# Patient Record
Sex: FEMALE | Race: WHITE | NOT HISPANIC OR LATINO | Employment: UNEMPLOYED | URBAN - METROPOLITAN AREA
[De-identification: names, ages, dates, MRNs, and addresses within clinical notes are randomized per-mention and may not be internally consistent; named-entity substitution may affect disease eponyms.]

---

## 2018-07-23 ENCOUNTER — TELEPHONE (OUTPATIENT)
Dept: TRANSPLANT | Facility: CLINIC | Age: 68
End: 2018-07-23

## 2018-07-23 NOTE — TELEPHONE ENCOUNTER
..Patient: Scarlett Ramirez       MRN: [id number]      : 1950     Age: 68 y.o.  No address on file.    Provider: Celestine    Urgency of review: urgent    Patient Transplant Status: Other To determine if patient a candidate for evaluation    Reason for presentation: Reassessment    Clinical Summary: 67 y/o female with chronic hepatitis B, currently under entecavir treatment. HBV DNA not detected in 2018.  Recent decompensation with refractory ascites, hyponatremia, encephalopathy, esophageal varices and portal vein thrombosis. Anticoagulation (Clexane) Enoxaparin started.     Imaging to be reviewed: MRI abdomen    HCC Treatment History: N/A    ABO: A +    Platelets: 96  Creatinine: 0.81  Bilirubin:1.36  AFP Last 3 each if available: 3.6    MELD: 19    Plan:     Follow-up Provider:

## 2018-07-24 ENCOUNTER — CONFERENCE (OUTPATIENT)
Dept: TRANSPLANT | Facility: CLINIC | Age: 68
End: 2018-07-24

## 2018-07-24 NOTE — TELEPHONE ENCOUNTER
..Patient: Scarlett Ramirez       MRN: [id number]      : 1950     Age: 68 y.o.  No address on file.    Provider: Celestine    Urgency of review: urgent    Patient Transplant Status: Other To determine if patient a candidate for evaluation    Reason for presentation: Reassessment    Clinical Summary: 69 y/o female with chronic hepatitis B, currently under entecavir treatment. HBV DNA not detected in 2018.  Recent decompensation with refractory ascites, hyponatremia, encephalopathy, esophageal varices and portal vein thrombosis. Anticoagulation (Clexane) Enoxaparin started.     Imaging to be reviewed: MRI abdomen    HCC Treatment History: N/A    ABO: A +    Platelets: 96  Creatinine: 0.81  Bilirubin:1.36  AFP Last 3 each if available: 3.6    MELD: 19    Plan: Thrombus from MPV to bifurcation; but potentially still doable.  Ok to come for evaluation    JS Vidales RN notified.     Follow-up Provider: Gigi Sprague MD

## 2018-08-09 DIAGNOSIS — Z76.82 AWAITING ORGAN TRANSPLANT STATUS: Primary | ICD-10-CM

## 2018-08-09 DIAGNOSIS — R18.8 ASCITES OF LIVER: Primary | ICD-10-CM

## 2018-08-09 DIAGNOSIS — Z76.82 ORGAN TRANSPLANT CANDIDATE: ICD-10-CM

## 2018-08-09 DIAGNOSIS — Z76.82 AWAITING ORGAN TRANSPLANT STATUS: ICD-10-CM

## 2018-08-10 DIAGNOSIS — Z76.82 AWAITING ORGAN TRANSPLANT STATUS: Primary | ICD-10-CM

## 2018-08-13 DIAGNOSIS — Z76.82 ORGAN TRANSPLANT CANDIDATE: Primary | ICD-10-CM

## 2018-08-14 ENCOUNTER — HOSPITAL ENCOUNTER (INPATIENT)
Facility: HOSPITAL | Age: 68
LOS: 10 days | Discharge: HOME | DRG: 871 | End: 2018-08-24
Attending: EMERGENCY MEDICINE | Admitting: EMERGENCY MEDICINE
Payer: COMMERCIAL

## 2018-08-14 ENCOUNTER — HOSPITAL ENCOUNTER (OUTPATIENT)
Dept: CARDIOLOGY | Facility: CLINIC | Age: 68
Discharge: HOME | End: 2018-08-14
Attending: INTERNAL MEDICINE
Payer: COMMERCIAL

## 2018-08-14 DIAGNOSIS — Z01.818 PRE-TRANSPLANT EVALUATION FOR LIVER TRANSPLANT: ICD-10-CM

## 2018-08-14 DIAGNOSIS — Z76.82 ORGAN TRANSPLANT CANDIDATE: ICD-10-CM

## 2018-08-14 DIAGNOSIS — E43 SEVERE MALNUTRITION: ICD-10-CM

## 2018-08-14 DIAGNOSIS — K74.60 CHRONIC HEPATITIS B WITH DELTA AGENT WITH CIRRHOSIS: ICD-10-CM

## 2018-08-14 DIAGNOSIS — Z01.818 PRE-TRANSPLANT EVALUATION FOR CHRONIC LIVER DISEASE: ICD-10-CM

## 2018-08-14 DIAGNOSIS — D70.2 OTHER DRUG-INDUCED NEUTROPENIA: ICD-10-CM

## 2018-08-14 DIAGNOSIS — K76.82 HEPATIC ENCEPHALOPATHY: Primary | ICD-10-CM

## 2018-08-14 DIAGNOSIS — I95.9 HYPOTENSION, UNSPECIFIED HYPOTENSION TYPE: ICD-10-CM

## 2018-08-14 DIAGNOSIS — I85.10 SECONDARY ESOPHAGEAL VARICES WITHOUT BLEEDING: ICD-10-CM

## 2018-08-14 DIAGNOSIS — I95.9 HYPOTENSION: ICD-10-CM

## 2018-08-14 DIAGNOSIS — E87.1 HYPONATREMIA: ICD-10-CM

## 2018-08-14 DIAGNOSIS — Z76.82 LIVER TRANSPLANT CANDIDATE: ICD-10-CM

## 2018-08-14 DIAGNOSIS — D64.9 ANEMIA REQUIRING TRANSFUSIONS: ICD-10-CM

## 2018-08-14 DIAGNOSIS — B18.0 CHRONIC HEPATITIS B WITH DELTA AGENT WITH CIRRHOSIS: ICD-10-CM

## 2018-08-14 LAB
ALBUMIN SERPL BCP-MCNC: 4 G/DL
ALP SERPL-CCNC: 658 U/L
ALT SERPL W/O P-5'-P-CCNC: 160 U/L
AMMONIA PLAS-SCNC: 67 UMOL/L
ANION GAP SERPL CALC-SCNC: 11 MMOL/L
ANION GAP SERPL CALC-SCNC: 12 MMOL/L
AST SERPL-CCNC: 156 U/L
BASOPHILS # BLD AUTO: 0.03 K/UL
BASOPHILS NFR BLD: 0.7 %
BILIRUB SERPL-MCNC: 2.1 MG/DL
BNP SERPL-MCNC: 77 PG/ML
BUN SERPL-MCNC: 68 MG/DL
BUN SERPL-MCNC: 69 MG/DL
CALCIUM SERPL-MCNC: 9.3 MG/DL
CALCIUM SERPL-MCNC: 9.5 MG/DL
CHLORIDE SERPL-SCNC: 100 MMOL/L
CHLORIDE SERPL-SCNC: 108 MMOL/L
CO2 SERPL-SCNC: 12 MMOL/L
CO2 SERPL-SCNC: 12 MMOL/L
CREAT SERPL-MCNC: 1.2 MG/DL
CREAT SERPL-MCNC: 1.4 MG/DL
DIFFERENTIAL METHOD: ABNORMAL
EOSINOPHIL # BLD AUTO: 0.1 K/UL
EOSINOPHIL NFR BLD: 2.4 %
ERYTHROCYTE [DISTWIDTH] IN BLOOD BY AUTOMATED COUNT: 19.9 %
EST. GFR  (AFRICAN AMERICAN): 44.2 ML/MIN/1.73 M^2
EST. GFR  (AFRICAN AMERICAN): 53.3 ML/MIN/1.73 M^2
EST. GFR  (NON AFRICAN AMERICAN): 38.4 ML/MIN/1.73 M^2
EST. GFR  (NON AFRICAN AMERICAN): 46.2 ML/MIN/1.73 M^2
GLUCOSE SERPL-MCNC: 157 MG/DL
GLUCOSE SERPL-MCNC: 289 MG/DL
HCT VFR BLD AUTO: 28.2 %
HGB BLD-MCNC: 9.7 G/DL
IMM GRANULOCYTES # BLD AUTO: 0.07 K/UL
IMM GRANULOCYTES NFR BLD AUTO: 1.7 %
INR PPP: 1
LACTATE SERPL-SCNC: 2.5 MMOL/L
LIPASE SERPL-CCNC: 196 U/L
LYMPHOCYTES # BLD AUTO: 0.6 K/UL
LYMPHOCYTES NFR BLD: 14.8 %
MAGNESIUM SERPL-MCNC: 2.7 MG/DL
MCH RBC QN AUTO: 30.1 PG
MCHC RBC AUTO-ENTMCNC: 34.4 G/DL
MCV RBC AUTO: 88 FL
MONOCYTES # BLD AUTO: 0.5 K/UL
MONOCYTES NFR BLD: 11 %
NEUTROPHILS # BLD AUTO: 2.9 K/UL
NEUTROPHILS NFR BLD: 69.4 %
NRBC BLD-RTO: 0 /100 WBC
PLATELET # BLD AUTO: 85 K/UL
PMV BLD AUTO: 12.4 FL
POCT GLUCOSE: 137 MG/DL (ref 70–110)
POCT GLUCOSE: 217 MG/DL (ref 70–110)
POTASSIUM SERPL-SCNC: 2.9 MMOL/L
POTASSIUM SERPL-SCNC: 3.5 MMOL/L
PROT SERPL-MCNC: 6.6 G/DL
PROTHROMBIN TIME: 10.8 SEC
RBC # BLD AUTO: 3.22 M/UL
SODIUM SERPL-SCNC: 124 MMOL/L
SODIUM SERPL-SCNC: 131 MMOL/L
T4 FREE SERPL-MCNC: 0.95 NG/DL
TROPONIN I SERPL DL<=0.01 NG/ML-MCNC: 0.02 NG/ML
TSH SERPL DL<=0.005 MIU/L-ACNC: 4.11 UIU/ML
WBC # BLD AUTO: 4.19 K/UL

## 2018-08-14 PROCEDURE — 96374 THER/PROPH/DIAG INJ IV PUSH: CPT | Mod: NTX

## 2018-08-14 PROCEDURE — 99285 EMERGENCY DEPT VISIT HI MDM: CPT | Mod: NTX,,, | Performed by: EMERGENCY MEDICINE

## 2018-08-14 PROCEDURE — 83880 ASSAY OF NATRIURETIC PEPTIDE: CPT | Mod: NTX

## 2018-08-14 PROCEDURE — 20600001 HC STEP DOWN PRIVATE ROOM: Mod: NTX

## 2018-08-14 PROCEDURE — 93005 ELECTROCARDIOGRAM TRACING: CPT | Mod: NTX

## 2018-08-14 PROCEDURE — 85025 COMPLETE CBC W/AUTO DIFF WBC: CPT | Mod: NTX

## 2018-08-14 PROCEDURE — 36415 COLL VENOUS BLD VENIPUNCTURE: CPT | Mod: NTX

## 2018-08-14 PROCEDURE — 84439 ASSAY OF FREE THYROXINE: CPT | Mod: NTX

## 2018-08-14 PROCEDURE — 96375 TX/PRO/DX INJ NEW DRUG ADDON: CPT | Mod: NTX

## 2018-08-14 PROCEDURE — 84443 ASSAY THYROID STIM HORMONE: CPT | Mod: NTX

## 2018-08-14 PROCEDURE — 85610 PROTHROMBIN TIME: CPT | Mod: NTX

## 2018-08-14 PROCEDURE — 82140 ASSAY OF AMMONIA: CPT | Mod: NTX

## 2018-08-14 PROCEDURE — 83605 ASSAY OF LACTIC ACID: CPT | Mod: NTX

## 2018-08-14 PROCEDURE — 63600175 PHARM REV CODE 636 W HCPCS: Mod: NTX | Performed by: HOSPITALIST

## 2018-08-14 PROCEDURE — 25000003 PHARM REV CODE 250: Mod: NTX | Performed by: NURSE PRACTITIONER

## 2018-08-14 PROCEDURE — 87040 BLOOD CULTURE FOR BACTERIA: CPT | Mod: NTX

## 2018-08-14 PROCEDURE — 84484 ASSAY OF TROPONIN QUANT: CPT | Mod: NTX

## 2018-08-14 PROCEDURE — 83690 ASSAY OF LIPASE: CPT | Mod: NTX

## 2018-08-14 PROCEDURE — 80053 COMPREHEN METABOLIC PANEL: CPT | Mod: NTX

## 2018-08-14 PROCEDURE — 83735 ASSAY OF MAGNESIUM: CPT | Mod: NTX

## 2018-08-14 PROCEDURE — 80048 BASIC METABOLIC PNL TOTAL CA: CPT | Mod: 91,NTX

## 2018-08-14 PROCEDURE — 99285 EMERGENCY DEPT VISIT HI MDM: CPT | Mod: 25,NTX

## 2018-08-14 PROCEDURE — 63600175 PHARM REV CODE 636 W HCPCS: Mod: NTX | Performed by: EMERGENCY MEDICINE

## 2018-08-14 PROCEDURE — 93010 ELECTROCARDIOGRAM REPORT: CPT | Mod: NTX,,, | Performed by: INTERNAL MEDICINE

## 2018-08-14 PROCEDURE — 99223 1ST HOSP IP/OBS HIGH 75: CPT | Mod: NTX,,, | Performed by: HOSPITALIST

## 2018-08-14 PROCEDURE — 25000003 PHARM REV CODE 250: Mod: NTX | Performed by: HOSPITALIST

## 2018-08-14 PROCEDURE — P9047 ALBUMIN (HUMAN), 25%, 50ML: HCPCS | Mod: JG,NTX | Performed by: HOSPITALIST

## 2018-08-14 RX ORDER — LACTULOSE 10 G/15ML
30 SOLUTION ORAL 3 TIMES DAILY
Status: DISCONTINUED | OUTPATIENT
Start: 2018-08-14 | End: 2018-08-18

## 2018-08-14 RX ORDER — MIDODRINE HYDROCHLORIDE 5 MG/1
5 TABLET ORAL 3 TIMES DAILY
Status: DISCONTINUED | OUTPATIENT
Start: 2018-08-15 | End: 2018-08-15

## 2018-08-14 RX ORDER — ENTECAVIR 0.5 MG/1
0.5 TABLET, FILM COATED ORAL DAILY
COMMUNITY
End: 2018-09-04 | Stop reason: SDUPTHER

## 2018-08-14 RX ORDER — ONDANSETRON 2 MG/ML
4 INJECTION INTRAMUSCULAR; INTRAVENOUS
Status: COMPLETED | OUTPATIENT
Start: 2018-08-14 | End: 2018-08-14

## 2018-08-14 RX ORDER — POTASSIUM CHLORIDE 20 MEQ/1
40 TABLET, EXTENDED RELEASE ORAL ONCE
Status: COMPLETED | OUTPATIENT
Start: 2018-08-14 | End: 2018-08-14

## 2018-08-14 RX ORDER — IBUPROFEN 200 MG
16 TABLET ORAL
Status: DISCONTINUED | OUTPATIENT
Start: 2018-08-14 | End: 2018-08-24 | Stop reason: HOSPADM

## 2018-08-14 RX ORDER — ENOXAPARIN SODIUM 100 MG/ML
1 INJECTION SUBCUTANEOUS EVERY 24 HOURS
Status: DISCONTINUED | OUTPATIENT
Start: 2018-08-14 | End: 2018-08-15

## 2018-08-14 RX ORDER — LACTULOSE 10 G/15ML
200 SOLUTION ORAL; RECTAL ONCE
Status: COMPLETED | OUTPATIENT
Start: 2018-08-14 | End: 2018-08-14

## 2018-08-14 RX ORDER — INSULIN GLARGINE 100 [IU]/ML
24 INJECTION, SOLUTION SUBCUTANEOUS NIGHTLY
Status: ON HOLD | COMMUNITY
End: 2018-08-24 | Stop reason: SDUPTHER

## 2018-08-14 RX ORDER — SODIUM BICARBONATE 650 MG/1
1300 TABLET ORAL 3 TIMES DAILY
Status: DISCONTINUED | OUTPATIENT
Start: 2018-08-15 | End: 2018-08-21

## 2018-08-14 RX ORDER — IBUPROFEN 200 MG
24 TABLET ORAL
Status: DISCONTINUED | OUTPATIENT
Start: 2018-08-14 | End: 2018-08-24 | Stop reason: HOSPADM

## 2018-08-14 RX ORDER — GLUCAGON 1 MG
1 KIT INJECTION
Status: DISCONTINUED | OUTPATIENT
Start: 2018-08-14 | End: 2018-08-24 | Stop reason: HOSPADM

## 2018-08-14 RX ORDER — INSULIN ASPART 100 [IU]/ML
0-5 INJECTION, SOLUTION INTRAVENOUS; SUBCUTANEOUS
Status: DISCONTINUED | OUTPATIENT
Start: 2018-08-14 | End: 2018-08-15

## 2018-08-14 RX ORDER — CEFTRIAXONE 1 G/1
1 INJECTION, POWDER, FOR SOLUTION INTRAMUSCULAR; INTRAVENOUS
Status: DISCONTINUED | OUTPATIENT
Start: 2018-08-14 | End: 2018-08-16

## 2018-08-14 RX ORDER — ALBUMIN HUMAN 250 G/1000ML
25 SOLUTION INTRAVENOUS EVERY 6 HOURS
Status: DISCONTINUED | OUTPATIENT
Start: 2018-08-14 | End: 2018-08-14

## 2018-08-14 RX ORDER — SODIUM BICARBONATE 650 MG/1
650 TABLET ORAL 2 TIMES DAILY
Status: DISCONTINUED | OUTPATIENT
Start: 2018-08-14 | End: 2018-08-14

## 2018-08-14 RX ADMIN — POTASSIUM CHLORIDE 40 MEQ: 1500 TABLET, EXTENDED RELEASE ORAL at 09:08

## 2018-08-14 RX ADMIN — INSULIN DETEMIR 10 UNITS: 100 INJECTION, SOLUTION SUBCUTANEOUS at 10:08

## 2018-08-14 RX ADMIN — ENOXAPARIN SODIUM 50 MG: 100 INJECTION SUBCUTANEOUS at 09:08

## 2018-08-14 RX ADMIN — LACTULOSE 200 G: 10 SOLUTION ORAL at 01:08

## 2018-08-14 RX ADMIN — INSULIN HUMAN 4 UNITS: 100 INJECTION, SOLUTION PARENTERAL at 01:08

## 2018-08-14 RX ADMIN — ONDANSETRON HYDROCHLORIDE 4 MG: 2 INJECTION, SOLUTION INTRAMUSCULAR; INTRAVENOUS at 11:08

## 2018-08-14 RX ADMIN — LACTULOSE 30 G: 20 SOLUTION ORAL at 09:08

## 2018-08-14 RX ADMIN — SODIUM BICARBONATE 650 MG TABLET 650 MG: at 09:08

## 2018-08-14 RX ADMIN — ALBUMIN HUMAN 25 G: 0.25 SOLUTION INTRAVENOUS at 05:08

## 2018-08-14 RX ADMIN — INSULIN ASPART 1 UNITS: 100 INJECTION, SOLUTION INTRAVENOUS; SUBCUTANEOUS at 10:08

## 2018-08-14 NOTE — ED TRIAGE NOTES
Pt transfer from [facility], being evaluated for a liver txp, pt became non verbal about 2-3 hours prior to arrival, also shows some discomfort according to family.

## 2018-08-14 NOTE — MEDICAL/APP STUDENT
Subjective:     Principal Problem- Liver Cirrhosis/Ascites    Chief Complaint- Altered Mental Status  HPI    68 year old female with a past medical history of Cirrhosis, hepatitis B and D, and portal hypertension presents to ED having been escorted by a doctor on a flight from [place] after her family noticed a lack of communication. According to the doctor who escorted her, she had elevated NH3 levels (67). Over the last few hours, the patient became more drowsy and weak. The patient was cooperative, but required an  for the verbal answers. Over the last few months, the patient has had portal vein thrombosis on lovenox, hyponatremia, ascites, hepatic encephalopathy, esophageal varices with bleeding. She receives paracentesis every 7-10 days. Today, the patient is having an echo performed on her.     The patient has a history of hepatic encephalopathy as a result of hepatitis B and D. She also had an incidence of bilirubin being elevated 3 weeks ago at 4.4, but it eventually improved. She is scheduled to have Ultrasound of the Abdomen and IR paracentesis.   PMHx    Ascites  Cirrhosis  Diabetes mellitus Type 2  Esophageal Varices  Hepatitis B and D  Hypersplenism  Mild Hepatic Encephalopathy  Portal Hypertension  Severe hyponatremia    PSHx    None on file.     FHx  - no history of note    Medications  - Deralin 20 mg * 3/d  -Levolac 30 cc * 1/d  -Entecavir 0.5 mg * 1/d  -Insulin lantus 12 IU/d    Allergies  - None    Social Hx  - smoking- no  - Alcohol- no  - recreational Drugs- no    ROS    - Constitutional- Positive for Chills; Positive for Weight loss; Negative for Fever  - Neuro- Positive for weakness. Denies headache or dizziness;  - CV- no chest pain, palpitations, or leg swelling  - Respiratory- no SOB, cough, or wheeze  - GI- nausea; diarrhea; can have abdominal pain (around liver). Can have abdominal pain around liver  - - no urination problems  - Skin- discoloration of skin; bilirubin was 4.4 3  weeks ago.   - MSK- some ankle swelling; no myalgias.   - Psych- denies anxiety or depression.       Objective:     Vitals      08/13 0700  08/14 0659 08/14 0700  08/14 1533  Most Recent    Temp (°F)    98.5  98.5 (36.9)    Pulse    72-84  80    Resp    17-21  20    BP    90//50  120/50    MAP (mmHg)    71-80  72    SpO2 (%)    100  100      CBC   Ref. Range 7/20/2018 15:50 8/14/2018 11:49 8/14/2018 11:49 8/14/2018 12:15   WBC Latest Ref Range: 3.90 - 12.70 K/uL  4.19     RBC Latest Ref Range: 4.00 - 5.40 M/uL  3.22 (L)     Hemoglobin Latest Ref Range: 12.0 - 16.0 g/dL  9.7 (L)     Hematocrit Latest Ref Range: 37.0 - 48.5 %  28.2 (L)     MCV Latest Ref Range: 82 - 98 fL  88     MCH Latest Ref Range: 27.0 - 31.0 pg  30.1     MCHC Latest Ref Range: 32.0 - 36.0 g/dL  34.4     RDW Latest Ref Range: 11.5 - 14.5 %  19.9 (H)     Platelets Latest Ref Range: 150 - 350 K/uL  85 (L)     MPV Latest Ref Range: 9.2 - 12.9 fL  12.4     Gran% Latest Ref Range: 38.0 - 73.0 %  69.4     Gran # (ANC) Latest Ref Range: 1.8 - 7.7 K/uL  2.9     Immature Granulocytes Latest Ref Range: 0.0 - 0.5 %  1.7 (H)     Immature Grans (Abs) Latest Ref Range: 0.00 - 0.04 K/uL  0.07 (H)     Lymph% Latest Ref Range: 18.0 - 48.0 %  14.8 (L)     Lymph # Latest Ref Range: 1.0 - 4.8 K/uL  0.6 (L)     Mono% Latest Ref Range: 4.0 - 15.0 %  11.0     Mono # Latest Ref Range: 0.3 - 1.0 K/uL  0.5     Eosinophil% Latest Ref Range: 0.0 - 8.0 %  2.4     Eos # Latest Ref Range: 0.0 - 0.5 K/uL  0.1     Basophil% Latest Ref Range: 0.0 - 1.9 %  0.7     Baso # Latest Ref Range: 0.00 - 0.20 K/uL  0.03     nRBC Latest Ref Range: 0 /100 WBC  0         PT    Results for MERCEDES PATHAK (MRN [id number]) as of 8/14/2018 15:34   Ref. Range 7/20/2018 15:50 8/14/2018 11:49 8/14/2018 11:49 8/14/2018 12:15   Protime Latest Ref Range: 9.0 - 12.5 sec  10.8     Coumadin Monitoring INR Latest Ref Range: 0.8 - 1.2   1.0         Chem Profile    Results for MERCEDES PATHAK (MRN [id number])  as of 8/14/2018 15:34   Ref. Range 7/20/2018 15:50 8/14/2018 11:49 8/14/2018 11:49 8/14/2018 12:15   Sodium Latest Ref Range: 136 - 145 mmol/L  124 (L)     Potassium Latest Ref Range: 3.5 - 5.1 mmol/L  3.5     Chloride Latest Ref Range: 95 - 110 mmol/L  100     CO2 Latest Ref Range: 23 - 29 mmol/L  12 (L)     Anion Gap Latest Ref Range: 8 - 16 mmol/L  12     BUN, Bld Latest Ref Range: 8 - 23 mg/dL  69 (H)     Creatinine Latest Ref Range: 0.5 - 1.4 mg/dL  1.4     eGFR if non African American Latest Ref Range: >60 mL/min/1.73 m^2  38.4 (A)     eGFR if African American Latest Ref Range: >60 mL/min/1.73 m^2  44.2 (A)     Glucose Latest Ref Range: 70 - 110 mg/dL  289 (H)     Calcium Latest Ref Range: 8.7 - 10.5 mg/dL  9.5     Magnesium Latest Ref Range: 1.6 - 2.6 mg/dL  2.7 (H)     Alkaline Phosphatase Latest Ref Range: 55 - 135 U/L  658 (H)     Total Protein Latest Ref Range: 6.0 - 8.4 g/dL  6.6     Albumin Latest Ref Range: 3.5 - 5.2 g/dL  4.0     Total Bilirubin Latest Ref Range: 0.1 - 1.0 mg/dL  2.1 (H)     AST Latest Ref Range: 10 - 40 U/L  156 (H)     ALT Latest Ref Range: 10 - 44 U/L  160 (H)     Ammonia Latest Ref Range: 10 - 50 umol/L  67 (H)     Lipase Latest Ref Range: 4 - 60 U/L  196 (H)       Cardiac Profile  Results for MERCEDES PATHAK (MRN [id number]) as of 8/14/2018 15:34   Ref. Range 7/20/2018 15:50 8/14/2018 11:49 8/14/2018 11:49 8/14/2018 12:15   Troponin I Latest Ref Range: 0.000 - 0.026 ng/mL  0.017     BNP Latest Ref Range: 0 - 99 pg/mL  77       Lactate- 2.5 (H)    Thyroid and Parathyroid     Ref. Range 7/20/2018 15:50 8/14/2018 11:49 8/14/2018 11:49 8/14/2018 12:15   TSH Latest Ref Range: 0.400 - 4.000 uIU/mL  4.113 (H)     Free T4 Latest Ref Range: 0.71 - 1.51 ng/dL  0.95         Microbio- Waiting for results of Blood Culture, Aerobic culture (to rule out SBP), anaerobic culture (rule out SBP), and Gram Stain (rule out SBP).     Imaging- See HPI. Also, X ray of the chest showed now acute  cardiopulmonary disease.     Cardiac Procedures- Echo performed; waiting on result.     Physical Exam  - Constitutional- weak, appears ill  - HENT: Pupils round and reactive to light.   - Neuro- positive asterixis. Not verbal. Pt was cooperative.   - CV- normal rate, rhythm, and intact distal pulses; no added heart sounds or friction murmurs.   - respiratory- Breath sound normal. No respiratory distress. No wheezing. No rales  - Abdominal- ascites. Abdominal pain in left side of body. Also abdominal pain in epigastric and LUQ. Unable to perform Burgess's sign due to severe pain. Hypoactive bowel sounds; no tenderness to palpation.     - Musculoskeletal- normal range of motion. No edema    -Skin- cool. dry      Assessment/Plan    Main Concern- Hepatic Encephalopathy  - Hepatic Encephalopathy    * Patient is on lactulose (traps ammonia)- traps ammonia in colon and uses gut james to acidify colon, transforming ammonia into ammonium, which cannot diffuse into blood.      * Add neomycin to destroy the bacteria that produce NH3.          Cirrhosis/Ascities/Hepatitis B and D- Patient has history of Cirrhosis as a complication of hepatitis B and D. It is likely that she was infected by the virus through blood contamination or sexual encounter. Hepatitis D can only appear as a result of the presence of hepatitis B. She is undergoing treatment with Entecavir (a nucleoside analog that reduces ability of HBV in the blood by reducing ability to multiply and infect new cells) for hepatitis B. Due to hepatitis, there is loss of ability to to synthesize albumin, so there is loss of oncotic pressure to pull water into the circulatory system. Therefore, ascites results.  The patient is undergoing paracentesis to remove fluid.     Esophageal varices/Portal hypertension  - Add octreotide (which acts as a somatostatin analog) to constrict splanchnic circulation.   - Beta blockers- Patient is on propanolol, which is a nonselective beta  blocker. This is for primary prophylaxis for varices. The nonselective beta blocker acts on B1 leading to decreased cardiac output, and B2, leading to splanchnic vasoconstriction.

## 2018-08-14 NOTE — PLAN OF CARE
Problem: Patient Care Overview  Goal: Plan of Care Review  Outcome: Ongoing (interventions implemented as appropriate)  Patient arrived on unit. Patient and family speak very little English. Oriented to Room, educated about plan of care, with assist of . Patient denies pain. Patient expresses no other needs at this time. Will continue to monitor. Safety intact.

## 2018-08-14 NOTE — PROGRESS NOTES
Pt arrived to echo lab via stretcher. Pt originally scheduled as clinic pt for dse for liver workup.  However when pt and family arrived on airplane pt was lethargic and they brought her straight to the er. Pt arrived to echo for clinic dse. Call made to dr foley. Informed h im of above and not hospital order was placed. md stated pt does not need a stress test at this time as pt is admitted to hospital. Pt family informed of above via  who is with pt and family. Family verbalizes understanding.

## 2018-08-14 NOTE — ED PROVIDER NOTES
Encounter Date: 8/14/2018    SCRIBE #1 NOTE: I, Irasema Noel, am scribing for, and in the presence of,  Dr. Young. I have scribed the entire note.       History     Chief Complaint   Patient presents with    Weakness     Time patient was seen by the provider: 11:07 AM      The patient is a 68 y.o. 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 Over the last few months, she developed decomposition with portal vein thrombosis on Lovenox, hyponatremia, ascites, hepatic encephalopathy, esophageal varices with bleeding. She receives recurrent paracentesis every 7-10 days. She arrived immediately from airplane from [facility]. Over the last few hours, according to her family, she became drowsier and weaker. Patient arrived via EMS who notes a blood sugar of 342. Patient is nonverbal at this time, however, she does cooperate with the exam. She endorses nausea.      The history is provided by a relative and medical records. The history is limited by a language barrier. A  was used.     Review of patient's allergies indicates:  Allergies not on file  Past Medical History:   Diagnosis Date    Ascites     Cirrhosis     Esophageal varices     Hepatic encephalopathy     Hepatitis B     Hepatitis D virus infection     Hypersplenism     Portal hypertension      No past surgical history on file.  No family history on file.  Social History     Tobacco Use    Smoking status: Not on file   Substance Use Topics    Alcohol use: Not on file    Drug use: Not on file     Review of Systems   Unable to perform ROS: Patient nonverbal   Gastrointestinal: Positive for nausea.   Neurological: Positive for weakness.       Physical Exam     Initial Vitals [08/14/18 1059]   BP Pulse Resp Temp SpO2   (!) 90/43 84 17 98.5 °F (36.9 °C) 100 %      MAP       --         Physical Exam    Nursing note and vitals  reviewed.  Constitutional:   Appears ill. Weak. Deconditioned.    HENT:   Head: Normocephalic and atraumatic.   Eyes: EOM are normal. Pupils are equal, round, and reactive to light.   Neck: Normal range of motion. Neck supple.   Cardiovascular: Normal rate, regular rhythm, normal heart sounds and intact distal pulses.   Pulmonary/Chest: Breath sounds normal. No respiratory distress. She has no wheezes. She has no rhonchi. She has no rales.   Abdominal: She exhibits distension and fluid wave. There is no rebound.   Hypoactive bowel sounds. No tenderness to palpation   Musculoskeletal: Normal range of motion. She exhibits no edema.   Neurological:   Positive asterixis. Patient is awake and cooperative with exam. Nonverbal. Not oriented to year.    Skin:   Skin is  cool.          ED Course   Procedures  Labs Reviewed   CBC W/ AUTO DIFFERENTIAL - Abnormal; Notable for the following components:       Result Value    RBC 3.22 (*)     Hemoglobin 9.7 (*)     Hematocrit 28.2 (*)     RDW 19.9 (*)     Platelets 85 (*)     Immature Granulocytes 1.7 (*)     Immature Grans (Abs) 0.07 (*)     Lymph # 0.6 (*)     Lymph% 14.8 (*)     All other components within normal limits   COMPREHENSIVE METABOLIC PANEL - Abnormal; Notable for the following components:    Sodium 124 (*)     CO2 12 (*)     Glucose 289 (*)     BUN, Bld 69 (*)     Total Bilirubin 2.1 (*)     Alkaline Phosphatase 658 (*)      (*)      (*)     eGFR if  44.2 (*)     eGFR if non  38.4 (*)     All other components within normal limits   LACTIC ACID, PLASMA - Abnormal; Notable for the following components:    Lactate (Lactic Acid) 2.5 (*)     All other components within normal limits   TSH - Abnormal; Notable for the following components:    TSH 4.113 (*)     All other components within normal limits   MAGNESIUM - Abnormal; Notable for the following components:    Magnesium 2.7 (*)     All other components within normal limits    AMMONIA - Abnormal; Notable for the following components:    Ammonia 67 (*)     All other components within normal limits   LIPASE - Abnormal; Notable for the following components:    Lipase 196 (*)     All other components within normal limits   CULTURE, BLOOD   CULTURE, BLOOD   PROTIME-INR   TROPONIN I   B-TYPE NATRIURETIC PEPTIDE   T4, FREE   URINALYSIS, REFLEX TO URINE CULTURE     EKG Readings: (Independently Interpreted)   Rhythm: Normal Sinus Rhythm. Heart Rate: 76. ST Segments: Normal ST Segments. T Waves: Normal. Axis: Normal.       Imaging Results          X-Ray Chest AP Portable (Final result)  Result time 08/14/18 12:30:25    Final result by Basim Patrick MD (08/14/18 12:30:25)                 Impression:      No acute cardiopulmonary disease.      Electronically signed by: Basim Patrick MD  Date:    08/14/2018  Time:    12:30             Narrative:    EXAMINATION:  XR CHEST AP PORTABLE    CLINICAL HISTORY:  hypotension;    TECHNIQUE:  Single frontal view of the chest was performed.    COMPARISON:  None    FINDINGS:  Lungs are clear.  No evidence for congestion, effusion, or infiltrate.  Osseous thorax is intact.  Cardiovascular silhouette unremarkable.                              X-Rays:   Independently Interpreted Readings:   Chest X-Ray: No acute process     Medical Decision Making:   History:   Old Medical Records: I decided to obtain old medical records.  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 My differential includes, but is not limited to: sepsis, hypovolemia, hepatic encephalopathy, EVA, hyponatremia, ACS, infectious issues, shock.     Will obtain serum and urine labs, CXR. Liver transplant consulted for admission. Will hold IV fluid resuscitation at this time given the patient's overall hypervolemia, and improved BP on my eval.     Reassessment:   Repeat blood pressure stable. Hemoglobin is  9.7. There is no leukocytosis. Sodium low at 124. She has a known hx of hyponatremia secondary to hypervolemia. She is acidotic with a bicarbonate of 12. Hyperglycemic at 289, will administer insulin. Creatinine at 1.4. Total bilirubin is 2.1 and LFT's elevated. Troponin within normal limits. Lipase elevated at 196. Ammonia elevated at 67, but should improve with lactulose. Overall presentation concerning for hepatic encephalopathy. She was evaluated by liver transplant who recommends a lactulose enema and admission to IM-L. Lactic acid at 2.5    Reassessment:  S/p lactulose enema, she is somewhat more alert and able to tolerate sips of water with assistance. Updated MELD score of 23. Will admit to IM-L.    Independently Interpreted Test(s):   I have ordered and independently interpreted EKG Reading(s) - see prior notes  Clinical Tests:   Lab Tests: Ordered and Reviewed  Radiological Study: Ordered and Reviewed  Medical Tests: Ordered and Reviewed  Other:   I have discussed this case with another health care provider.            Scribe Attestation:   Scribe #1: I performed the above scribed service and the documentation accurately describes the services I performed. I attest to the accuracy of the note.    Attending Attestation:           Physician Attestation for Scribe:      Comments: I, Dr. Marin Young, personally performed the services described in this documentation. All medical record entries made by the scribe were at my direction and in my presence.  I have reviewed the chart and agree that the record reflects my personal performance and is accurate and complete. Marin Young MD.  2:34 PM 08/14/2018                 Clinical Impression:   The primary encounter diagnosis was Hepatic encephalopathy. Diagnoses of Hypotension and Hyponatremia were also pertinent to this visit.      Disposition:   Disposition: Admitted                        Marin Young MD  08/14/18 9054

## 2018-08-14 NOTE — ED NOTES
When pt was last seen she was alert but unable to assess whether she was oriented due to her being non verbal at the time.  Her respirations were even and unlabored, skin dry and warm, and showed no signs or symptoms of acute distress.  Patient is being transferred from [place] to her hospital room.

## 2018-08-15 PROBLEM — B18.0: Status: ACTIVE | Noted: 2018-08-15

## 2018-08-15 PROBLEM — I85.00 ESOPHAGEAL VARICES WITHOUT BLEEDING: Status: ACTIVE | Noted: 2018-08-15

## 2018-08-15 PROBLEM — E11.9 TYPE 2 DIABETES MELLITUS, WITH LONG-TERM CURRENT USE OF INSULIN: Status: ACTIVE | Noted: 2018-08-15

## 2018-08-15 PROBLEM — K74.60: Status: ACTIVE | Noted: 2018-08-15

## 2018-08-15 PROBLEM — Z79.4 TYPE 2 DIABETES MELLITUS, WITH LONG-TERM CURRENT USE OF INSULIN: Status: ACTIVE | Noted: 2018-08-15

## 2018-08-15 LAB
AFP SERPL-MCNC: 2.9 NG/ML
ALBUMIN FLD-MCNC: 0.6 G/DL
ALBUMIN SERPL BCP-MCNC: 3.7 G/DL
ALLENS TEST: ABNORMAL
ALP SERPL-CCNC: 544 U/L
ALT SERPL W/O P-5'-P-CCNC: 131 U/L
ANION GAP SERPL CALC-SCNC: 11 MMOL/L
ANION GAP SERPL CALC-SCNC: 11 MMOL/L
APPEARANCE FLD: NORMAL
AST SERPL-CCNC: 139 U/L
BASOPHILS # BLD AUTO: 0.02 K/UL
BASOPHILS NFR BLD: 0.6 %
BILIRUB SERPL-MCNC: 1.9 MG/DL
BODY FLD TYPE: NORMAL
BODY FLUID SOURCE, LDH: NORMAL
BUN SERPL-MCNC: 66 MG/DL
BUN SERPL-MCNC: 67 MG/DL
CALCIUM SERPL-MCNC: 9.1 MG/DL
CALCIUM SERPL-MCNC: 9.3 MG/DL
CHLORIDE SERPL-SCNC: 105 MMOL/L
CHLORIDE SERPL-SCNC: 107 MMOL/L
CO2 SERPL-SCNC: 12 MMOL/L
CO2 SERPL-SCNC: 12 MMOL/L
COLOR FLD: NORMAL
CREAT SERPL-MCNC: 1.2 MG/DL
CREAT SERPL-MCNC: 1.4 MG/DL
DIFFERENTIAL METHOD: ABNORMAL
EOSINOPHIL # BLD AUTO: 0.1 K/UL
EOSINOPHIL NFR BLD: 1.8 %
ERYTHROCYTE [DISTWIDTH] IN BLOOD BY AUTOMATED COUNT: 20.1 %
EST. GFR  (AFRICAN AMERICAN): 44.2 ML/MIN/1.73 M^2
EST. GFR  (AFRICAN AMERICAN): 53.3 ML/MIN/1.73 M^2
EST. GFR  (NON AFRICAN AMERICAN): 38.4 ML/MIN/1.73 M^2
EST. GFR  (NON AFRICAN AMERICAN): 46.2 ML/MIN/1.73 M^2
GLUCOSE SERPL-MCNC: 258 MG/DL
GLUCOSE SERPL-MCNC: 281 MG/DL
GRAM STN SPEC: NORMAL
GRAM STN SPEC: NORMAL
HBSAG CONFIRMATION: POSITIVE
HBV CORE AB SERPL QL IA: POSITIVE
HBV SURFACE AB SER-ACNC: NEGATIVE M[IU]/ML
HBV SURFACE AG SERPL QL IA: POSITIVE
HCO3 UR-SCNC: 11.8 MMOL/L (ref 24–28)
HCT VFR BLD AUTO: 24.8 %
HCV AB SERPL QL IA: NEGATIVE
HEPATITIS A ANTIBODY, IGG: POSITIVE
HGB BLD-MCNC: 8.7 G/DL
HIV 1+2 AB+HIV1 P24 AG SERPL QL IA: NEGATIVE
IMM GRANULOCYTES # BLD AUTO: 0.03 K/UL
IMM GRANULOCYTES NFR BLD AUTO: 0.9 %
INR PPP: 1.1
LACTATE SERPL-SCNC: 2.5 MMOL/L
LDH FLD L TO P-CCNC: 28 U/L
LYMPHOCYTES # BLD AUTO: 0.5 K/UL
LYMPHOCYTES NFR BLD: 13.6 %
LYMPHOCYTES NFR FLD MANUAL: 63 %
MAGNESIUM SERPL-MCNC: 2.5 MG/DL
MCH RBC QN AUTO: 30.7 PG
MCHC RBC AUTO-ENTMCNC: 35.1 G/DL
MCV RBC AUTO: 88 FL
MONOCYTES # BLD AUTO: 0.4 K/UL
MONOCYTES NFR BLD: 10.4 %
MONOS+MACROS NFR FLD MANUAL: 16 %
NEUTROPHILS # BLD AUTO: 2.5 K/UL
NEUTROPHILS NFR BLD: 72.7 %
NEUTROPHILS NFR FLD MANUAL: 21 %
NRBC BLD-RTO: 0 /100 WBC
PCO2 BLDA: 19.4 MMHG (ref 35–45)
PH SMN: 7.39 [PH] (ref 7.35–7.45)
PHOSPHATE SERPL-MCNC: 4 MG/DL
PLATELET # BLD AUTO: 65 K/UL
PMV BLD AUTO: 11.7 FL
PO2 BLDA: 64 MMHG (ref 40–60)
POC BE: -13 MMOL/L
POC SATURATED O2: 93 % (ref 95–100)
POC TCO2: 12 MMOL/L (ref 24–29)
POCT GLUCOSE: 272 MG/DL (ref 70–110)
POCT GLUCOSE: 280 MG/DL (ref 70–110)
POCT GLUCOSE: 300 MG/DL (ref 70–110)
POCT GLUCOSE: 335 MG/DL (ref 70–110)
POTASSIUM SERPL-SCNC: 2.8 MMOL/L
POTASSIUM SERPL-SCNC: 3.3 MMOL/L
PROT FLD-MCNC: 1.3 G/DL
PROT SERPL-MCNC: 5.7 G/DL
PROTHROMBIN TIME: 11.1 SEC
RBC # BLD AUTO: 2.83 M/UL
SAMPLE: ABNORMAL
SITE: ABNORMAL
SODIUM SERPL-SCNC: 128 MMOL/L
SODIUM SERPL-SCNC: 130 MMOL/L
SPECIMEN SOURCE: NORMAL
SPECIMEN SOURCE: NORMAL
WBC # BLD AUTO: 3.38 K/UL
WBC # FLD: 80 /CU MM

## 2018-08-15 PROCEDURE — 87075 CULTR BACTERIA EXCEPT BLOOD: CPT | Mod: NTX

## 2018-08-15 PROCEDURE — 88175 CYTOPATH C/V AUTO FLUID REDO: CPT | Mod: NTX

## 2018-08-15 PROCEDURE — 99900035 HC TECH TIME PER 15 MIN (STAT): Mod: NTX

## 2018-08-15 PROCEDURE — 87340 HEPATITIS B SURFACE AG IA: CPT | Mod: NTX

## 2018-08-15 PROCEDURE — 82042 OTHER SOURCE ALBUMIN QUAN EA: CPT | Mod: NTX

## 2018-08-15 PROCEDURE — 89051 BODY FLUID CELL COUNT: CPT | Mod: NTX

## 2018-08-15 PROCEDURE — 86703 HIV-1/HIV-2 1 RESULT ANTBDY: CPT | Mod: NTX

## 2018-08-15 PROCEDURE — 85025 COMPLETE CBC W/AUTO DIFF WBC: CPT | Mod: NTX

## 2018-08-15 PROCEDURE — 36415 COLL VENOUS BLD VENIPUNCTURE: CPT | Mod: NTX

## 2018-08-15 PROCEDURE — 20600001 HC STEP DOWN PRIVATE ROOM: Mod: NTX

## 2018-08-15 PROCEDURE — 86704 HEP B CORE ANTIBODY TOTAL: CPT | Mod: NTX

## 2018-08-15 PROCEDURE — 25000003 PHARM REV CODE 250: Mod: NTX | Performed by: HOSPITALIST

## 2018-08-15 PROCEDURE — 83735 ASSAY OF MAGNESIUM: CPT | Mod: NTX

## 2018-08-15 PROCEDURE — 82803 BLOOD GASES ANY COMBINATION: CPT | Mod: NTX

## 2018-08-15 PROCEDURE — 0W9G3ZZ DRAINAGE OF PERITONEAL CAVITY, PERCUTANEOUS APPROACH: ICD-10-PCS | Performed by: RADIOLOGY

## 2018-08-15 PROCEDURE — 86803 HEPATITIS C AB TEST: CPT | Mod: NTX

## 2018-08-15 PROCEDURE — 82105 ALPHA-FETOPROTEIN SERUM: CPT | Mod: NTX

## 2018-08-15 PROCEDURE — 80053 COMPREHEN METABOLIC PANEL: CPT | Mod: NTX

## 2018-08-15 PROCEDURE — 86480 TB TEST CELL IMMUN MEASURE: CPT | Mod: NTX

## 2018-08-15 PROCEDURE — 86790 VIRUS ANTIBODY NOS: CPT | Mod: NTX

## 2018-08-15 PROCEDURE — 99499 UNLISTED E&M SERVICE: CPT | Mod: NTX,,, | Performed by: PHYSICIAN ASSISTANT

## 2018-08-15 PROCEDURE — P9047 ALBUMIN (HUMAN), 25%, 50ML: HCPCS | Mod: JG,NTX | Performed by: HOSPITALIST

## 2018-08-15 PROCEDURE — 86382 NEUTRALIZATION TEST VIRAL: CPT | Mod: NTX

## 2018-08-15 PROCEDURE — 83605 ASSAY OF LACTIC ACID: CPT | Mod: NTX

## 2018-08-15 PROCEDURE — 99222 1ST HOSP IP/OBS MODERATE 55: CPT | Mod: NTX,,, | Performed by: INTERNAL MEDICINE

## 2018-08-15 PROCEDURE — 84100 ASSAY OF PHOSPHORUS: CPT | Mod: NTX

## 2018-08-15 PROCEDURE — 86706 HEP B SURFACE ANTIBODY: CPT | Mod: NTX

## 2018-08-15 PROCEDURE — 84157 ASSAY OF PROTEIN OTHER: CPT | Mod: NTX

## 2018-08-15 PROCEDURE — 83615 LACTATE (LD) (LDH) ENZYME: CPT | Mod: NTX

## 2018-08-15 PROCEDURE — 63600175 PHARM REV CODE 636 W HCPCS: Mod: JG,NTX | Performed by: HOSPITALIST

## 2018-08-15 PROCEDURE — 99233 SBSQ HOSP IP/OBS HIGH 50: CPT | Mod: NTX,,, | Performed by: HOSPITALIST

## 2018-08-15 PROCEDURE — 87070 CULTURE OTHR SPECIMN AEROBIC: CPT | Mod: NTX

## 2018-08-15 PROCEDURE — 85610 PROTHROMBIN TIME: CPT | Mod: NTX

## 2018-08-15 PROCEDURE — 87205 SMEAR GRAM STAIN: CPT | Mod: NTX

## 2018-08-15 PROCEDURE — 80321 ALCOHOLS BIOMARKERS 1OR 2: CPT | Mod: NTX

## 2018-08-15 RX ORDER — ALBUMIN HUMAN 250 G/1000ML
25 SOLUTION INTRAVENOUS ONCE
Status: COMPLETED | OUTPATIENT
Start: 2018-08-15 | End: 2018-08-15

## 2018-08-15 RX ORDER — INSULIN ASPART 100 [IU]/ML
15 INJECTION, SOLUTION INTRAVENOUS; SUBCUTANEOUS
Status: DISCONTINUED | OUTPATIENT
Start: 2018-08-16 | End: 2018-08-15

## 2018-08-15 RX ORDER — ENOXAPARIN SODIUM 100 MG/ML
50 INJECTION SUBCUTANEOUS
Status: DISCONTINUED | OUTPATIENT
Start: 2018-08-15 | End: 2018-08-16

## 2018-08-15 RX ORDER — INSULIN ASPART 100 [IU]/ML
0-5 INJECTION, SOLUTION INTRAVENOUS; SUBCUTANEOUS
Status: DISCONTINUED | OUTPATIENT
Start: 2018-08-15 | End: 2018-08-24 | Stop reason: HOSPADM

## 2018-08-15 RX ORDER — POTASSIUM CHLORIDE 20 MEQ/1
40 TABLET, EXTENDED RELEASE ORAL ONCE
Status: COMPLETED | OUTPATIENT
Start: 2018-08-15 | End: 2018-08-15

## 2018-08-15 RX ORDER — ENTECAVIR 0.5 MG/1
0.5 TABLET, FILM COATED ORAL DAILY
Status: DISCONTINUED | OUTPATIENT
Start: 2018-08-16 | End: 2018-08-16

## 2018-08-15 RX ORDER — ALBUMIN HUMAN 250 G/1000ML
25 SOLUTION INTRAVENOUS EVERY 8 HOURS
Status: COMPLETED | OUTPATIENT
Start: 2018-08-15 | End: 2018-08-16

## 2018-08-15 RX ORDER — INSULIN ASPART 100 [IU]/ML
12 INJECTION, SOLUTION INTRAVENOUS; SUBCUTANEOUS
Status: DISCONTINUED | OUTPATIENT
Start: 2018-08-16 | End: 2018-08-20

## 2018-08-15 RX ADMIN — ENOXAPARIN SODIUM 50 MG: 100 INJECTION SUBCUTANEOUS at 11:08

## 2018-08-15 RX ADMIN — INSULIN ASPART 3 UNITS: 100 INJECTION, SOLUTION INTRAVENOUS; SUBCUTANEOUS at 07:08

## 2018-08-15 RX ADMIN — ALBUMIN (HUMAN) 25 G: 25 SOLUTION INTRAVENOUS at 02:08

## 2018-08-15 RX ADMIN — LACTULOSE 30 G: 20 SOLUTION ORAL at 04:08

## 2018-08-15 RX ADMIN — SODIUM BICARBONATE 650 MG TABLET 1300 MG: at 09:08

## 2018-08-15 RX ADMIN — MIDODRINE HYDROCHLORIDE 5 MG: 5 TABLET ORAL at 04:08

## 2018-08-15 RX ADMIN — SODIUM BICARBONATE 650 MG TABLET 1300 MG: at 04:08

## 2018-08-15 RX ADMIN — LACTULOSE 30 G: 20 SOLUTION ORAL at 09:08

## 2018-08-15 RX ADMIN — ENOXAPARIN SODIUM 50 MG: 100 INJECTION SUBCUTANEOUS at 12:08

## 2018-08-15 RX ADMIN — INSULIN ASPART 3 UNITS: 100 INJECTION, SOLUTION INTRAVENOUS; SUBCUTANEOUS at 11:08

## 2018-08-15 RX ADMIN — MIDODRINE HYDROCHLORIDE 5 MG: 5 TABLET ORAL at 09:08

## 2018-08-15 RX ADMIN — SODIUM BICARBONATE 650 MG TABLET 1300 MG: at 11:08

## 2018-08-15 RX ADMIN — CEFTRIAXONE SODIUM 1 G: 1 INJECTION, POWDER, FOR SOLUTION INTRAMUSCULAR; INTRAVENOUS at 12:08

## 2018-08-15 RX ADMIN — ALBUMIN HUMAN 25 G: 0.25 SOLUTION INTRAVENOUS at 11:08

## 2018-08-15 RX ADMIN — POTASSIUM CHLORIDE 40 MEQ: 1500 TABLET, EXTENDED RELEASE ORAL at 09:08

## 2018-08-15 RX ADMIN — INSULIN ASPART 3 UNITS: 100 INJECTION, SOLUTION INTRAVENOUS; SUBCUTANEOUS at 04:08

## 2018-08-15 RX ADMIN — CEFTRIAXONE SODIUM 1 G: 1 INJECTION, POWDER, FOR SOLUTION INTRAMUSCULAR; INTRAVENOUS at 11:08

## 2018-08-15 NOTE — PROGRESS NOTES
PHARM.D. PRE-TRANSPLANT NOTE:    This patient's medication therapy was evaluated as part of her pre-transplant evaluation.    The following pharmacologic concerns were noted: currently hospitalized on rocephin; on Entecavir    This patient's medication profile was reviewed for contraindications for DAA Hepatitis C therapy:    [X]  No current inducers of CYP 3A4 or PGP  [X]  No amiodarone on this patient's EMR profile in the last 24 months  [X]  No past or current atrial fibrillation on this patient's EMR profile       Current Facility-Administered Medications   Medication Dose Route Frequency Provider Last Rate Last Dose    cefTRIAXone injection 1 g  1 g Intravenous Q24H Cristobal Rodrigues MD   1 g at 08/15/18 0002    dextrose 50% injection 12.5 g  12.5 g Intravenous PRN Cristobal Rodrigues MD        dextrose 50% injection 25 g  25 g Intravenous PRN Cristobal Rodrigues MD        enoxaparin injection 50 mg  1 mg/kg Subcutaneous Daily Cristobal Rodrigues MD   50 mg at 08/14/18 2154    glucagon (human recombinant) injection 1 mg  1 mg Intramuscular PRN Cristobal Rodrigues MD        glucose chewable tablet 16 g  16 g Oral PRN Cristobal Rodrigues MD        glucose chewable tablet 24 g  24 g Oral PRN Cristobal Rodrigues MD        insulin aspart U-100 pen 0-5 Units  0-5 Units Subcutaneous TIDWM Cristobal Rodrigues MD   3 Units at 08/15/18 0739    insulin detemir U-100 pen 20 Units  20 Units Subcutaneous QHS Cristobal Rodrigues MD        lactulose 20 gram/30 mL solution Soln 30 g  30 g Oral TID Cristobal Rodrigues MD   30 g at 08/15/18 0950    midodrine tablet 5 mg  5 mg Oral TID Cristobal Rodrigues MD   5 mg at 08/15/18 0950    sodium bicarbonate tablet 1,300 mg  1,300 mg Oral TID Cristobal Rodrigues MD   1,300 mg at 08/15/18 0950         Currently patient is responsible for preparing / administering this patient's medications on a daily basis.  I am available for consultation and can be contacted, as needed by the other members of the Liver  Transplant team.

## 2018-08-15 NOTE — H&P
Hospital Medicine  History and Physical Exam    Team: Networked reference to record PCT  Cristobal Rodrigues MD  Admit Date: 8/14/2018  AJIT   Principal Problem: Hepatic Encephalopathy  Patient information was obtained from patient and relative(s).   Primary care Physician: Primary Doctor No  Code status: Full Code    HPI:     Patient is a 69 yo F with a PMH Of Hep B and D Cirrhosis (acquired from Blood transfusion due to miscarriage in [place] in 1982) on Entecavir (Hep DNA not detected on 05/2018), Esophageal Varices, with esophageal bleed 7 years ago on propanolol, Portal Vein thrombosis on Lovenox, Insulin dependent diabetes presents as transfer from [facility] for inpatient evaluation of liver transplant. As per son who served as , patient was admitted to [facility] yesterday due to worsening hyponatremia and weakness, she was subsequently discharged and was brought in by family to [facility] where she has been evaluated remotely for a transplant. Son reports patient's mentation started to decline on the second leg of the trip from [place] To [place]. [place]. Patient has required more frequent paracentesis every 7 days. By the time of my assessment, patient mental status had improved considerably.     ER Course: rectal lactulose x 1, Insulin IV 4 units, Zofran IV 4 mg        Past Medical History: Patient has a past medical history of Ascites, Cirrhosis, Esophageal varices, Hepatic encephalopathy, Hepatitis B, Hepatitis D virus infection, Hypersplenism, and Portal hypertension.    Past Surgical History: Patient has no past surgical history on file.    Social History: Patient     Family History: family history is not on file.    Medications:   Prior to Admission medications    Medication Sig Start Date End Date Taking? Authorizing Provider   entecavir (BARACLUDE) 0.5 MG Tab Take 0.5 mg by mouth once daily.   Yes Historical Provider, MD   insulin  glargine (LANTUS) 100 unit/mL injection Inject 12 Units into the skin every evening.   Yes Historical Provider, MD       Allergies: Patient has No Known Allergies.    ROS    Unable to perform ROS: Patient nonverbal   Gastrointestinal: Positive for nausea.   Neurological: Positive for weakness.         PEx  Temp:  [96.8 °F (36 °C)-98.5 °F (36.9 °C)]   Pulse:  [72-84]   Resp:  [16-21]   BP: ()/(43-59)   SpO2:  [100 %]   Body mass index is 18.34 kg/m².     Constitutional:   Appears ill. Weak. Deconditioned.    HENT:   Head: Normocephalic and atraumatic.   Eyes: EOM are normal. Pupils are equal, round, and reactive to light.   Neck: Normal range of motion. Neck supple.   Cardiovascular: Normal rate, regular rhythm, normal heart sounds and intact distal pulses.   Pulmonary/Chest: Breath sounds normal. No respiratory distress. She has no wheezes. She has no rhonchi. She has no rales.   Abdominal: She exhibits distension and fluid wave. There is no rebound.   Hypoactive bowel sounds. No tenderness to palpation   Musculoskeletal: Normal range of motion. She exhibits no edema.   Neurological:   Positive asterixis. Patient is awake and cooperative with exam. Nonverbal. Not oriented to year.    Skin:   Skin is  cool.            Intake/Output Summary (Last 24 hours) at 8/14/2018 2108  Last data filed at 8/14/2018 1900  Gross per 24 hour   Intake 220 ml   Output --   Net 220 ml     Recent Labs   Lab  08/14/18   1149   WBC  4.19   HGB  9.7*   HCT  28.2*   PLT  85*     Recent Labs   Lab  08/14/18   1149  08/14/18   1937   NA  124*  131*   K  3.5  2.9*   CL  100  108   CO2  12*  12*   BUN  69*  68*   CREATININE  1.4  1.2   GLU  289*  157*   CALCIUM  9.5  9.3   MG  2.7*   --    LIPASE  196*   --      Recent Labs   Lab  08/14/18   1149   ALKPHOS  658*   ALT  160*   AST  156*   ALBUMIN  4.0   PROT  6.6   BILITOT  2.1*   INR  1.0      Recent Labs   Lab  08/14/18   1602   POCTGLUCOSE  137*     Recent Labs      08/14/18   1149    TROPONINI  0.017       Recent Labs      08/14/18   1149   LACTATE  2.5*        Active Hospital Problems    Diagnosis  POA    Hepatic encephalopathy [K72.90]  Yes      Resolved Hospital Problems   No resolved problems to display.       Overview    69 yo F with with above mentioned medical history presents with confusion and decompensated liver cirrhosis, admitted for expedited liver transplant evaluation    Assessment and Plan for Problems addressed today:    Hepatic Encephalopathy  - ammonia levels high at 67  - Started on PO Lactulose, one time rectal lactulose given in the ER  - AAO X 3    Decompensated Liver cirrhosis due to Hep B And D  MELD-Na score: 11 at 8/14/2018  7:37 PM  MELD score: 11 at 8/14/2018  7:37 PM  Calculated from:  Serum Creatinine: 1.2 mg/dL at 8/14/2018  7:37 PM  Serum Sodium: 131 mmol/L at 8/14/2018  7:37 PM  Total Bilirubin: 2.1 mg/dL at 8/14/2018 11:49 AM  INR(ratio): 1 at 8/14/2018 11:49 AM  Age: 68 years  Hold beta blockers due to hypotension  - start IV Ceftriaxone until SBP Ruled out  - UA/Urine culture  - blood cultures   - Fluid restriction  - US liver doppler  - Hepatology consult  - AFP in am  - Lactic acid 2.5, repeat in am  - PETH in am  - IR paracentesis, diagnostic and therapeutic    Metabolic acidosis  - VBG  - start sodium bicarbonate tablets     Diabetes type 2  - POCT q ACHS  - low dose sliding scale   - HgbA1c in am  - cont insulin at night, but at a decreased dose given IV insulin given at night    Hyponatremia  - most likely hypervolemic  - fluid restriction  - Urine sodium  - 130 2 days ago in compa, now 124  - IV Albumin x 1, repeat BMP    Portal Vein thrombosis  - US liver doppler  - on Lovenox SQ daily       DVT PPx:   Anticoagulants   Medication Route Frequency    enoxaparin injection 50 mg Subcutaneous Daily         Provider    Cristobal Melendez MD  Staff Hospitalist  Department of Hospital Medicine  [facility]    [phone]

## 2018-08-15 NOTE — HPI
Scarlett Reddy is a 67 y/o female with past medical history of Hep B and D cirrhosis, on entecavir with undetectable Hep B DNA, c/b EV with bleeding, HE, PV thrombosis, and refractory ascites, T2DM, who presented as a transfer from [facility] for evaluation of liver transplant.     Per records, the patient has been declining recently with hyponatremia due to diuretics (Na of 121), that improvement to 129 with holding diuresis, but now requiring paracentesis every 7-10 days.   She presented to a hospital in [facility] for progressive weakness, found to have worsening hyponatremia.  Per son report, the patient declined rapidly at the end of the trip to [place], and had to be transported by EMS.    Patient today states that she is feeling well .She states that she does not remember the details of her trip. She states that she had a paracentesis just before flying to [place]. She denies f/c, denies N/V, deines abdominal pain. Endorses abdominal swelling. Denies melena or hematemesis    The patient notes she was active and independent prior to these event. She denies alcohol use.     Labs on presentation are notable for PLT of 85, Na 124, Cr 1.4, ALKP 658, Tbili 2.1, AST//160

## 2018-08-15 NOTE — PROCEDURES
Radiology Post-Procedure Note    Pre Op Diagnosis: Ascites  Post Op Diagnosis: Same    Procedure: Paracentesis    Procedure performed by: Jae Ventura MD supervised by Fernando Lund MD    Written Informed Consent Obtained: Yes  Specimen Removed: YES , 2 tubes of fluid sent for analysis  Estimated Blood Loss: Minimal    Findings:   Successful paracentesis.  Albumin administered PRN per protocol.    Patient tolerated procedure well.    Jae Ventura MD  Resident  Department of Radiology  Pager: [phone]

## 2018-08-15 NOTE — CONSULTS
Consult Note  Gynecology    Consult Requested By: Hospital Medicine  Reason for Consult: Liver transplant evaluation    SUBJECTIVE:     History of Present Illness:  Patient is a 68 y.o. with PMH of Hep B and D cirrhosis acquired from blood transfusion currently being evaluated for liver transplant. GYN services requested for pap smear and well woman exam. Patient interviewed with assistance of  who was present throughout exam.  Katerin Duque.  #[id number].    Patient is postmenopausal since the age of 44, sees regular GYN in home country of [place]. for annual exams. She has no gynecologic complaints at this time. She was seen in the last three months for complete well woman workup in [place] including pap smear, mammogram, DEXA and colonoscopy. Denies vaginal bleeding, abnormal discharge, difficulty with urination, incontinence, breast masses, skin changes, or nipple discharge.     OB History:     x4   x0  Miscarriage x1    GYN History:  Menopause at age 44, LMP unknown exact date , cycles regular prior to that, monthly, lasting 5 days with normal flow  Last pap in past 3 months unknown exact date; normal  Denies h/o abnormal paps or excisional procedures  Sexually active, no contraception   Denies h/o STDs  Last MMG within last three months; unknown exact date; normal  Denies h/o abnormal MMG    Family History:  No family history of breast, uterine, or ovarian cancer.    PMHx:   Past Medical History:   Diagnosis Date    Ascites     Cirrhosis     Esophageal varices     Hepatic encephalopathy     Hepatitis B     Hepatitis D virus infection     Hypersplenism     Portal hypertension        PSHx: History reviewed. No pertinent surgical history.    All: Review of patient's allergies indicates:  No Known Allergies    Meds:   Medications Prior to Admission   Medication Sig Dispense Refill Last Dose    entecavir (BARACLUDE) 0.5 MG Tab Take 0.5 mg by mouth once daily.        insulin glargine (LANTUS) 100 unit/mL injection Inject 12 Units into the skin every evening.          SH:   Social History     Socioeconomic History    Marital status:      Spouse name: Not on file    Number of children: Not on file    Years of education: Not on file    Highest education level: Not on file   Social Needs    Financial resource strain: Not on file    Food insecurity - worry: Not on file    Food insecurity - inability: Not on file    Transportation needs - medical: Not on file    Transportation needs - non-medical: Not on file   Occupational History    Not on file   Tobacco Use    Smoking status: Unknown If Ever Smoked   Substance and Sexual Activity    Alcohol use: Not on file    Drug use: Not on file    Sexual activity: Not on file   Other Topics Concern    Not on file   Social History Narrative    Not on file       FH: History reviewed. No pertinent family history.    Review of Systems:  Constitutional: no fever or chills  Respiratory: no cough or shortness of breath  Cardiovascular: no chest pain or palpitations  Gastrointestinal: no nausea or vomiting, tolerating diet, negative for change in bowel habits  Genitourinary: no hematuria or dysuria, negative for urinary incontinence  Integument/Breast: no rash or pruritis, negative for breast lump, nipple discharge and skin lesion(s)  Hematologic/Lymphatic: no easy bruising or lymphadenopathy     OBJECTIVE:     Temp:  [96.8 °F (36 °C)-98.5 °F (36.9 °C)] 97.6 °F (36.4 °C)  Pulse:  [] 98  Resp:  [16-21] 18  SpO2:  [100 %] 100 %  BP: ()/(44-59) 96/46    Physical Exam:  GEN: No apparent distress. Alert and oriented.   CV: Regular rate and rhythm. Normal sounding S1 and S2. No murmur, rub, or gallop noted.  LUNGS: Clear to auscultation bilaterally. No wheezes, rales or rhonchi noted.  BREASTS: No tenderness or palpable masses. No skin changes or nipple discharge. No axillary lymphadenopathy. Normal breat  tissue  ABDOMEN: visibly distended and tympanic; bowel sounds present. No guarding or rebound tenderness.  EXT: No erythema, no edema  EXT. GENITALIA: appear normal, no lesions noted  VAGINA: Pink, moist, and well-rugated. No blood or discharge present, no lesions noted  CERVIX: Appears normal, no lesions noted, no cervical motion tenderness  UTERUS: normal contour, mobile, no fundal tenderness  ADNEXA: No palpable masses, no adnexal tenderness        ASSESSMENT/PLAN:     Patient Active Problem List   Diagnosis    Hepatic encephalopathy    Liver transplant candidate       Gynecologic malignancy screening. Low risk of malignancy at this time.  - Pap and HPV collected and sent to pathology lab. Will follow up results and contact patient if further management is indicated.  - Recommend yearly well woman exams as an outpatient    Thank you for the consult. Please call GYN with any questions.  Will sign off.    Tabitha Bello MD  OBGYN, PGY-1

## 2018-08-15 NOTE — PROGRESS NOTES
Para completed, pt tolerated well. No apparent distress noted. 2.9 Liters removed from left abd, mepore applied CDI. Labs collected and sent. Albumin 100 ml. Report called to VALENTINA Bentley. Pt awaiting transport.

## 2018-08-15 NOTE — SUBJECTIVE & OBJECTIVE
Review of Systems   Constitutional: Positive for activity change. Negative for appetite change, chills and fever.   HENT: Negative for trouble swallowing.    Respiratory: Negative for apnea, cough, choking, chest tightness and shortness of breath.    Cardiovascular: Negative for chest pain, palpitations and leg swelling.   Gastrointestinal: Positive for abdominal distention. Negative for abdominal pain, anal bleeding, blood in stool, constipation, diarrhea, nausea and rectal pain.   Genitourinary: Negative for difficulty urinating and dysuria.   Musculoskeletal: Negative for arthralgias and back pain.   Skin: Positive for pallor. Negative for color change.   Neurological: Negative for dizziness and headaches.   Psychiatric/Behavioral: Positive for confusion. Negative for agitation.       Past Medical History:   Diagnosis Date    Ascites     Cirrhosis     Esophageal varices     Hepatic encephalopathy     Hepatitis B     Hepatitis D virus infection     Hypersplenism     Portal hypertension        History reviewed. No pertinent surgical history.      Review of patient's allergies indicates:  No Known Allergies    Tobacco Use    Smoking status: Unknown If Ever Smoked   Substance and Sexual Activity    Alcohol use: Not on file    Drug use: Not on file    Sexual activity: Not on file       Medications Prior to Admission   Medication Sig Dispense Refill Last Dose    entecavir (BARACLUDE) 0.5 MG Tab Take 0.5 mg by mouth once daily.       insulin glargine (LANTUS) 100 unit/mL injection Inject 12 Units into the skin every evening.          Objective:     Vital Signs (Most Recent):  Temp: 97.3 °F (36.3 °C) (08/14/18 2325)  Pulse: (!) 111 (08/15/18 0700)  Resp: 16 (08/14/18 2325)  BP: (!) 105/53 (08/14/18 2325)  SpO2: 100 % (08/14/18 2325) Vital Signs (24h Range):  Temp:  [96.8 °F (36 °C)-98.5 °F (36.9 °C)] 97.3 °F (36.3 °C)  Pulse:  [] 111  Resp:  [16-21] 16  SpO2:  [100 %] 100 %  BP: ()/(44-59)  105/53     Weight: 56.8 kg (125 lb 3.2 oz) (08/15/18 0951)  Body mass index is 20.83 kg/m².    Physical Exam   Constitutional: She is oriented to person, place, and time. She appears well-developed. She appears cachectic.   HENT:   Head: Normocephalic.   Eyes: Pupils are equal, round, and reactive to light. No scleral icterus.   Neck: Normal range of motion. Neck supple.   Cardiovascular: Normal rate and regular rhythm.   Pulmonary/Chest: Effort normal and breath sounds normal.   Abdominal: Soft. Bowel sounds are normal. She exhibits distension. There is no tenderness.   Musculoskeletal: Normal range of motion.   Neurological: She is alert and oriented to person, place, and time.   Skin: Skin is warm and dry.   Psychiatric: She has a normal mood and affect.       MELD-Na score: 19 at 8/15/2018  6:22 AM  MELD score: 13 at 8/15/2018  6:22 AM  Calculated from:  Serum Creatinine: 1.4 mg/dL at 8/15/2018  6:22 AM  Serum Sodium: 130 mmol/L at 8/15/2018  6:22 AM  Total Bilirubin: 1.9 mg/dL at 8/15/2018  6:22 AM  INR(ratio): 1.1 at 8/15/2018  6:22 AM  Age: 68 years    Significant Labs:  CBC:   Recent Labs   Lab  08/15/18   0622   WBC  3.38*   RBC  2.83*   HGB  8.7*   HCT  24.8*   PLT  65*     BMP:   Recent Labs   Lab  08/15/18   0622   GLU  258*   NA  130*   K  3.3*   CL  107   CO2  12*   BUN  66*   CREATININE  1.4   CALCIUM  9.3     CMP:   Recent Labs   Lab  08/15/18   0622   GLU  258*   CALCIUM  9.3   ALBUMIN  3.7   PROT  5.7*   NA  130*   K  3.3*   CO2  12*   CL  107   BUN  66*   CREATININE  1.4   ALKPHOS  544*   ALT  131*   AST  139*   BILITOT  1.9*     Coagulation:   Recent Labs   Lab  08/15/18   0622   INR  1.1       Significant Imaging:  Labs: Reviewed  US: Reviewed

## 2018-08-15 NOTE — MEDICAL/APP STUDENT
[facility] Hospital Medicine  Progress Note    Patient Name: Scarlett Reddy  MRN: [id number]  Patient Class: IP- Inpatient   Admission Date: 2018  Length of Stay: 1 days  Attending Physician: Cristobal Rodrigues MD  Primary Care Provider: Primary Doctor [facility] Medicine Team: Networked reference to record PCT  Larry Garcia    Subjective:     Principal Problem:<principal problem not specified>    HPI: 68 year old female with a past medical history of Cirrhosis, hepatitis B and D (from blood transfusion due to an ), diabetes and portal hypertension presents to ED having been escorted by a doctor on a flight from [facility] after her family noticed a lack of communication. According to the doctor who escorted her, she had elevated NH3 levels (67). Over the last few hours, the patient became more drowsy and weak. The patient was cooperative, but required an  for the verbal answers. Over the last few months, the patient has had portal vein thrombosis on lovenox, hyponatremia, ascites, hepatic encephalopathy, esophageal varices with bleeding. She receives paracentesis every 7-10 days. Today, the patient is having an echo performed on her.      The patient has a history of hepatic encephalopathy as a result of hepatitis B and D. She also had an incidence of bilirubin being elevated 3 weeks ago at 4.4, but it eventually improved. She is scheduled to have Ultrasound of the Abdomen and IR paracentesis.         Hospital Course: The patient received a lactulose enema. She was also given insulin for diabetes (IV 4 units). She was also given Zofran IV 4 mg.     Interval History: The patient can sleep well, and she is more alert. She can get up and walk around (but requires some effort). She has no problems with bowel movements or urination. The patient denies fever.     Review of Systems   Constitutional: Positive for chills.   Eyes: Negative for photophobia and visual disturbance.    Respiratory: Negative for choking, shortness of breath and wheezing.    Cardiovascular: Negative for chest pain, palpitations and leg swelling.   Gastrointestinal: Positive for abdominal distention. Negative for abdominal pain, constipation, diarrhea, nausea and vomiting.   Genitourinary: Negative for difficulty urinating.   Musculoskeletal: Negative for arthralgias, joint swelling and myalgias.   Neurological: Negative for dizziness and headaches.   Psychiatric/Behavioral: The patient is not nervous/anxious.      Objective:     Vital Signs (Most Recent):  Temp: 97.3 °F (36.3 °C) (08/14/18 2325)  Pulse: (!) 111 (08/15/18 0700)  Resp: 16 (08/14/18 2325)  BP: (!) 105/53 (08/14/18 2325)  SpO2: 100 % (08/14/18 2325) Vital Signs (24h Range):  Temp:  [96.8 °F (36 °C)-98.5 °F (36.9 °C)] 97.3 °F (36.3 °C)  Pulse:  [] 111  Resp:  [16-21] 16  SpO2:  [100 %] 100 %  BP: ()/(43-59) 105/53     Weight: 58.4 kg (128 lb 12.8 oz)  Body mass index is 21.43 kg/m².    Intake/Output Summary (Last 24 hours) at 8/15/2018 0725  Last data filed at 8/15/2018 0600  Gross per 24 hour   Intake 370 ml   Output --   Net 370 ml      Physical Exam   Constitutional: She appears well-developed.   HENT:   Head: Normocephalic and atraumatic.   Eyes: Conjunctivae and EOM are normal. Pupils are equal, round, and reactive to light.   Cardiovascular: Normal rate, regular rhythm, normal heart sounds and intact distal pulses.   Pulmonary/Chest: Effort normal and breath sounds normal.   Abdominal: Bowel sounds are normal. She exhibits distension and ascites.   Musculoskeletal: Normal range of motion.   Neurological: She is alert.   Psychiatric: She has a normal mood and affect.       Significant Labs:   BMP:   Recent Labs   Lab  08/15/18   0622   GLU  258*   NA  130*   K  3.3*   CL  107   CO2  12*   BUN  66*   CREATININE  1.4   CALCIUM  9.3   MG  2.5     CBC:   Recent Labs   Lab  08/14/18   1149  08/15/18   0622   WBC  4.19  3.38*   HGB  9.7*   8.7*   HCT  28.2*  24.8*   PLT  85*  65*     CMP:   Recent Labs   Lab  08/14/18   1149  08/14/18   1937  08/14/18   2345  08/15/18   0622   NA  124*  131*  128*  130*   K  3.5  2.9*  2.8*  3.3*   CL  100  108  105  107   CO2  12*  12*  12*  12*   GLU  289*  157*  281*  258*   BUN  69*  68*  67*  66*   CREATININE  1.4  1.2  1.2  1.4   CALCIUM  9.5  9.3  9.1  9.3   PROT  6.6   --    --   5.7*   ALBUMIN  4.0   --    --   3.7   BILITOT  2.1*   --    --   1.9*   ALKPHOS  658*   --    --   544*   AST  156*   --    --   139*   ALT  160*   --    --   131*   ANIONGAP  12  11  11  11   EGFRNONAA  38.4*  46.2*  46.2*  38.4*     Coagulation:   Recent Labs   Lab  08/15/18   0622   INR  1.1     Lactic Acid:   Recent Labs   Lab  08/14/18   1149  08/15/18   0622   LACTATE  2.5*  2.5*     POCT Glucose:   Recent Labs   Lab  08/14/18   1602  08/14/18   2207  08/15/18   0730   POCTGLUCOSE  137*  217*  272*       Significant Imaging: I have reviewed all pertinent imaging results/findings within the past 24 hours.The lungs were clear. The osseous thorax is intact. An IR paracentesis was ordered, and the results of a US Abdomen are pending.     Assessment/Plan:      Active Diagnoses:    Diagnosis Date Noted POA    Hepatic encephalopathy [K72.90] 08/14/2018 Yes      Problems Resolved During this Admission:     VTE Risk Mitigation (From admission, onward)        Ordered     enoxaparin injection 50 mg  Daily      08/14/18 1913         Main Concern- Hepatic Encephalopathy  - Hepatic Encephalopathy    * Patient was given a lactulose enema (traps ammonia)- traps ammonia in colon and uses gut james to acidify colon, transforming ammonia into ammonium, which cannot diffuse into blood.      * The patient has been improving as far as mentation was concerned. She is able to follow basic commands.           Cirrhosis/Ascities/Hepatitis B and D- Patient has history of Cirrhosis as a complication of hepatitis B and D. She was infected by Hepatitis B  due to blood transfusion due to an . Hepatitis D can only appear as a result of the presence of hepatitis B. She is undergoing treatment with Entecavir (a nucleoside analog that reduces ability of HBV in the blood by reducing ability to multiply and infect new cells) for hepatitis B. Due to hepatitis, there is loss of ability to to synthesize albumin, so there is loss of oncotic pressure to pull water into the circulatory system. Therefore, ascites results.  The patient is undergoing paracentesis to remove fluid. Also, it is important to make sure there is no infection in the fluid.      Esophageal varices/Portal hypertension   - Beta blockers- Patient is on propanolol, which is a nonselective beta blocker. This is for primary prophylaxis for varices. The nonselective beta blocker acts on B1 leading to decreased cardiac output, and B2, leading to splanchnic vasoconstriction.     Diabetes (Type 2)    -Keep on diabetic diet.   - results for HbA1c did not arive  - Continue IV insulin at night.   - POCT glucose high at 258.     Hyponatremia  - may need fluid restriction. This could be due to hypervolemia. Administer IV albumin. Sodium levels have improved from 128-130.   - Monitor sodium levels    DVT PPx  - Important because patient can develop stasis by remaining in the same position.   - Make sure patient is in different positions.   - Enoxaparin- LMWH; subcutaneous injection.         Larry Garcia  Department of Hospital Medicine   [facility]

## 2018-08-15 NOTE — PLAN OF CARE
Problem: Patient Care Overview  Goal: Plan of Care Review  Outcome: Ongoing (interventions implemented as appropriate)  Patient alert and disoriented to location. Patient very weak and feels nauseuos when taking medications. Son and  at bedside. Patient informed of NPO status for paracentesis in AM. No other findings overnight. Patient safety maintained. WCTM.

## 2018-08-15 NOTE — PLAN OF CARE
Patient from [facility] to [facility] for inpatient liver transplant evaluation        08/15/18 0476   Discharge Assessment   Assessment Type Discharge Planning Assessment   Assessment information obtained from? Patient;Caregiver   Prior to hospitilization cognitive status: Unable to Assess   Prior to hospitalization functional status: Needs Assistance   Current cognitive status: Alert/Oriented   Current Functional Status: Needs Assistance   Lives With spouse   Able to Return to Prior Arrangements unable to determine at this time (comments)   Is patient able to care for self after discharge? Unable to determine at this time (comments)   Patient's perception of discharge disposition home or selfcare   Do you have any problems affording any of your prescribed medications? TBD   Discharge Plan A Home with family   Discharge Plan B Home with family

## 2018-08-15 NOTE — H&P (VIEW-ONLY)
[facility]  Hepatology  Consult Note    Patient Name: Scarlett Reddy  MRN: [id number]  Admission Date: 8/14/2018  Hospital Length of Stay: 1 days  Attending Provider: Cristobal Rodrigues MD   Primary Care Physician: Primary Doctor No  Principal Problem:<principal problem not specified>    Inpatient consult to Hepatology  Consult performed by: Anupama Somers MD  Consult ordered by: Cristobal Rodrigues MD        Subjective:     Transplant status: No    HPI:  Scarlett Reddy is a 69 y/o female with past medical history of Hep B and D cirrhosis, on entecavir with undetectable Hep B DNA, c/b EV with bleeding, HE, PV thrombosis, and refractory ascites, T2DM, who presented as a transfer from [facility] for evaluation of liver transplant.     Per records, the patient has been declining recently with hyponatremia due to diuretics (Na of 121), that improvement to 129 with holding diuresis, but now requiring paracentesis every 7-10 days.   She presented to a hospital in [facility] for progressive weakness, found to have worsening hyponatremia.  Per son report, the patient declined rapidly at the end of the trip to [place], and had to be transported by EMS.    Patient today states that she is feeling well .She states that she does not remember the details of her trip. She states that she had a paracentesis just before flying to [place]. She denies f/c, denies N/V, deines abdominal pain. Endorses abdominal swelling. Denies melena or hematemesis    The patient notes she was active and independent prior to these event. She denies alcohol use.     Labs on presentation are notable for PLT of 85, Na 124, Cr 1.4, ALKP 658, Tbili 2.1, AST//160     Review of Systems   Constitutional: Positive for activity change. Negative for appetite change, chills and fever.   HENT: Negative for trouble swallowing.    Respiratory: Negative for apnea, cough, choking, chest tightness and shortness of breath.    Cardiovascular: Negative for  chest pain, palpitations and leg swelling.   Gastrointestinal: Positive for abdominal distention. Negative for abdominal pain, anal bleeding, blood in stool, constipation, diarrhea, nausea and rectal pain.   Genitourinary: Negative for difficulty urinating and dysuria.   Musculoskeletal: Negative for arthralgias and back pain.   Skin: Positive for pallor. Negative for color change.   Neurological: Negative for dizziness and headaches.   Psychiatric/Behavioral: Positive for confusion. Negative for agitation.       Past Medical History:   Diagnosis Date    Ascites     Cirrhosis     Esophageal varices     Hepatic encephalopathy     Hepatitis B     Hepatitis D virus infection     Hypersplenism     Portal hypertension        History reviewed. No pertinent surgical history.      Review of patient's allergies indicates:  No Known Allergies    Tobacco Use    Smoking status: Unknown If Ever Smoked   Substance and Sexual Activity    Alcohol use: Not on file    Drug use: Not on file    Sexual activity: Not on file       Medications Prior to Admission   Medication Sig Dispense Refill Last Dose    entecavir (BARACLUDE) 0.5 MG Tab Take 0.5 mg by mouth once daily.       insulin glargine (LANTUS) 100 unit/mL injection Inject 12 Units into the skin every evening.          Objective:     Vital Signs (Most Recent):  Temp: 97.3 °F (36.3 °C) (08/14/18 2325)  Pulse: (!) 111 (08/15/18 0700)  Resp: 16 (08/14/18 2325)  BP: (!) 105/53 (08/14/18 2325)  SpO2: 100 % (08/14/18 2325) Vital Signs (24h Range):  Temp:  [96.8 °F (36 °C)-98.5 °F (36.9 °C)] 97.3 °F (36.3 °C)  Pulse:  [] 111  Resp:  [16-21] 16  SpO2:  [100 %] 100 %  BP: ()/(44-59) 105/53     Weight: 56.8 kg (125 lb 3.2 oz) (08/15/18 0951)  Body mass index is 20.83 kg/m².    Physical Exam   Constitutional: She is oriented to person, place, and time. She appears well-developed. She appears cachectic.   HENT:   Head: Normocephalic.   Eyes: Pupils are equal, round,  and reactive to light. No scleral icterus.   Neck: Normal range of motion. Neck supple.   Cardiovascular: Normal rate and regular rhythm.   Pulmonary/Chest: Effort normal and breath sounds normal.   Abdominal: Soft. Bowel sounds are normal. She exhibits distension. There is no tenderness.   Musculoskeletal: Normal range of motion.   Neurological: She is alert and oriented to person, place, and time.   Skin: Skin is warm and dry.   Psychiatric: She has a normal mood and affect.       MELD-Na score: 19 at 8/15/2018  6:22 AM  MELD score: 13 at 8/15/2018  6:22 AM  Calculated from:  Serum Creatinine: 1.4 mg/dL at 8/15/2018  6:22 AM  Serum Sodium: 130 mmol/L at 8/15/2018  6:22 AM  Total Bilirubin: 1.9 mg/dL at 8/15/2018  6:22 AM  INR(ratio): 1.1 at 8/15/2018  6:22 AM  Age: 68 years    Significant Labs:  CBC:   Recent Labs   Lab  08/15/18   0622   WBC  3.38*   RBC  2.83*   HGB  8.7*   HCT  24.8*   PLT  65*     BMP:   Recent Labs   Lab  08/15/18   0622   GLU  258*   NA  130*   K  3.3*   CL  107   CO2  12*   BUN  66*   CREATININE  1.4   CALCIUM  9.3     CMP:   Recent Labs   Lab  08/15/18   0622   GLU  258*   CALCIUM  9.3   ALBUMIN  3.7   PROT  5.7*   NA  130*   K  3.3*   CO2  12*   CL  107   BUN  66*   CREATININE  1.4   ALKPHOS  544*   ALT  131*   AST  139*   BILITOT  1.9*     Coagulation:   Recent Labs   Lab  08/15/18   0622   INR  1.1       Significant Imaging:  Labs: Reviewed  US: Reviewed    Assessment/Plan:     Hepatic encephalopathy    Scarlett Reddy is a 69 y/o female with past medical history of Hep B and D cirrhosis, on entecavir with undetectable Hep B DNA, c/b EV with bleeding, HE, PV thrombosis, and refractory ascites, T2DM, who presented as a transfer from [facility] for evaluation of liver transplant.     Patient current status is decompensated with HE, refractory ascites and hyponatremia. Will initiate workup for liver transplant.    Plan:  -Decompensated liver cirrhosis secondary to Hep B: to Undergo paracentesis,  and evaluation with U/S with doppler today. Continue with lactulose for HE; seems to be improving. Started on rocephin pending SBP r/o.  -Hyponatremia: Agree with holding diuresis and fluid restriction.  -EV: propranolol for secondary PPx. Holding in the setting of hypotension on admission.  -No history of HCC. AFP ordered  -Liver transplant Candidacy, will plan to initiate workup for transplant. GYN, nutrition, ID consults, lab workup, and dobutamine testing. PETH testing ordered.  -f/u daily CMP, INR.            Thank you for your consult. I will follow-up with patient. Please contact us if you have any additional questions.    John Somers MD  Hepatology  [facility]

## 2018-08-15 NOTE — PROGRESS NOTES
Pt arrived to MPU room 2 for PARA, no acute distress noted. Orders and labs reviewed on chart. Awaiting consent.  at bedside.

## 2018-08-15 NOTE — PROGRESS NOTES
Hospital Medicine  Progress note    Team: Networked reference to record PCT  Cristobal Rodrigues MD  Admit Date: 8/14/2018  AJIT 8/21/2018  Code status: Full Code    Principal Problem:  Hepatic encephalopathy    Interval hx:      ROS   Review of Systems   Constitutional: Negative for chills and fever.   HENT: Negative for ear discharge.    Eyes: Negative for blurred vision and double vision.   Cardiovascular: Negative for chest pain.   Gastrointestinal: Negative for nausea and vomiting.        Abdominal distention    Genitourinary: Negative for dysuria and urgency.   Musculoskeletal: Negative.    Skin: Negative for rash.   Neurological: Negative for tremors, sensory change and speech change.   Psychiatric/Behavioral: Negative.          PEx  Temp:  [96.8 °F (36 °C)-97.6 °F (36.4 °C)]   Pulse:  []   Resp:  [16-20]   BP: ()/(44-59)   SpO2:  [100 %]     Intake/Output Summary (Last 24 hours) at 8/15/2018 1448  Last data filed at 8/15/2018 0600  Gross per 24 hour   Intake 370 ml   Output --   Net 370 ml     Physical Exam   Constitutional: She is oriented to person, place, and time and well-developed, well-nourished, and in no distress.   HENT:   Head: Normocephalic and atraumatic.   Eyes: Pupils are equal, round, and reactive to light.   Neck: Normal range of motion. Neck supple. No thyromegaly present.   Cardiovascular: Normal rate, regular rhythm and normal heart sounds.   Pulmonary/Chest: Effort normal and breath sounds normal.   Abdominal: Soft. Bowel sounds are normal. She exhibits distension. There is no tenderness. There is no guarding.   Musculoskeletal: Normal range of motion.   Neurological: She is alert and oriented to person, place, and time.   Skin: Skin is warm and dry. No rash noted. No erythema.     Recent Labs   Lab  08/14/18   1149  08/15/18   0622   WBC  4.19  3.38*   HGB  9.7*  8.7*   HCT  28.2*  24.8*   PLT  85*  65*     Recent Labs   Lab  08/14/18   1149  08/14/18   1937  08/14/18   3347   08/15/18   0622   NA  124*  131*  128*  130*   K  3.5  2.9*  2.8*  3.3*   CL  100  108  105  107   CO2  12*  12*  12*  12*   BUN  69*  68*  67*  66*   CREATININE  1.4  1.2  1.2  1.4   GLU  289*  157*  281*  258*   CALCIUM  9.5  9.3  9.1  9.3   MG  2.7*   --    --   2.5   PHOS   --    --    --   4.0   LIPASE  196*   --    --    --      Recent Labs   Lab  08/14/18   1149  08/15/18   0622   ALKPHOS  658*  544*   ALT  160*  131*   AST  156*  139*   ALBUMIN  4.0  3.7   PROT  6.6  5.7*   BILITOT  2.1*  1.9*   INR  1.0  1.1      Recent Labs   Lab  08/14/18   1602  08/14/18   2207  08/15/18   0730  08/15/18   1127   POCTGLUCOSE  137*  217*  272*  300*     Recent Labs      08/14/18   1149   TROPONINI  0.017       Scheduled Meds:   cefTRIAXone (ROCEPHIN) IVPB  1 g Intravenous Q24H    enoxaparin  50 mg Subcutaneous Q12H    insulin aspart U-100  0-5 Units Subcutaneous TIDWM    insulin detemir U-100  20 Units Subcutaneous QHS    lactulose  30 g Oral TID    midodrine  5 mg Oral TID    sodium bicarbonate  1,300 mg Oral TID     Continuous Infusions:  As Needed:  dextrose 50%, dextrose 50%, glucagon (human recombinant), glucose, glucose    Active Hospital Problems    Diagnosis  POA    *Hepatic encephalopathy [K72.90]  Yes     Priority: 1 - High    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 [B18.0, K74.60]  Yes    Type 2 diabetes mellitus, with long-term current use of insulin [E11.9, Z79.4]  Not Applicable    Esophageal varices without bleeding [I85.00]  Yes    Liver transplant candidate [Z76.82]  Not Applicable      Resolved Hospital Problems   No resolved problems to display.       Overview    69 yo F with with above mentioned medical history presents with confusion and decompensated liver cirrhosis, admitted for expedited liver transplant evaluation    Assessment and Plan for Problems addressed today:    Hepatic Encephalopathy  - ammonia levels high at 67  - Started on PO Lactulose, one time rectal lactulose  given in the ER  - AAO X 3     Decompensated Liver cirrhosis due to Hep B And D  MELD-Na score: 19 at 8/15/2018  6:22 AM  MELD score: 13 at 8/15/2018  6:22 AM  Calculated from:  Serum Creatinine: 1.4 mg/dL at 8/15/2018  6:22 AM  Serum Sodium: 130 mmol/L at 8/15/2018  6:22 AM  Total Bilirubin: 1.9 mg/dL at 8/15/2018  6:22 AM  INR(ratio): 1.1 at 8/15/2018  6:22 AM  Age: 68 years  - Hold beta blockers due to hypotension  - start IV Ceftriaxone until SBP Ruled out  - UA/Urine culture  - blood cultures   - Fluid restriction  - US liver doppler  - Hepatology consult  - AFP 2.9  - Lactic acid 2.5   - PETH pending  - IR paracentesis performed today     Metabolic acidosis  - VBG: metabolic acidosis with respiratory alkalosis, normal pH  - start sodium bicarbonate tablets      Diabetes type 2  - POCT q ACHS  - low dose sliding scale   - HgbA1c pending  - cont insulin at night, 20 units      Hyponatremia  - most likely hypervolemic  - fluid restriction  - Urine sodium  - 124-- 131--128     Portal Vein thrombosis  - US liver doppler: no evidence of thrombus   - on Lovenox SQ daily      Diet:  Low sodium  DVT PPx:    Anticoagulants   Medication Route Frequency    enoxaparin injection 50 mg Subcutaneous Q12H     Goals of Care: Full code    Discharge plan and follow up    Provider    Cristobal Melendez MD  Staff Hospitalist  Department of Hospital Medicine  [facility]   [phone]

## 2018-08-15 NOTE — ASSESSMENT & PLAN NOTE
Scarlett Reddy is a 67 y/o female with past medical history of Hep B and D cirrhosis, on entecavir with undetectable Hep B DNA, c/b EV with bleeding, HE, PV thrombosis, and refractory ascites, T2DM, who presented as a transfer from [facility] for evaluation of liver transplant.     Patient current status is decompensated with HE, refractory ascites and hyponatremia. Will initiate workup for liver transplant.    Plan:  -Decompensated liver cirrhosis secondary to Hep B: to Undergo paracentesis, and evaluation with U/S with doppler today. Continue with lactulose for HE; seems to be improving. Started on rocephin pending SBP r/o.  -Hyponatremia: Agree with holding diuresis and fluid restriction.  -EV: propranolol for secondary PPx. Holding in the setting of hypotension on admission.  -No history of HCC. AFP ordered  -Liver transplant Candidacy, will plan to initiate workup for transplant. GYN, nutrition, ID consults, lab workup, and dobutamine testing. PETH testing ordered.  -f/u daily CMP, INR.

## 2018-08-15 NOTE — CONSULTS
[facility]  Infectious Disease  Consult Note    Patient Name: Scarlett Reddy  MRN: [id number]  Admission Date: 8/14/2018  Hospital Length of Stay: 1 days  Attending Physician: Cristobal Rodrigues MD  Primary Care Provider: Primary Doctor No       Inpatient consult to Infectious Diseases  Consult performed by: Cassie Fields PA-C  Consult ordered by: Cristobal Rodrigues MD          ID consult received for liver transplant evaluation. Chart being reviewed. Serologies ordered but have not been drawn yet. Full consult note with recommendations to follow once serologies have resulted.      Thank you,  Cassie Fields PA-C  [phone]

## 2018-08-15 NOTE — CONSULTS
[facility]  Hepatology  Consult Note    Patient Name: Scarlett Reddy  MRN: [id number]  Admission Date: 8/14/2018  Hospital Length of Stay: 1 days  Attending Provider: Cristobal Rodrigues MD   Primary Care Physician: Primary Doctor No  Principal Problem:<principal problem not specified>    Inpatient consult to Hepatology  Consult performed by: Anupama Somers MD  Consult ordered by: Cristobal Rodrigues MD        Subjective:     Transplant status: No    HPI:  Scarlett Reddy is a 69 y/o female with past medical history of Hep B and D cirrhosis, on entecavir with undetectable Hep B DNA, c/b EV with bleeding, HE, PV thrombosis, and refractory ascites, T2DM, who presented as a transfer from [facility] for evaluation of liver transplant.     Per records, the patient has been declining recently with hyponatremia due to diuretics (Na of 121), that improvement to 129 with holding diuresis, but now requiring paracentesis every 7-10 days.   She presented to a hospital in [facility] for progressive weakness, found to have worsening hyponatremia.  Per son report, the patient declined rapidly at the end of the trip to [place], and had to be transported by EMS.    Patient today states that she is feeling well .She states that she does not remember the details of her trip. She states that she had a paracentesis just before flying to [place]. She denies f/c, denies N/V, deines abdominal pain. Endorses abdominal swelling. Denies melena or hematemesis    The patient notes she was active and independent prior to these event. She denies alcohol use.     Labs on presentation are notable for PLT of 85, Na 124, Cr 1.4, ALKP 658, Tbili 2.1, AST//160     Review of Systems   Constitutional: Positive for activity change. Negative for appetite change, chills and fever.   HENT: Negative for trouble swallowing.    Respiratory: Negative for apnea, cough, choking, chest tightness and shortness of breath.    Cardiovascular: Negative for  chest pain, palpitations and leg swelling.   Gastrointestinal: Positive for abdominal distention. Negative for abdominal pain, anal bleeding, blood in stool, constipation, diarrhea, nausea and rectal pain.   Genitourinary: Negative for difficulty urinating and dysuria.   Musculoskeletal: Negative for arthralgias and back pain.   Skin: Positive for pallor. Negative for color change.   Neurological: Negative for dizziness and headaches.   Psychiatric/Behavioral: Positive for confusion. Negative for agitation.       Past Medical History:   Diagnosis Date    Ascites     Cirrhosis     Esophageal varices     Hepatic encephalopathy     Hepatitis B     Hepatitis D virus infection     Hypersplenism     Portal hypertension        History reviewed. No pertinent surgical history.      Review of patient's allergies indicates:  No Known Allergies    Tobacco Use    Smoking status: Unknown If Ever Smoked   Substance and Sexual Activity    Alcohol use: Not on file    Drug use: Not on file    Sexual activity: Not on file       Medications Prior to Admission   Medication Sig Dispense Refill Last Dose    entecavir (BARACLUDE) 0.5 MG Tab Take 0.5 mg by mouth once daily.       insulin glargine (LANTUS) 100 unit/mL injection Inject 12 Units into the skin every evening.          Objective:     Vital Signs (Most Recent):  Temp: 97.3 °F (36.3 °C) (08/14/18 2325)  Pulse: (!) 111 (08/15/18 0700)  Resp: 16 (08/14/18 2325)  BP: (!) 105/53 (08/14/18 2325)  SpO2: 100 % (08/14/18 2325) Vital Signs (24h Range):  Temp:  [96.8 °F (36 °C)-98.5 °F (36.9 °C)] 97.3 °F (36.3 °C)  Pulse:  [] 111  Resp:  [16-21] 16  SpO2:  [100 %] 100 %  BP: ()/(44-59) 105/53     Weight: 56.8 kg (125 lb 3.2 oz) (08/15/18 0951)  Body mass index is 20.83 kg/m².    Physical Exam   Constitutional: She is oriented to person, place, and time. She appears well-developed. She appears cachectic.   HENT:   Head: Normocephalic.   Eyes: Pupils are equal, round,  and reactive to light. No scleral icterus.   Neck: Normal range of motion. Neck supple.   Cardiovascular: Normal rate and regular rhythm.   Pulmonary/Chest: Effort normal and breath sounds normal.   Abdominal: Soft. Bowel sounds are normal. She exhibits distension. There is no tenderness.   Musculoskeletal: Normal range of motion.   Neurological: She is alert and oriented to person, place, and time.   Skin: Skin is warm and dry.   Psychiatric: She has a normal mood and affect.       MELD-Na score: 19 at 8/15/2018  6:22 AM  MELD score: 13 at 8/15/2018  6:22 AM  Calculated from:  Serum Creatinine: 1.4 mg/dL at 8/15/2018  6:22 AM  Serum Sodium: 130 mmol/L at 8/15/2018  6:22 AM  Total Bilirubin: 1.9 mg/dL at 8/15/2018  6:22 AM  INR(ratio): 1.1 at 8/15/2018  6:22 AM  Age: 68 years    Significant Labs:  CBC:   Recent Labs   Lab  08/15/18   0622   WBC  3.38*   RBC  2.83*   HGB  8.7*   HCT  24.8*   PLT  65*     BMP:   Recent Labs   Lab  08/15/18   0622   GLU  258*   NA  130*   K  3.3*   CL  107   CO2  12*   BUN  66*   CREATININE  1.4   CALCIUM  9.3     CMP:   Recent Labs   Lab  08/15/18   0622   GLU  258*   CALCIUM  9.3   ALBUMIN  3.7   PROT  5.7*   NA  130*   K  3.3*   CO2  12*   CL  107   BUN  66*   CREATININE  1.4   ALKPHOS  544*   ALT  131*   AST  139*   BILITOT  1.9*     Coagulation:   Recent Labs   Lab  08/15/18   0622   INR  1.1       Significant Imaging:  Labs: Reviewed  US: Reviewed    Assessment/Plan:     Hepatic encephalopathy    Scarlett Reddy is a 69 y/o female with past medical history of Hep B and D cirrhosis, on entecavir with undetectable Hep B DNA, c/b EV with bleeding, HE, PV thrombosis, and refractory ascites, T2DM, who presented as a transfer from [facility] for evaluation of liver transplant.     Patient current status is decompensated with HE, refractory ascites and hyponatremia. Will initiate workup for liver transplant.    Plan:  -Decompensated liver cirrhosis secondary to Hep B: to Undergo paracentesis,  and evaluation with U/S with doppler today. Continue with lactulose for HE; seems to be improving. Started on rocephin pending SBP r/o.  -Hyponatremia: Agree with holding diuresis and fluid restriction.  -EV: propranolol for secondary PPx. Holding in the setting of hypotension on admission.  -No history of HCC. AFP ordered  -Liver transplant Candidacy, will plan to initiate workup for transplant. GYN, nutrition, ID consults, lab workup, and dobutamine testing. PETH testing ordered.  -f/u daily CMP, INR.            Thank you for your consult. I will follow-up with patient. Please contact us if you have any additional questions.    John Somers MD  Hepatology  [facility]

## 2018-08-15 NOTE — H&P
Inpatient Radiology Pre-procedure Note    History of Present Illness:  Scarlett Reddy is a 68 y.o. female who presents for US guided paracentesis.  Admission H&P reviewed.  Past Medical History:   Diagnosis Date    Ascites     Cirrhosis     Esophageal varices     Hepatic encephalopathy     Hepatitis B     Hepatitis D virus infection     Hypersplenism     Portal hypertension      History reviewed. No pertinent surgical history.    Review of Systems:   As documented in primary team H&P    Home Meds:   Prior to Admission medications    Medication Sig Start Date End Date Taking? Authorizing Provider   entecavir (BARACLUDE) 0.5 MG Tab Take 0.5 mg by mouth once daily.   Yes Historical Provider, MD   insulin glargine (LANTUS) 100 unit/mL injection Inject 12 Units into the skin every evening.   Yes Historical Provider, MD     Scheduled Meds:    cefTRIAXone (ROCEPHIN) IVPB  1 g Intravenous Q24H    enoxaparin  50 mg Subcutaneous Q12H    insulin aspart U-100  0-5 Units Subcutaneous TIDWM    insulin detemir U-100  20 Units Subcutaneous QHS    lactulose  30 g Oral TID    midodrine  5 mg Oral TID    sodium bicarbonate  1,300 mg Oral TID     Continuous Infusions:   PRN Meds:dextrose 50%, dextrose 50%, glucagon (human recombinant), glucose, glucose  Anticoagulants/Antiplatelets: lovenox    Allergies: Review of patient's allergies indicates:  No Known Allergies  Sedation Hx: have not been any systemic reactions    Labs:  Recent Labs   Lab  08/15/18   0622   INR  1.1       Recent Labs   Lab  08/15/18   0622   WBC  3.38*   HGB  8.7*   HCT  24.8*   MCV  88   PLT  65*      Recent Labs   Lab  08/15/18   0622   GLU  258*   NA  130*   K  3.3*   CL  107   CO2  12*   BUN  66*   CREATININE  1.4   CALCIUM  9.3   MG  2.5   ALT  131*   AST  139*   ALBUMIN  3.7   BILITOT  1.9*         Vitals:  Temp: 97.6 °F (36.4 °C) (08/15/18 1237)  Pulse: 103 (08/15/18 1355)  Resp: 18 (08/15/18 1355)  BP: (!) 113/58 (08/15/18 1355)  SpO2: 100 %  (08/15/18 8959)     Physical Exam:  ASA: 2  Mallampati: 2    General: no acute distress  Mental Status: alert and oriented to person, place and time  HEENT: normocephalic, atraumatic  Chest: unlabored breathing  Heart: regular heart rate  Abdomen: distended  Extremity: moves all extremities    Plan: US guided paracentesis  Sedation Plan: local    Jae Ventura MD  Resident  Department of Radiology  Pager: [phone]

## 2018-08-15 NOTE — PROGRESS NOTES
TIMBO presented to meet with the patient to complete her inpt psychosocial evaluation.  The patient was accompanied by her  Andrés Ramirez and the Romanian  Jailyn. The pt was observed to be alert, oriented and communicative.  SW was unable to fully assess the pts orientation as she would provide answers to questions that SW did not answer but when she was redirected was able to participate.  TIMBO explained the inpatient evaluation process and the patient reported she was unsure of her caregiver plan and asked that SW present to complete the assessment when her son Justin Ramirez was present.  The pt reported Justin was very tired and went to rest in the  room 582.      TIMBO provided this SW contact information and the Romanian  Jailyn reported she will alert Katerin YUNG, [employer]  of the encounter on today and explain what all is needed to complete the assessment.  Jailyn reported she will be present to interpret on today and will explain to Justin that the pt should call SW to return in his presence.  Patient and  Andrés verbalized understanding and agreement with the information reviewed, social work availability, and how to access available resources if needed. SW remains available.     TIMBO notified Liver Transplant Team.

## 2018-08-16 ENCOUNTER — DOCUMENTATION ONLY (OUTPATIENT)
Dept: CARDIOLOGY | Facility: CLINIC | Age: 68
End: 2018-08-16

## 2018-08-16 ENCOUNTER — DOCUMENTATION ONLY (OUTPATIENT)
Dept: TRANSPLANT | Facility: CLINIC | Age: 68
End: 2018-08-16

## 2018-08-16 PROBLEM — D64.9 ANEMIA REQUIRING TRANSFUSIONS: Status: ACTIVE | Noted: 2018-08-16

## 2018-08-16 PROBLEM — E43 SEVERE MALNUTRITION: Status: ACTIVE | Noted: 2018-08-16

## 2018-08-16 LAB
ABO + RH BLD: NORMAL
ALBUMIN SERPL BCP-MCNC: 4 G/DL
ALP SERPL-CCNC: 443 U/L
ALT SERPL W/O P-5'-P-CCNC: 122 U/L
ANION GAP SERPL CALC-SCNC: 11 MMOL/L
AST SERPL-CCNC: 149 U/L
BACTERIA #/AREA URNS AUTO: ABNORMAL /HPF
BASOPHILS # BLD AUTO: 0.01 K/UL
BASOPHILS # BLD AUTO: 0.02 K/UL
BASOPHILS NFR BLD: 0.4 %
BASOPHILS NFR BLD: 0.7 %
BILIRUB SERPL-MCNC: 1.8 MG/DL
BILIRUB UR QL STRIP: NEGATIVE
BLD GP AB SCN CELLS X3 SERPL QL: NORMAL
BLD PROD TYP BPU: NORMAL
BLOOD UNIT EXPIRATION DATE: NORMAL
BLOOD UNIT TYPE CODE: 6200
BLOOD UNIT TYPE: NORMAL
BUN SERPL-MCNC: 62 MG/DL
CALCIUM SERPL-MCNC: 8.8 MG/DL
CHLORIDE SERPL-SCNC: 105 MMOL/L
CLARITY UR REFRACT.AUTO: ABNORMAL
CO2 SERPL-SCNC: 13 MMOL/L
CODING SYSTEM: NORMAL
COLOR UR AUTO: ABNORMAL
CREAT SERPL-MCNC: 1.4 MG/DL
DIFFERENTIAL METHOD: ABNORMAL
DIFFERENTIAL METHOD: ABNORMAL
DISPENSE STATUS: NORMAL
EOSINOPHIL # BLD AUTO: 0.1 K/UL
EOSINOPHIL # BLD AUTO: 0.1 K/UL
EOSINOPHIL NFR BLD: 2 %
EOSINOPHIL NFR BLD: 2.1 %
ERYTHROCYTE [DISTWIDTH] IN BLOOD BY AUTOMATED COUNT: 19.7 %
ERYTHROCYTE [DISTWIDTH] IN BLOOD BY AUTOMATED COUNT: 20.2 %
EST. GFR  (AFRICAN AMERICAN): 44.2 ML/MIN/1.73 M^2
EST. GFR  (NON AFRICAN AMERICAN): 38.4 ML/MIN/1.73 M^2
ESTIMATED AVG GLUCOSE: 148 MG/DL
FIBRINOGEN PPP-MCNC: 262 MG/DL
GLUCOSE SERPL-MCNC: 271 MG/DL
GLUCOSE UR QL STRIP: NEGATIVE
HBA1C MFR BLD HPLC: 6.8 %
HCT VFR BLD AUTO: 20.5 %
HCT VFR BLD AUTO: 26 %
HCT VFR BLD AUTO: 26.2 %
HGB BLD-MCNC: 6.8 G/DL
HGB BLD-MCNC: 8.5 G/DL
HGB BLD-MCNC: 8.7 G/DL
HGB UR QL STRIP: ABNORMAL
IMM GRANULOCYTES # BLD AUTO: 0.02 K/UL
IMM GRANULOCYTES # BLD AUTO: 0.02 K/UL
IMM GRANULOCYTES NFR BLD AUTO: 0.7 %
IMM GRANULOCYTES NFR BLD AUTO: 0.8 %
INR PPP: 1.2
KETONES UR QL STRIP: NEGATIVE
LACTATE SERPL-SCNC: 2 MMOL/L
LEUKOCYTE ESTERASE UR QL STRIP: ABNORMAL
LYMPHOCYTES # BLD AUTO: 0.4 K/UL
LYMPHOCYTES # BLD AUTO: 0.5 K/UL
LYMPHOCYTES NFR BLD: 16.8 %
LYMPHOCYTES NFR BLD: 18.9 %
MAGNESIUM SERPL-MCNC: 2.4 MG/DL
MCH RBC QN AUTO: 29.7 PG
MCH RBC QN AUTO: 30.4 PG
MCHC RBC AUTO-ENTMCNC: 33.2 G/DL
MCHC RBC AUTO-ENTMCNC: 33.2 G/DL
MCV RBC AUTO: 90 FL
MCV RBC AUTO: 92 FL
MICROSCOPIC COMMENT: ABNORMAL
MITOGEN IGNF BCKGRD COR BLD-ACNC: 0.04 IU/ML
MITOGEN NIL: 6.79 IU/ML
MONOCYTES # BLD AUTO: 0.4 K/UL
MONOCYTES # BLD AUTO: 0.4 K/UL
MONOCYTES NFR BLD: 14.1 %
MONOCYTES NFR BLD: 14.9 %
NEUTROPHILS # BLD AUTO: 1.7 K/UL
NEUTROPHILS # BLD AUTO: 1.8 K/UL
NEUTROPHILS NFR BLD: 62.7 %
NEUTROPHILS NFR BLD: 65.9 %
NITRITE UR QL STRIP: NEGATIVE
NRBC BLD-RTO: 0 /100 WBC
NRBC BLD-RTO: 0 /100 WBC
PH UR STRIP: 5 [PH] (ref 5–8)
PHOSPHATE SERPL-MCNC: 2.9 MG/DL
PLATELET # BLD AUTO: 53 K/UL
PLATELET # BLD AUTO: 58 K/UL
PMV BLD AUTO: 10.8 FL
PMV BLD AUTO: 11.2 FL
POCT GLUCOSE: 146 MG/DL (ref 70–110)
POCT GLUCOSE: 157 MG/DL (ref 70–110)
POCT GLUCOSE: 159 MG/DL (ref 70–110)
POCT GLUCOSE: 263 MG/DL (ref 70–110)
POCT GLUCOSE: 339 MG/DL (ref 70–110)
POTASSIUM SERPL-SCNC: 3.6 MMOL/L
PROCALCITONIN SERPL IA-MCNC: 0.25 NG/ML
PROT SERPL-MCNC: 5.6 G/DL
PROT UR QL STRIP: NEGATIVE
PROTHROMBIN TIME: 12.4 SEC
RBC # BLD AUTO: 2.29 M/UL
RBC # BLD AUTO: 2.86 M/UL
RBC #/AREA URNS AUTO: 20 /HPF (ref 0–4)
RPR SER QL: NORMAL
SODIUM SERPL-SCNC: 129 MMOL/L
SODIUM UR-SCNC: <20 MMOL/L
SP GR UR STRIP: 1.02 (ref 1–1.03)
SQUAMOUS #/AREA URNS AUTO: 2 /HPF
TB GOLD PLUS: NEGATIVE
TB1 - NIL: 0.02 IU/ML
TB2 - NIL: 0.01 IU/ML
TRANS ERYTHROCYTES VOL PATIENT: NORMAL ML
URN SPEC COLLECT METH UR: ABNORMAL
UROBILINOGEN UR STRIP-ACNC: NEGATIVE EU/DL
VARICELLA INTERPRETATION: POSITIVE
VARICELLA ZOSTER IGG: 1.42 ISR
WBC # BLD AUTO: 2.56 K/UL
WBC # BLD AUTO: 2.81 K/UL
WBC #/AREA URNS AUTO: 24 /HPF (ref 0–5)
YEAST UR QL AUTO: ABNORMAL

## 2018-08-16 PROCEDURE — A9585 GADOBUTROL INJECTION: HCPCS | Mod: NTX | Performed by: HOSPITALIST

## 2018-08-16 PROCEDURE — 36415 COLL VENOUS BLD VENIPUNCTURE: CPT | Mod: NTX

## 2018-08-16 PROCEDURE — 86682 HELMINTH ANTIBODY: CPT | Mod: 91,NTX

## 2018-08-16 PROCEDURE — 84100 ASSAY OF PHOSPHORUS: CPT | Mod: NTX

## 2018-08-16 PROCEDURE — 81001 URINALYSIS AUTO W/SCOPE: CPT | Mod: NTX

## 2018-08-16 PROCEDURE — 87077 CULTURE AEROBIC IDENTIFY: CPT | Mod: NTX

## 2018-08-16 PROCEDURE — 86665 EPSTEIN-BARR CAPSID VCA: CPT | Mod: NTX

## 2018-08-16 PROCEDURE — 99233 SBSQ HOSP IP/OBS HIGH 50: CPT | Mod: NTX,,, | Performed by: HOSPITALIST

## 2018-08-16 PROCEDURE — 25500020 PHARM REV CODE 255: Mod: NTX | Performed by: HOSPITALIST

## 2018-08-16 PROCEDURE — 87040 BLOOD CULTURE FOR BACTERIA: CPT | Mod: NTX

## 2018-08-16 PROCEDURE — 20600001 HC STEP DOWN PRIVATE ROOM: Mod: NTX

## 2018-08-16 PROCEDURE — 87517 HEPATITIS B DNA QUANT: CPT | Mod: NTX

## 2018-08-16 PROCEDURE — 83036 HEMOGLOBIN GLYCOSYLATED A1C: CPT | Mod: NTX

## 2018-08-16 PROCEDURE — 85610 PROTHROMBIN TIME: CPT | Mod: NTX

## 2018-08-16 PROCEDURE — P9021 RED BLOOD CELLS UNIT: HCPCS | Mod: NTX

## 2018-08-16 PROCEDURE — 86592 SYPHILIS TEST NON-TREP QUAL: CPT | Mod: NTX

## 2018-08-16 PROCEDURE — 25000003 PHARM REV CODE 250: Mod: NTX | Performed by: HOSPITALIST

## 2018-08-16 PROCEDURE — 86644 CMV ANTIBODY: CPT | Mod: NTX

## 2018-08-16 PROCEDURE — 85025 COMPLETE CBC W/AUTO DIFF WBC: CPT | Mod: NTX

## 2018-08-16 PROCEDURE — 87186 SC STD MICRODIL/AGAR DIL: CPT | Mod: 59,NTX

## 2018-08-16 PROCEDURE — 86905 BLOOD TYPING RBC ANTIGENS: CPT | Mod: NTX

## 2018-08-16 PROCEDURE — 85014 HEMATOCRIT: CPT | Mod: NTX

## 2018-08-16 PROCEDURE — 82272 OCCULT BLD FECES 1-3 TESTS: CPT | Mod: NTX

## 2018-08-16 PROCEDURE — 85018 HEMOGLOBIN: CPT | Mod: NTX

## 2018-08-16 PROCEDURE — 63600175 PHARM REV CODE 636 W HCPCS: Mod: JG,NTX | Performed by: HOSPITALIST

## 2018-08-16 PROCEDURE — 83735 ASSAY OF MAGNESIUM: CPT | Mod: NTX

## 2018-08-16 PROCEDURE — 87086 URINE CULTURE/COLONY COUNT: CPT | Mod: NTX

## 2018-08-16 PROCEDURE — 85384 FIBRINOGEN ACTIVITY: CPT | Mod: NTX

## 2018-08-16 PROCEDURE — 84145 PROCALCITONIN (PCT): CPT | Mod: NTX

## 2018-08-16 PROCEDURE — 83605 ASSAY OF LACTIC ACID: CPT | Mod: NTX

## 2018-08-16 PROCEDURE — 80053 COMPREHEN METABOLIC PANEL: CPT | Mod: NTX

## 2018-08-16 PROCEDURE — P9047 ALBUMIN (HUMAN), 25%, 50ML: HCPCS | Mod: JG,NTX | Performed by: HOSPITALIST

## 2018-08-16 PROCEDURE — 86787 VARICELLA-ZOSTER ANTIBODY: CPT | Mod: NTX

## 2018-08-16 PROCEDURE — 87088 URINE BACTERIA CULTURE: CPT | Mod: NTX

## 2018-08-16 PROCEDURE — 84300 ASSAY OF URINE SODIUM: CPT | Mod: NTX

## 2018-08-16 PROCEDURE — 86901 BLOOD TYPING SEROLOGIC RH(D): CPT | Mod: NTX

## 2018-08-16 PROCEDURE — 86682 HELMINTH ANTIBODY: CPT | Mod: NTX

## 2018-08-16 PROCEDURE — 86920 COMPATIBILITY TEST SPIN: CPT | Mod: NTX

## 2018-08-16 RX ORDER — INSULIN ASPART 100 [IU]/ML
INJECTION, SOLUTION INTRAVENOUS; SUBCUTANEOUS
Status: ON HOLD | COMMUNITY
End: 2018-08-24 | Stop reason: SDUPTHER

## 2018-08-16 RX ORDER — MIDODRINE HYDROCHLORIDE 5 MG/1
10 TABLET ORAL 3 TIMES DAILY
Status: DISCONTINUED | OUTPATIENT
Start: 2018-08-16 | End: 2018-08-20

## 2018-08-16 RX ORDER — MIDODRINE HYDROCHLORIDE 5 MG/1
5 TABLET ORAL ONCE
Status: DISCONTINUED | OUTPATIENT
Start: 2018-08-16 | End: 2018-08-16

## 2018-08-16 RX ORDER — GADOBUTROL 604.72 MG/ML
10 INJECTION INTRAVENOUS
Status: COMPLETED | OUTPATIENT
Start: 2018-08-16 | End: 2018-08-16

## 2018-08-16 RX ORDER — HYDROCODONE BITARTRATE AND ACETAMINOPHEN 500; 5 MG/1; MG/1
TABLET ORAL
Status: DISCONTINUED | OUTPATIENT
Start: 2018-08-16 | End: 2018-08-21

## 2018-08-16 RX ORDER — ENTECAVIR 0.5 MG/1
0.5 TABLET, FILM COATED ORAL EVERY OTHER DAY
Status: DISCONTINUED | OUTPATIENT
Start: 2018-08-16 | End: 2018-08-18

## 2018-08-16 RX ORDER — POTASSIUM CHLORIDE 20 MEQ/1
40 TABLET, EXTENDED RELEASE ORAL ONCE
Status: COMPLETED | OUTPATIENT
Start: 2018-08-16 | End: 2018-08-16

## 2018-08-16 RX ADMIN — INSULIN ASPART 1 UNITS: 100 INJECTION, SOLUTION INTRAVENOUS; SUBCUTANEOUS at 09:08

## 2018-08-16 RX ADMIN — SODIUM BICARBONATE 650 MG TABLET 1300 MG: at 09:08

## 2018-08-16 RX ADMIN — ALBUMIN HUMAN 25 G: 0.25 SOLUTION INTRAVENOUS at 06:08

## 2018-08-16 RX ADMIN — LACTULOSE 30 G: 20 SOLUTION ORAL at 05:08

## 2018-08-16 RX ADMIN — GADOBUTROL 10 ML: 604.72 INJECTION INTRAVENOUS at 04:08

## 2018-08-16 RX ADMIN — ALBUMIN HUMAN 25 G: 0.25 SOLUTION INTRAVENOUS at 05:08

## 2018-08-16 RX ADMIN — POTASSIUM CHLORIDE 40 MEQ: 1500 TABLET, EXTENDED RELEASE ORAL at 08:08

## 2018-08-16 RX ADMIN — SODIUM BICARBONATE 650 MG TABLET 1300 MG: at 05:08

## 2018-08-16 RX ADMIN — INSULIN ASPART 2 UNITS: 100 INJECTION, SOLUTION INTRAVENOUS; SUBCUTANEOUS at 12:08

## 2018-08-16 RX ADMIN — MIDODRINE HYDROCHLORIDE 10 MG: 5 TABLET ORAL at 09:08

## 2018-08-16 RX ADMIN — MIDODRINE HYDROCHLORIDE 10 MG: 5 TABLET ORAL at 05:08

## 2018-08-16 RX ADMIN — SODIUM BICARBONATE 650 MG TABLET 1300 MG: at 08:08

## 2018-08-16 RX ADMIN — ENTECAVIR 0.5 MG: 0.5 TABLET, FILM COATED ORAL at 08:08

## 2018-08-16 RX ADMIN — MIDODRINE HYDROCHLORIDE 10 MG: 5 TABLET ORAL at 08:08

## 2018-08-16 RX ADMIN — INSULIN ASPART 12 UNITS: 100 INJECTION, SOLUTION INTRAVENOUS; SUBCUTANEOUS at 05:08

## 2018-08-16 NOTE — PLAN OF CARE
Problem: Patient Care Overview  Goal: Plan of Care Review  Outcome: Ongoing (interventions implemented as appropriate)  POC reviewed. Pt AAOx4, VSS. CBG monitored AC/HS, insulin administered per order.  @ bedside during shift to help answer all questions. Patient transported to  of [facility], tolerated well, awaiting results. Para today, 2.9 L removed. No evidence of distress, safety maintained. Hourly rounding performed. Will continue to monitor.

## 2018-08-16 NOTE — PROGRESS NOTES
Received order for inpatient DSE & pt scheduled for this afternoon.  Upon review of pt chart, pt having issues with hypotension & drop in her H/H.  Reviewed findings w/ Dr. Jones, he recommends that these issues be corrected & evaluated and that pt needs to be more stable prior to DSE testing.  I notified Susu, pts nurse, & Dr. Feliz of Dr. Jones's recommendations.  DSE cancelled until further notice.

## 2018-08-16 NOTE — PROGRESS NOTES
Hospital Medicine  Progress note    Team: Networked reference to record PCT  Cristobal Rodrigues MD  Admit Date: 8/14/2018  AJIT 8/21/2018  Code status: Full Code    Principal Problem:  Hepatic encephalopathy    Interval hx:      ROS   Review of Systems   Constitutional: Negative for chills and fever.   HENT: Negative for ear discharge.    Eyes: Negative for blurred vision and double vision.   Cardiovascular: Negative for chest pain.   Gastrointestinal: Negative for nausea and vomiting.        Abdominal distention    Genitourinary: Negative for dysuria and urgency.   Musculoskeletal: Negative.    Skin: Negative for rash.   Neurological: Negative for tremors, sensory change and speech change.   Psychiatric/Behavioral: Negative.          PEx  Temp:  [97.5 °F (36.4 °C)-98.5 °F (36.9 °C)]   Pulse:  []   Resp:  [16-20]   BP: ()/(43-58)   SpO2:  [98 %-100 %]     Intake/Output Summary (Last 24 hours) at 8/16/2018 1442  Last data filed at 8/16/2018 1003  Gross per 24 hour   Intake 640.42 ml   Output 100 ml   Net 540.42 ml     Physical Exam   Constitutional: She is oriented to person, place, and time and well-developed, well-nourished, and in no distress.   HENT:   Head: Normocephalic and atraumatic.   Eyes: Pupils are equal, round, and reactive to light.   Neck: Normal range of motion. Neck supple. No thyromegaly present.   Cardiovascular: Normal rate, regular rhythm and normal heart sounds.   Pulmonary/Chest: Effort normal and breath sounds normal.   Abdominal: Soft. Bowel sounds are normal. She exhibits distension. There is no tenderness. There is no guarding.   Musculoskeletal: Normal range of motion.   Neurological: She is alert and oriented to person, place, and time.   Skin: Skin is warm and dry. No rash noted. No erythema.     Recent Labs   Lab  08/14/18   1149  08/15/18   0622  08/16/18   0214  08/16/18   1015   WBC  4.19  3.38*  2.81*   --    HGB  9.7*  8.7*  6.8*  8.5*   HCT  28.2*  24.8*  20.5*  26.0*    PLT  85*  65*  58*   --      Recent Labs   Lab  08/14/18   1149   08/14/18   2345  08/15/18   0622  08/16/18   0214   NA  124*   < >  128*  130*  129*   K  3.5   < >  2.8*  3.3*  3.6   CL  100   < >  105  107  105   CO2  12*   < >  12*  12*  13*   BUN  69*   < >  67*  66*  62*   CREATININE  1.4   < >  1.2  1.4  1.4   GLU  289*   < >  281*  258*  271*   CALCIUM  9.5   < >  9.1  9.3  8.8   MG  2.7*   --    --   2.5  2.4   PHOS   --    --    --   4.0  2.9   LIPASE  196*   --    --    --    --     < > = values in this interval not displayed.     Recent Labs   Lab  08/14/18   1149  08/15/18   0622  08/16/18   0214   ALKPHOS  658*  544*  443*   ALT  160*  131*  122*   AST  156*  139*  149*   ALBUMIN  4.0  3.7  4.0   PROT  6.6  5.7*  5.6*   BILITOT  2.1*  1.9*  1.8*   INR  1.0  1.1  1.2      Recent Labs   Lab  08/15/18   1127  08/15/18   1532  08/15/18   1625  08/15/18   2356  08/16/18   0751  08/16/18   1229   POCTGLUCOSE  300*  335*  280*  339*  157*  159*     Recent Labs      08/14/18   1149   TROPONINI  0.017       Scheduled Meds:   albumin human 25%  25 g Intravenous Q8H    entecavir  0.5 mg Oral Every other day    insulin aspart U-100  12 Units Subcutaneous TIDWM    insulin detemir U-100  20 Units Subcutaneous QHS    lactulose  30 g Oral TID    midodrine  10 mg Oral TID    sodium bicarbonate  1,300 mg Oral TID     Continuous Infusions:  As Needed:  sodium chloride, dextrose 50%, dextrose 50%, glucagon (human recombinant), glucose, glucose, insulin aspart U-100    Active Hospital Problems    Diagnosis  POA    *Hepatic encephalopathy [K72.90]  Yes     Priority: 1 - High    Severe malnutrition [E43]  Unknown    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 [B18.0, K74.60]  Yes    Type 2 diabetes mellitus, with long-term current use of insulin [E11.9, Z79.4]  Not Applicable    Esophageal varices without bleeding [I85.00]  Yes    Liver transplant candidate [Z76.82]  Not Applicable      Resolved  Hospital Problems   No resolved problems to display.       Overview    69 yo F with with above mentioned medical history presents with confusion and decompensated liver cirrhosis, admitted for expedited liver transplant evaluation    Assessment and Plan for Problems addressed today:    Hepatic Encephalopathy  - ammonia levels high at 67  - Started on PO Lactulose, one time rectal lactulose given in the ER  - AAO X 3     Decompensated Liver cirrhosis due to Hep B And D  MELD-Na score: 21 at 8/16/2018  2:14 AM  MELD score: 14 at 8/16/2018  2:14 AM  Calculated from:  Serum Creatinine: 1.4 mg/dL at 8/16/2018  2:14 AM  Serum Sodium: 129 mmol/L at 8/16/2018  2:14 AM  Total Bilirubin: 1.8 mg/dL at 8/16/2018  2:14 AM  INR(ratio): 1.2 at 8/16/2018  2:14 AM  Age: 68 years  - Hold beta blockers due to hypotension  - IR paracentesis negative for SBP, d/c ceftriaxone   - UA/Urine culture  - blood cultures NGTD  - Fluid restriction  - US liver doppler  - Hepatology consult  - AFP 2.9  - Lactic acid 2.5 on admission, now 2.0  - PETH pending  - EGD tomorrow given history of EV and new onset anemia    Anemia requiring blood transfusions  - Hgb 6.8 in am labs  - transfused one unit of PRBCs overnight  - CBC q 8 hrs, transfuse to above 7.0     Metabolic acidosis  - VBG: metabolic acidosis with respiratory alkalosis, normal pH  - start sodium bicarbonate tablets      Diabetes type 2  - POCT q ACHS  - low dose sliding scale   - HgbA1c 6.8  - cont insulin at night, 20 units, 10 units TID      Hyponatremia  - most likely hypervolemic  - fluid restriction  - Urine sodium  - 124-- 131--128-->130     Portal Vein thrombosis  - US liver doppler: no evidence of thrombus   - on Lovenox SQ daily, holding given new onset anemia       Diet:  Low sodium  DVT PPx:    OLAF/SCD  Goals of Care: Full code    Discharge plan and follow up  Pending resolution of bleeding and liver transplant workup    Provider    Cristobal Melendez MD  Staff  Hospitalist  Department of Hospital Medicine  [facility]   [phone]

## 2018-08-16 NOTE — SIGNIFICANT EVENT
Hg 6.8 this AM.  Will d/c enoxaparin, order type &screen and FOBT and consent for blood.    Patient denies SOB, palpitations, dizziness.  Pt denies hematemesis, black stools, rectal bleeding.

## 2018-08-16 NOTE — PROGRESS NOTES
Transplant Recipient Adult Psychosocial Assessment    Completed inpt with patient and caregivers     Scarlett Reddy  [address] Pt residing locally in  Room 582    Telephone Information:   Mobile [phone]   Home  [phone] (home)  Work  There is no work phone number on file.  E-mail  No e-mail address on record    Sex: female  YOB: 1950  Age: 68 y.o.    Encounter Date: 8/14/2018  U[place] Citizen: no Pt reported she is here on a visa for transplant only   Primary Language: Japanese, Pt reported she also speaks fluent in Dominican and Perisan     Needed: yes Symiona ext [phone] International Department     Emergency Contact:  Name: Justin Ramirez   Relationship: son  Address: [facility]   Phone Numbers:  n/a (home), n/a (work), [phone] (mobile)    Family/Social Support:   Number of dependents/: None. The patient reported that she has four children.  Pt reported all her children are adult and do not require assistance from her.  Pt reported she has three daughters Terri, Angela, Gabriela and one son Justin (who is present for the assessment)   Marital history: Pt reported she and her  have been  for 43 yrs. Pt  Andrés Ramirez is currently present for assessment as well.   Other family dynamics: The pt reported that she has a close family in [place]. Pt reported her three daughters have all agreed and want to come assist but she has questions on how to get them here as the contract and or insurance only covers for one person to be present.  SW discussed the caregiver policy and guidelines.  Pt and son Justin will meet with Katerin to further discuss bringing other family members over for assistance.  Pt denied having any pets     Household Composition:  Name: Scarlett Reddy  Age: 68 y.o.  Relationship: patient  Does person drive? no    Name: Andrés Ramirez  Age: 76 y.o.  Relationship: spouse  Does person drive? yes    Do you and your caregivers have  access to reliable transportation? no Pt and caregiver reported if they need to go somewhere they will use public transportation  PRIMARY CAREGIVER: Andrés Ramirez will be primary caregiver, phone number [phone].      provided in-depth information to patient and caregiver regarding pre- and post-transplant caregiver role.   strongly encourages patient and caregiver to have concrete plan regarding post-transplant care giving, including back-up caregiver(s) to ensure care giving needs are met as needed.    Patient and Caregiver states understanding all aspects of caregiver role/commitment and is able/willing/committed to being caregiver to the fullest extent necessary.    Patient and Caregiver verbalizes understanding of the education provided today and caregiver responsibilities.         remains available. Patient and Caregiver agree to contact  in a timely manner if concerns arise.      Able to take time off work without financial concerns: yes the  of the patient reported he is retired and has been caring for the patient since she has been sick.     Additional Significant Others who will Assist with Transplant:  Name: Justin Ramirez  Age: 36 y.o.  Relationship: son  Does person drive? yes    Name: Rip Murray  Relationship: daughter  Does person drive? yes    Living Will: no  Healthcare Power of : no  Advance Directives on file: <<no information> per medical record.  Verbally reviewed LW/HCPA information.   provided patient with copy of LW/HCPA documents and provided education on completion of forms.    Living Donors: No.    Highest Education Level: Associate/Bachelor Degree  Reading Ability: college  Reports difficulty with: physical strength at this time the patient and caregivers report weakness  Learns Best By:  In Kazakh hands on and verbal communication     Status: no  VA Benefits: no     Working for Income:  No  If no, reason not working: Patient Choice - Retired  Patient is retired from school teaching..    Spouse/Significant Other Employment: Retired     Disabled: no    Monthly Income:  Salary/Wages: $unknown the patient reported her daughters and son handle their finances  Able to afford all costs now and if transplanted, including medications: yes  Patient and Caregiver verbalizes understanding of personal responsibilities related to transplant costs and the importance of having a financial plan to ensure that patients transplant costs are fully covered.      provided fundraising information/education.  Patient and Caregiver verbalizes understanding.   remains available.    Insurance:   Payor/Plan Subscr  Sex Relation Sub. Ins. ID Effective Group Num   1. LIFETRAC NETW* MERCEDES PATHAK 1950 Female  [id number] 8/3/18                                    [id number] [facility] [address]     Primary Insurance (for UNOS reporting): Private Insurance Diane Rodríguez   Secondary Insurance (for UNOS reporting): None  Patient and Caregiver verbalizes clear understanding that patient may experience difficulty obtaining and/or be denied insurance coverage post-surgery. This includes and is not limited to disability insurance, life insurance, health insurance, burial insurance, long term care insurance, and other insurances.    Patient and Caregiver also reports understanding that future health concerns related to or unrelated to transplantation may not be covered by patient's insurance.  Resources and information provided and reviewed.      Patient and Caregiver provides verbal permission to release any necessary information to outside resources for patient care and discharge planning.  Resources and information provided are reviewed.      Infusion Service: patient utilizing? no  Home Health: patient utilizing? no  DME: yes RW and will borrow WC from the International Department.  Pulmonary/Cardiac Rehab:  NONE   ADLS:  Pt is independent but gets very weak and has been using her  and nurse to care for her    Adherence:   Pt and caregiver reported very high adherence.  Adherence education and counseling provided.     Per History Section:  Past Medical History:   Diagnosis Date    Ascites     Cirrhosis     Esophageal varices     Hepatic encephalopathy     Hepatitis B     Hepatitis D virus infection     Hypersplenism     Portal hypertension      Social History     Tobacco Use    Smoking status: Unknown If Ever Smoked   Substance Use Topics    Alcohol use: Not on file     Social History     Substance and Sexual Activity   Drug Use Not on file     Social History     Substance and Sexual Activity   Sexual Activity Not on file       Per Today's Psychosocial:  Tobacco: none, patient denies any use.  Alcohol: none, patient denies any use.  Illicit Drugs/Non-prescribed Medications: none, patient denies any use.    Patient and Caregiver states clear understanding of the potential impact of substance use as it relates to transplant candidacy and is aware of possible random substance screening.  Substance abstinence/cessation counseling, education and resources provided and reviewed.     Arrests/DWI/Treatment/Rehab: patient denies    Psychiatric History:    Mental Health: pt denied any diagnosis  Psychiatrist/Counselor: NONE  Medications:  NONE  Suicide/Homicide Issues: Pt denied current and past S.I./H.I.    Safety at home: Pt reported she feels comfortable and free from abuse including but not limited to verbal, physical, sexual and emotional.     Knowledge: Patient and Caregiver states having clear understanding and realistic expectations regarding the potential risks and potential benefits of organ transplantation and organ donation, agrees to discuss with health care team members and support system members and to utilize available resources and express questions and/or concerns in order to further facilitate  the pt informed decision-making.  Resources and information provided and reviewed.     Patient and Caregiver is aware of [facility] affiliation and/or partnership with agencies in home health care, LTAC, SNF, [facility] and other hospitals and clinics.    Understanding: Patient and Caregiver reports having a clear understanding of the many lifetime commitments involved with being a transplant recipient, including costs, compliance, medications, lab work, procedures, appointments, concrete and financial planning, preparedness, timely and appropriate communication of concerns, abstinence (ETOH, tobacco, illicit non-prescribed drugs), adherence to all health care team recommendations, support system and caregiver involvement, appropriate and timely resource utilization and follow-through, mental health counseling as needed/recommended, and patient and caregiver responsibilities.  Social Service Handbook, resources and detailed educational information provided and reviewed.  Educational information provided.    Patient and Caregiver also reports current and expected compliance with health care regime and states having a clear understanding of the importance of compliance.      Patient and Caregiver reports a clear understanding that risks and benefits may be involved with organ transplantation and with organ donation.      Patient and Caregiver also reports clear understanding that psychosocial risk factors may affect patient, and include but are not limited to feelings of depression, generalized anxiety, anxiety regarding dependence on others, post traumatic stress disorder, feelings of guilt and other emotional and/or mental concerns, and/or exacerbation of existing mental health concerns.  Detailed resources provided and discussed.     Patient and Caregiver agrees to access appropriate resources in a timely manner as needed and/or as recommended, and to communicate concerns appropriately.  Patient and Caregiver also reports  a clear understanding of treatment options available.      reviewed education, provided additional information, and answered questions.    Feelings or Concerns: Pt and caregiver had several questions about the Hep C donation and acceptance of this offers. Pt and caregiver reported they have a lot of concerns some which were not properly addressed.  The pt is scheduled to meet with the doctor on today for further discussion.  Justin the son of the patient also voice concerns about the language barrier and when he returns home in a few weeks.      Justin asked if they needed anything who would be able to assist them with any questions or concerns due to the language barrier.  SW addressed providing them the contact information for the International Department and SW arranged for Manager Katerin to present to meet with them to discuss these concerns as well.     Coping: Pt reported she is coping well just a little weak     Goals: Pt would like to go home and be with family and continue to live in halfway with her .     Interview Behavior: Patient and Caregiver presents as alert and oriented x 4, pleasant, good eye contact, well groomed, calm, communicative, cooperative and asking and answering questions appropriately. During the assessment the pts  and primary caregiver was noted to be holding her hand and providing assurance throughout the assessment.  The pt was also seen comforting her  when he voiced concerns about the language barrier and how will they be cared for when Justin leaves.  Justin son of the patient appears to be very involved in the pts care.           Transplant Social Work - Candidacy  Assessment/Plan:     Psychosocial Suitability: Patient presents as an acceptable candidate for liver transplant at this time. Based on psychosocial risk factors, patient presents as medium risk, due to patient reports of adequate caregiver/transportation plan; however, patients  will  be the only one staying here with her locally.  The pts children plan to fly in and out but not until post transplant. Pts  has been providing care for her and has been doing very well per report.  TIMBO concerns that in the event the back up must present there will be a break in social support due to the pt being from [place] it may take a few weeks for the back up caregiver to present. Patient reports financial stability and reports medium functional status. No reported mental health diagnosis No reported substance use or abuse.  Pt verbalized Insight on diagnosis and motivated for transplant.     Recommendations/Additional Comments: Local Lodging, Meeting with Katerin YUNG To discuss Back up caregiver plan and discuss with SW and Fund raising.     Lakisha Landa

## 2018-08-16 NOTE — NURSING
0205: Pt found hypotensive 70s/40s. VASU Desai notified and to bedside for assessment. Pt and spouse refused midodrine at this time. Pt BP increased to 90s/50s independently. Awaiting lab draws and follow-up. Will continue to monitor closely.    0350: Pt's hgb <7. GREGORIO Desai aware. At bedside utilizing  line. Blood consent signed and witnessed. Type and screen obtained. Will hang unit of PRBC when type and screen results.

## 2018-08-16 NOTE — MEDICAL/APP STUDENT
[facility] Hospital Medicine  Progress Note    Patient Name: Scarlett Reddy  MRN: [id number]  Patient Class: IP- Inpatient   Admission Date: 2018  Length of Stay: 2 days  Attending Physician: Cristobal Rodrigues MD  Primary Care Provider: Primary Doctor [facility] Medicine Team: Networked reference to record PCT  Larry Garcia    Subjective:     Principal Problem:Hepatic encephalopathy    HPI:68 year old female with a past medical history of Cirrhosis, hepatitis B and D (from blood transfusion due to an ), diabetes and portal hypertension presents to ED having been escorted by a doctor on a flight from [facility] after her family noticed a lack of communication. According to the doctor who escorted her, she had elevated NH3 levels (67). Over the last few hours, the patient became more drowsy and weak. The patient was cooperative, but required an  for the verbal answers. Over the last few months, the patient has had portal vein thrombosis on lovenox, hyponatremia, ascites, hepatic encephalopathy, esophageal varices with bleeding. She receives paracentesis every 7-10 days. Her appointment to get an ultrasound of the abdomen was cancelled. She is scheduled to have paracentesis today. Her most recent blood pressure was 89/51, so she was given midodrine.      The patient has a history of hepatic encephalopathy as a result of hepatitis B and D. She also had an incidence of bilirubin being elevated 3 weeks ago at 4.4, but it eventually improved. She is scheduled to have Ultrasound of the Abdomen and IR paracentesis.         Hospital Course: See HPI    Interval History: Patient was interrupted at night, so did not sleep well. She is able to ambulate, urinate, and pass stools without any difficulty. Her appetite is normal.     Review of Systems   Constitutional: Negative for activity change, appetite change, chills and fever.   Eyes: Negative for photophobia.   Respiratory: Negative for  shortness of breath and stridor.    Cardiovascular: Negative for chest pain, palpitations and leg swelling.   Gastrointestinal: Positive for abdominal distention. Negative for abdominal pain, constipation, diarrhea, nausea and vomiting.   Neurological: Positive for headaches. Negative for dizziness.   Hematological: Bruises/bleeds easily.   Psychiatric/Behavioral: Negative.  The patient is not nervous/anxious.      Objective:     Vital Signs (Most Recent):  Temp: 97.7 °F (36.5 °C) (08/16/18 0650)  Pulse: 89 (08/16/18 0711)  Resp: 18 (08/16/18 0650)  BP: (!) 89/51 (08/16/18 0650)  SpO2: 100 % (08/16/18 0650) Vital Signs (24h Range):  Temp:  [97.5 °F (36.4 °C)-98.3 °F (36.8 °C)] 97.7 °F (36.5 °C)  Pulse:  [] 89  Resp:  [16-18] 18  SpO2:  [98 %-100 %] 100 %  BP: ()/(43-58) 89/51     Weight: 51.4 kg (113 lb 6.4 oz)  Body mass index is 18.87 kg/m².    Intake/Output Summary (Last 24 hours) at 8/16/2018 0817  Last data filed at 8/16/2018 0608  Gross per 24 hour   Intake 350 ml   Output 100 ml   Net 250 ml      Physical Exam   Constitutional: She appears well-developed.   HENT:   Head: Normocephalic and atraumatic.   Eyes: Conjunctivae and EOM are normal. Pupils are equal, round, and reactive to light.   Neck: Normal range of motion.   Cardiovascular: Normal rate, regular rhythm, normal heart sounds and intact distal pulses.   Pulmonary/Chest: Effort normal and breath sounds normal.   Abdominal: Soft. Bowel sounds are normal. She exhibits distension and ascites.       Significant Labs:   A1C:   Recent Labs   Lab  08/16/18   0214   HGBA1C  6.8*     Bilirubin:   Recent Labs   Lab  08/14/18   1149  08/15/18   0622  08/16/18   0214   BILITOT  2.1*  1.9*  1.8*     BMP:   Recent Labs   Lab  08/16/18 0214   GLU  271*   NA  129*   K  3.6   CL  105   CO2  13*   BUN  62*   CREATININE  1.4   CALCIUM  8.8   MG  2.4     CBC:   Recent Labs   Lab  08/14/18   1149  08/15/18   0622  08/16/18   0214   WBC  4.19  3.38*  2.81*    HGB  9.7*  8.7*  6.8*   HCT  28.2*  24.8*  20.5*   PLT  85*  65*  58*     CMP:   Recent Labs   Lab  08/14/18   1149   08/14/18   2345  08/15/18   0622  08/16/18   0214   NA  124*   < >  128*  130*  129*   K  3.5   < >  2.8*  3.3*  3.6   CL  100   < >  105  107  105   CO2  12*   < >  12*  12*  13*   GLU  289*   < >  281*  258*  271*   BUN  69*   < >  67*  66*  62*   CREATININE  1.4   < >  1.2  1.4  1.4   CALCIUM  9.5   < >  9.1  9.3  8.8   PROT  6.6   --    --   5.7*  5.6*   ALBUMIN  4.0   --    --   3.7  4.0   BILITOT  2.1*   --    --   1.9*  1.8*   ALKPHOS  658*   --    --   544*  443*   AST  156*   --    --   139*  149*   ALT  160*   --    --   131*  122*   ANIONGAP  12   < >  11  11  11   EGFRNONAA  38.4*   < >  46.2*  38.4*  38.4*    < > = values in this interval not displayed.     Lactic Acid:   Recent Labs   Lab  08/14/18   1149  08/15/18   0622  08/16/18   0214   LACTATE  2.5*  2.5*  2.0     POCT Glucose:   Recent Labs   Lab  08/15/18   1625  08/15/18   2356  08/16/18   0751   POCTGLUCOSE  280*  339*  157*       Significant Imaging: No major imaging in the last 24 hours.     Assessment/Plan:      Active Diagnoses:    Diagnosis Date Noted POA    PRINCIPAL PROBLEM:  Hepatic encephalopathy [K72.90] 08/14/2018 Yes    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 [B18.0, K74.60] 08/15/2018 Yes    Type 2 diabetes mellitus, with long-term current use of insulin [E11.9, Z79.4] 08/15/2018 Not Applicable    Esophageal varices without bleeding [I85.00] 08/15/2018 Yes    Liver transplant candidate [Z76.82]  Not Applicable      Problems Resolved During this Admission:     VTE Risk Mitigation (From admission, onward)    None         Main Concern- Hepatic Encephalopathy  - Hepatic Encephalopathy    * Patient was given a lactulose enema (traps ammonia)- traps ammonia in colon and uses gut james to acidify colon, transforming ammonia into ammonium, which cannot diffuse into blood.      * The patient has been  improving as far as mentation was concerned. She is able to follow basic commands.          Cirrhosis/Ascities/Hepatitis B and D- Patient has history of Cirrhosis as a complication of hepatitis B and D. She was infected by Hepatitis B due to blood transfusion due to an . Hepatitis D can only appear as a result of the presence of hepatitis B. She is undergoing treatment with Entecavir (a nucleoside analog that reduces ability of HBV in the blood by reducing ability to multiply and infect new cells) for hepatitis B. Due to hepatitis, there is loss of ability to to synthesize albumin, so there is loss of oncotic pressure to pull water into the circulatory system. Therefore, ascites results.  The patient is undergoing paracentesis to remove fluid. Also, it is important to make sure there is no infection in the fluid. The patient also was HBsAg positive (confirming the presence of infection), and Hep B Core Total antibodies results being positive.     - Note, the patient's MELD score is 21 (creatinine-1.4, bilirubin-1.8, INR-1.2, sodium- 128). Patient has an estimated 3 month mortality of 19.6%.      Esophageal varices without bleeding  - Beta blockers- Patient was on propanolol, which is a nonselective beta blocker. This is for primary prophylaxis for varices. The nonselective beta blocker acts on B1 leading to decreased cardiac output, and B2, leading to splanchnic vasoconstriction.     - Patient was hypotensive, so was given midodrine. The patient may have a GI bleed leading to encephalopathy. Patient is NPO, so let's do a scope. The patient also was given a blood transfusion due to the combination of decreasing H&H levels and hypotension. The patient is somewhat hemodynamically unstable.      Diabetes (Type 2)     -Keep on diabetic diet.   - HbA1c is at 6.8. A normal target range for her would be 6.5->7. This is essential to reduce microvascular complications.   - Patient is on insulin aspart U-100 pen 12  units subcutaneously (starting on 8/16). Also, the patient is on insulin detemir U-100 pen 20 units starting on 8/15.   - POCT glucose high at 258.           DVT PPx  - Important because patient can develop stasis by remaining in the same position.   - Make sure patient is in different positions.   - Enoxaparin- LMWH; subcutaneous injection.         Larry Garcia  Department of Hospital Medicine   [facility]

## 2018-08-16 NOTE — PLAN OF CARE
Problem: Patient Care Overview  Goal: Plan of Care Review  Stress test for this morning cancelled due to anemia and low BP. POC discussed with family with  at bedside. Planning for EGD tomorrow to evaluate for blood loss. ACHS glucose done. Scheduled insulin held due to NPO status this morning and lunch. Family concerned about nutrition and weight loss. Liquid diet started for this evening- NPO after MN for procedure. No complaints today. Bed rails up X3, call light within reach. Bed in  Low, locked position. Instructed to call for assist. Will continue to monitor.

## 2018-08-16 NOTE — NURSING
PRE EDUCATION TEACHING NOTE    Scarlett Reddy was seen in the hospital today.  Handbook on pre-liver transplant information (see outline below) was given to the patient and time was allowed for questions.  Patient's  and son were at her bedside.   .  Informed consent signed and written information given on selection criteria. This session was interpreted by Simona, [facility] .       LIVER TRANSPLANT WORK-UP EDUCATION  I. NATIONAL REGISTRY LISTING  A. Information for listing  B. Regions  C. Per UNOS, can be listed at more than one center  II. SURGERY  A. Length  B. Complications: bleeding, infection  C. Central lines, drains, Bower catheter, incision, endotracheal tube, NG tube  D. Transfusions, cell saver  III. SHORT TERM RECOVERY  A. ICU, PICU, Hospital stay  IV. LONG TERM RECOVERY  A. Labs at home  B. Clinic visits  C. Complications: infection, rejection, readmissions  D. Normal immunity and immunosuppression  E. Incidence of re-admit in 1st year  V. REJECTION  A. Incidence  B. Treatment: Solumedrol, Prograf, Thymoglobulin (actions and side effects)  VI. IMMUNOSUPPRESSIVES  A. Prednisone  B. Imuran/Cellcept  C. Cyclosporin/Prograf  D. Rapamune  E. Need for lifetime compliance  F. Actions and side effects  G. Costs  VII. RECURRENCE OF VIRAL HEPATITIS

## 2018-08-16 NOTE — CONSULTS
"  [facility]  Adult Nutrition  Consult Note    SUMMARY     Recommendations    1. When medically feasible, ADAT to 2 gram sodium (fluid restriction per MD).  2. Add Boost Glucose Control ONS to aid in caloric intake.   3. RD to monitor & follow-up.    Goals: PO intake >50%  Nutrition Goal Status: new  Communication of RD Recs: reviewed with RN    Reason for Assessment    Reason for Assessment: consult  Diagnosis: other (see comments)(Hepatic encephalopathy)  Relevant Medical History: Cirrhosis  Interdisciplinary Rounds: did not attend    General Information Comments: Spoke with patient and family through interpretor. At time of visit, pt was NPO & requesting food. Diet recently advanced to full liquid. Prior to NPO status, pt w/ fair PO intake, consuming 50% of meals. Follows low sodium diet at home. Wt loss PTA & poor appetite PTA 2/2 early satiety. Pt's family bringing Glucerna ONS from home but requesting Boost Glucose Control to see "if she likes that better." NFPE complete.  Nutrition Discharge Planning: Adequate PO intake    Nutrition/Diet History    Patient Reported Diet/Restrictions/Preferences: low salt  Do you have any cultural, spiritual, Hoahaoism conflicts, given your current situation?: Zoroastrianism  Factors Affecting Nutritional Intake: altered gastrointestinal function, decreased appetite, other (see comments)(Full liquid diet)    Anthropometrics    Temp: 98.5 °F (36.9 °C)  Height Method: Stated  Height: 5' 5" (165.1 cm)  Height (inches): 65 in  Weight Method: Bed Scale  Weight: 51.4 kg (113 lb 5.1 oz)  Weight (lb): 113.32 lb  Ideal Body Weight (IBW), Female: 125 lb  % Ideal Body Weight, Female (lb): 90.66 lb  BMI (Calculated): 18.9  BMI Grade: 18.5-24.9 - normal  Usual Body Weight (UBW), k kg  % Usual Body Weight: 81.76  % Weight Change From Usual Weight: -18.41 %    Lab/Procedures/Meds    Pertinent Labs Reviewed: reviewed  Pertinent Labs Comments: Na 129, BUN 62, GFR 38.4, Gluc " 271, A1C 6.8  Pertinent Medications Reviewed: reviewed    Physical Findings/Assessment    Overall Physical Appearance: loss of muscle mass, loss of subcutaneous fat, underweight  Oral/Mouth Cavity: WDL  Skin: incision(s)    Estimated/Assessed Needs    Weight Used For Calorie Calculations: 51.4 kg (113 lb 5.1 oz)     Energy Calorie Requirements (kcal): 1352 kcal/d  Energy Need Method: [place]-St Jeor(1.3 PAL)     Protein Requirements: 67 g/d (1.3 g/kg)  Weight Used For Protein Calculations: 51.4 kg (113 lb 5.1 oz)     Fluid Need Method: other (see comments)(Per MD or 1 mL/kcal)    Nutrition Prescription Ordered    Current Diet Order: Full liquid  Nutrition Order Comments: Was on low sodium diet w/ 50% PO intake.  Oral Nutrition Supplement: Glucerna ONS (from home)    Evaluation of Received Nutrient/Fluid Intake    Comments: LBM: 8/16    Tolerance: tolerating    Nutrition Risk    Level of Risk/Frequency of Follow-up: (1x/week)     Assessment and Plan    Severe malnutrition      Malnutrition in the context of Chronic Illness/Injury    Related to (etiology):  Liver fx, liver transplant work-up    Signs and Symptoms (as evidenced by):  Energy Intake: <50% of estimated energy requirement for 6 months  Body Fat Depletion: severe depletion of orbitals and triceps   Muscle Mass Depletion: severe depletion of temples, clavicle region and lower extremities   Weight Loss: 18% x 6 months    Nutrition Diagnosis Status:  New         Monitor and Evaluation    Food and Nutrient Intake: energy intake, food and beverage intake  Food and Nutrient Adminstration: diet order  Physical Activity and Function: nutrition-related ADLs and IADLs  Anthropometric Measurements: weight, weight change  Biochemical Data, Medical Tests and Procedures: lipid profile, gastrointestinal profile, electrolyte and renal panel, glucose/endocrine profile, inflammatory profile  Nutrition-Focused Physical Findings: overall appearance     Nutrition  Follow-Up    RD Follow-up?: Yes

## 2018-08-16 NOTE — PROGRESS NOTES
Pt arrived to MRI via stretcher on continuous tele,  with pt to explain instructions for MRI, tele room notified that pt will be off portable tele & placed on MRI compatible monitor, will place pt back on portable tele post MRI

## 2018-08-16 NOTE — ASSESSMENT & PLAN NOTE
Malnutrition in the context of Chronic Illness/Injury    Related to (etiology):  Liver fx, liver transplant work-up    Signs and Symptoms (as evidenced by):  Energy Intake: <50% of estimated energy requirement for 6 months  Body Fat Depletion: severe depletion of orbitals and triceps   Muscle Mass Depletion: severe depletion of temples, clavicle region and lower extremities   Weight Loss: 18% x 6 months    Nutrition Diagnosis Status:  New

## 2018-08-16 NOTE — PLAN OF CARE
Problem: Patient Care Overview  Goal: Plan of Care Review  Outcome: Ongoing (interventions implemented as appropriate)  Recommendations     1. When medically feasible, ADAT to 2 gram sodium (fluid restriction per MD).  2. Add Boost Glucose Control ONS to aid in caloric intake.   3. RD to monitor & follow-up.

## 2018-08-16 NOTE — PLAN OF CARE
Problem: Patient Care Overview  Goal: Plan of Care Review  Outcome: Ongoing (interventions implemented as appropriate)  Pt. AAOx4, no complaints of pain or discomfort. CBG monitored, insulin given per order. Albumin x 1 given; midnight vitals displayed low BP. John Desai PA-C informed and came to bedside. Blood consent obtained. Type and screen results pending. VSS. TM.

## 2018-08-16 NOTE — HPI
"HPI:  Scarlett Reddy is a 69 y/o female with past medical history of Hep B and D cirrhosis, on Entecavir with undetectable Hep B DNA, c/b EV with bleeding, HE,  Previous H/O PV thrombosis, and refractory ascites, T2DM, who presented as a transfer from [facility] for evaluation for liver transplant surgery. Per records, the patient has been declining recently with hyponatremia due to diuretics (Na of 121), that improved to 129 with holding diuretics, but now requiring paracentesis every 7-10 days. She presented to a hospital in [facility] for progressive weakness, found to have worsening hyponatremia. Per son report, the patient declined rapidly at the end of the trip to [place], and had to be transported by EMS.  On admit, patient was somnolent and improved w lactulose enema.      Patient since admission feels well .She states that she does not remember the details of her trip. She states that she had a paracentesis just before flying to [place]. She denies f/c, denies N/V, deines abdominal pain. Endorses abdominal swelling. Denies melena or hematemesis.    Patient was also on Lovenox for suspected portal vein thrombosis in [facility], of note PV patent on all studies since admit.  The patient notes she was active and independent prior to these event. She denies alcohol use. Labs on presentation notable for PLT of 85, Na 124, Cr 1.4, ALKP 658, Tbili 2.1, AST//160       PSH: D&C for Miscarriage >30 years ago, lower midline scar, denies hysterectomy/c section.      Estimated body mass index is 20.83 kg/m² as calculated from the following:    Height as of this encounter: 5' 5" (1.651 m).    Weight as of this encounter: 56.8 kg (125 lb 3.2 oz).     Blood Group : A POS     : Alexa    MELD-Na score: 18 at 8/23/2018  3:51 AM  MELD score: 12 at 8/23/2018  3:51 AM  Calculated from:  Serum Creatinine: 0.9 mg/dL (Rounded to 1 mg/dL) at 8/23/2018  3:51 AM  Serum Sodium: 130 mmol/L at 8/23/2018  3:51 AM  Total " Bilirubin: 2.4 mg/dL at 8/23/2018  3:51 AM  INR(ratio): 1.2 at 8/23/2018  3:51 AM  Age: 68 years

## 2018-08-16 NOTE — NURSING
"Type and screen resulted at 0620. Blood released and to be hung by RN before end of shift. Education on transfusion reaction symptoms provided and  states, "pt verbalized understanding." WCTM.  "

## 2018-08-17 ENCOUNTER — ANESTHESIA EVENT (OUTPATIENT)
Dept: ENDOSCOPY | Facility: HOSPITAL | Age: 68
DRG: 871 | End: 2018-08-17
Payer: COMMERCIAL

## 2018-08-17 ENCOUNTER — ANESTHESIA (OUTPATIENT)
Dept: ENDOSCOPY | Facility: HOSPITAL | Age: 68
DRG: 871 | End: 2018-08-17
Payer: COMMERCIAL

## 2018-08-17 LAB
ALBUMIN SERPL BCP-MCNC: 4.2 G/DL
ALP SERPL-CCNC: 392 U/L
ALT SERPL W/O P-5'-P-CCNC: 92 U/L
ANION GAP SERPL CALC-SCNC: 12 MMOL/L
ANISOCYTOSIS BLD QL SMEAR: SLIGHT
AST SERPL-CCNC: 105 U/L
BASOPHILS # BLD AUTO: 0.01 K/UL
BASOPHILS NFR BLD: 0 %
BASOPHILS NFR BLD: 0.5 %
BILIRUB SERPL-MCNC: 3.8 MG/DL
BUN SERPL-MCNC: 50 MG/DL
CALCIUM SERPL-MCNC: 9 MG/DL
CHLORIDE SERPL-SCNC: 105 MMOL/L
CMV IGG SERPL QL IA: REACTIVE
CO2 SERPL-SCNC: 13 MMOL/L
CREAT SERPL-MCNC: 1 MG/DL
DIFFERENTIAL METHOD: ABNORMAL
DIFFERENTIAL METHOD: ABNORMAL
EBV VCA IGG SER QL IA: POSITIVE
EOSINOPHIL # BLD AUTO: 0 K/UL
EOSINOPHIL NFR BLD: 1 %
EOSINOPHIL NFR BLD: 1 %
ERYTHROCYTE [DISTWIDTH] IN BLOOD BY AUTOMATED COUNT: 19.5 %
ERYTHROCYTE [DISTWIDTH] IN BLOOD BY AUTOMATED COUNT: 20 %
EST. GFR  (AFRICAN AMERICAN): >60 ML/MIN/1.73 M^2
EST. GFR  (NON AFRICAN AMERICAN): 57.6 ML/MIN/1.73 M^2
FIBRINOGEN PPP-MCNC: 253 MG/DL
GLUCOSE SERPL-MCNC: 192 MG/DL
HCT VFR BLD AUTO: 25.5 %
HCT VFR BLD AUTO: 25.6 %
HEPATITIS B VIRAL DNA - QUANTITATIVE: 79 IU/ML
HEPATITIS B VIRUS DNA: DETECTED
HGB BLD-MCNC: 8.6 G/DL
HGB BLD-MCNC: 8.6 G/DL
HYPOCHROMIA BLD QL SMEAR: ABNORMAL
IMM GRANULOCYTES # BLD AUTO: 0.01 K/UL
IMM GRANULOCYTES # BLD AUTO: ABNORMAL K/UL
IMM GRANULOCYTES NFR BLD AUTO: 0.5 %
IMM GRANULOCYTES NFR BLD AUTO: ABNORMAL %
INR PPP: 1.1
LACTATE SERPL-SCNC: 1.9 MMOL/L
LOG HBV IU/ML: 1.9 LOG (10) IU/ML
LYMPHOCYTES # BLD AUTO: 0.4 K/UL
LYMPHOCYTES NFR BLD: 18.2 %
LYMPHOCYTES NFR BLD: 26 %
MAGNESIUM SERPL-MCNC: 2.3 MG/DL
MCH RBC QN AUTO: 30.2 PG
MCH RBC QN AUTO: 30.8 PG
MCHC RBC AUTO-ENTMCNC: 33.6 G/DL
MCHC RBC AUTO-ENTMCNC: 33.7 G/DL
MCV RBC AUTO: 90 FL
MCV RBC AUTO: 92 FL
MONOCYTES # BLD AUTO: 0.4 K/UL
MONOCYTES NFR BLD: 17.2 %
MONOCYTES NFR BLD: 5 %
NEUTROPHILS # BLD AUTO: 1.3 K/UL
NEUTROPHILS NFR BLD: 62.6 %
NEUTROPHILS NFR BLD: 68 %
NRBC BLD-RTO: 0 /100 WBC
NRBC BLD-RTO: 0 /100 WBC
OB PNL STL: POSITIVE
OVALOCYTES BLD QL SMEAR: ABNORMAL
PHOSPHATE SERPL-MCNC: 2.8 MG/DL
PLATELET # BLD AUTO: 47 K/UL
PLATELET # BLD AUTO: 49 K/UL
PLATELET BLD QL SMEAR: ABNORMAL
PMV BLD AUTO: 10.7 FL
PMV BLD AUTO: 11.1 FL
POCT GLUCOSE: 157 MG/DL (ref 70–110)
POCT GLUCOSE: 167 MG/DL (ref 70–110)
POCT GLUCOSE: 206 MG/DL (ref 70–110)
POCT GLUCOSE: 211 MG/DL (ref 70–110)
POCT GLUCOSE: 222 MG/DL (ref 70–110)
POIKILOCYTOSIS BLD QL SMEAR: SLIGHT
POLYCHROMASIA BLD QL SMEAR: ABNORMAL
POTASSIUM SERPL-SCNC: 3.9 MMOL/L
PROT SERPL-MCNC: 5.7 G/DL
PROTHROMBIN TIME: 11.5 SEC
RBC # BLD AUTO: 2.79 M/UL
RBC # BLD AUTO: 2.85 M/UL
SCHISTOCYTES BLD QL SMEAR: ABNORMAL
SODIUM SERPL-SCNC: 130 MMOL/L
STRONGYLOIDES ANTIBODY IGG: NEGATIVE
WBC # BLD AUTO: 1.93 K/UL
WBC # BLD AUTO: 2.09 K/UL

## 2018-08-17 PROCEDURE — 83735 ASSAY OF MAGNESIUM: CPT | Mod: NTX

## 2018-08-17 PROCEDURE — 99233 SBSQ HOSP IP/OBS HIGH 50: CPT | Mod: NTX,,, | Performed by: HOSPITALIST

## 2018-08-17 PROCEDURE — 99223 1ST HOSP IP/OBS HIGH 75: CPT | Mod: NTX,,, | Performed by: PHYSICIAN ASSISTANT

## 2018-08-17 PROCEDURE — 84100 ASSAY OF PHOSPHORUS: CPT | Mod: NTX

## 2018-08-17 PROCEDURE — 85384 FIBRINOGEN ACTIVITY: CPT | Mod: NTX

## 2018-08-17 PROCEDURE — 85025 COMPLETE CBC W/AUTO DIFF WBC: CPT | Mod: NTX

## 2018-08-17 PROCEDURE — 43235 EGD DIAGNOSTIC BRUSH WASH: CPT | Mod: NTX | Performed by: INTERNAL MEDICINE

## 2018-08-17 PROCEDURE — D9220A PRA ANESTHESIA: Mod: CRNA,NTX,, | Performed by: NURSE ANESTHETIST, CERTIFIED REGISTERED

## 2018-08-17 PROCEDURE — 37000009 HC ANESTHESIA EA ADD 15 MINS: Mod: NTX | Performed by: INTERNAL MEDICINE

## 2018-08-17 PROCEDURE — 25000003 PHARM REV CODE 250: Mod: NTX | Performed by: NURSE ANESTHETIST, CERTIFIED REGISTERED

## 2018-08-17 PROCEDURE — 63600175 PHARM REV CODE 636 W HCPCS: Mod: NTX | Performed by: HOSPITALIST

## 2018-08-17 PROCEDURE — 43235 EGD DIAGNOSTIC BRUSH WASH: CPT | Mod: NTX,,, | Performed by: INTERNAL MEDICINE

## 2018-08-17 PROCEDURE — 81001 URINALYSIS AUTO W/SCOPE: CPT | Mod: NTX

## 2018-08-17 PROCEDURE — 80053 COMPREHEN METABOLIC PANEL: CPT | Mod: NTX

## 2018-08-17 PROCEDURE — 25000003 PHARM REV CODE 250: Mod: NTX | Performed by: HOSPITALIST

## 2018-08-17 PROCEDURE — 85027 COMPLETE CBC AUTOMATED: CPT | Mod: NTX

## 2018-08-17 PROCEDURE — 82962 GLUCOSE BLOOD TEST: CPT | Mod: NTX | Performed by: INTERNAL MEDICINE

## 2018-08-17 PROCEDURE — 37000008 HC ANESTHESIA 1ST 15 MINUTES: Mod: NTX | Performed by: INTERNAL MEDICINE

## 2018-08-17 PROCEDURE — 85007 BL SMEAR W/DIFF WBC COUNT: CPT | Mod: NTX

## 2018-08-17 PROCEDURE — 63600175 PHARM REV CODE 636 W HCPCS: Mod: NTX | Performed by: NURSE ANESTHETIST, CERTIFIED REGISTERED

## 2018-08-17 PROCEDURE — 85610 PROTHROMBIN TIME: CPT | Mod: NTX

## 2018-08-17 PROCEDURE — 0DJ08ZZ INSPECTION OF UPPER INTESTINAL TRACT, VIA NATURAL OR ARTIFICIAL OPENING ENDOSCOPIC: ICD-10-PCS | Performed by: INTERNAL MEDICINE

## 2018-08-17 PROCEDURE — 36415 COLL VENOUS BLD VENIPUNCTURE: CPT | Mod: NTX

## 2018-08-17 PROCEDURE — 87040 BLOOD CULTURE FOR BACTERIA: CPT | Mod: NTX

## 2018-08-17 PROCEDURE — 83605 ASSAY OF LACTIC ACID: CPT | Mod: NTX

## 2018-08-17 PROCEDURE — 20600001 HC STEP DOWN PRIVATE ROOM: Mod: NTX

## 2018-08-17 PROCEDURE — 99231 SBSQ HOSP IP/OBS SF/LOW 25: CPT | Mod: NTX,,, | Performed by: INTERNAL MEDICINE

## 2018-08-17 PROCEDURE — D9220A PRA ANESTHESIA: Mod: ANES,NTX,, | Performed by: ANESTHESIOLOGY

## 2018-08-17 RX ORDER — SODIUM CHLORIDE 0.9 % (FLUSH) 0.9 %
3 SYRINGE (ML) INJECTION
Status: DISCONTINUED | OUTPATIENT
Start: 2018-08-17 | End: 2018-08-21

## 2018-08-17 RX ORDER — SODIUM CHLORIDE 9 MG/ML
INJECTION, SOLUTION INTRAVENOUS CONTINUOUS PRN
Status: DISCONTINUED | OUTPATIENT
Start: 2018-08-17 | End: 2018-08-19

## 2018-08-17 RX ORDER — PROPOFOL 10 MG/ML
VIAL (ML) INTRAVENOUS CONTINUOUS PRN
Status: DISCONTINUED | OUTPATIENT
Start: 2018-08-17 | End: 2018-08-19

## 2018-08-17 RX ORDER — LIDOCAINE HCL/PF 100 MG/5ML
SYRINGE (ML) INTRAVENOUS
Status: DISCONTINUED | OUTPATIENT
Start: 2018-08-17 | End: 2018-08-19

## 2018-08-17 RX ORDER — PROPOFOL 10 MG/ML
VIAL (ML) INTRAVENOUS
Status: DISCONTINUED | OUTPATIENT
Start: 2018-08-17 | End: 2018-08-19

## 2018-08-17 RX ADMIN — INSULIN ASPART 2 UNITS: 100 INJECTION, SOLUTION INTRAVENOUS; SUBCUTANEOUS at 11:08

## 2018-08-17 RX ADMIN — MIDODRINE HYDROCHLORIDE 10 MG: 5 TABLET ORAL at 08:08

## 2018-08-17 RX ADMIN — SODIUM CHLORIDE: 0.9 INJECTION, SOLUTION INTRAVENOUS at 03:08

## 2018-08-17 RX ADMIN — INSULIN ASPART 2 UNITS: 100 INJECTION, SOLUTION INTRAVENOUS; SUBCUTANEOUS at 08:08

## 2018-08-17 RX ADMIN — SODIUM BICARBONATE 650 MG TABLET 1300 MG: at 08:08

## 2018-08-17 RX ADMIN — SODIUM BICARBONATE 650 MG TABLET 1300 MG: at 05:08

## 2018-08-17 RX ADMIN — LACTULOSE 30 G: 20 SOLUTION ORAL at 05:08

## 2018-08-17 RX ADMIN — LIDOCAINE HYDROCHLORIDE 100 MG: 20 INJECTION, SOLUTION INTRAVENOUS at 03:08

## 2018-08-17 RX ADMIN — PROPOFOL 100 MCG/KG/MIN: 10 INJECTION, EMULSION INTRAVENOUS at 03:08

## 2018-08-17 RX ADMIN — INSULIN ASPART 1 UNITS: 100 INJECTION, SOLUTION INTRAVENOUS; SUBCUTANEOUS at 09:08

## 2018-08-17 RX ADMIN — MIDODRINE HYDROCHLORIDE 10 MG: 5 TABLET ORAL at 05:08

## 2018-08-17 RX ADMIN — PROPOFOL 50 MG: 10 INJECTION, EMULSION INTRAVENOUS at 03:08

## 2018-08-17 RX ADMIN — INSULIN ASPART 12 UNITS: 100 INJECTION, SOLUTION INTRAVENOUS; SUBCUTANEOUS at 05:08

## 2018-08-17 NOTE — PLAN OF CARE
Problem: Patient Care Overview  Goal: Plan of Care Review  Outcome: Ongoing (interventions implemented as appropriate)  Patient AAOx4, VSS, no complaints of pain or discomfort. CBG monitored. Insulin PRN given per order. Rested  throughout night with  at bedside. WCNAN.

## 2018-08-17 NOTE — ASSESSMENT & PLAN NOTE
Scarlett Reddy is a 69 y/o female with past medical history of Hep B and D cirrhosis, on entecavir with undetectable Hep B DNA, c/b EV with bleeding, HE, PV thrombosis, and refractory ascites, T2DM, who presented as a transfer from [facility] for evaluation of liver transplant.     The patient had encephalopathy in route to [place]. She has been treated and now mental status improved. She underwent diagnostic paracentesis that was negative for SBP.  Undergoing evaluation for liver transplant currently..     Plan:  -Decompensated liver cirrhosis secondary to Hep B: Undergoing evaluation currently. Pending dobutamine stress test, and getting mammogram report. Getting entecavir for Hep B. DNA titer pending. PET pending.  -Continue with lactulose for HE.   -Anemia: significant drop from ~10 --> 6.8 unclear etiology. No signs of active GI bleeding. Had variceal bleeding ~2 months ago. Will have an EGD today.  -Hyponatremia: Agree with holding diuresis and fluid restriction.  -EV: On propranolol for secondary PPx. Holding in the setting of hypotension on admission.  -No history of HCC. AFP 2.9  -f/u daily CMP, INR.

## 2018-08-17 NOTE — [employer] PATIENT INSTRUCTIONS
Discharge Summary/Instructions after an Endoscopic Procedure  Patient Name: Scarlett Reddy  Patient MRN: [id number]  Patient YOB: 1950 Friday, August 17, 2018  Travon Delgadillo MD  RESTRICTIONS:  During your procedure today, you received medications for sedation.  These   medications may affect your judgment, balance and coordination.  Therefore,   for 24 hours, you have the following restrictions:   - DO NOT drive a car, operate machinery, make legal/financial decisions,   sign important papers or drink alcohol.    ACTIVITY:  Today: no heavy lifting, straining or running due to procedural   sedation/anesthesia.  The following day: return to full activity including work.  DIET:  Eat and drink normally unless instructed otherwise.     TREATMENT FOR COMMON SIDE EFFECTS:  - Mild abdominal pain, nausea, belching, bloating or excessive gas:  rest,   eat lightly and use a heating pad.  - Sore Throat: treat with throat lozenges and/or gargle with warm salt   water.  - Because air was used during the procedure, expelling large amounts of air   from your rectum or belching is normal.  - If a bowel prep was taken, you may not have a bowel movement for 1-3 days.    This is normal.  SYMPTOMS TO WATCH FOR AND REPORT TO YOUR PHYSICIAN:  1. Abdominal pain or bloating, other than gas cramps.  2. Chest pain.  3. Back pain.  4. Signs of infection such as: chills or fever occurring within 24 hours   after the procedure.  5. Rectal bleeding, which would show as bright red, maroon, or black stools.   (A tablespoon of blood from the rectum is not serious, especially if   hemorrhoids are present.)  6. Vomiting.  7. Weakness or dizziness.  GO DIRECTLY TO THE NEAREST EMERGENCY ROOM IF YOU HAVE ANY OF THE FOLLOWING:      Difficulty breathing              Chills and/or fever over 101 F   Persistent vomiting and/or vomiting blood   Severe abdominal pain   Severe chest pain   Black, tarry stools   Bleeding- more than one tablespoon   Any  other symptom or condition that you feel may need urgent attention  Your doctor recommends these additional instructions:  If any biopsies were taken, your doctors clinic will contact you in 1 to 2   weeks with any results.  - Return patient to hospital [facility] for ongoing care.   - Resume previous diet today.   - Continue present medications.  For questions, problems or results please call your physician - Travon Delgadillo MD at Work:  [phone].  [facility], EMERGENCY ROOM PHONE NUMBER: [phone]  IF A COMPLICATION OR EMERGENCY SITUATION ARISES AND YOU ARE UNABLE TO REACH   YOUR PHYSICIAN - GO DIRECTLY TO THE EMERGENCY ROOM.  Travon Delgadillo MD  8/17/2018 4:29:15 PM  This report has been verified and signed electronically.  [employer]

## 2018-08-17 NOTE — ANESTHESIA PREPROCEDURE EVALUATION
08/17/2018  [facility]  Anesthesia Pre-Operative Evaluation         Patient Name: Scarlett Reddy  YOB: 1950  MRN: [id number]    SUBJECTIVE:     Pre-operative evaluation for Procedure(s) (LRB):  EGD (ESOPHAGOGASTRODUODENOSCOPY) (N/A)     08/17/2018    Scarlett Reddy is a 68 y.o. female w/ a significant PMHx of DM II, anemia, esophageal varices, and Hep B and D cirrhosis who is now scheduled for above procedure as part of liver transplant workup.     MELD-Na score: 19 at 8/17/2018  3:33 AM  MELD score: 13 at 8/17/2018  3:33 AM  Calculated from:  Serum Creatinine: 1 mg/dL at 8/17/2018  3:33 AM  Serum Sodium: 130 mmol/L at 8/17/2018  3:33 AM  Total Bilirubin: 3.8 mg/dL at 8/17/2018  3:33 AM  INR(ratio): 1.1 at 8/17/2018  3:33 AM  Age: 68 years    LDA:        Peripheral IV - Single Lumen 08/14/18 Anterior;Left Antecubital   IV Properties Placement Date: 08/14/18 Present Prior to Hospital Arrival?: No Size/Length: 20 G Orientation: Anterior;Left Location: Antecubital           Prev airway: None documented.     Patient Active Problem List   Diagnosis    Hepatic encephalopathy    Liver transplant candidate    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    Type 2 diabetes mellitus, with long-term current use of insulin    Esophageal varices without bleeding    Severe malnutrition    Anemia requiring transfusions    Pre-transplant evaluation for liver transplant       Review of patient's allergies indicates:  No Known Allergies    Current Inpatient Medications:   entecavir  0.5 mg Oral Every other day    insulin aspart U-100  12 Units Subcutaneous TIDWM    insulin detemir U-100  20 Units Subcutaneous QHS    lactulose  30 g Oral TID    midodrine  10 mg Oral TID    sodium bicarbonate  1,300 mg Oral TID       No current facility-administered medications on file prior to  encounter.      Current Outpatient Medications on File Prior to Encounter   Medication Sig Dispense Refill    entecavir (BARACLUDE) 0.5 MG Tab Take 0.5 mg by mouth once daily.      insulin aspart U-100 (NOVOLOG) 100 unit/mL injection Inject into the skin 3 (three) times daily before meals. 10-17 units sliding scale with meals      insulin glargine (LANTUS) 100 unit/mL injection Inject 24 Units into the skin every evening.          History reviewed. No pertinent surgical history.    Social History     Socioeconomic History    Marital status:      Spouse name: Not on file    Number of children: Not on file    Years of education: Not on file    Highest education level: Not on file   Social Needs    Financial resource strain: Not on file    Food insecurity - worry: Not on file    Food insecurity - inability: Not on file    Transportation needs - medical: Not on file    Transportation needs - non-medical: Not on file   Occupational History    Not on file   Tobacco Use    Smoking status: Unknown If Ever Smoked   Substance and Sexual Activity    Alcohol use: Not on file    Drug use: Not on file    Sexual activity: Not on file   Other Topics Concern    Not on file   Social History Narrative    Not on file       OBJECTIVE:     Vital Signs Range (Last 24H):  Temp:  [36.2 °C (97.1 °F)-37 °C (98.6 °F)]   Pulse:  [81-98]   Resp:  [14-20]   BP: ()/(39-58)   SpO2:  [97 %-100 %]       CBC:   Recent Labs      08/16/18   2035  08/17/18   0806   WBC  2.56*  2.09*   RBC  2.86*  2.85*   HGB  8.7*  8.6*   HCT  26.2*  25.5*   PLT  53*  49*   MCV  92  90   MCH  30.4  30.2   MCHC  33.2  33.7       CMP:   Recent Labs      08/16/18   0214  08/17/18   0333   NA  129*  130*   K  3.6  3.9   CL  105  105   CO2  13*  13*   BUN  62*  50*   CREATININE  1.4  1.0   GLU  271*  192*   MG  2.4  2.3   PHOS  2.9  2.8   CALCIUM  8.8  9.0   ALBUMIN  4.0  4.2   PROT  5.6*  5.7*   ALKPHOS  443*  392*   ALT  122*  92*   AST  149*   105*   BILITOT  1.8*  3.8*       INR:  Recent Labs      08/15/18   0622  08/16/18   0214  08/17/18   0333   INR  1.1  1.2  1.1       EKG:   Normal sinus rhythm  Normal ECG  No previous ECGs available  Confirmed by Keyla DE LA PAZ, Nick Vazquez ([id number]) on 8/15/2018 8:51:49 AM    2D ECHO:  No results found for this or any previous visit.      ASSESSMENT/PLAN:         Anesthesia Evaluation    I have reviewed the Patient Summary Reports.        Review of Systems  Anesthesia Hx:  No problems with previous Anesthesia  Neg history of prior surgery. Denies Family Hx of Anesthesia complications.   Denies Personal Hx of Anesthesia complications.   Hematology/Oncology:     Oncology Normal    -- Anemia:   EENT/Dental:EENT/Dental Normal   Cardiovascular:  Cardiovascular Normal     Pulmonary:  Pulmonary Normal    Hepatic/GI:   Liver Disease, Hepatitis, D H/o esophageal varices   Neurological:   Hepatic encephalopathy    Endocrine:   Diabetes, type 2        Physical Exam  General:  Well nourished    Airway/Jaw/Neck:  Airway Findings: Mouth Opening: Normal Tongue: Normal  General Airway Assessment: Adult  Mallampati: III  Improves to II with phonation.  TM Distance: Normal, at least 6 cm     Eyes/Ears/Nose:  EYES/EARS/NOSE FINDINGS: Normal   Dental:  Dental Findings: In tact   Chest/Lungs:  Chest/Lungs Findings: Clear to auscultation     Heart/Vascular:  Heart Findings: Rate: Normal        Mental Status:  Mental Status Findings:  Cooperative, Alert and Oriented         Anesthesia Plan  Type of Anesthesia, risks & benefits discussed:  Anesthesia Type:  general, MAC  Patient's Preference:   Intra-op Monitoring Plan: standard ASA monitors  Intra-op Monitoring Plan Comments:   Post Op Pain Control Plan:   Post Op Pain Control Plan Comments:   Induction:   IV  Beta Blocker:  Patient is not currently on a Beta-Blocker (No further documentation required).       Informed Consent: Patient understands risks and agrees with Anesthesia plan.   Questions answered. Anesthesia consent signed with patient.  ASA Score: 3     Day of Surgery Review of History & Physical: I have interviewed and examined the patient. I have reviewed the patient's H&P dated:    H&P update referred to the surgeon.         Ready For Surgery From Anesthesia Perspective.

## 2018-08-17 NOTE — CONSULTS
Infectious Disease Liver Heart Pre-Transplant Evaluation Note:    Reason for Consult: Pre Liver Transplant evaluation    Assessment, Plan and Counselin. Risks of Infection: No unusual risks of infection or significant barriers to transplantation were identified from the infectious disease standpoint at this time subject to the following.     A.  The patient's Quantiferon test was reviewed and is negative.   B. RPR and HIV negative   C. Hepatitis B positive; currently undergoing treatment; Hep C ab negative   D. Strongyloides and schistosoma antibodies pending; if positive, please re-consult ID.      2. Immunizations:  The patient's immunization history and serologies were reviewed.  Based on the patients immunization history and serologies, immunizations were ordered:       1. prevnar-13 today; will need pneumovax-23 in 2 months   2. Flu when available and yearly   3. TDAP today; tetanus every 10 years   4. shingrix when available    The patient was encouraged to contact us about any problems that may develop after immunization and possible side effects were reviewed.      3. Counseling: discussed with the patient the risk for increased susceptibility to infections following transplantation including increased risk for infection right after transplant and if rejection should occur.  The patients has been counseled on the importance of vaccinations including but not limited to a yearly flu vaccine.    4. Specific guidance has been provided to the patient regarding the patients occupation, hobbies and activities to avoid future infectious complications including but not limited to avoiding undercooked meats and seafood, proper hygiene, and contact with animals.        Final determination of transplant candidacy will be made once evaluation is complete and reviewed by the Liver Transplant Selection Committee.    Problem List:  Active Hospital Problems    Diagnosis  POA    *Hepatic encephalopathy [K72.90]  Yes     Severe malnutrition [E43]  Yes    Anemia requiring transfusions [D64.9]  No    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 [B18.0, K74.60]  Yes    Type 2 diabetes mellitus, with long-term current use of insulin [E11.9, Z79.4]  Not Applicable    Esophageal varices without bleeding [I85.00]  Yes    Liver transplant candidate [Z76.82]  Not Applicable      Resolved Hospital Problems   No resolved problems to display.       Chief Complaint:  Liver Evaluation    History of Present Illness:  A 68 y.o.-year-old female with cirrhosis currently being evaluated for liver transplant.     Infectious disease is consulted for liver transplant evaluation.     The infectious disease pre-transplant questionnaire was reviewed.     Patient denies recent fevers, chills, infective illnesses.     No history of diabetes.  No current or long term steroid use.   Denies splenectomy    Denies history of chronic, recurring infections of sinuses, throat, bladder/kidneys, intestines, skin, reproductive organs, teeth or gums.     History of chickenpox.  No shingles.   Denies history of syphilis, gonorrhea, other STDs/viral hepatitis/ or other infective illnesses.     Denies known exposure to TB  No history of residence in coccidioides endemic areas  Patient is from [facility] and currently resides there.     Animal exposures: none  Occupational:schoolteacher  Hobbies: indoor      Immunization history:  Usual childhood vaccines  Flu  Tdap- unsure  Hepatitis A- IgG positive  Hepatitis B- has Hep B disease  Pneumonia- at 64  Shingles - unsure      Serologies:   Ref. Range 8/15/2018 09:48 8/15/2018 10:48 8/16/2018 02:14   HBsAg Confirmation Latest Ref Range: Negative   Positive (A)    Hep B Core Total Ab Unknown  Positive (A)    Hep B S Ab Unknown  Negative    Hepatitis B Surface Ag Unknown  Positive (A)    Hepatitis C Ab Unknown  Negative    Mitogen - Nil Latest Ref Range: See text IU/mL 6.790     NIL Latest Ref Range: See text IU/mL  0.040     TB Gold Plus Unknown Negative     TB1 - Nil Latest Ref Range: See text IU/mL 0.020     TB2 - Nil Latest Ref Range: See text IU/mL 0.010     HIV 1/2 Ag/Ab Latest Ref Range: Negative   Negative    RPR Latest Ref Range: Non-reactive    Non-reactive   CMV IgG Interpretation Unknown   Reactive (A)   Jacob-Barr Virus IgG (VCA) Latest Ref Range: Negative    Positive (A)   Varicella IgG Latest Ref Range: 0.00 - 0.90 ISR   1.42 (H)   Varicella Interpretation Latest Ref Range: Negative    Positive (A)       Past Medical History:  Past Medical History:   Diagnosis Date    Ascites     Cirrhosis     Esophageal varices     Hepatic encephalopathy     Hepatitis B     Hepatitis D virus infection     Hypersplenism     Portal hypertension        Past Surgical History:  History reviewed. No pertinent surgical history.    Family History:  History reviewed. No pertinent family history.    Social History:  Social History     Socioeconomic History    Marital status:      Spouse name: Not on file    Number of children: Not on file    Years of education: Not on file    Highest education level: Not on file   Social Needs    Financial resource strain: Not on file    Food insecurity - worry: Not on file    Food insecurity - inability: Not on file    Transportation needs - medical: Not on file    Transportation needs - non-medical: Not on file   Occupational History    Not on file   Tobacco Use    Smoking status: Unknown If Ever Smoked   Substance and Sexual Activity    Alcohol use: Not on file    Drug use: Not on file    Sexual activity: Not on file   Other Topics Concern    Not on file   Social History Narrative    Not on file       Allergies:  Review of patient's allergies indicates:  No Known Allergies    Immunizations:    There is no immunization history on file for this patient.     Review of Symptoms:  Review of Systems   Constitution: Negative for chills, decreased appetite, fever, weakness,  malaise/fatigue, night sweats, weight gain and weight loss.   HENT: Negative for congestion, ear pain, hearing loss, hoarse voice, sore throat and tinnitus.    Eyes: Negative for blurred vision, redness and visual disturbance.   Cardiovascular: Negative for chest pain, leg swelling and palpitations.   Respiratory: Negative for cough, hemoptysis, shortness of breath, sputum production and wheezing.    Endocrine: Negative for cold intolerance and heat intolerance.   Hematologic/Lymphatic: Negative for adenopathy. Does not bruise/bleed easily.   Skin: Negative for dry skin, itching, rash and suspicious lesions.   Musculoskeletal: Negative for back pain, joint pain, myalgias and neck pain.   Gastrointestinal: Negative for abdominal pain, constipation, diarrhea, heartburn, nausea and vomiting.   Genitourinary: Negative for dysuria, flank pain, frequency, hematuria, hesitancy and urgency.   Neurological: Negative for dizziness, headaches, numbness and paresthesias.   Psychiatric/Behavioral: Negative for depression and memory loss. The patient does not have insomnia and is not nervous/anxious.    Allergic/Immunologic: Negative for environmental allergies, HIV exposure, hives and persistent infections.     Pertinent Medications:  Antibiotics:   Antibiotics (From admission, onward)    None          Physical Exam:  VS (24h):   Vitals:    08/17/18 1149   BP: (!) 90/51   Pulse: 94   Resp: 20   Temp: 98.5 °F (36.9 °C)     Temp:  [97.1 °F (36.2 °C)-98.6 °F (37 °C)]     Physical Exam   Constitutional: She is oriented to person, place, and time. She appears well-developed and well-nourished. No distress.   Cardiovascular: Normal rate and regular rhythm.   No murmur heard.  Pulmonary/Chest: Effort normal and breath sounds normal. No respiratory distress.   Abdominal: Soft. Bowel sounds are normal. She exhibits no distension.   Mild distension   Musculoskeletal: Normal range of motion. She exhibits no edema.   Lymphadenopathy:         Head (right side): No submental, no submandibular and no tonsillar adenopathy present.        Head (left side): No submental, no submandibular and no tonsillar adenopathy present.     She has no cervical adenopathy.        Right: No supraclavicular adenopathy present.        Left: No supraclavicular adenopathy present.   Neurological: She is alert and oriented to person, place, and time.   Skin: Skin is warm and dry.   Psychiatric: She has a normal mood and affect. Her behavior is normal.          Labs:  CBC:   Lab Results   Component Value Date    WBC 2.09 (L) 08/17/2018    WBC 2.56 (L) 08/16/2018    WBC 2.81 (L) 08/16/2018    WBC 3.38 (L) 08/15/2018    WBC 4.19 08/14/2018    HGB 8.6 (L) 08/17/2018    HCT 25.5 (L) 08/17/2018    MCV 90 08/17/2018    PLT 49 (L) 08/17/2018       BMP:   Recent Labs   Lab  08/17/18   0333   GLU  192*   NA  130*   K  3.9   CL  105   CO2  13*   BUN  50*   CREATININE  1.0   CALCIUM  9.0   MG  2.3       LFT:   Lab Results   Component Value Date    ALT 92 (H) 08/17/2018     (H) 08/17/2018    ALKPHOS 392 (H) 08/17/2018    BILITOT 3.8 (H) 08/17/2018       RPR:   Lab Results   Component Value Date    RPR Non-reactive 08/16/2018        Quantiferon Gold Tb: No results found for: QUANTIFERON    Hepatitis Serologies:   Lab Results   Component Value Date    HEPBCAB Positive (A) 08/15/2018    HEPCAB Negative 08/15/2018        Microbiology x 7d:   Microbiology Results (last 7 days)     Procedure Component Value Units Date/Time    (rule out SBP) Culture, Anaerobic [[id number]] Collected:  08/15/18 1412    Order Status:  Completed Specimen:  Ascites Updated:  08/17/18 1121     Anaerobic Culture Culture in progress    Narrative:       To rule out SBP order labs: Aerobic culture [CNI493],  Culture, Anaerobic [LNA613], Gram stain [UTD856], Albumin  [VDY050], Protein [UWI778], LDH [LVS064], WBC \T\ Dff  [PSU6257].    Urine culture [[id number]] Collected:  08/16/18 0548    Order Status:  Completed  Specimen:  Urine Updated:  08/17/18 1049     Urine Culture, Routine --     GRAM NEGATIVE KEVYN, LACTOSE   >100,000 cfu/ml  Identification and susceptibility pending       Urine Culture, Routine --     ENTEROCOCCUS FAECIUM  > 100,000 cfu/ml  Susceptibility pending      Narrative:       Preferred Collection Type->Urine, Clean Catch    Blood culture [[id number]] Collected:  08/16/18 0221    Order Status:  Completed Specimen:  Blood Updated:  08/17/18 0612     Blood Culture, Routine No Growth to date     Blood Culture, Routine No Growth to date    Blood culture [[id number]] Collected:  08/16/18 0214    Order Status:  Completed Specimen:  Blood Updated:  08/17/18 0612     Blood Culture, Routine No Growth to date     Blood Culture, Routine No Growth to date    Blood culture #2 [[id number]] Collected:  08/14/18 1149    Order Status:  Completed Specimen:  Blood from Peripheral, Antecubital, Right Updated:  08/16/18 1412     Blood Culture, Routine No Growth to date     Blood Culture, Routine No Growth to date     Blood Culture, Routine No Growth to date    Narrative:       Blood Culture #2    Blood culture #1 [[id number]] Collected:  08/14/18 1149    Order Status:  Completed Specimen:  Blood from Peripheral, Antecubital, Left Updated:  08/16/18 1412     Blood Culture, Routine No Growth to date     Blood Culture, Routine No Growth to date     Blood Culture, Routine No Growth to date    Narrative:       Blood Culture #1    (rule out SBP) Aerobic culture [[id number]] Collected:  08/15/18 1412    Order Status:  Completed Specimen:  Ascites Updated:  08/16/18 0744     Aerobic Bacterial Culture No growth    Narrative:       To rule out SBP order labs: Aerobic culture [AJG075],  Culture, Anaerobic [UDU495], Gram stain [VAV683], Albumin  [CHG975], Protein [SIC523], LDH [WZV418], WBC \T\ Dff  [BHF0010].    Urine culture [[id number]] Collected:  08/16/18 0548    Order Status:  Canceled Specimen:  Urine, Clean Catch Updated:  08/16/18  0724    (rule out SBP) Gram stain [[id number]] Collected:  08/15/18 1412    Order Status:  Completed Specimen:  Ascites Updated:  08/15/18 1830     Gram Stain Result No WBC's      No organisms seen    Narrative:       To rule out SBP order labs: Aerobic culture [EGV603],  Culture, Anaerobic [AVJ594], Gram stain [HUO582], Albumin  [MUC727], Protein [NUB650], LDH [ZSC803], WBC \T\ Dff  [SSZ1095].    Blood culture [[id number]]     Order Status:  Canceled Specimen:  Blood           Imaging:  reviewed       Assessment/Plan - Please see top of page

## 2018-08-17 NOTE — INTERVAL H&P NOTE
The patient has been examined and the H&P has been reviewed:    I concur with the findings and no changes have occurred since H&P was written.    Anesthesia/Surgery risks, benefits and alternative options discussed and understood by patient/family.          Active Hospital Problems    Diagnosis  POA    *Hepatic encephalopathy [K72.90]  Yes    Pre-transplant evaluation for liver transplant [Z01.818]  Not Applicable    Severe malnutrition [E43]  Yes    Anemia requiring transfusions [D64.9]  No    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 [B18.0, K74.60]  Yes    Type 2 diabetes mellitus, with long-term current use of insulin [E11.9, Z79.4]  Not Applicable    Esophageal varices without bleeding [I85.00]  Yes    Liver transplant candidate [Z76.82]  Not Applicable      Resolved Hospital Problems   No resolved problems to display.

## 2018-08-17 NOTE — SUBJECTIVE & OBJECTIVE
Interval History:   The patient states that she is feeling well today. She denies abdominal pain, denies change in bowel habits. Denies melena or hematochezia.   Patient is planned for an EGD today.    Current Facility-Administered Medications   Medication    0.9%  NaCl infusion (for blood administration)    dextrose 50% injection 12.5 g    dextrose 50% injection 25 g    entecavir tablet 0.5 mg    glucagon (human recombinant) injection 1 mg    glucose chewable tablet 16 g    glucose chewable tablet 24 g    insulin aspart U-100 pen 0-5 Units    insulin aspart U-100 pen 12 Units    insulin detemir U-100 pen 20 Units    lactulose 20 gram/30 mL solution Soln 30 g    midodrine tablet 10 mg    sodium bicarbonate tablet 1,300 mg       Objective:     Vital Signs (Most Recent):  Temp: 98.5 °F (36.9 °C) (08/17/18 1149)  Pulse: 94 (08/17/18 1149)  Resp: 20 (08/17/18 1149)  BP: (!) 90/51 (08/17/18 1149)  SpO2: 99 % (08/17/18 1149) Vital Signs (24h Range):  Temp:  [97.1 °F (36.2 °C)-98.6 °F (37 °C)] 98.5 °F (36.9 °C)  Pulse:  [81-98] 94  Resp:  [14-20] 20  SpO2:  [97 %-100 %] 99 %  BP: ()/(39-58) 90/51     Weight: 52.3 kg (115 lb 6.4 oz) (08/17/18 0600)  Body mass index is 19.2 kg/m².    Physical Exam   Constitutional: She is oriented to person, place, and time. She appears cachectic.   HENT:   Head: Normocephalic.   Eyes: Pupils are equal, round, and reactive to light. Scleral icterus is present.   Neck: Normal range of motion. Neck supple.   Cardiovascular: Normal rate and regular rhythm.   Pulmonary/Chest: Effort normal and breath sounds normal.   Abdominal: Soft. Bowel sounds are normal. She exhibits distension. There is no tenderness.   Musculoskeletal: Normal range of motion.   Neurological: She is alert and oriented to person, place, and time.   Skin: Skin is warm and dry.       MELD-Na score: 19 at 8/17/2018  3:33 AM  MELD score: 13 at 8/17/2018  3:33 AM  Calculated from:  Serum Creatinine: 1 mg/dL at  8/17/2018  3:33 AM  Serum Sodium: 130 mmol/L at 8/17/2018  3:33 AM  Total Bilirubin: 3.8 mg/dL at 8/17/2018  3:33 AM  INR(ratio): 1.1 at 8/17/2018  3:33 AM  Age: 68 years    Significant Labs:  CBC:   Recent Labs   Lab  08/17/18   0806   WBC  2.09*   RBC  2.85*   HGB  8.6*   HCT  25.5*   PLT  49*     BMP:   Recent Labs   Lab  08/17/18   0333   GLU  192*   NA  130*   K  3.9   CL  105   CO2  13*   BUN  50*   CREATININE  1.0   CALCIUM  9.0     CMP:   Recent Labs   Lab  08/17/18   0333   GLU  192*   CALCIUM  9.0   ALBUMIN  4.2   PROT  5.7*   NA  130*   K  3.9   CO2  13*   CL  105   BUN  50*   CREATININE  1.0   ALKPHOS  392*   ALT  92*   AST  105*   BILITOT  3.8*     Coagulation:   Recent Labs   Lab  08/17/18   0333   INR  1.1       Significant Imaging:  Labs: Reviewed  CT: Reviewed

## 2018-08-17 NOTE — PLAN OF CARE
Problem: Patient Care Overview  Goal: Plan of Care Review  Outcome: Ongoing (interventions implemented as appropriate)  EGD done today, no acute findings; glucose throughout the day 150-220. Coverage given. No complaints. H/H scheduled Q12 hours. Will monitor.

## 2018-08-17 NOTE — ASSESSMENT & PLAN NOTE
Scarlett Reddy is a 67 y/o female with past medical history of Hep B and D cirrhosis, on entecavir with undetectable Hep B DNA, c/b EV with bleeding, HE, PV thrombosis, and refractory ascites, T2DM, who presented as a transfer from [facility] for evaluation of liver transplant.     The patient had encephalopathy in route to [place]. She has been treated and now mental status improved. She underwent diagnostic paracentesis that was negative for SBP.  Undergoing evaluation for liver transplant currently..     Plan:  -Decompensated liver cirrhosis secondary to Hep B: Undergoing evaluation currently. Pending dobutamine stress test, and getting mammogram report. Getting entecavir for Hep B. DNA titer pending. PET pending.  -Continue with lactulose for HE.   -Anemia: significant drop from ~10 --> 6.8 unclear etiology. No signs of active GI bleeding. Had variceal bleeding ~2 months ago. Will have an EGD today.  -Hyponatremia: Agree with holding diuresis and fluid restriction.  -EV: On propranolol for secondary PPx. Holding in the setting of hypotension on admission.  -No history of HCC. AFP 2.9  -f/u daily CMP, INR.

## 2018-08-17 NOTE — PROGRESS NOTES
Hospital Medicine  Progress note    Team: [facility] MED  Cristobal Rodrigues MD  Admit Date: 8/14/2018  AJIT 8/21/2018  Code status: Full Code    Principal Problem:  Hepatic encephalopathy    Interval hx: Patient seen and examined at bedside, no acute events overnight. EGD performed today was negative for any active bleeding. Dobutamine stress test pending.     ROS   Review of Systems   Constitutional: Negative for chills and fever.   HENT: Negative for ear discharge.    Eyes: Negative for blurred vision and double vision.   Cardiovascular: Negative for chest pain.   Gastrointestinal: Negative for nausea and vomiting.        Abdominal distention    Genitourinary: Negative for dysuria and urgency.   Musculoskeletal: Negative.    Skin: Negative for rash.   Neurological: Negative for tremors, sensory change and speech change.   Psychiatric/Behavioral: Negative.          PEx  Temp:  [97.1 °F (36.2 °C)-99.1 °F (37.3 °C)]   Pulse:  []   Resp:  [14-20]   BP: ()/(37-58)   SpO2:  [97 %-100 %]     Intake/Output Summary (Last 24 hours) at 8/17/2018 1647  Last data filed at 8/17/2018 1523  Gross per 24 hour   Intake 200 ml   Output 0 ml   Net 200 ml     Physical Exam   Constitutional: She is oriented to person, place, and time and well-developed, well-nourished, and in no distress.   HENT:   Head: Normocephalic and atraumatic.   Eyes: Pupils are equal, round, and reactive to light.   Neck: Normal range of motion. Neck supple. No thyromegaly present.   Cardiovascular: Normal rate, regular rhythm and normal heart sounds.   Pulmonary/Chest: Effort normal and breath sounds normal.   Abdominal: Soft. Bowel sounds are normal. She exhibits distension. There is no tenderness. There is no guarding.   Musculoskeletal: Normal range of motion.   Neurological: She is alert and oriented to person, place, and time.   Skin: Skin is warm and dry. No rash noted. No erythema.     Recent Labs   Lab  08/16/18   0214  08/16/18   1015   08/16/18   2035  08/17/18   0806   WBC  2.81*   --   2.56*  2.09*   HGB  6.8*  8.5*  8.7*  8.6*   HCT  20.5*  26.0*  26.2*  25.5*   PLT  58*   --   53*  49*     Recent Labs   Lab  08/14/18   1149   08/15/18   0622  08/16/18   0214  08/17/18   0333   NA  124*   < >  130*  129*  130*   K  3.5   < >  3.3*  3.6  3.9   CL  100   < >  107  105  105   CO2  12*   < >  12*  13*  13*   BUN  69*   < >  66*  62*  50*   CREATININE  1.4   < >  1.4  1.4  1.0   GLU  289*   < >  258*  271*  192*   CALCIUM  9.5   < >  9.3  8.8  9.0   MG  2.7*   --   2.5  2.4  2.3   PHOS   --    --   4.0  2.9  2.8   LIPASE  196*   --    --    --    --     < > = values in this interval not displayed.     Recent Labs   Lab  08/15/18   0622  08/16/18   0214  08/17/18   0333   ALKPHOS  544*  443*  392*   ALT  131*  122*  92*   AST  139*  149*  105*   ALBUMIN  3.7  4.0  4.2   PROT  5.7*  5.6*  5.7*   BILITOT  1.9*  1.8*  3.8*   INR  1.1  1.2  1.1      Recent Labs   Lab  08/16/18   1229  08/16/18   1707  08/16/18   2213  08/17/18   0823  08/17/18   1141  08/17/18   1604   POCTGLUCOSE  159*  146*  263*  206*  222*  157*     No results for input(s): CPK, CPKMB, MB, TROPONINI in the last 72 hours.    Scheduled Meds:   entecavir  0.5 mg Oral Every other day    insulin aspart U-100  12 Units Subcutaneous TIDWM    insulin detemir U-100  20 Units Subcutaneous QHS    lactulose  30 g Oral TID    midodrine  10 mg Oral TID    sodium bicarbonate  1,300 mg Oral TID     Continuous Infusions:  As Needed:  sodium chloride, dextrose 50%, dextrose 50%, glucagon (human recombinant), glucose, glucose, insulin aspart U-100, sodium chloride 0.9%    Active Hospital Problems    Diagnosis  POA    *Hepatic encephalopathy [K72.90]  Yes     Priority: 1 - High    Pre-transplant evaluation for liver transplant [Z01.818]  Not Applicable    Severe malnutrition [E43]  Yes    Anemia requiring transfusions [D64.9]  No    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 [B18.0,  K74.60]  Yes    Type 2 diabetes mellitus, with long-term current use of insulin [E11.9, Z79.4]  Not Applicable    Esophageal varices without bleeding [I85.00]  Yes    Liver transplant candidate [Z76.82]  Not Applicable      Resolved Hospital Problems   No resolved problems to display.       Overview    69 yo F with with above mentioned medical history presents with confusion and decompensated liver cirrhosis, admitted for expedited liver transplant evaluation    Assessment and Plan for Problems addressed today:    Hepatic Encephalopathy  - ammonia levels high at 67  - Started on PO Lactulose, one time rectal lactulose given in the ER  - AAO X 3     Decompensated Liver cirrhosis due to Hep B And D  MELD-Na score: 19 at 8/17/2018  3:33 AM  MELD score: 13 at 8/17/2018  3:33 AM  Calculated from:  Serum Creatinine: 1 mg/dL at 8/17/2018  3:33 AM  Serum Sodium: 130 mmol/L at 8/17/2018  3:33 AM  Total Bilirubin: 3.8 mg/dL at 8/17/2018  3:33 AM  INR(ratio): 1.1 at 8/17/2018  3:33 AM  Age: 68 years  - Hold beta blockers due to hypotension  - IR paracentesis negative for SBP, d/c ceftriaxone   - UA/Urine culture  - blood cultures NGTD  - Fluid restriction  - US liver doppler  - Hepatology consult  - AFP 2.9  - Lactic acid 2.5 on admission, now 2.0  - PETH pending  - EGD on 08/17 negative for active bleeding, Portal HTN gastropathy and Grade I varices    Anemia requiring blood transfusions  - transfused one unit of PRBCs 8/16, responded well   - CBC q 8 hrs, transfuse to above 7.0     Metabolic acidosis  - VBG: metabolic acidosis with respiratory alkalosis, normal pH  - start sodium bicarbonate tablets      Diabetes type 2  - POCT q ACHS  - low dose sliding scale   - HgbA1c 6.8  - cont insulin at night, 20 units, 10 units TID      Hyponatremia  - most likely hypervolemic  - fluid restriction  - Urine sodium  - 124-- 131--128-->130     Portal Vein thrombosis  - US liver doppler: no evidence of thrombus   - on Lovenox SQ  daily, holding given recent anemia      Diet:  Low sodium  DVT PPx:    OLAF/SCD  Goals of Care: Full code    Discharge plan and follow up  Pending resolution of bleeding and liver transplant workup    Provider    Cristobal Melendez MD  Staff Hospitalist  Department of Hospital Medicine  [facility]   [phone]

## 2018-08-18 LAB
ALBUMIN SERPL BCP-MCNC: 3.9 G/DL
ALBUMIN SERPL BCP-MCNC: 3.9 G/DL
ALP SERPL-CCNC: 470 U/L
ALP SERPL-CCNC: 473 U/L
ALT SERPL W/O P-5'-P-CCNC: 115 U/L
ALT SERPL W/O P-5'-P-CCNC: 115 U/L
ANION GAP SERPL CALC-SCNC: 11 MMOL/L
ANION GAP SERPL CALC-SCNC: 9 MMOL/L
ANISOCYTOSIS BLD QL SMEAR: ABNORMAL
ANISOCYTOSIS BLD QL SMEAR: SLIGHT
AST SERPL-CCNC: 168 U/L
AST SERPL-CCNC: 169 U/L
BACTERIA #/AREA URNS AUTO: ABNORMAL /HPF
BACTERIA SPEC AEROBE CULT: NO GROWTH
BASOPHILS # BLD AUTO: 0.01 K/UL
BASOPHILS # BLD AUTO: 0.01 K/UL
BASOPHILS NFR BLD: 0.5 %
BASOPHILS NFR BLD: 0.5 %
BILIRUB SERPL-MCNC: 2.8 MG/DL
BILIRUB SERPL-MCNC: 2.8 MG/DL
BILIRUB UR QL STRIP: NEGATIVE
BUN SERPL-MCNC: 41 MG/DL
BUN SERPL-MCNC: 42 MG/DL
CALCIUM SERPL-MCNC: 9 MG/DL
CALCIUM SERPL-MCNC: 9 MG/DL
CHLORIDE SERPL-SCNC: 103 MMOL/L
CHLORIDE SERPL-SCNC: 103 MMOL/L
CLARITY UR REFRACT.AUTO: CLEAR
CO2 SERPL-SCNC: 17 MMOL/L
CO2 SERPL-SCNC: 18 MMOL/L
COLOR UR AUTO: ABNORMAL
CREAT SERPL-MCNC: 1.2 MG/DL
CREAT SERPL-MCNC: 1.2 MG/DL
DIFFERENTIAL METHOD: ABNORMAL
DIFFERENTIAL METHOD: ABNORMAL
EOSINOPHIL # BLD AUTO: 0 K/UL
EOSINOPHIL # BLD AUTO: 0 K/UL
EOSINOPHIL NFR BLD: 0.5 %
EOSINOPHIL NFR BLD: 1.6 %
ERYTHROCYTE [DISTWIDTH] IN BLOOD BY AUTOMATED COUNT: 20 %
ERYTHROCYTE [DISTWIDTH] IN BLOOD BY AUTOMATED COUNT: 20.2 %
EST. GFR  (AFRICAN AMERICAN): 53.3 ML/MIN/1.73 M^2
EST. GFR  (AFRICAN AMERICAN): 53.3 ML/MIN/1.73 M^2
EST. GFR  (NON AFRICAN AMERICAN): 46.2 ML/MIN/1.73 M^2
EST. GFR  (NON AFRICAN AMERICAN): 46.2 ML/MIN/1.73 M^2
GLUCOSE SERPL-MCNC: 286 MG/DL
GLUCOSE SERPL-MCNC: 288 MG/DL
GLUCOSE UR QL STRIP: ABNORMAL
HCT VFR BLD AUTO: 27.9 %
HCT VFR BLD AUTO: 28.1 %
HGB BLD-MCNC: 9.2 G/DL
HGB BLD-MCNC: 9.5 G/DL
HGB UR QL STRIP: NEGATIVE
HYPOCHROMIA BLD QL SMEAR: ABNORMAL
HYPOCHROMIA BLD QL SMEAR: ABNORMAL
IMM GRANULOCYTES # BLD AUTO: 0.01 K/UL
IMM GRANULOCYTES # BLD AUTO: 0.01 K/UL
IMM GRANULOCYTES NFR BLD AUTO: 0.5 %
IMM GRANULOCYTES NFR BLD AUTO: 0.5 %
INR PPP: 1.1
KETONES UR QL STRIP: NEGATIVE
LEUKOCYTE ESTERASE UR QL STRIP: ABNORMAL
LYMPHOCYTES # BLD AUTO: 0.5 K/UL
LYMPHOCYTES # BLD AUTO: 0.5 K/UL
LYMPHOCYTES NFR BLD: 25.8 %
LYMPHOCYTES NFR BLD: 26.2 %
MAGNESIUM SERPL-MCNC: 2.2 MG/DL
MAGNESIUM SERPL-MCNC: 2.2 MG/DL
MCH RBC QN AUTO: 30.4 PG
MCH RBC QN AUTO: 31.1 PG
MCHC RBC AUTO-ENTMCNC: 32.7 G/DL
MCHC RBC AUTO-ENTMCNC: 34.1 G/DL
MCV RBC AUTO: 92 FL
MCV RBC AUTO: 93 FL
MICROSCOPIC COMMENT: ABNORMAL
MONOCYTES # BLD AUTO: 0.4 K/UL
MONOCYTES # BLD AUTO: 0.4 K/UL
MONOCYTES NFR BLD: 21 %
MONOCYTES NFR BLD: 22.4 %
NEUTROPHILS # BLD AUTO: 0.9 K/UL
NEUTROPHILS # BLD AUTO: 1 K/UL
NEUTROPHILS NFR BLD: 48.8 %
NEUTROPHILS NFR BLD: 51.7 %
NITRITE UR QL STRIP: NEGATIVE
NRBC BLD-RTO: 0 /100 WBC
NRBC BLD-RTO: 0 /100 WBC
PH UR STRIP: 6 [PH] (ref 5–8)
PHOSPHATE SERPL-MCNC: 2.8 MG/DL
PHOSPHATE SERPL-MCNC: 2.9 MG/DL
PLATELET # BLD AUTO: 50 K/UL
PLATELET # BLD AUTO: 50 K/UL
PLATELET BLD QL SMEAR: ABNORMAL
PLATELET BLD QL SMEAR: ABNORMAL
PMV BLD AUTO: 11.1 FL
PMV BLD AUTO: 11.3 FL
POCT GLUCOSE: 110 MG/DL (ref 70–110)
POCT GLUCOSE: 130 MG/DL (ref 70–110)
POCT GLUCOSE: 156 MG/DL (ref 70–110)
POCT GLUCOSE: 194 MG/DL (ref 70–110)
POCT GLUCOSE: 213 MG/DL (ref 70–110)
POLYCHROMASIA BLD QL SMEAR: ABNORMAL
POLYCHROMASIA BLD QL SMEAR: ABNORMAL
POTASSIUM SERPL-SCNC: 3.5 MMOL/L
POTASSIUM SERPL-SCNC: 3.5 MMOL/L
PROT SERPL-MCNC: 5.7 G/DL
PROT SERPL-MCNC: 5.7 G/DL
PROT UR QL STRIP: NEGATIVE
PROTHROMBIN TIME: 11.4 SEC
RBC # BLD AUTO: 3.03 M/UL
RBC # BLD AUTO: 3.05 M/UL
RBC #/AREA URNS AUTO: 1 /HPF (ref 0–4)
SODIUM SERPL-SCNC: 130 MMOL/L
SODIUM SERPL-SCNC: 131 MMOL/L
SP GR UR STRIP: 1.02 (ref 1–1.03)
SQUAMOUS #/AREA URNS AUTO: 5 /HPF
URN SPEC COLLECT METH UR: ABNORMAL
UROBILINOGEN UR STRIP-ACNC: 2 EU/DL
WBC # BLD AUTO: 1.83 K/UL
WBC # BLD AUTO: 1.86 K/UL
WBC #/AREA URNS AUTO: 30 /HPF (ref 0–5)

## 2018-08-18 PROCEDURE — 63600175 PHARM REV CODE 636 W HCPCS: Mod: NTX | Performed by: HOSPITALIST

## 2018-08-18 PROCEDURE — 80053 COMPREHEN METABOLIC PANEL: CPT | Mod: NTX

## 2018-08-18 PROCEDURE — 83735 ASSAY OF MAGNESIUM: CPT | Mod: 91,NTX

## 2018-08-18 PROCEDURE — 80053 COMPREHEN METABOLIC PANEL: CPT | Mod: 91,NTX

## 2018-08-18 PROCEDURE — 84100 ASSAY OF PHOSPHORUS: CPT | Mod: 91,NTX

## 2018-08-18 PROCEDURE — 85025 COMPLETE CBC W/AUTO DIFF WBC: CPT | Mod: NTX

## 2018-08-18 PROCEDURE — 25000003 PHARM REV CODE 250: Mod: NTX | Performed by: HOSPITALIST

## 2018-08-18 PROCEDURE — 36415 COLL VENOUS BLD VENIPUNCTURE: CPT | Mod: NTX

## 2018-08-18 PROCEDURE — 20600001 HC STEP DOWN PRIVATE ROOM: Mod: NTX

## 2018-08-18 PROCEDURE — 85610 PROTHROMBIN TIME: CPT | Mod: NTX

## 2018-08-18 PROCEDURE — 99233 SBSQ HOSP IP/OBS HIGH 50: CPT | Mod: NTX,,, | Performed by: HOSPITALIST

## 2018-08-18 RX ORDER — LACTULOSE 10 G/15ML
10 SOLUTION ORAL 3 TIMES DAILY
Status: DISCONTINUED | OUTPATIENT
Start: 2018-08-18 | End: 2018-08-19

## 2018-08-18 RX ORDER — ENOXAPARIN SODIUM 100 MG/ML
50 INJECTION SUBCUTANEOUS
Status: DISCONTINUED | OUTPATIENT
Start: 2018-08-18 | End: 2018-08-20

## 2018-08-18 RX ORDER — CIPROFLOXACIN 500 MG/1
500 TABLET ORAL EVERY 12 HOURS
Status: DISCONTINUED | OUTPATIENT
Start: 2018-08-18 | End: 2018-08-18

## 2018-08-18 RX ORDER — ENTECAVIR 0.5 MG/1
0.5 TABLET, FILM COATED ORAL DAILY
Status: DISCONTINUED | OUTPATIENT
Start: 2018-08-19 | End: 2018-08-20

## 2018-08-18 RX ORDER — CIPROFLOXACIN 500 MG/1
500 TABLET ORAL EVERY 12 HOURS
Status: DISCONTINUED | OUTPATIENT
Start: 2018-08-18 | End: 2018-08-20

## 2018-08-18 RX ORDER — PROPRANOLOL HYDROCHLORIDE 60 MG/1
60 TABLET ORAL DAILY
Status: ON HOLD | COMMUNITY
End: 2018-08-24 | Stop reason: HOSPADM

## 2018-08-18 RX ADMIN — SODIUM BICARBONATE 650 MG TABLET 1300 MG: at 02:08

## 2018-08-18 RX ADMIN — ENTECAVIR 0.5 MG: 0.5 TABLET, FILM COATED ORAL at 08:08

## 2018-08-18 RX ADMIN — LACTULOSE 30 G: 20 SOLUTION ORAL at 02:08

## 2018-08-18 RX ADMIN — MIDODRINE HYDROCHLORIDE 10 MG: 5 TABLET ORAL at 02:08

## 2018-08-18 RX ADMIN — ENOXAPARIN SODIUM 50 MG: 100 INJECTION SUBCUTANEOUS at 11:08

## 2018-08-18 RX ADMIN — POTASSIUM BICARBONATE 25 MEQ: 25 TABLET, EFFERVESCENT ORAL at 11:08

## 2018-08-18 RX ADMIN — CIPROFLOXACIN 500 MG: 500 TABLET, FILM COATED ORAL at 10:08

## 2018-08-18 RX ADMIN — INSULIN ASPART 12 UNITS: 100 INJECTION, SOLUTION INTRAVENOUS; SUBCUTANEOUS at 04:08

## 2018-08-18 RX ADMIN — INSULIN ASPART 12 UNITS: 100 INJECTION, SOLUTION INTRAVENOUS; SUBCUTANEOUS at 11:08

## 2018-08-18 RX ADMIN — MIDODRINE HYDROCHLORIDE 10 MG: 5 TABLET ORAL at 10:08

## 2018-08-18 RX ADMIN — MIDODRINE HYDROCHLORIDE 10 MG: 5 TABLET ORAL at 08:08

## 2018-08-18 RX ADMIN — INSULIN ASPART 12 UNITS: 100 INJECTION, SOLUTION INTRAVENOUS; SUBCUTANEOUS at 08:08

## 2018-08-18 RX ADMIN — SODIUM BICARBONATE 650 MG TABLET 1300 MG: at 10:08

## 2018-08-18 RX ADMIN — LACTULOSE 30 G: 20 SOLUTION ORAL at 08:08

## 2018-08-18 RX ADMIN — SODIUM BICARBONATE 650 MG TABLET 1300 MG: at 08:08

## 2018-08-18 NOTE — PLAN OF CARE
Problem: Patient Care Overview  Goal: Plan of Care Review  Outcome: Ongoing (interventions implemented as appropriate)  No acute changes over shift. D/C'd lactulose as pt is having many loose BM's, Najul aware. VSS through outshift, midodrine for BP's. FS with 12 units of insulin as ordered with meals. Boost supplements at bedside.  was into see pt this afternoon, pt nor family has no needs at this time. Pt went outside per son and wheelchair today and tolerated well. No other needs or wants at this time. Bedside table and call light is within reach. WCTM.

## 2018-08-18 NOTE — PROGRESS NOTES
Hospital Medicine  Progress note    Team: [facility] Cristobal Rodrigues MD  Admit Date: 8/14/2018  AJIT 8/21/2018  Code status: Full Code    Principal Problem:  Hepatic encephalopathy    Interval hx: Patient seen and examined at bedside, no acute events overnight. ESBL detected in the urine,will start Ciprofloxacin and Contact precautions.     ROS   Review of Systems   Constitutional: Negative for chills and fever.   HENT: Negative for ear discharge.    Eyes: Negative for blurred vision and double vision.   Cardiovascular: Negative for chest pain.   Gastrointestinal: Negative for nausea and vomiting.        Abdominal distention    Genitourinary: Negative for dysuria and urgency.   Musculoskeletal: Negative.    Skin: Negative for rash.   Neurological: Negative for tremors, sensory change and speech change.   Psychiatric/Behavioral: Negative.          PEx  Temp:  [97.5 °F (36.4 °C)-98.6 °F (37 °C)]   Pulse:  []   Resp:  [16-20]   BP: ()/(37-57)   SpO2:  [96 %-100 %]   No intake or output data in the 24 hours ending 08/18/18 1542  Physical Exam   Constitutional: She is oriented to person, place, and time and well-developed, well-nourished, and in no distress.   HENT:   Head: Normocephalic and atraumatic.   Eyes: Pupils are equal, round, and reactive to light.   Neck: Normal range of motion. Neck supple. No thyromegaly present.   Cardiovascular: Normal rate, regular rhythm and normal heart sounds.   Pulmonary/Chest: Effort normal and breath sounds normal.   Abdominal: Soft. Bowel sounds are normal. She exhibits distension. There is no tenderness. There is no guarding.   Musculoskeletal: Normal range of motion.   Neurological: She is alert and oriented to person, place, and time.   Skin: Skin is warm and dry. No rash noted. No erythema.     Recent Labs   Lab  08/17/18   0806  08/17/18   2118  08/18/18   0857   WBC  2.09*  1.93*  1.86*  1.83*   HGB  8.6*  8.6*  9.2*  9.5*   HCT  25.5*  25.6*  28.1*  27.9*    PLT  49*  47*  50*  50*     Recent Labs   Lab  08/14/18   1149   08/16/18   0214  08/17/18   0333  08/18/18   0857   NA  124*   < >  129*  130*  131*  130*   K  3.5   < >  3.6  3.9  3.5  3.5   CL  100   < >  105  105  103  103   CO2  12*   < >  13*  13*  17*  18*   BUN  69*   < >  62*  50*  42*  41*   CREATININE  1.4   < >  1.4  1.0  1.2  1.2   GLU  289*   < >  271*  192*  288*  286*   CALCIUM  9.5   < >  8.8  9.0  9.0  9.0   MG  2.7*   < >  2.4  2.3  2.2  2.2   PHOS   --    < >  2.9  2.8  2.9  2.8   LIPASE  196*   --    --    --    --     < > = values in this interval not displayed.     Recent Labs   Lab  08/16/18   0214  08/17/18   0333  08/18/18   0857   ALKPHOS  443*  392*  470*  473*   ALT  122*  92*  115*  115*   AST  149*  105*  169*  168*   ALBUMIN  4.0  4.2  3.9  3.9   PROT  5.6*  5.7*  5.7*  5.7*   BILITOT  1.8*  3.8*  2.8*  2.8*   INR  1.2  1.1  1.1      Recent Labs   Lab  08/17/18   1604  08/17/18   1734  08/17/18   2055  08/18/18   0724  08/18/18   1036  08/18/18   1133   POCTGLUCOSE  157*  167*  211*  110  213*  194*     No results for input(s): CPK, CPKMB, MB, TROPONINI in the last 72 hours.    Scheduled Meds:   ciprofloxacin HCl  500 mg Oral Q12H    enoxaparin  50 mg Subcutaneous Q24H    [START ON 8/19/2018] entecavir  0.5 mg Oral Daily    insulin aspart U-100  12 Units Subcutaneous TIDWM    insulin detemir U-100  20 Units Subcutaneous QHS    lactulose  30 g Oral TID    midodrine  10 mg Oral TID    sodium bicarbonate  1,300 mg Oral TID     Continuous Infusions:  As Needed:  sodium chloride, dextrose 50%, dextrose 50%, glucagon (human recombinant), glucose, glucose, insulin aspart U-100, sodium chloride 0.9%    Active Hospital Problems    Diagnosis  POA    *Hepatic encephalopathy [K72.90]  Yes     Priority: 1 - High    Pre-transplant evaluation for liver transplant [Z01.818]  Not Applicable    Severe malnutrition [E43]  Yes    Anemia requiring transfusions [D64.9]  No     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 [B18.0, K74.60]  Yes    Type 2 diabetes mellitus, with long-term current use of insulin [E11.9, Z79.4]  Not Applicable    Esophageal varices without bleeding [I85.00]  Yes    Liver transplant candidate [Z76.82]  Not Applicable      Resolved Hospital Problems   No resolved problems to display.       Overview    69 yo F with with above mentioned medical history presents with confusion and decompensated liver cirrhosis, admitted for expedited liver transplant evaluation    Assessment and Plan for Problems addressed today:    Hepatic Encephalopathy  - ammonia levels high at 67  - Started on PO Lactulose, one time rectal lactulose given in the ER  - AAO X 3      Decompensated Liver cirrhosis due to Hep B And D  MELD-Na score: 18 at 8/18/2018  8:57 AM  MELD score: 13 at 8/18/2018  8:57 AM  Calculated from:  Serum Creatinine: 1.2 mg/dL at 8/18/2018  8:57 AM  Serum Sodium: 131 mmol/L at 8/18/2018  8:57 AM  Total Bilirubin: 2.8 mg/dL at 8/18/2018  8:57 AM  INR(ratio): 1.1 at 8/18/2018  8:57 AM  Age: 68 years  - Hold beta blockers due to hypotension  - IR paracentesis negative for SBP, d/c ceftriaxone   - UA/Urine culture  - blood cultures NGTD  - Fluid restriction  - US liver doppler  - Hepatology consult  - AFP 2.9  - Lactic acid 2.5 on admission, now 2.0  - PETH pending  - EGD on 08/17 negative for active bleeding, Portal HTN gastropathy and Grade I varices    ESBL UTI   - contact precautions  - Started on PO Ciprofloxacin  - will need to repeat Urine culture    Anemia requiring blood transfusions  - transfused one unit of PRBCs 8/16, responded well   - Hgb 9.6, monitor daily CBC     Metabolic acidosis  - VBG: metabolic acidosis with respiratory alkalosis, normal pH  - start sodium bicarbonate tablets TID     Diabetes type 2  - POCT q ACHS  - low dose sliding scale   - HgbA1c 6.8  - cont insulin at night, 20 units, 10 units TID      Hyponatremia  - most likely  hypervolemic  - fluid restriction  - Urine sodium  - 124-- 131--128-->130     Portal Vein thrombosis  - US liver doppler: no evidence of thrombus   - restart Lovenox daily      Diet:  Low sodium  DVT PPx:    Anticoagulants   Medication Route Frequency    enoxaparin injection 50 mg Subcutaneous Q24H       Goals of Care: Full code    Discharge plan and follow up  Pending resolution of bleeding and liver transplant workup    Provider    Cristobal Melendez MD  Staff Hospitalist  Department of Hospital Medicine  [facility]   [phone]

## 2018-08-18 NOTE — PLAN OF CARE
Problem: Patient Care Overview  Goal: Plan of Care Review  Outcome: Ongoing (interventions implemented as appropriate)  Pt AAOx4, afebrile, free of falls. Pt's WBC declined to 1.93, VASU Desai notified. New labs collected and UA sent. Neutropenic precautions initiated. Education provided.  utilized, pt verbalized understanding of POC at that time. Pt denies complaints/distress. BP remains at current baseline. Pt rested relatively well overnight. Stress test still pending. WCTM.

## 2018-08-18 NOTE — TREATMENT PLAN
Hepatology Treatment Plan  08/18/2018  12:02 PM    Chart reviewed.  Bilirubin trending down but otherwise ALKP, AST, ALT stable.   Would recommend reviewing GFR (as it has slightly improved) in order to see if Entecavir dose can be increased.  We will continue to follow alongside primary team.    Michelle Leone M.D.  Gastroenterology Fellow, PGY-V  Pager: [phone]  [facility]

## 2018-08-19 PROBLEM — Z01.818 PRE-TRANSPLANT EVALUATION FOR CHRONIC LIVER DISEASE: Status: ACTIVE | Noted: 2018-08-19

## 2018-08-19 LAB
ALBUMIN SERPL BCP-MCNC: 3.3 G/DL
ALP SERPL-CCNC: 464 U/L
ALT SERPL W/O P-5'-P-CCNC: 133 U/L
ANION GAP SERPL CALC-SCNC: 9 MMOL/L
ANISOCYTOSIS BLD QL SMEAR: SLIGHT
AST SERPL-CCNC: 229 U/L
BACTERIA BLD CULT: NORMAL
BACTERIA BLD CULT: NORMAL
BASOPHILS # BLD AUTO: 0.01 K/UL
BASOPHILS NFR BLD: 0.6 %
BILIRUB SERPL-MCNC: 2 MG/DL
BUN SERPL-MCNC: 48 MG/DL
BURR CELLS BLD QL SMEAR: ABNORMAL
CALCIUM SERPL-MCNC: 8.3 MG/DL
CHLORIDE SERPL-SCNC: 102 MMOL/L
CO2 SERPL-SCNC: 17 MMOL/L
CREAT SERPL-MCNC: 1 MG/DL
DIFFERENTIAL METHOD: ABNORMAL
EOSINOPHIL # BLD AUTO: 0 K/UL
EOSINOPHIL NFR BLD: 1.3 %
ERYTHROCYTE [DISTWIDTH] IN BLOOD BY AUTOMATED COUNT: 19.5 %
EST. GFR  (AFRICAN AMERICAN): >60 ML/MIN/1.73 M^2
EST. GFR  (NON AFRICAN AMERICAN): 57.6 ML/MIN/1.73 M^2
GLUCOSE SERPL-MCNC: 145 MG/DL
HCT VFR BLD AUTO: 25.1 %
HGB BLD-MCNC: 8.4 G/DL
HYPOCHROMIA BLD QL SMEAR: ABNORMAL
IMM GRANULOCYTES # BLD AUTO: 0.01 K/UL
IMM GRANULOCYTES NFR BLD AUTO: 0.6 %
INR PPP: 1.2
LYMPHOCYTES # BLD AUTO: 0.5 K/UL
LYMPHOCYTES NFR BLD: 32.3 %
MAGNESIUM SERPL-MCNC: 2.1 MG/DL
MCH RBC QN AUTO: 30.3 PG
MCHC RBC AUTO-ENTMCNC: 33.5 G/DL
MCV RBC AUTO: 91 FL
MONOCYTES # BLD AUTO: 0.4 K/UL
MONOCYTES NFR BLD: 23.4 %
NEUTROPHILS # BLD AUTO: 0.7 K/UL
NEUTROPHILS NFR BLD: 41.8 %
NRBC BLD-RTO: 0 /100 WBC
PHOSPHATE SERPL-MCNC: 3.2 MG/DL
PLATELET # BLD AUTO: 43 K/UL
PLATELET BLD QL SMEAR: ABNORMAL
PMV BLD AUTO: 11.3 FL
POCT GLUCOSE: 153 MG/DL (ref 70–110)
POCT GLUCOSE: 283 MG/DL (ref 70–110)
POCT GLUCOSE: 342 MG/DL (ref 70–110)
POCT GLUCOSE: 369 MG/DL (ref 70–110)
POIKILOCYTOSIS BLD QL SMEAR: SLIGHT
POTASSIUM SERPL-SCNC: 3.9 MMOL/L
PROT SERPL-MCNC: 5 G/DL
PROTHROMBIN TIME: 11.9 SEC
RBC # BLD AUTO: 2.77 M/UL
SODIUM SERPL-SCNC: 128 MMOL/L
WBC # BLD AUTO: 1.58 K/UL

## 2018-08-19 PROCEDURE — 99233 SBSQ HOSP IP/OBS HIGH 50: CPT | Mod: NTX,,, | Performed by: HOSPITALIST

## 2018-08-19 PROCEDURE — 25000003 PHARM REV CODE 250: Mod: NTX | Performed by: HOSPITALIST

## 2018-08-19 PROCEDURE — 20600001 HC STEP DOWN PRIVATE ROOM: Mod: NTX

## 2018-08-19 PROCEDURE — 85025 COMPLETE CBC W/AUTO DIFF WBC: CPT | Mod: NTX

## 2018-08-19 PROCEDURE — 63600175 PHARM REV CODE 636 W HCPCS: Mod: NTX | Performed by: HOSPITALIST

## 2018-08-19 PROCEDURE — 99231 SBSQ HOSP IP/OBS SF/LOW 25: CPT | Mod: NTX,,, | Performed by: INTERNAL MEDICINE

## 2018-08-19 PROCEDURE — 83735 ASSAY OF MAGNESIUM: CPT | Mod: NTX

## 2018-08-19 PROCEDURE — 85610 PROTHROMBIN TIME: CPT | Mod: NTX

## 2018-08-19 PROCEDURE — 84100 ASSAY OF PHOSPHORUS: CPT | Mod: NTX

## 2018-08-19 PROCEDURE — 80053 COMPREHEN METABOLIC PANEL: CPT | Mod: NTX

## 2018-08-19 PROCEDURE — 36415 COLL VENOUS BLD VENIPUNCTURE: CPT | Mod: NTX

## 2018-08-19 RX ORDER — LACTULOSE 10 G/15ML
30 SOLUTION ORAL ONCE
Status: COMPLETED | OUTPATIENT
Start: 2018-08-19 | End: 2018-08-19

## 2018-08-19 RX ORDER — LACTULOSE 10 G/15ML
30 SOLUTION ORAL 2 TIMES DAILY
Status: DISCONTINUED | OUTPATIENT
Start: 2018-08-20 | End: 2018-08-24 | Stop reason: HOSPADM

## 2018-08-19 RX ADMIN — SODIUM BICARBONATE 650 MG TABLET 1300 MG: at 08:08

## 2018-08-19 RX ADMIN — ENTECAVIR 0.5 MG: 0.5 TABLET, FILM COATED ORAL at 09:08

## 2018-08-19 RX ADMIN — VANCOMYCIN HYDROCHLORIDE 750 MG: 750 INJECTION, POWDER, LYOPHILIZED, FOR SOLUTION INTRAVENOUS at 12:08

## 2018-08-19 RX ADMIN — CIPROFLOXACIN 500 MG: 500 TABLET, FILM COATED ORAL at 08:08

## 2018-08-19 RX ADMIN — CIPROFLOXACIN 500 MG: 500 TABLET, FILM COATED ORAL at 09:08

## 2018-08-19 RX ADMIN — MIDODRINE HYDROCHLORIDE 10 MG: 5 TABLET ORAL at 03:08

## 2018-08-19 RX ADMIN — INSULIN ASPART 12 UNITS: 100 INJECTION, SOLUTION INTRAVENOUS; SUBCUTANEOUS at 06:08

## 2018-08-19 RX ADMIN — MIDODRINE HYDROCHLORIDE 10 MG: 5 TABLET ORAL at 08:08

## 2018-08-19 RX ADMIN — INSULIN ASPART 4 UNITS: 100 INJECTION, SOLUTION INTRAVENOUS; SUBCUTANEOUS at 12:08

## 2018-08-19 RX ADMIN — LACTULOSE 10 G: 20 SOLUTION ORAL at 03:08

## 2018-08-19 RX ADMIN — INSULIN ASPART 12 UNITS: 100 INJECTION, SOLUTION INTRAVENOUS; SUBCUTANEOUS at 12:08

## 2018-08-19 RX ADMIN — INSULIN ASPART 1 UNITS: 100 INJECTION, SOLUTION INTRAVENOUS; SUBCUTANEOUS at 09:08

## 2018-08-19 RX ADMIN — SODIUM BICARBONATE 650 MG TABLET 1300 MG: at 03:08

## 2018-08-19 RX ADMIN — LACTULOSE 30 G: 20 SOLUTION ORAL at 06:08

## 2018-08-19 RX ADMIN — LACTULOSE 10 G: 20 SOLUTION ORAL at 08:08

## 2018-08-19 RX ADMIN — ENOXAPARIN SODIUM 50 MG: 100 INJECTION SUBCUTANEOUS at 12:08

## 2018-08-19 RX ADMIN — SODIUM BICARBONATE 650 MG TABLET 1300 MG: at 09:08

## 2018-08-19 RX ADMIN — MIDODRINE HYDROCHLORIDE 10 MG: 5 TABLET ORAL at 09:08

## 2018-08-19 RX ADMIN — INSULIN ASPART 12 UNITS: 100 INJECTION, SOLUTION INTRAVENOUS; SUBCUTANEOUS at 08:08

## 2018-08-19 RX ADMIN — INSULIN ASPART 5 UNITS: 100 INJECTION, SOLUTION INTRAVENOUS; SUBCUTANEOUS at 06:08

## 2018-08-19 NOTE — PLAN OF CARE
Problem: Patient Care Overview  Goal: Plan of Care Review  Outcome: Ongoing (interventions implemented as appropriate)  POC reviewed w/patient and son. Son understanding instructions and questions. Pt. Answering some questions w/yes or no. Remains hypotensive. Midodrine adm as ordered. CM = SR/ST. Denies pain. Safety maintained. Bed in low and locked position. Call light within reach. Side rails up x2. Frequent rounds. Son has remained at bedside all shift.

## 2018-08-19 NOTE — SUBJECTIVE & OBJECTIVE
Interval History:   Overall stable, no acute events overnight.    Current Facility-Administered Medications   Medication    0.9%  NaCl infusion (for blood administration)    ciprofloxacin HCl tablet 500 mg    dextrose 50% injection 12.5 g    dextrose 50% injection 25 g    enoxaparin injection 50 mg    entecavir tablet 0.5 mg    glucagon (human recombinant) injection 1 mg    glucose chewable tablet 16 g    glucose chewable tablet 24 g    insulin aspart U-100 pen 0-5 Units    insulin aspart U-100 pen 12 Units    insulin detemir U-100 pen 20 Units    lactulose 20 gram/30 mL solution Soln 10 g    midodrine tablet 10 mg    sodium bicarbonate tablet 1,300 mg    sodium chloride 0.9% flush 3 mL       Objective:     Vital Signs (Most Recent):  Temp: 98.6 °F (37 °C) (08/19/18 0806)  Pulse: 103 (08/19/18 0806)  Resp: 18 (08/19/18 0806)  BP: (!) 91/52 (08/19/18 0806)  SpO2: 97 % (08/19/18 0806) Vital Signs (24h Range):  Temp:  [96.8 °F (36 °C)-99 °F (37.2 °C)] 98.6 °F (37 °C)  Pulse:  [] 103  Resp:  [16-18] 18  SpO2:  [96 %-100 %] 97 %  BP: ()/(52-57) 91/52     Weight: 55.2 kg (121 lb 9.6 oz) (08/18/18 0405)  Body mass index is 20.24 kg/m².    Physical Exam   Constitutional: She is oriented to person, place, and time. No distress.   HENT:   Head: Normocephalic and atraumatic.   Eyes: No scleral icterus.   Cardiovascular:   Sinus tachycardia     Pulmonary/Chest: Effort normal and breath sounds normal.   Abdominal:   Minimal abdominal distention with no tenderness   Musculoskeletal: She exhibits no edema.   Neurological: She is alert and oriented to person, place, and time.   Skin: She is not diaphoretic.       MELD-Na score: 11 at 8/19/2018  3:58 AM  MELD score: 11 at 8/19/2018  3:58 AM  Calculated from:  Serum Creatinine: 1 mg/dL at 8/19/2018  3:58 AM  Serum Sodium: 128 mmol/L at 8/19/2018  3:58 AM  Total Bilirubin: 2 mg/dL at 8/19/2018  3:58 AM  INR(ratio): 1.2 at 8/19/2018  3:58 AM  Age: 68  years    Significant Labs:  CBC:   Recent Labs   Lab  08/19/18 0358   WBC  1.58*   RBC  2.77*   HGB  8.4*   HCT  25.1*   PLT  43*     CMP:   Recent Labs   Lab  08/19/18 0358   GLU  145*   CALCIUM  8.3*   ALBUMIN  3.3*   PROT  5.0*   NA  128*   K  3.9   CO2  17*   CL  102   BUN  48*   CREATININE  1.0   ALKPHOS  464*   ALT  133*   AST  229*   BILITOT  2.0*     Coagulation:   Recent Labs   Lab  08/19/18 0358   INR  1.2       Significant Imaging:  X-Ray: Reviewed  US: Reviewed  MRI: Reviewed

## 2018-08-19 NOTE — PLAN OF CARE
Problem: Patient Care Overview  Goal: Plan of Care Review  Outcome: Ongoing (interventions implemented as appropriate)  VSS.  @ bedside. 1 BM today. Pt will be NPO after MN for the stress test. No issues today. Will continue to monitor pt.

## 2018-08-19 NOTE — ASSESSMENT & PLAN NOTE
68 year old female with past medical history of Hep B and D cirrhosis on Entecavir c/b EV with bleeding, HE, PV thrombosis, and refractory ascites. Patient admitted for HE and is currently undergoing liver transplant evaluation.     Recommendations:  --Daily CMP, CBC, and INR  --Low salt diet  --Continue Lactulose (titrate to 3-5 BM in 24 hours)  --Continue Midodrine at current dose  --Continue Entecavir at current dose

## 2018-08-19 NOTE — PROGRESS NOTES
Hospital Medicine  Progress note    Team: [facility] Cristobal Rodrigues MD  Admit Date: 8/14/2018  AJIT 8/21/2018  Code status: Full Code    Principal Problem:  Hepatic encephalopathy    Interval hx: Patient seen and examined at bedside, no acute events overnight. IV Vancomycin added for ESBL coverage. Dobutamine stress test tomorrow.       ROS   Review of Systems   Constitutional: Negative for chills and fever.   HENT: Negative for ear discharge.    Eyes: Negative for blurred vision and double vision.   Cardiovascular: Negative for chest pain.   Gastrointestinal: Negative for nausea and vomiting.        Abdominal distention    Genitourinary: Negative for dysuria and urgency.   Musculoskeletal: Negative.    Skin: Negative for rash.   Neurological: Negative for tremors, sensory change and speech change.   Psychiatric/Behavioral: Negative.          PEx  Temp:  [96.8 °F (36 °C)-99 °F (37.2 °C)]   Pulse:  []   Resp:  [16-20]   BP: ()/(51-54)   SpO2:  [95 %-99 %]     Intake/Output Summary (Last 24 hours) at 8/19/2018 1642  Last data filed at 8/19/2018 0638  Gross per 24 hour   Intake 240 ml   Output --   Net 240 ml     Physical Exam   Constitutional: She is oriented to person, place, and time and well-developed, well-nourished, and in no distress.   HENT:   Head: Normocephalic and atraumatic.   Eyes: Pupils are equal, round, and reactive to light.   Neck: Normal range of motion. Neck supple. No thyromegaly present.   Cardiovascular: Normal rate, regular rhythm and normal heart sounds.   Pulmonary/Chest: Effort normal and breath sounds normal.   Abdominal: Soft. Bowel sounds are normal. She exhibits distension. There is no tenderness. There is no guarding.   Musculoskeletal: Normal range of motion.   Neurological: She is alert and oriented to person, place, and time.   Skin: Skin is warm and dry. No rash noted. No erythema.     Recent Labs   Lab  08/17/18   2118  08/18/18   0857  08/19/18   0358   WBC  1.93*   1.86*  1.83*  1.58*   HGB  8.6*  9.2*  9.5*  8.4*   HCT  25.6*  28.1*  27.9*  25.1*   PLT  47*  50*  50*  43*     Recent Labs   Lab  08/14/18   1149   08/17/18   0333  08/18/18   0857  08/19/18   0358   NA  124*   < >  130*  131*  130*  128*   K  3.5   < >  3.9  3.5  3.5  3.9   CL  100   < >  105  103  103  102   CO2  12*   < >  13*  17*  18*  17*   BUN  69*   < >  50*  42*  41*  48*   CREATININE  1.4   < >  1.0  1.2  1.2  1.0   GLU  289*   < >  192*  288*  286*  145*   CALCIUM  9.5   < >  9.0  9.0  9.0  8.3*   MG  2.7*   < >  2.3  2.2  2.2  2.1   PHOS   --    < >  2.8  2.9  2.8  3.2   LIPASE  196*   --    --    --    --     < > = values in this interval not displayed.     Recent Labs   Lab  08/17/18   0333  08/18/18   0857  08/19/18   0358   ALKPHOS  392*  470*  473*  464*   ALT  92*  115*  115*  133*   AST  105*  169*  168*  229*   ALBUMIN  4.2  3.9  3.9  3.3*   PROT  5.7*  5.7*  5.7*  5.0*   BILITOT  3.8*  2.8*  2.8*  2.0*   INR  1.1  1.1  1.2      Recent Labs   Lab  08/18/18   1036  08/18/18   1133  08/18/18   1619  08/18/18   2151  08/19/18   0815  08/19/18   1218   POCTGLUCOSE  213*  194*  130*  156*  153*  342*     No results for input(s): CPK, CPKMB, MB, TROPONINI in the last 72 hours.    Scheduled Meds:   ciprofloxacin HCl  500 mg Oral Q12H    enoxaparin  50 mg Subcutaneous Q24H    entecavir  0.5 mg Oral Daily    insulin aspart U-100  12 Units Subcutaneous TIDWM    insulin detemir U-100  20 Units Subcutaneous QHS    lactulose  10 g Oral TID    midodrine  10 mg Oral TID    sodium bicarbonate  1,300 mg Oral TID    vancomycin (VANCOCIN) IVPB  1,000 mg Intravenous Q12H     Continuous Infusions:  As Needed:  sodium chloride, dextrose 50%, dextrose 50%, glucagon (human recombinant), glucose, glucose, insulin aspart U-100, sodium chloride 0.9%    Active Hospital Problems    Diagnosis  POA    *Hepatic encephalopathy [K72.90]  Yes     Priority: 1 - High    Pre-transplant evaluation for  liver transplant [Z01.818]  Not Applicable    Severe malnutrition [E43]  Yes    Anemia requiring transfusions [D64.9]  No    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 [B18.0, K74.60]  Yes    Type 2 diabetes mellitus, with long-term current use of insulin [E11.9, Z79.4]  Not Applicable    Esophageal varices without bleeding [I85.00]  Yes    Liver transplant candidate [Z76.82]  Not Applicable      Resolved Hospital Problems   No resolved problems to display.       Overview    67 yo F with with above mentioned medical history presents with confusion and decompensated liver cirrhosis, admitted for expedited liver transplant evaluation    Assessment and Plan for Problems addressed today:    Hepatic Encephalopathy  - ammonia levels high at 67 on admission  - Started on PO Lactulose, one time rectal lactulose given in the ER  - AAO X 3      Decompensated Liver cirrhosis due to Hep B And D  MELD-Na score: 11 at 8/19/2018  3:58 AM  MELD score: 11 at 8/19/2018  3:58 AM  Calculated from:  Serum Creatinine: 1 mg/dL at 8/19/2018  3:58 AM  Serum Sodium: 128 mmol/L at 8/19/2018  3:58 AM  Total Bilirubin: 2 mg/dL at 8/19/2018  3:58 AM  INR(ratio): 1.2 at 8/19/2018  3:58 AM  Age: 68 years  - Hold beta blockers due to hypotension  - IR paracentesis negative for SBP, d/c ceftriaxone   - UA/Urine culture  - blood cultures NGTD  - Fluid restriction  - US liver doppler  - Hepatology consult  - AFP 2.9  - Lactic acid 2.5 on admission, now 2.0  - PETH pending  - EGD on 08/17 negative for active bleeding, Portal HTN gastropathy and Grade I varices    ESBL/E faecium UTI   - contact precautions  - Started on PO Ciprofloxacin and IV Vancomycin  - will need to repeat Urine culture    Anemia requiring blood transfusions  - transfused one unit of PRBCs 8/16, responded well   - Hgb 9.6, monitor daily CBC     Metabolic acidosis  - VBG: metabolic acidosis with respiratory alkalosis, normal pH  - start sodium bicarbonate tablets  TID     Diabetes type 2  - POCT q ACHS  - low dose sliding scale   - HgbA1c 6.8  - cont insulin at night, 20 units, 10 units TID      Hyponatremia  - most likely hypervolemic  - fluid restriction  - Urine sodium  - 124-- 131--128-->130     Portal Vein thrombosis  - US liver doppler: no evidence of thrombus   - restart Lovenox daily      Diet:  Low sodium  DVT PPx:    Anticoagulants   Medication Route Frequency    enoxaparin injection 50 mg Subcutaneous Q24H       Goals of Care: Full code    Discharge plan and follow up: pending Liver transplant evaluation     Provider    Cristobal Melendez MD  Staff Hospitalist  Department of Hospital Medicine  [facility]   [phone]

## 2018-08-19 NOTE — ANESTHESIA POSTPROCEDURE EVALUATION
"Anesthesia Post Evaluation    Patient: Scarlett Reddy    Procedure(s) Performed: Procedure(s) (LRB):  EGD (ESOPHAGOGASTRODUODENOSCOPY) (N/A)    Final Anesthesia Type: general  Patient location during evaluation: PACU  Patient participation: Yes- Able to Participate  Level of consciousness: awake and alert  Post-procedure vital signs: reviewed and stable  Pain management: adequate  Airway patency: patent  PONV status at discharge: No PONV  Anesthetic complications: no      Cardiovascular status: blood pressure returned to baseline  Respiratory status: unassisted  Hydration status: euvolemic  Follow-up not needed.        Visit Vitals  BP (!) 92/54 (BP Location: Right arm, Patient Position: Lying)   Pulse (!) 111   Temp 37.1 °C (98.7 °F) (Oral)   Resp 16   Ht 5' 5" (1.651 m)   Wt 55.2 kg (121 lb 9.6 oz)   SpO2 96%   Breastfeeding? No   BMI 20.24 kg/m²       Pain/Annette Score: Pain Assessment Performed: Yes (8/19/2018  3:07 AM)  Presence of Pain: denies (8/19/2018  3:07 AM)        "

## 2018-08-19 NOTE — ADDENDUM NOTE
Addendum  created 08/19/18 0753 by Martin Mclain MD    Review and Sign - Ready for Procedure, Sign clinical note

## 2018-08-19 NOTE — PROGRESS NOTES
[facility]  Hepatology  Progress Note    Patient Name: Scarlett Reddy  MRN: [id number]  Admission Date: 8/14/2018  Hospital Length of Stay: 5 days  Attending Provider: Cristobal Rodrigues MD   Primary Care Physician: Primary Doctor No  Principal Problem:Hepatic encephalopathy    Subjective:     Transplant status: No    HPI: Scarlett Reddy is a 67 y/o female with past medical history of Hep B and D cirrhosis, on entecavir with undetectable Hep B DNA, c/b EV with bleeding, HE, PV thrombosis, and refractory ascites, T2DM, who presented as a transfer from [facility] for evaluation of liver transplant.     Per records, the patient has been declining recently with hyponatremia due to diuretics (Na of 121), that improvement to 129 with holding diuresis, but now requiring paracentesis every 7-10 days.   She presented to a hospital in [facility] for progressive weakness, found to have worsening hyponatremia.  Per son report, the patient declined rapidly at the end of the trip to [place], and had to be transported by EMS.    Patient today states that she is feeling well .She states that she does not remember the details of her trip. She states that she had a paracentesis just before flying to [place]. She denies f/c, denies N/V, deines abdominal pain. Endorses abdominal swelling. Denies melena or hematemesis    The patient notes she was active and independent prior to these event. She denies alcohol use.     Labs on presentation are notable for PLT of 85, Na 124, Cr 1.4, ALKP 658, Tbili 2.1, AST//160     Interval History:   Overall stable, no acute events overnight.    Current Facility-Administered Medications   Medication    0.9%  NaCl infusion (for blood administration)    ciprofloxacin HCl tablet 500 mg    dextrose 50% injection 12.5 g    dextrose 50% injection 25 g    enoxaparin injection 50 mg    entecavir tablet 0.5 mg    glucagon (human recombinant) injection 1 mg    glucose chewable tablet 16  g    glucose chewable tablet 24 g    insulin aspart U-100 pen 0-5 Units    insulin aspart U-100 pen 12 Units    insulin detemir U-100 pen 20 Units    lactulose 20 gram/30 mL solution Soln 10 g    midodrine tablet 10 mg    sodium bicarbonate tablet 1,300 mg    sodium chloride 0.9% flush 3 mL       Objective:     Vital Signs (Most Recent):  Temp: 98.6 °F (37 °C) (08/19/18 0806)  Pulse: 103 (08/19/18 0806)  Resp: 18 (08/19/18 0806)  BP: (!) 91/52 (08/19/18 0806)  SpO2: 97 % (08/19/18 0806) Vital Signs (24h Range):  Temp:  [96.8 °F (36 °C)-99 °F (37.2 °C)] 98.6 °F (37 °C)  Pulse:  [] 103  Resp:  [16-18] 18  SpO2:  [96 %-100 %] 97 %  BP: ()/(52-57) 91/52     Weight: 55.2 kg (121 lb 9.6 oz) (08/18/18 0405)  Body mass index is 20.24 kg/m².    Physical Exam   Constitutional: She is oriented to person, place, and time. No distress.   HENT:   Head: Normocephalic and atraumatic.   Eyes: No scleral icterus.   Cardiovascular:   Sinus tachycardia     Pulmonary/Chest: Effort normal and breath sounds normal.   Abdominal:   Minimal abdominal distention with no tenderness   Musculoskeletal: She exhibits no edema.   Neurological: She is alert and oriented to person, place, and time.   Skin: She is not diaphoretic.       MELD-Na score: 11 at 8/19/2018  3:58 AM  MELD score: 11 at 8/19/2018  3:58 AM  Calculated from:  Serum Creatinine: 1 mg/dL at 8/19/2018  3:58 AM  Serum Sodium: 128 mmol/L at 8/19/2018  3:58 AM  Total Bilirubin: 2 mg/dL at 8/19/2018  3:58 AM  INR(ratio): 1.2 at 8/19/2018  3:58 AM  Age: 68 years    Significant Labs:  CBC:   Recent Labs   Lab  08/19/18   0358   WBC  1.58*   RBC  2.77*   HGB  8.4*   HCT  25.1*   PLT  43*     CMP:   Recent Labs   Lab  08/19/18   0358   GLU  145*   CALCIUM  8.3*   ALBUMIN  3.3*   PROT  5.0*   NA  128*   K  3.9   CO2  17*   CL  102   BUN  48*   CREATININE  1.0   ALKPHOS  464*   ALT  133*   AST  229*   BILITOT  2.0*     Coagulation:   Recent Labs   Lab  08/19/18   0358    INR  1.2       Significant Imaging:  X-Ray: Reviewed  US: Reviewed  MRI: Reviewed    Assessment/Plan:     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    68 year old female with past medical history of Hep B and D cirrhosis on Entecavir c/b EV with bleeding, HE, PV thrombosis, and refractory ascites. Patient admitted for HE and is currently undergoing liver transplant evaluation.     Recommendations:  --Daily CMP, CBC, and INR  --Low salt diet  --Continue Lactulose (titrate to 3-5 BM in 24 hours)  --Continue Midodrine at current dose  --Continue Entecavir at current dose            Thank you for your consult. I will follow-up with patient. Please contact us if you have any additional questions.    Michelle Leone M.D.  Gastroenterology Fellow, PGY-V  Pager: [phone]  [facility]

## 2018-08-20 ENCOUNTER — TELEPHONE (OUTPATIENT)
Dept: TRANSPLANT | Facility: CLINIC | Age: 68
End: 2018-08-20

## 2018-08-20 LAB
A1 AG RBC QL: NORMAL
ALBUMIN SERPL BCP-MCNC: 3.7 G/DL
ALP SERPL-CCNC: 480 U/L
ALT SERPL W/O P-5'-P-CCNC: 137 U/L
ANION GAP SERPL CALC-SCNC: 10 MMOL/L
ANISOCYTOSIS BLD QL SMEAR: SLIGHT
AST SERPL-CCNC: 209 U/L
BACTERIA SPEC ANAEROBE CULT: NORMAL
BACTERIA UR CULT: NORMAL
BACTERIA UR CULT: NORMAL
BASOPHILS # BLD AUTO: 0 K/UL
BASOPHILS # BLD AUTO: 0 K/UL
BASOPHILS NFR BLD: 0 %
BASOPHILS NFR BLD: 0 %
BILIRUB SERPL-MCNC: 2 MG/DL
BUN SERPL-MCNC: 45 MG/DL
CALCIUM SERPL-MCNC: 8.7 MG/DL
CHLORIDE SERPL-SCNC: 99 MMOL/L
CO2 SERPL-SCNC: 18 MMOL/L
CREAT SERPL-MCNC: 1.2 MG/DL
DIFFERENTIAL METHOD: ABNORMAL
DIFFERENTIAL METHOD: ABNORMAL
EOSINOPHIL # BLD AUTO: 0 K/UL
EOSINOPHIL # BLD AUTO: 0 K/UL
EOSINOPHIL NFR BLD: 0.5 %
EOSINOPHIL NFR BLD: 0.6 %
ERYTHROCYTE [DISTWIDTH] IN BLOOD BY AUTOMATED COUNT: 19.4 %
ERYTHROCYTE [DISTWIDTH] IN BLOOD BY AUTOMATED COUNT: 19.5 %
EST. GFR  (AFRICAN AMERICAN): 53.3 ML/MIN/1.73 M^2
EST. GFR  (NON AFRICAN AMERICAN): 46.2 ML/MIN/1.73 M^2
GLUCOSE SERPL-MCNC: 189 MG/DL
HCT VFR BLD AUTO: 29.4 %
HCT VFR BLD AUTO: 29.6 %
HGB BLD-MCNC: 10 G/DL
HGB BLD-MCNC: 10 G/DL
HYPOCHROMIA BLD QL SMEAR: ABNORMAL
IMM GRANULOCYTES # BLD AUTO: 0.01 K/UL
IMM GRANULOCYTES # BLD AUTO: 0.02 K/UL
IMM GRANULOCYTES NFR BLD AUTO: 0.6 %
IMM GRANULOCYTES NFR BLD AUTO: 1.1 %
INR PPP: 1.1
LACTATE SERPL-SCNC: 2.5 MMOL/L
LYMPHOCYTES # BLD AUTO: 0.4 K/UL
LYMPHOCYTES # BLD AUTO: 0.6 K/UL
LYMPHOCYTES NFR BLD: 25.3 %
LYMPHOCYTES NFR BLD: 30.3 %
MAGNESIUM SERPL-MCNC: 2 MG/DL
MCH RBC QN AUTO: 30.9 PG
MCH RBC QN AUTO: 31 PG
MCHC RBC AUTO-ENTMCNC: 33.8 G/DL
MCHC RBC AUTO-ENTMCNC: 34 G/DL
MCV RBC AUTO: 91 FL
MCV RBC AUTO: 91 FL
MONOCYTES # BLD AUTO: 0.3 K/UL
MONOCYTES # BLD AUTO: 0.3 K/UL
MONOCYTES NFR BLD: 17.6 %
MONOCYTES NFR BLD: 19 %
NEUTROPHILS # BLD AUTO: 1 K/UL
NEUTROPHILS # BLD AUTO: 1 K/UL
NEUTROPHILS NFR BLD: 50.5 %
NEUTROPHILS NFR BLD: 54.5 %
NRBC BLD-RTO: 0 /100 WBC
NRBC BLD-RTO: 0 /100 WBC
OVALOCYTES BLD QL SMEAR: ABNORMAL
PHOSPHATE SERPL-MCNC: 3 MG/DL
PLATELET # BLD AUTO: 43 K/UL
PLATELET # BLD AUTO: 48 K/UL
PLATELET BLD QL SMEAR: ABNORMAL
PLATELET BLD QL SMEAR: ABNORMAL
PMV BLD AUTO: 11.1 FL
PMV BLD AUTO: 11.6 FL
POCT GLUCOSE: 194 MG/DL (ref 70–110)
POCT GLUCOSE: 202 MG/DL (ref 70–110)
POCT GLUCOSE: 231 MG/DL (ref 70–110)
POCT GLUCOSE: 249 MG/DL (ref 70–110)
POIKILOCYTOSIS BLD QL SMEAR: SLIGHT
POLYCHROMASIA BLD QL SMEAR: ABNORMAL
POTASSIUM SERPL-SCNC: 3.9 MMOL/L
PROT SERPL-MCNC: 5.7 G/DL
PROTHROMBIN TIME: 11 SEC
RBC # BLD AUTO: 3.23 M/UL
RBC # BLD AUTO: 3.24 M/UL
SODIUM SERPL-SCNC: 127 MMOL/L
WBC # BLD AUTO: 1.74 K/UL
WBC # BLD AUTO: 1.88 K/UL

## 2018-08-20 PROCEDURE — 63600175 PHARM REV CODE 636 W HCPCS: Mod: NTX | Performed by: NURSE PRACTITIONER

## 2018-08-20 PROCEDURE — 99233 SBSQ HOSP IP/OBS HIGH 50: CPT | Mod: NTX,,, | Performed by: HOSPITALIST

## 2018-08-20 PROCEDURE — 99233 SBSQ HOSP IP/OBS HIGH 50: CPT | Mod: NTX,,, | Performed by: INTERNAL MEDICINE

## 2018-08-20 PROCEDURE — 63600175 PHARM REV CODE 636 W HCPCS: Mod: NTX | Performed by: HOSPITALIST

## 2018-08-20 PROCEDURE — 36415 COLL VENOUS BLD VENIPUNCTURE: CPT | Mod: NTX

## 2018-08-20 PROCEDURE — 80053 COMPREHEN METABOLIC PANEL: CPT | Mod: NTX

## 2018-08-20 PROCEDURE — 20600001 HC STEP DOWN PRIVATE ROOM: Mod: NTX

## 2018-08-20 PROCEDURE — 25000003 PHARM REV CODE 250: Mod: NTX | Performed by: HOSPITALIST

## 2018-08-20 PROCEDURE — 87086 URINE CULTURE/COLONY COUNT: CPT | Mod: NTX

## 2018-08-20 PROCEDURE — 85610 PROTHROMBIN TIME: CPT | Mod: NTX

## 2018-08-20 PROCEDURE — 85025 COMPLETE CBC W/AUTO DIFF WBC: CPT | Mod: NTX

## 2018-08-20 PROCEDURE — 25000003 PHARM REV CODE 250: Mod: NTX | Performed by: PHYSICIAN ASSISTANT

## 2018-08-20 PROCEDURE — 87088 URINE BACTERIA CULTURE: CPT | Mod: NTX

## 2018-08-20 PROCEDURE — 83735 ASSAY OF MAGNESIUM: CPT | Mod: NTX

## 2018-08-20 PROCEDURE — 83605 ASSAY OF LACTIC ACID: CPT | Mod: NTX

## 2018-08-20 PROCEDURE — 84100 ASSAY OF PHOSPHORUS: CPT | Mod: NTX

## 2018-08-20 PROCEDURE — P9047 ALBUMIN (HUMAN), 25%, 50ML: HCPCS | Mod: JG,NTX | Performed by: HOSPITALIST

## 2018-08-20 PROCEDURE — 87106 FUNGI IDENTIFICATION YEAST: CPT | Mod: NTX

## 2018-08-20 RX ORDER — ENOXAPARIN SODIUM 100 MG/ML
50 INJECTION SUBCUTANEOUS
Status: DISCONTINUED | OUTPATIENT
Start: 2018-08-20 | End: 2018-08-21

## 2018-08-20 RX ORDER — PROPRANOLOL HYDROCHLORIDE 10 MG/1
10 TABLET ORAL DAILY
Status: ON HOLD | COMMUNITY
End: 2018-08-24 | Stop reason: HOSPADM

## 2018-08-20 RX ORDER — ENTECAVIR 0.5 MG/1
0.5 TABLET, FILM COATED ORAL DAILY
Status: DISCONTINUED | OUTPATIENT
Start: 2018-08-20 | End: 2018-08-24 | Stop reason: HOSPADM

## 2018-08-20 RX ORDER — ENTECAVIR 0.5 MG/1
0.5 TABLET, FILM COATED ORAL EVERY OTHER DAY
Status: DISCONTINUED | OUTPATIENT
Start: 2018-08-21 | End: 2018-08-20

## 2018-08-20 RX ORDER — ALBUMIN HUMAN 250 G/1000ML
25 SOLUTION INTRAVENOUS EVERY 6 HOURS
Status: COMPLETED | OUTPATIENT
Start: 2018-08-20 | End: 2018-08-20

## 2018-08-20 RX ORDER — INSULIN ASPART 100 [IU]/ML
15 INJECTION, SOLUTION INTRAVENOUS; SUBCUTANEOUS
Status: DISCONTINUED | OUTPATIENT
Start: 2018-08-20 | End: 2018-08-23

## 2018-08-20 RX ORDER — MIDODRINE HYDROCHLORIDE 5 MG/1
15 TABLET ORAL 3 TIMES DAILY
Status: DISCONTINUED | OUTPATIENT
Start: 2018-08-20 | End: 2018-08-22

## 2018-08-20 RX ORDER — ENOXAPARIN SODIUM 100 MG/ML
40 INJECTION SUBCUTANEOUS NIGHTLY
Status: ON HOLD | COMMUNITY
End: 2018-08-24 | Stop reason: HOSPADM

## 2018-08-20 RX ORDER — LACTULOSE 10 G/15ML
30 SOLUTION ORAL; RECTAL DAILY
COMMUNITY
End: 2018-08-27

## 2018-08-20 RX ADMIN — ALBUMIN (HUMAN) 25 G: 12.5 SOLUTION INTRAVENOUS at 01:08

## 2018-08-20 RX ADMIN — MIDODRINE HYDROCHLORIDE 10 MG: 5 TABLET ORAL at 08:08

## 2018-08-20 RX ADMIN — SODIUM CHLORIDE: 9 INJECTION, SOLUTION INTRAVENOUS at 04:08

## 2018-08-20 RX ADMIN — INSULIN ASPART 2 UNITS: 100 INJECTION, SOLUTION INTRAVENOUS; SUBCUTANEOUS at 04:08

## 2018-08-20 RX ADMIN — ERTAPENEM SODIUM 1 G: 1 INJECTION, POWDER, LYOPHILIZED, FOR SOLUTION INTRAMUSCULAR; INTRAVENOUS at 03:08

## 2018-08-20 RX ADMIN — ENOXAPARIN SODIUM 50 MG: 100 INJECTION SUBCUTANEOUS at 09:08

## 2018-08-20 RX ADMIN — ENTECAVIR 0.5 MG: 0.5 TABLET, FILM COATED ORAL at 11:08

## 2018-08-20 RX ADMIN — LACTULOSE 30 G: 20 SOLUTION ORAL at 08:08

## 2018-08-20 RX ADMIN — INSULIN ASPART 15 UNITS: 100 INJECTION, SOLUTION INTRAVENOUS; SUBCUTANEOUS at 04:08

## 2018-08-20 RX ADMIN — ALBUMIN (HUMAN) 25 G: 12.5 SOLUTION INTRAVENOUS at 11:08

## 2018-08-20 RX ADMIN — MIDODRINE HYDROCHLORIDE 15 MG: 5 TABLET ORAL at 09:08

## 2018-08-20 RX ADMIN — CIPROFLOXACIN 500 MG: 500 TABLET, FILM COATED ORAL at 08:08

## 2018-08-20 RX ADMIN — LACTULOSE 30 G: 20 SOLUTION ORAL at 09:08

## 2018-08-20 RX ADMIN — ALBUMIN (HUMAN) 25 G: 12.5 SOLUTION INTRAVENOUS at 06:08

## 2018-08-20 RX ADMIN — ENOXAPARIN SODIUM 50 MG: 100 INJECTION SUBCUTANEOUS at 08:08

## 2018-08-20 RX ADMIN — SODIUM BICARBONATE 650 MG TABLET 1300 MG: at 09:08

## 2018-08-20 RX ADMIN — MIDODRINE HYDROCHLORIDE 15 MG: 5 TABLET ORAL at 03:08

## 2018-08-20 RX ADMIN — SODIUM BICARBONATE 650 MG TABLET 1300 MG: at 03:08

## 2018-08-20 RX ADMIN — INSULIN ASPART 15 UNITS: 100 INJECTION, SOLUTION INTRAVENOUS; SUBCUTANEOUS at 12:08

## 2018-08-20 RX ADMIN — INSULIN ASPART 1 UNITS: 100 INJECTION, SOLUTION INTRAVENOUS; SUBCUTANEOUS at 09:08

## 2018-08-20 RX ADMIN — VANCOMYCIN HYDROCHLORIDE 750 MG: 750 INJECTION, POWDER, LYOPHILIZED, FOR SOLUTION INTRAVENOUS at 09:08

## 2018-08-20 RX ADMIN — SODIUM BICARBONATE 650 MG TABLET 1300 MG: at 08:08

## 2018-08-20 NOTE — HOSPITAL COURSE
Hospital course:  Work up for liver transplant completed.  Patient listed for liver transplant 8/22/18 w MELD 18.    INTERVAL HISTORY:  No acute events overnight.  Will request bed on TSU.  Alert and oriented.  Appetite good.  She denies abdominal pain.  Abdomen is soft.  Last Para 8/21/18- 4700 ml removed, wbc 81/segs 2.  On admit patient had positive urine cx- (+) kleb pneumo and Enterococcus faecium.  While awaiting cx results, she was treated empirically w Vanc and Cipro.   Cipro was changed to ertapenem on 8/20/18 per ID.  Heme/Onc was also consulted for pancytopenia felt likely drug related.  Today her ANC is 739.  ID changed Ertapenem to Zosyn for empiric pseudomonas coverage.  D/w PA w ID need for abx and possible discharge tomorrow.

## 2018-08-20 NOTE — PLAN OF CARE
Problem: Patient Care Overview  Goal: Plan of Care Review  Outcome: Ongoing (interventions implemented as appropriate)  POC reviewed at bedside with patient and son. Questions and concerns addressed. Remains hypotensive. No c/o pain during this shift. IV restarted. Incont of urine. NPO since MN for stress test this am. Safety maintained. Bed in low and locked position. Call light at bedside. Son slept at bedside.

## 2018-08-20 NOTE — HPI
Ms. Reddy is a 68 year old with Hepatitis B and D cirrhosis on Entecavir (Hep B DNA not detected on 5/2018), c/b esophageal varices, DM who was transferred from [facility] for inpatient liver transplant evaluation.  She was seen by ID last week for liver transplant evaluation - no contraindications noted from ID perspective.  ID is re-consulted for evaluation of suspected sepsis given hypotension and tachycardia.      Blood cultures NGTD  Ascitic fluid cultures NGTD - cell count negative for SBP  Urine culture - ESBL K. Pneumo and E. Faecium (vanc sensitive)   CXR on admit was clear.    Leukopenic - ANC 1000  Thrombocytopenic  Lactic acid 2.5     Son at bedside today provided translation.  She denies fevers, chills, sweats.  No sinus congestion, sore throat.  Complaining of dry intermittent cough, no SOB.  O2 sats % on RA.  Denies dysuria, frequency.  No suprapubic or flank pain.  Complains of abdominal distension, but no abdominal pain.  No nausea/vomiting.  Denies skin wounds/breakdown.   Primary complaint is fatigue.

## 2018-08-20 NOTE — ASSESSMENT & PLAN NOTE
69 y/o female with past medical history of Hep B and D cirrhosis, on entecavir with undetectable Hep B DNA, c/b EV with bleeding, HE,  Previous H/O PV thrombosis, and refractory ascites, T2DM, who presented as a transfer from [facility] for evaluation of liver transplant surgery. The patient has been declining recently with hyponatremia.     MRI/USS done at [facility] shows patent PV vein. She appears to be a suitable candidate for liver transplant provided the dobutamine stress test shows stable cardiac status..    Surgical Complexity A  Medical Complexity A

## 2018-08-20 NOTE — CONSULTS
"[facility]  Liver Transplant  Consult Note    Patient Name: Scarlett Reddy  MRN: [id number]  Admission Date: 8/14/2018  Hospital Length of Stay: 5 days  Code Status: Full Code  Attending Provider: Cristobal Rodrigues MD  Primary Care Provider: Primary Doctor No    Consults  Subjective:     History of Present Illness:  MELD-Na score: 19 at 8/17/2018  3:33 AM  MELD score: 13 at 8/17/2018  3:33 AM  Calculated from:  Serum Creatinine: 1 mg/dL at 8/17/2018  3:33 AM  Serum Sodium: 130 mmol/L at 8/17/2018  3:33 AM  Total Bilirubin: 3.8 mg/dL at 8/17/2018  3:33 AM  INR(ratio): 1.1 at 8/17/2018  3:33 AM  Age: 68 years     Estimated body mass index is 20.83 kg/m² as calculated from the following:    Height as of this encounter: 5' 5" (1.651 m).    Weight as of this encounter: 56.8 kg (125 lb 3.2 oz).     Blood Group : A POS     : Alexa Reddy is a 67 y/o female with past medical history of Hep B and D cirrhosis, on entecavir with undetectable Hep B DNA, c/b EV with bleeding, HE,  Previous H/O PV thrombosis, and refractory ascites, T2DM, who presented as a transfer from [facility] for evaluation of liver transplant surgery. Per records, the patient has been declining recently with hyponatremia due to diuretics (Na of 121), that improvement to 129 with holding diuresis, but now requiring paracentesis every 7-10 days. She presented to a hospital in [facility] for progressive weakness, found to have worsening hyponatremia. Per son report, the patient declined rapidly at the end of the trip to [place], and had to be transported by EMS.     Patient since admission feels well .She states that she does not remember the details of her trip. She states that she had a paracentesis just before flying to [place]. She denies f/c, denies N/V, deines abdominal pain. Endorses abdominal swelling. Denies melena or hematemesis.    Patient was also on Lovenox for suspected portal vein thrombosis in [place], of " note PV patent on all investigations done at [facility] [place] (USS/MRI). The patient notes she was active and independent prior to these event. She denies alcohol use. Labs on presentation are notable for PLT of 85, Na 124, Cr 1.4, ALKP 658, Tbili 2.1, AST//160       PSH: D&C for Miscarriage >30 years ago, lower midline scar, denies hysterectomy/c section.    Past Medical History:   Diagnosis Date    Ascites     Cirrhosis     Esophageal varices     Hepatic encephalopathy     Hepatitis B     Hepatitis D virus infection     Hypersplenism     Portal hypertension      History reviewed. No pertinent surgical history.    Review of patient's allergies indicates:  No Known Allergies    Family History     None        Tobacco Use    Smoking status: Unknown If Ever Smoked   Substance and Sexual Activity    Alcohol use: No     Frequency: Never    Drug use: Not on file    Sexual activity: Not on file       Scheduled Meds:   ciprofloxacin HCl  500 mg Oral Q12H    enoxaparin  50 mg Subcutaneous Q24H    entecavir  0.5 mg Oral Daily    insulin aspart U-100  12 Units Subcutaneous TIDWM    insulin detemir U-100  20 Units Subcutaneous QHS    [START ON 8/20/2018] lactulose  30 g Oral BID    midodrine  10 mg Oral TID    sodium bicarbonate  1,300 mg Oral TID    vancomycin (VANCOCIN) IVPB  750 mg Intravenous Q24H     Continuous Infusions:  PRN Meds:sodium chloride, dextrose 50%, dextrose 50%, glucagon (human recombinant), glucose, glucose, insulin aspart U-100, sodium chloride 0.9%    Review of Systems   Constitutional: Positive for activity change, appetite change and fatigue. Negative for chills and fever.   Respiratory: Negative for cough and shortness of breath.    Cardiovascular: Negative for chest pain and leg swelling.   Gastrointestinal: Positive for abdominal distention and nausea. Negative for constipation, diarrhea and rectal pain.   Genitourinary: Negative for difficulty urinating and dysuria.   Musculoskeletal:  Negative.    Skin: Negative for color change and rash.   Allergic/Immunologic: Negative for immunocompromised state.   Neurological: Negative for dizziness and light-headedness.   Psychiatric/Behavioral: Positive for decreased concentration.   All other systems reviewed and are negative.     Objective:     Vital Signs (Most Recent):  Temp: 98.1 °F (36.7 °C) (08/19/18 1605)  Pulse: (!) 127 (08/19/18 1900)  Resp: 20 (08/19/18 1605)  BP: (!) 110/53 (08/19/18 1605)  SpO2: 99 % (08/19/18 1605) Vital Signs (24h Range):  Temp:  [96.8 °F (36 °C)-99 °F (37.2 °C)] 98.1 °F (36.7 °C)  Pulse:  [] 127  Resp:  [16-20] 20  SpO2:  [95 %-99 %] 99 %  BP: ()/(51-54) 110/53     Weight: 55.2 kg (121 lb 9.6 oz)  Body mass index is 20.24 kg/m².      Intake/Output Summary (Last 24 hours) at 8/19/2018 2009  Last data filed at 8/19/2018 0638  Gross per 24 hour   Intake 240 ml   Output --   Net 240 ml       Physical Exam   Constitutional: She is oriented to person, place, and time. No distress.   HENT:   Mouth/Throat: No oropharyngeal exudate.   Eyes: Pupils are equal, round, and reactive to light. No scleral icterus.   Neck: Neck supple. No thyromegaly present.   Cardiovascular: Normal heart sounds and intact distal pulses.   No murmur heard.  Pulmonary/Chest: No respiratory distress. She has no wheezes. She has no rales.   Abdominal: Soft. Bowel sounds are normal. She exhibits distension. She exhibits no mass. There is no tenderness. There is no rebound and no guarding. No hernia.       Musculoskeletal: She exhibits no edema or tenderness.   Lymphadenopathy:     She has no cervical adenopathy.   Neurological: She is alert and oriented to person, place, and time.   Skin: Skin is dry. She is not diaphoretic. No pallor.   Psychiatric: She has a normal mood and affect.   Nursing note and vitals reviewed.      Laboratory:  Labs within the past month have been reviewed.    Diagnostic Results:  I have personally reviewed all pertinent  imaging studies.    Assessment/Plan:     * Pre-transplant evaluation for chronic liver disease      67 y/o female with past medical history of Hep B and D cirrhosis, on entecavir with undetectable Hep B DNA, c/b EV with bleeding, HE,  Previous H/O PV thrombosis, and refractory ascites, T2DM, who presented as a transfer from [facility] for evaluation of liver transplant surgery. The patient has been declining recently with hyponatremia.     MRI/USS done at [facility] shows patent PV vein. She appears to be a suitable candidate for liver transplant provided the dobutamine test shows stable cardiac status..    Surgical Complexity A  Medical Complexity A            Transplant Candidacy: Patient is a 68 y.o. year old female with chronic hepatitis B being evaluated for possible OLT.  In my opinion, she is a suitable liver transplant candidate.  She will be presented to selection committee after all tests and evaluations are complete.    UNOS Patient Status  Functional Status: 70% - Cares for self: unable to carry on normal activity or active work  Physical Capacity: Limited Mobility    Counseling: I discussed with the patient the benefits of liver transplantation.  We discussed the evaluation and listing procedures.  We discussed the MELD system and the associated waiting times.  We discussed national and center specific survival results.  We discussed the option of being multiply listed in different OPOs.   We discussed the risks, benefits and potential complications related to surgery including the risks related to anesthesia, bleeding, infection, primary non-function of the allograft, the risk of reoperation as well as the risk of death.  We discussed the typical post-operative course, length of hospitalization, the long-term use of immunosuppressive therapy as well as the need for long-term routine follow-up.    PHS: I discussed the use of organs from donors with PHS increased-risk behavior, including the testing protocols  utilized, as well as data from the literature regarding the likelihood of transmission of hepatitis or HIV.  The patient that she is willing to consider such grafts.  DCD: I discussed the use of organs recovered by donation after cardiac death (DCD), including slightly decreased graft survival and greater risk of arterial and biliary complications. The potential advantage to the recipient is possibly receiving a transplant sooner by accepting such an organ. The patient that she is willing to consider such grafts.  HBcAb: I discussed the use of organs from donors with HBcAb in conjunction with long term use of HBV antiviral drugs, such as lamivudine. The small but measurable risk of hepatitis B seroconversion was discussed as well as the potentially life long need to continue antiviral drugs. The patient that she is willing to consider such grafts.  HCV Non-viremic recipient: I discussed the use of HCV-positive organs in naive recipients, including the risk of viral transmission to the patients or others, potential insurance barriers for antiviral medication coverage, risk for fibrosing cholestatic hepatitis, death or graft loss. The potential advantage to the recipient is the possibility of receiving a transplant sooner with decreased mortality risk by accepting such an organ. The patient that she is NOT willing to consider such grafts.    Simone Morse MD  Liver Transplant  [facility]

## 2018-08-20 NOTE — PROGRESS NOTES
Hospital Medicine  Progress note    Team: [facility] [place] [facility]OSP MED L Cristobal Rodrigues MD  Admit Date: 8/14/2018  AJIT 8/22/2018  Code status: Full Code    Principal Problem:  Pre-transplant evaluation for chronic liver disease    Interval hx: Patient seen and examined at bedside, no acute events overnight. Dobutamine stress test postponed for tomorrow. NPO after MN. ID consulted for antibiotic guidance.     ROS   Review of Systems   Constitutional: Negative for chills and fever.   HENT: Negative for ear discharge.    Eyes: Negative for blurred vision and double vision.   Cardiovascular: Negative for chest pain.   Gastrointestinal: Negative for nausea and vomiting.        Abdominal distention    Genitourinary: Negative for dysuria and urgency.   Musculoskeletal: Negative.    Skin: Negative for rash.   Neurological: Negative for tremors, sensory change and speech change.   Psychiatric/Behavioral: Negative.          PEx  Temp:  [96.1 °F (35.6 °C)-99.2 °F (37.3 °C)]   Pulse:  []   Resp:  [16-20]   BP: ()/(51-54)   SpO2:  [95 %-100 %]     Intake/Output Summary (Last 24 hours) at 8/20/2018 1239  Last data filed at 8/20/2018 0554  Gross per 24 hour   Intake 610 ml   Output --   Net 610 ml     Physical Exam   Constitutional: She is oriented to person, place, and time and well-developed, well-nourished, and in no distress.   HENT:   Head: Normocephalic and atraumatic.   Eyes: Pupils are equal, round, and reactive to light.   Neck: Normal range of motion. Neck supple. No thyromegaly present.   Cardiovascular: Normal rate, regular rhythm and normal heart sounds.   Pulmonary/Chest: Effort normal and breath sounds normal.   Abdominal: Soft. Bowel sounds are normal. She exhibits distension. There is no tenderness. There is no guarding.   Musculoskeletal: Normal range of motion.   Neurological: She is alert and oriented to person, place, and time.   Skin: Skin is warm and dry. No rash noted. No erythema.     Recent Labs   Lab   08/19/18   0358  08/20/18   0748  08/20/18   0924   WBC  1.58*  1.88*  1.74*   HGB  8.4*  10.0*  10.0*   HCT  25.1*  29.6*  29.4*   PLT  43*  48*  43*     Recent Labs   Lab  08/14/18   1149   08/18/18   0857  08/19/18   0358  08/20/18   0748   NA  124*   < >  131*  130*  128*  127*   K  3.5   < >  3.5  3.5  3.9  3.9   CL  100   < >  103  103  102  99   CO2  12*   < >  17*  18*  17*  18*   BUN  69*   < >  42*  41*  48*  45*   CREATININE  1.4   < >  1.2  1.2  1.0  1.2   GLU  289*   < >  288*  286*  145*  189*   CALCIUM  9.5   < >  9.0  9.0  8.3*  8.7   MG  2.7*   < >  2.2  2.2  2.1  2.0   PHOS   --    < >  2.9  2.8  3.2  3.0   LIPASE  196*   --    --    --    --     < > = values in this interval not displayed.     Recent Labs   Lab  08/18/18   0857  08/19/18   0358  08/20/18   0748   ALKPHOS  470*  473*  464*  480*   ALT  115*  115*  133*  137*   AST  169*  168*  229*  209*   ALBUMIN  3.9  3.9  3.3*  3.7   PROT  5.7*  5.7*  5.0*  5.7*   BILITOT  2.8*  2.8*  2.0*  2.0*   INR  1.1  1.2  1.1      Recent Labs   Lab  08/19/18   0815  08/19/18   1218  08/19/18   1808  08/19/18   2050  08/20/18   0806  08/20/18   1139   POCTGLUCOSE  153*  342*  369*  283*  194*  231*     No results for input(s): CPK, CPKMB, MB, TROPONINI in the last 72 hours.    Scheduled Meds:   ciprofloxacin HCl  500 mg Oral Q12H    enoxaparin  50 mg Subcutaneous Q12H    entecavir  0.5 mg Oral Daily    insulin aspart U-100  15 Units Subcutaneous TIDWM    insulin detemir U-100  20 Units Subcutaneous QHS    lactulose  30 g Oral BID    midodrine  15 mg Oral TID    sodium bicarbonate  1,300 mg Oral TID    vancomycin (VANCOCIN) IVPB  750 mg Intravenous Q24H     Continuous Infusions:  As Needed:  sodium chloride, dextrose 50%, dextrose 50%, glucagon (human recombinant), glucose, glucose, insulin aspart U-100, sodium chloride 0.9%    Active Hospital Problems    Diagnosis  POA    *Pre-transplant evaluation for chronic liver disease  [Z01.818]  Not Applicable    Hepatic encephalopathy [K72.90]  Yes     Priority: 1 - High    Pre-transplant evaluation for liver transplant [Z01.818]  Not Applicable    Severe malnutrition [E43]  Yes    Anemia requiring transfusions [D64.9]  No    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 [B18.0, K74.60]  Yes    Type 2 diabetes mellitus, with long-term current use of insulin [E11.9, Z79.4]  Not Applicable    Esophageal varices without bleeding [I85.00]  Yes    Liver transplant candidate [Z76.82]  Not Applicable      Resolved Hospital Problems   No resolved problems to display.       Overview    67 yo F with with above mentioned medical history presents with confusion and decompensated liver cirrhosis, admitted for expedited liver transplant evaluation    Assessment and Plan for Problems addressed today:    Hepatic Encephalopathy  - ammonia levels high at 67 on admission  - Started on PO Lactulose, one time rectal lactulose given in the ER  - AAO X 3      Decompensated Liver cirrhosis due to Hep B And D  MELD-Na score: 21 at 8/20/2018  7:48 AM  MELD score: 12 at 8/20/2018  7:48 AM  Calculated from:  Serum Creatinine: 1.2 mg/dL at 8/20/2018  7:48 AM  Serum Sodium: 127 mmol/L at 8/20/2018  7:48 AM  Total Bilirubin: 2 mg/dL at 8/20/2018  7:48 AM  INR(ratio): 1.1 at 8/20/2018  7:48 AM  Age: 68 years  - Hold beta blockers due to hypotension  - IR paracentesis negative for SBP, d/c ceftriaxone   - UA/Urine culture  - blood cultures NGTD  - Fluid restriction  - US liver doppler  - Hepatology consult  - AFP 2.9  - Lactic acid 2.5 on admission, now 2.0  - PETH pending  - EGD on 08/17 negative for active bleeding, Portal HTN gastropathy and Grade I varices    ESBL/E faecium UTI   - contact precautions  - Started on PO Ciprofloxacin and IV Vancomycin  - repeat Urine culture pending   - Lactic acid trending up  - ID consulted, for antibiotic guidance    Anemia requiring blood transfusions  - transfused one unit  of PRBCs 8/16, responded well   - Hgb 10.0 monitor daily CBC   - EGD revealed no active bleeding     Metabolic acidosis  - VBG: metabolic acidosis with respiratory alkalosis, normal pH  - start sodium bicarbonate tablets TID     Diabetes type 2  - POCT q ACHS  - low dose sliding scale   - HgbA1c 6.8  - cont insulin at night, 20 units, 10 units TID      Hyponatremia  - most likely hypervolemic  - Will give IV albumin trial  - fluid restriction 1000cc  - Urine sodium  - 124-- 131--128-->130-->131-->128-->127     Portal Vein thrombosis  - US liver doppler: no evidence of thrombus   - restart Lovenox daily      Diet:  Low sodium  DVT PPx:    Anticoagulants   Medication Route Frequency    enoxaparin injection 50 mg Subcutaneous Q12H       Goals of Care: Full code    Discharge plan and follow up: pending Liver transplant evaluation     Provider    Cristobal Melendez MD  Staff Hospitalist  Department of Hospital Medicine  [facility]   [phone]

## 2018-08-20 NOTE — CONSULTS
[facility]  Infectious Disease  Consult Note    Patient Name: Scarlett Reddy  MRN: [id number]  Admission Date: 8/14/2018  Hospital Length of Stay: 6 days  Attending Physician: Cristobal Rodrigues MD  Primary Care Provider: Primary Doctor No     Isolation Status: Contact    Patient information was obtained from patient and relative(s).      Consults  Assessment/Plan:     Pre-transplant evaluation for liver transplant       68 year old with Hepatitis B and D cirrhosis on Entecavir (Hep B DNA not detected on 5/2018), c/b esophageal varices, DM who was transferred from [facility] for inpatient liver transplant evaluation.  She was seen by ID last week for liver transplant evaluation - no contraindications noted from ID perspective at that time.  ID is now re-consulted for evaluation of suspected sepsis given hypotension and tachycardia.      Blood cultures 8/16 and 8/17  NGTD  Ascitic fluid cultures NGTD - cell count negative for SBP  Urine culture - ESBL K. Pneumo and E. Faecium (vanc sensitive), though patient asymptomatic. WBC 30. Currently on vanc and cipro.   CXR on admit was clear.    Leukopenic - ANC 1000  Thrombocytopenic  Lactic acid 2.5      Patient denies fevers, chills, sweats.  No sinus congestion, sore throat.  Complaining of dry intermittent cough, no SOB.  O2 sats % on RA, but decreased breath sounds left base on exam today.   Denies dysuria, frequency.  No suprapubic or flank pain.  Abdominal distension, but denies abdominal pain.  No nausea/vomiting.  Denies skin wounds/breakdown. IV sites clear.  Currently on IV vancomycin and oral ciprofloxacin.  New urine culture collected today and pending.       Plan/Recommendations:  1.  Continue IV vancomycin 750 mg q 24 hours.  Trough before 4th dose.   2.  Discontinue ciprofloxacin and broaden coverage for now to ertapenem 1 gram IV q 24 hours (done)  3.  Consider CXR given decreased breath sounds left base  4.  Cell count/cultures if  paracentesis.   5.  Will follow pending cultures and follow up tomorrow.     Patient seen by, and plan discussed with, ID staff  Discussed with Primary Team               Thank you.   Please call for any questions or concerns.  ASHOK Headley, ANP-C  [phone]    Subjective:     Principal Problem: Pre-transplant evaluation for chronic liver disease    HPI:   Ms. eRddy is a 68 year old with Hepatitis B and D cirrhosis on Entecavir (Hep B DNA not detected on 5/2018), c/b esophageal varices, DM who was transferred from [facility] for inpatient liver transplant evaluation.  She was seen by ID last week for liver transplant evaluation - no contraindications noted from ID perspective.  ID is re-consulted for evaluation of suspected sepsis given hypotension and tachycardia.      Blood cultures NGTD  Ascitic fluid cultures NGTD - cell count negative for SBP  Urine culture - ESBL K. Pneumo and E. Faecium (vanc sensitive)   CXR on admit was clear.    Leukopenic - ANC 1000  Thrombocytopenic  Lactic acid 2.5     Son at bedside today provided translation.  She denies fevers, chills, sweats.  No sinus congestion, sore throat.  Complaining of dry intermittent cough, no SOB.  O2 sats % on RA.  Denies dysuria, frequency.  No suprapubic or flank pain.  Complains of abdominal distension, but no abdominal pain.  No nausea/vomiting.  Denies skin wounds/breakdown.   Primary complaint is fatigue.     Past Medical History:   Diagnosis Date    Ascites     Cirrhosis     Esophageal varices     Hepatic encephalopathy     Hepatitis B     Hepatitis D virus infection     Hypersplenism     Portal hypertension        History reviewed. No pertinent surgical history.    Review of patient's allergies indicates:  No Known Allergies    Medications:  Medications Prior to Admission   Medication Sig    blood sugar diagnostic (ACCU-CHEK DC) Strp 1 strip by Misc.(Non-Drug; Combo Route) route 3 (three) times daily.    enoxaparin (LOVENOX)  40 mg/0.4 mL Syrg Inject 40 mg into the skin every evening.     entecavir (BARACLUDE) 0.5 MG Tab Take 0.5 mg by mouth once daily.    insulin aspart U-100 (NOVOLOG) 100 unit/mL injection Inject into the skin 3 (three) times daily before meals. 10-17 units sliding scale with meals    insulin glargine (LANTUS) 100 unit/mL injection Inject 24 Units into the skin every evening.     lactulose (CHRONULAC) 10 gram/15 mL solution Take 30 mLs by mouth once daily.     propranolol (INDERAL) 10 MG tablet Take 10 mg by mouth once daily.    propranolol (INDERAL) 60 MG tablet Take 60 mg by mouth once daily.     Antibiotics (From admission, onward)    Start     Stop Route Frequency Ordered    08/20/18 1530  ertapenem (INVANZ) 1 g in sodium chloride 0.9 % 100 mL IVPB (ready to mix system)      -- IV Every 24 hours (non-standard times) 08/20/18 1417    08/19/18 1800  vancomycin 750 mg in dextrose 5 % 250 mL IVPB (ready to mix system)  (Vancomycin IVPB with levels panel)      -- IV Every 24 hours (non-standard times) 08/19/18 1614        Antifungals (From admission, onward)    None        Antivirals (From admission, onward)        Stop Route Frequency     entecavir      -- Oral Daily             There is no immunization history on file for this patient.    Family History     None        Social History     Socioeconomic History    Marital status:      Spouse name: None    Number of children: None    Years of education: None    Highest education level: None   Social Needs    Financial resource strain: None    Food insecurity - worry: None    Food insecurity - inability: None    Transportation needs - medical: None    Transportation needs - non-medical: None   Occupational History    None   Tobacco Use    Smoking status: Unknown If Ever Smoked   Substance and Sexual Activity    Alcohol use: No     Frequency: Never    Drug use: None    Sexual activity: None   Other Topics Concern    None   Social History  Narrative    None     Review of Systems   Constitutional: Positive for activity change and fatigue. Negative for appetite change, chills, diaphoresis and fever.   HENT: Negative for congestion, dental problem, sinus pressure, sinus pain, sneezing, sore throat and trouble swallowing.    Eyes: Negative.    Respiratory: Positive for cough (dry, intermittent). Negative for shortness of breath and wheezing.    Cardiovascular: Negative for chest pain, palpitations and leg swelling.   Gastrointestinal: Positive for abdominal distention and diarrhea (intermittent with lactulose). Negative for abdominal pain, nausea and vomiting.   Genitourinary: Negative for difficulty urinating, dysuria, flank pain, frequency, hematuria and urgency.   Musculoskeletal: Negative for arthralgias, back pain and neck pain.   Skin: Negative for color change.   Neurological: Negative for dizziness and weakness.   Hematological: Negative for adenopathy. Bruises/bleeds easily.   Psychiatric/Behavioral: Positive for decreased concentration. The patient is not nervous/anxious.      Objective:     Vital Signs (Most Recent):  Temp: 98.3 °F (36.8 °C) (08/20/18 1239)  Pulse: 102 (08/20/18 1523)  Resp: 17 (08/20/18 1239)  BP: (!) 85/49 (08/20/18 1239)  SpO2: 98 % (08/20/18 1239) Vital Signs (24h Range):  Temp:  [96.1 °F (35.6 °C)-99.2 °F (37.3 °C)] 98.3 °F (36.8 °C)  Pulse:  [] 102  Resp:  [16-20] 17  SpO2:  [95 %-100 %] 98 %  BP: ()/(49-54) 85/49     Weight: 55.2 kg (121 lb 9.6 oz)  Body mass index is 20.24 kg/m².    Estimated Creatinine Clearance: 38.6 mL/min (based on SCr of 1.2 mg/dL).    Physical Exam   Constitutional: No distress.   Chronically ill appearing   HENT:   Head: Normocephalic.   Mouth/Throat: She does not have dentures. No oral lesions. Normal dentition. No oropharyngeal exudate.   Eyes: Conjunctivae are normal. No scleral icterus.   Neck: Normal range of motion. Neck supple.   Cardiovascular: Regular rhythm and normal heart  sounds. Tachycardia present. Exam reveals no gallop and no friction rub.   No murmur heard.  Rate    Pulmonary/Chest: Effort normal. No respiratory distress.   Decreased breath sounds left base   Abdominal: She exhibits distension. There is no tenderness. There is no guarding.   Firm     Musculoskeletal: She exhibits no edema.   Lymphadenopathy:     She has no cervical adenopathy.     She has no axillary adenopathy.        Right: No supraclavicular adenopathy present.        Left: No supraclavicular adenopathy present.   Neurological: She is alert.   LAO   Skin: Skin is warm and dry. She is not diaphoretic.   Evidence of healed rash left lower leg  IV and old IV site clear.  No evidence of thrombophlebitis     Psychiatric:   Appears very fatigued  Responds to questions appropriately   Vitals reviewed.      Significant Labs:   Blood Culture:   Recent Labs   Lab  08/14/18   1149  08/16/18   0214  08/16/18   0221  08/17/18   2236   LABBLOO  No growth after 5 days.  No growth after 5 days.  No Growth to date  No Growth to date  No Growth to date  No Growth to date  No Growth to date  No Growth to date  No Growth to date  No Growth to date  No Growth to date  No Growth to date  No Growth to date  No Growth to date  No Growth to date  No Growth to date  No Growth to date  No Growth to date     CBC:   Recent Labs   Lab  08/19/18   0358  08/20/18   0748  08/20/18   0924   WBC  1.58*  1.88*  1.74*   HGB  8.4*  10.0*  10.0*   HCT  25.1*  29.6*  29.4*   PLT  43*  48*  43*     CMP:   Recent Labs   Lab  08/19/18   0358  08/20/18   0748   NA  128*  127*   K  3.9  3.9   CL  102  99   CO2  17*  18*   GLU  145*  189*   BUN  48*  45*   CREATININE  1.0  1.2   CALCIUM  8.3*  8.7   PROT  5.0*  5.7*   ALBUMIN  3.3*  3.7   BILITOT  2.0*  2.0*   ALKPHOS  464*  480*   AST  229*  209*   ALT  133*  137*   ANIONGAP  9  10   EGFRNONAA  57.6*  46.2*     Lactic Acid:   Recent Labs   Lab  08/20/18   0924   LACTATE  2.5*      Urine Culture:   Recent Labs   Lab  08/16/18   0548   LABURIN  KLEBSIELLA PNEUMONIAE ESBL  >100,000 cfu/ml    ENTEROCOCCUS FAECIUM  > 100,000 cfu/ml       Urine Studies:   Recent Labs   Lab  08/17/18   2315   COLORU  Stacy   APPEARANCEUA  Clear   PHUR  6.0   SPECGRAV  1.020   PROTEINUA  Negative   GLUCUA  1+*   KETONESU  Negative   BILIRUBINUA  Negative   OCCULTUA  Negative   NITRITE  Negative   UROBILINOGEN  2.0   LEUKOCYTESUR  2+*   RBCUA  1   WBCUA  30*   BACTERIA  Few*   SQUAMEPITHEL  5       Significant Imaging: I have reviewed all pertinent imaging results/findings within the past 24 hours.

## 2018-08-20 NOTE — ASSESSMENT & PLAN NOTE
68 year old female with past medical history of Hep B and D cirrhosis on Entecavir c/b EV with bleeding, HE, PV thrombosis, and refractory ascites. Patient admitted for HE and is currently undergoing liver transplant evaluation.     Although patient has minor complaints she continued to remain leukopenic with intermittent tachycardia and BP today on the lower end. From our perspective apart from continuing antibiotics and midodrine would repeat infectious workup by starting with CXR, BCx, and UCx.     Recommendations:  --Daily CMP, CBC, and INR  --Low salt diet  --Obtain CXR, BCx, and UCx  --Obtain Hep B DNA  --Continue Lactulose (titrate to 3-5 BM in 24 hours)  --Continue Midodrine   --Continue antibiotics  --Continue Entecavir at current dose (will titrate to renal function)  --Obtain dobutamine stress

## 2018-08-20 NOTE — SUBJECTIVE & OBJECTIVE
Interval History:   Borderline BP with intermittent tachycardia.  This morning patient complain of a non-productive cough, diffuse abdominal pain which improved after a bowel movement, and a vaginal rash.    Current Facility-Administered Medications   Medication    0.9%  NaCl infusion (for blood administration)    albumin human 25% bottle 25 g    ciprofloxacin HCl tablet 500 mg    dextrose 50% injection 12.5 g    dextrose 50% injection 25 g    enoxaparin injection 50 mg    entecavir tablet 0.5 mg    glucagon (human recombinant) injection 1 mg    glucose chewable tablet 16 g    glucose chewable tablet 24 g    insulin aspart U-100 pen 0-5 Units    insulin aspart U-100 pen 15 Units    insulin detemir U-100 pen 20 Units    lactulose 20 gram/30 mL solution Soln 30 g    midodrine tablet 15 mg    sodium bicarbonate tablet 1,300 mg    sodium chloride 0.9% flush 3 mL    vancomycin 750 mg in dextrose 5 % 250 mL IVPB (ready to mix system)       Objective:     Vital Signs (Most Recent):  Temp: 98.3 °F (36.8 °C) (08/20/18 1239)  Pulse: 93 (08/20/18 1239)  Resp: 17 (08/20/18 1239)  BP: (!) 85/49 (08/20/18 1239)  SpO2: 98 % (08/20/18 1239) Vital Signs (24h Range):  Temp:  [96.1 °F (35.6 °C)-99.2 °F (37.3 °C)] 98.3 °F (36.8 °C)  Pulse:  [] 93  Resp:  [16-20] 17  SpO2:  [95 %-100 %] 98 %  BP: ()/(49-54) 85/49     Weight: 55.2 kg (121 lb 9.6 oz) (08/18/18 0405)  Body mass index is 20.24 kg/m².    Physical Exam   Constitutional: She is oriented to person, place, and time. No distress.   HENT:   Head: Normocephalic and atraumatic.   Eyes: No scleral icterus.   Cardiovascular: Normal rate and regular rhythm.   Pulmonary/Chest: Effort normal and breath sounds normal.   Abdominal: Soft. Bowel sounds are normal. She exhibits distension. She exhibits no mass. There is no tenderness. There is no rebound and no guarding. No hernia.   Genitourinary:   Genitourinary Comments: On inspection there is erythema of the  exterior labia majora as well as erythema along the crease that forms between the thigh and groin    Musculoskeletal: She exhibits edema.   Neurological: She is alert and oriented to person, place, and time.   Skin: She is not diaphoretic.       MELD-Na score: 21 at 8/20/2018  7:48 AM  MELD score: 12 at 8/20/2018  7:48 AM  Calculated from:  Serum Creatinine: 1.2 mg/dL at 8/20/2018  7:48 AM  Serum Sodium: 127 mmol/L at 8/20/2018  7:48 AM  Total Bilirubin: 2 mg/dL at 8/20/2018  7:48 AM  INR(ratio): 1.1 at 8/20/2018  7:48 AM  Age: 68 years    Significant Labs:  CBC:   Recent Labs   Lab  08/20/18   0924   WBC  1.74*   RBC  3.23*   HGB  10.0*   HCT  29.4*   PLT  43*     CMP:   Recent Labs   Lab  08/20/18   0748   GLU  189*   CALCIUM  8.7   ALBUMIN  3.7   PROT  5.7*   NA  127*   K  3.9   CO2  18*   CL  99   BUN  45*   CREATININE  1.2   ALKPHOS  480*   ALT  137*   AST  209*   BILITOT  2.0*     Coagulation:   Recent Labs   Lab  08/20/18   0748   INR  1.1       Significant Imaging:  X-Ray: Reviewed  US: Reviewed  MRI: Reviewed

## 2018-08-20 NOTE — PHARMACY MED REC
"Admission Medication Reconciliation - Pharmacy Consult Note    The home medication history was taken by Kimber Owusu, Pharmacy technician .  Based on information gathered and subsequent review by the clinical pharmacist, the items below may need attention.     You may go to "Admission" then "Reconcile Home Medications" tabs to review and/or act upon these items.     Potentially problematic discrepancies with current MAR  o Patient IS taking the following which was not ordered upon admit  o Propranolol (held due to hypotension can be reordered once normotensive-see below, reports taking two different dosages)    Potential issues to be addressed PRIOR TO DISCHARGE  Please clarify propranolol dose prior to discharge. Home medication lists propranol 60 mg po daily and Deralin (international propranolol) 10 mg po daily.    Please address this information as you see fit.  Feel free to contact us if you have any questions or require assistance.    Bernie Nash, PharmD.  [phone]            .    .          "

## 2018-08-20 NOTE — CONSULTS
"[facility]  Liver Transplant  Consult Note    Patient Name: Scarlett Reddy  MRN: [id number]  Admission Date: 8/14/2018  Hospital Length of Stay: 5 days  Code Status: Full Code  Attending Provider: Cristobal Rodrigues MD  Primary Care Provider: Primary Doctor No    Consults  Subjective:     History of Present Illness:  MELD-Na score: 19 at 8/17/2018  3:33 AM  MELD score: 13 at 8/17/2018  3:33 AM  Calculated from:  Serum Creatinine: 1 mg/dL at 8/17/2018  3:33 AM  Serum Sodium: 130 mmol/L at 8/17/2018  3:33 AM  Total Bilirubin: 3.8 mg/dL at 8/17/2018  3:33 AM  INR(ratio): 1.1 at 8/17/2018  3:33 AM  Age: 68 years     Estimated body mass index is 20.83 kg/m² as calculated from the following:    Height as of this encounter: 5' 5" (1.651 m).    Weight as of this encounter: 56.8 kg (125 lb 3.2 oz).     Blood Group : A POS     : Alexa Reddy is a 69 y/o female with past medical history of Hep B and D cirrhosis, on entecavir with undetectable Hep B DNA, c/b EV with bleeding, HE,  Previous H/O PV thrombosis, and refractory ascites, T2DM, who presented as a transfer from [facility] for evaluation of liver transplant surgery. Per records, the patient has been declining recently with hyponatremia due to diuretics (Na of 121), that improvement to 129 with holding diuresis, but now requiring paracentesis every 7-10 days. She presented to a hospital in [facility] for progressive weakness, found to have worsening hyponatremia. Per son report, the patient declined rapidly at the end of the trip to [place], and had to be transported by EMS.     Patient since admission feels well .She states that she does not remember the details of her trip. She states that she had a paracentesis just before flying to [place]. She denies f/c, denies N/V, deines abdominal pain. Endorses abdominal swelling. Denies melena or hematemesis.    Patient was also on Lovenox for suspected portal vein thrombosis in [place], of " note PV patent on all investigations done at [facility] (USS/MRI). The patient notes she was active and independent prior to these event. She denies alcohol use. Labs on presentation are notable for PLT of 85, Na 124, Cr 1.4, ALKP 658, Tbili 2.1, AST//160       PSH: D&C for Miscarriage >30 years ago, lower midline scar, denies hysterectomy/c section.    No new subjective & objective note has been filed under this hospital service since the last note was generated.    Assessment/Plan:     * Pre-transplant evaluation for chronic liver disease      69 y/o female with past medical history of Hep B and D cirrhosis, on entecavir with undetectable Hep B DNA, c/b EV with bleeding, HE,  Previous H/O PV thrombosis, and refractory ascites, T2DM, who presented as a transfer from [facility] for evaluation of liver transplant surgery. The patient has been declining recently with hyponatremia.     MRI/USS done at [facility] shows patent PV vein. She appears to be a suitable candidate for liver transplant provided the dobutamine stress test shows stable cardiac status..    Surgical Complexity A  Medical Complexity A            Transplant Candidacy: Patient is a 68 y.o. year old female with chronic hepatitis B being evaluated for possible OLT.  In my opinion, she is a suitable liver transplant candidate.  She will be presented to selection committee after all tests and evaluations are complete.        UNOS Patient Status  Functional Status: 70% - Cares for self: unable to carry on normal activity or active work  Physical Capacity: Limited Mobility    Counseling: I discussed with the patient the benefits of liver transplantation.  We discussed the evaluation and listing procedures.  We discussed the MELD system and the associated waiting times.  We discussed national and center specific survival results.  We discussed the option of being multiply listed in different OPOs.   We discussed the risks, benefits and potential complications related  to surgery including the risks related to anesthesia, bleeding, infection, primary non-function of the allograft, the risk of reoperation as well as the risk of death.  We discussed the typical post-operative course, length of hospitalization, the long-term use of immunosuppressive therapy as well as the need for long-term routine follow-up.    PHS: I discussed the use of organs from donors with PHS increased-risk behavior, including the testing protocols utilized, as well as data from the literature regarding the likelihood of transmission of hepatitis or HIV.  The patient that she is willing to consider such grafts.  DCD: I discussed the use of organs recovered by donation after cardiac death (DCD), including slightly decreased graft survival and greater risk of arterial and biliary complications. The potential advantage to the recipient is possibly receiving a transplant sooner by accepting such an organ. The patient that she is willing to consider such grafts.  HBcAb: I discussed the use of organs from donors with HBcAb in conjunction with long term use of HBV antiviral drugs, such as lamivudine. The small but measurable risk of hepatitis B seroconversion was discussed as well as the potentially life long need to continue antiviral drugs. The patient that she is willing to consider such grafts.  HCV Non-viremic recipient: I discussed the use of HCV-positive organs in naive recipients, including the risk of viral transmission to the patients or others, potential insurance barriers for antiviral medication coverage, risk for fibrosing cholestatic hepatitis, death or graft loss. The potential advantage to the recipient is the possibility of receiving a transplant sooner with decreased mortality risk by accepting such an organ. The patient is NOT willing to consider such grafts.    Simone Morse MD  Liver Transplant  [facility]

## 2018-08-20 NOTE — ASSESSMENT & PLAN NOTE
68 year old with Hepatitis B and D cirrhosis on Entecavir (Hep B DNA not detected on 5/2018), c/b esophageal varices, DM who was transferred from [facility] for inpatient liver transplant evaluation.  She was seen by ID last week for liver transplant evaluation - no contraindications noted from ID perspective at that time.  ID is now re-consulted for evaluation of suspected sepsis given hypotension and tachycardia.      Blood cultures 8/16 and 8/17  NGTD  Ascitic fluid cultures NGTD - cell count negative for SBP  Urine culture - ESBL K. Pneumo and E. Faecium (vanc sensitive), though patient asymptomatic. WBC 30. Currently on vanc and cipro.   CXR on admit was clear.    Leukopenic - ANC 1000  Thrombocytopenic  Lactic acid 2.5      Patient denies fevers, chills, sweats.  No sinus congestion, sore throat.  Complaining of dry intermittent cough, no SOB.  O2 sats % on RA, but decreased breath sounds left base on exam today.   Denies dysuria, frequency.  No suprapubic or flank pain.  Abdominal distension, but denies abdominal pain.  No nausea/vomiting.  Denies skin wounds/breakdown. IV sites clear.  Currently on IV vancomycin and oral ciprofloxacin.  New urine culture collected today and pending.       Plan/Recommendations:  1.  Continue IV vancomycin 750 mg q 24 hours.  Trough before 4th dose.   2.  Discontinue ciprofloxacin and broaden coverage for now to ertapenem 1 gram IV q 24 hours (done)  3.  Consider CXR given decreased breath sounds left base  4.  Cell count/cultures if paracentesis.   5.  Will follow pending cultures and follow up tomorrow.     Patient seen by, and plan discussed with, ID staff  Discussed with Primary Team

## 2018-08-20 NOTE — TELEPHONE ENCOUNTER
Email sent to Dr. Wooten requesting full stress test on patient in case we are unable to perform stress test here due to patient hypotension.

## 2018-08-20 NOTE — SUBJECTIVE & OBJECTIVE
Past Medical History:   Diagnosis Date    Ascites     Cirrhosis     Esophageal varices     Hepatic encephalopathy     Hepatitis B     Hepatitis D virus infection     Hypersplenism     Portal hypertension      History reviewed. No pertinent surgical history.    Review of patient's allergies indicates:  No Known Allergies    Family History     None        Tobacco Use    Smoking status: Unknown If Ever Smoked   Substance and Sexual Activity    Alcohol use: No     Frequency: Never    Drug use: Not on file    Sexual activity: Not on file       Scheduled Meds:   ciprofloxacin HCl  500 mg Oral Q12H    enoxaparin  50 mg Subcutaneous Q24H    entecavir  0.5 mg Oral Daily    insulin aspart U-100  12 Units Subcutaneous TIDWM    insulin detemir U-100  20 Units Subcutaneous QHS    [START ON 8/20/2018] lactulose  30 g Oral BID    midodrine  10 mg Oral TID    sodium bicarbonate  1,300 mg Oral TID    vancomycin (VANCOCIN) IVPB  750 mg Intravenous Q24H     Continuous Infusions:  PRN Meds:sodium chloride, dextrose 50%, dextrose 50%, glucagon (human recombinant), glucose, glucose, insulin aspart U-100, sodium chloride 0.9%    Review of Systems   Constitutional: Positive for fatigue. Negative for activity change, appetite change, chills and fever.   Respiratory: Negative for cough and shortness of breath.    Cardiovascular: Negative for chest pain and leg swelling.   Gastrointestinal: Positive for abdominal distention. Negative for constipation, diarrhea and rectal pain.   Genitourinary: Negative for difficulty urinating and dysuria.   Musculoskeletal: Negative.    Skin: Negative for color change and rash.   Allergic/Immunologic: Negative for immunocompromised state.   Neurological: Negative for dizziness and light-headedness.   Psychiatric/Behavioral: Positive for decreased concentration.   All other systems reviewed and are negative.     Objective:     Vital Signs (Most Recent):  Temp: 98.6 °F (37 °C) (08/19/18  2011)  Pulse: 108 (08/19/18 2011)  Resp: 16 (08/19/18 2011)  BP: (!) 96/51 (08/19/18 2011)  SpO2: 96 % (08/19/18 2011) Vital Signs (24h Range):  Temp:  [96.8 °F (36 °C)-98.7 °F (37.1 °C)] 98.6 °F (37 °C)  Pulse:  [] 108  Resp:  [16-20] 16  SpO2:  [95 %-99 %] 96 %  BP: ()/(51-54) 96/51     Weight: 55.2 kg (121 lb 9.6 oz)  Body mass index is 20.24 kg/m².      Intake/Output Summary (Last 24 hours) at 8/19/2018 2030  Last data filed at 8/19/2018 0638  Gross per 24 hour   Intake 240 ml   Output --   Net 240 ml       Physical Exam   Constitutional: She is oriented to person, place, and time. No distress.   HENT:   Mouth/Throat: No oropharyngeal exudate.   Eyes: Pupils are equal, round, and reactive to light. No scleral icterus.   Neck: Neck supple. No thyromegaly present.   Cardiovascular: Normal heart sounds and intact distal pulses.   No murmur heard.  Pulmonary/Chest: No respiratory distress. She has no wheezes. She has no rales.   Abdominal: Soft. Bowel sounds are normal. She exhibits distension. She exhibits no mass. There is no tenderness. There is no rebound and no guarding. No hernia.       Musculoskeletal: She exhibits no edema or tenderness.   Lymphadenopathy:     She has no cervical adenopathy.   Neurological: She is alert and oriented to person, place, and time.   Skin: Skin is dry. She is not diaphoretic. No pallor.   Psychiatric: She has a normal mood and affect.   Nursing note and vitals reviewed.      Laboratory:  Labs within the past month have been reviewed.    Diagnostic Results:  I have personally reviewed all pertinent imaging studies.

## 2018-08-20 NOTE — CONSULTS
"[facility]  Liver Transplant  Consult Note    Patient Name: Scarlett Reddy  MRN: [id number]  Admission Date: 8/14/2018  Hospital Length of Stay: 5 days  Code Status: Full Code  Attending Provider: Cristobal Rodrigues MD  Primary Care Provider: Primary Doctor No    Consults  Subjective:     History of Present Illness:  MELD-Na score: 19 at 8/17/2018  3:33 AM  MELD score: 13 at 8/17/2018  3:33 AM  Calculated from:  Serum Creatinine: 1 mg/dL at 8/17/2018  3:33 AM  Serum Sodium: 130 mmol/L at 8/17/2018  3:33 AM  Total Bilirubin: 3.8 mg/dL at 8/17/2018  3:33 AM  INR(ratio): 1.1 at 8/17/2018  3:33 AM  Age: 68 years     Estimated body mass index is 20.83 kg/m² as calculated from the following:    Height as of this encounter: 5' 5" (1.651 m).    Weight as of this encounter: 56.8 kg (125 lb 3.2 oz).     Blood Group : A POS     : Alexa Reddy is a 67 y/o female with past medical history of Hep B and D cirrhosis, on entecavir with undetectable Hep B DNA, c/b EV with bleeding, HE,  Previous H/O PV thrombosis, and refractory ascites, T2DM, who presented as a transfer from [facility] for evaluation of liver transplant surgery. Per records, the patient has been declining recently with hyponatremia due to diuretics (Na of 121), that improvement to 129 with holding diuresis, but now requiring paracentesis every 7-10 days. She presented to a hospital in [facility] for progressive weakness, found to have worsening hyponatremia. Per son report, the patient declined rapidly at the end of the trip to [place], and had to be transported by EMS.     Patient since admission feels well .She states that she does not remember the details of her trip. She states that she had a paracentesis just before flying to [place]. She denies f/c, denies N/V, deines abdominal pain. Endorses abdominal swelling. Denies melena or hematemesis.    Patient was also on Lovenox for suspected portal vein thrombosis in [place], of " note PV patent on all investigations done at [facility] (USS/MRI). The patient notes she was active and independent prior to these event. She denies alcohol use. Labs on presentation are notable for PLT of 85, Na 124, Cr 1.4, ALKP 658, Tbili 2.1, AST//160       PSH: D&C for Miscarriage >30 years ago, lower midline scar, denies hysterectomy/c section.    Past Medical History:   Diagnosis Date    Ascites     Cirrhosis     Esophageal varices     Hepatic encephalopathy     Hepatitis B     Hepatitis D virus infection     Hypersplenism     Portal hypertension      History reviewed. No pertinent surgical history.    Review of patient's allergies indicates:  No Known Allergies    Family History     None        Tobacco Use    Smoking status: Unknown If Ever Smoked   Substance and Sexual Activity    Alcohol use: No     Frequency: Never    Drug use: Not on file    Sexual activity: Not on file       Scheduled Meds:   ciprofloxacin HCl  500 mg Oral Q12H    enoxaparin  50 mg Subcutaneous Q24H    entecavir  0.5 mg Oral Daily    insulin aspart U-100  12 Units Subcutaneous TIDWM    insulin detemir U-100  20 Units Subcutaneous QHS    [START ON 8/20/2018] lactulose  30 g Oral BID    midodrine  10 mg Oral TID    sodium bicarbonate  1,300 mg Oral TID    vancomycin (VANCOCIN) IVPB  750 mg Intravenous Q24H     Continuous Infusions:  PRN Meds:sodium chloride, dextrose 50%, dextrose 50%, glucagon (human recombinant), glucose, glucose, insulin aspart U-100, sodium chloride 0.9%    Review of Systems   Constitutional: Positive for fatigue. Negative for activity change, appetite change, chills and fever.   Respiratory: Negative for cough and shortness of breath.    Cardiovascular: Negative for chest pain and leg swelling.   Gastrointestinal: Positive for abdominal distention. Negative for constipation, diarrhea and rectal pain.   Genitourinary: Negative for difficulty urinating and dysuria.   Musculoskeletal: Negative.     Skin: Negative for color change and rash.   Allergic/Immunologic: Negative for immunocompromised state.   Neurological: Negative for dizziness and light-headedness.   Psychiatric/Behavioral: Positive for decreased concentration.   All other systems reviewed and are negative.     Objective:     Vital Signs (Most Recent):  Temp: 98.6 °F (37 °C) (08/19/18 2011)  Pulse: 108 (08/19/18 2011)  Resp: 16 (08/19/18 2011)  BP: (!) 96/51 (08/19/18 2011)  SpO2: 96 % (08/19/18 2011) Vital Signs (24h Range):  Temp:  [96.8 °F (36 °C)-98.7 °F (37.1 °C)] 98.6 °F (37 °C)  Pulse:  [] 108  Resp:  [16-20] 16  SpO2:  [95 %-99 %] 96 %  BP: ()/(51-54) 96/51     Weight: 55.2 kg (121 lb 9.6 oz)  Body mass index is 20.24 kg/m².      Intake/Output Summary (Last 24 hours) at 8/19/2018 2030  Last data filed at 8/19/2018 0638  Gross per 24 hour   Intake 240 ml   Output --   Net 240 ml       Physical Exam   Constitutional: She is oriented to person, place, and time. No distress.   HENT:   Mouth/Throat: No oropharyngeal exudate.   Eyes: Pupils are equal, round, and reactive to light. No scleral icterus.   Neck: Neck supple. No thyromegaly present.   Cardiovascular: Normal heart sounds and intact distal pulses.   No murmur heard.  Pulmonary/Chest: No respiratory distress. She has no wheezes. She has no rales.   Abdominal: Soft. Bowel sounds are normal. She exhibits distension. She exhibits no mass. There is no tenderness. There is no rebound and no guarding. No hernia.       Musculoskeletal: She exhibits no edema or tenderness.   Lymphadenopathy:     She has no cervical adenopathy.   Neurological: She is alert and oriented to person, place, and time.   Skin: Skin is dry. She is not diaphoretic. No pallor.   Psychiatric: She has a normal mood and affect.   Nursing note and vitals reviewed.      Laboratory:  Labs within the past month have been reviewed.    Diagnostic Results:  I have personally reviewed all pertinent imaging  studies.    Assessment/Plan:     No new Assessment & Plan notes have been filed under this hospital service since the last note was generated.  Service: Transplant Liver      Transplant Candidacy: Patient is a 68 y.o. year old female with chronic hepatitis B being evaluated for possible OLT.  In my opinion, she is a suitable liver transplant candidate.  She will be presented to selection committee after all tests and evaluations are complete.        UNOS Patient Status  Functional Status: 60% - Requires occasional assistance but is able to care for needs  Physical Capacity: Limited Mobility    Counseling: I discussed with the patient the benefits of liver transplantation.  We discussed the evaluation and listing procedures.  We discussed the MELD system and the associated waiting times.  We discussed national and center specific survival results.  We discussed the option of being multiply listed in different OPOs.   We discussed the risks, benefits and potential complications related to surgery including the risks related to anesthesia, bleeding, infection, primary non-function of the allograft, the risk of reoperation as well as the risk of death.  We discussed the typical post-operative course, length of hospitalization, the long-term use of immunosuppressive therapy as well as the need for long-term routine follow-up.    PHS: I discussed the use of organs from donors with PHS increased-risk behavior, including the testing protocols utilized, as well as data from the literature regarding the likelihood of transmission of hepatitis or HIV.  The patient that she is willing to consider such grafts.  DCD: I discussed the use of organs recovered by donation after cardiac death (DCD), including slightly decreased graft survival and greater risk of arterial and biliary complications. The potential advantage to the recipient is possibly receiving a transplant sooner by accepting such an organ. The patient that she is  willing to consider such grafts.  HBcAb: I discussed the use of organs from donors with HBcAb in conjunction with long term use of HBV antiviral drugs, such as lamivudine. The small but measurable risk of hepatitis B seroconversion was discussed as well as the potentially life long need to continue antiviral drugs. The patient that she is willing to consider such grafts.  HCV Non-viremic recipient: I discussed the use of HCV-positive organs in naive recipients, including the risk of viral transmission to the patients or others, potential insurance barriers for antiviral medication coverage, risk for fibrosing cholestatic hepatitis, death or graft loss. The potential advantage to the recipient is the possibility of receiving a transplant sooner with decreased mortality risk by accepting such an organ. The patient is not willing to consider such grafts.    Simone Morse MD  Liver Transplant  [facility]

## 2018-08-20 NOTE — PLAN OF CARE
08/20/18 1239   Discharge Reassessment   Assessment Type Discharge Planning Reassessment   Provided patient/caregiver education on the expected discharge date and the discharge plan Yes   Do you have any problems affording any of your prescribed medications? TBD   Discharge Plan A Home with family  ([facility] )   Discharge Plan B Home with family;Home Health   Can the patient answer the patient profile reliably? (Mentation waxes and wanes)   How does the patient rate their overall health at the present time? Poor   Describe the patient's ability to walk at the present time. Minor restrictions or changes   How often would a person be available to care for the patient? Whenever needed   Number of comorbid conditions (as recorded on the chart) Five or more   During the past month, has the patient often been bothered by feeling down, depressed or hopeless? No   During the past month, has the patient often been bothered by little interest or pleasure in doing things? No

## 2018-08-20 NOTE — PROGRESS NOTES
[facility]  Hepatology  Progress Note    Patient Name: Scarlett Reddy  MRN: [id number]  Admission Date: 8/14/2018  Hospital Length of Stay: 6 days  Attending Provider: Cristobal Rodrigues MD   Primary Care Physician: Primary Doctor No  Principal Problem:Pre-transplant evaluation for chronic liver disease    Subjective:     Transplant status: No    HPI: Scarlett Reddy is a 69 y/o female with past medical history of Hep B and D cirrhosis, on entecavir with undetectable Hep B DNA, c/b EV with bleeding, HE, PV thrombosis, and refractory ascites, T2DM, who presented as a transfer from [facility] for evaluation of liver transplant.     Per records, the patient has been declining recently with hyponatremia due to diuretics (Na of 121), that improvement to 129 with holding diuresis, but now requiring paracentesis every 7-10 days.   She presented to a hospital in [facility] for progressive weakness, found to have worsening hyponatremia.  Per son report, the patient declined rapidly at the end of the trip to [place], and had to be transported by EMS.    Patient today states that she is feeling well .She states that she does not remember the details of her trip. She states that she had a paracentesis just before flying to [place]. She denies f/c, denies N/V, deines abdominal pain. Endorses abdominal swelling. Denies melena or hematemesis    The patient notes she was active and independent prior to these event. She denies alcohol use.     Labs on presentation are notable for PLT of 85, Na 124, Cr 1.4, ALKP 658, Tbili 2.1, AST//160     Interval History:   Borderline BP with intermittent tachycardia.  This morning patient complain of a non-productive cough, diffuse abdominal pain which improved after a bowel movement, and a vaginal rash.    Current Facility-Administered Medications   Medication    0.9%  NaCl infusion (for blood administration)    albumin human 25% bottle 25 g    ciprofloxacin HCl tablet 500  mg    dextrose 50% injection 12.5 g    dextrose 50% injection 25 g    enoxaparin injection 50 mg    entecavir tablet 0.5 mg    glucagon (human recombinant) injection 1 mg    glucose chewable tablet 16 g    glucose chewable tablet 24 g    insulin aspart U-100 pen 0-5 Units    insulin aspart U-100 pen 15 Units    insulin detemir U-100 pen 20 Units    lactulose 20 gram/30 mL solution Soln 30 g    midodrine tablet 15 mg    sodium bicarbonate tablet 1,300 mg    sodium chloride 0.9% flush 3 mL    vancomycin 750 mg in dextrose 5 % 250 mL IVPB (ready to mix system)       Objective:     Vital Signs (Most Recent):  Temp: 98.3 °F (36.8 °C) (08/20/18 1239)  Pulse: 93 (08/20/18 1239)  Resp: 17 (08/20/18 1239)  BP: (!) 85/49 (08/20/18 1239)  SpO2: 98 % (08/20/18 1239) Vital Signs (24h Range):  Temp:  [96.1 °F (35.6 °C)-99.2 °F (37.3 °C)] 98.3 °F (36.8 °C)  Pulse:  [] 93  Resp:  [16-20] 17  SpO2:  [95 %-100 %] 98 %  BP: ()/(49-54) 85/49     Weight: 55.2 kg (121 lb 9.6 oz) (08/18/18 0405)  Body mass index is 20.24 kg/m².    Physical Exam   Constitutional: She is oriented to person, place, and time. No distress.   HENT:   Head: Normocephalic and atraumatic.   Eyes: No scleral icterus.   Cardiovascular: Normal rate and regular rhythm.   Pulmonary/Chest: Effort normal and breath sounds normal.   Abdominal: Soft. Bowel sounds are normal. She exhibits distension. She exhibits no mass. There is no tenderness. There is no rebound and no guarding. No hernia.   Genitourinary:   Genitourinary Comments: On inspection there is erythema of the exterior labia majora as well as erythema along the crease that forms between the thigh and groin    Musculoskeletal: She exhibits edema.   Neurological: She is alert and oriented to person, place, and time.   Skin: She is not diaphoretic.       MELD-Na score: 21 at 8/20/2018  7:48 AM  MELD score: 12 at 8/20/2018  7:48 AM  Calculated from:  Serum Creatinine: 1.2 mg/dL at  8/20/2018  7:48 AM  Serum Sodium: 127 mmol/L at 8/20/2018  7:48 AM  Total Bilirubin: 2 mg/dL at 8/20/2018  7:48 AM  INR(ratio): 1.1 at 8/20/2018  7:48 AM  Age: 68 years    Significant Labs:  CBC:   Recent Labs   Lab  08/20/18   0924   WBC  1.74*   RBC  3.23*   HGB  10.0*   HCT  29.4*   PLT  43*     CMP:   Recent Labs   Lab  08/20/18   0748   GLU  189*   CALCIUM  8.7   ALBUMIN  3.7   PROT  5.7*   NA  127*   K  3.9   CO2  18*   CL  99   BUN  45*   CREATININE  1.2   ALKPHOS  480*   ALT  137*   AST  209*   BILITOT  2.0*     Coagulation:   Recent Labs   Lab  08/20/18   0748   INR  1.1       Significant Imaging:  X-Ray: Reviewed  US: Reviewed  MRI: Reviewed    Assessment/Plan:     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    68 year old female with past medical history of Hep B and D cirrhosis on Entecavir c/b EV with bleeding, HE, PV thrombosis, and refractory ascites. Patient admitted for HE and is currently undergoing liver transplant evaluation.     Although patient has minor complaints she continued to remain leukopenic with intermittent tachycardia and BP today on the lower end. From our perspective apart from continuing antibiotics and midodrine would repeat infectious workup by starting with CXR, BCx, and UCx.     Recommendations:  --Daily CMP, CBC, and INR  --Low salt diet  --Obtain CXR, BCx, and UCx  --Obtain Hep B DNA  --Continue Lactulose (titrate to 3-5 BM in 24 hours)  --Continue Midodrine   --Continue antibiotics  --Continue Entecavir at current dose (will titrate to renal function)  --Obtain dobutamine stress             Thank you for your consult. I will follow-up with patient. Please contact us if you have any additional questions.    Michelle Leone M.D.  Gastroenterology Fellow, PGY-V  Pager: [phone]  [facility]

## 2018-08-20 NOTE — SUBJECTIVE & OBJECTIVE
Past Medical History:   Diagnosis Date    Ascites     Cirrhosis     Esophageal varices     Hepatic encephalopathy     Hepatitis B     Hepatitis D virus infection     Hypersplenism     Portal hypertension      History reviewed. No pertinent surgical history.    Review of patient's allergies indicates:  No Known Allergies    Family History     None        Tobacco Use    Smoking status: Unknown If Ever Smoked   Substance and Sexual Activity    Alcohol use: No     Frequency: Never    Drug use: Not on file    Sexual activity: Not on file       Scheduled Meds:   ciprofloxacin HCl  500 mg Oral Q12H    enoxaparin  50 mg Subcutaneous Q24H    entecavir  0.5 mg Oral Daily    insulin aspart U-100  12 Units Subcutaneous TIDWM    insulin detemir U-100  20 Units Subcutaneous QHS    [START ON 8/20/2018] lactulose  30 g Oral BID    midodrine  10 mg Oral TID    sodium bicarbonate  1,300 mg Oral TID    vancomycin (VANCOCIN) IVPB  750 mg Intravenous Q24H     Continuous Infusions:  PRN Meds:sodium chloride, dextrose 50%, dextrose 50%, glucagon (human recombinant), glucose, glucose, insulin aspart U-100, sodium chloride 0.9%    Review of Systems   Constitutional: Positive for activity change, appetite change and fatigue. Negative for chills and fever.   Respiratory: Negative for cough and shortness of breath.    Cardiovascular: Negative for chest pain and leg swelling.   Gastrointestinal: Positive for abdominal distention and nausea. Negative for constipation, diarrhea and rectal pain.   Genitourinary: Negative for difficulty urinating and dysuria.   Musculoskeletal: Negative.    Skin: Negative for color change and rash.   Allergic/Immunologic: Negative for immunocompromised state.   Neurological: Negative for dizziness and light-headedness.   Psychiatric/Behavioral: Positive for decreased concentration.   All other systems reviewed and are negative.     Objective:     Vital Signs (Most Recent):  Temp: 98.1 °F (36.7  °C) (08/19/18 1605)  Pulse: (!) 127 (08/19/18 1900)  Resp: 20 (08/19/18 1605)  BP: (!) 110/53 (08/19/18 1605)  SpO2: 99 % (08/19/18 1605) Vital Signs (24h Range):  Temp:  [96.8 °F (36 °C)-99 °F (37.2 °C)] 98.1 °F (36.7 °C)  Pulse:  [] 127  Resp:  [16-20] 20  SpO2:  [95 %-99 %] 99 %  BP: ()/(51-54) 110/53     Weight: 55.2 kg (121 lb 9.6 oz)  Body mass index is 20.24 kg/m².      Intake/Output Summary (Last 24 hours) at 8/19/2018 2009  Last data filed at 8/19/2018 0638  Gross per 24 hour   Intake 240 ml   Output --   Net 240 ml       Physical Exam   Constitutional: She is oriented to person, place, and time. No distress.   HENT:   Mouth/Throat: No oropharyngeal exudate.   Eyes: Pupils are equal, round, and reactive to light. No scleral icterus.   Neck: Neck supple. No thyromegaly present.   Cardiovascular: Normal heart sounds and intact distal pulses.   No murmur heard.  Pulmonary/Chest: No respiratory distress. She has no wheezes. She has no rales.   Abdominal: Soft. Bowel sounds are normal. She exhibits distension. She exhibits no mass. There is no tenderness. There is no rebound and no guarding. No hernia.       Musculoskeletal: She exhibits no edema or tenderness.   Lymphadenopathy:     She has no cervical adenopathy.   Neurological: She is alert and oriented to person, place, and time.   Skin: Skin is dry. She is not diaphoretic. No pallor.   Psychiatric: She has a normal mood and affect.   Nursing note and vitals reviewed.      Laboratory:  Labs within the past month have been reviewed.    Diagnostic Results:  I have personally reviewed all pertinent imaging studies.

## 2018-08-20 NOTE — SUBJECTIVE & OBJECTIVE
Past Medical History:   Diagnosis Date    Ascites     Cirrhosis     Esophageal varices     Hepatic encephalopathy     Hepatitis B     Hepatitis D virus infection     Hypersplenism     Portal hypertension        History reviewed. No pertinent surgical history.    Review of patient's allergies indicates:  No Known Allergies    Medications:  Medications Prior to Admission   Medication Sig    blood sugar diagnostic (ACCU-CHEK DC) Strp 1 strip by Misc.(Non-Drug; Combo Route) route 3 (three) times daily.    enoxaparin (LOVENOX) 40 mg/0.4 mL Syrg Inject 40 mg into the skin every evening.     entecavir (BARACLUDE) 0.5 MG Tab Take 0.5 mg by mouth once daily.    insulin aspart U-100 (NOVOLOG) 100 unit/mL injection Inject into the skin 3 (three) times daily before meals. 10-17 units sliding scale with meals    insulin glargine (LANTUS) 100 unit/mL injection Inject 24 Units into the skin every evening.     lactulose (CHRONULAC) 10 gram/15 mL solution Take 30 mLs by mouth once daily.     propranolol (INDERAL) 10 MG tablet Take 10 mg by mouth once daily.    propranolol (INDERAL) 60 MG tablet Take 60 mg by mouth once daily.     Antibiotics (From admission, onward)    Start     Stop Route Frequency Ordered    08/20/18 1530  ertapenem (INVANZ) 1 g in sodium chloride 0.9 % 100 mL IVPB (ready to mix system)      -- IV Every 24 hours (non-standard times) 08/20/18 1417    08/19/18 1800  vancomycin 750 mg in dextrose 5 % 250 mL IVPB (ready to mix system)  (Vancomycin IVPB with levels panel)      -- IV Every 24 hours (non-standard times) 08/19/18 1614        Antifungals (From admission, onward)    None        Antivirals (From admission, onward)        Stop Route Frequency     entecavir      -- Oral Daily             There is no immunization history on file for this patient.    Family History     None        Social History     Socioeconomic History    Marital status:      Spouse name: None    Number of children:  None    Years of education: None    Highest education level: None   Social Needs    Financial resource strain: None    Food insecurity - worry: None    Food insecurity - inability: None    Transportation needs - medical: None    Transportation needs - non-medical: None   Occupational History    None   Tobacco Use    Smoking status: Unknown If Ever Smoked   Substance and Sexual Activity    Alcohol use: No     Frequency: Never    Drug use: None    Sexual activity: None   Other Topics Concern    None   Social History Narrative    None     Review of Systems   Constitutional: Positive for activity change and fatigue. Negative for appetite change, chills, diaphoresis and fever.   HENT: Negative for congestion, dental problem, sinus pressure, sinus pain, sneezing, sore throat and trouble swallowing.    Eyes: Negative.    Respiratory: Positive for cough (dry, intermittent). Negative for shortness of breath and wheezing.    Cardiovascular: Negative for chest pain, palpitations and leg swelling.   Gastrointestinal: Positive for abdominal distention and diarrhea (intermittent with lactulose). Negative for abdominal pain, nausea and vomiting.   Genitourinary: Negative for difficulty urinating, dysuria, flank pain, frequency, hematuria and urgency.   Musculoskeletal: Negative for arthralgias, back pain and neck pain.   Skin: Negative for color change.   Neurological: Negative for dizziness and weakness.   Hematological: Negative for adenopathy. Bruises/bleeds easily.   Psychiatric/Behavioral: Positive for decreased concentration. The patient is not nervous/anxious.      Objective:     Vital Signs (Most Recent):  Temp: 98.3 °F (36.8 °C) (08/20/18 1239)  Pulse: 102 (08/20/18 1523)  Resp: 17 (08/20/18 1239)  BP: (!) 85/49 (08/20/18 1239)  SpO2: 98 % (08/20/18 1239) Vital Signs (24h Range):  Temp:  [96.1 °F (35.6 °C)-99.2 °F (37.3 °C)] 98.3 °F (36.8 °C)  Pulse:  [] 102  Resp:  [16-20] 17  SpO2:  [95 %-100 %] 98  %  BP: ()/(49-54) 85/49     Weight: 55.2 kg (121 lb 9.6 oz)  Body mass index is 20.24 kg/m².    Estimated Creatinine Clearance: 38.6 mL/min (based on SCr of 1.2 mg/dL).    Physical Exam   Constitutional: No distress.   Chronically ill appearing   HENT:   Head: Normocephalic.   Mouth/Throat: She does not have dentures. No oral lesions. Normal dentition. No oropharyngeal exudate.   Eyes: Conjunctivae are normal. No scleral icterus.   Neck: Normal range of motion. Neck supple.   Cardiovascular: Regular rhythm and normal heart sounds. Tachycardia present. Exam reveals no gallop and no friction rub.   No murmur heard.  Rate    Pulmonary/Chest: Effort normal. No respiratory distress.   Decreased breath sounds left base   Abdominal: She exhibits distension. There is no tenderness. There is no guarding.   Firm     Musculoskeletal: She exhibits no edema.   Lymphadenopathy:     She has no cervical adenopathy.     She has no axillary adenopathy.        Right: No supraclavicular adenopathy present.        Left: No supraclavicular adenopathy present.   Neurological: She is alert.   LAO   Skin: Skin is warm and dry. She is not diaphoretic.   Evidence of healed rash left lower leg  IV and old IV site clear.  No evidence of thrombophlebitis     Psychiatric:   Appears very fatigued  Responds to questions appropriately   Vitals reviewed.      Significant Labs:   Blood Culture:   Recent Labs   Lab  08/14/18   1149  08/16/18   0214  08/16/18   0221  08/17/18   2236   LABBLOO  No growth after 5 days.  No growth after 5 days.  No Growth to date  No Growth to date  No Growth to date  No Growth to date  No Growth to date  No Growth to date  No Growth to date  No Growth to date  No Growth to date  No Growth to date  No Growth to date  No Growth to date  No Growth to date  No Growth to date  No Growth to date  No Growth to date     CBC:   Recent Labs   Lab  08/19/18   0358  08/20/18   0748  08/20/18   0924   WBC   1.58*  1.88*  1.74*   HGB  8.4*  10.0*  10.0*   HCT  25.1*  29.6*  29.4*   PLT  43*  48*  43*     CMP:   Recent Labs   Lab  08/19/18   0358  08/20/18   0748   NA  128*  127*   K  3.9  3.9   CL  102  99   CO2  17*  18*   GLU  145*  189*   BUN  48*  45*   CREATININE  1.0  1.2   CALCIUM  8.3*  8.7   PROT  5.0*  5.7*   ALBUMIN  3.3*  3.7   BILITOT  2.0*  2.0*   ALKPHOS  464*  480*   AST  229*  209*   ALT  133*  137*   ANIONGAP  9  10   EGFRNONAA  57.6*  46.2*     Lactic Acid:   Recent Labs   Lab  08/20/18   0924   LACTATE  2.5*     Urine Culture:   Recent Labs   Lab  08/16/18   0548   LABURIN  KLEBSIELLA PNEUMONIAE ESBL  >100,000 cfu/ml    ENTEROCOCCUS FAECIUM  > 100,000 cfu/ml       Urine Studies:   Recent Labs   Lab  08/17/18   2315   COLORU  Stacy   APPEARANCEUA  Clear   PHUR  6.0   SPECGRAV  1.020   PROTEINUA  Negative   GLUCUA  1+*   KETONESU  Negative   BILIRUBINUA  Negative   OCCULTUA  Negative   NITRITE  Negative   UROBILINOGEN  2.0   LEUKOCYTESUR  2+*   RBCUA  1   WBCUA  30*   BACTERIA  Few*   SQUAMEPITHEL  5       Significant Imaging: I have reviewed all pertinent imaging results/findings within the past 24 hours.

## 2018-08-20 NOTE — ASSESSMENT & PLAN NOTE
67 y/o female with past medical history of Hep B and D cirrhosis, on entecavir with undetectable Hep B DNA, c/b EV with bleeding, HE,  Previous H/O PV thrombosis, and refractory ascites, T2DM, who presented as a transfer from [facility] for evaluation of liver transplant surgery. The patient has been declining recently with hyponatremia.     MRI/USS done at [facility] shows patent PV vein. She appears to be a suitable candidate for liver transplant.    Surgical Complexity A  Medical Complexity A

## 2018-08-20 NOTE — CONSULTS
[facility]  Infectious Disease  Consult Note    Patient Name: Scarlett Reddy  MRN: [id number]  Admission Date: 8/14/2018  Hospital Length of Stay: 6 days  Attending Physician: Cristobal Rodrigues MD  Primary Care Provider: Primary Doctor No     Isolation Status: Contact        Inpatient consult to Infectious Diseases  Consult performed by: ASHOK Gonzalez, ANP  Consult ordered by: Cristobal Rodrigues MD  Reason for consult: pre-liver transplant - sepsis of unknown origin        ID Consult acknowledged.   Full consult and recommendations to follow.   In the interim, please call for any immediate concerns.       Thank you.   ASHOK Headley, ANP-C  [phone] pager,  [phone]

## 2018-08-21 ENCOUNTER — DOCUMENTATION ONLY (OUTPATIENT)
Dept: CARDIOLOGY | Facility: CLINIC | Age: 68
End: 2018-08-21

## 2018-08-21 ENCOUNTER — TELEPHONE (OUTPATIENT)
Dept: UROGYNECOLOGY | Facility: CLINIC | Age: 68
End: 2018-08-21

## 2018-08-21 LAB
ALBUMIN SERPL BCP-MCNC: 3.9 G/DL
ALP SERPL-CCNC: 346 U/L
ALT SERPL W/O P-5'-P-CCNC: 110 U/L
ANION GAP SERPL CALC-SCNC: 11 MMOL/L
ANISOCYTOSIS BLD QL SMEAR: SLIGHT
APPEARANCE FLD: NORMAL
AST SERPL-CCNC: 203 U/L
BACTERIA BLD CULT: NORMAL
BACTERIA BLD CULT: NORMAL
BASOPHILS # BLD AUTO: 0 K/UL
BASOPHILS NFR BLD: 0 %
BILIRUB SERPL-MCNC: 1.6 MG/DL
BODY FLD TYPE: NORMAL
BUN SERPL-MCNC: 53 MG/DL
CALCIUM SERPL-MCNC: 8.4 MG/DL
CHLORIDE SERPL-SCNC: 99 MMOL/L
CO2 SERPL-SCNC: 16 MMOL/L
COLOR FLD: NORMAL
CREAT SERPL-MCNC: 1.1 MG/DL
DIASTOLIC DYSFUNCTION: NO
DIFFERENTIAL METHOD: ABNORMAL
EOSINOPHIL # BLD AUTO: 0 K/UL
EOSINOPHIL NFR BLD: 0.8 %
ERYTHROCYTE [DISTWIDTH] IN BLOOD BY AUTOMATED COUNT: 19.2 %
EST. GFR  (AFRICAN AMERICAN): 59.2 ML/MIN/1.73 M^2
EST. GFR  (NON AFRICAN AMERICAN): 51.3 ML/MIN/1.73 M^2
ESTIMATED PA SYSTOLIC PRESSURE: 21.49
GLUCOSE SERPL-MCNC: 145 MG/DL
HCT VFR BLD AUTO: 21.3 %
HGB BLD-MCNC: 7.4 G/DL
HYPOCHROMIA BLD QL SMEAR: ABNORMAL
IMM GRANULOCYTES # BLD AUTO: 0 K/UL
IMM GRANULOCYTES NFR BLD AUTO: 0 %
INR PPP: 1.2
LACTATE SERPL-SCNC: 1.5 MMOL/L
LYMPHOCYTES # BLD AUTO: 0.5 K/UL
LYMPHOCYTES NFR BLD: 38.6 %
LYMPHOCYTES NFR FLD MANUAL: 70 %
MAGNESIUM SERPL-MCNC: 2.4 MG/DL
MCH RBC QN AUTO: 31.1 PG
MCHC RBC AUTO-ENTMCNC: 34.7 G/DL
MCV RBC AUTO: 90 FL
MITRAL VALVE MOBILITY: NORMAL
MONOCYTES # BLD AUTO: 0.2 K/UL
MONOCYTES NFR BLD: 16.5 %
MONOS+MACROS NFR FLD MANUAL: 28 %
NEUTROPHILS # BLD AUTO: 0.6 K/UL
NEUTROPHILS NFR BLD: 44.1 %
NEUTROPHILS NFR FLD MANUAL: 2 %
NRBC BLD-RTO: 0 /100 WBC
OVALOCYTES BLD QL SMEAR: ABNORMAL
PHOSPHATE SERPL-MCNC: 2.7 MG/DL
PHOSPHATIDYLETHANOL (PETH): NEGATIVE NG/ML
PLATELET # BLD AUTO: 35 K/UL
PMV BLD AUTO: 11.9 FL
POCT GLUCOSE: 134 MG/DL (ref 70–110)
POCT GLUCOSE: 138 MG/DL (ref 70–110)
POCT GLUCOSE: 231 MG/DL (ref 70–110)
POIKILOCYTOSIS BLD QL SMEAR: SLIGHT
POLYCHROMASIA BLD QL SMEAR: ABNORMAL
POTASSIUM SERPL-SCNC: 4 MMOL/L
PROT SERPL-MCNC: 5.2 G/DL
PROTHROMBIN TIME: 12.2 SEC
RBC # BLD AUTO: 2.38 M/UL
RETIRED EF AND QEF - SEE NOTES: 65 (ref 55–65)
SCHISTOSOMA IGG SER QL: NEGATIVE
SODIUM SERPL-SCNC: 126 MMOL/L
SODIUM UR-SCNC: <20 MMOL/L
TRICUSPID VALVE REGURGITATION: NORMAL
WBC # BLD AUTO: 1.27 K/UL
WBC # FLD: 81 /CU MM

## 2018-08-21 PROCEDURE — 87075 CULTR BACTERIA EXCEPT BLOOD: CPT | Mod: NTX

## 2018-08-21 PROCEDURE — 84100 ASSAY OF PHOSPHORUS: CPT | Mod: NTX

## 2018-08-21 PROCEDURE — 85025 COMPLETE CBC W/AUTO DIFF WBC: CPT | Mod: NTX

## 2018-08-21 PROCEDURE — 85610 PROTHROMBIN TIME: CPT | Mod: NTX

## 2018-08-21 PROCEDURE — 84300 ASSAY OF URINE SODIUM: CPT | Mod: NTX

## 2018-08-21 PROCEDURE — 87070 CULTURE OTHR SPECIMN AEROBIC: CPT | Mod: NTX

## 2018-08-21 PROCEDURE — 89051 BODY FLUID CELL COUNT: CPT | Mod: NTX

## 2018-08-21 PROCEDURE — 25000003 PHARM REV CODE 250: Mod: NTX | Performed by: HOSPITALIST

## 2018-08-21 PROCEDURE — P9047 ALBUMIN (HUMAN), 25%, 50ML: HCPCS | Mod: JG,NTX | Performed by: HOSPITALIST

## 2018-08-21 PROCEDURE — 36415 COLL VENOUS BLD VENIPUNCTURE: CPT | Mod: NTX

## 2018-08-21 PROCEDURE — 0W9G3ZZ DRAINAGE OF PERITONEAL CAVITY, PERCUTANEOUS APPROACH: ICD-10-PCS | Performed by: RADIOLOGY

## 2018-08-21 PROCEDURE — 83735 ASSAY OF MAGNESIUM: CPT | Mod: NTX

## 2018-08-21 PROCEDURE — 80053 COMPREHEN METABOLIC PANEL: CPT | Mod: NTX

## 2018-08-21 PROCEDURE — 99233 SBSQ HOSP IP/OBS HIGH 50: CPT | Mod: NTX,,, | Performed by: HOSPITALIST

## 2018-08-21 PROCEDURE — 93320 DOPPLER ECHO COMPLETE: CPT | Mod: NTX

## 2018-08-21 PROCEDURE — 63600175 PHARM REV CODE 636 W HCPCS: Mod: NTX | Performed by: NURSE PRACTITIONER

## 2018-08-21 PROCEDURE — 99222 1ST HOSP IP/OBS MODERATE 55: CPT | Mod: NTX,,, | Performed by: INTERNAL MEDICINE

## 2018-08-21 PROCEDURE — 20600001 HC STEP DOWN PRIVATE ROOM: Mod: NTX

## 2018-08-21 PROCEDURE — 93320 DOPPLER ECHO COMPLETE: CPT | Mod: 26,NTX,, | Performed by: INTERNAL MEDICINE

## 2018-08-21 PROCEDURE — 93325 DOPPLER ECHO COLOR FLOW MAPG: CPT | Mod: 26,NTX,, | Performed by: INTERNAL MEDICINE

## 2018-08-21 PROCEDURE — 83605 ASSAY OF LACTIC ACID: CPT | Mod: NTX

## 2018-08-21 PROCEDURE — 63600175 PHARM REV CODE 636 W HCPCS: Mod: JG,NTX | Performed by: HOSPITALIST

## 2018-08-21 PROCEDURE — 93351 STRESS TTE COMPLETE: CPT | Mod: 26,NTX,, | Performed by: INTERNAL MEDICINE

## 2018-08-21 PROCEDURE — 94761 N-INVAS EAR/PLS OXIMETRY MLT: CPT | Mod: NTX

## 2018-08-21 RX ORDER — ALBUMIN HUMAN 250 G/1000ML
25 SOLUTION INTRAVENOUS EVERY 6 HOURS
Status: COMPLETED | OUTPATIENT
Start: 2018-08-21 | End: 2018-08-21

## 2018-08-21 RX ADMIN — ALBUMIN HUMAN 25 G: 0.25 SOLUTION INTRAVENOUS at 02:08

## 2018-08-21 RX ADMIN — SODIUM CHLORIDE 1000 ML: 0.9 INJECTION, SOLUTION INTRAVENOUS at 08:08

## 2018-08-21 RX ADMIN — MIDODRINE HYDROCHLORIDE 15 MG: 5 TABLET ORAL at 09:08

## 2018-08-21 RX ADMIN — MIDODRINE HYDROCHLORIDE 15 MG: 5 TABLET ORAL at 03:08

## 2018-08-21 RX ADMIN — INSULIN ASPART 15 UNITS: 100 INJECTION, SOLUTION INTRAVENOUS; SUBCUTANEOUS at 03:08

## 2018-08-21 RX ADMIN — ALBUMIN HUMAN 25 G: 0.25 SOLUTION INTRAVENOUS at 08:08

## 2018-08-21 RX ADMIN — INSULIN ASPART 1 UNITS: 100 INJECTION, SOLUTION INTRAVENOUS; SUBCUTANEOUS at 09:08

## 2018-08-21 RX ADMIN — VANCOMYCIN HYDROCHLORIDE 750 MG: 750 INJECTION, POWDER, LYOPHILIZED, FOR SOLUTION INTRAVENOUS at 04:08

## 2018-08-21 RX ADMIN — ALBUMIN HUMAN 25 G: 0.25 SOLUTION INTRAVENOUS at 11:08

## 2018-08-21 RX ADMIN — ENTECAVIR 0.5 MG: 0.5 TABLET, FILM COATED ORAL at 03:08

## 2018-08-21 RX ADMIN — LACTULOSE 30 G: 20 SOLUTION ORAL at 09:08

## 2018-08-21 RX ADMIN — LACTULOSE 30 G: 20 SOLUTION ORAL at 04:08

## 2018-08-21 RX ADMIN — SODIUM BICARBONATE 650 MG TABLET 1300 MG: at 03:08

## 2018-08-21 RX ADMIN — ERTAPENEM SODIUM 1 G: 1 INJECTION, POWDER, LYOPHILIZED, FOR SOLUTION INTRAMUSCULAR; INTRAVENOUS at 03:08

## 2018-08-21 RX ADMIN — ENOXAPARIN SODIUM 50 MG: 100 INJECTION SUBCUTANEOUS at 09:08

## 2018-08-21 NOTE — PLAN OF CARE
Problem: Diabetes, Type 2 (Adult)  Intervention: Optimize Glycemic Control  Patient and spouse made aware thru  s/s of hyper or hypoglycemia. Report to nurse

## 2018-08-21 NOTE — CONSULTS
Consult Note    Inpatient consult to Hematology/Oncology  Consult performed by: Kelli Garcia MD  Consult ordered by: Cristobal Rodrigues MD        SUBJECTIVE:     History of Present Illness:  Scarlett Reddy is a 68-year-old female with cirrhosis due to hepatitis B and D (on Entecavir) with varices, anemia, DM2, history of portal vein thrombosis on lovenox, who presented on 8/14/18 with confusion and decompensated liver cirrhosis, admitted for expedited liver transplant evaluation. The patient is currently being treated for ESBL UTI.    The hematology consult has been consulted for evaluation of neutropenia/pancytopenia. Review of CBC shows:  -8/14/18: WBC 4.19, Hb 9.7, plt 85, ANC 2900  -8/15/18: 3.38/8.7/65  -8/16/18: 2.81/6.8/58  -8/17/18: 2.09/8.6/49  -8/18/18: 1.86/9.2/50  -8/19/18: 1.58/8.4/43   -8/20/18: 1.88/10.0/48 ANC 1000  -8/21/18: 1.27/7.4/35     The patient is on Lovenox, Entecavir, Ertapenem, Vancomycin. The patient was seen at bedside with her ,  services with [facility] were used. The patient denied any pain or acute complaints, she has just returned from a paracentesis.    Review of patient's allergies indicates:  No Known Allergies  Past Medical History:   Diagnosis Date    Ascites     Cirrhosis     Esophageal varices     Hepatic encephalopathy     Hepatitis B     Hepatitis D virus infection     Hypersplenism     Portal hypertension      History reviewed. No pertinent surgical history.  History reviewed. No pertinent family history.  Social History     Tobacco Use    Smoking status: Unknown If Ever Smoked   Substance Use Topics    Alcohol use: No     Frequency: Never    Drug use: Not on file     Review of Systems   Constitutional: Negative for chills and fever.   HENT: Negative for nosebleeds and sore throat.    Eyes: Negative for blurred vision and double vision.   Respiratory: Negative for cough and hemoptysis.    Cardiovascular: Negative for chest pain and leg  swelling.   Gastrointestinal: Negative for abdominal pain, blood in stool, melena, nausea and vomiting.   Genitourinary: Negative for hematuria.   Musculoskeletal: Negative for joint pain and myalgias.   Skin: Negative for rash.   Neurological: Negative for dizziness, sensory change and headaches.   Endo/Heme/Allergies: Does not bruise/bleed easily.     OBJECTIVE:     Vital Signs:  Temp:  [97.6 °F (36.4 °C)-98.9 °F (37.2 °C)]   Pulse:  []   Resp:  [16-20]   BP: (86-91)/(50-52)   SpO2:  [98 %]     Physical Exam   Constitutional: She is oriented to person, place, and time. She appears well-developed and well-nourished.   Eyes: EOM are normal.   Neck: Normal range of motion.   Cardiovascular: Normal rate and regular rhythm.   Pulmonary/Chest: Effort normal. No respiratory distress.   Abdominal: Soft. She exhibits distension. There is no tenderness.   Musculoskeletal: She exhibits no edema.   Neurological: She is alert and oriented to person, place, and time.   Skin: Skin is warm and dry.   Psychiatric: She has a normal mood and affect. Her behavior is normal.     Laboratory:  CBC:   Recent Labs   Lab  08/21/18   0755   WBC  1.27*   RBC  2.38*   HGB  7.4*   HCT  21.3*   PLT  35*   MCV  90   MCH  31.1*   MCHC  34.7     CMP:   Recent Labs   Lab  08/21/18   0443   GLU  145*   CALCIUM  8.4*   ALBUMIN  3.9   PROT  5.2*   NA  126*   K  4.0   CO2  16*   CL  99   BUN  53*   CREATININE  1.1   ALKPHOS  346*   ALT  110*   AST  203*   BILITOT  1.6*     Coagulation:   Recent Labs   Lab  08/21/18   0442   LABPROT  12.2   INR  1.2     EXAMINATION:  US ABDOMEN COMP WITH DOPPLER_PRE LIVER TRANSPLANT    CLINICAL HISTORY:  worsening liver failure;    TECHNIQUE:  Complete abdominal ultrasound with doppler    COMPARISON:  Outside MRI abdomen from 04/07/2018.    FINDINGS:  The visualized portion of the pancreas is unremarkable.    The liver is normal in size measuring 12.4 cm.  The liver demonstrates nodular contour and heterogeneous  echotexture.  No focal hepatic lesions are seen.    The gallbladder demonstrates a 1.0 cm mobile gallstone in the gallbladder neck, multiple punctate hyper echogenic foci in the gallbladder wall suggestive of small adherent stones or adenomyomatosis, and a 0.7 cm focal isoechoic region near the fundus which may represent adherent sludge or polyp.  The common duct is not dilated, measuring 3 mm.  The gallbladder wall is not thickened.  There is no sonographic Burgess sign.  No dilated intrahepatic radicles are seen.    The spleen is enlarged measuring 12.9 x 4.8 cm with a homogeneous echotexture.    The aorta tapers normally.    The kidneys are normal in size without focal abnormality or evidence of hydronephrosis.    There is moderate volume ascites.    The main portal vein, right portal vein, left portal vein, middle hepatic vein, right hepatic vein, left hepatic vein, SMV, and IVC are patent with proper directional flow.  The main hepatic artery is patent with normal waveform.  The umbilical vein is not patent.  The Celiac artery is normal on expiration with a velocity of 178 cm/sec.      Impression       Satisfactory Doppler ultrasound of the liver.    Cirrhotic liver with evidence of portal hypertension including splenomegaly and ascites.    Cholelithiasis, possible adenomyomatosis, and adherent sludge versus gallbladder polyp.  Recommend continued ultrasound surveillance for possible polyp.         ASSESSMENT/PLAN:   IMPRESSION  1) Neutropenia. Today ANC is 600. This can be medication related, ertapenem is associated with neutropenia.  2) Pancytopenia, likely multifactorial from hypersplenism due to hepatitis/end-stage liver disease in setting of sepsis from UTI. Ddx includes DIC, hemolysis, nutritional deficiency, medication related (low probability for GOLD, patient has been on lovenox). HIV was negative. Today WBC 1.27, Hb 7.4, plt 35.   3) ESBL UTI  4) Decompensated liver cirrhosis in setting of hepatitis B  and D infection  5) History of portal vein thrombosis    RECOMMENDATIONS  -Daily CBC with differential  -Order LDH, haptoglobin, reticulocyte count, peripheral blood smear   -Order iron studies (ferritin, TIBC, transferrin saturation) and B12/folate  -Repeat fibrinogen  -Transfuse for Hb<7, plt<10 or bleeding  -If there are no contraindications, can start Neupogen 5 mcg/kg once daily    Will follow along. The following has been staffed and discussed with supervising physician Dr. Rodriguez. Please call [facility] number [phone] between 8AM-5PM with questions, otherwise page the Consult fellow on call.     Kelli Garcia MD  Hematology/Oncology Fellow

## 2018-08-21 NOTE — TREATMENT PLAN
Hepatology Treatment Plan  08/21/2018  1:10 PM    Chart reviewed and patient discussed on rounds but now seen as she was undergoing stress test. For today our recommendations are:  --Continue current antibiotics  --Obtain CXR and repeat paracentesis  --Obtain repeat Hep B DNA and lactic acid  --Obtain urine lytes (in particular urine sodium and osmolarity)  --Dobutamine stress test   --Will continue to follow alongside primary team, please call if there are any additional questions.    Michelle Leone M.D.  Gastroenterology Fellow, PGY-V  Pager: [phone]  [facility]

## 2018-08-21 NOTE — PROGRESS NOTES
Paracentesis complete. 4700 mLs peritoneal fluid drained. Pt tolerated well. Dressing to llq clean, dry, and intact. Albumin 25% given 100 mLs. Specimens sent per lab order. Report called to floor nurse, VALENTINA Robledo.

## 2018-08-21 NOTE — SUBJECTIVE & OBJECTIVE
Interval History: No acute events overnight.  Afebrile.   Neutropenic.    Blood cultures NGTD.    Paracentesis yesterday - cell count negative for SBP.  Cultures NGTD   New urine culture - yeast (10K -49K).   Complaining of nausea, vomiting, diarrhea, and reflux today.  Dry cough.  Denies fevers, SOB      Review of Systems   Constitutional: Positive for activity change and fatigue. Negative for appetite change, chills, diaphoresis and fever.   HENT: Negative for congestion, dental problem, sinus pressure, sinus pain, sneezing, sore throat and trouble swallowing.    Eyes: Negative.    Respiratory: Positive for cough (dry, intermittent). Negative for shortness of breath and wheezing.    Cardiovascular: Negative for chest pain, palpitations and leg swelling.   Gastrointestinal: Positive for abdominal distention, diarrhea (intermittent with lactulose), nausea and vomiting. Negative for abdominal pain.   Genitourinary: Negative for difficulty urinating, dysuria, flank pain, frequency, hematuria and urgency.   Musculoskeletal: Negative for arthralgias, back pain and neck pain.   Skin: Negative for color change.   Neurological: Negative for dizziness and weakness.   Hematological: Negative for adenopathy. Bruises/bleeds easily.   Psychiatric/Behavioral: Negative for decreased concentration. The patient is not nervous/anxious.      Objective:     Vital Signs (Most Recent):  Temp: 98.9 °F (37.2 °C) (08/21/18 0800)  Pulse: 95 (08/21/18 0800)  Resp: 16 (08/21/18 0800)  BP: (!) 91/50 (08/21/18 0800)  SpO2: 98 % (08/21/18 0800) Vital Signs (24h Range):  Temp:  [97.6 °F (36.4 °C)-98.9 °F (37.2 °C)] 98.9 °F (37.2 °C)  Pulse:  [] 95  Resp:  [16-20] 16  SpO2:  [96 %-99 %] 98 %  BP: ()/(42-54) 91/50     Weight: 55.2 kg (121 lb 9.6 oz)  Body mass index is 20.24 kg/m².    Estimated Creatinine Clearance: 42.1 mL/min (based on SCr of 1.1 mg/dL).    Physical Exam   Constitutional: No distress.   Chronically ill  appearing   at bedside     HENT:   Head: Normocephalic.   Mouth/Throat: She does not have dentures. No oral lesions. Normal dentition. No oropharyngeal exudate.   Eyes: Conjunctivae are normal. No scleral icterus.   Neck: Normal range of motion. Neck supple.   Cardiovascular: Normal rate, regular rhythm and normal heart sounds. Exam reveals no gallop and no friction rub.   No murmur heard.  Pulmonary/Chest: Effort normal. No respiratory distress.   Abdominal: She exhibits distension. There is no tenderness. There is no guarding.   Firm     Musculoskeletal: She exhibits no edema.   Lymphadenopathy:     She has no cervical adenopathy.     She has no axillary adenopathy.        Right: No supraclavicular adenopathy present.        Left: No supraclavicular adenopathy present.   Neurological: She is alert.   LAO   Skin: Skin is warm and dry. She is not diaphoretic.   Evidence of healed rash left lower leg  IV and old IV site clear.  No evidence of thrombophlebitis     Psychiatric:   Appears very fatigued  Responds to questions appropriately   Vitals reviewed.      Significant Labs:   Blood Culture:   Recent Labs   Lab  08/14/18   1149  08/16/18   0214  08/16/18   0221  08/17/18   2236   LABBLOO  No growth after 5 days.  No growth after 5 days.  No growth after 5 days.  No growth after 5 days.  No Growth to date  No Growth to date  No Growth to date  No Growth to date  No Growth to date  No Growth to date  No Growth to date  No Growth to date     CBC:   Recent Labs   Lab  08/20/18   0748  08/20/18   0924  08/21/18   0755   WBC  1.88*  1.74*  1.27*   HGB  10.0*  10.0*  7.4*   HCT  29.6*  29.4*  21.3*   PLT  48*  43*   --      CMP:   Recent Labs   Lab  08/20/18   0748  08/21/18   0443   NA  127*  126*   K  3.9  4.0   CL  99  99   CO2  18*  16*   GLU  189*  145*   BUN  45*  53*   CREATININE  1.2  1.1   CALCIUM  8.7  8.4*   PROT  5.7*  5.2*   ALBUMIN  3.7  3.9   BILITOT  2.0*  1.6*   ALKPHOS  480*  346*   AST   209*  203*   ALT  137*  110*   ANIONGAP  10  11   EGFRNONAA  46.2*  51.3*     Lactic Acid:   Recent Labs   Lab  08/20/18   0924  08/21/18   0926   LACTATE  2.5*  1.5     Urine Culture:   Recent Labs   Lab  08/16/18   0548   LABURIN  KLEBSIELLA PNEUMONIAE ESBL  >100,000 cfu/ml    ENTEROCOCCUS FAECIUM  > 100,000 cfu/ml       Urine Studies:   Recent Labs   Lab  08/17/18   2315   COLORU  Stacy   APPEARANCEUA  Clear   PHUR  6.0   SPECGRAV  1.020   PROTEINUA  Negative   GLUCUA  1+*   KETONESU  Negative   BILIRUBINUA  Negative   OCCULTUA  Negative   NITRITE  Negative   UROBILINOGEN  2.0   LEUKOCYTESUR  2+*   RBCUA  1   WBCUA  30*   BACTERIA  Few*   SQUAMEPITHEL  5       Significant Imaging: I have reviewed all pertinent imaging results/findings within the past 24 hours.

## 2018-08-21 NOTE — TELEPHONE ENCOUNTER
Called pt no answer, Left voice message for pt to give the office a call back at [phone] to rescheduled missed apt.

## 2018-08-21 NOTE — PLAN OF CARE
Problem: Patient Care Overview  Goal: Plan of Care Review  Outcome: Ongoing (interventions implemented as appropriate)  POC reviewed w/patient and son. Questions and concerns addressed. Both son and patient expressed concerns regarding ascites getting worse and pt. Becoming more deconditioned. Encouraged to have talk with MD regarding same. CM = SR/ST. No c/o pain. Incont of urine. NPO since MN for possible stress this am. Safety maintained. Bed in low low and locked position. Call light within reach. Side rails up x2. Isolation maintained. Frequent rounds.

## 2018-08-21 NOTE — PROGRESS NOTES
Patient verified by 2 identifiers and allergies reviewed.  18g IV in place to Lt wrist.  DSE explained to patient, consent obtained & testing completed.  Pt tolerated testing well. IV removed post testing.  Post study discharge instructions reviewed with patient, patient verbalized understanding.  Patient transferred back to room via stretcher accompanied by family,  & transport in stable condition.

## 2018-08-21 NOTE — PLAN OF CARE
Problem: Patient Care Overview  Goal: Plan of Care Review  Outcome: Ongoing (interventions implemented as appropriate)   in room because of lanuage barrier.  Patient was  NPO for Stress.  Para done removed 4.7L milky pinkish fluids reported. .  B/p-91/50- 80/46. No s/s distress. Blood glucose WNL's. Remain IVPB as ordered. WBC-1.74. Need Urine specimen. ID consulted. Remain Fluid restriction. Spouse at bedside

## 2018-08-21 NOTE — PROGRESS NOTES
Pt arrived to U 4 for paracentesis.  Name verified using two identifiers.  Allergies verified.  Will continue to monitor.

## 2018-08-21 NOTE — PHYSICIAN QUERY
PT Name: Scarlett Reddy  MR #: [id number]    Physician Query Form -Systemic Infectious Process Clarification     CDS Laura Torres RN, BSN        Contact Information:  [phone]    [email or web address]         This form is a permanent document in the medical record.     Query Date: August 21, 2018     By submitting this query, we are merely seeking further clarification of documentation. Please utilize your independent clinical judgment when addressing the question(s) below.    The Medical record contains the following:     Indicators   Supporting Clinical Findings   Location in Medical Record   X   HR RR BP Temp BP: 90/43, P: 84, Resp: 17, Temp: 98.5  ED Vitals   X   Lactic Acid             Procalcitonin Lactic Acid:  2.5 --> 2.0--> 1.9--> 2.5 --> 1.5   Procal:  0.25 Labs   8/15 --> 8/21   X   WBC                Bands                     CRP WBC:  4.19 -->3.38-->2.81-->2.09-->1.93-->1.86  -->1.58-->1.74-->1.27  Labs  8/14 -->8/21   X   Culture(s) KLEBSIELLA PNEUMONIAE ESBL   >100,000 cfu/ml      ENTEROCOCCUS FAECIUM   > 100,000 cfu/ml    Urine culture 8/16    AMS, Confusion, LOC, etc.      Organ Dysfunction / Failure     X   Bacteremia or Sepsis / Septic sepsis    ID is now re-consulted for evaluation of suspected sepsis given hypotension and tachycardia. ER MD notes    8/20 ID PN filed @ 8:37pm    X   Known or Suspected Source of Infection documented Urine culture - ESBL K. Pneumo and E. Faecium (vanc sensitive), though patient asymptomatic. 8/20 ID PN     (Failed) Outpatient Treatment     X   Medication ertapenem (INVANZ) 1 g in sodium chloride 0.9 % 100 mL IVPB @1549    vancomycin 750 mg in dextrose 5 % 250 mL IVPB @0000, 0908,0915    cefTRIAXone injection 1 g @0002, 2333    ciprofloxacin HCl tablet 500 mg @0825, 2115, 0849     MAR 8/20      MAR 8/19 -- 8/21      MAR 8/15     MAR 8/19--> 8/20    Treatment      Other       Provider, please specify diagnosis or diagnoses associated with above clinical  findings.    [ x ] Sepsis -- related to organism, please specify ______________________________    [ ]  Sepsis Ruled Out     [  ] Severe Sepsis with Acute Organ Dysfunction/Failure (please specify         organ dysfunction/failure): ___________________    [  ] Other Infectious Disease (please specify): _________________________________    [  ] Other: __________________________________    [  ] Clinically Undetermined    Please document in your progress notes daily for the duration of treatment until resolved and include in your discharge summary.

## 2018-08-21 NOTE — H&P
Radiology History & Physical      SUBJECTIVE:     Chief Complaint: ascites    History of Present Illness:  Scarlett Reddy is a 68 y.o. female who presents for para    Past Medical History:   Diagnosis Date    Ascites     Cirrhosis     Esophageal varices     Hepatic encephalopathy     Hepatitis B     Hepatitis D virus infection     Hypersplenism     Portal hypertension        History reviewed. No pertinent surgical history.    Home Meds:   Prior to Admission medications    Medication Sig Start Date End Date Taking? Authorizing Provider   blood sugar diagnostic (ACCU-CHEK DC) Strp 1 strip by Misc.(Non-Drug; Combo Route) route 3 (three) times daily.   Yes Historical Provider, MD   enoxaparin (LOVENOX) 40 mg/0.4 mL Syrg Inject 40 mg into the skin every evening.    Yes Historical Provider, MD   entecavir (BARACLUDE) 0.5 MG Tab Take 0.5 mg by mouth once daily.   Yes Historical Provider, MD   insulin aspart U-100 (NOVOLOG) 100 unit/mL injection Inject into the skin 3 (three) times daily before meals. 10-17 units sliding scale with meals   Yes Historical Provider, MD   insulin glargine (LANTUS) 100 unit/mL injection Inject 24 Units into the skin every evening.    Yes Historical Provider, MD   lactulose (CHRONULAC) 10 gram/15 mL solution Take 30 mLs by mouth once daily.    Yes Historical Provider, MD   propranolol (INDERAL) 10 MG tablet Take 10 mg by mouth once daily.   Yes Historical Provider, MD   propranolol (INDERAL) 60 MG tablet Take 60 mg by mouth once daily.   Yes Historical Provider, MD       Anticoagulants/Antiplatelets: no anticoagulation    Allergies: Review of patient's allergies indicates:  No Known Allergies    Sedation History:  no adverse reactions    Review of Systems:   As documented in primary provider H&P.      OBJECTIVE:     Vital Signs (Most Recent)  Temp: 98.9 °F (37.2 °C) (08/21/18 0800)  Pulse: 98 (08/21/18 1321)  Resp: 16 (08/21/18 1321)  BP: (!) 92/53 (08/21/18 1321)  SpO2: 99 % (08/21/18  8773)    Physical Exam:  ASA: 3  Mallampati: 1      Laboratory  Lab Results   Component Value Date    INR 1.2 08/21/2018       Lab Results   Component Value Date    WBC 1.27 (LL) 08/21/2018    HGB 7.4 (L) 08/21/2018    HCT 21.3 (L) 08/21/2018    MCV 90 08/21/2018    PLT 35 (LL) 08/21/2018      Lab Results   Component Value Date     (H) 08/21/2018     (L) 08/21/2018    K 4.0 08/21/2018    CL 99 08/21/2018    CO2 16 (L) 08/21/2018    BUN 53 (H) 08/21/2018    CREATININE 1.1 08/21/2018    CALCIUM 8.4 (L) 08/21/2018    MG 2.4 08/21/2018     (H) 08/21/2018     (H) 08/21/2018    ALBUMIN 3.9 08/21/2018    BILITOT 1.6 (H) 08/21/2018       ASSESSMENT/PLAN:     Sedation Plan: local only  Patient will undergo para    Johann Miller MD  Department of Radiology  Pager: [phone]

## 2018-08-21 NOTE — PROGRESS NOTES
Hospital Medicine  Progress note    Team: [facility] HOSP MED L Cristobal Rodrigues MD  Admit Date: 8/14/2018  AJIT 8/22/2018  Code status: Full Code    Principal Problem:  Pre-transplant evaluation for chronic liver disease    Interval hx: Patient seen and examined at bedside, no acute events overnight. Dobutamine stress test performed today, negative for ischemia, IR paracentesis removed 4.7 liters.  jennifer at bedside used for interpretation.     ROS   Review of Systems   Constitutional: Negative for chills and fever.   HENT: Negative for ear discharge.    Eyes: Negative for blurred vision and double vision.   Cardiovascular: Negative for chest pain.   Gastrointestinal: Negative for nausea and vomiting.        Abdominal distention    Genitourinary: Negative for dysuria and urgency.   Musculoskeletal: Negative.    Skin: Negative for rash.   Neurological: Negative for tremors, sensory change and speech change.   Psychiatric/Behavioral: Negative.          PEx  Temp:  [97.6 °F (36.4 °C)-98.9 °F (37.2 °C)]   Pulse:  []   Resp:  [16-20]   BP: ()/(42-54)   SpO2:  [96 %-100 %]     Intake/Output Summary (Last 24 hours) at 8/21/2018 1613  Last data filed at 8/21/2018 1447  Gross per 24 hour   Intake 300 ml   Output 4700 ml   Net -4400 ml     Physical Exam   Constitutional: She is oriented to person, place, and time and well-developed, well-nourished, and in no distress.   HENT:   Head: Normocephalic and atraumatic.   Eyes: Pupils are equal, round, and reactive to light.   Neck: Normal range of motion. Neck supple. No thyromegaly present.   Cardiovascular: Normal rate, regular rhythm and normal heart sounds.   Pulmonary/Chest: Effort normal and breath sounds normal.   Abdominal: Soft. Bowel sounds are normal. She exhibits distension. There is no tenderness. There is no guarding.   Musculoskeletal: Normal range of motion.   Neurological: She is alert and oriented to person, place, and time.   Skin: Skin is warm and  dry. No rash noted. No erythema.     Recent Labs   Lab  08/20/18   0748  08/20/18   0924  08/21/18   0755   WBC  1.88*  1.74*  1.27*   HGB  10.0*  10.0*  7.4*   HCT  29.6*  29.4*  21.3*   PLT  48*  43*  35*     Recent Labs   Lab  08/19/18   0358  08/20/18   0748  08/21/18   0443   NA  128*  127*  126*   K  3.9  3.9  4.0   CL  102  99  99   CO2  17*  18*  16*   BUN  48*  45*  53*   CREATININE  1.0  1.2  1.1   GLU  145*  189*  145*   CALCIUM  8.3*  8.7  8.4*   MG  2.1  2.0  2.4   PHOS  3.2  3.0  2.7     Recent Labs   Lab  08/19/18   0358  08/20/18   0748  08/21/18   0442  08/21/18   0443   ALKPHOS  464*  480*   --   346*   ALT  133*  137*   --   110*   AST  229*  209*   --   203*   ALBUMIN  3.3*  3.7   --   3.9   PROT  5.0*  5.7*   --   5.2*   BILITOT  2.0*  2.0*   --   1.6*   INR  1.2  1.1  1.2   --       Recent Labs   Lab  08/20/18   0806  08/20/18   1139  08/20/18   1635  08/20/18   2059  08/21/18   0903  08/21/18   1516   POCTGLUCOSE  194*  231*  249*  202*  134*  138*     No results for input(s): CPK, CPKMB, MB, TROPONINI in the last 72 hours.    Scheduled Meds:   albumin human 25%  25 g Intravenous Q6H    entecavir  0.5 mg Oral Daily    ertapenem (INVANZ) IVPB  1 g Intravenous Q24H    insulin aspart U-100  15 Units Subcutaneous TIDWM    insulin detemir U-100  20 Units Subcutaneous QHS    lactulose  30 g Oral BID    midodrine  15 mg Oral TID    sodium bicarbonate  1,300 mg Oral TID    vancomycin (VANCOCIN) IVPB  750 mg Intravenous Q24H     Continuous Infusions:  As Needed:  dextrose 50%, dextrose 50%, glucagon (human recombinant), glucose, glucose, insulin aspart U-100    Active Hospital Problems    Diagnosis  POA    *Pre-transplant evaluation for chronic liver disease [Z01.818]  Not Applicable    Hepatic encephalopathy [K72.90]  Yes     Priority: 1 - High    Pre-transplant evaluation for liver transplant [Z01.818]  Not Applicable    Severe malnutrition [E43]  Yes    Anemia requiring transfusions  [D64.9]  No    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 [B18.0, K74.60]  Yes    Type 2 diabetes mellitus, with long-term current use of insulin [E11.9, Z79.4]  Not Applicable    Esophageal varices without bleeding [I85.00]  Yes    Liver transplant candidate [Z76.82]  Not Applicable      Resolved Hospital Problems   No resolved problems to display.       Overview    67 yo F with with above mentioned medical history presents with confusion and decompensated liver cirrhosis, admitted for expedited liver transplant evaluation    Assessment and Plan for Problems addressed today:    Hepatic Encephalopathy  - ammonia levels high at 67 on admission  - Started on PO Lactulose, one time rectal lactulose given in the ER  - AAO X 3      Decompensated Liver cirrhosis due to Hep B And D  MELD-Na score: 11 at 8/21/2018  4:43 AM  MELD score: 11 at 8/21/2018  4:43 AM  Calculated from:  Serum Creatinine: 1.1 mg/dL at 8/21/2018  4:43 AM  Serum Sodium: 126 mmol/L at 8/21/2018  4:43 AM  Total Bilirubin: 1.6 mg/dL at 8/21/2018  4:43 AM  INR(ratio): 1.2 at 8/21/2018  4:42 AM  Age: 68 years  - Hold beta blockers due to hypotension  - IR paracentesis removed 4.7 liters  - UA/Urine culture  - blood cultures NGTD  - Fluid restriction  - US liver doppler  - Hepatology consult  - AFP 2.9  - Lactic acid 2.5 on admission, now 1.5  - PETH pending  - EGD on 08/17 negative for active bleeding, Portal HTN gastropathy and Grade I varices    ESBL/E faecium UTI   - contact precautions  - Started on IV ertapenem and IV Vancomycin by Infectious diseases   - repeat Urine culture pending   - ID consulted, for antibiotic guidance    Anemia requiring blood transfusions  - transfused one unit of PRBCs 8/16, responded well   - Hgb 7.4  monitor daily CBC   - EGD revealed no active bleeding    Pancytopenia  - several possible etiologies such as liver failure, medications  - Hematology consulted, appreciate recs   - hold lovenox due to  thrombocytopenia but may resume if plts improve      Metabolic acidosis  - VBG: metabolic acidosis with respiratory alkalosis, normal pH     Diabetes type 2  - POCT q ACHS  - low dose sliding scale   - HgbA1c 6.8  - cont insulin at night, 20 units, 10 units TID      Hyponatremia  - most likely hypervolemic  - Will give IV albumin trial  - fluid restriction 1000cc  - Urine sodium  - 124-- 131--128-->130-->131-->128-->127-->126     Portal Vein thrombosis  - US liver doppler: no evidence of thrombus   - hold Lovenox due to thrombocytopenia     Diet:  Low sodium  DVT PPx:    Anticoagulants   Medication Route Frequency       Goals of Care: Full code    Discharge plan and follow up: pending Liver transplant evaluation     Provider    Cristobal Melendez MD  Staff Hospitalist  Department of Hospital Medicine  [facility]   [phone]

## 2018-08-21 NOTE — PLAN OF CARE
ID Follow Up:      Attempted to see patient X 3 today, but not in room.   Chart reviewed.    Remains afebrile  Leukopenia ().   Blood cultures NGTD  New urine pending.   Still with low b/p.   On broad spectrum abx - ertapenem and vancomycin.   No clear source of infection.   Paracentesis pending for today.     Continue current antibiotics   Will follow up tomorrow.     Discussed with ID staff and Primary Team     Thank you.   Please call for any questions or concerns.  ASHOK Headley, ANP-C  [phone]

## 2018-08-21 NOTE — PROCEDURES
Radiology Post-Procedure Note    Pre Op Diagnosis: Ascites  Post Op Diagnosis: Same    Procedure: Paracentesis    Procedure performed by: Fernando Lund MD; Johann Miller MD (res)    Written Informed Consent Obtained: Yes  Specimen Removed: YES ascites  Estimated Blood Loss: Minimal    Findings:   Successful paracentesis.  Albumin administered PRN per protocol.    Patient tolerated procedure well.    Johann Miller MD  R1, Radiology  P: [phone]

## 2018-08-21 NOTE — PHYSICIAN QUERY
PT Name: Scarlett Reddy  MR #: [id number]     Physician Query Form - Documentation Clarification      CDS Laura Torres RN, BSN        Contact Information:  [phone]    [email or web address]           This form is a permanent document in the medical record.     Query Date: August 21, 2018    By submitting this query, we are merely seeking further clarification of documentation. Please utilize your independent clinical judgment when addressing the question(s) below.    The Medical record reflects the following:    Supporting Clinical Findings Location in Medical Record   Principal Problem: Hepatic Encephalopathy     H&P   PMH Of Hep B and D Cirrhosis      transfer from [facility] for inpatient evaluation of liver transplant    Son reports patient's mentation started to decline on the second leg of the trip from [place] To [place]    ER Course: rectal lactulose x 1    ammonia levels high at 67     H&P                                                                            Doctor, Please specify diagnosis     Hepatic encephalopathy   associated with above clinical findings.        __x___   Acute or subacute hepatic encephalopathy without coma    _____   Chronic hepatic encephalopathy without coma    _____   Clinically Undetermined

## 2018-08-22 ENCOUNTER — COMMITTEE REVIEW (OUTPATIENT)
Dept: TRANSPLANT | Facility: CLINIC | Age: 68
End: 2018-08-22

## 2018-08-22 LAB
ABO + RH BLD: NORMAL
ALBUMIN SERPL BCP-MCNC: 3.9 G/DL
ALBUMIN SERPL BCP-MCNC: 4.3 G/DL
ALP SERPL-CCNC: 254 U/L
ALP SERPL-CCNC: 317 U/L
ALT SERPL W/O P-5'-P-CCNC: 71 U/L
ALT SERPL W/O P-5'-P-CCNC: 93 U/L
ANION GAP SERPL CALC-SCNC: 8 MMOL/L
ANION GAP SERPL CALC-SCNC: 9 MMOL/L
ANION GAP SERPL CALC-SCNC: 9 MMOL/L
ANISOCYTOSIS BLD QL SMEAR: SLIGHT
AST SERPL-CCNC: 134 U/L
AST SERPL-CCNC: 187 U/L
BACTERIA UR CULT: NORMAL
BASOPHILS # BLD AUTO: 0 K/UL
BASOPHILS # BLD AUTO: 0.01 K/UL
BASOPHILS NFR BLD: 0 %
BASOPHILS NFR BLD: 0.8 %
BILIRUB SERPL-MCNC: 1.3 MG/DL
BILIRUB SERPL-MCNC: 3 MG/DL
BLD GP AB SCN CELLS X3 SERPL QL: NORMAL
BLD PROD TYP BPU: NORMAL
BLOOD UNIT EXPIRATION DATE: NORMAL
BLOOD UNIT TYPE CODE: 6200
BLOOD UNIT TYPE: NORMAL
BUN SERPL-MCNC: 40 MG/DL
BUN SERPL-MCNC: 40 MG/DL
BUN SERPL-MCNC: 43 MG/DL
CALCIUM SERPL-MCNC: 7.2 MG/DL
CALCIUM SERPL-MCNC: 8.2 MG/DL
CALCIUM SERPL-MCNC: 8.4 MG/DL
CHLORIDE SERPL-SCNC: 101 MMOL/L
CHLORIDE SERPL-SCNC: 105 MMOL/L
CHLORIDE SERPL-SCNC: 105 MMOL/L
CO2 SERPL-SCNC: 18 MMOL/L
CO2 SERPL-SCNC: 19 MMOL/L
CO2 SERPL-SCNC: 20 MMOL/L
CODING SYSTEM: NORMAL
CREAT SERPL-MCNC: 0.9 MG/DL
CREAT SERPL-MCNC: 1 MG/DL
CREAT SERPL-MCNC: 1.1 MG/DL
DIFFERENTIAL METHOD: ABNORMAL
DISPENSE STATUS: NORMAL
EOSINOPHIL # BLD AUTO: 0 K/UL
EOSINOPHIL NFR BLD: 1.1 %
EOSINOPHIL NFR BLD: 1.4 %
EOSINOPHIL NFR BLD: 1.4 %
EOSINOPHIL NFR BLD: 1.6 %
ERYTHROCYTE [DISTWIDTH] IN BLOOD BY AUTOMATED COUNT: 19 %
ERYTHROCYTE [DISTWIDTH] IN BLOOD BY AUTOMATED COUNT: 19.6 %
ERYTHROCYTE [DISTWIDTH] IN BLOOD BY AUTOMATED COUNT: 19.6 %
ERYTHROCYTE [DISTWIDTH] IN BLOOD BY AUTOMATED COUNT: 19.8 %
EST. GFR  (AFRICAN AMERICAN): 59.2 ML/MIN/1.73 M^2
EST. GFR  (AFRICAN AMERICAN): >60 ML/MIN/1.73 M^2
EST. GFR  (AFRICAN AMERICAN): >60 ML/MIN/1.73 M^2
EST. GFR  (NON AFRICAN AMERICAN): 51.3 ML/MIN/1.73 M^2
EST. GFR  (NON AFRICAN AMERICAN): 57.6 ML/MIN/1.73 M^2
EST. GFR  (NON AFRICAN AMERICAN): >60 ML/MIN/1.73 M^2
FERRITIN SERPL-MCNC: 2401 NG/ML
FIBRINOGEN PPP-MCNC: 162 MG/DL
FOLATE SERPL-MCNC: 13 NG/ML
GLUCOSE SERPL-MCNC: 221 MG/DL
GLUCOSE SERPL-MCNC: 234 MG/DL
GLUCOSE SERPL-MCNC: 252 MG/DL
HAPTOGLOB SERPL-MCNC: 27 MG/DL
HCT VFR BLD AUTO: 19.6 %
HCT VFR BLD AUTO: 24.8 %
HCT VFR BLD AUTO: 25.9 %
HCT VFR BLD AUTO: 25.9 %
HGB BLD-MCNC: 6.6 G/DL
HGB BLD-MCNC: 8.2 G/DL
HGB BLD-MCNC: 8.6 G/DL
HGB BLD-MCNC: 8.9 G/DL
HYPOCHROMIA BLD QL SMEAR: ABNORMAL
IMM GRANULOCYTES # BLD AUTO: 0 K/UL
IMM GRANULOCYTES # BLD AUTO: 0.01 K/UL
IMM GRANULOCYTES NFR BLD AUTO: 0 %
IMM GRANULOCYTES NFR BLD AUTO: 0.7 %
IMM GRANULOCYTES NFR BLD AUTO: 0.7 %
IMM GRANULOCYTES NFR BLD AUTO: 0.8 %
INR PPP: 1.2
IRON SERPL-MCNC: 83 UG/DL
LACTATE SERPL-SCNC: 2.4 MMOL/L
LDH SERPL L TO P-CCNC: 239 U/L
LYMPHOCYTES # BLD AUTO: 0.4 K/UL
LYMPHOCYTES # BLD AUTO: 0.5 K/UL
LYMPHOCYTES NFR BLD: 26.1 %
LYMPHOCYTES NFR BLD: 31.9 %
LYMPHOCYTES NFR BLD: 39.8 %
LYMPHOCYTES NFR BLD: 40.9 %
MAGNESIUM SERPL-MCNC: 1.9 MG/DL
MAGNESIUM SERPL-MCNC: 2.3 MG/DL
MCH RBC QN AUTO: 30.1 PG
MCH RBC QN AUTO: 30.1 PG
MCH RBC QN AUTO: 30.7 PG
MCH RBC QN AUTO: 30.8 PG
MCHC RBC AUTO-ENTMCNC: 33.1 G/DL
MCHC RBC AUTO-ENTMCNC: 33.2 G/DL
MCHC RBC AUTO-ENTMCNC: 33.7 G/DL
MCHC RBC AUTO-ENTMCNC: 34.4 G/DL
MCV RBC AUTO: 89 FL
MCV RBC AUTO: 91 FL
MCV RBC AUTO: 91 FL
MCV RBC AUTO: 92 FL
MONOCYTES # BLD AUTO: 0.2 K/UL
MONOCYTES NFR BLD: 11.7 %
MONOCYTES NFR BLD: 12.3 %
MONOCYTES NFR BLD: 16.9 %
MONOCYTES NFR BLD: 17.2 %
NEUTROPHILS # BLD AUTO: 0.4 K/UL
NEUTROPHILS # BLD AUTO: 0.6 K/UL
NEUTROPHILS # BLD AUTO: 0.7 K/UL
NEUTROPHILS # BLD AUTO: 0.8 K/UL
NEUTROPHILS NFR BLD: 40.8 %
NEUTROPHILS NFR BLD: 45.3 %
NEUTROPHILS NFR BLD: 53.7 %
NEUTROPHILS NFR BLD: 54.9 %
NRBC BLD-RTO: 0 /100 WBC
OSMOLALITY SERPL: 296 MOSM/KG
OVALOCYTES BLD QL SMEAR: ABNORMAL
PHOSPHATE SERPL-MCNC: 2.9 MG/DL
PLATELET # BLD AUTO: 31 K/UL
PLATELET # BLD AUTO: 34 K/UL
PLATELET # BLD AUTO: 35 K/UL
PLATELET # BLD AUTO: 35 K/UL
PLATELET BLD QL SMEAR: ABNORMAL
PMV BLD AUTO: 10.7 FL
PMV BLD AUTO: 10.9 FL
PMV BLD AUTO: 11.3 FL
PMV BLD AUTO: 12.1 FL
POCT GLUCOSE: 188 MG/DL (ref 70–110)
POCT GLUCOSE: 199 MG/DL (ref 70–110)
POCT GLUCOSE: 209 MG/DL (ref 70–110)
POCT GLUCOSE: 66 MG/DL (ref 70–110)
POCT GLUCOSE: 93 MG/DL (ref 70–110)
POIKILOCYTOSIS BLD QL SMEAR: SLIGHT
POLYCHROMASIA BLD QL SMEAR: ABNORMAL
POTASSIUM SERPL-SCNC: 2.8 MMOL/L
POTASSIUM SERPL-SCNC: 4.3 MMOL/L
POTASSIUM SERPL-SCNC: 5 MMOL/L
PROCALCITONIN SERPL IA-MCNC: 0.59 NG/ML
PROT SERPL-MCNC: 4.7 G/DL
PROT SERPL-MCNC: 5.4 G/DL
PROTHROMBIN TIME: 12.3 SEC
RBC # BLD AUTO: 2.14 M/UL
RBC # BLD AUTO: 2.72 M/UL
RBC # BLD AUTO: 2.86 M/UL
RBC # BLD AUTO: 2.9 M/UL
RETICS/RBC NFR AUTO: 3.5 %
SATURATED IRON: 90 %
SODIUM SERPL-SCNC: 130 MMOL/L
SODIUM SERPL-SCNC: 132 MMOL/L
SODIUM SERPL-SCNC: 132 MMOL/L
TOTAL IRON BINDING CAPACITY: 92 UG/DL
TRANS ERYTHROCYTES VOL PATIENT: NORMAL ML
TRANSFERRIN SERPL-MCNC: 62 MG/DL
TRANSFERRIN SERPL-MCNC: 62 MG/DL
VIT B12 SERPL-MCNC: 674 PG/ML
WBC # BLD AUTO: 0.93 K/UL
WBC # BLD AUTO: 1.28 K/UL
WBC # BLD AUTO: 1.38 K/UL
WBC # BLD AUTO: 1.42 K/UL

## 2018-08-22 PROCEDURE — 83010 ASSAY OF HAPTOGLOBIN QUANT: CPT | Mod: NTX

## 2018-08-22 PROCEDURE — 85610 PROTHROMBIN TIME: CPT | Mod: NTX

## 2018-08-22 PROCEDURE — 80053 COMPREHEN METABOLIC PANEL: CPT | Mod: 91,NTX

## 2018-08-22 PROCEDURE — 99233 SBSQ HOSP IP/OBS HIGH 50: CPT | Mod: NTX,,, | Performed by: INTERNAL MEDICINE

## 2018-08-22 PROCEDURE — 85060 BLOOD SMEAR INTERPRETATION: CPT | Mod: NTX,,, | Performed by: PATHOLOGY

## 2018-08-22 PROCEDURE — 84100 ASSAY OF PHOSPHORUS: CPT | Mod: NTX

## 2018-08-22 PROCEDURE — 86901 BLOOD TYPING SEROLOGIC RH(D): CPT | Mod: NTX

## 2018-08-22 PROCEDURE — 83930 ASSAY OF BLOOD OSMOLALITY: CPT | Mod: NTX

## 2018-08-22 PROCEDURE — 85045 AUTOMATED RETICULOCYTE COUNT: CPT | Mod: NTX

## 2018-08-22 PROCEDURE — 97803 MED NUTRITION INDIV SUBSEQ: CPT | Mod: NTX

## 2018-08-22 PROCEDURE — 36430 TRANSFUSION BLD/BLD COMPNT: CPT | Mod: NTX

## 2018-08-22 PROCEDURE — 36415 COLL VENOUS BLD VENIPUNCTURE: CPT | Mod: NTX

## 2018-08-22 PROCEDURE — 25000003 PHARM REV CODE 250: Mod: NTX | Performed by: PHYSICIAN ASSISTANT

## 2018-08-22 PROCEDURE — 83540 ASSAY OF IRON: CPT | Mod: NTX

## 2018-08-22 PROCEDURE — 63600175 PHARM REV CODE 636 W HCPCS: Mod: NTX | Performed by: NURSE PRACTITIONER

## 2018-08-22 PROCEDURE — 83605 ASSAY OF LACTIC ACID: CPT | Mod: NTX

## 2018-08-22 PROCEDURE — 85025 COMPLETE CBC W/AUTO DIFF WBC: CPT | Mod: NTX

## 2018-08-22 PROCEDURE — 80053 COMPREHEN METABOLIC PANEL: CPT | Mod: NTX

## 2018-08-22 PROCEDURE — 99233 SBSQ HOSP IP/OBS HIGH 50: CPT | Mod: NTX,,, | Performed by: HOSPITALIST

## 2018-08-22 PROCEDURE — P9021 RED BLOOD CELLS UNIT: HCPCS | Mod: NTX

## 2018-08-22 PROCEDURE — 25000003 PHARM REV CODE 250: Mod: NTX | Performed by: HOSPITALIST

## 2018-08-22 PROCEDURE — 25000003 PHARM REV CODE 250: Mod: NTX | Performed by: NURSE PRACTITIONER

## 2018-08-22 PROCEDURE — 82728 ASSAY OF FERRITIN: CPT | Mod: NTX

## 2018-08-22 PROCEDURE — 20600001 HC STEP DOWN PRIVATE ROOM: Mod: NTX

## 2018-08-22 PROCEDURE — 83735 ASSAY OF MAGNESIUM: CPT | Mod: NTX

## 2018-08-22 PROCEDURE — 80048 BASIC METABOLIC PNL TOTAL CA: CPT | Mod: NTX

## 2018-08-22 PROCEDURE — 82607 VITAMIN B-12: CPT | Mod: NTX

## 2018-08-22 PROCEDURE — 87516 HEPATITIS B DNA AMP PROBE: CPT | Mod: NTX

## 2018-08-22 PROCEDURE — 83735 ASSAY OF MAGNESIUM: CPT | Mod: 91,NTX

## 2018-08-22 PROCEDURE — 86920 COMPATIBILITY TEST SPIN: CPT | Mod: NTX

## 2018-08-22 PROCEDURE — 85384 FIBRINOGEN ACTIVITY: CPT | Mod: NTX

## 2018-08-22 PROCEDURE — 83615 LACTATE (LD) (LDH) ENZYME: CPT | Mod: NTX

## 2018-08-22 PROCEDURE — 63600175 PHARM REV CODE 636 W HCPCS: Mod: JG,NTX | Performed by: PHYSICIAN ASSISTANT

## 2018-08-22 PROCEDURE — 86403 PARTICLE AGGLUT ANTBDY SCRN: CPT | Mod: NTX

## 2018-08-22 PROCEDURE — 63600175 PHARM REV CODE 636 W HCPCS: Mod: NTX | Performed by: HOSPITALIST

## 2018-08-22 PROCEDURE — 99233 SBSQ HOSP IP/OBS HIGH 50: CPT | Mod: NTX,,, | Performed by: NURSE PRACTITIONER

## 2018-08-22 PROCEDURE — 82746 ASSAY OF FOLIC ACID SERUM: CPT | Mod: NTX

## 2018-08-22 PROCEDURE — P9047 ALBUMIN (HUMAN), 25%, 50ML: HCPCS | Mod: JG,NTX | Performed by: PHYSICIAN ASSISTANT

## 2018-08-22 PROCEDURE — 84145 PROCALCITONIN (PCT): CPT | Mod: NTX

## 2018-08-22 RX ORDER — HYDROCODONE BITARTRATE AND ACETAMINOPHEN 500; 5 MG/1; MG/1
TABLET ORAL
Status: DISCONTINUED | OUTPATIENT
Start: 2018-08-22 | End: 2018-08-24 | Stop reason: HOSPADM

## 2018-08-22 RX ORDER — IPRATROPIUM BROMIDE AND ALBUTEROL SULFATE 2.5; .5 MG/3ML; MG/3ML
3 SOLUTION RESPIRATORY (INHALATION) EVERY 4 HOURS PRN
Status: DISCONTINUED | OUTPATIENT
Start: 2018-08-22 | End: 2018-08-24 | Stop reason: HOSPADM

## 2018-08-22 RX ORDER — POTASSIUM CHLORIDE 20 MEQ/15ML
40 SOLUTION ORAL
Status: COMPLETED | OUTPATIENT
Start: 2018-08-22 | End: 2018-08-22

## 2018-08-22 RX ORDER — ALBUMIN HUMAN 250 G/1000ML
25 SOLUTION INTRAVENOUS ONCE
Status: COMPLETED | OUTPATIENT
Start: 2018-08-22 | End: 2018-08-22

## 2018-08-22 RX ORDER — POTASSIUM CHLORIDE 7.46 G/1000ML
10 INJECTION, SOLUTION INTRAVENOUS
Status: DISCONTINUED | OUTPATIENT
Start: 2018-08-22 | End: 2018-08-22

## 2018-08-22 RX ORDER — MIDODRINE HYDROCHLORIDE 5 MG/1
15 TABLET ORAL EVERY 8 HOURS
Status: DISCONTINUED | OUTPATIENT
Start: 2018-08-22 | End: 2018-08-24 | Stop reason: HOSPADM

## 2018-08-22 RX ORDER — POTASSIUM CHLORIDE 7.45 MG/ML
10 INJECTION INTRAVENOUS
Status: DISPENSED | OUTPATIENT
Start: 2018-08-22 | End: 2018-08-22

## 2018-08-22 RX ADMIN — POTASSIUM CHLORIDE 40 MEQ: 20 SOLUTION ORAL at 05:08

## 2018-08-22 RX ADMIN — POTASSIUM CHLORIDE 10 MEQ: 10 INJECTION, SOLUTION INTRAVENOUS at 04:08

## 2018-08-22 RX ADMIN — INSULIN ASPART 15 UNITS: 100 INJECTION, SOLUTION INTRAVENOUS; SUBCUTANEOUS at 08:08

## 2018-08-22 RX ADMIN — LACTULOSE 30 G: 20 SOLUTION ORAL at 08:08

## 2018-08-22 RX ADMIN — MIDODRINE HYDROCHLORIDE 15 MG: 5 TABLET ORAL at 03:08

## 2018-08-22 RX ADMIN — ENTECAVIR 0.5 MG: 0.5 TABLET, FILM COATED ORAL at 08:08

## 2018-08-22 RX ADMIN — VANCOMYCIN HYDROCHLORIDE 750 MG: 750 INJECTION, POWDER, LYOPHILIZED, FOR SOLUTION INTRAVENOUS at 04:08

## 2018-08-22 RX ADMIN — INSULIN ASPART 2 UNITS: 100 INJECTION, SOLUTION INTRAVENOUS; SUBCUTANEOUS at 08:08

## 2018-08-22 RX ADMIN — ALBUMIN HUMAN 25 G: 0.25 SOLUTION INTRAVENOUS at 05:08

## 2018-08-22 RX ADMIN — MIDODRINE HYDROCHLORIDE 15 MG: 5 TABLET ORAL at 05:08

## 2018-08-22 RX ADMIN — POTASSIUM CHLORIDE 40 MEQ: 20 SOLUTION ORAL at 03:08

## 2018-08-22 RX ADMIN — ALUMINUM HYDROXIDE, MAGNESIUM HYDROXIDE, AND SIMETHICONE: 200; 200; 20 SUSPENSION ORAL at 03:08

## 2018-08-22 RX ADMIN — ERTAPENEM SODIUM 1 G: 1 INJECTION, POWDER, LYOPHILIZED, FOR SOLUTION INTRAMUSCULAR; INTRAVENOUS at 03:08

## 2018-08-22 RX ADMIN — INSULIN ASPART 15 UNITS: 100 INJECTION, SOLUTION INTRAVENOUS; SUBCUTANEOUS at 12:08

## 2018-08-22 RX ADMIN — MIDODRINE HYDROCHLORIDE 15 MG: 5 TABLET ORAL at 09:08

## 2018-08-22 NOTE — CONSULTS
"  [facility]  Adult Nutrition  Consult Note    SUMMARY     Recommendations    1. Continue current 2 gram sodium diet + Boost Glucose Control ONS (fluid restriction per MD).   2. RD to monitor & follow-up.    Goals: PO intake >50%  Nutrition Goal Status: progressing towards goal  Communication of RD Recs: reviewed with RN    Reason for Assessment    Reason for Assessment: RD follow-up  Diagnosis: other (see comments)(Hepatic encephalopathy)(Liver transplant work-up)  Relevant Medical History: Cirrhosis  Interdisciplinary Rounds: did not attend    General Information Comments: Pt w/ improving appetite, consuming 75% of meals. Boost Glucose Control ONS ordered. Pt follows low sodium diet at home, diet education reinforced.  Nutrition Discharge Planning: Adequate PO intake    Nutrition/Diet History    Patient Reported Diet/Restrictions/Preferences: low salt  Do you have any cultural, spiritual, Restorationism conflicts, given your current situation?: Hindu  Factors Affecting Nutritional Intake: decreased appetite    Anthropometrics    Temp: 98.2 °F (36.8 °C)  Height Method: Stated  Height: 5' 5" (165.1 cm)  Height (inches): 65 in  Weight Method: Bed Scale  Weight: 55.2 kg (121 lb 11.1 oz)  Weight (lb): 121.7 lb  Ideal Body Weight (IBW), Female: 125 lb  % Ideal Body Weight, Female (lb): 97.36 lb  BMI (Calculated): 20.3  BMI Grade: 18.5-24.9 - normal  Usual Body Weight (UBW), k kg  % Usual Body Weight: 81.76  % Weight Change From Usual Weight: -18.41 %    Lab/Procedures/Meds    Pertinent Labs Reviewed: reviewed  Pertinent Labs Comments: Na 130, BUN 43, GFR 51.3, Gluc 252, Bili 3  Pertinent Medications Reviewed: reviewed  Pertinent Medications Comments: Insulin    Physical Findings/Assessment    Overall Physical Appearance: loss of muscle mass, loss of subcutaneous fat, underweight  Oral/Mouth Cavity: WDL  Skin: incision(s)    Estimated/Assessed Needs    Weight Used For Calorie Calculations: 55.2 kg " (121 lb 11.1 oz)     Energy Calorie Requirements (kcal): 1401 kcal/d  Energy Need Method: [place]-St Jeor(1.3 PAL)     Protein Requirements: 72 g/d (1.3 g/kg)  Weight Used For Protein Calculations: 55.2 kg (121 lb 11.1 oz)     Fluid Need Method: other (see comments)(Per MD or 1 mL/kcal)    Nutrition Prescription Ordered    Current Diet Order: 2 gram Na  Nutrition Order Comments: 1000 mL FR  Oral Nutrition Supplement: Boost Glucose Control ONS    Evaluation of Received Nutrient/Fluid Intake    Comments: LBM: 8/22    Tolerance: tolerating    Nutrition Risk    Level of Risk/Frequency of Follow-up: (1x/week)     Assessment and Plan    Severe malnutrition       Malnutrition in the context of Chronic Illness/Injury     Related to (etiology):  Liver fx, liver transplant work-up     Signs and Symptoms (as evidenced by):  Energy Intake: <50% of estimated energy requirement for 6 months  Body Fat Depletion: severe depletion of orbitals and triceps   Muscle Mass Depletion: severe depletion of temples, clavicle region and lower extremities   Weight Loss: 18% x 6 months     Nutrition Diagnosis Status:  Improving      Monitor and Evaluation    Food and Nutrient Intake: energy intake, food and beverage intake  Food and Nutrient Adminstration: diet order  Physical Activity and Function: nutrition-related ADLs and IADLs  Anthropometric Measurements: weight, weight change  Biochemical Data, Medical Tests and Procedures: lipid profile, inflammatory profile, glucose/endocrine profile, gastrointestinal profile, electrolyte and renal panel  Nutrition-Focused Physical Findings: overall appearance     Nutrition Follow-Up    RD Follow-up?: Yes

## 2018-08-22 NOTE — PROGRESS NOTES
[facility]  Hepatology  Progress Note    Patient Name: Scarlett Reddy  MRN: [id number]  Admission Date: 8/14/2018  Hospital Length of Stay: 8 days  Attending Provider: Cristobal Rodrigues MD   Primary Care Physician: Primary Doctor No  Principal Problem:Pre-transplant evaluation for chronic liver disease    Subjective:     Transplant status: No    HPI: Scarlett Reddy is a 69 y/o female with past medical history of Hep B and D cirrhosis, on entecavir with undetectable Hep B DNA, c/b EV with bleeding, HE, PV thrombosis, and refractory ascites, T2DM, who presented as a transfer from [facility] for evaluation of liver transplant.     Per records, the patient has been declining recently with hyponatremia due to diuretics (Na of 121), that improvement to 129 with holding diuresis, but now requiring paracentesis every 7-10 days.   She presented to a hospital in [facility] for progressive weakness, found to have worsening hyponatremia.  Per son report, the patient declined rapidly at the end of the trip to [place], and had to be transported by EMS.    Patient today states that she is feeling well .She states that she does not remember the details of her trip. She states that she had a paracentesis just before flying to [place]. She denies f/c, denies N/V, deines abdominal pain. Endorses abdominal swelling. Denies melena or hematemesis    The patient notes she was active and independent prior to these event. She denies alcohol use.     Labs on presentation are notable for PLT of 85, Na 124, Cr 1.4, ALKP 658, Tbili 2.1, AST//160     Interval History:   No acute events overnight or major complaints this morning.    Current Facility-Administered Medications   Medication    0.9%  NaCl infusion (for blood administration)    dextrose 50% injection 12.5 g    dextrose 50% injection 25 g    entecavir tablet 0.5 mg    ertapenem (INVANZ) 1 g in sodium chloride 0.9 % 100 mL IVPB (ready to mix system)    glucagon  (human recombinant) injection 1 mg    glucose chewable tablet 16 g    glucose chewable tablet 24 g    insulin aspart U-100 pen 0-5 Units    insulin aspart U-100 pen 15 Units    insulin detemir U-100 pen 23 Units    lactulose 20 gram/30 mL solution Soln 30 g    midodrine tablet 15 mg    vancomycin 750 mg in dextrose 5 % 250 mL IVPB (ready to mix system)       Objective:     Vital Signs (Most Recent):  Temp: 99.6 °F (37.6 °C) (08/22/18 1548)  Pulse: 83 (08/22/18 1548)  Resp: 16 (08/22/18 1548)  BP: (!) 91/49 (08/22/18 1548)  SpO2: 97 % (08/22/18 1548) Vital Signs (24h Range):  Temp:  [98 °F (36.7 °C)-99.6 °F (37.6 °C)] 99.6 °F (37.6 °C)  Pulse:  [] 83  Resp:  [12-18] 16  SpO2:  [94 %-100 %] 97 %  BP: (77-94)/(40-52) 91/49     Weight: 55.2 kg (121 lb 11.1 oz) (08/22/18 1000)  Body mass index is 20.25 kg/m².    Physical Exam   Constitutional: She is oriented to person, place, and time. No distress.   HENT:   Head: Normocephalic and atraumatic.   Eyes: No scleral icterus.   Cardiovascular: Normal rate and regular rhythm.   Pulmonary/Chest: Effort normal and breath sounds normal.   Abdominal: Soft. Bowel sounds are normal. She exhibits distension. She exhibits no mass. There is no tenderness. There is no rebound and no guarding. No hernia.   Genitourinary:   Genitourinary Comments: Improving erythema of the labia majora and inguinal folds   Musculoskeletal: She exhibits edema.   Neurological: She is alert and oriented to person, place, and time.   Skin: She is not diaphoretic.       MELD-Na score: 17 at 8/22/2018  8:32 AM  MELD score: 13 at 8/22/2018  8:32 AM  Calculated from:  Serum Creatinine: 0.9 mg/dL (Rounded to 1 mg/dL) at 8/22/2018  8:32 AM  Serum Sodium: 132 mmol/L at 8/22/2018  8:32 AM  Total Bilirubin: 3 mg/dL at 8/22/2018  5:32 AM  INR(ratio): 1.2 at 8/22/2018  5:32 AM  Age: 68 years    Significant Labs:  CBC:   Recent Labs   Lab  08/22/18   1322   WBC  1.28*   RBC  2.90*   HGB  8.9*   HCT  25.9*    PLT  35*     CMP:   Recent Labs   Lab  08/22/18   0532  08/22/18   0832   GLU  252*  221*   CALCIUM  8.2*  8.4*   ALBUMIN  4.3   --    PROT  5.4*   --    NA  130*  132*   K  4.3  5.0   CO2  20*  19*   CL  101  105   BUN  43*  40*   CREATININE  1.1  0.9   ALKPHOS  317*   --    ALT  93*   --    AST  187*   --    BILITOT  3.0*   --      Coagulation:   Recent Labs   Lab  08/22/18   0532   INR  1.2       Significant Imaging:  X-Ray: Reviewed  US: Reviewed  MRI: Reviewed    Assessment/Plan:     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    68 year old female with past medical history of Hep B and D cirrhosis on Entecavir c/b EV, HE, PV thrombosis, and refractory ascites. Patient admitted for HE and is currently undergoing liver transplant evaluation. Overall patient has always had minor to no complaints however from a clinical perspective we have remained concerned due to her borderline BP and persistent pancytopenia.    Recommendations:  --Daily CMP, CBC, and INR  --Low salt diet  --Continue Lactulose (titrate to 3-5 BM in 24 hours)  --Continue Midodrine   --Continue Antibiotics  --Continue Entecavir   --Patient will be presented at today's selection committee             Thank you for your consult. I will follow-up with patient. Please contact us if you have any additional questions.    Michelle Leone M.D.  Gastroenterology Fellow, PGY-V  Pager: [phone]  [facility]

## 2018-08-22 NOTE — ASSESSMENT & PLAN NOTE
68 year old with Hepatitis B and D cirrhosis on Entecavir (Hep B DNA not detected on 5/2018), c/b esophageal varices, DM who was transferred from [facility] for inpatient liver transplant evaluation.  She was seen by ID last week for liver transplant evaluation - no contraindications noted from ID perspective at that time.  ID was re-consulted for evaluation of suspected sepsis given hypotension and tachycardia.  She is also now neutropenic.    Blood cultures 8/16 and 8/17  NGTD  Ascitic fluid cultures NGTD - cell count negative for SBP.  Most recent paracentesis 8/21 - cell count wnl  Urine culture 8/16 - ESBL K. Pneumo and E. Faecium (vanc sensitive), though patient asymptomatic.    Repeat urine culture 8/20 - candida albicans (10-49K).  She did have complaints of groin rash, improved from earlier in week.   CXR 8/21 - mild left sided pleural effusion with associated mild left lower lobar consolidation/atelectasis  Procalcitonin 8/16 .25  Lactate 2.4     Continues to deny fevers, sweats.   No sinus congestion, sore throat.  Complaining of dry intermittent cough, no SOB.  O2 sats % on RA.  Denies dysuria, frequency. Abdominal distension, but denies abdominal pain. Denies skin wounds/breakdown. IV sites clear.  Currently on IV vancomycin and ertapenem.     Plan/Recommendations:  1.  Continue IV vancomycin 750 mg q 24 hours.  Trough before 4th dose today  2.  Continue ertapenem 1 gram IV q 24 hours for now.   3.  Recheck procalcitonin.  4.  Add cryptococcal antigen  5.  CT chest non-contrast   6.  Will follow up tomorrow.     Data reviewed and plan discussed with ID staff  Discussed with Primary Team

## 2018-08-22 NOTE — PLAN OF CARE
Problem: Patient Care Overview  Goal: Plan of Care Review  Outcome: Ongoing (interventions implemented as appropriate)  Patient oriented. Spouse and  at bedside. Lactulose given with three bowel movements. Mylanta given for indigestion complaints. Will continue to monitor

## 2018-08-22 NOTE — ASSESSMENT & PLAN NOTE
68 year old female with past medical history of Hep B and D cirrhosis on Entecavir c/b EV, HE, PV thrombosis, and refractory ascites. Patient admitted for HE and is currently undergoing liver transplant evaluation. Overall patient has always had minor to no complaints however from a clinical perspective we have remained concerned due to her borderline BP and persistent pancytopenia.    Recommendations:  --Daily CMP, CBC, and INR  --Low salt diet  --Continue Lactulose (titrate to 3-5 BM in 24 hours)  --Continue Midodrine   --Continue Antibiotics  --Continue Entecavir   --Patient will be presented at today's selection committee

## 2018-08-22 NOTE — NURSING
"Used language line at this time, spoke diane Carr,  #[phone]; discussed current POC Including monitoring BP, finishing bolus of IV fluids, trying to get new IV access for pt; pt indicated that she "has no pain, has nothing to say to nurses, went to the bathroom by herself and wants to go to sleep"; no further needs discussed with   "

## 2018-08-22 NOTE — CARE UPDATE
RN Proactive Rounding Note  Time of Visit:     Admit Date: 2018  LOS: 8  Code Status: Full Code   Date of Visit: 2018  : 1950  Age: 68 y.o.  Sex: female  [facility] A:   MRN: [id number]  Was the patient discharged from an ICU this admission? no  Was the patient discharged from a PACU within last 24 hours? no  Did the patient receive conscious sedation/general anesthesia in last 24 hours? no  Was the patient in the ED within the past 24 hours? no  Was the patient started on NIPPV within the past 24 hours? no  Attending Physician: Cristobal Rodrigues MD  Primary Service: [facility] HOSP MED L      ASSESSMENT:     Abnormal Vital Signs: hypotnesion  Clinical Issues: Circulatory     Patient received paracentesis today removing 4.7L SBP recently 70's however after albumin 25g dose and fluid bolus SBP now 80's. Baseline SBP 80 to low 100. Pt appears to be in no distress, repositioned in bed after complaints of SOB. RR 16, no oxygen requirements, and clear breath sounds.   0924 hgb 10.0 and  0755 hgb 7.4     INTERVENTIONS/ RECOMMENDATIONS:     May need blood products  CBC, type & screen, CMP, magnesium, lactic acid    Discussed plan of care with VALENTINA Radnolph    PHYSICIAN ESCALATION:     Yes/No yes    Orders received and case discussed with GREGORIO Lai.     Disposition: 1004A    FOLLOW-UP/CONTINGENCY:       Call back the Rapid Response Nurse at x [phone]  for additional questions or concerns

## 2018-08-22 NOTE — PROGRESS NOTES
[facility]  Infectious Disease  Progress Note    Patient Name: Scarlett Reddy  MRN: [id number]  Admission Date: 8/14/2018  Length of Stay: 8 days  Attending Physician: Cristobal Rodrigues MD  Primary Care Provider: Primary Doctor No    Isolation Status: Contact  Assessment/Plan:      Pre-transplant evaluation for liver transplant       68 year old with Hepatitis B and D cirrhosis on Entecavir (Hep B DNA not detected on 5/2018), c/b esophageal varices, DM who was transferred from [facility] for inpatient liver transplant evaluation.  She was seen by ID last week for liver transplant evaluation - no contraindications noted from ID perspective at that time.  ID was re-consulted for evaluation of suspected sepsis given hypotension and tachycardia.  She is also now neutropenic.    Blood cultures 8/16 and 8/17  NGTD  Ascitic fluid cultures NGTD - cell count negative for SBP.  Most recent paracentesis 8/21 - cell count wnl  Urine culture 8/16 - ESBL K. Pneumo and E. Faecium (vanc sensitive), though patient asymptomatic.    Repeat urine culture 8/20 - candida albicans (10-49K).  She did have complaints of groin rash, improved from earlier in week.   CXR 8/21 - mild left sided pleural effusion with associated mild left lower lobar consolidation/atelectasis  Procalcitonin 8/16 .25  Lactate 2.4     Continues to deny fevers, sweats.   No sinus congestion, sore throat.  Complaining of dry intermittent cough, no SOB.  O2 sats % on RA.  Denies dysuria, frequency. Abdominal distension, but denies abdominal pain. Denies skin wounds/breakdown. IV sites clear.  Currently on IV vancomycin and ertapenem.     Plan/Recommendations:  1.  Continue IV vancomycin 750 mg q 24 hours.  Trough before 4th dose today  2.  Continue ertapenem 1 gram IV q 24 hours for now.   3.  Recheck procalcitonin.  4.  Add cryptococcal antigen  5.  CT chest non-contrast   6.  Will follow up tomorrow.     Data reviewed and plan discussed with ID  staff  Discussed with Primary Team             Thank you.   Please call for any questions or concerns.  Merary Malcolm, APRN, ANP-C  [phone]    Subjective:     Principal Problem:Pre-transplant evaluation for chronic liver disease    HPI:   Ms. Reddy is a 68 year old with Hepatitis B and D cirrhosis on Entecavir (Hep B DNA not detected on 5/2018), c/b esophageal varices, DM who was transferred from [facility] for inpatient liver transplant evaluation.  She was seen by ID last week for liver transplant evaluation - no contraindications noted from ID perspective.  ID is re-consulted for evaluation of suspected sepsis given hypotension and tachycardia.      Blood cultures NGTD  Ascitic fluid cultures NGTD - cell count negative for SBP  Urine culture - ESBL K. Pneumo and E. Faecium (vanc sensitive)   CXR on admit was clear.    Leukopenic - ANC 1000  Thrombocytopenic  Lactic acid 2.5     Son at bedside today provided translation.  She denies fevers, chills, sweats.  No sinus congestion, sore throat.  Complaining of dry intermittent cough, no SOB.  O2 sats % on RA.  Denies dysuria, frequency.  No suprapubic or flank pain.  Complains of abdominal distension, but no abdominal pain.  No nausea/vomiting.  Denies skin wounds/breakdown.   Primary complaint is fatigue.     Interval History: No acute events overnight.  Afebrile.   Neutropenic.    Blood cultures NGTD.    Paracentesis yesterday - cell count negative for SBP.  Cultures NGTD   New urine culture - yeast (10K -49K).   Complaining of nausea, vomiting, diarrhea, and reflux today.  Dry cough.  Denies fevers, SOB      Review of Systems   Constitutional: Positive for activity change and fatigue. Negative for appetite change, chills, diaphoresis and fever.   HENT: Negative for congestion, dental problem, sinus pressure, sinus pain, sneezing, sore throat and trouble swallowing.    Eyes: Negative.    Respiratory: Positive for cough (dry, intermittent). Negative for shortness  of breath and wheezing.    Cardiovascular: Negative for chest pain, palpitations and leg swelling.   Gastrointestinal: Positive for abdominal distention, diarrhea (intermittent with lactulose), nausea and vomiting. Negative for abdominal pain.   Genitourinary: Negative for difficulty urinating, dysuria, flank pain, frequency, hematuria and urgency.   Musculoskeletal: Negative for arthralgias, back pain and neck pain.   Skin: Negative for color change.   Neurological: Negative for dizziness and weakness.   Hematological: Negative for adenopathy. Bruises/bleeds easily.   Psychiatric/Behavioral: Negative for decreased concentration. The patient is not nervous/anxious.      Objective:     Vital Signs (Most Recent):  Temp: 98.9 °F (37.2 °C) (08/21/18 0800)  Pulse: 95 (08/21/18 0800)  Resp: 16 (08/21/18 0800)  BP: (!) 91/50 (08/21/18 0800)  SpO2: 98 % (08/21/18 0800) Vital Signs (24h Range):  Temp:  [97.6 °F (36.4 °C)-98.9 °F (37.2 °C)] 98.9 °F (37.2 °C)  Pulse:  [] 95  Resp:  [16-20] 16  SpO2:  [96 %-99 %] 98 %  BP: ()/(42-54) 91/50     Weight: 55.2 kg (121 lb 9.6 oz)  Body mass index is 20.24 kg/m².    Estimated Creatinine Clearance: 42.1 mL/min (based on SCr of 1.1 mg/dL).    Physical Exam   Constitutional: No distress.   Chronically ill appearing   at bedside     HENT:   Head: Normocephalic.   Mouth/Throat: She does not have dentures. No oral lesions. Normal dentition. No oropharyngeal exudate.   Eyes: Conjunctivae are normal. No scleral icterus.   Neck: Normal range of motion. Neck supple.   Cardiovascular: Normal rate, regular rhythm and normal heart sounds. Exam reveals no gallop and no friction rub.   No murmur heard.  Pulmonary/Chest: Effort normal. No respiratory distress.   Abdominal: She exhibits distension. There is no tenderness. There is no guarding.   Firm     Musculoskeletal: She exhibits no edema.   Lymphadenopathy:     She has no cervical adenopathy.     She has no axillary  adenopathy.        Right: No supraclavicular adenopathy present.        Left: No supraclavicular adenopathy present.   Neurological: She is alert.   LAO   Skin: Skin is warm and dry. She is not diaphoretic.   Evidence of healed rash left lower leg  IV and old IV site clear.  No evidence of thrombophlebitis     Psychiatric:   Appears very fatigued  Responds to questions appropriately   Vitals reviewed.      Significant Labs:   Blood Culture:   Recent Labs   Lab  08/14/18   1149  08/16/18   0214  08/16/18   0221  08/17/18   2236   LABBLOO  No growth after 5 days.  No growth after 5 days.  No growth after 5 days.  No growth after 5 days.  No Growth to date  No Growth to date  No Growth to date  No Growth to date  No Growth to date  No Growth to date  No Growth to date  No Growth to date     CBC:   Recent Labs   Lab  08/20/18   0748  08/20/18   0924  08/21/18   0755   WBC  1.88*  1.74*  1.27*   HGB  10.0*  10.0*  7.4*   HCT  29.6*  29.4*  21.3*   PLT  48*  43*   --      CMP:   Recent Labs   Lab  08/20/18   0748  08/21/18   0443   NA  127*  126*   K  3.9  4.0   CL  99  99   CO2  18*  16*   GLU  189*  145*   BUN  45*  53*   CREATININE  1.2  1.1   CALCIUM  8.7  8.4*   PROT  5.7*  5.2*   ALBUMIN  3.7  3.9   BILITOT  2.0*  1.6*   ALKPHOS  480*  346*   AST  209*  203*   ALT  137*  110*   ANIONGAP  10  11   EGFRNONAA  46.2*  51.3*     Lactic Acid:   Recent Labs   Lab  08/20/18   0924  08/21/18   0926   LACTATE  2.5*  1.5     Urine Culture:   Recent Labs   Lab  08/16/18   0548   LABURIN  KLEBSIELLA PNEUMONIAE ESBL  >100,000 cfu/ml    ENTEROCOCCUS FAECIUM  > 100,000 cfu/ml       Urine Studies:   Recent Labs   Lab  08/17/18   2315   COLORU  Stacy   APPEARANCEUA  Clear   PHUR  6.0   SPECGRAV  1.020   PROTEINUA  Negative   GLUCUA  1+*   KETONESU  Negative   BILIRUBINUA  Negative   OCCULTUA  Negative   NITRITE  Negative   UROBILINOGEN  2.0   LEUKOCYTESUR  2+*   RBCUA  1   WBCUA  30*   BACTERIA  Few*   SQUAMEPITHEL  5        Significant Imaging: I have reviewed all pertinent imaging results/findings within the past 24 hours.

## 2018-08-22 NOTE — PROGRESS NOTES
Hematology Brief Follow-Up Note    IMPRESSION  1) Neutropenia. Today ANC is 600. This can be medication related, ertapenem is associated with neutropenia.  2) Pancytopenia, likely multifactorial from hypersplenism due to hepatitis/end-stage liver disease in setting of sepsis from UTI. Ddx includes DIC, hemolysis, nutritional deficiency, medication related (low probability for GOLD, patient has been on lovenox). HIV was negative. Today WBC 1.28, Hb 8.9, plt 35.   3) ESBL UTI  4) Decompensated liver cirrhosis in setting of hepatitis B and D infection  5) History of portal vein thrombosis     RECOMMENDATIONS  -Daily CBC with differential  -Have ordered LDH, haptoglobin, reticulocyte count, peripheral blood smear   -Have ordered iron studies (ferritin, TIBC, transferrin saturation) and B12/folate  -Have ordered repeat fibrinogen  -Transfuse for Hb<7, plt<10 or bleeding  -If there are no contraindications, can start Neupogen 5 mcg/kg once daily    The following was staffed and discussed with supervising physician Dr. Rodriguez. Will follow peripherally. Please call [employer] number [phone] between 8AM-5PM with questions, otherwise page the Consult fellow on call.     Kelli Garcia MD  Hematology/Oncology Fellow

## 2018-08-22 NOTE — NURSING
"Placed call to language line to discuss pt's current condition due to pt indicating "not feeling well"; spoke w  #[phone] Bruno; asked the pt to describe what was going on and to describe symptoms, discussed POC to include possible blood transfusion based on labs results, still waiting on most recent bloodwork to result;  "

## 2018-08-22 NOTE — PLAN OF CARE
Problem: Patient Care Overview  Goal: Plan of Care Review  Outcome: Ongoing (interventions implemented as appropriate)  Pt is A&Ox4, requires  for proper understanding of POC/treatment/medications/interventions throughout the shift; spoke with [facility]  on the phone this evening at beginning of shift, spouse had phoned  directly, language line used for all other  needs during this shift/ 4 calls placed; pt received 1unit PRBC this shift due to Hgb 6.6 result, no adverse reactions or side effects noted; vitals have remained stable for pt, continues to be hypotensive throughout the shift, new orders received regarding midodrine and additional dose of albumin this am; call light in reach, bed in low position, will continue to monitor

## 2018-08-22 NOTE — COMMITTEE REVIEW
Scarlett Reddy's case presented to selection committee.  Patient has been accepted for liver transplant due to  and complications of end stage liver disease including hyperbilirubinemia, ascites, severe malnutrition, encephalopathy, jaundice, hyponatremia, portal hypertension and esophageal varices , anasarca and weakness with a MELD score of 20.  Patient has no absolute contraindications for liver transplant.  Patient will be listed pending financial approval.    Patient will accept HBcAb positive livers.  Patient will not accept HCVAB positive livers.  Patient will accept DCD livers.  Patient will not accept HCV SETH positive livers  Patient will accept HBV SETH positive livers  I was present and agree with the committee's conclusions.

## 2018-08-22 NOTE — NURSING
Called language line, spoke w Judy  #[phone] regarding pt's low potassium level and the need to give medication oral and thru the IV to help raise the level;  explained this interventions to pt's spouse and all questions answered at this time, no further needs

## 2018-08-22 NOTE — PROGRESS NOTES
Hospital Medicine  Progress note    Team: [facility] HOSP MED L Cristobal Rodrigues MD  Admit Date: 8/14/2018  AJIT 8/24/2018  Code status: Full Code    Principal Problem:  Pre-transplant evaluation for chronic liver disease    Interval hx: Patient seen and examined at bedside, overnight was hypotensive and anemic, required one unit of PRBCs which responded appropriately. Sodium is improving after NS infusion.     ROS   Review of Systems   Constitutional: Negative for chills and fever.   HENT: Negative for ear discharge.    Eyes: Negative for blurred vision and double vision.   Cardiovascular: Negative for chest pain.   Gastrointestinal: Negative for nausea and vomiting.        Abdominal distention    Genitourinary: Negative for dysuria and urgency.   Musculoskeletal: Negative.    Skin: Negative for rash.   Neurological: Negative for tremors, sensory change and speech change.   Psychiatric/Behavioral: Negative.          PEx  Temp:  [97.9 °F (36.6 °C)-99.1 °F (37.3 °C)]   Pulse:  []   Resp:  [12-19]   BP: (77-94)/(40-53)   SpO2:  [94 %-100 %]     Intake/Output Summary (Last 24 hours) at 8/22/2018 1309  Last data filed at 8/22/2018 0523  Gross per 24 hour   Intake 1345 ml   Output 5300 ml   Net -3955 ml     Physical Exam   Constitutional: She is oriented to person, place, and time and well-developed, well-nourished, and in no distress.   HENT:   Head: Normocephalic and atraumatic.   Eyes: Pupils are equal, round, and reactive to light.   Neck: Normal range of motion. Neck supple. No thyromegaly present.   Cardiovascular: Normal rate, regular rhythm and normal heart sounds.   Pulmonary/Chest: Effort normal and breath sounds normal.   Abdominal: Soft. Bowel sounds are normal. She exhibits distension. There is no tenderness. There is no guarding.   Musculoskeletal: Normal range of motion.   Neurological: She is alert and oriented to person, place, and time.   Skin: Skin is warm and dry. No rash noted. No erythema.     Recent  Labs   Lab  08/21/18   0755  08/22/18   0019  08/22/18   0532   WBC  1.27*  0.93*  1.38*   HGB  7.4*  6.6*  8.2*   HCT  21.3*  19.6*  24.8*   PLT  35*  31*  34*     Recent Labs   Lab  08/20/18   0748  08/21/18   0443  08/22/18   0019  08/22/18   0532  08/22/18   0832   NA  127*  126*  132*  130*  132*   K  3.9  4.0  2.8*  4.3  5.0   CL  99  99  105  101  105   CO2  18*  16*  18*  20*  19*   BUN  45*  53*  40*  43*  40*   CREATININE  1.2  1.1  1.0  1.1  0.9   GLU  189*  145*  234*  252*  221*   CALCIUM  8.7  8.4*  7.2*  8.2*  8.4*   MG  2.0  2.4  1.9  2.3   --    PHOS  3.0  2.7   --   2.9   --      Recent Labs   Lab  08/20/18   0748  08/21/18   0442  08/21/18   0443  08/22/18   0019  08/22/18   0532   ALKPHOS  480*   --   346*  254*  317*   ALT  137*   --   110*  71*  93*   AST  209*   --   203*  134*  187*   ALBUMIN  3.7   --   3.9  3.9  4.3   PROT  5.7*   --   5.2*  4.7*  5.4*   BILITOT  2.0*   --   1.6*  1.3*  3.0*   INR  1.1  1.2   --    --   1.2      Recent Labs   Lab  08/20/18 2059 08/21/18   0903  08/21/18   1516  08/21/18 2128  08/22/18   0756  08/22/18   1159   POCTGLUCOSE  202*  134*  138*  231*  209*  199*     No results for input(s): CPK, CPKMB, MB, TROPONINI in the last 72 hours.    Scheduled Meds:   entecavir  0.5 mg Oral Daily    ertapenem (INVANZ) IVPB  1 g Intravenous Q24H    GI cocktail (mylanta 30 mL, lidocaine 2 % viscous 10 mL, dicyclomine 10 mL) 50 mL   Oral Once    insulin aspart U-100  15 Units Subcutaneous TIDWM    insulin detemir U-100  23 Units Subcutaneous QHS    lactulose  30 g Oral BID    midodrine  15 mg Oral Q8H    vancomycin (VANCOCIN) IVPB  750 mg Intravenous Q24H     Continuous Infusions:  As Needed:  sodium chloride, dextrose 50%, dextrose 50%, glucagon (human recombinant), glucose, glucose, insulin aspart U-100    Active Hospital Problems    Diagnosis  POA    *Pre-transplant evaluation for chronic liver disease [Z01.818]  Not Applicable    Hepatic encephalopathy  [K72.90]  Yes     Priority: 1 - High    Pre-transplant evaluation for liver transplant [Z01.818]  Not Applicable    Severe malnutrition [E43]  Yes    Anemia requiring transfusions [D64.9]  No    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 [B18.0, K74.60]  Yes    Type 2 diabetes mellitus, with long-term current use of insulin [E11.9, Z79.4]  Not Applicable    Esophageal varices without bleeding [I85.00]  Yes    Liver transplant candidate [Z76.82]  Not Applicable      Resolved Hospital Problems   No resolved problems to display.       Overview    67 yo F with with above mentioned medical history presents with confusion and decompensated liver cirrhosis, admitted for expedited liver transplant evaluation    Assessment and Plan for Problems addressed today:    Hepatic Encephalopathy  - ammonia levels high at 67 on admission  - Started on PO Lactulose, one time rectal lactulose given in the ER  - AAO X 3      Decompensated Liver cirrhosis due to Hep B And D  MELD-Na score: 17 at 8/22/2018  8:32 AM  MELD score: 13 at 8/22/2018  8:32 AM  Calculated from:  Serum Creatinine: 0.9 mg/dL (Rounded to 1 mg/dL) at 8/22/2018  8:32 AM  Serum Sodium: 132 mmol/L at 8/22/2018  8:32 AM  Total Bilirubin: 3 mg/dL at 8/22/2018  5:32 AM  INR(ratio): 1.2 at 8/22/2018  5:32 AM  Age: 68 years  - Hold beta blockers due to hypotension  - IR paracentesis removed 4.7 liters  - UA/Urine culture  - blood cultures NGTD  - Fluid restriction  - US liver doppler  - Hepatology consult, appreciate recs  - Nutrition consult for Liver transplant recs   - AFP 2.9  - Lactic acid 2.5 on admission, now 1.5  - PETH pending  - EGD on 08/17 negative for active bleeding, Portal HTN gastropathy and Grade I varices    ESBL/E faecium UTI   - contact precautions  - Started on IV ertapenem and IV Vancomycin by Infectious diseases   - repeat Urine culture pending   - ID consulted, for antibiotic guidance    Left lower lobe infiltrate  - will order CT chest  without contrast  - cryptogen ab ordered     Severe protein malnutrition  - dietary consult  - boost plus TID  - prealbumin in am     Anemia requiring blood transfusions  - transfused one unit of PRBCs 8/16 and 08/22, responded well   - Hgb 6.6 last night, transfused one unit of PRBCs  - traumatic paracentesis possibility given bloody ascites   - EGD revealed no active bleeding    Pancytopenia  - several possible etiologies such as liver failure, medications, infection  - Hematology consulted, appreciate recs   - hold lovenox due to thrombocytopenia but may resume if plts improve      Metabolic acidosis  - VBG: metabolic acidosis with respiratory alkalosis, normal pH     Diabetes type 2  - POCT q ACHS  - low dose sliding scale   - HgbA1c 6.8  - cont insulin at night, 20 units, 10 units TID      Hyponatremia  - most likely hypervolemic  - Will give IV albumin trial  - fluid restriction 1000cc  - Urine sodium  - 124-- 131--128-->130-->131-->128-->127-->126-->132-->130     Portal Vein thrombosis  - US liver doppler: no evidence of thrombus   - hold Lovenox due to thrombocytopenia     Diet:  Low sodium  DVT PPx:    On hold due to anemia    Goals of Care: Full code    Discharge plan and follow up: pending Liver transplant evaluation     Provider    Cristobal Melendez MD  Staff Hospitalist  Department of Hospital Medicine  [facility]   [phone]

## 2018-08-22 NOTE — SUBJECTIVE & OBJECTIVE
Interval History:   No acute events overnight or major complaints this morning.    Current Facility-Administered Medications   Medication    0.9%  NaCl infusion (for blood administration)    dextrose 50% injection 12.5 g    dextrose 50% injection 25 g    entecavir tablet 0.5 mg    ertapenem (INVANZ) 1 g in sodium chloride 0.9 % 100 mL IVPB (ready to mix system)    glucagon (human recombinant) injection 1 mg    glucose chewable tablet 16 g    glucose chewable tablet 24 g    insulin aspart U-100 pen 0-5 Units    insulin aspart U-100 pen 15 Units    insulin detemir U-100 pen 23 Units    lactulose 20 gram/30 mL solution Soln 30 g    midodrine tablet 15 mg    vancomycin 750 mg in dextrose 5 % 250 mL IVPB (ready to mix system)       Objective:     Vital Signs (Most Recent):  Temp: 99.6 °F (37.6 °C) (08/22/18 1548)  Pulse: 83 (08/22/18 1548)  Resp: 16 (08/22/18 1548)  BP: (!) 91/49 (08/22/18 1548)  SpO2: 97 % (08/22/18 1548) Vital Signs (24h Range):  Temp:  [98 °F (36.7 °C)-99.6 °F (37.6 °C)] 99.6 °F (37.6 °C)  Pulse:  [] 83  Resp:  [12-18] 16  SpO2:  [94 %-100 %] 97 %  BP: (77-94)/(40-52) 91/49     Weight: 55.2 kg (121 lb 11.1 oz) (08/22/18 1000)  Body mass index is 20.25 kg/m².    Physical Exam   Constitutional: She is oriented to person, place, and time. No distress.   HENT:   Head: Normocephalic and atraumatic.   Eyes: No scleral icterus.   Cardiovascular: Normal rate and regular rhythm.   Pulmonary/Chest: Effort normal and breath sounds normal.   Abdominal: Soft. Bowel sounds are normal. She exhibits distension. She exhibits no mass. There is no tenderness. There is no rebound and no guarding. No hernia.   Genitourinary:   Genitourinary Comments: Improving erythema of the labia majora and inguinal folds   Musculoskeletal: She exhibits edema.   Neurological: She is alert and oriented to person, place, and time.   Skin: She is not diaphoretic.       MELD-Na score: 17 at 8/22/2018  8:32 AM  MELD  score: 13 at 8/22/2018  8:32 AM  Calculated from:  Serum Creatinine: 0.9 mg/dL (Rounded to 1 mg/dL) at 8/22/2018  8:32 AM  Serum Sodium: 132 mmol/L at 8/22/2018  8:32 AM  Total Bilirubin: 3 mg/dL at 8/22/2018  5:32 AM  INR(ratio): 1.2 at 8/22/2018  5:32 AM  Age: 68 years    Significant Labs:  CBC:   Recent Labs   Lab  08/22/18   1322   WBC  1.28*   RBC  2.90*   HGB  8.9*   HCT  25.9*   PLT  35*     CMP:   Recent Labs   Lab  08/22/18   0532  08/22/18   0832   GLU  252*  221*   CALCIUM  8.2*  8.4*   ALBUMIN  4.3   --    PROT  5.4*   --    NA  130*  132*   K  4.3  5.0   CO2  20*  19*   CL  101  105   BUN  43*  40*   CREATININE  1.1  0.9   ALKPHOS  317*   --    ALT  93*   --    AST  187*   --    BILITOT  3.0*   --      Coagulation:   Recent Labs   Lab  08/22/18   0532   INR  1.2       Significant Imaging:  X-Ray: Reviewed  US: Reviewed  MRI: Reviewed

## 2018-08-23 ENCOUNTER — TELEPHONE (OUTPATIENT)
Dept: TRANSPLANT | Facility: CLINIC | Age: 68
End: 2018-08-23

## 2018-08-23 LAB
ALBUMIN SERPL BCP-MCNC: 4 G/DL
ALP SERPL-CCNC: 318 U/L
ALT SERPL W/O P-5'-P-CCNC: 95 U/L
ANION GAP SERPL CALC-SCNC: 9 MMOL/L
ANISOCYTOSIS BLD QL SMEAR: SLIGHT
AST SERPL-CCNC: 187 U/L
BACTERIA BLD CULT: NORMAL
BACTERIA BLD CULT: NORMAL
BASOPHILS # BLD AUTO: 0 K/UL
BASOPHILS NFR BLD: 0 %
BILIRUB SERPL-MCNC: 2.4 MG/DL
BUN SERPL-MCNC: 41 MG/DL
CALCIUM SERPL-MCNC: 8.1 MG/DL
CHLORIDE SERPL-SCNC: 105 MMOL/L
CO2 SERPL-SCNC: 16 MMOL/L
CREAT SERPL-MCNC: 0.9 MG/DL
CRYPTOC AG SER QL LA: NEGATIVE
DIFFERENTIAL METHOD: ABNORMAL
EOSINOPHIL # BLD AUTO: 0 K/UL
EOSINOPHIL NFR BLD: 1.9 %
ERYTHROCYTE [DISTWIDTH] IN BLOOD BY AUTOMATED COUNT: 19.7 %
EST. GFR  (AFRICAN AMERICAN): >60 ML/MIN/1.73 M^2
EST. GFR  (NON AFRICAN AMERICAN): >60 ML/MIN/1.73 M^2
GLUCOSE SERPL-MCNC: 125 MG/DL
HCT VFR BLD AUTO: 25.9 %
HGB BLD-MCNC: 8.5 G/DL
HYPOCHROMIA BLD QL SMEAR: ABNORMAL
IMM GRANULOCYTES # BLD AUTO: 0 K/UL
IMM GRANULOCYTES NFR BLD AUTO: 0 %
INR PPP: 1.2
LYMPHOCYTES # BLD AUTO: 0.6 K/UL
LYMPHOCYTES NFR BLD: 38.1 %
MAGNESIUM SERPL-MCNC: 2.2 MG/DL
MCH RBC QN AUTO: 30.1 PG
MCHC RBC AUTO-ENTMCNC: 32.8 G/DL
MCV RBC AUTO: 92 FL
MONOCYTES # BLD AUTO: 0.2 K/UL
MONOCYTES NFR BLD: 13.8 %
NEUTROPHILS # BLD AUTO: 0.7 K/UL
NEUTROPHILS NFR BLD: 46.2 %
NRBC BLD-RTO: 0 /100 WBC
OVALOCYTES BLD QL SMEAR: ABNORMAL
PATH REV BLD -IMP: NORMAL
PATH REV BLD -IMP: NORMAL
PHOSPHATE SERPL-MCNC: 2.3 MG/DL
PLATELET # BLD AUTO: 31 K/UL
PMV BLD AUTO: 11.2 FL
POCT GLUCOSE: 135 MG/DL (ref 70–110)
POCT GLUCOSE: 199 MG/DL (ref 70–110)
POCT GLUCOSE: 237 MG/DL (ref 70–110)
POCT GLUCOSE: 255 MG/DL (ref 70–110)
POIKILOCYTOSIS BLD QL SMEAR: SLIGHT
POLYCHROMASIA BLD QL SMEAR: ABNORMAL
POTASSIUM SERPL-SCNC: 4.2 MMOL/L
PROT SERPL-MCNC: 5 G/DL
PROTHROMBIN TIME: 12.3 SEC
RBC # BLD AUTO: 2.82 M/UL
SODIUM SERPL-SCNC: 130 MMOL/L
VANCOMYCIN TROUGH SERPL-MCNC: 10.6 UG/ML
WBC # BLD AUTO: 1.6 K/UL

## 2018-08-23 PROCEDURE — 25000003 PHARM REV CODE 250: Mod: NTX | Performed by: PHYSICIAN ASSISTANT

## 2018-08-23 PROCEDURE — 80202 ASSAY OF VANCOMYCIN: CPT | Mod: NTX

## 2018-08-23 PROCEDURE — 99233 SBSQ HOSP IP/OBS HIGH 50: CPT | Mod: NTX,,, | Performed by: INTERNAL MEDICINE

## 2018-08-23 PROCEDURE — 97530 THERAPEUTIC ACTIVITIES: CPT | Mod: NTX

## 2018-08-23 PROCEDURE — 36415 COLL VENOUS BLD VENIPUNCTURE: CPT | Mod: NTX

## 2018-08-23 PROCEDURE — 25000003 PHARM REV CODE 250: Mod: NTX | Performed by: HOSPITALIST

## 2018-08-23 PROCEDURE — 84100 ASSAY OF PHOSPHORUS: CPT | Mod: NTX

## 2018-08-23 PROCEDURE — 20600001 HC STEP DOWN PRIVATE ROOM: Mod: NTX

## 2018-08-23 PROCEDURE — G8979 MOBILITY GOAL STATUS: HCPCS | Mod: CI,NTX

## 2018-08-23 PROCEDURE — 63600175 PHARM REV CODE 636 W HCPCS: Mod: NTX | Performed by: PHYSICIAN ASSISTANT

## 2018-08-23 PROCEDURE — 94761 N-INVAS EAR/PLS OXIMETRY MLT: CPT | Mod: NTX

## 2018-08-23 PROCEDURE — 97165 OT EVAL LOW COMPLEX 30 MIN: CPT | Mod: NTX

## 2018-08-23 PROCEDURE — 25000003 PHARM REV CODE 250: Mod: NTX | Performed by: NURSE PRACTITIONER

## 2018-08-23 PROCEDURE — 63600175 PHARM REV CODE 636 W HCPCS: Mod: NTX | Performed by: HOSPITALIST

## 2018-08-23 PROCEDURE — 85610 PROTHROMBIN TIME: CPT | Mod: NTX

## 2018-08-23 PROCEDURE — 85025 COMPLETE CBC W/AUTO DIFF WBC: CPT | Mod: NTX

## 2018-08-23 PROCEDURE — 80053 COMPREHEN METABOLIC PANEL: CPT | Mod: NTX

## 2018-08-23 PROCEDURE — 94640 AIRWAY INHALATION TREATMENT: CPT | Mod: NTX

## 2018-08-23 PROCEDURE — G8978 MOBILITY CURRENT STATUS: HCPCS | Mod: CJ,NTX

## 2018-08-23 PROCEDURE — 99233 SBSQ HOSP IP/OBS HIGH 50: CPT | Mod: NTX,,, | Performed by: NURSE PRACTITIONER

## 2018-08-23 PROCEDURE — 83735 ASSAY OF MAGNESIUM: CPT | Mod: NTX

## 2018-08-23 PROCEDURE — 25000242 PHARM REV CODE 250 ALT 637 W/ HCPCS: Mod: NTX | Performed by: PHYSICIAN ASSISTANT

## 2018-08-23 PROCEDURE — 97161 PT EVAL LOW COMPLEX 20 MIN: CPT | Mod: NTX

## 2018-08-23 RX ORDER — INSULIN ASPART 100 [IU]/ML
12 INJECTION, SOLUTION INTRAVENOUS; SUBCUTANEOUS
Status: DISCONTINUED | OUTPATIENT
Start: 2018-08-23 | End: 2018-08-24 | Stop reason: HOSPADM

## 2018-08-23 RX ORDER — ALUMINUM HYDROXIDE, MAGNESIUM HYDROXIDE, AND SIMETHICONE 2400; 240; 2400 MG/30ML; MG/30ML; MG/30ML
30 SUSPENSION ORAL EVERY 6 HOURS PRN
Status: DISCONTINUED | OUTPATIENT
Start: 2018-08-23 | End: 2018-08-24 | Stop reason: HOSPADM

## 2018-08-23 RX ADMIN — VANCOMYCIN HYDROCHLORIDE 750 MG: 750 INJECTION, POWDER, LYOPHILIZED, FOR SOLUTION INTRAVENOUS at 04:08

## 2018-08-23 RX ADMIN — ALUMINUM HYDROXIDE, MAGNESIUM HYDROXIDE, AND DIMETHICONE 30 ML: 400; 400; 40 SUSPENSION ORAL at 01:08

## 2018-08-23 RX ADMIN — MIDODRINE HYDROCHLORIDE 15 MG: 5 TABLET ORAL at 09:08

## 2018-08-23 RX ADMIN — IPRATROPIUM BROMIDE AND ALBUTEROL SULFATE 3 ML: .5; 3 SOLUTION RESPIRATORY (INHALATION) at 08:08

## 2018-08-23 RX ADMIN — ENTECAVIR 0.5 MG: 0.5 TABLET, FILM COATED ORAL at 08:08

## 2018-08-23 RX ADMIN — INSULIN ASPART 12 UNITS: 100 INJECTION, SOLUTION INTRAVENOUS; SUBCUTANEOUS at 08:08

## 2018-08-23 RX ADMIN — INSULIN ASPART 12 UNITS: 100 INJECTION, SOLUTION INTRAVENOUS; SUBCUTANEOUS at 04:08

## 2018-08-23 RX ADMIN — PIPERACILLIN AND TAZOBACTAM 4.5 G: 4; .5 INJECTION, POWDER, LYOPHILIZED, FOR SOLUTION INTRAVENOUS; PARENTERAL at 12:08

## 2018-08-23 RX ADMIN — INSULIN ASPART 12 UNITS: 100 INJECTION, SOLUTION INTRAVENOUS; SUBCUTANEOUS at 12:08

## 2018-08-23 RX ADMIN — MIDODRINE HYDROCHLORIDE 15 MG: 5 TABLET ORAL at 05:08

## 2018-08-23 RX ADMIN — MIDODRINE HYDROCHLORIDE 15 MG: 5 TABLET ORAL at 03:08

## 2018-08-23 RX ADMIN — PIPERACILLIN AND TAZOBACTAM 4.5 G: 4; .5 INJECTION, POWDER, LYOPHILIZED, FOR SOLUTION INTRAVENOUS; PARENTERAL at 07:08

## 2018-08-23 NOTE — SUBJECTIVE & OBJECTIVE
Interval History: pt feels better overall.    Review of Systems   Constitutional: Positive for activity change and fatigue. Negative for appetite change, chills, diaphoresis and fever.   HENT: Negative for congestion, dental problem, sinus pressure, sinus pain, sneezing, sore throat and trouble swallowing.    Eyes: Negative.    Respiratory: Positive for cough (dry, intermittent). Negative for shortness of breath and wheezing.    Cardiovascular: Negative for chest pain, palpitations and leg swelling.   Gastrointestinal: Positive for abdominal distention and diarrhea (intermittent with lactulose). Negative for abdominal pain, nausea and vomiting.   Genitourinary: Negative for difficulty urinating, dysuria, flank pain, frequency, hematuria and urgency.   Musculoskeletal: Negative for arthralgias, back pain and neck pain.   Skin: Negative for color change and wound.   Neurological: Positive for weakness. Negative for dizziness.   Hematological: Negative for adenopathy. Bruises/bleeds easily.   Psychiatric/Behavioral: Negative for decreased concentration. The patient is not nervous/anxious.      Objective:     Vital Signs (Most Recent):  Temp: 97.9 °F (36.6 °C) (08/23/18 0721)  Pulse: 87 (08/23/18 1131)  Resp: 18 (08/23/18 0721)  BP: (!) 92/51 (08/23/18 0721)  SpO2: 96 % (08/23/18 0721) Vital Signs (24h Range):  Temp:  [97.9 °F (36.6 °C)-99.6 °F (37.6 °C)] 97.9 °F (36.6 °C)  Pulse:  [63-92] 87  Resp:  [16-18] 18  SpO2:  [95 %-97 %] 96 %  BP: (76-92)/(42-51) 92/51     Weight: 55.2 kg (121 lb 11.1 oz)  Body mass index is 20.25 kg/m².    Estimated Creatinine Clearance: 51.4 mL/min (based on SCr of 0.9 mg/dL).    Physical Exam   Constitutional: She is oriented to person, place, and time. She appears well-developed. No distress.   Chronically ill appearing  (+) hand or temporal muscle wasting noted     HENT:   Head: Normocephalic.   Mouth/Throat: She does not have dentures. No oral lesions. Normal dentition. No oropharyngeal  exudate.   Eyes: Conjunctivae are normal. No scleral icterus.   Neck: Normal range of motion. Neck supple.   Cardiovascular: Normal rate, regular rhythm and normal heart sounds. Exam reveals no gallop and no friction rub.   No murmur heard.  Pulmonary/Chest: Effort normal. No respiratory distress.   Abdominal: She exhibits distension. There is no tenderness. There is no guarding.   Firm     Musculoskeletal: She exhibits no edema.   Neurological: She is alert and oriented to person, place, and time.   Skin: Skin is warm and dry. She is not diaphoretic.   Psychiatric: She has a normal mood and affect. Her behavior is normal.   Vitals reviewed.      Significant Labs:   Blood Culture:   Recent Labs   Lab  08/14/18   1149  08/16/18   0214  08/16/18   0221  08/17/18   2236   LABBLOO  No growth after 5 days.  No growth after 5 days.  No growth after 5 days.  No growth after 5 days.  No growth after 5 days.  No growth after 5 days.     CBC:   Recent Labs   Lab  08/22/18   1322  08/22/18   1619  08/23/18   0351   WBC  1.28*  1.42*  1.60*   HGB  8.9*  8.6*  8.5*   HCT  25.9*  25.9*  25.9*   PLT  35*  35*  31*     CMP:   Recent Labs   Lab  08/22/18   0019  08/22/18   0532  08/22/18   0832  08/23/18   0351   NA  132*  130*  132*  130*   K  2.8*  4.3  5.0  4.2   CL  105  101  105  105   CO2  18*  20*  19*  16*   GLU  234*  252*  221*  125*   BUN  40*  43*  40*  41*   CREATININE  1.0  1.1  0.9  0.9   CALCIUM  7.2*  8.2*  8.4*  8.1*   PROT  4.7*  5.4*   --   5.0*   ALBUMIN  3.9  4.3   --   4.0   BILITOT  1.3*  3.0*   --   2.4*   ALKPHOS  254*  317*   --   318*   AST  134*  187*   --   187*   ALT  71*  93*   --   95*   ANIONGAP  9  9  8  9   EGFRNONAA  57.6*  51.3*  >60.0  >60.0     Urine Culture:   Recent Labs   Lab  08/16/18   0548  08/20/18   1233   LABURIN  KLEBSIELLA PNEUMONIAE ESBL  >100,000 cfu/ml    ENTEROCOCCUS FAECIUM  > 100,000 cfu/ml    CANDIDA ALBICANS  10,000 - 49,999 cfu/ml  Treatment of asymptomatic candiduria is  not recommended (except for   specific populations). Candida isolated in the urine typically   represents colonization. If an indwelling urinary catheter is present  it should be removed or replaced.       Urine Studies:   Recent Labs   Lab  08/17/18   2315   COLORU  Stacy   APPEARANCEUA  Clear   PHUR  6.0   SPECGRAV  1.020   PROTEINUA  Negative   GLUCUA  1+*   KETONESU  Negative   BILIRUBINUA  Negative   OCCULTUA  Negative   NITRITE  Negative   UROBILINOGEN  2.0   LEUKOCYTESUR  2+*   RBCUA  1   WBCUA  30*   BACTERIA  Few*   SQUAMEPITHEL  5     Wound Culture:   Recent Labs   Lab  08/15/18   1412  08/21/18   1406   LABAERO  No growth  No growth       Significant Imaging: I have reviewed all pertinent imaging results/findings within the past 24 hours.

## 2018-08-23 NOTE — ASSESSMENT & PLAN NOTE
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  She was seen by ID last week for liver transplant evaluation; ID was re-consulted for evaluation of suspected sepsis given hypotension and tachycardia.  She is now pancytopenic.    Blood cultures 8/16 and 8/17- NGTD  Ascitic fluid cultures NGTD - cell count negative for SBP.  Most recent paracentesis 8/21 - cell count wnl  Urine culture 8/16 - ESBL K. Pneumo and E. Faecium (vanc sensitive), though patient asymptomatic.    Repeat urine culture 8/20 - candida albicans (10-49K).  She did have complaints of groin rash, improved from earlier in week.   CXR 8/21 - mild left sided pleural effusion with associated mild left lower lobar consolidation/atelectasis  Procalcitonin 8/16 0.25; repeat procalcitonin yesterday 0.59  Lactate 2.4  CT chest shows moderate left-sided dependent pleural effusion with associated relaxation atelectasis of the adjacent lung base as well as two solid right pulmonary nodules with the largest measuring 6 mm    No new complaints. Pt feels better overall. Family at bedside.     Patient is now listed for liver transplant.     Plan/Recommendations:  1.  Continue IV vancomycin 750 mg q 24 hours; f/u on vanc trough  2.  D/c ertapenem and start zosyn 4.5 gram IV q 8 hours (for pseudomonal coverage) given pt's ANC has decreased  3.  In view of patient now being listed for liver transplant, increased procalcitonin and CT chest findings, recommend pulmonology consult for evaluation of left sided pleural effusion  4.  Cryptococcal antigen pending  5.  Recommend surveillance CT chest in 3 months to monitor pulmonary nodules seen on CT  6.  Patient will be moved to immunocompromised ID team tomorrow

## 2018-08-23 NOTE — SUBJECTIVE & OBJECTIVE
Interval History: pt feels better overall.    Review of Systems   Constitutional: Positive for activity change and fatigue. Negative for appetite change, chills, diaphoresis and fever.   HENT: Negative for congestion, dental problem, sinus pressure, sinus pain, sneezing, sore throat and trouble swallowing.    Eyes: Negative.    Respiratory: Positive for cough (dry, intermittent). Negative for shortness of breath and wheezing.    Cardiovascular: Negative for chest pain, palpitations and leg swelling.   Gastrointestinal: Positive for abdominal distention and diarrhea (intermittent with lactulose). Negative for abdominal pain, nausea and vomiting.   Genitourinary: Negative for difficulty urinating, dysuria, flank pain, frequency, hematuria and urgency.   Musculoskeletal: Negative for arthralgias, back pain and neck pain.   Skin: Negative for color change.   Neurological: Negative for dizziness and weakness.   Hematological: Negative for adenopathy. Bruises/bleeds easily.   Psychiatric/Behavioral: Negative for decreased concentration. The patient is not nervous/anxious.      Objective:     Vital Signs (Most Recent):  Temp: 97.9 °F (36.6 °C) (08/23/18 0721)  Pulse: 87 (08/23/18 1131)  Resp: 18 (08/23/18 0721)  BP: (!) 92/51 (08/23/18 0721)  SpO2: 96 % (08/23/18 0721) Vital Signs (24h Range):  Temp:  [97.9 °F (36.6 °C)-99.6 °F (37.6 °C)] 97.9 °F (36.6 °C)  Pulse:  [63-92] 87  Resp:  [16-18] 18  SpO2:  [95 %-97 %] 96 %  BP: (76-92)/(42-51) 92/51     Weight: 55.2 kg (121 lb 11.1 oz)  Body mass index is 20.25 kg/m².    Estimated Creatinine Clearance: 51.4 mL/min (based on SCr of 0.9 mg/dL).    Physical Exam   Constitutional: She is oriented to person, place, and time. No distress.   Chronically ill appearing   HENT:   Head: Normocephalic.   Mouth/Throat: She does not have dentures. No oral lesions. Normal dentition. No oropharyngeal exudate.   Eyes: Conjunctivae are normal. No scleral icterus.   Neck: Normal range of motion.  Neck supple.   Cardiovascular: Normal rate, regular rhythm and normal heart sounds. Exam reveals no gallop and no friction rub.   No murmur heard.  Pulmonary/Chest: Effort normal. No respiratory distress.   Abdominal: She exhibits distension. There is no tenderness. There is no guarding.   Firm     Musculoskeletal: She exhibits no edema.   Neurological: She is alert and oriented to person, place, and time.   Skin: Skin is warm and dry. She is not diaphoretic.   Psychiatric: She has a normal mood and affect.   Vitals reviewed.      Significant Labs:   Blood Culture:   Recent Labs   Lab  08/14/18   1149  08/16/18   0214  08/16/18   0221  08/17/18   2236   LABBLOO  No growth after 5 days.  No growth after 5 days.  No growth after 5 days.  No growth after 5 days.  No growth after 5 days.  No growth after 5 days.     CBC:   Recent Labs   Lab  08/22/18   1322  08/22/18   1619  08/23/18   0351   WBC  1.28*  1.42*  1.60*   HGB  8.9*  8.6*  8.5*   HCT  25.9*  25.9*  25.9*   PLT  35*  35*  31*     CMP:   Recent Labs   Lab  08/22/18   0019  08/22/18   0532  08/22/18   0832  08/23/18   0351   NA  132*  130*  132*  130*   K  2.8*  4.3  5.0  4.2   CL  105  101  105  105   CO2  18*  20*  19*  16*   GLU  234*  252*  221*  125*   BUN  40*  43*  40*  41*   CREATININE  1.0  1.1  0.9  0.9   CALCIUM  7.2*  8.2*  8.4*  8.1*   PROT  4.7*  5.4*   --   5.0*   ALBUMIN  3.9  4.3   --   4.0   BILITOT  1.3*  3.0*   --   2.4*   ALKPHOS  254*  317*   --   318*   AST  134*  187*   --   187*   ALT  71*  93*   --   95*   ANIONGAP  9  9  8  9   EGFRNONAA  57.6*  51.3*  >60.0  >60.0     Urine Culture:   Recent Labs   Lab  08/16/18   0548  08/20/18   1233   LABURIN  KLEBSIELLA PNEUMONIAE ESBL  >100,000 cfu/ml    ENTEROCOCCUS FAECIUM  > 100,000 cfu/ml    CANDIDA ALBICANS  10,000 - 49,999 cfu/ml  Treatment of asymptomatic candiduria is not recommended (except for   specific populations). Candida isolated in the urine typically   represents  colonization. If an indwelling urinary catheter is present  it should be removed or replaced.       Urine Studies:   Recent Labs   Lab  08/17/18   2315   COLORU  Stacy   APPEARANCEUA  Clear   PHUR  6.0   SPECGRAV  1.020   PROTEINUA  Negative   GLUCUA  1+*   KETONESU  Negative   BILIRUBINUA  Negative   OCCULTUA  Negative   NITRITE  Negative   UROBILINOGEN  2.0   LEUKOCYTESUR  2+*   RBCUA  1   WBCUA  30*   BACTERIA  Few*   SQUAMEPITHEL  5     Wound Culture:   Recent Labs   Lab  08/15/18   1412  08/21/18   1406   LABAERO  No growth  No growth       Significant Imaging: I have reviewed all pertinent imaging results/findings within the past 24 hours.

## 2018-08-23 NOTE — PROGRESS NOTES
[facility]  Infectious Disease  Progress Note    Patient Name: Scarlett Reddy  MRN: [id number]  Admission Date: 8/14/2018  Length of Stay: 9 days  Attending Physician: Gigi Sprague MD  Primary Care Provider: Primary Doctor No    Isolation Status: Contact  Assessment/Plan:      Neutropenia    68 year old with Hepatitis B and D cirrhosis on Entecavir (Hep B DNA not detected on 5/2018), c/b esophageal varices, DM who was transferred from [facility] [place] for inpatient liver transplant evaluation.  She was seen by ID last week for liver transplant evaluation; ID was re-consulted for evaluation of suspected sepsis given hypotension and tachycardia.  She is now pancytopenic.    Blood cultures 8/16 and 8/17- NGTD  Ascitic fluid cultures NGTD - cell count negative for SBP.  Most recent paracentesis 8/21 - cell count wnl  Urine culture 8/16 - ESBL K. Pneumo and E. Faecium (vanc sensitive), though patient asymptomatic.    Repeat urine culture 8/20 - candida albicans (10-49K).  She did have complaints of groin rash, improved from earlier in week.   CXR 8/21 - mild left sided pleural effusion with associated mild left lower lobar consolidation/atelectasis  Procalcitonin 8/16 0.25; repeat procalcitonin yesterday 0.59  Lactate 2.4  CT chest shows moderate left-sided dependent pleural effusion with associated relaxation atelectasis of the adjacent lung base as well as two solid right pulmonary nodules with the largest measuring 6 mm    No new complaints. Pt feels better overall. Family at bedside.     Patient is now listed for liver transplant.     Plan/Recommendations:  1.  Continue IV vancomycin 750 mg q 24 hours; f/u on vanc trough  2.  D/c ertapenem and start zosyn 4.5 gram IV q 8 hours (for pseudomonal coverage) given pt's ANC < 800  3.  In view of patient now being listed for liver transplant, increased procalcitonin and CT chest findings, recommend pulmonology consult for evaluation of left sided pleural effusion  4.   Cryptococcal antigen pending  5.  Recommend surveillance CT chest in 3 months to monitor pulmonary nodules seen on CT  6.  Patient will be moved to immunocompromised ID team tomorrow given she is now listed as active for liver transplant          Thank you for your consult. I will follow-up with patient. Please contact us if you have any additional questions.  GREGORIO Hawthorne Pager: [phone]    Infectious Disease  [facility]    Subjective:     Principal Problem:Pre-transplant evaluation for chronic liver disease    HPI:   Ms. Reddy is a 68 year old with Hepatitis B and D cirrhosis on Entecavir (Hep B DNA not detected on 5/2018), c/b esophageal varices, DM who was transferred from [facility] for inpatient liver transplant evaluation.  She was seen by ID last week for liver transplant evaluation - no contraindications noted from ID perspective.  ID is re-consulted for evaluation of suspected sepsis given hypotension and tachycardia.      Blood cultures NGTD  Ascitic fluid cultures NGTD - cell count negative for SBP  Urine culture - ESBL K. Pneumo and E. Faecium (vanc sensitive)   CXR on admit was clear.    Leukopenic - ANC 1000  Thrombocytopenic  Lactic acid 2.5     Son at bedside today provided translation.  She denies fevers, chills, sweats.  No sinus congestion, sore throat.  Complaining of dry intermittent cough, no SOB.  O2 sats % on RA.  Denies dysuria, frequency.  No suprapubic or flank pain.  Complains of abdominal distension, but no abdominal pain.  No nausea/vomiting.  Denies skin wounds/breakdown.   Primary complaint is fatigue.   Interval History: pt feels better overall.    Review of Systems   Constitutional: Positive for activity change and fatigue. Negative for appetite change, chills, diaphoresis and fever.   HENT: Negative for congestion, dental problem, sinus pressure, sinus pain, sneezing, sore throat and trouble swallowing.    Eyes: Negative.    Respiratory: Positive for  cough (dry, intermittent). Negative for shortness of breath and wheezing.    Cardiovascular: Negative for chest pain, palpitations and leg swelling.   Gastrointestinal: Positive for abdominal distention and diarrhea (intermittent with lactulose). Negative for abdominal pain, nausea and vomiting.   Genitourinary: Negative for difficulty urinating, dysuria, flank pain, frequency, hematuria and urgency.   Musculoskeletal: Negative for arthralgias, back pain and neck pain.   Skin: Negative for color change.   Neurological: Negative for dizziness and weakness.   Hematological: Negative for adenopathy. Bruises/bleeds easily.   Psychiatric/Behavioral: Negative for decreased concentration. The patient is not nervous/anxious.      Objective:     Vital Signs (Most Recent):  Temp: 97.9 °F (36.6 °C) (08/23/18 0721)  Pulse: 87 (08/23/18 1131)  Resp: 18 (08/23/18 0721)  BP: (!) 92/51 (08/23/18 0721)  SpO2: 96 % (08/23/18 0721) Vital Signs (24h Range):  Temp:  [97.9 °F (36.6 °C)-99.6 °F (37.6 °C)] 97.9 °F (36.6 °C)  Pulse:  [63-92] 87  Resp:  [16-18] 18  SpO2:  [95 %-97 %] 96 %  BP: (76-92)/(42-51) 92/51     Weight: 55.2 kg (121 lb 11.1 oz)  Body mass index is 20.25 kg/m².    Estimated Creatinine Clearance: 51.4 mL/min (based on SCr of 0.9 mg/dL).    Physical Exam   Constitutional: She is oriented to person, place, and time. No distress.   Chronically ill appearing   HENT:   Head: Normocephalic.   Mouth/Throat: She does not have dentures. No oral lesions. Normal dentition. No oropharyngeal exudate.   Eyes: Conjunctivae are normal. No scleral icterus.   Neck: Normal range of motion. Neck supple.   Cardiovascular: Normal rate, regular rhythm and normal heart sounds. Exam reveals no gallop and no friction rub.   No murmur heard.  Pulmonary/Chest: Effort normal. No respiratory distress.   Abdominal: She exhibits distension. There is no tenderness. There is no guarding.   Firm     Musculoskeletal: She exhibits no edema.   Neurological:  She is alert and oriented to person, place, and time.   Skin: Skin is warm and dry. She is not diaphoretic.   Psychiatric: She has a normal mood and affect.   Vitals reviewed.      Significant Labs:   Blood Culture:   Recent Labs   Lab  08/14/18   1149  08/16/18   0214  08/16/18   0221  08/17/18   2236   LABBLOO  No growth after 5 days.  No growth after 5 days.  No growth after 5 days.  No growth after 5 days.  No growth after 5 days.  No growth after 5 days.     CBC:   Recent Labs   Lab  08/22/18   1322  08/22/18   1619  08/23/18   0351   WBC  1.28*  1.42*  1.60*   HGB  8.9*  8.6*  8.5*   HCT  25.9*  25.9*  25.9*   PLT  35*  35*  31*     CMP:   Recent Labs   Lab  08/22/18   0019  08/22/18   0532  08/22/18   0832  08/23/18   0351   NA  132*  130*  132*  130*   K  2.8*  4.3  5.0  4.2   CL  105  101  105  105   CO2  18*  20*  19*  16*   GLU  234*  252*  221*  125*   BUN  40*  43*  40*  41*   CREATININE  1.0  1.1  0.9  0.9   CALCIUM  7.2*  8.2*  8.4*  8.1*   PROT  4.7*  5.4*   --   5.0*   ALBUMIN  3.9  4.3   --   4.0   BILITOT  1.3*  3.0*   --   2.4*   ALKPHOS  254*  317*   --   318*   AST  134*  187*   --   187*   ALT  71*  93*   --   95*   ANIONGAP  9  9  8  9   EGFRNONAA  57.6*  51.3*  >60.0  >60.0     Urine Culture:   Recent Labs   Lab  08/16/18   0548  08/20/18   1233   LABURIN  KLEBSIELLA PNEUMONIAE ESBL  >100,000 cfu/ml    ENTEROCOCCUS FAECIUM  > 100,000 cfu/ml    CANDIDA ALBICANS  10,000 - 49,999 cfu/ml  Treatment of asymptomatic candiduria is not recommended (except for   specific populations). Candida isolated in the urine typically   represents colonization. If an indwelling urinary catheter is present  it should be removed or replaced.       Urine Studies:   Recent Labs   Lab  08/17/18   2315   COLORU  Stacy   APPEARANCEUA  Clear   PHUR  6.0   SPECGRAV  1.020   PROTEINUA  Negative   GLUCUA  1+*   KETONESU  Negative   BILIRUBINUA  Negative   OCCULTUA  Negative   NITRITE  Negative   UROBILINOGEN  2.0    LEUKOCYTESUR  2+*   RBCUA  1   WBCUA  30*   BACTERIA  Few*   SQUAMEPITHEL  5     Wound Culture:   Recent Labs   Lab  08/15/18   1412  08/21/18   1406   LABAERO  No growth  No growth       Significant Imaging: I have reviewed all pertinent imaging results/findings within the past 24 hours.

## 2018-08-23 NOTE — PT/OT/SLP EVAL
Occupational Therapy   Evaluation    Name: Scarlett Reddy  MRN: [id number]  Admitting Diagnosis:  Pre-transplant evaluation for chronic liver disease 6 Days Post-Op    Recommendations:     Discharge Recommendations: home with home health  Discharge Equipment Recommendations:  none  Barriers to discharge:       History:     Occupational Profile:  present.  Living Environment: Pt is from [facility] but will be staying at the [facility] until approved for liver transplant.  Previous level of function: (I) with ADLs/walking but slow and with some difficulty --- mostly a home ambulator.  Equipment Used at Home:  none  Assistance upon Discharge: family    Past Medical History:   Diagnosis Date    Ascites     Cirrhosis     Esophageal varices     Hepatic encephalopathy     Hepatitis B     Hepatitis D virus infection     Hypersplenism     Portal hypertension        History reviewed. No pertinent surgical history.    Subjective     Pain/Comfort:  · Pain Rating 1: 0/10    Patients cultural, spiritual, Denominational conflicts given the current situation:      Objective:     Communicated with: RN prior to session.  Patient found call button in reach and   upon OT entry to room.    General Precautions: Standard, fall, neutropenic, contact   Orthopedic Precautions:    Braces:       Occupational Performance:    Bed Mobility:    · Patient completed Rolling/Turning to Left with  supervision  · Patient completed Scooting/Bridging with supervision  · Patient completed Supine to Sit with supervision    Functional Mobility/Transfers:  · Patient completed Sit <> Stand Transfer with stand by assistance  with  no assistive device   · Patient completed Bed <> Chair Transfer using Step Transfer technique with stand by assistance with no assistive device  · Functional Mobility: SBA walking around room with no AD.    Activities of Daily Living:  · Feeding:  independence   · Grooming: supervision   · Upper Body Dressing:  "supervision    Cognitive/Visual Perceptual:  Cognitive/Psychosocial Skills:     -       Oriented to: Person, Place, Time and Situation   -       Safety awareness/insight to disability: intact     Physical Exam:  BUE AROM/MMT: WNL    AMPAC 6 Click ADL:  AMPAC Total Score: 22    Treatment & Education:  UE ROM/MMT  Bed mobility training / assessment  Functional mobility assessment  Sit/standing balance assessment  Educated on importance of sitting OOB in bedside chair to promote increased strength, endurance & breathing.  Discussed OT POC / Post-acute plan  Education:    Patient left up in chair with call button in reach    Assessment:     Scarlett Reddy is a 68 y.o. female with a medical diagnosis of Pre-transplant evaluation for chronic liver disease.  She presents with the following performance deficits affecting function: weakness, impaired endurance, impaired self care skills, gait instability, decreased coordination.  Continue OT to increase overall strength/endurance in preparation for liver transplant and to reduce further functional decline.    Rehab Prognosis: Good; patient would benefit from acute skilled OT services to address these deficits and reach maximum level of function.         Clinical Decision Makin.  OT Low:  "Pt evaluation falls under low complexity for evaluation coding due to performance deficits noted in 1-3 areas as stated above and 0 co-morbities affecting current functional status. Data obtained from problem focused assessments. No modifications or assistance was required for completion of evaluation. Only brief occupational profile and history review completed."     Plan:     Patient to be seen 2 x/week to address the above listed problems via self-care/home management, therapeutic activities, therapeutic exercises  · Plan of Care Expires: 18  · Plan of Care Reviewed with: patient, spouse, daughter    This Plan of care has been discussed with the patient who was involved in " its development and understands and is in agreement with the identified goals and treatment plan    GOALS:   Multidisciplinary Problems     Occupational Therapy Goals        Problem: Occupational Therapy Goal    Goal Priority Disciplines Outcome Interventions   Occupational Therapy Goal     OT, PT/OT Ongoing (interventions implemented as appropriate)    Description:  Goals to be met by: 8/30/18    Patient will increase functional independence with ADLs by performing:    LE Dressing with Modified [place].  Grooming while standing at sink with Modified [place].  Toileting from toilet with Modified [place] for hygiene and clothing management.   Supine to sit with [place].  Step transfer with Modified [place]  Toilet transfer to toilet with Modified [place].                      Time Tracking:     OT Date of Treatment: 08/23/18  OT Start Time: 0953  OT Stop Time: 1019  OT Total Time (min): 26 min    Billable Minutes:Evaluation 16  Therapeutic Activity 10    ELLIS Short  8/23/2018

## 2018-08-23 NOTE — PLAN OF CARE
Problem: Physical Therapy Goal  Goal: Physical Therapy Goal  Goals to be met by: 9/3/18     Patient will increase functional independence with mobility by performin. Sit to stand transfer with [place]  2. Bed to chair transfer with [place] using LRD or no AD.  3. Gait  x 200 feet with Supervision using LRD or no AD.  4. Lower extremity exercise program x20 reps per handout, with independence    Outcome: Ongoing (interventions implemented as appropriate)  Patient evaluated today. All goals established are appropriate for patient progression at this time.   Filemon Cowan PT, DPT  2018  Pager: [phone]

## 2018-08-23 NOTE — PROGRESS NOTES
"[facility]  Liver Transplant  Progress Note    Patient Name: Scarlett Reddy  MRN: [id number]  Admission Date: 8/14/2018  Hospital Length of Stay: 9 days  Code Status: Full Code  Primary Care Provider: Primary Doctor No    Subjective:     History of Present Illness:  HPI:  Scarlett Reddy is a 67 y/o female with past medical history of Hep B and D cirrhosis, on Entecavir with undetectable Hep B DNA, c/b EV with bleeding, HE,  Previous H/O PV thrombosis, and refractory ascites, T2DM, who presented as a transfer from [facility] for evaluation for liver transplant surgery. Per records, the patient has been declining recently with hyponatremia due to diuretics (Na of 121), that improved to 129 with holding diuretics, but now requiring paracentesis every 7-10 days. She presented to a hospital in [facility] for progressive weakness, found to have worsening hyponatremia. Per son report, the patient declined rapidly at the end of the trip to [place], and had to be transported by EMS.  On admit, patient was somnolent and improved w lactulose enema.      Patient since admission feels well .She states that she does not remember the details of her trip. She states that she had a paracentesis just before flying to [place]. She denies f/c, denies N/V, deines abdominal pain. Endorses abdominal swelling. Denies melena or hematemesis.    Patient was also on Lovenox for suspected portal vein thrombosis in [facility], of note PV patent on all studies since admit.  The patient notes she was active and independent prior to these event. She denies alcohol use. Labs on presentation notable for PLT of 85, Na 124, Cr 1.4, ALKP 658, Tbili 2.1, AST//160       PSH: D&C for Miscarriage >30 years ago, lower midline scar, denies hysterectomy/c section.      Estimated body mass index is 20.83 kg/m² as calculated from the following:    Height as of this encounter: 5' 5" (1.651 m).    Weight as of this encounter: 56.8 kg (125 lb 3.2 " oz).     Blood Group : A POS     : Alexa    MELD-Na score: 18 at 8/23/2018  3:51 AM  MELD score: 12 at 8/23/2018  3:51 AM  Calculated from:  Serum Creatinine: 0.9 mg/dL (Rounded to 1 mg/dL) at 8/23/2018  3:51 AM  Serum Sodium: 130 mmol/L at 8/23/2018  3:51 AM  Total Bilirubin: 2.4 mg/dL at 8/23/2018  3:51 AM  INR(ratio): 1.2 at 8/23/2018  3:51 AM  Age: 68 years      Hospital Course:  Hospital course:  Work up for liver transplant completed.  Patient listed for liver transplant 8/22/18 w MELD 18.    INTERVAL HISTORY:  No acute events overnight.  Will request bed on TSU.  Alert and oriented.  Appetite good.  She denies abdominal pain.  Abdomen is soft.  Last Para 8/21/18- 4700 ml removed, wbc 81/segs 2.  On admit patient had positive urine cx- (+) kleb pneumo and Enterococcus faecium.  While awaiting cx results, she was treated empirically w Vanc and Cipro.   Cipro was changed to ertapenem on 8/20/18 per ID.  Heme/Onc was also consulted for pancytopenia felt likely drug related.  Today her ANC is 739.  ID changed Ertapenem to Zosyn for empiric pseudomonas coverage.  D/w PA w ID need for abx and possible discharge tomorrow.    Interval History: pt feels better overall.    Review of Systems   Constitutional: Positive for activity change and fatigue. Negative for appetite change, chills, diaphoresis and fever.   HENT: Negative for congestion, dental problem, sinus pressure, sinus pain, sneezing, sore throat and trouble swallowing.    Eyes: Negative.    Respiratory: Positive for cough (dry, intermittent). Negative for shortness of breath and wheezing.    Cardiovascular: Negative for chest pain, palpitations and leg swelling.   Gastrointestinal: Positive for abdominal distention and diarrhea (intermittent with lactulose). Negative for abdominal pain, nausea and vomiting.   Genitourinary: Negative for difficulty urinating, dysuria, flank pain, frequency, hematuria and urgency.   Musculoskeletal: Negative for  arthralgias, back pain and neck pain.   Skin: Negative for color change and wound.   Neurological: Positive for weakness. Negative for dizziness.   Hematological: Negative for adenopathy. Bruises/bleeds easily.   Psychiatric/Behavioral: Negative for decreased concentration. The patient is not nervous/anxious.      Objective:     Vital Signs (Most Recent):  Temp: 97.9 °F (36.6 °C) (08/23/18 0721)  Pulse: 87 (08/23/18 1131)  Resp: 18 (08/23/18 0721)  BP: (!) 92/51 (08/23/18 0721)  SpO2: 96 % (08/23/18 0721) Vital Signs (24h Range):  Temp:  [97.9 °F (36.6 °C)-99.6 °F (37.6 °C)] 97.9 °F (36.6 °C)  Pulse:  [63-92] 87  Resp:  [16-18] 18  SpO2:  [95 %-97 %] 96 %  BP: (76-92)/(42-51) 92/51     Weight: 55.2 kg (121 lb 11.1 oz)  Body mass index is 20.25 kg/m².    Estimated Creatinine Clearance: 51.4 mL/min (based on SCr of 0.9 mg/dL).    Physical Exam   Constitutional: She is oriented to person, place, and time. She appears well-developed. No distress.   Chronically ill appearing  (+) hand or temporal muscle wasting noted     HENT:   Head: Normocephalic.   Mouth/Throat: She does not have dentures. No oral lesions. Normal dentition. No oropharyngeal exudate.   Eyes: Conjunctivae are normal. No scleral icterus.   Neck: Normal range of motion. Neck supple.   Cardiovascular: Normal rate, regular rhythm and normal heart sounds. Exam reveals no gallop and no friction rub.   No murmur heard.  Pulmonary/Chest: Effort normal. No respiratory distress.   Abdominal: She exhibits distension. There is no tenderness. There is no guarding.   Firm     Musculoskeletal: She exhibits no edema.   Neurological: She is alert and oriented to person, place, and time.   Skin: Skin is warm and dry. She is not diaphoretic.   Psychiatric: She has a normal mood and affect. Her behavior is normal.   Vitals reviewed.      Significant Labs:   Blood Culture:   Recent Labs   Lab  08/14/18   1149  08/16/18   0214  08/16/18   0221  08/17/18   2236   LABBLOO  No  growth after 5 days.  No growth after 5 days.  No growth after 5 days.  No growth after 5 days.  No growth after 5 days.  No growth after 5 days.     CBC:   Recent Labs   Lab  08/22/18   1322  08/22/18   1619  08/23/18   0351   WBC  1.28*  1.42*  1.60*   HGB  8.9*  8.6*  8.5*   HCT  25.9*  25.9*  25.9*   PLT  35*  35*  31*     CMP:   Recent Labs   Lab  08/22/18   0019  08/22/18   0532  08/22/18   0832  08/23/18   0351   NA  132*  130*  132*  130*   K  2.8*  4.3  5.0  4.2   CL  105  101  105  105   CO2  18*  20*  19*  16*   GLU  234*  252*  221*  125*   BUN  40*  43*  40*  41*   CREATININE  1.0  1.1  0.9  0.9   CALCIUM  7.2*  8.2*  8.4*  8.1*   PROT  4.7*  5.4*   --   5.0*   ALBUMIN  3.9  4.3   --   4.0   BILITOT  1.3*  3.0*   --   2.4*   ALKPHOS  254*  317*   --   318*   AST  134*  187*   --   187*   ALT  71*  93*   --   95*   ANIONGAP  9  9  8  9   EGFRNONAA  57.6*  51.3*  >60.0  >60.0     Urine Culture:   Recent Labs   Lab  08/16/18   0548  08/20/18   1233   LABURIN  KLEBSIELLA PNEUMONIAE ESBL  >100,000 cfu/ml    ENTEROCOCCUS FAECIUM  > 100,000 cfu/ml    CANDIDA ALBICANS  10,000 - 49,999 cfu/ml  Treatment of asymptomatic candiduria is not recommended (except for   specific populations). Candida isolated in the urine typically   represents colonization. If an indwelling urinary catheter is present  it should be removed or replaced.       Urine Studies:   Recent Labs   Lab  08/17/18   2315   COLORU  Stacy   APPEARANCEUA  Clear   PHUR  6.0   SPECGRAV  1.020   PROTEINUA  Negative   GLUCUA  1+*   KETONESU  Negative   BILIRUBINUA  Negative   OCCULTUA  Negative   NITRITE  Negative   UROBILINOGEN  2.0   LEUKOCYTESUR  2+*   RBCUA  1   WBCUA  30*   BACTERIA  Few*   SQUAMEPITHEL  5     Wound Culture:   Recent Labs   Lab  08/15/18   1412  08/21/18   1406   LABAERO  No growth  No growth       Significant Imaging: I have reviewed all pertinent imaging results/findings within the past 24 hours.    Assessment/Plan:     *  Pre-transplant evaluation for chronic liver disease      67 y/o female with past medical history of Hep B and D cirrhosis, on entecavir with undetectable Hep B DNA, c/b EV with bleeding, HE,  Previous H/O PV thrombosis, and refractory ascites, T2DM, who presented as a transfer from [facility] for evaluation of liver transplant surgery. The patient has been declining recently with hyponatremia.     MRI/USS done at [facility] shows patent PV vein. She appears to be a suitable candidate for liver transplant provided the dobutamine stress test shows stable cardiac status..    Surgical Complexity A  Medical Complexity A        Hyponatremia    - improved          Hypotension    - cont midodrine 15 mg tid.  Albumin x3.            Anemia requiring transfusions    - cbc stable.            Severe malnutrition    - dietary consulted and following.            Esophageal varices without bleeding    - H/H stable.  Cont to trend.          Type 2 diabetes mellitus, with long-term current use of insulin    - blood glucose reading improved today, cont to monitor.          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    - cont Entecavir.          Liver transplant candidate    MELD-Na score: 18 at 8/23/2018  3:51 AM  MELD score: 12 at 8/23/2018  3:51 AM  Calculated from:  Serum Creatinine: 0.9 mg/dL (Rounded to 1 mg/dL) at 8/23/2018  3:51 AM  Serum Sodium: 130 mmol/L at 8/23/2018  3:51 AM  Total Bilirubin: 2.4 mg/dL at 8/23/2018  3:51 AM  INR(ratio): 1.2 at 8/23/2018  3:51 AM  Age: 68 years            Hepatic encephalopathy    - well managed w lactulose and Rifaximin.              VTE Risk Mitigation (From admission, onward)    None          The patients clinical status was discussed at multidisplinary rounds, involving transplant surgery, transplant medicine, pharmacy, nursing, nutrition, and social work    Discharge Planning:  Possible discharge tomorrow.    Meghna Maldonado, NP  Liver Transplant  [facility]

## 2018-08-23 NOTE — PHYSICIAN QUERY
PT Name: Scarlett Reddy  MR #: [id number]    Physician Query Form - Cause and Effect Relationship Clarification      CDS Laura Torres RN, BSN        Contact Information:  [phone]    [email or web address]           This form is a permanent document in the medical record.     Query Date: August 23, 2018    By submitting this query, we are merely seeking further clarification of documentation. Please utilize your independent clinical judgment when addressing the question(s) below.    The Medical record contains the following:  Supporting Clinical Findings   Location in record     sepsis        ID is now re-consulted for evaluation of suspected sepsis given hypotension and tachycardia.                                                                                                                                                                                    ER MD notes    8/20 ID PN filed @ 8:37pm      Hepatic Encephalopathy    Decompensated Liver cirrhosis due to        Hep B And D        ESBL/E faecium UTI       KLEBSIELLA PNEUMONIAE ESBL   >100,000 cfu/ml      Urine Culture, Routine   ENTEROCOCCUS FAECIUM   > 100,000 cfu/ml     Sepsis                                                                                                                                                                                      H&P and PNs           Urine culture 8/16                   Physician Query Form -Systemic Infectious Process Clarification 8/21/18         Provider, please clarify if there is any correlation     sepsis    Between    KLEBSIELLA PNEUMONIAE ESBL  And ENTEROCOCCUS FAECIUM               Are the conditions:     [  ] Due to or associated with each other     [ x  ] Unrelated to each other     [  ] Other (Please Specify): _________________________     [  ] Clinically Undetermined

## 2018-08-23 NOTE — PT/OT/SLP EVAL
Physical Therapy Evaluation    Patient Name:  Scarlett Reddy   MRN:  [id number]    Recommendations:     Discharge Recommendations:  (home)   Discharge Equipment Recommendations: none   Barriers to discharge: Inaccessible home    Assessment:     Scarlett Reddy is a 68 y.o. female admitted with a medical diagnosis of Pre-transplant evaluation for chronic liver disease.  She presents with the following impairments/functional limitations:  impaired endurance, weakness Patient tolerated evaluaiton  well. Patient limited at this time by contact precaution at this time. Plan to evaluate endurance further at follow up visit. Pt close to baseline this time but well be seen 2x per week while in the hospital to ensure no decrease in functional mobility. Patient will continue to require skilled PT services to address the above impairments to return to prior level of function as independent as possible. Plan to d/c home ( [facility] ) until transplant then ultimately return home to [facility].  .    Rehab Prognosis:  good; patient would benefit from acute skilled PT services to address these deficits and reach maximum level of function.      Recent Surgery: Procedure(s) (LRB):  EGD (ESOPHAGOGASTRODUODENOSCOPY) (N/A) 6 Days Post-Op    Plan:     During this hospitalization, patient to be seen 2 x/week to address the above listed problems via gait training, therapeutic activities, therapeutic exercises  · Plan of Care Expires:  09/22/18   Plan of Care Reviewed with: patient, spouse, daughter(ranslator present)    Subjective     Communicated with RN prior to session.  Patient found supine upon PT entry to room, agreeable to evaluation.      Chief Complaint: none pt very pleasant  Patient comments/goals: to get better and get home at some point  Pain/Comfort:  · Pain Rating 1: 0/10  · Pain Rating Post-Intervention 1: 0/10  · Pain Rating Post-Intervention 2: 0/10    Patients cultural, spiritual, Episcopal conflicts given the current  "situation: [facility]    Living Environment:  Pt lives in [place] and plans to stay in [place].   Prior to admission, patients level of function was mod ( I ) with all activities. Ambulating short distances in her home but no active in the community PTA. .  Patient has the following equipment:  .  DME owned (not currently used): pt states she " has it all " back in [place].  Upon discharge, patient will have assistance from family ( family plans to be present entire time pt is in [place] ).    Objective:     Patient found with: (all lines intact)     General Precautions: Standard, fall, contact(neutropenic )   Orthopedic Precautions:N/A   Braces: N/A       Exams    Cognition:  Patient is Oriented X 4, Alert and Cooperative  Patient Follows multistep  commands 100 % of the time.     Coordination  · WFL  · Other noted coordination deficits: none    Sensation  Patient's sensation was Intact to light touch  bilaterally at BUE/LE    Skin integrity    · -       Skin integrity: Visible skin intact     Posture  · -       Rounded shoulders  · -       Forward head    ROM and Strength   LLE MMT RLE MMT   Hip flexion 4+/5 4+/5   Knee ext 4+/5 4+/5   Knee flex 4+/5 4+/5   Ankle DF 4+/5 4+/5   Ankle PF 4+/5 4+/5   Other          LLE ROM RLE ROM   Hip flexion within normal limits within normal limits   Knee ext within normal limits within normal limits   Knee flex within normal limits within normal limits   Ankle PF within normal limits within normal limits   Ankle DF within normal limits within normal limits       Functional Mobilty    Bed Mobility:  Rolling Left:  supervision  Scooting: supervision  Supine to Sit: supervision  Transfers:  Sit to Stand:  stand by assistance with no AD  Bed to Chair: stand by assistance with  no AD  using  Step Transfer    Gait    Patient ambulated FWB/WBAT: bilateral lower extremity 36  feet Supervision or Set-up Assistance on level tile with No Assistive Device  .   Pdemonstarting a  2-point gait " and swing-through gait with decreased giuseppe.Impairments contributing to gait deviations include decreased strength    Sitting Balance Static  Dynamic     GOOD: Takes MODERATE challenges from all directions GOOD: Maintains balance through MODERATE excursions of active trunk movement   Standing Balance GOOD-: Takes MODERATE challenges from all directions inconsistently GOOD-: Needs SUPERVISION only during gait and able to self right with moderate LOB inconsistently     · Standing reaching outside SINA with CGA; no increase in sway or LOB  · Narrow SINA with EO- 30 Sec  · Narrow SINA with EC-15-20 sec    AM-PAC 6 CLICK MOBILITY  Total Score:22       Therapeutic Activities and Exercises:  ·  Whiteboard updated in patients room to current assistance level  · All of patients questions were answered within the scope of PT    Patient education  · Patient educated on the role of PT and POC  · Patient educated on importance  activity while in the hosptial per tolerance for improved endurance and to limit deconditioning   · Patient educated on safe transfers with nursing as appropriate  · Patient educated on energy conservation, pursed lip breathing  · Patient educated on proper transfer mechanics and safety    Patient left up in chair with all lines intact, call button in reach, RN notified and family/ present.    GOALS:   Multidisciplinary Problems     Physical Therapy Goals        Problem: Physical Therapy Goal    Goal Priority Disciplines Outcome Goal Variances Interventions   Physical Therapy Goal     PT, PT/OT Ongoing (interventions implemented as appropriate)     Description:  Goals to be met by: 9/3/18     Patient will increase functional independence with mobility by performin. Sit to stand transfer with Marks  2. Bed to chair transfer with Marks using LRD or no AD.  3. Gait  x 200 feet with Supervision using LRD or no AD.  4. Lower extremity exercise program x20 reps per handout, with  independence                      History:     Past Medical History:   Diagnosis Date    Ascites     Cirrhosis     Esophageal varices     Hepatic encephalopathy     Hepatitis B     Hepatitis D virus infection     Hypersplenism     Portal hypertension        History reviewed. No pertinent surgical history.    Clinical Decision Making:     History  Co-morbidities and personal factors that may impact the plan of care Examination  Body Structures and Functions, activity limitations and participation restrictions that may impact the plan of care Clinical Presentation   Decision Making/ Complexity Score   Co-morbidities:   [] Time since onset of injury / illness / exacerbation  [] Status of current condition  []Patient's cognitive status and safety concerns    [x] Multiple Medical Problems (see med hx)  Personal Factors:   [] Patient's age  [] Prior Level of function   [] Patient's home situation (environment and family support)  [x] Patient's level of motivation  [] Expected progression of patient      HISTORY:(criteria)    [] 65783 - no personal factors/history    [x] 48741 - has 1-2 personal factor/comorbidity     [] 45155 - has >3 personal factor/comorbidity     Body Regions:  [] Objective examination findings  [] Head     []  Neck  [x] Trunk   [] Upper Extremity  [] Lower Extremity    Body Systems:  [] For communication ability, affect, cognition, language, and learning style: the assessment of the ability to make needs known, consciousness, orientation (person, place, and time), expected emotional /behavioral responses, and learning preferences (eg, learning barriers, education  needs)  [x] For the neuromuscular system: a general assessment of gross coordinated movement (eg, balance, gait, locomotion, transfers, and transitions) and motor function  (motor control and motor learning)  [] For the musculoskeletal system: the assessment of gross symmetry, gross range of motion, gross strength, height, and  weight  [] For the integumentary system: the assessment of pliability(texture), presence of scar formation, skin color, and skin integrity  [] For cardiovascular/pulmonary system: the assessment of heart rate, respiratory rate, blood pressure, and edema     Activity limitations:    [] Patient's cognitive status and saf ety concerns          [] Status of current condition      [] Weight bearing restriction  [] Cardiopulmunary Restriction    Participation Restrictions:   [] Goals and goal agreement with the patient     [] Rehab potential (prognosis) and probable outcome      Examination of Body System: (criteria)    [] 10381 - addressing 1-2 elements    [] 68699 - addressing a total of 3 or more elements     [] 69704 -  Addressing a total of 4 or more elements         Clinical Presentation: (criteria)  Stable - 28693     On examination of body system using standardized tests and measures patient presents with 1-2 elements from any of the following: body structures and functions, activity limitations, and/or participation restrictions.  Leading to a clinical presentation that is considered stable and/or uncomplicated                              Clinical Decision Making  (Eval Complexity):  Low- 42235     Time Tracking:     PT Received On: 08/23/18  PT Start Time: 0953     PT Stop Time: 1019  PT Total Time (min): 26 min     Billable Minutes: Evaluation x10 min and Therapeutic Activity x12 mone Cowan, PT  08/23/2018

## 2018-08-23 NOTE — PLAN OF CARE
Problem: Occupational Therapy Goal  Goal: Occupational Therapy Goal  Goals to be met by: 8/30/18    Patient will increase functional independence with ADLs by performing:    LE Dressing with Modified Irion.  Grooming while standing at sink with Modified [place].  Toileting from toilet with Modified Irion for hygiene and clothing management.   Supine to sit with Irion.  Step transfer with Modified Irion  Toilet transfer to toilet with Modified Irion.    Outcome: Ongoing (interventions implemented as appropriate)  Evaluation completed and POC established.    ELLIS Slade

## 2018-08-23 NOTE — ASSESSMENT & PLAN NOTE
MELD-Na score: 18 at 8/23/2018  3:51 AM  MELD score: 12 at 8/23/2018  3:51 AM  Calculated from:  Serum Creatinine: 0.9 mg/dL (Rounded to 1 mg/dL) at 8/23/2018  3:51 AM  Serum Sodium: 130 mmol/L at 8/23/2018  3:51 AM  Total Bilirubin: 2.4 mg/dL at 8/23/2018  3:51 AM  INR(ratio): 1.2 at 8/23/2018  3:51 AM  Age: 68 years

## 2018-08-23 NOTE — TELEPHONE ENCOUNTER
[id number]    Lab Results   Component Value Date    CREATININE 0.9 08/23/2018     (L) 08/23/2018    BILITOT 2.4 (H) 08/23/2018    ALBUMIN 4.0 08/23/2018    INR 1.2 08/23/2018     MELD 18  Encephalopathy: 1 - 2  Ascites: moderate  Dialysis: no     Recertification requestor: Charley Simms

## 2018-08-23 NOTE — PLAN OF CARE
Problem: Patient Care Overview  Goal: Plan of Care Review  Outcome: Ongoing (interventions implemented as appropriate)  Pt A&Ox4, seems more withdrawn this shift and weaker than previous night, vitals have remained stable for pt/continues to be hypotensive, no new symptoms reported, uneventful shift; language line used throughout the shift to ensure that pt/spouse understood the POC and all questions answered; WBC/platelets continue to be critical with am lab results called in, no new orders; pt did not require prn insulin at bedtime,  is concerned about hypoglycemia and does not want additional insulin given unless blood glucose is greater than 200; call light in reach, bed alarm in use, will continue to monitor

## 2018-08-23 NOTE — PLAN OF CARE
Problem: Patient Care Overview  Goal: Plan of Care Review  Outcome: Ongoing (interventions implemented as appropriate)  Patient is listed for liver transplant. Spouse remains at bedside.  at bedside during shift. Isolation precautions maintained. Patient remains free of falls. Will continue to monitor.

## 2018-08-23 NOTE — PROGRESS NOTES
Admit Note     Met with , daughter and Hungarian  to assess patients needs due to patient finally sleeping soundly.  Patient is a 68 y.o.  female, admitted ESLD, listed for liver transplant yesterday 8/22/18 and transfered to LTS service.     Patient admitted from clinic/ed on 8/14/2018 .  At this time, patient presents as sleeping.  At this time, patients caregiver presents as alert and oriented x 4, pleasant, good eye contact, well groomed, average intelligence, calm, communicative, cooperative and asking and answering questions appropriately.      Household/Family Systems (as reported by patients caregiver)     Patient resides with patient's , at [address].  Support system includes  Andrés Ramirez whose primary language is Angolan, but does understand and speak some Hungarian.  Son Justin Ramirez who is a physician but back home in [place].   Patient's daughter Angela is present today from [place].  There are two other daughters home in [place], Terri and Gabriela who were supposed to take turns with Angela staying with patient and patient's .  Patient does not have dependents that are need of being cared for.     Patients primary caregiver is Andrés Ramirez, patients , phone number [phone], speaks Angolan primary and some Hungarian.  Patient's daughter Angela is here, speaks Hungarian,phone [phone]..  Confirmed patients contact information is [phone] (home);   Telephone Information:   Mobile [phone]   .    During admission, patient's caregiver plans to stay [facility] Room 674.  Confirmed patient and patients caregivers do have access to reliable transportation.    Cognitive Status/Learning     Patients caregiver reports patients reading ability as college and states patient does have difficulty with weakness and does not speak English.  .  Patients caregiver reports patient learns best by in Hungarian language verbal or  hands on.   Needed: Yes; primary language is  Belarusian.  Patient's caregiver informed of  services available and how to access services..   Highest education level: Associate/Bachelor Degree    Vocation/Disability (as reported by patients caregiver)    Working for Income: No  If no, reason not working: Patient Choice - Retired  Patient is retired from teaching school.    Adherence     Patients caregiver reports patient has a high level of adherence to patients health care regimen.  Adherence counseling and education provided.  Patient's caregiver verbalizes understanding.    Substance Use    Patients caregiver reports patients substance usage as the following:    Tobacco: none, patient denies any use.  Alcohol: none, patient denies any use.  Illicit Drugs/Non-prescribed Medications: none, patient denies any use.  Patients caregiver states clear understanding of the potential impact of substance use.  Substance abstinence/cessation counseling, education and resources provided and reviewed.     Services Utilizing/ADLS (as reported by patients caregiver)    Infusion Service: Prior to admission, patient utilizing? no  Home Health: Prior to admission, patient utilizing? no  DME: Prior to admission, walker and tai to borrow from International [employer]  Pulmonary/Cardiac Rehab: Prior to admission, no  Dialysis:  Prior to admission, no  Transplant Specialty Pharmacy:  Prior to admission, no.    Prior to admission, patients caregiver reports patient was somewhat independent with ADLS, requiring assistance and was not driving.  Patients caregiver reports patient is not able to care for self at this time due to compromised medical condition (as documented in medical record) and physical weakness..  Patients caregiver reports patient indicates a willingness to care for self once medically cleared to do so.    Insurance/Medications    Insured by   Payor/Plan Subscr  Sex Relation Sub. Ins. ID Effective  Group Num   1. [facility] NETW* MERCEDES PATHAK 1950 Female  [id number] 8/3/18                                    [address] [address] [id number]      Primary Insurance (for UNOS reporting): Private Insurance- CM is Diane Rodríguez RN  Secondary Insurance (for UNOS reporting): None    Patients caregiver reports patient is able to obtain and afford medications at this time and at time of discharge.    Living Will/Healthcare Power of     Patients caregiver reports patient does not have a LW and/or HCPA.   provided education regarding LW and HCPA and the completion of forms.    Coping/Mental Health (as reported by patients caregiver)    Patient is coping well with the aid of  family members, friends and blaine. Patient has no mental health concerns.  Patients caregiver is coping well with the aid of  family members and blaine. Daughter has some anxiousness due to not having very much knowledge of liver transplant here.  Psychosoical Education provided today.     Discharge Planning (as reported by patients caregiver)    At time of discharge, patient plans to return to [facility] under the care of  and daughter [place].  Patients  and daughter will transport patient.  Per rounds today, expected discharge date has not been medically determined at this time. Patients caretaker verbalizes understanding and is involved in treatment planning and discharge process.    Additional Concerns    Patient is being followed for needs, education, resources, information, emotional support, supportive counseling, and for supportive and skilled discharge plan of care.  providing ongoing psychosocial support, education, resources and d/c planning as needed.  SW remains available. Patient's caregiver verbalizes understanding and agreement with information reviewed,  availability and how to access available resources as needed. Caregiver, Angela with questions about what services  will be provided to patient inside and outside of the hospital.  There could be a potential caregiver concern, as when Angela leaves on 9/22/18, there may not be another family member available to travel to the [place] to assist the patient and .   SW will speak further about these concerns with Sukhdev in International Dept and the LTS rounding team if needed.

## 2018-08-24 ENCOUNTER — TELEPHONE (OUTPATIENT)
Dept: TRANSPLANT | Facility: CLINIC | Age: 68
End: 2018-08-24

## 2018-08-24 VITALS
HEIGHT: 65 IN | BODY MASS INDEX: 20.28 KG/M2 | TEMPERATURE: 98 F | HEART RATE: 79 BPM | RESPIRATION RATE: 18 BRPM | OXYGEN SATURATION: 96 % | WEIGHT: 121.69 LBS | SYSTOLIC BLOOD PRESSURE: 119 MMHG | DIASTOLIC BLOOD PRESSURE: 55 MMHG

## 2018-08-24 DIAGNOSIS — Z76.82 AWAITING ORGAN TRANSPLANT STATUS: ICD-10-CM

## 2018-08-24 DIAGNOSIS — B19.10 HEP B W/O COMA: Primary | ICD-10-CM

## 2018-08-24 DIAGNOSIS — Z76.82 ORGAN TRANSPLANT CANDIDATE: Primary | ICD-10-CM

## 2018-08-24 LAB
ALBUMIN SERPL BCP-MCNC: 3.5 G/DL
ALP SERPL-CCNC: 341 U/L
ALT SERPL W/O P-5'-P-CCNC: 111 U/L
ANION GAP SERPL CALC-SCNC: 8 MMOL/L
ANISOCYTOSIS BLD QL SMEAR: SLIGHT
AST SERPL-CCNC: 210 U/L
BACTERIA #/AREA URNS AUTO: ABNORMAL /HPF
BACTERIA SPEC AEROBE CULT: NO GROWTH
BASOPHILS # BLD AUTO: 0 K/UL
BASOPHILS NFR BLD: 0 %
BILIRUB SERPL-MCNC: 2 MG/DL
BILIRUB UR QL STRIP: NEGATIVE
BUN SERPL-MCNC: 41 MG/DL
CALCIUM SERPL-MCNC: 8 MG/DL
CHLORIDE SERPL-SCNC: 104 MMOL/L
CLARITY UR REFRACT.AUTO: ABNORMAL
CO2 SERPL-SCNC: 16 MMOL/L
COLOR UR AUTO: ABNORMAL
CREAT SERPL-MCNC: 1 MG/DL
DIFFERENTIAL METHOD: ABNORMAL
EOSINOPHIL # BLD AUTO: 0 K/UL
EOSINOPHIL NFR BLD: 0.9 %
ERYTHROCYTE [DISTWIDTH] IN BLOOD BY AUTOMATED COUNT: 19.3 %
EST. GFR  (AFRICAN AMERICAN): >60 ML/MIN/1.73 M^2
EST. GFR  (NON AFRICAN AMERICAN): 57.6 ML/MIN/1.73 M^2
GLUCOSE SERPL-MCNC: 208 MG/DL
GLUCOSE UR QL STRIP: NEGATIVE
HCT VFR BLD AUTO: 26.2 %
HGB BLD-MCNC: 8.7 G/DL
HGB UR QL STRIP: ABNORMAL
HYPOCHROMIA BLD QL SMEAR: ABNORMAL
IMM GRANULOCYTES # BLD AUTO: 0.01 K/UL
IMM GRANULOCYTES NFR BLD AUTO: 0.4 %
INR PPP: 1.2
KETONES UR QL STRIP: NEGATIVE
LEUKOCYTE ESTERASE UR QL STRIP: ABNORMAL
LYMPHOCYTES # BLD AUTO: 0.7 K/UL
LYMPHOCYTES NFR BLD: 30.5 %
MAGNESIUM SERPL-MCNC: 2.1 MG/DL
MCH RBC QN AUTO: 30.3 PG
MCHC RBC AUTO-ENTMCNC: 33.2 G/DL
MCV RBC AUTO: 91 FL
MICROSCOPIC COMMENT: ABNORMAL
MONOCYTES # BLD AUTO: 0.3 K/UL
MONOCYTES NFR BLD: 11.7 %
NEUTROPHILS # BLD AUTO: 1.3 K/UL
NEUTROPHILS NFR BLD: 56.5 %
NITRITE UR QL STRIP: NEGATIVE
NRBC BLD-RTO: 0 /100 WBC
OVALOCYTES BLD QL SMEAR: ABNORMAL
PH UR STRIP: 5 [PH] (ref 5–8)
PHOSPHATE SERPL-MCNC: 2.2 MG/DL
PLATELET # BLD AUTO: 32 K/UL
PLATELET BLD QL SMEAR: ABNORMAL
PMV BLD AUTO: 11.5 FL
POCT GLUCOSE: 223 MG/DL (ref 70–110)
POCT GLUCOSE: 237 MG/DL (ref 70–110)
POCT GLUCOSE: 282 MG/DL (ref 70–110)
POCT GLUCOSE: 310 MG/DL (ref 70–110)
POIKILOCYTOSIS BLD QL SMEAR: SLIGHT
POLYCHROMASIA BLD QL SMEAR: ABNORMAL
POTASSIUM SERPL-SCNC: 4.4 MMOL/L
PROCALCITONIN SERPL IA-MCNC: 0.52 NG/ML
PROT SERPL-MCNC: 4.7 G/DL
PROT UR QL STRIP: NEGATIVE
PROTHROMBIN TIME: 12 SEC
RBC # BLD AUTO: 2.87 M/UL
RBC #/AREA URNS AUTO: 9 /HPF (ref 0–4)
SCHISTOCYTES BLD QL SMEAR: ABNORMAL
SODIUM SERPL-SCNC: 128 MMOL/L
SP GR UR STRIP: 1.03 (ref 1–1.03)
SQUAMOUS #/AREA URNS AUTO: 5 /HPF
URN SPEC COLLECT METH UR: ABNORMAL
UROBILINOGEN UR STRIP-ACNC: NEGATIVE EU/DL
WBC # BLD AUTO: 2.23 K/UL
WBC #/AREA URNS AUTO: 4 /HPF (ref 0–5)
YEAST UR QL AUTO: ABNORMAL

## 2018-08-24 PROCEDURE — 36415 COLL VENOUS BLD VENIPUNCTURE: CPT | Mod: NTX

## 2018-08-24 PROCEDURE — 99239 HOSP IP/OBS DSCHRG MGMT >30: CPT | Mod: NTX,,, | Performed by: NURSE PRACTITIONER

## 2018-08-24 PROCEDURE — 25000003 PHARM REV CODE 250: Mod: NTX | Performed by: NURSE PRACTITIONER

## 2018-08-24 PROCEDURE — 84145 PROCALCITONIN (PCT): CPT | Mod: NTX

## 2018-08-24 PROCEDURE — 80053 COMPREHEN METABOLIC PANEL: CPT | Mod: NTX

## 2018-08-24 PROCEDURE — 25000003 PHARM REV CODE 250: Mod: NTX | Performed by: HOSPITALIST

## 2018-08-24 PROCEDURE — 94761 N-INVAS EAR/PLS OXIMETRY MLT: CPT | Mod: NTX

## 2018-08-24 PROCEDURE — 25000003 PHARM REV CODE 250: Mod: NTX | Performed by: PHYSICIAN ASSISTANT

## 2018-08-24 PROCEDURE — 84100 ASSAY OF PHOSPHORUS: CPT | Mod: NTX

## 2018-08-24 PROCEDURE — 85610 PROTHROMBIN TIME: CPT | Mod: NTX

## 2018-08-24 PROCEDURE — 25000242 PHARM REV CODE 250 ALT 637 W/ HCPCS: Mod: NTX | Performed by: PHYSICIAN ASSISTANT

## 2018-08-24 PROCEDURE — 63600175 PHARM REV CODE 636 W HCPCS: Mod: NTX | Performed by: PHYSICIAN ASSISTANT

## 2018-08-24 PROCEDURE — 94640 AIRWAY INHALATION TREATMENT: CPT | Mod: NTX

## 2018-08-24 PROCEDURE — 81001 URINALYSIS AUTO W/SCOPE: CPT | Mod: NTX

## 2018-08-24 PROCEDURE — 85025 COMPLETE CBC W/AUTO DIFF WBC: CPT | Mod: NTX

## 2018-08-24 PROCEDURE — 83735 ASSAY OF MAGNESIUM: CPT | Mod: NTX

## 2018-08-24 PROCEDURE — 63600175 PHARM REV CODE 636 W HCPCS: Mod: NTX | Performed by: HOSPITALIST

## 2018-08-24 RX ORDER — VANCOMYCIN HCL IN 5 % DEXTROSE 1G/250ML
1000 PLASTIC BAG, INJECTION (ML) INTRAVENOUS
Status: DISCONTINUED | OUTPATIENT
Start: 2018-08-24 | End: 2018-08-24

## 2018-08-24 RX ORDER — SODIUM BICARBONATE 650 MG/1
1300 TABLET ORAL 2 TIMES DAILY
Status: DISCONTINUED | OUTPATIENT
Start: 2018-08-24 | End: 2018-08-24 | Stop reason: HOSPADM

## 2018-08-24 RX ORDER — SODIUM BICARBONATE 650 MG/1
1300 TABLET ORAL 2 TIMES DAILY
Qty: 120 TABLET | Refills: 11 | Status: ON HOLD | COMMUNITY
Start: 2018-08-24 | End: 2018-09-21 | Stop reason: SDUPTHER

## 2018-08-24 RX ORDER — MIDODRINE HYDROCHLORIDE 5 MG/1
15 TABLET ORAL EVERY 8 HOURS
Qty: 270 TABLET | Refills: 5 | Status: ON HOLD | OUTPATIENT
Start: 2018-08-24 | End: 2018-12-20 | Stop reason: HOSPADM

## 2018-08-24 RX ORDER — INSULIN GLARGINE 100 [IU]/ML
24 INJECTION, SOLUTION SUBCUTANEOUS NIGHTLY
Qty: 15 ML | Refills: 3 | Status: ON HOLD | OUTPATIENT
Start: 2018-08-24 | End: 2018-08-31 | Stop reason: SDUPTHER

## 2018-08-24 RX ORDER — INSULIN ASPART 100 [IU]/ML
12 INJECTION, SOLUTION INTRAVENOUS; SUBCUTANEOUS
Qty: 15 ML | Refills: 3 | Status: ON HOLD | OUTPATIENT
Start: 2018-08-24 | End: 2018-08-31 | Stop reason: SDUPTHER

## 2018-08-24 RX ADMIN — INSULIN ASPART 3 UNITS: 100 INJECTION, SOLUTION INTRAVENOUS; SUBCUTANEOUS at 04:08

## 2018-08-24 RX ADMIN — INSULIN ASPART 12 UNITS: 100 INJECTION, SOLUTION INTRAVENOUS; SUBCUTANEOUS at 12:08

## 2018-08-24 RX ADMIN — SODIUM BICARBONATE 1300 MG: 650 TABLET ORAL at 09:08

## 2018-08-24 RX ADMIN — MIDODRINE HYDROCHLORIDE 15 MG: 5 TABLET ORAL at 04:08

## 2018-08-24 RX ADMIN — PIPERACILLIN AND TAZOBACTAM 4.5 G: 4; .5 INJECTION, POWDER, LYOPHILIZED, FOR SOLUTION INTRAVENOUS; PARENTERAL at 03:08

## 2018-08-24 RX ADMIN — MIDODRINE HYDROCHLORIDE 15 MG: 5 TABLET ORAL at 05:08

## 2018-08-24 RX ADMIN — IPRATROPIUM BROMIDE AND ALBUTEROL SULFATE 3 ML: .5; 3 SOLUTION RESPIRATORY (INHALATION) at 05:08

## 2018-08-24 RX ADMIN — INSULIN ASPART 4 UNITS: 100 INJECTION, SOLUTION INTRAVENOUS; SUBCUTANEOUS at 12:08

## 2018-08-24 RX ADMIN — INSULIN ASPART 2 UNITS: 100 INJECTION, SOLUTION INTRAVENOUS; SUBCUTANEOUS at 07:08

## 2018-08-24 RX ADMIN — INSULIN ASPART 12 UNITS: 100 INJECTION, SOLUTION INTRAVENOUS; SUBCUTANEOUS at 04:08

## 2018-08-24 RX ADMIN — ENTECAVIR 0.5 MG: 0.5 TABLET, FILM COATED ORAL at 09:08

## 2018-08-24 RX ADMIN — DIBASIC SODIUM PHOSPHATE, MONOBASIC POTASSIUM PHOSPHATE AND MONOBASIC SODIUM PHOSPHATE 2 TABLET: 852; 155; 130 TABLET ORAL at 09:08

## 2018-08-24 RX ADMIN — INSULIN ASPART 12 UNITS: 100 INJECTION, SOLUTION INTRAVENOUS; SUBCUTANEOUS at 07:08

## 2018-08-24 NOTE — PROGRESS NOTES
Discharge Medication Note:    Hospital Course:  Work up for liver transplant was completed.  Patient was listed for liver transplant 8/22/18 w MELD 18.  On admit, patient had blood and urine cx.  Blood cx w NGTD.  While awaiting cx results, she was treated empirically w Vanc and Cipro.   Cipro was changed to ertapenem on 8/20/18 per ID when cx resulted- Urine cx w (+) kleb pneumo and Enterococcus faecium.  Patient was treated w Cipro 8/16-8/20.  She was changed to Ertapenem 8/20 for 3 days.  Repeat urine cx w candida albicans <50,000- no need to treat.  Her ANC was low 8/23/18 at 739.  ID had changed IV abx to Zosyn for pseudomonas coverage.  Pro calcitonin is trending down.  Per ID, ok to d/c abx if pro calcitonin is trending down.  She received abx for 7 days. Heme/Onc was also consulted for pancytopenia felt likely drug related and signed off.  She is tolerating diet.  She denies abdominal pain.  Last paracentesis 8/21/18- 4700 ml removed, wbc 81/segs 2.  She will need weekly paracentesis.  Patient is alert and oriented.  HE well managed w Lactulose (titrate to 3-4 BMs) and Rifaximin.  Hypotension better controlled w Midodrine 15 mg tid.  She denies dizziness.  Dtr feels patient more tremulous.  Fine tremor noted on assessment.  Patient having 3-4 BMs every 24 hours.  No asterisks noted.  She is listed w MELD 18 thru 11/23/18.  Her Medical MELD day of discharge 11.      Met with Scarlett Reddy at discharge to review discharge medications and to update the blue medication card.       Scarlett Reddy   Home Medication Instructions CARLOS MANUEL:[id number]    Printed on:08/24/18 9677   Medication Information                      blood sugar diagnostic (ACCU-CHEK DC) Strp  1 strip by Misc.(Non-Drug; Combo Route) route 3 (three) times daily.             entecavir (BARACLUDE) 0.5 MG Tab  Take 0.5 mg by mouth once daily.             insulin aspart U-100 (NOVOLOG) 100 unit/mL injection  Inject 12 Units into the skin 3 (three)  times daily before meals. Plus low dose sliding scale.             insulin glargine (LANTUS) 100 unit/mL injection  Inject 24 Units into the skin every evening.             lactulose (CHRONULAC) 10 gram/15 mL solution  Take 30 mLs by mouth once daily.              midodrine (PROAMATINE) 5 MG Tab  Take 3 tablets (15 mg total) by mouth every 8 (eight) hours.             sodium bicarbonate 650 MG tablet  Take 2 tablets (1,300 mg total) by mouth 2 (two) times daily.                 Pt was provided with prescriptions for the following meds:  Midodrine and sodium bicarb.    The following medications have been placed on HOLD and should be restarted in the outpatient setting (when appropriate): None. Patient told to stop propanolol for low BP inpatient requiring midodrine.    Scarlett Reddy verbalized understanding and had the opportunity to ask questions.

## 2018-08-24 NOTE — TELEPHONE ENCOUNTER
Patient for discharge home today. She is waitlisted for liver transplant.  Met with patient, , daughter and Dunia the .  Reviewed concepts regarding transplant, diet, what will happen when patient is called for transplant, importance of exercise, taking medications.  Reviewed plan for follow up :  Labs twice weekly, clinic appts weekly, paracentesis weekly, and seeing coordinator weekly. Allowed time for questions.  Time spent with family teachin minutes.

## 2018-08-24 NOTE — NURSING
Pt packed up and ready for discharge, would like to eat before leaving to the [facility].Will be wheeled out by family member in when finished with dinner.

## 2018-08-24 NOTE — PROGRESS NOTES
Discharge Note:    TIMBO met with pt, pts , pts dgtr Angela and Mohawk  Zacarias from International Dept at bedside to continue to provide psychosocial education and discuss dc plans.   Pt presents in bedside chair, alert and oriented x 4 but quiet, has good eye contact, but letting her dgtr Angela do most of the communication today.   Pt's dgtr not in agreement with dc plans for today as she feels her mother is to weak.   S rounding team met with patient today and [facility] under the care of her daughter Angela ([phone]) and  Andrés Ramirez. ([phone]).   Per Dr. Sprague on S rounding team, patient does not qualify for home health services today.   He wants patient up and out of hotel room walking around.   SW assisted the patient and International Dept with patient getting Kosher meals, but speaking with pts bedside nurse Mc ([phone]) this am, who reports she can make those changes to patient's dietary needs.    Patient's daughter asking again about paid caregivers, SW will speak further with Katerin in International Dept, as patient's son indicated to her before he left to return to [facility] that there will almost always be a family member with pt and pts  here locally.   TIMBO will provide sitter service resource list/book to International Dept to provide to patient and pts family.   Pt concerned about being week, quiet, not really answering SW about mood or coping.    No psychosocial needs identified for discharge.  SW Remains available for all transplant resources, education and psychosocial support.

## 2018-08-24 NOTE — DISCHARGE SUMMARY
[facility]  Liver Transplant  Discharge Summary      Patient Name: Scarlett Reddy  MRN: [id number]  Admission Date: 8/14/2018  Hospital Length of Stay: 10 days  Discharge Date and Time:  08/24/2018 10:57 AM  Attending Physician: Gigi Sprague MD   Discharging Provider: Meghna Maldonado NP  Primary Care Provider: Primary Doctor No    HPI:   HPI:  Scarlett Reddy is a 67 y/o female with past medical history of Hep B and D cirrhosis, on Entecavir with undetectable Hep B DNA, c/b EV with bleeding, HE, previous H/O PV thrombosis, and refractory ascites, T2DM, who presented as a transfer from [facility] for evaluation for liver transplant surgery. Per records, the patient had been declining recently with hyponatremia due to diuretics (Na of 121), that improved to 129 with holding diuretics.  She is now requiring paracentesis every 7-10 days. She presented to a hospital in [facility] for progressive weakness, found to have worsening hyponatremia. Per son report, the patient declined rapidly at the end of the trip to [place] via airplane, and had to be transported from airport to ER via EMS.  On admit, patient was somnolent and improved w lactulose enema.      Patient since admission feels well .She states that she does not remember the details of her trip. She states that she had a paracentesis just before flying to [place]. She denies f/c, denies N/V, deines abdominal pain. Endorses abdominal swelling. Denies melena or hematemesis.    Patient was also on Lovenox for suspected portal vein thrombosis in [facility], of note PV patent on all studies since admit.  The patient notes she was active and independent prior. She denies alcohol use. Labs on presentation notable for PLT of 85, Na 124, Cr 1.4, ALKP 658, Tbili 2.1, AST//160     Blood Group : A POS      Hospital Course:    Work up for liver transplant was completed.  Patient was listed for liver transplant 8/22/18 w MELD 18.  On admit, patient had blood  and urine cx.  Blood cx w NGTD.  While awaiting cx results, she was treated empirically w Vanc and Cipro.   Cipro was changed to ertapenem on 8/20/18 per ID when cx resulted- Urine cx w (+) kleb pneumo and Enterococcus faecium.  Patient was treated w Cipro 8/16-8/20.  She was changed to Ertapenem 8/20 for 3 days.  Repeat urine cx w candida albicans <50,000- no need to treat.  Her ANC was low 8/23/18 at 739.  ID had changed IV abx to Zosyn for pseudomonas coverage.  Pro calcitonin is trending down.  Per ID, ok to d/c abx if pro calcitonin is trending down.  She received abx for 7 days.  Repeat UA to be followed up on.  Heme/Onc was also consulted for pancytopenia felt likely drug related and signed off.  She is tolerating diet.  She denies abdominal pain.  Last paracentesis 8/21/18- 4700 ml removed, wbc 81/segs 2.  She will need weekly paracentesis.  Patient is alert and oriented.  HE well managed w Lactulose (titrate to 3-4 BMs) and Rifaximin.  Hypotension better controlled w Midodrine 15 mg tid.  She denies dizziness.  Dtr feels patient more tremulous.  Fine tremor noted on assessment.  Patient having 3-4 BMs every 24 hours.  No asterisks noted.  She is listed w MELD 18 thru 11/23/18.  Her Medical MELD day of discharge 11.      Discharge instructions reviewed by Pharmacist, Nursing and Transplant coordinator- Lizzeth Vidales.  Patient and CG verbalize understanding and are in agreement with discharge from hospital today.  Patient is medically stable for discharge.  Patient will be discharged w PDOL per protocol.  She will be seen next week by nurse practitioner.  She will have weekly paracentesis.       MELD-Na score: 11 at 8/24/2018  3:59 AM  MELD score: 11 at 8/24/2018  3:59 AM  Calculated from:  Serum Creatinine: 1 mg/dL at 8/24/2018  3:59 AM  Serum Sodium: 128 mmol/L at 8/24/2018  3:59 AM  Total Bilirubin: 2 mg/dL at 8/24/2018  3:59 AM  INR(ratio): 1.2 at 8/24/2018  3:59 AM  Age: 68 years    Final Active  Diagnoses:    Diagnosis Date Noted POA    PRINCIPAL PROBLEM:  Pre-transplant evaluation for chronic liver disease [Z01.818] 08/19/2018 Not Applicable    Hyponatremia [E87.1]  Unknown    Hypotension [I95.9]  Yes    Neutropenia [D70.9]  Unknown    Severe malnutrition [E43] 08/16/2018 Yes    Anemia requiring transfusions [D64.9] 08/16/2018 No    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 [B18.0, K74.60] 08/15/2018 Yes    Type 2 diabetes mellitus, with long-term current use of insulin [E11.9, Z79.4] 08/15/2018 Not Applicable    Esophageal varices without bleeding [I85.00] 08/15/2018 Yes    Liver transplant candidate [Z76.82]  Not Applicable    Hepatic encephalopathy [K72.90] 08/14/2018 Yes      Problems Resolved During this Admission:       Consults (From admission, onward)        Status Ordering Provider     Inpatient consult to Gynecology  Once     Provider:  (Not yet assigned)    Completed NAJUL-EMMANUEL, DANUTA     Inpatient consult to Hematology/Oncology  Once     Provider:  (Not yet assigned)    Completed NAJUL-EMMANUEL, DANUTA     Inpatient consult to Hepatology  Once     Provider:  (Not yet assigned)    Completed NAJUL-EMMANUEL, DANUTA     Inpatient consult to Infectious Diseases  Once     Provider:  (Not yet assigned)    Completed NAJUL-EMMANUEL, DANUTA     Inpatient consult to Infectious Diseases  Once     Provider:  (Not yet assigned)    Completed NAJUL-EMMANUEL, DANUTA     Inpatient consult to Liver Transplant  Once     Provider:  (Not yet assigned)    KAREN Pelletier     Inpatient consult to Registered Dietitian/Nutritionist  Once     Provider:  (Not yet assigned)    Completed NAJUL-EMMANUEL, DANUTA     Inpatient consult to Registered Dietitian/Nutritionist  Once     Provider:  (Not yet assigned)    Completed NAJUL-EMMANUEL, DANUTA     Inpatient consult to Registered Dietitian/Nutritionist  Once     Provider:  (Not yet assigned)    Completed NAJUL-EMMANUEL, DANUTA          Pending Diagnostic Studies:     Procedure  Component Value Units Date/Time    Hepatitis B Viral DNA by PCR, Qualitative [[id number]] Collected:  08/22/18 0532    Order Status:  Sent Lab Status:  In process Updated:  08/22/18 0601    Specimen:  Blood     IR Paracentesis with Imaging [[id number]] Resulted:  08/21/18 1329    Order Status:  Sent Lab Status:  In process Updated:  08/21/18 1449        Significant Diagnostic Studies: Labs:   CMP   Recent Labs   Lab  08/23/18   0351  08/24/18   0359   NA  130*  128*   K  4.2  4.4   CL  105  104   CO2  16*  16*   GLU  125*  208*   BUN  41*  41*   CREATININE  0.9  1.0   CALCIUM  8.1*  8.0*   PROT  5.0*  4.7*   ALBUMIN  4.0  3.5   BILITOT  2.4*  2.0*   ALKPHOS  318*  341*   AST  187*  210*   ALT  95*  111*   ANIONGAP  9  8   ESTGFRAFRICA  >60.0  >60.0   EGFRNONAA  >60.0  57.6*   CBC   Recent Labs   Lab  08/22/18   1619  08/23/18   0351  08/24/18   0400   WBC  1.42*  1.60*  2.23*   HGB  8.6*  8.5*  8.7*   HCT  25.9*  25.9*  26.2*   PLT  35*  31*  32*   INR   Lab Results   Component Value Date    INR 1.2 08/24/2018    INR 1.2 08/23/2018    INR 1.2 08/22/2018     All labs within the past 24 hours have been reviewed    Microbiology:   Blood Culture   Lab Results   Component Value Date    LABBLOO No growth after 5 days. 08/17/2018    LABBLOO No growth after 5 days. 08/17/2018    and Urine Culture    Lab Results   Component Value Date    LABURIN  08/20/2018     KEVIN ALBICANS  10,000 - 49,999 cfu/ml  Treatment of asymptomatic candiduria is not recommended (except for   specific populations). Candida isolated in the urine typically   represents colonization. If an indwelling urinary catheter is present  it should be removed or replaced.       Radiology: X-Ray: CXR: X-Ray Chest 1 View (CXR):   Results for orders placed or performed during the hospital encounter of 08/14/18   X-Ray Chest 1 View    Narrative    EXAMINATION:  XR CHEST 1 VIEW    CLINICAL HISTORY:  r/o PNA;    TECHNIQUE:  Single frontal view of the chest was  performed.    COMPARISON:  The x-ray chest AP portable 08/14/2018    FINDINGS:  The cardiac silhouette is normal in size. The hilar and mediastinal contours are unremarkable.  Normal appearance of pulmonary vasculature with a mild left-sided pleural effusion with associated mild left lower lobe consolidation or atelectasis.  No evidence of pneumothorax or pneumomediastinum.    The osseous structures are unremarkable.      Impression    Interval development of mild left-sided pleural effusion with associated mild left lower lobar consolidation/atelectasis.    Electronically signed by resident: Reza Wright MD  Date:    08/21/2018  Time:    10:44    Electronically signed by: Cristobal Schmidt MD  Date:    08/21/2018  Time:    11:15     CT scan: CT ABDOMEN PELVIS WITHOUT CONTRAST: No results found for this visit on 08/14/18.  Ultrasound:   The liver is normal in size measuring 12.4 cm.  The liver demonstrates nodular contour and heterogeneous echotexture.  No focal hepatic lesions are seen.    The gallbladder demonstrates a 1.0 cm mobile gallstone in the gallbladder neck, multiple punctate hyper echogenic foci in the gallbladder wall suggestive of small adherent stones or adenomyomatosis, and a 0.7 cm focal isoechoic region near the fundus which may represent adherent sludge or polyp.  The common duct is not dilated, measuring 3 mm.  The gallbladder wall is not thickened.  There is no sonographic Burgess sign.  No dilated intrahepatic radicles are seen.    The spleen is enlarged measuring 12.9 x 4.8 cm with a homogeneous echotexture.    The aorta tapers normally.    The kidneys are normal in size without focal abnormality or evidence of hydronephrosis.    There is moderate volume ascites.    The main portal vein, right portal vein, left portal vein, middle hepatic vein, right hepatic vein, left hepatic vein, SMV, and IVC are patent with proper directional flow.  The main hepatic artery is patent with normal waveform.  The  umbilical vein is not patent.  The Celiac artery is normal on expiration with a velocity of 178 cm/sec.      Impression       Satisfactory Doppler ultrasound of the liver.    Cirrhotic liver with evidence of portal hypertension including splenomegaly and ascites.    Cholelithiasis, possible adenomyomatosis, and adherent sludge versus gallbladder polyp.  Recommend continued ultrasound surveillance for possible polyp.         The patients clinical status was discussed at multidisplinary rounds, involving transplant surgery, transplant medicine, pharmacy, nursing, nutrition, and social work    Discharged Condition: fair    Disposition: To [facility] accompanied by family    Follow Up: PDOL per protocol, f/u next week w NP, weekly paracentesis    Patient Instructions:   No discharge procedures on file.  Medications:  Reconciled Home Medications:      Medication List      ASK your doctor about these medications    ACCU-CHEK DC Strp  Generic drug:  blood sugar diagnostic  1 strip by Misc.(Non-Drug; Combo Route) route 3 (three) times daily.     enoxaparin 40 mg/0.4 mL Syrg  Commonly known as:  LOVENOX  Inject 40 mg into the skin every evening.     entecavir 0.5 MG Tab  Commonly known as:  BARACLUDE  Take 0.5 mg by mouth once daily.     insulin aspart U-100 100 unit/mL injection  Commonly known as:  NOVOLOG  Inject into the skin 3 (three) times daily before meals. 10-17 units sliding scale with meals     insulin glargine 100 unit/mL injection  Commonly known as:  LANTUS  Inject 24 Units into the skin every evening.     lactulose 10 gram/15 mL solution  Commonly known as:  CHRONULAC  Take 30 mLs by mouth once daily.     * propranolol 60 MG tablet  Commonly known as:  INDERAL  Take 60 mg by mouth once daily.     * propranolol 10 MG tablet  Commonly known as:  INDERAL  Take 10 mg by mouth once daily.         * This list has 2 medication(s) that are the same as other medications prescribed for you. Read the directions  carefully, and ask your doctor or other care provider to review them with you.              Time spent caring for patient (Greater than 1/2 spent in direct face-to-face contact): > 30 minutes    Meghna Maldonado NP  Liver Transplant  [facility]

## 2018-08-24 NOTE — PLAN OF CARE
Problem: Diabetes, Type 2 (Adult)  Goal: Signs and Symptoms of Listed Potential Problems Will be Absent, Minimized or Managed (Diabetes, Type 2)  Signs and symptoms of listed potential problems will be absent, minimized or managed by discharge/transition of care (reference Diabetes, Type 2 (Adult) CPG).  Outcome: Ongoing (interventions implemented as appropriate)  Provided additional education to pt/daughter regarding diabetes specifically causes of hyperglycemia including stress, infection and pain

## 2018-08-24 NOTE — PLAN OF CARE
"Problem: Patient Care Overview  Goal: Plan of Care Review  Outcome: Ongoing (interventions implemented as appropriate)  Pt A&Ox4, vitals have remained stable still hypotensive during this shift, cardiac monitoring in place with no changes noted to rate or rhythm; pt received prn breathing treatments x2 this shift and indicated that she "felt better" after receiving treatment, able to sleep; daughter present at bedside and able to assist with interpreting this shift, declined call to language line tonight; no complications with IV site and IV abt running at shift change; call light in reach, bed in low position, will continue to monitor      "

## 2018-08-24 NOTE — NURSING
Discharge packet reviewed with patient, , daughter, and Carolina . Medications reviewed, all questions answered. IV removed, patient transported to [facility] via personal wheelchair with , daughter, and all personal belongings.

## 2018-08-24 NOTE — PROGRESS NOTES
Hematology Brief Follow-Up Note     IMPRESSION  1) Neutropenia, improving. Today ANC is 1300. Ertapenem known to cause neutropenia, and was discontinued on 8/22.   2) Pancytopenia, likely multifactorial from hypersplenism due to hepatitis/end-stage liver disease, anemia of chronic inflammation, in setting of sepsis, medications (zosyn and vancomycin are myelosuppressive).  HIV was negative. Today WBC 2.2, Hb 8.7, plt 32. No evidence of B12/folate deficiency. Haptoglobin and fibrinogen may be low related to liver disease.  3) ESBL UTI  4) Decompensated liver cirrhosis in setting of hepatitis B and D infection. Patient is listed for liver transplant.  5) History of portal vein thrombosis  6) Left-sided pleural effusion     RECOMMENDATIONS  -Daily CBC  -Transfuse for Hb<7, plt<10 or bleeding     The following was staffed and discussed with supervising physician Dr. Rodriguez.    We will sign off. Please call [employer] number [phone] between 8AM-5PM with questions, otherwise page the Consult fellow on call.     Kelli Garcia MD  Hematology/Oncology Fellow

## 2018-08-24 NOTE — SUBJECTIVE & OBJECTIVE
Interval History: pt feels better overall.    Review of Systems   Constitutional: Positive for activity change and fatigue. Negative for appetite change, chills, diaphoresis and fever.   HENT: Negative for congestion, dental problem, sinus pressure, sinus pain, sneezing, sore throat and trouble swallowing.    Eyes: Negative.    Respiratory: Negative for cough, shortness of breath and wheezing.    Cardiovascular: Negative for chest pain, palpitations and leg swelling.   Gastrointestinal: Positive for abdominal distention. Negative for abdominal pain, diarrhea, nausea and vomiting.   Genitourinary: Negative for difficulty urinating, dysuria, flank pain, frequency, hematuria and urgency.   Musculoskeletal: Negative for arthralgias, back pain and neck pain.   Skin: Negative for color change and wound.   Neurological: Positive for weakness. Negative for dizziness.   Hematological: Negative for adenopathy. Bruises/bleeds easily.   Psychiatric/Behavioral: Negative for decreased concentration. The patient is not nervous/anxious.      Objective:     Vital Signs (Most Recent):  Temp: 98.2 °F (36.8 °C) (08/24/18 0741)  Pulse: 83 (08/24/18 0741)  Resp: 18 (08/24/18 0741)  BP: (!) 119/55 (08/24/18 0741)  SpO2: 96 % (08/24/18 0741) Vital Signs (24h Range):  Temp:  [97.8 °F (36.6 °C)-98.9 °F (37.2 °C)] 98.2 °F (36.8 °C)  Pulse:  [81-93] 83  Resp:  [16-18] 18  SpO2:  [93 %-100 %] 96 %  BP: ()/(47-55) 119/55     Weight: 55.2 kg (121 lb 11.1 oz)  Body mass index is 20.25 kg/m².    Estimated Creatinine Clearance: 46.3 mL/min (based on SCr of 1 mg/dL).    Physical Exam   Constitutional: She is oriented to person, place, and time. She appears well-developed. No distress.   Chronically ill appearing  (+) hand or temporal muscle wasting noted     HENT:   Head: Normocephalic.   Mouth/Throat: She does not have dentures. No oral lesions. Normal dentition. No oropharyngeal exudate.   Eyes: Conjunctivae are normal. No scleral icterus.    Neck: Normal range of motion. Neck supple.   Cardiovascular: Normal rate, regular rhythm and normal heart sounds. Exam reveals no gallop and no friction rub.   No murmur heard.  Pulmonary/Chest: Effort normal. No respiratory distress.   Abdominal: She exhibits distension. There is no tenderness. There is no guarding.   Firm     Musculoskeletal: She exhibits no edema.   Neurological: She is alert and oriented to person, place, and time.   Skin: Skin is warm and dry. She is not diaphoretic.   Psychiatric: She has a normal mood and affect. Her behavior is normal.   Vitals reviewed.      Significant Labs:   Blood Culture:   Recent Labs   Lab  08/14/18   1149  08/16/18   0214  08/16/18   0221  08/17/18   2236   LABBLOO  No growth after 5 days.  No growth after 5 days.  No growth after 5 days.  No growth after 5 days.  No growth after 5 days.  No growth after 5 days.     CBC:   Recent Labs   Lab  08/22/18   1619  08/23/18   0351  08/24/18   0400   WBC  1.42*  1.60*  2.23*   HGB  8.6*  8.5*  8.7*   HCT  25.9*  25.9*  26.2*   PLT  35*  31*  32*     CMP:   Recent Labs   Lab  08/23/18   0351  08/24/18   0359   NA  130*  128*   K  4.2  4.4   CL  105  104   CO2  16*  16*   GLU  125*  208*   BUN  41*  41*   CREATININE  0.9  1.0   CALCIUM  8.1*  8.0*   PROT  5.0*  4.7*   ALBUMIN  4.0  3.5   BILITOT  2.4*  2.0*   ALKPHOS  318*  341*   AST  187*  210*   ALT  95*  111*   ANIONGAP  9  8   EGFRNONAA  >60.0  57.6*     Urine Culture:   Recent Labs   Lab  08/16/18   0548  08/20/18   1233   LABURIN  KLEBSIELLA PNEUMONIAE ESBL  >100,000 cfu/ml    ENTEROCOCCUS FAECIUM  > 100,000 cfu/ml    CANDIDA ALBICANS  10,000 - 49,999 cfu/ml  Treatment of asymptomatic candiduria is not recommended (except for   specific populations). Candida isolated in the urine typically   represents colonization. If an indwelling urinary catheter is present  it should be removed or replaced.       Urine Studies:   Recent Labs   Lab  08/17/18   2315   COLORU   Stacy   APPEARANCEUA  Clear   PHUR  6.0   SPECGRAV  1.020   PROTEINUA  Negative   GLUCUA  1+*   KETONESU  Negative   BILIRUBINUA  Negative   OCCULTUA  Negative   NITRITE  Negative   UROBILINOGEN  2.0   LEUKOCYTESUR  2+*   RBCUA  1   WBCUA  30*   BACTERIA  Few*   SQUAMEPITHEL  5     Wound Culture:   Recent Labs   Lab  08/15/18   1412  08/21/18   1406   LABAERO  No growth  No growth       Significant Imaging: I have reviewed all pertinent imaging results/findings within the past 24 hours.

## 2018-08-25 ENCOUNTER — NURSE TRIAGE (OUTPATIENT)
Dept: ADMINISTRATIVE | Facility: CLINIC | Age: 68
End: 2018-08-25

## 2018-08-25 ENCOUNTER — TELEPHONE (OUTPATIENT)
Dept: TRANSPLANT | Facility: CLINIC | Age: 68
End: 2018-08-25

## 2018-08-26 LAB — HBV DNA SERPL QL NAA+PROBE: DETECTED

## 2018-08-26 NOTE — TELEPHONE ENCOUNTER
Reason for Disposition   Patient sounds very sick or weak to the triager    Protocols used: ST POST-OP SYMPTOMS AND [id number]    Patient's daughter called with concerns that patient's abdomen is swollen at drain site (parecentesis). Ms. Garcia asked for the assistance of a Tajik .  [id number] took the call. Explained to Ms. Garcia that if she is concerned about the drainage site, she should bring her mother to the ED. Ms. Garcia states that she was told that the mother would have a nurse to come by and take vital signs. Ms. Garcia requested that Dr. Sprauge be contacted for medical advice. Called Dr. Sprague and reviewed current d/c notes. Dr. Sprague wants patient to go to the ED or the family can call to get an appt on Monday. Called  again to translate to Ms. Garcia. Ms. Garcia was informed of Dr. Sprague's disposition and she verbalized understanding. Ms. Garcia will get a wheelchair and bring her mother to the main [facility] ED. Called [facility] ED and spoke with the charge nurse Rajiv to alert the triage area that the family was coming to the ED soon and that they will need a Tajik  to assist. Rajiv agreed to alert the triage nurses.

## 2018-08-27 ENCOUNTER — OFFICE VISIT (OUTPATIENT)
Dept: TRANSPLANT | Facility: CLINIC | Age: 68
End: 2018-08-27
Payer: COMMERCIAL

## 2018-08-27 ENCOUNTER — TELEPHONE (OUTPATIENT)
Dept: TRANSPLANT | Facility: CLINIC | Age: 68
End: 2018-08-27

## 2018-08-27 ENCOUNTER — LAB VISIT (OUTPATIENT)
Dept: LAB | Facility: HOSPITAL | Age: 68
End: 2018-08-27
Attending: INTERNAL MEDICINE
Payer: COMMERCIAL

## 2018-08-27 ENCOUNTER — TELEPHONE (OUTPATIENT)
Dept: HEPATOLOGY | Facility: CLINIC | Age: 68
End: 2018-08-27

## 2018-08-27 VITALS
OXYGEN SATURATION: 97 % | BODY MASS INDEX: 22.5 KG/M2 | SYSTOLIC BLOOD PRESSURE: 96 MMHG | DIASTOLIC BLOOD PRESSURE: 59 MMHG | WEIGHT: 131.81 LBS | RESPIRATION RATE: 16 BRPM | HEIGHT: 64 IN | TEMPERATURE: 98 F | HEART RATE: 91 BPM

## 2018-08-27 DIAGNOSIS — K76.82 HEPATIC ENCEPHALOPATHY: ICD-10-CM

## 2018-08-27 DIAGNOSIS — Z76.82 AWAITING ORGAN TRANSPLANT STATUS: ICD-10-CM

## 2018-08-27 DIAGNOSIS — Z76.82 ORGAN TRANSPLANT CANDIDATE: ICD-10-CM

## 2018-08-27 DIAGNOSIS — D70.2 OTHER DRUG-INDUCED NEUTROPENIA: ICD-10-CM

## 2018-08-27 DIAGNOSIS — K74.60 CHRONIC HEPATITIS B WITH DELTA AGENT WITH CIRRHOSIS: Primary | ICD-10-CM

## 2018-08-27 DIAGNOSIS — B18.0 CHRONIC HEPATITIS B WITH DELTA AGENT WITH CIRRHOSIS: Primary | ICD-10-CM

## 2018-08-27 DIAGNOSIS — Z87.440 HISTORY OF URINARY INFECTION: ICD-10-CM

## 2018-08-27 DIAGNOSIS — I95.9 HYPOTENSION, UNSPECIFIED HYPOTENSION TYPE: ICD-10-CM

## 2018-08-27 DIAGNOSIS — E87.1 HYPONATREMIA: ICD-10-CM

## 2018-08-27 DIAGNOSIS — Z76.82 ORGAN TRANSPLANT CANDIDATE: Primary | ICD-10-CM

## 2018-08-27 DIAGNOSIS — B19.10 HEP B W/O COMA: ICD-10-CM

## 2018-08-27 DIAGNOSIS — E11.9 TYPE 2 DIABETES MELLITUS WITHOUT COMPLICATION, WITH LONG-TERM CURRENT USE OF INSULIN: ICD-10-CM

## 2018-08-27 DIAGNOSIS — I85.10 SECONDARY ESOPHAGEAL VARICES WITHOUT BLEEDING: ICD-10-CM

## 2018-08-27 DIAGNOSIS — E83.42 HYPOMAGNESEMIA: ICD-10-CM

## 2018-08-27 DIAGNOSIS — Z79.4 TYPE 2 DIABETES MELLITUS WITHOUT COMPLICATION, WITH LONG-TERM CURRENT USE OF INSULIN: ICD-10-CM

## 2018-08-27 DIAGNOSIS — Z76.82 LIVER TRANSPLANT CANDIDATE: ICD-10-CM

## 2018-08-27 PROBLEM — Z01.818 PRE-TRANSPLANT EVALUATION FOR LIVER TRANSPLANT: Status: RESOLVED | Noted: 2018-08-19 | Resolved: 2018-08-27

## 2018-08-27 LAB
ALBUMIN SERPL BCP-MCNC: 3.7 G/DL
ALP SERPL-CCNC: 409 U/L
ALT SERPL W/O P-5'-P-CCNC: 88 U/L
ANION GAP SERPL CALC-SCNC: 11 MMOL/L
AST SERPL-CCNC: 122 U/L
BACTERIA #/AREA URNS AUTO: NORMAL /HPF
BASOPHILS # BLD AUTO: 0.02 K/UL
BASOPHILS NFR BLD: 0.6 %
BILIRUB SERPL-MCNC: 2.4 MG/DL
BILIRUB UR QL STRIP: NEGATIVE
BUN SERPL-MCNC: 50 MG/DL
CALCIUM SERPL-MCNC: 8.6 MG/DL
CHLORIDE SERPL-SCNC: 99 MMOL/L
CLARITY UR REFRACT.AUTO: ABNORMAL
CO2 SERPL-SCNC: 20 MMOL/L
COLOR UR AUTO: YELLOW
CREAT SERPL-MCNC: 1.1 MG/DL
DIFFERENTIAL METHOD: ABNORMAL
EOSINOPHIL # BLD AUTO: 0.1 K/UL
EOSINOPHIL NFR BLD: 2 %
ERYTHROCYTE [DISTWIDTH] IN BLOOD BY AUTOMATED COUNT: 19.1 %
EST. GFR  (AFRICAN AMERICAN): 59.2 ML/MIN/1.73 M^2
EST. GFR  (NON AFRICAN AMERICAN): 51.3 ML/MIN/1.73 M^2
GLUCOSE SERPL-MCNC: 204 MG/DL
GLUCOSE UR QL STRIP: NEGATIVE
HCT VFR BLD AUTO: 29.6 %
HGB BLD-MCNC: 10 G/DL
HGB UR QL STRIP: NEGATIVE
HYALINE CASTS UR QL AUTO: 1 /LPF
IMM GRANULOCYTES # BLD AUTO: 0.03 K/UL
IMM GRANULOCYTES NFR BLD AUTO: 0.9 %
INR PPP: 1.1
KETONES UR QL STRIP: NEGATIVE
LEUKOCYTE ESTERASE UR QL STRIP: NEGATIVE
LYMPHOCYTES # BLD AUTO: 0.7 K/UL
LYMPHOCYTES NFR BLD: 19.5 %
MCH RBC QN AUTO: 30.9 PG
MCHC RBC AUTO-ENTMCNC: 33.8 G/DL
MCV RBC AUTO: 91 FL
MICROSCOPIC COMMENT: NORMAL
MONOCYTES # BLD AUTO: 0.5 K/UL
MONOCYTES NFR BLD: 14.3 %
NEUTROPHILS # BLD AUTO: 2.2 K/UL
NEUTROPHILS NFR BLD: 62.7 %
NITRITE UR QL STRIP: NEGATIVE
NRBC BLD-RTO: 0 /100 WBC
PH UR STRIP: 6 [PH] (ref 5–8)
PLATELET # BLD AUTO: 59 K/UL
PMV BLD AUTO: 12.8 FL
POTASSIUM SERPL-SCNC: 3.9 MMOL/L
PROT SERPL-MCNC: 5.8 G/DL
PROT UR QL STRIP: NEGATIVE
PROTHROMBIN TIME: 11.2 SEC
RBC # BLD AUTO: 3.24 M/UL
RBC #/AREA URNS AUTO: 1 /HPF (ref 0–4)
SODIUM SERPL-SCNC: 130 MMOL/L
SP GR UR STRIP: 1.01 (ref 1–1.03)
SQUAMOUS #/AREA URNS AUTO: 0 /HPF
URN SPEC COLLECT METH UR: ABNORMAL
UROBILINOGEN UR STRIP-ACNC: NEGATIVE EU/DL
WBC # BLD AUTO: 3.43 K/UL
WBC #/AREA URNS AUTO: 1 /HPF (ref 0–5)

## 2018-08-27 PROCEDURE — 87086 URINE CULTURE/COLONY COUNT: CPT | Mod: NTX

## 2018-08-27 PROCEDURE — 99205 OFFICE O/P NEW HI 60 MIN: CPT | Mod: S$GLB,,, | Performed by: NURSE PRACTITIONER

## 2018-08-27 PROCEDURE — 85610 PROTHROMBIN TIME: CPT | Mod: TXP

## 2018-08-27 PROCEDURE — 36415 COLL VENOUS BLD VENIPUNCTURE: CPT | Mod: TXP

## 2018-08-27 PROCEDURE — 99999 PR PBB SHADOW E&M-EST. PATIENT-LVL V: CPT | Mod: PBBFAC,TXP,, | Performed by: NURSE PRACTITIONER

## 2018-08-27 PROCEDURE — 81001 URINALYSIS AUTO W/SCOPE: CPT | Mod: TXP

## 2018-08-27 PROCEDURE — 85025 COMPLETE CBC W/AUTO DIFF WBC: CPT | Mod: TXP

## 2018-08-27 PROCEDURE — 80053 COMPREHEN METABOLIC PANEL: CPT | Mod: TXP

## 2018-08-27 RX ORDER — LACTULOSE 10 G/15ML
15 SOLUTION ORAL; RECTAL 2 TIMES DAILY
Qty: 4000 ML | Refills: 3 | Status: SHIPPED | OUTPATIENT
Start: 2018-08-27 | End: 2018-09-04

## 2018-08-27 NOTE — TELEPHONE ENCOUNTER
Met with Patient's daughter and answered questions about concerns regarding paracentesis and providers, local anesthetics, diet, follow up appts. Meld scores, Home health, blood pressure cuffs, medications. Pain meds and why none where prescribed. Patient was just seen by NP earlier today.

## 2018-08-27 NOTE — PROGRESS NOTES
"[facility] VISIT NEW PT NOTE (previously seen in hospital by hepatology , new to transplant clinic)    CHIEF COMPLAINT: decompensated cirrhosis 2/2 Hep B and D, hospital f/u     HPI: This is a 68 y.o. 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  Noted h/o variceal bleeding in past notes, small varices noted on recent EGD    Also h/o PVT on Enoxaparin, stopped while hospitalized, per hospital med note on 8/21/18, - hold lovenox due to thrombocytopenia but may resume if plts improve    Last paracentesis was last Tuesday 8/21. Reports pain at Lidocaine site after paracentesis, now with LUQ and LLQ pain "since paracentesis", intermittently, improves after BM but returns within a couple of hours. Pt denies any change in abd pain since hospital discharge. Denies vomiting. Reports intermittent nausea and + decreased appetite      Cough continues, mainly dry cough but reports productive intermittently, last chest Xray 8/21 and CT chest 8/23/18    Patient is currently on the liver transplant waitlist as of 8/22/18, listed with MELD 18 since 8/23/18  MELD-Na score: 18 at 8/27/2018  8:12 AM  MELD score: 12 at 8/27/2018  8:12 AM  Calculated from:  Serum Creatinine: 1.1 mg/dL at 8/27/2018  8:12 AM  Serum Sodium: 130 mmol/L at 8/27/2018  8:12 AM  Total Bilirubin: 2.4 mg/dL at 8/27/2018  8:12 AM  INR(ratio): 1.1 at 8/27/2018  8:12 AM  Age: 68 years    Patient has been followed in [facility] for cirrhosis, but has been referred to our hospital to establish care due to worsening hyponatremia, ascites, weakness and liver function, need for tertiary care.     Presents today with daughter,  and with [facility] provided      Interval Hospitalization: 8/14/18 - 8/24/18  -- Per records, the patient had been declining recently with hyponatremia due to diuretics (Na of 121), that improved to 129 with " "holding diuretics.  She is now requiring paracentesis every 7-10 days  -- On admit, patient had blood and urine cx.  Blood cx w NGTD.  While awaiting cx results, she was treated empirically w Vanc and Cipro.   Cipro was changed to ertapenem on 8/20/18 per ID when cx resulted- Urine cx w (+) kleb pneumo and Enterococcus faecium.  Patient was treated w Cipro 8/16-8/20.  She was changed to Ertapenem 8/20 for 3 days.  Repeat urine cx w candida albicans <50,000- no need to treat.  Her ANC was low 8/23/18 at 739.  ID had changed IV abx to Zosyn for pseudomonas coverage Antibiotics were changed to pip/tazo for pseudomonal coverage given neutropenia.  -- Per ID, ok to d/c abx if pro calcitonin is trending down.  She received abx for 7 days.   -- Repeat UA,   Repeat UA to be followed up on. UA with hematuria, "many" bacteria and yeast from 8/24/18, to be repeated STAT today, possible contaminant given report of lack of targeted hygeine before urine sample providerd   -- Heme/Onc was also consulted for pancytopenia felt likely drug related and signed off.  -- Last paracentesis 8/21/18- 4700 ml removed, wbc 81/segs 2.  She will need weekly paracentesis  -- HE well managed w Lactulose (titrate to 3-4 BMs) and Rifaximin.  Hypotension better controlled w Midodrine 15 mg tid  -- Two solid right pulmonary nodules with the largest measuring 6 mm. Fleischner Society 2017 guidelines recommend follow up with non-contrast chest CT at 6-12 months and 18-24 months after discovery      Lab Results   Component Value Date    ALT 88 (H) 08/27/2018     (H) 08/27/2018    ALKPHOS 409 (H) 08/27/2018    BILITOT 2.4 (H) 08/27/2018    ALBUMIN 3.7 08/27/2018    INR 1.1 08/27/2018    PLT 59 (L) 08/27/2018       Hep C testing negative 8/15/2018  Hep B studies:  -- + sAg, DNA 79 (previously undetected per chart notes)  -- negative sAb  -- On Entecavir 0.5 mg daily     Presented to IR conference 7/24/18 to evaluate if transplant candidate   Plan: " "Thrombus from MPV to bifurcation; but potentially still doable.  Ok to come for evaluation    Complications:   1. Ascites - requiring weekly para. No diuretics post hospitalization for hyponatremia. Attempting to follow Low Na diet but is still adding salt to food, reports "not a lot" but not measuring   2. HE - taking Lactulose 30 ml once daily or twice daily, 5-6 small bowel movements daily, + gas and bloating, + discomfort, Not taking Xifaxan upon d/c. Denies Confusion or disorientation but reports consistent abd discomfort with Lactulose currently  3. H/o Varices bleeding per previous notes - last EGD with grade 1 varices, no active bleeding    Cirrhosis Health Maintenance:   -- Last EGD - 8/2018 grade 1 varices, repeat due 8/2019  -- HCC screening   MRI 8/2018 no lesions, next due 2/2019   AFP 2.9 8/2018, next due 2/2019  -- + Immunity to Hep A per labs    Denies current alcohol consumption     Denies jaundice, dark urine, hematemesis, melena, slowed mentation. No abnormal skin rashes. No generalized joint or muscle pain.     Review of patient's allergies indicates:  No Known Allergies               Current Medications    BLOOD SUGAR DIAGNOSTIC (ACCU-CHEK DC) STRP    1 strip by Misc.(Non-Drug; Combo Route) route 3 (three) times daily.    ENTECAVIR (BARACLUDE) 0.5 MG TAB    Take 0.5 mg by mouth once daily.    INSULIN ASPART U-100 (NOVOLOG) 100 UNIT/ML INJECTION    Inject 12 Units into the skin 3 (three) times daily before meals. Plus low dose sliding scale.    INSULIN GLARGINE (LANTUS) 100 UNIT/ML INJECTION    Inject 24 Units into the skin every evening.    MIDODRINE (PROAMATINE) 5 MG TAB    Take 3 tablets (15 mg total) by mouth every 8 (eight) hours.    SODIUM BICARBONATE 650 MG TABLET    Take 2 tablets (1,300 mg total) by mouth 2 (two) times daily.            LACTULOSE (CHRONULAC) 10 GRAM/15 ML SOLUTION lactulose (CHRONULAC) 10 gram/15 mL solution           Take 30 mLs by mouth once daily.        PMHX:  has " "a past medical history of Anemia requiring transfusions (8/16/2018), Ascites, 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 (8/15/2018), Cirrhosis, Esophageal varices, Esophageal varices without bleeding (8/15/2018), Hepatic encephalopathy, Hepatitis B, Hepatitis D virus infection, Hypersplenism, Hyponatremia, Hypotension, Liver transplant candidate, Neutropenia, Portal hypertension, Severe malnutrition (8/16/2018), and Type 2 diabetes mellitus, with long-term current use of insulin (8/15/2018).    PSHX:  has a past surgical history that includes EGD (ESOPHAGOGASTRODUODENOSCOPY) (N/A, 8/17/2018).    FAMILY HISTORY: Negative for liver disease, reviewed in EPIC    SOCIAL HISTORY:   Social History     Tobacco Use   Smoking Status Unknown If Ever Smoked       Social History     Substance and Sexual Activity   Alcohol Use No    Frequency: Never       Social History     Substance and Sexual Activity   Drug Use Not on file       ROS:   GENERAL: Denies fever, chills, weight loss/gain, + chronic fatigue  HEENT: Denies headaches, dizziness, vision/hearing changes  CARDIOVASCULAR: Denies chest pain, palpitations, or edema  RESPIRATORY: Denies dyspnea, + intermittent cough  GI: + intermittent LUQ and LLQ abdominal pain (no change since d/c from hospital), nausea. Denies rectal bleeding, vomiting. No change in bowel pattern or color  : Denies dysuria, hematuria   SKIN: Denies rash, itching   NEURO: Denies confusion, memory loss, or mood changes  PSYCH: Denies depression or anxiety  HEME/LYMPH: + easy bruising, denies easy bleeding    PHYSICAL EXAM:   Friendly White female, Chronically ill-appearing. Malnourished. Temporal wasting.  in no acute distress; alert and oriented to person, place and time  VITALS: BP (!) 96/59 (BP Location: Right arm, Patient Position: Sitting, BP Method: Medium (Automatic))   Pulse 91   Temp 98.2 °F (36.8 °C) (Oral)   Resp 16   Ht 5' 4.17" (1.63 m)   Wt 59.8 kg (131 lb 13.4 oz)   SpO2 " 97%   BMI 22.51 kg/m²   HENT: Normocephalic, without obvious abnormality. Oral mucosa pink and moist. Dentition good.  EYES: Sclerae mildly icteric. No conjunctival pallor.   NECK:  No masses or cervical adenopathy.  CARDIOVASCULAR: Regular rate and rhythm. No murmurs.  RESPIRATORY: Normal respiratory effort. BBS CTA. No wheezes or crackles.  GI: Soft, mildly tender to LUQ and LLQ, distended, Ascites with shifting dullness, not-tense. No masses palpable.   EXTREMITIES:  No clubbing, cyanosis or edema.  SKIN: Warm and dry. No jaundice. No rashes noted to exposed skin.+ telangectasias noted. No palmar erythema. No redness, erythema, localized swelling noted to abdomen. Paracentesis site healed, no redness  NEURO:  Normal gait ambulating ~50 ft in foot. No asterixis.  PSYCH:  Memory intact. Thought and speech pattern appropriate. Behavior normal. No depression or anxiety noted.    RECENT LABS:    Hepatitis A and B immunity markers:    Hepatitis A Antibody IgG   Date Value Ref Range Status   08/15/2018 Positive (A)  Final       Hepatitis B Surface Ag   Date Value Ref Range Status   08/15/2018 Positive (A)  Final     Hep B Core Total Ab   Date Value Ref Range Status   08/15/2018 Positive (A)  Final     Hep B S Ab   Date Value Ref Range Status   08/15/2018 Negative  Final       There is no immunization history on file for this patient.    Labs:  Lab Results   Component Value Date    WBC 2.23 (L) 08/24/2018    HGB 8.7 (L) 08/24/2018    HCT 26.2 (L) 08/24/2018    PLT 32 (LL) 08/24/2018     (L) 08/24/2018    K 4.4 08/24/2018    CREATININE 1.0 08/24/2018     (H) 08/24/2018     (H) 08/24/2018    ALKPHOS 341 (H) 08/24/2018    BILITOT 2.0 (H) 08/24/2018    ALBUMIN 3.5 08/24/2018    INR 1.2 08/24/2018    AFP 2.9 08/15/2018       DIAGNOSTIC STUDIES:  EGD-  Done 8/17/2018, repeat 8/2019  Impression:           - Small hiatal hernia.                        - Grade I esophageal varices.                        -  Portal hypertensive gastropathy.                        - Normal examined duodenum.                        - No active bleeding.                        - No specimens collected.    COLONOSCOPY-  ABD. U/S-  Done 8/15/18  FINDINGS:  The visualized portion of the pancreas is unremarkable.    The liver is normal in size measuring 12.4 cm.  The liver demonstrates nodular contour and heterogeneous echotexture.  No focal hepatic lesions are seen.    The gallbladder demonstrates a 1.0 cm mobile gallstone in the gallbladder neck, multiple punctate hyper echogenic foci in the gallbladder wall suggestive of small adherent stones or adenomyomatosis, and a 0.7 cm focal isoechoic region near the fundus which may represent adherent sludge or polyp.  The common duct is not dilated, measuring 3 mm.  The gallbladder wall is not thickened.  There is no sonographic Burgess sign.  No dilated intrahepatic radicles are seen.    The spleen is enlarged measuring 12.9 x 4.8 cm with a homogeneous echotexture.    The aorta tapers normally.    The kidneys are normal in size without focal abnormality or evidence of hydronephrosis.    There is moderate volume ascites.    The main portal vein, right portal vein, left portal vein, middle hepatic vein, right hepatic vein, left hepatic vein, SMV, and IVC are patent with proper directional flow.  The main hepatic artery is patent with normal waveform.  The umbilical vein is not patent.  The Celiac artery is normal on expiration with a velocity of 178 cm/sec.      Impression       Satisfactory Doppler ultrasound of the liver.    Cirrhotic liver with evidence of portal hypertension including splenomegaly and ascites.    Cholelithiasis, possible adenomyomatosis, and adherent sludge versus gallbladder polyp.  Recommend continued ultrasound surveillance for possible polyp.       CT SCAN-  MRI-  Done 8/16/18  FINDINGS:  Liver: Heterogeneous hepatic parenchyma with nodular contour compatible with history of  cirrhosis.  Subtle heterogeneous enhancement likely relating to regenerative nodules and fibrosis.  No discrete lesion identified.  Hepatic and portal veins are patent.    Biliary: Gallbladder contains a gallstone at the gallbladder neck.  No intrahepatic or extrahepatic biliary dilatation.    Pancreas: Unremarkable.  No pancreatic ductal dilatation.    Spleen: Enlarged in size with left upper quadrant collateral vessel formation.  No focal lesions.    Adrenal glands: Unremarkable.    Kidneys: Bilateral renal cortical cysts.  No renal masses.  No hydronephrosis.    Miscellaneous: Visualized bowel loops are unremarkable.  No evidence for lymphadenopathy.  Moderate volume ascites.    Small left pleural effusion.      Impression       Cirrhotic liver without discrete lesion.    Findings compatible with sequela of portal hypertension including splenomegaly, left upper quadrant collateral vessel formation and abdominal ascites.    Cholelithiasis.    Small left pleural effusion.       LIVER BIOPSY- none     ASSESSMENT:  68 y.o. White female with:  1.  Cirrhosis, decompensated, due to Hep B with Delta.    -- MELD-Na score: 18 at 8/27/2018  8:12 AM  MELD score: 12 at 8/27/2018  8:12 AM  Calculated from:  Serum Creatinine: 1.1 mg/dL at 8/27/2018  8:12 AM  Serum Sodium: 130 mmol/L at 8/27/2018  8:12 AM  Total Bilirubin: 2.4 mg/dL at 8/27/2018  8:12 AM  INR(ratio): 1.1 at 8/27/2018  8:12 AM  Age: 68 years  -- HCC screening: AFP and abd. U/S.. Repeat due 2/2019  -- + Immunity to Hep A   -- EGD in past, next due 8/2019      2. Hep B with delta  Hep B studies:  -- + sAg, DNA 79 (previously undetected per chart notes)  -- negative sAb  -- On Entecavir 0.5 mg daily   -- if renal function remains with GFR >50, can consider increase in dose to 1 mg daily     3. Positive urine culture  -- treated empirically w Vanc and Cipro.   Cipro was changed to ertapenem on 8/20/18 per ID when cx resulted- Urine cx w (+) kleb pneumo and  "Enterococcus faecium.  Patient was treated w Cipro 8/16-8/20.  She was changed to Ertapenem 8/20 for 3 days.  Repeat urine cx w candida albicans <50,000- no need to treat.   -- Per ID, ok to d/c abx if pro calcitonin is trending down.  She received abx for 7 days.   -- repeat urine culture 8/24/18 with hematuria, "many" bacteria and yeast from 8/24/18, to be repeated STAT today, possible partial contaminant given report of lack of targeted hygeine before urine sample providerd     4. Malnutrition  -- not drinking protein shakes (does not like the shakes she brought from home), not eating well due to lack of appetite  - + temporal wasting    5. Hyponatremia  -- off diuretics  -- fluid restriction 1 L, following restriction    6. Hepatic encephalopathy   -- lactulose dose 30 ml daily, not titrating to obtain BM goal, reports abd cramping, bloating, frequent gas. Not taking Xifaxan upon discharge  -- HE stable, denies confusion, disorientation. Does endorse gas, bloating, intolerance of Lactulose intermittent since d/c     7. Ascites  -- diuretic therapy on hold due to hyponatremia  -- Ascites protein >1 so will defer SBP prophylaxis  -- weekly paracentesis scheduled     8. Pulmonary nodule   -- per last ID note while hospitalized " In view of patient now being listed for liver transplant, increased procalcitonin and CT chest findings, recommend pulmonology consult for evaluation of left sided pleural effusion"  -- cryptogen ab ordered per ID negative  -- Recommend surveillance CT chest in 3 months to monitor pulmonary nodules seen on CT (11/2018), can repeat if not transplanted by then   -- discussed above recommendations with Dr. Worrell, pleural effusion likely r/t ascites, no need for pulmonology referral at this time, can repeat surveillance if not transplanted in 3 months    9. h/o Portal vein thrombosis   - per ID conference note: Thrombus from MPV to bifurcation; but potentially still doable.  Ok to come for " "evaluation  -- previously on Lovenox, discontinued while hospitalized due to pancytopenia, note per hospital med on 8/21/18: hold lovenox due to thrombocytopenia but may resume if plts improve  -- will check with Dr. Worrell for threshold to restart Lovenox    10. Chronic neuropathy  -- reports in upper and lower extremities, unchanged from past neuropathy  -- likely from DM but other etiologies possible  -- discussed with pt if wishes w/u can refer to neurology. Discouraged use of Magnesium to "treat" neuropathy given past Mg level WNL and can increase GI discomfort    11. Hypoalbuminemia   -- encouraged high protein diet, encouraged change to Premier Protein shakes, message sent to NED Kumar NP to inquire about scheudling nutritional prehab visit     12. Pancytopenia  -- hem onc consulted in hospital, -- Heme/Onc was also consulted for pancytopenia felt likely drug related and signed off    13. Type 2 DM  -- currently reports well controlled, fasting BG ~100, BG throughout day <200 most days, referral places for endocrinology while here in [place] but likely can wait until post transplant     14. Cough  -- denies change but cough continues, reports productive intermittently   -- will discuss with Dr. Worrell about utility of repeating Chest Xray    15. Hypotension, stable   -- on Midodrine 15 mg TID    EDUCATION:     All education completed with [facility] provided     The disease process and manifestations of cirrhosis were discussed.    Discussed implications of a cirrhosis diagnosis with HCC screenings and EGD and why it is important for us to properly monitor for potential complications.     Signs and symptoms of hepatic decompensation were reviewed, including jaundice, ascites, and slowed mentation due to hepatic encephalopathy. The patient should seek medical attention if any of these things occur.  We discussed the potential for bleeding from esophageal varices with symptoms of hematemesis and melena. The " patient should report to the Emergency Department for these symptoms.    We discussed the increased risk of hepatocellular carcinoma due to cirrhosis. Continued screening every six months with ultrasound and AFP is recommended, discussed with patient.     Cirrhosis Counseling  - strict abstinence of alcohol use (includes beer, wine, and/or liquor)  - compliance with lactulose (changed to BID and started with lower dose) and rifaximin (started today) for treatment of hepatic encephalopathy  - avoid non-steroidal anti-inflammatory drugs (NSAIDs) such as ibuprofen, Motrin, naprosyn, Alleve due to the risk of kidney damage  - can take acetaminophen (Tylenol), no more than 2000 mg per day  - low sodium (salt) 2 gram per day diet  - high protein diet: TO REVIEW GRAMS AT DIETICIAN VISIT recommend 70-90  grams per day to prevent muscle mass loss. Recommended at least 2 protein shake daily using Premier Protein shakes    - resistance exercises for muscle strength  - avoid raw seafoods due to the risk of fatal Vibrio vulnificus infection  - ultrasound of the liver every 6 months for liver cancer screening  - Upper endoscopy every 1-2 years to screen for varices in the stomach and esophagus      PLAN:  1. Labs twice weekly, clinic appts weekly (needs clinic appt next week with Dr. Worrell)  2. Paracentesis weekly and seeing coordinator weekly (per hospital notes)  3. Pt inquiring about process to get home health care currently, was instructed to set up home health upon d/c from hospital   4. UA and culture today  (done)  5. Change Lactulose to 15 ml BID (and instructed how to adjust for 3-4 BM daily), start Xifaxan 550 mg BID  6. Schedule visit with dietician NED Hill label reading in English (for protein and NA intake, requested by herbert) with RTC next week   7. Message sent for possible prehab visit for nutritional support with NED Kumar NP  8. If abd discomfort does not improve, may need abd imaging   9. Continue to hold  all Diuretic therapy  10. Paracentesis weekly, next scheduled for Wednesday   11. HCC screening Q 6 months with AFP and abd. U/S - both next due 2/2019  12. EGD to screen for varices, next due 8/2019  13. Message sent to Dr. Worrell about threshhold to restart lovenox, Entecavir dosing, and utility of chest Xray  14. if renal function remains GFR >50, may be able to consider increase in dose of Entecavir to 1 mg daily   15. May need exception points for ascites requiring weekly para if MELD continues to decrease   16. Cirrhosis counseling as noted above and discussed with patient and family   17. Recommend surveillance CT chest in 3 months to monitor pulmonary nodules seen on CT (11/2018), can repeat if not transplanted by then   18. Follow-up in about 1 week (around 9/3/2018). with Dr. Worrell, labs twice weekly     Note routed to Dr. Worrell, pre-liver transplant pool and message sent to Guillermina Vidales RN with above notes    Thank you for allowing me to participate in the care of Scarlett Reddy    Rita Rubin, NP-C    CC'ed note to:   VALENTINA Nino Dr.     Functional Status: 70% - Cares for self: unable to carry on normal activity or active work  Physical Capacity: Limited Mobility - using wheelchair to attend multiple appts, using walker or cane at home for stability. Able to walk ~50 ft in clinic without assistance

## 2018-08-27 NOTE — Clinical Note
Dr. Worrell,I had a couple more thoughts:1. Any utility to repeating her Xray given her continued cough, some report of productive cough intermittently2. Hep B DNA now detectable (older notes say previously undetectable), although <100. May be able to increase Entecavir to 1 mg daily if GFR remains >50 (currently GFR fluctuating)? 3. H/o PVT, was on Lovenox was held per hospital med note due to pancytopenia, although note reports can restart if platelets improve. Did not restart upon hospital d/c. Was not sure what threshold you wanted to use to restart Lovenox?Thanks Rita

## 2018-08-27 NOTE — TELEPHONE ENCOUNTER
PATIENT NAME: Scarlett Welchmocurtis  [facility] #: [id number]    Lab Results   Component Value Date    CREATININE 1.1 08/27/2018     (L) 08/27/2018    BILITOT 2.4 (H) 08/27/2018    ALBUMIN 3.7 08/27/2018    INR 1.1 08/27/2018     MELD 18  Encephalopathy: 1 - 2  Ascites: moderate  Dialysis: no     Recertification requestor: Charley Simms

## 2018-08-27 NOTE — LETTER
August 27, 2018                     [facility]  [address]  [facility] [address]  Phone: [phone]   Patient: Scarlett Reddy   MR Number: [id number]   YOB: 1950   Date of Visit: 8/27/2018       Dear      Thank you for referring Scarlett Reddy to me for evaluation. Attached you will find relevant portions of my assessment and plan of care.    If you have questions, please do not hesitate to call me. I look forward to following Scarlett Reddy along with you.    Sincerely,    Rita Rubin, NP    Enclosure    If you would like to receive this communication electronically, please contact [email or web address] or [phone] to request [employer] Link access.    [employer] Link is a tool which provides read-only access to select patient information with whom you have a relationship. Its easy to use and provides real time access to review your patients record including encounter summaries, notes, results, and demographic information.    If you feel you have received this communication in error or would no longer like to receive these types of communications, please e-mail [email or web address]

## 2018-08-27 NOTE — TELEPHONE ENCOUNTER
----- Message from Jerel Worrell MD sent at 8/27/2018  4:25 PM CDT -----  Hey,    1. That's a great idea, if you think that she is still coughing a lot, r/o bronchitis/PNA.  2. HBV DNA < 100, should be fine. Not significant. Since GFR fluctuates, I would keep 0.5 mg daily  3. So, recent scans do not show PVT. Probably resolved. We will not restart it. We can repeat US in 1 month. If PVT noted again, even if partial, we can think abt warfarin instead of Lovenox. Lovenox in portal hypertension papers from [place] are good and all, but here we have to play games with [employer] and we still place patients on warfarin, so than we would capture higher MELD.    Jerel  ----- Message -----  From: Rita Rubin NP  Sent: 8/27/2018   4:16 PM  To: MD Dr. Anaid Hauser,    I had a couple more thoughts:  1. Any utility to repeating her Xray given her continued cough, some report of productive cough intermittently  2. Hep B DNA now detectable (older notes say previously undetectable), although <100. May be able to increase Entecavir to 1 mg daily if GFR remains >50 (currently GFR fluctuating)?   3. H/o PVT, was on Lovenox was held per hospital med note due to pancytopenia, although note reports can restart if platelets improve. Did not restart upon hospital d/c. Was not sure what threshold you wanted to use to restart Lovenox?    Thanks   Rita

## 2018-08-28 ENCOUNTER — TELEPHONE (OUTPATIENT)
Dept: TRANSPLANT | Facility: CLINIC | Age: 68
End: 2018-08-28

## 2018-08-28 ENCOUNTER — HOSPITAL ENCOUNTER (OUTPATIENT)
Dept: RADIOLOGY | Facility: HOSPITAL | Age: 68
Discharge: HOME | End: 2018-08-28
Attending: INTERNAL MEDICINE
Payer: COMMERCIAL

## 2018-08-28 DIAGNOSIS — Z76.82 ORGAN TRANSPLANT CANDIDATE: Primary | ICD-10-CM

## 2018-08-28 DIAGNOSIS — Z76.82 ORGAN TRANSPLANT CANDIDATE: ICD-10-CM

## 2018-08-28 LAB — BACTERIA UR CULT: NO GROWTH

## 2018-08-28 PROCEDURE — 71046 X-RAY EXAM CHEST 2 VIEWS: CPT | Mod: TC,FY,TXP

## 2018-08-28 PROCEDURE — 71046 X-RAY EXAM CHEST 2 VIEWS: CPT | Mod: 26,TXP,, | Performed by: RADIOLOGY

## 2018-08-28 NOTE — TELEPHONE ENCOUNTER
----- Message from Rafael Hu sent at 8/28/2018  9:03 AM CDT -----  Regarding: FW: Please schedule this CXR for today  Pt dwaine for today at 12:15. Called [employer] and they will let pt know.  ----- Message -----  From: Guillermina Vidales RN  Sent: 8/28/2018   8:53 AM  To: Rfaael Hu  Subject: Please schedule this CXR for today               This came in late yesterday. Order is in.

## 2018-08-28 NOTE — PT/OT/SLP DISCHARGE
Physical Therapy Discharge Summary    Name: Scarlett Reddy  MRN: [id number]   Principal Problem: Pre-transplant evaluation for liver transplant     Patient Discharged from acute Physical Therapy on 18.  Please refer to prior PT noted date on 18 for functional status.     Assessment:     Patient was discharged unexpectedly.  Information required to complete an accurate discharge summary is unknown.  Refer to therapy initial evaluation and last progress note for initial and most recent functional status and goal achievement.  Recommendations made may be found in medical record.    Objective:     GOALS:   Multidisciplinary Problems     Physical Therapy Goals        Problem: Physical Therapy Goal    Goal Priority Disciplines Outcome Goal Variances Interventions   Physical Therapy Goal     PT, PT/OT Ongoing (interventions implemented as appropriate)     Description:  Goals to be met by: 9/3/18     Patient will increase functional independence with mobility by performin. Sit to stand transfer with Smith  2. Bed to chair transfer with Smith using LRD or no AD.  3. Gait  x 200 feet with Supervision using LRD or no AD.  4. Lower extremity exercise program x20 reps per handout, with independence                      Reasons for Discontinuation of Therapy Services  Therapist determines that the patient will no longer benefit from therapy services.      Plan:     Patient Discharged to: Home no PT services needed.    Filemon Cowan, PT  2018

## 2018-08-29 ENCOUNTER — HOSPITAL ENCOUNTER (OUTPATIENT)
Dept: INTERVENTIONAL RADIOLOGY/VASCULAR | Facility: HOSPITAL | Age: 68
Discharge: HOME | End: 2018-08-29
Attending: INTERNAL MEDICINE
Payer: COMMERCIAL

## 2018-08-29 VITALS
HEART RATE: 86 BPM | DIASTOLIC BLOOD PRESSURE: 45 MMHG | SYSTOLIC BLOOD PRESSURE: 93 MMHG | RESPIRATION RATE: 18 BRPM | OXYGEN SATURATION: 100 %

## 2018-08-29 DIAGNOSIS — R18.8 ASCITES OF LIVER: ICD-10-CM

## 2018-08-29 LAB — BACTERIA SPEC ANAEROBE CULT: NORMAL

## 2018-08-29 PROCEDURE — P9047 ALBUMIN (HUMAN), 25%, 50ML: HCPCS | Mod: JG,NTX | Performed by: INTERNAL MEDICINE

## 2018-08-29 PROCEDURE — 63600175 PHARM REV CODE 636 W HCPCS: Mod: JG,NTX | Performed by: INTERNAL MEDICINE

## 2018-08-29 PROCEDURE — 49083 ABD PARACENTESIS W/IMAGING: CPT | Mod: TXP

## 2018-08-29 PROCEDURE — 49083 ABD PARACENTESIS W/IMAGING: CPT | Mod: LT,NTX,, | Performed by: RADIOLOGY

## 2018-08-29 RX ORDER — ALBUMIN HUMAN 250 G/1000ML
25 SOLUTION INTRAVENOUS ONCE
Status: COMPLETED | OUTPATIENT
Start: 2018-08-29 | End: 2018-08-29

## 2018-08-29 RX ADMIN — ALBUMIN (HUMAN) 25 G: 25 SOLUTION INTRAVENOUS at 11:08

## 2018-08-29 NOTE — PROCEDURES
Radiology Post-Procedure Note    Pre Op Diagnosis: Ascites  Post Op Diagnosis: Same    Procedure: Ultrasound Guided Paracentesis    Procedure performed by: Juan Luis ALLISON, Elizabeth     Written Informed Consent Obtained: Yes  Specimen Removed: YES milky pink  Estimated Blood Loss: Minimal    Findings:   Successful paracentesis.  Albumin administered PRN per protocol.    Patient tolerated procedure well.    Elizabeth Mcknight, APRN, FNP  Interventional Radiology  [phone] [employer]

## 2018-08-29 NOTE — PROGRESS NOTES
Paracentesis complete. 4000 mLs peritoneal fluid drained. Pt tolerated well. Dressing to left abd clean, dry, and intact. Albumin 25% given 100 mLs. Pt discharged

## 2018-08-29 NOTE — H&P
Radiology History & Physical      SUBJECTIVE:     Chief Complaint: ascites    History of Present Illness:  Scarlett Reddy is a 68 y.o. female who presents for ultrasound guided paracentesis  Past Medical History:   Diagnosis Date    Anemia requiring transfusions 8/16/2018    Ascites     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 8/15/2018    Cirrhosis     Esophageal varices     Esophageal varices without bleeding 8/15/2018    Hepatic encephalopathy     Hepatitis B     Hepatitis D virus infection     Hypersplenism     Hyponatremia     Hypotension     Liver transplant candidate     Neutropenia     Portal hypertension     Severe malnutrition 8/16/2018    Type 2 diabetes mellitus, with long-term current use of insulin 8/15/2018     No past surgical history on file.    Home Meds:   Prior to Admission medications    Medication Sig Start Date End Date Taking? Authorizing Provider   blood sugar diagnostic (ACCU-CHEK DC) Strp 1 strip by Misc.(Non-Drug; Combo Route) route 3 (three) times daily.    Historical Provider, MD   entecavir (BARACLUDE) 0.5 MG Tab Take 0.5 mg by mouth once daily.    Historical Provider, MD   insulin aspart U-100 (NOVOLOG) 100 unit/mL injection Inject 12 Units into the skin 3 (three) times daily before meals. Plus low dose sliding scale. 8/24/18   Gigi Sprague MD   insulin glargine (LANTUS) 100 unit/mL injection Inject 24 Units into the skin every evening. 8/24/18   Gigi Sprague MD   lactulose (CHRONULAC) 10 gram/15 mL solution Take 15 mLs (10 g total) by mouth 2 (two) times daily. Adjust dose to have 4 bowel movements daily 8/27/18   Rita Rubin NP   midodrine (PROAMATINE) 5 MG Tab Take 3 tablets (15 mg total) by mouth every 8 (eight) hours. 8/24/18 2/20/19  Gigi Sprague MD   rifAXIMin (XIFAXAN) 550 mg Tab Take 1 tablet (550 mg total) by mouth 2 (two) times daily. 8/27/18   Rita Rubin NP   sodium bicarbonate 650 MG tablet Take 2 tablets (1,300 mg total) by  mouth 2 (two) times daily. 8/24/18 8/24/19  Gigi Sprague MD     Anticoagulants/Antiplatelets: no anticoagulation    Allergies: Review of patient's allergies indicates:  No Known Allergies  Sedation History:  no adverse reactions    Review of Systems:   Hematological: no known coagulopathies  Respiratory: no shortness of breath  Cardiovascular: no chest pain  Gastrointestinal: no abdominal pain  Genito-Urinary: no dysuria  Musculoskeletal: negative  Neurological: no TIA or stroke symptoms         OBJECTIVE:     Vital Signs (Most Recent)       Physical Exam:  ASA: 2  Mallampati: n/a    General: no acute distress  Mental Status: alert and oriented to person, place and time  HEENT: normocephalic, atraumatic  Chest: unlabored breathing  Heart: regular heart rate  Abdomen: distended  Extremity: moves all extremities    Laboratory  Lab Results   Component Value Date    INR 1.1 08/27/2018       Lab Results   Component Value Date    WBC 3.43 (L) 08/27/2018    HGB 10.0 (L) 08/27/2018    HCT 29.6 (L) 08/27/2018    MCV 91 08/27/2018    PLT 59 (L) 08/27/2018      Lab Results   Component Value Date     (H) 08/27/2018     (L) 08/27/2018    K 3.9 08/27/2018    CL 99 08/27/2018    CO2 20 (L) 08/27/2018    BUN 50 (H) 08/27/2018    CREATININE 1.1 08/27/2018    CALCIUM 8.6 (L) 08/27/2018    MG 2.1 08/24/2018    ALT 88 (H) 08/27/2018     (H) 08/27/2018    ALBUMIN 3.7 08/27/2018    BILITOT 2.4 (H) 08/27/2018       ASSESSMENT/PLAN:     Sedation Plan: local  Patient will undergo ultrasound guided paracentesis.    ASHOK Boston, FNP  Interventional Radiology  [phone] [employer]

## 2018-08-30 ENCOUNTER — LAB VISIT (OUTPATIENT)
Dept: LAB | Facility: HOSPITAL | Age: 68
End: 2018-08-30
Attending: INTERNAL MEDICINE
Payer: COMMERCIAL

## 2018-08-30 ENCOUNTER — HOSPITAL ENCOUNTER (INPATIENT)
Facility: HOSPITAL | Age: 68
LOS: 1 days | Discharge: HOME | DRG: 637 | End: 2018-08-31
Attending: TRANSPLANT SURGERY | Admitting: TRANSPLANT SURGERY
Payer: COMMERCIAL

## 2018-08-30 ENCOUNTER — TELEPHONE (OUTPATIENT)
Dept: TRANSPLANT | Facility: CLINIC | Age: 68
End: 2018-08-30

## 2018-08-30 DIAGNOSIS — Z76.82 ORGAN TRANSPLANT CANDIDATE: ICD-10-CM

## 2018-08-30 DIAGNOSIS — R05.9 COUGH: ICD-10-CM

## 2018-08-30 DIAGNOSIS — B18.0 CHRONIC HEPATITIS B WITH DELTA AGENT WITH CIRRHOSIS: ICD-10-CM

## 2018-08-30 DIAGNOSIS — E11.9 TYPE 2 DIABETES MELLITUS, WITH LONG-TERM CURRENT USE OF INSULIN: ICD-10-CM

## 2018-08-30 DIAGNOSIS — Z79.4 TYPE 2 DIABETES MELLITUS, WITH LONG-TERM CURRENT USE OF INSULIN: ICD-10-CM

## 2018-08-30 DIAGNOSIS — R18.8 OTHER ASCITES: ICD-10-CM

## 2018-08-30 DIAGNOSIS — R73.9 HYPERGLYCEMIA: ICD-10-CM

## 2018-08-30 DIAGNOSIS — R53.1 GENERALIZED WEAKNESS: ICD-10-CM

## 2018-08-30 DIAGNOSIS — R53.81 PHYSICAL DECONDITIONING: ICD-10-CM

## 2018-08-30 DIAGNOSIS — K74.60 CHRONIC HEPATITIS B WITH DELTA AGENT WITH CIRRHOSIS: ICD-10-CM

## 2018-08-30 DIAGNOSIS — E87.1 HYPONATREMIA: Primary | ICD-10-CM

## 2018-08-30 DIAGNOSIS — E43 SEVERE MALNUTRITION: ICD-10-CM

## 2018-08-30 DIAGNOSIS — K76.82 HEPATIC ENCEPHALOPATHY: ICD-10-CM

## 2018-08-30 PROBLEM — I85.00 ESOPHAGEAL VARICES WITHOUT BLEEDING: Status: RESOLVED | Noted: 2018-08-15 | Resolved: 2018-08-30

## 2018-08-30 LAB
ALBUMIN SERPL BCP-MCNC: 3.5 G/DL
ALP SERPL-CCNC: 364 U/L
ALT SERPL W/O P-5'-P-CCNC: 60 U/L
ANION GAP SERPL CALC-SCNC: 11 MMOL/L
AST SERPL-CCNC: 91 U/L
BASOPHILS # BLD AUTO: 0.02 K/UL
BASOPHILS NFR BLD: 0.5 %
BILIRUB SERPL-MCNC: 2.5 MG/DL
BUN SERPL-MCNC: 50 MG/DL
CALCIUM SERPL-MCNC: 8.6 MG/DL
CHLORIDE SERPL-SCNC: 94 MMOL/L
CO2 SERPL-SCNC: 21 MMOL/L
CREAT SERPL-MCNC: 1.1 MG/DL
DIFFERENTIAL METHOD: ABNORMAL
EOSINOPHIL # BLD AUTO: 0.1 K/UL
EOSINOPHIL NFR BLD: 1.6 %
ERYTHROCYTE [DISTWIDTH] IN BLOOD BY AUTOMATED COUNT: 18.8 %
EST. GFR  (AFRICAN AMERICAN): 59.2 ML/MIN/1.73 M^2
EST. GFR  (NON AFRICAN AMERICAN): 51.3 ML/MIN/1.73 M^2
GLUCOSE SERPL-MCNC: 300 MG/DL
HCT VFR BLD AUTO: 28.4 %
HGB BLD-MCNC: 9.3 G/DL
IMM GRANULOCYTES # BLD AUTO: 0.08 K/UL
IMM GRANULOCYTES NFR BLD AUTO: 2.1 %
INR PPP: 1.1
LYMPHOCYTES # BLD AUTO: 0.6 K/UL
LYMPHOCYTES NFR BLD: 14.7 %
MCH RBC QN AUTO: 30.6 PG
MCHC RBC AUTO-ENTMCNC: 32.7 G/DL
MCV RBC AUTO: 93 FL
MONOCYTES # BLD AUTO: 0.4 K/UL
MONOCYTES NFR BLD: 10.8 %
NEUTROPHILS # BLD AUTO: 2.7 K/UL
NEUTROPHILS NFR BLD: 70.3 %
NRBC BLD-RTO: 0 /100 WBC
PLATELET # BLD AUTO: 99 K/UL
PMV BLD AUTO: 12.2 FL
POCT GLUCOSE: 228 MG/DL (ref 70–110)
POCT GLUCOSE: 234 MG/DL (ref 70–110)
POTASSIUM SERPL-SCNC: 3.7 MMOL/L
PROT SERPL-MCNC: 6.1 G/DL
PROTHROMBIN TIME: 11.2 SEC
RBC # BLD AUTO: 3.04 M/UL
SODIUM SERPL-SCNC: 126 MMOL/L
WBC # BLD AUTO: 3.8 K/UL

## 2018-08-30 PROCEDURE — 85025 COMPLETE CBC W/AUTO DIFF WBC: CPT | Mod: TXP

## 2018-08-30 PROCEDURE — 25000003 PHARM REV CODE 250: Mod: NTX | Performed by: NURSE PRACTITIONER

## 2018-08-30 PROCEDURE — 63600175 PHARM REV CODE 636 W HCPCS: Mod: NTX | Performed by: TRANSPLANT SURGERY

## 2018-08-30 PROCEDURE — 80053 COMPREHEN METABOLIC PANEL: CPT | Mod: TXP

## 2018-08-30 PROCEDURE — 85610 PROTHROMBIN TIME: CPT | Mod: TXP

## 2018-08-30 PROCEDURE — 63600175 PHARM REV CODE 636 W HCPCS: Mod: NTX | Performed by: NURSE PRACTITIONER

## 2018-08-30 PROCEDURE — 63600175 PHARM REV CODE 636 W HCPCS: Mod: NTX | Performed by: PHYSICIAN ASSISTANT

## 2018-08-30 PROCEDURE — 87040 BLOOD CULTURE FOR BACTERIA: CPT | Mod: 59,NTX

## 2018-08-30 PROCEDURE — 99223 1ST HOSP IP/OBS HIGH 75: CPT | Mod: NTX,,, | Performed by: NURSE PRACTITIONER

## 2018-08-30 PROCEDURE — 36415 COLL VENOUS BLD VENIPUNCTURE: CPT | Mod: NTX

## 2018-08-30 PROCEDURE — 25000003 PHARM REV CODE 250: Mod: NTX | Performed by: PHYSICIAN ASSISTANT

## 2018-08-30 PROCEDURE — 20600001 HC STEP DOWN PRIVATE ROOM: Mod: NTX

## 2018-08-30 PROCEDURE — 36415 COLL VENOUS BLD VENIPUNCTURE: CPT | Mod: TXP

## 2018-08-30 PROCEDURE — 99222 1ST HOSP IP/OBS MODERATE 55: CPT | Mod: NTX,,, | Performed by: NURSE PRACTITIONER

## 2018-08-30 RX ORDER — INSULIN ASPART 100 [IU]/ML
10 INJECTION, SOLUTION INTRAVENOUS; SUBCUTANEOUS
Status: DISCONTINUED | OUTPATIENT
Start: 2018-08-31 | End: 2018-08-31 | Stop reason: HOSPADM

## 2018-08-30 RX ORDER — SODIUM CHLORIDE 0.9 % (FLUSH) 0.9 %
3 SYRINGE (ML) INJECTION
Status: DISCONTINUED | OUTPATIENT
Start: 2018-08-30 | End: 2018-08-31 | Stop reason: HOSPADM

## 2018-08-30 RX ORDER — ENTECAVIR 0.5 MG/1
0.5 TABLET, FILM COATED ORAL DAILY
Status: DISCONTINUED | OUTPATIENT
Start: 2018-08-31 | End: 2018-08-31 | Stop reason: HOSPADM

## 2018-08-30 RX ORDER — INSULIN ASPART 100 [IU]/ML
12 INJECTION, SOLUTION INTRAVENOUS; SUBCUTANEOUS
Status: DISCONTINUED | OUTPATIENT
Start: 2018-08-30 | End: 2018-08-30

## 2018-08-30 RX ORDER — INSULIN ASPART 100 [IU]/ML
0-5 INJECTION, SOLUTION INTRAVENOUS; SUBCUTANEOUS
Status: DISCONTINUED | OUTPATIENT
Start: 2018-08-30 | End: 2018-08-31 | Stop reason: HOSPADM

## 2018-08-30 RX ORDER — ONDANSETRON 4 MG/1
4 TABLET, ORALLY DISINTEGRATING ORAL ONCE
Status: COMPLETED | OUTPATIENT
Start: 2018-08-30 | End: 2018-08-30

## 2018-08-30 RX ORDER — LACTULOSE 10 G/15ML
15 SOLUTION ORAL 2 TIMES DAILY
Status: DISCONTINUED | OUTPATIENT
Start: 2018-08-30 | End: 2018-08-31 | Stop reason: HOSPADM

## 2018-08-30 RX ORDER — IBUPROFEN 200 MG
16 TABLET ORAL
Status: DISCONTINUED | OUTPATIENT
Start: 2018-08-30 | End: 2018-08-31 | Stop reason: HOSPADM

## 2018-08-30 RX ORDER — SODIUM BICARBONATE 650 MG/1
1300 TABLET ORAL 2 TIMES DAILY
Status: DISCONTINUED | OUTPATIENT
Start: 2018-08-30 | End: 2018-08-31 | Stop reason: HOSPADM

## 2018-08-30 RX ORDER — IBUPROFEN 200 MG
24 TABLET ORAL
Status: DISCONTINUED | OUTPATIENT
Start: 2018-08-30 | End: 2018-08-31 | Stop reason: HOSPADM

## 2018-08-30 RX ORDER — MIDODRINE HYDROCHLORIDE 5 MG/1
15 TABLET ORAL EVERY 8 HOURS
Status: DISCONTINUED | OUTPATIENT
Start: 2018-08-30 | End: 2018-08-31 | Stop reason: HOSPADM

## 2018-08-30 RX ORDER — GLUCAGON 1 MG
1 KIT INJECTION
Status: DISCONTINUED | OUTPATIENT
Start: 2018-08-30 | End: 2018-08-31 | Stop reason: HOSPADM

## 2018-08-30 RX ORDER — HEPARIN SODIUM 5000 [USP'U]/ML
5000 INJECTION, SOLUTION INTRAVENOUS; SUBCUTANEOUS EVERY 8 HOURS
Status: DISCONTINUED | OUTPATIENT
Start: 2018-08-30 | End: 2018-08-31 | Stop reason: HOSPADM

## 2018-08-30 RX ADMIN — LACTULOSE 15 G: 20 SOLUTION ORAL at 09:08

## 2018-08-30 RX ADMIN — ONDANSETRON 4 MG: 4 TABLET, ORALLY DISINTEGRATING ORAL at 05:08

## 2018-08-30 RX ADMIN — SODIUM BICARBONATE 1300 MG: 650 TABLET ORAL at 09:08

## 2018-08-30 RX ADMIN — MIDODRINE HYDROCHLORIDE 15 MG: 5 TABLET ORAL at 09:08

## 2018-08-30 RX ADMIN — INSULIN DETEMIR 20 UNITS: 100 INJECTION, SOLUTION SUBCUTANEOUS at 09:08

## 2018-08-30 RX ADMIN — RIFAXIMIN 550 MG: 550 TABLET ORAL at 09:08

## 2018-08-30 RX ADMIN — INSULIN ASPART 12 UNITS: 100 INJECTION, SOLUTION INTRAVENOUS; SUBCUTANEOUS at 06:08

## 2018-08-30 NOTE — SUBJECTIVE & OBJECTIVE
Past Medical History:   Diagnosis Date    Anemia requiring transfusions 8/16/2018    Ascites     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 8/15/2018    Cirrhosis     Esophageal varices     Esophageal varices without bleeding 8/15/2018    Hepatic encephalopathy     Hepatitis B     Hepatitis D virus infection     Hypersplenism     Hyponatremia     Hypotension     Liver transplant candidate     Neutropenia     Portal hypertension     Severe malnutrition 8/16/2018    Type 2 diabetes mellitus, with long-term current use of insulin 8/15/2018       History reviewed. No pertinent surgical history.    Review of patient's allergies indicates:  No Known Allergies    Family History     None        Tobacco Use    Smoking status: Unknown If Ever Smoked   Substance and Sexual Activity    Alcohol use: No     Frequency: Never    Drug use: No    Sexual activity: Not on file       PTA Medications   Medication Sig    blood sugar diagnostic (ACCU-CHEK DC) Strp 1 strip by Misc.(Non-Drug; Combo Route) route 3 (three) times daily.    entecavir (BARACLUDE) 0.5 MG Tab Take 0.5 mg by mouth once daily.    insulin aspart U-100 (NOVOLOG) 100 unit/mL injection Inject 12 Units into the skin 3 (three) times daily before meals. Plus low dose sliding scale.    insulin glargine (LANTUS) 100 unit/mL injection Inject 24 Units into the skin every evening.    lactulose (CHRONULAC) 10 gram/15 mL solution Take 15 mLs (10 g total) by mouth 2 (two) times daily. Adjust dose to have 4 bowel movements daily    midodrine (PROAMATINE) 5 MG Tab Take 3 tablets (15 mg total) by mouth every 8 (eight) hours.    rifAXIMin (XIFAXAN) 550 mg Tab Take 1 tablet (550 mg total) by mouth 2 (two) times daily.    sodium bicarbonate 650 MG tablet Take 2 tablets (1,300 mg total) by mouth 2 (two) times daily.       Review of Systems   Constitutional: Positive for activity change, appetite change and fatigue. Negative for chills, diaphoresis  and fever.   Respiratory: Positive for cough. Negative for shortness of breath.    Cardiovascular: Positive for leg swelling. Negative for chest pain and palpitations.   Gastrointestinal: Positive for abdominal distention, abdominal pain, diarrhea (lactulose), nausea and vomiting. Negative for constipation.   Genitourinary: Negative for decreased urine volume and dysuria.   Musculoskeletal: Negative for arthralgias and back pain.   Neurological: Positive for weakness. Negative for tremors.   Psychiatric/Behavioral: Negative for confusion.     Objective:     Vital Signs (Most Recent):  Temp: 98 °F (36.7 °C)(called nurse alejandro) (08/30/18 1534)  Pulse: 95 (08/30/18 1534)  Resp: 16 (08/30/18 1534)  BP: (!) 115/54 (08/30/18 1534)  SpO2: 98 % (08/30/18 1534) Vital Signs (24h Range):  Temp:  [98 °F (36.7 °C)] 98 °F (36.7 °C)  Pulse:  [95] 95  Resp:  [16] 16  SpO2:  [98 %] 98 %  BP: (115)/(54) 115/54     Weight: 59.8 kg (131 lb 13.4 oz)  Body mass index is 22.79 kg/m².    No intake or output data in the 24 hours ending 08/30/18 1613    Physical Exam   Constitutional: She is oriented to person, place, and time. She appears well-developed. No distress.   Temporal and distal extremity muscle wasting   HENT:   Head: Normocephalic and atraumatic.   Eyes: Scleral icterus is present.   Cardiovascular: Normal rate, regular rhythm and normal heart sounds.   Pulmonary/Chest: Effort normal. She has rales.   Decreased R base   Abdominal: Bowel sounds are normal. She exhibits distension (ascites\). There is no tenderness.   Musculoskeletal: Normal range of motion. She exhibits edema (trace).   Neurological: She is alert and oriented to person, place, and time.   Skin: Skin is warm and dry. She is not diaphoretic.   Psychiatric: She has a normal mood and affect. Her behavior is normal. Judgment and thought content normal.   Nursing note and vitals reviewed.      Laboratory:  CBC:   Recent Labs   Lab  08/30/18   0914   WBC  3.80*   RBC   3.04*   HGB  9.3*   HCT  28.4*   PLT  99*   MCV  93   MCH  30.6   MCHC  32.7     CMP:   Recent Labs   Lab  08/30/18   0914   GLU  300*   CALCIUM  8.6*   ALBUMIN  3.5   PROT  6.1   NA  126*   K  3.7   CO2  21*   CL  94*   BUN  50*   CREATININE  1.1   ALKPHOS  364*   ALT  60*   AST  91*   BILITOT  2.5*     Labs within the past 24 hours have been reviewed.    Diagnostic Results:  I have personally reviewed all pertinent imaging studies.

## 2018-08-30 NOTE — H&P
[facility]  Liver Transplant  History & Physical    Patient Name: Scarlett Reddy  MRN: [id number]  Admission Date: 8/30/2018  Code Status: Full Code  Primary Care Provider: Primary Doctor No    Subjective:     History of Present Illness:  Scarlett Reddy is a 67 y/o female with past medical history of Hep B and D cirrhosis, on Entecavir with unquantifiable Hep B DNA, c/b EV with bleeding, HE, previous H/O PV thrombosis, and refractory ascites, T2DM, who presenteded as a transfer from [facility] for evaluation for liver transplant surgery. Pt worked up and listed during hospital stay and was also treated for acute on chronic HE and hyponatremia.  Of note, pt on Lovenox prior to admit for PVT held at previous discharge.  Pt found to have UTI on previous admit: Urine cx w (+) kleb pneumo and Enterococcus faecium which was treated during stay.    Pt found to have uncontrolled BG on outpt labs with hyponatremia (corrected 130).  Also feeling weak, + nausea, and poor appetite as outpt.  She was sent [place] [facility] [place].  MELD 22 on day of admit.  Pt with chronic HE, no current confusion.           Past Medical History:   Diagnosis Date    Anemia requiring transfusions 8/16/2018    Ascites     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 8/15/2018    Cirrhosis     Esophageal varices     Esophageal varices without bleeding 8/15/2018    Hepatic encephalopathy     Hepatitis B     Hepatitis D virus infection     Hypersplenism     Hyponatremia     Hypotension     Liver transplant candidate     Neutropenia     Portal hypertension     Severe malnutrition 8/16/2018    Type 2 diabetes mellitus, with long-term current use of insulin 8/15/2018       History reviewed. No pertinent surgical history.    Review of patient's allergies indicates:  No Known Allergies    Family History     None        Tobacco Use    Smoking status: Unknown If Ever Smoked   Substance and Sexual Activity    Alcohol use:  No     Frequency: Never    Drug use: No    Sexual activity: Not on file       PTA Medications   Medication Sig    blood sugar diagnostic (ACCU-CHEK DC) Strp 1 strip by Misc.(Non-Drug; Combo Route) route 3 (three) times daily.    entecavir (BARACLUDE) 0.5 MG Tab Take 0.5 mg by mouth once daily.    insulin aspart U-100 (NOVOLOG) 100 unit/mL injection Inject 12 Units into the skin 3 (three) times daily before meals. Plus low dose sliding scale.    insulin glargine (LANTUS) 100 unit/mL injection Inject 24 Units into the skin every evening.    lactulose (CHRONULAC) 10 gram/15 mL solution Take 15 mLs (10 g total) by mouth 2 (two) times daily. Adjust dose to have 4 bowel movements daily    midodrine (PROAMATINE) 5 MG Tab Take 3 tablets (15 mg total) by mouth every 8 (eight) hours.    rifAXIMin (XIFAXAN) 550 mg Tab Take 1 tablet (550 mg total) by mouth 2 (two) times daily.    sodium bicarbonate 650 MG tablet Take 2 tablets (1,300 mg total) by mouth 2 (two) times daily.       Review of Systems   Constitutional: Positive for activity change, appetite change and fatigue. Negative for chills, diaphoresis and fever.   Respiratory: Positive for cough. Negative for shortness of breath.    Cardiovascular: Positive for leg swelling. Negative for chest pain and palpitations.   Gastrointestinal: Positive for abdominal distention, abdominal pain, diarrhea (lactulose), nausea and vomiting. Negative for constipation.   Genitourinary: Negative for decreased urine volume and dysuria.   Musculoskeletal: Negative for arthralgias and back pain.   Neurological: Positive for weakness. Negative for tremors.   Psychiatric/Behavioral: Negative for confusion.     Objective:     Vital Signs (Most Recent):  Temp: 98 °F (36.7 °C)(called nurse alejandro) (08/30/18 1534)  Pulse: 95 (08/30/18 1534)  Resp: 16 (08/30/18 1534)  BP: (!) 115/54 (08/30/18 1534)  SpO2: 98 % (08/30/18 1534) Vital Signs (24h Range):  Temp:  [98 °F (36.7 °C)] 98 °F (36.7  °C)  Pulse:  [95] 95  Resp:  [16] 16  SpO2:  [98 %] 98 %  BP: (115)/(54) 115/54     Weight: 59.8 kg (131 lb 13.4 oz)  Body mass index is 22.79 kg/m².    No intake or output data in the 24 hours ending 08/30/18 1613    Physical Exam   Constitutional: She is oriented to person, place, and time. She appears well-developed. No distress.   Temporal and distal extremity muscle wasting   HENT:   Head: Normocephalic and atraumatic.   Eyes: Scleral icterus is present.   Cardiovascular: Normal rate, regular rhythm and normal heart sounds.   Pulmonary/Chest: Effort normal. She has rales.   Decreased R base   Abdominal: Bowel sounds are normal. She exhibits distension (ascites\). There is no tenderness.   Musculoskeletal: Normal range of motion. She exhibits edema (trace).   Neurological: She is alert and oriented to person, place, and time.   Skin: Skin is warm and dry. She is not diaphoretic.   Psychiatric: She has a normal mood and affect. Her behavior is normal. Judgment and thought content normal.   Nursing note and vitals reviewed.      Laboratory:  CBC:   Recent Labs   Lab  08/30/18   0914   WBC  3.80*   RBC  3.04*   HGB  9.3*   HCT  28.4*   PLT  99*   MCV  93   MCH  30.6   MCHC  32.7     CMP:   Recent Labs   Lab  08/30/18   0914   GLU  300*   CALCIUM  8.6*   ALBUMIN  3.5   PROT  6.1   NA  126*   K  3.7   CO2  21*   CL  94*   BUN  50*   CREATININE  1.1   ALKPHOS  364*   ALT  60*   AST  91*   BILITOT  2.5*     Labs within the past 24 hours have been reviewed.    Diagnostic Results:  I have personally reviewed all pertinent imaging studies.    Assessment/Plan:     Cough    - chest xray   - rales throughout          Ascites    - last paracentesis 8/29  - site with some oozing, bandage placed          Generalized weakness    - Blood/urine cx sent          Physical deconditioning    - PT/OT consulted          Hyperglycemia    - endocrine consulted  - patient states was not fasting prior to labs          Hyponatremia    -  Corrected Na 130  - Fluid restriction in place- 1000 cc          Severe malnutrition    - Dietary consulted  - Boost ordered  - Protein supplements ordered          Type 2 diabetes mellitus, with long-term current use of insulin    - Pt uncontrolled as outpt  - Endocrine consulted          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    - Pt listed for liver txp with MELD 22  - MELD-Na score: 22 at 8/30/2018  9:14 AM  MELD score: 12 at 8/30/2018  9:14 AM  Calculated from:  Serum Creatinine: 1.1 mg/dL at 8/30/2018  9:14 AM  Serum Sodium: 126 mmol/L at 8/30/2018  9:14 AM  Total Bilirubin: 2.5 mg/dL at 8/30/2018  9:14 AM  INR(ratio): 1.1 at 8/30/2018  9:14 AM  Age: 68 years          Hepatic encephalopathy    - Chronic  - Resume home lactulose/rifaximin              MELD-Na score: 22 at 8/30/2018  9:14 AM  MELD score: 12 at 8/30/2018  9:14 AM  Calculated from:  Serum Creatinine: 1.1 mg/dL at 8/30/2018  9:14 AM  Serum Sodium: 126 mmol/L at 8/30/2018  9:14 AM  Total Bilirubin: 2.5 mg/dL at 8/30/2018  9:14 AM  INR(ratio): 1.1 at 8/30/2018  9:14 AM  Age: 68 years    Seble Tineo, NP  Liver Transplant  [facility]

## 2018-08-30 NOTE — ASSESSMENT & PLAN NOTE
Bg goal 140-180 inpatient.       Continue Levemir 20 units HS.   Decrease Novolog 10 units with meals.  Bg monitoring ac/hs and low dose correction scale.       Discharge plans- TBD likely resume home regimen as A1C at goal.

## 2018-08-30 NOTE — TELEPHONE ENCOUNTER
[id number]    Lab Results   Component Value Date    CREATININE 1.1 08/30/2018     (L) 08/30/2018    BILITOT 2.5 (H) 08/30/2018    ALBUMIN 3.5 08/30/2018    INR 1.1 08/30/2018     MELD 22  Encephalopathy: 1 - 2  Ascites: moderate  Dialysis: no     Recertification requestor: Charley Simms

## 2018-08-30 NOTE — ASSESSMENT & PLAN NOTE
- Pt listed for liver txp with MELD 22  - MELD-Na score: 22 at 8/30/2018  9:14 AM  MELD score: 12 at 8/30/2018  9:14 AM  Calculated from:  Serum Creatinine: 1.1 mg/dL at 8/30/2018  9:14 AM  Serum Sodium: 126 mmol/L at 8/30/2018  9:14 AM  Total Bilirubin: 2.5 mg/dL at 8/30/2018  9:14 AM  INR(ratio): 1.1 at 8/30/2018  9:14 AM  Age: 68 years

## 2018-08-30 NOTE — CONSULTS
[facility]  Endocrinology  Diabetes Consult Note    Consult Requested by: Cristobal Marmolejo Jr., MD   Reason for admit: Type 2 diabetes mellitus, with long-term current use of insulin    HISTORY OF PRESENT ILLNESS:  Reason for Consult: Management of type 2 DM, Hyperglycemia     Diabetes diagnosis: ~ 7 years ago    Home Diabetes Medications: Lantus 24 units HS and novolog 12 units with meals plus correction scale  Lab Results   Component Value Date    HGBA1C 6.8 (H) 2018       How often checking glucose at home? 4 times a day    BG readings on regimen: 80-150s  Hypoglycemia on the regimen?  No  Missed doses on regimen?  No    Diabetes Complications include:     Hyperglycemia and Diabetic peripheral neuropathy     Complicating diabetes co morbidities:   CIRRHOSIS      HPI:   Patient is a 68 y.o. female with a diagnosis of type 2 DM well controlled on MDI. Hep B and D cirrhosis who presenteded as a transfer from [facility] for evaluation for liver transplant surgery. Pt found to have uncontrolled BG on outpt labs with hyponatremia.  Also feeling weak, + nausea, and poor appetite as outpt.   [place] [facility] [place] Patient reports that she ate before getting labs this morning because she didn't know she was supposed to fast. Endocrinology consulted for BG/ DM management.                Interval HPI:   Admitted to 10 th floor. Family and interpretor at bedside. Patient reports eating before labs this AM and before dinner BG reading.   Eatin%  Or less- kosher diet ordered  Nausea: Yes  Hypoglycemia and intervention: No  Fever: No  TPN and/or TF: No    PMH, PSH, FH, SH reviewed     Review of Systems   Constitutional: Negative for weight changes.  Eyes: Negative for visual disturbance.  Respiratory: + for cough.   Cardiovascular: Negative for chest pain.  Gastrointestinal: + for nausea.  Endocrine: Negative for polyuria, polydipsia.  Musculoskeletal: Negative for back pain.  Skin: Negative  for rash.  Neurological: Negative for syncope.  Psychiatric/Behavioral: Negative for depression.      Current Medications and/or Treatments Impacting Glycemic Control  Immunotherapy:    Immunosuppressants     None        Steroids:   Hormones (From admission, onward)    None        Pressors:    Autonomic Drugs (From admission, onward)    Start     Stop Route Frequency Ordered    08/30/18 2200  midodrine tablet 15 mg      -- Oral Every 8 hours 08/30/18 1521        Hyperglycemia/Diabetes Medications:   Antihyperglycemics (From admission, onward)    Start     Stop Route Frequency Ordered    08/30/18 2100  insulin detemir U-100 pen 20 Units      -- SubQ Nightly 08/30/18 1521    08/30/18 1630  insulin aspart U-100 pen 12 Units      -- SubQ 3 times daily before meals 08/30/18 1611    08/30/18 1621  insulin aspart U-100 pen 0-5 Units      -- SubQ Before meals & nightly PRN 08/30/18 1522             PHYSICAL EXAMINATION:  Vitals:    08/30/18 1534   BP: (!) 115/54   Pulse: 95   Resp: 16   Temp: 98 °F (36.7 °C)     Body mass index is 22.79 kg/m².    Physical Exam   Constitutional:  Well developed, well nourished, NAD.  ENT: External ears no masses with nose patent; normal hearing.   Neck:  Supple; trachea midline.  Cardiovascular: Normal heart sounds, no LE edema.     Lungs:  Normal effort; lungs anterior bilaterally crackles to auscultation.  Abdomen:  No masses, tenderness, or hernias, + distension.  MS: No clubbing or cyanosis of nails noted;  unable to assess gait.  Skin: No rashes, lesions, or ulcers; no nodules.  Psychiatric: Good judgement and insight; normal mood and affect.  Neurological: Cranial nerves are grossly intact.         Labs Reviewed and Include   Recent Labs   Lab  08/30/18   0914   GLU  300*   CALCIUM  8.6*   ALBUMIN  3.5   PROT  6.1   NA  126*   K  3.7   CO2  21*   CL  94*   BUN  50*   CREATININE  1.1   ALKPHOS  364*   ALT  60*   AST  91*   BILITOT  2.5*     Lab Results   Component Value Date    WBC 3.80  (L) 08/30/2018    HGB 9.3 (L) 08/30/2018    HCT 28.4 (L) 08/30/2018    MCV 93 08/30/2018    PLT 99 (L) 08/30/2018     No results for input(s): TSH, FREET4 in the last 168 hours.  Lab Results   Component Value Date    HGBA1C 6.8 (H) 08/16/2018       Nutritional status:   Body mass index is 22.79 kg/m².  Lab Results   Component Value Date    ALBUMIN 3.5 08/30/2018    ALBUMIN 3.7 08/27/2018    ALBUMIN 3.5 08/24/2018     No results found for: PREALBUMIN    Estimated Creatinine Clearance: 41.3 mL/min (based on SCr of 1.1 mg/dL).    Accu-Checks  Recent Labs      08/30/18   1843   POCTGLUCOSE  234*        ASSESSMENT and PLAN    * Type 2 diabetes mellitus, with long-term current use of insulin    Bg goal 140-180 inpatient.       Continue Levemir 20 units HS.   Decrease Novolog 10 units with meals.  Bg monitoring ac/hs and low dose correction scale.       Discharge plans- TBD likely resume home regimen as A1C at goal.           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    Managed per primary.   Listed for liver transplant.   May impact insulin needs              Plan discussed with patient, family, and RN at bedside.     Aviva Caldera NP  Endocrinology  [facility]

## 2018-08-30 NOTE — TELEPHONE ENCOUNTER
Reviewed labs on patient with Dr. Worrell.  Based on Glucose, Na and patient frailty, order received to admit patient. Admit called for reservation. Patient and daughter instructed with help of  Dunia, and ELIUD CERDA called with report and will place admit orders.

## 2018-08-30 NOTE — SUBJECTIVE & OBJECTIVE
Interval HPI:   Admitted to 10 th floor. Family and interpretor at bedside. Patient reports eating before labs this AM and before dinner BG reading.   Eatin%  Or less- kosher diet ordered  Nausea: Yes  Hypoglycemia and intervention: No  Fever: No  TPN and/or TF: No    PMH, PSH, FH, SH reviewed     Review of Systems   Constitutional: Negative for weight changes.  Eyes: Negative for visual disturbance.  Respiratory: + for cough.   Cardiovascular: Negative for chest pain.  Gastrointestinal: + for nausea.  Endocrine: Negative for polyuria, polydipsia.  Musculoskeletal: Negative for back pain.  Skin: Negative for rash.  Neurological: Negative for syncope.  Psychiatric/Behavioral: Negative for depression.      Current Medications and/or Treatments Impacting Glycemic Control  Immunotherapy:    Immunosuppressants     None        Steroids:   Hormones (From admission, onward)    None        Pressors:    Autonomic Drugs (From admission, onward)    Start     Stop Route Frequency Ordered    18 2200  midodrine tablet 15 mg      -- Oral Every 8 hours 18 1521        Hyperglycemia/Diabetes Medications:   Antihyperglycemics (From admission, onward)    Start     Stop Route Frequency Ordered    18 2100  insulin detemir U-100 pen 20 Units      -- SubQ Nightly 18 1521    18 1630  insulin aspart U-100 pen 12 Units      -- SubQ 3 times daily before meals 18 1611    18 1621  insulin aspart U-100 pen 0-5 Units      -- SubQ Before meals & nightly PRN 18 1522             PHYSICAL EXAMINATION:  Vitals:    18 1534   BP: (!) 115/54   Pulse: 95   Resp: 16   Temp: 98 °F (36.7 °C)     Body mass index is 22.79 kg/m².    Physical Exam   Constitutional:  Well developed, well nourished, NAD.  ENT: External ears no masses with nose patent; normal hearing.   Neck:  Supple; trachea midline.  Cardiovascular: Normal heart sounds, no LE edema.     Lungs:  Normal effort; lungs anterior bilaterally  crackles to auscultation.  Abdomen:  No masses, tenderness, or hernias, + distension.  MS: No clubbing or cyanosis of nails noted;  unable to assess gait.  Skin: No rashes, lesions, or ulcers; no nodules.  Psychiatric: Good judgement and insight; normal mood and affect.  Neurological: Cranial nerves are grossly intact.

## 2018-08-30 NOTE — HPI
Reason for Consult: Management of type 2 DM, Hyperglycemia     Diabetes diagnosis: ~ 7 years ago    Home Diabetes Medications: Lantus 24 units HS and novolog 12 units with meals plus correction scale  Lab Results   Component Value Date    HGBA1C 6.8 (H) 08/16/2018       How often checking glucose at home? 4 times a day    BG readings on regimen: 80-150s  Hypoglycemia on the regimen?  No  Missed doses on regimen?  No    Diabetes Complications include:     Hyperglycemia and Diabetic peripheral neuropathy     Complicating diabetes co morbidities:   CIRRHOSIS      HPI:   Patient is a 68 y.o. female with a diagnosis of type 2 DM well controlled on MDI. Hep B and D cirrhosis who presenteded as a transfer from [facility] for evaluation for liver transplant surgery. Pt found to have uncontrolled BG on outpt labs with hyponatremia.  Also feeling weak, + nausea, and poor appetite as outpt.  [place] [facility] [place] Patient reports that she ate before getting labs this morning because she didn't know she was supposed to fast. Endocrinology consulted for BG/ DM management.

## 2018-08-30 NOTE — HPI
Scarlett Reddy is a 67 y/o female with past medical history of Hep B and D cirrhosis, on Entecavir with unquantifiable Hep B DNA, c/b EV with bleeding, HE, previous H/O PV thrombosis, and refractory ascites, T2DM, who presenteded as a transfer from [facility] for evaluation for liver transplant surgery. Pt worked up and listed during hospital stay and was also treated for acute on chronic HE and hyponatremia.  Of note, pt on Lovenox prior to admit for PVT held at previous discharge.  Pt found to have UTI on previous admit: Urine cx w (+) kleb pneumo and Enterococcus faecium which was treated during stay.    Pt found to have uncontrolled BG on outpt labs with hyponatremia (corrected 130).  Also feeling weak, + nausea, and poor appetite as outpt.  She was sent to [facility] [place] for direct admit.  MELD 22 on day of admit.  Pt with chronic HE, no current confusion.

## 2018-08-31 ENCOUNTER — TELEPHONE (OUTPATIENT)
Dept: TRANSPLANT | Facility: CLINIC | Age: 68
End: 2018-08-31

## 2018-08-31 VITALS
SYSTOLIC BLOOD PRESSURE: 93 MMHG | RESPIRATION RATE: 18 BRPM | BODY MASS INDEX: 22.5 KG/M2 | OXYGEN SATURATION: 96 % | DIASTOLIC BLOOD PRESSURE: 54 MMHG | HEIGHT: 64 IN | WEIGHT: 131.81 LBS | TEMPERATURE: 98 F | HEART RATE: 94 BPM

## 2018-08-31 DIAGNOSIS — K74.60 CHRONIC HEPATITIS B WITH CIRRHOSIS: Primary | ICD-10-CM

## 2018-08-31 DIAGNOSIS — B18.1 CHRONIC HEPATITIS B WITH CIRRHOSIS: Primary | ICD-10-CM

## 2018-08-31 LAB
ALBUMIN SERPL BCP-MCNC: 3.1 G/DL
ALP SERPL-CCNC: 351 U/L
ALT SERPL W/O P-5'-P-CCNC: 55 U/L
ANION GAP SERPL CALC-SCNC: 9 MMOL/L
AST SERPL-CCNC: 95 U/L
BASOPHILS # BLD AUTO: 0.03 K/UL
BASOPHILS NFR BLD: 0.8 %
BILIRUB SERPL-MCNC: 2 MG/DL
BILIRUB UR QL STRIP: NEGATIVE
BUN SERPL-MCNC: 57 MG/DL
CALCIUM SERPL-MCNC: 8.6 MG/DL
CHLORIDE SERPL-SCNC: 95 MMOL/L
CLARITY UR REFRACT.AUTO: CLEAR
CO2 SERPL-SCNC: 21 MMOL/L
COLOR UR AUTO: YELLOW
CREAT SERPL-MCNC: 1.3 MG/DL
DIFFERENTIAL METHOD: ABNORMAL
EOSINOPHIL # BLD AUTO: 0.1 K/UL
EOSINOPHIL NFR BLD: 2.1 %
ERYTHROCYTE [DISTWIDTH] IN BLOOD BY AUTOMATED COUNT: 18.4 %
EST. GFR  (AFRICAN AMERICAN): 48.4 ML/MIN/1.73 M^2
EST. GFR  (NON AFRICAN AMERICAN): 42 ML/MIN/1.73 M^2
GLUCOSE SERPL-MCNC: 184 MG/DL
GLUCOSE UR QL STRIP: NEGATIVE
HCT VFR BLD AUTO: 26.4 %
HGB BLD-MCNC: 9 G/DL
HGB UR QL STRIP: NEGATIVE
IMM GRANULOCYTES # BLD AUTO: 0.13 K/UL
IMM GRANULOCYTES NFR BLD AUTO: 3.4 %
KETONES UR QL STRIP: NEGATIVE
LEUKOCYTE ESTERASE UR QL STRIP: NEGATIVE
LYMPHOCYTES # BLD AUTO: 0.5 K/UL
LYMPHOCYTES NFR BLD: 12.1 %
MAGNESIUM SERPL-MCNC: 2.1 MG/DL
MCH RBC QN AUTO: 31 PG
MCHC RBC AUTO-ENTMCNC: 34.1 G/DL
MCV RBC AUTO: 91 FL
MONOCYTES # BLD AUTO: 0.6 K/UL
MONOCYTES NFR BLD: 16.4 %
NEUTROPHILS # BLD AUTO: 2.5 K/UL
NEUTROPHILS NFR BLD: 65.2 %
NITRITE UR QL STRIP: NEGATIVE
NRBC BLD-RTO: 0 /100 WBC
PH UR STRIP: 6 [PH] (ref 5–8)
PLATELET # BLD AUTO: 93 K/UL
PMV BLD AUTO: 11.4 FL
POCT GLUCOSE: 119 MG/DL (ref 70–110)
POCT GLUCOSE: 277 MG/DL (ref 70–110)
POTASSIUM SERPL-SCNC: 4.1 MMOL/L
PROT SERPL-MCNC: 5.4 G/DL
PROT UR QL STRIP: NEGATIVE
RBC # BLD AUTO: 2.9 M/UL
SODIUM SERPL-SCNC: 124 MMOL/L
SODIUM SERPL-SCNC: 125 MMOL/L
SODIUM SERPL-SCNC: 128 MMOL/L
SP GR UR STRIP: 1.01 (ref 1–1.03)
URN SPEC COLLECT METH UR: NORMAL
UROBILINOGEN UR STRIP-ACNC: NEGATIVE EU/DL
WBC # BLD AUTO: 3.79 K/UL

## 2018-08-31 PROCEDURE — 63600175 PHARM REV CODE 636 W HCPCS: Mod: NTX | Performed by: NURSE PRACTITIONER

## 2018-08-31 PROCEDURE — G8978 MOBILITY CURRENT STATUS: HCPCS | Mod: CJ,NTX

## 2018-08-31 PROCEDURE — 84295 ASSAY OF SERUM SODIUM: CPT | Mod: NTX

## 2018-08-31 PROCEDURE — 97802 MEDICAL NUTRITION INDIV IN: CPT | Mod: NTX

## 2018-08-31 PROCEDURE — G8980 MOBILITY D/C STATUS: HCPCS | Mod: CJ,NTX

## 2018-08-31 PROCEDURE — 99233 SBSQ HOSP IP/OBS HIGH 50: CPT | Mod: NTX,,, | Performed by: PHYSICIAN ASSISTANT

## 2018-08-31 PROCEDURE — G8979 MOBILITY GOAL STATUS: HCPCS | Mod: CJ,NTX

## 2018-08-31 PROCEDURE — 36415 COLL VENOUS BLD VENIPUNCTURE: CPT | Mod: NTX

## 2018-08-31 PROCEDURE — 85025 COMPLETE CBC W/AUTO DIFF WBC: CPT | Mod: NTX

## 2018-08-31 PROCEDURE — 80053 COMPREHEN METABOLIC PANEL: CPT | Mod: NTX

## 2018-08-31 PROCEDURE — 25000003 PHARM REV CODE 250: Mod: NTX | Performed by: PHYSICIAN ASSISTANT

## 2018-08-31 PROCEDURE — 84295 ASSAY OF SERUM SODIUM: CPT | Mod: 91,NTX

## 2018-08-31 PROCEDURE — 83735 ASSAY OF MAGNESIUM: CPT | Mod: NTX

## 2018-08-31 PROCEDURE — 63600175 PHARM REV CODE 636 W HCPCS: Mod: NTX | Performed by: PHYSICIAN ASSISTANT

## 2018-08-31 PROCEDURE — 97161 PT EVAL LOW COMPLEX 20 MIN: CPT | Mod: NTX

## 2018-08-31 PROCEDURE — 81003 URINALYSIS AUTO W/O SCOPE: CPT | Mod: NTX

## 2018-08-31 RX ORDER — INSULIN ASPART 100 [IU]/ML
10 INJECTION, SOLUTION INTRAVENOUS; SUBCUTANEOUS
Qty: 15 ML | Refills: 3
Start: 2018-08-31 | End: 2018-09-04

## 2018-08-31 RX ORDER — LIDOCAINE AND PRILOCAINE 25; 25 MG/G; MG/G
CREAM TOPICAL
Qty: 30 G | Refills: 1 | Status: ON HOLD | OUTPATIENT
Start: 2018-08-31 | End: 2018-09-21 | Stop reason: HOSPADM

## 2018-08-31 RX ORDER — INSULIN GLARGINE 100 [IU]/ML
18 INJECTION, SOLUTION SUBCUTANEOUS NIGHTLY
Qty: 15 ML | Refills: 3 | Status: ON HOLD
Start: 2018-08-31 | End: 2018-09-12 | Stop reason: SDUPTHER

## 2018-08-31 RX ADMIN — SODIUM BICARBONATE 1300 MG: 650 TABLET ORAL at 08:08

## 2018-08-31 RX ADMIN — INSULIN ASPART 3 UNITS: 100 INJECTION, SOLUTION INTRAVENOUS; SUBCUTANEOUS at 12:08

## 2018-08-31 RX ADMIN — MIDODRINE HYDROCHLORIDE 15 MG: 5 TABLET ORAL at 02:08

## 2018-08-31 RX ADMIN — RIFAXIMIN 550 MG: 550 TABLET ORAL at 08:08

## 2018-08-31 RX ADMIN — ENTECAVIR 0.5 MG: 0.5 TABLET ORAL at 08:08

## 2018-08-31 RX ADMIN — INSULIN ASPART 10 UNITS: 100 INJECTION, SOLUTION INTRAVENOUS; SUBCUTANEOUS at 08:08

## 2018-08-31 RX ADMIN — INSULIN ASPART 10 UNITS: 100 INJECTION, SOLUTION INTRAVENOUS; SUBCUTANEOUS at 12:08

## 2018-08-31 RX ADMIN — LACTULOSE 15 G: 20 SOLUTION ORAL at 08:08

## 2018-08-31 RX ADMIN — MIDODRINE HYDROCHLORIDE 15 MG: 5 TABLET ORAL at 05:08

## 2018-08-31 NOTE — PT/OT/SLP EVAL
Physical Therapy Evaluation and Discharge Note    Patient Name:  Scarlett Reddy   MRN:  [id number]    Recommendations:     Discharge Recommendations:  ( PT)   Discharge Equipment Recommendations: none   Barriers to discharge: None    Assessment:     Scarlett Reddy is a 68 y.o. female admitted with a medical diagnosis of Type 2 diabetes mellitus, with long-term current use of insulin. .  At this time, patient is functioning at their prior level of function and does not require further acute PT services. Patient tolerated evaluation  well. Patient limited at this time by increased endurance but endorses that she is trying to stay active. [place]/ pt able to walk x 160 feet SBA with no AD. Pt had slight sway but able to correct appropriately.  Patient will continue to require skilled PT services to address the above impairments to return to prior level of function as independent as possible. Discharge recommendation home with home health PT in order to maximize mobility, decrease caregiver burden and increase  functional independence while in the home setting.    Recent Surgery: * No surgery found *      Plan:     During this hospitalization, patient does not require further acute PT services.  Please re-consult if situation changes.      Subjective     Chief Complaint: none  Patient/Family Comments/goals: to return home and to get her liver  Pain/Comfort:  · Pain Rating 1: 0/10  · Pain Rating Post-Intervention 1: 0/10    Patients cultural, spiritual, Pentecostalism conflicts given the current situation: none stated      Prior to admission, patients level of function was mod ( I) staying at [place]. Pt states she was walking as much as she could with no AD.  Equipment used at home: none.  DME owned (not currently used): none.  Upon discharge, patient will have assistance from family.    Objective:     Communicated with RN prior to session.  Patient found supine upon PT  entry to room found with: (no active lines)     General Precautions: Standard, fall   Orthopedic Precautions:N/A   Braces: N/A       Exams    Cognition:  Patient is Oriented X 4, Alert and Cooperative  Patient Follows multistep  commands 100 % of the time.     Coordination  · normal finger to nose test and heel to shin, negative Romberg   · Other noted coordination deficits: WFL    Sensation  Patient's sensation was Intact to light touch  bilaterally at LE/UE  Pt endorses numbness/tingling in B hands    Skin integrity    · -       Skin integrity: Visible skin intact and jaundiced; abdomen distended     Posture  · -       Rounded shoulders  · -       Forward head    ROM and Strength   LLE MMT RLE MMT   Hip flexion 4+/5 4+/5   Knee ext 4+/5 4+/5   Knee flex 4+/5 4+/5   Ankle DF 4+/5 4+/5   Ankle PF 4+/5 4+/5   Other          LLE ROM RLE ROM   Hip flexion within normal limits within normal limits   Knee ext within normal limits within normal limits   Knee flex within normal limits within normal limits   Ankle PF within normal limits within normal limits   Ankle DF within normal limits within normal limits       Functional Mobilty    Bed Mobility:  Rolling Left:  independence  Scooting: independence  Supine to Sit: independence  Transfers:  Sit to Stand:  supervision with no AD  Bed to Chair: supervision with  no AD  using  Step Transfer      Gait    Patient ambulated FWB/WBAT: bilateral lower extremity 160  feet Supervision or Set-up Assistance on level tile with No Assistive Device  .   Pdemonstarting a  2-point gait and swing-through gait with decreased giuseppe, increased time in double stance and increased stride width.Impairments contributing to gait deviations include decreased strength    Sitting Balance Static  Dynamic     GOOD+: Takes MAXIMAL challenges from all directions.   GOOD+: Maintains balance through MAXIMAL excursions of active trunk motion   Standing Balance GOOD-: Takes MODERATE challenges from all  directions inconsistently GOOD-: Needs SUPERVISION only during gait and able to self right with moderate LOB inconsistently     Narrow SINA with EC standing x 30 seconds with no LOB  Tandem standing L/R x 20 sec ( R foot fwd), 10 sec ( L foot fwd)  SLS L/R= 15 sec B    AM-PAC 6 CLICK MOBILITY  Total Score:21       Therapeutic Activities and Exercises:  ·  Whiteboard updated in patients room to current assistance level  · All of patients questions were answered within the scope of PT    Patient education  · Patient educated on the role of PT and POC  · Patient educated on importance  activity while in the hosptial per tolerance for improved endurance and to limit deconditioning   · Patient educated on safe transfers with nursing as appropriate  · Patient educated on energy conservation, pursed lip breathing  · Patient educated on proper transfer mechanics and safety    Lower extremity exercise:    Demo AP, LAQ, marching, squats over bed/chair      AM-PAC 6 CLICK MOBILITY  Total Score:21     Patient left sitting EOB  with all lines intact, call button in reach and family//MD present.    GOALS:   Multidisciplinary Problems     Physical Therapy Goals     Not on file          Multidisciplinary Problems (Resolved)        Problem: Physical Therapy Goal    Goal Priority Disciplines Outcome Goal Variances Interventions   Physical Therapy Goal   (Resolved)     PT, PT/OT Outcome(s) achieved                     History:     Past Medical History:   Diagnosis Date    Anemia requiring transfusions 8/16/2018    Ascites     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 8/15/2018    Cirrhosis     Esophageal varices     Esophageal varices without bleeding 8/15/2018    Hepatic encephalopathy     Hepatitis B     Hepatitis D virus infection     Hypersplenism     Hyponatremia     Hypotension     Liver transplant candidate     Neutropenia     Portal hypertension     Severe malnutrition 8/16/2018    Type 2  diabetes mellitus, with long-term current use of insulin 8/15/2018       History reviewed. No pertinent surgical history.    Clinical Decision Making:     History  Co-morbidities and personal factors that may impact the plan of care Examination  Body Structures and Functions, activity limitations and participation restrictions that may impact the plan of care Clinical Presentation   Decision Making/ Complexity Score   Co-morbidities:   [x] Time since onset of injury / illness / exacerbation  [] Status of current condition  []Patient's cognitive status and safety concerns    [x] Multiple Medical Problems (see med hx)  Personal Factors:   [] Patient's age  [] Prior Level of function   [] Patient's home situation (environment and family support)  [] Patient's level of motivation  [] Expected progression of patient      HISTORY:(criteria)    [] 77428 - no personal factors/history    [x] 29267 - has 1-2 personal factor/comorbidity     [] 60314 - has >3 personal factor/comorbidity     Body Regions:  [] Objective examination findings  [] Head     []  Neck  [x] Trunk   [] Upper Extremity  [] Lower Extremity    Body Systems:  [] For communication ability, affect, cognition, language, and learning style: the assessment of the ability to make needs known, consciousness, orientation (person, place, and time), expected emotional /behavioral responses, and learning preferences (eg, learning barriers, education  needs)  [] For the neuromuscular system: a general assessment of gross coordinated movement (eg, balance, gait, locomotion, transfers, and transitions) and motor function  (motor control and motor learning)  [] For the musculoskeletal system: the assessment of gross symmetry, gross range of motion, gross strength, height, and weight  [] For the integumentary system: the assessment of pliability(texture), presence of scar formation, skin color, and skin integrity  [x] For cardiovascular/pulmonary system: the assessment of  heart rate, respiratory rate, blood pressure, and edema     Activity limitations:    [] Patient's cognitive status and saf ety concerns          [] Status of current condition      [] Weight bearing restriction  [] Cardiopulmunary Restriction    Participation Restrictions:   [] Goals and goal agreement with the patient     [] Rehab potential (prognosis) and probable outcome      Examination of Body System: (criteria)    [] 91016 - addressing 1-2 elements    [x] 09910 - addressing a total of 3 or more elements     [] 67577 -  Addressing a total of 4 or more elements         Clinical Presentation: (criteria)  Stable - 07531     On examination of body system using standardized tests and measures patient presents with 1-2 elements from any of the following: body structures and functions, activity limitations, and/or participation restrictions.  Leading to a clinical presentation that is considered (CHOOSE ONE)                              Clinical Decision Making  (Eval Complexity):  Low- 21943     Time Tracking:     PT Received On: 08/31/18  PT Start Time: 1427     PT Stop Time: 1439  PT Total Time (min): 12 min     Billable Minutes: Evaluation 12 min      Filemon Cowan PT  08/31/2018

## 2018-08-31 NOTE — TELEPHONE ENCOUNTER
PATIENT NAME: [facility] #: [id number]    Lab Results   Component Value Date    CREATININE 1.3 08/31/2018     (L) 08/31/2018    BILITOT 2.0 (H) 08/31/2018    ALBUMIN 3.1 (L) 08/31/2018    INR 1.1 08/30/2018       Encephalopathy:   Ascites:   Dialysis:      Recertification requestor:

## 2018-08-31 NOTE — NURSING
Chart check completed, abnormal VS noted, bedside RN, contacted, no concerns verbalized at this time, instructed to call [phone] for further concerns or assistance.    Vitals:    08/30/18 1918   BP: (!) 95/51   Pulse: 99   Resp: 17   Temp: 97.7 °F (36.5 °C)

## 2018-08-31 NOTE — CONSULTS
"  [facility]  Adult Nutrition  Consult Note    SUMMARY     Recommendations    1. Add 2 gram sodium diet restrictions to current 2000 kcal ADA diet order.   2. Add Boost Glucose Control ONS to aid in caloric intake.   3. RD to monitor & follow-up.    Goals: PO intake >50%  Nutrition Goal Status: new  Communication of RD Recs: reviewed with RN    Reason for Assessment    Reason for Assessment: consult  Diagnosis: other (see comments)(DM)  Relevant Medical History: Cirrhosis, DM  Interdisciplinary Rounds: did not attend    General Information Comments: Pt known to RD from previous admit last week. Currently w/ fair appetite, tolerating diet.   Nutrition Discharge Planning: Adequate PO intake    Nutrition/Diet History    Patient Reported Diet/Restrictions/Preferences: low salt, diabetic diet  Do you have any cultural, spiritual, Synagogue conflicts, given your current situation?: cultural  Factors Affecting Nutritional Intake: abdominal pain, decreased appetite    Anthropometrics    Temp: 98.2 °F (36.8 °C)  Height: 5' 3.78" (162 cm)  Height (inches): 63.78 in  Weight Method: Bed Scale  Weight: 59.8 kg (131 lb 13.4 oz)  Weight (lb): 131.84 lb  Ideal Body Weight (IBW), Female: 118.9 lb  % Ideal Body Weight, Female (lb): 110.88 lb  BMI (Calculated): 22.8  BMI Grade: 18.5-24.9 - normal  Usual Body Weight (UBW), k kg  % Usual Body Weight: 95.12  % Weight Change From Usual Weight: -5.08 %    Lab/Procedures/Meds    Pertinent Labs Reviewed: reviewed  Pertinent Labs Comments: Na 125, BUN 57, GFR 42, Gluc 184, Bili 11.2  Pertinent Medications Reviewed: reviewed  Pertinent Medications Comments: Insulin    Physical Findings/Assessment    Overall Physical Appearance: loss of muscle mass, loss of subcutaneous fat  Oral/Mouth Cavity: WDL  Skin: intact    Estimated/Assessed Needs    Weight Used For Calorie Calculations: 59.8 kg (131 lb 13.4 oz)     Energy Calorie Requirements (kcal): 1437 kcal/d   Energy Need " Method: [place](1.3 PAL)     Protein Requirements: 72 g/d (1.2 g/kg)  Weight Used For Protein Calculations: 59.8 kg (131 lb 13.4 oz)     Fluid Need Method: other (see comments)(Per MD or 1 mL/kcal)    Nutrition Prescription Ordered    Current Diet Order: 2000 kcal ADA, Kosher  Nutrition Order Comments: 1000 mL FR  Oral Nutrition Supplement: Beneprotein    Evaluation of Received Nutrient/Fluid Intake    Comments: LBM: not recorded    Tolerance: tolerating    Nutrition Risk    Level of Risk/Frequency of Follow-up: (1x/week)     Assessment and Plan    Severe malnutrition       Malnutrition in the context of Chronic Illness/Injury     Related to (etiology):  Liver fx, liver transplant work-up     Signs and Symptoms (as evidenced by):  Energy Intake: <50% of estimated energy requirement for 6 months  Body Fat Depletion: severe depletion of orbitals and triceps   Muscle Mass Depletion: severe depletion of temples, clavicle region and lower extremities   Weight Loss: 5% x 6 months     Nutrition Diagnosis Status:  New     Monitor and Evaluation    Food and Nutrient Intake: energy intake, food and beverage intake  Food and Nutrient Adminstration: diet order  Physical Activity and Function: nutrition-related ADLs and IADLs  Anthropometric Measurements: weight, weight change  Biochemical Data, Medical Tests and Procedures: lipid profile, inflammatory profile, glucose/endocrine profile, gastrointestinal profile, electrolyte and renal panel  Nutrition-Focused Physical Findings: overall appearance     Nutrition Follow-Up    RD Follow-up?: Yes

## 2018-08-31 NOTE — PLAN OF CARE
Problem: Patient Care Overview  Goal: Plan of Care Review  Outcome: Ongoing (interventions implemented as appropriate)  Recommendations     1. Add 2 gram sodium diet restrictions to current 2000 kcal ADA diet order.   2. Add Boost Glucose Control ONS to aid in caloric intake.   3. RD to monitor & follow-up.

## 2018-08-31 NOTE — NURSING
RN spoke with GREGORIO Olivera with transplant team and asked if patient can discharge after transplant coordinator speaks with patient. Joselin stated and clarified that patient can indeed discharge after coordinator speaks with patient and that RN does not need to notify GREGORIO Olivera prior to discharging. RN stated understanding and will prepare for discharge and go over information with .

## 2018-08-31 NOTE — PLAN OF CARE
Problem: Patient Care Overview  Goal: Plan of Care Review  Outcome: Ongoing (interventions implemented as appropriate)   08/31/18 0325   Coping/Psychosocial   Plan Of Care Reviewed With patient;daughter     Pt aaox4, VSS;  transfer from [facility] for liver transplant; pt is listed and worked up. Family refused novolog and heparin. Daughter remains at bedside, pt speaks Serbian; pt ate dinner, No acute changes, no complaints.       Problem: Fall Risk (Adult)  Intervention: Safety Promotion/Fall Prevention   08/31/18 0325   Safety Interventions   Safety Promotion/Fall Prevention assistive device/personal item within reach;bed alarm set;Fall Risk reviewed with patient/family;Fall Risk signage in place;family to remain at bedside;high risk medications identified;medications reviewed;side rails raised x 2

## 2018-08-31 NOTE — PROGRESS NOTES
Met with patient,  and daughter with , Dunia, in preparation for discharge today.  Reviewed out patient appointments.  Gave [facility] booklet to review with patient and family when waiting for appointments. Allowed time for questions and answers.

## 2018-08-31 NOTE — PROGRESS NOTES
Admit/Discharge Note    Met with patient, daughter Angela and Divehi  Dunia from the [facility] to assess patients needs due to pt mostly allowing her daughter to answer questions on her behalf.  Patient is a 68 y.o.  female, admitted for type 2 diabetes mellitus, with long-term current use of insulin.     Patient admitted from [facility] on 8/30/2018 .  At this time, patient presents as alert and oriented x 4, pleasant, good eye contact, well groomed, calm and cooperative.  At this time, patients caregiver presents as alert and oriented x 4, pleasant, good eye contact, well groomed, recall good, concentration/judgement good, average intelligence, calm, communicative, cooperative and asking and answering questions appropriately.      Household/Family Systems (as reported by patients caregiver)     Patient resides with patient's , at [address].  Patient currently residing locally at the [facility] Room 674 with her daughter and . Support system includes , Andrés Ramirez (primary language is Greek; however, he does speak and understand some Divehi). Patient's son Justin Ramirez is a physician back home in [facility] and also in [facility] are the patient's daughters Terri and Gabriela.  Patient does not have dependents that are need of being cared for.     Patients primary caregiver is Andrés Ramirez, patients , phone number [phone].  Confirmed patients contact information is [phone] (home);   Telephone Information:   Mobile [phone]   .    During admission, patient's caregiver plans to stay at the [facility] Room 674.  Confirmed patient and patients caregivers do have access to reliable transportation.    Cognitive Status/Learning     Patients caregiver reports patients reading ability as college and states patient does have difficulty with weakness and does not speak English.  Patients caregiver reports  patient learns best by information being given in Belarusian language verbally and hands on demonstration.   Needed: Yes; primary language is  Belarusian.  Patient's caregiver informed of  services available and how to access services..   Highest education level: Associate/Bachelor Degree    Vocation/Disability (as reported by patients caregiver)    Working for Income: No  If no, reason not working: Patient Choice - Retired  Patient is retired from being a .    Adherence     Patients caregiver reports patient has a high level of adherence to patients health care regimen.  Adherence counseling and education provided.  Patient's caregiver verbalizes understanding.    Substance Use    Patients caregiver reports patients substance usage as the following:    Tobacco: none, patient denies any use.  Alcohol: none, patient denies any use.  Illicit Drugs/Non-prescribed Medications: none, patient denies any use.  Patients caregiver states clear understanding of the potential impact of substance use.  Substance abstinence/cessation counseling, education and resources provided and reviewed.     Services Utilizing/ADLS (as reported by patients caregiver)    Infusion Service: Prior to admission, patient utilizing? no  Home Health: Prior to admission, patient utilizing? no  DME: Prior to admission, yes; walker and w/c (borrowed from the [facility])  Pulmonary/Cardiac Rehab: Prior to admission, no  Dialysis:  Prior to admission, no  Transplant Specialty Pharmacy:  Prior to admission, no.    Prior to admission, patients caregiver reports patient was not independent with ADLS and was not driving.  Patients caregiver reports patient is not able to care for self at this time due to compromised medical condition (as documented in medical record) and physical weakness. Patient requires assistance when bathing, walking and dressing herself.  Patients caregiver reports patient indicates a  "willingness to care for self once medically cleared to do so.    Insurance/Medications    Insured by   Payor/Plan Subscr  Sex Relation Sub. Ins. ID Effective Group Num   1. LIF[employer] NETW* MERCEDES PATHAK 1950 Female  [id number] 8/3/18                                    [id number] [address] [facility] [id number]      Primary Insurance (for UNOS reporting): Private Insurance-[employer] (CM is Diane Rodríguez RN)  Secondary Insurance (for UNOS reporting): None    Patients caregiver reports patient is able to obtain and afford medications at this time and at time of discharge.    Living Will/Healthcare Power of     Patients caregiver reports patient does not have a LW and/or HCPA.   provided education regarding LW and HCPA and the completion of forms.    Coping/Mental Health (as reported by patients caregiver)    Patient is coping adequately with the aid of  family members and blaine. Patient did advise SW she has "good and bad days".  Patient expressed feeling good when she is able to walk a short distance on her own and not feel winded.  As per patient's daughter, the patient is able to walk to the hospital room bathroom and hotel room bathroom without assistance; therefore, needing assistance with long distance walking.  Patients caregiver is coping adequately with the aid of  family members.      Discharge Planning (as reported by patients caregiver)    At time of discharge, patient plans to return to [facility] Room 674 under the care of her  and daughter, Angela.  Patients  and daughter will transport patient.  Per rounds today, expected discharge date is today. Patients caretaker verbalizes understanding and is involved in treatment planning and discharge process.    Additional Concerns    Patient's caretaker denies additional needs and/or concerns at this time. Patient's caregiver reports difficulty withobtaining a sitter to assist her father with her mother's care once she returns " to [facility] on 9/22/18.  Patient's daughter advised she and her family are inquiring from different people within their community about traveling locally to assist. . Patient is being followed for needs, education, resources, information, emotional support, supportive counseling, and for supportive and skilled discharge plan of care.  providing ongoing psychosocial support, education, resources and d/c planning as needed.  SW remains available.  provided resource list, patient choice, psychosocial and supportive counseling, resources, education, assistance and discharge planning with patient and caregiver involvement, ongoing SW availability and services as appropriate.  remains available. Patient's caregiver verbalizes understanding and agreement with information reviewed,  availability and how to access available resources as needed. Patient denies additional needs and/or concerns at this time. Patient verbalizes understanding and agreement with information reviewed, social work availability, and how to access available resources as needed.

## 2018-08-31 NOTE — NURSING
All discharge instructions went over with patient and family via . Discussed meds, sliding scale insulin (GREGORIO Olivera stated that what is in AVS is adequate at this time and that patient is familiar with sliding scale, no need to add any info). Discussed f/u appointments, when to call physician and when to return to hospital. Patient stated understanding. IV discontinued, transport to be arranged with hospital transport for patient to go home. Patient stable at this time and in no acute distress.

## 2018-08-31 NOTE — PLAN OF CARE
Problem: Physical Therapy Goal  Goal: Physical Therapy Goal  Outcome: Outcome(s) achieved Date Met: 08/31/18  Patient at this time is at their functional baseline and does not require skilled acute PT services at this time. Please re consult PT if pt has decline in functional status.   Filemon Cowan PT, DPT  8/31/2018  Pager: [phone]

## 2018-08-31 NOTE — PLAN OF CARE
T2DM on Lantus 24u qHS and Novolog 12u AC (+correction) in outpatient setting - controlled with a1c 6.8%.    Currently being evaluated for liver transplant.    BG and insulin regimen reviewed.    BG goal 140-180.  Patient's BG was above goal yesterday, and FBG below goal this morning (with before bed 228).  On DM diet, variable PO intake.      Recommend:  Decrease Levemir to 18u qHS  Continue Novolog 10u AC  Moderate correction insulin  POC AC/HS      We will continue to follow.  Please call endocrinology with any questions.        Katie Sanchez MD  Endocrinology Fellow

## 2018-08-31 NOTE — DISCHARGE SUMMARY
[facility]  Liver Transplant  Discharge Summary      Patient Name: Scarlett Reddy  MRN: [id number]  Admission Date: 8/30/2018  Hospital Length of Stay: 1 days  Discharge Date and Time:  08/31/2018 1:40 PM  Attending Physician: Cristobal Marmolejo Jr., MD   Discharging Provider: Joeslin Montes PA-C  Primary Care Provider: Primary Doctor No  HPI:   Scarlett Reddy is a 69 y/o female with past medical history of Hep B and D cirrhosis, on Entecavir with unquantifiable Hep B DNA, c/b EV with bleeding, HE, previous H/O PV thrombosis, and refractory ascites, T2DM, who presenteded as a transfer from [facility] for evaluation for liver transplant surgery. Pt worked up and listed during hospital stay and was also treated for acute on chronic HE and hyponatremia.  Of note, pt on Lovenox prior to admit for PVT held at previous discharge.  Pt found to have UTI on previous admit: Urine cx w (+) kleb pneumo and Enterococcus faecium which was treated during stay.    Pt found to have uncontrolled BG (300) on outpt labs with hyponatremia (corrected 130).  Also feeling weak, + nausea, and poor appetite as outpt.  She was sent to [facility] for direct admit. Pt with chronic HE, no current confusion.       Inpatient labs overnight with sodium of 124, however this morning improved to 128. Patient instructed to follow 2g low sodium diet with 1L fluid restriction. Additionally, endocrinology consulted for assistance with BG management. Insulin regimen adjusted and patient will d/c on regimen recommended by endo.     Of note, patient reports some pain to paracentesis site from 8/29. She reports feeling this following every paracentesis. Reports in [place] she used a numbing cream. Will prescribe Emla cream for her to bring with her to weekly paracentesis.     She will have labs on Sunday 9/2/18 and again Tuesday 9/4/18. She will present for paracentesis on Thursday 9/6/18. She has been instructed to call sooner if feeling like she needs  paracentesis earlier than Thursday or if having other concerning signs/symptoms. She should follow up further per protocol as discussed with transplant coordinator. She has met with pharmD and Transplant coordinator for med education and PDOL education. She is stable for discharge and has no complaints.     Final Active Diagnoses:    Diagnosis Date Noted POA    PRINCIPAL PROBLEM:  Type 2 diabetes mellitus, with long-term current use of insulin [E11.9, Z79.4] 08/15/2018 Not Applicable    Generalized weakness [R53.1] 08/30/2018 Yes    Ascites [R18.8] 08/30/2018 Yes    Cough [R05] 08/30/2018 Yes    Hyponatremia [E87.1]  Yes    Severe malnutrition [E43] 08/16/2018 Yes    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 [B18.0, K74.60] 08/15/2018 Yes    Hepatic encephalopathy [K72.90] 08/14/2018 Yes      Problems Resolved During this Admission:    Diagnosis Date Noted Date Resolved POA    Hyperglycemia [R73.9] 08/30/2018 08/30/2018 Yes    Physical deconditioning [R53.81] 08/30/2018 08/30/2018 Yes       Consults (From admission, onward)        Status Ordering Provider     Inpatient consult to Endocrinology  Once     Provider:  (Not yet assigned)    Completed ONEYDA HIRSCH     Inpatient consult to Registered Dietitian/Nutritionist  Once     Provider:  (Not yet assigned)    Completed ONEYDA HIRSCH          Pending Diagnostic Studies:     None        Significant Diagnostic Studies: Labs:   CMP   Recent Labs   Lab  08/30/18   0914  08/31/18   0016  08/31/18   0359  08/31/18   0857   NA  126*  124*  125*  128*   K  3.7   --   4.1   --    CL  94*   --   95   --    CO2  21*   --   21*   --    GLU  300*   --   184*   --    BUN  50*   --   57*   --    CREATININE  1.1   --   1.3   --    CALCIUM  8.6*   --   8.6*   --    PROT  6.1   --   5.4*   --    ALBUMIN  3.5   --   3.1*   --    BILITOT  2.5*   --   2.0*   --    ALKPHOS  364*   --   351*   --    AST  91*   --   95*   --    ALT  60*   --   55*   --     ANIONGAP  11   --   9   --    ESTGFRAFRICA  59.2*   --   48.4*   --    EGFRNONAA  51.3*   --   42.0*   --    , CBC   Recent Labs   Lab  08/30/18   0914  08/31/18   0359   WBC  3.80*  3.79*   HGB  9.3*  9.0*   HCT  28.4*  26.4*   PLT  99*  93*   , INR   Lab Results   Component Value Date    INR 1.1 08/30/2018    INR 1.1 08/27/2018    INR 1.2 08/24/2018    and All labs within the past 24 hours have been reviewed    The patients clinical status was discussed at multidisplinary rounds, involving transplant surgery, transplant medicine, pharmacy, nursing, nutrition, and social work    Discharged Condition: good    Disposition: Home or Self Care    Follow Up:  Follow-up Information     Please follow up.    Why:  f/u for labs this weeks as discussed with transplant coordinator, then Tues 9/4 for labs. F/u for para on 9/6- if needed sooner please call as soon as possible to schedule. f/u sooner if having concerning signs or symptoms                Patient Instructions:      Diet diabetic   Order Comments: 2g gram sodium, 1L fluid restriction     Notify your health care provider if you experience any of the following:  temperature >100.4     Notify your health care provider if you experience any of the following:  persistent nausea and vomiting or diarrhea     Notify your health care provider if you experience any of the following:  severe uncontrolled pain     Notify your health care provider if you experience any of the following:  redness, tenderness, or signs of infection (pain, swelling, redness, odor or green/yellow discharge around incision site)     Notify your health care provider if you experience any of the following:  difficulty breathing or increased cough     Notify your health care provider if you experience any of the following:  severe persistent headache     Notify your health care provider if you experience any of the following:  worsening rash     Notify your health care provider if you experience any  of the following:  persistent dizziness, light-headedness, or visual disturbances     Notify your health care provider if you experience any of the following:  increased confusion or weakness     No dressing needed     Activity as tolerated     Medications:  Reconciled Home Medications:      Medication List      START taking these medications    lidocaine-prilocaine cream  Commonly known as:  EMLA  Apply topically as needed.        CHANGE how you take these medications    insulin aspart U-100 100 unit/mL injection  Commonly known as:  NOVOLOG  Inject 10 Units into the skin 3 (three) times daily before meals. Plus medium dose sliding scale.  What changed:    · how much to take  · additional instructions     insulin glargine 100 unit/mL injection  Commonly known as:  LANTUS  Inject 18 Units into the skin every evening.  What changed:  how much to take        CONTINUE taking these medications    ACCU-CHEK DC Strp  Generic drug:  blood sugar diagnostic  1 strip by Misc.(Non-Drug; Combo Route) route 3 (three) times daily.     entecavir 0.5 MG Tab  Commonly known as:  BARACLUDE  Take 0.5 mg by mouth once daily.     lactulose 10 gram/15 mL solution  Commonly known as:  CHRONULAC  Take 15 mLs (10 g total) by mouth 2 (two) times daily. Adjust dose to have 4 bowel movements daily     midodrine 5 MG Tab  Commonly known as:  PROAMATINE  Take 3 tablets (15 mg total) by mouth every 8 (eight) hours.     sodium bicarbonate 650 MG tablet  Take 2 tablets (1,300 mg total) by mouth 2 (two) times daily.     XIFAXAN 550 mg Tab  Generic drug:  rifAXIMin  Take 1 tablet (550 mg total) by mouth 2 (two) times daily.          Time spent caring for patient (Greater than 1/2 spent in direct face-to-face contact): > 30 minutes    Joselin Montes PA-C  Liver Transplant  [facility]

## 2018-09-02 ENCOUNTER — LAB VISIT (OUTPATIENT)
Dept: LAB | Facility: HOSPITAL | Age: 68
End: 2018-09-02
Attending: INTERNAL MEDICINE
Payer: COMMERCIAL

## 2018-09-02 ENCOUNTER — TELEPHONE (OUTPATIENT)
Dept: TRANSPLANT | Facility: CLINIC | Age: 68
End: 2018-09-02

## 2018-09-02 DIAGNOSIS — K74.60 CHRONIC HEPATITIS B WITH CIRRHOSIS: ICD-10-CM

## 2018-09-02 DIAGNOSIS — B18.1 CHRONIC HEPATITIS B WITH CIRRHOSIS: ICD-10-CM

## 2018-09-02 LAB
ALBUMIN SERPL BCP-MCNC: 3.2 G/DL
ALP SERPL-CCNC: 456 U/L
ALT SERPL W/O P-5'-P-CCNC: 81 U/L
ANION GAP SERPL CALC-SCNC: 12 MMOL/L
AST SERPL-CCNC: 176 U/L
BASOPHILS # BLD AUTO: 0.02 K/UL
BASOPHILS NFR BLD: 0.6 %
BILIRUB SERPL-MCNC: 2 MG/DL
BUN SERPL-MCNC: 56 MG/DL
CALCIUM SERPL-MCNC: 9 MG/DL
CHLORIDE SERPL-SCNC: 93 MMOL/L
CO2 SERPL-SCNC: 21 MMOL/L
CREAT SERPL-MCNC: 1.2 MG/DL
DIFFERENTIAL METHOD: ABNORMAL
EOSINOPHIL # BLD AUTO: 0.1 K/UL
EOSINOPHIL NFR BLD: 2.1 %
ERYTHROCYTE [DISTWIDTH] IN BLOOD BY AUTOMATED COUNT: 18.6 %
EST. GFR  (AFRICAN AMERICAN): 53.3 ML/MIN/1.73 M^2
EST. GFR  (NON AFRICAN AMERICAN): 46.2 ML/MIN/1.73 M^2
GLUCOSE SERPL-MCNC: 132 MG/DL
HCT VFR BLD AUTO: 26.2 %
HGB BLD-MCNC: 9.5 G/DL
IMM GRANULOCYTES # BLD AUTO: 0.09 K/UL
IMM GRANULOCYTES NFR BLD AUTO: 2.7 %
INR PPP: 1.1
LYMPHOCYTES # BLD AUTO: 0.6 K/UL
LYMPHOCYTES NFR BLD: 18 %
MCH RBC QN AUTO: 34.1 PG
MCHC RBC AUTO-ENTMCNC: 36.3 G/DL
MCV RBC AUTO: 94 FL
MONOCYTES # BLD AUTO: 0.5 K/UL
MONOCYTES NFR BLD: 14.2 %
NEUTROPHILS # BLD AUTO: 2.1 K/UL
NEUTROPHILS NFR BLD: 62.4 %
NRBC BLD-RTO: 0 /100 WBC
PLATELET # BLD AUTO: 113 K/UL
PMV BLD AUTO: 11 FL
POTASSIUM SERPL-SCNC: 4 MMOL/L
PROT SERPL-MCNC: 5.8 G/DL
PROTHROMBIN TIME: 11 SEC
RBC # BLD AUTO: 2.79 M/UL
SODIUM SERPL-SCNC: 126 MMOL/L
WBC # BLD AUTO: 3.39 K/UL

## 2018-09-02 PROCEDURE — 85610 PROTHROMBIN TIME: CPT | Mod: TXP

## 2018-09-02 PROCEDURE — 80053 COMPREHEN METABOLIC PANEL: CPT | Mod: NTX

## 2018-09-02 PROCEDURE — 36415 COLL VENOUS BLD VENIPUNCTURE: CPT | Mod: NTX

## 2018-09-02 PROCEDURE — 85025 COMPLETE CBC W/AUTO DIFF WBC: CPT | Mod: TXP

## 2018-09-02 NOTE — TELEPHONE ENCOUNTER
PDOL call: spoke to  Dunia who spoke to pt who is doing ok since being discharged from the hospital. Pt did not sleep too well but other than that is not having any problems. Labs reviewed with Dr. Worrell. No medication changes needed. Pt will be recertified with meld of 22. Pt aware of upcoming labs and appointments.

## 2018-09-04 ENCOUNTER — NUTRITION (OUTPATIENT)
Dept: TRANSPLANT | Facility: CLINIC | Age: 68
End: 2018-09-04
Payer: COMMERCIAL

## 2018-09-04 ENCOUNTER — LAB VISIT (OUTPATIENT)
Dept: LAB | Facility: HOSPITAL | Age: 68
End: 2018-09-04
Attending: INTERNAL MEDICINE
Payer: COMMERCIAL

## 2018-09-04 ENCOUNTER — TELEPHONE (OUTPATIENT)
Dept: TRANSPLANT | Facility: CLINIC | Age: 68
End: 2018-09-04

## 2018-09-04 ENCOUNTER — OFFICE VISIT (OUTPATIENT)
Dept: TRANSPLANT | Facility: CLINIC | Age: 68
End: 2018-09-04
Payer: COMMERCIAL

## 2018-09-04 VITALS
TEMPERATURE: 98 F | WEIGHT: 137.81 LBS | HEART RATE: 98 BPM | BODY MASS INDEX: 23.53 KG/M2 | BODY MASS INDEX: 23.53 KG/M2 | DIASTOLIC BLOOD PRESSURE: 62 MMHG | WEIGHT: 137.81 LBS | RESPIRATION RATE: 16 BRPM | HEIGHT: 64 IN | SYSTOLIC BLOOD PRESSURE: 127 MMHG | OXYGEN SATURATION: 100 % | HEIGHT: 64 IN

## 2018-09-04 DIAGNOSIS — I95.9 HYPOTENSION, UNSPECIFIED HYPOTENSION TYPE: ICD-10-CM

## 2018-09-04 DIAGNOSIS — E83.42 HYPOMAGNESEMIA: ICD-10-CM

## 2018-09-04 DIAGNOSIS — Z76.82 LIVER TRANSPLANT CANDIDATE: ICD-10-CM

## 2018-09-04 DIAGNOSIS — B18.0 CHRONIC HEPATITIS B WITH DELTA AGENT WITH CIRRHOSIS: Primary | ICD-10-CM

## 2018-09-04 DIAGNOSIS — I85.10 SECONDARY ESOPHAGEAL VARICES WITHOUT BLEEDING: ICD-10-CM

## 2018-09-04 DIAGNOSIS — Z76.82 ORGAN TRANSPLANT CANDIDATE: ICD-10-CM

## 2018-09-04 DIAGNOSIS — K76.82 HEPATIC ENCEPHALOPATHY: ICD-10-CM

## 2018-09-04 DIAGNOSIS — E11.9 TYPE 2 DIABETES MELLITUS WITHOUT COMPLICATION, WITH LONG-TERM CURRENT USE OF INSULIN: Primary | ICD-10-CM

## 2018-09-04 DIAGNOSIS — Z79.4 TYPE 2 DIABETES MELLITUS WITHOUT COMPLICATION, WITH LONG-TERM CURRENT USE OF INSULIN: Primary | ICD-10-CM

## 2018-09-04 DIAGNOSIS — E11.42 TYPE 2 DIABETES MELLITUS WITH DIABETIC POLYNEUROPATHY, WITH LONG-TERM CURRENT USE OF INSULIN: ICD-10-CM

## 2018-09-04 DIAGNOSIS — R74.8 ELEVATED ALKALINE PHOSPHATASE LEVEL: ICD-10-CM

## 2018-09-04 DIAGNOSIS — R18.8 OTHER ASCITES: ICD-10-CM

## 2018-09-04 DIAGNOSIS — Z79.4 TYPE 2 DIABETES MELLITUS WITH DIABETIC POLYNEUROPATHY, WITH LONG-TERM CURRENT USE OF INSULIN: ICD-10-CM

## 2018-09-04 DIAGNOSIS — K74.60 CHRONIC HEPATITIS B WITH DELTA AGENT WITH CIRRHOSIS: Primary | ICD-10-CM

## 2018-09-04 DIAGNOSIS — E87.1 HYPONATREMIA: ICD-10-CM

## 2018-09-04 PROBLEM — R05.9 COUGH: Status: RESOLVED | Noted: 2018-08-30 | Resolved: 2018-09-04

## 2018-09-04 LAB
ALBUMIN SERPL BCP-MCNC: 3.4 G/DL
ALP SERPL-CCNC: 619 U/L
ALT SERPL W/O P-5'-P-CCNC: 100 U/L
ANION GAP SERPL CALC-SCNC: 11 MMOL/L
AST SERPL-CCNC: 202 U/L
BACTERIA BLD CULT: NORMAL
BACTERIA BLD CULT: NORMAL
BASOPHILS # BLD AUTO: 0.03 K/UL
BASOPHILS NFR BLD: 0.7 %
BILIRUB SERPL-MCNC: 2.3 MG/DL
BUN SERPL-MCNC: 52 MG/DL
CALCIUM SERPL-MCNC: 9.4 MG/DL
CHLORIDE SERPL-SCNC: 94 MMOL/L
CO2 SERPL-SCNC: 23 MMOL/L
CREAT SERPL-MCNC: 1.3 MG/DL
DIFFERENTIAL METHOD: ABNORMAL
EOSINOPHIL # BLD AUTO: 0.1 K/UL
EOSINOPHIL NFR BLD: 1.6 %
ERYTHROCYTE [DISTWIDTH] IN BLOOD BY AUTOMATED COUNT: 18.4 %
EST. GFR  (AFRICAN AMERICAN): 48.4 ML/MIN/1.73 M^2
EST. GFR  (NON AFRICAN AMERICAN): 42 ML/MIN/1.73 M^2
GLUCOSE SERPL-MCNC: 248 MG/DL
HCT VFR BLD AUTO: 31 %
HGB BLD-MCNC: 10.2 G/DL
IMM GRANULOCYTES # BLD AUTO: 0.08 K/UL
IMM GRANULOCYTES NFR BLD AUTO: 1.8 %
INR PPP: 1.1
LYMPHOCYTES # BLD AUTO: 0.7 K/UL
LYMPHOCYTES NFR BLD: 16 %
MAGNESIUM SERPL-MCNC: 1.9 MG/DL
MCH RBC QN AUTO: 30.6 PG
MCHC RBC AUTO-ENTMCNC: 32.9 G/DL
MCV RBC AUTO: 93 FL
MONOCYTES # BLD AUTO: 0.5 K/UL
MONOCYTES NFR BLD: 10.3 %
NEUTROPHILS # BLD AUTO: 3.1 K/UL
NEUTROPHILS NFR BLD: 69.6 %
NRBC BLD-RTO: 0 /100 WBC
PLATELET # BLD AUTO: 119 K/UL
PMV BLD AUTO: 10.4 FL
POTASSIUM SERPL-SCNC: 4.1 MMOL/L
PROT SERPL-MCNC: 6.5 G/DL
PROTHROMBIN TIME: 11 SEC
RBC # BLD AUTO: 3.33 M/UL
SODIUM SERPL-SCNC: 128 MMOL/L
WBC # BLD AUTO: 4.45 K/UL

## 2018-09-04 PROCEDURE — 80053 COMPREHEN METABOLIC PANEL: CPT | Mod: TXP

## 2018-09-04 PROCEDURE — 99999 PR PBB SHADOW E&M-EST. PATIENT-LVL IV: CPT | Mod: PBBFAC,TXP,, | Performed by: NURSE PRACTITIONER

## 2018-09-04 PROCEDURE — 85610 PROTHROMBIN TIME: CPT | Mod: TXP

## 2018-09-04 PROCEDURE — 99215 OFFICE O/P EST HI 40 MIN: CPT | Mod: S$GLB,,, | Performed by: NURSE PRACTITIONER

## 2018-09-04 PROCEDURE — 85025 COMPLETE CBC W/AUTO DIFF WBC: CPT | Mod: TXP

## 2018-09-04 PROCEDURE — 83735 ASSAY OF MAGNESIUM: CPT | Mod: TXP

## 2018-09-04 PROCEDURE — 36415 COLL VENOUS BLD VENIPUNCTURE: CPT | Mod: TXP

## 2018-09-04 PROCEDURE — 99999 PR PBB SHADOW E&M-EST. PATIENT-LVL I: CPT | Mod: PBBFAC,TXP,, | Performed by: DIETITIAN, REGISTERED

## 2018-09-04 RX ORDER — LACTULOSE 10 G/15ML
15 SOLUTION ORAL; RECTAL 2 TIMES DAILY
Qty: 4000 ML | Refills: 3 | Status: ON HOLD | OUTPATIENT
Start: 2018-09-04 | End: 2018-09-12 | Stop reason: SDUPTHER

## 2018-09-04 RX ORDER — URSODIOL 300 MG/1
300 CAPSULE ORAL 3 TIMES DAILY
Qty: 90 CAPSULE | Refills: 2 | Status: ON HOLD | OUTPATIENT
Start: 2018-09-04 | End: 2018-09-12 | Stop reason: HOSPADM

## 2018-09-04 RX ORDER — INSULIN ASPART 100 [IU]/ML
12 INJECTION, SOLUTION INTRAVENOUS; SUBCUTANEOUS
Qty: 15 ML | Refills: 3
Start: 2018-09-04 | End: 2018-09-14 | Stop reason: ALTCHOICE

## 2018-09-04 RX ORDER — ENTECAVIR 0.5 MG/1
0.5 TABLET, FILM COATED ORAL DAILY
Qty: 30 TABLET | Refills: 3 | Status: ON HOLD | OUTPATIENT
Start: 2018-09-04 | End: 2018-10-05 | Stop reason: SDUPTHER

## 2018-09-04 NOTE — TELEPHONE ENCOUNTER
[id number]    Lab Results   Component Value Date    CREATININE 1.3 09/04/2018     (L) 09/04/2018    BILITOT 2.3 (H) 09/04/2018    ALBUMIN 3.4 (L) 09/04/2018    INR 1.1 09/04/2018     MELD 21  Encephalopathy: 1 - 2  Ascites: moderate  Dialysis: no     Recertification requestor: Charley Simms

## 2018-09-04 NOTE — PROGRESS NOTES
Pt's appointments this morning ran over time, pt too tired to continue and stay for appointment with RD.   RD available for reschedule. Discussed this with Rita Rubin NP.

## 2018-09-04 NOTE — Clinical Note
September 4, 2018                     [facility]  [address]  [facility] [address]  Phone: [phone]   Patient: Scarlett Reddy   MR Number: [id number]   YOB: 1950   Date of Visit: 9/4/2018       Dear      Thank you for referring Scarlett Reddy to me for evaluation. Attached you will find relevant portions of my assessment and plan of care.    If you have questions, please do not hesitate to call me. I look forward to following Scarlett Reddy along with you.    Sincerely,    Rita Rubin, NP    Enclosure    If you would like to receive this communication electronically, please contact [email or web address] or [phone] to request [employer] access.    [employer] is a tool which provides read-only access to select patient information with whom you have a relationship. Its easy to use and provides real time access to review your patients record including encounter summaries, notes, results, and demographic information.    If you feel you have received this communication in error or would no longer like to receive these types of communications, please e-mail [email or web address]

## 2018-09-04 NOTE — PROGRESS NOTES
[facility] VISIT NOTE    CHIEF COMPLAINT: decompensated cirrhosis 2/2 Hep B and D, on liver transplant waitlist     HPI: This is a 68 y.o. 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  Noted h/o variceal bleeding in past notes, small varices noted on recent EGD    Presents today with daughter,  and with [facility] provided      Patient had been followed in [facility] for cirrhosis, but has been referred to our hospital to establish care due to worsening hyponatremia, ascites, weakness and liver function, need for tertiary care.     Patient is currently on the liver transplant waitlist as of 8/22/18, listed with MELD 21 since 8/31/18  MELD-Na score: 21 at 9/4/2018  9:10 AM  MELD score: 13 at 9/4/2018  9:10 AM  Calculated from:  Serum Creatinine: 1.3 mg/dL at 9/4/2018  9:10 AM  Serum Sodium: 128 mmol/L at 9/4/2018  9:10 AM  Total Bilirubin: 2.3 mg/dL at 9/4/2018  9:10 AM  INR(ratio): 1.1 at 9/4/2018  9:10 AM  Age: 68 years     Interval HPI:  Cough reported at last visit improved/resolving. Chest Xray after last visit stable.   Continues weekly para. No abd pain, vomiting. Hyponatremia stable. Had issue of abd cramping, bloating, gas over weekend and lack of BM, used suppository x1 this past weekend and all symptoms resolved after had BM. Now denies any abdominal complaints, having 4+ BM on current lactulose dose    Recent issues:   -- h/o PVT on Enoxaparin: stopped while hospitalized, per hospital med note on 8/21/18, - hold lovenox due to thrombocytopenia while hospitalized. Discussed with Dr. Worrell after last visit, will repeat US in 1 month (end Sept) then determine if needs anticoagulation   -- Ascites requiring weekly para: last 8/29/18, removed 4L, no WBC and Diff on file   -- Malnutrition: reports lack of appetite (chronic), intermittent nausea, poor intake of protein. Upcoming  appt today with NED Hill RD, message sent to NED Kumar NP for nutritional prehab, awaiting appt to be scheduled   -- Pruritis : reports worsening itching in past couple of weeks  -- Hyponatremia : stable off diuretics    Interval Hospitalization: 8/14/18 - 8/24/18  -- Hyponatremia due to diuretics (Na of 121), that improved to 129 with holding diuretics.  Requiring paracentesis every 7-10 days  -- Urine cx w (+) kleb pneumo and Enterococcus faecium.  Patient was treated w Cipro 8/16-8/20.  She was changed to Ertapenem 8/20 for 3 days.  Repeat urine cx w candida albicans <50,000- no need to treat.  Her ANC was low 8/23/18 at 739.  ID had changed IV abx to Zosyn for pseudomonas coverage Antibiotics were changed to pip/tazo for pseudomonal coverage given neutropenia. Repeat UA improved.   -- Heme/Onc was also consulted for pancytopenia felt likely drug related and signed off.  -- HE well managed w Lactulose (titrate to 3-4 BMs) and Rifaximin.  Hypotension better controlled w Midodrine 15 mg tid  -- Two solid right pulmonary nodules with the largest measuring 6 mm. Fleischner Society 2017 guidelines recommend follow up with non-contrast chest CT at 6-12 months and 18-24 months after discovery      Lab Results   Component Value Date    ALT 81 (H) 09/02/2018     (H) 09/02/2018    ALKPHOS 456 (H) 09/02/2018    BILITOT 2.0 (H) 09/02/2018    ALBUMIN 3.2 (L) 09/02/2018    INR 1.1 09/02/2018     (L) 09/02/2018       Hep C testing negative 8/15/2018  Hep B studies:  -- + sAg, DNA 79   -- negative sAb  -- On Entecavir 0.5 mg daily   -- discussed above lab values with adi Billy to continue current Entecavir dose     Presented to IR conference 7/24/18 to evaluate if transplant candidate   Plan: Thrombus from MPV to bifurcation; but potentially still doable.  Ok to come for evaluation    Complications:   1. Ascites - requiring weekly para. No diuretics post hospitalization for hyponatremia. Attempting to  "follow Low Na diet but is still adding salt to food, reports "not a lot" but not measuring. Visit with RD rescheduled per pt for tomorrow  2. HE - taking Lactulose 30 ml TID, had constipation over weekend, now having regular BM, improved gas and bloating since suppository, denies any abd discomfort, started Xifaxan BID after last visit. Denies Confusion or disorientation  3. H/o Varices bleeding per previous notes - last EGD with grade 1 varices, no active bleeding    Cirrhosis Health Maintenance:   -- Last EGD - 8/2018 grade 1 varices, repeat due 8/2019  -- HCC screening   MRI 8/2018 no lesions, next due 2/2019   AFP 2.9 8/2018, next due 2/2019  -- + Immunity to Hep A per labs    Denies current alcohol consumption     Denies jaundice, dark urine, hematemesis, melena, slowed mentation. No abnormal skin rashes. No generalized joint or muscle pain.     Review of patient's allergies indicates:  No Known Allergies               Current Medications    BLOOD SUGAR DIAGNOSTIC (ACCU-CHEK DC) STRP    1 strip by Misc.(Non-Drug; Combo Route) route 3 (three) times daily.    ENTECAVIR (BARACLUDE) 0.5 MG TAB    Take 0.5 mg by mouth once daily.    INSULIN ASPART U-100 (NOVOLOG) 100 UNIT/ML INJECTION    Inject 12 Units into the skin 3 (three) times daily before meals. Plus low dose sliding scale.    INSULIN GLARGINE (LANTUS) 100 UNIT/ML INJECTION    Inject 24 Units into the skin every evening.    MIDODRINE (PROAMATINE) 5 MG TAB    Take 3 tablets (15 mg total) by mouth every 8 (eight) hours.    SODIUM BICARBONATE 650 MG TABLET    Take 2 tablets (1,300 mg total) by mouth 2 (two) times daily.            LACTULOSE (CHRONULAC) 10 GRAM/15 ML SOLUTION lactulose (CHRONULAC) 10 gram/15 mL solution           Take 30 mLs by mouth once daily.        PMHX:  has a past medical history of Anemia requiring transfusions (8/16/2018), Ascites, 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 (8/15/2018), Cirrhosis, Esophageal varices, " "Esophageal varices without bleeding (8/15/2018), Hepatic encephalopathy, Hepatitis B, Hepatitis D virus infection, Hypersplenism, Hyponatremia, Hypotension, Liver transplant candidate, Neutropenia, Portal hypertension, Severe malnutrition (8/16/2018), and Type 2 diabetes mellitus, with long-term current use of insulin (8/15/2018).    PSHX:  has a past surgical history that includes EGD (ESOPHAGOGASTRODUODENOSCOPY) (N/A, 8/17/2018).    FAMILY HISTORY: Negative for liver disease, reviewed in [facility]    SOCIAL HISTORY:   Social History     Tobacco Use   Smoking Status Unknown If Ever Smoked       Social History     Substance and Sexual Activity   Alcohol Use No    Frequency: Never       Social History     Substance and Sexual Activity   Drug Use No       ROS:   GENERAL: Denies fever, chills, weight loss/gain, + chronic fatigue  HEENT: Denies headaches, dizziness, vision/hearing changes  CARDIOVASCULAR: Denies chest pain, palpitations, + mild edema  RESPIRATORY: Denies dyspnea, + resolving cough  GI: Denies abd discomfort, no pain. Denies rectal bleeding, nausea, vomiting. No change in bowel pattern or color  : Denies dysuria, hematuria   SKIN: Denies rash,  + itching   NEURO: Denies confusion, memory loss, or mood changes  PSYCH: Denies depression or anxiety  HEME/LYMPH: + easy bruising, denies easy bleeding    PHYSICAL EXAM:   Friendly White female, Chronically ill-appearing. Malnourished. Temporal wasting.  in no acute distress; alert and oriented to person, place and time  VITALS: /62 (BP Location: Right arm, Patient Position: Sitting, BP Method: Medium (Automatic))   Pulse 98   Temp 97.5 °F (36.4 °C) (Oral)   Resp 16   Ht 5' 4" (1.626 m)   Wt 62.5 kg (137 lb 12.6 oz)   SpO2 100%   BMI 23.65 kg/m²   HENT: Normocephalic, without obvious abnormality. Oral mucosa pink and moist. Dentition good.  EYES: Sclerae mildly icteric. No conjunctival pallor.   NECK:  No masses or cervical " adenopathy.  CARDIOVASCULAR: Regular rate and rhythm. No murmurs.  RESPIRATORY: Normal respiratory effort. BBS CTA. No wheezes or crackles.  GI: Soft, no tenderness, distended, Ascites with shifting dullness, not-tense. No masses palpable.   EXTREMITIES:  No clubbing, cyanosis, trace ankle edema.  SKIN: Warm and dry. No jaundice. No rashes noted to exposed skin.+ telangectasias noted. No palmar erythema.   NEURO:  Normal gait ambulating ~50 ft in hallway, arrived to visit in wheelchair due to long walk to clinic. No asterixis.  PSYCH:  Memory intact. Thought and speech pattern appropriate. Behavior normal. No depression or anxiety noted.    RECENT LABS:    Hepatitis A and B immunity markers:    Hepatitis A Antibody IgG   Date Value Ref Range Status   08/15/2018 Positive (A)  Final       Hepatitis B Surface Ag   Date Value Ref Range Status   08/15/2018 Positive (A)  Final     Hep B Core Total Ab   Date Value Ref Range Status   08/15/2018 Positive (A)  Final     Hep B S Ab   Date Value Ref Range Status   08/15/2018 Negative  Final       There is no immunization history on file for this patient.    Labs:  Lab Results   Component Value Date    WBC 3.39 (L) 09/02/2018    HGB 9.5 (L) 09/02/2018    HCT 26.2 (L) 09/02/2018     (L) 09/02/2018     (L) 09/02/2018    K 4.0 09/02/2018    CREATININE 1.2 09/02/2018    ALT 81 (H) 09/02/2018     (H) 09/02/2018    ALKPHOS 456 (H) 09/02/2018    BILITOT 2.0 (H) 09/02/2018    ALBUMIN 3.2 (L) 09/02/2018    INR 1.1 09/02/2018    AFP 2.9 08/15/2018       DIAGNOSTIC STUDIES:  EGD-  Done 8/17/2018, repeat 8/2019  Impression:           - Small hiatal hernia.                        - Grade I esophageal varices.                        - Portal hypertensive gastropathy.                        - Normal examined duodenum.                        - No active bleeding.                        - No specimens collected.    COLONOSCOPY-  ABD. U/S-  Done 8/15/18  FINDINGS:  The  visualized portion of the pancreas is unremarkable.    The liver is normal in size measuring 12.4 cm.  The liver demonstrates nodular contour and heterogeneous echotexture.  No focal hepatic lesions are seen.    The gallbladder demonstrates a 1.0 cm mobile gallstone in the gallbladder neck, multiple punctate hyper echogenic foci in the gallbladder wall suggestive of small adherent stones or adenomyomatosis, and a 0.7 cm focal isoechoic region near the fundus which may represent adherent sludge or polyp.  The common duct is not dilated, measuring 3 mm.  The gallbladder wall is not thickened.  There is no sonographic Burgess sign.  No dilated intrahepatic radicles are seen.    The spleen is enlarged measuring 12.9 x 4.8 cm with a homogeneous echotexture.    The aorta tapers normally.    The kidneys are normal in size without focal abnormality or evidence of hydronephrosis.    There is moderate volume ascites.    The main portal vein, right portal vein, left portal vein, middle hepatic vein, right hepatic vein, left hepatic vein, SMV, and IVC are patent with proper directional flow.  The main hepatic artery is patent with normal waveform.  The umbilical vein is not patent.  The Celiac artery is normal on expiration with a velocity of 178 cm/sec.      Impression       Satisfactory Doppler ultrasound of the liver.    Cirrhotic liver with evidence of portal hypertension including splenomegaly and ascites.    Cholelithiasis, possible adenomyomatosis, and adherent sludge versus gallbladder polyp.  Recommend continued ultrasound surveillance for possible polyp.       MRI-  Done 8/16/18  FINDINGS:  Liver: Heterogeneous hepatic parenchyma with nodular contour compatible with history of cirrhosis.  Subtle heterogeneous enhancement likely relating to regenerative nodules and fibrosis.  No discrete lesion identified.  Hepatic and portal veins are patent.    Biliary: Gallbladder contains a gallstone at the gallbladder neck.  No  intrahepatic or extrahepatic biliary dilatation.    Pancreas: Unremarkable.  No pancreatic ductal dilatation.    Spleen: Enlarged in size with left upper quadrant collateral vessel formation.  No focal lesions.    Adrenal glands: Unremarkable.    Kidneys: Bilateral renal cortical cysts.  No renal masses.  No hydronephrosis.    Miscellaneous: Visualized bowel loops are unremarkable.  No evidence for lymphadenopathy.  Moderate volume ascites.    Small left pleural effusion.      Impression       Cirrhotic liver without discrete lesion.    Findings compatible with sequela of portal hypertension including splenomegaly, left upper quadrant collateral vessel formation and abdominal ascites.    Cholelithiasis.    Small left pleural effusion.       LIVER BIOPSY- none     ASSESSMENT:  68 y.o. White female with:  1.  Cirrhosis, decompensated, due to Hep B with Delta.    -- MELD-Na score: 22 at 9/2/2018  8:06 AM  MELD score: 12 at 9/2/2018  8:06 AM  Calculated from:  Serum Creatinine: 1.2 mg/dL at 9/2/2018  8:06 AM  Serum Sodium: 126 mmol/L at 9/2/2018  8:06 AM  Total Bilirubin: 2 mg/dL at 9/2/2018  8:06 AM  INR(ratio): 1.1 at 9/2/2018  8:06 AM  Age: 68 years  -- HCC screening: AFP and abd. U/S.. Repeat due 2/2019  -- + Immunity to Hep A   -- EGD in past, next due 8/2019      2. Hep B with delta  Hep B studies:  -- + sAg, DNA 79  -- negative sAb  -- On Entecavir 0.5 mg daily   Discussed with Dr. Worrell will continue above dose of Entecavir    3. Elevated alk phos, pruritis  -- alk phos increasing, will start Chiquis TID, may improve itching     4. Malnutrition  -- appt with Rayna Hill RN tomorrow (pt rescheduled from today), may also benefit from NED Kumar NP prehab visits  - + temporal wasting    5. Hyponatremia  -- off diuretics, Na stable   -- fluid restriction 1 L, following restriction    6. Hepatic encephalopathy   -- taking Lactulose 30 ml TID, had constipation over weekend, now having regular BM, improved gas and  "bloating since suppository, denies any abd discomfort, started Xifaxan BID after last visit. Denies Confusion or disorientation    7. Ascites  -- diuretic therapy on hold due to hyponatremia  -- Ascites protein >1 so will defer SBP prophylaxis  -- weekly paracentesis scheduled     8. Pulmonary nodule   -- per last ID note while hospitalized " In view of patient now being listed for liver transplant, increased procalcitonin and CT chest findings, recommend pulmonology consult for evaluation of left sided pleural effusion"  -- cryptogen ab ordered per ID negative  -- Recommend surveillance CT chest in 3 months to monitor pulmonary nodules seen on CT (11/2018), can repeat if not transplanted by then   -- discussed above recommendations with Dr. Worrell, pleural effusion likely r/t ascites, no need for pulmonology referral at this time, can repeat surveillance if not transplanted in 3 months    9. h/o Portal vein thrombosis   - per ID conference note: Thrombus from MPV to bifurcation; but potentially still doable.  Ok to come for evaluation  -- previously on Lovenox, discontinued while hospitalized due to pancytopenia, note per hospital med on 8/21/18: hold lovenox due to thrombocytopenia  -- Discussed with Dr. Worrell after last visit, will repeat US in 1 month (end Sept) then determine if needs anticoagulation     10. Chronic neuropathy  -- reports in upper and lower extremities, unchanged from past neuropathy  -- likely from DM but other etiologies possible  -- discussed with pt if wishes w/u can refer to neurology. Discouraged use of Magnesium to "treat" neuropathy given past Mg level WNL and can increase GI discomfort    11. Hypoalbuminemia   -- encouraged high protein diet, encouraged change to Premier Protein shakes, message sent to NED Kumar NP to inquire about scheudling nutritional prehab visit     12. Type 2 DM  -- currently reports fasting BG ~100-140, BG throughout day 200-300, never hypoglycemia. Uncontrolled " postprandial excursions, increase Novolog to 12 units AC, endo appt next week   -- may be source of worsening neuropathy     13. Hypotension, stable   -- on Midodrine 15 mg TID    14. Worsening neuropathy, BLE and BUE  -- discussed that very likely may be due to uncontrolled BG  -- pt requesting referral to neurology, referral placed     EDUCATION:     [facility]     The disease process and manifestations of cirrhosis were discussed.    Discussed implications of a cirrhosis diagnosis with HCC screenings and EGD and why it is important for us to properly monitor for potential complications.     Signs and symptoms of hepatic decompensation were reviewed, including jaundice, ascites, and slowed mentation due to hepatic encephalopathy. The patient should seek medical attention if any of these things occur.  We discussed the potential for bleeding from esophageal varices with symptoms of hematemesis and melena. The patient should report to the Emergency Department for these symptoms.    We discussed the increased risk of hepatocellular carcinoma due to cirrhosis. Continued screening every six months with ultrasound and AFP is recommended, discussed with patient.     Cirrhosis Counseling  - strict abstinence of alcohol use (includes beer, wine, and/or liquor)  - compliance with lactulose (changed to BID and started with lower dose) and rifaximin (started today) for treatment of hepatic encephalopathy  - avoid non-steroidal anti-inflammatory drugs (NSAIDs) such as ibuprofen, Motrin, naprosyn, Alleve due to the risk of kidney damage  - can take acetaminophen (Tylenol), no more than 2000 mg per day  - low sodium (salt) 2 gram per day diet  - high protein diet: TO REVIEW GRAMS AT DIETICIAN VISIT recommend 70-90  grams per day to prevent muscle mass loss. Recommended at least 2 protein shake daily using Premier Protein shakes    - resistance exercises for muscle strength  - avoid  "raw seafoods due to the risk of fatal Vibrio vulnificus infection  - ultrasound of the liver every 6 months for liver cancer screening  - Upper endoscopy every 1-2 years to screen for varices in the stomach and esophagus      PLAN:  1. Labs twice weekly or now, clinic appts weekly (upcoming appt with me 9/10, then Dr Worrell on 9/20/18  2. Start Chiquis 300 mg TID for itching, elevated alk phos   3. Offered change to Kristalose since pt requesting to try "a different type of Lactulose" but abd symptoms seems to have resolved after suppository x1 so likely due to previous constipation, which is now resolved and having BM with lactulose without abd complaints   4. Neurology referral to neuropathy evaluation   5. Recommend surveillance CT chest in 3 months to monitor pulmonary nodules seen on CT (11/2018), can repeat if not transplanted by then   6. Continue paracentesis weekly (needs WBC and Diff with each para)  7. Continue Lactulose, reiterated adjusting to obtain 3-4 BM daily, pt to determine needed dose. Continue Xifaxan 550 mg BID  8. Visit to be rescheduled with NED Hill RD tomorrow (per pt request) for label reading in English (for protein and NA intake, requested by herbert)   9. Message sent for possible prehab visit for nutritional support with NED Kumar NP, NED Hill RD to assess pt tomorrow and proceed after visit   10. Continue to hold all Diuretic therapy due to hyponatremia  11. HCC screening Q 6 months with AFP and abd. U/S - both next due 2/2019  12. EGD to screen for varices, next due 8/2019  13. Abd US with doppler in 1 month to reassess PV, given h/o PVT on Lovenox  14. Continue Entecavir 0.5 mg daily   15. May be able to submit for exception points for ascites, based on MELD trend   16. Cirrhosis counseling as noted above and discussed with patient and family   17. Recommend surveillance CT chest in 3 months to monitor pulmonary nodules seen on CT (11/2018), can repeat if not transplanted by then "   18. Follow-up in 6 days (on 9/10/2018). as scheduled, then Dr. Worrell 9/20/18 as scheduled, labs twice weekly for now     Note routed to Dr. Worrell, pre-liver transplant pool and message sent to Guillermina Vidales RN with above notes    Thank you for allowing me to participate in the care of Scarlett Salinas BECKY Rubin, NP-C    CC'ed note to:   VALENTINA Nino Dr.     1. Needs WBC and Diff on ascites fluid with each para, not done after last para  2. Appt tomorrow with NED Hill RD (pt did not want to stay for appt today)  3. Started Chiquis 300 mg TID for itching   4. Neurology appt for worsening neuropathy     Functional Status: 70% - Cares for self: unable to carry on normal activity or active work  Physical Capacity: Limited Mobility - using wheelchair to attend multiple appts, using walker or cane at home for stability. Able to walk ~50 ft in clinic without assistance

## 2018-09-05 ENCOUNTER — HOSPITAL ENCOUNTER (EMERGENCY)
Facility: HOSPITAL | Age: 68
Discharge: HOME | End: 2018-09-05
Attending: EMERGENCY MEDICINE
Payer: COMMERCIAL

## 2018-09-05 ENCOUNTER — TELEPHONE (OUTPATIENT)
Dept: TRANSPLANT | Facility: CLINIC | Age: 68
End: 2018-09-05

## 2018-09-05 ENCOUNTER — TELEPHONE (OUTPATIENT)
Dept: PHARMACY | Facility: CLINIC | Age: 68
End: 2018-09-05

## 2018-09-05 ENCOUNTER — NUTRITION (OUTPATIENT)
Dept: TRANSPLANT | Facility: CLINIC | Age: 68
End: 2018-09-05
Payer: COMMERCIAL

## 2018-09-05 VITALS
DIASTOLIC BLOOD PRESSURE: 54 MMHG | HEART RATE: 91 BPM | RESPIRATION RATE: 20 BRPM | SYSTOLIC BLOOD PRESSURE: 110 MMHG | OXYGEN SATURATION: 98 % | WEIGHT: 138.88 LBS | TEMPERATURE: 98 F | BODY MASS INDEX: 23.71 KG/M2 | HEIGHT: 64 IN

## 2018-09-05 VITALS — BODY MASS INDEX: 23.71 KG/M2 | HEIGHT: 64 IN | WEIGHT: 138.88 LBS

## 2018-09-05 DIAGNOSIS — R10.9 ABDOMINAL PAIN, UNSPECIFIED ABDOMINAL LOCATION: Primary | ICD-10-CM

## 2018-09-05 DIAGNOSIS — Z76.82 PRE-LIVER TRANSPLANT, LISTED: ICD-10-CM

## 2018-09-05 DIAGNOSIS — R53.81 PHYSICAL DEBILITY: ICD-10-CM

## 2018-09-05 DIAGNOSIS — R63.0 LOSS OF APPETITE: Primary | ICD-10-CM

## 2018-09-05 DIAGNOSIS — E43 SEVERE MALNUTRITION: ICD-10-CM

## 2018-09-05 DIAGNOSIS — R18.8 OTHER ASCITES: ICD-10-CM

## 2018-09-05 DIAGNOSIS — R63.4 WEIGHT LOSS: ICD-10-CM

## 2018-09-05 DIAGNOSIS — R18.8 OTHER ASCITES: Primary | ICD-10-CM

## 2018-09-05 DIAGNOSIS — Z76.82 LIVER TRANSPLANT CANDIDATE: ICD-10-CM

## 2018-09-05 DIAGNOSIS — M62.50 MUSCULAR ATROPHY, UNSPECIFIED SITE: ICD-10-CM

## 2018-09-05 DIAGNOSIS — Z79.4 TYPE 2 DIABETES MELLITUS WITH DIABETIC POLYNEUROPATHY, WITH LONG-TERM CURRENT USE OF INSULIN: ICD-10-CM

## 2018-09-05 DIAGNOSIS — Z76.82 ORGAN TRANSPLANT CANDIDATE: ICD-10-CM

## 2018-09-05 DIAGNOSIS — E11.42 TYPE 2 DIABETES MELLITUS WITH DIABETIC POLYNEUROPATHY, WITH LONG-TERM CURRENT USE OF INSULIN: ICD-10-CM

## 2018-09-05 PROCEDURE — 99999 PR PBB SHADOW E&M-EST. PATIENT-LVL I: CPT | Mod: PBBFAC,TXP,, | Performed by: DIETITIAN, REGISTERED

## 2018-09-05 PROCEDURE — 97802 MEDICAL NUTRITION INDIV IN: CPT | Mod: S$GLB,TXP,, | Performed by: DIETITIAN, REGISTERED

## 2018-09-05 PROCEDURE — 99283 EMERGENCY DEPT VISIT LOW MDM: CPT | Mod: NTX,,, | Performed by: NURSE PRACTITIONER

## 2018-09-05 PROCEDURE — 99283 EMERGENCY DEPT VISIT LOW MDM: CPT | Mod: NTX

## 2018-09-05 RX ORDER — MEGESTROL ACETATE 20 MG/1
20 TABLET ORAL DAILY
Qty: 30 TABLET | Refills: 11 | Status: SHIPPED | OUTPATIENT
Start: 2018-09-05 | End: 2018-09-10 | Stop reason: HOSPADM

## 2018-09-05 NOTE — TELEPHONE ENCOUNTER
Entecavir initial medication delivery - Entecavir delivered to International Department - [facility] (Rm [facility] in [facility]).  Consultation declined due to being previously conducted by inpatient pharmacist.  Co-pay $1,1116.98 (001).  Co-pay paid for by [facility] Approval Form (on file).  [facility] to f/u with medication refills.

## 2018-09-05 NOTE — TELEPHONE ENCOUNTER
Spoke with Katerin earlier today. Patient was asking for paracentesis today due to feeling very uncomfortable. Contacted IR/ [facility]. Patient already scheduled for 9/6. IR unable to accommodate due to already having 15 patients scheduled. Notified international department. Instructed if patient so uncomfortable that patient should report to ED.   Received call just now that patient feeling short of breath and feels need to go to ED. Dr. Worrell notified and Seble Tineo, PRASHANT notified.

## 2018-09-05 NOTE — TELEPHONE ENCOUNTER
After discussion with Transplant Nutrition, megace 20 mg was sent to [facility] to enhance appetite.

## 2018-09-05 NOTE — ED NOTES
LOC: The patient is awake and alert; oriented x 3 and speaking appropriately.  APPEARANCE: Patient resting comfortably, patient is clean and well groomed  SKIN: warm and dry, normal skin turgor & moist mucus membranes, skin intact, no breakdown noted.  MUSCULOSKELETAL: Patient moving all extremities well, no obvious swelling or deformities noted  RESPIRATORY: Airway is open and patent, breath sounds clear throughout all lung fields; respirations are spontaneous, normal effort and rate  CARDIAC: Patient has a normal rate, no peripheral edema noted, capillary refill < 3 seconds; No complaints of chest pain   ABDOMEN:Firm and  Tender to palpation, distention noted, marked ascites. takes lactulose.  Having  Only one stool today.

## 2018-09-05 NOTE — ED TRIAGE NOTES
per Baldev  # [id number]( [place]). Pt needs a paracentesis.which is scheduled for tomorrow am. Here now for increased  fluid. Family states they are afraid pt will not survive the night.  Feels weak and faint. Pain is in abdomen. Denies SOB.  Pt is diabetic. 8 days ago was last  Paracentesis.Only had one BM today despite taking lactulose.

## 2018-09-05 NOTE — PROGRESS NOTES
TRANSPLANT NUTRITIONAL ASSESSMENT    Referring Provider: Jerel Worrell MD    Reason for Visit: Diabetic diet education and Poor appetite / poor intake    Age: 68 y.o.  Sex: female    Patient Active Problem List   Diagnosis    Hepatic encephalopathy    Liver transplant candidate    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    Type 2 diabetes mellitus with diabetic polyneuropathy, with long-term current use of insulin    Esophageal varices without bleeding    Severe malnutrition    Anemia requiring transfusions    Neutropenia    Hypotension    Hyponatremia    Generalized weakness    Ascites     Past Medical History:   Diagnosis Date    Anemia requiring transfusions 8/16/2018    Ascites     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 8/15/2018    Cirrhosis     Cough 8/30/2018    Esophageal varices     Esophageal varices without bleeding 8/15/2018    Hepatic encephalopathy     Hepatitis B     Hepatitis D virus infection     Hypersplenism     Hyponatremia     Hypotension     Liver transplant candidate     Neutropenia     Portal hypertension     Severe malnutrition 8/16/2018    Type 2 diabetes mellitus, with long-term current use of insulin 8/15/2018     Lab Results   Component Value Date     (H) 09/04/2018    K 4.1 09/04/2018    PHOS 2.2 (L) 08/24/2018    MG 1.9 09/04/2018    ALBUMIN 3.4 (L) 09/04/2018    AMMONIA 67 (H) 08/14/2018    HGBA1C 6.8 (H) 08/16/2018    CALCIUM 9.4 09/04/2018     Other Pertinent Labs: none    Current Outpatient Medications   Medication Sig    blood sugar diagnostic (ACCU-CHEK DC) Strp 1 strip by Misc.(Non-Drug; Combo Route) route 3 (three) times daily.    entecavir (BARACLUDE) 0.5 MG Tab Take 1 tablet (0.5 mg total) by mouth once daily.    insulin aspart U-100 (NOVOLOG) 100 unit/mL injection Inject 12 Units into the skin 3 (three) times daily before meals. Plus medium dose sliding scale.    insulin glargine (LANTUS) 100 unit/mL injection  "Inject 18 Units into the skin every evening.    lactulose (CHRONULAC) 10 gram/15 mL solution Take 15 mLs (10 g total) by mouth 2 (two) times daily. Adjust dose to have 4 bowel movements daily    lidocaine-prilocaine (EMLA) cream Apply topically as needed.    megestrol (MEGACE) 20 MG Tab Take 1 tablet (20 mg total) by mouth once daily.    midodrine (PROAMATINE) 5 MG Tab Take 3 tablets (15 mg total) by mouth every 8 (eight) hours.    rifAXIMin (XIFAXAN) 550 mg Tab Take 1 tablet (550 mg total) by mouth 2 (two) times daily.    sodium bicarbonate 650 MG tablet Take 2 tablets (1,300 mg total) by mouth 2 (two) times daily.    ursodiol (ACTIGALL) 300 mg capsule Take 1 capsule (300 mg total) by mouth 3 (three) times daily.     No current facility-administered medications for this visit.      Allergies: Patient has no known allergies.    Ht Readings from Last 1 Encounters:   09/05/18 5' 4.17" (1.63 m)     Wt Readings from Last 1 Encounters:   09/05/18 63 kg (138 lb 14.2 oz)      BMI: Body mass index is 23.71 kg/m².    Usual Weight: 135-150 lb  Weight Change/Time: fluctuates with chronic significant ascites and paracentesis, prior to illness worsening -140 lb  Current Diet: low sodium  Appetite/Current Intake: poor  Exercise/Physical Activity: limited by ascites and debility though family states pt working doing exercises PT had instructed her, walking short distances, using walker for balance  Nutritional/Herbal Supplements: Premier protein and Ensure HP alternating; pt has 1 Premier shake if not eating "much" that day, Ensure shakes 2-3 if eating "better"  Chewing/Swallowing Problems: none   Symptoms: bloating, indigestion     Estimated Kcal Need: 1575- 1890 kcal (25-30 kcal/kg)  Estimated Protein Need: 75-94 gm (1.2-1.5 gm/kg)    Nutritional History:   Pt present with , spouse and daughter today. Pt's daughter answered most questions, pt and spouse provided some feedback. Pt has very good appetite, " "eating about "a few tsp or Tbp" of food at a time, she may return for more some days, other days not. Pt has many taste aversions at this time as well. Daughter is shopping and preparing a variety of food for patient. Pt's daughter asked about sodium, sugar and sugar alcohols, protein. They provided the following diet recall:  B: oatmeal or grits or 1 egg w/ 1 toast w/ cream cheese or cottage cheese, occasional corn flakes w/ milk, green tea/club soda, a little yogurt  L/Snack: yogurt, fresh fruit, Protein shake (either premier or Ensure), homemade soup w/ vegetables, lentils, beans, chicken (pt not eating chicken from soup)  D: soup again, fish w/ vegetables & bulgar wheat/cous cous/semolina etc, asparagus, cucumbers, radishes, rarely having food from out/restaurant since out of hospital, scallops or fish occasionally  Snack: protein shake Ensure or cottage cheese or egg white, 3-4 grapes, plans to make hummus soon, sugar free chocolate candy (1-2 pieces)    Nutritional Diagnoses  Problem: food- and nutrition-related knowledge deficit  Etiology: r/t no prior edu on adequate protein intake per day/ways to achieve  Symptoms: aeb diet recall, questions    Educational Need? yes  Barriers: none identified  Discussed with: patient, spouse and daughter  Interventions: Patient taught nutrition information regarding Poor appetite / poor intake and Weight gain education.    Provided education on protein content in foods, goal intake per day, suggestions for ways to reach protein intake goal; nutrition supplements, snacks.   Eat small snack, protein + carb/shake every 2-3 hours, no skipping.  Continue physical activities daily, short intervals, limit use of wheel chair.  Continue low sodium diet precautions.  Have 2-3 shakes daily, regardless of small increase in intake at 1 or 2 meals that day.  MD ordered appetite stimulant rx, per family request pt to try. Advised to family this may help, may not, and may make pt more " lethargic - monitor.   Goals/Recommendations: small frequent meals and snacks, consumption of jensen nutritional supplements and increase protein intake  Actions Taken: instruct/provide written information  Patient and/or family comprehend instructions: yes  Outcome: Verbalizes understanding  Monitoring: Follow up in clinic if needed. RD contact info provided.     Counseling Time: 45 minutes

## 2018-09-05 NOTE — ED NOTES
Pt requires [facility]EL[facility] for - speaks Swedish. On liver transplant list and here for  abdominal ascites.Daughter states pt is here for a paracentesis. Accompanied by daughter and

## 2018-09-05 NOTE — ED PROVIDER NOTES
Encounter Date: 9/5/2018       History     Chief Complaint   Patient presents with    Abdominal Pain     From [facility]. on liver transplant list. States needs paracentesis, has one scheduled tomorrow but states pain is too bad to wait.        Patient is a 68-year-old female with medical history of ascites, hep B, cirrhosis,  liver listed presenting to the ED for increased abdominal pain and ascites.   Patient is scheduled for a paracentesis tomorrow morning.  Patient family states they are concerned for increased pain and increased fluid retention.          Review of patient's allergies indicates:  No Known Allergies  Past Medical History:   Diagnosis Date    Anemia requiring transfusions 8/16/2018    Ascites     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 8/15/2018    Cirrhosis     Cough 8/30/2018    Esophageal varices     Esophageal varices without bleeding 8/15/2018    Hepatic encephalopathy     Hepatitis B     Hepatitis D virus infection     Hypersplenism     Hyponatremia     Hypotension     Liver transplant candidate     Neutropenia     Portal hypertension     Severe malnutrition 8/16/2018    Type 2 diabetes mellitus, with long-term current use of insulin 8/15/2018     No past surgical history on file.  No family history on file.  Social History     Tobacco Use    Smoking status: Unknown If Ever Smoked   Substance Use Topics    Alcohol use: No     Frequency: Never    Drug use: No     Review of Systems   Constitutional: Negative for fever.   HENT: Negative for sore throat.    Respiratory: Negative for chest tightness and shortness of breath.    Cardiovascular: Negative for chest pain.   Gastrointestinal: Positive for abdominal distention and abdominal pain. Negative for constipation, diarrhea, nausea and vomiting.   Musculoskeletal: Negative for back pain.   Skin: Negative for rash.   Neurological: Negative for dizziness, syncope, weakness, numbness and headaches.    Hematological: Does not bruise/bleed easily.       Physical Exam     Initial Vitals [09/05/18 1544]   BP Pulse Resp Temp SpO2   (!) 111/54 101 18 97.6 °F (36.4 °C) 96 %      MAP       --         Physical Exam    Nursing note and vitals reviewed.  Constitutional: She appears well-developed and well-nourished. She is cooperative. She has a sickly appearance ( chronically).   HENT:   Head: Normocephalic and atraumatic.   Eyes: EOM are normal. Pupils are equal, round, and reactive to light. Scleral icterus is present.   Neck: Normal range of motion.   Cardiovascular: Normal rate, regular rhythm, normal heart sounds and intact distal pulses.   Pulses:       Radial pulses are 2+ on the right side, and 2+ on the left side.        Dorsalis pedis pulses are 2+ on the right side, and 2+ on the left side.   Pulmonary/Chest: Breath sounds normal.   Abdominal: Soft. Bowel sounds are normal. She exhibits distension, fluid wave and ascites. There is tenderness. There is no rigidity, no rebound and no guarding.   Musculoskeletal: Normal range of motion.   Neurological: She is alert and oriented to person, place, and time. She has normal strength. No cranial nerve deficit or sensory deficit. GCS eye subscore is 4. GCS verbal subscore is 5. GCS motor subscore is 6.   Skin: Skin is warm, dry and intact. Capillary refill takes less than 2 seconds. No abrasion, no laceration and no rash noted. No cyanosis. Nails show no clubbing.   Psychiatric: She has a normal mood and affect. Her speech is normal and behavior is normal. Judgment and thought content normal. Cognition and memory are normal.         ED Course   Procedures  Labs Reviewed - No data to display       Imaging Results    None                APC / Resident Notes:   Emergent evaluation of a 67 yo female patient presenting to the ER with chief complaint of   Ascites.  Patient's family states they want her paracentesis completed today instead of tomorrow due to increased pain.   Patient is scheduled for paracentesis tomorrow morning at 9:00 a.m.   Patient endorses increased abdominal pain.  Ascites noted with fluid wave.   Scleral icterus noted.  I will consult liver transplant service and reassess.  Differential diagnoses include but are not limited to ascites, cirrhosis, liver failure.  I discussed the care of this patient with my Supervising Physician.      Liver transplant service at bedside.  As per team patient is stable for discharge home.  Patient is scheduled for paracentesis at 9 o'clock tomorrow morning.  Patient and family updated and agreeable to plan of care.Patient is hemodynamically stable, vital signs are normal.  Discharge instructions given.  Return to ED precautions discussed. Follow up as directed. Pt verbalized understanding of this plan. Pt is stable for discharge.            Attending Attestation:     Physician Attestation Statement for NP/PA:   I discussed this assessment and plan of this patient with the NP/PA, but I did not personally examine the patient. The face to face encounter was performed by the NP/PA.                     Clinical Impression:   The primary encounter diagnosis was Abdominal pain, unspecified abdominal location. Diagnoses of Pre-liver transplant, listed and Other ascites were also pertinent to this visit.      Disposition:   Disposition: Discharged  Condition: Stable                        Carleen Lopez NP  09/05/18 [id number]       Marin Young MD  09/05/18 [id number]

## 2018-09-06 ENCOUNTER — HOSPITAL ENCOUNTER (OUTPATIENT)
Dept: INTERVENTIONAL RADIOLOGY/VASCULAR | Facility: HOSPITAL | Age: 68
Discharge: HOME | End: 2018-09-06
Attending: INTERNAL MEDICINE
Payer: COMMERCIAL

## 2018-09-06 ENCOUNTER — TELEPHONE (OUTPATIENT)
Dept: SURGERY | Facility: CLINIC | Age: 68
End: 2018-09-06

## 2018-09-06 VITALS
RESPIRATION RATE: 18 BRPM | OXYGEN SATURATION: 99 % | HEART RATE: 103 BPM | DIASTOLIC BLOOD PRESSURE: 52 MMHG | SYSTOLIC BLOOD PRESSURE: 96 MMHG

## 2018-09-06 DIAGNOSIS — R18.8 ASCITES OF LIVER: ICD-10-CM

## 2018-09-06 PROCEDURE — 49083 ABD PARACENTESIS W/IMAGING: CPT | Mod: NTX,,, | Performed by: FAMILY MEDICINE

## 2018-09-06 PROCEDURE — 63600175 PHARM REV CODE 636 W HCPCS: Mod: JG,NTX | Performed by: INTERNAL MEDICINE

## 2018-09-06 PROCEDURE — P9047 ALBUMIN (HUMAN), 25%, 50ML: HCPCS | Mod: JG,NTX | Performed by: INTERNAL MEDICINE

## 2018-09-06 PROCEDURE — 27200940 IR PARACENTESIS WITH IMAGING: Mod: TXP

## 2018-09-06 RX ORDER — ALBUMIN HUMAN 250 G/1000ML
25 SOLUTION INTRAVENOUS
Status: DISCONTINUED | OUTPATIENT
Start: 2018-09-06 | End: 2018-09-07 | Stop reason: HOSPADM

## 2018-09-06 RX ADMIN — ALBUMIN (HUMAN) 25 G: 25 SOLUTION INTRAVENOUS at 10:09

## 2018-09-06 NOTE — DISCHARGE INSTRUCTIONS
For scheduling: Call Yesika at [phone]    For questions or concerns call: MIHAI MON-FRI 8 AM- 5PM [phone]. Radiology resident on call [phone].    For immediate concerns that are not emergent, you may call our radiology clinic at: [phone]

## 2018-09-06 NOTE — PROCEDURES
Radiology Post-Procedure Note    Pre Op Diagnosis: Ascites  Post Op Diagnosis: Same    Procedure: Ultrasound Guided Paracentesis    Procedure performed by: Juan Luis ALLISON, Elizabeth     Written Informed Consent Obtained: Yes  Specimen Removed: YES cloudy white  Estimated Blood Loss: Minimal    Findings:   Successful paracentesis.  Albumin administered PRN per protocol.    Patient tolerated procedure well.    Elizabeth Mcknight, APRN, FNP  Interventional Radiology  [phone] [employer]

## 2018-09-06 NOTE — PROGRESS NOTES
Paracentesis complete. 8100 mLs peritoneal fluid drained. Pt tolerated well. Dressing to llq clean, dry, and intact. Albumin 25% given 200 mLs.   at bedside.  Discharge instructions reviewed with patient with use of . Pt out of procedure room via wheelchair, family and  at bedside.

## 2018-09-06 NOTE — H&P
Radiology History & Physical      SUBJECTIVE:     Chief Complaint: ascites    History of Present Illness:  Scarlett Reddy is a 68 y.o. female who presents for ultrasound guided paracentesis  Past Medical History:   Diagnosis Date    Anemia requiring transfusions 8/16/2018    Ascites     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 8/15/2018    Cirrhosis     Cough 8/30/2018    Esophageal varices     Esophageal varices without bleeding 8/15/2018    Hepatic encephalopathy     Hepatitis B     Hepatitis D virus infection     Hypersplenism     Hyponatremia     Hypotension     Liver transplant candidate     Neutropenia     Portal hypertension     Severe malnutrition 8/16/2018    Type 2 diabetes mellitus, with long-term current use of insulin 8/15/2018     No past surgical history on file.    Home Meds:   Prior to Admission medications    Medication Sig Start Date End Date Taking? Authorizing Provider   blood sugar diagnostic (ACCU-CHEK DC) Strp 1 strip by Misc.(Non-Drug; Combo Route) route 3 (three) times daily.    Historical Provider, MD   entecavir (BARACLUDE) 0.5 MG Tab Take 1 tablet (0.5 mg total) by mouth once daily. 9/4/18   Rita Rubin NP   insulin aspart U-100 (NOVOLOG) 100 unit/mL injection Inject 12 Units into the skin 3 (three) times daily before meals. Plus medium dose sliding scale. 9/4/18   Rita Rubin NP   insulin glargine (LANTUS) 100 unit/mL injection Inject 18 Units into the skin every evening. 8/31/18   Cristobal Marmolejo Jr., MD   lactulose (CHRONULAC) 10 gram/15 mL solution Take 15 mLs (10 g total) by mouth 2 (two) times daily. Adjust dose to have 4 bowel movements daily 9/4/18   Rita Rubin NP   lidocaine-prilocaine (EMLA) cream Apply topically as needed. 8/31/18   Cristobal Marmolejo Jr., MD   megestrol (MEGACE) 20 MG Tab Take 1 tablet (20 mg total) by mouth once daily. 9/5/18 9/5/19  Jerel Worrell MD   midodrine (PROAMATINE) 5 MG Tab Take 3 tablets (15 mg  total) by mouth every 8 (eight) hours. 8/24/18 2/20/19  Gigi Sprague MD   rifAXIMin (XIFAXAN) 550 mg Tab Take 1 tablet (550 mg total) by mouth 2 (two) times daily. 8/27/18   Rita Rubin NP   sodium bicarbonate 650 MG tablet Take 2 tablets (1,300 mg total) by mouth 2 (two) times daily. 8/24/18 8/24/19  Gigi Sprague MD   ursodiol (ACTIGALL) 300 mg capsule Take 1 capsule (300 mg total) by mouth 3 (three) times daily. 9/4/18   Rita Rubin NP     Anticoagulants/Antiplatelets: no anticoagulation    Allergies: Review of patient's allergies indicates:  No Known Allergies  Sedation History:  no adverse reactions    Review of Systems:   Hematological: no known coagulopathies  Respiratory: no shortness of breath  Cardiovascular: no chest pain  Gastrointestinal: no abdominal pain  Genito-Urinary: no dysuria  Musculoskeletal: negative  Neurological: no TIA or stroke symptoms         OBJECTIVE:     Vital Signs (Most Recent)       Physical Exam:  ASA: 2  Mallampati: n/a    General: no acute distress  Mental Status: alert and oriented to person, place and time  HEENT: normocephalic, atraumatic  Chest: unlabored breathing  Heart: regular heart rate  Abdomen: distended  Extremity: moves all extremities    Laboratory  Lab Results   Component Value Date    INR 1.1 09/04/2018       Lab Results   Component Value Date    WBC 4.45 09/04/2018    HGB 10.2 (L) 09/04/2018    HCT 31.0 (L) 09/04/2018    MCV 93 09/04/2018     (L) 09/04/2018      Lab Results   Component Value Date     (H) 09/04/2018     (L) 09/04/2018    K 4.1 09/04/2018    CL 94 (L) 09/04/2018    CO2 23 09/04/2018    BUN 52 (H) 09/04/2018    CREATININE 1.3 09/04/2018    CALCIUM 9.4 09/04/2018    MG 1.9 09/04/2018     (H) 09/04/2018     (H) 09/04/2018    ALBUMIN 3.4 (L) 09/04/2018    BILITOT 2.3 (H) 09/04/2018       ASSESSMENT/PLAN:     Sedation Plan: local  Patient will undergo ultrasound guided paracentesis.    Elizabeth Mcknight,  APRN, FNP  Interventional Radiology  [phone] [employer]

## 2018-09-06 NOTE — TELEPHONE ENCOUNTER
----- Message from Esme Kumar NP sent at 9/6/2018  1:06 PM CDT -----  Regarding: FW: possible visit for nutritional prehab? on waitlist for liver transplant   Cna you call and get this person into my prehab clinic?    Thanks,    Esme  ----- Message -----  From: Rita Rubin NP  Sent: 8/30/2018   1:48 PM  To: Esme Kumar NP  Subject: RE: possible visit for nutritional prehab? o#    Perfect, thanks!    Rita  ----- Message -----  From: Esme Kumar NP  Sent: 8/30/2018   1:36 PM  To: Rita Rubin NP  Subject: RE: possible visit for nutritional prehab? o#    Of course! I will have our staff get her in not this Friday but the next. I have a prehab clinic on Fridays. Thanks for the referral.     Esme  ----- Message -----  From: Rita Rubin NP  Sent: 8/27/2018   3:52 PM  To: Guillermina Vidales RN, Esme Kumar NP  Subject: possible visit for nutritional prehab? on wa# Hi Siobhan    I saw Ms. Scarlett Reddy in the pre-liver transplant clinic today. She is here from [facility] and on the waitlist for a liver transplant. She has significant muscle wasting and poor nutritional intake currently and would benefit from nutritional prehab visit with you. Do you think you may be able to see her for a visit? They are very flexible and would be willing to make whatever works for you work.     Thanks in advance  ASHOK Lopez, FNP-C  Hepatology and Liver Transplant Nurse Practitioner  [facility]  [facility] [place]

## 2018-09-06 NOTE — PROGRESS NOTES
Pt arrived to [facility] for paracentesis with  and family.  Name verified using two identifiers.  Allergies verified. Waiting cor consent.  Will continue to monitor.

## 2018-09-07 ENCOUNTER — LAB VISIT (OUTPATIENT)
Dept: LAB | Facility: HOSPITAL | Age: 68
End: 2018-09-07
Attending: INTERNAL MEDICINE
Payer: COMMERCIAL

## 2018-09-07 DIAGNOSIS — Z76.82 ORGAN TRANSPLANT CANDIDATE: ICD-10-CM

## 2018-09-07 LAB
ALBUMIN SERPL BCP-MCNC: 4 G/DL
ALP SERPL-CCNC: 397 U/L
ALT SERPL W/O P-5'-P-CCNC: 60 U/L
ANION GAP SERPL CALC-SCNC: 12 MMOL/L
AST SERPL-CCNC: 90 U/L
BASOPHILS # BLD AUTO: 0.02 K/UL
BASOPHILS NFR BLD: 0.4 %
BILIRUB SERPL-MCNC: 2.8 MG/DL
BUN SERPL-MCNC: 49 MG/DL
CALCIUM SERPL-MCNC: 9.8 MG/DL
CHLORIDE SERPL-SCNC: 93 MMOL/L
CO2 SERPL-SCNC: 25 MMOL/L
CREAT SERPL-MCNC: 1.3 MG/DL
DIFFERENTIAL METHOD: ABNORMAL
EOSINOPHIL # BLD AUTO: 0.1 K/UL
EOSINOPHIL NFR BLD: 1.3 %
ERYTHROCYTE [DISTWIDTH] IN BLOOD BY AUTOMATED COUNT: 18.2 %
EST. GFR  (AFRICAN AMERICAN): 48.4 ML/MIN/1.73 M^2
EST. GFR  (NON AFRICAN AMERICAN): 42 ML/MIN/1.73 M^2
GLUCOSE SERPL-MCNC: 244 MG/DL
HCT VFR BLD AUTO: 31.2 %
HGB BLD-MCNC: 10.2 G/DL
IMM GRANULOCYTES # BLD AUTO: 0.07 K/UL
IMM GRANULOCYTES NFR BLD AUTO: 1.6 %
INR PPP: 1.1
LYMPHOCYTES # BLD AUTO: 0.4 K/UL
LYMPHOCYTES NFR BLD: 9.8 %
MCH RBC QN AUTO: 30.8 PG
MCHC RBC AUTO-ENTMCNC: 32.7 G/DL
MCV RBC AUTO: 94 FL
MONOCYTES # BLD AUTO: 0.4 K/UL
MONOCYTES NFR BLD: 9.1 %
NEUTROPHILS # BLD AUTO: 3.5 K/UL
NEUTROPHILS NFR BLD: 77.8 %
NRBC BLD-RTO: 0 /100 WBC
PLATELET # BLD AUTO: 100 K/UL
PMV BLD AUTO: 11 FL
POTASSIUM SERPL-SCNC: 3.5 MMOL/L
PROT SERPL-MCNC: 6.7 G/DL
PROTHROMBIN TIME: 11.7 SEC
RBC # BLD AUTO: 3.31 M/UL
SODIUM SERPL-SCNC: 130 MMOL/L
WBC # BLD AUTO: 4.5 K/UL

## 2018-09-07 PROCEDURE — 80053 COMPREHEN METABOLIC PANEL: CPT | Mod: TXP

## 2018-09-07 PROCEDURE — 85025 COMPLETE CBC W/AUTO DIFF WBC: CPT | Mod: TXP

## 2018-09-07 PROCEDURE — 36415 COLL VENOUS BLD VENIPUNCTURE: CPT | Mod: TXP

## 2018-09-07 PROCEDURE — 85610 PROTHROMBIN TIME: CPT | Mod: TXP

## 2018-09-10 ENCOUNTER — TELEPHONE (OUTPATIENT)
Dept: TRANSPLANT | Facility: CLINIC | Age: 68
End: 2018-09-10

## 2018-09-10 ENCOUNTER — LAB VISIT (OUTPATIENT)
Dept: LAB | Facility: HOSPITAL | Age: 68
End: 2018-09-10
Attending: INTERNAL MEDICINE
Payer: COMMERCIAL

## 2018-09-10 ENCOUNTER — OFFICE VISIT (OUTPATIENT)
Dept: TRANSPLANT | Facility: CLINIC | Age: 68
End: 2018-09-10
Payer: COMMERCIAL

## 2018-09-10 ENCOUNTER — HOSPITAL ENCOUNTER (INPATIENT)
Facility: HOSPITAL | Age: 68
LOS: 2 days | Discharge: HOME | DRG: 640 | End: 2018-09-12
Attending: SURGERY | Admitting: SURGERY
Payer: COMMERCIAL

## 2018-09-10 VITALS
OXYGEN SATURATION: 97 % | BODY MASS INDEX: 23.45 KG/M2 | HEIGHT: 64 IN | SYSTOLIC BLOOD PRESSURE: 116 MMHG | TEMPERATURE: 98 F | HEART RATE: 113 BPM | RESPIRATION RATE: 18 BRPM | DIASTOLIC BLOOD PRESSURE: 74 MMHG | WEIGHT: 137.38 LBS

## 2018-09-10 DIAGNOSIS — E87.1 HYPONATREMIA: Primary | ICD-10-CM

## 2018-09-10 DIAGNOSIS — Z76.82 ORGAN TRANSPLANT CANDIDATE: ICD-10-CM

## 2018-09-10 DIAGNOSIS — K76.82 HEPATIC ENCEPHALOPATHY: ICD-10-CM

## 2018-09-10 DIAGNOSIS — Z79.4 TYPE 2 DIABETES MELLITUS WITH DIABETIC POLYNEUROPATHY, WITH LONG-TERM CURRENT USE OF INSULIN: ICD-10-CM

## 2018-09-10 DIAGNOSIS — K74.60 CHRONIC HEPATITIS B WITH DELTA AGENT WITH CIRRHOSIS: ICD-10-CM

## 2018-09-10 DIAGNOSIS — I81 PORTAL VEIN THROMBOSIS: ICD-10-CM

## 2018-09-10 DIAGNOSIS — E87.1 HYPONATREMIA: ICD-10-CM

## 2018-09-10 DIAGNOSIS — R63.4 WEIGHT LOSS: ICD-10-CM

## 2018-09-10 DIAGNOSIS — E11.42 TYPE 2 DIABETES MELLITUS WITH DIABETIC POLYNEUROPATHY, WITH LONG-TERM CURRENT USE OF INSULIN: ICD-10-CM

## 2018-09-10 DIAGNOSIS — I85.10 SECONDARY ESOPHAGEAL VARICES WITHOUT BLEEDING: ICD-10-CM

## 2018-09-10 DIAGNOSIS — E43 SEVERE MALNUTRITION: ICD-10-CM

## 2018-09-10 DIAGNOSIS — I95.9 HYPOTENSION, UNSPECIFIED HYPOTENSION TYPE: ICD-10-CM

## 2018-09-10 DIAGNOSIS — R63.0 LOSS OF APPETITE: ICD-10-CM

## 2018-09-10 DIAGNOSIS — R18.8 ASCITES: ICD-10-CM

## 2018-09-10 DIAGNOSIS — R18.8 OTHER ASCITES: ICD-10-CM

## 2018-09-10 DIAGNOSIS — R18.8 OTHER ASCITES: Primary | ICD-10-CM

## 2018-09-10 DIAGNOSIS — R07.9 CHEST PAIN: ICD-10-CM

## 2018-09-10 DIAGNOSIS — R10.84 GENERALIZED ABDOMINAL PAIN: ICD-10-CM

## 2018-09-10 DIAGNOSIS — R07.9 CHEST PAIN, UNSPECIFIED TYPE: ICD-10-CM

## 2018-09-10 DIAGNOSIS — I85.00 ESOPHAGEAL VARICES WITHOUT BLEEDING, UNSPECIFIED ESOPHAGEAL VARICES TYPE: ICD-10-CM

## 2018-09-10 DIAGNOSIS — Z76.82 LIVER TRANSPLANT CANDIDATE: ICD-10-CM

## 2018-09-10 DIAGNOSIS — B18.0 CHRONIC HEPATITIS B WITH DELTA AGENT WITH CIRRHOSIS: ICD-10-CM

## 2018-09-10 LAB
ALBUMIN SERPL BCP-MCNC: 3.3 G/DL
ALBUMIN SERPL BCP-MCNC: 3.5 G/DL
ALP SERPL-CCNC: 284 U/L
ALP SERPL-CCNC: 299 U/L
ALT SERPL W/O P-5'-P-CCNC: 41 U/L
ALT SERPL W/O P-5'-P-CCNC: 45 U/L
ANION GAP SERPL CALC-SCNC: 12 MMOL/L
ANION GAP SERPL CALC-SCNC: 14 MMOL/L
AST SERPL-CCNC: 63 U/L
AST SERPL-CCNC: 65 U/L
BASOPHILS # BLD AUTO: 0.03 K/UL
BASOPHILS # BLD AUTO: 0.04 K/UL
BASOPHILS NFR BLD: 0.5 %
BASOPHILS NFR BLD: 0.7 %
BILIRUB SERPL-MCNC: 2.5 MG/DL
BILIRUB SERPL-MCNC: 2.7 MG/DL
BUN SERPL-MCNC: 61 MG/DL
BUN SERPL-MCNC: 62 MG/DL
CALCIUM SERPL-MCNC: 9.7 MG/DL
CALCIUM SERPL-MCNC: 9.7 MG/DL
CHLORIDE SERPL-SCNC: 90 MMOL/L
CHLORIDE SERPL-SCNC: 93 MMOL/L
CO2 SERPL-SCNC: 22 MMOL/L
CO2 SERPL-SCNC: 23 MMOL/L
CREAT SERPL-MCNC: 1.2 MG/DL
CREAT SERPL-MCNC: 1.4 MG/DL
DIFFERENTIAL METHOD: ABNORMAL
DIFFERENTIAL METHOD: ABNORMAL
EOSINOPHIL # BLD AUTO: 0.1 K/UL
EOSINOPHIL # BLD AUTO: 0.1 K/UL
EOSINOPHIL NFR BLD: 1.1 %
EOSINOPHIL NFR BLD: 1.6 %
ERYTHROCYTE [DISTWIDTH] IN BLOOD BY AUTOMATED COUNT: 17.6 %
ERYTHROCYTE [DISTWIDTH] IN BLOOD BY AUTOMATED COUNT: 17.9 %
EST. GFR  (AFRICAN AMERICAN): 44.2 ML/MIN/1.73 M^2
EST. GFR  (AFRICAN AMERICAN): 53.3 ML/MIN/1.73 M^2
EST. GFR  (NON AFRICAN AMERICAN): 38.4 ML/MIN/1.73 M^2
EST. GFR  (NON AFRICAN AMERICAN): 46.2 ML/MIN/1.73 M^2
GLUCOSE SERPL-MCNC: 159 MG/DL
GLUCOSE SERPL-MCNC: 252 MG/DL
HCT VFR BLD AUTO: 31 %
HCT VFR BLD AUTO: 32.3 %
HGB BLD-MCNC: 10.3 G/DL
HGB BLD-MCNC: 10.4 G/DL
IMM GRANULOCYTES # BLD AUTO: 0.06 K/UL
IMM GRANULOCYTES # BLD AUTO: 0.08 K/UL
IMM GRANULOCYTES NFR BLD AUTO: 1 %
IMM GRANULOCYTES NFR BLD AUTO: 1.3 %
INR PPP: 1.1
INR PPP: 1.1
LYMPHOCYTES # BLD AUTO: 0.4 K/UL
LYMPHOCYTES # BLD AUTO: 0.5 K/UL
LYMPHOCYTES NFR BLD: 6.7 %
LYMPHOCYTES NFR BLD: 8 %
MAGNESIUM SERPL-MCNC: 2.2 MG/DL
MCH RBC QN AUTO: 30.3 PG
MCH RBC QN AUTO: 31.3 PG
MCHC RBC AUTO-ENTMCNC: 31.9 G/DL
MCHC RBC AUTO-ENTMCNC: 33.5 G/DL
MCV RBC AUTO: 93 FL
MCV RBC AUTO: 95 FL
MONOCYTES # BLD AUTO: 0.6 K/UL
MONOCYTES # BLD AUTO: 0.6 K/UL
MONOCYTES NFR BLD: 10.2 %
MONOCYTES NFR BLD: 8.8 %
NEUTROPHILS # BLD AUTO: 4.9 K/UL
NEUTROPHILS # BLD AUTO: 5 K/UL
NEUTROPHILS NFR BLD: 80 %
NEUTROPHILS NFR BLD: 80.1 %
NRBC BLD-RTO: 0 /100 WBC
NRBC BLD-RTO: 0 /100 WBC
PHOSPHATE SERPL-MCNC: 4.5 MG/DL
PLATELET # BLD AUTO: 101 K/UL
PLATELET # BLD AUTO: 103 K/UL
PMV BLD AUTO: 10.7 FL
PMV BLD AUTO: 10.8 FL
POCT GLUCOSE: 138 MG/DL (ref 70–110)
POCT GLUCOSE: 153 MG/DL (ref 70–110)
POCT GLUCOSE: 194 MG/DL (ref 70–110)
POTASSIUM SERPL-SCNC: 4.1 MMOL/L
POTASSIUM SERPL-SCNC: 4.2 MMOL/L
PROT SERPL-MCNC: 6.1 G/DL
PROT SERPL-MCNC: 6.5 G/DL
PROTHROMBIN TIME: 11.2 SEC
PROTHROMBIN TIME: 11.3 SEC
RBC # BLD AUTO: 3.32 M/UL
RBC # BLD AUTO: 3.4 M/UL
SODIUM SERPL-SCNC: 126 MMOL/L
SODIUM SERPL-SCNC: 128 MMOL/L
WBC # BLD AUTO: 6.1 K/UL
WBC # BLD AUTO: 6.25 K/UL

## 2018-09-10 PROCEDURE — 99223 1ST HOSP IP/OBS HIGH 75: CPT | Mod: NTX,,, | Performed by: NURSE PRACTITIONER

## 2018-09-10 PROCEDURE — 80053 COMPREHEN METABOLIC PANEL: CPT | Mod: NTX

## 2018-09-10 PROCEDURE — 99999 PR PBB SHADOW E&M-EST. PATIENT-LVL IV: CPT | Mod: PBBFAC,TXP,, | Performed by: NURSE PRACTITIONER

## 2018-09-10 PROCEDURE — 20600001 HC STEP DOWN PRIVATE ROOM: Mod: NTX

## 2018-09-10 PROCEDURE — 85610 PROTHROMBIN TIME: CPT | Mod: TXP

## 2018-09-10 PROCEDURE — 63600175 PHARM REV CODE 636 W HCPCS: Mod: NTX | Performed by: NURSE PRACTITIONER

## 2018-09-10 PROCEDURE — 25000003 PHARM REV CODE 250: Mod: NTX | Performed by: NURSE PRACTITIONER

## 2018-09-10 PROCEDURE — 36415 COLL VENOUS BLD VENIPUNCTURE: CPT | Mod: NTX

## 2018-09-10 PROCEDURE — 83735 ASSAY OF MAGNESIUM: CPT | Mod: NTX

## 2018-09-10 PROCEDURE — P9047 ALBUMIN (HUMAN), 25%, 50ML: HCPCS | Mod: JG,NTX | Performed by: NURSE PRACTITIONER

## 2018-09-10 PROCEDURE — 84100 ASSAY OF PHOSPHORUS: CPT | Mod: NTX

## 2018-09-10 PROCEDURE — 85610 PROTHROMBIN TIME: CPT | Mod: 91,NTX

## 2018-09-10 PROCEDURE — 85025 COMPLETE CBC W/AUTO DIFF WBC: CPT | Mod: TXP

## 2018-09-10 PROCEDURE — 99215 OFFICE O/P EST HI 40 MIN: CPT | Mod: S$GLB,,, | Performed by: NURSE PRACTITIONER

## 2018-09-10 PROCEDURE — 85025 COMPLETE CBC W/AUTO DIFF WBC: CPT | Mod: 91,NTX

## 2018-09-10 PROCEDURE — 80053 COMPREHEN METABOLIC PANEL: CPT | Mod: 91,NTX

## 2018-09-10 RX ORDER — ONDANSETRON 8 MG/1
8 TABLET, ORALLY DISINTEGRATING ORAL EVERY 8 HOURS PRN
Status: DISCONTINUED | OUTPATIENT
Start: 2018-09-10 | End: 2018-09-12 | Stop reason: HOSPADM

## 2018-09-10 RX ORDER — SODIUM BICARBONATE 650 MG/1
1300 TABLET ORAL 2 TIMES DAILY
Status: DISCONTINUED | OUTPATIENT
Start: 2018-09-10 | End: 2018-09-12 | Stop reason: HOSPADM

## 2018-09-10 RX ORDER — INSULIN ASPART 100 [IU]/ML
9 INJECTION, SOLUTION INTRAVENOUS; SUBCUTANEOUS
Status: DISCONTINUED | OUTPATIENT
Start: 2018-09-11 | End: 2018-09-10

## 2018-09-10 RX ORDER — ENTECAVIR 0.5 MG/1
0.5 TABLET, FILM COATED ORAL DAILY
Status: DISCONTINUED | OUTPATIENT
Start: 2018-09-11 | End: 2018-09-12 | Stop reason: HOSPADM

## 2018-09-10 RX ORDER — IBUPROFEN 200 MG
16 TABLET ORAL
Status: DISCONTINUED | OUTPATIENT
Start: 2018-09-10 | End: 2018-09-12 | Stop reason: HOSPADM

## 2018-09-10 RX ORDER — GLUCAGON 1 MG
1 KIT INJECTION
Status: DISCONTINUED | OUTPATIENT
Start: 2018-09-10 | End: 2018-09-12 | Stop reason: HOSPADM

## 2018-09-10 RX ORDER — URSODIOL 300 MG/1
300 CAPSULE ORAL 3 TIMES DAILY
Status: DISCONTINUED | OUTPATIENT
Start: 2018-09-10 | End: 2018-09-12

## 2018-09-10 RX ORDER — LACTULOSE 10 G/15ML
10 SOLUTION ORAL 2 TIMES DAILY
Status: DISCONTINUED | OUTPATIENT
Start: 2018-09-10 | End: 2018-09-12

## 2018-09-10 RX ORDER — INSULIN ASPART 100 [IU]/ML
9 INJECTION, SOLUTION INTRAVENOUS; SUBCUTANEOUS
Status: DISCONTINUED | OUTPATIENT
Start: 2018-09-10 | End: 2018-09-12 | Stop reason: HOSPADM

## 2018-09-10 RX ORDER — ALBUMIN HUMAN 250 G/1000ML
25 SOLUTION INTRAVENOUS EVERY 6 HOURS
Status: COMPLETED | OUTPATIENT
Start: 2018-09-10 | End: 2018-09-10

## 2018-09-10 RX ORDER — ACETAMINOPHEN 325 MG/1
650 TABLET ORAL EVERY 8 HOURS PRN
Status: DISCONTINUED | OUTPATIENT
Start: 2018-09-10 | End: 2018-09-12 | Stop reason: HOSPADM

## 2018-09-10 RX ORDER — MIDODRINE HYDROCHLORIDE 5 MG/1
15 TABLET ORAL EVERY 8 HOURS
Status: DISCONTINUED | OUTPATIENT
Start: 2018-09-10 | End: 2018-09-12 | Stop reason: HOSPADM

## 2018-09-10 RX ORDER — SODIUM CHLORIDE 0.9 % (FLUSH) 0.9 %
3 SYRINGE (ML) INJECTION
Status: DISCONTINUED | OUTPATIENT
Start: 2018-09-10 | End: 2018-09-12 | Stop reason: HOSPADM

## 2018-09-10 RX ORDER — MEGESTROL ACETATE 20 MG/1
20 TABLET ORAL DAILY
Qty: 30 TABLET | Refills: 11 | Status: ON HOLD
Start: 2018-09-10 | End: 2018-09-21 | Stop reason: HOSPADM

## 2018-09-10 RX ORDER — IBUPROFEN 200 MG
24 TABLET ORAL
Status: DISCONTINUED | OUTPATIENT
Start: 2018-09-10 | End: 2018-09-12 | Stop reason: HOSPADM

## 2018-09-10 RX ORDER — MEGESTROL ACETATE 20 MG/1
20 TABLET ORAL DAILY
Status: DISCONTINUED | OUTPATIENT
Start: 2018-09-11 | End: 2018-09-12 | Stop reason: HOSPADM

## 2018-09-10 RX ORDER — INSULIN ASPART 100 [IU]/ML
0-5 INJECTION, SOLUTION INTRAVENOUS; SUBCUTANEOUS
Status: DISCONTINUED | OUTPATIENT
Start: 2018-09-10 | End: 2018-09-12 | Stop reason: HOSPADM

## 2018-09-10 RX ORDER — HEPARIN SODIUM 5000 [USP'U]/ML
5000 INJECTION, SOLUTION INTRAVENOUS; SUBCUTANEOUS EVERY 8 HOURS
Status: DISCONTINUED | OUTPATIENT
Start: 2018-09-10 | End: 2018-09-12 | Stop reason: HOSPADM

## 2018-09-10 RX ADMIN — INSULIN DETEMIR 13 UNITS: 100 INJECTION, SOLUTION SUBCUTANEOUS at 08:09

## 2018-09-10 RX ADMIN — ALBUMIN HUMAN 25 G: 0.25 SOLUTION INTRAVENOUS at 11:09

## 2018-09-10 RX ADMIN — RIFAXIMIN 550 MG: 550 TABLET ORAL at 08:09

## 2018-09-10 RX ADMIN — SODIUM BICARBONATE 650 MG TABLET 1300 MG: at 08:09

## 2018-09-10 RX ADMIN — INSULIN ASPART 9 UNITS: 100 INJECTION, SOLUTION INTRAVENOUS; SUBCUTANEOUS at 07:09

## 2018-09-10 RX ADMIN — URSODIOL 300 MG: 300 CAPSULE ORAL at 08:09

## 2018-09-10 RX ADMIN — ALBUMIN HUMAN 25 G: 0.25 SOLUTION INTRAVENOUS at 07:09

## 2018-09-10 RX ADMIN — MIDODRINE HYDROCHLORIDE 15 MG: 5 TABLET ORAL at 08:09

## 2018-09-10 RX ADMIN — LACTULOSE 10 G: 20 SOLUTION ORAL at 08:09

## 2018-09-10 NOTE — TELEPHONE ENCOUNTER
PATIENT NAME: Scarlett [facility] #: [id number]    Lab Results   Component Value Date    CREATININE 1.4 09/10/2018     (L) 09/10/2018    BILITOT 2.7 (H) 09/10/2018    ALBUMIN 3.5 09/10/2018    INR 1.1 09/10/2018     MELD 23  Encephalopathy: 1 - 2  Ascites: moderate  Dialysis: no     Recertification requestor: Charley Simms

## 2018-09-10 NOTE — ASSESSMENT & PLAN NOTE
- chronic, requires para every 5 days since diuretics on hold for hyponatremia  - last para 9/6 with 8.1L off, no fluid sent for analysis  - schedule for para tomorrow with cell counts/culture  - patient and daughter refused antibiotics at this time and would prefer to wait for result of paracentesis

## 2018-09-10 NOTE — ASSESSMENT & PLAN NOTE
- Listed for liver transplant with MELD 23    MELD-Na score: 21 at 9/10/2018  4:55 PM  MELD score: 13 at 9/10/2018  4:55 PM  Calculated from:  Serum Creatinine: 1.2 mg/dL at 9/10/2018  4:55 PM  Serum Sodium: 128 mmol/L at 9/10/2018  4:55 PM  Total Bilirubin: 2.5 mg/dL at 9/10/2018  4:55 PM  INR(ratio): 1.1 at 9/10/2018  4:55 PM  Age: 68 years

## 2018-09-10 NOTE — SUBJECTIVE & OBJECTIVE
Past Medical History:   Diagnosis Date    Anemia requiring transfusions 8/16/2018    Ascites     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 8/15/2018    Cirrhosis     Cough 8/30/2018    Esophageal varices     Esophageal varices without bleeding 8/15/2018    Hepatic encephalopathy     Hepatitis B     Hepatitis D virus infection     Hypersplenism     Hyponatremia     Hypotension     Liver transplant candidate     Neutropenia     Portal hypertension     Severe malnutrition 8/16/2018    Type 2 diabetes mellitus, with long-term current use of insulin 8/15/2018       Past Surgical History:   Procedure Laterality Date    EGD (ESOPHAGOGASTRODUODENOSCOPY) N/A 8/17/2018    Performed by Travon Delgadillo MD at [facility] ([address] [place] [address])    ESOPHAGOGASTRODUODENOSCOPY N/A 8/17/2018    Procedure: EGD (ESOPHAGOGASTRODUODENOSCOPY);  Surgeon: Travon Delgadillo MD;  Location: [facility] ([address] [place] [address]);  Service: Endoscopy;  Laterality: N/A;       Review of patient's allergies indicates:  No Known Allergies    Family History     None        Tobacco Use    Smoking status: Never Smoker    Smokeless tobacco: Never Used   Substance and Sexual Activity    Alcohol use: No     Frequency: Never    Drug use: No    Sexual activity: Not on file       PTA Medications   Medication Sig    entecavir (BARACLUDE) 0.5 MG Tab Take 1 tablet (0.5 mg total) by mouth once daily.    insulin aspart U-100 (NOVOLOG) 100 unit/mL injection Inject 12 Units into the skin 3 (three) times daily before meals. Plus medium dose sliding scale.    insulin glargine (LANTUS) 100 unit/mL injection Inject 18 Units into the skin every evening.    lactulose (CHRONULAC) 10 gram/15 mL solution Take 15 mLs (10 g total) by mouth 2 (two) times daily. Adjust dose to have 4 bowel movements daily    midodrine (PROAMATINE) 5 MG Tab Take 3 tablets (15 mg total) by mouth every 8 (eight) hours.    rifAXIMin (XIFAXAN) 550 mg Tab Take 1 tablet (550 mg total) by mouth 2  (two) times daily.    sodium bicarbonate 650 MG tablet Take 2 tablets (1,300 mg total) by mouth 2 (two) times daily.    ursodiol (ACTIGALL) 300 mg capsule Take 1 capsule (300 mg total) by mouth 3 (three) times daily.    blood sugar diagnostic (ACCU-CHEK DC) Strp 1 strip by Misc.(Non-Drug; Combo Route) route 3 (three) times daily.    lidocaine-prilocaine (EMLA) cream Apply topically as needed.    megestrol (MEGACE) 20 MG Tab Take 1 tablet (20 mg total) by mouth once daily.       Review of Systems   Constitutional: Positive for appetite change. Negative for chills, fatigue and fever.   Respiratory: Negative for cough, chest tightness and shortness of breath.    Cardiovascular: Negative for chest pain, palpitations and leg swelling.   Gastrointestinal: Positive for abdominal distention, abdominal pain and nausea. Negative for constipation, diarrhea and vomiting.   Genitourinary: Negative for difficulty urinating, dysuria, frequency and urgency.   Musculoskeletal: Negative for back pain and neck pain.   Skin: Negative for color change, pallor, rash and wound.   Neurological: Positive for weakness. Negative for dizziness and headaches.   Psychiatric/Behavioral: Negative for confusion and sleep disturbance.     Objective:     Vital Signs (Most Recent):  Pulse: 92 (09/10/18 1602)  Resp: 18 (09/10/18 1602)  BP: 122/66 (09/10/18 1602)  SpO2: 96 % (09/10/18 1602) Vital Signs (24h Range):  Temp:  [97.8 °F (36.6 °C)] 97.8 °F (36.6 °C)  Pulse:  [] 92  Resp:  [18] 18  SpO2:  [96 %-97 %] 96 %  BP: (116-122)/(66-74) 122/66        There is no height or weight on file to calculate BMI.    No intake or output data in the 24 hours ending 09/10/18 1853    Physical Exam   Constitutional: She is oriented to person, place, and time. She appears well-developed. No distress.   Temporal and distal extremity muscle wasting   HENT:   Head: Normocephalic and atraumatic.   Eyes: Scleral icterus is present.   Cardiovascular: Normal  rate, regular rhythm and normal heart sounds.   Pulmonary/Chest: Effort normal. No respiratory distress. She has decreased breath sounds in the right lower field and the left lower field. She has no rales.   Abdominal: Bowel sounds are normal. She exhibits distension and ascites. There is generalized tenderness.   Musculoskeletal: Normal range of motion. She exhibits edema (trace).   Neurological: She is alert and oriented to person, place, and time.   Skin: Skin is warm and dry. She is not diaphoretic.   Psychiatric: She has a normal mood and affect. Her behavior is normal. Judgment and thought content normal.   Nursing note and vitals reviewed.      Laboratory:  CBC:   Recent Labs   Lab  09/10/18   1655   WBC  6.10   RBC  3.32*   HGB  10.4*   HCT  31.0*   PLT  101*   MCV  93   MCH  31.3*   MCHC  33.5     CMP:   Recent Labs   Lab  09/10/18   1655   GLU  159*   CALCIUM  9.7   ALBUMIN  3.3*   PROT  6.1   NA  128*   K  4.2   CO2  23   CL  93*   BUN  62*   CREATININE  1.2   ALKPHOS  284*   ALT  41   AST  63*   BILITOT  2.5*     Coagulation:   Recent Labs   Lab  09/10/18   1655   INR  1.1     Labs within the past 24 hours have been reviewed.    Diagnostic Results:  I have personally reviewed all pertinent imaging studies.

## 2018-09-10 NOTE — LETTER
September 10, 2018                     [facility]  [address]  [facility] [address]  Phone: [phone]   Patient: Scarlett Reddy   MR Number: [id number]   YOB: 1950   Date of Visit: 9/10/2018       Dear      Thank you for referring Scarlett Reddy to me for evaluation. Attached you will find relevant portions of my assessment and plan of care.    If you have questions, please do not hesitate to call me. I look forward to following Scarlett Reddy along with you.    Sincerely,    Rita Rubin, NP    Enclosure    If you would like to receive this communication electronically, please contact [email or web address] or [phone] to request [employer] Link access.    [employer] Link is a tool which provides read-only access to select patient information with whom you have a relationship. Its easy to use and provides real time access to review your patients record including encounter summaries, notes, results, and demographic information.    If you feel you have received this communication in error or would no longer like to receive these types of communications, please e-mail [email or web address]

## 2018-09-10 NOTE — TELEPHONE ENCOUNTER
Received call from KEEGAN Rubin that patient seen in clinic with abdominal pain 8/10. Tense ascites and hyponatremic.  Patient needs to be admitted.  Patient family admitted to adding salt to patient's food so she will eat despite being instructed about low Na diet by dietician NED Hill. Report called AGA Tineo, PRASHANT.  Dr. Worrell to contact Dr. Burns.

## 2018-09-10 NOTE — HPI
Scarlett Reddy is a 68yr old female with PMH of decompensated cirrhosis due to Hep B c/b ascites requiring weekly paracentesis, hepatic encephalopathy, esophageal varices, and hyponatremia. She is listed for liver transplant with MELD 23. She presented to outpatient clinic today with c/o not feeling well, c/o diffuse abdominal pain that started 2 days ago. Pain is 10/10. She reports pain is similar to previous episodes when she is due for a paracentesis. She reports decreased oral intake due to pain and abdominal fullness. C/o chronic nausea, no vomiting. Last paracentesis was 9/6 and she had 8.1L removed, no fluid analysis on file. She reports multiple BM's daily with lactulose. Na decreased on outpatient labs but likely related to hyperglycemia. Diuretics have remained on hold for hyponatremia and therefore, patient has been requiring paracentesis every 5 days. In addition, patient has been non-compliant with low Na and fluid restricted diet. She is being admitted for abdominal pain and hyponatremia.

## 2018-09-10 NOTE — PROGRESS NOTES
"[facility] VISIT NOTE    CHIEF COMPLAINT: decompensated cirrhosis 2/2 Hep B and D, on liver transplant waitlist     HPI: This is a 68 y.o. 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  Noted h/o variceal bleeding in past notes, small varices noted on recent EGD    Presents today with daughter,  and with [facility]      Patient had been followed in [facility] for cirrhosis, but has been referred to our hospital to establish care due to worsening hyponatremia, ascites, weakness and liver function, need for tertiary care.     Patient is currently on the liver transplant waitlist as of 8/22/18, listed with MELD 23 since today, expires 10/10/18  MELD-Na score: 23 at 9/10/2018  9:21 AM  MELD score: 14 at 9/10/2018  9:21 AM  Calculated from:  Serum Creatinine: 1.4 mg/dL at 9/10/2018  9:21 AM  Serum Sodium: 126 mmol/L at 9/10/2018  9:21 AM  Total Bilirubin: 2.7 mg/dL at 9/10/2018  9:21 AM  INR(ratio): 1.1 at 9/10/2018  9:21 AM  Age: 68 years     Interval HPI:  Pt reports not "feeling well at all". Reports diffuse abd pain that started 2 days ago, rates abd pain 10/10 most days, some intermittent episodes of abd pain "improvement" which she rates a 8/10. + chronic nausea, no vomiting. Last para 4 days ago with 8.1 L removed, no fluid analysis on file. Reports + shortness of breath, abdominal fullness. Denies any subjective fever, chills. Hyponatremia worsening on labs today. Drinking approx 500 mL of fluid daily per pt report. + tachycardia on vitals. Of note, after last visit, despite extensive counseling by myself and dietician, patient's family started adding salt to her food and is also eating pre-prepared soups often, so very likely significantly over 2g of Na intake, which likely worsening ascites accumulation.       Recent issues:   -- tense ascites: + abd pain at rest, " worsens to palpation, tense, last para 4 days ago. Not on diuretics due to hyponatremia. NOT following low Na diet despite extensive counseling (started adding Na to food because pt did not like the taste of low Na foods)  -- h/o PVT on Enoxaparin: stopped while hospitalized, per hospital med note on 8/21/18, - hold lovenox due to thrombocytopenia while hospitalized. Discussed with Dr. Worrell after last visit, will repeat US in 1 month (end Sept) then determine if needs anticoagulation   -- Ascites requiring weekly para: last 9/6/18, removed 8.1L, no WBC and Diff on file   -- Malnutrition: reports lack of appetite (chronic), intermittent nausea, poor intake of protein. Appt last week with NED Hill RD, message sent to NED Kumar NP for nutritional prehab. Dr. Worrell started Megace after last visit but pt has not started yet   -- Pruritis : reports itching continues despite start of Chiquis TID  -- Hyponatremia : worsening, off diuretics      Lab Results   Component Value Date    ALT 45 (H) 09/10/2018    AST 65 (H) 09/10/2018    ALKPHOS 299 (H) 09/10/2018    BILITOT 2.7 (H) 09/10/2018    ALBUMIN 3.5 09/10/2018    INR 1.1 09/10/2018     (L) 09/10/2018       Hep C testing negative 8/15/2018  Hep B studies:  -- + sAg, DNA 79   -- negative sAb  -- On Entecavir 0.5 mg daily   -- discussed above lab values with Dr. Worrell, ok to continue current Entecavir dose     Presented to IR conference 7/24/18 to evaluate if transplant candidate   Plan: Thrombus from MPV to bifurcation; but potentially still doable.  Ok to come for evaluation    Complications:   1. Ascites - requiring weekly para. No diuretics post hospitalization for hyponatremia. NOT following Low Na diet, still adding Na to food and eating pre-prepared soups often, so very likely above 2g Na daily  Visit with NEHA Hill completed last week   2. HE - taking Lactulose 30 ml TID, 3-4 BM daily, + diffuse abd pain   On Xifaxan BID. Denies Confusion or  disorientation  3. H/o Varices bleeding per previous notes - last EGD with grade 1 varices, no active bleeding    Cirrhosis Health Maintenance:   -- Last EGD - 8/2018 grade 1 varices, repeat due 8/2019  -- HCC screening   MRI 8/2018 no lesions, next due 2/2019   AFP 2.9 8/2018, next due 2/2019  -- + Immunity to Hep A per labs    Denies current alcohol consumption     Denies jaundice, dark urine, hematemesis, melena, slowed mentation. No abnormal skin rashes. No generalized joint or muscle pain.     Review of patient's allergies indicates:  No Known Allergies        Current Outpatient Medications:     blood sugar diagnostic (ACCU-CHEK DC) Strp, 1 strip by Misc.(Non-Drug; Combo Route) route 3 (three) times daily., Disp: , Rfl:     entecavir (BARACLUDE) 0.5 MG Tab, Take 1 tablet (0.5 mg total) by mouth once daily., Disp: 30 tablet, Rfl: 3    insulin aspart U-100 (NOVOLOG) 100 unit/mL injection, Inject 12 Units into the skin 3 (three) times daily before meals. Plus medium dose sliding scale., Disp: 15 mL, Rfl: 3    insulin glargine (LANTUS) 100 unit/mL injection, Inject 18 Units into the skin every evening., Disp: 15 mL, Rfl: 3    lactulose (CHRONULAC) 10 gram/15 mL solution, Take 15 mLs (10 g total) by mouth 2 (two) times daily. Adjust dose to have 4 bowel movements daily, Disp: 4000 mL, Rfl: 3    megestrol (MEGACE) 20 MG Tab, Take 1 tablet (20 mg total) by mouth once daily., Disp: 30 tablet, Rfl: 11    midodrine (PROAMATINE) 5 MG Tab, Take 3 tablets (15 mg total) by mouth every 8 (eight) hours., Disp: 270 tablet, Rfl: 5    rifAXIMin (XIFAXAN) 550 mg Tab, Take 1 tablet (550 mg total) by mouth 2 (two) times daily., Disp: 60 tablet, Rfl: 5    sodium bicarbonate 650 MG tablet, Take 2 tablets (1,300 mg total) by mouth 2 (two) times daily., Disp: 120 tablet, Rfl: 11    ursodiol (ACTIGALL) 300 mg capsule, Take 1 capsule (300 mg total) by mouth 3 (three) times daily., Disp: 90 capsule, Rfl: 2     lidocaine-prilocaine (EMLA) cream, Apply topically as needed., Disp: 30 g, Rfl: 1      PMHX:  has a past medical history of Anemia requiring transfusions (8/16/2018), Ascites, 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 (8/15/2018), Cirrhosis, Cough (8/30/2018), Esophageal varices, Esophageal varices without bleeding (8/15/2018), Hepatic encephalopathy, Hepatitis B, Hepatitis D virus infection, Hypersplenism, Hyponatremia, Hypotension, Liver transplant candidate, Neutropenia, Portal hypertension, Severe malnutrition (8/16/2018), and Type 2 diabetes mellitus, with long-term current use of insulin (8/15/2018).    PSHX:  has a past surgical history that includes EGD (ESOPHAGOGASTRODUODENOSCOPY) (N/A, 8/17/2018).    FAMILY HISTORY: Negative for liver disease, reviewed in [facility]    SOCIAL HISTORY:   Social History     Tobacco Use   Smoking Status Unknown If Ever Smoked       Social History     Substance and Sexual Activity   Alcohol Use No    Frequency: Never       Social History     Substance and Sexual Activity   Drug Use No       ROS:   GENERAL: Denies fever, chills, weight loss/gain, + chronic fatigue  HEENT: Denies headaches, dizziness, vision/hearing changes  CARDIOVASCULAR: Denies chest pain, palpitations, + mild edema  RESPIRATORY: + SOB, likely due to abd distension, denies cough  GI: + diffuse abd pain (10/10), Denies rectal bleeding, vomiting. +nausea  No change in bowel pattern or color  : Denies dysuria, hematuria   SKIN: Denies rash,  + itching, not relieved or improved yet with Chiquis additon last week   NEURO: Denies confusion, memory loss, or mood changes  PSYCH: Denies depression or anxiety  HEME/LYMPH: + easy bruising, denies easy bleeding    PHYSICAL EXAM:   Friendly White female, Chronically ill-appearing. Malnourished. Temporal wasting. alert and oriented to person, place and time  VITALS: /74 (BP Location: Right arm, Patient Position: Sitting, BP Method: Medium (Automatic))   Pulse  "(!) 113   Temp 97.8 °F (36.6 °C) (Oral)   Resp 18   Ht 5' 3.78" (1.62 m)   Wt 62.3 kg (137 lb 5.6 oz)   SpO2 97%   BMI 23.74 kg/m²   HENT: Normocephalic, without obvious abnormality. Oral mucosa pink and moist. Dentition good.  EYES: Sclerae mildly icteric. No conjunctival pallor.   NECK:  No masses or cervical adenopathy.  CARDIOVASCULAR: tachycardia, regular rhythm. No murmurs.  RESPIRATORY: Normal respiratory effort. BBS CTA, decreased in bases. No wheezes or crackles.  GI: Soft, + tenderness to palpation to generalized abd, worsened in RUQ and RLQ, distended and tense, + ascites. No masses palpable.   EXTREMITIES:  No clubbing, cyanosis, +1 ankle edema.  SKIN: Warm and dry. No jaundice. No rashes noted to exposed skin but multiple areas of excoriations/scabs noted to BUE from scratching. .+ telangectasias noted. No palmar erythema.   NEURO:  Normal gait ambulating in clinic room, arrived to visit in wheelchair due to long walk to clinic. No asterixis.  PSYCH:  Memory intact. Thought and speech pattern appropriate. Behavior normal. No depression or anxiety noted.    RECENT LABS:    Hepatitis A and B immunity markers:    Hepatitis A Antibody IgG   Date Value Ref Range Status   08/15/2018 Positive (A)  Final       Hepatitis B Surface Ag   Date Value Ref Range Status   08/15/2018 Positive (A)  Final     Hep B Core Total Ab   Date Value Ref Range Status   08/15/2018 Positive (A)  Final     Hep B S Ab   Date Value Ref Range Status   08/15/2018 Negative  Final       There is no immunization history on file for this patient.    Labs:  Lab Results   Component Value Date    WBC 6.25 09/10/2018    HGB 10.3 (L) 09/10/2018    HCT 32.3 (L) 09/10/2018     (L) 09/10/2018     (L) 09/10/2018    K 4.1 09/10/2018    CREATININE 1.4 09/10/2018    ALT 45 (H) 09/10/2018    AST 65 (H) 09/10/2018    ALKPHOS 299 (H) 09/10/2018    BILITOT 2.7 (H) 09/10/2018    ALBUMIN 3.5 09/10/2018    INR 1.1 09/10/2018    AFP 2.9 " 08/15/2018       DIAGNOSTIC STUDIES:  EGD-  Done 8/17/2018, repeat 8/2019  Impression:           - Small hiatal hernia.                        - Grade I esophageal varices.                        - Portal hypertensive gastropathy.                        - Normal examined duodenum.                        - No active bleeding.                        - No specimens collected.    COLONOSCOPY-  ABD. U/S-  Done 8/15/18  FINDINGS:  The visualized portion of the pancreas is unremarkable.    The liver is normal in size measuring 12.4 cm.  The liver demonstrates nodular contour and heterogeneous echotexture.  No focal hepatic lesions are seen.    The gallbladder demonstrates a 1.0 cm mobile gallstone in the gallbladder neck, multiple punctate hyper echogenic foci in the gallbladder wall suggestive of small adherent stones or adenomyomatosis, and a 0.7 cm focal isoechoic region near the fundus which may represent adherent sludge or polyp.  The common duct is not dilated, measuring 3 mm.  The gallbladder wall is not thickened.  There is no sonographic Burgess sign.  No dilated intrahepatic radicles are seen.    The spleen is enlarged measuring 12.9 x 4.8 cm with a homogeneous echotexture.    The aorta tapers normally.    The kidneys are normal in size without focal abnormality or evidence of hydronephrosis.    There is moderate volume ascites.    The main portal vein, right portal vein, left portal vein, middle hepatic vein, right hepatic vein, left hepatic vein, SMV, and IVC are patent with proper directional flow.  The main hepatic artery is patent with normal waveform.  The umbilical vein is not patent.  The Celiac artery is normal on expiration with a velocity of 178 cm/sec.      Impression       Satisfactory Doppler ultrasound of the liver.    Cirrhotic liver with evidence of portal hypertension including splenomegaly and ascites.    Cholelithiasis, possible adenomyomatosis, and adherent sludge versus gallbladder polyp.   Recommend continued ultrasound surveillance for possible polyp.       MRI-  Done 8/16/18  FINDINGS:  Liver: Heterogeneous hepatic parenchyma with nodular contour compatible with history of cirrhosis.  Subtle heterogeneous enhancement likely relating to regenerative nodules and fibrosis.  No discrete lesion identified.  Hepatic and portal veins are patent.    Biliary: Gallbladder contains a gallstone at the gallbladder neck.  No intrahepatic or extrahepatic biliary dilatation.    Pancreas: Unremarkable.  No pancreatic ductal dilatation.    Spleen: Enlarged in size with left upper quadrant collateral vessel formation.  No focal lesions.    Adrenal glands: Unremarkable.    Kidneys: Bilateral renal cortical cysts.  No renal masses.  No hydronephrosis.    Miscellaneous: Visualized bowel loops are unremarkable.  No evidence for lymphadenopathy.  Moderate volume ascites.    Small left pleural effusion.      Impression       Cirrhotic liver without discrete lesion.    Findings compatible with sequela of portal hypertension including splenomegaly, left upper quadrant collateral vessel formation and abdominal ascites.    Cholelithiasis.    Small left pleural effusion.       LIVER BIOPSY- none     ASSESSMENT:  68 y.o. White female with:  1.  Cirrhosis, decompensated, due to Hep B with Delta.    -- MELD-Na score: 23 at 9/10/2018  9:21 AM  MELD score: 14 at 9/10/2018  9:21 AM  Calculated from:  Serum Creatinine: 1.4 mg/dL at 9/10/2018  9:21 AM  Serum Sodium: 126 mmol/L at 9/10/2018  9:21 AM  Total Bilirubin: 2.7 mg/dL at 9/10/2018  9:21 AM  INR(ratio): 1.1 at 9/10/2018  9:21 AM  Age: 68 years  -- HCC screening: AFP and abd. U/S.. Repeat due 2/2019  -- + Immunity to Hep A   -- EGD in past, next due 8/2019     2. Tense ascites  -- diffuse abd pain, pt rating 10/10. Started 2 days ago and worsening in past 24 hours  -- diuretic therapy on hold due to hyponatremia  -- last para removed 8.1 L on 9/6/18, now with tense ascites,  "tender to palpation     3. Hep B with delta  Hep B studies:  -- + sAg, DNA 79  -- negative sAb  -- On Entecavir 0.5 mg daily   Discussed with Dr. Worrell will continue above dose of Entecavir    4. Elevated alk phos, pruritis  -- started on Chiquis TID last week, not improving itching per pt report, can address while hospitalized or as outpatient at next visit    5. Malnutrition  -- appt completed with Rayna Hill RD next week  -- may also benefit from NED Kumar NP prehab visits  - + temporal wasting, poor PO intake (low protein, likely high Na despite counseling to decrease Na to 2000 mg daily)    6. Hyponatremia  -- off diuretics, Na worsening (126)  -- fluid restriction 1 L, following restriction, intaking approx 500 ml daily based on fluid recall from pt   -- likely also worsened by uncontrolled BG    7. Hepatic encephalopathy   -- taking Lactulose 30 ml TID, having 3-4 BM daily  -- on Xifaxan BID   -- Denies Confusion or disorientation    8. Pulmonary nodule   -- per last ID note while hospitalized " In view of patient now being listed for liver transplant, increased procalcitonin and CT chest findings, recommend pulmonology consult for evaluation of left sided pleural effusion"  -- cryptogen ab ordered per ID negative  -- Recommend surveillance CT chest in 3 months to monitor pulmonary nodules seen on CT (11/2018), can repeat if not transplanted by then   -- discussed above recommendations with Dr. Worrell, pleural effusion likely r/t ascites, no need for pulmonology referral at this time, can repeat surveillance if not transplanted in 3 months    9. h/o Portal vein thrombosis   - per ID conference note: Thrombus from MPV to bifurcation; but potentially still doable.  Ok to come for evaluation  -- previously on Lovenox, discontinued while hospitalized due to pancytopenia, note per hospital med on 8/21/18: hold lovenox due to thrombocytopenia  -- Discussed with Dr. Worrell after last visit, will repeat US in 1 month " "(end Sept) then determine if needs anticoagulation     10. Chronic neuropathy  -- reports in upper and lower extremities, unchanged from past neuropathy  -- likely from DM but other etiologies possible  -- discussed with pt if wishes w/u can refer to neurology. Discouraged use of Magnesium to "treat" neuropathy given past Mg level WNL and can increase GI discomfort    11. Hypoalbuminemia   -- encouraged high protein diet, increase Premier Protein to at least 2 shakes daily, message previously sent to NED Kumar NP to inquire about scheudling nutritional prehab visit     12. Type 2 DM, uncontrolled  -- uncontrolled post prandial excursions, increased Novolog to 12 units AC at last visit, no BG logs in visit today  -- had appt with endo Dr. Galicia tomorrow but will need to be rescheduled given hospital admit today   -- may be source of worsening neuropathy     13. Hypotension, stable   -- on Midodrine 15 mg TID    14. Worsening neuropathy, BLE and BUE  -- discussed that very likely may be due to uncontrolled BG  -- pt requesting referral to neurology, referral placed at last visit    EDUCATION:     All education completed with [facility] provided     The disease process and manifestations of cirrhosis were discussed.    Discussed implications of a cirrhosis diagnosis with HCC screenings and EGD and why it is important for us to properly monitor for potential complications.     Signs and symptoms of hepatic decompensation were reviewed, including jaundice, ascites, and slowed mentation due to hepatic encephalopathy. The patient should seek medical attention if any of these things occur.  We discussed the potential for bleeding from esophageal varices with symptoms of hematemesis and melena. The patient should report to the Emergency Department for these symptoms.    We discussed the increased risk of hepatocellular carcinoma due to cirrhosis. Continued screening every six months with ultrasound and AFP is " recommended, discussed with patient.     Cirrhosis Counseling  - strict abstinence of alcohol use (includes beer, wine, and/or liquor)  - compliance with lactulose (adjust to have 3-4 BM daily) and rifaximin twice daily for treatment of hepatic encephalopathy  - avoid non-steroidal anti-inflammatory drugs (NSAIDs) such as ibuprofen, Motrin, naprosyn, Alleve due to the risk of kidney damage  - can take acetaminophen (Tylenol), no more than 2000 mg per day  - low sodium (salt) 2 gram per day diet  - high protein diet: recommend 90  grams per day to prevent muscle mass loss. Recommended at least 2 protein shake daily using Premier Protein shakes    - resistance exercises for muscle strength  - avoid raw seafoods due to the risk of fatal Vibrio vulnificus infection  - ultrasound of the liver every 6 months for liver cancer screening  - Upper endoscopy every 1-2 years to screen for varices in the stomach and esophagus      PLAN:  1. Direct admit (discussed with Dr. Worrell) for hyponatremia, diffuse abd pain, tense ascites  2. Labs twice weekly for now, clinic appts weekly (upcoming appt Dr Worrell on 9/20/18)  3. Continue Chiquis 300 mg TID for itching, elevated alk phos   4. Continue Lactulose (to titrate to 3-4 BM daily),   5. Establish care with neurology for neuropathy evaluation   6. Recommend surveillance CT chest in 3 months to monitor pulmonary nodules seen on CT (11/2018), can repeat if not transplanted by then   7. Continue paracentesis weekly (needs WBC and Diff with each para), likely needs increased frequency of paracentesis  8. Continue Lactulose, reiterated adjusting to obtain 3-4 BM daily, pt to determine needed dose. Continue Xifaxan 550 mg BID  9. Visit to be rescheduled with NED Hill RD tomorrow (per pt request) for label reading in English (for protein and NA intake, requested by herbert)   10. Message previously sent for possible prehab visit for nutritional support with NED Kumar NP, visit with S.  Sergio SOLOMON last week.    11. Continue to hold all Diuretic therapy due to hyponatremia  12. HCC screening Q 6 months with AFP and abd. U/S - both next due 2/2019  13. EGD to screen for varices, next due 8/2019  14. Abd US with doppler in 1 month (end Sept 2018) to reassess PV, given h/o PVT on Lovenox  15. Continue Entecavir 0.5 mg daily   16. May be able to submit for exception points for ascites, based on MELD trend   17. Cirrhosis counseling as noted above and discussed with patient and family   18. Recommend surveillance CT chest in 3 months to monitor pulmonary nodules seen on CT (11/2018), can repeat if not transplanted by then   19. Follow-up in 10 days (on 9/20/2018). as scheduled, then Dr. Worrell 9/20/18 as scheduled, labs twice weekly for now     Note routed to Dr. Worrell, pre-liver transplant pool and message sent to Guillermina Vidales RN with above notes    Thank you for allowing me to participate in the care of Scarlett Reddy    Rita Rubin, NP-C    CC'ed note to:   VALENTINA Nino Dr.     Functional Status: 60% - Requires occasional assistance but is able to care for needs  Physical Capacity: Limited Mobility - using wheelchair to attend multiple appts, using walker or cane at home for stability. Able to walk ~50 ft in clinic without assistance

## 2018-09-10 NOTE — ASSESSMENT & PLAN NOTE
- Na 126 on outpatient labs likely related to non-compliance and hyperglycemia  - repeat Na 128.  - continue fluid restriction, holding diuretics, albumin 25% x 2 doses. Continue to monitor.

## 2018-09-10 NOTE — Clinical Note
Is going to miss her endo appt tomorrow so may benefit from endo consult if she is there more than a day or so? I adjusted her insulin at her last visit but she didn't have logs with her today to adjust again Rita

## 2018-09-11 PROBLEM — R07.9 CHEST PAIN: Status: ACTIVE | Noted: 2018-09-11

## 2018-09-11 LAB
ALBUMIN FLD-MCNC: 0.7 G/DL
ALBUMIN SERPL BCP-MCNC: 3.7 G/DL
ALP SERPL-CCNC: 206 U/L
ALT SERPL W/O P-5'-P-CCNC: 28 U/L
ANION GAP SERPL CALC-SCNC: 13 MMOL/L
APPEARANCE FLD: NORMAL
AST SERPL-CCNC: 51 U/L
BASOPHILS # BLD AUTO: 0.02 K/UL
BASOPHILS NFR BLD: 0.5 %
BILIRUB SERPL-MCNC: 2.5 MG/DL
BODY FLD TYPE: NORMAL
BODY FLUID SOURCE, LDH: NORMAL
BUN SERPL-MCNC: 58 MG/DL
CALCIUM SERPL-MCNC: 9.7 MG/DL
CHLORIDE SERPL-SCNC: 94 MMOL/L
CK MB SERPL-MCNC: 1.5 NG/ML
CK MB SERPL-RTO: 5 %
CK SERPL-CCNC: 30 U/L
CO2 SERPL-SCNC: 21 MMOL/L
COLOR FLD: NORMAL
CREAT SERPL-MCNC: 1.1 MG/DL
DIFFERENTIAL METHOD: ABNORMAL
EOSINOPHIL # BLD AUTO: 0.1 K/UL
EOSINOPHIL NFR BLD: 2.6 %
ERYTHROCYTE [DISTWIDTH] IN BLOOD BY AUTOMATED COUNT: 17.6 %
EST. GFR  (AFRICAN AMERICAN): 59.2 ML/MIN/1.73 M^2
EST. GFR  (NON AFRICAN AMERICAN): 51.3 ML/MIN/1.73 M^2
GLUCOSE SERPL-MCNC: 108 MG/DL
GRAM STN SPEC: NORMAL
GRAM STN SPEC: NORMAL
HCT VFR BLD AUTO: 24.4 %
HGB BLD-MCNC: 8 G/DL
IMM GRANULOCYTES # BLD AUTO: 0.04 K/UL
IMM GRANULOCYTES NFR BLD AUTO: 1 %
INR PPP: 1.1
LDH FLD L TO P-CCNC: 34 U/L
LYMPHOCYTES # BLD AUTO: 0.4 K/UL
LYMPHOCYTES NFR BLD: 10.2 %
LYMPHOCYTES NFR FLD MANUAL: 25 %
MAGNESIUM SERPL-MCNC: 2.1 MG/DL
MCH RBC QN AUTO: 31.1 PG
MCHC RBC AUTO-ENTMCNC: 32.8 G/DL
MCV RBC AUTO: 95 FL
MONOCYTES # BLD AUTO: 0.5 K/UL
MONOCYTES NFR BLD: 13.4 %
MONOS+MACROS NFR FLD MANUAL: 18 %
NEUTROPHILS # BLD AUTO: 2.8 K/UL
NEUTROPHILS NFR BLD: 72.3 %
NEUTROPHILS NFR FLD MANUAL: 57 %
NRBC BLD-RTO: 0 /100 WBC
PLATELET # BLD AUTO: 67 K/UL
PMV BLD AUTO: 10.9 FL
POCT GLUCOSE: 123 MG/DL (ref 70–110)
POCT GLUCOSE: 126 MG/DL (ref 70–110)
POCT GLUCOSE: 330 MG/DL (ref 70–110)
POTASSIUM SERPL-SCNC: 3.7 MMOL/L
PROT FLD-MCNC: 1.7 G/DL
PROT SERPL-MCNC: 5.8 G/DL
PROTHROMBIN TIME: 11.4 SEC
RBC # BLD AUTO: 2.57 M/UL
SODIUM SERPL-SCNC: 128 MMOL/L
SODIUM UR-SCNC: <20 MMOL/L
SPECIMEN SOURCE: NORMAL
SPECIMEN SOURCE: NORMAL
TROPONIN I SERPL DL<=0.01 NG/ML-MCNC: 0.02 NG/ML
WBC # BLD AUTO: 3.81 K/UL
WBC # FLD: 138 /CU MM

## 2018-09-11 PROCEDURE — 99233 SBSQ HOSP IP/OBS HIGH 50: CPT | Mod: NTX,,, | Performed by: NURSE PRACTITIONER

## 2018-09-11 PROCEDURE — 82042 OTHER SOURCE ALBUMIN QUAN EA: CPT | Mod: NTX

## 2018-09-11 PROCEDURE — 84300 ASSAY OF URINE SODIUM: CPT | Mod: NTX

## 2018-09-11 PROCEDURE — 87070 CULTURE OTHR SPECIMN AEROBIC: CPT | Mod: NTX

## 2018-09-11 PROCEDURE — 85610 PROTHROMBIN TIME: CPT | Mod: NTX

## 2018-09-11 PROCEDURE — 83615 LACTATE (LD) (LDH) ENZYME: CPT | Mod: NTX

## 2018-09-11 PROCEDURE — 87075 CULTR BACTERIA EXCEPT BLOOD: CPT | Mod: NTX

## 2018-09-11 PROCEDURE — 85025 COMPLETE CBC W/AUTO DIFF WBC: CPT | Mod: NTX

## 2018-09-11 PROCEDURE — 63600175 PHARM REV CODE 636 W HCPCS: Mod: JG,NTX | Performed by: NURSE PRACTITIONER

## 2018-09-11 PROCEDURE — 83735 ASSAY OF MAGNESIUM: CPT | Mod: NTX

## 2018-09-11 PROCEDURE — 87205 SMEAR GRAM STAIN: CPT | Mod: NTX

## 2018-09-11 PROCEDURE — 93005 ELECTROCARDIOGRAM TRACING: CPT | Mod: NTX

## 2018-09-11 PROCEDURE — 80053 COMPREHEN METABOLIC PANEL: CPT | Mod: NTX

## 2018-09-11 PROCEDURE — 89051 BODY FLUID CELL COUNT: CPT | Mod: NTX

## 2018-09-11 PROCEDURE — 93010 ELECTROCARDIOGRAM REPORT: CPT | Mod: NTX,,, | Performed by: INTERNAL MEDICINE

## 2018-09-11 PROCEDURE — 87102 FUNGUS ISOLATION CULTURE: CPT | Mod: NTX

## 2018-09-11 PROCEDURE — 0W9G3ZZ DRAINAGE OF PERITONEAL CAVITY, PERCUTANEOUS APPROACH: ICD-10-PCS | Performed by: SURGERY

## 2018-09-11 PROCEDURE — 36415 COLL VENOUS BLD VENIPUNCTURE: CPT | Mod: NTX

## 2018-09-11 PROCEDURE — 63600175 PHARM REV CODE 636 W HCPCS: Mod: JG,NTX | Performed by: SURGERY

## 2018-09-11 PROCEDURE — 20600001 HC STEP DOWN PRIVATE ROOM: Mod: NTX

## 2018-09-11 PROCEDURE — 84484 ASSAY OF TROPONIN QUANT: CPT | Mod: NTX

## 2018-09-11 PROCEDURE — 82553 CREATINE MB FRACTION: CPT | Mod: NTX

## 2018-09-11 PROCEDURE — P9047 ALBUMIN (HUMAN), 25%, 50ML: HCPCS | Mod: JG,NTX | Performed by: SURGERY

## 2018-09-11 PROCEDURE — 82550 ASSAY OF CK (CPK): CPT | Mod: NTX

## 2018-09-11 PROCEDURE — 99223 1ST HOSP IP/OBS HIGH 75: CPT | Mod: NTX,,, | Performed by: NURSE PRACTITIONER

## 2018-09-11 PROCEDURE — 63600175 PHARM REV CODE 636 W HCPCS: Mod: NTX | Performed by: NURSE PRACTITIONER

## 2018-09-11 PROCEDURE — 84157 ASSAY OF PROTEIN OTHER: CPT | Mod: NTX

## 2018-09-11 PROCEDURE — P9045 ALBUMIN (HUMAN), 5%, 250 ML: HCPCS | Mod: JG,NTX | Performed by: NURSE PRACTITIONER

## 2018-09-11 PROCEDURE — 25000003 PHARM REV CODE 250: Mod: NTX | Performed by: NURSE PRACTITIONER

## 2018-09-11 RX ORDER — ALBUMIN HUMAN 250 G/1000ML
SOLUTION INTRAVENOUS
Status: DISPENSED
Start: 2018-09-11 | End: 2018-09-12

## 2018-09-11 RX ORDER — ALBUMIN HUMAN 50 G/1000ML
12.5 SOLUTION INTRAVENOUS ONCE
Status: COMPLETED | OUTPATIENT
Start: 2018-09-11 | End: 2018-09-11

## 2018-09-11 RX ORDER — ALBUMIN HUMAN 250 G/1000ML
37.5 SOLUTION INTRAVENOUS ONCE
Status: COMPLETED | OUTPATIENT
Start: 2018-09-11 | End: 2018-09-11

## 2018-09-11 RX ADMIN — URSODIOL 300 MG: 300 CAPSULE ORAL at 03:09

## 2018-09-11 RX ADMIN — INSULIN ASPART 2 UNITS: 100 INJECTION, SOLUTION INTRAVENOUS; SUBCUTANEOUS at 12:09

## 2018-09-11 RX ADMIN — INSULIN ASPART 9 UNITS: 100 INJECTION, SOLUTION INTRAVENOUS; SUBCUTANEOUS at 12:09

## 2018-09-11 RX ADMIN — INSULIN DETEMIR 13 UNITS: 100 INJECTION, SOLUTION SUBCUTANEOUS at 08:09

## 2018-09-11 RX ADMIN — RIFAXIMIN 550 MG: 550 TABLET ORAL at 08:09

## 2018-09-11 RX ADMIN — LACTULOSE 10 G: 20 SOLUTION ORAL at 08:09

## 2018-09-11 RX ADMIN — URSODIOL 300 MG: 300 CAPSULE ORAL at 08:09

## 2018-09-11 RX ADMIN — INSULIN ASPART 9 UNITS: 100 INJECTION, SOLUTION INTRAVENOUS; SUBCUTANEOUS at 08:09

## 2018-09-11 RX ADMIN — MIDODRINE HYDROCHLORIDE 15 MG: 5 TABLET ORAL at 02:09

## 2018-09-11 RX ADMIN — SODIUM BICARBONATE 650 MG TABLET 1300 MG: at 08:09

## 2018-09-11 RX ADMIN — ALBUMIN HUMAN 12.5 G: 0.05 INJECTION, SOLUTION INTRAVENOUS at 03:09

## 2018-09-11 RX ADMIN — INSULIN ASPART 4 UNITS: 100 INJECTION, SOLUTION INTRAVENOUS; SUBCUTANEOUS at 06:09

## 2018-09-11 RX ADMIN — MIDODRINE HYDROCHLORIDE 15 MG: 5 TABLET ORAL at 05:09

## 2018-09-11 RX ADMIN — ALBUMIN (HUMAN) 37.5 G: 25 SOLUTION INTRAVENOUS at 11:09

## 2018-09-11 RX ADMIN — MIDODRINE HYDROCHLORIDE 15 MG: 5 TABLET ORAL at 08:09

## 2018-09-11 RX ADMIN — ENTECAVIR 0.5 MG: 0.5 TABLET, FILM COATED ORAL at 08:09

## 2018-09-11 RX ADMIN — INSULIN ASPART 9 UNITS: 100 INJECTION, SOLUTION INTRAVENOUS; SUBCUTANEOUS at 06:09

## 2018-09-11 NOTE — PLAN OF CARE
Problem: Patient Care Overview  Goal: Plan of Care Review  Outcome: Ongoing (interventions implemented as appropriate)  Pt AAOx4, afebrile. Pt returned from para with chest pn, hypotension, was tachycardic (see previous note), 6.9L removed, 150 of albumin given in IR. Pt fluid restriction has decreased to 1000mL, still remains on low Na diet. Another dose of albumin was administered today after returning from IR, pt has no more complaints of chest pn. Still awaiting urine specimen. I have called lab 2x ab the outstanding troponin & CK-MB. Call light within reach, bed in low/locked position, non-slip socks on when OOB, will continue to monitor

## 2018-09-11 NOTE — PROGRESS NOTES
[facility]  Liver Transplant  Progress Note    Patient Name: Scarlett Reddy  MRN: [id number]  Admission Date: 9/10/2018  Hospital Length of Stay: 1 days  Code Status: Full Code  Primary Care Provider: Primary Doctor No    Subjective:     History of Present Illness:  Scarlett Reddy is a 68yr old female with PMH of decompensated cirrhosis due to Hep B c/b ascites requiring weekly paracentesis, hepatic encephalopathy, esophageal varices, and hyponatremia. She is listed for liver transplant with MELD 23. She presented to outpatient clinic today with c/o not feeling well, c/o diffuse abdominal pain that started 2 days ago. Pain is 10/10. She reports pain is similar to previous episodes when she is due for a paracentesis. She reports decreased oral intake due to pain and abdominal fullness. C/o chronic nausea, no vomiting. Last paracentesis was 9/6 and she had 8.1L removed, no fluid analysis on file. She reports multiple BM's daily with lactulose. Na decreased on outpatient labs but likely related to hyperglycemia. Diuretics have remained on hold for hyponatremia and therefore, patient has been requiring paracentesis every 5 days. In addition, patient has been non-compliant with low Na and fluid restricted diet. She is being admitted for abdominal pain and hyponatremia.       Hospital Course:  Interval history: pt doing ok today. Abdomen distended and tense. Paracentesis today with 6.9 L removed. Wbc/cultures pending. Albumin 25% 150 ml given with para. Pt returned to floor c/o chest discomfort with hypotension and tachycardia, 98/51 . EKG and cardiac enzymes pending. Albumin 5% 250 ml ordered for hydration. Monitor.     Scheduled Meds:   albumin human 25%        entecavir  0.5 mg Oral Daily    heparin (porcine)  5,000 Units Subcutaneous Q8H    insulin aspart U-100  9 Units Subcutaneous TIDWM    insulin detemir U-100  13 Units Subcutaneous QHS    lactulose  10 g Oral BID    megestrol  20 mg Oral  Daily    midodrine  15 mg Oral Q8H    rifAXIMin  550 mg Oral BID    sodium bicarbonate  1,300 mg Oral BID    ursodiol  300 mg Oral TID     Continuous Infusions:  PRN Meds:acetaminophen, dextrose 50%, dextrose 50%, glucagon (human recombinant), glucose, glucose, insulin aspart U-100, ondansetron, sodium chloride 0.9%    Review of Systems   Constitutional: Positive for appetite change. Negative for chills, fatigue and fever.   HENT: Negative for congestion and facial swelling.    Eyes: Negative for pain, discharge and visual disturbance.   Respiratory: Negative for cough, chest tightness and shortness of breath.    Cardiovascular: Negative for chest pain, palpitations and leg swelling.   Gastrointestinal: Positive for abdominal distention, abdominal pain and nausea. Negative for constipation, diarrhea and vomiting.   Endocrine: Negative.    Genitourinary: Negative for difficulty urinating, dysuria, frequency and urgency.   Musculoskeletal: Negative for back pain and neck pain.   Skin: Negative for color change, pallor, rash and wound.   Allergic/Immunologic: Negative for immunocompromised state.   Neurological: Positive for weakness. Negative for dizziness and headaches.   Psychiatric/Behavioral: Negative for confusion and sleep disturbance.     Objective:     Vital Signs (Most Recent):  Temp: 97.9 °F (36.6 °C) (09/11/18 1217)  Pulse: (!) 113 (09/11/18 1217)  Resp: 17 (09/11/18 1217)  BP: (!) 98/51 (09/11/18 1217)  SpO2: 98 % (09/11/18 1217) Vital Signs (24h Range):  Temp:  [97.9 °F (36.6 °C)-98.5 °F (36.9 °C)] 97.9 °F (36.6 °C)  Pulse:  [] 113  Resp:  [15-18] 17  SpO2:  [94 %-98 %] 98 %  BP: ()/(51-66) 98/51     Weight: 62.4 kg (137 lb 9.1 oz)  Body mass index is 24.37 kg/m².    Intake/Output - Last 3 Shifts       09/09 0700 - 09/10 0659 09/10 0700 - 09/11 0659 09/11 0700 - 09/12 0659    P.O.  180     I.V. (mL/kg)  200 (3.2)     Other  0     Total Intake(mL/kg)  380 (6.1)     Urine (mL/kg/hr)  200 75  (0.1)    Emesis/NG output  0     Other  0     Stool  0     Blood  0     Total Output  200 75    Net  +180 -75           Urine Occurrence  1 x     Stool Occurrence  2 x     Emesis Occurrence  0 x           Physical Exam   Constitutional: She is oriented to person, place, and time. She appears well-developed. No distress.   Temporal and distal extremity muscle wasting   HENT:   Head: Normocephalic and atraumatic.   Eyes: Scleral icterus is present.   Cardiovascular: Normal rate, regular rhythm and normal heart sounds.   Pulmonary/Chest: Effort normal. No respiratory distress. She has decreased breath sounds in the right lower field and the left lower field. She has no rales.   Abdominal: Bowel sounds are normal. She exhibits distension and ascites. There is generalized tenderness.   Musculoskeletal: Normal range of motion. She exhibits edema (trace).   Neurological: She is alert and oriented to person, place, and time.   Skin: Skin is warm and dry. She is not diaphoretic.   Psychiatric: She has a normal mood and affect. Her behavior is normal. Judgment and thought content normal.   Nursing note and vitals reviewed.      Laboratory:  Immunosuppressants     None        CBC:   Recent Labs   Lab  09/11/18 0427   WBC  3.81*   RBC  2.57*   HGB  8.0*   HCT  24.4*   PLT  67*   MCV  95   MCH  31.1*   MCHC  32.8     CMP:   Recent Labs   Lab  09/11/18 0427   GLU  108   CALCIUM  9.7   ALBUMIN  3.7   PROT  5.8*   NA  128*   K  3.7   CO2  21*   CL  94*   BUN  58*   CREATININE  1.1   ALKPHOS  206*   ALT  28   AST  51*   BILITOT  2.5*     Labs within the past 24 hours have been reviewed.    Diagnostic Results:  I have personally reviewed all pertinent imaging studies.    Assessment/Plan:     * Ascites    - chronic, requires para every 5 days since diuretics on hold for hyponatremia.  - last para 9/6 with 8.1L off, no fluid sent for analysis.  - paracentesis today with 6.9 L removed. Wbc/culture pending.  - patient and daughter  refused antibiotics at this time and would prefer to wait for result of paracentesis.          Chest pain    - chest pain post paracentesis.  - cardiac enzymes and EKG pending.           Portal vein thrombosis    - previously on lovenox but stopped 8/21/18 due to thrombocytopenia.  - Plan to repeat abd US in 1 month (end of September) to determine if needs anticoagulation.        Hyponatremia    - Na 126 on outpatient labs likely related to non-compliance and hyperglycemia  - Na 128 today.  - continue 1L fluid restriction, holding diuretics. Albumin 5% 250 ml. Continue to monitor.        Severe malnutrition    - dietary consulted due to non-compliance with diet  - continue megace, boost supplements. Monitor.         Esophageal varices without bleeding    - last EGD 8/17/18, small non-bleeding varices found.  - H&H stable. No s/s of active bleeding.        Type 2 diabetes mellitus with diabetic polyneuropathy, with long-term current use of insulin    - hyperglycemic. Endocrine consulted.         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    - Listed for liver transplant with MELD 23.    MELD-Na score: 20 at 9/11/2018  4:27 AM  MELD score: 12 at 9/11/2018  4:27 AM  Calculated from:  Serum Creatinine: 1.1 mg/dL at 9/11/2018  4:27 AM  Serum Sodium: 128 mmol/L at 9/11/2018  4:27 AM  Total Bilirubin: 2.5 mg/dL at 9/11/2018  4:27 AM  INR(ratio): 1.1 at 9/11/2018  4:27 AM  Age: 68 years        Hepatic encephalopathy    - chronic.  - continue rifaximin and lactulose. Monitor.            VTE Risk Mitigation (From admission, onward)        Ordered     heparin (porcine) injection 5,000 Units  Every 8 hours      09/10/18 1642     IP VTE HIGH RISK PATIENT  Once      09/10/18 1642          The patients clinical status was discussed at multidisplinary rounds, involving transplant surgery, transplant medicine, pharmacy, nursing, nutrition, and social work    Discharge Planning: not a candidate for dc at this time.        Kimber Valle NP  Liver Transplant  [facility]

## 2018-09-11 NOTE — CONSULTS
[facility]  Endocrinology  Diabetes Consult Note    Consult Requested by: Yony Burns MD   Reason for admit: Ascites    HISTORY OF PRESENT ILLNESS:  Reason for Consult: Management of T2DM, Hyperglycemia     Diabetes diagnosis year: 2013    Lab Results   Component Value Date    HGBA1C 6.8 (H) 08/16/2018       Home Diabetes Medications:  Lantus 18 q HS, Novolog 12 units AC, correction scale Novolog    How often checking glucose at home? >4 x day   BG readings on regimen: 140's am fasting, 180-200's pre lunch and dinner  Hypoglycemia on the regimen?  No - hypo awareness at high 80's  Missed doses on regimen?  No    Diabetes Complications include: neuropathy likely related to diabetes    Complicating diabetes co morbidities:   CIRRHOSIS      HPI:   Patient is a 68 y.o. female with a diagnosis of DM2, decompensated cirrhosis, hepatic encephalopathy, esophageal varices, and hyponatremia. She is listed for liver transplant, admitted for abdominal pain and hyponatremia.  Report personal history of DM, positive family history of DM (mother).  Endocrine consulted for DM management.              Interval HPI:   Events: BG controlled overnight.  Paracentesis this am, reports episode of dizziness and tachycardia post procedure.  Eating:   < 25% - c/o abdominal discomfort  Nausea: No  Hypoglycemia and intervention: No  Fever: No  TPN and/or TF: No    PMH, PSH, FH, SH reviewed     ROS:    Review of Systems     REVIEW OF SYSTEMS  Constitutional: Positive for weight changes r/t fluid volume status.  Eyes: Negative for visual disturbance.  Respiratory: Negative for cough.   Cardiovascular: Negative for chest pain.  Gastrointestinal: Negative for nausea.  Endocrine: Negative for polyuria, polydipsia.  Musculoskeletal: Negative for back pain.  Skin: Reports bilat upper and lower ext rash r/t itching  Neurological: Negative for syncope. Complains of upper and lower ext peripheral neuropathy    Current Medications  and/or Treatments Impacting Glycemic Control  Immunotherapy:    Immunosuppressants     None        Steroids:   Hormones (From admission, onward)    None        Pressors:    Autonomic Drugs (From admission, onward)    Start     Stop Route Frequency Ordered    09/10/18 2200  midodrine tablet 15 mg      -- Oral Every 8 hours 09/10/18 1647        Hyperglycemia/Diabetes Medications:   Antihyperglycemics (From admission, onward)    Start     Stop Route Frequency Ordered    09/10/18 2100  insulin detemir U-100 pen 13 Units      -- SubQ Nightly 09/10/18 1649    09/10/18 1930  insulin aspart U-100 pen 9 Units      -- SubQ 3 times daily with meals 09/10/18 1920    09/10/18 1745  insulin aspart U-100 pen 0-5 Units      -- SubQ Before meals & nightly PRN 09/10/18 1646           PHYSICAL EXAMINATION:  Vitals:    09/11/18 1217   BP: (!) 98/51   Pulse: (!) 113   Resp: 17   Temp: 97.9 °F (36.6 °C)     Body mass index is 24.37 kg/m².    Physical Exam    Constitutional:  Thin, appears uncomfortable.  ENT: External ears no masses with nose patent; normal hearing.   Neck:  Supple; trachea midline  Cardiovascular: Normal heart sounds, 1+ bilat LE edema.     Lungs:  Normal effort; lungs anterior bilaterally clear to auscultation.  Abdomen:  Distended, soft, no masses, no hernias.   MS: No clubbing or cyanosis of nails noted; unable to assess gait.  Skin: No lesions, or ulcers; no nodules.  Intermittent scarring noted to bilat upper and lower ext  Psychiatric: Good judgement and insight; normal mood and affect.        Labs Reviewed and Include   Recent Labs   Lab  09/11/18   0427   GLU  108   CALCIUM  9.7   ALBUMIN  3.7   PROT  5.8*   NA  128*   K  3.7   CO2  21*   CL  94*   BUN  58*   CREATININE  1.1   ALKPHOS  206*   ALT  28   AST  51*   BILITOT  2.5*     Lab Results   Component Value Date    WBC 3.81 (L) 09/11/2018    HGB 8.0 (L) 09/11/2018    HCT 24.4 (L) 09/11/2018    MCV 95 09/11/2018    PLT 67 (L) 09/11/2018     No results for  input(s): TSH, FREET4 in the last 168 hours.  Lab Results   Component Value Date    HGBA1C 6.8 (H) 08/16/2018       Nutritional status:   Body mass index is 24.37 kg/m².  Lab Results   Component Value Date    ALBUMIN 3.7 09/11/2018    ALBUMIN 3.3 (L) 09/10/2018    ALBUMIN 3.5 09/10/2018     No results found for: PREALBUMIN    Estimated Creatinine Clearance: 39.9 mL/min (based on SCr of 1.1 mg/dL).    Accu-Checks  Recent Labs      09/10/18   1751  09/10/18   1932  09/10/18   2331  09/11/18   0804   POCTGLUCOSE  153*  194*  138*  123*        ASSESSMENT and PLAN    * Ascites    Paracentesis 9/11/18  Managed per primary team  Avoid hypoglycemia        Type 2 diabetes mellitus with diabetic polyneuropathy, with long-term current use of insulin    Current BG goal 140-180    Continue Levemir 13 units Q hs  Prandial Novolog 9 units with meals  Low dose correction scale insulin    Discharge planning:  Likely d/c on MDI, assess for changes in insulin requirements        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    Managed per primary team  avoid hypoglycemia              Plan discussed with patient and family at bedside.    Patient's primary language is Syriac, H&P and ROS obtained via certified  (Olga) at bedside.    Seen in conjunction with KEEGAN Osborn NP  Endocrinology  [facility]

## 2018-09-11 NOTE — SUBJECTIVE & OBJECTIVE
Interval HPI:   Events: BG controlled overnight.  Paracentesis this am, reports episode of dizziness and tachycardia post procedure.  Eating:   < 25% - c/o abdominal discomfort  Nausea: No  Hypoglycemia and intervention: No  Fever: No  TPN and/or TF: No    PMH, PSH, FH, SH reviewed     ROS:    Review of Systems     REVIEW OF SYSTEMS  Constitutional: Positive for weight changes r/t fluid volume status.  Eyes: Negative for visual disturbance.  Respiratory: Negative for cough.   Cardiovascular: Negative for chest pain.  Gastrointestinal: Negative for nausea.  Endocrine: Negative for polyuria, polydipsia.  Musculoskeletal: Negative for back pain.  Skin: Reports bilat upper and lower ext rash r/t itching  Neurological: Negative for syncope. Complains of upper and lower ext peripheral neuropathy    Current Medications and/or Treatments Impacting Glycemic Control  Immunotherapy:    Immunosuppressants     None        Steroids:   Hormones (From admission, onward)    None        Pressors:    Autonomic Drugs (From admission, onward)    Start     Stop Route Frequency Ordered    09/10/18 2200  midodrine tablet 15 mg      -- Oral Every 8 hours 09/10/18 1647        Hyperglycemia/Diabetes Medications:   Antihyperglycemics (From admission, onward)    Start     Stop Route Frequency Ordered    09/10/18 2100  insulin detemir U-100 pen 13 Units      -- SubQ Nightly 09/10/18 1649    09/10/18 1930  insulin aspart U-100 pen 9 Units      -- SubQ 3 times daily with meals 09/10/18 1920    09/10/18 1745  insulin aspart U-100 pen 0-5 Units      -- SubQ Before meals & nightly PRN 09/10/18 1646           PHYSICAL EXAMINATION:  Vitals:    09/11/18 1217   BP: (!) 98/51   Pulse: (!) 113   Resp: 17   Temp: 97.9 °F (36.6 °C)     Body mass index is 24.37 kg/m².    Physical Exam    Constitutional:  Thin, appears uncomfortable.  ENT: External ears no masses with nose patent; normal hearing.   Neck:  Supple; trachea midline  Cardiovascular: Normal heart  sounds, 1+ bilat LE edema.     Lungs:  Normal effort; lungs anterior bilaterally clear to auscultation.  Abdomen:  Distended, soft, no masses, no hernias.   MS: No clubbing or cyanosis of nails noted; unable to assess gait.  Skin: No lesions, or ulcers; no nodules.  Intermittent scarring noted to bilat upper and lower ext  Psychiatric: Good judgement and insight; normal mood and affect.

## 2018-09-11 NOTE — HPI
Reason for Consult: Management of T2DM, Hyperglycemia     Diabetes diagnosis year: 2013    Lab Results   Component Value Date    HGBA1C 6.8 (H) 08/16/2018       Home Diabetes Medications:  Lantus 18 q HS, Novolog 12 units AC, correction scale Novolog    How often checking glucose at home? >4 x day   BG readings on regimen: 140's am fasting, 180-200's pre lunch and dinner  Hypoglycemia on the regimen?  No - hypo awareness at high 80's  Missed doses on regimen?  No    Diabetes Complications include: neuropathy likely related to diabetes    Complicating diabetes co morbidities:   CIRRHOSIS      HPI:   Patient is a 68 y.o. female with a diagnosis of DM2, decompensated cirrhosis, hepatic encephalopathy, esophageal varices, and hyponatremia. She is listed for liver transplant, admitted for abdominal pain and hyponatremia.  Report personal history of DM, positive family history of DM (mother).  Endocrine consulted for DM management.

## 2018-09-11 NOTE — ASSESSMENT & PLAN NOTE
- previously on lovenox but stopped 8/21/18 due to thrombocytopenia  - Plan to repeat abd US in 1 month (end of September) to determine if needs anticoagulation

## 2018-09-11 NOTE — ASSESSMENT & PLAN NOTE
Current BG goal 140-180    Continue Levemir 13 units Q hs  Prandial Novolog 9 units with meals  Low dose correction scale insulin    Discharge planning:  Likely d/c on MDI, assess for changes in insulin requirements

## 2018-09-11 NOTE — PROCEDURES
Radiology Post-Procedure Note    Pre Op Diagnosis: Ascites  Post Op Diagnosis: Same    Procedure: Ultrasound Guided Paracentesis    Procedure performed by: Juan Luis ALLISON, Elizabeth     Written Informed Consent Obtained: Yes  Specimen Removed: YES peach colored  Estimated Blood Loss: Minimal    Findings:   Successful paracentesis.  Albumin administered PRN per protocol.    Patient tolerated procedure well.    Elizabeth Mcknight, APRN, FNP  Interventional Radiology  [phone] spectralink

## 2018-09-11 NOTE — CONSULTS
[facility]  Adult Nutrition  Consult Note     SUMMARY       Recommendations  Recommendation/Intervention:   1. Encourage good po intake   2. Continue diet order   3.Dietitian will continue to monitor     Goals:   Patient to meet >85%EEN/EPN    Nutrition Goal Status: new  Communication of RD Recs: (POC)    General Information Comments: Consult received Pre OLTX ESLD, noncompliant with low Na diet. Per 18, RD Note Low Na diet education reinforced. Pt with PMH of decompensated cirrhosis due to Hep B c/b ascites requiring biweekly paracentesis. Language barrier pt and family speaks Liberian and Tamazight. Tamazight . Scheduled for paracentesis today. Tolerating diet, <50% intake. Consumes Boost Plus-strawberry. Provided Low Na education. NFPE Completed 18- Severe Malnutrition in the context of Chronic Illness/Injury    Nutrition Discharge Planningm Na education provided    Reason for Assessment  Reason for Assessment: consult  Diagnosis: transplant/postoperative complications(Pre OLTx ESLD, Ascities)  1. Hyponatremia    2. Other ascites    3. 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    4. Secondary esophageal varices without bleeding    5. Hepatic encephalopathy    6. Portal vein thrombosis    7. Severe malnutrition    8. Type 2 diabetes mellitus with diabetic polyneuropathy, with long-term current use of insulin    9. Chest pain    10. Ascites    11. Chest pain, unspecified type    12. Esophageal varices without bleeding, unspecified esophageal varices type      Relevant Medical History: Cirrhosis, DM, Hep B, Hyponatremia  Anemia requiring transfusions 2018   Ascites    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 8/15/2018   Cirrhosis    Cough 2018   Esophageal varices    Esophageal varices without bleeding 8/15/2018   Hepatic encephalopathy    Hepatitis B    Hepatitis D virus infection    Hypersplenism    Hyponatremia    Hypotension    Liver  "transplant candidate    Neutropenia    Portal hypertension    Severe malnutrition 2018   Type 2 diabetes mellitus, with long-term current use of insulin 8/15/2018     Interdisciplinary Rounds: attended    Nutrition Risk Screen  Nutrition Risk Screen: cultural or Orthodox food preferences    Nutrition/Diet History  Patient Reported Diet/Restrictions/Preferences: low salt, diabetic diet  Typical Food/Fluid Intake: Noncompliant w/Low Na Diet  Food Preferences: Cultural Preferences Noted  Do you have any cultural, spiritual, Orthodox conflicts, given your current situation?: Cultural Preferences Noted  Supplemental Drinks or Food Habits: Boost Plus, Ensure Plus  Food Allergies: NKFA  Factors Affecting Nutritional Intake: decreased appetite, abdominal distention, cultural/Orthodox beliefs    Anthropometrics  Temp: 97.9 °F (36.6 °C)  Height: 5' 3" (160 cm)  Height (inches): 63 in  Weight Method: Standard Scale  Weight: 62.4 kg (137 lb 9.1 oz)  Weight (lb): 137.57 lb  Ideal Body Weight (IBW), Female: 115 lb  % Ideal Body Weight, Female (lb): 119.63 lb  BMI (Calculated): 24.4  BMI Grade: 18.5-24.9 - normal  Usual Body Weight (UBW), k kg (135-150lbs)  % Usual Body Weight: 99.25  % Weight Change From Usual Weight: -0.95 %    Lab/Procedures/Meds  Labs: Reviewed    (L) 2018    K 3.7 2018    BUN 58 (H) 2018    CREATININE 1.1 2018    PHOS 4.5 09/10/2018    MG 2.1 2018    ESTGFRAFRICA 59.2 (A) 2018    EGFRNONAA 51.3 (A) 2018    ALBUMIN 3.7 2018      ALT 28 2018    AST 51 (H) 2018    ALKPHOS 206 (H) 2018     POCT Glucose   Date Value Ref Range Status   2018 123 (H) 70 - 110 mg/dL Final   09/10/2018 138 (H) 70 - 110 mg/dL Final   09/10/2018 194 (H) 70 - 110 mg/dL Final   09/10/2018 153 (H) 70 - 110 mg/dL Final     Meds: Reviewed  Scheduled Meds:   albumin human 25%        entecavir  0.5 mg Oral Daily    heparin (porcine)  5,000 Units " Subcutaneous Q8H    insulin aspart U-100  9 Units Subcutaneous TIDWM    insulin detemir U-100  13 Units Subcutaneous QHS    lactulose  10 g Oral BID    megestrol  20 mg Oral Daily    midodrine  15 mg Oral Q8H    rifAXIMin  550 mg Oral BID    sodium bicarbonate  1,300 mg Oral BID    ursodiol  300 mg Oral TID     Physical Findings/Assessment  Overall Physical Appearance: loss of muscle mass, loss of subcutaneous fat  Oral/Mouth Cavity: tooth/teeth missing  Skin: incision(s), edema    Estimated/Assessed Needs  Weight Used For Calorie Calculations: 62.4 kg (137 lb 9.1 oz)  Energy Calorie Requirements (kcal): 7288-8869  Energy Need Method: Kcal/kg(25-30kcal/kg)  Protein Requirements: 87(1.4 gm/kg)  Weight Used For Protein Calculations: 62.4 kg (137 lb 9.1 oz)  Fluid Requirements (mL): Per MD  Fluid Need Method: (1 ml/kcal or per MD)  RDA Method (mL): 1560     Nutrition Prescription Ordered  Current Diet Order: Low Na 2gm  Nutrition Order Comments: 1000ml FR  Oral Nutrition Supplement: Boost Plus - strawberry    Evaluation of Received Nutrient/Fluid Intake in last 24h  I/O: +180ml since admit  Energy Calories Required: meeting needs  Protein Required: meeting needs  Comments: LBM 9/11/18  % Intake of Estimated Energy Needs: 25 - 50 %  % Meal Intake: 25 - 50 %    Nutrition Risk  Level of Risk/Frequency of Follow-up: low(1x week)     Assessment and Plan  NFPE Completed 8/16/18  Malnutrition in the context of Chronic Illness/Injury     Related to (etiology):  Liver fx, liver transplant work-up     Signs and Symptoms (as evidenced by):  Energy Intake: <50% of estimated energy requirement for 6 months  Body Fat Depletion: severe depletion of orbitals and triceps   Muscle Mass Depletion: severe depletion of temples, clavicle region and lower extremities   Weight Loss: 5% x 6 months     Nutrition Diagnosis Status:  New    Monitor and Evaluation  Food and Nutrient Intake: energy intake, food and beverage intake  Food and  Nutrient Adminstration: diet order  Physical Activity and Function: nutrition-related ADLs and IADLs  Anthropometric Measurements: weight change, weight  Biochemical Data, Medical Tests and Procedures: lipid profile, inflammatory profile, glucose/endocrine profile, gastrointestinal profile, electrolyte and renal panel  Nutrition-Focused Physical Findings: overall appearance     Nutrition Follow-Up  RD Follow-up?: Yes

## 2018-09-11 NOTE — ASSESSMENT & PLAN NOTE
- previously on lovenox but stopped 8/21/18 due to thrombocytopenia.  - Plan to repeat abd US in 1 month (end of September) to determine if needs anticoagulation.

## 2018-09-11 NOTE — PROGRESS NOTES
Para completed, pt tolerated well. No apparent distress noted. 6.9 Liters removed from left abd, mepore applied CDI. Labs collected and sent. Albumin 150 ml given per protocol.  Report called to VALENTINA Astorga. Pt awaiting transport, accompanied by family and transporter.

## 2018-09-11 NOTE — PLAN OF CARE
Problem: Patient Care Overview  Goal: Plan of Care Review  Outcome: Ongoing (interventions implemented as appropriate)  AAOx3, afebrile, c/o pain. Pain in abdominal region. Pt refused pain medication. BG monitored ACHS and tx per orders. ACHSAlbumin x2 doses. Plan for endocrine and nutrition to be consulted today. Plan for paracentesis today. Pt able to position self independently in bed. x1 person assist OOB. Pt refused heparin injection. Pt in lowest position, side rails up x2, non-skid foot wear in place, call light within reach, pt verbalized understanding to call RN when needed. Daughter at bedside. Hand hygiene practiced per protocol. Will continue to monitor.

## 2018-09-11 NOTE — SUBJECTIVE & OBJECTIVE
Scheduled Meds:   albumin human 25%        entecavir  0.5 mg Oral Daily    heparin (porcine)  5,000 Units Subcutaneous Q8H    insulin aspart U-100  9 Units Subcutaneous TIDWM    insulin detemir U-100  13 Units Subcutaneous QHS    lactulose  10 g Oral BID    megestrol  20 mg Oral Daily    midodrine  15 mg Oral Q8H    rifAXIMin  550 mg Oral BID    sodium bicarbonate  1,300 mg Oral BID    ursodiol  300 mg Oral TID     Continuous Infusions:  PRN Meds:acetaminophen, dextrose 50%, dextrose 50%, glucagon (human recombinant), glucose, glucose, insulin aspart U-100, ondansetron, sodium chloride 0.9%    Review of Systems   Constitutional: Positive for appetite change. Negative for chills, fatigue and fever.   HENT: Negative for congestion and facial swelling.    Eyes: Negative for pain, discharge and visual disturbance.   Respiratory: Negative for cough, chest tightness and shortness of breath.    Cardiovascular: Negative for chest pain, palpitations and leg swelling.   Gastrointestinal: Positive for abdominal distention, abdominal pain and nausea. Negative for constipation, diarrhea and vomiting.   Endocrine: Negative.    Genitourinary: Negative for difficulty urinating, dysuria, frequency and urgency.   Musculoskeletal: Negative for back pain and neck pain.   Skin: Negative for color change, pallor, rash and wound.   Allergic/Immunologic: Negative for immunocompromised state.   Neurological: Positive for weakness. Negative for dizziness and headaches.   Psychiatric/Behavioral: Negative for confusion and sleep disturbance.     Objective:     Vital Signs (Most Recent):  Temp: 97.9 °F (36.6 °C) (09/11/18 1217)  Pulse: (!) 113 (09/11/18 1217)  Resp: 17 (09/11/18 1217)  BP: (!) 98/51 (09/11/18 1217)  SpO2: 98 % (09/11/18 1217) Vital Signs (24h Range):  Temp:  [97.9 °F (36.6 °C)-98.5 °F (36.9 °C)] 97.9 °F (36.6 °C)  Pulse:  [] 113  Resp:  [15-18] 17  SpO2:  [94 %-98 %] 98 %  BP: ()/(51-66) 98/51     Weight:  62.4 kg (137 lb 9.1 oz)  Body mass index is 24.37 kg/m².    Intake/Output - Last 3 Shifts       09/09 0700 - 09/10 0659 09/10 0700 - 09/11 0659 09/11 0700 - 09/12 0659    P.O.  180     I.V. (mL/kg)  200 (3.2)     Other  0     Total Intake(mL/kg)  380 (6.1)     Urine (mL/kg/hr)  200 75 (0.1)    Emesis/NG output  0     Other  0     Stool  0     Blood  0     Total Output  200 75    Net  +180 -75           Urine Occurrence  1 x     Stool Occurrence  2 x     Emesis Occurrence  0 x           Physical Exam   Constitutional: She is oriented to person, place, and time. She appears well-developed. No distress.   Temporal and distal extremity muscle wasting   HENT:   Head: Normocephalic and atraumatic.   Eyes: Scleral icterus is present.   Cardiovascular: Normal rate, regular rhythm and normal heart sounds.   Pulmonary/Chest: Effort normal. No respiratory distress. She has decreased breath sounds in the right lower field and the left lower field. She has no rales.   Abdominal: Bowel sounds are normal. She exhibits distension and ascites. There is generalized tenderness.   Musculoskeletal: Normal range of motion. She exhibits edema (trace).   Neurological: She is alert and oriented to person, place, and time.   Skin: Skin is warm and dry. She is not diaphoretic.   Psychiatric: She has a normal mood and affect. Her behavior is normal. Judgment and thought content normal.   Nursing note and vitals reviewed.      Laboratory:  Immunosuppressants     None        CBC:   Recent Labs   Lab  09/11/18 0427   WBC  3.81*   RBC  2.57*   HGB  8.0*   HCT  24.4*   PLT  67*   MCV  95   MCH  31.1*   MCHC  32.8     CMP:   Recent Labs   Lab  09/11/18 0427   GLU  108   CALCIUM  9.7   ALBUMIN  3.7   PROT  5.8*   NA  128*   K  3.7   CO2  21*   CL  94*   BUN  58*   CREATININE  1.1   ALKPHOS  206*   ALT  28   AST  51*   BILITOT  2.5*     Labs within the past 24 hours have been reviewed.    Diagnostic Results:  I have personally reviewed all  pertinent imaging studies.

## 2018-09-11 NOTE — HOSPITAL COURSE
Interval history: pt doing ok today. Abdomen distended and tense. Paracentesis today with 6.9 L removed. Wbc/cultures pending. Albumin 25% 150 ml given with para. Pt returned to floor c/o chest discomfort with hypotension and tachycardia, 98/51 . EKG and cardiac enzymes pending. Albumin 5% 250 ml ordered for hydration. Monitor.

## 2018-09-11 NOTE — PROGRESS NOTES
Admit Note     Met with , daughter and Tamazight , Zacarias from [employer], to assess patients needs due to patient finally sleeping soundly.  Patient is a 68 y.o.  female, admitted ascites .     Patient admitted from ED on 9/10/2018 .  At this time, patient presents as pleasant, calm, cooperative and asking and answering questions appropriately.  At this time, patients caregiver presents as alert and oriented x 4, pleasant, good eye contact, well groomed, average intelligence, calm, communicative, cooperative and asking and answering questions appropriately.      Household/Family Systems (as reported by patients caregiver)     Patient resides with patient's , at [address].  Support system includes  Andrés Ramirez whose primary language is Nigerian, but does understand and speak Tamazight. Patient's son, Justin Ramirez, is a physician in [employer] [facility] [facility] [place].   Patient's daughter Angela is stayling locally from [employer] [facility] [place] and plans to return on 9/26/18.  There are two other daughters home in [facility] [place], Terri and Gabriela who were supposed to take turns with Angela staying with patient and patient's .  Patient does not have dependents that are need of being cared for.     Patients primary caregiver is Andrés Ramirez, patients , phone number [phone], speaks Nigerian primary and some Tamazight.  Patient's daughter Angela is here, speaks Tamazight,phone [phone]..  Confirmed patients contact information is [phone] (home);     Telephone Information:   Mobile [phone]       During admission, patient's caregiver plans to stay [facility] Room 674.  Confirmed patient and patients caregivers do have access to reliable transportation.    Cognitive Status/Learning     Patients caregiver reports patients reading ability as college and states patient does have difficulty with weakness and does not speak English.  .  Patients caregiver reports patient learns  best by in Latvian language verbal or hands on.   Needed: Yes; primary language is  Latvian.  Patient's caregiver informed of  services available and how to access services..   Highest education level: Associate/Bachelor Degree    Vocation/Disability (as reported by patients caregiver)    Working for Income: No  If no, reason not working: Patient Choice - Retired  Patient is retired from teaching school.    Adherence     Patients caregiver reports patient has a high level of adherence to patients health care regimen.  Adherence counseling and education provided.  Patient's caregiver verbalizes understanding.    Substance Use    Patients caregiver reports patients substance usage as the following:    Tobacco: none, patient denies any use.  Alcohol: none, patient denies any use.  Illicit Drugs/Non-prescribed Medications: none, patient denies any use.  Patients caregiver states clear understanding of the potential impact of substance use.  Substance abstinence/cessation counseling, education and resources provided and reviewed.     Services Utilizing/ADLS (as reported by patients caregiver)    Infusion Service: Prior to admission, patient utilizing? no  Home Health: Prior to admission, patient utilizing? no  DME: Prior to admission, walker and wc to borrow from [employer]  Pulmonary/Cardiac Rehab: Prior to admission, no  Dialysis:  Prior to admission, no  Transplant Specialty Pharmacy:  Prior to admission, no.    Prior to admission, patients caregiver reports patient was somewhat independent with ADLS, requiring assistance and was not driving.  Patients caregiver reports patient is not able to care for self at this time due to compromised medical condition (as documented in medical record) and physical weakness..  Patients caregiver reports patient indicates a willingness to care for self once medically cleared to do so.    Insurance/Medications    Insured by   Payor/Plan Subscr   Sex Relation Sub. Ins. ID Effective Group Num   1. L[employer] NETW* MERCEDES PATHAK 1950 Female  [id number] 8/3/18                                    [id number] [id number]      Primary Insurance (for UNOS reporting): Private Insurance- CM is Diane Rodríguez RN  Secondary Insurance (for UNOS reporting): None    Patients caregiver reports patient is able to obtain and afford medications at this time and at time of discharge.    Living Will/Healthcare Power of     Patients caregiver reports patient does not have a LW and/or HCPA.   provided education regarding LW and HCPA and the completion of forms.    Coping/Mental Health (as reported by patients caregiver)    Patient is coping well with the aid of  family members, friends and blaine. Patient has no mental health concerns.  Patients caregiver is coping well with the aid of  family members and blaine. Psychosoical Education provided today.     Discharge Planning (as reported by patients caregiver)    At time of discharge, patient plans to return to [facility] under the care of  and daughter Angela. Patient's daughter will need to return to [facility]. [employer] is working to find a suitable caregiver in the [facility] community that can assist patient's spouse when there daughter Angela returns home. Patients  and daughter will transport patient.  Per rounds today, expected discharge date has not been medically determined at this time. Patients caretaker verbalizes understanding and is involved in treatment planning and discharge process.    Additional Concerns    Patient is being followed for needs, education, resources, information, emotional support, supportive counseling, and for supportive and skilled discharge plan of care.  providing ongoing psychosocial support, education, resources and d/c planning as needed.  SW remains available. Patient's caregiver verbalizes understanding and agreement with  information reviewed,  availability and how to access available resources as needed.

## 2018-09-11 NOTE — ASSESSMENT & PLAN NOTE
- Listed for liver transplant with MELD 23.    MELD-Na score: 20 at 9/11/2018  4:27 AM  MELD score: 12 at 9/11/2018  4:27 AM  Calculated from:  Serum Creatinine: 1.1 mg/dL at 9/11/2018  4:27 AM  Serum Sodium: 128 mmol/L at 9/11/2018  4:27 AM  Total Bilirubin: 2.5 mg/dL at 9/11/2018  4:27 AM  INR(ratio): 1.1 at 9/11/2018  4:27 AM  Age: 68 years

## 2018-09-11 NOTE — PT/OT/SLP PROGRESS
Physical Therapy      Patient Name:  Scarlett Reddy   MRN:  [id number]    Patient not seen today secondary to (At 1st attempt, pt off floor for paracentesis. At 2nd attempt, RN reported that pt had just returned to floor from para and was reporting chest pain.). Will follow-up at next scheduled session as able.    Radha Nguyen, PT, DPT   9/11/2018  [phone]

## 2018-09-11 NOTE — ASSESSMENT & PLAN NOTE
- dietary consulted due to non-compliance with diet  - continue megace, boost supplements. Monitor.

## 2018-09-11 NOTE — ASSESSMENT & PLAN NOTE
- chronic, requires para every 5 days since diuretics on hold for hyponatremia.  - last para 9/6 with 8.1L off, no fluid sent for analysis.  - paracentesis today with 6.9 L removed. Wbc/culture pending.  - patient and daughter refused antibiotics at this time and would prefer to wait for result of paracentesis.

## 2018-09-11 NOTE — PLAN OF CARE
Problem: Patient Care Overview  Goal: Plan of Care Review  Outcome: Ongoing (interventions implemented as appropriate)  Recommendation/Intervention:   1. Encourage good po intake   2. Continue diet order   3.Dietitian will continue to monitor     Goals:   Patient to meet >85%EEN/EPN    Nutrition Goal Status: new  Communication of RD Recs: (POC)    Nutrition Discharge Planningm Na education provided    NFPE Completed 18  Malnutrition in the context of Chronic Illness/Injury     Related to (etiology):  Liver fx, liver transplant work-up     Signs and Symptoms (as evidenced by):  Energy Intake: <50% of estimated energy requirement for 6 months  Body Fat Depletion: severe depletion of orbitals and triceps   Muscle Mass Depletion: severe depletion of temples, clavicle region and lower extremities   Weight Loss: 5% x 6 months     Nutrition Diagnosis Status:  New

## 2018-09-11 NOTE — PROGRESS NOTES
Pt arrived to [facility]PU room 1 for para, no acute distress noted. Orders and labs reviewed on chart. Awaiting consent.  at bedside.

## 2018-09-11 NOTE — H&P
[facility]  Liver Transplant  History & Physical    Patient Name: Scarlett Reddy  MRN: [id number]  Admission Date: 9/10/2018  Code Status: Full Code  Primary Care Provider: Primary Doctor No    Subjective:     History of Present Illness:  Scarlett Reddy is a 68yr old female with PMH of decompensated cirrhosis due to Hep B c/b ascites requiring weekly paracentesis, hepatic encephalopathy, esophageal varices, and hyponatremia. She is listed for liver transplant with MELD 23. She presented to outpatient clinic today with c/o not feeling well, c/o diffuse abdominal pain that started 2 days ago. Pain is 10/10. She reports pain is similar to previous episodes when she is due for a paracentesis. She reports decreased oral intake due to pain and abdominal fullness. C/o chronic nausea, no vomiting. Last paracentesis was 9/6 and she had 8.1L removed, no fluid analysis on file. She reports multiple BM's daily with lactulose. Na decreased on outpatient labs but likely related to hyperglycemia. Diuretics have remained on hold for hyponatremia and therefore, patient has been requiring paracentesis every 5 days. In addition, patient has been non-compliant with low Na and fluid restricted diet. She is being admitted for abdominal pain and hyponatremia.       Past Medical History:   Diagnosis Date    Anemia requiring transfusions 8/16/2018    Ascites     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 8/15/2018    Cirrhosis     Cough 8/30/2018    Esophageal varices     Esophageal varices without bleeding 8/15/2018    Hepatic encephalopathy     Hepatitis B     Hepatitis D virus infection     Hypersplenism     Hyponatremia     Hypotension     Liver transplant candidate     Neutropenia     Portal hypertension     Severe malnutrition 8/16/2018    Type 2 diabetes mellitus, with long-term current use of insulin 8/15/2018       Past Surgical History:   Procedure Laterality Date    EGD  (ESOPHAGOGASTRODUODENOSCOPY) N/A 8/17/2018    Performed by Travon Delgadillo MD at [facility] (2ND FLR)    ESOPHAGOGASTRODUODENOSCOPY N/A 8/17/2018    Procedure: EGD (ESOPHAGOGASTRODUODENOSCOPY);  Surgeon: Travon Delgadillo MD;  Location: [facility] (2ND FLR);  Service: Endoscopy;  Laterality: N/A;       Review of patient's allergies indicates:  No Known Allergies    Family History     None        Tobacco Use    Smoking status: Never Smoker    Smokeless tobacco: Never Used   Substance and Sexual Activity    Alcohol use: No     Frequency: Never    Drug use: No    Sexual activity: Not on file       PTA Medications   Medication Sig    entecavir (BARACLUDE) 0.5 MG Tab Take 1 tablet (0.5 mg total) by mouth once daily.    insulin aspart U-100 (NOVOLOG) 100 unit/mL injection Inject 12 Units into the skin 3 (three) times daily before meals. Plus medium dose sliding scale.    insulin glargine (LANTUS) 100 unit/mL injection Inject 18 Units into the skin every evening.    lactulose (CHRONULAC) 10 gram/15 mL solution Take 15 mLs (10 g total) by mouth 2 (two) times daily. Adjust dose to have 4 bowel movements daily    midodrine (PROAMATINE) 5 MG Tab Take 3 tablets (15 mg total) by mouth every 8 (eight) hours.    rifAXIMin (XIFAXAN) 550 mg Tab Take 1 tablet (550 mg total) by mouth 2 (two) times daily.    sodium bicarbonate 650 MG tablet Take 2 tablets (1,300 mg total) by mouth 2 (two) times daily.    ursodiol (ACTIGALL) 300 mg capsule Take 1 capsule (300 mg total) by mouth 3 (three) times daily.    blood sugar diagnostic (ACCU-CHEK DC) Strp 1 strip by Misc.(Non-Drug; Combo Route) route 3 (three) times daily.    lidocaine-prilocaine (EMLA) cream Apply topically as needed.    megestrol (MEGACE) 20 MG Tab Take 1 tablet (20 mg total) by mouth once daily.       Review of Systems   Constitutional: Positive for appetite change. Negative for chills, fatigue and fever.   Respiratory: Negative for cough, chest tightness and  shortness of breath.    Cardiovascular: Negative for chest pain, palpitations and leg swelling.   Gastrointestinal: Positive for abdominal distention, abdominal pain and nausea. Negative for constipation, diarrhea and vomiting.   Genitourinary: Negative for difficulty urinating, dysuria, frequency and urgency.   Musculoskeletal: Negative for back pain and neck pain.   Skin: Negative for color change, pallor, rash and wound.   Neurological: Positive for weakness. Negative for dizziness and headaches.   Psychiatric/Behavioral: Negative for confusion and sleep disturbance.     Objective:     Vital Signs (Most Recent):  Pulse: 92 (09/10/18 1602)  Resp: 18 (09/10/18 1602)  BP: 122/66 (09/10/18 1602)  SpO2: 96 % (09/10/18 1602) Vital Signs (24h Range):  Temp:  [97.8 °F (36.6 °C)] 97.8 °F (36.6 °C)  Pulse:  [] 92  Resp:  [18] 18  SpO2:  [96 %-97 %] 96 %  BP: (116-122)/(66-74) 122/66        There is no height or weight on file to calculate BMI.    No intake or output data in the 24 hours ending 09/10/18 1853    Physical Exam   Constitutional: She is oriented to person, place, and time. She appears well-developed. No distress.   Temporal and distal extremity muscle wasting   HENT:   Head: Normocephalic and atraumatic.   Eyes: Scleral icterus is present.   Cardiovascular: Normal rate, regular rhythm and normal heart sounds.   Pulmonary/Chest: Effort normal. No respiratory distress. She has decreased breath sounds in the right lower field and the left lower field. She has no rales.   Abdominal: Bowel sounds are normal. She exhibits distension and ascites. There is generalized tenderness.   Musculoskeletal: Normal range of motion. She exhibits edema (trace).   Neurological: She is alert and oriented to person, place, and time.   Skin: Skin is warm and dry. She is not diaphoretic.   Psychiatric: She has a normal mood and affect. Her behavior is normal. Judgment and thought content normal.   Nursing note and vitals  reviewed.      Laboratory:  CBC:   Recent Labs   Lab  09/10/18   1655   WBC  6.10   RBC  3.32*   HGB  10.4*   HCT  31.0*   PLT  101*   MCV  93   MCH  31.3*   MCHC  33.5     CMP:   Recent Labs   Lab  09/10/18   1655   GLU  159*   CALCIUM  9.7   ALBUMIN  3.3*   PROT  6.1   NA  128*   K  4.2   CO2  23   CL  93*   BUN  62*   CREATININE  1.2   ALKPHOS  284*   ALT  41   AST  63*   BILITOT  2.5*     Coagulation:   Recent Labs   Lab  09/10/18   1655   INR  1.1     Labs within the past 24 hours have been reviewed.    Diagnostic Results:  I have personally reviewed all pertinent imaging studies.    Assessment/Plan:     * Ascites    - chronic, requires para every 5 days since diuretics on hold for hyponatremia  - last para 9/6 with 8.1L off, no fluid sent for analysis  - schedule for para tomorrow with cell counts/culture  - patient and daughter refused antibiotics at this time and would prefer to wait for result of paracentesis        Hyponatremia    - Na 126 on outpatient labs likely related to non-compliance and hyperglycemia  - repeat Na 128.  - continue fluid restriction, holding diuretics, albumin 25% x 2 doses. Continue to monitor.        Portal vein thrombosis    - previously on lovenox but stopped 8/21/18 due to thrombocytopenia  - Plan to repeat abd US in 1 month (end of September) to determine if needs anticoagulation        Severe malnutrition    - dietary consulted due to non-compliance with diet  - continue megace, boost supplements. Monitor.         Esophageal varices without bleeding    - last EGD 8/17/18, small non-bleeding varices found.  - h/h stable. No s/s of active bleeding.        Type 2 diabetes mellitus with diabetic polyneuropathy, with long-term current use of insulin    - hyperglycemic. Endocrine consulted.         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    - Listed for liver transplant with MELD 23    MELD-Na score: 21 at 9/10/2018  4:55 PM  MELD score: 13 at 9/10/2018  4:55  PM  Calculated from:  Serum Creatinine: 1.2 mg/dL at 9/10/2018  4:55 PM  Serum Sodium: 128 mmol/L at 9/10/2018  4:55 PM  Total Bilirubin: 2.5 mg/dL at 9/10/2018  4:55 PM  INR(ratio): 1.1 at 9/10/2018  4:55 PM  Age: 68 years        Hepatic encephalopathy    - chronic  - continue rifaximin and lactulose. Monitor.            Rossy Shaikh, TERRY  Liver Transplant  [facility]

## 2018-09-11 NOTE — H&P
Inpatient Radiology Pre-procedure Note    History of Present Illness:  Scarlett Reddy is a 68 y.o. female who presents for ultrasound guided paracentesis.  Admission H&P reviewed.  Past Medical History:   Diagnosis Date    Anemia requiring transfusions 8/16/2018    Ascites     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 8/15/2018    Cirrhosis     Cough 8/30/2018    Esophageal varices     Esophageal varices without bleeding 8/15/2018    Hepatic encephalopathy     Hepatitis B     Hepatitis D virus infection     Hypersplenism     Hyponatremia     Hypotension     Liver transplant candidate     Neutropenia     Portal hypertension     Severe malnutrition 8/16/2018    Type 2 diabetes mellitus, with long-term current use of insulin 8/15/2018     Past Surgical History:   Procedure Laterality Date    EGD (ESOPHAGOGASTRODUODENOSCOPY) N/A 8/17/2018    Performed by Travon Delgadillo MD at [facility] ([address] [place] [address])    ESOPHAGOGASTRODUODENOSCOPY N/A 8/17/2018    Procedure: EGD (ESOPHAGOGASTRODUODENOSCOPY);  Surgeon: Travon Delgadillo MD;  Location: [facility] ([address] [place] [address]);  Service: Endoscopy;  Laterality: N/A;       Review of Systems:   As documented in primary team H&P    Home Meds:   Prior to Admission medications    Medication Sig Start Date End Date Taking? Authorizing Provider   entecavir (BARACLUDE) 0.5 MG Tab Take 1 tablet (0.5 mg total) by mouth once daily. 9/4/18  Yes Rita Rubin NP   insulin aspart U-100 (NOVOLOG) 100 unit/mL injection Inject 12 Units into the skin 3 (three) times daily before meals. Plus medium dose sliding scale. 9/4/18  Yes Rita Rubin NP   insulin glargine (LANTUS) 100 unit/mL injection Inject 18 Units into the skin every evening. 8/31/18  Yes Cristobal Marmolejo Jr., MD   lactulose (CHRONULAC) 10 gram/15 mL solution Take 15 mLs (10 g total) by mouth 2 (two) times daily. Adjust dose to have 4 bowel movements daily 9/4/18  Yes Rita uRbin NP   midodrine (PROAMATINE) 5 MG Tab  Take 3 tablets (15 mg total) by mouth every 8 (eight) hours. 8/24/18 2/20/19 Yes Gigi Sprague MD   rifAXIMin (XIFAXAN) 550 mg Tab Take 1 tablet (550 mg total) by mouth 2 (two) times daily. 8/27/18  Yes Rita Rubin NP   sodium bicarbonate 650 MG tablet Take 2 tablets (1,300 mg total) by mouth 2 (two) times daily. 8/24/18 8/24/19 Yes Gigi Sprague MD   ursodiol (ACTIGALL) 300 mg capsule Take 1 capsule (300 mg total) by mouth 3 (three) times daily. 9/4/18  Yes Rita Rubin NP   blood sugar diagnostic (ACCU-CHEK DC) Strp 1 strip by Misc.(Non-Drug; Combo Route) route 3 (three) times daily.    Historical Provider, MD   lidocaine-prilocaine (EMLA) cream Apply topically as needed. 8/31/18   Cristobal Marmolejo Jr., MD   megestrol (MEGACE) 20 MG Tab Take 1 tablet (20 mg total) by mouth once daily. 9/10/18 9/10/19  Rita Rubin NP     Scheduled Meds:    entecavir  0.5 mg Oral Daily    heparin (porcine)  5,000 Units Subcutaneous Q8H    insulin aspart U-100  9 Units Subcutaneous TIDWM    insulin detemir U-100  13 Units Subcutaneous QHS    lactulose  10 g Oral BID    megestrol  20 mg Oral Daily    midodrine  15 mg Oral Q8H    rifAXIMin  550 mg Oral BID    sodium bicarbonate  1,300 mg Oral BID    ursodiol  300 mg Oral TID     Continuous Infusions:   PRN Meds:acetaminophen, dextrose 50%, dextrose 50%, glucagon (human recombinant), glucose, glucose, insulin aspart U-100, ondansetron, sodium chloride 0.9%  Anticoagulants/Antiplatelets: Heparin    Allergies: Review of patient's allergies indicates:  No Known Allergies  Sedation Hx: have not been any systemic reactions    Labs:  Recent Labs   Lab  09/11/18 0427   INR  1.1       Recent Labs   Lab  09/11/18 0427   WBC  3.81*   HGB  8.0*   HCT  24.4*   MCV  95   PLT  67*      Recent Labs   Lab  09/11/18 0427   GLU  108   NA  128*   K  3.7   CL  94*   CO2  21*   BUN  58*   CREATININE  1.1   CALCIUM  9.7   MG  2.1   ALT  28   AST  51*   ALBUMIN  3.7    BILITOT  2.5*         Vitals:  Temp: 98.5 °F (36.9 °C) (09/11/18 0801)  Pulse: 92 (09/11/18 0801)  Resp: 15 (09/11/18 0801)  BP: (!) 106/53 (09/11/18 0801)  SpO2: 96 % (09/11/18 0801)     Physical Exam:  ASA: 3  Mallampati: n/a    General: no acute distress  Mental Status: alert and oriented to person, place and time  HEENT: normocephalic, atraumatic  Chest: unlabored breathing  Heart: regular heart rate  Abdomen: distended  Extremity: moves all extremities    Plan: ultrasound guided paracentesis  Sedation Plan: local    ASHOK Boston, VICKYP  Interventional Radiology  [phone] spectralink

## 2018-09-12 ENCOUNTER — TELEPHONE (OUTPATIENT)
Dept: ENDOCRINOLOGY | Facility: HOSPITAL | Age: 68
End: 2018-09-12

## 2018-09-12 VITALS
RESPIRATION RATE: 17 BRPM | BODY MASS INDEX: 24.38 KG/M2 | HEIGHT: 63 IN | WEIGHT: 137.56 LBS | TEMPERATURE: 98 F | SYSTOLIC BLOOD PRESSURE: 96 MMHG | DIASTOLIC BLOOD PRESSURE: 50 MMHG | HEART RATE: 93 BPM | OXYGEN SATURATION: 96 %

## 2018-09-12 DIAGNOSIS — R18.8 ASCITES OF LIVER: Primary | ICD-10-CM

## 2018-09-12 DIAGNOSIS — Z79.4 TYPE 2 DIABETES MELLITUS WITH DIABETIC POLYNEUROPATHY, WITH LONG-TERM CURRENT USE OF INSULIN: Primary | ICD-10-CM

## 2018-09-12 DIAGNOSIS — E11.42 TYPE 2 DIABETES MELLITUS WITH DIABETIC POLYNEUROPATHY, WITH LONG-TERM CURRENT USE OF INSULIN: Primary | ICD-10-CM

## 2018-09-12 PROBLEM — R07.9 CHEST PAIN: Status: RESOLVED | Noted: 2018-09-11 | Resolved: 2018-09-12

## 2018-09-12 LAB
ALBUMIN SERPL BCP-MCNC: 3.8 G/DL
ALP SERPL-CCNC: 170 U/L
ALT SERPL W/O P-5'-P-CCNC: 24 U/L
ANION GAP SERPL CALC-SCNC: 11 MMOL/L
AST SERPL-CCNC: 45 U/L
BASOPHILS # BLD AUTO: 0.02 K/UL
BASOPHILS NFR BLD: 0.4 %
BILIRUB SERPL-MCNC: 2.8 MG/DL
BUN SERPL-MCNC: 50 MG/DL
CALCIUM SERPL-MCNC: 9.4 MG/DL
CHLORIDE SERPL-SCNC: 95 MMOL/L
CO2 SERPL-SCNC: 24 MMOL/L
CREAT SERPL-MCNC: 1 MG/DL
DIFFERENTIAL METHOD: ABNORMAL
EOSINOPHIL # BLD AUTO: 0.1 K/UL
EOSINOPHIL NFR BLD: 2 %
ERYTHROCYTE [DISTWIDTH] IN BLOOD BY AUTOMATED COUNT: 17.4 %
EST. GFR  (AFRICAN AMERICAN): >60 ML/MIN/1.73 M^2
EST. GFR  (NON AFRICAN AMERICAN): 57.6 ML/MIN/1.73 M^2
GLUCOSE SERPL-MCNC: 93 MG/DL
HCT VFR BLD AUTO: 26.8 %
HGB BLD-MCNC: 9 G/DL
IMM GRANULOCYTES # BLD AUTO: 0.04 K/UL
IMM GRANULOCYTES NFR BLD AUTO: 0.8 %
INR PPP: 1.2
LYMPHOCYTES # BLD AUTO: 0.5 K/UL
LYMPHOCYTES NFR BLD: 10.4 %
MAGNESIUM SERPL-MCNC: 2 MG/DL
MCH RBC QN AUTO: 31.6 PG
MCHC RBC AUTO-ENTMCNC: 33.6 G/DL
MCV RBC AUTO: 94 FL
MONOCYTES # BLD AUTO: 0.7 K/UL
MONOCYTES NFR BLD: 13.4 %
NEUTROPHILS # BLD AUTO: 3.7 K/UL
NEUTROPHILS NFR BLD: 73 %
NRBC BLD-RTO: 0 /100 WBC
PLATELET # BLD AUTO: 68 K/UL
PMV BLD AUTO: 10.8 FL
POCT GLUCOSE: 107 MG/DL (ref 70–110)
POCT GLUCOSE: 213 MG/DL (ref 70–110)
POCT GLUCOSE: 410 MG/DL (ref 70–110)
POTASSIUM SERPL-SCNC: 3.8 MMOL/L
PROT SERPL-MCNC: 5.5 G/DL
PROTHROMBIN TIME: 11.9 SEC
RBC # BLD AUTO: 2.85 M/UL
SODIUM SERPL-SCNC: 130 MMOL/L
WBC # BLD AUTO: 5.08 K/UL

## 2018-09-12 PROCEDURE — 97161 PT EVAL LOW COMPLEX 20 MIN: CPT | Mod: NTX

## 2018-09-12 PROCEDURE — 80053 COMPREHEN METABOLIC PANEL: CPT | Mod: NTX

## 2018-09-12 PROCEDURE — 25000003 PHARM REV CODE 250: Mod: NTX | Performed by: NURSE PRACTITIONER

## 2018-09-12 PROCEDURE — 85025 COMPLETE CBC W/AUTO DIFF WBC: CPT | Mod: NTX

## 2018-09-12 PROCEDURE — 36415 COLL VENOUS BLD VENIPUNCTURE: CPT | Mod: NTX

## 2018-09-12 PROCEDURE — 99239 HOSP IP/OBS DSCHRG MGMT >30: CPT | Mod: NTX,,, | Performed by: NURSE PRACTITIONER

## 2018-09-12 PROCEDURE — 83735 ASSAY OF MAGNESIUM: CPT | Mod: NTX

## 2018-09-12 PROCEDURE — 99232 SBSQ HOSP IP/OBS MODERATE 35: CPT | Mod: NTX,,, | Performed by: NURSE PRACTITIONER

## 2018-09-12 PROCEDURE — 85610 PROTHROMBIN TIME: CPT | Mod: NTX

## 2018-09-12 RX ORDER — INSULIN GLARGINE 100 [IU]/ML
20 INJECTION, SOLUTION SUBCUTANEOUS NIGHTLY
Qty: 15 ML | Refills: 3
Start: 2018-09-12 | End: 2018-09-12 | Stop reason: SDUPTHER

## 2018-09-12 RX ORDER — INSULIN GLARGINE 100 [IU]/ML
12 INJECTION, SOLUTION SUBCUTANEOUS NIGHTLY
Qty: 15 ML | Refills: 3 | Status: ON HOLD
Start: 2018-09-12 | End: 2018-09-21 | Stop reason: SDUPTHER

## 2018-09-12 RX ORDER — LACTULOSE 10 G/15ML
15 SOLUTION ORAL 3 TIMES DAILY
Status: DISCONTINUED | OUTPATIENT
Start: 2018-09-12 | End: 2018-09-12 | Stop reason: HOSPADM

## 2018-09-12 RX ORDER — CHOLESTYRAMINE 4 G/9G
1 POWDER, FOR SUSPENSION ORAL 2 TIMES DAILY
Status: DISCONTINUED | OUTPATIENT
Start: 2018-09-12 | End: 2018-09-12 | Stop reason: HOSPADM

## 2018-09-12 RX ORDER — LACTULOSE 10 G/15ML
20 SOLUTION ORAL; RECTAL 3 TIMES DAILY
Qty: 4000 ML | Refills: 3 | Status: SHIPPED | OUTPATIENT
Start: 2018-09-12 | End: 2018-10-01

## 2018-09-12 RX ORDER — CHOLESTYRAMINE 4 G/9G
1 POWDER, FOR SUSPENSION ORAL 2 TIMES DAILY
Qty: 180 PACKET | Refills: 3 | Status: ON HOLD | OUTPATIENT
Start: 2018-09-12 | End: 2018-09-21 | Stop reason: HOSPADM

## 2018-09-12 RX ADMIN — ENTECAVIR 0.5 MG: 0.5 TABLET, FILM COATED ORAL at 08:09

## 2018-09-12 RX ADMIN — INSULIN ASPART 9 UNITS: 100 INJECTION, SOLUTION INTRAVENOUS; SUBCUTANEOUS at 08:09

## 2018-09-12 RX ADMIN — MIDODRINE HYDROCHLORIDE 15 MG: 5 TABLET ORAL at 04:09

## 2018-09-12 RX ADMIN — MIDODRINE HYDROCHLORIDE 15 MG: 5 TABLET ORAL at 03:09

## 2018-09-12 RX ADMIN — SODIUM BICARBONATE 650 MG TABLET 1300 MG: at 08:09

## 2018-09-12 RX ADMIN — LACTULOSE 15 G: 20 SOLUTION ORAL at 03:09

## 2018-09-12 RX ADMIN — INSULIN ASPART 9 UNITS: 100 INJECTION, SOLUTION INTRAVENOUS; SUBCUTANEOUS at 12:09

## 2018-09-12 RX ADMIN — URSODIOL 300 MG: 300 CAPSULE ORAL at 08:09

## 2018-09-12 RX ADMIN — INSULIN ASPART 5 UNITS: 100 INJECTION, SOLUTION INTRAVENOUS; SUBCUTANEOUS at 12:09

## 2018-09-12 RX ADMIN — CHOLESTYRAMINE 4 G: 4 POWDER, FOR SUSPENSION ORAL at 12:09

## 2018-09-12 RX ADMIN — RIFAXIMIN 550 MG: 550 TABLET ORAL at 08:09

## 2018-09-12 RX ADMIN — LACTULOSE 10 G: 20 SOLUTION ORAL at 08:09

## 2018-09-12 NOTE — PROGRESS NOTES
Discharge Note:    TIMBO met with pt, pts dgtr patricia and pts  at bedside with Language Line Inter# [id number] on the speaker phone today in pts room.  Pt lying in bed, A&O x3, allowing her dgtr to run the conversation with TIMBO and other team members discussing dc planning needs.  Pt and pts family in agreement with dc plans for today.  Pt will dc back to local lodging at [facility] with dgtr Patricia ([phone]) and  Andrés Ramirez ([phone]).  Pts dgtr returning home to [facility] on 9/22 and they are trying to arrange for a paid caregiver to assist for a few hours a day.  TIMBO provided a list of local home care agencies, but instructed pts dgtr they will need to call with an  to make those arrangements.   TIMBO will speak with SW supervisor NED Putnam and Director of [employer] to ensure that patient's dgtr receives assistance in contact these agencies.   Pt denies coping issues or mental health concerns.  Family denies coping issues.   TIMBO remains available for all transplant resources, education and psychosocial support.

## 2018-09-12 NOTE — PLAN OF CARE
Problem: Patient Care Overview  Goal: Plan of Care Review  Pt AAOx4, bed low locked, call light within reach, wearing nonskid socks. Pt instructed to use call light for assistance, pt verbalizes understanding.  Pt denies any pain or discomfort at this time. AC/HS, 126, no coverage needed. Pt remains free from injury/harm at this time. Afebrile. See flowsheet for full assessment/VS.

## 2018-09-12 NOTE — PT/OT/SLP EVAL
Physical Therapy Evaluation and Discharge Note    Patient Name:  Scarlett Reddy   MRN:  [id number]    Recommendations:     Discharge Recommendations:  home(return to [facility])   Discharge Equipment Recommendations: none   Barriers to discharge: None    Assessment:     Scarlett Reddy is a 68 y.o. female admitted with a medical diagnosis of Ascites. Pt able to perform functional mobility without physical assist or use of DME. Ambulated greater than household distance with no SOB or LOB noted.  At this time, patient is functioning at their prior level of function and does not require further acute PT services.     Recent Surgery: * No surgery found *      Plan:     During this hospitalization, patient does not require further acute PT services.  Please re-consult if situation changes.      Subjective     Chief Complaint: none noted  Patient/Family Comments/goals: return home   Pain/Comfort:  · Pain Rating 1: 0/10    Patients cultural, spiritual, Religion conflicts given the current situation: none noted     Living Environment:  Pt is international, currently staying locally at [facility] with her  and daughter. Pt was ambulating with rollator PTA and was performing ADLs with assist prn.   Prior to admission, patients level of function was mod-I with assist prn.  Equipment used at home: rollator.  Upon discharge, patient will have assistance from family.    Objective:     Communicated with RN prior to session.  Patient found performing self-care tasks in bathroom upon PT entry to room found with: (no lines connected during session )     General Precautions: Standard, fall   Orthopedic Precautions:N/A   Braces: N/A     Exams:  · Communication: speaks limited English; pt's daughter serving as  during session  · RLE ROM: WFL  · RLE Strength: WFL  · LLE ROM: WFL  · LLE Strength: WFL    Functional Mobility:  · Transfers:     · Sit to Stand:  supervision with no AD  · Gait: ~200 ft. with SBA and no  AD  · Decreased giuseppe, impaired weight-shifting ability     AM-PAC 6 CLICK MOBILITY  Total Score:22       Therapeutic Activities and Exercises:   Pt educated on role of PT and PT POC. Pt verbalized understanding.   NP notified of no d/c needs.     AM-PAC 6 CLICK MOBILITY  Total Score:22     Patient left seated EOB with all lines intact, call button in reach, NP notified and pt's  and daughter present.    GOALS:   Multidisciplinary Problems     Physical Therapy Goals     Not on file          Multidisciplinary Problems (Resolved)        Problem: Physical Therapy Goal    Goal Priority Disciplines Outcome Goal Variances Interventions   Physical Therapy Goal   (Resolved)     PT, PT/OT Outcome(s) achieved                     History:     Past Medical History:   Diagnosis Date    Anemia requiring transfusions 8/16/2018    Ascites     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 8/15/2018    Cirrhosis     Cough 8/30/2018    Esophageal varices     Esophageal varices without bleeding 8/15/2018    Hepatic encephalopathy     Hepatitis B     Hepatitis D virus infection     Hypersplenism     Hyponatremia     Hypotension     Liver transplant candidate     Neutropenia     Portal hypertension     Severe malnutrition 8/16/2018    Type 2 diabetes mellitus, with long-term current use of insulin 8/15/2018       Past Surgical History:   Procedure Laterality Date    EGD (ESOPHAGOGASTRODUODENOSCOPY) N/A 8/17/2018    Performed by Travon Delgadillo MD at [facility] ([address] [place] [address])    ESOPHAGOGASTRODUODENOSCOPY N/A 8/17/2018    Procedure: EGD (ESOPHAGOGASTRODUODENOSCOPY);  Surgeon: Travon Delgadillo MD;  Location: [address]);  Service: Endoscopy;  Laterality: N/A;       Clinical Decision Making:     History  Co-morbidities and personal factors that may impact the plan of care Examination  Body Structures and Functions, activity limitations and participation restrictions that may impact the plan of care Clinical  Presentation   Decision Making/ Complexity Score   Co-morbidities:   [x] Time since onset of injury / illness / exacerbation  [] Status of current condition  []Patient's cognitive status and safety concerns    [x] Multiple Medical Problems (see med hx)  Personal Factors:   [x] Patient's age  [] Prior Level of function   [] Patient's home situation (environment and family support)  [] Patient's level of motivation  [] Expected progression of patient      HISTORY:(criteria)    [] 12072 - no personal factors/history    [] 72783 - has 1-2 personal factor/comorbidity     [x] 82283 - has >3 personal factor/comorbidity     Body Regions:  [] Objective examination findings  [] Head     []  Neck  [] Trunk   [] Upper Extremity  [] Lower Extremity    Body Systems:  [] For communication ability, affect, cognition, language, and learning style: the assessment of the ability to make needs known, consciousness, orientation (person, place, and time), expected emotional /behavioral responses, and learning preferences (eg, learning barriers, education  needs)  [x] For the neuromuscular system: a general assessment of gross coordinated movement (eg, balance, gait, locomotion, transfers, and transitions) and motor function  (motor control and motor learning)  [x] For the musculoskeletal system: the assessment of gross symmetry, gross range of motion, gross strength, height, and weight  [] For the integumentary system: the assessment of pliability(texture), presence of scar formation, skin color, and skin integrity  [] For cardiovascular/pulmonary system: the assessment of heart rate, respiratory rate, blood pressure, and edema     Activity limitations:    [] Patient's cognitive status and saf ety concerns          [] Status of current condition      [] Weight bearing restriction  [] Cardiopulmunary Restriction    Participation Restrictions:   [] Goals and goal agreement with the patient     [] Rehab potential (prognosis) and probable  outcome      Examination of Body System: (criteria)    [x] 65506 - addressing 1-2 elements    [] 77960 - addressing a total of 3 or more elements     [] 62333 -  Addressing a total of 4 or more elements         Clinical Presentation: (criteria)  Stable - 13380     On examination of body system using standardized tests and measures patient presents with 1-2 elements from any of the following: body structures and functions, activity limitations, and/or participation restrictions.  Leading to a clinical presentation that is considered stable and/or uncomplicated                              Clinical Decision Making  (Eval Complexity):  Low- 66176     Time Tracking:     PT Received On: 09/12/18  PT Start Time: 1453     PT Stop Time: 1500  PT Total Time (min): 7 min     Billable Minutes: Evaluation 7    Radha Nguyen, PT, DPT   9/12/2018  [phone]

## 2018-09-12 NOTE — PROGRESS NOTES
Met with patient,  and daughter in preparation for discharge today.  Used language line for Uzbek translating service (  ID # [id number]). Reviewed outpatient appointments.  Gave them [facility]  Booklet.  Daughter reports we reviewed it with her a few weeks ago and she will reread it in English.  Allowed time for questions and answers.

## 2018-09-12 NOTE — SUBJECTIVE & OBJECTIVE
"Interval HPI:   Overnight events: Remains on TSU.  BG elevated yesterday afternoon but well controlled overnight.  Eatin-100%  Nausea: No  Hypoglycemia and intervention: No  Fever: No  TPN and/or TF: No    BP (!) 94/50 (BP Location: Left arm, Patient Position: Lying)   Pulse 90   Temp 98.4 °F (36.9 °C) (Oral)   Resp 12   Ht 5' 3" (1.6 m)   Wt 62.4 kg (137 lb 9.1 oz)   SpO2 96%   Breastfeeding? No   BMI 24.37 kg/m²     Labs Reviewed and Include    Recent Labs   Lab  18   0550   GLU  93   CALCIUM  9.4   ALBUMIN  3.8   PROT  5.5*   NA  130*   K  3.8   CO2  24   CL  95   BUN  50*   CREATININE  1.0   ALKPHOS  170*   ALT  24   AST  45*   BILITOT  2.8*     Lab Results   Component Value Date    WBC 5.08 2018    HGB 9.0 (L) 2018    HCT 26.8 (L) 2018    MCV 94 2018    PLT 68 (L) 2018     No results for input(s): TSH, FREET4 in the last 168 hours.  Lab Results   Component Value Date    HGBA1C 6.8 (H) 2018       Nutritional status:   Body mass index is 24.37 kg/m².  Lab Results   Component Value Date    ALBUMIN 3.8 2018    ALBUMIN 3.7 2018    ALBUMIN 3.3 (L) 09/10/2018     No results found for: PREALBUMIN    Estimated Creatinine Clearance: 43.9 mL/min (based on SCr of 1 mg/dL).    Accu-Checks  Recent Labs      09/10/18   1751  09/10/18   1932  09/10/18   2331  18   0804  18   1201  18   1820  18   2014  18   0753   POCTGLUCOSE  153*  194*  138*  123*  213*  330*  126*  107       Current Medications and/or Treatments Impacting Glycemic Control  Immunotherapy:    Immunosuppressants     None        Steroids:   Hormones (From admission, onward)    None        Pressors:    Autonomic Drugs (From admission, onward)    Start     Stop Route Frequency Ordered    09/10/18 2200  midodrine tablet 15 mg      -- Oral Every 8 hours 09/10/18 1647        Hyperglycemia/Diabetes Medications:   Antihyperglycemics (From admission, onward)    Start     " Stop Route Frequency Ordered    09/10/18 2100  insulin detemir U-100 pen 13 Units      -- SubQ Nightly 09/10/18 1649    09/10/18 1930  insulin aspart U-100 pen 9 Units      -- SubQ 3 times daily with meals 09/10/18 1920    09/10/18 1745  insulin aspart U-100 pen 0-5 Units      -- SubQ Before meals & nightly PRN 09/10/18 1646

## 2018-09-12 NOTE — ASSESSMENT & PLAN NOTE
Current BG goal 140-180    Continue Levemir 13 units Q hs  Prandial Novolog 9 units with meals  Low dose correction scale insulin    Discussed plan with patient and family using  ID# [id number]    Discharge planning: Lantus 12 units SQ q hs, Novolog 12 units SQ with meals, and low correction scale Novolog starting at a BG of 180.  BG logs provided to patient's family at bedside.  Patient to follow up in endocrine clinic.

## 2018-09-12 NOTE — PLAN OF CARE
Problem: Physical Therapy Goal  Goal: Physical Therapy Goal  Outcome: Outcome(s) achieved Date Met: 09/12/18  PT evaluation complete. No goals established as pt is baseline with mobility and has no acute PT needs at this time. D/C from PT services.    Radha Nguyen, PT, DPT   9/12/2018  [phone]

## 2018-09-12 NOTE — DISCHARGE SUMMARY
[facility]  Liver Transplant  Discharge Summary      Patient Name: Scarlett Reddy  MRN: [id number]  Admission Date: 9/10/2018  Hospital Length of Stay: 2 days  Discharge Date and Time:  09/12/2018 2:26 PM  Attending Physician: Yony Burns MD   Discharging Provider: Kimber Valle NP  Primary Care Provider: Primary Doctor No       HPI: Scarlett Reddy is a 68 yr old female with PMH of decompensated cirrhosis due to Hep B c/b ascites requiring weekly paracentesis, hepatic encephalopathy, esophageal varices, and hyponatremia. She is listed for liver transplant with MELD 23. She presented to outpatient clinic  9/10 with c/o not feeling well, c/o diffuse abdominal pain that started 2 days prior to admission. Pain is 10/10. She reports pain is similar to previous episodes when she is due for a paracentesis. She reports decreased oral intake due to pain and abdominal fullness. C/o chronic nausea, no vomiting. Last paracentesis was 9/6 and she had 8.1L removed, no fluid analysis on file. She reports multiple BM's daily with lactulose. Na decreased on outpatient labs but likely related to hyperglycemia. Diuretics have remained on hold for hyponatremia and therefore, patient has been requiring paracentesis every 5 days. In addition, patient has been non-compliant with low Na and fluid restricted diet. She is being admitted for abdominal pain and hyponatremia.      Hospital Course: pt treated with Albumin 25% 100 ml x 2 on admit. Na improved to 128 on 9/11. Paracentesis 9/11 with 6.9 L removed. Wbc 138, 57% segs. Abdomen less tense and non distended today. Will arrange twice weekly paracenteses starting Friday 9/14/18. Pt c/o mild chest discomfort post paracentesis. Cardiac enzymes negative. EKG showed ST. Chest pain resolved today. Albumin 5% 250 ml given for hypotension. Pt remains on Midodrine 15 mg tid.     Dietician consulted for low na diet education. Pt educated on importance of following diet and  using salt substitutes. Continue 1 L fluid restriction. Diuretics held. Na 130 on day of discharge.     T bili 2.8. Pt with severe pruritis. Will dc Ursodial and start Cholestyramine.  Endocrine consulted for hyperglycemia. Continue insulin.     Ms. Reddy is stable for discharge today. She will have paracenteses twice weekly through 9/22. Next on 9/14, 9/17, 9/21. Pt may not need twice weekly sukumar once she is more compliant with diet restrictions. She will follow the PDOL protocol. Labs and clinic visit (Dr Worrell) Thursday 9/20/18. Pt/caregiver verbalized understanding to all discharge instructions.         Final Active Diagnoses:    Diagnosis Date Noted POA    PRINCIPAL PROBLEM:  Ascites [R18.8] 08/30/2018 Yes    Portal vein thrombosis [I81] 09/10/2018 Yes    Hyponatremia [E87.1]  Yes    Severe malnutrition [E43] 08/16/2018 Yes    Esophageal varices without bleeding [I85.00] 08/15/2018 Yes    Type 2 diabetes mellitus with diabetic polyneuropathy, with long-term current use of insulin [E11.42, Z79.4] 08/15/2018 Not Applicable    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 [B18.0, K74.60] 08/15/2018 Yes    Hepatic encephalopathy [K72.90] 08/14/2018 Yes      Problems Resolved During this Admission:    Diagnosis Date Noted Date Resolved POA    Chest pain [R07.9] 09/11/2018 09/12/2018 Yes       Consults (From admission, onward)        Status Ordering Provider     Inpatient consult to Endocrinology  Once     Provider:  (Not yet assigned)    Completed SADA DEUTSCH     Inpatient consult to Registered Dietitian/Nutritionist  Once     Provider:  (Not yet assigned)    Completed SADA DEUTSCH     Inpatient consult to Registered Dietitian/Nutritionist  Once     Provider:  (Not yet assigned)    Completed FROY GONZALEZ          Pending Diagnostic Studies:     Procedure Component Value Units Date/Time    IR Paracentesis with Imaging [[id number]] Resulted:  09/11/18 1023    Order Status:  Sent Lab Status:  In  process Updated:  09/11/18 1150        Significant Diagnostic Studies: Labs:   CMP   Recent Labs   Lab  09/10/18   1655  09/11/18   0427  09/12/18   0550   NA  128*  128*  130*   K  4.2  3.7  3.8   CL  93*  94*  95   CO2  23  21*  24   GLU  159*  108  93   BUN  62*  58*  50*   CREATININE  1.2  1.1  1.0   CALCIUM  9.7  9.7  9.4   PROT  6.1  5.8*  5.5*   ALBUMIN  3.3*  3.7  3.8   BILITOT  2.5*  2.5*  2.8*   ALKPHOS  284*  206*  170*   AST  63*  51*  45*   ALT  41  28  24   ANIONGAP  12  13  11   ESTGFRAFRICA  53.3*  59.2*  >60.0   EGFRNONAA  46.2*  51.3*  57.6*    and CBC   Recent Labs   Lab  09/10/18   1655  09/11/18   0427  09/12/18   0550   WBC  6.10  3.81*  5.08   HGB  10.4*  8.0*  9.0*   HCT  31.0*  24.4*  26.8*   PLT  101*  67*  68*       The patients clinical status was discussed at multidisplinary rounds, involving transplant surgery, transplant medicine, pharmacy, nursing, nutrition, and social work    Discharged Condition: good    Disposition: Home or Self Care    Follow Up: as above    Patient Instructions:      Diet Adult Regular     Call MD for:  temperature >100.4     Call MD for:  persistent nausea and vomiting or diarrhea     Call MD for:  severe uncontrolled pain     Call MD for:  redness, tenderness, or signs of infection (pain, swelling, redness, odor or green/yellow discharge around incision site)     Call MD for:  difficulty breathing or increased cough     Call MD for:  severe persistent headache     Call MD for:  worsening rash     Call MD for:  persistent dizziness, light-headedness, or visual disturbances     Call MD for:  increased confusion or weakness     Call MD for:   Order Comments: Any unusual problems or concerns.     Activity as tolerated     Medications:  Reconciled Home Medications:      Medication List      START taking these medications    cholestyramine 4 gram packet  Commonly known as:  QUESTRAN  Take 1 packet (4 g total) by mouth 2 (two) times daily.        CHANGE how you take  these medications    insulin glargine 100 unit/mL injection  Commonly known as:  LANTUS  Inject 20 Units into the skin every evening.  What changed:  how much to take     lactulose 10 gram/15 mL solution  Commonly known as:  CHRONULAC  Take 30 mLs (20 g total) by mouth 3 (three) times daily. Adjust dose to have 4 bowel movements daily  What changed:    · how much to take  · when to take this        CONTINUE taking these medications    ACCU-CHEK DC Strp  Generic drug:  blood sugar diagnostic  1 strip by Misc.(Non-Drug; Combo Route) route 3 (three) times daily.     entecavir 0.5 MG Tab  Commonly known as:  BARACLUDE  Take 1 tablet (0.5 mg total) by mouth once daily.     insulin aspart U-100 100 unit/mL injection  Commonly known as:  NOVOLOG  Inject 12 Units into the skin 3 (three) times daily before meals. Plus medium dose sliding scale.     lidocaine-prilocaine cream  Commonly known as:  EMLA  Apply topically as needed.     megestrol 20 MG Tab  Commonly known as:  MEGACE  Take 1 tablet (20 mg total) by mouth once daily.     midodrine 5 MG Tab  Commonly known as:  PROAMATINE  Take 3 tablets (15 mg total) by mouth every 8 (eight) hours.     sodium bicarbonate 650 MG tablet  Take 2 tablets (1,300 mg total) by mouth 2 (two) times daily.     XIFAXAN 550 mg Tab  Generic drug:  rifAXIMin  Take 1 tablet (550 mg total) by mouth 2 (two) times daily.        STOP taking these medications    ursodiol 300 mg capsule  Commonly known as:  ACTIGALL          Time spent caring for patient (Greater than 1/2 spent in direct face-to-face contact): > 30 minutes    Kimber Valle NP  Liver Transplant  [facility]

## 2018-09-12 NOTE — PROGRESS NOTES
"[facility]  Endocrinology  Progress Note    Admit Date: 9/10/2018     Reason for Consult: Management of T2DM, Hyperglycemia     Diabetes diagnosis year:     Lab Results   Component Value Date    HGBA1C 6.8 (H) 2018       Home Diabetes Medications:  Lantus 18 q HS, Novolog 12 units AC, correction scale Novolog    How often checking glucose at home? >4 x day   BG readings on regimen: 140's am fasting, 180-200's pre lunch and dinner  Hypoglycemia on the regimen?  No - hypo awareness at high 80's  Missed doses on regimen?  No    Diabetes Complications include: neuropathy likely related to diabetes    Complicating diabetes co morbidities:   CIRRHOSIS      HPI:   Patient is a 68 y.o. female with a diagnosis of DM2, decompensated cirrhosis, hepatic encephalopathy, esophageal varices, and hyponatremia. She is listed for liver transplant, admitted for abdominal pain and hyponatremia.  Report personal history of DM, positive family history of DM (mother).  Endocrine consulted for DM management.              Interval HPI:   Overnight events: Remains on TSU.  BG elevated yesterday afternoon but well controlled overnight. Tentative discharge today.  Eatin-100%  Nausea: No  Hypoglycemia and intervention: No  Fever: No  TPN and/or TF: No    BP (!) 94/50 (BP Location: Left arm, Patient Position: Lying)   Pulse 90   Temp 98.4 °F (36.9 °C) (Oral)   Resp 12   Ht 5' 3" (1.6 m)   Wt 62.4 kg (137 lb 9.1 oz)   SpO2 96%   Breastfeeding? No   BMI 24.37 kg/m²      Labs Reviewed and Include    Recent Labs   Lab  18   0550   GLU  93   CALCIUM  9.4   ALBUMIN  3.8   PROT  5.5*   NA  130*   K  3.8   CO2  24   CL  95   BUN  50*   CREATININE  1.0   ALKPHOS  170*   ALT  24   AST  45*   BILITOT  2.8*     Lab Results   Component Value Date    WBC 5.08 2018    HGB 9.0 (L) 2018    HCT 26.8 (L) 2018    MCV 94 2018    PLT 68 (L) 2018     No results for input(s): TSH, FREET4 in " the last 168 hours.  Lab Results   Component Value Date    HGBA1C 6.8 (H) 08/16/2018       Nutritional status:   Body mass index is 24.37 kg/m².  Lab Results   Component Value Date    ALBUMIN 3.8 09/12/2018    ALBUMIN 3.7 09/11/2018    ALBUMIN 3.3 (L) 09/10/2018     No results found for: PREALBUMIN    Estimated Creatinine Clearance: 43.9 mL/min (based on SCr of 1 mg/dL).    Accu-Checks  Recent Labs      09/10/18   1751  09/10/18   1932  09/10/18   2331  09/11/18   0804  09/11/18   1201  09/11/18   1820  09/11/18   2014  09/12/18   0753   POCTGLUCOSE  153*  194*  138*  123*  213*  330*  126*  107       Current Medications and/or Treatments Impacting Glycemic Control  Immunotherapy:    Immunosuppressants     None        Steroids:   Hormones (From admission, onward)    None        Pressors:    Autonomic Drugs (From admission, onward)    Start     Stop Route Frequency Ordered    09/10/18 2200  midodrine tablet 15 mg      -- Oral Every 8 hours 09/10/18 1647        Hyperglycemia/Diabetes Medications:   Antihyperglycemics (From admission, onward)    Start     Stop Route Frequency Ordered    09/10/18 2100  insulin detemir U-100 pen 13 Units      -- SubQ Nightly 09/10/18 1649    09/10/18 1930  insulin aspart U-100 pen 9 Units      -- SubQ 3 times daily with meals 09/10/18 1920    09/10/18 1745  insulin aspart U-100 pen 0-5 Units      -- SubQ Before meals & nightly PRN 09/10/18 1646          ASSESSMENT and PLAN    * Ascites    Paracentesis 9/11/18  Managed per primary team  Avoid hypoglycemia        Type 2 diabetes mellitus with diabetic polyneuropathy, with long-term current use of insulin    Current BG goal 140-180    Continue Levemir 13 units Q hs  Prandial Novolog 9 units with meals  Low dose correction scale insulin    Discussed plan with patient and family using  ID# [id number]    Discharge planning: Lantus 12 units SQ q hs, Novolog 12 units SQ with meals, and low correction scale Novolog starting at a BG of 180.   BG logs provided to patient's family at bedside.  Patient to follow up in endocrine clinic.        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    Managed per primary team  avoid hypoglycemia            Seen in conjunction with KEEGAN Osborn NP  Endocrinology  [facility]

## 2018-09-14 ENCOUNTER — INITIAL CONSULT (OUTPATIENT)
Dept: SURGERY | Facility: CLINIC | Age: 68
End: 2018-09-14
Payer: COMMERCIAL

## 2018-09-14 ENCOUNTER — TELEPHONE (OUTPATIENT)
Dept: TRANSPLANT | Facility: CLINIC | Age: 68
End: 2018-09-14

## 2018-09-14 ENCOUNTER — HOSPITAL ENCOUNTER (OUTPATIENT)
Dept: INTERVENTIONAL RADIOLOGY/VASCULAR | Facility: HOSPITAL | Age: 68
Discharge: HOME | End: 2018-09-14
Attending: INTERNAL MEDICINE
Payer: COMMERCIAL

## 2018-09-14 VITALS
BODY MASS INDEX: 22.06 KG/M2 | TEMPERATURE: 97 F | SYSTOLIC BLOOD PRESSURE: 98 MMHG | HEART RATE: 106 BPM | WEIGHT: 129.19 LBS | HEIGHT: 64 IN | DIASTOLIC BLOOD PRESSURE: 60 MMHG

## 2018-09-14 VITALS
DIASTOLIC BLOOD PRESSURE: 58 MMHG | SYSTOLIC BLOOD PRESSURE: 108 MMHG | RESPIRATION RATE: 16 BRPM | OXYGEN SATURATION: 96 % | HEART RATE: 94 BPM

## 2018-09-14 DIAGNOSIS — K74.60 CHRONIC HEPATITIS B WITH DELTA AGENT WITH CIRRHOSIS: Primary | ICD-10-CM

## 2018-09-14 DIAGNOSIS — R63.4 WEIGHT LOSS, UNINTENTIONAL: ICD-10-CM

## 2018-09-14 DIAGNOSIS — R53.1 WEAKNESS: ICD-10-CM

## 2018-09-14 DIAGNOSIS — Z79.4 TYPE 2 DIABETES MELLITUS WITH DIABETIC POLYNEUROPATHY, WITH LONG-TERM CURRENT USE OF INSULIN: Primary | ICD-10-CM

## 2018-09-14 DIAGNOSIS — R18.8 ASCITES OF LIVER: ICD-10-CM

## 2018-09-14 DIAGNOSIS — E11.42 TYPE 2 DIABETES MELLITUS WITH DIABETIC POLYNEUROPATHY, WITH LONG-TERM CURRENT USE OF INSULIN: Primary | ICD-10-CM

## 2018-09-14 DIAGNOSIS — R54 FRAILTY: ICD-10-CM

## 2018-09-14 DIAGNOSIS — B18.0 CHRONIC HEPATITIS B WITH DELTA AGENT WITH CIRRHOSIS: Primary | ICD-10-CM

## 2018-09-14 DIAGNOSIS — E55.9 VITAMIN D DEFICIENCY DISEASE: ICD-10-CM

## 2018-09-14 LAB
APPEARANCE FLD: NORMAL
BACTERIA SPEC AEROBE CULT: NO GROWTH
BODY FLD TYPE: NORMAL
COLOR FLD: YELLOW
LYMPHOCYTES NFR FLD MANUAL: 51 %
MESOTHL CELL NFR FLD MANUAL: 2 %
MONOS+MACROS NFR FLD MANUAL: 25 %
NEUTROPHILS NFR FLD MANUAL: 22 %
WBC # FLD: 192 /CU MM

## 2018-09-14 PROCEDURE — 49083 ABD PARACENTESIS W/IMAGING: CPT | Mod: TXP,,, | Performed by: RADIOLOGY

## 2018-09-14 PROCEDURE — 87076 CULTURE ANAEROBE IDENT EACH: CPT | Mod: NTX

## 2018-09-14 PROCEDURE — 99204 OFFICE O/P NEW MOD 45 MIN: CPT | Mod: S$PBB,NTX,, | Performed by: NURSE PRACTITIONER

## 2018-09-14 PROCEDURE — 87070 CULTURE OTHR SPECIMN AEROBIC: CPT | Mod: TXP

## 2018-09-14 PROCEDURE — 99999 PR PBB SHADOW E&M-EST. PATIENT-LVL III: CPT | Mod: PBBFAC,TXP,, | Performed by: NURSE PRACTITIONER

## 2018-09-14 PROCEDURE — 49083 ABD PARACENTESIS W/IMAGING: CPT | Mod: NTX

## 2018-09-14 PROCEDURE — 63600175 PHARM REV CODE 636 W HCPCS: Mod: JG,TXP | Performed by: INTERNAL MEDICINE

## 2018-09-14 PROCEDURE — 99213 OFFICE O/P EST LOW 20 MIN: CPT | Mod: PBBFAC,25,TXP | Performed by: NURSE PRACTITIONER

## 2018-09-14 PROCEDURE — P9047 ALBUMIN (HUMAN), 25%, 50ML: HCPCS | Mod: JG,TXP | Performed by: INTERNAL MEDICINE

## 2018-09-14 PROCEDURE — 89051 BODY FLUID CELL COUNT: CPT | Mod: NTX

## 2018-09-14 PROCEDURE — 87075 CULTR BACTERIA EXCEPT BLOOD: CPT | Mod: NTX

## 2018-09-14 RX ORDER — PEN NEEDLE, DIABETIC 30 GX3/16"
NEEDLE, DISPOSABLE MISCELLANEOUS
Qty: 100 EACH | Refills: 3 | Status: SHIPPED | OUTPATIENT
Start: 2018-09-14 | End: 2019-01-02 | Stop reason: SDUPTHER

## 2018-09-14 RX ORDER — INSULIN ASPART 100 [IU]/ML
12 INJECTION, SOLUTION INTRAVENOUS; SUBCUTANEOUS
Qty: 15 ML | Refills: 1 | Status: ON HOLD | OUTPATIENT
Start: 2018-09-14 | End: 2018-09-21 | Stop reason: SDUPTHER

## 2018-09-14 RX ORDER — ALBUMIN HUMAN 250 G/1000ML
25 SOLUTION INTRAVENOUS CONTINUOUS PRN
Status: DISCONTINUED | OUTPATIENT
Start: 2018-09-14 | End: 2018-09-15 | Stop reason: HOSPADM

## 2018-09-14 RX ORDER — INSULIN ASPART 100 [IU]/ML
12 INJECTION, SOLUTION INTRAVENOUS; SUBCUTANEOUS
Qty: 15 ML | Refills: 1 | Status: CANCELLED
Start: 2018-09-14

## 2018-09-14 RX ADMIN — ALBUMIN (HUMAN) 25 G: 25 SOLUTION INTRAVENOUS at 12:09

## 2018-09-14 RX ADMIN — ALBUMIN (HUMAN) 25 G: 25 SOLUTION INTRAVENOUS at 01:09

## 2018-09-14 NOTE — TELEPHONE ENCOUNTER
Message received by Email from Jennifer Jennings in international:    Alyssia,    Her daughter wants to know if Scarlett can start with the supplements today.  Also, she said that Lantus was decrease from 18 units to 12 units and the glucose is really high.  She wants to know if they need to increase the dose to 18 again.    Thanks,    Jennifer Chung      Response after consulting with Rita Rubin, NP    Re: blood sugars and Lantus insulin  If Angela can keep a daily record of her mothers blood sugars and be correct in that these are taken before meals, so that they are not falsely elevated due to food intake, Rita Rubin can review and make adjustments to the patients insulin.  She has offered to do this during appointments. Adjusting insulin incorrectly, in particular, Lantus can cause a steep blood glucose drop at night which would be unsafe for Ms. Pantoja.  Again the diabetes educator will be very helpful next week.   Thanks  Alyssia

## 2018-09-14 NOTE — PROGRESS NOTES
Para completed, pt tolerated well. No apparent distress noted. 6.2 Liters removed from left abd, mepore applied CDI. Labs collected and sent. Albumin 200 ml given per md order.  Discharge instructions reviewed and acknowledged. Pt discharged via wheelchair and private vehicle.

## 2018-09-14 NOTE — LETTER
September 17, 2018      Rita Rubin, KEEGAN  [address]  [facility] [id number]           [facility]  [address]  [facility] [id number]  Phone: [phone]          Patient: Scarlett Reddy   MR Number: [id number]   YOB: 1950   Date of Visit: 9/14/2018       Dear Rita Rubin:    Thank you for referring Scarlett Reddy to me for evaluation. Attached you will find relevant portions of my assessment and plan of care.    If you have questions, please do not hesitate to call me. I look forward to following Scarlett Reddy along with you.    Sincerely,    Esme Kumar NP    Enclosure  CC:  No Recipients    If you would like to receive this communication electronically, please contact [email or web address] or [phone] to request more information on [employer] Link access.    For providers and/or their staff who would like to refer a patient to [facility], please contact us through our one-stop-shop provider referral line, [facility], [place], at [phone].    If you feel you have received this communication in error or would no longer like to receive these types of communications, please e-mail [email or web address]

## 2018-09-14 NOTE — H&P
Radiology History & Physical      SUBJECTIVE:     Chief Complaint: ascites    History of Present Illness:  Scarlett Reddy is a 68 y.o. female who presents for ultrasound guided paracentesis  Past Medical History:   Diagnosis Date    Anemia requiring transfusions 8/16/2018    Ascites     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 8/15/2018    Cirrhosis     Cough 8/30/2018    Esophageal varices     Esophageal varices without bleeding 8/15/2018    Hepatic encephalopathy     Hepatitis B     Hepatitis D virus infection     Hypersplenism     Hyponatremia     Hypotension     Liver transplant candidate     Neutropenia     Portal hypertension     Severe malnutrition 8/16/2018    Type 2 diabetes mellitus, with long-term current use of insulin 8/15/2018     Past Surgical History:   Procedure Laterality Date    EGD (ESOPHAGOGASTRODUODENOSCOPY) N/A 8/17/2018    Performed by Travon Delgadillo MD at [facility] ([address] [place] [address])    ESOPHAGOGASTRODUODENOSCOPY N/A 8/17/2018    Procedure: EGD (ESOPHAGOGASTRODUODENOSCOPY);  Surgeon: Travon Delgadillo MD;  Location: [facility] ([address] [place] [address]);  Service: Endoscopy;  Laterality: N/A;       Home Meds:   Prior to Admission medications    Medication Sig Start Date End Date Taking? Authorizing Provider   blood sugar diagnostic (ACCU-CHEK DC) Strp 1 strip by Misc.(Non-Drug; Combo Route) route 3 (three) times daily.    Historical Provider, MD   cholestyramine (QUESTRAN) 4 gram packet Take 1 packet (4 g total) by mouth 2 (two) times daily. 9/12/18 9/12/19  Yony Burns MD   entecavir (BARACLUDE) 0.5 MG Tab Take 1 tablet (0.5 mg total) by mouth once daily. 9/4/18   Rita Rubin NP   insulin aspart U-100 (NOVOLOG) 100 unit/mL injection Inject 12 Units into the skin 3 (three) times daily before meals. Plus medium dose sliding scale. 9/4/18   Rita Rubin NP   insulin glargine (LANTUS) 100 unit/mL injection Inject 12 Units into the skin every evening. 9/12/18   Yony Burns  MD   lactulose (CHRONULAC) 10 gram/15 mL solution Take 30 mLs (20 g total) by mouth 3 (three) times daily. Adjust dose to have 4 bowel movements daily 9/12/18   Yony Burns MD   lidocaine-prilocaine (EMLA) cream Apply topically as needed. 8/31/18   Cristobal Marmolejo Jr., MD   megestrol (MEGACE) 20 MG Tab Take 1 tablet (20 mg total) by mouth once daily. 9/10/18 9/10/19  Rita Rubin NP   midodrine (PROAMATINE) 5 MG Tab Take 3 tablets (15 mg total) by mouth every 8 (eight) hours. 8/24/18 2/20/19  Gigi Sprague MD   rifAXIMin (XIFAXAN) 550 mg Tab Take 1 tablet (550 mg total) by mouth 2 (two) times daily. 8/27/18   Rita Rubin NP   sodium bicarbonate 650 MG tablet Take 2 tablets (1,300 mg total) by mouth 2 (two) times daily. 8/24/18 8/24/19  Gigi Sprague MD     Anticoagulants/Antiplatelets: no anticoagulation    Allergies: Review of patient's allergies indicates:  No Known Allergies  Sedation History:  no adverse reactions    Review of Systems:   Hematological: no known coagulopathies  Respiratory: no shortness of breath  Cardiovascular: no chest pain  Gastrointestinal: no abdominal pain  Genito-Urinary: no dysuria  Musculoskeletal: negative  Neurological: no TIA or stroke symptoms         OBJECTIVE:     Vital Signs (Most Recent)       Physical Exam:  ASA: 2  Mallampati: n/a    General: no acute distress  Mental Status: alert and oriented to person, place and time  HEENT: normocephalic, atraumatic  Chest: unlabored breathing  Heart: regular heart rate  Abdomen: distended  Extremity: moves all extremities    Laboratory  Lab Results   Component Value Date    INR 1.2 09/12/2018       Lab Results   Component Value Date    WBC 5.08 09/12/2018    HGB 9.0 (L) 09/12/2018    HCT 26.8 (L) 09/12/2018    MCV 94 09/12/2018    PLT 68 (L) 09/12/2018      Lab Results   Component Value Date    GLU 93 09/12/2018     (L) 09/12/2018    K 3.8 09/12/2018    CL 95 09/12/2018    CO2 24 09/12/2018    BUN 50 (H) 09/12/2018     CREATININE 1.0 09/12/2018    CALCIUM 9.4 09/12/2018    MG 2.0 09/12/2018    ALT 24 09/12/2018    AST 45 (H) 09/12/2018    ALBUMIN 3.8 09/12/2018    BILITOT 2.8 (H) 09/12/2018       ASSESSMENT/PLAN:     Sedation Plan: local  Patient will undergo ultrasound guided paracentesis.    ASHOK Boston, FNP  Interventional Radiology  [phone] spectralink

## 2018-09-14 NOTE — PROCEDURES
Radiology Post-Procedure Note    Pre Op Diagnosis: Ascites  Post Op Diagnosis: Same    Procedure: Ultrasound Guided Paracentesis    Procedure performed by: Juan Luis ALLISON, Elizabeth     Written Informed Consent Obtained: Yes  Specimen Removed: YES peach colored  Estimated Blood Loss: Minimal    Findings:   Successful paracentesis.  Albumin administered PRN per protocol.    Patient tolerated procedure well.    Elizabeth Mcknight, APRN, FNP  Interventional Radiology  [phone] spectralink

## 2018-09-14 NOTE — DISCHARGE INSTRUCTIONS
[facility] [phone] (MON-FRI 8 AM- 5PM). Radiology Resident on call [phone].

## 2018-09-17 ENCOUNTER — TELEPHONE (OUTPATIENT)
Dept: TRANSPLANT | Facility: CLINIC | Age: 68
End: 2018-09-17

## 2018-09-17 ENCOUNTER — HOSPITAL ENCOUNTER (INPATIENT)
Facility: HOSPITAL | Age: 68
LOS: 4 days | Discharge: HOME | DRG: 640 | End: 2018-09-21
Attending: EMERGENCY MEDICINE | Admitting: SURGERY
Payer: COMMERCIAL

## 2018-09-17 DIAGNOSIS — Z76.82 LIVER TRANSPLANT CANDIDATE: ICD-10-CM

## 2018-09-17 DIAGNOSIS — R53.1 WEAKNESS: Primary | ICD-10-CM

## 2018-09-17 DIAGNOSIS — K74.60 CHRONIC HEPATITIS B WITH DELTA AGENT WITH CIRRHOSIS: ICD-10-CM

## 2018-09-17 DIAGNOSIS — E87.1 HYPONATREMIA: ICD-10-CM

## 2018-09-17 DIAGNOSIS — E11.42 TYPE 2 DIABETES MELLITUS WITH DIABETIC POLYNEUROPATHY, WITH LONG-TERM CURRENT USE OF INSULIN: ICD-10-CM

## 2018-09-17 DIAGNOSIS — Z79.4 TYPE 2 DIABETES MELLITUS WITH DIABETIC POLYNEUROPATHY, WITH LONG-TERM CURRENT USE OF INSULIN: ICD-10-CM

## 2018-09-17 DIAGNOSIS — K76.82 HEPATIC ENCEPHALOPATHY: ICD-10-CM

## 2018-09-17 DIAGNOSIS — K65.2 SPONTANEOUS BACTERIAL PERITONITIS: ICD-10-CM

## 2018-09-17 DIAGNOSIS — R18.8 OTHER ASCITES: ICD-10-CM

## 2018-09-17 DIAGNOSIS — R07.9 CHEST PAIN IN ADULT: ICD-10-CM

## 2018-09-17 DIAGNOSIS — E87.6 HYPOKALEMIA: ICD-10-CM

## 2018-09-17 DIAGNOSIS — E43 SEVERE MALNUTRITION: ICD-10-CM

## 2018-09-17 DIAGNOSIS — B18.0 CHRONIC HEPATITIS B WITH DELTA AGENT WITH CIRRHOSIS: ICD-10-CM

## 2018-09-17 LAB
ALBUMIN SERPL BCP-MCNC: 3.7 G/DL
ALBUMIN SERPL BCP-MCNC: 4.1 G/DL
ALP SERPL-CCNC: 171 U/L
ALT SERPL W/O P-5'-P-CCNC: 43 U/L
AMMONIA PLAS-SCNC: 50 UMOL/L
ANION GAP SERPL CALC-SCNC: 12 MMOL/L
ANION GAP SERPL CALC-SCNC: 12 MMOL/L
AST SERPL-CCNC: 72 U/L
BACTERIA #/AREA URNS AUTO: ABNORMAL /HPF
BACTERIA SPEC AEROBE CULT: NO GROWTH
BACTERIA SPEC ANAEROBE CULT: NORMAL
BASOPHILS # BLD AUTO: 0.02 K/UL
BASOPHILS NFR BLD: 0.3 %
BILIRUB SERPL-MCNC: 2.5 MG/DL
BILIRUB UR QL STRIP: NEGATIVE
BUN SERPL-MCNC: 73 MG/DL
BUN SERPL-MCNC: 74 MG/DL
CALCIUM SERPL-MCNC: 9.4 MG/DL
CALCIUM SERPL-MCNC: 9.5 MG/DL
CHLORIDE SERPL-SCNC: 89 MMOL/L
CHLORIDE SERPL-SCNC: 89 MMOL/L
CLARITY UR REFRACT.AUTO: CLEAR
CO2 SERPL-SCNC: 20 MMOL/L
CO2 SERPL-SCNC: 20 MMOL/L
COLOR UR AUTO: YELLOW
CREAT SERPL-MCNC: 1.4 MG/DL
CREAT SERPL-MCNC: 1.4 MG/DL
DIFFERENTIAL METHOD: ABNORMAL
EOSINOPHIL # BLD AUTO: 0 K/UL
EOSINOPHIL NFR BLD: 0.5 %
ERYTHROCYTE [DISTWIDTH] IN BLOOD BY AUTOMATED COUNT: 16.9 %
EST. GFR  (AFRICAN AMERICAN): 44.2 ML/MIN/1.73 M^2
EST. GFR  (AFRICAN AMERICAN): 44.2 ML/MIN/1.73 M^2
EST. GFR  (NON AFRICAN AMERICAN): 38.4 ML/MIN/1.73 M^2
EST. GFR  (NON AFRICAN AMERICAN): 38.4 ML/MIN/1.73 M^2
GLUCOSE SERPL-MCNC: 315 MG/DL
GLUCOSE SERPL-MCNC: 343 MG/DL
GLUCOSE UR QL STRIP: ABNORMAL
HCT VFR BLD AUTO: 31.2 %
HGB BLD-MCNC: 10.7 G/DL
HGB UR QL STRIP: ABNORMAL
HYALINE CASTS UR QL AUTO: 4 /LPF
IMM GRANULOCYTES # BLD AUTO: 0.06 K/UL
IMM GRANULOCYTES NFR BLD AUTO: 1 %
INR PPP: 1.1
KETONES UR QL STRIP: NEGATIVE
LACTATE SERPL-SCNC: 1.7 MMOL/L
LEUKOCYTE ESTERASE UR QL STRIP: NEGATIVE
LYMPHOCYTES # BLD AUTO: 0.4 K/UL
LYMPHOCYTES NFR BLD: 6.4 %
MCH RBC QN AUTO: 32 PG
MCHC RBC AUTO-ENTMCNC: 34.3 G/DL
MCV RBC AUTO: 93 FL
MICROSCOPIC COMMENT: ABNORMAL
MONOCYTES # BLD AUTO: 0.5 K/UL
MONOCYTES NFR BLD: 7.7 %
NEUTROPHILS # BLD AUTO: 5 K/UL
NEUTROPHILS NFR BLD: 84.1 %
NITRITE UR QL STRIP: NEGATIVE
NRBC BLD-RTO: 0 /100 WBC
OSMOLALITY UR: 546 MOSM/KG
PH UR STRIP: 5 [PH] (ref 5–8)
PHOSPHATE SERPL-MCNC: 3.4 MG/DL
PLATELET # BLD AUTO: 79 K/UL
PMV BLD AUTO: 10.9 FL
POCT GLUCOSE: 358 MG/DL (ref 70–110)
POCT GLUCOSE: 370 MG/DL (ref 70–110)
POTASSIUM SERPL-SCNC: 3 MMOL/L
POTASSIUM SERPL-SCNC: 4.5 MMOL/L
PROT SERPL-MCNC: 6.5 G/DL
PROT UR QL STRIP: NEGATIVE
PROTHROMBIN TIME: 11.5 SEC
RBC # BLD AUTO: 3.34 M/UL
RBC #/AREA URNS AUTO: 0 /HPF (ref 0–4)
SODIUM SERPL-SCNC: 119 MMOL/L
SODIUM SERPL-SCNC: 121 MMOL/L
SODIUM SERPL-SCNC: 121 MMOL/L
SODIUM UR-SCNC: <20 MMOL/L
SP GR UR STRIP: 1.01 (ref 1–1.03)
SQUAMOUS #/AREA URNS AUTO: 1 /HPF
TROPONIN I SERPL DL<=0.01 NG/ML-MCNC: <0.006 NG/ML
URN SPEC COLLECT METH UR: ABNORMAL
UROBILINOGEN UR STRIP-ACNC: NEGATIVE EU/DL
WBC # BLD AUTO: 5.97 K/UL
WBC #/AREA URNS AUTO: 1 /HPF (ref 0–5)

## 2018-09-17 PROCEDURE — 99285 EMERGENCY DEPT VISIT HI MDM: CPT | Mod: NTX,,, | Performed by: EMERGENCY MEDICINE

## 2018-09-17 PROCEDURE — 25000003 PHARM REV CODE 250: Mod: NTX | Performed by: PHYSICIAN ASSISTANT

## 2018-09-17 PROCEDURE — P9047 ALBUMIN (HUMAN), 25%, 50ML: HCPCS | Mod: JG,NTX | Performed by: PHYSICIAN ASSISTANT

## 2018-09-17 PROCEDURE — 85025 COMPLETE CBC W/AUTO DIFF WBC: CPT | Mod: 91,NTX

## 2018-09-17 PROCEDURE — 87040 BLOOD CULTURE FOR BACTERIA: CPT | Mod: NTX

## 2018-09-17 PROCEDURE — 63600175 PHARM REV CODE 636 W HCPCS: Mod: NTX | Performed by: NURSE PRACTITIONER

## 2018-09-17 PROCEDURE — 63600175 PHARM REV CODE 636 W HCPCS: Mod: JG,NTX | Performed by: PHYSICIAN ASSISTANT

## 2018-09-17 PROCEDURE — 80053 COMPREHEN METABOLIC PANEL: CPT | Mod: 91,NTX

## 2018-09-17 PROCEDURE — 83605 ASSAY OF LACTIC ACID: CPT | Mod: NTX

## 2018-09-17 PROCEDURE — 99285 EMERGENCY DEPT VISIT HI MDM: CPT | Mod: 25,NTX

## 2018-09-17 PROCEDURE — 25000003 PHARM REV CODE 250: Mod: NTX | Performed by: NURSE PRACTITIONER

## 2018-09-17 PROCEDURE — 85610 PROTHROMBIN TIME: CPT | Mod: 91,NTX

## 2018-09-17 PROCEDURE — 80069 RENAL FUNCTION PANEL: CPT | Mod: NTX

## 2018-09-17 PROCEDURE — 36415 COLL VENOUS BLD VENIPUNCTURE: CPT | Mod: NTX

## 2018-09-17 PROCEDURE — 63600175 PHARM REV CODE 636 W HCPCS: Mod: NTX | Performed by: PHYSICIAN ASSISTANT

## 2018-09-17 PROCEDURE — 84300 ASSAY OF URINE SODIUM: CPT | Mod: NTX

## 2018-09-17 PROCEDURE — 84484 ASSAY OF TROPONIN QUANT: CPT | Mod: NTX

## 2018-09-17 PROCEDURE — 99223 1ST HOSP IP/OBS HIGH 75: CPT | Mod: NTX,,, | Performed by: NURSE PRACTITIONER

## 2018-09-17 PROCEDURE — 63600175 PHARM REV CODE 636 W HCPCS: Mod: NTX | Performed by: EMERGENCY MEDICINE

## 2018-09-17 PROCEDURE — 84295 ASSAY OF SERUM SODIUM: CPT | Mod: NTX

## 2018-09-17 PROCEDURE — 82140 ASSAY OF AMMONIA: CPT | Mod: NTX

## 2018-09-17 PROCEDURE — 96372 THER/PROPH/DIAG INJ SC/IM: CPT | Mod: NTX

## 2018-09-17 PROCEDURE — 81001 URINALYSIS AUTO W/SCOPE: CPT | Mod: NTX

## 2018-09-17 PROCEDURE — 99223 1ST HOSP IP/OBS HIGH 75: CPT | Mod: NTX,,, | Performed by: PHYSICIAN ASSISTANT

## 2018-09-17 PROCEDURE — 20600001 HC STEP DOWN PRIVATE ROOM: Mod: NTX

## 2018-09-17 PROCEDURE — 96365 THER/PROPH/DIAG IV INF INIT: CPT | Mod: NTX

## 2018-09-17 PROCEDURE — 83935 ASSAY OF URINE OSMOLALITY: CPT | Mod: NTX

## 2018-09-17 RX ORDER — SODIUM CHLORIDE 0.9 % (FLUSH) 0.9 %
3 SYRINGE (ML) INJECTION
Status: DISCONTINUED | OUTPATIENT
Start: 2018-09-17 | End: 2018-09-21 | Stop reason: HOSPADM

## 2018-09-17 RX ORDER — ALBUMIN HUMAN 250 G/1000ML
25 SOLUTION INTRAVENOUS EVERY 6 HOURS
Status: COMPLETED | OUTPATIENT
Start: 2018-09-18 | End: 2018-09-18

## 2018-09-17 RX ORDER — INSULIN ASPART 100 [IU]/ML
8-12 INJECTION, SOLUTION INTRAVENOUS; SUBCUTANEOUS
Status: DISCONTINUED | OUTPATIENT
Start: 2018-09-18 | End: 2018-09-18

## 2018-09-17 RX ORDER — MIDODRINE HYDROCHLORIDE 5 MG/1
10 TABLET ORAL EVERY 8 HOURS
Status: DISCONTINUED | OUTPATIENT
Start: 2018-09-17 | End: 2018-09-17

## 2018-09-17 RX ORDER — LACTULOSE 10 G/15ML
20 SOLUTION ORAL 3 TIMES DAILY
Status: DISCONTINUED | OUTPATIENT
Start: 2018-09-17 | End: 2018-09-19

## 2018-09-17 RX ORDER — IBUPROFEN 200 MG
16 TABLET ORAL
Status: DISCONTINUED | OUTPATIENT
Start: 2018-09-17 | End: 2018-09-19

## 2018-09-17 RX ORDER — IBUPROFEN 200 MG
24 TABLET ORAL
Status: DISCONTINUED | OUTPATIENT
Start: 2018-09-17 | End: 2018-09-19

## 2018-09-17 RX ORDER — HEPARIN SODIUM 5000 [USP'U]/ML
5000 INJECTION, SOLUTION INTRAVENOUS; SUBCUTANEOUS EVERY 12 HOURS
Status: DISCONTINUED | OUTPATIENT
Start: 2018-09-17 | End: 2018-09-21 | Stop reason: HOSPADM

## 2018-09-17 RX ORDER — POTASSIUM CHLORIDE 750 MG/1
40 CAPSULE, EXTENDED RELEASE ORAL ONCE
Status: DISCONTINUED | OUTPATIENT
Start: 2018-09-17 | End: 2018-09-17

## 2018-09-17 RX ORDER — GLUCAGON 1 MG
1 KIT INJECTION
Status: DISCONTINUED | OUTPATIENT
Start: 2018-09-17 | End: 2018-09-19

## 2018-09-17 RX ORDER — LIDOCAINE AND PRILOCAINE 25; 25 MG/G; MG/G
CREAM TOPICAL
Status: DISCONTINUED | OUTPATIENT
Start: 2018-09-17 | End: 2018-09-21 | Stop reason: HOSPADM

## 2018-09-17 RX ORDER — MIDODRINE HYDROCHLORIDE 5 MG/1
15 TABLET ORAL EVERY 8 HOURS
Status: DISCONTINUED | OUTPATIENT
Start: 2018-09-17 | End: 2018-09-21 | Stop reason: HOSPADM

## 2018-09-17 RX ORDER — ALBUMIN HUMAN 250 G/1000ML
12.5 SOLUTION INTRAVENOUS ONCE
Status: COMPLETED | OUTPATIENT
Start: 2018-09-17 | End: 2018-09-17

## 2018-09-17 RX ORDER — ALBUMIN HUMAN 250 G/1000ML
25 SOLUTION INTRAVENOUS ONCE
Status: COMPLETED | OUTPATIENT
Start: 2018-09-17 | End: 2018-09-17

## 2018-09-17 RX ORDER — INSULIN ASPART 100 [IU]/ML
12 INJECTION, SOLUTION INTRAVENOUS; SUBCUTANEOUS
Status: DISCONTINUED | OUTPATIENT
Start: 2018-09-17 | End: 2018-09-17

## 2018-09-17 RX ORDER — MEGESTROL ACETATE 20 MG/1
20 TABLET ORAL DAILY
Status: DISCONTINUED | OUTPATIENT
Start: 2018-09-17 | End: 2018-09-17

## 2018-09-17 RX ORDER — SODIUM BICARBONATE 650 MG/1
1300 TABLET ORAL 2 TIMES DAILY
Status: DISCONTINUED | OUTPATIENT
Start: 2018-09-17 | End: 2018-09-18

## 2018-09-17 RX ORDER — MIDODRINE HYDROCHLORIDE 5 MG/1
15 TABLET ORAL EVERY 8 HOURS
Status: DISCONTINUED | OUTPATIENT
Start: 2018-09-17 | End: 2018-09-17

## 2018-09-17 RX ORDER — INSULIN ASPART 100 [IU]/ML
0-5 INJECTION, SOLUTION INTRAVENOUS; SUBCUTANEOUS
Status: DISCONTINUED | OUTPATIENT
Start: 2018-09-17 | End: 2018-09-18

## 2018-09-17 RX ORDER — ENTECAVIR 0.5 MG/1
0.5 TABLET, FILM COATED ORAL DAILY
Status: DISCONTINUED | OUTPATIENT
Start: 2018-09-18 | End: 2018-09-21 | Stop reason: HOSPADM

## 2018-09-17 RX ORDER — SODIUM BICARBONATE 650 MG/1
1300 TABLET ORAL 2 TIMES DAILY
Status: DISCONTINUED | OUTPATIENT
Start: 2018-09-17 | End: 2018-09-17

## 2018-09-17 RX ADMIN — SODIUM BICARBONATE 650 MG TABLET 1300 MG: at 02:09

## 2018-09-17 RX ADMIN — SODIUM BICARBONATE 650 MG TABLET 1300 MG: at 09:09

## 2018-09-17 RX ADMIN — MIDODRINE HYDROCHLORIDE 15 MG: 5 TABLET ORAL at 09:09

## 2018-09-17 RX ADMIN — ALBUMIN HUMAN 12.5 G: 0.25 SOLUTION INTRAVENOUS at 11:09

## 2018-09-17 RX ADMIN — LACTULOSE 20 G: 20 SOLUTION ORAL at 02:09

## 2018-09-17 RX ADMIN — INSULIN ASPART 12 UNITS: 100 INJECTION, SOLUTION INTRAVENOUS; SUBCUTANEOUS at 12:09

## 2018-09-17 RX ADMIN — INSULIN ASPART 5 UNITS: 100 INJECTION, SOLUTION INTRAVENOUS; SUBCUTANEOUS at 12:09

## 2018-09-17 RX ADMIN — RIFAXIMIN 550 MG: 550 TABLET ORAL at 01:09

## 2018-09-17 RX ADMIN — INSULIN ASPART 5 UNITS: 100 INJECTION, SOLUTION INTRAVENOUS; SUBCUTANEOUS at 06:09

## 2018-09-17 RX ADMIN — INSULIN ASPART 8 UNITS: 100 INJECTION, SOLUTION INTRAVENOUS; SUBCUTANEOUS at 06:09

## 2018-09-17 RX ADMIN — POTASSIUM BICARBONATE 50 MEQ: 25 TABLET, EFFERVESCENT ORAL at 02:09

## 2018-09-17 RX ADMIN — LACTULOSE 20 G: 20 SOLUTION ORAL at 09:09

## 2018-09-17 RX ADMIN — RIFAXIMIN 550 MG: 550 TABLET ORAL at 09:09

## 2018-09-17 RX ADMIN — ALBUMIN HUMAN 25 G: 0.25 SOLUTION INTRAVENOUS at 06:09

## 2018-09-17 RX ADMIN — MIDODRINE HYDROCHLORIDE 15 MG: 5 TABLET ORAL at 01:09

## 2018-09-17 RX ADMIN — INSULIN DETEMIR 12 UNITS: 100 INJECTION, SOLUTION SUBCUTANEOUS at 09:09

## 2018-09-17 NOTE — ED NOTES
Hourly rounding complete. Patient resting in stretcher and is in NAD at this time. Pt is awake and alert, VSS, respirations even and unlabored. Pt updated on POC. Family at bedside. [facility]  at bedside. Bed low and locked with side rails up x2, call bell in pt reach. Pt voices no needs at this time.

## 2018-09-17 NOTE — ED NOTES
Patient moved to ED room 3 via wheelchair, patient assisted onto stretcher and changed into a gown. Patient placed on cardiac monitor, continuous pulse oximetry and automatic blood pressure cuff. Bed placed in low locked position, side rails up x 2, call light is within reach of patient or family, orientation to room and explanation of wait provided to family and patient, alarms set and turned on for monitor and pulse ox, awaiting MD evaluation and orders, will continue to monitor.

## 2018-09-17 NOTE — HPI
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 She is listed for liver transplant with MELD 23. She presented to IR for paracentesis today (9/17) however was noted to be hyponatremic on labs (Na 122) and was sent to ED. In the ED she reported not feeling well this weekend, c/o diffuse chest discomfort which she attributes to anxiety about labs and weekly testing and has since resolved, and general fatigue. She and her daughter express concerns over elevated glucose also. C/o chronic nausea, no vomiting. Last paracentesis was 9/14 and she had 6.8L removed, , 22% segs, cultures NGTD. She is scheduled for outpatient paracentesis tomorrow 9/18, will reschedule for inpatient. She reports multiple BM's daily with lactulose, although not as frequent over the weekend. Na decreased on outpatient labs (122) but likely related to hyperglycemia (274). Diuretics have remained on hold for hyponatremia. In addition, patient has been non-compliant with low Na and fluid restricted diet, which she and her family report is d/t her poor appetitite. She will be admitted for monitoring of her sodium and glucose, 1L fluid restricted low sodium diet, albumin administration, urine studies, endocrine consult, and nutrition consult. Will plan for paracentesis tomorrow.

## 2018-09-17 NOTE — ASSESSMENT & PLAN NOTE
- patient with chronic ascites. On admit, abdomen distended, but soft.  - off of diuretics d/t hyponatremia, so has been requiring more frequent paracentesis (2x weekly)  - last para 9/14 with 6.8L removed,  with 22% segs, cultures NGTD  - plan for paracentesis tomorrow 9/18, will send cell counts and cultures.

## 2018-09-17 NOTE — TELEPHONE ENCOUNTER
PATIENT NAME: Scarlett [facility] #: [id number]    Lab Results   Component Value Date    CREATININE 1.2 09/17/2018     (L) 09/17/2018    BILITOT 2.8 (H) 09/17/2018    ALBUMIN 3.8 09/17/2018    INR 1.1 09/17/2018     MELD 24  Encephalopathy: 1 - 2  Ascites: moderate  Dialysis: no     Recertification requestor: Charley Simms

## 2018-09-17 NOTE — ASSESSMENT & PLAN NOTE
MELD-Na score: 24 at 9/17/2018 10:50 AM  MELD score: 14 at 9/17/2018 10:50 AM  Calculated from:  Serum Creatinine: 1.4 mg/dL at 9/17/2018 10:50 AM  Serum Sodium: 121 mmol/L (Rounded to 125 mmol/L) at 9/17/2018 10:50 AM  Total Bilirubin: 2.5 mg/dL at 9/17/2018 10:50 AM  INR(ratio): 1.1 at 9/17/2018 10:50 AM  Age: 68 years      - Listed for [facility]TX.   - Now admitted with hyponatremia.

## 2018-09-17 NOTE — ED TRIAGE NOTES
Patient presents from residing in [facility] after receiving call from liver transplant team to come to ED for low sodium and chest pain. [facility]  at bedside. Patient presents with daughter//. Patients daughter states that the patient has had chest pain the last few days but that it worsened last night, denies SOB. Patient denies chest pain and abdominal pain at this time, only complaining of weakness. Patient had 6 liters removed on 9/14/18 from abdomen.

## 2018-09-17 NOTE — ED PROVIDER NOTES
Encounter Date: 9/17/2018    SCRIBE #1 NOTE: I, Son Elvie, am scribing for, and in the presence of,  Dr. Walker. I have scribed the entire note.       History     Chief Complaint   Patient presents with    Liver Failure     waiting for liver, low sodium      Time patient was seen by the provider: 10:15 AM      The patient is a 68 y.o. female with co-morbidities including: cirrhosis, hepatitis B, HTN  who presents with several days of chest pain and weakness. She is currently on the liver transplant list and was seen by IR this morning for possible tap. She was sent to the ED because her sodium was low. Pt denies any leg swellings, fever, headache, eye drainage, nosebleed, ecchymosis. She endorses numbness in her hands. Pt denies any PMHx of cardiac problems. The chest pain has eased and she does not anything for pain at this time      The history is provided by the patient and medical records. A  was used (Japanese).     Review of patient's allergies indicates:  No Known Allergies  Past Medical History:   Diagnosis Date    Anemia requiring transfusions 8/16/2018    Ascites     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 8/15/2018    Cirrhosis     Cough 8/30/2018    Esophageal varices     Esophageal varices without bleeding 8/15/2018    Hepatic encephalopathy     Hepatitis B     Hepatitis D virus infection     Hypersplenism     Hyponatremia     Hypotension     Liver transplant candidate     Neutropenia     Portal hypertension     Severe malnutrition 8/16/2018    Type 2 diabetes mellitus, with long-term current use of insulin 8/15/2018     Past Surgical History:   Procedure Laterality Date    EGD (ESOPHAGOGASTRODUODENOSCOPY) N/A 8/17/2018    Performed by Travon Delgadillo MD at [facility] ([address] [place] [address])    ESOPHAGOGASTRODUODENOSCOPY N/A 8/17/2018    Procedure: EGD (ESOPHAGOGASTRODUODENOSCOPY);  Surgeon: Travon Delgadillo MD;  Location: [facility] ([address] [place] [address]);  Service: Endoscopy;   Laterality: N/A;     History reviewed. No pertinent family history.  Social History     Tobacco Use    Smoking status: Never Smoker    Smokeless tobacco: Never Used   Substance Use Topics    Alcohol use: No     Frequency: Never    Drug use: No     Review of Systems   Constitutional: Negative for fever.   HENT: Negative for nosebleeds.    Eyes: Negative for discharge.   Respiratory: Negative for shortness of breath.    Cardiovascular: Positive for chest pain.   Gastrointestinal: Negative for abdominal pain, nausea and vomiting.   Genitourinary: Negative for dysuria.   Musculoskeletal: Negative for back pain.   Skin: Negative for color change.   Neurological: Positive for weakness and light-headedness.   Hematological: Does not bruise/bleed easily.       Physical Exam     Initial Vitals [09/17/18 0943]   BP Pulse Resp Temp SpO2   (!) 93/47 99 18 97.8 °F (36.6 °C) 99 %      MAP       --         Physical Exam    Constitutional: She is cooperative. She has a sickly appearance. She appears ill.   HENT:   Head: Normocephalic and atraumatic.   Mouth/Throat: Mucous membranes are dry.   Eyes: Lids are normal. No scleral icterus.   Neck: Normal range of motion. Neck supple.   Cardiovascular: Normal rate, regular rhythm and normal heart sounds.   Pulmonary/Chest: Breath sounds normal. No accessory muscle usage. No tachypnea. No respiratory distress.   Abdominal: She exhibits distension. There is no tenderness.   Musculoskeletal: Normal range of motion.   Neurological: She is alert. GCS eye subscore is 4. GCS verbal subscore is 5. GCS motor subscore is 6.   Skin: Skin is warm.         ED Course   Procedures  Labs Reviewed   CBC W/ AUTO DIFFERENTIAL - Abnormal; Notable for the following components:       Result Value    RBC 3.34 (*)     Hemoglobin 10.7 (*)     Hematocrit 31.2 (*)     MCH 32.0 (*)     RDW 16.9 (*)     Platelets 79 (*)     Immature Granulocytes 1.0 (*)     Immature Grans (Abs) 0.06 (*)     Lymph # 0.4 (*)      Gran% 84.1 (*)     Lymph% 6.4 (*)     All other components within normal limits   COMPREHENSIVE METABOLIC PANEL - Abnormal; Notable for the following components:    Sodium 121 (*)     Potassium 3.0 (*)     Chloride 89 (*)     CO2 20 (*)     Glucose 315 (*)     BUN, Bld 74 (*)     Total Bilirubin 2.5 (*)     Alkaline Phosphatase 171 (*)     AST 72 (*)     eGFR if  44.2 (*)     eGFR if non  38.4 (*)     All other components within normal limits   URINALYSIS, REFLEX TO URINE CULTURE - Abnormal; Notable for the following components:    Glucose, UA 2+ (*)     Occult Blood UA 2+ (*)     All other components within normal limits    Narrative:     Preferred Collection Type->Urine, Clean Catch  CUP   SODIUM - Abnormal; Notable for the following components:    Sodium 119 (*)     All other components within normal limits    Narrative:     add on trop per: TEZ JOHNSON MD  09/17/2018  12:28    SODIUM, URINE, RANDOM - Abnormal; Notable for the following components:    Sodium Urine Random <20 (*)     All other components within normal limits    Narrative:     Preferred Collection Type->Urine, Clean Catch  CUP   URINALYSIS MICROSCOPIC - Abnormal; Notable for the following components:    Bacteria, UA Many (*)     Hyaline Casts, UA 4 (*)     All other components within normal limits    Narrative:     Preferred Collection Type->Urine, Clean Catch  CUP   POCT GLUCOSE - Abnormal; Notable for the following components:    POCT Glucose 358 (*)     All other components within normal limits   LACTIC ACID, PLASMA   PROTIME-INR   AMMONIA   TROPONIN I   TROPONIN I    Narrative:     add on trop per: TEZ JOHNSON MD  09/17/2018  12:28    OSMOLALITY, URINE RANDOM    Narrative:     Preferred Collection Type->Urine, Clean Catch  CUP   POCT GLUCOSE MONITORING CONTINUOUS     EKG Readings: (Independently Interpreted)   Initial Reading: No STEMI. Rhythm: Normal Sinus Rhythm. Heart Rate: 99 bpm.        Imaging Results    None          Medical Decision Making:   History:   Old Medical Records: I decided to obtain old medical records.  Initial Assessment:   Patient was on the liver transplant list presenting today with extreme weakness she was at Interventional Radiology whenever she was noted to have a sodium of 122 she also had been complaining of chest discomfort and generalized weakness and was sent down to the emergency room  Clinical Tests:   Lab Tests: Ordered and Reviewed  Medical Tests: Ordered and Reviewed  ED Management:  Will do labs EKG cardiac markers and will consult the liver transplant surgery team            Scribe Attestation:   Scribe #1: I performed the above scribed service and the documentation accurately describes the services I performed. I attest to the accuracy of the note.    Attending Attestation:             Attending ED Notes:   Patient who is on the liver transplant list presenting to the the emergency room because of increasing weakness had been seen in the clinic with blood drawn and was noted to have significant hyponatremia patient was evaluated in the ED the liver transplant surgery team was consulted and the patient was admitted for further care.             Clinical Impression:   The primary encounter diagnosis was Weakness. Diagnoses of Hyponatremia, Other ascites, 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, Hepatic encephalopathy, Liver transplant candidate, Severe malnutrition, and Type 2 diabetes mellitus with diabetic polyneuropathy, with long-term current use of insulin were also pertinent to this visit.      Disposition:   Disposition: Admitted  Condition: Serious                        Kolton Walker MD  09/18/18 0862

## 2018-09-17 NOTE — HPI
Reason for Consult: Management of type 2 DM, Hyperglycemia     Diabetes diagnosis year: 2013        Home Diabetes Medications:  Lantus 12 units SQ q hs, Novolog 12 units SQ with meals, and low correction scale Novolog starting at a BG of 180  Lab Results   Component Value Date    HGBA1C 6.8 (H) 08/16/2018          How often checking glucose at home? >4 x day   BG readings on regimen: 140's am fasting, 180-200's pre lunch and dinner  Hypoglycemia on the regimen?  NADINE- hypo awareness at high 80's  Missed doses on regimen?  No     Diabetes Complications include: neuropathy likely related to diabetes     Complicating diabetes co morbidities:   CIRRHOSIS        HPI:   Patient is a 68 y.o. female with a diagnosis of DM2, decompensated cirrhosis, hepatic encephalopathy, esophageal varices, and hyponatremia. She is listed for liver transplant, admitted for hyponatremia found on labs when patient presented to IR for paracentesis.  Report personal history of DM, positive family history of DM (mother).  Endocrine consulted for DM management.

## 2018-09-17 NOTE — CONSULTS
[facility]  Liver Transplant  Consult Note      Please see H&P dated 9/17/18 by Joselin Montes PA-C.

## 2018-09-17 NOTE — PROGRESS NOTES
History & Physical    SUBJECTIVE:     History of Present Illness:    Ms Reddy is a pleasant 68 y.o. female who is accompanied by her daughter,  and Kisw[place]ili , Haifabrizio, after being referred to me by Rita Merino for prehab evaluation and enrollment. She is here from [place] in the hope of being transplanted and has been struggling w her nutrition, energy levels and ongoing frailty r/t her chronic disease.     We spent about 45 minutes together on the rationale, scientific evidence for prehab and in discussing the interventions. She presents in a wheelchair today and reports she is very fatigued w ongoing anorexia, protein calorie malnutrition and would be amenable to trying the program. I explained to her that it works, provided her body is physically capable of responding and that she must commit to not only the nutrition part of it but also walking daily (even in small 10 minute segments). She and her family are going to participate.       Liver Transplant History:    This is a 68 y.o. 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  Noted h/o variceal bleeding in past notes, small varices noted on recent EGD     Patient had been followed in [facility] for cirrhosis, but has been referred to our hospital to establish care due to worsening hyponatremia, ascites, weakness and liver function, need for tertiary care.      Patient is currently on the liver transplant waitlist as of 8/22/18, listed with MELD 21 since 8/31/18  MELD-Na score: 21 at 9/4/2018  9:10 AM  MELD score: 13 at 9/4/2018  9:10 AM  Calculated from:  Serum Creatinine: 1.3 mg/dL at 9/4/2018  9:10 AM  Serum Sodium: 128 mmol/L at 9/4/2018  9:10 AM  Total Bilirubin: 2.3 mg/dL at 9/4/2018  9:10 AM  INR(ratio): 1.1 at 9/4/2018  9:10 AM  Age: 68 years      Interval HPI:  Cough reported at last visit improved/resolving. Chest Xray after last visit  stable.   Continues weekly para. No abd pain, vomiting. Hyponatremia stable. Had issue of abd cramping, bloating, gas over weekend and lack of BM, used suppository x1 this past weekend and all symptoms resolved after had BM. Now denies any abdominal complaints, having 4+ BM on current lactulose dose     Recent issues:   -- h/o PVT on Enoxaparin: stopped while hospitalized, per hospital med note on 8/21/18, - hold lovenox due to thrombocytopenia while hospitalized. Discussed with Dr. Worrell after last visit, will repeat US in 1 month (end Sept) then determine if needs anticoagulation   -- Ascites requiring weekly para: last 8/29/18, removed 4L, no WBC and Diff on file   -- Malnutrition: reports lack of appetite (chronic), intermittent nausea, poor intake of protein. Upcoming appt today with NED Hill RD, message sent to NED Kumar NP for nutritional prehab, awaiting appt to be scheduled   -- Pruritis : reports worsening itching in past couple of weeks  -- Hyponatremia : stable off diuretics     Interval Hospitalization: 8/14/18 - 8/24/18  -- Hyponatremia due to diuretics (Na of 121), that improved to 129 with holding diuretics.  Requiring paracentesis every 7-10 days  -- Urine cx w (+) kleb pneumo and Enterococcus faecium.  Patient was treated w Cipro 8/16-8/20.  She was changed to Ertapenem 8/20 for 3 days.  Repeat urine cx w candida albicans <50,000- no need to treat.  Her ANC was low 8/23/18 at 739.  ID had changed IV abx to Zosyn for pseudomonas coverage Antibiotics were changed to pip/tazo for pseudomonal coverage given neutropenia. Repeat UA improved.   -- Heme/Onc was also consulted for pancytopenia felt likely drug related and signed off.  -- HE well managed w Lactulose (titrate to 3-4 BMs) and Rifaximin.  Hypotension better controlled w Midodrine 15 mg tid  -- Two solid right pulmonary nodules with the largest measuring 6 mm. Fleischner Society 2017 guidelines recommend follow up with non-contrast chest CT  "at 6-12 months and 18-24 months after discovery       Chief Complaint   Patient presents with    Follow-up       Review of patient's allergies indicates:  No Known Allergies    Current Outpatient Medications   Medication Sig Dispense Refill    blood sugar diagnostic (ACCU-CHEK DC) Strp 1 strip by Misc.(Non-Drug; Combo Route) route 3 (three) times daily.      cholestyramine (QUESTRAN) 4 gram packet Take 1 packet (4 g total) by mouth 2 (two) times daily. 180 packet 3    entecavir (BARACLUDE) 0.5 MG Tab Take 1 tablet (0.5 mg total) by mouth once daily. 30 tablet 3    insulin aspart U-100 (NOVOLOG FLEXPEN U-100 INSULIN) 100 unit/mL InPn pen Inject 12 Units into the skin 3 (three) times daily with meals. Plus correction scale, max TDD 60 units daily 15 mL 1    insulin glargine (LANTUS) 100 unit/mL injection Inject 12 Units into the skin every evening. 15 mL 3    lactulose (CHRONULAC) 10 gram/15 mL solution Take 30 mLs (20 g total) by mouth 3 (three) times daily. Adjust dose to have 4 bowel movements daily 4000 mL 3    lidocaine-prilocaine (EMLA) cream Apply topically as needed. 30 g 1    megestrol (MEGACE) 20 MG Tab Take 1 tablet (20 mg total) by mouth once daily. 30 tablet 11    midodrine (PROAMATINE) 5 MG Tab Take 3 tablets (15 mg total) by mouth every 8 (eight) hours. 270 tablet 5    pen needle, diabetic (BD ULTRA-FINE CITLALI PEN NEEDLE) 32 gauge x 5/32" Ndle To use to inject insulin 4x daily 100 each 3    rifAXIMin (XIFAXAN) 550 mg Tab Take 1 tablet (550 mg total) by mouth 2 (two) times daily. 60 tablet 5    sodium bicarbonate 650 MG tablet Take 2 tablets (1,300 mg total) by mouth 2 (two) times daily. 120 tablet 11     No current facility-administered medications for this visit.        Past Medical History:   Diagnosis Date    Anemia requiring transfusions 8/16/2018    Ascites     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 8/15/2018    Cirrhosis     Cough 8/30/2018    Esophageal varices  " "   Esophageal varices without bleeding 8/15/2018    Hepatic encephalopathy     Hepatitis B     Hepatitis D virus infection     Hypersplenism     Hyponatremia     Hypotension     Liver transplant candidate     Neutropenia     Portal hypertension     Severe malnutrition 8/16/2018    Type 2 diabetes mellitus, with long-term current use of insulin 8/15/2018     Past Surgical History:   Procedure Laterality Date    EGD (ESOPHAGOGASTRODUODENOSCOPY) N/A 8/17/2018    Per[facility] Travon [facility]adillo [facility]    ESOPHAGOGASTRODUODENOSCOPY N/A 8/17/2018    Procedure: EGD (ESOPHAGOGASTRODUODENOSCOPY);  Surgeon: Travon Delgadillo MD;  Location: [facility] ([address] [place] [address]);  Service: Endoscopy;  Laterality: N/A;     No family history on file.  Social History     Tobacco Use    Smoking status: Never Smoker    Smokeless tobacco: Never Used   Substance Use Topics    Alcohol use: No     Frequency: Never    Drug use: No        Review of Systems:  Review of Systems   Constitutional: Positive for activity change, appetite change and fatigue.   HENT: Negative.    Eyes: Negative.    Respiratory: Negative.    Cardiovascular: Positive for leg swelling.   Gastrointestinal: Positive for abdominal distention and constipation.   Endocrine: Negative.    Genitourinary: Negative.    Musculoskeletal: Negative.    Skin: Positive for color change.   Neurological: Positive for weakness.   Psychiatric/Behavioral: Positive for dysphoric mood. The patient is nervous/anxious.        OBJECTIVE:     Vital Signs (Most Recent)  Temp: 97.4 °F (36.3 °C) (09/14/18 1056)  Pulse: 106 (09/14/18 1056)  BP: 98/60 (09/14/18 1056)  5' 4" (1.626 m)  58.6 kg (129 lb 3 oz)     Physical Exam:  Physical Exam   Constitutional: She is oriented to person, place, and time. She appears cachectic. She is cooperative. She appears ill.   Appears chronically ill, sarcopenia of chronic liver disease   HENT:   Head: Normocephalic and atraumatic.   Mouth/Throat: " Uvula is midline, oropharynx is clear and moist and mucous membranes are normal.   Eyes: Conjunctivae and lids are normal. Scleral icterus is present.   Neck: Trachea normal, normal range of motion, full passive range of motion without pain and phonation normal. Neck supple. No thyroid mass and no thyromegaly present.   Cardiovascular: Regular rhythm, S1 normal, S2 normal and normal heart sounds.   Pulses:       Radial pulses are 2+ on the right side, and 2+ on the left side.   Pulmonary/Chest: Effort normal. She has decreased breath sounds in the right lower field and the left lower field.   Abdominal: Soft. She exhibits distension and ascites. Bowel sounds are decreased. There is no tenderness. No hernia.   Musculoskeletal:   Sarcopenia, frailty   Lymphadenopathy:     She has no cervical adenopathy.        Right: No supraclavicular adenopathy present.        Left: No supraclavicular adenopathy present.   Neurological: She is alert and oriented to person, place, and time.   Skin: Skin is warm, dry and intact. Capillary refill takes less than 2 seconds. No rash noted. She is not diaphoretic. There is pallor.   Jaundice of chronic liver disease   Psychiatric: Her speech is normal and behavior is normal. Judgment and thought content normal. Her mood appears anxious. Cognition and memory are normal.       Laboratory  Reviewed and ordered    Diagnostic Results:  reviewed    ASSESSMENT/PLAN:     IMPR: protein calorie malnutrition, sarcopenia, frailty    PLAN:Plan     1. Start prehab program  2. Follow up in 2 weeks

## 2018-09-17 NOTE — ASSESSMENT & PLAN NOTE
- patient on insulin per endo recs  - reorder home regimen with SSI  - BG elevated, likely contributing to hyponatremia  - consult placed to endocrinology for assistance

## 2018-09-17 NOTE — ASSESSMENT & PLAN NOTE
BG goal 140-180      Levemir 12 units HS.   Novolog 8-12 units with meals.   BG monitoring ac/hs and low dose correction scale.     Discharge plans- TBD

## 2018-09-17 NOTE — ASSESSMENT & PLAN NOTE
"- Supplements ordered for nutrition.   - Dietary consult for calorie count, "prehab", poor appetite, and low sodium diet education    "

## 2018-09-17 NOTE — H&P
[facility]  Liver Transplant  History & Physical    Patient Name: Scarlett Reddy  MRN: [id number]  Admission Date: 9/17/2018  Code Status: Full Code  Primary Care Provider: Primary Doctor No    Subjective:     History of Present Illness:  Scarlett Reddy is a 68 yr old female with PMH of decompensated cirrhosis due to Hep B c/b ascites requiring twice weekly paracentesis, hepatic encephalopathy, esophageal varices, and hyponatremia. She is listed for liver transplant with MELD 23. She presented to IR for paracentesis today (9/17) however was noted to be hyponatremic on labs (Na 122) and was sent to ED. In the ED she reported not feeling well this weekend, c/o diffuse chest discomfort which she attributes to anxiety about labs and weekly testing and has since resolved, and general fatigue. She and her daughter express concerns over elevated glucose also. C/o chronic nausea, no vomiting. Last paracentesis was 9/14 and she had 6.8L removed, , 22% segs, cultures NGTD. She is scheduled for outpatient paracentesis tomorrow 9/18, will reschedule for inpatient. She reports multiple BM's daily with lactulose, although not as frequent over the weekend. Na decreased on outpatient labs (122) but likely related to hyperglycemia (274). Diuretics have remained on hold for hyponatremia. In addition, patient has been non-compliant with low Na and fluid restricted diet, which she and her family report is d/t her poor appetitite. She will be admitted for monitoring of her sodium and glucose, 1L fluid restricted low sodium diet, albumin administration, urine studies, endocrine consult, and nutrition consult. Will plan for paracentesis tomorrow.          Past Medical History:   Diagnosis Date    Anemia requiring transfusions 8/16/2018    Ascites     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 8/15/2018    Cirrhosis     Cough 8/30/2018    Esophageal varices     Esophageal varices without bleeding  8/15/2018    Hepatic encephalopathy     Hepatitis B     Hepatitis D virus infection     Hypersplenism     Hyponatremia     Hypotension     Liver transplant candidate     Neutropenia     Portal hypertension     Severe malnutrition 8/16/2018    Type 2 diabetes mellitus, with long-term current use of insulin 8/15/2018       Past Surgical History:   Procedure Laterality Date    EGD (ESOPHAGOGASTRODUODENOSCOPY) N/A 8/17/2018    Performed by Travon Delgadillo MD at [facility] (2ND FLR)    ESOPHAGOGASTRODUODENOSCOPY N/A 8/17/2018    Procedure: EGD (ESOPHAGOGASTRODUODENOSCOPY);  Surgeon: Travon Delgadillo MD;  [facility]  Service: Endoscopy;  Laterality: N/A;       Review of patient's allergies indicates:  No Known Allergies    Family History     None        Tobacco Use    Smoking status: Never Smoker    Smokeless tobacco: Never Used   Substance and Sexual Activity    Alcohol use: No     Frequency: Never    Drug use: No    Sexual activity: Not on file         (Not in a hospital admission)    Review of Systems   Constitutional: Positive for appetite change. Negative for chills, fatigue and fever.   HENT: Negative for congestion and facial swelling.    Eyes: Negative for pain, discharge and visual disturbance.   Respiratory: Negative for cough, chest tightness and shortness of breath.    Cardiovascular: Negative for chest pain, palpitations and leg swelling.   Gastrointestinal: Positive for abdominal distention (mildly distended, soft) and nausea (chronic). Negative for abdominal pain, constipation, diarrhea and vomiting.   Endocrine: Negative.    Genitourinary: Negative for difficulty urinating, dysuria, frequency and urgency.   Musculoskeletal: Negative for back pain and neck pain.   Skin: Negative for color change, pallor, rash and wound.   Allergic/Immunologic: Negative for immunocompromised state.   Neurological: Positive for weakness. Negative for dizziness and headaches.    Psychiatric/Behavioral: Negative for confusion and sleep disturbance. The patient is nervous/anxious.      Objective:     Vital Signs (Most Recent):  Temp: 97.8 °F (36.6 °C) (09/17/18 0943)  Pulse: 100 (09/17/18 1230)  Resp: 18 (09/17/18 1130)  BP: (!) 101/54 (09/17/18 1230)  SpO2: 98 % (09/17/18 1230) Vital Signs (24h Range):  Temp:  [97.8 °F (36.6 °C)] 97.8 °F (36.6 °C)  Pulse:  [] 100  Resp:  [18] 18  SpO2:  [97 %-99 %] 98 %  BP: ()/(47-59) 101/54     Weight: 56 kg (123 lb 7.3 oz)  Body mass index is 21.34 kg/m².    No intake or output data in the 24 hours ending 09/17/18 1254    Physical Exam   Constitutional: She is oriented to person, place, and time. She appears well-developed. No distress.   Temporal and distal extremity muscle wasting   HENT:   Head: Normocephalic and atraumatic.   Eyes: Scleral icterus is present.   Cardiovascular: Normal rate, regular rhythm and normal heart sounds.   Pulmonary/Chest: Effort normal. No respiratory distress. She has decreased breath sounds in the right lower field and the left lower field. She has no rales.   Abdominal: Bowel sounds are normal. She exhibits distension (mild, soft) and ascites. There is no tenderness.   Musculoskeletal: Normal range of motion. She exhibits edema (trace).   Neurological: She is alert and oriented to person, place, and time.   Skin: Skin is warm and dry. She is not diaphoretic.   Psychiatric: She has a normal mood and affect. Her behavior is normal. Judgment and thought content normal.   Nursing note and vitals reviewed.      Laboratory:  CBC:   Recent Labs   Lab  09/17/18   1050   WBC  5.97   RBC  3.34*   HGB  10.7*   HCT  31.2*   PLT  79*   MCV  93   MCH  32.0*   MCHC  34.3     CMP:   Recent Labs   Lab  09/17/18   1050   GLU  315*   CALCIUM  9.4   ALBUMIN  3.7   PROT  6.5   NA  121*   K  3.0*   CO2  20*   CL  89*   BUN  74*   CREATININE  1.4   ALKPHOS  171*   ALT  43   AST  72*   BILITOT  2.5*     Coagulation:   Recent Labs  "  Lab  09/17/18   1050   INR  1.1     Labs within the past 24 hours have been reviewed.    Diagnostic Results:  pertinent imaging reviewed    Assessment/Plan:     * Hyponatremia    - Fluid restriction of 1L/day.   - low sodium diet  - serial Na levels to monitor closely.   - when corrected for hyperglycemia, sodium level 126. Monitor.   - will give albumin 25% x2 today  - will send UA, Ucx, urine sodium, urine osm        Liver transplant candidate    MELD-Na score: 24 at 9/17/2018 10:50 AM  MELD score: 14 at 9/17/2018 10:50 AM  Calculated from:  Serum Creatinine: 1.4 mg/dL at 9/17/2018 10:50 AM  Serum Sodium: 121 mmol/L (Rounded to 125 mmol/L) at 9/17/2018 10:50 AM  Total Bilirubin: 2.5 mg/dL at 9/17/2018 10:50 AM  INR(ratio): 1.1 at 9/17/2018 10:50 AM  Age: 68 years      - Listed for OLTX.   - Now admitted with hyponatremia.           Type 2 diabetes mellitus with diabetic polyneuropathy, with long-term current use of insulin    - patient on insulin per endo recs  - reorder home regimen with SSI  - BG elevated, likely contributing to hyponatremia  - consult placed to endocrinology for assistance          Hepatic encephalopathy    - cont with lactulose and rifaximin for HE control. Monitor.         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    - See liver transplant candidate.  - continue entecavir        Ascites    - patient with chronic ascites. On admit, abdomen distended, but soft.  - off of diuretics d/t hyponatremia, so has been requiring more frequent paracentesis (2x weekly)  - last para 9/14 with 6.8L removed,  with 22% segs, cultures NGTD  - plan for paracentesis tomorrow 9/18, will send cell counts and cultures.         Severe malnutrition    - Supplements ordered for nutrition.   - Dietary consult for calorie count, "prehab", poor appetite, and low sodium diet education            MELD-Na score: 24 at 9/17/2018 10:50 AM  MELD score: 14 at 9/17/2018 10:50 AM  Calculated from:  Serum " Creatinine: 1.4 mg/dL at 9/17/2018 10:50 AM  Serum Sodium: 121 mmol/L (Rounded to 125 mmol/L) at 9/17/2018 10:50 AM  Total Bilirubin: 2.5 mg/dL at 9/17/2018 10:50 AM  INR(ratio): 1.1 at 9/17/2018 10:50 AM  Age: 68 years    Discharge Planning: Admit to LTS, plan discussed with Dr. Worrell.     Joselin Montes PA-C  Liver Transplant  [facility]

## 2018-09-17 NOTE — ED NOTES
LOC: The patient is awake, alert and aware of environment with an appropriate affect, the patient is oriented x 3 and speaking appropriately.  APPEARANCE: Patient resting comfortably and in no acute distress, patient is clean and well groomed, patient's clothing is properly fastened.  SKIN: The skin is warm and dry, patient has normal skin turgor and moist mucus membranes, skin intact, no breakdown or brusing noted.  MUSKULOSKELETAL: Patient moving all extremities well, no obvious swelling or deformities noted.  RESPIRATORY: Airway is open and patent, respirations are spontaneous, patient has a normal effort and rate. Breath sounds are clear and equal bilaterally.  CARDIAC: Normal heart sounds. No peripheral edema.  ABDOMEN: Firm and tender to palpation, distention noted. Bowel sounds hypoactive.

## 2018-09-17 NOTE — ASSESSMENT & PLAN NOTE
- Fluid restriction of 1L/day.   - low sodium diet  - serial Na levels to monitor closely.   - when corrected for hyperglycemia, sodium level 126. Monitor.   - will give albumin 25% x2 today  - will send UA, Ucx, urine sodium, urine osm

## 2018-09-17 NOTE — CONSULTS
[facility]  Endocrinology  Diabetes Consult Note    Consult Requested by: Gigi Sprague MD   Reason for admit: Hyponatremia    HISTORY OF PRESENT ILLNESS:  Reason for Consult: Management of type 2 DM, Hyperglycemia     Diabetes diagnosis year:         Home Diabetes Medications:   Lantus 12 units SQ q hs, Novolog 12 units SQ with meals, and low correction scale Novolog starting at a BG of 180  Lab Results   Component Value Date    HGBA1C 6.8 (H) 2018          How often checking glucose at home? >4 x day   BG readings on regimen: 140's am fasting, 180-200's pre lunch and dinner  Hypoglycemia on the regimen?  NADINE- hypo awareness at high 80's  Missed doses on regimen?  No     Diabetes Complications include: neuropathy likely related to diabetes     Complicating diabetes co morbidities:   CIRRHOSIS        HPI:   Patient is a 68 y.o. female with a diagnosis of DM2, decompensated cirrhosis, hepatic encephalopathy, esophageal varices, and hyponatremia. She is listed for liver transplant, admitted for hyponatremia found on labs when patient presented to IR for paracentesis.  Report personal history of DM, positive family history of DM (mother).  Endocrine consulted for DM management.             Interval HPI:   Admitted to [facility]. BG elevated on admit.   Eatin%  Nausea: No  Hypoglycemia and intervention: No  Fever: No  TPN and/or TF: No    PMH, PSH, FH, SH  reviewed     Review of Systems   Constitutional: Negative for weight changes.  Eyes: Negative for visual disturbance.  Respiratory: Negative for cough.   Cardiovascular: Negative for chest pain.  Gastrointestinal: + for nausea.  Endocrine: Negative for polyuria, polydipsia.  Musculoskeletal: Negative for back pain.  Skin: Negative for rash.  Neurological: Negative for syncope.  Psychiatric/Behavioral: Negative for depression.      Current Medications and/or Treatments Impacting Glycemic Control  Immunotherapy:    Immunosuppressants      None        Steroids:   Hormones (From admission, onward)    None        Pressors:    Autonomic Drugs (From admission, onward)    Start     Stop Route Frequency Ordered    09/17/18 1400  midodrine tablet 15 mg      -- Oral Every 8 hours 09/17/18 1302        Hyperglycemia/Diabetes Medications:   Antihyperglycemics (From admission, onward)    Start     Stop Route Frequency Ordered    09/17/18 2100  insulin detemir U-100 pen 12 Units      -- SubQ Nightly 09/17/18 1234    09/17/18 1245  insulin aspart U-100 pen 12 Units      -- SubQ 3 times daily with meals 09/17/18 1244    09/17/18 1237  insulin aspart U-100 pen 0-5 Units      -- SubQ Before meals & nightly PRN 09/17/18 1137             PHYSICAL EXAMINATION:  Vitals:    09/17/18 1430   BP: 120/63   Pulse: 99   Resp: 18   Temp:      Body mass index is 21.34 kg/m².    Physical Exam   Constitutional:  Well developed, well nourished, NAD.  ENT: External ears no masses with nose patent; normal hearing.   Neck:  Supple; trachea midline.  Cardiovascular: Normal heart sounds, no LE edema.     Lungs:  Normal effort; lungs anterior bilaterally clear to auscultation.  Abdomen:  Soft, no masses,  no hernias. Abdomen distended.   MS: No clubbing or cyanosis of nails noted;  unable to assess gait.  Skin: No rashes, lesions, or ulcers; no nodules.  Psychiatric: Good judgement and insight; normal mood and affect.  Neurological: Cranial nerves are grossly intact.           Labs Reviewed and Include   Recent Labs   Lab  09/17/18   1050  09/17/18   1201   GLU  315*   --    CALCIUM  9.4   --    ALBUMIN  3.7   --    PROT  6.5   --    NA  121*  119*   K  3.0*   --    CO2  20*   --    CL  89*   --    BUN  74*   --    CREATININE  1.4   --    ALKPHOS  171*   --    ALT  43   --    AST  72*   --    BILITOT  2.5*   --      Lab Results   Component Value Date    WBC 5.97 09/17/2018    HGB 10.7 (L) 09/17/2018    HCT 31.2 (L) 09/17/2018    MCV 93 09/17/2018    PLT 79 (L) 09/17/2018     No results for  input(s): TSH, FREET4 in the last 168 hours.  Lab Results   Component Value Date    HGBA1C 6.8 (H) 08/16/2018       Nutritional status:   Body mass index is 21.34 kg/m².  Lab Results   Component Value Date    ALBUMIN 3.7 09/17/2018    ALBUMIN 3.8 09/17/2018    ALBUMIN 3.8 09/12/2018     Lab Results   Component Value Date    PREALBUMIN 12 (L) 09/17/2018       Estimated Creatinine Clearance: 32.5 mL/min (based on SCr of 1.4 mg/dL).    Accu-Checks  Recent Labs      09/17/18   1241  09/17/18   1650   POCTGLUCOSE  358*  370*        ASSESSMENT and PLAN    * Hyponatremia    Managed per primary.           Type 2 diabetes mellitus with diabetic polyneuropathy, with long-term current use of insulin    BG goal 140-180      Levemir 12 units HS.   Novolog 8-12 units with meals.   BG monitoring ac/hs and low dose correction scale.     Discharge plans- TBD         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    Managed per primary.  May impact insulin needs.               Plan discussed with patient, family, and RN at bedside.     Aviva Caldera NP  Endocrinology  [facility]

## 2018-09-17 NOTE — TELEPHONE ENCOUNTER
Spoke with Dr. Worrell.  Patient with Na of 122. Patient to be admitted and placed on internal hold.  Told him patient has para at 9:30.  He said she could go to ED after para.  Notified staff in international.  Dunia,  with patient notified. Family and patient made aware to go to ED after para.

## 2018-09-17 NOTE — TELEPHONE ENCOUNTER
Jennifer spoke with Dunia, the patient's  who is with patient and daughter. Instructed them as to para first then ED.  She was told patient c/o chest pain since last night. I instructed that patient should go straight to ED. Dr. Worrell will be informed.

## 2018-09-17 NOTE — SUBJECTIVE & OBJECTIVE
Past Medical History:   Diagnosis Date    Anemia requiring transfusions 8/16/2018    Ascites     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 8/15/2018    Cirrhosis     Cough 8/30/2018    Esophageal varices     Esophageal varices without bleeding 8/15/2018    Hepatic encephalopathy     Hepatitis B     Hepatitis D virus infection     Hypersplenism     Hyponatremia     Hypotension     Liver transplant candidate     Neutropenia     Portal hypertension     Severe malnutrition 8/16/2018    Type 2 diabetes mellitus, with long-term current use of insulin 8/15/2018       Past Surgical History:   Procedure Laterality Date    EGD (ESOPHAGOGASTRODUODENOSCOPY) N/A 8/17/2018    Performed by Travon Delgadillo MD at [facility] ([address] [place] [address])    ESOPHAGOGASTRODUODENOSCOPY N/A 8/17/2018    Procedure: EGD (ESOPHAGOGASTRODUODENOSCOPY);  Surgeon: Travon Delgadillo MD;  Location: [facility] ([address] [place] [address]);  Service: Endoscopy;  Laterality: N/A;       Review of patient's allergies indicates:  No Known Allergies    Family History     None        Tobacco Use    Smoking status: Never Smoker    Smokeless tobacco: Never Used   Substance and Sexual Activity    Alcohol use: No     Frequency: Never    Drug use: No    Sexual activity: Not on file         (Not in a hospital admission)    Review of Systems   Constitutional: Positive for appetite change. Negative for chills, fatigue and fever.   HENT: Negative for congestion and facial swelling.    Eyes: Negative for pain, discharge and visual disturbance.   Respiratory: Negative for cough, chest tightness and shortness of breath.    Cardiovascular: Negative for chest pain, palpitations and leg swelling.   Gastrointestinal: Positive for abdominal distention (mildly distended, soft) and nausea (chronic). Negative for abdominal pain, constipation, diarrhea and vomiting.   Endocrine: Negative.    Genitourinary: Negative for difficulty urinating, dysuria, frequency and urgency.    Musculoskeletal: Negative for back pain and neck pain.   Skin: Negative for color change, pallor, rash and wound.   Allergic/Immunologic: Negative for immunocompromised state.   Neurological: Positive for weakness. Negative for dizziness and headaches.   Psychiatric/Behavioral: Negative for confusion and sleep disturbance. The patient is nervous/anxious.      Objective:     Vital Signs (Most Recent):  Temp: 97.8 °F (36.6 °C) (09/17/18 0943)  Pulse: 100 (09/17/18 1230)  Resp: 18 (09/17/18 1130)  BP: (!) 101/54 (09/17/18 1230)  SpO2: 98 % (09/17/18 1230) Vital Signs (24h Range):  Temp:  [97.8 °F (36.6 °C)] 97.8 °F (36.6 °C)  Pulse:  [] 100  Resp:  [18] 18  SpO2:  [97 %-99 %] 98 %  BP: ()/(47-59) 101/54     Weight: 56 kg (123 lb 7.3 oz)  Body mass index is 21.34 kg/m².    No intake or output data in the 24 hours ending 09/17/18 1254    Physical Exam   Constitutional: She is oriented to person, place, and time. She appears well-developed. No distress.   Temporal and distal extremity muscle wasting   HENT:   Head: Normocephalic and atraumatic.   Eyes: Scleral icterus is present.   Cardiovascular: Normal rate, regular rhythm and normal heart sounds.   Pulmonary/Chest: Effort normal. No respiratory distress. She has decreased breath sounds in the right lower field and the left lower field. She has no rales.   Abdominal: Bowel sounds are normal. She exhibits distension (mild, soft) and ascites. There is no tenderness.   Musculoskeletal: Normal range of motion. She exhibits edema (trace).   Neurological: She is alert and oriented to person, place, and time.   Skin: Skin is warm and dry. She is not diaphoretic.   Psychiatric: She has a normal mood and affect. Her behavior is normal. Judgment and thought content normal.   Nursing note and vitals reviewed.      Laboratory:  CBC:   Recent Labs   Lab  09/17/18   1050   WBC  5.97   RBC  3.34*   HGB  10.7*   HCT  31.2*   PLT  79*   MCV  93   MCH  32.0*   MCHC  34.3      CMP:   Recent Labs   Lab  09/17/18   1050   GLU  315*   CALCIUM  9.4   ALBUMIN  3.7   PROT  6.5   NA  121*   K  3.0*   CO2  20*   CL  89*   BUN  74*   CREATININE  1.4   ALKPHOS  171*   ALT  43   AST  72*   BILITOT  2.5*     Coagulation:   Recent Labs   Lab  09/17/18   1050   INR  1.1     Labs within the past 24 hours have been reviewed.    Diagnostic Results:  pertinent imaging reviewed

## 2018-09-17 NOTE — SUBJECTIVE & OBJECTIVE
Interval HPI:   Admitted to [facility]. BG elevated on admit.   Eatin%  Nausea: No  Hypoglycemia and intervention: No  Fever: No  TPN and/or TF: No    PMH, PSH, FH, SH  reviewed     Review of Systems   Constitutional: Negative for weight changes.  Eyes: Negative for visual disturbance.  Respiratory: Negative for cough.   Cardiovascular: Negative for chest pain.  Gastrointestinal: + for nausea.  Endocrine: Negative for polyuria, polydipsia.  Musculoskeletal: Negative for back pain.  Skin: Negative for rash.  Neurological: Negative for syncope.  Psychiatric/Behavioral: Negative for depression.      Current Medications and/or Treatments Impacting Glycemic Control  Immunotherapy:    Immunosuppressants     None        Steroids:   Hormones (From admission, onward)    None        Pressors:    Autonomic Drugs (From admission, onward)    Start     Stop Route Frequency Ordered    18 1400  midodrine tablet 15 mg      -- Oral Every 8 hours 18 1302        Hyperglycemia/Diabetes Medications:   Antihyperglycemics (From admission, onward)    Start     Stop Route Frequency Ordered    18 2100  insulin detemir U-100 pen 12 Units      -- SubQ Nightly 18 1234    18 1245  insulin aspart U-100 pen 12 Units      -- SubQ 3 times daily with meals 18 1244    18 1237  insulin aspart U-100 pen 0-5 Units      -- SubQ Before meals & nightly PRN 18 1137             PHYSICAL EXAMINATION:  Vitals:    18 1430   BP: 120/63   Pulse: 99   Resp: 18   Temp:      Body mass index is 21.34 kg/m².    Physical Exam   Constitutional:  Well developed, well nourished, NAD.  ENT: External ears no masses with nose patent; normal hearing.   Neck:  Supple; trachea midline.  Cardiovascular: Normal heart sounds, no LE edema.     Lungs:  Normal effort; lungs anterior bilaterally clear to auscultation.  Abdomen:  Soft, no masses,  no hernias. Abdomen distended.   MS: No clubbing or cyanosis of nails noted;  unable to  assess gait.  Skin: No rashes, lesions, or ulcers; no nodules.  Psychiatric: Good judgement and insight; normal mood and affect.  Neurological: Cranial nerves are grossly intact.

## 2018-09-18 ENCOUNTER — TELEPHONE (OUTPATIENT)
Dept: TRANSPLANT | Facility: CLINIC | Age: 68
End: 2018-09-18

## 2018-09-18 LAB
ALBUMIN SERPL BCP-MCNC: 3.8 G/DL
ALBUMIN SERPL BCP-MCNC: 4.3 G/DL
ALBUMIN SERPL BCP-MCNC: 4.3 G/DL
ALBUMIN SERPL BCP-MCNC: 4.6 G/DL
ALBUMIN SERPL BCP-MCNC: 4.7 G/DL
ALP SERPL-CCNC: 149 U/L
ALT SERPL W/O P-5'-P-CCNC: 35 U/L
ANION GAP SERPL CALC-SCNC: 11 MMOL/L
ANION GAP SERPL CALC-SCNC: 13 MMOL/L
ANION GAP SERPL CALC-SCNC: 13 MMOL/L
APPEARANCE FLD: NORMAL
AST SERPL-CCNC: 66 U/L
BASOPHILS # BLD AUTO: 0.01 K/UL
BASOPHILS NFR BLD: 0.2 %
BILIRUB SERPL-MCNC: 2.7 MG/DL
BODY FLD TYPE: NORMAL
BUN SERPL-MCNC: 61 MG/DL
BUN SERPL-MCNC: 68 MG/DL
BUN SERPL-MCNC: 72 MG/DL
BUN SERPL-MCNC: 74 MG/DL
BUN SERPL-MCNC: 76 MG/DL
CALCIUM SERPL-MCNC: 9 MG/DL
CALCIUM SERPL-MCNC: 9.2 MG/DL
CALCIUM SERPL-MCNC: 9.2 MG/DL
CALCIUM SERPL-MCNC: 9.3 MG/DL
CALCIUM SERPL-MCNC: 9.6 MG/DL
CHLORIDE SERPL-SCNC: 89 MMOL/L
CHLORIDE SERPL-SCNC: 89 MMOL/L
CHLORIDE SERPL-SCNC: 90 MMOL/L
CHLORIDE SERPL-SCNC: 91 MMOL/L
CHLORIDE SERPL-SCNC: 91 MMOL/L
CK MB SERPL-MCNC: 1.3 NG/ML
CK MB SERPL-RTO: 6.2 %
CK SERPL-CCNC: 21 U/L
CO2 SERPL-SCNC: 17 MMOL/L
CO2 SERPL-SCNC: 18 MMOL/L
CO2 SERPL-SCNC: 21 MMOL/L
CO2 SERPL-SCNC: 22 MMOL/L
CO2 SERPL-SCNC: 23 MMOL/L
COLOR FLD: NORMAL
CREAT SERPL-MCNC: 1.1 MG/DL
CREAT SERPL-MCNC: 1.1 MG/DL
CREAT SERPL-MCNC: 1.2 MG/DL
CREAT SERPL-MCNC: 1.2 MG/DL
CREAT SERPL-MCNC: 1.3 MG/DL
DIFFERENTIAL METHOD: ABNORMAL
EOSINOPHIL # BLD AUTO: 0.1 K/UL
EOSINOPHIL NFR BLD: 0.9 %
ERYTHROCYTE [DISTWIDTH] IN BLOOD BY AUTOMATED COUNT: 16.5 %
EST. GFR  (AFRICAN AMERICAN): 48.4 ML/MIN/1.73 M^2
EST. GFR  (AFRICAN AMERICAN): 53.3 ML/MIN/1.73 M^2
EST. GFR  (AFRICAN AMERICAN): 53.3 ML/MIN/1.73 M^2
EST. GFR  (AFRICAN AMERICAN): 59.2 ML/MIN/1.73 M^2
EST. GFR  (AFRICAN AMERICAN): 59.2 ML/MIN/1.73 M^2
EST. GFR  (NON AFRICAN AMERICAN): 42 ML/MIN/1.73 M^2
EST. GFR  (NON AFRICAN AMERICAN): 46.2 ML/MIN/1.73 M^2
EST. GFR  (NON AFRICAN AMERICAN): 46.2 ML/MIN/1.73 M^2
EST. GFR  (NON AFRICAN AMERICAN): 51.3 ML/MIN/1.73 M^2
EST. GFR  (NON AFRICAN AMERICAN): 51.3 ML/MIN/1.73 M^2
GLUCOSE SERPL-MCNC: 197 MG/DL
GLUCOSE SERPL-MCNC: 294 MG/DL
GLUCOSE SERPL-MCNC: 299 MG/DL
GLUCOSE SERPL-MCNC: 322 MG/DL
GLUCOSE SERPL-MCNC: 496 MG/DL
HCT VFR BLD AUTO: 24.5 %
HGB BLD-MCNC: 8.4 G/DL
IMM GRANULOCYTES # BLD AUTO: 0.04 K/UL
IMM GRANULOCYTES NFR BLD AUTO: 0.8 %
INR PPP: 1.2
LYMPHOCYTES # BLD AUTO: 0.4 K/UL
LYMPHOCYTES NFR BLD: 8.3 %
LYMPHOCYTES NFR FLD MANUAL: 9 %
MAGNESIUM SERPL-MCNC: 2.3 MG/DL
MCH RBC QN AUTO: 31.6 PG
MCHC RBC AUTO-ENTMCNC: 34.3 G/DL
MCV RBC AUTO: 92 FL
MONOCYTES # BLD AUTO: 0.6 K/UL
MONOCYTES NFR BLD: 11.9 %
MONOS+MACROS NFR FLD MANUAL: 2 %
NEUTROPHILS # BLD AUTO: 4.1 K/UL
NEUTROPHILS NFR BLD: 77.9 %
NEUTROPHILS NFR FLD MANUAL: 89 %
NRBC BLD-RTO: 0 /100 WBC
PHOSPHATE SERPL-MCNC: 2.6 MG/DL
PHOSPHATE SERPL-MCNC: 2.8 MG/DL
PHOSPHATE SERPL-MCNC: 2.9 MG/DL
PHOSPHATE SERPL-MCNC: 3.5 MG/DL
PLATELET # BLD AUTO: 68 K/UL
PMV BLD AUTO: 11.1 FL
POCT GLUCOSE: 227 MG/DL (ref 70–110)
POCT GLUCOSE: 255 MG/DL (ref 70–110)
POCT GLUCOSE: 289 MG/DL (ref 70–110)
POCT GLUCOSE: 291 MG/DL (ref 70–110)
POCT GLUCOSE: 298 MG/DL (ref 70–110)
POCT GLUCOSE: 358 MG/DL (ref 70–110)
POCT GLUCOSE: 393 MG/DL (ref 70–110)
POCT GLUCOSE: 478 MG/DL (ref 70–110)
POCT GLUCOSE: 494 MG/DL (ref 70–110)
POCT GLUCOSE: 498 MG/DL (ref 70–110)
POTASSIUM SERPL-SCNC: 2.9 MMOL/L
POTASSIUM SERPL-SCNC: 3.5 MMOL/L
POTASSIUM SERPL-SCNC: 3.6 MMOL/L
PROT SERPL-MCNC: 6.2 G/DL
PROTHROMBIN TIME: 12.2 SEC
RBC # BLD AUTO: 2.66 M/UL
SODIUM SERPL-SCNC: 121 MMOL/L
SODIUM SERPL-SCNC: 121 MMOL/L
SODIUM SERPL-SCNC: 122 MMOL/L
SODIUM SERPL-SCNC: 123 MMOL/L
SODIUM SERPL-SCNC: 123 MMOL/L
SODIUM UR-SCNC: <20 MMOL/L
TROPONIN I SERPL DL<=0.01 NG/ML-MCNC: 0.01 NG/ML
WBC # BLD AUTO: 5.29 K/UL
WBC # FLD: 772 /CU MM

## 2018-09-18 PROCEDURE — 87075 CULTR BACTERIA EXCEPT BLOOD: CPT | Mod: NTX

## 2018-09-18 PROCEDURE — 82553 CREATINE MB FRACTION: CPT | Mod: NTX

## 2018-09-18 PROCEDURE — 93005 ELECTROCARDIOGRAM TRACING: CPT | Mod: NTX

## 2018-09-18 PROCEDURE — 63600175 PHARM REV CODE 636 W HCPCS: Mod: NTX | Performed by: PHYSICIAN ASSISTANT

## 2018-09-18 PROCEDURE — 87102 FUNGUS ISOLATION CULTURE: CPT | Mod: NTX

## 2018-09-18 PROCEDURE — 84300 ASSAY OF URINE SODIUM: CPT | Mod: NTX

## 2018-09-18 PROCEDURE — 63600175 PHARM REV CODE 636 W HCPCS: Mod: JG,NTX | Performed by: SURGERY

## 2018-09-18 PROCEDURE — 85025 COMPLETE CBC W/AUTO DIFF WBC: CPT | Mod: NTX

## 2018-09-18 PROCEDURE — 93010 ELECTROCARDIOGRAM REPORT: CPT | Mod: NTX,,, | Performed by: INTERNAL MEDICINE

## 2018-09-18 PROCEDURE — 97161 PT EVAL LOW COMPLEX 20 MIN: CPT | Mod: NTX

## 2018-09-18 PROCEDURE — 80069 RENAL FUNCTION PANEL: CPT | Mod: 91,NTX

## 2018-09-18 PROCEDURE — 97165 OT EVAL LOW COMPLEX 30 MIN: CPT | Mod: NTX

## 2018-09-18 PROCEDURE — 25000003 PHARM REV CODE 250: Mod: NTX | Performed by: NURSE PRACTITIONER

## 2018-09-18 PROCEDURE — 63600175 PHARM REV CODE 636 W HCPCS: Mod: NTX | Performed by: NURSE PRACTITIONER

## 2018-09-18 PROCEDURE — 85610 PROTHROMBIN TIME: CPT | Mod: NTX

## 2018-09-18 PROCEDURE — 89051 BODY FLUID CELL COUNT: CPT | Mod: NTX

## 2018-09-18 PROCEDURE — 63600175 PHARM REV CODE 636 W HCPCS: Mod: NTX | Performed by: INTERNAL MEDICINE

## 2018-09-18 PROCEDURE — 25000003 PHARM REV CODE 250: Mod: NTX | Performed by: PHYSICIAN ASSISTANT

## 2018-09-18 PROCEDURE — 20600001 HC STEP DOWN PRIVATE ROOM: Mod: NTX

## 2018-09-18 PROCEDURE — 82550 ASSAY OF CK (CPK): CPT | Mod: NTX

## 2018-09-18 PROCEDURE — 87205 SMEAR GRAM STAIN: CPT | Mod: NTX

## 2018-09-18 PROCEDURE — 80069 RENAL FUNCTION PANEL: CPT | Mod: NTX

## 2018-09-18 PROCEDURE — 36415 COLL VENOUS BLD VENIPUNCTURE: CPT | Mod: NTX

## 2018-09-18 PROCEDURE — P9047 ALBUMIN (HUMAN), 25%, 50ML: HCPCS | Mod: JG,NTX | Performed by: SURGERY

## 2018-09-18 PROCEDURE — P9047 ALBUMIN (HUMAN), 25%, 50ML: HCPCS | Mod: JG,NTX | Performed by: NURSE PRACTITIONER

## 2018-09-18 PROCEDURE — 87070 CULTURE OTHR SPECIMN AEROBIC: CPT | Mod: NTX

## 2018-09-18 PROCEDURE — 0W9G3ZZ DRAINAGE OF PERITONEAL CAVITY, PERCUTANEOUS APPROACH: ICD-10-PCS | Performed by: RADIOLOGY

## 2018-09-18 PROCEDURE — 84484 ASSAY OF TROPONIN QUANT: CPT | Mod: NTX

## 2018-09-18 PROCEDURE — 63600175 PHARM REV CODE 636 W HCPCS: Mod: JG,NTX | Performed by: NURSE PRACTITIONER

## 2018-09-18 PROCEDURE — 99233 SBSQ HOSP IP/OBS HIGH 50: CPT | Mod: NTX,,, | Performed by: INTERNAL MEDICINE

## 2018-09-18 PROCEDURE — 80053 COMPREHEN METABOLIC PANEL: CPT | Mod: NTX

## 2018-09-18 PROCEDURE — 83735 ASSAY OF MAGNESIUM: CPT | Mod: NTX

## 2018-09-18 PROCEDURE — 25000003 PHARM REV CODE 250: Mod: NTX | Performed by: INTERNAL MEDICINE

## 2018-09-18 RX ORDER — POTASSIUM CHLORIDE 20 MEQ/1
40 TABLET, EXTENDED RELEASE ORAL EVERY 4 HOURS
Status: DISCONTINUED | OUTPATIENT
Start: 2018-09-18 | End: 2018-09-18

## 2018-09-18 RX ORDER — SODIUM BICARBONATE 650 MG/1
1300 TABLET ORAL 3 TIMES DAILY
Status: DISCONTINUED | OUTPATIENT
Start: 2018-09-18 | End: 2018-09-21 | Stop reason: HOSPADM

## 2018-09-18 RX ORDER — INSULIN ASPART 100 [IU]/ML
11-15 INJECTION, SOLUTION INTRAVENOUS; SUBCUTANEOUS
Status: DISCONTINUED | OUTPATIENT
Start: 2018-09-19 | End: 2018-09-19

## 2018-09-18 RX ORDER — ALBUMIN HUMAN 250 G/1000ML
25 SOLUTION INTRAVENOUS ONCE
Status: COMPLETED | OUTPATIENT
Start: 2018-09-18 | End: 2018-09-18

## 2018-09-18 RX ORDER — INSULIN ASPART 100 [IU]/ML
1-10 INJECTION, SOLUTION INTRAVENOUS; SUBCUTANEOUS
Status: DISCONTINUED | OUTPATIENT
Start: 2018-09-18 | End: 2018-09-18

## 2018-09-18 RX ORDER — IBUPROFEN 200 MG
24 TABLET ORAL
Status: DISCONTINUED | OUTPATIENT
Start: 2018-09-18 | End: 2018-09-21 | Stop reason: HOSPADM

## 2018-09-18 RX ORDER — GLUCAGON 1 MG
1 KIT INJECTION
Status: DISCONTINUED | OUTPATIENT
Start: 2018-09-18 | End: 2018-09-21 | Stop reason: HOSPADM

## 2018-09-18 RX ORDER — CEFTRIAXONE 1 G/1
1 INJECTION, POWDER, FOR SOLUTION INTRAMUSCULAR; INTRAVENOUS
Status: DISCONTINUED | OUTPATIENT
Start: 2018-09-18 | End: 2018-09-21 | Stop reason: HOSPADM

## 2018-09-18 RX ORDER — ALBUMIN HUMAN 250 G/1000ML
12.5 SOLUTION INTRAVENOUS ONCE
Status: COMPLETED | OUTPATIENT
Start: 2018-09-18 | End: 2018-09-18

## 2018-09-18 RX ORDER — INSULIN ASPART 100 [IU]/ML
1-10 INJECTION, SOLUTION INTRAVENOUS; SUBCUTANEOUS
Status: DISCONTINUED | OUTPATIENT
Start: 2018-09-18 | End: 2018-09-21 | Stop reason: HOSPADM

## 2018-09-18 RX ORDER — IBUPROFEN 200 MG
16 TABLET ORAL
Status: DISCONTINUED | OUTPATIENT
Start: 2018-09-18 | End: 2018-09-21 | Stop reason: HOSPADM

## 2018-09-18 RX ORDER — TRAMADOL HYDROCHLORIDE 50 MG/1
50 TABLET ORAL EVERY 6 HOURS PRN
Status: DISCONTINUED | OUTPATIENT
Start: 2018-09-18 | End: 2018-09-21 | Stop reason: HOSPADM

## 2018-09-18 RX ADMIN — TRAMADOL HYDROCHLORIDE 50 MG: 50 TABLET, FILM COATED ORAL at 03:09

## 2018-09-18 RX ADMIN — LACTULOSE 20 G: 20 SOLUTION ORAL at 10:09

## 2018-09-18 RX ADMIN — INSULIN ASPART 3 UNITS: 100 INJECTION, SOLUTION INTRAVENOUS; SUBCUTANEOUS at 07:09

## 2018-09-18 RX ADMIN — SODIUM BICARBONATE 650 MG TABLET 1300 MG: at 02:09

## 2018-09-18 RX ADMIN — HEPARIN SODIUM 5000 UNITS: 5000 INJECTION, SOLUTION INTRAVENOUS; SUBCUTANEOUS at 10:09

## 2018-09-18 RX ADMIN — MIDODRINE HYDROCHLORIDE 15 MG: 5 TABLET ORAL at 02:09

## 2018-09-18 RX ADMIN — ALBUMIN (HUMAN) 12.5 G: 25 SOLUTION INTRAVENOUS at 09:09

## 2018-09-18 RX ADMIN — SODIUM CHLORIDE 2.5 UNITS/HR: 9 INJECTION, SOLUTION INTRAVENOUS at 12:09

## 2018-09-18 RX ADMIN — CEFTRIAXONE SODIUM 1 G: 1 INJECTION, POWDER, FOR SOLUTION INTRAMUSCULAR; INTRAVENOUS at 07:09

## 2018-09-18 RX ADMIN — SODIUM BICARBONATE 650 MG TABLET 1300 MG: at 09:09

## 2018-09-18 RX ADMIN — INSULIN DETEMIR 15 UNITS: 100 INJECTION, SOLUTION SUBCUTANEOUS at 06:09

## 2018-09-18 RX ADMIN — ALBUMIN (HUMAN) 25 G: 25 SOLUTION INTRAVENOUS at 09:09

## 2018-09-18 RX ADMIN — RIFAXIMIN 550 MG: 550 TABLET ORAL at 10:09

## 2018-09-18 RX ADMIN — ALBUMIN HUMAN 25 G: 0.25 SOLUTION INTRAVENOUS at 03:09

## 2018-09-18 RX ADMIN — ENTECAVIR 0.5 MG: 0.5 TABLET ORAL at 10:09

## 2018-09-18 RX ADMIN — SODIUM BICARBONATE 650 MG TABLET 1300 MG: at 10:09

## 2018-09-18 RX ADMIN — INSULIN ASPART 2 UNITS: 100 INJECTION, SOLUTION INTRAVENOUS; SUBCUTANEOUS at 08:09

## 2018-09-18 RX ADMIN — LACTULOSE 20 G: 20 SOLUTION ORAL at 09:09

## 2018-09-18 RX ADMIN — MIDODRINE HYDROCHLORIDE 15 MG: 5 TABLET ORAL at 09:09

## 2018-09-18 RX ADMIN — LACTULOSE 20 G: 20 SOLUTION ORAL at 02:09

## 2018-09-18 RX ADMIN — INSULIN ASPART 11 UNITS: 100 INJECTION, SOLUTION INTRAVENOUS; SUBCUTANEOUS at 08:09

## 2018-09-18 RX ADMIN — POTASSIUM BICARBONATE 50 MEQ: 25 TABLET, EFFERVESCENT ORAL at 09:09

## 2018-09-18 RX ADMIN — POTASSIUM CHLORIDE 40 MEQ: 1500 TABLET, EXTENDED RELEASE ORAL at 07:09

## 2018-09-18 RX ADMIN — ALBUMIN HUMAN 25 G: 0.25 SOLUTION INTRAVENOUS at 07:09

## 2018-09-18 RX ADMIN — MIDODRINE HYDROCHLORIDE 15 MG: 5 TABLET ORAL at 07:09

## 2018-09-18 RX ADMIN — RIFAXIMIN 550 MG: 550 TABLET ORAL at 09:09

## 2018-09-18 NOTE — HOSPITAL COURSE
Admitted due to hyponatremia.  Placed on fluid restriction, low sodium diet, with some improvement.  Blood sugars were uncontrolled, >400.  Endocrine consulted and initiated insulin gtt. Insulin gtt now d/c'd and subq insulin regimen adjusted. Sodium improving with controlling hyperglycemia. Potassium 3.3 today, will replace with k-lyte.   Paracentesis completed 9/18 with 5.1L removed, , Segs 89% suggestive of SBP.   Ceftriaxone initiated.  Will plan to repeat para tomorrow (9/21). Listed for liver transplant with MELD 24, however on hold due to SBP & hyponatremia.

## 2018-09-18 NOTE — PROGRESS NOTES
"[facility]  Endocrinology  Progress Note    Admit Date: 9/17/2018     Reason for Consult: Management of type 2 DM, Hyperglycemia     Diabetes diagnosis year: 2013        Home Diabetes Medications:   Lantus 12 units SQ q hs, Novolog 12 units SQ with meals, and low correction scale Novolog starting at a BG of 180  Lab Results   Component Value Date    HGBA1C 6.8 (H) 08/16/2018          How often checking glucose at home? >4 x day   BG readings on regimen: 140's am fasting, 180-200's pre lunch and dinner  Hypoglycemia on the regimen?  NADINE- hypo awareness at high 80's  Missed doses on regimen?  No     Diabetes Complications include: neuropathy likely related to diabetes     Complicating diabetes co morbidities:   CIRRHOSIS        HPI:   Patient is a 68 y.o. female with a diagnosis of DM2, decompensated cirrhosis, hepatic encephalopathy, esophageal varices, and hyponatremia. She is listed for liver transplant, admitted for hyponatremia found on labs when patient presented to IR for paracentesis.  Report personal history of DM, positive family history of DM (mother).  Endocrine consulted for DM management.             Interval HPI:   Overnight events:  BG significantly elevated post paracentesis.  Patient and family upset with plan of care (IV insulin, NPO or bariatric clears).   She is refusing medication citing she will not take her BP medications and Na supplement without food.    Endorses abdominal pain.   Spoke with daughter extensively with  (>30 minutes).    Eating:   tolerates full kocher diet, but currently bariatric clears/NPO for IIP  Nausea: No  Hypoglycemia and intervention: No  Fever: No  TPN and/or TF: No      BP (!) 92/59   Pulse 92   Temp 98.4 °F (36.9 °C) (Oral)   Resp 14   Ht 5' 3.78" (1.62 m)   Wt 56 kg (123 lb 7.3 oz)   LMP  (LMP Unknown)   SpO2 100%   Breastfeeding? No   BMI 21.34 kg/m²      Labs Reviewed and Include    Recent Labs   Lab  09/18/18   8072  " 09/18/18   1200   GLU  294*  299*  496*   CALCIUM  9.2  9.2  9.3   ALBUMIN  4.3  4.3  4.7   PROT  6.2   --    NA  121*  121*  123*   K  3.5  3.5  3.5   CO2  17*  18*  23   CL  91*  90*  89*   BUN  72*  74*  68*   CREATININE  1.1  1.2  1.2   ALKPHOS  149*   --    ALT  35   --    AST  66*   --    BILITOT  2.7*   --      Lab Results   Component Value Date    WBC 5.29 09/18/2018    HGB 8.4 (L) 09/18/2018    HCT 24.5 (L) 09/18/2018    MCV 92 09/18/2018    PLT 68 (L) 09/18/2018     No results for input(s): TSH, FREET4 in the last 168 hours.  Lab Results   Component Value Date    HGBA1C 6.8 (H) 08/16/2018       Nutritional status:   Body mass index is 21.34 kg/m².  Lab Results   Component Value Date    ALBUMIN 4.7 09/18/2018    ALBUMIN 4.3 09/18/2018    ALBUMIN 4.3 09/18/2018     Lab Results   Component Value Date    PREALBUMIN 12 (L) 09/17/2018       Estimated Creatinine Clearance: 37.9 mL/min (based on SCr of 1.2 mg/dL).    Accu-Checks  Recent Labs      09/17/18   1201  09/17/18   1241  09/17/18   1650  09/18/18   0730  09/18/18   1137  09/18/18   1149  09/18/18   1312  09/18/18   1411  09/18/18   1514  09/18/18   1614   POCTGLUCOSE  358*  358*  370*  289*  478*  498*  494*  393*  298*  291*       Current Medications and/or Treatments Impacting Glycemic Control  Immunotherapy:    Immunosuppressants     None        Steroids:   Hormones (From admission, onward)    None        Pressors:    Autonomic Drugs (From admission, onward)    Start     Stop Route Frequency Ordered    09/17/18 1400  midodrine tablet 15 mg      -- Oral Every 8 hours 09/17/18 1302        Hyperglycemia/Diabetes Medications:   Antihyperglycemics (From admission, onward)    Start     Stop Route Frequency Ordered    09/18/18 1300  insulin regular (Humulin R) 100 Units in sodium chloride 0.9% 100 mL infusion     Question:  Insulin Rate Adjustment (DO NOT MODIFY ANSWER)  Answer:  [email or web address]    -- IV Continuous 09/18/18 1149          ASSESSMENT and PLAN    * Hyponatremia    Secondary to liver disease.  Managed per primary.           Type 2 diabetes mellitus with diabetic polyneuropathy, with long-term current use of insulin    BG goal 140-180    Previous regimen inpatient:  Levemir 12 units HS.   Novolog 8-12 units with meals.     Currently on IIP at 1.5u/h POC q1h.  Recommend continue until BG controlled.  Will then increase Levemir to 15u qHS and increase novolog to 11-15u AC      Discharge plans- TBD         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    Managed per primary.  May impact insulin needs.               Katie Sanchez MD  Endocrinology  [facility] SANDOVAL, Nadine Rivera MD,  have personally taken the history and examined the patient and agree with the resident's note as stated above.

## 2018-09-18 NOTE — TELEPHONE ENCOUNTER
Contacted patient's daughter, Angela, with assist of Language line. Cindy was . #[phone]  Discussed with daughter:  Patient on insulin gtt and should not be fed due to blood sugar being so high.   It can take hours to titrate gtt to manage blood sugar and this is left to endocrine team to manage.   Daughter wanted orders changed. Emphasized No.  Patient's diabetes management will be done by Endocrine team.   Daughter anxiety very bad.  Encouraged daughter to decrease anxiety for her mother's sake.  She stated this was difficult due to mother being so ill and the language barrier.   Emphasized mother has been ill and needs support and encouragement.  Language barrier was known when coming to [facility].   has been provided for extended periods of time. Language line is used to communicate with the family when  not present.   At the end of the conversation, daughter stated Okay. Stated patient experiencing chest pain. This information given to Seble Tineo NP on TSU along with content of conversation.

## 2018-09-18 NOTE — TELEPHONE ENCOUNTER
[id number]    Lab Results   Component Value Date    CREATININE 1.1 09/18/2018    CREATININE 1.2 09/18/2018     (L) 09/18/2018     (L) 09/18/2018    BILITOT 2.7 (H) 09/18/2018    ALBUMIN 4.3 09/18/2018    ALBUMIN 4.3 09/18/2018    INR 1.2 09/18/2018     MELD 24  Encephalopathy: 1 - 2  Ascites: moderate  Dialysis: no     Recertification requestor: Charley Simms

## 2018-09-18 NOTE — ASSESSMENT & PLAN NOTE
- patient with chronic ascites. On admit, abdomen distended.  - off of diuretics d/t hyponatremia, so has been requiring more frequent paracentesis (2x weekly)  - last para 9/14 with 6.8L removed,  with 22% segs, cultures NGTD  - paracentesis 9/18, 5.1L removed, cell counts and cultures pending.

## 2018-09-18 NOTE — PLAN OF CARE
Problem: Patient Care Overview  Goal: Plan of Care Review  Outcome: Ongoing (interventions implemented as appropriate)  Pt AAOx4. Pt free from falls. Pt wears non slip socks when ambulating. Pt bed low and locked position. Pt afebrile. Pt IV site without redness or edema.  Pt has denied any pain or discomfort this shift. Pt given albumin. Q6 hrs.

## 2018-09-18 NOTE — ASSESSMENT & PLAN NOTE
MELD-Na score: 24 at 9/18/2018 12:00 PM  MELD score: 14 at 9/18/2018 12:00 PM  Calculated from:  Serum Creatinine: 1.2 mg/dL at 9/18/2018 12:00 PM  Serum Sodium: 123 mmol/L (Rounded to 125 mmol/L) at 9/18/2018 12:00 PM  Total Bilirubin: 2.7 mg/dL at 9/18/2018  4:57 AM  INR(ratio): 1.2 at 9/18/2018  4:57 AM  Age: 68 years    Listed with MELD 24, currently on hold due to hyponatremia

## 2018-09-18 NOTE — PT/OT/SLP EVAL
Physical Therapy Evaluation and Discharge Note    Patient Name:  Scarlett Reddy   MRN:  [id number]    Recommendations:     Discharge Recommendations:  home(return to [facility] with family assist)   Discharge Equipment Recommendations: none   Barriers to discharge: None    Assessment:     Scarlett Reddy is a 68 y.o. female admitted with a medical diagnosis of Hyponatremia. Pt performing functional mobility with supervision. Ambulated greater than household distance without AD with no SOB or LOB noted.  At this time, patient is functioning at their prior level of function and does not require further acute PT services.     Recent Surgery: * No surgery found *      Plan:     During this hospitalization, patient does not require further acute PT services.  Please re-consult if situation changes.      Subjective     Chief Complaint: none noted   Patient/Family Comments/goals: return home   Pain/Comfort:  · Pain Rating 1: 0/10    Patients cultural, spiritual, Presybeterian conflicts given the current situation: none noted     Living Environment:  Pt is international, currently staying locally at [facility] with her  and daughter. PTA pt was using rollator for mobility prn and was performing ADLs with assist prn.   Prior to admission, patients level of function was mod-I with assist prn.  Equipment used at home: rollator, shower chair.  Upon discharge, patient will have assistance from family.    Objective:     Communicated with RN prior to session.  Patient found seated EOB upon PT entry to room found with: telemetry, pulse ox (continuous)     General Precautions: Standard, fall   Orthopedic Precautions:N/A   Braces: N/A      Exams:  · Communication: speaks Danish;  present throughout session  · Postural Exam:  Patient presented with the following abnormalities:    · -       Rounded shoulders  · Sensation:    · -       Intact  · RLE ROM: WFL  · RLE Strength: WFL  · LLE ROM: WFL  · LLE Strength:  WFL    Functional Mobility:  · Transfers:     · Sit to Stand:  supervision with no AD  · Gait: ~200 ft. with supervision and no AD  · Decreased giuseppe, impaired weight-shifting ability     Therapeutic Activities and Exercises:   Pt and family educated on role of PT and PT POC, including plan to d/c IP PT services at this time. Pt and family instructed to contact medical team if therapy needs arise during hospital admission. Pt and family verbalized understanding.   Pt's daughter asked to bring pt's rollator to pt's room for use during hospital admission. Pt's daughter v/u.   Pt and family educated on the importance of OOB activity. Encouraged pt to sit UIC for majority of day as tolerated continue daily ambulation with family or RN assist. Pt and family v/u.     AM-PAC 6 CLICK MOBILITY  Total Score:23     Patient left seated EOB with all lines intact, call button in reach and pt's family and  present.    GOALS:   Multidisciplinary Problems     Physical Therapy Goals     Not on file          Multidisciplinary Problems (Resolved)        Problem: Physical Therapy Goal    Goal Priority Disciplines Outcome Goal Variances Interventions   Physical Therapy Goal   (Resolved)     PT, PT/OT Outcome(s) achieved                     History:     Past Medical History:   Diagnosis Date    Anemia requiring transfusions 8/16/2018    Ascites     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 8/15/2018    Cirrhosis     Cough 8/30/2018    Esophageal varices     Esophageal varices without bleeding 8/15/2018    Hepatic encephalopathy     Hepatitis B     Hepatitis D virus infection     Hypersplenism     Hyponatremia     Hypotension     Liver transplant candidate     Neutropenia     Portal hypertension     Severe malnutrition 8/16/2018    Type 2 diabetes mellitus, with long-term current use of insulin 8/15/2018       Past Surgical History:   Procedure Laterality Date    EGD (ESOPHAGOGASTRODUODENOSCOPY) N/A  8/17/2018    Performed by Travon Delgadillo [facility] at [facility] ENDO (2ND FLR)    ESOPHAGOGASTRODUODENOSCOPY N/A 8/17/2018    Procedure: EGD (ESOPHAGOGASTRODUODENOSCOPY);  Surgeon: Travon Delgadillo MD;  Location: [facility] (2ND FLR);  Service: Endoscopy;  Laterality: N/A;       Clinical Decision Making:     History  Co-morbidities and personal factors that may impact the plan of care Examination  Body Structures and Functions, activity limitations and participation restrictions that may impact the plan of care Clinical Presentation   Decision Making/ Complexity Score   Co-morbidities:   [] Time since onset of injury / illness / exacerbation  [x] Status of current condition  []Patient's cognitive status and safety concerns    [x] Multiple Medical Problems (see med hx)  Personal Factors:   [x] Patient's age  [] Prior Level of function   [] Patient's home situation (environment and family support)  [] Patient's level of motivation  [] Expected progression of patient      HISTORY:(criteria)    [] 64273 - no personal factors/history    [] 21326 - has 1-2 personal factor/comorbidity     [x] 81066 - has >3 personal factor/comorbidity     Body Regions:  [] Objective examination findings  [] Head     []  Neck  [] Trunk   [] Upper Extremity  [] Lower Extremity    Body Systems:  [] For communication ability, affect, cognition, language, and learning style: the assessment of the ability to make needs known, consciousness, orientation (person, place, and time), expected emotional /behavioral responses, and learning preferences (eg, learning barriers, education  needs)  [x] For the neuromuscular system: a general assessment of gross coordinated movement (eg, balance, gait, locomotion, transfers, and transitions) and motor function  (motor control and motor learning)  [x] For the musculoskeletal system: the assessment of gross symmetry, gross range of motion, gross strength, height, and weight  [] For the integumentary system: the assessment  of pliability(texture), presence of scar formation, skin color, and skin integrity  [] For cardiovascular/pulmonary system: the assessment of heart rate, respiratory rate, blood pressure, and edema     Activity limitations:    [] Patient's cognitive status and saf ety concerns          [] Status of current condition      [] Weight bearing restriction  [] Cardiopulmunary Restriction    Participation Restrictions:   [] Goals and goal agreement with the patient     [] Rehab potential (prognosis) and probable outcome      Examination of Body System: (criteria)    [x] 84234 - addressing 1-2 elements    [] 97600 - addressing a total of 3 or more elements     [] 91424 -  Addressing a total of 4 or more elements         Clinical Presentation: (criteria)  Stable - 08908     On examination of body system using standardized tests and measures patient presents with 1-2 elements from any of the following: body structures and functions, activity limitations, and/or participation restrictions.  Leading to a clinical presentation that is considered stable and/or uncomplicated                              Clinical Decision Making  (Eval Complexity):  Low- 87844     Time Tracking:     PT Received On: 09/18/18  PT Start Time: 1111     PT Stop Time: 1120  PT Total Time (min): 9 min     Billable Minutes: Evaluation 9   (co-eval with OT)    Radha Nguyen, PT, DPT   9/18/2018  [phone]

## 2018-09-18 NOTE — CONSULTS
Consulted received yesterday and completed.  This is a duplicate.    Please see Consult from Aviva Caldera NP (9/17/18) for consult note.

## 2018-09-18 NOTE — NURSING
Notified Dr. Sanchez, endocrinology, of pt's newest BG result (298) and new rate per algorithm (0.9 u/hr).  Verbal orders received to infuse insulin at 1.0 u/hr.  RN will do so, see MAR, and continue to monitor.

## 2018-09-18 NOTE — PT/OT/SLP EVAL
Occupational Therapy   Evaluation    Name: Scarlett Reddy  MRN: [id number]  Admitting Diagnosis:  Hyponatremia      Recommendations:     Discharge Recommendations: home(Return to [facility] with family assistance as needed)  Discharge Equipment Recommendations:  none  Barriers to discharge:  None    History:     Occupational Profile:  Living Environment: Pt lives at [facility] with family, Bathroom is handicapped accessible with shower chair ( however was not using shower chair prior to admit)   Previous level of function: PTA, pt reports being independent with functional mobility and requiring minimal assistance with LB dressing and supervision with bathing.   Roles and Routines: Mother, community dwelller, speaks Maltese  Equipment Used at Home:  rollator, shower chair  Assistance upon Discharge: Pt will have assistance from family following d/c     Past Medical History:   Diagnosis Date    Anemia requiring transfusions 8/16/2018    Ascites     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 8/15/2018    Cirrhosis     Cough 8/30/2018    Esophageal varices     Esophageal varices without bleeding 8/15/2018    Hepatic encephalopathy     Hepatitis B     Hepatitis D virus infection     Hypersplenism     Hyponatremia     Hypotension     Liver transplant candidate     Neutropenia     Portal hypertension     Severe malnutrition 8/16/2018    Type 2 diabetes mellitus, with long-term current use of insulin 8/15/2018       Past Surgical History:   Procedure Laterality Date    EGD (ESOPHAGOGASTRODUODENOSCOPY) N/A 8/17/2018    Performed by Travon Delgadillo MD at [facility] ([address] [place] [address])    ESOPHAGOGASTRODUODENOSCOPY N/A 8/17/2018    Procedure: EGD (ESOPHAGOGASTRODUODENOSCOPY);  Surgeon: Travon Delgadillo MD;  Location: [address]);  Service: Endoscopy;  Laterality: N/A;       Subjective     Chief Complaint: No complaints  Patient/Family Comments/goals: None stated    Pain/Comfort:  · Pain Rating 1:  0/10  · Pain Rating Post-Intervention 1: 0/10    Patients cultural, spiritual, Yazidism conflicts given the current situation:  None stated    Objective:     Communicated with: RN prior to session.  Patient found all lines intact, call button in reach and RN notified and telemetry upon OT entry to room.    General Precautions: Standard, fall   Orthopedic Precautions:N/A   Braces: N/A     Occupational Performance:    Bed Mobility:    · NT, pt found seated EOB upon arrival     Functional Mobility/Transfers:  · Patient completed Sit <> Stand Transfer with supervision  with  no assistive device   · Functional Mobility: Pt completed functional mobility in hallway requiring supervision with no AD. She tolerated well with no LOB or SOB.     Activities of Daily Living:  · Upper Body Dressing: stand by assistance to casper personal robe while seated EOB  · Lower Body Dressing: stand by assistance to casper slipper while seated EOB    Cognitive/Visual Perceptual:  Cognitive/Psychosocial Skills:     -       Follows Commands/attention:Follows multistep  commands  -       Communication: clear/fluent  -       Memory: No Deficits noted  -       Safety awareness/insight to disability: intact   -       Mood/Affect/Coping skills/emotional control: Appropriate to situation  Visual/Perceptual:      -Intact     Physical Exam:  Postural examination/scapula alignment:    -       Rounded shoulders  Skin integrity: Visible skin intact  Edema:  None noted  Sensation:    -       Intact   Motor Planning:    -       Intact  Dominant hand:    -       RUE  Upper Extremity Range of Motion:    WFL  Upper Extremity Strength:   WFL ( 4/5 BUE)   Strength:  5/5    AMPAC 6 Click ADL:  AMPAC Total Score: 21    Treatment & Education:  Education:    Patient left seated EOB with all lines intact, call button in reach, RN notified and  family/  present    Assessment:     Scarlett Reddy is a 68 y.o. female with a medical diagnosis of Hyponatremia.   "She presents with the following performance deficits affecting function: weakness, impaired endurance, impaired balance, impaired self care skills. Pt is near functional baseline however would benefit from one more treatment session to ensure same return to [facility] completing ADL. Following acute care OT, pt with no further therapy needed.     Rehab Prognosis: Good; patient would benefit from acute skilled OT services to address these deficits and reach maximum level of function.         Clinical Decision Makin.  OT Low:  "Pt evaluation falls under low complexity for evaluation coding due to performance deficits noted in 1-3 areas as stated above and 0 co-morbities affecting current functional status. Data obtained from problem focused assessments. No modifications or assistance was required for completion of evaluation. Only brief occupational profile and history review completed."     Plan:     Patient to be seen 2 x/week to address the above listed problems via self-care/home management, therapeutic activities, therapeutic exercises, neuromuscular re-education  · Plan of Care Expires: 10/17/18  · Plan of Care Reviewed with: patient, family()    This Plan of care has been discussed with the patient who was involved in its development and understands and is in agreement with the identified goals and treatment plan    GOALS:   Multidisciplinary Problems     Occupational Therapy Goals        Problem: Occupational Therapy Goal    Goal Priority Disciplines Outcome Interventions   Occupational Therapy Goal     OT, PT/OT Ongoing (interventions implemented as appropriate)    Description:  Goals to be met by:     Patient will increase functional independence with ADLs by performing:    UE Dressing with Supervision.  LE Dressing with Supervision.  Grooming while standing with Supervision.  Toileting from toilet with Supervision for hygiene and clothing management.   Supine to sit with " [place].  Toilet transfer to toilet with Supervision.                      Time Tracking:     OT Date of Treatment: 09/18/18  OT Start Time: 1109  OT Stop Time: 1120  OT Total Time (min): 11 min    Billable Minutes:Evaluation 11    Laura corado OT  9/18/2018

## 2018-09-18 NOTE — PROGRESS NOTES
Pt to ROC[facility] for paracentesis. Dr Soares and  to bedside. Yueh Catheter placed to LLQ with use of ultrasound guidance. No complaints or signs of distress. Will continue to monitor.

## 2018-09-18 NOTE — NURSING
RN notified Dr. Sanchez, endocrinology, of pt's latest BG result (291) and new rate per algorithm (1.2 u/hr).  Verbal orders received for to infuse insulin at 1.5 u/hr.  RN will do so, see MAR, and continue to monitor.

## 2018-09-18 NOTE — PLAN OF CARE
Problem: Physical Therapy Goal  Goal: Physical Therapy Goal  Outcome: Outcome(s) achieved Date Met: 09/18/18  PT evaluation complete. No goals established as pt is baseline with mobility and has no acute PT needs at this time. D/C from PT services.    Radha Nguyen, PT, DPT   9/18/2018  [phone]

## 2018-09-18 NOTE — PROGRESS NOTES
[facility]  Liver Transplant  Progress Note    Patient Name: Scarlett Reddy  MRN: [id number]  Admission Date: 9/17/2018  Hospital Length of Stay: 1 days  Code Status: Full Code  Primary Care Provider: Primary Doctor No    Subjective:     History of Present Illness:  Scarlett Reddy is a 68 yr old female with PMH of decompensated cirrhosis due to Hep B c/b ascites requiring twice weekly paracentesis, hepatic encephalopathy, esophageal varices, and hyponatremia. She is listed for liver transplant with MELD 23. She presented to IR for paracentesis today (9/17) however was noted to be hyponatremic on labs (Na 122) and was sent to ED. In the ED she reported not feeling well this weekend, c/o diffuse chest discomfort which she attributes to anxiety about labs and weekly testing and has since resolved, and general fatigue. She and her daughter express concerns over elevated glucose also. C/o chronic nausea, no vomiting. Last paracentesis was 9/14 and she had 6.8L removed, , 22% segs, cultures NGTD. She is scheduled for outpatient paracentesis tomorrow 9/18, will reschedule for inpatient. She reports multiple BM's daily with lactulose, although not as frequent over the weekend. Na decreased on outpatient labs (122) but likely related to hyperglycemia (274). Diuretics have remained on hold for hyponatremia. In addition, patient has been non-compliant with low Na and fluid restricted diet, which she and her family report is d/t her poor appetitite. She will be admitted for monitoring of her sodium and glucose, 1L fluid restricted low sodium diet, albumin administration, urine studies, endocrine consult, and nutrition consult. Will plan for paracentesis tomorrow.          Hospital Course:  Interval history: admitted due to hyponatremia.  Placed on fluid restriction with some improvement.  Blood sugars are uncontrolled, >400.  Endocrine consulted and initiated insulin gtt.  Paracentesis completed today with  5.1L removed, labs pending.  Listed for liver transplant with MELD 24.    Past Medical History:   Diagnosis Date    Anemia requiring transfusions 8/16/2018    Ascites     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 8/15/2018    Cirrhosis     Cough 8/30/2018    Esophageal varices     Esophageal varices without bleeding 8/15/2018    Hepatic encephalopathy     Hepatitis B     Hepatitis D virus infection     Hypersplenism     Hyponatremia     Hypotension     Liver transplant candidate     Neutropenia     Portal hypertension     Severe malnutrition 8/16/2018    Type 2 diabetes mellitus, with long-term current use of insulin 8/15/2018       Past Surgical History:   Procedure Laterality Date    EGD (ESOPHAGOGASTRODUODENOSCOPY) N/A 8/17/2018    Performed by Travon Delgadillo MD at [facility] ([facility])    ESOPHAGOGASTRODUODENOSCOPY N/A 8/17/2018    Procedure: EGD (ESOPHAGOGASTRODUODENOSCOPY);  Surgeon: Travon Delgadillo MD;  [facility]  Service: Endoscopy;  Laterality: N/A;       Review of patient's allergies indicates:  No Known Allergies    Family History     None        Tobacco Use    Smoking status: Never Smoker    Smokeless tobacco: Never Used   Substance and Sexual Activity    Alcohol use: No     Frequency: Never    Drug use: No    Sexual activity: Not on file       PTA Medications   Medication Sig    blood sugar diagnostic (ACCU-CHEK DC) Strp 1 strip by Misc.(Non-Drug; Combo Route) route 3 (three) times daily.    cholestyramine (QUESTRAN) 4 gram packet Take 1 packet (4 g total) by mouth 2 (two) times daily.    entecavir (BARACLUDE) 0.5 MG Tab Take 1 tablet (0.5 mg total) by mouth once daily.    ergocalciferol, vitamin D2, (VITAMIN D ORAL) Take by mouth.    insulin aspart U-100 (NOVOLOG FLEXPEN U-100 INSULIN) 100 unit/mL InPn pen Inject 12 Units into the skin 3 (three) times daily with meals. Plus correction scale, max TDD 60 units daily    insulin glargine  "(LANTUS) 100 unit/mL injection Inject 12 Units into the skin every evening.    lactulose (CHRONULAC) 10 gram/15 mL solution Take 30 mLs (20 g total) by mouth 3 (three) times daily. Adjust dose to have 4 bowel movements daily    lidocaine-prilocaine (EMLA) cream Apply topically as needed.    megestrol (MEGACE) 20 MG Tab Take 1 tablet (20 mg total) by mouth once daily.    midodrine (PROAMATINE) 5 MG Tab Take 3 tablets (15 mg total) by mouth every 8 (eight) hours.    OMEGA-3 FATTY ACIDS-EPA ORAL Take by mouth.    pen needle, diabetic (BD ULTRA-FINE CITLALI PEN NEEDLE) 32 gauge x 5/32" Ndle To use to inject insulin 4x daily    rifAXIMin (XIFAXAN) 550 mg Tab Take 1 tablet (550 mg total) by mouth 2 (two) times daily.    sodium bicarbonate 650 MG tablet Take 2 tablets (1,300 mg total) by mouth 2 (two) times daily.       Review of Systems   Constitutional: Positive for appetite change. Negative for chills, fatigue and fever.   HENT: Negative for congestion and facial swelling.    Eyes: Negative for pain, discharge and visual disturbance.   Respiratory: Negative for cough, chest tightness and shortness of breath.    Cardiovascular: Negative for chest pain, palpitations and leg swelling.   Gastrointestinal: Positive for abdominal distention (mildly distended, soft) and nausea (chronic). Negative for abdominal pain, constipation, diarrhea and vomiting.   Endocrine: Negative.    Genitourinary: Negative for difficulty urinating, dysuria, frequency and urgency.   Musculoskeletal: Negative for back pain and neck pain.   Skin: Negative for color change, pallor, rash and wound.   Allergic/Immunologic: Negative for immunocompromised state.   Neurological: Positive for weakness. Negative for dizziness and headaches.   Psychiatric/Behavioral: Negative for confusion and sleep disturbance. The patient is nervous/anxious.      Objective:     Vital Signs (Most Recent):  Temp: 98.4 °F (36.9 °C) (09/18/18 1204)  Pulse: 98 (09/18/18 " 1215)  Resp: 14 (09/18/18 1215)  BP: (!) 91/58 (09/18/18 1215)  SpO2: 100 % (09/18/18 1215) Vital Signs (24h Range):  Temp:  [97.7 °F (36.5 °C)-98.6 °F (37 °C)] 98.4 °F (36.9 °C)  Pulse:  [] 98  Resp:  [11-20] 14  SpO2:  [95 %-100 %] 100 %  BP: ()/(43-81) 91/58     Weight: 56 kg (123 lb 7.3 oz)  Body mass index is 21.34 kg/m².      Intake/Output Summary (Last 24 hours) at 9/18/2018 1302  Last data filed at 9/18/2018 0951  Gross per 24 hour   Intake --   Output 5100 ml   Net -5100 ml       Physical Exam   Constitutional: She is oriented to person, place, and time. She appears well-developed. No distress.   Temporal and distal extremity muscle wasting   HENT:   Head: Normocephalic and atraumatic.   Eyes: Scleral icterus is present.   Cardiovascular: Normal rate, regular rhythm and normal heart sounds.   Pulmonary/Chest: Effort normal. No respiratory distress. She has decreased breath sounds in the right lower field and the left lower field. She has no rales.   Abdominal: Bowel sounds are normal. She exhibits distension (mild, soft) and ascites. There is no tenderness.   Musculoskeletal: Normal range of motion. She exhibits edema (trace).   Neurological: She is alert and oriented to person, place, and time.   Skin: Skin is warm and dry. She is not diaphoretic.   Psychiatric: She has a normal mood and affect. Her behavior is normal. Judgment and thought content normal.   Nursing note and vitals reviewed.      Laboratory:  CBC:   Recent Labs   Lab  09/18/18   0457   WBC  5.29   RBC  2.66*   HGB  8.4*   HCT  24.5*   PLT  68*   MCV  92   MCH  31.6*   MCHC  34.3     CMP:   Recent Labs   Lab  09/18/18   0457  09/18/18   1200   GLU  294*  299*  496*   CALCIUM  9.2  9.2  9.3   ALBUMIN  4.3  4.3  4.7   PROT  6.2   --    NA  121*  121*  123*   K  3.5  3.5  3.5   CO2  17*  18*  23   CL  91*  90*  89*   BUN  72*  74*  68*   CREATININE  1.1  1.2  1.2   ALKPHOS  149*   --    ALT  35   --    AST  66*   --   "  BILITOT  2.7*   --      Coagulation:   Recent Labs   Lab  09/18/18   0457   INR  1.2     Labs within the past 24 hours have been reviewed.    Diagnostic Results:  pertinent imaging reviewed    Assessment/Plan:     * Hyponatremia    - Fluid restriction of 1L/day.   - low sodium diet  - serial Na levels to monitor closely.   - UA with 2+ glucose, Ucx ngtd, urine sodium <20, urine osm 546        Ascites    - patient with chronic ascites. On admit, abdomen distended.  - off of diuretics d/t hyponatremia, so has been requiring more frequent paracentesis (2x weekly)  - last para 9/14 with 6.8L removed,  with 22% segs, cultures NGTD  - paracentesis 9/18, 5.1L removed, cell counts and cultures pending.         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    - See liver transplant candidate.  - continue entecavir        Type 2 diabetes mellitus with diabetic polyneuropathy, with long-term current use of insulin    - patient on insulin per endo recs  - reorder home regimen with SSI  - BG elevated, likely contributing to hyponatremia  - consult placed to endocrinology appreciate assistance          Severe malnutrition    - Supplements ordered for nutrition.   - Dietary consult for calorie count, "prehab", poor appetite, and low sodium diet education          Liver transplant candidate    MELD-Na score: 24 at 9/18/2018 12:00 PM  MELD score: 14 at 9/18/2018 12:00 PM  Calculated from:  Serum Creatinine: 1.2 mg/dL at 9/18/2018 12:00 PM  Serum Sodium: 123 mmol/L (Rounded to 125 mmol/L) at 9/18/2018 12:00 PM  Total Bilirubin: 2.7 mg/dL at 9/18/2018  4:57 AM  INR(ratio): 1.2 at 9/18/2018  4:57 AM  Age: 68 years    Listed with MELD 24, currently on hold due to hyponatremia            Hepatic encephalopathy    - cont with lactulose and rifaximin for HE control. Monitor.             VTE Risk Mitigation (From admission, onward)        Ordered     heparin (porcine) injection 5,000 Units  Every 12 hours      09/17/18 1136 "     Place sequential compression device  Until discontinued      09/17/18 1136     IP VTE LOW RISK PATIENT  Once      09/17/18 1136          The patients clinical status was discussed at multidisplinary rounds, involving transplant surgery, transplant medicine, pharmacy, nursing, nutrition, and social work    Discharge Planning: not candidate at this time  No Patient Care Coordination Note on file.      Seble Tineo NP  Liver Transplant  [facility]

## 2018-09-18 NOTE — ASSESSMENT & PLAN NOTE
BG goal 140-180    Previous regimen inpatient:  Levemir 12 units HS.   Novolog 8-12 units with meals.     Currently on IIP at 1.5u/h POC q1h.  Recommend continue until BG controlled.  Will then increase Levemir to 15u qHS and increase novolog to 11-15u AC      Discharge plans- TBD

## 2018-09-18 NOTE — SUBJECTIVE & OBJECTIVE
Past Medical History:   Diagnosis Date    Anemia requiring transfusions 8/16/2018    Ascites     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 8/15/2018    Cirrhosis     Cough 8/30/2018    Esophageal varices     Esophageal varices without bleeding 8/15/2018    Hepatic encephalopathy     Hepatitis B     Hepatitis D virus infection     Hypersplenism     Hyponatremia     Hypotension     Liver transplant candidate     Neutropenia     Portal hypertension     Severe malnutrition 8/16/2018    Type 2 diabetes mellitus, with long-term current use of insulin 8/15/2018       Past Surgical History:   Procedure Laterality Date    EGD (ESOPHAGOGASTRODUODENOSCOPY) N/A 8/17/2018    Performed by Travon Delgadillo MD at [facility] ([facility]LR)    ESOPHAGOGASTRODUODENOSCOPY N/A 8/17/2018    Procedure: EGD (ESOPHAGOGASTRODUODENOSCOPY);  Surgeon: Travon Delgadillo MD;  Location: [facility] ([address] [place] [address]);  Service: Endoscopy;  Laterality: N/A;       Review of patient's allergies indicates:  No Known Allergies    Family History     None        Tobacco Use    Smoking status: Never Smoker    Smokeless tobacco: Never Used   Substance and Sexual Activity    Alcohol use: No     Frequency: Never    Drug use: No    Sexual activity: Not on file       PTA Medications   Medication Sig    blood sugar diagnostic (ACCU-CHEK DC) Strp 1 strip by Misc.(Non-Drug; Combo Route) route 3 (three) times daily.    cholestyramine (QUESTRAN) 4 gram packet Take 1 packet (4 g total) by mouth 2 (two) times daily.    entecavir (BARACLUDE) 0.5 MG Tab Take 1 tablet (0.5 mg total) by mouth once daily.    ergocalciferol, vitamin D2, (VITAMIN D ORAL) Take by mouth.    insulin aspart U-100 (NOVOLOG FLEXPEN U-100 INSULIN) 100 unit/mL InPn pen Inject 12 Units into the skin 3 (three) times daily with meals. Plus correction scale, max TDD 60 units daily    insulin glargine (LANTUS) 100 unit/mL injection Inject 12 Units into the skin every evening.     "lactulose (CHRONULAC) 10 gram/15 mL solution Take 30 mLs (20 g total) by mouth 3 (three) times daily. Adjust dose to have 4 bowel movements daily    lidocaine-prilocaine (EMLA) cream Apply topically as needed.    megestrol (MEGACE) 20 MG Tab Take 1 tablet (20 mg total) by mouth once daily.    midodrine (PROAMATINE) 5 MG Tab Take 3 tablets (15 mg total) by mouth every 8 (eight) hours.    OMEGA-3 FATTY ACIDS-EPA ORAL Take by mouth.    pen needle, diabetic (BD ULTRA-FINE CITLALI PEN NEEDLE) 32 gauge x 5/32" Ndle To use to inject insulin 4x daily    rifAXIMin (XIFAXAN) 550 mg Tab Take 1 tablet (550 mg total) by mouth 2 (two) times daily.    sodium bicarbonate 650 MG tablet Take 2 tablets (1,300 mg total) by mouth 2 (two) times daily.       Review of Systems   Constitutional: Positive for appetite change. Negative for chills, fatigue and fever.   HENT: Negative for congestion and facial swelling.    Eyes: Negative for pain, discharge and visual disturbance.   Respiratory: Negative for cough, chest tightness and shortness of breath.    Cardiovascular: Negative for chest pain, palpitations and leg swelling.   Gastrointestinal: Positive for abdominal distention (mildly distended, soft) and nausea (chronic). Negative for abdominal pain, constipation, diarrhea and vomiting.   Endocrine: Negative.    Genitourinary: Negative for difficulty urinating, dysuria, frequency and urgency.   Musculoskeletal: Negative for back pain and neck pain.   Skin: Negative for color change, pallor, rash and wound.   Allergic/Immunologic: Negative for immunocompromised state.   Neurological: Positive for weakness. Negative for dizziness and headaches.   Psychiatric/Behavioral: Negative for confusion and sleep disturbance. The patient is nervous/anxious.      Objective:     Vital Signs (Most Recent):  Temp: 98.4 °F (36.9 °C) (09/18/18 1204)  Pulse: 98 (09/18/18 1215)  Resp: 14 (09/18/18 1215)  BP: (!) 91/58 (09/18/18 1215)  SpO2: 100 % " (09/18/18 1215) Vital Signs (24h Range):  Temp:  [97.7 °F (36.5 °C)-98.6 °F (37 °C)] 98.4 °F (36.9 °C)  Pulse:  [] 98  Resp:  [11-20] 14  SpO2:  [95 %-100 %] 100 %  BP: ()/(43-81) 91/58     Weight: 56 kg (123 lb 7.3 oz)  Body mass index is 21.34 kg/m².      Intake/Output Summary (Last 24 hours) at 9/18/2018 1302  Last data filed at 9/18/2018 0951  Gross per 24 hour   Intake --   Output 5100 ml   Net -5100 ml       Physical Exam   Constitutional: She is oriented to person, place, and time. She appears well-developed. No distress.   Temporal and distal extremity muscle wasting   HENT:   Head: Normocephalic and atraumatic.   Eyes: Scleral icterus is present.   Cardiovascular: Normal rate, regular rhythm and normal heart sounds.   Pulmonary/Chest: Effort normal. No respiratory distress. She has decreased breath sounds in the right lower field and the left lower field. She has no rales.   Abdominal: Bowel sounds are normal. She exhibits distension (mild, soft) and ascites. There is no tenderness.   Musculoskeletal: Normal range of motion. She exhibits edema (trace).   Neurological: She is alert and oriented to person, place, and time.   Skin: Skin is warm and dry. She is not diaphoretic.   Psychiatric: She has a normal mood and affect. Her behavior is normal. Judgment and thought content normal.   Nursing note and vitals reviewed.      Laboratory:  CBC:   Recent Labs   Lab  09/18/18 0457   WBC  5.29   RBC  2.66*   HGB  8.4*   HCT  24.5*   PLT  68*   MCV  92   MCH  31.6*   MCHC  34.3     CMP:   Recent Labs   Lab  09/18/18   0457  09/18/18   1200   GLU  294*  299*  496*   CALCIUM  9.2  9.2  9.3   ALBUMIN  4.3  4.3  4.7   PROT  6.2   --    NA  121*  121*  123*   K  3.5  3.5  3.5   CO2  17*  18*  23   CL  91*  90*  89*   BUN  72*  74*  68*   CREATININE  1.1  1.2  1.2   ALKPHOS  149*   --    ALT  35   --    AST  66*   --    BILITOT  2.7*   --      Coagulation:   Recent Labs   Lab  09/18/18   0457   INR   1.2     Labs within the past 24 hours have been reviewed.    Diagnostic Results:  pertinent imaging reviewed

## 2018-09-18 NOTE — PLAN OF CARE
BG and insulin regimen reviewed.    BG goal 140-180.  Patient's BG significantly above goal.    , no correction overnight.      Recommend:  Continue Levemir 12u qHS  Continue Novolog 8-12u AC  Adjusted to moderate correction  POC AC/HS      Katie Sanchez MD  Endocrinology Fellow

## 2018-09-18 NOTE — ASSESSMENT & PLAN NOTE
- patient on insulin per endo recs  - reorder home regimen with SSI  - BG elevated, likely contributing to hyponatremia  - consult placed to endocrinology appreciate assistance

## 2018-09-18 NOTE — NURSING
RN checked pt's sugar prior to pt departure to IR for paracentesis.  Breakfast not available at this time, pt will not be able to eat breakfast in IR.  RN covered with sliding scale only and held scheduled mealtime insulin per charge RN advice.  RN will continue to monitor.

## 2018-09-18 NOTE — NURSING
Pt departing for IR at this time via stretcher, transported per hospital transport employee.  VSS, in NAD; pt denies pain at this time.

## 2018-09-18 NOTE — ASSESSMENT & PLAN NOTE
- Fluid restriction of 1L/day.   - low sodium diet  - serial Na levels to monitor closely.   - UA with 2+ glucose, Ucx ngtd, urine sodium <20, urine osm 546

## 2018-09-18 NOTE — SUBJECTIVE & OBJECTIVE
"Interval HPI:   Overnight events:  BG significantly elevated post paracentesis.  Patient and family upset with plan of care (IV insulin, NPO or bariatric clears).   She is refusing medication citing she will not take her BP medications and Na supplement without food.    Endorses abdominal pain.   Spoke with daughter extensively with  (>30 minutes).    Eating:   tolerates full kocher diet, but currently bariatric clears/NPO for IIP  Nausea: No  Hypoglycemia and intervention: No  Fever: No  TPN and/or TF: No      BP (!) 92/59   Pulse 92   Temp 98.4 °F (36.9 °C) (Oral)   Resp 14   Ht 5' 3.78" (1.62 m)   Wt 56 kg (123 lb 7.3 oz)   LMP  (LMP Unknown)   SpO2 100%   Breastfeeding? No   BMI 21.34 kg/m²     Labs Reviewed and Include    Recent Labs   Lab  09/18/18   0457  09/18/18   1200   GLU  294*  299*  496*   CALCIUM  9.2  9.2  9.3   ALBUMIN  4.3  4.3  4.7   PROT  6.2   --    NA  121*  121*  123*   K  3.5  3.5  3.5   CO2  17*  18*  23   CL  91*  90*  89*   BUN  72*  74*  68*   CREATININE  1.1  1.2  1.2   ALKPHOS  149*   --    ALT  35   --    AST  66*   --    BILITOT  2.7*   --      Lab Results   Component Value Date    WBC 5.29 09/18/2018    HGB 8.4 (L) 09/18/2018    HCT 24.5 (L) 09/18/2018    MCV 92 09/18/2018    PLT 68 (L) 09/18/2018     No results for input(s): TSH, FREET4 in the last 168 hours.  Lab Results   Component Value Date    HGBA1C 6.8 (H) 08/16/2018       Nutritional status:   Body mass index is 21.34 kg/m².  Lab Results   Component Value Date    ALBUMIN 4.7 09/18/2018    ALBUMIN 4.3 09/18/2018    ALBUMIN 4.3 09/18/2018     Lab Results   Component Value Date    PREALBUMIN 12 (L) 09/17/2018       Estimated Creatinine Clearance: 37.9 mL/min (based on SCr of 1.2 mg/dL).    Accu-Checks  Recent Labs      09/17/18   1201  09/17/18   1241  09/17/18   1650  09/18/18   0730  09/18/18   1137  09/18/18   1149  09/18/18   1312  09/18/18   1411  09/18/18   1514  09/18/18   1614   POCTGLUCOSE  " 358*  358*  370*  289*  478*  498*  494*  393*  298*  291*       Current Medications and/or Treatments Impacting Glycemic Control  Immunotherapy:    Immunosuppressants     None        Steroids:   Hormones (From admission, onward)    None        Pressors:    Autonomic Drugs (From admission, onward)    Start     Stop Route Frequency Ordered    09/17/18 1400  midodrine tablet 15 mg      -- Oral Every 8 hours 09/17/18 1302        Hyperglycemia/Diabetes Medications:   Antihyperglycemics (From admission, onward)    Start     Stop Route Frequency Ordered    09/18/18 1300  insulin regular (Humulin R) 100 Units in sodium chloride 0.9% 100 mL infusion     Question:  Insulin Rate Adjustment (DO NOT MODIFY ANSWER)  Answer:  [email or web address]    -- IV Continuous 09/18/18 1149

## 2018-09-18 NOTE — PLAN OF CARE
Problem: Patient Care Overview  Goal: Plan of Care Review  Pt AAOx4, VSS, in NAD throughout shift.  Pt's blood sugars elevated to max 498 today, RN administering insulin infusion per order and testing pt's blood sugar qhr.  Pt's blood sugars trending down.  Pt reported relief of hyperglycemia symptoms when blood sugar reached approximately 298 mg/dL at 1515.  Latest result is 255 mg/dL; orders received for transitioning pt off of gtt.  Pt and family verbalize understanding that pt cannot eat or drink while on insulin gtt, nothing but sugar-free clears.  Pt up to restroom with family assistance.  Pt c/o pain x1 this shift, see other note, RN treated with PRN tramadol x1, pt reporting relief.  Pt states she is hungry, RN continuing to remind pt and family of need for pt to only have sugar-free clear liquids.  Pt and family verbalize understanding.  RN maintaining fall risk precautions throughout shift.  Pt denies pain or other need at this time; RN will continue to monitor, assess, and alter plan of care as needed until report given to oncoming night shift nurse.

## 2018-09-18 NOTE — TREATMENT PLAN
Received call about BG [id number].  Patient had breakfast without prandial coverage due to timing of insulin (received sliding scale prior to tray arrival).     Plan to start IIP with POC q1h.  Discussed with nursing that patient should not eat while on IIP.  To discuss with patient as she is [place] and receives outside food.    Plan:  NPO (or bariatric clears)  IV intensive insulin protocol  POC q1h.    We will continue to follow.  Please call endocrinology with any questions.      Katie Sanchez MD  Endocrinology Fellow

## 2018-09-18 NOTE — PROGRESS NOTES
Admit Note     Met with patient, patient's , patient's daughter and International  Olga to assess patients needs due to patient's inability to communicate in English.  Patient is a 68 y.o.  female, admitted hyponatremia.     Patient admitted from the emergency department on 9/17/2018 .  At this time, patient presents as alert and oriented x 4, pleasant, good eye contact, well groomed, calm and cooperative.  At this time, patients caregiver presents as alert and oriented x 4, pleasant, good eye contact, well groomed, recall good, concentration/judgement good, average intelligence, calm and communicative.      Household/Family Systems (as reported by patients caregiver)     Patient resides with patient's  Andrés, at [address].  Patient,  and daughter are currently residing at the [facility] Room 674. Support system includes her  Andrés (primary language is Thai; however, does understand and speak Tajik) and daughter Angela.  Patient does not have dependents that are need of being cared for.     Patients primary caregiver is Andrés, patients , phone number [phone] and patient's daughter Angela, phone number [phone].  Confirmed patients contact information is [phone] (home);   Telephone Information:   Mobile [phone]   .    During admission, patient's caregiver plans to stay at the [facility].  Confirmed patient and patients caregivers do have access to reliable transportation.    Cognitive Status/Learning     Patients caregiver reports patients reading ability as college and states patient does have difficulty with weakness and does not speak/understand English.  Patients caregiver reports patient learns best by hands on information through assistance with her daughter.   Needed: Yes; primary language is  Tajik.  Patient's caregiver informed of  services available and  how to access services.   Highest education level: Associate/Bachelor Degree    Vocation/Disability (as reported by patients caregiver)    Working for Income: No  If no, reason not working: Patient Choice - Retired  Patient is a retired .    Adherence     Patients caregiver reports patient has a high level of adherence to patients health care regimen.  Adherence counseling and education provided.  Patient's caregiver verbalizes understanding.    Substance Use    Patients caregiver reports patients substance usage as the following:    Tobacco: none, patient denies any use.  Alcohol: none, patient denies any use.  Illicit Drugs/Non-prescribed Medications: none, patient denies any use.  Patients caregiver states clear understanding of the potential impact of substance use.  Substance abstinence/cessation counseling, education and resources provided and reviewed.     Services Utilizing/ADLS (as reported by patients caregiver)    Infusion Service: Prior to admission, patient utilizing? no  Home Health: Prior to admission, patient utilizing? no  DME: Prior to admission, yes; walker and w/c (borrowed from the [facility]  Pulmonary/Cardiac Rehab: Prior to admission, no  Dialysis:  Prior to admission, no  Transplant Specialty Pharmacy:  Prior to admission, no.    Prior to admission, patients caregiver reports patient was independent (at times due to weakness) with ADLS and was not driving.  Patients caregiver reports patient is not able to care for self at this time due to compromised medical condition (as documented in medical record) and physical weakness.  Patients caregiver reports patient indicates a willingness to care for self once medically cleared to do so.    Insurance/Medications    Insured by   Payor/Plan Subscr  Sex Relation Sub. Ins. ID Effective Group Num   1. [facility] IRENE* MERCEDES PATHAK 1950 Female  [id number] 8/3/18                                    [id number] [facility]  [id number]      Primary Insurance (for UNOS reporting): Private Insurance-[employer]  Secondary Insurance (for UNOS reporting): None    Patients caregiver reports patient is able to obtain and afford medications at this time and at time of discharge.    Living Will/Healthcare Power of     Patients caregiver reports patient does not have a LW and/or HCPA.   provided education regarding LW and HCPA and the completion of forms.    Coping/Mental Health (as reported by patients caregiver)    Patient is coping well with the aid of  her daughter and .  Patients caregiver is coping well with the aid of  her parents. As per Tx Coordinator Guillermina, patient's daughter is having anxiety issues which causes anxiousness for the patient.  SW discussed the importance of the patient's daughter and caregiver being healthy and in a calm state in order to provide effective and helpful care to the patient.  SW will assist in educating the pt's daughter and caregiver about self care with assistance from Tx Coordinator Guillermina.     Discharge Planning (as reported by patients caregiver)    At time of discharge, patient plans to return to [facility] under the care of her  and daughter. Patient's [facility].  Patient's other daughter Susan Ramirez will arrive on 9/27/18 until 10/18/18 to provide care for the patient.  A care plan past 10/18/18 has not been identified as of this date; however, the family will plan to do so near her departure time.  Patients  and daughter will transport patient.  Per rounds today, expected discharge date has not been medically determined at this time. Patients caretaker verbalizes understanding and is involved in treatment planning and discharge process.    Additional Concerns    Patient's caretaker denies additional needs and/or concerns at this time. Patient is being followed for needs, education, resources, information,  emotional support, supportive counseling, and for supportive and skilled discharge plan of care.  providing ongoing psychosocial support, education, resources and d/c planning as needed.  SW remains available.  provided resource list, patient choice, psychosocial and supportive counseling, resources, education, assistance and discharge planning with patient and caregiver involvement, ongoing SW availability and services as appropriate.  remains available. Patient's caregiver verbalizes understanding and agreement with information reviewed,  availability and how to access available resources as needed. Patient denies additional needs and/or concerns at this time. Patient verbalizes understanding and agreement with information reviewed, social work availability, and how to access available resources as needed.

## 2018-09-18 NOTE — NURSING
RN notified NP AGA Tineo of new pain to lower left anterior chest, 9/10.  New orders received.  RN will continue to monitor.

## 2018-09-19 ENCOUNTER — TELEPHONE (OUTPATIENT)
Dept: TRANSPLANT | Facility: CLINIC | Age: 68
End: 2018-09-19

## 2018-09-19 PROBLEM — K65.2 SPONTANEOUS BACTERIAL PERITONITIS: Status: ACTIVE | Noted: 2018-09-19

## 2018-09-19 LAB
ALBUMIN SERPL BCP-MCNC: 3.9 G/DL
ALBUMIN SERPL BCP-MCNC: 4.1 G/DL
ALBUMIN SERPL BCP-MCNC: 4.1 G/DL
ALBUMIN SERPL BCP-MCNC: 4.3 G/DL
ANION GAP SERPL CALC-SCNC: 10 MMOL/L
ANION GAP SERPL CALC-SCNC: 11 MMOL/L
BACTERIA SPEC ANAEROBE CULT: NORMAL
BASOPHILS # BLD AUTO: 0.03 K/UL
BASOPHILS NFR BLD: 0.8 %
BILIRUB SERPL-MCNC: 2.1 MG/DL
BUN SERPL-MCNC: 63 MG/DL
BUN SERPL-MCNC: 68 MG/DL
BUN SERPL-MCNC: 71 MG/DL
BUN SERPL-MCNC: 74 MG/DL
CALCIUM SERPL-MCNC: 8.8 MG/DL
CALCIUM SERPL-MCNC: 9.1 MG/DL
CALCIUM SERPL-MCNC: 9.5 MG/DL
CALCIUM SERPL-MCNC: 9.7 MG/DL
CHLORIDE SERPL-SCNC: 90 MMOL/L
CHLORIDE SERPL-SCNC: 91 MMOL/L
CHLORIDE SERPL-SCNC: 92 MMOL/L
CHLORIDE SERPL-SCNC: 92 MMOL/L
CO2 SERPL-SCNC: 22 MMOL/L
CO2 SERPL-SCNC: 22 MMOL/L
CO2 SERPL-SCNC: 23 MMOL/L
CO2 SERPL-SCNC: 24 MMOL/L
CREAT SERPL-MCNC: 1 MG/DL
CREAT SERPL-MCNC: 1.1 MG/DL
CREAT SERPL-MCNC: 1.2 MG/DL
CREAT SERPL-MCNC: 1.3 MG/DL
DIFFERENTIAL METHOD: ABNORMAL
EOSINOPHIL # BLD AUTO: 0.1 K/UL
EOSINOPHIL NFR BLD: 1.5 %
ERYTHROCYTE [DISTWIDTH] IN BLOOD BY AUTOMATED COUNT: 16.5 %
EST. GFR  (AFRICAN AMERICAN): 48.4 ML/MIN/1.73 M^2
EST. GFR  (AFRICAN AMERICAN): 53.3 ML/MIN/1.73 M^2
EST. GFR  (AFRICAN AMERICAN): 59.2 ML/MIN/1.73 M^2
EST. GFR  (AFRICAN AMERICAN): >60 ML/MIN/1.73 M^2
EST. GFR  (NON AFRICAN AMERICAN): 42 ML/MIN/1.73 M^2
EST. GFR  (NON AFRICAN AMERICAN): 46.2 ML/MIN/1.73 M^2
EST. GFR  (NON AFRICAN AMERICAN): 51.3 ML/MIN/1.73 M^2
EST. GFR  (NON AFRICAN AMERICAN): 57.6 ML/MIN/1.73 M^2
GLUCOSE SERPL-MCNC: 123 MG/DL
GLUCOSE SERPL-MCNC: 160 MG/DL
GLUCOSE SERPL-MCNC: 198 MG/DL
GLUCOSE SERPL-MCNC: 231 MG/DL
HCT VFR BLD AUTO: 24.4 %
HGB BLD-MCNC: 8.6 G/DL
IMM GRANULOCYTES # BLD AUTO: 0.01 K/UL
IMM GRANULOCYTES NFR BLD AUTO: 0.3 %
INR PPP: 1.3
LYMPHOCYTES # BLD AUTO: 0.3 K/UL
LYMPHOCYTES NFR BLD: 8.7 %
MAGNESIUM SERPL-MCNC: 2.1 MG/DL
MCH RBC QN AUTO: 32.5 PG
MCHC RBC AUTO-ENTMCNC: 35.2 G/DL
MCV RBC AUTO: 92 FL
MONOCYTES # BLD AUTO: 0.5 K/UL
MONOCYTES NFR BLD: 12.3 %
NEUTROPHILS # BLD AUTO: 3 K/UL
NEUTROPHILS NFR BLD: 76.4 %
NRBC BLD-RTO: 0 /100 WBC
PHOSPHATE SERPL-MCNC: 2.6 MG/DL
PHOSPHATE SERPL-MCNC: 2.8 MG/DL
PHOSPHATE SERPL-MCNC: 2.8 MG/DL
PHOSPHATE SERPL-MCNC: 3.1 MG/DL
PLATELET # BLD AUTO: 60 K/UL
PMV BLD AUTO: 11.1 FL
POCT GLUCOSE: 133 MG/DL (ref 70–110)
POCT GLUCOSE: 168 MG/DL (ref 70–110)
POCT GLUCOSE: 221 MG/DL (ref 70–110)
POCT GLUCOSE: 254 MG/DL (ref 70–110)
POTASSIUM SERPL-SCNC: 3.5 MMOL/L
POTASSIUM SERPL-SCNC: 3.5 MMOL/L
POTASSIUM SERPL-SCNC: 3.9 MMOL/L
POTASSIUM SERPL-SCNC: 3.9 MMOL/L
PROTHROMBIN TIME: 12.8 SEC
RBC # BLD AUTO: 2.65 M/UL
SODIUM SERPL-SCNC: 124 MMOL/L
SODIUM SERPL-SCNC: 125 MMOL/L
WBC # BLD AUTO: 3.91 K/UL

## 2018-09-19 PROCEDURE — 63600175 PHARM REV CODE 636 W HCPCS: Mod: NTX | Performed by: PHYSICIAN ASSISTANT

## 2018-09-19 PROCEDURE — 99233 SBSQ HOSP IP/OBS HIGH 50: CPT | Mod: NTX,,, | Performed by: NURSE PRACTITIONER

## 2018-09-19 PROCEDURE — 25000003 PHARM REV CODE 250: Mod: NTX | Performed by: PHYSICIAN ASSISTANT

## 2018-09-19 PROCEDURE — 63600175 PHARM REV CODE 636 W HCPCS: Mod: NTX | Performed by: NURSE PRACTITIONER

## 2018-09-19 PROCEDURE — 80069 RENAL FUNCTION PANEL: CPT | Mod: NTX

## 2018-09-19 PROCEDURE — 85610 PROTHROMBIN TIME: CPT | Mod: NTX

## 2018-09-19 PROCEDURE — 85025 COMPLETE CBC W/AUTO DIFF WBC: CPT | Mod: NTX

## 2018-09-19 PROCEDURE — 82247 BILIRUBIN TOTAL: CPT | Mod: NTX

## 2018-09-19 PROCEDURE — 83735 ASSAY OF MAGNESIUM: CPT | Mod: NTX

## 2018-09-19 PROCEDURE — 63600175 PHARM REV CODE 636 W HCPCS: Mod: NTX | Performed by: INTERNAL MEDICINE

## 2018-09-19 PROCEDURE — 25000003 PHARM REV CODE 250: Mod: NTX | Performed by: NURSE PRACTITIONER

## 2018-09-19 PROCEDURE — 80069 RENAL FUNCTION PANEL: CPT | Mod: 91,NTX

## 2018-09-19 PROCEDURE — 36415 COLL VENOUS BLD VENIPUNCTURE: CPT | Mod: NTX

## 2018-09-19 PROCEDURE — 20600001 HC STEP DOWN PRIVATE ROOM: Mod: NTX

## 2018-09-19 RX ORDER — LACTULOSE 10 G/15ML
20 SOLUTION ORAL 4 TIMES DAILY
Status: DISCONTINUED | OUTPATIENT
Start: 2018-09-19 | End: 2018-09-21 | Stop reason: HOSPADM

## 2018-09-19 RX ORDER — INSULIN ASPART 100 [IU]/ML
13-17 INJECTION, SOLUTION INTRAVENOUS; SUBCUTANEOUS
Status: DISCONTINUED | OUTPATIENT
Start: 2018-09-19 | End: 2018-09-19

## 2018-09-19 RX ORDER — INSULIN ASPART 100 [IU]/ML
15-19 INJECTION, SOLUTION INTRAVENOUS; SUBCUTANEOUS
Status: DISCONTINUED | OUTPATIENT
Start: 2018-09-19 | End: 2018-09-20

## 2018-09-19 RX ORDER — ALBUMIN HUMAN 250 G/1000ML
50 SOLUTION INTRAVENOUS ONCE
Status: COMPLETED | OUTPATIENT
Start: 2018-09-20 | End: 2018-09-20

## 2018-09-19 RX ADMIN — SODIUM BICARBONATE 650 MG TABLET 1300 MG: at 02:09

## 2018-09-19 RX ADMIN — INSULIN ASPART 2 UNITS: 100 INJECTION, SOLUTION INTRAVENOUS; SUBCUTANEOUS at 08:09

## 2018-09-19 RX ADMIN — LACTULOSE 20 G: 20 SOLUTION ORAL at 09:09

## 2018-09-19 RX ADMIN — MIDODRINE HYDROCHLORIDE 15 MG: 5 TABLET ORAL at 02:09

## 2018-09-19 RX ADMIN — INSULIN ASPART 11 UNITS: 100 INJECTION, SOLUTION INTRAVENOUS; SUBCUTANEOUS at 08:09

## 2018-09-19 RX ADMIN — LACTULOSE 20 G: 20 SOLUTION ORAL at 08:09

## 2018-09-19 RX ADMIN — RIFAXIMIN 550 MG: 550 TABLET ORAL at 08:09

## 2018-09-19 RX ADMIN — INSULIN ASPART 15 UNITS: 100 INJECTION, SOLUTION INTRAVENOUS; SUBCUTANEOUS at 05:09

## 2018-09-19 RX ADMIN — RIFAXIMIN 550 MG: 550 TABLET ORAL at 09:09

## 2018-09-19 RX ADMIN — INSULIN DETEMIR 15 UNITS: 100 INJECTION, SOLUTION SUBCUTANEOUS at 09:09

## 2018-09-19 RX ADMIN — CEFTRIAXONE SODIUM 1 G: 1 INJECTION, POWDER, FOR SOLUTION INTRAMUSCULAR; INTRAVENOUS at 09:09

## 2018-09-19 RX ADMIN — INSULIN ASPART 11 UNITS: 100 INJECTION, SOLUTION INTRAVENOUS; SUBCUTANEOUS at 12:09

## 2018-09-19 RX ADMIN — INSULIN ASPART 6 UNITS: 100 INJECTION, SOLUTION INTRAVENOUS; SUBCUTANEOUS at 12:09

## 2018-09-19 RX ADMIN — SODIUM BICARBONATE 650 MG TABLET 1300 MG: at 09:09

## 2018-09-19 RX ADMIN — MIDODRINE HYDROCHLORIDE 15 MG: 5 TABLET ORAL at 09:09

## 2018-09-19 RX ADMIN — LACTULOSE 20 G: 20 SOLUTION ORAL at 02:09

## 2018-09-19 RX ADMIN — MIDODRINE HYDROCHLORIDE 15 MG: 5 TABLET ORAL at 06:09

## 2018-09-19 RX ADMIN — INSULIN ASPART 2 UNITS: 100 INJECTION, SOLUTION INTRAVENOUS; SUBCUTANEOUS at 01:09

## 2018-09-19 RX ADMIN — HEPARIN SODIUM 5000 UNITS: 5000 INJECTION, SOLUTION INTRAVENOUS; SUBCUTANEOUS at 08:09

## 2018-09-19 RX ADMIN — HEPARIN SODIUM 5000 UNITS: 5000 INJECTION, SOLUTION INTRAVENOUS; SUBCUTANEOUS at 09:09

## 2018-09-19 RX ADMIN — ENTECAVIR 0.5 MG: 0.5 TABLET ORAL at 08:09

## 2018-09-19 RX ADMIN — INSULIN ASPART 2 UNITS: 100 INJECTION, SOLUTION INTRAVENOUS; SUBCUTANEOUS at 10:09

## 2018-09-19 RX ADMIN — SODIUM BICARBONATE 650 MG TABLET 1300 MG: at 08:09

## 2018-09-19 NOTE — ASSESSMENT & PLAN NOTE
- Fluid restriction of 1L/day.   - low sodium diet  - serial Na levels to monitor closely.   - UA with 2+ glucose, Ucx ngtd, urine sodium <20, urine osm 546  - sodium improved

## 2018-09-19 NOTE — PROGRESS NOTES
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 She is listed for liver transplant with MELD 23. She presented to IR for paracentesis today (9/17) however was noted to be hyponatremic on labs (Na 122) and was sent to ED. In the ED she reported not feeling well this weekend, c/o diffuse chest discomfort which she attributes to anxiety about labs and weekly testing and has since resolved, and general fatigue. She and her daughter express concerns over elevated glucose also. C/o chronic nausea, no vomiting. Last paracentesis was 9/14 and she had 6.8L removed, , 22% segs, cultures NGTD. She is scheduled for outpatient paracentesis tomorrow 9/18, will reschedule for inpatient. She reports multiple BM's daily with lactulose, although not as frequent over the weekend. Na decreased on outpatient labs (122) but likely related to hyperglycemia (274). Diuretics have remained on hold for hyponatremia. In addition, patient has been non-compliant with low Na and fluid restricted diet, which she and her family report is d/t her poor appetitite. She will be admitted for monitoring of her sodium and glucose, 1L fluid restricted low sodium diet, albumin administration, urine studies, endocrine consult, and nutrition consult. Will plan for paracentesis tomorrow.          Hospital Course:  Admitted due to hyponatremia.  Placed on fluid restriction with some improvement.  Blood sugars were uncontrolled, >400.  Endocrine consulted and initiated insulin gtt. Sodium improved with controlling  hyperglycemia.  Paracentesis completed 9/18 with 5.1L removed, , Segs 89% suggestive of SBP.   Ceftriaxone initiated.  Will plan to repeat para on Friday. Listed for liver transplant with MELD 24, however on hold due to SBP & hyponatremia.    Past Medical History:   Diagnosis Date    Anemia requiring transfusions 8/16/2018    Ascites     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 8/15/2018    Cirrhosis     Cough 8/30/2018    Esophageal varices     Esophageal varices without bleeding 8/15/2018    Hepatic encephalopathy     Hepatitis B     Hepatitis D virus infection     Hypersplenism     Hyponatremia     Hypotension     Liver transplant candidate     Neutropenia     Portal hypertension     Severe malnutrition 8/16/2018    Type 2 diabetes mellitus, with long-term current use of insulin 8/15/2018       Past Surgical History:   Procedure Laterality Date    EGD (ESOPHAGOGASTRODUODENOSCOPY) N/A 8/17/2018    Performed by Travon Delgadillo MD at [facility] ([address] [place] [address])    ESOPHAGOGASTRODUODENOSCOPY N/A 8/17/2018    Procedure: EGD (ESOPHAGOGASTRODUODENOSCOPY);  Surgeon: Travon Delgadillo MD;  Location: [facility] ([address] [place] [address]);  Service: Endoscopy;  Laterality: N/A;       Review of patient's allergies indicates:  No Known Allergies    Family History     None        Tobacco Use    Smoking status: Never Smoker    Smokeless tobacco: Never Used   Substance and Sexual Activity    Alcohol use: No     Frequency: Never    Drug use: No    Sexual activity: Not on file       PTA Medications   Medication Sig    blood sugar diagnostic (ACCU-CHEK DC) Strp 1 strip by Misc.(Non-Drug; Combo Route) route 3 (three) times daily.    cholestyramine (QUESTRAN) 4 gram packet Take 1 packet (4 g total) by mouth 2 (two) times daily.    entecavir (BARACLUDE) 0.5 MG Tab Take 1 tablet (0.5 mg total) by mouth once daily.    ergocalciferol, vitamin D2, (VITAMIN D ORAL) Take by mouth.    insulin aspart U-100 (NOVOLOG  "FLEXPEN U-100 INSULIN) 100 unit/mL InPn pen Inject 12 Units into the skin 3 (three) times daily with meals. Plus correction scale, max TDD 60 units daily    insulin glargine (LANTUS) 100 unit/mL injection Inject 12 Units into the skin every evening.    lactulose (CHRONULAC) 10 gram/15 mL solution Take 30 mLs (20 g total) by mouth 3 (three) times daily. Adjust dose to have 4 bowel movements daily    lidocaine-prilocaine (EMLA) cream Apply topically as needed.    megestrol (MEGACE) 20 MG Tab Take 1 tablet (20 mg total) by mouth once daily.    midodrine (PROAMATINE) 5 MG Tab Take 3 tablets (15 mg total) by mouth every 8 (eight) hours.    OMEGA-3 FATTY ACIDS-EPA ORAL Take by mouth.    pen needle, diabetic (BD ULTRA-FINE CITLALI PEN NEEDLE) 32 gauge x 5/32" Ndle To use to inject insulin 4x daily    rifAXIMin (XIFAXAN) 550 mg Tab Take 1 tablet (550 mg total) by mouth 2 (two) times daily.    sodium bicarbonate 650 MG tablet Take 2 tablets (1,300 mg total) by mouth 2 (two) times daily.       Review of Systems   Constitutional: Positive for appetite change. Negative for chills, fatigue and fever.   HENT: Negative for congestion and facial swelling.    Eyes: Negative for pain, discharge and visual disturbance.   Respiratory: Negative for cough, chest tightness and shortness of breath.    Cardiovascular: Negative for chest pain, palpitations and leg swelling.   Gastrointestinal: Positive for abdominal distention (improved post para) and nausea (chronic). Negative for abdominal pain, constipation, diarrhea and vomiting.   Endocrine: Negative.    Genitourinary: Negative for difficulty urinating, dysuria, frequency and urgency.   Musculoskeletal: Negative for back pain and neck pain.   Skin: Negative for color change, pallor, rash and wound.   Allergic/Immunologic: Negative for immunocompromised state.   Neurological: Positive for weakness. Negative for dizziness and headaches.   Psychiatric/Behavioral: Negative for " confusion and sleep disturbance. The patient is nervous/anxious.      Objective:     Vital Signs (Most Recent):  Temp: 97.9 °F (36.6 °C) (09/19/18 1127)  Pulse: 98 (09/19/18 1117)  Resp: 17 (09/19/18 0845)  BP: 113/68 (09/19/18 0845)  SpO2: 97 % (09/19/18 0845) Vital Signs (24h Range):  Temp:  [97 °F (36.1 °C)-98.2 °F (36.8 °C)] 97.9 °F (36.6 °C)  Pulse:  [80-98] 98  Resp:  [13-18] 17  SpO2:  [97 %-100 %] 97 %  BP: ()/(58-68) 113/68     Weight: 56 kg (123 lb 7.3 oz)  Body mass index is 21.34 kg/m².      Intake/Output Summary (Last 24 hours) at 9/19/2018 1308  Last data filed at 9/19/2018 1200  Gross per 24 hour   Intake 1006.02 ml   Output --   Net 1006.02 ml       Physical Exam   Constitutional: She is oriented to person, place, and time. She appears well-developed. No distress.   Temporal and distal extremity muscle wasting   HENT:   Head: Normocephalic and atraumatic.   Eyes: Scleral icterus is present.   Cardiovascular: Normal rate, regular rhythm and normal heart sounds.   Pulmonary/Chest: Effort normal. No respiratory distress. She has decreased breath sounds in the right lower field and the left lower field. She has no rales.   Abdominal: Bowel sounds are normal. She exhibits distension and ascites. There is no tenderness.   Musculoskeletal: Normal range of motion. She exhibits edema (trace).   Neurological: She is alert and oriented to person, place, and time.   Skin: Skin is warm and dry. She is not diaphoretic.   Psychiatric: She has a normal mood and affect. Her behavior is normal. Judgment and thought content normal.   Nursing note and vitals reviewed.      Laboratory:  CBC:   Recent Labs   Lab  09/19/18   0512   WBC  3.91   RBC  2.65*   HGB  8.6*   HCT  24.4*   PLT  60*   MCV  92   MCH  32.5*   MCHC  35.2     CMP:   Recent Labs   Lab  09/18/18   0457   09/19/18   0512  09/19/18   1140   GLU  294*  299*   < >  160*  231*   CALCIUM  9.2  9.2   < >  9.1  9.5   ALBUMIN  4.3  4.3   < >  4.1  4.1  "  PROT  6.2   --    --    --    NA  121*  121*   < >  125*  125*   K  3.5  3.5   < >  3.5  3.9   CO2  17*  18*   < >  22*  24   CL  91*  90*   < >  92*  91*   BUN  72*  74*   < >  63*  71*   CREATININE  1.1  1.2   < >  1.0  1.3   ALKPHOS  149*   --    --    --    ALT  35   --    --    --    AST  66*   --    --    --    BILITOT  2.7*   --   2.1*   --     < > = values in this interval not displayed.     Coagulation:   Recent Labs   Lab  09/19/18   0512   INR  1.3*     Labs within the past 24 hours have been reviewed.    Diagnostic Results:  pertinent imaging reviewed    Assessment/Plan:     * Spontaneous bacterial peritonitis    - paracentesis 9/18  - , 89% segs, 9 lymphs  - started ceftriaxone 9/18  - albumin 50gm 9/18, albumin 50gm 9/20  - repeat para Friday 9/21           Hyponatremia    - Fluid restriction of 1L/day.   - low sodium diet  - serial Na levels to monitor closely.   - UA with 2+ glucose, Ucx ngtd, urine sodium <20, urine osm 546  - sodium improved        Ascites    - patient with chronic ascites. On admit, abdomen distended.  - off of diuretics d/t hyponatremia, so has been requiring more frequent paracentesis (2x weekly)  - last para 9/14 with 6.8L removed,  with 22% segs, cultures NGTD  - paracentesis 9/18, 5.1L removed, cell counts with SBP        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    - See liver transplant candidate.  - continue entecavir        Type 2 diabetes mellitus with diabetic polyneuropathy, with long-term current use of insulin    - patient on insulin per endo recs  - reorder home regimen with SSI  - BG elevated, likely contributing to hyponatremia  - consult placed to endocrinology appreciate assistance          Severe malnutrition    - Supplements ordered for nutrition.   - Dietary consult for calorie count, "prehab", poor appetite, and low sodium diet education          Liver transplant candidate    MELD-Na score: 25 at 9/19/2018 11:40 AM  MELD " score: 15 at 9/19/2018 11:40 AM  Calculated from:  Serum Creatinine: 1.3 mg/dL at 9/19/2018 11:40 AM  Serum Sodium: 125 mmol/L at 9/19/2018 11:40 AM  Total Bilirubin: 2.1 mg/dL at 9/19/2018  5:12 AM  INR(ratio): 1.3 at 9/19/2018  5:12 AM  Age: 68 years    Listed with MELD 24, currently on hold due to hyponatremia & sbp            Hepatic encephalopathy    - cont with lactulose and rifaximin for HE control. Monitor.             VTE Risk Mitigation (From admission, onward)        Ordered     heparin (porcine) injection 5,000 Units  Every 12 hours      09/17/18 1136     Place sequential compression device  Until discontinued      09/17/18 1136     IP VTE LOW RISK PATIENT  Once      09/17/18 1136          The patients clinical status was discussed at multidisplinary rounds, involving transplant surgery, transplant medicine, pharmacy, nursing, nutrition, and social work    Discharge Planning: not candidate at this time  No Patient Care Coordination Note on file.      Seble Tineo NP  Liver Transplant  [facility]

## 2018-09-19 NOTE — CONSULTS
Food & Nutrition  Education    Diet Education: Low Sodium   Time Spent: 15 min  Learners: Pt     Diet: Low Sodium, 2gm, 1L Fluid Restriction      Nutrition Education provided with handouts: Low Sodium Nutrition Therapy (Nutrition Care Manual)     Comments:  Consult received frail while awaiting liver transplant. low sodium, fluid restricted Pt last seen by Dietitian 9/11/18. Pt with Malnutrition NFPE completed 8/16/18. Provided and explained handout detailing tips for cutting back sodium. Presented meal planning and label reading tips. All questions and concerns answered.      Follow-Up: 1x weekly     Please re-consult as needed.    Thanks!

## 2018-09-19 NOTE — ASSESSMENT & PLAN NOTE
MELD-Na score: 25 at 9/19/2018 11:40 AM  MELD score: 15 at 9/19/2018 11:40 AM  Calculated from:  Serum Creatinine: 1.3 mg/dL at 9/19/2018 11:40 AM  Serum Sodium: 125 mmol/L at 9/19/2018 11:40 AM  Total Bilirubin: 2.1 mg/dL at 9/19/2018  5:12 AM  INR(ratio): 1.3 at 9/19/2018  5:12 AM  Age: 68 years    Listed with MELD 24, currently on hold due to hyponatremia & sbp

## 2018-09-19 NOTE — NURSING
Endocrine NP(s) remain unavailable despite multiple attempts to reach them per this RN.  RN notified endocrine MD, Dr. Sanchez, of insulin pt did and did not receive, that pt ate upon return to unit without RN rechecking sugar, and that pt is now symptomatic of hyperglycemia with results 478-496 mg/dL.  New orders received for insulin gtt and qhr accuchecks.  RN will implement as ordered and continue to monitor.

## 2018-09-19 NOTE — PLAN OF CARE
Problem: Patient Care Overview  Goal: Plan of Care Review  Outcome: Ongoing (interventions implemented as appropriate)  AAOx4. VS stable, afebrile. Denies pain/discomfort. Accuchecks continued achs & 2am. Meal time insulin and SSI given as needed. Meal time insulin increased to 15-19 units depending on food intake.Telemetry monitor on and pt in NSR. R FA 22g, CDI. No redness, no swelling. Up independent and ambulating in room. Bed low and locked, call bell within reach, side rails x2. In NAD. Will continue to monitor.

## 2018-09-19 NOTE — ASSESSMENT & PLAN NOTE
- patient with chronic ascites. On admit, abdomen distended.  - off of diuretics d/t hyponatremia, so has been requiring more frequent paracentesis (2x weekly)  - last para 9/14 with 6.8L removed,  with 22% segs, cultures NGTD  - paracentesis 9/18, 5.1L removed, cell counts with SBP

## 2018-09-19 NOTE — SUBJECTIVE & OBJECTIVE
Past Medical History:   Diagnosis Date    Anemia requiring transfusions 8/16/2018    Ascites     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 8/15/2018    Cirrhosis     Cough 8/30/2018    Esophageal varices     Esophageal varices without bleeding 8/15/2018    Hepatic encephalopathy     Hepatitis B     Hepatitis D virus infection     Hypersplenism     Hyponatremia     Hypotension     Liver transplant candidate     Neutropenia     Portal hypertension     Severe malnutrition 8/16/2018    Type 2 diabetes mellitus, with long-term current use of insulin 8/15/2018       Past Surgical History:   Procedure Laterality Date    EGD (ESOPHAGOGASTRODUODENOSCOPY) N/A 8/17/2018    Performed by Travon Delgadillo MD at [facility] (2ND FLR)    ESOPHAGOGASTRODUODENOSCOPY N/A 8/17/2018    Procedure: EGD (ESOPHAGOGASTRODUODENOSCOPY);  Surgeon: Travon Delgadillo MD;  Location: [facility] ([address] [place] [address]);  Service: Endoscopy;  Laterality: N/A;       Review of patient's allergies indicates:  No Known Allergies    Family History     None        Tobacco Use    Smoking status: Never Smoker    Smokeless tobacco: Never Used   Substance and Sexual Activity    Alcohol use: No     Frequency: Never    Drug use: No    Sexual activity: Not on file       PTA Medications   Medication Sig    blood sugar diagnostic (ACCU-CHEK DC) Strp 1 strip by Misc.(Non-Drug; Combo Route) route 3 (three) times daily.    cholestyramine (QUESTRAN) 4 gram packet Take 1 packet (4 g total) by mouth 2 (two) times daily.    entecavir (BARACLUDE) 0.5 MG Tab Take 1 tablet (0.5 mg total) by mouth once daily.    ergocalciferol, vitamin D2, (VITAMIN D ORAL) Take by mouth.    insulin aspart U-100 (NOVOLOG FLEXPEN U-100 INSULIN) 100 unit/mL InPn pen Inject 12 Units into the skin 3 (three) times daily with meals. Plus correction scale, max TDD 60 units daily    insulin glargine (LANTUS) 100 unit/mL injection Inject 12 Units into the skin every evening.     "lactulose (CHRONULAC) 10 gram/15 mL solution Take 30 mLs (20 g total) by mouth 3 (three) times daily. Adjust dose to have 4 bowel movements daily    lidocaine-prilocaine (EMLA) cream Apply topically as needed.    megestrol (MEGACE) 20 MG Tab Take 1 tablet (20 mg total) by mouth once daily.    midodrine (PROAMATINE) 5 MG Tab Take 3 tablets (15 mg total) by mouth every 8 (eight) hours.    OMEGA-3 FATTY ACIDS-EPA ORAL Take by mouth.    pen needle, diabetic (BD ULTRA-FINE CITLALI PEN NEEDLE) 32 gauge x 5/32" Ndle To use to inject insulin 4x daily    rifAXIMin (XIFAXAN) 550 mg Tab Take 1 tablet (550 mg total) by mouth 2 (two) times daily.    sodium bicarbonate 650 MG tablet Take 2 tablets (1,300 mg total) by mouth 2 (two) times daily.       Review of Systems   Constitutional: Positive for appetite change. Negative for chills, fatigue and fever.   HENT: Negative for congestion and facial swelling.    Eyes: Negative for pain, discharge and visual disturbance.   Respiratory: Negative for cough, chest tightness and shortness of breath.    Cardiovascular: Negative for chest pain, palpitations and leg swelling.   Gastrointestinal: Positive for abdominal distention (improved post para) and nausea (chronic). Negative for abdominal pain, constipation, diarrhea and vomiting.   Endocrine: Negative.    Genitourinary: Negative for difficulty urinating, dysuria, frequency and urgency.   Musculoskeletal: Negative for back pain and neck pain.   Skin: Negative for color change, pallor, rash and wound.   Allergic/Immunologic: Negative for immunocompromised state.   Neurological: Positive for weakness. Negative for dizziness and headaches.   Psychiatric/Behavioral: Negative for confusion and sleep disturbance. The patient is nervous/anxious.      Objective:     Vital Signs (Most Recent):  Temp: 97.9 °F (36.6 °C) (09/19/18 1127)  Pulse: 98 (09/19/18 1117)  Resp: 17 (09/19/18 0845)  BP: 113/68 (09/19/18 0845)  SpO2: 97 % (09/19/18 0845) " Vital Signs (24h Range):  Temp:  [97 °F (36.1 °C)-98.2 °F (36.8 °C)] 97.9 °F (36.6 °C)  Pulse:  [80-98] 98  Resp:  [13-18] 17  SpO2:  [97 %-100 %] 97 %  BP: ()/(58-68) 113/68     Weight: 56 kg (123 lb 7.3 oz)  Body mass index is 21.34 kg/m².      Intake/Output Summary (Last 24 hours) at 9/19/2018 1308  Last data filed at 9/19/2018 1200  Gross per 24 hour   Intake 1006.02 ml   Output --   Net 1006.02 ml       Physical Exam   Constitutional: She is oriented to person, place, and time. She appears well-developed. No distress.   Temporal and distal extremity muscle wasting   HENT:   Head: Normocephalic and atraumatic.   Eyes: Scleral icterus is present.   Cardiovascular: Normal rate, regular rhythm and normal heart sounds.   Pulmonary/Chest: Effort normal. No respiratory distress. She has decreased breath sounds in the right lower field and the left lower field. She has no rales.   Abdominal: Bowel sounds are normal. She exhibits distension and ascites. There is no tenderness.   Musculoskeletal: Normal range of motion. She exhibits edema (trace).   Neurological: She is alert and oriented to person, place, and time.   Skin: Skin is warm and dry. She is not diaphoretic.   Psychiatric: She has a normal mood and affect. Her behavior is normal. Judgment and thought content normal.   Nursing note and vitals reviewed.      Laboratory:  CBC:   Recent Labs   Lab  09/19/18   0512   WBC  3.91   RBC  2.65*   HGB  8.6*   HCT  24.4*   PLT  60*   MCV  92   MCH  32.5*   MCHC  35.2     CMP:   Recent Labs   Lab  09/18/18   0457   09/19/18   0512  09/19/18   1140   GLU  294*  299*   < >  160*  231*   CALCIUM  9.2  9.2   < >  9.1  9.5   ALBUMIN  4.3  4.3   < >  4.1  4.1   PROT  6.2   --    --    --    NA  121*  121*   < >  125*  125*   K  3.5  3.5   < >  3.5  3.9   CO2  17*  18*   < >  22*  24   CL  91*  90*   < >  92*  91*   BUN  72*  74*   < >  63*  71*   CREATININE  1.1  1.2   < >  1.0  1.3   ALKPHOS  149*   --    --     --    ALT  35   --    --    --    AST  66*   --    --    --    BILITOT  2.7*   --   2.1*   --     < > = values in this interval not displayed.     Coagulation:   Recent Labs   Lab  09/19/18   0512   INR  1.3*     Labs within the past 24 hours have been reviewed.    Diagnostic Results:  pertinent imaging reviewed

## 2018-09-19 NOTE — ASSESSMENT & PLAN NOTE
- paracentesis 9/18  - , 89% segs, 9 lymphs  - started ceftriaxone 9/18  - albumin 50gm 9/18, albumin 50gm 9/20  - repeat para Friday 9/21

## 2018-09-19 NOTE — TELEPHONE ENCOUNTER
PATIENT NAME: Scarlett Welchmocurtis  [facility] #: [id number]    Lab Results   Component Value Date    CREATININE 1.0 09/19/2018     (L) 09/19/2018    BILITOT 2.7 (H) 09/18/2018    ALBUMIN 4.1 09/19/2018    INR 1.3 (H) 09/19/2018     MELD 24  Encephalopathy: 1 - 2  Ascites: moderate  Dialysis: no     Recertification requestor: Charley Simms

## 2018-09-19 NOTE — NURSING
Pt returned to unit from IR.  Pt currently eating breakfast.  RN attempting to notify endocrine NP(s) of held prandial insulin, given ss insulin 2.5-3 hours ago, and pt currently eating.  RN receiving busy signals, RN will continue to attempt to reach NP and continue to monitor.

## 2018-09-19 NOTE — PLAN OF CARE
BG and insulin regimen reviewed.    BG goal 140-180.  Patient's BG above goal.  FBG at goal.    Regimen mismatched, secondary to liver disease.      Recommend:  Continue Levemir 15u qHS  Increase Novolog to 15-19u  Moderate correction  POC AC/HS/0200    We will continue to follow.  Please call endocrinology with any questions.      Katie Sanchez MD  Endocrinology Fellow

## 2018-09-19 NOTE — PLAN OF CARE
Problem: Patient Care Overview  Goal: Plan of Care Review  Outcome: Ongoing (interventions implemented as appropriate)  Insulin drip stopped BG Slifing scale. ACHS 2 AM.Pt AAOx4. Pt free from falls. Pt wears non slip socks when ambulating. Pt bed low and locked position. Pt afebrile. Pt IV site without redness or edema.  Pt has denied any pain or discomfort this shift. Pt ate 50% of meal.

## 2018-09-20 ENCOUNTER — TELEPHONE (OUTPATIENT)
Dept: TRANSPLANT | Facility: CLINIC | Age: 68
End: 2018-09-20

## 2018-09-20 PROBLEM — E87.6 HYPOKALEMIA: Status: ACTIVE | Noted: 2018-09-20

## 2018-09-20 LAB
ALBUMIN SERPL BCP-MCNC: 3.9 G/DL
ALBUMIN SERPL BCP-MCNC: 3.9 G/DL
ALBUMIN SERPL BCP-MCNC: 4.3 G/DL
ALBUMIN SERPL BCP-MCNC: 4.4 G/DL
ALBUMIN SERPL BCP-MCNC: 4.4 G/DL
ALBUMIN SERPL BCP-MCNC: 4.5 G/DL
ALP SERPL-CCNC: 172 U/L
ALP SERPL-CCNC: 206 U/L
ALT SERPL W/O P-5'-P-CCNC: 43 U/L
ALT SERPL W/O P-5'-P-CCNC: 46 U/L
ANION GAP SERPL CALC-SCNC: 11 MMOL/L
ANION GAP SERPL CALC-SCNC: 12 MMOL/L
ANION GAP SERPL CALC-SCNC: 12 MMOL/L
ANION GAP SERPL CALC-SCNC: 13 MMOL/L
ANION GAP SERPL CALC-SCNC: 15 MMOL/L
AST SERPL-CCNC: 79 U/L
AST SERPL-CCNC: 97 U/L
BASOPHILS # BLD AUTO: 0.02 K/UL
BASOPHILS NFR BLD: 0.4 %
BILIRUB DIRECT SERPL-MCNC: 0.6 MG/DL
BILIRUB SERPL-MCNC: 1.6 MG/DL
BILIRUB SERPL-MCNC: 1.9 MG/DL
BILIRUB SERPL-MCNC: 2 MG/DL
BUN SERPL-MCNC: 77 MG/DL
BUN SERPL-MCNC: 77 MG/DL
BUN SERPL-MCNC: 79 MG/DL
BUN SERPL-MCNC: 80 MG/DL
BUN SERPL-MCNC: 81 MG/DL
CALCIUM SERPL-MCNC: 9.1 MG/DL
CALCIUM SERPL-MCNC: 9.6 MG/DL
CALCIUM SERPL-MCNC: 9.6 MG/DL
CALCIUM SERPL-MCNC: 9.7 MG/DL
CALCIUM SERPL-MCNC: 9.9 MG/DL
CHLORIDE SERPL-SCNC: 87 MMOL/L
CHLORIDE SERPL-SCNC: 90 MMOL/L
CO2 SERPL-SCNC: 21 MMOL/L
CO2 SERPL-SCNC: 22 MMOL/L
CO2 SERPL-SCNC: 22 MMOL/L
CO2 SERPL-SCNC: 23 MMOL/L
CO2 SERPL-SCNC: 27 MMOL/L
CREAT SERPL-MCNC: 1.2 MG/DL
CREAT SERPL-MCNC: 1.3 MG/DL
CREAT SERPL-MCNC: 1.3 MG/DL
CREAT SERPL-MCNC: 1.4 MG/DL
CREAT SERPL-MCNC: 1.5 MG/DL
DIFFERENTIAL METHOD: ABNORMAL
EOSINOPHIL # BLD AUTO: 0.1 K/UL
EOSINOPHIL NFR BLD: 1.5 %
ERYTHROCYTE [DISTWIDTH] IN BLOOD BY AUTOMATED COUNT: 16.5 %
EST. GFR  (AFRICAN AMERICAN): 40.7 ML/MIN/1.73 M^2
EST. GFR  (AFRICAN AMERICAN): 44.2 ML/MIN/1.73 M^2
EST. GFR  (AFRICAN AMERICAN): 48.4 ML/MIN/1.73 M^2
EST. GFR  (AFRICAN AMERICAN): 48.4 ML/MIN/1.73 M^2
EST. GFR  (AFRICAN AMERICAN): 53.3 ML/MIN/1.73 M^2
EST. GFR  (NON AFRICAN AMERICAN): 35.3 ML/MIN/1.73 M^2
EST. GFR  (NON AFRICAN AMERICAN): 38.4 ML/MIN/1.73 M^2
EST. GFR  (NON AFRICAN AMERICAN): 42 ML/MIN/1.73 M^2
EST. GFR  (NON AFRICAN AMERICAN): 42 ML/MIN/1.73 M^2
EST. GFR  (NON AFRICAN AMERICAN): 46.2 ML/MIN/1.73 M^2
GLUCOSE SERPL-MCNC: 128 MG/DL
GLUCOSE SERPL-MCNC: 193 MG/DL
GLUCOSE SERPL-MCNC: 195 MG/DL
GLUCOSE SERPL-MCNC: 220 MG/DL
GLUCOSE SERPL-MCNC: 258 MG/DL
HCT VFR BLD AUTO: 30 %
HGB BLD-MCNC: 10.4 G/DL
IMM GRANULOCYTES # BLD AUTO: 0.04 K/UL
IMM GRANULOCYTES NFR BLD AUTO: 0.7 %
INR PPP: 1.1
LYMPHOCYTES # BLD AUTO: 0.5 K/UL
LYMPHOCYTES NFR BLD: 9.2 %
MAGNESIUM SERPL-MCNC: 2.3 MG/DL
MCH RBC QN AUTO: 32.3 PG
MCHC RBC AUTO-ENTMCNC: 34.7 G/DL
MCV RBC AUTO: 93 FL
MONOCYTES # BLD AUTO: 0.5 K/UL
MONOCYTES NFR BLD: 9.9 %
NEUTROPHILS # BLD AUTO: 4.3 K/UL
NEUTROPHILS NFR BLD: 78.3 %
NRBC BLD-RTO: 0 /100 WBC
PHOSPHATE SERPL-MCNC: 2.8 MG/DL
PHOSPHATE SERPL-MCNC: 3.1 MG/DL
PHOSPHATE SERPL-MCNC: 3.4 MG/DL
PHOSPHATE SERPL-MCNC: 3.8 MG/DL
PLATELET # BLD AUTO: 86 K/UL
PMV BLD AUTO: 11 FL
POCT GLUCOSE: 151 MG/DL (ref 70–110)
POCT GLUCOSE: 183 MG/DL (ref 70–110)
POCT GLUCOSE: 215 MG/DL (ref 70–110)
POCT GLUCOSE: 217 MG/DL (ref 70–110)
POCT GLUCOSE: 221 MG/DL (ref 70–110)
POCT GLUCOSE: 222 MG/DL (ref 70–110)
POCT GLUCOSE: 229 MG/DL (ref 70–110)
POCT GLUCOSE: 302 MG/DL (ref 70–110)
POTASSIUM SERPL-SCNC: 3.1 MMOL/L
POTASSIUM SERPL-SCNC: 3.2 MMOL/L
POTASSIUM SERPL-SCNC: 3.3 MMOL/L
POTASSIUM SERPL-SCNC: 3.9 MMOL/L
POTASSIUM SERPL-SCNC: 4 MMOL/L
PROT SERPL-MCNC: 6.3 G/DL
PROT SERPL-MCNC: 6.6 G/DL
PROTHROMBIN TIME: 11.2 SEC
RBC # BLD AUTO: 3.22 M/UL
SODIUM SERPL-SCNC: 124 MMOL/L
SODIUM SERPL-SCNC: 125 MMOL/L
SODIUM SERPL-SCNC: 128 MMOL/L
WBC # BLD AUTO: 5.44 K/UL

## 2018-09-20 PROCEDURE — 25000003 PHARM REV CODE 250: Mod: NTX | Performed by: PHYSICIAN ASSISTANT

## 2018-09-20 PROCEDURE — 99233 SBSQ HOSP IP/OBS HIGH 50: CPT | Mod: NTX,,, | Performed by: PHYSICIAN ASSISTANT

## 2018-09-20 PROCEDURE — 63600175 PHARM REV CODE 636 W HCPCS: Mod: JG,NTX | Performed by: NURSE PRACTITIONER

## 2018-09-20 PROCEDURE — 80053 COMPREHEN METABOLIC PANEL: CPT | Mod: NTX

## 2018-09-20 PROCEDURE — P9047 ALBUMIN (HUMAN), 25%, 50ML: HCPCS | Mod: JG,NTX | Performed by: NURSE PRACTITIONER

## 2018-09-20 PROCEDURE — 85610 PROTHROMBIN TIME: CPT | Mod: NTX

## 2018-09-20 PROCEDURE — 80069 RENAL FUNCTION PANEL: CPT | Mod: 91,NTX

## 2018-09-20 PROCEDURE — 85025 COMPLETE CBC W/AUTO DIFF WBC: CPT | Mod: NTX

## 2018-09-20 PROCEDURE — 80069 RENAL FUNCTION PANEL: CPT | Mod: NTX

## 2018-09-20 PROCEDURE — 80076 HEPATIC FUNCTION PANEL: CPT | Mod: NTX

## 2018-09-20 PROCEDURE — 25000003 PHARM REV CODE 250: Mod: NTX | Performed by: NURSE PRACTITIONER

## 2018-09-20 PROCEDURE — 36415 COLL VENOUS BLD VENIPUNCTURE: CPT | Mod: NTX

## 2018-09-20 PROCEDURE — 82247 BILIRUBIN TOTAL: CPT | Mod: NTX

## 2018-09-20 PROCEDURE — 20600001 HC STEP DOWN PRIVATE ROOM: Mod: NTX

## 2018-09-20 PROCEDURE — 63600175 PHARM REV CODE 636 W HCPCS: Mod: NTX | Performed by: PHYSICIAN ASSISTANT

## 2018-09-20 PROCEDURE — 83735 ASSAY OF MAGNESIUM: CPT | Mod: NTX

## 2018-09-20 RX ORDER — INSULIN ASPART 100 [IU]/ML
17-21 INJECTION, SOLUTION INTRAVENOUS; SUBCUTANEOUS
Status: DISCONTINUED | OUTPATIENT
Start: 2018-09-20 | End: 2018-09-21

## 2018-09-20 RX ORDER — POTASSIUM CHLORIDE 20 MEQ/1
40 TABLET, EXTENDED RELEASE ORAL 2 TIMES DAILY
Status: DISCONTINUED | OUTPATIENT
Start: 2018-09-20 | End: 2018-09-20

## 2018-09-20 RX ORDER — DOCUSATE SODIUM 100 MG/1
100 CAPSULE, LIQUID FILLED ORAL 2 TIMES DAILY
Status: DISCONTINUED | OUTPATIENT
Start: 2018-09-20 | End: 2018-09-21 | Stop reason: HOSPADM

## 2018-09-20 RX ADMIN — SODIUM BICARBONATE 650 MG TABLET 1300 MG: at 08:09

## 2018-09-20 RX ADMIN — INSULIN ASPART 19 UNITS: 100 INJECTION, SOLUTION INTRAVENOUS; SUBCUTANEOUS at 08:09

## 2018-09-20 RX ADMIN — MIDODRINE HYDROCHLORIDE 15 MG: 5 TABLET ORAL at 01:09

## 2018-09-20 RX ADMIN — ENTECAVIR 0.5 MG: 0.5 TABLET ORAL at 08:09

## 2018-09-20 RX ADMIN — LACTULOSE 20 G: 20 SOLUTION ORAL at 12:09

## 2018-09-20 RX ADMIN — RIFAXIMIN 550 MG: 550 TABLET ORAL at 08:09

## 2018-09-20 RX ADMIN — INSULIN ASPART 17 UNITS: 100 INJECTION, SOLUTION INTRAVENOUS; SUBCUTANEOUS at 06:09

## 2018-09-20 RX ADMIN — HEPARIN SODIUM 5000 UNITS: 5000 INJECTION, SOLUTION INTRAVENOUS; SUBCUTANEOUS at 08:09

## 2018-09-20 RX ADMIN — MIDODRINE HYDROCHLORIDE 15 MG: 5 TABLET ORAL at 08:09

## 2018-09-20 RX ADMIN — POTASSIUM BICARBONATE 50 MEQ: 25 TABLET, EFFERVESCENT ORAL at 12:09

## 2018-09-20 RX ADMIN — CEFTRIAXONE SODIUM 1 G: 1 INJECTION, POWDER, FOR SOLUTION INTRAMUSCULAR; INTRAVENOUS at 08:09

## 2018-09-20 RX ADMIN — INSULIN ASPART 4 UNITS: 100 INJECTION, SOLUTION INTRAVENOUS; SUBCUTANEOUS at 08:09

## 2018-09-20 RX ADMIN — LACTULOSE 20 G: 20 SOLUTION ORAL at 08:09

## 2018-09-20 RX ADMIN — INSULIN ASPART 15 UNITS: 100 INJECTION, SOLUTION INTRAVENOUS; SUBCUTANEOUS at 01:09

## 2018-09-20 RX ADMIN — INSULIN ASPART 2 UNITS: 100 INJECTION, SOLUTION INTRAVENOUS; SUBCUTANEOUS at 02:09

## 2018-09-20 RX ADMIN — ALBUMIN HUMAN 50 G: 0.25 SOLUTION INTRAVENOUS at 08:09

## 2018-09-20 RX ADMIN — LACTULOSE 20 G: 20 SOLUTION ORAL at 06:09

## 2018-09-20 RX ADMIN — INSULIN ASPART 4 UNITS: 100 INJECTION, SOLUTION INTRAVENOUS; SUBCUTANEOUS at 01:09

## 2018-09-20 RX ADMIN — DOCUSATE SODIUM 100 MG: 100 CAPSULE, LIQUID FILLED ORAL at 08:09

## 2018-09-20 RX ADMIN — MIDODRINE HYDROCHLORIDE 15 MG: 5 TABLET ORAL at 05:09

## 2018-09-20 RX ADMIN — INSULIN ASPART 2 UNITS: 100 INJECTION, SOLUTION INTRAVENOUS; SUBCUTANEOUS at 06:09

## 2018-09-20 RX ADMIN — SODIUM BICARBONATE 650 MG TABLET 1300 MG: at 03:09

## 2018-09-20 NOTE — SUBJECTIVE & OBJECTIVE
Scheduled Meds:   cefTRIAXone (ROCEPHIN) IVPB  1 g Intravenous Q24H    docusate sodium  100 mg Oral BID    entecavir  0.5 mg Oral Daily    heparin (porcine)  5,000 Units Subcutaneous Q12H    insulin aspart U-100  15-19 Units Subcutaneous TIDWM    insulin detemir U-100  15 Units Subcutaneous QHS    lactulose  20 g Oral QID    midodrine  15 mg Oral Q8H    rifAXIMin  550 mg Oral BID    sodium bicarbonate  1,300 mg Oral TID     Continuous Infusions:  PRN Meds:dextrose 50%, dextrose 50%, glucagon (human recombinant), glucose, glucose, insulin aspart U-100, lidocaine-prilocaine, sodium chloride 0.9%, traMADol    Review of Systems   Constitutional: Positive for appetite change. Negative for chills, fatigue and fever.   HENT: Negative for congestion and facial swelling.    Eyes: Negative for pain, discharge and visual disturbance.   Respiratory: Negative for cough, chest tightness and shortness of breath.    Cardiovascular: Negative for chest pain, palpitations and leg swelling.   Gastrointestinal: Positive for abdominal distention (improved post para) and nausea (chronic). Negative for abdominal pain, constipation, diarrhea and vomiting.   Endocrine: Negative.    Genitourinary: Negative for difficulty urinating, dysuria, frequency and urgency.   Musculoskeletal: Negative for back pain and neck pain.   Skin: Negative for color change, pallor, rash and wound.   Allergic/Immunologic: Negative for immunocompromised state.   Neurological: Positive for weakness. Negative for dizziness and headaches.   Psychiatric/Behavioral: Negative for confusion and sleep disturbance. The patient is nervous/anxious.      Objective:     Vital Signs (Most Recent):  Temp: 97.6 °F (36.4 °C) (09/20/18 1237)  Pulse: 103 (09/20/18 1224)  Resp: 14 (09/20/18 1224)  BP: 114/67 (09/20/18 1224)  SpO2: 99 % (09/20/18 1224) Vital Signs (24h Range):  Temp:  [97.6 °F (36.4 °C)-98.1 °F (36.7 °C)] 97.6 °F (36.4 °C)  Pulse:  [] 103  Resp:   [12-14] 14  SpO2:  [98 %-100 %] 99 %  BP: ()/(45-67) 114/67     Weight: 52.5 kg (115 lb 11.9 oz)  Body mass index is 20 kg/m².    Intake/Output - Last 3 Shifts       09/18 0700 - 09/19 0659 09/19 0700 - 09/20 0659 09/20 0700 - 09/21 0659    P.O.  1480 420    I.V. (mL/kg) 6 (0.1)      Total Intake(mL/kg) 6 (0.1) 1480 (28.2) 420 (8)    Other 5100      Total Output 5100      Net -5094 +1480 +420           Urine Occurrence  3 x     Stool Occurrence  0 x 3 x          Physical Exam   Constitutional: She is oriented to person, place, and time. She appears well-developed. No distress.   Temporal and distal extremity muscle wasting   HENT:   Head: Normocephalic and atraumatic.   Eyes: Scleral icterus is present.   Cardiovascular: Normal rate, regular rhythm and normal heart sounds.   Pulmonary/Chest: Effort normal. No respiratory distress. She has decreased breath sounds in the right lower field and the left lower field. She has no rales.   Abdominal: Bowel sounds are normal. She exhibits distension (mild, abdomen soft) and ascites. There is no tenderness.   Musculoskeletal: Normal range of motion. She exhibits edema (trace).   Neurological: She is alert and oriented to person, place, and time.   Skin: Skin is warm and dry. She is not diaphoretic.   Psychiatric: She has a normal mood and affect. Her behavior is normal. Judgment and thought content normal.   Nursing note and vitals reviewed.      Laboratory:  Immunosuppressants     None        CBC:   Recent Labs   Lab  09/20/18 0827   WBC  5.44   RBC  3.22*   HGB  10.4*   HCT  30.0*   PLT  86*   MCV  93   MCH  32.3*   MCHC  34.7     CMP:   Recent Labs   Lab  09/20/18 0827 09/20/18   1119   GLU  195*  193*  258*   CALCIUM  9.9  9.6  9.7   ALBUMIN  4.4  4.3  4.5   PROT  6.6   --    NA  125*  124*  124*   K  3.3*  3.2*  3.1*   CO2  23  22*  22*   CL  90*  90*  87*   BUN  77*  77*  80*   CREATININE  1.3  1.3  1.4   ALKPHOS  206*   --    ALT  46*   --    AST   79*   --    BILITOT  2.0*  1.9*   --      Labs within the past 24 hours have been reviewed.    Diagnostic Results:  pertinent imaging reviewed

## 2018-09-20 NOTE — PLAN OF CARE
Problem: Patient Care Overview  Goal: Plan of Care Review  Outcome: Ongoing (interventions implemented as appropriate)  AAOx4. VS stable, afebrile. Denies any pain/discomfort. Accuchecks continued achs & 2am. Meal time and SSI given as needed. Meal time Novolog increased to 17-21 units and levemir to 17 units. Telemetry monitor in place. Normal sinus rhythm. Albumin 50g administered this am for bacterial peritonitis. Paracentesis scheduled for tomorrow. 3 BM reported today. L FA 22g, CDI. No redness, no swelling. Independent. Bed low and locked, call bell within reach, side rails x2. In NAD. Will continue to monitor.

## 2018-09-20 NOTE — ASSESSMENT & PLAN NOTE
- cont with lactulose and rifaximin for HE control. Monitor.   - with fewer BMs than usual (pt feels slightly constipated), will also order stool softener.

## 2018-09-20 NOTE — PROGRESS NOTES
[facility]  Liver Transplant  Progress Note    Patient Name: Scarlett Reddy  MRN: [id number]  Admission Date: 9/17/2018  Hospital Length of Stay: 3 days  Code Status: Full Code  Primary Care Provider: Primary Doctor No    Subjective:     History of Present Illness:  Scarlett Reddy is a 68 yr old female with PMH of decompensated cirrhosis due to Hep B c/b ascites requiring twice weekly paracentesis, hepatic encephalopathy, esophageal varices, and hyponatremia. She is listed for liver transplant with MELD 23. She presented to IR for paracentesis today (9/17) however was noted to be hyponatremic on labs (Na 122) and was sent to ED. In the ED she reported not feeling well this weekend, c/o diffuse chest discomfort which she attributes to anxiety about labs and weekly testing and has since resolved, and general fatigue. She and her daughter express concerns over elevated glucose also. C/o chronic nausea, no vomiting. Last paracentesis was 9/14 and she had 6.8L removed, , 22% segs, cultures NGTD. She is scheduled for outpatient paracentesis tomorrow 9/18, will reschedule for inpatient. She reports multiple BM's daily with lactulose, although not as frequent over the weekend. Na decreased on outpatient labs (122) but likely related to hyperglycemia (274). Diuretics have remained on hold for hyponatremia. In addition, patient has been non-compliant with low Na and fluid restricted diet, which she and her family report is d/t her poor appetitite. She will be admitted for monitoring of her sodium and glucose, 1L fluid restricted low sodium diet, albumin administration, urine studies, endocrine consult, and nutrition consult. Will plan for paracentesis tomorrow.          Hospital Course:  Admitted due to hyponatremia.  Placed on fluid restriction, low sodium diet, with some improvement.  Blood sugars were uncontrolled, >400.  Endocrine consulted and initiated insulin gtt. Insulin gtt now d/c'd and subq  insulin regimen adjusted. Sodium improving with controlling hyperglycemia. Potassium 3.3 today, will replace with k-lyte.   Paracentesis completed 9/18 with 5.1L removed, , Segs 89% suggestive of SBP.   Ceftriaxone initiated.  Will plan to repeat para tomorrow (9/21). Listed for liver transplant with MELD 24, however on hold due to SBP & hyponatremia.    Scheduled Meds:   cefTRIAXone (ROCEPHIN) IVPB  1 g Intravenous Q24H    docusate sodium  100 mg Oral BID    entecavir  0.5 mg Oral Daily    heparin (porcine)  5,000 Units Subcutaneous Q12H    insulin aspart U-100  15-19 Units Subcutaneous TIDWM    insulin detemir U-100  15 Units Subcutaneous QHS    lactulose  20 g Oral QID    midodrine  15 mg Oral Q8H    rifAXIMin  550 mg Oral BID    sodium bicarbonate  1,300 mg Oral TID     Continuous Infusions:  PRN Meds:dextrose 50%, dextrose 50%, glucagon (human recombinant), glucose, glucose, insulin aspart U-100, lidocaine-prilocaine, sodium chloride 0.9%, traMADol    Review of Systems   Constitutional: Positive for appetite change. Negative for chills, fatigue and fever.   HENT: Negative for congestion and facial swelling.    Eyes: Negative for pain, discharge and visual disturbance.   Respiratory: Negative for cough, chest tightness and shortness of breath.    Cardiovascular: Negative for chest pain, palpitations and leg swelling.   Gastrointestinal: Positive for abdominal distention (improved post para) and nausea (chronic). Negative for abdominal pain, constipation, diarrhea and vomiting.   Endocrine: Negative.    Genitourinary: Negative for difficulty urinating, dysuria, frequency and urgency.   Musculoskeletal: Negative for back pain and neck pain.   Skin: Negative for color change, pallor, rash and wound.   Allergic/Immunologic: Negative for immunocompromised state.   Neurological: Positive for weakness. Negative for dizziness and headaches.   Psychiatric/Behavioral: Negative for confusion and sleep  disturbance. The patient is nervous/anxious.      Objective:     Vital Signs (Most Recent):  Temp: 97.6 °F (36.4 °C) (09/20/18 1237)  Pulse: 103 (09/20/18 1224)  Resp: 14 (09/20/18 1224)  BP: 114/67 (09/20/18 1224)  SpO2: 99 % (09/20/18 1224) Vital Signs (24h Range):  Temp:  [97.6 °F (36.4 °C)-98.1 °F (36.7 °C)] 97.6 °F (36.4 °C)  Pulse:  [] 103  Resp:  [12-14] 14  SpO2:  [98 %-100 %] 99 %  BP: ()/(45-67) 114/67     Weight: 52.5 kg (115 lb 11.9 oz)  Body mass index is 20 kg/m².    Intake/Output - Last 3 Shifts       09/18 0700 - 09/19 0659 09/19 0700 - 09/20 0659 09/20 0700 - 09/21 0659    P.O.  1480 420    I.V. (mL/kg) 6 (0.1)      Total Intake(mL/kg) 6 (0.1) 1480 (28.2) 420 (8)    Other 5100      Total Output 5100      Net -5094 +1480 +420           Urine Occurrence  3 x     Stool Occurrence  0 x 3 x          Physical Exam   Constitutional: She is oriented to person, place, and time. She appears well-developed. No distress.   Temporal and distal extremity muscle wasting   HENT:   Head: Normocephalic and atraumatic.   Eyes: Scleral icterus is present.   Cardiovascular: Normal rate, regular rhythm and normal heart sounds.   Pulmonary/Chest: Effort normal. No respiratory distress. She has decreased breath sounds in the right lower field and the left lower field. She has no rales.   Abdominal: Bowel sounds are normal. She exhibits distension (mild, abdomen soft) and ascites. There is no tenderness.   Musculoskeletal: Normal range of motion. She exhibits edema (trace).   Neurological: She is alert and oriented to person, place, and time.   Skin: Skin is warm and dry. She is not diaphoretic.   Psychiatric: She has a normal mood and affect. Her behavior is normal. Judgment and thought content normal.   Nursing note and vitals reviewed.      Laboratory:  Immunosuppressants     None        CBC:   Recent Labs   Lab  09/20/18   0827   WBC  5.44   RBC  3.22*   HGB  10.4*   HCT  30.0*   PLT  86*   MCV  93   MCH   32.3*   MCHC  34.7     CMP:   Recent Labs   Lab  09/20/18   0827  09/20/18   1119   GLU  195*  193*  258*   CALCIUM  9.9  9.6  9.7   ALBUMIN  4.4  4.3  4.5   PROT  6.6   --    NA  125*  124*  124*   K  3.3*  3.2*  3.1*   CO2  23  22*  22*   CL  90*  90*  87*   BUN  77*  77*  80*   CREATININE  1.3  1.3  1.4   ALKPHOS  206*   --    ALT  46*   --    AST  79*   --    BILITOT  2.0*  1.9*   --      Labs within the past 24 hours have been reviewed.    Diagnostic Results:  pertinent imaging reviewed    Assessment/Plan:     * Spontaneous bacterial peritonitis    - paracentesis 9/18  - , 89% segs, 9 lymphs  - started ceftriaxone 9/18  - albumin 50gm 9/18, albumin 50gm 9/20  - repeat para Friday 9/21           Hyponatremia    - Fluid restriction of 1L/day.   - low sodium diet  - serial Na levels to monitor closely.   - UA with 2+ glucose, Ucx ngtd, urine sodium <20, urine osm 546  - sodium improving daily, 125 today        Liver transplant candidate    MELD-Na score: 24 at 9/20/2018 11:19 AM  MELD score: 13 at 9/20/2018 11:19 AM  Calculated from:  Serum Creatinine: 1.4 mg/dL at 9/20/2018 11:19 AM  Serum Sodium: 124 mmol/L (Rounded to 125 mmol/L) at 9/20/2018 11:19 AM  Total Bilirubin: 1.9 mg/dL at 9/20/2018  8:27 AM  INR(ratio): 1.1 at 9/20/2018  8:27 AM  Age: 68 years    Listed with MELD 24, currently on hold due to hyponatremia & sbp            Type 2 diabetes mellitus with diabetic polyneuropathy, with long-term current use of insulin    - patient on insulin per endo recs  - reorder home regimen with SSI  - BG elevated, likely contributing to hyponatremia  - appreciate assistance          Hepatic encephalopathy    - cont with lactulose and rifaximin for HE control. Monitor.   - with fewer BMs than usual (pt feels slightly constipated), will also order stool softener.         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    - See liver transplant candidate.  - continue entecavir        Hypokalemia    -  "potassium 3.3, replace with PO  - monitor           Ascites    - patient with chronic ascites. On admit, abdomen distended.  - off of diuretics d/t hyponatremia, so has been requiring more frequent paracentesis (2x weekly)  - last para 9/14 with 6.8L removed,  with 22% segs, cultures NGTD  - paracentesis 9/18, 5.1L removed, WBC >700 with 89% segs.   -ceftriaxone started for SBP on 9/18/18  - plan to repeat para tomorrow 9/21        Severe malnutrition    - Supplements ordered for nutrition.   - Dietary consult for calorie count, "prehab", poor appetite, and low sodium diet education              VTE Risk Mitigation (From admission, onward)        Ordered     heparin (porcine) injection 5,000 Units  Every 12 hours      09/17/18 1136     Place sequential compression device  Until discontinued      09/17/18 1136     IP VTE LOW RISK PATIENT  Once      09/17/18 1136          The patients clinical status was discussed at multidisplinary rounds, involving transplant surgery, transplant medicine, pharmacy, nursing, nutrition, and social work    Discharge Planning: not a candidate for d/c at this time. Repeating para tomorrow 9/21 to assess for clearance/improvement of SBP.     Joselin Montes PA-C  Liver Transplant  [facility]  "

## 2018-09-20 NOTE — PLAN OF CARE
Problem: Patient Care Overview  Goal: Plan of Care Review  Outcome: Ongoing (interventions implemented as appropriate)  No acute events overnight. VSS. Lactulose increased since daughter reports 1 BM yesterday. -222, ss coverage given. Fall precautions maintained. Bed wheels locked, bed in lowest position, upper SR up x 2, call light in reach, non-skid socks when OOB. Instructed pt to call for assistance as needed. Will cont to monitor.

## 2018-09-20 NOTE — ASSESSMENT & PLAN NOTE
- patient with chronic ascites. On admit, abdomen distended.  - off of diuretics d/t hyponatremia, so has been requiring more frequent paracentesis (2x weekly)  - last para 9/14 with 6.8L removed,  with 22% segs, cultures NGTD  - paracentesis 9/18, 5.1L removed, WBC >700 with 89% segs.   -ceftriaxone started for SBP on 9/18/18  - plan to repeat para tomorrow 9/21

## 2018-09-20 NOTE — PLAN OF CARE
BG and insulin regimen reviewed.     BG goal 140-180.  Patient's BG above goal.  FBG also above goal.    Diet outside hospital source given Tenriism beliefs.  Regimen mismatched, secondary to liver disease.        Recommend:  Increase Levemir 17u qHS  Increase Novolog to 17-21u  Moderate correction  POC AC/HS/0200     We will continue to follow.  Please call endocrinology with any questions.        Katie Sanchez MD  Endocrinology Fellow

## 2018-09-20 NOTE — ASSESSMENT & PLAN NOTE
MELD-Na score: 24 at 9/20/2018 11:19 AM  MELD score: 13 at 9/20/2018 11:19 AM  Calculated from:  Serum Creatinine: 1.4 mg/dL at 9/20/2018 11:19 AM  Serum Sodium: 124 mmol/L (Rounded to 125 mmol/L) at 9/20/2018 11:19 AM  Total Bilirubin: 1.9 mg/dL at 9/20/2018  8:27 AM  INR(ratio): 1.1 at 9/20/2018  8:27 AM  Age: 68 years    Listed with MELD 24, currently on hold due to hyponatremia & sbp

## 2018-09-20 NOTE — ASSESSMENT & PLAN NOTE
- patient on insulin per endo recs  - reorder home regimen with SSI  - BG elevated, likely contributing to hyponatremia  - appreciate assistance

## 2018-09-20 NOTE — TELEPHONE ENCOUNTER
PATIENT NAME: Scarlett Welchmocurtis  [facility] #: [id number]    Lab Results   Component Value Date    CREATININE 1.3 09/20/2018    CREATININE 1.3 09/20/2018     (L) 09/20/2018     (L) 09/20/2018    BILITOT 1.9 (H) 09/20/2018    BILITOT 2.0 (H) 09/20/2018    ALBUMIN 4.3 09/20/2018    ALBUMIN 4.4 09/20/2018    INR 1.1 09/20/2018     MELD  24  Encephalopathy: 1 - 2  Ascites: moderate  Dialysis: no     Recertification requestor: Charley Simms

## 2018-09-20 NOTE — ASSESSMENT & PLAN NOTE
- Fluid restriction of 1L/day.   - low sodium diet  - serial Na levels to monitor closely.   - UA with 2+ glucose, Ucx ngtd, urine sodium <20, urine osm 546  - sodium improving daily, 125 today

## 2018-09-21 VITALS
HEIGHT: 64 IN | DIASTOLIC BLOOD PRESSURE: 52 MMHG | BODY MASS INDEX: 20.55 KG/M2 | RESPIRATION RATE: 16 BRPM | WEIGHT: 120.38 LBS | TEMPERATURE: 98 F | OXYGEN SATURATION: 100 % | SYSTOLIC BLOOD PRESSURE: 96 MMHG | HEART RATE: 95 BPM

## 2018-09-21 LAB
ALBUMIN SERPL BCP-MCNC: 4.1 G/DL
ALP SERPL-CCNC: 185 U/L
ALT SERPL W/O P-5'-P-CCNC: 40 U/L
ANION GAP SERPL CALC-SCNC: 13 MMOL/L
APPEARANCE FLD: NORMAL
AST SERPL-CCNC: 74 U/L
BASOPHILS # BLD AUTO: 0.01 K/UL
BASOPHILS NFR BLD: 0.3 %
BILIRUB SERPL-MCNC: 1.4 MG/DL
BILIRUB SERPL-MCNC: 1.4 MG/DL
BODY FLD TYPE: NORMAL
BUN SERPL-MCNC: 78 MG/DL
CALCIUM SERPL-MCNC: 9.3 MG/DL
CHLORIDE SERPL-SCNC: 92 MMOL/L
CO2 SERPL-SCNC: 23 MMOL/L
COLOR FLD: NORMAL
CREAT SERPL-MCNC: 1.1 MG/DL
DIFFERENTIAL METHOD: ABNORMAL
EOSINOPHIL # BLD AUTO: 0.1 K/UL
EOSINOPHIL NFR BLD: 2.4 %
ERYTHROCYTE [DISTWIDTH] IN BLOOD BY AUTOMATED COUNT: 16.5 %
EST. GFR  (AFRICAN AMERICAN): 59.2 ML/MIN/1.73 M^2
EST. GFR  (NON AFRICAN AMERICAN): 51.3 ML/MIN/1.73 M^2
GLUCOSE SERPL-MCNC: 155 MG/DL
HCT VFR BLD AUTO: 24.1 %
HGB BLD-MCNC: 8.4 G/DL
IMM GRANULOCYTES # BLD AUTO: 0.02 K/UL
IMM GRANULOCYTES NFR BLD AUTO: 0.6 %
INR PPP: 1.2
LYMPHOCYTES # BLD AUTO: 0.4 K/UL
LYMPHOCYTES NFR BLD: 12.7 %
LYMPHOCYTES NFR FLD MANUAL: 86 %
MAGNESIUM SERPL-MCNC: 2.4 MG/DL
MCH RBC QN AUTO: 32.6 PG
MCHC RBC AUTO-ENTMCNC: 34.9 G/DL
MCV RBC AUTO: 93 FL
MESOTHL CELL NFR FLD MANUAL: 3 %
MONOCYTES # BLD AUTO: 0.4 K/UL
MONOCYTES NFR BLD: 11.7 %
MONOS+MACROS NFR FLD MANUAL: 7 %
NEUTROPHILS # BLD AUTO: 2.4 K/UL
NEUTROPHILS NFR BLD: 72.3 %
NEUTROPHILS NFR FLD MANUAL: 4 %
NRBC BLD-RTO: 0 /100 WBC
PLATELET # BLD AUTO: 61 K/UL
PMV BLD AUTO: 12 FL
POCT GLUCOSE: 128 MG/DL (ref 70–110)
POCT GLUCOSE: 182 MG/DL (ref 70–110)
POCT GLUCOSE: 261 MG/DL (ref 70–110)
POTASSIUM SERPL-SCNC: 3.9 MMOL/L
PROT SERPL-MCNC: 5.8 G/DL
PROTHROMBIN TIME: 12 SEC
RBC # BLD AUTO: 2.58 M/UL
SODIUM SERPL-SCNC: 128 MMOL/L
WBC # BLD AUTO: 3.32 K/UL
WBC # FLD: 472 /CU MM

## 2018-09-21 PROCEDURE — 25000003 PHARM REV CODE 250: Mod: NTX | Performed by: PHYSICIAN ASSISTANT

## 2018-09-21 PROCEDURE — 87070 CULTURE OTHR SPECIMN AEROBIC: CPT | Mod: NTX

## 2018-09-21 PROCEDURE — 87205 SMEAR GRAM STAIN: CPT | Mod: NTX

## 2018-09-21 PROCEDURE — 85025 COMPLETE CBC W/AUTO DIFF WBC: CPT | Mod: NTX

## 2018-09-21 PROCEDURE — 87075 CULTR BACTERIA EXCEPT BLOOD: CPT | Mod: NTX

## 2018-09-21 PROCEDURE — 89051 BODY FLUID CELL COUNT: CPT | Mod: NTX

## 2018-09-21 PROCEDURE — 87102 FUNGUS ISOLATION CULTURE: CPT | Mod: NTX

## 2018-09-21 PROCEDURE — 99232 SBSQ HOSP IP/OBS MODERATE 35: CPT | Mod: NTX,,, | Performed by: INTERNAL MEDICINE

## 2018-09-21 PROCEDURE — 85610 PROTHROMBIN TIME: CPT | Mod: NTX

## 2018-09-21 PROCEDURE — 0W9G3ZZ DRAINAGE OF PERITONEAL CAVITY, PERCUTANEOUS APPROACH: ICD-10-PCS | Performed by: RADIOLOGY

## 2018-09-21 PROCEDURE — 83735 ASSAY OF MAGNESIUM: CPT | Mod: NTX

## 2018-09-21 PROCEDURE — 25000003 PHARM REV CODE 250: Mod: NTX | Performed by: NURSE PRACTITIONER

## 2018-09-21 PROCEDURE — 80053 COMPREHEN METABOLIC PANEL: CPT | Mod: NTX

## 2018-09-21 PROCEDURE — 63600175 PHARM REV CODE 636 W HCPCS: Mod: NTX | Performed by: PHYSICIAN ASSISTANT

## 2018-09-21 PROCEDURE — 36415 COLL VENOUS BLD VENIPUNCTURE: CPT | Mod: NTX

## 2018-09-21 RX ORDER — INSULIN GLARGINE 100 [IU]/ML
18 INJECTION, SOLUTION SUBCUTANEOUS NIGHTLY
Qty: 15 ML | Refills: 3 | Status: ON HOLD
Start: 2018-09-21 | End: 2018-09-27 | Stop reason: SDUPTHER

## 2018-09-21 RX ORDER — INSULIN ASPART 100 [IU]/ML
19-23 INJECTION, SOLUTION INTRAVENOUS; SUBCUTANEOUS
Status: DISCONTINUED | OUTPATIENT
Start: 2018-09-21 | End: 2018-09-21

## 2018-09-21 RX ORDER — CEFPODOXIME PROXETIL 200 MG/1
200 TABLET, FILM COATED ORAL 2 TIMES DAILY
Qty: 14 TABLET | Refills: 0 | Status: ON HOLD | OUTPATIENT
Start: 2018-09-21 | End: 2018-09-27 | Stop reason: HOSPADM

## 2018-09-21 RX ORDER — INSULIN ASPART 100 [IU]/ML
17 INJECTION, SOLUTION INTRAVENOUS; SUBCUTANEOUS
Qty: 15 ML | Refills: 1 | Status: ON HOLD | OUTPATIENT
Start: 2018-09-21 | End: 2018-09-27 | Stop reason: SDUPTHER

## 2018-09-21 RX ORDER — SODIUM BICARBONATE 650 MG/1
1300 TABLET ORAL 3 TIMES DAILY
Qty: 120 TABLET | Refills: 11 | Status: ON HOLD | COMMUNITY
Start: 2018-09-21 | End: 2018-12-20 | Stop reason: HOSPADM

## 2018-09-21 RX ORDER — INSULIN ASPART 100 [IU]/ML
17 INJECTION, SOLUTION INTRAVENOUS; SUBCUTANEOUS
Status: DISCONTINUED | OUTPATIENT
Start: 2018-09-21 | End: 2018-09-21 | Stop reason: HOSPADM

## 2018-09-21 RX ORDER — INSULIN ASPART 100 [IU]/ML
19-23 INJECTION, SOLUTION INTRAVENOUS; SUBCUTANEOUS
Qty: 15 ML | Refills: 1 | Status: SHIPPED | OUTPATIENT
Start: 2018-09-21 | End: 2018-09-21 | Stop reason: SDUPTHER

## 2018-09-21 RX ADMIN — RIFAXIMIN 550 MG: 550 TABLET ORAL at 08:09

## 2018-09-21 RX ADMIN — INSULIN ASPART 17 UNITS: 100 INJECTION, SOLUTION INTRAVENOUS; SUBCUTANEOUS at 01:09

## 2018-09-21 RX ADMIN — LACTULOSE 20 G: 20 SOLUTION ORAL at 08:09

## 2018-09-21 RX ADMIN — LACTULOSE 20 G: 20 SOLUTION ORAL at 01:09

## 2018-09-21 RX ADMIN — INSULIN ASPART 2 UNITS: 100 INJECTION, SOLUTION INTRAVENOUS; SUBCUTANEOUS at 08:09

## 2018-09-21 RX ADMIN — HEPARIN SODIUM 5000 UNITS: 5000 INJECTION, SOLUTION INTRAVENOUS; SUBCUTANEOUS at 08:09

## 2018-09-21 RX ADMIN — ENTECAVIR 0.5 MG: 0.5 TABLET ORAL at 08:09

## 2018-09-21 RX ADMIN — SODIUM BICARBONATE 650 MG TABLET 1300 MG: at 08:09

## 2018-09-21 RX ADMIN — DOCUSATE SODIUM 100 MG: 100 CAPSULE, LIQUID FILLED ORAL at 08:09

## 2018-09-21 RX ADMIN — SODIUM BICARBONATE 650 MG TABLET 1300 MG: at 04:09

## 2018-09-21 RX ADMIN — MIDODRINE HYDROCHLORIDE 15 MG: 5 TABLET ORAL at 04:09

## 2018-09-21 RX ADMIN — MIDODRINE HYDROCHLORIDE 15 MG: 5 TABLET ORAL at 01:09

## 2018-09-21 RX ADMIN — INSULIN ASPART 6 UNITS: 100 INJECTION, SOLUTION INTRAVENOUS; SUBCUTANEOUS at 01:09

## 2018-09-21 RX ADMIN — LACTULOSE 20 G: 20 SOLUTION ORAL at 04:09

## 2018-09-21 RX ADMIN — INSULIN ASPART 3 UNITS: 100 INJECTION, SOLUTION INTRAVENOUS; SUBCUTANEOUS at 01:09

## 2018-09-21 RX ADMIN — INSULIN ASPART 17 UNITS: 100 INJECTION, SOLUTION INTRAVENOUS; SUBCUTANEOUS at 08:09

## 2018-09-21 NOTE — PLAN OF CARE
BG and insulin regimen reviewed.     BG goal 140-180.  Patient's BG above goal.  FBG at goal, but with correction insulin.    Diet outside hospital source given Alevism beliefs.  Regimen mismatched, secondary to liver disease.        Recommend:  Increase Levemir to 18u qHS  Increase Novolog to 19-23u  Moderate correction  POC AC/HS/0200     We will continue to follow.  Please call endocrinology with any questions.        Katie Sanchez MD  Endocrinology Fellow

## 2018-09-21 NOTE — PROGRESS NOTES
Pt transferred from inpatient room to MPU room 1 for paracentesis. Daughter and  at bedside. Awaiting doctor. Will continue to monitor.

## 2018-09-21 NOTE — NURSING
- Patient discharged to home with family.  - Discharge instructions (up-to-date as of 1700, including new insulin orders) reviewed with patient and family and .  - Family verbalizes no questions. Peripheral IV and all monitoring equipment removed.  - Family bringing patient downstairs in wheelchair at this time.

## 2018-09-21 NOTE — NURSING
HS accu check skipped as pt finished eating dinner at 2100. Will check BG at 0100. Discussed with pt and daughter.

## 2018-09-21 NOTE — PROGRESS NOTES
Met with patient, , daughter with , Dunia.  Reviewed outpatient appointments. Daughter reports has [facility] booklet from recent discharge.  Allowed time for questions and answers.

## 2018-09-21 NOTE — H&P
Inpatient Radiology Pre-procedure Note    History of Present Illness:    Scarlett Reddy is a 68 y.o. female who presents for US-guided paracentesis.    Admission H&P reviewed.  Past Medical History:   Diagnosis Date    Anemia requiring transfusions 8/16/2018    Ascites     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 8/15/2018    Cirrhosis     Cough 8/30/2018    Esophageal varices     Esophageal varices without bleeding 8/15/2018    Hepatic encephalopathy     Hepatitis B     Hepatitis D virus infection     Hypersplenism     Hyponatremia     Hypotension     Liver transplant candidate     Neutropenia     Portal hypertension     Severe malnutrition 8/16/2018    Type 2 diabetes mellitus, with long-term current use of insulin 8/15/2018     Past Surgical History:   Procedure Laterality Date    EGD (ESOPHAGOGASTRODUODENOSCOPY) N/A 8/17/2018    Per[facility]    ESOPHAGOGASTRODUODENOSCOPY N/A 8/17/2018    Procedure: EGD (ESOPHAGOGASTRODUODENOSCOPY);  Surgeon: Travon Delgadillo MD;  Location: [facility] ([address] [place] [address]);  Service: Endoscopy;  Laterality: N/A;       Review of Systems:   As documented in primary team H&P    Home Meds:   Prior to Admission medications    Medication Sig Start Date End Date Taking? Authorizing Provider   blood sugar diagnostic (ACCU-CHEK DC) Strp 1 strip by Misc.(Non-Drug; Combo Route) route 3 (three) times daily.   Yes Historical Provider, MD   entecavir (BARACLUDE) 0.5 MG Tab Take 1 tablet (0.5 mg total) by mouth once daily. 9/4/18  Yes Rita Rubin NP   ergocalciferol, vitamin D2, (VITAMIN D ORAL) Take by mouth.   Yes Historical Provider, MD   lactulose (CHRONULAC) 10 gram/15 mL solution Take 30 mLs (20 g total) by mouth 3 (three) times daily. Adjust dose to have 4 bowel movements daily 9/12/18  Yes Yony Burns MD   midodrine (PROAMATINE) 5 MG Tab Take 3 tablets (15 mg total) by mouth every 8 (eight) hours. 8/24/18 2/20/19 Yes  "Gigi Sprague MD   OMEGA-3 FATTY ACIDS-EPA ORAL Take by mouth.   Yes Historical Provider, MD   pen needle, diabetic (BD ULTRA-FINE CITLALI PEN NEEDLE) 32 gauge x 5/32" Ndle To use to inject insulin 4x daily 9/14/18  Yes Rita Rubin NP   rifAXIMin (XIFAXAN) 550 mg Tab Take 1 tablet (550 mg total) by mouth 2 (two) times daily. 8/27/18  Yes Rita Rubin NP   cholestyramine (QUESTRAN) 4 gram packet Take 1 packet (4 g total) by mouth 2 (two) times daily. 9/12/18 9/21/18 Yes Yony Burns MD   insulin aspart U-100 (NOVOLOG FLEXPEN U-100 INSULIN) 100 unit/mL InPn pen Inject 12 Units into the skin 3 (three) times daily with meals. Plus correction scale, max TDD 60 units daily 9/14/18 9/21/18 Yes Rita Rubin NP   insulin glargine (LANTUS) 100 unit/mL injection Inject 12 Units into the skin every evening. 9/12/18 9/21/18 Yes Yony Burns MD   lidocaine-prilocaine (EMLA) cream Apply topically as needed. 8/31/18 9/21/18 Yes Cristobal Marmolejo Jr., MD   megestrol (MEGACE) 20 MG Tab Take 1 tablet (20 mg total) by mouth once daily. 9/10/18 9/21/18 Yes Rita Rubin NP   sodium bicarbonate 650 MG tablet Take 2 tablets (1,300 mg total) by mouth 2 (two) times daily. 8/24/18 9/21/18 Yes Gigi Sprague MD   cefpodoxime (VANTIN) 200 MG tablet Take 1 tablet (200 mg total) by mouth 2 (two) times daily. for 7 days 9/21/18 9/28/18  Gigi Sprague MD   insulin aspart U-100 (NOVOLOG FLEXPEN U-100 INSULIN) 100 unit/mL InPn pen Inject 17 Units into the skin 3 (three) times daily with meals. Plus correction scale, max TDD 60 units daily 9/21/18   Gigi Sprague MD   insulin glargine (LANTUS) 100 unit/mL injection Inject 18 Units into the skin every evening. 9/21/18   Gigi Sprague MD   sodium bicarbonate 650 MG tablet Take 2 tablets (1,300 mg total) by mouth 3 (three) times daily. 9/21/18   Gigi Sprague MD   insulin aspart U-100 (NOVOLOG FLEXPEN U-100 INSULIN) 100 unit/mL InPn pen Inject 19-23 Units into the skin 3 (three) " times daily with meals. Plus correction scale, max TDD 70 units daily 9/21/18 9/21/18  Gigi Sprague MD     Scheduled Meds:    cefTRIAXone (ROCEPHIN) IVPB  1 g Intravenous Q24H    docusate sodium  100 mg Oral BID    entecavir  0.5 mg Oral Daily    heparin (porcine)  5,000 Units Subcutaneous Q12H    insulin aspart U-100  17 Units Subcutaneous TIDWM    insulin detemir U-100  18 Units Subcutaneous QHS    lactulose  20 g Oral QID    midodrine  15 mg Oral Q8H    rifAXIMin  550 mg Oral BID    sodium bicarbonate  1,300 mg Oral TID     Continuous Infusions:   PRN Meds:dextrose 50%, dextrose 50%, glucagon (human recombinant), glucose, glucose, insulin aspart U-100, lidocaine-prilocaine, sodium chloride 0.9%, traMADol  Anticoagulants/Antiplatelets: Heparin - prophylactic    Allergies: Review of patient's allergies indicates:  No Known Allergies  Sedation Hx: have not been any systemic reactions    Labs:  Recent Labs   Lab  09/21/18   0458   INR  1.2       Recent Labs   Lab  09/21/18   0458   WBC  3.32*   HGB  8.4*   HCT  24.1*   MCV  93   PLT  61*      Recent Labs   Lab  09/20/18   0008   09/21/18   0458   GLU  220*   < >  155*   NA  124*   < >  128*   K  4.0   < >  3.9   CL  90*   < >  92*   CO2  21*   < >  23   BUN  79*   < >  78*   CREATININE  1.5*   < >  1.1   CALCIUM  9.1   < >  9.3   MG   --    < >  2.4   ALT  43   < >  40   AST  97*   < >  74*   ALBUMIN  3.9  3.9   < >  4.1   BILITOT  1.6*   < >  1.4*  1.4*   BILIDIR  0.6*   --    --     < > = values in this interval not displayed.         Vitals:  Temp: 97.9 °F (36.6 °C) (09/21/18 0800)  Pulse: 97 (09/21/18 1435)  Resp: 16 (09/21/18 1435)  BP: (!) 98/51 (09/21/18 1435)  SpO2: 97 % (09/21/18 1435)     Physical Exam:  ASA: 2  Mallampati: 2    General: no acute distress  Mental Status: alert and oriented to person, place and time  HEENT: normocephalic, atraumatic  Chest: unlabored breathing  Abdomen: distended  Extremity: moves all extremities    Plan:  US-guided paracentesis  Sedation Plan: Local    Ciro Love M.D.  PGY-3 Radiology

## 2018-09-21 NOTE — PROGRESS NOTES
Discharge Note:     SW met with patient and Lithuanian  Dunia ([phone]) at bedside to discuss dc plans.   Pts daughter and  away from the room this am.   Pt presents alert and oriented x 4, quiet but communicative with good eye contact.   Pt is in agreement with dc plans for today if all is good with her para testing.   Pt will dc back to [facility] with her  Andrés Ramirez, phone [phone] who only speaks Faroese, some Lithuanian and her daughter Angela, phone [phone] who only speaks Lithuanian.   Pt speaks both Faroese and Lithuanian and understands some English.   Patient and family report still that patient's other daughter Susan is arriving in [place] on 9/27/18.   Pt reports difficulty coping/adjusting to the wait time for transplant, being away from her country and the differences in healthcare treatment at home and here.   Pt reports depression and anxiety.   Pt denies wanting to harm herself or anyone else.   Pt is asking for counseling services in outpatient psychiatry.   TIMBO contacted pts Ins , Diane Willett via email today in regards to services for the patient here.   She will need to check with pts insurance co in [facility], but they are on Holiday and she will not here back from them until the earliest October 2nd.      TIMBO did suggest that patient reach out to the local [facility] for now, until we are able to have her set up with mental health services locally.   Pt agreeable to that and feels comfortable speaking to the [facility] for now.     Pt denies any other psychosocial needs at this time.   Pt aware to speak with International Dept in order to reach transplant SW team for any psychosocial concerns.   SW remains available for all transplant resources, education and psychosocial support.

## 2018-09-21 NOTE — PROGRESS NOTES
Paracentesis complete. Pt tolerated well. Vital signs stable. Labs collected and sent to laboratory. 2800ml collected from [facility] paracentesis site. Dermabond and dressing apply. Pt transferred back to room on stretcher with transport in the company of daughter and .

## 2018-09-21 NOTE — PROGRESS NOTES
Katie Sanchez MD notified pt refusing scheduled insulin of 21 units and correction of 6 units for blood sugar check of 288 at lunchtime. Pt refusing and requesting to talk to endocrinologist.      MD at pts bedside to discuss plan.  Orders from NP to go ahead at give 17 units scheduled mealtime insulin and correction scale of 6 units for total of 23 units with lunch.    MD discussed with family about this plan extensively for over 45 minutes.  Understanding verbalized by family and agreement in place.   used throughout this process.    23 units insulin given once meal at pts bedside.  No issues. Will monitor.

## 2018-09-21 NOTE — SUBJECTIVE & OBJECTIVE
"Interval HPI:   Overnight events:  Patient and family upset with plan of care (amount of insulin ordered).  Patient is refusing insulin administration citing that the amount is too much.    Intermittent abdominal pain.  Spoke with daughter and  Ms. Adames  Extensively (>45min)    Eating:   tolerates full kocher diet, but amount with each meal varies  Nausea: No  Hypoglycemia and intervention: No  Fever: No  TPN and/or TF: No    /64   Pulse 108   Temp 97.9 °F (36.6 °C) (Oral)   Resp 14   Ht 5' 3.78" (1.62 m)   Wt 54.6 kg (120 lb 5.9 oz)   LMP  (LMP Unknown)   SpO2 100%   Breastfeeding? No   BMI 20.80 kg/m²     Labs Reviewed and Include    Recent Labs   Lab  09/21/18   0458   GLU  155*   CALCIUM  9.3   ALBUMIN  4.1   PROT  5.8*   NA  128*   K  3.9   CO2  23   CL  92*   BUN  78*   CREATININE  1.1   ALKPHOS  185*   ALT  40   AST  74*   BILITOT  1.4*  1.4*     Lab Results   Component Value Date    WBC 3.32 (L) 09/21/2018    HGB 8.4 (L) 09/21/2018    HCT 24.1 (L) 09/21/2018    MCV 93 09/21/2018    PLT 61 (L) 09/21/2018     No results for input(s): TSH, FREET4 in the last 168 hours.  Lab Results   Component Value Date    HGBA1C 6.8 (H) 08/16/2018       Nutritional status:   Body mass index is 20.8 kg/m².  Lab Results   Component Value Date    ALBUMIN 4.1 09/21/2018    ALBUMIN 4.4 09/20/2018    ALBUMIN 4.5 09/20/2018     Lab Results   Component Value Date    PREALBUMIN 12 (L) 09/17/2018       Estimated Creatinine Clearance: 41.3 mL/min (based on SCr of 1.1 mg/dL).    Accu-Checks  Recent Labs      09/19/18   1223  09/19/18   1701  09/19/18   2257  09/20/18   0202  09/20/18   0832  09/20/18   1048  09/20/18   1245  09/20/18   1821  09/21/18   0113  09/21/18   0742   POCTGLUCOSE  254*  133*  221*  222*  215*  302*  229*  151*  261*  182*       Current Medications and/or Treatments Impacting Glycemic Control  Immunotherapy:    Immunosuppressants     None        Steroids:   Hormones (From admission, " onward)    None        Pressors:    Autonomic Drugs (From admission, onward)    Start     Stop Route Frequency Ordered    09/17/18 1400  midodrine tablet 15 mg      -- Oral Every 8 hours 09/17/18 1302        Hyperglycemia/Diabetes Medications:   Antihyperglycemics (From admission, onward)    Start     Stop Route Frequency Ordered    09/21/18 2100  insulin detemir U-100 pen 18 Units      -- SubQ Nightly 09/21/18 1155    09/21/18 1645  insulin aspart U-100 pen 17 Units      -- SubQ 3 times daily with meals 09/21/18 1247    09/18/18 1828  insulin aspart U-100 pen 1-10 Units      -- SubQ Before meals & nightly PRN 09/18/18 3173

## 2018-09-21 NOTE — PROCEDURES
Radiology Post-Procedure Note    Pre Op Diagnosis: Ascites  Post Op Diagnosis: Same    Procedure: Paracentesis    Procedure performed by: Kareem Ballard MD and Ciro Love MD     Following informed consent, the patient underwent sterile preparation and 1% lidocaine for local anesthesia. Ultrasound guidance was used to insert a 5-Spanish Yueh catheter into the peritoneal space. Opaque yellow-colored fluid was successfully removed with no complications.    Written Informed Consent Obtained: Yes  Specimen Removed: YES; 20 cc's of ascites obtained for laboratory evaluation  Estimated Blood Loss: Minimal    Findings:   Successful paracentesis.  Albumin administered PRN per protocol.    Patient tolerated procedure well. Please see imaging report for further details.    Ciro Love MD

## 2018-09-21 NOTE — DISCHARGE SUMMARY
[facility]  Liver Transplant  Discharge Summary      Patient Name: Scarlett Reddy   MRN: [id number]  Admission Date: 9/17/2018  Hospital Length of Stay: 4 days  Discharge Date and Time:  09/21/2018 4:18 PM  Attending Physician: Gigi Sprague MD   Discharging Provider: Joselin Montes PA-C  Primary Care Provider: Primary Doctor No  HPI:   Scarlett Reddy is a 68 yr old female with PMH of decompensated cirrhosis due to Hep B c/b ascites requiring twice weekly paracentesis, hepatic encephalopathy, esophageal varices, and hyponatremia. She is listed for liver transplant with MELD 23. She presented to IR for paracentesis today (9/17) however was noted to be hyponatremic on labs (Na 122) and was sent to ED. In the ED she reported not feeling well this weekend, c/o diffuse chest discomfort which she attributes to anxiety about labs and weekly testing and has since resolved, and general fatigue. She and her daughter express concerns over elevated glucose also. C/o chronic nausea, no vomiting. Last paracentesis was 9/14 and she had 6.8L removed, , 22% segs, cultures NGTD. She is scheduled for outpatient paracentesis tomorrow 9/18, will reschedule for inpatient. She reports multiple BM's daily with lactulose, although not as frequent over the weekend. Na decreased on outpatient labs (122) but likely related to hyperglycemia (274). Diuretics have remained on hold for hyponatremia. In addition, patient has been non-compliant with low Na and fluid restricted diet, which she and her family report is d/t her poor appetitite. She will be admitted for monitoring of her sodium and glucose, 1L fluid restricted low sodium diet, albumin administration, urine studies, endocrine consult, and nutrition consult.       Hospital Course:    Admitted due to hyponatremia.  Placed on fluid restriction, low sodium diet, with some improvement.  Blood sugars were uncontrolled, >400.  Endocrine consulted and initiated insulin gtt.  Insulin gtt now d/c'd and subq insulin regimen adjusted. Sodium improving with controlling hyperglycemia. On day of discharge, sodium improved to 128.    Patient will d/c home with insulin recs per endocrinology with f/u in sugar clinic next week.    Paracentesis completed 9/18 with 5.1L removed, , Segs 89% suggestive of +SBP. Ceftriaxone initiated.  Repeat paracentesis on 9/21/18- with improvement,  and 4% segs. Cultures pending, please follow. Ceftriaxone transitioned to cefpodixome for which patient will complete a 10 day total course (stop date: 9/28).      She will follow up with outpatient labs and clinic on 9/24. She has outpatient paracentesis scheduled for 9/28/18.     Pt is stable and ready for discharge. She is ambulating well and tolerating diet. She has no complaints. She and her caregivers have met with PharmD and Transplant Coordinator and received education with Maori  present. They have expressed understanding of discharge instructions and importance of follow-up.       Final Active Diagnoses:    Diagnosis Date Noted POA    PRINCIPAL PROBLEM:  Spontaneous bacterial peritonitis [K65.2] 09/19/2018 Yes    Hyponatremia [E87.1]  Yes    Liver transplant candidate [Z76.82]  Not Applicable    Type 2 diabetes mellitus with diabetic polyneuropathy, with long-term current use of insulin [E11.42, Z79.4] 08/15/2018 Not Applicable    Hepatic encephalopathy [K72.90] 08/14/2018 Yes    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 [B18.0, K74.60] 08/15/2018 Yes    Hypokalemia [E87.6] 09/20/2018 Unknown    Ascites [R18.8] 08/30/2018 Yes    Weakness [R53.1] 08/30/2018 Unknown    Severe malnutrition [E43] 08/16/2018 Yes      Problems Resolved During this Admission:       Consults (From admission, onward)        Status Ordering Provider     Inpatient consult to Endocrinology  Once     Provider:  (Not yet assigned)    Completed ONEYDA HIRSCH     Inpatient consult to  Endocrinology  Once     Provider:  (Not yet assigned)    Completed JULIANNE ROCHE     Inpatient consult to Liver Transplant  Once     Provider:  (Not yet assigned)    Completed TEZ BAZAN     Inpatient consult to Registered Dietitian/Nutritionist  Once     Provider:  (Not yet assigned)    Completed JUDY HARDEN          Pending Diagnostic Studies:     Procedure Component Value Units Date/Time    IR Paracentesis with Imaging [[id number]] Resulted:  09/21/18 1431    Order Status:  Sent Lab Status:  In process Updated:  09/21/18 1431    Specimen:  Peritoneal Fluid     WBC & Diff,Body Fluid Peritoneal Fluid [[id number]] Collected:  09/21/18 1537    Order Status:  Sent Lab Status:  In process Updated:  09/21/18 1603    Specimen:  Body Fluid         Significant Diagnostic Studies: Labs:   CMP   Recent Labs   Lab  09/20/18   0008  09/20/18   0827  09/20/18   1119  09/20/18   1751  09/21/18   0458   NA  124*  125*  124*  124*  128*  128*   K  4.0  3.3*  3.2*  3.1*  3.9  3.9   CL  90*  90*  90*  87*  90*  92*   CO2  21*  23  22*  22*  27  23   GLU  220*  195*  193*  258*  128*  155*   BUN  79*  77*  77*  80*  81*  78*   CREATININE  1.5*  1.3  1.3  1.4  1.2  1.1   CALCIUM  9.1  9.9  9.6  9.7  9.6  9.3   PROT  6.3  6.6   --    --   5.8*   ALBUMIN  3.9  3.9  4.4  4.3  4.5  4.4  4.1   BILITOT  1.6*  2.0*  1.9*   --    --   1.4*  1.4*   ALKPHOS  172*  206*   --    --   185*   AST  97*  79*   --    --   74*   ALT  43  46*   --    --   40   ANIONGAP  13  12  12  15  11  13   ESTGFRAFRICA  40.7*  48.4*  48.4*  44.2*  53.3*  59.2*   EGFRNONAA  35.3*  42.0*  42.0*  38.4*  46.2*  51.3*   , CBC   Recent Labs   Lab  09/20/18   0827 09/21/18   0458   WBC  5.44  3.32*   HGB  10.4*  8.4*   HCT  30.0*  24.1*   PLT  86*  61*   , INR   Lab Results   Component Value Date    INR 1.2 09/21/2018    INR 1.1 09/20/2018    INR 1.3 (H) 09/19/2018    and All labs within the past 24 hours have been reviewed    The  "patients clinical status was discussed at multidisplinary rounds, involving transplant surgery, transplant medicine, pharmacy, nursing, nutrition, and social work    Discharged Condition: good    Disposition:     Follow Up:    Patient Instructions:   No discharge procedures on file.  Medications:  Reconciled Home Medications:      Medication List      START taking these medications    cefpodoxime 200 MG tablet  Commonly known as:  VANTIN  Take 1 tablet (200 mg total) by mouth 2 (two) times daily. for 7 days     insulin aspart U-100 100 unit/mL Inpn pen  Commonly known as:  NovoLOG Flexpen U-100 Insulin  Inject 17 Units into the skin 3 (three) times daily with meals. Plus correction scale, max TDD 60 units daily        CHANGE how you take these medications    insulin glargine 100 unit/mL injection  Commonly known as:  LANTUS  Inject 18 Units into the skin every evening.  What changed:  how much to take     sodium bicarbonate 650 MG tablet  Take 2 tablets (1,300 mg total) by mouth 3 (three) times daily.  What changed:  when to take this        CONTINUE taking these medications    ACCU-CHEK DC Strp  Generic drug:  blood sugar diagnostic  1 strip by Misc.(Non-Drug; Combo Route) route 3 (three) times daily.     BD ULTRA-FINE CITLALI PEN NEEDLE 32 gauge x 5/32" Ndle  Generic drug:  pen needle, diabetic  To use to inject insulin 4x daily     entecavir 0.5 MG Tab  Commonly known as:  BARACLUDE  Take 1 tablet (0.5 mg total) by mouth once daily.     lactulose 10 gram/15 mL solution  Commonly known as:  CHRONULAC  Take 30 mLs (20 g total) by mouth 3 (three) times daily. Adjust dose to have 4 bowel movements daily     midodrine 5 MG Tab  Commonly known as:  PROAMATINE  Take 3 tablets (15 mg total) by mouth every 8 (eight) hours.     OMEGA-3 FATTY ACIDS-EPA ORAL  Take by mouth.     VITAMIN D ORAL  Take by mouth.     XIFAXAN 550 mg Tab  Generic drug:  rifAXIMin  Take 1 tablet (550 mg total) by mouth 2 (two) times daily.      "   STOP taking these medications    cholestyramine 4 gram packet  Commonly known as:  QUESTRAN     lidocaine-prilocaine cream  Commonly known as:  EMLA     megestrol 20 MG Tab  Commonly known as:  MEGACE          Time spent caring for patient (Greater than 1/2 spent in direct face-to-face contact): > 30 minutes    Joselin Montes PA-C  Liver Transplant  [facility]

## 2018-09-21 NOTE — PLAN OF CARE
Problem: Patient Care Overview  Goal: Plan of Care Review  Outcome: Ongoing (interventions implemented as appropriate)  No acute events overnight. VSS. , ss coverage given. Paracentesis today. Fall precautions maintained. Bed wheels locked, bed in lowest position, upper SR up x 2, call light in reach, non-skid socks when OOB. Instructed pt to call for assistance as needed. Will cont to monitor.

## 2018-09-21 NOTE — ASSESSMENT & PLAN NOTE
Spoke with patient and family extensively about patient's BG and insulin regimen.     Family and patient are uncomfortable with changes made to insulin regimen.  Refused scheduled insulin and sliding scale citing that the amount was too much for patient despite previous excursions.     Discussed inpatient BG goals (140-180), insulin time of action, and concern for hypoglycemia.  Provided several options for BG control - including post prandial administration    Family decided on lower scheduled prandial with moderate correction sliding scale, irrespective of endocrine recommendations.     Current regimen:  Levemir 18u qHS  Novolog 17u AC  Moderate correction   POC AC/HS     Patient likely to be discharged today.  Okay to discharge on above regimen.  To follow up with PCP.

## 2018-09-21 NOTE — ASSESSMENT & PLAN NOTE
Managed per primary.  May impact insulin needs.   Liver disease can lead to decrease gluconeogenesis

## 2018-09-21 NOTE — PLAN OF CARE
Spoke with patient and family extensively about patient's BG and insulin regimen.    Family and patient are uncomfortable with changes made to insulin regimen.  Refused scheduled insulin and sliding scale citing that the amount was too much for patient despite previous excursions.    Discussed inpatient BG goals, insulin time of action, and concern for hypoglycemia.    Provided several options for BG control.    Family decided on lower scheduled prandial with moderate correction sliding scale, irrespective of endocrine recommendations.    Current regimen:  Levemir 18u qHS  Novolog 17u AC  Moderate correction   POC AC/HS    Patient likely to be discharged today.  Okay to discharge on above regimen.  To follow up with PCP.    Katie Sanchez MD  Endocrinology Fellow

## 2018-09-21 NOTE — PT/OT/SLP PROGRESS
Occupational Therapy      Patient Name:  Scarlett Reddy   MRN:  [id number]  [id number]  Patient not seen today secondary to going for US/IR. Will follow-up as schedule allows.    Jessie Zapata OT  9/21/2018

## 2018-09-22 ENCOUNTER — TELEPHONE (OUTPATIENT)
Dept: TRANSPLANT | Facility: CLINIC | Age: 68
End: 2018-09-22

## 2018-09-22 LAB
BACTERIA BLD CULT: NORMAL
BACTERIA BLD CULT: NORMAL
BACTERIA FLD AEROBE CULT: NO GROWTH
GRAM STN SPEC: NORMAL
GRAM STN SPEC: NORMAL

## 2018-09-22 NOTE — TELEPHONE ENCOUNTER
Called patient per PDOL protocol with  Katerin to see how patient was doing. She reported having diarrhea yesterday when asked if she was having bowel movements.  She reported leakage from her paracentesis site.  I advised it was normal, that she could get some sterile abd pads or use sanitary pads to cover it to keep clothes from getting soiled.  Use paper tape to help skin from tearing.  I told her the only think ER could do it put a stitch in it but when she gets next para they could do it also.  Her next para was set fo Friday, she was told Monday.  I told her to call her coordiwnator on Monday, she thought she would not need it until Wednesday.  She will also be seen in clinic on Tuesday.

## 2018-09-23 LAB — POCT GLUCOSE: 288 MG/DL (ref 70–110)

## 2018-09-24 ENCOUNTER — TELEPHONE (OUTPATIENT)
Dept: TRANSPLANT | Facility: CLINIC | Age: 68
End: 2018-09-24

## 2018-09-24 ENCOUNTER — HOSPITAL ENCOUNTER (OUTPATIENT)
Dept: RADIOLOGY | Facility: HOSPITAL | Age: 68
Discharge: HOME | DRG: 640 | End: 2018-09-24
Attending: INTERNAL MEDICINE
Payer: COMMERCIAL

## 2018-09-24 ENCOUNTER — HOSPITAL ENCOUNTER (INPATIENT)
Facility: HOSPITAL | Age: 68
LOS: 3 days | Discharge: HOME | DRG: 640 | End: 2018-09-27
Attending: TRANSPLANT SURGERY | Admitting: TRANSPLANT SURGERY
Payer: COMMERCIAL

## 2018-09-24 DIAGNOSIS — I95.9 HYPOTENSION, UNSPECIFIED HYPOTENSION TYPE: ICD-10-CM

## 2018-09-24 DIAGNOSIS — K76.82 HEPATIC ENCEPHALOPATHY: ICD-10-CM

## 2018-09-24 DIAGNOSIS — E11.42 TYPE 2 DIABETES MELLITUS WITH DIABETIC POLYNEUROPATHY, WITH LONG-TERM CURRENT USE OF INSULIN: ICD-10-CM

## 2018-09-24 DIAGNOSIS — R18.8 OTHER ASCITES: ICD-10-CM

## 2018-09-24 DIAGNOSIS — Z79.4 TYPE 2 DIABETES MELLITUS WITH DIABETIC POLYNEUROPATHY, WITH LONG-TERM CURRENT USE OF INSULIN: ICD-10-CM

## 2018-09-24 DIAGNOSIS — K74.60 CHRONIC HEPATITIS B WITH DELTA AGENT WITH CIRRHOSIS: ICD-10-CM

## 2018-09-24 DIAGNOSIS — E87.1 HYPONATREMIA: ICD-10-CM

## 2018-09-24 DIAGNOSIS — Z76.82 ORGAN TRANSPLANT CANDIDATE: ICD-10-CM

## 2018-09-24 DIAGNOSIS — B18.0 CHRONIC HEPATITIS B WITH DELTA AGENT WITH CIRRHOSIS: ICD-10-CM

## 2018-09-24 DIAGNOSIS — Z76.82 LIVER TRANSPLANT CANDIDATE: ICD-10-CM

## 2018-09-24 DIAGNOSIS — E43 SEVERE MALNUTRITION: ICD-10-CM

## 2018-09-24 LAB
BACTERIA FLD AEROBE CULT: NO GROWTH
GRAM STN SPEC: NORMAL
GRAM STN SPEC: NORMAL

## 2018-09-24 PROCEDURE — 93975 VASCULAR STUDY: CPT | Mod: TC,TXP

## 2018-09-24 PROCEDURE — 93975 VASCULAR STUDY: CPT | Mod: 26,NTX,, | Performed by: RADIOLOGY

## 2018-09-24 PROCEDURE — 63600175 PHARM REV CODE 636 W HCPCS: Mod: JG,NTX | Performed by: PHYSICIAN ASSISTANT

## 2018-09-24 PROCEDURE — 20600001 HC STEP DOWN PRIVATE ROOM: Mod: NTX

## 2018-09-24 PROCEDURE — 25000003 PHARM REV CODE 250: Mod: NTX | Performed by: PHYSICIAN ASSISTANT

## 2018-09-24 PROCEDURE — P9047 ALBUMIN (HUMAN), 25%, 50ML: HCPCS | Mod: JG,NTX | Performed by: PHYSICIAN ASSISTANT

## 2018-09-24 PROCEDURE — 99223 1ST HOSP IP/OBS HIGH 75: CPT | Mod: NTX,,, | Performed by: PHYSICIAN ASSISTANT

## 2018-09-24 RX ORDER — SODIUM BICARBONATE 650 MG/1
1300 TABLET ORAL 3 TIMES DAILY
Status: DISCONTINUED | OUTPATIENT
Start: 2018-09-24 | End: 2018-09-27 | Stop reason: HOSPADM

## 2018-09-24 RX ORDER — ONDANSETRON 8 MG/1
8 TABLET, ORALLY DISINTEGRATING ORAL EVERY 8 HOURS PRN
Status: DISCONTINUED | OUTPATIENT
Start: 2018-09-24 | End: 2018-09-27 | Stop reason: HOSPADM

## 2018-09-24 RX ORDER — ENTECAVIR 0.5 MG/1
0.5 TABLET, FILM COATED ORAL DAILY
Status: DISCONTINUED | OUTPATIENT
Start: 2018-09-25 | End: 2018-09-27 | Stop reason: HOSPADM

## 2018-09-24 RX ORDER — INSULIN ASPART 100 [IU]/ML
13 INJECTION, SOLUTION INTRAVENOUS; SUBCUTANEOUS
Status: DISCONTINUED | OUTPATIENT
Start: 2018-09-25 | End: 2018-09-25

## 2018-09-24 RX ORDER — CEFPODOXIME PROXETIL 200 MG/1
200 TABLET, FILM COATED ORAL 2 TIMES DAILY
Status: DISCONTINUED | OUTPATIENT
Start: 2018-09-24 | End: 2018-09-27 | Stop reason: HOSPADM

## 2018-09-24 RX ORDER — RAMELTEON 8 MG/1
8 TABLET ORAL NIGHTLY PRN
Status: DISCONTINUED | OUTPATIENT
Start: 2018-09-24 | End: 2018-09-27 | Stop reason: HOSPADM

## 2018-09-24 RX ORDER — MIDODRINE HYDROCHLORIDE 5 MG/1
15 TABLET ORAL EVERY 8 HOURS
Status: DISCONTINUED | OUTPATIENT
Start: 2018-09-24 | End: 2018-09-27 | Stop reason: HOSPADM

## 2018-09-24 RX ORDER — HEPARIN SODIUM 5000 [USP'U]/ML
5000 INJECTION, SOLUTION INTRAVENOUS; SUBCUTANEOUS EVERY 8 HOURS
Status: DISCONTINUED | OUTPATIENT
Start: 2018-09-24 | End: 2018-09-27 | Stop reason: HOSPADM

## 2018-09-24 RX ORDER — CEFPODOXIME PROXETIL 200 MG/1
200 TABLET, FILM COATED ORAL 2 TIMES DAILY
Status: DISCONTINUED | OUTPATIENT
Start: 2018-09-24 | End: 2018-09-24

## 2018-09-24 RX ORDER — SODIUM CHLORIDE 0.9 % (FLUSH) 0.9 %
3 SYRINGE (ML) INJECTION
Status: DISCONTINUED | OUTPATIENT
Start: 2018-09-24 | End: 2018-09-27 | Stop reason: HOSPADM

## 2018-09-24 RX ORDER — IBUPROFEN 200 MG
16 TABLET ORAL
Status: DISCONTINUED | OUTPATIENT
Start: 2018-09-24 | End: 2018-09-27 | Stop reason: HOSPADM

## 2018-09-24 RX ORDER — INSULIN ASPART 100 [IU]/ML
0-5 INJECTION, SOLUTION INTRAVENOUS; SUBCUTANEOUS
Status: DISCONTINUED | OUTPATIENT
Start: 2018-09-24 | End: 2018-09-27 | Stop reason: HOSPADM

## 2018-09-24 RX ORDER — GLUCAGON 1 MG
1 KIT INJECTION
Status: DISCONTINUED | OUTPATIENT
Start: 2018-09-24 | End: 2018-09-27 | Stop reason: HOSPADM

## 2018-09-24 RX ORDER — IBUPROFEN 200 MG
24 TABLET ORAL
Status: DISCONTINUED | OUTPATIENT
Start: 2018-09-24 | End: 2018-09-27 | Stop reason: HOSPADM

## 2018-09-24 RX ORDER — ALBUMIN HUMAN 250 G/1000ML
25 SOLUTION INTRAVENOUS ONCE
Status: COMPLETED | OUTPATIENT
Start: 2018-09-24 | End: 2018-09-24

## 2018-09-24 RX ADMIN — RIFAXIMIN 550 MG: 550 TABLET ORAL at 09:09

## 2018-09-24 RX ADMIN — HEPARIN SODIUM 5000 UNITS: 5000 INJECTION, SOLUTION INTRAVENOUS; SUBCUTANEOUS at 09:09

## 2018-09-24 RX ADMIN — INSULIN DETEMIR 10 UNITS: 100 INJECTION, SOLUTION SUBCUTANEOUS at 09:09

## 2018-09-24 RX ADMIN — MIDODRINE HYDROCHLORIDE 15 MG: 5 TABLET ORAL at 09:09

## 2018-09-24 RX ADMIN — SODIUM BICARBONATE 650 MG TABLET 1300 MG: at 09:09

## 2018-09-24 RX ADMIN — CEFPODOXIME PROXETIL 200 MG: 200 TABLET, FILM COATED ORAL at 09:09

## 2018-09-24 RX ADMIN — ALBUMIN HUMAN 25 G: 0.25 SOLUTION INTRAVENOUS at 09:09

## 2018-09-24 NOTE — ASSESSMENT & PLAN NOTE
- listed for oltx with MELD 24  - currently on hold 2/2 hyponatremia     - MELD-Na score: 23 at 9/24/2018  8:23 AM  MELD score: 12 at 9/24/2018  8:23 AM  Calculated from:  Serum Creatinine: 1.2 mg/dL at 9/24/2018  8:23 AM  Serum Sodium: 124 mmol/L (Rounded to 125 mmol/L) at 9/24/2018  8:23 AM  Total Bilirubin: 2 mg/dL at 9/24/2018  8:23 AM  INR(ratio): 1.1 at 9/24/2018  8:23 AM  Age: 68 years

## 2018-09-24 NOTE — PROGRESS NOTES
"Patient arrived to room. Assisted by  and "lakisha" at bedside.  patient and family do not speak english, Grenadian and Bengali only.  Vss. Notified ELIUD PERKINS notified of patient's arrival to room. Patient's abdomen distended; scheduled outpatient for para tomorrow.   "

## 2018-09-24 NOTE — HPI
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 She is listed for liver transplant with MELD 24. She has had multiple recent admission for hyponatremia. She is being directly admitted 9/24 for hyponatremia, sodium of 124. Labs otherwise stable. She has been getting twice weekly paracentesis. Last para was on 9/21/18 with , segs 4%, negative gram stain (improves from previous para on 9/18 with cell count of 772 with 89% segs). She is completing a 10 day course of cefpodoxime (stop date of 9/28). She admits to fatigue, intermittent nausea, decreased appetite with nausea (otherwise eating ok).  She denies fevers, chills, CP, SOB, diarrhea, constipation, changes in UOP or worsening edema. She will be admitted for management of hyponatremia with low sodium diet, fluid restriction of 1L, nephrology consult, and consideration of tolvaptan, and improvement of BG control.

## 2018-09-24 NOTE — TELEPHONE ENCOUNTER
Received secure chat message from Dr. Worrell. Patient with Na level of 124. Place on internal hold and admit to LTS.  Nephrology will need to be consulted.  He has already spoken with whomever is on service regarding this. Contacted admit and placed reservation.  Contacted Prudencio Bush with information and request for admit orders. Contacted International department to speak with patient via  to explain admission.

## 2018-09-24 NOTE — ASSESSMENT & PLAN NOTE
- multiple recent admissions for hyponatremia  - sodium 124 on admission  - low sodium diet  - fluid restriction 1L  - nephrology consult  - Albumin x 1 today

## 2018-09-24 NOTE — ASSESSMENT & PLAN NOTE
- patient abdomen soft, slightly distended  - last para 9/21 with 472 WBC, 4% segs, negative GS   - currently being treated for SBP noted on para from 9/18 with 772 WBC, 89% segs. Treating with cefpodoxime (stop date 9/28).  - plan for para on Wednesday 9/26

## 2018-09-25 ENCOUNTER — TELEPHONE (OUTPATIENT)
Dept: TRANSPLANT | Facility: CLINIC | Age: 68
End: 2018-09-25

## 2018-09-25 LAB
ALBUMIN SERPL BCP-MCNC: 3.9 G/DL
ALP SERPL-CCNC: 257 U/L
ALT SERPL W/O P-5'-P-CCNC: 66 U/L
ANION GAP SERPL CALC-SCNC: 11 MMOL/L
AST SERPL-CCNC: 96 U/L
BACTERIA SPEC ANAEROBE CULT: NORMAL
BASOPHILS # BLD AUTO: 0.01 K/UL
BASOPHILS NFR BLD: 0.3 %
BILIRUB SERPL-MCNC: 2 MG/DL
BUN SERPL-MCNC: 81 MG/DL
CALCIUM SERPL-MCNC: 9.4 MG/DL
CHLORIDE SERPL-SCNC: 89 MMOL/L
CO2 SERPL-SCNC: 23 MMOL/L
CREAT SERPL-MCNC: 1.3 MG/DL
DIFFERENTIAL METHOD: ABNORMAL
EOSINOPHIL # BLD AUTO: 0.1 K/UL
EOSINOPHIL NFR BLD: 1.5 %
ERYTHROCYTE [DISTWIDTH] IN BLOOD BY AUTOMATED COUNT: 15.9 %
EST. GFR  (AFRICAN AMERICAN): 48.4 ML/MIN/1.73 M^2
EST. GFR  (NON AFRICAN AMERICAN): 42 ML/MIN/1.73 M^2
GLUCOSE SERPL-MCNC: 290 MG/DL
HCT VFR BLD AUTO: 27 %
HGB BLD-MCNC: 9.1 G/DL
IMM GRANULOCYTES # BLD AUTO: 0.02 K/UL
IMM GRANULOCYTES NFR BLD AUTO: 0.6 %
INR PPP: 1.1
LYMPHOCYTES # BLD AUTO: 0.3 K/UL
LYMPHOCYTES NFR BLD: 9.3 %
MAGNESIUM SERPL-MCNC: 2.4 MG/DL
MCH RBC QN AUTO: 31.7 PG
MCHC RBC AUTO-ENTMCNC: 33.7 G/DL
MCV RBC AUTO: 94 FL
MONOCYTES # BLD AUTO: 0.3 K/UL
MONOCYTES NFR BLD: 9.9 %
NEUTROPHILS # BLD AUTO: 2.6 K/UL
NEUTROPHILS NFR BLD: 78.4 %
NRBC BLD-RTO: 0 /100 WBC
PLATELET # BLD AUTO: 79 K/UL
PMV BLD AUTO: 11.8 FL
POCT GLUCOSE: 164 MG/DL (ref 70–110)
POCT GLUCOSE: 372 MG/DL (ref 70–110)
POTASSIUM SERPL-SCNC: 3.5 MMOL/L
PROT SERPL-MCNC: 6.1 G/DL
PROTHROMBIN TIME: 11.3 SEC
RBC # BLD AUTO: 2.87 M/UL
SODIUM SERPL-SCNC: 123 MMOL/L
WBC # BLD AUTO: 3.33 K/UL

## 2018-09-25 PROCEDURE — 83735 ASSAY OF MAGNESIUM: CPT | Mod: NTX

## 2018-09-25 PROCEDURE — 80053 COMPREHEN METABOLIC PANEL: CPT | Mod: NTX

## 2018-09-25 PROCEDURE — 85610 PROTHROMBIN TIME: CPT | Mod: NTX

## 2018-09-25 PROCEDURE — 99233 SBSQ HOSP IP/OBS HIGH 50: CPT | Mod: NTX,,, | Performed by: NURSE PRACTITIONER

## 2018-09-25 PROCEDURE — 85025 COMPLETE CBC W/AUTO DIFF WBC: CPT | Mod: NTX

## 2018-09-25 PROCEDURE — 20600001 HC STEP DOWN PRIVATE ROOM: Mod: NTX

## 2018-09-25 PROCEDURE — 99223 1ST HOSP IP/OBS HIGH 75: CPT | Mod: NTX,,, | Performed by: NURSE PRACTITIONER

## 2018-09-25 PROCEDURE — 25000003 PHARM REV CODE 250: Mod: NTX | Performed by: PHYSICIAN ASSISTANT

## 2018-09-25 PROCEDURE — 63600175 PHARM REV CODE 636 W HCPCS: Mod: NTX | Performed by: NURSE PRACTITIONER

## 2018-09-25 PROCEDURE — 36415 COLL VENOUS BLD VENIPUNCTURE: CPT | Mod: NTX

## 2018-09-25 PROCEDURE — 25000003 PHARM REV CODE 250: Mod: NTX | Performed by: NURSE PRACTITIONER

## 2018-09-25 PROCEDURE — 63600175 PHARM REV CODE 636 W HCPCS: Mod: NTX | Performed by: PHYSICIAN ASSISTANT

## 2018-09-25 RX ORDER — INSULIN ASPART 100 [IU]/ML
17 INJECTION, SOLUTION INTRAVENOUS; SUBCUTANEOUS
Status: DISCONTINUED | OUTPATIENT
Start: 2018-09-25 | End: 2018-09-26

## 2018-09-25 RX ORDER — LACTULOSE 10 G/15ML
15 SOLUTION ORAL 3 TIMES DAILY
Status: DISCONTINUED | OUTPATIENT
Start: 2018-09-25 | End: 2018-09-27 | Stop reason: HOSPADM

## 2018-09-25 RX ORDER — INSULIN ASPART 100 [IU]/ML
2 INJECTION, SOLUTION INTRAVENOUS; SUBCUTANEOUS
Status: DISCONTINUED | OUTPATIENT
Start: 2018-09-25 | End: 2018-09-27 | Stop reason: HOSPADM

## 2018-09-25 RX ADMIN — DOCUSATE SODIUM 50 MG: 50 CAPSULE, LIQUID FILLED ORAL at 05:09

## 2018-09-25 RX ADMIN — LACTULOSE 15 G: 20 SOLUTION ORAL at 10:09

## 2018-09-25 RX ADMIN — INSULIN ASPART 0 UNITS: 100 INJECTION, SOLUTION INTRAVENOUS; SUBCUTANEOUS at 08:09

## 2018-09-25 RX ADMIN — HEPARIN SODIUM 5000 UNITS: 5000 INJECTION, SOLUTION INTRAVENOUS; SUBCUTANEOUS at 05:09

## 2018-09-25 RX ADMIN — SODIUM BICARBONATE 650 MG TABLET 1300 MG: at 08:09

## 2018-09-25 RX ADMIN — INSULIN ASPART 17 UNITS: 100 INJECTION, SOLUTION INTRAVENOUS; SUBCUTANEOUS at 05:09

## 2018-09-25 RX ADMIN — HEPARIN SODIUM 5000 UNITS: 5000 INJECTION, SOLUTION INTRAVENOUS; SUBCUTANEOUS at 09:09

## 2018-09-25 RX ADMIN — INSULIN ASPART 2 UNITS: 100 INJECTION, SOLUTION INTRAVENOUS; SUBCUTANEOUS at 05:09

## 2018-09-25 RX ADMIN — SODIUM BICARBONATE 650 MG TABLET 1300 MG: at 09:09

## 2018-09-25 RX ADMIN — LACTULOSE 15 G: 20 SOLUTION ORAL at 09:09

## 2018-09-25 RX ADMIN — RIFAXIMIN 550 MG: 550 TABLET ORAL at 09:09

## 2018-09-25 RX ADMIN — MIDODRINE HYDROCHLORIDE 15 MG: 5 TABLET ORAL at 05:09

## 2018-09-25 RX ADMIN — MIDODRINE HYDROCHLORIDE 15 MG: 5 TABLET ORAL at 09:09

## 2018-09-25 RX ADMIN — RIFAXIMIN 550 MG: 550 TABLET ORAL at 08:09

## 2018-09-25 RX ADMIN — MIDODRINE HYDROCHLORIDE 15 MG: 5 TABLET ORAL at 02:09

## 2018-09-25 RX ADMIN — HEPARIN SODIUM 5000 UNITS: 5000 INJECTION, SOLUTION INTRAVENOUS; SUBCUTANEOUS at 02:09

## 2018-09-25 RX ADMIN — SODIUM BICARBONATE 650 MG TABLET 1300 MG: at 02:09

## 2018-09-25 RX ADMIN — INSULIN ASPART 13 UNITS: 100 INJECTION, SOLUTION INTRAVENOUS; SUBCUTANEOUS at 12:09

## 2018-09-25 RX ADMIN — CEFPODOXIME PROXETIL 200 MG: 200 TABLET, FILM COATED ORAL at 08:09

## 2018-09-25 RX ADMIN — INSULIN ASPART 1 UNITS: 100 INJECTION, SOLUTION INTRAVENOUS; SUBCUTANEOUS at 02:09

## 2018-09-25 RX ADMIN — INSULIN ASPART 5 UNITS: 100 INJECTION, SOLUTION INTRAVENOUS; SUBCUTANEOUS at 05:09

## 2018-09-25 RX ADMIN — ENTECAVIR 0.5 MG: 0.5 TABLET ORAL at 08:09

## 2018-09-25 RX ADMIN — INSULIN DETEMIR 20 UNITS: 100 INJECTION, SOLUTION SUBCUTANEOUS at 09:09

## 2018-09-25 RX ADMIN — LACTULOSE 15 G: 20 SOLUTION ORAL at 02:09

## 2018-09-25 RX ADMIN — CEFPODOXIME PROXETIL 200 MG: 200 TABLET, FILM COATED ORAL at 09:09

## 2018-09-25 RX ADMIN — INSULIN ASPART 13 UNITS: 100 INJECTION, SOLUTION INTRAVENOUS; SUBCUTANEOUS at 08:09

## 2018-09-25 NOTE — SUBJECTIVE & OBJECTIVE
Interval HPI:   Overnight events: Admitted to TSU. BG at goal this AM but trending up throughout the day with decreased scheduled insulin doses.   Eatin%  Nausea: No  Hypoglycemia and intervention: No  Fever: No  TPN and/or TF: No    PMH, PSH, FH, SH  reviewed       Review of Systems   Constitutional: Negative for weight changes.  Eyes: Negative for visual disturbance.  Respiratory: Negative for cough.   Cardiovascular: Negative for chest pain.  Gastrointestinal: + for nausea.  Endocrine: Negative for polyuria, polydipsia.  Musculoskeletal: Negative for back pain.  Skin: Negative for rash.  Neurological: Negative for syncope.  Psychiatric/Behavioral: Negative for depression.    Current Medications and/or Treatments Impacting Glycemic Control  Immunotherapy:    Immunosuppressants     None        Steroids:   Hormones (From admission, onward)    None        Pressors:    Autonomic Drugs (From admission, onward)    Start     Stop Route Frequency Ordered    18 2200  midodrine tablet 15 mg      -- Oral Every 8 hours 18 1820        Hyperglycemia/Diabetes Medications:   Antihyperglycemics (From admission, onward)    Start     Stop Route Frequency Ordered    18 2100  insulin detemir U-100 pen 20 Units      -- SubQ Nightly 18 1659    18 1757  insulin aspart U-100 pen 2 Units      -- SubQ As needed (PRN) 18 1659    18 1700  insulin aspart U-100 pen 17 Units      -- SubQ 3 times daily with meals 18 1659    18 1920  insulin aspart U-100 pen 0-5 Units      -- SubQ Before meals & nightly PRN 18 1821             PHYSICAL EXAMINATION:  Vitals:    18 1615   BP: 106/68   Pulse: 95   Resp: 15   Temp:      Body mass index is 20.69 kg/m².    Physical Exam   Constitutional:  Well developed, well nourished, NAD.  ENT: External ears no masses with nose patent; normal hearing.   Neck:  Supple; trachea midline.  Cardiovascular: Normal heart sounds, no LE edema.      Lungs:  Normal effort; lungs anterior bilaterally clear to auscultation.  Abdomen:  Soft, no masses,  no hernias. Rounded/ full.   MS: No clubbing or cyanosis of nails noted; unable to assess gait.  Skin: No rashes, lesions, or ulcers; no nodules.  Psychiatric: Good judgement and insight; normal mood and affect.  Neurological: Cranial nerves are grossly intact.

## 2018-09-25 NOTE — TELEPHONE ENCOUNTER
PATIENT NAME: [facility] #: [id number]    There is no height or weight on file to calculate BMI.    Lab Results   Component Value Date    CREATININE 1.3 09/25/2018     (L) 09/25/2018    BILITOT 2.0 (H) 09/25/2018    ALBUMIN 3.9 09/25/2018    INR 1.1 09/25/2018     MELD 24    Encephalopathy: 1 - 2  Ascites: moderate  Dialysis: no     Re certification form sent to  09/25/2018 at 11:46 AM.    Recertification requestor: Charley Simms

## 2018-09-25 NOTE — SUBJECTIVE & OBJECTIVE
"Scheduled Meds:   cefpodoxime  200 mg Oral BID    entecavir  0.5 mg Oral Daily    heparin (porcine)  5,000 Units Subcutaneous Q8H    insulin aspart U-100  13 Units Subcutaneous TID WM    insulin detemir U-100  10 Units Subcutaneous QHS    lactulose  15 g Oral TID    midodrine  15 mg Oral Q8H    rifAXIMin  550 mg Oral BID    sodium bicarbonate  1,300 mg Oral TID     Continuous Infusions:  PRN Meds:dextrose 50%, dextrose 50%, glucagon (human recombinant), glucose, glucose, insulin aspart U-100, ondansetron, ramelteon, sodium chloride 0.9%    Review of Systems  Objective:     Vital Signs (Most Recent):  Temp: 97.5 °F (36.4 °C) (18 1154)  Pulse: 90 (18 1115)  Resp: 15 (18 1115)  BP: 103/66 (18 1115)  SpO2: 95 % (18 1115) Vital Signs (24h Range):  Temp:  [97.1 °F (36.2 °C)-98.9 °F (37.2 °C)] 97.5 °F (36.4 °C)  Pulse:  [] 90  Resp:  [11-15] 15  SpO2:  [95 %-99 %] 95 %  BP: ()/(53-66) 103/66     Weight: 54.3 kg (119 lb 11.4 oz)  Body mass index is 20.69 kg/m².    Intake/Output - Last 3 Shifts       700 -  -  0659 700 -  0659    P.O.  360     I.V. (mL/kg)  100 (1.8)     Total Intake(mL/kg)  460 (8.5)     Net  +460            Urine Occurrence  4 x     Stool Occurrence  2 x           Physical Exam    Laboratory:  Immunosuppressants     None        {Results:::"Labs within the past 24 hours have been reviewed."}    Diagnostic Results:  {Select Imagin}  "

## 2018-09-25 NOTE — HOSPITAL COURSE
Admitted due to hyponatremia.  Well known to liver service with multiple admits for hyponatremia.  Hyponatremia dropped to 123.  Reinforced education on fluid restriction.  Increased fluid restriction to 800cc per day with success.  Sodium improved to 127 this am.  Endocrine consulted for hyperglycemia management.  Will plan for paracentesis 9/26.  A new caregiver, abril daughter, to arrive tomorrow 9/27.  Plan for PDOL teaching and discharge.

## 2018-09-25 NOTE — SUBJECTIVE & OBJECTIVE
Scheduled Meds:   albumin human 25%  25 g Intravenous Once    cefpodoxime  200 mg Oral BID    [START ON 9/25/2018] entecavir  0.5 mg Oral Daily    heparin (porcine)  5,000 Units Subcutaneous Q8H    [START ON 9/25/2018] insulin aspart U-100  13 Units Subcutaneous TID WM    insulin detemir U-100  10 Units Subcutaneous QHS    midodrine  15 mg Oral Q8H    rifAXIMin  550 mg Oral BID    sodium bicarbonate  1,300 mg Oral TID     Continuous Infusions:  PRN Meds:dextrose 50%, dextrose 50%, glucagon (human recombinant), glucose, glucose, insulin aspart U-100, ondansetron, ramelteon, sodium chloride 0.9%    Review of Systems   Constitutional: Positive for activity change, appetite change and fatigue. Negative for chills, diaphoresis and fever.   Respiratory: Negative for cough and shortness of breath.    Cardiovascular: Positive for leg swelling. Negative for palpitations.   Gastrointestinal: Positive for abdominal distention and nausea. Negative for abdominal pain, constipation, diarrhea and vomiting.   Genitourinary: Negative for decreased urine volume and dysuria.   Musculoskeletal: Negative for arthralgias and back pain.   Neurological: Negative for tremors.   Psychiatric/Behavioral: Negative for confusion.     Objective:     Vital Signs (Most Recent):  Temp: 98.9 °F (37.2 °C) (09/24/18 1700)  Pulse: 108 (09/24/18 1700)  Resp: 14 (09/24/18 1700)  BP: (!) 112/53 (09/24/18 1700)  SpO2: 97 % (09/24/18 1700) Vital Signs (24h Range):  Temp:  [98.9 °F (37.2 °C)] 98.9 °F (37.2 °C)  Pulse:  [108] 108  Resp:  [14] 14  SpO2:  [97 %] 97 %  BP: (112)/(53) 112/53        Body mass index is 20.8 kg/m².    Intake/Output - Last 3 Shifts     None          Physical Exam   Constitutional: She is oriented to person, place, and time. She appears well-developed. No distress.   Temporal and distal extremity muscle wasting   Eyes: No scleral icterus.   Cardiovascular: Normal rate, regular rhythm and normal heart sounds.   Pulmonary/Chest:  Effort normal and breath sounds normal. She has no rales.   Abdominal: Soft. Bowel sounds are normal. She exhibits distension (ascites). There is no tenderness.   Musculoskeletal: She exhibits edema.   Neurological: She is alert and oriented to person, place, and time.   Skin: Skin is warm and dry. She is not diaphoretic.   Psychiatric: She has a normal mood and affect. Her behavior is normal. Judgment and thought content normal.   Nursing note and vitals reviewed.      Laboratory:  Immunosuppressants     None        CBC:   Recent Labs   Lab  09/24/18   0823   WBC  3.79*   RBC  2.93*   HGB  9.2*   HCT  27.6*   PLT  74*   MCV  94   MCH  31.4*   MCHC  33.3     CMP:   Recent Labs   Lab  09/24/18   0823   GLU  179*   CALCIUM  9.3   ALBUMIN  4.0   PROT  6.2   NA  124*   K  3.9   CO2  22*   CL  90*   BUN  75*   CREATININE  1.2   ALKPHOS  276*   ALT  69*   AST  105*   BILITOT  2.0*     Labs within the past 24 hours have been reviewed.    Diagnostic Results:  None

## 2018-09-25 NOTE — ASSESSMENT & PLAN NOTE
- listed for oltx with MELD 24  - currently on hold 2/2 hyponatremia     - MELD-Na score: 24 at 9/25/2018  9:55 AM  MELD score: 13 at 9/25/2018  9:55 AM  Calculated from:  Serum Creatinine: 1.3 mg/dL at 9/25/2018  9:55 AM  Serum Sodium: 123 mmol/L (Rounded to 125 mmol/L) at 9/25/2018  9:55 AM  Total Bilirubin: 2 mg/dL at 9/25/2018  9:55 AM  INR(ratio): 1.1 at 9/25/2018  9:54 AM  Age: 68 years

## 2018-09-25 NOTE — NURSING
md on call liver transplant notified pt's daughter requesting lactulose and to increase levemir dose to 18 units. md states since pt has had 5 bms today that they will restart lactulose in am. Also,they will let primary team evaluate insulin dose in the am.

## 2018-09-25 NOTE — HPI
Reason for Consult: Management of type 2 DM, Hyperglycemia     Surgical Procedure and Date: n/a     Diabetes diagnosis year: 2013    Home Diabetes Medications:  Lantus 18 units HS and novolog 17 units with meals plus correction scale (150-200 +1)  Lab Results   Component Value Date    HGBA1C 6.8 (H) 08/16/2018         How often checking glucose at home? 3-4   BG readings on regimen: 180-310  Hypoglycemia on the regimen? yes, none recently   Missed doses on regimen?  No    Diabetes Complications include:     Diabetic peripheral neuropathy     Complicating diabetes co morbidities:   CIRRHOSIS      HPI:   Patient is a 68 y.o. female with a diagnosis of type 2 DM on MDI. Also with decompensated cirrhosis due to hep c/b with ascites requiring paracentesis twice weeks, hepatic encephalopathy, esophageal varices, and hyponatremia. Patient has frequently been admitted recently with hyponatremia. Endocrinology consulted for BG/ DM management.

## 2018-09-25 NOTE — PLAN OF CARE
Problem: Patient Care Overview  Goal: Plan of Care Review  Outcome: Ongoing (interventions implemented as appropriate)  Patient AAO today, stand by assist with ambulation.  Patients is Kinyarwanda speaking, utilizing .  Patient denies pain.  Patients MELD 24. Na 123 today. BG elevated, endocrine consulted, meal and ss insulin given as well as long acting insulin. Paracentesis ordered for tomorrow.  Patient is receiving lactulose, 1 bm noted.  Patient is receiving kosher meals by preference.

## 2018-09-25 NOTE — CONSULTS
[facility]  Endocrinology  Diabetes Consult Note    Consult Requested by: Jani Theodore MD   Reason for admit: Hyponatremia    HISTORY OF PRESENT ILLNESS:  Reason for Consult: Management of type 2 DM, Hyperglycemia     Surgical Procedure and Date: n/a     Diabetes diagnosis year:     Home Diabetes Medications:  Lantus 18 units HS and novolog 17 units with meals plus correction scale (150-200 +1)  Lab Results   Component Value Date    HGBA1C 6.8 (H) 2018         How often checking glucose at home? 3-4   BG readings on regimen: 180-310  Hypoglycemia on the regimen? yes, none recently   Missed doses on regimen?  No    Diabetes Complications include:     Diabetic peripheral neuropathy     Complicating diabetes co morbidities:   CIRRHOSIS      HPI:   Patient is a 68 y.o. female with a diagnosis of type 2 DM on MDI. Also with decompensated cirrhosis due to hep c/b with ascites requiring paracentesis twice weeks, hepatic encephalopathy, esophageal varices, and hyponatremia. Patient has frequently been admitted recently with hyponatremia. Endocrinology consulted for BG/ DM management.           Interval HPI:   Overnight events: Admitted to TSU. BG at goal this AM but trending up throughout the day with decreased scheduled insulin doses.   Eatin%  Nausea: No  Hypoglycemia and intervention: No  Fever: No  TPN and/or TF: No    PMH, PSH, FH, SH  reviewed       Review of Systems   Constitutional: Negative for weight changes.  Eyes: Negative for visual disturbance.  Respiratory: Negative for cough.   Cardiovascular: Negative for chest pain.  Gastrointestinal: + for nausea.  Endocrine: Negative for polyuria, polydipsia.  Musculoskeletal: Negative for back pain.  Skin: Negative for rash.  Neurological: Negative for syncope.  Psychiatric/Behavioral: Negative for depression.    Current Medications and/or Treatments Impacting Glycemic Control  Immunotherapy:    Immunosuppressants     None         Steroids:   Hormones (From admission, onward)    None        Pressors:    Autonomic Drugs (From admission, onward)    Start     Stop Route Frequency Ordered    09/24/18 2200  midodrine tablet 15 mg      -- Oral Every 8 hours 09/24/18 1820        Hyperglycemia/Diabetes Medications:   Antihyperglycemics (From admission, onward)    Start     Stop Route Frequency Ordered    09/25/18 2100  insulin detemir U-100 pen 20 Units      -- SubQ Nightly 09/25/18 1659    09/25/18 1757  insulin aspart U-100 pen 2 Units      -- SubQ As needed (PRN) 09/25/18 1659 09/25/18 1700  insulin aspart U-100 pen 17 Units      -- SubQ 3 times daily with meals 09/25/18 1659 09/24/18 1920  insulin aspart U-100 pen 0-5 Units      -- SubQ Before meals & nightly PRN 09/24/18 1821             PHYSICAL EXAMINATION:  Vitals:    09/25/18 1615   BP: 106/68   Pulse: 95   Resp: 15   Temp:      Body mass index is 20.69 kg/m².    Physical Exam   Constitutional:  Well developed, well nourished, NAD.  ENT: External ears no masses with nose patent; normal hearing.   Neck:  Supple; trachea midline.  Cardiovascular: Normal heart sounds, no LE edema.     Lungs:  Normal effort; lungs anterior bilaterally clear to auscultation.  Abdomen:  Soft, no masses,  no hernias. Rounded/ full.   MS: No clubbing or cyanosis of nails noted; unable to assess gait.  Skin: No rashes, lesions, or ulcers; no nodules.  Psychiatric: Good judgement and insight; normal mood and affect.  Neurological: Cranial nerves are grossly intact.         Labs Reviewed and Include   Recent Labs   Lab  09/25/18   0955   GLU  290*   CALCIUM  9.4   ALBUMIN  3.9   PROT  6.1   NA  123*   K  3.5   CO2  23   CL  89*   BUN  81*   CREATININE  1.3   ALKPHOS  257*   ALT  66*   AST  96*   BILITOT  2.0*     Lab Results   Component Value Date    WBC 3.33 (L) 09/25/2018    HGB 9.1 (L) 09/25/2018    HCT 27.0 (L) 09/25/2018    MCV 94 09/25/2018    PLT 79 (L) 09/25/2018     No results for input(s): TSH,  FREET4 in the last 168 hours.  Lab Results   Component Value Date    HGBA1C 6.8 (H) 08/16/2018       Nutritional status:   Body mass index is 20.69 kg/m².  Lab Results   Component Value Date    ALBUMIN 3.9 09/25/2018    ALBUMIN 4.0 09/24/2018    ALBUMIN 4.1 09/21/2018     Lab Results   Component Value Date    PREALBUMIN 12 (L) 09/17/2018       Estimated Creatinine Clearance: 34.9 mL/min (based on SCr of 1.3 mg/dL).    Accu-Checks  Recent Labs      09/25/18   0823  09/25/18   1230   POCTGLUCOSE  164*  372*        ASSESSMENT and PLAN    * Hyponatremia    Managed per primary.           Type 2 diabetes mellitus with diabetic polyneuropathy, with long-term current use of insulin    BG goal 140-180    Patient and family very upset that patient's insulin doses always significantly decreased on admit and then BG trend up. BG running slightly elevated on home doses after last discharge. Discussed at length with patient and family with  at bedside: plan below discussed and what family and patient want at this time.     Levemir 20 units HS   Novolog 17 units with meals  novolog 2 units prn with meals when patient also drinking protein shake.   bg monitoring ac/hs and low dose correction scale.         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    Managed per primary.   On transplant list MELD 24.               Plan discussed with patient and family at bedside.     Aviva Caldera NP  Endocrinology  [facility]

## 2018-09-25 NOTE — ASSESSMENT & PLAN NOTE
BG goal 140-180    Patient and family very upset that patient's insulin doses always significantly decreased on admit and then BG trend up. BG running slightly elevated on home doses after last discharge. Discussed at length with patient and family with  at bedside: plan below discussed and what family and patient want at this time.     Levemir 20 units HS   Novolog 17 units with meals  novolog 2 units prn with meals when patient also drinking protein shake.   bg monitoring ac/hs and low dose correction scale.

## 2018-09-25 NOTE — H&P
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 She is listed for liver transplant with MELD 24. She has had multiple recent admission for hyponatremia. She is being directly admitted 9/24 for hyponatremia, sodium of 124. Labs otherwise stable. She has been getting twice weekly paracentesis. Last para was on 9/21/18 with , segs 4%, negative gram stain (improves from previous para on 9/18 with cell count of 772 with 89% segs). She is completing a 10 day course of cefpodoxime (stop date of 9/28). She admits to fatigue, intermittent nausea, decreased appetite with nausea (otherwise eating ok).  She denies fevers, chills, CP, SOB, diarrhea, constipation, changes in UOP or worsening edema. She will be admitted for management of hyponatremia with low sodium diet, fluid restriction of 1L, nephrology consult, and consideration of tolvaptan, and improvement of BG control.     Scheduled Meds:   albumin human 25%  25 g Intravenous Once    cefpodoxime  200 mg Oral BID    [START ON 9/25/2018] entecavir  0.5 mg Oral Daily    heparin (porcine)  5,000 Units Subcutaneous Q8H    [START ON 9/25/2018] insulin aspart U-100  13 Units Subcutaneous TID WM    insulin detemir U-100  10 Units Subcutaneous QHS    midodrine  15 mg Oral Q8H    rifAXIMin  550 mg Oral BID    sodium bicarbonate  1,300 mg Oral TID     Continuous Infusions:  PRN Meds:dextrose 50%, dextrose 50%, glucagon (human recombinant), glucose, glucose, insulin aspart U-100, ondansetron, ramelteon, sodium chloride 0.9%    Review of Systems   Constitutional: Positive  for activity change, appetite change and fatigue. Negative for chills, diaphoresis and fever.   Respiratory: Negative for cough and shortness of breath.    Cardiovascular: Positive for leg swelling. Negative for palpitations.   Gastrointestinal: Positive for abdominal distention and nausea. Negative for abdominal pain, constipation, diarrhea and vomiting.   Genitourinary: Negative for decreased urine volume and dysuria.   Musculoskeletal: Negative for arthralgias and back pain.   Neurological: Negative for tremors.   Psychiatric/Behavioral: Negative for confusion.     Objective:     Vital Signs (Most Recent):  Temp: 98.9 °F (37.2 °C) (09/24/18 1700)  Pulse: 108 (09/24/18 1700)  Resp: 14 (09/24/18 1700)  BP: (!) 112/53 (09/24/18 1700)  SpO2: 97 % (09/24/18 1700) Vital Signs (24h Range):  Temp:  [98.9 °F (37.2 °C)] 98.9 °F (37.2 °C)  Pulse:  [108] 108  Resp:  [14] 14  SpO2:  [97 %] 97 %  BP: (112)/(53) 112/53        Body mass index is 20.8 kg/m².    Intake/Output - Last 3 Shifts     None          Physical Exam   Constitutional: She is oriented to person, place, and time. She appears well-developed. No distress.   Temporal and distal extremity muscle wasting   Eyes: No scleral icterus.   Cardiovascular: Normal rate, regular rhythm and normal heart sounds.   Pulmonary/Chest: Effort normal and breath sounds normal. She has no rales.   Abdominal: Soft. Bowel sounds are normal. She exhibits distension (ascites). There is no tenderness.   Musculoskeletal: She exhibits edema.   Neurological: She is alert and oriented to person, place, and time.   Skin: Skin is warm and dry. She is not diaphoretic.   Psychiatric: She has a normal mood and affect. Her behavior is normal. Judgment and thought content normal.   Nursing note and vitals reviewed.      Laboratory:  Immunosuppressants     None        CBC:   Recent Labs   Lab  09/24/18   0823   WBC  3.79*   RBC  2.93*   HGB  9.2*   HCT  27.6*   PLT  74*   MCV  94   MCH  31.4*   MCHC   33.3     CMP:   Recent Labs   Lab  09/24/18   0823   GLU  179*   CALCIUM  9.3   ALBUMIN  4.0   PROT  6.2   NA  124*   K  3.9   CO2  22*   CL  90*   BUN  75*   CREATININE  1.2   ALKPHOS  276*   ALT  69*   AST  105*   BILITOT  2.0*     Labs within the past 24 hours have been reviewed.    Diagnostic Results:  None    Assessment/Plan:     * Hyponatremia    - multiple recent admissions for hyponatremia  - sodium 124 on admission  - low sodium diet  - fluid restriction 1L  - nephrology consult  - Albumin x 1 today        Ascites    - patient abdomen soft, slightly distended  - last para 9/21 with 472 WBC, 4% segs, negative GS   - currently being treated for SBP noted on para from 9/18 with 772 WBC, 89% segs. Treating with cefpodoxime (stop date 9/28).  - plan for para on Wednesday 9/26        Hypotension    - BP stably low  - continue midodrine  - monitor        Severe malnutrition    - Protein powder ordered  - Prehab protocol as outpt- on hold for hyponatremia + nausea          Type 2 diabetes mellitus with diabetic polyneuropathy, with long-term current use of insulin    - restart home insulin regimen  - SSI  - endocrine consult         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    - listed for oltx  - continue entecavir        Liver transplant candidate    - listed for oltx with MELD 24  - currently on hold 2/2 hyponatremia     - MELD-Na score: 23 at 9/24/2018  8:23 AM  MELD score: 12 at 9/24/2018  8:23 AM  Calculated from:  Serum Creatinine: 1.2 mg/dL at 9/24/2018  8:23 AM  Serum Sodium: 124 mmol/L (Rounded to 125 mmol/L) at 9/24/2018  8:23 AM  Total Bilirubin: 2 mg/dL at 9/24/2018  8:23 AM  INR(ratio): 1.1 at 9/24/2018  8:23 AM  Age: 68 years          Hepatic encephalopathy    - patient alert and oriented  - continue lactulose and rifaxamin              MELD-Na score: 23 at 9/24/2018  8:23 AM  MELD score: 12 at 9/24/2018  8:23 AM  Calculated from:  Serum Creatinine: 1.2 mg/dL at 9/24/2018  8:23 AM  Serum  Sodium: 124 mmol/L (Rounded to 125 mmol/L) at 9/24/2018  8:23 AM  Total Bilirubin: 2 mg/dL at 9/24/2018  8:23 AM  INR(ratio): 1.1 at 9/24/2018  8:23 AM  Age: 68 years        Prudencio Bush PA-C  Liver Transplant  [facility]

## 2018-09-25 NOTE — SUBJECTIVE & OBJECTIVE
Scheduled Meds:   cefpodoxime  200 mg Oral BID    entecavir  0.5 mg Oral Daily    heparin (porcine)  5,000 Units Subcutaneous Q8H    insulin aspart U-100  13 Units Subcutaneous TID WM    insulin detemir U-100  10 Units Subcutaneous QHS    lactulose  15 g Oral TID    midodrine  15 mg Oral Q8H    rifAXIMin  550 mg Oral BID    sodium bicarbonate  1,300 mg Oral TID     Continuous Infusions:  PRN Meds:dextrose 50%, dextrose 50%, glucagon (human recombinant), glucose, glucose, insulin aspart U-100, ondansetron, ramelteon, sodium chloride 0.9%    Review of Systems   Constitutional: Positive for activity change, appetite change and fatigue. Negative for chills, diaphoresis and fever.   Respiratory: Negative for cough and shortness of breath.    Cardiovascular: Positive for leg swelling. Negative for palpitations.   Gastrointestinal: Positive for abdominal distention and nausea. Negative for abdominal pain, constipation, diarrhea and vomiting.   Genitourinary: Negative for decreased urine volume and dysuria.   Musculoskeletal: Negative for arthralgias and back pain.   Neurological: Negative for tremors.   Psychiatric/Behavioral: Negative for confusion.     Objective:     Vital Signs (Most Recent):  Temp: 97.5 °F (36.4 °C) (09/25/18 1154)  Pulse: 90 (09/25/18 1115)  Resp: 15 (09/25/18 1115)  BP: 103/66 (09/25/18 1115)  SpO2: 95 % (09/25/18 1115) Vital Signs (24h Range):  Temp:  [97.1 °F (36.2 °C)-98.9 °F (37.2 °C)] 97.5 °F (36.4 °C)  Pulse:  [] 90  Resp:  [11-15] 15  SpO2:  [95 %-99 %] 95 %  BP: ()/(53-66) 103/66     Weight: 54.3 kg (119 lb 11.4 oz)  Body mass index is 20.69 kg/m².    Intake/Output - Last 3 Shifts       09/23 0700 - 09/24 0659 09/24 0700 - 09/25 0659 09/25 0700 - 09/26 0659    P.O.  360     I.V. (mL/kg)  100 (1.8)     Total Intake(mL/kg)  460 (8.5)     Net  +460            Urine Occurrence  4 x     Stool Occurrence  2 x           Physical Exam   Constitutional: She is oriented to person,  place, and time. She appears well-developed. No distress.   Temporal and distal extremity muscle wasting   Eyes: No scleral icterus.   Cardiovascular: Normal rate, regular rhythm and normal heart sounds.   Pulmonary/Chest: Effort normal and breath sounds normal. She has no rales.   Abdominal: Soft. Bowel sounds are normal. She exhibits distension (ascites). There is no tenderness.   Musculoskeletal: She exhibits edema.   Neurological: She is alert and oriented to person, place, and time.   Skin: Skin is warm and dry. She is not diaphoretic.   Psychiatric: She has a normal mood and affect. Her behavior is normal. Judgment and thought content normal.   Nursing note and vitals reviewed.      Laboratory:  Immunosuppressants     None        CBC:   Recent Labs   Lab  09/25/18   0954   WBC  3.33*   RBC  2.87*   HGB  9.1*   HCT  27.0*   PLT  79*   MCV  94   MCH  31.7*   MCHC  33.7     CMP:   Recent Labs   Lab  09/25/18   0955   GLU  290*   CALCIUM  9.4   ALBUMIN  3.9   PROT  6.1   NA  123*   K  3.5   CO2  23   CL  89*   BUN  81*   CREATININE  1.3   ALKPHOS  257*   ALT  66*   AST  96*   BILITOT  2.0*     Labs within the past 24 hours have been reviewed.    Diagnostic Results:  None

## 2018-09-25 NOTE — PROGRESS NOTES
Admit Note     Met with patient, pt's , pt's daughter Angela and Greek  Olga of the [employer] to assess patients needs due to the pt's inability to speak/understand English.  Patient is a 68 y.o.  female, admitted hyponatremia.     Patient admitted from the emergency room on 9/24/2018 .  At this time, patient presents as alert and oriented x 4, pleasant, well groomed, calm and cooperative.  At this time, patients caregiver presents as alert and oriented x 4, pleasant, good eye contact, well groomed, recall good, concentration/judgement good, average intelligence, calm, communicative and cooperative.      Household/Family Systems (as reported by patients caregiver)     Patient resides with patient's , at [address].  While awaiting transplant, pt and her family are residing at the [facility], Room [address]. Support system includes , Andrés (primary language is Martiniquais; however, he does understand and speak Greek), and daughter Angela (speaks/understands minimal English; however, prefers Greek).  Patient does not have dependents that are need of being cared for.     Patients primary caregiver is Andrés Reddy, patients , phone number [phone].  Confirmed patients contact information is [phone] (home);   Telephone Information:   Mobile [phone]   .    During admission, patient's caregiver plans to stay at the [facility].  Confirmed patient and patients caregivers do have access to reliable transportation.    Cognitive Status/Learning     Patients caregiver reports patients reading ability as college and states patient does have difficulty with weakness and does not speak/understand English.  Patients caregiver reports patient learns best by hands on information with assistance from her daughter.   Needed: Yes; primary language is  Greek.  Patient's caregiver informed of   services available and how to access services..   Highest education level: Attended College/Technical School    Vocation/Disability (as reported by patients caregiver)    Working for Income: No  If no, reason not working: Patient Choice - Retired  Patient is retired from being a .    Adherence     Patients caregiver reports patient has a high level of adherence to patients health care regimen.  Adherence counseling and education provided.  Patient's caregiver verbalizes understanding.    Substance Use    Patients caregiver reports patients substance usage as the following:    Tobacco: none, patient denies any use.  Alcohol: none, patient denies any use.  Illicit Drugs/Non-prescribed Medications: none, patient denies any use.  Patients caregiver states clear understanding of the potential impact of substance use.  Substance abstinence/cessation counseling, education and resources provided and reviewed.     Services Utilizing/ADLS (as reported by patients caregiver)    Infusion Service: Prior to admission, patient utilizing? no  Home Health: Prior to admission, patient utilizing? no  DME: Prior to admission, yes; walker and w/c (borrowed from the [employer] [facility]  Pulmonary/Cardiac Rehab: Prior to admission, no  Dialysis:  Prior to admission, no  Transplant Specialty Pharmacy:  Prior to admission, no.    Prior to admission, patients caregiver reports patient was independent (however, requiring monitoring while showering due to weakness at times) with ADLS and was not driving.  Patients caregiver reports patient is not able to care for self at this time due to compromised medical condition (as documented in medical record) and physical weakness..  Patients caregiver reports patient indicates a willingness to care for self once medically cleared to do so.    Insurance/Medications    Insured by   Payor/Plan Subscr  Sex Relation Sub. Ins. ID Effective Group Num   1. [facility]  MERCEDES PATHAK 1950 Female  [id number] 8/3/18                                    [id number] [address] [facility] [id number]      Primary Insurance (for UNOS reporting): Private Insurance-[facility]  Secondary Insurance (for UNOS reporting): None    Patients caregiver reports patient is able to obtain and afford medications at this time and at time of discharge.    Living Will/Healthcare Power of     Patients caregiver reports patient does not have a LW and/or HCPA.   provided education regarding LW and HCPA and the completion of forms.    Coping/Mental Health (as reported by patients caregiver)    Patient is not coping well. Patient reported she is experiencing anxiety and depression with regards to awaiting transplant and being away from home ([place]).  TIMBO Hutchinson has been in communications with pt's [facility] Clinical Manager Diane Willett ([phone]) with regards to availability of counseling services.  While awaiting a response from pt's insurance, Diane inquired if the pt could meet with a local Rabbi for emotional support.  As per Katerin (a representative within [facility] [employer] [facility] the local Rabbi will not be able to travel until possibly tomorrow due to being in the middle of the Church holiday season; however, magali Gibbons was contacted by TIMBO Hutchinson who volunteered to meet with the pt while awaiting the Rabbi's availability.  TIMBO inquired if the pt would be in acceptance  of meeting with magali Gibbons and the pt advised she would be.       Discharge Planning (as reported by patients caregiver)    At time of discharge, patient plans to return to [facility] under the care of her  and daughter.  Patients  and daughter will transport patient.  Per rounds today, expected discharge date has not been medically determined at this time. Patients caretaker verbalizes understanding and is involved in treatment planning and discharge process.    Additional Concerns    Patient's  caretaker denies additional needs and/or concerns at this time. Patient is being followed for needs, education, resources, information, emotional support, supportive counseling, and for supportive and skilled discharge plan of care.  providing ongoing psychosocial support, education, resources and d/c planning as needed.  SW remains available.  provided resource list, patient choice, psychosocial and supportive counseling, resources, education, assistance and discharge planning with patient and caregiver involvement, ongoing SW availability and services as appropriate.  remains available. Patient's caregiver verbalizes understanding and agreement with information reviewed,  availability and how to access available resources as needed. Patient denies additional needs and/or concerns at this time. Patient verbalizes understanding and agreement with information reviewed, social work availability, and how to access available resources as needed. Patient's daughter Angela will be leaving the [place] on tomorrow returning to [facility] and her daughter Susan Ramirez will be arriving on 9/27/18 to assist with the patient's care.

## 2018-09-25 NOTE — ASSESSMENT & PLAN NOTE
- multiple recent admissions for hyponatremia  - sodium 124 on admission  - low sodium diet  - fluid restriction was 1L, now 800cc  - nephrology consulted

## 2018-09-25 NOTE — PROGRESS NOTES
[facility]  Liver Transplant  Progress Note    Patient Name: Scarlett Reddy  MRN: [id number]  Admission Date: 9/24/2018  Hospital Length of Stay: 1 days  Code Status: Full Code  Primary Care Provider: Primary Doctor No  Post-Operative Day:     ORGAN:     Disease Etiology: Cirrhosis: Type B and D  Donor Type:     CDC High Risk:     Donor CMV Status:   Donor CMV Status:   Donor HBcAB:     Donor HCV Status:     Donor HBV SETH: Organ record is missing.  Donor HCV SETH: Organ record is missing.  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 She is listed for liver transplant with MELD 24. She has had multiple recent admission for hyponatremia. She is being directly admitted 9/24 for hyponatremia, sodium of 124. Labs otherwise stable. She has been getting twice weekly paracentesis. Last para was on 9/21/18 with , segs 4%, negative gram stain (improves from previous para on 9/18 with cell count of 772 with 89% segs). She is completing a 10 day course of cefpodoxime (stop date of 9/28). She admits to fatigue, intermittent nausea, decreased appetite with nausea (otherwise eating ok).  She denies fevers, chills, CP, SOB, diarrhea, constipation, changes in UOP or worsening edema. She will be admitted for management of hyponatremia with low sodium diet, fluid restriction of 1L, nephrology consult, and consideration of tolvaptan, and improvement of BG control.     Hospital Course:  Admitted due to hyponatremia.  Known well to liver service with multiple admits for hyponatremia.  Hyponatremia dropped again this am to 123.  Reinforced education on fluid restriction.  Increased fluid restriction to 800cc per day.  Endocrine consulted for hyperglycemia  "management.  Scheduled for paracentesis outpatient today, however patient "not feeling well."  Will plan for paracentesis in am.      Scheduled Meds:   cefpodoxime  200 mg Oral BID    entecavir  0.5 mg Oral Daily    heparin (porcine)  5,000 Units Subcutaneous Q8H    insulin aspart U-100  13 Units Subcutaneous TID WM    insulin detemir U-100  10 Units Subcutaneous QHS    lactulose  15 g Oral TID    midodrine  15 mg Oral Q8H    rifAXIMin  550 mg Oral BID    sodium bicarbonate  1,300 mg Oral TID     Continuous Infusions:  PRN Meds:dextrose 50%, dextrose 50%, glucagon (human recombinant), glucose, glucose, insulin aspart U-100, ondansetron, ramelteon, sodium chloride 0.9%    Review of Systems   Constitutional: Positive for activity change, appetite change and fatigue. Negative for chills, diaphoresis and fever.   Respiratory: Negative for cough and shortness of breath.    Cardiovascular: Positive for leg swelling. Negative for palpitations.   Gastrointestinal: Positive for abdominal distention and nausea. Negative for abdominal pain, constipation, diarrhea and vomiting.   Genitourinary: Negative for decreased urine volume and dysuria.   Musculoskeletal: Negative for arthralgias and back pain.   Neurological: Negative for tremors.   Psychiatric/Behavioral: Negative for confusion.     Objective:     Vital Signs (Most Recent):  Temp: 97.5 °F (36.4 °C) (09/25/18 1154)  Pulse: 90 (09/25/18 1115)  Resp: 15 (09/25/18 1115)  BP: 103/66 (09/25/18 1115)  SpO2: 95 % (09/25/18 1115) Vital Signs (24h Range):  Temp:  [97.1 °F (36.2 °C)-98.9 °F (37.2 °C)] 97.5 °F (36.4 °C)  Pulse:  [] 90  Resp:  [11-15] 15  SpO2:  [95 %-99 %] 95 %  BP: ()/(53-66) 103/66     Weight: 54.3 kg (119 lb 11.4 oz)  Body mass index is 20.69 kg/m².    Intake/Output - Last 3 Shifts       09/23 0700 - 09/24 0659 09/24 0700 - 09/25 0659 09/25 0700 - 09/26 0659    P.O.  360     I.V. (mL/kg)  100 (1.8)     Total Intake(mL/kg)  460 (8.5)     Net  " +460            Urine Occurrence  4 x     Stool Occurrence  2 x           Physical Exam   Constitutional: She is oriented to person, place, and time. She appears well-developed. No distress.   Temporal and distal extremity muscle wasting   Eyes: No scleral icterus.   Cardiovascular: Normal rate, regular rhythm and normal heart sounds.   Pulmonary/Chest: Effort normal and breath sounds normal. She has no rales.   Abdominal: Soft. Bowel sounds are normal. She exhibits distension (ascites). There is no tenderness.   Musculoskeletal: She exhibits edema.   Neurological: She is alert and oriented to person, place, and time.   Skin: Skin is warm and dry. She is not diaphoretic.   Psychiatric: She has a normal mood and affect. Her behavior is normal. Judgment and thought content normal.   Nursing note and vitals reviewed.      Laboratory:  Immunosuppressants     None        CBC:   Recent Labs   Lab  09/25/18   0954   WBC  3.33*   RBC  2.87*   HGB  9.1*   HCT  27.0*   PLT  79*   MCV  94   MCH  31.7*   MCHC  33.7     CMP:   Recent Labs   Lab  09/25/18   0955   GLU  290*   CALCIUM  9.4   ALBUMIN  3.9   PROT  6.1   NA  123*   K  3.5   CO2  23   CL  89*   BUN  81*   CREATININE  1.3   ALKPHOS  257*   ALT  66*   AST  96*   BILITOT  2.0*     Labs within the past 24 hours have been reviewed.    Diagnostic Results:  None    Assessment/Plan:     * Hyponatremia    - multiple recent admissions for hyponatremia  - sodium 124 on admission  - low sodium diet  - fluid restriction was 1L, now 800cc  - nephrology consulted          Ascites    - patient abdomen soft, slightly distended  - last para 9/21 with 472 WBC, 4% segs, negative GS   - currently being treated for SBP noted on para from 9/18 with 772 WBC, 89% segs. Treating with cefpodoxime (stop date 9/28).  - plan for para on Wednesday 9/26        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    - listed for oltx  - continue entecavir        Type 2 diabetes mellitus with diabetic  polyneuropathy, with long-term current use of insulin    - restart home insulin regimen  - SSI  - endocrine consult         Hypotension    - BP stably low  - continue midodrine  - monitor        Severe malnutrition    - Protein powder ordered  - Prehab protocol as outpt- on hold for hyponatremia + nausea          Liver transplant candidate    - listed for oltx with MELD 24  - currently on hold 2/2 hyponatremia     - MELD-Na score: 24 at 9/25/2018  9:55 AM  MELD score: 13 at 9/25/2018  9:55 AM  Calculated from:  Serum Creatinine: 1.3 mg/dL at 9/25/2018  9:55 AM  Serum Sodium: 123 mmol/L (Rounded to 125 mmol/L) at 9/25/2018  9:55 AM  Total Bilirubin: 2 mg/dL at 9/25/2018  9:55 AM  INR(ratio): 1.1 at 9/25/2018  9:54 AM  Age: 68 years          Hepatic encephalopathy    - patient alert and oriented  - continue lactulose and rifaxamin            VTE Risk Mitigation (From admission, onward)        Ordered     heparin (porcine) injection 5,000 Units  Every 8 hours      09/24/18 1817     Place sequential compression device  Until discontinued      09/24/18 1817     IP VTE HIGH RISK PATIENT  Once      09/24/18 1817          The patients clinical status was discussed at multidisplinary rounds, involving transplant surgery, transplant medicine, pharmacy, nursing, nutrition, and social work    Discharge Planning: not candidate at this time  No Patient Care Coordination Note on file.      Seble Tineo NP  Liver Transplant  [facility]

## 2018-09-26 LAB
ALBUMIN SERPL BCP-MCNC: 4.1 G/DL
ALP SERPL-CCNC: 323 U/L
ALT SERPL W/O P-5'-P-CCNC: 82 U/L
ANION GAP SERPL CALC-SCNC: 14 MMOL/L
APPEARANCE FLD: NORMAL
AST SERPL-CCNC: 121 U/L
BASOPHILS # BLD AUTO: 0.02 K/UL
BASOPHILS NFR BLD: 0.4 %
BILIRUB SERPL-MCNC: 2 MG/DL
BODY FLD TYPE: NORMAL
BUN SERPL-MCNC: 84 MG/DL
CALCIUM SERPL-MCNC: 9.8 MG/DL
CHLORIDE SERPL-SCNC: 91 MMOL/L
CO2 SERPL-SCNC: 22 MMOL/L
COLOR FLD: NORMAL
CORTIS SERPL-MCNC: 17.1 UG/DL
CREAT SERPL-MCNC: 1.1 MG/DL
DIFFERENTIAL METHOD: ABNORMAL
EOSINOPHIL # BLD AUTO: 0.1 K/UL
EOSINOPHIL NFR BLD: 1.7 %
ERYTHROCYTE [DISTWIDTH] IN BLOOD BY AUTOMATED COUNT: 15.9 %
EST. GFR  (AFRICAN AMERICAN): 59.2 ML/MIN/1.73 M^2
EST. GFR  (NON AFRICAN AMERICAN): 51.3 ML/MIN/1.73 M^2
GLUCOSE SERPL-MCNC: 159 MG/DL
HCT VFR BLD AUTO: 28.7 %
HGB BLD-MCNC: 9.9 G/DL
IMM GRANULOCYTES # BLD AUTO: 0.03 K/UL
IMM GRANULOCYTES NFR BLD AUTO: 0.6 %
INR PPP: 1
LYMPHOCYTES # BLD AUTO: 0.6 K/UL
LYMPHOCYTES NFR BLD: 11.3 %
LYMPHOCYTES NFR FLD MANUAL: 56 %
MAGNESIUM SERPL-MCNC: 2.7 MG/DL
MCH RBC QN AUTO: 32.2 PG
MCHC RBC AUTO-ENTMCNC: 34.5 G/DL
MCV RBC AUTO: 94 FL
MONOCYTES # BLD AUTO: 0.6 K/UL
MONOCYTES NFR BLD: 11.5 %
MONOS+MACROS NFR FLD MANUAL: 28 %
NEUTROPHILS # BLD AUTO: 3.9 K/UL
NEUTROPHILS NFR BLD: 74.5 %
NEUTROPHILS NFR FLD MANUAL: 16 %
NRBC BLD-RTO: 0 /100 WBC
PLATELET # BLD AUTO: 77 K/UL
PMV BLD AUTO: 11.3 FL
POCT GLUCOSE: 171 MG/DL (ref 70–110)
POCT GLUCOSE: 174 MG/DL (ref 70–110)
POCT GLUCOSE: 174 MG/DL (ref 70–110)
POCT GLUCOSE: 306 MG/DL (ref 70–110)
POCT GLUCOSE: 352 MG/DL (ref 70–110)
POCT GLUCOSE: 361 MG/DL (ref 70–110)
POCT GLUCOSE: 408 MG/DL (ref 70–110)
POTASSIUM SERPL-SCNC: 3.4 MMOL/L
PROT SERPL-MCNC: 6.5 G/DL
PROTHROMBIN TIME: 10.9 SEC
RBC # BLD AUTO: 3.07 M/UL
SODIUM SERPL-SCNC: 127 MMOL/L
WBC # BLD AUTO: 5.22 K/UL
WBC # FLD: 175 /CU MM

## 2018-09-26 PROCEDURE — 85610 PROTHROMBIN TIME: CPT | Mod: NTX

## 2018-09-26 PROCEDURE — P9047 ALBUMIN (HUMAN), 25%, 50ML: HCPCS | Mod: JG,NTX | Performed by: TRANSPLANT SURGERY

## 2018-09-26 PROCEDURE — 87102 FUNGUS ISOLATION CULTURE: CPT | Mod: NTX

## 2018-09-26 PROCEDURE — 25000003 PHARM REV CODE 250: Mod: NTX | Performed by: PHYSICIAN ASSISTANT

## 2018-09-26 PROCEDURE — 85025 COMPLETE CBC W/AUTO DIFF WBC: CPT | Mod: NTX

## 2018-09-26 PROCEDURE — 36415 COLL VENOUS BLD VENIPUNCTURE: CPT | Mod: NTX

## 2018-09-26 PROCEDURE — 0W9G3ZZ DRAINAGE OF PERITONEAL CAVITY, PERCUTANEOUS APPROACH: ICD-10-PCS | Performed by: NURSE PRACTITIONER

## 2018-09-26 PROCEDURE — 87075 CULTR BACTERIA EXCEPT BLOOD: CPT | Mod: NTX

## 2018-09-26 PROCEDURE — 63600175 PHARM REV CODE 636 W HCPCS: Mod: JG,NTX | Performed by: TRANSPLANT SURGERY

## 2018-09-26 PROCEDURE — 63600175 PHARM REV CODE 636 W HCPCS: Mod: NTX | Performed by: NURSE PRACTITIONER

## 2018-09-26 PROCEDURE — 83735 ASSAY OF MAGNESIUM: CPT | Mod: NTX

## 2018-09-26 PROCEDURE — 25000003 PHARM REV CODE 250: Mod: NTX | Performed by: NURSE PRACTITIONER

## 2018-09-26 PROCEDURE — 87070 CULTURE OTHR SPECIMN AEROBIC: CPT | Mod: NTX

## 2018-09-26 PROCEDURE — 80053 COMPREHEN METABOLIC PANEL: CPT | Mod: NTX

## 2018-09-26 PROCEDURE — 20600001 HC STEP DOWN PRIVATE ROOM: Mod: NTX

## 2018-09-26 PROCEDURE — 89051 BODY FLUID CELL COUNT: CPT | Mod: NTX

## 2018-09-26 PROCEDURE — 63600175 PHARM REV CODE 636 W HCPCS: Mod: NTX | Performed by: PHYSICIAN ASSISTANT

## 2018-09-26 PROCEDURE — 82533 TOTAL CORTISOL: CPT | Mod: NTX

## 2018-09-26 PROCEDURE — 99232 SBSQ HOSP IP/OBS MODERATE 35: CPT | Mod: NTX,,, | Performed by: NURSE PRACTITIONER

## 2018-09-26 PROCEDURE — 87205 SMEAR GRAM STAIN: CPT | Mod: NTX

## 2018-09-26 PROCEDURE — 99233 SBSQ HOSP IP/OBS HIGH 50: CPT | Mod: NTX,,, | Performed by: NURSE PRACTITIONER

## 2018-09-26 RX ORDER — ALBUMIN HUMAN 250 G/1000ML
25 SOLUTION INTRAVENOUS ONCE
Status: COMPLETED | OUTPATIENT
Start: 2018-09-26 | End: 2018-09-26

## 2018-09-26 RX ORDER — ALBUMIN HUMAN 250 G/1000ML
12.5 SOLUTION INTRAVENOUS ONCE
Status: COMPLETED | OUTPATIENT
Start: 2018-09-26 | End: 2018-09-26

## 2018-09-26 RX ORDER — INSULIN ASPART 100 [IU]/ML
17 INJECTION, SOLUTION INTRAVENOUS; SUBCUTANEOUS
Status: DISCONTINUED | OUTPATIENT
Start: 2018-09-26 | End: 2018-09-27 | Stop reason: HOSPADM

## 2018-09-26 RX ORDER — INSULIN ASPART 100 [IU]/ML
17 INJECTION, SOLUTION INTRAVENOUS; SUBCUTANEOUS
Status: DISCONTINUED | OUTPATIENT
Start: 2018-09-27 | End: 2018-09-27 | Stop reason: HOSPADM

## 2018-09-26 RX ORDER — INSULIN ASPART 100 [IU]/ML
20 INJECTION, SOLUTION INTRAVENOUS; SUBCUTANEOUS
Status: DISCONTINUED | OUTPATIENT
Start: 2018-09-27 | End: 2018-09-27 | Stop reason: HOSPADM

## 2018-09-26 RX ADMIN — ENTECAVIR 0.5 MG: 0.5 TABLET ORAL at 08:09

## 2018-09-26 RX ADMIN — RIFAXIMIN 550 MG: 550 TABLET ORAL at 09:09

## 2018-09-26 RX ADMIN — ALBUMIN (HUMAN) 12.5 G: 25 SOLUTION INTRAVENOUS at 03:09

## 2018-09-26 RX ADMIN — HEPARIN SODIUM 5000 UNITS: 5000 INJECTION, SOLUTION INTRAVENOUS; SUBCUTANEOUS at 05:09

## 2018-09-26 RX ADMIN — MIDODRINE HYDROCHLORIDE 15 MG: 5 TABLET ORAL at 09:09

## 2018-09-26 RX ADMIN — LACTULOSE 15 G: 20 SOLUTION ORAL at 09:09

## 2018-09-26 RX ADMIN — RIFAXIMIN 550 MG: 550 TABLET ORAL at 08:09

## 2018-09-26 RX ADMIN — CEFPODOXIME PROXETIL 200 MG: 200 TABLET, FILM COATED ORAL at 08:09

## 2018-09-26 RX ADMIN — INSULIN ASPART 5 UNITS: 100 INJECTION, SOLUTION INTRAVENOUS; SUBCUTANEOUS at 05:09

## 2018-09-26 RX ADMIN — ALBUMIN (HUMAN) 25 G: 25 SOLUTION INTRAVENOUS at 02:09

## 2018-09-26 RX ADMIN — INSULIN ASPART 2 UNITS: 100 INJECTION, SOLUTION INTRAVENOUS; SUBCUTANEOUS at 11:09

## 2018-09-26 RX ADMIN — SODIUM BICARBONATE 650 MG TABLET 1300 MG: at 05:09

## 2018-09-26 RX ADMIN — INSULIN ASPART 17 UNITS: 100 INJECTION, SOLUTION INTRAVENOUS; SUBCUTANEOUS at 11:09

## 2018-09-26 RX ADMIN — LACTULOSE 15 G: 20 SOLUTION ORAL at 08:09

## 2018-09-26 RX ADMIN — INSULIN ASPART 17 UNITS: 100 INJECTION, SOLUTION INTRAVENOUS; SUBCUTANEOUS at 05:09

## 2018-09-26 RX ADMIN — CEFPODOXIME PROXETIL 200 MG: 200 TABLET, FILM COATED ORAL at 09:09

## 2018-09-26 RX ADMIN — INSULIN ASPART 4 UNITS: 100 INJECTION, SOLUTION INTRAVENOUS; SUBCUTANEOUS at 11:09

## 2018-09-26 RX ADMIN — DOCUSATE SODIUM 50 MG: 50 CAPSULE, LIQUID FILLED ORAL at 08:09

## 2018-09-26 RX ADMIN — INSULIN ASPART 2 UNITS: 100 INJECTION, SOLUTION INTRAVENOUS; SUBCUTANEOUS at 05:09

## 2018-09-26 RX ADMIN — INSULIN DETEMIR 20 UNITS: 100 INJECTION, SOLUTION SUBCUTANEOUS at 09:09

## 2018-09-26 RX ADMIN — MIDODRINE HYDROCHLORIDE 15 MG: 5 TABLET ORAL at 05:09

## 2018-09-26 RX ADMIN — SODIUM BICARBONATE 650 MG TABLET 1300 MG: at 08:09

## 2018-09-26 RX ADMIN — LACTULOSE 15 G: 20 SOLUTION ORAL at 05:09

## 2018-09-26 RX ADMIN — SODIUM BICARBONATE 650 MG TABLET 1300 MG: at 09:09

## 2018-09-26 RX ADMIN — HEPARIN SODIUM 5000 UNITS: 5000 INJECTION, SOLUTION INTRAVENOUS; SUBCUTANEOUS at 09:09

## 2018-09-26 RX ADMIN — MIDODRINE HYDROCHLORIDE 15 MG: 5 TABLET ORAL at 01:09

## 2018-09-26 RX ADMIN — INSULIN ASPART 17 UNITS: 100 INJECTION, SOLUTION INTRAVENOUS; SUBCUTANEOUS at 08:09

## 2018-09-26 NOTE — PROCEDURES
Radiology Post-Procedure Note    Pre Op Diagnosis: Ascites  Post Op Diagnosis: Same    Procedure: Ultrasound Guided Paracentesis    Procedure performed by: Roseanna Mccarty DNP, RENAY    Written Informed Consent Obtained: Yes  Specimen Removed: YES cloudy pink  Estimated Blood Loss: Minimal    Findings:   Successful paracentesis.  Albumin administered PRN per protocol.    Patient tolerated procedure well.    Roseanna Mccarty DNP, RENAY  Interventional Radiology

## 2018-09-26 NOTE — PROGRESS NOTES
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 She is listed for liver transplant with MELD 24. She has had multiple recent admission for hyponatremia. She is being directly admitted 9/24 for hyponatremia, sodium of 124. Labs otherwise stable. She has been getting twice weekly paracentesis. Last para was on 9/21/18 with , segs 4%, negative gram stain (improves from previous para on 9/18 with cell count of 772 with 89% segs). She is completing a 10 day course of cefpodoxime (stop date of 9/28). She admits to fatigue, intermittent nausea, decreased appetite with nausea (otherwise eating ok).  She denies fevers, chills, CP, SOB, diarrhea, constipation, changes in UOP or worsening edema. She will be admitted for management of hyponatremia with low sodium diet, fluid restriction of 1L, nephrology consult, and consideration of tolvaptan, and improvement of BG control.     Hospital Course:  Admitted due to hyponatremia.  Well known to liver service with multiple admits for hyponatremia.  Hyponatremia dropped to 123.  Reinforced education on fluid restriction.  Increased fluid restriction to 800cc per day with success.  Sodium improved to 127 this am.  Endocrine consulted for hyperglycemia management.  Will plan for paracentesis 9/26.  A new caregiver, abril daughter, to arrive tomorrow 9/27.  Plan for PDOL teaching and discharge.    Scheduled Meds:   cefpodoxime  200 mg Oral BID    docusate sodium  50 mg Oral Daily    entecavir  0.5 mg Oral Daily    heparin (porcine)  5,000 Units  Subcutaneous Q8H    insulin aspart U-100  17 Units Subcutaneous TIDWM    insulin detemir U-100  20 Units Subcutaneous QHS    lactulose  15 g Oral TID    midodrine  15 mg Oral Q8H    rifAXIMin  550 mg Oral BID    sodium bicarbonate  1,300 mg Oral TID     Continuous Infusions:  PRN Meds:dextrose 50%, dextrose 50%, glucagon (human recombinant), glucose, glucose, insulin aspart U-100, insulin aspart U-100, ondansetron, ramelteon, sodium chloride 0.9%    Review of Systems   Constitutional: Positive for activity change and appetite change.   Gastrointestinal: Positive for abdominal distention and abdominal pain.     Objective:     Vital Signs (Most Recent):  Temp: 97.8 °F (36.6 °C) (09/26/18 1131)  Pulse: 107 (09/26/18 1245)  Resp: 15 (09/26/18 1245)  BP: 116/69 (09/26/18 1245)  SpO2: 98 % (09/26/18 1245) Vital Signs (24h Range):  Temp:  [97.6 °F (36.4 °C)-98.3 °F (36.8 °C)] 97.8 °F (36.6 °C)  Pulse:  [] 107  Resp:  [11-16] 15  SpO2:  [97 %-99 %] 98 %  BP: ()/(63-72) 116/69     Weight: 54.4 kg (119 lb 14.9 oz)  Body mass index is 20.73 kg/m².    Intake/Output - Last 3 Shifts       09/24 0700 - 09/25 0659 09/25 0700 - 09/26 0659 09/26 0700 - 09/27 0659    P.O. 360 720     I.V. (mL/kg) 100 (1.8)      Total Intake(mL/kg) 460 (8.5) 720 (13.2)     Net +460 +720            Urine Occurrence 4 x 5 x     Stool Occurrence 2 x 4 x           Physical Exam   Constitutional: She is oriented to person, place, and time. She appears well-developed.   Hand and temporal muscles wasting   HENT:   Head: Normocephalic and atraumatic.   Eyes: Pupils are equal, round, and reactive to light. Scleral icterus is present.   Cardiovascular: Normal rate, regular rhythm, normal heart sounds and intact distal pulses.   Pulmonary/Chest: Effort normal and breath sounds normal.   Abdominal: Bowel sounds are normal. She exhibits distension and ascites.   Musculoskeletal: She exhibits no edema.   Neurological: She is alert and oriented to  person, place, and time.   Skin: Skin is warm and dry.   Psychiatric: She has a normal mood and affect. Her behavior is normal. Judgment and thought content normal.   Nursing note and vitals reviewed.      Laboratory:  Immunosuppressants     None        CBC:   Recent Labs   Lab  09/26/18   0537   WBC  5.22   RBC  3.07*   HGB  9.9*   HCT  28.7*   PLT  77*   MCV  94   MCH  32.2*   MCHC  34.5     CMP:   Recent Labs   Lab  09/26/18   0537   GLU  159*   CALCIUM  9.8   ALBUMIN  4.1   PROT  6.5   NA  127*   K  3.4*   CO2  22*   CL  91*   BUN  84*   CREATININE  1.1   ALKPHOS  323*   ALT  82*   AST  121*   BILITOT  2.0*     Coagulation:   Recent Labs   Lab  09/26/18   0537   INR  1.0     Labs within the past 24 hours have been reviewed.    Diagnostic Results:  None    Assessment/Plan:     * Hyponatremia    - multiple recent admissions for hyponatremia  - sodium 124 on admission  - low sodium diet  - fluid restriction was 1L, now 800cc  - nephrology consulted  - sodium improved to 127        Ascites    - patient abdomen soft, slightly distended  - last para 9/21 with 472 WBC, 4% segs, negative GS   - currently being treated for SBP noted on para from 9/18 with 772 WBC, 89% segs. Treating with cefpodoxime (stop date 9/28).  - plan for para on Wednesday 9/26        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    - listed for oltx  - continue entecavir        Type 2 diabetes mellitus with diabetic polyneuropathy, with long-term current use of insulin    - restart home insulin regimen  - SSI  - endocrine consult         Hypotension    - BP stably low  - continue midodrine  - monitor        Severe malnutrition    - Protein powder ordered  - Prehab protocol as outpt- on hold for hyponatremia + nausea          Liver transplant candidate    - listed for oltx with MELD 24  - currently on hold 2/2 hyponatremia     - MELD-Na score: 24 at 9/25/2018  9:55 AM  MELD score: 13 at 9/25/2018  9:55 AM  Calculated from:  Serum Creatinine:  1.3 mg/dL at 9/25/2018  9:55 AM  Serum Sodium: 123 mmol/L (Rounded to 125 mmol/L) at 9/25/2018  9:55 AM  Total Bilirubin: 2 mg/dL at 9/25/2018  9:55 AM  INR(ratio): 1.1 at 9/25/2018  9:54 AM  Age: 68 years          Hepatic encephalopathy    - patient alert and oriented  - continue lactulose and rifaxamin            VTE Risk Mitigation (From admission, onward)        Ordered     heparin (porcine) injection 5,000 Units  Every 8 hours      09/24/18 1817     Place sequential compression device  Until discontinued      09/24/18 1817     IP VTE HIGH RISK PATIENT  Once      09/24/18 1817          The patients clinical status was discussed at multidisplinary rounds, involving transplant surgery, transplant medicine, pharmacy, nursing, nutrition, and social work    Discharge Planning: likely d/c in am once daughter taught  No Patient Care Coordination Note on file.      Seble Tineo NP  Liver Transplant  [facility]

## 2018-09-26 NOTE — SUBJECTIVE & OBJECTIVE
Scheduled Meds:   cefpodoxime  200 mg Oral BID    docusate sodium  50 mg Oral Daily    entecavir  0.5 mg Oral Daily    heparin (porcine)  5,000 Units Subcutaneous Q8H    insulin aspart U-100  17 Units Subcutaneous TIDWM    insulin detemir U-100  20 Units Subcutaneous QHS    lactulose  15 g Oral TID    midodrine  15 mg Oral Q8H    rifAXIMin  550 mg Oral BID    sodium bicarbonate  1,300 mg Oral TID     Continuous Infusions:  PRN Meds:dextrose 50%, dextrose 50%, glucagon (human recombinant), glucose, glucose, insulin aspart U-100, insulin aspart U-100, ondansetron, ramelteon, sodium chloride 0.9%    Review of Systems   Constitutional: Positive for activity change and appetite change.   Gastrointestinal: Positive for abdominal distention and abdominal pain.     Objective:     Vital Signs (Most Recent):  Temp: 97.8 °F (36.6 °C) (09/26/18 1131)  Pulse: 107 (09/26/18 1245)  Resp: 15 (09/26/18 1245)  BP: 116/69 (09/26/18 1245)  SpO2: 98 % (09/26/18 1245) Vital Signs (24h Range):  Temp:  [97.6 °F (36.4 °C)-98.3 °F (36.8 °C)] 97.8 °F (36.6 °C)  Pulse:  [] 107  Resp:  [11-16] 15  SpO2:  [97 %-99 %] 98 %  BP: ()/(63-72) 116/69     Weight: 54.4 kg (119 lb 14.9 oz)  Body mass index is 20.73 kg/m².    Intake/Output - Last 3 Shifts       09/24 0700 - 09/25 0659 09/25 0700 - 09/26 0659 09/26 0700 - 09/27 0659    P.O. 360 720     I.V. (mL/kg) 100 (1.8)      Total Intake(mL/kg) 460 (8.5) 720 (13.2)     Net +460 +720            Urine Occurrence 4 x 5 x     Stool Occurrence 2 x 4 x           Physical Exam   Constitutional: She is oriented to person, place, and time. She appears well-developed.   Hand and temporal muscles wasting   HENT:   Head: Normocephalic and atraumatic.   Eyes: Pupils are equal, round, and reactive to light. Scleral icterus is present.   Cardiovascular: Normal rate, regular rhythm, normal heart sounds and intact distal pulses.   Pulmonary/Chest: Effort normal and breath sounds normal.    Abdominal: Bowel sounds are normal. She exhibits distension and ascites.   Musculoskeletal: She exhibits no edema.   Neurological: She is alert and oriented to person, place, and time.   Skin: Skin is warm and dry.   Psychiatric: She has a normal mood and affect. Her behavior is normal. Judgment and thought content normal.   Nursing note and vitals reviewed.      Laboratory:  Immunosuppressants     None        CBC:   Recent Labs   Lab  09/26/18   0537   WBC  5.22   RBC  3.07*   HGB  9.9*   HCT  28.7*   PLT  77*   MCV  94   MCH  32.2*   MCHC  34.5     CMP:   Recent Labs   Lab  09/26/18   0537   GLU  159*   CALCIUM  9.8   ALBUMIN  4.1   PROT  6.5   NA  127*   K  3.4*   CO2  22*   CL  91*   BUN  84*   CREATININE  1.1   ALKPHOS  323*   ALT  82*   AST  121*   BILITOT  2.0*     Coagulation:   Recent Labs   Lab  09/26/18   0537   INR  1.0     Labs within the past 24 hours have been reviewed.    Diagnostic Results:  None

## 2018-09-26 NOTE — SUBJECTIVE & OBJECTIVE
"Interval HPI:   Overnight events: remains in TSU. BG at goal overnight and this AM with increase insulin doses. Tentative paracentesis today.  Dunia at bedside.   Eatin%  Nausea: No  Hypoglycemia and intervention: No  Fever: No  TPN and/or TF: No    /64   Pulse 94   Temp 97.6 °F (36.4 °C) (Oral)   Resp 14   Ht 5' 3.78" (1.62 m)   Wt 54.4 kg (119 lb 14.9 oz)   LMP  (LMP Unknown)   SpO2 97%   Breastfeeding? No   BMI 20.73 kg/m²     Labs Reviewed and Include    Recent Labs   Lab  18   0537   GLU  159*   CALCIUM  9.8   ALBUMIN  4.1   PROT  6.5   NA  127*   K  3.4*   CO2  22*   CL  91*   BUN  84*   CREATININE  1.1   ALKPHOS  323*   ALT  82*   AST  121*   BILITOT  2.0*     Lab Results   Component Value Date    WBC 5.22 2018    HGB 9.9 (L) 2018    HCT 28.7 (L) 2018    MCV 94 2018    PLT 77 (L) 2018     No results for input(s): TSH, FREET4 in the last 168 hours.  Lab Results   Component Value Date    HGBA1C 6.8 (H) 2018       Nutritional status:   Body mass index is 20.73 kg/m².  Lab Results   Component Value Date    ALBUMIN 4.1 2018    ALBUMIN 3.9 2018    ALBUMIN 4.0 2018     Lab Results   Component Value Date    PREALBUMIN 12 (L) 2018       Estimated Creatinine Clearance: 41.3 mL/min (based on SCr of 1.1 mg/dL).    Accu-Checks  Recent Labs      18   0823  18   1230  18   1722  18   2108  18   0755   POCTGLUCOSE  164*  372*  361*  171*  174*       Current Medications and/or Treatments Impacting Glycemic Control  Immunotherapy:    Immunosuppressants     None        Steroids:   Hormones (From admission, onward)    None        Pressors:    Autonomic Drugs (From admission, onward)    Start     Stop Route Frequency Ordered    18 2200  midodrine tablet 15 mg      -- Oral Every 8 hours 18 1820        Hyperglycemia/Diabetes Medications:   Antihyperglycemics (From admission, onward)    Start "     Stop Route Frequency Ordered    09/25/18 2100  insulin detemir U-100 pen 20 Units      -- SubQ Nightly 09/25/18 1659 09/25/18 1757  insulin aspart U-100 pen 2 Units      -- SubQ As needed (PRN) 09/25/18 1659 09/25/18 1700  insulin aspart U-100 pen 17 Units      -- SubQ 3 times daily with meals 09/25/18 1659 09/24/18 1920  insulin aspart U-100 pen 0-5 Units      -- SubQ Before meals & nightly PRN 09/24/18 1820

## 2018-09-26 NOTE — ASSESSMENT & PLAN NOTE
- multiple recent admissions for hyponatremia  - sodium 124 on admission  - low sodium diet  - fluid restriction was 1L, now 800cc  - nephrology consulted  - sodium improved to 127

## 2018-09-26 NOTE — ASSESSMENT & PLAN NOTE
BG goal 140-180      Continue Levemir 20 units HS   Increase Novolog 20 units with breakfast and novolog 17 units with lunch and dinner.  Continue novolog 2 units prn with meals when patient also drinking protein shake.   bg monitoring ac/hs and low dose correction scale.

## 2018-09-26 NOTE — PROGRESS NOTES
"[facility]  Endocrinology  Progress Note    Admit Date: 2018     Reason for Consult: Management of type 2 DM, Hyperglycemia     Surgical Procedure and Date: n/a     Diabetes diagnosis year:     Home Diabetes Medications:  Lantus 18 units HS and novolog 17 units with meals plus correction scale (150-200 +1)  Lab Results   Component Value Date    HGBA1C 6.8 (H) 2018         How often checking glucose at home? 3-4   BG readings on regimen: 180-310  Hypoglycemia on the regimen? yes, none recently   Missed doses on regimen?  No    Diabetes Complications include:     Diabetic peripheral neuropathy     Complicating diabetes co morbidities:   CIRRHOSIS      HPI:   Patient is a 68 y.o. female with a diagnosis of type 2 DM on MDI. Also with decompensated cirrhosis due to hep c/b with ascites requiring paracentesis twice weeks, hepatic encephalopathy, esophageal varices, and hyponatremia. Patient has frequently been admitted recently with hyponatremia. Endocrinology consulted for BG/ DM management.           Interval HPI:   Overnight events: remains in TSU. BG at goal overnight and this AM with increase insulin doses. Tentative paracentesis today.  Dunia at bedside.   Eatin%  Nausea: No  Hypoglycemia and intervention: No  Fever: No  TPN and/or TF: No    /64   Pulse 94   Temp 97.6 °F (36.4 °C) (Oral)   Resp 14   Ht 5' 3.78" (1.62 m)   Wt 54.4 kg (119 lb 14.9 oz)   LMP  (LMP Unknown)   SpO2 97%   Breastfeeding? No   BMI 20.73 kg/m²      Labs Reviewed and Include    Recent Labs   Lab  18   0537   GLU  159*   CALCIUM  9.8   ALBUMIN  4.1   PROT  6.5   NA  127*   K  3.4*   CO2  22*   CL  91*   BUN  84*   CREATININE  1.1   ALKPHOS  323*   ALT  82*   AST  121*   BILITOT  2.0*     Lab Results   Component Value Date    WBC 5.22 2018    HGB 9.9 (L) 2018    HCT 28.7 (L) 2018    MCV 94 2018    PLT 77 (L) 2018     No results for " input(s): TSH, FREET4 in the last 168 hours.  Lab Results   Component Value Date    HGBA1C 6.8 (H) 08/16/2018       Nutritional status:   Body mass index is 20.73 kg/m².  Lab Results   Component Value Date    ALBUMIN 4.1 09/26/2018    ALBUMIN 3.9 09/25/2018    ALBUMIN 4.0 09/24/2018     Lab Results   Component Value Date    PREALBUMIN 12 (L) 09/17/2018       Estimated Creatinine Clearance: 41.3 mL/min (based on SCr of 1.1 mg/dL).    Accu-Checks  Recent Labs      09/25/18   0823  09/25/18   1230  09/25/18   1722  09/25/18   2108  09/26/18   0755   POCTGLUCOSE  164*  372*  361*  171*  174*       Current Medications and/or Treatments Impacting Glycemic Control  Immunotherapy:    Immunosuppressants     None        Steroids:   Hormones (From admission, onward)    None        Pressors:    Autonomic Drugs (From admission, onward)    Start     Stop Route Frequency Ordered    09/24/18 2200  midodrine tablet 15 mg      -- Oral Every 8 hours 09/24/18 1820        Hyperglycemia/Diabetes Medications:   Antihyperglycemics (From admission, onward)    Start     Stop Route Frequency Ordered    09/25/18 2100  insulin detemir U-100 pen 20 Units      -- SubQ Nightly 09/25/18 1659    09/25/18 1757  insulin aspart U-100 pen 2 Units      -- SubQ As needed (PRN) 09/25/18 1659    09/25/18 1700  insulin aspart U-100 pen 17 Units      -- SubQ 3 times daily with meals 09/25/18 1659    09/24/18 1920  insulin aspart U-100 pen 0-5 Units      -- SubQ Before meals & nightly PRN 09/24/18 1821          ASSESSMENT and PLAN    * Hyponatremia    Managed per primary.           Type 2 diabetes mellitus with diabetic polyneuropathy, with long-term current use of insulin    BG goal 140-180      Continue Levemir 20 units HS   Increase Novolog 20 units with breakfast and novolog 17 units with lunch and dinner.  Continue novolog 2 units prn with meals when patient also drinking protein shake.   bg monitoring ac/hs and low dose correction scale.         Chronic  hepatitis B with delta agent with cirrhosis    Managed per primary.   On transplant list MELD 24.               Aviva Caldera NP  Endocrinology  [facility]

## 2018-09-26 NOTE — PLAN OF CARE
Problem: Patient Care Overview  Goal: Plan of Care Review  Pt aaox4 vswnl and no c/o pain.  at bedside. Bed in low position and callbell within reach. accu ck at 1303=267. Pt had 5 bms in 24 hours and greater than 5 unmeasured urines. =2 ble noted. Pt is planned for paracentesis in am. Insulin adjusted toady. Colace and lactulose started on dayshift. Standard precautions maintained.

## 2018-09-26 NOTE — PROGRESS NOTES
Paracentesis complete at this time. Patient tolerated well. 6150cc removed from left abdomen. Dressing applied clean, dry and intact. 150 cc of albumin given IV. Report called to floor nurse. Patient transferred back to room via stretcher under care of transport.

## 2018-09-26 NOTE — H&P
Radiology History & Physical      SUBJECTIVE:     Chief Complaint: abdominal distention    History of Present Illness:  Scarlett Reddy is a 68 y.o. female who presents for ultrasound guided paracentesis  Past Medical History:   Diagnosis Date    Anemia requiring transfusions 8/16/2018    Ascites     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 8/15/2018    Cirrhosis     Cough 8/30/2018    Esophageal varices     Esophageal varices without bleeding 8/15/2018    Hepatic encephalopathy     Hepatitis B     Hepatitis D virus infection     Hypersplenism     Hyponatremia     Hypotension     Liver transplant candidate     Neutropenia     Portal hypertension     Severe malnutrition 8/16/2018    Type 2 diabetes mellitus, with long-term current use of insulin 8/15/2018     Past Surgical History:   Procedure Laterality Date    EGD (ESOPHAGOGASTRODUODENOSCOPY) N/A 8/17/2018    Performed by Travon Delgadillo MD at [facility] ([address] [place] [address])    ESOPHAGOGASTRODUODENOSCOPY N/A 8/17/2018    Procedure: EGD (ESOPHAGOGASTRODUODENOSCOPY);  Surgeon: Travon Delgadillo MD;  Location: [facility] ([address] [place] [address]);  Service: Endoscopy;  Laterality: N/A;       Home Meds:   Prior to Admission medications    Medication Sig Start Date End Date Taking? Authorizing Provider   blood sugar diagnostic (ACCU-CHEK DC) Strp 1 strip by Misc.(Non-Drug; Combo Route) route 3 (three) times daily.    Historical Provider, MD   cefpodoxime (VANTIN) 200 MG tablet Take 1 tablet (200 mg total) by mouth 2 (two) times daily. for 7 days 9/21/18 9/28/18  Gigi Sprague MD   entecavir (BARACLUDE) 0.5 MG Tab Take 1 tablet (0.5 mg total) by mouth once daily. 9/4/18   Rita Rubin NP   ergocalciferol, vitamin D2, (VITAMIN D ORAL) Take by mouth.    Historical Provider, MD   insulin aspart U-100 (NOVOLOG FLEXPEN U-100 INSULIN) 100 unit/mL InPn pen Inject 17 Units into the skin 3 (three) times daily with meals. Plus correction scale, max TDD 60 units daily 9/21/18    "Gigi Sprague MD   insulin glargine (LANTUS) 100 unit/mL injection Inject 18 Units into the skin every evening. 9/21/18   Gigi Sprague MD   lactulose (CHRONULAC) 10 gram/15 mL solution Take 30 mLs (20 g total) by mouth 3 (three) times daily. Adjust dose to have 4 bowel movements daily 9/12/18   Yony Burns MD   midodrine (PROAMATINE) 5 MG Tab Take 3 tablets (15 mg total) by mouth every 8 (eight) hours. 8/24/18 2/20/19  Gigi Sprague MD   OMEGA-3 FATTY ACIDS-EPA ORAL Take by mouth.    Historical Provider, MD   pen needle, diabetic (BD ULTRA-FINE CITLALI PEN NEEDLE) 32 gauge x 5/32" Ndle To use to inject insulin 4x daily 9/14/18   Rita Rubin NP   rifAXIMin (XIFAXAN) 550 mg Tab Take 1 tablet (550 mg total) by mouth 2 (two) times daily. 8/27/18   Rita Rubin NP   sodium bicarbonate 650 MG tablet Take 2 tablets (1,300 mg total) by mouth 3 (three) times daily. 9/21/18   Gigi Sprague MD     Anticoagulants/Antiplatelets: no anticoagulation    Allergies: Review of patient's allergies indicates:  No Known Allergies  Sedation History:  no adverse reactions    Review of Systems:   Hematological: no known coagulopathies  Respiratory: no shortness of breath  Cardiovascular: no chest pain  Gastrointestinal: no abdominal pain  Genito-Urinary: no dysuria  Musculoskeletal: negative  Neurological: no TIA or stroke symptoms         OBJECTIVE:     Vital Signs (Most Recent)  Temp: 97.8 °F (36.6 °C) (09/26/18 1131)  Pulse: 92 (09/26/18 1530)  Resp: 16 (09/26/18 1530)  BP: (!) 100/55 (09/26/18 1530)  SpO2: 100 % (09/26/18 1530)    Physical Exam:  ASA: 2  Mallampati: NA    General: no acute distress  Mental Status: alert and oriented to person, place and time  HEENT: normocephalic, atraumatic  Chest: unlabored breathing  Heart: regular heart rate  Abdomen: distended  Extremity: moves all extremities    Laboratory  Lab Results   Component Value Date    INR 1.0 09/26/2018       Lab Results   Component Value Date    WBC 5.22 " 09/26/2018    HGB 9.9 (L) 09/26/2018    HCT 28.7 (L) 09/26/2018    MCV 94 09/26/2018    PLT 77 (L) 09/26/2018      Lab Results   Component Value Date     (H) 09/26/2018     (L) 09/26/2018    K 3.4 (L) 09/26/2018    CL 91 (L) 09/26/2018    CO2 22 (L) 09/26/2018    BUN 84 (H) 09/26/2018    CREATININE 1.1 09/26/2018    CALCIUM 9.8 09/26/2018    MG 2.7 (H) 09/26/2018    ALT 82 (H) 09/26/2018     (H) 09/26/2018    ALBUMIN 4.1 09/26/2018    BILITOT 2.0 (H) 09/26/2018    BILIDIR 0.6 (H) 09/20/2018       ASSESSMENT/PLAN:     Sedation Plan: local anesthesia  Patient will undergo ultrasound guided paracentesis.    Roseanna Mccarty DNP, FNP-BC  Interventional Radiology

## 2018-09-26 NOTE — PLAN OF CARE
Problem: Spiritual Distress, Risk/Actual (Adult,Obstetrics,Pediatric)  Intervention: Facilitate Personal Exploration/Expression of Spirituality  Provided initial visit. Spoke w/ pt and  through  hotline. Will f/u with pt and family with  [facility] [phone]. Local rabbis have been contacted to offer religous support per Dunia.

## 2018-09-26 NOTE — PLAN OF CARE
Problem: Patient Care Overview  Goal: Plan of Care Review  Outcome: Ongoing (interventions implemented as appropriate)  Patient AAO I today. Vitals stable, BP low, midodrine on board.  Patient received paracentesis today, albumen given. Na 127 today, currently on 1L FR.  utilized throughout the day.  BG elevated, endocrine following, insulin adjusted.

## 2018-09-26 NOTE — PROGRESS NOTES
Patient transported down to IR for paracentesis, no acute distress noted at this time.  Will monitor for patients return.

## 2018-09-27 ENCOUNTER — TELEPHONE (OUTPATIENT)
Dept: TRANSPLANT | Facility: CLINIC | Age: 68
End: 2018-09-27

## 2018-09-27 VITALS
BODY MASS INDEX: 20.47 KG/M2 | DIASTOLIC BLOOD PRESSURE: 70 MMHG | HEART RATE: 90 BPM | HEIGHT: 64 IN | SYSTOLIC BLOOD PRESSURE: 108 MMHG | TEMPERATURE: 98 F | OXYGEN SATURATION: 97 % | RESPIRATION RATE: 16 BRPM | WEIGHT: 119.94 LBS

## 2018-09-27 DIAGNOSIS — Z76.82 ORGAN TRANSPLANT CANDIDATE: Primary | ICD-10-CM

## 2018-09-27 LAB
ALBUMIN SERPL BCP-MCNC: 3.9 G/DL
ALP SERPL-CCNC: 266 U/L
ALT SERPL W/O P-5'-P-CCNC: 62 U/L
ANION GAP SERPL CALC-SCNC: 10 MMOL/L
AST SERPL-CCNC: 87 U/L
BASOPHILS # BLD AUTO: 0.02 K/UL
BASOPHILS NFR BLD: 0.7 %
BILIRUB SERPL-MCNC: 1.9 MG/DL
BUN SERPL-MCNC: 83 MG/DL
CALCIUM SERPL-MCNC: 9.3 MG/DL
CHLORIDE SERPL-SCNC: 94 MMOL/L
CO2 SERPL-SCNC: 25 MMOL/L
CREAT SERPL-MCNC: 1.1 MG/DL
DIFFERENTIAL METHOD: ABNORMAL
EOSINOPHIL # BLD AUTO: 0.1 K/UL
EOSINOPHIL NFR BLD: 2.9 %
ERYTHROCYTE [DISTWIDTH] IN BLOOD BY AUTOMATED COUNT: 15.7 %
EST. GFR  (AFRICAN AMERICAN): 59.2 ML/MIN/1.73 M^2
EST. GFR  (NON AFRICAN AMERICAN): 51.3 ML/MIN/1.73 M^2
GLUCOSE SERPL-MCNC: 89 MG/DL
HCT VFR BLD AUTO: 23 %
HGB BLD-MCNC: 7.9 G/DL
IMM GRANULOCYTES # BLD AUTO: 0.01 K/UL
IMM GRANULOCYTES NFR BLD AUTO: 0.4 %
INR PPP: 1.1
LYMPHOCYTES # BLD AUTO: 0.4 K/UL
LYMPHOCYTES NFR BLD: 12.6 %
MAGNESIUM SERPL-MCNC: 2.7 MG/DL
MCH RBC QN AUTO: 32.1 PG
MCHC RBC AUTO-ENTMCNC: 34.3 G/DL
MCV RBC AUTO: 94 FL
MONOCYTES # BLD AUTO: 0.3 K/UL
MONOCYTES NFR BLD: 11.2 %
NEUTROPHILS # BLD AUTO: 2 K/UL
NEUTROPHILS NFR BLD: 72.2 %
NRBC BLD-RTO: 0 /100 WBC
PLATELET # BLD AUTO: 64 K/UL
PMV BLD AUTO: 11 FL
POCT GLUCOSE: 227 MG/DL (ref 70–110)
POCT GLUCOSE: 99 MG/DL (ref 70–110)
POTASSIUM SERPL-SCNC: 3 MMOL/L
PROT SERPL-MCNC: 5.8 G/DL
PROTHROMBIN TIME: 11.4 SEC
RBC # BLD AUTO: 2.46 M/UL
SODIUM SERPL-SCNC: 129 MMOL/L
WBC # BLD AUTO: 2.77 K/UL

## 2018-09-27 PROCEDURE — 83735 ASSAY OF MAGNESIUM: CPT | Mod: NTX

## 2018-09-27 PROCEDURE — 85610 PROTHROMBIN TIME: CPT | Mod: NTX

## 2018-09-27 PROCEDURE — 36415 COLL VENOUS BLD VENIPUNCTURE: CPT | Mod: NTX

## 2018-09-27 PROCEDURE — 25000003 PHARM REV CODE 250: Mod: NTX | Performed by: NURSE PRACTITIONER

## 2018-09-27 PROCEDURE — 80053 COMPREHEN METABOLIC PANEL: CPT | Mod: NTX

## 2018-09-27 PROCEDURE — 63600175 PHARM REV CODE 636 W HCPCS: Mod: NTX | Performed by: NURSE PRACTITIONER

## 2018-09-27 PROCEDURE — 85025 COMPLETE CBC W/AUTO DIFF WBC: CPT | Mod: NTX

## 2018-09-27 PROCEDURE — 63600175 PHARM REV CODE 636 W HCPCS: Mod: NTX | Performed by: PHYSICIAN ASSISTANT

## 2018-09-27 PROCEDURE — 25000003 PHARM REV CODE 250: Mod: NTX | Performed by: PHYSICIAN ASSISTANT

## 2018-09-27 RX ORDER — POTASSIUM CHLORIDE 20 MEQ/1
40 TABLET, EXTENDED RELEASE ORAL EVERY 4 HOURS
Status: DISCONTINUED | OUTPATIENT
Start: 2018-09-27 | End: 2018-09-27

## 2018-09-27 RX ORDER — POTASSIUM CHLORIDE 20 MEQ/15ML
40 SOLUTION ORAL
Status: DISCONTINUED | OUTPATIENT
Start: 2018-09-27 | End: 2018-09-27

## 2018-09-27 RX ORDER — INSULIN GLARGINE 100 [IU]/ML
20 INJECTION, SOLUTION SUBCUTANEOUS NIGHTLY
Qty: 15 ML | Refills: 3 | Status: ON HOLD
Start: 2018-09-27 | End: 2018-12-21 | Stop reason: HOSPADM

## 2018-09-27 RX ORDER — INSULIN ASPART 100 [IU]/ML
INJECTION, SOLUTION INTRAVENOUS; SUBCUTANEOUS
Qty: 15 ML | Refills: 1 | Status: ON HOLD | OUTPATIENT
Start: 2018-09-27 | End: 2018-12-21 | Stop reason: SDUPTHER

## 2018-09-27 RX ADMIN — LACTULOSE 15 G: 20 SOLUTION ORAL at 02:09

## 2018-09-27 RX ADMIN — POTASSIUM BICARBONATE 50 MEQ: 25 TABLET, EFFERVESCENT ORAL at 02:09

## 2018-09-27 RX ADMIN — HEPARIN SODIUM 5000 UNITS: 5000 INJECTION, SOLUTION INTRAVENOUS; SUBCUTANEOUS at 05:09

## 2018-09-27 RX ADMIN — ENTECAVIR 0.5 MG: 0.5 TABLET ORAL at 09:09

## 2018-09-27 RX ADMIN — SODIUM BICARBONATE 650 MG TABLET 1300 MG: at 09:09

## 2018-09-27 RX ADMIN — INSULIN ASPART 2 UNITS: 100 INJECTION, SOLUTION INTRAVENOUS; SUBCUTANEOUS at 12:09

## 2018-09-27 RX ADMIN — SODIUM BICARBONATE 650 MG TABLET 1300 MG: at 02:09

## 2018-09-27 RX ADMIN — MIDODRINE HYDROCHLORIDE 15 MG: 5 TABLET ORAL at 05:09

## 2018-09-27 RX ADMIN — INSULIN ASPART 20 UNITS: 100 INJECTION, SOLUTION INTRAVENOUS; SUBCUTANEOUS at 09:09

## 2018-09-27 RX ADMIN — INSULIN ASPART 17 UNITS: 100 INJECTION, SOLUTION INTRAVENOUS; SUBCUTANEOUS at 12:09

## 2018-09-27 RX ADMIN — MIDODRINE HYDROCHLORIDE 15 MG: 5 TABLET ORAL at 02:09

## 2018-09-27 RX ADMIN — CEFPODOXIME PROXETIL 200 MG: 200 TABLET, FILM COATED ORAL at 09:09

## 2018-09-27 RX ADMIN — DOCUSATE SODIUM 50 MG: 50 CAPSULE, LIQUID FILLED ORAL at 09:09

## 2018-09-27 RX ADMIN — RIFAXIMIN 550 MG: 550 TABLET ORAL at 09:09

## 2018-09-27 RX ADMIN — LACTULOSE 15 G: 20 SOLUTION ORAL at 09:09

## 2018-09-27 NOTE — PROGRESS NOTES
Met with patient and  with Kaye Duque .  Reviewed outpatient appointments. Allowed time for questions and answers.  Patient has a daughter flying in from [place] this afternoon.  Per  request they are asking for some education to be translated to her tomorrow in clinic.

## 2018-09-27 NOTE — PLAN OF CARE
Problem: Patient Care Overview  Goal: Plan of Care Review  Outcome: Ongoing (interventions implemented as appropriate)  AAOX4.  VSS.  No complaints of pain.  Pt Portuguese speaking only.  Pt's last sodium was 127.   is at the bedside.  Bed is in lowest position, call bell is in reach, and pt instructed to call nurse when needing assistance.  Will continue to monitor.

## 2018-09-27 NOTE — NURSING
RN attempted to administer ordered K+ but patient refused tablets. KEEGAN Pruett notified and to change to liquid form.

## 2018-09-27 NOTE — PROGRESS NOTES
(TIMBO) met with the patient (pt), pt's  and International Department  Olga to discuss discharge planning for today.  Pt presented alert and oriented, sitting on the sofa and engaged appropriately.  Pt's daughter Susan is scheduled to arrive today after 12pm and will come to the hospital/hotel pending pt's location at arrival time.  Susan will take over Angela's (pt's other daughter) local telephone numer ([phone]).  Pt will be seen in clinic on tomorrow and her daughter will receive education with Indigo and be assisted with possible translation from Katerin of the Internation Department (Olga will be out of the office traveling out of state; however, if needed, she can be contacted via telephone-number was not provided).  Pt will be returning to the [facility] with her .  Pt denied any needs/concerns at this time.  Upon exiting, magali Gibbons was presenting himself to follow up with the patient and to provide emotional/spirtual support.  As per magali Gibbons, he spoke with the pt via telephone a few days ago and she was receptive to his call; however, engaged minimally through Simha.  The local [facility] has not been available to provide spiritual guidance due to the Zoroastrianism holiday this week.  SW remains available for education, resources, support and discharge planning as appropriate.

## 2018-09-27 NOTE — DISCHARGE SUMMARY
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 She is listed for liver transplant with MELD 24. She has had multiple recent admission for hyponatremia. She is being directly admitted 9/24 for hyponatremia, sodium of 124. Labs otherwise stable. She has been getting twice weekly paracentesis. Last para was on 9/21/18 with , segs 4%, negative gram stain (improves from previous para on 9/18 with cell count of 772 with 89% segs). She is completing a 10 day course of cefpodoxime (stop date of 9/28). She admits to fatigue, intermittent nausea, decreased appetite with nausea (otherwise eating ok).  She denies fevers, chills, CP, SOB, diarrhea, constipation, changes in UOP or worsening edema. She will be admitted for management of hyponatremia with low sodium diet, fluid restriction of 1L, nephrology consult, and consideration of tolvaptan, and improvement of BG control.     * No surgery found *     Hospital Course:    Admitted due to hyponatremia.  Well known to liver service with multiple admits for hyponatremia.  Reinforced education on fluid restriction.  Increased fluid restriction to 800cc per day with success.  Sodium improved with only fluid restriction.  Endocrine consulted for hyperglycemia management.  Both hyponatremia and hyperglycemia well controlled while hospitalized.  Family and patient educated on medication/insulin regimen and fluid restriction.      Patient stable and ready for discharge.  Will plan  further education with daughter and  in clinic.  Will have labs and hepatology follow up next week.    Final Active Diagnoses:    Diagnosis Date Noted POA    PRINCIPAL PROBLEM:  Hyponatremia [E87.1]  Yes    Ascites [R18.8] 08/30/2018 Yes    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 [B18.0, K74.60] 08/15/2018 Yes    Type 2 diabetes mellitus with diabetic polyneuropathy, with long-term current use of insulin [E11.42, Z79.4] 08/15/2018 Not Applicable    Hypotension [I95.9]  Yes    Severe malnutrition [E43] 08/16/2018 Yes    Liver transplant candidate [Z76.82]  Not Applicable    Hepatic encephalopathy [K72.90] 08/14/2018 Yes      Problems Resolved During this Admission:       Consults (From admission, onward)        Status Ordering Provider     Inpatient consult to Endocrinology  Once     Provider:  (Not yet assigned)    Completed ONEYDA HIRSCH          Pending Diagnostic Studies:     Procedure Component Value Units Date/Time    IR Paracentesis with Imaging [[id number]] Resulted:  09/26/18 1430    Order Status:  Sent Lab Status:  In process Updated:  09/26/18 1532    Specimen:  Peritoneal Fluid         Significant Diagnostic Studies: Labs:   CMP   Recent Labs   Lab  09/26/18   0537  09/27/18   0453   NA  127*  129*   K  3.4*  3.0*   CL  91*  94*   CO2  22*  25   GLU  159*  89   BUN  84*  83*   CREATININE  1.1  1.1   CALCIUM  9.8  9.3   PROT  6.5  5.8*   ALBUMIN  4.1  3.9   BILITOT  2.0*  1.9*   ALKPHOS  323*  266*   AST  121*  87*   ALT  82*  62*   ANIONGAP  14  10   ESTGFRAFRICA  59.2*  59.2*   EGFRNONAA  51.3*  51.3*   , CBC   Recent Labs   Lab  09/26/18   0537  09/27/18   0453   WBC  5.22  2.77*   HGB  9.9*  7.9*   HCT  28.7*  23.0*   PLT  77*  64*    and INR   Lab Results   Component Value Date    INR 1.1 09/27/2018    INR 1.0 09/26/2018    INR 1.1 09/25/2018       The patients clinical status was discussed at multidisplinary rounds, involving transplant surgery, transplant medicine,  "pharmacy, nursing, nutrition, and social work    Discharged Condition: good    Disposition: Home or Self Care    Follow Up:    Patient Instructions:      Notify your health care provider if you experience any of the following:  temperature >100.4     Notify your health care provider if you experience any of the following:  persistent nausea and vomiting or diarrhea     Notify your health care provider if you experience any of the following:  increased confusion or weakness     Notify your health care provider if you experience any of the following:  persistent dizziness, light-headedness, or visual disturbances     Notify your health care provider if you experience any of the following:  worsening rash     Notify your health care provider if you experience any of the following:  severe persistent headache     Notify your health care provider if you experience any of the following:  difficulty breathing or increased cough     Notify your health care provider if you experience any of the following:  severe uncontrolled pain     No dressing needed     Activity as tolerated     Medications:  Reconciled Home Medications:      Medication List      CHANGE how you take these medications    insulin aspart U-100 100 unit/mL Inpn pen  Commonly known as:  NovoLOG Flexpen U-100 Insulin  Inject 20 units into the skin with breakfast,17 units with lunch, and 17 with dinner Plus correction scale, max TDD 60 units daily  What changed:    · how much to take  · how to take this  · when to take this  · additional instructions     insulin glargine 100 unit/mL injection  Commonly known as:  LANTUS  Inject 20 Units into the skin every evening.  What changed:  how much to take        CONTINUE taking these medications    ACCU-CHEK DC Strp  Generic drug:  blood sugar diagnostic  1 strip by Misc.(Non-Drug; Combo Route) route 3 (three) times daily.     BD ULTRA-FINE CITLALI PEN NEEDLE 32 gauge x 5/32" Ndle  Generic drug:  pen needle, " diabetic  To use to inject insulin 4x daily     entecavir 0.5 MG Tab  Commonly known as:  BARACLUDE  Take 1 tablet (0.5 mg total) by mouth once daily.     lactulose 10 gram/15 mL solution  Commonly known as:  CHRONULAC  Take 30 mLs (20 g total) by mouth 3 (three) times daily. Adjust dose to have 4 bowel movements daily     midodrine 5 MG Tab  Commonly known as:  PROAMATINE  Take 3 tablets (15 mg total) by mouth every 8 (eight) hours.     OMEGA-3 FATTY ACIDS-EPA ORAL  Take by mouth.     sodium bicarbonate 650 MG tablet  Take 2 tablets (1,300 mg total) by mouth 3 (three) times daily.     VITAMIN D ORAL  Take by mouth.     XIFAXAN 550 mg Tab  Generic drug:  rifAXIMin  Take 1 tablet (550 mg total) by mouth 2 (two) times daily.        STOP taking these medications    cefpodoxime 200 MG tablet  Commonly known as:  VANTIN          Time spent caring for patient (Greater than 1/2 spent in direct face-to-face contact): > 30 minutes    Seble Tineo NP  Liver Transplant  [facility]

## 2018-09-27 NOTE — PLAN OF CARE
Problem: Spiritual Distress, Risk/Actual (Adult,Obstetrics,Pediatric)  Intervention: Facilitate Personal Exploration/Expression of Spirituality  Provided f/u visit with pt, family, and  Dunia. Pt and family made aware of 's presence as needed. No spiritual needs expressed. Pt and family know to contact international dept if needing  support.

## 2018-09-28 ENCOUNTER — DOCUMENTATION ONLY (OUTPATIENT)
Dept: TRANSPLANT | Facility: CLINIC | Age: 68
End: 2018-09-28

## 2018-09-28 LAB — BACTERIA SPEC ANAEROBE CULT: NORMAL

## 2018-09-28 NOTE — NURSING
Met with patient, , Krystian, and Daughter Susan who flew in from [place] yesterday.  Reviewed previously taught concepts such as being called in for transplant and back up liver transplant offers.  Also reviewed ongoing plan of care:  Rabbi Daly was present to interpret  1. Twice weekly paracentesis  2.. Weekly labs  3.  NP appts weekly  4. Low na diet  5. Fluid restriction  6. Following diabetes diet.     Patient and family receptive to information and agree to plan.   Patient in good spirits. Ambulated out of office.

## 2018-10-01 ENCOUNTER — HOSPITAL ENCOUNTER (OUTPATIENT)
Dept: INTERVENTIONAL RADIOLOGY/VASCULAR | Facility: HOSPITAL | Age: 68
Discharge: HOME | End: 2018-10-01
Attending: INTERNAL MEDICINE
Payer: COMMERCIAL

## 2018-10-01 ENCOUNTER — HOSPITAL ENCOUNTER (INPATIENT)
Facility: HOSPITAL | Age: 68
LOS: 80 days | Discharge: HOME HEALTH | DRG: 005 | End: 2018-12-21
Attending: EMERGENCY MEDICINE | Admitting: TRANSPLANT SURGERY
Payer: COMMERCIAL

## 2018-10-01 ENCOUNTER — TELEPHONE (OUTPATIENT)
Dept: TRANSPLANT | Facility: CLINIC | Age: 68
End: 2018-10-01

## 2018-10-01 VITALS
DIASTOLIC BLOOD PRESSURE: 50 MMHG | SYSTOLIC BLOOD PRESSURE: 96 MMHG | HEART RATE: 88 BPM | RESPIRATION RATE: 18 BRPM | OXYGEN SATURATION: 98 %

## 2018-10-01 DIAGNOSIS — E87.20 METABOLIC ACIDOSIS: ICD-10-CM

## 2018-10-01 DIAGNOSIS — Z79.60 LONG-TERM USE OF IMMUNOSUPPRESSANT MEDICATION: ICD-10-CM

## 2018-10-01 DIAGNOSIS — R07.9 CHEST PAIN: ICD-10-CM

## 2018-10-01 DIAGNOSIS — K26.9 DUODENAL ULCER: ICD-10-CM

## 2018-10-01 DIAGNOSIS — I95.9 HYPOTENSION, UNSPECIFIED HYPOTENSION TYPE: ICD-10-CM

## 2018-10-01 DIAGNOSIS — T81.89XA DELAYED SURGICAL WOUND HEALING, INITIAL ENCOUNTER: ICD-10-CM

## 2018-10-01 DIAGNOSIS — Z91.89 AT RISK FOR OPPORTUNISTIC INFECTIONS: ICD-10-CM

## 2018-10-01 DIAGNOSIS — R23.9 ALTERATION IN SKIN INTEGRITY: ICD-10-CM

## 2018-10-01 DIAGNOSIS — Z76.82 LIVER TRANSPLANT CANDIDATE: ICD-10-CM

## 2018-10-01 DIAGNOSIS — D70.8 OTHER NEUTROPENIA: ICD-10-CM

## 2018-10-01 DIAGNOSIS — K72.10 CHRONIC LIVER FAILURE WITHOUT HEPATIC COMA: ICD-10-CM

## 2018-10-01 DIAGNOSIS — R79.89 AZOTEMIA: ICD-10-CM

## 2018-10-01 DIAGNOSIS — R06.01 SLEEPS IN SITTING POSITION DUE TO ORTHOPNEA: ICD-10-CM

## 2018-10-01 DIAGNOSIS — D64.9 ANEMIA REQUIRING TRANSFUSIONS: ICD-10-CM

## 2018-10-01 DIAGNOSIS — K74.60 CHRONIC HEPATITIS B WITH DELTA AGENT WITH CIRRHOSIS: ICD-10-CM

## 2018-10-01 DIAGNOSIS — I77.1 STENOSIS OF HEPATIC ARTERY OF TRANSPLANTED LIVER: ICD-10-CM

## 2018-10-01 DIAGNOSIS — R06.01 ORTHOPNEA: ICD-10-CM

## 2018-10-01 DIAGNOSIS — J90 PLEURAL EFFUSION: ICD-10-CM

## 2018-10-01 DIAGNOSIS — D72.819 LEUKOPENIA, UNSPECIFIED TYPE: ICD-10-CM

## 2018-10-01 DIAGNOSIS — Z79.4 TYPE 2 DIABETES MELLITUS WITH DIABETIC POLYNEUROPATHY, WITH LONG-TERM CURRENT USE OF INSULIN: ICD-10-CM

## 2018-10-01 DIAGNOSIS — D62 ACUTE BLOOD LOSS ANEMIA: ICD-10-CM

## 2018-10-01 DIAGNOSIS — R53.81 PHYSICAL DECONDITIONING: ICD-10-CM

## 2018-10-01 DIAGNOSIS — E87.6 HYPOKALEMIA: ICD-10-CM

## 2018-10-01 DIAGNOSIS — E68 SEQUELAE OF HYPERALIMENTATION: ICD-10-CM

## 2018-10-01 DIAGNOSIS — T38.0X5D ADVERSE EFFECT OF CORTICOSTEROIDS, SUBSEQUENT ENCOUNTER: ICD-10-CM

## 2018-10-01 DIAGNOSIS — E88.09 HYPOALBUMINEMIA: ICD-10-CM

## 2018-10-01 DIAGNOSIS — R53.1 WEAKNESS: ICD-10-CM

## 2018-10-01 DIAGNOSIS — E87.70 HYPERVOLEMIA ASSOCIATED WITH RENAL INSUFFICIENCY: ICD-10-CM

## 2018-10-01 DIAGNOSIS — E83.39 HYPOPHOSPHATEMIA: ICD-10-CM

## 2018-10-01 DIAGNOSIS — T86.49 HEPATIC ARTERY THROMBOSIS OF TRANSPLANTED LIVER: ICD-10-CM

## 2018-10-01 DIAGNOSIS — D69.6 THROMBOCYTOPENIA, UNSPECIFIED: ICD-10-CM

## 2018-10-01 DIAGNOSIS — R00.0 TACHYCARDIA: ICD-10-CM

## 2018-10-01 DIAGNOSIS — T38.0X5A ADRENAL CORTICAL STEROIDS CAUSING ADVERSE EFFECT IN THERAPEUTIC USE: ICD-10-CM

## 2018-10-01 DIAGNOSIS — E87.1 HYPONATREMIA: ICD-10-CM

## 2018-10-01 DIAGNOSIS — E83.42 HYPOMAGNESEMIA: ICD-10-CM

## 2018-10-01 DIAGNOSIS — K76.82 HEPATIC ENCEPHALOPATHY: ICD-10-CM

## 2018-10-01 DIAGNOSIS — E43 SEVERE MALNUTRITION: ICD-10-CM

## 2018-10-01 DIAGNOSIS — N17.9 AKI (ACUTE KIDNEY INJURY): ICD-10-CM

## 2018-10-01 DIAGNOSIS — R18.8 OTHER ASCITES: ICD-10-CM

## 2018-10-01 DIAGNOSIS — Z29.89 PROPHYLACTIC IMMUNOTHERAPY: ICD-10-CM

## 2018-10-01 DIAGNOSIS — D61.811 OTHER DRUG-INDUCED PANCYTOPENIA: ICD-10-CM

## 2018-10-01 DIAGNOSIS — N18.30 CHRONIC KIDNEY DISEASE, STAGE III (MODERATE): ICD-10-CM

## 2018-10-01 DIAGNOSIS — N17.0 ACUTE RENAL FAILURE WITH TUBULAR NECROSIS: ICD-10-CM

## 2018-10-01 DIAGNOSIS — B18.0 CHRONIC HEPATITIS B WITH DELTA AGENT WITH CIRRHOSIS: ICD-10-CM

## 2018-10-01 DIAGNOSIS — N17.9 ACUTE RENAL FAILURE, UNSPECIFIED ACUTE RENAL FAILURE TYPE: ICD-10-CM

## 2018-10-01 DIAGNOSIS — T38.0X5S ADVERSE EFFECT OF CORTICOSTEROIDS, SEQUELA: ICD-10-CM

## 2018-10-01 DIAGNOSIS — T38.0X5A ADVERSE EFFECT OF CORTICOSTEROIDS, INITIAL ENCOUNTER: ICD-10-CM

## 2018-10-01 DIAGNOSIS — T81.89XD DELAYED SURGICAL WOUND HEALING, SUBSEQUENT ENCOUNTER: ICD-10-CM

## 2018-10-01 DIAGNOSIS — E11.42 TYPE 2 DIABETES MELLITUS WITH DIABETIC POLYNEUROPATHY, WITH LONG-TERM CURRENT USE OF INSULIN: ICD-10-CM

## 2018-10-01 DIAGNOSIS — I89.8 CHYLOUS ASCITES: ICD-10-CM

## 2018-10-01 DIAGNOSIS — N39.0 URINARY TRACT INFECTION WITHOUT HEMATURIA, SITE UNSPECIFIED: ICD-10-CM

## 2018-10-01 DIAGNOSIS — E87.79 OTHER HYPERVOLEMIA: ICD-10-CM

## 2018-10-01 DIAGNOSIS — N28.9 HYPERVOLEMIA ASSOCIATED WITH RENAL INSUFFICIENCY: ICD-10-CM

## 2018-10-01 DIAGNOSIS — T86.49 STENOSIS OF HEPATIC ARTERY OF TRANSPLANTED LIVER: ICD-10-CM

## 2018-10-01 DIAGNOSIS — Z94.4 LIVER TRANSPLANTED: Primary | ICD-10-CM

## 2018-10-01 DIAGNOSIS — I74.8 HEPATIC ARTERY THROMBOSIS OF TRANSPLANTED LIVER: ICD-10-CM

## 2018-10-01 DIAGNOSIS — R23.9 ALTERATION IN SKIN INTEGRITY DUE TO MOISTURE: ICD-10-CM

## 2018-10-01 DIAGNOSIS — Z99.11 ENCOUNTER FOR WEANING FROM VENTILATOR: ICD-10-CM

## 2018-10-01 DIAGNOSIS — D61.811 DRUG-INDUCED PANCYTOPENIA: ICD-10-CM

## 2018-10-01 LAB
BACTERIA FLD AEROBE CULT: NO GROWTH
GRAM STN SPEC: NORMAL
GRAM STN SPEC: NORMAL
POCT GLUCOSE: 129 MG/DL (ref 70–110)
POCT GLUCOSE: 212 MG/DL (ref 70–110)
POCT GLUCOSE: 218 MG/DL (ref 70–110)
POCT GLUCOSE: 224 MG/DL (ref 70–110)

## 2018-10-01 PROCEDURE — 49083 ABD PARACENTESIS W/IMAGING: CPT

## 2018-10-01 PROCEDURE — 99283 EMERGENCY DEPT VISIT LOW MDM: CPT | Mod: NTX,,, | Performed by: EMERGENCY MEDICINE

## 2018-10-01 PROCEDURE — 96361 HYDRATE IV INFUSION ADD-ON: CPT | Mod: NTX

## 2018-10-01 PROCEDURE — G0378 HOSPITAL OBSERVATION PER HR: HCPCS | Mod: NTX

## 2018-10-01 PROCEDURE — A7048 VACUUM DRAIN BOTTLE/TUBE KIT: HCPCS

## 2018-10-01 PROCEDURE — 96360 HYDRATION IV INFUSION INIT: CPT | Mod: NTX

## 2018-10-01 PROCEDURE — 25000003 PHARM REV CODE 250: Mod: NTX | Performed by: NURSE PRACTITIONER

## 2018-10-01 PROCEDURE — 49083 ABD PARACENTESIS W/IMAGING: CPT | Mod: NTX,,, | Performed by: RADIOLOGY

## 2018-10-01 PROCEDURE — 63600175 PHARM REV CODE 636 W HCPCS: Mod: JG,NTX | Performed by: STUDENT IN AN ORGANIZED HEALTH CARE EDUCATION/TRAINING PROGRAM

## 2018-10-01 PROCEDURE — 63600175 PHARM REV CODE 636 W HCPCS: Mod: JG | Performed by: INTERNAL MEDICINE

## 2018-10-01 PROCEDURE — 63600175 PHARM REV CODE 636 W HCPCS: Mod: NTX | Performed by: NURSE PRACTITIONER

## 2018-10-01 PROCEDURE — 96372 THER/PROPH/DIAG INJ SC/IM: CPT | Mod: NTX

## 2018-10-01 PROCEDURE — 99214 OFFICE O/P EST MOD 30 MIN: CPT | Mod: NTX,,, | Performed by: NURSE PRACTITIONER

## 2018-10-01 PROCEDURE — P9045 ALBUMIN (HUMAN), 5%, 250 ML: HCPCS | Mod: JG,NTX | Performed by: STUDENT IN AN ORGANIZED HEALTH CARE EDUCATION/TRAINING PROGRAM

## 2018-10-01 PROCEDURE — P9047 ALBUMIN (HUMAN), 25%, 50ML: HCPCS | Mod: JG | Performed by: INTERNAL MEDICINE

## 2018-10-01 PROCEDURE — 99285 EMERGENCY DEPT VISIT HI MDM: CPT | Mod: NTX,25

## 2018-10-01 RX ORDER — SODIUM BICARBONATE 650 MG/1
1300 TABLET ORAL 3 TIMES DAILY
Status: DISCONTINUED | OUTPATIENT
Start: 2018-10-01 | End: 2018-10-04

## 2018-10-01 RX ORDER — HEPARIN SODIUM 5000 [USP'U]/ML
5000 INJECTION, SOLUTION INTRAVENOUS; SUBCUTANEOUS EVERY 8 HOURS
Status: DISCONTINUED | OUTPATIENT
Start: 2018-10-01 | End: 2018-10-17

## 2018-10-01 RX ORDER — ALBUMIN HUMAN 250 G/1000ML
25 SOLUTION INTRAVENOUS
Status: DISCONTINUED | OUTPATIENT
Start: 2018-10-01 | End: 2018-10-02 | Stop reason: HOSPADM

## 2018-10-01 RX ORDER — ONDANSETRON 8 MG/1
8 TABLET, ORALLY DISINTEGRATING ORAL EVERY 8 HOURS PRN
Status: DISCONTINUED | OUTPATIENT
Start: 2018-10-01 | End: 2018-12-21 | Stop reason: HOSPADM

## 2018-10-01 RX ORDER — ALBUMIN HUMAN 50 G/1000ML
50 SOLUTION INTRAVENOUS ONCE
Status: COMPLETED | OUTPATIENT
Start: 2018-10-01 | End: 2018-10-01

## 2018-10-01 RX ORDER — SODIUM CHLORIDE 0.9 % (FLUSH) 0.9 %
3 SYRINGE (ML) INJECTION
Status: DISCONTINUED | OUTPATIENT
Start: 2018-10-01 | End: 2018-12-21 | Stop reason: HOSPADM

## 2018-10-01 RX ORDER — ENTECAVIR 0.5 MG/1
0.5 TABLET, FILM COATED ORAL DAILY
Status: DISCONTINUED | OUTPATIENT
Start: 2018-10-02 | End: 2018-10-08

## 2018-10-01 RX ORDER — LIDOCAINE HYDROCHLORIDE 10 MG/ML
1 INJECTION, SOLUTION EPIDURAL; INFILTRATION; INTRACAUDAL; PERINEURAL ONCE
Status: DISCONTINUED | OUTPATIENT
Start: 2018-10-01 | End: 2018-10-07

## 2018-10-01 RX ORDER — MIDODRINE HYDROCHLORIDE 5 MG/1
15 TABLET ORAL EVERY 8 HOURS
Status: DISCONTINUED | OUTPATIENT
Start: 2018-10-01 | End: 2018-10-05

## 2018-10-01 RX ORDER — INSULIN ASPART 100 [IU]/ML
17 INJECTION, SOLUTION INTRAVENOUS; SUBCUTANEOUS
Status: DISCONTINUED | OUTPATIENT
Start: 2018-10-01 | End: 2018-10-05

## 2018-10-01 RX ORDER — BUTALBITAL, ACETAMINOPHEN AND CAFFEINE 50; 325; 40 MG/1; MG/1; MG/1
1 TABLET ORAL
Status: DISPENSED | OUTPATIENT
Start: 2018-10-01 | End: 2018-10-02

## 2018-10-01 RX ORDER — ERGOCALCIFEROL 1.25 MG/1
50000 CAPSULE ORAL
Status: DISCONTINUED | OUTPATIENT
Start: 2018-10-02 | End: 2018-10-27

## 2018-10-01 RX ADMIN — SODIUM BICARBONATE 650 MG TABLET 1300 MG: at 09:10

## 2018-10-01 RX ADMIN — ALBUMIN (HUMAN) 12.5 G: 25 SOLUTION INTRAVENOUS at 11:10

## 2018-10-01 RX ADMIN — HEPARIN SODIUM 5000 UNITS: 5000 INJECTION, SOLUTION INTRAVENOUS; SUBCUTANEOUS at 09:10

## 2018-10-01 RX ADMIN — INSULIN DETEMIR 20 UNITS: 100 INJECTION, SOLUTION SUBCUTANEOUS at 09:10

## 2018-10-01 RX ADMIN — MIDODRINE HYDROCHLORIDE 15 MG: 5 TABLET ORAL at 10:10

## 2018-10-01 RX ADMIN — ALBUMIN (HUMAN) 25 G: 25 SOLUTION INTRAVENOUS at 11:10

## 2018-10-01 RX ADMIN — INSULIN ASPART 17 UNITS: 100 INJECTION, SOLUTION INTRAVENOUS; SUBCUTANEOUS at 05:10

## 2018-10-01 RX ADMIN — RIFAXIMIN 550 MG: 550 TABLET ORAL at 09:10

## 2018-10-01 RX ADMIN — ALBUMIN HUMAN 50 G: 0.05 INJECTION, SOLUTION INTRAVENOUS at 09:10

## 2018-10-01 NOTE — PROCEDURES
Radiology Post-Procedure Note    Pre Op Diagnosis: Ascites  Post Op Diagnosis: Same    Procedure: Paracentesis    Procedure performed by: Dr. John Torrez    Written Informed Consent Obtained: Yes  Specimen Removed: YES (5 L ascites)  Estimated Blood Loss: Minimal    Findings:    Successful paracentesis.  Albumin administered PRN per protocol.    Patient tolerated procedure well.    James Rondon MD  Radiology, PGY - III  [phone]

## 2018-10-01 NOTE — SUBJECTIVE & OBJECTIVE
Scheduled Meds:   [START ON 10/2/2018] entecavir  0.5 mg Oral Daily    [START ON 10/2/2018] ergocalciferol  50,000 Units Oral Q7 Days    heparin (porcine)  5,000 Units Subcutaneous Q8H    insulin aspart U-100  17 Units Subcutaneous TIDWM    insulin detemir U-100  20 Units Subcutaneous QHS    lidocaine (PF) 10 mg/ml (1%)  1 mL Other Once    midodrine  15 mg Oral Q8H    rifAXIMin  550 mg Oral BID    sodium bicarbonate  1,300 mg Oral TID     Continuous Infusions:  PRN Meds:ondansetron, promethazine (PHENERGAN) IVPB, sodium chloride 0.9%    Review of Systems   Constitutional: Positive for activity change, appetite change and fatigue. Negative for chills, diaphoresis and fever.   Respiratory: Negative for cough and shortness of breath.    Cardiovascular: Positive for leg swelling. Negative for palpitations.   Gastrointestinal: Negative for abdominal distention, abdominal pain, constipation, diarrhea, nausea and vomiting.   Genitourinary: Negative for decreased urine volume and dysuria.   Musculoskeletal: Negative for arthralgias and back pain.   Neurological: Positive for weakness. Negative for tremors.   Psychiatric/Behavioral: Negative for confusion.     Objective:     Vital Signs (Most Recent):  Temp: 97.7 °F (36.5 °C) (10/01/18 1450)  Pulse: 86 (10/01/18 1549)  Resp: 15 (10/01/18 1555)  BP: (!) 100/47 (10/01/18 1547)  SpO2: 99 % (10/01/18 1549) Vital Signs (24h Range):  Temp:  [97.7 °F (36.5 °C)] 97.7 °F (36.5 °C)  Pulse:  [82-88] 86  Resp:  [15-18] 15  SpO2:  [98 %-100 %] 99 %  BP: ()/(41-54) 100/47     Weight: 55 kg (121 lb 4.1 oz)  Body mass index is 20.96 kg/m².    Intake/Output - Last 3 Shifts     None          Physical Exam   Constitutional: She is oriented to person, place, and time. She appears well-developed. No distress.   Temporal and distal extremity muscle wasting   Eyes: No scleral icterus.   Cardiovascular: Normal rate, regular rhythm and normal heart sounds.   Pulmonary/Chest: Effort  normal and breath sounds normal. She has no rales.   Abdominal: Soft. Bowel sounds are normal. She exhibits distension (ascites). There is no tenderness.   Musculoskeletal: She exhibits edema.   Neurological: She is alert and oriented to person, place, and time.   Skin: Skin is warm and dry. She is not diaphoretic.   Psychiatric: She has a normal mood and affect. Her behavior is normal. Judgment and thought content normal.   Nursing note and vitals reviewed.      Laboratory:  Immunosuppressants     None        CBC:   Recent Labs   Lab  10/01/18   1225   WBC  4.12   RBC  2.66*   HGB  8.5*   HCT  24.1*   PLT  73*   MCV  91   MCH  32.0*   MCHC  35.3     CMP:   Recent Labs   Lab  10/01/18   1225   GLU  171*   CALCIUM  9.6   ALBUMIN  4.1   PROT  6.3   NA  119*   K  3.3*   CO2  24   CL  79*   BUN  106*   CREATININE  1.5*   ALKPHOS  332*   ALT  84*   AST  121*   BILITOT  1.9*     Labs within the past 24 hours have been reviewed.    Diagnostic Results:  None

## 2018-10-01 NOTE — HPI
Ms. Reddy is a 69 y/o female with PMH of ESLD secondary Hep B and D.  Listed for liver transplant with MELD 23.  Paracentesis 10/1 with 5L removed, no fluid sent for analysis.  Morning labs significant for hyponatremia, Na 119.  Pt admitted to LTS for sodium monitoring.    69 y/o F h/o HBV/delta cirrhosis s/p DBDLT 10/5/18 (CMV D+/R+, steroid induction, MMF/tacro/pred maintenance) with take back on 10/6 2/2 hyperbilirubinema and bilious VALORIE drainage, no leak identified. Post-op course c/b hypercapneic and hypoxic respiratory failure and was reintubated though quickly extubated now doing well. Liver bx (10/6) sig for Zone 3 hemorrhage and collapse, in addition to cholestasis. Transferred from ICU on POD #4. ID consulted to comment on positive diphtheroid culture from ascitic fluid (likely skin colonization) as well as ESBL Klebsiella pneumoniae in urine culture (10/4).  Pt asymptomatic and cell count from ascitic fluid unremarkable. Per ID recs, no need to treat diphtheroids or asymptomatic ESBL Kleb pneumo bacteriuria though pt has had 6 days of therapy which would cover these organisms. Mild peritransplant ascites- repeat US 10/6 with increased arterial resistive indices. Repeat US 10/9 w/ continued elevation in RIs and fluid collections. LFTs trending down nicely.

## 2018-10-01 NOTE — DISCHARGE INSTRUCTIONS
For scheduling: Call Elizabeth [phone]    For questions or concerns call: MIHAI MON-FRI 8 AM- 5PM [phone].   After hours call Radiology resident on call at [phone].    For immediate concerns that are not emergent, you may call our radiology clinic at: [phone]

## 2018-10-01 NOTE — TELEPHONE ENCOUNTER
[facility]  [facility] #: [id number]    Lab Results   Component Value Date    CREATININE 1.5 (H) 10/01/2018     (LL) 10/01/2018    BILITOT 1.9 (H) 10/01/2018    ALBUMIN 4.1 10/01/2018    INR 1.2 10/01/2018       Encephalopathy: 1 - 2  Ascites: moderate  Dialysis: no    MELD 25

## 2018-10-01 NOTE — PROGRESS NOTES
Paracentesis complete. 5000 mLs peritoneal fluid drained. Pt tolerated well. Dressing to llq clean, dry, and intact. Albumin 25% given 150 mLs.  Discharge instructions given to  and reviewed with patient. Pt escorted to lobby via wheelchair.

## 2018-10-01 NOTE — H&P
[facility]  Liver Transplant  History & Physical    Patient Name: Scarlett Reddy  MRN: [id number]  Admission Date: 10/1/2018  Code Status: Full Code  Primary Care Provider: Primary Doctor No    Subjective:     History of Present Illness:  69 y/o female with pmh of esld secondary hep b & d.  Listed for liver transplant with meld 23.  Had paracentesis today 5L removed, labs pending.  Morning labs significant for hyponatremia.  Being admitted to monitor.    Scheduled Meds:   [START ON 10/2/2018] entecavir  0.5 mg Oral Daily    [START ON 10/2/2018] ergocalciferol  50,000 Units Oral Q7 Days    heparin (porcine)  5,000 Units Subcutaneous Q8H    insulin aspart U-100  17 Units Subcutaneous TIDWM    insulin detemir U-100  20 Units Subcutaneous QHS    lidocaine (PF) 10 mg/ml (1%)  1 mL Other Once    midodrine  15 mg Oral Q8H    rifAXIMin  550 mg Oral BID    sodium bicarbonate  1,300 mg Oral TID     Continuous Infusions:  PRN Meds:ondansetron, promethazine (PHENERGAN) IVPB, sodium chloride 0.9%    Review of Systems   Constitutional: Positive for activity change, appetite change and fatigue. Negative for chills, diaphoresis and fever.   Respiratory: Negative for cough and shortness of breath.    Cardiovascular: Positive for leg swelling. Negative for palpitations.   Gastrointestinal: Negative for abdominal distention, abdominal pain, constipation, diarrhea, nausea and vomiting.   Genitourinary: Negative for decreased urine volume and dysuria.   Musculoskeletal: Negative for arthralgias and back pain.   Neurological: Positive for weakness. Negative for tremors.   Psychiatric/Behavioral: Negative for confusion.     Objective:     Vital Signs (Most Recent):  Temp: 97.7 °F (36.5 °C) (10/01/18 1450)  Pulse: 86 (10/01/18 1549)  Resp: 15 (10/01/18 1555)  BP: (!) 100/47 (10/01/18 1547)  SpO2: 99 % (10/01/18 1549) Vital Signs (24h Range):  Temp:  [97.7 °F (36.5 °C)] 97.7 °F (36.5 °C)  Pulse:  [82-88] 86  Resp:   [15-18] 15  SpO2:  [98 %-100 %] 99 %  BP: ()/(41-54) 100/47     Weight: 55 kg (121 lb 4.1 oz)  Body mass index is 20.96 kg/m².    Intake/Output - Last 3 Shifts     None          Physical Exam   Constitutional: She is oriented to person, place, and time. She appears well-developed. No distress.   Temporal and distal extremity muscle wasting   Eyes: No scleral icterus.   Cardiovascular: Normal rate, regular rhythm and normal heart sounds.   Pulmonary/Chest: Effort normal and breath sounds normal. She has no rales.   Abdominal: Soft. Bowel sounds are normal. She exhibits distension (ascites). There is no tenderness.   Musculoskeletal: She exhibits edema.   Neurological: She is alert and oriented to person, place, and time.   Skin: Skin is warm and dry. She is not diaphoretic.   Psychiatric: She has a normal mood and affect. Her behavior is normal. Judgment and thought content normal.   Nursing note and vitals reviewed.      Laboratory:  Immunosuppressants     None        CBC:   Recent Labs   Lab  10/01/18   1225   WBC  4.12   RBC  2.66*   HGB  8.5*   HCT  24.1*   PLT  73*   MCV  91   MCH  32.0*   MCHC  35.3     CMP:   Recent Labs   Lab  10/01/18   1225   GLU  171*   CALCIUM  9.6   ALBUMIN  4.1   PROT  6.3   NA  119*   K  3.3*   CO2  24   CL  79*   BUN  106*   CREATININE  1.5*   ALKPHOS  332*   ALT  84*   AST  121*   BILITOT  1.9*     Labs within the past 24 hours have been reviewed.    Diagnostic Results:  None    Assessment/Plan:     Hyponatremia    - fluid restriction  - low sodium diet  - keep blood sugars well controlled          Ascites    - paracentesis 10/1, 5L removed, labs pending          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    - listed for liver transplant, on hold due to hyponatremia  MELD-Na score: 25 at 10/1/2018 12:25 PM  MELD score: 15 at 10/1/2018 12:25 PM  Calculated from:  Serum Creatinine: 1.5 mg/dL at 10/1/2018 12:25 PM  Serum Sodium: 119 mmol/L (Rounded to 125 mmol/L) at  10/1/2018 12:25 PM  Total Bilirubin: 1.9 mg/dL at 10/1/2018 12:25 PM  INR(ratio): 1.2 at 10/1/2018 12:25 PM  Age: 68 years          Type 2 diabetes mellitus with diabetic polyneuropathy, with long-term current use of insulin    - continue home regimen  - endocrine consulted          Hepatic encephalopathy    Grade 1  - continue lactulose and rifaximin  - monitor                Admit to LTS  - fluid restriction, blood sugar control  - liver txp on hold while hyponatremic  - above discussed with Dr. Matson  No Patient Care Coordination Note on file.      Seble Tineo NP  Liver Transplant  [facility]

## 2018-10-01 NOTE — ED TRIAGE NOTES
Pt brought in via family due to low sodium. Pt is pending liver transplant. Per family pt has been having cramping in her hands. Pt had a paracentesis today removing 5 L and has been feeling weak. Pt also had a paracenteses on Friday and removed 6.1L.

## 2018-10-01 NOTE — ED PROVIDER NOTES
Encounter Date: 10/1/2018  SCRIBE #1 NOTE: I, Jani Hess, am scribing under direction of, and in the presence of,  Dr. Jody Brennan.        History     Chief Complaint   Patient presents with    Abnormal Lab     sodium 119 pre liver xplant     Mrs. Reddy is a 67 y/o F with PMHx of cirhhosis, liver failure, hepatic encephalopathy, chronic hepatitis B and delta and TIIDM, ascites, hypotension and hyponatremia who presents today after a paracentesis and lab work revealing a sodium of 119. Pt is a liver transplant candidate and is on the transplant list. Pt has no complaints of pain at this time. Pt endorses fatigue and some nausea this morning after taking her morning meds. Pt had 5L removed during her paracentesis. She has not had a BM yet today, despite taking her lactulose. Pt states she is compliant with her fluid restriction. Pt is staying at the [facility].    MELD-Na score: 25 at 10/1/2018 12:25 PM  MELD score: 15 at 10/1/2018 12:25 PM  Calculated from:  Serum Creatinine: 1.5 mg/dL at 10/1/2018 12:25 PM  Serum Sodium: 119 mmol/L (Rounded to 125 mmol/L) at 10/1/2018 12:25 PM  Total Bilirubin: 1.9 mg/dL at 10/1/2018 12:25 PM  INR(ratio): 1.2 at 10/1/2018 12:25 PM  Age: 68 years        The history is provided by the patient, the spouse, a relative and medical records. The history is limited by a language barrier. A  was used ([place] Zealander  in the room with the patient).     Review of patient's allergies indicates:  No Known Allergies  Past Medical History:   Diagnosis Date    Anemia requiring transfusions 8/16/2018    Ascites     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 8/15/2018    Cirrhosis     Cough 8/30/2018    Esophageal varices     Esophageal varices without bleeding 8/15/2018    Hepatic encephalopathy     Hepatitis B     Hepatitis D virus infection     Hypersplenism     Hyponatremia     Hypotension     Liver transplant candidate      Neutropenia     Portal hypertension     Severe malnutrition 8/16/2018    Type 2 diabetes mellitus, with long-term current use of insulin 8/15/2018     Past Surgical History:   Procedure Laterality Date    EGD (ESOPHAGOGASTRODUODENOSCOPY) N/A 8/17/2018    Performed by Travon Delgadillo MD at [facility] (2ND FLR)    ESOPHAGOGASTRODUODENOSCOPY N/A 8/17/2018    Procedure: EGD (ESOPHAGOGASTRODUODENOSCOPY);  Surgeon: Travon Delgadillo MD;  Location: [facility] ([address] [place] [address]);  Service: Endoscopy;  Laterality: N/A;     No family history on file.  Social History     Tobacco Use    Smoking status: Never Smoker    Smokeless tobacco: Never Used   Substance Use Topics    Alcohol use: No     Frequency: Never    Drug use: No     Review of Systems   Constitutional: Positive for activity change, fatigue and unexpected weight change. Negative for chills and fever.   HENT: Negative.    Eyes: Negative.    Respiratory: Negative.    Cardiovascular: Negative.    Gastrointestinal: Positive for abdominal distention (was worse prior to paracentesis), constipation (no BM today), nausea (with morning meds) and vomiting (with morning meds). Negative for abdominal pain and diarrhea.   Endocrine: Positive for cold intolerance. Negative for polyuria.   Genitourinary: Negative for decreased urine volume, difficulty urinating, flank pain, frequency, hematuria and urgency.   Musculoskeletal: Negative.    Skin: Negative.    Allergic/Immunologic: Negative.    Neurological: Positive for weakness. Negative for dizziness, speech difficulty and headaches.   Hematological: Bruises/bleeds easily.   Psychiatric/Behavioral: Negative.        Physical Exam     Initial Vitals [10/01/18 1450]   BP Pulse Resp Temp SpO2   (!) 89/41 82 18 97.7 °F (36.5 °C) 100 %      MAP       --         Physical Exam    Nursing note and vitals reviewed.  Constitutional: She appears well-developed and well-nourished. She is not diaphoretic. No distress.   HENT:   Head: Normocephalic and  atraumatic.   Eyes: Conjunctivae and EOM are normal. Pupils are equal, round, and reactive to light.   Neck: Normal range of motion. Neck supple. JVD present.   Cardiovascular: Normal rate, regular rhythm, normal heart sounds and intact distal pulses.   Pulmonary/Chest: Breath sounds normal. No stridor. No respiratory distress. She exhibits no tenderness.   Abdominal: Soft. Bowel sounds are normal. She exhibits distension. There is no tenderness. There is no guarding.   Musculoskeletal: Normal range of motion. She exhibits no edema or tenderness.   Muscle wasting ntoed   Neurological: She is alert and oriented to person, place, and time. She has normal strength. GCS score is 15. GCS eye subscore is 4. GCS verbal subscore is 5. GCS motor subscore is 6.   Skin: Skin is warm and dry.   Bruising noted to LLQ. Bandage from today's paracentesis to RLQ.   Psychiatric: She has a normal mood and affect. Her behavior is normal. Judgment and thought content normal.         ED Course   Procedures  Labs Reviewed   POCT GLUCOSE - Abnormal; Notable for the following components:       Result Value    POCT Glucose 224 (*)     All other components within normal limits          Imaging Results    None          Medical Decision Making:   Initial Assessment:   Mrs. Reddy is a 67 y/o F with PMHx of cirhhosis, liver failure, hepatic encephalopathy, chronic hepatitis B and delta and TIIDM, ascites, hypotension and hyponatremia who presents today after a paracentesis and lab work revealing a sodium of 119. Pt is a liver transplant candidate and is on the transplant list. Pt has no complaints of pain at this time. Pt endorses fatigue and some nausea this morning after taking her morning meds. Pt had 5L removed during her paracentesis. She has not had a BM yet today, despite taking her lactulose. Pt states she is compliant with her fluid restriction. Pt is staying at the [facility].  ED Management:  Liver team at pt's bedside at conclusion  of my exam. They are taking over patient care and entering orders and admitting the patient.              Attending Attestation:   Physician Attestation Statement for Resident:  As the supervising MD   Physician Attestation Statement: I have personally seen and examined this patient.   I agree with the above history. -: 67 y/o hx liver fslilure, on the tranpalnt list eresiding on [facility]. had blood work today showing soidium 119. She was sent to ED for eval.  She has no compliants at this time.     As the supervising MD I agree with the above PE.   -: via , awake, alert, no distress, lungs clear abdomen softly distended and non tender,   As the supervising MD I agree with the above treatment, course, plan, and disposition.   -: Admit to transplant team                         Clinical Impression:   The primary encounter diagnosis was Hyponatremia. Diagnoses of Other ascites, 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, Hepatic encephalopathy, Liver transplant candidate, Type 2 diabetes mellitus with diabetic polyneuropathy, with long-term current use of insulin, and Chronic liver failure without hepatic coma were also pertinent to this visit.      Disposition:   Disposition: Admitted  To liver transplant service.                        Deborah Cardenas MD  Resident  10/02/18 8334

## 2018-10-01 NOTE — TELEPHONE ENCOUNTER
Meld exception submitted to [facility] today:  Approved by Dr. Sprague.    From: Jerel Worrell   Sent: Monday, October 01, 2018 1:59 PM  To: Guillermina Vidales <[email or web address]>; Gigi Sprague M.D. <[email or web address]>  Subject: MELD Exception request - please review and submit  Importance: High Scarlett Reddy  MRN: [id number]     Patient is a 68-year-old female with chronic hepatitis B infection with delta virus, decompensated cirrhosis with refractory ascites, grade1-3 hepatic encephalopathy, muscle wasting and profound hyponatremia. Her natural MELD-Na score ranges from 11-26 depending on her sodium level. She has been hospitalized 5 times since August 2018 with profound hyponatremia.  Her serum sodium was as low as 119 mmol/L. She requires large volume paracentesis weekly. Recurrent large ascites has severely decreased her quality of life.  She is not a candidate for diuretics or TIPS due to hepatic encephalopathy and profound hyponatremia. Her natural MELD score does not represent the true severity of her liver disease. Hyponatremia is an independent risk factor of mortality in cirrhotic patients. Liver transplant is the only definitive treatment for these life-threatening complications namely refractory ascites and profound hyponatremia. We respectfully request a MELD exception score of 27 points, which will increase her chance of receiving a liver transplant before additional complications ensue resulting in death on the wait list.

## 2018-10-01 NOTE — ED NOTES
LOC: The patient is awake, alert, and aware of environment. The patient is oriented x 3 and speaking appropriately.   APPEARANCE: No acute distress noted.   PSYCHOSOCIAL: Patient is calm and cooperative.   SKIN: The skin is warm, dry.   RESPIRATORY: Airway is open and patent. Bilateral chest rise and fall. Respirations are spontaneous, even and unlabored. Normal effort and rate noted. No accessory muscle use noted.   CARDIAC: Patient has a normal rate and rhythm. Denies chest pain or SOB.   ABDOMEN: Soft and non tender to palpation. No distention noted.   URINARY:  Voids independently.   NEUROLOGIC: Eyes open spontaneously. Speech clear. Tolerating saliva secretions well. Able to follow commands, demonstrating ability to actively and appropriately communicate within context of current conversation. Symmetrical facial muscles. Moving all extremities well. Movement is purposeful.   MUSCULOSKELETAL: No obvious deformities noted.

## 2018-10-01 NOTE — ASSESSMENT & PLAN NOTE
- listed for liver transplant, on hold due to hyponatremia  MELD-Na score: 25 at 10/1/2018 12:25 PM  MELD score: 15 at 10/1/2018 12:25 PM  Calculated from:  Serum Creatinine: 1.5 mg/dL at 10/1/2018 12:25 PM  Serum Sodium: 119 mmol/L (Rounded to 125 mmol/L) at 10/1/2018 12:25 PM  Total Bilirubin: 1.9 mg/dL at 10/1/2018 12:25 PM  INR(ratio): 1.2 at 10/1/2018 12:25 PM  Age: 68 years

## 2018-10-01 NOTE — H&P
Radiology History & Physical      SUBJECTIVE:     Chief Complaint: Cirrhosis with ascites    History of Present Illness:  Scarlett Reddy is a 68 y.o. female who presents for US guided paracentesis.    Past Medical History:   Diagnosis Date    Anemia requiring transfusions 8/16/2018    Ascites     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 8/15/2018    Cirrhosis     Cough 8/30/2018    Esophageal varices     Esophageal varices without bleeding 8/15/2018    Hepatic encephalopathy     Hepatitis B     Hepatitis D virus infection     Hypersplenism     Hyponatremia     Hypotension     Liver transplant candidate     Neutropenia     Portal hypertension     Severe malnutrition 8/16/2018    Type 2 diabetes mellitus, with long-term current use of insulin 8/15/2018     Past Surgical History:   Procedure Laterality Date    EGD (ESOPHAGOGASTRODUODENOSCOPY) N/A 8/17/2018    Performed by Travon Delgadillo MD at [facility] ([address] [place] [address])    ESOPHAGOGASTRODUODENOSCOPY N/A 8/17/2018    Procedure: EGD (ESOPHAGOGASTRODUODENOSCOPY);  Surgeon: Travon Delgadillo MD;  Location: [facility] ([address] [place] [address]);  Service: Endoscopy;  Laterality: N/A;       Home Meds:   Prior to Admission medications    Medication Sig Start Date End Date Taking? Authorizing Provider   blood sugar diagnostic (ACCU-CHEK DC) Strp 1 strip by Misc.(Non-Drug; Combo Route) route 3 (three) times daily.    Historical Provider, MD   entecavir (BARACLUDE) 0.5 MG Tab Take 1 tablet (0.5 mg total) by mouth once daily. 9/4/18   Rita Rubin NP   ergocalciferol, vitamin D2, (VITAMIN D ORAL) Take by mouth.    Historical Provider, MD   insulin aspart U-100 (NOVOLOG FLEXPEN U-100 INSULIN) 100 unit/mL InPn pen Inject 20 units into the skin with breakfast,17 units with lunch, and 17 with dinner Plus correction scale, max TDD 60 units daily 9/27/18   Seble Tineo NP   insulin glargine (LANTUS) 100 unit/mL injection Inject 20 Units into the skin every evening. 9/27/18   " Seble Tineo, KEEGAN   lactulose (CHRONULAC) 10 gram/15 mL solution Take 30 mLs (20 g total) by mouth 3 (three) times daily. Adjust dose to have 4 bowel movements daily 9/12/18   Yony Burns MD   midodrine (PROAMATINE) 5 MG Tab Take 3 tablets (15 mg total) by mouth every 8 (eight) hours. 8/24/18 2/20/19  Gigi Sprague MD   OMEGA-3 FATTY ACIDS-EPA ORAL Take by mouth.    Historical Provider, MD   pen needle, diabetic (BD ULTRA-FINE CITLALI PEN NEEDLE) 32 gauge x 5/32" Ndle To use to inject insulin 4x daily 9/14/18   Rita Rubin NP   rifAXIMin (XIFAXAN) 550 mg Tab Take 1 tablet (550 mg total) by mouth 2 (two) times daily. 8/27/18   Rita Rubin NP   sodium bicarbonate 650 MG tablet Take 2 tablets (1,300 mg total) by mouth 3 (three) times daily. 9/21/18   Gigi Sprague MD     Anticoagulants/Antiplatelets: no anticoagulation    Allergies: Review of patient's allergies indicates:  No Known Allergies  Sedation History:  no adverse reactions    Review of Systems:   Hematological: no known coagulopathies  Respiratory: no shortness of breath  Cardiovascular: no chest pain  Gastrointestinal: no abdominal pain  Genito-Urinary: no dysuria  Musculoskeletal: negative  Neurological: no TIA or stroke symptoms         OBJECTIVE:     Vital Signs (Most Recent)  Pulse: 88 (10/01/18 1053)  Resp: 16 (10/01/18 1053)  BP: (!) 105/54 (10/01/18 1053)  SpO2: 98 % (10/01/18 1053)    Physical Exam:  ASA: 2  Mallampati: 2    General: no acute distress  Mental Status: alert and oriented to person, place and time  HEENT: normocephalic, atraumatic  Chest: unlabored breathing  Heart: regular heart rate  Abdomen: distended  Extremity: moves all extremities    Laboratory  Lab Results   Component Value Date    INR 1.1 09/27/2018       Lab Results   Component Value Date    WBC 2.77 (L) 09/27/2018    HGB 7.9 (L) 09/27/2018    HCT 23.0 (L) 09/27/2018    MCV 94 09/27/2018    PLT 64 (L) 09/27/2018      Lab Results   Component Value Date    GLU 89 " 09/27/2018     (L) 09/27/2018    K 3.0 (L) 09/27/2018    CL 94 (L) 09/27/2018    CO2 25 09/27/2018    BUN 83 (H) 09/27/2018    CREATININE 1.1 09/27/2018    CALCIUM 9.3 09/27/2018    MG 2.7 (H) 09/27/2018    ALT 62 (H) 09/27/2018    AST 87 (H) 09/27/2018    ALBUMIN 3.9 09/27/2018    BILITOT 1.9 (H) 09/27/2018    BILIDIR 0.6 (H) 09/20/2018       ASSESSMENT/PLAN:     Sedation Plan: Local    Patient will undergo US guided paracentesis.      James Rondon MD  Radiology, PGY - III  [phone]

## 2018-10-01 NOTE — TELEPHONE ENCOUNTER
Call received from Jovita, Chemistry lab, with report of a critical sodium level..119.  With assistance if , patient notified of lab result and instructed to report to the ED.     MD and nurse coordinator notified.

## 2018-10-01 NOTE — PROCEDURES
Radiology Post-Procedure Note    Pre Op Diagnosis: Ascites  Post Op Diagnosis: Same    Procedure: Paracentesis    Procedure performed by: Dr. John Torrez    Written Informed Consent Obtained: Yes  Specimen Removed: YES (5 L ascites)  Estimated Blood Loss: Minimal    Findings:   Successful paracentesis.  Albumin administered PRN per protocol.    Patient tolerated procedure well.    James Rondon MD  Radiology, PGY - III  [phone]

## 2018-10-02 LAB
ALBUMIN SERPL BCP-MCNC: 4.1 G/DL
ALP SERPL-CCNC: 275 U/L
ALT SERPL W/O P-5'-P-CCNC: 70 U/L
ANION GAP SERPL CALC-SCNC: 14 MMOL/L
AST SERPL-CCNC: 115 U/L
BASOPHILS # BLD AUTO: 0.01 K/UL
BASOPHILS NFR BLD: 0.4 %
BILIRUB SERPL-MCNC: 2 MG/DL
BUN SERPL-MCNC: 93 MG/DL
CALCIUM SERPL-MCNC: 9 MG/DL
CHLORIDE SERPL-SCNC: 83 MMOL/L
CO2 SERPL-SCNC: 25 MMOL/L
CREAT SERPL-MCNC: 1 MG/DL
DIFFERENTIAL METHOD: ABNORMAL
EOSINOPHIL # BLD AUTO: 0 K/UL
EOSINOPHIL NFR BLD: 1.5 %
ERYTHROCYTE [DISTWIDTH] IN BLOOD BY AUTOMATED COUNT: 14.9 %
EST. GFR  (AFRICAN AMERICAN): >60 ML/MIN/1.73 M^2
EST. GFR  (NON AFRICAN AMERICAN): 57.6 ML/MIN/1.73 M^2
GLUCOSE SERPL-MCNC: 131 MG/DL
HCT VFR BLD AUTO: 22.5 %
HGB BLD-MCNC: 8 G/DL
IMM GRANULOCYTES # BLD AUTO: 0.01 K/UL
IMM GRANULOCYTES NFR BLD AUTO: 0.4 %
INR PPP: 1.2
LYMPHOCYTES # BLD AUTO: 0.3 K/UL
LYMPHOCYTES NFR BLD: 11.3 %
MAGNESIUM SERPL-MCNC: 2.6 MG/DL
MCH RBC QN AUTO: 31.9 PG
MCHC RBC AUTO-ENTMCNC: 35.6 G/DL
MCV RBC AUTO: 90 FL
MONOCYTES # BLD AUTO: 0.4 K/UL
MONOCYTES NFR BLD: 13.6 %
NEUTROPHILS # BLD AUTO: 1.9 K/UL
NEUTROPHILS NFR BLD: 72.8 %
NRBC BLD-RTO: 0 /100 WBC
PHOSPHATE SERPL-MCNC: 2.9 MG/DL
PLATELET # BLD AUTO: 82 K/UL
PMV BLD AUTO: 12 FL
POCT GLUCOSE: 123 MG/DL (ref 70–110)
POCT GLUCOSE: 124 MG/DL (ref 70–110)
POCT GLUCOSE: 196 MG/DL (ref 70–110)
POCT GLUCOSE: 215 MG/DL (ref 70–110)
POTASSIUM SERPL-SCNC: 3.2 MMOL/L
PROT SERPL-MCNC: 5.8 G/DL
PROTHROMBIN TIME: 12.2 SEC
RBC # BLD AUTO: 2.51 M/UL
SODIUM SERPL-SCNC: 121 MMOL/L
SODIUM SERPL-SCNC: 122 MMOL/L
WBC # BLD AUTO: 2.65 K/UL

## 2018-10-02 PROCEDURE — 25000003 PHARM REV CODE 250: Mod: NTX | Performed by: NURSE PRACTITIONER

## 2018-10-02 PROCEDURE — 85025 COMPLETE CBC W/AUTO DIFF WBC: CPT | Mod: NTX

## 2018-10-02 PROCEDURE — 80053 COMPREHEN METABOLIC PANEL: CPT | Mod: NTX

## 2018-10-02 PROCEDURE — 84100 ASSAY OF PHOSPHORUS: CPT | Mod: NTX

## 2018-10-02 PROCEDURE — 36415 COLL VENOUS BLD VENIPUNCTURE: CPT | Mod: NTX

## 2018-10-02 PROCEDURE — 63600175 PHARM REV CODE 636 W HCPCS: Mod: NTX | Performed by: NURSE PRACTITIONER

## 2018-10-02 PROCEDURE — 84295 ASSAY OF SERUM SODIUM: CPT | Mod: NTX

## 2018-10-02 PROCEDURE — 99223 1ST HOSP IP/OBS HIGH 75: CPT | Mod: NTX,,, | Performed by: NURSE PRACTITIONER

## 2018-10-02 PROCEDURE — 85610 PROTHROMBIN TIME: CPT | Mod: NTX

## 2018-10-02 PROCEDURE — 20600001 HC STEP DOWN PRIVATE ROOM: Mod: NTX

## 2018-10-02 PROCEDURE — 99233 SBSQ HOSP IP/OBS HIGH 50: CPT | Mod: NTX,,, | Performed by: NURSE PRACTITIONER

## 2018-10-02 PROCEDURE — 83735 ASSAY OF MAGNESIUM: CPT | Mod: NTX

## 2018-10-02 RX ORDER — IBUPROFEN 200 MG
16 TABLET ORAL
Status: DISCONTINUED | OUTPATIENT
Start: 2018-10-02 | End: 2018-10-05

## 2018-10-02 RX ORDER — DOCUSATE SODIUM 100 MG/1
100 CAPSULE, LIQUID FILLED ORAL 3 TIMES DAILY
Status: DISCONTINUED | OUTPATIENT
Start: 2018-10-02 | End: 2018-10-02

## 2018-10-02 RX ORDER — IBUPROFEN 200 MG
24 TABLET ORAL
Status: DISCONTINUED | OUTPATIENT
Start: 2018-10-02 | End: 2018-10-05

## 2018-10-02 RX ORDER — INSULIN ASPART 100 [IU]/ML
0-5 INJECTION, SOLUTION INTRAVENOUS; SUBCUTANEOUS
Status: DISCONTINUED | OUTPATIENT
Start: 2018-10-02 | End: 2018-10-05

## 2018-10-02 RX ORDER — GLUCAGON 1 MG
1 KIT INJECTION
Status: DISCONTINUED | OUTPATIENT
Start: 2018-10-02 | End: 2018-10-05

## 2018-10-02 RX ORDER — LACTULOSE 10 G/15ML
30 SOLUTION ORAL 3 TIMES DAILY
Status: DISCONTINUED | OUTPATIENT
Start: 2018-10-02 | End: 2018-10-05

## 2018-10-02 RX ORDER — POTASSIUM CHLORIDE 20 MEQ/1
40 TABLET, EXTENDED RELEASE ORAL EVERY 4 HOURS
Status: DISCONTINUED | OUTPATIENT
Start: 2018-10-02 | End: 2018-10-02

## 2018-10-02 RX ORDER — DOCUSATE SODIUM 100 MG/1
100 CAPSULE, LIQUID FILLED ORAL 3 TIMES DAILY
Status: DISCONTINUED | OUTPATIENT
Start: 2018-10-02 | End: 2018-10-05

## 2018-10-02 RX ORDER — INSULIN ASPART 100 [IU]/ML
2 INJECTION, SOLUTION INTRAVENOUS; SUBCUTANEOUS
Status: DISCONTINUED | OUTPATIENT
Start: 2018-10-02 | End: 2018-10-05

## 2018-10-02 RX ADMIN — POTASSIUM BICARBONATE 50 MEQ: 25 TABLET, EFFERVESCENT ORAL at 04:10

## 2018-10-02 RX ADMIN — INSULIN DETEMIR 20 UNITS: 100 INJECTION, SOLUTION SUBCUTANEOUS at 09:10

## 2018-10-02 RX ADMIN — ERGOCALCIFEROL 50000 UNITS: 1.25 CAPSULE ORAL at 09:10

## 2018-10-02 RX ADMIN — INSULIN ASPART 17 UNITS: 100 INJECTION, SOLUTION INTRAVENOUS; SUBCUTANEOUS at 09:10

## 2018-10-02 RX ADMIN — HEPARIN SODIUM 5000 UNITS: 5000 INJECTION, SOLUTION INTRAVENOUS; SUBCUTANEOUS at 05:10

## 2018-10-02 RX ADMIN — DOCUSATE SODIUM 100 MG: 100 CAPSULE, LIQUID FILLED ORAL at 06:10

## 2018-10-02 RX ADMIN — SODIUM BICARBONATE 650 MG TABLET 1300 MG: at 09:10

## 2018-10-02 RX ADMIN — HEPARIN SODIUM 5000 UNITS: 5000 INJECTION, SOLUTION INTRAVENOUS; SUBCUTANEOUS at 09:10

## 2018-10-02 RX ADMIN — SODIUM BICARBONATE 650 MG TABLET 1300 MG: at 04:10

## 2018-10-02 RX ADMIN — LACTULOSE 30 G: 20 SOLUTION ORAL at 09:10

## 2018-10-02 RX ADMIN — LACTULOSE 30 G: 20 SOLUTION ORAL at 04:10

## 2018-10-02 RX ADMIN — MIDODRINE HYDROCHLORIDE 15 MG: 5 TABLET ORAL at 09:10

## 2018-10-02 RX ADMIN — INSULIN ASPART 17 UNITS: 100 INJECTION, SOLUTION INTRAVENOUS; SUBCUTANEOUS at 06:10

## 2018-10-02 RX ADMIN — INSULIN ASPART 2 UNITS: 100 INJECTION, SOLUTION INTRAVENOUS; SUBCUTANEOUS at 06:10

## 2018-10-02 RX ADMIN — RIFAXIMIN 550 MG: 550 TABLET ORAL at 09:10

## 2018-10-02 RX ADMIN — MIDODRINE HYDROCHLORIDE 15 MG: 5 TABLET ORAL at 05:10

## 2018-10-02 RX ADMIN — DOCUSATE SODIUM 100 MG: 100 CAPSULE, LIQUID FILLED ORAL at 01:10

## 2018-10-02 RX ADMIN — INSULIN ASPART 17 UNITS: 100 INJECTION, SOLUTION INTRAVENOUS; SUBCUTANEOUS at 02:10

## 2018-10-02 RX ADMIN — LACTULOSE 30 G: 20 SOLUTION ORAL at 10:10

## 2018-10-02 RX ADMIN — MIDODRINE HYDROCHLORIDE 15 MG: 5 TABLET ORAL at 01:10

## 2018-10-02 RX ADMIN — DOCUSATE SODIUM 100 MG: 100 CAPSULE, LIQUID FILLED ORAL at 09:10

## 2018-10-02 RX ADMIN — HEPARIN SODIUM 5000 UNITS: 5000 INJECTION, SOLUTION INTRAVENOUS; SUBCUTANEOUS at 01:10

## 2018-10-02 RX ADMIN — ENTECAVIR 0.5 MG: 0.5 TABLET ORAL at 09:10

## 2018-10-02 NOTE — ED NOTES
Spoke with Dc from transplant team about PT's BP. Provider is aware and ordered for PT to receive 1L albumin over 3 hours.

## 2018-10-02 NOTE — HPI
Reason for Consult: Management of type 2 DM, Hyperglycemia      Surgical Procedure and Date: Liver transplant 10/5/18, Ex lap 10/6/18     Diabetes diagnosis year: 2013     Home Diabetes Medications:  Lantus 18 units HS and novolog 17 units with meals plus correction scale (150-200 +1)        Lab Results   Component Value Date     HGBA1C 6.8 (H) 08/16/2018        How often checking glucose at home? 3-4   BG readings on regimen: 120-150.  Post prandial readings as high as upper 200s  Hypoglycemia on the regimen? yes, none recently   Missed doses on regimen?  No     Diabetes Complications include:     Diabetic peripheral neuropathy      Complicating diabetes co morbidities:   CIRRHOSIS        HPI:  Patient is a 68 y.o. female with a diagnosis of ESLD, listed for liver transplant with meld 23 who underwent live transplant on 10/5/18.  Back to OR on 10/6/18 for ex lap.  Admitted 10/1/18 for hyponatremia s/p paracentesis.  Personal has known diagnosis of diabetes, endocrine consulted for diabetes management.    Post-operative course complicated by ESBL Klebsiella pneumoniae in urine (in contact isolation), peritransplant ascites, worsening renal function (required temporary dialysis), anemia (multiple blood transfusions), and possible GI bleed.

## 2018-10-02 NOTE — ASSESSMENT & PLAN NOTE
BG goal 140-180    Levemir 20 units q HS  Novolog 17 units with meals  Low dose correction scale insulin  PRN Novolog 2 units for boost/snack  BG monitoring AC/HS    Discharge planning:  Likely resume home medication regimen.  Assess for changes in insulin requirement close to d/c date

## 2018-10-02 NOTE — ASSESSMENT & PLAN NOTE
- Na 119 on admit.  - Na 125.  - continue 1 L fluid restriction and low sodium diet.  - take off hold for oltx

## 2018-10-02 NOTE — CONSULTS
[facility]  Endocrinology  Diabetes Consult Note    Consult Requested by: Benson Matson MD   Reason for admit: Hyponatremia    HISTORY OF PRESENT ILLNESS:  Reason for Consult: Management of type 2 DM, Hyperglycemia      Surgical Procedure and Date: n/a      Diabetes diagnosis year:      Home Diabetes Medications:  Lantus 18 units HS and novolog 17 units with meals plus correction scale (150-200 +1)        Lab Results   Component Value Date     HGBA1C 6.8 (H) 2018        How often checking glucose at home? 3-4   BG readings on regimen: 180-310  Hypoglycemia on the regimen? yes, none recently   Missed doses on regimen?  No     Diabetes Complications include:     Diabetic peripheral neuropathy      Complicating diabetes co morbidities:   CIRRHOSIS        HPI:  Patient is a 68 y.o. female with a diagnosis of ESLD, listed for liver transplant with meld 23.  Admitted 10/1/18 for hyponatremia s/p paracentesis.    She speaks primarily Azeri. Information obtained from recent past admission and discussion over the phone via  ([employer]). Family not at bedside. Pt request family at bedside for future conversations.   Personal has known diagnosis of diabetes, endocrine consulted for diabetes management.        Interval HPI:   Overnight events: Admitted to TSU overnight, BG elevated yesterday evening but well controlled overnight.  Eatin%  Nausea: No  Hypoglycemia and intervention: No  Fever: No  TPN and/or TF: No    PMH, PSH, FH, SH reviewed     ROS:    Constitutional: Weight fluctuates frequently due to fluid volume status, reports < 5 lb weight gain.  Positive for generalized weakness.  Eyes: Negative for visual disturbance.  Respiratory: Negative for cough.   Cardiovascular: Negative for chest pain.  Gastrointestinal: Negative for nausea.  C/o constipation  Endocrine: Negative for polyuria. Positive for polydipsia.  Musculoskeletal: Negative for back pain.  Reports neck  stiffness and leg cramping.  Skin: Negative for rash.  Neurological: Negative for syncope.  Reports peripheral neuropathy in bilateral upper extremities  Psychiatric/Behavioral: Negative for depression.      Current Medications and/or Treatments Impacting Glycemic Control  Immunotherapy:    Immunosuppressants     None        Steroids:   Hormones (From admission, onward)    None        Pressors:    Autonomic Drugs (From admission, onward)    Start     Stop Route Frequency Ordered    10/01/18 2200  midodrine tablet 15 mg      -- Oral Every 8 hours 10/01/18 1617        Hyperglycemia/Diabetes Medications:   Antihyperglycemics (From admission, onward)    Start     Stop Route Frequency Ordered    10/02/18 1645  insulin aspart U-100 pen 2 Units      -- SubQ As needed (PRN) 10/02/18 1546    10/02/18 1015  insulin aspart U-100 pen 0-5 Units      -- SubQ Before meals & nightly PRN 10/02/18 0915    10/01/18 2100  insulin detemir U-100 pen 20 Units      -- SubQ Nightly 10/01/18 1617    10/01/18 1715  insulin aspart U-100 pen 17 Units      -- SubQ 3 times daily with meals 10/01/18 1617             PHYSICAL EXAMINATION:  Vitals:    10/02/18 1200   BP: 100/63   Pulse: 82   Resp: 13   Temp:      Body mass index is 21.48 kg/m².    Physical Exam     Constitutional:  Well developed, well nourished, NAD.  ENT: External ears no masses with nose patent; normal hearing.   Neck:  Supple; trachea midline  Cardiovascular: Normal heart sounds, no LE edema.     Lungs:  Normal effort; lungs anterior bilaterally clear to auscultation.  Abdomen:  Soft, no masses, no hernias.  Hypoactive BS  MS: No clubbing or cyanosis of nails noted; unable to assess gait.  Skin: No rashes, lesions, or ulcers; no nodules.  Psychiatric: Good judgement and insight; normal mood and affect.  Neurological: Cranial nerves are grossly intact      Labs Reviewed and Include   Recent Labs   Lab  10/02/18   0535  10/02/18   1519   GLU  131*   --    CALCIUM  9.0   --     ALBUMIN  4.1   --    PROT  5.8*   --    NA  122*  121*   K  3.2*   --    CO2  25   --    CL  83*   --    BUN  93*   --    CREATININE  1.0   --    ALKPHOS  275*   --    ALT  70*   --    AST  115*   --    BILITOT  2.0*   --      Lab Results   Component Value Date    WBC 2.65 (L) 10/02/2018    HGB 8.0 (L) 10/02/2018    HCT 22.5 (L) 10/02/2018    MCV 90 10/02/2018    PLT 82 (L) 10/02/2018     No results for input(s): TSH, FREET4 in the last 168 hours.  Lab Results   Component Value Date    HGBA1C 6.8 (H) 08/16/2018       Nutritional status:   Body mass index is 21.48 kg/m².  Lab Results   Component Value Date    ALBUMIN 4.1 10/02/2018    ALBUMIN 4.1 10/01/2018    ALBUMIN 3.9 09/27/2018     Lab Results   Component Value Date    PREALBUMIN 12 (L) 09/17/2018       Estimated Creatinine Clearance: 43.9 mL/min (based on SCr of 1 mg/dL).    Accu-Checks  Recent Labs      10/01/18   1713  10/01/18   1758  10/01/18   1849  10/01/18   2114  10/02/18   0825   POCTGLUCOSE  224*  218*  212*  129*  124*        ASSESSMENT and PLAN    * Hyponatremia    Na 119 on admit 10/1  Managed per primary team          Type 2 diabetes mellitus with diabetic polyneuropathy, with long-term current use of insulin    BG goal 140-180    Levemir 20 units q HS  Novolog 17 units with meals  Low dose correction scale insulin  PRN Novolog 2 units for boost/snack  BG monitoring AC/HS    Discharge planning:  Likely resume home medication regimen.  Assess for changes in insulin requirement close to d/c date        Liver transplant candidate    Liver listed, currently on hold for hyponatremia per primary team  Avoid hyoglycemia    Recent Labs   Lab  10/02/18   0535   ALT  70*   AST  115*   ALKPHOS  275*   BILITOT  2.0*   PROT  5.8*   ALBUMIN  4.1               Severe malnutrition    Oral intake encouraged, may affect BG readings        Ascites    Paracentesis prn per primary team.  Avoid hypoglycemia        Chronic liver failure without hepatic coma    Liver  listed, currently on hold for hyponatremia per primary team  Avoid hyoglycemia            Plan discussed with patient at bedside via .     Be Willams NP  Endocrinology  [facility]

## 2018-10-02 NOTE — ASSESSMENT & PLAN NOTE
- listed for liver transplant, was on hold due to hyponatremia  - sodium improved, off hold 10/4    MELD-Na score: 24 at 10/3/2018  6:38 AM  MELD score: 13 at 10/3/2018  6:38 AM  Calculated from:  Serum Creatinine: 1.3 mg/dL at 10/3/2018  6:38 AM  Serum Sodium: 122 mmol/L (Rounded to 125 mmol/L) at 10/3/2018  6:38 AM  Total Bilirubin: 2.2 mg/dL at 10/3/2018  6:38 AM  INR(ratio): 1.1 at 10/3/2018  6:38 AM  Age: 68 years

## 2018-10-02 NOTE — SUBJECTIVE & OBJECTIVE
Interval HPI:   Overnight events: Admitted to [facility] overnight, BG elevated yesterday evening but well controlled overnight.  Eatin%  Nausea: No  Hypoglycemia and intervention: No  Fever: No  TPN and/or TF: No    PMH, PSH, FH, SH reviewed     ROS:    Constitutional: Weight fluctuates frequently due to fluid volume status, reports < 5 lb weight gain.  Positive for generalized weakness.  Eyes: Negative for visual disturbance.  Respiratory: Negative for cough.   Cardiovascular: Negative for chest pain.  Gastrointestinal: Negative for nausea.  C/o constipation  Endocrine: Negative for polyuria. Positive for polydipsia.  Musculoskeletal: Negative for back pain.  Reports neck stiffness and leg cramping.  Skin: Negative for rash.  Neurological: Negative for syncope.  Reports peripheral neuropathy in bilateral upper extremities  Psychiatric/Behavioral: Negative for depression.      Current Medications and/or Treatments Impacting Glycemic Control  Immunotherapy:    Immunosuppressants     None        Steroids:   Hormones (From admission, onward)    None        Pressors:    Autonomic Drugs (From admission, onward)    Start     Stop Route Frequency Ordered    10/01/18 2200  midodrine tablet 15 mg      -- Oral Every 8 hours 10/01/18 1617        Hyperglycemia/Diabetes Medications:   Antihyperglycemics (From admission, onward)    Start     Stop Route Frequency Ordered    10/02/18 1645  insulin aspart U-100 pen 2 Units      -- SubQ As needed (PRN) 10/02/18 1546    10/02/18 1015  insulin aspart U-100 pen 0-5 Units      -- SubQ Before meals & nightly PRN 10/02/18 0915    10/01/18 2100  insulin detemir U-100 pen 20 Units      -- SubQ Nightly 10/01/18 1617    10/01/18 1715  insulin aspart U-100 pen 17 Units      -- SubQ 3 times daily with meals 10/01/18 1617             PHYSICAL EXAMINATION:  Vitals:    10/02/18 1200   BP: 100/63   Pulse: 82   Resp: 13   Temp:      Body mass index is 21.48 kg/m².    Physical Exam      Constitutional:  Well developed, well nourished, NAD.  ENT: External ears no masses with nose patent; normal hearing.   Neck:  Supple; trachea midline  Cardiovascular: Normal heart sounds, no LE edema.     Lungs:  Normal effort; lungs anterior bilaterally clear to auscultation.  Abdomen:  Soft, no masses, no hernias.  Hypoactive BS  MS: No clubbing or cyanosis of nails noted; unable to assess gait.  Skin: No rashes, lesions, or ulcers; no nodules.  Psychiatric: Good judgement and insight; normal mood and affect.  Neurological: Cranial nerves are grossly intact

## 2018-10-02 NOTE — SUBJECTIVE & OBJECTIVE
Scheduled Meds:   docusate sodium  100 mg Oral TID    entecavir  0.5 mg Oral Daily    ergocalciferol  50,000 Units Oral Q7 Days    heparin (porcine)  5,000 Units Subcutaneous Q8H    insulin aspart U-100  17 Units Subcutaneous TIDWM    insulin detemir U-100  20 Units Subcutaneous QHS    lactulose  30 g Oral TID    lidocaine (PF) 10 mg/ml (1%)  1 mL Other Once    midodrine  15 mg Oral Q8H    potassium bicarbonate  50 mEq Oral Once    rifAXIMin  550 mg Oral BID    sodium bicarbonate  1,300 mg Oral TID     Continuous Infusions:  PRN Meds:dextrose 50%, dextrose 50%, glucagon (human recombinant), glucose, glucose, insulin aspart U-100, ondansetron, promethazine (PHENERGAN) IVPB, sodium chloride 0.9%    Review of Systems   Constitutional: Positive for activity change, appetite change and fatigue. Negative for chills, diaphoresis and fever.   HENT: Negative for congestion and facial swelling.    Eyes: Negative for pain, discharge and visual disturbance.   Respiratory: Negative for cough, chest tightness, shortness of breath and wheezing.    Cardiovascular: Positive for leg swelling. Negative for chest pain and palpitations.   Gastrointestinal: Positive for abdominal distention. Negative for abdominal pain, constipation, diarrhea, nausea and vomiting.   Endocrine: Negative.    Genitourinary: Negative for decreased urine volume and dysuria.   Musculoskeletal: Negative for arthralgias and back pain.   Skin: Negative for wound.   Allergic/Immunologic: Negative for immunocompromised state.   Neurological: Positive for weakness. Negative for tremors.   Hematological: Negative.    Psychiatric/Behavioral: Negative for confusion. The patient is not nervous/anxious.      Objective:     Vital Signs (Most Recent):  Temp: 98 °F (36.7 °C) (10/02/18 1155)  Pulse: 82 (10/02/18 1200)  Resp: 13 (10/02/18 1200)  BP: 100/63 (10/02/18 1200)  SpO2: 96 % (10/02/18 1200) Vital Signs (24h Range):  Temp:  [98 °F (36.7 °C)-98.1 °F (36.7  °C)] 98 °F (36.7 °C)  Pulse:  [82-93] 82  Resp:  [11-25] 13  SpO2:  [96 %-100 %] 96 %  BP: ()/(45-63) 100/63     Weight: 55 kg (121 lb 4.1 oz)  Body mass index is 21.48 kg/m².    Intake/Output - Last 3 Shifts       09/30 0700 - 10/01 0659 10/01 0700 - 10/02 0659 10/02 0700 - 10/03 0659    P.O.  0 250    Total Intake(mL/kg)  0 (0) 250 (4.5)    Net  0 +250           Urine Occurrence  1 x     Stool Occurrence  0 x           Physical Exam   Constitutional: She is oriented to person, place, and time. She appears well-developed. No distress.   Temporal and distal extremity muscle wasting   HENT:   Head: Normocephalic and atraumatic.   Eyes: Pupils are equal, round, and reactive to light. No scleral icterus.   Neck: Normal range of motion. Neck supple.   Cardiovascular: Normal rate, regular rhythm and normal heart sounds.   Pulmonary/Chest: Effort normal and breath sounds normal. She has no rales.   Abdominal: Soft. Bowel sounds are normal. She exhibits distension (ascites). There is no tenderness.   Musculoskeletal: She exhibits edema.   Neurological: She is alert and oriented to person, place, and time.   Skin: Skin is warm and dry. She is not diaphoretic.   Psychiatric: She has a normal mood and affect. Her behavior is normal.   Nursing note and vitals reviewed.      Laboratory:  Immunosuppressants     None        CBC:   Recent Labs   Lab  10/02/18   0535   WBC  2.65*   RBC  2.51*   HGB  8.0*   HCT  22.5*   PLT  82*   MCV  90   MCH  31.9*   MCHC  35.6     CMP:   Recent Labs   Lab  10/02/18   0535   GLU  131*   CALCIUM  9.0   ALBUMIN  4.1   PROT  5.8*   NA  122*   K  3.2*   CO2  25   CL  83*   BUN  93*   CREATININE  1.0   ALKPHOS  275*   ALT  70*   AST  115*   BILITOT  2.0*     Labs within the past 24 hours have been reviewed.    Diagnostic Results:  I have personally reviewed all pertinent imaging studies.

## 2018-10-02 NOTE — NURSING
[place] [id number].  AAOX4.  Pt and daughter are [place] speaking only.  Albumin infusing for low BP's.  Pt had paracentesis Monday morning with 5L taken off.  PIV is intact and free of infection.  Bedside commode ordered.  Bed is in lowest position, call bell is in reach, and pt instructed to call nurse when needing assistance.  Will continue to monitor.

## 2018-10-02 NOTE — PROGRESS NOTES
[facility]  Liver Transplant  Progress Note    Patient Name: Scarlett Reddy  MRN: [id number]  Admission Date: 10/1/2018  Hospital Length of Stay: 0 days  Code Status: Full Code  Primary Care Provider: Primary Doctor No    Subjective:     History of Present Illness:  Ms. Reddy is a 69 y/o female with PMH of ESLD secondary Hep B and D.  Listed for liver transplant with MELD 23.  Paracentesis 10/1 with 5L removed, no fluid sent for analysis.  Morning labs significant for hyponatremia, Na 119.  Pt admitted to LTS for sodium monitoring.    Hospital Course:  Interval history: pt with weakness and fatigue today. Na improved to 122 from 119. Continue 1 L fluid restriction. Albumin 4.1 today. Pt received Albumin 5% 500 ml x 1 dose yesterday. MS stable. Continue lactulose tid. Pt having 1-2 BMs on current dose. May need to increase frequency. Colace ordered as well. Medical MELD 11, listed with 24. Currently on internal hold. Monitor.     Scheduled Meds:   docusate sodium  100 mg Oral TID    entecavir  0.5 mg Oral Daily    ergocalciferol  50,000 Units Oral Q7 Days    heparin (porcine)  5,000 Units Subcutaneous Q8H    insulin aspart U-100  17 Units Subcutaneous TIDWM    insulin detemir U-100  20 Units Subcutaneous QHS    lactulose  30 g Oral TID    lidocaine (PF) 10 mg/ml (1%)  1 mL Other Once    midodrine  15 mg Oral Q8H    potassium bicarbonate  50 mEq Oral Once    rifAXIMin  550 mg Oral BID    sodium bicarbonate  1,300 mg Oral TID     Continuous Infusions:  PRN Meds:dextrose 50%, dextrose 50%, glucagon (human recombinant), glucose, glucose, insulin aspart U-100, ondansetron, promethazine (PHENERGAN) IVPB, sodium chloride 0.9%    Review of Systems   Constitutional: Positive for activity change, appetite change and fatigue. Negative for chills, diaphoresis and fever.   HENT: Negative for congestion and facial swelling.    Eyes: Negative for pain, discharge and visual disturbance.   Respiratory:  Negative for cough, chest tightness, shortness of breath and wheezing.    Cardiovascular: Positive for leg swelling. Negative for chest pain and palpitations.   Gastrointestinal: Positive for abdominal distention. Negative for abdominal pain, constipation, diarrhea, nausea and vomiting.   Endocrine: Negative.    Genitourinary: Negative for decreased urine volume and dysuria.   Musculoskeletal: Negative for arthralgias and back pain.   Skin: Negative for wound.   Allergic/Immunologic: Negative for immunocompromised state.   Neurological: Positive for weakness. Negative for tremors.   Hematological: Negative.    Psychiatric/Behavioral: Negative for confusion. The patient is not nervous/anxious.      Objective:     Vital Signs (Most Recent):  Temp: 98 °F (36.7 °C) (10/02/18 1155)  Pulse: 82 (10/02/18 1200)  Resp: 13 (10/02/18 1200)  BP: 100/63 (10/02/18 1200)  SpO2: 96 % (10/02/18 1200) Vital Signs (24h Range):  Temp:  [98 °F (36.7 °C)-98.1 °F (36.7 °C)] 98 °F (36.7 °C)  Pulse:  [82-93] 82  Resp:  [11-25] 13  SpO2:  [96 %-100 %] 96 %  BP: ()/(45-63) 100/63     Weight: 55 kg (121 lb 4.1 oz)  Body mass index is 21.48 kg/m².    Intake/Output - Last 3 Shifts       09/30 0700 - 10/01 0659 10/01 0700 - 10/02 0659 10/02 0700 - 10/03 0659    P.O.  0 250    Total Intake(mL/kg)  0 (0) 250 (4.5)    Net  0 +250           Urine Occurrence  1 x     Stool Occurrence  0 x           Physical Exam   Constitutional: She is oriented to person, place, and time. She appears well-developed. No distress.   Temporal and distal extremity muscle wasting   HENT:   Head: Normocephalic and atraumatic.   Eyes: Pupils are equal, round, and reactive to light. No scleral icterus.   Neck: Normal range of motion. Neck supple.   Cardiovascular: Normal rate, regular rhythm and normal heart sounds.   Pulmonary/Chest: Effort normal and breath sounds normal. She has no rales.   Abdominal: Soft. Bowel sounds are normal. She exhibits distension (ascites).  There is no tenderness.   Musculoskeletal: She exhibits edema.   Neurological: She is alert and oriented to person, place, and time.   Skin: Skin is warm and dry. She is not diaphoretic.   Psychiatric: She has a normal mood and affect. Her behavior is normal.   Nursing note and vitals reviewed.      Laboratory:  Immunosuppressants     None        CBC:   Recent Labs   Lab  10/02/18   0535   WBC  2.65*   RBC  2.51*   HGB  8.0*   HCT  22.5*   PLT  82*   MCV  90   MCH  31.9*   MCHC  35.6     CMP:   Recent Labs   Lab  10/02/18   0535   GLU  131*   CALCIUM  9.0   ALBUMIN  4.1   PROT  5.8*   NA  122*   K  3.2*   CO2  25   CL  83*   BUN  93*   CREATININE  1.0   ALKPHOS  275*   ALT  70*   AST  115*   BILITOT  2.0*     Labs within the past 24 hours have been reviewed.    Diagnostic Results:  I have personally reviewed all pertinent imaging studies.    Assessment/Plan:     Hyponatremia    - Na 119 on admit, improved to 122 today.  - continue 1 L fluid restriction and low sodium diet.             Liver transplant candidate    - currently on internal hold for hyponatremia.            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  - continue lactulose and rifaximin.            Ascites    - paracentesis 10/1, 5L removed, no fluid sent for analysis.          Severe malnutrition    - dietary consulted.          Hypokalemia    - K-lyte 50 meq x 1.          Type 2 diabetes mellitus with diabetic polyneuropathy, with long-term current use of insulin    - continue home regimen.  - endocrine consulted.               VTE Risk Mitigation (From admission, onward)        Ordered     heparin (porcine) injection 5,000 Units  Every 8 hours      10/01/18 1617     Place OLAF hose  Until discontinued      10/01/18 1617     Place sequential compression device  Until discontinued      10/01/18 1617     IP VTE LOW RISK PATIENT  Once      10/01/18 1617          The patients clinical status was discussed at multidisplinary rounds, involving transplant surgery, transplant medicine, pharmacy, nursing, nutrition, and social work    Discharge Planning: not a candidate for dc at this time.       Kimber Valle, KEEGAN  Liver Transplant  [facility]

## 2018-10-02 NOTE — HOSPITAL COURSE
Hospital Course:   Acute gradual worsening of renal function s/p OLTx, Nephrology consulted for post-op EVA. Creatinine on arrival 0.8 and trended up but pt remained with good UOP. Had stable response to Diuresis. Pt also with persistently elevated BUN, required multiple rounds of dialysis (10/15, 10/17, 10/20, 10/31), although not much improvement noted. Kidney function has since improved, with Cr now returned to baseline.     In addition, pt H/H cont to fluctuate requiring multiple blood transfusions (10/14, 10/16, 10/18, 10/19, 10/22, 10/27, 10/29).  Pt reported dark stools 10/15 but color has normalized. FOBT+. EGD 10/18 consistent with ulcerated mucosa in duodenum s/p coagulation. She was started on Protonix 40mg and completed course of amoxicillin/levofloxacin on 11/2 for concern of H. Pylori. Biopsy negative for infection and CMV. Transfuse as needed with goal Hb > 7.0.  Urine cx + Klebsiella pneumoniae (10/13). Received 3 day course of ciprofloxacin, dc'd 10/19. Patient finished 7 day course of Ertapenem for kleb pneumo ESBL UTI 11/29. Of note, pt grew same bacteria from wound 11/16. Purulent drainage noted from middle of chevron incision (11/16). Wound cx grew klebsiella p. CT scan showed no signs of infection. Area opened with wound vac placed 11/24, removed 12/7.  On 10/19 pm pt c/o crushing chest pain. Troponin 0.094, likely 2/2 ischemic stress. EKG NSR.     Liver US 11/6 showed no arterial flow. Repeat Liver US 11/8 showed arterial flow with increased RIs. 11/15 liver u/s showed abnormally decreased main hepatic artery peak systolic velocity with slow arterial flow and abnormal tardus parvus waveforms intrahepatically. Consulted vascular surgery. Repeat liver US 11/19 showed no arterial flow on R and sluggish flow on L. Hepatic angiogram 11/19 confirmed HAT. Liver US to assess recanalization post HAT 12/4 -> conclusion that pt may recanalize. Will monitor closely with no further intervention planned at  this time.     Pt with orthopnea. CT 11/26 showed large right and moderate left pleural effusions with adjacent basilar atelectasis. Pt sp02 stable on RA, but c/o some orthopnea. Chest xray and echo 12/9 with pleural effusions. Right thoracentesis with chest tube placement 12/11, fluid negative for infection. Chest tube removed, 12/13 w/o any issue. CXR obtained- no pneumo noted. Bilateral pleural effusion still present. Pt with continued worsening abdominal distention & LE edema. Multiple paracentesis (11/7, 11/12, 11/21- negative for infection) for ongoing ascites. 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 Milky appearance, cell count of fluid (51% lymphs), triglycerides 2208, consistent with chyle leak. Attempted low fat, high protein diet with no improvement seen in fluid character. 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 Peritoneal fluid 11/29 w/ Enterococcus. Per ID recs, treated with 10 day course of Vancomycin. Perc drain removed 12/17.     Of note, Valcyte held 10/31 for leukopenia. CMV PCR noted to be positive at 129 on 11/1. Held off on treatment with valcyte, however weekly CMV PCRs increased (12/6 with 529 copies), started treatment dose 12/9/18. Last CMV PCR 12/13 < 35 copies. Cont to monitor w/ weekly CMV PCR.     Interval history: No acute events overnight. LFTs remain stable. Pt's LE edema improving. Decrease lasix from 40 mg BID to 20 mg daily. Paracentesis scheduled for this afternoon. Peritoneal fluid to be sent for lab analysis.Transfused 1u pRBC yesterday with good response. PO intake improving. TPN d/c'd yesterday. Plan for d/c to  tomorrow 12/21 if pt remains stable. Cont to monitor.

## 2018-10-02 NOTE — ASSESSMENT & PLAN NOTE
- paracentesis 10/1, 5L removed, no fluid sent for analysis.  - Paracentesis ordered for Friday 10/5. Limit 4 L max.

## 2018-10-02 NOTE — ASSESSMENT & PLAN NOTE
Liver listed, currently on hold for hyponatremia per primary team  Avoid hyoglycemia    Recent Labs   Lab  10/02/18   0535   ALT  70*   AST  115*   ALKPHOS  275*   BILITOT  2.0*   PROT  5.8*   ALBUMIN  4.1

## 2018-10-02 NOTE — PLAN OF CARE
Problem: Patient Care Overview  Goal: Plan of Care Review  Outcome: Ongoing (interventions implemented as appropriate)  Pt has decreased appetite. Dietitian consulted. Na+=122 this am and 121 this evening. Reinforced 1 liter fluid restriction with pt and family via . Verbalized understanding. Applied non-skid socks. Pt ambulates wearing personal slippers. Glucoses monitored. Insulin given as ordered. Endocrine consulted to follow.

## 2018-10-02 NOTE — PROGRESS NOTES
Admit Note     Met with patient, pt's ,  and [place]  Olga of the International Department to assess patients needs due to the pt's inability to speak/understand English.  Patient is a 68 y.o.  female, admitted hyponatremia. Pt has been currently placed hold due to her sodium.     Patient admitted from direct admissions on 10/1/2018 .  At this time, patient presents as alert and oriented x 4, pleasant, well groomed, calm and cooperative.  At this time, patients caregiver presents as alert and oriented x 4, pleasant, good eye contact, well groomed, recall good, concentration/judgement good, average intelligence, calm, communicative and cooperative.      Household/Family Systems (as reported by patients caregiver)     Patient resides with patient's , at [address].  While awaiting transplant, pt and her family are residing at the [facility], Room 674. Support system includes , Diannaia (primary language is Sammarinese; however, he does understand and speak Albanian), and daughter Susan speaks/understands English; however, prefers Albanian).  Patient does not have dependents that are need of being cared for.     SW attempt to contact the daughter of the patient Susan ph# [phone] to come to the pts room for assessment. However, Susan reported she was unavailable as she was at the [facility] pool about to take a swim and will present once she is finished.  SW explained that she may miss the conversation about the pts health prognosis and in the event she does she should await after noon rounds.      Patients primary caregiver is Andrés Reddy, patients , phone number [phone].  Confirmed patients contact information is [phone] (home);     Telephone Information:   Mobile [phone]   .    During admission, patient's caregiver plans to stay at the [facility].  Confirmed patient and patients caregivers do have access to  reliable transportation.    Cognitive Status/Learning     Patients caregiver reports patients reading ability as college and states patient does have difficulty with weakness and does not speak/understand English.  Patients caregiver reports patient learns best by hands on information with assistance from her daughter.   Needed: Yes; primary language is  [place].  Patient's caregiver informed of  services available and how to access services..   Highest education level: Attended College/Technical School    Vocation/Disability (as reported by patients caregiver)    Working for Income: No  If no, reason not working: Patient Choice - Retired  Patient is retired from being a .    Adherence     Patients caregiver reports patient has a high level of adherence to patients health care regimen.  Adherence counseling and education provided.  Patient's caregiver verbalizes understanding.    Substance Use    Patients caregiver reports patients substance usage as the following:    Tobacco: none, patient denies any use.  Alcohol: none, patient denies any use.  Illicit Drugs/Non-prescribed Medications: none, patient denies any use.  Patients caregiver states clear understanding of the potential impact of substance use.  Substance abstinence/cessation counseling, education and resources provided and reviewed.     Services Utilizing/ADLS (as reported by patients caregiver)    Infusion Service: Prior to admission, patient utilizing? no  Home Health: Prior to admission, patient utilizing? no  DME: Prior to admission, yes; walker and w/c (borrowed from the International [facility].)  Pulmonary/Cardiac Rehab: Prior to admission, no  Dialysis:  Prior to admission, no  Transplant Specialty Pharmacy:  Prior to admission, no.    Prior to admission, patients caregiver reports patient was independent (however, requiring monitoring while showering due to weakness at times) with ADLS and was not driving.   Patients caregiver reports patient is not able to care for self at this time due to compromised medical condition (as documented in medical record) and physical weakness..  Patients caregiver reports patient indicates a willingness to care for self once medically cleared to do so.    Insurance/Medications    Insured by   Payor/Plan Subscr  Sex Relation Sub. Ins. ID Effective Group Num   1. [facility] MERCEDES PATHAK 1950 Female  [id number] 8/3/18                                    [id number] [facility] [id number]      Primary Insurance (for UNOS reporting): Private Insurance-[facility]  Secondary Insurance (for UNOS reporting): None    Patients caregiver reports patient is able to obtain and afford medications at this time and at time of discharge.    Living Will/Healthcare Power of     Patients caregiver reports patient does not have a LW and/or HCPA.   provided education regarding LW and HCPA and the completion of forms.    Coping/Mental Health (as reported by patients caregiver)    Patient is not coping well. Patient reported she is experiencing anxiety and depression with regards to awaiting transplant and being away from home ([place]).  As per previous documentation.  TIMBO Hutchinson has been in communications with pt's [facility] Clinical Manager Diane Willett ([phone]) with regards to availability of counseling services.  TIMBO will follow up regarding pt receiving psyc services.     Discharge Planning (as reported by patients caregiver)    At time of discharge, patient plans to return to [facility] under the care of her  and daughter.  Patients  and daughter will transport patient.  Per rounds today, expected discharge date has not been medically determined at this time. Patients caretaker verbalizes understanding and is involved in treatment planning and discharge process.    Additional Concerns    Patient's caretaker denies additional needs and/or concerns at this  time. Patient is being followed for needs, education, resources, information, emotional support, supportive counseling, and for supportive and skilled discharge plan of care.  providing ongoing psychosocial support, education, resources and d/c planning as needed.  SW remains available.  provided resource list, patient choice, psychosocial and supportive counseling, resources, education, assistance and discharge planning with patient and caregiver involvement, ongoing SW availability and services as appropriate.  remains available. Patient's caregiver verbalizes understanding and agreement with information reviewed,  availability and how to access available resources as needed. Patient denies additional needs and/or concerns at this time. Patient verbalizes understanding and agreement with information reviewed, social work availability, and how to access available resources as needed.

## 2018-10-03 LAB
ALBUMIN SERPL BCP-MCNC: 4 G/DL
ALP SERPL-CCNC: 357 U/L
ALT SERPL W/O P-5'-P-CCNC: 93 U/L
ANION GAP SERPL CALC-SCNC: 12 MMOL/L
AST SERPL-CCNC: 145 U/L
BACTERIA SPEC ANAEROBE CULT: NORMAL
BASOPHILS # BLD AUTO: 0.01 K/UL
BASOPHILS NFR BLD: 0.3 %
BILIRUB SERPL-MCNC: 2.2 MG/DL
BUN SERPL-MCNC: 101 MG/DL
CALCIUM SERPL-MCNC: 9.5 MG/DL
CHLORIDE SERPL-SCNC: 82 MMOL/L
CO2 SERPL-SCNC: 28 MMOL/L
CREAT SERPL-MCNC: 1.3 MG/DL
DIFFERENTIAL METHOD: ABNORMAL
EOSINOPHIL # BLD AUTO: 0 K/UL
EOSINOPHIL NFR BLD: 0.5 %
ERYTHROCYTE [DISTWIDTH] IN BLOOD BY AUTOMATED COUNT: 15.4 %
EST. GFR  (AFRICAN AMERICAN): 48.4 ML/MIN/1.73 M^2
EST. GFR  (NON AFRICAN AMERICAN): 42 ML/MIN/1.73 M^2
GLUCOSE SERPL-MCNC: 125 MG/DL
HCT VFR BLD AUTO: 24.9 %
HGB BLD-MCNC: 8.4 G/DL
IMM GRANULOCYTES # BLD AUTO: 0.01 K/UL
IMM GRANULOCYTES NFR BLD AUTO: 0.3 %
INR PPP: 1.1
LYMPHOCYTES # BLD AUTO: 0.3 K/UL
LYMPHOCYTES NFR BLD: 9.1 %
MAGNESIUM SERPL-MCNC: 2.6 MG/DL
MCH RBC QN AUTO: 31 PG
MCHC RBC AUTO-ENTMCNC: 33.7 G/DL
MCV RBC AUTO: 92 FL
MONOCYTES # BLD AUTO: 0.5 K/UL
MONOCYTES NFR BLD: 12.3 %
NEUTROPHILS # BLD AUTO: 2.9 K/UL
NEUTROPHILS NFR BLD: 77.5 %
NRBC BLD-RTO: 0 /100 WBC
PHOSPHATE SERPL-MCNC: 3.1 MG/DL
PLATELET # BLD AUTO: 81 K/UL
PMV BLD AUTO: 11.1 FL
POCT GLUCOSE: 118 MG/DL (ref 70–110)
POCT GLUCOSE: 146 MG/DL (ref 70–110)
POCT GLUCOSE: 187 MG/DL (ref 70–110)
POCT GLUCOSE: 203 MG/DL (ref 70–110)
POCT GLUCOSE: 207 MG/DL (ref 70–110)
POTASSIUM SERPL-SCNC: 3.5 MMOL/L
PROT SERPL-MCNC: 5.9 G/DL
PROTHROMBIN TIME: 11.3 SEC
RBC # BLD AUTO: 2.71 M/UL
SODIUM SERPL-SCNC: 122 MMOL/L
WBC # BLD AUTO: 3.75 K/UL

## 2018-10-03 PROCEDURE — 85025 COMPLETE CBC W/AUTO DIFF WBC: CPT | Mod: NTX

## 2018-10-03 PROCEDURE — 83735 ASSAY OF MAGNESIUM: CPT | Mod: NTX

## 2018-10-03 PROCEDURE — 36415 COLL VENOUS BLD VENIPUNCTURE: CPT | Mod: NTX

## 2018-10-03 PROCEDURE — 25000003 PHARM REV CODE 250: Mod: NTX | Performed by: NURSE PRACTITIONER

## 2018-10-03 PROCEDURE — 85610 PROTHROMBIN TIME: CPT | Mod: NTX

## 2018-10-03 PROCEDURE — 63600175 PHARM REV CODE 636 W HCPCS: Mod: NTX | Performed by: NURSE PRACTITIONER

## 2018-10-03 PROCEDURE — P9045 ALBUMIN (HUMAN), 5%, 250 ML: HCPCS | Mod: JG,NTX | Performed by: NURSE PRACTITIONER

## 2018-10-03 PROCEDURE — 84100 ASSAY OF PHOSPHORUS: CPT | Mod: NTX

## 2018-10-03 PROCEDURE — 99232 SBSQ HOSP IP/OBS MODERATE 35: CPT | Mod: NTX,,, | Performed by: NURSE PRACTITIONER

## 2018-10-03 PROCEDURE — 20600001 HC STEP DOWN PRIVATE ROOM: Mod: NTX

## 2018-10-03 PROCEDURE — 63600175 PHARM REV CODE 636 W HCPCS: Mod: JG,NTX | Performed by: NURSE PRACTITIONER

## 2018-10-03 PROCEDURE — 80053 COMPREHEN METABOLIC PANEL: CPT | Mod: NTX

## 2018-10-03 PROCEDURE — 99233 SBSQ HOSP IP/OBS HIGH 50: CPT | Mod: NTX,,, | Performed by: NURSE PRACTITIONER

## 2018-10-03 RX ORDER — ALBUMIN HUMAN 50 G/1000ML
12.5 SOLUTION INTRAVENOUS ONCE
Status: COMPLETED | OUTPATIENT
Start: 2018-10-03 | End: 2018-10-03

## 2018-10-03 RX ORDER — ALBUMIN HUMAN 50 G/1000ML
12.5 SOLUTION INTRAVENOUS ONCE
Status: DISCONTINUED | OUTPATIENT
Start: 2018-10-03 | End: 2018-10-03

## 2018-10-03 RX ADMIN — LACTULOSE 30 G: 20 SOLUTION ORAL at 09:10

## 2018-10-03 RX ADMIN — DOCUSATE SODIUM 100 MG: 100 CAPSULE, LIQUID FILLED ORAL at 09:10

## 2018-10-03 RX ADMIN — MIDODRINE HYDROCHLORIDE 15 MG: 5 TABLET ORAL at 06:10

## 2018-10-03 RX ADMIN — MIDODRINE HYDROCHLORIDE 15 MG: 5 TABLET ORAL at 01:10

## 2018-10-03 RX ADMIN — LACTULOSE 30 G: 20 SOLUTION ORAL at 08:10

## 2018-10-03 RX ADMIN — DOCUSATE SODIUM 100 MG: 100 CAPSULE, LIQUID FILLED ORAL at 08:10

## 2018-10-03 RX ADMIN — DOCUSATE SODIUM 100 MG: 100 CAPSULE, LIQUID FILLED ORAL at 02:10

## 2018-10-03 RX ADMIN — INSULIN ASPART 17 UNITS: 100 INJECTION, SOLUTION INTRAVENOUS; SUBCUTANEOUS at 12:10

## 2018-10-03 RX ADMIN — HEPARIN SODIUM 5000 UNITS: 5000 INJECTION, SOLUTION INTRAVENOUS; SUBCUTANEOUS at 01:10

## 2018-10-03 RX ADMIN — INSULIN ASPART 2 UNITS: 100 INJECTION, SOLUTION INTRAVENOUS; SUBCUTANEOUS at 06:10

## 2018-10-03 RX ADMIN — INSULIN ASPART 17 UNITS: 100 INJECTION, SOLUTION INTRAVENOUS; SUBCUTANEOUS at 08:10

## 2018-10-03 RX ADMIN — SODIUM BICARBONATE 650 MG TABLET 1300 MG: at 02:10

## 2018-10-03 RX ADMIN — INSULIN ASPART 17 UNITS: 100 INJECTION, SOLUTION INTRAVENOUS; SUBCUTANEOUS at 06:10

## 2018-10-03 RX ADMIN — RIFAXIMIN 550 MG: 550 TABLET ORAL at 09:10

## 2018-10-03 RX ADMIN — LACTULOSE 30 G: 20 SOLUTION ORAL at 02:10

## 2018-10-03 RX ADMIN — INSULIN DETEMIR 20 UNITS: 100 INJECTION, SOLUTION SUBCUTANEOUS at 09:10

## 2018-10-03 RX ADMIN — SODIUM BICARBONATE 650 MG TABLET 1300 MG: at 08:10

## 2018-10-03 RX ADMIN — INSULIN ASPART 2 UNITS: 100 INJECTION, SOLUTION INTRAVENOUS; SUBCUTANEOUS at 08:10

## 2018-10-03 RX ADMIN — ENTECAVIR 0.5 MG: 0.5 TABLET ORAL at 08:10

## 2018-10-03 RX ADMIN — ALBUMIN HUMAN 12.5 G: 0.05 INJECTION, SOLUTION INTRAVENOUS at 10:10

## 2018-10-03 RX ADMIN — MIDODRINE HYDROCHLORIDE 15 MG: 5 TABLET ORAL at 09:10

## 2018-10-03 RX ADMIN — HEPARIN SODIUM 5000 UNITS: 5000 INJECTION, SOLUTION INTRAVENOUS; SUBCUTANEOUS at 09:10

## 2018-10-03 RX ADMIN — RIFAXIMIN 550 MG: 550 TABLET ORAL at 08:10

## 2018-10-03 RX ADMIN — SODIUM BICARBONATE 650 MG TABLET 1300 MG: at 09:10

## 2018-10-03 NOTE — PROGRESS NOTES
[facility]  Liver Transplant  Progress Note    Patient Name: Scarlett Reddy  MRN: [id number]  Admission Date: 10/1/2018  Hospital Length of Stay: 1 days  Code Status: Full Code  Primary Care Provider: Primary Doctor No    Subjective:     History of Present Illness:  Ms. Reddy is a 69 y/o female with PMH of ESLD secondary Hep B and D.  Listed for liver transplant with MELD 23.  Paracentesis 10/1 with 5L removed, no fluid sent for analysis.  Morning labs significant for hyponatremia, Na 119.  Pt admitted to LTS for sodium monitoring.    Hospital Course:  Interval history: No acute events overnight. Na remains 122. Continue 1 L fluid restriction. SBP 70-80s. Albumin 5% 250 ml x 1. BP improved to 90/60s. Continue midodrine TID. MELD 24 today, Listed with MELD 25. Currently on internal hold for hyponatremia. Monitor.     Scheduled Meds:   docusate sodium  100 mg Oral TID    entecavir  0.5 mg Oral Daily    ergocalciferol  50,000 Units Oral Q7 Days    heparin (porcine)  5,000 Units Subcutaneous Q8H    insulin aspart U-100  17 Units Subcutaneous TIDWM    insulin detemir U-100  20 Units Subcutaneous QHS    lactulose  30 g Oral TID    lidocaine (PF) 10 mg/ml (1%)  1 mL Other Once    midodrine  15 mg Oral Q8H    potassium bicarbonate  50 mEq Oral Once    rifAXIMin  550 mg Oral BID    sodium bicarbonate  1,300 mg Oral TID     Continuous Infusions:  PRN Meds:dextrose 50%, dextrose 50%, glucagon (human recombinant), glucose, glucose, insulin aspart U-100, ondansetron, promethazine (PHENERGAN) IVPB, sodium chloride 0.9%    Review of Systems   Constitutional: Positive for activity change, appetite change and fatigue. Negative for chills, diaphoresis and fever.   HENT: Negative for congestion and facial swelling.    Eyes: Negative for pain, discharge and visual disturbance.   Respiratory: Negative for cough, chest tightness, shortness of breath and wheezing.    Cardiovascular: Positive for leg swelling.  Negative for chest pain and palpitations.   Gastrointestinal: Positive for abdominal distention. Negative for abdominal pain, constipation, diarrhea, nausea and vomiting.   Endocrine: Negative.    Genitourinary: Negative for decreased urine volume and dysuria.   Musculoskeletal: Negative for arthralgias and back pain.   Skin: Negative for wound.   Allergic/Immunologic: Negative for immunocompromised state.   Neurological: Positive for weakness. Negative for tremors.   Hematological: Negative.    Psychiatric/Behavioral: Negative for confusion. The patient is not nervous/anxious.      Objective:     Vital Signs (Most Recent):  Temp: 98 °F (36.7 °C) (10/02/18 1155)  Pulse: 82 (10/02/18 1200)  Resp: 13 (10/02/18 1200)  BP: 100/63 (10/02/18 1200)  SpO2: 96 % (10/02/18 1200) Vital Signs (24h Range):  Temp:  [98 °F (36.7 °C)-98.1 °F (36.7 °C)] 98 °F (36.7 °C)  Pulse:  [82-93] 82  Resp:  [11-25] 13  SpO2:  [96 %-100 %] 96 %  BP: ()/(45-63) 100/63     Weight: 55 kg (121 lb 4.1 oz)  Body mass index is 21.48 kg/m².    Intake/Output - Last 3 Shifts       09/30 0700 - 10/01 0659 10/01 0700 - 10/02 0659 10/02 0700 - 10/03 0659    P.O.  0 250    Total Intake(mL/kg)  0 (0) 250 (4.5)    Net  0 +250           Urine Occurrence  1 x     Stool Occurrence  0 x           Physical Exam   Constitutional: She is oriented to person, place, and time. She appears well-developed. No distress.   Temporal and distal extremity muscle wasting   HENT:   Head: Normocephalic and atraumatic.   Eyes: Pupils are equal, round, and reactive to light. No scleral icterus.   Neck: Normal range of motion. Neck supple.   Cardiovascular: Normal rate, regular rhythm and normal heart sounds.   Pulmonary/Chest: Effort normal and breath sounds normal. She has no rales.   Abdominal: Soft. Bowel sounds are normal. She exhibits distension (ascites). There is no tenderness.   Musculoskeletal: She exhibits edema.   Neurological: She is alert and oriented to person,  place, and time.   Skin: Skin is warm and dry. She is not diaphoretic.   Psychiatric: She has a normal mood and affect. Her behavior is normal.   Nursing note and vitals reviewed.      Laboratory:  Immunosuppressants     None        CBC:   Recent Labs   Lab  10/02/18   0535   WBC  2.65*   RBC  2.51*   HGB  8.0*   HCT  22.5*   PLT  82*   MCV  90   MCH  31.9*   MCHC  35.6     CMP:   Recent Labs   Lab  10/02/18   0535   GLU  131*   CALCIUM  9.0   ALBUMIN  4.1   PROT  5.8*   NA  122*   K  3.2*   CO2  25   CL  83*   BUN  93*   CREATININE  1.0   ALKPHOS  275*   ALT  70*   AST  115*   BILITOT  2.0*     Labs within the past 24 hours have been reviewed.    Diagnostic Results:  I have personally reviewed all pertinent imaging studies.    Assessment/Plan:     * Hyponatremia    - Na 119 on admit.  - Na 122.  - continue 1 L fluid restriction and low sodium diet.             Liver transplant candidate    - currently on internal hold for hyponatremia.            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  - continue lactulose and rifaximin.            Ascites    - paracentesis 10/1, 5L removed, no fluid sent for analysis.  - Paracentesis ordered for Friday 10/5. Limit 4 L max.           Severe malnutrition    - dietary consulted.          Hypokalemia    - stable.           Type 2 diabetes mellitus with diabetic polyneuropathy, with long-term current use of insulin    - continue home regimen.  - endocrine consulted.          Hypotension    - SBP 78/46 manual.   - Albumin 5% 250 ml x 1 dose.  - BP  improved to 90/60s.  - continue Midodrine 15 mg TID.               VTE Risk Mitigation (From admission, onward)        Ordered     heparin (porcine) injection 5,000 Units  Every 8 hours      10/01/18 1617     Place OLAF hose  Until discontinued      10/01/18 1617     Place sequential compression device  Until discontinued      10/01/18 1617     IP VTE LOW RISK PATIENT  Once      10/01/18 1617          The patients clinical status was discussed at multidisplinary rounds, involving transplant surgery, transplant medicine, pharmacy, nursing, nutrition, and social work    Discharge Planning: not a candidate for dc at this time.       Kimber Valle, KEEGAN  Liver Transplant  [facility]

## 2018-10-03 NOTE — PLAN OF CARE
Problem: Patient Care Overview  Goal: Plan of Care Review  Outcome: Ongoing (interventions implemented as appropriate)  Recommendation/Intervention:   1. Encourage good po intake as tolerated   2. Boost Plus- Strawberry TID   3. Dietitian Following    Goals: Patient to meet >85% EEN/EPN  Nutrition Goal Status: new      Malnutrition in the context of Chronic Illness/Injury    Related to (etiology):  Pre OLTx ESLD    Signs and Symptoms (as evidenced by):  Energy Intake: <75% of estimated energy requirement for 2 months  Body Fat Depletion: Severe depletion of orbitals and triceps   Muscle Mass Depletion: Severe depletion of temples, clavicle region and lower extremities   Weight Loss: 12.31% x 1 month    Nutrition Diagnosis Status:  Worsening    Full assessment completed, see Dietitian Note 10/3/2018.

## 2018-10-03 NOTE — PLAN OF CARE
Problem: Patient Care Overview  Goal: Plan of Care Review  -Pt free from fall or injury so far this shift. Instructed to call if assistance needed, verbalized understanding.   -BG checked AC/HS.  when trays arrived to the floor, pt did not place meal order, so pt did not have a tray delivered. BG checked again once tray received, , 17 units scheduled Novolog given.   -BP low today (SBP 70-80's), 1 x dose of Albumin given, BP improved (SBP 's).  -Na level 122, 1 L FR maintained.  -Para (diagnostic & therapeutic) scheduled for Friday 10/5.    -Pt did not have a BM today, lactulose and Colace continued.   -Denies pain or discomfort.   -Afebrile. Hand hygiene reinforced. Daily labs monitored.

## 2018-10-03 NOTE — PROGRESS NOTES
BP 89/42, Midodrine given @ 0600. Informed Kimber, NP, no new orders given. Will continue to monitor.

## 2018-10-03 NOTE — CONSULTS
[facility]  Adult Nutrition  Consult Note     SUMMARY       Recommendations  Recommendation/Intervention:   1. Encourage good po intake as tolerated   2. Boost Plus- Strawberry TID   3. Dietitian Following    Goals: Patient to meet >85% EEN/EPN  Nutrition Goal Status: new  Communication of RD Recs: (POC)    General Information Comments: Consult received - Poor Nutrition. Pt with Hyponatremia, Pre OLTx ESLD PMH of decompensated cirrhosis due to Hep B c/b ascites requiring biweekly paracentesis. Language barrier. Multiple previous Low Na educations and reinforcement. Previous NFPE completed, 8/16/18- Severe Malnutrition in the context of Chronic Illness/Injury. Continued decreased appetite and weight (137lbs) since last Dietitian visit 9/11/18. Down 11.67% x 3 weeks. Down 12.31% x 1 month. NFPE completed- Severe body fat depletion of orbitals and triceps. Severe muscle mass depletion of temples, clavicle region and lower extremities. Consumes Boost Plus-strawberry.    Nutrition Discharge Planning: Adequate po intake    Reason for Assessment  Reason for Assessment: consult  Diagnosis: transplant/postoperative complications(Pre OLTx ESLD, Ascites, Hyponatremia)  Relevant Medical History: decompensated cirrhosis due to Hep B c/b ascites, DM  Interdisciplinary Rounds: attended    Nutrition Risk Screen  Nutrition Risk Screen: cultural or Jehovah's witness food preferences    Nutrition/Diet History  Patient Reported Diet/Restrictions/Preferences: diabetic diet, low salt(Cultural preferences)  Food Preferences: Cultural preferences Noted  Do you have any cultural, spiritual, Jehovah's witness conflicts, given your current situation?: yes  Supplemental Drinks or Food Habits: Boost Plus, Ensure Plus(Strawberry)  Food Allergies: NKFA  Factors Affecting Nutritional Intake: abdominal distention, cultural/Jehovah's witness beliefs, decreased appetite(Weak)    Anthropometrics  Temp: 98.2 °F (36.8 °C)  Height Method: Stated  Height: 5'  "3" (160 cm)  Height (inches): 63 in  Weight Method: Bed Scale  Weight: 55 kg (121 lb 4.1 oz)  Weight (lb): 121.25 lb  Ideal Body Weight (IBW), Female: 115 lb  % Ideal Body Weight, Female (lb): 105.43 lb  BMI (Calculated): 21.5  BMI Grade: 18.5-24.9 - normal  Weight Loss: unintentional  Weight Loss Since Admission: 16 lb 0.4 oz     Lab/Procedures/Meds  Labs: Reviewed    (L) 10/03/2018    K 3.5 10/03/2018    CL 82 (L) 10/03/2018    CO2 28 10/03/2018     (H) 10/03/2018    CREATININE 1.3 10/03/2018    CALCIUM 9.5 10/03/2018    PHOS 3.1 10/03/2018    MG 2.6 10/03/2018    ESTGFRAFRICA 48.4 (A) 10/03/2018    EGFRNONAA 42.0 (A) 10/03/2018    ALBUMIN 4.0 10/03/2018      ALT 93 (H) 10/03/2018     (H) 10/03/2018    ALKPHOS 357 (H) 10/03/2018     Meds: Reviewed  Scheduled Meds:   docusate sodium  100 mg Oral TID    entecavir  0.5 mg Oral Daily    ergocalciferol  50,000 Units Oral Q7 Days    heparin (porcine)  5,000 Units Subcutaneous Q8H    insulin aspart U-100  17 Units Subcutaneous TIDWM    insulin detemir U-100  20 Units Subcutaneous QHS    lactulose  30 g Oral TID    lidocaine (PF) 10 mg/ml (1%)  1 mL Other Once    midodrine  15 mg Oral Q8H    rifAXIMin  550 mg Oral BID    sodium bicarbonate  1,300 mg Oral TID       Physical Findings/Assessment  Overall Physical Appearance: loss of subcutaneous fat, loss of muscle mass, weak  Oral/Mouth Cavity: tooth/teeth missing  Skin: intact    Estimated/Assessed Needs  Weight Used For Calorie Calculations: 55 kg (121 lb 4.1 oz)  Energy Calorie Requirements (kcal): 1614-3094  Energy Need Method: Kcal/kg(25-30 kcal/kg)  Protein Requirements: 55-82(1.0-1.5 gm/kg)  Weight Used For Protein Calculations: 55 kg (121 lb 4.1 oz)  Fluid Requirements (mL): Per MD  Fluid Need Method: RDA Method(Per MD)  RDA Method (mL): 1375     Nutrition Prescription Ordered  Current Diet Order: Low Na, 2gm  Nutrition Order Comments: 1L FR    Evaluation of Received Nutrient/Fluid " Intake in last 24h  I/O: -470ml since admit  Comments: LBM 10/2/18  % Intake of Estimated Energy Needs: 50 - 75 %  % Meal Intake: 50 - 75 %    Nutrition Risk  Level of Risk/Frequency of Follow-up: low(1 x week)     Assessment and Plan  Malnutrition in the context of Chronic Illness/Injury    Related to (etiology):  Pre OLTx ESLD    Signs and Symptoms (as evidenced by):  Energy Intake: <75% of estimated energy requirement for 2 months  Body Fat Depletion: Severe depletion of orbitals and triceps   Muscle Mass Depletion: Severe depletion of temples, clavicle region and lower extremities   Weight Loss: 12.31% x 1 month    Interventions/Recommendations (treatment strategy):  1. Encourage good po intake as tolerated  2. Boost Plus- Strawberry TID  3. Dietitian Following    Nutrition Diagnosis Status:  Worsening    Monitor and Evaluation  Food and Nutrient Intake: energy intake, food and beverage intake  Food and Nutrient Adminstration: diet order  Physical Activity and Function: nutrition-related ADLs and IADLs  Anthropometric Measurements: weight change, weight  Biochemical Data, Medical Tests and Procedures: electrolyte and renal panel, gastrointestinal profile, glucose/endocrine profile, inflammatory profile, lipid profile  Nutrition-Focused Physical Findings: overall appearance     Nutrition Follow-Up  RD Follow-up?: Yes

## 2018-10-03 NOTE — PLAN OF CARE
Problem: Patient Care Overview  Goal: Plan of Care Review  Outcome: Ongoing (interventions implemented as appropriate)  AAOX4.  VSS.  No complaints of pain.  Pt taking lactulose, bowel movements noted.   Pt up and ambulating in room.  Accuchecks AC, HS, last BS was 123.  Daughter is at the bedside.  Bed is in lowest position, call bell is in reach, and pt instructed to call nurse when needing assistance.  Will continue to monitor.

## 2018-10-03 NOTE — PROGRESS NOTES
"[facility]  Endocrinology  Progress Note    Admit Date: 10/1/2018     Reason for Consult: Management of type 2 DM, Hyperglycemia      Surgical Procedure and Date: n/a      Diabetes diagnosis year:      Home Diabetes Medications:  Lantus 18 units HS and novolog 17 units with meals plus correction scale (150-200 +1)        Lab Results   Component Value Date     HGBA1C 6.8 (H) 2018        How often checking glucose at home? 3-4   BG readings on regimen: 120-150.  Post prandial readings as high as upper 200s  Hypoglycemia on the regimen? yes, none recently   Missed doses on regimen?  No     Diabetes Complications include:     Diabetic peripheral neuropathy      Complicating diabetes co morbidities:   CIRRHOSIS        HPI:  Patient is a 68 y.o. female with a diagnosis of ESLD, listed for liver transplant with meld 23.  Admitted 10/1/18 for hyponatremia s/p paracentesis.    She speaks primarily Romansh. Information obtained from recent past admission and discussion over the phone via  (idemama). Family not at bedside. Pt request family at bedside for future conversations.   Personal has known diagnosis of diabetes, endocrine consulted for diabetes management.        Interval HPI:   Overnight events:  Remains on TSU.  BG at or below goal overnight.  Eatin% - mostly eating meals from outside hospital  Nausea: Yes - in am related to medications  Hypoglycemia and intervention: No  Fever: No  TPN and/or TF: No    BP (!) 89/42 (BP Location: Right arm, Patient Position: Sitting)   Pulse 81   Temp 98.2 °F (36.8 °C) (Oral)   Resp 18   Ht 5' 3" (1.6 m)   Wt 55 kg (121 lb 4.1 oz)   LMP  (LMP Unknown)   SpO2 95%   Breastfeeding? No   BMI 21.48 kg/m²      Labs Reviewed and Include    Recent Labs   Lab  10/03/18   0638   GLU  125*   CALCIUM  9.5   ALBUMIN  4.0   PROT  5.9*   NA  122*   K  3.5   CO2  28   CL  82*   BUN  101*   CREATININE  1.3   ALKPHOS  357*   ALT  93*   AST  145* "   BILITOT  2.2*     Lab Results   Component Value Date    WBC 3.75 (L) 10/03/2018    HGB 8.4 (L) 10/03/2018    HCT 24.9 (L) 10/03/2018    MCV 92 10/03/2018    PLT 81 (L) 10/03/2018     No results for input(s): TSH, FREET4 in the last 168 hours.  Lab Results   Component Value Date    HGBA1C 6.8 (H) 08/16/2018       Nutritional status:   Body mass index is 21.48 kg/m².  Lab Results   Component Value Date    ALBUMIN 4.0 10/03/2018    ALBUMIN 4.1 10/02/2018    ALBUMIN 4.1 10/01/2018     Lab Results   Component Value Date    PREALBUMIN 12 (L) 09/17/2018       Estimated Creatinine Clearance: 33.8 mL/min (based on SCr of 1.3 mg/dL).    Accu-Checks  Recent Labs      10/01/18   1713  10/01/18   1758  10/01/18   1849  10/01/18   2114  10/02/18   0825  10/02/18   1343  10/02/18   1823  10/02/18   2155  10/03/18   0833   POCTGLUCOSE  224*  218*  212*  129*  124*  196*  215*  123*  146*       Current Medications and/or Treatments Impacting Glycemic Control  Immunotherapy:    Immunosuppressants     None        Steroids:   Hormones (From admission, onward)    None        Pressors:    Autonomic Drugs (From admission, onward)    Start     Stop Route Frequency Ordered    10/01/18 2200  midodrine tablet 15 mg      -- Oral Every 8 hours 10/01/18 1617        Hyperglycemia/Diabetes Medications:   Antihyperglycemics (From admission, onward)    Start     Stop Route Frequency Ordered    10/02/18 1645  insulin aspart U-100 pen 2 Units      -- SubQ As needed (PRN) 10/02/18 1546    10/02/18 1015  insulin aspart U-100 pen 0-5 Units      -- SubQ Before meals & nightly PRN 10/02/18 0915    10/01/18 2100  insulin detemir U-100 pen 20 Units      -- SubQ Nightly 10/01/18 1617    10/01/18 1715  insulin aspart U-100 pen 17 Units      -- SubQ 3 times daily with meals 10/01/18 1617          ASSESSMENT and PLAN    * Hyponatremia    Na 119 on admit 10/1  Managed per primary team          Type 2 diabetes mellitus with diabetic polyneuropathy, with  long-term current use of insulin    BG goal 140-180    Levemir 20 units q HS  Novolog 17 units with meals  Low dose correction scale insulin  PRN Novolog 2 units for boost/snack  BG monitoring AC/HS    Discharge planning:  Likely resume home medication regimen.  Assess for changes in insulin requirement close to d/c date        Liver transplant candidate    Liver listed, currently on hold for hyponatremia per primary team  Avoid hyoglycemia    Recent Labs   Lab  10/03/18   0638   ALT  93*   AST  145*   ALKPHOS  357*   BILITOT  2.2*   PROT  5.9*   ALBUMIN  4.0               Severe malnutrition    Oral intake encouraged, may affect BG readings        Ascites    Paracentesis prn per primary team.  Avoid hypoglycemia        Chronic liver failure without hepatic coma    Liver listed, currently on hold for hyponatremia per primary team  Avoid hyoglycemia            Be Willams NP  Endocrinology  [facility]

## 2018-10-03 NOTE — ASSESSMENT & PLAN NOTE
Liver listed, currently on hold for hyponatremia per primary team  Avoid hyoglycemia    Recent Labs   Lab  10/03/18   0638   ALT  93*   AST  145*   ALKPHOS  357*   BILITOT  2.2*   PROT  5.9*   ALBUMIN  4.0

## 2018-10-03 NOTE — ASSESSMENT & PLAN NOTE
Nutrition Problem  Severe Malnutrition in the context of Chronic Illness/Injury    Related to (etiology):  ESLD    Signs and Symptoms (as evidenced by):  Energy Intake: <75% of estimated energy requirement for 2 months  Body Fat Depletion: Severe depletion of orbitals and triceps   Muscle Mass Depletion: Severe depletion of temples, clavicle region and lower extremities   Weight Loss: 12.31% x 1 month    Nutrition Diagnosis Status:  Worsening

## 2018-10-03 NOTE — ASSESSMENT & PLAN NOTE
- SBP 78/46 manual.   - Albumin 5% 250 ml x 1 dose 10/3/18  - BP improved to 90/60s.  - continue Midodrine 15 mg TID.

## 2018-10-03 NOTE — SUBJECTIVE & OBJECTIVE
"Interval HPI:   Overnight events:  Remains on TSU.  BG at or below goal overnight.  Eatin% - mostly eating meals from outside hospital  Nausea: Yes - in am related to medications  Hypoglycemia and intervention: No  Fever: No  TPN and/or TF: No    BP (!) 89/42 (BP Location: Right arm, Patient Position: Sitting)   Pulse 81   Temp 98.2 °F (36.8 °C) (Oral)   Resp 18   Ht 5' 3" (1.6 m)   Wt 55 kg (121 lb 4.1 oz)   LMP  (LMP Unknown)   SpO2 95%   Breastfeeding? No   BMI 21.48 kg/m²     Labs Reviewed and Include    Recent Labs   Lab  10/03/18   0638   GLU  125*   CALCIUM  9.5   ALBUMIN  4.0   PROT  5.9*   NA  122*   K  3.5   CO2  28   CL  82*   BUN  101*   CREATININE  1.3   ALKPHOS  357*   ALT  93*   AST  145*   BILITOT  2.2*     Lab Results   Component Value Date    WBC 3.75 (L) 10/03/2018    HGB 8.4 (L) 10/03/2018    HCT 24.9 (L) 10/03/2018    MCV 92 10/03/2018    PLT 81 (L) 10/03/2018     No results for input(s): TSH, FREET4 in the last 168 hours.  Lab Results   Component Value Date    HGBA1C 6.8 (H) 2018       Nutritional status:   Body mass index is 21.48 kg/m².  Lab Results   Component Value Date    ALBUMIN 4.0 10/03/2018    ALBUMIN 4.1 10/02/2018    ALBUMIN 4.1 10/01/2018     Lab Results   Component Value Date    PREALBUMIN 12 (L) 2018       Estimated Creatinine Clearance: 33.8 mL/min (based on SCr of 1.3 mg/dL).    Accu-Checks  Recent Labs      10/01/18   1713  10/01/18   1758  10/01/18   1849  10/01/18   2114  10/02/18   0825  10/02/18   1343  10/02/18   1823  10/02/18   2155  10/03/18   0833   POCTGLUCOSE  224*  218*  212*  129*  124*  196*  215*  123*  146*       Current Medications and/or Treatments Impacting Glycemic Control  Immunotherapy:    Immunosuppressants     None        Steroids:   Hormones (From admission, onward)    None        Pressors:    Autonomic Drugs (From admission, onward)    Start     Stop Route Frequency Ordered    10/01/18 2200  midodrine tablet 15 mg      -- " Oral Every 8 hours 10/01/18 1617        Hyperglycemia/Diabetes Medications:   Antihyperglycemics (From admission, onward)    Start     Stop Route Frequency Ordered    10/02/18 1645  insulin aspart U-100 pen 2 Units      -- SubQ As needed (PRN) 10/02/18 1546    10/02/18 1015  insulin aspart U-100 pen 0-5 Units      -- SubQ Before meals & nightly PRN 10/02/18 0915    10/01/18 2100  insulin detemir U-100 pen 20 Units      -- SubQ Nightly 10/01/18 1617    10/01/18 1715  insulin aspart U-100 pen 17 Units      -- SubQ 3 times daily with meals 10/01/18 1617

## 2018-10-04 ENCOUNTER — TELEPHONE (OUTPATIENT)
Dept: TRANSPLANT | Facility: CLINIC | Age: 68
End: 2018-10-04

## 2018-10-04 ENCOUNTER — ANESTHESIA EVENT (OUTPATIENT)
Dept: SURGERY | Facility: HOSPITAL | Age: 68
DRG: 005 | End: 2018-10-04
Payer: COMMERCIAL

## 2018-10-04 ENCOUNTER — ANESTHESIA (OUTPATIENT)
Dept: SURGERY | Facility: HOSPITAL | Age: 68
DRG: 005 | End: 2018-10-04
Payer: COMMERCIAL

## 2018-10-04 LAB
ALBUMIN SERPL BCP-MCNC: 3.5 G/DL
ALBUMIN SERPL BCP-MCNC: 3.9 G/DL
ALBUMIN SERPL BCP-MCNC: 4.2 G/DL
ALP SERPL-CCNC: 332 U/L
ALP SERPL-CCNC: 369 U/L
ALT SERPL W/O P-5'-P-CCNC: 81 U/L
ALT SERPL W/O P-5'-P-CCNC: 86 U/L
ANION GAP SERPL CALC-SCNC: 11 MMOL/L
ANION GAP SERPL CALC-SCNC: 14 MMOL/L
APTT BLDCRRT: 32.6 SEC
APTT BLDCRRT: 36.3 SEC
AST SERPL-CCNC: 118 U/L
AST SERPL-CCNC: 121 U/L
BASOPHILS # BLD AUTO: 0.01 K/UL
BASOPHILS # BLD AUTO: 0.02 K/UL
BASOPHILS NFR BLD: 0.3 %
BASOPHILS NFR BLD: 0.5 %
BILIRUB DIRECT SERPL-MCNC: 0.8 MG/DL
BILIRUB SERPL-MCNC: 2.1 MG/DL
BILIRUB SERPL-MCNC: 2.6 MG/DL
BLD PROD TYP BPU: NORMAL
BLOOD UNIT EXPIRATION DATE: NORMAL
BLOOD UNIT TYPE CODE: 6200
BLOOD UNIT TYPE: NORMAL
BUN SERPL-MCNC: 95 MG/DL
BUN SERPL-MCNC: 96 MG/DL
CA-I BLDV-SCNC: 1 MMOL/L
CALCIUM SERPL-MCNC: 9.5 MG/DL
CALCIUM SERPL-MCNC: 9.7 MG/DL
CHLORIDE SERPL-SCNC: 80 MMOL/L
CHLORIDE SERPL-SCNC: 82 MMOL/L
CO2 SERPL-SCNC: 29 MMOL/L
CO2 SERPL-SCNC: 33 MMOL/L
CODING SYSTEM: NORMAL
CREAT SERPL-MCNC: 1.4 MG/DL
CREAT SERPL-MCNC: 1.5 MG/DL
DIFFERENTIAL METHOD: ABNORMAL
DIFFERENTIAL METHOD: ABNORMAL
DISPENSE STATUS: NORMAL
EOSINOPHIL # BLD AUTO: 0 K/UL
EOSINOPHIL # BLD AUTO: 0 K/UL
EOSINOPHIL NFR BLD: 0.2 %
EOSINOPHIL NFR BLD: 1 %
ERYTHROCYTE [DISTWIDTH] IN BLOOD BY AUTOMATED COUNT: 15 %
ERYTHROCYTE [DISTWIDTH] IN BLOOD BY AUTOMATED COUNT: 15.3 %
EST. GFR  (AFRICAN AMERICAN): 40.7 ML/MIN/1.73 M^2
EST. GFR  (AFRICAN AMERICAN): 44.2 ML/MIN/1.73 M^2
EST. GFR  (NON AFRICAN AMERICAN): 35.3 ML/MIN/1.73 M^2
EST. GFR  (NON AFRICAN AMERICAN): 38.4 ML/MIN/1.73 M^2
ESTIMATED AVG GLUCOSE: 126 MG/DL
FIBRINOGEN PPP-MCNC: 264 MG/DL
FIBRINOGEN PPP-MCNC: 286 MG/DL
GLUCOSE SERPL-MCNC: 109 MG/DL
GLUCOSE SERPL-MCNC: 211 MG/DL
GLUCOSE SERPL-MCNC: 222 MG/DL
HBA1C MFR BLD HPLC: 6 %
HCT VFR BLD AUTO: 23.7 %
HCT VFR BLD AUTO: 24.4 %
HCT VFR BLD AUTO: 26.5 %
HGB BLD-MCNC: 8.1 G/DL
HGB BLD-MCNC: 8.3 G/DL
HGB BLD-MCNC: 9.1 G/DL
IMM GRANULOCYTES # BLD AUTO: 0.01 K/UL
IMM GRANULOCYTES # BLD AUTO: 0.01 K/UL
IMM GRANULOCYTES NFR BLD AUTO: 0.2 %
IMM GRANULOCYTES NFR BLD AUTO: 0.3 %
INR PPP: 1.1
LYMPHOCYTES # BLD AUTO: 0.3 K/UL
LYMPHOCYTES # BLD AUTO: 0.5 K/UL
LYMPHOCYTES NFR BLD: 10.5 %
LYMPHOCYTES NFR BLD: 10.9 %
MAGNESIUM SERPL-MCNC: 2.6 MG/DL
MAGNESIUM SERPL-MCNC: 2.8 MG/DL
MAGNESIUM SERPL-MCNC: 2.8 MG/DL
MCH RBC QN AUTO: 30.7 PG
MCH RBC QN AUTO: 31.1 PG
MCHC RBC AUTO-ENTMCNC: 34 G/DL
MCHC RBC AUTO-ENTMCNC: 34.3 G/DL
MCV RBC AUTO: 90 FL
MCV RBC AUTO: 91 FL
MONOCYTES # BLD AUTO: 0.4 K/UL
MONOCYTES # BLD AUTO: 0.5 K/UL
MONOCYTES NFR BLD: 14.9 %
MONOCYTES NFR BLD: 9.9 %
NEUTROPHILS # BLD AUTO: 2.3 K/UL
NEUTROPHILS # BLD AUTO: 3.3 K/UL
NEUTROPHILS NFR BLD: 73 %
NEUTROPHILS NFR BLD: 78.3 %
NRBC BLD-RTO: 0 /100 WBC
NRBC BLD-RTO: 0 /100 WBC
PHOSPHATE SERPL-MCNC: 3.9 MG/DL
PLATELET # BLD AUTO: 74 K/UL
PLATELET # BLD AUTO: 90 K/UL
PMV BLD AUTO: 11.1 FL
PMV BLD AUTO: 11.6 FL
POCT GLUCOSE: 126 MG/DL (ref 70–110)
POCT GLUCOSE: 245 MG/DL (ref 70–110)
POCT GLUCOSE: 252 MG/DL (ref 70–110)
POTASSIUM SERPL-SCNC: 3.1 MMOL/L
POTASSIUM SERPL-SCNC: 3.6 MMOL/L
POTASSIUM SERPL-SCNC: 3.7 MMOL/L
PROT SERPL-MCNC: 5.8 G/DL
PROT SERPL-MCNC: 6.3 G/DL
PROTHROMBIN TIME: 11.1 SEC
PROTHROMBIN TIME: 11.1 SEC
PROTHROMBIN TIME: 11.4 SEC
RBC # BLD AUTO: 2.67 M/UL
RBC # BLD AUTO: 2.96 M/UL
SODIUM SERPL-SCNC: 123 MMOL/L
SODIUM SERPL-SCNC: 124 MMOL/L
SODIUM SERPL-SCNC: 125 MMOL/L
TRANS PLATPHERESIS VOL PATIENT: NORMAL ML
WBC # BLD AUTO: 3.15 K/UL
WBC # BLD AUTO: 4.23 K/UL

## 2018-10-04 PROCEDURE — 84132 ASSAY OF SERUM POTASSIUM: CPT

## 2018-10-04 PROCEDURE — D9220A PRA ANESTHESIA: Mod: ANES,,, | Performed by: ANESTHESIOLOGY

## 2018-10-04 PROCEDURE — 85018 HEMOGLOBIN: CPT

## 2018-10-04 PROCEDURE — 36000930 HC OR TIME LEV VII 1ST 15 MIN: Performed by: SURGERY

## 2018-10-04 PROCEDURE — P9035 PLATELET PHERES LEUKOREDUCED: HCPCS

## 2018-10-04 PROCEDURE — 99499 UNLISTED E&M SERVICE: CPT | Mod: ,,, | Performed by: TRANSPLANT SURGERY

## 2018-10-04 PROCEDURE — 85384 FIBRINOGEN ACTIVITY: CPT | Mod: 91

## 2018-10-04 PROCEDURE — 84295 ASSAY OF SERUM SODIUM: CPT

## 2018-10-04 PROCEDURE — 82248 BILIRUBIN DIRECT: CPT

## 2018-10-04 PROCEDURE — 93010 ELECTROCARDIOGRAM REPORT: CPT | Mod: ,,, | Performed by: INTERNAL MEDICINE

## 2018-10-04 PROCEDURE — 85049 AUTOMATED PLATELET COUNT: CPT

## 2018-10-04 PROCEDURE — 37000008 HC ANESTHESIA 1ST 15 MINUTES: Performed by: SURGERY

## 2018-10-04 PROCEDURE — 85025 COMPLETE CBC W/AUTO DIFF WBC: CPT | Mod: NTX

## 2018-10-04 PROCEDURE — 25000003 PHARM REV CODE 250: Mod: NTX | Performed by: NURSE PRACTITIONER

## 2018-10-04 PROCEDURE — 85730 THROMBOPLASTIN TIME PARTIAL: CPT

## 2018-10-04 PROCEDURE — 83735 ASSAY OF MAGNESIUM: CPT | Mod: NTX

## 2018-10-04 PROCEDURE — 85610 PROTHROMBIN TIME: CPT | Mod: 91

## 2018-10-04 PROCEDURE — 12000002 HC ACUTE/MED SURGE SEMI-PRIVATE ROOM: Mod: NTX

## 2018-10-04 PROCEDURE — 63600175 PHARM REV CODE 636 W HCPCS: Mod: NTX | Performed by: NURSE PRACTITIONER

## 2018-10-04 PROCEDURE — 82330 ASSAY OF CALCIUM: CPT

## 2018-10-04 PROCEDURE — 93005 ELECTROCARDIOGRAM TRACING: CPT | Mod: NTX

## 2018-10-04 PROCEDURE — 81001 URINALYSIS AUTO W/SCOPE: CPT

## 2018-10-04 PROCEDURE — 82947 ASSAY GLUCOSE BLOOD QUANT: CPT

## 2018-10-04 PROCEDURE — 86850 RBC ANTIBODY SCREEN: CPT

## 2018-10-04 PROCEDURE — 63600175 PHARM REV CODE 636 W HCPCS: Performed by: NURSE ANESTHETIST, CERTIFIED REGISTERED

## 2018-10-04 PROCEDURE — 83036 HEMOGLOBIN GLYCOSYLATED A1C: CPT

## 2018-10-04 PROCEDURE — 80053 COMPREHEN METABOLIC PANEL: CPT | Mod: 91

## 2018-10-04 PROCEDURE — 27100019 HC AMBU BAG ADULT/PED: Mod: NTX | Performed by: NURSE ANESTHETIST, CERTIFIED REGISTERED

## 2018-10-04 PROCEDURE — 36556 INSERT NON-TUNNEL CV CATH: CPT | Mod: 59,,, | Performed by: ANESTHESIOLOGY

## 2018-10-04 PROCEDURE — 82040 ASSAY OF SERUM ALBUMIN: CPT

## 2018-10-04 PROCEDURE — 37000009 HC ANESTHESIA EA ADD 15 MINS: Performed by: SURGERY

## 2018-10-04 PROCEDURE — 86920 COMPATIBILITY TEST SPIN: CPT

## 2018-10-04 PROCEDURE — 36000931 HC OR TIME LEV VII EA ADD 15 MIN: Performed by: SURGERY

## 2018-10-04 PROCEDURE — C1729 CATH, DRAINAGE: HCPCS | Performed by: SURGERY

## 2018-10-04 PROCEDURE — 27100021 HC MULTIPORT INFUSION MANIFOLD: Mod: NTX | Performed by: NURSE ANESTHETIST, CERTIFIED REGISTERED

## 2018-10-04 PROCEDURE — C1751 CATH, INF, PER/CENT/MIDLINE: HCPCS | Mod: NTX | Performed by: NURSE ANESTHETIST, CERTIFIED REGISTERED

## 2018-10-04 PROCEDURE — 93503 INSERT/PLACE HEART CATHETER: CPT | Mod: 59,,, | Performed by: ANESTHESIOLOGY

## 2018-10-04 PROCEDURE — 87086 URINE CULTURE/COLONY COUNT: CPT

## 2018-10-04 PROCEDURE — 83735 ASSAY OF MAGNESIUM: CPT | Mod: 91

## 2018-10-04 PROCEDURE — 76937 US GUIDE VASCULAR ACCESS: CPT | Mod: 26,,, | Performed by: ANESTHESIOLOGY

## 2018-10-04 PROCEDURE — 27201423 OPTIME MED/SURG SUP & DEVICES STERILE SUPPLY: Performed by: SURGERY

## 2018-10-04 PROCEDURE — 85730 THROMBOPLASTIN TIME PARTIAL: CPT | Mod: 91

## 2018-10-04 PROCEDURE — 36415 COLL VENOUS BLD VENIPUNCTURE: CPT | Mod: NTX

## 2018-10-04 PROCEDURE — 80053 COMPREHEN METABOLIC PANEL: CPT | Mod: NTX

## 2018-10-04 PROCEDURE — 27100025 HC TUBING, SET FLUID WARMER: Mod: NTX | Performed by: NURSE ANESTHETIST, CERTIFIED REGISTERED

## 2018-10-04 PROCEDURE — 36620 INSERTION CATHETER ARTERY: CPT | Mod: 59,,, | Performed by: ANESTHESIOLOGY

## 2018-10-04 PROCEDURE — 87186 SC STD MICRODIL/AGAR DIL: CPT

## 2018-10-04 PROCEDURE — 99499 UNLISTED E&M SERVICE: CPT | Mod: ,,, | Performed by: SURGERY

## 2018-10-04 PROCEDURE — 87077 CULTURE AEROBIC IDENTIFY: CPT

## 2018-10-04 PROCEDURE — 87088 URINE BACTERIA CULTURE: CPT

## 2018-10-04 PROCEDURE — 27800505 HC CATH, RADIAL ARTERY KIT: Mod: NTX | Performed by: NURSE ANESTHETIST, CERTIFIED REGISTERED

## 2018-10-04 PROCEDURE — 84100 ASSAY OF PHOSPHORUS: CPT | Mod: NTX

## 2018-10-04 PROCEDURE — 85014 HEMATOCRIT: CPT

## 2018-10-04 PROCEDURE — D9220A PRA ANESTHESIA: Mod: CRNA,,, | Performed by: NURSE ANESTHETIST, CERTIFIED REGISTERED

## 2018-10-04 PROCEDURE — 25000003 PHARM REV CODE 250: Performed by: NURSE ANESTHETIST, CERTIFIED REGISTERED

## 2018-10-04 PROCEDURE — 99233 SBSQ HOSP IP/OBS HIGH 50: CPT | Mod: NTX,,, | Performed by: NURSE PRACTITIONER

## 2018-10-04 PROCEDURE — 85384 FIBRINOGEN ACTIVITY: CPT

## 2018-10-04 PROCEDURE — 85610 PROTHROMBIN TIME: CPT | Mod: NTX

## 2018-10-04 RX ORDER — METHYLPREDNISOLONE SODIUM SUCCINATE 500 MG/8ML
500 INJECTION INTRAMUSCULAR; INTRAVENOUS
Status: COMPLETED | OUTPATIENT
Start: 2018-10-04 | End: 2018-10-05

## 2018-10-04 RX ORDER — SUCCINYLCHOLINE CHLORIDE 20 MG/ML
INJECTION INTRAMUSCULAR; INTRAVENOUS
Status: DISCONTINUED | OUTPATIENT
Start: 2018-10-04 | End: 2018-10-05

## 2018-10-04 RX ORDER — SODIUM BICARBONATE 650 MG/1
650 TABLET ORAL 2 TIMES DAILY
Status: DISCONTINUED | OUTPATIENT
Start: 2018-10-04 | End: 2018-10-04

## 2018-10-04 RX ORDER — HYDROCODONE BITARTRATE AND ACETAMINOPHEN 500; 5 MG/1; MG/1
TABLET ORAL
Status: DISCONTINUED | OUTPATIENT
Start: 2018-10-04 | End: 2018-10-05

## 2018-10-04 RX ORDER — PROPOFOL 10 MG/ML
VIAL (ML) INTRAVENOUS
Status: DISCONTINUED | OUTPATIENT
Start: 2018-10-04 | End: 2018-10-05

## 2018-10-04 RX ORDER — MAGNESIUM SULFATE HEPTAHYDRATE 40 MG/ML
2 INJECTION, SOLUTION INTRAVENOUS
Status: DISCONTINUED | OUTPATIENT
Start: 2018-10-04 | End: 2018-10-05

## 2018-10-04 RX ORDER — PHENYLEPHRINE HYDROCHLORIDE 10 MG/ML
INJECTION INTRAVENOUS
Status: DISCONTINUED | OUTPATIENT
Start: 2018-10-04 | End: 2018-10-05

## 2018-10-04 RX ORDER — FENTANYL CITRATE 50 UG/ML
INJECTION, SOLUTION INTRAMUSCULAR; INTRAVENOUS
Status: DISCONTINUED | OUTPATIENT
Start: 2018-10-04 | End: 2018-10-05

## 2018-10-04 RX ORDER — HYDROCODONE BITARTRATE AND ACETAMINOPHEN 500; 5 MG/1; MG/1
TABLET ORAL CONTINUOUS
Status: DISCONTINUED | OUTPATIENT
Start: 2018-10-04 | End: 2018-10-05

## 2018-10-04 RX ORDER — LIDOCAINE HCL/PF 100 MG/5ML
SYRINGE (ML) INTRAVENOUS
Status: DISCONTINUED | OUTPATIENT
Start: 2018-10-04 | End: 2018-10-05

## 2018-10-04 RX ORDER — MIDAZOLAM HYDROCHLORIDE 1 MG/ML
INJECTION INTRAMUSCULAR; INTRAVENOUS
Status: DISCONTINUED | OUTPATIENT
Start: 2018-10-04 | End: 2018-10-05

## 2018-10-04 RX ORDER — ROCURONIUM BROMIDE 10 MG/ML
INJECTION, SOLUTION INTRAVENOUS
Status: DISCONTINUED | OUTPATIENT
Start: 2018-10-04 | End: 2018-10-05

## 2018-10-04 RX ADMIN — ROCURONIUM BROMIDE 35 MG: 10 INJECTION, SOLUTION INTRAVENOUS at 10:10

## 2018-10-04 RX ADMIN — INSULIN DETEMIR 20 UNITS: 100 INJECTION, SOLUTION SUBCUTANEOUS at 08:10

## 2018-10-04 RX ADMIN — DOCUSATE SODIUM 100 MG: 100 CAPSULE, LIQUID FILLED ORAL at 08:10

## 2018-10-04 RX ADMIN — ROCURONIUM BROMIDE 5 MG: 10 INJECTION, SOLUTION INTRAVENOUS at 10:10

## 2018-10-04 RX ADMIN — MIDAZOLAM HYDROCHLORIDE 1 MG: 1 INJECTION, SOLUTION INTRAMUSCULAR; INTRAVENOUS at 10:10

## 2018-10-04 RX ADMIN — SODIUM CHLORIDE, SODIUM GLUCONATE, SODIUM ACETATE, POTASSIUM CHLORIDE, MAGNESIUM CHLORIDE, SODIUM PHOSPHATE, DIBASIC, AND POTASSIUM PHOSPHATE: .53; .5; .37; .037; .03; .012; .00082 INJECTION, SOLUTION INTRAVENOUS at 10:10

## 2018-10-04 RX ADMIN — RIFAXIMIN 550 MG: 550 TABLET ORAL at 08:10

## 2018-10-04 RX ADMIN — PHENYLEPHRINE HYDROCHLORIDE 100 MCG: 10 INJECTION INTRAVENOUS at 10:10

## 2018-10-04 RX ADMIN — HEPARIN SODIUM 5000 UNITS: 5000 INJECTION, SOLUTION INTRAVENOUS; SUBCUTANEOUS at 02:10

## 2018-10-04 RX ADMIN — ENTECAVIR 0.5 MG: 0.5 TABLET ORAL at 08:10

## 2018-10-04 RX ADMIN — FENTANYL CITRATE 100 MCG: 50 INJECTION, SOLUTION INTRAMUSCULAR; INTRAVENOUS at 10:10

## 2018-10-04 RX ADMIN — SODIUM CHLORIDE, SODIUM GLUCONATE, SODIUM ACETATE, POTASSIUM CHLORIDE, MAGNESIUM CHLORIDE, SODIUM PHOSPHATE, DIBASIC, AND POTASSIUM PHOSPHATE: .53; .5; .37; .037; .03; .012; .00082 INJECTION, SOLUTION INTRAVENOUS at 11:10

## 2018-10-04 RX ADMIN — SODIUM BICARBONATE 650 MG TABLET 1300 MG: at 08:10

## 2018-10-04 RX ADMIN — LIDOCAINE HYDROCHLORIDE 50 MG: 20 INJECTION, SOLUTION INTRAVENOUS at 10:10

## 2018-10-04 RX ADMIN — INSULIN ASPART 1 UNITS: 100 INJECTION, SOLUTION INTRAVENOUS; SUBCUTANEOUS at 08:10

## 2018-10-04 RX ADMIN — INSULIN ASPART 17 UNITS: 100 INJECTION, SOLUTION INTRAVENOUS; SUBCUTANEOUS at 08:10

## 2018-10-04 RX ADMIN — PHENYLEPHRINE HYDROCHLORIDE 200 MCG: 10 INJECTION INTRAVENOUS at 11:10

## 2018-10-04 RX ADMIN — HEPARIN SODIUM 5000 UNITS: 5000 INJECTION, SOLUTION INTRAVENOUS; SUBCUTANEOUS at 06:10

## 2018-10-04 RX ADMIN — DOCUSATE SODIUM 100 MG: 100 CAPSULE, LIQUID FILLED ORAL at 02:10

## 2018-10-04 RX ADMIN — INSULIN ASPART 3 UNITS: 100 INJECTION, SOLUTION INTRAVENOUS; SUBCUTANEOUS at 02:10

## 2018-10-04 RX ADMIN — VASOPRESSIN 2 UNITS/HR: 20 INJECTION INTRAVENOUS at 11:10

## 2018-10-04 RX ADMIN — MIDODRINE HYDROCHLORIDE 15 MG: 5 TABLET ORAL at 02:10

## 2018-10-04 RX ADMIN — SODIUM CHLORIDE 2 UNITS/HR: 9 INJECTION, SOLUTION INTRAVENOUS at 11:10

## 2018-10-04 RX ADMIN — MIDODRINE HYDROCHLORIDE 15 MG: 5 TABLET ORAL at 08:10

## 2018-10-04 RX ADMIN — INSULIN ASPART 2 UNITS: 100 INJECTION, SOLUTION INTRAVENOUS; SUBCUTANEOUS at 08:10

## 2018-10-04 RX ADMIN — SUCCINYLCHOLINE CHLORIDE 100 MG: 20 INJECTION, SOLUTION INTRAMUSCULAR; INTRAVENOUS at 10:10

## 2018-10-04 RX ADMIN — POTASSIUM BICARBONATE 50 MEQ: 25 TABLET, EFFERVESCENT ORAL at 09:10

## 2018-10-04 RX ADMIN — PROPOFOL 100 MG: 10 INJECTION, EMULSION INTRAVENOUS at 10:10

## 2018-10-04 RX ADMIN — MIDODRINE HYDROCHLORIDE 15 MG: 5 TABLET ORAL at 06:10

## 2018-10-04 RX ADMIN — LACTULOSE 30 G: 20 SOLUTION ORAL at 08:10

## 2018-10-04 RX ADMIN — FENTANYL CITRATE 150 MCG: 50 INJECTION, SOLUTION INTRAMUSCULAR; INTRAVENOUS at 11:10

## 2018-10-04 NOTE — PROGRESS NOTES
[facility]  Liver Transplant  Progress Note    Patient Name: Scarlett Reddy  MRN: [id number]  Admission Date: 10/1/2018  Hospital Length of Stay: 2 days  Code Status: Full Code  Primary Care Provider: Primary Doctor No    Subjective:     History of Present Illness:  Ms. Reddy is a 67 y/o female with PMH of ESLD secondary Hep B and D.  Listed for liver transplant with MELD 23.  Paracentesis 10/1 with 5L removed, no fluid sent for analysis.  Morning labs significant for hyponatremia, Na 119.  Pt admitted to LTS for sodium monitoring.    Hospital Course:  Interval history: No acute events overnight. Na has improved to 125. Continue 1 L fluid restriction. SBP 70-80s. BP improved to 90/60s with albumin 5%. Continue midodrine TID. MELD 24 today, Listed with MELD 25. Previously on internal hold for hyponatremia, but now active.  Received call that patient will be back up for liver transplant. Monitor.     Scheduled Meds:   docusate sodium  100 mg Oral TID    entecavir  0.5 mg Oral Daily    ergocalciferol  50,000 Units Oral Q7 Days    heparin (porcine)  5,000 Units Subcutaneous Q8H    insulin aspart U-100  17 Units Subcutaneous TIDWM    insulin detemir U-100  20 Units Subcutaneous QHS    lactulose  30 g Oral TID    lidocaine (PF) 10 mg/ml (1%)  1 mL Other Once    midodrine  15 mg Oral Q8H    potassium bicarbonate  50 mEq Oral Once    rifAXIMin  550 mg Oral BID    sodium bicarbonate  1,300 mg Oral TID     Continuous Infusions:  PRN Meds:dextrose 50%, dextrose 50%, glucagon (human recombinant), glucose, glucose, insulin aspart U-100, ondansetron, promethazine (PHENERGAN) IVPB, sodium chloride 0.9%    Review of Systems   Constitutional: Positive for activity change, appetite change and fatigue. Negative for chills, diaphoresis and fever.   HENT: Negative for congestion and facial swelling.    Eyes: Negative for pain, discharge and visual disturbance.   Respiratory: Negative for cough, chest  tightness, shortness of breath and wheezing.    Cardiovascular: Positive for leg swelling. Negative for chest pain and palpitations.   Gastrointestinal: Positive for abdominal distention. Negative for abdominal pain, constipation, diarrhea, nausea and vomiting.   Endocrine: Negative.    Genitourinary: Negative for decreased urine volume and dysuria.   Musculoskeletal: Negative for arthralgias and back pain.   Skin: Negative for wound.   Allergic/Immunologic: Negative for immunocompromised state.   Neurological: Positive for weakness. Negative for tremors.   Hematological: Negative.    Psychiatric/Behavioral: Negative for confusion. The patient is not nervous/anxious.      Objective:     Vital Signs (Most Recent):  Temp: 98 °F (36.7 °C) (10/02/18 1155)  Pulse: 82 (10/02/18 1200)  Resp: 13 (10/02/18 1200)  BP: 100/63 (10/02/18 1200)  SpO2: 96 % (10/02/18 1200) Vital Signs (24h Range):  Temp:  [98 °F (36.7 °C)-98.1 °F (36.7 °C)] 98 °F (36.7 °C)  Pulse:  [82-93] 82  Resp:  [11-25] 13  SpO2:  [96 %-100 %] 96 %  BP: ()/(45-63) 100/63     Weight: 55 kg (121 lb 4.1 oz)  Body mass index is 21.48 kg/m².    Intake/Output - Last 3 Shifts       09/30 0700 - 10/01 0659 10/01 0700 - 10/02 0659 10/02 0700 - 10/03 0659    P.O.  0 250    Total Intake(mL/kg)  0 (0) 250 (4.5)    Net  0 +250           Urine Occurrence  1 x     Stool Occurrence  0 x           Physical Exam   Constitutional: She is oriented to person, place, and time. She appears well-developed. No distress.   Temporal and distal extremity muscle wasting   HENT:   Head: Normocephalic and atraumatic.   Eyes: Pupils are equal, round, and reactive to light. No scleral icterus.   Neck: Normal range of motion. Neck supple.   Cardiovascular: Normal rate, regular rhythm and normal heart sounds.   Pulmonary/Chest: Effort normal and breath sounds normal. She has no rales.   Abdominal: Soft. Bowel sounds are normal. She exhibits distension (ascites). There is no tenderness.    Musculoskeletal: She exhibits edema.   Neurological: She is alert and oriented to person, place, and time.   Skin: Skin is warm and dry. She is not diaphoretic.   Psychiatric: She has a normal mood and affect. Her behavior is normal.   Nursing note and vitals reviewed.      Laboratory:  Immunosuppressants     None        CBC:   Recent Labs   Lab  10/02/18   0535   WBC  2.65*   RBC  2.51*   HGB  8.0*   HCT  22.5*   PLT  82*   MCV  90   MCH  31.9*   MCHC  35.6     CMP:   Recent Labs   Lab  10/02/18   0535   GLU  131*   CALCIUM  9.0   ALBUMIN  4.1   PROT  5.8*   NA  122*   K  3.2*   CO2  25   CL  83*   BUN  93*   CREATININE  1.0   ALKPHOS  275*   ALT  70*   AST  115*   BILITOT  2.0*     Labs within the past 24 hours have been reviewed.    Diagnostic Results:  I have personally reviewed all pertinent imaging studies.    Assessment/Plan:     * Hyponatremia    - Na 119 on admit.  - Na 125.  - continue 1 L fluid restriction and low sodium diet.  - take off hold for oltx             Ascites    - paracentesis 10/1, 5L removed, no fluid sent for analysis.  - Paracentesis ordered for Friday 10/5. Limit 4 L max.           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  - endocrine consulted.          Hypokalemia    - replace orally.   - Monitor labs daily           Hypotension    - SBP 78/46 manual.   - Albumin 5% 250 ml x 1 dose 10/3/18  - BP improved to 90/60s.  - continue  Midodrine 15 mg TID.           Severe malnutrition    - dietary consulted.          Liver transplant candidate    - currently on internal hold for hyponatremia.            Hepatic encephalopathy    - Grade 1.  - continue lactulose and rifaximin.                VTE Risk Mitigation (From admission, onward)        Ordered     heparin (porcine) injection 5,000 Units  Every 8 hours      10/01/18 1617     Place OLAF hose  Until discontinued      10/01/18 1617     Place sequential compression device  Until discontinued      10/01/18 1617     IP VTE LOW RISK PATIENT  Once      10/01/18 1617          The patients clinical status was discussed at multidisplinary rounds, involving transplant surgery, transplant medicine, pharmacy, nursing, nutrition, and social work    Discharge Planning: possible txp tonight, if no txp may d/c in am  No Patient Care Coordination Note on file.      Seble Tineo NP  Liver Transplant  [facility]

## 2018-10-04 NOTE — TELEPHONE ENCOUNTER
BACK-UP ORGAN OFFER NOTE    Notified by Hai Morales, , of backup liver offer with donor information.  Donor and recipient information read back and verified.  Spoke with Scarlett Reddywith assistance of interpreters Dunia and Katerin, and AGA Tineo NP, and identified acute changes in health status.  Per Hai Morales, labs reviewed with Dr. Theodore, informed sodium improved and ok to proceed with transplant.  NP and patient instructed NPO after noon. Patient verbalized understanding that she has been called as a backup.

## 2018-10-04 NOTE — PLAN OF CARE
Problem: Patient Care Overview  Goal: Plan of Care Review  -Pt free from fall or injury so far this shift. Instructed to call if assistance needed, verbalized understanding.   -BG checked AC/HS. Last , NPO so MTI held, 3 units SSI coverage given.   -Backup for LTX, NPO since noon.   -Na level  ^ 125, 1 L FR maintained.  -k 3.1, PO replacement given.  -Para (diagnostic & therapeutic) scheduled for Friday 10/5.    -Pt did not have a BM today. Colace continued. 1400 dose of Lactulose held.  -Denies pain or discomfort.   -Afebrile. Hand hygiene reinforced. Daily labs monitored.

## 2018-10-04 NOTE — PROGRESS NOTES
Evangelina LANDERS Sent a follow up request to Diane Rodríguez with [facility] to inquire about mental health services for the patient. SW remains available

## 2018-10-04 NOTE — PLAN OF CARE
Problem: Patient Care Overview  Goal: Plan of Care Review  Pt AAOx4, bed low locked, call light within reach, wearing nonskid socks. Pt instructed to use call light for assistance, pt verbalizes understanding.   Pt denies any pain or discomfort at this time. AC/HS, 118, no coverage needed. Pt remains free from injury/harm at this time. Afebrile. See flowsheet for full assessment/VS.

## 2018-10-05 PROBLEM — T38.0X5A ADVERSE EFFECT OF CORTICOSTEROIDS: Status: ACTIVE | Noted: 2018-10-05

## 2018-10-05 PROBLEM — Z29.89 PROPHYLACTIC IMMUNOTHERAPY: Status: ACTIVE | Noted: 2018-10-05

## 2018-10-05 PROBLEM — Z94.4 LIVER TRANSPLANTED: Status: ACTIVE | Noted: 2018-10-05

## 2018-10-05 LAB
ABO + RH BLD: NORMAL
ALBUMIN SERPL BCP-MCNC: 2.3 G/DL
ALBUMIN SERPL BCP-MCNC: 2.7 G/DL
ALBUMIN SERPL BCP-MCNC: 2.9 G/DL
ALBUMIN SERPL BCP-MCNC: 3.2 G/DL
ALBUMIN SERPL BCP-MCNC: 3.7 G/DL
ALLENS TEST: ABNORMAL
ALP SERPL-CCNC: 145 U/L
ALP SERPL-CCNC: 176 U/L
ALT SERPL W/O P-5'-P-CCNC: 2799 U/L
ALT SERPL W/O P-5'-P-CCNC: 3353 U/L
ALT SERPL W/O P-5'-P-CCNC: 640 U/L
AMYLASE SERPL-CCNC: 53 U/L
ANION GAP SERPL CALC-SCNC: 13 MMOL/L
ANION GAP SERPL CALC-SCNC: 9 MMOL/L
APPEARANCE FLD: NORMAL
APTT BLDCRRT: 27.4 SEC
APTT BLDCRRT: 32.9 SEC
APTT BLDCRRT: 49.6 SEC
APTT BLDCRRT: 58.5 SEC
AST SERPL-CCNC: 4894 U/L
AST SERPL-CCNC: 5541 U/L
AST SERPL-CCNC: 6138 U/L
AST SERPL-CCNC: 6709 U/L
AST SERPL-CCNC: 979 U/L
BACTERIA #/AREA URNS AUTO: ABNORMAL /HPF
BASOPHILS # BLD AUTO: 0 K/UL
BASOPHILS # BLD AUTO: 0.01 K/UL
BASOPHILS NFR BLD: 0 %
BASOPHILS NFR BLD: 0.1 %
BILIRUB SERPL-MCNC: 12.1 MG/DL
BILIRUB SERPL-MCNC: 4.5 MG/DL
BILIRUB UR QL STRIP: NEGATIVE
BLD GP AB SCN CELLS X3 SERPL QL: NORMAL
BLD PROD TYP BPU: NORMAL
BLOOD UNIT EXPIRATION DATE: NORMAL
BLOOD UNIT TYPE CODE: 5100
BLOOD UNIT TYPE: NORMAL
BODY FLD TYPE: NORMAL
BUN SERPL-MCNC: 21 MG/DL
BUN SERPL-MCNC: 34 MG/DL
CA-I BLDV-SCNC: 0.9 MMOL/L
CA-I BLDV-SCNC: 0.97 MMOL/L
CA-I BLDV-SCNC: 1.12 MMOL/L
CALCIUM SERPL-MCNC: 7.8 MG/DL
CALCIUM SERPL-MCNC: 8.2 MG/DL
CHLORIDE SERPL-SCNC: 103 MMOL/L
CHLORIDE SERPL-SCNC: 99 MMOL/L
CLARITY UR REFRACT.AUTO: CLEAR
CO2 SERPL-SCNC: 20 MMOL/L
CO2 SERPL-SCNC: 22 MMOL/L
CODING SYSTEM: NORMAL
COLOR FLD: NORMAL
COLOR UR AUTO: YELLOW
CREAT SERPL-MCNC: 0.6 MG/DL
CREAT SERPL-MCNC: 0.8 MG/DL
DELSYS: ABNORMAL
DIFFERENTIAL METHOD: ABNORMAL
DISPENSE STATUS: NORMAL
EOSINOPHIL # BLD AUTO: 0 K/UL
EOSINOPHIL NFR BLD: 0 %
ERYTHROCYTE [DISTWIDTH] IN BLOOD BY AUTOMATED COUNT: 14.9 %
ERYTHROCYTE [DISTWIDTH] IN BLOOD BY AUTOMATED COUNT: 15.1 %
ERYTHROCYTE [DISTWIDTH] IN BLOOD BY AUTOMATED COUNT: 15.5 %
ERYTHROCYTE [DISTWIDTH] IN BLOOD BY AUTOMATED COUNT: 15.6 %
ERYTHROCYTE [SEDIMENTATION RATE] IN BLOOD BY WESTERGREN METHOD: 14 MM/H
EST. GFR  (AFRICAN AMERICAN): >60 ML/MIN/1.73 M^2
EST. GFR  (AFRICAN AMERICAN): >60 ML/MIN/1.73 M^2
EST. GFR  (NON AFRICAN AMERICAN): >60 ML/MIN/1.73 M^2
EST. GFR  (NON AFRICAN AMERICAN): >60 ML/MIN/1.73 M^2
FIBRINOGEN PPP-MCNC: 108 MG/DL
FIBRINOGEN PPP-MCNC: 184 MG/DL
FIBRINOGEN PPP-MCNC: 229 MG/DL
FIO2: 100
FIO2: 21
FIO2: 40
FIO2: 50
FLOW: 60
GGT SERPL-CCNC: 200 U/L
GLUCOSE SERPL-MCNC: 128 MG/DL
GLUCOSE SERPL-MCNC: 131 MG/DL
GLUCOSE SERPL-MCNC: 178 MG/DL
GLUCOSE SERPL-MCNC: 187 MG/DL
GLUCOSE SERPL-MCNC: 216 MG/DL
GLUCOSE UR QL STRIP: NEGATIVE
GRAM STN SPEC: NORMAL
GRAM STN SPEC: NORMAL
HCO3 UR-SCNC: 22.2 MMOL/L (ref 24–28)
HCO3 UR-SCNC: 22.7 MMOL/L (ref 24–28)
HCO3 UR-SCNC: 23.6 MMOL/L (ref 24–28)
HCO3 UR-SCNC: 25.8 MMOL/L (ref 24–28)
HCT VFR BLD AUTO: 24.6 %
HCT VFR BLD AUTO: 24.8 %
HCT VFR BLD AUTO: 26.4 %
HCT VFR BLD AUTO: 28.1 %
HCT VFR BLD AUTO: 28.6 %
HCT VFR BLD AUTO: 29.4 %
HCT VFR BLD AUTO: 30.7 %
HCT VFR BLD CALC: 29 %PCV (ref 36–54)
HGB BLD-MCNC: 10.1 G/DL
HGB BLD-MCNC: 10.4 G/DL
HGB BLD-MCNC: 8.4 G/DL
HGB BLD-MCNC: 8.4 G/DL
HGB BLD-MCNC: 9.2 G/DL
HGB BLD-MCNC: 9.8 G/DL
HGB BLD-MCNC: 9.9 G/DL
HGB UR QL STRIP: NEGATIVE
IMM GRANULOCYTES # BLD AUTO: 0.02 K/UL
IMM GRANULOCYTES # BLD AUTO: 0.03 K/UL
IMM GRANULOCYTES # BLD AUTO: 0.03 K/UL
IMM GRANULOCYTES # BLD AUTO: 0.04 K/UL
IMM GRANULOCYTES NFR BLD AUTO: 0.3 %
IMM GRANULOCYTES NFR BLD AUTO: 0.4 %
IMM GRANULOCYTES NFR BLD AUTO: 0.5 %
IMM GRANULOCYTES NFR BLD AUTO: 0.5 %
INR PPP: 1.2
INR PPP: 1.4
INR PPP: 1.7
INR PPP: 1.9
INR PPP: 2
INR PPP: 2.2
KETONES UR QL STRIP: NEGATIVE
LDH SERPL L TO P-CCNC: 8347 U/L
LEUKOCYTE ESTERASE UR QL STRIP: ABNORMAL
LYMPHOCYTES # BLD AUTO: 0 K/UL
LYMPHOCYTES # BLD AUTO: 0.1 K/UL
LYMPHOCYTES NFR BLD: 0.5 %
LYMPHOCYTES NFR BLD: 1.1 %
LYMPHOCYTES NFR BLD: 1.2 %
LYMPHOCYTES NFR BLD: 1.4 %
LYMPHOCYTES NFR FLD MANUAL: 88 %
MAGNESIUM SERPL-MCNC: 1.9 MG/DL
MAGNESIUM SERPL-MCNC: 2 MG/DL
MAGNESIUM SERPL-MCNC: 2.3 MG/DL
MCH RBC QN AUTO: 30.8 PG
MCH RBC QN AUTO: 30.9 PG
MCH RBC QN AUTO: 30.9 PG
MCH RBC QN AUTO: 31.2 PG
MCHC RBC AUTO-ENTMCNC: 33.9 G/DL
MCHC RBC AUTO-ENTMCNC: 34.3 G/DL
MCHC RBC AUTO-ENTMCNC: 34.4 G/DL
MCHC RBC AUTO-ENTMCNC: 34.8 G/DL
MCV RBC AUTO: 90 FL
MCV RBC AUTO: 91 FL
MICROSCOPIC COMMENT: ABNORMAL
MIN VOL: 5.5
MIN VOL: 6.8
MODE: ABNORMAL
MONOCYTES # BLD AUTO: 0.2 K/UL
MONOCYTES # BLD AUTO: 0.6 K/UL
MONOCYTES # BLD AUTO: 0.7 K/UL
MONOCYTES # BLD AUTO: 0.8 K/UL
MONOCYTES NFR BLD: 10.6 %
MONOCYTES NFR BLD: 3.5 %
MONOCYTES NFR BLD: 8.5 %
MONOCYTES NFR BLD: 8.9 %
MONOS+MACROS NFR FLD MANUAL: 2 %
NEUTROPHILS # BLD AUTO: 5.5 K/UL
NEUTROPHILS # BLD AUTO: 5.6 K/UL
NEUTROPHILS # BLD AUTO: 5.8 K/UL
NEUTROPHILS # BLD AUTO: 8.1 K/UL
NEUTROPHILS NFR BLD: 87.5 %
NEUTROPHILS NFR BLD: 89.5 %
NEUTROPHILS NFR BLD: 90.5 %
NEUTROPHILS NFR BLD: 95 %
NEUTROPHILS NFR FLD MANUAL: 10 %
NITRITE UR QL STRIP: NEGATIVE
NON-SQ EPI CELLS #/AREA URNS AUTO: 2 /HPF
NRBC BLD-RTO: 0 /100 WBC
PCO2 BLDA: 36.9 MMHG (ref 35–45)
PCO2 BLDA: 37.1 MMHG (ref 35–45)
PCO2 BLDA: 39.1 MMHG (ref 35–45)
PCO2 BLDA: 49.3 MMHG (ref 35–45)
PEEP: 5
PH SMN: 7.33 [PH] (ref 7.35–7.45)
PH SMN: 7.39 [PH] (ref 7.35–7.45)
PH UR STRIP: 5 [PH] (ref 5–8)
PHOSPHATE SERPL-MCNC: 2.4 MG/DL
PHOSPHATE SERPL-MCNC: 4.2 MG/DL
PLATELET # BLD AUTO: 103 K/UL
PLATELET # BLD AUTO: 35 K/UL
PLATELET # BLD AUTO: 38 K/UL
PLATELET # BLD AUTO: 51 K/UL
PLATELET # BLD AUTO: 62 K/UL
PLATELET # BLD AUTO: 68 K/UL
PLATELET # BLD AUTO: 73 K/UL
PLATELET # BLD AUTO: 84 K/UL
PLATELET BLD QL SMEAR: ABNORMAL
PMV BLD AUTO: 10.6 FL
PMV BLD AUTO: 10.7 FL
PMV BLD AUTO: 10.8 FL
PMV BLD AUTO: 11.2 FL
PMV BLD AUTO: 11.6 FL
PMV BLD AUTO: 11.7 FL
PO2 BLDA: 173 MMHG (ref 80–100)
PO2 BLDA: 188 MMHG (ref 80–100)
PO2 BLDA: 340 MMHG (ref 80–100)
PO2 BLDA: 66 MMHG (ref 80–100)
POC BE: -1 MMOL/L
POC BE: -2 MMOL/L
POC BE: -3 MMOL/L
POC BE: 0 MMOL/L
POC IONIZED CALCIUM: 1.03 MMOL/L (ref 1.06–1.42)
POC SATURATED O2: 100 % (ref 95–100)
POC SATURATED O2: 93 % (ref 95–100)
POC TCO2: 23 MMOL/L (ref 23–27)
POC TCO2: 24 MMOL/L (ref 23–27)
POC TCO2: 25 MMOL/L (ref 23–27)
POC TCO2: 27 MMOL/L (ref 23–27)
POCT GLUCOSE: 129 MG/DL (ref 70–110)
POCT GLUCOSE: 158 MG/DL (ref 70–110)
POCT GLUCOSE: 172 MG/DL (ref 70–110)
POCT GLUCOSE: 190 MG/DL (ref 70–110)
POCT GLUCOSE: 192 MG/DL (ref 70–110)
POCT GLUCOSE: 218 MG/DL (ref 70–110)
POCT GLUCOSE: 219 MG/DL (ref 70–110)
POCT GLUCOSE: 233 MG/DL (ref 70–110)
POCT GLUCOSE: 271 MG/DL (ref 70–110)
POCT GLUCOSE: 300 MG/DL (ref 70–110)
POCT GLUCOSE: 316 MG/DL (ref 70–110)
POCT GLUCOSE: 325 MG/DL (ref 70–110)
POCT GLUCOSE: 346 MG/DL (ref 70–110)
POCT GLUCOSE: 352 MG/DL (ref 70–110)
POCT GLUCOSE: 382 MG/DL (ref 70–110)
POCT GLUCOSE: 389 MG/DL (ref 70–110)
POOLED CRYOPPT GVN BPU: NORMAL
POTASSIUM BLD-SCNC: 4.4 MMOL/L (ref 3.5–5.1)
POTASSIUM SERPL-SCNC: 3 MMOL/L
POTASSIUM SERPL-SCNC: 4 MMOL/L
POTASSIUM SERPL-SCNC: 4.4 MMOL/L
PROT SERPL-MCNC: 4.4 G/DL
PROT SERPL-MCNC: 4.4 G/DL
PROT UR QL STRIP: NEGATIVE
PROTHROMBIN TIME: 11.9 SEC
PROTHROMBIN TIME: 13.9 SEC
PROTHROMBIN TIME: 16.8 SEC
PROTHROMBIN TIME: 18.8 SEC
PROTHROMBIN TIME: 19.6 SEC
PROTHROMBIN TIME: 19.6 SEC
PROTHROMBIN TIME: 19.7 SEC
PROTHROMBIN TIME: 21.1 SEC
PS: 10
PS: 10
PS: 5
RBC # BLD AUTO: 2.95 M/UL
RBC # BLD AUTO: 3.18 M/UL
RBC # BLD AUTO: 3.27 M/UL
RBC # BLD AUTO: 3.37 M/UL
SAMPLE: ABNORMAL
SITE: ABNORMAL
SODIUM BLD-SCNC: 132 MMOL/L (ref 136–145)
SODIUM SERPL-SCNC: 128 MMOL/L
SODIUM SERPL-SCNC: 131 MMOL/L
SODIUM SERPL-SCNC: 132 MMOL/L
SODIUM SERPL-SCNC: 132 MMOL/L
SODIUM SERPL-SCNC: 134 MMOL/L
SP GR UR STRIP: 1.01 (ref 1–1.03)
SP02: 100
SP02: 99
SPONT RATE: 12
SPONT RATE: 12
SQUAMOUS #/AREA URNS AUTO: 1 /HPF
URN SPEC COLLECT METH UR: ABNORMAL
UROBILINOGEN UR STRIP-ACNC: NEGATIVE EU/DL
VT: 350
WBC # BLD AUTO: 5.79 K/UL
WBC # BLD AUTO: 6.39 K/UL
WBC # BLD AUTO: 6.45 K/UL
WBC # BLD AUTO: 9.1 K/UL
WBC # FLD: 222 /CU MM
WBC #/AREA URNS AUTO: 2 /HPF (ref 0–5)

## 2018-10-05 PROCEDURE — 0FY00Z0 TRANSPLANTATION OF LIVER, ALLOGENEIC, OPEN APPROACH: ICD-10-PCS | Performed by: SURGERY

## 2018-10-05 PROCEDURE — 85610 PROTHROMBIN TIME: CPT | Mod: 91

## 2018-10-05 PROCEDURE — 47135 TRANSPLANTATION OF LIVER: CPT | Mod: 82,,, | Performed by: TRANSPLANT SURGERY

## 2018-10-05 PROCEDURE — 20000000 HC ICU ROOM

## 2018-10-05 PROCEDURE — 82330 ASSAY OF CALCIUM: CPT | Mod: 91

## 2018-10-05 PROCEDURE — 84450 TRANSFERASE (AST) (SGOT): CPT

## 2018-10-05 PROCEDURE — 47135 TRANSPLANTATION OF LIVER: CPT | Mod: ,,, | Performed by: SURGERY

## 2018-10-05 PROCEDURE — 81300000 HC LIVER ACQUISITION CHARGE

## 2018-10-05 PROCEDURE — 25000003 PHARM REV CODE 250: Performed by: NURSE ANESTHETIST, CERTIFIED REGISTERED

## 2018-10-05 PROCEDURE — P9021 RED BLOOD CELLS UNIT: HCPCS

## 2018-10-05 PROCEDURE — 63600175 PHARM REV CODE 636 W HCPCS: Performed by: NURSE PRACTITIONER

## 2018-10-05 PROCEDURE — 80053 COMPREHEN METABOLIC PANEL: CPT | Mod: 91

## 2018-10-05 PROCEDURE — 87070 CULTURE OTHR SPECIMN AEROBIC: CPT

## 2018-10-05 PROCEDURE — 25000003 PHARM REV CODE 250: Performed by: SURGERY

## 2018-10-05 PROCEDURE — 80053 COMPREHEN METABOLIC PANEL: CPT

## 2018-10-05 PROCEDURE — 80100014 HC HEMODIALYSIS 1:1: Mod: NTX

## 2018-10-05 PROCEDURE — 99900017 HC EXTUBATION W/PARAMETERS (STAT)

## 2018-10-05 PROCEDURE — 85730 THROMBOPLASTIN TIME PARTIAL: CPT

## 2018-10-05 PROCEDURE — 25000003 PHARM REV CODE 250: Performed by: NURSE PRACTITIONER

## 2018-10-05 PROCEDURE — P9012 CRYOPRECIPITATE EACH UNIT: HCPCS

## 2018-10-05 PROCEDURE — 85014 HEMATOCRIT: CPT

## 2018-10-05 PROCEDURE — 27000221 HC OXYGEN, UP TO 24 HOURS

## 2018-10-05 PROCEDURE — P9017 PLASMA 1 DONOR FRZ W/IN 8 HR: HCPCS

## 2018-10-05 PROCEDURE — 88304 TISSUE EXAM BY PATHOLOGIST: CPT | Mod: 26,,, | Performed by: PATHOLOGY

## 2018-10-05 PROCEDURE — 85049 AUTOMATED PLATELET COUNT: CPT

## 2018-10-05 PROCEDURE — 63600175 PHARM REV CODE 636 W HCPCS: Performed by: SURGERY

## 2018-10-05 PROCEDURE — 99900035 HC TECH TIME PER 15 MIN (STAT)

## 2018-10-05 PROCEDURE — 94761 N-INVAS EAR/PLS OXIMETRY MLT: CPT

## 2018-10-05 PROCEDURE — 63600175 PHARM REV CODE 636 W HCPCS: Mod: JG | Performed by: SURGERY

## 2018-10-05 PROCEDURE — 63600175 PHARM REV CODE 636 W HCPCS: Mod: NTX | Performed by: STUDENT IN AN ORGANIZED HEALTH CARE EDUCATION/TRAINING PROGRAM

## 2018-10-05 PROCEDURE — 88313 SPECIAL STAINS GROUP 2: CPT | Mod: 26,,, | Performed by: PATHOLOGY

## 2018-10-05 PROCEDURE — P9045 ALBUMIN (HUMAN), 5%, 250 ML: HCPCS | Mod: JG | Performed by: SURGERY

## 2018-10-05 PROCEDURE — 85025 COMPLETE CBC W/AUTO DIFF WBC: CPT | Mod: 91

## 2018-10-05 PROCEDURE — 94002 VENT MGMT INPAT INIT DAY: CPT

## 2018-10-05 PROCEDURE — 86965 POOLING BLOOD PLATELETS: CPT

## 2018-10-05 PROCEDURE — 63600175 PHARM REV CODE 636 W HCPCS: Mod: JG

## 2018-10-05 PROCEDURE — 47147 PREP DONOR LIVER/ARTERIAL: CPT | Mod: 51,,, | Performed by: TRANSPLANT SURGERY

## 2018-10-05 PROCEDURE — 84460 ALANINE AMINO (ALT) (SGPT): CPT

## 2018-10-05 PROCEDURE — 63600175 PHARM REV CODE 636 W HCPCS: Performed by: NURSE ANESTHETIST, CERTIFIED REGISTERED

## 2018-10-05 PROCEDURE — 84132 ASSAY OF SERUM POTASSIUM: CPT

## 2018-10-05 PROCEDURE — 81300030 HC LIVER TRANSPORT, FLIGHT WITHIN 300 MILES

## 2018-10-05 PROCEDURE — 36415 COLL VENOUS BLD VENIPUNCTURE: CPT

## 2018-10-05 PROCEDURE — S0028 INJECTION, FAMOTIDINE, 20 MG: HCPCS | Performed by: SURGERY

## 2018-10-05 PROCEDURE — S5010 5% DEXTROSE AND 0.45% SALINE: HCPCS | Performed by: SURGERY

## 2018-10-05 PROCEDURE — 83735 ASSAY OF MAGNESIUM: CPT | Mod: 91

## 2018-10-05 PROCEDURE — 82330 ASSAY OF CALCIUM: CPT

## 2018-10-05 PROCEDURE — 88307 TISSUE EXAM BY PATHOLOGIST: CPT | Mod: 26,,, | Performed by: PATHOLOGY

## 2018-10-05 PROCEDURE — 82947 ASSAY GLUCOSE BLOOD QUANT: CPT | Mod: 91

## 2018-10-05 PROCEDURE — 88307 TISSUE EXAM BY PATHOLOGIST: CPT | Performed by: PATHOLOGY

## 2018-10-05 PROCEDURE — 25000003 PHARM REV CODE 250: Performed by: STUDENT IN AN ORGANIZED HEALTH CARE EDUCATION/TRAINING PROGRAM

## 2018-10-05 PROCEDURE — 85384 FIBRINOGEN ACTIVITY: CPT | Mod: 91

## 2018-10-05 PROCEDURE — P9045 ALBUMIN (HUMAN), 5%, 250 ML: HCPCS | Mod: JG | Performed by: NURSE ANESTHETIST, CERTIFIED REGISTERED

## 2018-10-05 PROCEDURE — 89051 BODY FLUID CELL COUNT: CPT

## 2018-10-05 PROCEDURE — 27000174 HC ADDITIONAL TIME, PER HR

## 2018-10-05 PROCEDURE — 82040 ASSAY OF SERUM ALBUMIN: CPT | Mod: 91

## 2018-10-05 PROCEDURE — 82306 VITAMIN D 25 HYDROXY: CPT

## 2018-10-05 PROCEDURE — 99233 SBSQ HOSP IP/OBS HIGH 50: CPT | Mod: ,,, | Performed by: NURSE PRACTITIONER

## 2018-10-05 PROCEDURE — 94150 VITAL CAPACITY TEST: CPT

## 2018-10-05 PROCEDURE — 84295 ASSAY OF SERUM SODIUM: CPT | Mod: 91

## 2018-10-05 PROCEDURE — 88309 TISSUE EXAM BY PATHOLOGIST: CPT | Mod: 26,,, | Performed by: PATHOLOGY

## 2018-10-05 PROCEDURE — 84100 ASSAY OF PHOSPHORUS: CPT

## 2018-10-05 PROCEDURE — 82803 BLOOD GASES ANY COMBINATION: CPT

## 2018-10-05 PROCEDURE — 94770 HC EXHALED C02 TEST: CPT

## 2018-10-05 PROCEDURE — 83615 LACTATE (LD) (LDH) ENZYME: CPT

## 2018-10-05 PROCEDURE — 99223 1ST HOSP IP/OBS HIGH 75: CPT | Mod: 24,,, | Performed by: SURGERY

## 2018-10-05 PROCEDURE — P9045 ALBUMIN (HUMAN), 5%, 250 ML: HCPCS | Mod: JG

## 2018-10-05 PROCEDURE — 84295 ASSAY OF SERUM SODIUM: CPT

## 2018-10-05 PROCEDURE — 84132 ASSAY OF SERUM POTASSIUM: CPT | Mod: 91

## 2018-10-05 PROCEDURE — 37799 UNLISTED PX VASCULAR SURGERY: CPT

## 2018-10-05 PROCEDURE — 87102 FUNGUS ISOLATION CULTURE: CPT

## 2018-10-05 PROCEDURE — 88313 SPECIAL STAINS GROUP 2: CPT | Performed by: PATHOLOGY

## 2018-10-05 PROCEDURE — 82977 ASSAY OF GGT: CPT

## 2018-10-05 PROCEDURE — 85730 THROMBOPLASTIN TIME PARTIAL: CPT | Mod: 91

## 2018-10-05 PROCEDURE — 63600175 PHARM REV CODE 636 W HCPCS: Performed by: STUDENT IN AN ORGANIZED HEALTH CARE EDUCATION/TRAINING PROGRAM

## 2018-10-05 PROCEDURE — 85018 HEMOGLOBIN: CPT | Mod: 91

## 2018-10-05 PROCEDURE — 87075 CULTR BACTERIA EXCEPT BLOOD: CPT

## 2018-10-05 PROCEDURE — 63600175 PHARM REV CODE 636 W HCPCS: Mod: JG | Performed by: NURSE ANESTHETIST, CERTIFIED REGISTERED

## 2018-10-05 PROCEDURE — 82150 ASSAY OF AMYLASE: CPT

## 2018-10-05 PROCEDURE — 87205 SMEAR GRAM STAIN: CPT

## 2018-10-05 RX ORDER — MAGNESIUM SULFATE HEPTAHYDRATE 40 MG/ML
2 INJECTION, SOLUTION INTRAVENOUS
Status: DISCONTINUED | OUTPATIENT
Start: 2018-10-05 | End: 2018-10-08

## 2018-10-05 RX ORDER — ALBUMIN HUMAN 50 G/1000ML
SOLUTION INTRAVENOUS CONTINUOUS PRN
Status: COMPLETED | OUTPATIENT
Start: 2018-10-05 | End: 2018-10-05

## 2018-10-05 RX ORDER — NYSTATIN 100000 [USP'U]/ML
500000 SUSPENSION ORAL
Qty: 315 ML | Refills: 0 | Status: SHIPPED | OUTPATIENT
Start: 2018-10-05 | End: 2018-12-20 | Stop reason: HOSPADM

## 2018-10-05 RX ORDER — FAMOTIDINE 20 MG/1
20 TABLET, FILM COATED ORAL DAILY
Qty: 30 TABLET | Refills: 11 | Status: SHIPPED | OUTPATIENT
Start: 2018-10-05 | End: 2018-12-20 | Stop reason: HOSPADM

## 2018-10-05 RX ORDER — METHYLPREDNISOLONE SOD SUCC 125 MG
80 VIAL (EA) INJECTION EVERY 12 HOURS
Status: COMPLETED | OUTPATIENT
Start: 2018-10-07 | End: 2018-10-07

## 2018-10-05 RX ORDER — SULFAMETHOXAZOLE AND TRIMETHOPRIM 400; 80 MG/1; MG/1
1 TABLET ORAL DAILY
Qty: 30 TABLET | Refills: 5 | Status: SHIPPED | OUTPATIENT
Start: 2018-10-05 | End: 2018-12-20 | Stop reason: HOSPADM

## 2018-10-05 RX ORDER — HEPARIN SODIUM 1000 [USP'U]/ML
INJECTION, SOLUTION INTRAVENOUS; SUBCUTANEOUS
Status: DISCONTINUED | OUTPATIENT
Start: 2018-10-05 | End: 2018-10-05

## 2018-10-05 RX ORDER — VALGANCICLOVIR 450 MG/1
450 TABLET, FILM COATED ORAL DAILY
Qty: 30 TABLET | Refills: 2 | Status: SHIPPED | OUTPATIENT
Start: 2018-10-05 | End: 2018-12-20 | Stop reason: SDUPTHER

## 2018-10-05 RX ORDER — METHYLPREDNISOLONE SOD SUCC 125 MG
100 VIAL (EA) INJECTION EVERY 12 HOURS
Status: COMPLETED | OUTPATIENT
Start: 2018-10-06 | End: 2018-10-06

## 2018-10-05 RX ORDER — DEXTROSE MONOHYDRATE AND SODIUM CHLORIDE 5; .45 G/100ML; G/100ML
INJECTION, SOLUTION INTRAVENOUS CONTINUOUS
Status: DISCONTINUED | OUTPATIENT
Start: 2018-10-05 | End: 2018-10-06

## 2018-10-05 RX ORDER — ERGOCALCIFEROL 1.25 MG/1
50000 CAPSULE ORAL
Qty: 4 CAPSULE | Refills: 5 | Status: SHIPPED | OUTPATIENT
Start: 2018-10-05

## 2018-10-05 RX ORDER — POTASSIUM CHLORIDE 14.9 MG/ML
60 INJECTION INTRAVENOUS
Status: DISCONTINUED | OUTPATIENT
Start: 2018-10-05 | End: 2018-10-08

## 2018-10-05 RX ORDER — ALBUMIN HUMAN 50 G/1000ML
SOLUTION INTRAVENOUS CONTINUOUS PRN
Status: DISCONTINUED | OUTPATIENT
Start: 2018-10-05 | End: 2018-10-05

## 2018-10-05 RX ORDER — FAMOTIDINE 10 MG/ML
20 INJECTION INTRAVENOUS EVERY 12 HOURS
Status: DISCONTINUED | OUTPATIENT
Start: 2018-10-05 | End: 2018-10-08

## 2018-10-05 RX ORDER — POTASSIUM CHLORIDE 29.8 MG/ML
40 INJECTION INTRAVENOUS
Status: DISCONTINUED | OUTPATIENT
Start: 2018-10-05 | End: 2018-10-08

## 2018-10-05 RX ORDER — MYCOPHENOLATE MOFETIL 200 MG/ML
1000 POWDER, FOR SUSPENSION ORAL 2 TIMES DAILY
Status: DISCONTINUED | OUTPATIENT
Start: 2018-10-05 | End: 2018-10-08

## 2018-10-05 RX ORDER — ALBUMIN HUMAN 50 G/1000ML
25 SOLUTION INTRAVENOUS ONCE
Status: COMPLETED | OUTPATIENT
Start: 2018-10-05 | End: 2018-10-05

## 2018-10-05 RX ORDER — NYSTATIN 100000 [USP'U]/ML
500000 SUSPENSION ORAL
Status: DISCONTINUED | OUTPATIENT
Start: 2018-10-05 | End: 2018-10-06

## 2018-10-05 RX ORDER — FUROSEMIDE 10 MG/ML
INJECTION INTRAMUSCULAR; INTRAVENOUS
Status: DISCONTINUED | OUTPATIENT
Start: 2018-10-05 | End: 2018-10-05

## 2018-10-05 RX ORDER — MAGNESIUM SULFATE HEPTAHYDRATE 40 MG/ML
4 INJECTION, SOLUTION INTRAVENOUS
Status: DISCONTINUED | OUTPATIENT
Start: 2018-10-05 | End: 2018-10-08

## 2018-10-05 RX ORDER — METHYLPREDNISOLONE SOD SUCC 125 MG
60 VIAL (EA) INJECTION EVERY 12 HOURS
Status: COMPLETED | OUTPATIENT
Start: 2018-10-08 | End: 2018-10-08

## 2018-10-05 RX ORDER — PREDNISONE 10 MG/1
20 TABLET ORAL DAILY
Status: DISCONTINUED | OUTPATIENT
Start: 2018-10-11 | End: 2018-10-10

## 2018-10-05 RX ORDER — TACROLIMUS 1 MG/1
6 CAPSULE ORAL EVERY 12 HOURS
Qty: 360 CAPSULE | Refills: 11 | Status: SHIPPED | OUTPATIENT
Start: 2018-10-05 | End: 2018-12-21 | Stop reason: SDUPTHER

## 2018-10-05 RX ORDER — LIDOCAINE HYDROCHLORIDE 40 MG/ML
INJECTION, SOLUTION RETROBULBAR
Status: DISCONTINUED | OUTPATIENT
Start: 2018-10-05 | End: 2018-10-05 | Stop reason: HOSPADM

## 2018-10-05 RX ORDER — NALOXONE HCL 0.4 MG/ML
0.02 VIAL (ML) INJECTION
Status: DISCONTINUED | OUTPATIENT
Start: 2018-10-05 | End: 2018-10-08

## 2018-10-05 RX ORDER — ONDANSETRON 2 MG/ML
INJECTION INTRAMUSCULAR; INTRAVENOUS
Status: DISCONTINUED | OUTPATIENT
Start: 2018-10-05 | End: 2018-10-05

## 2018-10-05 RX ORDER — HYDROMORPHONE HCL IN 0.9% NACL 6 MG/30 ML
PATIENT CONTROLLED ANALGESIA SYRINGE INTRAVENOUS CONTINUOUS
Status: DISCONTINUED | OUTPATIENT
Start: 2018-10-05 | End: 2018-10-08

## 2018-10-05 RX ORDER — HEPARIN SODIUM 1000 [USP'U]/ML
INJECTION, SOLUTION INTRAVENOUS; SUBCUTANEOUS
Status: DISCONTINUED | OUTPATIENT
Start: 2018-10-05 | End: 2018-10-05 | Stop reason: HOSPADM

## 2018-10-05 RX ORDER — ALBUMIN HUMAN 50 G/1000ML
SOLUTION INTRAVENOUS
Status: COMPLETED
Start: 2018-10-05 | End: 2018-10-05

## 2018-10-05 RX ORDER — POTASSIUM CHLORIDE 29.8 MG/ML
80 INJECTION INTRAVENOUS
Status: DISCONTINUED | OUTPATIENT
Start: 2018-10-05 | End: 2018-10-08

## 2018-10-05 RX ORDER — VALGANCICLOVIR HYDROCHLORIDE 50 MG/ML
450 POWDER, FOR SOLUTION ORAL EVERY MORNING
Status: DISCONTINUED | OUTPATIENT
Start: 2018-10-15 | End: 2018-10-11

## 2018-10-05 RX ORDER — PREDNISONE 10 MG/1
TABLET ORAL
Qty: 60 TABLET | Refills: 0 | Status: SHIPPED | OUTPATIENT
Start: 2018-10-05 | End: 2018-12-20 | Stop reason: HOSPADM

## 2018-10-05 RX ORDER — SULFAMETHOXAZOLE AND TRIMETHOPRIM 400; 80 MG/1; MG/1
1 TABLET ORAL EVERY MORNING
Status: DISCONTINUED | OUTPATIENT
Start: 2018-10-05 | End: 2018-10-08

## 2018-10-05 RX ORDER — MYCOPHENOLATE MOFETIL 250 MG/1
1000 CAPSULE ORAL 2 TIMES DAILY
Qty: 240 CAPSULE | Refills: 2 | Status: SHIPPED | OUTPATIENT
Start: 2018-10-05 | End: 2018-12-20 | Stop reason: HOSPADM

## 2018-10-05 RX ORDER — MANNITOL 250 MG/ML
INJECTION, SOLUTION INTRAVENOUS
Status: DISCONTINUED | OUTPATIENT
Start: 2018-10-05 | End: 2018-10-05

## 2018-10-05 RX ORDER — ALBUMIN HUMAN 50 G/1000ML
12.5 SOLUTION INTRAVENOUS ONCE
Status: COMPLETED | OUTPATIENT
Start: 2018-10-05 | End: 2018-10-05

## 2018-10-05 RX ORDER — ENTECAVIR 0.5 MG/1
0.5 TABLET, FILM COATED ORAL DAILY
Qty: 30 TABLET | Refills: 3 | Status: SHIPPED | OUTPATIENT
Start: 2018-10-05

## 2018-10-05 RX ADMIN — Medication 1000 MG: at 09:10

## 2018-10-05 RX ADMIN — PHENYLEPHRINE HYDROCHLORIDE 200 MCG: 10 INJECTION INTRAVENOUS at 02:10

## 2018-10-05 RX ADMIN — ALBUMIN HUMAN 12.5 G: 50 SOLUTION INTRAVENOUS at 09:10

## 2018-10-05 RX ADMIN — FENTANYL CITRATE 150 MCG: 50 INJECTION, SOLUTION INTRAMUSCULAR; INTRAVENOUS at 05:10

## 2018-10-05 RX ADMIN — FUROSEMIDE 50 MG: 10 INJECTION, SOLUTION INTRAMUSCULAR; INTRAVENOUS at 12:10

## 2018-10-05 RX ADMIN — SODIUM CHLORIDE 3 G: 9 INJECTION, SOLUTION INTRAVENOUS at 11:10

## 2018-10-05 RX ADMIN — INSULIN HUMAN 10 UNITS: 100 INJECTION, SOLUTION PARENTERAL at 11:10

## 2018-10-05 RX ADMIN — HEPARIN SODIUM 5000 UNITS: 5000 INJECTION, SOLUTION INTRAVENOUS; SUBCUTANEOUS at 03:10

## 2018-10-05 RX ADMIN — FAMOTIDINE 20 MG: 10 INJECTION, SOLUTION INTRAVENOUS at 09:10

## 2018-10-05 RX ADMIN — NYSTATIN 500000 UNITS: 500000 SUSPENSION ORAL at 09:10

## 2018-10-05 RX ADMIN — INSULIN HUMAN 30 UNITS: 100 INJECTION, SOLUTION PARENTERAL at 01:10

## 2018-10-05 RX ADMIN — SODIUM CHLORIDE 0.25 MCG/KG/MIN: 9 INJECTION, SOLUTION INTRAVENOUS at 12:10

## 2018-10-05 RX ADMIN — PHENYLEPHRINE HYDROCHLORIDE 400 MCG: 10 INJECTION INTRAVENOUS at 03:10

## 2018-10-05 RX ADMIN — ALBUMIN HUMAN 25 G: 0.05 INJECTION, SOLUTION INTRAVENOUS at 09:10

## 2018-10-05 RX ADMIN — Medication 1 MG: at 09:10

## 2018-10-05 RX ADMIN — NYSTATIN 500000 UNITS: 500000 SUSPENSION ORAL at 06:10

## 2018-10-05 RX ADMIN — ALBUMIN (HUMAN): 12.5 SOLUTION INTRAVENOUS at 12:10

## 2018-10-05 RX ADMIN — ROCURONIUM BROMIDE 40 MG: 10 INJECTION, SOLUTION INTRAVENOUS at 12:10

## 2018-10-05 RX ADMIN — PHENYLEPHRINE HYDROCHLORIDE 200 MCG: 10 INJECTION INTRAVENOUS at 12:10

## 2018-10-05 RX ADMIN — FAMOTIDINE 20 MG: 10 INJECTION, SOLUTION INTRAVENOUS at 08:10

## 2018-10-05 RX ADMIN — Medication: at 04:10

## 2018-10-05 RX ADMIN — SODIUM CHLORIDE, SODIUM GLUCONATE, SODIUM ACETATE, POTASSIUM CHLORIDE, MAGNESIUM CHLORIDE, SODIUM PHOSPHATE, DIBASIC, AND POTASSIUM PHOSPHATE: .53; .5; .37; .037; .03; .012; .00082 INJECTION, SOLUTION INTRAVENOUS at 02:10

## 2018-10-05 RX ADMIN — SODIUM CHLORIDE 3 G: 9 INJECTION, SOLUTION INTRAVENOUS at 05:10

## 2018-10-05 RX ADMIN — SODIUM CHLORIDE 1 UNITS/HR: 9 INJECTION, SOLUTION INTRAVENOUS at 08:10

## 2018-10-05 RX ADMIN — PHENYLEPHRINE HYDROCHLORIDE 300 MCG: 10 INJECTION INTRAVENOUS at 02:10

## 2018-10-05 RX ADMIN — HEPARIN SODIUM 800 UNITS: 1000 INJECTION, SOLUTION INTRAVENOUS; SUBCUTANEOUS at 02:10

## 2018-10-05 RX ADMIN — FENTANYL CITRATE 150 MCG: 50 INJECTION, SOLUTION INTRAMUSCULAR; INTRAVENOUS at 03:10

## 2018-10-05 RX ADMIN — ROCURONIUM BROMIDE 40 MG: 10 INJECTION, SOLUTION INTRAVENOUS at 02:10

## 2018-10-05 RX ADMIN — Medication 1 MG: at 06:10

## 2018-10-05 RX ADMIN — MANNITOL 12.5 G: 250 INJECTION, SOLUTION INTRAVENOUS at 12:10

## 2018-10-05 RX ADMIN — ROCURONIUM BROMIDE 30 MG: 10 INJECTION, SOLUTION INTRAVENOUS at 02:10

## 2018-10-05 RX ADMIN — AMPICILLIN SODIUM AND SULBACTAM SODIUM 3 G: 2; 1 INJECTION, POWDER, FOR SOLUTION INTRAMUSCULAR; INTRAVENOUS at 04:10

## 2018-10-05 RX ADMIN — CALCIUM CHLORIDE 500 MG: 100 INJECTION, SOLUTION INTRAVENOUS at 03:10

## 2018-10-05 RX ADMIN — ALBUMIN HUMAN 25 G: 0.05 INJECTION, SOLUTION INTRAVENOUS at 06:10

## 2018-10-05 RX ADMIN — AMPICILLIN SODIUM AND SULBACTAM SODIUM 3 G: 2; 1 INJECTION, POWDER, FOR SOLUTION INTRAMUSCULAR; INTRAVENOUS at 12:10

## 2018-10-05 RX ADMIN — FENTANYL CITRATE 100 MCG: 50 INJECTION, SOLUTION INTRAMUSCULAR; INTRAVENOUS at 06:10

## 2018-10-05 RX ADMIN — SODIUM CHLORIDE 20 UNITS/HR: 9 INJECTION, SOLUTION INTRAVENOUS at 01:10

## 2018-10-05 RX ADMIN — METHYLPREDNISOLONE SODIUM SUCCINATE 500 MG: 500 INJECTION, POWDER, FOR SOLUTION INTRAMUSCULAR; INTRAVENOUS at 12:10

## 2018-10-05 RX ADMIN — POTASSIUM CHLORIDE 80 MEQ: 29.8 INJECTION, SOLUTION INTRAVENOUS at 03:10

## 2018-10-05 RX ADMIN — ENTECAVIR 0.5 MG: 0.5 TABLET ORAL at 10:10

## 2018-10-05 RX ADMIN — ONDANSETRON 8 MG: 8 TABLET, ORALLY DISINTEGRATING ORAL at 11:10

## 2018-10-05 RX ADMIN — ALBUMIN (HUMAN): 12.5 SOLUTION INTRAVENOUS at 01:10

## 2018-10-05 RX ADMIN — MANNITOL 12.5 G: 250 INJECTION, SOLUTION INTRAVENOUS at 03:10

## 2018-10-05 RX ADMIN — DEXTROSE AND SODIUM CHLORIDE: 5; .45 INJECTION, SOLUTION INTRAVENOUS at 07:10

## 2018-10-05 RX ADMIN — HEPATITIS B IMMUNE GLOBULIN (HUMAN) 9999.91 UNITS: 312 INJECTION, SOLUTION INTRAMUSCULAR; INTRAVENOUS at 12:10

## 2018-10-05 RX ADMIN — INSULIN HUMAN 15 UNITS: 100 INJECTION, SOLUTION PARENTERAL at 12:10

## 2018-10-05 RX ADMIN — ROCURONIUM BROMIDE 50 MG: 10 INJECTION, SOLUTION INTRAVENOUS at 04:10

## 2018-10-05 RX ADMIN — PHYTONADIONE 10 MG: 10 INJECTION, EMULSION INTRAMUSCULAR; INTRAVENOUS; SUBCUTANEOUS at 10:10

## 2018-10-05 RX ADMIN — ALBUMIN HUMAN 25 G: 50 SOLUTION INTRAVENOUS at 06:10

## 2018-10-05 RX ADMIN — CALCIUM CHLORIDE 1000 MG: 100 INJECTION, SOLUTION INTRAVENOUS at 03:10

## 2018-10-05 RX ADMIN — INSULIN HUMAN 7 UNITS: 100 INJECTION, SOLUTION PARENTERAL at 10:10

## 2018-10-05 RX ADMIN — INSULIN HUMAN 10 UNITS: 100 INJECTION, SOLUTION PARENTERAL at 02:10

## 2018-10-05 RX ADMIN — PHYTONADIONE 10 MG: 10 INJECTION, EMULSION INTRAMUSCULAR; INTRAVENOUS; SUBCUTANEOUS at 03:10

## 2018-10-05 RX ADMIN — ALBUMIN HUMAN 25 G: 50 SOLUTION INTRAVENOUS at 09:10

## 2018-10-05 RX ADMIN — FUROSEMIDE 50 MG: 10 INJECTION, SOLUTION INTRAMUSCULAR; INTRAVENOUS at 03:10

## 2018-10-05 RX ADMIN — ALBUMIN HUMAN 12.5 G: 0.05 INJECTION, SOLUTION INTRAVENOUS at 09:10

## 2018-10-05 NOTE — OP NOTE
Operative Report    Date of Procedure: 10/05/2018    Surgeon: Cristobal Marmolejo Jr, MD  First Assistant: none    Pre-operative Diagnosis: Allograft liver for transplantation  Post-operative Diagnosis: Same    Procedure(s) Performed:   1. Back Table Preparation of Liver with needle biopsy and vascular reconstruction of replaced right hepatic artery.    Anesthesia: Not applicable  Estimated Blood Loss: Not applicable  Fluids Administered: Not applicable    Findings: Estimated steatosis 0-10%, replaced left hepatic artery and replaced right hepatic artery.  Drains: Not applicable  Specimens: Core biopsy of allograft liver      Preamble  Indications: This report describes only the backbench preparation of the liver prior to transplantation.  The transplant operation itself is described in a separate report.    ABO Confirmation: Immediately following arrival of the donor organ and prior to implantation, a formal ABO confirmation was done according to hospital and [employer] policies.  I confirmed the [employer] ID number of the donor organ and the donor and recipient ABO types, directly verifying these data by comparison with the [employer] report.  This confirmation was personally done by an attending surgeon and circulating nurse, and is officially documented elsewhere.    Time-Out: A complete time out was carried out prior to the procedure, with confirmation of patient identity, correct procedure, correct operative site, appropriate antibiotic prophylaxis, review of any known allergies, and presence of all needed equipment.    Procedure in Detail  The liver was recovered from the transport cooler and inspected for vascular anatomy and overall suitability for transplantation, with findings as noted above.  The remnant of the diaphragm was dissected away.  The phrenic veins and adrenal vein were identified and ligated or sutured as appropriate.  The portal vein and hepatic artery were mobilized toward the hilum of the liver, and  extrahepatic branches were ligated.  A replaced right hepatic artery was reconstructed by implantation to the gastroduodenal artery using 7-0 polypropylene.  Replaced Left hepatic artery was left long.  The vessels were tested for leaks.  A needle biopsy was obtained for permanent section histology using a spring-loaded biopsy device. The liver was kept in ice temperature organ preservation solution until the time of implantation.

## 2018-10-05 NOTE — OP NOTE
Operative Report    Date of Procedure: 10/05/2018    Surgeons:  Surgeon(s) and Role:     * Yony Burns MD - Primary     * Cristobal Marmolejo Jr., MD - Assisting    First Assistant Attestation:  The presence of an additional attending surgeon functioning as first assistant was required due to the complexity of the procedure relative to any available residents. I certify that no resident was available who was qualified to serve as first assistant. Duties performed by the assistant included assisting the primary surgeon.    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 year-old female with Cirrhosis: Type B and D,  (UNOS terminology) who has been evaluated for a liver transplant.  The procedure was thoroughly discussed with the patient, including potential risks, complications, and alternatives. Specific complications mentioned included death, graft non-function, bleeding, infection, rejection, renal failure, respiratory failure, and neurologic deficits, as well as the possibility of other complications not specifically mentioned.    Donor Risk Factors:  Prior to the operation, the patient was advised of any donor-specific risk factors requiring specific disclosure. Factors in this case included nothing that required specific disclosure.      Specific PHS Increased Risk Behavior criteria for the organ donor include:  None    All questions were answered, the patient  voiced appropriate understanding, and she agreed to proceed with the planned procedure.    ABO Confirmation: Immediately following arrival of the donor organ and prior to implantation, a formal ABO confirmation was done according to hospital and UNOS policies.  I confirmed the UNOS ID number of the donor organ ([id number]) and the donor and recipient ABO types, directly verifying these data by comparison with the UNOS Match Run report ([id number].  This confirmation was personally done by an attending surgeon and circulating nurse, and is officially documented elsewhere.    Time-Out: A complete time out was carried out prior to incision, with confirmation of patient identity, correct procedure, correct operative site, appropriate antibiotic prophylaxis, review of any known allergies, and presence of all needed equipment.    Procedure in Detail  Following the induction of general endotracheal anesthesia, appropriate arterial and venous lines were placed by the anesthesia team.  Care was taken to pad all pressure points and avoid any potential traction injuries from positioning. The urinary bladder was catheterized.  Sequential compression boots and Khris Huggers were used. The chest, abdomen, and upper thighs were sterilely prepped and draped.     The abdomen was entered via a wide bilateral subcostal incision. 3000 mL of ascites was removed. The round ligament was ligated and divided. The falciform, left triangular, and gastrohepatic ligaments were divided using the electocautery, with 2-0 silk ties as needed. The Esquivel self-retaining retractor was placed to provide exposure. Adhesions between the abdominal viscera and the abdominal wall and the undersurface of the liver were divided as needed using cautery dissection and silk ties or suture ligatures. Hilar dissection was then carried out, with ligation of the right and left hepatic arteries as well as the cystic duct and the common hepatic duct. The recipient arterial  anatomy was found to be: standard. The portal vein was exposed circumferentially from its bifurcation down to the superior border of the pancreas. The portal vein was found to be partial patent.  Attention was next directed to the right side of the liver where the right triangular ligament was taken down, exposing the bare area of the liver, and the IVC. The infrahepatic IVC was isolated, followed by mobilization of the retrohepatic cava, division of the right adrenal vein, and isolation of the suprahepatic IVC. The patient was given 800 units of heparin prior to caval cross-clamping. portal vein thrombosis was managed with thromboendvenectomy. After assuring adequate stability after cross-clamping, the native liver was excised and the allograft liver was brought to the operative field.  The liver was perfused with cold 5% albumin during implantation to displace the organ preservation solution.  IVC reconstruction technique consisted of end to end ivc using 3-0 and 4-0 polypropylene as appropriate.  The donor portal vein was then anastomosed to the recipient portal inflow site (end portal vein) with 5-0 polypropylene. Once this was completed, anesthesia was notified and the liver was re-perfused. Copious irrigation with warm saline and temporary finger occlusion of portal inflow were used as needed to avoid any problems with hypothermia. Temporary packing, electocautery, and polypropylene suture ligatures were used as appropriate to provide hemostasis. Once this was satisfactory, attention was directed to the arterial reconstruction. This liver did not come from a DCD donor. Arterial inflow was provided to the graft by anastomosing the recipient proper hepatic artery to the donor  other using 7-0 polypropylene. The liver was assessed for adequacy of perfusion, which was satisfactory.     The gallbladder had previously been removed from the donor allograft prior to the procurement, and a temporary packing period was  carried out to help assure hemostatis.   Attention was next directed toward the bile duct. The donor bile duct was prepared and assessed for adequate vascular supply. Biliary reconstruction was then performed by anastomosing the recipient common bile duct to the donor duct using 6-0 PDS sutures.  The biliary stent used was: none.  A final check for hemostasis was made. 2 19f Haim drains were placed through separate incisions below the transverse abdominal incision and placed in the usual positions. The cavity was irrigated with saline. The abdomen was closed in layers using running #1 PDS suture. The incision was irrigated and the skin was closed with staples. At the end of the case all instrument, needle, and sponge counts were correct. The patient was taken to the ICU in good condition.     Fluids Administered:   Crystalloid (mL) 3500   RBC (Units) 1   FFP (Units) 1   Cryoprecipitate (Units)  1   Cell Saver (CCs) 111   Albumin (Units) 750      Specimens: Explant liver  Drains: 19f Haim drains x 2    Additional Findings:    Estimated Blood Loss: 500 mL  Ascites Evacuated: 3000 mL    Ischemic Times:  Time of portal reperfusion: 10/5/2018  3:40 AM  Time of arterial reperfusion: 10/5/2018  4:12 AM  Anastomosis (warm ischemia) time: 31 minutes  Cold ischemia time: 370 minutes    Surgical Data:  Graft type (whole vs. partial): whole liver  IVC reconstruction: end to end ivc  Portal vein status: partial patent  Portal graft performed? no  Donor arterial anatomy: replaced left hepatic and right hepatic  Donor arterial inflow: other  Recipient arterial anatomy: standard  Recipient arterial inflow: proper hepatic artery  Arterial graft performed? no  Biliary reconstruction: end to end (donor common hepatic duct to recipient common bile duct)  Biliary stent: none  Disposition of Vessels from donor of transplanted organ: sent to blood bank  Damage during procurement: yes  Procurement damage comments: replaced right artery  cut in the hilum    Donor Factors:  UNOS ID: [id number]  UNOS Match Run: [id number]  Donor type:  - brain death  Donor with reported PHS increased risk behavior: no  Donor CMV serologic status: Positive  Donor with known cancer: no  Donor HCV serologic status: Negative  Donor HBcAb: Negative  Donor HBV SETH: Negative  Donor HCV SETH: Negative  Liver weight: 1165 grams

## 2018-10-05 NOTE — CARE UPDATE
Pt extubated to 3 L Nasal Cannula by RT per MD order. Following extubation, pt is AAO x4, bilateral breath sounds noted. Restraints removed safely w/o injury. Pt's family brought to bedside, updated on the PoC for remainder of shift.

## 2018-10-05 NOTE — ASSESSMENT & PLAN NOTE
BG goal 140 - 180   Continue IV insulin infusion protocol  Requires intensive BG monitoring while on protocol     ADDENDUM: Numerous insulin boluses given as well as drip rate increases to reach goal BG    Discharge planning:  Likely resume home medication regimen.  Assess for changes in insulin requirement close to d/c date

## 2018-10-05 NOTE — TELEPHONE ENCOUNTER
LATE ENTRY 2100:  Notified by Hai Morales, , that patient is now primary for this organ offer.  With assistance of Dunia, , patient notified.  Understanding expressed.

## 2018-10-05 NOTE — ANESTHESIA PROCEDURE NOTES
Central Line    Diagnosis: esld  Patient location during procedure: done in OR  Procedure start time: 10/4/2018 11:11 PM  Timeout: 10/4/2018 11:10 PM  Procedure end time: 10/4/2018 11:40 PM  Staffing  Anesthesiologist: Rd Neff MD  Performed: anesthesiologist   Anesthesiologist was present at the time of the procedure.  Preanesthetic Checklist  Completed: patient identified, site marked, surgical consent, pre-op evaluation, timeout performed, IV checked, risks and benefits discussed, monitors and equipment checked and anesthesia consent given  Indication  Indication: hemodialysis, vascular access     Anesthesia   general anesthesia    Central Line  Skin Prep: skin prepped with ChloraPrep, skin prep agent completely dried prior to procedure  maximum sterile barriers used during central venous catheter insertion  hand hygiene performed prior to central venous catheter insertion  Location: right internal jugular,   Catheter type: triple lumen  Catheter Size: 14 Fr  Ultrasound: vascular probe with ultrasound  Vessel Caliber: medium, patent  Vascular Doppler:  not done, compressibility normal  Needle advanced into vessel with real time Ultrasound guidance.  Guidewire confirmed in vessel.  Sterile sheath used.  Image recorded and saved.   Manometry: Venous cannualation confirmed by visual estimation of blood vessel pressure using manometry.  Insertion Attempts: 1   Securement:line sutured, chlorhexidine patch, sterile dressing applied and blood return through all ports     Post-Procedure  Adverse Events:none

## 2018-10-05 NOTE — SIGNIFICANT EVENT
Pre-operative Discussion Note  Liver Transplant Surgery    This discussion occurred in patients room at 2.30 pm, Oct 4, 2018 with  and daughter and patient present - with the aid of [facility]  services.    Scarlett Reddy is a 68 y.o. female admitted for liver transplant.  I discussed the planned procedure in detail, including expected hospital course and outcomes, benefits, risks, and potential complications.  Complications discussed included death, graft failure, bleeding, infection, rejection, and neurologic problems.  I discussed the risks of anesthesia, as well as the potential need for re-operation.  The possibility of other complications not specifically mentioned was also discussed.  Also, I discussed the need for lifelong immunosuppression and the possibility of serious complications from immunosuppressive drugs.    The discussion included the risks that the patient will incur if she elects to not have the proposed procedure.    Relevant donor-specific risk factors were disclosed and discussed with the patient, including:       Specific PHS Increased Risk Behavior criteria for the organ donor include:  None    HCV Infection not apllicable  Hepatocellular Carcinoma Recurrence: Not applicable.    All questions were answered.  The patient and available family members voice understanding and agree to proceed with the transplant.    UNOS Patient Status  Note on scores:  ICU = 10 = total assistance  TSU = 20-30 = partial assistance  Outpatient admitted for transplant requiring medical care in last year = 40-50 = partial assistance  Scores 60 or higher indicate no assistance, meaning no need for medical care in last year. This would be very unusual for a transplant candidate.    Functional Status: 20% - Very sick, hospitalization necessary: active treatment necessary  Physical Capacity: Limited Mobility

## 2018-10-05 NOTE — ASSESSMENT & PLAN NOTE
Managed per primary team  Avoid hypoglycemia    Recent Labs   Lab  10/05/18   0710   10/05/18   1507   ALT  3,353*   --    --    AST  4,894*   < >  6,709*   ALKPHOS  176*   --    --    BILITOT  4.5*   --    --    PROT  4.4*   --    --    ALBUMIN  2.7*   --    --     < > = values in this interval not displayed.

## 2018-10-05 NOTE — SUBJECTIVE & OBJECTIVE
Follow-up For: Procedure(s) (LRB):  TRANSPLANT, LIVER (N/A)    Post-Operative Day: 1 Day Post-Op     Past Medical History:   Diagnosis Date    Anemia requiring transfusions 8/16/2018    Ascites     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 8/15/2018    Cirrhosis     Cough 8/30/2018    Esophageal varices     Esophageal varices without bleeding 8/15/2018    Hepatic encephalopathy     Hepatitis B     Hepatitis D virus infection     Hypersplenism     Hyponatremia     Hypotension     Liver transplant candidate     Neutropenia     Portal hypertension     Severe malnutrition 8/16/2018    Type 2 diabetes mellitus, with long-term current use of insulin 8/15/2018       Past Surgical History:   Procedure Laterality Date    EGD (ESOPHAGOGASTRODUODENOSCOPY) N/A 8/17/2018    Performed by Travon Delgadillo MD at [facility] ([address] [place] [address])    ESOPHAGOGASTRODUODENOSCOPY N/A 8/17/2018    Procedure: EGD (ESOPHAGOGASTRODUODENOSCOPY);  Surgeon: Travon Delgadillo MD;  Location: [facility] ([address] [place] [address]);  Service: Endoscopy;  Laterality: N/A;       Review of patient's allergies indicates:  No Known Allergies    Family History     None        Tobacco Use    Smoking status: Never Smoker    Smokeless tobacco: Never Used   Substance and Sexual Activity    Alcohol use: No     Frequency: Never    Drug use: No    Sexual activity: Not on file      Review of Systems   Unable to perform ROS: Intubated     Objective:     Vital Signs (Most Recent):  Temp: 98.5 °F (36.9 °C) (10/05/18 1100)  Pulse: 97 (10/05/18 1122)  Resp: 12 (10/05/18 1122)  BP: (!) 120/57 (10/05/18 1100)  SpO2: 99 % (10/05/18 1122) Vital Signs (24h Range):  Temp:  [97.6 °F (36.4 °C)-99.1 °F (37.3 °C)] 98.5 °F (36.9 °C)  Pulse:  [] 97  Resp:  [10-41] 12  SpO2:  [96 %-100 %] 99 %  BP: ()/(48-67) 120/57     Weight: 53.1 kg (117 lb 1 oz)  Body mass index is 20.74 kg/m².      Intake/Output Summary (Last 24 hours) at 10/5/2018 1147  Last data filed at  10/5/2018 1100  Gross per 24 hour   Intake 7753.91 ml   Output 6570 ml   Net 1183.91 ml       Physical Exam   Constitutional: She appears well-developed.   Eyes: EOM are normal.   Neck: Neck supple.   Right IJ CVC in place    Cardiovascular: Normal rate and regular rhythm.   Pulmonary/Chest: Effort normal.   Intubated, mechanical vent sounds appreciated    Abdominal:   VALORIE drain x 2 in place with s/s output    Genitourinary:   Genitourinary Comments: Right femoral art line in place   Bower in place   Neurological:   Unable to complete full neuro exam  Pt spont moves all four ext   Skin: Skin is warm and dry.   Psychiatric:   Unable to assess        Vents:  Vent Mode: Spont (10/05/18 1122)  Ventilator Initiated: Yes (10/05/18 0704)  Set Rate: 14 bmp (10/05/18 0706)  Vt Set: 350 mL (10/05/18 0706)  Pressure Support: 10 cmH20 (10/05/18 1122)  PEEP/CPAP: 5 cmH20 (10/05/18 1122)  Oxygen Concentration (%): 50 (10/05/18 1122)  Peak Airway Pressure: 15 cmH2O (10/05/18 1122)  Plateau Pressure: 0 cmH20 (10/05/18 1122)  Total Ve: 5.46 mL (10/05/18 1122)  F/VT Ratio<105 (RSBI): (!) 23.44 (10/05/18 1122)    Lines/Drains/Airways     Central Venous Catheter Line                 Percutaneous Central Line Insertion/Assessment - double lumen  10/04/18 2311 right femoral less than 1 day         Percutaneous Central Line Insertion/Assessment - double lumen  10/05/18 right internal jugular less than 1 day         Percutaneous Central Line Insertion/Assessment - triple lumen  10/04/18 2311 right internal jugular less than 1 day         Pulmonary Artery Catheter Assessment  10/04/18 2311 less than 1 day          Drain                 Closed/Suction Drain 10/05/18 0426 Right Abdomen Bulb 19 Fr. less than 1 day         Closed/Suction Drain 10/05/18 0427 Right Abdomen Bulb 19 Fr. less than 1 day         NG/OG Tube 10/05/18 orogastric Center mouth less than 1 day         Urethral Catheter 10/04/18 2310 Non-latex;Straight-tip 16 Fr. less than 1  day          Airway                 Airway - Non-Surgical Endotracheal Tube -- days          Arterial Line                 Arterial Line 10/04/18 2240 Left Radial less than 1 day         Arterial Line 10/05/18 Right Femoral less than 1 day          Peripheral Intravenous Line                 Peripheral IV - Single Lumen 10/01/18 1556 Left Antecubital 3 days         Peripheral IV - Single Lumen 10/05/18 Anterior;Proximal;Right Forearm less than 1 day                Significant Labs:    CBC/Anemia Profile:  Recent Labs   Lab  10/04/18   2139   10/05/18   0550  10/05/18   0706  10/05/18   0710  10/05/18   1055   WBC  4.23   --    --    --   9.10  6.45   HGB  9.1*   < >  8.4*   --   10.4*  9.8*   HCT  26.5*   < >  24.8*  29*  30.7*  28.6*   PLT  90*   < >  68*   --   103*  62*   MCV  90   --    --    --   91  90   RDW  15.3*   --    --    --   14.9*  15.1*    < > = values in this interval not displayed.        Chemistries:  Recent Labs   Lab  10/04/18   0635  10/04/18   2139   10/05/18   0426  10/05/18   0550  10/05/18   0710  10/05/18   0713   NA  125*  124*   < >  128*  132*  132*   --    K  3.1*  3.7   < >  4.0  4.4  4.4   --    CL  82*  80*   --    --    --   99   --    CO2  29  33*   --    --    --   20*   --    BUN  96*  95*   --    --    --   21   --    CREATININE  1.4  1.5*   --    --    --   0.6   --    CALCIUM  9.5  9.7   --    --    --   8.2*   --    ALBUMIN  3.9  4.2   < >  2.9*  2.3*  2.7*   --    PROT  5.8*  6.3   --    --    --   4.4*   --    BILITOT  2.1*  2.6*   --    --    --   4.5*   --    ALKPHOS  332*  369*   --    --    --   176*   --    ALT  81*  86*   --   640*   --   3,353*   --    AST  121*  118*   --   979*   --   4,894*   --    MG  2.8*  2.8*   < >  1.9  2.0   --   2.0   PHOS  3.9   --    --    --    --    --   4.2    < > = values in this interval not displayed.       All pertinent labs within the past 24 hours have been reviewed.    Significant Imaging: I have reviewed and interpreted all  pertinent imaging results/findings within the past 24 hours.

## 2018-10-05 NOTE — PROGRESS NOTES
Transplant Note   (TIMBO) presented to the patient's room to follow up post transplant surgery. Pt was observed to be extubated and following commands facilitated by Nurse Berny (squeezing hands and moving toes appropriately).  Pt's , daughter (Susan), Int. Dept  Viktor and Rabbi Berkowitz were present during this visit.  Pt's daughter was visibly upset (tearful) and expressed concern for the sight of the pt while extubated.  TIMBO and Viktor provided active listening, emotional support, and encouragement. Pt's  was quiet, visibly nervous (pacing the floor); however, providing support to their daughter as well. Pt's family asked lots of questions during rounds which were answered by Dr. Matson and the transplant team and the family indicated they felt comfortable with the answers provided.  Pt's pain will be managed by a PCA due to her inability to communicate in English with nursing staff.  Pt's family will remain at the [facility] for local lodging at this time.  Pt's family denied any needs and/or other concerns.  TIMBO remains available for education, resources, support and discharge planning as appropriate.

## 2018-10-05 NOTE — ANESTHESIA PREPROCEDURE EVALUATION
10/04/2018  Scarlett Reddy is a 68 y.o., female. 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     MELD-Na score: 24 at 10/4/2018  9:39 PM  MELD score: 14 at 10/4/2018  9:39 PM  Calculated from:  Serum Creatinine: 1.4 mg/dL at 10/4/2018  6:35 AM  Serum Sodium: 125 mmol/L at 10/4/2018  6:35 AM  Total Bilirubin: 2.1 mg/dL at 10/4/2018  6:35 AM  INR(ratio): 1.1 at 10/4/2018  9:39 PM  Age: 68 years     Lab Results   Component Value Date    WBC 4.23 10/04/2018    HGB 9.1 (L) 10/04/2018    HCT 26.5 (L) 10/04/2018    MCV 90 10/04/2018    PLT 90 (L) 10/04/2018       CMP  Sodium   Date Value Ref Range Status   10/04/2018 125 (L) 136 - 145 mmol/L Final     Potassium   Date Value Ref Range Status   10/04/2018 3.1 (L) 3.5 - 5.1 mmol/L Final     Chloride   Date Value Ref Range Status   10/04/2018 82 (L) 95 - 110 mmol/L Final     CO2   Date Value Ref Range Status   10/04/2018 29 23 - 29 mmol/L Final     Glucose   Date Value Ref Range Status   10/04/2018 109 70 - 110 mg/dL Final     BUN, Bld   Date Value Ref Range Status   10/04/2018 96 (H) 8 - 23 mg/dL Final     Creatinine   Date Value Ref Range Status   10/04/2018 1.4 0.5 - 1.4 mg/dL Final     Calcium   Date Value Ref Range Status   10/04/2018 9.5 8.7 - 10.5 mg/dL Final     Total Protein   Date Value Ref Range Status   10/04/2018 5.8 (L) 6.0 - 8.4 g/dL Final     Albumin   Date Value Ref Range Status   10/04/2018 3.9 3.5 - 5.2 g/dL Final     Total Bilirubin   Date Value Ref Range Status   10/04/2018 2.1 (H) 0.1 - 1.0 mg/dL Final     Comment:     For infants and newborns, interpretation of results should be based  on gestational age, weight and in agreement with clinical  observations.  Premature Infant recommended reference ranges:  Up to 24 hours.............<8.0 mg/dL  Up to 48 hours............<12.0 mg/dL  3-5  days..................<15.0 mg/dL  6-29 days.................<15.0 mg/dL       Alkaline Phosphatase   Date Value Ref Range Status   10/04/2018 332 (H) 55 - 135 U/L Final     AST   Date Value Ref Range Status   10/04/2018 121 (H) 10 - 40 U/L Final     ALT   Date Value Ref Range Status   10/04/2018 81 (H) 10 - 44 U/L Final     Anion Gap   Date Value Ref Range Status   10/04/2018 14 8 - 16 mmol/L Final     eGFR if    Date Value Ref Range Status   10/04/2018 44.2 (A) >60 mL/min/1.73 m^2 Final     eGFR if non    Date Value Ref Range Status   10/04/2018 38.4 (A) >60 mL/min/1.73 m^2 Final     Comment:     Calculation used to obtain the estimated glomerular filtration  rate (eGFR) is the CKD-EPI equation.            Mongolian speaking only. Consented with .     Anesthesia Evaluation    I have reviewed the Patient Summary Reports.     I have reviewed the Medications.     Review of Systems  Anesthesia Hx:  No problems with previous Anesthesia  History of prior surgery of interest to airway management or planning:  Denies Personal Hx of Anesthesia complications.   Pulmonary:  Pulmonary Normal    Renal/:  Renal/ Normal     Hepatic/GI:   Liver Disease, Hepatitis, B    Endocrine:   Diabetes, type 2        Physical Exam  General:  Well nourished    Airway/Jaw/Neck:  Airway Findings: Mouth Opening: Normal Tongue: Normal  General Airway Assessment: Adult  Mallampati: II  TM Distance: Normal, at least 6 cm  Jaw/Neck Findings:  Neck ROM: Normal ROM      Dental:  Dental Findings: In tact   Chest/Lungs:  Chest/Lungs Findings: Clear to auscultation     Heart/Vascular:  Heart Findings: Rate: Normal  Heart murmur: negative       Mental Status:  Mental Status Findings:  Cooperative, Alert and Oriented         Anesthesia Plan  Type of Anesthesia, risks & benefits discussed:  Anesthesia Type:  general  Patient's Preference:   Intra-op Monitoring Plan: arterial line, Crawford-Ikko and standard ASA  monitors  Intra-op Monitoring Plan Comments:   Post Op Pain Control Plan:   Post Op Pain Control Plan Comments:   Induction:   IV  Beta Blocker:  Patient is not currently on a Beta-Blocker (No further documentation required).       Informed Consent: Patient understands risks and agrees with Anesthesia plan.  Questions answered. Anesthesia consent signed with patient.  ASA Score: 4  emergent   Day of Surgery Review of History & Physical:    H&P update referred to the surgeon.         Ready For Surgery From Anesthesia Perspective.

## 2018-10-05 NOTE — OP NOTE
Certification of Assistant at Surgery       Surgery Date: 10/4/2018     Participating Surgeons:  Surgeon(s) and Role:     * Yony Burns MD - Primary     * Cristobal Marmolejo Jr., MD - Assisting    Procedures:  Procedure(s) (LRB):  TRANSPLANT, LIVER (N/A)    Assistant Surgeon's Certification of Necessity:  I understand that section 1842 (b) (6) (d) of the Social Security Act generally prohibits Medicare Part B reasonable charge payment for the services of assistants at surgery in [facility] hospitals when qualified residents are available to furnish such services. I certify that the services for which payment is claimed were medically necessary, and that no qualified resident was available to perform the services. I further understand that these services are subject to post-payment review by the Medicare carrier.      Cristobal Marmolejo Jr, MD    10/05/2018  5:34 AM

## 2018-10-05 NOTE — ASSESSMENT & PLAN NOTE
Mrs. Reddy, a 67yo female with pmhx of  ESLD secondary to hep B cirrhosis, admitted for liver transplantation, now s/p liver transplant POD#0    Neuro:   - Sedation: none  - Pain control per transplant     Pulmonary:   - intubated  - Vent Mode: SIMV  Oxygen Concentration (%):  [] 51  Resp Rate Total:  [14 br/min-23 br/min] 14 br/min  Vt Set:  [350 mL] 350 mL  PEEP/CPAP:  [5 cmH20] 5 cmH20  Pressure Support:  [10 cmH20] 10 cmH20  Mean Airway Pressure:  [7.4 cmH20] 7.4 cmH20  - CXR daily  - ABG prn  - wean vent setting as tolerated      Cardiac:  - Drips: none  - HDS  - Monitor hemodynamic status     Renal:   - BUN/CR: 21/0.6  - Monitor UOP  - Bower in place      Fluids/Electrolytes/Nutrition/GI:   - NPO  - D5 1/2NS @ 100  - Trend CMP  - Replace Lytes PRN       Infectious Disease:   - WBC  4.23  - Trend WBC  - Antibiotics: Bactrim, Valganciclovir  - Steroid taper  - MMF, Prograf  - following Cx (blood, urine)     Hematology/Oncology:  - H&H  8.4/24.8   - INR 2  - Trend CBC/Coags  - See HPI for blood products received      Endocrine:  - Insulin gtt, POCT q1  -- POCT: 120-240s  - Endocrine on board     PPx:  - SQH  - famotidine     Dispo:  Continue ICU care  Primary: Transplant Liver

## 2018-10-05 NOTE — PROGRESS NOTES
Pt arrived from OR intubated with a 7.0 ETT tube 22 cm.  Pt placed on a ventilator with the following settings: SIMV 14 350 +5 10 100%.  ABG done and oxygen decreased to 50%.

## 2018-10-05 NOTE — PLAN OF CARE
Problem: Patient Care Overview  Goal: Plan of Care Review  Recommendations  Recommendation/Intervention:   1. When able to extubate, ADAT to DIabetic with texture per SLP.   2. If unable to extubate, RD to provide TF recommendations.   3. TSU RD to provide post-transplant diet education when appropriate.   RD to monitor.    Goals: Patient to receive nutrition by RD follow-up  Nutrition Goal Status: new    Full assessment completed, see RD Note 10/5/2018.

## 2018-10-05 NOTE — PLAN OF CARE
"Problem: Patient Care Overview  Goal: Plan of Care Review  Outcome: Ongoing (interventions implemented as appropriate)  Dx: POD 0 s/o OLTX    Shift Events: Transferred to SICU, extubated    Neuro: Arouses to voice, Moves all extremities and Follows commands. Pt is oriented per  and family    Vital Signs: BP (!) 103/53   Pulse 89   Temp 97.6 °F (36.4 °C) (Oral)   Resp (!) 21   Ht 5' 3" (1.6 m)   Wt 53.1 kg (117 lb 1 oz)   LMP  (LMP Unknown)   SpO2 100%   Breastfeeding? No   BMI 20.74 kg/m²     Intake/Gtts/Diet: NPO pending swallow study. Gtts: Vaso, Insulin, MIVF.    Output: VALORIE drain x2 to bulb suction, total output 50-60 cc/2hr. UOP  cc/hr.    Pain Management: PCA in use, education performed, pt demonstrates appropriate use at this time.     Labs: Labs drawn q4hr, MD relayed of results. Accuchecks hourly, insulin gtt titrated per orders.    Skin: CDI - heels, elbows, and sacrum w/o redness or breakdown. Midline chevron incision covered with telfa - dsg CDI. VALORIE drain x2 covered with telfa - dsg CDI.           "

## 2018-10-05 NOTE — PROGRESS NOTES
Labs, EKG, Chest x-ray complete. UA sent. Consents signed and placed in chart     Anesthesia escorted pt to OR. TEDS, SCDs in place. Chart sent with consents, family notified

## 2018-10-05 NOTE — PROGRESS NOTES
TRANSPLANT NOTE:    Admit Date: 10/1/2018    ORGAN:   LIVER  Disease Etiology: Cirrhosis: Type B and D  Donor CMV Status: Positive  Donor HCV Status: Negative  Donor HBcAb: Negative  Donor HBV SETH: Negative  Donor HCV SETH: Negative  Whole or Partial: Whole Liver  Biliary Anastomosis: End to End  Arterial Anatomy: Replaced Left Hepatic and Right Hepatic    Scarlett Reddy is a 68 y.o. female s/p     - Brain Death liver transplant on 10/5/2018 (Liver) for Cirrhosis: Type B and D.  This patient will follow the Steroid Induction protocol.  This patients immunosuppression will include a steroid taper over 6 weeks, Cellcept for 12 weeks and Prograf maintenance.  Opportunistic infection prophylaxis will include Valcyte for 3 months (CMV D+ R+), Bactrim for 6 months, and nystatin.  I have reviewed the pre-op medications and those have been restarted those, as appropriate.

## 2018-10-05 NOTE — HPI
Scarlett Reddy is a 68 y.o. female with ESLD secondary to hep B cirrhosis, POD#0 s/p liver transplant earlier this morning. Patient arrives to the SICU this morning intubated with infusions for support. Per operative note, the EBL was said to be 500cc and asicties fluid removed was a total of 3L. Patient was said to have received the following blood products: 1 PRBC, 1FFP, 1Cyro, Albumin 750cc, as well as 3.5L cystralloids. Patient HDS upon arrival to the unit. Post operative labs ordered.

## 2018-10-05 NOTE — CONSULTS
"  [facility]  Adult Nutrition  Consult Note    SUMMARY     Recommendations  Recommendation/Intervention:   1. When able to extubate, ADAT to DIabetic with texture per SLP.   2. If unable to extubate, RD to provide TF recommendations.   3. TSU RD to provide post-transplant diet education when appropriate.   RD to monitor.    Goals: Patient to receive nutrition by RD follow-up  Nutrition Goal Status: new  Communication of RD Recs: (POC)    Reason for Assessment  Reason for Assessment: consult  Diagnosis: transplant/postoperative complications(s/p OLTx 10/5)  Relevant Medical History: decompensated cirrhosis due to Hep B c/b ascites, DM  Interdisciplinary Rounds: did not attend  General Information Comments: Patient currently intubated. OR early this morning for OLTx. (NFPE completed, patient with severe malnutition.)  Nutrition Discharge Planning: TSU RD to provide post-transplant diet education when appropriate.    Nutrition Risk Screen  Nutrition Risk Screen: cultural or Spiritism food preferences    Nutrition/Diet History  Patient Reported Diet/Restrictions/Preferences: diabetic diet, low salt(Cultural preferences)  Food Preferences: Cultural preferences Noted  Do you have any cultural, spiritual, Spiritism conflicts, given your current situation?: yes  Supplemental Drinks or Food Habits: Boost Plus, Ensure Plus(Strawberry)  Food Allergies: NKFA  Factors Affecting Nutritional Intake: NPO, on mechanical ventilation    Anthropometrics  Temp: 98.5 °F (36.9 °C)  Height Method: Stated  Height: 5' 3" (160 cm)  Height (inches): 63 in  Weight Method: Standard Scale  Weight: 53.1 kg (117 lb 1 oz)  Weight (lb): 117.07 lb  Ideal Body Weight (IBW), Female: 115 lb  % Ideal Body Weight, Female (lb): 101.8 lb  BMI (Calculated): 20.8  BMI Grade: 18.5-24.9 - normal  Weight Loss: unintentional  Weight Loss Since Admission: 16 lb 0.4 oz    Lab/Procedures/Meds  Pertinent Labs Reviewed: reviewed  Pertinent Labs " Comments: Na 132, Glu 178, POCT Glu 219-252, HgbA1c 6.0, Alb 2.3  Pertinent Medications Reviewed: reviewed  Pertinent Medications Comments: famotidine, methylprednisolone, prograf, D5, insulin drip    Physical Findings/Assessment  Overall Physical Appearance: on ventilator support, loss of muscle mass, loss of subcutaneous fat  Oral/Mouth Cavity: tooth/teeth missing  Skin: incision(s)    Estimated/Assessed Needs  Weight Used For Calorie Calculations: 53.1 kg (117 lb 1 oz)  Energy Calorie Requirements (kcal): 1205 kcal/day  Energy Need Method: [facility]  Protein Requirements: 64-80 g/day(1.2-1.5 g/kg)  Weight Used For Protein Calculations: 53.1 kg (117 lb 1 oz)  Fluid Requirements (mL): 1 mL/kcal or per MD  Fluid Need Method: RDA Method  RDA Method (mL): 1205    Nutrition Prescription Ordered  Current Diet Order: NPO    Evaluation of Received Nutrient/Fluid Intake  I/O: +1.2L x 24hrs  Comments: LBM 10/4  % Intake of Estimated Energy Needs: 0 - 25 %  % Meal Intake: NPO    Nutrition Risk  Level of Risk/Frequency of Follow-up: high(2x/week)     Assessment and Plan  Severe malnutrition    Nutrition Problem  Severe Malnutrition in the context of Chronic Illness/Injury    Related to (etiology):  ESLD    Signs and Symptoms (as evidenced by):  Energy Intake: <75% of estimated energy requirement for 2 months  Body Fat Depletion: Severe depletion of orbitals and triceps   Muscle Mass Depletion: Severe depletion of temples, clavicle region and lower extremities   Weight Loss: 12.31% x 1 month    Nutrition Diagnosis Status:  Worsening        Monitor and Evaluation  Food and Nutrient Intake: energy intake, food and beverage intake  Food and Nutrient Adminstration: diet order  Knowledge/Beliefs/Attitudes: food and nutrition knowledge/skill  Physical Activity and Function: nutrition-related ADLs and IADLs  Anthropometric Measurements: weight change, weight  Biochemical Data, Medical Tests and Procedures: electrolyte and renal  panel, gastrointestinal profile, glucose/endocrine profile, inflammatory profile, lipid profile  Nutrition-Focused Physical Findings: overall appearance     Nutrition Follow-Up  RD Follow-up?: Yes

## 2018-10-05 NOTE — ANESTHESIA PROCEDURE NOTES
Arterial    Diagnosis: ESLD  Doctor requesting consult: Yousif    Patient location during procedure: done in OR  Procedure start time: 10/4/2018 10:40 PM  Timeout: 10/4/2018 10:40 PM  Procedure end time: 10/4/2018 10:44 PM  Staffing  Resident/CRNA: Rayna Cardenas CRNA  Performed: resident/CRNA   Anesthesiologist was present at the time of the procedure.  Preanesthetic Checklist  Completed: patient identified, site marked, surgical consent, pre-op evaluation, timeout performed, IV checked, risks and benefits discussed, monitors and equipment checked and anesthesia consent givenArterial  Skin Prep: chlorhexidine gluconate  Local Infiltration: none  Orientation: left  Location: radial  Catheter Size: 20 G  Catheter placement by Anatomical landmarks. Heme positive aspiration all ports.Insertion Attempts: 2

## 2018-10-05 NOTE — H&P
[facility]  Critical Care - Surgery  History & Physical    Patient Name: Scarlett Reddy  MRN: [id number]  Admission Date: 10/1/2018  Code Status: Full Code  Attending Physician: Benson Matson MD   Primary Care Provider: Primary Doctor No   Principal Problem: Liver transplanted    Subjective:     HPI:  Scarlett Reddy is a 68 y.o. female with ESLD secondary to hep B cirrhosis, POD#0 s/p liver transplant earlier this morning. Patient arrives to the SICU this morning intubated with infusions for support. Per operative note, the EBL was said to be 500cc and asicties fluid removed was a total of 3L. Patient was said to have received the following blood products: 1 PRBC, 1FFP, 1Cyro, Albumin 750cc, as well as 3.5L cystralloids. Patient HDS upon arrival to the unit. Post operative labs ordered.       Hospital/ICU Course:  No notes on file    Follow-up For: Procedure(s) (LRB):  TRANSPLANT, LIVER (N/A)    Post-Operative Day: 1 Day Post-Op     Past Medical History:   Diagnosis Date    Anemia requiring transfusions 8/16/2018    Ascites     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 8/15/2018    Cirrhosis     Cough 8/30/2018    Esophageal varices     Esophageal varices without bleeding 8/15/2018    Hepatic encephalopathy     Hepatitis B     Hepatitis D virus infection     Hypersplenism     Hyponatremia     Hypotension     Liver transplant candidate     Neutropenia     Portal hypertension     Severe malnutrition 8/16/2018    Type 2 diabetes mellitus, with long-term current use of insulin 8/15/2018       Past Surgical History:   Procedure Laterality Date    EGD (ESOPHAGOGASTRODUODENOSCOPY) N/A 8/17/2018    Performed by Travon Delgadillo MD at [facility] ([address] [place] [address])    ESOPHAGOGASTRODUODENOSCOPY N/A 8/17/2018    Procedure: EGD (ESOPHAGOGASTRODUODENOSCOPY);  Surgeon: Travon Delgadillo MD;  Location: [facility] ([address] [place] [address]);  Service: Endoscopy;  Laterality: N/A;       Review of patient's  allergies indicates:  No Known Allergies    Family History     None        Tobacco Use    Smoking status: Never Smoker    Smokeless tobacco: Never Used   Substance and Sexual Activity    Alcohol use: No     Frequency: Never    Drug use: No    Sexual activity: Not on file      Review of Systems   Unable to perform ROS: Intubated     Objective:     Vital Signs (Most Recent):  Temp: 98.5 °F (36.9 °C) (10/05/18 1100)  Pulse: 97 (10/05/18 1122)  Resp: 12 (10/05/18 1122)  BP: (!) 120/57 (10/05/18 1100)  SpO2: 99 % (10/05/18 1122) Vital Signs (24h Range):  Temp:  [97.6 °F (36.4 °C)-99.1 °F (37.3 °C)] 98.5 °F (36.9 °C)  Pulse:  [] 97  Resp:  [10-41] 12  SpO2:  [96 %-100 %] 99 %  BP: ()/(48-67) 120/57     Weight: 53.1 kg (117 lb 1 oz)  Body mass index is 20.74 kg/m².      Intake/Output Summary (Last 24 hours) at 10/5/2018 1147  Last data filed at 10/5/2018 1100  Gross per 24 hour   Intake 7753.91 ml   Output 6570 ml   Net 1183.91 ml       Physical Exam   Constitutional: She appears well-developed.   Eyes: EOM are normal.   Neck: Neck supple.   Right IJ CVC in place    Cardiovascular: Normal rate and regular rhythm.   Pulmonary/Chest: Effort normal.   Intubated, mechanical vent sounds appreciated    Abdominal:   VALORIE drain x 2 in place with s/s output    Genitourinary:   Genitourinary Comments: Right femoral art line in place   Bower in place   Neurological:   Unable to complete full neuro exam  Pt spont moves all four ext   Skin: Skin is warm and dry.   Psychiatric:   Unable to assess        Vents:  Vent Mode: Spont (10/05/18 1122)  Ventilator Initiated: Yes (10/05/18 0704)  Set Rate: 14 bmp (10/05/18 0706)  Vt Set: 350 mL (10/05/18 0706)  Pressure Support: 10 cmH20 (10/05/18 1122)  PEEP/CPAP: 5 cmH20 (10/05/18 1122)  Oxygen Concentration (%): 50 (10/05/18 1122)  Peak Airway Pressure: 15 cmH2O (10/05/18 1122)  Plateau Pressure: 0 cmH20 (10/05/18 1122)  Total Ve: 5.46 mL (10/05/18 1122)  F/VT Ratio<105 (RSBI):  (!) 23.44 (10/05/18 1122)    Lines/Drains/Airways     Central Venous Catheter Line                 Percutaneous Central Line Insertion/Assessment - double lumen  10/04/18 2311 right femoral less than 1 day         Percutaneous Central Line Insertion/Assessment - double lumen  10/05/18 right internal jugular less than 1 day         Percutaneous Central Line Insertion/Assessment - triple lumen  10/04/18 2311 right internal jugular less than 1 day         Pulmonary Artery Catheter Assessment  10/04/18 2311 less than 1 day          Drain                 Closed/Suction Drain 10/05/18 0426 Right Abdomen Bulb 19 Fr. less than 1 day         Closed/Suction Drain 10/05/18 0427 Right Abdomen Bulb 19 Fr. less than 1 day         NG/OG Tube 10/05/18 orogastric Center mouth less than 1 day         Urethral Catheter 10/04/18 2310 Non-latex;Straight-tip 16 Fr. less than 1 day          Airway                 Airway - Non-Surgical Endotracheal Tube -- days          Arterial Line                 Arterial Line 10/04/18 2240 Left Radial less than 1 day         Arterial Line 10/05/18 Right Femoral less than 1 day          Peripheral Intravenous Line                 Peripheral IV - Single Lumen 10/01/18 1556 Left Antecubital 3 days         Peripheral IV - Single Lumen 10/05/18 Anterior;Proximal;Right Forearm less than 1 day                Significant Labs:    CBC/Anemia Profile:  Recent Labs   Lab  10/04/18   2139   10/05/18   0550  10/05/18   0706  10/05/18   0710  10/05/18   1055   WBC  4.23   --    --    --   9.10  6.45   HGB  9.1*   < >  8.4*   --   10.4*  9.8*   HCT  26.5*   < >  24.8*  29*  30.7*  28.6*   PLT  90*   < >  68*   --   103*  62*   MCV  90   --    --    --   91  90   RDW  15.3*   --    --    --   14.9*  15.1*    < > = values in this interval not displayed.        Chemistries:  Recent Labs   Lab  10/04/18   0635  10/04/18   2139   10/05/18   0426  10/05/18   0550  10/05/18   0710  10/05/18   0713   NA  125*  124*   < >   128*  132*  132*   --    K  3.1*  3.7   < >  4.0  4.4  4.4   --    CL  82*  80*   --    --    --   99   --    CO2  29  33*   --    --    --   20*   --    BUN  96*  95*   --    --    --   21   --    CREATININE  1.4  1.5*   --    --    --   0.6   --    CALCIUM  9.5  9.7   --    --    --   8.2*   --    ALBUMIN  3.9  4.2   < >  2.9*  2.3*  2.7*   --    PROT  5.8*  6.3   --    --    --   4.4*   --    BILITOT  2.1*  2.6*   --    --    --   4.5*   --    ALKPHOS  332*  369*   --    --    --   176*   --    ALT  81*  86*   --   640*   --   3,353*   --    AST  121*  118*   --   979*   --   4,894*   --    MG  2.8*  2.8*   < >  1.9  2.0   --   2.0   PHOS  3.9   --    --    --    --    --   4.2    < > = values in this interval not displayed.       All pertinent labs within the past 24 hours have been reviewed.    Significant Imaging: I have reviewed and interpreted all pertinent imaging results/findings within the past 24 hours.    Assessment/Plan:     * Liver transplanted     Mrs. Reddy, a 67yo female with pmhx of  ESLD secondary to hep B cirrhosis, admitted for liver transplantation, now s/p liver transplant POD#0    Neuro:   - Sedation: none  - Pain control per transplant     Pulmonary:   - intubated  - Vent Mode: SIMV  Oxygen Concentration (%):  [] 51  Resp Rate Total:  [14 br/min-23 br/min] 14 br/min  Vt Set:  [350 mL] 350 mL  PEEP/CPAP:  [5 cmH20] 5 cmH20  Pressure Support:  [10 cmH20] 10 cmH20  Mean Airway Pressure:  [7.4 cmH20] 7.4 cmH20  - CXR daily  - ABG prn  - wean vent setting as tolerated      Cardiac:  - Drips: none  - HDS  - Monitor hemodynamic status     Renal:   - BUN/CR: 21/0.6  - Monitor UOP  - Bower in place      Fluids/Electrolytes/Nutrition/GI:   - NPO  - D5 1/2NS @ 100  - Trend CMP  - Replace Lytes PRN       Infectious Disease:   - WBC  4.23  - Trend WBC  - Antibiotics: Bactrim, Valganciclovir  - Steroid taper  - MMF, Prograf  - following Cx (blood, urine)     Hematology/Oncology:  - H&H  8.4/24.8    - INR 2  - Trend CBC/Coags  - See HPI for blood products received      Endocrine:  - Insulin gtt, POCT q1  -- POCT: 120-240s  - Endocrine on board     PPx:  - SQH  - famotidine     Dispo:  Continue ICU care  Primary: Transplant Liver                     Critical care was time spent personally by me on the following activities: development of treatment plan with patient or surrogate and bedside caregivers, discussions with consultants, evaluation of patient's response to treatment, examination of patient, ordering and performing treatments and interventions, ordering and review of laboratory studies, ordering and review of radiographic studies, pulse oximetry, re-evaluation of patient's condition.  This critical care time did not overlap with that of any other provider or involve time for any procedures.     Sunil Lo MD  Critical Care - Surgery  [facility]

## 2018-10-05 NOTE — H&P
"History & Physical   Liver Transplant Surgery      SUBJECTIVE:     Chief Complaint/Reason for Admission: Liver Txp    History of Present Illness: Scarlett Reddy is a 68 y.o. 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    MELD Score: MELD-Na score: 24 at 10/4/2018  9:39 PM  MELD score: 14 at 10/4/2018  9:39 PM  Calculated from:  Serum Creatinine: 1.4 mg/dL at 10/4/2018  6:35 AM  Serum Sodium: 125 mmol/L at 10/4/2018  6:35 AM  Total Bilirubin: 2.1 mg/dL at 10/4/2018  6:35 AM  INR(ratio): 1.1 at 10/4/2018  9:39 PM  Age: 68 years    PTA Medications   Medication Sig    entecavir (BARACLUDE) 0.5 MG Tab Take 1 tablet (0.5 mg total) by mouth once daily.    ergocalciferol, vitamin D2, (VITAMIN D ORAL) Take by mouth.    insulin aspart U-100 (NOVOLOG FLEXPEN U-100 INSULIN) 100 unit/mL InPn pen Inject 20 units into the skin with breakfast,17 units with lunch, and 17 with dinner Plus correction scale, max TDD 60 units daily    insulin glargine (LANTUS) 100 unit/mL injection Inject 20 Units into the skin every evening.    midodrine (PROAMATINE) 5 MG Tab Take 3 tablets (15 mg total) by mouth every 8 (eight) hours.    OMEGA-3 FATTY ACIDS-EPA ORAL Take by mouth.    rifAXIMin (XIFAXAN) 550 mg Tab Take 1 tablet (550 mg total) by mouth 2 (two) times daily.    sodium bicarbonate 650 MG tablet Take 2 tablets (1,300 mg total) by mouth 3 (three) times daily.    blood sugar diagnostic (ACCU-CHEK DC) Strp 1 strip by Misc.(Non-Drug; Combo Route) route 3 (three) times daily.    pen needle, diabetic (BD ULTRA-FINE CITLALI PEN NEEDLE) 32 gauge x 5/32" Ndle To use to inject insulin 4x daily       Review of patient's allergies indicates:  No Known Allergies    Past Medical History:   Diagnosis Date    Anemia requiring transfusions 8/16/2018    Ascites     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 8/15/2018    Cirrhosis     Cough 8/30/2018    Esophageal varices     Esophageal varices " without bleeding 8/15/2018    Hepatic encephalopathy     Hepatitis B     Hepatitis D virus infection     Hypersplenism     Hyponatremia     Hypotension     Liver transplant candidate     Neutropenia     Portal hypertension     Severe malnutrition 8/16/2018    Type 2 diabetes mellitus, with long-term current use of insulin 8/15/2018     Past Surgical History:   Procedure Laterality Date    EGD (ESOPHAGOGASTRODUODENOSCOPY) N/A 8/17/2018    Per[facility] Del[facility]adillo [facility]    ESOPHAGOGASTRODUODENOSCOPY N/A 8/17/2018    Procedure: EGD (ESOPHAGOGASTRODUODENOSCOPY);  Surgeon: Travon Delgadillo MD;  Location: [facility] ([address] [place] [address]);  Service: Endoscopy;  Laterality: N/A;     History reviewed. No pertinent family history.  Social History     Tobacco Use    Smoking status: Never Smoker    Smokeless tobacco: Never Used   Substance Use Topics    Alcohol use: No     Frequency: Never    Drug use: No        Review of Systems  OBJECTIVE:     Vital Signs (Most Recent)  Temp: 97.6 °F (36.4 °C) (10/04/18 1945)  Pulse: 86 (10/04/18 2130)  Resp: 14 (10/04/18 2130)  BP: 110/61 (10/04/18 2130)  SpO2: 100 % (10/04/18 2130)    Physical Exam  Laboratory  CBC:   Recent Labs   Lab  10/04/18   2139   WBC  4.23   RBC  2.96*   HGB  9.1*   HCT  26.5*   PLT  90*   MCV  90   MCH  30.7   MCHC  34.3     CMP:   Recent Labs   Lab  10/04/18   0635   GLU  109   CALCIUM  9.5   ALBUMIN  3.9   PROT  5.8*   NA  125*   K  3.1*   CO2  29   CL  82*   BUN  96*   CREATININE  1.4   ALKPHOS  332*   ALT  81*   AST  121*   BILITOT  2.1*     Coagulation:   Recent Labs   Lab  10/04/18   2139   LABPROT  11.1   INR  1.1   APTT  32.6*       Diagnostic Results:  Labs: Reviewed    ASSESSMENT/PLAN:     The patient presents for liver transplant.  There are no apparent contraindications to proceeding with the planned transplant.  The patient understands that the transplant could potentially be cancelled pending detailed assessment of the donor  organ.    She will receive IV steroids pulse induction.    Malnutrition - mild  Nutritional support: Not indicated  Hepatic encephalopathy - n/a    A complete discussion of the transplant procedure, including risks, complications, and alternatives, as well as any donor-specific risk factors requiring specific disclosure, will be carried out by the responsible staff surgeon prior to the procedure.     To OR for Liver Txp  Will need intra-op dialysis  Labs and images ordered  Consents signed with patient and interpreted     Justine Driver MD  PGY-2 General Surgery   [phone]

## 2018-10-05 NOTE — ANESTHESIA POSTPROCEDURE EVALUATION
"Anesthesia Post Evaluation    Patient: Scarlett Reddy    Procedure(s) Performed: Procedure(s) (LRB):  TRANSPLANT, LIVER (N/A)    Final Anesthesia Type: general  Patient location during evaluation: ICU  Patient participation: Yes- Able to Participate  Level of consciousness: awake and alert and oriented  Post-procedure vital signs: reviewed and stable  Pain management: adequate  Airway patency: patent  PONV status at discharge: No PONV  Anesthetic complications: no      Cardiovascular status: stable  Respiratory status: ETT and ventilator  Hydration status: euvolemic  Follow-up not needed.  Comments: Interview with  with intepreter over his phone        Visit Vitals  BP (!) 92/50   Pulse 84   Temp 36.9 °C (98.4 °F) (Oral)   Resp 11   Ht 5' 3" (1.6 m)   Wt 53.1 kg (117 lb 1 oz)   LMP  (LMP Unknown)   SpO2 100%   Breastfeeding? No   BMI 20.74 kg/m²       Pain/Annette Score: Pain Assessment Performed: Yes (10/5/2018 11:01 AM)  Presence of Pain: non-verbal indicators absent (10/5/2018 11:01 AM)        "

## 2018-10-05 NOTE — PROGRESS NOTES
[facility]  Liver Transplant  Progress Note    Patient Name: Scarlett Reddy  MRN: [id number]  Admission Date: 10/1/2018  Hospital Length of Stay: 3 days  Code Status: Full Code  Primary Care Provider: Primary Doctor No  Post-Op Day 1 Day Post-Op    ORGAN:   LIVER  Disease Etiology: Cirrhosis: Type B and D  Donor Type:    - Brain Death  CDC High Risk:   No  Donor CMV Status:   Donor CMV Status: Positive  Donor HBcAB:   Negative  Donor HCV Status:   Negative  Donor HBV SETH: Negative  Donor HCV SETH: Negative  Whole or Partial: Whole Liver  Biliary Anastomosis: End to End  Arterial Anatomy: Replaced Left Hepatic and Right Hepatic    Subjective:     Interval History:  POD0 Liver Txp  Given 750 albumin  Required a little Vaso post-op  Extubated without complication  dPCA started    Scheduled Meds:   ampicillin-sulbactim (UNASYN) IVPB  3 g Intravenous Q6H    entecavir  0.5 mg Oral Daily    ergocalciferol  50,000 Units Oral Q7 Days    famotidine (PF)  20 mg Intravenous Q12H    heparin (porcine)  5,000 Units Subcutaneous Q8H    hepatitis B immune globulin (HEPA-RALPH B) IVPB  9,999.912 Units Intravenous Daily    lidocaine (PF) 10 mg/ml (1%)  1 mL Other Once    [START ON 10/6/2018] methylPREDNISolone sodium succinate  100 mg Intravenous Q12H    Followed by    [START ON 10/7/2018] methylPREDNISolone sodium succinate  80 mg Intravenous Q12H    Followed by    [START ON 10/8/2018] methylPREDNISolone sodium succinate  60 mg Intravenous Q12H    Followed by    [START ON 10/9/2018] methylPREDNISolone sodium succinate  40 mg Intravenous Q12H    Followed by    [START ON 10/10/2018] methylPREDNISolone sodium succinate  20 mg Intravenous Q12H    Followed by    [START ON 10/11/2018] predniSONE  20 mg Oral Daily    mycophenolate mofetil  1,000 mg Oral BID    nystatin  500,000 Units Mouth/Throat TID PC    phytonadione ((AQUA-MEPHYTON) IVPB  10 mg Intravenous Q8H    sulfamethoxazole-trimethoprim  400-80mg  1 tablet Oral Daily    tacrolimus  1 mg Oral BID    [START ON 10/15/2018] valganciclovir 50 mg/ml  450 mg Per NG tube Daily     Continuous Infusions:   dextrose 5 % and 0.45 % NaCl 100 mL/hr at 10/05/18 1700    hydromorphone in 0.9 % NaCl 6 mg/30 ml      insulin (HUMAN R) infusion (adults) 40 Units/hr (10/05/18 1500)    phenylephrine Stopped (10/05/18 1500)    vasopressin (PITRESSIN) infusion 0.04 Units/hr (10/05/18 1500)     PRN Meds:calcium gluconate IVPB, calcium gluconate IVPB, calcium gluconate IVPB, dextrose 50%, dextrose 50%, magnesium sulfate IVPB, magnesium sulfate IVPB, naloxone, ondansetron, potassium chloride in water **AND** potassium chloride in water **AND** potassium chloride in water, promethazine (PHENERGAN) IVPB, sodium chloride 0.9%, sodium phosphate IVPB, sodium phosphate IVPB, sodium phosphate IVPB    Review of Systems  Objective:     Vital Signs (Most Recent):  Temp: 97.6 °F (36.4 °C) (10/05/18 1700)  Pulse: 84 (10/05/18 1700)  Resp: (!) 25 (10/05/18 1700)  BP: (!) 102/55 (10/05/18 1700)  SpO2: 100 % (10/05/18 1700) Vital Signs (24h Range):  Temp:  [97.6 °F (36.4 °C)-99.1 °F (37.3 °C)] 97.6 °F (36.4 °C)  Pulse:  [] 84  Resp:  [10-41] 25  SpO2:  [98 %-100 %] 100 %  BP: ()/(48-61) 102/55     Weight: 53.1 kg (117 lb 1 oz)  Body mass index is 20.74 kg/m².    Intake/Output - Last 3 Shifts       10/03 0700 - 10/04 0659 10/04 0700 - 10/05 0659 10/05 0700 - 10/06 0659    P.O. 870 400     I.V. (mL/kg)  4750 (89.5) 1435.1 (27)    Blood  1579     NG/GT   200    IV Piggyback   700    Total Intake(mL/kg) 870 (15.8) 6729 (126.7) 2335.1 (44)    Urine (mL/kg/hr) 875 (0.7) 2175 (1.7) 1430 (2.7)    Drains  3000 845    Stool 0 0     Total Output 875 5175 2275    Net -5 +1554 +60.1           Stool Occurrence 0 x 1 x           Physical Exam   Constitutional: She appears well-developed. No distress.   HENT:   Head: Normocephalic and atraumatic.   Eyes: EOM are normal. Pupils are equal,  round, and reactive to light.   Neck: Neck supple. No tracheal deviation present.   Cardiovascular: Normal rate, regular rhythm and intact distal pulses.   Pulmonary/Chest: Effort normal. No respiratory distress.   Abdominal: Soft. She exhibits no distension. There is tenderness (appropriate).   2 RLQ VALORIE drains in place   Musculoskeletal: She exhibits no edema.   Neurological: She is alert.   Skin: Skin is warm and dry.   Psychiatric: She has a normal mood and affect.       Laboratory:  Immunosuppressants         Stop Route Frequency     mycophenolate mofetil 200 mg/mL suspension 1,000 mg      -- Oral 2 times daily     tacrolimus (PROGRAF) 1 mg/mL oral syringe      -- Oral 2 times daily        CBC:   Recent Labs   Lab  10/05/18   1507   WBC  5.79   RBC  3.27*   HGB  10.1*   HCT  29.4*   PLT  51*   MCV  90   MCH  30.9   MCHC  34.4     CMP:   Recent Labs   Lab  10/05/18   0710   10/05/18   1406  10/05/18   1507   GLU  216*   --    --    --    CALCIUM  8.2*   --    --    --    ALBUMIN  2.7*   --    --    --    PROT  4.4*   --    --    --    NA  132*   --    --    --    K  4.4   --   2.7*   --    CO2  20*   --    --    --    CL  99   --    --    --    BUN  21   --    --    --    CREATININE  0.6   --    --    --    ALKPHOS  176*   --    --    --    ALT  3,353*   --    --    --    AST  4,894*   < >   --   6,709*   BILITOT  4.5*   --    --    --     < > = values in this interval not displayed.     Coagulation:   Recent Labs   Lab  10/05/18   0710   10/05/18   1507   INR  1.7*   < >  2.0*   APTT  32.9*   --    --     < > = values in this interval not displayed.     ABGs:   Recent Labs   Lab  10/05/18   1521   PH  7.387   PCO2  36.9   HCO3  22.2*   POCSATURATED  100   BE  -3     Labs within the past 24 hours have been reviewed.    Diagnostic Results:  I have personally reviewed all pertinent imaging studies over the past 24hr    Assessment/Plan:   Scarlett Reddy is a 68 y.o. female     Neuro:   - Sedation: Off currently  -  Pain control: dPCA     Pulmonary:   - Extubated  - O2 as needed to maintain sats >92%  - AM CXR     Cardiac:  - Drips: Vaso/jimbo  - Wean as tolerated  - Continue to monitor     Renal:   - BUN/CR: 21/0.6  - Trend BMP     Infectious Disease:   - WBC 5.79  - Trend WBC  - Antibiotics: Unasyn post-op  - MMF, Prograf  - Bactrim, Valganciclovir     Hematology/Oncology:  - H&H 10.1/29.4  - INR 2.0  - Plt 51  - Trend CBC     Endocrine:  - Insulin gtt  - Monitor BG     Fluids/Electrolytes/Nutrition/GI:   - CLD, advance to diabetic diet  - Trend CMP  - Replace Lytes PRN   - Entecavir 0.5 daily  - Continue Pepcid BID  - Zofran for nausea     Dispo:  Continue ICU care    The patients clinical status was discussed at multidisplinary rounds, involving transplant surgery, transplant medicine, pharmacy, nursing, nutrition, and social work.    Eitan Marvin MD  Liver Transplant  [facility]

## 2018-10-05 NOTE — PROGRESS NOTES
"[facility]  Endocrinology  Progress Note    Admit Date: 10/1/2018     Reason for Consult: Management of type 2 DM, Hyperglycemia      Surgical Procedure and Date: Liver transplant 10/5/18     Diabetes diagnosis year: 2013     Home Diabetes Medications:  Lantus 18 units HS and novolog 17 units with meals plus correction scale (150-200 +1)        Lab Results   Component Value Date     HGBA1C 6.8 (H) 08/16/2018        How often checking glucose at home? 3-4   BG readings on regimen: 120-150.  Post prandial readings as high as upper 200s  Hypoglycemia on the regimen? yes, none recently   Missed doses on regimen?  No     Diabetes Complications include:     Diabetic peripheral neuropathy      Complicating diabetes co morbidities:   CIRRHOSIS        HPI:  Patient is a 68 y.o. female with a diagnosis of ESLD, listed for liver transplant with meld 23 who underwent live transplant on 10/5/18.  Admitted 10/1/18 for hyponatremia s/p paracentesis.    She speaks primarily Hungarian. Information obtained from recent past admission and discussion over the phone via  ([place]). Family not at bedside. Pt request family at bedside for future conversations.   Personal has known diagnosis of diabetes, endocrine consulted for diabetes management.        Interval HPI:   Overnight events: Transferred to ICU, POD 0 liver transplant - intubated, sedated, on pressors.  Insulin drip intra-op, restarted upon arrival to ICU.  Eating:   NPO  Nausea: No  Hypoglycemia and intervention: No  Fever: No  TPN and/or TF: No    /60 (BP Location: Right arm, Patient Position: Lying)   Pulse 105   Temp 98.8 °F (37.1 °C) (Core ([place]))   Resp (!) 27   Ht 5' 3" (1.6 m)   Wt 53.1 kg (117 lb 1 oz)   LMP  (LMP Unknown)   SpO2 100%   Breastfeeding? No   BMI 20.74 kg/m²      Labs Reviewed and Include    Recent Labs   Lab  10/04/18   2139   10/05/18   0426  10/05/18   0550   GLU  222*   < >  187*  178*   CALCIUM  9.7   " --    --    --    ALBUMIN  4.2   < >  2.9*  2.3*   PROT  6.3   --    --    --    NA  124*   < >  128*  132*   K  3.7   < >  4.0  4.4   CO2  33*   --    --    --    CL  80*   --    --    --    BUN  95*   --    --    --    CREATININE  1.5*   --    --    --    ALKPHOS  369*   --    --    --    ALT  86*   --   640*   --    AST  118*   --   979*   --    BILITOT  2.6*   --    --    --     < > = values in this interval not displayed.     Lab Results   Component Value Date    WBC 9.10 10/05/2018    HGB 10.4 (L) 10/05/2018    HCT 30.7 (L) 10/05/2018    MCV 91 10/05/2018     (L) 10/05/2018     No results for input(s): TSH, FREET4 in the last 168 hours.  Lab Results   Component Value Date    HGBA1C 6.0 (H) 10/04/2018       Nutritional status:   Body mass index is 20.74 kg/m².  Lab Results   Component Value Date    ALBUMIN 2.3 (L) 10/05/2018    ALBUMIN 2.9 (L) 10/05/2018    ALBUMIN 3.7 10/05/2018     Lab Results   Component Value Date    PREALBUMIN 12 (L) 09/17/2018       Estimated Creatinine Clearance: 29.3 mL/min (A) (based on SCr of 1.5 mg/dL (H)).    Accu-Checks  Recent Labs      10/03/18   1257  10/03/18   1805  10/03/18   2110  10/04/18   0821  10/04/18   1406  10/04/18   2012  10/05/18   0705  10/05/18   0803  10/05/18   0908  10/05/18   0952   POCTGLUCOSE  187*  207*  118*  126*  252*  245*  219*  300*  325*  352*       Current Medications and/or Treatments Impacting Glycemic Control  Immunotherapy:    Immunosuppressants         Stop Route Frequency     mycophenolate mofetil 200 mg/mL suspension 1,000 mg      -- Oral 2 times daily     tacrolimus (PROGRAF) 1 mg/mL oral syringe      -- Oral 2 times daily        Steroids:   Hormones (From admission, onward)    Start     Stop Route Frequency Ordered    10/11/18 0900  predniSONE tablet 20 mg  (methylprednisolone taper panel)      -- Oral Daily 10/05/18 0709    10/10/18 0900  methylPREDNISolone sodium succinate injection 20 mg  (methylprednisolone taper panel)       10/11 0859 IV Every 12 hours 10/05/18 0709    10/09/18 0900  methylPREDNISolone sodium succinate injection 40 mg  (methylprednisolone taper panel)      10/10 0859 IV Every 12 hours 10/05/18 0709    10/08/18 0900  methylPREDNISolone sodium succinate injection 60 mg  (methylprednisolone taper panel)      10/09 0859 IV Every 12 hours 10/05/18 0709    10/07/18 0900  methylPREDNISolone sodium succinate injection 80 mg  (methylprednisolone taper panel)      10/08 0859 IV Every 12 hours 10/05/18 0709    10/06/18 0900  methylPREDNISolone sodium succinate injection 100 mg  (methylprednisolone taper panel)      10/07 0859 IV Every 12 hours 10/05/18 0709    10/05/18 0942  vasopressin (PITRESSIN) 0.2 Units/mL in dextrose 5 % 100 mL infusion      -- IV Continuous 10/05/18 0943        Pressors:    Autonomic Drugs (From admission, onward)    Start     Stop Route Frequency Ordered    10/01/18 2200  midodrine tablet 15 mg      -- Oral Every 8 hours 10/01/18 1617        Hyperglycemia/Diabetes Medications:   Antihyperglycemics (From admission, onward)    Start     Stop Route Frequency Ordered    10/05/18 0830  insulin regular (Humulin R) 100 Units in sodium chloride 0.9% 100 mL infusion     Question:  Insulin Rate Adjustment (DO NOT MODIFY ANSWER)  Answer:  [email or web address]    -- IV Continuous 10/05/18 0720          ASSESSMENT and PLAN    * Liver transplanted    Managed per primary team  Avoid hypoglycemia    Recent Labs   Lab  10/05/18   0710   10/05/18   1507   ALT  3,353*   --    --    AST  4,894*   < >  6,709*   ALKPHOS  176*   --    --    BILITOT  4.5*   --    --    PROT  4.4*   --    --    ALBUMIN  2.7*   --    --     < > = values in this interval not displayed.               Type 2 diabetes mellitus with diabetic polyneuropathy, with long-term current use of insulin    BG goal 140 - 180   Continue IV insulin infusion protocol  Requires intensive BG monitoring while on  protocol     ADDENDUM: Numerous insulin boluses given as well as drip rate increases to reach goal BG    Discharge planning:  Likely resume home medication regimen.  Assess for changes in insulin requirement close to d/c date        Hyponatremia    Na 119 on admit 10/1  Managed per primary team          Severe malnutrition    Oral intake encouraged, may affect BG readings        Adverse effect of corticosteroids    On standard steroid taper per transplant team; may elevate BG readings            Prophylactic immunotherapy    May increase insulin resistance.               Be Willams NP  Endocrinology  [facility]

## 2018-10-05 NOTE — PROGRESS NOTES
Saw patient in the ICU.  Patient's daughter and  were at her bedside.  Gave them My New Journey: Living Smart After My Liver Transplant.  Informed daughter that we will review book with  next week.  Attempted to call language line. Daughter declined at this time.

## 2018-10-05 NOTE — SUBJECTIVE & OBJECTIVE
"Interval HPI:   Overnight events: Transferred to ICU, POD 0 liver transplant - intubated, sedated, on pressors.  Insulin drip intra-op, restarted upon arrival to ICU.  Eating:   NPO  Nausea: No  Hypoglycemia and intervention: No  Fever: No  TPN and/or TF: No    /60 (BP Location: Right arm, Patient Position: Lying)   Pulse 105   Temp 98.8 °F (37.1 °C) (Core ([place]))   Resp (!) 27   Ht 5' 3" (1.6 m)   Wt 53.1 kg (117 lb 1 oz)   LMP  (LMP Unknown)   SpO2 100%   Breastfeeding? No   BMI 20.74 kg/m²     Labs Reviewed and Include    Recent Labs   Lab  10/04/18   2139   10/05/18   0426  10/05/18   0550   GLU  222*   < >  187*  178*   CALCIUM  9.7   --    --    --    ALBUMIN  4.2   < >  2.9*  2.3*   PROT  6.3   --    --    --    NA  124*   < >  128*  132*   K  3.7   < >  4.0  4.4   CO2  33*   --    --    --    CL  80*   --    --    --    BUN  95*   --    --    --    CREATININE  1.5*   --    --    --    ALKPHOS  369*   --    --    --    ALT  86*   --   640*   --    AST  118*   --   979*   --    BILITOT  2.6*   --    --    --     < > = values in this interval not displayed.     Lab Results   Component Value Date    WBC 9.10 10/05/2018    HGB 10.4 (L) 10/05/2018    HCT 30.7 (L) 10/05/2018    MCV 91 10/05/2018     (L) 10/05/2018     No results for input(s): TSH, FREET4 in the last 168 hours.  Lab Results   Component Value Date    HGBA1C 6.0 (H) 10/04/2018       Nutritional status:   Body mass index is 20.74 kg/m².  Lab Results   Component Value Date    ALBUMIN 2.3 (L) 10/05/2018    ALBUMIN 2.9 (L) 10/05/2018    ALBUMIN 3.7 10/05/2018     Lab Results   Component Value Date    PREALBUMIN 12 (L) 09/17/2018       Estimated Creatinine Clearance: 29.3 mL/min (A) (based on SCr of 1.5 mg/dL (H)).    Accu-Checks  Recent Labs      10/03/18   1257  10/03/18   1805  10/03/18   2110  10/04/18   0821  10/04/18   1406  10/04/18   2012  10/05/18   0705  10/05/18   0803  10/05/18   0908  10/05/18   0952   POCTGLUCOSE  " 187*  207*  118*  126*  252*  245*  219*  300*  325*  352*       Current Medications and/or Treatments Impacting Glycemic Control  Immunotherapy:    Immunosuppressants         Stop Route Frequency     mycophenolate mofetil 200 mg/mL suspension 1,000 mg      -- Oral 2 times daily     tacrolimus (PROGRAF) 1 mg/mL oral syringe      -- Oral 2 times daily        Steroids:   Hormones (From admission, onward)    Start     Stop Route Frequency Ordered    10/11/18 0900  predniSONE tablet 20 mg  (methylprednisolone taper panel)      -- Oral Daily 10/05/18 0709    10/10/18 0900  methylPREDNISolone sodium succinate injection 20 mg  (methylprednisolone taper panel)      10/11 0859 IV Every 12 hours 10/05/18 0709    10/09/18 0900  methylPREDNISolone sodium succinate injection 40 mg  (methylprednisolone taper panel)      10/10 0859 IV Every 12 hours 10/05/18 0709    10/08/18 0900  methylPREDNISolone sodium succinate injection 60 mg  (methylprednisolone taper panel)      10/09 0859 IV Every 12 hours 10/05/18 0709    10/07/18 0900  methylPREDNISolone sodium succinate injection 80 mg  (methylprednisolone taper panel)      10/08 0859 IV Every 12 hours 10/05/18 0709    10/06/18 0900  methylPREDNISolone sodium succinate injection 100 mg  (methylprednisolone taper panel)      10/07 0859 IV Every 12 hours 10/05/18 0709    10/05/18 0942  vasopressin (PITRESSIN) 0.2 Units/mL in dextrose 5 % 100 mL infusion      -- IV Continuous 10/05/18 0943        Pressors:    Autonomic Drugs (From admission, onward)    Start     Stop Route Frequency Ordered    10/01/18 2200  midodrine tablet 15 mg      -- Oral Every 8 hours 10/01/18 1617        Hyperglycemia/Diabetes Medications:   Antihyperglycemics (From admission, onward)    Start     Stop Route Frequency Ordered    10/05/18 0830  insulin regular (Humulin R) 100 Units in sodium chloride 0.9% 100 mL infusion     Question:  Insulin Rate Adjustment (DO NOT MODIFY ANSWER)  Answer:   [email or web address]    -- IV Continuous 10/05/18 0701

## 2018-10-05 NOTE — ANESTHESIA PROCEDURE NOTES
Central Line    Diagnosis: esld  Patient location during procedure: done in OR  Procedure start time: 10/4/2018 11:11 PM  Timeout: 10/4/2018 11:10 PM  Procedure end time: 10/4/2018 11:40 PM  Staffing  Anesthesiologist: Rd Neff MD  Performed: anesthesiologist   Anesthesiologist was present at the time of the procedure.  Preanesthetic Checklist  Completed: patient identified, site marked, surgical consent, pre-op evaluation, timeout performed, IV checked, risks and benefits discussed, monitors and equipment checked and anesthesia consent given  Indication  Indication: hemodialysis     Anesthesia   general anesthesia    Central Line  Skin Prep: skin prepped with ChloraPrep, skin prep agent completely dried prior to procedure  maximum sterile barriers used during central venous catheter insertion  hand hygiene performed prior to central venous catheter insertion  Location: right femoral,   Catheter type: double lumen  Catheter Size: 14 Fr  Ultrasound: vascular probe with ultrasound  Vessel Caliber: medium, patent  Vascular Doppler:  not done, compressibility normal  Needle advanced into vessel with real time Ultrasound guidance.  Guidewire confirmed in vessel.  Sterile sheath used.  Image recorded and saved.   Manometry: Venous cannualation confirmed by visual estimation of blood vessel pressure using manometry.  Insertion Attempts: 1   Securement:line sutured and blood return through all ports     Post-Procedure  Adverse Events:none

## 2018-10-05 NOTE — TRANSFER OF CARE
"Anesthesia Transfer of Care Note    Patient: Scarlett Reddy    Procedure(s) Performed: Procedure(s) (LRB):  TRANSPLANT, LIVER (N/A)    Patient location: ICU    Anesthesia Type: general    Transport from OR: Upon arrival to PACU/ICU, patient attached to ventilator and auscultated to confirm bilateral breath sounds and adequate TV. Continuous ECG monitoring in transport. Continuous SpO2 monitoring in transport. Continuos invasive BP monitoring in transport    Post pain: adequate analgesia    Post assessment: no apparent anesthetic complications and tolerated procedure well    Post vital signs: stable    Level of consciousness: sedated    Nausea/Vomiting: no vomiting    Complications: none    Transfer of care protocol was followed      Last vitals:   Visit Vitals  /61   Pulse 86   Temp 36.4 °C (97.6 °F) (Oral)   Resp 14   Ht 5' 3" (1.6 m)   Wt 53.1 kg (117 lb 1 oz)   LMP  (LMP Unknown)   SpO2 100%   Breastfeeding? No   BMI 20.74 kg/m²     "

## 2018-10-05 NOTE — ANESTHESIA PROCEDURE NOTES
Arterial    Diagnosis: esld    Patient location during procedure: done in OR  Procedure start time: 10/4/2018 11:11 PM  Timeout: 10/4/2018 11:10 PM  Procedure end time: 10/4/2018 11:40 PM  Staffing  Anesthesiologist: Rd Neff MD  Performed: anesthesiologist   Anesthesiologist was present at the time of the procedure.  Preanesthetic Checklist  Completed: patient identified, site marked, surgical consent, pre-op evaluation, timeout performed, IV checked, risks and benefits discussed, monitors and equipment checked and anesthesia consent givenArterial  Skin Prep: chlorhexidine gluconate and isopropyl alcohol  Local Infiltration: none  Orientation: right  Location: femoral  Catheter Size: 4 Fr Cook  Catheter placement by Ultrasound guidance and Anatomical landmarks. Heme positive aspiration all ports.  Vessel Caliber: medium, patent, compressibility normal  Vascular Doppler:  not done  Needle advanced into vessel with real time Ultrasound guidance.  Guidewire confirmed in vessel.  Sterile sheath used.  Image recorded and saved.Insertion Attempts: 1  Assessment  Dressing: sutured in place and taped  Patient: Tolerated well

## 2018-10-05 NOTE — ANESTHESIA PROCEDURE NOTES
[place] [facility]    Diagnosis: esld  Patient location during procedure: done in OR  Procedure start time: 10/4/2018 11:11 PM  Timeout: 10/4/2018 11:10 PM  Procedure end time: 10/4/2018 11:40 PM  Staffing  Anesthesiologist: Rd Neff MD  Performed: anesthesiologist   Anesthesiologist was present at the time of the procedure.  Preanesthetic Checklist  Completed: patient identified, site marked, surgical consent, pre-op evaluation, timeout performed, IV checked, risks and benefits discussed, monitors and equipment checked and anesthesia consent given  [place] [facility]  Skin Prep: chlorhexidine gluconate and isopropyl alcohol  Local Infiltration: none  Location: right,  internal jugular vein  Vessel Caliber: medium, patent, compressibility normal  Vascular Doppler:  not done  Introducer: 14 Fr double lumen, manometry used.  Device: CCO/Oximetric Catheter  Catheter Size: 8 Fr  Catheter placement by yes. Heme positive aspiration all ports. PAC floated with balloon up not wedgedSterile sheath usedInsertion Attempts: 1  Indication: hemodynamic monitoring, intravenous therapy  Ultrasound Guidance  Needle advanced into vessel with real time Ultrasound guidance.  Image recorded and saved.  Guidewire confirmed in vessel.  Sterile sheath used.  Assessment  Central Line Bundle Protocol followed. Hand hygiene before procedure, surgical cap worn, surgical mask worn, sterile surgical gloves worn, large sterile drape used.  Verification: blood return  Dressing: sutured in place and taped  Patient: Tolerated Well

## 2018-10-05 NOTE — SUBJECTIVE & OBJECTIVE
Scheduled Meds:   ampicillin-sulbactim (UNASYN) IVPB  3 g Intravenous Q6H    entecavir  0.5 mg Oral Daily    ergocalciferol  50,000 Units Oral Q7 Days    famotidine (PF)  20 mg Intravenous Q12H    heparin (porcine)  5,000 Units Subcutaneous Q8H    hepatitis B immune globulin (HEPA-RALPH B) IVPB  9,999.912 Units Intravenous Daily    lidocaine (PF) 10 mg/ml (1%)  1 mL Other Once    [START ON 10/6/2018] methylPREDNISolone sodium succinate  100 mg Intravenous Q12H    Followed by    [START ON 10/7/2018] methylPREDNISolone sodium succinate  80 mg Intravenous Q12H    Followed by    [START ON 10/8/2018] methylPREDNISolone sodium succinate  60 mg Intravenous Q12H    Followed by    [START ON 10/9/2018] methylPREDNISolone sodium succinate  40 mg Intravenous Q12H    Followed by    [START ON 10/10/2018] methylPREDNISolone sodium succinate  20 mg Intravenous Q12H    Followed by    [START ON 10/11/2018] predniSONE  20 mg Oral Daily    mycophenolate mofetil  1,000 mg Oral BID    nystatin  500,000 Units Mouth/Throat TID PC    phytonadione ((AQUA-MEPHYTON) IVPB  10 mg Intravenous Q8H    sulfamethoxazole-trimethoprim 400-80mg  1 tablet Oral Daily    tacrolimus  1 mg Oral BID    [START ON 10/15/2018] valganciclovir 50 mg/ml  450 mg Per NG tube Daily     Continuous Infusions:   dextrose 5 % and 0.45 % NaCl 100 mL/hr at 10/05/18 1700    hydromorphone in 0.9 % NaCl 6 mg/30 ml      insulin (HUMAN R) infusion (adults) 40 Units/hr (10/05/18 1500)    phenylephrine Stopped (10/05/18 1500)    vasopressin (PITRESSIN) infusion 0.04 Units/hr (10/05/18 1500)     PRN Meds:calcium gluconate IVPB, calcium gluconate IVPB, calcium gluconate IVPB, dextrose 50%, dextrose 50%, magnesium sulfate IVPB, magnesium sulfate IVPB, naloxone, ondansetron, potassium chloride in water **AND** potassium chloride in water **AND** potassium chloride in water, promethazine (PHENERGAN) IVPB, sodium chloride 0.9%, sodium phosphate IVPB, sodium  phosphate IVPB, sodium phosphate IVPB    Review of Systems  Objective:     Vital Signs (Most Recent):  Temp: 97.6 °F (36.4 °C) (10/05/18 1700)  Pulse: 84 (10/05/18 1700)  Resp: (!) 25 (10/05/18 1700)  BP: (!) 102/55 (10/05/18 1700)  SpO2: 100 % (10/05/18 1700) Vital Signs (24h Range):  Temp:  [97.6 °F (36.4 °C)-99.1 °F (37.3 °C)] 97.6 °F (36.4 °C)  Pulse:  [] 84  Resp:  [10-41] 25  SpO2:  [98 %-100 %] 100 %  BP: ()/(48-61) 102/55     Weight: 53.1 kg (117 lb 1 oz)  Body mass index is 20.74 kg/m².    Intake/Output - Last 3 Shifts       10/03 0700 - 10/04 0659 10/04 0700 - 10/05 0659 10/05 0700 - 10/06 0659    P.O. 870 400     I.V. (mL/kg)  4750 (89.5) 1435.1 (27)    Blood  1579     NG/GT   200    IV Piggyback   700    Total Intake(mL/kg) 870 (15.8) 6729 (126.7) 2335.1 (44)    Urine (mL/kg/hr) 875 (0.7) 2175 (1.7) 1430 (2.7)    Drains  3000 845    Stool 0 0     Total Output 875 5175 2275    Net -5 +1554 +60.1           Stool Occurrence 0 x 1 x           Physical Exam   Constitutional: She appears well-developed. No distress.   HENT:   Head: Normocephalic and atraumatic.   Eyes: EOM are normal. Pupils are equal, round, and reactive to light.   Neck: Neck supple. No tracheal deviation present.   Cardiovascular: Normal rate, regular rhythm and intact distal pulses.   Pulmonary/Chest: Effort normal. No respiratory distress.   Abdominal: Soft. She exhibits no distension. There is tenderness (appropriate).   2 RLQ VALORIE drains in place   Musculoskeletal: She exhibits no edema.   Neurological: She is alert.   Skin: Skin is warm and dry.   Psychiatric: She has a normal mood and affect.       Laboratory:  Immunosuppressants         Stop Route Frequency     mycophenolate mofetil 200 mg/mL suspension 1,000 mg      -- Oral 2 times daily     tacrolimus (PROGRAF) 1 mg/mL oral syringe      -- Oral 2 times daily        CBC:   Recent Labs   Lab  10/05/18   1507   WBC  5.79   RBC  3.27*   HGB  10.1*   HCT  29.4*   PLT  51*    MCV  90   MCH  30.9   MCHC  34.4     CMP:   Recent Labs   Lab  10/05/18   0710   10/05/18   1406  10/05/18   1507   GLU  216*   --    --    --    CALCIUM  8.2*   --    --    --    ALBUMIN  2.7*   --    --    --    PROT  4.4*   --    --    --    NA  132*   --    --    --    K  4.4   --   2.7*   --    CO2  20*   --    --    --    CL  99   --    --    --    BUN  21   --    --    --    CREATININE  0.6   --    --    --    ALKPHOS  176*   --    --    --    ALT  3,353*   --    --    --    AST  4,894*   < >   --   6,709*   BILITOT  4.5*   --    --    --     < > = values in this interval not displayed.     Coagulation:   Recent Labs   Lab  10/05/18   0710   10/05/18   1507   INR  1.7*   < >  2.0*   APTT  32.9*   --    --     < > = values in this interval not displayed.     ABGs:   Recent Labs   Lab  10/05/18   1521   PH  7.387   PCO2  36.9   HCO3  22.2*   POCSATURATED  100   BE  -3     Labs within the past 24 hours have been reviewed.    Diagnostic Results:  I have personally reviewed all pertinent imaging studies over the past 24hr

## 2018-10-05 NOTE — PROGRESS NOTES
INTRA OP CRRT NOTES    CRRT started via right femoral catheter as arterial line and right ij catheter as the venous line. Both with good flows. Orders implemented as per anesth. K bath-3.0, Na- 130, Hco3- 25 See flow sheet.    0445H - Arterial perfusion ongoing. Radha MONTGOMERY received patient care. Report given.

## 2018-10-05 NOTE — CARE UPDATE
Report received from Anesthesia. Pt transported to SICU [id number] with portable telemetry and Ambubag in use. Pt connected to ICU monitor and Ventilator. Dr. Braxton called and made aware of patient arrival. New orders received and implemented. Pt assessed, immediate needs met. Family brought to bedside, updated on the patient's current condition and PoC for remainder of shift. Family also given ICU Welcome packet and educated on visiting hours. All questions answered, emotional support provided.     Admit Skin Note: CDI - heels, elbows, and sacrum w/o redness or breakdown. Midline chevron incision covered with telfa - dsg CDI. VALORIE drain x2 covered with telfa - dsg CDI.

## 2018-10-06 ENCOUNTER — ANESTHESIA (OUTPATIENT)
Dept: SURGERY | Facility: HOSPITAL | Age: 68
DRG: 005 | End: 2018-10-06
Payer: COMMERCIAL

## 2018-10-06 ENCOUNTER — ANESTHESIA EVENT (OUTPATIENT)
Dept: SURGERY | Facility: HOSPITAL | Age: 68
DRG: 005 | End: 2018-10-06
Payer: COMMERCIAL

## 2018-10-06 LAB
25(OH)D3+25(OH)D2 SERPL-MCNC: 4 NG/ML
ALBUMIN SERPL BCP-MCNC: 3 G/DL
ALBUMIN SERPL BCP-MCNC: 3.1 G/DL
ALBUMIN SERPL BCP-MCNC: 3.2 G/DL
ALBUMIN SERPL BCP-MCNC: 3.2 G/DL
ALLENS TEST: ABNORMAL
ALP SERPL-CCNC: 133 U/L
ALP SERPL-CCNC: 136 U/L
ALP SERPL-CCNC: 139 U/L
ALP SERPL-CCNC: 140 U/L
ALT SERPL W/O P-5'-P-CCNC: 1895 U/L
ALT SERPL W/O P-5'-P-CCNC: 2210 U/L
ALT SERPL W/O P-5'-P-CCNC: 2477 U/L
ALT SERPL W/O P-5'-P-CCNC: 2538 U/L
ANION GAP SERPL CALC-SCNC: 12 MMOL/L
ANION GAP SERPL CALC-SCNC: 8 MMOL/L
ANION GAP SERPL CALC-SCNC: 9 MMOL/L
ANION GAP SERPL CALC-SCNC: 9 MMOL/L
APPEARANCE FLD: NORMAL
APTT BLDCRRT: 32.1 SEC
APTT BLDCRRT: 42.4 SEC
AST SERPL-CCNC: 2211 U/L
AST SERPL-CCNC: 2804 U/L
AST SERPL-CCNC: 3622 U/L
AST SERPL-CCNC: 4283 U/L
BACTERIA UR CULT: NORMAL
BASOPHILS # BLD AUTO: 0 K/UL
BASOPHILS # BLD AUTO: 0.01 K/UL
BASOPHILS NFR BLD: 0 %
BASOPHILS NFR BLD: 0.1 %
BILIRUB DIRECT SERPL-MCNC: 9 MG/DL
BILIRUB FLD-MCNC: 16.7 MG/DL
BILIRUB SERPL-MCNC: 12.3 MG/DL
BILIRUB SERPL-MCNC: 12.6 MG/DL
BILIRUB SERPL-MCNC: 13.4 MG/DL
BILIRUB SERPL-MCNC: 14.3 MG/DL
BLD PROD TYP BPU: NORMAL
BLD PROD TYP BPU: NORMAL
BLOOD UNIT EXPIRATION DATE: NORMAL
BLOOD UNIT EXPIRATION DATE: NORMAL
BLOOD UNIT TYPE CODE: 6200
BLOOD UNIT TYPE CODE: 8400
BLOOD UNIT TYPE: NORMAL
BLOOD UNIT TYPE: NORMAL
BODY FLD TYPE: NORMAL
BODY FLUID SOURCE, BILIRUBIN: NORMAL
BUN SERPL-MCNC: 37 MG/DL
BUN SERPL-MCNC: 38 MG/DL
CALCIUM SERPL-MCNC: 7.3 MG/DL
CALCIUM SERPL-MCNC: 7.6 MG/DL
CALCIUM SERPL-MCNC: 7.7 MG/DL
CALCIUM SERPL-MCNC: 7.8 MG/DL
CHLORIDE SERPL-SCNC: 100 MMOL/L
CHLORIDE SERPL-SCNC: 101 MMOL/L
CHLORIDE SERPL-SCNC: 102 MMOL/L
CHLORIDE SERPL-SCNC: 102 MMOL/L
CO2 SERPL-SCNC: 19 MMOL/L
CO2 SERPL-SCNC: 20 MMOL/L
CO2 SERPL-SCNC: 21 MMOL/L
CO2 SERPL-SCNC: 22 MMOL/L
CODING SYSTEM: NORMAL
CODING SYSTEM: NORMAL
COLOR FLD: NORMAL
CREAT SERPL-MCNC: 0.8 MG/DL
CREAT SERPL-MCNC: 0.9 MG/DL
CREAT SERPL-MCNC: 1 MG/DL
CREAT SERPL-MCNC: 1.1 MG/DL
DELSYS: ABNORMAL
DIFFERENTIAL METHOD: ABNORMAL
DISPENSE STATUS: NORMAL
DISPENSE STATUS: NORMAL
EOSINOPHIL # BLD AUTO: 0 K/UL
EOSINOPHIL NFR BLD: 0 %
ERYTHROCYTE [DISTWIDTH] IN BLOOD BY AUTOMATED COUNT: 15.5 %
ERYTHROCYTE [DISTWIDTH] IN BLOOD BY AUTOMATED COUNT: 15.7 %
ERYTHROCYTE [DISTWIDTH] IN BLOOD BY AUTOMATED COUNT: 15.8 %
ERYTHROCYTE [DISTWIDTH] IN BLOOD BY AUTOMATED COUNT: 15.9 %
ERYTHROCYTE [DISTWIDTH] IN BLOOD BY AUTOMATED COUNT: 15.9 %
ERYTHROCYTE [SEDIMENTATION RATE] IN BLOOD BY WESTERGREN METHOD: 24 MM/H
ERYTHROCYTE [SEDIMENTATION RATE] IN BLOOD BY WESTERGREN METHOD: 24 MM/H
EST. GFR  (AFRICAN AMERICAN): 59.2 ML/MIN/1.73 M^2
EST. GFR  (AFRICAN AMERICAN): >60 ML/MIN/1.73 M^2
EST. GFR  (NON AFRICAN AMERICAN): 51.3 ML/MIN/1.73 M^2
EST. GFR  (NON AFRICAN AMERICAN): 57.6 ML/MIN/1.73 M^2
EST. GFR  (NON AFRICAN AMERICAN): >60 ML/MIN/1.73 M^2
EST. GFR  (NON AFRICAN AMERICAN): >60 ML/MIN/1.73 M^2
FIBRINOGEN PPP-MCNC: 227 MG/DL
FIO2: 100
FIO2: 100
FIO2: 40
FLOW: 8
GGT SERPL-CCNC: 165 U/L
GLUCOSE SERPL-MCNC: 115 MG/DL (ref 70–110)
GLUCOSE SERPL-MCNC: 119 MG/DL (ref 70–110)
GLUCOSE SERPL-MCNC: 124 MG/DL (ref 70–110)
GLUCOSE SERPL-MCNC: 149 MG/DL
GLUCOSE SERPL-MCNC: 160 MG/DL (ref 70–110)
GLUCOSE SERPL-MCNC: 178 MG/DL
GLUCOSE SERPL-MCNC: 180 MG/DL (ref 70–110)
GLUCOSE SERPL-MCNC: 181 MG/DL (ref 70–110)
GLUCOSE SERPL-MCNC: 201 MG/DL (ref 70–110)
GLUCOSE SERPL-MCNC: 203 MG/DL
GLUCOSE SERPL-MCNC: 206 MG/DL
GRAM STN SPEC: NORMAL
GRAM STN SPEC: NORMAL
HCO3 UR-SCNC: 22.6 MMOL/L (ref 24–28)
HCO3 UR-SCNC: 22.7 MMOL/L (ref 24–28)
HCO3 UR-SCNC: 23.1 MMOL/L (ref 24–28)
HCO3 UR-SCNC: 23.5 MMOL/L (ref 24–28)
HCO3 UR-SCNC: 25.1 MMOL/L (ref 24–28)
HCO3 UR-SCNC: 25.4 MMOL/L (ref 24–28)
HCO3 UR-SCNC: 25.6 MMOL/L (ref 24–28)
HCO3 UR-SCNC: 26.6 MMOL/L (ref 24–28)
HCO3 UR-SCNC: 28.2 MMOL/L (ref 24–28)
HCO3 UR-SCNC: 28.5 MMOL/L (ref 24–28)
HCO3 UR-SCNC: 38.3 MMOL/L (ref 24–28)
HCT VFR BLD AUTO: 15 %
HCT VFR BLD AUTO: 24.7 %
HCT VFR BLD AUTO: 25.6 %
HCT VFR BLD AUTO: 26 %
HCT VFR BLD AUTO: 31.3 %
HCT VFR BLD CALC: 18 %PCV (ref 36–54)
HCT VFR BLD CALC: 23 %PCV (ref 36–54)
HCT VFR BLD CALC: 23 %PCV (ref 36–54)
HCT VFR BLD CALC: 24 %PCV (ref 36–54)
HCT VFR BLD CALC: 25 %PCV (ref 36–54)
HCT VFR BLD CALC: 26 %PCV (ref 36–54)
HCT VFR BLD CALC: 27 %PCV (ref 36–54)
HGB BLD-MCNC: 10.7 G/DL
HGB BLD-MCNC: 5 G/DL
HGB BLD-MCNC: 8.4 G/DL
HGB BLD-MCNC: 8.5 G/DL
HGB BLD-MCNC: 9 G/DL
IMM GRANULOCYTES # BLD AUTO: 0.02 K/UL
IMM GRANULOCYTES # BLD AUTO: 0.03 K/UL
IMM GRANULOCYTES # BLD AUTO: 0.04 K/UL
IMM GRANULOCYTES # BLD AUTO: 0.05 K/UL
IMM GRANULOCYTES # BLD AUTO: 0.07 K/UL
IMM GRANULOCYTES NFR BLD AUTO: 0.4 %
IMM GRANULOCYTES NFR BLD AUTO: 0.5 %
IMM GRANULOCYTES NFR BLD AUTO: 0.6 %
INR PPP: 1.6
INR PPP: 1.9
INR PPP: 2
INR PPP: 2.8
LACTATE SERPL-SCNC: 0.7 MMOL/L
LYMPHOCYTES # BLD AUTO: 0.1 K/UL
LYMPHOCYTES # BLD AUTO: 0.2 K/UL
LYMPHOCYTES NFR BLD: 0.9 %
LYMPHOCYTES NFR BLD: 1.1 %
LYMPHOCYTES NFR BLD: 1.2 %
LYMPHOCYTES NFR BLD: 1.4 %
LYMPHOCYTES NFR BLD: 1.8 %
LYMPHOCYTES NFR FLD MANUAL: 33 %
MAGNESIUM SERPL-MCNC: 1.7 MG/DL
MAGNESIUM SERPL-MCNC: 2.1 MG/DL
MCH RBC QN AUTO: 30 PG
MCH RBC QN AUTO: 30.3 PG
MCH RBC QN AUTO: 30.9 PG
MCH RBC QN AUTO: 31.1 PG
MCH RBC QN AUTO: 31.3 PG
MCHC RBC AUTO-ENTMCNC: 33.2 G/DL
MCHC RBC AUTO-ENTMCNC: 33.3 G/DL
MCHC RBC AUTO-ENTMCNC: 34 G/DL
MCHC RBC AUTO-ENTMCNC: 34.2 G/DL
MCHC RBC AUTO-ENTMCNC: 34.6 G/DL
MCV RBC AUTO: 89 FL
MCV RBC AUTO: 89 FL
MCV RBC AUTO: 91 FL
MCV RBC AUTO: 92 FL
MCV RBC AUTO: 93 FL
MIN VOL: 10.1
MIN VOL: 10.1
MIN VOL: 13.3
MODE: ABNORMAL
MONOCYTES # BLD AUTO: 0.3 K/UL
MONOCYTES # BLD AUTO: 0.3 K/UL
MONOCYTES # BLD AUTO: 0.4 K/UL
MONOCYTES # BLD AUTO: 0.4 K/UL
MONOCYTES # BLD AUTO: 0.5 K/UL
MONOCYTES NFR BLD: 2.9 %
MONOCYTES NFR BLD: 4.4 %
MONOCYTES NFR BLD: 4.5 %
MONOCYTES NFR BLD: 4.7 %
MONOCYTES NFR BLD: 5.6 %
MONOS+MACROS NFR FLD MANUAL: 1 %
NEUTROPHILS # BLD AUTO: 10.7 K/UL
NEUTROPHILS # BLD AUTO: 5.2 K/UL
NEUTROPHILS # BLD AUTO: 7.5 K/UL
NEUTROPHILS # BLD AUTO: 8.8 K/UL
NEUTROPHILS # BLD AUTO: 9.1 K/UL
NEUTROPHILS NFR BLD: 93.1 %
NEUTROPHILS NFR BLD: 93.5 %
NEUTROPHILS NFR BLD: 93.8 %
NEUTROPHILS NFR BLD: 94.1 %
NEUTROPHILS NFR BLD: 94.6 %
NEUTROPHILS NFR FLD MANUAL: 66 %
NRBC BLD-RTO: 0 /100 WBC
NUM UNITS TRANS FFP: NORMAL
PCO2 BLDA: 32.6 MMHG (ref 35–45)
PCO2 BLDA: 34.2 MMHG (ref 35–45)
PCO2 BLDA: 35 MMHG (ref 35–45)
PCO2 BLDA: 37.7 MMHG (ref 35–45)
PCO2 BLDA: 38.3 MMHG (ref 35–45)
PCO2 BLDA: 40.9 MMHG (ref 35–45)
PCO2 BLDA: 43 MMHG (ref 35–45)
PCO2 BLDA: 43.5 MMHG (ref 35–45)
PCO2 BLDA: 44.3 MMHG (ref 35–45)
PCO2 BLDA: 44.6 MMHG (ref 35–45)
PCO2 BLDA: 64.7 MMHG (ref 35–45)
PEEP: 5
PH SMN: 7.2 [PH] (ref 7.35–7.45)
PH SMN: 7.32 [PH] (ref 7.35–7.45)
PH SMN: 7.33 [PH] (ref 7.35–7.45)
PH SMN: 7.35 [PH] (ref 7.35–7.45)
PH SMN: 7.35 [PH] (ref 7.35–7.45)
PH SMN: 7.44 [PH] (ref 7.35–7.45)
PH SMN: 7.47 [PH] (ref 7.35–7.45)
PH SMN: 7.48 [PH] (ref 7.35–7.45)
PH SMN: 7.52 [PH] (ref 7.35–7.45)
PH SMN: 7.53 [PH] (ref 7.35–7.45)
PH SMN: 7.54 [PH] (ref 7.35–7.45)
PHOSPHATE SERPL-MCNC: 2.5 MG/DL
PHOSPHATE SERPL-MCNC: 5 MG/DL
PIP: 20
PIP: 21
PLATELET # BLD AUTO: 21 K/UL
PLATELET # BLD AUTO: 37 K/UL
PLATELET # BLD AUTO: 38 K/UL
PLATELET # BLD AUTO: 41 K/UL
PLATELET # BLD AUTO: 70 K/UL
PLATELET BLD QL SMEAR: ABNORMAL
PLATELET BLD QL SMEAR: ABNORMAL
PMV BLD AUTO: 10.4 FL
PMV BLD AUTO: 10.9 FL
PMV BLD AUTO: 11.1 FL
PMV BLD AUTO: 11.2 FL
PMV BLD AUTO: 11.8 FL
PO2 BLDA: 134 MMHG (ref 80–100)
PO2 BLDA: 145 MMHG (ref 80–100)
PO2 BLDA: 150 MMHG (ref 80–100)
PO2 BLDA: 158 MMHG (ref 80–100)
PO2 BLDA: 164 MMHG (ref 80–100)
PO2 BLDA: 171 MMHG (ref 80–100)
PO2 BLDA: 182 MMHG (ref 80–100)
PO2 BLDA: 376 MMHG (ref 80–100)
PO2 BLDA: 49 MMHG (ref 80–100)
PO2 BLDA: 500 MMHG (ref 80–100)
PO2 BLDA: 61 MMHG (ref 40–60)
POC BE: -2 MMOL/L
POC BE: -3 MMOL/L
POC BE: 1 MMOL/L
POC BE: 16 MMOL/L
POC BE: 2 MMOL/L
POC BE: 4 MMOL/L
POC BE: 5 MMOL/L
POC BE: 5 MMOL/L
POC IONIZED CALCIUM: 0.93 MMOL/L (ref 1.06–1.42)
POC IONIZED CALCIUM: 0.94 MMOL/L (ref 1.06–1.42)
POC IONIZED CALCIUM: 0.97 MMOL/L (ref 1.06–1.42)
POC IONIZED CALCIUM: 0.98 MMOL/L (ref 1.06–1.42)
POC IONIZED CALCIUM: 1.04 MMOL/L (ref 1.06–1.42)
POC IONIZED CALCIUM: 1.04 MMOL/L (ref 1.06–1.42)
POC IONIZED CALCIUM: 1.17 MMOL/L (ref 1.06–1.42)
POC SATURATED O2: 100 % (ref 95–100)
POC SATURATED O2: 74 % (ref 95–100)
POC SATURATED O2: 90 % (ref 95–100)
POC SATURATED O2: 99 % (ref 95–100)
POC TCO2: 24 MMOL/L (ref 23–27)
POC TCO2: 24 MMOL/L (ref 23–27)
POC TCO2: 24 MMOL/L (ref 24–29)
POC TCO2: 25 MMOL/L (ref 23–27)
POC TCO2: 27 MMOL/L (ref 23–27)
POC TCO2: 28 MMOL/L (ref 23–27)
POC TCO2: 29 MMOL/L (ref 23–27)
POC TCO2: 30 MMOL/L (ref 23–27)
POC TCO2: 40 MMOL/L (ref 23–27)
POCT GLUCOSE: 113 MG/DL (ref 70–110)
POCT GLUCOSE: 117 MG/DL (ref 70–110)
POCT GLUCOSE: 135 MG/DL (ref 70–110)
POCT GLUCOSE: 146 MG/DL (ref 70–110)
POCT GLUCOSE: 149 MG/DL (ref 70–110)
POCT GLUCOSE: 158 MG/DL (ref 70–110)
POCT GLUCOSE: 161 MG/DL (ref 70–110)
POCT GLUCOSE: 162 MG/DL (ref 70–110)
POCT GLUCOSE: 166 MG/DL (ref 70–110)
POCT GLUCOSE: 167 MG/DL (ref 70–110)
POCT GLUCOSE: 169 MG/DL (ref 70–110)
POCT GLUCOSE: 173 MG/DL (ref 70–110)
POCT GLUCOSE: 190 MG/DL (ref 70–110)
POCT GLUCOSE: 194 MG/DL (ref 70–110)
POCT GLUCOSE: 201 MG/DL (ref 70–110)
POCT GLUCOSE: 206 MG/DL (ref 70–110)
POCT GLUCOSE: 208 MG/DL (ref 70–110)
POCT GLUCOSE: 212 MG/DL (ref 70–110)
POCT GLUCOSE: 224 MG/DL (ref 70–110)
POCT GLUCOSE: 227 MG/DL (ref 70–110)
POCT GLUCOSE: 238 MG/DL (ref 70–110)
POCT GLUCOSE: 275 MG/DL (ref 70–110)
POCT GLUCOSE: 277 MG/DL (ref 70–110)
POCT GLUCOSE: 88 MG/DL (ref 70–110)
POCT GLUCOSE: 89 MG/DL (ref 70–110)
POTASSIUM BLD-SCNC: 2.8 MMOL/L (ref 3.5–5.1)
POTASSIUM BLD-SCNC: 2.8 MMOL/L (ref 3.5–5.1)
POTASSIUM BLD-SCNC: 3.3 MMOL/L (ref 3.5–5.1)
POTASSIUM BLD-SCNC: 3.5 MMOL/L (ref 3.5–5.1)
POTASSIUM BLD-SCNC: 4 MMOL/L (ref 3.5–5.1)
POTASSIUM BLD-SCNC: 4.3 MMOL/L (ref 3.5–5.1)
POTASSIUM BLD-SCNC: 4.8 MMOL/L (ref 3.5–5.1)
POTASSIUM SERPL-SCNC: 4.2 MMOL/L
POTASSIUM SERPL-SCNC: 4.4 MMOL/L
POTASSIUM SERPL-SCNC: 4.5 MMOL/L
POTASSIUM SERPL-SCNC: 4.8 MMOL/L
PROT SERPL-MCNC: 4.1 G/DL
PROT SERPL-MCNC: 4.2 G/DL
PROT SERPL-MCNC: 4.3 G/DL
PROT SERPL-MCNC: 4.4 G/DL
PROTHROMBIN TIME: 15.9 SEC
PROTHROMBIN TIME: 18.5 SEC
PROTHROMBIN TIME: 19.2 SEC
PROTHROMBIN TIME: 27.1 SEC
PS: 5
RBC # BLD AUTO: 1.61 M/UL
RBC # BLD AUTO: 2.77 M/UL
RBC # BLD AUTO: 2.83 M/UL
RBC # BLD AUTO: 2.91 M/UL
RBC # BLD AUTO: 3.42 M/UL
SAMPLE: ABNORMAL
SITE: ABNORMAL
SODIUM BLD-SCNC: 122 MMOL/L (ref 136–145)
SODIUM BLD-SCNC: 131 MMOL/L (ref 136–145)
SODIUM BLD-SCNC: 131 MMOL/L (ref 136–145)
SODIUM BLD-SCNC: 132 MMOL/L (ref 136–145)
SODIUM BLD-SCNC: 132 MMOL/L (ref 136–145)
SODIUM BLD-SCNC: 133 MMOL/L (ref 136–145)
SODIUM BLD-SCNC: 133 MMOL/L (ref 136–145)
SODIUM SERPL-SCNC: 130 MMOL/L
SODIUM SERPL-SCNC: 131 MMOL/L
SODIUM SERPL-SCNC: 132 MMOL/L
SODIUM SERPL-SCNC: 132 MMOL/L
SP02: 100
SP02: 91
SPONT RATE: 30
TACROLIMUS BLD-MCNC: <1.5 NG/ML
TRANS PLATPHERESIS VOL PATIENT: NORMAL ML
VT: 400
VT: 400
WBC # BLD AUTO: 11.35 K/UL
WBC # BLD AUTO: 5.56 K/UL
WBC # BLD AUTO: 8.05 K/UL
WBC # BLD AUTO: 9.34 K/UL
WBC # BLD AUTO: 9.66 K/UL
WBC # FLD: 207 /CU MM

## 2018-10-06 PROCEDURE — 25000003 PHARM REV CODE 250: Performed by: NURSE ANESTHETIST, CERTIFIED REGISTERED

## 2018-10-06 PROCEDURE — S0028 INJECTION, FAMOTIDINE, 20 MG: HCPCS | Performed by: SURGERY

## 2018-10-06 PROCEDURE — 88342 IMHCHEM/IMCYTCHM 1ST ANTB: CPT | Mod: 26,,, | Performed by: PATHOLOGY

## 2018-10-06 PROCEDURE — 25000003 PHARM REV CODE 250: Performed by: SURGERY

## 2018-10-06 PROCEDURE — D9220A PRA ANESTHESIA: Mod: ANES,,, | Performed by: ANESTHESIOLOGY

## 2018-10-06 PROCEDURE — P9021 RED BLOOD CELLS UNIT: HCPCS

## 2018-10-06 PROCEDURE — 82247 BILIRUBIN TOTAL: CPT

## 2018-10-06 PROCEDURE — 85730 THROMBOPLASTIN TIME PARTIAL: CPT | Mod: 91

## 2018-10-06 PROCEDURE — 27100025 HC TUBING, SET FLUID WARMER: Performed by: NURSE ANESTHETIST, CERTIFIED REGISTERED

## 2018-10-06 PROCEDURE — D9220A PRA ANESTHESIA: Mod: CRNA,,, | Performed by: NURSE ANESTHETIST, CERTIFIED REGISTERED

## 2018-10-06 PROCEDURE — 63600175 PHARM REV CODE 636 W HCPCS: Performed by: NURSE ANESTHETIST, CERTIFIED REGISTERED

## 2018-10-06 PROCEDURE — 0FB00ZX EXCISION OF LIVER, OPEN APPROACH, DIAGNOSTIC: ICD-10-PCS | Performed by: TRANSPLANT SURGERY

## 2018-10-06 PROCEDURE — 85610 PROTHROMBIN TIME: CPT | Mod: 91

## 2018-10-06 PROCEDURE — 25000003 PHARM REV CODE 250: Performed by: NURSE PRACTITIONER

## 2018-10-06 PROCEDURE — 87102 FUNGUS ISOLATION CULTURE: CPT

## 2018-10-06 PROCEDURE — 99900017 HC EXTUBATION W/PARAMETERS (STAT)

## 2018-10-06 PROCEDURE — 27201423 OPTIME MED/SURG SUP & DEVICES STERILE SUPPLY: Performed by: TRANSPLANT SURGERY

## 2018-10-06 PROCEDURE — 20000000 HC ICU ROOM

## 2018-10-06 PROCEDURE — 84100 ASSAY OF PHOSPHORUS: CPT | Mod: 91

## 2018-10-06 PROCEDURE — P9017 PLASMA 1 DONOR FRZ W/IN 8 HR: HCPCS

## 2018-10-06 PROCEDURE — 49000 EXPLORATION OF ABDOMEN: CPT | Mod: 78,,, | Performed by: TRANSPLANT SURGERY

## 2018-10-06 PROCEDURE — 63600175 PHARM REV CODE 636 W HCPCS: Performed by: SURGERY

## 2018-10-06 PROCEDURE — 88307 TISSUE EXAM BY PATHOLOGIST: CPT | Mod: 26,,, | Performed by: PATHOLOGY

## 2018-10-06 PROCEDURE — 63600175 PHARM REV CODE 636 W HCPCS: Performed by: ANESTHESIOLOGY

## 2018-10-06 PROCEDURE — 83605 ASSAY OF LACTIC ACID: CPT

## 2018-10-06 PROCEDURE — P9035 PLATELET PHERES LEUKOREDUCED: HCPCS

## 2018-10-06 PROCEDURE — 5A1935Z RESPIRATORY VENTILATION, LESS THAN 24 CONSECUTIVE HOURS: ICD-10-PCS | Performed by: ANESTHESIOLOGY

## 2018-10-06 PROCEDURE — 94761 N-INVAS EAR/PLS OXIMETRY MLT: CPT

## 2018-10-06 PROCEDURE — 31500 INSERT EMERGENCY AIRWAY: CPT | Mod: ,,, | Performed by: STUDENT IN AN ORGANIZED HEALTH CARE EDUCATION/TRAINING PROGRAM

## 2018-10-06 PROCEDURE — P9045 ALBUMIN (HUMAN), 5%, 250 ML: HCPCS | Mod: JG | Performed by: STUDENT IN AN ORGANIZED HEALTH CARE EDUCATION/TRAINING PROGRAM

## 2018-10-06 PROCEDURE — 36000706: Performed by: TRANSPLANT SURGERY

## 2018-10-06 PROCEDURE — 25000003 PHARM REV CODE 250: Performed by: STUDENT IN AN ORGANIZED HEALTH CARE EDUCATION/TRAINING PROGRAM

## 2018-10-06 PROCEDURE — 85730 THROMBOPLASTIN TIME PARTIAL: CPT

## 2018-10-06 PROCEDURE — 85384 FIBRINOGEN ACTIVITY: CPT

## 2018-10-06 PROCEDURE — 27000221 HC OXYGEN, UP TO 24 HOURS

## 2018-10-06 PROCEDURE — 36430 TRANSFUSION BLD/BLD COMPNT: CPT

## 2018-10-06 PROCEDURE — 80053 COMPREHEN METABOLIC PANEL: CPT

## 2018-10-06 PROCEDURE — 85610 PROTHROMBIN TIME: CPT

## 2018-10-06 PROCEDURE — 63600175 PHARM REV CODE 636 W HCPCS: Performed by: TRANSPLANT SURGERY

## 2018-10-06 PROCEDURE — 0BH17EZ INSERTION OF ENDOTRACHEAL AIRWAY INTO TRACHEA, VIA NATURAL OR ARTIFICIAL OPENING: ICD-10-PCS | Performed by: ANESTHESIOLOGY

## 2018-10-06 PROCEDURE — 37799 UNLISTED PX VASCULAR SURGERY: CPT

## 2018-10-06 PROCEDURE — 83735 ASSAY OF MAGNESIUM: CPT | Mod: 91

## 2018-10-06 PROCEDURE — 83735 ASSAY OF MAGNESIUM: CPT

## 2018-10-06 PROCEDURE — 47001 NDL BIOPSY LVR TM OTH MAJ PX: CPT | Mod: ,,, | Performed by: TRANSPLANT SURGERY

## 2018-10-06 PROCEDURE — 37000009 HC ANESTHESIA EA ADD 15 MINS: Performed by: TRANSPLANT SURGERY

## 2018-10-06 PROCEDURE — 82977 ASSAY OF GGT: CPT

## 2018-10-06 PROCEDURE — 99233 SBSQ HOSP IP/OBS HIGH 50: CPT | Mod: ,,, | Performed by: NURSE PRACTITIONER

## 2018-10-06 PROCEDURE — 37000008 HC ANESTHESIA 1ST 15 MINUTES: Performed by: TRANSPLANT SURGERY

## 2018-10-06 PROCEDURE — 99900022

## 2018-10-06 PROCEDURE — 36000707: Performed by: TRANSPLANT SURGERY

## 2018-10-06 PROCEDURE — 63600175 PHARM REV CODE 636 W HCPCS: Performed by: NURSE PRACTITIONER

## 2018-10-06 PROCEDURE — 87075 CULTR BACTERIA EXCEPT BLOOD: CPT

## 2018-10-06 PROCEDURE — 63600175 PHARM REV CODE 636 W HCPCS

## 2018-10-06 PROCEDURE — 99900026 HC AIRWAY MAINTENANCE (STAT)

## 2018-10-06 PROCEDURE — 84100 ASSAY OF PHOSPHORUS: CPT

## 2018-10-06 PROCEDURE — 63600175 PHARM REV CODE 636 W HCPCS: Mod: JG | Performed by: STUDENT IN AN ORGANIZED HEALTH CARE EDUCATION/TRAINING PROGRAM

## 2018-10-06 PROCEDURE — 80053 COMPREHEN METABOLIC PANEL: CPT | Mod: 91

## 2018-10-06 PROCEDURE — 82803 BLOOD GASES ANY COMBINATION: CPT

## 2018-10-06 PROCEDURE — 25000003 PHARM REV CODE 250: Performed by: ANESTHESIOLOGY

## 2018-10-06 PROCEDURE — 87070 CULTURE OTHR SPECIMN AEROBIC: CPT

## 2018-10-06 PROCEDURE — 82248 BILIRUBIN DIRECT: CPT

## 2018-10-06 PROCEDURE — 94002 VENT MGMT INPAT INIT DAY: CPT

## 2018-10-06 PROCEDURE — 89051 BODY FLUID CELL COUNT: CPT

## 2018-10-06 PROCEDURE — 36415 COLL VENOUS BLD VENIPUNCTURE: CPT

## 2018-10-06 PROCEDURE — 85025 COMPLETE CBC W/AUTO DIFF WBC: CPT | Mod: 91

## 2018-10-06 PROCEDURE — 99900035 HC TECH TIME PER 15 MIN (STAT)

## 2018-10-06 PROCEDURE — 88313 SPECIAL STAINS GROUP 2: CPT | Performed by: PATHOLOGY

## 2018-10-06 PROCEDURE — 80197 ASSAY OF TACROLIMUS: CPT

## 2018-10-06 PROCEDURE — 99233 SBSQ HOSP IP/OBS HIGH 50: CPT | Mod: 24,,, | Performed by: SURGERY

## 2018-10-06 PROCEDURE — 25000003 PHARM REV CODE 250: Performed by: TRANSPLANT SURGERY

## 2018-10-06 PROCEDURE — 87205 SMEAR GRAM STAIN: CPT

## 2018-10-06 RX ORDER — FLUCONAZOLE 200 MG/1
200 TABLET ORAL DAILY
Status: DISCONTINUED | OUTPATIENT
Start: 2018-10-06 | End: 2018-11-16

## 2018-10-06 RX ORDER — SUCCINYLCHOLINE CHLORIDE 20 MG/ML
INJECTION INTRAMUSCULAR; INTRAVENOUS
Status: DISCONTINUED | OUTPATIENT
Start: 2018-10-06 | End: 2018-10-06

## 2018-10-06 RX ORDER — PROPOFOL 10 MG/ML
INJECTION, EMULSION INTRAVENOUS
Status: COMPLETED
Start: 2018-10-06 | End: 2018-10-06

## 2018-10-06 RX ORDER — GLYCOPYRROLATE 0.2 MG/ML
INJECTION INTRAMUSCULAR; INTRAVENOUS
Status: DISCONTINUED | OUTPATIENT
Start: 2018-10-06 | End: 2018-10-06

## 2018-10-06 RX ORDER — ROCURONIUM BROMIDE 10 MG/ML
INJECTION, SOLUTION INTRAVENOUS
Status: DISCONTINUED | OUTPATIENT
Start: 2018-10-06 | End: 2018-10-06

## 2018-10-06 RX ORDER — FENTANYL CITRATE 50 UG/ML
INJECTION, SOLUTION INTRAMUSCULAR; INTRAVENOUS
Status: DISCONTINUED | OUTPATIENT
Start: 2018-10-06 | End: 2018-10-06

## 2018-10-06 RX ORDER — PROPOFOL 10 MG/ML
INJECTION, EMULSION INTRAVENOUS
Status: DISCONTINUED | OUTPATIENT
Start: 2018-10-06 | End: 2018-10-06

## 2018-10-06 RX ORDER — SODIUM CHLORIDE, SODIUM LACTATE, POTASSIUM CHLORIDE, CALCIUM CHLORIDE 600; 310; 30; 20 MG/100ML; MG/100ML; MG/100ML; MG/100ML
INJECTION, SOLUTION INTRAVENOUS CONTINUOUS
Status: DISCONTINUED | OUTPATIENT
Start: 2018-10-06 | End: 2018-10-09

## 2018-10-06 RX ORDER — HYDROCODONE BITARTRATE AND ACETAMINOPHEN 500; 5 MG/1; MG/1
TABLET ORAL
Status: DISCONTINUED | OUTPATIENT
Start: 2018-10-06 | End: 2018-10-08

## 2018-10-06 RX ORDER — ONDANSETRON 2 MG/ML
INJECTION INTRAMUSCULAR; INTRAVENOUS
Status: DISCONTINUED | OUTPATIENT
Start: 2018-10-06 | End: 2018-10-06

## 2018-10-06 RX ORDER — PHENYLEPHRINE HYDROCHLORIDE 10 MG/ML
INJECTION INTRAVENOUS
Status: DISCONTINUED | OUTPATIENT
Start: 2018-10-06 | End: 2018-10-06

## 2018-10-06 RX ORDER — CEFAZOLIN SODIUM 1 G/3ML
INJECTION, POWDER, FOR SOLUTION INTRAMUSCULAR; INTRAVENOUS
Status: DISCONTINUED | OUTPATIENT
Start: 2018-10-06 | End: 2018-10-06

## 2018-10-06 RX ORDER — OXYCODONE HYDROCHLORIDE 5 MG/1
5 TABLET ORAL EVERY 4 HOURS PRN
Status: DISCONTINUED | OUTPATIENT
Start: 2018-10-06 | End: 2018-11-15

## 2018-10-06 RX ORDER — SUCCINYLCHOLINE CHLORIDE 20 MG/ML
INJECTION INTRAMUSCULAR; INTRAVENOUS
Status: COMPLETED
Start: 2018-10-06 | End: 2018-10-06

## 2018-10-06 RX ORDER — MIDAZOLAM HYDROCHLORIDE 1 MG/ML
INJECTION, SOLUTION INTRAMUSCULAR; INTRAVENOUS
Status: DISCONTINUED | OUTPATIENT
Start: 2018-10-06 | End: 2018-10-06

## 2018-10-06 RX ORDER — HYDROCODONE BITARTRATE AND ACETAMINOPHEN 500; 5 MG/1; MG/1
TABLET ORAL
Status: DISCONTINUED | OUTPATIENT
Start: 2018-10-06 | End: 2018-10-06

## 2018-10-06 RX ORDER — ALBUMIN HUMAN 50 G/1000ML
12.5 SOLUTION INTRAVENOUS ONCE
Status: COMPLETED | OUTPATIENT
Start: 2018-10-06 | End: 2018-10-06

## 2018-10-06 RX ORDER — VANCOMYCIN HCL IN 5 % DEXTROSE 1G/250ML
1000 PLASTIC BAG, INJECTION (ML) INTRAVENOUS ONCE
Status: COMPLETED | OUTPATIENT
Start: 2018-10-06 | End: 2018-10-06

## 2018-10-06 RX ORDER — ACETAMINOPHEN 325 MG/1
650 TABLET ORAL EVERY 8 HOURS
Status: DISCONTINUED | OUTPATIENT
Start: 2018-10-06 | End: 2018-10-08

## 2018-10-06 RX ORDER — NEOSTIGMINE METHYLSULFATE 1 MG/ML
INJECTION, SOLUTION INTRAVENOUS
Status: DISCONTINUED | OUTPATIENT
Start: 2018-10-06 | End: 2018-10-06

## 2018-10-06 RX ORDER — ROCURONIUM BROMIDE 10 MG/ML
INJECTION, SOLUTION INTRAVENOUS
Status: DISPENSED
Start: 2018-10-06 | End: 2018-10-07

## 2018-10-06 RX ORDER — LIDOCAINE HCL/PF 100 MG/5ML
SYRINGE (ML) INTRAVENOUS
Status: DISCONTINUED | OUTPATIENT
Start: 2018-10-06 | End: 2018-10-06

## 2018-10-06 RX ORDER — HEPARIN SODIUM 1000 [USP'U]/ML
INJECTION, SOLUTION INTRAVENOUS; SUBCUTANEOUS
Status: DISCONTINUED | OUTPATIENT
Start: 2018-10-06 | End: 2018-10-06 | Stop reason: HOSPADM

## 2018-10-06 RX ORDER — ETOMIDATE 2 MG/ML
INJECTION INTRAVENOUS
Status: COMPLETED
Start: 2018-10-06 | End: 2018-10-06

## 2018-10-06 RX ADMIN — ROCURONIUM BROMIDE 25 MG: 10 INJECTION, SOLUTION INTRAVENOUS at 02:10

## 2018-10-06 RX ADMIN — Medication 1000 MG: at 09:10

## 2018-10-06 RX ADMIN — PIPERACILLIN AND TAZOBACTAM 4.5 G: 4; .5 INJECTION, POWDER, LYOPHILIZED, FOR SOLUTION INTRAVENOUS; PARENTERAL at 08:10

## 2018-10-06 RX ADMIN — SODIUM CHLORIDE, SODIUM GLUCONATE, SODIUM ACETATE, POTASSIUM CHLORIDE, MAGNESIUM CHLORIDE, SODIUM PHOSPHATE, DIBASIC, AND POTASSIUM PHOSPHATE: .53; .5; .37; .037; .03; .012; .00082 INJECTION, SOLUTION INTRAVENOUS at 01:10

## 2018-10-06 RX ADMIN — Medication 1000 MG: at 11:10

## 2018-10-06 RX ADMIN — PIPERACILLIN AND TAZOBACTAM 4.5 G: 4; .5 INJECTION, POWDER, LYOPHILIZED, FOR SOLUTION INTRAVENOUS; PARENTERAL at 05:10

## 2018-10-06 RX ADMIN — Medication 1000 MG: at 10:10

## 2018-10-06 RX ADMIN — SODIUM CHLORIDE, SODIUM LACTATE, POTASSIUM CHLORIDE, AND CALCIUM CHLORIDE 500 ML: .6; .31; .03; .02 INJECTION, SOLUTION INTRAVENOUS at 08:10

## 2018-10-06 RX ADMIN — ALBUMIN HUMAN 12.5 G: 0.05 INJECTION, SOLUTION INTRAVENOUS at 03:10

## 2018-10-06 RX ADMIN — FAMOTIDINE 20 MG: 10 INJECTION, SOLUTION INTRAVENOUS at 08:10

## 2018-10-06 RX ADMIN — LIDOCAINE HYDROCHLORIDE 50 MG: 20 INJECTION, SOLUTION INTRAVENOUS at 01:10

## 2018-10-06 RX ADMIN — PHYTONADIONE 10 MG: 10 INJECTION, EMULSION INTRAMUSCULAR; INTRAVENOUS; SUBCUTANEOUS at 06:10

## 2018-10-06 RX ADMIN — CEFAZOLIN 2 G: 330 INJECTION, POWDER, FOR SOLUTION INTRAMUSCULAR; INTRAVENOUS at 01:10

## 2018-10-06 RX ADMIN — SODIUM CHLORIDE 0.3 MCG/KG/MIN: 9 INJECTION, SOLUTION INTRAVENOUS at 02:10

## 2018-10-06 RX ADMIN — METHYLPREDNISOLONE SODIUM SUCCINATE 100 MG: 125 INJECTION, POWDER, FOR SOLUTION INTRAMUSCULAR; INTRAVENOUS at 09:10

## 2018-10-06 RX ADMIN — PHENYLEPHRINE HYDROCHLORIDE 200 MCG: 10 INJECTION INTRAVENOUS at 02:10

## 2018-10-06 RX ADMIN — SUCCINYLCHOLINE CHLORIDE 20 MG: 20 INJECTION, SOLUTION INTRAMUSCULAR; INTRAVENOUS at 04:10

## 2018-10-06 RX ADMIN — MIDAZOLAM HYDROCHLORIDE 2 MG: 1 INJECTION, SOLUTION INTRAMUSCULAR; INTRAVENOUS at 01:10

## 2018-10-06 RX ADMIN — PHENYLEPHRINE HYDROCHLORIDE 200 MCG: 10 INJECTION INTRAVENOUS at 01:10

## 2018-10-06 RX ADMIN — SULFAMETHOXAZOLE AND TRIMETHOPRIM 1 TABLET: 400; 80 TABLET ORAL at 08:10

## 2018-10-06 RX ADMIN — SODIUM PHOSPHATE, MONOBASIC, MONOHYDRATE 15 MMOL: 276; 142 INJECTION, SOLUTION INTRAVENOUS at 07:10

## 2018-10-06 RX ADMIN — ENTECAVIR 0.5 MG: 0.5 TABLET ORAL at 08:10

## 2018-10-06 RX ADMIN — SODIUM CHLORIDE 4 UNITS/HR: 9 INJECTION, SOLUTION INTRAVENOUS at 12:10

## 2018-10-06 RX ADMIN — ONDANSETRON 4 MG: 2 INJECTION INTRAMUSCULAR; INTRAVENOUS at 03:10

## 2018-10-06 RX ADMIN — METHYLPREDNISOLONE SODIUM SUCCINATE 100 MG: 125 INJECTION, POWDER, FOR SOLUTION INTRAMUSCULAR; INTRAVENOUS at 08:10

## 2018-10-06 RX ADMIN — SODIUM CHLORIDE, SODIUM GLUCONATE, SODIUM ACETATE, POTASSIUM CHLORIDE, MAGNESIUM CHLORIDE, SODIUM PHOSPHATE, DIBASIC, AND POTASSIUM PHOSPHATE: .53; .5; .37; .037; .03; .012; .00082 INJECTION, SOLUTION INTRAVENOUS at 02:10

## 2018-10-06 RX ADMIN — HEPARIN SODIUM 5000 UNITS: 5000 INJECTION, SOLUTION INTRAVENOUS; SUBCUTANEOUS at 05:10

## 2018-10-06 RX ADMIN — GLYCOPYRROLATE 0.4 MG: 0.2 INJECTION, SOLUTION INTRAMUSCULAR; INTRAVENOUS at 03:10

## 2018-10-06 RX ADMIN — ROCURONIUM BROMIDE 5 MG: 10 INJECTION, SOLUTION INTRAVENOUS at 01:10

## 2018-10-06 RX ADMIN — FLUCONAZOLE 200 MG: 200 TABLET ORAL at 08:10

## 2018-10-06 RX ADMIN — OXYCODONE HYDROCHLORIDE 5 MG: 5 TABLET ORAL at 09:10

## 2018-10-06 RX ADMIN — FENTANYL CITRATE 50 MCG: 50 INJECTION, SOLUTION INTRAMUSCULAR; INTRAVENOUS at 03:10

## 2018-10-06 RX ADMIN — HEPATITIS B IMMUNE GLOBULIN (HUMAN) 9999.91 UNITS: 312 INJECTION, SOLUTION INTRAMUSCULAR; INTRAVENOUS at 11:10

## 2018-10-06 RX ADMIN — SUCCINYLCHOLINE CHLORIDE 120 MG: 20 INJECTION, SOLUTION INTRAMUSCULAR; INTRAVENOUS at 01:10

## 2018-10-06 RX ADMIN — NEOSTIGMINE METHYLSULFATE 4 MG: 1 INJECTION INTRAVENOUS at 03:10

## 2018-10-06 RX ADMIN — FENTANYL CITRATE 50 MCG: 50 INJECTION, SOLUTION INTRAMUSCULAR; INTRAVENOUS at 02:10

## 2018-10-06 RX ADMIN — SODIUM CHLORIDE, SODIUM LACTATE, POTASSIUM CHLORIDE, AND CALCIUM CHLORIDE: .6; .31; .03; .02 INJECTION, SOLUTION INTRAVENOUS at 08:10

## 2018-10-06 RX ADMIN — ALBUMIN HUMAN 12.5 G: 0.05 INJECTION, SOLUTION INTRAVENOUS at 07:10

## 2018-10-06 RX ADMIN — MAGNESIUM SULFATE IN WATER 2 G: 40 INJECTION, SOLUTION INTRAVENOUS at 05:10

## 2018-10-06 RX ADMIN — FAMOTIDINE 20 MG: 10 INJECTION, SOLUTION INTRAVENOUS at 09:10

## 2018-10-06 RX ADMIN — Medication 1 MG: at 08:10

## 2018-10-06 RX ADMIN — PROPOFOL 100 MG: 10 INJECTION, EMULSION INTRAVENOUS at 01:10

## 2018-10-06 NOTE — ASSESSMENT & PLAN NOTE
BG goal 140 - 180   Continue IV insulin infusion protocol  Requires intensive BG monitoring while on protocol     Discharge planning:  MALCOLM

## 2018-10-06 NOTE — PROCEDURES
"Scarlett Reddy is a 68 y.o. female patient s/p liver transplant take back to OR. Arrived in hypoxic hypercapneac respiratory failure.    Temp: 97.7 °F (36.5 °C) (10/06/18 1200)  Pulse: 103 (10/06/18 1613)  Resp: (!) 29 (10/06/18 1613)  BP: 129/63 (10/06/18 1300)  SpO2: (!) 92 % (10/06/18 1613)  Weight: 61.2 kg (134 lb 14.7 oz) (10/06/18 0500)  Height: 5' 3" (160 cm) (10/04/18 2130)       Intubation  Date/Time: 10/6/2018 4:51 PM  Location procedure was performed: [facility] CRITICAL C[facility] SURGERY  Performed by: Angeles Suarez MD  Authorized by: Jak Mendoza Jr., MD   Assisting provider: Jak Mendoza Jr., MD  Pre-operative diagnosis: hypoxic and hypercapneac respiratory failure  Post-operative diagnosis: hypoxic and hypercapneac respiratory failure  Consent Done: Emergent Situation  Indications: respiratory failure  Description of findings: Grade 1   Intubation method: video-assisted  Patient status: sedated  Preoxygenation: mask  Pretreatment medications: none  Sedatives: propofol  Paralytic: succinylcholine  Laryngoscope size: Mac 3  Tube size: 7.0 mm  Tube type: cuffed  Number of attempts: 1  Cricoid pressure: no  Cords visualized: yes  Post-procedure assessment: chest rise,  ETCO2 monitor and CO2 detector  Breath sounds: clear  Cuff inflated: yes  ETT to lip: 21 cm  Tube secured with: ETT bermeo  Complications: No  Estimated blood loss (mL): 0  Specimens: No  Implants: No          Angeles Suarez  10/6/2018  "

## 2018-10-06 NOTE — PROGRESS NOTES
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  Donor Type:    - Brain Death  CDC High Risk:   No  Donor CMV Status:   Donor CMV Status: Positive  Donor HBcAB:   Negative  Donor HCV Status:   Negative  Donor HBV SETH: Negative  Donor HCV SETH: Negative  Whole or Partial: Whole Liver  Biliary Anastomosis: End to End  Arterial Anatomy: Replaced Left Hepatic and Right Hepatic    Subjective:     Interval History:  POD1 Liver Txp  Drain appears bilious this AM, rise in TBili  Taken back to OR, no apparent leak identified  Resp distress on return to floor, re-intubating     Scheduled Meds:   etomidate        propofol        rocuronium        succinylcholine        acetaminophen  650 mg Oral Q8H    entecavir  0.5 mg Oral Daily    ergocalciferol  50,000 Units Oral Q7 Days    famotidine (PF)  20 mg Intravenous Q12H    fluconazole  200 mg Oral Daily    heparin (porcine)  5,000 Units Subcutaneous Q8H    hepatitis B immune globulin (HEPA-RALPH B) IVPB  9,999.912 Units Intravenous Daily    lidocaine (PF) 10 mg/ml (1%)  1 mL Other Once    methylPREDNISolone sodium succinate  100 mg Intravenous Q12H    Followed by    [START ON 10/7/2018] methylPREDNISolone sodium succinate  80 mg Intravenous Q12H    Followed by    [START ON 10/8/2018] methylPREDNISolone sodium succinate  60 mg Intravenous Q12H    Followed by    [START ON 10/9/2018] methylPREDNISolone sodium succinate  40 mg Intravenous Q12H    Followed by    [START ON 10/10/2018] methylPREDNISolone sodium succinate  20 mg Intravenous Q12H    Followed by    [START ON 10/11/2018] predniSONE  20 mg Oral Daily    mycophenolate mofetil  1,000 mg Oral BID    piperacillin-tazobactam (ZOSYN)  IVPB  4.5 g Intravenous Q8H    sulfamethoxazole-trimethoprim 400-80mg  1 tablet Oral Daily    tacrolimus  3 mg Oral BID    [START ON 10/15/2018] valganciclovir 50 mg/ml  450 mg Per NG tube Daily     Continuous Infusions:   hydromorphone in 0.9 % NaCl 6 mg/30 ml      insulin (HUMAN R) infusion (adults) 6 Units/hr (10/06/18 1319)    lactated ringers 100 mL/hr at 10/06/18 1300    phenylephrine Stopped (10/05/18 1500)    vasopressin (PITRESSIN) infusion Stopped (10/05/18 1900)     PRN Meds:sodium chloride, calcium gluconate IVPB, calcium gluconate IVPB, calcium gluconate IVPB, dextrose 50%, dextrose 50%, magnesium sulfate IVPB, magnesium sulfate IVPB, naloxone, ondansetron, oxyCODONE, potassium chloride in water **AND** potassium chloride in water **AND** potassium chloride in water, promethazine (PHENERGAN) IVPB, sodium chloride 0.9%, sodium phosphate IVPB, sodium phosphate IVPB, sodium phosphate IVPB    Review of Systems   Unable to perform ROS: Other     Objective:     Vital Signs (Most Recent):  Temp: 97.7 °F (36.5 °C) (10/06/18 1200)  Pulse: 94 (10/06/18 1300)  Resp: (!) 34 (10/06/18 1300)  BP: 129/63 (10/06/18 1300)  SpO2: 97 % (10/06/18 1300) Vital Signs (24h Range):  Temp:  [97.3 °F (36.3 °C)-99.1 °F (37.3 °C)] 97.7 °F (36.5 °C)  Pulse:  [80-98] 94  Resp:  [11-45] 34  SpO2:  [93 %-100 %] 97 %  BP: ()/(47-63) 129/63     Weight: 61.2 kg (134 lb 14.7 oz)  Body mass index is 23.9 kg/m².    Intake/Output - Last 3 Shifts       10/04 0700 - 10/05 0659 10/05 0700 - 10/06 0659 10/06 0700 - 10/07 0659    P.O. 400 600     I.V. (mL/kg) 4750 (89.5) 4256 (69.5) 1100 (18)    Blood 1579  810    Other 600      NG/GT  200     IV Piggyback  1152 500    Total Intake(mL/kg) 7329 (138) 6208 (101.4) 2410 (39.4)    Urine (mL/kg/hr) 2175 (1.7) 1928 (1.3) 445 (0.8)    Drains 3000 2003 677    Other 265      Stool 0      Blood   50    Total Output 5440 3931 1172    Net +1889 +2277 +1238           Stool Occurrence 1 x             Physical Exam   Constitutional: She appears well-developed. No distress.   HENT:   Head: Normocephalic and atraumatic.   Eyes: EOM are normal. Pupils are equal, round, and reactive to light.   Neck: Neck supple. No tracheal deviation present.   Cardiovascular: Normal rate, regular rhythm and intact distal pulses.   Pulmonary/Chest: Effort normal. No respiratory distress.   Abdominal: Soft. She exhibits no distension. There is tenderness (appropriate).   2 RLQ VALORIE drains in place   Musculoskeletal: She exhibits no edema.   Neurological: She is alert.   Skin: Skin is warm and dry.   Psychiatric: She has a normal mood and affect.       Laboratory:  Immunosuppressants         Stop Route Frequency     tacrolimus (PROGRAF) 1 mg/mL oral syringe      -- Oral 2 times daily     mycophenolate mofetil 200 mg/mL suspension 1,000 mg      -- Oral 2 times daily        CBC:   Recent Labs   Lab  10/06/18   0753   WBC  9.66   RBC  2.77*   HGB  8.4*   HCT  24.7*   PLT  37*   MCV  89   MCH  30.3   MCHC  34.0     CMP:   Recent Labs   Lab  10/06/18   0803   GLU  203*   CALCIUM  7.6*   ALBUMIN  3.2*   PROT  4.2*   NA  130*   K  4.4   CO2  20*   CL  101   BUN  37*   CREATININE  1.0   ALKPHOS  133   ALT  2,210*   AST  2,804*   BILITOT  14.3*     Coagulation:   Recent Labs   Lab  10/06/18   0318   10/06/18   0821   INR  2.0*   < >  1.9*   APTT  42.4*   --    --     < > = values in this interval not displayed.     ABGs:   Recent Labs   Lab  10/05/18   2104   PH  7.388   PCO2  39.1   HCO3  23.6*   POCSATURATED  93*   BE  -1     Labs within the past 24 hours have been reviewed.    Diagnostic Results:  I have personally reviewed all pertinent imaging studies over the past 24hr    Assessment/Plan:   Scarlett Reddy is a 68 y.o. female     Neuro:   - Sedation: Off currently  - Pain control: dPCA but not currently using  - Continue to monitor, likely needs PO once extubated and stable     Pulmonary:   - Re-intubated  - Q1hr ABG until stable  -  Wean to extubate  - AM CXR     Cardiac:  - Drips: Vaso/jimbo off  - Continue to monitor     Renal:   - BUN/CR: 38/1.1  - Trend BMP     Infectious Disease:   - WBC 11.35  - Trend WBC  - Fluconazole, Vanc, Zosyn  - MMF, Prograf  - Bactrim, Valganciclovir     Hematology/Oncology:  - H&H 10.7/31.3 s/p 1U in the OR  - INR 1.6  - Plt 70  - Trend CBC     Endocrine:  - Insulin gtt  - Monitor BG     Fluids/Electrolytes/Nutrition/GI:   - NPO  - Will need OGT if remains intubated  - Trend CMP  - Replace Lytes PRN   - Continue Pepcid BID  - Zofran for nausea     Dispo:  Continue ICU care    The patients clinical status was discussed at multidisplinary rounds, involving transplant surgery, transplant medicine, pharmacy, nursing, nutrition, and social work.    Eitan Marvin MD  Liver Transplant  [facility]

## 2018-10-06 NOTE — SUBJECTIVE & OBJECTIVE
Interval History/Significant Events: Patient tolerated extubation yesterday well. Transitioned to NC and now stable on room air. Pain controlled with PCA. Albumin 5% 250cc overnight once for decreased UOP with good response     Follow-up For: Procedure(s) (LRB):  TRANSPLANT, LIVER (N/A)    Post-Operative Day: 2 Days Post-Op    Objective:     Vital Signs (Most Recent):  Temp: 99.1 °F (37.3 °C) (10/06/18 0300)  Pulse: 91 (10/06/18 0630)  Resp: 13 (10/06/18 0630)  BP: (!) 86/51 (10/06/18 0600)  SpO2: 98 % (10/06/18 0630) Vital Signs (24h Range):  Temp:  [97.3 °F (36.3 °C)-99.1 °F (37.3 °C)] 99.1 °F (37.3 °C)  Pulse:  [] 91  Resp:  [10-45] 13  SpO2:  [96 %-100 %] 98 %  BP: ()/(47-60) 86/51     Weight: 61.2 kg (134 lb 14.7 oz)  Body mass index is 23.9 kg/m².      Intake/Output Summary (Last 24 hours) at 10/6/2018 0651  Last data filed at 10/6/2018 0630  Gross per 24 hour   Intake 6207.98 ml   Output 3931 ml   Net 2276.98 ml       Physical Exam   Constitutional: She appears well-developed.   Eyes: EOM are normal.   Neck: Neck supple.   Right IJ CVC in place    Cardiovascular: Normal rate and regular rhythm.   Pulmonary/Chest: Breath sounds normal. Stridor present. She is in respiratory distress.   Extubated, on RA    Abdominal: Soft.   VALORIE drain x 2 in place with s/s output    Genitourinary:   Genitourinary Comments: Right femoral art line in place   Bower in place   Neurological: She is alert.   Skin: Skin is warm and dry.   Psychiatric: She has a normal mood and affect.           Lines/Drains/Airways     Central Venous Catheter Line                 Percutaneous Central Line Insertion/Assessment - double lumen  10/04/18 2311 right femoral 1 day         Percutaneous Central Line Insertion/Assessment - double lumen  10/05/18 right internal jugular 1 day         Percutaneous Central Line Insertion/Assessment - triple lumen  10/04/18 2311 right internal jugular 1 day         Pulmonary Artery Catheter Assessment   10/04/18 2311 1 day          Drain                 Closed/Suction Drain 10/05/18 0426 Right Abdomen Bulb 19 Fr. 1 day         Closed/Suction Drain 10/05/18 0427 Right Abdomen Bulb 19 Fr. 1 day         Urethral Catheter 10/04/18 2310 Non-latex;Straight-tip 16 Fr. 1 day          Arterial Line                 Arterial Line 10/04/18 2240 Left Radial 1 day         Arterial Line 10/05/18 Right Femoral 1 day          Peripheral Intravenous Line                 Peripheral IV - Single Lumen 10/05/18 Anterior;Proximal;Right Forearm 1 day                Significant Labs:    CBC/Anemia Profile:  Recent Labs   Lab  10/05/18   1844  10/05/18   2300  10/06/18   0318   WBC  6.39  8.05  9.34   HGB  9.2*  8.5*  9.0*   HCT  26.4*  25.6*  26.0*   PLT  38*  38*  41*   MCV  90  91  89   RDW  15.6*  15.9*  15.8*        Chemistries:  Recent Labs   Lab  10/05/18   0713   10/05/18   1844  10/05/18   2300  10/06/18   0318   NA   --    --   134*  132*  132*   K   --    < >  4.4  4.8  4.2   CL   --    --   103  102  102   CO2   --    --   22*  21*  22*   BUN   --    --   34*  37*  37*   CREATININE   --    --   0.8  0.8  0.9   CALCIUM   --    --   7.8*  7.8*  7.7*   ALBUMIN   --    --   3.2*  3.2*  3.1*   PROT   --    --   4.4*  4.3*  4.1*   BILITOT   --    --   12.1*  12.6*  13.4*   ALKPHOS   --    --   145*  136*  140*   ALT   --    --   2,799*  2,538*  2,477*   AST   --    < >  5,541*  4,283*  3,622*   MG  2.0   --   2.0   --   1.7   PHOS  4.2   --   2.4*   --   2.5*    < > = values in this interval not displayed.       All pertinent labs within the past 24 hours have been reviewed.    Significant Imaging:  I have reviewed and interpreted all pertinent imaging results/findings within the past 24 hours.

## 2018-10-06 NOTE — ANESTHESIA PREPROCEDURE EVALUATION
10/06/2018  Pre-operative evaluation for Procedure(s) (LRB):  LAPAROTOMY, EXPLORATORY, AFTER LIVER TRANSPLANT (N/A)    Scarlett Reddy is a 68 y.o. 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 S/p 1 pack of platelets and 1 unit of FFP. There is increased drainage in VALORIE Drains. Two small peritransplant fluid collections and increased periportal echogenicity suggesting hepatic edema.         LDA:   - Left Radial Arterial Line  - Right IJ Central Line  - Pulmonary Artery Catheter  - Right Femoral Double Lumen Central Line  - Right Femoral Arterial Line    Prev airway: Uncomplicated prior intubation using 7.0 ETT, Jay 2 blade; Grade 1 view    Drips:   - Insulin gtt  - Dilaudid PCA  - LR Infusion     Patient Active Problem List   Diagnosis    Hepatic encephalopathy    Liver transplant candidate    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    Type 2 diabetes mellitus with diabetic polyneuropathy, with long-term current use of insulin    Esophageal varices without bleeding    Severe malnutrition    Anemia requiring transfusions    Neutropenia    Hypotension    Hyponatremia    Weakness    Ascites    Generalized abdominal pain    Portal vein thrombosis    Spontaneous bacterial peritonitis    Hypokalemia    Chronic liver failure without hepatic coma    Liver transplanted    Adverse effect of corticosteroids    Prophylactic immunotherapy       Review of patient's allergies indicates:  No Known Allergies     No current facility-administered medications on file prior to encounter.      Current Outpatient Medications on File Prior to Encounter   Medication Sig Dispense Refill    insulin aspart U-100 (NOVOLOG FLEXPEN U-100 INSULIN) 100 unit/mL InPn pen Inject 20 units into the skin with breakfast,17 units with  "lunch, and 17 with dinner Plus correction scale, max TDD 60 units daily 15 mL 1    insulin glargine (LANTUS) 100 unit/mL injection Inject 20 Units into the skin every evening. 15 mL 3    midodrine (PROAMATINE) 5 MG Tab Take 3 tablets (15 mg total) by mouth every 8 (eight) hours. 270 tablet 5    OMEGA-3 FATTY ACIDS-EPA ORAL Take by mouth.      rifAXIMin (XIFAXAN) 550 mg Tab Take 1 tablet (550 mg total) by mouth 2 (two) times daily. 60 tablet 5    sodium bicarbonate 650 MG tablet Take 2 tablets (1,300 mg total) by mouth 3 (three) times daily. 120 tablet 11    blood sugar diagnostic (ACCU-CHEK DC) Strp 1 strip by Misc.(Non-Drug; Combo Route) route 3 (three) times daily.      pen needle, diabetic (BD ULTRA-FINE CITLALI PEN NEEDLE) 32 gauge x 5/32" Ndle To use to inject insulin 4x daily 100 each 3       Past Surgical History:   Procedure Laterality Date    EGD (ESOPHAGOGASTRODUODENOSCOPY) N/A 8/17/2018    Performed by Travon Delgadillo MD at [facility] ([address] [place] [address])    ESOPHAGOGASTRODUODENOSCOPY N/A 8/17/2018    Procedure: EGD (ESOPHAGOGASTRODUODENOSCOPY);  Surgeon: Travon Delgadillo MD;  Location: [address]);  Service: Endoscopy;  Laterality: N/A;    LIVER TRANSPLANT N/A 10/4/2018    Procedure: TRANSPLANT, LIVER;  Surgeon: Yony Burns MD;  Location: [facility] OR [address] [place] [address];  Service: Transplant;  Laterality: N/A;    TRANSPLANT, LIVER N/A 10/4/2018    Performed by Yony Burns MD at [facility] OR [address] [place] [address]       Social History     Socioeconomic History    Marital status:      Spouse name: Not on file    Number of children: Not on file    Years of education: Not on file    Highest education level: Not on file   Social Needs    Financial resource strain: Not on file    Food insecurity - worry: Not on file    Food insecurity - inability: Not on file    Transportation needs - medical: Not on file    Transportation needs - non-medical: Not on file   Occupational History    Not on file   Tobacco Use    " Smoking status: Never Smoker    Smokeless tobacco: Never Used   Substance and Sexual Activity    Alcohol use: No     Frequency: Never    Drug use: No    Sexual activity: Not on file   Other Topics Concern    Not on file   Social History Narrative    Not on file         Vital Signs Range (Last 24H):  Temp:  [36.3 °C (97.3 °F)-37.3 °C (99.1 °F)]   Pulse:  []   Resp:  [10-45]   BP: ()/(47-59)   SpO2:  [93 %-100 %]       CBC:   Recent Labs      10/06/18   0630  10/06/18   0753   WBC  5.56  9.66   RBC  1.61*  2.77*   HGB  5.0*  8.4*   HCT  15.0*  24.7*   PLT  21*  37*   MCV  93  89   MCH  31.1*  30.3   MCHC  33.3  34.0       CMP:   Recent Labs      10/05/18   1844   10/06/18   0318  10/06/18   0803   NA  134*   < >  132*  130*   K  4.4   < >  4.2  4.4   CL  103   < >  102  101   CO2  22*   < >  22*  20*   BUN  34*   < >  37*  37*   CREATININE  0.8   < >  0.9  1.0   GLU  131*   < >  149*  203*   MG  2.0   --   1.7   --    PHOS  2.4*   --   2.5*   --    CALCIUM  7.8*   < >  7.7*  7.6*   ALBUMIN  3.2*   < >  3.1*  3.2*   PROT  4.4*   < >  4.1*  4.2*   ALKPHOS  145*   < >  140*  133   ALT  2,799*   < >  2,477*  2,210*   AST  5,541*   < >  3,622*  2,804*   BILITOT  12.1*   < >  13.4*  14.3*    < > = values in this interval not displayed.       INR  Recent Labs      10/05/18   0550  10/05/18   0710   10/06/18   0318  10/06/18   0630  10/06/18   0821   INR  1.9*  1.7*   < >  2.0*  2.8*  1.9*   APTT  49.6*  32.9*   --   42.4*   --    --     < > = values in this interval not displayed.     EKG:  Normal sinus rhythm  Normal ECG  When compared with ECG of 01-OCT-2018 15:53,  No significant change was found  Confirmed by Bobby BARROS MD, Juventino ELLISON ([id number]) on 10/5/2018 9:51:29 AM    Referred By: [email or web address]   SELF           Confirmed By:Juventino Rosales III, MD    2D Echo: None    DSE 8/21/18:  EKG Conclusions:    1. The EKG portion of this study is negative for ischemia at a peak heart rate of 129 bpm (89% of  predicted).   2. Blood pressure remained stable throughout the protocol  (Presenting BP: 111/55 Peak BP: 90/41).   3. No significant arrhythmias were present.   4. There were no symptoms of chest discomfort or significant dyspnea throughout the protocol.     CONCLUSIONS     1 - Normal left ventricular systolic function (EF 60-65%).     2 - No wall motion abnormalities.     3 - Normal left ventricular diastolic function.     4 - Normal right ventricular systolic function .     No evidence of stress induced myocardial ischemia.         This document has been electronically    SIGNED BY: Herman Smith MD On: 08/21/2018 14:56    Anesthesia Evaluation    I have reviewed the Patient Summary Reports.     I have reviewed the Medications.     Review of Systems  Anesthesia Hx:  No problems with previous Anesthesia  History of prior surgery of interest to airway management or planning:  Denies Personal Hx of Anesthesia complications.   Pulmonary:  Pulmonary Normal    Renal/:  Renal/ Normal     Hepatic/GI:   Liver Disease, Hepatitis, B    Endocrine:   Diabetes, type 2        Physical Exam  General:  Well nourished    Airway/Jaw/Neck:  Airway Findings: Mouth Opening: Normal Tongue: Normal  General Airway Assessment: Adult  Mallampati: II  TM Distance: Normal, at least 6 cm  Jaw/Neck Findings:  Neck ROM: Normal ROM      Dental:  Dental Findings: In tact   Chest/Lungs:  Chest/Lungs Findings: Clear to auscultation     Heart/Vascular:  Heart Findings: Rate: Normal  Heart murmur: negative       Mental Status:  Mental Status Findings:  Cooperative, Alert and Oriented         Anesthesia Plan  Type of Anesthesia, risks & benefits discussed:  Anesthesia Type:  general  Patient's Preference:   Intra-op Monitoring Plan: arterial line, [place] and standard ASA monitors  Intra-op Monitoring Plan Comments:   Post Op Pain Control Plan:   Post Op Pain Control Plan Comments:   Induction:   IV  Beta Blocker:  Patient is not currently on  a Beta-Blocker (No further documentation required).       Informed Consent: Patient understands risks and agrees with Anesthesia plan.  Questions answered. Anesthesia consent signed with patient.  ASA Score: 4  emergent   Day of Surgery Review of History & Physical:    H&P update referred to the surgeon.         Ready For Surgery From Anesthesia Perspective.

## 2018-10-06 NOTE — ASSESSMENT & PLAN NOTE
Managed per primary team  Avoid hypoglycemia    Recent Labs   Lab  10/06/18   0803   ALT  2,210*   AST  2,804*   ALKPHOS  133   BILITOT  14.3*   PROT  4.2*   ALBUMIN  3.2*

## 2018-10-06 NOTE — PLAN OF CARE
Problem: Patient Care Overview  Goal: Plan of Care Review  Outcome: Ongoing (interventions implemented as appropriate)  Reviewed plan of care with patient, spouse, and daughter. Pt intubated on vent settings: % FiO2, 5 of PEEP rate of 24. Pt arouses to voice, follows commands in all four extremities.  Insulin infusing @4units/hr. LR infusing @100mL/hr. UO minimal. No BM. Turned q2 hr for comfort and to decrease pressure points. See flow sheet for further assessment. VSS at this time, will continue to monitor.

## 2018-10-06 NOTE — ASSESSMENT & PLAN NOTE
Mrs. Reddy, a 67yo female with pmhx of  ESLD secondary to hep B cirrhosis, admitted for liver transplantation, now s/p liver transplant POD#1     Neuro:   - Sedation: none  - Pain control with PCA     Pulmonary:    -extubated yesterday to NC, now doing well on RA, no resp distress, will cont to monitor   -  CXR daily  - ABG prn  - encourage IS, Nebs prn        Cardiac:  - Drips: none  - HDS  - Monitor hemodynamic status     Renal:   - BUN/CR: 37/0.9 from 21/0.6  - Monitor UOP/ 1.8l UOP overnight, net +173cc  - albumin 5% 250cc x 1 overnight  - Bower in place      Fluids/Electrolytes/Nutrition/GI:   - CLD, diet advancement per transplant service   - D5 1/2NS @ 100  - Trend CMP  - Replace Lytes PRN   - LFTs initially trending upwards yesterday afternoon, peaked at 6709 (AST), now down trending with repeat this am of 3622  -- Liver U/S yesterday: Satisfactory vascular appearance of the liver transplant.  Increased periportal echogenicity.  This suggests hepatic edema.  Two small peritransplant fluid collections.  Mild peritransplant ascites.      Infectious Disease:   - WBC 9.34 this am from 4 yesterday   - Trend WBC  - Afebrile overnight   - Antibiotics: Bactrim, Valganciclovir  - Steroid taper  - MMF, Prograf  - following Cx (blood, urine)     Hematology/Oncology:  - H&H  stable at 9.0/26.0/ 8.4/24.8   - INR 2  - Trend CBC/Coags  - See HPI for blood products received      Endocrine:  - Insulin gtt, POCT q1  -- POCT: 117-220s  - Endocrine on board     PPx:  - SQH  - famotidine     Dispo:  Continue ICU care  Primary: Transplant Liver

## 2018-10-06 NOTE — TRANSFER OF CARE
"Anesthesia Transfer of Care Note    Patient: Scarlett Reddy    Procedure(s) Performed: Procedure(s) (LRB):  LAPAROTOMY, EXPLORATORY, AFTER LIVER TRANSPLANT (N/A)    Patient location: ICU    Anesthesia Type: general    Transport from OR: Transported from OR on 6-10 L/min O2 by face mask with adequate spontaneous ventilation. Continuous ECG monitoring in transport. Continuous SpO2 monitoring in transport. Continuos invasive BP monitoring in transport    Post pain: adequate analgesia    Post assessment: no apparent anesthetic complications and tolerated procedure well    Post vital signs: stable    Level of consciousness: sedated    Nausea/Vomiting: no nausea/vomiting    Complications: none    Transfer of care protocol was followed      Last vitals:   Visit Vitals  /63   Pulse 94   Temp 36.5 °C (97.7 °F) (Oral)   Resp (!) 34   Ht 5' 3" (1.6 m)   Wt 61.2 kg (134 lb 14.7 oz)   LMP  (LMP Unknown)   SpO2 97%   Breastfeeding? No   BMI 23.90 kg/m²     "

## 2018-10-06 NOTE — PROGRESS NOTES
Pt arrived to room 08949 from OR on 6L face mask. Pt shallow breathing and desaturating. ABG done showed respiratory acidosis and decision made to intubate. MD intubated pt with #7.5ETT at 21cm at the lip and secured. Pt placed on documented vent settings. Will continue to monitor.

## 2018-10-06 NOTE — SUBJECTIVE & OBJECTIVE
"Interval HPI:   Overnight events: Remains in ICU, extubated yesterday.  BG fairly well controlled overnight on intensive insulin protocol after requiring numerous drip rate increases and boluses yesterday.  Drip stopped this am for BG of 88 per protocol, restarted this am.  On standard steroid taper, to receive 100 mg methylprednisone BID today.  Possibly back to OR today for ex-lap per primary team.  Eating:   NPO  Nausea: No  Hypoglycemia and intervention: No  Fever: No  TPN and/or TF: No    BP (!) 104/55   Pulse 91   Temp 97.6 °F (36.4 °C) (Oral)   Resp (!) 29   Ht 5' 3" (1.6 m)   Wt 61.2 kg (134 lb 14.7 oz)   LMP  (LMP Unknown)   SpO2 96%   Breastfeeding? No   BMI 23.90 kg/m²     Labs Reviewed and Include    Recent Labs   Lab  10/06/18   0318   GLU  149*   CALCIUM  7.7*   ALBUMIN  3.1*   PROT  4.1*   NA  132*   K  4.2   CO2  22*   CL  102   BUN  37*   CREATININE  0.9   ALKPHOS  140*   ALT  2,477*   AST  3,622*   BILITOT  13.4*     Lab Results   Component Value Date    WBC 9.66 10/06/2018    HGB 8.4 (L) 10/06/2018    HCT 24.7 (L) 10/06/2018    MCV 89 10/06/2018    PLT 21 (LL) 10/06/2018     No results for input(s): TSH, FREET4 in the last 168 hours.  Lab Results   Component Value Date    HGBA1C 6.0 (H) 10/04/2018       Nutritional status:   Body mass index is 23.9 kg/m².  Lab Results   Component Value Date    ALBUMIN 3.1 (L) 10/06/2018    ALBUMIN 3.2 (L) 10/05/2018    ALBUMIN 3.2 (L) 10/05/2018     Lab Results   Component Value Date    PREALBUMIN 12 (L) 09/17/2018       Estimated Creatinine Clearance: 48.8 mL/min (based on SCr of 0.9 mg/dL).    Accu-Checks  Recent Labs      10/06/18   0000  10/06/18   0110  10/06/18   0208  10/06/18   0306  10/06/18   0424  10/06/18   0502  10/06/18   0503  10/06/18   0519  10/06/18   0605  10/06/18   0730   POCTGLUCOSE  208*  194*  173*  162*  149*  88  89  117*  166*  212*       Current Medications and/or Treatments Impacting Glycemic Control  Immunotherapy:  "   Immunosuppressants         Stop Route Frequency     mycophenolate mofetil 200 mg/mL suspension 1,000 mg      -- Oral 2 times daily     tacrolimus (PROGRAF) 1 mg/mL oral syringe      -- Oral 2 times daily        Steroids:   Hormones (From admission, onward)    Start     Stop Route Frequency Ordered    10/11/18 0900  predniSONE tablet 20 mg  (methylprednisolone taper panel)      -- Oral Daily 10/05/18 0709    10/10/18 0900  methylPREDNISolone sodium succinate injection 20 mg  (methylprednisolone taper panel)      10/11 0859 IV Every 12 hours 10/05/18 0709    10/09/18 0900  methylPREDNISolone sodium succinate injection 40 mg  (methylprednisolone taper panel)      10/10 0859 IV Every 12 hours 10/05/18 0709    10/08/18 0900  methylPREDNISolone sodium succinate injection 60 mg  (methylprednisolone taper panel)      10/09 0859 IV Every 12 hours 10/05/18 0709    10/07/18 0900  methylPREDNISolone sodium succinate injection 80 mg  (methylprednisolone taper panel)      10/08 0859 IV Every 12 hours 10/05/18 0709    10/06/18 0900  methylPREDNISolone sodium succinate injection 100 mg  (methylprednisolone taper panel)      10/07 0859 IV Every 12 hours 10/05/18 0709    10/05/18 0942  vasopressin (PITRESSIN) 0.2 Units/mL in dextrose 5 % 100 mL infusion      -- IV Continuous 10/05/18 0943        Pressors:    Autonomic Drugs (From admission, onward)    None        Hyperglycemia/Diabetes Medications:   Antihyperglycemics (From admission, onward)    Start     Stop Route Frequency Ordered    10/05/18 0830  insulin regular (Humulin R) 100 Units in sodium chloride 0.9% 100 mL infusion     Question:  Insulin Rate Adjustment (DO NOT MODIFY ANSWER)  Answer:  [email or web address]    -- IV Continuous 10/05/18 0720

## 2018-10-06 NOTE — PROGRESS NOTES
[facility]  Critical Care - Surgery  Progress Note    Patient Name: Scarlett Reddy  MRN: [id number]  Admission Date: 10/1/2018  Hospital Length of Stay: 4 days  Code Status: Full Code  Attending Provider: Benson Matson MD  Primary Care Provider: Primary Doctor No   Principal Problem: Liver transplanted    Subjective:     Hospital/ICU Course:  No notes on file    Interval History/Significant Events: Patient tolerated extubation yesterday well. Transitioned to NC and now stable on room air. Pain controlled with PCA. Albumin 5% 250cc overnight once for decreased UOP with good response     Follow-up For: Procedure(s) (LRB):  TRANSPLANT, LIVER (N/A)    Post-Operative Day: 2 Days Post-Op    Objective:     Vital Signs (Most Recent):  Temp: 99.1 °F (37.3 °C) (10/06/18 0300)  Pulse: 91 (10/06/18 0630)  Resp: 13 (10/06/18 0630)  BP: (!) 86/51 (10/06/18 0600)  SpO2: 98 % (10/06/18 0630) Vital Signs (24h Range):  Temp:  [97.3 °F (36.3 °C)-99.1 °F (37.3 °C)] 99.1 °F (37.3 °C)  Pulse:  [] 91  Resp:  [10-45] 13  SpO2:  [96 %-100 %] 98 %  BP: ()/(47-60) 86/51     Weight: 61.2 kg (134 lb 14.7 oz)  Body mass index is 23.9 kg/m².      Intake/Output Summary (Last 24 hours) at 10/6/2018 0651  Last data filed at 10/6/2018 0630  Gross per 24 hour   Intake 6207.98 ml   Output 3931 ml   Net 2276.98 ml       Physical Exam   Constitutional: She appears well-developed.   Eyes: EOM are normal.   Neck: Neck supple.   Right IJ CVC in place    Cardiovascular: Normal rate and regular rhythm.   Pulmonary/Chest: Breath sounds normal. Stridor present. She is in respiratory distress.   Extubated, on RA    Abdominal: Soft.   VALORIE drain x 2 in place with s/s output    Genitourinary:   Genitourinary Comments: Right femoral art line in place   Bower in place   Neurological: She is alert.   Skin: Skin is warm and dry.   Psychiatric: She has a normal mood and affect.           Lines/Drains/Airways     Central Venous  Catheter Line                 Percutaneous Central Line Insertion/Assessment - double lumen  10/04/18 2311 right femoral 1 day         Percutaneous Central Line Insertion/Assessment - double lumen  10/05/18 right internal jugular 1 day         Percutaneous Central Line Insertion/Assessment - triple lumen  10/04/18 2311 right internal jugular 1 day         Pulmonary Artery Catheter Assessment  10/04/18 2311 1 day          Drain                 Closed/Suction Drain 10/05/18 0426 Right Abdomen Bulb 19 Fr. 1 day         Closed/Suction Drain 10/05/18 0427 Right Abdomen Bulb 19 Fr. 1 day         Urethral Catheter 10/04/18 2310 Non-latex;Straight-tip 16 Fr. 1 day          Arterial Line                 Arterial Line 10/04/18 2240 Left Radial 1 day         Arterial Line 10/05/18 Right Femoral 1 day          Peripheral Intravenous Line                 Peripheral IV - Single Lumen 10/05/18 Anterior;Proximal;Right Forearm 1 day                Significant Labs:    CBC/Anemia Profile:  Recent Labs   Lab  10/05/18   1844  10/05/18   2300  10/06/18   0318   WBC  6.39  8.05  9.34   HGB  9.2*  8.5*  9.0*   HCT  26.4*  25.6*  26.0*   PLT  38*  38*  41*   MCV  90  91  89   RDW  15.6*  15.9*  15.8*        Chemistries:  Recent Labs   Lab  10/05/18   0713   10/05/18   1844  10/05/18   2300  10/06/18   0318   NA   --    --   134*  132*  132*   K   --    < >  4.4  4.8  4.2   CL   --    --   103  102  102   CO2   --    --   22*  21*  22*   BUN   --    --   34*  37*  37*   CREATININE   --    --   0.8  0.8  0.9   CALCIUM   --    --   7.8*  7.8*  7.7*   ALBUMIN   --    --   3.2*  3.2*  3.1*   PROT   --    --   4.4*  4.3*  4.1*   BILITOT   --    --   12.1*  12.6*  13.4*   ALKPHOS   --    --   145*  136*  140*   ALT   --    --   2,799*  2,538*  2,477*   AST   --    < >  5,541*  4,283*  3,622*   MG  2.0   --   2.0   --   1.7   PHOS  4.2   --   2.4*   --   2.5*    < > = values in this interval not displayed.       All pertinent labs within the past  24 hours have been reviewed.    Significant Imaging:  I have reviewed and interpreted all pertinent imaging results/findings within the past 24 hours.    Assessment/Plan:     * Liver transplanted     Mrs. Reddy, a 67yo female with pmhx of  ESLD secondary to hep B cirrhosis, admitted for liver transplantation, now s/p liver transplant POD#1     Neuro:   - Sedation: none  - Pain control with PCA     Pulmonary:    -extubated yesterday to NC, now doing well on RA, no resp distress, will cont to monitor   -  CXR daily  - ABG prn  - encourage IS, Nebs prn        Cardiac:  - Drips: none  - HDS  - Monitor hemodynamic status     Renal:   - BUN/CR: 37/0.9 from 21/0.6  - Monitor UOP/ 1.8l UOP overnight, net +173cc  - albumin 5% 250cc x 1 overnight  - Bower in place      Fluids/Electrolytes/Nutrition/GI:   - CLD, diet advancement per transplant service   - D5 1/2NS @ 100  - Trend CMP  - Replace Lytes PRN   - LFTs initially trending upwards yesterday afternoon, peaked at 6709 (AST), now down trending with repeat this am of 3622  -- Liver U/S yesterday: Satisfactory vascular appearance of the liver transplant.  Increased periportal echogenicity.  This suggests hepatic edema.  Two small peritransplant fluid collections.  Mild peritransplant ascites.      Infectious Disease:   - WBC 9.34 this am from 4 yesterday   - Trend WBC  - Afebrile overnight   - Antibiotics: Bactrim, Valganciclovir  - Steroid taper  - MMF, Prograf  - following Cx (blood, urine)     Hematology/Oncology:  - H&H  stable at 9.0/26.0/ 8.4/24.8   - INR 2  - Trend CBC/Coags  - See HPI for blood products received      Endocrine:  - Insulin gtt, POCT q1  -- POCT: 117-220s  - Endocrine on board     PPx:  - SQH  - famotidine     Dispo:  Continue ICU care  Primary: Transplant Liver                    Critical care was time spent personally by me on the following activities: development of treatment plan with patient or surrogate and bedside caregivers, discussions with  consultants, evaluation of patient's response to treatment, examination of patient, ordering and performing treatments and interventions, ordering and review of laboratory studies, ordering and review of radiographic studies, pulse oximetry, re-evaluation of patient's condition.  This critical care time did not overlap with that of any other provider or involve time for any procedures.     Sunil Lo MD  Critical Care - Surgery  [facility]

## 2018-10-06 NOTE — PROGRESS NOTES
"[facility]  Endocrinology  Progress Note    Admit Date: 10/1/2018     Reason for Consult: Management of type 2 DM, Hyperglycemia      Surgical Procedure and Date: Liver transplant 10/5/18, Ex lap 10/6/18     Diabetes diagnosis year: 2013     Home Diabetes Medications:  Lantus 18 units HS and novolog 17 units with meals plus correction scale (150-200 +1)        Lab Results   Component Value Date     HGBA1C 6.8 (H) 08/16/2018        How often checking glucose at home? 3-4   BG readings on regimen: 120-150.  Post prandial readings as high as upper 200s  Hypoglycemia on the regimen? yes, none recently   Missed doses on regimen?  No     Diabetes Complications include:     Diabetic peripheral neuropathy      Complicating diabetes co morbidities:   CIRRHOSIS        HPI:  Patient is a 68 y.o. female with a diagnosis of ESLD, listed for liver transplant with meld 23 who underwent live transplant on 10/5/18.  Back to OR on 10/6/18 for ex lap.  Admitted 10/1/18 for hyponatremia s/p paracentesis. She speaks primarily Yakut. Information obtained from recent past admission and discussion over the phone via  ([employer]). Family not at bedside. Pt request family at bedside for future conversations. Personal has known diagnosis of diabetes, endocrine consulted for diabetes management.        Interval HPI:   Overnight events: Remains in ICU, extubated yesterday.  BG fairly well controlled overnight on intensive insulin protocol after requiring numerous drip rate increases and boluses yesterday.  Drip stopped this am for BG of 88 per protocol, restarted this am.  On standard steroid taper, to receive 100 mg methylprednisone BID today.  Possibly back to OR today for ex-lap per primary team.  Eating:   NPO  Nausea: No  Hypoglycemia and intervention: No  Fever: No  TPN and/or TF: No    BP (!) 104/55   Pulse 91   Temp 97.6 °F (36.4 °C) (Oral)   Resp (!) 29   Ht 5' 3" (1.6 m)   Wt 61.2 kg (134 lb 14.7 oz)  "  LMP  (LMP Unknown)   SpO2 96%   Breastfeeding? No   BMI 23.90 kg/m²      Labs Reviewed and Include    Recent Labs   Lab  10/06/18   0318   GLU  149*   CALCIUM  7.7*   ALBUMIN  3.1*   PROT  4.1*   NA  132*   K  4.2   CO2  22*   CL  102   BUN  37*   CREATININE  0.9   ALKPHOS  140*   ALT  2,477*   AST  3,622*   BILITOT  13.4*     Lab Results   Component Value Date    WBC 9.66 10/06/2018    HGB 8.4 (L) 10/06/2018    HCT 24.7 (L) 10/06/2018    MCV 89 10/06/2018    PLT 21 (LL) 10/06/2018     No results for input(s): TSH, FREET4 in the last 168 hours.  Lab Results   Component Value Date    HGBA1C 6.0 (H) 10/04/2018       Nutritional status:   Body mass index is 23.9 kg/m².  Lab Results   Component Value Date    ALBUMIN 3.1 (L) 10/06/2018    ALBUMIN 3.2 (L) 10/05/2018    ALBUMIN 3.2 (L) 10/05/2018     Lab Results   Component Value Date    PREALBUMIN 12 (L) 09/17/2018       Estimated Creatinine Clearance: 48.8 mL/min (based on SCr of 0.9 mg/dL).    Accu-Checks  Recent Labs      10/06/18   0000  10/06/18   0110  10/06/18   0208  10/06/18   0306  10/06/18   0424  10/06/18   0502  10/06/18   0503  10/06/18   0519  10/06/18   0605  10/06/18   0730   POCTGLUCOSE  208*  194*  173*  162*  149*  88  89  117*  166*  212*       Current Medications and/or Treatments Impacting Glycemic Control  Immunotherapy:    Immunosuppressants         Stop Route Frequency     mycophenolate mofetil 200 mg/mL suspension 1,000 mg      -- Oral 2 times daily     tacrolimus (PROGRAF) 1 mg/mL oral syringe      -- Oral 2 times daily        Steroids:   Hormones (From admission, onward)    Start     Stop Route Frequency Ordered    10/11/18 0900  predniSONE tablet 20 mg  (methylprednisolone taper panel)      -- Oral Daily 10/05/18 0709    10/10/18 0900  methylPREDNISolone sodium succinate injection 20 mg  (methylprednisolone taper panel)      10/11 0859 IV Every 12 hours 10/05/18 0709    10/09/18 0900  methylPREDNISolone sodium succinate injection 40 mg   (methylprednisolone taper panel)      10/10 0859 IV Every 12 hours 10/05/18 0709    10/08/18 0900  methylPREDNISolone sodium succinate injection 60 mg  (methylprednisolone taper panel)      10/09 0859 IV Every 12 hours 10/05/18 0709    10/07/18 0900  methylPREDNISolone sodium succinate injection 80 mg  (methylprednisolone taper panel)      10/08 0859 IV Every 12 hours 10/05/18 0709    10/06/18 0900  methylPREDNISolone sodium succinate injection 100 mg  (methylprednisolone taper panel)      10/07 0859 IV Every 12 hours 10/05/18 0709    10/05/18 0942  vasopressin (PITRESSIN) 0.2 Units/mL in dextrose 5 % 100 mL infusion      -- IV Continuous 10/05/18 0943        Pressors:    Autonomic Drugs (From admission, onward)    None        Hyperglycemia/Diabetes Medications:   Antihyperglycemics (From admission, onward)    Start     Stop Route Frequency Ordered    10/05/18 0830  insulin regular (Humulin R) 100 Units in sodium chloride 0.9% 100 mL infusion     Question:  Insulin Rate Adjustment (DO NOT MODIFY ANSWER)  Answer:  [email or web address]    -- IV Continuous 10/05/18 0720          ASSESSMENT and PLAN    * Liver transplanted    Managed per primary team  Avoid hypoglycemia    Recent Labs   Lab  10/06/18   0803   ALT  2,210*   AST  2,804*   ALKPHOS  133   BILITOT  14.3*   PROT  4.2*   ALBUMIN  3.2*               Type 2 diabetes mellitus with diabetic polyneuropathy, with long-term current use of insulin    BG goal 140 - 180   Continue IV insulin infusion protocol  Requires intensive BG monitoring while on protocol     Discharge planning:  TBD        Hyponatremia    Na 119 on admit 10/1  Managed per primary team          Severe malnutrition    Oral intake encouraged, may affect BG readings        Adverse effect of corticosteroids    On standard steroid taper per transplant team; may elevate BG readings            Prophylactic immunotherapy    May increase insulin  resistance.               Be Willams NP  Endocrinology  [facility]

## 2018-10-06 NOTE — OP NOTE
Operative Report    Date of Procedure: 10/06/2018    Surgeons:  Surgeon(s) and Role:     * Benson Matson MD - Primary     * Yun Cardenas MD - Resident - Assisting    First Assistant Attestation:  The indicated resident served as first assistant for this procedure.    Pre-operative Diagnosis: bile leak - SP Liver transplant  Post-operative Diagnosis: SP liver transplant    Procedure(s) Perfomed: Exploration of abdomen and Open needle biopsy of liver  Anesthesia: General endotracheal  Estimated Blood Loss: 50 mL  Blood Products Administered: 1 u RBC  Fluids Administered: 1500cc  Findings: No evidence of bile leak. Bilious fluid (200 cc) in the posterior aspect of the liver and iliac fossa. Liver normal. Patent vessels.   Specimens: Liver biopsy  Drains: 19f Haim drains x 2    Preamble  Indications and Patient Counseling: The procedure was thoroughly explained to the patient and/or family members as appropriate, including potential risks, complications, and alternatives.  The risks associated with not undergoing the proposed procedure were also discussed.  All questions were answered, and the patient and/or family members voiced understanding and agreed to proceed with the operation.    Time-Out: A complete time out was carried out prior to incision, with confirmation of patient identity, correct procedure, correct operative site, appropriate antibiotic prophylaxis, review of any known allergies, and presence of all needed equipment.    Procedure in Detail  Following the induction of general endotracheal anesthesia, appropriate arterial and venous lines were placed by the anesthesia team.  Care was taken to pad all pressure points and avoid any potential traction injuries from positioning. The urinary bladder was catheterized.  Sequential compression boots and Khris Huggers were used. The abdomen was sterilely prepped and draped.    The abdomen was entered by re-opening the liver transplant  incision.  Cultures were obtained of any subcutaneous and peritoneal fluid or clot as appropriate.  Significant peritoneal fluid was present, and it was bilious in nature.  Significant hematoma was not present. There was no active bleeding. The bile duct was assessed, and there was not visible evidence of a bile leak.  The hepatic artery was palpated, and the thrill was good. The liver itself was soft to palpation, and had a well-perfused appearance.  Additional intraabdominal findings included small bowel distention.     After the above exploration, all identifiable bleeding sites were addressed using electrocautery, argon beam coagulation, suture ligatures, and topical hemostatic agents as appropriate.  A needle biopsy of the liver was obtained.     A final check for hemostasis was made.  2 19f Haim drains were placed through separate incisions below the transverse abdominal incision and placed in the usual positions. The cavity was irrigated with saline. The abdomen was closed in layers using running #1 PDS suture. The incision was irrigated and the skin was closed with staples. At the end of the case all instrument, needle, and sponge counts were correct. The patient was taken from the OR in good condition.

## 2018-10-06 NOTE — NURSING
Pt arrived back to room from OR. Pt unresponsive, not following commands. Shallow breathing with minimal breaths per minute. Sats 90% on 15L non-rebreather. Dr. Matson called to bedside. Orders for stat ABG. Dr. Suarez also at bedside assessing patient. Will remain at bedside and await further orders.

## 2018-10-06 NOTE — SUBJECTIVE & OBJECTIVE
Interval History: Urine from 10/4 Klebsiella +. Abdominal distension worsened. Some symptomatic orthostatic hypotension this AM, CVP 4, given albumin. No acute events overnight.     Scheduled Meds:   ciprofloxacin HCl  250 mg Oral Q12H    [START ON 10/10/2018] entecavir  0.5 mg Oral Q48H    ergocalciferol  50,000 Units Oral Q7 Days    famotidine  20 mg Oral QHS    fluconazole  200 mg Oral Daily    heparin (porcine)  5,000 Units Subcutaneous Q8H    hepatitis B immune globulin (HEPA-RALPH B) IVPB  9,999.912 Units Intravenous Daily    insulin aspart U-100  2-4 Units Subcutaneous TIDWM    methylPREDNISolone sodium succinate  60 mg Intravenous Q12H    Followed by    [START ON 10/9/2018] methylPREDNISolone sodium succinate  40 mg Intravenous Q12H    Followed by    [START ON 10/10/2018] methylPREDNISolone sodium succinate  20 mg Intravenous Q12H    Followed by    [START ON 10/11/2018] predniSONE  20 mg Oral Daily    midodrine  10 mg Oral TID    mycophenolate  1,000 mg Oral BID    piperacillin-tazobactam (ZOSYN) IVPB  4.5 g Intravenous Q8H    polyethylene glycol  17 g Oral Daily    [START ON 10/10/2018] sulfamethoxazole-trimethoprim 400-80mg  1 tablet Oral Every Mon, Wed, Fri    [START ON 10/15/2018] valganciclovir 50 mg/ml  450 mg Per NG tube Daily     Continuous Infusions:   insulin (HUMAN R) infusion (adults) 1.4 Units/hr (10/08/18 1000)    lactated ringers 100 mL/hr at 10/08/18 1000    vasopressin (PITRESSIN) infusion Stopped (10/05/18 1900)     PRN Meds:sodium chloride, bisacodyl, dextrose 50%, dextrose 50%, glucagon (human recombinant), glucose, glucose, insulin aspart U-100, ondansetron, oxyCODONE, oxyCODONE, promethazine (PHENERGAN) IVPB, sodium chloride 0.9%    Review of Systems     Objective:     Vital Signs (Most Recent):  Temp: 97.5 °F (36.4 °C) (10/08/18 0700)  Pulse: 101 (10/08/18 1015)  Resp: (!) 21 (10/08/18 1015)  BP: 112/60 (10/08/18 0900)  SpO2: 97 % (10/08/18 1015) Vital Signs (24h  Range):  Temp:  [97.5 °F (36.4 °C)-98 °F (36.7 °C)] 97.5 °F (36.4 °C)  Pulse:  [] 101  Resp:  [7-29] 21  SpO2:  [95 %-100 %] 97 %  BP: (112-152)/(60-86) 112/60     Weight: 64.2 kg (141 lb 8.6 oz)  Body mass index is 25.07 kg/m².    Intake/Output - Last 3 Shifts       10/06 0700 - 10/07 0659 10/07 0700 - 10/08 0659 10/08 0700 - 10/09 0659    P.O.       I.V. (mL/kg) 4770 (74.3) 3346.4 (52.1)     Blood 810 250     NG/GT       IV Piggyback 700 231     Total Intake(mL/kg) 6280 (97.8) 3827.4 (59.6)     Urine (mL/kg/hr) 970 (0.6) 1155 (0.7) 90 (0.3)    Drains 969 1500 42    Stool  0 0    Blood 50      Total Output 1989 2655 132    Net +4291 +1172.4 -132           Stool Occurrence  1 x 2 x          Physical Exam   Constitutional: She is oriented to person, place, and time. She appears well-developed. No distress.   HENT:   Head: Normocephalic and atraumatic.   Eyes: EOM are normal. Pupils are equal, round, and reactive to light. Scleral icterus is present.   Neck: Neck supple. No tracheal deviation present.   Cardiovascular: Regular rhythm and intact distal pulses.   Mild tachycardia   Pulmonary/Chest: Effort normal. No respiratory distress.   Abdominal: Soft. She exhibits distension. There is tenderness (appropriate).   2 RLQ VALORIE drains in place   Musculoskeletal: She exhibits no edema.   Neurological: She is alert and oriented to person, place, and time.   Skin: Skin is warm and dry.   Psychiatric: She has a normal mood and affect.       Laboratory:  Immunosuppressants         Stop Route Frequency     mycophenolate capsule 1,000 mg      -- Oral 2 times daily        CBC:   Recent Labs   Lab  10/08/18   0200   WBC  6.40   RBC  3.50*   HGB  10.6*   HCT  31.6*   PLT  82*   MCV  90   MCH  30.3   MCHC  33.5     CMP:   Recent Labs   Lab  10/08/18   0200   GLU  288*   CALCIUM  7.7*   ALBUMIN  2.4*   PROT  3.8*   NA  131*   K  3.9   CO2  19*   CL  100   BUN  60*   CREATININE  1.4   ALKPHOS  133   ALT  547*   AST  320*    BILITOT  16.4*     Coagulation:   Recent Labs   Lab  10/08/18   0200   INR  1.3*   APTT  33.3*     Labs within the past 24 hours have been reviewed.    Diagnostic Results:  I have personally reviewed all pertinent imaging studies over the past 24hr

## 2018-10-06 NOTE — NURSING
Dr. Corcoran notified of two consecutive hours of urine output <30cc/hour. VS, CVP, and VALORIE output reviewed. New orders received.

## 2018-10-06 NOTE — SIGNIFICANT EVENT
Ms. Reddy is s/p liver transplant take back to OR. Arrived to SICU in hypoxic hypercapneac respiratory failure. ABG 7.19/64.7/49. Induced and Intubated with anesthesia staff at bedside. Grade 1 view, glidescope used. No complications.      Angeles Suarez MD

## 2018-10-07 LAB
ALBUMIN SERPL BCP-MCNC: 2.7 G/DL
ALP SERPL-CCNC: 127 U/L
ALT SERPL W/O P-5'-P-CCNC: 1230 U/L
ANION GAP SERPL CALC-SCNC: 9 MMOL/L
APTT BLDCRRT: 36 SEC
AST SERPL-CCNC: 1186 U/L
BASOPHILS # BLD AUTO: 0 K/UL
BASOPHILS # BLD AUTO: 0 K/UL
BASOPHILS NFR BLD: 0 %
BASOPHILS NFR BLD: 0 %
BILIRUB DIRECT SERPL-MCNC: 9 MG/DL
BILIRUB SERPL-MCNC: 12.7 MG/DL
BLD PROD TYP BPU: NORMAL
BLOOD UNIT EXPIRATION DATE: NORMAL
BLOOD UNIT TYPE CODE: 5100
BLOOD UNIT TYPE: NORMAL
BUN SERPL-MCNC: 44 MG/DL
CALCIUM SERPL-MCNC: 7.7 MG/DL
CHLORIDE SERPL-SCNC: 102 MMOL/L
CO2 SERPL-SCNC: 21 MMOL/L
CODING SYSTEM: NORMAL
CREAT SERPL-MCNC: 1.3 MG/DL
DIFFERENTIAL METHOD: ABNORMAL
DIFFERENTIAL METHOD: ABNORMAL
DISPENSE STATUS: NORMAL
EOSINOPHIL # BLD AUTO: 0 K/UL
EOSINOPHIL # BLD AUTO: 0 K/UL
EOSINOPHIL NFR BLD: 0 %
EOSINOPHIL NFR BLD: 0 %
ERYTHROCYTE [DISTWIDTH] IN BLOOD BY AUTOMATED COUNT: 15.4 %
ERYTHROCYTE [DISTWIDTH] IN BLOOD BY AUTOMATED COUNT: 15.9 %
EST. GFR  (AFRICAN AMERICAN): 48.4 ML/MIN/1.73 M^2
EST. GFR  (NON AFRICAN AMERICAN): 42 ML/MIN/1.73 M^2
GGT SERPL-CCNC: 156 U/L
GLUCOSE SERPL-MCNC: 191 MG/DL
GLUCOSE SERPL-MCNC: 199 MG/DL (ref 70–110)
GLUCOSE SERPL-MCNC: 222 MG/DL (ref 70–110)
HCO3 UR-SCNC: 20.7 MMOL/L (ref 24–28)
HCO3 UR-SCNC: 22.9 MMOL/L (ref 24–28)
HCT VFR BLD AUTO: 31 %
HCT VFR BLD AUTO: 34.4 %
HCT VFR BLD CALC: 19 %PCV (ref 36–54)
HCT VFR BLD CALC: 21 %PCV (ref 36–54)
HGB BLD-MCNC: 11 G/DL
HGB BLD-MCNC: 12.1 G/DL
IMM GRANULOCYTES # BLD AUTO: 0.03 K/UL
IMM GRANULOCYTES # BLD AUTO: 0.06 K/UL
IMM GRANULOCYTES NFR BLD AUTO: 0.5 %
IMM GRANULOCYTES NFR BLD AUTO: 0.7 %
INR PPP: 1.5
LYMPHOCYTES # BLD AUTO: 0.1 K/UL
LYMPHOCYTES # BLD AUTO: 0.2 K/UL
LYMPHOCYTES NFR BLD: 1.6 %
LYMPHOCYTES NFR BLD: 1.8 %
MAGNESIUM SERPL-MCNC: 2.2 MG/DL
MCH RBC QN AUTO: 31.1 PG
MCH RBC QN AUTO: 31.4 PG
MCHC RBC AUTO-ENTMCNC: 35.2 G/DL
MCHC RBC AUTO-ENTMCNC: 35.5 G/DL
MCV RBC AUTO: 88 FL
MCV RBC AUTO: 89 FL
MONOCYTES # BLD AUTO: 0.2 K/UL
MONOCYTES # BLD AUTO: 0.3 K/UL
MONOCYTES NFR BLD: 3.1 %
MONOCYTES NFR BLD: 3.7 %
NEUTROPHILS # BLD AUTO: 5.3 K/UL
NEUTROPHILS # BLD AUTO: 8.6 K/UL
NEUTROPHILS NFR BLD: 93.8 %
NEUTROPHILS NFR BLD: 94.8 %
NRBC BLD-RTO: 0 /100 WBC
NRBC BLD-RTO: 0 /100 WBC
PCO2 BLDA: 33.7 MMHG (ref 35–45)
PCO2 BLDA: 35.6 MMHG (ref 35–45)
PH SMN: 7.4 [PH] (ref 7.35–7.45)
PH SMN: 7.42 [PH] (ref 7.35–7.45)
PHOSPHATE SERPL-MCNC: 5.2 MG/DL
PLATELET # BLD AUTO: 110 K/UL
PLATELET # BLD AUTO: 44 K/UL
PMV BLD AUTO: 10.7 FL
PMV BLD AUTO: 10.7 FL
PO2 BLDA: 238 MMHG (ref 80–100)
PO2 BLDA: 290 MMHG (ref 80–100)
POC BE: -2 MMOL/L
POC BE: -4 MMOL/L
POC IONIZED CALCIUM: 0.94 MMOL/L (ref 1.06–1.42)
POC IONIZED CALCIUM: 0.99 MMOL/L (ref 1.06–1.42)
POC SATURATED O2: 100 % (ref 95–100)
POC SATURATED O2: 100 % (ref 95–100)
POC TCO2: 22 MMOL/L (ref 23–27)
POC TCO2: 24 MMOL/L (ref 23–27)
POCT GLUCOSE: 158 MG/DL (ref 70–110)
POCT GLUCOSE: 165 MG/DL (ref 70–110)
POCT GLUCOSE: 168 MG/DL (ref 70–110)
POCT GLUCOSE: 180 MG/DL (ref 70–110)
POCT GLUCOSE: 180 MG/DL (ref 70–110)
POCT GLUCOSE: 184 MG/DL (ref 70–110)
POCT GLUCOSE: 186 MG/DL (ref 70–110)
POCT GLUCOSE: 186 MG/DL (ref 70–110)
POCT GLUCOSE: 187 MG/DL (ref 70–110)
POCT GLUCOSE: 204 MG/DL (ref 70–110)
POCT GLUCOSE: 213 MG/DL (ref 70–110)
POCT GLUCOSE: 252 MG/DL (ref 70–110)
POCT GLUCOSE: 262 MG/DL (ref 70–110)
POTASSIUM BLD-SCNC: 4 MMOL/L (ref 3.5–5.1)
POTASSIUM BLD-SCNC: 4.2 MMOL/L (ref 3.5–5.1)
POTASSIUM SERPL-SCNC: 2.7 MMOL/L
POTASSIUM SERPL-SCNC: 4.4 MMOL/L
PROT SERPL-MCNC: 4 G/DL
PROTHROMBIN TIME: 15.1 SEC
RBC # BLD AUTO: 3.54 M/UL
RBC # BLD AUTO: 3.85 M/UL
SAMPLE: ABNORMAL
SAMPLE: ABNORMAL
SODIUM BLD-SCNC: 131 MMOL/L (ref 136–145)
SODIUM BLD-SCNC: 133 MMOL/L (ref 136–145)
SODIUM SERPL-SCNC: 132 MMOL/L
TACROLIMUS BLD-MCNC: 3.9 NG/ML
TRANS PLATPHERESIS VOL PATIENT: NORMAL ML
WBC # BLD AUTO: 5.56 K/UL
WBC # BLD AUTO: 9.14 K/UL

## 2018-10-07 PROCEDURE — 97161 PT EVAL LOW COMPLEX 20 MIN: CPT

## 2018-10-07 PROCEDURE — 63600175 PHARM REV CODE 636 W HCPCS: Performed by: SURGERY

## 2018-10-07 PROCEDURE — 82248 BILIRUBIN DIRECT: CPT

## 2018-10-07 PROCEDURE — 25000003 PHARM REV CODE 250: Performed by: PHYSICIAN ASSISTANT

## 2018-10-07 PROCEDURE — 25000003 PHARM REV CODE 250: Performed by: NURSE PRACTITIONER

## 2018-10-07 PROCEDURE — 83735 ASSAY OF MAGNESIUM: CPT

## 2018-10-07 PROCEDURE — 94761 N-INVAS EAR/PLS OXIMETRY MLT: CPT

## 2018-10-07 PROCEDURE — 85610 PROTHROMBIN TIME: CPT

## 2018-10-07 PROCEDURE — P9035 PLATELET PHERES LEUKOREDUCED: HCPCS

## 2018-10-07 PROCEDURE — 99900022

## 2018-10-07 PROCEDURE — 25000003 PHARM REV CODE 250: Performed by: STUDENT IN AN ORGANIZED HEALTH CARE EDUCATION/TRAINING PROGRAM

## 2018-10-07 PROCEDURE — 63600175 PHARM REV CODE 636 W HCPCS: Performed by: NURSE PRACTITIONER

## 2018-10-07 PROCEDURE — 85730 THROMBOPLASTIN TIME PARTIAL: CPT

## 2018-10-07 PROCEDURE — 25000003 PHARM REV CODE 250: Performed by: TRANSPLANT SURGERY

## 2018-10-07 PROCEDURE — 63600175 PHARM REV CODE 636 W HCPCS: Mod: JG | Performed by: STUDENT IN AN ORGANIZED HEALTH CARE EDUCATION/TRAINING PROGRAM

## 2018-10-07 PROCEDURE — 20000000 HC ICU ROOM

## 2018-10-07 PROCEDURE — 82977 ASSAY OF GGT: CPT

## 2018-10-07 PROCEDURE — P9045 ALBUMIN (HUMAN), 5%, 250 ML: HCPCS | Mod: JG | Performed by: STUDENT IN AN ORGANIZED HEALTH CARE EDUCATION/TRAINING PROGRAM

## 2018-10-07 PROCEDURE — 99233 SBSQ HOSP IP/OBS HIGH 50: CPT | Mod: ,,, | Performed by: NURSE PRACTITIONER

## 2018-10-07 PROCEDURE — 36415 COLL VENOUS BLD VENIPUNCTURE: CPT

## 2018-10-07 PROCEDURE — 80197 ASSAY OF TACROLIMUS: CPT

## 2018-10-07 PROCEDURE — 25000003 PHARM REV CODE 250: Performed by: SURGERY

## 2018-10-07 PROCEDURE — S0028 INJECTION, FAMOTIDINE, 20 MG: HCPCS | Performed by: SURGERY

## 2018-10-07 PROCEDURE — 85025 COMPLETE CBC W/AUTO DIFF WBC: CPT

## 2018-10-07 PROCEDURE — 84100 ASSAY OF PHOSPHORUS: CPT

## 2018-10-07 PROCEDURE — 80053 COMPREHEN METABOLIC PANEL: CPT

## 2018-10-07 PROCEDURE — 86901 BLOOD TYPING SEROLOGIC RH(D): CPT

## 2018-10-07 PROCEDURE — 63600175 PHARM REV CODE 636 W HCPCS: Performed by: TRANSPLANT SURGERY

## 2018-10-07 RX ORDER — GLUCAGON 1 MG
1 KIT INJECTION
Status: DISCONTINUED | OUTPATIENT
Start: 2018-10-07 | End: 2018-10-14

## 2018-10-07 RX ORDER — POLYETHYLENE GLYCOL 3350 17 G/17G
17 POWDER, FOR SOLUTION ORAL DAILY
Status: DISCONTINUED | OUTPATIENT
Start: 2018-10-07 | End: 2018-10-12

## 2018-10-07 RX ORDER — IBUPROFEN 200 MG
24 TABLET ORAL
Status: DISCONTINUED | OUTPATIENT
Start: 2018-10-07 | End: 2018-10-14

## 2018-10-07 RX ORDER — BISACODYL 10 MG
10 SUPPOSITORY, RECTAL RECTAL DAILY PRN
Status: DISCONTINUED | OUTPATIENT
Start: 2018-10-07 | End: 2018-11-16

## 2018-10-07 RX ORDER — ALBUMIN HUMAN 50 G/1000ML
12.5 SOLUTION INTRAVENOUS ONCE
Status: COMPLETED | OUTPATIENT
Start: 2018-10-07 | End: 2018-10-07

## 2018-10-07 RX ORDER — INSULIN ASPART 100 [IU]/ML
2-4 INJECTION, SOLUTION INTRAVENOUS; SUBCUTANEOUS
Status: DISCONTINUED | OUTPATIENT
Start: 2018-10-07 | End: 2018-10-09

## 2018-10-07 RX ORDER — INSULIN ASPART 100 [IU]/ML
0-4 INJECTION, SOLUTION INTRAVENOUS; SUBCUTANEOUS
Status: DISCONTINUED | OUTPATIENT
Start: 2018-10-07 | End: 2018-10-14

## 2018-10-07 RX ORDER — IBUPROFEN 200 MG
16 TABLET ORAL
Status: DISCONTINUED | OUTPATIENT
Start: 2018-10-07 | End: 2018-10-14

## 2018-10-07 RX ADMIN — SULFAMETHOXAZOLE AND TRIMETHOPRIM 1 TABLET: 400; 80 TABLET ORAL at 08:10

## 2018-10-07 RX ADMIN — ALBUMIN HUMAN 12.5 G: 0.05 INJECTION, SOLUTION INTRAVENOUS at 08:10

## 2018-10-07 RX ADMIN — INSULIN ASPART 2 UNITS: 100 INJECTION, SOLUTION INTRAVENOUS; SUBCUTANEOUS at 05:10

## 2018-10-07 RX ADMIN — INSULIN ASPART 2 UNITS: 100 INJECTION, SOLUTION INTRAVENOUS; SUBCUTANEOUS at 09:10

## 2018-10-07 RX ADMIN — FAMOTIDINE 20 MG: 10 INJECTION, SOLUTION INTRAVENOUS at 08:10

## 2018-10-07 RX ADMIN — HEPARIN SODIUM 5000 UNITS: 5000 INJECTION, SOLUTION INTRAVENOUS; SUBCUTANEOUS at 09:10

## 2018-10-07 RX ADMIN — SODIUM CHLORIDE, SODIUM LACTATE, POTASSIUM CHLORIDE, AND CALCIUM CHLORIDE: .6; .31; .03; .02 INJECTION, SOLUTION INTRAVENOUS at 02:10

## 2018-10-07 RX ADMIN — PIPERACILLIN AND TAZOBACTAM 4.5 G: 4; .5 INJECTION, POWDER, LYOPHILIZED, FOR SOLUTION INTRAVENOUS; PARENTERAL at 05:10

## 2018-10-07 RX ADMIN — PIPERACILLIN AND TAZOBACTAM 4.5 G: 4; .5 INJECTION, POWDER, LYOPHILIZED, FOR SOLUTION INTRAVENOUS; PARENTERAL at 08:10

## 2018-10-07 RX ADMIN — METHYLPREDNISOLONE SODIUM SUCCINATE 80 MG: 125 INJECTION, POWDER, FOR SOLUTION INTRAMUSCULAR; INTRAVENOUS at 08:10

## 2018-10-07 RX ADMIN — HEPARIN SODIUM 5000 UNITS: 5000 INJECTION, SOLUTION INTRAVENOUS; SUBCUTANEOUS at 07:10

## 2018-10-07 RX ADMIN — PIPERACILLIN AND TAZOBACTAM 4.5 G: 4; .5 INJECTION, POWDER, LYOPHILIZED, FOR SOLUTION INTRAVENOUS; PARENTERAL at 02:10

## 2018-10-07 RX ADMIN — FAMOTIDINE 20 MG: 10 INJECTION, SOLUTION INTRAVENOUS at 09:10

## 2018-10-07 RX ADMIN — POLYETHYLENE GLYCOL 3350 17 G: 17 POWDER, FOR SOLUTION ORAL at 04:10

## 2018-10-07 RX ADMIN — OXYCODONE HYDROCHLORIDE 5 MG: 5 TABLET ORAL at 08:10

## 2018-10-07 RX ADMIN — Medication 4 MG: at 05:10

## 2018-10-07 RX ADMIN — ENTECAVIR 0.5 MG: 0.5 TABLET ORAL at 08:10

## 2018-10-07 RX ADMIN — Medication: at 10:10

## 2018-10-07 RX ADMIN — INSULIN ASPART 1 UNITS: 100 INJECTION, SOLUTION INTRAVENOUS; SUBCUTANEOUS at 01:10

## 2018-10-07 RX ADMIN — HEPARIN SODIUM 5000 UNITS: 5000 INJECTION, SOLUTION INTRAVENOUS; SUBCUTANEOUS at 02:10

## 2018-10-07 RX ADMIN — Medication 1000 MG: at 10:10

## 2018-10-07 RX ADMIN — Medication 1000 MG: at 09:10

## 2018-10-07 RX ADMIN — FLUCONAZOLE 200 MG: 200 TABLET ORAL at 08:10

## 2018-10-07 RX ADMIN — ACETAMINOPHEN 650 MG: 325 TABLET, FILM COATED ORAL at 02:10

## 2018-10-07 RX ADMIN — BISACODYL 10 MG: 10 SUPPOSITORY RECTAL at 10:10

## 2018-10-07 RX ADMIN — Medication 3 MG: at 08:10

## 2018-10-07 RX ADMIN — HEPATITIS B IMMUNE GLOBULIN (HUMAN) 9999.91 UNITS: 312 INJECTION, SOLUTION INTRAMUSCULAR; INTRAVENOUS at 08:10

## 2018-10-07 RX ADMIN — SODIUM CHLORIDE, SODIUM LACTATE, POTASSIUM CHLORIDE, AND CALCIUM CHLORIDE: .6; .31; .03; .02 INJECTION, SOLUTION INTRAVENOUS at 05:10

## 2018-10-07 RX ADMIN — METHYLPREDNISOLONE SODIUM SUCCINATE 80 MG: 125 INJECTION, POWDER, FOR SOLUTION INTRAMUSCULAR; INTRAVENOUS at 09:10

## 2018-10-07 NOTE — SUBJECTIVE & OBJECTIVE
"Interval HPI:   Overnight events: Remains in ICU, back to OR yesterday for ex lap procedure.  Extubated overnight.  BG well controlled on intensive insulin protocol.  Eating:   NPO  Nausea: No  Hypoglycemia and intervention: No  Fever: No  TPN and/or TF: No    BP (!) 117/57   Pulse 94   Temp 96.7 °F (35.9 °C) (Axillary)   Resp 15   Ht 5' 3" (1.6 m)   Wt 64.2 kg (141 lb 8.6 oz)   LMP  (LMP Unknown)   SpO2 97%   Breastfeeding? No   BMI 25.07 kg/m²     Labs Reviewed and Include    Recent Labs   Lab  10/07/18   0500   GLU  191*   CALCIUM  7.7*   ALBUMIN  2.7*   PROT  4.0*   NA  132*   K  4.4   CO2  21*   CL  102   BUN  44*   CREATININE  1.3   ALKPHOS  127   ALT  1,230*   AST  1,186*   BILITOT  12.7*     Lab Results   Component Value Date    WBC 5.56 10/07/2018    HGB 11.0 (L) 10/07/2018    HCT 31.0 (L) 10/07/2018    MCV 88 10/07/2018    PLT 44 (L) 10/07/2018     No results for input(s): TSH, FREET4 in the last 168 hours.  Lab Results   Component Value Date    HGBA1C 6.0 (H) 10/04/2018       Nutritional status:   Body mass index is 25.07 kg/m².  Lab Results   Component Value Date    ALBUMIN 2.7 (L) 10/07/2018    ALBUMIN 3.0 (L) 10/06/2018    ALBUMIN 3.2 (L) 10/06/2018     Lab Results   Component Value Date    PREALBUMIN 12 (L) 09/17/2018       Estimated Creatinine Clearance: 36.8 mL/min (based on SCr of 1.3 mg/dL).    Accu-Checks  Recent Labs      10/06/18   2202  10/06/18   2308  10/07/18   0010  10/07/18   0120  10/07/18   0206  10/07/18   0305  10/07/18   0411  10/07/18   0451  10/07/18   0710  10/07/18   0800   POCTGLUCOSE  146*  169*  180*  186*  165*  187*  180*  184*  168*  186*       Current Medications and/or Treatments Impacting Glycemic Control  Immunotherapy:    Immunosuppressants         Stop Route Frequency     tacrolimus (PROGRAF) 1 mg/mL oral syringe      -- Oral 2 times daily     mycophenolate mofetil 200 mg/mL suspension 1,000 mg      -- Oral 2 times daily        Steroids:   Hormones (From " admission, onward)    Start     Stop Route Frequency Ordered    10/11/18 0900  predniSONE tablet 20 mg  (methylprednisolone taper panel)      -- Oral Daily 10/05/18 0709    10/10/18 0900  methylPREDNISolone sodium succinate injection 20 mg  (methylprednisolone taper panel)      10/11 0859 IV Every 12 hours 10/05/18 0709    10/09/18 0900  methylPREDNISolone sodium succinate injection 40 mg  (methylprednisolone taper panel)      10/10 0859 IV Every 12 hours 10/05/18 0709    10/08/18 0900  methylPREDNISolone sodium succinate injection 60 mg  (methylprednisolone taper panel)      10/09 0859 IV Every 12 hours 10/05/18 0709    10/07/18 0900  methylPREDNISolone sodium succinate injection 80 mg  (methylprednisolone taper panel)      10/08 0859 IV Every 12 hours 10/05/18 0709    10/05/18 0942  vasopressin (PITRESSIN) 0.2 Units/mL in dextrose 5 % 100 mL infusion      -- IV Continuous 10/05/18 0943        Pressors:    Autonomic Drugs (From admission, onward)    Start     Stop Route Frequency Ordered    10/06/18 1617  rocuronium (ZEMURON) 10 mg/mL injection     Comments:  Created by cabinet override    10/07 0429   10/06/18 1617        Hyperglycemia/Diabetes Medications:   Antihyperglycemics (From admission, onward)    Start     Stop Route Frequency Ordered    10/05/18 0830  insulin regular (Humulin R) 100 Units in sodium chloride 0.9% 100 mL infusion     Question:  Insulin Rate Adjustment (DO NOT MODIFY ANSWER)  Answer:  [email or web address]    -- IV Continuous 10/05/18 0720

## 2018-10-07 NOTE — SUBJECTIVE & OBJECTIVE
Interval History/Significant Events: Now POD2 after Liver transplant. Yesterday, had takeback to OR due to elevated t-bili (14.3) and bilious appearance of VALORIE drain output. No leak identified. She was extubated and taken to the floor, where she was experiencing hypercapneic hypoxic respiratory failure and reintubated. She was later successfully extubated and is now saturating well on room air. HDS,  overnight.     Follow-up For: Procedure(s) (LRB):  LAPAROTOMY, EXPLORATORY, AFTER LIVER TRANSPLANT (N/A)    Post-Operative Day: 1 Day Post-Op    Objective:     Vital Signs (Most Recent):  Temp: 98 °F (36.7 °C) (10/07/18 0300)  Pulse: 95 (10/07/18 0500)  Resp: (!) 9 (10/07/18 0500)  BP: (!) 117/57 (10/07/18 0400)  SpO2: 98 % (10/07/18 0500) Vital Signs (24h Range):  Temp:  [97.6 °F (36.4 °C)-98.3 °F (36.8 °C)] 98 °F (36.7 °C)  Pulse:  [] 95  Resp:  [8-34] 9  SpO2:  [92 %-100 %] 98 %  BP: (104-180)/() 117/57     Weight: 64.2 kg (141 lb 8.6 oz)  Body mass index is 25.07 kg/m².      Intake/Output Summary (Last 24 hours) at 10/7/2018 0647  Last data filed at 10/7/2018 0400  Gross per 24 hour   Intake 4836 ml   Output 1929 ml   Net 2907 ml       Physical Exam   Constitutional: She appears well-developed.   Eyes: EOM are normal.   Neck: Neck supple.   Right IJ CVC in place    Cardiovascular: Normal rate and regular rhythm.   Pulmonary/Chest: Effort normal. Stridor present. No respiratory distress.   Extubated, on RA    Abdominal: Soft.   VALORIE drain x 2 in place with s/s output   Ascites present   Genitourinary:   Genitourinary Comments: Right femoral art line in place   Bower in place   Neurological: She is alert.   Skin: Skin is warm and dry.   Psychiatric: She has a normal mood and affect.         Lines/Drains/Airways     Central Venous Catheter Line                 Percutaneous Central Line Insertion/Assessment - double lumen  10/04/18 2311 right femoral 2 days         Percutaneous Central Line  Insertion/Assessment - double lumen  10/05/18 right internal jugular 2 days         Percutaneous Central Line Insertion/Assessment - triple lumen  10/04/18 2311 right internal jugular 2 days         Pulmonary Artery Catheter Assessment  10/04/18 2311 2 days          Drain                 Closed/Suction Drain 10/05/18 0426 Right Abdomen Bulb 19 Fr. 2 days         Closed/Suction Drain 10/05/18 0427 Right Abdomen Bulb 19 Fr. 2 days         Urethral Catheter 10/04/18 2310 Non-latex;Straight-tip 16 Fr. 2 days          Arterial Line                 Arterial Line 10/04/18 2240 Left Radial 2 days         Arterial Line 10/05/18 Right Femoral 2 days                Significant Labs:    CBC/Anemia Profile:  Recent Labs   Lab  10/06/18   0753  10/06/18   1601  10/07/18   0500   WBC  9.66  11.35  5.56   HGB  8.4*  10.7*  11.0*   HCT  24.7*  31.3*  31.0*   PLT  37*  70*  44*   MCV  89  92  88   RDW  15.9*  15.5*  15.4*        Chemistries:  Recent Labs   Lab  10/06/18   0318  10/06/18   0803  10/06/18   1601  10/07/18   0500   NA  132*  130*  131*  132*   K  4.2  4.4  4.5  4.4   CL  102  101  100  102   CO2  22*  20*  19*  21*   BUN  37*  37*  38*  44*   CREATININE  0.9  1.0  1.1  1.3   CALCIUM  7.7*  7.6*  7.3*  7.7*   ALBUMIN  3.1*  3.2*  3.0*  2.7*   PROT  4.1*  4.2*  4.4*  4.0*   BILITOT  13.4*  14.3*  12.3*  12.7*   ALKPHOS  140*  133  139*  127   ALT  2,477*  2,210*  1,895*  1,230*   AST  3,622*  2,804*  2,211*  1,186*   MG  1.7   --   2.1  2.2   PHOS  2.5*   --   5.0*  5.2*     Significant Imaging:  I have reviewed and interpreted all pertinent imaging results/findings within the past 24 hours.

## 2018-10-07 NOTE — ASSESSMENT & PLAN NOTE
Managed per primary team  Avoid hypoglycemia    Recent Labs   Lab  10/07/18   0500   ALT  1,230*   AST  1,186*   ALKPHOS  127   BILITOT  12.7*   PROT  4.0*   ALBUMIN  2.7*

## 2018-10-07 NOTE — ASSESSMENT & PLAN NOTE
Mrs. 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    Neuro:   - Sedation: none  - Pain control with PCA     Pulmonary:    -extubated yesterday, reintubated for hypercapneic hypoxemic respiratory distress; now reextubated, tolerating RA  -  CXR daily  - ABG prn  - encourage IS, Nebs prn        Cardiac:  - Drips: none  - HDS  - Monitor hemodynamic status  - lactate 0.7     Renal:   - BUN/CR: 38/1.1 from 37/0.9   - Monitor UOP/ 380mL UOP overnight, net +3L (24 hr)  - albumin 5% 250cc x 1 overnight, 1 this AM  - Bower in place      Fluids/Electrolytes/Nutrition/GI:   - CLD, diet advancement per transplant service   - D5 1/2NS @ 100  - Trend CMP  - Replace Lytes PRN   - LFTs initially trending upwards yesterday afternoon, peaked at 6709 (AST) -> 3622 -> 1186  - t-bili 12.7  -- Liver U/S 10/5: Satisfactory vascular appearance of the liver transplant.  Increased periportal echogenicity.  This suggests hepatic edema.  Two small peritransplant fluid collections.  Mild peritransplant ascites.  - repeat US 10/6 - increased arterial resistive indices, suggestive of edema vs acute rejection vs congestion      Infectious Disease:   - WBC 11.4 from 9.3  - Trend WBC  - Afebrile overnight   - Antibiotics: Zosyn, Bactrim, Valganciclovir  - Steroid taper  - MMF, Prograf  - following Cx (blood, urine)     Hematology/Oncology:  - H&H  stable at 11.1/31.0   - INR 1.6  - Trend CBC/Coags  - See HPI for blood products received      Endocrine:  - Insulin gtt, POCT q1  -- POCT: 160s-270s  - Endocrine on board     PPx:  - SQH  - famotidine     Dispo:  Continue ICU care  Primary: Transplant Liver

## 2018-10-07 NOTE — PLAN OF CARE
Problem: Patient Care Overview  Goal: Plan of Care Review  Outcome: Ongoing (interventions implemented as appropriate)  Reviewed plan of care with patient, spouse and daughter.  used. Pt AAOx4, moves all extremities and follows commands. Room air. Insulin infusing @1.4units/hr. LR infusing @100mL/hr. UO adequate, no BM. Skin intact, free from breakdown. Pt turned q2 hrs for comfort and to decrease pressure points. See flow sheet for further assessment. VSS at this time, will continue to monitor.

## 2018-10-07 NOTE — NURSING
Order given to extubate patient. Extubation explained to patient and family. Patient successfully extubated to 35% venti-mask without issue. Patient now resting calmly in bed. Oxygen saturations stable >92%.

## 2018-10-07 NOTE — PROGRESS NOTES
[facility]  Critical Care - Surgery  Progress Note    Patient Name: Scarlett Reddy  MRN: [id number]  Admission Date: 10/1/2018  Hospital Length of Stay: 5 days  Code Status: Full Code  Attending Provider: Benson Matson MD  Primary Care Provider: Primary Doctor No   Principal Problem: Liver transplanted    Subjective:     Hospital/ICU Course:  10/5 - liver transplant  10/6 - takeback for hyperbilirubinemia, bilious VALORIE output; no biliary leak found; reintubated for respiratory failure, then extubated    Interval History/Significant Events: Now POD2 after Liver transplant. Yesterday, had takeback to OR due to elevated t-bili (14.3) and bilious appearance of VALORIE drain output. No leak identified. She was extubated and taken to the floor, where she was experiencing hypercapneic hypoxic respiratory failure and reintubated. She was later successfully extubated and is now saturating well on room air. HDS,  overnight.     Follow-up For: Procedure(s) (LRB):  LAPAROTOMY, EXPLORATORY, AFTER LIVER TRANSPLANT (N/A)    Post-Operative Day: 1 Day Post-Op    Objective:     Vital Signs (Most Recent):  Temp: 98 °F (36.7 °C) (10/07/18 0300)  Pulse: 95 (10/07/18 0500)  Resp: (!) 9 (10/07/18 0500)  BP: (!) 117/57 (10/07/18 0400)  SpO2: 98 % (10/07/18 0500) Vital Signs (24h Range):  Temp:  [97.6 °F (36.4 °C)-98.3 °F (36.8 °C)] 98 °F (36.7 °C)  Pulse:  [] 95  Resp:  [8-34] 9  SpO2:  [92 %-100 %] 98 %  BP: (104-180)/() 117/57     Weight: 64.2 kg (141 lb 8.6 oz)  Body mass index is 25.07 kg/m².      Intake/Output Summary (Last 24 hours) at 10/7/2018 0647  Last data filed at 10/7/2018 0400  Gross per 24 hour   Intake 4836 ml   Output 1929 ml   Net 2907 ml       Physical Exam   Constitutional: She appears well-developed.   Eyes: EOM are normal.   Neck: Neck supple.   Right IJ CVC in place    Cardiovascular: Normal rate and regular rhythm.   Pulmonary/Chest: Effort normal. Stridor present. No respiratory  distress.   Extubated, on RA    Abdominal: Soft.   VALORIE drain x 2 in place with s/s output   Ascites present   Genitourinary:   Genitourinary Comments: Right femoral art line in place   Bower in place   Neurological: She is alert.   Skin: Skin is warm and dry.   Psychiatric: She has a normal mood and affect.         Lines/Drains/Airways     Central Venous Catheter Line                 Percutaneous Central Line Insertion/Assessment - double lumen  10/04/18 2311 right femoral 2 days         Percutaneous Central Line Insertion/Assessment - double lumen  10/05/18 right internal jugular 2 days         Percutaneous Central Line Insertion/Assessment - triple lumen  10/04/18 2311 right internal jugular 2 days         Pulmonary Artery Catheter Assessment  10/04/18 2311 2 days          Drain                 Closed/Suction Drain 10/05/18 0426 Right Abdomen Bulb 19 Fr. 2 days         Closed/Suction Drain 10/05/18 0427 Right Abdomen Bulb 19 Fr. 2 days         Urethral Catheter 10/04/18 2310 Non-latex;Straight-tip 16 Fr. 2 days          Arterial Line                 Arterial Line 10/04/18 2240 Left Radial 2 days         Arterial Line 10/05/18 Right Femoral 2 days                Significant Labs:    CBC/Anemia Profile:  Recent Labs   Lab  10/06/18   0753  10/06/18   1601  10/07/18   0500   WBC  9.66  11.35  5.56   HGB  8.4*  10.7*  11.0*   HCT  24.7*  31.3*  31.0*   PLT  37*  70*  44*   MCV  89  92  88   RDW  15.9*  15.5*  15.4*        Chemistries:  Recent Labs   Lab  10/06/18   0318  10/06/18   0803  10/06/18   1601  10/07/18   0500   NA  132*  130*  131*  132*   K  4.2  4.4  4.5  4.4   CL  102  101  100  102   CO2  22*  20*  19*  21*   BUN  37*  37*  38*  44*   CREATININE  0.9  1.0  1.1  1.3   CALCIUM  7.7*  7.6*  7.3*  7.7*   ALBUMIN  3.1*  3.2*  3.0*  2.7*   PROT  4.1*  4.2*  4.4*  4.0*   BILITOT  13.4*  14.3*  12.3*  12.7*   ALKPHOS  140*  133  139*  127   ALT  2,477*  2,210*  1,895*  1,230*   AST  3,622*  2,804*  2,211*   1,186*   MG  1.7   --   2.1  2.2   PHOS  2.5*   --   5.0*  5.2*     Significant Imaging:  I have reviewed and interpreted all pertinent imaging results/findings within the past 24 hours.    Assessment/Plan:     * Liver transplanted     Mrs. 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    Neuro:   - Sedation: none  - Pain control with PCA     Pulmonary:    -extubated yesterday, reintubated for hypercapneic hypoxemic respiratory distress; now reextubated, tolerating RA  -  CXR daily  - ABG prn  - encourage IS, Nebs prn        Cardiac:  - Drips: none  - HDS  - Monitor hemodynamic status  - lactate 0.7     Renal:   - BUN/CR: 38/1.1 from 37/0.9   - Monitor UOP/ 380mL UOP overnight, net +3L (24 hr)  - albumin 5% 250cc x 1 overnight, 1 this AM  - Bower in place      Fluids/Electrolytes/Nutrition/GI:   - NPO, diet advancement per transplant service   - D5 1/2NS @ 100  - Trend CMP  - Replace Lytes PRN   - LFTs initially trending upwards yesterday afternoon, peaked at 6709 (AST) -> 3622 -> 1186  - t-bili 12.7  -- Liver U/S 10/5: Satisfactory vascular appearance of the liver transplant.  Increased periportal echogenicity.  This suggests hepatic edema.  Two small peritransplant fluid collections.  Mild peritransplant ascites.  - repeat US 10/6 - increased arterial resistive indices, suggestive of edema vs acute rejection vs congestion      Infectious Disease:   - WBC 11.4 from 9.3  - Trend WBC  - Afebrile overnight   - Antibiotics: Zosyn, Bactrim, Valganciclovir  - Steroid taper  - MMF, Prograf  - following Cx (blood, urine)     Hematology/Oncology:  - H&H  stable at 11.1/31.0   - INR 1.6  - Trend CBC/Coags  - See HPI for blood products received      Endocrine:  - Insulin gtt, POCT q1  -- POCT: 160s-270s  - Endocrine on board     PPx:  -  SQH  - famotidine     Dispo:  Continue ICU care  Primary: Transplant Liver                  Critical care was time spent personally by me on the following activities: development of treatment plan with patient or surrogate and bedside caregivers, discussions with consultants, evaluation of patient's response to treatment, examination of patient, ordering and performing treatments and interventions, ordering and review of laboratory studies, ordering and review of radiographic studies, pulse oximetry, re-evaluation of patient's condition.  This critical care time did not overlap with that of any other provider or involve time for any procedures.     River Rueda MD  Critical Care - Surgery  [facility]

## 2018-10-07 NOTE — ASSESSMENT & PLAN NOTE
Mrs. 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    Neuro:   - Sedation: none  - Pain control with PCA     Pulmonary:    -extubated yesterday, reintubated for hypercapneic hypoxemic respiratory distress; now reextubated, tolerating RA  -  CXR daily  - ABG prn  - encourage IS, Nebs prn        Cardiac:  - Drips: none  - HDS  - 500 cc albumin today  - Monitor hemodynamic status     Renal:   - BUN/CR: 60/1.4 from 38/1.1  - Monitor UOP/ 380mL UOP overnight, net -240 cc (24 hr)  - albumin 500 cc this AM  - Bower in place      Fluids/Electrolytes/Nutrition/GI:   - diabetic diet  - LR @ 100  - Trend CMP  - Replace Lytes PRN   - Liver enzymes improving (AST 1200 -> 320, ALT 1200 -> 550)  - t-bili 16.4 from 12.7  -- Liver U/S 10/5: Satisfactory vascular appearance of the liver transplant.  Increased periportal echogenicity.  This suggests hepatic edema.  Two small peritransplant fluid collections.  Mild peritransplant ascites.  - repeat US 10/6 - increased arterial resistive indices, suggestive of edema vs acute rejection vs congestion      Infectious Disease:   - WBC 6.4 from 5.6  - Trend WBC  - Afebrile overnight   - Antibiotics: Zosyn, Bactrim, Valganciclovir, diflucan, entecavir  - Steroid taper  - MMF, Prograf  - UCx 10/4 grew Zosyn-sensitive Klebsiella     Hematology/Oncology:  - H&H  stable at 10.6/32  - INR 1.3  - Trend CBC/Coags  - See HPI for blood products received      Endocrine:  - Insulin gtt, POCT q1  -- POCT: 160s-260s  - Endocrine on board     PPx:  - SQH  - famotidine     Dispo:  Continue ICU care; possible stepdown today vs tomorrow  Primary: Transplant Liver

## 2018-10-07 NOTE — ASSESSMENT & PLAN NOTE
BG goal 140 - 180   Continue IV insulin infusion protocol  Requires intensive BG monitoring while on protocol     ADDENDUM: Convert from intensive to transition IV insulin drip at 1.4 u/hr, add novolog 2-4 units with meals, add low dose correction scale insulin.  BG monitoring AC/HS/0200    Discharge planning:  TBD

## 2018-10-07 NOTE — NURSING
Patient awake and resting comfortably this morning. Family at bedside, overnight updates given. Patient extubated last night and weaned to room air, tolerating well. Vss, afebrile. Urine output adequate throughout, 250ml 5% albumin given early evening. VALORIE drains emptied and recorded q4 and prn. Patient passed bedside swallow evaluation and was able to take PO pain medication with adequate relief of pain. Using PCA pump appropriately for breakthrough pain. IV fluids and insulin infusion continued as ordered with q1h blood glucose monitoring. Patient states she is comfortable with no acute needs at this time.

## 2018-10-07 NOTE — PROGRESS NOTES
[facility]  Liver Transplant  Progress Note    Patient Name: Scarlett Reddy  MRN: [id number]  Admission Date: 10/1/2018  Hospital Length of Stay: 5 days  Code Status: Full Code  Primary Care Provider: Primary Doctor No  Post-Op Day 1 Day Post-Op    ORGAN:   LIVER  Disease Etiology: Cirrhosis: Type B and D  Donor Type:    - Brain Death  CDC High Risk:   No  Donor CMV Status:   Donor CMV Status: Positive  Donor HBcAB:   Negative  Donor HCV Status:   Negative  Donor HBV SETH: Negative  Donor HCV SETH: Negative  Whole or Partial: Whole Liver  Biliary Anastomosis: End to End  Arterial Anatomy: Replaced Left Hepatic and Right Hepatic    Subjective:     Interval History:  POD2 Liver Txp, POD1 take-back for concern for biliary leak  Resp distress on return to ICU, re intubated  Extubated later yesterday evening without complication  NAEO    Scheduled Meds:   acetaminophen  650 mg Oral Q8H    entecavir  0.5 mg Oral Daily    ergocalciferol  50,000 Units Oral Q7 Days    famotidine (PF)  20 mg Intravenous Q12H    fluconazole  200 mg Oral Daily    heparin (porcine)  5,000 Units Subcutaneous Q8H    hepatitis B immune globulin (HEPA-RALPH B) IVPB  9,999.912 Units Intravenous Daily    lidocaine (PF) 10 mg/ml (1%)  1 mL Other Once    methylPREDNISolone sodium succinate  80 mg Intravenous Q12H    Followed by    [START ON 10/8/2018] methylPREDNISolone sodium succinate  60 mg Intravenous Q12H    Followed by    [START ON 10/9/2018] methylPREDNISolone sodium succinate  40 mg Intravenous Q12H    Followed by    [START ON 10/10/2018] methylPREDNISolone sodium succinate  20 mg Intravenous Q12H    Followed by    [START ON 10/11/2018] predniSONE  20 mg Oral Daily    mycophenolate mofetil  1,000 mg Oral BID    piperacillin-tazobactam (ZOSYN) IVPB  4.5 g Intravenous Q8H    sulfamethoxazole-trimethoprim 400-80mg  1 tablet Oral Daily    tacrolimus  3 mg Oral BID    [START ON 10/15/2018] valganciclovir 50 mg/ml   450 mg Per NG tube Daily     Continuous Infusions:   hydromorphone in 0.9 % NaCl 6 mg/30 ml      insulin (HUMAN R) infusion (adults) 1.4 Units/hr (10/07/18 0400)    lactated ringers 100 mL/hr at 10/07/18 0400    phenylephrine Stopped (10/05/18 1500)    vasopressin (PITRESSIN) infusion Stopped (10/05/18 1900)     PRN Meds:sodium chloride, calcium gluconate IVPB, calcium gluconate IVPB, calcium gluconate IVPB, dextrose 50%, dextrose 50%, magnesium sulfate IVPB, magnesium sulfate IVPB, naloxone, ondansetron, oxyCODONE, potassium chloride in water **AND** potassium chloride in water **AND** potassium chloride in water, promethazine (PHENERGAN) IVPB, sodium chloride 0.9%, sodium phosphate IVPB, sodium phosphate IVPB, sodium phosphate IVPB    Review of Systems   Unable to perform ROS: Other     Objective:     Vital Signs (Most Recent):  Temp: 98 °F (36.7 °C) (10/07/18 0300)  Pulse: 92 (10/07/18 0400)  Resp: (!) 9 (10/07/18 0400)  BP: (!) 117/57 (10/07/18 0400)  SpO2: 98 % (10/07/18 0400) Vital Signs (24h Range):  Temp:  [97.6 °F (36.4 °C)-98.3 °F (36.8 °C)] 98 °F (36.7 °C)  Pulse:  [] 92  Resp:  [8-34] 9  SpO2:  [92 %-100 %] 98 %  BP: ()/() 117/57     Weight: 64.2 kg (141 lb 8.6 oz)  Body mass index is 25.07 kg/m².    Intake/Output - Last 3 Shifts       10/05 0700 - 10/06 0659 10/06 0700 - 10/07 0659    P.O. 600     I.V. (mL/kg) 4256 (69.5) 3526 (54.9)    Blood  810    NG/     IV Piggyback 1152 500    Total Intake(mL/kg) 6208 (101.4) 4836 (75.3)    Urine (mL/kg/hr) 1928 (1.3) 910 (0.6)    Drains 2003 969    Blood  50    Total Output 3931 1929    Net +2277 +2907                Physical Exam   Constitutional: She appears well-developed. No distress.   HENT:   Head: Normocephalic and atraumatic.   Eyes: EOM are normal. Pupils are equal, round, and reactive to light.   Neck: Neck supple. No tracheal deviation present.   Cardiovascular: Normal rate, regular rhythm and intact distal pulses.    Pulmonary/Chest: Effort normal. No respiratory distress.   Abdominal: Soft. She exhibits no distension. There is tenderness (appropriate).   2 RLQ VALORIE drains in place   Musculoskeletal: She exhibits no edema.   Neurological: She is alert.   Skin: Skin is warm and dry.   Psychiatric: She has a normal mood and affect.       Laboratory:  Immunosuppressants         Stop Route Frequency     tacrolimus (PROGRAF) 1 mg/mL oral syringe      -- Oral 2 times daily     mycophenolate mofetil 200 mg/mL suspension 1,000 mg      -- Oral 2 times daily        CBC:   Recent Labs   Lab  10/06/18   1601   WBC  11.35   RBC  3.42*   HGB  10.7*   HCT  31.3*   PLT  70*   MCV  92   MCH  31.3*   MCHC  34.2     CMP:   Recent Labs   Lab  10/06/18   1601   GLU  178*   CALCIUM  7.3*   ALBUMIN  3.0*   PROT  4.4*   NA  131*   K  4.5   CO2  19*   CL  100   BUN  38*   CREATININE  1.1   ALKPHOS  139*   ALT  1,895*   AST  2,211*   BILITOT  12.3*     Coagulation:   Recent Labs   Lab  10/06/18   1601   INR  1.6*   APTT  32.1*     ABGs:   Recent Labs   Lab  10/06/18   2053   PH  7.473*   PCO2  35.0   HCO3  25.6   POCSATURATED  99   BE  2     Labs within the past 24 hours have been reviewed.    Diagnostic Results:  I have personally reviewed all pertinent imaging studies over the past 24hr    Assessment/Plan:   Scarlett Reddy is a 68 y.o. female     Neuro:   - Sedation: Off currently  - Pain control: Tylenol, dPCA  - Moving all extremities to command     Pulmonary:   - Extubated yesterday evening  - FU CXR this morning  - Continue to monitor     Cardiac:  - Off pressors  - Continue to monitor     Renal:   - > 0.5cc/kg/hr UOP  - Trend BMP     Infectious Disease:   - Afebrile  - Trend WBC  - Fluconazole, Vanc, Zosyn  - MMF, Prograf  - Bactrim, Valganciclovir     Hematology/Oncology:  - H&H stable  - Trend CBC     Endocrine:  - Insulin gtt  - Monitor BG     Fluids/Electrolytes/Nutrition/GI:   - CLD  - Trend CMP  - Replace Lytes PRN   - Continue Pepcid BID  -  Zofran for nausea     Dispo:  Continue ICU care    The patients clinical status was discussed at multidisplinary rounds, involving transplant surgery, transplant medicine, pharmacy, nursing, nutrition, and social work.    Eitan Marvin MD  Liver Transplant  [facility]

## 2018-10-07 NOTE — PLAN OF CARE
Problem: Physical Therapy Goal  Goal: Physical Therapy Goal  Goals to be met by: 2018    Patient will increase functional independence with mobility by performin. Supine to sit with Contact Guard Assistance - not met  2. Sit to stand transfer with Supervision using LRAD or no AD - not met  3. Bed to chair transfer with Supervision using LRAD or no AD - not met  4. Gait  x 250 feet with Supervision using LRAD or no AD - not met  5. Stand for 3 minutes with Supervision to increase activity tolerance - not met   6. Lower extremity exercise program x15 reps per handout, with independence - not met    Outcome: Ongoing (interventions implemented as appropriate)  Eval completed and goals appropriate

## 2018-10-07 NOTE — NURSING
Lab reported urine culture positive for Klebseilla. Dr. Charles Grajeda notified. No new orders at this time,

## 2018-10-07 NOTE — PROGRESS NOTES
Parameters were done were adequate,Vt 400 cc,Vc 800 cc,NIF -28, is aware pt was extubated and placed on vent-mask at 35% with adequate sats.

## 2018-10-07 NOTE — PT/OT/SLP EVAL
Physical Therapy Evaluation    Patient Name:  Scarlett Reddy   MRN:  [id number]     Primary language: Tajik; family present to help interpret; pt understandings simple english   S/p 10/5 liver transplant  S/p 10/6 ex lap after liver transplant     Recommendations:     Discharge Recommendations:  home with home health   Discharge Equipment Recommendations: (TBD)   Barriers to discharge: None    Assessment:     Scarlett Reddy is a 68 y.o. female admitted with a medical diagnosis of Liver transplanted.  She presents with the following impairments/functional limitations:  weakness, impaired endurance, impaired self care skills, impaired functional mobilty, gait instability, pain.    Rehab Prognosis:  good; patient would benefit from acute skilled PT services to address these deficits and reach maximum level of function.      Recent Surgery: Procedure(s) (LRB):  LAPAROTOMY, EXPLORATORY, AFTER LIVER TRANSPLANT (N/A) 1 Day Post-Op    Plan:     During this hospitalization, patient to be seen 5 x/week to address the above listed problems via gait training, therapeutic activities, therapeutic exercises, neuromuscular re-education  · Plan of Care Expires:  11/05/18   Plan of Care Reviewed with: patient, family    Subjective     Communicated with RN prior to session and family present in room.  Patient found in bed upon PT entry to room, agreeable to evaluation.      Chief Complaint: pain/anxiety with functional mobility  Patient comments/goals: to get better and return home   Pain/Comfort:  · Pain Rating 1: (back; unrated)  · Pain Addressed 1: Reposition, Distraction    Patients cultural, spiritual, Orthodoxy conflicts given the current situation: none reported     Living Environment:  Pt is international. PTA pt was using rollator for mobility prn and was performing ADLs with assist prn. Prior to admission, patients level of function was mod-I with assist prn.  Equipment used at home: rollator, shower chair.  Upon discharge,  patient will have assistance from family.        Objective:     Patient found with: telemetry, pulse ox (continuous), blood pressure cuff, central line, gonsales catheter, VALORIE drain, arterial line     General Precautions: Standard, fall   Orthopedic Precautions:N/A   Braces: N/A     Exams:  · Cognitive Exam:    · Alert and oriented to person  · Follows simple one step commands  · Primary language: Czech; family present to assist with translation; pt can understanding simple english  · RLE ROM: WFL  · RLE Strength: grossly 3+/5  · LLE ROM: WFL  · LLE Strength: grossly 3+/5    Functional Mobility:  · Bed Mobility:     · Supine to Sit: moderate assistance  · Transfers:     · Sit to Stand:  minimum assistance with no AD from EOB  · Gait: ~5 steps to chair with min A    AM-PAC 6 CLICK MOBILITY  Total Score:14       Therapeutic Activities and Exercises:  Educated pt on PT role/POC  Educated pt on importance of OOB activity  Pt verbalized understanding     T/f to chair to increase tolerance to OOB activity and to create optimal positioning for lung expansion     Patient left up in chair with all lines intact, call button in reach, RN notified and family present.    GOALS:   Multidisciplinary Problems     Physical Therapy Goals        Problem: Physical Therapy Goal    Goal Priority Disciplines Outcome Goal Variances Interventions   Physical Therapy Goal     PT, PT/OT Ongoing (interventions implemented as appropriate)     Description:  Goals to be met by: 2018    Patient will increase functional independence with mobility by performin. Supine to sit with Contact Guard Assistance - not met  2. Sit to stand transfer with Supervision using LRAD or no AD - not met  3. Bed to chair transfer with Supervision using LRAD or no AD - not met  4. Gait  x 250 feet with Supervision using LRAD or no AD - not met  5. Stand for 3 minutes with Supervision to increase activity tolerance - not met   6. Lower extremity exercise program  x15 reps per handout, with independence - not met                      History:     Past Medical History:   Diagnosis Date    Anemia requiring transfusions 8/16/2018    Ascites     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 8/15/2018    Cirrhosis     Cough 8/30/2018    Esophageal varices     Esophageal varices without bleeding 8/15/2018    Hepatic encephalopathy     Hepatitis B     Hepatitis D virus infection     Hypersplenism     Hyponatremia     Hypotension     Liver transplant candidate     Neutropenia     Portal hypertension     Severe malnutrition 8/16/2018    Type 2 diabetes mellitus, with long-term current use of insulin 8/15/2018       Past Surgical History:   Procedure Laterality Date    EGD (ESOPHAGOGASTRODUODENOSCOPY) N/A 8/17/2018    Performed by Travon Delgadillo MD at [facility] ([address] [place] [address])    ESOPHAGOGASTRODUODENOSCOPY N/A 8/17/2018    Procedure: EGD (ESOPHAGOGASTRODUODENOSCOPY);  Surgeon: Travon Delgadillo MD;  Location: [facility] ([address] [place] [address]);  Service: Endoscopy;  Laterality: N/A;    LIVER TRANSPLANT N/A 10/4/2018    Procedure: TRANSPLANT, LIVER;  Surgeon: Yony Burns MD;  Location: [facility] OR [address] [place] [address];  Service: Transplant;  Laterality: N/A;    TRANSPLANT, LIVER N/A 10/4/2018    Performed by Yony Burns MD at [facility] OR [address] [place] [address]       Time Tracking:     PT Received On: 10/07/18  PT Start Time: 1219     PT Stop Time: 1237  PT Total Time (min): 18 min     Billable Minutes: Evaluation 15    Mattie Prescott, PT, DPT  10/7/2018  [phone]

## 2018-10-07 NOTE — PROGRESS NOTES
"[facility]  Endocrinology  Progress Note    Admit Date: 10/1/2018     Reason for Consult: Management of type 2 DM, Hyperglycemia      Surgical Procedure and Date: Liver transplant 10/5/18, Ex lap 10/6/18     Diabetes diagnosis year: 2013     Home Diabetes Medications:  Lantus 18 units HS and novolog 17 units with meals plus correction scale (150-200 +1)        Lab Results   Component Value Date     HGBA1C 6.8 (H) 08/16/2018        How often checking glucose at home? 3-4   BG readings on regimen: 120-150.  Post prandial readings as high as upper 200s  Hypoglycemia on the regimen? yes, none recently   Missed doses on regimen?  No     Diabetes Complications include:     Diabetic peripheral neuropathy      Complicating diabetes co morbidities:   CIRRHOSIS        HPI:  Patient is a 68 y.o. female with a diagnosis of ESLD, listed for liver transplant with meld 23 who underwent live transplant on 10/5/18.  Back to OR on 10/6/18 for ex lap.  Admitted 10/1/18 for hyponatremia s/p paracentesis. She speaks primarily Portuguese. Information obtained from recent past admission and discussion over the phone via  ([employer]). Family not at bedside. Pt request family at bedside for future conversations. Personal has known diagnosis of diabetes, endocrine consulted for diabetes management.        Interval HPI:   Overnight events: Remains in ICU, back to OR yesterday for ex lap procedure.  Extubated overnight.  BG well controlled on intensive insulin protocol.  Eating:   NPO  Nausea: No  Hypoglycemia and intervention: No  Fever: No  TPN and/or TF: No    BP (!) 117/57   Pulse 94   Temp 96.7 °F (35.9 °C) (Axillary)   Resp 15   Ht 5' 3" (1.6 m)   Wt 64.2 kg (141 lb 8.6 oz)   LMP  (LMP Unknown)   SpO2 97%   Breastfeeding? No   BMI 25.07 kg/m²      Labs Reviewed and Include    Recent Labs   Lab  10/07/18   0500   GLU  191*   CALCIUM  7.7*   ALBUMIN  2.7*   PROT  4.0*   NA  132*   K  4.4   CO2  21*   CL  " 102   BUN  44*   CREATININE  1.3   ALKPHOS  127   ALT  1,230*   AST  1,186*   BILITOT  12.7*     Lab Results   Component Value Date    WBC 5.56 10/07/2018    HGB 11.0 (L) 10/07/2018    HCT 31.0 (L) 10/07/2018    MCV 88 10/07/2018    PLT 44 (L) 10/07/2018     No results for input(s): TSH, FREET4 in the last 168 hours.  Lab Results   Component Value Date    HGBA1C 6.0 (H) 10/04/2018       Nutritional status:   Body mass index is 25.07 kg/m².  Lab Results   Component Value Date    ALBUMIN 2.7 (L) 10/07/2018    ALBUMIN 3.0 (L) 10/06/2018    ALBUMIN 3.2 (L) 10/06/2018     Lab Results   Component Value Date    PREALBUMIN 12 (L) 09/17/2018       Estimated Creatinine Clearance: 36.8 mL/min (based on SCr of 1.3 mg/dL).    Accu-Checks  Recent Labs      10/06/18   2202  10/06/18   2308  10/07/18   0010  10/07/18   0120  10/07/18   0206  10/07/18   0305  10/07/18   0411  10/07/18   0451  10/07/18   0710  10/07/18   0800   POCTGLUCOSE  146*  169*  180*  186*  165*  187*  180*  184*  168*  186*       Current Medications and/or Treatments Impacting Glycemic Control  Immunotherapy:    Immunosuppressants         Stop Route Frequency     tacrolimus (PROGRAF) 1 mg/mL oral syringe      -- Oral 2 times daily     mycophenolate mofetil 200 mg/mL suspension 1,000 mg      -- Oral 2 times daily        Steroids:   Hormones (From admission, onward)    Start     Stop Route Frequency Ordered    10/11/18 0900  predniSONE tablet 20 mg  (methylprednisolone taper panel)      -- Oral Daily 10/05/18 0709    10/10/18 0900  methylPREDNISolone sodium succinate injection 20 mg  (methylprednisolone taper panel)      10/11 0859 IV Every 12 hours 10/05/18 0709    10/09/18 0900  methylPREDNISolone sodium succinate injection 40 mg  (methylprednisolone taper panel)      10/10 0859 IV Every 12 hours 10/05/18 0709    10/08/18 0900  methylPREDNISolone sodium succinate injection 60 mg  (methylprednisolone taper panel)      10/09 0859 IV Every 12 hours 10/05/18 0709     10/07/18 0900  methylPREDNISolone sodium succinate injection 80 mg  (methylprednisolone taper panel)      10/08 0859 IV Every 12 hours 10/05/18 0709    10/05/18 0942  vasopressin (PITRESSIN) 0.2 Units/mL in dextrose 5 % 100 mL infusion      -- IV Continuous 10/05/18 0943        Pressors:    Autonomic Drugs (From admission, onward)    Start     Stop Route Frequency Ordered    10/06/18 1617  rocuronium (ZEMURON) 10 mg/mL injection     Comments:  Created by cabinet override    10/07 0429   10/06/18 1617        Hyperglycemia/Diabetes Medications:   Antihyperglycemics (From admission, onward)    Start     Stop Route Frequency Ordered    10/05/18 0830  insulin regular (Humulin R) 100 Units in sodium chloride 0.9% 100 mL infusion     Question:  Insulin Rate Adjustment (DO NOT MODIFY ANSWER)  Answer:  [email or web address] [id number]    -- IV Continuous 10/05/18 0720          ASSESSMENT and PLAN    * Liver transplanted    Managed per primary team  Avoid hypoglycemia    Recent Labs   Lab  10/07/18   0500   ALT  1,230*   AST  1,186*   ALKPHOS  127   BILITOT  12.7*   PROT  4.0*   ALBUMIN  2.7*               Type 2 diabetes mellitus with diabetic polyneuropathy, with long-term current use of insulin    BG goal 140 - 180   Continue IV insulin infusion protocol  Requires intensive BG monitoring while on protocol     ADDENDUM: Convert from intensive to transition IV insulin drip at 1.4 u/hr, add novolog 2-4 units with meals, add low dose correction scale insulin.  BG monitoring //0200    Discharge planning:  TBD        Severe malnutrition    Oral intake encouraged, may affect BG readings        Adverse effect of corticosteroids    On standard steroid taper per transplant team; may elevate BG readings            Prophylactic immunotherapy    May increase insulin resistance.            Be Willams, KEEGAN  Endocrinology  [facility]

## 2018-10-07 NOTE — HOSPITAL COURSE
10/4 - liver transplant  10/6 - takeback for hyperbilirubinemia, bilious VALORIE output; no biliary leak found; reintubated for respiratory failure, then extubated

## 2018-10-08 LAB
ABO + RH BLD: NORMAL
ALBUMIN SERPL BCP-MCNC: 2.4 G/DL
ALLENS TEST: ABNORMAL
ALP SERPL-CCNC: 133 U/L
ALT SERPL W/O P-5'-P-CCNC: 547 U/L
ANION GAP SERPL CALC-SCNC: 12 MMOL/L
APTT BLDCRRT: 33.3 SEC
AST SERPL-CCNC: 320 U/L
BACTERIA SPEC AEROBE CULT: NORMAL
BASOPHILS # BLD AUTO: 0 K/UL
BASOPHILS NFR BLD: 0 %
BILIRUB DIRECT SERPL-MCNC: 11.8 MG/DL
BILIRUB SERPL-MCNC: 16.4 MG/DL
BLD GP AB SCN CELLS X3 SERPL QL: NORMAL
BLD PROD TYP BPU: NORMAL
BLOOD UNIT EXPIRATION DATE: NORMAL
BLOOD UNIT TYPE CODE: 600
BLOOD UNIT TYPE CODE: 6200
BLOOD UNIT TYPE: NORMAL
BUN SERPL-MCNC: 60 MG/DL
CALCIUM SERPL-MCNC: 7.7 MG/DL
CHLORIDE SERPL-SCNC: 100 MMOL/L
CO2 SERPL-SCNC: 19 MMOL/L
CODING SYSTEM: NORMAL
CREAT SERPL-MCNC: 1.4 MG/DL
DELSYS: ABNORMAL
DIFFERENTIAL METHOD: ABNORMAL
DISPENSE STATUS: NORMAL
EOSINOPHIL # BLD AUTO: 0 K/UL
EOSINOPHIL NFR BLD: 0 %
ERYTHROCYTE [DISTWIDTH] IN BLOOD BY AUTOMATED COUNT: 15.8 %
ERYTHROCYTE [SEDIMENTATION RATE] IN BLOOD BY WESTERGREN METHOD: 24 MM/H
EST. GFR  (AFRICAN AMERICAN): 44.2 ML/MIN/1.73 M^2
EST. GFR  (NON AFRICAN AMERICAN): 38.4 ML/MIN/1.73 M^2
FIO2: 100
GGT SERPL-CCNC: 205 U/L
GLUCOSE SERPL-MCNC: 288 MG/DL
HCO3 UR-SCNC: 22 MMOL/L (ref 24–28)
HCT VFR BLD AUTO: 31.6 %
HGB BLD-MCNC: 10.6 G/DL
IMM GRANULOCYTES # BLD AUTO: 0.04 K/UL
IMM GRANULOCYTES NFR BLD AUTO: 0.6 %
INR PPP: 1.3
LYMPHOCYTES # BLD AUTO: 0.1 K/UL
LYMPHOCYTES NFR BLD: 1.6 %
MAGNESIUM SERPL-MCNC: 2 MG/DL
MCH RBC QN AUTO: 30.3 PG
MCHC RBC AUTO-ENTMCNC: 33.5 G/DL
MCV RBC AUTO: 90 FL
MIN VOL: 10.1
MODE: ABNORMAL
MONOCYTES # BLD AUTO: 0.2 K/UL
MONOCYTES NFR BLD: 3.4 %
NEUTROPHILS # BLD AUTO: 6 K/UL
NEUTROPHILS NFR BLD: 94.4 %
NRBC BLD-RTO: 0 /100 WBC
NUM UNITS TRANS FFP: NORMAL
PCO2 BLDA: 42.5 MMHG (ref 35–45)
PEEP: 5
PH SMN: 7.32 [PH] (ref 7.35–7.45)
PHOSPHATE SERPL-MCNC: 4.8 MG/DL
PIP: 20
PLATELET # BLD AUTO: 82 K/UL
PMV BLD AUTO: 11.1 FL
PO2 BLDA: 64 MMHG (ref 40–60)
POC BE: -4 MMOL/L
POC SATURATED O2: 90 % (ref 95–100)
POC TCO2: 23 MMOL/L (ref 24–29)
POCT GLUCOSE: 252 MG/DL (ref 70–110)
POCT GLUCOSE: 260 MG/DL (ref 70–110)
POCT GLUCOSE: 291 MG/DL (ref 70–110)
POCT GLUCOSE: 314 MG/DL (ref 70–110)
POCT GLUCOSE: 358 MG/DL (ref 70–110)
POTASSIUM SERPL-SCNC: 3.9 MMOL/L
PROT SERPL-MCNC: 3.8 G/DL
PROTHROMBIN TIME: 12.8 SEC
RBC # BLD AUTO: 3.5 M/UL
SAMPLE: ABNORMAL
SITE: ABNORMAL
SODIUM SERPL-SCNC: 131 MMOL/L
SP02: 100
TACROLIMUS BLD-MCNC: 26.9 NG/ML
TRANS ERYTHROCYTES VOL PATIENT: NORMAL ML
VT: 400
WBC # BLD AUTO: 6.4 K/UL

## 2018-10-08 PROCEDURE — 82248 BILIRUBIN DIRECT: CPT

## 2018-10-08 PROCEDURE — 83735 ASSAY OF MAGNESIUM: CPT

## 2018-10-08 PROCEDURE — 63600175 PHARM REV CODE 636 W HCPCS: Performed by: NURSE PRACTITIONER

## 2018-10-08 PROCEDURE — 84100 ASSAY OF PHOSPHORUS: CPT

## 2018-10-08 PROCEDURE — 63600175 PHARM REV CODE 636 W HCPCS: Performed by: SURGERY

## 2018-10-08 PROCEDURE — 63600175 PHARM REV CODE 636 W HCPCS: Performed by: STUDENT IN AN ORGANIZED HEALTH CARE EDUCATION/TRAINING PROGRAM

## 2018-10-08 PROCEDURE — 80053 COMPREHEN METABOLIC PANEL: CPT

## 2018-10-08 PROCEDURE — 63600175 PHARM REV CODE 636 W HCPCS: Performed by: TRANSPLANT SURGERY

## 2018-10-08 PROCEDURE — 99232 SBSQ HOSP IP/OBS MODERATE 35: CPT | Mod: ,,, | Performed by: NURSE PRACTITIONER

## 2018-10-08 PROCEDURE — 82977 ASSAY OF GGT: CPT

## 2018-10-08 PROCEDURE — 97110 THERAPEUTIC EXERCISES: CPT

## 2018-10-08 PROCEDURE — 97530 THERAPEUTIC ACTIVITIES: CPT

## 2018-10-08 PROCEDURE — 25000003 PHARM REV CODE 250: Performed by: TRANSPLANT SURGERY

## 2018-10-08 PROCEDURE — 80197 ASSAY OF TACROLIMUS: CPT

## 2018-10-08 PROCEDURE — 25000003 PHARM REV CODE 250: Performed by: STUDENT IN AN ORGANIZED HEALTH CARE EDUCATION/TRAINING PROGRAM

## 2018-10-08 PROCEDURE — 97165 OT EVAL LOW COMPLEX 30 MIN: CPT

## 2018-10-08 PROCEDURE — 25000003 PHARM REV CODE 250: Performed by: NURSE PRACTITIONER

## 2018-10-08 PROCEDURE — 25000003 PHARM REV CODE 250: Performed by: SURGERY

## 2018-10-08 PROCEDURE — 20600001 HC STEP DOWN PRIVATE ROOM

## 2018-10-08 PROCEDURE — 63600175 PHARM REV CODE 636 W HCPCS: Mod: JG | Performed by: STUDENT IN AN ORGANIZED HEALTH CARE EDUCATION/TRAINING PROGRAM

## 2018-10-08 PROCEDURE — P9045 ALBUMIN (HUMAN), 5%, 250 ML: HCPCS | Mod: JG | Performed by: STUDENT IN AN ORGANIZED HEALTH CARE EDUCATION/TRAINING PROGRAM

## 2018-10-08 PROCEDURE — 85610 PROTHROMBIN TIME: CPT

## 2018-10-08 PROCEDURE — S0028 INJECTION, FAMOTIDINE, 20 MG: HCPCS | Performed by: SURGERY

## 2018-10-08 PROCEDURE — 94761 N-INVAS EAR/PLS OXIMETRY MLT: CPT

## 2018-10-08 PROCEDURE — 99233 SBSQ HOSP IP/OBS HIGH 50: CPT | Mod: 24,,, | Performed by: SURGERY

## 2018-10-08 PROCEDURE — 85730 THROMBOPLASTIN TIME PARTIAL: CPT

## 2018-10-08 PROCEDURE — 85025 COMPLETE CBC W/AUTO DIFF WBC: CPT

## 2018-10-08 RX ORDER — FAMOTIDINE 20 MG/1
20 TABLET, FILM COATED ORAL NIGHTLY
Status: DISCONTINUED | OUTPATIENT
Start: 2018-10-08 | End: 2018-10-11

## 2018-10-08 RX ORDER — MIDODRINE HYDROCHLORIDE 5 MG/1
10 TABLET ORAL 3 TIMES DAILY
Status: DISCONTINUED | OUTPATIENT
Start: 2018-10-08 | End: 2018-10-10

## 2018-10-08 RX ORDER — MIRTAZAPINE 7.5 MG/1
7.5 TABLET, FILM COATED ORAL NIGHTLY
Status: DISCONTINUED | OUTPATIENT
Start: 2018-10-08 | End: 2018-11-15

## 2018-10-08 RX ORDER — ALBUMIN HUMAN 50 G/1000ML
25 SOLUTION INTRAVENOUS ONCE
Status: COMPLETED | OUTPATIENT
Start: 2018-10-08 | End: 2018-10-08

## 2018-10-08 RX ORDER — OXYCODONE HYDROCHLORIDE 10 MG/1
10 TABLET ORAL EVERY 4 HOURS PRN
Status: DISCONTINUED | OUTPATIENT
Start: 2018-10-08 | End: 2018-11-15

## 2018-10-08 RX ORDER — CIPROFLOXACIN 250 MG/1
250 TABLET, FILM COATED ORAL EVERY 12 HOURS
Status: DISCONTINUED | OUTPATIENT
Start: 2018-10-08 | End: 2018-10-09

## 2018-10-08 RX ORDER — OXYCODONE HYDROCHLORIDE 5 MG/1
5 TABLET ORAL EVERY 4 HOURS PRN
Status: CANCELLED | OUTPATIENT
Start: 2018-10-08

## 2018-10-08 RX ORDER — ALBUMIN HUMAN 50 G/1000ML
12.5 SOLUTION INTRAVENOUS ONCE
Status: COMPLETED | OUTPATIENT
Start: 2018-10-08 | End: 2018-10-08

## 2018-10-08 RX ORDER — ENTECAVIR 0.5 MG/1
0.5 TABLET, FILM COATED ORAL
Status: DISCONTINUED | OUTPATIENT
Start: 2018-10-10 | End: 2018-10-13

## 2018-10-08 RX ORDER — MYCOPHENOLATE MOFETIL 250 MG/1
1000 CAPSULE ORAL 2 TIMES DAILY
Status: DISCONTINUED | OUTPATIENT
Start: 2018-10-08 | End: 2018-10-31

## 2018-10-08 RX ORDER — SULFAMETHOXAZOLE AND TRIMETHOPRIM 400; 80 MG/1; MG/1
1 TABLET ORAL
Status: DISCONTINUED | OUTPATIENT
Start: 2018-10-10 | End: 2018-10-31

## 2018-10-08 RX ADMIN — MIRTAZAPINE 7.5 MG: 7.5 TABLET ORAL at 09:10

## 2018-10-08 RX ADMIN — INSULIN ASPART 2 UNITS: 100 INJECTION, SOLUTION INTRAVENOUS; SUBCUTANEOUS at 09:10

## 2018-10-08 RX ADMIN — FLUCONAZOLE 200 MG: 200 TABLET ORAL at 08:10

## 2018-10-08 RX ADMIN — ALBUMIN HUMAN 25 G: 0.05 INJECTION, SOLUTION INTRAVENOUS at 08:10

## 2018-10-08 RX ADMIN — HEPARIN SODIUM 5000 UNITS: 5000 INJECTION, SOLUTION INTRAVENOUS; SUBCUTANEOUS at 09:10

## 2018-10-08 RX ADMIN — CIPROFLOXACIN HYDROCHLORIDE 250 MG: 250 TABLET, FILM COATED ORAL at 09:10

## 2018-10-08 RX ADMIN — INSULIN ASPART 2 UNITS: 100 INJECTION, SOLUTION INTRAVENOUS; SUBCUTANEOUS at 08:10

## 2018-10-08 RX ADMIN — FAMOTIDINE 20 MG: 20 TABLET ORAL at 09:10

## 2018-10-08 RX ADMIN — OXYCODONE HYDROCHLORIDE 5 MG: 5 TABLET ORAL at 02:10

## 2018-10-08 RX ADMIN — SULFAMETHOXAZOLE AND TRIMETHOPRIM 1 TABLET: 400; 80 TABLET ORAL at 08:10

## 2018-10-08 RX ADMIN — INSULIN ASPART 3 UNITS: 100 INJECTION, SOLUTION INTRAVENOUS; SUBCUTANEOUS at 11:10

## 2018-10-08 RX ADMIN — METHYLPREDNISOLONE SODIUM SUCCINATE 60 MG: 125 INJECTION, POWDER, FOR SOLUTION INTRAMUSCULAR; INTRAVENOUS at 09:10

## 2018-10-08 RX ADMIN — HEPATITIS B IMMUNE GLOBULIN (HUMAN) 9999.91 UNITS: 312 INJECTION, SOLUTION INTRAMUSCULAR; INTRAVENOUS at 08:10

## 2018-10-08 RX ADMIN — POLYETHYLENE GLYCOL 3350 17 G: 17 POWDER, FOR SOLUTION ORAL at 08:10

## 2018-10-08 RX ADMIN — SODIUM CHLORIDE, SODIUM LACTATE, POTASSIUM CHLORIDE, AND CALCIUM CHLORIDE: .6; .31; .03; .02 INJECTION, SOLUTION INTRAVENOUS at 05:10

## 2018-10-08 RX ADMIN — INSULIN ASPART 4 UNITS: 100 INJECTION, SOLUTION INTRAVENOUS; SUBCUTANEOUS at 03:10

## 2018-10-08 RX ADMIN — SODIUM CHLORIDE 2 UNITS/HR: 9 INJECTION, SOLUTION INTRAVENOUS at 09:10

## 2018-10-08 RX ADMIN — PIPERACILLIN AND TAZOBACTAM 4.5 G: 4; .5 INJECTION, POWDER, LYOPHILIZED, FOR SOLUTION INTRAVENOUS; PARENTERAL at 06:10

## 2018-10-08 RX ADMIN — HEPARIN SODIUM 5000 UNITS: 5000 INJECTION, SOLUTION INTRAVENOUS; SUBCUTANEOUS at 03:10

## 2018-10-08 RX ADMIN — OXYCODONE HYDROCHLORIDE 10 MG: 10 TABLET ORAL at 03:10

## 2018-10-08 RX ADMIN — MIDODRINE HYDROCHLORIDE 10 MG: 5 TABLET ORAL at 03:10

## 2018-10-08 RX ADMIN — HEPARIN SODIUM 5000 UNITS: 5000 INJECTION, SOLUTION INTRAVENOUS; SUBCUTANEOUS at 05:10

## 2018-10-08 RX ADMIN — Medication 1000 MG: at 09:10

## 2018-10-08 RX ADMIN — ENTECAVIR 0.5 MG: 0.5 TABLET ORAL at 08:10

## 2018-10-08 RX ADMIN — INSULIN ASPART 2 UNITS: 100 INJECTION, SOLUTION INTRAVENOUS; SUBCUTANEOUS at 03:10

## 2018-10-08 RX ADMIN — INSULIN ASPART 2 UNITS: 100 INJECTION, SOLUTION INTRAVENOUS; SUBCUTANEOUS at 07:10

## 2018-10-08 RX ADMIN — METHYLPREDNISOLONE SODIUM SUCCINATE 60 MG: 125 INJECTION, POWDER, FOR SOLUTION INTRAMUSCULAR; INTRAVENOUS at 08:10

## 2018-10-08 RX ADMIN — PIPERACILLIN AND TAZOBACTAM 4.5 G: 4; .5 INJECTION, POWDER, LYOPHILIZED, FOR SOLUTION INTRAVENOUS; PARENTERAL at 08:10

## 2018-10-08 RX ADMIN — INSULIN ASPART 3 UNITS: 100 INJECTION, SOLUTION INTRAVENOUS; SUBCUTANEOUS at 02:10

## 2018-10-08 RX ADMIN — MIDODRINE HYDROCHLORIDE 10 MG: 5 TABLET ORAL at 09:10

## 2018-10-08 RX ADMIN — CIPROFLOXACIN HYDROCHLORIDE 250 MG: 250 TABLET, FILM COATED ORAL at 11:10

## 2018-10-08 RX ADMIN — ALBUMIN HUMAN 12.5 G: 0.05 INJECTION, SOLUTION INTRAVENOUS at 11:10

## 2018-10-08 RX ADMIN — FAMOTIDINE 20 MG: 10 INJECTION, SOLUTION INTRAVENOUS at 08:10

## 2018-10-08 RX ADMIN — PIPERACILLIN AND TAZOBACTAM 4.5 G: 4; .5 INJECTION, POWDER, LYOPHILIZED, FOR SOLUTION INTRAVENOUS; PARENTERAL at 01:10

## 2018-10-08 RX ADMIN — MYCOPHENOLATE MOFETIL 1000 MG: 250 CAPSULE ORAL at 09:10

## 2018-10-08 RX ADMIN — POTASSIUM CHLORIDE 40 MEQ: 400 INJECTION, SOLUTION INTRAVENOUS at 05:10

## 2018-10-08 NOTE — PT/OT/SLP EVAL
Occupational Therapy   Evaluation    Name: Scarlett Reddy  MRN: [id number]  Admitting Diagnosis:  Liver transplanted 2 Days Post-Op    Recommendations:     Discharge Recommendations: home with home health  Discharge Equipment Recommendations:  none  Barriers to discharge:  None    History:     Occupational Profile:  Living Environment: lives with  in [facility]; pt and family have been staying at the [facility]  Previous level of function: Mod (I) with ADL 2* increased time with assistance from family at times; Mod (I) for HH ambulation using rollator  Roles and Routines: mother; wife  Equipment Used at Home:  rollator, shower chair  Assistance upon Discharge: family can assist    Past Medical History:   Diagnosis Date    Anemia requiring transfusions 8/16/2018    Ascites     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 8/15/2018    Cirrhosis     Cough 8/30/2018    Esophageal varices     Esophageal varices without bleeding 8/15/2018    Hepatic encephalopathy     Hepatitis B     Hepatitis D virus infection     Hypersplenism     Hyponatremia     Hypotension     Liver transplant candidate     Neutropenia     Portal hypertension     Severe malnutrition 8/16/2018    Type 2 diabetes mellitus, with long-term current use of insulin 8/15/2018       Past Surgical History:   Procedure Laterality Date    EGD (ESOPHAGOGASTRODUODENOSCOPY) N/A 8/17/2018    [facility] ([address] [place] [address])    ESOPHAGOGASTRODUODENOSCOPY N/A 8/17/2018    Procedure: EGD (ESOPHAGOGASTRODUODENOSCOPY);  Surgeon: Travon Delgadillo MD;  Location: [address]);  Service: Endoscopy;  Laterality: N/A;    EXPLORATORY LAPAROTOMY AFTER LIVER TRANSPLANTATION N/A 10/6/2018    Procedure: LAPAROTOMY, EXPLORATORY, AFTER LIVER TRANSPLANT;  Surgeon: Benson Matson MD;  Location: [address];  Service: Transplant;  Laterality: N/A;    LAPAROTOMY, EXPLORATORY, AFTER LIVER TRANSPLANT N/A 10/6/2018     Performed by Benson Matson MD at [facility] OR [address] [place] [address]    LIVER TRANSPLANT N/A 10/4/2018    Procedure: TRANSPLANT, LIVER;  Surgeon: Yony Burns MD;  Location: [facility] OR [address] [place] [address];  Service: Transplant;  Laterality: N/A;    TRANSPLANT, LIVER N/A 10/4/2018    Performed by Yony Burns MD at [facility] OR [address] [place] [address]       Subjective     Chief Complaint: Pt tired and wanting to go back to bed  Patient/Family Comments/goals: to get better    Pain/Comfort:  · Pain Rating 1: 0/10  · Pain Rating Post-Intervention 1: 0/10    Patients cultural, spiritual, Voodoo conflicts given the current situation: none reported    Objective:     Communicated with: RN prior to session.  Patient found seated on bedside commode with  blood pressure cuff, telemetry, pulse ox (continuous), central line upon OT entry to room.    General Precautions: Standard, fall   Orthopedic Precautions:    Braces:       Occupational Performance:    Bed Mobility:    · Patient completed Sit to Supine with minimum assistance    Functional Mobility/Transfers:  · Patient completed Sit <> Stand Transfer with contact guard assistance  with  no assistive device   · Patient completed Bed <> Chair Transfer using Step Transfer technique with contact guard assistance with no assistive device  · Functional Mobility: CGA from bedside commode to bed, then back and forth    Activities of Daily Living:  · Feeding:  independence    · Grooming: contact guard assistance seated EOB  · Upper Body Dressing: minimum assistance    · Lower Body Dressing: moderate assistance    · Toileting: maximal assistance      Cognitive/Visual Perceptual:  Oriented to: Person, Place, Time and Situation  Follows Commands/attention: Follows multistep  commands  Communication: clear/fluent; speaks Georgian  Safety awareness/insight to disability: intact  Coping skills/emotional control: Appropriate to situation    Physical Exam:  Postural examination/scapula alignment:    -       No postural  "abnormalities identified  Sensation:    -       Intact  Upper Extremity Range of Motion:     -       Right Upper Extremity: WFL  -       Left Upper Extremity: WFL  Upper Extremity Strength:    -       Right Upper Extremity: WFL  -       Left Upper Extremity: WFL   Strength:    -       Right Upper Extremity: WFL  -       Left Upper Extremity: WFL  Fine Motor Coordination:    -       Intact  Gross motor coordination:   WFL      AMPAC 6 Click ADL:  AMPA Total Score: 16    Treatment & Education:  Pt ed on OT POC  Pt performed self-care and mobility as above  Pt performed B UE AROM ex's x 10 reps in major planes  Pt/family ed on importance of increased activity (i.e. Mobility to bathroom, standing for self-care)  Education:    Patient left left sidelying with all lines intact, call button in reach, rn notified and family present    Assessment:     Scarlett Reddy is a 68 y.o. female with a medical diagnosis of Liver transplanted.  She presents with the following performance deficits affecting function: weakness, impaired self care skills, impaired balance, impaired functional mobilty, impaired endurance, gait instability, impaired cardiopulmonary response to activity.      Rehab Prognosis: Good; patient would benefit from acute skilled OT services to address these deficits and reach maximum level of function.         Clinical Decision Makin.  OT Low:  "Pt evaluation falls under low complexity for evaluation coding due to performance deficits noted in 1-3 areas as stated above and 0 co-morbities affecting current functional status. Data obtained from problem focused assessments. No modifications or assistance was required for completion of evaluation. Only brief occupational profile and history review completed."     Plan:     Patient to be seen 4 x/week to address the above listed problems via self-care/home management, therapeutic activities, therapeutic exercises  · Plan of Care Expires: 18  · Plan of " Care Reviewed with: patient, daughter, spouse    This Plan of care has been discussed with the patient who was involved in its development and understands and is in agreement with the identified goals and treatment plan    GOALS:   Multidisciplinary Problems     Occupational Therapy Goals        Problem: Occupational Therapy Goal    Goal Priority Disciplines Outcome Interventions   Occupational Therapy Goal     OT, PT/OT Ongoing (interventions implemented as appropriate)    Description:  Goals to be met by: 10/22/18     Patient will increase functional independence with ADLs by performing:    Feeding with [place].  UE Dressing with Supervision.  LE Dressing with Supervision.  Grooming while standing at sink with Supervision.  Toileting from toilet with Supervision for hygiene and clothing management.   Toilet transfer to toilet with Supervision.                      Time Tracking:     OT Date of Treatment: 10/08/18  OT Start Time: 0955  OT Stop Time: 1014  OT Total Time (min): 19 min    Billable Minutes:Evaluation 10  Therapeutic Exercise 8    ELLIS Suarez  10/8/2018

## 2018-10-08 NOTE — NURSING TRANSFER
Nursing Transfer Note      10/8/2018     Transfer To: [id number]    Transfer via wheelchair    Transfer with cardiac monitoring    Transported by VALENTINA Vera    Medicines sent: insulin and LR infusing, insulin pen given to nurse    Chart send with patient: Yes    Notified: family at bedside    Patient reassessed at: full assessment performed today at 1105 (date, time)    Upon arrival to floor: cardiac monitor applied, patient oriented to room, call bell in reach and bed in lowest position. Report called to VALENTINA Willingham - updates given to charge RN at bedside.

## 2018-10-08 NOTE — PLAN OF CARE
Notified by the [facility] the 10/4/2018 urine culture is positive for ESBL-Klebsiella pneumoniae, follow contact isolation , utilize appropriate PPE for patient care and sanitize hands after PPE removed.  Call the Infection Prevention dept. For isolation discontinuation criteria.

## 2018-10-08 NOTE — SUBJECTIVE & OBJECTIVE
Interval History/Significant Events: Patient had reduced UOP (380 overnight), with orthostasis, tachycardia to 100s, CVP 4. Her abdomen is more distended than previously. Urine cultures came back positive for Klebsiella sensitive to Zosyn, which the patient already is taking. T-bili worsening (12.7 -> 16.4.) Poor BG control (168-262.)Pain controlled on PCA, tolerating RA, off pressors.     Follow-up For: Procedure(s) (LRB):  LAPAROTOMY, EXPLORATORY, AFTER LIVER TRANSPLANT (N/A)    Post-Operative Day: 2 Days Post-Op    Objective:     Vital Signs (Most Recent):  Temp: 97.5 °F (36.4 °C) (10/08/18 0300)  Pulse: 97 (10/08/18 0600)  Resp: (!) 7 (10/08/18 0600)  BP: 127/86 (10/08/18 0600)  SpO2: 97 % (10/08/18 0600) Vital Signs (24h Range):  Temp:  [97.5 °F (36.4 °C)-98 °F (36.7 °C)] 97.5 °F (36.4 °C)  Pulse:  [] 97  Resp:  [7-26] 7  SpO2:  [96 %-99 %] 97 %  BP: (113-152)/(60-86) 127/86     Weight: 64.2 kg (141 lb 8.6 oz)  Body mass index is 25.07 kg/m².      Intake/Output Summary (Last 24 hours) at 10/8/2018 0844  Last data filed at 10/8/2018 0600  Gross per 24 hour   Intake 3827.38 ml   Output 2365 ml   Net 1462.38 ml       Physical Exam    Vents:  Vent Mode: Spont (10/06/18 2111)  Ventilator Initiated: Yes (10/06/18 1657)  Set Rate: 18 bmp (10/06/18 1902)  Vt Set: 400 mL (10/06/18 1902)  Pressure Support: 5 cmH20 (10/06/18 2111)  PEEP/CPAP: 5 cmH20 (10/06/18 2111)  Oxygen Concentration (%): 35 (10/07/18 0000)  Peak Airway Pressure: 11 cmH2O (10/06/18 2111)  Plateau Pressure: 0 cmH20 (10/06/18 2111)  Total Ve: 2.74 mL (10/06/18 2111)  F/VT Ratio<105 (RSBI): (!) 26.91 (10/06/18 2104)    Lines/Drains/Airways     Central Venous Catheter Line                 Percutaneous Central Line Insertion/Assessment - triple lumen  10/04/18 2311 right internal jugular 3 days          Drain                 Closed/Suction Drain 10/05/18 0426 Right Abdomen Bulb 19 Fr. 3 days         Closed/Suction Drain 10/05/18 0427 Right Abdomen  Bulb 19 Fr. 3 days         Urethral Catheter 10/04/18 2310 Non-latex;Straight-tip 16 Fr. 3 days                Significant Labs:    CBC/Anemia Profile:  Recent Labs   Lab  10/07/18   0500  10/07/18   2130  10/08/18   0200   WBC  5.56  9.14  6.40   HGB  11.0*  12.1  10.6*   HCT  31.0*  34.4*  31.6*   PLT  44*  110*  82*   MCV  88  89  90   RDW  15.4*  15.9*  15.8*        Chemistries:  Recent Labs   Lab  10/06/18   1601  10/07/18   0500  10/08/18   0200   NA  131*  132*  131*   K  4.5  4.4  3.9   CL  100  102  100   CO2  19*  21*  19*   BUN  38*  44*  60*   CREATININE  1.1  1.3  1.4   CALCIUM  7.3*  7.7*  7.7*   ALBUMIN  3.0*  2.7*  2.4*   PROT  4.4*  4.0*  3.8*   BILITOT  12.3*  12.7*  16.4*   ALKPHOS  139*  127  133   ALT  1,895*  1,230*  547*   AST  2,211*  1,186*  320*   MG  2.1  2.2  2.0   PHOS  5.0*  5.2*  4.8*       Significant Imaging:  I have reviewed and interpreted all pertinent imaging results/findings within the past 24 hours.

## 2018-10-08 NOTE — PROGRESS NOTES
"[facility]  Endocrinology  Progress Note    Admit Date: 10/1/2018     Reason for Consult: Management of type 2 DM, Hyperglycemia      Surgical Procedure and Date: Liver transplant 10/5/18, Ex lap 10/6/18     Diabetes diagnosis year: 2013     Home Diabetes Medications:  Lantus 18 units HS and novolog 17 units with meals plus correction scale (150-200 +1)        Lab Results   Component Value Date     HGBA1C 6.8 (H) 08/16/2018        How often checking glucose at home? 3-4   BG readings on regimen: 120-150.  Post prandial readings as high as upper 200s  Hypoglycemia on the regimen? yes, none recently   Missed doses on regimen?  No     Diabetes Complications include:     Diabetic peripheral neuropathy      Complicating diabetes co morbidities:   CIRRHOSIS        HPI:  Patient is a 68 y.o. female with a diagnosis of ESLD, listed for liver transplant with meld 23 who underwent live transplant on 10/5/18.  Back to OR on 10/6/18 for ex lap.  Admitted 10/1/18 for hyponatremia s/p paracentesis. She speaks primarily Croatian. Information obtained from recent past admission and discussion over the phone via  ([employer]). Family not at bedside. Pt request family at bedside for future conversations. Personal has known diagnosis of diabetes, endocrine consulted for diabetes management.        Interval HPI:   Overnight events: Remains in ICU.  Standard steroid taper, to receive methylprednisone 60 mg BID today.  BG above goal overnight on transition IV insulin drip.  Eating:   <25%  Nausea: No  Hypoglycemia and intervention: No  Fever: No  TPN and/or TF: No    /86 (BP Location: Left arm, Patient Position: Lying)   Pulse 97   Temp 97.5 °F (36.4 °C) (Oral)   Resp (!) 7   Ht 5' 3" (1.6 m)   Wt 64.2 kg (141 lb 8.6 oz)   LMP  (LMP Unknown)   SpO2 97%   Breastfeeding? No   BMI 25.07 kg/m²      Labs Reviewed and Include    Recent Labs   Lab  10/08/18   0200   GLU  288*   CALCIUM  7.7*   ALBUMIN  " 2.4*   PROT  3.8*   NA  131*   K  3.9   CO2  19*   CL  100   BUN  60*   CREATININE  1.4   ALKPHOS  133   ALT  547*   AST  320*   BILITOT  16.4*     Lab Results   Component Value Date    WBC 6.40 10/08/2018    HGB 10.6 (L) 10/08/2018    HCT 31.6 (L) 10/08/2018    MCV 90 10/08/2018    PLT 82 (L) 10/08/2018     No results for input(s): TSH, FREET4 in the last 168 hours.  Lab Results   Component Value Date    HGBA1C 6.0 (H) 10/04/2018       Nutritional status:   Body mass index is 25.07 kg/m².  Lab Results   Component Value Date    ALBUMIN 2.4 (L) 10/08/2018    ALBUMIN 2.7 (L) 10/07/2018    ALBUMIN 3.0 (L) 10/06/2018     Lab Results   Component Value Date    PREALBUMIN 12 (L) 09/17/2018       Estimated Creatinine Clearance: 34.2 mL/min (based on SCr of 1.4 mg/dL).    Accu-Checks  Recent Labs      10/07/18   0451  10/07/18   0710  10/07/18   0800  10/07/18   0901  10/07/18   1006  10/07/18   1300  10/07/18   1751  10/07/18   2111  10/08/18   0208  10/08/18   0723   POCTGLUCOSE  184*  168*  186*  158*  204*  213*  262*  252*  291*  252*       Current Medications and/or Treatments Impacting Glycemic Control  Immunotherapy:    Immunosuppressants         Stop Route Frequency     mycophenolate mofetil 200 mg/mL suspension 1,000 mg      -- Oral 2 times daily        Steroids:   Hormones (From admission, onward)    Start     Stop Route Frequency Ordered    10/11/18 0900  predniSONE tablet 20 mg  (methylprednisolone taper panel)      -- Oral Daily 10/05/18 0709    10/10/18 0900  methylPREDNISolone sodium succinate injection 20 mg  (methylprednisolone taper panel)      10/11 0859 IV Every 12 hours 10/05/18 0709    10/09/18 0900  methylPREDNISolone sodium succinate injection 40 mg  (methylprednisolone taper panel)      10/10 0859 IV Every 12 hours 10/05/18 0709    10/08/18 0900  methylPREDNISolone sodium succinate injection 60 mg  (methylprednisolone taper panel)      10/09 0859 IV Every 12 hours 10/05/18 0709    10/05/18 0942   vasopressin (PITRESSIN) 0.2 Units/mL in dextrose 5 % 100 mL infusion      -- IV Continuous 10/05/18 0943        Pressors:    Autonomic Drugs (From admission, onward)    Start     Stop Route Frequency Ordered    10/06/18 1617  rocuronium (ZEMURON) 10 mg/mL injection     Comments:  Created by cabinet override    10/07 0429   10/06/18 1617        Hyperglycemia/Diabetes Medications:   Antihyperglycemics (From admission, onward)    Start     Stop Route Frequency Ordered    10/07/18 1200  insulin regular (Humulin R) 100 Units in sodium chloride 0.9% 100 mL infusion      -- IV Continuous 10/07/18 1055    10/07/18 1200  insulin aspart U-100 pen 2-4 Units      -- SubQ 3 times daily with meals 10/07/18 1055    10/07/18 1155  insulin aspart U-100 pen 0-4 Units      -- SubQ As needed (PRN) 10/07/18 1055          ASSESSMENT and PLAN    * Liver transplanted    Managed per primary team  Avoid hypoglycemia    Recent Labs   Lab  10/08/18   0200   ALT  547*   AST  320*   ALKPHOS  133   BILITOT  16.4*   PROT  3.8*   ALBUMIN  2.4*               Type 2 diabetes mellitus with diabetic polyneuropathy, with long-term current use of insulin    BG goal 140 - 180     Increase transition IV insulin drip to 1.8 u/hr  Novolog 2-4 units with meals  Low dose correction scale insulin  BG monitoring //0200    ADDENDUM:  Increase transition insulin drip to 2 u/hr    Monitor for prandial excursions    Discharge planning:  TBD        Severe malnutrition    Oral intake encouraged, may affect BG readings        Adverse effect of corticosteroids    On standard steroid taper per transplant team; may elevate BG readings            Prophylactic immunotherapy    May increase insulin resistance.               Be Willams NP  Endocrinology  [facility]

## 2018-10-08 NOTE — PROGRESS NOTES
This  (SW) presented to patient (pt) for follow up and continuity of care post liver transplant.  The patient was observed to be sitting the chair bedside alert, oriented x4 and communicative.  The patient made appropriate eye contact when communicating with SW.  The pt was accompanied by her caregiver Susan and her .  The pt and family speaks Kiswahili. SW contacted [facility] # [phone] who reported she was enroute to the hospital.  The pt reported she was doing well and will move to [place] on today.  The daughter of the patient reported the plan is to now start working with Physical Therapy. The patient denied any reported increasing depression and or anxiety at this time.  The pt and family appears to be coping well at this time. SW provided emotional support, reflective listening and normalization.  No discharge plans identified at this time. Patient verbalized understanding and agreement with the information reviewed, social work availability, and how to access available resources if needed. SW remains available.

## 2018-10-08 NOTE — PROGRESS NOTES
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 Abdominal distension worsened. Some symptomatic orthostatic hypotension this AM, CVP 4, given albumin. No acute events overnight.     Scheduled Meds:   ciprofloxacin HCl  250 mg Oral Q12H    [START ON 10/10/2018] entecavir  0.5 mg Oral Q48H    ergocalciferol  50,000 Units Oral Q7 Days    famotidine  20 mg Oral QHS    fluconazole  200 mg Oral Daily    heparin (porcine)  5,000 Units Subcutaneous Q8H    hepatitis B immune globulin (HEPA-RALPH B) IVPB  9,999.912 Units Intravenous Daily    insulin aspart U-100  2-4 Units Subcutaneous TIDWM    methylPREDNISolone sodium succinate  60 mg Intravenous Q12H    Followed by    [START ON 10/9/2018] methylPREDNISolone sodium succinate  40 mg Intravenous Q12H    Followed by    [START ON 10/10/2018] methylPREDNISolone sodium succinate  20 mg Intravenous Q12H    Followed by    [START ON 10/11/2018] predniSONE  20 mg Oral Daily    midodrine  10 mg Oral TID    mycophenolate  1,000 mg Oral BID    piperacillin-tazobactam (ZOSYN) IVPB  4.5 g Intravenous Q8H    polyethylene glycol  17 g Oral Daily    [START ON 10/10/2018] sulfamethoxazole-trimethoprim 400-80mg  1 tablet Oral Every Mon, Wed, Fri    [START ON 10/15/2018] valganciclovir 50 mg/ml  450 mg Per NG tube  Daily     Continuous Infusions:   insulin (HUMAN R) infusion (adults) 1.4 Units/hr (10/08/18 1000)    lactated ringers 100 mL/hr at 10/08/18 1000    vasopressin (PITRESSIN) infusion Stopped (10/05/18 1900)     PRN Meds:sodium chloride, bisacodyl, dextrose 50%, dextrose 50%, glucagon (human recombinant), glucose, glucose, insulin aspart U-100, ondansetron, oxyCODONE, oxyCODONE, promethazine (PHENERGAN) IVPB, sodium chloride 0.9%    Review of Systems     Objective:     Vital Signs (Most Recent):  Temp: 97.5 °F (36.4 °C) (10/08/18 0700)  Pulse: 101 (10/08/18 1015)  Resp: (!) 21 (10/08/18 1015)  BP: 112/60 (10/08/18 0900)  SpO2: 97 % (10/08/18 1015) Vital Signs (24h Range):  Temp:  [97.5 °F (36.4 °C)-98 °F (36.7 °C)] 97.5 °F (36.4 °C)  Pulse:  [] 101  Resp:  [7-29] 21  SpO2:  [95 %-100 %] 97 %  BP: (112-152)/(60-86) 112/60     Weight: 64.2 kg (141 lb 8.6 oz)  Body mass index is 25.07 kg/m².    Intake/Output - Last 3 Shifts       10/06 0700 - 10/07 0659 10/07 0700 - 10/08 0659 10/08 0700 - 10/09 0659    P.O.       I.V. (mL/kg) 4770 (74.3) 3346.4 (52.1)     Blood 810 250     NG/GT       IV Piggyback 700 231     Total Intake(mL/kg) 6280 (97.8) 3827.4 (59.6)     Urine (mL/kg/hr) 970 (0.6) 1155 (0.7) 90 (0.3)    Drains 969 1500 42    Stool  0 0    Blood 50      Total Output 1989 2655 132    Net +4291 +1172.4 -132           Stool Occurrence  1 x 2 x          Physical Exam   Constitutional: She is oriented to person, place, and time. She appears well-developed. No distress.   HENT:   Head: Normocephalic and atraumatic.   Eyes: EOM are normal. Pupils are equal, round, and reactive to light. Scleral icterus is present.   Neck: Neck supple. No tracheal deviation present.   Cardiovascular: Regular rhythm and intact distal pulses.   Mild tachycardia   Pulmonary/Chest: Effort normal. No respiratory distress.   Abdominal: Soft. She exhibits distension. There is tenderness (appropriate).   2 RLQ VALORIE drains in place    Musculoskeletal: She exhibits no edema.   Neurological: She is alert and oriented to person, place, and time.   Skin: Skin is warm and dry.   Psychiatric: She has a normal mood and affect.       Laboratory:  Immunosuppressants         Stop Route Frequency     mycophenolate capsule 1,000 mg      -- Oral 2 times daily        CBC:   Recent Labs   Lab  10/08/18   0200   WBC  6.40   RBC  3.50*   HGB  10.6*   HCT  31.6*   PLT  82*   MCV  90   MCH  30.3   MCHC  33.5     CMP:   Recent Labs   Lab  10/08/18   0200   GLU  288*   CALCIUM  7.7*   ALBUMIN  2.4*   PROT  3.8*   NA  131*   K  3.9   CO2  19*   CL  100   BUN  60*   CREATININE  1.4   ALKPHOS  133   ALT  547*   AST  320*   BILITOT  16.4*     Coagulation:   Recent Labs   Lab  10/08/18   0200   INR  1.3*   APTT  33.3*     Labs within the past 24 hours have been reviewed.    Diagnostic Results:  I have personally reviewed all pertinent imaging studies over the past 24hr    Assessment/Plan:     * Liver transplanted    Neuro:   - Off sedation  - Pain control: Tylenol, dPCA  - Moving all extremities to command  - Will d/c PCA, d/c tylenol 2/2 continued rise in T bili, oxycodone prn for pain control     Pulmonary:   - Stable on room air  - Continue to monitor     Cardiac:  - Off pressors  - Some orthostatic hypotension this AM, CVP 4  - Midodrine 10 TID     Renal:   - UOP appropriate for weight, but trailing off with low CVP  - Albumin 500 this AM  - Trend BMP  - Bower out     Infectious Disease:   - Afebrile  - Klebsiella + UTI will add 3d Cipro  - Trend WBC  - Fluconazole, Zosyn  - MMF, Prograf  - Bactrim, Valganciclovir     Hematology/Oncology:  - H&H stable  - Trend CBC     Endocrine:  - SSI, Endocrine on board  - Monitor BG     Fluids/Electrolytes/Nutrition/GI:   - Diabetic diet  - Trend CMP   - Continue Pepcid BID  - Zofran for nausea  - Continue LR @ 100 until tolerating better PO intake     Dispo:  Stable for step down           The patients clinical status was  discussed at multidisplinary rounds, involving transplant surgery, transplant medicine, pharmacy, nursing, nutrition, and social work.    Eitan Marvin MD  Liver Transplant  [facility]

## 2018-10-08 NOTE — PLAN OF CARE
Problem: Patient Care Overview  Goal: Plan of Care Review  Outcome: Ongoing (interventions implemented as appropriate)  Patient aaox4. Bed kept locked, lowest position with 2x side rails up while in bed. nonslip footwear when out of bed. Ambulates with 1x assistance. Up in chair for most of afternoon. Family at bedside attentive to patient's needs. Telemetry monitoring SR-ST. Abdomen distended. Chevron dedrick with staples, painted with betadine. Generalized edema. Right IJT cdi. Insulin gtt at 2units/hr. Diabetic diet. accuchecks ac/hs, insulin given as ordered. Repeated requests by family to check blood glucose because thinks it may be high. LR at 100ml/hr.  Continued IV antibiotics. 2 right VALORIE drains with sersang output. Patient voided post gonsales removal but was unable to measure.  1x complaint of pain with mild relief from prn medication.  Urinalysis and culture to be collected. Contact precautions starts for ESBL in urine.

## 2018-10-08 NOTE — ASSESSMENT & PLAN NOTE
BG goal 140 - 180     Increase transition IV insulin drip to 1.8 u/hr  Novolog 2-4 units with meals  Low dose correction scale insulin  BG monitoring //0200    ADDENDUM:  Increase transition insulin drip to 2 u/hr    Monitor for prandial excursions    Discharge planning:  TBD

## 2018-10-08 NOTE — SUBJECTIVE & OBJECTIVE
Interval History: Urine from 10/4 Klebsiella +. Abdominal distension worsened. Some symptomatic orthostatic hypotension this AM, CVP 4, given albumin. No acute events overnight.     Review of Systems   Constitution: Positive for weakness. Negative for fever.   Gastrointestinal: Positive for bloating and jaundice.     Medications:  Continuous Infusions:   insulin (HUMAN R) infusion (adults) 1.4 Units/hr (10/08/18 1000)    lactated ringers 100 mL/hr at 10/08/18 1000    vasopressin (PITRESSIN) infusion Stopped (10/05/18 1900)     Scheduled Meds:   [START ON 10/10/2018] entecavir  0.5 mg Oral Q48H    ergocalciferol  50,000 Units Oral Q7 Days    famotidine  20 mg Oral QHS    fluconazole  200 mg Oral Daily    heparin (porcine)  5,000 Units Subcutaneous Q8H    hepatitis B immune globulin (HEPA-RALPH B) IVPB  9,999.912 Units Intravenous Daily    insulin aspart U-100  2-4 Units Subcutaneous TIDWM    methylPREDNISolone sodium succinate  60 mg Intravenous Q12H    Followed by    [START ON 10/9/2018] methylPREDNISolone sodium succinate  40 mg Intravenous Q12H    Followed by    [START ON 10/10/2018] methylPREDNISolone sodium succinate  20 mg Intravenous Q12H    Followed by    [START ON 10/11/2018] predniSONE  20 mg Oral Daily    midodrine  10 mg Oral TID    mycophenolate  1,000 mg Oral BID    piperacillin-tazobactam (ZOSYN) IVPB  4.5 g Intravenous Q8H    polyethylene glycol  17 g Oral Daily    [START ON 10/10/2018] sulfamethoxazole-trimethoprim 400-80mg  1 tablet Oral Every Mon, Wed, Fri    [START ON 10/15/2018] valganciclovir 50 mg/ml  450 mg Per NG tube Daily     PRN Meds:sodium chloride, bisacodyl, dextrose 50%, dextrose 50%, glucagon (human recombinant), glucose, glucose, insulin aspart U-100, ondansetron, oxyCODONE, oxyCODONE, promethazine (PHENERGAN) IVPB, sodium chloride 0.9%     Objective:     Vital Signs (Most Recent):  Temp: 97.5 °F (36.4 °C) (10/08/18 0700)  Pulse: 101 (10/08/18 1015)  Resp: (!) 21  (10/08/18 1015)  BP: 112/60 (10/08/18 0900)  SpO2: 97 % (10/08/18 1015) Vital Signs (24h Range):  Temp:  [97.5 °F (36.4 °C)-98 °F (36.7 °C)] 97.5 °F (36.4 °C)  Pulse:  [] 101  Resp:  [7-29] 21  SpO2:  [95 %-100 %] 97 %  BP: (112-152)/(60-86) 112/60     Weight: 64.2 kg (141 lb 8.6 oz)  Body mass index is 25.07 kg/m².    SpO2: 97 %  O2 Device (Oxygen Therapy): room air    Intake/Output - Last 3 Shifts       10/06 0700 - 10/07 0659 10/07 0700 - 10/08 0659 10/08 0700 - 10/09 0659    P.O.       I.V. (mL/kg) 4770 (74.3) 3346.4 (52.1)     Blood 810 250     NG/GT       IV Piggyback 700 231     Total Intake(mL/kg) 6280 (97.8) 3827.4 (59.6)     Urine (mL/kg/hr) 970 (0.6) 1155 (0.7) 90 (0.4)    Drains 969 1500 42    Stool  0 0    Blood 50      Total Output 1989 2655 132    Net +4291 +1172.4 -132           Stool Occurrence  1 x 2 x          Lines/Drains/Airways     Central Venous Catheter Line                 Percutaneous Central Line Insertion/Assessment - triple lumen  10/04/18 2311 right internal jugular 3 days          Drain                 Closed/Suction Drain 10/05/18 0426 Right Abdomen Bulb 19 Fr. 3 days         Closed/Suction Drain 10/05/18 0427 Right Abdomen Bulb 19 Fr. 3 days         Urethral Catheter 10/04/18 2310 Non-latex;Straight-tip 16 Fr. 3 days                Physical Exam   Constitutional: She is oriented to person, place, and time. She appears well-developed. No distress.   HENT:   Head: Normocephalic and atraumatic.   Eyes: EOM are normal. Pupils are equal, round, and reactive to light. Scleral icterus is present.   Neck: Neck supple. No tracheal deviation present.   Cardiovascular: Regular rhythm and intact distal pulses.   Mild tachycardia   Pulmonary/Chest: Effort normal. No respiratory distress.   Abdominal: Soft. She exhibits distension. There is tenderness (appropriate).   2 RLQ VALORIE drains in place   Musculoskeletal: She exhibits no edema.   Neurological: She is alert and oriented to person,  place, and time.   Skin: Skin is warm and dry.   Psychiatric: She has a normal mood and affect.       Significant Labs:  CBC:   Recent Labs   Lab  10/08/18   0200   WBC  6.40   RBC  3.50*   HGB  10.6*   HCT  31.6*   PLT  82*   MCV  90   MCH  30.3   MCHC  33.5     CMP:   Recent Labs   Lab  10/08/18   0200   GLU  288*   CALCIUM  7.7*   ALBUMIN  2.4*   PROT  3.8*   NA  131*   K  3.9   CO2  19*   CL  100   BUN  60*   CREATININE  1.4   ALKPHOS  133   ALT  547*   AST  320*   BILITOT  16.4*     Coagulation:   Recent Labs   Lab  10/08/18   0200   INR  1.3*   APTT  33.3*     All pertinent labs from the last 24 hours have been reviewed.    Significant Diagnostics:  I have reviewed all pertinent imaging results/findings within the past 24 hours.

## 2018-10-08 NOTE — ANESTHESIA POSTPROCEDURE EVALUATION
"Anesthesia Post Evaluation    Patient: Scarlett Reddy    Procedure(s) Performed: Procedure(s) (LRB):  LAPAROTOMY, EXPLORATORY, AFTER LIVER TRANSPLANT (N/A)    Final Anesthesia Type: general  Patient location during evaluation: ICU  Patient participation: Yes- Able to Participate  Level of consciousness: awake and alert and oriented  Post-procedure vital signs: reviewed and stable  Pain management: adequate  Airway patency: patent  PONV status at discharge: No PONV  Anesthetic complications: yes  Perioperative Events: reintubation  Leia-operative Events Comments: She desaturated in ICU and was reintubated.  Extubated when I arrived for Post Anesthesia Evaluation this morning on POD #1.  Cardiovascular status: stable  Respiratory status: unassisted, spontaneous ventilation and room air  Hydration status: euvolemic  Follow-up not needed.        Visit Vitals  /86 (BP Location: Left arm, Patient Position: Lying)   Pulse 97   Temp 36.4 °C (97.5 °F) (Oral)   Resp (!) 7   Ht 5' 3" (1.6 m)   Wt 64.2 kg (141 lb 8.6 oz)   LMP  (LMP Unknown)   SpO2 97%   Breastfeeding? No   BMI 25.07 kg/m²       Pain/Annette Score: Pain Assessment Performed: Yes (10/8/2018  6:00 AM)  Presence of Pain: denies (10/8/2018  6:00 AM)  Pain Rating Prior to Med Admin: 6 (10/8/2018  2:40 AM)  Pain Rating Post Med Admin: 0 (10/8/2018  3:40 AM)        "

## 2018-10-08 NOTE — NURSING
Patient assisted up to chair with minimal assistance needed. Complained of dizziness. Dr. Marvin at bedside for early morning rounds. Reviewed UOP, CVP, and vital signs. Albumin ordered and to be given.

## 2018-10-08 NOTE — SUBJECTIVE & OBJECTIVE
"Interval HPI:   Overnight events: Remains in ICU.  Standard steroid taper, to receive methylprednisone 60 mg BID today.  BG above goal overnight on transition IV insulin drip.  Eating:   <25%  Nausea: No  Hypoglycemia and intervention: No  Fever: No  TPN and/or TF: No    /86 (BP Location: Left arm, Patient Position: Lying)   Pulse 97   Temp 97.5 °F (36.4 °C) (Oral)   Resp (!) 7   Ht 5' 3" (1.6 m)   Wt 64.2 kg (141 lb 8.6 oz)   LMP  (LMP Unknown)   SpO2 97%   Breastfeeding? No   BMI 25.07 kg/m²     Labs Reviewed and Include    Recent Labs   Lab  10/08/18   0200   GLU  288*   CALCIUM  7.7*   ALBUMIN  2.4*   PROT  3.8*   NA  131*   K  3.9   CO2  19*   CL  100   BUN  60*   CREATININE  1.4   ALKPHOS  133   ALT  547*   AST  320*   BILITOT  16.4*     Lab Results   Component Value Date    WBC 6.40 10/08/2018    HGB 10.6 (L) 10/08/2018    HCT 31.6 (L) 10/08/2018    MCV 90 10/08/2018    PLT 82 (L) 10/08/2018     No results for input(s): TSH, FREET4 in the last 168 hours.  Lab Results   Component Value Date    HGBA1C 6.0 (H) 10/04/2018       Nutritional status:   Body mass index is 25.07 kg/m².  Lab Results   Component Value Date    ALBUMIN 2.4 (L) 10/08/2018    ALBUMIN 2.7 (L) 10/07/2018    ALBUMIN 3.0 (L) 10/06/2018     Lab Results   Component Value Date    PREALBUMIN 12 (L) 09/17/2018       Estimated Creatinine Clearance: 34.2 mL/min (based on SCr of 1.4 mg/dL).    Accu-Checks  Recent Labs      10/07/18   0451  10/07/18   0710  10/07/18   0800  10/07/18   0901  10/07/18   1006  10/07/18   1300  10/07/18   1751  10/07/18   2111  10/08/18   0208  10/08/18   0723   POCTGLUCOSE  184*  168*  186*  158*  204*  213*  262*  252*  291*  252*       Current Medications and/or Treatments Impacting Glycemic Control  Immunotherapy:    Immunosuppressants         Stop Route Frequency     mycophenolate mofetil 200 mg/mL suspension 1,000 mg      -- Oral 2 times daily        Steroids:   Hormones (From admission, onward)    " Start     Stop Route Frequency Ordered    10/11/18 0900  predniSONE tablet 20 mg  (methylprednisolone taper panel)      -- Oral Daily 10/05/18 0709    10/10/18 0900  methylPREDNISolone sodium succinate injection 20 mg  (methylprednisolone taper panel)      10/11 0859 IV Every 12 hours 10/05/18 0709    10/09/18 0900  methylPREDNISolone sodium succinate injection 40 mg  (methylprednisolone taper panel)      10/10 0859 IV Every 12 hours 10/05/18 0709    10/08/18 0900  methylPREDNISolone sodium succinate injection 60 mg  (methylprednisolone taper panel)      10/09 0859 IV Every 12 hours 10/05/18 0709    10/05/18 0942  vasopressin (PITRESSIN) 0.2 Units/mL in dextrose 5 % 100 mL infusion      -- IV Continuous 10/05/18 0943        Pressors:    Autonomic Drugs (From admission, onward)    Start     Stop Route Frequency Ordered    10/06/18 1617  rocuronium (ZEMURON) 10 mg/mL injection     Comments:  Created by cabinet override    10/07 0429   10/06/18 1617        Hyperglycemia/Diabetes Medications:   Antihyperglycemics (From admission, onward)    Start     Stop Route Frequency Ordered    10/07/18 1200  insulin regular (Humulin R) 100 Units in sodium chloride 0.9% 100 mL infusion      -- IV Continuous 10/07/18 1055    10/07/18 1200  insulin aspart U-100 pen 2-4 Units      -- SubQ 3 times daily with meals 10/07/18 1055    10/07/18 1155  insulin aspart U-100 pen 0-4 Units      -- SubQ As needed (PRN) 10/07/18 1055

## 2018-10-08 NOTE — PLAN OF CARE
Problem: Occupational Therapy Goal  Goal: Occupational Therapy Goal  Goals to be met by: 10/22/18     Patient will increase functional independence with ADLs by performing:    Feeding with [place].  UE Dressing with Supervision.  LE Dressing with Supervision.  Grooming while standing at sink with Supervision.  Toileting from toilet with Supervision for hygiene and clothing management.   Toilet transfer to toilet with Supervision.    Outcome: Ongoing (interventions implemented as appropriate)  OT eval completed, and above goals established. ELLIS Suarez  10/8/2018

## 2018-10-08 NOTE — ASSESSMENT & PLAN NOTE
Managed per primary team  Avoid hypoglycemia    Recent Labs   Lab  10/08/18   0200   ALT  547*   AST  320*   ALKPHOS  133   BILITOT  16.4*   PROT  3.8*   ALBUMIN  2.4*

## 2018-10-08 NOTE — NURSING
Transplant resident called for increased abdominal distention compared to night of 10/6. Abdomen rounded and taught. No pain to palpation. No nausea reported. Bowel sounds audible and active in all quadrants. VALORIE drain x2 in place. Output reviewed with MD. He states to continue to monitor and report any concerning changes.

## 2018-10-08 NOTE — PROGRESS NOTES
[facility]  Cardiothoracic Surgery  Progress Note    Patient Name: Scarlett Reddy  MRN: [id number]  Admission Date: 10/1/2018  Hospital Length of Stay: 6 days  Code Status: Full Code   Attending Physician: Benson Matson MD   Referring Provider: Self, Aaareferral  Principal Problem:Liver transplanted            Subjective:     Post-Op Info:  Procedure(s) (LRB):  LAPAROTOMY, EXPLORATORY, AFTER LIVER TRANSPLANT (N/A)   2 Days Post-Op     Interval History: Urine from 10/4 Klebsiella +. Abdominal distension worsened. Some symptomatic orthostatic hypotension this AM, CVP 4, given albumin. No acute events overnight.     Review of Systems   Constitution: Positive for weakness. Negative for fever.   Gastrointestinal: Positive for bloating and jaundice.     Medications:  Continuous Infusions:   insulin (HUMAN R) infusion (adults) 1.4 Units/hr (10/08/18 1000)    lactated ringers 100 mL/hr at 10/08/18 1000    vasopressin (PITRESSIN) infusion Stopped (10/05/18 1900)     Scheduled Meds:   [START ON 10/10/2018] entecavir  0.5 mg Oral Q48H    ergocalciferol  50,000 Units Oral Q7 Days    famotidine  20 mg Oral QHS    fluconazole  200 mg Oral Daily    heparin (porcine)  5,000 Units Subcutaneous Q8H    hepatitis B immune globulin (HEPA-RALPH B) IVPB  9,999.912 Units Intravenous Daily    insulin aspart U-100  2-4 Units Subcutaneous TIDWM    methylPREDNISolone sodium succinate  60 mg Intravenous Q12H    Followed by    [START ON 10/9/2018] methylPREDNISolone sodium succinate  40 mg Intravenous Q12H    Followed by    [START ON 10/10/2018] methylPREDNISolone sodium succinate  20 mg Intravenous Q12H    Followed by    [START ON 10/11/2018] predniSONE  20 mg Oral Daily    midodrine  10 mg Oral TID    mycophenolate  1,000 mg Oral BID    piperacillin-tazobactam (ZOSYN) IVPB  4.5 g Intravenous Q8H    polyethylene glycol  17 g Oral Daily    [START ON 10/10/2018] sulfamethoxazole-trimethoprim 400-80mg  1  tablet Oral Every Mon, Wed, Fri    [START ON 10/15/2018] valganciclovir 50 mg/ml  450 mg Per NG tube Daily     PRN Meds:sodium chloride, bisacodyl, dextrose 50%, dextrose 50%, glucagon (human recombinant), glucose, glucose, insulin aspart U-100, ondansetron, oxyCODONE, oxyCODONE, promethazine (PHENERGAN) IVPB, sodium chloride 0.9%     Objective:     Vital Signs (Most Recent):  Temp: 97.5 °F (36.4 °C) (10/08/18 0700)  Pulse: 101 (10/08/18 1015)  Resp: (!) 21 (10/08/18 1015)  BP: 112/60 (10/08/18 0900)  SpO2: 97 % (10/08/18 1015) Vital Signs (24h Range):  Temp:  [97.5 °F (36.4 °C)-98 °F (36.7 °C)] 97.5 °F (36.4 °C)  Pulse:  [] 101  Resp:  [7-29] 21  SpO2:  [95 %-100 %] 97 %  BP: (112-152)/(60-86) 112/60     Weight: 64.2 kg (141 lb 8.6 oz)  Body mass index is 25.07 kg/m².    SpO2: 97 %  O2 Device (Oxygen Therapy): room air    Intake/Output - Last 3 Shifts       10/06 0700 - 10/07 0659 10/07 0700 - 10/08 0659 10/08 0700 - 10/09 0659    P.O.       I.V. (mL/kg) 4770 (74.3) 3346.4 (52.1)     Blood 810 250     NG/GT       IV Piggyback 700 231     Total Intake(mL/kg) 6280 (97.8) 3827.4 (59.6)     Urine (mL/kg/hr) 970 (0.6) 1155 (0.7) 90 (0.4)    Drains 969 1500 42    Stool  0 0    Blood 50      Total Output 1989 2655 132    Net +4291 +1172.4 -132           Stool Occurrence  1 x 2 x          Lines/Drains/Airways     Central Venous Catheter Line                 Percutaneous Central Line Insertion/Assessment - triple lumen  10/04/18 2311 right internal jugular 3 days          Drain                 Closed/Suction Drain 10/05/18 0426 Right Abdomen Bulb 19 Fr. 3 days         Closed/Suction Drain 10/05/18 0427 Right Abdomen Bulb 19 Fr. 3 days         Urethral Catheter 10/04/18 2310 Non-latex;Straight-tip 16 Fr. 3 days                Physical Exam   Constitutional: She is oriented to person, place, and time. She appears well-developed. No distress.   HENT:   Head: Normocephalic and atraumatic.   Eyes: EOM are normal. Pupils  are equal, round, and reactive to light. Scleral icterus is present.   Neck: Neck supple. No tracheal deviation present.   Cardiovascular: Regular rhythm and intact distal pulses.   Mild tachycardia   Pulmonary/Chest: Effort normal. No respiratory distress.   Abdominal: Soft. She exhibits distension. There is tenderness (appropriate).   2 RLQ VALORIE drains in place   Musculoskeletal: She exhibits no edema.   Neurological: She is alert and oriented to person, place, and time.   Skin: Skin is warm and dry.   Psychiatric: She has a normal mood and affect.       Significant Labs:  CBC:   Recent Labs   Lab  10/08/18   0200   WBC  6.40   RBC  3.50*   HGB  10.6*   HCT  31.6*   PLT  82*   MCV  90   MCH  30.3   MCHC  33.5     CMP:   Recent Labs   Lab  10/08/18   0200   GLU  288*   CALCIUM  7.7*   ALBUMIN  2.4*   PROT  3.8*   NA  131*   K  3.9   CO2  19*   CL  100   BUN  60*   CREATININE  1.4   ALKPHOS  133   ALT  547*   AST  320*   BILITOT  16.4*     Coagulation:   Recent Labs   Lab  10/08/18   0200   INR  1.3*   APTT  33.3*     All pertinent labs from the last 24 hours have been reviewed.    Significant Diagnostics:  I have reviewed all pertinent imaging results/findings within the past 24 hours.    Assessment/Plan:     * Liver transplanted    Neuro:   - Off sedation  - Pain control: Tylenol, dPCA  - Moving all extremities to command  - Will d/c PCA, d/c tylenol 2/2 continued rise in T bili, oxycodone prn for pain control     Pulmonary:   - Stable on room air  - Continue to monitor     Cardiac:  - Off pressors  - Some orthostatic hypotension this AM, CVP 4  - Midodrine 10 TID     Renal:   - UOP appropriate for weight, but trailing off with low CVP  - Albumin 500 this AM  - Trend BMP  - Bower out     Infectious Disease:   - Afebrile  - Klebsiella + UTI will add 3d Cipro  - Trend WBC  - Fluconazole, Zosyn  - MMF, Prograf  - Bactrim, Valganciclovir     Hematology/Oncology:  - H&H stable  - Trend CBC     Endocrine:  - SSI,  Endocrine on board  - Monitor BG     Fluids/Electrolytes/Nutrition/GI:   - Diabetic diet  - Trend CMP   - Continue Pepcid BID  - Zofran for nausea  - Continue LR @ 100 until tolerating better PO intake     Dispo:  Stable for step down                Eitan Marvin MD  Cardiothoracic Surgery  [facility]

## 2018-10-08 NOTE — ASSESSMENT & PLAN NOTE
Neuro:   - Off sedation  - Pain control: Tylenol, dPCA  - Moving all extremities to command  - Will d/c PCA, d/c tylenol 2/2 continued rise in T bili, oxycodone prn for pain control     Pulmonary:   - Stable on room air  - Continue to monitor     Cardiac:  - Off pressors  - Some orthostatic hypotension this AM, CVP 4  - Midodrine 10 TID     Renal:   - UOP appropriate for weight, but trailing off with low CVP  - Albumin 500 this AM  - Trend BMP  - Bower out     Infectious Disease:   - Afebrile  - Klebsiella + UTI will add 3d Cipro  - Trend WBC  - Fluconazole, Zosyn  - MMF, Prograf  - Bactrim, Valganciclovir     Hematology/Oncology:  - H&H stable  - Trend CBC     Endocrine:  - SSI, Endocrine on board  - Monitor BG     Fluids/Electrolytes/Nutrition/GI:   - Diabetic diet  - Trend CMP   - Continue Pepcid BID  - Zofran for nausea  - Continue LR @ 100 until tolerating better PO intake     Dispo:  Stable for step down

## 2018-10-08 NOTE — PROGRESS NOTES
[facility]  Critical Care - Surgery  Progress Note    Patient Name: Scarlett Reddy  MRN: [id number]  Admission Date: 10/1/2018  Hospital Length of Stay: 6 days  Code Status: Full Code  Attending Provider: Benson Matson MD  Primary Care Provider: Primary Doctor No   Principal Problem: Liver transplanted    Subjective:     Hospital/ICU Course:  10/4 - liver transplant  10/6 - takeback for hyperbilirubinemia, bilious VALORIE output; no biliary leak found; reintubated for respiratory failure, then extubated    Interval History/Significant Events: Patient had reduced UOP (380 overnight), with orthostasis, tachycardia to 100s, CVP 4. Her abdomen is more distended than previously. Urine cultures came back positive for Klebsiella sensitive to Zosyn, which the patient already is taking. T-bili worsening (12.7 -> 16.4.) Poor BG control (168-262.)Pain controlled on PCA, tolerating RA, off pressors.     Follow-up For: Procedure(s) (LRB):  LAPAROTOMY, EXPLORATORY, AFTER LIVER TRANSPLANT (N/A)    Post-Operative Day: 2 Days Post-Op    Objective:     Vital Signs (Most Recent):  Temp: 97.5 °F (36.4 °C) (10/08/18 0300)  Pulse: 97 (10/08/18 0600)  Resp: (!) 7 (10/08/18 0600)  BP: 127/86 (10/08/18 0600)  SpO2: 97 % (10/08/18 0600) Vital Signs (24h Range):  Temp:  [97.5 °F (36.4 °C)-98 °F (36.7 °C)] 97.5 °F (36.4 °C)  Pulse:  [] 97  Resp:  [7-26] 7  SpO2:  [96 %-99 %] 97 %  BP: (113-152)/(60-86) 127/86     Weight: 64.2 kg (141 lb 8.6 oz)  Body mass index is 25.07 kg/m².      Intake/Output Summary (Last 24 hours) at 10/8/2018 0844  Last data filed at 10/8/2018 0600  Gross per 24 hour   Intake 3827.38 ml   Output 2365 ml   Net 1462.38 ml       Physical Exam    Vents:  Vent Mode: Spont (10/06/18 2111)  Ventilator Initiated: Yes (10/06/18 1657)  Set Rate: 18 bmp (10/06/18 1902)  Vt Set: 400 mL (10/06/18 1902)  Pressure Support: 5 cmH20 (10/06/18 2111)  PEEP/CPAP: 5 cmH20 (10/06/18 2111)  Oxygen Concentration (%):  35 (10/07/18 0000)  Peak Airway Pressure: 11 cmH2O (10/06/18 2111)  Plateau Pressure: 0 cmH20 (10/06/18 2111)  Total Ve: 2.74 mL (10/06/18 2111)  F/VT Ratio<105 (RSBI): (!) 26.91 (10/06/18 2104)    Lines/Drains/Airways     Central Venous Catheter Line                 Percutaneous Central Line Insertion/Assessment - triple lumen  10/04/18 2311 right internal jugular 3 days          Drain                 Closed/Suction Drain 10/05/18 0426 Right Abdomen Bulb 19 Fr. 3 days         Closed/Suction Drain 10/05/18 0427 Right Abdomen Bulb 19 Fr. 3 days         Urethral Catheter 10/04/18 2310 Non-latex;Straight-tip 16 Fr. 3 days                Significant Labs:    CBC/Anemia Profile:  Recent Labs   Lab  10/07/18   0500  10/07/18   2130  10/08/18   0200   WBC  5.56  9.14  6.40   HGB  11.0*  12.1  10.6*   HCT  31.0*  34.4*  31.6*   PLT  44*  110*  82*   MCV  88  89  90   RDW  15.4*  15.9*  15.8*        Chemistries:  Recent Labs   Lab  10/06/18   1601  10/07/18   0500  10/08/18   0200   NA  131*  132*  131*   K  4.5  4.4  3.9   CL  100  102  100   CO2  19*  21*  19*   BUN  38*  44*  60*   CREATININE  1.1  1.3  1.4   CALCIUM  7.3*  7.7*  7.7*   ALBUMIN  3.0*  2.7*  2.4*   PROT  4.4*  4.0*  3.8*   BILITOT  12.3*  12.7*  16.4*   ALKPHOS  139*  127  133   ALT  1,895*  1,230*  547*   AST  2,211*  1,186*  320*   MG  2.1  2.2  2.0   PHOS  5.0*  5.2*  4.8*       Significant Imaging:  I have reviewed and interpreted all pertinent imaging results/findings within the past 24 hours.    Assessment/Plan:     * Liver transplanted     Mrs. 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    Neuro:   - Sedation: none  - Pain control with PCA     Pulmonary:    -extubated yesterday, reintubated for hypercapneic hypoxemic respiratory distress; now reextubated,  tolerating RA  -  CXR daily  - ABG prn  - encourage IS, Nebs prn        Cardiac:  - Drips: none  - HDS  - 500 cc albumin today  - Monitor hemodynamic status     Renal:   - BUN/CR: 60/1.4 from 38/1.1  - Monitor UOP/ 380mL UOP overnight, net -240 cc (24 hr)  - albumin 500 cc this AM  - Bower in place      Fluids/Electrolytes/Nutrition/GI:   - diabetic diet  - LR @ 100  - Trend CMP  - Replace Lytes PRN   - Liver enzymes improving (AST 1200 -> 320, ALT 1200 -> 550)  - t-bili 16.4 from 12.7  -- Liver U/S 10/5: Satisfactory vascular appearance of the liver transplant.  Increased periportal echogenicity.  This suggests hepatic edema.  Two small peritransplant fluid collections.  Mild peritransplant ascites.  - repeat US 10/6 - increased arterial resistive indices, suggestive of edema vs acute rejection vs congestion      Infectious Disease:   - WBC 6.4 from 5.6  - Trend WBC  - Afebrile overnight   - Antibiotics: Zosyn, Bactrim, Valganciclovir, diflucan, entecavir  - Steroid taper  - MMF, Prograf  - UCx 10/4 grew Zosyn-sensitive Klebsiella     Hematology/Oncology:  - H&H  stable at 10.6/32  - INR 1.3  - Trend CBC/Coags  - See HPI for blood products received      Endocrine:  - Insulin gtt, POCT q1  -- POCT: 160s-260s  - Endocrine on board     PPx:  - SQH  - famotidine     Dispo:  Continue ICU care; possible stepdown today vs tomorrow  Primary: Transplant Liver                  Critical care was time spent personally by me on the following activities: development of treatment plan with patient or surrogate and bedside caregivers, discussions with consultants, evaluation of patient's response to treatment, examination of patient, ordering and performing treatments and interventions, ordering and review of laboratory studies, ordering and review of radiographic studies, pulse oximetry, re-evaluation of patient's condition.  This critical care time did not overlap with that of any other provider or involve time for any  procedures.     River Rueda MD  Critical Care - Surgery  [facility]

## 2018-10-09 PROBLEM — K76.82 HEPATIC ENCEPHALOPATHY: Status: RESOLVED | Noted: 2018-08-14 | Resolved: 2018-10-09

## 2018-10-09 PROBLEM — Z91.89 AT RISK FOR OPPORTUNISTIC INFECTIONS: Status: ACTIVE | Noted: 2018-10-09

## 2018-10-09 PROBLEM — E87.20 METABOLIC ACIDOSIS: Status: ACTIVE | Noted: 2018-10-09

## 2018-10-09 PROBLEM — Z79.60 LONG-TERM USE OF IMMUNOSUPPRESSANT MEDICATION: Status: ACTIVE | Noted: 2018-10-09

## 2018-10-09 PROBLEM — E87.6 HYPOKALEMIA: Status: RESOLVED | Noted: 2018-09-20 | Resolved: 2018-10-09

## 2018-10-09 LAB
ALBUMIN SERPL BCP-MCNC: 2.5 G/DL
ALP SERPL-CCNC: 137 U/L
ALT SERPL W/O P-5'-P-CCNC: 242 U/L
AMORPH CRY UR QL COMP ASSIST: ABNORMAL
ANION GAP SERPL CALC-SCNC: 11 MMOL/L
APTT BLDCRRT: 42.5 SEC
AST SERPL-CCNC: 125 U/L
BACTERIA #/AREA URNS AUTO: ABNORMAL /HPF
BACTERIA SPEC AEROBE CULT: NO GROWTH
BASOPHILS # BLD AUTO: 0 K/UL
BASOPHILS NFR BLD: 0 %
BILIRUB SERPL-MCNC: 13.7 MG/DL
BILIRUB UR QL STRIP: NEGATIVE
BUN SERPL-MCNC: 68 MG/DL
CALCIUM SERPL-MCNC: 7.8 MG/DL
CHLORIDE SERPL-SCNC: 100 MMOL/L
CLARITY UR REFRACT.AUTO: ABNORMAL
CO2 SERPL-SCNC: 18 MMOL/L
COLOR UR AUTO: ABNORMAL
CREAT SERPL-MCNC: 1.5 MG/DL
DIFFERENTIAL METHOD: ABNORMAL
EOSINOPHIL # BLD AUTO: 0 K/UL
EOSINOPHIL NFR BLD: 0 %
ERYTHROCYTE [DISTWIDTH] IN BLOOD BY AUTOMATED COUNT: 15.9 %
EST. GFR  (AFRICAN AMERICAN): 40.7 ML/MIN/1.73 M^2
EST. GFR  (NON AFRICAN AMERICAN): 35.3 ML/MIN/1.73 M^2
FUNGUS SPEC CULT: NORMAL
GLUCOSE SERPL-MCNC: 154 MG/DL
GLUCOSE UR QL STRIP: NEGATIVE
HCT VFR BLD AUTO: 26.2 %
HGB BLD-MCNC: 9.2 G/DL
HGB UR QL STRIP: ABNORMAL
HYALINE CASTS UR QL AUTO: 0 /LPF
IMM GRANULOCYTES # BLD AUTO: 0.03 K/UL
IMM GRANULOCYTES NFR BLD AUTO: 0.6 %
INR PPP: 1.2
KETONES UR QL STRIP: NEGATIVE
LEUKOCYTE ESTERASE UR QL STRIP: NEGATIVE
LYMPHOCYTES # BLD AUTO: 0.1 K/UL
LYMPHOCYTES NFR BLD: 2.4 %
MAGNESIUM SERPL-MCNC: 1.9 MG/DL
MCH RBC QN AUTO: 30.8 PG
MCHC RBC AUTO-ENTMCNC: 35.1 G/DL
MCV RBC AUTO: 88 FL
MICROSCOPIC COMMENT: ABNORMAL
MONOCYTES # BLD AUTO: 0.2 K/UL
MONOCYTES NFR BLD: 4.1 %
NEUTROPHILS # BLD AUTO: 4.9 K/UL
NEUTROPHILS NFR BLD: 92.9 %
NITRITE UR QL STRIP: NEGATIVE
NRBC BLD-RTO: 0 /100 WBC
PH UR STRIP: 5 [PH] (ref 5–8)
PHOSPHATE SERPL-MCNC: 4.8 MG/DL
PLATELET # BLD AUTO: 71 K/UL
PMV BLD AUTO: 10.7 FL
POCT GLUCOSE: 149 MG/DL (ref 70–110)
POCT GLUCOSE: 202 MG/DL (ref 70–110)
POCT GLUCOSE: 209 MG/DL (ref 70–110)
POCT GLUCOSE: 236 MG/DL (ref 70–110)
POCT GLUCOSE: 243 MG/DL (ref 70–110)
POTASSIUM SERPL-SCNC: 4.2 MMOL/L
PROT SERPL-MCNC: 3.6 G/DL
PROT UR QL STRIP: ABNORMAL
PROTHROMBIN TIME: 12.7 SEC
RBC # BLD AUTO: 2.99 M/UL
RBC #/AREA URNS AUTO: 10 /HPF (ref 0–4)
SODIUM SERPL-SCNC: 129 MMOL/L
SP GR UR STRIP: 1.02 (ref 1–1.03)
SQUAMOUS #/AREA URNS AUTO: 5 /HPF
TACROLIMUS BLD-MCNC: 14.1 NG/ML
URN SPEC COLLECT METH UR: ABNORMAL
UROBILINOGEN UR STRIP-ACNC: NEGATIVE EU/DL
WBC # BLD AUTO: 5.31 K/UL
WBC #/AREA URNS AUTO: 2 /HPF (ref 0–5)

## 2018-10-09 PROCEDURE — 25000003 PHARM REV CODE 250: Performed by: STUDENT IN AN ORGANIZED HEALTH CARE EDUCATION/TRAINING PROGRAM

## 2018-10-09 PROCEDURE — 27000646 HC AEROBIKA DEVICE

## 2018-10-09 PROCEDURE — 85730 THROMBOPLASTIN TIME PARTIAL: CPT

## 2018-10-09 PROCEDURE — 63600175 PHARM REV CODE 636 W HCPCS: Performed by: NURSE PRACTITIONER

## 2018-10-09 PROCEDURE — 83735 ASSAY OF MAGNESIUM: CPT

## 2018-10-09 PROCEDURE — 99232 SBSQ HOSP IP/OBS MODERATE 35: CPT | Mod: ,,, | Performed by: INTERNAL MEDICINE

## 2018-10-09 PROCEDURE — 85610 PROTHROMBIN TIME: CPT

## 2018-10-09 PROCEDURE — 94761 N-INVAS EAR/PLS OXIMETRY MLT: CPT

## 2018-10-09 PROCEDURE — 81001 URINALYSIS AUTO W/SCOPE: CPT

## 2018-10-09 PROCEDURE — 84100 ASSAY OF PHOSPHORUS: CPT

## 2018-10-09 PROCEDURE — 25000003 PHARM REV CODE 250: Performed by: TRANSPLANT SURGERY

## 2018-10-09 PROCEDURE — 80053 COMPREHEN METABOLIC PANEL: CPT

## 2018-10-09 PROCEDURE — 87086 URINE CULTURE/COLONY COUNT: CPT

## 2018-10-09 PROCEDURE — 94664 DEMO&/EVAL PT USE INHALER: CPT

## 2018-10-09 PROCEDURE — 25000003 PHARM REV CODE 250: Performed by: NURSE PRACTITIONER

## 2018-10-09 PROCEDURE — 20600001 HC STEP DOWN PRIVATE ROOM

## 2018-10-09 PROCEDURE — 99233 SBSQ HOSP IP/OBS HIGH 50: CPT | Mod: 24,,, | Performed by: PHYSICIAN ASSISTANT

## 2018-10-09 PROCEDURE — 63600175 PHARM REV CODE 636 W HCPCS: Performed by: SURGERY

## 2018-10-09 PROCEDURE — 99900035 HC TECH TIME PER 15 MIN (STAT)

## 2018-10-09 PROCEDURE — 80197 ASSAY OF TACROLIMUS: CPT

## 2018-10-09 PROCEDURE — 63600175 PHARM REV CODE 636 W HCPCS: Mod: JG | Performed by: PHYSICIAN ASSISTANT

## 2018-10-09 PROCEDURE — 63600175 PHARM REV CODE 636 W HCPCS: Mod: JG | Performed by: STUDENT IN AN ORGANIZED HEALTH CARE EDUCATION/TRAINING PROGRAM

## 2018-10-09 PROCEDURE — P9047 ALBUMIN (HUMAN), 25%, 50ML: HCPCS | Mod: JG | Performed by: PHYSICIAN ASSISTANT

## 2018-10-09 PROCEDURE — 99255 IP/OBS CONSLTJ NEW/EST HI 80: CPT | Mod: ,,, | Performed by: INTERNAL MEDICINE

## 2018-10-09 PROCEDURE — 63600175 PHARM REV CODE 636 W HCPCS: Performed by: TRANSPLANT SURGERY

## 2018-10-09 PROCEDURE — 85025 COMPLETE CBC W/AUTO DIFF WBC: CPT

## 2018-10-09 PROCEDURE — 25000003 PHARM REV CODE 250: Performed by: PHYSICIAN ASSISTANT

## 2018-10-09 RX ORDER — INSULIN ASPART 100 [IU]/ML
2 INJECTION, SOLUTION INTRAVENOUS; SUBCUTANEOUS 3 TIMES DAILY PRN
Status: DISCONTINUED | OUTPATIENT
Start: 2018-10-09 | End: 2018-10-14

## 2018-10-09 RX ORDER — URSODIOL 300 MG/1
300 CAPSULE ORAL 2 TIMES DAILY
Status: DISCONTINUED | OUTPATIENT
Start: 2018-10-09 | End: 2018-11-29

## 2018-10-09 RX ORDER — INSULIN ASPART 100 [IU]/ML
3-5 INJECTION, SOLUTION INTRAVENOUS; SUBCUTANEOUS
Status: DISCONTINUED | OUTPATIENT
Start: 2018-10-09 | End: 2018-10-11

## 2018-10-09 RX ORDER — SODIUM CHLORIDE, SODIUM LACTATE, POTASSIUM CHLORIDE, CALCIUM CHLORIDE 600; 310; 30; 20 MG/100ML; MG/100ML; MG/100ML; MG/100ML
INJECTION, SOLUTION INTRAVENOUS CONTINUOUS
Status: DISCONTINUED | OUTPATIENT
Start: 2018-10-09 | End: 2018-10-10

## 2018-10-09 RX ORDER — SODIUM BICARBONATE 650 MG/1
1300 TABLET ORAL 3 TIMES DAILY
Status: DISCONTINUED | OUTPATIENT
Start: 2018-10-09 | End: 2018-10-10

## 2018-10-09 RX ORDER — LIDOCAINE HYDROCHLORIDE 10 MG/ML
10 INJECTION INFILTRATION; PERINEURAL ONCE
Status: COMPLETED | OUTPATIENT
Start: 2018-10-09 | End: 2018-10-09

## 2018-10-09 RX ORDER — ALBUMIN HUMAN 250 G/1000ML
25 SOLUTION INTRAVENOUS 2 TIMES DAILY
Status: COMPLETED | OUTPATIENT
Start: 2018-10-09 | End: 2018-10-09

## 2018-10-09 RX ORDER — FUROSEMIDE 10 MG/ML
20 INJECTION INTRAMUSCULAR; INTRAVENOUS ONCE
Status: COMPLETED | OUTPATIENT
Start: 2018-10-09 | End: 2018-10-09

## 2018-10-09 RX ADMIN — MYCOPHENOLATE MOFETIL 1000 MG: 250 CAPSULE ORAL at 09:10

## 2018-10-09 RX ADMIN — HEPARIN SODIUM 5000 UNITS: 5000 INJECTION, SOLUTION INTRAVENOUS; SUBCUTANEOUS at 02:10

## 2018-10-09 RX ADMIN — INSULIN ASPART 2 UNITS: 100 INJECTION, SOLUTION INTRAVENOUS; SUBCUTANEOUS at 08:10

## 2018-10-09 RX ADMIN — SODIUM BICARBONATE 650 MG TABLET 1300 MG: at 02:10

## 2018-10-09 RX ADMIN — LIDOCAINE HYDROCHLORIDE 10 ML: 10 INJECTION, SOLUTION INFILTRATION; PERINEURAL at 04:10

## 2018-10-09 RX ADMIN — MIDODRINE HYDROCHLORIDE 10 MG: 5 TABLET ORAL at 09:10

## 2018-10-09 RX ADMIN — SODIUM CHLORIDE 2 UNITS/HR: 9 INJECTION, SOLUTION INTRAVENOUS at 09:10

## 2018-10-09 RX ADMIN — ALBUMIN HUMAN 25 G: 0.25 SOLUTION INTRAVENOUS at 06:10

## 2018-10-09 RX ADMIN — METHYLPREDNISOLONE SODIUM SUCCINATE 40 MG: 40 INJECTION, POWDER, FOR SOLUTION INTRAMUSCULAR; INTRAVENOUS at 09:10

## 2018-10-09 RX ADMIN — INSULIN ASPART 1 UNITS: 100 INJECTION, SOLUTION INTRAVENOUS; SUBCUTANEOUS at 02:10

## 2018-10-09 RX ADMIN — INSULIN ASPART 2 UNITS: 100 INJECTION, SOLUTION INTRAVENOUS; SUBCUTANEOUS at 02:10

## 2018-10-09 RX ADMIN — HEPATITIS B IMMUNE GLOBULIN (HUMAN) 9999.91 UNITS: 312 INJECTION, SOLUTION INTRAMUSCULAR; INTRAVENOUS at 09:10

## 2018-10-09 RX ADMIN — ALBUMIN HUMAN 25 G: 0.25 SOLUTION INTRAVENOUS at 11:10

## 2018-10-09 RX ADMIN — FAMOTIDINE 20 MG: 20 TABLET ORAL at 09:10

## 2018-10-09 RX ADMIN — FUROSEMIDE 20 MG: 10 INJECTION, SOLUTION INTRAMUSCULAR; INTRAVENOUS at 11:10

## 2018-10-09 RX ADMIN — PIPERACILLIN AND TAZOBACTAM 4.5 G: 4; .5 INJECTION, POWDER, LYOPHILIZED, FOR SOLUTION INTRAVENOUS; PARENTERAL at 02:10

## 2018-10-09 RX ADMIN — CIPROFLOXACIN HYDROCHLORIDE 250 MG: 250 TABLET, FILM COATED ORAL at 09:10

## 2018-10-09 RX ADMIN — ERGOCALCIFEROL 50000 UNITS: 1.25 CAPSULE ORAL at 09:10

## 2018-10-09 RX ADMIN — HEPARIN SODIUM 5000 UNITS: 5000 INJECTION, SOLUTION INTRAVENOUS; SUBCUTANEOUS at 06:10

## 2018-10-09 RX ADMIN — SODIUM CHLORIDE, SODIUM LACTATE, POTASSIUM CHLORIDE, AND CALCIUM CHLORIDE: 600; 310; 30; 20 INJECTION, SOLUTION INTRAVENOUS at 10:10

## 2018-10-09 RX ADMIN — MIDODRINE HYDROCHLORIDE 10 MG: 5 TABLET ORAL at 02:10

## 2018-10-09 RX ADMIN — INSULIN ASPART 5 UNITS: 100 INJECTION, SOLUTION INTRAVENOUS; SUBCUTANEOUS at 09:10

## 2018-10-09 RX ADMIN — URSODIOL 300 MG: 300 CAPSULE ORAL at 11:10

## 2018-10-09 RX ADMIN — MIRTAZAPINE 7.5 MG: 7.5 TABLET ORAL at 09:10

## 2018-10-09 RX ADMIN — OXYCODONE HYDROCHLORIDE 10 MG: 10 TABLET ORAL at 10:10

## 2018-10-09 RX ADMIN — OXYCODONE HYDROCHLORIDE 10 MG: 10 TABLET ORAL at 09:10

## 2018-10-09 RX ADMIN — SODIUM BICARBONATE 650 MG TABLET 1300 MG: at 09:10

## 2018-10-09 RX ADMIN — FLUCONAZOLE 200 MG: 200 TABLET ORAL at 09:10

## 2018-10-09 RX ADMIN — HEPARIN SODIUM 5000 UNITS: 5000 INJECTION, SOLUTION INTRAVENOUS; SUBCUTANEOUS at 09:10

## 2018-10-09 RX ADMIN — PIPERACILLIN AND TAZOBACTAM 4.5 G: 4; .5 INJECTION, POWDER, LYOPHILIZED, FOR SOLUTION INTRAVENOUS; PARENTERAL at 09:10

## 2018-10-09 RX ADMIN — URSODIOL 300 MG: 300 CAPSULE ORAL at 09:10

## 2018-10-09 RX ADMIN — POLYETHYLENE GLYCOL 3350 17 G: 17 POWDER, FOR SOLUTION ORAL at 09:10

## 2018-10-09 NOTE — PROGRESS NOTES
"[facility]  Endocrinology  Progress Note    Admit Date: 10/1/2018     Reason for Consult: Management of type 2 DM, Hyperglycemia      Surgical Procedure and Date: Liver transplant 10/5/18, Ex lap 10/6/18     Diabetes diagnosis year:      Home Diabetes Medications:  Lantus 18 units HS and novolog 17 units with meals plus correction scale (150-200 +1)        Lab Results   Component Value Date     HGBA1C 6.8 (H) 2018        How often checking glucose at home? 3-4   BG readings on regimen: 120-150.  Post prandial readings as high as upper 200s  Hypoglycemia on the regimen? yes, none recently   Missed doses on regimen?  No     Diabetes Complications include:     Diabetic peripheral neuropathy      Complicating diabetes co morbidities:   CIRRHOSIS        HPI:  Patient is a 68 y.o. female with a diagnosis of ESLD, listed for liver transplant with meld 23 who underwent live transplant on 10/5/18.  Back to OR on 10/6/18 for ex lap.  Admitted 10/1/18 for hyponatremia s/p paracentesis. She speaks primarily Lao. Information obtained from recent past admission and discussion over the phone via  ([employer]). Family not at bedside. Pt request family at bedside for future conversations. Personal has known diagnosis of diabetes, endocrine consulted for diabetes management.        Interval HPI:  Overnight events: remains in TSU. BG elevated overnight but trending down to goal this AM with insulin infusion at 2 u/hr; prandial coverage and correction scale. Solumedrol 40 mg BID; standard steroid taper.   Eatin%  Nausea: No  Hypoglycemia and intervention: No  Fever: No  TPN and/or TF: No    /78   Pulse 106   Temp 97.5 °F (36.4 °C) (Oral)   Resp 16   Ht 5' 3" (1.6 m)   Wt 67.7 kg (149 lb 3.2 oz)   LMP  (LMP Unknown)   SpO2 100%   Breastfeeding? No   BMI 26.43 kg/m²      Labs Reviewed and Include    Recent Labs   Lab  10/09/18   0600   GLU  154*   CALCIUM  7.8*   ALBUMIN  2.5* "   PROT  3.6*   NA  129*   K  4.2   CO2  18*   CL  100   BUN  68*   CREATININE  1.5*   ALKPHOS  137*   ALT  242*   AST  125*   BILITOT  13.7*     Lab Results   Component Value Date    WBC 5.31 10/09/2018    HGB 9.2 (L) 10/09/2018    HCT 26.2 (L) 10/09/2018    MCV 88 10/09/2018    PLT 71 (L) 10/09/2018     No results for input(s): TSH, FREET4 in the last 168 hours.  Lab Results   Component Value Date    HGBA1C 6.0 (H) 10/04/2018       Nutritional status:   Body mass index is 26.43 kg/m².  Lab Results   Component Value Date    ALBUMIN 2.5 (L) 10/09/2018    ALBUMIN 2.4 (L) 10/08/2018    ALBUMIN 2.7 (L) 10/07/2018     Lab Results   Component Value Date    PREALBUMIN 12 (L) 09/17/2018       Estimated Creatinine Clearance: 32.7 mL/min (A) (based on SCr of 1.5 mg/dL (H)).    Accu-Checks  Recent Labs      10/07/18   1751  10/07/18   2111  10/08/18   0208  10/08/18   0723  10/08/18   1144  10/08/18   1535  10/08/18   1809  10/08/18   2129  10/09/18   0204  10/09/18   0813   POCTGLUCOSE  262*  252*  291*  252*  314*  358*  260*  236*  209*  149*       Current Medications and/or Treatments Impacting Glycemic Control  Immunotherapy:    Immunosuppressants         Stop Route Frequency     mycophenolate capsule 1,000 mg      -- Oral 2 times daily        Steroids:   Hormones (From admission, onward)    Start     Stop Route Frequency Ordered    10/11/18 0900  predniSONE tablet 20 mg  (methylprednisolone taper panel)      -- Oral Daily 10/05/18 0709    10/10/18 0900  methylPREDNISolone sodium succinate injection 20 mg  (methylprednisolone taper panel)      10/11 0859 IV Every 12 hours 10/05/18 0709    10/09/18 0900  methylPREDNISolone sodium succinate injection 40 mg  (methylprednisolone taper panel)      10/10 0859 IV Every 12 hours 10/05/18 0709        Pressors:    Autonomic Drugs (From admission, onward)    Start     Stop Route Frequency Ordered    10/08/18 1500  midodrine tablet 10 mg      -- Oral 3 times daily 10/08/18 1009     10/06/18 1617  rocuronium (ZEMURON) 10 mg/mL injection     Comments:  Created by cabinet override    10/07 0429   10/06/18 1617        Hyperglycemia/Diabetes Medications:   Antihyperglycemics (From admission, onward)    Start     Stop Route Frequency Ordered    10/07/18 1200  insulin regular (Humulin R) 100 Units in sodium chloride 0.9% 100 mL infusion      -- IV Continuous 10/07/18 1055    10/07/18 1200  insulin aspart U-100 pen 2-4 Units      -- SubQ 3 times daily with meals 10/07/18 1055    10/07/18 1155  insulin aspart U-100 pen 0-4 Units      -- SubQ As needed (PRN) 10/07/18 1055          ASSESSMENT and PLAN    * Liver transplanted    Managed per primary team  Avoid hypoglycemia    Recent Labs   Lab  10/09/18   0600   ALT  242*   AST  125*   ALKPHOS  137*   BILITOT  13.7*   PROT  3.6*   ALBUMIN  2.5*               Prophylactic immunotherapy    May increase insulin resistance.           Adverse effect of corticosteroids    On standard steroid taper per transplant team; may elevate BG readings            Hyponatremia    Na 119 on admit 10/1; 129 on 10/9  Managed per primary team          Type 2 diabetes mellitus with diabetic polyneuropathy, with long-term current use of insulin    BG goal 140 - 180     Continue transition IV insulin drip to 2 u/hr  Increase Novolog 3-5 units with meals   2 units PRN protein shake  Low dose correction scale insulin  BG monitoring AC/HS/0200      Monitor for prandial excursions    Discharge planning:  TBD        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                  Marcelino Wiley MD  Endocrinology  [facility]

## 2018-10-09 NOTE — ASSESSMENT & PLAN NOTE
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   -POD 1 US: increased arterial resistive indices, suggestive of edema vs cute rejection vs congestion  -Repeat US today pending   -LFTs trending down  -T bili elevated, but trending down. Started ursodiol

## 2018-10-09 NOTE — SUBJECTIVE & OBJECTIVE
Scheduled Meds:   albumin human 25%  25 g Intravenous BID    [START ON 10/10/2018] entecavir  0.5 mg Oral Q48H    ergocalciferol  50,000 Units Oral Q7 Days    famotidine  20 mg Oral QHS    fluconazole  200 mg Oral Daily    heparin (porcine)  5,000 Units Subcutaneous Q8H    hepatitis B immune globulin (HEPA-RALPH B) IVPB  9,999.912 Units Intravenous Daily    insulin aspart U-100  3-5 Units Subcutaneous TIDWM    methylPREDNISolone sodium succinate  40 mg Intravenous Q12H    Followed by    [START ON 10/10/2018] methylPREDNISolone sodium succinate  20 mg Intravenous Q12H    Followed by    [START ON 10/11/2018] predniSONE  20 mg Oral Daily    midodrine  10 mg Oral TID    mirtazapine  7.5 mg Oral QHS    mycophenolate  1,000 mg Oral BID    polyethylene glycol  17 g Oral Daily    sodium bicarbonate  1,300 mg Oral TID    [START ON 10/10/2018] sulfamethoxazole-trimethoprim 400-80mg  1 tablet Oral Every Mon, Wed, Fri    ursodiol  300 mg Oral BID    [START ON 10/15/2018] valganciclovir 50 mg/ml  450 mg Per NG tube Daily     Continuous Infusions:   insulin (HUMAN R) infusion (adults) 2 Units/hr (10/08/18 2130)    lactated ringers 50 mL/hr at 10/09/18 1059     PRN Meds:bisacodyl, dextrose 50%, dextrose 50%, glucagon (human recombinant), glucose, glucose, insulin aspart U-100, insulin aspart U-100, ondansetron, oxyCODONE, oxyCODONE, promethazine (PHENERGAN) IVPB, sodium chloride 0.9%    Review of Systems   Constitutional: Negative for chills and fever.   Respiratory: Negative for cough and shortness of breath.    Cardiovascular: Positive for leg swelling. Negative for chest pain.   Gastrointestinal: Positive for abdominal distention. Negative for abdominal pain, constipation, diarrhea, nausea and vomiting.   Genitourinary: Negative for decreased urine volume, difficulty urinating and dysuria.   Musculoskeletal: Negative for arthralgias and back pain.   Allergic/Immunologic: Positive for immunocompromised state.      Objective:     Vital Signs (Most Recent):  Temp: 98 °F (36.7 °C) (10/09/18 1200)  Pulse: (!) 113 (10/09/18 1507)  Resp: 16 (10/09/18 1200)  BP: 131/83 (10/09/18 1200)  SpO2: 96 % (10/09/18 1200) Vital Signs (24h Range):  Temp:  [97.5 °F (36.4 °C)-98 °F (36.7 °C)] 98 °F (36.7 °C)  Pulse:  [] 113  Resp:  [14-17] 16  SpO2:  [96 %-100 %] 96 %  BP: (118-131)/(78-85) 131/83     Weight: 67.7 kg (149 lb 3.2 oz)  Body mass index is 26.43 kg/m².    Intake/Output - Last 3 Shifts       10/07 0700 - 10/08 0659 10/08 0700 - 10/09 0659 10/09 0700 - 10/10 0659    P.O.   740    I.V. (mL/kg) 3346.4 (52.1) 2944.4 (43.5) 366.8 (5.4)    Blood 250      IV Piggyback     Total Intake(mL/kg) 3827.4 (59.6) 3244.4 (47.9) 2206.8 (32.6)    Urine (mL/kg/hr) 1155 (0.7) 305 (0.2) 300 (0.5)    Emesis/NG output  0     Drains 1500 554 140    Stool 0 0 0    Blood       Total Output 2655 859 440    Net +1172.4 +2385.4 +1766.8           Urine Occurrence  2 x     Stool Occurrence 1 x 2 x 0 x    Emesis Occurrence  0 x           Physical Exam   Constitutional: She is oriented to person, place, and time. She appears well-developed.   Temporal and distal extremity muscle wasting   Eyes: EOM are normal. Pupils are equal, round, and reactive to light. Scleral icterus is present.   Neck: Normal range of motion.   Cardiovascular: Normal rate, regular rhythm and normal heart sounds.   Pulmonary/Chest: Effort normal and breath sounds normal.   Abdominal: Soft. Bowel sounds are normal. She exhibits distension.   Chevron incision w/ staples- CDI  VALORIE x 2 - SS drainage   Musculoskeletal: Normal range of motion. She exhibits edema (B/l lower extremities ).   Neurological: She is alert and oriented to person, place, and time.   Skin: Skin is warm and dry.   Nursing note and vitals reviewed.      Laboratory:  Immunosuppressants         Stop Route Frequency     mycophenolate capsule 1,000 mg      -- Oral 2 times daily        CBC:   Recent Labs   Lab   10/09/18   0600   WBC  5.31   RBC  2.99*   HGB  9.2*   HCT  26.2*   PLT  71*   MCV  88   MCH  30.8   MCHC  35.1     CMP:   Recent Labs   Lab  10/09/18   0600   GLU  154*   CALCIUM  7.8*   ALBUMIN  2.5*   PROT  3.6*   NA  129*   K  4.2   CO2  18*   CL  100   BUN  68*   CREATININE  1.5*   ALKPHOS  137*   ALT  242*   AST  125*   BILITOT  13.7*     Labs within the past 24 hours have been reviewed.    Diagnostic Results:  I have personally reviewed all pertinent imaging studies.

## 2018-10-09 NOTE — PT/OT/SLP PROGRESS
Physical Therapy Treatment    Patient Name:  Scarlett Reddy   MRN:  [id number]    Co-treat with OT    Recommendations:     Discharge Recommendations:  home with home health   Discharge Equipment Recommendations: none   Barriers to discharge: None    Assessment:     Scarlett Reddy is a 68 y.o. female admitted with a medical diagnosis of Liver transplanted.  She presents with the following impairments/functional limitations:  impaired endurance, impaired self care skills, pain, gait instability. Pt progressing towards goals, but not at PLOF. Pt tolerated session well but required max encouragement throughout therapy session to participate. Pt is improving with therapy as evidence with increased gait distance Recommend d/c to HH to maximize functional independence.      Rehab Prognosis:  good; patient would benefit from acute skilled PT services to address these deficits and reach maximum level of function.      Recent Surgery: Procedure(s) (LRB):  LAPAROTOMY, EXPLORATORY, AFTER LIVER TRANSPLANT (N/A) 2 Days Post-Op    Plan:     During this hospitalization, patient to be seen 5 x/week to address the above listed problems via gait training, therapeutic activities, therapeutic exercises, neuromuscular re-education  · Plan of Care Expires:  11/05/18   Plan of Care Reviewed with: patient, family    Subjective     Communicated with RN prior to session and , daughter, and  present in room.  Patient found on BSC upon PT entry to room, agreeable to treatment.      Chief Complaint: wanting to return to supine and not participate in therapy   Patient comments/goals: to get better and return home   Pain/Comfort:  · Pain Rating 1: 0/10  · Pain Rating Post-Intervention 1: 0/10    Patients cultural, spiritual, Faith conflicts given the current situation: none reported     Objective:     Patient found with: telemetry, pulse ox (continuous), blood pressure cuff, central line, VALORIE drain, gonsales catheter     General  Precautions: Standard, fall   Orthopedic Precautions:N/A   Braces: N/A     Functional Mobility:  · Bed Mobility:     · Sit to Supine: minimum assistance  · Transfers:     · Sit to Stand:  contact guard assistance with no AD from BSC  · BSC to Bed: contact guard assistance with no AD using step transfer   · Gait: ~6ft forward/backward x 3 trials with CGA  · Max encouragement to participate       AM-PAC 6 CLICK MOBILITY  Turning over in bed (including adjusting bedclothes, sheets and blankets)?: 2  Sitting down on and standing up from a chair with arms (e.g., wheelchair, bedside commode, etc.): 3  Moving from lying on back to sitting on the side of the bed?: 2  Moving to and from a bed to a chair (including a wheelchair)?: 3  Need to walk in hospital room?: 3  Climbing 3-5 steps with a railing?: 1  Basic Mobility Total Score: 14       Therapeutic Activities and Exercises:  Educated pt on PT POC     present to assist with communication barrier (pt's primary language is Romanian)    Sitting EOB x 10 minutes with SBA to increase tolerance to OOB activity and to strengthen postural muscles  · B LE AROM x 10 reps: ankle pumps, LAQ's, marching to increase activity tolerance and B LE strength   · Combing hair with OT     Patient left HOB elevated with all lines intact, call button in reach, RN notified and , daughter, and  present..    GOALS:   Multidisciplinary Problems     Physical Therapy Goals        Problem: Physical Therapy Goal    Goal Priority Disciplines Outcome Goal Variances Interventions   Physical Therapy Goal     PT, PT/OT Ongoing (interventions implemented as appropriate)     Description:  Goals to be met by: 10/22/2018    Patient will increase functional independence with mobility by performin. Supine to sit with Contact Guard Assistance - not met  2. Sit to stand transfer with Supervision using LRAD or no AD - not met  3. Bed to chair transfer with Supervision using LRAD or no  AD - not met  4. Gait  x 250 feet with Supervision using LRAD or no AD - not met  5. Stand for 3 minutes with Supervision to increase activity tolerance - not met   6. Lower extremity exercise program x15 reps per handout, with independence - not met                       Time Tracking:     PT Received On: 10/08/18  PT Start Time: 0955     PT Stop Time: 1018  PT Total Time (min): 23 min     Billable Minutes: Therapeutic Activity 23    Treatment Type: Treatment  PT/PTA: PT           Mattie Prescott PT, DPT  10/8/2018  [phone]

## 2018-10-09 NOTE — PLAN OF CARE
AAO x 4. VSS, afebrile, SpO2>95% on RA. Telemetry monitoring SR 88-96.  BG monitoring AC/HS/2. Transitional insulin gtt @ 2 units/hr. DD=555-737.  LR @ 100 mL/hr.  Chevron incision JULIANE with staples. R VALORIE x 2 with serosanguinous drainage and clots present to bulb.  IV vanc and Zosyn continued.   Self meds set up.  Fall precautions maintained and pt repositions self. See flowsheet for assessment findings. Will continue to monitor.

## 2018-10-09 NOTE — SUBJECTIVE & OBJECTIVE
Past Medical History:   Diagnosis Date    Anemia requiring transfusions 8/16/2018    Ascites     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 8/15/2018    Cirrhosis     Cough 8/30/2018    Esophageal varices     Esophageal varices without bleeding 8/15/2018    Hepatic encephalopathy     Hepatitis B     Hepatitis D virus infection     Hypersplenism     Hyponatremia     Hypotension     Liver transplant candidate     Neutropenia     Portal hypertension     Severe malnutrition 8/16/2018    Type 2 diabetes mellitus, with long-term current use of insulin 8/15/2018       Past Surgical History:   Procedure Laterality Date    EGD (ESOPHAGOGASTRODUODENOSCOPY) N/A 8/17/2018    Performed by Travon Delgadillo MD at [facility] ([address] [place] [address])    ESOPHAGOGASTRODUODENOSCOPY N/A 8/17/2018    Procedure: EGD (ESOPHAGOGASTRODUODENOSCOPY);  Surgeon: Travon Delgadillo MD;  Location: [facility] ([address] [place] [address]);  Service: Endoscopy;  Laterality: N/A;    EXPLORATORY LAPAROTOMY AFTER LIVER TRANSPLANTATION N/A 10/6/2018    Procedure: LAPAROTOMY, EXPLORATORY, AFTER LIVER TRANSPLANT;  Surgeon: Benson Matson MD;  Location: [facility] OR [address] [place] [address];  Service: Transplant;  Laterality: N/A;    LAPAROTOMY, EXPLORATORY, AFTER LIVER TRANSPLANT N/A 10/6/2018    Performed by Benson Matson MD at [facility] OR [address] [place] [address]    LIVER TRANSPLANT N/A 10/4/2018    Procedure: TRANSPLANT, LIVER;  Surgeon: Yony Burns MD;  Location: [facility] OR [address] [place] [address];  Service: Transplant;  Laterality: N/A;    TRANSPLANT, LIVER N/A 10/4/2018    Performed by Yony Burns MD at [facility] OR [address] [place] [address]       Review of patient's allergies indicates:  No Known Allergies    Medications:  Medications Prior to Admission   Medication Sig    insulin aspart U-100 (NOVOLOG FLEXPEN U-100 INSULIN) 100 unit/mL InPn pen Inject 20 units into the skin with breakfast,17 units with lunch, and 17 with dinner Plus correction scale, max TDD 60 units daily    insulin glargine (LANTUS) 100 unit/mL  "injection Inject 20 Units into the skin every evening.    midodrine (PROAMATINE) 5 MG Tab Take 3 tablets (15 mg total) by mouth every 8 (eight) hours.    OMEGA-3 FATTY ACIDS-EPA ORAL Take by mouth.    rifAXIMin (XIFAXAN) 550 mg Tab Take 1 tablet (550 mg total) by mouth 2 (two) times daily.    sodium bicarbonate 650 MG tablet Take 2 tablets (1,300 mg total) by mouth 3 (three) times daily.    [DISCONTINUED] ergocalciferol, vitamin D2, (VITAMIN D ORAL) Take by mouth.    blood sugar diagnostic (ACCU-CHEK DC) Strp 1 strip by Misc.(Non-Drug; Combo Route) route 3 (three) times daily.    pen needle, diabetic (BD ULTRA-FINE CITLALI PEN NEEDLE) 32 gauge x 5/32" Ndle To use to inject insulin 4x daily     Antibiotics (From admission, onward)    Start     Stop Route Frequency Ordered    10/10/18 0900  sulfamethoxazole-trimethoprim 400-80mg per tablet 1 tablet      -- Oral Every Mon, Wed, Fri 10/08/18 1008        Antifungals (From admission, onward)    Start     Stop Route Frequency Ordered    10/06/18 0915  fluconazole tablet 200 mg      -- Oral Daily 10/06/18 0807        Antivirals (From admission, onward)        Stop Route Frequency     valganciclovir 50 mg/ml      -- PER NG TUBE Daily     entecavir      -- Oral Every 48 hours             There is no immunization history on file for this patient.    Family History     None        Social History     Socioeconomic History    Marital status:      Spouse name: None    Number of children: None    Years of education: None    Highest education level: None   Social Needs    Financial resource strain: None    Food insecurity - worry: None    Food insecurity - inability: None    Transportation needs - medical: None    Transportation needs - non-medical: None   Occupational History    None   Tobacco Use    Smoking status: Never Smoker    Smokeless tobacco: Never Used   Substance and Sexual Activity    Alcohol use: No     Frequency: Never    Drug use: No    " Sexual activity: None   Other Topics Concern    None   Social History Narrative    None     Review of Systems   Constitutional: Negative for chills and fever.   HENT: Negative for mouth sores, rhinorrhea and sore throat.    Respiratory: Negative for cough and shortness of breath.    Gastrointestinal: Negative for abdominal distention, abdominal pain, diarrhea, nausea and vomiting.   Skin: Negative for rash.     Objective:     Vital Signs (Most Recent):  Temp: 98 °F (36.7 °C) (10/09/18 1600)  Pulse: 100 (10/09/18 1600)  Resp: 12 (10/09/18 1600)  BP: 136/86 (10/09/18 1600)  SpO2: 100 % (10/09/18 1600) Vital Signs (24h Range):  Temp:  [97.5 °F (36.4 °C)-98 °F (36.7 °C)] 98 °F (36.7 °C)  Pulse:  [] 100  Resp:  [12-17] 12  SpO2:  [96 %-100 %] 100 %  BP: (118-136)/(78-86) 136/86     Weight: 67.7 kg (149 lb 3.2 oz)  Body mass index is 26.43 kg/m².    Estimated Creatinine Clearance: 32.7 mL/min (A) (based on SCr of 1.5 mg/dL (H)).    Physical Exam   Constitutional: She is oriented to person, place, and time. She appears well-developed and well-nourished.   HENT:   Head: Normocephalic and atraumatic.   Eyes: EOM are normal. Pupils are equal, round, and reactive to light.   Neck: Normal range of motion.   Cardiovascular: Normal rate, regular rhythm and normal heart sounds.   Pulmonary/Chest: Effort normal and breath sounds normal.   Abdominal: Soft. Bowel sounds are normal. She exhibits no distension. There is no tenderness.   Incision clean no drainage visible.   Musculoskeletal: Normal range of motion. She exhibits no edema.   Neurological: She is alert and oriented to person, place, and time.   Skin: No rash noted.       Significant Labs:   Blood Culture:   Recent Labs   Lab  08/17/18   2236  08/30/18   1545  08/30/18   1550  09/17/18   1050  09/17/18   1200   LABBLOO  No growth after 5 days.  No growth after 5 days.  No growth after 5 days.  No growth after 5 days.  No growth after 5 days.  No growth after 5  days.     BMP:   Recent Labs   Lab  10/09/18   0600   GLU  154*   NA  129*   K  4.2   CL  100   CO2  18*   BUN  68*   CREATININE  1.5*   CALCIUM  7.8*   MG  1.9     CBC:   Recent Labs   Lab  10/07/18   2130  10/08/18   0200  10/09/18   0600   WBC  9.14  6.40  5.31   HGB  12.1  10.6*  9.2*   HCT  34.4*  31.6*  26.2*   PLT  110*  82*  71*     CMP:   Recent Labs   Lab  10/08/18   0200  10/09/18   0600   NA  131*  129*   K  3.9  4.2   CL  100  100   CO2  19*  18*   GLU  288*  154*   BUN  60*  68*   CREATININE  1.4  1.5*   CALCIUM  7.7*  7.8*   PROT  3.8*  3.6*   ALBUMIN  2.4*  2.5*   BILITOT  16.4*  13.7*   ALKPHOS  133  137*   AST  320*  125*   ALT  547*  242*   ANIONGAP  12  11   EGFRNONAA  38.4*  35.3*     Microbiology Results (last 7 days)     Procedure Component Value Units Date/Time    Urine culture [[id number]] Collected:  10/09/18 1130    Order Status:  Sent Specimen:  Urine, Clean Catch Updated:  10/09/18 1131    Culture, Anaerobe [[id number]] Collected:  10/06/18 1409    Order Status:  Completed Specimen:  Body Fluid from Peritoneal Fluid Updated:  10/09/18 1054     Anaerobic Culture Culture in progress    Aerobic culture [[id number]] Collected:  10/06/18 1409    Order Status:  Completed Specimen:  Body Fluid from Peritoneal Fluid Updated:  10/09/18 0914     Aerobic Bacterial Culture No growth    Culture, Anaerobe [[id number]] Collected:  10/05/18 0116    Order Status:  Completed Specimen:  Body Fluid from Ascites Updated:  10/09/18 0816     Anaerobic Culture Culture in progress    Aerobic culture [[id number]] Collected:  10/05/18 0116    Order Status:  Completed Specimen:  Body Fluid from Ascites Updated:  10/08/18 1349     Aerobic Bacterial Culture --     DIPHTHEROIDS  Rare      Urine culture [[id number]]  (Susceptibility) Collected:  10/04/18 2158    Order Status:  Completed Specimen:  Urine, Clean Catch Updated:  10/06/18 2221     Urine Culture, Routine --     KLEBSIELLA PNEUMONIAE ESBL  >100,000 cfu/ml  No  other significant isolate      Narrative:       On admission    Gram stain [[id number]] Collected:  10/06/18 1409    Order Status:  Completed Specimen:  Body Fluid from Peritoneal Fluid Updated:  10/06/18 1450     Gram Stain Result No WBC's      No organisms seen    Fungus culture [[id number]] Collected:  10/06/18 1409    Order Status:  Sent Specimen:  Body Fluid from Peritoneal Fluid Updated:  10/06/18 1421    Gram stain [[id number]] Collected:  10/05/18 0116    Order Status:  Completed Specimen:  Body Fluid from Ascites Updated:  10/05/18 0413     Gram Stain Result No WBC's      No organisms seen    Fungus culture [[id number]] Collected:  10/05/18 0116    Order Status:  Sent Specimen:  Body Fluid from Ascites Updated:  10/05/18 0139          Significant Imaging: I have reviewed all pertinent imaging results/findings within the past 24 hours.

## 2018-10-09 NOTE — SUBJECTIVE & OBJECTIVE
"Interval HPI:  Overnight events: remains in TSU. BG elevated overnight but trending down to goal this AM with insulin infusion at 2 u/hr; prandial coverage and correction scale. Solumedrol 40 mg BID; standard steroid taper.   Eatin%  Nausea: No  Hypoglycemia and intervention: No  Fever: No  TPN and/or TF: No    /78   Pulse 106   Temp 97.5 °F (36.4 °C) (Oral)   Resp 16   Ht 5' 3" (1.6 m)   Wt 67.7 kg (149 lb 3.2 oz)   LMP  (LMP Unknown)   SpO2 100%   Breastfeeding? No   BMI 26.43 kg/m²     Labs Reviewed and Include    Recent Labs   Lab  10/09/18   0600   GLU  154*   CALCIUM  7.8*   ALBUMIN  2.5*   PROT  3.6*   NA  129*   K  4.2   CO2  18*   CL  100   BUN  68*   CREATININE  1.5*   ALKPHOS  137*   ALT  242*   AST  125*   BILITOT  13.7*     Lab Results   Component Value Date    WBC 5.31 10/09/2018    HGB 9.2 (L) 10/09/2018    HCT 26.2 (L) 10/09/2018    MCV 88 10/09/2018    PLT 71 (L) 10/09/2018     No results for input(s): TSH, FREET4 in the last 168 hours.  Lab Results   Component Value Date    HGBA1C 6.0 (H) 10/04/2018       Nutritional status:   Body mass index is 26.43 kg/m².  Lab Results   Component Value Date    ALBUMIN 2.5 (L) 10/09/2018    ALBUMIN 2.4 (L) 10/08/2018    ALBUMIN 2.7 (L) 10/07/2018     Lab Results   Component Value Date    PREALBUMIN 12 (L) 2018       Estimated Creatinine Clearance: 32.7 mL/min (A) (based on SCr of 1.5 mg/dL (H)).    Accu-Checks  Recent Labs      10/07/18   1751  10/07/18   2111  10/08/18   0208  10/08/18   0723  10/08/18   1144  10/08/18   1535  10/08/18   1809  10/08/18   2129  10/09/18   0204  10/09/18   0813   POCTGLUCOSE  262*  252*  291*  252*  314*  358*  260*  236*  209*  149*       Current Medications and/or Treatments Impacting Glycemic Control  Immunotherapy:    Immunosuppressants         Stop Route Frequency     mycophenolate capsule 1,000 mg      -- Oral 2 times daily        Steroids:   Hormones (From admission, onward)    Start     Stop " Route Frequency Ordered    10/11/18 0900  predniSONE tablet 20 mg  (methylprednisolone taper panel)      -- Oral Daily 10/05/18 0709    10/10/18 0900  methylPREDNISolone sodium succinate injection 20 mg  (methylprednisolone taper panel)      10/11 0859 IV Every 12 hours 10/05/18 0709    10/09/18 0900  methylPREDNISolone sodium succinate injection 40 mg  (methylprednisolone taper panel)      10/10 0859 IV Every 12 hours 10/05/18 0709        Pressors:    Autonomic Drugs (From admission, onward)    Start     Stop Route Frequency Ordered    10/08/18 1500  midodrine tablet 10 mg      -- Oral 3 times daily 10/08/18 1008    10/06/18 1617  rocuronium (ZEMURON) 10 mg/mL injection     Comments:  Created by cabinet override    10/07 0429   10/06/18 1617        Hyperglycemia/Diabetes Medications:   Antihyperglycemics (From admission, onward)    Start     Stop Route Frequency Ordered    10/07/18 1200  insulin regular (Humulin R) 100 Units in sodium chloride 0.9% 100 mL infusion      -- IV Continuous 10/07/18 1055    10/07/18 1200  insulin aspart U-100 pen 2-4 Units      -- SubQ 3 times daily with meals 10/07/18 1055    10/07/18 1155  insulin aspart U-100 pen 0-4 Units      -- SubQ As needed (PRN) 10/07/18 1055

## 2018-10-09 NOTE — PROGRESS NOTES
[facility]  Liver Transplant  Progress Note    Patient Name: Scarlett Reddy  MRN: [id number]  Admission Date: 10/1/2018  Hospital Length of Stay: 7 days  Code Status: Full Code  Primary Care Provider: Primary Doctor No  Post-Operative Day: 4    ORGAN:   LIVER  Disease Etiology: Cirrhosis: Type B and D  Donor Type:    - Brain Death  CDC High Risk:   No  Donor CMV Status:   Donor CMV Status: Positive  Donor HBcAB:   Negative  Donor HCV Status:   Negative  Donor HBV SETH: Negative  Donor HCV SETH: Negative  Whole or Partial: Whole Liver  Biliary Anastomosis: End to End  Arterial Anatomy: Replaced Left Hepatic and Right Hepatic  Subjective:     History of Present Illness:  Ms. Reddy is a 67 y/o female with PMH of ESLD secondary Hep B and D.  Listed for liver transplant with MELD 23.  Paracentesis 10/1 with 5L removed, no fluid sent for analysis.  Morning labs significant for hyponatremia, Na 119.  Pt admitted to LTS for sodium monitoring.        Hospital Course:  Patient now s/p liver transplant on 10/5. Steroid induction protocol, DBD liver. Pt with takeback for hyperbilirubinemia and bilious VALORIE output 10/6; no biliary leak identified. Mild peritransplant ascites- repeat US 10/6 with increased arterial resistive indices, suggestive of edema vs acute rejection vs congestion. Positive urine culture with Klebsiella noted pre-transplant (10/4).  Pt received treatment with Zosyn and cipro.     Interval history: Pt transferred from ICU overnight. No acute events noted. LFTs cont to trend down. T bili trending down (16.4-->13.7). Pt w/ lower extremity edema- Albumin 25% x 2. Lasix 20mg x 1. Repeat US pending. Will remove lateral VALORIE drain today. Pain controlled. +Bm. VSS. No further complaints at this time.     No new subjective & objective note has been filed under this hospital service since the last note was generated.    Assessment/Plan:     * Liver transplanted    -pmhx of  ESLD secondary to hep B  cirrhosis, now s/p liver transplant on 10/5, with takeback for hyperbilirubinemia and bilious VALORIE output 10/6; no biliary leak identified.   -POD 1 US: increased arterial resistive indices, suggestive of edema vs cute rejection vs congestion  -Repeat US today pending   -LFTs trending down  -T bili elevated, but trending down. Started ursodiol           Metabolic acidosis    Started Na bicarb 1300 TID          At risk for opportunistic infections    -Valcyte for CMV prophylaxis --> will start at discharge or  POD #10 in hospital  - Bactrim for PCP prophylaxis   -Nystatin for fungal prophylaxis           Long-term use of immunosuppressant medication    Cellcept/Prograf/pred  --elevated prograf levels, holding dose at this time  Will monitor for signs of toxic side effects, check daily tacrolimus troughs, and change meds accordingly.          Prophylactic immunotherapy    See long term use immunosuppresion          Ascites    - paracentesis 10/1, 5L removed, no fluid sent for analysis.  - diuresing with albumin and lasix 10/9           Physical deconditioning    -PT/OT consulted. Will appreciate recs          Hyponatremia    - Na 119 on admit.  -Na 129 today  -alb 25% x 2  -Diurese w/ lasix 20mg x 1 and assess response                Hypotension    - SBP 78/46 manual.   - Albumin 5% 250 ml x 1 dose 10/3/18  - BP improved to 90/60s.  - continue Midodrine 15 mg TID.           Severe malnutrition    - dietary consulted.   -temporal and distal extremity muscle wasting and edema  -supplements ordered          Type 2 diabetes mellitus with diabetic polyneuropathy, with long-term current use of insulin    - continue home regimen.  - endocrine consulted.          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                VTE Risk Mitigation (From admission, onward)        Ordered     Place sequential compression device  Until discontinued      10/05/18 0709     heparin (porcine) injection 5,000 Units  Every 8 hours      10/01/18 1617          The patients clinical status was discussed at multidisplinary rounds, involving transplant surgery, transplant medicine, pharmacy, nursing, nutrition, and social work    Discharge Planning:  No Patient Care Coordination Note on file.      Jihan Soares PA-C  Liver Transplant  [facility]

## 2018-10-09 NOTE — ASSESSMENT & PLAN NOTE
- dietary consulted.   -temporal and distal extremity muscle wasting and edema  -supplements ordered

## 2018-10-09 NOTE — PLAN OF CARE
Problem: Physical Therapy Goal  Goal: Physical Therapy Goal  Goals to be met by: 10/22/2018    Patient will increase functional independence with mobility by performin. Supine to sit with Contact Guard Assistance - not met  2. Sit to stand transfer with Supervision using LRAD or no AD - not met  3. Bed to chair transfer with Supervision using LRAD or no AD - not met  4. Gait  x 250 feet with Supervision using LRAD or no AD - not met  5. Stand for 3 minutes with Supervision to increase activity tolerance - not met   6. Lower extremity exercise program x15 reps per handout, with independence - not met     Outcome: Ongoing (interventions implemented as appropriate)  Treatment completed and no goals met. Goals appropriate

## 2018-10-09 NOTE — CONSULTS
Consult received. Full note to follow.    Please call for questions.    Thanks,  Lisa Benson MD  Infectious Disease Fellow, PGY-5  Pager: [phone] or ext: [phone]  [facility]

## 2018-10-09 NOTE — ASSESSMENT & PLAN NOTE
- Na 119 on admit.  -Na 129 today  -alb 25% x 2  -Diurese w/ lasix 20mg x 1 and assess response

## 2018-10-09 NOTE — ASSESSMENT & PLAN NOTE
BG goal 140 - 180     Continue transition IV insulin drip to 2 u/hr  Increase Novolog 3-5 units with meals   2 units PRN protein shake  Low dose correction scale insulin  BG monitoring AC/HS/0200      Monitor for prandial excursions    Discharge planning:  [place]

## 2018-10-09 NOTE — ASSESSMENT & PLAN NOTE
-Valcyte for CMV prophylaxis --> will start at discharge or  POD #10 in hospital  - Bactrim for PCP prophylaxis   -Nystatin for fungal prophylaxis

## 2018-10-09 NOTE — ASSESSMENT & PLAN NOTE
69 y/o female with PMHx Hepatitis B, D cirrhosis. Admitted 10/1/18 underwent liver transplant 10/5/18 CMV D+/R+ steroid induction on maintenance MM/tacro/pred. Consulted to comment on positive diphtheroid culture from ascitic fluid. Do not believe culture reflects a true pathogen can be skin contaminant. Patient has been afebrile with no abdominal pain. WBC decreased from 9k to 5k. Positive urine culture with ESBL Kleb sensitive to pip/tazo and cipro; treated with 4 days of pip/tazo for uncomplicated cystitis would not treat longer. Would recommend to stop antibiotics at time and monitor progress. Will sign off thank you for the consult. Please call back if new developments occur.      Recommendations   - discontinue pip/tazo   - continue ppx antibiotics as per protocol   - will sign off please call back with new developments

## 2018-10-09 NOTE — HPI
Case of 69 y/o female with PMHx Hepatitis B, D cirrhosis. Admitted 10/1/18 underwent liver transplant 10/5/18 CMV D+/R+ steroid induction on maintenance MM/tacro/pred. She was taken back to the OR on 10/6 due to increasing LFT's and suspected biliary leak.  On revision no leak was identified. Patient has been on pip/tazo to cover possible infections and on ppx therapy with bactrim, fluconazole, valgancyclovir; on entecavir for hepatitis B. ID consulted to comment on ESBL Kelb UTI and diphtheroids from ascitic fluid.

## 2018-10-09 NOTE — ASSESSMENT & PLAN NOTE
Managed per primary team  Avoid hypoglycemia    Recent Labs   Lab  10/09/18   0600   ALT  242*   AST  125*   ALKPHOS  137*   BILITOT  13.7*   PROT  3.6*   ALBUMIN  2.5*

## 2018-10-09 NOTE — CONSULTS
[facility]  Infectious Disease  Consult Note    Patient Name: Scarlett Reddy  MRN: [id number]  Admission Date: 10/1/2018  Hospital Length of Stay: 7 days  Attending Physician: Benson Matson MD  Primary Care Provider: Primary Doctor No     Isolation Status: Contact    Patient information was obtained from patient and ER records.      Consults  Assessment/Plan:     Ascites    69 y/o female with PMHx Hepatitis B, D cirrhosis. Admitted 10/1/18 underwent liver transplant 10/5/18 CMV D+/R+ steroid induction on maintenance MM/tacro/pred. Consulted to comment on positive diphtheroid culture from ascitic fluid. Do not believe culture reflects a true pathogen can be skin contaminant. Patient has been afebrile with no abdominal pain. WBC decreased from 9k to 5k. Positive urine culture with ESBL Kleb sensitive to pip/tazo and cipro; treated with 4 days of pip/tazo for uncomplicated cystitis would not treat longer. Would recommend to stop antibiotics at time and monitor progress. Will sign off thank you for the consult. Please call back if new developments occur.      Recommendations   - discontinue pip/tazo   - continue ppx antibiotics as per protocol   - will sign off please call back with new developments                Thank you for your consult. I will sign off. Please contact us if you have any additional questions.    Lias Xavier MD  Infectious Disease  [facility]    Subjective:     Principal Problem: Liver transplanted    HPI: Case of 69 y/o female with PMHx Hepatitis B, D cirrhosis. Admitted 10/1/18 underwent liver transplant 10/5/18 CMV D+/R+ steroid induction on maintenance MM/tacro/pred. She was taken back to the OR on 10/6 due to increasing LFT's and suspected biliary leak.  On revision no leak was identified. Patient has been on pip/tazo to cover possible infections and on ppx therapy with bactrim, fluconazole, valgancyclovir; on entecavir for hepatitis B. ID  consulted to comment on ESBL Kelb UTI and diphtheroids from ascitic fluid.      Past Medical History:   Diagnosis Date    Anemia requiring transfusions 8/16/2018    Ascites     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 8/15/2018    Cirrhosis     Cough 8/30/2018    Esophageal varices     Esophageal varices without bleeding 8/15/2018    Hepatic encephalopathy     Hepatitis B     Hepatitis D virus infection     Hypersplenism     Hyponatremia     Hypotension     Liver transplant candidate     Neutropenia     Portal hypertension     Severe malnutrition 8/16/2018    Type 2 diabetes mellitus, with long-term current use of insulin 8/15/2018       Past Surgical History:   Procedure Laterality Date    EGD (ESOPHAGOGASTRODUODENOSCOPY) N/A 8/17/2018    Performed by Travon Delgadillo MD at [facility] ([address] [place] [address])    ESOPHAGOGASTRODUODENOSCOPY N/A 8/17/2018    Procedure: EGD (ESOPHAGOGASTRODUODENOSCOPY);  Surgeon: Travon Delgadillo MD;  Location: [facility] ([address] [place] [address]);  Service: Endoscopy;  Laterality: N/A;    EXPLORATORY LAPAROTOMY AFTER LIVER TRANSPLANTATION N/A 10/6/2018    Procedure: LAPAROTOMY, EXPLORATORY, AFTER LIVER TRANSPLANT;  Surgeon: Benson Matson MD;  Location: [facility] OR [address] [place] [address];  Service: Transplant;  Laterality: N/A;    LAPAROTOMY, EXPLORATORY, AFTER LIVER TRANSPLANT N/A 10/6/2018    Performed by Benson Matson MD at [facility] OR [address] [place] [address]    LIVER TRANSPLANT N/A 10/4/2018    Procedure: TRANSPLANT, LIVER;  Surgeon: Yony Burns MD;  Location: [facility] OR [address] [place] [address];  Service: Transplant;  Laterality: N/A;    TRANSPLANT, LIVER N/A 10/4/2018    Performed by Yony Burns MD at [facility] OR [address] [place] [address]       Review of patient's allergies indicates:  No Known Allergies    Medications:  Medications Prior to Admission   Medication Sig    insulin aspart U-100 (NOVOLOG FLEXPEN U-100 INSULIN) 100 unit/mL InPn pen Inject 20 units into the skin with breakfast,17 units with lunch, and 17 with dinner Plus  "correction scale, max TDD 60 units daily    insulin glargine (LANTUS) 100 unit/mL injection Inject 20 Units into the skin every evening.    midodrine (PROAMATINE) 5 MG Tab Take 3 tablets (15 mg total) by mouth every 8 (eight) hours.    OMEGA-3 FATTY ACIDS-EPA ORAL Take by mouth.    rifAXIMin (XIFAXAN) 550 mg Tab Take 1 tablet (550 mg total) by mouth 2 (two) times daily.    sodium bicarbonate 650 MG tablet Take 2 tablets (1,300 mg total) by mouth 3 (three) times daily.    [DISCONTINUED] ergocalciferol, vitamin D2, (VITAMIN D ORAL) Take by mouth.    blood sugar diagnostic (ACCU-CHEK DC) Strp 1 strip by Misc.(Non-Drug; Combo Route) route 3 (three) times daily.    pen needle, diabetic (BD ULTRA-FINE CITLALI PEN NEEDLE) 32 gauge x 5/32" Ndle To use to inject insulin 4x daily     Antibiotics (From admission, onward)    Start     Stop Route Frequency Ordered    10/10/18 0900  sulfamethoxazole-trimethoprim 400-80mg per tablet 1 tablet      -- Oral Every Mon, Wed, Fri 10/08/18 1008        Antifungals (From admission, onward)    Start     Stop Route Frequency Ordered    10/06/18 0915  fluconazole tablet 200 mg      -- Oral Daily 10/06/18 0807        Antivirals (From admission, onward)        Stop Route Frequency     valganciclovir 50 mg/ml      -- PER NG TUBE Daily     entecavir      -- Oral Every 48 hours             There is no immunization history on file for this patient.    Family History     None        Social History     Socioeconomic History    Marital status:      Spouse name: None    Number of children: None    Years of education: None    Highest education level: None   Social Needs    Financial resource strain: None    Food insecurity - worry: None    Food insecurity - inability: None    Transportation needs - medical: None    Transportation needs - non-medical: None   Occupational History    None   Tobacco Use    Smoking status: Never Smoker    Smokeless tobacco: Never Used   Substance " and Sexual Activity    Alcohol use: No     Frequency: Never    Drug use: No    Sexual activity: None   Other Topics Concern    None   Social History Narrative    None     Review of Systems   Constitutional: Negative for chills and fever.   HENT: Negative for mouth sores, rhinorrhea and sore throat.    Respiratory: Negative for cough and shortness of breath.    Gastrointestinal: Negative for abdominal distention, abdominal pain, diarrhea, nausea and vomiting.   Skin: Negative for rash.     Objective:     Vital Signs (Most Recent):  Temp: 98 °F (36.7 °C) (10/09/18 1600)  Pulse: 100 (10/09/18 1600)  Resp: 12 (10/09/18 1600)  BP: 136/86 (10/09/18 1600)  SpO2: 100 % (10/09/18 1600) Vital Signs (24h Range):  Temp:  [97.5 °F (36.4 °C)-98 °F (36.7 °C)] 98 °F (36.7 °C)  Pulse:  [] 100  Resp:  [12-17] 12  SpO2:  [96 %-100 %] 100 %  BP: (118-136)/(78-86) 136/86     Weight: 67.7 kg (149 lb 3.2 oz)  Body mass index is 26.43 kg/m².    Estimated Creatinine Clearance: 32.7 mL/min (A) (based on SCr of 1.5 mg/dL (H)).    Physical Exam   Constitutional: She is oriented to person, place, and time. She appears well-developed and well-nourished.   HENT:   Head: Normocephalic and atraumatic.   Eyes: EOM are normal. Pupils are equal, round, and reactive to light.   Neck: Normal range of motion.   Cardiovascular: Normal rate, regular rhythm and normal heart sounds.   Pulmonary/Chest: Effort normal and breath sounds normal.   Abdominal: Soft. Bowel sounds are normal. She exhibits no distension. There is no tenderness.   Incision clean no drainage visible.   Musculoskeletal: Normal range of motion. She exhibits no edema.   Neurological: She is alert and oriented to person, place, and time.   Skin: No rash noted.       Significant Labs:   Blood Culture:   Recent Labs   Lab  08/17/18   2236  08/30/18   1545  08/30/18   1550  09/17/18   1050  09/17/18   1200   LABBLOO  No growth after 5 days.  No growth after 5 days.  No growth after  5 days.  No growth after 5 days.  No growth after 5 days.  No growth after 5 days.     BMP:   Recent Labs   Lab  10/09/18   0600   GLU  154*   NA  129*   K  4.2   CL  100   CO2  18*   BUN  68*   CREATININE  1.5*   CALCIUM  7.8*   MG  1.9     CBC:   Recent Labs   Lab  10/07/18   2130  10/08/18   0200  10/09/18   0600   WBC  9.14  6.40  5.31   HGB  12.1  10.6*  9.2*   HCT  34.4*  31.6*  26.2*   PLT  110*  82*  71*     CMP:   Recent Labs   Lab  10/08/18   0200  10/09/18   0600   NA  131*  129*   K  3.9  4.2   CL  100  100   CO2  19*  18*   GLU  288*  154*   BUN  60*  68*   CREATININE  1.4  1.5*   CALCIUM  7.7*  7.8*   PROT  3.8*  3.6*   ALBUMIN  2.4*  2.5*   BILITOT  16.4*  13.7*   ALKPHOS  133  137*   AST  320*  125*   ALT  547*  242*   ANIONGAP  12  11   EGFRNONAA  38.4*  35.3*     Microbiology Results (last 7 days)     Procedure Component Value Units Date/Time    Urine culture [[id number]] Collected:  10/09/18 1130    Order Status:  Sent Specimen:  Urine, Clean Catch Updated:  10/09/18 1131    Culture, Anaerobe [[id number]] Collected:  10/06/18 1409    Order Status:  Completed Specimen:  Body Fluid from Peritoneal Fluid Updated:  10/09/18 1054     Anaerobic Culture Culture in progress    Aerobic culture [[id number]] Collected:  10/06/18 1409    Order Status:  Completed Specimen:  Body Fluid from Peritoneal Fluid Updated:  10/09/18 0914     Aerobic Bacterial Culture No growth    Culture, Anaerobe [[id number]] Collected:  10/05/18 0116    Order Status:  Completed Specimen:  Body Fluid from Ascites Updated:  10/09/18 0816     Anaerobic Culture Culture in progress    Aerobic culture [[id number]] Collected:  10/05/18 0116    Order Status:  Completed Specimen:  Body Fluid from Ascites Updated:  10/08/18 1349     Aerobic Bacterial Culture --     DIPHTHEROIDS  Rare      Urine culture [[id number]]  (Susceptibility) Collected:  10/04/18 2158    Order Status:  Completed Specimen:  Urine, Clean Catch Updated:  10/06/18 2221     Urine  Culture, Routine --     KLEBSIELLA PNEUMONIAE ESBL  >100,000 cfu/ml  No other significant isolate      Narrative:       On admission    Gram stain [[id number]] Collected:  10/06/18 1409    Order Status:  Completed Specimen:  Body Fluid from Peritoneal Fluid Updated:  10/06/18 1450     Gram Stain Result No WBC's      No organisms seen    Fungus culture [[id number]] Collected:  10/06/18 1409    Order Status:  Sent Specimen:  Body Fluid from Peritoneal Fluid Updated:  10/06/18 1421    Gram stain [[id number]] Collected:  10/05/18 0116    Order Status:  Completed Specimen:  Body Fluid from Ascites Updated:  10/05/18 0413     Gram Stain Result No WBC's      No organisms seen    Fungus culture [[id number]] Collected:  10/05/18 0116    Order Status:  Sent Specimen:  Body Fluid from Ascites Updated:  10/05/18 0139          Significant Imaging: I have reviewed all pertinent imaging results/findings within the past 24 hours.

## 2018-10-09 NOTE — CONSULTS
Consult received. Full note to follow.    Please call for questions.    Thanks,  Lisa Benson MD  Infectious Disease Fellow, PGY-5  Pager: [phone] or ext: [phone]  [facility]

## 2018-10-09 NOTE — ASSESSMENT & PLAN NOTE
Cellcept/Prograf/pred  --elevated prograf levels, holding dose at this time  Will monitor for signs of toxic side effects, check daily tacrolimus troughs, and change meds accordingly.

## 2018-10-09 NOTE — PLAN OF CARE
Problem: Patient Care Overview  Goal: Plan of Care Review  Outcome: Ongoing (interventions implemented as appropriate)  Patient aaox4. Bed kept locked, lowest position with 2x side rails up while in bed. nonslip footwear when out of bed. Ambulates with 1x assistance. Up in chair for most of afternoon. Family at bedside attentive to patient's needs. Telemetry monitoring SR-ST. Abdomen distended. Chevron dedrick with staples, painted with betadine. Generalized edema. Right IJT cdi. Insulin gtt at 2units/hr. Diabetic diet. accuchecks ac/hs, insulin given as ordered. Repeated requests by family to check blood glucose because thinks it may be high. LR at 50ml/hr.  Continued IV antibiotics. 2 right VALORIE drains with sersang output; leaking at site, dressing changed multiple times this shift, team aware; plan to remove 1 drain today or tomorrow. 1x complaint of pain with mild relief from prn medication. given albumin and 20 mg lasix.  Urinalysis and culture sent. Contact precautions starts for ESBL in urine.  Seen by ID this afternoon. Contact precautions maintained.

## 2018-10-10 LAB
ALBUMIN SERPL BCP-MCNC: 3.1 G/DL
ALP SERPL-CCNC: 145 U/L
ALT SERPL W/O P-5'-P-CCNC: 157 U/L
ANION GAP SERPL CALC-SCNC: 17 MMOL/L
APTT BLDCRRT: 38.5 SEC
AST SERPL-CCNC: 81 U/L
BASOPHILS # BLD AUTO: 0 K/UL
BASOPHILS NFR BLD: 0 %
BILIRUB SERPL-MCNC: 14 MG/DL
BUN SERPL-MCNC: 79 MG/DL
CALCIUM SERPL-MCNC: 8.4 MG/DL
CHLORIDE SERPL-SCNC: 100 MMOL/L
CO2 SERPL-SCNC: 17 MMOL/L
CREAT SERPL-MCNC: 1.8 MG/DL
DIFFERENTIAL METHOD: ABNORMAL
EOSINOPHIL # BLD AUTO: 0 K/UL
EOSINOPHIL NFR BLD: 0 %
ERYTHROCYTE [DISTWIDTH] IN BLOOD BY AUTOMATED COUNT: 16.2 %
EST. GFR  (AFRICAN AMERICAN): 32.6 ML/MIN/1.73 M^2
EST. GFR  (NON AFRICAN AMERICAN): 28.3 ML/MIN/1.73 M^2
GLUCOSE SERPL-MCNC: 208 MG/DL
HCT VFR BLD AUTO: 26.1 %
HGB BLD-MCNC: 8.7 G/DL
IMM GRANULOCYTES # BLD AUTO: 0.02 K/UL
IMM GRANULOCYTES NFR BLD AUTO: 0.5 %
INR PPP: 1.3
LYMPHOCYTES # BLD AUTO: 0.1 K/UL
LYMPHOCYTES NFR BLD: 2.5 %
MAGNESIUM SERPL-MCNC: 2 MG/DL
MCH RBC QN AUTO: 29.5 PG
MCHC RBC AUTO-ENTMCNC: 33.3 G/DL
MCV RBC AUTO: 89 FL
MONOCYTES # BLD AUTO: 0.2 K/UL
MONOCYTES NFR BLD: 3.5 %
NEUTROPHILS # BLD AUTO: 4 K/UL
NEUTROPHILS NFR BLD: 93.5 %
NRBC BLD-RTO: 0 /100 WBC
PHOSPHATE SERPL-MCNC: 5.1 MG/DL
PLATELET # BLD AUTO: 58 K/UL
PMV BLD AUTO: 9.9 FL
POCT GLUCOSE: 216 MG/DL (ref 70–110)
POCT GLUCOSE: 216 MG/DL (ref 70–110)
POCT GLUCOSE: 230 MG/DL (ref 70–110)
POCT GLUCOSE: 267 MG/DL (ref 70–110)
POCT GLUCOSE: 84 MG/DL (ref 70–110)
POCT GLUCOSE: 91 MG/DL (ref 70–110)
POTASSIUM SERPL-SCNC: 3.6 MMOL/L
PROT SERPL-MCNC: 4.2 G/DL
PROTHROMBIN TIME: 13.3 SEC
RBC # BLD AUTO: 2.95 M/UL
SODIUM SERPL-SCNC: 134 MMOL/L
TACROLIMUS BLD-MCNC: 8.7 NG/ML
WBC # BLD AUTO: 4.32 K/UL

## 2018-10-10 PROCEDURE — 94664 DEMO&/EVAL PT USE INHALER: CPT

## 2018-10-10 PROCEDURE — 63600175 PHARM REV CODE 636 W HCPCS: Performed by: STUDENT IN AN ORGANIZED HEALTH CARE EDUCATION/TRAINING PROGRAM

## 2018-10-10 PROCEDURE — 97116 GAIT TRAINING THERAPY: CPT

## 2018-10-10 PROCEDURE — 99900035 HC TECH TIME PER 15 MIN (STAT)

## 2018-10-10 PROCEDURE — 63600175 PHARM REV CODE 636 W HCPCS: Performed by: SURGERY

## 2018-10-10 PROCEDURE — 25000003 PHARM REV CODE 250: Performed by: NURSE PRACTITIONER

## 2018-10-10 PROCEDURE — 63600175 PHARM REV CODE 636 W HCPCS: Mod: JG | Performed by: PHYSICIAN ASSISTANT

## 2018-10-10 PROCEDURE — 99233 SBSQ HOSP IP/OBS HIGH 50: CPT | Mod: 24,,, | Performed by: PHYSICIAN ASSISTANT

## 2018-10-10 PROCEDURE — 84100 ASSAY OF PHOSPHORUS: CPT

## 2018-10-10 PROCEDURE — 85025 COMPLETE CBC W/AUTO DIFF WBC: CPT

## 2018-10-10 PROCEDURE — 97530 THERAPEUTIC ACTIVITIES: CPT

## 2018-10-10 PROCEDURE — 85730 THROMBOPLASTIN TIME PARTIAL: CPT

## 2018-10-10 PROCEDURE — 83735 ASSAY OF MAGNESIUM: CPT

## 2018-10-10 PROCEDURE — 25000003 PHARM REV CODE 250: Performed by: STUDENT IN AN ORGANIZED HEALTH CARE EDUCATION/TRAINING PROGRAM

## 2018-10-10 PROCEDURE — 63600175 PHARM REV CODE 636 W HCPCS: Performed by: NURSE PRACTITIONER

## 2018-10-10 PROCEDURE — 80053 COMPREHEN METABOLIC PANEL: CPT

## 2018-10-10 PROCEDURE — 20600001 HC STEP DOWN PRIVATE ROOM

## 2018-10-10 PROCEDURE — 80197 ASSAY OF TACROLIMUS: CPT

## 2018-10-10 PROCEDURE — 25000003 PHARM REV CODE 250: Performed by: TRANSPLANT SURGERY

## 2018-10-10 PROCEDURE — 85610 PROTHROMBIN TIME: CPT

## 2018-10-10 PROCEDURE — 99232 SBSQ HOSP IP/OBS MODERATE 35: CPT | Mod: ,,, | Performed by: NURSE PRACTITIONER

## 2018-10-10 PROCEDURE — 25000003 PHARM REV CODE 250: Performed by: PHYSICIAN ASSISTANT

## 2018-10-10 PROCEDURE — 63600175 PHARM REV CODE 636 W HCPCS: Performed by: TRANSPLANT SURGERY

## 2018-10-10 PROCEDURE — P9047 ALBUMIN (HUMAN), 25%, 50ML: HCPCS | Mod: JG | Performed by: PHYSICIAN ASSISTANT

## 2018-10-10 RX ORDER — ALBUMIN HUMAN 250 G/1000ML
25 SOLUTION INTRAVENOUS ONCE
Status: COMPLETED | OUTPATIENT
Start: 2018-10-10 | End: 2018-10-10

## 2018-10-10 RX ORDER — PREDNISONE 10 MG/1
20 TABLET ORAL DAILY
Status: COMPLETED | OUTPATIENT
Start: 2018-10-11 | End: 2018-10-17

## 2018-10-10 RX ORDER — PREDNISONE 10 MG/1
20 TABLET ORAL 2 TIMES DAILY
Status: COMPLETED | OUTPATIENT
Start: 2018-10-10 | End: 2018-10-10

## 2018-10-10 RX ORDER — LIDOCAINE HYDROCHLORIDE 10 MG/ML
10 INJECTION INFILTRATION; PERINEURAL ONCE
Status: DISCONTINUED | OUTPATIENT
Start: 2018-10-10 | End: 2018-10-12

## 2018-10-10 RX ORDER — ONDANSETRON 2 MG/ML
4 INJECTION INTRAMUSCULAR; INTRAVENOUS ONCE
Status: DISCONTINUED | OUTPATIENT
Start: 2018-10-10 | End: 2018-10-10

## 2018-10-10 RX ORDER — ONDANSETRON 2 MG/ML
4 INJECTION INTRAMUSCULAR; INTRAVENOUS ONCE
Status: COMPLETED | OUTPATIENT
Start: 2018-10-10 | End: 2018-10-10

## 2018-10-10 RX ORDER — SODIUM BICARBONATE 650 MG/1
1300 TABLET ORAL 4 TIMES DAILY
Status: DISCONTINUED | OUTPATIENT
Start: 2018-10-10 | End: 2018-10-17

## 2018-10-10 RX ORDER — MIDODRINE HYDROCHLORIDE 5 MG/1
15 TABLET ORAL 3 TIMES DAILY
Status: DISCONTINUED | OUTPATIENT
Start: 2018-10-10 | End: 2018-11-28

## 2018-10-10 RX ORDER — TACROLIMUS 0.5 MG/1
0.5 CAPSULE ORAL ONCE
Status: COMPLETED | OUTPATIENT
Start: 2018-10-10 | End: 2018-10-10

## 2018-10-10 RX ORDER — FLUCONAZOLE 200 MG/1
200 TABLET ORAL DAILY
Qty: 30 TABLET | Refills: 0 | Status: CANCELLED | OUTPATIENT
Start: 2018-10-10 | End: 2018-11-09

## 2018-10-10 RX ORDER — CALCIUM CARBONATE 200(500)MG
500 TABLET,CHEWABLE ORAL 3 TIMES DAILY PRN
Status: DISCONTINUED | OUTPATIENT
Start: 2018-10-11 | End: 2018-12-21 | Stop reason: HOSPADM

## 2018-10-10 RX ADMIN — FLUCONAZOLE 200 MG: 200 TABLET ORAL at 09:10

## 2018-10-10 RX ADMIN — CALCIUM CARBONATE (ANTACID) CHEW TAB 500 MG 500 MG: 500 CHEW TAB at 11:10

## 2018-10-10 RX ADMIN — HEPARIN SODIUM 5000 UNITS: 5000 INJECTION, SOLUTION INTRAVENOUS; SUBCUTANEOUS at 01:10

## 2018-10-10 RX ADMIN — ENTECAVIR 0.5 MG: 0.5 TABLET ORAL at 09:10

## 2018-10-10 RX ADMIN — URSODIOL 300 MG: 300 CAPSULE ORAL at 09:10

## 2018-10-10 RX ADMIN — ALBUMIN HUMAN 25 G: 0.25 SOLUTION INTRAVENOUS at 12:10

## 2018-10-10 RX ADMIN — SODIUM BICARBONATE 650 MG TABLET 1300 MG: at 12:10

## 2018-10-10 RX ADMIN — HEPATITIS B IMMUNE GLOBULIN (HUMAN) 9999.91 UNITS: 312 INJECTION, SOLUTION INTRAMUSCULAR; INTRAVENOUS at 09:10

## 2018-10-10 RX ADMIN — SODIUM BICARBONATE 650 MG TABLET 1300 MG: at 08:10

## 2018-10-10 RX ADMIN — ONDANSETRON 4 MG: 2 INJECTION INTRAMUSCULAR; INTRAVENOUS at 06:10

## 2018-10-10 RX ADMIN — INSULIN ASPART 3 UNITS: 100 INJECTION, SOLUTION INTRAVENOUS; SUBCUTANEOUS at 09:10

## 2018-10-10 RX ADMIN — HEPARIN SODIUM 5000 UNITS: 5000 INJECTION, SOLUTION INTRAVENOUS; SUBCUTANEOUS at 06:10

## 2018-10-10 RX ADMIN — INSULIN ASPART 2 UNITS: 100 INJECTION, SOLUTION INTRAVENOUS; SUBCUTANEOUS at 01:10

## 2018-10-10 RX ADMIN — SODIUM BICARBONATE 650 MG TABLET 1300 MG: at 05:10

## 2018-10-10 RX ADMIN — MIDODRINE HYDROCHLORIDE 10 MG: 5 TABLET ORAL at 09:10

## 2018-10-10 RX ADMIN — MIDODRINE HYDROCHLORIDE 15 MG: 5 TABLET ORAL at 02:10

## 2018-10-10 RX ADMIN — FAMOTIDINE 20 MG: 20 TABLET ORAL at 08:10

## 2018-10-10 RX ADMIN — INSULIN ASPART 1 UNITS: 100 INJECTION, SOLUTION INTRAVENOUS; SUBCUTANEOUS at 09:10

## 2018-10-10 RX ADMIN — MYCOPHENOLATE MOFETIL 1000 MG: 250 CAPSULE ORAL at 08:10

## 2018-10-10 RX ADMIN — TACROLIMUS 0.5 MG: 0.5 CAPSULE ORAL at 12:10

## 2018-10-10 RX ADMIN — SODIUM BICARBONATE 650 MG TABLET 1300 MG: at 09:10

## 2018-10-10 RX ADMIN — ONDANSETRON 8 MG: 8 TABLET, ORALLY DISINTEGRATING ORAL at 08:10

## 2018-10-10 RX ADMIN — PREDNISONE 20 MG: 10 TABLET ORAL at 08:10

## 2018-10-10 RX ADMIN — INSULIN ASPART 5 UNITS: 100 INJECTION, SOLUTION INTRAVENOUS; SUBCUTANEOUS at 01:10

## 2018-10-10 RX ADMIN — HEPARIN SODIUM 5000 UNITS: 5000 INJECTION, SOLUTION INTRAVENOUS; SUBCUTANEOUS at 08:10

## 2018-10-10 RX ADMIN — PREDNISONE 20 MG: 10 TABLET ORAL at 09:10

## 2018-10-10 RX ADMIN — MYCOPHENOLATE MOFETIL 1000 MG: 250 CAPSULE ORAL at 09:10

## 2018-10-10 RX ADMIN — MIDODRINE HYDROCHLORIDE 15 MG: 5 TABLET ORAL at 08:10

## 2018-10-10 RX ADMIN — SULFAMETHOXAZOLE AND TRIMETHOPRIM 1 TABLET: 400; 80 TABLET ORAL at 09:10

## 2018-10-10 RX ADMIN — URSODIOL 300 MG: 300 CAPSULE ORAL at 08:10

## 2018-10-10 NOTE — NURSING
Pt refused the correction insulin due to not being able to eat a meal @ the moment. Zofran iv given. Will continue to monitor pt.

## 2018-10-10 NOTE — PLAN OF CARE
Problem: Physical Therapy Goal  Goal: Physical Therapy Goal  Goals to be met by: 10/22/2018    Patient will increase functional independence with mobility by performin. Supine to sit with Contact Guard Assistance - not met  2. Sit to stand transfer with Supervision using LRAD or no AD - not met  3. Bed to chair transfer with Supervision using LRAD or no AD - not met  4. Gait  x 250 feet with Supervision using LRAD or no AD - not met  5. Stand for 3 minutes with Supervision to increase activity tolerance - not met   6. Lower extremity exercise program x15 reps per handout, with independence - not met      Outcome: Ongoing (interventions implemented as appropriate)  Goals reviewed and remain appropriate. Pt progressing towards goals.    Radha Nguyen, PT, DPT   10/10/2018  [phone]

## 2018-10-10 NOTE — NURSING
Pt insulin gtt titrated off overnight.  at breakfast check. Aviva, NP notified and will review pt's orders and adjust as needed. Will monitor for any order changes.

## 2018-10-10 NOTE — ASSESSMENT & PLAN NOTE
Cellcept/Prograf/pred  Held prograf due to elevated levels -- trending down  Prograf 0.5mg x 1 given today  Will monitor for signs of toxic side effects, check daily tacrolimus troughs, and change meds accordingly.

## 2018-10-10 NOTE — PROGRESS NOTES
"[facility]  Endocrinology  Progress Note    Admit Date: 10/1/2018     Reason for Consult: Management of type 2 DM, Hyperglycemia      Surgical Procedure and Date: Liver transplant 10/5/18, Ex lap 10/6/18     Diabetes diagnosis year:      Home Diabetes Medications:  Lantus 18 units HS and novolog 17 units with meals plus correction scale (150-200 +1)        Lab Results   Component Value Date     HGBA1C 6.8 (H) 2018        How often checking glucose at home? 3-4   BG readings on regimen: 120-150.  Post prandial readings as high as upper 200s  Hypoglycemia on the regimen? yes, none recently   Missed doses on regimen?  No     Diabetes Complications include:     Diabetic peripheral neuropathy      Complicating diabetes co morbidities:   CIRRHOSIS        HPI:  Patient is a 68 y.o. female with a diagnosis of ESLD, listed for liver transplant with meld 23 who underwent live transplant on 10/5/18.  Back to OR on 10/6/18 for ex lap.  Admitted 10/1/18 for hyponatremia s/p paracentesis. She speaks primarily Chinese. Information obtained from recent past admission and discussion over the phone via  (Dunia). Family not at bedside. Pt request family at bedside for future conversations. Personal has known diagnosis of diabetes, endocrine consulted for diabetes management.        Interval HPI:   Overnight events: remains in TSU. BG variable overnight on insulin infusion at 2 u/hr; correction scale given as ordered. BG trending down and insulin infusion suspended per protocol. Creatinine elevated at 1.8. Solumedrol 20 mg BID; standard steroid taper.  at bedside; interpretor Dunia called to review plan of care.   Eatin% -75%  Nausea: No  Hypoglycemia and intervention: No  Fever: No  TPN and/or TF: No    /62   Pulse 86   Temp 97.5 °F (36.4 °C) (Oral)   Resp 11   Ht 5' 3" (1.6 m)   Wt 66 kg (145 lb 8 oz)   LMP  (LMP Unknown)   SpO2 98%   Breastfeeding? No   BMI 25.77 " kg/m²      Labs Reviewed and Include    Recent Labs   Lab  10/10/18   0600   GLU  208*   CALCIUM  8.4*   ALBUMIN  3.1*   PROT  4.2*   NA  134*   K  3.6   CO2  17*   CL  100   BUN  79*   CREATININE  1.8*   ALKPHOS  145*   ALT  157*   AST  81*   BILITOT  14.0*     Lab Results   Component Value Date    WBC 4.32 10/10/2018    HGB 8.7 (L) 10/10/2018    HCT 26.1 (L) 10/10/2018    MCV 89 10/10/2018    PLT 58 (L) 10/10/2018     No results for input(s): TSH, FREET4 in the last 168 hours.  Lab Results   Component Value Date    HGBA1C 6.0 (H) 10/04/2018       Nutritional status:   Body mass index is 25.77 kg/m².  Lab Results   Component Value Date    ALBUMIN 3.1 (L) 10/10/2018    ALBUMIN 2.5 (L) 10/09/2018    ALBUMIN 2.4 (L) 10/08/2018     Lab Results   Component Value Date    PREALBUMIN 12 (L) 09/17/2018       Estimated Creatinine Clearance: 26.9 mL/min (A) (based on SCr of 1.8 mg/dL (H)).    Accu-Checks  Recent Labs      10/08/18   1535  10/08/18   1809  10/08/18   2129  10/09/18   0204  10/09/18   0813  10/09/18   1402  10/09/18   2118  10/10/18   0202  10/10/18   0237  10/10/18   0814   POCTGLUCOSE  358*  260*  236*  209*  149*  243*  202*  84  91  230*       Current Medications and/or Treatments Impacting Glycemic Control  Immunotherapy:    Immunosuppressants         Stop Route Frequency     mycophenolate capsule 1,000 mg      -- Oral 2 times daily        Steroids:   Hormones (From admission, onward)    Start     Stop Route Frequency Ordered    10/11/18 0900  predniSONE tablet 20 mg      10/18 0859 Oral Daily 10/10/18 0829    10/10/18 0930  predniSONE tablet 20 mg      10/11 0859 Oral 2 times daily 10/10/18 0829        Pressors:    Autonomic Drugs (From admission, onward)    Start     Stop Route Frequency Ordered    10/08/18 1500  midodrine tablet 10 mg      -- Oral 3 times daily 10/08/18 1008    10/06/18 1617  rocuronium (ZEMURON) 10 mg/mL injection     Comments:  Created by cabinet override    10/07 0429   10/06/18 8731         Hyperglycemia/Diabetes Medications:   Antihyperglycemics (From admission, onward)    Start     Stop Route Frequency Ordered    10/09/18 1645  insulin aspart U-100 pen 3-5 Units      -- SubQ 3 times daily with meals 10/09/18 1417    10/09/18 1535  insulin aspart U-100 pen 2 Units      -- SubQ 3 times daily PRN 10/09/18 1436    10/07/18 1200  insulin regular (Humulin R) 100 Units in sodium chloride 0.9% 100 mL infusion      -- IV Continuous 10/07/18 1055    10/07/18 1155  insulin aspart U-100 pen 0-4 Units      -- SubQ As needed (PRN) 10/07/18 1055          ASSESSMENT and PLAN    * Liver transplanted    Managed per primary team  Avoid hypoglycemia    Recent Labs   Lab  10/10/18   0600   ALT  157*   AST  81*   ALKPHOS  145*   BILITOT  14.0*   PROT  4.2*   ALBUMIN  3.1*               Type 2 diabetes mellitus with diabetic polyneuropathy, with long-term current use of insulin    BG goal 140 - 180     Restart transition IV insulin drip at  1.6  u/hr  Continue Novolog 3-5 units with meals   2 units PRN protein shake  Low dose correction scale insulin  BG monitoring AC/HS/0200      Discharge planning:  TBD        Adverse effect of corticosteroids    On standard steroid taper per transplant team; may elevate BG readings            Prophylactic immunotherapy    May increase insulin resistance.           Severe malnutrition    Oral intake encouraged, may affect BG readings            Aviva Caldera NP  Endocrinology  [facility]

## 2018-10-10 NOTE — PLAN OF CARE
Problem: Patient Care Overview  Goal: Plan of Care Review  Outcome: Ongoing (interventions implemented as appropriate)  Recommendation/Intervention:   1.Encourage increased PO intake and OS intake as tolerated.   2. Dietitian Following    Goals:   1. Patient to meet >85% EEN/EPN.   2. Promote nutrition related labs WNL  Nutrition Goal Status: progressing towards goal    Nutrition Discharge Planning: Post transplant nutrition education provided. Food safety/drug interactions emphasized. General healthy diet recommended. Dietitian name/contact information, education material left.  No other needs identified. Caregiver present.     NFPE completed 10/3/18  Severe malnutrition     Nutrition Problem  Severe Malnutrition in the context of Chronic Illness/Injury     Related to (etiology):  ESLD     Signs and Symptoms (as evidenced by):  Energy Intake: <75% of estimated energy requirement for 2 months  Body Fat Depletion: Severe depletion of orbitals and triceps   Muscle Mass Depletion: Severe depletion of temples, clavicle region and lower extremities   Weight Loss: 12.31% x 1 month     Nutrition Diagnosis Status:  Continues     Full assessment completed, see Dietitian Note 10/10/2018.

## 2018-10-10 NOTE — SUBJECTIVE & OBJECTIVE
Scheduled Meds:   entecavir  0.5 mg Oral Q48H    ergocalciferol  50,000 Units Oral Q7 Days    famotidine  20 mg Oral QHS    fluconazole  200 mg Oral Daily    heparin (porcine)  5,000 Units Subcutaneous Q8H    hepatitis B immune globulin (HEPA-RALPH B) IVPB  9,999.912 Units Intravenous Daily    insulin aspart U-100  3-5 Units Subcutaneous TIDWM    lidocaine HCL 10 mg/ml (1%)  10 mL Other Once    midodrine  15 mg Oral TID    mirtazapine  7.5 mg Oral QHS    mycophenolate  1,000 mg Oral BID    polyethylene glycol  17 g Oral Daily    predniSONE  20 mg Oral BID    Followed by    [START ON 10/11/2018] predniSONE  20 mg Oral Daily    sodium bicarbonate  1,300 mg Oral QID    sulfamethoxazole-trimethoprim 400-80mg  1 tablet Oral Every Mon, Wed, Fri    ursodiol  300 mg Oral BID    [START ON 10/15/2018] valganciclovir 50 mg/ml  450 mg Per NG tube Daily     Continuous Infusions:   insulin (HUMAN R) infusion (adults) 1.6 Units/hr (10/10/18 1019)     PRN Meds:bisacodyl, dextrose 50%, dextrose 50%, glucagon (human recombinant), glucose, glucose, insulin aspart U-100, insulin aspart U-100, ondansetron, oxyCODONE, oxyCODONE, promethazine (PHENERGAN) IVPB, sodium chloride 0.9%    Review of Systems   Constitutional: Positive for activity change. Negative for chills, fatigue and fever.   Respiratory: Negative for shortness of breath and wheezing.    Cardiovascular: Positive for leg swelling. Negative for chest pain.   Gastrointestinal: Positive for abdominal distention. Negative for abdominal pain, constipation, diarrhea, nausea and vomiting.   Genitourinary: Negative for decreased urine volume, difficulty urinating and dysuria.   Musculoskeletal: Negative for arthralgias and back pain.   Allergic/Immunologic: Positive for immunocompromised state.   Neurological: Negative for tremors.   Psychiatric/Behavioral: Negative for confusion.     Objective:     Vital Signs (Most Recent):  Temp: 97.5 °F (36.4 °C) (10/10/18  1143)  Pulse: 107 (10/10/18 1108)  Resp: 12 (10/10/18 0850)  BP: 122/73 (10/10/18 0837)  SpO2: 100 % (10/10/18 0850) Vital Signs (24h Range):  Temp:  [97.5 °F (36.4 °C)-98 °F (36.7 °C)] 97.5 °F (36.4 °C)  Pulse:  [] 107  Resp:  [11-18] 12  SpO2:  [97 %-100 %] 100 %  BP: (109-136)/(62-86) 122/73     Weight: 66 kg (145 lb 8 oz)  Body mass index is 25.77 kg/m².    Intake/Output - Last 3 Shifts       10/08 0700 - 10/09 0659 10/09 0700 - 10/10 0659 10/10 0700 - 10/11 0659    P.O.  1160     I.V. (mL/kg) 2944.4 (43.5) 1190.9 (18)     Blood       IV Piggyback 300 1100     Total Intake(mL/kg) 3244.4 (47.9) 3450.9 (52.3)     Urine (mL/kg/hr) 305 (0.2) 500 (0.3)     Emesis/NG output 0 0     Drains 554 310     Stool 0 0     Total Output 859 810     Net +2385.4 +2640.9            Urine Occurrence 2 x 2 x     Stool Occurrence 2 x 1 x     Emesis Occurrence 0 x 0 x           Physical Exam   Constitutional: She is oriented to person, place, and time.   Temporal and distal extremity muscle wasting   Eyes: EOM are normal. Pupils are equal, round, and reactive to light. Scleral icterus is present.   Neck: Normal range of motion.   Cardiovascular: Normal rate, regular rhythm and normal heart sounds.   No murmur heard.  Pulmonary/Chest: Effort normal and breath sounds normal. She has no wheezes.   Abdominal: Soft. Bowel sounds are normal. She exhibits distension. There is no tenderness.   Chevron incision w/ staples, CDI  RLQ VALORIE x 1 (medial)- SS drainage    Musculoskeletal: Normal range of motion. She exhibits edema (BL lower extremities).   Neurological: She is alert and oriented to person, place, and time.   Skin: Skin is warm and dry.   Nursing note and vitals reviewed.      Laboratory:  Immunosuppressants         Stop Route Frequency     mycophenolate capsule 1,000 mg      -- Oral 2 times daily        CBC:   Recent Labs   Lab  10/10/18   0600   WBC  4.32   RBC  2.95*   HGB  8.7*   HCT  26.1*   PLT  58*   MCV  89   MCH  29.5    MCHC  33.3     CMP:   Recent Labs   Lab  10/10/18   0600   GLU  208*   CALCIUM  8.4*   ALBUMIN  3.1*   PROT  4.2*   NA  134*   K  3.6   CO2  17*   CL  100   BUN  79*   CREATININE  1.8*   ALKPHOS  145*   ALT  157*   AST  81*   BILITOT  14.0*     Labs within the past 24 hours have been reviewed.    Diagnostic Results:  I have personally reviewed all pertinent imaging studies.

## 2018-10-10 NOTE — PT/OT/SLP PROGRESS
Physical Therapy Treatment    Patient Name:  Scarlett Reddy   MRN:  [id number]    Recommendations:     Discharge Recommendations:  home health PT   Discharge Equipment Recommendations: walker, rolling   Barriers to discharge: None    Assessment:     Scarlett Reddy is a 68 y.o. female admitted with a medical diagnosis of Liver transplanted.  She presents with the following impairments/functional limitations:  weakness, gait instability, impaired endurance, impaired balance, decreased safety awareness, impaired self care skills, impaired functional mobilty, edema. Pt progressing functional mobility, as she was able to increase ambulation distance this date. Demo'd mild gait instability, requiring RW and CGA for improved safety and stability. Further independence and progression of mobility limited by impaired endurance, generalized weakness, LE edema, and decreased balance. Pt would continue to benefit from skilled acute PT in order to address current deficits and progress functional mobility.     Rehab Prognosis:  good; patient would benefit from acute skilled PT services to address these deficits and reach maximum level of function.      Recent Surgery: Procedure(s) (LRB):  LAPAROTOMY, EXPLORATORY, AFTER LIVER TRANSPLANT (N/A) 4 Days Post-Op    Plan:     During this hospitalization, patient to be seen 4 x/week to address the above listed problems via gait training, therapeutic activities, therapeutic exercises, neuromuscular re-education  · Plan of Care Expires:  11/05/18   Plan of Care Reviewed with: patient, caregiver    Subjective     Communicated with RN prior to session.  Patient found UIC upon PT entry to room, agreeable to treatment.     present throughout session to translate.     Chief Complaint: OLAF hose being too tight overnight   Pain/Comfort:  · Pain Rating 1: 0/10    Patients cultural, spiritual, Tenriism conflicts given the current situation: none noted     Objective:     Patient found with:  telemetry, pulse ox (continuous), central line, VALORIE drain     General Precautions: Standard, fall, contact   Orthopedic Precautions:N/A   Braces: N/A     Functional Mobility:  · Transfers:     · Sit to Stand:  contact guard assistance with rolling walker  · Cues for transfer technique with RW  · Gait: ~250 ft. with CGA and RW  · Mild gait instability, impaired weight-shifting ability, decreased toe-floor clearance, decreased step length, decreased giuseppe  · Standing rest break x1      AM-PAC 6 CLICK MOBILITY  Turning over in bed (including adjusting bedclothes, sheets and blankets)?: 3  Sitting down on and standing up from a chair with arms (e.g., wheelchair, bedside commode, etc.): 3  Moving from lying on back to sitting on the side of the bed?: 3  Moving to and from a bed to a chair (including a wheelchair)?: 3  Need to walk in hospital room?: 3  Climbing 3-5 steps with a railing?: 2  Basic Mobility Total Score: 17       Therapeutic Activities and Exercises:   present throughout session to translate.    telling therapist that pt and family reporting OLAF hose were too tight overnight so pt's family wrapped BLE in bandages/wraps. RN notified, as she was not previously aware. Discussed benefits of compression, as wraps provided by family offered no compression.   Discussed DME needs and RW use. Pt and family v/u.  RW ordered for use during hospital admission.     Patient left up in chair with all lines intact, call button in reach, RN notified and pt's  and  present..    GOALS:   Multidisciplinary Problems     Physical Therapy Goals        Problem: Physical Therapy Goal    Goal Priority Disciplines Outcome Goal Variances Interventions   Physical Therapy Goal     PT, PT/OT Ongoing (interventions implemented as appropriate)     Description:  Goals to be met by: 10/22/2018    Patient will increase functional independence with mobility by performin. Supine to sit with  Contact Guard Assistance - not met  2. Sit to stand transfer with Supervision using LRAD or no AD - not met  3. Bed to chair transfer with Supervision using LRAD or no AD - not met  4. Gait  x 250 feet with Supervision using LRAD or no AD - not met  5. Stand for 3 minutes with Supervision to increase activity tolerance - not met   6. Lower extremity exercise program x15 reps per handout, with independence - not met                       Time Tracking:     PT Received On: 10/10/18  PT Start Time: 1105     PT Stop Time: 1128  PT Total Time (min): 23 min     Billable Minutes: Gait Training 15 and Therapeutic Activity 8    Treatment Type: Treatment  PT/PTA: PT     PTA Visit Number: 0     Radha Nguyen PT, DPT   10/10/2018  [phone]

## 2018-10-10 NOTE — PLAN OF CARE
AAO x 4. VSS, afebrile, SpO2>95% on RA. Telemetry monitoring SR.  BG monitoring AC/HS/2. Transitional insulin gtt turned off.  LR @ 50 mL/hr.  Chevron incision JULIANE with staples. R VALORIE x 2 with serosanguinous drainage and clots present to bulb.  IV vanc and Zosyn continued.   Self meds set up.  Fall precautions maintained and pt repositions self. See flowsheet for assessment findings. Will continue to monitor.

## 2018-10-10 NOTE — PLAN OF CARE
Problem: Patient Care Overview  Goal: Plan of Care Review  Outcome: Ongoing (interventions implemented as appropriate)  VSS. Continuous LR d/c. Insulin gtt restarted @ 1.6 units/hr. 2nd VALORIE drain removed. Liver U/S scheduled for tomorrow. Pt was educated on her medications using the . No issues today. Will continue to monitor pt.

## 2018-10-10 NOTE — ASSESSMENT & PLAN NOTE
-Valcyte for CMV prophylaxis --> will start at discharge or  POD #10 in hospital  - Bactrim for PCP prophylaxis   -Fluconazole for fungal prophylaxis

## 2018-10-10 NOTE — PROGRESS NOTES
" [facility]  Adult Nutrition  Progress Note     SUMMARY       Recommendations  Recommendation/Intervention:   1.Encourage increased PO intake and OS intake as tolerated.   2. Dietitian Following    Goals:   1. Patient to meet >85% EEN/EPN.   2. Promote nutrition related labs WNL  Nutrition Goal Status: progressing towards goal  Communication of RD Recs: (POC)    General Information Comments: Pt s/p OLTx 10/5. Language barrier.  present. Tolerating diabetic diet, patient excited about food options, 75% intake. Continues to consume Boost Plus. Denies N/V. Post transplant education provided.  NFPE completed 10/3/18.    Nutrition Discharge Planning: Post transplant nutrition education provided. Food safety/drug interactions emphasized. General healthy diet recommended. Dietitian name/contact information, education material left.  No other needs identified. Caregiver present.     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, DM  Interdisciplinary Rounds: attended    Nutrition Risk Screen  Nutrition Risk Screen: cultural or Confucianist food preferences    Nutrition/Diet History  Patient Reported Diet/Restrictions/Preferences: diabetic diet, low salt(Cultural preferences)  Food Preferences: Cultural preferences Noted  Do you have any cultural, spiritual, Confucianist conflicts, given your current situation?: none noted   Supplemental Drinks or Food Habits: Boost Plus, Ensure Plus(Strawberry)  Food Allergies: NKFA  Factors Affecting Nutritional Intake: None identified at this time    Anthropometrics  Temp: 97.5 °F (36.4 °C)  Height Method: Stated  Height: 5' 3" (160 cm)  Height (inches): 63 in  Weight Method: Bed Scale  Weight: 66 kg (145 lb 8 oz)  Weight (lb): 145.5 lb  Ideal Body Weight (IBW), Female: 115 lb  % Ideal Body Weight, Female (lb): " 101.8 lb  BMI (Calculated): 20.8  BMI Grade: 18.5-24.9 - normal  Weight Loss: unintentional  Weight Loss Since Admission: 16 lb 0.4 oz     Lab/Procedures/Meds  Labs: Reviewed    (L) 10/10/2018    K 3.6 10/10/2018    BUN 79 (H) 10/10/2018    CREATININE 1.8 (H) 10/10/2018    CALCIUM 8.4 (L) 10/10/2018    PHOS 5.1 (H) 10/10/2018    MG 2.0 10/10/2018    EGFRNONAA 28.3 (A) 10/10/2018    ALBUMIN 3.1 (L) 10/10/2018       (H) 10/10/2018    AST 81 (H) 10/10/2018    ALKPHOS 145 (H) 10/10/2018      HGBA1C 6.0 (H) 10/04/2018     Meds: Reviewed  Scheduled Meds:   entecavir  0.5 mg Oral Q48H    ergocalciferol  50,000 Units Oral Q7 Days    famotidine  20 mg Oral QHS    fluconazole  200 mg Oral Daily    heparin (porcine)  5,000 Units Subcutaneous Q8H    hepatitis B immune globulin (HEPA-RALPH B) IVPB  9,999.912 Units Intravenous Daily    insulin aspart U-100  3-5 Units Subcutaneous TIDWM    lidocaine HCL 10 mg/ml (1%)  10 mL Other Once    midodrine  15 mg Oral TID    mirtazapine  7.5 mg Oral QHS    mycophenolate  1,000 mg Oral BID    polyethylene glycol  17 g Oral Daily    predniSONE  20 mg Oral BID     Physical Findings/Assessment  Overall Physical Appearance: loss of subcutaneous fat, loss of muscle mass  Oral/Mouth Cavity: tooth/teeth missing  Skin: incision(s)    Estimated/Assessed Needs  Weight Used For Calorie Calculations: 66 kg (145 lb 8.1 oz)  Energy Calorie Requirements (kcal): 2948-4427(25-30 kcal/kg)  Energy Need Method: Kcal/kg  Protein Requirements: 79-99(1.2-1.5 gm/kg)  Weight Used For Protein Calculations: 66 kg (145 lb 8.1 oz)  Fluid Requirements (mL): 1 mL/kcal or per MD  Fluid Need Method: RDA Method  RDA Method (mL): 1650     Nutrition Prescription Ordered  Current Diet Order: Diabetic  Nutrition Order Comments: -  Oral Nutrition Supplement: Boost Plus strawberry    Evaluation of Received Nutrient/Fluid Intake in last 24h  I/O: +14.1L since admit, +2.6L x 24 hrs  Comments: LBM 10/10/18  %  Intake of Estimated Energy Needs: 50 - 75 %  % Meal Intake: 50 - 75 %    Nutrition Risk  Level of Risk/Frequency of Follow-up: low(1x week)     Assessment and Plan  NFPE completed 10/3/18  Severe malnutrition     Nutrition Problem  Severe Malnutrition in the context of Chronic Illness/Injury     Related to (etiology):  ESLD     Signs and Symptoms (as evidenced by):  Energy Intake: <75% of estimated energy requirement for 2 months  Body Fat Depletion: Severe depletion of orbitals and triceps   Muscle Mass Depletion: Severe depletion of temples, clavicle region and lower extremities   Weight Loss: 12.31% x 1 month     Nutrition Diagnosis Status:  Continues     Monitor and Evaluation  Food and Nutrient Intake: energy intake, food and beverage intake  Food and Nutrient Adminstration: diet order  Knowledge/Beliefs/Attitudes: food and nutrition knowledge/skill  Physical Activity and Function: nutrition-related ADLs and IADLs  Anthropometric Measurements: weight change, weight  Biochemical Data, Medical Tests and Procedures: electrolyte and renal panel, gastrointestinal profile, glucose/endocrine profile, inflammatory profile, lipid profile  Nutrition-Focused Physical Findings: overall appearance     Nutrition Follow-Up  RD Follow-up?: Yes

## 2018-10-10 NOTE — SUBJECTIVE & OBJECTIVE
"Interval HPI:   Overnight events: remains in TSU. BG variable overnight on insulin infusion at 2 u/hr; correction scale given as ordered. BG trending down and insulin infusion suspended per protocol. Creatinine elevated at 1.8. Solumedrol 20 mg BID; standard steroid taper.  at bedside; interpretor Dunia called to review plan of care.   Eatin% -75%  Nausea: No  Hypoglycemia and intervention: No  Fever: No  TPN and/or TF: No    /62   Pulse 86   Temp 97.5 °F (36.4 °C) (Oral)   Resp 11   Ht 5' 3" (1.6 m)   Wt 66 kg (145 lb 8 oz)   LMP  (LMP Unknown)   SpO2 98%   Breastfeeding? No   BMI 25.77 kg/m²     Labs Reviewed and Include    Recent Labs   Lab  10/10/18   0600   GLU  208*   CALCIUM  8.4*   ALBUMIN  3.1*   PROT  4.2*   NA  134*   K  3.6   CO2  17*   CL  100   BUN  79*   CREATININE  1.8*   ALKPHOS  145*   ALT  157*   AST  81*   BILITOT  14.0*     Lab Results   Component Value Date    WBC 4.32 10/10/2018    HGB 8.7 (L) 10/10/2018    HCT 26.1 (L) 10/10/2018    MCV 89 10/10/2018    PLT 58 (L) 10/10/2018     No results for input(s): TSH, FREET4 in the last 168 hours.  Lab Results   Component Value Date    HGBA1C 6.0 (H) 10/04/2018       Nutritional status:   Body mass index is 25.77 kg/m².  Lab Results   Component Value Date    ALBUMIN 3.1 (L) 10/10/2018    ALBUMIN 2.5 (L) 10/09/2018    ALBUMIN 2.4 (L) 10/08/2018     Lab Results   Component Value Date    PREALBUMIN 12 (L) 2018       Estimated Creatinine Clearance: 26.9 mL/min (A) (based on SCr of 1.8 mg/dL (H)).    Accu-Checks  Recent Labs      10/08/18   1535  10/08/18   1809  10/08/18   2129  10/09/18   0204  10/09/18   0813  10/09/18   1402  10/09/18   2118  10/10/18   0202  10/10/18   0237  10/10/18   0814   POCTGLUCOSE  358*  260*  236*  209*  149*  243*  202*  84  91  230*       Current Medications and/or Treatments Impacting Glycemic Control  Immunotherapy:    Immunosuppressants         Stop Route Frequency     mycophenolate " capsule 1,000 mg      -- Oral 2 times daily        Steroids:   Hormones (From admission, onward)    Start     Stop Route Frequency Ordered    10/11/18 0900  predniSONE tablet 20 mg      10/18 0859 Oral Daily 10/10/18 0829    10/10/18 0930  predniSONE tablet 20 mg      10/11 0859 Oral 2 times daily 10/10/18 0829        Pressors:    Autonomic Drugs (From admission, onward)    Start     Stop Route Frequency Ordered    10/08/18 1500  midodrine tablet 10 mg      -- Oral 3 times daily 10/08/18 1008    10/06/18 1617  rocuronium (ZEMURON) 10 mg/mL injection     Comments:  Created by cabinet override    10/07 0429   10/06/18 1617        Hyperglycemia/Diabetes Medications:   Antihyperglycemics (From admission, onward)    Start     Stop Route Frequency Ordered    10/09/18 1645  insulin aspart U-100 pen 3-5 Units      -- SubQ 3 times daily with meals 10/09/18 1417    10/09/18 1535  insulin aspart U-100 pen 2 Units      -- SubQ 3 times daily PRN 10/09/18 1436    10/07/18 1200  insulin regular (Humulin R) 100 Units in sodium chloride 0.9% 100 mL infusion      -- IV Continuous 10/07/18 1055    10/07/18 1155  insulin aspart U-100 pen 0-4 Units      -- SubQ As needed (PRN) 10/07/18 1055

## 2018-10-10 NOTE — NURSING
Pt vomiting. KEEGAN Sanchez placed one time order for IV zofran due to patient actively vomiting. Meal time insulin not given due to patient vomiting. Correction given for . Will continue to monitor patient closely.

## 2018-10-10 NOTE — NURSING
Insulin gtt to be sent from pharmacy ASAP. Per Aviva, NP okay to give patient sliding scale and meal time right now and then restart gtt once arrives on unit. Will monitor for arrival on unit.

## 2018-10-10 NOTE — ASSESSMENT & PLAN NOTE
- Na 119 on admit.  -Na improving (134)  -Diuresed w/ albumin and lasix  -Receiving another dose Albumin 25% today

## 2018-10-10 NOTE — ASSESSMENT & PLAN NOTE
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   -POD 1 US: increased arterial resistive indices, suggestive of edema vs cute rejection vs congestion  -Repeat US 10/9 with continued elevation of RIs  -Plan to repeat US in the morning  -LFTs trending down  -T bili elevated-- on bhargavi

## 2018-10-10 NOTE — PROGRESS NOTES
[facility]  Liver Transplant  Progress Note    Patient Name: Scarlett Reddy  MRN: [id number]  Admission Date: 10/1/2018  Hospital Length of Stay: 8 days  Code Status: Full Code  Primary Care Provider: Primary Doctor No  Post-Operative Day: 5    ORGAN:   LIVER  Disease Etiology: Cirrhosis: Type B and D  Donor Type:    - Brain Death  CDC High Risk:   No  Donor CMV Status:   Donor CMV Status: Positive  Donor HBcAB:   Negative  Donor HCV Status:   Negative  Donor HBV SETH: Negative  Donor HCV SETH: Negative  Whole or Partial: Whole Liver  Biliary Anastomosis: End to End  Arterial Anatomy: Replaced Left Hepatic and Right Hepatic  Subjective:     History of Present Illness:  Ms. Reddy is a 69 y/o female with PMH of ESLD secondary Hep B and D.  Listed for liver transplant with MELD 23.  Paracentesis 10/1 with 5L removed, no fluid sent for analysis.  Morning labs significant for hyponatremia, Na 119.  Pt admitted to LTS for sodium monitoring.        Hospital Course:  Hospital Course: 69 y/o F h/o HBV/delta cirrhosis s/p DBDLT 10/5/18 (CMV D+/R+, steroid induction, MMF/tacro/pred maintenance) with take back on 10/6 2/2 hyperbilirubinema, no leak identified. Post-op course c/b hypercapneic and hypoxic respiratory failure and was reintubated though quickly extubated now doing well. Transferred from ICU on POD #4. ID consulted to comment on positive diphtheroid culture from ascitic fluid (likely skin colonization) as well as ESBL Klebsiella pneumoniae in urine culture (10/4).  Pt asymptomatic and cell count from ascitic fluid unremarkable. Per ID recs, no need to treat diphtheroids or asymptomatic ESBL Kleb pneumo bacteriuria though pt has had 6 days of therapy which would cover these organisms. Mild peritransplant ascites- repeat US 10/6 with increased arterial resistive indices, suggestive of edema vs acute rejection vs congestion.     Interval history: No acute events noted. LFTs cont to trend down. T  bili still elevated (14.0), likely 2/2 cholestasis. Receiving ursodiol. Hyponatremia improving (Na 134). Pt w/ lower extremity edema- a dose of Albumin 25% given. IVF dced. POD #4 US w/ continued elevation in RIs and multiple peritransplant fluid collections. Will repeat US in the morning. Lateral VALORIE drain removed yesterday, will remove medial VALORIE today. Pain controlled. +Bm. VSS. No further complaints at this time.     Scheduled Meds:   entecavir  0.5 mg Oral Q48H    ergocalciferol  50,000 Units Oral Q7 Days    famotidine  20 mg Oral QHS    fluconazole  200 mg Oral Daily    heparin (porcine)  5,000 Units Subcutaneous Q8H    hepatitis B immune globulin (HEPA-RALPH B) IVPB  9,999.912 Units Intravenous Daily    insulin aspart U-100  3-5 Units Subcutaneous TIDWM    lidocaine HCL 10 mg/ml (1%)  10 mL Other Once    midodrine  15 mg Oral TID    mirtazapine  7.5 mg Oral QHS    mycophenolate  1,000 mg Oral BID    polyethylene glycol  17 g Oral Daily    predniSONE  20 mg Oral BID    Followed by    [START ON 10/11/2018] predniSONE  20 mg Oral Daily    sodium bicarbonate  1,300 mg Oral QID    sulfamethoxazole-trimethoprim 400-80mg  1 tablet Oral Every Mon, Wed, Fri    ursodiol  300 mg Oral BID    [START ON 10/15/2018] valganciclovir 50 mg/ml  450 mg Per NG tube Daily     Continuous Infusions:   insulin (HUMAN R) infusion (adults) 1.6 Units/hr (10/10/18 1019)     PRN Meds:bisacodyl, dextrose 50%, dextrose 50%, glucagon (human recombinant), glucose, glucose, insulin aspart U-100, insulin aspart U-100, ondansetron, oxyCODONE, oxyCODONE, promethazine (PHENERGAN) IVPB, sodium chloride 0.9%    Review of Systems   Constitutional: Positive for activity change. Negative for chills, fatigue and fever.   Respiratory: Negative for shortness of breath and wheezing.    Cardiovascular: Positive for leg swelling. Negative for chest pain.   Gastrointestinal: Positive for abdominal distention. Negative for abdominal pain,  constipation, diarrhea, nausea and vomiting.   Genitourinary: Negative for decreased urine volume, difficulty urinating and dysuria.   Musculoskeletal: Negative for arthralgias and back pain.   Allergic/Immunologic: Positive for immunocompromised state.   Neurological: Negative for tremors.   Psychiatric/Behavioral: Negative for confusion.     Objective:     Vital Signs (Most Recent):  Temp: 97.5 °F (36.4 °C) (10/10/18 1143)  Pulse: 107 (10/10/18 1108)  Resp: 12 (10/10/18 0850)  BP: 122/73 (10/10/18 0837)  SpO2: 100 % (10/10/18 0850) Vital Signs (24h Range):  Temp:  [97.5 °F (36.4 °C)-98 °F (36.7 °C)] 97.5 °F (36.4 °C)  Pulse:  [] 107  Resp:  [11-18] 12  SpO2:  [97 %-100 %] 100 %  BP: (109-136)/(62-86) 122/73     Weight: 66 kg (145 lb 8 oz)  Body mass index is 25.77 kg/m².    Intake/Output - Last 3 Shifts       10/08 0700 - 10/09 0659 10/09 0700 - 10/10 0659 10/10 0700 - 10/11 0659    P.O.  1160     I.V. (mL/kg) 2944.4 (43.5) 1190.9 (18)     Blood       IV Piggyback 300 1100     Total Intake(mL/kg) 3244.4 (47.9) 3450.9 (52.3)     Urine (mL/kg/hr) 305 (0.2) 500 (0.3)     Emesis/NG output 0 0     Drains 554 310     Stool 0 0     Total Output 859 810     Net +2385.4 +2640.9            Urine Occurrence 2 x 2 x     Stool Occurrence 2 x 1 x     Emesis Occurrence 0 x 0 x           Physical Exam   Constitutional: She is oriented to person, place, and time.   Temporal and distal extremity muscle wasting   Eyes: EOM are normal. Pupils are equal, round, and reactive to light. Scleral icterus is present.   Neck: Normal range of motion.   Cardiovascular: Normal rate, regular rhythm and normal heart sounds.   No murmur heard.  Pulmonary/Chest: Effort normal and breath sounds normal. She has no wheezes.   Abdominal: Soft. Bowel sounds are normal. She exhibits distension. There is no tenderness.   Chevron incision w/ staples, CDI  RLQ VALORIE x 1 (medial)- SS drainage    Musculoskeletal: Normal range of motion. She exhibits edema  (BL lower extremities).   Neurological: She is alert and oriented to person, place, and time.   Skin: Skin is warm and dry.   Nursing note and vitals reviewed.      Laboratory:  Immunosuppressants         Stop Route Frequency     mycophenolate capsule 1,000 mg      -- Oral 2 times daily        CBC:   Recent Labs   Lab  10/10/18   0600   WBC  4.32   RBC  2.95*   HGB  8.7*   HCT  26.1*   PLT  58*   MCV  89   MCH  29.5   MCHC  33.3     CMP:   Recent Labs   Lab  10/10/18   0600   GLU  208*   CALCIUM  8.4*   ALBUMIN  3.1*   PROT  4.2*   NA  134*   K  3.6   CO2  17*   CL  100   BUN  79*   CREATININE  1.8*   ALKPHOS  145*   ALT  157*   AST  81*   BILITOT  14.0*     Labs within the past 24 hours have been reviewed.    Diagnostic Results:  I have personally reviewed all pertinent imaging studies.    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   -POD 1 US: increased arterial resistive indices, suggestive of edema vs cute rejection vs congestion  -Repeat US 10/9 with continued elevation of RIs  -Plan to repeat US in the morning  -LFTs trending down  -T bili elevated-- on bhargavi           Metabolic acidosis    Started Na bicarb 1300 QID          At risk for opportunistic infections    -Valcyte for CMV prophylaxis --> will start at discharge or  POD #10 in hospital  - Bactrim for PCP prophylaxis   -Fluconazole for fungal prophylaxis           Long-term use of immunosuppressant medication    Cellcept/Prograf/pred  Held prograf due to elevated levels -- trending down  Prograf 0.5mg x 1 given today  Will monitor for signs of toxic side effects, check daily tacrolimus troughs, and change meds accordingly.          Prophylactic immunotherapy    See long term use immunosuppresion          Ascites    - paracentesis 10/1, 5L removed, no fluid sent for analysis.  -  diuresing with albumin and lasix 10/9           Physical deconditioning    -PT/OT consulted.  -Recommend home health PT and rolling walker at discharge           Hyponatremia    - Na 119 on admit.  -Na improving (134)  -Diuresed w/ albumin and lasix  -Receiving another dose Albumin 25% today               Hypotension    - continue Midodrine 15 mg TID.           Severe malnutrition    - dietary consulted.   -temporal and distal extremity muscle wasting and edema  -supplements ordered          Type 2 diabetes mellitus with diabetic polyneuropathy, with long-term current use of insulin    - continue home regimen.  - endocrine consulted.  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      Jihan Soares PA-C  Liver Transplant  [facility]

## 2018-10-10 NOTE — ASSESSMENT & PLAN NOTE
BG goal 140 - 180     Restart transition IV insulin drip at  1.6  u/hr  Continue Novolog 3-5 units with meals   2 units PRN protein shake  Low dose correction scale insulin  BG monitoring //0200      Discharge planning:  [place]

## 2018-10-11 ENCOUNTER — CONFERENCE (OUTPATIENT)
Dept: TRANSPLANT | Facility: CLINIC | Age: 68
End: 2018-10-11

## 2018-10-11 PROBLEM — N17.9 AKI (ACUTE KIDNEY INJURY): Status: ACTIVE | Noted: 2018-10-11

## 2018-10-11 PROBLEM — N17.9 ACUTE RENAL FAILURE: Status: ACTIVE | Noted: 2018-10-11

## 2018-10-11 LAB
ALBUMIN SERPL BCP-MCNC: 2.9 G/DL
ALP SERPL-CCNC: 139 U/L
ALT SERPL W/O P-5'-P-CCNC: 110 U/L
ANION GAP SERPL CALC-SCNC: 13 MMOL/L
APTT BLDCRRT: 32.3 SEC
AST SERPL-CCNC: 48 U/L
BACTERIA UR CULT: NO GROWTH
BASOPHILS # BLD AUTO: 0 K/UL
BASOPHILS NFR BLD: 0 %
BILIRUB SERPL-MCNC: 10.5 MG/DL
BUN SERPL-MCNC: 91 MG/DL
CALCIUM SERPL-MCNC: 8.5 MG/DL
CHLORIDE SERPL-SCNC: 100 MMOL/L
CHLORIDE UR-SCNC: <20 MMOL/L
CO2 SERPL-SCNC: 20 MMOL/L
CREAT SERPL-MCNC: 1.9 MG/DL
CREAT UR-MCNC: 66 MG/DL
CREAT UR-MCNC: 66 MG/DL
DIFFERENTIAL METHOD: ABNORMAL
EOSINOPHIL # BLD AUTO: 0 K/UL
EOSINOPHIL NFR BLD: 0 %
ERYTHROCYTE [DISTWIDTH] IN BLOOD BY AUTOMATED COUNT: 16.3 %
EST. GFR  (AFRICAN AMERICAN): 30.6 ML/MIN/1.73 M^2
EST. GFR  (NON AFRICAN AMERICAN): 26.5 ML/MIN/1.73 M^2
GLUCOSE SERPL-MCNC: 212 MG/DL
HCT VFR BLD AUTO: 24 %
HGB BLD-MCNC: 8.5 G/DL
IMM GRANULOCYTES # BLD AUTO: 0.04 K/UL
IMM GRANULOCYTES NFR BLD AUTO: 0.6 %
INR PPP: 1.2
LYMPHOCYTES # BLD AUTO: 0.1 K/UL
LYMPHOCYTES NFR BLD: 2.3 %
MAGNESIUM SERPL-MCNC: 1.8 MG/DL
MCH RBC QN AUTO: 30.5 PG
MCHC RBC AUTO-ENTMCNC: 35.4 G/DL
MCV RBC AUTO: 86 FL
MONOCYTES # BLD AUTO: 0.4 K/UL
MONOCYTES NFR BLD: 5.8 %
NEUTROPHILS # BLD AUTO: 5.7 K/UL
NEUTROPHILS NFR BLD: 91.3 %
NRBC BLD-RTO: 0 /100 WBC
OSMOLALITY UR: 432 MOSM/KG
PHOSPHATE SERPL-MCNC: 4.8 MG/DL
PLATELET # BLD AUTO: 55 K/UL
PMV BLD AUTO: 10.3 FL
POCT GLUCOSE: 179 MG/DL (ref 70–110)
POCT GLUCOSE: 220 MG/DL (ref 70–110)
POCT GLUCOSE: 226 MG/DL (ref 70–110)
POCT GLUCOSE: 235 MG/DL (ref 70–110)
POCT GLUCOSE: 251 MG/DL (ref 70–110)
POTASSIUM SERPL-SCNC: 3.6 MMOL/L
POTASSIUM UR-SCNC: 42 MMOL/L
PROT SERPL-MCNC: 4 G/DL
PROT UR-MCNC: 54 MG/DL
PROT/CREAT UR: 0.82 MG/G{CREAT}
PROTHROMBIN TIME: 12.3 SEC
RBC # BLD AUTO: 2.79 M/UL
SODIUM SERPL-SCNC: 133 MMOL/L
SODIUM UR-SCNC: <20 MMOL/L
TACROLIMUS BLD-MCNC: 6.8 NG/ML
UUN UR-MCNC: 771 MG/DL
WBC # BLD AUTO: 6.19 K/UL

## 2018-10-11 PROCEDURE — 63600175 PHARM REV CODE 636 W HCPCS: Performed by: SURGERY

## 2018-10-11 PROCEDURE — 85025 COMPLETE CBC W/AUTO DIFF WBC: CPT

## 2018-10-11 PROCEDURE — 84156 ASSAY OF PROTEIN URINE: CPT

## 2018-10-11 PROCEDURE — 25000003 PHARM REV CODE 250: Performed by: NURSE PRACTITIONER

## 2018-10-11 PROCEDURE — 83735 ASSAY OF MAGNESIUM: CPT

## 2018-10-11 PROCEDURE — 85610 PROTHROMBIN TIME: CPT

## 2018-10-11 PROCEDURE — 84100 ASSAY OF PHOSPHORUS: CPT

## 2018-10-11 PROCEDURE — 20600001 HC STEP DOWN PRIVATE ROOM

## 2018-10-11 PROCEDURE — 99232 SBSQ HOSP IP/OBS MODERATE 35: CPT | Mod: ,,, | Performed by: NURSE PRACTITIONER

## 2018-10-11 PROCEDURE — 94664 DEMO&/EVAL PT USE INHALER: CPT

## 2018-10-11 PROCEDURE — 25000003 PHARM REV CODE 250: Performed by: STUDENT IN AN ORGANIZED HEALTH CARE EDUCATION/TRAINING PROGRAM

## 2018-10-11 PROCEDURE — 84540 ASSAY OF URINE/UREA-N: CPT

## 2018-10-11 PROCEDURE — 99900035 HC TECH TIME PER 15 MIN (STAT)

## 2018-10-11 PROCEDURE — 82436 ASSAY OF URINE CHLORIDE: CPT

## 2018-10-11 PROCEDURE — 80053 COMPREHEN METABOLIC PANEL: CPT

## 2018-10-11 PROCEDURE — 99223 1ST HOSP IP/OBS HIGH 75: CPT | Mod: ,,, | Performed by: INTERNAL MEDICINE

## 2018-10-11 PROCEDURE — 63600175 PHARM REV CODE 636 W HCPCS: Performed by: NURSE PRACTITIONER

## 2018-10-11 PROCEDURE — 83935 ASSAY OF URINE OSMOLALITY: CPT

## 2018-10-11 PROCEDURE — 25000003 PHARM REV CODE 250: Performed by: PHYSICIAN ASSISTANT

## 2018-10-11 PROCEDURE — 84133 ASSAY OF URINE POTASSIUM: CPT

## 2018-10-11 PROCEDURE — 80197 ASSAY OF TACROLIMUS: CPT

## 2018-10-11 PROCEDURE — 63600175 PHARM REV CODE 636 W HCPCS: Performed by: STUDENT IN AN ORGANIZED HEALTH CARE EDUCATION/TRAINING PROGRAM

## 2018-10-11 PROCEDURE — 99233 SBSQ HOSP IP/OBS HIGH 50: CPT | Mod: 24,,, | Performed by: PHYSICIAN ASSISTANT

## 2018-10-11 PROCEDURE — 85730 THROMBOPLASTIN TIME PARTIAL: CPT

## 2018-10-11 PROCEDURE — 84300 ASSAY OF URINE SODIUM: CPT

## 2018-10-11 PROCEDURE — 25000003 PHARM REV CODE 250: Performed by: TRANSPLANT SURGERY

## 2018-10-11 RX ORDER — PANTOPRAZOLE SODIUM 40 MG/1
40 TABLET, DELAYED RELEASE ORAL DAILY
Status: DISCONTINUED | OUTPATIENT
Start: 2018-10-11 | End: 2018-10-16

## 2018-10-11 RX ORDER — VALGANCICLOVIR 450 MG/1
450 TABLET, FILM COATED ORAL DAILY
Status: DISCONTINUED | OUTPATIENT
Start: 2018-10-15 | End: 2018-10-14

## 2018-10-11 RX ORDER — INSULIN ASPART 100 [IU]/ML
6 INJECTION, SOLUTION INTRAVENOUS; SUBCUTANEOUS
Status: DISCONTINUED | OUTPATIENT
Start: 2018-10-11 | End: 2018-10-12

## 2018-10-11 RX ORDER — TACROLIMUS 0.5 MG/1
0.5 CAPSULE ORAL EVERY MORNING
Status: DISCONTINUED | OUTPATIENT
Start: 2018-10-11 | End: 2018-10-16

## 2018-10-11 RX ADMIN — TACROLIMUS 0.5 MG: 0.5 CAPSULE ORAL at 05:10

## 2018-10-11 RX ADMIN — INSULIN ASPART 2 UNITS: 100 INJECTION, SOLUTION INTRAVENOUS; SUBCUTANEOUS at 02:10

## 2018-10-11 RX ADMIN — HEPARIN SODIUM 5000 UNITS: 5000 INJECTION, SOLUTION INTRAVENOUS; SUBCUTANEOUS at 05:10

## 2018-10-11 RX ADMIN — INSULIN ASPART 1 UNITS: 100 INJECTION, SOLUTION INTRAVENOUS; SUBCUTANEOUS at 06:10

## 2018-10-11 RX ADMIN — SODIUM CHLORIDE 1.8 UNITS/HR: 9 INJECTION, SOLUTION INTRAVENOUS at 10:10

## 2018-10-11 RX ADMIN — MYCOPHENOLATE MOFETIL 1000 MG: 250 CAPSULE ORAL at 09:10

## 2018-10-11 RX ADMIN — URSODIOL 300 MG: 300 CAPSULE ORAL at 09:10

## 2018-10-11 RX ADMIN — INSULIN ASPART 1 UNITS: 100 INJECTION, SOLUTION INTRAVENOUS; SUBCUTANEOUS at 09:10

## 2018-10-11 RX ADMIN — INSULIN ASPART 3 UNITS: 100 INJECTION, SOLUTION INTRAVENOUS; SUBCUTANEOUS at 09:10

## 2018-10-11 RX ADMIN — SODIUM BICARBONATE 650 MG TABLET 1300 MG: at 01:10

## 2018-10-11 RX ADMIN — FLUCONAZOLE 200 MG: 200 TABLET ORAL at 08:10

## 2018-10-11 RX ADMIN — SODIUM BICARBONATE 650 MG TABLET 1300 MG: at 08:10

## 2018-10-11 RX ADMIN — MIDODRINE HYDROCHLORIDE 15 MG: 5 TABLET ORAL at 08:10

## 2018-10-11 RX ADMIN — MIDODRINE HYDROCHLORIDE 15 MG: 5 TABLET ORAL at 09:10

## 2018-10-11 RX ADMIN — HEPATITIS B IMMUNE GLOBULIN (HUMAN) 9999.91 UNITS: 312 INJECTION, SOLUTION INTRAMUSCULAR; INTRAVENOUS at 09:10

## 2018-10-11 RX ADMIN — URSODIOL 300 MG: 300 CAPSULE ORAL at 08:10

## 2018-10-11 RX ADMIN — PANTOPRAZOLE SODIUM 40 MG: 40 TABLET, DELAYED RELEASE ORAL at 11:10

## 2018-10-11 RX ADMIN — MIDODRINE HYDROCHLORIDE 15 MG: 5 TABLET ORAL at 05:10

## 2018-10-11 RX ADMIN — HEPARIN SODIUM 5000 UNITS: 5000 INJECTION, SOLUTION INTRAVENOUS; SUBCUTANEOUS at 09:10

## 2018-10-11 RX ADMIN — SODIUM BICARBONATE 650 MG TABLET 1300 MG: at 05:10

## 2018-10-11 RX ADMIN — PREDNISONE 20 MG: 10 TABLET ORAL at 08:10

## 2018-10-11 RX ADMIN — MYCOPHENOLATE MOFETIL 1000 MG: 250 CAPSULE ORAL at 08:10

## 2018-10-11 RX ADMIN — INSULIN ASPART 5 UNITS: 100 INJECTION, SOLUTION INTRAVENOUS; SUBCUTANEOUS at 02:10

## 2018-10-11 RX ADMIN — SODIUM BICARBONATE 650 MG TABLET 1300 MG: at 09:10

## 2018-10-11 RX ADMIN — INSULIN ASPART 6 UNITS: 100 INJECTION, SOLUTION INTRAVENOUS; SUBCUTANEOUS at 06:10

## 2018-10-11 NOTE — PROGRESS NOTES
"[facility]  Endocrinology  Progress Note    Admit Date: 10/1/2018     Reason for Consult: Management of type 2 DM, Hyperglycemia      Surgical Procedure and Date: Liver transplant 10/5/18, Ex lap 10/6/18     Diabetes diagnosis year:      Home Diabetes Medications:  Lantus 18 units HS and novolog 17 units with meals plus correction scale (150-200 +1)        Lab Results   Component Value Date     HGBA1C 6.8 (H) 2018        How often checking glucose at home? 3-4   BG readings on regimen: 120-150.  Post prandial readings as high as upper 200s  Hypoglycemia on the regimen? yes, none recently   Missed doses on regimen?  No     Diabetes Complications include:     Diabetic peripheral neuropathy      Complicating diabetes co morbidities:   CIRRHOSIS        HPI:  Patient is a 68 y.o. female with a diagnosis of ESLD, listed for liver transplant with meld 23 who underwent live transplant on 10/5/18.  Back to OR on 10/6/18 for ex lap.  Admitted 10/1/18 for hyponatremia s/p paracentesis. She speaks primarily Occitan. Information obtained from recent past admission and discussion over the phone via  ([employer]). Family not at bedside. Pt request family at bedside for future conversations. Personal has known diagnosis of diabetes, endocrine consulted for diabetes management.        Interval HPI:   Overnight events: remains in TSU. BG slightly above goal overnight on insulin infusion 1.6 u/hr; did not receive correction scale. Prednisone 20 mg daily; standard steroid taper.   Eatin% - 100%   Nausea: No  Hypoglycemia and intervention: No  Fever: No  TPN and/or TF: No    /63   Pulse 84   Temp 97.6 °F (36.4 °C) (Oral)   Resp 16   Ht 5' 3" (1.6 m)   Wt 65 kg (143 lb 6.3 oz)   LMP  (LMP Unknown)   SpO2 100%   Breastfeeding? No   BMI 25.40 kg/m²      Labs Reviewed and Include    No results for input(s): GLU, CALCIUM, ALBUMIN, PROT, NA, K, CO2, CL, BUN, CREATININE, ALKPHOS, ALT, " AST, BILITOT in the last 24 hours.  Lab Results   Component Value Date    WBC 6.19 10/11/2018    HGB 8.5 (L) 10/11/2018    HCT 24.0 (L) 10/11/2018    MCV 86 10/11/2018    PLT 55 (L) 10/11/2018     No results for input(s): TSH, FREET4 in the last 168 hours.  Lab Results   Component Value Date    HGBA1C 6.0 (H) 10/04/2018       Nutritional status:   Body mass index is 25.4 kg/m².  Lab Results   Component Value Date    ALBUMIN 3.1 (L) 10/10/2018    ALBUMIN 2.5 (L) 10/09/2018    ALBUMIN 2.4 (L) 10/08/2018     Lab Results   Component Value Date    PREALBUMIN 12 (L) 09/17/2018       Estimated Creatinine Clearance: 26.7 mL/min (A) (based on SCr of 1.8 mg/dL (H)).    Accu-Checks  Recent Labs      10/09/18   0204  10/09/18   0813  10/09/18   1402  10/09/18   2118  10/10/18   0202  10/10/18   0237  10/10/18   0814  10/10/18   1334  10/10/18   1814  10/10/18   2053   POCTGLUCOSE  209*  149*  243*  202*  84  91  230*  267*  216*  216*       Current Medications and/or Treatments Impacting Glycemic Control  Immunotherapy:    Immunosuppressants         Stop Route Frequency     mycophenolate capsule 1,000 mg      -- Oral 2 times daily        Steroids:   Hormones (From admission, onward)    Start     Stop Route Frequency Ordered    10/11/18 0900  predniSONE tablet 20 mg      10/18 0859 Oral Daily 10/10/18 0829        Pressors:    Autonomic Drugs (From admission, onward)    Start     Stop Route Frequency Ordered    10/10/18 1500  midodrine tablet 15 mg      -- Oral 3 times daily 10/10/18 1122    10/06/18 1617  rocuronium (ZEMURON) 10 mg/mL injection     Comments:  Created by cabinet override    10/07 0429   10/06/18 1617        Hyperglycemia/Diabetes Medications:   Antihyperglycemics (From admission, onward)    Start     Stop Route Frequency Ordered    10/09/18 1645  insulin aspart U-100 pen 3-5 Units      -- SubQ 3 times daily with meals 10/09/18 1417    10/09/18 1535  insulin aspart U-100 pen 2 Units      -- SubQ 3 times daily PRN  10/09/18 1436    10/07/18 1200  insulin regular (Humulin R) 100 Units in sodium chloride 0.9% 100 mL infusion      -- IV Continuous 10/07/18 1055    10/07/18 1155  insulin aspart U-100 pen 0-4 Units      -- SubQ As needed (PRN) 10/07/18 1055          ASSESSMENT and PLAN    * Liver transplanted    Managed per primary team  Avoid hypoglycemia    No results for input(s): ALT, AST, ALKPHOS, BILITOT, PROT, ALBUMIN in the last 24 hours.            Type 2 diabetes mellitus with diabetic polyneuropathy, with long-term current use of insulin    BG goal 140 - 180     Increase transition IV insulin drip at  1.8  u/hr  Continue Novolog 3-5 units with meals   2 units PRN protein shake  Low dose correction scale insulin  BG monitoring /HS/0200    ADDENDUM: change to novolog 6 units with meals    Discharge planning:  TBD        Adverse effect of corticosteroids    On standard steroid taper per transplant team; may elevate BG readings            EVA (acute kidney injury)    Avoid insulin stacking and hypoglycemia.  Lab Results   Component Value Date    CREATININE 1.9 (H) 10/11/2018               Prophylactic immunotherapy    May increase insulin resistance.               Aviva Caldera NP  Endocrinology  [facility]

## 2018-10-11 NOTE — ASSESSMENT & PLAN NOTE
Cellcept/Prograf/pred  Held prograf 10/8 for elevated levels  Resumed Prograf 10/10 at dose of 0.5mg x 1.   Will monitor for signs of toxic side effects, check daily tacrolimus troughs, and change meds accordingly.

## 2018-10-11 NOTE — PT/OT/SLP PROGRESS
Occupational Therapy      Patient Name:  Scarlett Reddy   MRN:  [id number]    Occupational therapy visit attempted in PM. Patient not seen today secondary to  in room and transport arrival for ultrasound. Unable to re-attempt this date. Will follow-up as scheduled.    Laura corado OT  10/11/2018

## 2018-10-11 NOTE — SUBJECTIVE & OBJECTIVE
Scheduled Meds:   entecavir  0.5 mg Oral Q48H    ergocalciferol  50,000 Units Oral Q7 Days    fluconazole  200 mg Oral Daily    heparin (porcine)  5,000 Units Subcutaneous Q8H    hepatitis B immune globulin (HEPA-RALPH B) IVPB  9,999.912 Units Intravenous Daily    insulin aspart U-100  3-5 Units Subcutaneous TIDWM    lidocaine HCL 10 mg/ml (1%)  10 mL Other Once    midodrine  15 mg Oral TID    mirtazapine  7.5 mg Oral QHS    mycophenolate  1,000 mg Oral BID    pantoprazole  40 mg Oral Daily    polyethylene glycol  17 g Oral Daily    predniSONE  20 mg Oral Daily    sodium bicarbonate  1,300 mg Oral QID    sulfamethoxazole-trimethoprim 400-80mg  1 tablet Oral Every Mon, Wed, Fri    ursodiol  300 mg Oral BID    [START ON 10/15/2018] valGANciclovir  450 mg Oral Daily     Continuous Infusions:   insulin (HUMAN R) infusion (adults) 1.8 Units/hr (10/11/18 0853)     PRN Meds:bisacodyl, calcium carbonate, dextrose 50%, dextrose 50%, glucagon (human recombinant), glucose, glucose, insulin aspart U-100, insulin aspart U-100, ondansetron, oxyCODONE, oxyCODONE, promethazine (PHENERGAN) IVPB, sodium chloride 0.9%    Review of Systems   Constitutional: Negative for chills, fatigue and fever.   Respiratory: Negative for cough and shortness of breath.    Cardiovascular: Positive for leg swelling. Negative for chest pain.   Gastrointestinal: Positive for abdominal distention. Negative for abdominal pain, constipation, diarrhea and vomiting.   Genitourinary: Negative for decreased urine volume, difficulty urinating and dysuria.   Musculoskeletal: Negative for arthralgias and back pain.   Allergic/Immunologic: Positive for immunocompromised state.   Neurological: Negative for tremors and headaches.     Objective:     Vital Signs (Most Recent):  Temp: 97.6 °F (36.4 °C) (10/11/18 1238)  Pulse: 98 (10/11/18 1140)  Resp: 16 (10/11/18 0000)  BP: 124/63 (10/11/18 0000)  SpO2: 100 % (10/11/18 0000) Vital Signs (24h  Range):  Temp:  [97.5 °F (36.4 °C)-97.6 °F (36.4 °C)] 97.6 °F (36.4 °C)  Pulse:  [] 98  Resp:  [15-16] 16  SpO2:  [96 %-100 %] 100 %  BP: (111-124)/(63-74) 124/63     Weight: 65 kg (143 lb 6.3 oz)  Body mass index is 25.4 kg/m².    Intake/Output - Last 3 Shifts       10/09 0700 - 10/10 0659 10/10 0700 - 10/11 0659 10/11 0700 - 10/12 0659    P.O. 1160 720     I.V. (mL/kg) 1190.9 (18) 31.3 (0.5)     IV Piggyback 1100      Total Intake(mL/kg) 3450.9 (52.3) 751.3 (11.6)     Urine (mL/kg/hr) 500 (0.3) 520 (0.3)     Emesis/NG output 0 0     Drains 310 170     Other  0     Stool 0 0     Total Output 810 690     Net +2640.9 +61.3            Urine Occurrence 2 x 4 x     Stool Occurrence 1 x 5 x     Emesis Occurrence 0 x 0 x           Physical Exam   Constitutional: She is oriented to person, place, and time. She appears well-developed.   Temporal and distal extremity muscle wasting   Eyes: EOM are normal. Pupils are equal, round, and reactive to light. Scleral icterus is present.   Neck: Normal range of motion.   Cardiovascular: Normal rate and regular rhythm.   No murmur heard.  Pulmonary/Chest: Effort normal and breath sounds normal. No respiratory distress. She has no wheezes.   Abdominal: Soft. Bowel sounds are normal. She exhibits distension. There is no tenderness.   Chevron incision w/ staples, CDI     Musculoskeletal: Normal range of motion. She exhibits edema (BL lower extremities).   Neurological: She is alert and oriented to person, place, and time.   Skin: Skin is warm and dry.   Nursing note and vitals reviewed.      Laboratory:  Immunosuppressants         Stop Route Frequency     mycophenolate capsule 1,000 mg      -- Oral 2 times daily        CBC:   Recent Labs   Lab  10/11/18   0600   WBC  6.19   RBC  2.79*   HGB  8.5*   HCT  24.0*   PLT  55*   MCV  86   MCH  30.5   MCHC  35.4     CMP:   Recent Labs   Lab  10/11/18   0600   GLU  212*   CALCIUM  8.5*   ALBUMIN  2.9*   PROT  4.0*   NA  133*   K  3.6   CO2   20*   CL  100   BUN  91*   CREATININE  1.9*   ALKPHOS  139*   ALT  110*   AST  48*   BILITOT  10.5*     Labs within the past 24 hours have been reviewed.    Diagnostic Results:  I have personally reviewed all pertinent imaging studies.

## 2018-10-11 NOTE — ASSESSMENT & PLAN NOTE
Managed per primary team  Avoid hypoglycemia    No results for input(s): ALT, AST, ALKPHOS, BILITOT, PROT, ALBUMIN in the last 24 hours.

## 2018-10-11 NOTE — ASSESSMENT & PLAN NOTE
BG goal 140 - 180     Increase transition IV insulin drip at  1.8  u/hr  Continue Novolog 3-5 units with meals   2 units PRN protein shake  Low dose correction scale insulin  BG monitoring /HS/0200    ADDENDUM: change to novolog 6 units with meals    Discharge planning:  TBD

## 2018-10-11 NOTE — H&P (VIEW-ONLY)
[facility]  Nephrology  Consult Note    Patient Name: Scarlett Reddy  MRN: [id number]  Admission Date: 10/1/2018  Hospital Length of Stay: 9 days  Attending Provider: Benson Matson MD   Primary Care Physician: Primary Doctor No  Principal Problem:Liver transplanted    Inpatient consult to Nephrology  Consult performed by: Darrel Adhikari MD  Consult ordered by: Jihan Soares PA-C        Subjective:     HPI: 69 y/o female with ESLD due to Hep B and D cirrhosis MELD of 23 on 10/01/18.  Paracentesis 10/1 with 5L removed, no fluid sent for analysis. 10/02 significant for hyponatremia, Na 119 and she was admitted to LTS for sodium monitoring. On 10/05/18 she is s/p DBDLT (CMV D+/R+, steroid induction, MMF/tacro/pred maintenance) with take back on 10/6 secondary to hyperbilirubinema, no leak identified. Post-op course complicated by hypercapneic and hypoxic respiratory failure and was intubated 10/06 and extubated 10/07. Liver bx (10/6) sig for Zone 3 hemorrhage and collapse, in addition to cholestasis. ID consulted to comment on positive diphtheroid culture from ascitic fluid (likely skin colonization) as well as ESBL Klebsiella pneumoniae in urine culture (10/4).  Pt asymptomatic and cell count from ascitic fluid unremarkable. Mild peritransplant ascites- repeat US 10/6 with increased arterial resistive indices. Repeat US 10/9 w/ continued elevation in RIs and fluid collections. LFTs trending down nicely.     Patient is currently on entecavir 0.5 mg, Hepagam, mycophenalate 1000 mg BID, prednisone 20 mg and prophylaxis with Bactrim, fluconazole and Valgancyclovir.    Nephrology consulted for acute kidney failure is setting of Orthotopic Liver Transplant. Cr 0.8 on 10/05, started to trend up and 1.5 (10/09), 1.8 (10/10), and 1.9 (10/11). UOP 10/10/18: 520 ml. UA (10/09): 1+ occult blood and 1+ protein with RBC and amorphous casts.      Past Medical History:   Diagnosis Date    Anemia requiring  transfusions 8/16/2018    Ascites     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 8/15/2018    Cirrhosis     Cough 8/30/2018    Esophageal varices     Esophageal varices without bleeding 8/15/2018    Hepatic encephalopathy     Hepatitis B     Hepatitis D virus infection     Hypersplenism     Hyponatremia     Hypotension     Liver transplant candidate     Neutropenia     Portal hypertension     Severe malnutrition 8/16/2018    Type 2 diabetes mellitus, with long-term current use of insulin 8/15/2018       Past Surgical History:   Procedure Laterality Date    EGD (ESOPHAGOGASTRODUODENOSCOPY) N/A 8/17/2018    Performed by Travon Delgadillo MD at [facility] ([address] [place] [address])    ESOPHAGOGASTRODUODENOSCOPY N/A 8/17/2018    Procedure: EGD (ESOPHAGOGASTRODUODENOSCOPY);  Surgeon: Travon Delgadillo MD;  Location: [facility] ([address] [place] [address]);  Service: Endoscopy;  Laterality: N/A;    EXPLORATORY LAPAROTOMY AFTER LIVER TRANSPLANTATION N/A 10/6/2018    Procedure: LAPAROTOMY, EXPLORATORY, AFTER LIVER TRANSPLANT;  Surgeon: Benson Matson MD;  Location: [facility] OR [address] [place] [address];  Service: Transplant;  Laterality: N/A;    LAPAROTOMY, EXPLORATORY, AFTER LIVER TRANSPLANT N/A 10/6/2018    Performed by Benson Matson MD at [facility] OR [address] [place] [address]    LIVER TRANSPLANT N/A 10/4/2018    Procedure: TRANSPLANT, LIVER;  Surgeon: Yony Burns MD;  Location: [facility] OR [address] [place] [address];  Service: Transplant;  Laterality: N/A;    TRANSPLANT, LIVER N/A 10/4/2018    Performed by Yony Burns MD at [facility] OR [address] [place] [address]       Review of patient's allergies indicates:  No Known Allergies  Current Facility-Administered Medications   Medication Frequency    bisacodyl suppository 10 mg Daily PRN    calcium carbonate 200 mg calcium (500 mg) chewable tablet 500 mg TID PRN    dextrose 50% injection 12.5 g PRN    dextrose 50% injection 25 g PRN    entecavir tablet 0.5 mg Q48H    ergocalciferol capsule 50,000 Units Q7 Days    fluconazole tablet 200 mg Daily     glucagon (human recombinant) injection 1 mg PRN    glucose chewable tablet 16 g PRN    glucose chewable tablet 24 g PRN    heparin (porcine) injection 5,000 Units Q8H    insulin aspart U-100 pen 0-4 Units PRN    insulin aspart U-100 pen 2 Units TID PRN    insulin aspart U-100 pen 3-5 Units TIDWM    insulin regular (Humulin R) 100 Units in sodium chloride 0.9% 100 mL infusion Continuous    lidocaine HCL 10 mg/ml (1%) injection 10 mL Once    midodrine tablet 15 mg TID    mirtazapine tablet 7.5 mg QHS    mycophenolate capsule 1,000 mg BID    ondansetron disintegrating tablet 8 mg Q8H PRN    oxyCODONE immediate release tablet 5 mg Q4H PRN    oxyCODONE immediate release tablet Tab 10 mg Q4H PRN    pantoprazole EC tablet 40 mg Daily    polyethylene glycol packet 17 g Daily    predniSONE tablet 20 mg Daily    promethazine (PHENERGAN) 6.25 mg in dextrose 5 % 50 mL IVPB Q6H PRN    sodium bicarbonate tablet 1,300 mg QID    sodium chloride 0.9% flush 3 mL PRN    sulfamethoxazole-trimethoprim 400-80mg per tablet 1 tablet Every Mon, Wed, Fri    ursodiol capsule 300 mg BID    [START ON 10/15/2018] valGANciclovir tablet 450 mg Daily     Family History     None        Tobacco Use    Smoking status: Never Smoker    Smokeless tobacco: Never Used   Substance and Sexual Activity    Alcohol use: No     Frequency: Never    Drug use: No    Sexual activity: Not on file     Review of Systems   Constitutional: Negative for chills, fatigue and fever.   Respiratory: Negative for cough and shortness of breath.    Cardiovascular: Positive for leg swelling. Negative for chest pain.   Gastrointestinal: Positive for abdominal distention. Negative for abdominal pain, constipation, diarrhea and vomiting.   Genitourinary: Negative for decreased urine volume, difficulty urinating and dysuria.   Musculoskeletal: Negative for arthralgias and back pain.   Allergic/Immunologic: Positive for immunocompromised state.    Neurological: Negative for tremors and headaches.      Objective:     Vital Signs (Most Recent):  Temp: 97.6 °F (36.4 °C) (10/11/18 1238)  Pulse: 98 (10/11/18 1140)  Resp: 16 (10/11/18 0000)  BP: 124/63 (10/11/18 0000)  SpO2: 100 % (10/11/18 0000)  O2 Device (Oxygen Therapy): room air (10/10/18 1253) Vital Signs (24h Range):  Temp:  [97.5 °F (36.4 °C)-97.6 °F (36.4 °C)] 97.6 °F (36.4 °C)  Pulse:  [] 98  Resp:  [15-16] 16  SpO2:  [100 %] 100 %  BP: (111-124)/(63-74) 124/63     Weight: 65 kg (143 lb 6.3 oz) (10/11/18 0552)  Body mass index is 25.4 kg/m².  Body surface area is 1.7 meters squared.    I/O last 3 completed shifts:  In: 1891.4 [P.O.:1140; I.V.:751.4]  Out: 1060 [Urine:720; Drains:340]    Physical Exam   Constitutional: She is oriented to person, place, and time. She appears well-developed.   Temporal and distal extremity muscle wasting   Eyes: EOM are normal. Pupils are equal, round, and reactive to light. Scleral icterus is present.   Neck: Normal range of motion.   Cardiovascular: Normal rate and regular rhythm.   No murmur heard.  Pulmonary/Chest: Effort normal and breath sounds normal. No respiratory distress. She has no wheezes.   Abdominal: Soft. Bowel sounds are normal. She exhibits distension. There is no tenderness.   Chevron incision w/ staples, CDI     Musculoskeletal: Normal range of motion. She exhibits edema (BL lower extremities).   Neurological: She is alert and oriented to person, place, and time.   Skin: Skin is warm and dry.   Nursing note and vitals reviewed.     Significant Labs:  CBC:   Recent Labs   Lab  10/11/18   0600   WBC  6.19   RBC  2.79*   HGB  8.5*   HCT  24.0*   PLT  55*   MCV  86   MCH  30.5   MCHC  35.4     CMP:   Recent Labs   Lab  10/11/18   0600   GLU  212*   CALCIUM  8.5*   ALBUMIN  2.9*   PROT  4.0*   NA  133*   K  3.6   CO2  20*   CL  100   BUN  91*   CREATININE  1.9*   ALKPHOS  139*   ALT  110*   AST  48*   BILITOT  10.5*     All labs within the past 24  hours have been reviewed.      Assessment/Plan:      Post LT - Acute renal failure    Post-LT EVA is multifactorial in origin and possibly related to the severity of liver disease, toxicity of immunosuppressive therapy and hepatic ischaemia reperfusion injury might be a driving force in the aetiology of post-LT EVA.      - acute gradual worsening of renal function s/p OLT  - 10/06 was taken to the OR for possibility of billiary leak and post op complication due to hypercapnic hypoxic respiratory failure, no blood pressure drop intraoperatively per anesthesiology notes. Patient has not been needing vasopressors to maintain BP  - Creatinine on arrival 0.8 and has been up trending   - BUN/cr (10/11/18): 91/1.9   Tacrolimus 26.9 (10/08)      Assessment: Acute Renal failure in setting of hepatic ischemia reperfusion injury and poor perfusion status vs acute tubular necrosis from immunosuppressive agents      Plan:  - will get repeat UA and microscopic analysis  - will get urine lytes Fang, Uk, Ucl, Uosm  - Protein /creatinine ratio  - spin urine to evaluate ATN  - renal ultrasound to assess hydronephrosis  - avoid nephrotoxic drugs, ACEI/ARB, NSAIDS  - adjust drugs to GFR: bactrim MWF  - Repeat tacrolimus levels and consider renal adjustment  - Acute RRT not indicated at this point  - will reassess in am with repeat RFT's and electrolytes           Thank you for your consult. I will follow-up with patient. Please contact us if you have any additional questions.    Darrel Adhikari MD   Internal Medicine PGY II  Pager: [phone]  Nephrology  [facility]

## 2018-10-11 NOTE — SUBJECTIVE & OBJECTIVE
Past Medical History:   Diagnosis Date    Anemia requiring transfusions 8/16/2018    Ascites     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 8/15/2018    Cirrhosis     Cough 8/30/2018    Esophageal varices     Esophageal varices without bleeding 8/15/2018    Hepatic encephalopathy     Hepatitis B     Hepatitis D virus infection     Hypersplenism     Hyponatremia     Hypotension     Liver transplant candidate     Neutropenia     Portal hypertension     Severe malnutrition 8/16/2018    Type 2 diabetes mellitus, with long-term current use of insulin 8/15/2018       Past Surgical History:   Procedure Laterality Date    EGD (ESOPHAGOGASTRODUODENOSCOPY) N/A 8/17/2018    Performed by Travon Delgadillo MD at [facility] ([address] [place] [address])    ESOPHAGOGASTRODUODENOSCOPY N/A 8/17/2018    Procedure: EGD (ESOPHAGOGASTRODUODENOSCOPY);  Surgeon: Travon Delgadillo MD;  Location: [facility] ([address] [place] [address]);  Service: Endoscopy;  Laterality: N/A;    EXPLORATORY LAPAROTOMY AFTER LIVER TRANSPLANTATION N/A 10/6/2018    Procedure: LAPAROTOMY, EXPLORATORY, AFTER LIVER TRANSPLANT;  Surgeon: Benson Matson MD;  Location: [facility] OR [address] [place] [address];  Service: Transplant;  Laterality: N/A;    LAPAROTOMY, EXPLORATORY, AFTER LIVER TRANSPLANT N/A 10/6/2018    Performed by Benson Matson MD at [facility] OR [address] [place] [address]    LIVER TRANSPLANT N/A 10/4/2018    Procedure: TRANSPLANT, LIVER;  Surgeon: Yony Burns MD;  Location: [facility] OR [address] [place] [address];  Service: Transplant;  Laterality: N/A;    TRANSPLANT, LIVER N/A 10/4/2018    Performed by Yony Burns MD at [facility] OR [address] [place] [address]       Review of patient's allergies indicates:  No Known Allergies  Current Facility-Administered Medications   Medication Frequency    bisacodyl suppository 10 mg Daily PRN    calcium carbonate 200 mg calcium (500 mg) chewable tablet 500 mg TID PRN    dextrose 50% injection 12.5 g PRN    dextrose 50% injection 25 g PRN    entecavir tablet 0.5 mg Q48H    ergocalciferol  capsule 50,000 Units Q7 Days    fluconazole tablet 200 mg Daily    glucagon (human recombinant) injection 1 mg PRN    glucose chewable tablet 16 g PRN    glucose chewable tablet 24 g PRN    heparin (porcine) injection 5,000 Units Q8H    insulin aspart U-100 pen 0-4 Units PRN    insulin aspart U-100 pen 2 Units TID PRN    insulin aspart U-100 pen 3-5 Units TIDWM    insulin regular (Humulin R) 100 Units in sodium chloride 0.9% 100 mL infusion Continuous    lidocaine HCL 10 mg/ml (1%) injection 10 mL Once    midodrine tablet 15 mg TID    mirtazapine tablet 7.5 mg QHS    mycophenolate capsule 1,000 mg BID    ondansetron disintegrating tablet 8 mg Q8H PRN    oxyCODONE immediate release tablet 5 mg Q4H PRN    oxyCODONE immediate release tablet Tab 10 mg Q4H PRN    pantoprazole EC tablet 40 mg Daily    polyethylene glycol packet 17 g Daily    predniSONE tablet 20 mg Daily    promethazine (PHENERGAN) 6.25 mg in dextrose 5 % 50 mL IVPB Q6H PRN    sodium bicarbonate tablet 1,300 mg QID    sodium chloride 0.9% flush 3 mL PRN    sulfamethoxazole-trimethoprim 400-80mg per tablet 1 tablet Every Mon, Wed, Fri    ursodiol capsule 300 mg BID    [START ON 10/15/2018] valGANciclovir tablet 450 mg Daily     Family History     None        Tobacco Use    Smoking status: Never Smoker    Smokeless tobacco: Never Used   Substance and Sexual Activity    Alcohol use: No     Frequency: Never    Drug use: No    Sexual activity: Not on file     Review of Systems   Constitutional: Negative for chills, fatigue and fever.   Respiratory: Negative for cough and shortness of breath.    Cardiovascular: Positive for leg swelling. Negative for chest pain.   Gastrointestinal: Positive for abdominal distention. Negative for abdominal pain, constipation, diarrhea and vomiting.   Genitourinary: Negative for decreased urine volume, difficulty urinating and dysuria.   Musculoskeletal: Negative for arthralgias and back pain.    Allergic/Immunologic: Positive for immunocompromised state.   Neurological: Negative for tremors and headaches.      Objective:     Vital Signs (Most Recent):  Temp: 97.6 °F (36.4 °C) (10/11/18 1238)  Pulse: 98 (10/11/18 1140)  Resp: 16 (10/11/18 0000)  BP: 124/63 (10/11/18 0000)  SpO2: 100 % (10/11/18 0000)  O2 Device (Oxygen Therapy): room air (10/10/18 1253) Vital Signs (24h Range):  Temp:  [97.5 °F (36.4 °C)-97.6 °F (36.4 °C)] 97.6 °F (36.4 °C)  Pulse:  [] 98  Resp:  [15-16] 16  SpO2:  [100 %] 100 %  BP: (111-124)/(63-74) 124/63     Weight: 65 kg (143 lb 6.3 oz) (10/11/18 0552)  Body mass index is 25.4 kg/m².  Body surface area is 1.7 meters squared.    I/O last 3 completed shifts:  In: 1891.4 [P.O.:1140; I.V.:751.4]  Out: 1060 [Urine:720; Drains:340]    Physical Exam   Constitutional: She is oriented to person, place, and time. She appears well-developed.   Temporal and distal extremity muscle wasting   Eyes: EOM are normal. Pupils are equal, round, and reactive to light. Scleral icterus is present.   Neck: Normal range of motion.   Cardiovascular: Normal rate and regular rhythm.   No murmur heard.  Pulmonary/Chest: Effort normal and breath sounds normal. No respiratory distress. She has no wheezes.   Abdominal: Soft. Bowel sounds are normal. She exhibits distension. There is no tenderness.   Chevron incision w/ staples, CDI     Musculoskeletal: Normal range of motion. She exhibits edema (BL lower extremities).   Neurological: She is alert and oriented to person, place, and time.   Skin: Skin is warm and dry.   Nursing note and vitals reviewed.     Significant Labs:  CBC:   Recent Labs   Lab  10/11/18   0600   WBC  6.19   RBC  2.79*   HGB  8.5*   HCT  24.0*   PLT  55*   MCV  86   MCH  30.5   MCHC  35.4     CMP:   Recent Labs   Lab  10/11/18   0600   GLU  212*   CALCIUM  8.5*   ALBUMIN  2.9*   PROT  4.0*   NA  133*   K  3.6   CO2  20*   CL  100   BUN  91*   CREATININE  1.9*   ALKPHOS  139*   ALT  110*    AST  48*   BILITOT  10.5*     All labs within the past 24 hours have been reviewed.

## 2018-10-11 NOTE — PROGRESS NOTES
[facility]  Liver Transplant  Progress Note    Patient Name: Scarlett Reddy  MRN: [id number]  Admission Date: 10/1/2018  Hospital Length of Stay: 9 days  Code Status: Full Code  Primary Care Provider: Primary Doctor No  Post-Operative Day: 6    ORGAN:   LIVER  Disease Etiology: Cirrhosis: Type B and D  Donor Type:    - Brain Death  CDC High Risk:   No  Donor CMV Status:   Donor CMV Status: Positive  Donor HBcAB:   Negative  Donor HCV Status:   Negative  Donor HBV SETH: Negative  Donor HCV SETH: Negative  Whole or Partial: Whole Liver  Biliary Anastomosis: End to End  Arterial Anatomy: Replaced Left Hepatic and Right Hepatic  Subjective:     History of Present Illness:  Ms. Reddy is a 69 y/o female with PMH of ESLD secondary Hep B and D.  Listed for liver transplant with MELD 23.  Paracentesis 10/1 with 5L removed, no fluid sent for analysis.  Morning labs significant for hyponatremia, Na 119.  Pt admitted to LTS for sodium monitoring.        Hospital Course:  Hospital Course: 69 y/o F h/o HBV/delta cirrhosis s/p DBDLT 10/5/18 (CMV D+/R+, steroid induction, MMF/tacro/pred maintenance) with take back on 10/6 2/2 hyperbilirubinema, no leak identified. Post-op course c/b hypercapneic and hypoxic respiratory failure and was reintubated though quickly extubated now doing well. Liver bx (10/6) sig for Zone 3 hemorrhage and collapse, in addition to cholestasis. Transferred from ICU on POD #4. ID consulted to comment on positive diphtheroid culture from ascitic fluid (likely skin colonization) as well as ESBL Klebsiella pneumoniae in urine culture (10/4).  Pt asymptomatic and cell count from ascitic fluid unremarkable. Per ID recs, no need to treat diphtheroids or asymptomatic ESBL Kleb pneumo bacteriuria though pt has had 6 days of therapy which would cover these organisms. Mild peritransplant ascites- repeat US 10/6 with increased arterial resistive indices. Repeat US 10/9 w/ continued elevation in  RIs and fluid collections. LFTs trending down nicely.     Interval history: No acute events overnight. T bili trending down. Cr trending up. Remains hypervolemic, likely 2/2 renal insufficiency. Consulted nephrology for post-op EVA. Will appreciate recs. Repeating Echo today. Pain controlled. +Bm. VSS. No further complaints at this time.     Scheduled Meds:   entecavir  0.5 mg Oral Q48H    ergocalciferol  50,000 Units Oral Q7 Days    fluconazole  200 mg Oral Daily    heparin (porcine)  5,000 Units Subcutaneous Q8H    hepatitis B immune globulin (HEPA-RALPH B) IVPB  9,999.912 Units Intravenous Daily    insulin aspart U-100  3-5 Units Subcutaneous TIDWM    lidocaine HCL 10 mg/ml (1%)  10 mL Other Once    midodrine  15 mg Oral TID    mirtazapine  7.5 mg Oral QHS    mycophenolate  1,000 mg Oral BID    pantoprazole  40 mg Oral Daily    polyethylene glycol  17 g Oral Daily    predniSONE  20 mg Oral Daily    sodium bicarbonate  1,300 mg Oral QID    sulfamethoxazole-trimethoprim 400-80mg  1 tablet Oral Every Mon, Wed, Fri    ursodiol  300 mg Oral BID    [START ON 10/15/2018] valGANciclovir  450 mg Oral Daily     Continuous Infusions:   insulin (HUMAN R) infusion (adults) 1.8 Units/hr (10/11/18 0853)     PRN Meds:bisacodyl, calcium carbonate, dextrose 50%, dextrose 50%, glucagon (human recombinant), glucose, glucose, insulin aspart U-100, insulin aspart U-100, ondansetron, oxyCODONE, oxyCODONE, promethazine (PHENERGAN) IVPB, sodium chloride 0.9%    Review of Systems   Constitutional: Negative for chills, fatigue and fever.   Respiratory: Negative for cough and shortness of breath.    Cardiovascular: Positive for leg swelling. Negative for chest pain.   Gastrointestinal: Positive for abdominal distention. Negative for abdominal pain, constipation, diarrhea and vomiting.   Genitourinary: Negative for decreased urine volume, difficulty urinating and dysuria.   Musculoskeletal: Negative for arthralgias and back  pain.   Allergic/Immunologic: Positive for immunocompromised state.   Neurological: Negative for tremors and headaches.     Objective:     Vital Signs (Most Recent):  Temp: 97.6 °F (36.4 °C) (10/11/18 1238)  Pulse: 98 (10/11/18 1140)  Resp: 16 (10/11/18 0000)  BP: 124/63 (10/11/18 0000)  SpO2: 100 % (10/11/18 0000) Vital Signs (24h Range):  Temp:  [97.5 °F (36.4 °C)-97.6 °F (36.4 °C)] 97.6 °F (36.4 °C)  Pulse:  [] 98  Resp:  [15-16] 16  SpO2:  [96 %-100 %] 100 %  BP: (111-124)/(63-74) 124/63     Weight: 65 kg (143 lb 6.3 oz)  Body mass index is 25.4 kg/m².    Intake/Output - Last 3 Shifts       10/09 0700 - 10/10 0659 10/10 0700 - 10/11 0659 10/11 0700 - 10/12 0659    P.O. 1160 720     I.V. (mL/kg) 1190.9 (18) 31.3 (0.5)     IV Piggyback 1100      Total Intake(mL/kg) 3450.9 (52.3) 751.3 (11.6)     Urine (mL/kg/hr) 500 (0.3) 520 (0.3)     Emesis/NG output 0 0     Drains 310 170     Other  0     Stool 0 0     Total Output 810 690     Net +2640.9 +61.3            Urine Occurrence 2 x 4 x     Stool Occurrence 1 x 5 x     Emesis Occurrence 0 x 0 x           Physical Exam   Constitutional: She is oriented to person, place, and time. She appears well-developed.   Temporal and distal extremity muscle wasting   Eyes: EOM are normal. Pupils are equal, round, and reactive to light. Scleral icterus is present.   Neck: Normal range of motion.   Cardiovascular: Normal rate and regular rhythm.   No murmur heard.  Pulmonary/Chest: Effort normal and breath sounds normal. No respiratory distress. She has no wheezes.   Abdominal: Soft. Bowel sounds are normal. She exhibits distension. There is no tenderness.   Chevron incision w/ staples, CDI     Musculoskeletal: Normal range of motion. She exhibits edema (BL lower extremities).   Neurological: She is alert and oriented to person, place, and time.   Skin: Skin is warm and dry.   Nursing note and vitals reviewed.      Laboratory:  Immunosuppressants         Stop Route Frequency      mycophenolate capsule 1,000 mg      -- Oral 2 times daily        CBC:   Recent Labs   Lab  10/11/18   0600   WBC  6.19   RBC  2.79*   HGB  8.5*   HCT  24.0*   PLT  55*   MCV  86   MCH  30.5   MCHC  35.4     CMP:   Recent Labs   Lab  10/11/18   0600   GLU  212*   CALCIUM  8.5*   ALBUMIN  2.9*   PROT  4.0*   NA  133*   K  3.6   CO2  20*   CL  100   BUN  91*   CREATININE  1.9*   ALKPHOS  139*   ALT  110*   AST  48*   BILITOT  10.5*     Labs within the past 24 hours have been reviewed.    Diagnostic Results:  I have personally reviewed all pertinent imaging studies.    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   -POD 1 US: increased arterial resistive indices, suggestive of edema vs cute rejection vs congestion  -Repeat US 10/9 with continued elevation of RIs  -LFTs trending down  -T bili trending down  -Post-op EVA. Cr trending up  -consulted nephrology. Will appreciate recs  -repeating US today  -Echo today          EVA (acute kidney injury)    Post-op EVA  Cr trending up  Consulted nephrology. Will appreciate recs  Echo today          Metabolic acidosis    Started Na bicarb 1300 QID          At risk for opportunistic infections    -Valcyte for CMV prophylaxis --> will start at discharge or  POD #10 in hospital  - Bactrim for PCP prophylaxis   -Fluconazole for fungal prophylaxis           Long-term use of immunosuppressant medication    Cellcept/Prograf/pred  Held prograf 10/8 for elevated levels  Resumed Prograf 10/10 at dose of 0.5mg x 1.   Will monitor for signs of toxic side effects, check daily tacrolimus troughs, and change meds accordingly.          Prophylactic immunotherapy    See long term use immunosuppresion          Ascites    - paracentesis 10/1, 5L removed, no fluid sent for analysis.  - diuresing with albumin and lasix 10/9            Physical deconditioning    -PT/OT consulted.  -Recommend home health PT and rolling walker at discharge           Hyponatremia    - Na 119 on admit.  -Na improving post transplant                  Hypotension    - continue Midodrine 15 mg TID.           Severe malnutrition    - dietary consulted.   -temporal and distal extremity muscle wasting and edema  -supplements ordered          Type 2 diabetes mellitus with diabetic polyneuropathy, with long-term current use of insulin    - continue home regimen.  - endocrine consulted.  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      Jihan Soares PA-C  Liver Transplant  [facility]

## 2018-10-11 NOTE — ASSESSMENT & PLAN NOTE
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      - acute gradual worsening of renal function s/p OLT  - 10/06 was taken to the OR for possibility of billiary leak and post op complication due to hypercapnic hypoxic respiratory failure, no blood pressure drop intraoperatively per anesthesiology notes. Patient has not been needing vasopressors to maintain BP  - Creatinine on arrival 0.8 and has been up trending   - BUN/cr (10/11/18): 91/1.9   Tacrolimus 26.9 (10/08)      Assessment: Acute Renal failure in setting of hepatic ischemia reperfusion injury and poor perfusion status vs acute tubular necrosis from immunosuppressive agents      Plan:  - will get repeat UA and microscopic analysis  - will get urine lytes Fang, Uk, Ucl, Uosm  - Protein /creatinine ratio  - spin urine to evaluate ATN  - renal ultrasound to assess hydronephrosis  - avoid nephrotoxic drugs, ACEI/ARB, NSAIDS  - adjust drugs to GFR: bactrim TID  - Repeat tacrolimus levels and consider renal adjustment

## 2018-10-11 NOTE — PROGRESS NOTES
Met with patient, , daughter and son with [place] , [place].  Started discharge education.  Reviewed My New Journey: Living Smart After My Liver Transplant.  Section reviewed today was First Steps.  Allowed time for questions and answers.

## 2018-10-11 NOTE — PROGRESS NOTES
TIMBO met with pt, pts , pts son who is in from [facility] as of yesterday and pts dgtr Susan at bedside with [place]  [place].   Pt presents in bedside chair, alert and oriented x 4, engaging, good eye contact today.  Pt denies any symptoms of depression or anxiety and denies any coping issues.   Pt in good spirits, happy that her son is here.   Pts son is in from [place] for a week, he will return home next week.   pts son reports that pts dgesther Davis returns home to [place] next week as well, but that her other dgtr Angela will return next week for 3 weeks.   The family is trying to figure out a plan for when Angela returns to [place].  They are in process of meeting a paid caregiver from the [place] community to come in a few hours a day once Angela leaves, until one of the children can return.   Pts son today asking why two caregivers are needed with patient during her local stay, since the insurance only covers expenses for one person with patient.   SW provided education about benefits of having two caregivers with patient during her entire stay will increase the patient's psychosocial success in transplant recovery.   No other psychosocial concerns identified at this time.  Per LTS rounding team, no dc plans for now, maybe early next week.   TIMBO remains available for all transplant resources, education, psychosocial support and assist with all dc planning needs.

## 2018-10-11 NOTE — SUBJECTIVE & OBJECTIVE
"Interval HPI:   Overnight events: remains in TSU. BG slightly above goal overnight on insulin infusion 1.6 u/hr; did not receive correction scale. Prednisone 20 mg daily; standard steroid taper.   Eatin% - 100%   Nausea: No  Hypoglycemia and intervention: No  Fever: No  TPN and/or TF: No    /63   Pulse 84   Temp 97.6 °F (36.4 °C) (Oral)   Resp 16   Ht 5' 3" (1.6 m)   Wt 65 kg (143 lb 6.3 oz)   LMP  (LMP Unknown)   SpO2 100%   Breastfeeding? No   BMI 25.40 kg/m²     Labs Reviewed and Include    No results for input(s): GLU, CALCIUM, ALBUMIN, PROT, NA, K, CO2, CL, BUN, CREATININE, ALKPHOS, ALT, AST, BILITOT in the last 24 hours.  Lab Results   Component Value Date    WBC 6.19 10/11/2018    HGB 8.5 (L) 10/11/2018    HCT 24.0 (L) 10/11/2018    MCV 86 10/11/2018    PLT 55 (L) 10/11/2018     No results for input(s): TSH, FREET4 in the last 168 hours.  Lab Results   Component Value Date    HGBA1C 6.0 (H) 10/04/2018       Nutritional status:   Body mass index is 25.4 kg/m².  Lab Results   Component Value Date    ALBUMIN 3.1 (L) 10/10/2018    ALBUMIN 2.5 (L) 10/09/2018    ALBUMIN 2.4 (L) 10/08/2018     Lab Results   Component Value Date    PREALBUMIN 12 (L) 2018       Estimated Creatinine Clearance: 26.7 mL/min (A) (based on SCr of 1.8 mg/dL (H)).    Accu-Checks  Recent Labs      10/09/18   0204  10/09/18   0813  10/09/18   1402  10/09/18   2118  10/10/18   0202  10/10/18   0237  10/10/18   0814  10/10/18   1334  10/10/18   1814  10/10/18   2053   POCTGLUCOSE  209*  149*  243*  202*  84  91  230*  267*  216*  216*       Current Medications and/or Treatments Impacting Glycemic Control  Immunotherapy:    Immunosuppressants         Stop Route Frequency     mycophenolate capsule 1,000 mg      -- Oral 2 times daily        Steroids:   Hormones (From admission, onward)    Start     Stop Route Frequency Ordered    10/11/18 0900  predniSONE tablet 20 mg      10/18 0859 Oral Daily 10/10/18 08    "     Pressors:    Autonomic Drugs (From admission, onward)    Start     Stop Route Frequency Ordered    10/10/18 1500  midodrine tablet 15 mg      -- Oral 3 times daily 10/10/18 1122    10/06/18 1617  rocuronium (ZEMURON) 10 mg/mL injection     Comments:  Created by cabinet override    10/07 0429   10/06/18 1617        Hyperglycemia/Diabetes Medications:   Antihyperglycemics (From admission, onward)    Start     Stop Route Frequency Ordered    10/09/18 1645  insulin aspart U-100 pen 3-5 Units      -- SubQ 3 times daily with meals 10/09/18 1417    10/09/18 1535  insulin aspart U-100 pen 2 Units      -- SubQ 3 times daily PRN 10/09/18 1436    10/07/18 1200  insulin regular (Humulin R) 100 Units in sodium chloride 0.9% 100 mL infusion      -- IV Continuous 10/07/18 1055    10/07/18 1155  insulin aspart U-100 pen 0-4 Units      -- SubQ As needed (PRN) 10/07/18 1055

## 2018-10-11 NOTE — ASSESSMENT & PLAN NOTE
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   -POD 1 US: increased arterial resistive indices, suggestive of edema vs cute rejection vs congestion  -Repeat US 10/9 with continued elevation of RIs  -LFTs trending down  -T bili trending down  -Post-op EVA. Cr trending up  -consulted nephrology. Will appreciate recs  -repeating US today  -Echo today

## 2018-10-11 NOTE — HPI
69 y/o F h/o HBV/delta cirrhosis s/p DBDLT 10/5/18 (CMV D+/R+, steroid induction, MMF/tacro/pred maintenance) with take back on 10/6 2/2 hyperbilirubinema and bilious VALORIE drainage, no leak identified. Post-op course c/b hypercapneic and hypoxic respiratory failure and was reintubated though quickly extubated now doing well. Liver bx (10/6) sig for Zone 3 hemorrhage and collapse, in addition to cholestasis. Transferred from ICU on POD #4. ID consulted to comment on positive diphtheroid culture from ascitic fluid (likely skin colonization) as well as ESBL Klebsiella pneumoniae in urine culture (10/4).  Pt asymptomatic and cell count from ascitic fluid unremarkable. Per ID recs, no need to treat diphtheroids or asymptomatic ESBL Kleb pneumo bacteriuria though pt has had 6 days of therapy which would cover these organisms. Mild peritransplant ascites- repeat US 10/6 with increased arterial resistive indices. Repeat US 10/9 w/ continued elevation in RIs and fluid collections. LFTs trending down nicely.      Acute gradual worsening of renal function s/p OLTx.Creatinine on arrival 0.8 and has been up trending. Nephrology consulted for post-op EVA. Cr cont to trend up but remains to have good UOP. Had stable response to lasiz diuresis.  BUN elevated requiring multiple rounds of dialysis (10/15, 10/17, 10/20, 10/31). No significant improvements in kidney function noted.   In addition, pt H/H cont to fluctuate requiring multiple blood transfusions (10/14, 10/16, 10/18, 10/19, 10/22, 10/27, 10/29).  Pt reported dark stools 10/15 but color has normalized. FOBT+. EGD 10/18 consistent with ulcerated mucosa in duodenum s/p coagulation. Concern for H. Pylori. Started Amoxicillin/levofloxacin (10/19, complete 11/2)). Remains on Protonix 40 mg BID. Biopsy negative for infection and CMV. Will cont close monitoring H/H, transfuse as needed with goal Hb > 7.0.  Of note, Urine cx + Klebsiella pneumoniae (10/13). Received 3 day course of  ciprofloxacin, dc'd 10/19.   On 10/19 pm pt c/o crushing chest pain. Troponin 0.094, likely 2/2 ischemic stress. EKG NSR.      11/5/18 - no acute events overnight. Pt reports feeling well this am. One unit of PRBCs given 11/3 with good response. Cr level noted with only minimal increases. Lasix placed on hold per nephrology recs. Will plan to hydrate today w/ albumin. EGD 11/5 unimpressive for overt bleed. Pt remains on protonix 40 BID. Denies melena. Cont to hold heparin. WBC cont to decrease. Immunosuppression managed with hydrocortisone 50 (cont to hold Cellcept, bactrim, valcyte). Prograf now restarted and transitioned IV steroids to PO prednisone on 11/3. CMV PCR noted with 129. Cont to monitor.

## 2018-10-11 NOTE — ASSESSMENT & PLAN NOTE
Avoid insulin stacking and hypoglycemia.  Lab Results   Component Value Date    CREATININE 1.9 (H) 10/11/2018

## 2018-10-11 NOTE — CONSULTS
[facility]  Nephrology  Consult Note    Patient Name: Scarlett Reddy  MRN: [id number]  Admission Date: 10/1/2018  Hospital Length of Stay: 9 days  Attending Provider: Benson Matson MD   Primary Care Physician: Primary Doctor No  Principal Problem:Liver transplanted    Inpatient consult to Nephrology  Consult performed by: Darrel Adhikari MD  Consult ordered by: Jihan Soares PA-C        Subjective:     HPI: 67 y/o female with ESLD due to Hep B and D cirrhosis MELD of 23 on 10/01/18.  Paracentesis 10/1 with 5L removed, no fluid sent for analysis. 10/02 significant for hyponatremia, Na 119 and she was admitted to LTS for sodium monitoring. On 10/05/18 she is s/p DBDLT (CMV D+/R+, steroid induction, MMF/tacro/pred maintenance) with take back on 10/6 secondary to hyperbilirubinema, no leak identified. Post-op course complicated by hypercapneic and hypoxic respiratory failure and was intubated 10/06 and extubated 10/07. Liver bx (10/6) sig for Zone 3 hemorrhage and collapse, in addition to cholestasis. ID consulted to comment on positive diphtheroid culture from ascitic fluid (likely skin colonization) as well as ESBL Klebsiella pneumoniae in urine culture (10/4).  Pt asymptomatic and cell count from ascitic fluid unremarkable. Mild peritransplant ascites- repeat US 10/6 with increased arterial resistive indices. Repeat US 10/9 w/ continued elevation in RIs and fluid collections. LFTs trending down nicely.     Patient is currently on entecavir 0.5 mg, Hepagam, mycophenalate 1000 mg BID, prednisone 20 mg and prophylaxis with Bactrim, fluconazole and Valgancyclovir.    Nephrology consulted for acute kidney failure is setting of Orthotopic Liver Transplant. Cr 0.8 on 10/05, started to trend up and 1.5 (10/09), 1.8 (10/10), and 1.9 (10/11). UOP 10/10/18: 520 ml. UA (10/09): 1+ occult blood and 1+ protein with RBC and amorphous casts.      Past Medical History:   Diagnosis Date    Anemia requiring  transfusions 8/16/2018    Ascites     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 8/15/2018    Cirrhosis     Cough 8/30/2018    Esophageal varices     Esophageal varices without bleeding 8/15/2018    Hepatic encephalopathy     Hepatitis B     Hepatitis D virus infection     Hypersplenism     Hyponatremia     Hypotension     Liver transplant candidate     Neutropenia     Portal hypertension     Severe malnutrition 8/16/2018    Type 2 diabetes mellitus, with long-term current use of insulin 8/15/2018       Past Surgical History:   Procedure Laterality Date    EGD (ESOPHAGOGASTRODUODENOSCOPY) N/A 8/17/2018    Performed by Travon Delgadillo MD at [facility] ([address] [place] [address])    ESOPHAGOGASTRODUODENOSCOPY N/A 8/17/2018    Procedure: EGD (ESOPHAGOGASTRODUODENOSCOPY);  Surgeon: Travon Delgadillo MD;  Location: [facility] ([address] [place] [address]);  Service: Endoscopy;  Laterality: N/A;    EXPLORATORY LAPAROTOMY AFTER LIVER TRANSPLANTATION N/A 10/6/2018    Procedure: LAPAROTOMY, EXPLORATORY, AFTER LIVER TRANSPLANT;  Surgeon: Benson Matson MD;  Location: [facility] OR [address] [place] [address];  Service: Transplant;  Laterality: N/A;    LAPAROTOMY, EXPLORATORY, AFTER LIVER TRANSPLANT N/A 10/6/2018    Performed by Benson Matson MD at [facility] OR [address] [place] [address]    LIVER TRANSPLANT N/A 10/4/2018    Procedure: TRANSPLANT, LIVER;  Surgeon: Yony Burns MD;  Location: [facility] OR [address] [place] [address];  Service: Transplant;  Laterality: N/A;    TRANSPLANT, LIVER N/A 10/4/2018    Performed by Yony Burns MD at [facility] OR [address] [place] [address]       Review of patient's allergies indicates:  No Known Allergies  Current Facility-Administered Medications   Medication Frequency    bisacodyl suppository 10 mg Daily PRN    calcium carbonate 200 mg calcium (500 mg) chewable tablet 500 mg TID PRN    dextrose 50% injection 12.5 g PRN    dextrose 50% injection 25 g PRN    entecavir tablet 0.5 mg Q48H    ergocalciferol capsule 50,000 Units Q7 Days    fluconazole tablet 200 mg Daily     glucagon (human recombinant) injection 1 mg PRN    glucose chewable tablet 16 g PRN    glucose chewable tablet 24 g PRN    heparin (porcine) injection 5,000 Units Q8H    insulin aspart U-100 pen 0-4 Units PRN    insulin aspart U-100 pen 2 Units TID PRN    insulin aspart U-100 pen 3-5 Units TIDWM    insulin regular (Humulin R) 100 Units in sodium chloride 0.9% 100 mL infusion Continuous    lidocaine HCL 10 mg/ml (1%) injection 10 mL Once    midodrine tablet 15 mg TID    mirtazapine tablet 7.5 mg QHS    mycophenolate capsule 1,000 mg BID    ondansetron disintegrating tablet 8 mg Q8H PRN    oxyCODONE immediate release tablet 5 mg Q4H PRN    oxyCODONE immediate release tablet Tab 10 mg Q4H PRN    pantoprazole EC tablet 40 mg Daily    polyethylene glycol packet 17 g Daily    predniSONE tablet 20 mg Daily    promethazine (PHENERGAN) 6.25 mg in dextrose 5 % 50 mL IVPB Q6H PRN    sodium bicarbonate tablet 1,300 mg QID    sodium chloride 0.9% flush 3 mL PRN    sulfamethoxazole-trimethoprim 400-80mg per tablet 1 tablet Every Mon, Wed, Fri    ursodiol capsule 300 mg BID    [START ON 10/15/2018] valGANciclovir tablet 450 mg Daily     Family History     None        Tobacco Use    Smoking status: Never Smoker    Smokeless tobacco: Never Used   Substance and Sexual Activity    Alcohol use: No     Frequency: Never    Drug use: No    Sexual activity: Not on file     Review of Systems   Constitutional: Negative for chills, fatigue and fever.   Respiratory: Negative for cough and shortness of breath.    Cardiovascular: Positive for leg swelling. Negative for chest pain.   Gastrointestinal: Positive for abdominal distention. Negative for abdominal pain, constipation, diarrhea and vomiting.   Genitourinary: Negative for decreased urine volume, difficulty urinating and dysuria.   Musculoskeletal: Negative for arthralgias and back pain.   Allergic/Immunologic: Positive for immunocompromised state.    Neurological: Negative for tremors and headaches.      Objective:     Vital Signs (Most Recent):  Temp: 97.6 °F (36.4 °C) (10/11/18 1238)  Pulse: 98 (10/11/18 1140)  Resp: 16 (10/11/18 0000)  BP: 124/63 (10/11/18 0000)  SpO2: 100 % (10/11/18 0000)  O2 Device (Oxygen Therapy): room air (10/10/18 1253) Vital Signs (24h Range):  Temp:  [97.5 °F (36.4 °C)-97.6 °F (36.4 °C)] 97.6 °F (36.4 °C)  Pulse:  [] 98  Resp:  [15-16] 16  SpO2:  [100 %] 100 %  BP: (111-124)/(63-74) 124/63     Weight: 65 kg (143 lb 6.3 oz) (10/11/18 0552)  Body mass index is 25.4 kg/m².  Body surface area is 1.7 meters squared.    I/O last 3 completed shifts:  In: 1891.4 [P.O.:1140; I.V.:751.4]  Out: 1060 [Urine:720; Drains:340]    Physical Exam   Constitutional: She is oriented to person, place, and time. She appears well-developed.   Temporal and distal extremity muscle wasting   Eyes: EOM are normal. Pupils are equal, round, and reactive to light. Scleral icterus is present.   Neck: Normal range of motion.   Cardiovascular: Normal rate and regular rhythm.   No murmur heard.  Pulmonary/Chest: Effort normal and breath sounds normal. No respiratory distress. She has no wheezes.   Abdominal: Soft. Bowel sounds are normal. She exhibits distension. There is no tenderness.   Chevron incision w/ staples, CDI     Musculoskeletal: Normal range of motion. She exhibits edema (BL lower extremities).   Neurological: She is alert and oriented to person, place, and time.   Skin: Skin is warm and dry.   Nursing note and vitals reviewed.     Significant Labs:  CBC:   Recent Labs   Lab  10/11/18   0600   WBC  6.19   RBC  2.79*   HGB  8.5*   HCT  24.0*   PLT  55*   MCV  86   MCH  30.5   MCHC  35.4     CMP:   Recent Labs   Lab  10/11/18   0600   GLU  212*   CALCIUM  8.5*   ALBUMIN  2.9*   PROT  4.0*   NA  133*   K  3.6   CO2  20*   CL  100   BUN  91*   CREATININE  1.9*   ALKPHOS  139*   ALT  110*   AST  48*   BILITOT  10.5*     All labs within the past 24  hours have been reviewed.      Assessment/Plan:      Post LT - Acute renal failure    Post-LT EVA is multifactorial in origin and possibly related to the severity of liver disease, toxicity of immunosuppressive therapy and hepatic ischaemia reperfusion injury might be a driving force in the aetiology of post-LT EVA.      - acute gradual worsening of renal function s/p OLT  - 10/06 was taken to the OR for possibility of billiary leak and post op complication due to hypercapnic hypoxic respiratory failure, no blood pressure drop intraoperatively per anesthesiology notes. Patient has not been needing vasopressors to maintain BP  - Creatinine on arrival 0.8 and has been up trending   - BUN/cr (10/11/18): 91/1.9   Tacrolimus 26.9 (10/08)      Assessment: Acute Renal failure in setting of hepatic ischemia reperfusion injury and poor perfusion status vs acute tubular necrosis from immunosuppressive agents      Plan:  - will get repeat UA and microscopic analysis  - will get urine lytes Fang, Uk, Ucl, Uosm  - Protein /creatinine ratio  - spin urine to evaluate ATN  - renal ultrasound to assess hydronephrosis  - avoid nephrotoxic drugs, ACEI/ARB, NSAIDS  - adjust drugs to GFR: bactrim MWF  - Repeat tacrolimus levels and consider renal adjustment  - Acute RRT not indicated at this point  - will reassess in am with repeat RFT's and electrolytes           Thank you for your consult. I will follow-up with patient. Please contact us if you have any additional questions.    Darrel Adhikari MD   Internal Medicine PGY II  Pager: [phone]  Nephrology  [facility]

## 2018-10-11 NOTE — PLAN OF CARE
Pt AAOx4, VSS, afebrile. Pt c/o nausea but not actively vomiting; PRN PO zofran given.  Pt c/o heartburn; NP notified and ordered Tums.  Pt caregiver continues to question self-meds; education reinforcement needed.  Fall risk precautions initiated.  Pt in lowest bed position setting, lighting adjusted, pt to wear nonskid socks when ambulating, side rails up x2.  Pt family at bedside.  Pt remain free from falls during shift.  Call light within reach. Will continue to monitor.

## 2018-10-11 NOTE — PLAN OF CARE
Problem: Patient Care Overview  Goal: Plan of Care Review  Outcome: Ongoing (interventions implemented as appropriate)  VSS. Call light and personal items in reach. Pt had a 2D echo and a U/S of the abdomen. The  met w/ the Pt, her family and pharmacy. Family's medicine questions were answered. Pt BS remained in the lower 200's. Her transitional iv insulin was increased to 1.8 units/hr. No other issues at this time. Will continue to monitor pt.

## 2018-10-12 LAB
ALBUMIN SERPL BCP-MCNC: 2.5 G/DL
ALP SERPL-CCNC: 147 U/L
ALT SERPL W/O P-5'-P-CCNC: 90 U/L
ANION GAP SERPL CALC-SCNC: 12 MMOL/L
AORTIC VALVE REGURGITATION: ABNORMAL
APTT BLDCRRT: 41.5 SEC
AST SERPL-CCNC: 43 U/L
BACTERIA SPEC ANAEROBE CULT: NORMAL
BASOPHILS # BLD AUTO: 0 K/UL
BASOPHILS NFR BLD: 0 %
BILIRUB SERPL-MCNC: 8 MG/DL
BUN SERPL-MCNC: 100 MG/DL
CALCIUM SERPL-MCNC: 8.2 MG/DL
CHLORIDE SERPL-SCNC: 98 MMOL/L
CO2 SERPL-SCNC: 21 MMOL/L
CREAT SERPL-MCNC: 2 MG/DL
DIASTOLIC DYSFUNCTION: YES
DIFFERENTIAL METHOD: ABNORMAL
EOSINOPHIL # BLD AUTO: 0 K/UL
EOSINOPHIL NFR BLD: 0.1 %
ERYTHROCYTE [DISTWIDTH] IN BLOOD BY AUTOMATED COUNT: 17 %
EST. GFR  (AFRICAN AMERICAN): 28.7 ML/MIN/1.73 M^2
EST. GFR  (NON AFRICAN AMERICAN): 24.9 ML/MIN/1.73 M^2
GLUCOSE SERPL-MCNC: 70 MG/DL
HCT VFR BLD AUTO: 24.3 %
HGB BLD-MCNC: 8.5 G/DL
IMM GRANULOCYTES # BLD AUTO: 0.1 K/UL
IMM GRANULOCYTES NFR BLD AUTO: 1.3 %
INR PPP: 1.2
LYMPHOCYTES # BLD AUTO: 0.3 K/UL
LYMPHOCYTES NFR BLD: 4.4 %
MAGNESIUM SERPL-MCNC: 1.7 MG/DL
MCH RBC QN AUTO: 30.1 PG
MCHC RBC AUTO-ENTMCNC: 35 G/DL
MCV RBC AUTO: 86 FL
MITRAL VALVE MOBILITY: NORMAL
MITRAL VALVE REGURGITATION: ABNORMAL
MONOCYTES # BLD AUTO: 0.6 K/UL
MONOCYTES NFR BLD: 7.8 %
NEUTROPHILS # BLD AUTO: 6.7 K/UL
NEUTROPHILS NFR BLD: 86.4 %
NRBC BLD-RTO: 0 /100 WBC
PHOSPHATE SERPL-MCNC: 4.7 MG/DL
PLATELET # BLD AUTO: 68 K/UL
PMV BLD AUTO: 11.1 FL
POCT GLUCOSE: 153 MG/DL (ref 70–110)
POCT GLUCOSE: 225 MG/DL (ref 70–110)
POCT GLUCOSE: 272 MG/DL (ref 70–110)
POCT GLUCOSE: 283 MG/DL (ref 70–110)
POCT GLUCOSE: 75 MG/DL (ref 70–110)
POTASSIUM SERPL-SCNC: 3.8 MMOL/L
PROT SERPL-MCNC: 3.7 G/DL
PROTHROMBIN TIME: 12 SEC
RBC # BLD AUTO: 2.82 M/UL
RETIRED EF AND QEF - SEE NOTES: 75 (ref 55–65)
SODIUM SERPL-SCNC: 131 MMOL/L
TACROLIMUS BLD-MCNC: 6.7 NG/ML
TRICUSPID VALVE REGURGITATION: ABNORMAL
WBC # BLD AUTO: 7.74 K/UL

## 2018-10-12 PROCEDURE — 63600175 PHARM REV CODE 636 W HCPCS: Performed by: PHYSICIAN ASSISTANT

## 2018-10-12 PROCEDURE — 25000003 PHARM REV CODE 250: Performed by: TRANSPLANT SURGERY

## 2018-10-12 PROCEDURE — 99233 SBSQ HOSP IP/OBS HIGH 50: CPT | Mod: 24,,, | Performed by: PHYSICIAN ASSISTANT

## 2018-10-12 PROCEDURE — 80053 COMPREHEN METABOLIC PANEL: CPT

## 2018-10-12 PROCEDURE — 25000003 PHARM REV CODE 250: Performed by: PHYSICIAN ASSISTANT

## 2018-10-12 PROCEDURE — 25000003 PHARM REV CODE 250: Performed by: NURSE PRACTITIONER

## 2018-10-12 PROCEDURE — 85610 PROTHROMBIN TIME: CPT

## 2018-10-12 PROCEDURE — 20600001 HC STEP DOWN PRIVATE ROOM

## 2018-10-12 PROCEDURE — 85025 COMPLETE CBC W/AUTO DIFF WBC: CPT

## 2018-10-12 PROCEDURE — 85730 THROMBOPLASTIN TIME PARTIAL: CPT

## 2018-10-12 PROCEDURE — 83735 ASSAY OF MAGNESIUM: CPT

## 2018-10-12 PROCEDURE — 99232 SBSQ HOSP IP/OBS MODERATE 35: CPT | Mod: ,,, | Performed by: NURSE PRACTITIONER

## 2018-10-12 PROCEDURE — 63600175 PHARM REV CODE 636 W HCPCS: Performed by: SURGERY

## 2018-10-12 PROCEDURE — 84100 ASSAY OF PHOSPHORUS: CPT

## 2018-10-12 PROCEDURE — 94664 DEMO&/EVAL PT USE INHALER: CPT

## 2018-10-12 PROCEDURE — 63600175 PHARM REV CODE 636 W HCPCS: Performed by: STUDENT IN AN ORGANIZED HEALTH CARE EDUCATION/TRAINING PROGRAM

## 2018-10-12 PROCEDURE — 93306 TTE W/DOPPLER COMPLETE: CPT

## 2018-10-12 PROCEDURE — 63600175 PHARM REV CODE 636 W HCPCS: Performed by: NURSE PRACTITIONER

## 2018-10-12 PROCEDURE — 93306 TTE W/DOPPLER COMPLETE: CPT | Mod: 26,,, | Performed by: INTERNAL MEDICINE

## 2018-10-12 PROCEDURE — 25000003 PHARM REV CODE 250: Performed by: STUDENT IN AN ORGANIZED HEALTH CARE EDUCATION/TRAINING PROGRAM

## 2018-10-12 PROCEDURE — 80197 ASSAY OF TACROLIMUS: CPT

## 2018-10-12 PROCEDURE — 99900035 HC TECH TIME PER 15 MIN (STAT)

## 2018-10-12 RX ORDER — INSULIN ASPART 100 [IU]/ML
9 INJECTION, SOLUTION INTRAVENOUS; SUBCUTANEOUS
Status: DISCONTINUED | OUTPATIENT
Start: 2018-10-12 | End: 2018-10-14

## 2018-10-12 RX ORDER — FUROSEMIDE 10 MG/ML
100 INJECTION INTRAMUSCULAR; INTRAVENOUS ONCE
Status: COMPLETED | OUTPATIENT
Start: 2018-10-12 | End: 2018-10-12

## 2018-10-12 RX ORDER — INSULIN ASPART 100 [IU]/ML
7 INJECTION, SOLUTION INTRAVENOUS; SUBCUTANEOUS
Status: DISCONTINUED | OUTPATIENT
Start: 2018-10-12 | End: 2018-10-12

## 2018-10-12 RX ORDER — POLYETHYLENE GLYCOL 3350 17 G/17G
17 POWDER, FOR SOLUTION ORAL DAILY PRN
Status: DISCONTINUED | OUTPATIENT
Start: 2018-10-12 | End: 2018-12-21 | Stop reason: HOSPADM

## 2018-10-12 RX ORDER — CETIRIZINE HYDROCHLORIDE 5 MG/1
5 TABLET ORAL DAILY
Status: DISCONTINUED | OUTPATIENT
Start: 2018-10-12 | End: 2018-10-12

## 2018-10-12 RX ADMIN — HEPARIN SODIUM 5000 UNITS: 5000 INJECTION, SOLUTION INTRAVENOUS; SUBCUTANEOUS at 08:10

## 2018-10-12 RX ADMIN — MIDODRINE HYDROCHLORIDE 15 MG: 5 TABLET ORAL at 01:10

## 2018-10-12 RX ADMIN — INSULIN ASPART 7 UNITS: 100 INJECTION, SOLUTION INTRAVENOUS; SUBCUTANEOUS at 05:10

## 2018-10-12 RX ADMIN — MIDODRINE HYDROCHLORIDE 15 MG: 5 TABLET ORAL at 08:10

## 2018-10-12 RX ADMIN — PREDNISONE 20 MG: 10 TABLET ORAL at 08:10

## 2018-10-12 RX ADMIN — OXYCODONE HYDROCHLORIDE 10 MG: 10 TABLET ORAL at 12:10

## 2018-10-12 RX ADMIN — SULFAMETHOXAZOLE AND TRIMETHOPRIM 1 TABLET: 400; 80 TABLET ORAL at 08:10

## 2018-10-12 RX ADMIN — SODIUM BICARBONATE 650 MG TABLET 1300 MG: at 08:10

## 2018-10-12 RX ADMIN — SODIUM BICARBONATE 650 MG TABLET 1300 MG: at 05:10

## 2018-10-12 RX ADMIN — URSODIOL 300 MG: 300 CAPSULE ORAL at 08:10

## 2018-10-12 RX ADMIN — INSULIN ASPART 2 UNITS: 100 INJECTION, SOLUTION INTRAVENOUS; SUBCUTANEOUS at 05:10

## 2018-10-12 RX ADMIN — PANTOPRAZOLE SODIUM 40 MG: 40 TABLET, DELAYED RELEASE ORAL at 08:10

## 2018-10-12 RX ADMIN — MIRTAZAPINE 7.5 MG: 7.5 TABLET ORAL at 08:10

## 2018-10-12 RX ADMIN — INSULIN ASPART 7 UNITS: 100 INJECTION, SOLUTION INTRAVENOUS; SUBCUTANEOUS at 12:10

## 2018-10-12 RX ADMIN — FUROSEMIDE 100 MG: 10 INJECTION, SOLUTION INTRAMUSCULAR; INTRAVENOUS at 11:10

## 2018-10-12 RX ADMIN — HEPARIN SODIUM 5000 UNITS: 5000 INJECTION, SOLUTION INTRAVENOUS; SUBCUTANEOUS at 06:10

## 2018-10-12 RX ADMIN — FLUCONAZOLE 200 MG: 200 TABLET ORAL at 08:10

## 2018-10-12 RX ADMIN — INSULIN ASPART 2 UNITS: 100 INJECTION, SOLUTION INTRAVENOUS; SUBCUTANEOUS at 08:10

## 2018-10-12 RX ADMIN — SODIUM BICARBONATE 650 MG TABLET 1300 MG: at 01:10

## 2018-10-12 RX ADMIN — TACROLIMUS 0.5 MG: 0.5 CAPSULE ORAL at 08:10

## 2018-10-12 RX ADMIN — HEPARIN SODIUM 5000 UNITS: 5000 INJECTION, SOLUTION INTRAVENOUS; SUBCUTANEOUS at 01:10

## 2018-10-12 RX ADMIN — MYCOPHENOLATE MOFETIL 1000 MG: 250 CAPSULE ORAL at 08:10

## 2018-10-12 RX ADMIN — ENTECAVIR 0.5 MG: 0.5 TABLET ORAL at 08:10

## 2018-10-12 RX ADMIN — INSULIN ASPART 3 UNITS: 100 INJECTION, SOLUTION INTRAVENOUS; SUBCUTANEOUS at 05:10

## 2018-10-12 NOTE — SUBJECTIVE & OBJECTIVE
Interval History: patient reports not feeling well this am, reports fatigue, malaise, weakness, and lower back and lower extremity swelling.      Review of patient's allergies indicates:  No Known Allergies  Current Facility-Administered Medications   Medication Frequency    bisacodyl suppository 10 mg Daily PRN    calcium carbonate 200 mg calcium (500 mg) chewable tablet 500 mg TID PRN    dextrose 50% injection 12.5 g PRN    dextrose 50% injection 25 g PRN    entecavir tablet 0.5 mg Q48H    ergocalciferol capsule 50,000 Units Q7 Days    fluconazole tablet 200 mg Daily    glucagon (human recombinant) injection 1 mg PRN    glucose chewable tablet 16 g PRN    glucose chewable tablet 24 g PRN    heparin (porcine) injection 5,000 Units Q8H    insulin aspart U-100 pen 0-4 Units PRN    insulin aspart U-100 pen 2 Units TID PRN    insulin aspart U-100 pen 7 Units TIDWM    insulin regular (Humulin R) 100 Units in sodium chloride 0.9% 100 mL infusion Continuous    midodrine tablet 15 mg TID    mirtazapine tablet 7.5 mg QHS    mycophenolate capsule 1,000 mg BID    ondansetron disintegrating tablet 8 mg Q8H PRN    oxyCODONE immediate release tablet 5 mg Q4H PRN    oxyCODONE immediate release tablet Tab 10 mg Q4H PRN    pantoprazole EC tablet 40 mg Daily    polyethylene glycol packet 17 g Daily PRN    predniSONE tablet 20 mg Daily    promethazine (PHENERGAN) 6.25 mg in dextrose 5 % 50 mL IVPB Q6H PRN    sodium bicarbonate tablet 1,300 mg QID    sodium chloride 0.9% flush 3 mL PRN    sulfamethoxazole-trimethoprim 400-80mg per tablet 1 tablet Every Mon, Wed, Fri    tacrolimus capsule 0.5 mg Daily    ursodiol capsule 300 mg BID    [START ON 10/15/2018] valGANciclovir tablet 450 mg Daily     Review of Systems   Constitutional: Negative for chills, fatigue and fever.   Respiratory: Negative for cough and shortness of breath.    Cardiovascular: Positive for leg swelling. Negative for chest pain.    Gastrointestinal: Positive for abdominal distention. Negative for abdominal pain, constipation, diarrhea and vomiting.   Genitourinary: Negative for decreased urine volume, difficulty urinating and dysuria.   Musculoskeletal: Negative for arthralgias and back pain.   Allergic/Immunologic: Positive for immunocompromised state.   Neurological: Negative for tremors and headaches.      Objective:     Vital Signs (Most Recent):  Temp: 96.7 °F (35.9 °C) (10/11/18 1930)  Pulse: 96 (10/12/18 1514)  Resp: 19 (10/12/18 1405)  BP: (!) 113/54 (10/12/18 0945)  SpO2: 100 % (10/12/18 1130)  O2 Device (Oxygen Therapy): room air (10/11/18 1731) Vital Signs (24h Range):  Temp:  [96.7 °F (35.9 °C)-97.6 °F (36.4 °C)] 96.7 °F (35.9 °C)  Pulse:  [] 96  Resp:  [8-19] 19  SpO2:  [98 %-100 %] 100 %  BP: (109-143)/(52-72) 113/54     Weight: 66.7 kg (146 lb 15.9 oz) (10/12/18 0600)  Body mass index is 26.04 kg/m².  Body surface area is 1.72 meters squared.    I/O last 3 completed shifts:  In: 1984.1 [P.O.:1910; I.V.:74.1]  Out: 520 [Urine:520]    Physical Exam  Constitutional: She is oriented to person, place, and time. She appears well-developed.   Temporal and distal extremity muscle wasting; yellowing of skin  Eyes: EOM are normal. Pupils are equal, round, and reactive to light. Scleral icterus is present.   Neck: Normal range of motion.   Cardiovascular: Normal rate and regular rhythm.   No murmur heard.  Pulmonary/Chest: Effort normal and breath sounds normal. No respiratory distress. She has no wheezes.   Abdominal: Soft. Bowel sounds are normal. She exhibits distension. There is no tenderness.   Chevron incision w/ staples, CDI  Musculoskeletal: Normal range of motion. She exhibits edema (BL lower extremities).   Neurological: She is alert and oriented to person, place, and time.   Skin: Skin is warm and dry.   Nursing note and vitals reviewed.    Significant Labs:  CBC:   Recent Labs   Lab  10/12/18   0600   WBC  7.74   RBC   2.82*   HGB  8.5*   HCT  24.3*   PLT  68*   MCV  86   MCH  30.1   MCHC  35.0     CMP:   Recent Labs   Lab  10/12/18   0600   GLU  70   CALCIUM  8.2*   ALBUMIN  2.5*   PROT  3.7*   NA  131*   K  3.8   CO2  21*   CL  98   BUN  100*   CREATININE  2.0*   ALKPHOS  147*   ALT  90*   AST  43*   BILITOT  8.0*     Recent Labs   Lab  10/09/18   1130   COLORU  Stacy   SPECGRAV  1.020   PHUR  5.0   PROTEINUA  1+*   BACTERIA  None   NITRITE  Negative   LEUKOCYTESUR  Negative   UROBILINOGEN  Negative   HYALINECASTS  0     All labs within the past 24 hours have been reviewed.     Significant Imaging:    10/11/2018 US Retroperitoneal:     FINDINGS:  Right kidney: Normal in size measuring 9.8 cm. Increased cortical echogenicity.  No loss of corticomedullary distinction. Resistive index measures 0.78.  No mass. No renal stone. No hydronephrosis.    Left kidney: Normal in size measuring 10.0 cm. Increased cortical echogenicity.  No loss of corticomedullary distinction. Resistive index measures 0.77.  No mass. No renal stone. No hydronephrosis.    The bladder is partially distended at the time of scanning and has an unremarkable appearance.    Mild volume ascites.      Impression       Upper normal to mildly elevated renal resistive indices suggestive of medical renal disease.    Ascites.

## 2018-10-12 NOTE — ASSESSMENT & PLAN NOTE
Creatinine on arrival 0.8 and has been up trending   BUN/cr (10/11/18): 91/1.9   Tacrolimus 26.9 (10/08)  Nephrology consulted-- no RRT recommended at this time  Renal US (10/11): no hydronephrosis  Echo 10/12: diastolic dysfunction, likely 2/2 to ARF  CVP 7  On low K diet

## 2018-10-12 NOTE — PROGRESS NOTES
This rn was attempting to give meds and explain to daughter via google  that transport at bedside to take pt to ECHO.  She seemed to understand communication. Yet transport at bedside for over an hour.  Test cancelled.  Jihan ALLISON infomed.  Will attempt echo later in day, possibly even at bedside to help aid in translation issues

## 2018-10-12 NOTE — PROGRESS NOTES
"[facility]  Endocrinology  Progress Note    Admit Date: 10/1/2018     Reason for Consult: Management of type 2 DM, Hyperglycemia      Surgical Procedure and Date: Liver transplant 10/5/18, Ex lap 10/6/18     Diabetes diagnosis year:      Home Diabetes Medications:  Lantus 18 units HS and novolog 17 units with meals plus correction scale (150-200 +1)        Lab Results   Component Value Date     HGBA1C 6.8 (H) 2018        How often checking glucose at home? 3-4   BG readings on regimen: 120-150.  Post prandial readings as high as upper 200s  Hypoglycemia on the regimen? yes, none recently   Missed doses on regimen?  No     Diabetes Complications include:     Diabetic peripheral neuropathy      Complicating diabetes co morbidities:   CIRRHOSIS        HPI:  Patient is a 68 y.o. female with a diagnosis of ESLD, listed for liver transplant with meld 23 who underwent live transplant on 10/5/18.  Back to OR on 10/6/18 for ex lap.  Admitted 10/1/18 for hyponatremia s/p paracentesis. She speaks primarily Czech. Information obtained from recent past admission and discussion over the phone via  ([employer]). Family not at bedside. Pt request family at bedside for future conversations. Personal has known diagnosis of diabetes, endocrine consulted for diabetes management.        Interval HPI:   Overnight events: remains in TSU. BG at goal overnight but FBG below goal this AM. Insulin infusion at 1.8 u/hr overnight and suspended this AM per orders. Prednisone 20 mg daily.   Eatin%- eats breakfast and then tends to graze throughout day rather than eating 3 meals.   Nausea: No  Hypoglycemia and intervention: No  Fever: No  TPN and/or TF: No      /72   Pulse 75   Temp 96.7 °F (35.9 °C) (Axillary)   Resp 10   Ht 5' 3" (1.6 m)   Wt 65 kg (143 lb 6.3 oz)   LMP  (LMP Unknown)   SpO2 100%   Breastfeeding? No   BMI 25.40 kg/m²      Labs Reviewed and Include    Recent Labs   Lab  " 10/11/18   0600   GLU  212*   CALCIUM  8.5*   ALBUMIN  2.9*   PROT  4.0*   NA  133*   K  3.6   CO2  20*   CL  100   BUN  91*   CREATININE  1.9*   ALKPHOS  139*   ALT  110*   AST  48*   BILITOT  10.5*     Lab Results   Component Value Date    WBC 6.19 10/11/2018    HGB 8.5 (L) 10/11/2018    HCT 24.0 (L) 10/11/2018    MCV 86 10/11/2018    PLT 55 (L) 10/11/2018     No results for input(s): TSH, FREET4 in the last 168 hours.  Lab Results   Component Value Date    HGBA1C 6.0 (H) 10/04/2018       Nutritional status:   Body mass index is 25.4 kg/m².  Lab Results   Component Value Date    ALBUMIN 2.9 (L) 10/11/2018    ALBUMIN 3.1 (L) 10/10/2018    ALBUMIN 2.5 (L) 10/09/2018     Lab Results   Component Value Date    PREALBUMIN 12 (L) 09/17/2018       Estimated Creatinine Clearance: 25.3 mL/min (A) (based on SCr of 1.9 mg/dL (H)).    Accu-Checks  Recent Labs      10/10/18   0237  10/10/18   0814  10/10/18   1334  10/10/18   1814  10/10/18   2053  10/11/18   0901  10/11/18   1248  10/11/18   1413  10/11/18   1852  10/11/18   2155   POCTGLUCOSE  91  230*  267*  216*  216*  226*  220*  251*  235*  179*       Current Medications and/or Treatments Impacting Glycemic Control  Immunotherapy:    Immunosuppressants         Stop Route Frequency     tacrolimus capsule 0.5 mg      -- Oral Daily     mycophenolate capsule 1,000 mg      -- Oral 2 times daily        Steroids:   Hormones (From admission, onward)    Start     Stop Route Frequency Ordered    10/11/18 0900  predniSONE tablet 20 mg      10/18 0859 Oral Daily 10/10/18 0829        Pressors:    Autonomic Drugs (From admission, onward)    Start     Stop Route Frequency Ordered    10/10/18 1500  midodrine tablet 15 mg      -- Oral 3 times daily 10/10/18 1122    10/06/18 1617  rocuronium (ZEMURON) 10 mg/mL injection     Comments:  Created by cabinet override    10/07 0429   10/06/18 9437        Hyperglycemia/Diabetes Medications:   Antihyperglycemics (From admission, onward)    Start      Stop Route Frequency Ordered    10/11/18 1645  insulin aspart U-100 pen 6 Units      -- SubQ 3 times daily with meals 10/11/18 1519    10/09/18 1535  insulin aspart U-100 pen 2 Units      -- SubQ 3 times daily PRN 10/09/18 1436    10/07/18 1200  insulin regular (Humulin R) 100 Units in sodium chloride 0.9% 100 mL infusion      -- IV Continuous 10/07/18 1055    10/07/18 1155  insulin aspart U-100 pen 0-4 Units      -- SubQ As needed (PRN) 10/07/18 1055          ASSESSMENT and PLAN    * Liver transplanted    Managed per primary team  Avoid hypoglycemia    Recent Labs   Lab  10/12/18   0600   ALT  90*   AST  43*   ALKPHOS  147*   BILITOT  8.0*   PROT  3.7*   ALBUMIN  2.5*               Type 2 diabetes mellitus with diabetic polyneuropathy, with long-term current use of insulin    BG goal 140 - 180     restart transition IV insulin drip at  0.8  u/hr  Continue Novolog 7 units with meals   2 units PRN protein shake  Low dose correction scale insulin  BG monitoring AC/HS/0200    Will try to convert to SQ tomorrow.         Discharge planning:  TBD        Adverse effect of corticosteroids    On standard steroid taper per transplant team; may elevate BG readings            EVA (acute kidney injury)    Avoid insulin stacking and hypoglycemia.  Lab Results   Component Value Date    CREATININE 2.0 (H) 10/12/2018               Prophylactic immunotherapy    May increase insulin resistance.           Severe malnutrition    Oral intake encouraged, may affect BG readings            Aviva Caldera NP  Endocrinology  [facility]

## 2018-10-12 NOTE — PT/OT/SLP PROGRESS
Physical Therapy      Patient Name:  Scarlett Reddy   MRN:  [id number]    Patient not seen today secondary to (Attempted this AM, but pt declined 2* fatigue, nausea, and general malaise. PT unable to return for later attempt.). Will follow-up at next scheduled session as able.    Radha Nguyen, PT, DPT   10/12/2018  [phone]

## 2018-10-12 NOTE — PROGRESS NOTES
[facility]  Liver Transplant  Progress Note    Patient Name: Scarlett Reddy  MRN: [id number]  Admission Date: 10/1/2018  Hospital Length of Stay: 10 days  Code Status: Full Code  Primary Care Provider: Primary Doctor No  Post-Operative Day: 7    ORGAN:   LIVER  Disease Etiology: Cirrhosis: Type B and D  Donor Type:    - Brain Death  CDC High Risk:   No  Donor CMV Status:   Donor CMV Status: Positive  Donor HBcAB:   Negative  Donor HCV Status:   Negative  Donor HBV SETH: Negative  Donor HCV SETH: Negative  Whole or Partial: Whole Liver  Biliary Anastomosis: End to End  Arterial Anatomy: Replaced Left Hepatic and Right Hepatic  Subjective:     History of Present Illness:  Ms. Reddy is a 67 y/o female with PMH of ESLD secondary Hep B and D.  Listed for liver transplant with MELD 23.  Paracentesis 10/1 with 5L removed, no fluid sent for analysis.  Morning labs significant for hyponatremia, Na 119.  Pt admitted to LTS for sodium monitoring.        Hospital Course:  Hospital Course: 67 y/o F h/o HBV/delta cirrhosis s/p DBDLT 10/5/18 (CMV D+/R+, steroid induction, MMF/tacro/pred maintenance) with take back on 10/6 2/2 hyperbilirubinema, no leak identified. Post-op course c/b hypercapneic and hypoxic respiratory failure and was reintubated though quickly extubated now doing well. Liver bx (10/6) sig for Zone 3 hemorrhage and collapse, in addition to cholestasis. Transferred from ICU on POD #4. ID consulted to comment on positive diphtheroid culture from ascitic fluid (likely skin colonization) as well as ESBL Klebsiella pneumoniae in urine culture (10/4).  Pt asymptomatic and cell count from ascitic fluid unremarkable. Per ID recs, no need to treat diphtheroids or asymptomatic ESBL Kleb pneumo bacteriuria though pt has had 6 days of therapy which would cover these organisms. Mild peritransplant ascites- repeat US 10/6 with increased arterial resistive indices. Repeat US 10/9 w/ continued elevation in  RIs and fluid collections. LFTs trending down nicely. Acute gradual worsening of renal function s/p OLT.Creatinine on arrival 0.8 and has been up trending. Nephrology consulted for post-op EVA.     Interval history: Pt not feeling well this morning, reports fatigue, malaise, weakness, and lower back and abdominal pain. Per daughter, pt urinated on herself unknowingly. Cr continues to trend up. Remains hypervolemic. CVP 7. Per recs, acute RRT not needed at this time. Spoke w/ nephrology, diuresed w/ Lasix 100mg IV. Suspected urinary retention due to pt's continued abd and back pains. Planned for in/out cath. Assessed pt, had additional urinary output. Will hold off at this time and reassess in the morning. Renal US (10/11) with no evidence of hydronephrosis. Bedside Echo w/ findings of diastolic dysfunction, likely 2/2 ARF. Will cont to monitor.      No new subjective & objective note has been filed under this hospital service since the last note was generated.    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   -POD 1 US: increased arterial resistive indices, suggestive of edema vs cute rejection vs congestion  -Repeat US 10/9 with continued elevation of RIs  -LFTs trending down  -T bili trending down  -repeat liver US (10/11): elevated RIs           Post LT - Acute renal failure    Creatinine on arrival 0.8 and has been up trending   BUN/cr (10/11/18): 91/1.9   Tacrolimus 26.9 (10/08)  Nephrology consulted-- no RRT recommended at this time  Renal US (10/11): no hydronephrosis  Echo 10/12: diastolic dysfunction, likely 2/2 to ARF  CVP 7  On low K diet          Hypervolemia associated with renal insufficiency    Diuresed w/ Lasix 100mg x 1 (10/12)          EVA (acute kidney injury)              Metabolic acidosis    Started Na bicarb 1300 QID           At risk for opportunistic infections    -Valcyte for CMV prophylaxis --> will start at discharge or  POD #10 in hospital  - Bactrim for PCP prophylaxis (MWF)  -Fluconazole for fungal prophylaxis           Long-term use of immunosuppressant medication    Cellcept/Prograf/pred  Held prograf 10/8 for elevated levels  Resumed Prograf 10/10 at dose of 0.5mg   Will monitor for signs of toxic side effects, check daily tacrolimus troughs, and change meds accordingly.          Prophylactic immunotherapy    See long term use immunosuppresion          Ascites    - paracentesis 10/1, 5L removed, no fluid sent for analysis.  - diuresing with albumin and lasix 10/9           Physical deconditioning    -PT/OT consulted.  -Recommend home health PT and rolling walker at discharge           Hyponatremia    - Na 119 on admit.  -Na improving post transplant                  Hypotension    - continue Midodrine 15 mg TID.           Severe malnutrition    - dietary consulted.   -temporal and distal extremity muscle wasting and edema  -supplements ordered          Type 2 diabetes mellitus with diabetic polyneuropathy, with long-term current use of insulin    - continue home regimen.  - endocrine consulted.  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      Jihan Soares PA-C  Liver Transplant  [facility]

## 2018-10-12 NOTE — SUBJECTIVE & OBJECTIVE
"Interval HPI:   Overnight events: remains in TSU. BG at goal overnight but FBG below goal this AM. Insulin infusion at 1.8 u/hr overnight and suspended this AM per orders. Prednisone 20 mg daily.   Eatin%- eats breakfast and then tends to graze throughout day rather than eating 3 meals.   Nausea: No  Hypoglycemia and intervention: No  Fever: No  TPN and/or TF: No      /72   Pulse 75   Temp 96.7 °F (35.9 °C) (Axillary)   Resp 10   Ht 5' 3" (1.6 m)   Wt 65 kg (143 lb 6.3 oz)   LMP  (LMP Unknown)   SpO2 100%   Breastfeeding? No   BMI 25.40 kg/m²     Labs Reviewed and Include    Recent Labs   Lab  10/11/18   0600   GLU  212*   CALCIUM  8.5*   ALBUMIN  2.9*   PROT  4.0*   NA  133*   K  3.6   CO2  20*   CL  100   BUN  91*   CREATININE  1.9*   ALKPHOS  139*   ALT  110*   AST  48*   BILITOT  10.5*     Lab Results   Component Value Date    WBC 6.19 10/11/2018    HGB 8.5 (L) 10/11/2018    HCT 24.0 (L) 10/11/2018    MCV 86 10/11/2018    PLT 55 (L) 10/11/2018     No results for input(s): TSH, FREET4 in the last 168 hours.  Lab Results   Component Value Date    HGBA1C 6.0 (H) 10/04/2018       Nutritional status:   Body mass index is 25.4 kg/m².  Lab Results   Component Value Date    ALBUMIN 2.9 (L) 10/11/2018    ALBUMIN 3.1 (L) 10/10/2018    ALBUMIN 2.5 (L) 10/09/2018     Lab Results   Component Value Date    PREALBUMIN 12 (L) 2018       Estimated Creatinine Clearance: 25.3 mL/min (A) (based on SCr of 1.9 mg/dL (H)).    Accu-Checks  Recent Labs      10/10/18   0237  10/10/18   0814  10/10/18   1334  10/10/18   1814  10/10/18   2053  10/11/18   0901  10/11/18   1248  10/11/18   1413  10/11/18   1852  10/11/18   2155   POCTGLUCOSE  91  230*  267*  216*  216*  226*  220*  251*  235*  179*       Current Medications and/or Treatments Impacting Glycemic Control  Immunotherapy:    Immunosuppressants         Stop Route Frequency     tacrolimus capsule 0.5 mg      -- Oral Daily     mycophenolate capsule 1,000 " mg      -- Oral 2 times daily        Steroids:   Hormones (From admission, onward)    Start     Stop Route Frequency Ordered    10/11/18 0900  predniSONE tablet 20 mg      10/18 0859 Oral Daily 10/10/18 0829        Pressors:    Autonomic Drugs (From admission, onward)    Start     Stop Route Frequency Ordered    10/10/18 1500  midodrine tablet 15 mg      -- Oral 3 times daily 10/10/18 1122    10/06/18 1617  rocuronium (ZEMURON) 10 mg/mL injection     Comments:  Created by cabinet override    10/07 0429   10/06/18 1617        Hyperglycemia/Diabetes Medications:   Antihyperglycemics (From admission, onward)    Start     Stop Route Frequency Ordered    10/11/18 1645  insulin aspart U-100 pen 6 Units      -- SubQ 3 times daily with meals 10/11/18 1519    10/09/18 1535  insulin aspart U-100 pen 2 Units      -- SubQ 3 times daily PRN 10/09/18 1436    10/07/18 1200  insulin regular (Humulin R) 100 Units in sodium chloride 0.9% 100 mL infusion      -- IV Continuous 10/07/18 1055    10/07/18 1155  insulin aspart U-100 pen 0-4 Units      -- SubQ As needed (PRN) 10/07/18 1055

## 2018-10-12 NOTE — ASSESSMENT & PLAN NOTE
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      - acute gradual worsening of renal function s/p OLT  - 10/06 was taken to the OR for possibility of billiary leak and post op complication due to hypercapnic hypoxic respiratory failure, no blood pressure drop intraoperatively per anesthesiology notes. Patient has not been needing vasopressors to maintain BP  - Creatinine on arrival 0.8 and has been up trending   - BUN/cr (10/11/18): 91/1.9; (10/12/18): 100/2.0  - Tacrolimus 26.9 (10/08); 6.8 (10/12)  - Protein Creatine ratio: 0.82  - UOP: 5 occurences/12 hours 125 cc  - UA consistent with hematuria, proteinuria, RBC and amorphous casts   - Fena 0.5%; consistent with hypovolemia   - Renal US negative for obstruction, stricture or, hydronephrosis    Assessment: Acute Renal failure in setting of hepatic ischemia reperfusion injury and poor perfusion status vs acute tubular necrosis from immunosuppressive agents      Plan:  -  Diureses w/ Lasix 80 mg BID daily.   - spin urine to evaluate ATN  - renal ultrasound to assess hydronephrosis  - avoid nephrotoxic drugs, ACEI/ARB, NSAIDS  - adjust drugs to GFR: bactrim TID  - Repeat tacrolimus levels and consider renal adjustment  - Acute RRT not indicated at this point  - will reassess in am with repeat RFT's and electrolytes

## 2018-10-12 NOTE — ASSESSMENT & PLAN NOTE
Avoid insulin stacking and hypoglycemia.  Lab Results   Component Value Date    CREATININE 2.0 (H) 10/12/2018

## 2018-10-12 NOTE — ASSESSMENT & PLAN NOTE
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   -POD 1 US: increased arterial resistive indices, suggestive of edema vs cute rejection vs congestion  -Repeat US 10/9 with continued elevation of RIs  -LFTs trending down  -T bili trending down  -repeat liver US (10/11): elevated RIs

## 2018-10-12 NOTE — PROGRESS NOTES
Met with patient, , son and daughter with shahla Duqueer.  Completed the review of My New Journey: Living Smart After My Liver Transplant.  Sections reviewed today: Appointments and Prevention.  Allowed time for questions and answers.

## 2018-10-12 NOTE — PLAN OF CARE
Pt AAOx4, VSS, afebrile.  Pt caregiver continues to question self-meds; education reinforcement needed.  Pt caregiver repetitively expresses concern about pt kidneys, bladder, and fluid collection; requested to talk to MD.  At bedside, MD answered caregiver questions and informed pt that nephrology is following and will answer questions in AM.  Pain controlled with oxy10 x1.  Fall risk precautions initiated.  Pt in lowest bed position setting, lighting adjusted, pt to wear nonskid socks when ambulating, side rails up x2.  Pt family at bedside.  Pt remain free from falls during shift.  Call light within reach. Will continue to monitor.

## 2018-10-12 NOTE — SUBJECTIVE & OBJECTIVE
Scheduled Meds:   entecavir  0.5 mg Oral Q48H    ergocalciferol  50,000 Units Oral Q7 Days    fluconazole  200 mg Oral Daily    heparin (porcine)  5,000 Units Subcutaneous Q8H    insulin aspart U-100  7 Units Subcutaneous TIDWM    midodrine  15 mg Oral TID    mirtazapine  7.5 mg Oral QHS    mycophenolate  1,000 mg Oral BID    pantoprazole  40 mg Oral Daily    predniSONE  20 mg Oral Daily    sodium bicarbonate  1,300 mg Oral QID    sulfamethoxazole-trimethoprim 400-80mg  1 tablet Oral Every Mon, Wed, Fri    tacrolimus  0.5 mg Oral Daily    ursodiol  300 mg Oral BID    [START ON 10/15/2018] valGANciclovir  450 mg Oral Daily     Continuous Infusions:   insulin (HUMAN R) infusion (adults) 0.8 Units/hr (10/12/18 1256)     PRN Meds:bisacodyl, calcium carbonate, dextrose 50%, dextrose 50%, glucagon (human recombinant), glucose, glucose, insulin aspart U-100, insulin aspart U-100, ondansetron, oxyCODONE, oxyCODONE, polyethylene glycol, promethazine (PHENERGAN) IVPB, sodium chloride 0.9%    Review of Systems   Constitutional: Positive for activity change. Negative for chills and fever.   Respiratory: Positive for cough. Negative for shortness of breath.    Cardiovascular: Positive for leg swelling. Negative for chest pain.   Gastrointestinal: Positive for abdominal distention and abdominal pain. Negative for constipation, diarrhea, nausea and vomiting.   Genitourinary: Positive for decreased urine volume. Negative for dysuria.   Musculoskeletal: Positive for back pain (lower).   Allergic/Immunologic: Positive for immunocompromised state.   Neurological: Negative for tremors and headaches.   Psychiatric/Behavioral: Negative for confusion.     Objective:     Vital Signs (Most Recent):  Temp: 96.7 °F (35.9 °C) (10/11/18 1930)  Pulse: 98 (10/12/18 1130)  Resp: 10 (10/12/18 1130)  BP: (!) 113/54 (10/12/18 0945)  SpO2: 100 % (10/12/18 1130) Vital Signs (24h Range):  Temp:  [96.7 °F (35.9 °C)-97.6 °F (36.4 °C)] 96.7  °F (35.9 °C)  Pulse:  [] 98  Resp:  [8-18] 10  SpO2:  [98 %-100 %] 100 %  BP: (109-143)/(52-72) 113/54     Weight: 66.7 kg (146 lb 15.9 oz)  Body mass index is 26.04 kg/m².    Intake/Output - Last 3 Shifts       10/10 0700 - 10/11 0659 10/11 0700 - 10/12 0659 10/12 0700 - 10/13 0659    P.O. 720 1190     I.V. (mL/kg) 31.3 (0.5) 42.8 (0.6) 103.2 (1.5)    IV Piggyback       Total Intake(mL/kg) 751.3 (11.6) 1232.8 (18.5) 103.2 (1.5)    Urine (mL/kg/hr) 520 (0.3) 0 (0) 325 (0.7)    Emesis/NG output 0 0     Drains 170      Other 0 0     Stool 0 0 0    Blood  0     Total Output 690 0 325    Net +61.3 +1232.8 -221.8           Urine Occurrence 4 x 9 x 0 x    Stool Occurrence 5 x 3 x 0 x    Emesis Occurrence 0 x 0 x           Physical Exam    Laboratory:  Immunosuppressants         Stop Route Frequency     tacrolimus capsule 0.5 mg      -- Oral Daily     mycophenolate capsule 1,000 mg      -- Oral 2 times daily        CBC:   Recent Labs   Lab  10/12/18   0600   WBC  7.74   RBC  2.82*   HGB  8.5*   HCT  24.3*   PLT  68*   MCV  86   MCH  30.1   MCHC  35.0     CMP:   Recent Labs   Lab  10/12/18   0600   GLU  70   CALCIUM  8.2*   ALBUMIN  2.5*   PROT  3.7*   NA  131*   K  3.8   CO2  21*   CL  98   BUN  100*   CREATININE  2.0*   ALKPHOS  147*   ALT  90*   AST  43*   BILITOT  8.0*     Labs within the past 24 hours have been reviewed.    Diagnostic Results:  I have personally reviewed all pertinent imaging studies.

## 2018-10-12 NOTE — ASSESSMENT & PLAN NOTE
Managed per primary team  Avoid hypoglycemia    Recent Labs   Lab  10/12/18   0600   ALT  90*   AST  43*   ALKPHOS  147*   BILITOT  8.0*   PROT  3.7*   ALBUMIN  2.5*

## 2018-10-12 NOTE — PROGRESS NOTES
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  Paracentesis 10/1 with 5L removed, no fluid sent for analysis. 10/02 significant for hyponatremia, Na 119 and she was admitted to LTS for sodium monitoring. On 10/05/18 she is s/p DBDLT (CMV D+/R+, steroid induction, MMF/tacro/pred maintenance) with take back on 10/6 secondary to hyperbilirubinema, no leak identified. Post-op course complicated by hypercapneic and hypoxic respiratory failure and was intubated 10/06 and extubated 10/07. Liver bx (10/6) sig for Zone 3 hemorrhage and collapse, in addition to cholestasis. ID consulted to comment on positive diphtheroid culture from ascitic fluid (likely skin colonization) as well as ESBL Klebsiella pneumoniae in urine culture (10/4).  Pt asymptomatic and cell count from ascitic fluid unremarkable. Mild peritransplant ascites- repeat US 10/6 with increased arterial resistive indices. Repeat US 10/9 w/ continued elevation in RIs and fluid collections. LFTs trending down nicely.     Patient is currently on entecavir 0.5 mg, Hepagam, mycophenalate 1000 mg BID, prednisone 20 mg and prophylaxis with Bactrim, fluconazole and Valgancyclovir.    Nephrology consulted for acute kidney failure is setting of Orthotopic Liver Transplant. Cr 0.8 on 10/05, started to trend up and 1.5 (10/09), 1.8 (10/10), and 1.9 (10/11). UOP 10/10/18: 520 ml. UA (10/09): 1+ occult blood and 1+ protein with RBC and amorphous casts.      Interval History: patient reports not feeling well this am, reports fatigue, malaise, weakness, and lower back and lower extremity swelling.      Review of patient's allergies  indicates:  No Known Allergies  Current Facility-Administered Medications   Medication Frequency    bisacodyl suppository 10 mg Daily PRN    calcium carbonate 200 mg calcium (500 mg) chewable tablet 500 mg TID PRN    dextrose 50% injection 12.5 g PRN    dextrose 50% injection 25 g PRN    entecavir tablet 0.5 mg Q48H    ergocalciferol capsule 50,000 Units Q7 Days    fluconazole tablet 200 mg Daily    glucagon (human recombinant) injection 1 mg PRN    glucose chewable tablet 16 g PRN    glucose chewable tablet 24 g PRN    heparin (porcine) injection 5,000 Units Q8H    insulin aspart U-100 pen 0-4 Units PRN    insulin aspart U-100 pen 2 Units TID PRN    insulin aspart U-100 pen 7 Units TIDWM    insulin regular (Humulin R) 100 Units in sodium chloride 0.9% 100 mL infusion Continuous    midodrine tablet 15 mg TID    mirtazapine tablet 7.5 mg QHS    mycophenolate capsule 1,000 mg BID    ondansetron disintegrating tablet 8 mg Q8H PRN    oxyCODONE immediate release tablet 5 mg Q4H PRN    oxyCODONE immediate release tablet Tab 10 mg Q4H PRN    pantoprazole EC tablet 40 mg Daily    polyethylene glycol packet 17 g Daily PRN    predniSONE tablet 20 mg Daily    promethazine (PHENERGAN) 6.25 mg in dextrose 5 % 50 mL IVPB Q6H PRN    sodium bicarbonate tablet 1,300 mg QID    sodium chloride 0.9% flush 3 mL PRN    sulfamethoxazole-trimethoprim 400-80mg per tablet 1 tablet Every Mon, Wed, Fri    tacrolimus capsule 0.5 mg Daily    ursodiol capsule 300 mg BID    [START ON 10/15/2018] valGANciclovir tablet 450 mg Daily     Review of Systems   Constitutional: Negative for chills, fatigue and fever.   Respiratory: Negative for cough and shortness of breath.    Cardiovascular: Positive for leg swelling. Negative for chest pain.   Gastrointestinal: Positive for abdominal distention. Negative for abdominal pain, constipation, diarrhea and vomiting.   Genitourinary: Negative for decreased urine  volume, difficulty urinating and dysuria.   Musculoskeletal: Negative for arthralgias and back pain.   Allergic/Immunologic: Positive for immunocompromised state.   Neurological: Negative for tremors and headaches.      Objective:     Vital Signs (Most Recent):  Temp: 96.7 °F (35.9 °C) (10/11/18 1930)  Pulse: 96 (10/12/18 1514)  Resp: 19 (10/12/18 1405)  BP: (!) 113/54 (10/12/18 0945)  SpO2: 100 % (10/12/18 1130)  O2 Device (Oxygen Therapy): room air (10/11/18 1731) Vital Signs (24h Range):  Temp:  [96.7 °F (35.9 °C)-97.6 °F (36.4 °C)] 96.7 °F (35.9 °C)  Pulse:  [] 96  Resp:  [8-19] 19  SpO2:  [98 %-100 %] 100 %  BP: (109-143)/(52-72) 113/54     Weight: 66.7 kg (146 lb 15.9 oz) (10/12/18 0600)  Body mass index is 26.04 kg/m².  Body surface area is 1.72 meters squared.    I/O last 3 completed shifts:  In: 1984.1 [P.O.:1910; I.V.:74.1]  Out: 520 [Urine:520]    Physical Exam  Constitutional: She is oriented to person, place, and time. She appears well-developed.   Temporal and distal extremity muscle wasting; yellowing of skin  Eyes: EOM are normal. Pupils are equal, round, and reactive to light. Scleral icterus is present.   Neck: Normal range of motion.   Cardiovascular: Normal rate and regular rhythm.   No murmur heard.  Pulmonary/Chest: Effort normal and breath sounds normal. No respiratory distress. She has no wheezes.   Abdominal: Soft. Bowel sounds are normal. She exhibits distension. There is no tenderness.   Chevron incision w/ staples, CDI  Musculoskeletal: Normal range of motion. She exhibits edema (BL lower extremities).   Neurological: She is alert and oriented to person, place, and time.   Skin: Skin is warm and dry.   Nursing note and vitals reviewed.    Significant Labs:  CBC:   Recent Labs   Lab  10/12/18   0600   WBC  7.74   RBC  2.82*   HGB  8.5*   HCT  24.3*   PLT  68*   MCV  86   MCH  30.1   MCHC  35.0     CMP:   Recent Labs   Lab  10/12/18   0600   GLU  70   CALCIUM  8.2*   ALBUMIN  2.5*    PROT  3.7*   NA  131*   K  3.8   CO2  21*   CL  98   BUN  100*   CREATININE  2.0*   ALKPHOS  147*   ALT  90*   AST  43*   BILITOT  8.0*     Recent Labs   Lab  10/09/18   1130   COLORU  Stacy   SPECGRAV  1.020   PHUR  5.0   PROTEINUA  1+*   BACTERIA  None   NITRITE  Negative   LEUKOCYTESUR  Negative   UROBILINOGEN  Negative   HYALINECASTS  0     All labs within the past 24 hours have been reviewed.     Significant Imaging:    10/11/2018 US Retroperitoneal:     FINDINGS:  Right kidney: Normal in size measuring 9.8 cm. Increased cortical echogenicity.  No loss of corticomedullary distinction. Resistive index measures 0.78.  No mass. No renal stone. No hydronephrosis.    Left kidney: Normal in size measuring 10.0 cm. Increased cortical echogenicity.  No loss of corticomedullary distinction. Resistive index measures 0.77.  No mass. No renal stone. No hydronephrosis.    The bladder is partially distended at the time of scanning and has an unremarkable appearance.    Mild volume ascites.      Impression       Upper normal to mildly elevated renal resistive indices suggestive of medical renal disease.    Ascites.     Assessment/Plan:      Post LT - Acute renal failure    Post-LT EVA is multifactorial in origin and has been related to the severity of liver disease, toxicity of immunosuppressive therapy and hepatic ischaemia reperfusion injury might be a driving force in the aetiology of post-LT EVA.      - acute gradual worsening of renal function s/p OLT  - 10/06 was taken to the OR for possibility of billiary leak and post op complication due to hypercapnic hypoxic respiratory failure, no blood pressure drop intraoperatively per anesthesiology notes. Patient has not been needing vasopressors to maintain BP  - Creatinine on arrival 0.8 and has been up trending   - BUN/cr (10/11/18): 91/1.9; (10/12/18): 100/2.0  - Tacrolimus 26.9 (10/08); 6.8 (10/12)  - Protein Creatine ratio: 0.82  - UOP: 5 occurences/12 hours 125 cc  -  UA consistent with hematuria, proteinuria, RBC and amorphous casts   - Fena 0.5%; consistent with hypovolemia   - Renal US negative for obstruction, stricture or, hydronephrosis    Assessment: Acute Renal failure in setting of hepatic ischemia reperfusion injury and poor perfusion status vs acute tubular necrosis from immunosuppressive agents      Plan:  -  Diureses w/ Lasix 80 mg BID daily.   - spin urine to evaluate ATN  - renal ultrasound to assess hydronephrosis  - avoid nephrotoxic drugs, ACEI/ARB, NSAIDS  - adjust drugs to GFR: bactrim TID  - Repeat tacrolimus levels and consider renal adjustment  - Acute RRT not indicated at this point  - will reassess in am with repeat RFT's and electrolytes; and response to diuretics  - Strict I and O's; monitor UOP in response to lasix                  Thank you for your consult. I will follow-up with patient. Please contact us if you have any additional questions.     Darrel Adhikari MD   Internal Medicine PGY II  Pager: [phone]  Nephrology  [facility]

## 2018-10-12 NOTE — PROGRESS NOTES
Prior to inserting gonsales catheter, RN dumped 250cc out of hat in bathroom.      Jihan informed.  Waiting on call back about if team still want gonsales inserted.

## 2018-10-12 NOTE — ASSESSMENT & PLAN NOTE
-Valcyte for CMV prophylaxis --> will start at discharge or  POD #10 in hospital  - Bactrim for PCP prophylaxis (MWF)  -Fluconazole for fungal prophylaxis

## 2018-10-12 NOTE — ASSESSMENT & PLAN NOTE
Cellcept/Prograf/pred  Held prograf 10/8 for elevated levels  Resumed Prograf 10/10 at dose of 0.5mg   Will monitor for signs of toxic side effects, check daily tacrolimus troughs, and change meds accordingly.

## 2018-10-13 LAB
ALBUMIN SERPL BCP-MCNC: 2.5 G/DL
ALP SERPL-CCNC: 164 U/L
ALT SERPL W/O P-5'-P-CCNC: 89 U/L
ANION GAP SERPL CALC-SCNC: 14 MMOL/L
APTT BLDCRRT: 25.2 SEC
AST SERPL-CCNC: 42 U/L
BASOPHILS # BLD AUTO: 0 K/UL
BASOPHILS NFR BLD: 0 %
BILIRUB SERPL-MCNC: 8.2 MG/DL
BUN SERPL-MCNC: 110 MG/DL
CALCIUM SERPL-MCNC: 8.5 MG/DL
CHLORIDE SERPL-SCNC: 94 MMOL/L
CO2 SERPL-SCNC: 19 MMOL/L
CREAT SERPL-MCNC: 2.4 MG/DL
DIFFERENTIAL METHOD: ABNORMAL
EOSINOPHIL # BLD AUTO: 0 K/UL
EOSINOPHIL NFR BLD: 0.1 %
ERYTHROCYTE [DISTWIDTH] IN BLOOD BY AUTOMATED COUNT: 17.5 %
EST. GFR  (AFRICAN AMERICAN): 23 ML/MIN/1.73 M^2
EST. GFR  (NON AFRICAN AMERICAN): 20 ML/MIN/1.73 M^2
GLUCOSE SERPL-MCNC: 181 MG/DL
HCT VFR BLD AUTO: 24.7 %
HGB BLD-MCNC: 8.6 G/DL
IMM GRANULOCYTES # BLD AUTO: 0.19 K/UL
IMM GRANULOCYTES NFR BLD AUTO: 1.8 %
INR PPP: 1.1
LYMPHOCYTES # BLD AUTO: 0.5 K/UL
LYMPHOCYTES NFR BLD: 4.8 %
MAGNESIUM SERPL-MCNC: 1.8 MG/DL
MCH RBC QN AUTO: 29.8 PG
MCHC RBC AUTO-ENTMCNC: 34.8 G/DL
MCV RBC AUTO: 86 FL
MONOCYTES # BLD AUTO: 0.6 K/UL
MONOCYTES NFR BLD: 5.3 %
NEUTROPHILS # BLD AUTO: 9.3 K/UL
NEUTROPHILS NFR BLD: 88 %
NRBC BLD-RTO: 0 /100 WBC
PHOSPHATE SERPL-MCNC: 5.5 MG/DL
PLATELET # BLD AUTO: 116 K/UL
PMV BLD AUTO: 11.9 FL
POCT GLUCOSE: 175 MG/DL (ref 70–110)
POCT GLUCOSE: 197 MG/DL (ref 70–110)
POCT GLUCOSE: 224 MG/DL (ref 70–110)
POTASSIUM SERPL-SCNC: 4.3 MMOL/L
PROT SERPL-MCNC: 4 G/DL
PROTHROMBIN TIME: 11.5 SEC
RBC # BLD AUTO: 2.89 M/UL
SODIUM SERPL-SCNC: 127 MMOL/L
TACROLIMUS BLD-MCNC: 5.6 NG/ML
WBC # BLD AUTO: 10.58 K/UL

## 2018-10-13 PROCEDURE — 63600175 PHARM REV CODE 636 W HCPCS: Performed by: NURSE PRACTITIONER

## 2018-10-13 PROCEDURE — 85025 COMPLETE CBC W/AUTO DIFF WBC: CPT

## 2018-10-13 PROCEDURE — 99232 SBSQ HOSP IP/OBS MODERATE 35: CPT | Mod: ,,, | Performed by: NURSE PRACTITIONER

## 2018-10-13 PROCEDURE — 25000003 PHARM REV CODE 250: Performed by: STUDENT IN AN ORGANIZED HEALTH CARE EDUCATION/TRAINING PROGRAM

## 2018-10-13 PROCEDURE — 25000003 PHARM REV CODE 250: Performed by: TRANSPLANT SURGERY

## 2018-10-13 PROCEDURE — 63600175 PHARM REV CODE 636 W HCPCS: Performed by: SURGERY

## 2018-10-13 PROCEDURE — 83735 ASSAY OF MAGNESIUM: CPT

## 2018-10-13 PROCEDURE — 85610 PROTHROMBIN TIME: CPT

## 2018-10-13 PROCEDURE — 99900035 HC TECH TIME PER 15 MIN (STAT)

## 2018-10-13 PROCEDURE — 25000003 PHARM REV CODE 250: Performed by: PHYSICIAN ASSISTANT

## 2018-10-13 PROCEDURE — 20600001 HC STEP DOWN PRIVATE ROOM

## 2018-10-13 PROCEDURE — 99233 SBSQ HOSP IP/OBS HIGH 50: CPT | Mod: ,,, | Performed by: INTERNAL MEDICINE

## 2018-10-13 PROCEDURE — 84100 ASSAY OF PHOSPHORUS: CPT

## 2018-10-13 PROCEDURE — 94664 DEMO&/EVAL PT USE INHALER: CPT

## 2018-10-13 PROCEDURE — 94761 N-INVAS EAR/PLS OXIMETRY MLT: CPT

## 2018-10-13 PROCEDURE — 99233 SBSQ HOSP IP/OBS HIGH 50: CPT | Mod: 24,,, | Performed by: NURSE PRACTITIONER

## 2018-10-13 PROCEDURE — 85730 THROMBOPLASTIN TIME PARTIAL: CPT

## 2018-10-13 PROCEDURE — 80197 ASSAY OF TACROLIMUS: CPT

## 2018-10-13 PROCEDURE — 80053 COMPREHEN METABOLIC PANEL: CPT

## 2018-10-13 PROCEDURE — 25000003 PHARM REV CODE 250: Performed by: NURSE PRACTITIONER

## 2018-10-13 PROCEDURE — 63600175 PHARM REV CODE 636 W HCPCS: Performed by: STUDENT IN AN ORGANIZED HEALTH CARE EDUCATION/TRAINING PROGRAM

## 2018-10-13 RX ORDER — FUROSEMIDE 10 MG/ML
80 INJECTION INTRAMUSCULAR; INTRAVENOUS 2 TIMES DAILY
Status: DISCONTINUED | OUTPATIENT
Start: 2018-10-13 | End: 2018-10-14

## 2018-10-13 RX ORDER — FUROSEMIDE 10 MG/ML
80 INJECTION INTRAMUSCULAR; INTRAVENOUS 2 TIMES DAILY
Status: DISCONTINUED | OUTPATIENT
Start: 2018-10-13 | End: 2018-10-13

## 2018-10-13 RX ORDER — ENTECAVIR 0.5 MG/1
0.5 TABLET, FILM COATED ORAL
Status: DISCONTINUED | OUTPATIENT
Start: 2018-10-15 | End: 2018-11-21

## 2018-10-13 RX ADMIN — INSULIN ASPART 1 UNITS: 100 INJECTION, SOLUTION INTRAVENOUS; SUBCUTANEOUS at 02:10

## 2018-10-13 RX ADMIN — INSULIN ASPART 9 UNITS: 100 INJECTION, SOLUTION INTRAVENOUS; SUBCUTANEOUS at 09:10

## 2018-10-13 RX ADMIN — FLUCONAZOLE 200 MG: 200 TABLET ORAL at 08:10

## 2018-10-13 RX ADMIN — URSODIOL 300 MG: 300 CAPSULE ORAL at 08:10

## 2018-10-13 RX ADMIN — MIDODRINE HYDROCHLORIDE 15 MG: 5 TABLET ORAL at 03:10

## 2018-10-13 RX ADMIN — FUROSEMIDE 80 MG: 10 INJECTION, SOLUTION INTRAMUSCULAR; INTRAVENOUS at 08:10

## 2018-10-13 RX ADMIN — INSULIN DETEMIR 18 UNITS: 100 INJECTION, SOLUTION SUBCUTANEOUS at 08:10

## 2018-10-13 RX ADMIN — MIDODRINE HYDROCHLORIDE 15 MG: 5 TABLET ORAL at 08:10

## 2018-10-13 RX ADMIN — URSODIOL 300 MG: 300 CAPSULE ORAL at 09:10

## 2018-10-13 RX ADMIN — BISACODYL 10 MG: 10 SUPPOSITORY RECTAL at 01:10

## 2018-10-13 RX ADMIN — FUROSEMIDE 80 MG: 10 INJECTION, SOLUTION INTRAMUSCULAR; INTRAVENOUS at 05:10

## 2018-10-13 RX ADMIN — INSULIN ASPART 9 UNITS: 100 INJECTION, SOLUTION INTRAVENOUS; SUBCUTANEOUS at 02:10

## 2018-10-13 RX ADMIN — SODIUM BICARBONATE 650 MG TABLET 1300 MG: at 09:10

## 2018-10-13 RX ADMIN — INSULIN ASPART 9 UNITS: 100 INJECTION, SOLUTION INTRAVENOUS; SUBCUTANEOUS at 07:10

## 2018-10-13 RX ADMIN — HEPARIN SODIUM 5000 UNITS: 5000 INJECTION, SOLUTION INTRAVENOUS; SUBCUTANEOUS at 05:10

## 2018-10-13 RX ADMIN — HEPARIN SODIUM 5000 UNITS: 5000 INJECTION, SOLUTION INTRAVENOUS; SUBCUTANEOUS at 08:10

## 2018-10-13 RX ADMIN — TACROLIMUS 0.5 MG: 0.5 CAPSULE ORAL at 08:10

## 2018-10-13 RX ADMIN — SODIUM BICARBONATE 650 MG TABLET 1300 MG: at 08:10

## 2018-10-13 RX ADMIN — PANTOPRAZOLE SODIUM 40 MG: 40 TABLET, DELAYED RELEASE ORAL at 08:10

## 2018-10-13 RX ADMIN — HEPARIN SODIUM 5000 UNITS: 5000 INJECTION, SOLUTION INTRAVENOUS; SUBCUTANEOUS at 02:10

## 2018-10-13 RX ADMIN — PREDNISONE 20 MG: 10 TABLET ORAL at 08:10

## 2018-10-13 RX ADMIN — SODIUM BICARBONATE 650 MG TABLET 1300 MG: at 05:10

## 2018-10-13 RX ADMIN — SODIUM BICARBONATE 650 MG TABLET 1300 MG: at 01:10

## 2018-10-13 RX ADMIN — MYCOPHENOLATE MOFETIL 1000 MG: 250 CAPSULE ORAL at 08:10

## 2018-10-13 NOTE — ASSESSMENT & PLAN NOTE
BG goal 140 - 180     continue transition IV insulin drip at  0.8  U/hr until 2100 then stop IV insulin and start Levemir 18 units HS.   Continue Novolog 9 units with meals.   2 units PRN protein shake  Low dose correction scale insulin  BG monitoring AC/HS/0200        Discharge planning:  TBD

## 2018-10-13 NOTE — PROGRESS NOTES
[facility]  Liver Transplant  Progress Note    Patient Name: Scarlett Reddy  MRN: [id number]  Admission Date: 10/1/2018  Hospital Length of Stay: 11 days  Code Status: Full Code  Primary Care Provider: Primary Doctor No  Post-Operative Day: 8    ORGAN:   LIVER  Disease Etiology: Cirrhosis: Type B and D  Donor Type:    - Brain Death  CDC High Risk:   No  Donor CMV Status:   Donor CMV Status: Positive  Donor HBcAB:   Negative  Donor HCV Status:   Negative  Donor HBV SETH: Negative  Donor HCV SETH: Negative  Whole or Partial: Whole Liver  Biliary Anastomosis: End to End  Arterial Anatomy: Replaced Left Hepatic and Right Hepatic  Subjective:     History of Present Illness:  Ms. Reddy is a 67 y/o female with PMH of ESLD secondary Hep B and D.  Listed for liver transplant with MELD 23.  Paracentesis 10/1 with 5L removed, no fluid sent for analysis.  Morning labs significant for hyponatremia, Na 119.  Pt admitted to LTS for sodium monitoring.        Hospital Course:  Hospital Course: 67 y/o F h/o HBV/delta cirrhosis s/p DBDLT 10/5/18 (CMV D+/R+, steroid induction, MMF/tacro/pred maintenance) with take back on 10/6 2/2 hyperbilirubinema, no leak identified. Post-op course c/b hypercapneic and hypoxic respiratory failure and was reintubated though quickly extubated now doing well. Liver bx (10/6) sig for Zone 3 hemorrhage and collapse, in addition to cholestasis. Transferred from ICU on POD #4. ID consulted to comment on positive diphtheroid culture from ascitic fluid (likely skin colonization) as well as ESBL Klebsiella pneumoniae in urine culture (10/4).  Pt asymptomatic and cell count from ascitic fluid unremarkable. Per ID recs, no need to treat diphtheroids or asymptomatic ESBL Kleb pneumo bacteriuria though pt has had 6 days of therapy which would cover these organisms. Mild peritransplant ascites- repeat US 10/6 with increased arterial resistive indices. Repeat US 10/9 w/ continued elevation in  RIs and fluid collections. LFTs trending down nicely. Acute gradual worsening of renal function s/p OLT.Creatinine on arrival 0.8 and has been up trending. Nephrology consulted for post-op EVA.     Interval history: despite BUN elevated more to 110, pt feeling this am and sitting up in chair to eat breakfast.  She continued to have significant BLE edema.  Nephrology following and assessing need for HD on daily basis.  Cont Lasix 80 mg bid.    Cont strict I/O's.  Renal US (10/11) with no evidence of hydronephrosis. Bedside Echo w/ findings of diastolic dysfunction, likely 2/2 ARF. Encourage IS and OOB.  Monitor.      Scheduled Meds:   [START ON 10/15/2018] entecavir  0.5 mg Oral Q72H    ergocalciferol  50,000 Units Oral Q7 Days    fluconazole  200 mg Oral Daily    furosemide  80 mg Intravenous BID    heparin (porcine)  5,000 Units Subcutaneous Q8H    insulin aspart U-100  9 Units Subcutaneous TIDWM    insulin detemir U-100  18 Units Subcutaneous QHS    midodrine  15 mg Oral TID    mirtazapine  7.5 mg Oral QHS    mycophenolate  1,000 mg Oral BID    pantoprazole  40 mg Oral Daily    predniSONE  20 mg Oral Daily    sodium bicarbonate  1,300 mg Oral QID    sulfamethoxazole-trimethoprim 400-80mg  1 tablet Oral Every Mon, Wed, Fri    tacrolimus  0.5 mg Oral Daily    ursodiol  300 mg Oral BID    [START ON 10/15/2018] valGANciclovir  450 mg Oral Daily     Continuous Infusions:   insulin (HUMAN R) infusion (adults) 0.8 Units/hr (10/12/18 1256)     PRN Meds:bisacodyl, calcium carbonate, dextrose 50%, dextrose 50%, glucagon (human recombinant), glucose, glucose, insulin aspart U-100, insulin aspart U-100, ondansetron, oxyCODONE, oxyCODONE, polyethylene glycol, promethazine (PHENERGAN) IVPB, sodium chloride 0.9%    Review of Systems   Constitutional: Positive for activity change. Negative for chills and fever.   Respiratory: Positive for cough. Negative for shortness of breath.    Cardiovascular: Positive for  leg swelling. Negative for chest pain.   Gastrointestinal: Positive for abdominal distention and abdominal pain. Negative for constipation, diarrhea, nausea and vomiting.   Genitourinary: Positive for decreased urine volume. Negative for dysuria.   Musculoskeletal: Positive for back pain (lower).   Allergic/Immunologic: Positive for immunocompromised state.   Neurological: Negative for tremors and headaches.   Psychiatric/Behavioral: Negative for confusion.     Objective:     Vital Signs (Most Recent):  Temp: 97.2 °F (36.2 °C) (10/13/18 1630)  Pulse: 80 (10/13/18 1630)  Resp: 16 (10/13/18 1630)  BP: (!) 102/52 (10/13/18 1630)  SpO2: 96 % (10/13/18 1630) Vital Signs (24h Range):  Temp:  [96.2 °F (35.7 °C)-98 °F (36.7 °C)] 97.2 °F (36.2 °C)  Pulse:  [] 80  Resp:  [11-18] 16  SpO2:  [96 %-99 %] 96 %  BP: ()/(52-67) 102/52     Weight: 68.5 kg (151 lb 0.2 oz)  Body mass index is 26.75 kg/m².    Intake/Output - Last 3 Shifts       10/11 0700 - 10/12 0659 10/12 0700 - 10/13 0659 10/13 0700 - 10/14 0659    P.O. 3649 103 1073    I.V. (mL/kg) 42.8 (0.6) 116 (1.7) 8 (0.1)    Total Intake(mL/kg) 1232.8 (18.5) 296 (4.3) 1008 (14.7)    Urine (mL/kg/hr) 0 (0) 875 (0.5) 250 (0.3)    Emesis/NG output 0      Drains       Other 0      Stool 0 0 0    Blood 0      Total Output 0 875 250    Net +1232.8 -579 +758           Urine Occurrence 9 x 0 x 4 x    Stool Occurrence 3 x 0 x 1 x    Emesis Occurrence 0 x            Physical Exam      Laboratory:  Immunosuppressants         Stop Route Frequency     tacrolimus capsule 0.5 mg      -- Oral Daily     mycophenolate capsule 1,000 mg      -- Oral 2 times daily        CBC:   Recent Labs   Lab  10/13/18   0530   WBC  10.58   RBC  2.89*   HGB  8.6*   HCT  24.7*   PLT  116*   MCV  86   MCH  29.8   MCHC  34.8     CMP:   Recent Labs   Lab  10/13/18   0530   GLU  181*   CALCIUM  8.5*   ALBUMIN  2.5*   PROT  4.0*   NA  127*   K  4.3   CO2  19*   CL  94*   BUN  110*   CREATININE  2.4*    ALKPHOS  164*   ALT  89*   AST  42*   BILITOT  8.2*     Labs within the past 24 hours have been reviewed.    Diagnostic Results:  I have personally reviewed all pertinent imaging studies.    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   -POD 1 US: increased arterial resistive indices, suggestive of edema vs cute rejection vs congestion  -Repeat US 10/9 with continued elevation of RIs  -LFTs trending down  -T bili trending down  -repeat liver US (10/11): elevated RIs           Post LT - Acute renal failure    - Creatinine on arrival 0.8 and has been up trending   BUN/cr (10/11/18): 91/1.9   Tacrolimus 26.9 (10/08)  Nephrology consulted-- no RRT recommended at this time  Renal US (10/11): no hydronephrosis  Echo 10/12: diastolic dysfunction, likely 2/2 to ARF  CVP 7.  - pt on renal diet w 1500 ml FR.          Hypervolemia associated with renal insufficiency    - Diuresed w/ Lasix 100mg x 1 (10/12).  - started Lasix 80 mg bid per Nephrology.          EVA (acute kidney injury)              Metabolic acidosis    - Started Na bicarb 1300 QID          At risk for opportunistic infections    - Valcyte for CMV prophylaxis --> will start at discharge or  POD #10 in hospital  - Bactrim for PCP prophylaxis (MWF)  - Fluconazole for fungal prophylaxis           Long-term use of immunosuppressant medication    Cellcept/Prograf/pred  Held prograf 10/8 for elevated levels  Resumed Prograf 10/10 at dose of 0.5mg   Will monitor for signs of toxic side effects, check daily tacrolimus troughs, and change meds accordingly.          Prophylactic immunotherapy    - See long term use immunosuppresion          Ascites    - paracentesis 10/1, 5L removed, no fluid sent for analysis.  - diuresing with albumin and lasix 10/9.  - Per Nephrology, Lasix 80 mg bid and monitor.   No need for RRT.          Weakness              Physical deconditioning    - PT/OT consulted.  - Recommend home health PT and rolling walker at discharge           Hyponatremia    - Na 119 on admit.  -Na improving post transplant.  Cont FR 1500 ml.                 Hypotension    - continue Midodrine 15 mg TID.           Severe malnutrition    - dietary consulted.   - temporal and distal extremity muscle wasting and edema  - encourage supplements.        Type 2 diabetes mellitus with diabetic polyneuropathy, with long-term current use of insulin    - continue home regimen.  - endocrine consulted.  -Pt remains on insulin drip at this time           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    - HBsAg+, Received HBIG.  -Tx w/ Entecavir- dose changed to q72 hours given renal function.                VTE Risk Mitigation (From admission, onward)        Ordered     Place sequential compression device  Until discontinued      10/05/18 0709     heparin (porcine) injection 5,000 Units  Every 8 hours      10/01/18 1617          The patients clinical status was discussed at multidisplinary rounds, involving transplant surgery, transplant medicine, pharmacy, nursing, nutrition, and social work    Discharge Planning:  Tentative discharge early next week.      Meghna Maldonado NP  Liver Transplant  [facility]

## 2018-10-13 NOTE — PLAN OF CARE
Problem: Patient Care Overview  Goal: Plan of Care Review  Pt free from fall or injury so far this shift. Instructed to call if assistance needed, verbalized understanding.  Cardiac monitoring in progress, currently SR.   Insulin gtt still infusing at 0.8 units/hr, gtt to be DC'd tonight, and Levamir to start. BG checked AC/HS/0200. Last .   Creat ^ (2.4), 80 mg IVP Lasix ordered BID. Diet changed to a renal diet.   LE's down.   Denies pain or discomfort.   Dulcolax suppository given today, pt had a BM.   No new skin breakdown noted. Pt up to the chair for most of the day.   Afebrile. Hand hygiene reinforced. Daily labs monitored.

## 2018-10-13 NOTE — ASSESSMENT & PLAN NOTE
Avoid insulin stacking and hypoglycemia.  Lab Results   Component Value Date    CREATININE 2.4 (H) 10/13/2018

## 2018-10-13 NOTE — PROGRESS NOTES
"[facility]  Endocrinology  Progress Note    Admit Date: 10/1/2018     Reason for Consult: Management of type 2 DM, Hyperglycemia      Surgical Procedure and Date: Liver transplant 10/5/18, Ex lap 10/6/18     Diabetes diagnosis year:      Home Diabetes Medications:  Lantus 18 units HS and novolog 17 units with meals plus correction scale (150-200 +1)        Lab Results   Component Value Date     HGBA1C 6.8 (H) 2018        How often checking glucose at home? 3-4   BG readings on regimen: 120-150.  Post prandial readings as high as upper 200s  Hypoglycemia on the regimen? yes, none recently   Missed doses on regimen?  No     Diabetes Complications include:     Diabetic peripheral neuropathy      Complicating diabetes co morbidities:   CIRRHOSIS        HPI:  Patient is a 68 y.o. female with a diagnosis of ESLD, listed for liver transplant with meld 23 who underwent live transplant on 10/5/18.  Back to OR on 10/6/18 for ex lap.  Admitted 10/1/18 for hyponatremia s/p paracentesis. She speaks primarily Hungarian. Information obtained from recent past admission and discussion over the phone via  (Dunia). Family not at bedside. Pt request family at bedside for future conversations. Personal has known diagnosis of diabetes, endocrine consulted for diabetes management.        Interval HPI:   Overnight events: remains in TSU. BG above goal overnight on insulin infusion at 0.8 u/hr and scheduled novolog; requiring correction scale. Prednisone 20 mg daily. Creatinine elevated at 2.4. Not feeling well today. Family at beside. Updated via phone with Dunia.   Eatin-  100%  Nausea: No  Hypoglycemia and intervention: No  Fever: No  TPN and/or TF: No    BP (!) 112/55 (BP Location: Right arm, Patient Position: Lying)   Pulse 86   Temp 96.2 °F (35.7 °C) (Axillary)   Resp 18   Ht 5' 3" (1.6 m)   Wt 68.5 kg (151 lb 0.2 oz)   LMP  (LMP Unknown)   SpO2 98%   Breastfeeding? No   BMI 26.75 " kg/m²      Labs Reviewed and Include    Recent Labs   Lab  10/13/18   0530   GLU  181*   CALCIUM  8.5*   ALBUMIN  2.5*   PROT  4.0*   NA  127*   K  4.3   CO2  19*   CL  94*   BUN  110*   CREATININE  2.4*   ALKPHOS  164*   ALT  89*   AST  42*   BILITOT  8.2*     Lab Results   Component Value Date    WBC 10.58 10/13/2018    HGB 8.6 (L) 10/13/2018    HCT 24.7 (L) 10/13/2018    MCV 86 10/13/2018     (L) 10/13/2018     No results for input(s): TSH, FREET4 in the last 168 hours.  Lab Results   Component Value Date    HGBA1C 6.0 (H) 10/04/2018       Nutritional status:   Body mass index is 26.75 kg/m².  Lab Results   Component Value Date    ALBUMIN 2.5 (L) 10/13/2018    ALBUMIN 2.5 (L) 10/12/2018    ALBUMIN 2.9 (L) 10/11/2018     Lab Results   Component Value Date    PREALBUMIN 12 (L) 09/17/2018       Estimated Creatinine Clearance: 20.5 mL/min (A) (based on SCr of 2.4 mg/dL (H)).    Accu-Checks  Recent Labs      10/11/18   1413  10/11/18   1852  10/11/18   2155  10/12/18   0744  10/12/18   0832  10/12/18   1240  10/12/18   1722  10/12/18   2037  10/13/18   0227  10/13/18   0826   POCTGLUCOSE  251*  235*  179*  75  153*  225*  283*  272*  224*  175*       Current Medications and/or Treatments Impacting Glycemic Control  Immunotherapy:    Immunosuppressants         Stop Route Frequency     tacrolimus capsule 0.5 mg      -- Oral Daily     mycophenolate capsule 1,000 mg      -- Oral 2 times daily        Steroids:   Hormones (From admission, onward)    Start     Stop Route Frequency Ordered    10/11/18 0900  predniSONE tablet 20 mg      10/18 0859 Oral Daily 10/10/18 0829        Pressors:    Autonomic Drugs (From admission, onward)    Start     Stop Route Frequency Ordered    10/10/18 1500  midodrine tablet 15 mg      -- Oral 3 times daily 10/10/18 1122    10/06/18 1617  rocuronium (ZEMURON) 10 mg/mL injection     Comments:  Created by cabinet override    10/07 0429   10/06/18 161        Hyperglycemia/Diabetes  Medications:   Antihyperglycemics (From admission, onward)    Start     Stop Route Frequency Ordered    10/12/18 1745  insulin aspart U-100 pen 9 Units      -- SubQ 3 times daily with meals 10/12/18 1740    10/09/18 1535  insulin aspart U-100 pen 2 Units      -- SubQ 3 times daily PRN 10/09/18 1436    10/07/18 1200  insulin regular (Humulin R) 100 Units in sodium chloride 0.9% 100 mL infusion      -- IV Continuous 10/07/18 1055    10/07/18 1155  insulin aspart U-100 pen 0-4 Units      -- SubQ As needed (PRN) 10/07/18 1055          ASSESSMENT and PLAN    * Liver transplanted    Managed per primary team  Avoid hypoglycemia    Recent Labs   Lab  10/13/18   0530   ALT  89*   AST  42*   ALKPHOS  164*   BILITOT  8.2*   PROT  4.0*   ALBUMIN  2.5*               Type 2 diabetes mellitus with diabetic polyneuropathy, with long-term current use of insulin    BG goal 140 - 180     continue transition IV insulin drip at  0.8  U/hr until 2100 then stop IV insulin and start Levemir 18 units HS.   Continue Novolog 9 units with meals.   2 units PRN protein shake  Low dose correction scale insulin  BG monitoring AC/HS/0200        Discharge planning:  TBD        Adverse effect of corticosteroids    On standard steroid taper per transplant team; may elevate BG readings            EVA (acute kidney injury)    Avoid insulin stacking and hypoglycemia.  Lab Results   Component Value Date    CREATININE 2.4 (H) 10/13/2018               Prophylactic immunotherapy    May increase insulin resistance.           Severe malnutrition    Oral intake encouraged, may affect BG readings            Aviva Caldera NP  Endocrinology  [facility]

## 2018-10-13 NOTE — ASSESSMENT & PLAN NOTE
- Valcyte for CMV prophylaxis --> will start at discharge or  POD #10 in hospital  - Bactrim for PCP prophylaxis (MWF)  - Fluconazole for fungal prophylaxis

## 2018-10-13 NOTE — PROGRESS NOTES
[facility]  Nephrology  Progress Note    Patient Name: Scarlett Reddy  MRN: [id number]  Admission Date: 10/1/2018  Hospital Length of Stay: 11 days  Attending Provider: Benson Matson MD   Primary Care Physician: Primary Doctor No  Principal Problem:Liver transplanted    Subjective:     HPI: 69 y/o female with ESLD due to Hep B and D cirrhosis MELD of 23 on 10/01/18.  Paracentesis 10/1 with 5L removed, no fluid sent for analysis. 10/02 significant for hyponatremia, Na 119 and she was admitted to LTS for sodium monitoring. On 10/05/18 she is s/p DBDLT (CMV D+/R+, steroid induction, MMF/tacro/pred maintenance) with take back on 10/6 secondary to hyperbilirubinema, no leak identified. Post-op course complicated by hypercapneic and hypoxic respiratory failure and was intubated 10/06 and extubated 10/07. Liver bx (10/6) sig for Zone 3 hemorrhage and collapse, in addition to cholestasis. ID consulted to comment on positive diphtheroid culture from ascitic fluid (likely skin colonization) as well as ESBL Klebsiella pneumoniae in urine culture (10/4).  Pt asymptomatic and cell count from ascitic fluid unremarkable. Mild peritransplant ascites- repeat US 10/6 with increased arterial resistive indices. Repeat US 10/9 w/ continued elevation in RIs and fluid collections. LFTs trending down nicely.     Patient is currently on entecavir 0.5 mg, Hepagam, mycophenalate 1000 mg BID, prednisone 20 mg and prophylaxis with Bactrim, fluconazole and Valgancyclovir.    Nephrology consulted for acute kidney failure is setting of Orthotopic Liver Transplant. Cr 0.8 on 10/05, started to trend up and 1.5 (10/09), 1.8 (10/10), and 1.9 (10/11). UOP 10/10/18: 520 ml. UA (10/09): 1+ occult blood and 1+ protein with RBC and amorphous casts.        Interval History: NAEON. Pt reports she is feeling improved today and her appetite is getting better. Her LE edema is stable from yesterday. She is urinating, but her UO remains  decreased per her daughter.      Review of patient's allergies indicates:  No Known Allergies  Current Facility-Administered Medications   Medication Frequency    bisacodyl suppository 10 mg Daily PRN    calcium carbonate 200 mg calcium (500 mg) chewable tablet 500 mg TID PRN    dextrose 50% injection 12.5 g PRN    dextrose 50% injection 25 g PRN    [START ON 10/15/2018] entecavir tablet 0.5 mg Q72H    ergocalciferol capsule 50,000 Units Q7 Days    fluconazole tablet 200 mg Daily    furosemide injection 80 mg BID    glucagon (human recombinant) injection 1 mg PRN    glucose chewable tablet 16 g PRN    glucose chewable tablet 24 g PRN    heparin (porcine) injection 5,000 Units Q8H    insulin aspart U-100 pen 0-4 Units PRN    insulin aspart U-100 pen 2 Units TID PRN    insulin aspart U-100 pen 9 Units TIDWM    insulin detemir U-100 pen 18 Units QHS    insulin regular (Humulin R) 100 Units in sodium chloride 0.9% 100 mL infusion Continuous    midodrine tablet 15 mg TID    mirtazapine tablet 7.5 mg QHS    mycophenolate capsule 1,000 mg BID    ondansetron disintegrating tablet 8 mg Q8H PRN    oxyCODONE immediate release tablet 5 mg Q4H PRN    oxyCODONE immediate release tablet Tab 10 mg Q4H PRN    pantoprazole EC tablet 40 mg Daily    polyethylene glycol packet 17 g Daily PRN    predniSONE tablet 20 mg Daily    promethazine (PHENERGAN) 6.25 mg in dextrose 5 % 50 mL IVPB Q6H PRN    sodium bicarbonate tablet 1,300 mg QID    sodium chloride 0.9% flush 3 mL PRN    sulfamethoxazole-trimethoprim 400-80mg per tablet 1 tablet Every Mon, Wed, Fri    tacrolimus capsule 0.5 mg Daily    ursodiol capsule 300 mg BID    [START ON 10/15/2018] valGANciclovir tablet 450 mg Daily     Review of Systems   Constitutional: Negative for chills, fatigue and fever.   Respiratory: Negative for cough and shortness of breath.    Cardiovascular: Positive for leg swelling. Negative for chest pain.   Gastrointestinal:  Positive for abdominal distention. Negative for abdominal pain, constipation, diarrhea and vomiting.   Genitourinary: Negative for decreased urine volume, difficulty urinating and dysuria.   Musculoskeletal: Negative for arthralgias and back pain.   Allergic/Immunologic: Positive for immunocompromised state.   Neurological: Negative for tremors and headaches.      Objective:     Vital Signs (Most Recent):  Temp: 96.2 °F (35.7 °C) (10/13/18 0813)  Pulse: 81 (10/13/18 1110)  Resp: 11 (10/13/18 0815)  BP: 105/67 (10/13/18 0815)  SpO2: 99 % (10/13/18 0815)  O2 Device (Oxygen Therapy): room air (10/13/18 1212) Vital Signs (24h Range):  Temp:  [96.2 °F (35.7 °C)-98 °F (36.7 °C)] 96.2 °F (35.7 °C)  Pulse:  [] 81  Resp:  [11-19] 11  SpO2:  [98 %-100 %] 99 %  BP: ()/(55-67) 105/67     Weight: 68.5 kg (151 lb 0.2 oz) (10/13/18 0444)  Body mass index is 26.75 kg/m².  Body surface area is 1.74 meters squared.    I/O last 3 completed shifts:  In: 1528.9 [P.O.:1370; I.V.:158.9]  Out: 875 [Urine:875]    Physical Exam    Constitutional: She is oriented to person, place, and time. She appears well-developed.   Temporal and distal extremity muscle wasting; yellowing of skin  Eyes: EOM are normal. Pupils are equal, round, and reactive to light. Scleral icterus is present.   Neck: Normal range of motion.   Cardiovascular: Normal rate and regular rhythm.   No murmur heard.  Pulmonary/Chest: Effort normal and breath sounds normal. No respiratory distress. She has no wheezes.   Abdominal: Soft. Bowel sounds are normal. She exhibits distension. There is no tenderness.   Chevron incision w/ staples, CDI  Musculoskeletal: Normal range of motion. She exhibits edema (BL lower extremities).   Neurological: She is alert and oriented to person, place, and time.   Skin: Skin is warm and dry.   Nursing note and vitals reviewed.    Significant Labs:  CBC:   Recent Labs   Lab  10/13/18   0530   WBC  10.58   RBC  2.89*   HGB  8.6*   HCT   24.7*   PLT  116*   MCV  86   MCH  29.8   MCHC  34.8     CMP:   Recent Labs   Lab  10/13/18   0530   GLU  181*   CALCIUM  8.5*   ALBUMIN  2.5*   PROT  4.0*   NA  127*   K  4.3   CO2  19*   CL  94*   BUN  110*   CREATININE  2.4*   ALKPHOS  164*   ALT  89*   AST  42*   BILITOT  8.2*     Recent Labs   Lab  10/09/18   1130   COLORU  Stacy   SPECGRAV  1.020   PHUR  5.0   PROTEINUA  1+*   BACTERIA  None   NITRITE  Negative   LEUKOCYTESUR  Negative   UROBILINOGEN  Negative   HYALINECASTS  0     All labs within the past 24 hours have been reviewed.     Significant Imaging:    10/11/2018 US Retroperitoneal:     FINDINGS:  Right kidney: Normal in size measuring 9.8 cm. Increased cortical echogenicity.  No loss of corticomedullary distinction. Resistive index measures 0.78.  No mass. No renal stone. No hydronephrosis.    Left kidney: Normal in size measuring 10.0 cm. Increased cortical echogenicity.  No loss of corticomedullary distinction. Resistive index measures 0.77.  No mass. No renal stone. No hydronephrosis.    The bladder is partially distended at the time of scanning and has an unremarkable appearance.    Mild volume ascites.      Impression       Upper normal to mildly elevated renal resistive indices suggestive of medical renal disease.    Ascites.     Assessment/Plan:      Post LT - Acute renal failure    Post-LT EVA is multifactorial in origin and has been related to the severity of liver disease, toxicity of immunosuppressive therapy and hepatic ischaemia reperfusion injury might be a driving force in the aetiology of post-LT EVA.      - acute gradual worsening of renal function s/p OLT  - 10/06 was taken to the OR for possibility of billiary leak and post op complication due to hypercapnic hypoxic respiratory failure, no blood pressure drop intraoperatively per anesthesiology notes. Patient has not been needing vasopressors to maintain BP  - Creatinine on arrival 0.8 and has been up trending   - BUN/cr  (10/11/18): 91/1.9; (10/12/18): 100/2.0; (10/13/18): 110/2.4  - Tacrolimus 26.9 (10/08); 6.8 (10/12); 5.6 (10/13)  - Protein Creatine ratio: 0.82  - UOP: 875cc / 24 hrs  - UA consistent with hematuria, proteinuria, RBC and amorphous casts   - Fena 0.5%; consistent with hypovolemia   - Renal US negative for obstruction, stricture or, hydronephrosis    Assessment: Acute Renal failure in setting of hepatic ischemia reperfusion injury and poor perfusion status vs acute tubular necrosis from immunosuppressive agents      Plan:  -  Pt received lasix 100 IV yesterday with UO of 875 cc, continue diureses w/ Lasix 80 mg BID daily.   - spin urine to evaluate ATN  - avoid nephrotoxic drugs, ACEI/ARB, NSAIDS  - adjust drugs to GFR: bactrim   - Repeat tacrolimus levels and consider renal adjustment  - Agree with fluid restriction of 1500 cc/day  - Acute RRT not indicated at this point  - will reassess in am with repeat RFT's and electrolytes, may require HD next week                                Thank you for your consult. I will follow-up with patient. Please contact us if you have any additional questions.    Diana Latif MD  Nephrology  [facility]

## 2018-10-13 NOTE — ASSESSMENT & PLAN NOTE
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      - acute gradual worsening of renal function s/p OLT  - 10/06 was taken to the OR for possibility of billiary leak and post op complication due to hypercapnic hypoxic respiratory failure, no blood pressure drop intraoperatively per anesthesiology notes. Patient has not been needing vasopressors to maintain BP  - Creatinine on arrival 0.8 and has been up trending   - BUN/cr (10/11/18): 91/1.9; (10/12/18): 100/2.0; (10/13/18): 110/2.4  - Tacrolimus 26.9 (10/08); 6.8 (10/12); 5.6 (10/13)  - Protein Creatine ratio: 0.82  - UOP: 875cc / 24 hrs  - UA consistent with hematuria, proteinuria, RBC and amorphous casts   - Fena 0.5%; consistent with hypovolemia   - Renal US negative for obstruction, stricture or, hydronephrosis    Assessment: Acute Renal failure in setting of hepatic ischemia reperfusion injury and poor perfusion status vs acute tubular necrosis from immunosuppressive agents      Plan:  -  Pt received lasix 100 IV yesterday with UO of 875 cc, continue diureses w/ Lasix 80 mg BID daily.   - spin urine to evaluate ATN  - avoid nephrotoxic drugs, ACEI/ARB, NSAIDS  - adjust drugs to GFR: bactrim   - Repeat tacrolimus levels and consider renal adjustment  - Agree with fluid restriction of 1500 cc/day  - Acute RRT not indicated at this point  - will reassess in am with repeat RFT's and electrolytes, may require HD next week

## 2018-10-13 NOTE — ASSESSMENT & PLAN NOTE
- Creatinine on arrival 0.8 and has been up trending   BUN/cr (10/11/18): 91/1.9   Tacrolimus 26.9 (10/08)  Nephrology consulted-- no RRT recommended at this time  Renal US (10/11): no hydronephrosis  Echo 10/12: diastolic dysfunction, likely 2/2 to ARF  CVP 7.  - pt on renal diet w 1500 ml FR.

## 2018-10-13 NOTE — ASSESSMENT & PLAN NOTE
- paracentesis 10/1, 5L removed, no fluid sent for analysis.  - diuresing with albumin and lasix 10/9.  - Per Nephrology, Lasix 80 mg bid and monitor.  No need for RRT.

## 2018-10-13 NOTE — SUBJECTIVE & OBJECTIVE
Interval History: RAQUEL. Pt reports she is feeling improved today and her appetite is getting better. Her LE edema is stable from yesterday. She is urinating, but her UO remains decreased per her daughter.      Review of patient's allergies indicates:  No Known Allergies  Current Facility-Administered Medications   Medication Frequency    bisacodyl suppository 10 mg Daily PRN    calcium carbonate 200 mg calcium (500 mg) chewable tablet 500 mg TID PRN    dextrose 50% injection 12.5 g PRN    dextrose 50% injection 25 g PRN    [START ON 10/15/2018] entecavir tablet 0.5 mg Q72H    ergocalciferol capsule 50,000 Units Q7 Days    fluconazole tablet 200 mg Daily    furosemide injection 80 mg BID    glucagon (human recombinant) injection 1 mg PRN    glucose chewable tablet 16 g PRN    glucose chewable tablet 24 g PRN    heparin (porcine) injection 5,000 Units Q8H    insulin aspart U-100 pen 0-4 Units PRN    insulin aspart U-100 pen 2 Units TID PRN    insulin aspart U-100 pen 9 Units TIDWM    insulin detemir U-100 pen 18 Units QHS    insulin regular (Humulin R) 100 Units in sodium chloride 0.9% 100 mL infusion Continuous    midodrine tablet 15 mg TID    mirtazapine tablet 7.5 mg QHS    mycophenolate capsule 1,000 mg BID    ondansetron disintegrating tablet 8 mg Q8H PRN    oxyCODONE immediate release tablet 5 mg Q4H PRN    oxyCODONE immediate release tablet Tab 10 mg Q4H PRN    pantoprazole EC tablet 40 mg Daily    polyethylene glycol packet 17 g Daily PRN    predniSONE tablet 20 mg Daily    promethazine (PHENERGAN) 6.25 mg in dextrose 5 % 50 mL IVPB Q6H PRN    sodium bicarbonate tablet 1,300 mg QID    sodium chloride 0.9% flush 3 mL PRN    sulfamethoxazole-trimethoprim 400-80mg per tablet 1 tablet Every Mon, Wed, Fri    tacrolimus capsule 0.5 mg Daily    ursodiol capsule 300 mg BID    [START ON 10/15/2018] valGANciclovir tablet 450 mg Daily     Review of Systems   Constitutional: Negative  for chills, fatigue and fever.   Respiratory: Negative for cough and shortness of breath.    Cardiovascular: Positive for leg swelling. Negative for chest pain.   Gastrointestinal: Positive for abdominal distention. Negative for abdominal pain, constipation, diarrhea and vomiting.   Genitourinary: Negative for decreased urine volume, difficulty urinating and dysuria.   Musculoskeletal: Negative for arthralgias and back pain.   Allergic/Immunologic: Positive for immunocompromised state.   Neurological: Negative for tremors and headaches.      Objective:     Vital Signs (Most Recent):  Temp: 96.2 °F (35.7 °C) (10/13/18 0813)  Pulse: 81 (10/13/18 1110)  Resp: 11 (10/13/18 0815)  BP: 105/67 (10/13/18 0815)  SpO2: 99 % (10/13/18 0815)  O2 Device (Oxygen Therapy): room air (10/13/18 1212) Vital Signs (24h Range):  Temp:  [96.2 °F (35.7 °C)-98 °F (36.7 °C)] 96.2 °F (35.7 °C)  Pulse:  [] 81  Resp:  [11-19] 11  SpO2:  [98 %-100 %] 99 %  BP: ()/(55-67) 105/67     Weight: 68.5 kg (151 lb 0.2 oz) (10/13/18 0444)  Body mass index is 26.75 kg/m².  Body surface area is 1.74 meters squared.    I/O last 3 completed shifts:  In: 1528.9 [P.O.:1370; I.V.:158.9]  Out: 875 [Urine:875]    Physical Exam    Constitutional: She is oriented to person, place, and time. She appears well-developed.   Temporal and distal extremity muscle wasting; yellowing of skin  Eyes: EOM are normal. Pupils are equal, round, and reactive to light. Scleral icterus is present.   Neck: Normal range of motion.   Cardiovascular: Normal rate and regular rhythm.   No murmur heard.  Pulmonary/Chest: Effort normal and breath sounds normal. No respiratory distress. She has no wheezes.   Abdominal: Soft. Bowel sounds are normal. She exhibits distension. There is no tenderness.   Chevron incision w/ staples, CDI  Musculoskeletal: Normal range of motion. She exhibits edema (BL lower extremities).   Neurological: She is alert and oriented to person, place, and  time.   Skin: Skin is warm and dry.   Nursing note and vitals reviewed.    Significant Labs:  CBC:   Recent Labs   Lab  10/13/18   0530   WBC  10.58   RBC  2.89*   HGB  8.6*   HCT  24.7*   PLT  116*   MCV  86   MCH  29.8   MCHC  34.8     CMP:   Recent Labs   Lab  10/13/18   0530   GLU  181*   CALCIUM  8.5*   ALBUMIN  2.5*   PROT  4.0*   NA  127*   K  4.3   CO2  19*   CL  94*   BUN  110*   CREATININE  2.4*   ALKPHOS  164*   ALT  89*   AST  42*   BILITOT  8.2*     Recent Labs   Lab  10/09/18   1130   COLORU  Stacy   SPECGRAV  1.020   PHUR  5.0   PROTEINUA  1+*   BACTERIA  None   NITRITE  Negative   LEUKOCYTESUR  Negative   UROBILINOGEN  Negative   HYALINECASTS  0     All labs within the past 24 hours have been reviewed.     Significant Imaging:    10/11/2018 US Retroperitoneal:     FINDINGS:  Right kidney: Normal in size measuring 9.8 cm. Increased cortical echogenicity.  No loss of corticomedullary distinction. Resistive index measures 0.78.  No mass. No renal stone. No hydronephrosis.    Left kidney: Normal in size measuring 10.0 cm. Increased cortical echogenicity.  No loss of corticomedullary distinction. Resistive index measures 0.77.  No mass. No renal stone. No hydronephrosis.    The bladder is partially distended at the time of scanning and has an unremarkable appearance.    Mild volume ascites.      Impression       Upper normal to mildly elevated renal resistive indices suggestive of medical renal disease.    Ascites.

## 2018-10-13 NOTE — SUBJECTIVE & OBJECTIVE
Scheduled Meds:   [START ON 10/15/2018] entecavir  0.5 mg Oral Q72H    ergocalciferol  50,000 Units Oral Q7 Days    fluconazole  200 mg Oral Daily    furosemide  80 mg Intravenous BID    heparin (porcine)  5,000 Units Subcutaneous Q8H    insulin aspart U-100  9 Units Subcutaneous TIDWM    insulin detemir U-100  18 Units Subcutaneous QHS    midodrine  15 mg Oral TID    mirtazapine  7.5 mg Oral QHS    mycophenolate  1,000 mg Oral BID    pantoprazole  40 mg Oral Daily    predniSONE  20 mg Oral Daily    sodium bicarbonate  1,300 mg Oral QID    sulfamethoxazole-trimethoprim 400-80mg  1 tablet Oral Every Mon, Wed, Fri    tacrolimus  0.5 mg Oral Daily    ursodiol  300 mg Oral BID    [START ON 10/15/2018] valGANciclovir  450 mg Oral Daily     Continuous Infusions:   insulin (HUMAN R) infusion (adults) 0.8 Units/hr (10/12/18 1256)     PRN Meds:bisacodyl, calcium carbonate, dextrose 50%, dextrose 50%, glucagon (human recombinant), glucose, glucose, insulin aspart U-100, insulin aspart U-100, ondansetron, oxyCODONE, oxyCODONE, polyethylene glycol, promethazine (PHENERGAN) IVPB, sodium chloride 0.9%    Review of Systems   Constitutional: Positive for activity change. Negative for chills and fever.   Respiratory: Positive for cough. Negative for shortness of breath.    Cardiovascular: Positive for leg swelling. Negative for chest pain.   Gastrointestinal: Positive for abdominal distention and abdominal pain. Negative for constipation, diarrhea, nausea and vomiting.   Genitourinary: Positive for decreased urine volume. Negative for dysuria.   Musculoskeletal: Positive for back pain (lower).   Allergic/Immunologic: Positive for immunocompromised state.   Neurological: Negative for tremors and headaches.   Psychiatric/Behavioral: Negative for confusion.     Objective:     Vital Signs (Most Recent):  Temp: 97.2 °F (36.2 °C) (10/13/18 1630)  Pulse: 80 (10/13/18 1630)  Resp: 16 (10/13/18 1630)  BP: (!) 102/52  (10/13/18 1630)  SpO2: 96 % (10/13/18 1630) Vital Signs (24h Range):  Temp:  [96.2 °F (35.7 °C)-98 °F (36.7 °C)] 97.2 °F (36.2 °C)  Pulse:  [] 80  Resp:  [11-18] 16  SpO2:  [96 %-99 %] 96 %  BP: ()/(52-67) 102/52     Weight: 68.5 kg (151 lb 0.2 oz)  Body mass index is 26.75 kg/m².    Intake/Output - Last 3 Shifts       10/11 0700 - 10/12 0659 10/12 0700 - 10/13 0659 10/13 0700 - 10/14 0659    P.O. 5434 549 5707    I.V. (mL/kg) 42.8 (0.6) 116 (1.7) 8 (0.1)    Total Intake(mL/kg) 1232.8 (18.5) 296 (4.3) 1008 (14.7)    Urine (mL/kg/hr) 0 (0) 875 (0.5) 250 (0.3)    Emesis/NG output 0      Drains       Other 0      Stool 0 0 0    Blood 0      Total Output 0 875 250    Net +1232.8 -579 +758           Urine Occurrence 9 x 0 x 4 x    Stool Occurrence 3 x 0 x 1 x    Emesis Occurrence 0 x            Physical Exam      Laboratory:  Immunosuppressants         Stop Route Frequency     tacrolimus capsule 0.5 mg      -- Oral Daily     mycophenolate capsule 1,000 mg      -- Oral 2 times daily        CBC:   Recent Labs   Lab  10/13/18   0530   WBC  10.58   RBC  2.89*   HGB  8.6*   HCT  24.7*   PLT  116*   MCV  86   MCH  29.8   MCHC  34.8     CMP:   Recent Labs   Lab  10/13/18   0530   GLU  181*   CALCIUM  8.5*   ALBUMIN  2.5*   PROT  4.0*   NA  127*   K  4.3   CO2  19*   CL  94*   BUN  110*   CREATININE  2.4*   ALKPHOS  164*   ALT  89*   AST  42*   BILITOT  8.2*     Labs within the past 24 hours have been reviewed.    Diagnostic Results:  I have personally reviewed all pertinent imaging studies.

## 2018-10-13 NOTE — ASSESSMENT & PLAN NOTE
- dietary consulted.   - temporal and distal extremity muscle wasting and edema  - encourage supplements.

## 2018-10-13 NOTE — ASSESSMENT & PLAN NOTE
Managed per primary team  Avoid hypoglycemia    Recent Labs   Lab  10/13/18   0530   ALT  89*   AST  42*   ALKPHOS  164*   BILITOT  8.2*   PROT  4.0*   ALBUMIN  2.5*

## 2018-10-13 NOTE — SUBJECTIVE & OBJECTIVE
"Interval HPI:   Overnight events: remains in TSU. BG above goal overnight on insulin infusion at 0.8 u/hr and scheduled novolog; requiring correction scale. Prednisone 20 mg daily. Creatinine elevated at 2.4. Not feeling well today. Family at beside. Updated via phone with [facility].   Eatin-  100%  Nausea: No  Hypoglycemia and intervention: No  Fever: No  TPN and/or TF: No    BP (!) 112/55 (BP Location: Right arm, Patient Position: Lying)   Pulse 86   Temp 96.2 °F (35.7 °C) (Axillary)   Resp 18   Ht 5' 3" (1.6 m)   Wt 68.5 kg (151 lb 0.2 oz)   LMP  (LMP Unknown)   SpO2 98%   Breastfeeding? No   BMI 26.75 kg/m²     Labs Reviewed and Include    Recent Labs   Lab  10/13/18   0530   GLU  181*   CALCIUM  8.5*   ALBUMIN  2.5*   PROT  4.0*   NA  127*   K  4.3   CO2  19*   CL  94*   BUN  110*   CREATININE  2.4*   ALKPHOS  164*   ALT  89*   AST  42*   BILITOT  8.2*     Lab Results   Component Value Date    WBC 10.58 10/13/2018    HGB 8.6 (L) 10/13/2018    HCT 24.7 (L) 10/13/2018    MCV 86 10/13/2018     (L) 10/13/2018     No results for input(s): TSH, FREET4 in the last 168 hours.  Lab Results   Component Value Date    HGBA1C 6.0 (H) 10/04/2018       Nutritional status:   Body mass index is 26.75 kg/m².  Lab Results   Component Value Date    ALBUMIN 2.5 (L) 10/13/2018    ALBUMIN 2.5 (L) 10/12/2018    ALBUMIN 2.9 (L) 10/11/2018     Lab Results   Component Value Date    PREALBUMIN 12 (L) 2018       Estimated Creatinine Clearance: 20.5 mL/min (A) (based on SCr of 2.4 mg/dL (H)).    Accu-Checks  Recent Labs      10/11/18   1413  10/11/18   1852  10/11/18   2155  10/12/18   0744  10/12/18   0832  10/12/18   1240  10/12/18   1722  10/12/18   2037  10/13/18   0227  10/13/18   0826   POCTGLUCOSE  251*  235*  179*  75  153*  225*  283*  272*  224*  175*       Current Medications and/or Treatments Impacting Glycemic Control  Immunotherapy:    Immunosuppressants         Stop Route Frequency     tacrolimus " capsule 0.5 mg      -- Oral Daily     mycophenolate capsule 1,000 mg      -- Oral 2 times daily        Steroids:   Hormones (From admission, onward)    Start     Stop Route Frequency Ordered    10/11/18 0900  predniSONE tablet 20 mg      10/18 0859 Oral Daily 10/10/18 0829        Pressors:    Autonomic Drugs (From admission, onward)    Start     Stop Route Frequency Ordered    10/10/18 1500  midodrine tablet 15 mg      -- Oral 3 times daily 10/10/18 1122    10/06/18 1617  rocuronium (ZEMURON) 10 mg/mL injection     Comments:  Created by cabinet override    10/07 0429   10/06/18 1617        Hyperglycemia/Diabetes Medications:   Antihyperglycemics (From admission, onward)    Start     Stop Route Frequency Ordered    10/12/18 1745  insulin aspart U-100 pen 9 Units      -- SubQ 3 times daily with meals 10/12/18 1740    10/09/18 1535  insulin aspart U-100 pen 2 Units      -- SubQ 3 times daily PRN 10/09/18 1436    10/07/18 1200  insulin regular (Humulin R) 100 Units in sodium chloride 0.9% 100 mL infusion      -- IV Continuous 10/07/18 1055    10/07/18 1155  insulin aspart U-100 pen 0-4 Units      -- SubQ As needed (PRN) 10/07/18 1055

## 2018-10-14 PROBLEM — T38.0X5A ADVERSE EFFECT OF CORTICOSTEROIDS: Status: RESOLVED | Noted: 2018-10-05 | Resolved: 2018-10-14

## 2018-10-14 PROBLEM — K65.2 SPONTANEOUS BACTERIAL PERITONITIS: Status: RESOLVED | Noted: 2018-09-19 | Resolved: 2018-10-14

## 2018-10-14 PROBLEM — I81 PORTAL VEIN THROMBOSIS: Status: RESOLVED | Noted: 2018-09-10 | Resolved: 2018-10-14

## 2018-10-14 LAB
ABO + RH BLD: NORMAL
ALBUMIN SERPL BCP-MCNC: 2.2 G/DL
ALP SERPL-CCNC: 166 U/L
ALT SERPL W/O P-5'-P-CCNC: 74 U/L
ANION GAP SERPL CALC-SCNC: 14 MMOL/L
APTT BLDCRRT: 26.3 SEC
AST SERPL-CCNC: 40 U/L
BASOPHILS # BLD AUTO: 0 K/UL
BASOPHILS # BLD AUTO: 0.01 K/UL
BASOPHILS # BLD AUTO: 0.01 K/UL
BASOPHILS NFR BLD: 0 %
BASOPHILS NFR BLD: 0.1 %
BASOPHILS NFR BLD: 0.1 %
BILIRUB SERPL-MCNC: 7.5 MG/DL
BLD GP AB SCN CELLS X3 SERPL QL: NORMAL
BLD PROD TYP BPU: NORMAL
BLOOD UNIT EXPIRATION DATE: NORMAL
BLOOD UNIT TYPE CODE: 6200
BLOOD UNIT TYPE: NORMAL
BUN SERPL-MCNC: 120 MG/DL
CALCIUM SERPL-MCNC: 8.1 MG/DL
CHLORIDE SERPL-SCNC: 91 MMOL/L
CO2 SERPL-SCNC: 20 MMOL/L
CODING SYSTEM: NORMAL
CREAT SERPL-MCNC: 2.6 MG/DL
DIFFERENTIAL METHOD: ABNORMAL
DISPENSE STATUS: NORMAL
EOSINOPHIL # BLD AUTO: 0 K/UL
EOSINOPHIL NFR BLD: 0 %
EOSINOPHIL NFR BLD: 0 %
EOSINOPHIL NFR BLD: 0.1 %
ERYTHROCYTE [DISTWIDTH] IN BLOOD BY AUTOMATED COUNT: 16 %
ERYTHROCYTE [DISTWIDTH] IN BLOOD BY AUTOMATED COUNT: 17.8 %
ERYTHROCYTE [DISTWIDTH] IN BLOOD BY AUTOMATED COUNT: 18 %
EST. GFR  (AFRICAN AMERICAN): 20.9 ML/MIN/1.73 M^2
EST. GFR  (NON AFRICAN AMERICAN): 18.2 ML/MIN/1.73 M^2
GLUCOSE SERPL-MCNC: 116 MG/DL
HCT VFR BLD AUTO: 20.4 %
HCT VFR BLD AUTO: 20.8 %
HCT VFR BLD AUTO: 26.4 %
HGB BLD-MCNC: 6.8 G/DL
HGB BLD-MCNC: 6.9 G/DL
HGB BLD-MCNC: 9.4 G/DL
IMM GRANULOCYTES # BLD AUTO: 0.08 K/UL
IMM GRANULOCYTES # BLD AUTO: 0.09 K/UL
IMM GRANULOCYTES # BLD AUTO: 0.13 K/UL
IMM GRANULOCYTES NFR BLD AUTO: 0.9 %
IMM GRANULOCYTES NFR BLD AUTO: 0.9 %
IMM GRANULOCYTES NFR BLD AUTO: 1.1 %
INR PPP: 1.1
LYMPHOCYTES # BLD AUTO: 0.1 K/UL
LYMPHOCYTES # BLD AUTO: 0.3 K/UL
LYMPHOCYTES # BLD AUTO: 0.3 K/UL
LYMPHOCYTES NFR BLD: 1 %
LYMPHOCYTES NFR BLD: 3.1 %
LYMPHOCYTES NFR BLD: 3.6 %
MAGNESIUM SERPL-MCNC: 1.5 MG/DL
MCH RBC QN AUTO: 28.8 PG
MCH RBC QN AUTO: 29.1 PG
MCH RBC QN AUTO: 29.6 PG
MCHC RBC AUTO-ENTMCNC: 33.2 G/DL
MCHC RBC AUTO-ENTMCNC: 33.3 G/DL
MCHC RBC AUTO-ENTMCNC: 35.6 G/DL
MCV RBC AUTO: 83 FL
MCV RBC AUTO: 87 FL
MCV RBC AUTO: 87 FL
MONOCYTES # BLD AUTO: 0.2 K/UL
MONOCYTES # BLD AUTO: 0.4 K/UL
MONOCYTES # BLD AUTO: 0.5 K/UL
MONOCYTES NFR BLD: 1.6 %
MONOCYTES NFR BLD: 4.6 %
MONOCYTES NFR BLD: 5 %
NEUTROPHILS # BLD AUTO: 11.7 K/UL
NEUTROPHILS # BLD AUTO: 8.3 K/UL
NEUTROPHILS # BLD AUTO: 8.7 K/UL
NEUTROPHILS NFR BLD: 90.7 %
NEUTROPHILS NFR BLD: 91 %
NEUTROPHILS NFR BLD: 96.2 %
NRBC BLD-RTO: 0 /100 WBC
PHOSPHATE SERPL-MCNC: 5.8 MG/DL
PLATELET # BLD AUTO: 106 K/UL
PLATELET # BLD AUTO: 106 K/UL
PLATELET # BLD AUTO: 112 K/UL
PMV BLD AUTO: 11.2 FL
PMV BLD AUTO: 12.1 FL
PMV BLD AUTO: 12.8 FL
POCT GLUCOSE: 179 MG/DL (ref 70–110)
POCT GLUCOSE: 187 MG/DL (ref 70–110)
POCT GLUCOSE: 220 MG/DL (ref 70–110)
POCT GLUCOSE: 85 MG/DL (ref 70–110)
POTASSIUM SERPL-SCNC: 4 MMOL/L
PROT SERPL-MCNC: 3.7 G/DL
PROTHROMBIN TIME: 11.6 SEC
RBC # BLD AUTO: 2.34 M/UL
RBC # BLD AUTO: 2.4 M/UL
RBC # BLD AUTO: 3.18 M/UL
SODIUM SERPL-SCNC: 125 MMOL/L
TACROLIMUS BLD-MCNC: 5.1 NG/ML
TRANS ERYTHROCYTES VOL PATIENT: NORMAL ML
WBC # BLD AUTO: 12.19 K/UL
WBC # BLD AUTO: 9.13 K/UL
WBC # BLD AUTO: 9.54 K/UL

## 2018-10-14 PROCEDURE — 85610 PROTHROMBIN TIME: CPT

## 2018-10-14 PROCEDURE — P9021 RED BLOOD CELLS UNIT: HCPCS

## 2018-10-14 PROCEDURE — 99900035 HC TECH TIME PER 15 MIN (STAT)

## 2018-10-14 PROCEDURE — 63600175 PHARM REV CODE 636 W HCPCS: Performed by: NURSE PRACTITIONER

## 2018-10-14 PROCEDURE — 85025 COMPLETE CBC W/AUTO DIFF WBC: CPT

## 2018-10-14 PROCEDURE — 20600001 HC STEP DOWN PRIVATE ROOM

## 2018-10-14 PROCEDURE — 25000003 PHARM REV CODE 250: Performed by: GENERAL PRACTICE

## 2018-10-14 PROCEDURE — 94761 N-INVAS EAR/PLS OXIMETRY MLT: CPT

## 2018-10-14 PROCEDURE — 25000003 PHARM REV CODE 250: Performed by: TRANSPLANT SURGERY

## 2018-10-14 PROCEDURE — 80053 COMPREHEN METABOLIC PANEL: CPT

## 2018-10-14 PROCEDURE — 25000003 PHARM REV CODE 250: Performed by: PHYSICIAN ASSISTANT

## 2018-10-14 PROCEDURE — 80197 ASSAY OF TACROLIMUS: CPT

## 2018-10-14 PROCEDURE — 86920 COMPATIBILITY TEST SPIN: CPT

## 2018-10-14 PROCEDURE — 25000003 PHARM REV CODE 250: Performed by: NURSE PRACTITIONER

## 2018-10-14 PROCEDURE — 86850 RBC ANTIBODY SCREEN: CPT

## 2018-10-14 PROCEDURE — 90945 DIALYSIS ONE EVALUATION: CPT

## 2018-10-14 PROCEDURE — 25000003 PHARM REV CODE 250: Performed by: STUDENT IN AN ORGANIZED HEALTH CARE EDUCATION/TRAINING PROGRAM

## 2018-10-14 PROCEDURE — 94664 DEMO&/EVAL PT USE INHALER: CPT

## 2018-10-14 PROCEDURE — 63600175 PHARM REV CODE 636 W HCPCS: Performed by: STUDENT IN AN ORGANIZED HEALTH CARE EDUCATION/TRAINING PROGRAM

## 2018-10-14 PROCEDURE — 63600175 PHARM REV CODE 636 W HCPCS: Performed by: SURGERY

## 2018-10-14 PROCEDURE — 84100 ASSAY OF PHOSPHORUS: CPT

## 2018-10-14 PROCEDURE — 99233 SBSQ HOSP IP/OBS HIGH 50: CPT | Mod: 24,,, | Performed by: NURSE PRACTITIONER

## 2018-10-14 PROCEDURE — 27000646 HC AEROBIKA DEVICE

## 2018-10-14 PROCEDURE — 83735 ASSAY OF MAGNESIUM: CPT

## 2018-10-14 PROCEDURE — 85730 THROMBOPLASTIN TIME PARTIAL: CPT

## 2018-10-14 RX ORDER — IBUPROFEN 200 MG
16 TABLET ORAL
Status: DISCONTINUED | OUTPATIENT
Start: 2018-10-14 | End: 2018-12-05

## 2018-10-14 RX ORDER — IBUPROFEN 200 MG
24 TABLET ORAL
Status: DISCONTINUED | OUTPATIENT
Start: 2018-10-14 | End: 2018-12-05

## 2018-10-14 RX ORDER — VALGANCICLOVIR 450 MG/1
450 TABLET, FILM COATED ORAL
Status: DISCONTINUED | OUTPATIENT
Start: 2018-10-15 | End: 2018-10-16

## 2018-10-14 RX ORDER — BISACODYL 5 MG
10 TABLET, DELAYED RELEASE (ENTERIC COATED) ORAL NIGHTLY
Status: DISCONTINUED | OUTPATIENT
Start: 2018-10-14 | End: 2018-10-19

## 2018-10-14 RX ORDER — HYDROCODONE BITARTRATE AND ACETAMINOPHEN 500; 5 MG/1; MG/1
TABLET ORAL
Status: DISCONTINUED | OUTPATIENT
Start: 2018-10-14 | End: 2018-10-16

## 2018-10-14 RX ORDER — GLUCAGON 1 MG
1 KIT INJECTION
Status: DISCONTINUED | OUTPATIENT
Start: 2018-10-14 | End: 2018-12-05

## 2018-10-14 RX ORDER — INSULIN ASPART 100 [IU]/ML
9 INJECTION, SOLUTION INTRAVENOUS; SUBCUTANEOUS
Status: DISCONTINUED | OUTPATIENT
Start: 2018-10-14 | End: 2018-10-15

## 2018-10-14 RX ORDER — INSULIN ASPART 100 [IU]/ML
0-5 INJECTION, SOLUTION INTRAVENOUS; SUBCUTANEOUS
Status: DISCONTINUED | OUTPATIENT
Start: 2018-10-14 | End: 2018-12-01

## 2018-10-14 RX ORDER — MAGNESIUM SULFATE HEPTAHYDRATE 40 MG/ML
2 INJECTION, SOLUTION INTRAVENOUS
Status: DISPENSED | OUTPATIENT
Start: 2018-10-14 | End: 2018-10-15

## 2018-10-14 RX ORDER — HYDROCODONE BITARTRATE AND ACETAMINOPHEN 500; 5 MG/1; MG/1
TABLET ORAL CONTINUOUS
Status: ACTIVE | OUTPATIENT
Start: 2018-10-14 | End: 2018-10-15

## 2018-10-14 RX ADMIN — SODIUM BICARBONATE 650 MG TABLET 1300 MG: at 08:10

## 2018-10-14 RX ADMIN — INSULIN ASPART 9 UNITS: 100 INJECTION, SOLUTION INTRAVENOUS; SUBCUTANEOUS at 01:10

## 2018-10-14 RX ADMIN — URSODIOL 300 MG: 300 CAPSULE ORAL at 08:10

## 2018-10-14 RX ADMIN — INSULIN ASPART 2 UNITS: 100 INJECTION, SOLUTION INTRAVENOUS; SUBCUTANEOUS at 06:10

## 2018-10-14 RX ADMIN — MYCOPHENOLATE MOFETIL 1000 MG: 250 CAPSULE ORAL at 08:10

## 2018-10-14 RX ADMIN — FLUCONAZOLE 200 MG: 200 TABLET ORAL at 08:10

## 2018-10-14 RX ADMIN — DOCUSATE SODIUM 50 MG: 50 CAPSULE, LIQUID FILLED ORAL at 03:10

## 2018-10-14 RX ADMIN — INSULIN ASPART 9 UNITS: 100 INJECTION, SOLUTION INTRAVENOUS; SUBCUTANEOUS at 06:10

## 2018-10-14 RX ADMIN — SODIUM BICARBONATE 650 MG TABLET 1300 MG: at 05:10

## 2018-10-14 RX ADMIN — PANTOPRAZOLE SODIUM 40 MG: 40 TABLET, DELAYED RELEASE ORAL at 08:10

## 2018-10-14 RX ADMIN — SODIUM CHLORIDE: 0.9 INJECTION, SOLUTION INTRAVENOUS at 10:10

## 2018-10-14 RX ADMIN — TACROLIMUS 0.5 MG: 0.5 CAPSULE ORAL at 08:10

## 2018-10-14 RX ADMIN — HEPARIN SODIUM 5000 UNITS: 5000 INJECTION, SOLUTION INTRAVENOUS; SUBCUTANEOUS at 08:10

## 2018-10-14 RX ADMIN — PREDNISONE 20 MG: 10 TABLET ORAL at 08:10

## 2018-10-14 RX ADMIN — HEPARIN SODIUM 5000 UNITS: 5000 INJECTION, SOLUTION INTRAVENOUS; SUBCUTANEOUS at 01:10

## 2018-10-14 RX ADMIN — MIDODRINE HYDROCHLORIDE 15 MG: 5 TABLET ORAL at 08:10

## 2018-10-14 RX ADMIN — HEPARIN SODIUM 5000 UNITS: 5000 INJECTION, SOLUTION INTRAVENOUS; SUBCUTANEOUS at 05:10

## 2018-10-14 RX ADMIN — SODIUM BICARBONATE 650 MG TABLET 1300 MG: at 01:10

## 2018-10-14 RX ADMIN — MIDODRINE HYDROCHLORIDE 15 MG: 5 TABLET ORAL at 03:10

## 2018-10-14 RX ADMIN — MIRTAZAPINE 7.5 MG: 7.5 TABLET ORAL at 08:10

## 2018-10-14 RX ADMIN — INSULIN ASPART 1 UNITS: 100 INJECTION, SOLUTION INTRAVENOUS; SUBCUTANEOUS at 08:10

## 2018-10-14 RX ADMIN — INSULIN DETEMIR 14 UNITS: 100 INJECTION, SOLUTION SUBCUTANEOUS at 08:10

## 2018-10-14 RX ADMIN — DOCUSATE SODIUM 50 MG: 50 CAPSULE, LIQUID FILLED ORAL at 08:10

## 2018-10-14 NOTE — ASSESSMENT & PLAN NOTE
Avoid insulin stacking and hypoglycemia.  Lab Results   Component Value Date    CREATININE 2.6 (H) 10/14/2018

## 2018-10-14 NOTE — PLAN OF CARE
Problem: Patient Care Overview  Goal: Plan of Care Review  Outcome: Ongoing (interventions implemented as appropriate)  AAOX4.  VSS.  No complaints of pain.  Insulin drip DC'd at 2100 and started on levemir.  Lisa SOUZA, HS, 0200, last BS was 187.  Pt ambulating to the bathroom. Family is at the bedside.  Bed is in lowest position, call bell is in reach, and pt instructed to call nurse when needing assistance.  Will continue to monitor.

## 2018-10-14 NOTE — SUBJECTIVE & OBJECTIVE
Scheduled Meds:   bisacodyl  10 mg Oral QHS    docusate sodium  50 mg Oral TID    [START ON 10/15/2018] entecavir  0.5 mg Oral Q72H    ergocalciferol  50,000 Units Oral Q7 Days    fluconazole  200 mg Oral Daily    heparin (porcine)  5,000 Units Subcutaneous Q8H    insulin aspart U-100  9 Units Subcutaneous TIDWM    insulin detemir U-100  14 Units Subcutaneous QHS    midodrine  15 mg Oral TID    mirtazapine  7.5 mg Oral QHS    mycophenolate  1,000 mg Oral BID    pantoprazole  40 mg Oral Daily    predniSONE  20 mg Oral Daily    sodium bicarbonate  1,300 mg Oral QID    sulfamethoxazole-trimethoprim 400-80mg  1 tablet Oral Every Mon, Wed, Fri    tacrolimus  0.5 mg Oral Daily    ursodiol  300 mg Oral BID    [START ON 10/15/2018] valGANciclovir  450 mg Oral Every Mon, Wed, Fri     Continuous Infusions:   sodium chloride 0.9%       PRN Meds:sodium chloride, bisacodyl, calcium carbonate, dextrose 50%, dextrose 50%, glucagon (human recombinant), glucose, glucose, insulin aspart U-100, magnesium sulfate IVPB, ondansetron, oxyCODONE, oxyCODONE, polyethylene glycol, promethazine (PHENERGAN) IVPB, sodium chloride 0.9%, sodium phosphate IVPB, sodium phosphate IVPB, sodium phosphate IVPB    Review of Systems   Constitutional: Positive for activity change. Negative for chills and fever.   Respiratory: Positive for cough. Negative for shortness of breath.    Cardiovascular: Positive for leg swelling. Negative for chest pain.   Gastrointestinal: Positive for abdominal distention and abdominal pain. Negative for constipation, diarrhea, nausea and vomiting.   Genitourinary: Positive for decreased urine volume. Negative for dysuria.   Musculoskeletal: Positive for back pain (lower).   Allergic/Immunologic: Positive for immunocompromised state.   Neurological: Negative for tremors and headaches.   Psychiatric/Behavioral: Negative for confusion.     Objective:     Vital Signs (Most Recent):  Temp: 97.6 °F (36.4 °C)  (10/14/18 1635)  Pulse: 86 (10/14/18 1530)  Resp: 11 (10/14/18 1530)  BP: 106/67 (10/14/18 1530)  SpO2: 99 % (10/14/18 1530) Vital Signs (24h Range):  Temp:  [96.9 °F (36.1 °C)-97.9 °F (36.6 °C)] 97.6 °F (36.4 °C)  Pulse:  [74-88] 86  Resp:  [10-19] 11  SpO2:  [97 %-100 %] 99 %  BP: ()/(47-73) 106/67     Weight: 66.3 kg (146 lb 2.6 oz)  Body mass index is 25.89 kg/m².    Intake/Output - Last 3 Shifts       10/12 0700 - 10/13 0659 10/13 0700 - 10/14 0659 10/14 0700 - 10/15 0659    P.O. 180 1180 500    I.V. (mL/kg) 116 (1.7) 8 (0.1)     Blood   219    Total Intake(mL/kg) 296 (4.3) 1188 (17.9) 719 (10.8)    Urine (mL/kg/hr) 875 (0.5) 850 (0.5) 345 (0.5)    Emesis/NG output       Other       Stool 0 0     Blood       Total Output 875 850 345    Net -579 +338 +374           Urine Occurrence 0 x 4 x     Stool Occurrence 0 x 2 x           Physical Exam      Laboratory:  Immunosuppressants         Stop Route Frequency     tacrolimus capsule 0.5 mg      -- Oral Daily     mycophenolate capsule 1,000 mg      -- Oral 2 times daily        CBC:   Recent Labs   Lab  10/14/18   0648   WBC  9.54   RBC  2.40*   HGB  6.9*   HCT  20.8*   PLT  106*   MCV  87   MCH  28.8   MCHC  33.2     CMP:   Recent Labs   Lab  10/14/18   0520   GLU  116*   CALCIUM  8.1*   ALBUMIN  2.2*   PROT  3.7*   NA  125*   K  4.0   CO2  20*   CL  91*   BUN  120*   CREATININE  2.6*   ALKPHOS  166*   ALT  74*   AST  40   BILITOT  7.5*     Labs within the past 24 hours have been reviewed.    Diagnostic Results:  I have personally reviewed all pertinent imaging studies.

## 2018-10-14 NOTE — ASSESSMENT & PLAN NOTE
Managed per primary team  Avoid hypoglycemia    Recent Labs   Lab  10/14/18   0520   ALT  74*   AST  40   ALKPHOS  166*   BILITOT  7.5*   PROT  3.7*   ALBUMIN  2.2*

## 2018-10-14 NOTE — PROGRESS NOTES
[facility]  Liver Transplant  Progress Note    Patient Name: Scarlett Reddy  MRN: [id number]  Admission Date: 10/1/2018  Hospital Length of Stay: 12 days  Code Status: Full Code  Primary Care Provider: Primary Doctor No  Post-Operative Day: 9    ORGAN:   LIVER  Disease Etiology: Cirrhosis: Type B and D  Donor Type:    - Brain Death  CDC High Risk:   No  Donor CMV Status:   Donor CMV Status: Positive  Donor HBcAB:   Negative  Donor HCV Status:   Negative  Donor HBV SETH: Negative  Donor HCV SETH: Negative  Whole or Partial: Whole Liver  Biliary Anastomosis: End to End  Arterial Anatomy: Replaced Left Hepatic and Right Hepatic  Subjective:     History of Present Illness:  Ms. Reddy is a 67 y/o female with PMH of ESLD secondary Hep B and D.  Listed for liver transplant with MELD 23.  Paracentesis 10/1 with 5L removed, no fluid sent for analysis.  Morning labs significant for hyponatremia, Na 119.  Pt admitted to LTS for sodium monitoring.        Hospital Course:  Hospital Course: 67 y/o F h/o HBV/delta cirrhosis s/p DBDLT 10/5/18 (CMV D+/R+, steroid induction, MMF/tacro/pred maintenance) with take back on 10/6 2/2 hyperbilirubinema, no leak identified. Post-op course c/b hypercapneic and hypoxic respiratory failure and was reintubated though quickly extubated now doing well. Liver bx (10/6) sig for Zone 3 hemorrhage and collapse, in addition to cholestasis. Transferred from ICU on POD #4. ID consulted to comment on positive diphtheroid culture from ascitic fluid (likely skin colonization) as well as ESBL Klebsiella pneumoniae in urine culture (10/4).  Pt asymptomatic and cell count from ascitic fluid unremarkable. Per ID recs, no need to treat diphtheroids or asymptomatic ESBL Kleb pneumo bacteriuria though pt has had 6 days of therapy which would cover these organisms. Mild peritransplant ascites- repeat US 10/6 with increased arterial resistive indices. Repeat US 10/9 w/ continued elevation in  RIs and fluid collections. LFTs trending down nicely. Acute gradual worsening of renal function s/p OLT.Creatinine on arrival 0.8 and has been up trending. Nephrology consulted for post-op EVA.     Interval history: c/o increased abdominal distention this am.  .  S/W Nephrology.  Plan for CRRT this evening.  Pt on max dose of midodrine.  Held Lasix.  Na 125.  FR changed to 1L.  Potassium 5.2.  Pt is on renal/low K+ diet.  Ben is bringing in fruit including bananas.  Instructed family no bananas.  Will get dietician to s/w family again.  LFTs trending down.  Will plan for liver US in am to assess liver and to assess pt c/o right side pain.  Cont strict I/O's.  Encourage IS and OOB.  Monitor.      Scheduled Meds:   bisacodyl  10 mg Oral QHS    docusate sodium  50 mg Oral TID    [START ON 10/15/2018] entecavir  0.5 mg Oral Q72H    ergocalciferol  50,000 Units Oral Q7 Days    fluconazole  200 mg Oral Daily    heparin (porcine)  5,000 Units Subcutaneous Q8H    insulin aspart U-100  9 Units Subcutaneous TIDWM    insulin detemir U-100  14 Units Subcutaneous QHS    midodrine  15 mg Oral TID    mirtazapine  7.5 mg Oral QHS    mycophenolate  1,000 mg Oral BID    pantoprazole  40 mg Oral Daily    predniSONE  20 mg Oral Daily    sodium bicarbonate  1,300 mg Oral QID    sulfamethoxazole-trimethoprim 400-80mg  1 tablet Oral Every Mon, Wed, Fri    tacrolimus  0.5 mg Oral Daily    ursodiol  300 mg Oral BID    [START ON 10/15/2018] valGANciclovir  450 mg Oral Every Mon, Wed, Fri     Continuous Infusions:   sodium chloride 0.9%       PRN Meds:sodium chloride, bisacodyl, calcium carbonate, dextrose 50%, dextrose 50%, glucagon (human recombinant), glucose, glucose, insulin aspart U-100, magnesium sulfate IVPB, ondansetron, oxyCODONE, oxyCODONE, polyethylene glycol, promethazine (PHENERGAN) IVPB, sodium chloride 0.9%, sodium phosphate IVPB, sodium phosphate IVPB, sodium phosphate IVPB    Review of Systems    Constitutional: Positive for activity change. Negative for chills and fever.   Respiratory: Positive for cough. Negative for shortness of breath.    Cardiovascular: Positive for leg swelling. Negative for chest pain.   Gastrointestinal: Positive for abdominal distention and abdominal pain. Negative for constipation, diarrhea, nausea and vomiting.   Genitourinary: Positive for decreased urine volume. Negative for dysuria.   Musculoskeletal: Positive for back pain (lower).   Allergic/Immunologic: Positive for immunocompromised state.   Neurological: Negative for tremors and headaches.   Psychiatric/Behavioral: Negative for confusion.     Objective:     Vital Signs (Most Recent):  Temp: 97.6 °F (36.4 °C) (10/14/18 1635)  Pulse: 86 (10/14/18 1530)  Resp: 11 (10/14/18 1530)  BP: 106/67 (10/14/18 1530)  SpO2: 99 % (10/14/18 1530) Vital Signs (24h Range):  Temp:  [96.9 °F (36.1 °C)-97.9 °F (36.6 °C)] 97.6 °F (36.4 °C)  Pulse:  [74-88] 86  Resp:  [10-19] 11  SpO2:  [97 %-100 %] 99 %  BP: ()/(47-73) 106/67     Weight: 66.3 kg (146 lb 2.6 oz)  Body mass index is 25.89 kg/m².    Intake/Output - Last 3 Shifts       10/12 0700 - 10/13 0659 10/13 0700 - 10/14 0659 10/14 0700 - 10/15 0659    P.O. 180 1180 500    I.V. (mL/kg) 116 (1.7) 8 (0.1)     Blood   219    Total Intake(mL/kg) 296 (4.3) 1188 (17.9) 719 (10.8)    Urine (mL/kg/hr) 875 (0.5) 850 (0.5) 345 (0.5)    Emesis/NG output       Other       Stool 0 0     Blood       Total Output 875 850 345    Net -579 +338 +374           Urine Occurrence 0 x 4 x     Stool Occurrence 0 x 2 x           Physical Exam      Laboratory:  Immunosuppressants         Stop Route Frequency     tacrolimus capsule 0.5 mg      -- Oral Daily     mycophenolate capsule 1,000 mg      -- Oral 2 times daily        CBC:   Recent Labs   Lab  10/14/18   0648   WBC  9.54   RBC  2.40*   HGB  6.9*   HCT  20.8*   PLT  106*   MCV  87   MCH  28.8   MCHC  33.2     CMP:   Recent Labs   Lab  10/14/18   0548    GLU  116*   CALCIUM  8.1*   ALBUMIN  2.2*   PROT  3.7*   NA  125*   K  4.0   CO2  20*   CL  91*   BUN  120*   CREATININE  2.6*   ALKPHOS  166*   ALT  74*   AST  40   BILITOT  7.5*     Labs within the past 24 hours have been reviewed.    Diagnostic Results:  I have personally reviewed all pertinent imaging studies.    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   -POD 1 US: increased arterial resistive indices, suggestive of edema vs cute rejection vs congestion  -Repeat US 10/9 with continued elevation of RIs  -LFTs trending down  -T bili trending down  -repeat liver US (10/11): elevated RIs  - LFTs yet to normalize but are trending down.  - pt c/o right side pain, likely from dependent edema but will get US tomorrow to reassess.             Post LT - Acute renal failure    - Creatinine on arrival 0.8 and has been up trending   BUN/cr (10/11/18): 91/1.9   Tacrolimus 26.9 (10/08)  Nephrology consulted-- no RRT recommended at this time  Renal US (10/11): no hydronephrosis  Echo 10/12: diastolic dysfunction, likely 2/2 to ARF  CVP 7.  - pt on renal diet w 1500 ml FR.          Hypervolemia associated with renal insufficiency    - Diuresed w/ Lasix 100mg x 1 (10/12).  - started Lasix 80 mg bid per Nephrology 10/12.  - plan for CRRT 10/14.  .  Hold Lasix due to hypotension.            EVA (acute kidney injury)              Metabolic acidosis    - Started Na bicarb 1300 QID.  -  Plan for CRRT this evening.          At risk for opportunistic infections    - Valcyte for CMV prophylaxis --> will start at discharge or  POD #10 in hospital  - Bactrim for PCP prophylaxis (MWF)  - Fluconazole for fungal prophylaxis           Long-term use of immunosuppressant medication    -  Cellcept/Prograf/pred  -  Held prograf 10/8 for elevated levels  -  Resumed  Prograf 10/10/19 at dose of 0.5 mg.  Will adjust as needed.   -  Will monitor for signs of toxic side effects, check daily tacrolimus troughs, and change meds accordingly.          Prophylactic immunotherapy    - See long term use immunosuppresion          Ascites    - paracentesis 10/1, 5L removed, no fluid sent for analysis.  - diuresing with albumin and lasix 10/9.  - Per Nephrology, on 10/13- Lasix 80 mg bid and monitor.  No need for RRT.  -  10/14, pt w worsening edema.  , .  Nephr plan for CRRT this evening.           Weakness    - see physical decond.          Physical deconditioning    - PT/OT consulted.  - Recommend home health PT and rolling walker at discharge           Hyponatremia    - Na 119 on admit.  -Na was improving post transplant w HD.  - Pt POD #9 w EVA, Na worse today at 125.  Changed FR to 1L.  - s/w Nephrology, plan for CRRT this evening.  Apprec Neph recs                 Hypotension    - continue Midodrine 15 mg TID.   Monitor.          Anemia requiring transfusions    - pt w decreased H/H.  Plan to transfuse 1 u PRBC today and recheck CBC later.  - pt c/o right side pain but no other signs of overt blood loss.  Will check US in am and monitor.          Severe malnutrition    - dietary consulted.   - temporal and distal extremity muscle wasting and edema  - encourage supplements.  - will re consult dietician again to review renal/low K+ diet        Type 2 diabetes mellitus with diabetic polyneuropathy, with long-term current use of insulin    - continue home regimen.  - endocrine consulted.  -Pt off insulin drip at this time.  Apprec endo recs.           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  -Tx w/ Entecavir- dose changed to q72 hours given renal function.                VTE Risk Mitigation (From admission, onward)        Ordered     Place sequential compression device  Until discontinued      10/05/18 0709     heparin (porcine)  injection 5,000 Units  Every 8 hours      10/01/18 0045          The patients clinical status was discussed at multidisplinary rounds, involving transplant surgery, transplant medicine, pharmacy, nursing, nutrition, and social work    Discharge Planning:  No plan for d/c.    Meghna Maldonado NP  Liver Transplant  [facility]

## 2018-10-14 NOTE — ASSESSMENT & PLAN NOTE
- continue home regimen.  - endocrine consulted.  -Pt off insulin drip at this time.  Apprec endo recs.

## 2018-10-14 NOTE — ASSESSMENT & PLAN NOTE
- dietary consulted.   - temporal and distal extremity muscle wasting and edema  - encourage supplements.  - will re consult dietician again to review renal/low K+ diet

## 2018-10-14 NOTE — ASSESSMENT & PLAN NOTE
- Na 119 on admit.  -Na was improving post transplant w HD.  - Pt POD #9 w EVA, Na worse today at 125.  Changed FR to 1L.  - s/w Nephrology, plan for CRRT this evening.  Apprec Neph recs

## 2018-10-14 NOTE — CARE UPDATE
BG goal 140-180.       BG at or slightly above goal overnight; continue bg monitoring ac/hs and low dose correction. Continue novolog 9 units with meals and 2 units if drinking boost. FBG below goal; decrease Levemir 14 units HS.       ** Please call Endocrine for any BG related issues **

## 2018-10-14 NOTE — ASSESSMENT & PLAN NOTE
-  Cellcept/Prograf/pred  -  Held prograf 10/8 for elevated levels  -  Resumed Prograf 10/10/19 at dose of 0.5 mg.  Will adjust as needed.   -  Will monitor for signs of toxic side effects, check daily tacrolimus troughs, and change meds accordingly.

## 2018-10-14 NOTE — ASSESSMENT & PLAN NOTE
- Diuresed w/ Lasix 100mg x 1 (10/12).  - started Lasix 80 mg bid per Nephrology 10/12.  - plan for CRRT 10/14.  .  Hold Lasix due to hypotension.

## 2018-10-14 NOTE — ASSESSMENT & PLAN NOTE
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   -POD 1 US: increased arterial resistive indices, suggestive of edema vs cute rejection vs congestion  -Repeat US 10/9 with continued elevation of RIs  -LFTs trending down  -T bili trending down  -repeat liver US (10/11): elevated RIs  - LFTs yet to normalize but are trending down.  - pt c/o right side pain, likely from dependent edema but will get US tomorrow to reassess.

## 2018-10-14 NOTE — ASSESSMENT & PLAN NOTE
- pt w decreased H/H.  Plan to transfuse 1 u PRBC today and recheck CBC later.  - pt c/o right side pain but no other signs of overt blood loss.  Will check US in am and monitor.

## 2018-10-14 NOTE — SUBJECTIVE & OBJECTIVE
"Interval HPI:   Overnight events: remains in TSU. . FBG remains below goal with decreased levemir dose. Dialysis today- may impact BG.  Prednisone 20 mg daily; standard steroid taper.   Eatin-50% of meals; boost between meals   Nausea: No  Hypoglycemia and intervention: No  Fever: No  TPN and/or TF: No    /72   Pulse 84   Temp 97.7 °F (36.5 °C)   Resp 10   Ht 5' 3" (1.6 m)   Wt 66.3 kg (146 lb 2.6 oz)   LMP  (LMP Unknown)   SpO2 98%   Breastfeeding? No   BMI 25.89 kg/m²     Labs Reviewed and Include    Recent Labs   Lab  10/15/18   0402   GLU  108  108  108   CALCIUM  7.7*  7.7*  7.7*   ALBUMIN  2.3*  2.3*  2.3*   PROT  3.9*   NA  131*  131*  131*   K  3.7  3.7  3.7   CO2  26  26  26   CL  95  95  95   BUN  70*  70*  70*   CREATININE  1.6*  1.6*  1.6*   ALKPHOS  178*   ALT  68*   AST  39   BILITOT  6.9*     Lab Results   Component Value Date    WBC 12.29 10/15/2018    HGB 8.7 (L) 10/15/2018    HCT 25.0 (L) 10/15/2018    MCV 85 10/15/2018    PLT 91 (L) 10/15/2018     No results for input(s): TSH, FREET4 in the last 168 hours.  Lab Results   Component Value Date    HGBA1C 6.0 (H) 10/04/2018       Nutritional status:   Body mass index is 25.89 kg/m².  Lab Results   Component Value Date    ALBUMIN 2.3 (L) 10/15/2018    ALBUMIN 2.3 (L) 10/15/2018    ALBUMIN 2.3 (L) 10/15/2018     Lab Results   Component Value Date    PREALBUMIN 12 (L) 2018       Estimated Creatinine Clearance: 30.4 mL/min (A) (based on SCr of 1.6 mg/dL (H)).    Accu-Checks  Recent Labs      10/12/18   1240  10/12/18   1722  10/12/18   2037  10/13/18   0227  10/13/18   0826  10/13/18   1328  10/13/18   1917  10/14/18   0308  10/14/18   0826  10/14/18   2053   POCTGLUCOSE  225*  283*  272*  224*  175*  197*  187*  179*  85  220*       Current Medications and/or Treatments Impacting Glycemic Control  Immunotherapy:    Immunosuppressants         Stop Route Frequency     tacrolimus capsule 0.5 mg      -- Oral Daily "     mycophenolate capsule 1,000 mg      -- Oral 2 times daily        Steroids:   Hormones (From admission, onward)    Start     Stop Route Frequency Ordered    10/11/18 0900  predniSONE tablet 20 mg      10/18 0859 Oral Daily 10/10/18 0829        Pressors:    Autonomic Drugs (From admission, onward)    Start     Stop Route Frequency Ordered    10/10/18 1500  midodrine tablet 15 mg      -- Oral 3 times daily 10/10/18 1122    10/06/18 1617  rocuronium (ZEMURON) 10 mg/mL injection     Comments:  Created by cabinet override    10/07 0429   10/06/18 1617        Hyperglycemia/Diabetes Medications:   Antihyperglycemics (From admission, onward)    Start     Stop Route Frequency Ordered    10/14/18 2100  insulin detemir U-100 pen 14 Units      -- SubQ Nightly 10/14/18 0825    10/14/18 1130  insulin aspart U-100 pen 9 Units      -- SubQ 3 times daily with meals 10/14/18 0825    10/14/18 0925  insulin aspart U-100 pen 0-5 Units      -- SubQ Before meals & nightly PRN 10/14/18 0825    10/07/18 1200  insulin regular (Humulin R) 100 Units in sodium chloride 0.9% 100 mL infusion      10/13 2100 IV Continuous 10/07/18 1055

## 2018-10-14 NOTE — ASSESSMENT & PLAN NOTE
- paracentesis 10/1, 5L removed, no fluid sent for analysis.  - diuresing with albumin and lasix 10/9.  - Per Nephrology, on 10/13- Lasix 80 mg bid and monitor.  No need for RRT.  -  10/14, pt w worsening edema.  , .  Nephr plan for CRRT this evening.

## 2018-10-14 NOTE — PLAN OF CARE
Problem: Patient Care Overview  Goal: Plan of Care Review  Pt free from fall or injury so far this shift. Instructed to call if assistance needed, verbalized understanding.  Cardiac monitoring in progress, currently SR.   BG 85 this AM. Levamir dose decreased to 14 units, and SSI coverage given if BG >201. BG checked AC/HS. Last .  H&H 6.8/20.4, repeat 6.9/20.8, 1 unit PRBC's given.   LE's down. Pt leaking from RLQ suture site, new dressing applied twice.   Creat ^ (2.6). Lasix DC'd. Pt will get CRRT tonight for 6 hrs. 1 L FR ordered.   Denies pain or discomfort.   No new skin breakdown noted. Pt able to adjust position independently.   Afebrile. Hand hygiene reinforced. Daily labs monitored.

## 2018-10-14 NOTE — ASSESSMENT & PLAN NOTE
BG goal 140 - 180     Decrease to Levemir 12 units HS.   Continue Novolog 9 units with meals.   2 units PRN protein shake  Low dose correction scale insulin  BG monitoring AC/HS/0200        Discharge planning:  TBD

## 2018-10-15 LAB
ALBUMIN SERPL BCP-MCNC: 2.3 G/DL
ALP SERPL-CCNC: 178 U/L
ALT SERPL W/O P-5'-P-CCNC: 68 U/L
ANION GAP SERPL CALC-SCNC: 10 MMOL/L
APTT BLDCRRT: 23.5 SEC
AST SERPL-CCNC: 39 U/L
BACTERIA SPEC ANAEROBE CULT: NORMAL
BASOPHILS # BLD AUTO: 0.02 K/UL
BASOPHILS NFR BLD: 0.2 %
BILIRUB SERPL-MCNC: 6.9 MG/DL
BILIRUB UR QL STRIP: NEGATIVE
BUN SERPL-MCNC: 70 MG/DL
CALCIUM SERPL-MCNC: 7.7 MG/DL
CHLORIDE SERPL-SCNC: 95 MMOL/L
CLARITY UR REFRACT.AUTO: ABNORMAL
CO2 SERPL-SCNC: 26 MMOL/L
COLOR UR AUTO: ABNORMAL
CREAT SERPL-MCNC: 1.6 MG/DL
DIFFERENTIAL METHOD: ABNORMAL
EOSINOPHIL # BLD AUTO: 0 K/UL
EOSINOPHIL NFR BLD: 0 %
ERYTHROCYTE [DISTWIDTH] IN BLOOD BY AUTOMATED COUNT: 16.1 %
EST. GFR  (AFRICAN AMERICAN): 37.6 ML/MIN/1.73 M^2
EST. GFR  (NON AFRICAN AMERICAN): 32.6 ML/MIN/1.73 M^2
GLUCOSE SERPL-MCNC: 108 MG/DL
GLUCOSE UR QL STRIP: NEGATIVE
HCT VFR BLD AUTO: 25 %
HGB BLD-MCNC: 8.7 G/DL
HGB UR QL STRIP: NEGATIVE
IMM GRANULOCYTES # BLD AUTO: 0.12 K/UL
IMM GRANULOCYTES NFR BLD AUTO: 1 %
INR PPP: 1
KETONES UR QL STRIP: NEGATIVE
LEUKOCYTE ESTERASE UR QL STRIP: NEGATIVE
LYMPHOCYTES # BLD AUTO: 0.3 K/UL
LYMPHOCYTES NFR BLD: 2.3 %
MAGNESIUM SERPL-MCNC: 1.5 MG/DL
MAGNESIUM SERPL-MCNC: 1.5 MG/DL
MCH RBC QN AUTO: 29.4 PG
MCHC RBC AUTO-ENTMCNC: 34.8 G/DL
MCV RBC AUTO: 85 FL
MONOCYTES # BLD AUTO: 0.5 K/UL
MONOCYTES NFR BLD: 4.1 %
NEUTROPHILS # BLD AUTO: 11.4 K/UL
NEUTROPHILS NFR BLD: 92.4 %
NITRITE UR QL STRIP: NEGATIVE
NRBC BLD-RTO: 0 /100 WBC
PH UR STRIP: 5 [PH] (ref 5–8)
PHOSPHATE SERPL-MCNC: 3.9 MG/DL
PLATELET # BLD AUTO: 91 K/UL
PMV BLD AUTO: 11.4 FL
POCT GLUCOSE: 126 MG/DL (ref 70–110)
POCT GLUCOSE: 240 MG/DL (ref 70–110)
POCT GLUCOSE: 278 MG/DL (ref 70–110)
POCT GLUCOSE: 334 MG/DL (ref 70–110)
POTASSIUM SERPL-SCNC: 3.7 MMOL/L
PROT SERPL-MCNC: 3.9 G/DL
PROT UR QL STRIP: NEGATIVE
PROTHROMBIN TIME: 10.9 SEC
RBC # BLD AUTO: 2.96 M/UL
SODIUM SERPL-SCNC: 131 MMOL/L
SP GR UR STRIP: 1.01 (ref 1–1.03)
TACROLIMUS BLD-MCNC: 7.3 NG/ML
URN SPEC COLLECT METH UR: ABNORMAL
UROBILINOGEN UR STRIP-ACNC: NEGATIVE EU/DL
WBC # BLD AUTO: 12.29 K/UL

## 2018-10-15 PROCEDURE — 25000003 PHARM REV CODE 250: Performed by: TRANSPLANT SURGERY

## 2018-10-15 PROCEDURE — 36415 COLL VENOUS BLD VENIPUNCTURE: CPT

## 2018-10-15 PROCEDURE — 27000646 HC AEROBIKA DEVICE

## 2018-10-15 PROCEDURE — 63600175 PHARM REV CODE 636 W HCPCS: Performed by: NURSE PRACTITIONER

## 2018-10-15 PROCEDURE — 63600175 PHARM REV CODE 636 W HCPCS: Performed by: STUDENT IN AN ORGANIZED HEALTH CARE EDUCATION/TRAINING PROGRAM

## 2018-10-15 PROCEDURE — P9047 ALBUMIN (HUMAN), 25%, 50ML: HCPCS | Mod: JG | Performed by: PHYSICIAN ASSISTANT

## 2018-10-15 PROCEDURE — 94664 DEMO&/EVAL PT USE INHALER: CPT

## 2018-10-15 PROCEDURE — 87186 SC STD MICRODIL/AGAR DIL: CPT

## 2018-10-15 PROCEDURE — 25000003 PHARM REV CODE 250: Performed by: NURSE PRACTITIONER

## 2018-10-15 PROCEDURE — 85730 THROMBOPLASTIN TIME PARTIAL: CPT

## 2018-10-15 PROCEDURE — 99232 SBSQ HOSP IP/OBS MODERATE 35: CPT | Mod: ,,, | Performed by: NURSE PRACTITIONER

## 2018-10-15 PROCEDURE — 63600175 PHARM REV CODE 636 W HCPCS: Performed by: PHYSICIAN ASSISTANT

## 2018-10-15 PROCEDURE — 87040 BLOOD CULTURE FOR BACTERIA: CPT

## 2018-10-15 PROCEDURE — 80053 COMPREHEN METABOLIC PANEL: CPT

## 2018-10-15 PROCEDURE — 25000003 PHARM REV CODE 250: Performed by: PHYSICIAN ASSISTANT

## 2018-10-15 PROCEDURE — 99233 SBSQ HOSP IP/OBS HIGH 50: CPT | Mod: 24,,, | Performed by: PHYSICIAN ASSISTANT

## 2018-10-15 PROCEDURE — 85610 PROTHROMBIN TIME: CPT

## 2018-10-15 PROCEDURE — 63600175 PHARM REV CODE 636 W HCPCS: Performed by: GENERAL PRACTICE

## 2018-10-15 PROCEDURE — 80069 RENAL FUNCTION PANEL: CPT

## 2018-10-15 PROCEDURE — 87086 URINE CULTURE/COLONY COUNT: CPT

## 2018-10-15 PROCEDURE — 87088 URINE BACTERIA CULTURE: CPT

## 2018-10-15 PROCEDURE — 80197 ASSAY OF TACROLIMUS: CPT

## 2018-10-15 PROCEDURE — 63600175 PHARM REV CODE 636 W HCPCS: Performed by: SURGERY

## 2018-10-15 PROCEDURE — 81003 URINALYSIS AUTO W/O SCOPE: CPT

## 2018-10-15 PROCEDURE — 85025 COMPLETE CBC W/AUTO DIFF WBC: CPT

## 2018-10-15 PROCEDURE — 20600001 HC STEP DOWN PRIVATE ROOM

## 2018-10-15 PROCEDURE — 87077 CULTURE AEROBIC IDENTIFY: CPT

## 2018-10-15 PROCEDURE — 83735 ASSAY OF MAGNESIUM: CPT

## 2018-10-15 PROCEDURE — 99900035 HC TECH TIME PER 15 MIN (STAT)

## 2018-10-15 PROCEDURE — 94761 N-INVAS EAR/PLS OXIMETRY MLT: CPT

## 2018-10-15 PROCEDURE — 25000003 PHARM REV CODE 250: Performed by: STUDENT IN AN ORGANIZED HEALTH CARE EDUCATION/TRAINING PROGRAM

## 2018-10-15 PROCEDURE — 99233 SBSQ HOSP IP/OBS HIGH 50: CPT | Mod: ,,, | Performed by: INTERNAL MEDICINE

## 2018-10-15 RX ORDER — INSULIN ASPART 100 [IU]/ML
10 INJECTION, SOLUTION INTRAVENOUS; SUBCUTANEOUS
Status: DISCONTINUED | OUTPATIENT
Start: 2018-10-15 | End: 2018-10-15

## 2018-10-15 RX ORDER — INSULIN ASPART 100 [IU]/ML
2 INJECTION, SOLUTION INTRAVENOUS; SUBCUTANEOUS
Status: DISCONTINUED | OUTPATIENT
Start: 2018-10-15 | End: 2018-10-18

## 2018-10-15 RX ORDER — LIDOCAINE HYDROCHLORIDE 10 MG/ML
10 INJECTION INFILTRATION; PERINEURAL ONCE
Status: COMPLETED | OUTPATIENT
Start: 2018-10-15 | End: 2018-10-15

## 2018-10-15 RX ORDER — FUROSEMIDE 10 MG/ML
60 INJECTION INTRAMUSCULAR; INTRAVENOUS 2 TIMES DAILY
Status: COMPLETED | OUTPATIENT
Start: 2018-10-15 | End: 2018-10-15

## 2018-10-15 RX ORDER — ALBUMIN HUMAN 250 G/1000ML
25 SOLUTION INTRAVENOUS ONCE
Status: COMPLETED | OUTPATIENT
Start: 2018-10-15 | End: 2018-10-15

## 2018-10-15 RX ORDER — INSULIN ASPART 100 [IU]/ML
9 INJECTION, SOLUTION INTRAVENOUS; SUBCUTANEOUS
Status: DISCONTINUED | OUTPATIENT
Start: 2018-10-15 | End: 2018-10-16

## 2018-10-15 RX ADMIN — HEPARIN SODIUM 5000 UNITS: 5000 INJECTION, SOLUTION INTRAVENOUS; SUBCUTANEOUS at 05:10

## 2018-10-15 RX ADMIN — INSULIN ASPART 9 UNITS: 100 INJECTION, SOLUTION INTRAVENOUS; SUBCUTANEOUS at 08:10

## 2018-10-15 RX ADMIN — FUROSEMIDE 60 MG: 10 INJECTION, SOLUTION INTRAMUSCULAR; INTRAVENOUS at 11:10

## 2018-10-15 RX ADMIN — INSULIN ASPART 1 UNITS: 100 INJECTION, SOLUTION INTRAVENOUS; SUBCUTANEOUS at 08:10

## 2018-10-15 RX ADMIN — URSODIOL 300 MG: 300 CAPSULE ORAL at 08:10

## 2018-10-15 RX ADMIN — DOCUSATE SODIUM 50 MG: 50 CAPSULE, LIQUID FILLED ORAL at 08:10

## 2018-10-15 RX ADMIN — ENTECAVIR 0.5 MG: 0.5 TABLET ORAL at 08:10

## 2018-10-15 RX ADMIN — MYCOPHENOLATE MOFETIL 1000 MG: 250 CAPSULE ORAL at 08:10

## 2018-10-15 RX ADMIN — SODIUM BICARBONATE 650 MG TABLET 1300 MG: at 08:10

## 2018-10-15 RX ADMIN — ALBUMIN HUMAN 25 G: 0.25 SOLUTION INTRAVENOUS at 11:10

## 2018-10-15 RX ADMIN — MIRTAZAPINE 7.5 MG: 7.5 TABLET ORAL at 08:10

## 2018-10-15 RX ADMIN — INSULIN ASPART 4 UNITS: 100 INJECTION, SOLUTION INTRAVENOUS; SUBCUTANEOUS at 06:10

## 2018-10-15 RX ADMIN — SODIUM BICARBONATE 650 MG TABLET 1300 MG: at 06:10

## 2018-10-15 RX ADMIN — MAGNESIUM SULFATE IN WATER 2 G: 40 INJECTION, SOLUTION INTRAVENOUS at 05:10

## 2018-10-15 RX ADMIN — MIDODRINE HYDROCHLORIDE 15 MG: 5 TABLET ORAL at 08:10

## 2018-10-15 RX ADMIN — INSULIN ASPART 9 UNITS: 100 INJECTION, SOLUTION INTRAVENOUS; SUBCUTANEOUS at 06:10

## 2018-10-15 RX ADMIN — HEPARIN SODIUM 5000 UNITS: 5000 INJECTION, SOLUTION INTRAVENOUS; SUBCUTANEOUS at 02:10

## 2018-10-15 RX ADMIN — DOCUSATE SODIUM 50 MG: 50 CAPSULE, LIQUID FILLED ORAL at 02:10

## 2018-10-15 RX ADMIN — FUROSEMIDE 60 MG: 10 INJECTION, SOLUTION INTRAMUSCULAR; INTRAVENOUS at 06:10

## 2018-10-15 RX ADMIN — SODIUM BICARBONATE 650 MG TABLET 1300 MG: at 02:10

## 2018-10-15 RX ADMIN — OXYCODONE HYDROCHLORIDE 5 MG: 5 TABLET ORAL at 02:10

## 2018-10-15 RX ADMIN — VALGANCICLOVIR 450 MG: 450 TABLET, FILM COATED ORAL at 08:10

## 2018-10-15 RX ADMIN — SULFAMETHOXAZOLE AND TRIMETHOPRIM 1 TABLET: 400; 80 TABLET ORAL at 08:10

## 2018-10-15 RX ADMIN — INSULIN ASPART 9 UNITS: 100 INJECTION, SOLUTION INTRAVENOUS; SUBCUTANEOUS at 02:10

## 2018-10-15 RX ADMIN — MIDODRINE HYDROCHLORIDE 15 MG: 5 TABLET ORAL at 02:10

## 2018-10-15 RX ADMIN — HEPARIN SODIUM 5000 UNITS: 5000 INJECTION, SOLUTION INTRAVENOUS; SUBCUTANEOUS at 08:10

## 2018-10-15 RX ADMIN — INSULIN ASPART 2 UNITS: 100 INJECTION, SOLUTION INTRAVENOUS; SUBCUTANEOUS at 02:10

## 2018-10-15 RX ADMIN — TACROLIMUS 0.5 MG: 0.5 CAPSULE ORAL at 08:10

## 2018-10-15 RX ADMIN — LIDOCAINE HYDROCHLORIDE 10 ML: 10 INJECTION, SOLUTION INFILTRATION; PERINEURAL at 03:10

## 2018-10-15 RX ADMIN — FLUCONAZOLE 200 MG: 200 TABLET ORAL at 08:10

## 2018-10-15 RX ADMIN — PANTOPRAZOLE SODIUM 40 MG: 40 TABLET, DELAYED RELEASE ORAL at 08:10

## 2018-10-15 RX ADMIN — BISACODYL 10 MG: 5 TABLET, COATED ORAL at 08:10

## 2018-10-15 RX ADMIN — PREDNISONE 20 MG: 10 TABLET ORAL at 08:10

## 2018-10-15 NOTE — PLAN OF CARE
Problem: Patient Care Overview  Goal: Plan of Care Review  Outcome: Ongoing (interventions implemented as appropriate)  Pt has call bell in reach, non slip socks on, and bedrails up x2. Pt encouraged to wash hands. Pt receiving CRRT tonight for 6 hrs. Pt has labs tonight and am. Pt has family at bedside. Pt has accuchecks ac/hs. Pt working with PT/Ot during day.

## 2018-10-15 NOTE — PLAN OF CARE
Problem: Patient Care Overview  Goal: Plan of Care Review  Outcome: Ongoing (interventions implemented as appropriate)  Patient aaox4. Bed kept locked, lowest position with 2x side rails up while in bed. nonslip footwear when out of bed. Ambulates with 1x assistance, ambulation encouraged. Up in chair for most of shift. Call bell and personal items within reach. Family at bedside, attentive to patient's needs.  used for language. Diabetic diet with 1000ml fluid restriction. accuchecks ac/hs, insulin given as ordered, eating encouraged. +3 bilateral lower extremity edema. Given 60mg lasix BID. Albumin 1x. Right IJ cdi. CRRT last night, not ordered for tonight. Went for liver ultrasound this morning.  Chevron dedrick with staples, painted with betadine, right side and old VALORIE site leaking, ABD pad in place. Abdomen distended.  Urine and blood cultures sent. Contact precautions maintained.

## 2018-10-15 NOTE — ASSESSMENT & PLAN NOTE
- Na 119 on admit.  -Na was improving post transplant w HD.  - Pt POD #9 w EVA, Na worse today at 125.  Changed FR to 1L.  - Na stable post CRRT  - Will cont to monitor

## 2018-10-15 NOTE — TELEPHONE ENCOUNTER
10/11/18: Liver Pathology Conference:    Liver Biopsy: a lot of cholestasis   Plan : repeat  Ultrasound      Liver Explant: no malignancy/ Hep B & D

## 2018-10-15 NOTE — PT/OT/SLP PROGRESS
Occupational Therapy      Patient Name:  Scarlett Reddy   MRN:  [id number]    Patient not seen today secondary to Patient fatigue. Writing therapist attempted pt in PM, but too fatigued to participate. Pt agreeable to OT returning tomorrow. Will follow-up as scheduled.    Martín Martinez, OT  10/15/2018

## 2018-10-15 NOTE — PROGRESS NOTES
[facility]  Liver Transplant  Progress Note    Patient Name: Scarlett Reddy  MRN: [id number]  Admission Date: 10/1/2018  Hospital Length of Stay: 13 days  Code Status: Full Code  Primary Care Provider: Primary Doctor No  Post-Operative Day: 10    ORGAN:   LIVER  Disease Etiology: Cirrhosis: Type B and D  Donor Type:    - Brain Death  CDC High Risk:   No  Donor CMV Status:   Donor CMV Status: Positive  Donor HBcAB:   Negative  Donor HCV Status:   Negative  Donor HBV SETH: Negative  Donor HCV SETH: Negative  Whole or Partial: Whole Liver  Biliary Anastomosis: End to End  Arterial Anatomy: Replaced Left Hepatic and Right Hepatic  Subjective:     History of Present Illness:  Ms. Reddy is a 67 y/o female with PMH of ESLD secondary Hep B and D.  Listed for liver transplant with MELD 23.  Paracentesis 10/1 with 5L removed, no fluid sent for analysis.  Morning labs significant for hyponatremia, Na 119.  Pt admitted to LTS for sodium monitoring.        Hospital Course:  Hospital Course: 67 y/o F h/o HBV/delta cirrhosis s/p DBDLT 10/5/18 (CMV D+/R+, steroid induction, MMF/tacro/pred maintenance) with take back on 10/6 2/2 hyperbilirubinema and bilious VALORIE drainage, no leak identified. Post-op course c/b hypercapneic and hypoxic respiratory failure and was reintubated though quickly extubated now doing well. Liver bx (10/6) sig for Zone 3 hemorrhage and collapse, in addition to cholestasis. Transferred from ICU on POD #4. ID consulted to comment on positive diphtheroid culture from ascitic fluid (likely skin colonization) as well as ESBL Klebsiella pneumoniae in urine culture (10/4).  Pt asymptomatic and cell count from ascitic fluid unremarkable. Per ID recs, no need to treat diphtheroids or asymptomatic ESBL Kleb pneumo bacteriuria though pt has had 6 days of therapy which would cover these organisms. Mild peritransplant ascites- repeat US 10/6 with increased arterial resistive indices. Repeat US 10/9  w/ continued elevation in RIs and fluid collections. LFTs trending down nicely. Acute gradual worsening of renal function s/p OLT.Creatinine on arrival 0.8 and has been up trending. Nephrology consulted for post-op EVA. Remains on renal/low K diet.    Interval history: CRRT last night (, Na 125, K 5.2) Tolerated well.  C/o feeling weak, decreased appetite, and lack of sleep this morning, likely 2/2 to dialysis. Pt having rt sided abd pains, repeat liver US this morning w/ no significant findings. WBC count increased (12.3). Repeating blood/urine cx today. Pt w/ LE edema-- Lasix 60 BID and Alb 25% today. LFTs and T bili cont to trend down. Pt leaking from previous VALORIE site- will correct w/ additional stitch.  Denies N/V. +BM. VSS. Encouraged pt to increase nutritional intake. Will reassess tomorrow, w/ possible placement of NGT if no improvement.     Scheduled Meds:   bisacodyl  10 mg Oral QHS    docusate sodium  50 mg Oral TID    entecavir  0.5 mg Oral Q72H    ergocalciferol  50,000 Units Oral Q7 Days    fluconazole  200 mg Oral Daily    furosemide  60 mg Intravenous BID    heparin (porcine)  5,000 Units Subcutaneous Q8H    insulin aspart U-100  9 Units Subcutaneous TIDWM    insulin detemir U-100  14 Units Subcutaneous QHS    midodrine  15 mg Oral TID    mirtazapine  7.5 mg Oral QHS    mycophenolate  1,000 mg Oral BID    pantoprazole  40 mg Oral Daily    predniSONE  20 mg Oral Daily    sodium bicarbonate  1,300 mg Oral QID    sulfamethoxazole-trimethoprim 400-80mg  1 tablet Oral Every Mon, Wed, Fri    tacrolimus  0.5 mg Oral Daily    ursodiol  300 mg Oral BID    valGANciclovir  450 mg Oral Every Mon, Wed, Fri     Continuous Infusions:   sodium chloride 0.9% 200 mL/hr at 10/14/18 2210     PRN Meds:sodium chloride, bisacodyl, calcium carbonate, dextrose 50%, dextrose 50%, glucagon (human recombinant), glucose, glucose, insulin aspart U-100, magnesium sulfate IVPB, ondansetron, oxyCODONE,  oxyCODONE, polyethylene glycol, promethazine (PHENERGAN) IVPB, sodium chloride 0.9%, sodium phosphate IVPB, sodium phosphate IVPB, sodium phosphate IVPB    Review of Systems   Constitutional: Positive for activity change (decreased), appetite change and fatigue. Negative for fever.   Respiratory: Negative for cough and shortness of breath.    Cardiovascular: Positive for leg swelling. Negative for chest pain.   Gastrointestinal: Positive for abdominal distention and abdominal pain. Negative for constipation, diarrhea, nausea and vomiting.   Genitourinary: Negative for decreased urine volume, difficulty urinating and dysuria.   Musculoskeletal: Positive for back pain (lower). Negative for arthralgias.   Allergic/Immunologic: Positive for immunocompromised state.     Objective:     Vital Signs (Most Recent):  Temp: 97.8 °F (36.6 °C) (10/15/18 1124)  Pulse: 85 (10/15/18 1200)  Resp: 11 (10/15/18 1200)  BP: 121/69 (10/15/18 1200)  SpO2: 100 % (10/15/18 1200) Vital Signs (24h Range):  Temp:  [97.6 °F (36.4 °C)-97.8 °F (36.6 °C)] 97.8 °F (36.6 °C)  Pulse:  [83-96] 85  Resp:  [10-25] 11  SpO2:  [96 %-100 %] 100 %  BP: ()/(53-82) 121/69     Weight: 66.3 kg (146 lb 2.6 oz)  Body mass index is 25.89 kg/m².    Intake/Output - Last 3 Shifts       10/13 0700 - 10/14 0659 10/14 0700 - 10/15 0659 10/15 0700 - 10/16 0659    P.O. 1180 620 180    I.V. (mL/kg) 8 (0.1) 1343.3 (20.3)     Blood  219     Total Intake(mL/kg) 1188 (17.9) 2182.3 (32.9) 180 (2.7)    Urine (mL/kg/hr) 850 (0.5) 695 (0.4)     Other  1224     Stool 0 0     Total Output 850 1919     Net +338 +263.3 +180           Urine Occurrence 4 x      Stool Occurrence 2 x 1 x           Physical Exam   Constitutional: She is oriented to person, place, and time.   Temporal and distal extremity muscle wasting   Eyes: EOM are normal. Pupils are equal, round, and reactive to light. Scleral icterus is present.   Neck: Normal range of motion.   Cardiovascular: Normal rate,  regular rhythm and normal heart sounds.   No murmur heard.  Pulmonary/Chest: Effort normal and breath sounds normal. No respiratory distress.   Abdominal: Soft. Bowel sounds are normal. She exhibits distension. There is no tenderness.   Chevron incision w/ staples, CDI  VALORIE site w/ fluid leak   Musculoskeletal: Normal range of motion. She exhibits edema (3+ BL lower extremities).   Neurological: She is alert and oriented to person, place, and time.   Skin: Skin is warm and dry.   Nursing note and vitals reviewed.      Laboratory:  Immunosuppressants         Stop Route Frequency     tacrolimus capsule 0.5 mg      -- Oral Daily     mycophenolate capsule 1,000 mg      -- Oral 2 times daily        CBC:   Recent Labs   Lab  10/15/18   0402   WBC  12.29   RBC  2.96*   HGB  8.7*   HCT  25.0*   PLT  91*   MCV  85   MCH  29.4   MCHC  34.8     CMP:   Recent Labs   Lab  10/15/18   0402   GLU  108  108  108   CALCIUM  7.7*  7.7*  7.7*   ALBUMIN  2.3*  2.3*  2.3*   PROT  3.9*   NA  131*  131*  131*   K  3.7  3.7  3.7   CO2  26  26  26   CL  95  95  95   BUN  70*  70*  70*   CREATININE  1.6*  1.6*  1.6*   ALKPHOS  178*   ALT  68*   AST  39   BILITOT  6.9*     Labs within the past 24 hours have been reviewed.    Diagnostic Results:  I have personally reviewed all pertinent imaging studies.    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   -POD 1 US: increased arterial resistive indices, suggestive of edema vs cute rejection vs congestion  -Repeat US 10/9 with continued elevation of RIs  -LFTs trending down  -T bili trending down  -repeat liver US (10/11): elevated RIs  - LFTs yet to normalize but are trending down.  - pt c/o right side pain, likely from dependent edema   -US 10/14 no signs of bleeding            Post LT - Acute renal failure     - Creatinine on arrival 0.8 and has been up trending   BUN/cr (10/11/18): 91/1.9   Tacrolimus 26.9 (10/08)  Nephrology consulted-- no RRT recommended at this time  Renal US (10/11): no hydronephrosis  Echo 10/12: diastolic dysfunction, likely 2/2 to ARF  CVP 7.  - pt on renal diet w 1500 ml FR.          Hypervolemia associated with renal insufficiency    - Diuresed w/ Lasix 100mg x 1 (10/12).  - started Lasix 80 mg bid per Nephrology 10/12.  - CRRT 10/14.  .    -Restarted Lasix 60 BID  + Alb 25% x 1 on 10/15          EVA (acute kidney injury)              Metabolic acidosis    - Started Na bicarb 1300 QID.  -CRRT 10/14          At risk for opportunistic infections    - Valcyte for CMV prophylaxis --> will start at discharge or  POD #10 in hospital  - Bactrim for PCP prophylaxis (MWF)  - Fluconazole for fungal prophylaxis           Long-term use of immunosuppressant medication    -  Cellcept/Prograf/pred  -  Held prograf 10/8 for elevated levels  -  Resumed Prograf 10/10/19 at dose of 0.5 mg.  Will adjust as needed.   -  Will monitor for signs of toxic side effects, check daily tacrolimus troughs, and change meds accordingly.          Prophylactic immunotherapy    - See long term use immunosuppresion          Ascites    - paracentesis 10/1, 5L removed, no fluid sent for analysis.  - diuresing with albumin and lasix 10/9.  - Per Nephrology, on 10/13- Lasix 80 mg bid and monitor.  No need for RRT.  -  10/14, pt w worsening edema.  , .  Nephr plan for CRRT this evening.           Weakness    - see physical decond.          Physical deconditioning    - PT/OT consulted.  - Recommend home health PT and rolling walker at discharge           Hyponatremia    - Na 119 on admit.  -Na was improving post transplant w HD.  - Pt POD #9 w EVA, Na worse today at 125.  Changed FR to 1L.  - Na stable post CRRT  - Will cont to monitor                  Hypotension    - continue Midodrine 15 mg TID.   Monitor.           Anemia requiring transfusions    - pt w decreased H/H.  Transfuse 1 u PRBC 10/14   - pt c/o right side pain but no other signs of overt blood loss.   -US w/ no signs of bleeding  -cont to monitor           Severe malnutrition    - dietary consulted.   - temporal and distal extremity muscle wasting and edema  - encourage supplements.  - will re consult dietician again to review renal/low K+ diet        Type 2 diabetes mellitus with diabetic polyneuropathy, with long-term current use of insulin    - continue home regimen.  - endocrine consulted.  -Pt off insulin drip at this time.  Apprec endo recs.           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  -Tx w/ Entecavir- dose changed to q72 hours given renal function.                VTE Risk Mitigation (From admission, onward)        Ordered     Place sequential compression device  Until discontinued      10/05/18 0709     heparin (porcine) injection 5,000 Units  Every 8 hours      10/01/18 1617          The patients clinical status was discussed at multidisplinary rounds, involving transplant surgery, transplant medicine, pharmacy, nursing, nutrition, and social work    Discharge Planning:  No Patient Care Coordination Note on file.      Jihan Soares PA-C  Liver Transplant  [facility]

## 2018-10-15 NOTE — ASSESSMENT & PLAN NOTE
- pt w decreased H/H.  Transfuse 1 u PRBC 10/14   - pt c/o right side pain but no other signs of overt blood loss.   -US w/ no signs of bleeding  -cont to monitor

## 2018-10-15 NOTE — PROGRESS NOTES
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  Paracentesis 10/1 with 5L removed, no fluid sent for analysis. 10/02 significant for hyponatremia, Na 119 and she was admitted to LTS for sodium monitoring. On 10/05/18 she is s/p DBDLT (CMV D+/R+, steroid induction, MMF/tacro/pred maintenance) with take back on 10/6 secondary to hyperbilirubinema, no leak identified. Post-op course complicated by hypercapneic and hypoxic respiratory failure and was intubated 10/06 and extubated 10/07. Liver bx (10/6) sig for Zone 3 hemorrhage and collapse, in addition to cholestasis. ID consulted to comment on positive diphtheroid culture from ascitic fluid (likely skin colonization) as well as ESBL Klebsiella pneumoniae in urine culture (10/4).  Pt asymptomatic and cell count from ascitic fluid unremarkable. Mild peritransplant ascites- repeat US 10/6 with increased arterial resistive indices. Repeat US 10/9 w/ continued elevation in RIs and fluid collections. LFTs trending down nicely.     Patient is currently on entecavir 0.5 mg, Hepagam, mycophenalate 1000 mg BID, prednisone 20 mg and prophylaxis with Bactrim, fluconazole and Valgancyclovir.    Nephrology consulted for acute kidney failure is setting of Orthotopic Liver Transplant. Cr 0.8 on 10/05, started to trend up and 1.5 (10/09), 1.8 (10/10), and 1.9 (10/11). UOP 10/10/18: 520 ml. UA (10/09): 1+ occult blood and 1+ protein with RBC and amorphous casts.      Interval History: Over the weekend, patient continued to be diuresed with lasix  mg (10/13) and 60 mg IV BID (10/14). On 10/14, had hyponatremia of 125 and  and  underwent CRRT for 6 hours completed at 5 am (10/15) to remove toxins and correct electrolyte derangements.    In the am, continues to complaints of bilateral peripheral edema up to hip region, appears tense on physical exam.    Review of patient's allergies indicates:  No Known Allergies  Current Facility-Administered Medications   Medication Frequency    0.9%  NaCl infusion (CRRT USE ONLY) Continuous    0.9%  NaCl infusion (for blood administration) Q24H PRN    bisacodyl EC tablet 10 mg QHS    bisacodyl suppository 10 mg Daily PRN    calcium carbonate 200 mg calcium (500 mg) chewable tablet 500 mg TID PRN    dextrose 50% injection 12.5 g PRN    dextrose 50% injection 25 g PRN    docusate sodium capsule 50 mg TID    entecavir tablet 0.5 mg Q72H    ergocalciferol capsule 50,000 Units Q7 Days    fluconazole tablet 200 mg Daily    furosemide injection 60 mg BID    glucagon (human recombinant) injection 1 mg PRN    glucose chewable tablet 16 g PRN    glucose chewable tablet 24 g PRN    heparin (porcine) injection 5,000 Units Q8H    insulin aspart U-100 pen 0-5 Units QID (AC + HS) PRN    insulin aspart U-100 pen 10 Units TIDWM    insulin detemir U-100 pen 12 Units QHS    lidocaine HCL 10 mg/ml (1%) injection 10 mL Once    midodrine tablet 15 mg TID    mirtazapine tablet 7.5 mg QHS    mycophenolate capsule 1,000 mg BID    ondansetron disintegrating tablet 8 mg Q8H PRN    oxyCODONE immediate release tablet 5 mg Q4H PRN    oxyCODONE immediate release tablet Tab 10 mg Q4H PRN    pantoprazole EC tablet 40 mg Daily    polyethylene glycol packet 17 g Daily PRN    predniSONE tablet 20 mg Daily    promethazine (PHENERGAN) 6.25 mg in dextrose 5 % 50 mL IVPB Q6H PRN    sodium bicarbonate tablet 1,300 mg QID    sodium chloride 0.9% flush 3 mL PRN    sulfamethoxazole-trimethoprim 400-80mg per tablet 1 tablet Every Mon, Wed, Fri    tacrolimus capsule 0.5 mg Daily    ursodiol capsule 300 mg BID     valGANciclovir tablet 450 mg Every Mon, Wed, Fri     Review of Symptoms:  Constitutional: Negative for chills, fatigue and fever.   Respiratory: Negative for cough and shortness of breath.    Cardiovascular: Positive for leg swelling. Negative for chest pain.   Gastrointestinal: Positive for abdominal distention. Negative for abdominal pain, constipation, diarrhea and vomiting.   Genitourinary: Negative for decreased urine volume, difficulty urinating and dysuria.   Musculoskeletal: Negative for arthralgias and back pain.   Allergic/Immunologic: Positive for immunocompromised state.   Neurological: Negative for tremors and headaches.    Objective:     Vital Signs (Most Recent):  Temp: 97.3 °F (36.3 °C) (10/15/18 1604)  Pulse: 92 (10/15/18 1500)  Resp: 11 (10/15/18 1200)  BP: 121/69 (10/15/18 1200)  SpO2: 100 % (10/15/18 1200)  O2 Device (Oxygen Therapy): room air (10/15/18 1200) Vital Signs (24h Range):  Temp:  [97.3 °F (36.3 °C)-97.8 °F (36.6 °C)] 97.3 °F (36.3 °C)  Pulse:  [83-96] 92  Resp:  [10-25] 11  SpO2:  [96 %-100 %] 100 %  BP: ()/(53-82) 121/69     Weight: 66.3 kg (146 lb 2.6 oz) (10/14/18 0449)  Body mass index is 25.89 kg/m².  Body surface area is 1.72 meters squared.    I/O last 3 completed shifts:  In: 2362.3 [P.O.:800; I.V.:1343.3; Blood:219]  Out: 2519 [Urine:1295; Other:1224]    Physical Exam  Constitutional: She is oriented to person, place, and time. She appears well-developed.   Temporal and distal extremity muscle wasting; yellowing of skin  Eyes: EOM are normal. Pupils are equal, round, and reactive to light. Scleral icterus is present.   Neck: Normal range of motion.   Cardiovascular: Normal rate and regular rhythm.   No murmur heard.  Pulmonary/Chest: Effort normal and breath sounds normal. No respiratory distress. She has no wheezes.   Abdominal: Soft. Bowel sounds are normal. She exhibits distension. There is no tenderness.   Chevron incision w/ staples, CDI  Musculoskeletal: Normal  range of motion. She exhibits edema (BL lower extremities).   Neurological: She is alert and oriented to person, place, and time.   Skin: Skin is warm and dry.   Nursing note and vitals reviewed.     Significant Labs:  CBC:   Recent Labs   Lab  10/15/18   0402   WBC  12.29   RBC  2.96*   HGB  8.7*   HCT  25.0*   PLT  91*   MCV  85   MCH  29.4   MCHC  34.8     CMP:   Recent Labs   Lab  10/15/18   0402   GLU  108  108  108   CALCIUM  7.7*  7.7*  7.7*   ALBUMIN  2.3*  2.3*  2.3*   PROT  3.9*   NA  131*  131*  131*   K  3.7  3.7  3.7   CO2  26  26  26   CL  95  95  95   BUN  70*  70*  70*   CREATININE  1.6*  1.6*  1.6*   ALKPHOS  178*   ALT  68*   AST  39   BILITOT  6.9*     Recent Labs   Lab  10/09/18   1130   COLORU  Stacy   SPECGRAV  1.020   PHUR  5.0   PROTEINUA  1+*   BACTERIA  None   NITRITE  Negative   LEUKOCYTESUR  Negative   UROBILINOGEN  Negative   HYALINECASTS  0     All labs within the past 24 hours have been reviewed.     Significant Imaging:  US Liver Transplant (10/15):   Impression       Slight interval improvement in the left hepatic artery resistive indices with persistent stable elevation of the remaining hepatic resistive indices.  Findings are favored to be secondary to liver edema.    Reducing peritransplant fluid collections.    Mild volume ascites.    Partially visualized right pleural effusion.     10/11/2018 US Retroperitoneal:          FINDINGS:  Right kidney: Normal in size measuring 9.8 cm. Increased cortical echogenicity.  No loss of corticomedullary distinction. Resistive index measures 0.78.  No mass. No renal stone. No hydronephrosis.    Left kidney: Normal in size measuring 10.0 cm. Increased cortical echogenicity.  No loss of corticomedullary distinction. Resistive index measures 0.77.  No mass. No renal stone. No hydronephrosis.    The bladder is partially distended at the time of scanning and has an unremarkable appearance.    Mild volume ascites.       Impression          Upper normal to mildly elevated renal resistive indices suggestive of medical renal disease.    Ascites.       Assessment/Plan:      Post LT - Acute renal failure     Post-LT EVA is multifactorial in origin and has been related to the severity of liver disease, toxicity of immunosuppressive therapy and hepatic ischaemia reperfusion injury might be a driving force in the aetiology of post-LT EVA.        - acute gradual worsening of renal function s/p OLT  - 10/06 was taken to the OR for possibility of billiary leak and post op complication due to hypercapnic hypoxic respiratory failure, no blood pressure drop intraoperatively per anesthesiology notes. Patient has not been needing vasopressors to maintain BP  - Creatinine on arrival 0.8 and has been up trending   - BUN/cr (10/11/18): 91/1.9; (10/12/18): 100/2.0  - Tacrolimus 26.9 (10/08); 6.8 (10/12)  - Protein Creatine ratio: 0.82  - UA consistent with hematuria, proteinuria, RBC and amorphous casts   - Fena 0.5%; consistent with hypovolemia   - Renal US negative for obstruction, stricture or, hydronephrosis  - (10/15/18):  Over the weekend, patient continued to be diuresed with lasix  mg (10/13) and 60 mg IV BID (10/14). On 10/14, had hyponatremia of 125 and  and underwent CRRT for 6 hours completed at 5 am (10/15) to remove toxins and correct electrolyte derangements. Bun/Cr (10/15): 17/1.2. UOP 1.9 L    Assessment: Acute Renal failure in setting of hepatic ischemia reperfusion injury and poor perfusion status vs acute tubular necrosis from immunosuppressive agents      Plan:  -  Diureses w/ Lasix 80 mg BID daily.   - Continue fluid restriction intake max 1L/24 hours  - avoid nephrotoxic drugs, ACEI/ARB, NSAIDS  - adjust drugs to GFR: bactrim three times a week  - Repeat tacrolimus levels daily and adjust renally  - Acute RRT not indicated at this point  - will reassess in am with repeat RFT's and electrolytes; and response to diuretics  - Strict  I and O's; monitor UOP in response to lasix                   Thank you for your consult. I will follow-up with patient. Please contact us if you have any additional questions.     Darrel Adhikari MD   Internal Medicine PGY II  Pager: [phone]  Nephrology  [facility]

## 2018-10-15 NOTE — SUBJECTIVE & OBJECTIVE
Interval History: Over the weekend, patient continued to be diuresed with lasix  mg (10/13) and 60 mg IV BID (10/14). On 10/14, had hyponatremia of 125 and  and underwent CRRT for 6 hours completed at 5 am (10/15) to remove toxins and correct electrolyte derangements.    In the am, continues to complaints of bilateral peripheral edema up to hip region, appears tense on physical exam.    Review of patient's allergies indicates:  No Known Allergies  Current Facility-Administered Medications   Medication Frequency    0.9%  NaCl infusion (CRRT USE ONLY) Continuous    0.9%  NaCl infusion (for blood administration) Q24H PRN    bisacodyl EC tablet 10 mg QHS    bisacodyl suppository 10 mg Daily PRN    calcium carbonate 200 mg calcium (500 mg) chewable tablet 500 mg TID PRN    dextrose 50% injection 12.5 g PRN    dextrose 50% injection 25 g PRN    docusate sodium capsule 50 mg TID    entecavir tablet 0.5 mg Q72H    ergocalciferol capsule 50,000 Units Q7 Days    fluconazole tablet 200 mg Daily    furosemide injection 60 mg BID    glucagon (human recombinant) injection 1 mg PRN    glucose chewable tablet 16 g PRN    glucose chewable tablet 24 g PRN    heparin (porcine) injection 5,000 Units Q8H    insulin aspart U-100 pen 0-5 Units QID (AC + HS) PRN    insulin aspart U-100 pen 10 Units TIDWM    insulin detemir U-100 pen 12 Units QHS    lidocaine HCL 10 mg/ml (1%) injection 10 mL Once    midodrine tablet 15 mg TID    mirtazapine tablet 7.5 mg QHS    mycophenolate capsule 1,000 mg BID    ondansetron disintegrating tablet 8 mg Q8H PRN    oxyCODONE immediate release tablet 5 mg Q4H PRN    oxyCODONE immediate release tablet Tab 10 mg Q4H PRN    pantoprazole EC tablet 40 mg Daily    polyethylene glycol packet 17 g Daily PRN    predniSONE tablet 20 mg Daily    promethazine (PHENERGAN) 6.25 mg in dextrose 5 % 50 mL IVPB Q6H PRN    sodium bicarbonate tablet 1,300 mg QID    sodium chloride  0.9% flush 3 mL PRN    sulfamethoxazole-trimethoprim 400-80mg per tablet 1 tablet Every Mon, Wed, Fri    tacrolimus capsule 0.5 mg Daily    ursodiol capsule 300 mg BID    valGANciclovir tablet 450 mg Every Mon, Wed, Fri     Review of Symptoms:  Constitutional: Negative for chills, fatigue and fever.   Respiratory: Negative for cough and shortness of breath.    Cardiovascular: Positive for leg swelling. Negative for chest pain.   Gastrointestinal: Positive for abdominal distention. Negative for abdominal pain, constipation, diarrhea and vomiting.   Genitourinary: Negative for decreased urine volume, difficulty urinating and dysuria.   Musculoskeletal: Negative for arthralgias and back pain.   Allergic/Immunologic: Positive for immunocompromised state.   Neurological: Negative for tremors and headaches.    Objective:     Vital Signs (Most Recent):  Temp: 97.3 °F (36.3 °C) (10/15/18 1604)  Pulse: 92 (10/15/18 1500)  Resp: 11 (10/15/18 1200)  BP: 121/69 (10/15/18 1200)  SpO2: 100 % (10/15/18 1200)  O2 Device (Oxygen Therapy): room air (10/15/18 1200) Vital Signs (24h Range):  Temp:  [97.3 °F (36.3 °C)-97.8 °F (36.6 °C)] 97.3 °F (36.3 °C)  Pulse:  [83-96] 92  Resp:  [10-25] 11  SpO2:  [96 %-100 %] 100 %  BP: ()/(53-82) 121/69     Weight: 66.3 kg (146 lb 2.6 oz) (10/14/18 0449)  Body mass index is 25.89 kg/m².  Body surface area is 1.72 meters squared.    I/O last 3 completed shifts:  In: 2362.3 [P.O.:800; I.V.:1343.3; Blood:219]  Out: 2519 [Urine:1295; Other:1224]    Physical Exam  Constitutional: She is oriented to person, place, and time. She appears well-developed.   Temporal and distal extremity muscle wasting; yellowing of skin  Eyes: EOM are normal. Pupils are equal, round, and reactive to light. Scleral icterus is present.   Neck: Normal range of motion.   Cardiovascular: Normal rate and regular rhythm.   No murmur heard.  Pulmonary/Chest: Effort normal and breath sounds normal. No respiratory distress.  She has no wheezes.   Abdominal: Soft. Bowel sounds are normal. She exhibits distension. There is no tenderness.   Chevron incision w/ staples, CDI  Musculoskeletal: Normal range of motion. She exhibits edema (BL lower extremities).   Neurological: She is alert and oriented to person, place, and time.   Skin: Skin is warm and dry.   Nursing note and vitals reviewed.     Significant Labs:  CBC:   Recent Labs   Lab  10/15/18   0402   WBC  12.29   RBC  2.96*   HGB  8.7*   HCT  25.0*   PLT  91*   MCV  85   MCH  29.4   MCHC  34.8     CMP:   Recent Labs   Lab  10/15/18   0402   GLU  108  108  108   CALCIUM  7.7*  7.7*  7.7*   ALBUMIN  2.3*  2.3*  2.3*   PROT  3.9*   NA  131*  131*  131*   K  3.7  3.7  3.7   CO2  26  26  26   CL  95  95  95   BUN  70*  70*  70*   CREATININE  1.6*  1.6*  1.6*   ALKPHOS  178*   ALT  68*   AST  39   BILITOT  6.9*     Recent Labs   Lab  10/09/18   1130   COLORU  Stacy   SPECGRAV  1.020   PHUR  5.0   PROTEINUA  1+*   BACTERIA  None   NITRITE  Negative   LEUKOCYTESUR  Negative   UROBILINOGEN  Negative   HYALINECASTS  0     All labs within the past 24 hours have been reviewed.     Significant Imaging:  US Liver Transplant (10/15):   Impression       Slight interval improvement in the left hepatic artery resistive indices with persistent stable elevation of the remaining hepatic resistive indices.  Findings are favored to be secondary to liver edema.    Reducing peritransplant fluid collections.    Mild volume ascites.    Partially visualized right pleural effusion.

## 2018-10-15 NOTE — SUBJECTIVE & OBJECTIVE
Scheduled Meds:   bisacodyl  10 mg Oral QHS    docusate sodium  50 mg Oral TID    entecavir  0.5 mg Oral Q72H    ergocalciferol  50,000 Units Oral Q7 Days    fluconazole  200 mg Oral Daily    furosemide  60 mg Intravenous BID    heparin (porcine)  5,000 Units Subcutaneous Q8H    insulin aspart U-100  9 Units Subcutaneous TIDWM    insulin detemir U-100  14 Units Subcutaneous QHS    midodrine  15 mg Oral TID    mirtazapine  7.5 mg Oral QHS    mycophenolate  1,000 mg Oral BID    pantoprazole  40 mg Oral Daily    predniSONE  20 mg Oral Daily    sodium bicarbonate  1,300 mg Oral QID    sulfamethoxazole-trimethoprim 400-80mg  1 tablet Oral Every Mon, Wed, Fri    tacrolimus  0.5 mg Oral Daily    ursodiol  300 mg Oral BID    valGANciclovir  450 mg Oral Every Mon, Wed, Fri     Continuous Infusions:   sodium chloride 0.9% 200 mL/hr at 10/14/18 2210     PRN Meds:sodium chloride, bisacodyl, calcium carbonate, dextrose 50%, dextrose 50%, glucagon (human recombinant), glucose, glucose, insulin aspart U-100, magnesium sulfate IVPB, ondansetron, oxyCODONE, oxyCODONE, polyethylene glycol, promethazine (PHENERGAN) IVPB, sodium chloride 0.9%, sodium phosphate IVPB, sodium phosphate IVPB, sodium phosphate IVPB    Review of Systems   Constitutional: Positive for activity change (decreased), appetite change and fatigue. Negative for fever.   Respiratory: Negative for cough and shortness of breath.    Cardiovascular: Positive for leg swelling. Negative for chest pain.   Gastrointestinal: Positive for abdominal distention and abdominal pain. Negative for constipation, diarrhea, nausea and vomiting.   Genitourinary: Negative for decreased urine volume, difficulty urinating and dysuria.   Musculoskeletal: Positive for back pain (lower). Negative for arthralgias.   Allergic/Immunologic: Positive for immunocompromised state.     Objective:     Vital Signs (Most Recent):  Temp: 97.8 °F (36.6 °C) (10/15/18 1124)  Pulse: 85  (10/15/18 1200)  Resp: 11 (10/15/18 1200)  BP: 121/69 (10/15/18 1200)  SpO2: 100 % (10/15/18 1200) Vital Signs (24h Range):  Temp:  [97.6 °F (36.4 °C)-97.8 °F (36.6 °C)] 97.8 °F (36.6 °C)  Pulse:  [83-96] 85  Resp:  [10-25] 11  SpO2:  [96 %-100 %] 100 %  BP: ()/(53-82) 121/69     Weight: 66.3 kg (146 lb 2.6 oz)  Body mass index is 25.89 kg/m².    Intake/Output - Last 3 Shifts       10/13 0700 - 10/14 0659 10/14 0700 - 10/15 0659 10/15 0700 - 10/16 0659    P.O. 1180 620 180    I.V. (mL/kg) 8 (0.1) 1343.3 (20.3)     Blood  219     Total Intake(mL/kg) 1188 (17.9) 2182.3 (32.9) 180 (2.7)    Urine (mL/kg/hr) 850 (0.5) 695 (0.4)     Other  1224     Stool 0 0     Total Output 850 1919     Net +338 +263.3 +180           Urine Occurrence 4 x      Stool Occurrence 2 x 1 x           Physical Exam   Constitutional: She is oriented to person, place, and time.   Temporal and distal extremity muscle wasting   Eyes: EOM are normal. Pupils are equal, round, and reactive to light. Scleral icterus is present.   Neck: Normal range of motion.   Cardiovascular: Normal rate, regular rhythm and normal heart sounds.   No murmur heard.  Pulmonary/Chest: Effort normal and breath sounds normal. No respiratory distress.   Abdominal: Soft. Bowel sounds are normal. She exhibits distension. There is no tenderness.   Chevron incision w/ staples, CDI  VALORIE site w/ fluid leak   Musculoskeletal: Normal range of motion. She exhibits edema (3+ BL lower extremities).   Neurological: She is alert and oriented to person, place, and time.   Skin: Skin is warm and dry.   Nursing note and vitals reviewed.      Laboratory:  Immunosuppressants         Stop Route Frequency     tacrolimus capsule 0.5 mg      -- Oral Daily     mycophenolate capsule 1,000 mg      -- Oral 2 times daily        CBC:   Recent Labs   Lab  10/15/18   0402   WBC  12.29   RBC  2.96*   HGB  8.7*   HCT  25.0*   PLT  91*   MCV  85   MCH  29.4   MCHC  34.8     CMP:   Recent Labs   Lab   10/15/18   0402   GLU  108  108  108   CALCIUM  7.7*  7.7*  7.7*   ALBUMIN  2.3*  2.3*  2.3*   PROT  3.9*   NA  131*  131*  131*   K  3.7  3.7  3.7   CO2  26  26  26   CL  95  95  95   BUN  70*  70*  70*   CREATININE  1.6*  1.6*  1.6*   ALKPHOS  178*   ALT  68*   AST  39   BILITOT  6.9*     Labs within the past 24 hours have been reviewed.    Diagnostic Results:  I have personally reviewed all pertinent imaging studies.

## 2018-10-15 NOTE — ASSESSMENT & PLAN NOTE
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   -POD 1 US: increased arterial resistive indices, suggestive of edema vs cute rejection vs congestion  -Repeat US 10/9 with continued elevation of RIs  -LFTs trending down  -T bili trending down  -repeat liver US (10/11): elevated RIs  - LFTs yet to normalize but are trending down.  - pt c/o right side pain, likely from dependent edema   -US 10/14 no signs of bleeding

## 2018-10-15 NOTE — ASSESSMENT & PLAN NOTE
- Diuresed w/ Lasix 100mg x 1 (10/12).  - started Lasix 80 mg bid per Nephrology 10/12.  - CRRT 10/14.  .    -Restarted Lasix 60 BID  + Alb 25% x 1 on 10/15

## 2018-10-15 NOTE — PROGRESS NOTES
"[facility]  Endocrinology  Progress Note    Admit Date: 10/1/2018     Reason for Consult: Management of type 2 DM, Hyperglycemia      Surgical Procedure and Date: Liver transplant 10/5/18, Ex lap 10/6/18     Diabetes diagnosis year:      Home Diabetes Medications:  Lantus 18 units HS and novolog 17 units with meals plus correction scale (150-200 +1)        Lab Results   Component Value Date     HGBA1C 6.8 (H) 2018        How often checking glucose at home? 3-4   BG readings on regimen: 120-150.  Post prandial readings as high as upper 200s  Hypoglycemia on the regimen? yes, none recently   Missed doses on regimen?  No     Diabetes Complications include:     Diabetic peripheral neuropathy      Complicating diabetes co morbidities:   CIRRHOSIS        HPI:  Patient is a 68 y.o. female with a diagnosis of ESLD, listed for liver transplant with meld 23 who underwent live transplant on 10/5/18.  Back to OR on 10/6/18 for ex lap.  Admitted 10/1/18 for hyponatremia s/p paracentesis. She speaks primarily German. Information obtained from recent past admission and discussion over the phone via  ([employer]). Family not at bedside. Pt request family at bedside for future conversations. Personal has known diagnosis of diabetes, endocrine consulted for diabetes management.        Interval HPI:   Overnight events: remains in TSU. . FBG remains below goal with decreased levemir dose. Dialysis today- may impact BG.  Prednisone 20 mg daily; standard steroid taper.   Eatin-50% of meals; boost between meals   Nausea: No  Hypoglycemia and intervention: No  Fever: No  TPN and/or TF: No    /72   Pulse 84   Temp 97.7 °F (36.5 °C)   Resp 10   Ht 5' 3" (1.6 m)   Wt 66.3 kg (146 lb 2.6 oz)   LMP  (LMP Unknown)   SpO2 98%   Breastfeeding? No   BMI 25.89 kg/m²      Labs Reviewed and Include    Recent Labs   Lab  10/15/18   0402   GLU  108  108  108   CALCIUM  7.7*  7.7*  7.7* "   ALBUMIN  2.3*  2.3*  2.3*   PROT  3.9*   NA  131*  131*  131*   K  3.7  3.7  3.7   CO2  26  26  26   CL  95  95  95   BUN  70*  70*  70*   CREATININE  1.6*  1.6*  1.6*   ALKPHOS  178*   ALT  68*   AST  39   BILITOT  6.9*     Lab Results   Component Value Date    WBC 12.29 10/15/2018    HGB 8.7 (L) 10/15/2018    HCT 25.0 (L) 10/15/2018    MCV 85 10/15/2018    PLT 91 (L) 10/15/2018     No results for input(s): TSH, FREET4 in the last 168 hours.  Lab Results   Component Value Date    HGBA1C 6.0 (H) 10/04/2018       Nutritional status:   Body mass index is 25.89 kg/m².  Lab Results   Component Value Date    ALBUMIN 2.3 (L) 10/15/2018    ALBUMIN 2.3 (L) 10/15/2018    ALBUMIN 2.3 (L) 10/15/2018     Lab Results   Component Value Date    PREALBUMIN 12 (L) 09/17/2018       Estimated Creatinine Clearance: 30.4 mL/min (A) (based on SCr of 1.6 mg/dL (H)).    Accu-Checks  Recent Labs      10/12/18   1240  10/12/18   1722  10/12/18   2037  10/13/18   0227  10/13/18   0826  10/13/18   1328  10/13/18   1917  10/14/18   0308  10/14/18   0826  10/14/18   2053   POCTGLUCOSE  225*  283*  272*  224*  175*  197*  187*  179*  85  220*       Current Medications and/or Treatments Impacting Glycemic Control  Immunotherapy:    Immunosuppressants         Stop Route Frequency     tacrolimus capsule 0.5 mg      -- Oral Daily     mycophenolate capsule 1,000 mg      -- Oral 2 times daily        Steroids:   Hormones (From admission, onward)    Start     Stop Route Frequency Ordered    10/11/18 0900  predniSONE tablet 20 mg      10/18 0859 Oral Daily 10/10/18 0829        Pressors:    Autonomic Drugs (From admission, onward)    Start     Stop Route Frequency Ordered    10/10/18 1500  midodrine tablet 15 mg      -- Oral 3 times daily 10/10/18 1122    10/06/18 1617  rocuronium (ZEMURON) 10 mg/mL injection     Comments:  Created by cabinet override    10/07 0429   10/06/18 1617        Hyperglycemia/Diabetes Medications:    Antihyperglycemics (From admission, onward)    Start     Stop Route Frequency Ordered    10/14/18 2100  insulin detemir U-100 pen 14 Units      -- SubQ Nightly 10/14/18 0825    10/14/18 1130  insulin aspart U-100 pen 9 Units      -- SubQ 3 times daily with meals 10/14/18 0825    10/14/18 0925  insulin aspart U-100 pen 0-5 Units      -- SubQ Before meals & nightly PRN 10/14/18 0825    10/07/18 1200  insulin regular (Humulin R) 100 Units in sodium chloride 0.9% 100 mL infusion      10/13 2100 IV Continuous 10/07/18 1055          ASSESSMENT and PLAN    * Liver transplanted    Managed per primary team  Avoid hypoglycemia    Recent Labs   Lab  10/14/18   0520   ALT  74*   AST  40   ALKPHOS  166*   BILITOT  7.5*   PROT  3.7*   ALBUMIN  2.2*               Type 2 diabetes mellitus with diabetic polyneuropathy, with long-term current use of insulin    BG goal 140 - 180     Decrease to Levemir 12 units HS.   Continue Novolog 9 units with meals.   2 units PRN protein shake  Low dose correction scale insulin  BG monitoring AC/HS/0200        Discharge planning:  TBD        Corticosteroids adverse reaction    On standard steroid taper per transplant team; may elevate BG readings            EVA (acute kidney injury)    Avoid insulin stacking and hypoglycemia.  Lab Results   Component Value Date    CREATININE 2.6 (H) 10/14/2018               Prophylactic immunotherapy    May increase insulin resistance.               Aviva Caldera NP  Endocrinology  [facility]

## 2018-10-16 PROBLEM — D62 ACUTE BLOOD LOSS ANEMIA: Status: ACTIVE | Noted: 2018-10-16

## 2018-10-16 LAB
ALBUMIN SERPL BCP-MCNC: 2.5 G/DL
ALP SERPL-CCNC: 139 U/L
ALT SERPL W/O P-5'-P-CCNC: 48 U/L
ANION GAP SERPL CALC-SCNC: 14 MMOL/L
APTT BLDCRRT: 26.7 SEC
AST SERPL-CCNC: 30 U/L
BASOPHILS # BLD AUTO: 0 K/UL
BASOPHILS # BLD AUTO: 0.01 K/UL
BASOPHILS NFR BLD: 0 %
BASOPHILS NFR BLD: 0.1 %
BILIRUB SERPL-MCNC: 6.2 MG/DL
BLD PROD TYP BPU: NORMAL
BLOOD UNIT EXPIRATION DATE: NORMAL
BLOOD UNIT TYPE CODE: 6200
BLOOD UNIT TYPE: NORMAL
BUN SERPL-MCNC: 114 MG/DL
CALCIUM SERPL-MCNC: 8.2 MG/DL
CHLORIDE SERPL-SCNC: 91 MMOL/L
CO2 SERPL-SCNC: 25 MMOL/L
CODING SYSTEM: NORMAL
CREAT SERPL-MCNC: 2.2 MG/DL
DIFFERENTIAL METHOD: ABNORMAL
DIFFERENTIAL METHOD: ABNORMAL
DISPENSE STATUS: NORMAL
EOSINOPHIL # BLD AUTO: 0 K/UL
EOSINOPHIL # BLD AUTO: 0 K/UL
EOSINOPHIL NFR BLD: 0 %
EOSINOPHIL NFR BLD: 0.2 %
ERYTHROCYTE [DISTWIDTH] IN BLOOD BY AUTOMATED COUNT: 15.4 %
ERYTHROCYTE [DISTWIDTH] IN BLOOD BY AUTOMATED COUNT: 16.1 %
EST. GFR  (AFRICAN AMERICAN): 25.6 ML/MIN/1.73 M^2
EST. GFR  (NON AFRICAN AMERICAN): 22.2 ML/MIN/1.73 M^2
GLUCOSE SERPL-MCNC: 165 MG/DL
HAPTOGLOB SERPL-MCNC: <10 MG/DL
HCT VFR BLD AUTO: 20.4 %
HCT VFR BLD AUTO: 20.4 %
HCT VFR BLD AUTO: 25.5 %
HGB BLD-MCNC: 6.9 G/DL
HGB BLD-MCNC: 7.2 G/DL
HGB BLD-MCNC: 8.8 G/DL
IMM GRANULOCYTES # BLD AUTO: 0.11 K/UL
IMM GRANULOCYTES # BLD AUTO: 0.14 K/UL
IMM GRANULOCYTES NFR BLD AUTO: 0.8 %
IMM GRANULOCYTES NFR BLD AUTO: 1.1 %
INR PPP: 1.1
LDH SERPL L TO P-CCNC: 298 U/L
LYMPHOCYTES # BLD AUTO: 0.1 K/UL
LYMPHOCYTES # BLD AUTO: 0.3 K/UL
LYMPHOCYTES NFR BLD: 0.7 %
LYMPHOCYTES NFR BLD: 2.5 %
MAGNESIUM SERPL-MCNC: 1.6 MG/DL
MCH RBC QN AUTO: 30.2 PG
MCH RBC QN AUTO: 30.6 PG
MCHC RBC AUTO-ENTMCNC: 34.5 G/DL
MCHC RBC AUTO-ENTMCNC: 35.3 G/DL
MCV RBC AUTO: 87 FL
MCV RBC AUTO: 88 FL
MONOCYTES # BLD AUTO: 0.2 K/UL
MONOCYTES # BLD AUTO: 0.4 K/UL
MONOCYTES NFR BLD: 1.3 %
MONOCYTES NFR BLD: 3.8 %
NEUTROPHILS # BLD AUTO: 16.2 K/UL
NEUTROPHILS # BLD AUTO: 9.3 K/UL
NEUTROPHILS NFR BLD: 92.4 %
NEUTROPHILS NFR BLD: 97.1 %
NRBC BLD-RTO: 0 /100 WBC
NRBC BLD-RTO: 0 /100 WBC
PHOSPHATE SERPL-MCNC: 4.3 MG/DL
PLATELET # BLD AUTO: 69 K/UL
PLATELET # BLD AUTO: 78 K/UL
PMV BLD AUTO: 11.2 FL
PMV BLD AUTO: 12.2 FL
POCT GLUCOSE: 172 MG/DL (ref 70–110)
POCT GLUCOSE: 189 MG/DL (ref 70–110)
POCT GLUCOSE: 203 MG/DL (ref 70–110)
POCT GLUCOSE: 262 MG/DL (ref 70–110)
POCT GLUCOSE: 293 MG/DL (ref 70–110)
POCT GLUCOSE: 354 MG/DL (ref 70–110)
POCT GLUCOSE: 363 MG/DL (ref 70–110)
POTASSIUM SERPL-SCNC: 3.7 MMOL/L
PROT SERPL-MCNC: 3.9 G/DL
PROTHROMBIN TIME: 11.3 SEC
RBC # BLD AUTO: 2.35 M/UL
RBC # BLD AUTO: 2.91 M/UL
SODIUM SERPL-SCNC: 130 MMOL/L
TACROLIMUS BLD-MCNC: 3.5 NG/ML
TRANS ERYTHROCYTES VOL PATIENT: NORMAL ML
WBC # BLD AUTO: 10.1 K/UL
WBC # BLD AUTO: 16.72 K/UL

## 2018-10-16 PROCEDURE — 99232 SBSQ HOSP IP/OBS MODERATE 35: CPT | Mod: ,,, | Performed by: NURSE PRACTITIONER

## 2018-10-16 PROCEDURE — 87077 CULTURE AEROBIC IDENTIFY: CPT

## 2018-10-16 PROCEDURE — 25000003 PHARM REV CODE 250: Performed by: NURSE PRACTITIONER

## 2018-10-16 PROCEDURE — 63600175 PHARM REV CODE 636 W HCPCS: Performed by: NURSE PRACTITIONER

## 2018-10-16 PROCEDURE — 83735 ASSAY OF MAGNESIUM: CPT

## 2018-10-16 PROCEDURE — 99900035 HC TECH TIME PER 15 MIN (STAT)

## 2018-10-16 PROCEDURE — 25000003 PHARM REV CODE 250: Performed by: TRANSPLANT SURGERY

## 2018-10-16 PROCEDURE — 63600175 PHARM REV CODE 636 W HCPCS: Performed by: SURGERY

## 2018-10-16 PROCEDURE — 82272 OCCULT BLD FECES 1-3 TESTS: CPT

## 2018-10-16 PROCEDURE — 94799 UNLISTED PULMONARY SVC/PX: CPT

## 2018-10-16 PROCEDURE — P9021 RED BLOOD CELLS UNIT: HCPCS

## 2018-10-16 PROCEDURE — 85014 HEMATOCRIT: CPT

## 2018-10-16 PROCEDURE — 87040 BLOOD CULTURE FOR BACTERIA: CPT

## 2018-10-16 PROCEDURE — 63600175 PHARM REV CODE 636 W HCPCS: Performed by: PHYSICIAN ASSISTANT

## 2018-10-16 PROCEDURE — 94761 N-INVAS EAR/PLS OXIMETRY MLT: CPT

## 2018-10-16 PROCEDURE — 20600001 HC STEP DOWN PRIVATE ROOM

## 2018-10-16 PROCEDURE — 99233 SBSQ HOSP IP/OBS HIGH 50: CPT | Mod: ,,, | Performed by: INTERNAL MEDICINE

## 2018-10-16 PROCEDURE — 87086 URINE CULTURE/COLONY COUNT: CPT

## 2018-10-16 PROCEDURE — 97116 GAIT TRAINING THERAPY: CPT

## 2018-10-16 PROCEDURE — 97110 THERAPEUTIC EXERCISES: CPT

## 2018-10-16 PROCEDURE — C9113 INJ PANTOPRAZOLE SODIUM, VIA: HCPCS | Performed by: PHYSICIAN ASSISTANT

## 2018-10-16 PROCEDURE — 87186 SC STD MICRODIL/AGAR DIL: CPT

## 2018-10-16 PROCEDURE — 85025 COMPLETE CBC W/AUTO DIFF WBC: CPT

## 2018-10-16 PROCEDURE — 85610 PROTHROMBIN TIME: CPT

## 2018-10-16 PROCEDURE — 84100 ASSAY OF PHOSPHORUS: CPT

## 2018-10-16 PROCEDURE — 99233 SBSQ HOSP IP/OBS HIGH 50: CPT | Mod: 24,,, | Performed by: PHYSICIAN ASSISTANT

## 2018-10-16 PROCEDURE — 80053 COMPREHEN METABOLIC PANEL: CPT

## 2018-10-16 PROCEDURE — 94664 DEMO&/EVAL PT USE INHALER: CPT

## 2018-10-16 PROCEDURE — 36415 COLL VENOUS BLD VENIPUNCTURE: CPT

## 2018-10-16 PROCEDURE — 83615 LACTATE (LD) (LDH) ENZYME: CPT

## 2018-10-16 PROCEDURE — 36430 TRANSFUSION BLD/BLD COMPNT: CPT

## 2018-10-16 PROCEDURE — 80197 ASSAY OF TACROLIMUS: CPT

## 2018-10-16 PROCEDURE — 25000003 PHARM REV CODE 250: Performed by: PHYSICIAN ASSISTANT

## 2018-10-16 PROCEDURE — 83010 ASSAY OF HAPTOGLOBIN QUANT: CPT

## 2018-10-16 PROCEDURE — 63600175 PHARM REV CODE 636 W HCPCS: Performed by: STUDENT IN AN ORGANIZED HEALTH CARE EDUCATION/TRAINING PROGRAM

## 2018-10-16 PROCEDURE — 85730 THROMBOPLASTIN TIME PARTIAL: CPT

## 2018-10-16 PROCEDURE — 87088 URINE BACTERIA CULTURE: CPT

## 2018-10-16 PROCEDURE — 87040 BLOOD CULTURE FOR BACTERIA: CPT | Mod: 59

## 2018-10-16 PROCEDURE — 85018 HEMOGLOBIN: CPT

## 2018-10-16 RX ORDER — INSULIN ASPART 100 [IU]/ML
11 INJECTION, SOLUTION INTRAVENOUS; SUBCUTANEOUS
Status: DISCONTINUED | OUTPATIENT
Start: 2018-10-16 | End: 2018-10-16

## 2018-10-16 RX ORDER — INSULIN ASPART 100 [IU]/ML
13 INJECTION, SOLUTION INTRAVENOUS; SUBCUTANEOUS
Status: DISCONTINUED | OUTPATIENT
Start: 2018-10-16 | End: 2018-10-17

## 2018-10-16 RX ORDER — FUROSEMIDE 10 MG/ML
80 INJECTION INTRAMUSCULAR; INTRAVENOUS 2 TIMES DAILY
Status: DISCONTINUED | OUTPATIENT
Start: 2018-10-16 | End: 2018-10-23

## 2018-10-16 RX ORDER — HYDROCODONE BITARTRATE AND ACETAMINOPHEN 500; 5 MG/1; MG/1
TABLET ORAL
Status: DISCONTINUED | OUTPATIENT
Start: 2018-10-16 | End: 2018-10-16

## 2018-10-16 RX ORDER — TACROLIMUS 0.5 MG/1
0.5 CAPSULE ORAL 2 TIMES DAILY
Status: DISCONTINUED | OUTPATIENT
Start: 2018-10-16 | End: 2018-10-18

## 2018-10-16 RX ORDER — PANTOPRAZOLE SODIUM 40 MG/10ML
40 INJECTION, POWDER, LYOPHILIZED, FOR SOLUTION INTRAVENOUS 2 TIMES DAILY
Status: DISCONTINUED | OUTPATIENT
Start: 2018-10-16 | End: 2018-11-08

## 2018-10-16 RX ORDER — VALGANCICLOVIR 450 MG/1
450 TABLET, FILM COATED ORAL
Status: DISCONTINUED | OUTPATIENT
Start: 2018-10-18 | End: 2018-10-31

## 2018-10-16 RX ADMIN — MIRTAZAPINE 7.5 MG: 7.5 TABLET ORAL at 09:10

## 2018-10-16 RX ADMIN — URSODIOL 300 MG: 300 CAPSULE ORAL at 08:10

## 2018-10-16 RX ADMIN — MIDODRINE HYDROCHLORIDE 15 MG: 5 TABLET ORAL at 02:10

## 2018-10-16 RX ADMIN — ERGOCALCIFEROL 50000 UNITS: 1.25 CAPSULE ORAL at 08:10

## 2018-10-16 RX ADMIN — PREDNISONE 20 MG: 10 TABLET ORAL at 08:10

## 2018-10-16 RX ADMIN — DOCUSATE SODIUM 50 MG: 50 CAPSULE, LIQUID FILLED ORAL at 02:10

## 2018-10-16 RX ADMIN — INSULIN ASPART 9 UNITS: 100 INJECTION, SOLUTION INTRAVENOUS; SUBCUTANEOUS at 08:10

## 2018-10-16 RX ADMIN — HEPARIN SODIUM 5000 UNITS: 5000 INJECTION, SOLUTION INTRAVENOUS; SUBCUTANEOUS at 06:10

## 2018-10-16 RX ADMIN — TACROLIMUS 0.5 MG: 0.5 CAPSULE ORAL at 05:10

## 2018-10-16 RX ADMIN — MIDODRINE HYDROCHLORIDE 15 MG: 5 TABLET ORAL at 09:10

## 2018-10-16 RX ADMIN — INSULIN ASPART 11 UNITS: 100 INJECTION, SOLUTION INTRAVENOUS; SUBCUTANEOUS at 12:10

## 2018-10-16 RX ADMIN — FUROSEMIDE 80 MG: 10 INJECTION, SOLUTION INTRAMUSCULAR; INTRAVENOUS at 05:10

## 2018-10-16 RX ADMIN — FLUCONAZOLE 200 MG: 200 TABLET ORAL at 08:10

## 2018-10-16 RX ADMIN — MIDODRINE HYDROCHLORIDE 15 MG: 5 TABLET ORAL at 08:10

## 2018-10-16 RX ADMIN — PANTOPRAZOLE SODIUM 40 MG: 40 TABLET, DELAYED RELEASE ORAL at 08:10

## 2018-10-16 RX ADMIN — MYCOPHENOLATE MOFETIL 1000 MG: 250 CAPSULE ORAL at 08:10

## 2018-10-16 RX ADMIN — HEPARIN SODIUM 5000 UNITS: 5000 INJECTION, SOLUTION INTRAVENOUS; SUBCUTANEOUS at 02:10

## 2018-10-16 RX ADMIN — HEPARIN SODIUM 5000 UNITS: 5000 INJECTION, SOLUTION INTRAVENOUS; SUBCUTANEOUS at 09:10

## 2018-10-16 RX ADMIN — URSODIOL 300 MG: 300 CAPSULE ORAL at 09:10

## 2018-10-16 RX ADMIN — MYCOPHENOLATE MOFETIL 1000 MG: 250 CAPSULE ORAL at 09:10

## 2018-10-16 RX ADMIN — SODIUM BICARBONATE 650 MG TABLET 1300 MG: at 05:10

## 2018-10-16 RX ADMIN — DOCUSATE SODIUM 50 MG: 50 CAPSULE, LIQUID FILLED ORAL at 08:10

## 2018-10-16 RX ADMIN — INSULIN ASPART 13 UNITS: 100 INJECTION, SOLUTION INTRAVENOUS; SUBCUTANEOUS at 05:10

## 2018-10-16 RX ADMIN — INSULIN ASPART 5 UNITS: 100 INJECTION, SOLUTION INTRAVENOUS; SUBCUTANEOUS at 09:10

## 2018-10-16 RX ADMIN — SODIUM BICARBONATE 650 MG TABLET 1300 MG: at 09:10

## 2018-10-16 RX ADMIN — INSULIN ASPART 5 UNITS: 100 INJECTION, SOLUTION INTRAVENOUS; SUBCUTANEOUS at 05:10

## 2018-10-16 RX ADMIN — SODIUM BICARBONATE 650 MG TABLET 1300 MG: at 08:10

## 2018-10-16 RX ADMIN — PANTOPRAZOLE SODIUM 40 MG: 40 INJECTION, POWDER, FOR SOLUTION INTRAVENOUS at 09:10

## 2018-10-16 RX ADMIN — INSULIN ASPART 3 UNITS: 100 INJECTION, SOLUTION INTRAVENOUS; SUBCUTANEOUS at 12:10

## 2018-10-16 RX ADMIN — SODIUM BICARBONATE 650 MG TABLET 1300 MG: at 02:10

## 2018-10-16 RX ADMIN — TACROLIMUS 0.5 MG: 0.5 CAPSULE ORAL at 08:10

## 2018-10-16 NOTE — SUBJECTIVE & OBJECTIVE
Interval History: Patient is verbal and communicative. Continues to complaint of bilateral peripheral edema up to hip region. Also reports black stools for the past 3-4 days, abdominal distention and fatigue. Denies chest pain, dyspnea, N/V, diarrhea or constipation.    Review of patient's allergies indicates:  No Known Allergies  Current Facility-Administered Medications   Medication Frequency    bisacodyl EC tablet 10 mg QHS    bisacodyl suppository 10 mg Daily PRN    calcium carbonate 200 mg calcium (500 mg) chewable tablet 500 mg TID PRN    dextrose 50% injection 12.5 g PRN    dextrose 50% injection 25 g PRN    docusate sodium capsule 50 mg TID    entecavir tablet 0.5 mg Q72H    ergocalciferol capsule 50,000 Units Q7 Days    fluconazole tablet 200 mg Daily    glucagon (human recombinant) injection 1 mg PRN    glucose chewable tablet 16 g PRN    glucose chewable tablet 24 g PRN    heparin (porcine) injection 5,000 Units Q8H    insulin aspart U-100 pen 0-5 Units QID (AC + HS) PRN    insulin aspart U-100 pen 11 Units TIDWM    insulin aspart U-100 pen 2 Units PRN    insulin detemir U-100 pen 12 Units QHS    midodrine tablet 15 mg TID    mirtazapine tablet 7.5 mg QHS    mycophenolate capsule 1,000 mg BID    ondansetron disintegrating tablet 8 mg Q8H PRN    oxyCODONE immediate release tablet 5 mg Q4H PRN    oxyCODONE immediate release tablet Tab 10 mg Q4H PRN    pantoprazole EC tablet 40 mg Daily    polyethylene glycol packet 17 g Daily PRN    predniSONE tablet 20 mg Daily    promethazine (PHENERGAN) 6.25 mg in dextrose 5 % 50 mL IVPB Q6H PRN    sodium bicarbonate tablet 1,300 mg QID    sodium chloride 0.9% flush 3 mL PRN    sulfamethoxazole-trimethoprim 400-80mg per tablet 1 tablet Every Mon, Wed, Fri    tacrolimus capsule 0.5 mg BID    ursodiol capsule 300 mg BID    [START ON 10/18/2018] valGANciclovir tablet 450 mg Twice Weekly       Objective:     Vital Signs (Most Recent):  Temp:  97.5 °F (36.4 °C) (10/16/18 1155)  Pulse: 86 (10/16/18 1200)  Resp: 15 (10/16/18 1155)  BP: 103/68 (10/16/18 1155)  SpO2: 99 % (10/16/18 1155)  O2 Device (Oxygen Therapy): room air (10/16/18 1155) Vital Signs (24h Range):  Temp:  [97 °F (36.1 °C)-98.1 °F (36.7 °C)] 97.5 °F (36.4 °C)  Pulse:  [] 86  Resp:  [10-15] 15  SpO2:  [97 %-100 %] 99 %  BP: ()/(56-75) 103/68     Weight: 70 kg (154 lb 4.8 oz) (10/16/18 0416)  Body mass index is 27.33 kg/m².  Body surface area is 1.76 meters squared.    I/O last 3 completed shifts:  In: 2543.3 [P.O.:1200; I.V.:1343.3]  Out: 2374 [Urine:1150; Other:1224]    Physical Exam  Constitutional: She is oriented to person, place, and time. She appears well-developed.   Temporal and distal extremity muscle wasting; yellowing of skin  Eyes: EOM are normal. Pupils are equal, round, and reactive to light. Scleral icterus is present.   Neck: Normal range of motion.   Cardiovascular: Normal rate and regular rhythm.   No murmur heard.  Pulmonary/Chest: Effort normal and breath sounds normal. No respiratory distress. She has no wheezes.   Abdominal: Soft. Bowel sounds are normal. She exhibits distension. There is no tenderness.   Chevron incision w/ staples, CDI  Musculoskeletal: Normal range of motion. She exhibits edema (BL lower extremities).   Neurological: She is alert and oriented to person, place, and time.   Skin: Skin is warm and dry.   Nursing note and vitals reviewed.    Significant Labs:  CBC:   Recent Labs   Lab  10/16/18   0601  10/16/18   0728   WBC  10.10   --    RBC  2.35*   --    HGB  7.2*  6.9*   HCT  20.4*  20.4*   PLT  78*   --    MCV  87   --    MCH  30.6   --    MCHC  35.3   --      CMP:   Recent Labs   Lab  10/16/18   0601   GLU  165*   CALCIUM  8.2*   ALBUMIN  2.5*   PROT  3.9*   NA  130*   K  3.7   CO2  25   CL  91*   BUN  114*   CREATININE  2.2*   ALKPHOS  139*   ALT  48*   AST  30   BILITOT  6.2*     Recent Labs   Lab  10/15/18   1416   COLORU  Stacy    SPECGRAV  1.015   PHUR  5.0   PROTEINUA  Negative   NITRITE  Negative   LEUKOCYTESUR  Negative   UROBILINOGEN  Negative     All labs within the past 24 hours have been reviewed.     Significant Imaging:  US Liver Transplant (10/15):   Impression         Slight interval improvement in the left hepatic artery resistive indices with persistent stable elevation of the remaining hepatic resistive indices.  Findings are favored to be secondary to liver edema.    Reducing peritransplant fluid collections.    Mild volume ascites.    Partially visualized right pleural effusion.      10/11/2018 US Retroperitoneal:             FINDINGS:  Right kidney: Normal in size measuring 9.8 cm. Increased cortical echogenicity.  No loss of corticomedullary distinction. Resistive index measures 0.78.  No mass. No renal stone. No hydronephrosis.    Left kidney: Normal in size measuring 10.0 cm. Increased cortical echogenicity.  No loss of corticomedullary distinction. Resistive index measures 0.77.  No mass. No renal stone. No hydronephrosis.    The bladder is partially distended at the time of scanning and has an unremarkable appearance.    Mild volume ascites.       Impression         Upper normal to mildly elevated renal resistive indices suggestive of medical renal disease.    Ascites.

## 2018-10-16 NOTE — PT/OT/SLP PROGRESS
Occupational Therapy   Treatment    Name: Scarlett Reddy  MRN: [id number]  Admitting Diagnosis:  Liver transplanted  10 Days Post-Op    Recommendations:     Discharge Recommendations: home health OT  Discharge Equipment Recommendations:  walker, rolling  Barriers to discharge:  None    Subjective     Pt. Initially reporting that she was seeing spots on OT arrival.  Nursing assessed pt. And cleared pt. For therapy session.     Communicated with: nurse prior to session.  Pain/Comfort:  · Pain Rating 1: 6/10  · Location - Side 1: Bilateral  · Location - Orientation 1: lower  · Location 1: leg  · Pain Addressed 1: Reposition, Distraction, Nurse notified  · Pain Rating Post-Intervention 1: 6/10    Patients cultural, spiritual, Muslim conflicts given the current situation: none reported    Objective:     Patient found with: pulse ox (continuous), telemetry, peripheral IV(seated in bedside chair with multiple family members present and / receiving blood)    General Precautions: Standard, fall, contact   Orthopedic Precautions:N/A   Braces: N/A     Occupational Performance:    Bed Mobility:    · Not tested 2/2 pt. Up in chair     Functional Mobility/Transfers:  · Patient completed Sit <> Stand Transfer with minimum assistance  with  rolling walker   · Functional Mobility: Pt. Engaged in multiple trials for Marching in place with RW and Min A 2/2 receiving blood and initially having some visual concerns on OT arrival which nursing assessed and cleared pt. For therapy    Activities of Daily Living:  · Not performed on this date    Patient left up in chair with call button in reach  Family and  present and nurse notified.   [facility] [address] 6 Click:  [facility] [address] Total Score: 16    Treatment & Education:  Pt. Performed 1 set 10 reps of AROM there ex for all planes of BUE/LE motion seated in bedside chair  Pt. Educated on proper hand placement for transfers   Pt. Performed sit to stand transfers x 2 trials from bedside chair  with Min A  Pt. Engaged in marching inplace activity x ~ 4 sets  Of 10 for 2 sets  With a seated rest break between trials.   Education:    Assessment:     Scarlett Reddy is a 68 y.o. female with a medical diagnosis of Liver transplanted.  She presents with deficits in endurance, self-care skills, functional mobility as well as pain on this date. Pt. Participated in session well.,  Pt. Would benefit from continued OT services.  Performance deficits affecting function are impaired endurance, impaired self care skills, impaired functional mobilty, pain.      Rehab Prognosis:  Good; patient would benefit from acute skilled OT services to address these deficits and reach maximum level of function.       Plan:     Patient to be seen 4 x/week to address the above listed problems via self-care/home management, therapeutic activities, therapeutic exercises  · Plan of Care Expires: 11/07/18  · Plan of Care Reviewed with: patient, family    This Plan of care has been discussed with the patient who was involved in its development and understands and is in agreement with the identified goals and treatment plan    GOALS:   Multidisciplinary Problems     Occupational Therapy Goals        Problem: Occupational Therapy Goal    Goal Priority Disciplines Outcome Interventions   Occupational Therapy Goal     OT, PT/OT Ongoing (interventions implemented as appropriate)    Description:  Goals to be met by: 10/22/18     Patient will increase functional independence with ADLs by performing:    Feeding with [place].  UE Dressing with Supervision.  LE Dressing with Supervision.  Grooming while standing at sink with Supervision.  Toileting from toilet with Supervision for hygiene and clothing management.   Toilet transfer to toilet with Supervision.                      Time Tracking:     OT Date of Treatment: 10/16/18  OT Start Time: 1052  OT Stop Time: 1106  OT Total Time (min): 14 min    Billable Minutes:Therapeutic Exercise  14    ELLIS Marks  10/16/2018

## 2018-10-16 NOTE — ASSESSMENT & PLAN NOTE
- dietary consulted.   - temporal and distal extremity muscle wasting and edema  - encourage supplements and to increase nutritional intake   - pt on renal/low K+ diet

## 2018-10-16 NOTE — PLAN OF CARE
Problem: Occupational Therapy Goal  Goal: Occupational Therapy Goal  Goals to be met by: 10/22/18     Patient will increase functional independence with ADLs by performing:    Feeding with [place].  UE Dressing with Supervision.  LE Dressing with Supervision.  Grooming while standing at sink with Supervision.  Toileting from toilet with Supervision for hygiene and clothing management.   Toilet transfer to toilet with Supervision.     Pt. Tolerated session well. Receiving blood but approved for therapy session.

## 2018-10-16 NOTE — PT/OT/SLP PROGRESS
Physical Therapy Treatment    Patient Name:  Scarlett Reddy   MRN:  [id number]    Recommendations:     Discharge Recommendations:  home health PT, home health OT   Discharge Equipment Recommendations: walker, rolling   Barriers to discharge: None    Assessment:     Scarlett Reddy is a 68 y.o. female admitted with a medical diagnosis of Liver transplanted.  She presents with the following impairments/functional limitations:  weakness, gait instability, impaired endurance, impaired balance, impaired cardiopulmonary response to activity, decreased safety awareness, impaired self care skills, impaired functional mobilty, edema. Pt able to complete functional mobility with CGA and RW. Ambulated greater than household distance but with impaired endurance and generalized weakness noted, requiring multiple standing rest breaks throughout. Pt would continue to benefit from skilled acute PT in order to address current deficits and progress functional mobility.     Rehab Prognosis:  good; patient would benefit from acute skilled PT services to address these deficits and reach maximum level of function.      Recent Surgery: Procedure(s) (LRB):  LAPAROTOMY, EXPLORATORY, AFTER LIVER TRANSPLANT (N/A) 10 Days Post-Op    Plan:     During this hospitalization, patient to be seen 4 x/week to address the above listed problems via gait training, therapeutic activities, therapeutic exercises, neuromuscular re-education  · Plan of Care Expires:  11/05/18   Plan of Care Reviewed with: patient, daughter    Subjective     Communicated with RN prior to session.  Patient found UIC upon PT entry to room, agreeable to treatment.      Chief Complaint: none noted   Pain/Comfort:  · Pain Rating 1: 0/10    Patients cultural, spiritual, Temple conflicts given the current situation: none noted     Objective:     Patient found with: telemetry, pulse ox (continuous)     General Precautions: Standard, fall, contact   Orthopedic Precautions:N/A   Braces:  N/A     Functional Mobility:  · Transfers:     · Sit to Stand:  contact guard assistance with rolling walker  · Cues for transfer technique with RW   · Gait: ~175 ft. with RW and CGA  · Chair follow throughout, but no seated rest break needed. Multiple intermittent standing rest breaks taken throughout.  · Decreased giuseppe, decreased step length, impaired weight-shifting ability, decreased toe-floor clearance, forward lean   · Cues for upright posture, forward gaze, and maintaining close proximity to RW       AM-PAC 6 CLICK MOBILITY  Turning over in bed (including adjusting bedclothes, sheets and blankets)?: 3  Sitting down on and standing up from a chair with arms (e.g., wheelchair, bedside commode, etc.): 3  Moving from lying on back to sitting on the side of the bed?: 3  Moving to and from a bed to a chair (including a wheelchair)?: 3  Need to walk in hospital room?: 3  Climbing 3-5 steps with a railing?: 2  Basic Mobility Total Score: 17     Patient left up in chair with all lines intact, call button in reach and pt's daughter and  present..    GOALS:   Multidisciplinary Problems     Physical Therapy Goals        Problem: Physical Therapy Goal    Goal Priority Disciplines Outcome Goal Variances Interventions   Physical Therapy Goal     PT, PT/OT Ongoing (interventions implemented as appropriate)     Description:  Goals to be met by: 10/22/2018    Patient will increase functional independence with mobility by performin. Supine to sit with Contact Guard Assistance - not met  2. Sit to stand transfer with Supervision using LRAD or no AD - not met  3. Bed to chair transfer with Supervision using LRAD or no AD - not met  4. Gait  x 250 feet with Supervision using LRAD or no AD - not met  5. Stand for 3 minutes with Supervision to increase activity tolerance - not met   6. Lower extremity exercise program x15 reps per handout, with independence - not met                       Time Tracking:     PT  Received On: 10/16/18  PT Start Time: 1444     PT Stop Time: 1501  PT Total Time (min): 17 min     Billable Minutes: Gait Training 17    Treatment Type: Treatment  PT/PTA: PT     PTA Visit Number: 0     Radha Nguyen PT, DPT   10/16/2018

## 2018-10-16 NOTE — PROGRESS NOTES
[facility]  Liver Transplant  Progress Note    Patient Name: Scarlett Reddy  MRN: [id number]  Admission Date: 10/1/2018  Hospital Length of Stay: 14 days  Code Status: Full Code  Primary Care Provider: Primary Doctor No  Post-Operative Day: 11    ORGAN:   LIVER  Disease Etiology: Cirrhosis: Type B and D  Donor Type:    - Brain Death  CDC High Risk:   No  Donor CMV Status:   Donor CMV Status: Positive  Donor HBcAB:   Negative  Donor HCV Status:   Negative  Donor HBV SETH: Negative  Donor HCV SETH: Negative  Whole or Partial: Whole Liver  Biliary Anastomosis: End to End  Arterial Anatomy: Replaced Left Hepatic and Right Hepatic  Subjective:     History of Present Illness:  Ms. Reddy is a 67 y/o female with PMH of ESLD secondary Hep B and D.  Listed for liver transplant with MELD 23.  Paracentesis 10/1 with 5L removed, no fluid sent for analysis.  Morning labs significant for hyponatremia, Na 119.  Pt admitted to LTS for sodium monitoring.        Hospital Course:  Hospital Course: 67 y/o F h/o HBV/delta cirrhosis s/p DBDLT 10/5/18 (CMV D+/R+, steroid induction, MMF/tacro/pred maintenance) with take back on 10/6 2/2 hyperbilirubinema and bilious VALORIE drainage, no leak identified. Post-op course c/b hypercapneic and hypoxic respiratory failure and was reintubated though quickly extubated now doing well. Liver bx (10/6) sig for Zone 3 hemorrhage and collapse, in addition to cholestasis. Transferred from ICU on POD #4. ID consulted to comment on positive diphtheroid culture from ascitic fluid (likely skin colonization) as well as ESBL Klebsiella pneumoniae in urine culture (10/4).  Pt asymptomatic and cell count from ascitic fluid unremarkable. Per ID recs, no need to treat diphtheroids or asymptomatic ESBL Kleb pneumo bacteriuria though pt has had 6 days of therapy which would cover these organisms. Mild peritransplant ascites- repeat US 10/6 with increased arterial resistive indices. Repeat US 10/9  w/ continued elevation in RIs and fluid collections. LFTs trending down nicely. Acute gradual worsening of renal function s/p OLT.Creatinine on arrival 0.8 and has been up trending. Nephrology consulted for post-op EVA. Remains on renal/low K diet. Patient continued to be diuresed with lasix  mg (10/13) and 60 mg IV BID (10/14). On 10/14, had hyponatremia of 125 and  and underwent CRRT (10/15). Tolerated well.     Interval history: Patient still feeling weak this morning but improved since yesterday. Creatinine is continuing to rise, BUN elevated disproportionately. Per nephrology rec, no RRT needed at this time. Hb dropped today (6.9). Transfused 1 pRBC (10/16).  Of note, pt had dark stools yesterday but seem to have improved a little in color today. Concern for possible GI bleed. Will send for FOBT. No blood noted on MORELIA. Iron studies pending. LFTs and T bili cont to trend down. Denies N/V. VSS. Encouraged pt to increase nutritional intake. Will cont to monitor     Scheduled Meds:   bisacodyl  10 mg Oral QHS    docusate sodium  50 mg Oral TID    entecavir  0.5 mg Oral Q72H    ergocalciferol  50,000 Units Oral Q7 Days    fluconazole  200 mg Oral Daily    heparin (porcine)  5,000 Units Subcutaneous Q8H    insulin aspart U-100  11 Units Subcutaneous TIDWM    insulin detemir U-100  12 Units Subcutaneous QHS    midodrine  15 mg Oral TID    mirtazapine  7.5 mg Oral QHS    mycophenolate  1,000 mg Oral BID    pantoprazole  40 mg Oral Daily    predniSONE  20 mg Oral Daily    sodium bicarbonate  1,300 mg Oral QID    sulfamethoxazole-trimethoprim 400-80mg  1 tablet Oral Every Mon, Wed, Fri    tacrolimus  0.5 mg Oral BID    ursodiol  300 mg Oral BID    [START ON 10/18/2018] valGANciclovir  450 mg Oral Twice Weekly     Continuous Infusions:  PRN Meds:bisacodyl, calcium carbonate, dextrose 50%, dextrose 50%, glucagon (human recombinant), glucose, glucose, insulin aspart U-100, insulin aspart  U-100, ondansetron, oxyCODONE, oxyCODONE, polyethylene glycol, promethazine (PHENERGAN) IVPB, sodium chloride 0.9%    Review of Systems   Constitutional: Positive for activity change, appetite change (decreased) and fatigue. Negative for fever.   Respiratory: Negative for cough and shortness of breath.    Cardiovascular: Positive for leg swelling. Negative for chest pain.   Gastrointestinal: Positive for abdominal distention. Negative for abdominal pain, constipation, diarrhea, nausea and vomiting.   Genitourinary: Negative for decreased urine volume, difficulty urinating and dysuria.   Musculoskeletal: Negative for arthralgias and back pain.   Allergic/Immunologic: Positive for immunocompromised state.   Neurological: Positive for weakness. Negative for tremors.     Objective:     Vital Signs (Most Recent):  Temp: 97.5 °F (36.4 °C) (10/16/18 1155)  Pulse: 86 (10/16/18 1200)  Resp: 15 (10/16/18 1155)  BP: 103/68 (10/16/18 1155)  SpO2: 99 % (10/16/18 1155) Vital Signs (24h Range):  Temp:  [97 °F (36.1 °C)-98.1 °F (36.7 °C)] 97.5 °F (36.4 °C)  Pulse:  [] 86  Resp:  [10-15] 15  SpO2:  [97 %-100 %] 99 %  BP: ()/(56-75) 103/68     Weight: 70 kg (154 lb 4.8 oz)  Body mass index is 27.33 kg/m².    Intake/Output - Last 3 Shifts       10/14 0700 - 10/15 0659 10/15 0700 - 10/16 0659 10/16 0700 - 10/17 0659    P.O. 620 1080     I.V. (mL/kg) 1343.3 (20.3)      Blood 219  250    Total Intake(mL/kg) 2182.3 (32.9) 1080 (15.4) 250 (3.6)    Urine (mL/kg/hr) 695 (0.4) 925 (0.6) 400 (0.7)    Other 1224      Stool 0 0     Total Output 1919 925 400    Net +263.3 +155 -150           Stool Occurrence 1 x 0 x           Physical Exam   Constitutional: She is oriented to person, place, and time.   Temporal and distal extremity muscle wasting   Eyes: EOM are normal. Pupils are equal, round, and reactive to light. Scleral icterus is present.   Neck: Normal range of motion.   Cardiovascular: Normal rate and regular rhythm.   No  murmur heard.  Pulmonary/Chest: Effort normal and breath sounds normal. No respiratory distress. She has no wheezes.   Abdominal: Soft. Bowel sounds are normal. She exhibits distension. There is no tenderness.   Chevron incision w/ staples, CDI   Genitourinary:   Genitourinary Comments: No blood noted on MORELIA.    Musculoskeletal: Normal range of motion.   Neurological: She is alert and oriented to person, place, and time.   Skin: Skin is warm and dry.   Nursing note and vitals reviewed.      Laboratory:  Immunosuppressants         Stop Route Frequency     tacrolimus capsule 0.5 mg      -- Oral 2 times daily     mycophenolate capsule 1,000 mg      -- Oral 2 times daily        CBC:   Recent Labs   Lab  10/16/18   1442   WBC  16.72*   RBC  2.91*   HGB  8.8*   HCT  25.5*   PLT  69*   MCV  88   MCH  30.2   MCHC  34.5     CMP:   Recent Labs   Lab  10/16/18   0601   GLU  165*   CALCIUM  8.2*   ALBUMIN  2.5*   PROT  3.9*   NA  130*   K  3.7   CO2  25   CL  91*   BUN  114*   CREATININE  2.2*   ALKPHOS  139*   ALT  48*   AST  30   BILITOT  6.2*     Labs within the past 24 hours have been reviewed.    Diagnostic Results:  I have personally reviewed all pertinent imaging studies.    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   -POD 1 US: increased arterial resistive indices, suggestive of edema vs cute rejection vs congestion  -Repeat US 10/9 with continued elevation of RIs  -LFTs trending down  -T bili trending down  -repeat liver US (10/11): elevated RIs  - LFTs yet to normalize but are trending down.  - pt c/o right side pain, likely from dependent edema             Acute blood loss anemia    Patient has been transfused 1 pRBC on (10/14)  Hb today 6.9. Transfused 1 pRBC (10/16).   Possible concern of GI bleed in setting of post transplant, and complaints  of black stool.  - check FOBT               Post LT - Acute renal failure    - Creatinine on arrival 0.8 and has been up trending   BUN/cr (10/11/18): 91/1.9   Tacrolimus 26.9 (10/08)  Nephrology consulted-- no RRT recommended at this time  Renal US (10/11): no hydronephrosis  Echo 10/12: diastolic dysfunction, likely 2/2 to ARF  - pt on renal diet w 1500 ml FR.        Hypervolemia associated with renal insufficiency    - Diuresed w/ Lasix 100mg x 1 (10/12).  - started Lasix 80 mg bid per Nephrology 10/12.  - CRRT 10/14.  .    -Diuresing w/ Lasix 60 BID          EVA (acute kidney injury)              Metabolic acidosis    - Started Na bicarb 1300 QID.  -CRRT 10/14          At risk for opportunistic infections    - Valcyte for CMV prophylaxis --> will start at discharge or  POD #10 in hospital  - Bactrim for PCP prophylaxis (MWF)  - Fluconazole for fungal prophylaxis           Long-term use of immunosuppressant medication    -  Cellcept/Prograf/pred  -  Held prograf 10/8 for elevated levels  -  Resumed Prograf 10/10/19 at dose of 0.5 mg.  Will adjust as needed.   -  Will monitor for signs of toxic side effects, check daily tacrolimus troughs, and change meds accordingly.          Prophylactic immunotherapy    - See long term use immunosuppresion          Ascites    - paracentesis 10/1, 5L removed, no fluid sent for analysis.  - diuresing with albumin and lasix 10/9.  - Per Nephrology, on 10/13- Lasix 80 mg bid and monitor.  No need for RRT.  -  10/14, pt w worsening edema.  , .  Nephr plan for CRRT this evening.           Weakness    - see physical decond.          Physical deconditioning    - PT/OT consulted.  - Recommend home health PT and rolling walker at discharge           Hyponatremia    - Na 119 on admit.  -Na was improving post transplant w HD.  - Pt POD #9 w EVA, Na worse today at 125.  Changed FR to 1L.  - Na stable post CRRT  - Will cont to monitor                  Hypotension    -  continue Midodrine 15 mg TID.   Monitor.          Anemia requiring transfusions    - pt w decreased H/H.  Transfuse 1 u PRBC 10/14 and 10/16  - pt c/o right side pain and dark stools  -Sending for FOBT  -cont to monitor           Severe malnutrition    - dietary consulted.   - temporal and distal extremity muscle wasting and edema  - encourage supplements and to increase nutritional intake   - pt on renal/low K+ diet        Type 2 diabetes mellitus with diabetic polyneuropathy, with long-term current use of insulin    - continue home regimen.  - endocrine consulted.  -Pt off insulin drip at this time.  Apprec endo recs.           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  -Tx w/ Entecavir- dose changed to q72 hours given renal function.                VTE Risk Mitigation (From admission, onward)        Ordered     Place sequential compression device  Until discontinued      10/05/18 0709     heparin (porcine) injection 5,000 Units  Every 8 hours      10/01/18 1617          The patients clinical status was discussed at multidisplinary rounds, involving transplant surgery, transplant medicine, pharmacy, nursing, nutrition, and social work    Discharge Planning:  No Patient Care Coordination Note on file.      Jihan Soares PA-C  Liver Transplant  [facility]

## 2018-10-16 NOTE — ASSESSMENT & PLAN NOTE
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   -POD 1 US: increased arterial resistive indices, suggestive of edema vs cute rejection vs congestion  -Repeat US 10/9 with continued elevation of RIs  -LFTs trending down  -T bili trending down  -repeat liver US (10/11): elevated RIs  - LFTs yet to normalize but are trending down.  - pt c/o right side pain, likely from dependent edema

## 2018-10-16 NOTE — PHYSICIAN QUERY
"PT Name: Scarlett Reddy  MR #: [id number]    Physician Query Form - Hematology Clarification      CDS/: Patsy Sanz               Contact information:[email or web address]  This form is a permanent document in the medical record.      Query Date: October 16, 2018    By submitting this query, we are merely seeking further clarification of documentation. Please utilize your independent clinical judgment when addressing the question(s) below.    The Medical record contains the following:   Indicators  Supporting Clinical Findings Location in Medical Record   x "Anemia" documented Anemia requiring transfusions  Liver transplant note 10-14   x H & H = Hgb=6.9-->9.4  Hct=20.8-->26.4 Lab 10-14 @0648 and 1655    BP =                     HR=      "GI bleeding" documented      Acute bleeding (Non GI site)     x Transfusion(s) 1 u PRBC today  Liver transplant note 10-14    Treatment:     x Other:  Estimated Blood Loss: 500 mL   Ascites Evacuated: 3000 mL    Op note 10-5     Provider, please specify diagnosis or diagnoses associated with above clinical findings.    [ x ] Acute blood loss anemia expected post-operatively  [  ] Acute blood loss anemia  [  ] Other (Specify) _______________________________  [  ] Clinically Undetermined       Please document in your progress notes daily for the duration of treatment, until resolved, and include in your discharge summary.                                                                                                      "

## 2018-10-16 NOTE — ASSESSMENT & PLAN NOTE
- Creatinine on arrival 0.8 and has been up trending   BUN/cr (10/11/18): 91/1.9   Tacrolimus 26.9 (10/08)  Nephrology consulted-- no RRT recommended at this time  Renal US (10/11): no hydronephrosis  Echo 10/12: diastolic dysfunction, likely 2/2 to ARF  - pt on renal diet w 1500 ml FR.

## 2018-10-16 NOTE — PLAN OF CARE
Problem: Physical Therapy Goal  Goal: Physical Therapy Goal  Goals to be met by: 10/22/2018    Patient will increase functional independence with mobility by performin. Supine to sit with Contact Guard Assistance - not met  2. Sit to stand transfer with Supervision using LRAD or no AD - not met  3. Bed to chair transfer with Supervision using LRAD or no AD - not met  4. Gait  x 250 feet with Supervision using LRAD or no AD - not met  5. Stand for 3 minutes with Supervision to increase activity tolerance - not met   6. Lower extremity exercise program x15 reps per handout, with independence - not met      Outcome: Ongoing (interventions implemented as appropriate)  Goals reviewed and remain appropriate. Pt progressing towards goals.    Radha Nguyen, PT, DPT   10/16/2018  [phone]

## 2018-10-16 NOTE — ASSESSMENT & PLAN NOTE
BG goal 140 - 180     Levemir 12 units HS.   Increase Novolog to 11 units with meals.   2 units PRN protein shake  Low dose correction scale insulin  BG monitoring AC/HS      Discharge planning:  TBD

## 2018-10-16 NOTE — PROGRESS NOTES
[facility]  Nephrology  Progress Note    Patient Name: Scarlett Reddy  MRN: [id number]  Admission Date: 10/1/2018  Hospital Length of Stay: 14 days  Attending Provider: Cristobal Marmolejo Jr., MD   Primary Care Physician: Primary Doctor No  Principal Problem:Liver transplanted    Subjective:     HPI: 67 y/o female with ESLD due to Hep B and D cirrhosis MELD of 23 on 10/01/18.  Paracentesis 10/1 with 5L removed, no fluid sent for analysis. 10/02 significant for hyponatremia, Na 119 and she was admitted to LTS for sodium monitoring. On 10/05/18 she is s/p DBDLT (CMV D+/R+, steroid induction, MMF/tacro/pred maintenance) with take back on 10/6 secondary to hyperbilirubinema, no leak identified. Post-op course complicated by hypercapneic and hypoxic respiratory failure and was intubated 10/06 and extubated 10/07. Liver bx (10/6) sig for Zone 3 hemorrhage and collapse, in addition to cholestasis. ID consulted to comment on positive diphtheroid culture from ascitic fluid (likely skin colonization) as well as ESBL Klebsiella pneumoniae in urine culture (10/4).  Pt asymptomatic and cell count from ascitic fluid unremarkable. Mild peritransplant ascites- repeat US 10/6 with increased arterial resistive indices. Repeat US 10/9 w/ continued elevation in RIs and fluid collections. LFTs trending down nicely.     Patient is currently on entecavir 0.5 mg, Hepagam, mycophenalate 1000 mg BID, prednisone 20 mg and prophylaxis with Bactrim, fluconazole and Valgancyclovir.    Nephrology consulted for acute kidney failure is setting of Orthotopic Liver Transplant. Cr 0.8 on 10/05, started to trend up and 1.5 (10/09), 1.8 (10/10), and 1.9 (10/11). UOP 10/10/18: 520 ml. UA (10/09): 1+ occult blood and 1+ protein with RBC and amorphous casts.      Interval History (10/16.18): Patient is verbal and communicative. Continues to complaint of bilateral peripheral edema up to hip region. Also reports black stools for the past 3-4  days, abdominal distention and fatigue. Denies chest pain, dyspnea, N/V, diarrhea or constipation.    Review of patient's allergies indicates:  No Known Allergies  Current Facility-Administered Medications   Medication Frequency    bisacodyl EC tablet 10 mg QHS    bisacodyl suppository 10 mg Daily PRN    calcium carbonate 200 mg calcium (500 mg) chewable tablet 500 mg TID PRN    dextrose 50% injection 12.5 g PRN    dextrose 50% injection 25 g PRN    docusate sodium capsule 50 mg TID    entecavir tablet 0.5 mg Q72H    ergocalciferol capsule 50,000 Units Q7 Days    fluconazole tablet 200 mg Daily    glucagon (human recombinant) injection 1 mg PRN    glucose chewable tablet 16 g PRN    glucose chewable tablet 24 g PRN    heparin (porcine) injection 5,000 Units Q8H    insulin aspart U-100 pen 0-5 Units QID (AC + HS) PRN    insulin aspart U-100 pen 11 Units TIDWM    insulin aspart U-100 pen 2 Units PRN    insulin detemir U-100 pen 12 Units QHS    midodrine tablet 15 mg TID    mirtazapine tablet 7.5 mg QHS    mycophenolate capsule 1,000 mg BID    ondansetron disintegrating tablet 8 mg Q8H PRN    oxyCODONE immediate release tablet 5 mg Q4H PRN    oxyCODONE immediate release tablet Tab 10 mg Q4H PRN    pantoprazole EC tablet 40 mg Daily    polyethylene glycol packet 17 g Daily PRN    predniSONE tablet 20 mg Daily    promethazine (PHENERGAN) 6.25 mg in dextrose 5 % 50 mL IVPB Q6H PRN    sodium bicarbonate tablet 1,300 mg QID    sodium chloride 0.9% flush 3 mL PRN    sulfamethoxazole-trimethoprim 400-80mg per tablet 1 tablet Every Mon, Wed, Fri    tacrolimus capsule 0.5 mg BID    ursodiol capsule 300 mg BID    [START ON 10/18/2018] valGANciclovir tablet 450 mg Twice Weekly       Objective:     Vital Signs (Most Recent):  Temp: 97.5 °F (36.4 °C) (10/16/18 1155)  Pulse: 86 (10/16/18 1200)  Resp: 15 (10/16/18 1155)  BP: 103/68 (10/16/18 1155)  SpO2: 99 % (10/16/18 1155)  O2 Device (Oxygen  Therapy): room air (10/16/18 1155) Vital Signs (24h Range):  Temp:  [97 °F (36.1 °C)-98.1 °F (36.7 °C)] 97.5 °F (36.4 °C)  Pulse:  [] 86  Resp:  [10-15] 15  SpO2:  [97 %-100 %] 99 %  BP: ()/(56-75) 103/68     Weight: 70 kg (154 lb 4.8 oz) (10/16/18 0416)  Body mass index is 27.33 kg/m².  Body surface area is 1.76 meters squared.    I/O last 3 completed shifts:  In: 2543.3 [P.O.:1200; I.V.:1343.3]  Out: 2374 [Urine:1150; Other:1224]    Physical Exam  Constitutional: She is oriented to person, place, and time. She appears well-developed.   Temporal and distal extremity muscle wasting; yellowing of skin  Eyes: EOM are normal. Pupils are equal, round, and reactive to light. Scleral icterus is present.   Neck: Normal range of motion.   Cardiovascular: Normal rate and regular rhythm.   No murmur heard.  Pulmonary/Chest: Effort normal and breath sounds normal. No respiratory distress. She has no wheezes.   Abdominal: Soft. Bowel sounds are normal. She exhibits distension. There is no tenderness.   Chevron incision w/ staples, CDI  Musculoskeletal: Normal range of motion. She exhibits edema (BL lower extremities).   Neurological: She is alert and oriented to person, place, and time.   Skin: Skin is warm and dry.   Nursing note and vitals reviewed.    Significant Labs:  CBC:   Recent Labs   Lab  10/16/18   0601  10/16/18   0728   WBC  10.10   --    RBC  2.35*   --    HGB  7.2*  6.9*   HCT  20.4*  20.4*   PLT  78*   --    MCV  87   --    MCH  30.6   --    MCHC  35.3   --      CMP:   Recent Labs   Lab  10/16/18   0601   GLU  165*   CALCIUM  8.2*   ALBUMIN  2.5*   PROT  3.9*   NA  130*   K  3.7   CO2  25   CL  91*   BUN  114*   CREATININE  2.2*   ALKPHOS  139*   ALT  48*   AST  30   BILITOT  6.2*     Recent Labs   Lab  10/15/18   1416   COLORU  Stacy   SPECGRAV  1.015   PHUR  5.0   PROTEINUA  Negative   NITRITE  Negative   LEUKOCYTESUR  Negative   UROBILINOGEN  Negative     All labs within the past 24 hours have  been reviewed.     Significant Imaging:  US Liver Transplant (10/15):   Impression         Slight interval improvement in the left hepatic artery resistive indices with persistent stable elevation of the remaining hepatic resistive indices.  Findings are favored to be secondary to liver edema.  Reducing peritransplant fluid collections.  Mild volume ascites.  Partially visualized right pleural effusion.      10/11/2018 US Retroperitoneal:   Impression         Upper normal to mildly elevated renal resistive indices suggestive of medical renal disease.  Ascites.     Assessment/Plan:      Post LT - Acute renal failure    Post-LT EVA is multifactorial in origin likely related to the severity of liver disease, toxicity of immunosuppressive therapy and hepatic ischaemia reperfusion injury      - acute gradual worsening of renal function s/p OLT  - 10/06 was taken to the OR for possibility of billiary leak and post op complication due to hypercapnic hypoxic respiratory failure, no blood pressure drop intraoperatively per anesthesiology notes. Patient has not been needing vasopressors to maintain BP  - Creatinine on arrival 0.8 and has been up trending   - BUN/cr (10/11/18): 91/1.9; (10/12/18): 100/2.0  - Tacrolimus 26.9 (10/08); 6.8 (10/12)  - Protein Creatine ratio: 0.82  - UA consistent with hematuria, proteinuria, RBC and amorphous casts   - Fena 0.5%; consistent with hypovolemia   - Renal US negative for obstruction, stricture or, hydronephrosis  - (10/16/18): BUN/Cr: 114/2.2. Creatinine is continuing to rise;  cc; net + 155cc; consider increasing lasix to 80 mg BID  -  however, BUN elevated disproportionately. Patient has been transfused 2 pRBC on (10/14) and Hb today 6.9. Transfused 1 pRBC (10/16). Possible concern of GI bleed in setting of post transplant, and complaints of black stool.     Assessment: Acute Renal failure in setting of hepatic ischemia reperfusion injury and hypovolemia in setting of acute GI  bleed. acute tubular necrosis from immunosuppressive agents      Plan:  -  Diureses w/ Lasix 80 mg BID daily.   - Continue fluid restriction intake max 1L/24 hours  - avoid nephrotoxic drugs, ACEI/ARB, NSAIDS  - adjust drugs to GFR: bactrim three times a week  - Acute RRT not indicated at this point  - will reassess in am with repeat RFT's and electrolytes; and response to diuretics  - Strict I and O's; monitor UOP in response to lasix                     Acute blood loss anemia  BUN/Cr: 114/2.2. Creatinine is continuing to rise; however, BUN elevated disproportionately. Patient has been transfused 2 pRBC on (10/14) and Hb today 6.9. Transfused 1 pRBC (10/16). Possible concern of GI bleed in setting of post transplant, and complaints of black stool.  - check FOBT  - appreciate GI consult for endoscopy    Thank you for your consult. I will follow-up with patient. Please contact us if you have any additional questions.     Darrel Adhikari MD   Internal Medicine PGY II  Pager: [phone]  Nephrology  [facility]

## 2018-10-16 NOTE — ASSESSMENT & PLAN NOTE
Managed per primary team  Avoid hypoglycemia    Recent Labs   Lab  10/16/18   0601   ALT  48*   AST  30   ALKPHOS  139*   BILITOT  6.2*   PROT  3.9*   ALBUMIN  2.5*

## 2018-10-16 NOTE — SUBJECTIVE & OBJECTIVE
"Interval HPI:   Overnight events: Remains in TSU. BG slightly above goal this am, elevated yesterday during the day.  Prednisone 20 mg daily; standard steroid taper.  Eatin% - admits to previous snacking, grazing throughout the day to supplement nutrition.  Teaching provided by RN at bedside to consume food during meal times.  Nausea: No  Hypoglycemia and intervention: No  Fever: No  TPN and/or TF: No    /70   Pulse 97   Temp 97.1 °F (36.2 °C) (Axillary)   Resp 13   Ht 5' 3" (1.6 m)   Wt 70 kg (154 lb 4.8 oz)   LMP  (LMP Unknown)   SpO2 98%   Breastfeeding? No   BMI 27.33 kg/m²     Labs Reviewed and Include    Recent Labs   Lab  10/16/18   0601   GLU  165*   CALCIUM  8.2*   ALBUMIN  2.5*   PROT  3.9*   NA  130*   K  3.7   CO2  25   CL  91*   BUN  114*   CREATININE  2.2*   ALKPHOS  139*   ALT  48*   AST  30   BILITOT  6.2*     Lab Results   Component Value Date    WBC 10.10 10/16/2018    HGB 6.9 (L) 10/16/2018    HCT 20.4 (L) 10/16/2018    MCV 87 10/16/2018    PLT 78 (L) 10/16/2018     No results for input(s): TSH, FREET4 in the last 168 hours.  Lab Results   Component Value Date    HGBA1C 6.0 (H) 10/04/2018       Nutritional status:   Body mass index is 27.33 kg/m².  Lab Results   Component Value Date    ALBUMIN 2.5 (L) 10/16/2018    ALBUMIN 2.3 (L) 10/15/2018    ALBUMIN 2.3 (L) 10/15/2018    ALBUMIN 2.3 (L) 10/15/2018     Lab Results   Component Value Date    PREALBUMIN 12 (L) 2018       Estimated Creatinine Clearance: 22.6 mL/min (A) (based on SCr of 2.2 mg/dL (H)).    Accu-Checks  Recent Labs      10/14/18   0308  10/14/18   0826  10/14/18   1307  10/14/18   1840  10/14/18   2053  10/15/18   0839  10/15/18   1410  10/15/18   1816  10/15/18   2021  10/16/18   0733   POCTGLUCOSE  179*  85  172*  203*  220*  126*  240*  334*  278*  189*       Current Medications and/or Treatments Impacting Glycemic Control  Immunotherapy:    Immunosuppressants         Stop Route Frequency     tacrolimus " capsule 0.5 mg      -- Oral Daily     mycophenolate capsule 1,000 mg      -- Oral 2 times daily        Steroids:   Hormones (From admission, onward)    Start     Stop Route Frequency Ordered    10/11/18 0900  predniSONE tablet 20 mg      10/18 0859 Oral Daily 10/10/18 0829        Pressors:    Autonomic Drugs (From admission, onward)    Start     Stop Route Frequency Ordered    10/10/18 1500  midodrine tablet 15 mg      -- Oral 3 times daily 10/10/18 1122    10/06/18 1617  rocuronium (ZEMURON) 10 mg/mL injection     Comments:  Created by cabinet override    10/07 0429   10/06/18 1617        Hyperglycemia/Diabetes Medications:   Antihyperglycemics (From admission, onward)    Start     Stop Route Frequency Ordered    10/15/18 2100  insulin detemir U-100 pen 12 Units      -- SubQ Nightly 10/15/18 1533    10/15/18 1819  insulin aspart U-100 pen 2 Units      -- SubQ As needed (PRN) 10/15/18 1720    10/15/18 1730  insulin aspart U-100 pen 9 Units      -- SubQ 3 times daily with meals 10/15/18 1719    10/14/18 0925  insulin aspart U-100 pen 0-5 Units      -- SubQ Before meals & nightly PRN 10/14/18 0825    10/07/18 1200  insulin regular (Humulin R) 100 Units in sodium chloride 0.9% 100 mL infusion      10/13 2100 IV Continuous 10/07/18 1055

## 2018-10-16 NOTE — PROGRESS NOTES
"Patient with complaint of having "hard time breathing". vss: O2 98% on RA. /68, HR 86. Notified GREGORIO Vasquez. Will continue to monitor.   "

## 2018-10-16 NOTE — ASSESSMENT & PLAN NOTE
- pt w decreased H/H.  Transfuse 1 u PRBC 10/14 and 10/16  - pt c/o right side pain and dark stools  -Sending for FOBT  -cont to monitor

## 2018-10-16 NOTE — ASSESSMENT & PLAN NOTE
Patient has been transfused 1 pRBC on (10/14)  Hb today 6.9. Transfused 1 pRBC (10/16).   Possible concern of GI bleed in setting of post transplant, and complaints of black stool.  - check FOBT

## 2018-10-16 NOTE — PLAN OF CARE
Problem: Patient Care Overview  Goal: Plan of Care Review  Outcome: Ongoing (interventions implemented as appropriate)  Patient aaox4. Bed kept locked, lowest position with 2x side rails up while in bed. nonslip footwear when out of bed. Ambulates with 1x assistance, ambulation encouraged. Worked with PT/OT today.  Up in chair for most of shift. Call bell and personal items within reach. Family at bedside, attentive to patient's needs.  used for language. Diabetic diet with 1000ml fluid restriction. accuchecks ac/hs, insulin given as ordered, eating encouraged. +3 bilateral lower extremity edema.  Right IJ cdi.   Chevron dedrick with staples, painted with betadine.  Abdomen distended.  h/h 6.9/20.4 this morning, given 1unit PRBCs, repeat this afternoon h/h8.8/25.5. Concern for possible GIB, awaiting stool sample for occult blood. Contact precautions maintained.

## 2018-10-16 NOTE — ASSESSMENT & PLAN NOTE
- Diuresed w/ Lasix 100mg x 1 (10/12).  - started Lasix 80 mg bid per Nephrology 10/12.  - CRRT 10/14.  .    -Diuresing w/ Lasix 60 BID

## 2018-10-16 NOTE — PLAN OF CARE
Problem: Patient Care Overview  Goal: Plan of Care Review  Outcome: Ongoing (interventions implemented as appropriate)  Pt aao x 4. VSS. Bed in low locked p[os, call light within reach. Pt calls for assistance when needed. Pt Chevron dedrick w/ staples, painted w/ betadine. No c/o pain. Pt measures uop, 500 cc. Bs monitored ac/hs, scheduled insulin given as ordered.

## 2018-10-16 NOTE — PROGRESS NOTES
"[facility]  Endocrinology  Progress Note    Admit Date: 10/1/2018     Reason for Consult: Management of type 2 DM, Hyperglycemia      Surgical Procedure and Date: Liver transplant 10/5/18, Ex lap 10/6/18     Diabetes diagnosis year:      Home Diabetes Medications:  Lantus 18 units HS and novolog 17 units with meals plus correction scale (150-200 +1)        Lab Results   Component Value Date     HGBA1C 6.8 (H) 2018        How often checking glucose at home? 3-4   BG readings on regimen: 120-150.  Post prandial readings as high as upper 200s  Hypoglycemia on the regimen? yes, none recently   Missed doses on regimen?  No     Diabetes Complications include:     Diabetic peripheral neuropathy      Complicating diabetes co morbidities:   CIRRHOSIS        HPI:  Patient is a 68 y.o. female with a diagnosis of ESLD, listed for liver transplant with meld 23 who underwent live transplant on 10/5/18.  Back to OR on 10/6/18 for ex lap.  Admitted 10/1/18 for hyponatremia s/p paracentesis. She speaks primarily Icelandic. Information obtained from recent past admission and discussion over the phone via  ("[employer] Family not at bedside. Pt request family at bedside for future conversations. Personal has known diagnosis of diabetes, endocrine consulted for diabetes management.        Interval HPI:   Overnight events: Remains in TSU. BG slightly above goal this am, elevated yesterday during the day.  Prednisone 20 mg daily; standard steroid taper.  Eatin% - admits to previous snacking, grazing throughout the day to supplement nutrition.  Teaching provided by RN at bedside to consume food during meal times.  Nausea: No  Hypoglycemia and intervention: No  Fever: No  TPN and/or TF: No    /70   Pulse 97   Temp 97.1 °F (36.2 °C) (Axillary)   Resp 13   Ht 5' 3" (1.6 m)   Wt 70 kg (154 lb 4.8 oz)   LMP  (LMP Unknown)   SpO2 98%   Breastfeeding? No   BMI 27.33 kg/m²      Labs " Reviewed and Include    Recent Labs   Lab  10/16/18   0601   GLU  165*   CALCIUM  8.2*   ALBUMIN  2.5*   PROT  3.9*   NA  130*   K  3.7   CO2  25   CL  91*   BUN  114*   CREATININE  2.2*   ALKPHOS  139*   ALT  48*   AST  30   BILITOT  6.2*     Lab Results   Component Value Date    WBC 10.10 10/16/2018    HGB 6.9 (L) 10/16/2018    HCT 20.4 (L) 10/16/2018    MCV 87 10/16/2018    PLT 78 (L) 10/16/2018     No results for input(s): TSH, FREET4 in the last 168 hours.  Lab Results   Component Value Date    HGBA1C 6.0 (H) 10/04/2018       Nutritional status:   Body mass index is 27.33 kg/m².  Lab Results   Component Value Date    ALBUMIN 2.5 (L) 10/16/2018    ALBUMIN 2.3 (L) 10/15/2018    ALBUMIN 2.3 (L) 10/15/2018    ALBUMIN 2.3 (L) 10/15/2018     Lab Results   Component Value Date    PREALBUMIN 12 (L) 09/17/2018       Estimated Creatinine Clearance: 22.6 mL/min (A) (based on SCr of 2.2 mg/dL (H)).    Accu-Checks  Recent Labs      10/14/18   0308  10/14/18   0826  10/14/18   1307  10/14/18   1840  10/14/18   2053  10/15/18   0839  10/15/18   1410  10/15/18   1816  10/15/18   2021  10/16/18   0733   POCTGLUCOSE  179*  85  172*  203*  220*  126*  240*  334*  278*  189*       Current Medications and/or Treatments Impacting Glycemic Control  Immunotherapy:    Immunosuppressants         Stop Route Frequency     tacrolimus capsule 0.5 mg      -- Oral Daily     mycophenolate capsule 1,000 mg      -- Oral 2 times daily        Steroids:   Hormones (From admission, onward)    Start     Stop Route Frequency Ordered    10/11/18 0900  predniSONE tablet 20 mg      10/18 0859 Oral Daily 10/10/18 0829        Pressors:    Autonomic Drugs (From admission, onward)    Start     Stop Route Frequency Ordered    10/10/18 1500  midodrine tablet 15 mg      -- Oral 3 times daily 10/10/18 1122    10/06/18 1617  rocuronium (ZEMURON) 10 mg/mL injection     Comments:  Created by cabinet override    10/07 0429   10/06/18 1616         Hyperglycemia/Diabetes Medications:   Antihyperglycemics (From admission, onward)    Start     Stop Route Frequency Ordered    10/15/18 2100  insulin detemir U-100 pen 12 Units      -- SubQ Nightly 10/15/18 1533    10/15/18 1819  insulin aspart U-100 pen 2 Units      -- SubQ As needed (PRN) 10/15/18 1720    10/15/18 1730  insulin aspart U-100 pen 9 Units      -- SubQ 3 times daily with meals 10/15/18 1719    10/14/18 0925  insulin aspart U-100 pen 0-5 Units      -- SubQ Before meals & nightly PRN 10/14/18 0825    10/07/18 1200  insulin regular (Humulin R) 100 Units in sodium chloride 0.9% 100 mL infusion      10/13 2100 IV Continuous 10/07/18 1055          ASSESSMENT and PLAN    * Liver transplanted    Managed per primary team  Avoid hypoglycemia    Recent Labs   Lab  10/16/18   0601   ALT  48*   AST  30   ALKPHOS  139*   BILITOT  6.2*   PROT  3.9*   ALBUMIN  2.5*               Type 2 diabetes mellitus with diabetic polyneuropathy, with long-term current use of insulin    BG goal 140 - 180     Levemir 12 units HS.   Increase Novolog to 11 units with meals.   2 units PRN protein shake  Low dose correction scale insulin  BG monitoring AC/HS      Discharge planning:  TBD        Severe malnutrition    Oral intake encouraged, may affect BG readings        Corticosteroids adverse reaction    On standard steroid taper per transplant team; may elevate BG readings            Prophylactic immunotherapy    May increase insulin resistance.               Be Willams NP  Endocrinology  [facility]

## 2018-10-16 NOTE — SUBJECTIVE & OBJECTIVE
Scheduled Meds:   bisacodyl  10 mg Oral QHS    docusate sodium  50 mg Oral TID    entecavir  0.5 mg Oral Q72H    ergocalciferol  50,000 Units Oral Q7 Days    fluconazole  200 mg Oral Daily    heparin (porcine)  5,000 Units Subcutaneous Q8H    insulin aspart U-100  11 Units Subcutaneous TIDWM    insulin detemir U-100  12 Units Subcutaneous QHS    midodrine  15 mg Oral TID    mirtazapine  7.5 mg Oral QHS    mycophenolate  1,000 mg Oral BID    pantoprazole  40 mg Oral Daily    predniSONE  20 mg Oral Daily    sodium bicarbonate  1,300 mg Oral QID    sulfamethoxazole-trimethoprim 400-80mg  1 tablet Oral Every Mon, Wed, Fri    tacrolimus  0.5 mg Oral BID    ursodiol  300 mg Oral BID    [START ON 10/18/2018] valGANciclovir  450 mg Oral Twice Weekly     Continuous Infusions:  PRN Meds:bisacodyl, calcium carbonate, dextrose 50%, dextrose 50%, glucagon (human recombinant), glucose, glucose, insulin aspart U-100, insulin aspart U-100, ondansetron, oxyCODONE, oxyCODONE, polyethylene glycol, promethazine (PHENERGAN) IVPB, sodium chloride 0.9%    Review of Systems   Constitutional: Positive for activity change, appetite change (decreased) and fatigue. Negative for fever.   Respiratory: Negative for cough and shortness of breath.    Cardiovascular: Positive for leg swelling. Negative for chest pain.   Gastrointestinal: Positive for abdominal distention. Negative for abdominal pain, constipation, diarrhea, nausea and vomiting.   Genitourinary: Negative for decreased urine volume, difficulty urinating and dysuria.   Musculoskeletal: Negative for arthralgias and back pain.   Allergic/Immunologic: Positive for immunocompromised state.   Neurological: Positive for weakness. Negative for tremors.     Objective:     Vital Signs (Most Recent):  Temp: 97.5 °F (36.4 °C) (10/16/18 1155)  Pulse: 86 (10/16/18 1200)  Resp: 15 (10/16/18 1155)  BP: 103/68 (10/16/18 1155)  SpO2: 99 % (10/16/18 1155) Vital Signs (24h  Range):  Temp:  [97 °F (36.1 °C)-98.1 °F (36.7 °C)] 97.5 °F (36.4 °C)  Pulse:  [] 86  Resp:  [10-15] 15  SpO2:  [97 %-100 %] 99 %  BP: ()/(56-75) 103/68     Weight: 70 kg (154 lb 4.8 oz)  Body mass index is 27.33 kg/m².    Intake/Output - Last 3 Shifts       10/14 0700 - 10/15 0659 10/15 0700 - 10/16 0659 10/16 0700 - 10/17 0659    P.O. 620 1080     I.V. (mL/kg) 1343.3 (20.3)      Blood 219  250    Total Intake(mL/kg) 2182.3 (32.9) 1080 (15.4) 250 (3.6)    Urine (mL/kg/hr) 695 (0.4) 925 (0.6) 400 (0.7)    Other 1224      Stool 0 0     Total Output 1919 925 400    Net +263.3 +155 -150           Stool Occurrence 1 x 0 x           Physical Exam   Constitutional: She is oriented to person, place, and time.   Temporal and distal extremity muscle wasting   Eyes: EOM are normal. Pupils are equal, round, and reactive to light. Scleral icterus is present.   Neck: Normal range of motion.   Cardiovascular: Normal rate and regular rhythm.   No murmur heard.  Pulmonary/Chest: Effort normal and breath sounds normal. No respiratory distress. She has no wheezes.   Abdominal: Soft. Bowel sounds are normal. She exhibits distension. There is no tenderness.   Chevron incision w/ staples, CDI   Genitourinary:   Genitourinary Comments: No blood noted on MORELIA.    Musculoskeletal: Normal range of motion.   Neurological: She is alert and oriented to person, place, and time.   Skin: Skin is warm and dry.   Nursing note and vitals reviewed.      Laboratory:  Immunosuppressants         Stop Route Frequency     tacrolimus capsule 0.5 mg      -- Oral 2 times daily     mycophenolate capsule 1,000 mg      -- Oral 2 times daily        CBC:   Recent Labs   Lab  10/16/18   1442   WBC  16.72*   RBC  2.91*   HGB  8.8*   HCT  25.5*   PLT  69*   MCV  88   MCH  30.2   MCHC  34.5     CMP:   Recent Labs   Lab  10/16/18   0601   GLU  165*   CALCIUM  8.2*   ALBUMIN  2.5*   PROT  3.9*   NA  130*   K  3.7   CO2  25   CL  91*   BUN  114*   CREATININE   2.2*   ALKPHOS  139*   ALT  48*   AST  30   BILITOT  6.2*     Labs within the past 24 hours have been reviewed.    Diagnostic Results:  I have personally reviewed all pertinent imaging studies.

## 2018-10-17 ENCOUNTER — ANESTHESIA EVENT (OUTPATIENT)
Dept: ENDOSCOPY | Facility: HOSPITAL | Age: 68
DRG: 005 | End: 2018-10-17
Payer: COMMERCIAL

## 2018-10-17 PROBLEM — N39.0 UTI (URINARY TRACT INFECTION): Status: ACTIVE | Noted: 2018-10-17

## 2018-10-17 LAB
ALBUMIN SERPL BCP-MCNC: 2.3 G/DL
ALBUMIN SERPL BCP-MCNC: 2.4 G/DL
ALP SERPL-CCNC: 151 U/L
ALT SERPL W/O P-5'-P-CCNC: 46 U/L
ANION GAP SERPL CALC-SCNC: 13 MMOL/L
ANION GAP SERPL CALC-SCNC: 14 MMOL/L
APTT BLDCRRT: 22.6 SEC
AST SERPL-CCNC: 31 U/L
BASOPHILS # BLD AUTO: 0.01 K/UL
BASOPHILS NFR BLD: 0.1 %
BILIRUB SERPL-MCNC: 5.3 MG/DL
BUN SERPL-MCNC: 115 MG/DL
BUN SERPL-MCNC: 120 MG/DL
CALCIUM SERPL-MCNC: 7.9 MG/DL
CALCIUM SERPL-MCNC: 8.2 MG/DL
CHLORIDE SERPL-SCNC: 90 MMOL/L
CHLORIDE SERPL-SCNC: 93 MMOL/L
CO2 SERPL-SCNC: 24 MMOL/L
CO2 SERPL-SCNC: 25 MMOL/L
CREAT SERPL-MCNC: 2.3 MG/DL
CREAT SERPL-MCNC: 2.5 MG/DL
DIFFERENTIAL METHOD: ABNORMAL
EOSINOPHIL # BLD AUTO: 0 K/UL
EOSINOPHIL NFR BLD: 0.2 %
ERYTHROCYTE [DISTWIDTH] IN BLOOD BY AUTOMATED COUNT: 16.1 %
EST. GFR  (AFRICAN AMERICAN): 21.9 ML/MIN/1.73 M^2
EST. GFR  (AFRICAN AMERICAN): 24.3 ML/MIN/1.73 M^2
EST. GFR  (NON AFRICAN AMERICAN): 19 ML/MIN/1.73 M^2
EST. GFR  (NON AFRICAN AMERICAN): 21.1 ML/MIN/1.73 M^2
FERRITIN SERPL-MCNC: 1286 NG/ML
FIBRINOGEN PPP-MCNC: 202 MG/DL
FOLATE SERPL-MCNC: 5.8 NG/ML
GLUCOSE SERPL-MCNC: 181 MG/DL
GLUCOSE SERPL-MCNC: 205 MG/DL
HAPTOGLOB SERPL-MCNC: <10 MG/DL
HCT VFR BLD AUTO: 24 %
HGB BLD-MCNC: 8 G/DL
IMM GRANULOCYTES # BLD AUTO: 0.11 K/UL
IMM GRANULOCYTES NFR BLD AUTO: 0.9 %
INR PPP: 1
IRON SERPL-MCNC: 190 UG/DL
LDH SERPL L TO P-CCNC: 278 U/L
LYMPHOCYTES # BLD AUTO: 0.3 K/UL
LYMPHOCYTES NFR BLD: 2.7 %
MAGNESIUM SERPL-MCNC: 1.6 MG/DL
MCH RBC QN AUTO: 29.5 PG
MCHC RBC AUTO-ENTMCNC: 33.3 G/DL
MCV RBC AUTO: 89 FL
MONOCYTES # BLD AUTO: 0.6 K/UL
MONOCYTES NFR BLD: 4.7 %
NEUTROPHILS # BLD AUTO: 11.2 K/UL
NEUTROPHILS NFR BLD: 91.4 %
NRBC BLD-RTO: 0 /100 WBC
OB PNL STL: POSITIVE
PHOSPHATE SERPL-MCNC: 3.3 MG/DL
PHOSPHATE SERPL-MCNC: 3.9 MG/DL
PLATELET # BLD AUTO: 81 K/UL
PMV BLD AUTO: 12.3 FL
POCT GLUCOSE: 152 MG/DL (ref 70–110)
POCT GLUCOSE: 222 MG/DL (ref 70–110)
POCT GLUCOSE: 238 MG/DL (ref 70–110)
POCT GLUCOSE: 247 MG/DL (ref 70–110)
POTASSIUM SERPL-SCNC: 3.5 MMOL/L
POTASSIUM SERPL-SCNC: 3.5 MMOL/L
PROT SERPL-MCNC: 3.9 G/DL
PROTHROMBIN TIME: 10.6 SEC
RBC # BLD AUTO: 2.71 M/UL
RETICS/RBC NFR AUTO: 3.3 %
SATURATED IRON: 88 %
SODIUM SERPL-SCNC: 129 MMOL/L
SODIUM SERPL-SCNC: 130 MMOL/L
TACROLIMUS BLD-MCNC: 4.3 NG/ML
TOTAL IRON BINDING CAPACITY: 216 UG/DL
TRANSFERRIN SERPL-MCNC: 146 MG/DL
VIT B12 SERPL-MCNC: 584 PG/ML
WBC # BLD AUTO: 12.24 K/UL

## 2018-10-17 PROCEDURE — 82728 ASSAY OF FERRITIN: CPT

## 2018-10-17 PROCEDURE — 85730 THROMBOPLASTIN TIME PARTIAL: CPT

## 2018-10-17 PROCEDURE — 85045 AUTOMATED RETICULOCYTE COUNT: CPT

## 2018-10-17 PROCEDURE — 84100 ASSAY OF PHOSPHORUS: CPT

## 2018-10-17 PROCEDURE — 99900035 HC TECH TIME PER 15 MIN (STAT)

## 2018-10-17 PROCEDURE — 83540 ASSAY OF IRON: CPT

## 2018-10-17 PROCEDURE — 63600175 PHARM REV CODE 636 W HCPCS: Performed by: NURSE PRACTITIONER

## 2018-10-17 PROCEDURE — 83010 ASSAY OF HAPTOGLOBIN QUANT: CPT

## 2018-10-17 PROCEDURE — 99233 SBSQ HOSP IP/OBS HIGH 50: CPT | Mod: 24,,, | Performed by: PHYSICIAN ASSISTANT

## 2018-10-17 PROCEDURE — 83615 LACTATE (LD) (LDH) ENZYME: CPT

## 2018-10-17 PROCEDURE — 25000003 PHARM REV CODE 250: Performed by: TRANSPLANT SURGERY

## 2018-10-17 PROCEDURE — C9113 INJ PANTOPRAZOLE SODIUM, VIA: HCPCS | Performed by: PHYSICIAN ASSISTANT

## 2018-10-17 PROCEDURE — 25000003 PHARM REV CODE 250: Performed by: PHYSICIAN ASSISTANT

## 2018-10-17 PROCEDURE — 99233 SBSQ HOSP IP/OBS HIGH 50: CPT | Mod: ,,, | Performed by: INTERNAL MEDICINE

## 2018-10-17 PROCEDURE — 90945 DIALYSIS ONE EVALUATION: CPT

## 2018-10-17 PROCEDURE — 80197 ASSAY OF TACROLIMUS: CPT

## 2018-10-17 PROCEDURE — 82746 ASSAY OF FOLIC ACID SERUM: CPT

## 2018-10-17 PROCEDURE — 20600001 HC STEP DOWN PRIVATE ROOM

## 2018-10-17 PROCEDURE — 63600175 PHARM REV CODE 636 W HCPCS: Performed by: PHYSICIAN ASSISTANT

## 2018-10-17 PROCEDURE — 83735 ASSAY OF MAGNESIUM: CPT

## 2018-10-17 PROCEDURE — 63600175 PHARM REV CODE 636 W HCPCS: Performed by: STUDENT IN AN ORGANIZED HEALTH CARE EDUCATION/TRAINING PROGRAM

## 2018-10-17 PROCEDURE — 80069 RENAL FUNCTION PANEL: CPT

## 2018-10-17 PROCEDURE — 63600175 PHARM REV CODE 636 W HCPCS: Performed by: SURGERY

## 2018-10-17 PROCEDURE — 85025 COMPLETE CBC W/AUTO DIFF WBC: CPT

## 2018-10-17 PROCEDURE — 36415 COLL VENOUS BLD VENIPUNCTURE: CPT

## 2018-10-17 PROCEDURE — 25000003 PHARM REV CODE 250: Performed by: STUDENT IN AN ORGANIZED HEALTH CARE EDUCATION/TRAINING PROGRAM

## 2018-10-17 PROCEDURE — 99233 SBSQ HOSP IP/OBS HIGH 50: CPT | Mod: ,,, | Performed by: NURSE PRACTITIONER

## 2018-10-17 PROCEDURE — 85610 PROTHROMBIN TIME: CPT

## 2018-10-17 PROCEDURE — 25000003 PHARM REV CODE 250: Performed by: NURSE PRACTITIONER

## 2018-10-17 PROCEDURE — 85384 FIBRINOGEN ACTIVITY: CPT

## 2018-10-17 PROCEDURE — 80053 COMPREHEN METABOLIC PANEL: CPT

## 2018-10-17 PROCEDURE — 82607 VITAMIN B-12: CPT

## 2018-10-17 PROCEDURE — 63600175 PHARM REV CODE 636 W HCPCS: Mod: JG | Performed by: TRANSPLANT SURGERY

## 2018-10-17 PROCEDURE — 25000003 PHARM REV CODE 250: Performed by: INTERNAL MEDICINE

## 2018-10-17 RX ORDER — PREDNISONE 2.5 MG/1
2.5 TABLET ORAL DAILY
Status: DISCONTINUED | OUTPATIENT
Start: 2018-11-07 | End: 2018-10-29

## 2018-10-17 RX ORDER — HEPARIN SODIUM 5000 [USP'U]/ML
5000 INJECTION, SOLUTION INTRAVENOUS; SUBCUTANEOUS EVERY 8 HOURS
Status: COMPLETED | OUTPATIENT
Start: 2018-10-17 | End: 2018-10-17

## 2018-10-17 RX ORDER — INSULIN ASPART 100 [IU]/ML
17 INJECTION, SOLUTION INTRAVENOUS; SUBCUTANEOUS
Status: DISCONTINUED | OUTPATIENT
Start: 2018-10-17 | End: 2018-10-18

## 2018-10-17 RX ORDER — INSULIN ASPART 100 [IU]/ML
17 INJECTION, SOLUTION INTRAVENOUS; SUBCUTANEOUS
Status: DISCONTINUED | OUTPATIENT
Start: 2018-10-17 | End: 2018-10-17

## 2018-10-17 RX ORDER — HYDROCODONE BITARTRATE AND ACETAMINOPHEN 500; 5 MG/1; MG/1
TABLET ORAL CONTINUOUS
Status: ACTIVE | OUTPATIENT
Start: 2018-10-17 | End: 2018-10-18

## 2018-10-17 RX ORDER — MAGNESIUM SULFATE HEPTAHYDRATE 40 MG/ML
2 INJECTION, SOLUTION INTRAVENOUS
Status: ACTIVE | OUTPATIENT
Start: 2018-10-17 | End: 2018-10-18

## 2018-10-17 RX ORDER — CIPROFLOXACIN 500 MG/1
500 TABLET ORAL EVERY 24 HOURS
Status: DISCONTINUED | OUTPATIENT
Start: 2018-10-17 | End: 2018-10-19

## 2018-10-17 RX ORDER — SODIUM BICARBONATE 650 MG/1
1300 TABLET ORAL 2 TIMES DAILY
Status: DISCONTINUED | OUTPATIENT
Start: 2018-10-17 | End: 2018-10-23

## 2018-10-17 RX ORDER — PREDNISONE 5 MG/1
5 TABLET ORAL DAILY
Status: DISCONTINUED | OUTPATIENT
Start: 2018-10-31 | End: 2018-10-29

## 2018-10-17 RX ORDER — PREDNISONE 10 MG/1
10 TABLET ORAL DAILY
Status: DISCONTINUED | OUTPATIENT
Start: 2018-10-24 | End: 2018-10-29

## 2018-10-17 RX ORDER — LORAZEPAM 2 MG/ML
INJECTION INTRAMUSCULAR
Status: DISCONTINUED
Start: 2018-10-17 | End: 2018-10-17 | Stop reason: WASHOUT

## 2018-10-17 RX ADMIN — HEPARIN SODIUM 5000 UNITS: 5000 INJECTION, SOLUTION INTRAVENOUS; SUBCUTANEOUS at 08:10

## 2018-10-17 RX ADMIN — URSODIOL 300 MG: 300 CAPSULE ORAL at 08:10

## 2018-10-17 RX ADMIN — PANTOPRAZOLE SODIUM 40 MG: 40 INJECTION, POWDER, FOR SOLUTION INTRAVENOUS at 08:10

## 2018-10-17 RX ADMIN — SODIUM CHLORIDE: 0.9 INJECTION, SOLUTION INTRAVENOUS at 06:10

## 2018-10-17 RX ADMIN — PREDNISONE 20 MG: 10 TABLET ORAL at 08:10

## 2018-10-17 RX ADMIN — INSULIN ASPART 2 UNITS: 100 INJECTION, SOLUTION INTRAVENOUS; SUBCUTANEOUS at 06:10

## 2018-10-17 RX ADMIN — MYCOPHENOLATE MOFETIL 1000 MG: 250 CAPSULE ORAL at 08:10

## 2018-10-17 RX ADMIN — HEPARIN SODIUM 5000 UNITS: 5000 INJECTION, SOLUTION INTRAVENOUS; SUBCUTANEOUS at 02:10

## 2018-10-17 RX ADMIN — FUROSEMIDE 80 MG: 10 INJECTION, SOLUTION INTRAMUSCULAR; INTRAVENOUS at 10:10

## 2018-10-17 RX ADMIN — MIDODRINE HYDROCHLORIDE 15 MG: 5 TABLET ORAL at 08:10

## 2018-10-17 RX ADMIN — ALTEPLASE 2 MG: 2.2 INJECTION, POWDER, LYOPHILIZED, FOR SOLUTION INTRAVENOUS at 09:10

## 2018-10-17 RX ADMIN — HEPARIN SODIUM 5000 UNITS: 5000 INJECTION, SOLUTION INTRAVENOUS; SUBCUTANEOUS at 06:10

## 2018-10-17 RX ADMIN — MIRTAZAPINE 7.5 MG: 7.5 TABLET ORAL at 08:10

## 2018-10-17 RX ADMIN — TACROLIMUS 0.5 MG: 0.5 CAPSULE ORAL at 06:10

## 2018-10-17 RX ADMIN — SULFAMETHOXAZOLE AND TRIMETHOPRIM 1 TABLET: 400; 80 TABLET ORAL at 08:10

## 2018-10-17 RX ADMIN — SODIUM BICARBONATE 650 MG TABLET 1300 MG: at 08:10

## 2018-10-17 RX ADMIN — PANTOPRAZOLE SODIUM 40 MG: 40 INJECTION, POWDER, FOR SOLUTION INTRAVENOUS at 10:10

## 2018-10-17 RX ADMIN — INSULIN ASPART 17 UNITS: 100 INJECTION, SOLUTION INTRAVENOUS; SUBCUTANEOUS at 02:10

## 2018-10-17 RX ADMIN — TACROLIMUS 0.5 MG: 0.5 CAPSULE ORAL at 08:10

## 2018-10-17 RX ADMIN — INSULIN ASPART 17 UNITS: 100 INJECTION, SOLUTION INTRAVENOUS; SUBCUTANEOUS at 06:10

## 2018-10-17 RX ADMIN — INSULIN ASPART 2 UNITS: 100 INJECTION, SOLUTION INTRAVENOUS; SUBCUTANEOUS at 02:10

## 2018-10-17 RX ADMIN — CIPROFLOXACIN 500 MG: 500 TABLET, FILM COATED ORAL at 12:10

## 2018-10-17 RX ADMIN — INSULIN ASPART 17 UNITS: 100 INJECTION, SOLUTION INTRAVENOUS; SUBCUTANEOUS at 09:10

## 2018-10-17 RX ADMIN — INSULIN ASPART 1 UNITS: 100 INJECTION, SOLUTION INTRAVENOUS; SUBCUTANEOUS at 09:10

## 2018-10-17 RX ADMIN — MIDODRINE HYDROCHLORIDE 15 MG: 5 TABLET ORAL at 02:10

## 2018-10-17 RX ADMIN — FLUCONAZOLE 200 MG: 200 TABLET ORAL at 08:10

## 2018-10-17 RX ADMIN — FUROSEMIDE 80 MG: 10 INJECTION, SOLUTION INTRAMUSCULAR; INTRAVENOUS at 06:10

## 2018-10-17 NOTE — PROGRESS NOTES
[facility]  Nephrology  Progress Note    Patient Name: Scarlett Reddy  MRN: [id number]  Admission Date: 10/1/2018  Hospital Length of Stay: 15 days  Attending Provider: Cristobal Marmolejo Jr., MD   Primary Care Physician: Primary Doctor No  Principal Problem:Liver transplanted    Subjective:     HPI: 69 y/o female with ESLD due to Hep B and D cirrhosis MELD of 23 on 10/01/18.  Paracentesis 10/1 with 5L removed, no fluid sent for analysis. 10/02 significant for hyponatremia, Na 119 and she was admitted to LTS for sodium monitoring. On 10/05/18 she is s/p DBDLT (CMV D+/R+, steroid induction, MMF/tacro/pred maintenance) with take back on 10/6 secondary to hyperbilirubinema, no leak identified. Post-op course complicated by hypercapneic and hypoxic respiratory failure and was intubated 10/06 and extubated 10/07. Liver bx (10/6) sig for Zone 3 hemorrhage and collapse, in addition to cholestasis. ID consulted to comment on positive diphtheroid culture from ascitic fluid (likely skin colonization) as well as ESBL Klebsiella pneumoniae in urine culture (10/4).  Pt asymptomatic and cell count from ascitic fluid unremarkable. Mild peritransplant ascites- repeat US 10/6 with increased arterial resistive indices. Repeat US 10/9 w/ continued elevation in RIs and fluid collections. LFTs trending down nicely.     Patient is currently on entecavir 0.5 mg, Hepagam, mycophenalate 1000 mg BID, prednisone 20 mg and prophylaxis with Bactrim, fluconazole and Valgancyclovir.    Nephrology consulted for acute kidney failure is setting of Orthotopic Liver Transplant. Cr 0.8 on 10/05, started to trend up and 1.5 (10/09), 1.8 (10/10), and 1.9 (10/11). UOP 10/10/18: 520 ml. UA (10/09): 1+ occult blood and 1+ protein with RBC and amorphous casts.      Interval History: Patient is verbal and communicative. Continues to complaint of bilateral peripheral edema up to hip region. Also reports black stools for the past 3-4 days but no  alexy reported today 10/17, has abdominal distention and fatigue. Denies chest pain, dyspnea, N/V, diarrhea or constipation.    Review of patient's allergies indicates:  No Known Allergies  Current Facility-Administered Medications   Medication Frequency    0.9%  NaCl infusion (CRRT USE ONLY) Continuous    bisacodyl EC tablet 10 mg QHS    bisacodyl suppository 10 mg Daily PRN    calcium carbonate 200 mg calcium (500 mg) chewable tablet 500 mg TID PRN    ciprofloxacin HCl tablet 500 mg Daily    dextrose 50% injection 12.5 g PRN    dextrose 50% injection 25 g PRN    docusate sodium capsule 50 mg TID    entecavir tablet 0.5 mg Q72H    ergocalciferol capsule 50,000 Units Q7 Days    fluconazole tablet 200 mg Daily    furosemide injection 80 mg BID    glucagon (human recombinant) injection 1 mg PRN    glucose chewable tablet 16 g PRN    glucose chewable tablet 24 g PRN    heparin (porcine) injection 5,000 Units Q8H    insulin aspart U-100 pen 0-5 Units QID (AC + HS) PRN    insulin aspart U-100 pen 17 Units TIDWM    insulin aspart U-100 pen 2 Units PRN    insulin detemir U-100 pen 13 Units QHS    magnesium sulfate 2g in water 50mL IVPB (premix) PRN    midodrine tablet 15 mg TID    mirtazapine tablet 7.5 mg QHS    mycophenolate capsule 1,000 mg BID    ondansetron disintegrating tablet 8 mg Q8H PRN    oxyCODONE immediate release tablet 5 mg Q4H PRN    oxyCODONE immediate release tablet Tab 10 mg Q4H PRN    pantoprazole injection 40 mg BID    polyethylene glycol packet 17 g Daily PRN    predniSONE tablet 15 mg Daily    Followed by    [START ON 10/24/2018] predniSONE tablet 10 mg Daily    Followed by    [START ON 10/31/2018] predniSONE tablet 5 mg Daily    Followed by    [START ON 11/7/2018] predniSONE tablet 2.5 mg Daily    promethazine (PHENERGAN) 6.25 mg in dextrose 5 % 50 mL IVPB Q6H PRN    sodium bicarbonate tablet 1,300 mg BID    sodium chloride 0.9% flush 3 mL PRN    sodium  phosphate 20.01 mmol in dextrose 5 % 250 mL IVPB PRN    sodium phosphate 30 mmol in dextrose 5 % 250 mL IVPB PRN    sodium phosphate 39.99 mmol in dextrose 5 % 250 mL IVPB PRN    sulfamethoxazole-trimethoprim 400-80mg per tablet 1 tablet Every Mon, Wed, Fri    tacrolimus capsule 0.5 mg BID    ursodiol capsule 300 mg BID    [START ON 10/18/2018] valGANciclovir tablet 450 mg Twice Weekly       Objective:     Vital Signs (Most Recent):  Temp: 99.3 °F (37.4 °C) (10/17/18 1553)  Pulse: 94 (10/17/18 1553)  Resp: 15 (10/17/18 1553)  BP: 110/63 (10/17/18 1553)  SpO2: 100 % (10/17/18 1553)  O2 Device (Oxygen Therapy): room air (10/17/18 0330) Vital Signs (24h Range):  Temp:  [37.4 °F (3 °C)-99.3 °F (37.4 °C)] 99.3 °F (37.4 °C)  Pulse:  [] 94  Resp:  [10-16] 15  SpO2:  [96 %-100 %] 100 %  BP: ()/(55-70) 110/63     Weight: 68.3 kg (150 lb 9.2 oz) (10/17/18 0330)  Body mass index is 26.67 kg/m².  Body surface area is 1.74 meters squared.    I/O last 3 completed shifts:  In: 2300 [P.O.:2050; Blood:250]  Out: 3050 [Urine:3050]    Physical Exam  Constitutional: She is oriented to person, place, and time. She appears well-developed.   Temporal and distal extremity muscle wasting; yellowing of skin  Eyes: EOM are normal. Pupils are equal, round, and reactive to light. Scleral icterus is present.   Neck: Normal range of motion.   Cardiovascular: Normal rate and regular rhythm.   No murmur heard.  Pulmonary/Chest: Effort normal and breath sounds normal. No respiratory distress. She has no wheezes.   Abdominal: Soft. Bowel sounds are normal. She exhibits distension. Chevron incision w/ staples, CDI  Musculoskeletal: Normal range of motion. She exhibits edema (BL lower extremities).   Neurological: She is alert and oriented to person, place, and time.   Skin: Skin is warm and dry.     Significant Labs:  CBC:   Recent Labs   Lab  10/17/18   0600   WBC  12.24   RBC  2.71*   HGB  8.0*   HCT  24.0*   PLT  81*   MCV  89    MCH  29.5   MCHC  33.3     CMP:   Recent Labs   Lab  10/17/18   0600   GLU  181*   CALCIUM  8.2*   ALBUMIN  2.4*   PROT  3.9*   NA  129*   K  3.5   CO2  25   CL  90*   BUN  120*   CREATININE  2.5*   ALKPHOS  151*   ALT  46*   AST  31   BILITOT  5.3*     All labs within the past 24 hours have been reviewed.     Significant Imaging:  US Liver Transplant (10/15):   Impression         Slight interval improvement in the left hepatic artery resistive indices with persistent stable elevation of the remaining hepatic resistive indices.  Findings are favored to be secondary to liver edema.  Reducing peritransplant fluid collections.  Mild volume ascites.  Partially visualized right pleural effusion.      10/11/2018 US Retroperitoneal:   Impression         Upper normal to mildly elevated renal resistive indices suggestive of medical renal disease.  Ascites.     Assessment/Plan:     Post LT - Acute renal failure         - acute gradual worsening of renal function s/p OLT  - 10/06 was taken to the OR for possibility of billiary leak and post op complication due to hypercapnic hypoxic respiratory failure, no blood pressure drop intraoperatively per anesthesiology notes. Patient has not been needing vasopressors to maintain BP  - Creatinine on arrival 0.8 and has been up trending   - BUN/cr (10/11/18): 91/1.9; (10/12/18): 100/2.0  - Tacrolimus 26.9 (10/08); 6.8 (10/12)  - Protein Creatine ratio: 0.82  - UA consistent with hematuria, proteinuria, RBC and amorphous casts   - Fena 0.5%; consistent with hypovolemia   - Renal US negative for obstruction, stricture or, hydronephrosis  - (10/16/18): BUN/Cr: 114/2.2. (10/17): 120/2.5    Assessment: Acute Renal failure in setting of hypovolemia in setting of acute gastrointestinal bleed. acute tubular necrosis from immunosuppressive agents      Plan:  -Given rising BUN, delta creatinine 0.6 10/16 and 0.3 10/17 in setting of gastrointestinal bleed will do SLED (10/17) for 6 hours with Na  130, K 4.0 with no fluid removal.   - Continue fluid restriction intake max 1L/24 hours  - avoid nephrotoxic drugs, ACEI/ARB, NSAIDS  - adjust drugs to GFR: bactrim three times a week  - Strict I and O's. Monitor UOP           Acute blood loss anemia  BUN/Cr: 114/2.2(10/16) -> 120/2.5(10/17). Creatinine is continuing to rise; however, BUN elevated disproportionately. Patient has been transfused 2 pRBC on (10/14) and Hb today 6.9. Transfused 1 pRBC (10/16).   -  FOBT positive  - GI consulted; EGD in am, NPO past midnight. F/u reccs     Thank you for your consult. I will follow-up with patient. Please contact us if you have any additional questions.     Darrel Adhikari MD   Internal Medicine PGY II  Pager: [phone]  Nephrology  [facility]

## 2018-10-17 NOTE — PLAN OF CARE
Problem: Patient Care Overview  Goal: Plan of Care Review  Outcome: Ongoing (interventions implemented as appropriate)  Recommendations  Recommendation/Intervention:   1. When medically able, ADAT.   2. Recommend Boost Glucose Control with meals to aid in caloric and proteins.  3. If TPN desired,  Recommend    -95g AA, 280g Dextrose- provides 95gm protein, 1332kcal +Lipds (GIR 2.5)   4. If TF desired, Recommend    -Novasource Renal at goal rate of 40mL/hr- provides 1920kcal, 87gm protein and 688mL free fluid.   -Glucerna 1.5 at goal rate of 55mL/hr - provides 1980kcal, 109gm and 1002mL free fluid.   5. Dietitian Following    Goals:   1. Patient to meet >85% EEN/EPN.   2. Promote nutrition related labs WNL  Nutrition Goal Status: progressing towards goal     Nutrition Discharge Planning: Post transplant nutrition education provided. Food safety/drug interactions emphasized. General healthy diet recommended. Dietitian name/contact information, education material left.  No other needs identified. Caregiver present.      NFPE completed 10/3/18  Severe malnutrition     Nutrition Problem  Severe Malnutrition in the context of Chronic Illness/Injury     Related to (etiology):  ESLD     Signs and Symptoms (as evidenced by):  Energy Intake: <75% of estimated energy requirement for 2 months  Body Fat Depletion: Severe depletion of orbitals and triceps   Muscle Mass Depletion: Severe depletion of temples, clavicle region and lower extremities   Weight Loss: 12.31% x 1 month     Nutrition Diagnosis Status:  Continues     Full assessment completed, see Dietitian Note 10/17/2018.

## 2018-10-17 NOTE — ASSESSMENT & PLAN NOTE
BG goal 140 - 180     Increase Levemir to 13 units HS.   Increase Novolog to 17 units with meals.   2 units PRN protein shake  Low dose correction scale insulin  BG monitoring AC/HS      Discharge planning:  TBD

## 2018-10-17 NOTE — ASSESSMENT & PLAN NOTE
Managed per primary team  Avoid hypoglycemia    Recent Labs   Lab  10/17/18   0600   ALT  46*   AST  31   ALKPHOS  151*   BILITOT  5.3*   PROT  3.9*   ALBUMIN  2.4*

## 2018-10-17 NOTE — ANESTHESIA PREPROCEDURE EVALUATION
[facility]  Anesthesia Pre-Operative Evaluation         Patient Name: Scarlett Reddy  YOB: 1950  MRN: [id number]    SUBJECTIVE:     Pre-operative evaluation for Procedure(s) (LRB):  EGD (ESOPHAGOGASTRODUODENOSCOPY) (N/A)     10/17/2018    Scarlett Reddy is a 68 y.o. 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 Post op course complicated by EVA requiring CRRT on 10/15. Patient continues to have drops in hgb. Plan for EGD to rule out GI bleed. Patient has received 2u prbc 10/14 and 1u prbc 10/16.     Patient is from [facility] and requires a . Her  is Baileyu [phone]      Patient now presents for the above procedure(s).    LDA:       Percutaneous Central Line Insertion/Assessment - triple lumen  10/04/18 2311 right internal jugular (Active)   Dressing biopatch in place;dressing dry and intact 10/17/2018  8:15 AM   Securement secured w/ sutures 10/17/2018  8:15 AM   Additional Site Signs no drainage;no streak formation;no pain;no palpable cord;no edema;no warmth;no erythema 10/17/2018  8:15 AM   Distal Patency/Care normal saline locked 10/17/2018  8:15 AM   Medial Patency/Care normal saline locked 10/17/2018  8:15 AM   Proximal Patency/Care normal saline locked 10/17/2018  8:15 AM   Waveform normal 10/8/2018 11:05 AM   Line Interventions line leveled/zeroed 10/8/2018 11:05 AM   Dressing Change Due 10/23/18 10/17/2018  8:15 AM   Daily Line Review Performed 10/16/2018  7:03 PM   Number of days: 12       Prev airway: Uncomplicated prior intubation using 7.0 ETT, Jay 2 blade; Grade 1 view    Drips:   sodium chloride 0.9%         Patient Active Problem List   Diagnosis    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    Type 2 diabetes mellitus with diabetic polyneuropathy, with long-term current use of insulin    Esophageal varices without bleeding     Severe malnutrition    Anemia requiring transfusions    Hypotension    Hyponatremia    Physical deconditioning    Weakness    Ascites    Generalized abdominal pain    Liver transplanted    Corticosteroids adverse reaction    Prophylactic immunotherapy    Encounter for weaning from ventilator    Long-term use of immunosuppressant medication    At risk for opportunistic infections    Metabolic acidosis    EVA (acute kidney injury)    Hypervolemia associated with renal insufficiency     Post LT - Acute renal failure    Acute blood loss anemia       Review of patient's allergies indicates:  No Known Allergies    Current Inpatient Medications:   bisacodyl  10 mg Oral QHS    ciprofloxacin HCl  500 mg Oral Daily    docusate sodium  50 mg Oral TID    entecavir  0.5 mg Oral Q72H    ergocalciferol  50,000 Units Oral Q7 Days    fluconazole  200 mg Oral Daily    furosemide  80 mg Intravenous BID    heparin (porcine)  5,000 Units Subcutaneous Q8H    insulin aspart U-100  17 Units Subcutaneous TIDWM    insulin detemir U-100  13 Units Subcutaneous QHS    midodrine  15 mg Oral TID    mirtazapine  7.5 mg Oral QHS    mycophenolate  1,000 mg Oral BID    pantoprazole  40 mg Intravenous BID    predniSONE  15 mg Oral Daily    Followed by    [START ON 10/24/2018] predniSONE  10 mg Oral Daily    Followed by    [START ON 10/31/2018] predniSONE  5 mg Oral Daily    Followed by    [START ON 11/7/2018] predniSONE  2.5 mg Oral Daily    sodium bicarbonate  1,300 mg Oral BID    sulfamethoxazole-trimethoprim 400-80mg  1 tablet Oral Every Mon, Wed, Fri    tacrolimus  0.5 mg Oral BID    ursodiol  300 mg Oral BID    [START ON 10/18/2018] valGANciclovir  450 mg Oral Twice Weekly       No current facility-administered medications on file prior to encounter.      Current Outpatient Medications on File Prior to Encounter   Medication Sig Dispense Refill    insulin aspart U-100 (NOVOLOG FLEXPEN U-100 INSULIN) 100  "unit/mL InPn pen Inject 20 units into the skin with breakfast,17 units with lunch, and 17 with dinner Plus correction scale, max TDD 60 units daily 15 mL 1    insulin glargine (LANTUS) 100 unit/mL injection Inject 20 Units into the skin every evening. 15 mL 3    midodrine (PROAMATINE) 5 MG Tab Take 3 tablets (15 mg total) by mouth every 8 (eight) hours. 270 tablet 5    OMEGA-3 FATTY ACIDS-EPA ORAL Take by mouth.      rifAXIMin (XIFAXAN) 550 mg Tab Take 1 tablet (550 mg total) by mouth 2 (two) times daily. 60 tablet 5    sodium bicarbonate 650 MG tablet Take 2 tablets (1,300 mg total) by mouth 3 (three) times daily. 120 tablet 11    blood sugar diagnostic (ACCU-CHEK DC) Strp 1 strip by Misc.(Non-Drug; Combo Route) route 3 (three) times daily.      pen needle, diabetic (BD ULTRA-FINE CITLALI PEN NEEDLE) 32 gauge x 5/32" Ndle To use to inject insulin 4x daily 100 each 3       Past Surgical History:   Procedure Laterality Date    EGD (ESOPHAGOGASTRODUODENOSCOPY) N/A 8/17/2018    Performed by Travon Delgadillo MD at [facility] ([address] [place] [address])    ESOPHAGOGASTRODUODENOSCOPY N/A 8/17/2018    Procedure: EGD (ESOPHAGOGASTRODUODENOSCOPY);  Surgeon: Travon Delgadillo MD;  Location: [address]);  Service: Endoscopy;  Laterality: N/A;    EXPLORATORY LAPAROTOMY AFTER LIVER TRANSPLANTATION N/A 10/6/2018    Procedure: LAPAROTOMY, EXPLORATORY, AFTER LIVER TRANSPLANT;  Surgeon: Benson Matson MD;  Location: [facility] OR [address] [place] [address];  Service: Transplant;  Laterality: N/A;    LAPAROTOMY, EXPLORATORY, AFTER LIVER TRANSPLANT N/A 10/6/2018    Performed by Benson Matson MD at [facility] OR [address] [place] [address]    LIVER TRANSPLANT N/A 10/4/2018    Procedure: TRANSPLANT, LIVER;  Surgeon: Yony Burns MD;  Location: [facility] OR [address] [place] [address];  Service: Transplant;  Laterality: N/A;    TRANSPLANT, LIVER N/A 10/4/2018    Performed by Yony Burns MD at [facility] OR 2ND FLR       Social History     Socioeconomic History    Marital status:      " Spouse name: Not on file    Number of children: Not on file    Years of education: Not on file    Highest education level: Not on file   Social Needs    Financial resource strain: Not on file    Food insecurity - worry: Not on file    Food insecurity - inability: Not on file    Transportation needs - medical: Not on file    Transportation needs - non-medical: Not on file   Occupational History    Not on file   Tobacco Use    Smoking status: Never Smoker    Smokeless tobacco: Never Used   Substance and Sexual Activity    Alcohol use: No     Frequency: Never    Drug use: No    Sexual activity: Not on file   Other Topics Concern    Not on file   Social History Narrative    Not on file       OBJECTIVE:     Vital Signs Range (Last 24H):  Temp:  [36.6 °C (97.9 °F)-37 °C (98.6 °F)]   Pulse:  []   Resp:  [10-15]   BP: ()/(55-71)   SpO2:  [96 %-100 %]       Significant Labs:  Lab Results   Component Value Date    WBC 12.24 10/17/2018    HGB 8.0 (L) 10/17/2018    HCT 24.0 (L) 10/17/2018    PLT 81 (L) 10/17/2018    ALT 46 (H) 10/17/2018    AST 31 10/17/2018     (L) 10/17/2018    K 3.5 10/17/2018    CL 90 (L) 10/17/2018    CREATININE 2.5 (H) 10/17/2018     (H) 10/17/2018    CO2 25 10/17/2018    TSH 4.113 (H) 08/14/2018    INR 1.0 10/17/2018    HGBA1C 6.0 (H) 10/04/2018       Diagnostic Studies: No relevant studies.    EKG:   Vent. Rate : 085 BPM     Atrial Rate : 085 BPM     P-R Int : 170 ms          QRS Dur : 086 ms      QT Int : 392 ms       P-R-T Axes : 051 041 061 degrees     QTc Int : 466 ms    Normal sinus rhythm  Normal ECG  When compared with ECG of 01-OCT-2018 15:53,  No significant change was found  Confirmed by Bobby BARROS MD, Juventino ELLISON ([id number]) on 10/5/2018 9:51:29 AM    Referred By: [email or web address]   SELF           Confirmed By:Juventino Rosales III, MD    2D ECHO:  Results for orders placed or performed during the hospital encounter of 10/01/18   2D echo with color flow doppler    Result Value Ref Range    Calculated EF 75 55 - 65    Mitral Valve Regurgitation TRIVIAL     Diastolic Dysfunction Yes (A)     Aortic Valve Regurgitation MILD     Mitral Valve Mobility NORMAL     Tricuspid Valve Regurgitation TRIVIAL    CONCLUSIONS     1 - Hyperdynamic left ventricular systolic function (EF >70%).     2 - Impaired LV relaxation, normal LAP (grade 1 diastolic dysfunction).     3 - Normal right ventricular systolic function .     4 - No wall motion abnormalities.     5 - Mild aortic regurgitation.     6 - Left pleural effusion.       ASSESSMENT/PLAN:         Anesthesia Evaluation    I have reviewed the Patient Summary Reports.     I have reviewed the Medications.     Review of Systems  Anesthesia Hx:  No problems with previous Anesthesia  History of prior surgery of interest to airway management or planning:  Denies Personal Hx of Anesthesia complications.   Hematology/Oncology:         -- Anemia:   EENT/Dental:EENT/Dental Normal   Cardiovascular:   Denies MI.   Denies CABG/stent.   Denies Angina. CHF            Pulmonary:  Pulmonary Normal    Renal/:  Renal/ Normal     Hepatic/GI:   Liver Disease, Hepatitis, B Liver transplant   Endocrine:   Diabetes, type 2        Physical Exam  General:  Cachexia    Airway/Jaw/Neck:  Airway Findings: Mouth Opening: Normal General Airway Assessment: Adult  Mallampati: II  Improves to I with phonation.  TM Distance: 4 - 6 cm  Jaw/Neck Findings:  Neck ROM: Normal Extension, Painful   Right IJ   Eyes/Ears/Nose:  EYES/EARS/NOSE FINDINGS: Normal   Dental:  Dental Findings: In tact   Chest/Lungs:  Chest/Lungs Findings: Clear to auscultation, Normal Respiratory Rate     Heart/Vascular:  Heart Findings: Rate: Normal  Rhythm: Regular Rhythm  Sounds: Normal  Heart murmur: negative       Mental Status:  Mental Status Findings:  Cooperative, Alert and Oriented         Anesthesia Plan  Type of Anesthesia, risks & benefits discussed:  Anesthesia Type:  general  Patient's  Preference:   Intra-op Monitoring Plan: standard ASA monitors  Intra-op Monitoring Plan Comments:   Post Op Pain Control Plan: multimodal analgesia, IV/PO Opioids PRN and per primary service following discharge from PACU  Post Op Pain Control Plan Comments:   Induction:   IV  Beta Blocker:  Patient is not currently on a Beta-Blocker (No further documentation required).       Informed Consent: Patient understands risks and agrees with Anesthesia plan.  Questions answered. Anesthesia consent signed with patient.  ASA Score: 4     Day of Surgery Review of History & Physical:    H&P update referred to the provider.     Anesthesia Plan Notes:   68F DM2, HBV/delta cirrhosis s/p OLT, acute anemia for EGD under GA NC TIVA        Ready For Surgery From Anesthesia Perspective.

## 2018-10-17 NOTE — ASSESSMENT & PLAN NOTE
- Creatinine on arrival 0.8 and has been up trending   BUN/cr (10/11/18): 91/1.9   Tacrolimus 26.9 (10/08)  Nephrology consulted  Renal US (10/11): no hydronephrosis  Echo 10/12: diastolic dysfunction, likely 2/2 to ARF  - pt on renal diet w 1500 ml FR.  Concern for uremia- plan for CRRT

## 2018-10-17 NOTE — PROGRESS NOTES
"[facility]  Endocrinology  Progress Note    Admit Date: 10/1/2018     Reason for Consult: Management of type 2 DM, Hyperglycemia      Surgical Procedure and Date: Liver transplant 10/5/18, Ex lap 10/6/18     Diabetes diagnosis year:      Home Diabetes Medications:  Lantus 18 units HS and novolog 17 units with meals plus correction scale (150-200 +1)        Lab Results   Component Value Date     HGBA1C 6.8 (H) 2018        How often checking glucose at home? 3-4   BG readings on regimen: 120-150.  Post prandial readings as high as upper 200s  Hypoglycemia on the regimen? yes, none recently   Missed doses on regimen?  No     Diabetes Complications include:     Diabetic peripheral neuropathy      Complicating diabetes co morbidities:   CIRRHOSIS        HPI:  Patient is a 68 y.o. female with a diagnosis of ESLD, listed for liver transplant with meld 23 who underwent live transplant on 10/5/18.  Back to OR on 10/6/18 for ex lap.  Admitted 10/1/18 for hyponatremia s/p paracentesis. She speaks primarily Maltese. Information obtained from recent past admission and discussion over the phone via  ([facility]). Family not at bedside. Pt request family at bedside for future conversations. Personal has known diagnosis of diabetes, endocrine consulted for diabetes management.        Interval HPI:   Overnight events: Remains in TSU. BG slightly above goal this am, markedly elevated yesterday during the day.  Prednisone  20 mg daily; standard steroid taper.  Eatin% - however patient eats very slowly and likely has post prandial excursion reflected in the lunch and dinner BG checks  Nausea: No  Hypoglycemia and intervention: No  Fever: No  TPN and/or TF: No    BP (!) 103/55   Pulse 97   Temp 98.3 °F (36.8 °C) (Oral)   Resp 10   Ht 5' 3" (1.6 m)   Wt 68.3 kg (150 lb 9.2 oz)   LMP  (LMP Unknown)   SpO2 97%   Breastfeeding? No   BMI 26.67 kg/m²      Labs Reviewed and Include    No " results for input(s): GLU, CALCIUM, ALBUMIN, PROT, NA, K, CO2, CL, BUN, CREATININE, ALKPHOS, ALT, AST, BILITOT in the last 24 hours.  Lab Results   Component Value Date    WBC 12.24 10/17/2018    HGB 8.0 (L) 10/17/2018    HCT 24.0 (L) 10/17/2018    MCV 89 10/17/2018    PLT 81 (L) 10/17/2018     No results for input(s): TSH, FREET4 in the last 168 hours.  Lab Results   Component Value Date    HGBA1C 6.0 (H) 10/04/2018       Nutritional status:   Body mass index is 26.67 kg/m².  Lab Results   Component Value Date    ALBUMIN 2.5 (L) 10/16/2018    ALBUMIN 2.3 (L) 10/15/2018    ALBUMIN 2.3 (L) 10/15/2018    ALBUMIN 2.3 (L) 10/15/2018     Lab Results   Component Value Date    PREALBUMIN 12 (L) 09/17/2018       Estimated Creatinine Clearance: 22.4 mL/min (A) (based on SCr of 2.2 mg/dL (H)).    Accu-Checks  Recent Labs      10/14/18   2053  10/15/18   0839  10/15/18   1410  10/15/18   1816  10/15/18   2021  10/16/18   0733  10/16/18   1101  10/16/18   1223  10/16/18   1732  10/16/18   2140   POCTGLUCOSE  220*  126*  240*  334*  278*  189*  262*  293*  354*  363*       Current Medications and/or Treatments Impacting Glycemic Control  Immunotherapy:    Immunosuppressants         Stop Route Frequency     tacrolimus capsule 0.5 mg      -- Oral 2 times daily     mycophenolate capsule 1,000 mg      -- Oral 2 times daily        Steroids:   Hormones (From admission, onward)    Start     Stop Route Frequency Ordered    10/11/18 0900  predniSONE tablet 20 mg      10/18 0859 Oral Daily 10/10/18 0829        Pressors:    Autonomic Drugs (From admission, onward)    Start     Stop Route Frequency Ordered    10/10/18 1500  midodrine tablet 15 mg      -- Oral 3 times daily 10/10/18 1122    10/06/18 1617  rocuronium (ZEMURON) 10 mg/mL injection     Comments:  Created by cabinet override    10/07 6049   10/06/18 1617        Hyperglycemia/Diabetes Medications:   Antihyperglycemics (From admission, onward)    Start     Stop Route Frequency  Ordered    10/16/18 1745  insulin aspart U-100 pen 13 Units      -- SubQ 3 times daily with meals 10/16/18 1738    10/15/18 2100  insulin detemir U-100 pen 12 Units      -- SubQ Nightly 10/15/18 1533    10/15/18 1819  insulin aspart U-100 pen 2 Units      -- SubQ As needed (PRN) 10/15/18 1720    10/14/18 0925  insulin aspart U-100 pen 0-5 Units      -- SubQ Before meals & nightly PRN 10/14/18 0825    10/07/18 1200  insulin regular (Humulin R) 100 Units in sodium chloride 0.9% 100 mL infusion      10/13 2100 IV Continuous 10/07/18 1055          ASSESSMENT and PLAN    * Liver transplanted    Managed per primary team  Avoid hypoglycemia    Recent Labs   Lab  10/17/18   0600   ALT  46*   AST  31   ALKPHOS  151*   BILITOT  5.3*   PROT  3.9*   ALBUMIN  2.4*               Type 2 diabetes mellitus with diabetic polyneuropathy, with long-term current use of insulin    BG goal 140 - 180     Increase Levemir to 13 units HS.   Increase Novolog to 17 units with meals.   2 units PRN protein shake  Low dose correction scale insulin  BG monitoring AC/HS      Discharge planning:  TBD        Severe malnutrition    Oral intake encouraged, may affect BG readings        Corticosteroids adverse reaction    On standard steroid taper per transplant team; may elevate BG readings            Prophylactic immunotherapy    May increase insulin resistance.             Time spent on unit 30 minutes with > 50% in face to face counseling with  (Olga)    Be Willams NP  Endocrinology  [facility]

## 2018-10-17 NOTE — PROGRESS NOTES
[facility]  Adult Nutrition  Progress Note     SUMMARY       Recommendations  Recommendation/Intervention:   1. When medically able, ADAT.   2. Recommend Boost Glucose Control with meals to aid in caloric and proteins.  3. If TPN desired,  Recommend    -95g AA, 280g Dextrose- provides 95gm protein, 1332kcal +Lipds (GIR 2.5)   4. If TF desired, Recommend    -Novasource Renal at goal rate of 40mL/hr- provides 1920kcal, 87gm protein and 688mL free fluid.   - Glucerna 1.5 at goal rate of 55mL/hr - provides 1980kcal, 109gm and 1002mL free fluid.   5. Dietitian Following    Goals:   1. Patient to meet >85% EEN/EPN.   2. Promote nutrition related labs WNL  Nutrition Goal Status: new  Communication of RD Recs: (POC)    General Information Comments: Pt s/p OLTx 10/5. Language barrier. Pt NPO at time of visit. Now on Renal Diet. Previous decreased po intake, family provides encouragement with meals. Pt on HD. Continues shake. No oral supplement in orders on EPIC. Denies N/V. Post transplant education provided.  NFPE completed 10/3/18. Education material provided, discussed translation with .    Nutrition Discharge Planning: Post transplant nutrition education provided. Food safety/drug interactions emphasized. General healthy diet recommended. Dietitian name/contact information, education material left.  No other needs identified. Caregiver present.     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, DM  Interdisciplinary Rounds: attended    Nutrition Risk Screen  Nutrition Risk Screen: cultural or [employer] food preferences    Nutrition/Diet History  Patient Reported Diet/Restrictions/Preferences: diabetic diet, low salt(Cultural preferences)  Food Preferences: Cultural preferences Noted  Do you have any cultural, spiritual,  "Yarsanism conflicts, given your current situation?: none noted   Supplemental Drinks or Food Habits: Boost Plus, Ensure Plus(Strawberry)  Food Allergies: NKFA  Factors Affecting Nutritional Intake: abdominal distention, decreased appetite    Anthropometrics  Temp: 97.9 °F (36.6 °C)  Height Method: Stated  Height: 5' 3" (160 cm)  Height (inches): 63 in  Weight Method: Bed Scale  Weight: 68.3 kg (150 lb 9.2 oz)  Weight (lb): 150.58 lb  Ideal Body Weight (IBW), Female: 115 lb  % Ideal Body Weight, Female (lb): 101.8 lb  BMI (Calculated): 20.8  BMI Grade: 18.5-24.9 - normal  Weight Loss: unintentional  Weight Loss Since Admission: 16 lb 0.4 oz     Lab/Procedures/Meds  Labs: Reviewed    (L) 10/17/2018    K 3.5 10/17/2018     (H) 10/17/2018    CREATININE 2.5 (H) 10/17/2018    CALCIUM 8.2 (L) 10/17/2018    PHOS 3.9 10/17/2018    MG 1.6 10/17/2018    EGFRNONAA 19.0 (A) 10/17/2018    ALBUMIN 2.4 (L) 10/17/2018      ALT 46 (H) 10/17/2018    AST 31 10/17/2018    ALKPHOS 151 (H) 10/17/2018     POCT Glucose   Date Value Ref Range Status   10/16/2018 363 (H) 70 - 110 mg/dL Final   10/16/2018 354 (H) 70 - 110 mg/dL Final   10/16/2018 293 (H) 70 - 110 mg/dL Final   10/16/2018 262 (H) 70 - 110 mg/dL Final     Meds: Reviewed  Scheduled Meds:   bisacodyl  10 mg Oral QHS    docusate sodium  50 mg Oral TID    entecavir  0.5 mg Oral Q72H    ergocalciferol  50,000 Units Oral Q7 Days    fluconazole  200 mg Oral Daily    furosemide  80 mg Intravenous BID    heparin (porcine)  5,000 Units Subcutaneous Q8H    insulin aspart U-100  17 Units Subcutaneous TIDWM    insulin detemir U-100  13 Units Subcutaneous QHS    midodrine  15 mg Oral TID    mirtazapine  7.5 mg Oral QHS    mycophenolate  1,000 mg Oral BID    pantoprazole  40 mg Intravenous BID    predniSONE  15 mg Oral Daily     Physical Findings/Assessment  Overall Physical Appearance: loss of subcutaneous fat, loss of muscle mass  Oral/Mouth Cavity: tooth/teeth " missing  Skin: incision(s), edema    Estimated/Assessed Needs  Weight Used For Calorie Calculations: 66 kg (145 lb 8.1 oz)  Energy Calorie Requirements (kcal): 1223-5518(25-30 kcal/kg)  Energy Need Method: Kcal/kg  Protein Requirements: 79-99(1.2-1.5 gm/kg)  Weight Used For Protein Calculations: 66 kg (145 lb 8.1 oz)  Fluid Requirements (mL): 1 mL/kcal or per MD  Fluid Need Method: RDA Method  RDA Method (mL): 1650     Nutrition Prescription Ordered  Current Diet Order: Renal     Evaluation of Received Nutrient/Fluid Intake in last 24h  I/O: +15.8L since 10/3/18  Comments: LBM 10/16/18  % Intake of Estimated Energy Needs: 25 - 50 %  % Meal Intake: 0 - 25 %    Nutrition Risk  Level of Risk/Frequency of Follow-up: low(1x week)     Assessment and Plan  NFPE completed 10/3/18  Severe malnutrition     Nutrition Problem  Severe Malnutrition in the context of Chronic Illness/Injury     Related to (etiology):  ESLD     Signs and Symptoms (as evidenced by):  Energy Intake: <75% of estimated energy requirement for 2 months  Body Fat Depletion: Severe depletion of orbitals and triceps   Muscle Mass Depletion: Severe depletion of temples, clavicle region and lower extremities   Weight Loss: 12.31% x 1 month     Nutrition Diagnosis Status:  Continues     Monitor and Evaluation  Food and Nutrient Intake: energy intake, food and beverage intake  Food and Nutrient Adminstration: diet order  Knowledge/Beliefs/Attitudes: food and nutrition knowledge/skill  Physical Activity and Function: nutrition-related ADLs and IADLs  Anthropometric Measurements: weight change, weight  Biochemical Data, Medical Tests and Procedures: electrolyte and renal panel, gastrointestinal profile, glucose/endocrine profile, inflammatory profile, lipid profile  Nutrition-Focused Physical Findings: overall appearance     Nutrition Follow-Up  RD Follow-up?: Yes

## 2018-10-17 NOTE — ASSESSMENT & PLAN NOTE
- Na 119 on admit.  -Na was improving post transplant w HD.  - Pt POD #9 w EVA, Na worse today at 125.  Changed FR to 1L.  - Na stable post CRRT  -Na trending down. Monitor

## 2018-10-17 NOTE — SUBJECTIVE & OBJECTIVE
"Interval HPI:   Overnight events: Remains in TSU. BG slightly above goal this am, markedly elevated yesterday during the day.  Prednisone 20 mg daily; standard steroid taper.  Eatin% - however patient eats very slowly and likely has post prandial excursion reflected in the lunch and dinner BG checks  Nausea: No  Hypoglycemia and intervention: No  Fever: No  TPN and/or TF: No    BP (!) 103/55   Pulse 97   Temp 98.3 °F (36.8 °C) (Oral)   Resp 10   Ht 5' 3" (1.6 m)   Wt 68.3 kg (150 lb 9.2 oz)   LMP  (LMP Unknown)   SpO2 97%   Breastfeeding? No   BMI 26.67 kg/m²     Labs Reviewed and Include    No results for input(s): GLU, CALCIUM, ALBUMIN, PROT, NA, K, CO2, CL, BUN, CREATININE, ALKPHOS, ALT, AST, BILITOT in the last 24 hours.  Lab Results   Component Value Date    WBC 12.24 10/17/2018    HGB 8.0 (L) 10/17/2018    HCT 24.0 (L) 10/17/2018    MCV 89 10/17/2018    PLT 81 (L) 10/17/2018     No results for input(s): TSH, FREET4 in the last 168 hours.  Lab Results   Component Value Date    HGBA1C 6.0 (H) 10/04/2018       Nutritional status:   Body mass index is 26.67 kg/m².  Lab Results   Component Value Date    ALBUMIN 2.5 (L) 10/16/2018    ALBUMIN 2.3 (L) 10/15/2018    ALBUMIN 2.3 (L) 10/15/2018    ALBUMIN 2.3 (L) 10/15/2018     Lab Results   Component Value Date    PREALBUMIN 12 (L) 2018       Estimated Creatinine Clearance: 22.4 mL/min (A) (based on SCr of 2.2 mg/dL (H)).    Accu-Checks  Recent Labs      10/14/18   2053  10/15/18   0839  10/15/18   1410  10/15/18   1816  10/15/18   2021  10/16/18   0733  10/16/18   1101  10/16/18   1223  10/16/18   1732  10/16/18   2140   POCTGLUCOSE  220*  126*  240*  334*  278*  189*  262*  293*  354*  363*       Current Medications and/or Treatments Impacting Glycemic Control  Immunotherapy:    Immunosuppressants         Stop Route Frequency     tacrolimus capsule 0.5 mg      -- Oral 2 times daily     mycophenolate capsule 1,000 mg      -- Oral 2 times daily    "     Steroids:   Hormones (From admission, onward)    Start     Stop Route Frequency Ordered    10/11/18 0900  predniSONE tablet 20 mg      10/18 0859 Oral Daily 10/10/18 0829        Pressors:    Autonomic Drugs (From admission, onward)    Start     Stop Route Frequency Ordered    10/10/18 1500  midodrine tablet 15 mg      -- Oral 3 times daily 10/10/18 1122    10/06/18 1617  rocuronium (ZEMURON) 10 mg/mL injection     Comments:  Created by cabinet override    10/07 0429   10/06/18 1617        Hyperglycemia/Diabetes Medications:   Antihyperglycemics (From admission, onward)    Start     Stop Route Frequency Ordered    10/16/18 1745  insulin aspart U-100 pen 13 Units      -- SubQ 3 times daily with meals 10/16/18 1738    10/15/18 2100  insulin detemir U-100 pen 12 Units      -- SubQ Nightly 10/15/18 1533    10/15/18 1819  insulin aspart U-100 pen 2 Units      -- SubQ As needed (PRN) 10/15/18 1720    10/14/18 0925  insulin aspart U-100 pen 0-5 Units      -- SubQ Before meals & nightly PRN 10/14/18 0825    10/07/18 1200  insulin regular (Humulin R) 100 Units in sodium chloride 0.9% 100 mL infusion      10/13 2100 IV Continuous 10/07/18 1055

## 2018-10-17 NOTE — PROGRESS NOTES
CRRT started to [facility].  Tolerated well.  As discussed with primary nurse, RFP to be drawn at 2030.  Results to be called to nephrologist per dialysis nurse.

## 2018-10-17 NOTE — PROGRESS NOTES
CRRT initiated by Juan Quintanilla RN.  Access obtained through [facility].  No issues.  Renal Function Panel to be drawn for 2030.  Results to be called to nephrologist per dialysis nurse.  Q1h monitoring initiated.  VSS.

## 2018-10-17 NOTE — ASSESSMENT & PLAN NOTE
- pt w decreased H/H.  Transfuse total 1 u PRBC 10/14 and 10/16  - pt c/o right side pain and dark stools  -FOBT pending  -LDH elevated, Hapto < 10  -Plan for EGD in the morning  -cont to monitor

## 2018-10-17 NOTE — ASSESSMENT & PLAN NOTE
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   -POD 1 US: increased arterial resistive indices, suggestive of edema vs cute rejection vs congestion  -Repeat US 10/9 with continued elevation of RIs  -LFTs trending down  -T bili trending down  -repeat liver US (10/11): elevated RIs  - LFTs trending down.

## 2018-10-17 NOTE — ASSESSMENT & PLAN NOTE
Patient has been transfused total 2 pRBC (10/14. 10/16)  Possible concern of GI bleed in setting of post transplant, and complaints of black stool.  - FOBT pending

## 2018-10-17 NOTE — SUBJECTIVE & OBJECTIVE
Interval History: Patient is verbal and communicative. Continues to complaint of bilateral peripheral edema up to hip region. Also reports black stools for the past 3-4 days but no melana reported today 10/17, has abdominal distention and fatigue. Denies chest pain, dyspnea, N/V, diarrhea or constipation.    Review of patient's allergies indicates:  No Known Allergies  Current Facility-Administered Medications   Medication Frequency    0.9%  NaCl infusion (CRRT USE ONLY) Continuous    bisacodyl EC tablet 10 mg QHS    bisacodyl suppository 10 mg Daily PRN    calcium carbonate 200 mg calcium (500 mg) chewable tablet 500 mg TID PRN    ciprofloxacin HCl tablet 500 mg Daily    dextrose 50% injection 12.5 g PRN    dextrose 50% injection 25 g PRN    docusate sodium capsule 50 mg TID    entecavir tablet 0.5 mg Q72H    ergocalciferol capsule 50,000 Units Q7 Days    fluconazole tablet 200 mg Daily    furosemide injection 80 mg BID    glucagon (human recombinant) injection 1 mg PRN    glucose chewable tablet 16 g PRN    glucose chewable tablet 24 g PRN    heparin (porcine) injection 5,000 Units Q8H    insulin aspart U-100 pen 0-5 Units QID (AC + HS) PRN    insulin aspart U-100 pen 17 Units TIDWM    insulin aspart U-100 pen 2 Units PRN    insulin detemir U-100 pen 13 Units QHS    magnesium sulfate 2g in water 50mL IVPB (premix) PRN    midodrine tablet 15 mg TID    mirtazapine tablet 7.5 mg QHS    mycophenolate capsule 1,000 mg BID    ondansetron disintegrating tablet 8 mg Q8H PRN    oxyCODONE immediate release tablet 5 mg Q4H PRN    oxyCODONE immediate release tablet Tab 10 mg Q4H PRN    pantoprazole injection 40 mg BID    polyethylene glycol packet 17 g Daily PRN    predniSONE tablet 15 mg Daily    Followed by    [START ON 10/24/2018] predniSONE tablet 10 mg Daily    Followed by    [START ON 10/31/2018] predniSONE tablet 5 mg Daily    Followed by    [START ON 11/7/2018] predniSONE tablet 2.5 mg  Daily    promethazine (PHENERGAN) 6.25 mg in dextrose 5 % 50 mL IVPB Q6H PRN    sodium bicarbonate tablet 1,300 mg BID    sodium chloride 0.9% flush 3 mL PRN    sodium phosphate 20.01 mmol in dextrose 5 % 250 mL IVPB PRN    sodium phosphate 30 mmol in dextrose 5 % 250 mL IVPB PRN    sodium phosphate 39.99 mmol in dextrose 5 % 250 mL IVPB PRN    sulfamethoxazole-trimethoprim 400-80mg per tablet 1 tablet Every Mon, Wed, Fri    tacrolimus capsule 0.5 mg BID    ursodiol capsule 300 mg BID    [START ON 10/18/2018] valGANciclovir tablet 450 mg Twice Weekly       Objective:     Vital Signs (Most Recent):  Temp: 99.3 °F (37.4 °C) (10/17/18 1553)  Pulse: 94 (10/17/18 1553)  Resp: 15 (10/17/18 1553)  BP: 110/63 (10/17/18 1553)  SpO2: 100 % (10/17/18 1553)  O2 Device (Oxygen Therapy): room air (10/17/18 0330) Vital Signs (24h Range):  Temp:  [37.4 °F (3 °C)-99.3 °F (37.4 °C)] 99.3 °F (37.4 °C)  Pulse:  [] 94  Resp:  [10-16] 15  SpO2:  [96 %-100 %] 100 %  BP: ()/(55-70) 110/63     Weight: 68.3 kg (150 lb 9.2 oz) (10/17/18 0330)  Body mass index is 26.67 kg/m².  Body surface area is 1.74 meters squared.    I/O last 3 completed shifts:  In: 2300 [P.O.:2050; Blood:250]  Out: 3050 [Urine:3050]    Physical Exam  Constitutional: She is oriented to person, place, and time. She appears well-developed.   Temporal and distal extremity muscle wasting; yellowing of skin  Eyes: EOM are normal. Pupils are equal, round, and reactive to light. Scleral icterus is present.   Neck: Normal range of motion.   Cardiovascular: Normal rate and regular rhythm.   No murmur heard.  Pulmonary/Chest: Effort normal and breath sounds normal. No respiratory distress. She has no wheezes.   Abdominal: Soft. Bowel sounds are normal. She exhibits distension. Chevron incision w/ staples, CDI  Musculoskeletal: Normal range of motion. She exhibits edema (BL lower extremities).   Neurological: She is alert and oriented to person, place, and  time.   Skin: Skin is warm and dry.     Significant Labs:  CBC:   Recent Labs   Lab  10/17/18   0600   WBC  12.24   RBC  2.71*   HGB  8.0*   HCT  24.0*   PLT  81*   MCV  89   MCH  29.5   MCHC  33.3     CMP:   Recent Labs   Lab  10/17/18   0600   GLU  181*   CALCIUM  8.2*   ALBUMIN  2.4*   PROT  3.9*   NA  129*   K  3.5   CO2  25   CL  90*   BUN  120*   CREATININE  2.5*   ALKPHOS  151*   ALT  46*   AST  31   BILITOT  5.3*     All labs within the past 24 hours have been reviewed.

## 2018-10-17 NOTE — PROGRESS NOTES
Spoke with MD Wiley regarding blood sugar of 363, ordered to give 5 units of Novolog along with scheduled Levimir.

## 2018-10-17 NOTE — PLAN OF CARE
Problem: Patient Care Overview  Goal: Plan of Care Review  Outcome: Ongoing (interventions implemented as appropriate)  Pt aao x 4. VSS. Bed in low locked p[os, call light within reach. Pt calls for assistance when needed. Pt Chevron dedrick w/ staples, painted w/ betadine. No c/o pain. Pt measures uop, 1 L abbie urine collected.1 BM overnight, stool sample positive for occult blood. Cultures redrawn, WBC inc.Bs monitored ac/hs, scheduled insulin given as ordered. Pt eating  All of meals. Family at bedside, very attentive.

## 2018-10-17 NOTE — TREATMENT PLAN
GI Treatment Plan    Scarlett Reddy is a 68 y.o. female admitted to hospital 10/1/2018 (Hospital Day: 17) due to Liver transplanted.     Plan  - NPO at MN  - EGD in AM  - Plan of care was discussed with primary team.    Thank you for involving us in the care of Scarlett Reddy. Please call with any additional questions, concerns or changes in the patient's clinical status.    Marcelino De La Cruz MD  Gastroenterology Fellow, PGY IV  Pager: [phone]

## 2018-10-17 NOTE — PROGRESS NOTES
[facility]  Liver Transplant  Progress Note    Patient Name: Scarlett Reddy  MRN: [id number]  Admission Date: 10/1/2018  Hospital Length of Stay: 15 days  Code Status: Full Code  Primary Care Provider: Primary Doctor No  Post-Operative Day: 12    ORGAN:   LIVER  Disease Etiology: Cirrhosis: Type B and D  Donor Type:    - Brain Death  CDC High Risk:   No  Donor CMV Status:   Donor CMV Status: Positive  Donor HBcAB:   Negative  Donor HCV Status:   Negative  Donor HBV SETH: Negative  Donor HCV SETH: Negative  Whole or Partial: Whole Liver  Biliary Anastomosis: End to End  Arterial Anatomy: Replaced Left Hepatic and Right Hepatic  Subjective:     History of Present Illness:  Ms. Reddy is a 67 y/o female with PMH of ESLD secondary Hep B and D.  Listed for liver transplant with MELD 23.  Paracentesis 10/1 with 5L removed, no fluid sent for analysis.  Morning labs significant for hyponatremia, Na 119.  Pt admitted to LTS for sodium monitoring.        Hospital Course:  Hospital Course: 67 y/o F h/o HBV/delta cirrhosis s/p DBDLT 10/5/18 (CMV D+/R+, steroid induction, MMF/tacro/pred maintenance) with take back on 10/6 2/2 hyperbilirubinema and bilious VALORIE drainage, no leak identified. Post-op course c/b hypercapneic and hypoxic respiratory failure and was reintubated though quickly extubated now doing well. Liver bx (10/6) sig for Zone 3 hemorrhage and collapse, in addition to cholestasis. Transferred from ICU on POD #4. ID consulted to comment on positive diphtheroid culture from ascitic fluid (likely skin colonization) as well as ESBL Klebsiella pneumoniae in urine culture (10/4).  Pt asymptomatic and cell count from ascitic fluid unremarkable. Per ID recs, no need to treat diphtheroids or asymptomatic ESBL Kleb pneumo bacteriuria though pt has had 6 days of therapy which would cover these organisms. Mild peritransplant ascites- repeat US 10/6 with increased arterial resistive indices. Repeat US 10/9  w/ continued elevation in RIs and fluid collections. LFTs trending down nicely. Acute gradual worsening of renal function s/p OLT.Creatinine on arrival 0.8 and has been up trending. Nephrology consulted for post-op EVA. Remains on renal/low K diet. Patient continued to be diuresed with lasix  mg (10/13) and 60 mg IV BID (10/14). On 10/14, had hyponatremia of 125 and  and underwent CRRT (10/15). Tolerated well. Additionally, pt's Hgb low- transfused total of 2 units pRBC (10/14, 10/16). Pt reported dark stools 10/15 but color has normalized. No blood noted on MORELIA. Still with concern for possible slow GI bleed.     Interval history: Pt feeling a little better this morning. Still having abdominal pains, but eating well and walking around. Cr/BUN  cont to trend up. Talked w/ Nephrology about moving forward w/ CRRT. Nothing scheduled at this time. Will follow-up. Cont to diurese w/ lasix 80 BID at this time.  Concern for slow GI bleed. H/H stable today. Monitoring w/ daily labs. Consulted GI- plan for EGD in the morning. NPO after midnight. WBC trended down today. Urine cx 10/15 positive for E.col. Pt asymptomatic, denies dysuria and hematuria. Sensitivities pending. Started pt on ciprofloxacin (10/17).  LFTs and T bili cont to trend down. Denies N/V. Encouraged pt to increase nutritional intake. Remains on renal/low K diet. No further complaints at this time    Scheduled Meds:   bisacodyl  10 mg Oral QHS    ciprofloxacin HCl  500 mg Oral Daily    docusate sodium  50 mg Oral TID    entecavir  0.5 mg Oral Q72H    ergocalciferol  50,000 Units Oral Q7 Days    fluconazole  200 mg Oral Daily    furosemide  80 mg Intravenous BID    heparin (porcine)  5,000 Units Subcutaneous Q8H    insulin aspart U-100  17 Units Subcutaneous TIDWM    insulin detemir U-100  13 Units Subcutaneous QHS    midodrine  15 mg Oral TID    mirtazapine  7.5 mg Oral QHS    mycophenolate  1,000 mg Oral BID    pantoprazole  40 mg  Intravenous BID    predniSONE  15 mg Oral Daily    Followed by    [START ON 10/24/2018] predniSONE  10 mg Oral Daily    Followed by    [START ON 10/31/2018] predniSONE  5 mg Oral Daily    Followed by    [START ON 11/7/2018] predniSONE  2.5 mg Oral Daily    sodium bicarbonate  1,300 mg Oral BID    sulfamethoxazole-trimethoprim 400-80mg  1 tablet Oral Every Mon, Wed, Fri    tacrolimus  0.5 mg Oral BID    ursodiol  300 mg Oral BID    [START ON 10/18/2018] valGANciclovir  450 mg Oral Twice Weekly     Continuous Infusions:   sodium chloride 0.9%       PRN Meds:bisacodyl, calcium carbonate, dextrose 50%, dextrose 50%, glucagon (human recombinant), glucose, glucose, insulin aspart U-100, insulin aspart U-100, magnesium sulfate IVPB, ondansetron, oxyCODONE, oxyCODONE, polyethylene glycol, promethazine (PHENERGAN) IVPB, sodium chloride 0.9%, sodium phosphate IVPB, sodium phosphate IVPB, sodium phosphate IVPB    Review of Systems   Constitutional: Positive for activity change, appetite change and fatigue. Negative for chills and fever.   Respiratory: Negative for cough and shortness of breath.    Cardiovascular: Positive for leg swelling. Negative for chest pain.   Gastrointestinal: Positive for abdominal distention, abdominal pain and nausea. Negative for blood in stool, constipation, diarrhea and vomiting.   Genitourinary: Negative for decreased urine volume, difficulty urinating and dysuria.   Musculoskeletal: Negative for arthralgias and back pain.   Allergic/Immunologic: Positive for immunocompromised state.   Neurological: Positive for weakness. Negative for dizziness, tremors and headaches.   Psychiatric/Behavioral: Negative for confusion. The patient is not nervous/anxious.      Objective:     Vital Signs (Most Recent):  Temp: 98.3 °F (36.8 °C) (10/17/18 1213)  Pulse: 98 (10/17/18 1125)  Resp: 10 (10/17/18 0815)  BP: 111/60 (10/17/18 0815)  SpO2: 96 % (10/17/18 0815) Vital Signs (24h Range):  Temp:  [97.9 °F  (36.6 °C)-98.6 °F (37 °C)] 98.3 °F (36.8 °C)  Pulse:  [] 98  Resp:  [10-15] 10  SpO2:  [96 %-100 %] 96 %  BP: ()/(55-71) 111/60     Weight: 68.3 kg (150 lb 9.2 oz)  Body mass index is 26.67 kg/m².    Intake/Output - Last 3 Shifts       10/15 0700 - 10/16 0659 10/16 0700 - 10/17 0659 10/17 0700 - 10/18 0659    P.O. 1080 1690 420    I.V. (mL/kg)   0 (0)    Blood  250     Other   0    Total Intake(mL/kg) 1080 (15.4) 1940 (28.4) 420 (6.1)    Urine (mL/kg/hr) 925 (0.6) 2200 (1.3)     Other       Stool 0 0     Total Output 925 2200     Net +155 -260 +420           Stool Occurrence 0 x 1 x           Physical Exam   Constitutional: She is oriented to person, place, and time.   Temporal and distal extremity muscle wasting   Eyes: EOM are normal. Pupils are equal, round, and reactive to light. Scleral icterus is present.   Neck: Normal range of motion.   Cardiovascular: Normal rate and regular rhythm.   No murmur heard.  Pulmonary/Chest: Effort normal and breath sounds normal. No respiratory distress. She has no wheezes.   Abdominal: Soft. Bowel sounds are normal. She exhibits distension. There is tenderness.   Chevron incision w/ staples-- clean, intact, minimal fluid leak   Musculoskeletal: Normal range of motion. She exhibits edema (Bl lower extremities).   Neurological: She is alert and oriented to person, place, and time.   Skin: Skin is warm and dry.   Nursing note and vitals reviewed.      Laboratory:  Immunosuppressants         Stop Route Frequency     tacrolimus capsule 0.5 mg      -- Oral 2 times daily     mycophenolate capsule 1,000 mg      -- Oral 2 times daily        CBC:   Recent Labs   Lab  10/17/18   0600   WBC  12.24   RBC  2.71*   HGB  8.0*   HCT  24.0*   PLT  81*   MCV  89   MCH  29.5   MCHC  33.3     CMP:   Recent Labs   Lab  10/17/18   0600   GLU  181*   CALCIUM  8.2*   ALBUMIN  2.4*   PROT  3.9*   NA  129*   K  3.5   CO2  25   CL  90*   BUN  120*   CREATININE  2.5*   ALKPHOS  151*   ALT  46*    AST  31   BILITOT  5.3*     Labs within the past 24 hours have been reviewed.    Diagnostic Results:  I have personally reviewed all pertinent imaging studies.    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   -POD 1 US: increased arterial resistive indices, suggestive of edema vs cute rejection vs congestion  -Repeat US 10/9 with continued elevation of RIs  -LFTs trending down  -T bili trending down  -repeat liver US (10/11): elevated RIs  - LFTs trending down.              UTI (urinary tract infection)    Urine Cx 10/15 w/ E. Col  Sensitivities pending  Started Ciprofloxacin 10/17  Pt asymptomatic at this time          Acute blood loss anemia    Patient has been transfused total 2 pRBC (10/14. 10/16)  Possible concern of GI bleed in setting of post transplant, and complaints of black stool.  - FOBT pending               Post LT - Acute renal failure    - Creatinine on arrival 0.8 and has been up trending   BUN/cr (10/11/18): 91/1.9   Tacrolimus 26.9 (10/08)  Nephrology consulted  Renal US (10/11): no hydronephrosis  Echo 10/12: diastolic dysfunction, likely 2/2 to ARF  - pt on renal diet w 1500 ml FR.  Concern for uremia- plan for CRRT        Hypervolemia associated with renal insufficiency    - Diuresed w/ Lasix 100mg x 1 (10/12).  - CRRT 10/14.  .    -Diuresing w/ Lasix 80 BID          EVA (acute kidney injury)              Metabolic acidosis    - Started Na bicarb 1300 QID.  -CRRT 10/14            At risk for opportunistic infections    - Valcyte for CMV prophylaxis --> will start at discharge or  POD #10 in hospital  - Bactrim for PCP prophylaxis (MWF)  - Fluconazole for fungal prophylaxis           Long-term use of immunosuppressant medication    -  Cellcept/Prograf/pred  -  Held prograf 10/8 for elevated levels  -  Resumed Prograf  10/10/19 at dose of 0.5 mg.  Will adjust as needed.   -  Will monitor for signs of toxic side effects, check daily tacrolimus troughs, and change meds accordingly.          Prophylactic immunotherapy    - See long term use immunosuppresion          Ascites    - paracentesis 10/1, 5L removed, no fluid sent for analysis.  - diuresing with albumin and lasix 10/9.  - Per Nephrology, on 10/13- Lasix 80 mg bid and monitor.  No need for RRT.  -  10/14, pt w worsening edema.  , .  Nephr plan for CRRT this evening.           Weakness    - see physical decond.          Physical deconditioning    - PT/OT consulted.  - Recommend home health PT and rolling walker at discharge           Hyponatremia    - Na 119 on admit.  -Na was improving post transplant w HD.  - Pt POD #9 w EVA, Na worse today at 125.  Changed FR to 1L.  - Na stable post CRRT  -Na trending down. Monitor                Hypotension    - continue Midodrine 15 mg TID.   Monitor.          Anemia requiring transfusions    - pt w decreased H/H.  Transfuse total 1 u PRBC 10/14 and 10/16  - pt c/o right side pain and dark stools  -FOBT pending  -LDH elevated, Hapto < 10  -Plan for EGD in the morning  -cont to monitor           Severe malnutrition    - dietary consulted.   - temporal and distal extremity muscle wasting and edema  - encourage supplements and to increase nutritional intake   - pt on renal/low K+ diet        Type 2 diabetes mellitus with diabetic polyneuropathy, with long-term current use of insulin    - continue home regimen.  - endocrine consulted.  -Pt off insulin drip at this time.  Apprec endo recs.           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  -Tx w/ Entecavir- dose changed to q72 hours given renal function.                VTE Risk Mitigation (From admission, onward)        Ordered     Place sequential compression device  Until discontinued      10/05/18 0709     heparin (porcine) injection  5,000 Units  Every 8 hours      10/01/18 6159          The patients clinical status was discussed at multidisplinary rounds, involving transplant surgery, transplant medicine, pharmacy, nursing, nutrition, and social work    Discharge Planning:  No Patient Care Coordination Note on file.      Jihan Soares PA-C  Liver Transplant  [facility]

## 2018-10-17 NOTE — ASSESSMENT & PLAN NOTE
Urine Cx 10/15 w/ E. Col  Sensitivities pending  Started Ciprofloxacin 10/17  Pt asymptomatic at this time

## 2018-10-17 NOTE — SUBJECTIVE & OBJECTIVE
Scheduled Meds:   bisacodyl  10 mg Oral QHS    ciprofloxacin HCl  500 mg Oral Daily    docusate sodium  50 mg Oral TID    entecavir  0.5 mg Oral Q72H    ergocalciferol  50,000 Units Oral Q7 Days    fluconazole  200 mg Oral Daily    furosemide  80 mg Intravenous BID    heparin (porcine)  5,000 Units Subcutaneous Q8H    insulin aspart U-100  17 Units Subcutaneous TIDWM    insulin detemir U-100  13 Units Subcutaneous QHS    midodrine  15 mg Oral TID    mirtazapine  7.5 mg Oral QHS    mycophenolate  1,000 mg Oral BID    pantoprazole  40 mg Intravenous BID    predniSONE  15 mg Oral Daily    Followed by    [START ON 10/24/2018] predniSONE  10 mg Oral Daily    Followed by    [START ON 10/31/2018] predniSONE  5 mg Oral Daily    Followed by    [START ON 11/7/2018] predniSONE  2.5 mg Oral Daily    sodium bicarbonate  1,300 mg Oral BID    sulfamethoxazole-trimethoprim 400-80mg  1 tablet Oral Every Mon, Wed, Fri    tacrolimus  0.5 mg Oral BID    ursodiol  300 mg Oral BID    [START ON 10/18/2018] valGANciclovir  450 mg Oral Twice Weekly     Continuous Infusions:   sodium chloride 0.9%       PRN Meds:bisacodyl, calcium carbonate, dextrose 50%, dextrose 50%, glucagon (human recombinant), glucose, glucose, insulin aspart U-100, insulin aspart U-100, magnesium sulfate IVPB, ondansetron, oxyCODONE, oxyCODONE, polyethylene glycol, promethazine (PHENERGAN) IVPB, sodium chloride 0.9%, sodium phosphate IVPB, sodium phosphate IVPB, sodium phosphate IVPB    Review of Systems   Constitutional: Positive for activity change, appetite change and fatigue. Negative for chills and fever.   Respiratory: Negative for cough and shortness of breath.    Cardiovascular: Positive for leg swelling. Negative for chest pain.   Gastrointestinal: Positive for abdominal distention, abdominal pain and nausea. Negative for blood in stool, constipation, diarrhea and vomiting.   Genitourinary: Negative for decreased urine volume,  difficulty urinating and dysuria.   Musculoskeletal: Negative for arthralgias and back pain.   Allergic/Immunologic: Positive for immunocompromised state.   Neurological: Positive for weakness. Negative for dizziness, tremors and headaches.   Psychiatric/Behavioral: Negative for confusion. The patient is not nervous/anxious.      Objective:     Vital Signs (Most Recent):  Temp: 98.3 °F (36.8 °C) (10/17/18 1213)  Pulse: 98 (10/17/18 1125)  Resp: 10 (10/17/18 0815)  BP: 111/60 (10/17/18 0815)  SpO2: 96 % (10/17/18 0815) Vital Signs (24h Range):  Temp:  [97.9 °F (36.6 °C)-98.6 °F (37 °C)] 98.3 °F (36.8 °C)  Pulse:  [] 98  Resp:  [10-15] 10  SpO2:  [96 %-100 %] 96 %  BP: ()/(55-71) 111/60     Weight: 68.3 kg (150 lb 9.2 oz)  Body mass index is 26.67 kg/m².    Intake/Output - Last 3 Shifts       10/15 0700 - 10/16 0659 10/16 0700 - 10/17 0659 10/17 0700 - 10/18 0659    P.O. 1080 1690 420    I.V. (mL/kg)   0 (0)    Blood  250     Other   0    Total Intake(mL/kg) 1080 (15.4) 1940 (28.4) 420 (6.1)    Urine (mL/kg/hr) 925 (0.6) 2200 (1.3)     Other       Stool 0 0     Total Output 925 2200     Net +155 -260 +420           Stool Occurrence 0 x 1 x           Physical Exam   Constitutional: She is oriented to person, place, and time.   Temporal and distal extremity muscle wasting   Eyes: EOM are normal. Pupils are equal, round, and reactive to light. Scleral icterus is present.   Neck: Normal range of motion.   Cardiovascular: Normal rate and regular rhythm.   No murmur heard.  Pulmonary/Chest: Effort normal and breath sounds normal. No respiratory distress. She has no wheezes.   Abdominal: Soft. Bowel sounds are normal. She exhibits distension. There is tenderness.   Chevron incision w/ staples-- clean, intact, minimal fluid leak   Musculoskeletal: Normal range of motion. She exhibits edema (Bl lower extremities).   Neurological: She is alert and oriented to person, place, and time.   Skin: Skin is warm and dry.    Nursing note and vitals reviewed.      Laboratory:  Immunosuppressants         Stop Route Frequency     tacrolimus capsule 0.5 mg      -- Oral 2 times daily     mycophenolate capsule 1,000 mg      -- Oral 2 times daily        CBC:   Recent Labs   Lab  10/17/18   0600   WBC  12.24   RBC  2.71*   HGB  8.0*   HCT  24.0*   PLT  81*   MCV  89   MCH  29.5   MCHC  33.3     CMP:   Recent Labs   Lab  10/17/18   0600   GLU  181*   CALCIUM  8.2*   ALBUMIN  2.4*   PROT  3.9*   NA  129*   K  3.5   CO2  25   CL  90*   BUN  120*   CREATININE  2.5*   ALKPHOS  151*   ALT  46*   AST  31   BILITOT  5.3*     Labs within the past 24 hours have been reviewed.    Diagnostic Results:  I have personally reviewed all pertinent imaging studies.

## 2018-10-18 ENCOUNTER — ANESTHESIA (OUTPATIENT)
Dept: ENDOSCOPY | Facility: HOSPITAL | Age: 68
DRG: 005 | End: 2018-10-18
Payer: COMMERCIAL

## 2018-10-18 LAB
ABO + RH BLD: NORMAL
ALBUMIN SERPL BCP-MCNC: 2.2 G/DL
ALBUMIN SERPL BCP-MCNC: 2.3 G/DL
ALP SERPL-CCNC: 122 U/L
ALT SERPL W/O P-5'-P-CCNC: 43 U/L
ANION GAP SERPL CALC-SCNC: 10 MMOL/L
ANION GAP SERPL CALC-SCNC: 9 MMOL/L
APTT BLDCRRT: 25.9 SEC
AST SERPL-CCNC: 37 U/L
BACTERIA UR CULT: NORMAL
BACTERIA UR CULT: NORMAL
BASOPHILS # BLD AUTO: 0.01 K/UL
BASOPHILS NFR BLD: 0.1 %
BILIRUB SERPL-MCNC: 5.4 MG/DL
BLD GP AB SCN CELLS X3 SERPL QL: NORMAL
BLD PROD TYP BPU: NORMAL
BLOOD UNIT EXPIRATION DATE: NORMAL
BLOOD UNIT TYPE CODE: 6200
BLOOD UNIT TYPE CODE: 6200
BLOOD UNIT TYPE CODE: 7300
BLOOD UNIT TYPE: NORMAL
BUN SERPL-MCNC: 62 MG/DL
BUN SERPL-MCNC: 62 MG/DL
BUN SERPL-MCNC: 69 MG/DL
BUN SERPL-MCNC: 81 MG/DL
CALCIUM SERPL-MCNC: 7.5 MG/DL
CALCIUM SERPL-MCNC: 7.5 MG/DL
CALCIUM SERPL-MCNC: 7.7 MG/DL
CALCIUM SERPL-MCNC: 7.7 MG/DL
CHLORIDE SERPL-SCNC: 95 MMOL/L
CHLORIDE SERPL-SCNC: 96 MMOL/L
CHLORIDE SERPL-SCNC: 97 MMOL/L
CHLORIDE SERPL-SCNC: 97 MMOL/L
CO2 SERPL-SCNC: 24 MMOL/L
CO2 SERPL-SCNC: 24 MMOL/L
CO2 SERPL-SCNC: 25 MMOL/L
CO2 SERPL-SCNC: 26 MMOL/L
CODING SYSTEM: NORMAL
CREAT SERPL-MCNC: 1.3 MG/DL
CREAT SERPL-MCNC: 1.3 MG/DL
CREAT SERPL-MCNC: 1.5 MG/DL
CREAT SERPL-MCNC: 1.6 MG/DL
DIFFERENTIAL METHOD: ABNORMAL
DISPENSE STATUS: NORMAL
EOSINOPHIL # BLD AUTO: 0 K/UL
EOSINOPHIL # BLD AUTO: 0 K/UL
EOSINOPHIL # BLD AUTO: 0.1 K/UL
EOSINOPHIL NFR BLD: 0.1 %
EOSINOPHIL NFR BLD: 0.1 %
EOSINOPHIL NFR BLD: 0.6 %
ERYTHROCYTE [DISTWIDTH] IN BLOOD BY AUTOMATED COUNT: 15.7 %
ERYTHROCYTE [DISTWIDTH] IN BLOOD BY AUTOMATED COUNT: 16.9 %
ERYTHROCYTE [DISTWIDTH] IN BLOOD BY AUTOMATED COUNT: 17.1 %
EST. GFR  (AFRICAN AMERICAN): 37.6 ML/MIN/1.73 M^2
EST. GFR  (AFRICAN AMERICAN): 40.7 ML/MIN/1.73 M^2
EST. GFR  (AFRICAN AMERICAN): 48.4 ML/MIN/1.73 M^2
EST. GFR  (AFRICAN AMERICAN): 48.4 ML/MIN/1.73 M^2
EST. GFR  (NON AFRICAN AMERICAN): 32.6 ML/MIN/1.73 M^2
EST. GFR  (NON AFRICAN AMERICAN): 35.3 ML/MIN/1.73 M^2
EST. GFR  (NON AFRICAN AMERICAN): 42 ML/MIN/1.73 M^2
EST. GFR  (NON AFRICAN AMERICAN): 42 ML/MIN/1.73 M^2
GLUCOSE SERPL-MCNC: 161 MG/DL
GLUCOSE SERPL-MCNC: 57 MG/DL
GLUCOSE SERPL-MCNC: 57 MG/DL
GLUCOSE SERPL-MCNC: 90 MG/DL
HCT VFR BLD AUTO: 19.3 %
HCT VFR BLD AUTO: 19.3 %
HCT VFR BLD AUTO: 26 %
HCT VFR BLD AUTO: 26.8 %
HGB BLD-MCNC: 6.4 G/DL
HGB BLD-MCNC: 6.4 G/DL
HGB BLD-MCNC: 8.5 G/DL
HGB BLD-MCNC: 9.1 G/DL
IMM GRANULOCYTES # BLD AUTO: 0.09 K/UL
IMM GRANULOCYTES # BLD AUTO: 0.13 K/UL
IMM GRANULOCYTES # BLD AUTO: 0.14 K/UL
IMM GRANULOCYTES NFR BLD AUTO: 1 %
IMM GRANULOCYTES NFR BLD AUTO: 1.1 %
IMM GRANULOCYTES NFR BLD AUTO: 1.4 %
INR PPP: 1
LYMPHOCYTES # BLD AUTO: 0.1 K/UL
LYMPHOCYTES # BLD AUTO: 0.2 K/UL
LYMPHOCYTES # BLD AUTO: 0.3 K/UL
LYMPHOCYTES NFR BLD: 1 %
LYMPHOCYTES NFR BLD: 2.1 %
LYMPHOCYTES NFR BLD: 3.7 %
MAGNESIUM SERPL-MCNC: 1.3 MG/DL
MAGNESIUM SERPL-MCNC: 1.5 MG/DL
MCH RBC QN AUTO: 29.5 PG
MCH RBC QN AUTO: 30.2 PG
MCH RBC QN AUTO: 30.2 PG
MCHC RBC AUTO-ENTMCNC: 32.7 G/DL
MCHC RBC AUTO-ENTMCNC: 33.2 G/DL
MCHC RBC AUTO-ENTMCNC: 34 G/DL
MCV RBC AUTO: 89 FL
MCV RBC AUTO: 90 FL
MCV RBC AUTO: 91 FL
MONOCYTES # BLD AUTO: 0.2 K/UL
MONOCYTES # BLD AUTO: 0.5 K/UL
MONOCYTES # BLD AUTO: 0.5 K/UL
MONOCYTES NFR BLD: 1.9 %
MONOCYTES NFR BLD: 4.9 %
MONOCYTES NFR BLD: 5.6 %
NEUTROPHILS # BLD AUTO: 11.9 K/UL
NEUTROPHILS # BLD AUTO: 7.8 K/UL
NEUTROPHILS # BLD AUTO: 8.4 K/UL
NEUTROPHILS NFR BLD: 89 %
NEUTROPHILS NFR BLD: 91.4 %
NEUTROPHILS NFR BLD: 95.8 %
NRBC BLD-RTO: 0 /100 WBC
PHOSPHATE SERPL-MCNC: 2.3 MG/DL
PHOSPHATE SERPL-MCNC: 2.3 MG/DL
PHOSPHATE SERPL-MCNC: 2.6 MG/DL
PHOSPHATE SERPL-MCNC: 3.3 MG/DL
PLATELET # BLD AUTO: 46 K/UL
PLATELET # BLD AUTO: 56 K/UL
PLATELET # BLD AUTO: 83 K/UL
PMV BLD AUTO: 11.6 FL
PMV BLD AUTO: 11.7 FL
PMV BLD AUTO: 12.5 FL
POCT GLUCOSE: 110 MG/DL (ref 70–110)
POCT GLUCOSE: 140 MG/DL (ref 70–110)
POCT GLUCOSE: 148 MG/DL (ref 70–110)
POCT GLUCOSE: 153 MG/DL (ref 70–110)
POCT GLUCOSE: 49 MG/DL (ref 70–110)
POCT GLUCOSE: 61 MG/DL (ref 70–110)
POCT GLUCOSE: 79 MG/DL (ref 70–110)
POTASSIUM SERPL-SCNC: 3.6 MMOL/L
POTASSIUM SERPL-SCNC: 3.6 MMOL/L
POTASSIUM SERPL-SCNC: 3.7 MMOL/L
POTASSIUM SERPL-SCNC: 3.7 MMOL/L
PROT SERPL-MCNC: 3.7 G/DL
PROTHROMBIN TIME: 10.7 SEC
RBC # BLD AUTO: 2.12 M/UL
RBC # BLD AUTO: 2.88 M/UL
RBC # BLD AUTO: 3.01 M/UL
SODIUM SERPL-SCNC: 130 MMOL/L
SODIUM SERPL-SCNC: 131 MMOL/L
TACROLIMUS BLD-MCNC: 4.4 NG/ML
TRANS ERYTHROCYTES VOL PATIENT: NORMAL ML
TRANS ERYTHROCYTES VOL PATIENT: NORMAL ML
TRANS PLATPHERESIS VOL PATIENT: NORMAL ML
WBC # BLD AUTO: 12.37 K/UL
WBC # BLD AUTO: 8.76 K/UL
WBC # BLD AUTO: 9.23 K/UL

## 2018-10-18 PROCEDURE — 43239 EGD BIOPSY SINGLE/MULTIPLE: CPT | Mod: ,,, | Performed by: INTERNAL MEDICINE

## 2018-10-18 PROCEDURE — 85018 HEMOGLOBIN: CPT

## 2018-10-18 PROCEDURE — 80069 RENAL FUNCTION PANEL: CPT

## 2018-10-18 PROCEDURE — 37000008 HC ANESTHESIA 1ST 15 MINUTES: Performed by: INTERNAL MEDICINE

## 2018-10-18 PROCEDURE — 63600175 PHARM REV CODE 636 W HCPCS: Mod: JG | Performed by: PHYSICIAN ASSISTANT

## 2018-10-18 PROCEDURE — 63600175 PHARM REV CODE 636 W HCPCS: Performed by: NURSE PRACTITIONER

## 2018-10-18 PROCEDURE — 27201012 HC FORCEPS, HOT/COLD, DISP: Performed by: INTERNAL MEDICINE

## 2018-10-18 PROCEDURE — 43239 EGD BIOPSY SINGLE/MULTIPLE: CPT | Performed by: INTERNAL MEDICINE

## 2018-10-18 PROCEDURE — 25000003 PHARM REV CODE 250: Performed by: NURSE PRACTITIONER

## 2018-10-18 PROCEDURE — 63600175 PHARM REV CODE 636 W HCPCS: Performed by: PHYSICIAN ASSISTANT

## 2018-10-18 PROCEDURE — 63600175 PHARM REV CODE 636 W HCPCS: Performed by: STUDENT IN AN ORGANIZED HEALTH CARE EDUCATION/TRAINING PROGRAM

## 2018-10-18 PROCEDURE — 37000009 HC ANESTHESIA EA ADD 15 MINS: Performed by: INTERNAL MEDICINE

## 2018-10-18 PROCEDURE — 99900035 HC TECH TIME PER 15 MIN (STAT)

## 2018-10-18 PROCEDURE — 25000003 PHARM REV CODE 250: Performed by: TRANSPLANT SURGERY

## 2018-10-18 PROCEDURE — 63600175 PHARM REV CODE 636 W HCPCS: Performed by: NURSE ANESTHETIST, CERTIFIED REGISTERED

## 2018-10-18 PROCEDURE — 86901 BLOOD TYPING SEROLOGIC RH(D): CPT

## 2018-10-18 PROCEDURE — 43255 EGD CONTROL BLEEDING ANY: CPT | Mod: 59,,, | Performed by: INTERNAL MEDICINE

## 2018-10-18 PROCEDURE — 83735 ASSAY OF MAGNESIUM: CPT | Mod: 91

## 2018-10-18 PROCEDURE — 88305 TISSUE EXAM BY PATHOLOGIST: CPT | Performed by: PATHOLOGY

## 2018-10-18 PROCEDURE — 20000000 HC ICU ROOM

## 2018-10-18 PROCEDURE — 25000003 PHARM REV CODE 250: Performed by: PHYSICIAN ASSISTANT

## 2018-10-18 PROCEDURE — 88342 IMHCHEM/IMCYTCHM 1ST ANTB: CPT | Mod: 26,,, | Performed by: PATHOLOGY

## 2018-10-18 PROCEDURE — 84100 ASSAY OF PHOSPHORUS: CPT

## 2018-10-18 PROCEDURE — 27202087 HC PROBE, APC: Performed by: INTERNAL MEDICINE

## 2018-10-18 PROCEDURE — 20600001 HC STEP DOWN PRIVATE ROOM

## 2018-10-18 PROCEDURE — 86920 COMPATIBILITY TEST SPIN: CPT

## 2018-10-18 PROCEDURE — 99232 SBSQ HOSP IP/OBS MODERATE 35: CPT | Mod: ,,, | Performed by: NURSE PRACTITIONER

## 2018-10-18 PROCEDURE — 85610 PROTHROMBIN TIME: CPT

## 2018-10-18 PROCEDURE — 0DB98ZX EXCISION OF DUODENUM, VIA NATURAL OR ARTIFICIAL OPENING ENDOSCOPIC, DIAGNOSTIC: ICD-10-PCS | Performed by: INTERNAL MEDICINE

## 2018-10-18 PROCEDURE — 85025 COMPLETE CBC W/AUTO DIFF WBC: CPT | Mod: 91

## 2018-10-18 PROCEDURE — 88342 IMHCHEM/IMCYTCHM 1ST ANTB: CPT | Performed by: PATHOLOGY

## 2018-10-18 PROCEDURE — 99233 SBSQ HOSP IP/OBS HIGH 50: CPT | Mod: 24,,, | Performed by: PHYSICIAN ASSISTANT

## 2018-10-18 PROCEDURE — 82962 GLUCOSE BLOOD TEST: CPT | Performed by: INTERNAL MEDICINE

## 2018-10-18 PROCEDURE — D9220A PRA ANESTHESIA: Mod: CRNA,,, | Performed by: NURSE ANESTHETIST, CERTIFIED REGISTERED

## 2018-10-18 PROCEDURE — P9035 PLATELET PHERES LEUKOREDUCED: HCPCS

## 2018-10-18 PROCEDURE — 83735 ASSAY OF MAGNESIUM: CPT

## 2018-10-18 PROCEDURE — D9220A PRA ANESTHESIA: Mod: ANES,,, | Performed by: ANESTHESIOLOGY

## 2018-10-18 PROCEDURE — 94664 DEMO&/EVAL PT USE INHALER: CPT

## 2018-10-18 PROCEDURE — 88305 TISSUE EXAM BY PATHOLOGIST: CPT | Mod: 26,,, | Performed by: PATHOLOGY

## 2018-10-18 PROCEDURE — 43255 EGD CONTROL BLEEDING ANY: CPT | Performed by: INTERNAL MEDICINE

## 2018-10-18 PROCEDURE — 80053 COMPREHEN METABOLIC PANEL: CPT

## 2018-10-18 PROCEDURE — 85014 HEMATOCRIT: CPT

## 2018-10-18 PROCEDURE — P9021 RED BLOOD CELLS UNIT: HCPCS

## 2018-10-18 PROCEDURE — C9113 INJ PANTOPRAZOLE SODIUM, VIA: HCPCS | Performed by: PHYSICIAN ASSISTANT

## 2018-10-18 PROCEDURE — 80197 ASSAY OF TACROLIMUS: CPT

## 2018-10-18 PROCEDURE — 99232 SBSQ HOSP IP/OBS MODERATE 35: CPT | Mod: ,,, | Performed by: INTERNAL MEDICINE

## 2018-10-18 PROCEDURE — 25000003 PHARM REV CODE 250: Performed by: NURSE ANESTHETIST, CERTIFIED REGISTERED

## 2018-10-18 PROCEDURE — 85730 THROMBOPLASTIN TIME PARTIAL: CPT

## 2018-10-18 RX ORDER — PROPOFOL 10 MG/ML
VIAL (ML) INTRAVENOUS CONTINUOUS PRN
Status: DISCONTINUED | OUTPATIENT
Start: 2018-10-18 | End: 2018-10-18

## 2018-10-18 RX ORDER — PHENYLEPHRINE HYDROCHLORIDE 10 MG/ML
INJECTION INTRAVENOUS
Status: DISCONTINUED | OUTPATIENT
Start: 2018-10-18 | End: 2018-10-18

## 2018-10-18 RX ORDER — HYDROCODONE BITARTRATE AND ACETAMINOPHEN 500; 5 MG/1; MG/1
TABLET ORAL
Status: DISCONTINUED | OUTPATIENT
Start: 2018-10-18 | End: 2018-10-19

## 2018-10-18 RX ORDER — PROPOFOL 10 MG/ML
VIAL (ML) INTRAVENOUS
Status: DISCONTINUED | OUTPATIENT
Start: 2018-10-18 | End: 2018-10-18

## 2018-10-18 RX ORDER — LANOLIN ALCOHOL/MO/W.PET/CERES
400 CREAM (GRAM) TOPICAL ONCE
Status: COMPLETED | OUTPATIENT
Start: 2018-10-18 | End: 2018-10-18

## 2018-10-18 RX ORDER — SODIUM CHLORIDE 9 MG/ML
INJECTION, SOLUTION INTRAVENOUS CONTINUOUS PRN
Status: DISCONTINUED | OUTPATIENT
Start: 2018-10-18 | End: 2018-10-18

## 2018-10-18 RX ORDER — TACROLIMUS 1 MG/1
1 CAPSULE ORAL 2 TIMES DAILY
Status: DISCONTINUED | OUTPATIENT
Start: 2018-10-18 | End: 2018-10-22

## 2018-10-18 RX ORDER — LIDOCAINE HCL/PF 100 MG/5ML
SYRINGE (ML) INTRAVENOUS
Status: DISCONTINUED | OUTPATIENT
Start: 2018-10-18 | End: 2018-10-18

## 2018-10-18 RX ORDER — INSULIN ASPART 100 [IU]/ML
15 INJECTION, SOLUTION INTRAVENOUS; SUBCUTANEOUS
Status: DISCONTINUED | OUTPATIENT
Start: 2018-10-18 | End: 2018-10-19

## 2018-10-18 RX ORDER — HEPARIN SODIUM 5000 [USP'U]/ML
5000 INJECTION, SOLUTION INTRAVENOUS; SUBCUTANEOUS EVERY 8 HOURS
Status: DISCONTINUED | OUTPATIENT
Start: 2018-10-18 | End: 2018-10-18

## 2018-10-18 RX ADMIN — URSODIOL 300 MG: 300 CAPSULE ORAL at 09:10

## 2018-10-18 RX ADMIN — FUROSEMIDE 80 MG: 10 INJECTION, SOLUTION INTRAMUSCULAR; INTRAVENOUS at 08:10

## 2018-10-18 RX ADMIN — ONDANSETRON 8 MG: 8 TABLET, ORALLY DISINTEGRATING ORAL at 04:10

## 2018-10-18 RX ADMIN — LIDOCAINE HYDROCHLORIDE 50 MG: 20 INJECTION, SOLUTION INTRAVENOUS at 01:10

## 2018-10-18 RX ADMIN — FLUCONAZOLE 200 MG: 200 TABLET ORAL at 08:10

## 2018-10-18 RX ADMIN — TACROLIMUS 1 MG: 1 CAPSULE ORAL at 06:10

## 2018-10-18 RX ADMIN — MYCOPHENOLATE MOFETIL 1000 MG: 250 CAPSULE ORAL at 08:10

## 2018-10-18 RX ADMIN — DESMOPRESSIN ACETATE 21.39 MCG: 4 SOLUTION INTRAVENOUS at 12:10

## 2018-10-18 RX ADMIN — CIPROFLOXACIN 500 MG: 500 TABLET, FILM COATED ORAL at 08:10

## 2018-10-18 RX ADMIN — PROPOFOL 30 MG: 10 INJECTION, EMULSION INTRAVENOUS at 01:10

## 2018-10-18 RX ADMIN — BISACODYL 10 MG: 5 TABLET, COATED ORAL at 09:10

## 2018-10-18 RX ADMIN — MIDODRINE HYDROCHLORIDE 15 MG: 5 TABLET ORAL at 08:10

## 2018-10-18 RX ADMIN — ENTECAVIR 0.5 MG: 0.5 TABLET ORAL at 09:10

## 2018-10-18 RX ADMIN — HEPATITIS B IMMUNE GLOBULIN (HUMAN) 9999.91 UNITS: 312 INJECTION, SOLUTION INTRAMUSCULAR; INTRAVENOUS at 06:10

## 2018-10-18 RX ADMIN — TACROLIMUS 0.5 MG: 0.5 CAPSULE ORAL at 08:10

## 2018-10-18 RX ADMIN — MIRTAZAPINE 7.5 MG: 7.5 TABLET ORAL at 09:10

## 2018-10-18 RX ADMIN — SODIUM CHLORIDE: 9 INJECTION, SOLUTION INTRAVENOUS at 01:10

## 2018-10-18 RX ADMIN — PHENYLEPHRINE HYDROCHLORIDE 100 MCG: 10 INJECTION INTRAVENOUS at 01:10

## 2018-10-18 RX ADMIN — MIDODRINE HYDROCHLORIDE 15 MG: 5 TABLET ORAL at 03:10

## 2018-10-18 RX ADMIN — INSULIN ASPART 15 UNITS: 100 INJECTION, SOLUTION INTRAVENOUS; SUBCUTANEOUS at 08:10

## 2018-10-18 RX ADMIN — PANTOPRAZOLE SODIUM 40 MG: 40 INJECTION, POWDER, FOR SOLUTION INTRAVENOUS at 09:10

## 2018-10-18 RX ADMIN — FUROSEMIDE 80 MG: 10 INJECTION, SOLUTION INTRAMUSCULAR; INTRAVENOUS at 06:10

## 2018-10-18 RX ADMIN — SODIUM BICARBONATE 650 MG TABLET 1300 MG: at 09:10

## 2018-10-18 RX ADMIN — PANTOPRAZOLE SODIUM 40 MG: 40 INJECTION, POWDER, FOR SOLUTION INTRAVENOUS at 08:10

## 2018-10-18 RX ADMIN — PREDNISONE 15 MG: 5 TABLET ORAL at 08:10

## 2018-10-18 RX ADMIN — SODIUM BICARBONATE 650 MG TABLET 1300 MG: at 08:10

## 2018-10-18 RX ADMIN — VALGANCICLOVIR 450 MG: 450 TABLET, FILM COATED ORAL at 08:10

## 2018-10-18 RX ADMIN — MIDODRINE HYDROCHLORIDE 15 MG: 5 TABLET ORAL at 09:10

## 2018-10-18 RX ADMIN — URSODIOL 300 MG: 300 CAPSULE ORAL at 08:10

## 2018-10-18 RX ADMIN — INSULIN ASPART 15 UNITS: 100 INJECTION, SOLUTION INTRAVENOUS; SUBCUTANEOUS at 03:10

## 2018-10-18 RX ADMIN — ONDANSETRON 8 MG: 8 TABLET, ORALLY DISINTEGRATING ORAL at 11:10

## 2018-10-18 RX ADMIN — DOCUSATE SODIUM 50 MG: 50 CAPSULE, LIQUID FILLED ORAL at 08:10

## 2018-10-18 RX ADMIN — DEXTROSE MONOHYDRATE 12.5 G: 500 INJECTION PARENTERAL at 08:10

## 2018-10-18 RX ADMIN — PROPOFOL 50 MG: 10 INJECTION, EMULSION INTRAVENOUS at 01:10

## 2018-10-18 RX ADMIN — MYCOPHENOLATE MOFETIL 1000 MG: 250 CAPSULE ORAL at 09:10

## 2018-10-18 RX ADMIN — PROPOFOL 100 MCG/KG/MIN: 10 INJECTION, EMULSION INTRAVENOUS at 01:10

## 2018-10-18 RX ADMIN — MAGNESIUM OXIDE TAB 400 MG (241.3 MG ELEMENTAL MG) 400 MG: 400 (241.3 MG) TAB at 09:10

## 2018-10-18 RX ADMIN — DOCUSATE SODIUM 50 MG: 50 CAPSULE, LIQUID FILLED ORAL at 09:10

## 2018-10-18 NOTE — ASSESSMENT & PLAN NOTE
Urine Cx 10/15 w/ Klebsiella pneumonia  Started Ciprofloxacin 10/17  Pt asymptomatic at this time

## 2018-10-18 NOTE — PROGRESS NOTES
Attempted to notify family, unable to locate. Went to waiting room twice, went to endo. No family found, no phone number available.

## 2018-10-18 NOTE — PT/OT/SLP PROGRESS
Occupational Therapy      Patient Name:  Scarlett Reddy   MRN:  [id number]     Patient not seen today secondary to Unavailable (off willie for EGD). Unable to re-attempt later in PM. Will follow-up as scheduled.    Laura corado OT  10/18/2018

## 2018-10-18 NOTE — ANESTHESIA RELEASE NOTE
"Anesthesia Release from PACU Note    Patient: Scarlett Reddy    Procedure(s) Performed: Procedure(s) (LRB):  EGD (ESOPHAGOGASTRODUODENOSCOPY) (N/A)    Anesthesia type: general    Post pain: Adequate analgesia    Post assessment: no apparent anesthetic complications    Last Vitals:   Visit Vitals  BP (!) 99/54   Pulse 84   Temp 36.5 °C (97.7 °F) (Temporal)   Resp 19   Ht 5' 3" (1.6 m)   Wt 71.3 kg (157 lb 1.6 oz)   LMP  (LMP Unknown)   SpO2 100%   Breastfeeding? No   BMI 27.83 kg/m²       Post vital signs: stable    Level of consciousness: awake, alert  and oriented    Nausea/Vomiting: no nausea/no vomiting    Complications: none    Airway Patency: patent    Respiratory: unassisted    Cardiovascular: stable and blood pressure at baseline    Hydration: euvolemic  "

## 2018-10-18 NOTE — ASSESSMENT & PLAN NOTE
- Creatinine on arrival 0.8 and has been up trending   BUN/cr (10/11/18): 91/1.9   Tacrolimus 26.9 (10/08)  Nephrology consulted  Renal US (10/11): no hydronephrosis  Echo 10/12: diastolic dysfunction, likely 2/2 to ARF  - pt on renal diet w 1500 ml FR.  CRRT 10/14  SLED 10/17

## 2018-10-18 NOTE — PROGRESS NOTES
" (SW) presented to the patient's (pt) room for follow up and continuity of care.  Pt was not present as she had been taken down for a tests; therefore, SW met with pt's daughter Gabriela ([phone]-speaks limited, however, understandable English).  Gabriela stated she would be visiting with the pt for approximately two weeks assisting her father with the pt's care.  She was not certain which of her siblings would be returning once she left; however, she was certain another family member would be present with her father.  Gabriela is not staying at the  with the pt and her father as she is visiting along with her two minor daughters ages 2 and 3.  She advised she was still waiting for the doctor to indicate if her children would be able to visit with the pt.  TIMBO inquired how she felt the pt was coping with her recovery from surgery.  Gabriela advised she thought the pt was doing "well" with everything.  She reported her father has a "runny nose" due to the recent weather conditions; however, overall both of her parents were doing "well".  There were no other concerns or issues discussed.  SW remains available for education, resources, support and discharge planning as appropriate.   "

## 2018-10-18 NOTE — PROGRESS NOTES
GREGORIO Dubon notified pts repeat H&H this am critical 6.4/19.3.  Pt also w/ low blood sugar of 61 this am.  Orders to go ahead and give IV medication for correction of blood sugar.  Pt to take am pills with sips of water.    Orders placed for type and screen STAT.  Orders carried out.  Will monitor.

## 2018-10-18 NOTE — SUBJECTIVE & OBJECTIVE
Scheduled Meds:   bisacodyl  10 mg Oral QHS    ciprofloxacin HCl  500 mg Oral Daily    docusate sodium  50 mg Oral TID    entecavir  0.5 mg Oral Q72H    ergocalciferol  50,000 Units Oral Q7 Days    fluconazole  200 mg Oral Daily    furosemide  80 mg Intravenous BID    insulin aspart U-100  15 Units Subcutaneous TIDWM    insulin detemir U-100  5 Units Subcutaneous BID    midodrine  15 mg Oral TID    mirtazapine  7.5 mg Oral QHS    mycophenolate  1,000 mg Oral BID    pantoprazole  40 mg Intravenous BID    predniSONE  15 mg Oral Daily    Followed by    [START ON 10/24/2018] predniSONE  10 mg Oral Daily    Followed by    [START ON 10/31/2018] predniSONE  5 mg Oral Daily    Followed by    [START ON 11/7/2018] predniSONE  2.5 mg Oral Daily    sodium bicarbonate  1,300 mg Oral BID    sulfamethoxazole-trimethoprim 400-80mg  1 tablet Oral Every Mon, Wed, Fri    tacrolimus  1 mg Oral BID    ursodiol  300 mg Oral BID    valGANciclovir  450 mg Oral Twice Weekly     Continuous Infusions:   sodium chloride 0.9% 200 mL/hr at 10/18/18 0445     PRN Meds:sodium chloride, sodium chloride, bisacodyl, calcium carbonate, dextrose 50%, dextrose 50%, glucagon (human recombinant), glucose, glucose, insulin aspart U-100, ondansetron, oxyCODONE, oxyCODONE, polyethylene glycol, promethazine (PHENERGAN) IVPB, sodium chloride 0.9%    Review of Systems   Constitutional: Positive for activity change, appetite change and fatigue. Negative for fever.   Respiratory: Negative for cough and shortness of breath.    Cardiovascular: Positive for leg swelling. Negative for chest pain.   Gastrointestinal: Positive for abdominal distention. Negative for abdominal pain, blood in stool, constipation, diarrhea and vomiting.   Genitourinary: Negative for decreased urine volume, difficulty urinating and dysuria.   Musculoskeletal: Negative for arthralgias and back pain.        Hip pain     Neurological: Negative for dizziness, tremors and  headaches.     Objective:     Vital Signs (Most Recent):  Temp: 97.5 °F (36.4 °C) (10/18/18 1140)  Pulse: 92 (10/18/18 1140)  Resp: 19 (10/18/18 1140)  BP: (!) 96/57 (10/18/18 1140)  SpO2: 100 % (10/18/18 1140) Vital Signs (24h Range):  Temp:  [97.5 °F (36.4 °C)-99.3 °F (37.4 °C)] 97.5 °F (36.4 °C)  Pulse:  [] 92  Resp:  [10-19] 19  SpO2:  [96 %-100 %] 100 %  BP: ()/(53-72) 96/57     Weight: 71.3 kg (157 lb 1.6 oz)  Body mass index is 27.83 kg/m².    Intake/Output - Last 3 Shifts       10/16 0700 - 10/17 0659 10/17 0700 - 10/18 0659 10/18 0700 - 10/19 0659    P.O. 1690 1140     I.V. (mL/kg)  0 (0)     Blood 250      Other  0     Total Intake(mL/kg) 1940 (28.4) 1140 (16)     Urine (mL/kg/hr) 2200 (1.3) 1350 (0.8)     Other  1305     Stool 0 0     Total Output 2200 2655     Net -260 -1515            Stool Occurrence 1 x 0 x           Physical Exam   Constitutional: She is oriented to person, place, and time.   Temporal and distal extremity muscle wasting   Eyes: EOM are normal. Pupils are equal, round, and reactive to light. Scleral icterus is present.   Neck: Normal range of motion.   Cardiovascular: Normal rate, regular rhythm and normal heart sounds.   No murmur heard.  Pulmonary/Chest: Effort normal and breath sounds normal. No respiratory distress. She has no wheezes.   Abdominal: Soft. Bowel sounds are normal. She exhibits distension. There is no tenderness.   Chevron incision w/ staples, CDI     Musculoskeletal: Normal range of motion. She exhibits edema (BL lower extremities).   Neurological: She is alert and oriented to person, place, and time.   Skin: Skin is warm and dry.   Bruising on lower abdomen     Nursing note and vitals reviewed.      Laboratory:  Immunosuppressants         Stop Route Frequency     tacrolimus capsule 1 mg      -- Oral 2 times daily     mycophenolate capsule 1,000 mg      -- Oral 2 times daily        CBC:   Recent Labs   Lab 10/18/18  0633 10/18/18  0650   WBC 8.76  --     RBC 2.12*  --    HGB 6.4* 6.4*   HCT 19.3* 19.3*   PLT 56*  --    MCV 91  --    MCH 30.2  --    MCHC 33.2  --      CMP:   Recent Labs   Lab 10/18/18  0633   GLU 57*  57*   CALCIUM 7.5*  7.5*   ALBUMIN 2.2*  2.2*   PROT 3.7*   *  131*   K 3.6  3.6   CO2 24  24   CL 97  97   BUN 62*  62*   CREATININE 1.3  1.3   ALKPHOS 122   ALT 43   AST 37   BILITOT 5.4*     Labs within the past 24 hours have been reviewed.    Diagnostic Results:  I have personally reviewed all pertinent imaging studies.

## 2018-10-18 NOTE — TRANSFER OF CARE
"Anesthesia Transfer of Care Note    Patient: Scarlett Reddy    Procedure(s) Performed: Procedure(s) (LRB):  EGD (ESOPHAGOGASTRODUODENOSCOPY) (N/A)    Patient location: [facility]    Anesthesia Type: general    Transport from OR: Transported from OR on 2-3 L/min O2 by NC with adequate spontaneous ventilation    Post pain: adequate analgesia    Post assessment: no apparent anesthetic complications and tolerated procedure well    Post vital signs: stable    Level of consciousness: awake, alert and oriented    Nausea/Vomiting: no nausea/vomiting    Complications: none    Transfer of care protocol was followed      Last vitals:   Visit Vitals  BP (!) 114/55 (BP Location: Left arm, Patient Position: Lying)   Pulse 85   Temp 36.4 °C (97.5 °F) (Temporal)   Resp 18   Ht 5' 3" (1.6 m)   Wt 71.3 kg (157 lb 1.6 oz)   LMP  (LMP Unknown)   SpO2 98%   Breastfeeding? No   BMI 27.83 kg/m²     "

## 2018-10-18 NOTE — [employer] PATIENT INSTRUCTIONS
Discharge Summary/Instructions after an Endoscopic Procedure  Patient Name: Scarlett Reddy  Patient MRN: [id number]  Patient YOB: 1950 Thursday, October 18, 2018  Chung Robins MD  RESTRICTIONS:  During your procedure today, you received medications for sedation.  These   medications may affect your judgment, balance and coordination.  Therefore,   for 24 hours, you have the following restrictions:   - DO NOT drive a car, operate machinery, make legal/financial decisions,   sign important papers or drink alcohol.    ACTIVITY:  Today: no heavy lifting, straining or running due to procedural   sedation/anesthesia.  The following day: return to full activity including work.  DIET:  Eat and drink normally unless instructed otherwise.     TREATMENT FOR COMMON SIDE EFFECTS:  - Mild abdominal pain, nausea, belching, bloating or excessive gas:  rest,   eat lightly and use a heating pad.  - Sore Throat: treat with throat lozenges and/or gargle with warm salt   water.  - Because air was used during the procedure, expelling large amounts of air   from your rectum or belching is normal.  - If a bowel prep was taken, you may not have a bowel movement for 1-3 days.    This is normal.  SYMPTOMS TO WATCH FOR AND REPORT TO YOUR PHYSICIAN:  1. Abdominal pain or bloating, other than gas cramps.  2. Chest pain.  3. Back pain.  4. Signs of infection such as: chills or fever occurring within 24 hours   after the procedure.  5. Rectal bleeding, which would show as bright red, maroon, or black stools.   (A tablespoon of blood from the rectum is not serious, especially if   hemorrhoids are present.)  6. Vomiting.  7. Weakness or dizziness.  GO DIRECTLY TO THE NEAREST EMERGENCY ROOM IF YOU HAVE ANY OF THE FOLLOWING:      Difficulty breathing              Chills and/or fever over 101 F   Persistent vomiting and/or vomiting blood   Severe abdominal pain   Severe chest pain   Black, tarry stools   Bleeding- more than one tablespoon   Any  other symptom or condition that you feel may need urgent attention  Your doctor recommends these additional instructions:  If any biopsies were taken, your doctors clinic will contact you in 1 to 2   weeks with any results.  - [facility] patient to [facility] for [facility] care.   - Await pathology results.   - Use a proton pump inhibitor IV BID.   - The findings and recommendations were discussed with the referring   physician.   For questions, problems or results please call your physician - Chung Robins MD at Work:  [phone].  [facility], EMERGENCY ROOM PHONE NUMBER: [phone]  IF A COMPLICATION OR EMERGENCY SITUATION ARISES AND YOU ARE UNABLE TO REACH   YOUR PHYSICIAN - GO DIRECTLY TO THE EMERGENCY ROOM.  Chung Robins MD  10/18/2018 2:01:55 PM  This report has been verified and signed electronically.  [employer]

## 2018-10-18 NOTE — PROGRESS NOTES
[facility]  Nephrology  Progress Note    Patient Name: Scarlett Reddy  MRN: [id number]  Admission Date: 10/1/2018  Hospital Length of Stay: 16 days  Attending Provider: Cristobal Marmolejo Jr., MD   Primary Care Physician: Primary Doctor No  Principal Problem:Liver transplanted    Subjective:     HPI: 69 y/o female with ESLD due to Hep B and D cirrhosis MELD of 23 on 10/01/18.  Paracentesis 10/1 with 5L removed, no fluid sent for analysis. 10/02 significant for hyponatremia, Na 119 and she was admitted to LTS for sodium monitoring. On 10/05/18 she is s/p DBDLT (CMV D+/R+, steroid induction, MMF/tacro/pred maintenance) with take back on 10/6 secondary to hyperbilirubinema, no leak identified. Post-op course complicated by hypercapneic and hypoxic respiratory failure and was intubated 10/06 and extubated 10/07. Liver bx (10/6) sig for Zone 3 hemorrhage and collapse, in addition to cholestasis. ID consulted to comment on positive diphtheroid culture from ascitic fluid (likely skin colonization) as well as ESBL Klebsiella pneumoniae in urine culture (10/4).  Pt asymptomatic and cell count from ascitic fluid unremarkable. Mild peritransplant ascites- repeat US 10/6 with increased arterial resistive indices. Repeat US 10/9 w/ continued elevation in RIs and fluid collections. LFTs trending down nicely.     Patient is currently on entecavir 0.5 mg, Hepagam, mycophenalate 1000 mg BID, prednisone 20 mg and prophylaxis with Bactrim, fluconazole and Valgancyclovir.    Nephrology consulted for acute kidney failure is setting of Orthotopic Liver Transplant. Cr 0.8 on 10/05, started to trend up and 1.5 (10/09), 1.8 (10/10), and 1.9 (10/11). UOP 10/10/18: 520 ml. UA (10/09): 1+ occult blood and 1+ protein with RBC and amorphous casts.      Interval History: 10/18/2018: Patient is verbal and communicative. Continues to complaint of bilateral peripheral edema up to hip region. Patient received SLED for 6 hours 10/17 and  treatment finished at 5 am (10/18).   cc. FOBT positive, Patient had EGD in am.     Review of patient's allergies indicates:  No Known Allergies  Current Facility-Administered Medications   Medication Frequency    0.9%  NaCl infusion (CRRT USE ONLY) Continuous    0.9%  NaCl infusion (for blood administration) Q24H PRN    0.9%  NaCl infusion (for blood administration) Q24H PRN    bisacodyl EC tablet 10 mg QHS    bisacodyl suppository 10 mg Daily PRN    calcium carbonate 200 mg calcium (500 mg) chewable tablet 500 mg TID PRN    ciprofloxacin HCl tablet 500 mg Daily    dextrose 50% injection 12.5 g PRN    dextrose 50% injection 25 g PRN    docusate sodium capsule 50 mg TID    entecavir tablet 0.5 mg Q72H    ergocalciferol capsule 50,000 Units Q7 Days    fluconazole tablet 200 mg Daily    furosemide injection 80 mg BID    glucagon (human recombinant) injection 1 mg PRN    glucose chewable tablet 16 g PRN    glucose chewable tablet 24 g PRN    hepatitis B immun glob-maltose (Hepagam B) greatr than 312 unit/mL (5 mL) 9,999.912 Units in sodium chloride 0.9% 250 mL Once    insulin aspart U-100 pen 0-5 Units QID (AC + HS) PRN    insulin aspart U-100 pen 15 Units TIDWM    insulin detemir U-100 pen 5 Units BID    midodrine tablet 15 mg TID    mirtazapine tablet 7.5 mg QHS    mycophenolate capsule 1,000 mg BID    ondansetron disintegrating tablet 8 mg Q8H PRN    oxyCODONE immediate release tablet 5 mg Q4H PRN    oxyCODONE immediate release tablet Tab 10 mg Q4H PRN    pantoprazole injection 40 mg BID    polyethylene glycol packet 17 g Daily PRN    predniSONE tablet 15 mg Daily    Followed by    [START ON 10/24/2018] predniSONE tablet 10 mg Daily    Followed by    [START ON 10/31/2018] predniSONE tablet 5 mg Daily    Followed by    [START ON 11/7/2018] predniSONE tablet 2.5 mg Daily    promethazine (PHENERGAN) 6.25 mg in dextrose 5 % 50 mL IVPB Q6H PRN    sodium bicarbonate tablet 1,300 mg  BID    sodium chloride 0.9% flush 3 mL PRN    sulfamethoxazole-trimethoprim 400-80mg per tablet 1 tablet Every Mon, Wed, Fri    tacrolimus capsule 1 mg BID    ursodiol capsule 300 mg BID    valGANciclovir tablet 450 mg Twice Weekly       Objective:     Vital Signs (Most Recent):  Temp: 97.8 °F (36.6 °C) (10/18/18 1515)  Pulse: 89 (10/18/18 1535)  Resp: 14 (10/18/18 1515)  BP: (!) 94/56 (10/18/18 1515)  SpO2: 100 % (10/18/18 1515)  O2 Device (Oxygen Therapy): room air (10/18/18 1515) Vital Signs (24h Range):  Temp:  [97.5 °F (36.4 °C)-98.9 °F (37.2 °C)] 97.8 °F (36.6 °C)  Pulse:  [] 89  Resp:  [10-19] 14  SpO2:  [96 %-100 %] 100 %  BP: ()/(53-72) 94/56     Weight: 71.3 kg (157 lb 1.6 oz) (10/18/18 0401)  Body mass index is 27.83 kg/m².  Body surface area is 1.78 meters squared.    I/O last 3 completed shifts:  In: 1310 [P.O.:1310]  Out: 3555 [Urine:2250; Other:1305]    Physical Exam   Constitutional: She is oriented to person, place, and time. She appears well-developed.   Temporal and distal extremity muscle wasting; yellowing of skin  Eyes: EOM are normal. Pupils are equal, round, and reactive to light. Scleral icterus is present.   Neck: Normal range of motion.   Cardiovascular: Normal rate and regular rhythm.   No murmur heard.  Pulmonary/Chest: Effort normal and breath sounds normal. No respiratory distress. She has no wheezes.   Abdominal: Soft. Bowel sounds are normal. She exhibits distension. Chevron incision w/ staples, CDI  Musculoskeletal: Normal range of motion. She exhibits edema (BL lower extremities).   Neurological: She is alert and oriented to person, place, and time.   Skin: Skin is warm and dry.     Significant Labs:  CBC:   Recent Labs   Lab 10/18/18  1505   WBC 12.37   RBC 3.01*   HGB 9.1*   HCT 26.8*   PLT 46*   MCV 89   MCH 30.2   MCHC 34.0     CMP:   Recent Labs   Lab 10/18/18  0633 10/18/18  1505   GLU 57*  57* 161*   CALCIUM 7.5*  7.5* 7.7*   ALBUMIN 2.2*  2.2* 2.2*    PROT 3.7*  --    *  131* 131*   K 3.6  3.6 3.7   CO2 24  24 26   CL 97  97 96   BUN 62*  62* 69*   CREATININE 1.3  1.3 1.6*   ALKPHOS 122  --    ALT 43  --    AST 37  --    BILITOT 5.4*  --      All labs within the past 24 hours have been reviewed.       Assessment/Plan:     Post LT - Acute renal failure          - acute gradual worsening of renal function s/p OLT  - 10/06 was taken to the OR for possibility of billiary leak and post op complication due to hypercapnic hypoxic respiratory failure, no blood pressure drop intraoperatively per anesthesiology notes. Patient has not been needing vasopressors to maintain BP  - Creatinine on arrival 0.8 and has been up trending   - BUN/cr (10/11/18): 91/1.9; (10/12/18): 100/2.0  - Tacrolimus 26.9 (10/08); 6.8 (10/12)  - Protein Creatine ratio: 0.82  - UA consistent with hematuria, proteinuria, RBC and amorphous casts   - Fena 0.5%; consistent with hypovolemia   - Renal US negative for obstruction, stricture or, hydronephrosis  - (10/17/18): BUN/Cr: 114/2.5   - (10/18/18): BUN/Cr: 62/1.3     Assessment: Acute Renal failure in setting of hypovolemia in setting of acute gastrointestinal bleed. acute tubular necrosis from immunosuppressive agents      Plan:  -Given rising BUN, delta creatinine 0.6 10/16 and 0.3 10/17 in setting of gastrointestinal bleed; received SLED (10/17) for 6 hours with Na 130, K 4.0 with no fluid removal. s/p EGD; will f/o GI reccs  - Continue fluid restriction intake max 1L/24 hours  - avoid nephrotoxic drugs, ACEI/ARB, NSAIDS  - adjust drugs to GFR: bactrim three times a week  - Strict I and O's. Monitor UOP  - No indications for acute RRT            Acute blood loss anemia  BUN/Cr: 114/2.2(10/16) -> 120/2.5(10/17). Creatinine is continuing to rise; however, BUN elevated disproportionately. Patient has been transfused 2 pRBC on (10/14) and Hb (10/16) 6.9. Transfused 1 pRBC (10/16).   -  FOBT positive  - GI consulted; EGD  report  Findings:       The esophagus was normal.       Mild portal hypertensive gastropathy was found in the entire        examined stomach.       The cardia and gastric fundus were normal on retroflexion.       Diffuse severely friable, ulcerated mucosa with active bleeding was        found in the entire duodenum. Biopsies were taken with a cold        forceps for histology. Coagulation for hemostasis using argon plasma        was unsuccessful as there is diffuse oozing.  Impression:           - Normal esophagus.                        - Portal hypertensive gastropathy.                        - Bleeding friable, ulcerated duodenal mucosa.                         Biopsied. Treated with argon plasma coagulation                         (APC). Treatment not successful.  Recommendation:       - Return patient to [facility] for ongoing care.                        - Await pathology results.                        - Use a proton pump inhibitor IV BID.     Thank you for your consult. I will follow-up with patient. Please contact us if you have any additional questions.     Darrel Adhikari MD   Internal Medicine PGY II  Pager: [phone]  Nephrology  [facility]

## 2018-10-18 NOTE — PROGRESS NOTES
Pt taken down to endo with transport, , daughter, and this RN.  Blood running without issues.  Desmopressin to be hung following first unit.  Second unit of blood to be released and hung by tarik RN.  VSS.  Pt AAOx4.  No issues.  Will monitor for pts return.

## 2018-10-18 NOTE — ASSESSMENT & PLAN NOTE
- pt w decreased H/H.  Transfuse 1 u PRBC 10/14 and 10/16  -10/18: transfused 2 u PRBC, 1 u platelets. DDAVP x 1   - pt c/o right side pain and dark stools (10/15)  -FOBT +  -LDH elevated, Hapto < 10  -EGD 10/18  -cont to monitor

## 2018-10-18 NOTE — PROGRESS NOTES
Patient was attempted per nurse patient was cleared to be seen. Upon entering room patient and family rerefused secondary to patient having low H&H (6.4&19.3) and being hook up to hemodialysis all night. Family also reported that she was going to receive a unit of blood.   Patient reported that she was really weak. Spoke with nurse to confirm that patient was going to receive a unit of blood. Will attempt as scheduled Vivian Ponce 10/18/2018

## 2018-10-18 NOTE — PLAN OF CARE
Problem: Patient Care Overview  Goal: Plan of Care Review  Outcome: Ongoing (interventions implemented as appropriate)  Pt aao x 4. VSS. Bed in low locked pos call light within reach. Pt completed 6 hours SLED. NPO at [place] for EGD. 900 cc uop. Bs monitored ac/hs. Chevron dedrick w/ staples, painted w/ betadine. No c/o pain. Am labs ordered.

## 2018-10-18 NOTE — ANESTHESIA POSTPROCEDURE EVALUATION
"Anesthesia Post Evaluation    Patient: Scarlett Reddy    Procedure(s) Performed: Procedure(s) (LRB):  EGD (ESOPHAGOGASTRODUODENOSCOPY) (N/A)    Final Anesthesia Type: general  Patient location during evaluation: PACU  Patient participation: Yes- Able to Participate  Level of consciousness: awake and alert  Post-procedure vital signs: reviewed and stable  Pain management: adequate  Airway patency: patent  PONV status at discharge: No PONV  Anesthetic complications: no      Cardiovascular status: blood pressure returned to baseline  Respiratory status: unassisted  Hydration status: euvolemic  Follow-up not needed.        Visit Vitals  BP (!) 99/54   Pulse 84   Temp 36.5 °C (97.7 °F) (Temporal)   Resp 19   Ht 5' 3" (1.6 m)   Wt 71.3 kg (157 lb 1.6 oz)   LMP  (LMP Unknown)   SpO2 100%   Breastfeeding? No   BMI 27.83 kg/m²       Pain/Annette Score: Pain Assessment Performed: Yes (10/18/2018  2:09 PM)  Presence of Pain: denies (10/18/2018  2:09 PM)  Annette Score: 10 (10/18/2018  2:09 PM)  Modified Annette Score: 19 (10/18/2018  2:09 PM)        "

## 2018-10-18 NOTE — PROGRESS NOTES
[facility]  Liver Transplant  Progress Note    Patient Name: Scarlett Reddy  MRN: [id number]  Admission Date: 10/1/2018  Hospital Length of Stay: 16 days  Code Status: Full Code  Primary Care Provider: Primary Doctor No  Post-Operative Day: 13    ORGAN:   LIVER  Disease Etiology: Cirrhosis: Type B and D  Donor Type:    - Brain Death  CDC High Risk:   No  Donor CMV Status:   Donor CMV Status: Positive  Donor HBcAB:   Negative  Donor HCV Status:   Negative  Donor HBV SETH: Negative  Donor HCV SETH: Negative  Whole or Partial: Whole Liver  Biliary Anastomosis: End to End  Arterial Anatomy: Replaced Left Hepatic and Right Hepatic  Subjective:     History of Present Illness:  Ms. Reddy is a 67 y/o female with PMH of ESLD secondary Hep B and D.  Listed for liver transplant with MELD 23.  Paracentesis 10/1 with 5L removed, no fluid sent for analysis.  Morning labs significant for hyponatremia, Na 119.  Pt admitted to LTS for sodium monitoring.        Hospital Course:  Hospital Course: 67 y/o F h/o HBV/delta cirrhosis s/p DBDLT 10/5/18 (CMV D+/R+, steroid induction, MMF/tacro/pred maintenance) with take back on 10/6 2/2 hyperbilirubinema and bilious VALORIE drainage, no leak identified. Post-op course c/b hypercapneic and hypoxic respiratory failure and was reintubated though quickly extubated now doing well. Liver bx (10/6) sig for Zone 3 hemorrhage and collapse, in addition to cholestasis. Transferred from ICU on POD #4. ID consulted to comment on positive diphtheroid culture from ascitic fluid (likely skin colonization) as well as ESBL Klebsiella pneumoniae in urine culture (10/4).  Pt asymptomatic and cell count from ascitic fluid unremarkable. Per ID recs, no need to treat diphtheroids or asymptomatic ESBL Kleb pneumo bacteriuria though pt has had 6 days of therapy which would cover these organisms. Mild peritransplant ascites- repeat US 10/6 with increased arterial resistive indices. Repeat US 10/9  w/ continued elevation in RIs and fluid collections. LFTs trending down nicely. Acute gradual worsening of renal function s/p OLT.Creatinine on arrival 0.8 and has been up trending. Nephrology consulted for post-op EVA. Remains on renal/low K diet. Patient continued to be diuresed with lasix  mg (10/13) and 60 mg IV BID (10/14). On 10/14, had hyponatremia of 125 and  and underwent CRRT (10/15). Tolerated well. Additionally, pt's Hgb low- transfused total of 2 units pRBC (10/14, 10/16). Pt reported dark stools 10/15 but color has normalized. No blood noted on MORELIA. FOBT+. Concern for possible slow GI bleed. Pt on protonix IV 40 BID.     Interval history: Pt still feeling weak this morning. Received SLED last night (10/17), with improvements noted.  H/H dropped (6.4). Transfused 2 units pRBC and 1 unit platelets. DDAVP x 1 given.Will talk w/ nephrology about further dialysis plans. EGD scheduled for today. Will follow-up results. Treating Klebsiella in urine w/ ciprofloxacin (started 10/17). WBC improving. LFTs and T bili cont to trend down. Denies N/V. Encouraged pt to increase nutritional intake. Will resume renal/low K diet after procedure. No further complaints at this time    Scheduled Meds:   bisacodyl  10 mg Oral QHS    ciprofloxacin HCl  500 mg Oral Daily    docusate sodium  50 mg Oral TID    entecavir  0.5 mg Oral Q72H    ergocalciferol  50,000 Units Oral Q7 Days    fluconazole  200 mg Oral Daily    furosemide  80 mg Intravenous BID    insulin aspart U-100  15 Units Subcutaneous TIDWM    insulin detemir U-100  5 Units Subcutaneous BID    midodrine  15 mg Oral TID    mirtazapine  7.5 mg Oral QHS    mycophenolate  1,000 mg Oral BID    pantoprazole  40 mg Intravenous BID    predniSONE  15 mg Oral Daily    Followed by    [START ON 10/24/2018] predniSONE  10 mg Oral Daily    Followed by    [START ON 10/31/2018] predniSONE  5 mg Oral Daily    Followed by    [START ON 11/7/2018]  predniSONE  2.5 mg Oral Daily    sodium bicarbonate  1,300 mg Oral BID    sulfamethoxazole-trimethoprim 400-80mg  1 tablet Oral Every Mon, Wed, Fri    tacrolimus  1 mg Oral BID    ursodiol  300 mg Oral BID    valGANciclovir  450 mg Oral Twice Weekly     Continuous Infusions:   sodium chloride 0.9% 200 mL/hr at 10/18/18 0445     PRN Meds:sodium chloride, sodium chloride, bisacodyl, calcium carbonate, dextrose 50%, dextrose 50%, glucagon (human recombinant), glucose, glucose, insulin aspart U-100, ondansetron, oxyCODONE, oxyCODONE, polyethylene glycol, promethazine (PHENERGAN) IVPB, sodium chloride 0.9%    Review of Systems   Constitutional: Positive for activity change, appetite change and fatigue. Negative for fever.   Respiratory: Negative for cough and shortness of breath.    Cardiovascular: Positive for leg swelling. Negative for chest pain.   Gastrointestinal: Positive for abdominal distention. Negative for abdominal pain, blood in stool, constipation, diarrhea and vomiting.   Genitourinary: Negative for decreased urine volume, difficulty urinating and dysuria.   Musculoskeletal: Negative for arthralgias and back pain.        Hip pain     Neurological: Negative for dizziness, tremors and headaches.     Objective:     Vital Signs (Most Recent):  Temp: 97.5 °F (36.4 °C) (10/18/18 1140)  Pulse: 92 (10/18/18 1140)  Resp: 19 (10/18/18 1140)  BP: (!) 96/57 (10/18/18 1140)  SpO2: 100 % (10/18/18 1140) Vital Signs (24h Range):  Temp:  [97.5 °F (36.4 °C)-99.3 °F (37.4 °C)] 97.5 °F (36.4 °C)  Pulse:  [] 92  Resp:  [10-19] 19  SpO2:  [96 %-100 %] 100 %  BP: ()/(53-72) 96/57     Weight: 71.3 kg (157 lb 1.6 oz)  Body mass index is 27.83 kg/m².    Intake/Output - Last 3 Shifts       10/16 0700 - 10/17 0659 10/17 0700 - 10/18 0659 10/18 0700 - 10/19 0659    P.O. 1690 1140     I.V. (mL/kg)  0 (0)     Blood 250      Other  0     Total Intake(mL/kg) 1940 (28.4) 1140 (16)     Urine (mL/kg/hr) 2200 (1.3) 1350  (0.8)     Other  1305     Stool 0 0     Total Output 2200 2655     Net -260 -1515            Stool Occurrence 1 x 0 x           Physical Exam   Constitutional: She is oriented to person, place, and time.   Temporal and distal extremity muscle wasting   Eyes: EOM are normal. Pupils are equal, round, and reactive to light. Scleral icterus is present.   Neck: Normal range of motion.   Cardiovascular: Normal rate, regular rhythm and normal heart sounds.   No murmur heard.  Pulmonary/Chest: Effort normal and breath sounds normal. No respiratory distress. She has no wheezes.   Abdominal: Soft. Bowel sounds are normal. She exhibits distension. There is no tenderness.   Chevron incision w/ staples, CDI     Musculoskeletal: Normal range of motion. She exhibits edema (BL lower extremities).   Neurological: She is alert and oriented to person, place, and time.   Skin: Skin is warm and dry.   Bruising on lower abdomen     Nursing note and vitals reviewed.      Laboratory:  Immunosuppressants         Stop Route Frequency     tacrolimus capsule 1 mg      -- Oral 2 times daily     mycophenolate capsule 1,000 mg      -- Oral 2 times daily        CBC:   Recent Labs   Lab 10/18/18  0633 10/18/18  0650   WBC 8.76  --    RBC 2.12*  --    HGB 6.4* 6.4*   HCT 19.3* 19.3*   PLT 56*  --    MCV 91  --    MCH 30.2  --    MCHC 33.2  --      CMP:   Recent Labs   Lab 10/18/18  0633   GLU 57*  57*   CALCIUM 7.5*  7.5*   ALBUMIN 2.2*  2.2*   PROT 3.7*   *  131*   K 3.6  3.6   CO2 24  24   CL 97  97   BUN 62*  62*   CREATININE 1.3  1.3   ALKPHOS 122   ALT 43   AST 37   BILITOT 5.4*     Labs within the past 24 hours have been reviewed.    Diagnostic Results:  I have personally reviewed all pertinent imaging studies.    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    -POD 1 US: increased arterial resistive indices, suggestive of edema vs cute rejection vs congestion  -Repeat US 10/9 with continued elevation of RIs  -LFTs trending down  -T bili trending down  -repeat liver US (10/11): elevated RIs  - LFTs trending down.           UTI (urinary tract infection)    Urine Cx 10/15 w/ Klebsiella pneumonia  Started Ciprofloxacin 10/17  Pt asymptomatic at this time       Acute blood loss anemia    Patient has been transfused total 2 pRBC (10/14. 10/16)  Possible concern of GI bleed in setting of post transplant, and complaints of black stool.  - FOBT +  EGD 10/18            Post LT - Acute renal failure    - Creatinine on arrival 0.8 and has been up trending   BUN/cr (10/11/18): 91/1.9   Tacrolimus 26.9 (10/08)  Nephrology consulted  Renal US (10/11): no hydronephrosis  Echo 10/12: diastolic dysfunction, likely 2/2 to ARF  - pt on renal diet w 1500 ml FR.  CRRT 10/14  SLED 10/17     Hypervolemia associated with renal insufficiency    - Diuresed w/ Lasix 100mg x 1 (10/12).  - CRRT 10/14.  .    -Diuresing w/ Lasix 80 BID  -SLED 10/17       EVA (acute kidney injury)           Metabolic acidosis    - Started Na bicarb 1300 QID.  -CRRT 10/14  -SLED 10/17         At risk for opportunistic infections    - Valcyte for CMV prophylaxis --> will start at discharge or  POD #10 in hospital  - Bactrim for PCP prophylaxis (MWF)  - Fluconazole for fungal prophylaxis        Long-term use of immunosuppressant medication    -  Cellcept/Prograf/pred  -  Held prograf 10/8 for elevated levels  -  Resumed Prograf 10/10/19 at dose of 0.5 mg.  Will adjust as needed.   -  Will monitor for signs of toxic side effects, check daily tacrolimus troughs, and change meds accordingly.       Prophylactic immunotherapy    - See long term use immunosuppresion       Ascites    - paracentesis 10/1, 5L removed, no fluid sent for analysis.  - diuresing with albumin and lasix 10/9.  - Per Nephrology, on 10/13- Lasix 80  mg bid and monitor.  No need for RRT.  -  10/14, pt w worsening edema.  , .  Nephr plan for CRRT this evening.        Weakness    - see physical decond.       Physical deconditioning    - PT/OT consulted.  - Recommend home health PT and rolling walker at discharge        Hyponatremia    - Na 119 on admit.  -Na was improving post transplant w HD.  - Pt POD #9 w EVA, Na worse today at 125.  Changed FR to 1L.  - Na stable post CRRT  -Na trending down.   -DDAVP given today. Will repeat labs this afternoon and monitor Na levels            Hypotension    - continue Midodrine 15 mg TID.   Monitor.       Anemia requiring transfusions    - pt w decreased H/H.  Transfuse 1 u PRBC 10/14 and 10/16  -10/18: transfused 2 u PRBC, 1 u platelets. DDAVP x 1   - pt c/o right side pain and dark stools (10/15)  -FOBT +  -LDH elevated, Hapto < 10  -EGD 10/18  -cont to monitor        Severe malnutrition    - dietary consulted.   - temporal and distal extremity muscle wasting and edema  - encourage supplements and to increase nutritional intake   - pt on renal/low K+ diet     Type 2 diabetes mellitus with diabetic polyneuropathy, with long-term current use of insulin    - continue home regimen.  - endocrine consulted.  -Pt off insulin drip at this time.  Apprec endo recs.        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  -Tx w/ Entecavir- dose changed to q72 hours given renal function.             VTE Risk Mitigation (From admission, onward)        Ordered     Place sequential compression device  Until discontinued      10/05/18 0709          The patients clinical status was discussed at multidisplinary rounds, involving transplant surgery, transplant medicine, pharmacy, nursing, nutrition, and social work    Discharge Planning:  No Patient Care Coordination Note on file.      Jihan Soares PA-C  Liver Transplant  [facility]

## 2018-10-18 NOTE — ASSESSMENT & PLAN NOTE
- Na 119 on admit.  -Na was improving post transplant w HD.  - Pt POD #9 w EVA, Na worse today at 125.  Changed FR to 1L.  - Na stable post CRRT  -Na trending down.   -DDAVP given today. Will repeat labs this afternoon and monitor Na levels

## 2018-10-18 NOTE — PROGRESS NOTES
Be Willams, KEEGAN notified pts blood sugar 61 on morning accucheck.  Pt NPO at this time for EGD.  Orders to give dextrose as ordered and recheck.  Orders carried out. Will monitor.

## 2018-10-18 NOTE — PROGRESS NOTES
"[facility]  Endocrinology  Progress Note    Admit Date: 10/1/2018     Reason for Consult: Management of type 2 DM, Hyperglycemia      Surgical Procedure and Date: Liver transplant 10/5/18, Ex lap 10/6/18     Diabetes diagnosis year: 2013     Home Diabetes Medications:  Lantus 18 units HS and novolog 17 units with meals plus correction scale (150-200 +1)        Lab Results   Component Value Date     HGBA1C 6.8 (H) 08/16/2018        How often checking glucose at home? 3-4   BG readings on regimen: 120-150.  Post prandial readings as high as upper 200s  Hypoglycemia on the regimen? yes, none recently   Missed doses on regimen?  No     Diabetes Complications include:     Diabetic peripheral neuropathy      Complicating diabetes co morbidities:   CIRRHOSIS        HPI:  Patient is a 68 y.o. female with a diagnosis of ESLD, listed for liver transplant with meld 23 who underwent live transplant on 10/5/18.  Back to OR on 10/6/18 for ex lap.  Admitted 10/1/18 for hyponatremia s/p paracentesis. She speaks primarily Vietnamese. Information obtained from recent past admission and discussion over the phone via  ([employer]). Family not at bedside. Pt request family at bedside for future conversations. Personal has known diagnosis of diabetes, endocrine consulted for diabetes management.        Interval HPI:   Overnight events:  Remains in TSU, BG elevated yesterday - trended down overnight.  Hypoglycemic this am (61).  CRRT overnight.  Low H&H this morning.  Prednisone 15 mg daily, standard steroid taper.  Eating:   NPO for EGD  Nausea: No  Hypoglycemia and intervention: Yes - 61, treated with D50  Fever: No  TPN and/or TF: No    /68   Pulse 103   Temp 97.8 °F (36.6 °C) (Oral)   Resp 13   Ht 5' 3" (1.6 m)   Wt 71.3 kg (157 lb 1.6 oz)   LMP  (LMP Unknown)   SpO2 100%   Breastfeeding? No   BMI 27.83 kg/m²      Labs Reviewed and Include    Recent Labs   Lab 10/18/18  0633   GLU 57*  57* "   CALCIUM 7.5*  7.5*   ALBUMIN 2.2*  2.2*   PROT 3.7*   *  131*   K 3.6  3.6   CO2 24  24   CL 97  97   BUN 62*  62*   CREATININE 1.3  1.3   ALKPHOS 122   ALT 43   AST 37   BILITOT 5.4*     Lab Results   Component Value Date    WBC 8.76 10/18/2018    HGB 6.4 (L) 10/18/2018    HCT 19.3 (LL) 10/18/2018    MCV 91 10/18/2018    PLT 56 (L) 10/18/2018     No results for input(s): TSH, FREET4 in the last 168 hours.  Lab Results   Component Value Date    HGBA1C 6.0 (H) 10/04/2018       Nutritional status:   Body mass index is 27.83 kg/m².  Lab Results   Component Value Date    ALBUMIN 2.2 (L) 10/18/2018    ALBUMIN 2.2 (L) 10/18/2018    ALBUMIN 2.3 (L) 10/17/2018     Lab Results   Component Value Date    PREALBUMIN 12 (L) 09/17/2018       Estimated Creatinine Clearance: 38.7 mL/min (based on SCr of 1.3 mg/dL).    Accu-Checks  Recent Labs     10/16/18  1101 10/16/18  1223 10/16/18  1732 10/16/18  2140 10/17/18  0831 10/17/18  1356 10/17/18  1819 10/17/18  2131 10/18/18  0818 10/18/18  0842   POCTGLUCOSE 262* 293* 354* 363* 152* 238* 247* 222* 61* 148*       Current Medications and/or Treatments Impacting Glycemic Control  Immunotherapy:    Immunosuppressants         Stop Route Frequency     tacrolimus capsule 0.5 mg      -- Oral 2 times daily     mycophenolate capsule 1,000 mg      -- Oral 2 times daily        Steroids:   Hormones (From admission, onward)    Start     Stop Route Frequency Ordered    11/07/18 0900  predniSONE tablet 2.5 mg      11/14 0859 Oral Daily 10/17/18 0943    10/31/18 0900  predniSONE tablet 5 mg      11/07 0859 Oral Daily 10/17/18 0943    10/24/18 0900  predniSONE tablet 10 mg      10/31 0859 Oral Daily 10/17/18 0943    10/17/18 0945  predniSONE tablet 15 mg      10/24 0859 Oral Daily 10/17/18 0943        Pressors:    Autonomic Drugs (From admission, onward)    Start     Stop Route Frequency Ordered    10/10/18 1500  midodrine tablet 15 mg      -- Oral 3 times daily 10/10/18 112     10/06/18 1617  rocuronium (ZEMURON) 10 mg/mL injection     Comments:  Created by cabinet override    10/07 0429   10/06/18 1617        Hyperglycemia/Diabetes Medications:   Antihyperglycemics (From admission, onward)    Start     Stop Route Frequency Ordered    10/17/18 2100  insulin detemir U-100 pen 13 Units      -- SubQ Nightly 10/17/18 0918    10/17/18 0800  insulin aspart U-100 pen 17 Units      -- SubQ 3 times daily with meals 10/17/18 0756    10/15/18 1819  insulin aspart U-100 pen 2 Units      -- SubQ As needed (PRN) 10/15/18 1720    10/14/18 0925  insulin aspart U-100 pen 0-5 Units      -- SubQ Before meals & nightly PRN 10/14/18 0825    10/07/18 1200  insulin regular (Humulin R) 100 Units in sodium chloride 0.9% 100 mL infusion      10/13 2100 IV Continuous 10/07/18 1055          ASSESSMENT and PLAN    * Liver transplanted    Managed per primary team  Avoid hypoglycemia    Recent Labs   Lab 10/18/18  0633 10/18/18  1505   ALT 43  --    AST 37  --    ALKPHOS 122  --    BILITOT 5.4*  --    PROT 3.7*  --    ALBUMIN 2.2*  2.2* 2.2*            Type 2 diabetes mellitus with diabetic polyneuropathy, with long-term current use of insulin    BG goal 140 - 180     Decrease Levemir to 5 units BID, first dose tonight   Decrease Novolog to 15 units with meals.   Low dose correction scale insulin  BG monitoring //0200      Discharge planning:  TBD     Severe malnutrition    Oral intake encouraged, may affect BG readings     Corticosteroids adverse reaction    On standard steroid taper per transplant team; may elevate BG readings         Prophylactic immunotherapy    May increase insulin resistance.            Be Willams, KEEGAN  Endocrinology  [facility]

## 2018-10-18 NOTE — NURSING TRANSFER
Nursing Transfer Note      10/18/2018     Transfer To: [id number]    Transfer via stretcher    Transfer with  to O2, cardiac monitoring    Transported by PCT    Chart send with patient: Yes    Notified: attempted to notify family, unable to find    Patient reassessed at: 10/18/2018 1445    Upon arrival to floor: cardiac monitor applied, patient oriented to room, call bell in reach and bed in lowest position

## 2018-10-18 NOTE — ASSESSMENT & PLAN NOTE
BG goal 140 - 180     Decrease Levemir to 5 units BID, first dose tonight   Decrease Novolog to 15 units with meals.   Low dose correction scale insulin  BG monitoring AC/HS/0200      Discharge planning:  TBD

## 2018-10-18 NOTE — ASSESSMENT & PLAN NOTE
Patient has been transfused total 2 pRBC (10/14. 10/16)  Possible concern of GI bleed in setting of post transplant, and complaints of black stool.  - FOBT +  EGD 10/18

## 2018-10-18 NOTE — SUBJECTIVE & OBJECTIVE
Interval History: 10/18/2018: Patient is verbal and communicative. Continues to complaint of bilateral peripheral edema up to hip region. Patient received SLED for 6 hours 10/17 and treatment finished at 5 am (10/18).   cc. FOBT positive, Patient had EGD in am.     Review of patient's allergies indicates:  No Known Allergies  Current Facility-Administered Medications   Medication Frequency    0.9%  NaCl infusion (CRRT USE ONLY) Continuous    0.9%  NaCl infusion (for blood administration) Q24H PRN    0.9%  NaCl infusion (for blood administration) Q24H PRN    bisacodyl EC tablet 10 mg QHS    bisacodyl suppository 10 mg Daily PRN    calcium carbonate 200 mg calcium (500 mg) chewable tablet 500 mg TID PRN    ciprofloxacin HCl tablet 500 mg Daily    dextrose 50% injection 12.5 g PRN    dextrose 50% injection 25 g PRN    docusate sodium capsule 50 mg TID    entecavir tablet 0.5 mg Q72H    ergocalciferol capsule 50,000 Units Q7 Days    fluconazole tablet 200 mg Daily    furosemide injection 80 mg BID    glucagon (human recombinant) injection 1 mg PRN    glucose chewable tablet 16 g PRN    glucose chewable tablet 24 g PRN    hepatitis B immun glob-maltose (Hepagam B) greatr than 312 unit/mL (5 mL) 9,999.912 Units in sodium chloride 0.9% 250 mL Once    insulin aspart U-100 pen 0-5 Units QID (AC + HS) PRN    insulin aspart U-100 pen 15 Units TIDWM    insulin detemir U-100 pen 5 Units BID    midodrine tablet 15 mg TID    mirtazapine tablet 7.5 mg QHS    mycophenolate capsule 1,000 mg BID    ondansetron disintegrating tablet 8 mg Q8H PRN    oxyCODONE immediate release tablet 5 mg Q4H PRN    oxyCODONE immediate release tablet Tab 10 mg Q4H PRN    pantoprazole injection 40 mg BID    polyethylene glycol packet 17 g Daily PRN    predniSONE tablet 15 mg Daily    Followed by    [START ON 10/24/2018] predniSONE tablet 10 mg Daily    Followed by    [START ON 10/31/2018] predniSONE tablet 5 mg Daily     Followed by    [START ON 11/7/2018] predniSONE tablet 2.5 mg Daily    promethazine (PHENERGAN) 6.25 mg in dextrose 5 % 50 mL IVPB Q6H PRN    sodium bicarbonate tablet 1,300 mg BID    sodium chloride 0.9% flush 3 mL PRN    sulfamethoxazole-trimethoprim 400-80mg per tablet 1 tablet Every Mon, Wed, Fri    tacrolimus capsule 1 mg BID    ursodiol capsule 300 mg BID    valGANciclovir tablet 450 mg Twice Weekly       Objective:     Vital Signs (Most Recent):  Temp: 97.8 °F (36.6 °C) (10/18/18 1515)  Pulse: 89 (10/18/18 1535)  Resp: 14 (10/18/18 1515)  BP: (!) 94/56 (10/18/18 1515)  SpO2: 100 % (10/18/18 1515)  O2 Device (Oxygen Therapy): room air (10/18/18 1515) Vital Signs (24h Range):  Temp:  [97.5 °F (36.4 °C)-98.9 °F (37.2 °C)] 97.8 °F (36.6 °C)  Pulse:  [] 89  Resp:  [10-19] 14  SpO2:  [96 %-100 %] 100 %  BP: ()/(53-72) 94/56     Weight: 71.3 kg (157 lb 1.6 oz) (10/18/18 0401)  Body mass index is 27.83 kg/m².  Body surface area is 1.78 meters squared.    I/O last 3 completed shifts:  In: 1310 [P.O.:1310]  Out: 3555 [Urine:2250; Other:1305]    Physical Exam   Constitutional: She is oriented to person, place, and time. She appears well-developed.   Temporal and distal extremity muscle wasting; yellowing of skin  Eyes: EOM are normal. Pupils are equal, round, and reactive to light. Scleral icterus is present.   Neck: Normal range of motion.   Cardiovascular: Normal rate and regular rhythm.   No murmur heard.  Pulmonary/Chest: Effort normal and breath sounds normal. No respiratory distress. She has no wheezes.   Abdominal: Soft. Bowel sounds are normal. She exhibits distension. Chevron incision w/ staples, CDI  Musculoskeletal: Normal range of motion. She exhibits edema (BL lower extremities).   Neurological: She is alert and oriented to person, place, and time.   Skin: Skin is warm and dry.     Significant Labs:  CBC:   Recent Labs   Lab 10/18/18  1505   WBC 12.37   RBC 3.01*   HGB 9.1*   HCT 26.8*    PLT 46*   MCV 89   MCH 30.2   MCHC 34.0     CMP:   Recent Labs   Lab 10/18/18  0633 10/18/18  1505   GLU 57*  57* 161*   CALCIUM 7.5*  7.5* 7.7*   ALBUMIN 2.2*  2.2* 2.2*   PROT 3.7*  --    *  131* 131*   K 3.6  3.6 3.7   CO2 24  24 26   CL 97  97 96   BUN 62*  62* 69*   CREATININE 1.3  1.3 1.6*   ALKPHOS 122  --    ALT 43  --    AST 37  --    BILITOT 5.4*  --      All labs within the past 24 hours have been reviewed.

## 2018-10-18 NOTE — SUBJECTIVE & OBJECTIVE
"Interval HPI:   Overnight events:  Remains in TSU, BG elevated yesterday - trended down overnight.  Hypoglycemic this am (61).  CRRT overnight.  Low H&H this morning.  Prednisone 15 mg daily, standard steroid taper.  Eating:   NPO  Nausea: No  Hypoglycemia and intervention: Yes - 61, treated with D50  Fever: No  TPN and/or TF: No    /68   Pulse 103   Temp 97.8 °F (36.6 °C) (Oral)   Resp 13   Ht 5' 3" (1.6 m)   Wt 71.3 kg (157 lb 1.6 oz)   LMP  (LMP Unknown)   SpO2 100%   Breastfeeding? No   BMI 27.83 kg/m²     Labs Reviewed and Include    Recent Labs   Lab 10/18/18  0633   GLU 57*  57*   CALCIUM 7.5*  7.5*   ALBUMIN 2.2*  2.2*   PROT 3.7*   *  131*   K 3.6  3.6   CO2 24  24   CL 97  97   BUN 62*  62*   CREATININE 1.3  1.3   ALKPHOS 122   ALT 43   AST 37   BILITOT 5.4*     Lab Results   Component Value Date    WBC 8.76 10/18/2018    HGB 6.4 (L) 10/18/2018    HCT 19.3 (LL) 10/18/2018    MCV 91 10/18/2018    PLT 56 (L) 10/18/2018     No results for input(s): TSH, FREET4 in the last 168 hours.  Lab Results   Component Value Date    HGBA1C 6.0 (H) 10/04/2018       Nutritional status:   Body mass index is 27.83 kg/m².  Lab Results   Component Value Date    ALBUMIN 2.2 (L) 10/18/2018    ALBUMIN 2.2 (L) 10/18/2018    ALBUMIN 2.3 (L) 10/17/2018     Lab Results   Component Value Date    PREALBUMIN 12 (L) 09/17/2018       Estimated Creatinine Clearance: 38.7 mL/min (based on SCr of 1.3 mg/dL).    Accu-Checks  Recent Labs     10/16/18  1101 10/16/18  1223 10/16/18  1732 10/16/18  2140 10/17/18  0831 10/17/18  1356 10/17/18  1819 10/17/18  2131 10/18/18  0818 10/18/18  0842   POCTGLUCOSE 262* 293* 354* 363* 152* 238* 247* 222* 61* 148*       Current Medications and/or Treatments Impacting Glycemic Control  Immunotherapy:    Immunosuppressants         Stop Route Frequency     tacrolimus capsule 0.5 mg      -- Oral 2 times daily     mycophenolate capsule 1,000 mg      -- Oral 2 times daily    "     Steroids:   Hormones (From admission, onward)    Start     Stop Route Frequency Ordered    11/07/18 0900  predniSONE tablet 2.5 mg      11/14 0859 Oral Daily 10/17/18 0943    10/31/18 0900  predniSONE tablet 5 mg      11/07 0859 Oral Daily 10/17/18 0943    10/24/18 0900  predniSONE tablet 10 mg      10/31 0859 Oral Daily 10/17/18 0943    10/17/18 0945  predniSONE tablet 15 mg      10/24 0859 Oral Daily 10/17/18 0943        Pressors:    Autonomic Drugs (From admission, onward)    Start     Stop Route Frequency Ordered    10/10/18 1500  midodrine tablet 15 mg      -- Oral 3 times daily 10/10/18 1122    10/06/18 1617  rocuronium (ZEMURON) 10 mg/mL injection     Comments:  Created by cabinet override    10/07 0429   10/06/18 1617        Hyperglycemia/Diabetes Medications:   Antihyperglycemics (From admission, onward)    Start     Stop Route Frequency Ordered    10/17/18 2100  insulin detemir U-100 pen 13 Units      -- SubQ Nightly 10/17/18 0918    10/17/18 0800  insulin aspart U-100 pen 17 Units      -- SubQ 3 times daily with meals 10/17/18 0756    10/15/18 1819  insulin aspart U-100 pen 2 Units      -- SubQ As needed (PRN) 10/15/18 1720    10/14/18 0925  insulin aspart U-100 pen 0-5 Units      -- SubQ Before meals & nightly PRN 10/14/18 0825    10/07/18 1200  insulin regular (Humulin R) 100 Units in sodium chloride 0.9% 100 mL infusion      10/13 2100 IV Continuous 10/07/18 1276

## 2018-10-18 NOTE — INTERVAL H&P NOTE
The patient has been examined and the H&P has been reviewed:    I concur with the findings and no changes have occurred since H&P was written.     History and Exam unchanged from visit. Asked to do EGD by liver team due to dropping blood counts    Procedure - EGD  Neck - supple  Plan of anesthesia - General  ASA - per anesthesia  Mallampati - per anesthesia  Anesthesia problems - no  Family history of anesthesia problems - no      Anesthesia/Surgery risks, benefits and alternative options discussed and understood by patient/family.          Active Hospital Problems    Diagnosis  POA    *Liver transplanted [Z94.4]  Not Applicable    UTI (urinary tract infection) [N39.0]  Unknown    Acute blood loss anemia [D62]  No    EVA (acute kidney injury) [N17.9]  No     Post LT - Acute renal failure [N17.9]  No    Hypervolemia associated with renal insufficiency [E87.70, N28.9]  Unknown    Long-term use of immunosuppressant medication [Z79.899]  Not Applicable    At risk for opportunistic infections [Z91.89]  No    Metabolic acidosis [E87.2]  No    Encounter for weaning from ventilator [Z99.11]  Not Applicable    Corticosteroids adverse reaction [T38.0X5A]  Unknown    Prophylactic immunotherapy [Z29.8]  Not Applicable    Ascites [R18.8]  Yes    Physical deconditioning [R53.81]  No    Weakness [R53.1]  Yes    Hyponatremia [E87.1]  Yes    Hypotension [I95.9]  Yes    Severe malnutrition [E43]  Yes    Anemia requiring transfusions [D64.9]  Yes    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 [B18.0, K74.60]  Yes    Type 2 diabetes mellitus with diabetic polyneuropathy, with long-term current use of insulin [E11.42, Z79.4]  Not Applicable      Resolved Hospital Problems    Diagnosis Date Resolved POA    Chronic liver failure without hepatic coma [K72.10] 10/14/2018 Yes    Hypokalemia [E87.6] 10/09/2018 Yes    Liver transplant candidate [Z76.82] 10/10/2018 Not Applicable    Hepatic encephalopathy  [K72.90] 10/09/2018 Yes

## 2018-10-18 NOTE — ASSESSMENT & PLAN NOTE
- Diuresed w/ Lasix 100mg x 1 (10/12).  - CRRT 10/14.  .    -Diuresing w/ Lasix 80 BID  -SLED 10/17

## 2018-10-19 PROBLEM — K26.9 DUODENAL ULCER: Status: ACTIVE | Noted: 2018-10-19

## 2018-10-19 LAB
ALBUMIN SERPL BCP-MCNC: 2.4 G/DL
ALBUMIN SERPL BCP-MCNC: 3.4 G/DL
ALP SERPL-CCNC: 112 U/L
ALP SERPL-CCNC: 147 U/L
ALT SERPL W/O P-5'-P-CCNC: 31 U/L
ALT SERPL W/O P-5'-P-CCNC: 42 U/L
ANION GAP SERPL CALC-SCNC: 13 MMOL/L
ANION GAP SERPL CALC-SCNC: 9 MMOL/L
APTT BLDCRRT: 22.1 SEC
APTT BLDCRRT: 25.2 SEC
AST SERPL-CCNC: 26 U/L
AST SERPL-CCNC: 37 U/L
BASOPHILS # BLD AUTO: 0 K/UL
BASOPHILS # BLD AUTO: 0.01 K/UL
BASOPHILS # BLD AUTO: 0.02 K/UL
BASOPHILS # BLD AUTO: 0.02 K/UL
BASOPHILS NFR BLD: 0 %
BASOPHILS NFR BLD: 0.1 %
BASOPHILS NFR BLD: 0.2 %
BASOPHILS NFR BLD: 0.2 %
BILIRUB SERPL-MCNC: 5.3 MG/DL
BILIRUB SERPL-MCNC: 6 MG/DL
BLD PROD TYP BPU: NORMAL
BLOOD UNIT EXPIRATION DATE: NORMAL
BLOOD UNIT TYPE CODE: 600
BLOOD UNIT TYPE: NORMAL
BUN SERPL-MCNC: 81 MG/DL
BUN SERPL-MCNC: 86 MG/DL
CALCIUM SERPL-MCNC: 8.1 MG/DL
CALCIUM SERPL-MCNC: 8.2 MG/DL
CHLORIDE SERPL-SCNC: 95 MMOL/L
CHLORIDE SERPL-SCNC: 97 MMOL/L
CO2 SERPL-SCNC: 23 MMOL/L
CO2 SERPL-SCNC: 26 MMOL/L
CODING SYSTEM: NORMAL
CREAT SERPL-MCNC: 1.8 MG/DL
CREAT SERPL-MCNC: 2 MG/DL
DIFFERENTIAL METHOD: ABNORMAL
DISPENSE STATUS: NORMAL
EOSINOPHIL # BLD AUTO: 0 K/UL
EOSINOPHIL NFR BLD: 0 %
EOSINOPHIL NFR BLD: 0.1 %
EOSINOPHIL NFR BLD: 0.4 %
EOSINOPHIL NFR BLD: 0.4 %
ERYTHROCYTE [DISTWIDTH] IN BLOOD BY AUTOMATED COUNT: 16.8 %
ERYTHROCYTE [DISTWIDTH] IN BLOOD BY AUTOMATED COUNT: 17.3 %
ERYTHROCYTE [DISTWIDTH] IN BLOOD BY AUTOMATED COUNT: 17.5 %
EST. GFR  (AFRICAN AMERICAN): 28.7 ML/MIN/1.73 M^2
EST. GFR  (AFRICAN AMERICAN): 32.6 ML/MIN/1.73 M^2
EST. GFR  (NON AFRICAN AMERICAN): 24.9 ML/MIN/1.73 M^2
EST. GFR  (NON AFRICAN AMERICAN): 28.3 ML/MIN/1.73 M^2
FIBRINOGEN PPP-MCNC: 215 MG/DL
GLUCOSE SERPL-MCNC: 121 MG/DL
GLUCOSE SERPL-MCNC: 294 MG/DL
HCT VFR BLD AUTO: 20 %
HCT VFR BLD AUTO: 20 %
HCT VFR BLD AUTO: 21.3 %
HCT VFR BLD AUTO: 24.2 %
HCT VFR BLD AUTO: 27.5 %
HCT VFR BLD AUTO: 27.5 %
HGB BLD-MCNC: 6.8 G/DL
HGB BLD-MCNC: 6.8 G/DL
HGB BLD-MCNC: 7.3 G/DL
HGB BLD-MCNC: 8.3 G/DL
HGB BLD-MCNC: 9.4 G/DL
HGB BLD-MCNC: 9.4 G/DL
IMM GRANULOCYTES # BLD AUTO: 0.04 K/UL
IMM GRANULOCYTES # BLD AUTO: 0.04 K/UL
IMM GRANULOCYTES # BLD AUTO: 0.05 K/UL
IMM GRANULOCYTES # BLD AUTO: 0.09 K/UL
IMM GRANULOCYTES # BLD AUTO: 0.11 K/UL
IMM GRANULOCYTES # BLD AUTO: 0.11 K/UL
IMM GRANULOCYTES NFR BLD AUTO: 0.7 %
IMM GRANULOCYTES NFR BLD AUTO: 0.7 %
IMM GRANULOCYTES NFR BLD AUTO: 0.8 %
IMM GRANULOCYTES NFR BLD AUTO: 1.1 %
IMM GRANULOCYTES NFR BLD AUTO: 1.2 %
IMM GRANULOCYTES NFR BLD AUTO: 1.2 %
INR PPP: 1
INR PPP: 1.1
LYMPHOCYTES # BLD AUTO: 0.1 K/UL
LYMPHOCYTES # BLD AUTO: 0.2 K/UL
LYMPHOCYTES # BLD AUTO: 0.2 K/UL
LYMPHOCYTES # BLD AUTO: 0.3 K/UL
LYMPHOCYTES # BLD AUTO: 0.4 K/UL
LYMPHOCYTES # BLD AUTO: 0.4 K/UL
LYMPHOCYTES NFR BLD: 1.8 %
LYMPHOCYTES NFR BLD: 2.7 %
LYMPHOCYTES NFR BLD: 2.7 %
LYMPHOCYTES NFR BLD: 3.1 %
LYMPHOCYTES NFR BLD: 4.5 %
LYMPHOCYTES NFR BLD: 4.5 %
MAGNESIUM SERPL-MCNC: 1.5 MG/DL
MAGNESIUM SERPL-MCNC: 1.5 MG/DL
MCH RBC QN AUTO: 30 PG
MCH RBC QN AUTO: 30.1 PG
MCH RBC QN AUTO: 30.1 PG
MCH RBC QN AUTO: 30.3 PG
MCH RBC QN AUTO: 30.3 PG
MCH RBC QN AUTO: 30.4 PG
MCHC RBC AUTO-ENTMCNC: 34 G/DL
MCHC RBC AUTO-ENTMCNC: 34 G/DL
MCHC RBC AUTO-ENTMCNC: 34.2 G/DL
MCHC RBC AUTO-ENTMCNC: 34.2 G/DL
MCHC RBC AUTO-ENTMCNC: 34.3 G/DL
MCHC RBC AUTO-ENTMCNC: 34.3 G/DL
MCV RBC AUTO: 88 FL
MCV RBC AUTO: 89 FL
MONOCYTES # BLD AUTO: 0.2 K/UL
MONOCYTES # BLD AUTO: 0.6 K/UL
MONOCYTES NFR BLD: 3.2 %
MONOCYTES NFR BLD: 3.6 %
MONOCYTES NFR BLD: 3.6 %
MONOCYTES NFR BLD: 6.6 %
MONOCYTES NFR BLD: 6.6 %
MONOCYTES NFR BLD: 7.4 %
NEUTROPHILS # BLD AUTO: 5.2 K/UL
NEUTROPHILS # BLD AUTO: 5.2 K/UL
NEUTROPHILS # BLD AUTO: 5.8 K/UL
NEUTROPHILS # BLD AUTO: 7.4 K/UL
NEUTROPHILS # BLD AUTO: 8.2 K/UL
NEUTROPHILS # BLD AUTO: 8.2 K/UL
NEUTROPHILS NFR BLD: 87.1 %
NEUTROPHILS NFR BLD: 87.1 %
NEUTROPHILS NFR BLD: 88.2 %
NEUTROPHILS NFR BLD: 93 %
NEUTROPHILS NFR BLD: 93 %
NEUTROPHILS NFR BLD: 94.2 %
NRBC BLD-RTO: 0 /100 WBC
PHOSPHATE SERPL-MCNC: 3.3 MG/DL
PHOSPHATE SERPL-MCNC: 3.4 MG/DL
PLATELET # BLD AUTO: 68 K/UL
PLATELET # BLD AUTO: 68 K/UL
PLATELET # BLD AUTO: 72 K/UL
PLATELET # BLD AUTO: 82 K/UL
PLATELET # BLD AUTO: 96 K/UL
PLATELET # BLD AUTO: 96 K/UL
PMV BLD AUTO: 10.9 FL
PMV BLD AUTO: 11.5 FL
PMV BLD AUTO: 11.8 FL
POCT GLUCOSE: 129 MG/DL (ref 70–110)
POCT GLUCOSE: 139 MG/DL (ref 70–110)
POCT GLUCOSE: 182 MG/DL (ref 70–110)
POCT GLUCOSE: 210 MG/DL (ref 70–110)
POCT GLUCOSE: 242 MG/DL (ref 70–110)
POCT GLUCOSE: 256 MG/DL (ref 70–110)
POCT GLUCOSE: 278 MG/DL (ref 70–110)
POTASSIUM SERPL-SCNC: 3.6 MMOL/L
POTASSIUM SERPL-SCNC: 3.8 MMOL/L
PROT SERPL-MCNC: 4.2 G/DL
PROT SERPL-MCNC: 4.6 G/DL
PROTHROMBIN TIME: 10.6 SEC
PROTHROMBIN TIME: 11.3 SEC
RBC # BLD AUTO: 2.26 M/UL
RBC # BLD AUTO: 2.26 M/UL
RBC # BLD AUTO: 2.43 M/UL
RBC # BLD AUTO: 2.73 M/UL
RBC # BLD AUTO: 3.1 M/UL
RBC # BLD AUTO: 3.1 M/UL
SODIUM SERPL-SCNC: 131 MMOL/L
SODIUM SERPL-SCNC: 132 MMOL/L
TACROLIMUS BLD-MCNC: 5.6 NG/ML
TRANS ERYTHROCYTES VOL PATIENT: NORMAL ML
TROPONIN I SERPL DL<=0.01 NG/ML-MCNC: 0.09 NG/ML
WBC # BLD AUTO: 5.57 K/UL
WBC # BLD AUTO: 5.57 K/UL
WBC # BLD AUTO: 6.18 K/UL
WBC # BLD AUTO: 8.43 K/UL
WBC # BLD AUTO: 9.37 K/UL
WBC # BLD AUTO: 9.37 K/UL

## 2018-10-19 PROCEDURE — 99232 SBSQ HOSP IP/OBS MODERATE 35: CPT | Mod: ,,, | Performed by: INTERNAL MEDICINE

## 2018-10-19 PROCEDURE — 63600175 PHARM REV CODE 636 W HCPCS: Performed by: PHYSICIAN ASSISTANT

## 2018-10-19 PROCEDURE — 63600175 PHARM REV CODE 636 W HCPCS: Performed by: STUDENT IN AN ORGANIZED HEALTH CARE EDUCATION/TRAINING PROGRAM

## 2018-10-19 PROCEDURE — 99233 SBSQ HOSP IP/OBS HIGH 50: CPT | Mod: 24,,, | Performed by: PHYSICIAN ASSISTANT

## 2018-10-19 PROCEDURE — 20000000 HC ICU ROOM

## 2018-10-19 PROCEDURE — 25000003 PHARM REV CODE 250: Performed by: NURSE PRACTITIONER

## 2018-10-19 PROCEDURE — 93005 ELECTROCARDIOGRAM TRACING: CPT

## 2018-10-19 PROCEDURE — 20600001 HC STEP DOWN PRIVATE ROOM

## 2018-10-19 PROCEDURE — 99900035 HC TECH TIME PER 15 MIN (STAT)

## 2018-10-19 PROCEDURE — 25000003 PHARM REV CODE 250: Performed by: STUDENT IN AN ORGANIZED HEALTH CARE EDUCATION/TRAINING PROGRAM

## 2018-10-19 PROCEDURE — 94664 DEMO&/EVAL PT USE INHALER: CPT

## 2018-10-19 PROCEDURE — 80197 ASSAY OF TACROLIMUS: CPT

## 2018-10-19 PROCEDURE — 84100 ASSAY OF PHOSPHORUS: CPT

## 2018-10-19 PROCEDURE — 97530 THERAPEUTIC ACTIVITIES: CPT

## 2018-10-19 PROCEDURE — B412YZZ FLUOROSCOPY OF HEPATIC ARTERY USING OTHER CONTRAST: ICD-10-PCS | Performed by: SURGERY

## 2018-10-19 PROCEDURE — 25000003 PHARM REV CODE 250: Performed by: PHYSICIAN ASSISTANT

## 2018-10-19 PROCEDURE — 80053 COMPREHEN METABOLIC PANEL: CPT

## 2018-10-19 PROCEDURE — 84484 ASSAY OF TROPONIN QUANT: CPT

## 2018-10-19 PROCEDURE — 85610 PROTHROMBIN TIME: CPT

## 2018-10-19 PROCEDURE — 85730 THROMBOPLASTIN TIME PARTIAL: CPT

## 2018-10-19 PROCEDURE — P9021 RED BLOOD CELLS UNIT: HCPCS

## 2018-10-19 PROCEDURE — 85730 THROMBOPLASTIN TIME PARTIAL: CPT | Mod: 91

## 2018-10-19 PROCEDURE — 99232 SBSQ HOSP IP/OBS MODERATE 35: CPT | Mod: ,,, | Performed by: NURSE PRACTITIONER

## 2018-10-19 PROCEDURE — 25000003 PHARM REV CODE 250: Performed by: TRANSPLANT SURGERY

## 2018-10-19 PROCEDURE — 94761 N-INVAS EAR/PLS OXIMETRY MLT: CPT

## 2018-10-19 PROCEDURE — 80053 COMPREHEN METABOLIC PANEL: CPT | Mod: 91

## 2018-10-19 PROCEDURE — 83735 ASSAY OF MAGNESIUM: CPT

## 2018-10-19 PROCEDURE — 83735 ASSAY OF MAGNESIUM: CPT | Mod: 91

## 2018-10-19 PROCEDURE — P9047 ALBUMIN (HUMAN), 25%, 50ML: HCPCS | Mod: JG | Performed by: PHYSICIAN ASSISTANT

## 2018-10-19 PROCEDURE — C9113 INJ PANTOPRAZOLE SODIUM, VIA: HCPCS | Performed by: PHYSICIAN ASSISTANT

## 2018-10-19 PROCEDURE — 85610 PROTHROMBIN TIME: CPT | Mod: 91

## 2018-10-19 PROCEDURE — 97110 THERAPEUTIC EXERCISES: CPT

## 2018-10-19 PROCEDURE — 63600175 PHARM REV CODE 636 W HCPCS: Performed by: NURSE PRACTITIONER

## 2018-10-19 PROCEDURE — 97535 SELF CARE MNGMENT TRAINING: CPT

## 2018-10-19 PROCEDURE — 85025 COMPLETE CBC W/AUTO DIFF WBC: CPT | Mod: 91

## 2018-10-19 PROCEDURE — 85384 FIBRINOGEN ACTIVITY: CPT

## 2018-10-19 PROCEDURE — 93010 ELECTROCARDIOGRAM REPORT: CPT | Mod: ,,, | Performed by: INTERNAL MEDICINE

## 2018-10-19 PROCEDURE — 84100 ASSAY OF PHOSPHORUS: CPT | Mod: 91

## 2018-10-19 RX ORDER — HYDROCODONE BITARTRATE AND ACETAMINOPHEN 500; 5 MG/1; MG/1
TABLET ORAL
Status: DISCONTINUED | OUTPATIENT
Start: 2018-10-19 | End: 2018-10-22

## 2018-10-19 RX ORDER — LEVOFLOXACIN 250 MG/1
250 TABLET ORAL DAILY
Status: COMPLETED | OUTPATIENT
Start: 2018-10-21 | End: 2018-11-02

## 2018-10-19 RX ORDER — AMOXICILLIN 500 MG/1
500 CAPSULE ORAL EVERY 12 HOURS
Status: COMPLETED | OUTPATIENT
Start: 2018-10-19 | End: 2018-11-02

## 2018-10-19 RX ORDER — POTASSIUM CHLORIDE 750 MG/1
30 CAPSULE, EXTENDED RELEASE ORAL ONCE
Status: COMPLETED | OUTPATIENT
Start: 2018-10-19 | End: 2018-10-19

## 2018-10-19 RX ORDER — FUROSEMIDE 10 MG/ML
80 INJECTION INTRAMUSCULAR; INTRAVENOUS ONCE
Status: COMPLETED | OUTPATIENT
Start: 2018-10-19 | End: 2018-10-19

## 2018-10-19 RX ORDER — DOCUSATE SODIUM 100 MG/1
100 CAPSULE, LIQUID FILLED ORAL 3 TIMES DAILY PRN
Status: DISCONTINUED | OUTPATIENT
Start: 2018-10-19 | End: 2018-10-26

## 2018-10-19 RX ORDER — LEVOFLOXACIN 500 MG/1
500 TABLET, FILM COATED ORAL DAILY
Status: COMPLETED | OUTPATIENT
Start: 2018-10-20 | End: 2018-10-20

## 2018-10-19 RX ORDER — BISACODYL 5 MG
10 TABLET, DELAYED RELEASE (ENTERIC COATED) ORAL DAILY PRN
Status: DISCONTINUED | OUTPATIENT
Start: 2018-10-19 | End: 2018-11-16

## 2018-10-19 RX ORDER — LANOLIN ALCOHOL/MO/W.PET/CERES
400 CREAM (GRAM) TOPICAL 2 TIMES DAILY
Status: DISCONTINUED | OUTPATIENT
Start: 2018-10-19 | End: 2018-10-23

## 2018-10-19 RX ORDER — MAGNESIUM SULFATE HEPTAHYDRATE 40 MG/ML
2 INJECTION, SOLUTION INTRAVENOUS ONCE
Status: COMPLETED | OUTPATIENT
Start: 2018-10-19 | End: 2018-10-19

## 2018-10-19 RX ORDER — INSULIN ASPART 100 [IU]/ML
12 INJECTION, SOLUTION INTRAVENOUS; SUBCUTANEOUS
Status: DISCONTINUED | OUTPATIENT
Start: 2018-10-19 | End: 2018-10-21

## 2018-10-19 RX ORDER — ALBUMIN HUMAN 250 G/1000ML
25 SOLUTION INTRAVENOUS EVERY 6 HOURS
Status: COMPLETED | OUTPATIENT
Start: 2018-10-19 | End: 2018-10-19

## 2018-10-19 RX ADMIN — POTASSIUM CHLORIDE 30 MEQ: 750 CAPSULE, EXTENDED RELEASE ORAL at 10:10

## 2018-10-19 RX ADMIN — PANTOPRAZOLE SODIUM 40 MG: 40 INJECTION, POWDER, FOR SOLUTION INTRAVENOUS at 09:10

## 2018-10-19 RX ADMIN — MYCOPHENOLATE MOFETIL 1000 MG: 250 CAPSULE ORAL at 08:10

## 2018-10-19 RX ADMIN — CIPROFLOXACIN 500 MG: 500 TABLET, FILM COATED ORAL at 08:10

## 2018-10-19 RX ADMIN — MIRTAZAPINE 7.5 MG: 7.5 TABLET ORAL at 10:10

## 2018-10-19 RX ADMIN — INSULIN ASPART 1 UNITS: 100 INJECTION, SOLUTION INTRAVENOUS; SUBCUTANEOUS at 12:10

## 2018-10-19 RX ADMIN — URSODIOL 300 MG: 300 CAPSULE ORAL at 08:10

## 2018-10-19 RX ADMIN — FUROSEMIDE 80 MG: 10 INJECTION, SOLUTION INTRAMUSCULAR; INTRAVENOUS at 05:10

## 2018-10-19 RX ADMIN — ALBUMIN HUMAN 25 G: 0.25 SOLUTION INTRAVENOUS at 04:10

## 2018-10-19 RX ADMIN — URSODIOL 300 MG: 300 CAPSULE ORAL at 10:10

## 2018-10-19 RX ADMIN — INSULIN ASPART 12 UNITS: 100 INJECTION, SOLUTION INTRAVENOUS; SUBCUTANEOUS at 08:10

## 2018-10-19 RX ADMIN — MIDODRINE HYDROCHLORIDE 15 MG: 5 TABLET ORAL at 10:10

## 2018-10-19 RX ADMIN — MYCOPHENOLATE MOFETIL 1000 MG: 250 CAPSULE ORAL at 10:10

## 2018-10-19 RX ADMIN — DESMOPRESSIN ACETATE 21.39 MCG: 4 SOLUTION INTRAVENOUS at 09:10

## 2018-10-19 RX ADMIN — TACROLIMUS 1 MG: 1 CAPSULE ORAL at 05:10

## 2018-10-19 RX ADMIN — MIDODRINE HYDROCHLORIDE 15 MG: 5 TABLET ORAL at 08:10

## 2018-10-19 RX ADMIN — PANTOPRAZOLE SODIUM 40 MG: 40 INJECTION, POWDER, FOR SOLUTION INTRAVENOUS at 08:10

## 2018-10-19 RX ADMIN — FLUCONAZOLE 200 MG: 200 TABLET ORAL at 08:10

## 2018-10-19 RX ADMIN — ALBUMIN HUMAN 25 G: 0.25 SOLUTION INTRAVENOUS at 11:10

## 2018-10-19 RX ADMIN — AMOXICILLIN 500 MG: 500 CAPSULE ORAL at 10:10

## 2018-10-19 RX ADMIN — MAGNESIUM OXIDE TAB 400 MG (241.3 MG ELEMENTAL MG) 400 MG: 400 (241.3 MG) TAB at 11:10

## 2018-10-19 RX ADMIN — SODIUM BICARBONATE 650 MG TABLET 1300 MG: at 10:10

## 2018-10-19 RX ADMIN — INSULIN ASPART 2 UNITS: 100 INJECTION, SOLUTION INTRAVENOUS; SUBCUTANEOUS at 02:10

## 2018-10-19 RX ADMIN — DOCUSATE SODIUM 50 MG: 50 CAPSULE, LIQUID FILLED ORAL at 08:10

## 2018-10-19 RX ADMIN — INSULIN ASPART 12 UNITS: 100 INJECTION, SOLUTION INTRAVENOUS; SUBCUTANEOUS at 10:10

## 2018-10-19 RX ADMIN — MIDODRINE HYDROCHLORIDE 15 MG: 5 TABLET ORAL at 02:10

## 2018-10-19 RX ADMIN — SULFAMETHOXAZOLE AND TRIMETHOPRIM 1 TABLET: 400; 80 TABLET ORAL at 08:10

## 2018-10-19 RX ADMIN — INSULIN ASPART 3 UNITS: 100 INJECTION, SOLUTION INTRAVENOUS; SUBCUTANEOUS at 08:10

## 2018-10-19 RX ADMIN — MAGNESIUM OXIDE TAB 400 MG (241.3 MG ELEMENTAL MG) 400 MG: 400 (241.3 MG) TAB at 10:10

## 2018-10-19 RX ADMIN — INSULIN ASPART 12 UNITS: 100 INJECTION, SOLUTION INTRAVENOUS; SUBCUTANEOUS at 02:10

## 2018-10-19 RX ADMIN — FUROSEMIDE 80 MG: 10 INJECTION, SOLUTION INTRAMUSCULAR; INTRAVENOUS at 09:10

## 2018-10-19 RX ADMIN — SODIUM BICARBONATE 650 MG TABLET 1300 MG: at 08:10

## 2018-10-19 RX ADMIN — MAGNESIUM SULFATE IN WATER 2 G: 40 INJECTION, SOLUTION INTRAVENOUS at 09:10

## 2018-10-19 RX ADMIN — PREDNISONE 15 MG: 5 TABLET ORAL at 08:10

## 2018-10-19 RX ADMIN — FUROSEMIDE 80 MG: 10 INJECTION, SOLUTION INTRAMUSCULAR; INTRAVENOUS at 08:10

## 2018-10-19 RX ADMIN — TACROLIMUS 1 MG: 1 CAPSULE ORAL at 08:10

## 2018-10-19 NOTE — ASSESSMENT & PLAN NOTE
BG goal 140 - 180     Decrease Levemir to 4 units BID  Decrease Novolog to 12 units with meals.   Low dose correction scale insulin  BG monitoring AC/HS/0200      Discharge planning:  TBD

## 2018-10-19 NOTE — PROGRESS NOTES
Daughter translated instructions for Incentive Spirometer and Aerobika.  Patient returned demonstration and verbalized understanding of proper technique.

## 2018-10-19 NOTE — PROGRESS NOTES
[facility]  Liver Transplant  Progress Note    Patient Name: Scarlett Reddy  MRN: [id number]  Admission Date: 10/1/2018  Hospital Length of Stay: 17 days  Code Status: Full Code  Primary Care Provider: Primary Doctor No  Post-Operative Day: 14    ORGAN:   LIVER  Disease Etiology: Cirrhosis: Type B and D  Donor Type:    - Brain Death  CDC High Risk:   No  Donor CMV Status:   Donor CMV Status: Positive  Donor HBcAB:   Negative  Donor HCV Status:   Negative  Donor HBV SETH: Negative  Donor HCV SETH: Negative  Whole or Partial: Whole Liver  Biliary Anastomosis: End to End  Arterial Anatomy: Replaced Left Hepatic and Right Hepatic  Subjective:     History of Present Illness:  Ms. Reddy is a 67 y/o female with PMH of ESLD secondary Hep B and D.  Listed for liver transplant with MELD 23.  Paracentesis 10/1 with 5L removed, no fluid sent for analysis.  Morning labs significant for hyponatremia, Na 119.  Pt admitted to LTS for sodium monitoring.        Hospital Course:  Hospital Course: 67 y/o F h/o HBV/delta cirrhosis s/p DBDLT 10/5/18 (CMV D+/R+, steroid induction, MMF/tacro/pred maintenance) with take back on 10/6 2/2 hyperbilirubinema and bilious VALORIE drainage, no leak identified. Post-op course c/b hypercapneic and hypoxic respiratory failure and was reintubated though quickly extubated now doing well. Liver bx (10/6) sig for Zone 3 hemorrhage and collapse, in addition to cholestasis. Transferred from ICU on POD #4. ID consulted to comment on positive diphtheroid culture from ascitic fluid (likely skin colonization) as well as ESBL Klebsiella pneumoniae in urine culture (10/4).  Pt asymptomatic and cell count from ascitic fluid unremarkable. Per ID recs, no need to treat diphtheroids or asymptomatic ESBL Kleb pneumo bacteriuria though pt has had 6 days of therapy which would cover these organisms. Mild peritransplant ascites- repeat US 10/6 with increased arterial resistive indices. Repeat US 10/9  w/ continued elevation in RIs and fluid collections. LFTs trending down nicely. Acute gradual worsening of renal function s/p OLT.Creatinine on arrival 0.8 and has been up trending. Nephrology consulted for post-op EVA. Remains on renal/low K diet. Patient continued to be diuresed with lasix  mg (10/13) and 60 mg IV BID (10/14). On 10/14, had hyponatremia of 125 and  and underwent CRRT (10/15, 10/17). Tolerated well. Additionally, pt's Hgb low- transfused total of 1 unit pRBC (10/14, 10/16). Received 2 additional units pRBC, 1 u platelets, and DDAVP x 1 on 10/18. Pt reported dark stools 10/15 but color has normalized. No blood noted on MORELIA. FOBT+. EGD 10/18 consistent with ulcerated mucosa in duodenum s/p coagulation. Biopsy pending. Remains on Protonix 40 mg BID. Treating Klebsiella in urine w/ ciprofloxacin (started 10/17).    Interval history: Pt feeling much better this morning. H/H and platelets improved. EGD w/ findings of duodenal ulcers, biopsy pending. Will start treatment for H. Pylori at this time. Cont to monitor kidney function w/ daily labs. Receiving lasix 80 BID. Albumin x 1 given today. Good urine output. Planning for dialysis tomorrow- will talk w/ Nephrology. Denies N/V, +BM. Encouraged pt to increase nutritional intake. On renal diet. Tolerating well. No further complaints at this time.     Scheduled Meds:   albumin human 25%  25 g Intravenous Q6H    ciprofloxacin HCl  500 mg Oral Daily    entecavir  0.5 mg Oral Q72H    ergocalciferol  50,000 Units Oral Q7 Days    fluconazole  200 mg Oral Daily    furosemide  80 mg Intravenous BID    insulin aspart U-100  12 Units Subcutaneous TIDWM    insulin detemir U-100  4 Units Subcutaneous BID    magnesium oxide  400 mg Oral BID    midodrine  15 mg Oral TID    mirtazapine  7.5 mg Oral QHS    mycophenolate  1,000 mg Oral BID    pantoprazole  40 mg Intravenous BID    predniSONE  15 mg Oral Daily    Followed by    [START ON  10/24/2018] predniSONE  10 mg Oral Daily    Followed by    [START ON 10/31/2018] predniSONE  5 mg Oral Daily    Followed by    [START ON 11/7/2018] predniSONE  2.5 mg Oral Daily    sodium bicarbonate  1,300 mg Oral BID    sulfamethoxazole-trimethoprim 400-80mg  1 tablet Oral Every Mon, Wed, Fri    tacrolimus  1 mg Oral BID    ursodiol  300 mg Oral BID    valGANciclovir  450 mg Oral Twice Weekly     Continuous Infusions:  PRN Meds:bisacodyl, bisacodyl, calcium carbonate, dextrose 50%, dextrose 50%, docusate sodium, glucagon (human recombinant), glucose, glucose, insulin aspart U-100, ondansetron, oxyCODONE, oxyCODONE, polyethylene glycol, promethazine (PHENERGAN) IVPB, sodium chloride 0.9%    Review of Systems   Constitutional: Positive for activity change and appetite change. Negative for chills and fever.   Respiratory: Negative for cough and shortness of breath.    Cardiovascular: Positive for leg swelling. Negative for chest pain.   Gastrointestinal: Positive for abdominal distention and abdominal pain (along incision line). Negative for blood in stool, constipation, diarrhea, nausea and vomiting.   Genitourinary: Negative for decreased urine volume, difficulty urinating and dysuria.   Musculoskeletal: Negative for arthralgias and back pain.   Allergic/Immunologic: Positive for immunocompromised state.   Neurological: Positive for weakness. Negative for tremors and headaches.     Objective:     Vital Signs (Most Recent):  Temp: 97.7 °F (36.5 °C) (10/19/18 1249)  Pulse: 81 (10/19/18 1200)  Resp: 18 (10/19/18 1105)  BP: 121/63 (10/19/18 1105)  SpO2: 100 % (10/19/18 1200) Vital Signs (24h Range):  Temp:  [97.7 °F (36.5 °C)-98 °F (36.7 °C)] 97.7 °F (36.5 °C)  Pulse:  [] 81  Resp:  [10-18] 18  SpO2:  [97 %-100 %] 100 %  BP: ()/(56-63) 121/63     Weight: 71.3 kg (157 lb 1.6 oz)  Body mass index is 27.83 kg/m².    Intake/Output - Last 3 Shifts       10/17 0700 - 10/18 0659 10/18 0700 - 10/19 0659  10/19 0700 - 10/20 0659    P.O. 1140 660     I.V. (mL/kg) 0 (0) 100 (1.4)     Blood       Other 0 0     IV Piggyback  50     Total Intake(mL/kg) 1140 (16) 810 (11.4)     Urine (mL/kg/hr) 1350 (0.8) 1550 (0.9) 150 (0.3)    Other 1305      Stool 0 0 0    Total Output 2655 1550 150    Net -1515 -740 -150           Urine Occurrence  0 x     Stool Occurrence 0 x 4 x 1 x          Physical Exam   Constitutional: She is oriented to person, place, and time.   Temporal and distal extremity muscle wasting   Eyes: EOM are normal. Pupils are equal, round, and reactive to light. Scleral icterus is present.   Neck: Normal range of motion.   Cardiovascular: Normal rate and regular rhythm.   No murmur heard.  Pulmonary/Chest: Effort normal and breath sounds normal. No respiratory distress. She has no wheezes.   Abdominal: Soft. Bowel sounds are normal. She exhibits distension. There is tenderness (Right side ).   Chevron incision w/ staples, CDI     Musculoskeletal: Normal range of motion. She exhibits edema (BL lower extremities).   Neurological: She is alert and oriented to person, place, and time.   Skin: Skin is warm and dry.   Nursing note and vitals reviewed.      Laboratory:  Immunosuppressants         Stop Route Frequency     tacrolimus capsule 1 mg      -- Oral 2 times daily     mycophenolate capsule 1,000 mg      -- Oral 2 times daily        CBC:   Recent Labs   Lab 10/19/18  0530   WBC 9.37  9.37   RBC 3.10*  3.10*   HGB 9.4*  9.4*   HCT 27.5*  27.5*   PLT 96*  96*   MCV 89  89   MCH 30.3  30.3   MCHC 34.2  34.2     CMP:   Recent Labs   Lab 10/19/18  0530   *   CALCIUM 8.1*   ALBUMIN 2.4*   PROT 4.2*   *   K 3.6   CO2 26   CL 97   BUN 81*   CREATININE 1.8*   ALKPHOS 147*   ALT 42   AST 37   BILITOT 6.0*     Labs within the past 24 hours have been reviewed.    Diagnostic Results:  I have personally reviewed all pertinent imaging studies.    Assessment/Plan:     * Liver transplanted    -pmhx of  ESLD  secondary to hep B cirrhosis, now s/p liver transplant on 10/5, with takeback for hyperbilirubinemia and bilious VALORIE output 10/6; no biliary leak identified.   -POD 1 US: increased arterial resistive indices, suggestive of edema vs cute rejection vs congestion  -Repeat US 10/9 with continued elevation of RIs  -LFTs trending down  -T bili trending down  -repeat liver US (10/11): elevated RIs  - LFTs normalizing            Duodenal ulcer    EGD 10/18- sig for ulcerated duodenal mucosa. Biopsy pending  Cont protonix 40 BID  Start H. Pylori tx        UTI (urinary tract infection)    Urine Cx 10/15 w/ Klebsiella pneumonia  Started Ciprofloxacin 10/17  Pt asymptomatic at this time       Acute blood loss anemia    Patient has been transfused total 2 pRBC (10/14. 10/16)  Possible concern of GI bleed in setting of post transplant, and complaints of black stool.  - FOBT +  EGD 10/18            Post LT - Acute renal failure    - Creatinine on arrival 0.8 and has been up trending   BUN/cr (10/11/18): 91/1.9   Tacrolimus 26.9 (10/08)  Nephrology consulted  Renal US (10/11): no hydronephrosis  Echo 10/12: diastolic dysfunction, likely 2/2 to ARF  - pt on renal diet w 1500 ml FR.  -CRRT 10/14; SLED 10/17  -Planning to dialyze tomorrow      Hypervolemia associated with renal insufficiency    - Diuresed w/ Lasix 100mg x 1 (10/12).  - CRRT 10/14.  .    -Diuresing w/ Lasix 80 BID  -SLED 10/17  - Albumin x 1 10/18       EVA (acute kidney injury)           Metabolic acidosis    - Started Na bicarb 1300 QID.  -CRRT 10/14  -SLED 10/17         At risk for opportunistic infections    - Valcyte for CMV prophylaxis --> will start at discharge or  POD #10 in hospital  - Bactrim for PCP prophylaxis (MWF)  - Fluconazole for fungal prophylaxis        Long-term use of immunosuppressant medication    -  Cellcept/Prograf/pred  -  Held prograf 10/8 for elevated levels  -  Resumed Prograf 10/10/19. Will adjust as needed.   -  Will monitor for  signs of toxic side effects, check daily tacrolimus troughs, and change meds accordingly.       Prophylactic immunotherapy    - See long term use immunosuppresion       Ascites    - paracentesis 10/1, 5L removed, no fluid sent for analysis.  - diuresing with albumin and lasix 10/9.  - Per Nephrology, on 10/13- Lasix 80 mg bid and monitor.  No need for RRT.  -  10/14, pt w worsening edema.  , .  Nephr plan for CRRT this evening.        Weakness    - see physical decond.       Physical deconditioning    - PT/OT consulted.  - Recommend home health PT and rolling walker at discharge        Hyponatremia    - Na 119 on admit.  -Na was improving post transplant w HD.  - Pt POD #9 w EVA, Na worse today at 125.  Changed FR to 1L.  - Na stable at this time            Hypotension    - continue Midodrine 15 mg TID.   Monitor.       Anemia requiring transfusions    - pt w decreased H/H.  Transfuse 1 u PRBC 10/14 and 10/16  -10/18: transfused 2 u PRBC, 1 u platelets. DDAVP x 1   - pt c/o right side pain and dark stools (10/15)  -FOBT +  -LDH elevated, Hapto < 10  -EGD 10/18: ulcerated duodenal mucosa        Severe malnutrition    - dietary consulted.   - temporal and distal extremity muscle wasting and edema  - encourage supplements and to increase nutritional intake   - pt on renal/low K+ diet     Type 2 diabetes mellitus with diabetic polyneuropathy, with long-term current use of insulin    - continue home regimen.  - endocrine consulted.  -Pt off insulin drip at this time.  Apprec endo recs.        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  -Tx w/ Entecavir- dose changed to q72 hours given renal function.             VTE Risk Mitigation (From admission, onward)        Ordered     Place sequential compression device  Until discontinued      10/05/18 0709          The patients clinical status was discussed at multidisplinary rounds, involving transplant surgery, transplant  medicine, pharmacy, nursing, nutrition, and social work    Discharge Planning:   No Patient Care Coordination Note on file.      Jihan Soares PA-C  Liver Transplant  [facility]

## 2018-10-19 NOTE — ASSESSMENT & PLAN NOTE
EGD 10/18- sig for ulcerated duodenal mucosa. Biopsy pending  Cont protonix 40 BID  Start H. Pylori tx

## 2018-10-19 NOTE — SUBJECTIVE & OBJECTIVE
Interval History:  10/19/2018: Patient is verbal and communicative. Continues to complaint of bilateral peripheral edema up to hip region. Patient received SLED on (10/18).  UOP 1550 CC/24 hours. EGD consistent with ulcerated mucosa in duodenum s/p coagulation and patient is on Protonix 40 mg BID.     Review of patient's allergies indicates:  No Known Allergies  Current Facility-Administered Medications   Medication Frequency    bisacodyl EC tablet 10 mg QHS    bisacodyl suppository 10 mg Daily PRN    calcium carbonate 200 mg calcium (500 mg) chewable tablet 500 mg TID PRN    ciprofloxacin HCl tablet 500 mg Daily    dextrose 50% injection 12.5 g PRN    dextrose 50% injection 25 g PRN    docusate sodium capsule 50 mg TID    entecavir tablet 0.5 mg Q72H    ergocalciferol capsule 50,000 Units Q7 Days    fluconazole tablet 200 mg Daily    furosemide injection 80 mg BID    glucagon (human recombinant) injection 1 mg PRN    glucose chewable tablet 16 g PRN    glucose chewable tablet 24 g PRN    insulin aspart U-100 pen 0-5 Units QID (AC + HS) PRN    insulin aspart U-100 pen 12 Units TIDWM    insulin detemir U-100 pen 4 Units BID    magnesium oxide tablet 400 mg BID    midodrine tablet 15 mg TID    mirtazapine tablet 7.5 mg QHS    mycophenolate capsule 1,000 mg BID    ondansetron disintegrating tablet 8 mg Q8H PRN    oxyCODONE immediate release tablet 5 mg Q4H PRN    oxyCODONE immediate release tablet Tab 10 mg Q4H PRN    pantoprazole injection 40 mg BID    polyethylene glycol packet 17 g Daily PRN    predniSONE tablet 15 mg Daily    Followed by    [START ON 10/24/2018] predniSONE tablet 10 mg Daily    Followed by    [START ON 10/31/2018] predniSONE tablet 5 mg Daily    Followed by    [START ON 11/7/2018] predniSONE tablet 2.5 mg Daily    promethazine (PHENERGAN) 6.25 mg in dextrose 5 % 50 mL IVPB Q6H PRN    sodium bicarbonate tablet 1,300 mg BID    sodium chloride 0.9% flush 3 mL PRN     sulfamethoxazole-trimethoprim 400-80mg per tablet 1 tablet Every Mon, Wed, Fri    tacrolimus capsule 1 mg BID    ursodiol capsule 300 mg BID    valGANciclovir tablet 450 mg Twice Weekly       Objective:     Vital Signs (Most Recent):  Temp: 98 °F (36.7 °C) (10/19/18 0703)  Pulse: 80 (10/19/18 0756)  Resp: 18 (10/19/18 0703)  BP: (!) 112/58 (10/19/18 0703)  SpO2: 100 % (10/19/18 0703)  O2 Device (Oxygen Therapy): room air (10/19/18 0703) Vital Signs (24h Range):  Temp:  [97.5 °F (36.4 °C)-98 °F (36.7 °C)] 98 °F (36.7 °C)  Pulse:  [79-95] 80  Resp:  [10-19] 18  SpO2:  [97 %-100 %] 100 %  BP: ()/(53-58) 112/58     Weight: 71.3 kg (157 lb 1.6 oz) (10/18/18 0401)  Body mass index is 27.83 kg/m².  Body surface area is 1.78 meters squared.    I/O last 3 completed shifts:  In: 1310 [P.O.:1160; I.V.:100; IV Piggyback:50]  Out: 3705 [Urine:2400; Other:1305]    Physical Exam  Constitutional: She is oriented to person, place, and time. She appears well-developed.   Temporal and distal extremity muscle wasting; yellowing of skin  Eyes: EOM are normal. Pupils are equal, round, and reactive to light. Scleral icterus is present.   Neck: Normal range of motion.   Cardiovascular: Normal rate and regular rhythm.   No murmur heard.  Pulmonary/Chest: Effort normal and breath sounds normal. No respiratory distress. She has no wheezes.   Abdominal: Soft. Bowel sounds are normal. She exhibits distension. Chevron incision w/ staples, CDI  Musculoskeletal: Normal range of motion. She exhibits edema (BL lower extremities).   Neurological: She is alert and oriented to person, place, and time.   Skin: Skin is warm and dry.     Significant Labs:  CBC:   Recent Labs   Lab 10/19/18  0530   WBC 9.37  9.37   RBC 3.10*  3.10*   HGB 9.4*  9.4*   HCT 27.5*  27.5*   PLT 96*  96*   MCV 89  89   MCH 30.3  30.3   MCHC 34.2  34.2     CMP:   Recent Labs   Lab 10/19/18  0530   *   CALCIUM 8.1*   ALBUMIN 2.4*   PROT 4.2*   *   K  3.6   CO2 26   CL 97   BUN 81*   CREATININE 1.8*   ALKPHOS 147*   ALT 42   AST 37   BILITOT 6.0*     Recent Labs   Lab 10/15/18  1416   COLORU Stacy   SPECGRAV 1.015   PHUR 5.0   PROTEINUA Negative   NITRITE Negative   LEUKOCYTESUR Negative   UROBILINOGEN Negative     All labs within the past 24 hours have been reviewed.

## 2018-10-19 NOTE — PLAN OF CARE
Problem: Physical Therapy Goal  Goal: Physical Therapy Goal  Goals to be met by: 10/22/2018    Patient will increase functional independence with mobility by performin. Supine to sit with Contact Guard Assistance - not met  2. Sit to stand transfer with Supervision using LRAD or no AD - not met  3. Bed to chair transfer with Supervision using LRAD or no AD - not met  4. Gait  x 250 feet with Supervision using LRAD or no AD - not met  5. Stand for 3 minutes with Supervision to increase activity tolerance - not met   6. Lower extremity exercise program x15 reps per handout, with independence - not met      Outcome: Ongoing (interventions implemented as appropriate)  Goals reviewed and remain appropriate. Pt progressing towards goals.    Radha Nguyen, PT, DPT   10/19/2018  [phone]

## 2018-10-19 NOTE — PLAN OF CARE
Problem: Patient Care Overview  Goal: Plan of Care Review  Outcome: Ongoing (interventions implemented as appropriate)    Pt AAOx4 with Daughter, Gabriela, at the bedside.  Pt is jaundiced. Sclera yellow. 2+ generalized edema. Distended abdomen.  Pt on contact iso for ESBL in urine.  Pt speaks [place]. , [place], used for translation.   R IJ TLC CDI.   Upper GI scope performed yesterday. Showed bleeding duodenal ulcers. Unable to get bleeding to stop. Biopsies sent. Pt receiving V protonix.  H/H stable. Pt received 2 units of PRBC yesterday along with 1 unit of plts and DDAVP.   Serial CBCs Q6h. Most recent hg 9.4.  Pt up to bathroom with 1 assist. Pt voided >1L and had 3 BMs. Last BM with red- tinge, but pt daughter states that pt had eaten beets for dinner.   VSS. Pt on tele- SR with HR 70's-90's. ST when up to bathroom.   Chevron incision JULIANE with staples CDI. Painted with betadine.   Accuchecks AC/HS & 0200. BGs ranging from 139-210. Coverage given per insulin orders.   Self med box not up to date. Daughter did not pull meds. Self med box updated and placed in pt room.   Pt remained free from falls or injury thus far. Bed is in low/ locked position, side rails are up x 2, call light is in reach. Will continue to monitor.

## 2018-10-19 NOTE — SUBJECTIVE & OBJECTIVE
"Interval HPI:   Overnight events: Remains in TSU, BG well controlled after am hypoglycemia yesterday. This morning BG slightly below goal.  Prednisone 15 mg daily, standard steroid taper.  Eatin% - mix of hospital and food from outside.  Denies snacking but eats her meals for long periods of time.  May affect BG readings.  Nausea: No  Hypoglycemia and intervention: No  Fever: No  TPN and/or TF: No    BP (!) 112/58   Pulse 80   Temp 98 °F (36.7 °C) (Oral)   Resp 18   Ht 5' 3" (1.6 m)   Wt 71.3 kg (157 lb 1.6 oz)   LMP  (LMP Unknown)   SpO2 100%   Breastfeeding? No   BMI 27.83 kg/m²     Labs Reviewed and Include    Recent Labs   Lab 10/19/18  0530   *   CALCIUM 8.1*   ALBUMIN 2.4*   PROT 4.2*   *   K 3.6   CO2 26   CL 97   BUN 81*   CREATININE 1.8*   ALKPHOS 147*   ALT 42   AST 37   BILITOT 6.0*     Lab Results   Component Value Date    WBC 9.37 10/19/2018    WBC 9.37 10/19/2018    HGB 9.4 (L) 10/19/2018    HGB 9.4 (L) 10/19/2018    HCT 27.5 (L) 10/19/2018    HCT 27.5 (L) 10/19/2018    MCV 89 10/19/2018    MCV 89 10/19/2018    PLT 96 (L) 10/19/2018    PLT 96 (L) 10/19/2018     No results for input(s): TSH, FREET4 in the last 168 hours.  Lab Results   Component Value Date    HGBA1C 6.0 (H) 10/04/2018       Nutritional status:   Body mass index is 27.83 kg/m².  Lab Results   Component Value Date    ALBUMIN 2.4 (L) 10/19/2018    ALBUMIN 2.2 (L) 10/18/2018    ALBUMIN 2.2 (L) 10/18/2018    ALBUMIN 2.2 (L) 10/18/2018     Lab Results   Component Value Date    PREALBUMIN 12 (L) 2018       Estimated Creatinine Clearance: 27.9 mL/min (A) (based on SCr of 1.8 mg/dL (H)).    Accu-Checks  Recent Labs     10/18/18  0842 10/18/18  1116 10/18/18  1233 10/18/18  1408 10/18/18  1521 10/18/18  1523 10/18/18  2014 10/19/18  0029 10/19/18  0549 10/19/18  0802   POCTGLUCOSE 148* 79 110 140* 49* 153* 182* 210* 139* 129*       Current Medications and/or Treatments Impacting Glycemic Control  Immunotherapy:  "   Immunosuppressants         Stop Route Frequency     tacrolimus capsule 1 mg      -- Oral 2 times daily     mycophenolate capsule 1,000 mg      -- Oral 2 times daily        Steroids:   Hormones (From admission, onward)    Start     Stop Route Frequency Ordered    11/07/18 0900  predniSONE tablet 2.5 mg      11/14 0859 Oral Daily 10/17/18 0943    10/31/18 0900  predniSONE tablet 5 mg      11/07 0859 Oral Daily 10/17/18 0943    10/24/18 0900  predniSONE tablet 10 mg      10/31 0859 Oral Daily 10/17/18 0943    10/17/18 0945  predniSONE tablet 15 mg      10/24 0859 Oral Daily 10/17/18 0943        Pressors:    Autonomic Drugs (From admission, onward)    Start     Stop Route Frequency Ordered    10/10/18 1500  midodrine tablet 15 mg      -- Oral 3 times daily 10/10/18 1122    10/06/18 1617  rocuronium (ZEMURON) 10 mg/mL injection     Comments:  Created by cabinet override    10/07 0429   10/06/18 1617        Hyperglycemia/Diabetes Medications:   Antihyperglycemics (From admission, onward)    Start     Stop Route Frequency Ordered    10/18/18 2100  insulin detemir U-100 pen 5 Units      -- SubQ 2 times daily 10/18/18 1037    10/18/18 1130  insulin aspart U-100 pen 15 Units      -- SubQ 3 times daily with meals 10/18/18 1037    10/14/18 0925  insulin aspart U-100 pen 0-5 Units      -- SubQ Before meals & nightly PRN 10/14/18 0825    10/07/18 1200  insulin regular (Humulin R) 100 Units in sodium chloride 0.9% 100 mL infusion      10/13 2100 IV Continuous 10/07/18 1055

## 2018-10-19 NOTE — ASSESSMENT & PLAN NOTE
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   -POD 1 US: increased arterial resistive indices, suggestive of edema vs cute rejection vs congestion  -Repeat US 10/9 with continued elevation of RIs  -LFTs trending down  -T bili trending down  -repeat liver US (10/11): elevated RIs  - LFTs normalizing

## 2018-10-19 NOTE — PT/OT/SLP PROGRESS
Occupational Therapy   Treatment    Name: Scarlett Reddy  MRN: [id number]  Admitting Diagnosis:  Liver transplanted  1 Day Post-Op    Recommendations:     Discharge Recommendations: home health OT  Discharge Equipment Recommendations:  walker, rolling  Barriers to discharge:  None    Subjective     Communicated with: RN prior to session.  Pt agreeable to participate with therapy. Family and  present throughout session.   Pain/Comfort:  · Pain Rating 1: (abdomen pain however did not rate)  · Pain Addressed 1: Reposition, Distraction  · Pain Rating Post-Intervention 1: (did not rate)    Patients cultural, spiritual, Taoism conflicts given the current situation: none reported    Objective:     Patient found with: telemetry, pulse ox (continuous)    General Precautions: Standard, contact, fall   Orthopedic Precautions:N/A   Braces: N/A     Occupational Performance:    Bed Mobility:    · NT, pt found seated UIC upon arrival    Functional Mobility/Transfers:  · Patient completed Sit <> Stand Transfer with moderate assistance  with  rolling walker  from bedside chair ( lower surface)  · Functional Mobility: She completed functional mobility from bedside chair <> bathroom with CGA using RW. She tolerated well with no LOB or SOB.     Activities of Daily Living:  · Grooming: stand by assistance to wash hands while standing at sink    Patient left up in chair with all lines intact, call button in reach and RN notified    AMPAC 6 Click:  [facility] Total Score: 17    Treatment & Education:  -Pt edu on OT role/POC  -Importance of OOB activity with staff assistance ( UIC during each meal); provided education on edema mangement  -Safety during functional t/f and mobility  - White board updated  - Multiple self care tasks completed-- assistance level noted above  - All questions/concerns answered within OT scope of practice  - Pt completed BUE/BLE AROM exercises while seated UIC including: 1x15 reps in shoulder flexion, elbow  flexion/extension, chest press, scapular squeezes, and shoulder forward/backward rotation; hip flexion ( limited ROM 2/2 swelling and pain in abdomen), knee flexion/extension, and ankle pumps. Pt tolerated well with min rest breaks between each exercise.   Education:    Assessment:     Scarlett Reddy is a 68 y.o. female with a medical diagnosis of Liver transplanted.  She presents alert and motivated to participate with therapy.  Performance deficits affecting function are weakness, impaired endurance, impaired functional mobilty, gait instability, impaired balance, impaired self care skills, edema, decreased safety awareness, impaired cardiopulmonary response to activity.  Pt dmeo's significant swelling in BLE's this date however educated on the importance of elevation and performance of exercises for edema management. Pt is progressing well with goals however would benefit from HHOT following d/c to continue to progress and ensure safe transition home.     Rehab Prognosis:  Good; patient would benefit from acute skilled OT services to address these deficits and reach maximum level of function.       Plan:     Patient to be seen 4 x/week to address the above listed problems via self-care/home management, therapeutic activities, therapeutic exercises, neuromuscular re-education  · Plan of Care Expires: 11/07/18  · Plan of Care Reviewed with: patient, daughter    This Plan of care has been discussed with the patient who was involved in its development and understands and is in agreement with the identified goals and treatment plan    GOALS:   Multidisciplinary Problems     Occupational Therapy Goals        Problem: Occupational Therapy Goal    Goal Priority Disciplines Outcome Interventions   Occupational Therapy Goal     OT, PT/OT Ongoing (interventions implemented as appropriate)    Description:  Goals to be met by: 10/22/18     Patient will increase functional independence with ADLs by performing:    Feeding with  [place].  UE Dressing with Supervision.  LE Dressing with Supervision.  Grooming while standing at sink with Supervision.  Toileting from toilet with Supervision for hygiene and clothing management.   Toilet transfer to toilet with Supervision.                      Time Tracking:     OT Date of Treatment: 10/19/18  OT Start Time: 1049  OT Stop Time: 1120  OT Total Time (min): 31 min    Billable Minutes:Self Care/Home Management 10  Therapeutic Exercise 21    Laura corado OT  10/19/2018

## 2018-10-19 NOTE — ASSESSMENT & PLAN NOTE
-  Cellcept/Prograf/pred  -  Held prograf 10/8 for elevated levels  -  Resumed Prograf 10/10/19. Will adjust as needed.   -  Will monitor for signs of toxic side effects, check daily tacrolimus troughs, and change meds accordingly.

## 2018-10-19 NOTE — ASSESSMENT & PLAN NOTE
- Na 119 on admit.  -Na was improving post transplant w HD.  - Pt POD #9 w EVA, Na worse today at 125.  Changed FR to 1L.  - Na stable at this time

## 2018-10-19 NOTE — ASSESSMENT & PLAN NOTE
- Creatinine on arrival 0.8 and has been up trending   BUN/cr (10/11/18): 91/1.9   Tacrolimus 26.9 (10/08)  Nephrology consulted  Renal US (10/11): no hydronephrosis  Echo 10/12: diastolic dysfunction, likely 2/2 to ARF  - pt on renal diet w 1500 ml FR.  -CRRT 10/14; SLED 10/17  -Planning to dialyze tomorrow

## 2018-10-19 NOTE — PLAN OF CARE
Problem: Occupational Therapy Goal  Goal: Occupational Therapy Goal  Goals to be met by: 10/22/18     Patient will increase functional independence with ADLs by performing:    Feeding with [place].  UE Dressing with Supervision.  LE Dressing with Supervision.  Grooming while standing at sink with Supervision.  Toileting from toilet with Supervision for hygiene and clothing management.   Toilet transfer to toilet with Supervision.     Outcome: Ongoing (interventions implemented as appropriate)  Continue with POC. Pt progressing well with goals.   Laura corado OT  10/19/2018

## 2018-10-19 NOTE — PT/OT/SLP PROGRESS
"Physical Therapy Treatment    Patient Name:  Scarlett Reddy   MRN:  [id number]    Recommendations:     Discharge Recommendations:  home health PT, home health OT   Discharge Equipment Recommendations: walker, rolling   Barriers to discharge: None    Assessment:     Scarlett Reddy is a 68 y.o. female admitted with a medical diagnosis of Liver transplanted.  She presents with the following impairments/functional limitations:  weakness, gait instability, impaired endurance, impaired balance, impaired self care skills, impaired functional mobilty, edema, decreased safety awareness. Able to ambulate in hallway this date but with distance limited 2* fatigue from recent shower. Ambulated at slowed giuseppe and with mild instability noted throughout. Pt would continue to benefit from skilled acute PT in order to address current deficits and progress functional mobility.     Rehab Prognosis:  good; patient would benefit from acute skilled PT services to address these deficits and reach maximum level of function.      Recent Surgery: Procedure(s) (LRB):  EGD (ESOPHAGOGASTRODUODENOSCOPY) (N/A) 1 Day Post-Op    Plan:     During this hospitalization, patient to be seen 4 x/week to address the above listed problems via gait training, therapeutic activities, therapeutic exercises, neuromuscular re-education  · Plan of Care Expires:  11/05/18   Plan of Care Reviewed with: patient    Subjective     Communicated with RN prior to session.  Patient found UIC upon PT entry to room, agreeable to treatment.      Chief Complaint: none noted   Patient comments/goals: "Thank you very much, Radha," pt stated in English.  Pain/Comfort:  · Pain Rating 1: 0/10    Patients cultural, spiritual, Adventism conflicts given the current situation: none noted    Objective:     Patient found with: (no lines connected during session, as pt just finished taking shower)     General Precautions: Standard, fall, contact   Orthopedic Precautions:N/A   Braces: " N/A     Functional Mobility:  · Transfers:     · Sit to Stand:  minimum assistance with rolling walker and x2 reps  · Gait: 40 ft. with RW and CGA  · Decreased giuseppe, decreased step length, impaired weight-shifting ability, gait instability  · Cues for maintaining close proximity to RW       AM-PAC 6 CLICK MOBILITY  Turning over in bed (including adjusting bedclothes, sheets and blankets)?: 3  Sitting down on and standing up from a chair with arms (e.g., wheelchair, bedside commode, etc.): 3  Moving from lying on back to sitting on the side of the bed?: 3  Moving to and from a bed to a chair (including a wheelchair)?: 3  Need to walk in hospital room?: 3  Climbing 3-5 steps with a railing?: 2  Basic Mobility Total Score: 17       Therapeutic Activities and Exercises:   After ambulating to window, pt requesting to remain sitting by window. RN agreed to monitor pt and assist pt back to room. Pt left seated in locked chair in hallway with RN monitored. Pt aware of plan for RN to assist her back to room when she is ready.     Patient left up in chair with all lines intact, call button in reach and RN present..    GOALS:   Multidisciplinary Problems     Physical Therapy Goals        Problem: Physical Therapy Goal    Goal Priority Disciplines Outcome Goal Variances Interventions   Physical Therapy Goal     PT, PT/OT Ongoing (interventions implemented as appropriate)     Description:  Goals to be met by: 10/22/2018    Patient will increase functional independence with mobility by performin. Supine to sit with Contact Guard Assistance - not met  2. Sit to stand transfer with Supervision using LRAD or no AD - not met  3. Bed to chair transfer with Supervision using LRAD or no AD - not met  4. Gait  x 250 feet with Supervision using LRAD or no AD - not met  5. Stand for 3 minutes with Supervision to increase activity tolerance - not met   6. Lower extremity exercise program x15 reps per handout, with independence -  not met                       Time Tracking:     PT Received On: 10/19/18  PT Start Time: 1532     PT Stop Time: 1546  PT Total Time (min): 14 min     Billable Minutes: Therapeutic Activity 14    Treatment Type: Treatment  PT/PTA: PT     PTA Visit Number: 0     Radha Nguyen PT, DPT   10/19/2018

## 2018-10-19 NOTE — ASSESSMENT & PLAN NOTE
- pt w decreased H/H.  Transfuse 1 u PRBC 10/14 and 10/16  -10/18: transfused 2 u PRBC, 1 u platelets. DDAVP x 1   - pt c/o right side pain and dark stools (10/15)  -FOBT +  -LDH elevated, Hapto < 10  -EGD 10/18: ulcerated duodenal mucosa

## 2018-10-19 NOTE — ASSESSMENT & PLAN NOTE
Managed per primary team  Avoid hypoglycemia    Recent Labs   Lab 10/19/18  0530   ALT 42   AST 37   ALKPHOS 147*   BILITOT 6.0*   PROT 4.2*   ALBUMIN 2.4*

## 2018-10-19 NOTE — ASSESSMENT & PLAN NOTE
- Diuresed w/ Lasix 100mg x 1 (10/12).  - CRRT 10/14.  .    -Diuresing w/ Lasix 80 BID  -SLED 10/17  - Albumin x 1 10/18

## 2018-10-19 NOTE — SUBJECTIVE & OBJECTIVE
Scheduled Meds:   albumin human 25%  25 g Intravenous Q6H    ciprofloxacin HCl  500 mg Oral Daily    entecavir  0.5 mg Oral Q72H    ergocalciferol  50,000 Units Oral Q7 Days    fluconazole  200 mg Oral Daily    furosemide  80 mg Intravenous BID    insulin aspart U-100  12 Units Subcutaneous TIDWM    insulin detemir U-100  4 Units Subcutaneous BID    magnesium oxide  400 mg Oral BID    midodrine  15 mg Oral TID    mirtazapine  7.5 mg Oral QHS    mycophenolate  1,000 mg Oral BID    pantoprazole  40 mg Intravenous BID    predniSONE  15 mg Oral Daily    Followed by    [START ON 10/24/2018] predniSONE  10 mg Oral Daily    Followed by    [START ON 10/31/2018] predniSONE  5 mg Oral Daily    Followed by    [START ON 11/7/2018] predniSONE  2.5 mg Oral Daily    sodium bicarbonate  1,300 mg Oral BID    sulfamethoxazole-trimethoprim 400-80mg  1 tablet Oral Every Mon, Wed, Fri    tacrolimus  1 mg Oral BID    ursodiol  300 mg Oral BID    valGANciclovir  450 mg Oral Twice Weekly     Continuous Infusions:  PRN Meds:bisacodyl, bisacodyl, calcium carbonate, dextrose 50%, dextrose 50%, docusate sodium, glucagon (human recombinant), glucose, glucose, insulin aspart U-100, ondansetron, oxyCODONE, oxyCODONE, polyethylene glycol, promethazine (PHENERGAN) IVPB, sodium chloride 0.9%    Review of Systems   Constitutional: Positive for activity change and appetite change. Negative for chills and fever.   Respiratory: Negative for cough and shortness of breath.    Cardiovascular: Positive for leg swelling. Negative for chest pain.   Gastrointestinal: Positive for abdominal distention and abdominal pain (along incision line). Negative for blood in stool, constipation, diarrhea, nausea and vomiting.   Genitourinary: Negative for decreased urine volume, difficulty urinating and dysuria.   Musculoskeletal: Negative for arthralgias and back pain.   Allergic/Immunologic: Positive for immunocompromised state.   Neurological:  Positive for weakness. Negative for tremors and headaches.     Objective:     Vital Signs (Most Recent):  Temp: 97.7 °F (36.5 °C) (10/19/18 1249)  Pulse: 81 (10/19/18 1200)  Resp: 18 (10/19/18 1105)  BP: 121/63 (10/19/18 1105)  SpO2: 100 % (10/19/18 1200) Vital Signs (24h Range):  Temp:  [97.7 °F (36.5 °C)-98 °F (36.7 °C)] 97.7 °F (36.5 °C)  Pulse:  [] 81  Resp:  [10-18] 18  SpO2:  [97 %-100 %] 100 %  BP: ()/(56-63) 121/63     Weight: 71.3 kg (157 lb 1.6 oz)  Body mass index is 27.83 kg/m².    Intake/Output - Last 3 Shifts       10/17 0700 - 10/18 0659 10/18 0700 - 10/19 0659 10/19 0700 - 10/20 0659    P.O. 1140 660     I.V. (mL/kg) 0 (0) 100 (1.4)     Blood       Other 0 0     IV Piggyback  50     Total Intake(mL/kg) 1140 (16) 810 (11.4)     Urine (mL/kg/hr) 1350 (0.8) 1550 (0.9) 150 (0.3)    Other 1305      Stool 0 0 0    Total Output 2655 1550 150    Net -1515 -740 -150           Urine Occurrence  0 x     Stool Occurrence 0 x 4 x 1 x          Physical Exam   Constitutional: She is oriented to person, place, and time.   Temporal and distal extremity muscle wasting   Eyes: EOM are normal. Pupils are equal, round, and reactive to light. Scleral icterus is present.   Neck: Normal range of motion.   Cardiovascular: Normal rate and regular rhythm.   No murmur heard.  Pulmonary/Chest: Effort normal and breath sounds normal. No respiratory distress. She has no wheezes.   Abdominal: Soft. Bowel sounds are normal. She exhibits distension. There is tenderness (Right side ).   Chevron incision w/ staples, CDI     Musculoskeletal: Normal range of motion. She exhibits edema (BL lower extremities).   Neurological: She is alert and oriented to person, place, and time.   Skin: Skin is warm and dry.   Nursing note and vitals reviewed.      Laboratory:  Immunosuppressants         Stop Route Frequency     tacrolimus capsule 1 mg      -- Oral 2 times daily     mycophenolate capsule 1,000 mg      -- Oral 2 times daily         CBC:   Recent Labs   Lab 10/19/18  0530   WBC 9.37  9.37   RBC 3.10*  3.10*   HGB 9.4*  9.4*   HCT 27.5*  27.5*   PLT 96*  96*   MCV 89  89   MCH 30.3  30.3   MCHC 34.2  34.2     CMP:   Recent Labs   Lab 10/19/18  0530   *   CALCIUM 8.1*   ALBUMIN 2.4*   PROT 4.2*   *   K 3.6   CO2 26   CL 97   BUN 81*   CREATININE 1.8*   ALKPHOS 147*   ALT 42   AST 37   BILITOT 6.0*     Labs within the past 24 hours have been reviewed.    Diagnostic Results:  I have personally reviewed all pertinent imaging studies.

## 2018-10-19 NOTE — PROGRESS NOTES
"[facility]  Endocrinology  Progress Note    Admit Date: 10/1/2018     Reason for Consult: Management of type 2 DM, Hyperglycemia      Surgical Procedure and Date: Liver transplant 10/5/18, Ex lap 10/6/18     Diabetes diagnosis year:      Home Diabetes Medications:  Lantus 18 units HS and novolog 17 units with meals plus correction scale (150-200 +1)        Lab Results   Component Value Date     HGBA1C 6.8 (H) 2018        How often checking glucose at home? 3-4   BG readings on regimen: 120-150.  Post prandial readings as high as upper 200s  Hypoglycemia on the regimen? yes, none recently   Missed doses on regimen?  No     Diabetes Complications include:     Diabetic peripheral neuropathy      Complicating diabetes co morbidities:   CIRRHOSIS        HPI:  Patient is a 68 y.o. female with a diagnosis of ESLD, listed for liver transplant with meld 23 who underwent live transplant on 10/5/18.  Back to OR on 10/6/18 for ex lap.  Admitted 10/1/18 for hyponatremia s/p paracentesis. She speaks primarily Yi. Information obtained from recent past admission and discussion over the phone via  ([employer]). Family not at bedside. Pt request family at bedside for future conversations. Personal has known diagnosis of diabetes, endocrine consulted for diabetes management.        Interval HPI:   Overnight events: Remains in TSU, BG well controlled after am hypoglycemia yesterday. This morning BG slightly below goal.  Prednisone 15 mg daily, standard steroid taper.  Eatin% - mix of hospital and food from outside.  Denies snacking but eats her meals for long periods of time.  May affect BG readings.  Nausea: No  Hypoglycemia and intervention: No  Fever: No  TPN and/or TF: No    BP (!) 112/58   Pulse 80   Temp 98 °F (36.7 °C) (Oral)   Resp 18   Ht 5' 3" (1.6 m)   Wt 71.3 kg (157 lb 1.6 oz)   LMP  (LMP Unknown)   SpO2 100%   Breastfeeding? No   BMI 27.83 kg/m²      Labs Reviewed " and Include    Recent Labs   Lab 10/19/18  0530   *   CALCIUM 8.1*   ALBUMIN 2.4*   PROT 4.2*   *   K 3.6   CO2 26   CL 97   BUN 81*   CREATININE 1.8*   ALKPHOS 147*   ALT 42   AST 37   BILITOT 6.0*     Lab Results   Component Value Date    WBC 9.37 10/19/2018    WBC 9.37 10/19/2018    HGB 9.4 (L) 10/19/2018    HGB 9.4 (L) 10/19/2018    HCT 27.5 (L) 10/19/2018    HCT 27.5 (L) 10/19/2018    MCV 89 10/19/2018    MCV 89 10/19/2018    PLT 96 (L) 10/19/2018    PLT 96 (L) 10/19/2018     No results for input(s): TSH, FREET4 in the last 168 hours.  Lab Results   Component Value Date    HGBA1C 6.0 (H) 10/04/2018       Nutritional status:   Body mass index is 27.83 kg/m².  Lab Results   Component Value Date    ALBUMIN 2.4 (L) 10/19/2018    ALBUMIN 2.2 (L) 10/18/2018    ALBUMIN 2.2 (L) 10/18/2018    ALBUMIN 2.2 (L) 10/18/2018     Lab Results   Component Value Date    PREALBUMIN 12 (L) 09/17/2018       Estimated Creatinine Clearance: 27.9 mL/min (A) (based on SCr of 1.8 mg/dL (H)).    Accu-Checks  Recent Labs     10/18/18  0842 10/18/18  1116 10/18/18  1233 10/18/18  1408 10/18/18  1521 10/18/18  1523 10/18/18  2014 10/19/18  0029 10/19/18  0549 10/19/18  0802   POCTGLUCOSE 148* 79 110 140* 49* 153* 182* 210* 139* 129*       Current Medications and/or Treatments Impacting Glycemic Control  Immunotherapy:    Immunosuppressants         Stop Route Frequency     tacrolimus capsule 1 mg      -- Oral 2 times daily     mycophenolate capsule 1,000 mg      -- Oral 2 times daily        Steroids:   Hormones (From admission, onward)    Start     Stop Route Frequency Ordered    11/07/18 0900  predniSONE tablet 2.5 mg      11/14 0859 Oral Daily 10/17/18 0943    10/31/18 0900  predniSONE tablet 5 mg      11/07 0859 Oral Daily 10/17/18 0943    10/24/18 0900  predniSONE tablet 10 mg      10/31 0859 Oral Daily 10/17/18 0943    10/17/18 0945  predniSONE tablet 15 mg      10/24 0859 Oral Daily 10/17/18 0943        Pressors:     Autonomic Drugs (From admission, onward)    Start     Stop Route Frequency Ordered    10/10/18 1500  midodrine tablet 15 mg      -- Oral 3 times daily 10/10/18 1122    10/06/18 1617  rocuronium (ZEMURON) 10 mg/mL injection     Comments:  Created by cabinet override    10/07 0429   10/06/18 1617        Hyperglycemia/Diabetes Medications:   Antihyperglycemics (From admission, onward)    Start     Stop Route Frequency Ordered    10/18/18 2100  insulin detemir U-100 pen 5 Units      -- SubQ 2 times daily 10/18/18 1037    10/18/18 1130  insulin aspart U-100 pen 15 Units      -- SubQ 3 times daily with meals 10/18/18 1037    10/14/18 0925  insulin aspart U-100 pen 0-5 Units      -- SubQ Before meals & nightly PRN 10/14/18 0825    10/07/18 1200  insulin regular (Humulin R) 100 Units in sodium chloride 0.9% 100 mL infusion      10/13 2100 IV Continuous 10/07/18 1055          ASSESSMENT and PLAN    * Liver transplanted    Managed per primary team  Avoid hypoglycemia    Recent Labs   Lab 10/19/18  0530   ALT 42   AST 37   ALKPHOS 147*   BILITOT 6.0*   PROT 4.2*   ALBUMIN 2.4*            Type 2 diabetes mellitus with diabetic polyneuropathy, with long-term current use of insulin    BG goal 140 - 180     Decrease Levemir to 4 units BID  Decrease Novolog to 12 units with meals.   Low dose correction scale insulin  BG monitoring /HS/0200      Discharge planning:  TBD     Severe malnutrition    Oral intake encouraged, may affect BG readings     Corticosteroids adverse reaction    On standard steroid taper per transplant team; may elevate BG readings         Prophylactic immunotherapy    May increase insulin resistance.            Be Willams, KEEGAN  Endocrinology  [facility]

## 2018-10-19 NOTE — PROGRESS NOTES
[facility]  Nephrology  Progress Note    Patient Name: Scarlett Reddy  MRN: [id number]  Admission Date: 10/1/2018  Hospital Length of Stay: 17 days  Attending Provider: Cristobal Marmolejo Jr., MD   Primary Care Physician: Primary Doctor No  Principal Problem:Liver transplanted    Subjective:     HPI: 67 y/o female with ESLD due to Hep B and D cirrhosis MELD of 23 on 10/01/18.  Paracentesis 10/1 with 5L removed, no fluid sent for analysis. 10/02 significant for hyponatremia, Na 119 and she was admitted to LTS for sodium monitoring. On 10/05/18 she is s/p DBDLT (CMV D+/R+, steroid induction, MMF/tacro/pred maintenance) with take back on 10/6 secondary to hyperbilirubinema, no leak identified. Post-op course complicated by hypercapneic and hypoxic respiratory failure and was intubated 10/06 and extubated 10/07. Liver bx (10/6) sig for Zone 3 hemorrhage and collapse, in addition to cholestasis. ID consulted to comment on positive diphtheroid culture from ascitic fluid (likely skin colonization) as well as ESBL Klebsiella pneumoniae in urine culture (10/4).  Pt asymptomatic and cell count from ascitic fluid unremarkable. Mild peritransplant ascites- repeat US 10/6 with increased arterial resistive indices. Repeat US 10/9 w/ continued elevation in RIs and fluid collections. LFTs trending down nicely.     Patient is currently on entecavir 0.5 mg, Hepagam, mycophenalate 1000 mg BID, prednisone 20 mg and prophylaxis with Bactrim, fluconazole and Valgancyclovir.    Nephrology consulted for acute kidney failure is setting of Orthotopic Liver Transplant. Cr 0.8 on 10/05, started to trend up and 1.5 (10/09), 1.8 (10/10), and 1.9 (10/11). UOP 10/10/18: 520 ml. UA (10/09): 1+ occult blood and 1+ protein with RBC and amorphous casts.      Interval History:  10/19/2018: Patient is verbal and communicative. Continues to complaint of bilateral peripheral edema up to hip region. Patient received SLED on (10/18).  UOP  1550 CC/24 hours. EGD consistent with ulcerated mucosa in duodenum s/p coagulation and patient is on Protonix 40 mg BID.     Review of patient's allergies indicates:  No Known Allergies  Current Facility-Administered Medications   Medication Frequency    bisacodyl EC tablet 10 mg QHS    bisacodyl suppository 10 mg Daily PRN    calcium carbonate 200 mg calcium (500 mg) chewable tablet 500 mg TID PRN    ciprofloxacin HCl tablet 500 mg Daily    dextrose 50% injection 12.5 g PRN    dextrose 50% injection 25 g PRN    docusate sodium capsule 50 mg TID    entecavir tablet 0.5 mg Q72H    ergocalciferol capsule 50,000 Units Q7 Days    fluconazole tablet 200 mg Daily    furosemide injection 80 mg BID    glucagon (human recombinant) injection 1 mg PRN    glucose chewable tablet 16 g PRN    glucose chewable tablet 24 g PRN    insulin aspart U-100 pen 0-5 Units QID (AC + HS) PRN    insulin aspart U-100 pen 12 Units TIDWM    insulin detemir U-100 pen 4 Units BID    magnesium oxide tablet 400 mg BID    midodrine tablet 15 mg TID    mirtazapine tablet 7.5 mg QHS    mycophenolate capsule 1,000 mg BID    ondansetron disintegrating tablet 8 mg Q8H PRN    oxyCODONE immediate release tablet 5 mg Q4H PRN    oxyCODONE immediate release tablet Tab 10 mg Q4H PRN    pantoprazole injection 40 mg BID    polyethylene glycol packet 17 g Daily PRN    predniSONE tablet 15 mg Daily    Followed by    [START ON 10/24/2018] predniSONE tablet 10 mg Daily    Followed by    [START ON 10/31/2018] predniSONE tablet 5 mg Daily    Followed by    [START ON 11/7/2018] predniSONE tablet 2.5 mg Daily    promethazine (PHENERGAN) 6.25 mg in dextrose 5 % 50 mL IVPB Q6H PRN    sodium bicarbonate tablet 1,300 mg BID    sodium chloride 0.9% flush 3 mL PRN    sulfamethoxazole-trimethoprim 400-80mg per tablet 1 tablet Every Mon, Wed, Fri    tacrolimus capsule 1 mg BID    ursodiol capsule 300 mg BID    valGANciclovir tablet 450 mg  Twice Weekly       Objective:     Vital Signs (Most Recent):  Temp: 98 °F (36.7 °C) (10/19/18 0703)  Pulse: 80 (10/19/18 0756)  Resp: 18 (10/19/18 0703)  BP: (!) 112/58 (10/19/18 0703)  SpO2: 100 % (10/19/18 0703)  O2 Device (Oxygen Therapy): room air (10/19/18 0703) Vital Signs (24h Range):  Temp:  [97.5 °F (36.4 °C)-98 °F (36.7 °C)] 98 °F (36.7 °C)  Pulse:  [79-95] 80  Resp:  [10-19] 18  SpO2:  [97 %-100 %] 100 %  BP: ()/(53-58) 112/58     Weight: 71.3 kg (157 lb 1.6 oz) (10/18/18 0401)  Body mass index is 27.83 kg/m².  Body surface area is 1.78 meters squared.    I/O last 3 completed shifts:  In: 1310 [P.O.:1160; I.V.:100; IV Piggyback:50]  Out: 3705 [Urine:2400; Other:1305]    Physical Exam  Constitutional: She is oriented to person, place, and time. She appears well-developed.   Temporal and distal extremity muscle wasting; yellowing of skin  Eyes: EOM are normal. Pupils are equal, round, and reactive to light. Scleral icterus is present.   Neck: Normal range of motion.   Cardiovascular: Normal rate and regular rhythm.   No murmur heard.  Pulmonary/Chest: Effort normal and breath sounds normal. No respiratory distress. She has no wheezes.   Abdominal: Soft. Bowel sounds are normal. She exhibits distension. Chevron incision w/ staples, CDI  Musculoskeletal: Normal range of motion. She exhibits edema (BL lower extremities).   Neurological: She is alert and oriented to person, place, and time.   Skin: Skin is warm and dry.     Significant Labs:  CBC:   Recent Labs   Lab 10/19/18  0530   WBC 9.37  9.37   RBC 3.10*  3.10*   HGB 9.4*  9.4*   HCT 27.5*  27.5*   PLT 96*  96*   MCV 89  89   MCH 30.3  30.3   MCHC 34.2  34.2     CMP:   Recent Labs   Lab 10/19/18  0530   *   CALCIUM 8.1*   ALBUMIN 2.4*   PROT 4.2*   *   K 3.6   CO2 26   CL 97   BUN 81*   CREATININE 1.8*   ALKPHOS 147*   ALT 42   AST 37   BILITOT 6.0*     Recent Labs   Lab 10/15/18  1416   COLORU Stacy   SPECGRAV 1.015   PHUR 5.0    PROTEINUA Negative   NITRITE Negative   LEUKOCYTESUR Negative   UROBILINOGEN Negative     All labs within the past 24 hours have been reviewed.       Assessment/Plan:     Post LT - Acute renal failure           - acute gradual worsening of renal function s/p OLT  - 10/06 was taken to the OR for possibility of billiary leak and post op complication due to hypercapnic hypoxic respiratory failure, no blood pressure drop intraoperatively per anesthesiology notes. Patient has not been needing vasopressors to maintain BP  - Creatinine on arrival 0.8 and has been up trending   - BUN/cr (10/11/18): 91/1.9; (10/12/18): 100/2.0  - Tacrolimus 26.9 (10/08); 6.8 (10/12)  - Protein Creatine ratio: 0.82  - UA consistent with hematuria, proteinuria, RBC and amorphous casts   - Fena 0.5%; consistent with hypovolemia   - Renal US negative for obstruction, stricture or, hydronephrosis  - (10/17/18): BUN/Cr: 114/2.5   - (10/18/18): BUN/Cr: 62/1.3 s/p SLED  - (10/19/18): BUN/Cr: 81/1.8 ; Na: 132, K: 3.6, Bicarb: 26, Hb: 9.4     Assessment: Acute Renal failure in setting of hypovolemia in setting of acute gastrointestinal bleed. acute tubular necrosis from immunosuppressive agents      Plan:  - Continue fluid restriction intake max 1L/24 hours  - avoid nephrotoxic drugs, ACEI/ARB, NSAIDS  - adjust drugs to GFR: bactrim three times a week  - Strict I and O's. Monitor UOP  - No indications for acute RRT         Acute blood loss anemia  BUN/Cr: 114/2.2(10/16) -> 120/2.5(10/17). Creatinine is continuing to rise; however, BUN elevated disproportionately. Patient has been transfused 2 pRBC on (10/14) and Hb (10/16) 6.9. Transfused 1 pRBC (10/16).   -  FOBT positive  - GI consulted; EGD report: Diffuse severely friable, ulcerated mucosa with active bleeding was found in the entire duodenum. Coagulation for hemostasis using argon plasma was unsuccessful as there is diffuse oozing. proton pump inhibitor IV BID. Close monitoring H and H,  transfuse as needed with goal Hb > 7.0.   - Avoid hypotensive episodes; BP close monitoring     Thank you for your consult. I will follow-up with patient. Please contact us if you have any additional questions.     Darrel Adhikari MD   Internal Medicine PGY II  Pager: [phone]  Nephrology  [facility]

## 2018-10-19 NOTE — PLAN OF CARE
Problem: Patient Care Overview  Goal: Plan of Care Review  Outcome: Ongoing (interventions implemented as appropriate)  Pt aao x3. Vss. No acute distress. Nurse washed pt's hair and assisted patient and daughter in shower. Pt contact guard assist

## 2018-10-20 LAB
ALBUMIN SERPL BCP-MCNC: 3 G/DL
ALP SERPL-CCNC: 113 U/L
ALT SERPL W/O P-5'-P-CCNC: 30 U/L
ANION GAP SERPL CALC-SCNC: 10 MMOL/L
APTT BLDCRRT: 24 SEC
AST SERPL-CCNC: 26 U/L
BACTERIA BLD CULT: NORMAL
BASOPHILS # BLD AUTO: 0.01 K/UL
BASOPHILS NFR BLD: 0.1 %
BILIRUB SERPL-MCNC: 4.7 MG/DL
BUN SERPL-MCNC: 88 MG/DL
CALCIUM SERPL-MCNC: 8.2 MG/DL
CHLORIDE SERPL-SCNC: 97 MMOL/L
CO2 SERPL-SCNC: 25 MMOL/L
CREAT SERPL-MCNC: 1.9 MG/DL
DIFFERENTIAL METHOD: ABNORMAL
EOSINOPHIL # BLD AUTO: 0 K/UL
EOSINOPHIL # BLD AUTO: 0.1 K/UL
EOSINOPHIL # BLD AUTO: 0.1 K/UL
EOSINOPHIL NFR BLD: 0.1 %
EOSINOPHIL NFR BLD: 0.2 %
EOSINOPHIL NFR BLD: 0.4 %
EOSINOPHIL NFR BLD: 0.6 %
EOSINOPHIL NFR BLD: 0.6 %
ERYTHROCYTE [DISTWIDTH] IN BLOOD BY AUTOMATED COUNT: 16.7 %
ERYTHROCYTE [DISTWIDTH] IN BLOOD BY AUTOMATED COUNT: 17.2 %
ERYTHROCYTE [DISTWIDTH] IN BLOOD BY AUTOMATED COUNT: 17.5 %
EST. GFR  (AFRICAN AMERICAN): 30.6 ML/MIN/1.73 M^2
EST. GFR  (NON AFRICAN AMERICAN): 26.5 ML/MIN/1.73 M^2
GLUCOSE SERPL-MCNC: 203 MG/DL
HCT VFR BLD AUTO: 26.5 %
HCT VFR BLD AUTO: 26.7 %
HCT VFR BLD AUTO: 27 %
HCT VFR BLD AUTO: 27 %
HCT VFR BLD AUTO: 31.1 %
HGB BLD-MCNC: 10.4 G/DL
HGB BLD-MCNC: 8.9 G/DL
HGB BLD-MCNC: 9 G/DL
IMM GRANULOCYTES # BLD AUTO: 0.07 K/UL
IMM GRANULOCYTES # BLD AUTO: 0.09 K/UL
IMM GRANULOCYTES NFR BLD AUTO: 0.7 %
IMM GRANULOCYTES NFR BLD AUTO: 0.9 %
INR PPP: 1.1
LYMPHOCYTES # BLD AUTO: 0.2 K/UL
LYMPHOCYTES # BLD AUTO: 0.3 K/UL
LYMPHOCYTES # BLD AUTO: 0.3 K/UL
LYMPHOCYTES NFR BLD: 1.7 %
LYMPHOCYTES NFR BLD: 2.2 %
LYMPHOCYTES NFR BLD: 2.3 %
LYMPHOCYTES NFR BLD: 3.4 %
LYMPHOCYTES NFR BLD: 3.4 %
MAGNESIUM SERPL-MCNC: 1.6 MG/DL
MCH RBC QN AUTO: 29 PG
MCH RBC QN AUTO: 29 PG
MCH RBC QN AUTO: 29.1 PG
MCH RBC QN AUTO: 29.3 PG
MCH RBC QN AUTO: 29.5 PG
MCHC RBC AUTO-ENTMCNC: 33.3 G/DL
MCHC RBC AUTO-ENTMCNC: 33.3 G/DL
MCHC RBC AUTO-ENTMCNC: 33.4 G/DL
MCHC RBC AUTO-ENTMCNC: 33.6 G/DL
MCHC RBC AUTO-ENTMCNC: 33.7 G/DL
MCV RBC AUTO: 87 FL
MCV RBC AUTO: 88 FL
MONOCYTES # BLD AUTO: 0.3 K/UL
MONOCYTES # BLD AUTO: 0.3 K/UL
MONOCYTES # BLD AUTO: 0.5 K/UL
MONOCYTES NFR BLD: 2.5 %
MONOCYTES NFR BLD: 4.3 %
MONOCYTES NFR BLD: 4.8 %
MONOCYTES NFR BLD: 5.6 %
MONOCYTES NFR BLD: 5.6 %
NEUTROPHILS # BLD AUTO: 7.1 K/UL
NEUTROPHILS # BLD AUTO: 7.1 K/UL
NEUTROPHILS # BLD AUTO: 7.2 K/UL
NEUTROPHILS # BLD AUTO: 9.3 K/UL
NEUTROPHILS # BLD AUTO: 9.4 K/UL
NEUTROPHILS NFR BLD: 89.4 %
NEUTROPHILS NFR BLD: 89.4 %
NEUTROPHILS NFR BLD: 91.7 %
NEUTROPHILS NFR BLD: 92.1 %
NEUTROPHILS NFR BLD: 94.9 %
NRBC BLD-RTO: 0 /100 WBC
PHOSPHATE SERPL-MCNC: 3.4 MG/DL
PLATELET # BLD AUTO: 69 K/UL
PLATELET # BLD AUTO: 73 K/UL
PLATELET # BLD AUTO: 73 K/UL
PLATELET # BLD AUTO: 80 K/UL
PLATELET # BLD AUTO: 95 K/UL
PMV BLD AUTO: 11.1 FL
PMV BLD AUTO: 11.1 FL
PMV BLD AUTO: 11.2 FL
PMV BLD AUTO: 11.5 FL
PMV BLD AUTO: 11.8 FL
POCT GLUCOSE: 211 MG/DL (ref 70–110)
POCT GLUCOSE: 224 MG/DL (ref 70–110)
POCT GLUCOSE: 259 MG/DL (ref 70–110)
POCT GLUCOSE: 273 MG/DL (ref 70–110)
POTASSIUM SERPL-SCNC: 4.1 MMOL/L
PROT SERPL-MCNC: 4.3 G/DL
PROTHROMBIN TIME: 11.1 SEC
RBC # BLD AUTO: 3.04 M/UL
RBC # BLD AUTO: 3.05 M/UL
RBC # BLD AUTO: 3.1 M/UL
RBC # BLD AUTO: 3.1 M/UL
RBC # BLD AUTO: 3.58 M/UL
SODIUM SERPL-SCNC: 132 MMOL/L
TACROLIMUS BLD-MCNC: 7.9 NG/ML
TROPONIN I SERPL DL<=0.01 NG/ML-MCNC: 0.09 NG/ML
WBC # BLD AUTO: 10.25 K/UL
WBC # BLD AUTO: 7.84 K/UL
WBC # BLD AUTO: 7.97 K/UL
WBC # BLD AUTO: 7.97 K/UL
WBC # BLD AUTO: 9.85 K/UL

## 2018-10-20 PROCEDURE — 80197 ASSAY OF TACROLIMUS: CPT

## 2018-10-20 PROCEDURE — 90945 DIALYSIS ONE EVALUATION: CPT

## 2018-10-20 PROCEDURE — 63600175 PHARM REV CODE 636 W HCPCS: Performed by: NURSE PRACTITIONER

## 2018-10-20 PROCEDURE — 99233 SBSQ HOSP IP/OBS HIGH 50: CPT | Mod: 24,,, | Performed by: PHYSICIAN ASSISTANT

## 2018-10-20 PROCEDURE — 85730 THROMBOPLASTIN TIME PARTIAL: CPT

## 2018-10-20 PROCEDURE — 94664 DEMO&/EVAL PT USE INHALER: CPT

## 2018-10-20 PROCEDURE — 63600175 PHARM REV CODE 636 W HCPCS: Mod: JG | Performed by: STUDENT IN AN ORGANIZED HEALTH CARE EDUCATION/TRAINING PROGRAM

## 2018-10-20 PROCEDURE — 63600175 PHARM REV CODE 636 W HCPCS: Performed by: PHYSICIAN ASSISTANT

## 2018-10-20 PROCEDURE — 85610 PROTHROMBIN TIME: CPT

## 2018-10-20 PROCEDURE — 25000003 PHARM REV CODE 250: Performed by: PHYSICIAN ASSISTANT

## 2018-10-20 PROCEDURE — 25000003 PHARM REV CODE 250: Performed by: NURSE PRACTITIONER

## 2018-10-20 PROCEDURE — 85025 COMPLETE CBC W/AUTO DIFF WBC: CPT | Mod: 91

## 2018-10-20 PROCEDURE — 99233 SBSQ HOSP IP/OBS HIGH 50: CPT | Mod: ,,, | Performed by: INTERNAL MEDICINE

## 2018-10-20 PROCEDURE — 25000003 PHARM REV CODE 250: Performed by: TRANSPLANT SURGERY

## 2018-10-20 PROCEDURE — 84100 ASSAY OF PHOSPHORUS: CPT

## 2018-10-20 PROCEDURE — 83735 ASSAY OF MAGNESIUM: CPT

## 2018-10-20 PROCEDURE — 25000003 PHARM REV CODE 250: Performed by: INTERNAL MEDICINE

## 2018-10-20 PROCEDURE — 84484 ASSAY OF TROPONIN QUANT: CPT

## 2018-10-20 PROCEDURE — 63600175 PHARM REV CODE 636 W HCPCS: Performed by: STUDENT IN AN ORGANIZED HEALTH CARE EDUCATION/TRAINING PROGRAM

## 2018-10-20 PROCEDURE — 99900035 HC TECH TIME PER 15 MIN (STAT)

## 2018-10-20 PROCEDURE — 94799 UNLISTED PULMONARY SVC/PX: CPT

## 2018-10-20 PROCEDURE — 25000003 PHARM REV CODE 250: Performed by: STUDENT IN AN ORGANIZED HEALTH CARE EDUCATION/TRAINING PROGRAM

## 2018-10-20 PROCEDURE — 20000000 HC ICU ROOM

## 2018-10-20 PROCEDURE — C9113 INJ PANTOPRAZOLE SODIUM, VIA: HCPCS | Performed by: PHYSICIAN ASSISTANT

## 2018-10-20 PROCEDURE — 80053 COMPREHEN METABOLIC PANEL: CPT

## 2018-10-20 PROCEDURE — 20600001 HC STEP DOWN PRIVATE ROOM

## 2018-10-20 RX ORDER — MAGNESIUM SULFATE HEPTAHYDRATE 40 MG/ML
2 INJECTION, SOLUTION INTRAVENOUS
Status: DISCONTINUED | OUTPATIENT
Start: 2018-10-20 | End: 2018-10-21

## 2018-10-20 RX ORDER — HYDROCODONE BITARTRATE AND ACETAMINOPHEN 500; 5 MG/1; MG/1
TABLET ORAL CONTINUOUS
Status: DISCONTINUED | OUTPATIENT
Start: 2018-10-20 | End: 2018-10-21

## 2018-10-20 RX ADMIN — INSULIN DETEMIR 4 UNITS: 100 INJECTION, SOLUTION SUBCUTANEOUS at 08:10

## 2018-10-20 RX ADMIN — SODIUM BICARBONATE 650 MG TABLET 1300 MG: at 08:10

## 2018-10-20 RX ADMIN — INSULIN ASPART 12 UNITS: 100 INJECTION, SOLUTION INTRAVENOUS; SUBCUTANEOUS at 08:10

## 2018-10-20 RX ADMIN — FUROSEMIDE 80 MG: 10 INJECTION, SOLUTION INTRAMUSCULAR; INTRAVENOUS at 08:10

## 2018-10-20 RX ADMIN — INSULIN ASPART 1 UNITS: 100 INJECTION, SOLUTION INTRAVENOUS; SUBCUTANEOUS at 12:10

## 2018-10-20 RX ADMIN — TACROLIMUS 1 MG: 1 CAPSULE ORAL at 05:10

## 2018-10-20 RX ADMIN — MIRTAZAPINE 7.5 MG: 7.5 TABLET ORAL at 08:10

## 2018-10-20 RX ADMIN — AMOXICILLIN 500 MG: 500 CAPSULE ORAL at 08:10

## 2018-10-20 RX ADMIN — ALTEPLASE 2 MG: 2.2 INJECTION, POWDER, LYOPHILIZED, FOR SOLUTION INTRAVENOUS at 09:10

## 2018-10-20 RX ADMIN — MIDODRINE HYDROCHLORIDE 15 MG: 5 TABLET ORAL at 08:10

## 2018-10-20 RX ADMIN — MAGNESIUM OXIDE TAB 400 MG (241.3 MG ELEMENTAL MG) 400 MG: 400 (241.3 MG) TAB at 08:10

## 2018-10-20 RX ADMIN — PANTOPRAZOLE SODIUM 40 MG: 40 INJECTION, POWDER, FOR SOLUTION INTRAVENOUS at 08:10

## 2018-10-20 RX ADMIN — INSULIN ASPART 2 UNITS: 100 INJECTION, SOLUTION INTRAVENOUS; SUBCUTANEOUS at 03:10

## 2018-10-20 RX ADMIN — FUROSEMIDE 80 MG: 10 INJECTION, SOLUTION INTRAMUSCULAR; INTRAVENOUS at 05:10

## 2018-10-20 RX ADMIN — INSULIN ASPART 12 UNITS: 100 INJECTION, SOLUTION INTRAVENOUS; SUBCUTANEOUS at 03:10

## 2018-10-20 RX ADMIN — INSULIN DETEMIR 5 UNITS: 100 INJECTION, SOLUTION SUBCUTANEOUS at 08:10

## 2018-10-20 RX ADMIN — URSODIOL 300 MG: 300 CAPSULE ORAL at 08:10

## 2018-10-20 RX ADMIN — PREDNISONE 15 MG: 5 TABLET ORAL at 08:10

## 2018-10-20 RX ADMIN — FLUCONAZOLE 200 MG: 200 TABLET ORAL at 08:10

## 2018-10-20 RX ADMIN — MIDODRINE HYDROCHLORIDE 15 MG: 5 TABLET ORAL at 03:10

## 2018-10-20 RX ADMIN — INSULIN ASPART 2 UNITS: 100 INJECTION, SOLUTION INTRAVENOUS; SUBCUTANEOUS at 08:10

## 2018-10-20 RX ADMIN — SODIUM CHLORIDE: 0.9 INJECTION, SOLUTION INTRAVENOUS at 08:10

## 2018-10-20 RX ADMIN — TACROLIMUS 1 MG: 1 CAPSULE ORAL at 08:10

## 2018-10-20 RX ADMIN — LEVOFLOXACIN 500 MG: 500 TABLET, FILM COATED ORAL at 10:10

## 2018-10-20 RX ADMIN — OXYCODONE HYDROCHLORIDE 5 MG: 5 TABLET ORAL at 08:10

## 2018-10-20 RX ADMIN — OXYCODONE HYDROCHLORIDE 5 MG: 5 TABLET ORAL at 09:10

## 2018-10-20 RX ADMIN — MYCOPHENOLATE MOFETIL 1000 MG: 250 CAPSULE ORAL at 08:10

## 2018-10-20 RX ADMIN — INSULIN ASPART 3 UNITS: 100 INJECTION, SOLUTION INTRAVENOUS; SUBCUTANEOUS at 08:10

## 2018-10-20 NOTE — ASSESSMENT & PLAN NOTE
- Fluctuating H/H.    -Transfuse 10/14 and 10/16 (1 unit); 10/18 (2 units); 10/19 (1 unit)  -Decreased platelets -- 1 u plt given 10/18   -DDAVP given 10/18 and 10/19  -FOBT +  -LDH elevated, Hapto < 10  -EGD 10/18: ulcerated duodenal mucosa   Monitoring w/ CBC q6hr to assess the need for transfusion

## 2018-10-20 NOTE — CARE UPDATE
unavailable during rounds.    Levemir changed to 5 units in AM and 4 units q HS  Continue Novolog 12 units with meals  Low dose correction scale  BG monitoring AC/HS/0200

## 2018-10-20 NOTE — PROGRESS NOTES
[facility]  Liver Transplant  Progress Note    Patient Name: Scarlett Reddy  MRN: [id number]  Admission Date: 10/1/2018  Hospital Length of Stay: 18 days  Code Status: Full Code  Primary Care Provider: Primary Doctor No  Post-Operative Day: 15    ORGAN:   LIVER  Disease Etiology: Cirrhosis: Type B and D  Donor Type:    - Brain Death  CDC High Risk:   No  Donor CMV Status:   Donor CMV Status: Positive  Donor HBcAB:   Negative  Donor HCV Status:   Negative  Donor HBV SETH: Negative  Donor HCV SETH: Negative  Whole or Partial: Whole Liver  Biliary Anastomosis: End to End  Arterial Anatomy: Replaced Left Hepatic and Right Hepatic  Subjective:     History of Present Illness:  Ms. Reddy is a 69 y/o female with PMH of ESLD secondary Hep B and D.  Listed for liver transplant with MELD 23.  Paracentesis 10/1 with 5L removed, no fluid sent for analysis.  Morning labs significant for hyponatremia, Na 119.  Pt admitted to LTS for sodium monitoring.      Hospital Course:  Hospital Course: 69 y/o F h/o HBV/delta cirrhosis s/p DBDLT 10/5/18 (CMV D+/R+, steroid induction, MMF/tacro/pred maintenance) with take back on 10/6 2/2 hyperbilirubinema and bilious VALORIE drainage, no leak identified. Post-op course c/b hypercapneic and hypoxic respiratory failure and was reintubated though quickly extubated now doing well. Liver bx (10/6) sig for Zone 3 hemorrhage and collapse, in addition to cholestasis. Transferred from ICU on POD #4. ID consulted to comment on positive diphtheroid culture from ascitic fluid (likely skin colonization) as well as ESBL Klebsiella pneumoniae in urine culture (10/4).  Pt asymptomatic and cell count from ascitic fluid unremarkable. Per ID recs, no need to treat diphtheroids or asymptomatic ESBL Kleb pneumo bacteriuria though pt has had 6 days of therapy which would cover these organisms. Mild peritransplant ascites- repeat US 10/6 with increased arterial resistive indices. Repeat US 10/9 w/  continued elevation in RIs and fluid collections. LFTs trending down nicely.     Acute gradual worsening of renal function s/p OLT.Creatinine on arrival 0.8 and has been up trending. Nephrology consulted for post-op EVA. Remains on renal/low K diet. Patient continued to be diuresed with lasix. On 10/14, had hyponatremia of 125 and  and underwent CRRT (10/15, 10/17). Tolerated well. Additionally, pt's Hgb low- transfused 1 unit pRBC (10/14, 10/16). Hgb (10/18) 6.4, Plt 57. Tranfused 2 u pRBC, 1 u platelets, and 1 dose DDAVP.  Pt reported dark stools 10/15 but color has normalized. No blood noted on MORELIA. FOBT+. EGD 10/18 consistent with ulcerated mucosa in duodenum s/p coagulation. Biopsy pending. Concern for H. Pylori. Started Amoxicillin/levofloxacin (10/19). Remains on Protonix 40 mg BID.   Of note, Urine cx + Klebsiella pneumoniae (10/13). Received 3 day course of ciprofloxacin, dc'd 10/19.     Interval history: Overnight, pt c/o crushing chest pain- cardiac workup initiated. Troponin 0.094, likely 2/2 ischemic stress. EKG NSR. H/H (10/19) 6.8/20. Plt 6.8. Tranfused 1 u pRBC and dose of DDAVP. Repeat labs stable. Will cont close monitoring H/H, transfuse as needed with goal Hb > 7.0.  Cr improved (1.9), but BUN cont to increase. Spoke w/ Nephrology- planning to dialyze today. Denies N/V, +BM. Encouraged pt to increase nutritional intake. No further complaints at this time.     Scheduled Meds:   amoxicillin  500 mg Oral Q12H    entecavir  0.5 mg Oral Q72H    ergocalciferol  50,000 Units Oral Q7 Days    fluconazole  200 mg Oral Daily    furosemide  80 mg Intravenous BID    insulin aspart U-100  12 Units Subcutaneous TIDWM    insulin detemir U-100  4 Units Subcutaneous QHS    insulin detemir U-100  5 Units Subcutaneous Daily    [START ON 10/21/2018] levoFLOXacin  250 mg Oral Daily    magnesium oxide  400 mg Oral BID    midodrine  15 mg Oral TID    mirtazapine  7.5 mg Oral QHS    mycophenolate   1,000 mg Oral BID    pantoprazole  40 mg Intravenous BID    predniSONE  15 mg Oral Daily    Followed by    [START ON 10/24/2018] predniSONE  10 mg Oral Daily    Followed by    [START ON 10/31/2018] predniSONE  5 mg Oral Daily    Followed by    [START ON 11/7/2018] predniSONE  2.5 mg Oral Daily    sodium bicarbonate  1,300 mg Oral BID    sulfamethoxazole-trimethoprim 400-80mg  1 tablet Oral Every Mon, Wed, Fri    tacrolimus  1 mg Oral BID    ursodiol  300 mg Oral BID    valGANciclovir  450 mg Oral Twice Weekly     Continuous Infusions:  PRN Meds:sodium chloride, bisacodyl, bisacodyl, calcium carbonate, dextrose 50%, dextrose 50%, docusate sodium, glucagon (human recombinant), glucose, glucose, insulin aspart U-100, ondansetron, oxyCODONE, oxyCODONE, polyethylene glycol, promethazine (PHENERGAN) IVPB, sodium chloride 0.9%    Review of Systems   Constitutional: Positive for activity change and appetite change. Negative for chills and fever.   Respiratory: Negative for cough and shortness of breath.    Cardiovascular: Positive for leg swelling. Negative for chest pain.   Gastrointestinal: Positive for abdominal distention and abdominal pain. Negative for constipation, diarrhea and nausea.   Genitourinary: Negative for decreased urine volume, difficulty urinating and dysuria.   Musculoskeletal: Negative for arthralgias and back pain.   Allergic/Immunologic: Positive for immunocompromised state.   Neurological: Positive for weakness. Negative for dizziness and tremors.     Objective:     Vital Signs (Most Recent):  Temp: 97.5 °F (36.4 °C) (10/20/18 0745)  Pulse: 83 (10/20/18 0745)  Resp: 14 (10/20/18 0745)  BP: 105/67 (10/20/18 0745)  SpO2: 99 % (10/20/18 0745) Vital Signs (24h Range):  Temp:  [97.5 °F (36.4 °C)-98 °F (36.7 °C)] 97.5 °F (36.4 °C)  Pulse:  [] 83  Resp:  [9-18] 14  SpO2:  [97 %-100 %] 99 %  BP: ()/(60-76) 105/67     Weight: 71.3 kg (157 lb 1.6 oz)  Body mass index is 27.83  kg/m².    Intake/Output - Last 3 Shifts       10/18 0700 - 10/19 0659 10/19 0700 - 10/20 0659 10/20 0700 - 10/21 0659    P.O. 660 500     I.V. (mL/kg) 100 (1.4)      Other 0      IV Piggyback 50      Total Intake(mL/kg) 810 (11.4) 500 (7)     Urine (mL/kg/hr) 1550 (0.9) 1000 (0.6) 350 (1.3)    Other       Stool 0 0     Total Output 1550 1000 350    Net -740 -500 -350           Urine Occurrence 0 x 3 x     Stool Occurrence 4 x 5 x           Physical Exam   Constitutional: She is oriented to person, place, and time.   Temporal and distal extremity muscle wasting   Eyes: EOM are normal. Pupils are equal, round, and reactive to light. Scleral icterus is present.   Neck: Normal range of motion.   Cardiovascular: Normal rate, regular rhythm and normal heart sounds.   No murmur heard.  Pulmonary/Chest: Effort normal and breath sounds normal. No respiratory distress. She has no wheezes.   Abdominal: Soft. Bowel sounds are normal. She exhibits distension. There is tenderness.   Chevron incision w/ staples, CDI     Musculoskeletal: Normal range of motion. She exhibits edema (BL lower extremities).   Neurological: She is alert and oriented to person, place, and time.   Skin: Skin is warm and dry.   Nursing note and vitals reviewed.      Laboratory:  Immunosuppressants         Stop Route Frequency     tacrolimus capsule 1 mg      -- Oral 2 times daily     mycophenolate capsule 1,000 mg      -- Oral 2 times daily        CBC:   Recent Labs   Lab 10/20/18  0540   WBC 7.97  7.97   RBC 3.10*  3.10*   HGB 9.0*  9.0*   HCT 27.0*  27.0*   PLT 73*  73*   MCV 87  87   MCH 29.0  29.0   MCHC 33.3  33.3     CMP:   Recent Labs   Lab 10/20/18  0540   *   CALCIUM 8.2*   ALBUMIN 3.0*   PROT 4.3*   *   K 4.1   CO2 25   CL 97   BUN 88*   CREATININE 1.9*   ALKPHOS 113   ALT 30   AST 26   BILITOT 4.7*     Labs within the past 24 hours have been reviewed.    Diagnostic Results:  I have personally reviewed all pertinent imaging  studies.    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   -POD 1 US: increased arterial resistive indices, suggestive of edema vs cute rejection vs congestion  -Repeat US 10/9 with continued elevation of RIs  -LFTs trending down  -T bili trending down  -repeat liver US (10/11): elevated RIs  - LFTs normalizing            Duodenal ulcer    EGD 10/18- sig for ulcerated duodenal mucosa.   Biopsy pending  Cont protonix 40 BID  Started H. Pylori tx 10/19: Amoxicillin/Levo. Remains on PPI       UTI (urinary tract infection)    Urine Cx 10/15 w/ Klebsiella pneumonia  Started Ciprofloxacin 10/17. Dc'd 10/19. Coverage w/ h. pylori treatment  Pt asymptomatic at this time       Acute blood loss anemia    H/H cont to fluctuate.   FOBT +  EGD 10/18- showed diffuse bleeding with ulcerated duodenal mucosa   Monitoring w/ CBC q 6 to assess the need for transfusion            Post LT - Acute renal failure    - Creatinine on arrival 0.8 and trended up  -BUN/cr (10/11/18): 91/1.9   -Tacrolimus 26.9 (10/08)  Nephrology consulted  Renal US (10/11): no hydronephrosis  Echo 10/12: diastolic dysfunction, likely 2/2 to ARF  - pt on renal diet w 1500 ml FR.  -CRRT 10/14; SLED 10/17  -Dialysis today     Hypervolemia associated with renal insufficiency    - Diuresed w/ Lasix 100mg x 1 (10/12).  - CRRT 10/14.  .    -SLED 10/17  -Diuresing w/ Lasix 80 BID and albumin as needed       EVA (acute kidney injury)           Metabolic acidosis    - Started Na bicarb 1300 QID.  -CRRT 10/14  -SLED 10/17         At risk for opportunistic infections    - Valcyte for CMV prophylaxis  - Bactrim for PCP prophylaxis (MWF)  - Fluconazole for fungal prophylaxis        Long-term use of immunosuppressant medication    -  Cellcept/Prograf/pred  -  Held prograf 10/8 for elevated levels  -   Resumed Prograf 10/10/19. Will adjust as needed.   -  Will monitor for signs of toxic side effects, check daily tacrolimus troughs, and change meds accordingly.       Prophylactic immunotherapy    - See long term use immunosuppresion       Ascites    - paracentesis 10/1, 5L removed, no fluid sent for analysis.  - diuresing with albumin and lasix 10/9.  - Per Nephrology, on 10/13- Lasix 80 mg bid and monitor.  No need for RRT.  -  10/14, pt w worsening edema.  , .  Nephr plan for CRRT this evening.        Weakness    - see physical decond.       Physical deconditioning    - PT/OT consulted.  - Recommend home health PT and rolling walker at discharge        Hyponatremia    - Na 119 on admit.  -Na was improving post transplant w HD.  - Pt POD #9 w EVA  - Na stable at this time. Monitoring w/ daily labs            Hypotension    - continue Midodrine 15 mg TID.   Monitor.       Anemia requiring transfusions    - Fluctuating H/H.    -Transfuse 10/14 and 10/16 (1 unit); 10/18 (2 units); 10/19 (1 unit)  -Decreased platelets -- 1 u plt given 10/18   -DDAVP given 10/18 and 10/19  -FOBT +  -LDH elevated, Hapto < 10  -EGD 10/18: ulcerated duodenal mucosa   Monitoring w/ CBC q6hr to assess the need for transfusion       Severe malnutrition    - dietary consulted.   - temporal and distal extremity muscle wasting and edema  - encourage supplements and to increase nutritional intake   - pt on renal/low K+ diet     Type 2 diabetes mellitus with diabetic polyneuropathy, with long-term current use of insulin    - continue home regimen.  - endocrine consulted.  -Pt off insulin drip at this time.  Apprec endo recs.        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  -Tx w/ Entecavir- dose changed to q72 hours given renal function.             VTE Risk Mitigation (From admission, onward)        Ordered     Place sequential compression device  Until discontinued      10/05/18 0709           The patients clinical status was discussed at multidisplinary rounds, involving transplant surgery, transplant medicine, pharmacy, nursing, nutrition, and social work    Discharge Planning:  No Patient Care Coordination Note on file.      Jihan Soares PA-C  Liver Transplant  [facility]

## 2018-10-20 NOTE — SUBJECTIVE & OBJECTIVE
Scheduled Meds:   amoxicillin  500 mg Oral Q12H    entecavir  0.5 mg Oral Q72H    ergocalciferol  50,000 Units Oral Q7 Days    fluconazole  200 mg Oral Daily    furosemide  80 mg Intravenous BID    insulin aspart U-100  12 Units Subcutaneous TIDWM    insulin detemir U-100  4 Units Subcutaneous QHS    insulin detemir U-100  5 Units Subcutaneous Daily    [START ON 10/21/2018] levoFLOXacin  250 mg Oral Daily    magnesium oxide  400 mg Oral BID    midodrine  15 mg Oral TID    mirtazapine  7.5 mg Oral QHS    mycophenolate  1,000 mg Oral BID    pantoprazole  40 mg Intravenous BID    predniSONE  15 mg Oral Daily    Followed by    [START ON 10/24/2018] predniSONE  10 mg Oral Daily    Followed by    [START ON 10/31/2018] predniSONE  5 mg Oral Daily    Followed by    [START ON 11/7/2018] predniSONE  2.5 mg Oral Daily    sodium bicarbonate  1,300 mg Oral BID    sulfamethoxazole-trimethoprim 400-80mg  1 tablet Oral Every Mon, Wed, Fri    tacrolimus  1 mg Oral BID    ursodiol  300 mg Oral BID    valGANciclovir  450 mg Oral Twice Weekly     Continuous Infusions:  PRN Meds:sodium chloride, bisacodyl, bisacodyl, calcium carbonate, dextrose 50%, dextrose 50%, docusate sodium, glucagon (human recombinant), glucose, glucose, insulin aspart U-100, ondansetron, oxyCODONE, oxyCODONE, polyethylene glycol, promethazine (PHENERGAN) IVPB, sodium chloride 0.9%    Review of Systems   Constitutional: Positive for activity change and appetite change. Negative for chills and fever.   Respiratory: Negative for cough and shortness of breath.    Cardiovascular: Positive for leg swelling. Negative for chest pain.   Gastrointestinal: Positive for abdominal distention and abdominal pain. Negative for constipation, diarrhea and nausea.   Genitourinary: Negative for decreased urine volume, difficulty urinating and dysuria.   Musculoskeletal: Negative for arthralgias and back pain.   Allergic/Immunologic: Positive for  immunocompromised state.   Neurological: Positive for weakness. Negative for dizziness and tremors.     Objective:     Vital Signs (Most Recent):  Temp: 97.5 °F (36.4 °C) (10/20/18 0745)  Pulse: 83 (10/20/18 0745)  Resp: 14 (10/20/18 0745)  BP: 105/67 (10/20/18 0745)  SpO2: 99 % (10/20/18 0745) Vital Signs (24h Range):  Temp:  [97.5 °F (36.4 °C)-98 °F (36.7 °C)] 97.5 °F (36.4 °C)  Pulse:  [] 83  Resp:  [9-18] 14  SpO2:  [97 %-100 %] 99 %  BP: ()/(60-76) 105/67     Weight: 71.3 kg (157 lb 1.6 oz)  Body mass index is 27.83 kg/m².    Intake/Output - Last 3 Shifts       10/18 0700 - 10/19 0659 10/19 0700 - 10/20 0659 10/20 0700 - 10/21 0659    P.O. 660 500     I.V. (mL/kg) 100 (1.4)      Other 0      IV Piggyback 50      Total Intake(mL/kg) 810 (11.4) 500 (7)     Urine (mL/kg/hr) 1550 (0.9) 1000 (0.6) 350 (1.3)    Other       Stool 0 0     Total Output 1550 1000 350    Net -740 -500 -350           Urine Occurrence 0 x 3 x     Stool Occurrence 4 x 5 x           Physical Exam   Constitutional: She is oriented to person, place, and time.   Temporal and distal extremity muscle wasting   Eyes: EOM are normal. Pupils are equal, round, and reactive to light. Scleral icterus is present.   Neck: Normal range of motion.   Cardiovascular: Normal rate, regular rhythm and normal heart sounds.   No murmur heard.  Pulmonary/Chest: Effort normal and breath sounds normal. No respiratory distress. She has no wheezes.   Abdominal: Soft. Bowel sounds are normal. She exhibits distension. There is tenderness.   Chevron incision w/ staples, CDI     Musculoskeletal: Normal range of motion. She exhibits edema (BL lower extremities).   Neurological: She is alert and oriented to person, place, and time.   Skin: Skin is warm and dry.   Nursing note and vitals reviewed.      Laboratory:  Immunosuppressants         Stop Route Frequency     tacrolimus capsule 1 mg      -- Oral 2 times daily     mycophenolate capsule 1,000 mg      -- Oral  2 times daily        CBC:   Recent Labs   Lab 10/20/18  0540   WBC 7.97  7.97   RBC 3.10*  3.10*   HGB 9.0*  9.0*   HCT 27.0*  27.0*   PLT 73*  73*   MCV 87  87   MCH 29.0  29.0   MCHC 33.3  33.3     CMP:   Recent Labs   Lab 10/20/18  0540   *   CALCIUM 8.2*   ALBUMIN 3.0*   PROT 4.3*   *   K 4.1   CO2 25   CL 97   BUN 88*   CREATININE 1.9*   ALKPHOS 113   ALT 30   AST 26   BILITOT 4.7*     Labs within the past 24 hours have been reviewed.    Diagnostic Results:  I have personally reviewed all pertinent imaging studies.

## 2018-10-20 NOTE — PROGRESS NOTES
"Around 5:40 PM last night, pt began with c/o "crushing chest pain".  12 Lead, Chest Xray, Cardiac Labs ordered. Additional 80 mg IV lasix ordered as well.   12 lead EKG showed NSR.  CXR showing "interval development of a hazy opacification of the pulmonary problem parenchyma possibly representing effusions.".  Troponin's elevated, but MD on call for abdominal transplant, Dr. Hall, stated most likely is 2/2 stress ischemia.     Hg & hct had also dropped to 6.8 & 20, so 1 unit of PRBCs was ordered.  Plts 68.Dose of DDAVP also ordered and given.     After RBCs and DDAVP h&h now 9& 26.7 and plts 80.     Chest pain is now resolved and pt resting.     "

## 2018-10-20 NOTE — PROGRESS NOTES
[facility]  Nephrology  Progress Note    Patient Name: Scarlett Reddy  MRN: [id number]  Admission Date: 10/1/2018  Hospital Length of Stay: 18 days  Attending Provider: Cristobal Marmolejo Jr., MD   Primary Care Physician: Primary Doctor No  Principal Problem:Liver transplanted    Subjective:     HPI: 67 y/o female with ESLD due to Hep B and D cirrhosis MELD of 23 on 10/01/18.  Paracentesis 10/1 with 5L removed, no fluid sent for analysis. 10/02 significant for hyponatremia, Na 119 and she was admitted to LTS for sodium monitoring. On 10/05/18 she is s/p DBDLT (CMV D+/R+, steroid induction, MMF/tacro/pred maintenance) with take back on 10/6 secondary to hyperbilirubinema, no leak identified. Post-op course complicated by hypercapneic and hypoxic respiratory failure and was intubated 10/06 and extubated 10/07. Liver bx (10/6) sig for Zone 3 hemorrhage and collapse, in addition to cholestasis. ID consulted to comment on positive diphtheroid culture from ascitic fluid (likely skin colonization) as well as ESBL Klebsiella pneumoniae in urine culture (10/4).  Pt asymptomatic and cell count from ascitic fluid unremarkable. Mild peritransplant ascites- repeat US 10/6 with increased arterial resistive indices. Repeat US 10/9 w/ continued elevation in RIs and fluid collections. LFTs trending down nicely.     Patient is currently on entecavir 0.5 mg, Hepagam, mycophenalate 1000 mg BID, prednisone 20 mg and prophylaxis with Bactrim, fluconazole and Valgancyclovir.    Nephrology consulted for acute kidney failure is setting of Orthotopic Liver Transplant. Cr 0.8 on 10/05, started to trend up and 1.5 (10/09), 1.8 (10/10), and 1.9 (10/11). UOP 10/10/18: 520 ml. UA (10/09): 1+ occult blood and 1+ protein with RBC and amorphous casts.      Interval History: 10/20/2018: Patient is verbal and communicative. Continues to complaint of bilateral peripheral edema up to hip region. Patient received SLED on10/14 and 10/18.   UOP 1000 CC/24 hours. Overnight, pt c/o crushing chest pain- cardiac workup initiated. Troponin 0.094, likely 2/2 ischemic stress. EKG NSR. H/H (10/19) 6.8/20. Plt 6.8. Tranfused 1 u pRBC and dose of DDAVP. Repeat labs stable.  Cr improved (1.9), but BUN cont to increase. Will continue with SLED for 6 hours today (10/20). Denies N/V, +BM. Encouraged pt to increase nutritional intake. No further complaints at this time    Review of patient's allergies indicates:  No Known Allergies  Current Facility-Administered Medications   Medication Frequency    0.9%  NaCl infusion (CRRT USE ONLY) Continuous    0.9%  NaCl infusion (for blood administration) Q24H PRN    amoxicillin capsule 500 mg Q12H    bisacodyl EC tablet 10 mg Daily PRN    bisacodyl suppository 10 mg Daily PRN    calcium carbonate 200 mg calcium (500 mg) chewable tablet 500 mg TID PRN    dextrose 50% injection 12.5 g PRN    dextrose 50% injection 25 g PRN    docusate sodium capsule 100 mg TID PRN    entecavir tablet 0.5 mg Q72H    ergocalciferol capsule 50,000 Units Q7 Days    fluconazole tablet 200 mg Daily    furosemide injection 80 mg BID    glucagon (human recombinant) injection 1 mg PRN    glucose chewable tablet 16 g PRN    glucose chewable tablet 24 g PRN    insulin aspart U-100 pen 0-5 Units QID (AC + HS) PRN    insulin aspart U-100 pen 12 Units TIDWM    insulin detemir U-100 pen 4 Units QHS    insulin detemir U-100 pen 5 Units Daily    [START ON 10/21/2018] levoFLOXacin tablet 250 mg Daily    magnesium oxide tablet 400 mg BID    magnesium sulfate 2g in water 50mL IVPB (premix) PRN    midodrine tablet 15 mg TID    mirtazapine tablet 7.5 mg QHS    mycophenolate capsule 1,000 mg BID    ondansetron disintegrating tablet 8 mg Q8H PRN    oxyCODONE immediate release tablet 5 mg Q4H PRN    oxyCODONE immediate release tablet Tab 10 mg Q4H PRN    pantoprazole injection 40 mg BID    polyethylene glycol packet 17 g Daily PRN     predniSONE tablet 15 mg Daily    Followed by    [START ON 10/24/2018] predniSONE tablet 10 mg Daily    Followed by    [START ON 10/31/2018] predniSONE tablet 5 mg Daily    Followed by    [START ON 11/7/2018] predniSONE tablet 2.5 mg Daily    promethazine (PHENERGAN) 6.25 mg in dextrose 5 % 50 mL IVPB Q6H PRN    sodium bicarbonate tablet 1,300 mg BID    sodium chloride 0.9% flush 3 mL PRN    sodium phosphate 20.01 mmol in dextrose 5 % 250 mL IVPB PRN    sodium phosphate 30 mmol in dextrose 5 % 250 mL IVPB PRN    sodium phosphate 39.99 mmol in dextrose 5 % 250 mL IVPB PRN    sulfamethoxazole-trimethoprim 400-80mg per tablet 1 tablet Every Mon, Wed, Fri    tacrolimus capsule 1 mg BID    ursodiol capsule 300 mg BID    valGANciclovir tablet 450 mg Twice Weekly       Objective:     Vital Signs (Most Recent):  Temp: 98.2 °F (36.8 °C) (10/20/18 1103)  Pulse: 93 (10/20/18 1142)  Resp: 13 (10/20/18 1142)  BP: 109/63 (10/20/18 1139)  SpO2: 99 % (10/20/18 1142)  O2 Device (Oxygen Therapy): room air (10/20/18 1142) Vital Signs (24h Range):  Temp:  [97.5 °F (36.4 °C)-98.2 °F (36.8 °C)] 98.2 °F (36.8 °C)  Pulse:  [] 93  Resp:  [9-18] 13  SpO2:  [97 %-100 %] 99 %  BP: ()/(60-76) 109/63     Weight: 71.3 kg (157 lb 1.6 oz) (10/18/18 0401)  Body mass index is 27.83 kg/m².  Body surface area is 1.78 meters squared.    I/O last 3 completed shifts:  In: 860 [P.O.:860]  Out: 2250 [Urine:2250]    Physical Exam   Constitutional: She is oriented to person, place, and time. She appears well-developed.   Temporal and distal extremity muscle wasting; yellowing of skin  Eyes: EOM are normal. Pupils are equal, round, and reactive to light. Scleral icterus is present.   Neck: Normal range of motion.   Cardiovascular: Normal rate and regular rhythm.   No murmur heard.  Pulmonary/Chest: Effort normal and breath sounds normal. No respiratory distress. She has no wheezes.   Abdominal: Soft. Bowel sounds are normal. She  exhibits distension. Chevron incision w/ staples, CDI  Musculoskeletal: Normal range of motion. She exhibits edema (BL lower extremities).   Neurological: She is alert and oriented to person, place, and time.   Skin: Skin is warm and dry.    Significant Labs:  CBC:   Recent Labs   Lab 10/20/18  1208   WBC 7.84   RBC 3.04*   HGB 8.9*   HCT 26.5*   PLT 69*   MCV 87   MCH 29.3   MCHC 33.6     CMP:   Recent Labs   Lab 10/20/18  0540   *   CALCIUM 8.2*   ALBUMIN 3.0*   PROT 4.3*   *   K 4.1   CO2 25   CL 97   BUN 88*   CREATININE 1.9*   ALKPHOS 113   ALT 30   AST 26   BILITOT 4.7*     Recent Labs   Lab 10/15/18  1416   COLORU Stacy   SPECGRAV 1.015   PHUR 5.0   PROTEINUA Negative   NITRITE Negative   LEUKOCYTESUR Negative   UROBILINOGEN Negative     All labs within the past 24 hours have been reviewed.       Assessment/Plan:     Post LT - Acute renal failure             - acute gradual worsening of renal function s/p OLT  - 10/06 was taken to the OR for possibility of billiary leak and post op complication due to hypercapnic hypoxic respiratory failure, no blood pressure drop intraoperatively per anesthesiology notes. Patient has not been needing vasopressors to maintain BP  - Creatinine on arrival 0.8 and has been up trending   - BUN/cr (10/11/18): 91/1.9; (10/12/18): 100/2.0  - Tacrolimus 26.9 (10/08); 6.8 (10/12)  - Protein Creatine ratio: 0.82  - UA consistent with hematuria, proteinuria, RBC and amorphous casts   - Fena 0.5%; consistent with hypovolemia   - Renal US negative for obstruction, stricture or, hydronephrosis  - (10/17/18): BUN/Cr: 114/2.5   - (10/18/18): BUN/Cr: 62/1.3 s/p SLED  - (10/19/18): BUN/Cr: 81/1.8 ; Na: 132, K: 3.6, Bicarb: 26, Hb: 9.4  - (10/20/18): BUN/Cr: 88/1.9; Na 132; K 4.1, Bicarb 25   Overnight, pt c/o crushing chest pain- cardiac workup initiated. Troponin 0.094, likely 2/2 ischemic stress. EKG NSR. H/H (10/19) 6.8/20. Plt 6.8. Tranfused 1 u pRBC and dose of  DDAVP.    Assessment: Acute Renal failure in setting of hypovolemia and acute gastrointestinal bleed. possibly acute tubular necrosis from immunosuppressive agents 10/12 levels tacrolimus > 25     Plan:  - Continue fluid restriction intake max 1L/24 hours  - will proceed with SLED for 6 hours (10/20/18) in setting of uremia and possible contribution of platelet dysfunction concern by liver transplant team in setting of acute GI bleed (lesion found on the EGD)  - follow RFT's closely and clinical status  - avoid nephrotoxic drugs, ACEI/ARB, NSAIDS  - adjust drugs to GFR: bactrim three times a week  - Strict I and O's. Monitor UOP         Acute blood loss anemia  BUN/Cr: 114/2.2(10/16) -> 120/2.5(10/17). Creatinine is continuing to rise; however, BUN elevated disproportionately. Patient has been transfused 2 pRBC on (10/14) and Hb (10/16) 6.9. Transfused 1 pRBC (10/16). Hb 6.8 (`10/20) transfusing 1 unit pRBC   -  FOBT positive  - EGD report: Diffuse severely friable, ulcerated mucosa with active bleeding was found in the entire duodenum. Coagulation for hemostasis using argon plasma was unsuccessful as there is diffuse oozing. proton pump inhibitor IV BID. Close monitoring H and H, transfuse as needed with goal Hb > 7.0.   - Avoid hypotensive episodes; BP close monitoring     Thank you for your consult. I will follow-up with patient. Please contact us if you have any additional questions.     Darrel Adhikari MD   Internal Medicine PGY II  Pager: [phone]  Nephrology  [facility]

## 2018-10-20 NOTE — ASSESSMENT & PLAN NOTE
H/H cont to fluctuate.   FOBT +  EGD 10/18- showed diffuse bleeding with ulcerated duodenal mucosa   Monitoring w/ CBC q 6 to assess the need for transfusion

## 2018-10-20 NOTE — ASSESSMENT & PLAN NOTE
- Creatinine on arrival 0.8 and trended up  -BUN/cr (10/11/18): 91/1.9   -Tacrolimus 26.9 (10/08)  Nephrology consulted  Renal US (10/11): no hydronephrosis  Echo 10/12: diastolic dysfunction, likely 2/2 to ARF  - pt on renal diet w 1500 ml FR.  -CRRT 10/14; SLED 10/17  -Dialysis today

## 2018-10-20 NOTE — ASSESSMENT & PLAN NOTE
- Na 119 on admit.  -Na was improving post transplant w HD.  - Pt POD #9 w EVA  - Na stable at this time. Monitoring w/ daily labs

## 2018-10-20 NOTE — ASSESSMENT & PLAN NOTE
- Valcyte for CMV prophylaxis  - Bactrim for PCP prophylaxis (MWF)  - Fluconazole for fungal prophylaxis

## 2018-10-20 NOTE — ASSESSMENT & PLAN NOTE
- Diuresed w/ Lasix 100mg x 1 (10/12).  - CRRT 10/14.  .    -SLED 10/17  -Diuresing w/ Lasix 80 BID and albumin as needed

## 2018-10-20 NOTE — ASSESSMENT & PLAN NOTE
EGD 10/18- sig for ulcerated duodenal mucosa.   Biopsy pending  Cont protonix 40 BID  Started H. Pylori tx 10/19: Amoxicillin/Levo. Remains on PPI

## 2018-10-20 NOTE — SUBJECTIVE & OBJECTIVE
"Interval HPI:   Overnight events: Remains on TSU, BG elevated overnight, AM BG slightly above goal.  Prednisone 15 mg daily, standard steroid taper. Chest pain reported overnight - EKG, CXR, lasix, 1 unit PRBCs, and DDAVP given overnight.   Eating:   {Blank single:64185::"100%","50%","<25%","NPO"}  Nausea: No  Hypoglycemia and intervention: No  Fever: No  TPN and/or TF: No    /69   Pulse 85   Temp 98 °F (36.7 °C) (Oral)   Resp 16   Ht 5' 3" (1.6 m)   Wt 71.3 kg (157 lb 1.6 oz)   LMP  (LMP Unknown)   SpO2 98%   Breastfeeding? No   BMI 27.83 kg/m²     Labs Reviewed and Include    Recent Labs   Lab 10/20/18  0540   *   CALCIUM 8.2*   ALBUMIN 3.0*   PROT 4.3*   *   K 4.1   CO2 25   CL 97   BUN 88*   CREATININE 1.9*   ALKPHOS 113   ALT 30   AST 26   BILITOT 4.7*     Lab Results   Component Value Date    WBC 10.25 10/19/2018    HGB 9.0 (L) 10/19/2018    HCT 26.7 (L) 10/19/2018    MCV 88 10/19/2018    PLT 80 (L) 10/19/2018     No results for input(s): TSH, FREET4 in the last 168 hours.  Lab Results   Component Value Date    HGBA1C 6.0 (H) 10/04/2018       Nutritional status:   Body mass index is 27.83 kg/m².  Lab Results   Component Value Date    ALBUMIN 3.0 (L) 10/20/2018    ALBUMIN 3.4 (L) 10/19/2018    ALBUMIN 2.4 (L) 10/19/2018     Lab Results   Component Value Date    PREALBUMIN 12 (L) 09/17/2018       Estimated Creatinine Clearance: 26.5 mL/min (A) (based on SCr of 1.9 mg/dL (H)).    Accu-Checks  Recent Labs     10/18/18  1521 10/18/18  1523 10/18/18  2014 10/19/18  0029 10/19/18  0549 10/19/18  0802 10/19/18  1307 10/19/18  1629 10/19/18  2014 10/20/18  0020   POCTGLUCOSE 49* 153* 182* 210* 139* 129* 242* 278* 256* 259*       Current Medications and/or Treatments Impacting Glycemic Control  Immunotherapy:    Immunosuppressants         Stop Route Frequency     tacrolimus capsule 1 mg      -- Oral 2 times daily     mycophenolate capsule 1,000 mg      -- Oral 2 times daily        Steroids: "   Hormones (From admission, onward)    Start     Stop Route Frequency Ordered    11/07/18 0900  predniSONE tablet 2.5 mg      11/14 0859 Oral Daily 10/17/18 0943    10/31/18 0900  predniSONE tablet 5 mg      11/07 0859 Oral Daily 10/17/18 0943    10/24/18 0900  predniSONE tablet 10 mg      10/31 0859 Oral Daily 10/17/18 0943    10/17/18 0945  predniSONE tablet 15 mg      10/24 0859 Oral Daily 10/17/18 0943        Pressors:    Autonomic Drugs (From admission, onward)    Start     Stop Route Frequency Ordered    10/10/18 1500  midodrine tablet 15 mg      -- Oral 3 times daily 10/10/18 1122    10/06/18 1617  rocuronium (ZEMURON) 10 mg/mL injection     Comments:  Created by cabinet override    10/07 0429   10/06/18 1617        Hyperglycemia/Diabetes Medications:   Antihyperglycemics (From admission, onward)    Start     Stop Route Frequency Ordered    10/19/18 1130  insulin aspart U-100 pen 12 Units      -- SubQ 3 times daily with meals 10/19/18 0844    10/19/18 0900  insulin detemir U-100 pen 4 Units      -- SubQ 2 times daily 10/19/18 0844    10/14/18 0925  insulin aspart U-100 pen 0-5 Units      -- SubQ Before meals & nightly PRN 10/14/18 0825    10/07/18 1200  insulin regular (Humulin R) 100 Units in sodium chloride 0.9% 100 mL infusion      10/13 2100 IV Continuous 10/07/18 1055

## 2018-10-20 NOTE — ASSESSMENT & PLAN NOTE
Urine Cx 10/15 w/ Klebsiella pneumonia  Started Ciprofloxacin 10/17. Dc'd 10/19. Coverage w/ h. pylori treatment  Pt asymptomatic at this time

## 2018-10-20 NOTE — PLAN OF CARE
Problem: Patient Care Overview  Goal: Plan of Care Review  - Patient is AAOx4, ambulates with 1 person assistance and walker. Patient educated to use call bell for assistance, verbalized understanding. Patient's daughter at bedside, attentive to patient's needs.   - Telemetry monitoring SR HR 80's - 90's   - Afebrile, WBC 7.84   - Continuing CBC q 6 - noon H/H stable 8.9/26.5   - Chevron incision JULIANE with staples - painted with betadine   - CR 1.9 - continuing 80 mg IV lasix BID - urine output 1,050 cc so far this shift. Plan for CRRT tonight.   - Accuchecks AC/HS/0200 - mealtime and SSI administered per order - appetite OK.   - Patient complained of abdominal pain once so far this shift - PRN oxycodone administered with complete relief noted.   - See flow sheet for complete assessment details

## 2018-10-20 NOTE — ASSESSMENT & PLAN NOTE
BG goal 140 - 180     Levemir 5 units in AM and 4 units in PM  Increase Novolog to 14 units with meals.   Low dose correction scale insulin  BG monitoring AC/HS/0200      Discharge planning:  TBD

## 2018-10-20 NOTE — SUBJECTIVE & OBJECTIVE
Interval History: 10/20/2018: Patient is verbal and communicative. Continues to complaint of bilateral peripheral edema up to hip region. Patient received SLED on10/14 and 10/18.  UOP 1000 CC/24 hours. Overnight, pt c/o crushing chest pain- cardiac workup initiated. Troponin 0.094, likely 2/2 ischemic stress. EKG NSR. H/H (10/19) 6.8/20. Plt 6.8. Tranfused 1 u pRBC and dose of DDAVP. Repeat labs stable.  Cr improved (1.9), but BUN cont to increase. Will continue with SLED for 6 hours today (10/20). Denies N/V, +BM. Encouraged pt to increase nutritional intake. No further complaints at this time    Review of patient's allergies indicates:  No Known Allergies  Current Facility-Administered Medications   Medication Frequency    0.9%  NaCl infusion (CRRT USE ONLY) Continuous    0.9%  NaCl infusion (for blood administration) Q24H PRN    amoxicillin capsule 500 mg Q12H    bisacodyl EC tablet 10 mg Daily PRN    bisacodyl suppository 10 mg Daily PRN    calcium carbonate 200 mg calcium (500 mg) chewable tablet 500 mg TID PRN    dextrose 50% injection 12.5 g PRN    dextrose 50% injection 25 g PRN    docusate sodium capsule 100 mg TID PRN    entecavir tablet 0.5 mg Q72H    ergocalciferol capsule 50,000 Units Q7 Days    fluconazole tablet 200 mg Daily    furosemide injection 80 mg BID    glucagon (human recombinant) injection 1 mg PRN    glucose chewable tablet 16 g PRN    glucose chewable tablet 24 g PRN    insulin aspart U-100 pen 0-5 Units QID (AC + HS) PRN    insulin aspart U-100 pen 12 Units TIDWM    insulin detemir U-100 pen 4 Units QHS    insulin detemir U-100 pen 5 Units Daily    [START ON 10/21/2018] levoFLOXacin tablet 250 mg Daily    magnesium oxide tablet 400 mg BID    magnesium sulfate 2g in water 50mL IVPB (premix) PRN    midodrine tablet 15 mg TID    mirtazapine tablet 7.5 mg QHS    mycophenolate capsule 1,000 mg BID    ondansetron disintegrating tablet 8 mg Q8H PRN    oxyCODONE  immediate release tablet 5 mg Q4H PRN    oxyCODONE immediate release tablet Tab 10 mg Q4H PRN    pantoprazole injection 40 mg BID    polyethylene glycol packet 17 g Daily PRN    predniSONE tablet 15 mg Daily    Followed by    [START ON 10/24/2018] predniSONE tablet 10 mg Daily    Followed by    [START ON 10/31/2018] predniSONE tablet 5 mg Daily    Followed by    [START ON 11/7/2018] predniSONE tablet 2.5 mg Daily    promethazine (PHENERGAN) 6.25 mg in dextrose 5 % 50 mL IVPB Q6H PRN    sodium bicarbonate tablet 1,300 mg BID    sodium chloride 0.9% flush 3 mL PRN    sodium phosphate 20.01 mmol in dextrose 5 % 250 mL IVPB PRN    sodium phosphate 30 mmol in dextrose 5 % 250 mL IVPB PRN    sodium phosphate 39.99 mmol in dextrose 5 % 250 mL IVPB PRN    sulfamethoxazole-trimethoprim 400-80mg per tablet 1 tablet Every Mon, Wed, Fri    tacrolimus capsule 1 mg BID    ursodiol capsule 300 mg BID    valGANciclovir tablet 450 mg Twice Weekly       Objective:     Vital Signs (Most Recent):  Temp: 98.2 °F (36.8 °C) (10/20/18 1103)  Pulse: 93 (10/20/18 1142)  Resp: 13 (10/20/18 1142)  BP: 109/63 (10/20/18 1139)  SpO2: 99 % (10/20/18 1142)  O2 Device (Oxygen Therapy): room air (10/20/18 1142) Vital Signs (24h Range):  Temp:  [97.5 °F (36.4 °C)-98.2 °F (36.8 °C)] 98.2 °F (36.8 °C)  Pulse:  [] 93  Resp:  [9-18] 13  SpO2:  [97 %-100 %] 99 %  BP: ()/(60-76) 109/63     Weight: 71.3 kg (157 lb 1.6 oz) (10/18/18 0401)  Body mass index is 27.83 kg/m².  Body surface area is 1.78 meters squared.    I/O last 3 completed shifts:  In: 860 [P.O.:860]  Out: 2250 [Urine:2250]    Physical Exam   Constitutional: She is oriented to person, place, and time. She appears well-developed.   Temporal and distal extremity muscle wasting; yellowing of skin  Eyes: EOM are normal. Pupils are equal, round, and reactive to light. Scleral icterus is present.   Neck: Normal range of motion.   Cardiovascular: Normal rate and regular  rhythm.   No murmur heard.  Pulmonary/Chest: Effort normal and breath sounds normal. No respiratory distress. She has no wheezes.   Abdominal: Soft. Bowel sounds are normal. She exhibits distension. Chevron incision w/ staples, CDI  Musculoskeletal: Normal range of motion. She exhibits edema (BL lower extremities).   Neurological: She is alert and oriented to person, place, and time.   Skin: Skin is warm and dry.    Significant Labs:  CBC:   Recent Labs   Lab 10/20/18  1208   WBC 7.84   RBC 3.04*   HGB 8.9*   HCT 26.5*   PLT 69*   MCV 87   MCH 29.3   MCHC 33.6     CMP:   Recent Labs   Lab 10/20/18  0540   *   CALCIUM 8.2*   ALBUMIN 3.0*   PROT 4.3*   *   K 4.1   CO2 25   CL 97   BUN 88*   CREATININE 1.9*   ALKPHOS 113   ALT 30   AST 26   BILITOT 4.7*     Recent Labs   Lab 10/15/18  1416   COLORU Stacy   SPECGRAV 1.015   PHUR 5.0   PROTEINUA Negative   NITRITE Negative   LEUKOCYTESUR Negative   UROBILINOGEN Negative     All labs within the past 24 hours have been reviewed.

## 2018-10-21 LAB
ALBUMIN SERPL BCP-MCNC: 3 G/DL
ALP SERPL-CCNC: 132 U/L
ALT SERPL W/O P-5'-P-CCNC: 33 U/L
ANION GAP SERPL CALC-SCNC: 12 MMOL/L
APTT BLDCRRT: 24.5 SEC
AST SERPL-CCNC: 26 U/L
BACTERIA BLD CULT: NORMAL
BACTERIA BLD CULT: NORMAL
BASOPHILS # BLD AUTO: 0.01 K/UL
BASOPHILS # BLD AUTO: 0.02 K/UL
BASOPHILS NFR BLD: 0.1 %
BASOPHILS NFR BLD: 0.2 %
BILIRUB SERPL-MCNC: 3.8 MG/DL
BUN SERPL-MCNC: 40 MG/DL
CALCIUM SERPL-MCNC: 8.4 MG/DL
CHLORIDE SERPL-SCNC: 98 MMOL/L
CO2 SERPL-SCNC: 24 MMOL/L
CREAT SERPL-MCNC: 1 MG/DL
DIFFERENTIAL METHOD: ABNORMAL
EOSINOPHIL # BLD AUTO: 0 K/UL
EOSINOPHIL # BLD AUTO: 0.1 K/UL
EOSINOPHIL # BLD AUTO: 0.1 K/UL
EOSINOPHIL NFR BLD: 0 %
EOSINOPHIL NFR BLD: 0.2 %
EOSINOPHIL NFR BLD: 0.4 %
EOSINOPHIL NFR BLD: 0.9 %
EOSINOPHIL NFR BLD: 0.9 %
ERYTHROCYTE [DISTWIDTH] IN BLOOD BY AUTOMATED COUNT: 17.5 %
ERYTHROCYTE [DISTWIDTH] IN BLOOD BY AUTOMATED COUNT: 17.9 %
ERYTHROCYTE [DISTWIDTH] IN BLOOD BY AUTOMATED COUNT: 17.9 %
ERYTHROCYTE [DISTWIDTH] IN BLOOD BY AUTOMATED COUNT: 18.2 %
ERYTHROCYTE [DISTWIDTH] IN BLOOD BY AUTOMATED COUNT: 18.2 %
EST. GFR  (AFRICAN AMERICAN): >60 ML/MIN/1.73 M^2
EST. GFR  (NON AFRICAN AMERICAN): 57.6 ML/MIN/1.73 M^2
GLUCOSE SERPL-MCNC: 151 MG/DL
HCT VFR BLD AUTO: 28.8 %
HCT VFR BLD AUTO: 29.3 %
HCT VFR BLD AUTO: 30.3 %
HCT VFR BLD AUTO: 31.5 %
HCT VFR BLD AUTO: 31.5 %
HGB BLD-MCNC: 10 G/DL
HGB BLD-MCNC: 10.5 G/DL
HGB BLD-MCNC: 10.5 G/DL
HGB BLD-MCNC: 9.5 G/DL
HGB BLD-MCNC: 9.5 G/DL
IMM GRANULOCYTES # BLD AUTO: 0.05 K/UL
IMM GRANULOCYTES # BLD AUTO: 0.06 K/UL
IMM GRANULOCYTES # BLD AUTO: 0.08 K/UL
IMM GRANULOCYTES # BLD AUTO: 0.11 K/UL
IMM GRANULOCYTES # BLD AUTO: 0.11 K/UL
IMM GRANULOCYTES NFR BLD AUTO: 0.6 %
IMM GRANULOCYTES NFR BLD AUTO: 0.6 %
IMM GRANULOCYTES NFR BLD AUTO: 0.9 %
IMM GRANULOCYTES NFR BLD AUTO: 1.1 %
IMM GRANULOCYTES NFR BLD AUTO: 1.1 %
INR PPP: 1
LYMPHOCYTES # BLD AUTO: 0.2 K/UL
LYMPHOCYTES # BLD AUTO: 0.4 K/UL
LYMPHOCYTES # BLD AUTO: 0.4 K/UL
LYMPHOCYTES NFR BLD: 1.8 %
LYMPHOCYTES NFR BLD: 2.2 %
LYMPHOCYTES NFR BLD: 2.7 %
LYMPHOCYTES NFR BLD: 3.6 %
LYMPHOCYTES NFR BLD: 3.6 %
MAGNESIUM SERPL-MCNC: 1.6 MG/DL
MCH RBC QN AUTO: 29.1 PG
MCH RBC QN AUTO: 29.3 PG
MCH RBC QN AUTO: 29.7 PG
MCHC RBC AUTO-ENTMCNC: 32.4 G/DL
MCHC RBC AUTO-ENTMCNC: 33 G/DL
MCHC RBC AUTO-ENTMCNC: 33 G/DL
MCHC RBC AUTO-ENTMCNC: 33.3 G/DL
MCHC RBC AUTO-ENTMCNC: 33.3 G/DL
MCV RBC AUTO: 88 FL
MCV RBC AUTO: 90 FL
MCV RBC AUTO: 90 FL
MONOCYTES # BLD AUTO: 0.3 K/UL
MONOCYTES # BLD AUTO: 0.3 K/UL
MONOCYTES # BLD AUTO: 0.4 K/UL
MONOCYTES # BLD AUTO: 0.6 K/UL
MONOCYTES # BLD AUTO: 0.6 K/UL
MONOCYTES NFR BLD: 3.6 %
MONOCYTES NFR BLD: 3.7 %
MONOCYTES NFR BLD: 3.9 %
MONOCYTES NFR BLD: 5.5 %
MONOCYTES NFR BLD: 5.5 %
NEUTROPHILS # BLD AUTO: 7.6 K/UL
NEUTROPHILS # BLD AUTO: 8.3 K/UL
NEUTROPHILS # BLD AUTO: 9 K/UL
NEUTROPHILS # BLD AUTO: 9 K/UL
NEUTROPHILS # BLD AUTO: 9.1 K/UL
NEUTROPHILS NFR BLD: 88.8 %
NEUTROPHILS NFR BLD: 88.8 %
NEUTROPHILS NFR BLD: 92.5 %
NEUTROPHILS NFR BLD: 93 %
NEUTROPHILS NFR BLD: 93.5 %
NRBC BLD-RTO: 0 /100 WBC
PHOSPHATE SERPL-MCNC: 2.7 MG/DL
PLATELET # BLD AUTO: 48 K/UL
PLATELET # BLD AUTO: 51 K/UL
PLATELET # BLD AUTO: 66 K/UL
PMV BLD AUTO: 11.2 FL
PMV BLD AUTO: 11.2 FL
PMV BLD AUTO: 11.6 FL
PMV BLD AUTO: 12.2 FL
PMV BLD AUTO: 12.3 FL
POCT GLUCOSE: 178 MG/DL (ref 70–110)
POCT GLUCOSE: 195 MG/DL (ref 70–110)
POCT GLUCOSE: 224 MG/DL (ref 70–110)
POCT GLUCOSE: 227 MG/DL (ref 70–110)
POTASSIUM SERPL-SCNC: 3 MMOL/L
PROT SERPL-MCNC: 4.7 G/DL
PROTHROMBIN TIME: 10.9 SEC
RBC # BLD AUTO: 3.2 M/UL
RBC # BLD AUTO: 3.24 M/UL
RBC # BLD AUTO: 3.44 M/UL
RBC # BLD AUTO: 3.58 M/UL
RBC # BLD AUTO: 3.58 M/UL
SODIUM SERPL-SCNC: 134 MMOL/L
TACROLIMUS BLD-MCNC: 9.6 NG/ML
WBC # BLD AUTO: 10.07 K/UL
WBC # BLD AUTO: 10.07 K/UL
WBC # BLD AUTO: 8.2 K/UL
WBC # BLD AUTO: 8.97 K/UL
WBC # BLD AUTO: 9.75 K/UL

## 2018-10-21 PROCEDURE — 83735 ASSAY OF MAGNESIUM: CPT

## 2018-10-21 PROCEDURE — 25000003 PHARM REV CODE 250: Performed by: TRANSPLANT SURGERY

## 2018-10-21 PROCEDURE — 25000003 PHARM REV CODE 250: Performed by: NURSE PRACTITIONER

## 2018-10-21 PROCEDURE — 99232 SBSQ HOSP IP/OBS MODERATE 35: CPT | Mod: ,,, | Performed by: NURSE PRACTITIONER

## 2018-10-21 PROCEDURE — 80069 RENAL FUNCTION PANEL: CPT

## 2018-10-21 PROCEDURE — 85610 PROTHROMBIN TIME: CPT

## 2018-10-21 PROCEDURE — 99900035 HC TECH TIME PER 15 MIN (STAT)

## 2018-10-21 PROCEDURE — 25000003 PHARM REV CODE 250: Performed by: PHYSICIAN ASSISTANT

## 2018-10-21 PROCEDURE — 94664 DEMO&/EVAL PT USE INHALER: CPT

## 2018-10-21 PROCEDURE — C9113 INJ PANTOPRAZOLE SODIUM, VIA: HCPCS | Performed by: PHYSICIAN ASSISTANT

## 2018-10-21 PROCEDURE — 80197 ASSAY OF TACROLIMUS: CPT

## 2018-10-21 PROCEDURE — 63600175 PHARM REV CODE 636 W HCPCS: Performed by: PHYSICIAN ASSISTANT

## 2018-10-21 PROCEDURE — 20600001 HC STEP DOWN PRIVATE ROOM

## 2018-10-21 PROCEDURE — 63600175 PHARM REV CODE 636 W HCPCS: Performed by: STUDENT IN AN ORGANIZED HEALTH CARE EDUCATION/TRAINING PROGRAM

## 2018-10-21 PROCEDURE — 85025 COMPLETE CBC W/AUTO DIFF WBC: CPT | Mod: 91

## 2018-10-21 PROCEDURE — 85730 THROMBOPLASTIN TIME PARTIAL: CPT

## 2018-10-21 PROCEDURE — 25000003 PHARM REV CODE 250: Performed by: STUDENT IN AN ORGANIZED HEALTH CARE EDUCATION/TRAINING PROGRAM

## 2018-10-21 PROCEDURE — 99233 SBSQ HOSP IP/OBS HIGH 50: CPT | Mod: 24,,, | Performed by: PHYSICIAN ASSISTANT

## 2018-10-21 PROCEDURE — 80053 COMPREHEN METABOLIC PANEL: CPT

## 2018-10-21 RX ORDER — ONDANSETRON 2 MG/ML
4 INJECTION INTRAMUSCULAR; INTRAVENOUS EVERY 8 HOURS PRN
Status: DISCONTINUED | OUTPATIENT
Start: 2018-10-21 | End: 2018-12-21 | Stop reason: HOSPADM

## 2018-10-21 RX ORDER — INSULIN ASPART 100 [IU]/ML
14 INJECTION, SOLUTION INTRAVENOUS; SUBCUTANEOUS
Status: DISCONTINUED | OUTPATIENT
Start: 2018-10-21 | End: 2018-10-23

## 2018-10-21 RX ORDER — ALBUMIN HUMAN 50 G/1000ML
25 SOLUTION INTRAVENOUS ONCE
Status: COMPLETED | OUTPATIENT
Start: 2018-10-22 | End: 2018-10-21

## 2018-10-21 RX ORDER — POTASSIUM CHLORIDE 20 MEQ/1
20 TABLET, EXTENDED RELEASE ORAL EVERY 4 HOURS
Status: DISCONTINUED | OUTPATIENT
Start: 2018-10-21 | End: 2018-10-21

## 2018-10-21 RX ADMIN — URSODIOL 300 MG: 300 CAPSULE ORAL at 09:10

## 2018-10-21 RX ADMIN — OXYCODONE HYDROCHLORIDE 5 MG: 5 TABLET ORAL at 10:10

## 2018-10-21 RX ADMIN — PANTOPRAZOLE SODIUM 40 MG: 40 INJECTION, POWDER, FOR SOLUTION INTRAVENOUS at 09:10

## 2018-10-21 RX ADMIN — AMOXICILLIN 500 MG: 500 CAPSULE ORAL at 09:10

## 2018-10-21 RX ADMIN — ALBUMIN HUMAN 25 G: 0.05 INJECTION, SOLUTION INTRAVENOUS at 11:10

## 2018-10-21 RX ADMIN — OXYCODONE HYDROCHLORIDE 5 MG: 5 TABLET ORAL at 01:10

## 2018-10-21 RX ADMIN — SODIUM BICARBONATE 650 MG TABLET 1300 MG: at 09:10

## 2018-10-21 RX ADMIN — MYCOPHENOLATE MOFETIL 1000 MG: 250 CAPSULE ORAL at 09:10

## 2018-10-21 RX ADMIN — MAGNESIUM OXIDE TAB 400 MG (241.3 MG ELEMENTAL MG) 400 MG: 400 (241.3 MG) TAB at 09:10

## 2018-10-21 RX ADMIN — MIRTAZAPINE 7.5 MG: 7.5 TABLET ORAL at 09:10

## 2018-10-21 RX ADMIN — MIDODRINE HYDROCHLORIDE 15 MG: 5 TABLET ORAL at 09:10

## 2018-10-21 RX ADMIN — INSULIN ASPART 14 UNITS: 100 INJECTION, SOLUTION INTRAVENOUS; SUBCUTANEOUS at 09:10

## 2018-10-21 RX ADMIN — FUROSEMIDE 80 MG: 10 INJECTION, SOLUTION INTRAMUSCULAR; INTRAVENOUS at 09:10

## 2018-10-21 RX ADMIN — DOCUSATE SODIUM 100 MG: 100 CAPSULE, LIQUID FILLED ORAL at 01:10

## 2018-10-21 RX ADMIN — INSULIN ASPART 2 UNITS: 100 INJECTION, SOLUTION INTRAVENOUS; SUBCUTANEOUS at 01:10

## 2018-10-21 RX ADMIN — TACROLIMUS 1 MG: 1 CAPSULE ORAL at 05:10

## 2018-10-21 RX ADMIN — FLUCONAZOLE 200 MG: 200 TABLET ORAL at 09:10

## 2018-10-21 RX ADMIN — INSULIN ASPART 14 UNITS: 100 INJECTION, SOLUTION INTRAVENOUS; SUBCUTANEOUS at 06:10

## 2018-10-21 RX ADMIN — INSULIN DETEMIR 4 UNITS: 100 INJECTION, SOLUTION SUBCUTANEOUS at 09:10

## 2018-10-21 RX ADMIN — POTASSIUM BICARBONATE 50 MEQ: 25 TABLET, EFFERVESCENT ORAL at 10:10

## 2018-10-21 RX ADMIN — FUROSEMIDE 80 MG: 10 INJECTION, SOLUTION INTRAMUSCULAR; INTRAVENOUS at 05:10

## 2018-10-21 RX ADMIN — INSULIN ASPART 2 UNITS: 100 INJECTION, SOLUTION INTRAVENOUS; SUBCUTANEOUS at 06:10

## 2018-10-21 RX ADMIN — MIDODRINE HYDROCHLORIDE 15 MG: 5 TABLET ORAL at 01:10

## 2018-10-21 RX ADMIN — ONDANSETRON 4 MG: 2 INJECTION INTRAMUSCULAR; INTRAVENOUS at 09:10

## 2018-10-21 RX ADMIN — ENTECAVIR 0.5 MG: 0.5 TABLET ORAL at 09:10

## 2018-10-21 RX ADMIN — INSULIN DETEMIR 5 UNITS: 100 INJECTION, SOLUTION SUBCUTANEOUS at 09:10

## 2018-10-21 RX ADMIN — LEVOFLOXACIN 250 MG: 250 TABLET, FILM COATED ORAL at 09:10

## 2018-10-21 RX ADMIN — PREDNISONE 15 MG: 5 TABLET ORAL at 09:10

## 2018-10-21 RX ADMIN — TACROLIMUS 1 MG: 1 CAPSULE ORAL at 08:10

## 2018-10-21 RX ADMIN — INSULIN ASPART 14 UNITS: 100 INJECTION, SOLUTION INTRAVENOUS; SUBCUTANEOUS at 01:10

## 2018-10-21 NOTE — PT/OT/SLP PROGRESS
Physical Therapy      Patient Name:  Scarlett Reddy   MRN:  [id number]    Patient not seen today secondary to (Pt declined 2* fatigue from dialysis overnight.). Will follow-up .    Jani Trevino, PTA  10/21/2018

## 2018-10-21 NOTE — PROGRESS NOTES
CRRT NOTES    Blood returned, cant continue treatment due to poor cath flow. Advised primary nurse to dwell with TPA both lumens, will start treatment atleast 1hr post TPA.    Planned to start at 11pm so as to cover the 8hrs during the PM shift.    Plan of care discussed with Primary nurse.

## 2018-10-21 NOTE — ASSESSMENT & PLAN NOTE
- Diuresed w/ Lasix 100mg x 1 (10/12).  - CRRT 10/14.  .    -SLED 10/17, 10/20  -Diuresing w/ Lasix 80 BID and albumin as needed

## 2018-10-21 NOTE — PROGRESS NOTES
CRRT NOTES  2300H - patient hooked on CRRT SLED x 8hrs, 1h 30mins post TPA dwell. Both lumens with good flow.    UFR - 350cc/hr    Received a call from nephro to change Na bath to 136. Orders changed and implemented. See flow sheet

## 2018-10-21 NOTE — ASSESSMENT & PLAN NOTE
Managed per primary team  Avoid hypoglycemia    Recent Labs   Lab 10/21/18  0600   ALT 33   AST 26   ALKPHOS 132   BILITOT 3.8*   PROT 4.7*   ALBUMIN 3.0*  3.0*  3.0*

## 2018-10-21 NOTE — PROGRESS NOTES
CRRT    8-hour SLED completed, patient tolerated well, blood returned, right IJ trialysis flushed and locked with Normal Saline.  Blood volume processed: 74.5

## 2018-10-21 NOTE — ASSESSMENT & PLAN NOTE
- Creatinine on arrival 0.8 and trended up  -BUN/cr (10/11/18): 91/1.9   -Tacrolimus 26.9 (10/08)  Nephrology consulted  Renal US (10/11): no hydronephrosis  Echo 10/12: diastolic dysfunction, likely 2/2 to ARF  - pt on renal diet w 1500 ml FR.  -CRRT 10/14; SLED 10/17, 10/20

## 2018-10-21 NOTE — PLAN OF CARE
Problem: Patient Care Overview  Goal: Plan of Care Review  - Patient is AAOx4, ambulates with 1 person assistance and walker. Patient educated to use call bell for assistance, verbalized understanding. Patient's  at bedside, attentive to patient's needs.   - Telemetry monitoring SR HR 80's - 90's, patient tachycardic with ambulation this AM up to 140's   - Afebrile, WBC 8.97  - Continuing CBC q 6 - 1800 H/H stable 9.5/29.3  - Chevron incision JULIANE with staples - painted with betadine per    - RLQ lateral VALORIE site leaking a large amount of serous fluid - ostomy bag placed with 290 cc of measured output   - CR 1.0 - continuing 80 mg IV lasix BID - urine output 350 cc so far this shift.   - Accuchecks AC/HS/0200 - mealtime and SSI administered per order - appetite OK.   - Patient complained of abdominal pain once so far this shift - PRN oxycodone administered with moderate relief noted.   - See flow sheet for complete assessment details

## 2018-10-21 NOTE — PROGRESS NOTES
Notified AGA PERKINS of the following:   Patient refusing potassium pill, requesting disintegrating tablets. PA to place orders. Will continue to monitor.

## 2018-10-21 NOTE — PROGRESS NOTES
Notified Dr. Driver of the following:  Patient with large amount of serous fluid draining from RLQ VALORIE site. Patient filled multiple pads over the past few hours. VALORIE site was not draining at all yesterday. MD to assess.

## 2018-10-21 NOTE — PROGRESS NOTES
"[facility]  Endocrinology  Progress Note    Admit Date: 10/1/2018     Reason for Consult: Management of type 2 DM, Hyperglycemia      Surgical Procedure and Date: Liver transplant 10/5/18, Ex lap 10/6/18     Diabetes diagnosis year:      Home Diabetes Medications:  Lantus 18 units HS and novolog 17 units with meals plus correction scale (150-200 +1)        Lab Results   Component Value Date     HGBA1C 6.8 (H) 2018        How often checking glucose at home? 3-4   BG readings on regimen: 120-150.  Post prandial readings as high as upper 200s  Hypoglycemia on the regimen? yes, none recently   Missed doses on regimen?  No     Diabetes Complications include:     Diabetic peripheral neuropathy      Complicating diabetes co morbidities:   CIRRHOSIS        HPI:  Patient is a 68 y.o. female with a diagnosis of ESLD, listed for liver transplant with meld 23 who underwent live transplant on 10/5/18.  Back to OR on 10/6/18 for ex lap.  Admitted 10/1/18 for hyponatremia s/p paracentesis. She speaks primarily Y[place]ut. Information obtained from recent past admission and discussion over the phone via  (MDLIVE). Family not at bedside. Pt request family at bedside for future conversations. Personal has known diagnosis of diabetes, endocrine consulted for diabetes management.        Interval HPI:   Overnight events: Remains in TSU, BG elevated yesterday requiring correction scale insulin with each meal time check, within goal this am.  SLED (8 hr) overnight - nephrology following.  Prednisone 15 mg daily, standard steroid taper.  Eatin% - mix of hospital and food from outside.  Denies snacking but eats her meals for long periods of time.  May affect BG readings.  Nausea: No  Hypoglycemia and intervention: No  Fever: No  TPN and/or TF: No    /61 (BP Location: Right arm, Patient Position: Lying)   Pulse 93   Temp 98 °F (36.7 °C) (Oral)   Resp 19   Ht 5' 3" (1.6 m)   Wt 71.3 kg " (157 lb 1.6 oz)   LMP  (LMP Unknown)   SpO2 95%   Breastfeeding? No   BMI 27.83 kg/m²      Labs Reviewed and Include    Recent Labs   Lab 10/21/18  0600   *  151*  151*   CALCIUM 8.4*  8.4*  8.4*   ALBUMIN 3.0*  3.0*  3.0*   PROT 4.7*   *  134*  134*   K 3.0*  3.0*  3.0*   CO2 24  24  24   CL 98  98  98   BUN 40*  40*  40*   CREATININE 1.0  1.0  1.0   ALKPHOS 132   ALT 33   AST 26   BILITOT 3.8*     Lab Results   Component Value Date    WBC 10.07 10/21/2018    WBC 10.07 10/21/2018    HGB 10.5 (L) 10/21/2018    HGB 10.5 (L) 10/21/2018    HCT 31.5 (L) 10/21/2018    HCT 31.5 (L) 10/21/2018    MCV 88 10/21/2018    MCV 88 10/21/2018    PLT 51 (L) 10/21/2018    PLT 51 (L) 10/21/2018     No results for input(s): TSH, FREET4 in the last 168 hours.  Lab Results   Component Value Date    HGBA1C 6.0 (H) 10/04/2018       Nutritional status:   Body mass index is 27.83 kg/m².  Lab Results   Component Value Date    ALBUMIN 3.0 (L) 10/21/2018    ALBUMIN 3.0 (L) 10/21/2018    ALBUMIN 3.0 (L) 10/21/2018     Lab Results   Component Value Date    PREALBUMIN 12 (L) 09/17/2018       Estimated Creatinine Clearance: 50.3 mL/min (based on SCr of 1 mg/dL).    Accu-Checks  Recent Labs     10/19/18  0549 10/19/18  0802 10/19/18  1307 10/19/18  1629 10/19/18  2014 10/20/18  0020 10/20/18  0827 10/20/18  1502 10/20/18  2001 10/21/18  0200   POCTGLUCOSE 139* 129* 242* 278* 256* 259* 211* 224* 273* 195*       Current Medications and/or Treatments Impacting Glycemic Control  Immunotherapy:    Immunosuppressants         Stop Route Frequency     tacrolimus capsule 1 mg      -- Oral 2 times daily     mycophenolate capsule 1,000 mg      -- Oral 2 times daily        Steroids:   Hormones (From admission, onward)    Start     Stop Route Frequency Ordered    11/07/18 0900  predniSONE tablet 2.5 mg      11/14 0859 Oral Daily 10/17/18 0943    10/31/18 0900  predniSONE tablet 5 mg      11/07 0859 Oral Daily 10/17/18 0943     10/24/18 0900  predniSONE tablet 10 mg      10/31 0859 Oral Daily 10/17/18 0943    10/17/18 0945  predniSONE tablet 15 mg      10/24 0859 Oral Daily 10/17/18 0943        Pressors:    Autonomic Drugs (From admission, onward)    Start     Stop Route Frequency Ordered    10/10/18 1500  midodrine tablet 15 mg      -- Oral 3 times daily 10/10/18 1122    10/06/18 1617  rocuronium (ZEMURON) 10 mg/mL injection     Comments:  Created by cabinet override    10/07 0429   10/06/18 1617        Hyperglycemia/Diabetes Medications:   Antihyperglycemics (From admission, onward)    Start     Stop Route Frequency Ordered    10/20/18 2100  insulin detemir U-100 pen 4 Units      -- SubQ Nightly 10/20/18 0735    10/20/18 0900  insulin detemir U-100 pen 5 Units      -- SubQ Daily 10/20/18 0735    10/19/18 1130  insulin aspart U-100 pen 12 Units      -- SubQ 3 times daily with meals 10/19/18 0844    10/14/18 0925  insulin aspart U-100 pen 0-5 Units      -- SubQ Before meals & nightly PRN 10/14/18 0825    10/07/18 1200  insulin regular (Humulin R) 100 Units in sodium chloride 0.9% 100 mL infusion      10/13 2100 IV Continuous 10/07/18 1055          ASSESSMENT and PLAN    * Liver transplanted    Managed per primary team  Avoid hypoglycemia    Recent Labs   Lab 10/21/18  0600   ALT 33   AST 26   ALKPHOS 132   BILITOT 3.8*   PROT 4.7*   ALBUMIN 3.0*  3.0*  3.0*            Type 2 diabetes mellitus with diabetic polyneuropathy, with long-term current use of insulin    BG goal 140 - 180     Levemir 5 units in AM and 4 units in PM  Increase Novolog to 14 units with meals.   Low dose correction scale insulin  BG monitoring /HS/0200      Discharge planning:  TBD     Severe malnutrition    Oral intake encouraged, may affect BG readings     Corticosteroids adverse reaction    On standard steroid taper per transplant team; may elevate BG readings         Prophylactic immunotherapy    May increase insulin resistance.          POC update at bedside  to patient and spouse via  (phone - [place])    Be Willams NP  Endocrinology  [facility]

## 2018-10-21 NOTE — PROGRESS NOTES
[facility]  Liver Transplant  Progress Note    Patient Name: Scarlett Reddy  MRN: [id number]  Admission Date: 10/1/2018  Hospital Length of Stay: 19 days  Code Status: Full Code  Primary Care Provider: Primary Doctor No  Post-Operative Day: 16    ORGAN:   LIVER  Disease Etiology: Cirrhosis: Type B and D  Donor Type:    - Brain Death  CDC High Risk:   No  Donor CMV Status:   Donor CMV Status: Positive  Donor HBcAB:   Negative  Donor HCV Status:   Negative  Donor HBV SETH: Negative  Donor HCV SETH: Negative  Whole or Partial: Whole Liver  Biliary Anastomosis: End to End  Arterial Anatomy: Replaced Left Hepatic and Right Hepatic  Subjective:     History of Present Illness:  Ms. Reddy is a 69 y/o female with PMH of ESLD secondary Hep B and D.  Listed for liver transplant with MELD 23.  Paracentesis 10/1 with 5L removed, no fluid sent for analysis.  Morning labs significant for hyponatremia, Na 119.  Pt admitted to LTS for sodium monitoring.        Hospital Course:  Hospital Course: 69 y/o F h/o HBV/delta cirrhosis s/p DBDLT 10/5/18 (CMV D+/R+, steroid induction, MMF/tacro/pred maintenance) with take back on 10/6 2/2 hyperbilirubinema and bilious VALORIE drainage, no leak identified. Post-op course c/b hypercapneic and hypoxic respiratory failure and was reintubated though quickly extubated now doing well. Liver bx (10/6) sig for Zone 3 hemorrhage and collapse, in addition to cholestasis. Transferred from ICU on POD #4. ID consulted to comment on positive diphtheroid culture from ascitic fluid (likely skin colonization) as well as ESBL Klebsiella pneumoniae in urine culture (10/4).  Pt asymptomatic and cell count from ascitic fluid unremarkable. Per ID recs, no need to treat diphtheroids or asymptomatic ESBL Kleb pneumo bacteriuria though pt has had 6 days of therapy which would cover these organisms. Mild peritransplant ascites- repeat US 10/6 with increased arterial resistive indices. Repeat US 10/9  w/ continued elevation in RIs and fluid collections. LFTs trending down nicely.     Acute gradual worsening of renal function s/p OLT.Creatinine on arrival 0.8 and has been up trending. Nephrology consulted for post-op EVA. Remains on renal/low K diet. Patient continued to be diuresed with lasix. BUN and Cr remain elevated requiring multiple rounds of dialysis (10/15, 10/17, 10/20). Additionally, pt w/ anemia requiring multiple blood transfusions (10/14, 10/16, 10/18, 10/19). Will cont close monitoring H/H, transfuse as needed with goal Hb > 7.0. Decreased platelets, improvements w/ 2 u plts (10/18, 10/19) and DDAVP.   Pt reported dark stools 10/15 but color has normalized. No blood noted on MORELIA. FOBT+. EGD 10/18 consistent with ulcerated mucosa in duodenum s/p coagulation. Biopsy pending. Concern for H. Pylori. Started Amoxicillin/levofloxacin (10/19). Remains on Protonix 40 mg BID.   Of note, Urine cx + Klebsiella pneumoniae (10/13). Received 3 day course of ciprofloxacin, dc'd 10/19.   On 10/19 pm pt c/o crushing chest pain- cardiac workup initiated. Troponin 0.094, likely 2/2 ischemic stress. EKG NSR.     Interval history: Pt underwent dialysis yesterday. Pt feeling very nauseous, weak and lethargic today. Cont Lasix 80 BID. VSS. +BM. Will follow-up labs in the morning. Pt eating well. Encouraged pt to cont increasing nutritional intake.     Scheduled Meds:   amoxicillin  500 mg Oral Q12H    entecavir  0.5 mg Oral Q72H    ergocalciferol  50,000 Units Oral Q7 Days    fluconazole  200 mg Oral Daily    furosemide  80 mg Intravenous BID    insulin aspart U-100  14 Units Subcutaneous TIDWM    insulin detemir U-100  4 Units Subcutaneous QHS    insulin detemir U-100  5 Units Subcutaneous Daily    levoFLOXacin  250 mg Oral Daily    magnesium oxide  400 mg Oral BID    midodrine  15 mg Oral TID    mirtazapine  7.5 mg Oral QHS    mycophenolate  1,000 mg Oral BID    pantoprazole  40 mg Intravenous BID     potassium bicarbonate  50 mEq Oral Daily    predniSONE  15 mg Oral Daily    Followed by    [START ON 10/24/2018] predniSONE  10 mg Oral Daily    Followed by    [START ON 10/31/2018] predniSONE  5 mg Oral Daily    Followed by    [START ON 11/7/2018] predniSONE  2.5 mg Oral Daily    sodium bicarbonate  1,300 mg Oral BID    sulfamethoxazole-trimethoprim 400-80mg  1 tablet Oral Every Mon, Wed, Fri    tacrolimus  1 mg Oral BID    ursodiol  300 mg Oral BID    valGANciclovir  450 mg Oral Twice Weekly     Continuous Infusions:  PRN Meds:sodium chloride, bisacodyl, bisacodyl, calcium carbonate, dextrose 50%, dextrose 50%, docusate sodium, glucagon (human recombinant), glucose, glucose, insulin aspart U-100, ondansetron, ondansetron, oxyCODONE, oxyCODONE, polyethylene glycol, promethazine (PHENERGAN) IVPB, sodium chloride 0.9%    Review of Systems   Constitutional: Positive for activity change and fatigue. Negative for chills and fever.   Respiratory: Negative for shortness of breath and wheezing.    Cardiovascular: Positive for leg swelling. Negative for chest pain.   Gastrointestinal: Positive for abdominal distention, abdominal pain and nausea. Negative for constipation, diarrhea and vomiting.   Genitourinary: Negative for decreased urine volume, difficulty urinating and dysuria.   Musculoskeletal: Negative for arthralgias.   Allergic/Immunologic: Positive for immunocompromised state.   Neurological: Positive for weakness.   Psychiatric/Behavioral: Negative for confusion.     Objective:     Vital Signs (Most Recent):  Temp: 97.6 °F (36.4 °C) (10/21/18 0810)  Pulse: 96 (10/21/18 1120)  Resp: 12 (10/21/18 1115)  BP: 114/73 (10/21/18 1115)  SpO2: 100 % (10/21/18 1115) Vital Signs (24h Range):  Temp:  [97.6 °F (36.4 °C)-98 °F (36.7 °C)] 97.6 °F (36.4 °C)  Pulse:  [] 96  Resp:  [10-19] 12  SpO2:  [95 %-100 %] 100 %  BP: (102-131)/(53-75) 114/73     Weight: 71.3 kg (157 lb 1.6 oz)  Body mass index is 27.83  kg/m².    Intake/Output - Last 3 Shifts       10/19 0700 - 10/20 0659 10/20 0700 - 10/21 0659 10/21 0700 - 10/22 0659    P.O. 500 870     I.V. (mL/kg)       Other       IV Piggyback       Total Intake(mL/kg) 500 (7) 870 (12.2)     Urine (mL/kg/hr) 1000 (0.6) 1800 (1.1) 50 (0.1)    Emesis/NG output  0     Other  2371     Stool 0 0 0    Blood  0     Total Output 1000 4171 50    Net -500 -3301 -50           Urine Occurrence 3 x 0 x     Stool Occurrence 5 x 1 x 0 x    Emesis Occurrence  0 x           Physical Exam   Constitutional: She is oriented to person, place, and time.   Temporal and distal extremity muscle wasting   Eyes: EOM are normal. Pupils are equal, round, and reactive to light. Scleral icterus is present.   Neck: Normal range of motion.   Cardiovascular: Normal rate and regular rhythm.   No murmur heard.  Pulmonary/Chest: Effort normal and breath sounds normal. No respiratory distress. She has no wheezes.   Abdominal: Soft. Bowel sounds are normal. She exhibits distension. There is tenderness. There is no guarding.   Chevron incision w/ staples, CDI  Pt swollen across abdomen.  No signs of infection   Musculoskeletal: Normal range of motion. She exhibits edema (3+ BLE).   Neurological: She is alert and oriented to person, place, and time.   Skin: Skin is warm and dry.   Nursing note and vitals reviewed.      Laboratory:  Immunosuppressants         Stop Route Frequency     tacrolimus capsule 1 mg      -- Oral 2 times daily     mycophenolate capsule 1,000 mg      -- Oral 2 times daily        CBC:   Recent Labs   Lab 10/21/18  0600   WBC 10.07  10.07   RBC 3.58*  3.58*   HGB 10.5*  10.5*   HCT 31.5*  31.5*   PLT 51*  51*   MCV 88  88   MCH 29.3  29.3   MCHC 33.3  33.3     CMP:   Recent Labs   Lab 10/21/18  0600   *  151*  151*   CALCIUM 8.4*  8.4*  8.4*   ALBUMIN 3.0*  3.0*  3.0*   PROT 4.7*   *  134*  134*   K 3.0*  3.0*  3.0*   CO2 24  24  24   CL 98  98  98   BUN 40*   40*  40*   CREATININE 1.0  1.0  1.0   ALKPHOS 132   ALT 33   AST 26   BILITOT 3.8*     Labs within the past 24 hours have been reviewed.    Diagnostic Results:  I have personally reviewed all pertinent imaging studies.    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   -POD 1 US: increased arterial resistive indices, suggestive of edema vs cute rejection vs congestion  -Repeat US 10/9 with continued elevation of RIs  -LFTs trending down  -T bili trending down  -repeat liver US (10/11): elevated RIs  - LFTs normalizing            Duodenal ulcer    EGD 10/18- sig for ulcerated duodenal mucosa.   Biopsy pending  Cont protonix 40 BID  Started H. Pylori tx 10/19: Amoxicillin/Levo. Remains on PPI       UTI (urinary tract infection)    Urine Cx 10/15 w/ Klebsiella pneumonia  Started Ciprofloxacin 10/17. Dc'd 10/19. Coverage w/ h. pylori treatment  Pt asymptomatic at this time       Acute blood loss anemia    H/H cont to fluctuate.   FOBT +  EGD 10/18- showed diffuse bleeding with ulcerated duodenal mucosa   Monitoring w/ CBC q 6 to assess the need for transfusion            Post LT - Acute renal failure    - Creatinine on arrival 0.8 and trended up  -BUN/cr (10/11/18): 91/1.9   -Tacrolimus 26.9 (10/08)  Nephrology consulted  Renal US (10/11): no hydronephrosis  Echo 10/12: diastolic dysfunction, likely 2/2 to ARF  - pt on renal diet w 1500 ml FR.  -CRRT 10/14; SLED 10/17, 10/20     Hypervolemia associated with renal insufficiency    - Diuresed w/ Lasix 100mg x 1 (10/12).  - CRRT 10/14.  .    -SLED 10/17, 10/20  -Diuresing w/ Lasix 80 BID and albumin as needed       EVA (acute kidney injury)           Metabolic acidosis    - Started Na bicarb 1300 QID.  -CRRT 10/14  -SLED 10/17, 10/20         At risk for opportunistic infections    - Valcyte for  CMV prophylaxis  - Bactrim for PCP prophylaxis (MWF)  - Fluconazole for fungal prophylaxis        Long-term use of immunosuppressant medication    -  Cellcept/Prograf/pred  -  Held prograf 10/8 for elevated levels  -  Resumed Prograf 10/10/19. Will adjust as needed.   -  Will monitor for signs of toxic side effects, check daily tacrolimus troughs, and change meds accordingly.       Prophylactic immunotherapy    - See long term use immunosuppresion       Ascites    - paracentesis 10/1, 5L removed, no fluid sent for analysis.  - diuresing with albumin and lasix 10/9.  - Per Nephrology, on 10/13- Lasix 80 mg bid and monitor.  No need for RRT.  -  10/14, pt w worsening edema.  , .  Nephr plan for CRRT this evening.        Weakness    - see physical decond.       Physical deconditioning    - PT/OT consulted.  - Recommend home health PT and rolling walker at discharge        Hyponatremia    - Na 119 on admit.  -Na was improving post transplant w HD.  - Pt POD #9 w EVA  - Na stable at this time. Monitoring w/ daily labs            Hypotension    - continue Midodrine 15 mg TID.   Monitor.       Anemia requiring transfusions    - Fluctuating H/H.    -Transfuse 10/14 and 10/16 (1 unit); 10/18 (2 units); 10/19 (1 unit)  -Decreased platelets -- 1 u plt given 10/18   -DDAVP given 10/18 and 10/19  -FOBT +  -LDH elevated, Hapto < 10  -EGD 10/18: ulcerated duodenal mucosa   Monitoring w/ CBC q6hr to assess the need for transfusion       Severe malnutrition    - dietary consulted.   - temporal and distal extremity muscle wasting and edema  - encourage supplements and to increase nutritional intake   - pt on renal/low K+ diet     Type 2 diabetes mellitus with diabetic polyneuropathy, with long-term current use of insulin    - continue home regimen.  - endocrine consulted.  -Pt off insulin drip at this time.  Apprec endo recs.        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  -Tx w/  Entecavir- dose changed to q72 hours given renal function.             VTE Risk Mitigation (From admission, onward)        Ordered     Place sequential compression device  Until discontinued      10/05/18 0709          The patients clinical status was discussed at multidisplinary rounds, involving transplant surgery, transplant medicine, pharmacy, nursing, nutrition, and social work    Discharge Planning:  No Patient Care Coordination Note on file.      Jihan Soares PA-C  Liver Transplant  [facility]

## 2018-10-21 NOTE — PROGRESS NOTES
Notified AGA Tineo NP of the following:  Patient's HR elevated to 140's when ambulating to the bathroom, resting HR 90's when sitting - BP stable 110's/70's.   NP stated OK to give IV lasix. Will continue to monitor.

## 2018-10-21 NOTE — PROGRESS NOTES
Patient currently stable. No need for dialysis today. Will reassess tomorrow if any treatment required.       Rafal Suárez  Nephrology  Fellow  [facility]    Pager [phone]

## 2018-10-21 NOTE — SUBJECTIVE & OBJECTIVE
Scheduled Meds:   amoxicillin  500 mg Oral Q12H    entecavir  0.5 mg Oral Q72H    ergocalciferol  50,000 Units Oral Q7 Days    fluconazole  200 mg Oral Daily    furosemide  80 mg Intravenous BID    insulin aspart U-100  14 Units Subcutaneous TIDWM    insulin detemir U-100  4 Units Subcutaneous QHS    insulin detemir U-100  5 Units Subcutaneous Daily    levoFLOXacin  250 mg Oral Daily    magnesium oxide  400 mg Oral BID    midodrine  15 mg Oral TID    mirtazapine  7.5 mg Oral QHS    mycophenolate  1,000 mg Oral BID    pantoprazole  40 mg Intravenous BID    potassium bicarbonate  50 mEq Oral Daily    predniSONE  15 mg Oral Daily    Followed by    [START ON 10/24/2018] predniSONE  10 mg Oral Daily    Followed by    [START ON 10/31/2018] predniSONE  5 mg Oral Daily    Followed by    [START ON 11/7/2018] predniSONE  2.5 mg Oral Daily    sodium bicarbonate  1,300 mg Oral BID    sulfamethoxazole-trimethoprim 400-80mg  1 tablet Oral Every Mon, Wed, Fri    tacrolimus  1 mg Oral BID    ursodiol  300 mg Oral BID    valGANciclovir  450 mg Oral Twice Weekly     Continuous Infusions:  PRN Meds:sodium chloride, bisacodyl, bisacodyl, calcium carbonate, dextrose 50%, dextrose 50%, docusate sodium, glucagon (human recombinant), glucose, glucose, insulin aspart U-100, ondansetron, ondansetron, oxyCODONE, oxyCODONE, polyethylene glycol, promethazine (PHENERGAN) IVPB, sodium chloride 0.9%    Review of Systems   Constitutional: Positive for activity change and fatigue. Negative for chills and fever.   Respiratory: Negative for shortness of breath and wheezing.    Cardiovascular: Positive for leg swelling. Negative for chest pain.   Gastrointestinal: Positive for abdominal distention, abdominal pain and nausea. Negative for constipation, diarrhea and vomiting.   Genitourinary: Negative for decreased urine volume, difficulty urinating and dysuria.   Musculoskeletal: Negative for arthralgias.   Allergic/Immunologic:  Positive for immunocompromised state.   Neurological: Positive for weakness.   Psychiatric/Behavioral: Negative for confusion.     Objective:     Vital Signs (Most Recent):  Temp: 97.6 °F (36.4 °C) (10/21/18 0810)  Pulse: 96 (10/21/18 1120)  Resp: 12 (10/21/18 1115)  BP: 114/73 (10/21/18 1115)  SpO2: 100 % (10/21/18 1115) Vital Signs (24h Range):  Temp:  [97.6 °F (36.4 °C)-98 °F (36.7 °C)] 97.6 °F (36.4 °C)  Pulse:  [] 96  Resp:  [10-19] 12  SpO2:  [95 %-100 %] 100 %  BP: (102-131)/(53-75) 114/73     Weight: 71.3 kg (157 lb 1.6 oz)  Body mass index is 27.83 kg/m².    Intake/Output - Last 3 Shifts       10/19 0700 - 10/20 0659 10/20 0700 - 10/21 0659 10/21 0700 - 10/22 0659    P.O. 500 870     I.V. (mL/kg)       Other       IV Piggyback       Total Intake(mL/kg) 500 (7) 870 (12.2)     Urine (mL/kg/hr) 1000 (0.6) 1800 (1.1) 50 (0.1)    Emesis/NG output  0     Other  2371     Stool 0 0 0    Blood  0     Total Output 1000 4171 50    Net -500 -3301 -50           Urine Occurrence 3 x 0 x     Stool Occurrence 5 x 1 x 0 x    Emesis Occurrence  0 x           Physical Exam   Constitutional: She is oriented to person, place, and time.   Temporal and distal extremity muscle wasting   Eyes: EOM are normal. Pupils are equal, round, and reactive to light. Scleral icterus is present.   Neck: Normal range of motion.   Cardiovascular: Normal rate and regular rhythm.   No murmur heard.  Pulmonary/Chest: Effort normal and breath sounds normal. No respiratory distress. She has no wheezes.   Abdominal: Soft. Bowel sounds are normal. She exhibits distension. There is tenderness. There is no guarding.   Chevron incision w/ staples, CDI  Pt swollen across abdomen.  No signs of infection   Musculoskeletal: Normal range of motion. She exhibits edema (3+ BLE).   Neurological: She is alert and oriented to person, place, and time.   Skin: Skin is warm and dry.   Nursing note and vitals reviewed.      Laboratory:  Immunosuppressants          Stop Route Frequency     tacrolimus capsule 1 mg      -- Oral 2 times daily     mycophenolate capsule 1,000 mg      -- Oral 2 times daily        CBC:   Recent Labs   Lab 10/21/18  0600   WBC 10.07  10.07   RBC 3.58*  3.58*   HGB 10.5*  10.5*   HCT 31.5*  31.5*   PLT 51*  51*   MCV 88  88   MCH 29.3  29.3   MCHC 33.3  33.3     CMP:   Recent Labs   Lab 10/21/18  0600   *  151*  151*   CALCIUM 8.4*  8.4*  8.4*   ALBUMIN 3.0*  3.0*  3.0*   PROT 4.7*   *  134*  134*   K 3.0*  3.0*  3.0*   CO2 24  24  24   CL 98  98  98   BUN 40*  40*  40*   CREATININE 1.0  1.0  1.0   ALKPHOS 132   ALT 33   AST 26   BILITOT 3.8*     Labs within the past 24 hours have been reviewed.    Diagnostic Results:  I have personally reviewed all pertinent imaging studies.

## 2018-10-21 NOTE — SUBJECTIVE & OBJECTIVE
"Interval HPI:   Overnight events: Remains in TSU, BG elevated yesterday requiring correction scale insulin with each meal time check, within goal this am.  SLED (8 hr) overnight - nephrology following.  Prednisone 15 mg daily, standard steroid taper.  Eatin% - mix of hospital and food from outside.  Denies snacking but eats her meals for long periods of time.  May affect BG readings.  Nausea: No  Hypoglycemia and intervention: No  Fever: No  TPN and/or TF: No    /61 (BP Location: Right arm, Patient Position: Lying)   Pulse 93   Temp 98 °F (36.7 °C) (Oral)   Resp 19   Ht 5' 3" (1.6 m)   Wt 71.3 kg (157 lb 1.6 oz)   LMP  (LMP Unknown)   SpO2 95%   Breastfeeding? No   BMI 27.83 kg/m²     Labs Reviewed and Include    Recent Labs   Lab 10/21/18  0600   *  151*  151*   CALCIUM 8.4*  8.4*  8.4*   ALBUMIN 3.0*  3.0*  3.0*   PROT 4.7*   *  134*  134*   K 3.0*  3.0*  3.0*   CO2 24  24  24   CL 98  98  98   BUN 40*  40*  40*   CREATININE 1.0  1.0  1.0   ALKPHOS 132   ALT 33   AST 26   BILITOT 3.8*     Lab Results   Component Value Date    WBC 10.07 10/21/2018    WBC 10.07 10/21/2018    HGB 10.5 (L) 10/21/2018    HGB 10.5 (L) 10/21/2018    HCT 31.5 (L) 10/21/2018    HCT 31.5 (L) 10/21/2018    MCV 88 10/21/2018    MCV 88 10/21/2018    PLT 51 (L) 10/21/2018    PLT 51 (L) 10/21/2018     No results for input(s): TSH, FREET4 in the last 168 hours.  Lab Results   Component Value Date    HGBA1C 6.0 (H) 10/04/2018       Nutritional status:   Body mass index is 27.83 kg/m².  Lab Results   Component Value Date    ALBUMIN 3.0 (L) 10/21/2018    ALBUMIN 3.0 (L) 10/21/2018    ALBUMIN 3.0 (L) 10/21/2018     Lab Results   Component Value Date    PREALBUMIN 12 (L) 2018       Estimated Creatinine Clearance: 50.3 mL/min (based on SCr of 1 mg/dL).    Accu-Checks  Recent Labs     10/19/18  0549 10/19/18  0802 10/19/18  1307 10/19/18  1629 10/19/18  2014 10/20/18  0020 10/20/18  0827 " 10/20/18  1502 10/20/18  2001 10/21/18  0200   POCTGLUCOSE 139* 129* 242* 278* 256* 259* 211* 224* 273* 195*       Current Medications and/or Treatments Impacting Glycemic Control  Immunotherapy:    Immunosuppressants         Stop Route Frequency     tacrolimus capsule 1 mg      -- Oral 2 times daily     mycophenolate capsule 1,000 mg      -- Oral 2 times daily        Steroids:   Hormones (From admission, onward)    Start     Stop Route Frequency Ordered    11/07/18 0900  predniSONE tablet 2.5 mg      11/14 0859 Oral Daily 10/17/18 0943    10/31/18 0900  predniSONE tablet 5 mg      11/07 0859 Oral Daily 10/17/18 0943    10/24/18 0900  predniSONE tablet 10 mg      10/31 0859 Oral Daily 10/17/18 0943    10/17/18 0945  predniSONE tablet 15 mg      10/24 0859 Oral Daily 10/17/18 0943        Pressors:    Autonomic Drugs (From admission, onward)    Start     Stop Route Frequency Ordered    10/10/18 1500  midodrine tablet 15 mg      -- Oral 3 times daily 10/10/18 1122    10/06/18 1617  rocuronium (ZEMURON) 10 mg/mL injection     Comments:  Created by cabinet overrholley    10/07 0429   10/06/18 1617        Hyperglycemia/Diabetes Medications:   Antihyperglycemics (From admission, onward)    Start     Stop Route Frequency Ordered    10/20/18 2100  insulin detemir U-100 pen 4 Units      -- SubQ Nightly 10/20/18 0735    10/20/18 0900  insulin detemir U-100 pen 5 Units      -- SubQ Daily 10/20/18 0735    10/19/18 1130  insulin aspart U-100 pen 12 Units      -- SubQ 3 times daily with meals 10/19/18 0844    10/14/18 0925  insulin aspart U-100 pen 0-5 Units      -- SubQ Before meals & nightly PRN 10/14/18 0825    10/07/18 1200  insulin regular (Humulin R) 100 Units in sodium chloride 0.9% 100 mL infusion      10/13 2100 IV Continuous 10/07/18 1055

## 2018-10-21 NOTE — PROGRESS NOTES
CRRT:    8-hour SLED initiated via right IJ trialysis.  No outflow on Arterial port, with good flow on venous port, swapped arterial and venous ports.  UF rate set @350ml/hr

## 2018-10-21 NOTE — PLAN OF CARE
Problem: Patient Care Overview  Goal: Plan of Care Review  Outcome: Ongoing (interventions implemented as appropriate)  Pt is AAOx4 in bed wearing non-skid footwear, bed in low/locked position and with call bell within reach. Pt reminded to use call bell to call for assistance, pt verbalizes understanding. Daughter at bedside. Pt is afebrile at this time. Proper hand hygiene performed before and after pt care activities. Chevron incision JULIANE with staples, free from SSI and painted with betadine. CRRT currently in process for 8hrs, patient to be done at 0700. Patient tolerating CRRT well at goal UF of 350cc/hr. Urine output of 550cc so far this shift. BG monitored AC/HS/2am. BG this shift has been 273 and 195, scheduled Levemir given and sliding scale coverage administered as needed. CBC drawn Q6, last H&H 10/30.3. Patient c/o incisional pain as well as back pain. PRN 5mg Oxycodone IR administered twice so far this shift. Patient repositioned in bed regularly to promote comfort and prevent skin breakdown. Denies any pain or discomfort at this time.

## 2018-10-22 LAB
ABO + RH BLD: NORMAL
ALBUMIN SERPL BCP-MCNC: 2.6 G/DL
ALP SERPL-CCNC: 86 U/L
ALT SERPL W/O P-5'-P-CCNC: 22 U/L
ANION GAP SERPL CALC-SCNC: 11 MMOL/L
ANISOCYTOSIS BLD QL SMEAR: SLIGHT
ANISOCYTOSIS BLD QL SMEAR: SLIGHT
APTT BLDCRRT: 26.6 SEC
AST SERPL-CCNC: 21 U/L
BASO STIPL BLD QL SMEAR: ABNORMAL
BASO STIPL BLD QL SMEAR: ABNORMAL
BASOPHILS # BLD AUTO: 0.01 K/UL
BASOPHILS NFR BLD: 0.1 %
BASOPHILS NFR BLD: 0.1 %
BASOPHILS NFR BLD: 0.2 %
BASOPHILS NFR BLD: 0.2 %
BILIRUB SERPL-MCNC: 3.3 MG/DL
BLD GP AB SCN CELLS X3 SERPL QL: NORMAL
BLD PROD TYP BPU: NORMAL
BLOOD UNIT EXPIRATION DATE: NORMAL
BLOOD UNIT TYPE CODE: 600
BLOOD UNIT TYPE: NORMAL
BUN SERPL-MCNC: 67 MG/DL
CALCIUM SERPL-MCNC: 8.4 MG/DL
CHLORIDE SERPL-SCNC: 97 MMOL/L
CO2 SERPL-SCNC: 24 MMOL/L
CODING SYSTEM: NORMAL
CREAT SERPL-MCNC: 1.5 MG/DL
DIFFERENTIAL METHOD: ABNORMAL
DISPENSE STATUS: NORMAL
EOSINOPHIL # BLD AUTO: 0 K/UL
EOSINOPHIL # BLD AUTO: 0 K/UL
EOSINOPHIL # BLD AUTO: 0.1 K/UL
EOSINOPHIL # BLD AUTO: 0.1 K/UL
EOSINOPHIL NFR BLD: 0.1 %
EOSINOPHIL NFR BLD: 0.5 %
EOSINOPHIL NFR BLD: 1.2 %
EOSINOPHIL NFR BLD: 1.2 %
ERYTHROCYTE [DISTWIDTH] IN BLOOD BY AUTOMATED COUNT: 17.7 %
ERYTHROCYTE [DISTWIDTH] IN BLOOD BY AUTOMATED COUNT: 18.3 %
ERYTHROCYTE [DISTWIDTH] IN BLOOD BY AUTOMATED COUNT: 18.4 %
ERYTHROCYTE [DISTWIDTH] IN BLOOD BY AUTOMATED COUNT: 18.4 %
EST. GFR  (AFRICAN AMERICAN): 40.7 ML/MIN/1.73 M^2
EST. GFR  (NON AFRICAN AMERICAN): 35.3 ML/MIN/1.73 M^2
GLUCOSE SERPL-MCNC: 115 MG/DL
HCT VFR BLD AUTO: 22.1 %
HCT VFR BLD AUTO: 22.1 %
HCT VFR BLD AUTO: 23.6 %
HCT VFR BLD AUTO: 29.7 %
HGB BLD-MCNC: 10.1 G/DL
HGB BLD-MCNC: 7.2 G/DL
HGB BLD-MCNC: 7.2 G/DL
HGB BLD-MCNC: 7.7 G/DL
IMM GRANULOCYTES # BLD AUTO: 0.03 K/UL
IMM GRANULOCYTES # BLD AUTO: 0.03 K/UL
IMM GRANULOCYTES # BLD AUTO: 0.05 K/UL
IMM GRANULOCYTES # BLD AUTO: 0.05 K/UL
IMM GRANULOCYTES NFR BLD AUTO: 0.6 %
IMM GRANULOCYTES NFR BLD AUTO: 0.7 %
INR PPP: 1.2
LYMPHOCYTES # BLD AUTO: 0.1 K/UL
LYMPHOCYTES # BLD AUTO: 0.3 K/UL
LYMPHOCYTES NFR BLD: 1.6 %
LYMPHOCYTES NFR BLD: 3.5 %
LYMPHOCYTES NFR BLD: 5.8 %
LYMPHOCYTES NFR BLD: 5.8 %
MAGNESIUM SERPL-MCNC: 1.5 MG/DL
MCH RBC QN AUTO: 29.6 PG
MCH RBC QN AUTO: 29.8 PG
MCHC RBC AUTO-ENTMCNC: 32.6 G/DL
MCHC RBC AUTO-ENTMCNC: 34 G/DL
MCV RBC AUTO: 88 FL
MCV RBC AUTO: 91 FL
MONOCYTES # BLD AUTO: 0.1 K/UL
MONOCYTES # BLD AUTO: 0.2 K/UL
MONOCYTES # BLD AUTO: 0.2 K/UL
MONOCYTES # BLD AUTO: 0.4 K/UL
MONOCYTES NFR BLD: 1.6 %
MONOCYTES NFR BLD: 4.7 %
MONOCYTES NFR BLD: 4.7 %
MONOCYTES NFR BLD: 5.5 %
NEUTROPHILS # BLD AUTO: 4.3 K/UL
NEUTROPHILS # BLD AUTO: 4.3 K/UL
NEUTROPHILS # BLD AUTO: 6.9 K/UL
NEUTROPHILS # BLD AUTO: 8.2 K/UL
NEUTROPHILS NFR BLD: 87.5 %
NEUTROPHILS NFR BLD: 87.5 %
NEUTROPHILS NFR BLD: 89.7 %
NEUTROPHILS NFR BLD: 96 %
NRBC BLD-RTO: 0 /100 WBC
OVALOCYTES BLD QL SMEAR: ABNORMAL
OVALOCYTES BLD QL SMEAR: ABNORMAL
PHOSPHATE SERPL-MCNC: 3.1 MG/DL
PLATELET # BLD AUTO: 37 K/UL
PLATELET # BLD AUTO: 37 K/UL
PLATELET # BLD AUTO: 44 K/UL
PLATELET # BLD AUTO: 49 K/UL
PLATELET BLD QL SMEAR: ABNORMAL
PLATELET BLD QL SMEAR: ABNORMAL
PMV BLD AUTO: 11.3 FL
PMV BLD AUTO: 11.3 FL
PMV BLD AUTO: 11.7 FL
PMV BLD AUTO: 12.3 FL
POCT GLUCOSE: 143 MG/DL (ref 70–110)
POCT GLUCOSE: 147 MG/DL (ref 70–110)
POCT GLUCOSE: 163 MG/DL (ref 70–110)
POCT GLUCOSE: 211 MG/DL (ref 70–110)
POCT GLUCOSE: 217 MG/DL (ref 70–110)
POCT GLUCOSE: 228 MG/DL (ref 70–110)
POIKILOCYTOSIS BLD QL SMEAR: SLIGHT
POIKILOCYTOSIS BLD QL SMEAR: SLIGHT
POTASSIUM SERPL-SCNC: 3.9 MMOL/L
PROT SERPL-MCNC: 3.7 G/DL
PROTHROMBIN TIME: 11.9 SEC
RBC # BLD AUTO: 2.43 M/UL
RBC # BLD AUTO: 2.43 M/UL
RBC # BLD AUTO: 2.6 M/UL
RBC # BLD AUTO: 3.39 M/UL
SODIUM SERPL-SCNC: 132 MMOL/L
TACROLIMUS BLD-MCNC: 8.2 NG/ML
TRANS ERYTHROCYTES VOL PATIENT: NORMAL ML
WBC # BLD AUTO: 4.86 K/UL
WBC # BLD AUTO: 4.86 K/UL
WBC # BLD AUTO: 7.66 K/UL
WBC # BLD AUTO: 8.54 K/UL

## 2018-10-22 PROCEDURE — 25000003 PHARM REV CODE 250: Performed by: PHYSICIAN ASSISTANT

## 2018-10-22 PROCEDURE — 94799 UNLISTED PULMONARY SVC/PX: CPT

## 2018-10-22 PROCEDURE — 25000003 PHARM REV CODE 250: Performed by: TRANSPLANT SURGERY

## 2018-10-22 PROCEDURE — 25000003 PHARM REV CODE 250: Performed by: STUDENT IN AN ORGANIZED HEALTH CARE EDUCATION/TRAINING PROGRAM

## 2018-10-22 PROCEDURE — 86901 BLOOD TYPING SEROLOGIC RH(D): CPT

## 2018-10-22 PROCEDURE — 84478 ASSAY OF TRIGLYCERIDES: CPT

## 2018-10-22 PROCEDURE — 63600175 PHARM REV CODE 636 W HCPCS: Performed by: PHYSICIAN ASSISTANT

## 2018-10-22 PROCEDURE — 63600175 PHARM REV CODE 636 W HCPCS: Performed by: STUDENT IN AN ORGANIZED HEALTH CARE EDUCATION/TRAINING PROGRAM

## 2018-10-22 PROCEDURE — 20600001 HC STEP DOWN PRIVATE ROOM

## 2018-10-22 PROCEDURE — 83735 ASSAY OF MAGNESIUM: CPT

## 2018-10-22 PROCEDURE — P9021 RED BLOOD CELLS UNIT: HCPCS

## 2018-10-22 PROCEDURE — 94761 N-INVAS EAR/PLS OXIMETRY MLT: CPT

## 2018-10-22 PROCEDURE — 85025 COMPLETE CBC W/AUTO DIFF WBC: CPT

## 2018-10-22 PROCEDURE — 84100 ASSAY OF PHOSPHORUS: CPT

## 2018-10-22 PROCEDURE — 25000003 PHARM REV CODE 250: Performed by: NURSE PRACTITIONER

## 2018-10-22 PROCEDURE — P9045 ALBUMIN (HUMAN), 5%, 250 ML: HCPCS | Mod: JG | Performed by: STUDENT IN AN ORGANIZED HEALTH CARE EDUCATION/TRAINING PROGRAM

## 2018-10-22 PROCEDURE — 99233 SBSQ HOSP IP/OBS HIGH 50: CPT | Mod: ,,, | Performed by: INTERNAL MEDICINE

## 2018-10-22 PROCEDURE — 63600175 PHARM REV CODE 636 W HCPCS: Mod: JG | Performed by: STUDENT IN AN ORGANIZED HEALTH CARE EDUCATION/TRAINING PROGRAM

## 2018-10-22 PROCEDURE — 80053 COMPREHEN METABOLIC PANEL: CPT

## 2018-10-22 PROCEDURE — 36415 COLL VENOUS BLD VENIPUNCTURE: CPT

## 2018-10-22 PROCEDURE — 85610 PROTHROMBIN TIME: CPT

## 2018-10-22 PROCEDURE — 27000646 HC AEROBIKA DEVICE

## 2018-10-22 PROCEDURE — C9113 INJ PANTOPRAZOLE SODIUM, VIA: HCPCS | Performed by: PHYSICIAN ASSISTANT

## 2018-10-22 PROCEDURE — 99900035 HC TECH TIME PER 15 MIN (STAT)

## 2018-10-22 PROCEDURE — 94664 DEMO&/EVAL PT USE INHALER: CPT

## 2018-10-22 PROCEDURE — 85730 THROMBOPLASTIN TIME PARTIAL: CPT

## 2018-10-22 PROCEDURE — 80197 ASSAY OF TACROLIMUS: CPT

## 2018-10-22 PROCEDURE — 86920 COMPATIBILITY TEST SPIN: CPT

## 2018-10-22 PROCEDURE — 99233 SBSQ HOSP IP/OBS HIGH 50: CPT | Mod: 24,,, | Performed by: PHYSICIAN ASSISTANT

## 2018-10-22 RX ORDER — LIDOCAINE HYDROCHLORIDE 10 MG/ML
10 INJECTION INFILTRATION; PERINEURAL ONCE
Status: COMPLETED | OUTPATIENT
Start: 2018-10-22 | End: 2018-10-22

## 2018-10-22 RX ORDER — TACROLIMUS 0.5 MG/1
0.5 CAPSULE ORAL 2 TIMES DAILY
Status: DISCONTINUED | OUTPATIENT
Start: 2018-10-22 | End: 2018-10-23

## 2018-10-22 RX ORDER — HYDROCODONE BITARTRATE AND ACETAMINOPHEN 500; 5 MG/1; MG/1
TABLET ORAL
Status: DISCONTINUED | OUTPATIENT
Start: 2018-10-22 | End: 2018-10-23

## 2018-10-22 RX ADMIN — INSULIN DETEMIR 4 UNITS: 100 INJECTION, SOLUTION SUBCUTANEOUS at 11:10

## 2018-10-22 RX ADMIN — AMOXICILLIN 500 MG: 500 CAPSULE ORAL at 08:10

## 2018-10-22 RX ADMIN — FUROSEMIDE 80 MG: 10 INJECTION, SOLUTION INTRAMUSCULAR; INTRAVENOUS at 05:10

## 2018-10-22 RX ADMIN — URSODIOL 300 MG: 300 CAPSULE ORAL at 09:10

## 2018-10-22 RX ADMIN — INSULIN DETEMIR 5 UNITS: 100 INJECTION, SOLUTION SUBCUTANEOUS at 08:10

## 2018-10-22 RX ADMIN — FLUCONAZOLE 200 MG: 200 TABLET ORAL at 08:10

## 2018-10-22 RX ADMIN — LEVOFLOXACIN 250 MG: 250 TABLET, FILM COATED ORAL at 08:10

## 2018-10-22 RX ADMIN — PANTOPRAZOLE SODIUM 40 MG: 40 INJECTION, POWDER, FOR SOLUTION INTRAVENOUS at 09:10

## 2018-10-22 RX ADMIN — TACROLIMUS 0.5 MG: 0.5 CAPSULE ORAL at 05:10

## 2018-10-22 RX ADMIN — MIRTAZAPINE 7.5 MG: 7.5 TABLET ORAL at 09:10

## 2018-10-22 RX ADMIN — AMOXICILLIN 500 MG: 500 CAPSULE ORAL at 09:10

## 2018-10-22 RX ADMIN — URSODIOL 300 MG: 300 CAPSULE ORAL at 08:10

## 2018-10-22 RX ADMIN — INSULIN ASPART 14 UNITS: 100 INJECTION, SOLUTION INTRAVENOUS; SUBCUTANEOUS at 06:10

## 2018-10-22 RX ADMIN — BISACODYL 10 MG: 5 TABLET, COATED ORAL at 09:10

## 2018-10-22 RX ADMIN — MIDODRINE HYDROCHLORIDE 15 MG: 5 TABLET ORAL at 09:10

## 2018-10-22 RX ADMIN — MAGNESIUM OXIDE TAB 400 MG (241.3 MG ELEMENTAL MG) 400 MG: 400 (241.3 MG) TAB at 08:10

## 2018-10-22 RX ADMIN — INSULIN ASPART 14 UNITS: 100 INJECTION, SOLUTION INTRAVENOUS; SUBCUTANEOUS at 01:10

## 2018-10-22 RX ADMIN — POTASSIUM BICARBONATE 50 MEQ: 25 TABLET, EFFERVESCENT ORAL at 08:10

## 2018-10-22 RX ADMIN — PANTOPRAZOLE SODIUM 40 MG: 40 INJECTION, POWDER, FOR SOLUTION INTRAVENOUS at 08:10

## 2018-10-22 RX ADMIN — INSULIN ASPART 14 UNITS: 100 INJECTION, SOLUTION INTRAVENOUS; SUBCUTANEOUS at 08:10

## 2018-10-22 RX ADMIN — MAGNESIUM OXIDE TAB 400 MG (241.3 MG ELEMENTAL MG) 400 MG: 400 (241.3 MG) TAB at 09:10

## 2018-10-22 RX ADMIN — MIDODRINE HYDROCHLORIDE 15 MG: 5 TABLET ORAL at 08:10

## 2018-10-22 RX ADMIN — INSULIN ASPART 2 UNITS: 100 INJECTION, SOLUTION INTRAVENOUS; SUBCUTANEOUS at 01:10

## 2018-10-22 RX ADMIN — INSULIN ASPART 2 UNITS: 100 INJECTION, SOLUTION INTRAVENOUS; SUBCUTANEOUS at 06:10

## 2018-10-22 RX ADMIN — MYCOPHENOLATE MOFETIL 1000 MG: 250 CAPSULE ORAL at 09:10

## 2018-10-22 RX ADMIN — PREDNISONE 15 MG: 5 TABLET ORAL at 08:10

## 2018-10-22 RX ADMIN — INSULIN ASPART 1 UNITS: 100 INJECTION, SOLUTION INTRAVENOUS; SUBCUTANEOUS at 11:10

## 2018-10-22 RX ADMIN — SODIUM BICARBONATE 650 MG TABLET 1300 MG: at 08:10

## 2018-10-22 RX ADMIN — SULFAMETHOXAZOLE AND TRIMETHOPRIM 1 TABLET: 400; 80 TABLET ORAL at 08:10

## 2018-10-22 RX ADMIN — MYCOPHENOLATE MOFETIL 1000 MG: 250 CAPSULE ORAL at 08:10

## 2018-10-22 RX ADMIN — MIDODRINE HYDROCHLORIDE 15 MG: 5 TABLET ORAL at 03:10

## 2018-10-22 RX ADMIN — VALGANCICLOVIR 450 MG: 450 TABLET, FILM COATED ORAL at 08:10

## 2018-10-22 RX ADMIN — FUROSEMIDE 80 MG: 10 INJECTION, SOLUTION INTRAMUSCULAR; INTRAVENOUS at 08:10

## 2018-10-22 RX ADMIN — LIDOCAINE HYDROCHLORIDE 10 ML: 10 INJECTION, SOLUTION INFILTRATION; PERINEURAL at 03:10

## 2018-10-22 RX ADMIN — ONDANSETRON 8 MG: 8 TABLET, ORALLY DISINTEGRATING ORAL at 10:10

## 2018-10-22 RX ADMIN — SODIUM BICARBONATE 650 MG TABLET 1300 MG: at 09:10

## 2018-10-22 RX ADMIN — TACROLIMUS 1 MG: 1 CAPSULE ORAL at 08:10

## 2018-10-22 NOTE — PLAN OF CARE
Problem: Patient Care Overview  Goal: Plan of Care Review  Outcome: Ongoing (interventions implemented as appropriate)  Pt is AAOx4 in bed wearing non-skid footwear, bed in low/locked position and with call bell within reach. Pt reminded to use call bell to call for assistance, pt verbalizes understanding. Daughter at bedside. Pt is afebrile at this time. Proper hand hygiene performed before and after pt care activities. Chevron incision JULIANE with staples free from SSI and painted with betadine per patient's daughter. Right side old VALORIE site started leaking copious amounts of yellow drainage at end of dayshift. Ostomy bag placed on area at that time. Site put out 1,500cc during first two hours of nightshift. BP dropped to 90s/50s-60s. MD notified and 500cc Albumin given. Site has now stopped leaking while patient is laying in bed sleeping. Midnight H&H dropped to 7.7/23.6. Type and screen sent. MD aware and RN continuing to closely monitor patient. Denies any pain or discomfort at this time.

## 2018-10-22 NOTE — ASSESSMENT & PLAN NOTE
- Creatinine on arrival 0.8 and trended up  -BUN/cr (10/11/18): 91/1.9   -Tacrolimus 26.9 (10/08)  Nephrology consulted  Renal US (10/11): no hydronephrosis  Echo 10/12: diastolic dysfunction, likely 2/2 to ARF  - pt on renal diet w 1500 ml FR.  -CRRT 10/14; SLED 10/17, 10/20  -Repeat US 10/22

## 2018-10-22 NOTE — ASSESSMENT & PLAN NOTE
- dietary consulted.   - temporal and distal extremity muscle wasting and edema  - encourage supplements and to increase nutritional intake   - pt on renal/low K+ and low fat diet

## 2018-10-22 NOTE — ASSESSMENT & PLAN NOTE
- Fluctuating H/H.    -Transfuse 10/14 and 10/16 (1 unit); 10/18 (2 units); 10/19 (1 unit)  -Decreased platelets -- 1 u plt given 10/18   -DDAVP given 10/18 and 10/19  -FOBT +  -LDH elevated, Hapto < 10  -EGD 10/18: ulcerated duodenal mucosa   Monitoring w/ CBC q12hr to assess the need for transfusion

## 2018-10-22 NOTE — ASSESSMENT & PLAN NOTE
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      - acute gradual worsening of renal function s/p OLT  - 10/06 was taken to the OR for possibility of billiary leak and post op complication due to hypercapnic hypoxic respiratory failure, no blood pressure drop intraoperatively per anesthesiology notes. Patient has not been needing vasopressors to maintain BP  - Creatinine on arrival 0.8 and has been up trending   - BUN/cr (10/11/18): 91/1.9; (10/12/18): 100/2.0; (10/13/18): 110/2.4  - Tacrolimus 26.9 (10/08); 6.8 (10/12); 5.6 (10/13)  - Protein Creatine ratio: 0.82  - UOP: 900cc / 24 hrs  - UA consistent with hematuria, proteinuria, RBC and amorphous casts   - Fena 0.5%; consistent with hypovolemia   - Renal US negative for obstruction, stricture or, hydronephrosis    Assessment: Acute Renal failure in setting of hepatic ischemia reperfusion injury and poor perfusion status vs acute tubular necrosis from immunosuppressive agents      Plan:  -  UO adequate with current diuresis, continue diureses w/ Lasix 80 mg BID daily.   - avoid nephrotoxic drugs, ACEI/ARB, NSAIDS  - adjust drugs to GFR: bactrim   - Repeat tacrolimus levels and consider renal adjustment  - Agree with fluid restriction of 1500 cc/day  - Acute RRT not indicated at this point

## 2018-10-22 NOTE — PROGRESS NOTES
Notified Dr. Driver on call for LTS of patient's midnight H&H 7.7/23.6 (decreased from 9.5/29.3 at 1800). No signs of acute bleeding. BP currently 93/62 HR 87. 500cc Albumin was given at 2300 after 1,500cc drained from previous VALORIE site.  Instructed to call MD if SBP drops <90 for possible blood transfusion orders.

## 2018-10-22 NOTE — PROGRESS NOTES
[facility]  Nephrology  Progress Note    Patient Name: Scarlett Reddy  MRN: [id number]  Admission Date: 10/1/2018  Hospital Length of Stay: 20 days  Attending Provider: Jani Theodore MD   Primary Care Physician: Primary Doctor No  Principal Problem:Liver transplanted    Subjective:     HPI: 69 y/o female with ESLD due to Hep B and D cirrhosis MELD of 23 on 10/01/18.  Paracentesis 10/1 with 5L removed, no fluid sent for analysis. 10/02 significant for hyponatremia, Na 119 and she was admitted to LTS for sodium monitoring. On 10/05/18 she is s/p DBDLT (CMV D+/R+, steroid induction, MMF/tacro/pred maintenance) with take back on 10/6 secondary to hyperbilirubinema, no leak identified. Post-op course complicated by hypercapneic and hypoxic respiratory failure and was intubated 10/06 and extubated 10/07. Liver bx (10/6) sig for Zone 3 hemorrhage and collapse, in addition to cholestasis. ID consulted to comment on positive diphtheroid culture from ascitic fluid (likely skin colonization) as well as ESBL Klebsiella pneumoniae in urine culture (10/4).  Pt asymptomatic and cell count from ascitic fluid unremarkable. Mild peritransplant ascites- repeat US 10/6 with increased arterial resistive indices. Repeat US 10/9 w/ continued elevation in RIs and fluid collections. LFTs trending down nicely.     Patient is currently on entecavir 0.5 mg, Hepagam, mycophenalate 1000 mg BID, prednisone 20 mg and prophylaxis with Bactrim, fluconazole and Valgancyclovir.    Nephrology consulted for acute kidney failure is setting of Orthotopic Liver Transplant. Cr 0.8 on 10/05, started to trend up and 1.5 (10/09), 1.8 (10/10), and 1.9 (10/11). UOP 10/10/18: 520 ml. UA (10/09): 1+ occult blood and 1+ protein with RBC and amorphous casts.        Interval History: No acute events overnight. Patient continues to have good urine output - 700, she feels tired in general. She also has 1000cc of output in her ostomy bag.   Review of  patient's allergies indicates:  No Known Allergies  Current Facility-Administered Medications   Medication Frequency    0.9%  NaCl infusion (for blood administration) Q24H PRN    amoxicillin capsule 500 mg Q12H    bisacodyl EC tablet 10 mg Daily PRN    bisacodyl suppository 10 mg Daily PRN    calcium carbonate 200 mg calcium (500 mg) chewable tablet 500 mg TID PRN    dextrose 50% injection 12.5 g PRN    dextrose 50% injection 25 g PRN    docusate sodium capsule 100 mg TID PRN    entecavir tablet 0.5 mg Q72H    ergocalciferol capsule 50,000 Units Q7 Days    fluconazole tablet 200 mg Daily    furosemide injection 80 mg BID    glucagon (human recombinant) injection 1 mg PRN    glucose chewable tablet 16 g PRN    glucose chewable tablet 24 g PRN    insulin aspart U-100 pen 0-5 Units QID (AC + HS) PRN    insulin aspart U-100 pen 14 Units TIDWM    insulin detemir U-100 pen 4 Units QHS    insulin detemir U-100 pen 5 Units Daily    levoFLOXacin tablet 250 mg Daily    lidocaine HCL 10 mg/ml (1%) injection 10 mL Once    magnesium oxide tablet 400 mg BID    midodrine tablet 15 mg TID    mirtazapine tablet 7.5 mg QHS    mycophenolate capsule 1,000 mg BID    ondansetron disintegrating tablet 8 mg Q8H PRN    ondansetron injection 4 mg Q8H PRN    oxyCODONE immediate release tablet 5 mg Q4H PRN    oxyCODONE immediate release tablet Tab 10 mg Q4H PRN    pantoprazole injection 40 mg BID    polyethylene glycol packet 17 g Daily PRN    predniSONE tablet 15 mg Daily    Followed by    [START ON 10/24/2018] predniSONE tablet 10 mg Daily    Followed by    [START ON 10/31/2018] predniSONE tablet 5 mg Daily    Followed by    [START ON 11/7/2018] predniSONE tablet 2.5 mg Daily    promethazine (PHENERGAN) 6.25 mg in dextrose 5 % 50 mL IVPB Q6H PRN    sodium bicarbonate tablet 1,300 mg BID    sodium chloride 0.9% flush 3 mL PRN    sulfamethoxazole-trimethoprim 400-80mg per tablet 1 tablet Every Mon, Wed,  Fri    tacrolimus capsule 0.5 mg BID    ursodiol capsule 300 mg BID    valGANciclovir tablet 450 mg Twice Weekly       Objective:     Vital Signs (Most Recent):  Temp: (!) 95.3 °F (35.2 °C) (10/22/18 1330)  Pulse: 88 (10/22/18 1548)  Resp: 15 (10/22/18 1548)  BP: 106/71 (10/22/18 1548)  SpO2: 100 % (10/22/18 1548)  O2 Device (Oxygen Therapy): room air (10/22/18 1200) Vital Signs (24h Range):  Temp:  [95.3 °F (35.2 °C)-97.4 °F (36.3 °C)] 95.3 °F (35.2 °C)  Pulse:  [] 88  Resp:  [9-21] 15  SpO2:  [96 %-100 %] 100 %  BP: ()/(52-79) 106/71     Weight: 72.2 kg (159 lb 3.2 oz) (10/22/18 0500)  Body mass index is 28.2 kg/m².  Body surface area is 1.79 meters squared.    I/O last 3 completed shifts:  In: 1050 [P.O.:1050]  Out: 5401 [Urine:1250; Other:4151]    Physical Exam   Constitutional: She is oriented to person, place, and time.   Temporal and distal extremity muscle wasting   Eyes: EOM are normal. Pupils are equal, round, and reactive to light.   Neck: Normal range of motion.   Cardiovascular: Normal rate and regular rhythm.   Pulmonary/Chest: Effort normal and breath sounds normal. No respiratory distress. She has no wheezes.   Abdominal: Soft. Bowel sounds are normal. She exhibits distension. There is tenderness. There is no guarding.   Musculoskeletal: Normal range of motion. She exhibits edema.   Neurological: She is alert and oriented to person, place, and time.   Skin: Skin is warm and dry.   Nursing note and vitals reviewed.      Significant Labs:  CBC:   Recent Labs   Lab 10/22/18  0600   WBC 4.86  4.86   RBC 2.43*  2.43*   HGB 7.2*  7.2*   HCT 22.1*  22.1*   PLT 37*  37*   MCV 91  91   MCH 29.6  29.6   MCHC 32.6  32.6     CMP:   Recent Labs   Lab 10/22/18  0600   *   CALCIUM 8.4*   ALBUMIN 2.6*   PROT 3.7*   *   K 3.9   CO2 24   CL 97   BUN 67*   CREATININE 1.5*   ALKPHOS 86   ALT 22   AST 21   BILITOT 3.3*     All labs within the past 24 hours have been reviewed.      Significant Imaging:  Labs: Reviewed    Assessment/Plan:      Post LT - Acute renal failure    Post-LT EVA is multifactorial in origin and has been related to the severity of liver disease, toxicity of immunosuppressive therapy and hepatic ischaemia reperfusion injury might be a driving force in the aetiology of post-LT EVA.      - acute gradual worsening of renal function s/p OLT  - 10/06 was taken to the OR for possibility of billiary leak and post op complication due to hypercapnic hypoxic respiratory failure, no blood pressure drop intraoperatively per anesthesiology notes. Patient has not been needing vasopressors to maintain BP  - Creatinine on arrival 0.8 and has been up trending   - BUN/cr (10/11/18): 91/1.9; (10/12/18): 100/2.0; (10/13/18): 110/2.4  - Tacrolimus 26.9 (10/08); 6.8 (10/12); 5.6 (10/13)  - Protein Creatine ratio: 0.82  - UOP: 900cc / 24 hrs  - UA consistent with hematuria, proteinuria, RBC and amorphous casts   - Fena 0.5%; consistent with hypovolemia   - Renal US negative for obstruction, stricture or, hydronephrosis    Assessment: Acute Renal failure in setting of hepatic ischemia reperfusion injury and poor perfusion status vs acute tubular necrosis from immunosuppressive agents      Plan:  -  UO adequate with current diuresis, continue diureses w/ Lasix 80 mg BID daily.   - avoid nephrotoxic drugs, ACEI/ARB, NSAIDS  - adjust drugs to GFR: bactrim   - Repeat tacrolimus levels and consider renal adjustment  - Agree with fluid restriction of 1500 cc/day  - Acute RRT not indicated at this point                               Thank you for your consult. I will follow-up with patient. Please contact us if you have any additional questions.    Vaishali Ji MD  Nephrology  [facility]

## 2018-10-22 NOTE — SUBJECTIVE & OBJECTIVE
Scheduled Meds:   amoxicillin  500 mg Oral Q12H    entecavir  0.5 mg Oral Q72H    ergocalciferol  50,000 Units Oral Q7 Days    fluconazole  200 mg Oral Daily    furosemide  80 mg Intravenous BID    insulin aspart U-100  14 Units Subcutaneous TIDWM    insulin detemir U-100  4 Units Subcutaneous QHS    insulin detemir U-100  5 Units Subcutaneous Daily    levoFLOXacin  250 mg Oral Daily    magnesium oxide  400 mg Oral BID    midodrine  15 mg Oral TID    mirtazapine  7.5 mg Oral QHS    mycophenolate  1,000 mg Oral BID    pantoprazole  40 mg Intravenous BID    predniSONE  15 mg Oral Daily    Followed by    [START ON 10/24/2018] predniSONE  10 mg Oral Daily    Followed by    [START ON 10/31/2018] predniSONE  5 mg Oral Daily    Followed by    [START ON 11/7/2018] predniSONE  2.5 mg Oral Daily    sodium bicarbonate  1,300 mg Oral BID    sulfamethoxazole-trimethoprim 400-80mg  1 tablet Oral Every Mon, Wed, Fri    tacrolimus  1 mg Oral BID    ursodiol  300 mg Oral BID    valGANciclovir  450 mg Oral Twice Weekly     Continuous Infusions:  PRN Meds:sodium chloride, bisacodyl, bisacodyl, calcium carbonate, dextrose 50%, dextrose 50%, docusate sodium, glucagon (human recombinant), glucose, glucose, insulin aspart U-100, ondansetron, ondansetron, oxyCODONE, oxyCODONE, polyethylene glycol, promethazine (PHENERGAN) IVPB, sodium chloride 0.9%    Review of Systems   Constitutional: Positive for activity change and appetite change. Negative for fever.   Respiratory: Negative for cough and shortness of breath.    Cardiovascular: Positive for leg swelling. Negative for chest pain.   Gastrointestinal: Positive for abdominal distention and abdominal pain. Negative for diarrhea and vomiting.   Genitourinary: Negative for decreased urine volume, difficulty urinating and dysuria.   Musculoskeletal: Negative for arthralgias and back pain.   Allergic/Immunologic: Positive for immunocompromised state.   Neurological:  Positive for weakness. Negative for tremors and headaches.     Objective:     Vital Signs (Most Recent):  Temp: 97.4 °F (36.3 °C) (10/22/18 0749)  Pulse: 99 (10/22/18 1130)  Resp: 13 (10/22/18 1130)  BP: 109/73 (10/22/18 1130)  SpO2: 98 % (10/22/18 1130) Vital Signs (24h Range):  Temp:  [96.4 °F (35.8 °C)-97.4 °F (36.3 °C)] 97.4 °F (36.3 °C)  Pulse:  [] 99  Resp:  [9-21] 13  SpO2:  [96 %-100 %] 98 %  BP: ()/(52-79) 109/73     Weight: 72.2 kg (159 lb 3.2 oz)  Body mass index is 28.2 kg/m².    Intake/Output - Last 3 Shifts       10/20 0700 - 10/21 0659 10/21 0700 - 10/22 0659 10/22 0700 - 10/23 0659    P.O. 870 720     Total Intake(mL/kg) 870 (12.2) 720 (10)     Urine (mL/kg/hr) 1800 (1.1) 700 (0.4) 500 (1.2)    Emesis/NG output 0 0     Other 2371 1780 950    Stool 0 0     Blood 0 0     Total Output 4171 2480 1450    Net -3301 -1760 -1450           Urine Occurrence 0 x 1 x     Stool Occurrence 1 x 0 x     Emesis Occurrence 0 x 0 x           Physical Exam   Constitutional: She is oriented to person, place, and time.   Temporal and distal extremity muscle wasting   Eyes: EOM are normal. Pupils are equal, round, and reactive to light.   Neck: Normal range of motion.   Cardiovascular: Normal rate and regular rhythm.   Pulmonary/Chest: Effort normal and breath sounds normal. No respiratory distress. She has no wheezes.   Abdominal: Soft. Bowel sounds are normal. She exhibits distension. There is tenderness. There is no guarding.   Chevron incision w/ staples, CDI  RLQ ostomy bag w/ yellow, milky drainage.   Musculoskeletal: Normal range of motion. She exhibits edema.   Neurological: She is alert and oriented to person, place, and time.   Skin: Skin is warm and dry.   Nursing note and vitals reviewed.      Laboratory:  Immunosuppressants         Stop Route Frequency     tacrolimus capsule 1 mg      -- Oral 2 times daily     mycophenolate capsule 1,000 mg      -- Oral 2 times daily        CBC:   Recent Labs   Lab  10/22/18  0600   WBC 4.86  4.86   RBC 2.43*  2.43*   HGB 7.2*  7.2*   HCT 22.1*  22.1*   PLT 37*  37*   MCV 91  91   MCH 29.6  29.6   MCHC 32.6  32.6     CMP:   Recent Labs   Lab 10/22/18  0600   *   CALCIUM 8.4*   ALBUMIN 2.6*   PROT 3.7*   *   K 3.9   CO2 24   CL 97   BUN 67*   CREATININE 1.5*   ALKPHOS 86   ALT 22   AST 21   BILITOT 3.3*     Labs within the past 24 hours have been reviewed.    Diagnostic Results:  I have personally reviewed all pertinent imaging studies.

## 2018-10-22 NOTE — PROGRESS NOTES
Informed long of output from old uvaldo site with ostomy bag covering of 1000cc thus far in shift.  No new orders at this time

## 2018-10-22 NOTE — PROGRESS NOTES
[facility]  Liver Transplant  Progress Note    Patient Name: Scarlett Reddy  MRN: [id number]  Admission Date: 10/1/2018  Hospital Length of Stay: 20 days  Code Status: Full Code  Primary Care Provider: Primary Doctor No  Post-Operative Day: 17    ORGAN:   LIVER  Disease Etiology: Cirrhosis: Type B and D  Donor Type:    - Brain Death  CDC High Risk:   No  Donor CMV Status:   Donor CMV Status: Positive  Donor HBcAB:   Negative  Donor HCV Status:   Negative  Donor HBV SETH: Negative  Donor HCV SETH: Negative  Whole or Partial: Whole Liver  Biliary Anastomosis: End to End  Arterial Anatomy: Replaced Left Hepatic and Right Hepatic  Subjective:     History of Present Illness:  Ms. Reddy is a 67 y/o female with PMH of ESLD secondary Hep B and D.  Listed for liver transplant with MELD 23.  Paracentesis 10/1 with 5L removed, no fluid sent for analysis.  Morning labs significant for hyponatremia, Na 119.  Pt admitted to LTS for sodium monitoring.        Hospital Course:  Hospital Course: 67 y/o F h/o HBV/delta cirrhosis s/p DBDLT 10/5/18 (CMV D+/R+, steroid induction, MMF/tacro/pred maintenance) with take back on 10/6 2/2 hyperbilirubinema and bilious VALORIE drainage, no leak identified. Post-op course c/b hypercapneic and hypoxic respiratory failure and was reintubated though quickly extubated now doing well. Liver bx (10/6) sig for Zone 3 hemorrhage and collapse, in addition to cholestasis. Transferred from ICU on POD #4. ID consulted to comment on positive diphtheroid culture from ascitic fluid (likely skin colonization) as well as ESBL Klebsiella pneumoniae in urine culture (10/4).  Pt asymptomatic and cell count from ascitic fluid unremarkable. Per ID recs, no need to treat diphtheroids or asymptomatic ESBL Kleb pneumo bacteriuria though pt has had 6 days of therapy which would cover these organisms. Mild peritransplant ascites- repeat US 10/6 with increased arterial resistive indices. Repeat US 10/9  w/ continued elevation in RIs and fluid collections. LFTs trending down nicely.     Acute gradual worsening of renal function s/p OLT.Creatinine on arrival 0.8 and has been up trending. Nephrology consulted for post-op EVA. Remains on renal/low K diet. Patient continued to be diuresed with lasix. BUN and Cr remain elevated requiring multiple rounds of dialysis (10/15, 10/17, 10/20). Additionally, pt w/ anemia requiring multiple blood transfusions (10/14, 10/16, 10/18, 10/19). Will cont close monitoring H/H, transfuse as needed with goal Hb > 7.0. Decreased platelets, improvements w/ 2 u plts (10/18, 10/19) and DDAVP.   Pt reported dark stools 10/15 but color has normalized. No blood noted on MORELIA. FOBT+. EGD 10/18 consistent with ulcerated mucosa in duodenum s/p coagulation. Biopsy pending. Concern for H. Pylori. Started Amoxicillin/levofloxacin (10/19). Remains on Protonix 40 mg BID.   Of note, Urine cx + Klebsiella pneumoniae (10/13). Received 3 day course of ciprofloxacin, dc'd 10/19.   On 10/19 pm pt c/o crushing chest pain- cardiac workup initiated. Troponin 0.094, likely 2/2 ischemic stress. EKG NSR.     Interval history: Overnight, RLQ lateral VALORIE site draining serous fluid. Ostomy bag placed, with about 1780 cc output yesterday. Fluid sent for TAG to evaluate for chyle leak. Bag removed and stitch placed today. Pt feeling a little better than yesterday but remains weak. Giving 1 u pRBC for decreased H/H. Repeating liver US today. Cont Lasix 80 BID. No BM yesterday. Laxative given.  Pt eating well. Placed on renal/low K and low fat diet.  Monitor    Scheduled Meds:   amoxicillin  500 mg Oral Q12H    entecavir  0.5 mg Oral Q72H    ergocalciferol  50,000 Units Oral Q7 Days    fluconazole  200 mg Oral Daily    furosemide  80 mg Intravenous BID    insulin aspart U-100  14 Units Subcutaneous TIDWM    insulin detemir U-100  4 Units Subcutaneous QHS    insulin detemir U-100  5 Units Subcutaneous Daily     levoFLOXacin  250 mg Oral Daily    magnesium oxide  400 mg Oral BID    midodrine  15 mg Oral TID    mirtazapine  7.5 mg Oral QHS    mycophenolate  1,000 mg Oral BID    pantoprazole  40 mg Intravenous BID    predniSONE  15 mg Oral Daily    Followed by    [START ON 10/24/2018] predniSONE  10 mg Oral Daily    Followed by    [START ON 10/31/2018] predniSONE  5 mg Oral Daily    Followed by    [START ON 11/7/2018] predniSONE  2.5 mg Oral Daily    sodium bicarbonate  1,300 mg Oral BID    sulfamethoxazole-trimethoprim 400-80mg  1 tablet Oral Every Mon, Wed, Fri    tacrolimus  1 mg Oral BID    ursodiol  300 mg Oral BID    valGANciclovir  450 mg Oral Twice Weekly     Continuous Infusions:  PRN Meds:sodium chloride, bisacodyl, bisacodyl, calcium carbonate, dextrose 50%, dextrose 50%, docusate sodium, glucagon (human recombinant), glucose, glucose, insulin aspart U-100, ondansetron, ondansetron, oxyCODONE, oxyCODONE, polyethylene glycol, promethazine (PHENERGAN) IVPB, sodium chloride 0.9%    Review of Systems   Constitutional: Positive for activity change and appetite change. Negative for fever.   Respiratory: Negative for cough and shortness of breath.    Cardiovascular: Positive for leg swelling. Negative for chest pain.   Gastrointestinal: Positive for abdominal distention and abdominal pain. Negative for diarrhea and vomiting.   Genitourinary: Negative for decreased urine volume, difficulty urinating and dysuria.   Musculoskeletal: Negative for arthralgias and back pain.   Allergic/Immunologic: Positive for immunocompromised state.   Neurological: Positive for weakness. Negative for tremors and headaches.     Objective:     Vital Signs (Most Recent):  Temp: 97.4 °F (36.3 °C) (10/22/18 0749)  Pulse: 99 (10/22/18 1130)  Resp: 13 (10/22/18 1130)  BP: 109/73 (10/22/18 1130)  SpO2: 98 % (10/22/18 1130) Vital Signs (24h Range):  Temp:  [96.4 °F (35.8 °C)-97.4 °F (36.3 °C)] 97.4 °F (36.3 °C)  Pulse:  []  99  Resp:  [9-21] 13  SpO2:  [96 %-100 %] 98 %  BP: ()/(52-79) 109/73     Weight: 72.2 kg (159 lb 3.2 oz)  Body mass index is 28.2 kg/m².    Intake/Output - Last 3 Shifts       10/20 0700 - 10/21 0659 10/21 0700 - 10/22 0659 10/22 0700 - 10/23 0659    P.O. 870 720     Total Intake(mL/kg) 870 (12.2) 720 (10)     Urine (mL/kg/hr) 1800 (1.1) 700 (0.4) 500 (1.2)    Emesis/NG output 0 0     Other 2371 1780 950    Stool 0 0     Blood 0 0     Total Output 4171 2480 1450    Net -3301 -1760 -1450           Urine Occurrence 0 x 1 x     Stool Occurrence 1 x 0 x     Emesis Occurrence 0 x 0 x           Physical Exam   Constitutional: She is oriented to person, place, and time.   Temporal and distal extremity muscle wasting   Eyes: EOM are normal. Pupils are equal, round, and reactive to light.   Neck: Normal range of motion.   Cardiovascular: Normal rate and regular rhythm.   Pulmonary/Chest: Effort normal and breath sounds normal. No respiratory distress. She has no wheezes.   Abdominal: Soft. Bowel sounds are normal. She exhibits distension. There is tenderness. There is no guarding.   Chevron incision w/ staples, CDI  RLQ ostomy bag w/ yellow, milky drainage.   Musculoskeletal: Normal range of motion. She exhibits edema.   Neurological: She is alert and oriented to person, place, and time.   Skin: Skin is warm and dry.   Nursing note and vitals reviewed.      Laboratory:  Immunosuppressants         Stop Route Frequency     tacrolimus capsule 1 mg      -- Oral 2 times daily     mycophenolate capsule 1,000 mg      -- Oral 2 times daily        CBC:   Recent Labs   Lab 10/22/18  0600   WBC 4.86  4.86   RBC 2.43*  2.43*   HGB 7.2*  7.2*   HCT 22.1*  22.1*   PLT 37*  37*   MCV 91  91   MCH 29.6  29.6   MCHC 32.6  32.6     CMP:   Recent Labs   Lab 10/22/18  0600   *   CALCIUM 8.4*   ALBUMIN 2.6*   PROT 3.7*   *   K 3.9   CO2 24   CL 97   BUN 67*   CREATININE 1.5*   ALKPHOS 86   ALT 22   AST 21   BILITOT  3.3*     Labs within the past 24 hours have been reviewed.    Diagnostic Results:  I have personally reviewed all pertinent imaging studies.    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   -POD 1 US: increased arterial resistive indices, suggestive of edema vs cute rejection vs congestion  -Repeat US 10/9 with continued elevation of RIs  -LFTs trending down  -T bili trending down  -repeat liver US (10/11): elevated RIs  - LFTs normalizing            Duodenal ulcer    EGD 10/18- sig for ulcerated duodenal mucosa.   Biopsy pending  Cont protonix 40 BID  Started H. Pylori tx 10/19: Amoxicillin/Levo. Remains on PPI       UTI (urinary tract infection)    Urine Cx 10/15 w/ Klebsiella pneumonia  Started Ciprofloxacin 10/17. Dc'd 10/19. Coverage w/ h. pylori treatment  Pt asymptomatic at this time       Acute blood loss anemia    H/H cont to fluctuate.   FOBT +  EGD 10/18- showed diffuse bleeding with ulcerated duodenal mucosa   Monitoring w/ CBC q 12 to assess the need for transfusion            Post LT - Acute renal failure    - Creatinine on arrival 0.8 and trended up  -BUN/cr (10/11/18): 91/1.9   -Tacrolimus 26.9 (10/08)  Nephrology consulted  Renal US (10/11): no hydronephrosis  Echo 10/12: diastolic dysfunction, likely 2/2 to ARF  - pt on renal diet w 1500 ml FR.  -CRRT 10/14; SLED 10/17, 10/20  -Repeat US 10/22     Hypervolemia associated with renal insufficiency    - Diuresed w/ Lasix 100mg x 1 (10/12).  - CRRT 10/14.  .    -SLED 10/17, 10/20  -Diuresing w/ Lasix 80 BID and albumin as needed       EVA (acute kidney injury)           Metabolic acidosis    - Started Na bicarb 1300 QID.  -CRRT 10/14  -SLED 10/17, 10/20         At risk for opportunistic infections    - Valcyte for CMV prophylaxis  - Bactrim for PCP prophylaxis (MWF)  -  Fluconazole for fungal prophylaxis        Long-term use of immunosuppressant medication    -  Cellcept/Prograf/pred  -  Held prograf 10/8 for elevated levels  -  Resumed Prograf 10/10/19. Will adjust as needed.   -  Will monitor for signs of toxic side effects, check daily tacrolimus troughs, and change meds accordingly.       Prophylactic immunotherapy    - See long term use immunosuppresion       Ascites    - paracentesis 10/1, 5L removed, no fluid sent for analysis.  - diuresing with albumin and lasix 10/9.  - Per Nephrology, on 10/13- Lasix 80 mg bid and monitor.  No need for RRT.  -  10/14, pt w worsening edema.          Weakness    - see physical decond.       Physical deconditioning    - PT/OT consulted.  - Recommend home health PT and rolling walker at discharge        Hyponatremia    - Na 119 on admit.  -Na was improving post transplant w HD.  - Pt POD #9 w EVA  - Na stable at this time. Monitoring w/ daily labs            Hypotension    - continue Midodrine 15 mg TID.   Monitor.       Anemia requiring transfusions    - Fluctuating H/H.    -Transfuse 10/14 and 10/16 (1 unit); 10/18 (2 units); 10/19 (1 unit)  -Decreased platelets -- 1 u plt given 10/18   -DDAVP given 10/18 and 10/19  -FOBT +  -LDH elevated, Hapto < 10  -EGD 10/18: ulcerated duodenal mucosa   Monitoring w/ CBC q12hr to assess the need for transfusion       Severe malnutrition    - dietary consulted.   - temporal and distal extremity muscle wasting and edema  - encourage supplements and to increase nutritional intake   - pt on renal/low K+ and low fat diet     Type 2 diabetes mellitus with diabetic polyneuropathy, with long-term current use of insulin    - continue home regimen.  - endocrine consulted.  -Pt off insulin drip at this time.  Apprec endo recs.        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  -Tx w/ Entecavir- dose changed to q72 hours given renal function.             VTE Risk Mitigation  (From admission, onward)        Ordered     Place sequential compression device  Until discontinued      10/05/18 0709          The patients clinical status was discussed at multidisplinary rounds, involving transplant surgery, transplant medicine, pharmacy, nursing, nutrition, and social work    Discharge Planning:  No Patient Care Coordination Note on file.      Jihan Soares PA-C  Liver Transplant  [facility]

## 2018-10-22 NOTE — ASSESSMENT & PLAN NOTE
H/H cont to fluctuate.   FOBT +  EGD 10/18- showed diffuse bleeding with ulcerated duodenal mucosa   Monitoring w/ CBC q 12 to assess the need for transfusion

## 2018-10-22 NOTE — ASSESSMENT & PLAN NOTE
- paracentesis 10/1, 5L removed, no fluid sent for analysis.  - diuresing with albumin and lasix 10/9.  - Per Nephrology, on 10/13- Lasix 80 mg bid and monitor.  No need for RRT.  -  10/14, pt w worsening edema.

## 2018-10-22 NOTE — PROGRESS NOTES
Notified Dr. Driver on call for LTS of patient's output of 1,200cc from old VALORIE site over past two hours. BP is currently 100/67. MD OK with output. RN instructed to notify MD if BP decreases.

## 2018-10-22 NOTE — SUBJECTIVE & OBJECTIVE
"Interval HPI:   Overnight events: remains in TSU. BG slightly above goal overnight; required correction scale. FBG below goal. Prednisone 15 mg daily;standard steroid taper.   Eatin% or more; hospital and outside food; tends to eat over long periods of time complicating both BG readings and insulin administration.   Nausea: No  Hypoglycemia and intervention: No  Fever: No  TPN and/or TF: No    BP (!) 98/54 (BP Location: Right arm, Patient Position: Lying)   Pulse (!) 132   Temp 97.4 °F (36.3 °C) (Oral)   Resp (!) 21   Ht 5' 3" (1.6 m)   Wt 72.2 kg (159 lb 3.2 oz)   LMP  (LMP Unknown)   SpO2 96%   Breastfeeding? No   BMI 28.20 kg/m²     Labs Reviewed and Include    Recent Labs   Lab 10/22/18  0600   *   CALCIUM 8.4*   ALBUMIN 2.6*   PROT 3.7*   *   K 3.9   CO2 24   CL 97   BUN 67*   CREATININE 1.5*   ALKPHOS 86   ALT 22   AST 21   BILITOT 3.3*     Lab Results   Component Value Date    WBC 4.86 10/22/2018    WBC 4.86 10/22/2018    HGB 7.2 (L) 10/22/2018    HGB 7.2 (L) 10/22/2018    HCT 22.1 (L) 10/22/2018    HCT 22.1 (L) 10/22/2018    MCV 91 10/22/2018    MCV 91 10/22/2018    PLT 37 (LL) 10/22/2018    PLT 37 (LL) 10/22/2018     No results for input(s): TSH, FREET4 in the last 168 hours.  Lab Results   Component Value Date    HGBA1C 6.0 (H) 10/04/2018       Nutritional status:   Body mass index is 28.2 kg/m².  Lab Results   Component Value Date    ALBUMIN 2.6 (L) 10/22/2018    ALBUMIN 3.0 (L) 10/21/2018    ALBUMIN 3.0 (L) 10/21/2018    ALBUMIN 3.0 (L) 10/21/2018     Lab Results   Component Value Date    PREALBUMIN 12 (L) 2018       Estimated Creatinine Clearance: 33.7 mL/min (A) (based on SCr of 1.5 mg/dL (H)).    Accu-Checks  Recent Labs     10/20/18  0020 10/20/18  0827 10/20/18  1502 10/20/18  2001 10/21/18  0200 10/21/18  0904 10/21/18  1338 10/21/18  1810 10/22/18  0147 10/22/18  0822   POCTGLUCOSE 259* 211* 224* 273* 195* 178* 227* 224* 143* 147*       Current Medications and/or " Treatments Impacting Glycemic Control  Immunotherapy:    Immunosuppressants         Stop Route Frequency     tacrolimus capsule 1 mg      -- Oral 2 times daily     mycophenolate capsule 1,000 mg      -- Oral 2 times daily        Steroids:   Hormones (From admission, onward)    Start     Stop Route Frequency Ordered    11/07/18 0900  predniSONE tablet 2.5 mg      11/14 0859 Oral Daily 10/17/18 0943    10/31/18 0900  predniSONE tablet 5 mg      11/07 0859 Oral Daily 10/17/18 0943    10/24/18 0900  predniSONE tablet 10 mg      10/31 0859 Oral Daily 10/17/18 0943    10/17/18 0945  predniSONE tablet 15 mg      10/24 0859 Oral Daily 10/17/18 0943        Pressors:    Autonomic Drugs (From admission, onward)    Start     Stop Route Frequency Ordered    10/10/18 1500  midodrine tablet 15 mg      -- Oral 3 times daily 10/10/18 1122    10/06/18 1617  rocuronium (ZEMURON) 10 mg/mL injection     Comments:  Created by cabinet override    10/07 0429   10/06/18 1617        Hyperglycemia/Diabetes Medications:   Antihyperglycemics (From admission, onward)    Start     Stop Route Frequency Ordered    10/21/18 0730  insulin aspart U-100 pen 14 Units      -- SubQ 3 times daily with meals 10/21/18 0716    10/20/18 2100  insulin detemir U-100 pen 4 Units      -- SubQ Nightly 10/20/18 0735    10/20/18 0900  insulin detemir U-100 pen 5 Units      -- SubQ Daily 10/20/18 0735    10/14/18 0925  insulin aspart U-100 pen 0-5 Units      -- SubQ Before meals & nightly PRN 10/14/18 0825    10/07/18 1200  insulin regular (Humulin R) 100 Units in sodium chloride 0.9% 100 mL infusion      10/13 2100 IV Continuous 10/07/18 1058

## 2018-10-22 NOTE — PROGRESS NOTES
Notified Dr. Marvin with LTS of patient's 0600 H&H 7.2/22.1 (decreased from 7.7/23.6). Patient's current BP is 92/50 and HR 88. No new orders at this time, MD states will defer to LTS NPs for any interventions.

## 2018-10-22 NOTE — PT/OT/SLP PROGRESS
Physical Therapy      Patient Name:  Scarlett Reddy   MRN:  [id number]    Patient not seen today secondary to (Pt off floor for US at 1st attempt. Pt receiving blood at 2nd attempt.). Will follow-up at next scheduled session as able.    Radha Nguyen, PT, DPT   10/22/2018  [phone]

## 2018-10-22 NOTE — SUBJECTIVE & OBJECTIVE
Interval History: No acute events overnight. Patient continues to have good urine output - 700, she feels tired in general. She also has 1000cc of output in her ostomy bag.   Review of patient's allergies indicates:  No Known Allergies  Current Facility-Administered Medications   Medication Frequency    0.9%  NaCl infusion (for blood administration) Q24H PRN    amoxicillin capsule 500 mg Q12H    bisacodyl EC tablet 10 mg Daily PRN    bisacodyl suppository 10 mg Daily PRN    calcium carbonate 200 mg calcium (500 mg) chewable tablet 500 mg TID PRN    dextrose 50% injection 12.5 g PRN    dextrose 50% injection 25 g PRN    docusate sodium capsule 100 mg TID PRN    entecavir tablet 0.5 mg Q72H    ergocalciferol capsule 50,000 Units Q7 Days    fluconazole tablet 200 mg Daily    furosemide injection 80 mg BID    glucagon (human recombinant) injection 1 mg PRN    glucose chewable tablet 16 g PRN    glucose chewable tablet 24 g PRN    insulin aspart U-100 pen 0-5 Units QID (AC + HS) PRN    insulin aspart U-100 pen 14 Units TIDWM    insulin detemir U-100 pen 4 Units QHS    insulin detemir U-100 pen 5 Units Daily    levoFLOXacin tablet 250 mg Daily    lidocaine HCL 10 mg/ml (1%) injection 10 mL Once    magnesium oxide tablet 400 mg BID    midodrine tablet 15 mg TID    mirtazapine tablet 7.5 mg QHS    mycophenolate capsule 1,000 mg BID    ondansetron disintegrating tablet 8 mg Q8H PRN    ondansetron injection 4 mg Q8H PRN    oxyCODONE immediate release tablet 5 mg Q4H PRN    oxyCODONE immediate release tablet Tab 10 mg Q4H PRN    pantoprazole injection 40 mg BID    polyethylene glycol packet 17 g Daily PRN    predniSONE tablet 15 mg Daily    Followed by    [START ON 10/24/2018] predniSONE tablet 10 mg Daily    Followed by    [START ON 10/31/2018] predniSONE tablet 5 mg Daily    Followed by    [START ON 11/7/2018] predniSONE tablet 2.5 mg Daily    promethazine (PHENERGAN) 6.25 mg in dextrose 5  % 50 mL IVPB Q6H PRN    sodium bicarbonate tablet 1,300 mg BID    sodium chloride 0.9% flush 3 mL PRN    sulfamethoxazole-trimethoprim 400-80mg per tablet 1 tablet Every Mon, Wed, Fri    tacrolimus capsule 0.5 mg BID    ursodiol capsule 300 mg BID    valGANciclovir tablet 450 mg Twice Weekly       Objective:     Vital Signs (Most Recent):  Temp: (!) 95.3 °F (35.2 °C) (10/22/18 1330)  Pulse: 88 (10/22/18 1548)  Resp: 15 (10/22/18 1548)  BP: 106/71 (10/22/18 1548)  SpO2: 100 % (10/22/18 1548)  O2 Device (Oxygen Therapy): room air (10/22/18 1200) Vital Signs (24h Range):  Temp:  [95.3 °F (35.2 °C)-97.4 °F (36.3 °C)] 95.3 °F (35.2 °C)  Pulse:  [] 88  Resp:  [9-21] 15  SpO2:  [96 %-100 %] 100 %  BP: ()/(52-79) 106/71     Weight: 72.2 kg (159 lb 3.2 oz) (10/22/18 0500)  Body mass index is 28.2 kg/m².  Body surface area is 1.79 meters squared.    I/O last 3 completed shifts:  In: 1050 [P.O.:1050]  Out: 5401 [Urine:1250; Other:4151]    Physical Exam   Constitutional: She is oriented to person, place, and time.   Temporal and distal extremity muscle wasting   Eyes: EOM are normal. Pupils are equal, round, and reactive to light.   Neck: Normal range of motion.   Cardiovascular: Normal rate and regular rhythm.   Pulmonary/Chest: Effort normal and breath sounds normal. No respiratory distress. She has no wheezes.   Abdominal: Soft. Bowel sounds are normal. She exhibits distension. There is tenderness. There is no guarding.   Musculoskeletal: Normal range of motion. She exhibits edema.   Neurological: She is alert and oriented to person, place, and time.   Skin: Skin is warm and dry.   Nursing note and vitals reviewed.      Significant Labs:  CBC:   Recent Labs   Lab 10/22/18  0600   WBC 4.86  4.86   RBC 2.43*  2.43*   HGB 7.2*  7.2*   HCT 22.1*  22.1*   PLT 37*  37*   MCV 91  91   MCH 29.6  29.6   MCHC 32.6  32.6     CMP:   Recent Labs   Lab 10/22/18  0600   *   CALCIUM 8.4*   ALBUMIN 2.6*    PROT 3.7*   *   K 3.9   CO2 24   CL 97   BUN 67*   CREATININE 1.5*   ALKPHOS 86   ALT 22   AST 21   BILITOT 3.3*     All labs within the past 24 hours have been reviewed.     Significant Imaging:  Labs: Reviewed

## 2018-10-23 LAB
ALBUMIN SERPL BCP-MCNC: 2.7 G/DL
ALP SERPL-CCNC: 133 U/L
ALT SERPL W/O P-5'-P-CCNC: 26 U/L
ANION GAP SERPL CALC-SCNC: 9 MMOL/L
APTT BLDCRRT: 23.3 SEC
AST SERPL-CCNC: 28 U/L
BASOPHILS # BLD AUTO: 0.01 K/UL
BASOPHILS # BLD AUTO: 0.01 K/UL
BASOPHILS # BLD AUTO: 0.02 K/UL
BASOPHILS NFR BLD: 0.1 %
BASOPHILS NFR BLD: 0.2 %
BASOPHILS NFR BLD: 0.2 %
BILIRUB SERPL-MCNC: 4.1 MG/DL
BUN SERPL-MCNC: 80 MG/DL
CALCIUM SERPL-MCNC: 8.5 MG/DL
CHLORIDE SERPL-SCNC: 97 MMOL/L
CO2 SERPL-SCNC: 27 MMOL/L
CREAT SERPL-MCNC: 1.9 MG/DL
DIFFERENTIAL METHOD: ABNORMAL
EOSINOPHIL # BLD AUTO: 0 K/UL
EOSINOPHIL # BLD AUTO: 0 K/UL
EOSINOPHIL # BLD AUTO: 0.1 K/UL
EOSINOPHIL NFR BLD: 0.2 %
EOSINOPHIL NFR BLD: 0.2 %
EOSINOPHIL NFR BLD: 0.7 %
ERYTHROCYTE [DISTWIDTH] IN BLOOD BY AUTOMATED COUNT: 18.3 %
ERYTHROCYTE [DISTWIDTH] IN BLOOD BY AUTOMATED COUNT: 18.6 %
ERYTHROCYTE [DISTWIDTH] IN BLOOD BY AUTOMATED COUNT: 18.9 %
EST. GFR  (AFRICAN AMERICAN): 30.6 ML/MIN/1.73 M^2
EST. GFR  (NON AFRICAN AMERICAN): 26.5 ML/MIN/1.73 M^2
FUNGUS SPEC CULT: NORMAL
FUNGUS SPEC CULT: NORMAL
GLUCOSE SERPL-MCNC: 118 MG/DL
HCT VFR BLD AUTO: 27.9 %
HCT VFR BLD AUTO: 28.7 %
HCT VFR BLD AUTO: 31 %
HGB BLD-MCNC: 10.5 G/DL
HGB BLD-MCNC: 9.2 G/DL
HGB BLD-MCNC: 9.4 G/DL
IMM GRANULOCYTES # BLD AUTO: 0.05 K/UL
IMM GRANULOCYTES # BLD AUTO: 0.06 K/UL
IMM GRANULOCYTES # BLD AUTO: 0.08 K/UL
IMM GRANULOCYTES NFR BLD AUTO: 0.6 %
IMM GRANULOCYTES NFR BLD AUTO: 0.9 %
IMM GRANULOCYTES NFR BLD AUTO: 0.9 %
INR PPP: 1
LYMPHOCYTES # BLD AUTO: 0.2 K/UL
LYMPHOCYTES # BLD AUTO: 0.2 K/UL
LYMPHOCYTES # BLD AUTO: 0.5 K/UL
LYMPHOCYTES NFR BLD: 2.5 %
LYMPHOCYTES NFR BLD: 2.7 %
LYMPHOCYTES NFR BLD: 5.6 %
MAGNESIUM SERPL-MCNC: 1.5 MG/DL
MCH RBC QN AUTO: 29.1 PG
MCH RBC QN AUTO: 29.3 PG
MCH RBC QN AUTO: 29.7 PG
MCHC RBC AUTO-ENTMCNC: 32.8 G/DL
MCHC RBC AUTO-ENTMCNC: 33 G/DL
MCHC RBC AUTO-ENTMCNC: 33.9 G/DL
MCV RBC AUTO: 87 FL
MCV RBC AUTO: 89 FL
MCV RBC AUTO: 90 FL
MONOCYTES # BLD AUTO: 0.1 K/UL
MONOCYTES # BLD AUTO: 0.4 K/UL
MONOCYTES # BLD AUTO: 0.4 K/UL
MONOCYTES NFR BLD: 2.3 %
MONOCYTES NFR BLD: 3.9 %
MONOCYTES NFR BLD: 4.3 %
NEUTROPHILS # BLD AUTO: 5.3 K/UL
NEUTROPHILS # BLD AUTO: 8.3 K/UL
NEUTROPHILS # BLD AUTO: 9.1 K/UL
NEUTROPHILS NFR BLD: 88.4 %
NEUTROPHILS NFR BLD: 92.6 %
NEUTROPHILS NFR BLD: 93.7 %
NRBC BLD-RTO: 0 /100 WBC
PHOSPHATE SERPL-MCNC: 3 MG/DL
PLATELET # BLD AUTO: 50 K/UL
PLATELET # BLD AUTO: 56 K/UL
PLATELET # BLD AUTO: 56 K/UL
PMV BLD AUTO: 11.1 FL
PMV BLD AUTO: 11.5 FL
PMV BLD AUTO: 12.1 FL
POCT GLUCOSE: 114 MG/DL (ref 70–110)
POCT GLUCOSE: 196 MG/DL (ref 70–110)
POCT GLUCOSE: 231 MG/DL (ref 70–110)
POCT GLUCOSE: 276 MG/DL (ref 70–110)
POTASSIUM SERPL-SCNC: 4.8 MMOL/L
PROT SERPL-MCNC: 4.3 G/DL
PROTHROMBIN TIME: 10.5 SEC
RBC # BLD AUTO: 3.1 M/UL
RBC # BLD AUTO: 3.23 M/UL
RBC # BLD AUTO: 3.58 M/UL
SODIUM SERPL-SCNC: 133 MMOL/L
TACROLIMUS BLD-MCNC: 6.7 NG/ML
WBC # BLD AUTO: 5.63 K/UL
WBC # BLD AUTO: 9.36 K/UL
WBC # BLD AUTO: 9.78 K/UL

## 2018-10-23 PROCEDURE — 94664 DEMO&/EVAL PT USE INHALER: CPT

## 2018-10-23 PROCEDURE — P9047 ALBUMIN (HUMAN), 25%, 50ML: HCPCS | Mod: JG | Performed by: PHYSICIAN ASSISTANT

## 2018-10-23 PROCEDURE — 25000003 PHARM REV CODE 250: Performed by: NURSE PRACTITIONER

## 2018-10-23 PROCEDURE — 25000003 PHARM REV CODE 250: Performed by: PHYSICIAN ASSISTANT

## 2018-10-23 PROCEDURE — 83735 ASSAY OF MAGNESIUM: CPT

## 2018-10-23 PROCEDURE — 85025 COMPLETE CBC W/AUTO DIFF WBC: CPT | Mod: 91

## 2018-10-23 PROCEDURE — 84100 ASSAY OF PHOSPHORUS: CPT

## 2018-10-23 PROCEDURE — 63600175 PHARM REV CODE 636 W HCPCS: Performed by: STUDENT IN AN ORGANIZED HEALTH CARE EDUCATION/TRAINING PROGRAM

## 2018-10-23 PROCEDURE — 85610 PROTHROMBIN TIME: CPT

## 2018-10-23 PROCEDURE — 63600175 PHARM REV CODE 636 W HCPCS: Mod: JG | Performed by: PHYSICIAN ASSISTANT

## 2018-10-23 PROCEDURE — 25000003 PHARM REV CODE 250: Performed by: STUDENT IN AN ORGANIZED HEALTH CARE EDUCATION/TRAINING PROGRAM

## 2018-10-23 PROCEDURE — 99900035 HC TECH TIME PER 15 MIN (STAT)

## 2018-10-23 PROCEDURE — 85730 THROMBOPLASTIN TIME PARTIAL: CPT

## 2018-10-23 PROCEDURE — 99233 SBSQ HOSP IP/OBS HIGH 50: CPT | Mod: 24,,, | Performed by: PHYSICIAN ASSISTANT

## 2018-10-23 PROCEDURE — 94760 N-INVAS EAR/PLS OXIMETRY 1: CPT

## 2018-10-23 PROCEDURE — 80197 ASSAY OF TACROLIMUS: CPT

## 2018-10-23 PROCEDURE — 63600175 PHARM REV CODE 636 W HCPCS: Performed by: PHYSICIAN ASSISTANT

## 2018-10-23 PROCEDURE — 99232 SBSQ HOSP IP/OBS MODERATE 35: CPT | Mod: ,,, | Performed by: NURSE PRACTITIONER

## 2018-10-23 PROCEDURE — 20600001 HC STEP DOWN PRIVATE ROOM

## 2018-10-23 PROCEDURE — 80053 COMPREHEN METABOLIC PANEL: CPT

## 2018-10-23 PROCEDURE — 25000003 PHARM REV CODE 250: Performed by: TRANSPLANT SURGERY

## 2018-10-23 PROCEDURE — C9113 INJ PANTOPRAZOLE SODIUM, VIA: HCPCS | Performed by: PHYSICIAN ASSISTANT

## 2018-10-23 PROCEDURE — 97116 GAIT TRAINING THERAPY: CPT

## 2018-10-23 PROCEDURE — 99232 SBSQ HOSP IP/OBS MODERATE 35: CPT | Mod: ,,, | Performed by: INTERNAL MEDICINE

## 2018-10-23 PROCEDURE — 63600175 PHARM REV CODE 636 W HCPCS: Performed by: NURSE PRACTITIONER

## 2018-10-23 RX ORDER — INSULIN ASPART 100 [IU]/ML
12 INJECTION, SOLUTION INTRAVENOUS; SUBCUTANEOUS
Status: DISCONTINUED | OUTPATIENT
Start: 2018-10-23 | End: 2018-10-23

## 2018-10-23 RX ORDER — ALBUMIN HUMAN 250 G/1000ML
25 SOLUTION INTRAVENOUS ONCE
Status: COMPLETED | OUTPATIENT
Start: 2018-10-23 | End: 2018-10-23

## 2018-10-23 RX ORDER — LANOLIN ALCOHOL/MO/W.PET/CERES
800 CREAM (GRAM) TOPICAL 2 TIMES DAILY
Status: DISCONTINUED | OUTPATIENT
Start: 2018-10-23 | End: 2018-12-21 | Stop reason: HOSPADM

## 2018-10-23 RX ORDER — INSULIN ASPART 100 [IU]/ML
14 INJECTION, SOLUTION INTRAVENOUS; SUBCUTANEOUS
Status: DISCONTINUED | OUTPATIENT
Start: 2018-10-23 | End: 2018-10-24

## 2018-10-23 RX ORDER — TACROLIMUS 0.5 MG/1
0.5 CAPSULE ORAL EVERY MORNING
Status: DISCONTINUED | OUTPATIENT
Start: 2018-10-24 | End: 2018-10-27

## 2018-10-23 RX ADMIN — INSULIN ASPART 12 UNITS: 100 INJECTION, SOLUTION INTRAVENOUS; SUBCUTANEOUS at 01:10

## 2018-10-23 RX ADMIN — ERGOCALCIFEROL 50000 UNITS: 1.25 CAPSULE ORAL at 08:10

## 2018-10-23 RX ADMIN — ALBUMIN HUMAN 25 G: 0.25 SOLUTION INTRAVENOUS at 12:10

## 2018-10-23 RX ADMIN — AMOXICILLIN 500 MG: 500 CAPSULE ORAL at 08:10

## 2018-10-23 RX ADMIN — INSULIN ASPART 2 UNITS: 100 INJECTION, SOLUTION INTRAVENOUS; SUBCUTANEOUS at 05:10

## 2018-10-23 RX ADMIN — URSODIOL 300 MG: 300 CAPSULE ORAL at 08:10

## 2018-10-23 RX ADMIN — PANTOPRAZOLE SODIUM 40 MG: 40 INJECTION, POWDER, FOR SOLUTION INTRAVENOUS at 08:10

## 2018-10-23 RX ADMIN — MAGNESIUM OXIDE TAB 400 MG (241.3 MG ELEMENTAL MG) 800 MG: 400 (241.3 MG) TAB at 08:10

## 2018-10-23 RX ADMIN — INSULIN ASPART 14 UNITS: 100 INJECTION, SOLUTION INTRAVENOUS; SUBCUTANEOUS at 05:10

## 2018-10-23 RX ADMIN — MAGNESIUM OXIDE TAB 400 MG (241.3 MG ELEMENTAL MG) 400 MG: 400 (241.3 MG) TAB at 08:10

## 2018-10-23 RX ADMIN — SODIUM BICARBONATE 650 MG TABLET 1300 MG: at 08:10

## 2018-10-23 RX ADMIN — FUROSEMIDE 80 MG: 10 INJECTION, SOLUTION INTRAMUSCULAR; INTRAVENOUS at 08:10

## 2018-10-23 RX ADMIN — FLUCONAZOLE 200 MG: 200 TABLET ORAL at 08:10

## 2018-10-23 RX ADMIN — LEVOFLOXACIN 250 MG: 250 TABLET, FILM COATED ORAL at 08:10

## 2018-10-23 RX ADMIN — MYCOPHENOLATE MOFETIL 1000 MG: 250 CAPSULE ORAL at 08:10

## 2018-10-23 RX ADMIN — MIRTAZAPINE 7.5 MG: 7.5 TABLET ORAL at 08:10

## 2018-10-23 RX ADMIN — PREDNISONE 15 MG: 5 TABLET ORAL at 08:10

## 2018-10-23 RX ADMIN — MIDODRINE HYDROCHLORIDE 15 MG: 5 TABLET ORAL at 04:10

## 2018-10-23 RX ADMIN — MIDODRINE HYDROCHLORIDE 15 MG: 5 TABLET ORAL at 08:10

## 2018-10-23 RX ADMIN — OXYCODONE HYDROCHLORIDE 5 MG: 5 TABLET ORAL at 08:10

## 2018-10-23 RX ADMIN — TACROLIMUS 0.5 MG: 0.5 CAPSULE ORAL at 07:10

## 2018-10-23 RX ADMIN — INSULIN ASPART 3 UNITS: 100 INJECTION, SOLUTION INTRAVENOUS; SUBCUTANEOUS at 01:10

## 2018-10-23 RX ADMIN — INSULIN ASPART 12 UNITS: 100 INJECTION, SOLUTION INTRAVENOUS; SUBCUTANEOUS at 08:10

## 2018-10-23 NOTE — PT/OT/SLP PROGRESS
Occupational Therapy      Patient Name:  Scarlett Reddy   MRN:  [id number]    Writing therapist attempted OT visit 2x this date. Patient not seen today secondary to working with physical therapy in AM and eating lunch while seated EOB during attempt in PM. Will follow-up as scheduled.    Laura corado OT  10/23/2018

## 2018-10-23 NOTE — PROGRESS NOTES
[facility]  Nephrology  Progress Note    Patient Name: Scarlett Reddy  MRN: [id number]  Admission Date: 10/1/2018  Hospital Length of Stay: 21 days  Attending Provider: Jani Theodore MD   Primary Care Physician: Primary Doctor No  Principal Problem:Liver transplanted    Subjective:     HPI: 67 y/o female with ESLD due to Hep B and D cirrhosis MELD of 23 on 10/01/18.  Paracentesis 10/1 with 5L removed, no fluid sent for analysis. 10/02 significant for hyponatremia, Na 119 and she was admitted to LTS for sodium monitoring. On 10/05/18 she is s/p DBDLT (CMV D+/R+, steroid induction, MMF/tacro/pred maintenance) with take back on 10/6 secondary to hyperbilirubinema, no leak identified. Post-op course complicated by hypercapneic and hypoxic respiratory failure and was intubated 10/06 and extubated 10/07. Liver bx (10/6) sig for Zone 3 hemorrhage and collapse, in addition to cholestasis. ID consulted to comment on positive diphtheroid culture from ascitic fluid (likely skin colonization) as well as ESBL Klebsiella pneumoniae in urine culture (10/4).  Pt asymptomatic and cell count from ascitic fluid unremarkable. Mild peritransplant ascites- repeat US 10/6 with increased arterial resistive indices. Repeat US 10/9 w/ continued elevation in RIs and fluid collections. LFTs trending down nicely.     Patient is currently on entecavir 0.5 mg, Hepagam, mycophenalate 1000 mg BID, prednisone 20 mg and prophylaxis with Bactrim, fluconazole and Valgancyclovir.    Nephrology consulted for acute kidney failure is setting of Orthotopic Liver Transplant. Cr 0.8 on 10/05, started to trend up and 1.5 (10/09), 1.8 (10/10), and 1.9 (10/11). UOP 10/10/18: 520 ml. UA (10/09): 1+ occult blood and 1+ protein with RBC and amorphous casts.        Interval History: No acute events overnight. She had 1 u PRBC transfused yesterday with good response in H/H, she states that she feels much better today and that her edema is much  improved. Creatinine elevated today 1.9 frin 1.5 Bun elevated to 80 from 67 . Last Sled was 10/20 with no improvement in kidney function     Review of patient's allergies indicates:  No Known Allergies  Current Facility-Administered Medications   Medication Frequency    0.9%  NaCl infusion (for blood administration) Q24H PRN    amoxicillin capsule 500 mg Q12H    bisacodyl EC tablet 10 mg Daily PRN    bisacodyl suppository 10 mg Daily PRN    calcium carbonate 200 mg calcium (500 mg) chewable tablet 500 mg TID PRN    dextrose 50% injection 12.5 g PRN    dextrose 50% injection 25 g PRN    docusate sodium capsule 100 mg TID PRN    entecavir tablet 0.5 mg Q72H    ergocalciferol capsule 50,000 Units Q7 Days    fluconazole tablet 200 mg Daily    furosemide injection 80 mg BID    glucagon (human recombinant) injection 1 mg PRN    glucose chewable tablet 16 g PRN    glucose chewable tablet 24 g PRN    insulin aspart U-100 pen 0-5 Units QID (AC + HS) PRN    insulin aspart U-100 pen 12 Units TIDWM    insulin detemir U-100 pen 3 Units QHS    insulin detemir U-100 pen 4 Units Daily    levoFLOXacin tablet 250 mg Daily    magnesium oxide tablet 400 mg BID    midodrine tablet 15 mg TID    mirtazapine tablet 7.5 mg QHS    mycophenolate capsule 1,000 mg BID    ondansetron disintegrating tablet 8 mg Q8H PRN    ondansetron injection 4 mg Q8H PRN    oxyCODONE immediate release tablet 5 mg Q4H PRN    oxyCODONE immediate release tablet Tab 10 mg Q4H PRN    pantoprazole injection 40 mg BID    polyethylene glycol packet 17 g Daily PRN    predniSONE tablet 15 mg Daily    Followed by    [START ON 10/24/2018] predniSONE tablet 10 mg Daily    Followed by    [START ON 10/31/2018] predniSONE tablet 5 mg Daily    Followed by    [START ON 11/7/2018] predniSONE tablet 2.5 mg Daily    promethazine (PHENERGAN) 6.25 mg in dextrose 5 % 50 mL IVPB Q6H PRN    sodium bicarbonate tablet 1,300 mg BID    sodium chloride  0.9% flush 3 mL PRN    sulfamethoxazole-trimethoprim 400-80mg per tablet 1 tablet Every Mon, Wed, Fri    tacrolimus capsule 0.5 mg BID    ursodiol capsule 300 mg BID    valGANciclovir tablet 450 mg Twice Weekly       Objective:     Vital Signs (Most Recent):  Temp: 97.7 °F (36.5 °C) (10/23/18 0740)  Pulse: 91 (10/23/18 0740)  Resp: (!) 9 (10/23/18 0740)  BP: 112/71 (10/23/18 0740)  SpO2: 99 % (10/23/18 0740)  O2 Device (Oxygen Therapy): room air (10/22/18 2000) Vital Signs (24h Range):  Temp:  [95.3 °F (35.2 °C)-97.8 °F (36.6 °C)] 97.7 °F (36.5 °C)  Pulse:  [] 91  Resp:  [9-21] 9  SpO2:  [96 %-100 %] 99 %  BP: ()/(63-79) 112/71     Weight: 72.2 kg (159 lb 3.2 oz) (10/22/18 0500)  Body mass index is 28.2 kg/m².  Body surface area is 1.79 meters squared.    I/O last 3 completed shifts:  In: 900 [P.O.:900]  Out: 4870 [Urine:1350; Other:3520]    Physical Exam   Constitutional: She is oriented to person, place, and time.   Temporal and distal extremity muscle wasting   Eyes: EOM are normal. Pupils are equal, round, and reactive to light.   Neck: Normal range of motion.   Cardiovascular: Normal rate and regular rhythm.   Pulmonary/Chest: Effort normal and breath sounds normal. No respiratory distress. She has no wheezes.   Abdominal: Soft. Bowel sounds are normal. She exhibits distension. There is tenderness. There is no guarding.   Musculoskeletal: Normal range of motion. She exhibits edema.   Neurological: She is alert and oriented to person, place, and time.   Skin: Skin is warm and dry. No rash noted.   Nursing note and vitals reviewed.      Significant Labs:  CBC:   Recent Labs   Lab 10/23/18  0600   WBC 9.36   RBC 3.58*   HGB 10.5*   HCT 31.0*   PLT 56*   MCV 87   MCH 29.3   MCHC 33.9     CMP:   Recent Labs   Lab 10/23/18  0600   *   CALCIUM 8.5*   ALBUMIN 2.7*   PROT 4.3*   *   K 4.8   CO2 27   CL 97   BUN 80*   CREATININE 1.9*   ALKPHOS 133   ALT 26   AST 28   BILITOT 4.1*      Coagulation:   Recent Labs   Lab 10/23/18  0600   INR 1.0   APTT 23.3     All labs within the past 24 hours have been reviewed.     Significant Imaging:  US: Reviewed    Assessment/Plan:      Post LT - Acute renal failure    Post-LT EVA is multifactorial in origin and has been related to the severity of liver disease, toxicity of immunosuppressive therapy and hepatic ischaemia reperfusion injury might be a driving force in the aetiology of post-LT EVA.      - acute gradual worsening of renal function s/p OLT  - 10/06 was taken to the OR for possibility of billiary leak and post op complication due to hypercapnic hypoxic respiratory failure, no blood pressure drop intraoperatively per anesthesiology notes. Patient has not been needing vasopressors to maintain BP  - Creatinine on arrival 0.8 and has been up trending   - BUN/cr (10/11/18): 91/1.9; (10/12/18): 100/2.0; (10/13/18): 110/2.4  - Tacrolimus 26.9 (10/08); 6.8 (10/12); 5.6 (10/13)  - Protein Creatine ratio: 0.82  - UOP: 900cc / 24 hrs  - UA consistent with hematuria, proteinuria, RBC and amorphous casts   - Fena 0.5%; consistent with hypovolemia   - Renal US negative for obstruction, stricture or, hydronephrosis    Assessment: Acute Renal failure in setting of hepatic ischemia reperfusion injury and poor perfusion status vs acute tubular necrosis from immunosuppressive agents      Plan:  -SLED for uremic toxin clearance was perfromed over the weekend for uremic platelet dysfunction. She remained to be anemic and still required PRBCs. Will continue to watch her kidney function but for now do not think that SLED provided any benefit    -  Remains to have UO adequate with current diuresis, avoid aggressive diuresis as she also has ostomy output yesterday . In the last 24 hours her output was 2100.   - Replete her magnesium as it remains low   -Modified renal diet : decrease in protein , normal intake of K and phos   - Can restart lasix 40 mg PO BID   - avoid  nephrotoxic drugs, ACEI/ARB, NSAIDS  - adjust drugs to GFR  - Agree with fluid restriction of 1500 cc/day  - Acute RRT not indicated at this point                                   Thank you for your consult. I will follow-up with patient. Please contact us if you have any additional questions.    Vaishali Ji MD  Nephrology  [facility]

## 2018-10-23 NOTE — ASSESSMENT & PLAN NOTE
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      - acute gradual worsening of renal function s/p OLT  - 10/06 was taken to the OR for possibility of billiary leak and post op complication due to hypercapnic hypoxic respiratory failure, no blood pressure drop intraoperatively per anesthesiology notes. Patient has not been needing vasopressors to maintain BP  - Creatinine on arrival 0.8 and has been up trending   - BUN/cr (10/11/18): 91/1.9; (10/12/18): 100/2.0; (10/13/18): 110/2.4  - Tacrolimus 26.9 (10/08); 6.8 (10/12); 5.6 (10/13)  - Protein Creatine ratio: 0.82  - UOP: 900cc / 24 hrs  - UA consistent with hematuria, proteinuria, RBC and amorphous casts   - Fena 0.5%; consistent with hypovolemia   - Renal US negative for obstruction, stricture or, hydronephrosis    Assessment: Acute Renal failure in setting of hepatic ischemia reperfusion injury and poor perfusion status vs acute tubular necrosis from immunosuppressive agents      Plan:  -SLED for uremic toxin clearance was perfromed over the weekend for uremic platelet dysfunction. She remained to be anemic and still required PRBCs. Will continue to watch her kidney function but for now do not think that SLED provided any benefit    -  Remains to have UO adequate with current diuresis, avoid aggressive diuresis as she also has ostomy output yesterday . In the last 24 hours her output was 2100.   - Replete her magnesium as it remains low   -Modified renal diet : decrease in protein , normal intake of K and phos   - Can restart lasix 40 mg PO BID   - avoid nephrotoxic drugs, ACEI/ARB, NSAIDS  - adjust drugs to GFR  - Agree with fluid restriction of 1500 cc/day  - Acute RRT not indicated at this point

## 2018-10-23 NOTE — ASSESSMENT & PLAN NOTE
- Diuresed w/ Lasix 100mg x 1 (10/12).  - CRRT 10/14.  .    -SLED 10/17, 10/20  -Diuresing w/ Lasix 80 BID-- stopped lasix today (10/23)  -Albumin x 1 given

## 2018-10-23 NOTE — SUBJECTIVE & OBJECTIVE
Interval History: No acute events overnight. She had 1 u PRBC transfused yesterday with good response in H/H, she states that she feels much better today and that her edema is much improved. Creatinine elevated today 1.9 frin 1.5 Bun elevated to 80 from 67 . Last Sled was 10/20 with no improvement in kidney function     Review of patient's allergies indicates:  No Known Allergies  Current Facility-Administered Medications   Medication Frequency    0.9%  NaCl infusion (for blood administration) Q24H PRN    amoxicillin capsule 500 mg Q12H    bisacodyl EC tablet 10 mg Daily PRN    bisacodyl suppository 10 mg Daily PRN    calcium carbonate 200 mg calcium (500 mg) chewable tablet 500 mg TID PRN    dextrose 50% injection 12.5 g PRN    dextrose 50% injection 25 g PRN    docusate sodium capsule 100 mg TID PRN    entecavir tablet 0.5 mg Q72H    ergocalciferol capsule 50,000 Units Q7 Days    fluconazole tablet 200 mg Daily    furosemide injection 80 mg BID    glucagon (human recombinant) injection 1 mg PRN    glucose chewable tablet 16 g PRN    glucose chewable tablet 24 g PRN    insulin aspart U-100 pen 0-5 Units QID (AC + HS) PRN    insulin aspart U-100 pen 12 Units TIDWM    insulin detemir U-100 pen 3 Units QHS    insulin detemir U-100 pen 4 Units Daily    levoFLOXacin tablet 250 mg Daily    magnesium oxide tablet 400 mg BID    midodrine tablet 15 mg TID    mirtazapine tablet 7.5 mg QHS    mycophenolate capsule 1,000 mg BID    ondansetron disintegrating tablet 8 mg Q8H PRN    ondansetron injection 4 mg Q8H PRN    oxyCODONE immediate release tablet 5 mg Q4H PRN    oxyCODONE immediate release tablet Tab 10 mg Q4H PRN    pantoprazole injection 40 mg BID    polyethylene glycol packet 17 g Daily PRN    predniSONE tablet 15 mg Daily    Followed by    [START ON 10/24/2018] predniSONE tablet 10 mg Daily    Followed by    [START ON 10/31/2018] predniSONE tablet 5 mg Daily    Followed by    [START  ON 11/7/2018] predniSONE tablet 2.5 mg Daily    promethazine (PHENERGAN) 6.25 mg in dextrose 5 % 50 mL IVPB Q6H PRN    sodium bicarbonate tablet 1,300 mg BID    sodium chloride 0.9% flush 3 mL PRN    sulfamethoxazole-trimethoprim 400-80mg per tablet 1 tablet Every Mon, Wed, Fri    tacrolimus capsule 0.5 mg BID    ursodiol capsule 300 mg BID    valGANciclovir tablet 450 mg Twice Weekly       Objective:     Vital Signs (Most Recent):  Temp: 97.7 °F (36.5 °C) (10/23/18 0740)  Pulse: 91 (10/23/18 0740)  Resp: (!) 9 (10/23/18 0740)  BP: 112/71 (10/23/18 0740)  SpO2: 99 % (10/23/18 0740)  O2 Device (Oxygen Therapy): room air (10/22/18 2000) Vital Signs (24h Range):  Temp:  [95.3 °F (35.2 °C)-97.8 °F (36.6 °C)] 97.7 °F (36.5 °C)  Pulse:  [] 91  Resp:  [9-21] 9  SpO2:  [96 %-100 %] 99 %  BP: ()/(63-79) 112/71     Weight: 72.2 kg (159 lb 3.2 oz) (10/22/18 0500)  Body mass index is 28.2 kg/m².  Body surface area is 1.79 meters squared.    I/O last 3 completed shifts:  In: 900 [P.O.:900]  Out: 4870 [Urine:1350; Other:3520]    Physical Exam   Constitutional: She is oriented to person, place, and time.   Temporal and distal extremity muscle wasting   Eyes: EOM are normal. Pupils are equal, round, and reactive to light.   Neck: Normal range of motion.   Cardiovascular: Normal rate and regular rhythm.   Pulmonary/Chest: Effort normal and breath sounds normal. No respiratory distress. She has no wheezes.   Abdominal: Soft. Bowel sounds are normal. She exhibits distension. There is tenderness. There is no guarding.   Musculoskeletal: Normal range of motion. She exhibits edema.   Neurological: She is alert and oriented to person, place, and time.   Skin: Skin is warm and dry. No rash noted.   Nursing note and vitals reviewed.      Significant Labs:  CBC:   Recent Labs   Lab 10/23/18  0600   WBC 9.36   RBC 3.58*   HGB 10.5*   HCT 31.0*   PLT 56*   MCV 87   MCH 29.3   MCHC 33.9     CMP:   Recent Labs   Lab  10/23/18  0600   *   CALCIUM 8.5*   ALBUMIN 2.7*   PROT 4.3*   *   K 4.8   CO2 27   CL 97   BUN 80*   CREATININE 1.9*   ALKPHOS 133   ALT 26   AST 28   BILITOT 4.1*     Coagulation:   Recent Labs   Lab 10/23/18  0600   INR 1.0   APTT 23.3     All labs within the past 24 hours have been reviewed.     Significant Imaging:  US: Reviewed

## 2018-10-23 NOTE — ASSESSMENT & PLAN NOTE
- Fluctuating H/H.    -Transfuse 10/14 and 10/16 (1 unit); 10/18 (2 units); 10/19 (1 unit), 10/22 (1 unit)  -Decreased platelets -- 1 u plt given 10/18   -DDAVP given 10/18 and 10/19  -FOBT +  -LDH elevated, Hapto < 10  -EGD 10/18: ulcerated duodenal mucosa   Monitoring w/ CBC q12hr to assess the need for transfusion

## 2018-10-23 NOTE — PROGRESS NOTES
"[facility]  Endocrinology  Progress Note    Admit Date: 10/1/2018     Reason for Consult: Management of type 2 DM, Hyperglycemia      Surgical Procedure and Date: Liver transplant 10/5/18, Ex lap 10/6/18     Diabetes diagnosis year:      Home Diabetes Medications:  Lantus 18 units HS and novolog 17 units with meals plus correction scale (150-200 +1)        Lab Results   Component Value Date     HGBA1C 6.8 (H) 2018        How often checking glucose at home? 3-4   BG readings on regimen: 120-150.  Post prandial readings as high as upper 200s  Hypoglycemia on the regimen? yes, none recently   Missed doses on regimen?  No     Diabetes Complications include:     Diabetic peripheral neuropathy      Complicating diabetes co morbidities:   CIRRHOSIS        HPI:  Patient is a 68 y.o. female with a diagnosis of ESLD, listed for liver transplant with meld 23 who underwent live transplant on 10/5/18.  Back to OR on 10/6/18 for ex lap.  Admitted 10/1/18 for hyponatremia s/p paracentesis. She speaks primarily Armenian. Information obtained from recent past admission and discussion over the phone via  ([employer]). Family not at bedside. Pt request family at bedside for future conversations. Personal has known diagnosis of diabetes, endocrine consulted for diabetes management.        Interval HPI:   Overnight events: remains in TSU. BG slightly above goal overnight; required correction scale. FBG below goal. Prednisone 15 mg daily;standard steroid taper.   Eatin% or more; hospital and outside food; tends to eat over long periods of time complicating both BG readings and insulin administration.   Nausea: No  Hypoglycemia and intervention: No  Fever: No  TPN and/or TF: No    BP (!) 98/54 (BP Location: Right arm, Patient Position: Lying)   Pulse (!) 132   Temp 97.4 °F (36.3 °C) (Oral)   Resp (!) 21   Ht 5' 3" (1.6 m)   Wt 72.2 kg (159 lb 3.2 oz)   LMP  (LMP Unknown)   SpO2 96%   " Breastfeeding? No   BMI 28.20 kg/m²      Labs Reviewed and Include    Recent Labs   Lab 10/22/18  0600   *   CALCIUM 8.4*   ALBUMIN 2.6*   PROT 3.7*   *   K 3.9   CO2 24   CL 97   BUN 67*   CREATININE 1.5*   ALKPHOS 86   ALT 22   AST 21   BILITOT 3.3*     Lab Results   Component Value Date    WBC 4.86 10/22/2018    WBC 4.86 10/22/2018    HGB 7.2 (L) 10/22/2018    HGB 7.2 (L) 10/22/2018    HCT 22.1 (L) 10/22/2018    HCT 22.1 (L) 10/22/2018    MCV 91 10/22/2018    MCV 91 10/22/2018    PLT 37 (LL) 10/22/2018    PLT 37 (LL) 10/22/2018     No results for input(s): TSH, FREET4 in the last 168 hours.  Lab Results   Component Value Date    HGBA1C 6.0 (H) 10/04/2018       Nutritional status:   Body mass index is 28.2 kg/m².  Lab Results   Component Value Date    ALBUMIN 2.6 (L) 10/22/2018    ALBUMIN 3.0 (L) 10/21/2018    ALBUMIN 3.0 (L) 10/21/2018    ALBUMIN 3.0 (L) 10/21/2018     Lab Results   Component Value Date    PREALBUMIN 12 (L) 09/17/2018       Estimated Creatinine Clearance: 33.7 mL/min (A) (based on SCr of 1.5 mg/dL (H)).    Accu-Checks  Recent Labs     10/20/18  0020 10/20/18  0827 10/20/18  1502 10/20/18  2001 10/21/18  0200 10/21/18  0904 10/21/18  1338 10/21/18  1810 10/22/18  0147 10/22/18  0822   POCTGLUCOSE 259* 211* 224* 273* 195* 178* 227* 224* 143* 147*       Current Medications and/or Treatments Impacting Glycemic Control  Immunotherapy:    Immunosuppressants         Stop Route Frequency     tacrolimus capsule 1 mg      -- Oral 2 times daily     mycophenolate capsule 1,000 mg      -- Oral 2 times daily        Steroids:   Hormones (From admission, onward)    Start     Stop Route Frequency Ordered    11/07/18 0900  predniSONE tablet 2.5 mg      11/14 0859 Oral Daily 10/17/18 0943    10/31/18 0900  predniSONE tablet 5 mg      11/07 0859 Oral Daily 10/17/18 0943    10/24/18 0900  predniSONE tablet 10 mg      10/31 0859 Oral Daily 10/17/18 0943    10/17/18 0945  predniSONE tablet 15 mg       10/24 0859 Oral Daily 10/17/18 0943        Pressors:    Autonomic Drugs (From admission, onward)    Start     Stop Route Frequency Ordered    10/10/18 1500  midodrine tablet 15 mg      -- Oral 3 times daily 10/10/18 1122    10/06/18 1617  rocuronium (ZEMURON) 10 mg/mL injection     Comments:  Created by cabinet override    10/07 0429   10/06/18 1617        Hyperglycemia/Diabetes Medications:   Antihyperglycemics (From admission, onward)    Start     Stop Route Frequency Ordered    10/21/18 0730  insulin aspart U-100 pen 14 Units      -- SubQ 3 times daily with meals 10/21/18 0716    10/20/18 2100  insulin detemir U-100 pen 4 Units      -- SubQ Nightly 10/20/18 0735    10/20/18 0900  insulin detemir U-100 pen 5 Units      -- SubQ Daily 10/20/18 0735    10/14/18 0925  insulin aspart U-100 pen 0-5 Units      -- SubQ Before meals & nightly PRN 10/14/18 0825    10/07/18 1200  insulin regular (Humulin R) 100 Units in sodium chloride 0.9% 100 mL infusion      10/13 2100 IV Continuous 10/07/18 1055          ASSESSMENT and PLAN    * Liver transplanted    Managed per primary team  Avoid hypoglycemia    Recent Labs   Lab 10/23/18  0600   ALT 26   AST 28   ALKPHOS 133   BILITOT 4.1*   PROT 4.3*   ALBUMIN 2.7*            Type 2 diabetes mellitus with diabetic polyneuropathy, with long-term current use of insulin    BG goal 140 - 180     Decrease Levemir 4 units in AM and 3 units in PM  Novolog to 12 units with breakfast. Will re-evaluate at lunch.  Low dose correction scale insulin  BG monitoring AC/HS    ADDENDUM: resume novolog 14 units with meals.       Discharge planning:  TBD     Corticosteroids adverse reaction    On standard steroid taper per transplant team; may elevate BG readings         EVA (acute kidney injury)    Avoid insulin stacking and hypoglycemia.  Lab Results   Component Value Date    CREATININE 1.9 (H) 10/23/2018            Prophylactic immunotherapy    May increase insulin resistance.        Severe  malnutrition    Oral intake encouraged, may affect BG readings         Aviva Caldera NP  Endocrinology  [facility]

## 2018-10-23 NOTE — PT/OT/SLP PROGRESS
Physical Therapy Treatment    Patient Name:  Scarlett Reddy   MRN:  [id number]    Recommendations:     Discharge Recommendations:  home health PT, home health OT   Discharge Equipment Recommendations: walker, rolling   Barriers to discharge: Decreased caregiver support at current functional level     Assessment:     Scarlett Reddy is a 68 y.o. female admitted with a medical diagnosis of Liver transplanted.  She presents with the following impairments/functional limitations:  weakness, gait instability, impaired endurance, impaired balance, decreased lower extremity function, impaired self care skills, impaired functional mobilty, edema, impaired cardiopulmonary response to activity. Decreased activity tolerance noted this session. Pt requiring increased assist to complete sit>stand transfer and ambulation distance limited compared to previous sessions. Pt reporting difficulty with gait this session 2* increased edema BLE. Pt would continue to benefit from skilled acute PT in order to address current deficits and progress functional mobility.     Rehab Prognosis:  good; patient would benefit from acute skilled PT services to address these deficits and reach maximum level of function.      Recent Surgery: Procedure(s) (LRB):  EGD (ESOPHAGOGASTRODUODENOSCOPY) (N/A) 5 Days Post-Op    Plan:     During this hospitalization, patient to be seen 4 x/week to address the above listed problems via gait training, therapeutic activities, therapeutic exercises, neuromuscular re-education  · Plan of Care Expires:  11/05/18   Plan of Care Reviewed with: patient    Subjective     Communicated with RN prior to session.  Patient found UIC upon PT entry to room, agreeable to treatment.     present throughout session    Chief Complaint: BLE edema  Patient comments/goals: When asked if walking felt more difficult today, pt nodding her head 'yes'.  Pain/Comfort:  · Pain Rating 1: 0/10    Patients cultural, spiritual, Restorationism  conflicts given the current situation: none noted     Objective:     Patient found with: telemetry, pulse ox (continuous)     General Precautions: Standard, fall, contact   Orthopedic Precautions:N/A   Braces: N/A     Functional Mobility:  · Transfers:     · Sit to Stand:  moderate assistance with rolling walker   · With cues for BUE placement and transfer technique with RW   · Gait: ~50 ft. with RW and CGA  · Decreased step length, decreased giuseppe, impaired weight-shifting ability, decreased heel strike, decreased toe-floor clearance, increased forward lean   · Multiple standing rest breaks throughout  · HR to 140s during gait, decreased to 120s once returned to seated position. RN aware and monitoring..       AM-PAC 6 CLICK MOBILITY  Turning over in bed (including adjusting bedclothes, sheets and blankets)?: 3  Sitting down on and standing up from a chair with arms (e.g., wheelchair, bedside commode, etc.): 2  Moving from lying on back to sitting on the side of the bed?: 3  Moving to and from a bed to a chair (including a wheelchair)?: 3  Need to walk in hospital room?: 3  Climbing 3-5 steps with a railing?: 2  Basic Mobility Total Score: 16       Therapeutic Activities and Exercises:   Pt educated on seated therex to perform (I) daily: AP and marches. Pt v/u.     Patient left up in chair with all lines intact, call button in reach and  and pt's  present..    GOALS:   Multidisciplinary Problems     Physical Therapy Goals        Problem: Physical Therapy Goal    Goal Priority Disciplines Outcome Goal Variances Interventions   Physical Therapy Goal     PT, PT/OT Ongoing (interventions implemented as appropriate)     Description:  Goals to be met by: 2018    Patient will increase functional independence with mobility by performin. Supine to sit with Contact Guard Assistance - not met  2. Sit to stand transfer with Supervision using LRAD or no AD - not met  3. Bed to chair transfer with  Supervision using LRAD or no AD - not met  4. Gait  x 250 feet with Supervision using LRAD or no AD - not met  5. Stand for 3 minutes with Supervision to increase activity tolerance - not met   6. Lower extremity exercise program x15 reps per handout, with independence - not met                        Time Tracking:     PT Received On: 10/23/18  PT Start Time: 1039     PT Stop Time: 1054  PT Total Time (min): 15 min     Billable Minutes: Gait Training 15    Treatment Type: Treatment  PT/PTA: PT     PTA Visit Number: 0     Radha Nguyen PT, DPT   10/23/2018

## 2018-10-23 NOTE — PROGRESS NOTES
Pt aaox3.  Bed in low and locked position. Nonskid socks in use.  Call bell, phone, food, drink, walker within reach of pt.  Daughter and or  at bedside throughout shift.  Israeli/Spanish speaking only - interpretor and or google  used to aid in communication.  Denies pain.  Up in chair for most of day.  Liver US done this am.  Old VALORIE site drained 2L with ostomy bag prior to MD stitching at bedside.  No more leaking since.  Carmen contreras staples.  CBC changed to Q12hrs from 6hrs.  Unit of RBCs given this afternoon.  Accuchecks ac/hs.  Tele in use.  Isolation for ESBL in urine.  SABINA contreras PT CDI.  Ambulated with assist of walker.   See flowsheet for full assessment and details.

## 2018-10-23 NOTE — SUBJECTIVE & OBJECTIVE
Scheduled Meds:   albumin human 25%  25 g Intravenous Once    amoxicillin  500 mg Oral Q12H    entecavir  0.5 mg Oral Q72H    ergocalciferol  50,000 Units Oral Q7 Days    fluconazole  200 mg Oral Daily    insulin aspart U-100  12 Units Subcutaneous TIDWM    insulin detemir U-100  3 Units Subcutaneous QHS    insulin detemir U-100  4 Units Subcutaneous Daily    levoFLOXacin  250 mg Oral Daily    magnesium oxide  800 mg Oral BID    midodrine  15 mg Oral TID    mirtazapine  7.5 mg Oral QHS    mycophenolate  1,000 mg Oral BID    pantoprazole  40 mg Intravenous BID    predniSONE  15 mg Oral Daily    Followed by    [START ON 10/24/2018] predniSONE  10 mg Oral Daily    Followed by    [START ON 10/31/2018] predniSONE  5 mg Oral Daily    Followed by    [START ON 11/7/2018] predniSONE  2.5 mg Oral Daily    sulfamethoxazole-trimethoprim 400-80mg  1 tablet Oral Every Mon, Wed, Fri    tacrolimus  0.5 mg Oral BID    ursodiol  300 mg Oral BID    valGANciclovir  450 mg Oral Twice Weekly     Continuous Infusions:  PRN Meds:bisacodyl, bisacodyl, calcium carbonate, dextrose 50%, dextrose 50%, docusate sodium, glucagon (human recombinant), glucose, glucose, insulin aspart U-100, ondansetron, ondansetron, oxyCODONE, oxyCODONE, polyethylene glycol, promethazine (PHENERGAN) IVPB, sodium chloride 0.9%    Review of Systems   Constitutional: Positive for activity change, appetite change and fatigue. Negative for chills and fever.   Respiratory: Negative for cough and shortness of breath.    Cardiovascular: Positive for leg swelling. Negative for chest pain.   Gastrointestinal: Positive for abdominal distention and abdominal pain. Negative for constipation, diarrhea and nausea.   Genitourinary: Negative for decreased urine volume, difficulty urinating and dysuria.   Musculoskeletal: Negative for arthralgias and back pain.   Allergic/Immunologic: Positive for immunocompromised state.   Neurological: Positive for weakness.  Negative for dizziness and tremors.     Objective:     Vital Signs (Most Recent):  Temp: 97.7 °F (36.5 °C) (10/23/18 0740)  Pulse: 108 (10/23/18 1145)  Resp: 13 (10/23/18 1145)  BP: 111/69 (10/23/18 1145)  SpO2: 99 % (10/23/18 1145) Vital Signs (24h Range):  Temp:  [95.3 °F (35.2 °C)-97.8 °F (36.6 °C)] 97.7 °F (36.5 °C)  Pulse:  [] 108  Resp:  [9-21] 13  SpO2:  [96 %-100 %] 99 %  BP: ()/(63-79) 111/69     Weight: 72.2 kg (159 lb 3.2 oz)  Body mass index is 28.2 kg/m².    Intake/Output - Last 3 Shifts       10/21 0700 - 10/22 0659 10/22 0700 - 10/23 0659 10/23 0700 - 10/24 0659    P.O. 720 900     Total Intake(mL/kg) 720 (10) 900 (12.5)     Urine (mL/kg/hr) 700 (0.4) 1000 (0.6)     Emesis/NG output 0      Other 1780 2000     Stool 0 0     Blood 0      Total Output 2480 3000     Net -1760 -2100            Urine Occurrence 1 x 2 x     Stool Occurrence 0 x 3 x     Emesis Occurrence 0 x            Physical Exam   Constitutional: She is oriented to person, place, and time.   Temporal and distal extremity muscle wasting   Eyes: EOM are normal. Pupils are equal, round, and reactive to light. Scleral icterus is present.   Neck: Normal range of motion.   Cardiovascular: Normal rate and regular rhythm.   No murmur heard.  Pulmonary/Chest: Effort normal and breath sounds normal. No respiratory distress. She has no wheezes.   Abdominal: Soft. Bowel sounds are normal. She exhibits distension. There is tenderness. There is no guarding.   Chevron incision w/ staples, CDI.  VALORIE sites w/ stitches, no leak or signs of infections   Musculoskeletal: Normal range of motion. She exhibits edema (3+ BLE).   Neurological: She is alert and oriented to person, place, and time.   Skin: Skin is warm and dry.   Nursing note and vitals reviewed.      Laboratory:  Immunosuppressants         Stop Route Frequency     tacrolimus capsule 0.5 mg      -- Oral 2 times daily     mycophenolate capsule 1,000 mg      -- Oral 2 times daily         CBC:   Recent Labs   Lab 10/23/18  0600   WBC 9.36   RBC 3.58*   HGB 10.5*   HCT 31.0*   PLT 56*   MCV 87   MCH 29.3   MCHC 33.9     CMP:   Recent Labs   Lab 10/23/18  0600   *   CALCIUM 8.5*   ALBUMIN 2.7*   PROT 4.3*   *   K 4.8   CO2 27   CL 97   BUN 80*   CREATININE 1.9*   ALKPHOS 133   ALT 26   AST 28   BILITOT 4.1*     Labs within the past 24 hours have been reviewed.    Diagnostic Results:  I have personally reviewed all pertinent imaging studies.

## 2018-10-23 NOTE — PROGRESS NOTES
Yon informed of HH of 9.2/27.9 from 10.5/31 this am.  No new orders at this time.  Next check 6am jose

## 2018-10-23 NOTE — ASSESSMENT & PLAN NOTE
Managed per primary team  Avoid hypoglycemia    Recent Labs   Lab 10/23/18  0600   ALT 26   AST 28   ALKPHOS 133   BILITOT 4.1*   PROT 4.3*   ALBUMIN 2.7*

## 2018-10-23 NOTE — PLAN OF CARE
Problem: Patient Care Overview  Goal: Plan of Care Review  Outcome: Ongoing (interventions implemented as appropriate)    Pt AAOx4 with Daughter, Gabriela, at the bedside.  Pt is jaundiced. Sclera yellow. 3+ edema to BLE. Abdomen distended.  Cr 1.9 yesterday. Lasix held and Albumin given x1..   Pt on contact iso for ESBL in urine.  R IJ TLC CDI.   H/H stable. CBCs Q12h. Last h&h 9.2 & 27.9.  Pt up with 1 assist ans walker.   VSS. Pt on tele- SR with HR 70's-90's. ST when up to bathroom.   Chevron incision JULIANE with staples CDI. Painted with betadine.  Accuchecks AC/HS. HS .   No acute changes over night.  Pt remained free from falls or injury thus far. Bed is in low/ locked position, side rails are up x 2, call light is in reach. Will continue to monitor.

## 2018-10-23 NOTE — PROGRESS NOTES
Pt aaox3.  Bed in low and locked position. Nonskid socks in use.  Call bell, phone, food, drink, walker within reach of pt.  Daughter and or  at bedside throughout shift.  [place]yan/Yoruba speaking only - interpretor and or google  used to aid in communication.  Denies pain.  Up in chair for most of day. wokred with PT/Ot in halls. Old VALORIE site with no more drainage since stitch placed yest.  Chevron dedrick w staples.  CBC Q12hrs.  Accuchecks ac/hs.  Tele in use.  Isolation for ESBL in urine.  SABINA contreras PT CDI.  Ambulated with assist of walker. IVP lasix and po sodium bicarb dc'd during md rounds.  Albumin x1 given.  Mag po replacement dose increased.    See flowsheet for full assessment and details.

## 2018-10-23 NOTE — ASSESSMENT & PLAN NOTE
- Creatinine on arrival 0.8 and trended up  -BUN/cr (10/11/18): 91/1.9   -Tacrolimus 26.9 (10/08)  Nephrology consulted  Renal US (10/11): no hydronephrosis  Echo 10/12: diastolic dysfunction, likely 2/2 to ARF  - pt on renal diet w 1500 ml FR.  -CRRT 10/14; SLED 10/17, 10/20  -Repeat US 10/22- decreased velocities. Will monitor

## 2018-10-23 NOTE — ASSESSMENT & PLAN NOTE
Avoid insulin stacking and hypoglycemia.  Lab Results   Component Value Date    CREATININE 1.9 (H) 10/23/2018

## 2018-10-23 NOTE — PROGRESS NOTES
[facility]  Liver Transplant  Progress Note    Patient Name: Scarlett Reddy  MRN: [id number]  Admission Date: 10/1/2018  Hospital Length of Stay: 21 days  Code Status: Full Code  Primary Care Provider: Primary Doctor No  Post-Operative Day: 18    ORGAN:   LIVER  Disease Etiology: Cirrhosis: Type B and D  Donor Type:    - Brain Death  CDC High Risk:   No  Donor CMV Status:   Donor CMV Status: Positive  Donor HBcAB:   Negative  Donor HCV Status:   Negative  Donor HBV SETH: Negative  Donor HCV SETH: Negative  Whole or Partial: Whole Liver  Biliary Anastomosis: End to End  Arterial Anatomy: Replaced Left Hepatic and Right Hepatic  Subjective:     History of Present Illness:  Ms. Reddy is a 67 y/o female with PMH of ESLD secondary Hep B and D.  Listed for liver transplant with MELD 23.  Paracentesis 10/1 with 5L removed, no fluid sent for analysis.  Morning labs significant for hyponatremia, Na 119.  Pt admitted to LTS for sodium monitoring.        Hospital Course:  Hospital Course: 67 y/o F h/o HBV/delta cirrhosis s/p DBDLT 10/5/18 (CMV D+/R+, steroid induction, MMF/tacro/pred maintenance) with take back on 10/6 2/2 hyperbilirubinema and bilious VALORIE drainage, no leak identified. Post-op course c/b hypercapneic and hypoxic respiratory failure and was reintubated though quickly extubated now doing well. Liver bx (10/6) sig for Zone 3 hemorrhage and collapse, in addition to cholestasis. Transferred from ICU on POD #4. ID consulted to comment on positive diphtheroid culture from ascitic fluid (likely skin colonization) as well as ESBL Klebsiella pneumoniae in urine culture (10/4).  Pt asymptomatic and cell count from ascitic fluid unremarkable. Per ID recs, no need to treat diphtheroids or asymptomatic ESBL Kleb pneumo bacteriuria though pt has had 6 days of therapy which would cover these organisms. Mild peritransplant ascites- repeat US 10/6 with increased arterial resistive indices. Repeat US 10/9  w/ continued elevation in RIs and fluid collections. LFTs trending down nicely.     Acute gradual worsening of renal function s/p OLT.Creatinine on arrival 0.8 and has been up trending. Nephrology consulted for post-op EVA. Remains on renal/low K diet. Patient continued to be diuresed with lasix. BUN and Cr remain elevated requiring multiple rounds of dialysis (10/15, 10/17, 10/20). Additionally, pt w/ anemia requiring multiple blood transfusions (10/14, 10/16, 10/18, 10/19, 10/22). Will cont close monitoring H/H, transfuse as needed with goal Hb > 7.0. Decreased platelets, improvements w/ 2 u plts (10/18, 10/19) and DDAVP.   Pt reported dark stools 10/15 but color has normalized. No blood noted on MORELIA. FOBT+. EGD 10/18 consistent with ulcerated mucosa in duodenum s/p coagulation. Biopsy pending. Concern for H. Pylori. Started Amoxicillin/levofloxacin (10/19). Remains on Protonix 40 mg BID.   Of note, Urine cx + Klebsiella pneumoniae (10/13). Received 3 day course of ciprofloxacin, dc'd 10/19.   On 10/19 pm pt c/o crushing chest pain- cardiac workup initiated. Troponin 0.094, likely 2/2 ischemic stress. EKG NSR.     Interval history: Pt doing well this morning. Remains very tender across abdomen, likely 2/2 excess fluid. Tolerated blood transfusion yesterday. H/H stable. US 10/22 w/ decreased velocities. Will cont to monitor. Cr trending up (1.9). Holding lasix today. Giving Albumin x 1. Pt remains off heparin due to GI bleed. Pt to wear compression stockings at this time. No further leaking from VALORIE site. Pt eating well. Placed on renal/low K and low fat diet. No further complaints at this time.    Scheduled Meds:   albumin human 25%  25 g Intravenous Once    amoxicillin  500 mg Oral Q12H    entecavir  0.5 mg Oral Q72H    ergocalciferol  50,000 Units Oral Q7 Days    fluconazole  200 mg Oral Daily    insulin aspart U-100  12 Units Subcutaneous TIDWM    insulin detemir U-100  3 Units Subcutaneous QHS     insulin detemir U-100  4 Units Subcutaneous Daily    levoFLOXacin  250 mg Oral Daily    magnesium oxide  800 mg Oral BID    midodrine  15 mg Oral TID    mirtazapine  7.5 mg Oral QHS    mycophenolate  1,000 mg Oral BID    pantoprazole  40 mg Intravenous BID    predniSONE  15 mg Oral Daily    Followed by    [START ON 10/24/2018] predniSONE  10 mg Oral Daily    Followed by    [START ON 10/31/2018] predniSONE  5 mg Oral Daily    Followed by    [START ON 11/7/2018] predniSONE  2.5 mg Oral Daily    sulfamethoxazole-trimethoprim 400-80mg  1 tablet Oral Every Mon, Wed, Fri    tacrolimus  0.5 mg Oral BID    ursodiol  300 mg Oral BID    valGANciclovir  450 mg Oral Twice Weekly     Continuous Infusions:  PRN Meds:bisacodyl, bisacodyl, calcium carbonate, dextrose 50%, dextrose 50%, docusate sodium, glucagon (human recombinant), glucose, glucose, insulin aspart U-100, ondansetron, ondansetron, oxyCODONE, oxyCODONE, polyethylene glycol, promethazine (PHENERGAN) IVPB, sodium chloride 0.9%    Review of Systems   Constitutional: Positive for activity change, appetite change and fatigue. Negative for chills and fever.   Respiratory: Negative for cough and shortness of breath.    Cardiovascular: Positive for leg swelling. Negative for chest pain.   Gastrointestinal: Positive for abdominal distention and abdominal pain. Negative for constipation, diarrhea and nausea.   Genitourinary: Negative for decreased urine volume, difficulty urinating and dysuria.   Musculoskeletal: Negative for arthralgias and back pain.   Allergic/Immunologic: Positive for immunocompromised state.   Neurological: Positive for weakness. Negative for dizziness and tremors.     Objective:     Vital Signs (Most Recent):  Temp: 97.7 °F (36.5 °C) (10/23/18 0740)  Pulse: 108 (10/23/18 1145)  Resp: 13 (10/23/18 1145)  BP: 111/69 (10/23/18 1145)  SpO2: 99 % (10/23/18 1145) Vital Signs (24h Range):  Temp:  [95.3 °F (35.2 °C)-97.8 °F (36.6 °C)] 97.7 °F (36.5  °C)  Pulse:  [] 108  Resp:  [9-21] 13  SpO2:  [96 %-100 %] 99 %  BP: ()/(63-79) 111/69     Weight: 72.2 kg (159 lb 3.2 oz)  Body mass index is 28.2 kg/m².    Intake/Output - Last 3 Shifts       10/21 0700 - 10/22 0659 10/22 0700 - 10/23 0659 10/23 0700 - 10/24 0659    P.O. 720 900     Total Intake(mL/kg) 720 (10) 900 (12.5)     Urine (mL/kg/hr) 700 (0.4) 1000 (0.6)     Emesis/NG output 0      Other 1780 2000     Stool 0 0     Blood 0      Total Output 2480 3000     Net -1760 -2100            Urine Occurrence 1 x 2 x     Stool Occurrence 0 x 3 x     Emesis Occurrence 0 x            Physical Exam   Constitutional: She is oriented to person, place, and time.   Temporal and distal extremity muscle wasting   Eyes: EOM are normal. Pupils are equal, round, and reactive to light. Scleral icterus is present.   Neck: Normal range of motion.   Cardiovascular: Normal rate and regular rhythm.   No murmur heard.  Pulmonary/Chest: Effort normal and breath sounds normal. No respiratory distress. She has no wheezes.   Abdominal: Soft. Bowel sounds are normal. She exhibits distension. There is tenderness. There is no guarding.   Chevron incision w/ staples, CDI.  VALORIE sites w/ stitches, no leak or signs of infections   Musculoskeletal: Normal range of motion. She exhibits edema (3+ BLE).   Neurological: She is alert and oriented to person, place, and time.   Skin: Skin is warm and dry.   Nursing note and vitals reviewed.      Laboratory:  Immunosuppressants         Stop Route Frequency     tacrolimus capsule 0.5 mg      -- Oral 2 times daily     mycophenolate capsule 1,000 mg      -- Oral 2 times daily        CBC:   Recent Labs   Lab 10/23/18  0600   WBC 9.36   RBC 3.58*   HGB 10.5*   HCT 31.0*   PLT 56*   MCV 87   MCH 29.3   MCHC 33.9     CMP:   Recent Labs   Lab 10/23/18  0600   *   CALCIUM 8.5*   ALBUMIN 2.7*   PROT 4.3*   *   K 4.8   CO2 27   CL 97   BUN 80*   CREATININE 1.9*   ALKPHOS 133   ALT 26   AST 28    BILITOT 4.1*     Labs within the past 24 hours have been reviewed.    Diagnostic Results:  I have personally reviewed all pertinent imaging studies.    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   -POD 1 US: increased arterial resistive indices, suggestive of edema vs cute rejection vs congestion  -Repeat US 10/9 with continued elevation of RIs  -LFTs trending down  -T bili trending down  -repeat liver US (10/11): elevated RIs  - LFTs normalizing            Duodenal ulcer    EGD 10/18- sig for ulcerated duodenal mucosa.   Biopsy pending  Cont protonix 40 BID  Started H. Pylori tx 10/19: Amoxicillin/Levo. Remains on PPI       UTI (urinary tract infection)    Urine Cx 10/15 w/ Klebsiella pneumonia  Started Ciprofloxacin 10/17. Dc'd 10/19. Coverage w/ h. pylori treatment  Pt asymptomatic at this time       Acute blood loss anemia    H/H cont to fluctuate.   FOBT +  EGD 10/18- showed diffuse bleeding with ulcerated duodenal mucosa   Monitoring w/ CBC q 12 to assess the need for transfusion            Post LT - Acute renal failure    - Creatinine on arrival 0.8 and trended up  -BUN/cr (10/11/18): 91/1.9   -Tacrolimus 26.9 (10/08)  Nephrology consulted  Renal US (10/11): no hydronephrosis  Echo 10/12: diastolic dysfunction, likely 2/2 to ARF  - pt on renal diet w 1500 ml FR.  -CRRT 10/14; SLED 10/17, 10/20  -Repeat US 10/22- decreased velocities. Will monitor      Hypervolemia associated with renal insufficiency    - Diuresed w/ Lasix 100mg x 1 (10/12).  - CRRT 10/14.  .    -SLED 10/17, 10/20  -Diuresing w/ Lasix 80 BID-- stopped lasix today (10/23)  -Albumin x 1 given        EVA (acute kidney injury)           Metabolic acidosis    - Started Na bicarb 1300 QID.  -CRRT 10/14  -SLED 10/17, 10/20         At risk for opportunistic infections     - Valcyte for CMV prophylaxis  - Bactrim for PCP prophylaxis (MWF)  - Fluconazole for fungal prophylaxis        Long-term use of immunosuppressant medication    -  Cellcept/Prograf/pred  -  Held prograf 10/8 for elevated levels  -  Resumed Prograf 10/10/19. Will adjust as needed.   -  Will monitor for signs of toxic side effects, check daily tacrolimus troughs, and change meds accordingly.       Prophylactic immunotherapy    - See long term use immunosuppresion       Ascites    - paracentesis 10/1, 5L removed, no fluid sent for analysis.  - diuresing with albumin and lasix 10/9.  - Per Nephrology, on 10/13- Lasix 80 mg bid and monitor.  No need for RRT.  -  10/14, pt w worsening edema.          Weakness    - see physical decond.       Physical deconditioning    - PT/OT consulted.  - Recommend home health PT and rolling walker at discharge        Hyponatremia    - Na 119 on admit.  -Na was improving post transplant w HD.  - Pt POD #9 w EVA  - Na stable at this time. Monitoring w/ daily labs            Hypotension    - continue Midodrine 15 mg TID.   Monitor.       Anemia requiring transfusions    - Fluctuating H/H.    -Transfuse 10/14 and 10/16 (1 unit); 10/18 (2 units); 10/19 (1 unit), 10/22 (1 unit)  -Decreased platelets -- 1 u plt given 10/18   -DDAVP given 10/18 and 10/19  -FOBT +  -LDH elevated, Hapto < 10  -EGD 10/18: ulcerated duodenal mucosa   Monitoring w/ CBC q12hr to assess the need for transfusion       Severe malnutrition    - dietary consulted.   - temporal and distal extremity muscle wasting and edema  - encourage supplements and to increase nutritional intake   - pt on renal/low K+ and low fat diet     Type 2 diabetes mellitus with diabetic polyneuropathy, with long-term current use of insulin    - continue home regimen.  - endocrine consulted.  -Pt off insulin drip at this time.  Apprec endo recs.        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  -Tx w/  Entecavir- dose changed to q72 hours given renal function.             VTE Risk Mitigation (From admission, onward)        Ordered     Place sequential compression device  Until discontinued      10/05/18 0709          The patients clinical status was discussed at multidisplinary rounds, involving transplant surgery, transplant medicine, pharmacy, nursing, nutrition, and social work    Discharge Planning:  No Patient Care Coordination Note on file.      Jihan Soares PA-C  Liver Transplant  [facility]

## 2018-10-23 NOTE — PLAN OF CARE
Problem: Physical Therapy Goal  Goal: Physical Therapy Goal  Goals to be met by: 2018    Patient will increase functional independence with mobility by performin. Supine to sit with Contact Guard Assistance - not met  2. Sit to stand transfer with Supervision using LRAD or no AD - not met  3. Bed to chair transfer with Supervision using LRAD or no AD - not met  4. Gait  x 250 feet with Supervision using LRAD or no AD - not met  5. Stand for 3 minutes with Supervision to increase activity tolerance - not met   6. Lower extremity exercise program x15 reps per handout, with independence - not met      Outcome: Ongoing (interventions implemented as appropriate)  Goals reviewed and remain appropriate. Pt progressing towards goals.    Radha Nguyen, PT, DPT   10/23/2018  [phone]

## 2018-10-23 NOTE — ASSESSMENT & PLAN NOTE
BG goal 140 - 180     Decrease Levemir 4 units in AM and 3 units in PM  Novolog to 12 units with breakfast. Will re-evaluate at lunch.  Low dose correction scale insulin  BG monitoring AC/HS    ADDENDUM: resume novolog 14 units with meals.       Discharge planning:  TBD

## 2018-10-24 LAB
ALBUMIN SERPL BCP-MCNC: 2.6 G/DL
ALP SERPL-CCNC: 110 U/L
ALT SERPL W/O P-5'-P-CCNC: 21 U/L
ANION GAP SERPL CALC-SCNC: 10 MMOL/L
APTT BLDCRRT: 22.1 SEC
AST SERPL-CCNC: 25 U/L
BASOPHILS # BLD AUTO: 0.01 K/UL
BASOPHILS # BLD AUTO: 0.01 K/UL
BASOPHILS NFR BLD: 0.2 %
BASOPHILS NFR BLD: 0.2 %
BILIRUB SERPL-MCNC: 2.8 MG/DL
BUN SERPL-MCNC: 87 MG/DL
CALCIUM SERPL-MCNC: 8.4 MG/DL
CHLORIDE SERPL-SCNC: 98 MMOL/L
CO2 SERPL-SCNC: 26 MMOL/L
CREAT SERPL-MCNC: 2.1 MG/DL
DIFFERENTIAL METHOD: ABNORMAL
DIFFERENTIAL METHOD: ABNORMAL
EOSINOPHIL # BLD AUTO: 0 K/UL
EOSINOPHIL # BLD AUTO: 0.1 K/UL
EOSINOPHIL NFR BLD: 0.2 %
EOSINOPHIL NFR BLD: 1 %
ERYTHROCYTE [DISTWIDTH] IN BLOOD BY AUTOMATED COUNT: 19.1 %
ERYTHROCYTE [DISTWIDTH] IN BLOOD BY AUTOMATED COUNT: 19.5 %
EST. GFR  (AFRICAN AMERICAN): 27.1 ML/MIN/1.73 M^2
EST. GFR  (NON AFRICAN AMERICAN): 23.5 ML/MIN/1.73 M^2
GLUCOSE SERPL-MCNC: 76 MG/DL
HCT VFR BLD AUTO: 25.2 %
HCT VFR BLD AUTO: 26.4 %
HGB BLD-MCNC: 8.5 G/DL
HGB BLD-MCNC: 8.5 G/DL
IMM GRANULOCYTES # BLD AUTO: 0.06 K/UL
IMM GRANULOCYTES # BLD AUTO: 0.07 K/UL
IMM GRANULOCYTES NFR BLD AUTO: 1 %
IMM GRANULOCYTES NFR BLD AUTO: 1.3 %
INR PPP: 1
LYMPHOCYTES # BLD AUTO: 0.2 K/UL
LYMPHOCYTES # BLD AUTO: 0.4 K/UL
LYMPHOCYTES NFR BLD: 2.9 %
LYMPHOCYTES NFR BLD: 6.1 %
MAGNESIUM SERPL-MCNC: 1.6 MG/DL
MCH RBC QN AUTO: 29.1 PG
MCH RBC QN AUTO: 30.1 PG
MCHC RBC AUTO-ENTMCNC: 32.2 G/DL
MCHC RBC AUTO-ENTMCNC: 33.7 G/DL
MCV RBC AUTO: 89 FL
MCV RBC AUTO: 90 FL
MONOCYTES # BLD AUTO: 0.2 K/UL
MONOCYTES # BLD AUTO: 0.3 K/UL
MONOCYTES NFR BLD: 3.6 %
MONOCYTES NFR BLD: 5 %
NEUTROPHILS # BLD AUTO: 5.1 K/UL
NEUTROPHILS # BLD AUTO: 5.2 K/UL
NEUTROPHILS NFR BLD: 86.7 %
NEUTROPHILS NFR BLD: 91.8 %
NRBC BLD-RTO: 0 /100 WBC
NRBC BLD-RTO: 0 /100 WBC
PHOSPHATE SERPL-MCNC: 3.7 MG/DL
PLATELET # BLD AUTO: 53 K/UL
PLATELET # BLD AUTO: 54 K/UL
PMV BLD AUTO: 10.7 FL
PMV BLD AUTO: 11.3 FL
POCT GLUCOSE: 107 MG/DL (ref 70–110)
POCT GLUCOSE: 110 MG/DL (ref 70–110)
POCT GLUCOSE: 160 MG/DL (ref 70–110)
POCT GLUCOSE: 183 MG/DL (ref 70–110)
POTASSIUM SERPL-SCNC: 4.2 MMOL/L
PROT SERPL-MCNC: 3.9 G/DL
PROTHROMBIN TIME: 10.7 SEC
RBC # BLD AUTO: 2.82 M/UL
RBC # BLD AUTO: 2.92 M/UL
SODIUM SERPL-SCNC: 134 MMOL/L
SPECIMEN SOURCE: NORMAL
TACROLIMUS BLD-MCNC: 4.5 NG/ML
TRIGL FLD-MCNC: 273 MG/DL
WBC # BLD AUTO: 5.51 K/UL
WBC # BLD AUTO: 5.95 K/UL

## 2018-10-24 PROCEDURE — 99233 SBSQ HOSP IP/OBS HIGH 50: CPT | Mod: 24,,, | Performed by: PHYSICIAN ASSISTANT

## 2018-10-24 PROCEDURE — 80053 COMPREHEN METABOLIC PANEL: CPT

## 2018-10-24 PROCEDURE — 97110 THERAPEUTIC EXERCISES: CPT

## 2018-10-24 PROCEDURE — 25000003 PHARM REV CODE 250: Performed by: STUDENT IN AN ORGANIZED HEALTH CARE EDUCATION/TRAINING PROGRAM

## 2018-10-24 PROCEDURE — 25000003 PHARM REV CODE 250: Performed by: NURSE PRACTITIONER

## 2018-10-24 PROCEDURE — 83735 ASSAY OF MAGNESIUM: CPT

## 2018-10-24 PROCEDURE — 94799 UNLISTED PULMONARY SVC/PX: CPT

## 2018-10-24 PROCEDURE — C9113 INJ PANTOPRAZOLE SODIUM, VIA: HCPCS | Performed by: PHYSICIAN ASSISTANT

## 2018-10-24 PROCEDURE — 63600175 PHARM REV CODE 636 W HCPCS: Performed by: STUDENT IN AN ORGANIZED HEALTH CARE EDUCATION/TRAINING PROGRAM

## 2018-10-24 PROCEDURE — 99232 SBSQ HOSP IP/OBS MODERATE 35: CPT | Mod: ,,, | Performed by: NURSE PRACTITIONER

## 2018-10-24 PROCEDURE — 97116 GAIT TRAINING THERAPY: CPT

## 2018-10-24 PROCEDURE — 85025 COMPLETE CBC W/AUTO DIFF WBC: CPT

## 2018-10-24 PROCEDURE — 85610 PROTHROMBIN TIME: CPT

## 2018-10-24 PROCEDURE — P9047 ALBUMIN (HUMAN), 25%, 50ML: HCPCS | Mod: JG | Performed by: PHYSICIAN ASSISTANT

## 2018-10-24 PROCEDURE — 94664 DEMO&/EVAL PT USE INHALER: CPT

## 2018-10-24 PROCEDURE — 97530 THERAPEUTIC ACTIVITIES: CPT

## 2018-10-24 PROCEDURE — 80197 ASSAY OF TACROLIMUS: CPT

## 2018-10-24 PROCEDURE — 25000003 PHARM REV CODE 250: Performed by: TRANSPLANT SURGERY

## 2018-10-24 PROCEDURE — 20600001 HC STEP DOWN PRIVATE ROOM

## 2018-10-24 PROCEDURE — 63600175 PHARM REV CODE 636 W HCPCS: Performed by: PHYSICIAN ASSISTANT

## 2018-10-24 PROCEDURE — 99900035 HC TECH TIME PER 15 MIN (STAT)

## 2018-10-24 PROCEDURE — 25000003 PHARM REV CODE 250: Performed by: PHYSICIAN ASSISTANT

## 2018-10-24 PROCEDURE — 84100 ASSAY OF PHOSPHORUS: CPT

## 2018-10-24 PROCEDURE — 94761 N-INVAS EAR/PLS OXIMETRY MLT: CPT

## 2018-10-24 PROCEDURE — 99232 SBSQ HOSP IP/OBS MODERATE 35: CPT | Mod: ,,, | Performed by: INTERNAL MEDICINE

## 2018-10-24 PROCEDURE — 63600175 PHARM REV CODE 636 W HCPCS: Mod: JG | Performed by: PHYSICIAN ASSISTANT

## 2018-10-24 PROCEDURE — 85730 THROMBOPLASTIN TIME PARTIAL: CPT

## 2018-10-24 RX ORDER — ALBUMIN HUMAN 250 G/1000ML
25 SOLUTION INTRAVENOUS 2 TIMES DAILY
Status: COMPLETED | OUTPATIENT
Start: 2018-10-24 | End: 2018-10-24

## 2018-10-24 RX ORDER — INSULIN ASPART 100 [IU]/ML
12 INJECTION, SOLUTION INTRAVENOUS; SUBCUTANEOUS
Status: DISCONTINUED | OUTPATIENT
Start: 2018-10-24 | End: 2018-10-25

## 2018-10-24 RX ORDER — FUROSEMIDE 10 MG/ML
40 INJECTION INTRAMUSCULAR; INTRAVENOUS 2 TIMES DAILY
Status: COMPLETED | OUTPATIENT
Start: 2018-10-24 | End: 2018-10-24

## 2018-10-24 RX ADMIN — FUROSEMIDE 40 MG: 10 INJECTION, SOLUTION INTRAMUSCULAR; INTRAVENOUS at 11:10

## 2018-10-24 RX ADMIN — AMOXICILLIN 500 MG: 500 CAPSULE ORAL at 08:10

## 2018-10-24 RX ADMIN — OXYCODONE HYDROCHLORIDE 5 MG: 5 TABLET ORAL at 08:10

## 2018-10-24 RX ADMIN — MAGNESIUM OXIDE TAB 400 MG (241.3 MG ELEMENTAL MG) 800 MG: 400 (241.3 MG) TAB at 08:10

## 2018-10-24 RX ADMIN — PANTOPRAZOLE SODIUM 40 MG: 40 INJECTION, POWDER, FOR SOLUTION INTRAVENOUS at 07:10

## 2018-10-24 RX ADMIN — LEVOFLOXACIN 250 MG: 250 TABLET, FILM COATED ORAL at 07:10

## 2018-10-24 RX ADMIN — SULFAMETHOXAZOLE AND TRIMETHOPRIM 1 TABLET: 400; 80 TABLET ORAL at 07:10

## 2018-10-24 RX ADMIN — MAGNESIUM OXIDE TAB 400 MG (241.3 MG ELEMENTAL MG) 800 MG: 400 (241.3 MG) TAB at 07:10

## 2018-10-24 RX ADMIN — MIDODRINE HYDROCHLORIDE 15 MG: 5 TABLET ORAL at 07:10

## 2018-10-24 RX ADMIN — ALBUMIN HUMAN 25 G: 0.25 SOLUTION INTRAVENOUS at 11:10

## 2018-10-24 RX ADMIN — PANTOPRAZOLE SODIUM 40 MG: 40 INJECTION, POWDER, FOR SOLUTION INTRAVENOUS at 08:10

## 2018-10-24 RX ADMIN — TACROLIMUS 0.5 MG: 0.5 CAPSULE ORAL at 07:10

## 2018-10-24 RX ADMIN — BISACODYL 10 MG: 5 TABLET, COATED ORAL at 08:10

## 2018-10-24 RX ADMIN — URSODIOL 300 MG: 300 CAPSULE ORAL at 07:10

## 2018-10-24 RX ADMIN — ENTECAVIR 0.5 MG: 0.5 TABLET ORAL at 08:10

## 2018-10-24 RX ADMIN — INSULIN ASPART 12 UNITS: 100 INJECTION, SOLUTION INTRAVENOUS; SUBCUTANEOUS at 01:10

## 2018-10-24 RX ADMIN — MYCOPHENOLATE MOFETIL 1000 MG: 250 CAPSULE ORAL at 07:10

## 2018-10-24 RX ADMIN — ALBUMIN HUMAN 25 G: 0.25 SOLUTION INTRAVENOUS at 08:10

## 2018-10-24 RX ADMIN — FUROSEMIDE 40 MG: 10 INJECTION, SOLUTION INTRAMUSCULAR; INTRAVENOUS at 05:10

## 2018-10-24 RX ADMIN — MYCOPHENOLATE MOFETIL 1000 MG: 250 CAPSULE ORAL at 08:10

## 2018-10-24 RX ADMIN — PREDNISONE 10 MG: 10 TABLET ORAL at 07:10

## 2018-10-24 RX ADMIN — INSULIN ASPART 12 UNITS: 100 INJECTION, SOLUTION INTRAVENOUS; SUBCUTANEOUS at 07:10

## 2018-10-24 RX ADMIN — MIDODRINE HYDROCHLORIDE 15 MG: 5 TABLET ORAL at 01:10

## 2018-10-24 RX ADMIN — URSODIOL 300 MG: 300 CAPSULE ORAL at 08:10

## 2018-10-24 RX ADMIN — MIRTAZAPINE 7.5 MG: 7.5 TABLET ORAL at 08:10

## 2018-10-24 RX ADMIN — MIDODRINE HYDROCHLORIDE 15 MG: 5 TABLET ORAL at 08:10

## 2018-10-24 RX ADMIN — FLUCONAZOLE 200 MG: 200 TABLET ORAL at 07:10

## 2018-10-24 RX ADMIN — INSULIN ASPART 14 UNITS: 100 INJECTION, SOLUTION INTRAVENOUS; SUBCUTANEOUS at 08:10

## 2018-10-24 NOTE — PLAN OF CARE
Problem: Occupational Therapy Goal  Goal: Occupational Therapy Goal  Goals to be met by: 11/7/18 ( revised 10/24/18)    Patient will increase functional independence with ADLs by performing:    Feeding with Joseph.  UE Dressing with Supervision.  LE Dressing with Supervision.  Grooming while standing at sink with Supervision.  Toileting from toilet with Supervision for hygiene and clothing management.   Toilet transfer to toilet with Supervision.     Outcome: Ongoing (interventions implemented as appropriate)  Continue with POC. Goals updated.   Laura corado OT  10/24/2018

## 2018-10-24 NOTE — CARE UPDATE
Pt AAOx4 with Daughter, Gabriela, at the bedside.  Pt is jaundiced. Sclera yellow. 3+ edema to BLE. Distended abdomen.  Pt on contact iso for ESBL in urine.  Pt speaks Kyrgyz. , Dunia, used for translation.   R IJ TLC CDI.   H/H stable. Pt received 1 units of PRBCs yesterday.  Serial CBCs Q8h. Most recent hg 9.4.  Pt up to bathroom with 1 assist ans walker. Pt being diuresed with IV lasix 80 mg BID.   VSS. Pt on tele- SR with HR 70's-90's. ST when up to bathroom.   Chevron incision JULIANE with staples CDI. Painted with betadine. Leaking from old drain sites now resolved since having stitch placed yesterday.   Pt had Liver US yesterday. Showed interval decrease in peak systolic velocities and stable mert transplant fluid collections.  Accuchecks AC/HS. HS . Pt covered with 1 unit of Novolog per sliding scale orders.  Pt remained free from falls or injury thus far. Bed is in low/ locked position, side rails are up x 2, call light is in reach. Will continue to monitor.

## 2018-10-24 NOTE — PROGRESS NOTES
"[facility]  Endocrinology  Progress Note    Admit Date: 10/1/2018     Reason for Consult: Management of type 2 DM, Hyperglycemia      Surgical Procedure and Date: Liver transplant 10/5/18, Ex lap 10/6/18     Diabetes diagnosis year:      Home Diabetes Medications:  Lantus 18 units HS and novolog 17 units with meals plus correction scale (150-200 +1)        Lab Results   Component Value Date     HGBA1C 6.8 (H) 2018        How often checking glucose at home? 3-4   BG readings on regimen: 120-150.  Post prandial readings as high as upper 200s  Hypoglycemia on the regimen? yes, none recently   Missed doses on regimen?  No     Diabetes Complications include:     Diabetic peripheral neuropathy      Complicating diabetes co morbidities:   CIRRHOSIS        HPI:  Patient is a 68 y.o. female with a diagnosis of ESLD, listed for liver transplant with meld 23 who underwent live transplant on 10/5/18.  Back to OR on 10/6/18 for ex lap.  Admitted 10/1/18 for hyponatremia s/p paracentesis. She speaks primarily Belarusian. Information obtained from recent past admission and discussion over the phone via  (Dunia). Family not at bedside. Pt request family at bedside for future conversations. Personal has known diagnosis of diabetes, endocrine consulted for diabetes management.        Interval HPI:   Overnight events: remains in TSU. NAEON. BG above goal overnight but below goal this AM.  at bedside. Interpretor at bedside. Prednisone 10 mg daily.   Eatin-  100%  Nausea: No  Hypoglycemia and intervention: No  Fever: No  TPN and/or TF: No    /61   Pulse 105   Temp 97.8 °F (36.6 °C) (Oral)   Resp 12   Ht 5' 3" (1.6 m)   Wt 72.2 kg (159 lb 3.2 oz)   LMP  (LMP Unknown)   SpO2 99%   Breastfeeding? No   BMI 28.20 kg/m²      Labs Reviewed and Include    Recent Labs   Lab 10/24/18  0600   GLU 76   CALCIUM 8.4*   ALBUMIN 2.6*   PROT 3.9*   *   K 4.2   CO2 26   CL 98   BUN " 87*   CREATININE 2.1*   ALKPHOS 110   ALT 21   AST 25   BILITOT 2.8*     Lab Results   Component Value Date    WBC 5.95 10/24/2018    HGB 8.5 (L) 10/24/2018    HCT 26.4 (L) 10/24/2018    MCV 90 10/24/2018    PLT 53 (L) 10/24/2018     No results for input(s): TSH, FREET4 in the last 168 hours.  Lab Results   Component Value Date    HGBA1C 6.0 (H) 10/04/2018       Nutritional status:   Body mass index is 28.2 kg/m².  Lab Results   Component Value Date    ALBUMIN 2.6 (L) 10/24/2018    ALBUMIN 2.7 (L) 10/23/2018    ALBUMIN 2.6 (L) 10/22/2018     Lab Results   Component Value Date    PREALBUMIN 12 (L) 09/17/2018       Estimated Creatinine Clearance: 24.1 mL/min (A) (based on SCr of 2.1 mg/dL (H)).    Accu-Checks  Recent Labs     10/22/18  0147 10/22/18  0822 10/22/18  1255 10/22/18  1805 10/22/18  2306 10/23/18  0759 10/23/18  1317 10/23/18  1714 10/23/18  2127 10/24/18  0807   POCTGLUCOSE 143* 147* 228* 217* 211* 114* 276* 231* 196* 107       Current Medications and/or Treatments Impacting Glycemic Control  Immunotherapy:    Immunosuppressants         Stop Route Frequency     tacrolimus capsule 0.5 mg      -- Oral Daily     mycophenolate capsule 1,000 mg      -- Oral 2 times daily        Steroids:   Hormones (From admission, onward)    Start     Stop Route Frequency Ordered    11/07/18 0900  predniSONE tablet 2.5 mg      11/14 0859 Oral Daily 10/17/18 0943    10/31/18 0900  predniSONE tablet 5 mg      11/07 0859 Oral Daily 10/17/18 0943    10/24/18 0900  predniSONE tablet 10 mg      10/31 0859 Oral Daily 10/17/18 0943    10/17/18 0945  predniSONE tablet 15 mg      10/24 0859 Oral Daily 10/17/18 0943        Pressors:    Autonomic Drugs (From admission, onward)    Start     Stop Route Frequency Ordered    10/10/18 1500  midodrine tablet 15 mg      -- Oral 3 times daily 10/10/18 1122    10/06/18 1617  rocuronium (ZEMURON) 10 mg/mL injection     Comments:  Created by cabinet override    10/07 0429   10/06/18 2129         Hyperglycemia/Diabetes Medications:   Antihyperglycemics (From admission, onward)    Start     Stop Route Frequency Ordered    10/23/18 2100  insulin detemir U-100 pen 3 Units      -- SubQ Nightly 10/23/18 0817    10/23/18 1645  insulin aspart U-100 pen 14 Units      -- SubQ 3 times daily with meals 10/23/18 1402    10/23/18 0900  insulin detemir U-100 pen 4 Units      -- SubQ Daily 10/23/18 0817    10/14/18 0925  insulin aspart U-100 pen 0-5 Units      -- SubQ Before meals & nightly PRN 10/14/18 0825    10/07/18 1200  insulin regular (Humulin R) 100 Units in sodium chloride 0.9% 100 mL infusion      10/13 2100 IV Continuous 10/07/18 1055          ASSESSMENT and PLAN    * Liver transplanted    Managed per primary team  Avoid hypoglycemia    Recent Labs   Lab 10/24/18  0600   ALT 21   AST 25   ALKPHOS 110   BILITOT 2.8*   PROT 3.9*   ALBUMIN 2.6*            Type 2 diabetes mellitus with diabetic polyneuropathy, with long-term current use of insulin    BG goal 140 - 180     Hold Levemir.   Novolog to 14 units with meals.   Low dose correction scale insulin  BG monitoring AC/HS      Discharge planning:  TBD     Corticosteroids adverse reaction    On standard steroid taper per transplant team; may elevate BG readings         EVA (acute kidney injury)    Avoid insulin stacking and hypoglycemia.  Lab Results   Component Value Date    CREATININE 2.1 (H) 10/24/2018            Prophylactic immunotherapy    May increase insulin resistance.        Severe malnutrition    Oral intake encouraged, may affect BG readings         Aviva Caldera NP  Endocrinology  [facility]

## 2018-10-24 NOTE — PT/OT/SLP PROGRESS
Physical Therapy Treatment    Patient Name:  Scarlett Reddy   MRN:  [id number]    Recommendations:     Discharge Recommendations:  home health PT, home health OT   Discharge Equipment Recommendations: walker, rolling   Barriers to discharge: Decreased caregiver support at current functional level    Assessment:     Scarlett Reddy is a 68 y.o. female admitted with a medical diagnosis of Liver transplanted.  She presents with the following impairments/functional limitations:  weakness, gait instability, impaired endurance, impaired balance, decreased lower extremity function, impaired cardiopulmonary response to activity, edema, impaired functional mobilty, impaired self care skills. Pt able to progress functional mobility from yesterday's session. Ambulation distance increased and with improved gait quality noted throughout. Continues to require increased assist to transfer sit>stand. Increased BLE edema also remains, causing increased difficulty with advancing LEs during gait. Pt would continue to benefit from skilled acute PT in order to address current deficits and progress functional mobility.     Rehab Prognosis:  good; patient would benefit from acute skilled PT services to address these deficits and reach maximum level of function.      Recent Surgery: Procedure(s) (LRB):  EGD (ESOPHAGOGASTRODUODENOSCOPY) (N/A) 6 Days Post-Op    Plan:     During this hospitalization, patient to be seen 4 x/week to address the above listed problems via gait training, therapeutic activities, therapeutic exercises, neuromuscular re-education  · Plan of Care Expires:  11/05/18   Plan of Care Reviewed with: patient, spouse    Subjective     Communicated with RN prior to session.  Patient found UIC upon PT entry to room, agreeable to treatment.      Chief Complaint: BLE edema and donning OLAF hose  Pain/Comfort:  · Pain Rating 1: 0/10    Patients cultural, spiritual, Amish conflicts given the current situation: none noted      Objective:     Patient found with: telemetry, pulse ox (continuous)     General Precautions: Standard, fall, contact   Orthopedic Precautions:N/A   Braces: N/A     Functional Mobility:  · Transfers:     · Sit to Stand:  moderate assistance with rolling walker  · Cues for transfer technique with RW   · Gait: ~100 ft. with RW and CGA  · Chair follow throughout but no seated rest break needed  ? Decreased step length, decreased giuseppe, impaired weight-shifting ability, decreased heel strike, decreased toe-floor clearance, increased forward lean       AM-PAC 6 CLICK MOBILITY   Turning over in bed (including adjusting bedclothes, sheets and blankets)?: 3  Sitting down on and standing up from a chair with arms (e.g., wheelchair, bedside commode, etc.): 2  Moving from lying on back to sitting on the side of the bed?: 3  Moving to and from a bed to a chair (including a wheelchair)?: 3  Need to walk in hospital room?: 3  Climbing 3-5 steps with a railing?: 2  Basic Mobility Total Score: 16       Therapeutic Activities and Exercises:   Assisted with donning OLAF hose. Pt educated on the reasoning for and importance of OLAF hose with  present to assist with education.      Patient left up in chair with all lines intact, call button in reach, RN notified and pt's , , and RN student present..    GOALS:   Multidisciplinary Problems     Physical Therapy Goals        Problem: Physical Therapy Goal    Goal Priority Disciplines Outcome Goal Variances Interventions   Physical Therapy Goal     PT, PT/OT Ongoing (interventions implemented as appropriate)     Description:  Goals to be met by: 2018    Patient will increase functional independence with mobility by performin. Supine to sit with Contact Guard Assistance - not met  2. Sit to stand transfer with Supervision using LRAD or no AD - not met  3. Bed to chair transfer with Supervision using LRAD or no AD - not met  4. Gait  x 250 feet with  Supervision using LRAD or no AD - not met  5. Stand for 3 minutes with Supervision to increase activity tolerance - not met   6. Lower extremity exercise program x15 reps per handout, with independence - not met                        Time Tracking:     PT Received On: 10/24/18  PT Start Time: 1041     PT Stop Time: 1105  PT Total Time (min): 24 min     Billable Minutes: Gait Training 12 and Therapeutic Activity 12    Treatment Type: Treatment  PT/PTA: PT     PTA Visit Number: 0     Radha Nguyen PT, DPT   10/24/2018  [phone]

## 2018-10-24 NOTE — SUBJECTIVE & OBJECTIVE
Interval History:No acute events overnight. She remains to have good urine output but has decrease in hgb again. Creatinine  today 2.1 frrom 1.9  Last Sled was 10/20 with no improvement in kidney function        Review of patient's allergies indicates:  No Known Allergies  Current Facility-Administered Medications   Medication Frequency    albumin human 25% bottle 25 g BID    amoxicillin capsule 500 mg Q12H    bisacodyl EC tablet 10 mg Daily PRN    bisacodyl suppository 10 mg Daily PRN    calcium carbonate 200 mg calcium (500 mg) chewable tablet 500 mg TID PRN    dextrose 50% injection 12.5 g PRN    dextrose 50% injection 25 g PRN    docusate sodium capsule 100 mg TID PRN    entecavir tablet 0.5 mg Q72H    ergocalciferol capsule 50,000 Units Q7 Days    fluconazole tablet 200 mg Daily    furosemide injection 40 mg BID    glucagon (human recombinant) injection 1 mg PRN    glucose chewable tablet 16 g PRN    glucose chewable tablet 24 g PRN    insulin aspart U-100 pen 0-5 Units QID (AC + HS) PRN    insulin aspart U-100 pen 14 Units TIDWM    levoFLOXacin tablet 250 mg Daily    magnesium oxide tablet 800 mg BID    midodrine tablet 15 mg TID    mirtazapine tablet 7.5 mg QHS    mycophenolate capsule 1,000 mg BID    ondansetron disintegrating tablet 8 mg Q8H PRN    ondansetron injection 4 mg Q8H PRN    oxyCODONE immediate release tablet 5 mg Q4H PRN    oxyCODONE immediate release tablet Tab 10 mg Q4H PRN    pantoprazole injection 40 mg BID    polyethylene glycol packet 17 g Daily PRN    predniSONE tablet 10 mg Daily    Followed by    [START ON 10/31/2018] predniSONE tablet 5 mg Daily    Followed by    [START ON 11/7/2018] predniSONE tablet 2.5 mg Daily    promethazine (PHENERGAN) 6.25 mg in dextrose 5 % 50 mL IVPB Q6H PRN    sodium chloride 0.9% flush 3 mL PRN    sulfamethoxazole-trimethoprim 400-80mg per tablet 1 tablet Every Mon, Wed, Fri    tacrolimus capsule 0.5 mg Daily    ursodiol  capsule 300 mg BID    valGANciclovir tablet 450 mg Twice Weekly       Objective:     Vital Signs (Most Recent):  Temp: 97.9 °F (36.6 °C) (10/24/18 1124)  Pulse: 103 (10/24/18 1140)  Resp: 14 (10/24/18 1140)  BP: 102/71 (10/24/18 1140)  SpO2: 98 % (10/24/18 1140)  O2 Device (Oxygen Therapy): room air (10/24/18 1140) Vital Signs (24h Range):  Temp:  [97.8 °F (36.6 °C)-97.9 °F (36.6 °C)] 97.9 °F (36.6 °C)  Pulse:  [] 103  Resp:  [8-22] 14  SpO2:  [97 %-99 %] 98 %  BP: (100-132)/(59-82) 102/71     Weight: 72.2 kg (159 lb 3.2 oz) (10/22/18 0500)  Body mass index is 28.2 kg/m².  Body surface area is 1.79 meters squared.    I/O last 3 completed shifts:  In: 1080 [P.O.:1080]  Out: 1375 [Urine:1375]    Physical Exam   Constitutional: She is oriented to person, place, and time.   Temporal and distal extremity muscle wasting   Eyes: EOM are normal. Pupils are equal, round, and reactive to light. Scleral icterus is present.   Neck: Normal range of motion.   Cardiovascular: Normal rate and regular rhythm.   No murmur heard.  Pulmonary/Chest: Effort normal and breath sounds normal. No respiratory distress. She has no wheezes.   Abdominal: Soft. Bowel sounds are normal. She exhibits distension. There is tenderness. There is no guarding.   Musculoskeletal: Normal range of motion. She exhibits edema (3+ BLE).   Neurological: She is alert and oriented to person, place, and time.   Skin: Skin is warm and dry.   Nursing note and vitals reviewed.      Significant Labs:  CBC:   Recent Labs   Lab 10/24/18  0600   WBC 5.95   RBC 2.92*   HGB 8.5*   HCT 26.4*   PLT 53*   MCV 90   MCH 29.1   MCHC 32.2     CMP:   Recent Labs   Lab 10/24/18  0600   GLU 76   CALCIUM 8.4*   ALBUMIN 2.6*   PROT 3.9*   *   K 4.2   CO2 26   CL 98   BUN 87*   CREATININE 2.1*   ALKPHOS 110   ALT 21   AST 25   BILITOT 2.8*     Coagulation:   Recent Labs   Lab 10/24/18  0600   INR 1.0   APTT 22.1     LFTs:   Recent Labs   Lab 10/24/18  0600   ALT 21   AST  25   ALKPHOS 110   BILITOT 2.8*   PROT 3.9*   ALBUMIN 2.6*     All labs within the past 24 hours have been reviewed.     Significant Imaging:  Labs: Reviewed

## 2018-10-24 NOTE — ASSESSMENT & PLAN NOTE
Avoid insulin stacking and hypoglycemia.  Lab Results   Component Value Date    CREATININE 2.1 (H) 10/24/2018

## 2018-10-24 NOTE — SUBJECTIVE & OBJECTIVE
Scheduled Meds:   albumin human 25%  25 g Intravenous BID    amoxicillin  500 mg Oral Q12H    entecavir  0.5 mg Oral Q72H    ergocalciferol  50,000 Units Oral Q7 Days    fluconazole  200 mg Oral Daily    furosemide  40 mg Intravenous BID    insulin aspart U-100  14 Units Subcutaneous TIDWM    levoFLOXacin  250 mg Oral Daily    magnesium oxide  800 mg Oral BID    midodrine  15 mg Oral TID    mirtazapine  7.5 mg Oral QHS    mycophenolate  1,000 mg Oral BID    pantoprazole  40 mg Intravenous BID    predniSONE  10 mg Oral Daily    Followed by    [START ON 10/31/2018] predniSONE  5 mg Oral Daily    Followed by    [START ON 11/7/2018] predniSONE  2.5 mg Oral Daily    sulfamethoxazole-trimethoprim 400-80mg  1 tablet Oral Every Mon, Wed, Fri    tacrolimus  0.5 mg Oral Daily    ursodiol  300 mg Oral BID    valGANciclovir  450 mg Oral Twice Weekly     Continuous Infusions:  PRN Meds:bisacodyl, bisacodyl, calcium carbonate, dextrose 50%, dextrose 50%, docusate sodium, glucagon (human recombinant), glucose, glucose, insulin aspart U-100, ondansetron, ondansetron, oxyCODONE, oxyCODONE, polyethylene glycol, promethazine (PHENERGAN) IVPB, sodium chloride 0.9%    Review of Systems   Constitutional: Positive for activity change and fatigue. Negative for fever.   HENT: Positive for sore throat and voice change (hoarseness ).    Respiratory: Negative for cough and shortness of breath.    Cardiovascular: Positive for leg swelling. Negative for chest pain.   Gastrointestinal: Positive for abdominal distention and abdominal pain. Negative for diarrhea, nausea and vomiting.   Genitourinary: Negative for decreased urine volume, difficulty urinating and dysuria.   Musculoskeletal: Negative for arthralgias and back pain.   Neurological: Positive for weakness. Negative for dizziness and tremors.     Objective:     Vital Signs (Most Recent):  Temp: 97.9 °F (36.6 °C) (10/24/18 1124)  Pulse: 103 (10/24/18 1140)  Resp: 14  (10/24/18 1140)  BP: 102/71 (10/24/18 1140)  SpO2: 98 % (10/24/18 1140) Vital Signs (24h Range):  Temp:  [97.8 °F (36.6 °C)-97.9 °F (36.6 °C)] 97.9 °F (36.6 °C)  Pulse:  [] 103  Resp:  [8-22] 14  SpO2:  [97 %-99 %] 98 %  BP: (100-132)/(59-82) 102/71     Weight: 72.2 kg (159 lb 3.2 oz)  Body mass index is 28.2 kg/m².    Intake/Output - Last 3 Shifts       10/22 0700 - 10/23 0659 10/23 0700 - 10/24 0659 10/24 0700 - 10/25 0659    P.O. 900 720     Total Intake(mL/kg) 900 (12.5) 720 (10)     Urine (mL/kg/hr) 1000 (0.6) 975 (0.6) 450 (1.1)    Emesis/NG output       Other 2000      Stool 0 0 0    Blood       Total Output 3000 975 450    Net -2100 -255 -450           Urine Occurrence 2 x 2 x 0 x    Stool Occurrence 3 x 3 x 0 x          Physical Exam   Constitutional: She is oriented to person, place, and time.   Temporal and distal extremity muscle wasting   Eyes: EOM are normal. Pupils are equal, round, and reactive to light. Scleral icterus is present.   Neck: Normal range of motion.   Cardiovascular: Normal rate and regular rhythm.   No murmur heard.  Pulmonary/Chest: Effort normal and breath sounds normal. No respiratory distress. She has no wheezes.   Abdominal: Soft. Bowel sounds are normal. She exhibits distension. There is tenderness. There is no guarding.   Chevron incision w/ staples, CDI     Musculoskeletal: Normal range of motion. She exhibits edema (3+ BLE).   Neurological: She is alert and oriented to person, place, and time.   Skin: Skin is warm and dry.   Nursing note and vitals reviewed.      Laboratory:  Immunosuppressants         Stop Route Frequency     tacrolimus capsule 0.5 mg      -- Oral Daily     mycophenolate capsule 1,000 mg      -- Oral 2 times daily        CBC:   Recent Labs   Lab 10/24/18  0600   WBC 5.95   RBC 2.92*   HGB 8.5*   HCT 26.4*   PLT 53*   MCV 90   MCH 29.1   MCHC 32.2     CMP:   Recent Labs   Lab 10/24/18  0600   GLU 76   CALCIUM 8.4*   ALBUMIN 2.6*   PROT 3.9*   *   K  4.2   CO2 26   CL 98   BUN 87*   CREATININE 2.1*   ALKPHOS 110   ALT 21   AST 25   BILITOT 2.8*     Labs within the past 24 hours have been reviewed.    Diagnostic Results:  I have personally reviewed all pertinent imaging studies.

## 2018-10-24 NOTE — SUBJECTIVE & OBJECTIVE
"Interval HPI:   Overnight events: remains in TSU. NAEON. BG above goal overnight but below goal this AM.  at bedside. Interpretor at bedside. Prednisone 10 mg daily.   Eatin-  100%  Nausea: No  Hypoglycemia and intervention: No  Fever: No  TPN and/or TF: No    /61   Pulse 105   Temp 97.8 °F (36.6 °C) (Oral)   Resp 12   Ht 5' 3" (1.6 m)   Wt 72.2 kg (159 lb 3.2 oz)   LMP  (LMP Unknown)   SpO2 99%   Breastfeeding? No   BMI 28.20 kg/m²     Labs Reviewed and Include    Recent Labs   Lab 10/24/18  0600   GLU 76   CALCIUM 8.4*   ALBUMIN 2.6*   PROT 3.9*   *   K 4.2   CO2 26   CL 98   BUN 87*   CREATININE 2.1*   ALKPHOS 110   ALT 21   AST 25   BILITOT 2.8*     Lab Results   Component Value Date    WBC 5.95 10/24/2018    HGB 8.5 (L) 10/24/2018    HCT 26.4 (L) 10/24/2018    MCV 90 10/24/2018    PLT 53 (L) 10/24/2018     No results for input(s): TSH, FREET4 in the last 168 hours.  Lab Results   Component Value Date    HGBA1C 6.0 (H) 10/04/2018       Nutritional status:   Body mass index is 28.2 kg/m².  Lab Results   Component Value Date    ALBUMIN 2.6 (L) 10/24/2018    ALBUMIN 2.7 (L) 10/23/2018    ALBUMIN 2.6 (L) 10/22/2018     Lab Results   Component Value Date    PREALBUMIN 12 (L) 2018       Estimated Creatinine Clearance: 24.1 mL/min (A) (based on SCr of 2.1 mg/dL (H)).    Accu-Checks  Recent Labs     10/22/18  0147 10/22/18  0822 10/22/18  1255 10/22/18  1805 10/22/18  2306 10/23/18  0759 10/23/18  1317 10/23/18  1714 10/23/18  2127 10/24/18  0807   POCTGLUCOSE 143* 147* 228* 217* 211* 114* 276* 231* 196* 107       Current Medications and/or Treatments Impacting Glycemic Control  Immunotherapy:    Immunosuppressants         Stop Route Frequency     tacrolimus capsule 0.5 mg      -- Oral Daily     mycophenolate capsule 1,000 mg      -- Oral 2 times daily        Steroids:   Hormones (From admission, onward)    Start     Stop Route Frequency Ordered    18 0900  predniSONE tablet " 2.5 mg      11/14 0859 Oral Daily 10/17/18 0943    10/31/18 0900  predniSONE tablet 5 mg      11/07 0859 Oral Daily 10/17/18 0943    10/24/18 0900  predniSONE tablet 10 mg      10/31 0859 Oral Daily 10/17/18 0943    10/17/18 0945  predniSONE tablet 15 mg      10/24 0859 Oral Daily 10/17/18 0943        Pressors:    Autonomic Drugs (From admission, onward)    Start     Stop Route Frequency Ordered    10/10/18 1500  midodrine tablet 15 mg      -- Oral 3 times daily 10/10/18 1122    10/06/18 1617  rocuronium (ZEMURON) 10 mg/mL injection     Comments:  Created by cabinet override    10/07 0429   10/06/18 1617        Hyperglycemia/Diabetes Medications:   Antihyperglycemics (From admission, onward)    Start     Stop Route Frequency Ordered    10/23/18 2100  insulin detemir U-100 pen 3 Units      -- SubQ Nightly 10/23/18 0817    10/23/18 1645  insulin aspart U-100 pen 14 Units      -- SubQ 3 times daily with meals 10/23/18 1402    10/23/18 0900  insulin detemir U-100 pen 4 Units      -- SubQ Daily 10/23/18 0817    10/14/18 0925  insulin aspart U-100 pen 0-5 Units      -- SubQ Before meals & nightly PRN 10/14/18 0825    10/07/18 1200  insulin regular (Humulin R) 100 Units in sodium chloride 0.9% 100 mL infusion      10/13 2100 IV Continuous 10/07/18 7607

## 2018-10-24 NOTE — ASSESSMENT & PLAN NOTE
BG goal 140 - 180     Hold Levemir.   Novolog to 14 units with meals.   Low dose correction scale insulin  BG monitoring AC/HS      Discharge planning:  [facility]

## 2018-10-24 NOTE — PLAN OF CARE
Problem: Physical Therapy Goal  Goal: Physical Therapy Goal  Goals to be met by: 2018    Patient will increase functional independence with mobility by performin. Supine to sit with Contact Guard Assistance - not met  2. Sit to stand transfer with Supervision using LRAD or no AD - not met  3. Bed to chair transfer with Supervision using LRAD or no AD - not met  4. Gait  x 250 feet with Supervision using LRAD or no AD - not met  5. Stand for 3 minutes with Supervision to increase activity tolerance - not met   6. Lower extremity exercise program x15 reps per handout, with independence - not met       Outcome: Ongoing (interventions implemented as appropriate)  Goals reviewed and remain appropriate. Pt progressing towards goals.    Radha Nguyen, PT, DPT   10/24/2018  [phone]

## 2018-10-24 NOTE — ASSESSMENT & PLAN NOTE
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      - acute gradual worsening of renal function s/p OLT  - 10/06 was taken to the OR for possibility of billiary leak and post op complication due to hypercapnic hypoxic respiratory failure, no blood pressure drop intraoperatively per anesthesiology notes. Patient has not been needing vasopressors to maintain BP  - Creatinine on arrival 0.8 and has been up trending   - BUN/cr (10/11/18): 91/1.9; (10/12/18): 100/2.0; (10/13/18): 110/2.4  - Tacrolimus 26.9 (10/08); 6.8 (10/12); 5.6 (10/13)  - Protein Creatine ratio: 0.82  - UOP: 900cc / 24 hrs  - UA consistent with hematuria, proteinuria, RBC and amorphous casts   - Fena 0.5%; consistent with hypovolemia   - Renal US negative for obstruction, stricture or, hydronephrosis    Assessment: Acute Renal failure in setting of hepatic ischemia reperfusion injury and poor perfusion status vs acute tubular necrosis from immunosuppressive agents      Plan:  -SLED for uremic toxin clearance was perfromed over the weekend for uremic platelet dysfunction. She remained to be anemic and still required PRBCs. Will continue to watch her kidney function but for now do not think that SLED provided any benefit    -  Remains to have UO adequate with current diuresis, avoid aggressive diuresis as she also has ostomy output yesterday . In the last 24 hours her output was 2100.   - Replete her magnesium as it remains low   -Modified renal diet : decrease in protein , normal intake of K and phos   - Start lasix 40 mg PO BID   - avoid nephrotoxic drugs, ACEI/ARB, NSAIDS  - adjust drugs to GFR  - Agree with fluid restriction of 1500 cc/day  - Acute RRT not indicated at this point

## 2018-10-24 NOTE — ASSESSMENT & PLAN NOTE
- Creatinine on arrival 0.8 and trended up  -BUN/cr (10/11/18): 91/1.9   -Tacrolimus 26.9 (10/08)  -Nephrology consulted  -Renal US (10/11): no hydronephrosis  -Echo 10/12: diastolic dysfunction, likely 2/2 to ARF  - pt on renal diet w 1500 ml FR.  -CRRT 10/14; SLED 10/17, 10/20  -Repeat Liver US 10/22- decreased velocities. Will monitor    Problem: Patient Care Overview (Adult)  Goal: Plan of Care Review    10/15/17 0357   Coping/Psychosocial Response Interventions   Plan Of Care Reviewed With patient;spouse   Patient Care Overview   Progress improving   Outcome Evaluation   Outcome Summary/Follow up Plan Pt complained of acute pain in right lateral foot, he was concerned it may have been injured a couple of days ago, Dr. Arrieta was notified, and an x-ray was ordered; rash appears to be resolving; blood pressure improved; continuing to monitor pt       Goal: Adult Individualization and Mutuality  Outcome: Ongoing (interventions implemented as appropriate)  Goal: Discharge Needs Assessment  Outcome: Ongoing (interventions implemented as appropriate)    Problem: Acute Coronary Syndrome (ACS) (Adult)  Goal: Signs and Symptoms of Listed Potential Problems Will be Absent or Manageable (Acute Coronary Syndrome)  Outcome: Ongoing (interventions implemented as appropriate)    Problem: Diabetes, Type 2 (Adult)  Goal: Signs and Symptoms of Listed Potential Problems Will be Absent or Manageable (Diabetes, Type 2)  Outcome: Ongoing (interventions implemented as appropriate)    Problem: Pain, Chronic (Adult)  Goal: Acceptable Pain Control/Comfort Level  Outcome: Ongoing (interventions implemented as appropriate)    Problem: COPD, Chronic Bronchitis/Emphysema (Adult)  Goal: Signs and Symptoms of Listed Potential Problems Will be Absent or Manageable (COPD, Chronic Bronchitis/Emphysema)  Outcome: Ongoing (interventions implemented as appropriate)    Problem: Skin Integrity Impairment, Risk/Actual (Adult)  Goal: Skin Integrity/Wound Healing  Outcome: Ongoing (interventions implemented as appropriate)    Problem: Hypertensive Disease/Crisis (Arterial) (Adult)  Goal: Signs and Symptoms of Listed Potential Problems Will be Absent or Manageable (Hypertensive Disease/Crisis)  Outcome: Ongoing (interventions implemented as appropriate)

## 2018-10-24 NOTE — ASSESSMENT & PLAN NOTE
- Diuresed w/ Lasix 100mg x 1 (10/12).  - CRRT 10/14.  .    -SLED 10/17, 10/20  -Diuresing w/ Lasix 80 BID-- stopped (10/23)  -Will assess the need for diuresis on a daily basis  -today: Lasix 40mg x 2; Albumin x 2

## 2018-10-24 NOTE — PROGRESS NOTES
[facility]  Liver Transplant  Progress Note    Patient Name: Scarlett Reddy  MRN: [id number]  Admission Date: 10/1/2018  Hospital Length of Stay: 22 days  Code Status: Full Code  Primary Care Provider: Primary Doctor No  Post-Operative Day: 19    ORGAN:   LIVER  Disease Etiology: Cirrhosis: Type B and D  Donor Type:    - Brain Death  CDC High Risk:   No  Donor CMV Status:   Donor CMV Status: Positive  Donor HBcAB:   Negative  Donor HCV Status:   Negative  Donor HBV SETH: Negative  Donor HCV SETH: Negative  Whole or Partial: Whole Liver  Biliary Anastomosis: End to End  Arterial Anatomy: Replaced Left Hepatic and Right Hepatic  Subjective:     History of Present Illness:  Ms. Reddy is a 69 y/o female with PMH of ESLD secondary Hep B and D.  Listed for liver transplant with MELD 23.  Paracentesis 10/1 with 5L removed, no fluid sent for analysis.  Morning labs significant for hyponatremia, Na 119.  Pt admitted to LTS for sodium monitoring.        Hospital Course:  Hospital Course: 69 y/o F h/o HBV/delta cirrhosis s/p DBDLT 10/5/18 (CMV D+/R+, steroid induction, MMF/tacro/pred maintenance) with take back on 10/6 2/2 hyperbilirubinema and bilious VALORIE drainage, no leak identified. Post-op course c/b hypercapneic and hypoxic respiratory failure and was reintubated though quickly extubated now doing well. Liver bx (10/6) sig for Zone 3 hemorrhage and collapse, in addition to cholestasis. Transferred from ICU on POD #4. ID consulted to comment on positive diphtheroid culture from ascitic fluid (likely skin colonization) as well as ESBL Klebsiella pneumoniae in urine culture (10/4).  Pt asymptomatic and cell count from ascitic fluid unremarkable. Per ID recs, no need to treat diphtheroids or asymptomatic ESBL Kleb pneumo bacteriuria though pt has had 6 days of therapy which would cover these organisms. Mild peritransplant ascites- repeat US 10/6 with increased arterial resistive indices. Repeat US 10/9  w/ continued elevation in RIs and fluid collections. LFTs trending down nicely.     Acute gradual worsening of renal function s/p OLT.Creatinine on arrival 0.8 and has been up trending. Nephrology consulted for post-op EVA. Remains on renal/low K diet. Patient continued to be diuresed with lasix. BUN and Cr remain elevated requiring multiple rounds of dialysis (10/15, 10/17, 10/20). Additionally, pt w/ anemia requiring multiple blood transfusions (10/14, 10/16, 10/18, 10/19, 10/22). Will cont close monitoring H/H, transfuse as needed with goal Hb > 7.0. Decreased platelets, improvements w/ 2 u plts (10/18, 10/19) and DDAVP.   Pt reported dark stools 10/15 but color has normalized. No blood noted on MORELIA. FOBT+. EGD 10/18 consistent with ulcerated mucosa in duodenum s/p coagulation. Biopsy pending. Concern for H. Pylori. Started Amoxicillin/levofloxacin (10/19). Remains on Protonix 40 mg BID.   Of note, Urine cx + Klebsiella pneumoniae (10/13). Received 3 day course of ciprofloxacin, dc'd 10/19.   On 10/19 pm pt c/o crushing chest pain- cardiac workup initiated. Troponin 0.094, likely 2/2 ischemic stress. EKG NSR.     Interval history: Pt doing well this morning. Remains very tender across abdomen, likely 2/2 excess fluid. H/H stable but cont to drop (10.5-->8.5). Monitoring w/ daily CBC to assess the need for blood transfusion. Cr trending up (2.1). Held lasix yesterday. Restarted lasix 40mg x 2. Also giving Albumin x 2 today.  Will cont to reassess the need for diuresis on a daily basis. Pt remains off heparin due to possible ongoing GI bleed. Pt to wear compression stockings at this time. She also c/o persistent sore throat and hoarseness since EGD, w/ minimal improvement. Will consult ENT to evaluate. Pt eating well. Placed on renal/low K and low fat diet. No further complaints at this time.    Scheduled Meds:   albumin human 25%  25 g Intravenous BID    amoxicillin  500 mg Oral Q12H    entecavir  0.5 mg Oral  Q72H    ergocalciferol  50,000 Units Oral Q7 Days    fluconazole  200 mg Oral Daily    furosemide  40 mg Intravenous BID    insulin aspart U-100  14 Units Subcutaneous TIDWM    levoFLOXacin  250 mg Oral Daily    magnesium oxide  800 mg Oral BID    midodrine  15 mg Oral TID    mirtazapine  7.5 mg Oral QHS    mycophenolate  1,000 mg Oral BID    pantoprazole  40 mg Intravenous BID    predniSONE  10 mg Oral Daily    Followed by    [START ON 10/31/2018] predniSONE  5 mg Oral Daily    Followed by    [START ON 11/7/2018] predniSONE  2.5 mg Oral Daily    sulfamethoxazole-trimethoprim 400-80mg  1 tablet Oral Every Mon, Wed, Fri    tacrolimus  0.5 mg Oral Daily    ursodiol  300 mg Oral BID    valGANciclovir  450 mg Oral Twice Weekly     Continuous Infusions:  PRN Meds:bisacodyl, bisacodyl, calcium carbonate, dextrose 50%, dextrose 50%, docusate sodium, glucagon (human recombinant), glucose, glucose, insulin aspart U-100, ondansetron, ondansetron, oxyCODONE, oxyCODONE, polyethylene glycol, promethazine (PHENERGAN) IVPB, sodium chloride 0.9%    Review of Systems   Constitutional: Positive for activity change and fatigue. Negative for fever.   HENT: Positive for sore throat and voice change (hoarseness ).    Respiratory: Negative for cough and shortness of breath.    Cardiovascular: Positive for leg swelling. Negative for chest pain.   Gastrointestinal: Positive for abdominal distention and abdominal pain. Negative for diarrhea, nausea and vomiting.   Genitourinary: Negative for decreased urine volume, difficulty urinating and dysuria.   Musculoskeletal: Negative for arthralgias and back pain.   Neurological: Positive for weakness. Negative for dizziness and tremors.     Objective:     Vital Signs (Most Recent):  Temp: 97.9 °F (36.6 °C) (10/24/18 1124)  Pulse: 103 (10/24/18 1140)  Resp: 14 (10/24/18 1140)  BP: 102/71 (10/24/18 1140)  SpO2: 98 % (10/24/18 1140) Vital Signs (24h Range):  Temp:  [97.8 °F (36.6  °C)-97.9 °F (36.6 °C)] 97.9 °F (36.6 °C)  Pulse:  [] 103  Resp:  [8-22] 14  SpO2:  [97 %-99 %] 98 %  BP: (100-132)/(59-82) 102/71     Weight: 72.2 kg (159 lb 3.2 oz)  Body mass index is 28.2 kg/m².    Intake/Output - Last 3 Shifts       10/22 0700 - 10/23 0659 10/23 0700 - 10/24 0659 10/24 0700 - 10/25 0659    P.O. 900 720     Total Intake(mL/kg) 900 (12.5) 720 (10)     Urine (mL/kg/hr) 1000 (0.6) 975 (0.6) 450 (1.1)    Emesis/NG output       Other 2000      Stool 0 0 0    Blood       Total Output 3000 975 450    Net -2100 -255 -450           Urine Occurrence 2 x 2 x 0 x    Stool Occurrence 3 x 3 x 0 x          Physical Exam   Constitutional: She is oriented to person, place, and time.   Temporal and distal extremity muscle wasting   Eyes: EOM are normal. Pupils are equal, round, and reactive to light. Scleral icterus is present.   Neck: Normal range of motion.   Cardiovascular: Normal rate and regular rhythm.   No murmur heard.  Pulmonary/Chest: Effort normal and breath sounds normal. No respiratory distress. She has no wheezes.   Abdominal: Soft. Bowel sounds are normal. She exhibits distension. There is tenderness. There is no guarding.   Chevron incision w/ staples, CDI     Musculoskeletal: Normal range of motion. She exhibits edema (3+ BLE).   Neurological: She is alert and oriented to person, place, and time.   Skin: Skin is warm and dry.   Nursing note and vitals reviewed.      Laboratory:  Immunosuppressants         Stop Route Frequency     tacrolimus capsule 0.5 mg      -- Oral Daily     mycophenolate capsule 1,000 mg      -- Oral 2 times daily        CBC:   Recent Labs   Lab 10/24/18  0600   WBC 5.95   RBC 2.92*   HGB 8.5*   HCT 26.4*   PLT 53*   MCV 90   MCH 29.1   MCHC 32.2     CMP:   Recent Labs   Lab 10/24/18  0600   GLU 76   CALCIUM 8.4*   ALBUMIN 2.6*   PROT 3.9*   *   K 4.2   CO2 26   CL 98   BUN 87*   CREATININE 2.1*   ALKPHOS 110   ALT 21   AST 25   BILITOT 2.8*     Labs within the  past 24 hours have been reviewed.    Diagnostic Results:  I have personally reviewed all pertinent imaging studies.    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   -POD 1 US: increased arterial resistive indices, suggestive of edema vs cute rejection vs congestion  -Repeat US 10/9 with continued elevation of RIs  -LFTs trending down  -T bili trending down  -repeat liver US (10/11): elevated RIs  - LFTs normalizing            Duodenal ulcer    EGD 10/18- sig for ulcerated duodenal mucosa.   Biopsy pending  Cont protonix 40 BID  Started H. Pylori tx 10/19: Amoxicillin/Levo. Remains on PPI       UTI (urinary tract infection)    Urine Cx 10/15 w/ Klebsiella pneumonia  Started Ciprofloxacin 10/17. Dc'd 10/19. Coverage w/ h. pylori treatment  Pt asymptomatic at this time       Acute blood loss anemia    H/H cont to fluctuate.   FOBT +  EGD 10/18- showed diffuse bleeding with ulcerated duodenal mucosa   Monitoring w/ CBC q 12 to assess the need for transfusion            Post LT - Acute renal failure    - Creatinine on arrival 0.8 and trended up  -BUN/cr (10/11/18): 91/1.9   -Tacrolimus 26.9 (10/08)  -Nephrology consulted  -Renal US (10/11): no hydronephrosis  -Echo 10/12: diastolic dysfunction, likely 2/2 to ARF  - pt on renal diet w 1500 ml FR.  -CRRT 10/14; SLED 10/17, 10/20  -Repeat Liver US 10/22- decreased velocities. Will monitor      Hypervolemia associated with renal insufficiency    - Diuresed w/ Lasix 100mg x 1 (10/12).  - CRRT 10/14.  .    -SLED 10/17, 10/20  -Diuresing w/ Lasix 80 BID-- stopped (10/23)  -Will assess the need for diuresis on a daily basis  -today: Lasix 40mg x 2; Albumin x 2       EVA (acute kidney injury)           Metabolic acidosis    - Started Na bicarb 1300 QID.  -CRRT 10/14  -SLED 10/17, 10/20         At risk  for opportunistic infections    - Valcyte for CMV prophylaxis  - Bactrim for PCP prophylaxis (MWF)  - Fluconazole for fungal prophylaxis        Long-term use of immunosuppressant medication    -  Cellcept/Prograf/pred  -  Held prograf 10/8 for elevated levels  -  Resumed Prograf 10/10/19. Will adjust as needed.   -  Will monitor for signs of toxic side effects, check daily tacrolimus troughs, and change meds accordingly.       Prophylactic immunotherapy    - See long term use immunosuppresion       Ascites    - paracentesis 10/1, 5L removed, no fluid sent for analysis.  - diuresing with albumin and lasix 10/9.  - Per Nephrology, on 10/13- Lasix 80 mg bid and monitor.  No need for RRT.  -  10/14, pt w worsening edema.          Weakness    - see physical decond.       Physical deconditioning    - PT/OT consulted.  - Recommend home health PT and rolling walker at discharge        Hyponatremia    - Na 119 on admit.  -Na was improving post transplant w HD.  - Na stable at this time. Monitoring w/ daily labs            Hypotension    - continue Midodrine 15 mg TID.   Monitor.       Anemia requiring transfusions    - Fluctuating H/H.    -Transfuse 10/14 and 10/16 (1 unit); 10/18 (2 units); 10/19 (1 unit), 10/22 (1 unit)  -Decreased platelets -- 1 u plt given 10/18   -DDAVP given 10/18 and 10/19  -FOBT +  -LDH elevated, Hapto < 10  -EGD 10/18: ulcerated duodenal mucosa   -Monitoring w/ CBC q12hr to assess the need for transfusion        Severe malnutrition    - dietary consulted.   - temporal and distal extremity muscle wasting and edema  - encourage supplements and to increase nutritional intake   - pt on renal/low K+ and low fat diet     Type 2 diabetes mellitus with diabetic polyneuropathy, with long-term current use of insulin    - continue home regimen.  - endocrine consulted.  -Pt off insulin drip at this time.  Apprec endo recs.        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  -Tx w/ Entecavir- dose changed to q72 hours given renal function.             VTE Risk Mitigation (From admission, onward)        Ordered     Place sequential compression device  Until discontinued      10/05/18 0709          The patients clinical status was discussed at multidisplinary rounds, involving transplant surgery, transplant medicine, pharmacy, nursing, nutrition, and social work    Discharge Planning:  No Patient Care Coordination Note on file.      Jihan Soares PA-C  Liver Transplant  [facility]

## 2018-10-24 NOTE — ASSESSMENT & PLAN NOTE
BG goal 140 - 180     Hold Levemir.   Continue Novolog 14 units with meals.   Low dose correction scale insulin  BG monitoring AC/HS      Discharge planning:  [place]

## 2018-10-24 NOTE — ASSESSMENT & PLAN NOTE
Managed per primary team  Avoid hypoglycemia    Recent Labs   Lab 10/24/18  0600   ALT 21   AST 25   ALKPHOS 110   BILITOT 2.8*   PROT 3.9*   ALBUMIN 2.6*

## 2018-10-24 NOTE — ASSESSMENT & PLAN NOTE
- Na 119 on admit.  -Na was improving post transplant w HD.  - Na stable at this time. Monitoring w/ daily labs

## 2018-10-24 NOTE — PROGRESS NOTES
[facility] [address]  Nephrology  Progress Note    Patient Name: Scarlett Reddy  MRN: [id number]  Admission Date: 10/1/2018  Hospital Length of Stay: 22 days  Attending Provider: Jani Theodore MD   Primary Care Physician: Primary Doctor No  Principal Problem:Liver transplanted    Subjective:     HPI: 67 y/o female with ESLD due to Hep B and D cirrhosis MELD of 23 on 10/01/18.  Paracentesis 10/1 with 5L removed, no fluid sent for analysis. 10/02 significant for hyponatremia, Na 119 and she was admitted to LTS for sodium monitoring. On 10/05/18 she is s/p DBDLT (CMV D+/R+, steroid induction, MMF/tacro/pred maintenance) with take back on 10/6 secondary to hyperbilirubinema, no leak identified. Post-op course complicated by hypercapneic and hypoxic respiratory failure and was intubated 10/06 and extubated 10/07. Liver bx (10/6) sig for Zone 3 hemorrhage and collapse, in addition to cholestasis. ID consulted to comment on positive diphtheroid culture from ascitic fluid (likely skin colonization) as well as ESBL Klebsiella pneumoniae in urine culture (10/4).  Pt asymptomatic and cell count from ascitic fluid unremarkable. Mild peritransplant ascites- repeat US 10/6 with increased arterial resistive indices. Repeat US 10/9 w/ continued elevation in RIs and fluid collections. LFTs trending down nicely.     Patient is currently on entecavir 0.5 mg, Hepagam, mycophenalate 1000 mg BID, prednisone 20 mg and prophylaxis with Bactrim, fluconazole and Valgancyclovir.    Nephrology consulted for acute kidney failure is setting of Orthotopic Liver Transplant. Cr 0.8 on 10/05, started to trend up and 1.5 (10/09), 1.8 (10/10), and 1.9 (10/11). UOP 10/10/18: 520 ml. UA (10/09): 1+ occult blood and 1+ protein with RBC and amorphous casts.        Interval History:No acute events overnight. She remains to have good urine output but has decrease in hgb again. Creatinine  today 2.1 frrom 1.9  Last Sled was 10/20 with no improvement in  kidney function        Review of patient's allergies indicates:  No Known Allergies  Current Facility-Administered Medications   Medication Frequency    albumin human 25% bottle 25 g BID    amoxicillin capsule 500 mg Q12H    bisacodyl EC tablet 10 mg Daily PRN    bisacodyl suppository 10 mg Daily PRN    calcium carbonate 200 mg calcium (500 mg) chewable tablet 500 mg TID PRN    dextrose 50% injection 12.5 g PRN    dextrose 50% injection 25 g PRN    docusate sodium capsule 100 mg TID PRN    entecavir tablet 0.5 mg Q72H    ergocalciferol capsule 50,000 Units Q7 Days    fluconazole tablet 200 mg Daily    furosemide injection 40 mg BID    glucagon (human recombinant) injection 1 mg PRN    glucose chewable tablet 16 g PRN    glucose chewable tablet 24 g PRN    insulin aspart U-100 pen 0-5 Units QID (AC + HS) PRN    insulin aspart U-100 pen 14 Units TIDWM    levoFLOXacin tablet 250 mg Daily    magnesium oxide tablet 800 mg BID    midodrine tablet 15 mg TID    mirtazapine tablet 7.5 mg QHS    mycophenolate capsule 1,000 mg BID    ondansetron disintegrating tablet 8 mg Q8H PRN    ondansetron injection 4 mg Q8H PRN    oxyCODONE immediate release tablet 5 mg Q4H PRN    oxyCODONE immediate release tablet Tab 10 mg Q4H PRN    pantoprazole injection 40 mg BID    polyethylene glycol packet 17 g Daily PRN    predniSONE tablet 10 mg Daily    Followed by    [START ON 10/31/2018] predniSONE tablet 5 mg Daily    Followed by    [START ON 11/7/2018] predniSONE tablet 2.5 mg Daily    promethazine (PHENERGAN) 6.25 mg in dextrose 5 % 50 mL IVPB Q6H PRN    sodium chloride 0.9% flush 3 mL PRN    sulfamethoxazole-trimethoprim 400-80mg per tablet 1 tablet Every Mon, Wed, Fri    tacrolimus capsule 0.5 mg Daily    ursodiol capsule 300 mg BID    valGANciclovir tablet 450 mg Twice Weekly       Objective:     Vital Signs (Most Recent):  Temp: 97.9 °F (36.6 °C) (10/24/18 1124)  Pulse: 103 (10/24/18 1140)  Resp:  14 (10/24/18 1140)  BP: 102/71 (10/24/18 1140)  SpO2: 98 % (10/24/18 1140)  O2 Device (Oxygen Therapy): room air (10/24/18 1140) Vital Signs (24h Range):  Temp:  [97.8 °F (36.6 °C)-97.9 °F (36.6 °C)] 97.9 °F (36.6 °C)  Pulse:  [] 103  Resp:  [8-22] 14  SpO2:  [97 %-99 %] 98 %  BP: (100-132)/(59-82) 102/71     Weight: 72.2 kg (159 lb 3.2 oz) (10/22/18 0500)  Body mass index is 28.2 kg/m².  Body surface area is 1.79 meters squared.    I/O last 3 completed shifts:  In: 1080 [P.O.:1080]  Out: 1375 [Urine:1375]    Physical Exam   Constitutional: She is oriented to person, place, and time.   Temporal and distal extremity muscle wasting   Eyes: EOM are normal. Pupils are equal, round, and reactive to light. Scleral icterus is present.   Neck: Normal range of motion.   Cardiovascular: Normal rate and regular rhythm.   No murmur heard.  Pulmonary/Chest: Effort normal and breath sounds normal. No respiratory distress. She has no wheezes.   Abdominal: Soft. Bowel sounds are normal. She exhibits distension. There is tenderness. There is no guarding.   Musculoskeletal: Normal range of motion. She exhibits edema (3+ BLE).   Neurological: She is alert and oriented to person, place, and time.   Skin: Skin is warm and dry.   Nursing note and vitals reviewed.      Significant Labs:  CBC:   Recent Labs   Lab 10/24/18  0600   WBC 5.95   RBC 2.92*   HGB 8.5*   HCT 26.4*   PLT 53*   MCV 90   MCH 29.1   MCHC 32.2     CMP:   Recent Labs   Lab 10/24/18  0600   GLU 76   CALCIUM 8.4*   ALBUMIN 2.6*   PROT 3.9*   *   K 4.2   CO2 26   CL 98   BUN 87*   CREATININE 2.1*   ALKPHOS 110   ALT 21   AST 25   BILITOT 2.8*     Coagulation:   Recent Labs   Lab 10/24/18  0600   INR 1.0   APTT 22.1     LFTs:   Recent Labs   Lab 10/24/18  0600   ALT 21   AST 25   ALKPHOS 110   BILITOT 2.8*   PROT 3.9*   ALBUMIN 2.6*     All labs within the past 24 hours have been reviewed.     Significant Imaging:  Labs: Reviewed    Assessment/Plan:      Post LT  - Acute renal failure    Post-LT EVA is multifactorial in origin and has been related to the severity of liver disease, toxicity of immunosuppressive therapy and hepatic ischaemia reperfusion injury might be a driving force in the aetiology of post-LT EVA.      - acute gradual worsening of renal function s/p OLT  - 10/06 was taken to the OR for possibility of billiary leak and post op complication due to hypercapnic hypoxic respiratory failure, no blood pressure drop intraoperatively per anesthesiology notes. Patient has not been needing vasopressors to maintain BP  - Creatinine on arrival 0.8 and has been up trending   - BUN/cr (10/11/18): 91/1.9; (10/12/18): 100/2.0; (10/13/18): 110/2.4  - Tacrolimus 26.9 (10/08); 6.8 (10/12); 5.6 (10/13)  - Protein Creatine ratio: 0.82  - UOP: 900cc / 24 hrs  - UA consistent with hematuria, proteinuria, RBC and amorphous casts   - Fena 0.5%; consistent with hypovolemia   - Renal US negative for obstruction, stricture or, hydronephrosis    Assessment: Acute Renal failure in setting of hepatic ischemia reperfusion injury and poor perfusion status vs acute tubular necrosis from immunosuppressive agents      Plan:  -SLED for uremic toxin clearance was perfromed over the weekend for uremic platelet dysfunction. She remained to be anemic and still required PRBCs. Will continue to watch her kidney function but for now do not think that SLED provided any benefit    -  Remains to have UO adequate with current diuresis, avoid aggressive diuresis as she also has ostomy output yesterday . In the last 24 hours her output was 2100.   - Replete her magnesium as it remains low   -Modified renal diet : decrease in protein , normal intake of K and phos   - Start lasix 40 mg PO BID , she remains to have significant bilateral pitting edema.  - avoid nephrotoxic drugs, ACEI/ARB, NSAIDS  - adjust drugs to GFR  - Agree with fluid restriction of 1500 cc/day  - Acute RRT not indicated at this point                                    Thank you for your consult. I will follow-up with patient. Please contact us if you have any additional questions.    Vaishali Ji MD  Nephrology  [facility]

## 2018-10-24 NOTE — PT/OT/SLP PROGRESS
Occupational Therapy   Treatment    Name: Scarlett Reddy  MRN: [id number]  Admitting Diagnosis:  Liver transplanted  6 Days Post-Op    Recommendations:     Discharge Recommendations: home health OT  Discharge Equipment Recommendations:  walker, rolling  Barriers to discharge:  None    Subjective     Communicated with: RN prior to session. Family and  present throughout session.    Pain/Comfort:  · Pain Rating 1: 0/10  · Pain Rating Post-Intervention 1: 0/10    Patients cultural, spiritual, Congregational conflicts given the current situation: none reported    Objective:     Patient found with: telemetry, pulse ox (continuous), central line    General Precautions: Standard, fall, contact   Orthopedic Precautions:N/A   Braces: N/A     Occupational Performance:    Bed Mobility:    · NT, pt found seated UIC upon arrival    Functional Mobility/Transfers:  · Patient completed Sit <> Stand Transfer with minimum assistance  with  rolling walker   · Functional Mobility: Declined 2/2 fatigue from physical therapy    Activities of Daily Living:  · Lower Body Dressing: total assistance to casper B socks while seated UIC.    Patient left up in chair with all lines intact, call button in reach, RN notified and family and  present    [facility] 6 Click:  [facility] Total Score: 17    Treatment & Education:  -Pt edu on OT role/POC  -Importance of OOB activity with staff assistance ( UIC during each meal)  -Safety during functional t/f and mobility   -Communication board updated  - Self care tasks completed-- assistance level noted above  - Pt completed BUE/BLE AROM exercises while seated UIC including: 1x15 reps in shoulder flexion, elbow flexion/extension, chest press, scap squeezing, and froward/backward shoulder rolls; hip flexion, knee flexion/extension, ankle pumps. Pt tolerated well with min rest breaks between each exercise.   - Provided education on neck and thoracic stretches ( gentle AROM within available planes of  motion)-- pt demo'd understanding    Assessment:     Scarlett Reddy is a 68 y.o. female with a medical diagnosis of Liver transplanted.  She presents alert and motivated to participate with therapy however limited 2/2 fatigue from physical therapy.  Performance deficits affecting function are weakness, impaired endurance, impaired self care skills, impaired functional mobilty, gait instability, impaired balance, impaired cardiopulmonary response to activity, decreased lower extremity function, edema. Pt would benefit from HHOT following discharge to continue to progress towards goals and improve quality of life.     Rehab Prognosis:  Good; patient would benefit from acute skilled OT services to address these deficits and reach maximum level of function.       Plan:     Patient to be seen 4 x/week to address the above listed problems via self-care/home management, therapeutic activities, therapeutic exercises, neuromuscular re-education  · Plan of Care Expires: 11/07/18  · Plan of Care Reviewed with: patient, daughter    This Plan of care has been discussed with the patient who was involved in its development and understands and is in agreement with the identified goals and treatment plan    GOALS:   Multidisciplinary Problems     Occupational Therapy Goals        Problem: Occupational Therapy Goal    Goal Priority Disciplines Outcome Interventions   Occupational Therapy Goal     OT, PT/OT Ongoing (interventions implemented as appropriate)    Description:  Goals to be met by: 11/7/18 ( revised 10/24/18)    Patient will increase functional independence with ADLs by performing:    Feeding with [place].  UE Dressing with Supervision.  LE Dressing with Supervision.  Grooming while standing at sink with Supervision.  Toileting from toilet with Supervision for hygiene and clothing management.   Toilet transfer to toilet with Supervision.                       Time Tracking:     OT Date of Treatment: 10/24/18  OT Start  Time: 1115  OT Stop Time: 1141  OT Total Time (min): 26 min    Billable Minutes:Therapeutic Exercise 26    Laura corado OT  10/24/2018

## 2018-10-24 NOTE — ASSESSMENT & PLAN NOTE
- Fluctuating H/H.    -Transfuse 10/14 and 10/16 (1 unit); 10/18 (2 units); 10/19 (1 unit), 10/22 (1 unit)  -Decreased platelets -- 1 u plt given 10/18   -DDAVP given 10/18 and 10/19  -FOBT +  -LDH elevated, Hapto < 10  -EGD 10/18: ulcerated duodenal mucosa   -Monitoring w/ CBC q12hr to assess the need for transfusion

## 2018-10-25 LAB
ABO + RH BLD: NORMAL
ALBUMIN SERPL BCP-MCNC: 3.1 G/DL
ALP SERPL-CCNC: 95 U/L
ALT SERPL W/O P-5'-P-CCNC: 17 U/L
ANION GAP SERPL CALC-SCNC: 10 MMOL/L
APTT BLDCRRT: 25.2 SEC
AST SERPL-CCNC: 18 U/L
BASOPHILS # BLD AUTO: 0 K/UL
BASOPHILS # BLD AUTO: 0.01 K/UL
BASOPHILS NFR BLD: 0 %
BASOPHILS NFR BLD: 0.2 %
BILIRUB SERPL-MCNC: 3 MG/DL
BLD GP AB SCN CELLS X3 SERPL QL: NORMAL
BUN SERPL-MCNC: 96 MG/DL
CALCIUM SERPL-MCNC: 8.9 MG/DL
CHLORIDE SERPL-SCNC: 97 MMOL/L
CO2 SERPL-SCNC: 27 MMOL/L
CREAT SERPL-MCNC: 2.1 MG/DL
DIFFERENTIAL METHOD: ABNORMAL
DIFFERENTIAL METHOD: ABNORMAL
EOSINOPHIL # BLD AUTO: 0 K/UL
EOSINOPHIL # BLD AUTO: 0.1 K/UL
EOSINOPHIL NFR BLD: 0.2 %
EOSINOPHIL NFR BLD: 1.2 %
ERYTHROCYTE [DISTWIDTH] IN BLOOD BY AUTOMATED COUNT: 19.8 %
ERYTHROCYTE [DISTWIDTH] IN BLOOD BY AUTOMATED COUNT: 20.1 %
EST. GFR  (AFRICAN AMERICAN): 27.1 ML/MIN/1.73 M^2
EST. GFR  (NON AFRICAN AMERICAN): 23.5 ML/MIN/1.73 M^2
GLUCOSE SERPL-MCNC: 145 MG/DL
HCT VFR BLD AUTO: 24.2 %
HCT VFR BLD AUTO: 25.8 %
HGB BLD-MCNC: 7.7 G/DL
HGB BLD-MCNC: 8.4 G/DL
IMM GRANULOCYTES # BLD AUTO: 0.03 K/UL
IMM GRANULOCYTES # BLD AUTO: 0.06 K/UL
IMM GRANULOCYTES NFR BLD AUTO: 0.7 %
IMM GRANULOCYTES NFR BLD AUTO: 1 %
INR PPP: 1.1
LYMPHOCYTES # BLD AUTO: 0.1 K/UL
LYMPHOCYTES # BLD AUTO: 0.2 K/UL
LYMPHOCYTES NFR BLD: 1.6 %
LYMPHOCYTES NFR BLD: 5 %
MAGNESIUM SERPL-MCNC: 1.6 MG/DL
MCH RBC QN AUTO: 28.9 PG
MCH RBC QN AUTO: 29.1 PG
MCHC RBC AUTO-ENTMCNC: 31.8 G/DL
MCHC RBC AUTO-ENTMCNC: 32.6 G/DL
MCV RBC AUTO: 89 FL
MCV RBC AUTO: 91 FL
MONOCYTES # BLD AUTO: 0.2 K/UL
MONOCYTES # BLD AUTO: 0.2 K/UL
MONOCYTES NFR BLD: 3.3 %
MONOCYTES NFR BLD: 4.1 %
NEUTROPHILS # BLD AUTO: 3.7 K/UL
NEUTROPHILS # BLD AUTO: 5.8 K/UL
NEUTROPHILS NFR BLD: 88.8 %
NEUTROPHILS NFR BLD: 93.9 %
NRBC BLD-RTO: 0 /100 WBC
NRBC BLD-RTO: 0 /100 WBC
PHOSPHATE SERPL-MCNC: 3.8 MG/DL
PLATELET # BLD AUTO: 43 K/UL
PLATELET # BLD AUTO: 65 K/UL
PMV BLD AUTO: 10 FL
PMV BLD AUTO: 11.5 FL
POCT GLUCOSE: 177 MG/DL (ref 70–110)
POCT GLUCOSE: 235 MG/DL (ref 70–110)
POTASSIUM SERPL-SCNC: 3.7 MMOL/L
PROT SERPL-MCNC: 4.2 G/DL
PROTHROMBIN TIME: 11.1 SEC
RBC # BLD AUTO: 2.66 M/UL
RBC # BLD AUTO: 2.89 M/UL
SODIUM SERPL-SCNC: 134 MMOL/L
TACROLIMUS BLD-MCNC: 3.4 NG/ML
WBC # BLD AUTO: 4.19 K/UL
WBC # BLD AUTO: 6.12 K/UL

## 2018-10-25 PROCEDURE — 25000003 PHARM REV CODE 250: Performed by: PHYSICIAN ASSISTANT

## 2018-10-25 PROCEDURE — 63600175 PHARM REV CODE 636 W HCPCS: Performed by: PHYSICIAN ASSISTANT

## 2018-10-25 PROCEDURE — 94761 N-INVAS EAR/PLS OXIMETRY MLT: CPT

## 2018-10-25 PROCEDURE — 25000003 PHARM REV CODE 250: Performed by: NURSE PRACTITIONER

## 2018-10-25 PROCEDURE — 25000003 PHARM REV CODE 250: Performed by: STUDENT IN AN ORGANIZED HEALTH CARE EDUCATION/TRAINING PROGRAM

## 2018-10-25 PROCEDURE — 83735 ASSAY OF MAGNESIUM: CPT

## 2018-10-25 PROCEDURE — 85730 THROMBOPLASTIN TIME PARTIAL: CPT

## 2018-10-25 PROCEDURE — 99233 SBSQ HOSP IP/OBS HIGH 50: CPT | Mod: 24,,, | Performed by: PHYSICIAN ASSISTANT

## 2018-10-25 PROCEDURE — 20600001 HC STEP DOWN PRIVATE ROOM

## 2018-10-25 PROCEDURE — 86920 COMPATIBILITY TEST SPIN: CPT

## 2018-10-25 PROCEDURE — 85610 PROTHROMBIN TIME: CPT

## 2018-10-25 PROCEDURE — 86850 RBC ANTIBODY SCREEN: CPT

## 2018-10-25 PROCEDURE — 80197 ASSAY OF TACROLIMUS: CPT

## 2018-10-25 PROCEDURE — 99232 SBSQ HOSP IP/OBS MODERATE 35: CPT | Mod: ,,, | Performed by: INTERNAL MEDICINE

## 2018-10-25 PROCEDURE — 85025 COMPLETE CBC W/AUTO DIFF WBC: CPT

## 2018-10-25 PROCEDURE — 84100 ASSAY OF PHOSPHORUS: CPT

## 2018-10-25 PROCEDURE — 80053 COMPREHEN METABOLIC PANEL: CPT

## 2018-10-25 PROCEDURE — C9113 INJ PANTOPRAZOLE SODIUM, VIA: HCPCS | Performed by: PHYSICIAN ASSISTANT

## 2018-10-25 PROCEDURE — 25000003 PHARM REV CODE 250: Performed by: TRANSPLANT SURGERY

## 2018-10-25 PROCEDURE — 94664 DEMO&/EVAL PT USE INHALER: CPT

## 2018-10-25 PROCEDURE — 63600175 PHARM REV CODE 636 W HCPCS: Mod: JG | Performed by: PHYSICIAN ASSISTANT

## 2018-10-25 PROCEDURE — 94799 UNLISTED PULMONARY SVC/PX: CPT

## 2018-10-25 PROCEDURE — 99900035 HC TECH TIME PER 15 MIN (STAT)

## 2018-10-25 PROCEDURE — 63600175 PHARM REV CODE 636 W HCPCS: Performed by: STUDENT IN AN ORGANIZED HEALTH CARE EDUCATION/TRAINING PROGRAM

## 2018-10-25 RX ORDER — FUROSEMIDE 10 MG/ML
40 INJECTION INTRAMUSCULAR; INTRAVENOUS 2 TIMES DAILY
Status: COMPLETED | OUTPATIENT
Start: 2018-10-25 | End: 2018-10-25

## 2018-10-25 RX ORDER — INSULIN ASPART 100 [IU]/ML
14 INJECTION, SOLUTION INTRAVENOUS; SUBCUTANEOUS
Status: DISCONTINUED | OUTPATIENT
Start: 2018-10-26 | End: 2018-10-29

## 2018-10-25 RX ADMIN — OXYCODONE HYDROCHLORIDE 5 MG: 5 TABLET ORAL at 10:10

## 2018-10-25 RX ADMIN — HEPATITIS B IMMUNE GLOBULIN (HUMAN) 9999.91 UNITS: 312 INJECTION, SOLUTION INTRAMUSCULAR; INTRAVENOUS at 03:10

## 2018-10-25 RX ADMIN — MIRTAZAPINE 7.5 MG: 7.5 TABLET ORAL at 09:10

## 2018-10-25 RX ADMIN — MYCOPHENOLATE MOFETIL 1000 MG: 250 CAPSULE ORAL at 08:10

## 2018-10-25 RX ADMIN — MAGNESIUM OXIDE TAB 400 MG (241.3 MG ELEMENTAL MG) 800 MG: 400 (241.3 MG) TAB at 08:10

## 2018-10-25 RX ADMIN — FUROSEMIDE 40 MG: 10 INJECTION, SOLUTION INTRAMUSCULAR; INTRAVENOUS at 07:10

## 2018-10-25 RX ADMIN — MIDODRINE HYDROCHLORIDE 15 MG: 5 TABLET ORAL at 08:10

## 2018-10-25 RX ADMIN — INSULIN ASPART 12 UNITS: 100 INJECTION, SOLUTION INTRAVENOUS; SUBCUTANEOUS at 02:10

## 2018-10-25 RX ADMIN — MIDODRINE HYDROCHLORIDE 15 MG: 5 TABLET ORAL at 02:10

## 2018-10-25 RX ADMIN — MIDODRINE HYDROCHLORIDE 15 MG: 5 TABLET ORAL at 09:10

## 2018-10-25 RX ADMIN — TACROLIMUS 0.5 MG: 0.5 CAPSULE ORAL at 07:10

## 2018-10-25 RX ADMIN — VALGANCICLOVIR 450 MG: 450 TABLET, FILM COATED ORAL at 08:10

## 2018-10-25 RX ADMIN — LEVOFLOXACIN 250 MG: 250 TABLET, FILM COATED ORAL at 08:10

## 2018-10-25 RX ADMIN — URSODIOL 300 MG: 300 CAPSULE ORAL at 08:10

## 2018-10-25 RX ADMIN — FLUCONAZOLE 200 MG: 200 TABLET ORAL at 08:10

## 2018-10-25 RX ADMIN — BISACODYL 10 MG: 5 TABLET, COATED ORAL at 03:10

## 2018-10-25 RX ADMIN — URSODIOL 300 MG: 300 CAPSULE ORAL at 09:10

## 2018-10-25 RX ADMIN — INSULIN ASPART 14 UNITS: 100 INJECTION, SOLUTION INTRAVENOUS; SUBCUTANEOUS at 07:10

## 2018-10-25 RX ADMIN — FUROSEMIDE 40 MG: 10 INJECTION, SOLUTION INTRAMUSCULAR; INTRAVENOUS at 12:10

## 2018-10-25 RX ADMIN — INSULIN ASPART 2 UNITS: 100 INJECTION, SOLUTION INTRAVENOUS; SUBCUTANEOUS at 02:10

## 2018-10-25 RX ADMIN — INSULIN ASPART 12 UNITS: 100 INJECTION, SOLUTION INTRAVENOUS; SUBCUTANEOUS at 08:10

## 2018-10-25 RX ADMIN — PANTOPRAZOLE SODIUM 40 MG: 40 INJECTION, POWDER, FOR SOLUTION INTRAVENOUS at 09:10

## 2018-10-25 RX ADMIN — AMOXICILLIN 500 MG: 500 CAPSULE ORAL at 08:10

## 2018-10-25 RX ADMIN — PREDNISONE 10 MG: 10 TABLET ORAL at 08:10

## 2018-10-25 RX ADMIN — PANTOPRAZOLE SODIUM 40 MG: 40 INJECTION, POWDER, FOR SOLUTION INTRAVENOUS at 08:10

## 2018-10-25 RX ADMIN — MAGNESIUM OXIDE TAB 400 MG (241.3 MG ELEMENTAL MG) 800 MG: 400 (241.3 MG) TAB at 09:10

## 2018-10-25 RX ADMIN — AMOXICILLIN 500 MG: 500 CAPSULE ORAL at 09:10

## 2018-10-25 RX ADMIN — BISACODYL 10 MG: 10 SUPPOSITORY RECTAL at 09:10

## 2018-10-25 RX ADMIN — MYCOPHENOLATE MOFETIL 1000 MG: 250 CAPSULE ORAL at 09:10

## 2018-10-25 NOTE — PROGRESS NOTES
[facility]  Liver Transplant  Progress Note    Patient Name: Scarlett Reddy  MRN: [id number]  Admission Date: 10/1/2018  Hospital Length of Stay: 23 days  Code Status: Full Code  Primary Care Provider: Primary Doctor No  Post-Operative Day: 20    ORGAN:   LIVER  Disease Etiology: Cirrhosis: Type B and D  Donor Type:    - Brain Death  CDC High Risk:   No  Donor CMV Status:   Donor CMV Status: Positive  Donor HBcAB:   Negative  Donor HCV Status:   Negative  Donor HBV SETH: Negative  Donor HCV SETH: Negative  Whole or Partial: Whole Liver  Biliary Anastomosis: End to End  Arterial Anatomy: Replaced Left Hepatic and Right Hepatic  Subjective:     History of Present Illness:  Ms. Reddy is a 69 y/o female with PMH of ESLD secondary Hep B and D.  Listed for liver transplant with MELD 23.  Paracentesis 10/1 with 5L removed, no fluid sent for analysis.  Morning labs significant for hyponatremia, Na 119.  Pt admitted to LTS for sodium monitoring.        Hospital Course:  Hospital Course: 69 y/o F h/o HBV/delta cirrhosis s/p DBDLT 10/5/18 (CMV D+/R+, steroid induction, MMF/tacro/pred maintenance) with take back on 10/6 2/2 hyperbilirubinema and bilious VALORIE drainage, no leak identified. Post-op course c/b hypercapneic and hypoxic respiratory failure and was reintubated though quickly extubated now doing well. Liver bx (10/6) sig for Zone 3 hemorrhage and collapse, in addition to cholestasis. Transferred from ICU on POD #4. ID consulted to comment on positive diphtheroid culture from ascitic fluid (likely skin colonization) as well as ESBL Klebsiella pneumoniae in urine culture (10/4).  Pt asymptomatic and cell count from ascitic fluid unremarkable. Per ID recs, no need to treat diphtheroids or asymptomatic ESBL Kleb pneumo bacteriuria though pt has had 6 days of therapy which would cover these organisms. Mild peritransplant ascites- repeat US 10/6 with increased arterial resistive indices. Repeat US 10/9  w/ continued elevation in RIs and fluid collections. LFTs trending down nicely.     Acute gradual worsening of renal function s/p OLT.Creatinine on arrival 0.8 and has been up trending. Nephrology consulted for post-op EVA. Remains on renal/low K diet. Patient continued to be diuresed with lasix. BUN and Cr remain elevated requiring multiple rounds of dialysis (10/15, 10/17, 10/20). Additionally, pt w/ anemia requiring multiple blood transfusions (10/14, 10/16, 10/18, 10/19, 10/22). Will cont close monitoring H/H, transfuse as needed with goal Hb > 7.0. Decreased platelets, improvements w/ 2 u plts (10/18, 10/19) and DDAVP.   Pt reported dark stools 10/15 but color has normalized. No blood noted on MORELIA. FOBT+. EGD 10/18 consistent with ulcerated mucosa in duodenum s/p coagulation. Biopsy pending. Concern for H. Pylori. Started Amoxicillin/levofloxacin (10/19). Remains on Protonix 40 mg BID.   Of note, Urine cx + Klebsiella pneumoniae (10/13). Received 3 day course of ciprofloxacin, dc'd 10/19.   On 10/19 pm pt c/o crushing chest pain- cardiac workup initiated. Troponin 0.094, likely 2/2 ischemic stress. EKG NSR.     Interval history: Pt doing ok this morning. Still c/o pain and tenderness across abdomen likely 2/2 excess fluid. H/H stable but cont to drop (8.5--> 7.7).  No blood given today. Will reassess in the morning.  Cr (2.1). Lasix 40mg x 2. Will cont to reassess the need for diuresis on a daily basis. Pt remains off heparin due to possible ongoing GI bleed. Pt to wear compression stockings at this time. Pt eating well. No further complaints at this time.    Scheduled Meds:   amoxicillin  500 mg Oral Q12H    entecavir  0.5 mg Oral Q72H    ergocalciferol  50,000 Units Oral Q7 Days    fluconazole  200 mg Oral Daily    furosemide  40 mg Intravenous BID    hepatitis B immune globulin (HEPA-RALPH B) IVPB  9,999.912 Units Intravenous Once    insulin aspart U-100  12 Units Subcutaneous TIDWM    levoFLOXacin   250 mg Oral Daily    magnesium oxide  800 mg Oral BID    midodrine  15 mg Oral TID    mirtazapine  7.5 mg Oral QHS    mycophenolate  1,000 mg Oral BID    pantoprazole  40 mg Intravenous BID    predniSONE  10 mg Oral Daily    Followed by    [START ON 10/31/2018] predniSONE  5 mg Oral Daily    Followed by    [START ON 11/7/2018] predniSONE  2.5 mg Oral Daily    sulfamethoxazole-trimethoprim 400-80mg  1 tablet Oral Every Mon, Wed, Fri    tacrolimus  0.5 mg Oral Daily    ursodiol  300 mg Oral BID    valGANciclovir  450 mg Oral Twice Weekly     Continuous Infusions:  PRN Meds:bisacodyl, bisacodyl, calcium carbonate, dextrose 50%, dextrose 50%, docusate sodium, glucagon (human recombinant), glucose, glucose, insulin aspart U-100, ondansetron, ondansetron, oxyCODONE, oxyCODONE, polyethylene glycol, promethazine (PHENERGAN) IVPB, sodium chloride 0.9%    Review of Systems   Constitutional: Positive for activity change, appetite change and fatigue. Negative for fever.   Respiratory: Negative for cough and shortness of breath.    Cardiovascular: Positive for leg swelling. Negative for chest pain.   Gastrointestinal: Positive for abdominal distention and abdominal pain. Negative for constipation, diarrhea and vomiting.   Genitourinary: Negative for decreased urine volume, difficulty urinating and dysuria.   Musculoskeletal: Negative for arthralgias and back pain.   Allergic/Immunologic: Positive for immunocompromised state.   Neurological: Positive for weakness. Negative for tremors and headaches.     Objective:     Vital Signs (Most Recent):  Temp: 98.5 °F (36.9 °C) (10/25/18 1527)  Pulse: 105 (10/25/18 1300)  Resp: 15 (10/25/18 1300)  BP: 129/79 (10/25/18 1140)  SpO2: 98 % (10/25/18 1140) Vital Signs (24h Range):  Temp:  [97.5 °F (36.4 °C)-98.5 °F (36.9 °C)] 98.5 °F (36.9 °C)  Pulse:  [] 105  Resp:  [10-23] 15  SpO2:  [97 %-99 %] 98 %  BP: (102-139)/(71-85) 129/79     Weight: 76.8 kg (169 lb 6.4 oz)  Body  mass index is 30.01 kg/m².    Intake/Output - Last 3 Shifts       10/23 0700 - 10/24 0659 10/24 0700 - 10/25 0659 10/25 0700 - 10/26 0659    P.O. 720 1020 180    Total Intake(mL/kg) 720 (10) 1020 (13.3) 180 (2.3)    Urine (mL/kg/hr) 975 (0.6) 1550 (0.8) 575 (0.8)    Other       Stool 0 0 0    Total Output 975 1550 575    Net -255 -530 -395           Urine Occurrence 2 x 0 x 0 x    Stool Occurrence 3 x 2 x 0 x          Physical Exam   Constitutional: She is oriented to person, place, and time. No distress.   Temporal and distal extremity muscle wasting   Eyes: EOM are normal. Pupils are equal, round, and reactive to light. Scleral icterus is present.   Neck: Normal range of motion.   Cardiovascular: Normal rate and regular rhythm.   No murmur heard.  Pulmonary/Chest: Effort normal and breath sounds normal. No respiratory distress. She has no wheezes.   Abdominal: Soft. Bowel sounds are normal. She exhibits distension. There is tenderness.   Chevron incision w/ staples, CDI   Musculoskeletal: Normal range of motion. She exhibits edema (3+ BLE).   Neurological: She is alert and oriented to person, place, and time.   Skin: Skin is warm and dry.   Nursing note and vitals reviewed.      Laboratory:  Immunosuppressants         Stop Route Frequency     tacrolimus capsule 0.5 mg      -- Oral Daily     mycophenolate capsule 1,000 mg      -- Oral 2 times daily        CBC:   Recent Labs   Lab 10/25/18  0530   WBC 4.19   RBC 2.66*   HGB 7.7*   HCT 24.2*   PLT 43*   MCV 91   MCH 28.9   MCHC 31.8*     CMP:   Recent Labs   Lab 10/25/18  0530   *   CALCIUM 8.9   ALBUMIN 3.1*   PROT 4.2*   *   K 3.7   CO2 27   CL 97   BUN 96*   CREATININE 2.1*   ALKPHOS 95   ALT 17   AST 18   BILITOT 3.0*     Labs within the past 24 hours have been reviewed.    Diagnostic Results:  I have personally reviewed all pertinent imaging studies.    Assessment/Plan:     * Liver transplanted    -pmhx of  ESLD secondary to hep B cirrhosis, now s/p  liver transplant on 10/5, with takeback for hyperbilirubinemia and bilious VALORIE output 10/6; no biliary leak identified.   -POD 1 US: increased arterial resistive indices, suggestive of edema vs cute rejection vs congestion  -Repeat US 10/9 with continued elevation of RIs  -LFTs trending down  -T bili trending down  -repeat liver US (10/11): elevated RIs  - LFTs normalizing            Duodenal ulcer    EGD 10/18- sig for ulcerated duodenal mucosa.   Biopsy pending  Cont protonix 40 BID  Started H. Pylori tx 10/19: Amoxicillin/Levo. Remains on PPI       UTI (urinary tract infection)    Urine Cx 10/15 w/ Klebsiella pneumonia  Started Ciprofloxacin 10/17. Dc'd 10/19. Coverage w/ h. pylori treatment  Pt asymptomatic at this time       Acute blood loss anemia    H/H cont to fluctuate.   FOBT +  EGD 10/18- showed diffuse bleeding with ulcerated duodenal mucosa   Monitoring w/ CBC q 12 to assess the need for transfusion            Post LT - Acute renal failure    - Creatinine on arrival 0.8 and trended up  -BUN/cr (10/11/18): 91/1.9   -Tacrolimus 26.9 (10/08)  -Nephrology consulted  -Renal US (10/11): no hydronephrosis  -Echo 10/12: diastolic dysfunction, likely 2/2 to ARF  - pt on renal diet w 1500 ml FR.  -CRRT 10/14; SLED 10/17, 10/20  -Repeat Liver US 10/22- decreased velocities. Will monitor      Hypervolemia associated with renal insufficiency    - Diuresed w/ Lasix 100mg x 1 (10/12).  - CRRT 10/14.  .    -SLED 10/17, 10/20  -Diuresing w/ Lasix 80 BID-- stopped (10/23)  -Will assess the need for diuresis on a daily basis  -today: Lasix 40mg x 2       EVA (acute kidney injury)           Metabolic acidosis    - Started Na bicarb 1300 QID.  -CRRT 10/14  -SLED 10/17, 10/20         At risk for opportunistic infections    - Valcyte for CMV prophylaxis  - Bactrim for PCP prophylaxis (MWF)  - Fluconazole for fungal prophylaxis        Long-term use of immunosuppressant medication    -  Cellcept/Prograf/pred  -  Held  prograf 10/8 for elevated levels  -  Resumed Prograf 10/10/19. Will adjust as needed.   -  Will monitor for signs of toxic side effects, check daily tacrolimus troughs, and change meds accordingly.       Prophylactic immunotherapy    - See long term use immunosuppresion       Ascites    - paracentesis 10/1, 5L removed, no fluid sent for analysis.  - diuresing with albumin and lasix 10/9.  - Per Nephrology, on 10/13- Lasix 80 mg bid and monitor.  No need for RRT.  -  10/14, pt w worsening edema.          Weakness    - see physical decond.       Physical deconditioning    - PT/OT consulted.  - Recommend home health PT and rolling walker at discharge        Hyponatremia    - Na 119 on admit.  -Na was improving post transplant w HD.  - Na stable at this time. Monitoring w/ daily labs            Hypotension    - continue Midodrine 15 mg TID.   Monitor.       Anemia requiring transfusions    - Fluctuating H/H.    -Transfuse 10/14 and 10/16 (1 unit); 10/18 (2 units); 10/19 (1 unit), 10/22 (1 unit)  -Decreased platelets -- 1 u plt given 10/18   -DDAVP given 10/18 and 10/19  -FOBT +  -LDH elevated, Hapto < 10  -EGD 10/18: ulcerated duodenal mucosa   -Monitoring w/ CBC q12hr to assess the need for transfusion        Severe malnutrition    - dietary consulted.   - temporal and distal extremity muscle wasting and edema  - encourage supplements and to increase nutritional intake   - pt on renal/low K+ and low fat diet     Type 2 diabetes mellitus with diabetic polyneuropathy, with long-term current use of insulin    - continue home regimen.  - endocrine consulted.  -Pt off insulin drip at this time.  Apprec endo recs.        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  -Tx w/ Entecavir- dose changed to q72 hours given renal function.             VTE Risk Mitigation (From admission, onward)        Ordered     Place sequential compression device  Until discontinued      10/05/18 0709           The patients clinical status was discussed at multidisplinary rounds, involving transplant surgery, transplant medicine, pharmacy, nursing, nutrition, and social work    Discharge Planning:  No Patient Care Coordination Note on file.      Jihan Soares PA-C  Liver Transplant  [facility]

## 2018-10-25 NOTE — SUBJECTIVE & OBJECTIVE
Interval History: No acute events overnight. She remains to have good urine output but has decrease in hgb again. Noticed increase in urine output with lasix.  Creatinine  today 2.1 stable from yesterday.  Last Sled was 10/20 with no improvement in kidney function      Review of patient's allergies indicates:  No Known Allergies  Current Facility-Administered Medications   Medication Frequency    amoxicillin capsule 500 mg Q12H    bisacodyl EC tablet 10 mg Daily PRN    bisacodyl suppository 10 mg Daily PRN    calcium carbonate 200 mg calcium (500 mg) chewable tablet 500 mg TID PRN    dextrose 50% injection 12.5 g PRN    dextrose 50% injection 25 g PRN    docusate sodium capsule 100 mg TID PRN    entecavir tablet 0.5 mg Q72H    ergocalciferol capsule 50,000 Units Q7 Days    fluconazole tablet 200 mg Daily    furosemide injection 40 mg BID    glucagon (human recombinant) injection 1 mg PRN    glucose chewable tablet 16 g PRN    glucose chewable tablet 24 g PRN    hepatitis B immun glob-maltose (Hepagam B) greatr than 312 unit/mL (5 mL) 9,999.912 Units in sodium chloride 0.9% 250 mL Once    insulin aspart U-100 pen 0-5 Units QID (AC + HS) PRN    insulin aspart U-100 pen 12 Units TIDWM    levoFLOXacin tablet 250 mg Daily    magnesium oxide tablet 800 mg BID    midodrine tablet 15 mg TID    mirtazapine tablet 7.5 mg QHS    mycophenolate capsule 1,000 mg BID    ondansetron disintegrating tablet 8 mg Q8H PRN    ondansetron injection 4 mg Q8H PRN    oxyCODONE immediate release tablet 5 mg Q4H PRN    oxyCODONE immediate release tablet Tab 10 mg Q4H PRN    pantoprazole injection 40 mg BID    polyethylene glycol packet 17 g Daily PRN    predniSONE tablet 10 mg Daily    Followed by    [START ON 10/31/2018] predniSONE tablet 5 mg Daily    Followed by    [START ON 11/7/2018] predniSONE tablet 2.5 mg Daily    promethazine (PHENERGAN) 6.25 mg in dextrose 5 % 50 mL IVPB Q6H PRN    sodium chloride  0.9% flush 3 mL PRN    sulfamethoxazole-trimethoprim 400-80mg per tablet 1 tablet Every Mon, Wed, Fri    tacrolimus capsule 0.5 mg Daily    ursodiol capsule 300 mg BID    valGANciclovir tablet 450 mg Twice Weekly       Objective:     Vital Signs (Most Recent):  Temp: 97.5 °F (36.4 °C) (10/25/18 1137)  Pulse: 105 (10/25/18 1300)  Resp: 15 (10/25/18 1300)  BP: 129/79 (10/25/18 1140)  SpO2: 98 % (10/25/18 1140)  O2 Device (Oxygen Therapy): room air (10/25/18 1300) Vital Signs (24h Range):  Temp:  [97.5 °F (36.4 °C)-98.1 °F (36.7 °C)] 97.5 °F (36.4 °C)  Pulse:  [] 105  Resp:  [10-23] 15  SpO2:  [97 %-99 %] 98 %  BP: (102-139)/(71-85) 129/79     Weight: 76.8 kg (169 lb 6.4 oz) (10/25/18 0500)  Body mass index is 30.01 kg/m².  Body surface area is 1.85 meters squared.    I/O last 3 completed shifts:  In: 1200 [P.O.:1200]  Out: 1925 [Urine:1925]    Physical Exam   Constitutional: She is oriented to person, place, and time.   Temporal and distal extremity muscle wasting   Eyes: EOM are normal. Pupils are equal, round, and reactive to light. Scleral icterus is present.   Neck: Normal range of motion.   Cardiovascular: Normal rate and regular rhythm.   No murmur heard.  Pulmonary/Chest: Effort normal and breath sounds normal. No respiratory distress. She has no wheezes.   Abdominal: Soft. Bowel sounds are normal. She exhibits distension. There is tenderness. There is no guarding.   Musculoskeletal: Normal range of motion. She exhibits edema (3+ BLE).   Neurological: She is alert and oriented to person, place, and time.   Skin: Skin is warm and dry.   Nursing note and vitals reviewed.      Significant Labs:  CBC:   Recent Labs   Lab 10/25/18  0530   WBC 4.19   RBC 2.66*   HGB 7.7*   HCT 24.2*   PLT 43*   MCV 91   MCH 28.9   MCHC 31.8*     CMP:   Recent Labs   Lab 10/25/18  0530   *   CALCIUM 8.9   ALBUMIN 3.1*   PROT 4.2*   *   K 3.7   CO2 27   CL 97   BUN 96*   CREATININE 2.1*   ALKPHOS 95   ALT 17    AST 18   BILITOT 3.0*     All labs within the past 24 hours have been reviewed.     Significant Imaging:  Labs: Reviewed

## 2018-10-25 NOTE — PROGRESS NOTES
This  (TIMBO) presented to the patients room.  The pt was observed to be laying in bed accompanied by her .  At this time Dunia () - [phone] was not available and will present to the pts room shortly.  TIMBO will continue to follow. SW remains available.

## 2018-10-25 NOTE — ASSESSMENT & PLAN NOTE
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      - acute gradual worsening of renal function s/p OLT  - 10/06 was taken to the OR for possibility of billiary leak and post op complication due to hypercapnic hypoxic respiratory failure, no blood pressure drop intraoperatively per anesthesiology notes. Patient has not been needing vasopressors to maintain BP  - Creatinine on arrival 0.8 and has been up trending   - BUN/cr (10/11/18): 91/1.9; (10/12/18): 100/2.0; (10/13/18): 110/2.4  - Tacrolimus 26.9 (10/08); 6.8 (10/12); 5.6 (10/13), remains in none nephrotoxic levels   - Protein Creatine ratio: 0.82  - UA consistent with hematuria, proteinuria, RBC and amorphous casts   - Fena 0.5%; consistent with hypovolemia   - Renal US negative for obstruction, stricture or, hydronephrosis    Assessment: Acute Renal failure in setting of hepatic ischemia reperfusion injury and poor perfusion status vs acute tubular necrosis from immunosuppressive agents      Plan:  -SLED for uremic toxin clearance was perfromed over the weekend for uremic platelet dysfunction. She remained to be anemic and still required PRBCs. Will continue to watch her kidney function but for now do not think that SLED provided any benefit    - Continue with lasix at patient needs diuresis, defer to primary team for dosing, can give albumin to augment lasix  - Replete her magnesium as it remains low   -Modified renal diet : decrease in protein , normal intake of K and phos   - avoid nephrotoxic drugs, ACEI/ARB, NSAIDS  - adjust drugs to GFR  - Agree with fluid restriction of 1500 cc/day  - Acute RRT not indicated at this point

## 2018-10-25 NOTE — CARE UPDATE
BG goal 140-180.       FBG at goal without Levemir.   Increase novolog 14 units with meals.   Bg monitoring ac/hs and low dose correction scale.       ** Please call Endocrine for any BG related issues **

## 2018-10-25 NOTE — ASSESSMENT & PLAN NOTE
- Creatinine on arrival 0.8 and trended up  -BUN/cr (10/11/18): 91/1.9   -Tacrolimus 26.9 (10/08)  -Nephrology consulted  -Renal US (10/11): no hydronephrosis  -Echo 10/12: diastolic dysfunction, likely 2/2 to ARF  - pt on renal diet w 1500 ml FR.  -CRRT 10/14; SLED 10/17, 10/20  -Repeat Liver US 10/22- decreased velocities. Will monitor

## 2018-10-25 NOTE — ASSESSMENT & PLAN NOTE
- Diuresed w/ Lasix 100mg x 1 (10/12).  - CRRT 10/14.  .    -SLED 10/17, 10/20  -Diuresing w/ Lasix 80 BID-- stopped (10/23)  -Will assess the need for diuresis on a daily basis  -today: Lasix 40mg x 2

## 2018-10-25 NOTE — PROGRESS NOTES
This  (SW) presented to patient (pt) for follow up and continuity of care post liver transplant.  The patient was observed to be sitting the chair bedside alert, oriented x4 and communicative.  The patient made appropriate eye contact when communicating with SW.  The pt was accompanied by her caregiver .  The pt and family speaks [place]ian. SW contacted [facility] # [phone] who reported she was enroute to the hospital room but was able to interpret via phone.  The pt reported she was doing well but is concerned about her health.   The daughter of the patient is currently at the hotel and will present later today and the  will leave to rest. The patient reported increasing depression and anxiety at this time.  Pt reported her daughter and family has been helping as best they can.  The pts family appears to be coping well at this time. SW provided emotional support, reflective listening and normalization.  No discharge plans identified at this time. Patient verbalized understanding and agreement with the information reviewed, social work availability, and how to access available resources if needed. SW remains available.

## 2018-10-25 NOTE — SUBJECTIVE & OBJECTIVE
Scheduled Meds:   amoxicillin  500 mg Oral Q12H    entecavir  0.5 mg Oral Q72H    ergocalciferol  50,000 Units Oral Q7 Days    fluconazole  200 mg Oral Daily    furosemide  40 mg Intravenous BID    hepatitis B immune globulin (HEPA-RALPH B) IVPB  9,999.912 Units Intravenous Once    insulin aspart U-100  12 Units Subcutaneous TIDWM    levoFLOXacin  250 mg Oral Daily    magnesium oxide  800 mg Oral BID    midodrine  15 mg Oral TID    mirtazapine  7.5 mg Oral QHS    mycophenolate  1,000 mg Oral BID    pantoprazole  40 mg Intravenous BID    predniSONE  10 mg Oral Daily    Followed by    [START ON 10/31/2018] predniSONE  5 mg Oral Daily    Followed by    [START ON 11/7/2018] predniSONE  2.5 mg Oral Daily    sulfamethoxazole-trimethoprim 400-80mg  1 tablet Oral Every Mon, Wed, Fri    tacrolimus  0.5 mg Oral Daily    ursodiol  300 mg Oral BID    valGANciclovir  450 mg Oral Twice Weekly     Continuous Infusions:  PRN Meds:bisacodyl, bisacodyl, calcium carbonate, dextrose 50%, dextrose 50%, docusate sodium, glucagon (human recombinant), glucose, glucose, insulin aspart U-100, ondansetron, ondansetron, oxyCODONE, oxyCODONE, polyethylene glycol, promethazine (PHENERGAN) IVPB, sodium chloride 0.9%    Review of Systems   Constitutional: Positive for activity change, appetite change and fatigue. Negative for fever.   Respiratory: Negative for cough and shortness of breath.    Cardiovascular: Positive for leg swelling. Negative for chest pain.   Gastrointestinal: Positive for abdominal distention and abdominal pain. Negative for constipation, diarrhea and vomiting.   Genitourinary: Negative for decreased urine volume, difficulty urinating and dysuria.   Musculoskeletal: Negative for arthralgias and back pain.   Allergic/Immunologic: Positive for immunocompromised state.   Neurological: Positive for weakness. Negative for tremors and headaches.     Objective:     Vital Signs (Most Recent):  Temp: 98.5 °F (36.9 °C)  (10/25/18 1527)  Pulse: 105 (10/25/18 1300)  Resp: 15 (10/25/18 1300)  BP: 129/79 (10/25/18 1140)  SpO2: 98 % (10/25/18 1140) Vital Signs (24h Range):  Temp:  [97.5 °F (36.4 °C)-98.5 °F (36.9 °C)] 98.5 °F (36.9 °C)  Pulse:  [] 105  Resp:  [10-23] 15  SpO2:  [97 %-99 %] 98 %  BP: (102-139)/(71-85) 129/79     Weight: 76.8 kg (169 lb 6.4 oz)  Body mass index is 30.01 kg/m².    Intake/Output - Last 3 Shifts       10/23 0700 - 10/24 0659 10/24 0700 - 10/25 0659 10/25 0700 - 10/26 0659    P.O. 720 1020 180    Total Intake(mL/kg) 720 (10) 1020 (13.3) 180 (2.3)    Urine (mL/kg/hr) 975 (0.6) 1550 (0.8) 575 (0.8)    Other       Stool 0 0 0    Total Output 975 1550 575    Net -255 -530 -395           Urine Occurrence 2 x 0 x 0 x    Stool Occurrence 3 x 2 x 0 x          Physical Exam   Constitutional: She is oriented to person, place, and time. No distress.   Temporal and distal extremity muscle wasting   Eyes: EOM are normal. Pupils are equal, round, and reactive to light. Scleral icterus is present.   Neck: Normal range of motion.   Cardiovascular: Normal rate and regular rhythm.   No murmur heard.  Pulmonary/Chest: Effort normal and breath sounds normal. No respiratory distress. She has no wheezes.   Abdominal: Soft. Bowel sounds are normal. She exhibits distension. There is tenderness.   Chevron incision w/ staples, CDI   Musculoskeletal: Normal range of motion. She exhibits edema (3+ BLE).   Neurological: She is alert and oriented to person, place, and time.   Skin: Skin is warm and dry.   Nursing note and vitals reviewed.      Laboratory:  Immunosuppressants         Stop Route Frequency     tacrolimus capsule 0.5 mg      -- Oral Daily     mycophenolate capsule 1,000 mg      -- Oral 2 times daily        CBC:   Recent Labs   Lab 10/25/18  0530   WBC 4.19   RBC 2.66*   HGB 7.7*   HCT 24.2*   PLT 43*   MCV 91   MCH 28.9   MCHC 31.8*     CMP:   Recent Labs   Lab 10/25/18  0530   *   CALCIUM 8.9   ALBUMIN 3.1*    PROT 4.2*   *   K 3.7   CO2 27   CL 97   BUN 96*   CREATININE 2.1*   ALKPHOS 95   ALT 17   AST 18   BILITOT 3.0*     Labs within the past 24 hours have been reviewed.    Diagnostic Results:  I have personally reviewed all pertinent imaging studies.

## 2018-10-25 NOTE — PLAN OF CARE
Problem: Patient Care Overview  Goal: Plan of Care Review  Outcome: Ongoing (interventions implemented as appropriate)  Pt has call bell in reach, non slip socks on, and bedrails up x2. Pt encouraged to wash hands. Pt has labs in am. Pt has accuchecks ac/hs. Pt has TEDS on.

## 2018-10-26 LAB
ALBUMIN SERPL BCP-MCNC: 3 G/DL
ALP SERPL-CCNC: 110 U/L
ALT SERPL W/O P-5'-P-CCNC: 21 U/L
ANION GAP SERPL CALC-SCNC: 13 MMOL/L
APTT BLDCRRT: 21.7 SEC
AST SERPL-CCNC: 23 U/L
BASOPHILS # BLD AUTO: 0.01 K/UL
BASOPHILS # BLD AUTO: 0.02 K/UL
BASOPHILS NFR BLD: 0.2 %
BASOPHILS NFR BLD: 0.3 %
BILIRUB SERPL-MCNC: 3.3 MG/DL
BUN SERPL-MCNC: 105 MG/DL
CALCIUM SERPL-MCNC: 9 MG/DL
CHLORIDE SERPL-SCNC: 95 MMOL/L
CO2 SERPL-SCNC: 25 MMOL/L
CREAT SERPL-MCNC: 2.2 MG/DL
DIFFERENTIAL METHOD: ABNORMAL
DIFFERENTIAL METHOD: ABNORMAL
EOSINOPHIL # BLD AUTO: 0 K/UL
EOSINOPHIL # BLD AUTO: 0.1 K/UL
EOSINOPHIL NFR BLD: 0.2 %
EOSINOPHIL NFR BLD: 1.2 %
ERYTHROCYTE [DISTWIDTH] IN BLOOD BY AUTOMATED COUNT: 20.4 %
ERYTHROCYTE [DISTWIDTH] IN BLOOD BY AUTOMATED COUNT: 20.6 %
EST. GFR  (AFRICAN AMERICAN): 25.6 ML/MIN/1.73 M^2
EST. GFR  (NON AFRICAN AMERICAN): 22.2 ML/MIN/1.73 M^2
GLUCOSE SERPL-MCNC: 143 MG/DL
HCT VFR BLD AUTO: 25.1 %
HCT VFR BLD AUTO: 25.5 %
HGB BLD-MCNC: 8.1 G/DL
HGB BLD-MCNC: 8.3 G/DL
IMM GRANULOCYTES # BLD AUTO: 0.04 K/UL
IMM GRANULOCYTES # BLD AUTO: 0.06 K/UL
IMM GRANULOCYTES NFR BLD AUTO: 0.6 %
IMM GRANULOCYTES NFR BLD AUTO: 1 %
INR PPP: 1
LYMPHOCYTES # BLD AUTO: 0.3 K/UL
LYMPHOCYTES # BLD AUTO: 0.5 K/UL
LYMPHOCYTES NFR BLD: 4.9 %
LYMPHOCYTES NFR BLD: 8 %
MAGNESIUM SERPL-MCNC: 1.6 MG/DL
MCH RBC QN AUTO: 29 PG
MCH RBC QN AUTO: 29.5 PG
MCHC RBC AUTO-ENTMCNC: 32.3 G/DL
MCHC RBC AUTO-ENTMCNC: 32.5 G/DL
MCV RBC AUTO: 90 FL
MCV RBC AUTO: 91 FL
MONOCYTES # BLD AUTO: 0.3 K/UL
MONOCYTES # BLD AUTO: 0.3 K/UL
MONOCYTES NFR BLD: 4.6 %
MONOCYTES NFR BLD: 5.5 %
NEUTROPHILS # BLD AUTO: 5 K/UL
NEUTROPHILS # BLD AUTO: 5.7 K/UL
NEUTROPHILS NFR BLD: 84 %
NEUTROPHILS NFR BLD: 89.5 %
NRBC BLD-RTO: 0 /100 WBC
NRBC BLD-RTO: 0 /100 WBC
PHOSPHATE SERPL-MCNC: 3.8 MG/DL
PLATELET # BLD AUTO: 79 K/UL
PLATELET # BLD AUTO: 89 K/UL
PMV BLD AUTO: 10.8 FL
PMV BLD AUTO: 11.2 FL
POCT GLUCOSE: 143 MG/DL (ref 70–110)
POCT GLUCOSE: 245 MG/DL (ref 70–110)
POTASSIUM SERPL-SCNC: 3.7 MMOL/L
PROT SERPL-MCNC: 4.5 G/DL
PROTHROMBIN TIME: 10.5 SEC
RBC # BLD AUTO: 2.79 M/UL
RBC # BLD AUTO: 2.81 M/UL
SODIUM SERPL-SCNC: 133 MMOL/L
TACROLIMUS BLD-MCNC: 3.3 NG/ML
WBC # BLD AUTO: 5.97 K/UL
WBC # BLD AUTO: 6.31 K/UL

## 2018-10-26 PROCEDURE — 97110 THERAPEUTIC EXERCISES: CPT

## 2018-10-26 PROCEDURE — 63600175 PHARM REV CODE 636 W HCPCS: Performed by: PHYSICIAN ASSISTANT

## 2018-10-26 PROCEDURE — 25000003 PHARM REV CODE 250: Performed by: TRANSPLANT SURGERY

## 2018-10-26 PROCEDURE — C9113 INJ PANTOPRAZOLE SODIUM, VIA: HCPCS | Performed by: PHYSICIAN ASSISTANT

## 2018-10-26 PROCEDURE — 85610 PROTHROMBIN TIME: CPT

## 2018-10-26 PROCEDURE — 97530 THERAPEUTIC ACTIVITIES: CPT

## 2018-10-26 PROCEDURE — 80053 COMPREHEN METABOLIC PANEL: CPT

## 2018-10-26 PROCEDURE — 99232 SBSQ HOSP IP/OBS MODERATE 35: CPT | Mod: ,,, | Performed by: NURSE PRACTITIONER

## 2018-10-26 PROCEDURE — 25000003 PHARM REV CODE 250: Performed by: STUDENT IN AN ORGANIZED HEALTH CARE EDUCATION/TRAINING PROGRAM

## 2018-10-26 PROCEDURE — 97535 SELF CARE MNGMENT TRAINING: CPT

## 2018-10-26 PROCEDURE — 99233 SBSQ HOSP IP/OBS HIGH 50: CPT | Mod: 24,,, | Performed by: PHYSICIAN ASSISTANT

## 2018-10-26 PROCEDURE — 63600175 PHARM REV CODE 636 W HCPCS: Performed by: STUDENT IN AN ORGANIZED HEALTH CARE EDUCATION/TRAINING PROGRAM

## 2018-10-26 PROCEDURE — 25000003 PHARM REV CODE 250: Performed by: PHYSICIAN ASSISTANT

## 2018-10-26 PROCEDURE — 85025 COMPLETE CBC W/AUTO DIFF WBC: CPT

## 2018-10-26 PROCEDURE — 85730 THROMBOPLASTIN TIME PARTIAL: CPT

## 2018-10-26 PROCEDURE — 25000003 PHARM REV CODE 250: Performed by: NURSE PRACTITIONER

## 2018-10-26 PROCEDURE — 20600001 HC STEP DOWN PRIVATE ROOM

## 2018-10-26 PROCEDURE — 84100 ASSAY OF PHOSPHORUS: CPT

## 2018-10-26 PROCEDURE — 99232 SBSQ HOSP IP/OBS MODERATE 35: CPT | Mod: ,,, | Performed by: INTERNAL MEDICINE

## 2018-10-26 PROCEDURE — 80197 ASSAY OF TACROLIMUS: CPT

## 2018-10-26 PROCEDURE — 83735 ASSAY OF MAGNESIUM: CPT

## 2018-10-26 RX ORDER — FUROSEMIDE 10 MG/ML
40 INJECTION INTRAMUSCULAR; INTRAVENOUS 2 TIMES DAILY
Status: COMPLETED | OUTPATIENT
Start: 2018-10-26 | End: 2018-10-26

## 2018-10-26 RX ORDER — POLYETHYLENE GLYCOL 3350 17 G/17G
17 POWDER, FOR SOLUTION ORAL DAILY
Status: DISCONTINUED | OUTPATIENT
Start: 2018-10-26 | End: 2018-11-22

## 2018-10-26 RX ORDER — DOCUSATE SODIUM 100 MG/1
100 CAPSULE, LIQUID FILLED ORAL DAILY
Status: DISCONTINUED | OUTPATIENT
Start: 2018-10-26 | End: 2018-11-22

## 2018-10-26 RX ADMIN — ONDANSETRON 4 MG: 2 INJECTION INTRAMUSCULAR; INTRAVENOUS at 08:10

## 2018-10-26 RX ADMIN — INSULIN ASPART 14 UNITS: 100 INJECTION, SOLUTION INTRAVENOUS; SUBCUTANEOUS at 08:10

## 2018-10-26 RX ADMIN — INSULIN ASPART 14 UNITS: 100 INJECTION, SOLUTION INTRAVENOUS; SUBCUTANEOUS at 06:10

## 2018-10-26 RX ADMIN — LEVOFLOXACIN 250 MG: 250 TABLET, FILM COATED ORAL at 08:10

## 2018-10-26 RX ADMIN — MIDODRINE HYDROCHLORIDE 15 MG: 5 TABLET ORAL at 03:10

## 2018-10-26 RX ADMIN — MIDODRINE HYDROCHLORIDE 15 MG: 5 TABLET ORAL at 08:10

## 2018-10-26 RX ADMIN — AMOXICILLIN 500 MG: 500 CAPSULE ORAL at 08:10

## 2018-10-26 RX ADMIN — FUROSEMIDE 40 MG: 10 INJECTION, SOLUTION INTRAMUSCULAR; INTRAVENOUS at 08:10

## 2018-10-26 RX ADMIN — MIRTAZAPINE 7.5 MG: 7.5 TABLET ORAL at 08:10

## 2018-10-26 RX ADMIN — DOCUSATE SODIUM 100 MG: 100 CAPSULE, LIQUID FILLED ORAL at 11:10

## 2018-10-26 RX ADMIN — MAGNESIUM OXIDE TAB 400 MG (241.3 MG ELEMENTAL MG) 800 MG: 400 (241.3 MG) TAB at 08:10

## 2018-10-26 RX ADMIN — URSODIOL 300 MG: 300 CAPSULE ORAL at 08:10

## 2018-10-26 RX ADMIN — PANTOPRAZOLE SODIUM 40 MG: 40 INJECTION, POWDER, FOR SOLUTION INTRAVENOUS at 08:10

## 2018-10-26 RX ADMIN — FLUCONAZOLE 200 MG: 200 TABLET ORAL at 08:10

## 2018-10-26 RX ADMIN — PREDNISONE 10 MG: 10 TABLET ORAL at 09:10

## 2018-10-26 RX ADMIN — INSULIN ASPART 2 UNITS: 100 INJECTION, SOLUTION INTRAVENOUS; SUBCUTANEOUS at 06:10

## 2018-10-26 RX ADMIN — MYCOPHENOLATE MOFETIL 1000 MG: 250 CAPSULE ORAL at 08:10

## 2018-10-26 RX ADMIN — OXYCODONE HYDROCHLORIDE 5 MG: 5 TABLET ORAL at 10:10

## 2018-10-26 RX ADMIN — INSULIN ASPART 1 UNITS: 100 INJECTION, SOLUTION INTRAVENOUS; SUBCUTANEOUS at 10:10

## 2018-10-26 RX ADMIN — TACROLIMUS 0.5 MG: 0.5 CAPSULE ORAL at 08:10

## 2018-10-26 RX ADMIN — INSULIN ASPART 14 UNITS: 100 INJECTION, SOLUTION INTRAVENOUS; SUBCUTANEOUS at 12:10

## 2018-10-26 RX ADMIN — POLYETHYLENE GLYCOL 3350 17 G: 17 POWDER, FOR SOLUTION ORAL at 11:10

## 2018-10-26 RX ADMIN — SULFAMETHOXAZOLE AND TRIMETHOPRIM 1 TABLET: 400; 80 TABLET ORAL at 08:10

## 2018-10-26 RX ADMIN — FUROSEMIDE 40 MG: 10 INJECTION, SOLUTION INTRAMUSCULAR; INTRAVENOUS at 11:10

## 2018-10-26 NOTE — ASSESSMENT & PLAN NOTE
- Diuresed w/ Lasix 100mg x 1 (10/12).  - CRRT 10/14.  .    -SLED 10/17, 10/20  -Diuresed w/ Lasix 80 BID-- stopped (10/23)  -Will assess the need for diuresis on a daily basis  -today: Lasix 40mg x 2

## 2018-10-26 NOTE — ASSESSMENT & PLAN NOTE
- Creatinine on arrival 0.8 and trended up  -BUN/cr (10/11/18): 91/1.9   -Tacrolimus 26.9 (10/08)  -Nephrology consulted  -Renal US (10/11): no hydronephrosis  -Echo 10/12: diastolic dysfunction, likely 2/2 to ARF  - pt on renal diet w 1500 ml FR.  -CRRT 10/14; SLED 10/17, 10/20. --> no improvements noted in kidney fxn. Per nephrology, does not think SLED provides any benefit.  - Continue with lasix as patient needs diuresis

## 2018-10-26 NOTE — PROGRESS NOTES
[facility]  Nephrology  Progress Note    Patient Name: Scarlett Reddy  MRN: [id number]  Admission Date: 10/1/2018  Hospital Length of Stay: 24 days  Attending Provider: Jani Theodore MD   Primary Care Physician: Primary Doctor No  Principal Problem:Liver transplanted    Subjective:     HPI: 67 y/o female with ESLD due to Hep B and D cirrhosis MELD of 23 on 10/01/18.  Paracentesis 10/1 with 5L removed, no fluid sent for analysis. 10/02 significant for hyponatremia, Na 119 and she was admitted to LTS for sodium monitoring. On 10/05/18 she is s/p DBDLT (CMV D+/R+, steroid induction, MMF/tacro/pred maintenance) with take back on 10/6 secondary to hyperbilirubinema, no leak identified. Post-op course complicated by hypercapneic and hypoxic respiratory failure and was intubated 10/06 and extubated 10/07. Liver bx (10/6) sig for Zone 3 hemorrhage and collapse, in addition to cholestasis. ID consulted to comment on positive diphtheroid culture from ascitic fluid (likely skin colonization) as well as ESBL Klebsiella pneumoniae in urine culture (10/4).  Pt asymptomatic and cell count from ascitic fluid unremarkable. Mild peritransplant ascites- repeat US 10/6 with increased arterial resistive indices. Repeat US 10/9 w/ continued elevation in RIs and fluid collections. LFTs trending down nicely.     Patient is currently on entecavir 0.5 mg, Hepagam, mycophenalate 1000 mg BID, prednisone 20 mg and prophylaxis with Bactrim, fluconazole and Valgancyclovir.    Nephrology consulted for acute kidney failure is setting of Orthotopic Liver Transplant. Cr 0.8 on 10/05, started to trend up and 1.5 (10/09), 1.8 (10/10), and 1.9 (10/11). UOP 10/10/18: 520 ml. UA (10/09): 1+ occult blood and 1+ protein with RBC and amorphous casts.        Interval History: No acute events overnight. She remains to have good urine output but has decrease in hgb again. Noticed increase in urine output with lasix.  Creatinine  today 2.1 stable  from yesterday.  Last Sled was 10/20 with no improvement in kidney function      Review of patient's allergies indicates:  No Known Allergies  Current Facility-Administered Medications   Medication Frequency    amoxicillin capsule 500 mg Q12H    bisacodyl EC tablet 10 mg Daily PRN    bisacodyl suppository 10 mg Daily PRN    calcium carbonate 200 mg calcium (500 mg) chewable tablet 500 mg TID PRN    dextrose 50% injection 12.5 g PRN    dextrose 50% injection 25 g PRN    docusate sodium capsule 100 mg TID PRN    entecavir tablet 0.5 mg Q72H    ergocalciferol capsule 50,000 Units Q7 Days    fluconazole tablet 200 mg Daily    furosemide injection 40 mg BID    glucagon (human recombinant) injection 1 mg PRN    glucose chewable tablet 16 g PRN    glucose chewable tablet 24 g PRN    hepatitis B immun glob-maltose (Hepagam B) greatr than 312 unit/mL (5 mL) 9,999.912 Units in sodium chloride 0.9% 250 mL Once    insulin aspart U-100 pen 0-5 Units QID (AC + HS) PRN    insulin aspart U-100 pen 12 Units TIDWM    levoFLOXacin tablet 250 mg Daily    magnesium oxide tablet 800 mg BID    midodrine tablet 15 mg TID    mirtazapine tablet 7.5 mg QHS    mycophenolate capsule 1,000 mg BID    ondansetron disintegrating tablet 8 mg Q8H PRN    ondansetron injection 4 mg Q8H PRN    oxyCODONE immediate release tablet 5 mg Q4H PRN    oxyCODONE immediate release tablet Tab 10 mg Q4H PRN    pantoprazole injection 40 mg BID    polyethylene glycol packet 17 g Daily PRN    predniSONE tablet 10 mg Daily    Followed by    [START ON 10/31/2018] predniSONE tablet 5 mg Daily    Followed by    [START ON 11/7/2018] predniSONE tablet 2.5 mg Daily    promethazine (PHENERGAN) 6.25 mg in dextrose 5 % 50 mL IVPB Q6H PRN    sodium chloride 0.9% flush 3 mL PRN    sulfamethoxazole-trimethoprim 400-80mg per tablet 1 tablet Every Mon, Wed, Fri    tacrolimus capsule 0.5 mg Daily    ursodiol capsule 300 mg BID    valGANciclovir  tablet 450 mg Twice Weekly       Objective:     Vital Signs (Most Recent):  Temp: 97.5 °F (36.4 °C) (10/25/18 1137)  Pulse: 105 (10/25/18 1300)  Resp: 15 (10/25/18 1300)  BP: 129/79 (10/25/18 1140)  SpO2: 98 % (10/25/18 1140)  O2 Device (Oxygen Therapy): room air (10/25/18 1300) Vital Signs (24h Range):  Temp:  [97.5 °F (36.4 °C)-98.1 °F (36.7 °C)] 97.5 °F (36.4 °C)  Pulse:  [] 105  Resp:  [10-23] 15  SpO2:  [97 %-99 %] 98 %  BP: (102-139)/(71-85) 129/79     Weight: 76.8 kg (169 lb 6.4 oz) (10/25/18 0500)  Body mass index is 30.01 kg/m².  Body surface area is 1.85 meters squared.    I/O last 3 completed shifts:  In: 1200 [P.O.:1200]  Out: 1925 [Urine:1925]    Physical Exam   Constitutional: She is oriented to person, place, and time.   Temporal and distal extremity muscle wasting   Eyes: EOM are normal. Pupils are equal, round, and reactive to light. Scleral icterus is present.   Neck: Normal range of motion.   Cardiovascular: Normal rate and regular rhythm.   No murmur heard.  Pulmonary/Chest: Effort normal and breath sounds normal. No respiratory distress. She has no wheezes.   Abdominal: Soft. Bowel sounds are normal. She exhibits distension. There is tenderness. There is no guarding.   Musculoskeletal: Normal range of motion. She exhibits edema (3+ BLE).   Neurological: She is alert and oriented to person, place, and time.   Skin: Skin is warm and dry.   Nursing note and vitals reviewed.      Significant Labs:  CBC:   Recent Labs   Lab 10/25/18  0530   WBC 4.19   RBC 2.66*   HGB 7.7*   HCT 24.2*   PLT 43*   MCV 91   MCH 28.9   MCHC 31.8*     CMP:   Recent Labs   Lab 10/25/18  0530   *   CALCIUM 8.9   ALBUMIN 3.1*   PROT 4.2*   *   K 3.7   CO2 27   CL 97   BUN 96*   CREATININE 2.1*   ALKPHOS 95   ALT 17   AST 18   BILITOT 3.0*     All labs within the past 24 hours have been reviewed.     Significant Imaging:  Labs: Reviewed    Assessment/Plan:      Post LT - Acute renal failure    Post-LT EAV  is multifactorial in origin and has been related to the severity of liver disease, toxicity of immunosuppressive therapy and hepatic ischaemia reperfusion injury might be a driving force in the aetiology of post-LT EVA.      - acute gradual worsening of renal function s/p OLT  - 10/06 was taken to the OR for possibility of billiary leak and post op complication due to hypercapnic hypoxic respiratory failure, no blood pressure drop intraoperatively per anesthesiology notes. Patient has not been needing vasopressors to maintain BP  - Creatinine on arrival 0.8 and has been up trending   - BUN/cr (10/11/18): 91/1.9; (10/12/18): 100/2.0; (10/13/18): 110/2.4  - Tacrolimus 26.9 (10/08); 6.8 (10/12); 5.6 (10/13), remains in none nephrotoxic levels   - Protein Creatine ratio: 0.82  - UA consistent with hematuria, proteinuria, RBC and amorphous casts   - Fena 0.5%; consistent with hypovolemia   - Renal US negative for obstruction, stricture or, hydronephrosis    Assessment: Acute Renal failure in setting of hepatic ischemia reperfusion injury and poor perfusion status vs acute tubular necrosis from immunosuppressive agents      Plan:  -SLED for uremic toxin clearance was perfromed over the weekend for uremic platelet dysfunction. She remained to be anemic and still required PRBCs. Will continue to watch her kidney function but for now do not think that SLED provided any benefit    - Continue with lasix at patient needs diuresis, defer to primary team for dosing, can give albumin to augment lasix  - Replete her magnesium as it remains low   -Modified renal diet : decrease in protein , normal intake of K and phos   - avoid nephrotoxic drugs, ACEI/ARB, NSAIDS  - adjust drugs to GFR  - Agree with fluid restriction of 1500 cc/day  - Acute RRT not indicated at this point                                   Thank you for your consult. I will follow-up with patient. Please contact us if you have any additional questions.    Vaishali SORIANO  MD Margy  Nephrology  [facility]

## 2018-10-26 NOTE — SUBJECTIVE & OBJECTIVE
Scheduled Meds:   amoxicillin  500 mg Oral Q12H    docusate sodium  100 mg Oral Daily    entecavir  0.5 mg Oral Q72H    ergocalciferol  50,000 Units Oral Q7 Days    fluconazole  200 mg Oral Daily    furosemide  40 mg Intravenous BID    insulin aspart U-100  14 Units Subcutaneous TIDWM    levoFLOXacin  250 mg Oral Daily    magnesium oxide  800 mg Oral BID    midodrine  15 mg Oral TID    mirtazapine  7.5 mg Oral QHS    mycophenolate  1,000 mg Oral BID    pantoprazole  40 mg Intravenous BID    polyethylene glycol  17 g Oral Daily    predniSONE  10 mg Oral Daily    Followed by    [START ON 10/31/2018] predniSONE  5 mg Oral Daily    Followed by    [START ON 11/7/2018] predniSONE  2.5 mg Oral Daily    sulfamethoxazole-trimethoprim 400-80mg  1 tablet Oral Every Mon, Wed, Fri    tacrolimus  0.5 mg Oral Daily    ursodiol  300 mg Oral BID    valGANciclovir  450 mg Oral Twice Weekly     Continuous Infusions:  PRN Meds:bisacodyl, bisacodyl, calcium carbonate, dextrose 50%, dextrose 50%, glucagon (human recombinant), glucose, glucose, insulin aspart U-100, ondansetron, ondansetron, oxyCODONE, oxyCODONE, polyethylene glycol, promethazine (PHENERGAN) IVPB, sodium chloride 0.9%    Review of Systems   Constitutional: Positive for activity change, appetite change (decreased) and fatigue. Negative for fever.   Respiratory: Negative for cough and shortness of breath.    Cardiovascular: Positive for leg swelling. Negative for chest pain.   Gastrointestinal: Positive for abdominal distention and abdominal pain. Negative for constipation, diarrhea and vomiting.   Genitourinary: Negative for decreased urine volume, difficulty urinating and dysuria.   Musculoskeletal: Negative for arthralgias and back pain.   Allergic/Immunologic: Positive for immunocompromised state.   Neurological: Positive for weakness. Negative for tremors and headaches.     Objective:     Vital Signs (Most Recent):  Temp: 97.7 °F (36.5 °C)  (10/26/18 1203)  Pulse: 106 (10/26/18 1200)  Resp: 16 (10/26/18 1200)  BP: 135/84 (10/26/18 1200)  SpO2: 97 % (10/26/18 1200) Vital Signs (24h Range):  Temp:  [97.7 °F (36.5 °C)-98.5 °F (36.9 °C)] 97.7 °F (36.5 °C)  Pulse:  [] 106  Resp:  [9-28] 16  SpO2:  [96 %-99 %] 97 %  BP: (102-135)/(66-84) 135/84     Weight: 74 kg (163 lb 2.3 oz)  Body mass index is 28.9 kg/m².    Intake/Output - Last 3 Shifts       10/24 0700 - 10/25 0659 10/25 0700 - 10/26 0659 10/26 0700 - 10/27 0659    P.O. 1020 420 180    Total Intake(mL/kg) 1020 (13.3) 420 (5.7) 180 (2.4)    Urine (mL/kg/hr) 1550 (0.8) 1175 (0.7) 350 (0.6)    Emesis/NG output   0    Other   0    Stool 0 0 0    Blood   0    Total Output 1550 1175 350    Net -530 -755 -170           Urine Occurrence 0 x 0 x 0 x    Stool Occurrence 2 x 2 x 0 x    Emesis Occurrence   0 x          Physical Exam   Constitutional: She is oriented to person, place, and time.   Temporal and distal extremity muscle wasting   Eyes: EOM are normal. Pupils are equal, round, and reactive to light. Scleral icterus is present.   Neck: Normal range of motion.   Cardiovascular: Normal rate and regular rhythm.   No murmur heard.  Pulmonary/Chest: Effort normal and breath sounds normal. No respiratory distress. She has no wheezes.   Abdominal: Soft. Bowel sounds are normal. She exhibits distension. There is tenderness.   Chevron incision w/ staples, CDI   Musculoskeletal: Normal range of motion. She exhibits edema (3+ BLE).   Neurological: She is alert and oriented to person, place, and time.   Skin: Skin is warm and dry.   Vitals reviewed.      Laboratory:  Immunosuppressants         Stop Route Frequency     tacrolimus capsule 0.5 mg      -- Oral Daily     mycophenolate capsule 1,000 mg      -- Oral 2 times daily        CBC:   Recent Labs   Lab 10/26/18  0530   WBC 5.97   RBC 2.81*   HGB 8.3*   HCT 25.5*   PLT 79*   MCV 91   MCH 29.5   MCHC 32.5     CMP:   Recent Labs   Lab 10/26/18  0530   *    CALCIUM 9.0   ALBUMIN 3.0*   PROT 4.5*   *   K 3.7   CO2 25   CL 95   *   CREATININE 2.2*   ALKPHOS 110   ALT 21   AST 23   BILITOT 3.3*     Labs within the past 24 hours have been reviewed.    Diagnostic Results:  I have personally reviewed all pertinent imaging studies.

## 2018-10-26 NOTE — PROGRESS NOTES
[facility]  Nephrology  Progress Note    Patient Name: Scarlett Reddy  MRN: [id number]  Admission Date: 10/1/2018  Hospital Length of Stay: 23 days  Attending Provider: Jani Theodore MD   Primary Care Physician: Primary Doctor No  Principal Problem:Liver transplanted    Consults  Subjective:     Interval History:     Pt seen this morning  No acute events overnight  Making urine    ROS  Her main concern was discomfort from the compression stockings  Her  used smart phone  and expressed if those can come off  She denied any SOB   She indicated having abdominal discomfort due to distention    Review of patient's allergies indicates:  No Known Allergies  Current Facility-Administered Medications   Medication Frequency    amoxicillin capsule 500 mg Q12H    bisacodyl EC tablet 10 mg Daily PRN    bisacodyl suppository 10 mg Daily PRN    calcium carbonate 200 mg calcium (500 mg) chewable tablet 500 mg TID PRN    dextrose 50% injection 12.5 g PRN    dextrose 50% injection 25 g PRN    docusate sodium capsule 100 mg TID PRN    entecavir tablet 0.5 mg Q72H    ergocalciferol capsule 50,000 Units Q7 Days    fluconazole tablet 200 mg Daily    glucagon (human recombinant) injection 1 mg PRN    glucose chewable tablet 16 g PRN    glucose chewable tablet 24 g PRN    insulin aspart U-100 pen 0-5 Units QID (AC + HS) PRN    [START ON 10/26/2018] insulin aspart U-100 pen 14 Units TIDWM    levoFLOXacin tablet 250 mg Daily    magnesium oxide tablet 800 mg BID    midodrine tablet 15 mg TID    mirtazapine tablet 7.5 mg QHS    mycophenolate capsule 1,000 mg BID    ondansetron disintegrating tablet 8 mg Q8H PRN    ondansetron injection 4 mg Q8H PRN    oxyCODONE immediate release tablet 5 mg Q4H PRN    oxyCODONE immediate release tablet Tab 10 mg Q4H PRN    pantoprazole injection 40 mg BID    polyethylene glycol packet 17 g Daily PRN    predniSONE tablet 10 mg Daily    Followed by     [START ON 10/31/2018] predniSONE tablet 5 mg Daily    Followed by    [START ON 11/7/2018] predniSONE tablet 2.5 mg Daily    promethazine (PHENERGAN) 6.25 mg in dextrose 5 % 50 mL IVPB Q6H PRN    sodium chloride 0.9% flush 3 mL PRN    sulfamethoxazole-trimethoprim 400-80mg per tablet 1 tablet Every Mon, Wed, Fri    tacrolimus capsule 0.5 mg Daily    ursodiol capsule 300 mg BID    valGANciclovir tablet 450 mg Twice Weekly       Objective:     Vital Signs (Most Recent):  Temp: 98 °F (36.7 °C) (10/25/18 1930)  Pulse: 107 (10/25/18 1930)  Resp: 14 (10/25/18 1930)  BP: 115/75 (10/25/18 1930)  SpO2: 99 % (10/25/18 1930)  O2 Device (Oxygen Therapy): room air (10/25/18 1700) Vital Signs (24h Range):  Temp:  [97.5 °F (36.4 °C)-98.5 °F (36.9 °C)] 98 °F (36.7 °C)  Pulse:  [] 107  Resp:  [10-28] 14  SpO2:  [97 %-99 %] 99 %  BP: (105-139)/(73-85) 115/75     Weight: 76.8 kg (169 lb 6.4 oz) (10/25/18 0500)  Body mass index is 30.01 kg/m².  Body surface area is 1.85 meters squared.    I/O last 3 completed shifts:  In: 1200 [P.O.:1200]  Out: 2125 [Urine:2125]    Physical Exam  Gen frail appearing  Alert and awake  Respiration unlabored  Lungs decreased BS at bases  CV S1S2+  abd distention+  extr edema ++  Skin icteric    Significant Labs:sure  CBC:   Recent Labs   Lab 10/25/18  1854   WBC 6.12   RBC 2.89*   HGB 8.4*   HCT 25.8*   PLT 65*   MCV 89   MCH 29.1   MCHC 32.6     CMP:   Recent Labs   Lab 10/25/18  0530   *   CALCIUM 8.9   ALBUMIN 3.1*   PROT 4.2*   *   K 3.7   CO2 27   CL 97   BUN 96*   CREATININE 2.1*   ALKPHOS 95   ALT 17   AST 18   BILITOT 3.0*     All labs within the past 24 hours have been reviewed.        Assessment/Plan:     Active Diagnoses:    Diagnosis Date Noted POA    PRINCIPAL PROBLEM:  Liver transplanted [Z94.4] 10/05/2018 Not Applicable    Duodenal ulcer [K26.9] 10/19/2018 No    UTI (urinary tract infection) [N39.0] 10/17/2018 Yes    Acute blood loss anemia [D62] 10/16/2018 No     EVA (acute kidney injury) [N17.9] 10/11/2018 No     Post LT - Acute renal failure [N17.9] 10/11/2018 No    Hypervolemia associated with renal insufficiency [E87.70, N28.9]  Unknown    Long-term use of immunosuppressant medication [Z79.899] 10/09/2018 Not Applicable    At risk for opportunistic infections [Z91.89] 10/09/2018 No    Metabolic acidosis [E87.2] 10/09/2018 No    Encounter for weaning from ventilator [Z99.11]  Not Applicable    Corticosteroids adverse reaction [T38.0X5A] 10/05/2018 No    Prophylactic immunotherapy [Z29.8] 10/05/2018 Not Applicable    Ascites [R18.8] 08/30/2018 Yes    Physical deconditioning [R53.81] 08/30/2018 No    Weakness [R53.1] 08/30/2018 Yes    Hyponatremia [E87.1]  Yes    Hypotension [I95.9]  Yes    Severe malnutrition [E43] 08/16/2018 Yes    Anemia requiring transfusions [D64.9] 08/16/2018 Yes    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 [B18.0, K74.60] 08/15/2018 Yes    Type 2 diabetes mellitus with diabetic polyneuropathy, with long-term current use of insulin [E11.42, Z79.4] 08/15/2018 Not Applicable      Problems Resolved During this Admission:    Diagnosis Date Noted Date Resolved POA    Chronic liver failure without hepatic coma [K72.10]  10/14/2018 Yes    Hypokalemia [E87.6] 09/20/2018 10/09/2018 Yes    Liver transplant candidate [Z76.82]  10/10/2018 Not Applicable    Hepatic encephalopathy [K72.90] 08/14/2018 10/09/2018 Yes       EVA multifactorial, CNI toxicity/ ATN  Edema  GI bleed    - she has been putting out at least over 1 liter of urine  - continue diuresis with lasix, dosing per primary team  - prograf levels monitoring per primary liver team  - she has net 9.5 L negative since admission  - restrict salt and fluid intake  - no indication for dialysis  - disproportionately elevated BUN due to GI bleed/ steroid use/ diuretic induced prerenal azotemia  - daily renal panel to monitor electrolytes, acid base status, creatinine        Thank you for your consult. I will follow-up with patient. Please contact us if you have any additional questions.    Wilfrido Turner MD  Nephrology  [facility]

## 2018-10-26 NOTE — ASSESSMENT & PLAN NOTE
Urine Cx 10/15 w/ Klebsiella pneumonia  Started Ciprofloxacin 10/17. Dc'd 10/19. Coverage w/ triple therapy for h. pylori  Pt asymptomatic at this time

## 2018-10-26 NOTE — PLAN OF CARE
Problem: Patient Care Overview  Goal: Plan of Care Review  Outcome: Ongoing (interventions implemented as appropriate)  Pt AAOx4, VSS, afebrile.  Currently denies c/o pain.   at pt bedside active in patient care.  Pt up in chair throughout most of shift with frequent walks to bathroom with 1-2person assist and walker.  Fall risk precautions initiated.  Pt in lowest bed position setting, lighting adjusted, pt to wear nonskid socks when ambulating, side rails up x2.  Pt remain free from falls during shift.  Pt verbalize understanding to call when needed assistance. Call light within reach.  Will continue to monitor.

## 2018-10-26 NOTE — SUBJECTIVE & OBJECTIVE
"Interval HPI:   Overnight events: remains in TSU. BG at or slightly above goal with scheduled insulin. Prednisone 10 mg daily; standard steroid taper.   Eatin%  Nausea: No  Hypoglycemia and intervention: No  Fever: No  TPN and/or TF: No      /79   Pulse 101   Temp 98 °F (36.7 °C)   Resp 15   Ht 5' 3" (1.6 m)   Wt 74 kg (163 lb 2.3 oz)   LMP  (LMP Unknown)   SpO2 99%   Breastfeeding? No   BMI 28.90 kg/m²     Labs Reviewed and Include    Recent Labs   Lab 10/26/18  0530   *   CALCIUM 9.0   ALBUMIN 3.0*   PROT 4.5*   *   K 3.7   CO2 25   CL 95   *   CREATININE 2.2*   ALKPHOS 110   ALT 21   AST 23   BILITOT 3.3*     Lab Results   Component Value Date    WBC 5.97 10/26/2018    HGB 8.3 (L) 10/26/2018    HCT 25.5 (L) 10/26/2018    MCV 91 10/26/2018    PLT 79 (L) 10/26/2018     No results for input(s): TSH, FREET4 in the last 168 hours.  Lab Results   Component Value Date    HGBA1C 6.0 (H) 10/04/2018       Nutritional status:   Body mass index is 28.9 kg/m².  Lab Results   Component Value Date    ALBUMIN 3.0 (L) 10/26/2018    ALBUMIN 3.1 (L) 10/25/2018    ALBUMIN 2.6 (L) 10/24/2018     Lab Results   Component Value Date    PREALBUMIN 12 (L) 2018       Estimated Creatinine Clearance: 23.2 mL/min (A) (based on SCr of 2.2 mg/dL (H)).    Accu-Checks  Recent Labs     10/23/18  1317 10/23/18  1714 10/23/18  2127 10/24/18  0807 10/24/18  1235 10/24/18  1734 10/24/18  1914 10/25/18  0818 10/25/18  1435 10/26/18  0748   POCTGLUCOSE 276* 231* 196* 107 110 160* 183* 177* 235* 143*       Current Medications and/or Treatments Impacting Glycemic Control  Immunotherapy:    Immunosuppressants         Stop Route Frequency     tacrolimus capsule 0.5 mg      -- Oral Daily     mycophenolate capsule 1,000 mg      -- Oral 2 times daily        Steroids:   Hormones (From admission, onward)    Start     Stop Route Frequency Ordered    18 0900  predniSONE tablet 2.5 mg       0859 Oral Daily " 10/17/18 0943    10/31/18 0900  predniSONE tablet 5 mg      11/07 0859 Oral Daily 10/17/18 0943    10/24/18 0900  predniSONE tablet 10 mg      10/31 0859 Oral Daily 10/17/18 0943    10/17/18 0945  predniSONE tablet 15 mg      10/24 0859 Oral Daily 10/17/18 0943        Pressors:    Autonomic Drugs (From admission, onward)    Start     Stop Route Frequency Ordered    10/10/18 1500  midodrine tablet 15 mg      -- Oral 3 times daily 10/10/18 1122    10/06/18 1617  rocuronium (ZEMURON) 10 mg/mL injection     Comments:  Created by cabinet override    10/07 0429   10/06/18 1617        Hyperglycemia/Diabetes Medications:   Antihyperglycemics (From admission, onward)    Start     Stop Route Frequency Ordered    10/26/18 0715  insulin aspart U-100 pen 14 Units      -- SubQ 3 times daily with meals 10/25/18 1716    10/14/18 0925  insulin aspart U-100 pen 0-5 Units      -- SubQ Before meals & nightly PRN 10/14/18 0825    10/07/18 1200  insulin regular (Humulin R) 100 Units in sodium chloride 0.9% 100 mL infusion      10/13 2100 IV Continuous 10/07/18 1055

## 2018-10-26 NOTE — PT/OT/SLP PROGRESS
Physical Therapy Treatment    Patient Name:  Scarlett Reddy   MRN:  [id number]    Recommendations:     Discharge Recommendations:  home health PT, home health OT   Discharge Equipment Recommendations: bedside commode, walker, rolling   Barriers to discharge: Decreased caregiver support at current functional level    Assessment:     Scarlett Reddy is a 68 y.o. female admitted with a medical diagnosis of Liver transplanted.  She presents with the following impairments/functional limitations:  weakness, gait instability, impaired endurance, impaired balance, decreased lower extremity function, impaired cardiopulmonary response to activity, edema, impaired self care skills, impaired functional mobilty. Activity tolerance limited this session with pt only able to ambulate short distance within room this date. Requiring increased assist to complete bed mobility and transfers with generalized weakness and increased LE edema noted. Pt would continue to benefit from skilled acute PT in order to address current deficits and progress functional mobility.     Rehab Prognosis:  good; patient would benefit from acute skilled PT services to address these deficits and reach maximum level of function.      Recent Surgery: Procedure(s) (LRB):  EGD (ESOPHAGOGASTRODUODENOSCOPY) (N/A) 8 Days Post-Op    Plan:     During this hospitalization, patient to be seen 4 x/week to address the above listed problems via gait training, therapeutic activities, therapeutic exercises, neuromuscular re-education  · Plan of Care Expires:  11/05/18   Plan of Care Reviewed with: patient, spouse    Subjective     Communicated with RN prior to session.  Patient found UIC upon PT entry to room, agreeable to treatment.      Chief Complaint: appears to have discomfort from BLE edema   Pain/Comfort:  · Pain Rating 1: 0/10    Patients cultural, spiritual, [employer] conflicts given the current situation: none noted     Objective:     Patient found with: pulse ox  (continuous), telemetry     General Precautions: Standard, fall, contact   Orthopedic Precautions:N/A   Braces: N/A     Functional Mobility:  · Bed Mobility:   · Sit to Supine: max assist  · Scooting: total assist (supine scooting with drawsheet to HOB)   · Transfers:     · Sit to Stand:  moderate assistance with rolling walker  · With cues for hand placement and transfer technique with RW   · Toilet Transfer: contact guard assistance with  rolling walker and BSC over toilet  using  Stand Pivot  · Gait: 28 ft. with RW and CGA  ? Decreased step length, decreased giuseppe, impaired weight-shifting ability, decreased heel strike, decreased toe-floor clearance      AM-PAC 6 CLICK MOBILITY  Turning over in bed (including adjusting bedclothes, sheets and blankets)?: 3  Sitting down on and standing up from a chair with arms (e.g., wheelchair, bedside commode, etc.): 2  Moving from lying on back to sitting on the side of the bed?: 3  Moving to and from a bed to a chair (including a wheelchair)?: 3  Need to walk in hospital room?: 3  Climbing 3-5 steps with a railing?: 2  Basic Mobility Total Score: 16       Therapeutic Activities and Exercises:   Ambulated within room. PT then assisted pt to bathroom. Therapist waiting outside of room and instructed pt's  to call PT when pt finished toileting. However, PT went to check on pt and found pt sitting EOB ( assisted pt from toilet to bed). PT assisted pt back to supine.     Patient left supine with all lines intact, call button in reach and RN and pt's  present..    GOALS:   Multidisciplinary Problems     Physical Therapy Goals        Problem: Physical Therapy Goal    Goal Priority Disciplines Outcome Goal Variances Interventions   Physical Therapy Goal     PT, PT/OT Ongoing (interventions implemented as appropriate)     Description:  Goals to be met by: 2018    Patient will increase functional independence with mobility by performin. Supine to sit  with Contact Guard Assistance - not met  2. Sit to stand transfer with Supervision using LRAD or no AD - not met  3. Bed to chair transfer with Supervision using LRAD or no AD - not met  4. Gait  x 250 feet with Supervision using LRAD or no AD - not met  5. Stand for 3 minutes with Supervision to increase activity tolerance - not met   6. Lower extremity exercise program x15 reps per handout, with independence - not met                        Time Tracking:     PT Received On: 10/26/18  PT Start Time: 1521     PT Stop Time: 1534  PT Total Time (min): 13 min     Billable Minutes: Therapeutic Activity 13    Treatment Type: Treatment  PT/PTA: PT     PTA Visit Number: 0     Radha Nguyen, PT, DPT   10/26/2018  [phone]

## 2018-10-26 NOTE — PLAN OF CARE
Problem: Occupational Therapy Goal  Goal: Occupational Therapy Goal  Goals to be met by: 11/7/18 ( revised 10/24/18)    Patient will increase functional independence with ADLs by performing:    Feeding with [place].  UE Dressing with Supervision.  LE Dressing with Supervision.  Grooming while standing at sink with Supervision.  Toileting from toilet with Supervision for hygiene and clothing management.   Toilet transfer to toilet with Supervision.      Outcome: Ongoing (interventions implemented as appropriate)  Continue with POC.   Laura corado OT  10/26/2018

## 2018-10-26 NOTE — PROGRESS NOTES
[facility]  Liver Transplant  Progress Note    Patient Name: Scarlett Reddy  MRN: [id number]  Admission Date: 10/1/2018  Hospital Length of Stay: 24 days  Code Status: Full Code  Primary Care Provider: Primary Doctor No  Post-Operative Day: 21    ORGAN:   LIVER  Disease Etiology: Cirrhosis: Type B and D  Donor Type:    - Brain Death  CDC High Risk:   No  Donor CMV Status:   Donor CMV Status: Positive  Donor HBcAB:   Negative  Donor HCV Status:   Negative  Donor HBV SETH: Negative  Donor HCV SETH: Negative  Whole or Partial: Whole Liver  Biliary Anastomosis: End to End  Arterial Anatomy: Replaced Left Hepatic and Right Hepatic  Subjective:     History of Present Illness:  Ms. Reddy is a 69 y/o female with PMH of ESLD secondary Hep B and D.  Listed for liver transplant with MELD 23.  Paracentesis 10/1 with 5L removed, no fluid sent for analysis.  Morning labs significant for hyponatremia, Na 119.  Pt admitted to LTS for sodium monitoring.        Hospital Course:  Hospital Course: 69 y/o F h/o HBV/delta cirrhosis s/p DBDLT 10/5/18 (CMV D+/R+, steroid induction, MMF/tacro/pred maintenance) with take back on 10/6 2/2 hyperbilirubinema and bilious VALORIE drainage, no leak identified. Post-op course c/b hypercapneic and hypoxic respiratory failure and was reintubated though quickly extubated now doing well. Liver bx (10/6) sig for Zone 3 hemorrhage and collapse, in addition to cholestasis. Transferred from ICU on POD #4. ID consulted to comment on positive diphtheroid culture from ascitic fluid (likely skin colonization) as well as ESBL Klebsiella pneumoniae in urine culture (10/4).  Pt asymptomatic and cell count from ascitic fluid unremarkable. Per ID recs, no need to treat diphtheroids or asymptomatic ESBL Kleb pneumo bacteriuria though pt has had 6 days of therapy which would cover these organisms. Mild peritransplant ascites- repeat US 10/6 with increased arterial resistive indices. Repeat US 10/9  w/ continued elevation in RIs and fluid collections. LFTs trending down nicely.     Acute gradual worsening of renal function s/p OLT.Creatinine on arrival 0.8 and has been up trending. Nephrology consulted for post-op EVA. Remains on renal/low K diet. Patient continued to be diuresed with lasix. BUN and Cr remain elevated requiring multiple rounds of dialysis (10/15, 10/17, 10/20). No significant improvements in kidney function noted w/ SLED. Pt remains anemic requiring multiple blood transfusions (10/14, 10/16, 10/18, 10/19, 10/22).  Pt reported dark stools 10/15 but color has normalized. FOBT+. EGD 10/18 consistent with ulcerated mucosa in duodenum s/p coagulation. Biopsy pending. Concern for H. Pylori. Started Amoxicillin/levofloxacin (10/19). Remains on Protonix 40 mg BID. Will cont close monitoring H/H, transfuse as needed with goal Hb > 7.0. Decreased platelets, improvements w/ 2 u plts (10/18, 10/19) and DDAVP.   Of note, Urine cx + Klebsiella pneumoniae (10/13). Received 3 day course of ciprofloxacin, dc'd 10/19.   On 10/19 pm pt c/o crushing chest pain. Troponin 0.094, likely 2/2 ischemic stress. EKG NSR.     Interval history: Pt doing ok this morning. Still c/o pain and tenderness across abdomen likely 2/2 excess fluid. H/H stable. No need for blood transfusion at this time. Cont to monitor. Cr trending up and BUN elevated. Continue diuresing w/ Lasix 40mg x 2. Having good urine output. Remains off heparin due to possible ongoing GI bleed. Pt told to wear compression stockings at this time. Pt eating well. +BM. No further complaints at this time.    Scheduled Meds:   amoxicillin  500 mg Oral Q12H    docusate sodium  100 mg Oral Daily    entecavir  0.5 mg Oral Q72H    ergocalciferol  50,000 Units Oral Q7 Days    fluconazole  200 mg Oral Daily    furosemide  40 mg Intravenous BID    insulin aspart U-100  14 Units Subcutaneous TIDWM    levoFLOXacin  250 mg Oral Daily    magnesium oxide  800 mg Oral  BID    midodrine  15 mg Oral TID    mirtazapine  7.5 mg Oral QHS    mycophenolate  1,000 mg Oral BID    pantoprazole  40 mg Intravenous BID    polyethylene glycol  17 g Oral Daily    predniSONE  10 mg Oral Daily    Followed by    [START ON 10/31/2018] predniSONE  5 mg Oral Daily    Followed by    [START ON 11/7/2018] predniSONE  2.5 mg Oral Daily    sulfamethoxazole-trimethoprim 400-80mg  1 tablet Oral Every Mon, Wed, Fri    tacrolimus  0.5 mg Oral Daily    ursodiol  300 mg Oral BID    valGANciclovir  450 mg Oral Twice Weekly     Continuous Infusions:  PRN Meds:bisacodyl, bisacodyl, calcium carbonate, dextrose 50%, dextrose 50%, glucagon (human recombinant), glucose, glucose, insulin aspart U-100, ondansetron, ondansetron, oxyCODONE, oxyCODONE, polyethylene glycol, promethazine (PHENERGAN) IVPB, sodium chloride 0.9%    Review of Systems   Constitutional: Positive for activity change, appetite change (decreased) and fatigue. Negative for fever.   Respiratory: Negative for cough and shortness of breath.    Cardiovascular: Positive for leg swelling. Negative for chest pain.   Gastrointestinal: Positive for abdominal distention and abdominal pain. Negative for constipation, diarrhea and vomiting.   Genitourinary: Negative for decreased urine volume, difficulty urinating and dysuria.   Musculoskeletal: Negative for arthralgias and back pain.   Allergic/Immunologic: Positive for immunocompromised state.   Neurological: Positive for weakness. Negative for tremors and headaches.     Objective:     Vital Signs (Most Recent):  Temp: 97.7 °F (36.5 °C) (10/26/18 1203)  Pulse: 106 (10/26/18 1200)  Resp: 16 (10/26/18 1200)  BP: 135/84 (10/26/18 1200)  SpO2: 97 % (10/26/18 1200) Vital Signs (24h Range):  Temp:  [97.7 °F (36.5 °C)-98.5 °F (36.9 °C)] 97.7 °F (36.5 °C)  Pulse:  [] 106  Resp:  [9-28] 16  SpO2:  [96 %-99 %] 97 %  BP: (102-135)/(66-84) 135/84     Weight: 74 kg (163 lb 2.3 oz)  Body mass index is 28.9  kg/m².    Intake/Output - Last 3 Shifts       10/24 0700 - 10/25 0659 10/25 0700 - 10/26 0659 10/26 0700 - 10/27 0659    P.O. 1020 420 180    Total Intake(mL/kg) 1020 (13.3) 420 (5.7) 180 (2.4)    Urine (mL/kg/hr) 1550 (0.8) 1175 (0.7) 350 (0.6)    Emesis/NG output   0    Other   0    Stool 0 0 0    Blood   0    Total Output 1550 1175 350    Net -530 -755 -170           Urine Occurrence 0 x 0 x 0 x    Stool Occurrence 2 x 2 x 0 x    Emesis Occurrence   0 x          Physical Exam   Constitutional: She is oriented to person, place, and time.   Temporal and distal extremity muscle wasting   Eyes: EOM are normal. Pupils are equal, round, and reactive to light. Scleral icterus is present.   Neck: Normal range of motion.   Cardiovascular: Normal rate and regular rhythm.   No murmur heard.  Pulmonary/Chest: Effort normal and breath sounds normal. No respiratory distress. She has no wheezes.   Abdominal: Soft. Bowel sounds are normal. She exhibits distension. There is tenderness.   Chevron incision w/ staples, CDI   Musculoskeletal: Normal range of motion. She exhibits edema (3+ BLE).   Neurological: She is alert and oriented to person, place, and time.   Skin: Skin is warm and dry.   Vitals reviewed.      Laboratory:  Immunosuppressants         Stop Route Frequency     tacrolimus capsule 0.5 mg      -- Oral Daily     mycophenolate capsule 1,000 mg      -- Oral 2 times daily        CBC:   Recent Labs   Lab 10/26/18  0530   WBC 5.97   RBC 2.81*   HGB 8.3*   HCT 25.5*   PLT 79*   MCV 91   MCH 29.5   MCHC 32.5     CMP:   Recent Labs   Lab 10/26/18  0530   *   CALCIUM 9.0   ALBUMIN 3.0*   PROT 4.5*   *   K 3.7   CO2 25   CL 95   *   CREATININE 2.2*   ALKPHOS 110   ALT 21   AST 23   BILITOT 3.3*     Labs within the past 24 hours have been reviewed.    Diagnostic Results:  I have personally reviewed all pertinent imaging studies.    Assessment/Plan:     * Liver transplanted    -pmhx of  ESLD secondary to hep B  cirrhosis, now s/p liver transplant on 10/5, with takeback for hyperbilirubinemia and bilious VALORIE output 10/6; no biliary leak identified.   -POD 1 US: increased arterial resistive indices, suggestive of edema vs cute rejection vs congestion  -Repeat US 10/9 with continued elevation of RIs  -LFTs trending down  -T bili trending down  -repeat liver US (10/11): elevated RIs  - LFTs normalizing            Duodenal ulcer    EGD 10/18- sig for ulcerated duodenal mucosa.   Biopsy pending  Cont protonix 40 BID  Started H. Pylori tx 10/19: Amoxicillin/Levo. Remains on PPI       UTI (urinary tract infection)    Urine Cx 10/15 w/ Klebsiella pneumonia  Started Ciprofloxacin 10/17. Dc'd 10/19. Coverage w/ triple therapy for h. pylori  Pt asymptomatic at this time       Acute blood loss anemia    H/H cont to fluctuate.   FOBT +  EGD 10/18- showed diffuse bleeding with ulcerated duodenal mucosa   Monitoring w/ CBC q 12 to assess the need for transfusion            Post LT - Acute renal failure    - Creatinine on arrival 0.8 and trended up  -BUN/cr (10/11/18): 91/1.9   -Tacrolimus 26.9 (10/08)  -Nephrology consulted  -Renal US (10/11): no hydronephrosis  -Echo 10/12: diastolic dysfunction, likely 2/2 to ARF  - pt on renal diet w 1500 ml FR.  -CRRT 10/14; SLED 10/17, 10/20. --> no improvements noted in kidney fxn. Per nephrology, does not think SLED provides any benefit.  - Continue with lasix as patient needs diuresis     Hypervolemia associated with renal insufficiency    - Diuresed w/ Lasix 100mg x 1 (10/12).  - CRRT 10/14.  .    -SLED 10/17, 10/20  -Diuresed w/ Lasix 80 BID-- stopped (10/23)  -Will assess the need for diuresis on a daily basis  -today: Lasix 40mg x 2       EVA (acute kidney injury)           Metabolic acidosis    - Started Na bicarb 1300 QID.  -CRRT 10/14  -SLED 10/17, 10/20         At risk for opportunistic infections    - Valcyte for CMV prophylaxis  - Bactrim for PCP prophylaxis (MWF)  - Fluconazole  for fungal prophylaxis        Long-term use of immunosuppressant medication    -  Cellcept/Prograf/pred  -  Held prograf 10/8 for elevated levels  -  Resumed Prograf 10/10/19. Will adjust as needed.   -  Will monitor for signs of toxic side effects, check daily tacrolimus troughs, and change meds accordingly.       Prophylactic immunotherapy    - See long term use immunosuppresion       Ascites    - paracentesis 10/1, 5L removed, no fluid sent for analysis.  - diuresing with albumin and lasix 10/9.  - Per Nephrology, on 10/13- Lasix 80 mg bid and monitor.  No need for RRT.  -  10/14, pt w worsening edema.          Weakness    - see physical decond.       Physical deconditioning    - PT/OT consulted.  - Recommend home health PT and rolling walker at discharge        Hyponatremia    - Na 119 on admit.  -Na was improving post transplant w HD.  - Na stable at this time. Monitoring w/ daily labs            Hypotension    - continue Midodrine 15 mg TID.   Monitor.       Anemia requiring transfusions    - Fluctuating H/H.    -Transfuse 10/14 and 10/16 (1 unit); 10/18 (2 units); 10/19 (1 unit), 10/22 (1 unit)  -Decreased platelets -- 1 u plt given 10/18   -DDAVP given 10/18 and 10/19  -FOBT +  -LDH elevated, Hapto < 10  -EGD 10/18: ulcerated duodenal mucosa   -Monitoring w/ CBC q12hr to assess the need for transfusion        Severe malnutrition    - dietary consulted.   - temporal and distal extremity muscle wasting and edema  - encourage supplements and to increase nutritional intake   - pt on renal/low K+ diet     Type 2 diabetes mellitus with diabetic polyneuropathy, with long-term current use of insulin    - continue home regimen.  - endocrine consulted.  -Pt off insulin drip at this time.  Apprec endo recs.        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  -Tx w/ Entecavir- dose changed to q72 hours given renal function.             VTE Risk Mitigation (From admission, onward)         Ordered     Place sequential compression device  Until discontinued      10/05/18 0709          The patients clinical status was discussed at multidisplinary rounds, involving transplant surgery, transplant medicine, pharmacy, nursing, nutrition, and social work    Discharge Planning:  No Patient Care Coordination Note on file.      Jihan Soares PA-C  Liver Transplant  [facility]

## 2018-10-26 NOTE — ASSESSMENT & PLAN NOTE
Avoid insulin stacking and hypoglycemia.  Lab Results   Component Value Date    CREATININE 2.2 (H) 10/26/2018

## 2018-10-26 NOTE — PLAN OF CARE
Problem: Physical Therapy Goal  Goal: Physical Therapy Goal  Goals to be met by: 2018    Patient will increase functional independence with mobility by performin. Supine to sit with Contact Guard Assistance - not met  2. Sit to stand transfer with Supervision using LRAD or no AD - not met  3. Bed to chair transfer with Supervision using LRAD or no AD - not met  4. Gait  x 250 feet with Supervision using LRAD or no AD - not met  5. Stand for 3 minutes with Supervision to increase activity tolerance - not met   6. Lower extremity exercise program x15 reps per handout, with independence - not met       Outcome: Ongoing (interventions implemented as appropriate)  Goals reviewed and remain appropriate. Pt progressing towards goals.    Radha Nguyen, PT, DPT   10/26/2018  [phone]

## 2018-10-26 NOTE — ASSESSMENT & PLAN NOTE
Managed per primary team  Avoid hypoglycemia    Recent Labs   Lab 10/26/18  0530   ALT 21   AST 23   ALKPHOS 110   BILITOT 3.3*   PROT 4.5*   ALBUMIN 3.0*

## 2018-10-26 NOTE — PT/OT/SLP PROGRESS
Occupational Therapy   Treatment    Name: Scarlett Reddy  MRN: [id number]  Admitting Diagnosis:  Liver transplanted  8 Days Post-Op    Recommendations:     Discharge Recommendations: home health OT, home health PT  Discharge Equipment Recommendations:  walker, rolling, bedside commode  Barriers to discharge:  None    Subjective     Communicated with: RN prior to session.  Pt agreeable to participate with therapy this date. Family present throughout session.   Pain/Comfort:  · Pain Rating 1: 0/10  · Pain Rating Post-Intervention 1: 0/10    Patients cultural, spiritual, [employer] conflicts given the current situation: none reported    Objective:     Patient found with: telemetry, pulse ox (continuous)    General Precautions: Standard, fall, contact   Orthopedic Precautions:N/A   Braces: N/A     Occupational Performance:    Bed Mobility:    · NT, pt found seated on toilet upon arrival with family in room     Functional Mobility/Transfers:  · Patient completed Sit <> Stand Transfer with moderate assistance  with HHA  · Patient completed Toilet Transfer Stand Pivot technique with moderate assistance with  grab bars  · Functional Mobility: Pt completed functional mobility from bathroom < doorway < bedside chair with SBA using RW. She tolerated well with no LOB or SOB.     Activities of Daily Living:  · Grooming: stand by assistance to wash hands while standing at sink  · Toileting: minimum assistance to complete toileting while seated     Patient left up in chair with all lines intact, call button in reach and RN notified    [facility] 6 Click:  AMPA Total Score: 17    Treatment & Education:  -Pt edu on OT role/POC  -Importance of OOB activity with staff assistance ( UIC during each meal)  -Safety during functional t/f and mobility ( RW management, placed bed side commode over toilet to assist with sit <> stand)  - White board updated  - Multiple self care tasks completed-- assistance level noted above  - All questions/concerns  answered within OT scope of practice  - Pt completed BUE/BLE AROM exercises while seated [facility] including: 1x15 reps in shoulder flexion, elbow flexion/extension, chest press, scap squeezes; hip flexion, knee extension, and ankle pumps. Pt tolerated well with min rest breaks between each exercise.   Education:    Assessment:     Scarlett Reddy is a 68 y.o. female with a medical diagnosis of Liver transplanted.  She presents alert and motivated to participate with therapy.  Performance deficits affecting function are weakness, impaired endurance, impaired self care skills, impaired functional mobilty, gait instability, impaired balance, impaired cardiopulmonary response to activity, edema, decreased lower extremity function.  Pt is progressing well with goals however demo's difficulty with sit <> stand transfers from lower surfaces. Pt would benefit from HHOT following d/c to continue to progress towards goals and ensure safe transition to home environment.     Rehab Prognosis:  Good; patient would benefit from acute skilled OT services to address these deficits and reach maximum level of function.       Plan:     Patient to be seen 4 x/week to address the above listed problems via self-care/home management, therapeutic activities, therapeutic exercises  · Plan of Care Expires: 11/07/18  · Plan of Care Reviewed with: patient, spouse    This Plan of care has been discussed with the patient who was involved in its development and understands and is in agreement with the identified goals and treatment plan    GOALS:   Multidisciplinary Problems     Occupational Therapy Goals        Problem: Occupational Therapy Goal    Goal Priority Disciplines Outcome Interventions   Occupational Therapy Goal     OT, PT/OT Ongoing (interventions implemented as appropriate)    Description:  Goals to be met by: 11/7/18 ( revised 10/24/18)    Patient will increase functional independence with ADLs by performing:    Feeding with  [place].  UE Dressing with Supervision.  LE Dressing with Supervision.  Grooming while standing at sink with Supervision.  Toileting from toilet with Supervision for hygiene and clothing management.   Toilet transfer to toilet with Supervision.                       Time Tracking:     OT Date of Treatment: 10/26/18  OT Start Time: 1430  OT Stop Time: 1453  OT Total Time (min): 23 min    Billable Minutes:Self Care/Home Management 15  Therapeutic Exercise 8    Laura corado, OT  10/26/2018

## 2018-10-27 LAB
ALBUMIN SERPL BCP-MCNC: 2.6 G/DL
ALP SERPL-CCNC: 102 U/L
ALT SERPL W/O P-5'-P-CCNC: 18 U/L
ANION GAP SERPL CALC-SCNC: 12 MMOL/L
AST SERPL-CCNC: 21 U/L
BASOPHILS # BLD AUTO: 0.01 K/UL
BASOPHILS # BLD AUTO: 0.01 K/UL
BASOPHILS NFR BLD: 0.2 %
BASOPHILS NFR BLD: 0.2 %
BILIRUB SERPL-MCNC: 3.1 MG/DL
BLD PROD TYP BPU: NORMAL
BLOOD UNIT EXPIRATION DATE: NORMAL
BLOOD UNIT TYPE CODE: 6200
BLOOD UNIT TYPE: NORMAL
BUN SERPL-MCNC: 116 MG/DL
CALCIUM SERPL-MCNC: 9 MG/DL
CHLORIDE SERPL-SCNC: 95 MMOL/L
CO2 SERPL-SCNC: 26 MMOL/L
CODING SYSTEM: NORMAL
CREAT SERPL-MCNC: 2.2 MG/DL
DIFFERENTIAL METHOD: ABNORMAL
DIFFERENTIAL METHOD: ABNORMAL
DISPENSE STATUS: NORMAL
EOSINOPHIL # BLD AUTO: 0 K/UL
EOSINOPHIL # BLD AUTO: 0.1 K/UL
EOSINOPHIL NFR BLD: 0.4 %
EOSINOPHIL NFR BLD: 1.6 %
ERYTHROCYTE [DISTWIDTH] IN BLOOD BY AUTOMATED COUNT: 18.7 %
ERYTHROCYTE [DISTWIDTH] IN BLOOD BY AUTOMATED COUNT: 20.6 %
EST. GFR  (AFRICAN AMERICAN): 25.6 ML/MIN/1.73 M^2
EST. GFR  (NON AFRICAN AMERICAN): 22.2 ML/MIN/1.73 M^2
GLUCOSE SERPL-MCNC: 97 MG/DL
HCT VFR BLD AUTO: 20.2 %
HCT VFR BLD AUTO: 20.4 %
HCT VFR BLD AUTO: 24.7 %
HGB BLD-MCNC: 6.6 G/DL
HGB BLD-MCNC: 6.7 G/DL
HGB BLD-MCNC: 8.1 G/DL
IMM GRANULOCYTES # BLD AUTO: 0.04 K/UL
IMM GRANULOCYTES # BLD AUTO: 0.09 K/UL
IMM GRANULOCYTES NFR BLD AUTO: 0.9 %
IMM GRANULOCYTES NFR BLD AUTO: 1.7 %
INR PPP: 1
LYMPHOCYTES # BLD AUTO: 0.2 K/UL
LYMPHOCYTES # BLD AUTO: 0.2 K/UL
LYMPHOCYTES NFR BLD: 3.4 %
LYMPHOCYTES NFR BLD: 5.6 %
MAGNESIUM SERPL-MCNC: 1.6 MG/DL
MCH RBC QN AUTO: 29.8 PG
MCH RBC QN AUTO: 30.6 PG
MCHC RBC AUTO-ENTMCNC: 32.8 G/DL
MCHC RBC AUTO-ENTMCNC: 32.8 G/DL
MCV RBC AUTO: 91 FL
MCV RBC AUTO: 93 FL
MONOCYTES # BLD AUTO: 0.2 K/UL
MONOCYTES # BLD AUTO: 0.3 K/UL
MONOCYTES NFR BLD: 3.2 %
MONOCYTES NFR BLD: 6.3 %
NEUTROPHILS # BLD AUTO: 3.6 K/UL
NEUTROPHILS # BLD AUTO: 4.8 K/UL
NEUTROPHILS NFR BLD: 85.4 %
NEUTROPHILS NFR BLD: 91.1 %
NRBC BLD-RTO: 0 /100 WBC
NRBC BLD-RTO: 0 /100 WBC
PHOSPHATE SERPL-MCNC: 3.9 MG/DL
PLATELET # BLD AUTO: 71 K/UL
PLATELET # BLD AUTO: 75 K/UL
PMV BLD AUTO: 11.1 FL
PMV BLD AUTO: 11.1 FL
POCT GLUCOSE: 119 MG/DL (ref 70–110)
POCT GLUCOSE: 164 MG/DL (ref 70–110)
POCT GLUCOSE: 178 MG/DL (ref 70–110)
POCT GLUCOSE: 199 MG/DL (ref 70–110)
POCT GLUCOSE: 208 MG/DL (ref 70–110)
POCT GLUCOSE: 211 MG/DL (ref 70–110)
POCT GLUCOSE: 215 MG/DL (ref 70–110)
POTASSIUM SERPL-SCNC: 3.2 MMOL/L
PROT SERPL-MCNC: 4 G/DL
PROTHROMBIN TIME: 10.4 SEC
RBC # BLD AUTO: 2.25 M/UL
RBC # BLD AUTO: 2.65 M/UL
SODIUM SERPL-SCNC: 133 MMOL/L
TACROLIMUS BLD-MCNC: 2.2 NG/ML
TRANS ERYTHROCYTES VOL PATIENT: NORMAL ML
WBC # BLD AUTO: 4.27 K/UL
WBC # BLD AUTO: 5.24 K/UL

## 2018-10-27 PROCEDURE — 85018 HEMOGLOBIN: CPT

## 2018-10-27 PROCEDURE — 20600001 HC STEP DOWN PRIVATE ROOM

## 2018-10-27 PROCEDURE — 25000003 PHARM REV CODE 250: Performed by: NURSE PRACTITIONER

## 2018-10-27 PROCEDURE — 84100 ASSAY OF PHOSPHORUS: CPT

## 2018-10-27 PROCEDURE — P9021 RED BLOOD CELLS UNIT: HCPCS

## 2018-10-27 PROCEDURE — 25000003 PHARM REV CODE 250: Performed by: STUDENT IN AN ORGANIZED HEALTH CARE EDUCATION/TRAINING PROGRAM

## 2018-10-27 PROCEDURE — 99233 SBSQ HOSP IP/OBS HIGH 50: CPT | Mod: 24,,, | Performed by: NURSE PRACTITIONER

## 2018-10-27 PROCEDURE — 85014 HEMATOCRIT: CPT

## 2018-10-27 PROCEDURE — 80197 ASSAY OF TACROLIMUS: CPT

## 2018-10-27 PROCEDURE — 80053 COMPREHEN METABOLIC PANEL: CPT

## 2018-10-27 PROCEDURE — 25000003 PHARM REV CODE 250: Performed by: TRANSPLANT SURGERY

## 2018-10-27 PROCEDURE — C9113 INJ PANTOPRAZOLE SODIUM, VIA: HCPCS | Performed by: PHYSICIAN ASSISTANT

## 2018-10-27 PROCEDURE — 83735 ASSAY OF MAGNESIUM: CPT

## 2018-10-27 PROCEDURE — 25000003 PHARM REV CODE 250: Performed by: PHYSICIAN ASSISTANT

## 2018-10-27 PROCEDURE — 63600175 PHARM REV CODE 636 W HCPCS: Performed by: PHYSICIAN ASSISTANT

## 2018-10-27 PROCEDURE — 85025 COMPLETE CBC W/AUTO DIFF WBC: CPT | Mod: 91

## 2018-10-27 PROCEDURE — 85610 PROTHROMBIN TIME: CPT

## 2018-10-27 PROCEDURE — 63600175 PHARM REV CODE 636 W HCPCS: Performed by: NURSE PRACTITIONER

## 2018-10-27 PROCEDURE — 63600175 PHARM REV CODE 636 W HCPCS: Performed by: STUDENT IN AN ORGANIZED HEALTH CARE EDUCATION/TRAINING PROGRAM

## 2018-10-27 RX ORDER — TACROLIMUS 0.5 MG/1
0.5 CAPSULE ORAL 2 TIMES DAILY
Status: DISCONTINUED | OUTPATIENT
Start: 2018-10-27 | End: 2018-10-29

## 2018-10-27 RX ORDER — HYDROCODONE BITARTRATE AND ACETAMINOPHEN 500; 5 MG/1; MG/1
TABLET ORAL
Status: DISCONTINUED | OUTPATIENT
Start: 2018-10-27 | End: 2018-10-30

## 2018-10-27 RX ORDER — FUROSEMIDE 10 MG/ML
40 INJECTION INTRAMUSCULAR; INTRAVENOUS 2 TIMES DAILY
Status: COMPLETED | OUTPATIENT
Start: 2018-10-27 | End: 2018-10-27

## 2018-10-27 RX ADMIN — TACROLIMUS 0.5 MG: 0.5 CAPSULE ORAL at 09:10

## 2018-10-27 RX ADMIN — AMOXICILLIN 500 MG: 500 CAPSULE ORAL at 09:10

## 2018-10-27 RX ADMIN — PANTOPRAZOLE SODIUM 40 MG: 40 INJECTION, POWDER, FOR SOLUTION INTRAVENOUS at 09:10

## 2018-10-27 RX ADMIN — FLUCONAZOLE 200 MG: 200 TABLET ORAL at 09:10

## 2018-10-27 RX ADMIN — FUROSEMIDE 40 MG: 10 INJECTION, SOLUTION INTRAMUSCULAR; INTRAVENOUS at 10:10

## 2018-10-27 RX ADMIN — MAGNESIUM OXIDE TAB 400 MG (241.3 MG ELEMENTAL MG) 800 MG: 400 (241.3 MG) TAB at 09:10

## 2018-10-27 RX ADMIN — MYCOPHENOLATE MOFETIL 1000 MG: 250 CAPSULE ORAL at 09:10

## 2018-10-27 RX ADMIN — INSULIN ASPART 1 UNITS: 100 INJECTION, SOLUTION INTRAVENOUS; SUBCUTANEOUS at 11:10

## 2018-10-27 RX ADMIN — INSULIN ASPART 14 UNITS: 100 INJECTION, SOLUTION INTRAVENOUS; SUBCUTANEOUS at 01:10

## 2018-10-27 RX ADMIN — MIRTAZAPINE 7.5 MG: 7.5 TABLET ORAL at 09:10

## 2018-10-27 RX ADMIN — MIDODRINE HYDROCHLORIDE 15 MG: 5 TABLET ORAL at 09:10

## 2018-10-27 RX ADMIN — DOCUSATE SODIUM 100 MG: 100 CAPSULE, LIQUID FILLED ORAL at 09:10

## 2018-10-27 RX ADMIN — POLYETHYLENE GLYCOL 3350 17 G: 17 POWDER, FOR SOLUTION ORAL at 09:10

## 2018-10-27 RX ADMIN — INSULIN ASPART 14 UNITS: 100 INJECTION, SOLUTION INTRAVENOUS; SUBCUTANEOUS at 08:10

## 2018-10-27 RX ADMIN — BISACODYL 10 MG: 5 TABLET, COATED ORAL at 09:10

## 2018-10-27 RX ADMIN — FUROSEMIDE 40 MG: 10 INJECTION, SOLUTION INTRAMUSCULAR; INTRAVENOUS at 09:10

## 2018-10-27 RX ADMIN — MIDODRINE HYDROCHLORIDE 15 MG: 5 TABLET ORAL at 05:10

## 2018-10-27 RX ADMIN — URSODIOL 300 MG: 300 CAPSULE ORAL at 09:10

## 2018-10-27 RX ADMIN — OXYCODONE HYDROCHLORIDE 5 MG: 5 TABLET ORAL at 09:10

## 2018-10-27 RX ADMIN — ENTECAVIR 0.5 MG: 0.5 TABLET ORAL at 09:10

## 2018-10-27 RX ADMIN — TACROLIMUS 0.5 MG: 0.5 CAPSULE ORAL at 05:10

## 2018-10-27 RX ADMIN — BISACODYL 10 MG: 10 SUPPOSITORY RECTAL at 06:10

## 2018-10-27 RX ADMIN — OXYCODONE HYDROCHLORIDE 5 MG: 5 TABLET ORAL at 10:10

## 2018-10-27 RX ADMIN — LEVOFLOXACIN 250 MG: 250 TABLET, FILM COATED ORAL at 09:10

## 2018-10-27 RX ADMIN — INSULIN ASPART 14 UNITS: 100 INJECTION, SOLUTION INTRAVENOUS; SUBCUTANEOUS at 09:10

## 2018-10-27 RX ADMIN — PREDNISONE 10 MG: 10 TABLET ORAL at 09:10

## 2018-10-27 NOTE — CARE UPDATE
BG goal 140-180. BG at or slightly above goal; prandial excursions.    Continue novolog 14 units with meals. BG monitoring ac/hs and low dose correction scale.       ** Please call Endocrine for any BG related issues **

## 2018-10-27 NOTE — PROGRESS NOTES
[facility]  Liver Transplant  Progress Note    Patient Name: Scarlett Reddy  MRN: [id number]  Admission Date: 10/1/2018  Hospital Length of Stay: 25 days  Code Status: Full Code  Primary Care Provider: Primary Doctor No  Post-Operative Day: 22    ORGAN:   LIVER  Disease Etiology: Cirrhosis: Type B and D  Donor Type:    - Brain Death  CDC High Risk:   No  Donor CMV Status:   Donor CMV Status: Positive  Donor HBcAB:   Negative  Donor HCV Status:   Negative  Donor HBV SETH: Negative  Donor HCV SETH: Negative  Whole or Partial: Whole Liver  Biliary Anastomosis: End to End  Arterial Anatomy: Replaced Left Hepatic and Right Hepatic  Subjective:     History of Present Illness:  Ms. Reddy is a 67 y/o female with PMH of ESLD secondary Hep B and D.  Listed for liver transplant with MELD 23.  Paracentesis 10/1 with 5L removed, no fluid sent for analysis.  Morning labs significant for hyponatremia, Na 119.  Pt admitted to LTS for sodium monitoring.        Hospital Course: 67 y/o F h/o HBV/delta cirrhosis s/p DBDLT 10/5/18 (CMV D+/R+, steroid induction, MMF/tacro/pred maintenance) with take back on 10/6 2/2 hyperbilirubinema and bilious VALORIE drainage, no leak identified. Post-op course c/b hypercapneic and hypoxic respiratory failure and was reintubated though quickly extubated now doing well. Liver bx (10/6) sig for Zone 3 hemorrhage and collapse, in addition to cholestasis. Transferred from ICU on POD #4. ID consulted to comment on positive diphtheroid culture from ascitic fluid (likely skin colonization) as well as ESBL Klebsiella pneumoniae in urine culture (10/4).  Pt asymptomatic and cell count from ascitic fluid unremarkable. Per ID recs, no need to treat diphtheroids or asymptomatic ESBL Kleb pneumo bacteriuria though pt has had 6 days of therapy which would cover these organisms. Mild peritransplant ascites- repeat US 10/6 with increased arterial resistive indices. Repeat US 10/9 w/ continued  elevation in RIs and fluid collections. LFTs trending down nicely.     Acute gradual worsening of renal function s/p OLT.Creatinine on arrival 0.8 and has been up trending. Nephrology consulted for post-op EVA. Remains on renal/low K diet. Patient continued to be diuresed with lasix. BUN and Cr remain elevated requiring multiple rounds of dialysis (10/15, 10/17, 10/20). No significant improvements in kidney function noted w/ SLED. Pt remains anemic requiring multiple blood transfusions (10/14, 10/16, 10/18, 10/19, 10/22).  Pt reported dark stools 10/15 but color has normalized. FOBT+. EGD 10/18 consistent with ulcerated mucosa in duodenum s/p coagulation. Biopsy pending. Concern for H. Pylori. Started Amoxicillin/levofloxacin (10/19). Remains on Protonix 40 mg BID. Will cont close monitoring H/H, transfuse as needed with goal Hb > 7.0. Decreased platelets, improvements w/ 2 u plts (10/18, 10/19) and DDAVP.   Of note, Urine cx + Klebsiella pneumoniae (10/13). Received 3 day course of ciprofloxacin, dc'd 10/19.   On 10/19 pm pt c/o crushing chest pain. Troponin 0.094, likely 2/2 ischemic stress. EKG NSR.     Interval history: H&H 6.6/20.2 this AM. Pt denies melena. 1 unit PRBCs ordered. Cr/BUM remain elevated. No need for HD today. Diurese with lasix 40 mg IV x 2 doses. Good uop. Continue to hold heparin sq for possible ongoing GI bleed. Monitor.     Scheduled Meds:   amoxicillin  500 mg Oral Q12H    docusate sodium  100 mg Oral Daily    entecavir  0.5 mg Oral Q72H    ergocalciferol  50,000 Units Oral Q7 Days    fluconazole  200 mg Oral Daily    furosemide  40 mg Intravenous BID    insulin aspart U-100  14 Units Subcutaneous TIDWM    levoFLOXacin  250 mg Oral Daily    magnesium oxide  800 mg Oral BID    midodrine  15 mg Oral TID    mirtazapine  7.5 mg Oral QHS    mycophenolate  1,000 mg Oral BID    pantoprazole  40 mg Intravenous BID    polyethylene glycol  17 g Oral Daily    predniSONE  10 mg Oral  Daily    Followed by    [START ON 10/31/2018] predniSONE  5 mg Oral Daily    Followed by    [START ON 11/7/2018] predniSONE  2.5 mg Oral Daily    sulfamethoxazole-trimethoprim 400-80mg  1 tablet Oral Every Mon, Wed, Fri    tacrolimus  0.5 mg Oral BID    ursodiol  300 mg Oral BID    valGANciclovir  450 mg Oral Twice Weekly     Continuous Infusions:  PRN Meds:sodium chloride, bisacodyl, bisacodyl, calcium carbonate, dextrose 50%, dextrose 50%, glucagon (human recombinant), glucose, glucose, insulin aspart U-100, ondansetron, ondansetron, oxyCODONE, oxyCODONE, polyethylene glycol, promethazine (PHENERGAN) IVPB, sodium chloride 0.9%    Review of Systems   Constitutional: Positive for activity change, appetite change (decreased) and fatigue. Negative for chills and fever.   HENT: Negative for congestion and facial swelling.    Eyes: Negative for pain, discharge and visual disturbance.   Respiratory: Negative for cough, chest tightness, shortness of breath and wheezing.    Cardiovascular: Positive for leg swelling. Negative for chest pain and palpitations.   Gastrointestinal: Positive for abdominal distention and abdominal pain. Negative for constipation, diarrhea and vomiting.   Endocrine: Negative.    Genitourinary: Negative for decreased urine volume, difficulty urinating and dysuria.   Musculoskeletal: Negative for arthralgias and back pain.   Skin: Positive for wound.   Allergic/Immunologic: Positive for immunocompromised state.   Neurological: Positive for weakness. Negative for tremors and headaches.   Psychiatric/Behavioral: Negative for agitation and confusion.     Objective:     Vital Signs (Most Recent):  Temp: 97.6 °F (36.4 °C) (10/27/18 1100)  Pulse: 89 (10/27/18 1200)  Resp: 14 (10/27/18 1200)  BP: (!) 112/46 (10/27/18 1200)  SpO2: 99 % (10/27/18 1200) Vital Signs (24h Range):  Temp:  [97.2 °F (36.2 °C)-98.2 °F (36.8 °C)] 97.6 °F (36.4 °C)  Pulse:  [] 89  Resp:  [11-18] 14  SpO2:  [97 %-100 %] 99  %  BP: (110-136)/(45-84) 112/46     Weight: 73.4 kg (161 lb 11.7 oz)  Body mass index is 28.65 kg/m².    Intake/Output - Last 3 Shifts       10/25 0700 - 10/26 0659 10/26 0700 - 10/27 0659 10/27 0700 - 10/28 0659    P.O. 420 915 390    Total Intake(mL/kg) 420 (5.7) 915 (12.5) 390 (5.3)    Urine (mL/kg/hr) 1175 (0.7) 950 (0.5) 500 (1.2)    Emesis/NG output  0 0    Other  0 0    Stool 0 0 0    Blood  0 0    Total Output 1175 950 500    Net -755 -35 -110           Urine Occurrence 0 x 0 x 1 x    Stool Occurrence 2 x 0 x 0 x    Emesis Occurrence  0 x 0 x          Physical Exam   Constitutional: She is oriented to person, place, and time.   Temporal and distal extremity muscle wasting   HENT:   Head: Normocephalic and atraumatic.   Eyes: EOM are normal. Pupils are equal, round, and reactive to light. Scleral icterus is present.   Neck: Normal range of motion.   Cardiovascular: Normal rate and regular rhythm.   No murmur heard.  Pulmonary/Chest: Effort normal and breath sounds normal. No respiratory distress. She has no wheezes.   Abdominal: Soft. Bowel sounds are normal. She exhibits distension. There is tenderness.   Chevron incision w/ staples, CDI   Musculoskeletal: Normal range of motion. She exhibits edema (3+ BLE).   Neurological: She is alert and oriented to person, place, and time.   Skin: Skin is warm and dry.   Nursing note and vitals reviewed.      Laboratory:  Immunosuppressants         Stop Route Frequency     tacrolimus capsule 0.5 mg      -- Oral 2 times daily     mycophenolate capsule 1,000 mg      -- Oral 2 times daily        CBC:   Recent Labs   Lab 10/27/18  0518 10/27/18  0748   WBC 4.27  --    RBC 2.25*  --    HGB 6.7* 6.6*   HCT 20.4* 20.2*   PLT 75*  --    MCV 91  --    MCH 29.8  --    MCHC 32.8  --      CMP:   Recent Labs   Lab 10/27/18  0518   GLU 97   CALCIUM 9.0   ALBUMIN 2.6*   PROT 4.0*   *   K 3.2*   CO2 26   CL 95   *   CREATININE 2.2*   ALKPHOS 102   ALT 18   AST 21   BILITOT  3.1*     Labs within the past 24 hours have been reviewed.    Diagnostic Results:  I have personally reviewed all pertinent imaging studies.    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   - POD 1 US: increased arterial resistive indices, suggestive of edema vs cute rejection vs congestion.  - Repeat US 10/9 with continued elevation of RIs.  - .LFTs stable.  - T bili trending down.  - repeat liver US (10/11): elevated RIs.           Hyponatremia    - Na 119 on admit.  - Na was improving post transplant w HD.  - Na stable at this time. Monitoring w/ daily labs.            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  -Tx w/ Entecavir- dose changed to q72 hours given renal function.         Ascites    - paracentesis 10/1, 5L removed, no fluid sent for analysis.  - lasix 40 mg IV x 2 doses.          Severe malnutrition    - dietary consulted.   - temporal and distal extremity muscle wasting and edema.  - encourage supplements and to increase nutritional intake .  - pt on renal/low K+ diet.     Type 2 diabetes mellitus with diabetic polyneuropathy, with long-term current use of insulin    - continue home regimen.  - endocrine consulted.  - Pt off insulin drip at this time.  Apprec endo recs.        Duodenal ulcer    - EGD 10/18- sig for ulcerated duodenal mucosa.   - Biopsy pending from 10/18.  - Cont protonix 40 BID.  - Started H. Pylori tx 10/19: Amoxicillin/Levo.       UTI (urinary tract infection)    - Urine Cx 10/15 w/ Klebsiella pneumonia.  - Started Ciprofloxacin 10/17. Dc'd 10/19. Coverage w/ triple therapy for h. Pylori.  - Pt asymptomatic at this time.       Acute blood loss anemia    - H/H cont to fluctuate.   - FOBT +  - EGD 10/18- showed diffuse bleeding with ulcerated duodenal mucosa   - Monitoring  w/ CBC q 12 to assess the need for transfusion.  - Transfuse 1 unit PRBCs today. H&H 6.6/20.2.            Post LT - Acute renal failure    - Creatinine on arrival 0.8 and trended up.  - BUN/cr (10/11/18): 91/1.9.   - Tacrolimus 26.9 (10/08).  - Nephrology consulted.  - Renal US (10/11): no hydronephrosis.  - Echo 10/12: diastolic dysfunction, likely 2/2 to ARF.  - pt on renal diet w 1500 ml FR.  - CRRT 10/14; SLED 10/17, 10/20. --> no improvements noted in kidney fxn. Per nephrology, does not think SLED provides any benefit.  - Continue with lasix as patient needs diuresis. Lasix 40 mg IV x 2 doses today.      Hypervolemia associated with renal insufficiency    - Diuresed w/ Lasix 100mg x 1 (10/12).  - CRRT 10/14.  .    - SLED 10/17, 10/20.  - Diuresed w/ Lasix 80 BID-- stopped (10/23).  - Will assess the need for diuresis on a daily basis.  - Lasix 40mg IV x 2, 10/27.       EVA (acute kidney injury)           Metabolic acidosis    - CRRT 10/14.  - SLED 10/17, 10/20.         At risk for opportunistic infections    - Valcyte for CMV prophylaxis.  - Bactrim for PCP prophylaxis (MW).  - Fluconazole for fungal prophylaxis.        Long-term use of immunosuppressant medication    -  Cellcept/Prograf/pred.  -  Held prograf 10/8 for elevated levels.  -  Resumed Prograf 10/10/19. Will adjust as needed.   -  Will monitor for signs of toxic side effects, check daily tacrolimus troughs, and change meds accordingly.       Prophylactic immunotherapy    - See long term use immunosuppresion.       Weakness    - see physical decond.       Physical deconditioning    - PT/OT consulted.  - Recommend home health PT and rolling walker at discharge.       Hypotension    - continue Midodrine 15 mg TID. Monitor.       Anemia requiring transfusions    - Fluctuating H/H.    - Transfuse 10/14 and 10/16 (1 unit); 10/18 (2 units); 10/19 (1 unit), 10/22 (1 unit).  - Decreased platelets -- 1 u plt given 10/18.  - DDAVP given 10/18 and  10/19.  - FOBT +.  - LDH elevated, Hapto < 10.  - EGD 10/18: ulcerated duodenal mucosa.   - Monitoring w/ CBC q12hr to assess the need for transfusion.  - H&H 6.6/20.2. Transfuse 1 unit PRBCs. Repeat H&H following transfusion. Pt may need additional unit of prbc.            VTE Risk Mitigation (From admission, onward)        Ordered     Place sequential compression device  Until discontinued      10/05/18 0709          The patients clinical status was discussed at multidisplinary rounds, involving transplant surgery, transplant medicine, pharmacy, nursing, nutrition, and social work    Discharge Planning: not a candidate for dc at this time.       Kimber Valle NP  Liver Transplant  [facility]

## 2018-10-27 NOTE — ASSESSMENT & PLAN NOTE
- Fluctuating H/H.    - Transfuse 10/14 and 10/16 (1 unit); 10/18 (2 units); 10/19 (1 unit), 10/22 (1 unit).  - Decreased platelets -- 1 u plt given 10/18.  - DDAVP given 10/18 and 10/19.  - FOBT +.  - LDH elevated, Hapto < 10.  - EGD 10/18: ulcerated duodenal mucosa.   - Monitoring w/ CBC q12hr to assess the need for transfusion.  - H&H 6.6/20.2. Transfuse 1 unit PRBCs. Repeat H&H following transfusion. Pt may need additional unit of prbc.

## 2018-10-27 NOTE — SUBJECTIVE & OBJECTIVE
"Interval HPI:   Overnight events: remains in TSU. BG at or slightly above goal overnight with prandial coverage and required correction scale coverage. Prednisone 10 mg daily; standard steroid taper.   Eatin%  Nausea: No  Hypoglycemia and intervention: No  Fever: No  TPN and/or TF: No    /66   Pulse 89   Temp 98.1 °F (36.7 °C)   Resp 11   Ht 5' 3" (1.6 m)   Wt 73.4 kg (161 lb 13.1 oz)   LMP  (LMP Unknown)   SpO2 96%   Breastfeeding? No   BMI 28.66 kg/m²     Labs Reviewed and Include    Recent Labs   Lab 10/28/18  0541   *   CALCIUM 8.7   ALBUMIN 2.6*   PROT 4.3*   *   K 3.1*   CO2 27   CL 94*   *   CREATININE 2.2*   ALKPHOS 111   ALT 18   AST 23   BILITOT 3.7*     Lab Results   Component Value Date    WBC 3.84 (L) 10/28/2018    HGB 7.8 (L) 10/28/2018    HCT 24.0 (L) 10/28/2018    MCV 91 10/28/2018    PLT 77 (L) 10/28/2018     No results for input(s): TSH, FREET4 in the last 168 hours.  Lab Results   Component Value Date    HGBA1C 6.0 (H) 10/04/2018       Nutritional status:   Body mass index is 28.66 kg/m².  Lab Results   Component Value Date    ALBUMIN 2.6 (L) 10/28/2018    ALBUMIN 2.6 (L) 10/27/2018    ALBUMIN 3.0 (L) 10/26/2018     Lab Results   Component Value Date    PREALBUMIN 12 (L) 2018       Estimated Creatinine Clearance: 23.2 mL/min (A) (based on SCr of 2.2 mg/dL (H)).    Accu-Checks  Recent Labs     10/25/18  1435 10/25/18  1908 10/26/18  0748 10/26/18  1201 10/26/18  1809 10/26/18  2219 10/27/18  0905 10/27/18  1320 10/27/18  2000 10/27/18  2322   POCTGLUCOSE 235* 199* 143* 245* 208* 211* 119* 178* 164* 215*       Current Medications and/or Treatments Impacting Glycemic Control  Immunotherapy:    Immunosuppressants         Stop Route Frequency     tacrolimus capsule 0.5 mg      -- Oral 2 times daily     mycophenolate capsule 1,000 mg      -- Oral 2 times daily        Steroids:   Hormones (From admission, onward)    Start     Stop Route Frequency Ordered    " 11/07/18 0900  predniSONE tablet 2.5 mg      11/14 0859 Oral Daily 10/17/18 0943    10/31/18 0900  predniSONE tablet 5 mg      11/07 0859 Oral Daily 10/17/18 0943    10/24/18 0900  predniSONE tablet 10 mg      10/31 0859 Oral Daily 10/17/18 0943    10/17/18 0945  predniSONE tablet 15 mg      10/24 0859 Oral Daily 10/17/18 0943        Pressors:    Autonomic Drugs (From admission, onward)    Start     Stop Route Frequency Ordered    10/10/18 1500  midodrine tablet 15 mg      -- Oral 3 times daily 10/10/18 1122    10/06/18 1617  rocuronium (ZEMURON) 10 mg/mL injection     Comments:  Created by cabinet override    10/07 0429   10/06/18 1617        Hyperglycemia/Diabetes Medications:   Antihyperglycemics (From admission, onward)    Start     Stop Route Frequency Ordered    10/26/18 0715  insulin aspart U-100 pen 14 Units      -- SubQ 3 times daily with meals 10/25/18 1716    10/14/18 0925  insulin aspart U-100 pen 0-5 Units      -- SubQ Before meals & nightly PRN 10/14/18 0825    10/07/18 1200  insulin regular (Humulin R) 100 Units in sodium chloride 0.9% 100 mL infusion      10/13 2100 IV Continuous 10/07/18 1057

## 2018-10-27 NOTE — ASSESSMENT & PLAN NOTE
Managed per primary team  Avoid hypoglycemia    Recent Labs   Lab 10/27/18  0518   ALT 18   AST 21   ALKPHOS 102   BILITOT 3.1*   PROT 4.0*   ALBUMIN 2.6*

## 2018-10-27 NOTE — ASSESSMENT & PLAN NOTE
-  Cellcept/Prograf/pred.  -  Held prograf 10/8 for elevated levels.  -  Resumed Prograf 10/10/19. Will adjust as needed.   -  Will monitor for signs of toxic side effects, check daily tacrolimus troughs, and change meds accordingly.

## 2018-10-27 NOTE — ASSESSMENT & PLAN NOTE
- H/H cont to fluctuate.   - FOBT +  - EGD 10/18- showed diffuse bleeding with ulcerated duodenal mucosa   - Monitoring w/ CBC q 12 to assess the need for transfusion.  - Transfuse 1 unit PRBCs today. H&H 6.6/20.2.

## 2018-10-27 NOTE — ASSESSMENT & PLAN NOTE
- dietary consulted.   - temporal and distal extremity muscle wasting and edema.  - encourage supplements and to increase nutritional intake .  - pt on renal/low K+ diet.

## 2018-10-27 NOTE — PROGRESS NOTES
H/H 8.1/25.1 @ 6PM last night, this AM down to 6.7/20.4. HERBERT Valle DNP notified while making rounds this AM. VSS, /72, HR- 93. Will continue to monitor.

## 2018-10-27 NOTE — ASSESSMENT & PLAN NOTE
- continue home regimen.  - endocrine consulted.  - Pt off insulin drip at this time.  Apprec endo recs.

## 2018-10-27 NOTE — PLAN OF CARE
Problem: Patient Care Overview  Goal: Plan of Care Review  Outcome: Ongoing (interventions implemented as appropriate)  AAOx4, VSS, SpO- >96% on RA, tele- HR normal sinus rhythm- sinus tach on exertion, AC/HS- night time sugar= 211, 1 U insulin given, isolation precautions continued for + ESBL in urine  Afebrile overnight- WBC 6.31, amoxicillin 500 PO q12h continued  H/H= 8.125.1, IV protonix continued   40mg IVP lasix given BID, refer to chart for UOP, Cr yesterday AM 2.2. Pasquale hose in use, heparin d/c'd   Up with 2x assist + walker in use.  Bed in lowest position, non skid socks worn, call light within reach. Will continue to monitor.

## 2018-10-27 NOTE — ASSESSMENT & PLAN NOTE
- Diuresed w/ Lasix 100mg x 1 (10/12).  - CRRT 10/14.  .    - SLED 10/17, 10/20.  - Diuresed w/ Lasix 80 BID-- stopped (10/23).  - Will assess the need for diuresis on a daily basis.  - Lasix 40mg IV x 2, 10/27.

## 2018-10-27 NOTE — ASSESSMENT & PLAN NOTE
- see physical decond.     DOCUMENTATION QUERY    PROVIDERS: Please select “Cosign w/ note”to reply to query.    Dr. Velasquez,    To better represent the severity of illness of your patient, please review the following information and exercise your independent professional judgment in responding to this query.     SIRS criterias WBC 16.8 / 17.5 and RR 21 /22 are noted in the Lab Results and Vital Signs Flowsheet. Based upon the clinical findings, risk factors, and treatment, can a diagnosis be provided to support this finding?     • Sepsis present on admission (specify causative organism if known and any associated organ dysfunction if known)  • Sepsis developed after admission  • Sepsis has been ruled out  • SIRS that is unrelated to any infection  • SIRS of non-infectious origin with acute organ dysfunction  • Other explanation of clinical findings  • Findings of no clinical significance  • Unable to determine    The medical record reflects the following:   Clinical Findings  WBC 16.8 / 17.5 noted in Lab Results   RR 21 / 22 in Vital Signs flowsheet   Treatment  Was on Vanco and Rocephin IV    Risk Factors  CHF, COPD poss acute exacerbation   Location within medical record  Lab Results  and Vital Signs Flowsheet     Thank you,   Michelle Lutz RN  Clinical   [phone]

## 2018-10-27 NOTE — ASSESSMENT & PLAN NOTE
- EGD 10/18- sig for ulcerated duodenal mucosa.   - Biopsy pending from 10/18.  - Cont protonix 40 BID.  - Started H. Pylori tx 10/19: Amoxicillin/Levo.

## 2018-10-27 NOTE — SUBJECTIVE & OBJECTIVE
Scheduled Meds:   amoxicillin  500 mg Oral Q12H    docusate sodium  100 mg Oral Daily    entecavir  0.5 mg Oral Q72H    ergocalciferol  50,000 Units Oral Q7 Days    fluconazole  200 mg Oral Daily    furosemide  40 mg Intravenous BID    insulin aspart U-100  14 Units Subcutaneous TIDWM    levoFLOXacin  250 mg Oral Daily    magnesium oxide  800 mg Oral BID    midodrine  15 mg Oral TID    mirtazapine  7.5 mg Oral QHS    mycophenolate  1,000 mg Oral BID    pantoprazole  40 mg Intravenous BID    polyethylene glycol  17 g Oral Daily    predniSONE  10 mg Oral Daily    Followed by    [START ON 10/31/2018] predniSONE  5 mg Oral Daily    Followed by    [START ON 11/7/2018] predniSONE  2.5 mg Oral Daily    sulfamethoxazole-trimethoprim 400-80mg  1 tablet Oral Every Mon, Wed, Fri    tacrolimus  0.5 mg Oral BID    ursodiol  300 mg Oral BID    valGANciclovir  450 mg Oral Twice Weekly     Continuous Infusions:  PRN Meds:sodium chloride, bisacodyl, bisacodyl, calcium carbonate, dextrose 50%, dextrose 50%, glucagon (human recombinant), glucose, glucose, insulin aspart U-100, ondansetron, ondansetron, oxyCODONE, oxyCODONE, polyethylene glycol, promethazine (PHENERGAN) IVPB, sodium chloride 0.9%    Review of Systems   Constitutional: Positive for activity change, appetite change (decreased) and fatigue. Negative for chills and fever.   HENT: Negative for congestion and facial swelling.    Eyes: Negative for pain, discharge and visual disturbance.   Respiratory: Negative for cough, chest tightness, shortness of breath and wheezing.    Cardiovascular: Positive for leg swelling. Negative for chest pain and palpitations.   Gastrointestinal: Positive for abdominal distention and abdominal pain. Negative for constipation, diarrhea and vomiting.   Endocrine: Negative.    Genitourinary: Negative for decreased urine volume, difficulty urinating and dysuria.   Musculoskeletal: Negative for arthralgias and back pain.   Skin:  Positive for wound.   Allergic/Immunologic: Positive for immunocompromised state.   Neurological: Positive for weakness. Negative for tremors and headaches.   Psychiatric/Behavioral: Negative for agitation and confusion.     Objective:     Vital Signs (Most Recent):  Temp: 97.6 °F (36.4 °C) (10/27/18 1100)  Pulse: 89 (10/27/18 1200)  Resp: 14 (10/27/18 1200)  BP: (!) 112/46 (10/27/18 1200)  SpO2: 99 % (10/27/18 1200) Vital Signs (24h Range):  Temp:  [97.2 °F (36.2 °C)-98.2 °F (36.8 °C)] 97.6 °F (36.4 °C)  Pulse:  [] 89  Resp:  [11-18] 14  SpO2:  [97 %-100 %] 99 %  BP: (110-136)/(45-84) 112/46     Weight: 73.4 kg (161 lb 11.7 oz)  Body mass index is 28.65 kg/m².    Intake/Output - Last 3 Shifts       10/25 0700 - 10/26 0659 10/26 0700 - 10/27 0659 10/27 0700 - 10/28 0659    P.O. 420 915 390    Total Intake(mL/kg) 420 (5.7) 915 (12.5) 390 (5.3)    Urine (mL/kg/hr) 1175 (0.7) 950 (0.5) 500 (1.2)    Emesis/NG output  0 0    Other  0 0    Stool 0 0 0    Blood  0 0    Total Output 1175 950 500    Net -755 -35 -110           Urine Occurrence 0 x 0 x 1 x    Stool Occurrence 2 x 0 x 0 x    Emesis Occurrence  0 x 0 x          Physical Exam   Constitutional: She is oriented to person, place, and time.   Temporal and distal extremity muscle wasting   HENT:   Head: Normocephalic and atraumatic.   Eyes: EOM are normal. Pupils are equal, round, and reactive to light. Scleral icterus is present.   Neck: Normal range of motion.   Cardiovascular: Normal rate and regular rhythm.   No murmur heard.  Pulmonary/Chest: Effort normal and breath sounds normal. No respiratory distress. She has no wheezes.   Abdominal: Soft. Bowel sounds are normal. She exhibits distension. There is tenderness.   Chevron incision w/ staples, CDI   Musculoskeletal: Normal range of motion. She exhibits edema (3+ BLE).   Neurological: She is alert and oriented to person, place, and time.   Skin: Skin is warm and dry.   Nursing note and vitals  reviewed.      Laboratory:  Immunosuppressants         Stop Route Frequency     tacrolimus capsule 0.5 mg      -- Oral 2 times daily     mycophenolate capsule 1,000 mg      -- Oral 2 times daily        CBC:   Recent Labs   Lab 10/27/18  0518 10/27/18  0748   WBC 4.27  --    RBC 2.25*  --    HGB 6.7* 6.6*   HCT 20.4* 20.2*   PLT 75*  --    MCV 91  --    MCH 29.8  --    MCHC 32.8  --      CMP:   Recent Labs   Lab 10/27/18  0518   GLU 97   CALCIUM 9.0   ALBUMIN 2.6*   PROT 4.0*   *   K 3.2*   CO2 26   CL 95   *   CREATININE 2.2*   ALKPHOS 102   ALT 18   AST 21   BILITOT 3.1*     Labs within the past 24 hours have been reviewed.    Diagnostic Results:  I have personally reviewed all pertinent imaging studies.

## 2018-10-27 NOTE — ASSESSMENT & PLAN NOTE
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   - POD 1 US: increased arterial resistive indices, suggestive of edema vs cute rejection vs congestion.  - Repeat US 10/9 with continued elevation of RIs.  - .LFTs stable.  - T bili trending down.  - repeat liver US (10/11): elevated RIs.

## 2018-10-27 NOTE — ASSESSMENT & PLAN NOTE
Avoid insulin stacking and hypoglycemia.  Lab Results   Component Value Date    CREATININE 2.2 (H) 10/27/2018

## 2018-10-27 NOTE — ASSESSMENT & PLAN NOTE
- Urine Cx 10/15 w/ Klebsiella pneumonia.  - Started Ciprofloxacin 10/17. Dc'd 10/19. Coverage w/ triple therapy for h. Pylori.  - Pt asymptomatic at this time.

## 2018-10-27 NOTE — ASSESSMENT & PLAN NOTE
BG goal 140 - 180       Continue Novolog 14 units with meals.   Low dose correction scale insulin  BG monitoring AC/HS      Discharge planning:  TBD

## 2018-10-27 NOTE — ASSESSMENT & PLAN NOTE
- Na 119 on admit.  - Na was improving post transplant w HD.  - Na stable at this time. Monitoring w/ daily labs.

## 2018-10-27 NOTE — ASSESSMENT & PLAN NOTE
- Creatinine on arrival 0.8 and trended up.  - BUN/cr (10/11/18): 91/1.9.   - Tacrolimus 26.9 (10/08).  - Nephrology consulted.  - Renal US (10/11): no hydronephrosis.  - Echo 10/12: diastolic dysfunction, likely 2/2 to ARF.  - pt on renal diet w 1500 ml FR.  - CRRT 10/14; SLED 10/17, 10/20. --> no improvements noted in kidney fxn. Per nephrology, does not think SLED provides any benefit.  - Continue with lasix as patient needs diuresis. Lasix 40 mg IV x 2 doses today.

## 2018-10-27 NOTE — ASSESSMENT & PLAN NOTE
- Valcyte for CMV prophylaxis.  - Bactrim for PCP prophylaxis (MWF).  - Fluconazole for fungal prophylaxis.

## 2018-10-28 LAB
ABO + RH BLD: NORMAL
ALBUMIN SERPL BCP-MCNC: 2.6 G/DL
ALP SERPL-CCNC: 111 U/L
ALT SERPL W/O P-5'-P-CCNC: 18 U/L
ANION GAP SERPL CALC-SCNC: 11 MMOL/L
AST SERPL-CCNC: 23 U/L
BASOPHILS # BLD AUTO: 0 K/UL
BASOPHILS # BLD AUTO: 0.01 K/UL
BASOPHILS NFR BLD: 0 %
BASOPHILS NFR BLD: 0.3 %
BILIRUB SERPL-MCNC: 3.7 MG/DL
BLD GP AB SCN CELLS X3 SERPL QL: NORMAL
BUN SERPL-MCNC: 113 MG/DL
CALCIUM SERPL-MCNC: 8.7 MG/DL
CHLORIDE SERPL-SCNC: 94 MMOL/L
CO2 SERPL-SCNC: 27 MMOL/L
CREAT SERPL-MCNC: 2.2 MG/DL
DIFFERENTIAL METHOD: ABNORMAL
DIFFERENTIAL METHOD: ABNORMAL
EOSINOPHIL # BLD AUTO: 0 K/UL
EOSINOPHIL # BLD AUTO: 0.1 K/UL
EOSINOPHIL NFR BLD: 0.2 %
EOSINOPHIL NFR BLD: 1.8 %
ERYTHROCYTE [DISTWIDTH] IN BLOOD BY AUTOMATED COUNT: 18.8 %
ERYTHROCYTE [DISTWIDTH] IN BLOOD BY AUTOMATED COUNT: 19 %
EST. GFR  (AFRICAN AMERICAN): 25.6 ML/MIN/1.73 M^2
EST. GFR  (NON AFRICAN AMERICAN): 22.2 ML/MIN/1.73 M^2
GLUCOSE SERPL-MCNC: 130 MG/DL
HCT VFR BLD AUTO: 24 %
HCT VFR BLD AUTO: 25.3 %
HGB BLD-MCNC: 7.8 G/DL
HGB BLD-MCNC: 8.2 G/DL
IMM GRANULOCYTES # BLD AUTO: 0.04 K/UL
IMM GRANULOCYTES # BLD AUTO: 0.04 K/UL
IMM GRANULOCYTES NFR BLD AUTO: 0.9 %
IMM GRANULOCYTES NFR BLD AUTO: 1 %
INR PPP: 1
LYMPHOCYTES # BLD AUTO: 0.2 K/UL
LYMPHOCYTES # BLD AUTO: 0.2 K/UL
LYMPHOCYTES NFR BLD: 3.7 %
LYMPHOCYTES NFR BLD: 5.5 %
MAGNESIUM SERPL-MCNC: 1.6 MG/DL
MCH RBC QN AUTO: 29.3 PG
MCH RBC QN AUTO: 29.4 PG
MCHC RBC AUTO-ENTMCNC: 32.4 G/DL
MCHC RBC AUTO-ENTMCNC: 32.5 G/DL
MCV RBC AUTO: 90 FL
MCV RBC AUTO: 91 FL
MONOCYTES # BLD AUTO: 0.2 K/UL
MONOCYTES # BLD AUTO: 0.2 K/UL
MONOCYTES NFR BLD: 3.9 %
MONOCYTES NFR BLD: 6.3 %
NEUTROPHILS # BLD AUTO: 3.3 K/UL
NEUTROPHILS # BLD AUTO: 4 K/UL
NEUTROPHILS NFR BLD: 85.1 %
NEUTROPHILS NFR BLD: 91.3 %
NRBC BLD-RTO: 0 /100 WBC
NRBC BLD-RTO: 0 /100 WBC
PHOSPHATE SERPL-MCNC: 4 MG/DL
PLATELET # BLD AUTO: 102 K/UL
PLATELET # BLD AUTO: 77 K/UL
PMV BLD AUTO: 11.7 FL
PMV BLD AUTO: 11.9 FL
POCT GLUCOSE: 182 MG/DL (ref 70–110)
POCT GLUCOSE: 194 MG/DL (ref 70–110)
POCT GLUCOSE: 210 MG/DL (ref 70–110)
POCT GLUCOSE: 267 MG/DL (ref 70–110)
POTASSIUM SERPL-SCNC: 3.1 MMOL/L
PROT SERPL-MCNC: 4.3 G/DL
PROTHROMBIN TIME: 10.8 SEC
RBC # BLD AUTO: 2.65 M/UL
RBC # BLD AUTO: 2.8 M/UL
SODIUM SERPL-SCNC: 132 MMOL/L
TACROLIMUS BLD-MCNC: 4.5 NG/ML
WBC # BLD AUTO: 3.84 K/UL
WBC # BLD AUTO: 4.35 K/UL

## 2018-10-28 PROCEDURE — 63600175 PHARM REV CODE 636 W HCPCS: Performed by: PHYSICIAN ASSISTANT

## 2018-10-28 PROCEDURE — 25000003 PHARM REV CODE 250: Performed by: NURSE PRACTITIONER

## 2018-10-28 PROCEDURE — 63600175 PHARM REV CODE 636 W HCPCS: Performed by: STUDENT IN AN ORGANIZED HEALTH CARE EDUCATION/TRAINING PROGRAM

## 2018-10-28 PROCEDURE — 85025 COMPLETE CBC W/AUTO DIFF WBC: CPT | Mod: 91

## 2018-10-28 PROCEDURE — 86901 BLOOD TYPING SEROLOGIC RH(D): CPT

## 2018-10-28 PROCEDURE — 83735 ASSAY OF MAGNESIUM: CPT

## 2018-10-28 PROCEDURE — C9113 INJ PANTOPRAZOLE SODIUM, VIA: HCPCS | Performed by: PHYSICIAN ASSISTANT

## 2018-10-28 PROCEDURE — 94761 N-INVAS EAR/PLS OXIMETRY MLT: CPT

## 2018-10-28 PROCEDURE — 94664 DEMO&/EVAL PT USE INHALER: CPT

## 2018-10-28 PROCEDURE — 20600001 HC STEP DOWN PRIVATE ROOM

## 2018-10-28 PROCEDURE — 25000003 PHARM REV CODE 250: Performed by: TRANSPLANT SURGERY

## 2018-10-28 PROCEDURE — 99900035 HC TECH TIME PER 15 MIN (STAT)

## 2018-10-28 PROCEDURE — 84100 ASSAY OF PHOSPHORUS: CPT

## 2018-10-28 PROCEDURE — 99231 SBSQ HOSP IP/OBS SF/LOW 25: CPT | Mod: ,,, | Performed by: NURSE PRACTITIONER

## 2018-10-28 PROCEDURE — 63600175 PHARM REV CODE 636 W HCPCS: Performed by: NURSE PRACTITIONER

## 2018-10-28 PROCEDURE — 25000003 PHARM REV CODE 250: Performed by: PHYSICIAN ASSISTANT

## 2018-10-28 PROCEDURE — 80053 COMPREHEN METABOLIC PANEL: CPT

## 2018-10-28 PROCEDURE — 80197 ASSAY OF TACROLIMUS: CPT

## 2018-10-28 PROCEDURE — 25000003 PHARM REV CODE 250: Performed by: STUDENT IN AN ORGANIZED HEALTH CARE EDUCATION/TRAINING PROGRAM

## 2018-10-28 PROCEDURE — 85610 PROTHROMBIN TIME: CPT

## 2018-10-28 PROCEDURE — 99233 SBSQ HOSP IP/OBS HIGH 50: CPT | Mod: 24,,, | Performed by: NURSE PRACTITIONER

## 2018-10-28 PROCEDURE — 86920 COMPATIBILITY TEST SPIN: CPT

## 2018-10-28 RX ORDER — POTASSIUM CHLORIDE 20 MEQ/1
40 TABLET, EXTENDED RELEASE ORAL EVERY 6 HOURS
Status: DISCONTINUED | OUTPATIENT
Start: 2018-10-28 | End: 2018-10-28

## 2018-10-28 RX ORDER — FUROSEMIDE 10 MG/ML
40 INJECTION INTRAMUSCULAR; INTRAVENOUS 2 TIMES DAILY
Status: COMPLETED | OUTPATIENT
Start: 2018-10-28 | End: 2018-10-28

## 2018-10-28 RX ADMIN — MYCOPHENOLATE MOFETIL 1000 MG: 250 CAPSULE ORAL at 08:10

## 2018-10-28 RX ADMIN — MIDODRINE HYDROCHLORIDE 15 MG: 5 TABLET ORAL at 08:10

## 2018-10-28 RX ADMIN — PANTOPRAZOLE SODIUM 40 MG: 40 INJECTION, POWDER, FOR SOLUTION INTRAVENOUS at 08:10

## 2018-10-28 RX ADMIN — INSULIN ASPART 14 UNITS: 100 INJECTION, SOLUTION INTRAVENOUS; SUBCUTANEOUS at 08:10

## 2018-10-28 RX ADMIN — AMOXICILLIN 500 MG: 500 CAPSULE ORAL at 08:10

## 2018-10-28 RX ADMIN — DOCUSATE SODIUM 100 MG: 100 CAPSULE, LIQUID FILLED ORAL at 08:10

## 2018-10-28 RX ADMIN — URSODIOL 300 MG: 300 CAPSULE ORAL at 08:10

## 2018-10-28 RX ADMIN — TRAZODONE HYDROCHLORIDE 25 MG: 50 TABLET ORAL at 08:10

## 2018-10-28 RX ADMIN — OXYCODONE HYDROCHLORIDE 5 MG: 5 TABLET ORAL at 08:10

## 2018-10-28 RX ADMIN — MAGNESIUM OXIDE TAB 400 MG (241.3 MG ELEMENTAL MG) 800 MG: 400 (241.3 MG) TAB at 08:10

## 2018-10-28 RX ADMIN — TACROLIMUS 0.5 MG: 0.5 CAPSULE ORAL at 05:10

## 2018-10-28 RX ADMIN — MIDODRINE HYDROCHLORIDE 15 MG: 5 TABLET ORAL at 02:10

## 2018-10-28 RX ADMIN — TACROLIMUS 0.5 MG: 0.5 CAPSULE ORAL at 08:10

## 2018-10-28 RX ADMIN — FLUCONAZOLE 200 MG: 200 TABLET ORAL at 08:10

## 2018-10-28 RX ADMIN — LEVOFLOXACIN 250 MG: 250 TABLET, FILM COATED ORAL at 08:10

## 2018-10-28 RX ADMIN — PREDNISONE 10 MG: 10 TABLET ORAL at 08:10

## 2018-10-28 RX ADMIN — INSULIN ASPART 3 UNITS: 100 INJECTION, SOLUTION INTRAVENOUS; SUBCUTANEOUS at 01:10

## 2018-10-28 RX ADMIN — FUROSEMIDE 40 MG: 10 INJECTION, SOLUTION INTRAMUSCULAR; INTRAVENOUS at 08:10

## 2018-10-28 RX ADMIN — INSULIN ASPART 2 UNITS: 100 INJECTION, SOLUTION INTRAVENOUS; SUBCUTANEOUS at 06:10

## 2018-10-28 RX ADMIN — POLYETHYLENE GLYCOL 3350 17 G: 17 POWDER, FOR SOLUTION ORAL at 08:10

## 2018-10-28 RX ADMIN — MIRTAZAPINE 7.5 MG: 7.5 TABLET ORAL at 08:10

## 2018-10-28 RX ADMIN — INSULIN ASPART 14 UNITS: 100 INJECTION, SOLUTION INTRAVENOUS; SUBCUTANEOUS at 06:10

## 2018-10-28 RX ADMIN — FUROSEMIDE 40 MG: 10 INJECTION, SOLUTION INTRAMUSCULAR; INTRAVENOUS at 11:10

## 2018-10-28 RX ADMIN — POTASSIUM BICARBONATE 25 MEQ: 25 TABLET, EFFERVESCENT ORAL at 02:10

## 2018-10-28 RX ADMIN — INSULIN ASPART 14 UNITS: 100 INJECTION, SOLUTION INTRAVENOUS; SUBCUTANEOUS at 01:10

## 2018-10-28 NOTE — ASSESSMENT & PLAN NOTE
- Diuresed w/ Lasix 100mg x 1 (10/12).  - CRRT 10/14.  .    - SLED 10/17, 10/20.  - Diuresed w/ Lasix 80 BID-- stopped (10/23).  - Will assess the need for diuresis on a daily basis.  - Lasix 40mg IV x 2, 10/27 & 10/28.

## 2018-10-28 NOTE — ASSESSMENT & PLAN NOTE
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   - POD 1 US: increased arterial resistive indices, suggestive of edema vs cute rejection vs congestion.  - Repeat US 10/9 with continued elevation of RIs.  - LFTs stable.  - T bili trending down.  - repeat liver US (10/11): elevated RIs.

## 2018-10-28 NOTE — PROGRESS NOTES
[facility]  Liver Transplant  Progress Note    Patient Name: Scarlett Reddy  MRN: [id number]  Admission Date: 10/1/2018  Hospital Length of Stay: 26 days  Code Status: Full Code  Primary Care Provider: Primary Doctor No  Post-Operative Day: 23    ORGAN:   LIVER  Disease Etiology: Cirrhosis: Type B and D  Donor Type:    - Brain Death  CDC High Risk:   No  Donor CMV Status:   Donor CMV Status: Positive  Donor HBcAB:   Negative  Donor HCV Status:   Negative  Donor HBV SETH: Negative  Donor HCV SETH: Negative  Whole or Partial: Whole Liver  Biliary Anastomosis: End to End  Arterial Anatomy: Replaced Left Hepatic and Right Hepatic  Subjective:     History of Present Illness:  Ms. Reddy is a 69 y/o female with PMH of ESLD secondary Hep B and D.  Listed for liver transplant with MELD 23.  Paracentesis 10/1 with 5L removed, no fluid sent for analysis.  Morning labs significant for hyponatremia, Na 119.  Pt admitted to LTS for sodium monitoring.        Hospital Course:  Hospital Course: 69 y/o F h/o HBV/delta cirrhosis s/p DBDLT 10/5/18 (CMV D+/R+, steroid induction, MMF/tacro/pred maintenance) with take back on 10/6 2/2 hyperbilirubinema and bilious VALORIE drainage, no leak identified. Post-op course c/b hypercapneic and hypoxic respiratory failure and was reintubated though quickly extubated now doing well. Liver bx (10/6) sig for Zone 3 hemorrhage and collapse, in addition to cholestasis. Transferred from ICU on POD #4. ID consulted to comment on positive diphtheroid culture from ascitic fluid (likely skin colonization) as well as ESBL Klebsiella pneumoniae in urine culture (10/4).  Pt asymptomatic and cell count from ascitic fluid unremarkable. Per ID recs, no need to treat diphtheroids or asymptomatic ESBL Kleb pneumo bacteriuria though pt has had 6 days of therapy which would cover these organisms. Mild peritransplant ascites- repeat US 10/6 with increased arterial resistive indices. Repeat US 10/9  w/ continued elevation in RIs and fluid collections. LFTs trending down nicely.     Acute gradual worsening of renal function s/p OLT.Creatinine on arrival 0.8 and has been up trending. Nephrology consulted for post-op EVA. Remains on renal/low K diet. Patient continued to be diuresed with lasix. BUN and Cr remain elevated requiring multiple rounds of dialysis (10/15, 10/17, 10/20). No significant improvements in kidney function noted w/ SLED. Pt remains anemic requiring multiple blood transfusions (10/14, 10/16, 10/18, 10/19, 10/22).  Pt reported dark stools 10/15 but color has normalized. FOBT+. EGD 10/18 consistent with ulcerated mucosa in duodenum s/p coagulation. Biopsy pending. Concern for H. Pylori. Started Amoxicillin/levofloxacin (10/19). Remains on Protonix 40 mg BID. Will cont close monitoring H/H, transfuse as needed with goal Hb > 7.0. Decreased platelets, improvements w/ 2 u plts (10/18, 10/19) and DDAVP.   Of note, Urine cx + Klebsiella pneumoniae (10/13). Received 3 day course of ciprofloxacin, dc'd 10/19.   On 10/19 pm pt c/o crushing chest pain. Troponin 0.094, likely 2/2 ischemic stress. EKG NSR.     Interval history: pt not sleeping well. Otherwise feeling ok. Trazodone ordered. H&H responded appropriately to blood transfusion yesterday. Pt denies melena. Cr 2.2 today. Continue lasix 40 mg IV bid x 2 doses. RLQ near drain sites with mild redness likely r/t to edema in tissues. Pt on amoxil for H pylori. Monitor.     Scheduled Meds:   amoxicillin  500 mg Oral Q12H    docusate sodium  100 mg Oral Daily    entecavir  0.5 mg Oral Q72H    fluconazole  200 mg Oral Daily    furosemide  40 mg Intravenous BID    insulin aspart U-100  14 Units Subcutaneous TIDWM    levoFLOXacin  250 mg Oral Daily    magnesium oxide  800 mg Oral BID    midodrine  15 mg Oral TID    mirtazapine  7.5 mg Oral QHS    mycophenolate  1,000 mg Oral BID    pantoprazole  40 mg Intravenous BID    polyethylene glycol  17 g  Oral Daily    potassium chloride  40 mEq Oral Q6H    predniSONE  10 mg Oral Daily    Followed by    [START ON 10/31/2018] predniSONE  5 mg Oral Daily    Followed by    [START ON 11/7/2018] predniSONE  2.5 mg Oral Daily    sulfamethoxazole-trimethoprim 400-80mg  1 tablet Oral Every Mon, Wed, Fri    tacrolimus  0.5 mg Oral BID    traZODone  25 mg Oral QHS    ursodiol  300 mg Oral BID    valGANciclovir  450 mg Oral Twice Weekly     Continuous Infusions:  PRN Meds:sodium chloride, bisacodyl, bisacodyl, calcium carbonate, dextrose 50%, dextrose 50%, glucagon (human recombinant), glucose, glucose, insulin aspart U-100, ondansetron, ondansetron, oxyCODONE, oxyCODONE, polyethylene glycol, promethazine (PHENERGAN) IVPB, sodium chloride 0.9%    Review of Systems   Constitutional: Positive for activity change, appetite change (decreased) and fatigue. Negative for chills and fever.   HENT: Negative for congestion and facial swelling.    Eyes: Negative for pain, discharge and visual disturbance.   Respiratory: Negative for cough, chest tightness, shortness of breath and wheezing.    Cardiovascular: Positive for leg swelling. Negative for chest pain and palpitations.   Gastrointestinal: Positive for abdominal distention and abdominal pain. Negative for constipation, diarrhea and vomiting.   Endocrine: Negative.    Genitourinary: Negative for decreased urine volume, difficulty urinating and dysuria.   Musculoskeletal: Negative for arthralgias and back pain.   Skin: Positive for wound.   Allergic/Immunologic: Positive for immunocompromised state.   Neurological: Positive for weakness. Negative for tremors and headaches.   Psychiatric/Behavioral: Negative for agitation and confusion.     Objective:     Vital Signs (Most Recent):  Temp: 98.1 °F (36.7 °C) (10/28/18 0800)  Pulse: 107 (10/28/18 0800)  Resp: 14 (10/28/18 0800)  BP: 135/85 (10/28/18 0800)  SpO2: 98 % (10/28/18 0800) Vital Signs (24h Range):  Temp:  [97 °F (36.1  °C)-98.1 °F (36.7 °C)] 98.1 °F (36.7 °C)  Pulse:  [] 107  Resp:  [10-18] 14  SpO2:  [96 %-100 %] 98 %  BP: (108-135)/(44-88) 135/85     Weight: 73.4 kg (161 lb 13.1 oz)  Body mass index is 28.66 kg/m².    Intake/Output - Last 3 Shifts       10/26 0700 - 10/27 0659 10/27 0700 - 10/28 0659 10/28 0700 - 10/29 0659    P.O. 915 920     Blood  250     Total Intake(mL/kg) 915 (12.5) 1170 (15.9)     Urine (mL/kg/hr) 950 (0.5) 1200 (0.7)     Emesis/NG output 0 0     Other 0 0     Stool 0 0     Blood 0 0     Total Output 950 1200     Net -35 -30            Urine Occurrence 0 x 4 x     Stool Occurrence 0 x 1 x     Emesis Occurrence 0 x 0 x           Physical Exam   Constitutional: She is oriented to person, place, and time.   Temporal and distal extremity muscle wasting   HENT:   Head: Normocephalic and atraumatic.   Eyes: EOM are normal. Pupils are equal, round, and reactive to light. Scleral icterus is present.   Neck: Normal range of motion.   Cardiovascular: Normal rate and regular rhythm.   No murmur heard.  Pulmonary/Chest: Effort normal and breath sounds normal. No respiratory distress. She has no wheezes.   Abdominal: Soft. Bowel sounds are normal. She exhibits distension. There is tenderness.   Chevron incision w/ staples, CDI   Musculoskeletal: Normal range of motion. She exhibits edema (3+ BLE).   Neurological: She is alert and oriented to person, place, and time.   Skin: Skin is warm and dry.   Nursing note and vitals reviewed.      Laboratory:  Immunosuppressants         Stop Route Frequency     tacrolimus capsule 0.5 mg      -- Oral 2 times daily     mycophenolate capsule 1,000 mg      -- Oral 2 times daily        CBC:   Recent Labs   Lab 10/28/18  0541   WBC 3.84*   RBC 2.65*   HGB 7.8*   HCT 24.0*   PLT 77*   MCV 91   MCH 29.4   MCHC 32.5     CMP:   Recent Labs   Lab 10/28/18  0541   *   CALCIUM 8.7   ALBUMIN 2.6*   PROT 4.3*   *   K 3.1*   CO2 27   CL 94*   *   CREATININE 2.2*    ALKPHOS 111   ALT 18   AST 23   BILITOT 3.7*     Labs within the past 24 hours have been reviewed.    Diagnostic Results:  I have personally reviewed all pertinent imaging studies.    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   - POD 1 US: increased arterial resistive indices, suggestive of edema vs cute rejection vs congestion.  - Repeat US 10/9 with continued elevation of RIs.  - LFTs stable.  - T bili trending down.  - repeat liver US (10/11): elevated RIs.           Hyponatremia    - Na 119 on admit.  - Na was improving post transplant w HD.  - Na stable at this time.             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  -Tx w/ Entecavir- dose changed to q72 hours given renal function.         Ascites    - paracentesis 10/1, 5L removed, no fluid sent for analysis.  - lasix 40 mg IV x 2 doses.          Severe malnutrition    - dietary consulted.   - temporal and distal extremity muscle wasting and edema.  - encourage supplements and to increase nutritional intake .  - pt on renal/low K+ diet.     Hypokalemia    - Kdur 40 meq x 2 doses.       Type 2 diabetes mellitus with diabetic polyneuropathy, with long-term current use of insulin    - continue home regimen.  - endocrine consulted.  - Pt off insulin drip at this time.  Apprec endo recs.        Duodenal ulcer    - EGD 10/18- sig for ulcerated duodenal mucosa.   - Biopsy pending from 10/18.  - Cont protonix 40 BID.  - Started H. Pylori tx 10/19: Amoxicillin/Levo. Treat x 14 days, end 11/2.       UTI (urinary tract infection)    - Urine Cx 10/15 w/ Klebsiella pneumonia.  - Started Ciprofloxacin 10/17. Dc'd 10/19. Coverage w/ triple therapy for h. Pylori.  - Pt asymptomatic at this time.       Acute blood loss anemia    - H&H cont to fluctuate.   -  FOBT +  - EGD 10/18- showed diffuse bleeding with ulcerated duodenal mucosa   - Monitoring w/ CBC q 12 to assess the need for transfusion.  - 1 unit PRBCs transfused 10/27. H&H stable today.            Post LT - Acute renal failure    - Creatinine on arrival 0.8 and trended up.  - BUN/cr (10/11/18): 91/1.9.   - Tacrolimus 26.9 (10/08).  - Nephrology consulted.  - Renal US (10/11): no hydronephrosis.  - Echo 10/12: diastolic dysfunction, likely 2/2 to ARF.  - pt on renal diet w 1500 ml FR.  - CRRT 10/14; SLED 10/17, 10/20. --> no improvements noted in kidney fxn. Per nephrology, does not think SLED provides any benefit.  - Continue with lasix as patient needs diuresis. Lasix 40 mg IV x 2 again today.      Hypervolemia associated with renal insufficiency    - Diuresed w/ Lasix 100mg x 1 (10/12).  - CRRT 10/14.  .    - SLED 10/17, 10/20.  - Diuresed w/ Lasix 80 BID-- stopped (10/23).  - Will assess the need for diuresis on a daily basis.  - Lasix 40mg IV x 2, 10/27 & 10/28.       EVA (acute kidney injury)           Metabolic acidosis    - CRRT 10/14.  - SLED 10/17, 10/20.         At risk for opportunistic infections    - Valcyte for CMV prophylaxis.  - Bactrim for PCP prophylaxis (MW).  - Fluconazole for fungal prophylaxis.        Long-term use of immunosuppressant medication    -  Cellcept/Prograf/pred.  -  Held prograf 10/8 for elevated levels.  -  Resumed Prograf 10/10/19. Will adjust as needed.   -  Will monitor for signs of toxic side effects, check daily tacrolimus troughs, and change meds accordingly.       Prophylactic immunotherapy    - See long term use immunosuppresion.       Weakness    - see physical decond.       Physical deconditioning    - PT/OT consulted.  - Recommend home health PT and rolling walker at discharge.       Hypotension    - continue Midodrine 15 mg TID. Monitor.       Anemia requiring transfusions    - Fluctuating H/H.    - Transfuse 10/14 and 10/16 (1 unit); 10/18 (2 units);  10/19 (1 unit), 10/22 (1 unit).  - Decreased platelets -- 1 u plt given 10/18.  - DDAVP given 10/18 and 10/19.  - FOBT +.  - LDH elevated, Hapto < 10.  - EGD 10/18: ulcerated duodenal mucosa.   - Monitoring w/ CBC q12hr to assess the need for transfusion.  - H&H 6.6/20.2 on 10/27. Transfused 1 unit PRBCs. Repeat H&H following transfusion improved.  - H&H stable today.            VTE Risk Mitigation (From admission, onward)        Ordered     Place sequential compression device  Until discontinued      10/05/18 0709          The patients clinical status was discussed at multidisplinary rounds, involving transplant surgery, transplant medicine, pharmacy, nursing, nutrition, and social work    Discharge Planning: not a candidate for dc at this time.       Kimber Valle NP  Liver Transplant  [facility]

## 2018-10-28 NOTE — PLAN OF CARE
Problem: Patient Care Overview  Goal: Plan of Care Review  Outcome: Ongoing (interventions implemented as appropriate)  AAOx4, VSS, SpO- >96% on RA, tele- HR normal sinus rhythm- sinus tach on exertion, AC/HS- night time sugar= 215, 1 U insulin given, isolation precautions continued for + ESBL in urine  Afebrile overnight- WBC 5.24, amoxicillin 500 PO q12h continued  1 U PRBC given during day for drop in H/H, Repeat H/H= 8.1/24.7  40mg IVP lasix given BID, refer to chart for UOP, Cr yesterday AM 2.2. Pasquale hose in use, heparin d/c'd, 1 BM overnight, PRN suppository + dulcolax PO given  Up with 2x assist + walker in use.   Bed in lowest position, non skid socks worn, call light within reach. Will continue to monitor.

## 2018-10-28 NOTE — ASSESSMENT & PLAN NOTE
- EGD 10/18- sig for ulcerated duodenal mucosa.   - Biopsy pending from 10/18.  - Cont protonix 40 BID.  - Started H. Pylori tx 10/19: Amoxicillin/Levo. Treat x 14 days, end 11/2.

## 2018-10-28 NOTE — PROGRESS NOTES
"[facility]  Endocrinology  Progress Note    Admit Date: 10/1/2018     Reason for Consult: Management of type 2 DM, Hyperglycemia      Surgical Procedure and Date: Liver transplant 10/5/18, Ex lap 10/6/18     Diabetes diagnosis year:      Home Diabetes Medications:  Lantus 18 units HS and novolog 17 units with meals plus correction scale (150-200 +1)        Lab Results   Component Value Date     HGBA1C 6.8 (H) 2018        How often checking glucose at home? 3-4   BG readings on regimen: 120-150.  Post prandial readings as high as upper 200s  Hypoglycemia on the regimen? yes, none recently   Missed doses on regimen?  No     Diabetes Complications include:     Diabetic peripheral neuropathy      Complicating diabetes co morbidities:   CIRRHOSIS        HPI:  Patient is a 68 y.o. female with a diagnosis of ESLD, listed for liver transplant with meld 23 who underwent live transplant on 10/5/18.  Back to OR on 10/6/18 for ex lap.  Admitted 10/1/18 for hyponatremia s/p paracentesis.  Personal has known diagnosis of diabetes, endocrine consulted for diabetes management.    Post-operative course complicated by ESBL Klebsiella pneumoniae in urine (in contact isolation), peritransplant ascites, worsening renal function (required temporary dialysis), anemia (multiple blood transfusions), and possible GI bleed.     Interval HPI:   Overnight events: remains in TSU. BG at or slightly above goal overnight with prandial coverage and required correction scale coverage. Prednisone 10 mg daily; standard steroid taper.   Eatin%  Nausea: No  Hypoglycemia and intervention: No  Fever: No  TPN and/or TF: No    /66   Pulse 89   Temp 98.1 °F (36.7 °C)   Resp 11   Ht 5' 3" (1.6 m)   Wt 73.4 kg (161 lb 13.1 oz)   LMP  (LMP Unknown)   SpO2 96%   Breastfeeding? No   BMI 28.66 kg/m²      Labs Reviewed and Include    Recent Labs   Lab 10/28/18  0541   *   CALCIUM 8.7   ALBUMIN 2.6*   PROT " 4.3*   *   K 3.1*   CO2 27   CL 94*   *   CREATININE 2.2*   ALKPHOS 111   ALT 18   AST 23   BILITOT 3.7*     Lab Results   Component Value Date    WBC 3.84 (L) 10/28/2018    HGB 7.8 (L) 10/28/2018    HCT 24.0 (L) 10/28/2018    MCV 91 10/28/2018    PLT 77 (L) 10/28/2018     No results for input(s): TSH, FREET4 in the last 168 hours.  Lab Results   Component Value Date    HGBA1C 6.0 (H) 10/04/2018       Nutritional status:   Body mass index is 28.66 kg/m².  Lab Results   Component Value Date    ALBUMIN 2.6 (L) 10/28/2018    ALBUMIN 2.6 (L) 10/27/2018    ALBUMIN 3.0 (L) 10/26/2018     Lab Results   Component Value Date    PREALBUMIN 12 (L) 09/17/2018       Estimated Creatinine Clearance: 23.2 mL/min (A) (based on SCr of 2.2 mg/dL (H)).    Accu-Checks  Recent Labs     10/25/18  1435 10/25/18  1908 10/26/18  0748 10/26/18  1201 10/26/18  1809 10/26/18  2219 10/27/18  0905 10/27/18  1320 10/27/18  2000 10/27/18  2322   POCTGLUCOSE 235* 199* 143* 245* 208* 211* 119* 178* 164* 215*       Current Medications and/or Treatments Impacting Glycemic Control  Immunotherapy:    Immunosuppressants         Stop Route Frequency     tacrolimus capsule 0.5 mg      -- Oral 2 times daily     mycophenolate capsule 1,000 mg      -- Oral 2 times daily        Steroids:   Hormones (From admission, onward)    Start     Stop Route Frequency Ordered    11/07/18 0900  predniSONE tablet 2.5 mg      11/14 0859 Oral Daily 10/17/18 0943    10/31/18 0900  predniSONE tablet 5 mg      11/07 0859 Oral Daily 10/17/18 0943    10/24/18 0900  predniSONE tablet 10 mg      10/31 0859 Oral Daily 10/17/18 0943    10/17/18 0945  predniSONE tablet 15 mg      10/24 0859 Oral Daily 10/17/18 0943        Pressors:    Autonomic Drugs (From admission, onward)    Start     Stop Route Frequency Ordered    10/10/18 1500  midodrine tablet 15 mg      -- Oral 3 times daily 10/10/18 1122    10/06/18 1617  rocuronium (ZEMURON) 10 mg/mL injection     Comments:   Created by cabinet override    10/07 0429   10/06/18 1617        Hyperglycemia/Diabetes Medications:   Antihyperglycemics (From admission, onward)    Start     Stop Route Frequency Ordered    10/26/18 0715  insulin aspart U-100 pen 14 Units      -- SubQ 3 times daily with meals 10/25/18 1716    10/14/18 0925  insulin aspart U-100 pen 0-5 Units      -- SubQ Before meals & nightly PRN 10/14/18 0825    10/07/18 1200  insulin regular (Humulin R) 100 Units in sodium chloride 0.9% 100 mL infusion      10/13 2100 IV Continuous 10/07/18 1055          ASSESSMENT and PLAN    * Liver transplanted    Managed per primary team  Avoid hypoglycemia    Recent Labs   Lab 10/27/18  0518   ALT 18   AST 21   ALKPHOS 102   BILITOT 3.1*   PROT 4.0*   ALBUMIN 2.6*            Type 2 diabetes mellitus with diabetic polyneuropathy, with long-term current use of insulin    BG goal 140 - 180       Continue Novolog 14 units with meals.   Low dose correction scale insulin  BG monitoring AC/HS      Discharge planning:  TBD     Corticosteroids adverse reaction    On standard steroid taper per transplant team; may elevate BG readings         EVA (acute kidney injury)    Avoid insulin stacking and hypoglycemia.  Lab Results   Component Value Date    CREATININE 2.2 (H) 10/27/2018            Prophylactic immunotherapy    May increase insulin resistance.        Severe malnutrition    Oral intake encouraged, may affect BG readings         Aviva Caldera NP  Endocrinology  [facility]

## 2018-10-28 NOTE — SUBJECTIVE & OBJECTIVE
Scheduled Meds:   amoxicillin  500 mg Oral Q12H    docusate sodium  100 mg Oral Daily    entecavir  0.5 mg Oral Q72H    fluconazole  200 mg Oral Daily    furosemide  40 mg Intravenous BID    insulin aspart U-100  14 Units Subcutaneous TIDWM    levoFLOXacin  250 mg Oral Daily    magnesium oxide  800 mg Oral BID    midodrine  15 mg Oral TID    mirtazapine  7.5 mg Oral QHS    mycophenolate  1,000 mg Oral BID    pantoprazole  40 mg Intravenous BID    polyethylene glycol  17 g Oral Daily    potassium chloride  40 mEq Oral Q6H    predniSONE  10 mg Oral Daily    Followed by    [START ON 10/31/2018] predniSONE  5 mg Oral Daily    Followed by    [START ON 11/7/2018] predniSONE  2.5 mg Oral Daily    sulfamethoxazole-trimethoprim 400-80mg  1 tablet Oral Every Mon, Wed, Fri    tacrolimus  0.5 mg Oral BID    traZODone  25 mg Oral QHS    ursodiol  300 mg Oral BID    valGANciclovir  450 mg Oral Twice Weekly     Continuous Infusions:  PRN Meds:sodium chloride, bisacodyl, bisacodyl, calcium carbonate, dextrose 50%, dextrose 50%, glucagon (human recombinant), glucose, glucose, insulin aspart U-100, ondansetron, ondansetron, oxyCODONE, oxyCODONE, polyethylene glycol, promethazine (PHENERGAN) IVPB, sodium chloride 0.9%    Review of Systems   Constitutional: Positive for activity change, appetite change (decreased) and fatigue. Negative for chills and fever.   HENT: Negative for congestion and facial swelling.    Eyes: Negative for pain, discharge and visual disturbance.   Respiratory: Negative for cough, chest tightness, shortness of breath and wheezing.    Cardiovascular: Positive for leg swelling. Negative for chest pain and palpitations.   Gastrointestinal: Positive for abdominal distention and abdominal pain. Negative for constipation, diarrhea and vomiting.   Endocrine: Negative.    Genitourinary: Negative for decreased urine volume, difficulty urinating and dysuria.   Musculoskeletal: Negative for  arthralgias and back pain.   Skin: Positive for wound.   Allergic/Immunologic: Positive for immunocompromised state.   Neurological: Positive for weakness. Negative for tremors and headaches.   Psychiatric/Behavioral: Negative for agitation and confusion.     Objective:     Vital Signs (Most Recent):  Temp: 98.1 °F (36.7 °C) (10/28/18 0800)  Pulse: 107 (10/28/18 0800)  Resp: 14 (10/28/18 0800)  BP: 135/85 (10/28/18 0800)  SpO2: 98 % (10/28/18 0800) Vital Signs (24h Range):  Temp:  [97 °F (36.1 °C)-98.1 °F (36.7 °C)] 98.1 °F (36.7 °C)  Pulse:  [] 107  Resp:  [10-18] 14  SpO2:  [96 %-100 %] 98 %  BP: (108-135)/(44-88) 135/85     Weight: 73.4 kg (161 lb 13.1 oz)  Body mass index is 28.66 kg/m².    Intake/Output - Last 3 Shifts       10/26 0700 - 10/27 0659 10/27 0700 - 10/28 0659 10/28 0700 - 10/29 0659    P.O. 915 920     Blood  250     Total Intake(mL/kg) 915 (12.5) 1170 (15.9)     Urine (mL/kg/hr) 950 (0.5) 1200 (0.7)     Emesis/NG output 0 0     Other 0 0     Stool 0 0     Blood 0 0     Total Output 950 1200     Net -35 -30            Urine Occurrence 0 x 4 x     Stool Occurrence 0 x 1 x     Emesis Occurrence 0 x 0 x           Physical Exam   Constitutional: She is oriented to person, place, and time.   Temporal and distal extremity muscle wasting   HENT:   Head: Normocephalic and atraumatic.   Eyes: EOM are normal. Pupils are equal, round, and reactive to light. Scleral icterus is present.   Neck: Normal range of motion.   Cardiovascular: Normal rate and regular rhythm.   No murmur heard.  Pulmonary/Chest: Effort normal and breath sounds normal. No respiratory distress. She has no wheezes.   Abdominal: Soft. Bowel sounds are normal. She exhibits distension. There is tenderness.   Chevron incision w/ staples, CDI   Musculoskeletal: Normal range of motion. She exhibits edema (3+ BLE).   Neurological: She is alert and oriented to person, place, and time.   Skin: Skin is warm and dry.   Nursing note and vitals  reviewed.      Laboratory:  Immunosuppressants         Stop Route Frequency     tacrolimus capsule 0.5 mg      -- Oral 2 times daily     mycophenolate capsule 1,000 mg      -- Oral 2 times daily        CBC:   Recent Labs   Lab 10/28/18  0541   WBC 3.84*   RBC 2.65*   HGB 7.8*   HCT 24.0*   PLT 77*   MCV 91   MCH 29.4   MCHC 32.5     CMP:   Recent Labs   Lab 10/28/18  0541   *   CALCIUM 8.7   ALBUMIN 2.6*   PROT 4.3*   *   K 3.1*   CO2 27   CL 94*   *   CREATININE 2.2*   ALKPHOS 111   ALT 18   AST 23   BILITOT 3.7*     Labs within the past 24 hours have been reviewed.    Diagnostic Results:  I have personally reviewed all pertinent imaging studies.

## 2018-10-28 NOTE — ASSESSMENT & PLAN NOTE
- Fluctuating H/H.    - Transfuse 10/14 and 10/16 (1 unit); 10/18 (2 units); 10/19 (1 unit), 10/22 (1 unit).  - Decreased platelets -- 1 u plt given 10/18.  - DDAVP given 10/18 and 10/19.  - FOBT +.  - LDH elevated, Hapto < 10.  - EGD 10/18: ulcerated duodenal mucosa.   - Monitoring w/ CBC q12hr to assess the need for transfusion.  - H&H 6.6/20.2 on 10/27. Transfused 1 unit PRBCs. Repeat H&H following transfusion improved.  - H&H stable today.

## 2018-10-28 NOTE — ASSESSMENT & PLAN NOTE
- Creatinine on arrival 0.8 and trended up.  - BUN/cr (10/11/18): 91/1.9.   - Tacrolimus 26.9 (10/08).  - Nephrology consulted.  - Renal US (10/11): no hydronephrosis.  - Echo 10/12: diastolic dysfunction, likely 2/2 to ARF.  - pt on renal diet w 1500 ml FR.  - CRRT 10/14; SLED 10/17, 10/20. --> no improvements noted in kidney fxn. Per nephrology, does not think SLED provides any benefit.  - Continue with lasix as patient needs diuresis. Lasix 40 mg IV x 2 again today.

## 2018-10-28 NOTE — ASSESSMENT & PLAN NOTE
- H&H cont to fluctuate.   - FOBT +  - EGD 10/18- showed diffuse bleeding with ulcerated duodenal mucosa   - Monitoring w/ CBC q 12 to assess the need for transfusion.  - 1 unit PRBCs transfused 10/27. H&H stable today.

## 2018-10-28 NOTE — PROGRESS NOTES
Plan of care reviewed with pt and pt's .  Daughter pulled self meds this morning 100%.  K 3.1 this am-replaced with k bicarb tab. H/H trending down-this am was 7.8/24.  Will cont to monitor.

## 2018-10-29 LAB
ALBUMIN SERPL BCP-MCNC: 2.6 G/DL
ALP SERPL-CCNC: 114 U/L
ALT SERPL W/O P-5'-P-CCNC: 18 U/L
ANION GAP SERPL CALC-SCNC: 13 MMOL/L
AST SERPL-CCNC: 21 U/L
BASOPHILS # BLD AUTO: 0 K/UL
BASOPHILS # BLD AUTO: 0 K/UL
BASOPHILS NFR BLD: 0 %
BASOPHILS NFR BLD: 0 %
BILIRUB SERPL-MCNC: 3 MG/DL
BLD PROD TYP BPU: NORMAL
BLOOD UNIT EXPIRATION DATE: NORMAL
BLOOD UNIT TYPE CODE: 6200
BLOOD UNIT TYPE: NORMAL
BUN SERPL-MCNC: 126 MG/DL
CALCIUM SERPL-MCNC: 8.8 MG/DL
CHLORIDE SERPL-SCNC: 94 MMOL/L
CO2 SERPL-SCNC: 26 MMOL/L
CODING SYSTEM: NORMAL
CREAT SERPL-MCNC: 2.3 MG/DL
DIFFERENTIAL METHOD: ABNORMAL
DIFFERENTIAL METHOD: ABNORMAL
DISPENSE STATUS: NORMAL
EOSINOPHIL # BLD AUTO: 0 K/UL
EOSINOPHIL # BLD AUTO: 0.1 K/UL
EOSINOPHIL NFR BLD: 0 %
EOSINOPHIL NFR BLD: 2.3 %
ERYTHROCYTE [DISTWIDTH] IN BLOOD BY AUTOMATED COUNT: 18.3 %
ERYTHROCYTE [DISTWIDTH] IN BLOOD BY AUTOMATED COUNT: 19.4 %
EST. GFR  (AFRICAN AMERICAN): 24.3 ML/MIN/1.73 M^2
EST. GFR  (NON AFRICAN AMERICAN): 21.1 ML/MIN/1.73 M^2
GLUCOSE SERPL-MCNC: 134 MG/DL
HCT VFR BLD AUTO: 22.7 %
HCT VFR BLD AUTO: 28.7 %
HGB BLD-MCNC: 7.5 G/DL
HGB BLD-MCNC: 9.5 G/DL
IMM GRANULOCYTES # BLD AUTO: 0.04 K/UL
IMM GRANULOCYTES # BLD AUTO: 0.05 K/UL
IMM GRANULOCYTES NFR BLD AUTO: 1.1 %
IMM GRANULOCYTES NFR BLD AUTO: 1.4 %
INR PPP: 1
LYMPHOCYTES # BLD AUTO: 0.1 K/UL
LYMPHOCYTES # BLD AUTO: 0.2 K/UL
LYMPHOCYTES NFR BLD: 3.6 %
LYMPHOCYTES NFR BLD: 6.3 %
MAGNESIUM SERPL-MCNC: 1.8 MG/DL
MCH RBC QN AUTO: 29.8 PG
MCH RBC QN AUTO: 30.1 PG
MCHC RBC AUTO-ENTMCNC: 33 G/DL
MCHC RBC AUTO-ENTMCNC: 33.1 G/DL
MCV RBC AUTO: 90 FL
MCV RBC AUTO: 91 FL
MONOCYTES # BLD AUTO: 0.2 K/UL
MONOCYTES # BLD AUTO: 0.3 K/UL
MONOCYTES NFR BLD: 4.4 %
MONOCYTES NFR BLD: 7.1 %
NEUTROPHILS # BLD AUTO: 2.9 K/UL
NEUTROPHILS # BLD AUTO: 3.3 K/UL
NEUTROPHILS NFR BLD: 82.9 %
NEUTROPHILS NFR BLD: 90.9 %
NRBC BLD-RTO: 0 /100 WBC
NRBC BLD-RTO: 0 /100 WBC
PHOSPHATE SERPL-MCNC: 4.1 MG/DL
PLATELET # BLD AUTO: 105 K/UL
PLATELET # BLD AUTO: 95 K/UL
PMV BLD AUTO: 11.1 FL
PMV BLD AUTO: 11.7 FL
POCT GLUCOSE: 126 MG/DL (ref 70–110)
POCT GLUCOSE: 161 MG/DL (ref 70–110)
POCT GLUCOSE: 212 MG/DL (ref 70–110)
POTASSIUM SERPL-SCNC: 3.6 MMOL/L
PROT SERPL-MCNC: 4.3 G/DL
PROTHROMBIN TIME: 10.4 SEC
RBC # BLD AUTO: 2.49 M/UL
RBC # BLD AUTO: 3.19 M/UL
SODIUM SERPL-SCNC: 133 MMOL/L
TACROLIMUS BLD-MCNC: 3.5 NG/ML
TRANS ERYTHROCYTES VOL PATIENT: NORMAL ML
WBC # BLD AUTO: 3.52 K/UL
WBC # BLD AUTO: 3.64 K/UL

## 2018-10-29 PROCEDURE — 25000003 PHARM REV CODE 250: Performed by: PHYSICIAN ASSISTANT

## 2018-10-29 PROCEDURE — C9113 INJ PANTOPRAZOLE SODIUM, VIA: HCPCS | Performed by: PHYSICIAN ASSISTANT

## 2018-10-29 PROCEDURE — 80053 COMPREHEN METABOLIC PANEL: CPT

## 2018-10-29 PROCEDURE — 63600175 PHARM REV CODE 636 W HCPCS: Performed by: NURSE PRACTITIONER

## 2018-10-29 PROCEDURE — 97116 GAIT TRAINING THERAPY: CPT

## 2018-10-29 PROCEDURE — 85025 COMPLETE CBC W/AUTO DIFF WBC: CPT

## 2018-10-29 PROCEDURE — 25000003 PHARM REV CODE 250: Performed by: STUDENT IN AN ORGANIZED HEALTH CARE EDUCATION/TRAINING PROGRAM

## 2018-10-29 PROCEDURE — 25000003 PHARM REV CODE 250: Performed by: NURSE PRACTITIONER

## 2018-10-29 PROCEDURE — 27000646 HC AEROBIKA DEVICE

## 2018-10-29 PROCEDURE — 84100 ASSAY OF PHOSPHORUS: CPT

## 2018-10-29 PROCEDURE — 97530 THERAPEUTIC ACTIVITIES: CPT

## 2018-10-29 PROCEDURE — 94761 N-INVAS EAR/PLS OXIMETRY MLT: CPT

## 2018-10-29 PROCEDURE — 63600175 PHARM REV CODE 636 W HCPCS: Performed by: STUDENT IN AN ORGANIZED HEALTH CARE EDUCATION/TRAINING PROGRAM

## 2018-10-29 PROCEDURE — 99232 SBSQ HOSP IP/OBS MODERATE 35: CPT | Mod: ,,, | Performed by: INTERNAL MEDICINE

## 2018-10-29 PROCEDURE — 63600175 PHARM REV CODE 636 W HCPCS: Performed by: PHYSICIAN ASSISTANT

## 2018-10-29 PROCEDURE — 99233 SBSQ HOSP IP/OBS HIGH 50: CPT | Mod: 24,,, | Performed by: PHYSICIAN ASSISTANT

## 2018-10-29 PROCEDURE — 83735 ASSAY OF MAGNESIUM: CPT

## 2018-10-29 PROCEDURE — 85610 PROTHROMBIN TIME: CPT

## 2018-10-29 PROCEDURE — P9021 RED BLOOD CELLS UNIT: HCPCS

## 2018-10-29 PROCEDURE — 99900035 HC TECH TIME PER 15 MIN (STAT)

## 2018-10-29 PROCEDURE — 94664 DEMO&/EVAL PT USE INHALER: CPT

## 2018-10-29 PROCEDURE — 80197 ASSAY OF TACROLIMUS: CPT

## 2018-10-29 PROCEDURE — 25000003 PHARM REV CODE 250: Performed by: TRANSPLANT SURGERY

## 2018-10-29 PROCEDURE — 20600001 HC STEP DOWN PRIVATE ROOM

## 2018-10-29 PROCEDURE — 36430 TRANSFUSION BLD/BLD COMPNT: CPT

## 2018-10-29 RX ORDER — INSULIN ASPART 100 [IU]/ML
16 INJECTION, SOLUTION INTRAVENOUS; SUBCUTANEOUS
Status: DISCONTINUED | OUTPATIENT
Start: 2018-10-30 | End: 2018-10-30

## 2018-10-29 RX ORDER — HYDROCODONE BITARTRATE AND ACETAMINOPHEN 500; 5 MG/1; MG/1
TABLET ORAL
Status: DISCONTINUED | OUTPATIENT
Start: 2018-10-29 | End: 2018-10-30

## 2018-10-29 RX ADMIN — PANTOPRAZOLE SODIUM 40 MG: 40 INJECTION, POWDER, FOR SOLUTION INTRAVENOUS at 08:10

## 2018-10-29 RX ADMIN — MAGNESIUM OXIDE TAB 400 MG (241.3 MG ELEMENTAL MG) 800 MG: 400 (241.3 MG) TAB at 08:10

## 2018-10-29 RX ADMIN — VALGANCICLOVIR 450 MG: 450 TABLET, FILM COATED ORAL at 08:10

## 2018-10-29 RX ADMIN — FLUCONAZOLE 200 MG: 200 TABLET ORAL at 08:10

## 2018-10-29 RX ADMIN — PANTOPRAZOLE SODIUM 40 MG: 40 INJECTION, POWDER, FOR SOLUTION INTRAVENOUS at 09:10

## 2018-10-29 RX ADMIN — SULFAMETHOXAZOLE AND TRIMETHOPRIM 1 TABLET: 400; 80 TABLET ORAL at 08:10

## 2018-10-29 RX ADMIN — HYDROCORTISONE SODIUM SUCCINATE 25 MG: 100 INJECTION, POWDER, FOR SOLUTION INTRAMUSCULAR; INTRAVENOUS at 02:10

## 2018-10-29 RX ADMIN — INSULIN ASPART 14 UNITS: 100 INJECTION, SOLUTION INTRAVENOUS; SUBCUTANEOUS at 08:10

## 2018-10-29 RX ADMIN — MYCOPHENOLATE MOFETIL 1000 MG: 250 CAPSULE ORAL at 08:10

## 2018-10-29 RX ADMIN — INSULIN ASPART 14 UNITS: 100 INJECTION, SOLUTION INTRAVENOUS; SUBCUTANEOUS at 07:10

## 2018-10-29 RX ADMIN — MIDODRINE HYDROCHLORIDE 15 MG: 5 TABLET ORAL at 09:10

## 2018-10-29 RX ADMIN — INSULIN ASPART 2 UNITS: 100 INJECTION, SOLUTION INTRAVENOUS; SUBCUTANEOUS at 02:10

## 2018-10-29 RX ADMIN — MIDODRINE HYDROCHLORIDE 15 MG: 5 TABLET ORAL at 02:10

## 2018-10-29 RX ADMIN — DOCUSATE SODIUM 100 MG: 100 CAPSULE, LIQUID FILLED ORAL at 08:10

## 2018-10-29 RX ADMIN — AMOXICILLIN 500 MG: 500 CAPSULE ORAL at 09:10

## 2018-10-29 RX ADMIN — MAGNESIUM OXIDE TAB 400 MG (241.3 MG ELEMENTAL MG) 800 MG: 400 (241.3 MG) TAB at 09:10

## 2018-10-29 RX ADMIN — URSODIOL 300 MG: 300 CAPSULE ORAL at 08:10

## 2018-10-29 RX ADMIN — PREDNISONE 10 MG: 10 TABLET ORAL at 08:10

## 2018-10-29 RX ADMIN — INSULIN ASPART 14 UNITS: 100 INJECTION, SOLUTION INTRAVENOUS; SUBCUTANEOUS at 02:10

## 2018-10-29 RX ADMIN — POLYETHYLENE GLYCOL 3350 17 G: 17 POWDER, FOR SOLUTION ORAL at 08:10

## 2018-10-29 RX ADMIN — TRAZODONE HYDROCHLORIDE 25 MG: 50 TABLET ORAL at 09:10

## 2018-10-29 RX ADMIN — AMOXICILLIN 500 MG: 500 CAPSULE ORAL at 08:10

## 2018-10-29 RX ADMIN — MIDODRINE HYDROCHLORIDE 15 MG: 5 TABLET ORAL at 08:10

## 2018-10-29 RX ADMIN — MIRTAZAPINE 7.5 MG: 7.5 TABLET ORAL at 09:10

## 2018-10-29 RX ADMIN — TACROLIMUS 0.5 MG: 0.5 CAPSULE ORAL at 08:10

## 2018-10-29 RX ADMIN — HYDROCORTISONE SODIUM SUCCINATE 25 MG: 100 INJECTION, POWDER, FOR SOLUTION INTRAMUSCULAR; INTRAVENOUS at 09:10

## 2018-10-29 RX ADMIN — MYCOPHENOLATE MOFETIL 1000 MG: 250 CAPSULE ORAL at 09:10

## 2018-10-29 RX ADMIN — URSODIOL 300 MG: 300 CAPSULE ORAL at 09:10

## 2018-10-29 RX ADMIN — LEVOFLOXACIN 250 MG: 250 TABLET, FILM COATED ORAL at 08:10

## 2018-10-29 NOTE — ASSESSMENT & PLAN NOTE
Managed per primary team  Avoid hypoglycemia    Recent Labs   Lab 10/29/18  0530   ALT 18   AST 21   ALKPHOS 114   BILITOT 3.0*   PROT 4.3*   ALBUMIN 2.6*

## 2018-10-29 NOTE — CARE UPDATE
BG goal 140-180.       BG at or slightly above goal on novolog 14 units with meals plus correction scale. Prednisone 10 mg this AM; now discontinued. Started on hydrocortisone 25 mg every 8 hours started.       Increase novolog 16 units with meals. Continue BG monitoring ac/hs and low dose correction scale given kidney function.       ** Please call Endocrine for any BG related issues **

## 2018-10-29 NOTE — PROGRESS NOTES
[facility]  Nephrology  Progress Note    Patient Name: Scarlett Reddy  MRN: [id number]  Admission Date: 10/1/2018  Hospital Length of Stay: 27 days  Attending Provider: Jani Theodore MD   Primary Care Physician: Primary Doctor No  Principal Problem:Liver transplanted    Subjective:     HPI: 67 y/o female with ESLD due to Hep B and D cirrhosis MELD of 23 on 10/01/18.  Paracentesis 10/1 with 5L removed, no fluid sent for analysis. 10/02 significant for hyponatremia, Na 119 and she was admitted to LTS for sodium monitoring. On 10/05/18 she is s/p DBDLT (CMV D+/R+, steroid induction, MMF/tacro/pred maintenance) with take back on 10/6 secondary to hyperbilirubinema, no leak identified. Post-op course complicated by hypercapneic and hypoxic respiratory failure and was intubated 10/06 and extubated 10/07. Liver bx (10/6) sig for Zone 3 hemorrhage and collapse, in addition to cholestasis. ID consulted to comment on positive diphtheroid culture from ascitic fluid (likely skin colonization) as well as ESBL Klebsiella pneumoniae in urine culture (10/4).  Pt asymptomatic and cell count from ascitic fluid unremarkable. Mild peritransplant ascites- repeat US 10/6 with increased arterial resistive indices. Repeat US 10/9 w/ continued elevation in RIs and fluid collections. LFTs trending down nicely.     Patient is currently on entecavir 0.5 mg, Hepagam, mycophenalate 1000 mg BID, prednisone 20 mg and prophylaxis with Bactrim, fluconazole and Valgancyclovir.    Nephrology consulted for acute kidney failure is setting of Orthotopic Liver Transplant. Cr 0.8 on 10/05, started to trend up and 1.5 (10/09), 1.8 (10/10), and 1.9 (10/11). UOP 10/10/18: 520 ml. UA (10/09): 1+ occult blood and 1+ protein with RBC and amorphous casts.        Interval History: Patient with no acute events overnight. States that she is having good appetite. She remains to urinate well with daily lasix    Review of patient's allergies  indicates:  No Known Allergies  Current Facility-Administered Medications   Medication Frequency    0.9%  NaCl infusion (for blood administration) Q24H PRN    amoxicillin capsule 500 mg Q12H    bisacodyl EC tablet 10 mg Daily PRN    bisacodyl suppository 10 mg Daily PRN    calcium carbonate 200 mg calcium (500 mg) chewable tablet 500 mg TID PRN    dextrose 50% injection 12.5 g PRN    dextrose 50% injection 25 g PRN    docusate sodium capsule 100 mg Daily    entecavir tablet 0.5 mg Q72H    fluconazole tablet 200 mg Daily    glucagon (human recombinant) injection 1 mg PRN    glucose chewable tablet 16 g PRN    glucose chewable tablet 24 g PRN    hydrocortisone sodium succinate injection 25 mg Q8H    insulin aspart U-100 pen 0-5 Units QID (AC + HS) PRN    insulin aspart U-100 pen 14 Units TIDWM    levoFLOXacin tablet 250 mg Daily    magnesium oxide tablet 800 mg BID    midodrine tablet 15 mg TID    mirtazapine tablet 7.5 mg QHS    mycophenolate capsule 1,000 mg BID    ondansetron disintegrating tablet 8 mg Q8H PRN    ondansetron injection 4 mg Q8H PRN    oxyCODONE immediate release tablet 5 mg Q4H PRN    oxyCODONE immediate release tablet Tab 10 mg Q4H PRN    pantoprazole injection 40 mg BID    polyethylene glycol packet 17 g Daily PRN    polyethylene glycol packet 17 g Daily    promethazine (PHENERGAN) 6.25 mg in dextrose 5 % 50 mL IVPB Q6H PRN    sodium chloride 0.9% flush 3 mL PRN    sulfamethoxazole-trimethoprim 400-80mg per tablet 1 tablet Every Mon, Wed, Fri    trazodone split tablet 25 mg QHS    ursodiol capsule 300 mg BID    valGANciclovir tablet 450 mg Twice Weekly       Objective:     Vital Signs (Most Recent):  Temp: 97.7 °F (36.5 °C) (10/29/18 1138)  Pulse: 103 (10/29/18 1145)  Resp: (!) 23 (10/29/18 1145)  BP: 115/74 (10/29/18 1145)  SpO2: 100 % (10/29/18 1145)  O2 Device (Oxygen Therapy): room air (10/29/18 1301) Vital Signs (24h Range):  Temp:  [97.4 °F (36.3 °C)-98 °F  (36.7 °C)] 97.7 °F (36.5 °C)  Pulse:  [] 103  Resp:  [9-23] 23  SpO2:  [96 %-100 %] 100 %  BP: (108-127)/(67-80) 115/74     Weight: 73.4 kg (161 lb 13.1 oz) (10/28/18 0500)  Body mass index is 28.66 kg/m².  Body surface area is 1.81 meters squared.    I/O last 3 completed shifts:  In: 975 [P.O.:975]  Out: 1550 [Urine:1550]    Physical Exam   Constitutional: She is oriented to person, place, and time.   Temporal and distal extremity muscle wasting   Eyes: EOM are normal. Pupils are equal, round, and reactive to light. Scleral icterus is present.   Neck: Normal range of motion.   Cardiovascular: Normal rate and regular rhythm.   No murmur heard.  Pulmonary/Chest: Effort normal and breath sounds normal. No respiratory distress. She has no wheezes.   Abdominal: Soft. Bowel sounds are normal. She exhibits distension. There is tenderness. There is no guarding.   Chevron incision w/ staples, CDI   Musculoskeletal: Normal range of motion. She exhibits edema (3+ BLE).   Neurological: She is alert and oriented to person, place, and time.   Skin: Skin is warm and dry.   Nursing note and vitals reviewed.      Significant Labs:  CBC:   Recent Labs   Lab 10/29/18  0530   WBC 3.52*   RBC 2.49*   HGB 7.5*   HCT 22.7*   PLT 95*   MCV 91   MCH 30.1   MCHC 33.0     CMP:   Recent Labs   Lab 10/29/18  0530   *   CALCIUM 8.8   ALBUMIN 2.6*   PROT 4.3*   *   K 3.6   CO2 26   CL 94*   *   CREATININE 2.3*   ALKPHOS 114   ALT 18   AST 21   BILITOT 3.0*     All labs within the past 24 hours have been reviewed.     Significant Imaging:  Labs: Reviewed    Assessment/Plan:      Post LT - Acute renal failure    Post-LT EVA is multifactorial in origin and has been related to the severity of liver disease, toxicity of immunosuppressive therapy and hepatic ischaemia reperfusion injury might be a driving force in the aetiology of post-LT EVA.      - acute gradual worsening of renal function s/p OLT  - 10/06 was taken to the  OR for possibility of billiary leak and post op complication due to hypercapnic hypoxic respiratory failure, no blood pressure drop intraoperatively per anesthesiology notes. Patient has not been needing vasopressors to maintain BP  - Creatinine on arrival 0.8 and has been up trending   - BUN/cr (10/11/18): 91/1.9; (10/12/18): 100/2.0; (10/13/18): 110/2.4  - Tacrolimus 26.9 (10/08); 6.8 (10/12); 5.6 (10/13), remains in none nephrotoxic levels   - Protein Creatine ratio: 0.82  - UA consistent with hematuria, proteinuria, RBC and amorphous casts   - Fena 0.5%; consistent with hypovolemia   - Renal US negative for obstruction, stricture or, hydronephrosis    Assessment: Acute Renal failure in setting of hepatic ischemia reperfusion injury and poor perfusion status vs acute tubular necrosis from immunosuppressive agents      Plan:  -SLED for uremic toxin clearance was perfromed over the weekend for uremic platelet dysfunction. She remained to be anemic and still required PRBCs. Will continue to watch her kidney function but for now do not think that SLED provided any benefit    - Continue with lasix at patient needs diuresis, defer to primary team for dosing, can give albumin to augment lasix  - Replete her magnesium as it remains low   -Modified renal diet : decrease in protein , normal intake of K and phos   - avoid nephrotoxic drugs, ACEI/ARB, NSAIDS  - adjust drugs to GFR  - Agree with fluid restriction of 1500 cc/day  - Acute RRT not indicated at this point  - Please collect a 24 hour urine creatine clearance ( this will allow a better understanding of her serum creatinine)   - Please get orthostatics ( will help with volume status determination)                                    Thank you for your consult. I will follow-up with patient. Please contact us if you have any additional questions.    Vaishali iJ MD  Nephrology  [facility]

## 2018-10-29 NOTE — ASSESSMENT & PLAN NOTE
-  Cellcept/Prograf/pred.-- stopped prednisone and prograf today (10/29). Will resume w/ improvements in kidney function  -started on hydrocortisone today (10/29)   -  Will monitor for signs of toxic side effects, check daily tacrolimus troughs, and change meds accordingly.

## 2018-10-29 NOTE — PLAN OF CARE
Problem: Patient Care Overview  Goal: Plan of Care Review  Outcome: Ongoing (interventions implemented as appropriate)  AAOx4, VSS, SpO- >96% on RA, tele- HR normal sinus rhythm- sinus tach on exertion, AC/HS- night time sugar= 194, 1 U insulin given, isolation precautions continued for + ESBL in urine  Afebrile overnight- WBC 4.34, amoxicillin 500 PO q12h continued  H/H stable, Repeat H/H= 8.2/25.3  40mg IVP lasix given BID, refer to chart for UOP, Cr yesterday AM 2.2. Pasquale hose in use, heparin d/c'd, 1 BM overnight, PRN suppository +   Up with 2x assist + walker in use.   Bed in lowest position, non skid socks worn, call light within reach. Will continue to monitor.

## 2018-10-29 NOTE — ASSESSMENT & PLAN NOTE
- Creatinine on arrival 0.8 and trended up.  - BUN/cr (10/11/18): 91/1.9.   - Tacrolimus 26.9 (10/08).  - Nephrology consulted.  - Renal US (10/11): no hydronephrosis.  - Echo 10/12: diastolic dysfunction, likely 2/2 to ARF.  - pt on renal diet w 1500 ml FR.  - CRRT 10/14; SLED 10/17, 10/20. --> no improvements noted in kidney fxn.  - Continue with lasix as patient needs diuresis.    - today-- will discuss w/ nephrology about dialyzing

## 2018-10-29 NOTE — ASSESSMENT & PLAN NOTE
BG goal 140-180.     Decrease novolog to 12 units with meals  Low dose correction scale given kidney function.  BG monitoring AC/HS    Discharge planning:  [place]

## 2018-10-29 NOTE — ASSESSMENT & PLAN NOTE
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      - acute gradual worsening of renal function s/p OLT  - 10/06 was taken to the OR for possibility of billiary leak and post op complication due to hypercapnic hypoxic respiratory failure, no blood pressure drop intraoperatively per anesthesiology notes. Patient has not been needing vasopressors to maintain BP  - Creatinine on arrival 0.8 and has been up trending   - BUN/cr (10/11/18): 91/1.9; (10/12/18): 100/2.0; (10/13/18): 110/2.4  - Tacrolimus 26.9 (10/08); 6.8 (10/12); 5.6 (10/13), remains in none nephrotoxic levels   - Protein Creatine ratio: 0.82  - UA consistent with hematuria, proteinuria, RBC and amorphous casts   - Fena 0.5%; consistent with hypovolemia   - Renal US negative for obstruction, stricture or, hydronephrosis    Assessment: Acute Renal failure in setting of hepatic ischemia reperfusion injury and poor perfusion status vs acute tubular necrosis from immunosuppressive agents      Plan:  -SLED for uremic toxin clearance was perfromed over the weekend for uremic platelet dysfunction. She remained to be anemic and still required PRBCs. Will continue to watch her kidney function but for now do not think that SLED provided any benefit    - Continue with lasix at patient needs diuresis, defer to primary team for dosing, can give albumin to augment lasix  - Replete her magnesium as it remains low   -Modified renal diet : decrease in protein , normal intake of K and phos   - avoid nephrotoxic drugs, ACEI/ARB, NSAIDS  - adjust drugs to GFR  - Agree with fluid restriction of 1500 cc/day  - Acute RRT not indicated at this point  - Please collect a 24 hour urine creatine clearance ( this will allow a better understanding of her serum creatinine)   - Please get orthostatics (  will help with volume status determination)

## 2018-10-29 NOTE — SUBJECTIVE & OBJECTIVE
Scheduled Meds:   amoxicillin  500 mg Oral Q12H    docusate sodium  100 mg Oral Daily    entecavir  0.5 mg Oral Q72H    fluconazole  200 mg Oral Daily    hydrocortisone sodium succinate  25 mg Intravenous Q8H    insulin aspart U-100  14 Units Subcutaneous TIDWM    levoFLOXacin  250 mg Oral Daily    magnesium oxide  800 mg Oral BID    midodrine  15 mg Oral TID    mirtazapine  7.5 mg Oral QHS    mycophenolate  1,000 mg Oral BID    pantoprazole  40 mg Intravenous BID    polyethylene glycol  17 g Oral Daily    sulfamethoxazole-trimethoprim 400-80mg  1 tablet Oral Every Mon, Wed, Fri    traZODone  25 mg Oral QHS    ursodiol  300 mg Oral BID    valGANciclovir  450 mg Oral Twice Weekly     Continuous Infusions:  PRN Meds:sodium chloride, bisacodyl, bisacodyl, calcium carbonate, dextrose 50%, dextrose 50%, glucagon (human recombinant), glucose, glucose, insulin aspart U-100, ondansetron, ondansetron, oxyCODONE, oxyCODONE, polyethylene glycol, promethazine (PHENERGAN) IVPB, sodium chloride 0.9%    Review of Systems   Constitutional: Positive for activity change and fatigue. Negative for chills and fever.   Respiratory: Negative for cough and shortness of breath.    Cardiovascular: Positive for leg swelling. Negative for chest pain.   Gastrointestinal: Positive for abdominal distention and abdominal pain. Negative for constipation, diarrhea and nausea.   Genitourinary: Negative for decreased urine volume, difficulty urinating and dysuria.   Musculoskeletal: Negative for arthralgias and back pain.   Allergic/Immunologic: Positive for immunocompromised state.   Neurological: Positive for weakness. Negative for tremors and headaches.   Psychiatric/Behavioral: Negative for confusion.     Objective:     Vital Signs (Most Recent):  Temp: 97.7 °F (36.5 °C) (10/29/18 1138)  Pulse: 103 (10/29/18 1145)  Resp: (!) 23 (10/29/18 1145)  BP: 115/74 (10/29/18 1145)  SpO2: 100 % (10/29/18 1145) Vital Signs (24h Range):  Temp:   [97.4 °F (36.3 °C)-98 °F (36.7 °C)] 97.7 °F (36.5 °C)  Pulse:  [] 103  Resp:  [9-23] 23  SpO2:  [96 %-100 %] 100 %  BP: (108-127)/(67-80) 115/74     Weight: 73.4 kg (161 lb 13.1 oz)  Body mass index is 28.66 kg/m².    Intake/Output - Last 3 Shifts       10/27 0700 - 10/28 0659 10/28 0700 - 10/29 0659 10/29 0700 - 10/30 0659    P.O. 920 625 590    Blood 250      Total Intake(mL/kg) 1170 (15.9) 625 (8.5) 590 (8)    Urine (mL/kg/hr) 1200 (0.7) 850 (0.5) 600 (1.4)    Emesis/NG output 0 0     Other 0 0     Stool 0 0 0    Blood 0 0     Total Output 1200 850 600    Net -30 -225 -10           Urine Occurrence 4 x 0 x     Stool Occurrence 1 x 2 x 1 x    Emesis Occurrence 0 x 0 x           Physical Exam   Constitutional: She is oriented to person, place, and time.   Temporal and distal extremity muscle wasting   Eyes: EOM are normal. Pupils are equal, round, and reactive to light. Scleral icterus is present.   Neck: Normal range of motion.   Cardiovascular: Normal rate and regular rhythm.   No murmur heard.  Pulmonary/Chest: Effort normal and breath sounds normal. No respiratory distress. She has no wheezes.   Abdominal: Soft. Bowel sounds are normal. She exhibits distension. There is tenderness. There is no guarding.   Chevron incision w/ staples, CDI   Musculoskeletal: Normal range of motion. She exhibits edema (3+ BLE).   Neurological: She is alert and oriented to person, place, and time.   Skin: Skin is warm and dry.   Nursing note and vitals reviewed.      Laboratory:  Immunosuppressants         Stop Route Frequency     mycophenolate capsule 1,000 mg      -- Oral 2 times daily        CBC:   Recent Labs   Lab 10/29/18  0530   WBC 3.52*   RBC 2.49*   HGB 7.5*   HCT 22.7*   PLT 95*   MCV 91   MCH 30.1   MCHC 33.0     CMP:   Recent Labs   Lab 10/29/18  0530   *   CALCIUM 8.8   ALBUMIN 2.6*   PROT 4.3*   *   K 3.6   CO2 26   CL 94*   *   CREATININE 2.3*   ALKPHOS 114   ALT 18   AST 21   BILITOT 3.0*      Labs within the past 24 hours have been reviewed.    Diagnostic Results:  I have personally reviewed all pertinent imaging studies.

## 2018-10-29 NOTE — ASSESSMENT & PLAN NOTE
- Fluctuating H/H.    - Transfuse 10/14 and 10/16 (1 unit); 10/18 (2 units); 10/19 (1 unit), 10/22 (1 unit), 10/27 (1u)  - Decreased platelets -- 1 u plt given 10/18.  - DDAVP given 10/18 and 10/19.  - FOBT +.  - LDH elevated, Hapto < 10.  - EGD 10/18: ulcerated duodenal mucosa.   - Monitoring w/ CBC q12hr to assess the need for transfusion.  - H&H decreased but stable at this time.

## 2018-10-29 NOTE — PLAN OF CARE
Problem: Physical Therapy Goal  Goal: Physical Therapy Goal  Goals to be met by: 2018    Patient will increase functional independence with mobility by performin. Supine to sit with Contact Guard Assistance - not met  2. Sit to stand transfer with Supervision using LRAD or no AD - not met  3. Bed to chair transfer with Supervision using LRAD or no AD - not met  4. Gait  x 250 feet with Supervision using LRAD or no AD - not met  5. Stand for 3 minutes with Supervision to increase activity tolerance - not met   6. Lower extremity exercise program x15 reps per handout, with independence - not met       Outcome: Ongoing (interventions implemented as appropriate)  Goals reviewed and remain appropriate. Pt progressing towards goals.    Radha Nguyen, PT, DPT   10/29/2018  [phone]

## 2018-10-29 NOTE — SUBJECTIVE & OBJECTIVE
"Interval HPI:   Overnight events: remains in TSU. BG at or slightly above goal; requiring correction scale at times in addition to scheduled novolog. Prednisone 10 mg daily; standard steroid taper.   Eatin%  Nausea: No  Hypoglycemia and intervention: No  Fever: No  TPN and/or TF: No    /67   Pulse 95   Temp 98 °F (36.7 °C) (Oral)   Resp 12   Ht 5' 3" (1.6 m)   Wt 73.4 kg (161 lb 13.1 oz)   LMP  (LMP Unknown)   SpO2 96%   Breastfeeding? No   BMI 28.66 kg/m²     Labs Reviewed and Include    Recent Labs   Lab 10/29/18  0530   *   CALCIUM 8.8   ALBUMIN 2.6*   PROT 4.3*   *   K 3.6   CO2 26   CL 94*   *   CREATININE 2.3*   ALKPHOS 114   ALT 18   AST 21   BILITOT 3.0*     Lab Results   Component Value Date    WBC 3.52 (L) 10/29/2018    HGB 7.5 (L) 10/29/2018    HCT 22.7 (L) 10/29/2018    MCV 91 10/29/2018    PLT 95 (L) 10/29/2018     No results for input(s): TSH, FREET4 in the last 168 hours.  Lab Results   Component Value Date    HGBA1C 6.0 (H) 10/04/2018       Nutritional status:   Body mass index is 28.66 kg/m².  Lab Results   Component Value Date    ALBUMIN 2.6 (L) 10/29/2018    ALBUMIN 2.6 (L) 10/28/2018    ALBUMIN 2.6 (L) 10/27/2018     Lab Results   Component Value Date    PREALBUMIN 12 (L) 2018       Estimated Creatinine Clearance: 22.2 mL/min (A) (based on SCr of 2.3 mg/dL (H)).    Accu-Checks  Recent Labs     10/26/18  2219 10/27/18  0905 10/27/18  1320 10/27/18  2000 10/27/18  2322 10/28/18  0809 10/28/18  1325 10/28/18  1806 10/28/18  2112 10/29/18  0838   POCTGLUCOSE 211* 119* 178* 164* 215* 182* 267* 210* 194* 161*       Current Medications and/or Treatments Impacting Glycemic Control  Immunotherapy:    Immunosuppressants         Stop Route Frequency     tacrolimus capsule 0.5 mg      -- Oral 2 times daily     mycophenolate capsule 1,000 mg      -- Oral 2 times daily        Steroids:   Hormones (From admission, onward)    Start     Stop Route Frequency Ordered "    11/07/18 0900  predniSONE tablet 2.5 mg      11/14 0859 Oral Daily 10/17/18 0943    10/31/18 0900  predniSONE tablet 5 mg      11/07 0859 Oral Daily 10/17/18 0943    10/24/18 0900  predniSONE tablet 10 mg      10/31 0859 Oral Daily 10/17/18 0943    10/17/18 0945  predniSONE tablet 15 mg      10/24 0859 Oral Daily 10/17/18 0943        Pressors:    Autonomic Drugs (From admission, onward)    Start     Stop Route Frequency Ordered    10/10/18 1500  midodrine tablet 15 mg      -- Oral 3 times daily 10/10/18 1122    10/06/18 1617  rocuronium (ZEMURON) 10 mg/mL injection     Comments:  Created by cabinet override    10/07 0429   10/06/18 1617        Hyperglycemia/Diabetes Medications:   Antihyperglycemics (From admission, onward)    Start     Stop Route Frequency Ordered    10/26/18 0715  insulin aspart U-100 pen 14 Units      -- SubQ 3 times daily with meals 10/25/18 1716    10/14/18 0925  insulin aspart U-100 pen 0-5 Units      -- SubQ Before meals & nightly PRN 10/14/18 0825    10/07/18 1200  insulin regular (Humulin R) 100 Units in sodium chloride 0.9% 100 mL infusion      10/13 2100 IV Continuous 10/07/18 105

## 2018-10-29 NOTE — PT/OT/SLP PROGRESS
Physical Therapy Treatment    Patient Name:  Scarlett Reddy   MRN:  [id number]    Recommendations:     Discharge Recommendations:  home health PT, home health OT   Discharge Equipment Recommendations: walker, rolling   Barriers to discharge: Decreased caregiver support at current functional level    Assessment:     Scarlett Reddy is a 68 y.o. female admitted with a medical diagnosis of Liver transplanted.  She presents with the following impairments/functional limitations:  weakness, impaired functional mobilty, impaired balance, impaired endurance, impaired self care skills, edema, pain, decreased safety awareness, impaired cardiopulmonary response to activity, decreased lower extremity function, gait instability. Pt ambulated in hallway this date, but demo'd generalized weakness and decreased endurance with pt requiring multiple standing rest breaks throughout. Pt reporting back pain during mobility, limiting further gait distance. Pt would continue to benefit from skilled acute PT in order to address current deficits and progress functional mobility.     Rehab Prognosis:  good; patient would benefit from acute skilled PT services to address these deficits and reach maximum level of function.      Recent Surgery: Procedure(s) (LRB):  EGD (ESOPHAGOGASTRODUODENOSCOPY) (N/A) 11 Days Post-Op    Plan:     During this hospitalization, patient to be seen 4 x/week to address the above listed problems via gait training, therapeutic activities, therapeutic exercises, neuromuscular re-education  · Plan of Care Expires:  11/05/18   Plan of Care Reviewed with: patient, spouse    Subjective     Communicated with RN prior to session.  Patient found UIC upon PT entry to room, agreeable to treatment.      Chief Complaint: back pain with gait   Pain/Comfort:  · Pain Rating 1: (reported back pain with gait but did not rate)  · Pain Addressed 1: Cessation of Activity    Patients cultural, spiritual, Protestant conflicts given the current  situation: none noted     Objective:     Patient found with: pulse ox (continuous), telemetry     General Precautions: Standard, fall, contact   Orthopedic Precautions:N/A   Braces: N/A     Functional Mobility:  · Transfers:     · Sit to Stand:  moderate assistance with rolling walker and x2 reps  · With cues for transfer technique with RW   · Toilet Transfer: contact guard assistance with  rolling walker and BSC over toilet  using  Stand Pivot  · Gait: ~150 ft. with RW and CGA  · Multiple, intermittent standing rest breaks throughout with pt reporting back pain during gait  · Decreased giuseppe, decreased step length, impaired weight-shifting ability, decreased toe-floor clearance       AM-PAC 6 CLICK MOBILITY  Turning over in bed (including adjusting bedclothes, sheets and blankets)?: 3  Sitting down on and standing up from a chair with arms (e.g., wheelchair, bedside commode, etc.): 2  Moving from lying on back to sitting on the side of the bed?: 3  Moving to and from a bed to a chair (including a wheelchair)?: 3  Need to walk in hospital room?: 3  Climbing 3-5 steps with a railing?: 2  Basic Mobility Total Score: 16       Therapeutic Activities and Exercises:  At 1st visit, pt ambulated from bedside chair to bathroom and transferred to toilet to void. Pt left seated on toilet, oriented to pull cord, and RN notified.   PT returned later in AM to perform ambulation in hallway as described above.    OLAF hose doffed prior to ambulation and donned following 2* pt reporting difficulty ambulating while OLAF hose are donned    Patient left up in chair with all lines intact and call button in reach..    GOALS:   Multidisciplinary Problems     Physical Therapy Goals        Problem: Physical Therapy Goal    Goal Priority Disciplines Outcome Goal Variances Interventions   Physical Therapy Goal     PT, PT/OT Ongoing (interventions implemented as appropriate)     Description:  Goals to be met by: 11/5/2018    Patient will  increase functional independence with mobility by performin. Supine to sit with Contact Guard Assistance - not met  2. Sit to stand transfer with Supervision using LRAD or no AD - not met  3. Bed to chair transfer with Supervision using LRAD or no AD - not met  4. Gait  x 250 feet with Supervision using LRAD or no AD - not met  5. Stand for 3 minutes with Supervision to increase activity tolerance - not met   6. Lower extremity exercise program x15 reps per handout, with independence - not met                        Time Tracking:     PT Received On: 10/29/18  PT Start Time: 1138     PT Stop Time: 1202  PT Total Time (min): 24 min     Billable Minutes: Gait Training 12 and Therapeutic Activity 12    Treatment Type: Treatment  PT/PTA: PT     PTA Visit Number: 0     Radha Nguyen PT, DPT   10/29/2018  [phone]

## 2018-10-29 NOTE — SUBJECTIVE & OBJECTIVE
Interval History: Patient with no acute events overnight. States that she is having good appetite. She remains to urinate well with daily lasix    Review of patient's allergies indicates:  No Known Allergies  Current Facility-Administered Medications   Medication Frequency    0.9%  NaCl infusion (for blood administration) Q24H PRN    amoxicillin capsule 500 mg Q12H    bisacodyl EC tablet 10 mg Daily PRN    bisacodyl suppository 10 mg Daily PRN    calcium carbonate 200 mg calcium (500 mg) chewable tablet 500 mg TID PRN    dextrose 50% injection 12.5 g PRN    dextrose 50% injection 25 g PRN    docusate sodium capsule 100 mg Daily    entecavir tablet 0.5 mg Q72H    fluconazole tablet 200 mg Daily    glucagon (human recombinant) injection 1 mg PRN    glucose chewable tablet 16 g PRN    glucose chewable tablet 24 g PRN    hydrocortisone sodium succinate injection 25 mg Q8H    insulin aspart U-100 pen 0-5 Units QID (AC + HS) PRN    insulin aspart U-100 pen 14 Units TIDWM    levoFLOXacin tablet 250 mg Daily    magnesium oxide tablet 800 mg BID    midodrine tablet 15 mg TID    mirtazapine tablet 7.5 mg QHS    mycophenolate capsule 1,000 mg BID    ondansetron disintegrating tablet 8 mg Q8H PRN    ondansetron injection 4 mg Q8H PRN    oxyCODONE immediate release tablet 5 mg Q4H PRN    oxyCODONE immediate release tablet Tab 10 mg Q4H PRN    pantoprazole injection 40 mg BID    polyethylene glycol packet 17 g Daily PRN    polyethylene glycol packet 17 g Daily    promethazine (PHENERGAN) 6.25 mg in dextrose 5 % 50 mL IVPB Q6H PRN    sodium chloride 0.9% flush 3 mL PRN    sulfamethoxazole-trimethoprim 400-80mg per tablet 1 tablet Every Mon, Wed, Fri    trazodone split tablet 25 mg QHS    ursodiol capsule 300 mg BID    valGANciclovir tablet 450 mg Twice Weekly       Objective:     Vital Signs (Most Recent):  Temp: 97.7 °F (36.5 °C) (10/29/18 1138)  Pulse: 103 (10/29/18 1145)  Resp: (!) 23 (10/29/18  1145)  BP: 115/74 (10/29/18 1145)  SpO2: 100 % (10/29/18 1145)  O2 Device (Oxygen Therapy): room air (10/29/18 1301) Vital Signs (24h Range):  Temp:  [97.4 °F (36.3 °C)-98 °F (36.7 °C)] 97.7 °F (36.5 °C)  Pulse:  [] 103  Resp:  [9-23] 23  SpO2:  [96 %-100 %] 100 %  BP: (108-127)/(67-80) 115/74     Weight: 73.4 kg (161 lb 13.1 oz) (10/28/18 0500)  Body mass index is 28.66 kg/m².  Body surface area is 1.81 meters squared.    I/O last 3 completed shifts:  In: 975 [P.O.:975]  Out: 1550 [Urine:1550]    Physical Exam   Constitutional: She is oriented to person, place, and time.   Temporal and distal extremity muscle wasting   Eyes: EOM are normal. Pupils are equal, round, and reactive to light. Scleral icterus is present.   Neck: Normal range of motion.   Cardiovascular: Normal rate and regular rhythm.   No murmur heard.  Pulmonary/Chest: Effort normal and breath sounds normal. No respiratory distress. She has no wheezes.   Abdominal: Soft. Bowel sounds are normal. She exhibits distension. There is tenderness. There is no guarding.   Chevron incision w/ staples, CDI   Musculoskeletal: Normal range of motion. She exhibits edema (3+ BLE).   Neurological: She is alert and oriented to person, place, and time.   Skin: Skin is warm and dry.   Nursing note and vitals reviewed.      Significant Labs:  CBC:   Recent Labs   Lab 10/29/18  0530   WBC 3.52*   RBC 2.49*   HGB 7.5*   HCT 22.7*   PLT 95*   MCV 91   MCH 30.1   MCHC 33.0     CMP:   Recent Labs   Lab 10/29/18  0530   *   CALCIUM 8.8   ALBUMIN 2.6*   PROT 4.3*   *   K 3.6   CO2 26   CL 94*   *   CREATININE 2.3*   ALKPHOS 114   ALT 18   AST 21   BILITOT 3.0*     All labs within the past 24 hours have been reviewed.     Significant Imaging:  Labs: Reviewed

## 2018-10-29 NOTE — ASSESSMENT & PLAN NOTE
Avoid insulin stacking and hypoglycemia.  Lab Results   Component Value Date    CREATININE 2.3 (H) 10/29/2018

## 2018-10-30 PROBLEM — R79.89 AZOTEMIA: Status: ACTIVE | Noted: 2018-10-30

## 2018-10-30 LAB
ALBUMIN SERPL BCP-MCNC: 2.5 G/DL
ALP SERPL-CCNC: 123 U/L
ALT SERPL W/O P-5'-P-CCNC: 17 U/L
ANION GAP SERPL CALC-SCNC: 11 MMOL/L
AST SERPL-CCNC: 23 U/L
BASOPHILS # BLD AUTO: 0 K/UL
BASOPHILS NFR BLD: 0 %
BILIRUB SERPL-MCNC: 4.2 MG/DL
BUN SERPL-MCNC: 126 MG/DL
CALCIUM SERPL-MCNC: 8.7 MG/DL
CHLORIDE SERPL-SCNC: 94 MMOL/L
CO2 SERPL-SCNC: 26 MMOL/L
CREAT CL/1.73 SQ M 12H UR+SERPL-ARVRAT: 7 ML/MIN
CREAT SERPL-MCNC: 2.3 MG/DL
CREAT SERPL-MCNC: 2.3 MG/DL
CREAT UR-MCNC: 32 MG/DL
CREATININE, URINE (MG/SPEC): 232 MG/SPEC
DIFFERENTIAL METHOD: ABNORMAL
EOSINOPHIL # BLD AUTO: 0 K/UL
EOSINOPHIL NFR BLD: 0.9 %
ERYTHROCYTE [DISTWIDTH] IN BLOOD BY AUTOMATED COUNT: 18.9 %
EST. GFR  (AFRICAN AMERICAN): 24.3 ML/MIN/1.73 M^2
EST. GFR  (NON AFRICAN AMERICAN): 21.1 ML/MIN/1.73 M^2
FUNGUS SPEC CULT: NORMAL
GLUCOSE SERPL-MCNC: 109 MG/DL
HCT VFR BLD AUTO: 25.1 %
HGB BLD-MCNC: 8.4 G/DL
IMM GRANULOCYTES # BLD AUTO: 0.04 K/UL
IMM GRANULOCYTES NFR BLD AUTO: 1.3 %
INR PPP: 1
LYMPHOCYTES # BLD AUTO: 0.2 K/UL
LYMPHOCYTES NFR BLD: 5.3 %
MAGNESIUM SERPL-MCNC: 1.8 MG/DL
MCH RBC QN AUTO: 30.1 PG
MCHC RBC AUTO-ENTMCNC: 33.5 G/DL
MCV RBC AUTO: 90 FL
MONOCYTES # BLD AUTO: 0.2 K/UL
MONOCYTES NFR BLD: 6.9 %
NEUTROPHILS # BLD AUTO: 2.7 K/UL
NEUTROPHILS NFR BLD: 85.6 %
NRBC BLD-RTO: 0 /100 WBC
PHOSPHATE SERPL-MCNC: 4.2 MG/DL
PLATELET # BLD AUTO: 99 K/UL
PMV BLD AUTO: 10.9 FL
POCT GLUCOSE: 105 MG/DL (ref 70–110)
POCT GLUCOSE: 140 MG/DL (ref 70–110)
POCT GLUCOSE: 141 MG/DL (ref 70–110)
POCT GLUCOSE: 175 MG/DL (ref 70–110)
POTASSIUM SERPL-SCNC: 3.5 MMOL/L
PROT SERPL-MCNC: 4.2 G/DL
PROTHROMBIN TIME: 10.6 SEC
RBC # BLD AUTO: 2.79 M/UL
SODIUM SERPL-SCNC: 131 MMOL/L
TACROLIMUS BLD-MCNC: 2.5 NG/ML
URINE COLLECTION DURATION: 24 HR
URINE VOLUME: 725 ML
WBC # BLD AUTO: 3.18 K/UL

## 2018-10-30 PROCEDURE — 94799 UNLISTED PULMONARY SVC/PX: CPT

## 2018-10-30 PROCEDURE — 63600175 PHARM REV CODE 636 W HCPCS: Performed by: PHYSICIAN ASSISTANT

## 2018-10-30 PROCEDURE — 84100 ASSAY OF PHOSPHORUS: CPT

## 2018-10-30 PROCEDURE — 25000003 PHARM REV CODE 250: Performed by: TRANSPLANT SURGERY

## 2018-10-30 PROCEDURE — 83735 ASSAY OF MAGNESIUM: CPT

## 2018-10-30 PROCEDURE — 82575 CREATININE CLEARANCE TEST: CPT

## 2018-10-30 PROCEDURE — 25000003 PHARM REV CODE 250: Performed by: NURSE PRACTITIONER

## 2018-10-30 PROCEDURE — 85025 COMPLETE CBC W/AUTO DIFF WBC: CPT

## 2018-10-30 PROCEDURE — 20600001 HC STEP DOWN PRIVATE ROOM

## 2018-10-30 PROCEDURE — C9113 INJ PANTOPRAZOLE SODIUM, VIA: HCPCS | Performed by: PHYSICIAN ASSISTANT

## 2018-10-30 PROCEDURE — 99233 SBSQ HOSP IP/OBS HIGH 50: CPT | Mod: 24,,, | Performed by: PHYSICIAN ASSISTANT

## 2018-10-30 PROCEDURE — 25000003 PHARM REV CODE 250: Performed by: PHYSICIAN ASSISTANT

## 2018-10-30 PROCEDURE — 80053 COMPREHEN METABOLIC PANEL: CPT

## 2018-10-30 PROCEDURE — 80197 ASSAY OF TACROLIMUS: CPT

## 2018-10-30 PROCEDURE — 94664 DEMO&/EVAL PT USE INHALER: CPT

## 2018-10-30 PROCEDURE — 99900035 HC TECH TIME PER 15 MIN (STAT)

## 2018-10-30 PROCEDURE — 97530 THERAPEUTIC ACTIVITIES: CPT

## 2018-10-30 PROCEDURE — 97110 THERAPEUTIC EXERCISES: CPT

## 2018-10-30 PROCEDURE — 85610 PROTHROMBIN TIME: CPT

## 2018-10-30 PROCEDURE — 99233 SBSQ HOSP IP/OBS HIGH 50: CPT | Mod: ,,, | Performed by: INTERNAL MEDICINE

## 2018-10-30 PROCEDURE — 63600175 PHARM REV CODE 636 W HCPCS: Performed by: STUDENT IN AN ORGANIZED HEALTH CARE EDUCATION/TRAINING PROGRAM

## 2018-10-30 PROCEDURE — 99232 SBSQ HOSP IP/OBS MODERATE 35: CPT | Mod: ,,, | Performed by: NURSE PRACTITIONER

## 2018-10-30 PROCEDURE — 25000003 PHARM REV CODE 250: Performed by: STUDENT IN AN ORGANIZED HEALTH CARE EDUCATION/TRAINING PROGRAM

## 2018-10-30 PROCEDURE — 94761 N-INVAS EAR/PLS OXIMETRY MLT: CPT

## 2018-10-30 RX ORDER — INSULIN ASPART 100 [IU]/ML
12 INJECTION, SOLUTION INTRAVENOUS; SUBCUTANEOUS
Status: DISCONTINUED | OUTPATIENT
Start: 2018-10-30 | End: 2018-11-01

## 2018-10-30 RX ADMIN — MYCOPHENOLATE MOFETIL 1000 MG: 250 CAPSULE ORAL at 08:10

## 2018-10-30 RX ADMIN — INSULIN ASPART 12 UNITS: 100 INJECTION, SOLUTION INTRAVENOUS; SUBCUTANEOUS at 08:10

## 2018-10-30 RX ADMIN — FLUCONAZOLE 200 MG: 200 TABLET ORAL at 08:10

## 2018-10-30 RX ADMIN — DOCUSATE SODIUM 100 MG: 100 CAPSULE, LIQUID FILLED ORAL at 08:10

## 2018-10-30 RX ADMIN — MIRTAZAPINE 7.5 MG: 7.5 TABLET ORAL at 09:10

## 2018-10-30 RX ADMIN — INSULIN ASPART 12 UNITS: 100 INJECTION, SOLUTION INTRAVENOUS; SUBCUTANEOUS at 01:10

## 2018-10-30 RX ADMIN — HYDROCORTISONE SODIUM SUCCINATE 25 MG: 100 INJECTION, POWDER, FOR SOLUTION INTRAMUSCULAR; INTRAVENOUS at 02:10

## 2018-10-30 RX ADMIN — INSULIN ASPART 12 UNITS: 100 INJECTION, SOLUTION INTRAVENOUS; SUBCUTANEOUS at 07:10

## 2018-10-30 RX ADMIN — MYCOPHENOLATE MOFETIL 1000 MG: 250 CAPSULE ORAL at 09:10

## 2018-10-30 RX ADMIN — MIDODRINE HYDROCHLORIDE 15 MG: 5 TABLET ORAL at 08:10

## 2018-10-30 RX ADMIN — HYDROCORTISONE SODIUM SUCCINATE 25 MG: 100 INJECTION, POWDER, FOR SOLUTION INTRAMUSCULAR; INTRAVENOUS at 09:10

## 2018-10-30 RX ADMIN — HYDROCORTISONE SODIUM SUCCINATE 25 MG: 100 INJECTION, POWDER, FOR SOLUTION INTRAMUSCULAR; INTRAVENOUS at 06:10

## 2018-10-30 RX ADMIN — MIDODRINE HYDROCHLORIDE 15 MG: 5 TABLET ORAL at 09:10

## 2018-10-30 RX ADMIN — AMOXICILLIN 500 MG: 500 CAPSULE ORAL at 09:10

## 2018-10-30 RX ADMIN — LEVOFLOXACIN 250 MG: 250 TABLET, FILM COATED ORAL at 08:10

## 2018-10-30 RX ADMIN — AMOXICILLIN 500 MG: 500 CAPSULE ORAL at 08:10

## 2018-10-30 RX ADMIN — ENTECAVIR 0.5 MG: 0.5 TABLET ORAL at 09:10

## 2018-10-30 RX ADMIN — PANTOPRAZOLE SODIUM 40 MG: 40 INJECTION, POWDER, FOR SOLUTION INTRAVENOUS at 09:10

## 2018-10-30 RX ADMIN — MAGNESIUM OXIDE TAB 400 MG (241.3 MG ELEMENTAL MG) 800 MG: 400 (241.3 MG) TAB at 08:10

## 2018-10-30 RX ADMIN — OXYCODONE HYDROCHLORIDE 5 MG: 5 TABLET ORAL at 04:10

## 2018-10-30 RX ADMIN — URSODIOL 300 MG: 300 CAPSULE ORAL at 08:10

## 2018-10-30 RX ADMIN — MAGNESIUM OXIDE TAB 400 MG (241.3 MG ELEMENTAL MG) 800 MG: 400 (241.3 MG) TAB at 09:10

## 2018-10-30 RX ADMIN — MIDODRINE HYDROCHLORIDE 15 MG: 5 TABLET ORAL at 02:10

## 2018-10-30 RX ADMIN — PANTOPRAZOLE SODIUM 40 MG: 40 INJECTION, POWDER, FOR SOLUTION INTRAVENOUS at 08:10

## 2018-10-30 RX ADMIN — URSODIOL 300 MG: 300 CAPSULE ORAL at 09:10

## 2018-10-30 NOTE — ASSESSMENT & PLAN NOTE
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      - acute gradual worsening of renal function s/p OLT  - 10/06 was taken to the OR for possibility of billiary leak and post op complication due to hypercapnic hypoxic respiratory failure, no blood pressure drop intraoperatively per anesthesiology notes. Patient has not been needing vasopressors to maintain BP  - Creatinine on arrival 0.8 and has been up trending   - BUN/cr (10/11/18): 91/1.9; (10/12/18): 100/2.0; (10/13/18): 110/2.4  - Tacrolimus 26.9 (10/08); 6.8 (10/12); 5.6 (10/13), remains in none nephrotoxic levels   - Protein Creatine ratio: 0.82  - UA consistent with hematuria, proteinuria, RBC and amorphous casts   - Fena 0.5%; consistent with hypovolemia   - Renal US negative for obstruction, stricture or, hydronephrosis    Assessment: Acute Renal failure in setting of hepatic ischemia reperfusion injury and poor perfusion status vs acute tubular necrosis from immunosuppressive agents      Plan:  -SLED for uremic toxin clearance was perfromed for uremic platelet dysfunction. She remained to be anemic and still required PRBCs. Will continue to watch her kidney function but for now do not think that SLED provided any benefit    - Stop lasix as patient is orthostatic , can give albumin  - recommend chest xray  - Replete her magnesium as it remains low   -Modified renal diet : decrease in protein , normal intake of K and phos   - avoid nephrotoxic drugs, ACEI/ARB, NSAIDS  - adjust drugs to GFR  - Agree with fluid restriction of 1500 cc/day  - Acute RRT not indicated at this point  - Pending creatine clearance if <20 will consider SLED  - Patient has azotemia ( elevated BUN) she is not uremic. The etiology is likely the GI bleed she is known to have which will cause elevation of  BUN.

## 2018-10-30 NOTE — PLAN OF CARE
Problem: Patient Care Overview  Goal: Plan of Care Review  Outcome: Ongoing (interventions implemented as appropriate)  Recommendation/Intervention:   1. Encourage increased PO intake and OS intake as tolerated   2. If TPN desired,  Recommend -95g AA, 280g Dextrose              - provides 95gm protein, 1332kcal +Lipds (GIR 2.5)   3. If TF desired, Recommend               -Novasource Renal at goal rate of 40mL/hr- provides 1920kcal, 87gm protein and 688mL free fluid.              -Glucerna 1.5 at goal rate of 55mL/hr - provides 1980kcal, 109gm and 1002mL free fluid.   4. Dietitian Following    Goals:   1. Patient to meet >85% EEN/EPN.   2. Promote nutrition related labs WNL  Nutrition Goal Status: progressing towards goal    NFPE completed 10/3/18      Severe malnutrition     Nutrition Problem  Severe Malnutrition in the context of Chronic Illness/Injury     Related to (etiology):  ESLD     Signs and Symptoms (as evidenced by):  Energy Intake: <75% of estimated energy requirement for 2 months  Body Fat Depletion: Severe depletion of orbitals and triceps   Muscle Mass Depletion: Severe depletion of temples, clavicle region and lower extremities   Weight Loss: 12.31% x 1 month     Nutrition Diagnosis Status:  Continues     Full assessment completed, see Dietitian Note 10/30/2018.

## 2018-10-30 NOTE — PROGRESS NOTES
"[facility]  Endocrinology  Progress Note    Admit Date: 10/1/2018     Reason for Consult: Management of type 2 DM, Hyperglycemia      Surgical Procedure and Date: Liver transplant 10/5/18, Ex lap 10/6/18     Diabetes diagnosis year:      Home Diabetes Medications:  Lantus 18 units HS and novolog 17 units with meals plus correction scale (150-200 +1)        Lab Results   Component Value Date     HGBA1C 6.8 (H) 2018        How often checking glucose at home? 3-4   BG readings on regimen: 120-150.  Post prandial readings as high as upper 200s  Hypoglycemia on the regimen? yes, none recently   Missed doses on regimen?  No     Diabetes Complications include:     Diabetic peripheral neuropathy      Complicating diabetes co morbidities:   CIRRHOSIS        HPI:  Patient is a 68 y.o. female with a diagnosis of ESLD, listed for liver transplant with meld 23 who underwent live transplant on 10/5/18.  Back to OR on 10/6/18 for ex lap.  Admitted 10/1/18 for hyponatremia s/p paracentesis.  Personal has known diagnosis of diabetes, endocrine consulted for diabetes management.    Post-operative course complicated by ESBL Klebsiella pneumoniae in urine (in contact isolation), peritransplant ascites, worsening renal function (required temporary dialysis), anemia (multiple blood transfusions), and possible GI bleed.     Interval HPI:   Overnight events: Remains in TSU. BG at or slightly below goal overnight.  Steroid taper switched from prednisone to hydrocortisone 25 mg q 8.  Creatinine remains elevated at 2.3.  Eatin%  Nausea: No  Hypoglycemia and intervention: No  Fever: No  TPN and/or TF: No    /78   Pulse 94   Temp 97.7 °F (36.5 °C) (Axillary)   Resp 14   Ht 5' 3" (1.6 m)   Wt 73.4 kg (161 lb 13.1 oz)   LMP  (LMP Unknown)   SpO2 99%   Breastfeeding? No   BMI 28.66 kg/m²      Labs Reviewed and Include    Recent Labs   Lab 10/30/18  0600      CALCIUM 8.7   ALBUMIN 2.5* "   PROT 4.2*   *   K 3.5   CO2 26   CL 94*   *   CREATININE 2.3*   ALKPHOS 123   ALT 17   AST 23   BILITOT 4.2*     Lab Results   Component Value Date    WBC 3.18 (L) 10/30/2018    HGB 8.4 (L) 10/30/2018    HCT 25.1 (L) 10/30/2018    MCV 90 10/30/2018    PLT 99 (L) 10/30/2018     No results for input(s): TSH, FREET4 in the last 168 hours.  Lab Results   Component Value Date    HGBA1C 6.0 (H) 10/04/2018       Nutritional status:   Body mass index is 28.66 kg/m².  Lab Results   Component Value Date    ALBUMIN 2.5 (L) 10/30/2018    ALBUMIN 2.6 (L) 10/29/2018    ALBUMIN 2.6 (L) 10/28/2018     Lab Results   Component Value Date    PREALBUMIN 12 (L) 09/17/2018       Estimated Creatinine Clearance: 22.2 mL/min (A) (based on SCr of 2.3 mg/dL (H)).    Accu-Checks  Recent Labs     10/27/18  2322 10/28/18  0809 10/28/18  1325 10/28/18  1806 10/28/18  2112 10/29/18  0838 10/29/18  1418 10/29/18  1956 10/30/18  0025 10/30/18  0807   POCTGLUCOSE 215* 182* 267* 210* 194* 161* 212* 126* 141* 105       Current Medications and/or Treatments Impacting Glycemic Control  Immunotherapy:    Immunosuppressants         Stop Route Frequency     mycophenolate capsule 1,000 mg      -- Oral 2 times daily        Steroids:   Hormones (From admission, onward)    Start     Stop Route Frequency Ordered    10/29/18 1400  hydrocortisone sodium succinate injection 25 mg      -- IV Every 8 hours 10/29/18 1047    10/17/18 0945  predniSONE tablet 15 mg      10/24 0859 Oral Daily 10/17/18 0943        Pressors:    Autonomic Drugs (From admission, onward)    Start     Stop Route Frequency Ordered    10/10/18 1500  midodrine tablet 15 mg      -- Oral 3 times daily 10/10/18 1122    10/06/18 1617  rocuronium (ZEMURON) 10 mg/mL injection     Comments:  Created by cabinet override    10/07 0421   10/06/18 1617        Hyperglycemia/Diabetes Medications:   Antihyperglycemics (From admission, onward)    Start     Stop Route Frequency Ordered    10/30/18  1130  insulin aspart U-100 pen 12 Units      -- SubQ 3 times daily with meals 10/30/18 0823    10/14/18 0925  insulin aspart U-100 pen 0-5 Units      -- SubQ Before meals & nightly PRN 10/14/18 0825    10/07/18 1200  insulin regular (Humulin R) 100 Units in sodium chloride 0.9% 100 mL infusion      10/13 2100 IV Continuous 10/07/18 1055          ASSESSMENT and PLAN    * Liver transplanted    Managed per primary team  Avoid hypoglycemia    Recent Labs   Lab 10/29/18  0530   ALT 18   AST 21   ALKPHOS 114   BILITOT 3.0*   PROT 4.3*   ALBUMIN 2.6*            Type 2 diabetes mellitus with diabetic polyneuropathy, with long-term current use of insulin    BG goal 140-180.     Decrease novolog to 12 units with meals  Low dose correction scale given kidney function.  BG monitoring AC/HS    Discharge planning:  TBD     Severe malnutrition    Oral intake encouraged, may affect BG readings     Corticosteroids adverse reaction    On steroid taper per transplant team; may elevate BG readings         Prophylactic immunotherapy    May increase insulin resistance.        EVA (acute kidney injury)    Avoid insulin stacking and hypoglycemia.  Lab Results   Component Value Date    CREATININE 2.3 (H) 10/29/2018            Be Willams NP  Endocrinology  [facility]

## 2018-10-30 NOTE — NURSING
Pt c/o difficulty breathing possibly due to IV hydrocortisone, pt had same reaction when last dose was administered. Notified Jihan PERKINS pt on 2L NC, pt stated oxygen helps with the breathing. Will continue to monitor.

## 2018-10-30 NOTE — ASSESSMENT & PLAN NOTE
- Creatinine on arrival 0.8 and trended up.  - BUN/cr (10/11/18): 91/1.9.   - Tacrolimus 26.9 (10/08).  - Nephrology consulted.  - Renal US (10/11): no hydronephrosis.  - Echo 10/12: diastolic dysfunction, likely 2/2 to ARF.  - pt on renal diet w 1500 ml FR.  - CRRT 10/14; SLED 10/17, 10/20. --> no improvements noted in kidney fxn.  - today  -24 hr urine creatine collection pending

## 2018-10-30 NOTE — PROGRESS NOTES
" [facility]  Adult Nutrition  Progress Note     SUMMARY       Recommendations  Recommendation/Intervention:   1. Encourage increased PO intake and OS intake as tolerated   2. If TPN desired,  Recommend -95g AA, 280g Dextrose              - provides 95gm protein, 1332kcal +Lipds (GIR 2.5)   3. If TF desired, Recommend               -Novasource Renal at goal rate of 40mL/hr- provides 1920kcal, 87gm protein and 688mL free fluid.              -Glucerna 1.5 at goal rate of 55mL/hr - provides 1980kcal, 109gm and 1002mL free fluid.   4. Dietitian Following    Goals:   1. Patient to meet >85% EEN/EPN.   2. 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 Language barrier. Increased po intake 50%. Consuming oral supplements. Receiving HD. NFPE completed 10/3/18.  Nutrition Discharge Planning: Adequate po intake    Nutrition Risk Screen  Nutrition Risk Screen: cultural or Adventist food preferences    Nutrition/Diet History  Patient Reported Diet/Restrictions/Preferences: diabetic diet, low salt(Cultural preferences)  Food Preferences: Cultural preferences Noted  Do you have any cultural, spiritual, Adventist conflicts, given your current situation?: none noted   Supplemental Drinks or Food Habits: Boost Plus, Ensure Plus(Strawberry)  Food Allergies: NKFA  Factors Affecting Nutritional Intake: abdominal distention, decreased appetite    Anthropometrics  Temp: 97.6 °F (36.4 °C)  Height Method: Stated  Height: 5' 3" (160 cm)  Height (inches): 63 in  Weight Method: Bed Scale  Weight: 73.4 kg (161 lb 13.1 oz)  Weight (lb): 161.82 lb  Ideal Body Weight " (IBW), Female: 115 lb  % Ideal Body Weight, Female (lb): 101.8 lb  BMI (Calculated): 20.8  BMI Grade: 18.5-24.9 - normal  Weight Loss: unintentional  Weight Loss Since Admission: 16 lb 0.4 oz     Lab/Procedures/Meds  Labs: Reviewed    (L) 10/30/2018    K 3.5 10/30/2018     (H) 10/30/2018    CREATININE 2.3 (H) 10/30/2018    CALCIUM 8.7 10/30/2018    PHOS 4.2 10/30/2018    MG 1.8 10/30/2018    ALBUMIN 2.5 (L) 10/30/2018      ALT 17 10/30/2018    AST 23 10/30/2018    ALKPHOS 123 10/30/2018     Meds: Reviewed  Scheduled Meds:   amoxicillin  500 mg Oral Q12H    docusate sodium  100 mg Oral Daily    entecavir  0.5 mg Oral Q72H    fluconazole  200 mg Oral Daily    hydrocortisone sodium succinate  25 mg Intravenous Q8H    insulin aspart U-100  12 Units Subcutaneous TIDWM    levoFLOXacin  250 mg Oral Daily    magnesium oxide  800 mg Oral BID    midodrine  15 mg Oral TID    mirtazapine  7.5 mg Oral QHS    mycophenolate  1,000 mg Oral BID    pantoprazole  40 mg Intravenous BID    polyethylene glycol  17 g Oral Daily    sulfamethoxazole-trimethoprim 400-80mg  1 tablet Oral Every Mon, Wed, Fri    traZODone  25 mg Oral QHS    ursodiol  300 mg Oral BID    valGANciclovir  450 mg Oral Twice Weekly     Physical Findings/Assessment  Overall Physical Appearance: loss of subcutaneous fat, loss of muscle mass  Oral/Mouth Cavity: tooth/teeth missing  Skin: intact, edema, jaundice    Estimated/Assessed Needs  Weight Used For Calorie Calculations: 72.2 kg (159 lb 2.8 oz)  Energy Calorie Requirements (kcal): 8596-8155  Energy Need Method: Kcal/kg(25-30 kcal/kg)  Protein Requirements: (1.2-1.4 gm/kg)  Weight Used For Protein Calculations: 72.2 kg (159 lb 2.8 oz)  Fluid Requirements (mL): 1 mL/kcal or per MD  Fluid Need Method: RDA Method  RDA Method (mL): 1805     Nutrition Prescription Ordered  Current Diet Order: Regular,Low Chol/Sat Fat, Low Potassium, Renal  Oral Nutrition Supplement: Boost Glucose  Control    Evaluation of Received Nutrient/Fluid Intake in last 24h  I/O: -11.5L since 10/16/18  Comments: LBM 10/30/18  % Intake of Estimated Energy Needs: 50 - 75 %  % Meal Intake: 50 - 75 %    Nutrition Risk  Level of Risk/Frequency of Follow-up: low(1x week)     Assessment and Plan  NFPE completed 10/3/18      Severe malnutrition     Nutrition Problem  Severe Malnutrition in the context of Chronic Illness/Injury     Related to (etiology):  ESLD     Signs and Symptoms (as evidenced by):  Energy Intake: <75% of estimated energy requirement for 2 months  Body Fat Depletion: Severe depletion of orbitals and triceps   Muscle Mass Depletion: Severe depletion of temples, clavicle region and lower extremities   Weight Loss: 12.31% x 1 month     Nutrition Diagnosis Status:  Continues     Monitor and Evaluation  Food and Nutrient Intake: energy intake, food and beverage intake  Food and Nutrient Adminstration: diet order  Knowledge/Beliefs/Attitudes: food and nutrition knowledge/skill  Physical Activity and Function: nutrition-related ADLs and IADLs  Anthropometric Measurements: weight change, weight  Biochemical Data, Medical Tests and Procedures: electrolyte and renal panel, gastrointestinal profile, glucose/endocrine profile, inflammatory profile, lipid profile  Nutrition-Focused Physical Findings: overall appearance     Nutrition Follow-Up  RD Follow-up?: Yes

## 2018-10-30 NOTE — PROGRESS NOTES
[facility]  Liver Transplant  Progress Note    Patient Name: Scarlett Reddy  MRN: [id number]  Admission Date: 10/1/2018  Hospital Length of Stay: 28 days  Code Status: Full Code  Primary Care Provider: Primary Doctor No  Post-Operative Day: 25    ORGAN:   LIVER  Disease Etiology: Cirrhosis: Type B and D  Donor Type:    - Brain Death  CDC High Risk:   No  Donor CMV Status:   Donor CMV Status: Positive  Donor HBcAB:   Negative  Donor HCV Status:   Negative  Donor HBV SETH: Negative  Donor HCV SETH: Negative  Whole or Partial: Whole Liver  Biliary Anastomosis: End to End  Arterial Anatomy: Replaced Left Hepatic and Right Hepatic  Subjective:     History of Present Illness:  Ms. Reddy is a 67 y/o female with PMH of ESLD secondary Hep B and D.  Listed for liver transplant with MELD 23.  Paracentesis 10/1 with 5L removed, no fluid sent for analysis.  Morning labs significant for hyponatremia, Na 119.  Pt admitted to LTS for sodium monitoring.        Hospital Course:  Hospital Course: 67 y/o F h/o HBV/delta cirrhosis s/p DBDLT 10/5/18 (CMV D+/R+, steroid induction, MMF/tacro/pred maintenance) with take back on 10/6 2/2 hyperbilirubinema and bilious VALORIE drainage, no leak identified. Post-op course c/b hypercapneic and hypoxic respiratory failure and was reintubated though quickly extubated now doing well. Liver bx (10/6) sig for Zone 3 hemorrhage and collapse, in addition to cholestasis. Transferred from ICU on POD #4. ID consulted to comment on positive diphtheroid culture from ascitic fluid (likely skin colonization) as well as ESBL Klebsiella pneumoniae in urine culture (10/4).  Pt asymptomatic and cell count from ascitic fluid unremarkable. Per ID recs, no need to treat diphtheroids or asymptomatic ESBL Kleb pneumo bacteriuria though pt has had 6 days of therapy which would cover these organisms. Mild peritransplant ascites- repeat US 10/6 with increased arterial resistive indices. Repeat US 10/9  w/ continued elevation in RIs and fluid collections. LFTs trending down nicely.     Acute gradual worsening of renal function s/p OLTx.Creatinine on arrival 0.8 and has been up trending. Nephrology consulted for post-op EVA. Cr cont to trend up but remains to have good UOP. Had stable response to lasiz diuresis.  BUN elevated requiring multiple rounds of dialysis (10/15, 10/17, 10/20). No significant improvements in kidney function noted. In addition, pt H/H cont to fluctuate requiring multiple blood transfusions (10/14, 10/16, 10/18, 10/19, 10/22, 10/27, 10/29).  Pt reported dark stools 10/15 but color has normalized. FOBT+. EGD 10/18 consistent with ulcerated mucosa in duodenum s/p coagulation. Biopsy pending. Concern for H. Pylori. Started Amoxicillin/levofloxacin (10/19). Remains on Protonix 40 mg BID. Will cont close monitoring H/H, transfuse as needed with goal Hb > 7.0.  Of note, Urine cx + Klebsiella pneumoniae (10/13). Received 3 day course of ciprofloxacin, dc'd 10/19.   On 10/19 pm pt c/o crushing chest pain. Troponin 0.094, likely 2/2 ischemic stress. EKG NSR.     Interval history: Pt feeling fatigued this morning. Cr/BUN remain elevated. 24 hour urine collection pending- awaiting results to determine further dialysis consideration. Pt c/o SOB. CXR pending. H&H responded appropriately to blood transfusion yesterday. Pt denies melena. Dc'd prednisone and prograf 10/29. Remains on hydrocortisone 25mg q8. Pt eating well, +BM, ambulating. Cont to hold heparin for possible ongoing GI bleed. Monitor.    Scheduled Meds:   amoxicillin  500 mg Oral Q12H    docusate sodium  100 mg Oral Daily    entecavir  0.5 mg Oral Q72H    fluconazole  200 mg Oral Daily    hydrocortisone sodium succinate  25 mg Intravenous Q8H    insulin aspart U-100  12 Units Subcutaneous TIDWM    levoFLOXacin  250 mg Oral Daily    magnesium oxide  800 mg Oral BID    midodrine  15 mg Oral TID    mirtazapine  7.5 mg Oral QHS     mycophenolate  1,000 mg Oral BID    pantoprazole  40 mg Intravenous BID    polyethylene glycol  17 g Oral Daily    sulfamethoxazole-trimethoprim 400-80mg  1 tablet Oral Every Mon, Wed, Fri    traZODone  25 mg Oral QHS    ursodiol  300 mg Oral BID    valGANciclovir  450 mg Oral Twice Weekly     Continuous Infusions:  PRN Meds:bisacodyl, bisacodyl, calcium carbonate, dextrose 50%, dextrose 50%, glucagon (human recombinant), glucose, glucose, insulin aspart U-100, ondansetron, ondansetron, oxyCODONE, oxyCODONE, polyethylene glycol, promethazine (PHENERGAN) IVPB, sodium chloride 0.9%    Review of Systems   Constitutional: Positive for activity change and fatigue. Negative for chills and fever.   Respiratory: Positive for shortness of breath. Negative for cough.    Cardiovascular: Positive for leg swelling. Negative for chest pain.   Gastrointestinal: Positive for abdominal distention and abdominal pain. Negative for constipation, diarrhea, nausea and vomiting.   Genitourinary: Negative for decreased urine volume, difficulty urinating and dysuria.   Musculoskeletal: Negative for arthralgias.   Allergic/Immunologic: Positive for immunocompromised state.   Neurological: Positive for weakness. Negative for tremors and headaches.     Objective:     Vital Signs (Most Recent):  Temp: 97.9 °F (36.6 °C) (10/30/18 0815)  Pulse: 105 (10/30/18 1100)  Resp: 18 (10/30/18 0815)  BP: (!) 101/51 (10/30/18 1030)  SpO2: 98 % (10/30/18 1100) Vital Signs (24h Range):  Temp:  [97.4 °F (36.3 °C)-97.9 °F (36.6 °C)] 97.9 °F (36.6 °C)  Pulse:  [] 105  Resp:  [11-23] 18  SpO2:  [95 %-100 %] 98 %  BP: (101-147)/(51-83) 101/51     Weight: 73.4 kg (161 lb 13.1 oz)  Body mass index is 28.66 kg/m².    Intake/Output - Last 3 Shifts       10/28 0700 - 10/29 0659 10/29 0700 - 10/30 0659 10/30 0700 - 10/31 0659    P.O. 625 1330     Blood  250     Total Intake(mL/kg) 625 (8.5) 1580 (21.5)     Urine (mL/kg/hr) 850 (0.5) 1100 (0.6)     Emesis/NG  output 0      Other 0      Stool 0 0     Blood 0      Total Output 850 1100     Net -225 +480            Urine Occurrence 0 x      Stool Occurrence 2 x 2 x     Emesis Occurrence 0 x            Physical Exam   Constitutional: She is oriented to person, place, and time.   Temporal and distal extremity muscle wasting   Eyes: EOM are normal. Pupils are equal, round, and reactive to light. Scleral icterus is present.   Neck: Normal range of motion.   Cardiovascular: Normal rate and regular rhythm.   No murmur heard.  Pulmonary/Chest: Effort normal and breath sounds normal. No respiratory distress. She has no wheezes.   Abdominal: Soft. Bowel sounds are normal. She exhibits distension. There is tenderness. There is no guarding.   Chevron incision w/ staples, CDI   Musculoskeletal: Normal range of motion. She exhibits edema (3+ BLE).   Neurological: She is alert and oriented to person, place, and time.   Skin: Skin is warm and dry.   Nursing note and vitals reviewed.      Laboratory:  Immunosuppressants         Stop Route Frequency     mycophenolate capsule 1,000 mg      -- Oral 2 times daily        CBC:   Recent Labs   Lab 10/30/18  0600   WBC 3.18*   RBC 2.79*   HGB 8.4*   HCT 25.1*   PLT 99*   MCV 90   MCH 30.1   MCHC 33.5     CMP:   Recent Labs   Lab 10/30/18  0600      CALCIUM 8.7   ALBUMIN 2.5*   PROT 4.2*   *   K 3.5   CO2 26   CL 94*   *   CREATININE 2.3*   ALKPHOS 123   ALT 17   AST 23   BILITOT 4.2*     Labs within the past 24 hours have been reviewed.    Diagnostic Results:  I have personally reviewed all pertinent imaging studies.    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   - POD 1 US: increased arterial resistive indices, suggestive of edema vs cute rejection vs congestion.  - Repeat US 10/9 with continued  elevation of RIs.  - LFTs stable.  - T bili trending down.  - repeat liver US (10/11): elevated RIs.           Azotemia           Duodenal ulcer    - EGD 10/18- sig for ulcerated duodenal mucosa.   - Biopsy pending from 10/18.  - Cont protonix 40 BID.  - Started H. Pylori tx 10/19: Amoxicillin/Levo. Treat x 14 days, end 11/2.       UTI (urinary tract infection)    - Urine Cx 10/15 w/ Klebsiella pneumonia.  - Started Ciprofloxacin 10/17. Dc'd 10/19. Coverage w/ triple therapy for h. Pylori.  - Pt asymptomatic at this time.       Acute blood loss anemia    - H&H cont to fluctuate.   - FOBT +  - EGD 10/18- showed diffuse bleeding with ulcerated duodenal mucosa   - Monitoring w/ CBC q 12 to assess the need for transfusion.  - 1 unit PRBCs transfused 10/27. H&H stable today.            Post LT - Acute renal failure    - Creatinine on arrival 0.8 and trended up.  - BUN/cr (10/11/18): 91/1.9.   - Tacrolimus 26.9 (10/08).  - Nephrology consulted.  - Renal US (10/11): no hydronephrosis.  - Echo 10/12: diastolic dysfunction, likely 2/2 to ARF.  - pt on renal diet w 1500 ml FR.  - CRRT 10/14; SLED 10/17, 10/20. --> no improvements noted in kidney fxn.  - today  -24 hr urine creatine collection pending     Hypervolemia associated with renal insufficiency    - Diuresed w/ Lasix 100mg x 1 (10/12).  - CRRT 10/14.  .    - SLED 10/17, 10/20.  - Diuresed w/ Lasix 80 BID-- stopped (10/23).  - Will assess the need for diuresis on a daily basis.  - Lasix 40mg IV x 2, 10/27 & 10/28.  -CXR today       EVA (acute kidney injury)           Metabolic acidosis    - CRRT 10/14.  - SLED 10/17, 10/20.         At risk for opportunistic infections    - Valcyte for CMV prophylaxis.  - Bactrim for PCP prophylaxis (MWF).  - Fluconazole for fungal prophylaxis.        Long-term use of immunosuppressant medication    -  Cellcept/Prograf/pred.-- stopped prednisone and prograf today (10/29). Will resume w/ improvements in kidney  function  -started on hydrocortisone (10/29)   -  Will monitor for signs of toxic side effects, check daily tacrolimus troughs, and change meds accordingly.       Prophylactic immunotherapy    - See long term use immunosuppresion.       Hypokalemia    - Kdur 40 meq x 2 doses.       Ascites    - paracentesis 10/1, 5L removed, no fluid sent for analysis.  - lasix 40 mg IV x 2 doses.          Weakness    - see physical decond.       Physical deconditioning    - PT/OT consulted.  - Recommend home health PT and rolling walker at discharge.       Hyponatremia    - Na 119 on admit.  - Na was improving post transplant w HD.  - Na stable at this time.             Hypotension    - continue Midodrine 15 mg TID. Monitor.       Anemia requiring transfusions    - Fluctuating H/H.    - Transfuse 10/14; 10/16; 10/18; 10/19; 10/22; 10/27; 10/29  - Decreased platelets -- 1 u plt given 10/18.  - DDAVP given 10/18 and 10/19.  - FOBT +.  - LDH elevated, Hapto < 10.  - EGD 10/18: ulcerated duodenal mucosa.   - Monitoring w/ CBC q12hr to assess the need for transfusion.  - Transfused 1u yesterday--responded appropriately. Monitor       Severe malnutrition    - dietary consulted.   - temporal and distal extremity muscle wasting and edema.  - encourage supplements and to increase nutritional intake .  - pt on renal/low K+ diet.     Type 2 diabetes mellitus with diabetic polyneuropathy, with long-term current use of insulin    - continue home regimen.  - endocrine consulted.  - Pt off insulin drip at this time.  Apprec endo recs.        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  -Tx w/ Entecavir- dose changed to q72 hours given renal function.             VTE Risk Mitigation (From admission, onward)        Ordered     Place sequential compression device  Until discontinued      10/05/18 0709          The patients clinical status was discussed at multidisplinary rounds, involving transplant surgery,  transplant medicine, pharmacy, nursing, nutrition, and social work    Discharge Planning:  No Patient Care Coordination Note on file.      Jihan Soares PA-C  Liver Transplant  [facility]

## 2018-10-30 NOTE — PT/OT/SLP PROGRESS
Physical Therapy Treatment    Patient Name:  Scarlett Reddy   MRN:  [id number]    Recommendations:     Discharge Recommendations:  home health PT, home health OT   Discharge Equipment Recommendations: walker, rolling   Barriers to discharge: Decreased caregiver support    Assessment:     Scarlett Reddy is a 68 y.o. female admitted with a medical diagnosis of Liver transplanted.  She presents with the following impairments/functional limitations:  weakness, impaired functional mobilty, gait instability, impaired endurance, impaired balance, impaired self care skills, decreased safety awareness, impaired cardiopulmonary response to activity, decreased lower extremity function.  Pt tolerated session c min c/o fatigue and BLE weakness.  Session focused mainly on sit<>stand transfer as pt having difficulty transferring from lower surfaces (bedside chair and toilet).  Performed transfer c mod A and min A following education; gait c CGA using RW.  Pt continued to require cues for proper hand placements when performing stand>sit to improve safety.  Demo fair unsupported standing balance for short periods of time requiring SBA.  Pt safe to ambulate in room c assistance of 1 person and RW.  Pt would benefit from continued skilled acute PT 4x/wk to improve functional mobility.      Rehab Prognosis:  good; patient would benefit from acute skilled PT services to address these deficits and reach maximum level of function.      Recent Surgery: Procedure(s) (LRB):  EGD (ESOPHAGOGASTRODUODENOSCOPY) (N/A) 12 Days Post-Op    Plan:     During this hospitalization, patient to be seen 4 x/week to address the above listed problems via gait training, therapeutic activities, therapeutic exercises, neuromuscular re-education  · Plan of Care Expires:  11/05/18   Plan of Care Reviewed with: patient, spouse, other (see comments)()    Subjective     Communicated with RN and OT prior to session.  Patient found [facility]IC ( and   "present) upon PT entry to room, agreeable to treatment.      Chief Complaint: fatigue; BLE weakness  Patient comments/goals: "I need to use the bathroom."  Pain/Comfort:  · Pain Rating 1: (c/o abdominal  discomfort but did not rate)    Patients cultural, spiritual, Shinto conflicts given the current situation: none    Objective:     Patient found with: pulse ox (continuous), telemetry     General Precautions: Standard, fall, contact   Orthopedic Precautions:N/A   Braces: N/A     Functional Mobility:  · Transfers:     · Sit to Stand:  moderate assistance with rolling walker  · 1x from bedside chair and 1x from toilet  · Toilet Transfer: contact guard assistance with  rolling walker  using  Step Transfer  · Gait: 15ftx2, 30ft c RW CGA  · Decreased gait speed, mild unsteadiness c turning, c/o fatigue  · Balance: sitting (S); standing (SBA)      AM-PAC 6 CLICK MOBILITY  Turning over in bed (including adjusting bedclothes, sheets and blankets)?: 3  Sitting down on and standing up from a chair with arms (e.g., wheelchair, bedside commode, etc.): 3  Moving from lying on back to sitting on the side of the bed?: 3  Moving to and from a bed to a chair (including a wheelchair)?: 3  Need to walk in hospital room?: 3  Climbing 3-5 steps with a railing?: 2  Basic Mobility Total Score: 17       Therapeutic Activities and Exercises:  Pt educated on: progress; safety c mobility; benefits of OOB activities; performing therex; proper hand placement for sit<>stand transfer - v/u  Transferred to restroom for voiding - required assistance for hygiene/standing  Sit<>stand x15 from bedside chair   -Extensive education on hand placement, scooting forward in chair, leaning trunk forward to assist in clearing bottom, reaching back for chair prior to sitting   -able to perform transfer c min A following education but still requires continued education on reaching back for sitting surface prior to sitting  Static standing ~30sec each " stand  Therex (AP, LAQ, hip flex, reclined sit ups in chair) 2x10 ea        Patient left up in chair with all lines intact, call button in reach, RN notified and / present..    GOALS:   Multidisciplinary Problems     Physical Therapy Goals        Problem: Physical Therapy Goal    Goal Priority Disciplines Outcome Goal Variances Interventions   Physical Therapy Goal     PT, PT/OT Ongoing (interventions implemented as appropriate)     Description:  Goals to be met by: 2018    Patient will increase functional independence with mobility by performin. Supine to sit with Contact Guard Assistance - not met  2. Sit to stand transfer with Supervision using LRAD or no AD - not met  3. Bed to chair transfer with Supervision using LRAD or no AD - not met  4. Gait  x 250 feet with Supervision using LRAD or no AD - not met  5. Stand for 3 minutes with Supervision to increase activity tolerance - not met   6. Lower extremity exercise program x15 reps per handout, with independence - not met                        Time Tracking:     PT Received On: 10/30/18  PT Start Time: 1022     PT Stop Time: 1101  PT Total Time (min): 39 min     Billable Minutes: Therapeutic Activity 25 min and Therapeutic Exercise 13 min    Treatment Type: Treatment  PT/PTA: PT     PTA Visit Number: 0     Marcelino Bird, PT  10/30/2018

## 2018-10-30 NOTE — PLAN OF CARE
Problem: Physical Therapy Goal  Goal: Physical Therapy Goal  Goals to be met by: 2018    Patient will increase functional independence with mobility by performin. Supine to sit with Contact Guard Assistance - not met  2. Sit to stand transfer with Supervision using LRAD or no AD - not met  3. Bed to chair transfer with Supervision using LRAD or no AD - not met  4. Gait  x 250 feet with Supervision using LRAD or no AD - not met  5. Stand for 3 minutes with Supervision to increase activity tolerance - not met   6. Lower extremity exercise program x15 reps per handout, with independence - not met       Outcome: Ongoing (interventions implemented as appropriate)  Progressing well towards goals    Marcelino Bird DPT  10/30/2018

## 2018-10-30 NOTE — SUBJECTIVE & OBJECTIVE
Interval History: Patient is felling a bit tired with her steroid dosing. She was able to collect the 24 hour urine, She remains to have good urine output and does not have any uremic symptoms.     Review of patient's allergies indicates:  No Known Allergies  Current Facility-Administered Medications   Medication Frequency    amoxicillin capsule 500 mg Q12H    bisacodyl EC tablet 10 mg Daily PRN    bisacodyl suppository 10 mg Daily PRN    calcium carbonate 200 mg calcium (500 mg) chewable tablet 500 mg TID PRN    dextrose 50% injection 12.5 g PRN    dextrose 50% injection 25 g PRN    docusate sodium capsule 100 mg Daily    entecavir tablet 0.5 mg Q72H    fluconazole tablet 200 mg Daily    glucagon (human recombinant) injection 1 mg PRN    glucose chewable tablet 16 g PRN    glucose chewable tablet 24 g PRN    hydrocortisone sodium succinate injection 25 mg Q8H    insulin aspart U-100 pen 0-5 Units QID (AC + HS) PRN    insulin aspart U-100 pen 12 Units TIDWM    levoFLOXacin tablet 250 mg Daily    magnesium oxide tablet 800 mg BID    midodrine tablet 15 mg TID    mirtazapine tablet 7.5 mg QHS    mycophenolate capsule 1,000 mg BID    ondansetron disintegrating tablet 8 mg Q8H PRN    ondansetron injection 4 mg Q8H PRN    oxyCODONE immediate release tablet 5 mg Q4H PRN    oxyCODONE immediate release tablet Tab 10 mg Q4H PRN    pantoprazole injection 40 mg BID    polyethylene glycol packet 17 g Daily PRN    polyethylene glycol packet 17 g Daily    promethazine (PHENERGAN) 6.25 mg in dextrose 5 % 50 mL IVPB Q6H PRN    sodium chloride 0.9% flush 3 mL PRN    sulfamethoxazole-trimethoprim 400-80mg per tablet 1 tablet Every Mon, Wed, Fri    trazodone split tablet 25 mg QHS    ursodiol capsule 300 mg BID    valGANciclovir tablet 450 mg Twice Weekly       Objective:     Vital Signs (Most Recent):  Temp: 97.6 °F (36.4 °C) (10/30/18 1209)  Pulse: 98 (10/30/18 1129)  Resp: 18 (10/30/18 0815)  BP:  (!) 101/51 (10/30/18 1030)  SpO2: 98 % (10/30/18 1100)  O2 Device (Oxygen Therapy): room air (10/30/18 1100) Vital Signs (24h Range):  Temp:  [97.4 °F (36.3 °C)-97.9 °F (36.6 °C)] 97.6 °F (36.4 °C)  Pulse:  [] 98  Resp:  [11-20] 18  SpO2:  [95 %-100 %] 98 %  BP: (101-147)/(51-83) 101/51     Weight: 73.4 kg (161 lb 13.1 oz) (10/28/18 0500)  Body mass index is 28.66 kg/m².  Body surface area is 1.81 meters squared.    I/O last 3 completed shifts:  In: 1580 [P.O.:1330; Blood:250]  Out: 1400 [Urine:1400]    Physical Exam   Constitutional: She is oriented to person, place, and time.   Temporal and distal extremity muscle wasting   Eyes: EOM are normal. Pupils are equal, round, and reactive to light. Scleral icterus is present.   Neck: Normal range of motion.   Cardiovascular: Normal rate and regular rhythm.   No murmur heard.  Pulmonary/Chest: Effort normal and breath sounds normal. No respiratory distress. She has no wheezes.   Abdominal: Soft. Bowel sounds are normal. She exhibits distension. There is tenderness. There is no guarding.   Chevron incision w/ staples, CDI   Musculoskeletal: Normal range of motion. She exhibits edema (3+ BLE).   Neurological: She is alert and oriented to person, place, and time.   Skin: Skin is warm and dry.   Nursing note and vitals reviewed.      Significant Labs:  CBC:   Recent Labs   Lab 10/30/18  0600   WBC 3.18*   RBC 2.79*   HGB 8.4*   HCT 25.1*   PLT 99*   MCV 90   MCH 30.1   MCHC 33.5     CMP:   Recent Labs   Lab 10/30/18  0600      CALCIUM 8.7   ALBUMIN 2.5*   PROT 4.2*   *   K 3.5   CO2 26   CL 94*   *   CREATININE 2.3*   ALKPHOS 123   ALT 17   AST 23   BILITOT 4.2*     All labs within the past 24 hours have been reviewed.     Significant Imaging:  Labs: Reviewed

## 2018-10-30 NOTE — SUBJECTIVE & OBJECTIVE
"Interval HPI:   Overnight events: Remains in TSU. BG at or slightly below goal overnight.  Steroid taper switched from prednisone to hydrocortisone 25 mg q 8.  Creatinine remains elevated at 2.3.  Eatin%  Nausea: No  Hypoglycemia and intervention: No  Fever: No  TPN and/or TF: No    /78   Pulse 94   Temp 97.7 °F (36.5 °C) (Axillary)   Resp 14   Ht 5' 3" (1.6 m)   Wt 73.4 kg (161 lb 13.1 oz)   LMP  (LMP Unknown)   SpO2 99%   Breastfeeding? No   BMI 28.66 kg/m²     Labs Reviewed and Include    Recent Labs   Lab 10/30/18  0600      CALCIUM 8.7   ALBUMIN 2.5*   PROT 4.2*   *   K 3.5   CO2 26   CL 94*   *   CREATININE 2.3*   ALKPHOS 123   ALT 17   AST 23   BILITOT 4.2*     Lab Results   Component Value Date    WBC 3.18 (L) 10/30/2018    HGB 8.4 (L) 10/30/2018    HCT 25.1 (L) 10/30/2018    MCV 90 10/30/2018    PLT 99 (L) 10/30/2018     No results for input(s): TSH, FREET4 in the last 168 hours.  Lab Results   Component Value Date    HGBA1C 6.0 (H) 10/04/2018       Nutritional status:   Body mass index is 28.66 kg/m².  Lab Results   Component Value Date    ALBUMIN 2.5 (L) 10/30/2018    ALBUMIN 2.6 (L) 10/29/2018    ALBUMIN 2.6 (L) 10/28/2018     Lab Results   Component Value Date    PREALBUMIN 12 (L) 2018       Estimated Creatinine Clearance: 22.2 mL/min (A) (based on SCr of 2.3 mg/dL (H)).    Accu-Checks  Recent Labs     10/27/18  2322 10/28/18  0809 10/28/18  1325 10/28/18  1806 10/28/18  2112 10/29/18  0838 10/29/18  1418 10/29/18  1956 10/30/18  0025 10/30/18  0807   POCTGLUCOSE 215* 182* 267* 210* 194* 161* 212* 126* 141* 105       Current Medications and/or Treatments Impacting Glycemic Control  Immunotherapy:    Immunosuppressants         Stop Route Frequency     mycophenolate capsule 1,000 mg      -- Oral 2 times daily        Steroids:   Hormones (From admission, onward)    Start     Stop Route Frequency Ordered    10/29/18 1400  hydrocortisone sodium succinate " injection 25 mg      -- IV Every 8 hours 10/29/18 1047    10/17/18 0945  predniSONE tablet 15 mg      10/24 0859 Oral Daily 10/17/18 0943        Pressors:    Autonomic Drugs (From admission, onward)    Start     Stop Route Frequency Ordered    10/10/18 1500  midodrine tablet 15 mg      -- Oral 3 times daily 10/10/18 1122    10/06/18 1617  rocuronium (ZEMURON) 10 mg/mL injection     Comments:  Created by cabinet override    10/07 0429   10/06/18 1617        Hyperglycemia/Diabetes Medications:   Antihyperglycemics (From admission, onward)    Start     Stop Route Frequency Ordered    10/30/18 1130  insulin aspart U-100 pen 12 Units      -- SubQ 3 times daily with meals 10/30/18 0823    10/14/18 0925  insulin aspart U-100 pen 0-5 Units      -- SubQ Before meals & nightly PRN 10/14/18 0825    10/07/18 1200  insulin regular (Humulin R) 100 Units in sodium chloride 0.9% 100 mL infusion      10/13 2100 IV Continuous 10/07/18 1056

## 2018-10-30 NOTE — ASSESSMENT & PLAN NOTE
-  Cellcept/Prograf/pred.-- stopped prednisone and prograf today (10/29). Will resume w/ improvements in kidney function  -started on hydrocortisone (10/29)   -  Will monitor for signs of toxic side effects, check daily tacrolimus troughs, and change meds accordingly.

## 2018-10-30 NOTE — PLAN OF CARE
Problem: Patient Care Overview  Goal: Plan of Care Review  Outcome: Ongoing (interventions implemented as appropriate)    Pt AAOx4 with Daughter, Gabriela, at the bedside.  Pt is jaundiced. Sclera yellow. 4+ edema to BLE. Compression stockings on. Abdomen distended.  Cr 2.3 yesterday. Lasix and Prograf held. Pt given hydrocortisone IV Q8h in lieu of Prograf.   Pt on contact iso for ESBL in urine.  24 hour urine collection in process. Started yesterday at 11AM. Ending today at 11AM. 350 mL collected over night.   R IJ TLC CDI. Dressing change due tomorrow.  H/H stable. CBCs Q12h. Pt received 1 unit of PRBCs yesterday for h&h 7.5 & 22.7. Came up to 9.5 & 28.7.  Pt up with walker and 1 to 2 assist.  VSS. Pt on tele- SR with HR 70's-90's. ST when up to bathroom.   Pt with increased SOB. Requesting to be placed on NC despite satting 97% on room air. Satting 99% on 1.5L per NC.   Chevron incision JULIANE with staples CDI. Painted with betadine.  Pt with serous leakage from RLQ- old VALORIE sites. Gauze dressing changed.  Pt also with some serous weeping from BLE.   Accuchecks AC/HS. Pre-dinner . Pt given 14 units of Novolog. HS . No coverage needed.  No acute changes over night.  Pt remained free from falls or injury thus far. Bed is in low/ locked position, side rails are up x 2, call light is in reach. Will continue to monitor.

## 2018-10-30 NOTE — ASSESSMENT & PLAN NOTE
- Diuresed w/ Lasix 100mg x 1 (10/12).  - CRRT 10/14.  .    - SLED 10/17, 10/20.  - Diuresed w/ Lasix 80 BID-- stopped (10/23).  - Will assess the need for diuresis on a daily basis.  - Lasix 40mg IV x 2, 10/27 & 10/28.  -CXR today

## 2018-10-30 NOTE — SUBJECTIVE & OBJECTIVE
Scheduled Meds:   amoxicillin  500 mg Oral Q12H    docusate sodium  100 mg Oral Daily    entecavir  0.5 mg Oral Q72H    fluconazole  200 mg Oral Daily    hydrocortisone sodium succinate  25 mg Intravenous Q8H    insulin aspart U-100  12 Units Subcutaneous TIDWM    levoFLOXacin  250 mg Oral Daily    magnesium oxide  800 mg Oral BID    midodrine  15 mg Oral TID    mirtazapine  7.5 mg Oral QHS    mycophenolate  1,000 mg Oral BID    pantoprazole  40 mg Intravenous BID    polyethylene glycol  17 g Oral Daily    sulfamethoxazole-trimethoprim 400-80mg  1 tablet Oral Every Mon, Wed, Fri    traZODone  25 mg Oral QHS    ursodiol  300 mg Oral BID    valGANciclovir  450 mg Oral Twice Weekly     Continuous Infusions:  PRN Meds:bisacodyl, bisacodyl, calcium carbonate, dextrose 50%, dextrose 50%, glucagon (human recombinant), glucose, glucose, insulin aspart U-100, ondansetron, ondansetron, oxyCODONE, oxyCODONE, polyethylene glycol, promethazine (PHENERGAN) IVPB, sodium chloride 0.9%    Review of Systems   Constitutional: Positive for activity change and fatigue. Negative for chills and fever.   Respiratory: Positive for shortness of breath. Negative for cough.    Cardiovascular: Positive for leg swelling. Negative for chest pain.   Gastrointestinal: Positive for abdominal distention and abdominal pain. Negative for constipation, diarrhea, nausea and vomiting.   Genitourinary: Negative for decreased urine volume, difficulty urinating and dysuria.   Musculoskeletal: Negative for arthralgias.   Allergic/Immunologic: Positive for immunocompromised state.   Neurological: Positive for weakness. Negative for tremors and headaches.     Objective:     Vital Signs (Most Recent):  Temp: 97.9 °F (36.6 °C) (10/30/18 0815)  Pulse: 105 (10/30/18 1100)  Resp: 18 (10/30/18 0815)  BP: (!) 101/51 (10/30/18 1030)  SpO2: 98 % (10/30/18 1100) Vital Signs (24h Range):  Temp:  [97.4 °F (36.3 °C)-97.9 °F (36.6 °C)] 97.9 °F (36.6  °C)  Pulse:  [] 105  Resp:  [11-23] 18  SpO2:  [95 %-100 %] 98 %  BP: (101-147)/(51-83) 101/51     Weight: 73.4 kg (161 lb 13.1 oz)  Body mass index is 28.66 kg/m².    Intake/Output - Last 3 Shifts       10/28 0700 - 10/29 0659 10/29 0700 - 10/30 0659 10/30 0700 - 10/31 0659    P.O. 625 1330     Blood  250     Total Intake(mL/kg) 625 (8.5) 1580 (21.5)     Urine (mL/kg/hr) 850 (0.5) 1100 (0.6)     Emesis/NG output 0      Other 0      Stool 0 0     Blood 0      Total Output 850 1100     Net -225 +480            Urine Occurrence 0 x      Stool Occurrence 2 x 2 x     Emesis Occurrence 0 x            Physical Exam   Constitutional: She is oriented to person, place, and time.   Temporal and distal extremity muscle wasting   Eyes: EOM are normal. Pupils are equal, round, and reactive to light. Scleral icterus is present.   Neck: Normal range of motion.   Cardiovascular: Normal rate and regular rhythm.   No murmur heard.  Pulmonary/Chest: Effort normal and breath sounds normal. No respiratory distress. She has no wheezes.   Abdominal: Soft. Bowel sounds are normal. She exhibits distension. There is tenderness. There is no guarding.   Chevron incision w/ staples, CDI   Musculoskeletal: Normal range of motion. She exhibits edema (3+ BLE).   Neurological: She is alert and oriented to person, place, and time.   Skin: Skin is warm and dry.   Nursing note and vitals reviewed.      Laboratory:  Immunosuppressants         Stop Route Frequency     mycophenolate capsule 1,000 mg      -- Oral 2 times daily        CBC:   Recent Labs   Lab 10/30/18  0600   WBC 3.18*   RBC 2.79*   HGB 8.4*   HCT 25.1*   PLT 99*   MCV 90   MCH 30.1   MCHC 33.5     CMP:   Recent Labs   Lab 10/30/18  0600      CALCIUM 8.7   ALBUMIN 2.5*   PROT 4.2*   *   K 3.5   CO2 26   CL 94*   *   CREATININE 2.3*   ALKPHOS 123   ALT 17   AST 23   BILITOT 4.2*     Labs within the past 24 hours have been reviewed.    Diagnostic Results:  I have  personally reviewed all pertinent imaging studies.

## 2018-10-30 NOTE — PLAN OF CARE
Pt AAOx4, c/o SOB this afternoon, also c/o abd discomfort and leg pain secondary to pitting edema. Pain medication offered but pt refused.  Carmen CARDOZO w staples, family painting incision. VALORIE suture sites leaking, dressing changed twice today. 24hr urine collection sent today. Cr 2.3 this AM, LFTs within normal limit. H/H 8.4 and 25.1 this AM. Family supplies supplemental protein drink, pt drinks twice a day. Contact precautions maintained. On tele, NSR at rest, sinus tach while ambulating.

## 2018-10-30 NOTE — PROGRESS NOTES
[facility]  Nephrology  Progress Note    Patient Name: Scarlett Reddy  MRN: [id number]  Admission Date: 10/1/2018  Hospital Length of Stay: 28 days  Attending Provider: Jani Theodore MD   Primary Care Physician: Primary Doctor No  Principal Problem:Liver transplanted    Subjective:     HPI: 67 y/o female with ESLD due to Hep B and D cirrhosis MELD of 23 on 10/01/18.  Paracentesis 10/1 with 5L removed, no fluid sent for analysis. 10/02 significant for hyponatremia, Na 119 and she was admitted to LTS for sodium monitoring. On 10/05/18 she is s/p DBDLT (CMV D+/R+, steroid induction, MMF/tacro/pred maintenance) with take back on 10/6 secondary to hyperbilirubinema, no leak identified. Post-op course complicated by hypercapneic and hypoxic respiratory failure and was intubated 10/06 and extubated 10/07. Liver bx (10/6) sig for Zone 3 hemorrhage and collapse, in addition to cholestasis. ID consulted to comment on positive diphtheroid culture from ascitic fluid (likely skin colonization) as well as ESBL Klebsiella pneumoniae in urine culture (10/4).  Pt asymptomatic and cell count from ascitic fluid unremarkable. Mild peritransplant ascites- repeat US 10/6 with increased arterial resistive indices. Repeat US 10/9 w/ continued elevation in RIs and fluid collections. LFTs trending down nicely.     Patient is currently on entecavir 0.5 mg, Hepagam, mycophenalate 1000 mg BID, prednisone 20 mg and prophylaxis with Bactrim, fluconazole and Valgancyclovir.    Nephrology consulted for acute kidney failure is setting of Orthotopic Liver Transplant. Cr 0.8 on 10/05, started to trend up and 1.5 (10/09), 1.8 (10/10), and 1.9 (10/11). UOP 10/10/18: 520 ml. UA (10/09): 1+ occult blood and 1+ protein with RBC and amorphous casts.        Interval History: Patient is felling a bit tired with her steroid dosing. She was able to collect the 24 hour urine, She remains to have good urine output and does not have any uremic  symptoms.     Review of patient's allergies indicates:  No Known Allergies  Current Facility-Administered Medications   Medication Frequency    amoxicillin capsule 500 mg Q12H    bisacodyl EC tablet 10 mg Daily PRN    bisacodyl suppository 10 mg Daily PRN    calcium carbonate 200 mg calcium (500 mg) chewable tablet 500 mg TID PRN    dextrose 50% injection 12.5 g PRN    dextrose 50% injection 25 g PRN    docusate sodium capsule 100 mg Daily    entecavir tablet 0.5 mg Q72H    fluconazole tablet 200 mg Daily    glucagon (human recombinant) injection 1 mg PRN    glucose chewable tablet 16 g PRN    glucose chewable tablet 24 g PRN    hydrocortisone sodium succinate injection 25 mg Q8H    insulin aspart U-100 pen 0-5 Units QID (AC + HS) PRN    insulin aspart U-100 pen 12 Units TIDWM    levoFLOXacin tablet 250 mg Daily    magnesium oxide tablet 800 mg BID    midodrine tablet 15 mg TID    mirtazapine tablet 7.5 mg QHS    mycophenolate capsule 1,000 mg BID    ondansetron disintegrating tablet 8 mg Q8H PRN    ondansetron injection 4 mg Q8H PRN    oxyCODONE immediate release tablet 5 mg Q4H PRN    oxyCODONE immediate release tablet Tab 10 mg Q4H PRN    pantoprazole injection 40 mg BID    polyethylene glycol packet 17 g Daily PRN    polyethylene glycol packet 17 g Daily    promethazine (PHENERGAN) 6.25 mg in dextrose 5 % 50 mL IVPB Q6H PRN    sodium chloride 0.9% flush 3 mL PRN    sulfamethoxazole-trimethoprim 400-80mg per tablet 1 tablet Every Mon, Wed, Fri    trazodone split tablet 25 mg QHS    ursodiol capsule 300 mg BID    valGANciclovir tablet 450 mg Twice Weekly       Objective:     Vital Signs (Most Recent):  Temp: 97.6 °F (36.4 °C) (10/30/18 1209)  Pulse: 98 (10/30/18 1129)  Resp: 18 (10/30/18 0815)  BP: (!) 101/51 (10/30/18 1030)  SpO2: 98 % (10/30/18 1100)  O2 Device (Oxygen Therapy): room air (10/30/18 1100) Vital Signs (24h Range):  Temp:  [97.4 °F (36.3 °C)-97.9 °F (36.6 °C)] 97.6  °F (36.4 °C)  Pulse:  [] 98  Resp:  [11-20] 18  SpO2:  [95 %-100 %] 98 %  BP: (101-147)/(51-83) 101/51     Weight: 73.4 kg (161 lb 13.1 oz) (10/28/18 0500)  Body mass index is 28.66 kg/m².  Body surface area is 1.81 meters squared.    I/O last 3 completed shifts:  In: 1580 [P.O.:1330; Blood:250]  Out: 1400 [Urine:1400]    Physical Exam   Constitutional: She is oriented to person, place, and time.   Temporal and distal extremity muscle wasting   Eyes: EOM are normal. Pupils are equal, round, and reactive to light. Scleral icterus is present.   Neck: Normal range of motion.   Cardiovascular: Normal rate and regular rhythm.   No murmur heard.  Pulmonary/Chest: Effort normal and breath sounds normal. No respiratory distress. She has no wheezes.   Abdominal: Soft. Bowel sounds are normal. She exhibits distension. There is tenderness. There is no guarding.   Chevron incision w/ staples, CDI   Musculoskeletal: Normal range of motion. She exhibits edema (3+ BLE).   Neurological: She is alert and oriented to person, place, and time.   Skin: Skin is warm and dry.   Nursing note and vitals reviewed.      Significant Labs:  CBC:   Recent Labs   Lab 10/30/18  0600   WBC 3.18*   RBC 2.79*   HGB 8.4*   HCT 25.1*   PLT 99*   MCV 90   MCH 30.1   MCHC 33.5     CMP:   Recent Labs   Lab 10/30/18  0600      CALCIUM 8.7   ALBUMIN 2.5*   PROT 4.2*   *   K 3.5   CO2 26   CL 94*   *   CREATININE 2.3*   ALKPHOS 123   ALT 17   AST 23   BILITOT 4.2*     All labs within the past 24 hours have been reviewed.     Significant Imaging:  Labs: Reviewed    Assessment/Plan:      Post LT - Acute renal failure    Post-LT EVA is multifactorial in origin and has been related to the severity of liver disease, toxicity of immunosuppressive therapy and hepatic ischaemia reperfusion injury might be a driving force in the aetiology of post-LT EVA.      - acute gradual worsening of renal function s/p OLT  - 10/06 was taken to the OR  for possibility of billiary leak and post op complication due to hypercapnic hypoxic respiratory failure, no blood pressure drop intraoperatively per anesthesiology notes. Patient has not been needing vasopressors to maintain BP  - Creatinine on arrival 0.8 and has been up trending   - BUN/cr (10/11/18): 91/1.9; (10/12/18): 100/2.0; (10/13/18): 110/2.4  - Tacrolimus 26.9 (10/08); 6.8 (10/12); 5.6 (10/13), remains in none nephrotoxic levels   - Protein Creatine ratio: 0.82  - UA consistent with hematuria, proteinuria, RBC and amorphous casts   - Fena 0.5%; consistent with hypovolemia   - Renal US negative for obstruction, stricture or, hydronephrosis    Assessment: Acute Renal failure in setting of hepatic ischemia reperfusion injury and poor perfusion status vs acute tubular necrosis from immunosuppressive agents      Plan:  -SLED for uremic toxin clearance was perfromed for uremic platelet dysfunction. She remained to be anemic and still required PRBCs. Will continue to watch her kidney function but for now do not think that SLED provided any benefit    - Stop lasix as patient is orthostatic , can give albumin  - recommend chest xray  - Replete her magnesium as it remains low   -Modified renal diet : decrease in protein , normal intake of K and phos   - avoid nephrotoxic drugs, ACEI/ARB, NSAIDS  - adjust drugs to GFR  - Agree with fluid restriction of 1500 cc/day  - Acute RRT not indicated at this point  - Pending creatine clearance if <20 will consider SLED  - Patient has azotemia ( elevated BUN) she is not uremic. The etiology is likely the GI bleed she is known to have which will cause elevation of BUN.                                      Thank you for your consult. I will follow-up with patient. Please contact us if you have any additional questions.    Vaishali Ji MD  Nephrology  [facility]

## 2018-10-30 NOTE — ASSESSMENT & PLAN NOTE
- Fluctuating H/H.    - Transfuse 10/14; 10/16; 10/18; 10/19; 10/22; 10/27; 10/29  - Decreased platelets -- 1 u plt given 10/18.  - DDAVP given 10/18 and 10/19.  - FOBT +.  - LDH elevated, Hapto < 10.  - EGD 10/18: ulcerated duodenal mucosa.   - Monitoring w/ CBC q12hr to assess the need for transfusion.  - Transfused 1u yesterday--responded appropriately. Monitor

## 2018-10-31 PROBLEM — D72.819 LEUKOPENIA: Status: ACTIVE | Noted: 2018-10-31

## 2018-10-31 LAB
ALBUMIN SERPL BCP-MCNC: 2.6 G/DL
ALP SERPL-CCNC: 113 U/L
ALT SERPL W/O P-5'-P-CCNC: 16 U/L
ANION GAP SERPL CALC-SCNC: 12 MMOL/L
AST SERPL-CCNC: 20 U/L
BASOPHILS # BLD AUTO: 0.01 K/UL
BASOPHILS NFR BLD: 0.5 %
BILIRUB SERPL-MCNC: 3 MG/DL
BUN SERPL-MCNC: 127 MG/DL
CALCIUM SERPL-MCNC: 8.9 MG/DL
CHLORIDE SERPL-SCNC: 94 MMOL/L
CO2 SERPL-SCNC: 26 MMOL/L
CREAT SERPL-MCNC: 2.3 MG/DL
DIFFERENTIAL METHOD: ABNORMAL
EOSINOPHIL # BLD AUTO: 0 K/UL
EOSINOPHIL NFR BLD: 1.4 %
ERYTHROCYTE [DISTWIDTH] IN BLOOD BY AUTOMATED COUNT: 18.9 %
EST. GFR  (AFRICAN AMERICAN): 24.3 ML/MIN/1.73 M^2
EST. GFR  (NON AFRICAN AMERICAN): 21.1 ML/MIN/1.73 M^2
GLUCOSE SERPL-MCNC: 123 MG/DL
HCT VFR BLD AUTO: 24.6 %
HGB BLD-MCNC: 8.2 G/DL
IMM GRANULOCYTES # BLD AUTO: 0.03 K/UL
IMM GRANULOCYTES NFR BLD AUTO: 1.4 %
INR PPP: 1
LYMPHOCYTES # BLD AUTO: 0.2 K/UL
LYMPHOCYTES NFR BLD: 8.7 %
MAGNESIUM SERPL-MCNC: 2 MG/DL
MCH RBC QN AUTO: 30.3 PG
MCHC RBC AUTO-ENTMCNC: 33.3 G/DL
MCV RBC AUTO: 91 FL
MONOCYTES # BLD AUTO: 0.2 K/UL
MONOCYTES NFR BLD: 9.2 %
NEUTROPHILS # BLD AUTO: 1.7 K/UL
NEUTROPHILS NFR BLD: 78.8 %
NRBC BLD-RTO: 0 /100 WBC
PHOSPHATE SERPL-MCNC: 4.1 MG/DL
PLATELET # BLD AUTO: 95 K/UL
PMV BLD AUTO: 10.7 FL
POCT GLUCOSE: 141 MG/DL (ref 70–110)
POCT GLUCOSE: 168 MG/DL (ref 70–110)
POCT GLUCOSE: 172 MG/DL (ref 70–110)
POCT GLUCOSE: 197 MG/DL (ref 70–110)
POCT GLUCOSE: 90 MG/DL (ref 70–110)
POTASSIUM SERPL-SCNC: 3.5 MMOL/L
PROT SERPL-MCNC: 4.4 G/DL
PROTHROMBIN TIME: 10.8 SEC
RBC # BLD AUTO: 2.71 M/UL
SODIUM SERPL-SCNC: 132 MMOL/L
TACROLIMUS BLD-MCNC: 2.3 NG/ML
WBC # BLD AUTO: 2.18 K/UL

## 2018-10-31 PROCEDURE — 63600175 PHARM REV CODE 636 W HCPCS: Performed by: STUDENT IN AN ORGANIZED HEALTH CARE EDUCATION/TRAINING PROGRAM

## 2018-10-31 PROCEDURE — 80197 ASSAY OF TACROLIMUS: CPT

## 2018-10-31 PROCEDURE — 83735 ASSAY OF MAGNESIUM: CPT

## 2018-10-31 PROCEDURE — C9113 INJ PANTOPRAZOLE SODIUM, VIA: HCPCS | Performed by: PHYSICIAN ASSISTANT

## 2018-10-31 PROCEDURE — 63600175 PHARM REV CODE 636 W HCPCS: Mod: JG | Performed by: STUDENT IN AN ORGANIZED HEALTH CARE EDUCATION/TRAINING PROGRAM

## 2018-10-31 PROCEDURE — 85025 COMPLETE CBC W/AUTO DIFF WBC: CPT

## 2018-10-31 PROCEDURE — 94664 DEMO&/EVAL PT USE INHALER: CPT

## 2018-10-31 PROCEDURE — 25000003 PHARM REV CODE 250: Performed by: TRANSPLANT SURGERY

## 2018-10-31 PROCEDURE — 85610 PROTHROMBIN TIME: CPT

## 2018-10-31 PROCEDURE — 97110 THERAPEUTIC EXERCISES: CPT

## 2018-10-31 PROCEDURE — 63600175 PHARM REV CODE 636 W HCPCS: Performed by: PHYSICIAN ASSISTANT

## 2018-10-31 PROCEDURE — 25000003 PHARM REV CODE 250: Performed by: NURSE PRACTITIONER

## 2018-10-31 PROCEDURE — 25000003 PHARM REV CODE 250: Performed by: GENERAL PRACTICE

## 2018-10-31 PROCEDURE — 90945 DIALYSIS ONE EVALUATION: CPT

## 2018-10-31 PROCEDURE — 99233 SBSQ HOSP IP/OBS HIGH 50: CPT | Mod: ,,, | Performed by: INTERNAL MEDICINE

## 2018-10-31 PROCEDURE — 80053 COMPREHEN METABOLIC PANEL: CPT

## 2018-10-31 PROCEDURE — 25000003 PHARM REV CODE 250: Performed by: PHYSICIAN ASSISTANT

## 2018-10-31 PROCEDURE — 99900035 HC TECH TIME PER 15 MIN (STAT)

## 2018-10-31 PROCEDURE — 99233 SBSQ HOSP IP/OBS HIGH 50: CPT | Mod: 24,,, | Performed by: PHYSICIAN ASSISTANT

## 2018-10-31 PROCEDURE — 20600001 HC STEP DOWN PRIVATE ROOM

## 2018-10-31 PROCEDURE — 25000003 PHARM REV CODE 250: Performed by: STUDENT IN AN ORGANIZED HEALTH CARE EDUCATION/TRAINING PROGRAM

## 2018-10-31 PROCEDURE — 84100 ASSAY OF PHOSPHORUS: CPT

## 2018-10-31 RX ORDER — HYDROCODONE BITARTRATE AND ACETAMINOPHEN 500; 5 MG/1; MG/1
TABLET ORAL CONTINUOUS
Status: DISCONTINUED | OUTPATIENT
Start: 2018-10-31 | End: 2018-11-05

## 2018-10-31 RX ORDER — INSULIN ASPART 100 [IU]/ML
6 INJECTION, SOLUTION INTRAVENOUS; SUBCUTANEOUS ONCE
Status: COMPLETED | OUTPATIENT
Start: 2018-10-31 | End: 2018-10-31

## 2018-10-31 RX ADMIN — HYDROCORTISONE SODIUM SUCCINATE 25 MG: 100 INJECTION, POWDER, FOR SOLUTION INTRAMUSCULAR; INTRAVENOUS at 06:10

## 2018-10-31 RX ADMIN — MIDODRINE HYDROCHLORIDE 15 MG: 5 TABLET ORAL at 08:10

## 2018-10-31 RX ADMIN — PANTOPRAZOLE SODIUM 40 MG: 40 INJECTION, POWDER, FOR SOLUTION INTRAVENOUS at 09:10

## 2018-10-31 RX ADMIN — ALTEPLASE 2 MG: 2.2 INJECTION, POWDER, LYOPHILIZED, FOR SOLUTION INTRAVENOUS at 10:10

## 2018-10-31 RX ADMIN — INSULIN ASPART 12 UNITS: 100 INJECTION, SOLUTION INTRAVENOUS; SUBCUTANEOUS at 12:10

## 2018-10-31 RX ADMIN — LEVOFLOXACIN 250 MG: 250 TABLET, FILM COATED ORAL at 09:10

## 2018-10-31 RX ADMIN — MIDODRINE HYDROCHLORIDE 15 MG: 5 TABLET ORAL at 09:10

## 2018-10-31 RX ADMIN — INSULIN ASPART 12 UNITS: 100 INJECTION, SOLUTION INTRAVENOUS; SUBCUTANEOUS at 09:10

## 2018-10-31 RX ADMIN — MAGNESIUM OXIDE TAB 400 MG (241.3 MG ELEMENTAL MG) 800 MG: 400 (241.3 MG) TAB at 08:10

## 2018-10-31 RX ADMIN — URSODIOL 300 MG: 300 CAPSULE ORAL at 09:10

## 2018-10-31 RX ADMIN — MAGNESIUM OXIDE TAB 400 MG (241.3 MG ELEMENTAL MG) 800 MG: 400 (241.3 MG) TAB at 09:10

## 2018-10-31 RX ADMIN — MYCOPHENOLATE MOFETIL 1000 MG: 250 CAPSULE ORAL at 09:10

## 2018-10-31 RX ADMIN — INSULIN ASPART 6 UNITS: 100 INJECTION, SOLUTION INTRAVENOUS; SUBCUTANEOUS at 06:10

## 2018-10-31 RX ADMIN — MIDODRINE HYDROCHLORIDE 15 MG: 5 TABLET ORAL at 02:10

## 2018-10-31 RX ADMIN — URSODIOL 300 MG: 300 CAPSULE ORAL at 08:10

## 2018-10-31 RX ADMIN — SULFAMETHOXAZOLE AND TRIMETHOPRIM 1 TABLET: 400; 80 TABLET ORAL at 09:10

## 2018-10-31 RX ADMIN — HYDROCORTISONE SODIUM SUCCINATE 50 MG: 100 INJECTION, POWDER, FOR SOLUTION INTRAMUSCULAR; INTRAVENOUS at 08:10

## 2018-10-31 RX ADMIN — SODIUM CHLORIDE 1000 ML: 0.9 INJECTION, SOLUTION INTRAVENOUS at 09:10

## 2018-10-31 RX ADMIN — AMOXICILLIN 500 MG: 500 CAPSULE ORAL at 08:10

## 2018-10-31 RX ADMIN — FLUCONAZOLE 200 MG: 200 TABLET ORAL at 09:10

## 2018-10-31 RX ADMIN — PANTOPRAZOLE SODIUM 40 MG: 40 INJECTION, POWDER, FOR SOLUTION INTRAVENOUS at 08:10

## 2018-10-31 RX ADMIN — AMOXICILLIN 500 MG: 500 CAPSULE ORAL at 09:10

## 2018-10-31 RX ADMIN — MIRTAZAPINE 7.5 MG: 7.5 TABLET ORAL at 08:10

## 2018-10-31 RX ADMIN — HYDROCORTISONE SODIUM SUCCINATE 50 MG: 100 INJECTION, POWDER, FOR SOLUTION INTRAMUSCULAR; INTRAVENOUS at 02:10

## 2018-10-31 RX ADMIN — DOCUSATE SODIUM 100 MG: 100 CAPSULE, LIQUID FILLED ORAL at 09:10

## 2018-10-31 NOTE — PLAN OF CARE
Problem: Patient Care Overview  Goal: Plan of Care Review  Outcome: Ongoing (interventions implemented as appropriate)    Pt AAOx4 with Daughter, Gabriela, at the bedside. Pt on contact iso for ESBL in urine.  Pt is jaundiced. Sclera yellow. 4+ edema to BLE. Compression stockings on. Abdomen distended.  Cr still 2.3 yesterday. Continuing to hold Lasix and Prograf and giving hydrocortisone IV Q8h in lieu of Prograf.   Nephrology note states will consider long term HD if Cr clearance <20. 24 hour urine showed Cr. Clearance of 7.  R IJ TLC CDI.   Pt up with walker and 1 to 2 assist.  VSS. Pt on tele- SR with HR 70's-90's. ST hr in 120's when up to bathroom. Orthostatic BPs ordered Q shift. Will obtain reading this AM.   Pt with increased SOB. Requesting to be placed on NC despite satting 96% on room air. Satting 99%-100% on 1.5 L per NC.   Chest Xray done yesterday showed abundant fluid in bilateral pleural spaces.   Chevron incision JULIANE with staples CDI. Painted with betadine by pt daughter.  Pt with serous leakage from RLQ- old VALORIE sites. ABD pad and gown changed once.  H&h stable yesterday @ 8.4& 25.1  Accuchecks AC/HS. Pre-dinner . Pt given 12 units of Novolog. HS . No coverage needed.  No acute changes over night.  Pt remained free from falls or injury thus far. Bed is in low/ locked position, side rails are up x 2, call light is in reach.   Will continue to monitor.

## 2018-10-31 NOTE — NURSING
Dr. De Dios notified of pre-dinner glucose 90, patient only eating soup/brocolli ( 16 gms CHO), order for 6 units Novolog x 1

## 2018-10-31 NOTE — PLAN OF CARE
Problem: Occupational Therapy Goal  Goal: Occupational Therapy Goal  Goals to be met by: 11/7/18 ( revised 10/24/18)    Patient will increase functional independence with ADLs by performing:    Feeding with [place].  UE Dressing with Supervision.  LE Dressing with Supervision.  Grooming while standing at sink with Supervision.  Toileting from toilet with Supervision for hygiene and clothing management.   Toilet transfer to toilet with Supervision.      Outcome: Ongoing (interventions implemented as appropriate)  Continue with POC.   Laura corado OT  10/31/2018

## 2018-10-31 NOTE — SUBJECTIVE & OBJECTIVE
Scheduled Meds:   amoxicillin  500 mg Oral Q12H    docusate sodium  100 mg Oral Daily    entecavir  0.5 mg Oral Q72H    fluconazole  200 mg Oral Daily    hydrocortisone sodium succinate  50 mg Intravenous Q8H    insulin aspart U-100  12 Units Subcutaneous TIDWM    levoFLOXacin  250 mg Oral Daily    magnesium oxide  800 mg Oral BID    midodrine  15 mg Oral TID    mirtazapine  7.5 mg Oral QHS    pantoprazole  40 mg Intravenous BID    polyethylene glycol  17 g Oral Daily    ursodiol  300 mg Oral BID     Continuous Infusions:  PRN Meds:bisacodyl, bisacodyl, calcium carbonate, dextrose 50%, dextrose 50%, glucagon (human recombinant), glucose, glucose, insulin aspart U-100, ondansetron, ondansetron, oxyCODONE, oxyCODONE, polyethylene glycol, promethazine (PHENERGAN) IVPB, sodium chloride 0.9%    Review of Systems   Constitutional: Positive for activity change, appetite change and fatigue. Negative for fever.   Respiratory: Negative for cough and shortness of breath.    Cardiovascular: Positive for leg swelling. Negative for chest pain.   Gastrointestinal: Positive for abdominal distention and abdominal pain. Negative for constipation, diarrhea, nausea and vomiting.   Genitourinary: Negative for decreased urine volume, difficulty urinating and dysuria.   Musculoskeletal: Negative for arthralgias and back pain.   Allergic/Immunologic: Positive for immunocompromised state.   Neurological: Positive for weakness. Negative for tremors and headaches.     Objective:     Vital Signs (Most Recent):  Temp: 97.7 °F (36.5 °C) (10/31/18 1130)  Pulse: (!) 122 (10/31/18 1232)  Resp: 18 (10/31/18 1130)  BP: 113/62 (10/31/18 1232)  SpO2: 100 % (10/31/18 1130) Vital Signs (24h Range):  Temp:  [97.7 °F (36.5 °C)-98 °F (36.7 °C)] 97.7 °F (36.5 °C)  Pulse:  [] 122  Resp:  [11-22] 18  SpO2:  [94 %-100 %] 100 %  BP: (113-136)/(58-84) 113/62     Weight: 73.4 kg (161 lb 13.1 oz)  Body mass index is 28.66 kg/m².    Intake/Output  - Last 3 Shifts       10/29 0700 - 10/30 0659 10/30 0700 - 10/31 0659 10/31 0700 - 11/01 0659    P.O. 1330 1320 150    Blood 250      Total Intake(mL/kg) 1580 (21.5) 1320 (18) 150 (2)    Urine (mL/kg/hr) 1100 (0.6) 1150 (0.7) 150 (0.3)    Emesis/NG output       Other       Stool 0 0 0    Blood       Total Output 1100 1150 150    Net +480 +170 0           Stool Occurrence 2 x 4 x 1 x          Physical Exam   Constitutional: She is oriented to person, place, and time.   Temporal and distal extremity muscle wasting   Eyes: EOM are normal. Pupils are equal, round, and reactive to light. Scleral icterus is present.   Neck: Normal range of motion.   Cardiovascular: Normal rate and regular rhythm.   No murmur heard.  Pulmonary/Chest: Effort normal and breath sounds normal. No respiratory distress. She has no wheezes.   Abdominal: Soft. Bowel sounds are normal. She exhibits distension. There is tenderness. There is no guarding.   Chevron incision w/ staples, CDI  Serous leakage from RLQ old VALORIE site   Musculoskeletal: Normal range of motion. She exhibits edema (3+ BLE).   Neurological: She is alert and oriented to person, place, and time.   Skin: Skin is warm and dry.   Nursing note and vitals reviewed.      Laboratory:  Immunosuppressants     None        CBC:   Recent Labs   Lab 10/31/18  0600   WBC 2.18*   RBC 2.71*   HGB 8.2*   HCT 24.6*   PLT 95*   MCV 91   MCH 30.3   MCHC 33.3     CMP:   Recent Labs   Lab 10/31/18  0600   *   CALCIUM 8.9   ALBUMIN 2.6*   PROT 4.4*   *   K 3.5   CO2 26   CL 94*   *   CREATININE 2.3*   ALKPHOS 113   ALT 16   AST 20   BILITOT 3.0*     Labs within the past 24 hours have been reviewed.    Diagnostic Results:  I have personally reviewed all pertinent imaging studies.

## 2018-10-31 NOTE — SUBJECTIVE & OBJECTIVE
Interval History: Patient describes frustration with her wound leaking. She states that is her biggest concern, creatinine remains 2.3 and .    Review of patient's allergies indicates:  No Known Allergies  Current Facility-Administered Medications   Medication Frequency    amoxicillin capsule 500 mg Q12H    bisacodyl EC tablet 10 mg Daily PRN    bisacodyl suppository 10 mg Daily PRN    calcium carbonate 200 mg calcium (500 mg) chewable tablet 500 mg TID PRN    dextrose 50% injection 12.5 g PRN    dextrose 50% injection 25 g PRN    docusate sodium capsule 100 mg Daily    entecavir tablet 0.5 mg Q72H    fluconazole tablet 200 mg Daily    glucagon (human recombinant) injection 1 mg PRN    glucose chewable tablet 16 g PRN    glucose chewable tablet 24 g PRN    hydrocortisone sodium succinate injection 50 mg Q8H    insulin aspart U-100 pen 0-5 Units QID (AC + HS) PRN    insulin aspart U-100 pen 12 Units TIDWM    levoFLOXacin tablet 250 mg Daily    magnesium oxide tablet 800 mg BID    midodrine tablet 15 mg TID    mirtazapine tablet 7.5 mg QHS    ondansetron disintegrating tablet 8 mg Q8H PRN    ondansetron injection 4 mg Q8H PRN    oxyCODONE immediate release tablet 5 mg Q4H PRN    oxyCODONE immediate release tablet Tab 10 mg Q4H PRN    pantoprazole injection 40 mg BID    polyethylene glycol packet 17 g Daily PRN    polyethylene glycol packet 17 g Daily    promethazine (PHENERGAN) 6.25 mg in dextrose 5 % 50 mL IVPB Q6H PRN    sodium chloride 0.9% flush 3 mL PRN    ursodiol capsule 300 mg BID       Objective:     Vital Signs (Most Recent):  Temp: 97.7 °F (36.5 °C) (10/31/18 1130)  Pulse: 100 (10/31/18 1442)  Resp: 18 (10/31/18 1130)  BP: 113/62 (10/31/18 1232)  SpO2: 100 % (10/31/18 1130)  O2 Device (Oxygen Therapy): room air (10/31/18 1130) Vital Signs (24h Range):  Temp:  [97.7 °F (36.5 °C)-98 °F (36.7 °C)] 97.7 °F (36.5 °C)  Pulse:  [] 100  Resp:  [11-22] 18  SpO2:  [94 %-100  %] 100 %  BP: (113-136)/(58-84) 113/62     Weight: 73.4 kg (161 lb 13.1 oz) (10/28/18 0500)  Body mass index is 28.66 kg/m².  Body surface area is 1.81 meters squared.    I/O last 3 completed shifts:  In: 1680 [P.O.:1680]  Out: 1500 [Urine:1500]    Physical Exam   Constitutional: She is oriented to person, place, and time.   Temporal and distal extremity muscle wasting   Eyes: EOM are normal. Pupils are equal, round, and reactive to light. Scleral icterus is present.   Neck: Normal range of motion.   Cardiovascular: Normal rate and regular rhythm.   No murmur heard.  Pulmonary/Chest: Effort normal and breath sounds normal. No respiratory distress. She has no wheezes.   Abdominal: Soft. Bowel sounds are normal. She exhibits distension. There is tenderness. There is no guarding.   Chevron incision w/ staples, CDI   Musculoskeletal: Normal range of motion. She exhibits edema (3+ BLE).   Neurological: She is alert and oriented to person, place, and time.   Skin: Skin is warm and dry.   Nursing note and vitals reviewed.      Significant Labs:  CBC:   Recent Labs   Lab 10/31/18  0600   WBC 2.18*   RBC 2.71*   HGB 8.2*   HCT 24.6*   PLT 95*   MCV 91   MCH 30.3   MCHC 33.3     CMP:   Recent Labs   Lab 10/31/18  0600   *   CALCIUM 8.9   ALBUMIN 2.6*   PROT 4.4*   *   K 3.5   CO2 26   CL 94*   *   CREATININE 2.3*   ALKPHOS 113   ALT 16   AST 20   BILITOT 3.0*     All labs within the past 24 hours have been reviewed.     Significant Imaging:  Labs: Reviewed  X-Ray: Reviewed

## 2018-10-31 NOTE — PROGRESS NOTES
[facility]  Nephrology  Progress Note    Patient Name: Scarlett Reddy  MRN: [id number]  Admission Date: 10/1/2018  Hospital Length of Stay: 29 days  Attending Provider: Jani Theodore MD   Primary Care Physician: Primary Doctor No  Principal Problem:Liver transplanted    Subjective:     HPI: 69 y/o female with ESLD due to Hep B and D cirrhosis MELD of 23 on 10/01/18.  Paracentesis 10/1 with 5L removed, no fluid sent for analysis. 10/02 significant for hyponatremia, Na 119 and she was admitted to LTS for sodium monitoring. On 10/05/18 she is s/p DBDLT (CMV D+/R+, steroid induction, MMF/tacro/pred maintenance) with take back on 10/6 secondary to hyperbilirubinema, no leak identified. Post-op course complicated by hypercapneic and hypoxic respiratory failure and was intubated 10/06 and extubated 10/07. Liver bx (10/6) sig for Zone 3 hemorrhage and collapse, in addition to cholestasis. ID consulted to comment on positive diphtheroid culture from ascitic fluid (likely skin colonization) as well as ESBL Klebsiella pneumoniae in urine culture (10/4).  Pt asymptomatic and cell count from ascitic fluid unremarkable. Mild peritransplant ascites- repeat US 10/6 with increased arterial resistive indices. Repeat US 10/9 w/ continued elevation in RIs and fluid collections. LFTs trending down nicely.     Patient is currently on entecavir 0.5 mg, Hepagam, mycophenalate 1000 mg BID, prednisone 20 mg and prophylaxis with Bactrim, fluconazole and Valgancyclovir.    Nephrology consulted for acute kidney failure is setting of Orthotopic Liver Transplant. Cr 0.8 on 10/05, started to trend up and 1.5 (10/09), 1.8 (10/10), and 1.9 (10/11). UOP 10/10/18: 520 ml. UA (10/09): 1+ occult blood and 1+ protein with RBC and amorphous casts.        Interval History: Patient describes frustration with her wound leaking. She states that is her biggest concern, creatinine remains 2.3 and .    Review of patient's allergies  indicates:  No Known Allergies  Current Facility-Administered Medications   Medication Frequency    amoxicillin capsule 500 mg Q12H    bisacodyl EC tablet 10 mg Daily PRN    bisacodyl suppository 10 mg Daily PRN    calcium carbonate 200 mg calcium (500 mg) chewable tablet 500 mg TID PRN    dextrose 50% injection 12.5 g PRN    dextrose 50% injection 25 g PRN    docusate sodium capsule 100 mg Daily    entecavir tablet 0.5 mg Q72H    fluconazole tablet 200 mg Daily    glucagon (human recombinant) injection 1 mg PRN    glucose chewable tablet 16 g PRN    glucose chewable tablet 24 g PRN    hydrocortisone sodium succinate injection 50 mg Q8H    insulin aspart U-100 pen 0-5 Units QID (AC + HS) PRN    insulin aspart U-100 pen 12 Units TIDWM    levoFLOXacin tablet 250 mg Daily    magnesium oxide tablet 800 mg BID    midodrine tablet 15 mg TID    mirtazapine tablet 7.5 mg QHS    ondansetron disintegrating tablet 8 mg Q8H PRN    ondansetron injection 4 mg Q8H PRN    oxyCODONE immediate release tablet 5 mg Q4H PRN    oxyCODONE immediate release tablet Tab 10 mg Q4H PRN    pantoprazole injection 40 mg BID    polyethylene glycol packet 17 g Daily PRN    polyethylene glycol packet 17 g Daily    promethazine (PHENERGAN) 6.25 mg in dextrose 5 % 50 mL IVPB Q6H PRN    sodium chloride 0.9% flush 3 mL PRN    ursodiol capsule 300 mg BID       Objective:     Vital Signs (Most Recent):  Temp: 97.7 °F (36.5 °C) (10/31/18 1130)  Pulse: 100 (10/31/18 1442)  Resp: 18 (10/31/18 1130)  BP: 113/62 (10/31/18 1232)  SpO2: 100 % (10/31/18 1130)  O2 Device (Oxygen Therapy): room air (10/31/18 1130) Vital Signs (24h Range):  Temp:  [97.7 °F (36.5 °C)-98 °F (36.7 °C)] 97.7 °F (36.5 °C)  Pulse:  [] 100  Resp:  [11-22] 18  SpO2:  [94 %-100 %] 100 %  BP: (113-136)/(58-84) 113/62     Weight: 73.4 kg (161 lb 13.1 oz) (10/28/18 0500)  Body mass index is 28.66 kg/m².  Body surface area is 1.81 meters squared.    I/O last 3  completed shifts:  In: 1680 [P.O.:1680]  Out: 1500 [Urine:1500]    Physical Exam   Constitutional: She is oriented to person, place, and time.   Temporal and distal extremity muscle wasting   Eyes: EOM are normal. Pupils are equal, round, and reactive to light. Scleral icterus is present.   Neck: Normal range of motion.   Cardiovascular: Normal rate and regular rhythm.   No murmur heard.  Pulmonary/Chest: Effort normal and breath sounds normal. No respiratory distress. She has no wheezes.   Abdominal: Soft. Bowel sounds are normal. She exhibits distension. There is tenderness. There is no guarding.   Chevron incision w/ staples, CDI   Musculoskeletal: Normal range of motion. She exhibits edema (3+ BLE).   Neurological: She is alert and oriented to person, place, and time.   Skin: Skin is warm and dry.   Nursing note and vitals reviewed.      Significant Labs:  CBC:   Recent Labs   Lab 10/31/18  0600   WBC 2.18*   RBC 2.71*   HGB 8.2*   HCT 24.6*   PLT 95*   MCV 91   MCH 30.3   MCHC 33.3     CMP:   Recent Labs   Lab 10/31/18  0600   *   CALCIUM 8.9   ALBUMIN 2.6*   PROT 4.4*   *   K 3.5   CO2 26   CL 94*   *   CREATININE 2.3*   ALKPHOS 113   ALT 16   AST 20   BILITOT 3.0*     All labs within the past 24 hours have been reviewed.     Significant Imaging:  Labs: Reviewed  X-Ray: Reviewed    Assessment/Plan:      Post LT - Acute renal failure    Post-LT EVA is multifactorial in origin and has been related to the severity of liver disease, toxicity of immunosuppressive therapy and hepatic ischaemia reperfusion injury might be a driving force in the aetiology of post-LT EVA.      - acute gradual worsening of renal function s/p OLT  - 10/06 was taken to the OR for possibility of billiary leak and post op complication due to hypercapnic hypoxic respiratory failure, no blood pressure drop intraoperatively per anesthesiology notes. Patient has not been needing vasopressors to maintain BP  - Creatinine on  arrival 0.8 and has been up trending   - BUN/cr (10/11/18): 91/1.9; (10/12/18): 100/2.0; (10/13/18): 110/2.4  - Tacrolimus 26.9 (10/08); 6.8 (10/12); 5.6 (10/13), remains in none nephrotoxic levels   - Protein Creatine ratio: 0.82  - UA consistent with hematuria, proteinuria, RBC and amorphous casts   - Fena 0.5%; consistent with hypovolemia   - Renal US negative for obstruction, stricture or, hydronephrosis    Assessment: Acute Renal failure in setting of hepatic ischemia reperfusion injury and poor perfusion status vs acute tubular necrosis from immunosuppressive agents      Plan:  -SLED for uremic toxin clearance was perfromed for uremic platelet dysfunction. She remained to be anemic and still required PRBCs. Will continue to watch her kidney function   - Stop lasix as patient is orthostatic , can give albumin  -Chest xray with bilateral pleural fluid  -Modified renal diet : decrease in protein , normal intake of K and phos   - avoid nephrotoxic drugs, ACEI/ARB, NSAIDS  - adjust drugs to GFR  - Agree with fluid restriction of 1500 cc/day  - Will provide SLED this evening around 8 hours with 500cc off.   - Patient has azotemia ( elevated BUN) she is not uremic. The etiology is likely the GI bleed she is known to have which will cause elevation of BUN.     This plan was discussed with patient and family at bedside using the [facility]                                   Thank you for your consult. I will follow-up with patient. Please contact us if you have any additional questions.    Vaishali Ji MD  Nephrology  [facility]

## 2018-10-31 NOTE — ASSESSMENT & PLAN NOTE
- Urine Cx 10/15 w/ Klebsiella pneumonia.  - Started Ciprofloxacin 10/17. Dc'd 10/19. Coverage w/ triple therapy for h. Pylori.   -Abx complete 11/2  - Pt asymptomatic at this time.

## 2018-10-31 NOTE — ASSESSMENT & PLAN NOTE
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      - acute gradual worsening of renal function s/p OLT  - 10/06 was taken to the OR for possibility of billiary leak and post op complication due to hypercapnic hypoxic respiratory failure, no blood pressure drop intraoperatively per anesthesiology notes. Patient has not been needing vasopressors to maintain BP  - Creatinine on arrival 0.8 and has been up trending   - BUN/cr (10/11/18): 91/1.9; (10/12/18): 100/2.0; (10/13/18): 110/2.4  - Tacrolimus 26.9 (10/08); 6.8 (10/12); 5.6 (10/13), remains in none nephrotoxic levels   - Protein Creatine ratio: 0.82  - UA consistent with hematuria, proteinuria, RBC and amorphous casts   - Fena 0.5%; consistent with hypovolemia   - Renal US negative for obstruction, stricture or, hydronephrosis    Assessment: Acute Renal failure in setting of hepatic ischemia reperfusion injury and poor perfusion status vs acute tubular necrosis from immunosuppressive agents      Plan:  -SLED for uremic toxin clearance was perfromed for uremic platelet dysfunction. She remained to be anemic and still required PRBCs. Will continue to watch her kidney function   - Stop lasix as patient is orthostatic , can give albumin  -Chest xray with bilateral pleural fluid  -Modified renal diet : decrease in protein , normal intake of K and phos   - avoid nephrotoxic drugs, ACEI/ARB, NSAIDS  - adjust drugs to GFR  - Agree with fluid restriction of 1500 cc/day  - Will provide SLED this evening around 8 hours with 500cc off.   - Patient has azotemia ( elevated BUN) she is not uremic. The etiology is likely the GI bleed she is known to have which will cause elevation of BUN.     This plan was discussed with patient and family at bedside using the [facility]

## 2018-10-31 NOTE — PT/OT/SLP PROGRESS
Occupational Therapy   Treatment    Name: Scarlett Reddy  MRN: [id number]  Admitting Diagnosis:  Liver transplanted  13 Days Post-Op    Recommendations:     Discharge Recommendations: home health OT, home health PT  Discharge Equipment Recommendations:  walker, rolling  Barriers to discharge:  None    Subjective     Communicated with: RN prior to session.  Pt agreeable to participate with therapy.  present throughout session.   Pain/Comfort:  · Pain Rating 1: 0/10  · Pain Rating Post-Intervention 1: 0/10    Patients cultural, spiritual, Yazdanism conflicts given the current situation: none reported    Objective:     Patient found with: telemetry, pulse ox (continuous)    General Precautions: Standard, fall, contact   Orthopedic Precautions:N/A   Braces: N/A     Occupational Performance:    Bed Mobility:    · Nt, pt found seated UIC upon arrival.     Functional Mobility/Transfers:  · Patient completed Sit <> Stand Transfer with moderate assistance  with  rolling walker from bedside chair   · Functional Mobility: Pt completed functional mobility in hallway ( with standing rest breaks throughout ) with CGA and RW for balance and safety. Tech present for chair follow.     Activities of Daily Living:  · Upper Body Dressing: minimum assistance to casper gown like jacket while seated EOB  · Lower Body Dressing: total assistance to casper B socks while seated EOB ( limited 2/2 signigicant swelling in BLEs)    Patient left up in chair reclined with BUE's elevated with all lines intact, call button in reach and RN notified    [facility] 6 Click:  AMPA Total Score: 17    Treatment & Education:  -Pt edu on OT role/POC  -Importance of OOB activity with staff assistance  -Safety during functional t/f and mobility  - White board updated  - Pt completed BUE AROM exercises while seated UIC including: 1x15 reps in shoulder flexion, elbow flexion/extension, chest press; thoracic and gentle cervical stretches performed. Pt tolerated well  with min rest breaks between each exercise.   Education:    Assessment:     Scarlett Reddy is a 68 y.o. female with a medical diagnosis of Liver transplanted.  She presents alert and motivated to participate in therapy.  Performance deficits affecting function are weakness, impaired endurance, impaired self care skills, impaired functional mobilty, impaired balance, gait instability, decreased lower extremity function, decreased upper extremity function, impaired cardiopulmonary response to activity.  She is progressing well with goals however mostly limited 2/2 swelling in BLE's. Pt would benefit from HHOT following d/c to continue to progress towards goals and improve quality of life.     Rehab Prognosis:  Good; patient would benefit from acute skilled OT services to address these deficits and reach maximum level of function.       Plan:     Patient to be seen 4 x/week to address the above listed problems via self-care/home management, therapeutic activities, therapeutic exercises, neuromuscular re-education  · Plan of Care Expires: 11/07/18  · Plan of Care Reviewed with: patient, daughter    This Plan of care has been discussed with the patient who was involved in its development and understands and is in agreement with the identified goals and treatment plan    GOALS:   Multidisciplinary Problems     Occupational Therapy Goals        Problem: Occupational Therapy Goal    Goal Priority Disciplines Outcome Interventions   Occupational Therapy Goal     OT, PT/OT Ongoing (interventions implemented as appropriate)    Description:  Goals to be met by: 11/7/18 ( revised 10/24/18)    Patient will increase functional independence with ADLs by performing:    Feeding with [place].  UE Dressing with Supervision.  LE Dressing with Supervision.  Grooming while standing at sink with Supervision.  Toileting from toilet with Supervision for hygiene and clothing management.   Toilet transfer to toilet with Supervision.                        Time Tracking:     OT Date of Treatment: 10/31/18  OT Start Time: 1052  OT Stop Time: 1115  OT Total Time (min): 23 min    Billable Minutes:Therapeutic Exercise 23    Laura corado OT  10/31/2018

## 2018-10-31 NOTE — ASSESSMENT & PLAN NOTE
- Diuresed w/ Lasix 100mg x 1 (10/12).  - CRRT 10/14.  .    - SLED 10/17, 10/20.  - Diuresed w/ Lasix 80 BID-- stopped (10/23).  - Will assess the need for diuresis on a daily basis.  -CXR 10/30: abundant fluid in b/l pleural space

## 2018-10-31 NOTE — ASSESSMENT & PLAN NOTE
- Creatinine on arrival 0.8 and trended up.  - BUN/cr (10/11/18): 91/1.9.   - Tacrolimus 26.9 (10/08).  - Nephrology consulted.  - Renal US (10/11): no hydronephrosis.  - Echo 10/12: diastolic dysfunction, likely 2/2 to ARF.  - pt on renal diet w 1500 ml FR.  - CRRT 10/14; SLED 10/17, 10/20. --> no improvements noted in kidney fxn.  - today  -24 hr urine creatine collection showed CrCl of 7.

## 2018-10-31 NOTE — PROGRESS NOTES
[facility]  Liver Transplant  Progress Note    Patient Name: Scarlett Reddy  MRN: [id number]  Admission Date: 10/1/2018  Hospital Length of Stay: 29 days  Code Status: Full Code  Primary Care Provider: Primary Doctor No  Post-Operative Day: 26    ORGAN:   LIVER  Disease Etiology: Cirrhosis: Type B and D  Donor Type:    - Brain Death  CDC High Risk:   No  Donor CMV Status:   Donor CMV Status: Positive  Donor HBcAB:   Negative  Donor HCV Status:   Negative  Donor HBV SETH: Negative  Donor HCV SETH: Negative  Whole or Partial: Whole Liver  Biliary Anastomosis: End to End  Arterial Anatomy: Replaced Left Hepatic and Right Hepatic  Subjective:     History of Present Illness:  Ms. Reddy is a 69 y/o female with PMH of ESLD secondary Hep B and D.  Listed for liver transplant with MELD 23.  Paracentesis 10/1 with 5L removed, no fluid sent for analysis.  Morning labs significant for hyponatremia, Na 119.  Pt admitted to LTS for sodium monitoring.        Hospital Course:  Hospital Course: 69 y/o F h/o HBV/delta cirrhosis s/p DBDLT 10/5/18 (CMV D+/R+, steroid induction, MMF/tacro/pred maintenance) with take back on 10/6 2/2 hyperbilirubinema and bilious VALORIE drainage, no leak identified. Post-op course c/b hypercapneic and hypoxic respiratory failure and was reintubated though quickly extubated now doing well. Liver bx (10/6) sig for Zone 3 hemorrhage and collapse, in addition to cholestasis. Transferred from ICU on POD #4. ID consulted to comment on positive diphtheroid culture from ascitic fluid (likely skin colonization) as well as ESBL Klebsiella pneumoniae in urine culture (10/4).  Pt asymptomatic and cell count from ascitic fluid unremarkable. Per ID recs, no need to treat diphtheroids or asymptomatic ESBL Kleb pneumo bacteriuria though pt has had 6 days of therapy which would cover these organisms. Mild peritransplant ascites- repeat US 10/6 with increased arterial resistive indices. Repeat US 10/9  "w/ continued elevation in RIs and fluid collections. LFTs trending down nicely.     Acute gradual worsening of renal function s/p OLTx.Creatinine on arrival 0.8 and has been up trending. Nephrology consulted for post-op EVA. Cr cont to trend up but remains to have good UOP. Had stable response to lasiz diuresis.  BUN elevated requiring multiple rounds of dialysis (10/15, 10/17, 10/20). No significant improvements in kidney function noted. In addition, pt H/H cont to fluctuate requiring multiple blood transfusions (10/14, 10/16, 10/18, 10/19, 10/22, 10/27, 10/29).  Pt reported dark stools 10/15 but color has normalized. FOBT+. EGD 10/18 consistent with ulcerated mucosa in duodenum s/p coagulation. Concern for H. Pylori. Started Amoxicillin/levofloxacin (10/19, complete 11/2)). Remains on Protonix 40 mg BID. Biopsy w/ no concerned findings. Will cont close monitoring H/H, transfuse as needed with goal Hb > 7.0.  Of note, Urine cx + Klebsiella pneumoniae (10/13). Received 3 day course of ciprofloxacin, dc'd 10/19.   On 10/19 pm pt c/o crushing chest pain. Troponin 0.094, likely 2/2 ischemic stress. EKG NSR.     Interval history: Pt feeling "50/50" this morning. Cr/BUN remain elevated. 24 hour urine collection showed CrCl of 7. Remains very volume overloaded. Discussed with Nephrology about dialyzing pt today.  Pt leukopenic. Cellcept, bactrim, valcyte held at this time. CMV PCR pending. Increased hydrocortisone dose. Remains off prograf and prednisone.  H/H stable. Pt denies melena. Pt eating well, +BM, ambulating. Cont to hold heparin for possible ongoing GI bleed. Monitor.    Scheduled Meds:   amoxicillin  500 mg Oral Q12H    docusate sodium  100 mg Oral Daily    entecavir  0.5 mg Oral Q72H    fluconazole  200 mg Oral Daily    hydrocortisone sodium succinate  50 mg Intravenous Q8H    insulin aspart U-100  12 Units Subcutaneous TIDWM    levoFLOXacin  250 mg Oral Daily    magnesium oxide  800 mg Oral BID    " midodrine  15 mg Oral TID    mirtazapine  7.5 mg Oral QHS    pantoprazole  40 mg Intravenous BID    polyethylene glycol  17 g Oral Daily    ursodiol  300 mg Oral BID     Continuous Infusions:  PRN Meds:bisacodyl, bisacodyl, calcium carbonate, dextrose 50%, dextrose 50%, glucagon (human recombinant), glucose, glucose, insulin aspart U-100, ondansetron, ondansetron, oxyCODONE, oxyCODONE, polyethylene glycol, promethazine (PHENERGAN) IVPB, sodium chloride 0.9%    Review of Systems   Constitutional: Positive for activity change, appetite change and fatigue. Negative for fever.   Respiratory: Negative for cough and shortness of breath.    Cardiovascular: Positive for leg swelling. Negative for chest pain.   Gastrointestinal: Positive for abdominal distention and abdominal pain. Negative for constipation, diarrhea, nausea and vomiting.   Genitourinary: Negative for decreased urine volume, difficulty urinating and dysuria.   Musculoskeletal: Negative for arthralgias and back pain.   Allergic/Immunologic: Positive for immunocompromised state.   Neurological: Positive for weakness. Negative for tremors and headaches.     Objective:     Vital Signs (Most Recent):  Temp: 97.7 °F (36.5 °C) (10/31/18 1130)  Pulse: (!) 122 (10/31/18 1232)  Resp: 18 (10/31/18 1130)  BP: 113/62 (10/31/18 1232)  SpO2: 100 % (10/31/18 1130) Vital Signs (24h Range):  Temp:  [97.7 °F (36.5 °C)-98 °F (36.7 °C)] 97.7 °F (36.5 °C)  Pulse:  [] 122  Resp:  [11-22] 18  SpO2:  [94 %-100 %] 100 %  BP: (113-136)/(58-84) 113/62     Weight: 73.4 kg (161 lb 13.1 oz)  Body mass index is 28.66 kg/m².    Intake/Output - Last 3 Shifts       10/29 0700 - 10/30 0659 10/30 0700 - 10/31 0659 10/31 0700 - 11/01 0659    P.O. 1330 1320 150    Blood 250      Total Intake(mL/kg) 1580 (21.5) 1320 (18) 150 (2)    Urine (mL/kg/hr) 1100 (0.6) 1150 (0.7) 150 (0.3)    Emesis/NG output       Other       Stool 0 0 0    Blood       Total Output 1100 1150 150    Net +480 +170  0           Stool Occurrence 2 x 4 x 1 x          Physical Exam   Constitutional: She is oriented to person, place, and time.   Temporal and distal extremity muscle wasting   Eyes: EOM are normal. Pupils are equal, round, and reactive to light. Scleral icterus is present.   Neck: Normal range of motion.   Cardiovascular: Normal rate and regular rhythm.   No murmur heard.  Pulmonary/Chest: Effort normal and breath sounds normal. No respiratory distress. She has no wheezes.   Abdominal: Soft. Bowel sounds are normal. She exhibits distension. There is tenderness. There is no guarding.   Chevron incision w/ staples, CDI  Serous leakage from RLQ old VALORIE site   Musculoskeletal: Normal range of motion. She exhibits edema (3+ BLE).   Neurological: She is alert and oriented to person, place, and time.   Skin: Skin is warm and dry.   Nursing note and vitals reviewed.      Laboratory:  Immunosuppressants     None        CBC:   Recent Labs   Lab 10/31/18  0600   WBC 2.18*   RBC 2.71*   HGB 8.2*   HCT 24.6*   PLT 95*   MCV 91   MCH 30.3   MCHC 33.3     CMP:   Recent Labs   Lab 10/31/18  0600   *   CALCIUM 8.9   ALBUMIN 2.6*   PROT 4.4*   *   K 3.5   CO2 26   CL 94*   *   CREATININE 2.3*   ALKPHOS 113   ALT 16   AST 20   BILITOT 3.0*     Labs within the past 24 hours have been reviewed.    Diagnostic Results:  I have personally reviewed all pertinent imaging studies.    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   - POD 1 US: increased arterial resistive indices, suggestive of edema vs cute rejection vs congestion.  - Repeat US 10/9 with continued elevation of RIs.  - LFTs stable.  - T bili trending down.  - repeat liver US (10/11): elevated RIs.           Leukopenia    -Stopped Cellcept, Bactrim, and Valcyte 10/31  -Monitor         Azotemia           Duodenal ulcer    - EGD 10/18- sig for ulcerated duodenal mucosa.   - Biopsy 10/18- no infection detected.  - Cont protonix 40 BID.  - Started H. Pylori tx 10/19: Amoxicillin/Levo. Treat x 14 days, end 11/2.       UTI (urinary tract infection)    - Urine Cx 10/15 w/ Klebsiella pneumonia.  - Started Ciprofloxacin 10/17. Dc'd 10/19. Coverage w/ triple therapy for h. Pylori.   -Abx complete 11/2  - Pt asymptomatic at this time.       Acute blood loss anemia    - H&H cont to fluctuate.   - FOBT +  - EGD 10/18- showed diffuse bleeding with ulcerated duodenal mucosa   - Monitoring w/ CBC q 12 to assess the need for transfusion.  - 1 unit PRBCs transfused 10/27. H&H stable today.            Post LT - Acute renal failure    - Creatinine on arrival 0.8 and trended up.  - BUN/cr (10/11/18): 91/1.9.   - Tacrolimus 26.9 (10/08).  - Nephrology consulted.  - Renal US (10/11): no hydronephrosis.  - Echo 10/12: diastolic dysfunction, likely 2/2 to ARF.  - pt on renal diet w 1500 ml FR.  - CRRT 10/14; SLED 10/17, 10/20. --> no improvements noted in kidney fxn.  - today  -24 hr urine creatine collection showed CrCl of 7.      Hypervolemia associated with renal insufficiency    - Diuresed w/ Lasix 100mg x 1 (10/12).  - CRRT 10/14.  .    - SLED 10/17, 10/20.  - Diuresed w/ Lasix 80 BID-- stopped (10/23).  - Will assess the need for diuresis on a daily basis.  -CXR 10/30: abundant fluid in b/l pleural space       EVA (acute kidney injury)           Metabolic acidosis    - CRRT 10/14.  - SLED 10/17, 10/20.         At risk for opportunistic infections    - Valcyte for CMV prophylaxis--hold (10/31) at this time for leukopenia. Pt will need weekly CMV PCR while discontinued.  - Bactrim for PCP prophylaxis (MWF).-- holding at this time for leukopenia  - Fluconazole for fungal prophylaxis.        Long-term use of immunosuppressant medication    -  Cellcept/Prograf/pred.-- stopped prednisone and  prograf (10/29). Will resume w/ improvements in kidney function  -Hydrocortisone started (10/29) --> increased dose today  -  Will monitor for signs of toxic side effects, check daily troughs, and change meds accordingly.       Prophylactic immunotherapy    - See long term use immunosuppresion.       Hypokalemia           Ascites    - paracentesis 10/1, 5L removed, no fluid sent for analysis.       Weakness    - see physical decond.       Physical deconditioning    - PT/OT consulted.  - Recommend home health PT and rolling walker at discharge.       Hyponatremia    - Na 119 on admit.  - Na was improving post transplant w HD.  - Na stable at this time.             Hypotension    - continue Midodrine 15 mg TID. Monitor.       Anemia requiring transfusions    - Fluctuating H/H.    - Transfuse 10/14; 10/16; 10/18; 10/19; 10/22; 10/27; 10/29  - Decreased platelets -- 1 u plt given 10/18.  - DDAVP given 10/18 and 10/19.  - FOBT +.  - LDH elevated, Hapto < 10.  - EGD 10/18: ulcerated duodenal mucosa.   - Monitoring w/ CBC q12hr to assess the need for transfusion.  - H/H stable       Severe malnutrition    - dietary consulted.   - temporal and distal extremity muscle wasting and edema.  - encourage supplements and to increase nutritional intake .  - pt on renal/low K+ diet.     Type 2 diabetes mellitus with diabetic polyneuropathy, with long-term current use of insulin    - continue home regimen.  - endocrine consulted.  - Pt off insulin drip at this time.  Apprec endo recs.        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  -Tx w/ Entecavir- dose changed to q72 hours given renal function.             VTE Risk Mitigation (From admission, onward)        Ordered     Place sequential compression device  Until discontinued      10/05/18 0709          The patients clinical status was discussed at multidisplinary rounds, involving transplant surgery, transplant medicine, pharmacy, nursing,  nutrition, and social work    Discharge Planning:  No Patient Care Coordination Note on file.      Jihan Soares PA-C  Liver Transplant  [facility]

## 2018-10-31 NOTE — ASSESSMENT & PLAN NOTE
- Valcyte for CMV prophylaxis--hold (10/31) at this time for leukopenia. Pt will need weekly CMV PCR while discontinued.  - Bactrim for PCP prophylaxis ([facility]).-- holding at this time for leukopenia  - Fluconazole for fungal prophylaxis.

## 2018-10-31 NOTE — ASSESSMENT & PLAN NOTE
-  Cellcept/Prograf/pred.-- stopped prednisone and prograf (10/29). Will resume w/ improvements in kidney function  -Hydrocortisone started (10/29) --> increased dose today  -  Will monitor for signs of toxic side effects, check daily troughs, and change meds accordingly.

## 2018-11-01 LAB
ABO + RH BLD: NORMAL
ALBUMIN SERPL BCP-MCNC: 2.6 G/DL
ALBUMIN SERPL BCP-MCNC: 3 G/DL
ALP SERPL-CCNC: 140 U/L
ALT SERPL W/O P-5'-P-CCNC: 21 U/L
ANION GAP SERPL CALC-SCNC: 12 MMOL/L
ANION GAP SERPL CALC-SCNC: 13 MMOL/L
AST SERPL-CCNC: 26 U/L
BASOPHILS # BLD AUTO: 0 K/UL
BASOPHILS NFR BLD: 0 %
BILIRUB SERPL-MCNC: 2.9 MG/DL
BLD GP AB SCN CELLS X3 SERPL QL: NORMAL
BLOOD GROUP ANTIBODIES SERPL: NORMAL
BUN SERPL-MCNC: 59 MG/DL
BUN SERPL-MCNC: 65 MG/DL
CALCIUM SERPL-MCNC: 8.3 MG/DL
CALCIUM SERPL-MCNC: 9.1 MG/DL
CHLORIDE SERPL-SCNC: 100 MMOL/L
CHLORIDE SERPL-SCNC: 102 MMOL/L
CO2 SERPL-SCNC: 21 MMOL/L
CO2 SERPL-SCNC: 23 MMOL/L
CREAT SERPL-MCNC: 1.1 MG/DL
CREAT SERPL-MCNC: 1.4 MG/DL
DIFFERENTIAL METHOD: ABNORMAL
EOSINOPHIL # BLD AUTO: 0 K/UL
EOSINOPHIL NFR BLD: 0.4 %
ERYTHROCYTE [DISTWIDTH] IN BLOOD BY AUTOMATED COUNT: 18.7 %
EST. GFR  (AFRICAN AMERICAN): 44.2 ML/MIN/1.73 M^2
EST. GFR  (AFRICAN AMERICAN): 59.2 ML/MIN/1.73 M^2
EST. GFR  (NON AFRICAN AMERICAN): 38.4 ML/MIN/1.73 M^2
EST. GFR  (NON AFRICAN AMERICAN): 51.3 ML/MIN/1.73 M^2
GLUCOSE SERPL-MCNC: 141 MG/DL
GLUCOSE SERPL-MCNC: 267 MG/DL
HCT VFR BLD AUTO: 27.7 %
HGB BLD-MCNC: 9.3 G/DL
IMM GRANULOCYTES # BLD AUTO: 0.04 K/UL
IMM GRANULOCYTES NFR BLD AUTO: 1.6 %
INR PPP: 1
LYMPHOCYTES # BLD AUTO: 0.2 K/UL
LYMPHOCYTES NFR BLD: 9.3 %
MAGNESIUM SERPL-MCNC: 2 MG/DL
MCH RBC QN AUTO: 30.5 PG
MCHC RBC AUTO-ENTMCNC: 33.6 G/DL
MCV RBC AUTO: 91 FL
MONOCYTES # BLD AUTO: 0.3 K/UL
MONOCYTES NFR BLD: 12.2 %
NEUTROPHILS # BLD AUTO: 1.9 K/UL
NEUTROPHILS NFR BLD: 76.5 %
NRBC BLD-RTO: 2 /100 WBC
PHOSPHATE SERPL-MCNC: 2.9 MG/DL
PHOSPHATE SERPL-MCNC: 3.5 MG/DL
PLATELET # BLD AUTO: 111 K/UL
PMV BLD AUTO: 10.9 FL
POCT GLUCOSE: 167 MG/DL (ref 70–110)
POCT GLUCOSE: 246 MG/DL (ref 70–110)
POCT GLUCOSE: 258 MG/DL (ref 70–110)
POCT GLUCOSE: 289 MG/DL (ref 70–110)
POTASSIUM SERPL-SCNC: 4.1 MMOL/L
POTASSIUM SERPL-SCNC: 4.3 MMOL/L
PROT SERPL-MCNC: 5.2 G/DL
PROTHROMBIN TIME: 10.5 SEC
RBC # BLD AUTO: 3.05 M/UL
SODIUM SERPL-SCNC: 133 MMOL/L
SODIUM SERPL-SCNC: 138 MMOL/L
TACROLIMUS BLD-MCNC: 1.5 NG/ML
WBC # BLD AUTO: 2.46 K/UL

## 2018-11-01 PROCEDURE — 99233 SBSQ HOSP IP/OBS HIGH 50: CPT | Mod: ,,, | Performed by: INTERNAL MEDICINE

## 2018-11-01 PROCEDURE — 63600175 PHARM REV CODE 636 W HCPCS: Performed by: PHYSICIAN ASSISTANT

## 2018-11-01 PROCEDURE — 86870 RBC ANTIBODY IDENTIFICATION: CPT

## 2018-11-01 PROCEDURE — 36415 COLL VENOUS BLD VENIPUNCTURE: CPT

## 2018-11-01 PROCEDURE — 85610 PROTHROMBIN TIME: CPT

## 2018-11-01 PROCEDURE — C9113 INJ PANTOPRAZOLE SODIUM, VIA: HCPCS | Performed by: PHYSICIAN ASSISTANT

## 2018-11-01 PROCEDURE — 25000003 PHARM REV CODE 250: Performed by: TRANSPLANT SURGERY

## 2018-11-01 PROCEDURE — 25000003 PHARM REV CODE 250: Performed by: PHYSICIAN ASSISTANT

## 2018-11-01 PROCEDURE — 80197 ASSAY OF TACROLIMUS: CPT

## 2018-11-01 PROCEDURE — 80069 RENAL FUNCTION PANEL: CPT

## 2018-11-01 PROCEDURE — 83735 ASSAY OF MAGNESIUM: CPT

## 2018-11-01 PROCEDURE — 80069 RENAL FUNCTION PANEL: CPT | Mod: 91

## 2018-11-01 PROCEDURE — 99900035 HC TECH TIME PER 15 MIN (STAT)

## 2018-11-01 PROCEDURE — 86905 BLOOD TYPING RBC ANTIGENS: CPT

## 2018-11-01 PROCEDURE — 86902 BLOOD TYPE ANTIGEN DONOR EA: CPT

## 2018-11-01 PROCEDURE — 86922 COMPATIBILITY TEST ANTIGLOB: CPT

## 2018-11-01 PROCEDURE — 25000003 PHARM REV CODE 250: Performed by: STUDENT IN AN ORGANIZED HEALTH CARE EDUCATION/TRAINING PROGRAM

## 2018-11-01 PROCEDURE — 99233 SBSQ HOSP IP/OBS HIGH 50: CPT | Mod: 24,,, | Performed by: PHYSICIAN ASSISTANT

## 2018-11-01 PROCEDURE — 86901 BLOOD TYPING SEROLOGIC RH(D): CPT

## 2018-11-01 PROCEDURE — 99232 SBSQ HOSP IP/OBS MODERATE 35: CPT | Mod: ,,, | Performed by: NURSE PRACTITIONER

## 2018-11-01 PROCEDURE — 80053 COMPREHEN METABOLIC PANEL: CPT

## 2018-11-01 PROCEDURE — 80100008 HC CRRT DAILY MAINTENANCE

## 2018-11-01 PROCEDURE — 20600001 HC STEP DOWN PRIVATE ROOM

## 2018-11-01 PROCEDURE — 94664 DEMO&/EVAL PT USE INHALER: CPT

## 2018-11-01 PROCEDURE — 90945 DIALYSIS ONE EVALUATION: CPT

## 2018-11-01 PROCEDURE — 85025 COMPLETE CBC W/AUTO DIFF WBC: CPT

## 2018-11-01 PROCEDURE — 25000003 PHARM REV CODE 250: Performed by: NURSE PRACTITIONER

## 2018-11-01 RX ORDER — INSULIN ASPART 100 [IU]/ML
8-10 INJECTION, SOLUTION INTRAVENOUS; SUBCUTANEOUS
Status: DISCONTINUED | OUTPATIENT
Start: 2018-11-01 | End: 2018-11-02

## 2018-11-01 RX ORDER — FUROSEMIDE 10 MG/ML
40 INJECTION INTRAMUSCULAR; INTRAVENOUS 2 TIMES DAILY
Status: DISCONTINUED | OUTPATIENT
Start: 2018-11-01 | End: 2018-11-03

## 2018-11-01 RX ADMIN — FUROSEMIDE 40 MG: 10 INJECTION, SOLUTION INTRAMUSCULAR; INTRAVENOUS at 05:11

## 2018-11-01 RX ADMIN — INSULIN ASPART 10 UNITS: 100 INJECTION, SOLUTION INTRAVENOUS; SUBCUTANEOUS at 06:11

## 2018-11-01 RX ADMIN — ONDANSETRON 4 MG: 2 INJECTION INTRAMUSCULAR; INTRAVENOUS at 02:11

## 2018-11-01 RX ADMIN — INSULIN ASPART 8 UNITS: 100 INJECTION, SOLUTION INTRAVENOUS; SUBCUTANEOUS at 11:11

## 2018-11-01 RX ADMIN — HYDROCORTISONE SODIUM SUCCINATE 50 MG: 100 INJECTION, POWDER, FOR SOLUTION INTRAMUSCULAR; INTRAVENOUS at 02:11

## 2018-11-01 RX ADMIN — INSULIN ASPART 3 UNITS: 100 INJECTION, SOLUTION INTRAVENOUS; SUBCUTANEOUS at 06:11

## 2018-11-01 RX ADMIN — AMOXICILLIN 500 MG: 500 CAPSULE ORAL at 08:11

## 2018-11-01 RX ADMIN — MIDODRINE HYDROCHLORIDE 15 MG: 5 TABLET ORAL at 08:11

## 2018-11-01 RX ADMIN — AMOXICILLIN 500 MG: 500 CAPSULE ORAL at 09:11

## 2018-11-01 RX ADMIN — MAGNESIUM OXIDE TAB 400 MG (241.3 MG ELEMENTAL MG) 800 MG: 400 (241.3 MG) TAB at 09:11

## 2018-11-01 RX ADMIN — ONDANSETRON 4 MG: 2 INJECTION INTRAMUSCULAR; INTRAVENOUS at 06:11

## 2018-11-01 RX ADMIN — URSODIOL 300 MG: 300 CAPSULE ORAL at 09:11

## 2018-11-01 RX ADMIN — MAGNESIUM OXIDE TAB 400 MG (241.3 MG ELEMENTAL MG) 800 MG: 400 (241.3 MG) TAB at 08:11

## 2018-11-01 RX ADMIN — URSODIOL 300 MG: 300 CAPSULE ORAL at 08:11

## 2018-11-01 RX ADMIN — FLUCONAZOLE 200 MG: 200 TABLET ORAL at 09:11

## 2018-11-01 RX ADMIN — HYDROCORTISONE SODIUM SUCCINATE 50 MG: 100 INJECTION, POWDER, FOR SOLUTION INTRAMUSCULAR; INTRAVENOUS at 08:11

## 2018-11-01 RX ADMIN — MIRTAZAPINE 7.5 MG: 7.5 TABLET ORAL at 08:11

## 2018-11-01 RX ADMIN — PANTOPRAZOLE SODIUM 40 MG: 40 INJECTION, POWDER, FOR SOLUTION INTRAVENOUS at 08:11

## 2018-11-01 RX ADMIN — LEVOFLOXACIN 250 MG: 250 TABLET, FILM COATED ORAL at 09:11

## 2018-11-01 RX ADMIN — DOCUSATE SODIUM 100 MG: 100 CAPSULE, LIQUID FILLED ORAL at 09:11

## 2018-11-01 RX ADMIN — HYDROCORTISONE SODIUM SUCCINATE 50 MG: 100 INJECTION, POWDER, FOR SOLUTION INTRAMUSCULAR; INTRAVENOUS at 06:11

## 2018-11-01 RX ADMIN — PANTOPRAZOLE SODIUM 40 MG: 40 INJECTION, POWDER, FOR SOLUTION INTRAVENOUS at 09:11

## 2018-11-01 RX ADMIN — OXYCODONE HYDROCHLORIDE 5 MG: 5 TABLET ORAL at 01:11

## 2018-11-01 RX ADMIN — MIDODRINE HYDROCHLORIDE 15 MG: 5 TABLET ORAL at 12:11

## 2018-11-01 RX ADMIN — INSULIN ASPART 1 UNITS: 100 INJECTION, SOLUTION INTRAVENOUS; SUBCUTANEOUS at 11:11

## 2018-11-01 NOTE — ASSESSMENT & PLAN NOTE
-  Cellcept/Prograf/pred.-- stopped prednisone and prograf (10/29). Will resume w/ improvements in kidney function  -Hydrocortisone started (10/29)  -  Will monitor for signs of toxic side effects, check daily troughs, and change meds accordingly.

## 2018-11-01 NOTE — PLAN OF CARE
Problem: Diabetes, Type 2 (Adult)  Goal: Signs and Symptoms of Listed Potential Problems Will be Absent, Minimized or Managed (Diabetes, Type 2)  Signs and symptoms of listed potential problems will be absent, minimized or managed by discharge/transition of care (reference Diabetes, Type 2 (Adult) CPG).  Outcome: Ongoing (interventions implemented as appropriate)  -nighttime BG was 163, no coverage needed     Problem: Fall Risk (Adult)  Goal: Absence of Falls  Patient will demonstrate the desired outcomes by discharge/transition of care.  Outcome: Ongoing (interventions implemented as appropriate)  -pt free of falls/injuries during shift  -pt up with stand by assist and uses walker  -call light within reach   -family at bedside and assists pt when needed  -bed in lowest locked position, and pt OOB with nonslip socks     Problem: Patient Care Overview  Goal: Plan of Care Review  Outcome: Ongoing (interventions implemented as appropriate)  -AAOx4  -Rt trialysis CDI  -chevron JULIANE with staples  -Rt VALORIE sites leaking, changing dressing PRN  -pt started on CRRT @ 0030 and will remain on for 8hrs, so far tolerating well   -see flowsheet for full assessment  -plan to continue to monitor

## 2018-11-01 NOTE — PROGRESS NOTES
CRRT:    Poor flow on right IJ trialysis. Blood returned, Instructed nurse to instill TPA in the catheter. To be restarted after 1 hour post cathflow..

## 2018-11-01 NOTE — ASSESSMENT & PLAN NOTE
- Diuresed w/ Lasix/albumin. Assess daily need.  - CRRT 10/14, 10/17, 10/20    -CXR 10/30: abundant fluid in b/l pleural space  -CRRT 10/31 w/ 1L removed   -Per nephrology, rec restarting Lasix 40 BID

## 2018-11-01 NOTE — ASSESSMENT & PLAN NOTE
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      - acute gradual worsening of renal function s/p OLT  - 10/06 was taken to the OR for possibility of billiary leak and post op complication due to hypercapnic hypoxic respiratory failure, no blood pressure drop intraoperatively per anesthesiology notes. Patient has not been needing vasopressors to maintain BP  - Creatinine on arrival 0.8 and has been up trending   - BUN/cr (10/11/18): 91/1.9; (10/12/18): 100/2.0; (10/13/18): 110/2.4  - Tacrolimus 26.9 (10/08); 6.8 (10/12); 5.6 (10/13), remains in none nephrotoxic levels   - Protein Creatine ratio: 0.82  - UA consistent with hematuria, proteinuria, RBC and amorphous casts   - Fena 0.5%; consistent with hypovolemia   - Renal US negative for obstruction, stricture or, hydronephrosis    Assessment: Acute Renal failure in setting of hepatic ischemia reperfusion injury and poor perfusion status vs acute tubular necrosis from immunosuppressive agents      Plan:  -SLED for uremic toxin clearance was perfromed last 10/31 , will give her time to rest today no SLED  - Recommend restarting  Lasix 40 mg IV BID    -Modified renal diet : decrease in protein , normal intake of K and phos   - avoid nephrotoxic drugs, ACEI/ARB, NSAIDS  - adjust drugs to GFR  - Agree with fluid restriction of 1500 cc/day  - Strict I/O's   This plan was discussed with patient and family at bedside using the [facility]

## 2018-11-01 NOTE — ASSESSMENT & PLAN NOTE
-Stopped Cellcept, Bactrim, and Valcyte 10/31--> will resume once WBC improves and stable  -CMV PCR pending   -Monitor

## 2018-11-01 NOTE — PROGRESS NOTES
[facility]  Nephrology  Progress Note    Patient Name: Scarlett Reddy  MRN: [id number]  Admission Date: 10/1/2018  Hospital Length of Stay: 30 days  Attending Provider: Jani Theodore MD   Primary Care Physician: Primary Doctor No  Principal Problem:Liver transplanted    Subjective:     HPI: 67 y/o female with ESLD due to Hep B and D cirrhosis MELD of 23 on 10/01/18.  Paracentesis 10/1 with 5L removed, no fluid sent for analysis. 10/02 significant for hyponatremia, Na 119 and she was admitted to LTS for sodium monitoring. On 10/05/18 she is s/p DBDLT (CMV D+/R+, steroid induction, MMF/tacro/pred maintenance) with take back on 10/6 secondary to hyperbilirubinema, no leak identified. Post-op course complicated by hypercapneic and hypoxic respiratory failure and was intubated 10/06 and extubated 10/07. Liver bx (10/6) sig for Zone 3 hemorrhage and collapse, in addition to cholestasis. ID consulted to comment on positive diphtheroid culture from ascitic fluid (likely skin colonization) as well as ESBL Klebsiella pneumoniae in urine culture (10/4).  Pt asymptomatic and cell count from ascitic fluid unremarkable. Mild peritransplant ascites- repeat US 10/6 with increased arterial resistive indices. Repeat US 10/9 w/ continued elevation in RIs and fluid collections. LFTs trending down nicely.     Patient is currently on entecavir 0.5 mg, Hepagam, mycophenalate 1000 mg BID, prednisone 20 mg and prophylaxis with Bactrim, fluconazole and Valgancyclovir.    Nephrology consulted for acute kidney failure is setting of Orthotopic Liver Transplant. Cr 0.8 on 10/05, started to trend up and 1.5 (10/09), 1.8 (10/10), and 1.9 (10/11). UOP 10/10/18: 520 ml. UA (10/09): 1+ occult blood and 1+ protein with RBC and amorphous casts.        Interval History: Per patient she feels exhausted and weak after her CRRT , she had 1 L fluid removed . Creatinine and BUN decreased, patient still making urine     Review of patient's  allergies indicates:  No Known Allergies  Current Facility-Administered Medications   Medication Frequency    0.9%  NaCl infusion (CRRT USE ONLY) Continuous    amoxicillin capsule 500 mg Q12H    bisacodyl EC tablet 10 mg Daily PRN    bisacodyl suppository 10 mg Daily PRN    calcium carbonate 200 mg calcium (500 mg) chewable tablet 500 mg TID PRN    dextrose 50% injection 12.5 g PRN    dextrose 50% injection 25 g PRN    docusate sodium capsule 100 mg Daily    entecavir tablet 0.5 mg Q72H    fluconazole tablet 200 mg Daily    glucagon (human recombinant) injection 1 mg PRN    glucose chewable tablet 16 g PRN    glucose chewable tablet 24 g PRN    hydrocortisone sodium succinate injection 50 mg Q8H    insulin aspart U-100 pen 0-5 Units QID (AC + HS) PRN    insulin aspart U-100 pen 8-10 Units TIDWM    levoFLOXacin tablet 250 mg Daily    magnesium oxide tablet 800 mg BID    midodrine tablet 15 mg TID    mirtazapine tablet 7.5 mg QHS    ondansetron disintegrating tablet 8 mg Q8H PRN    ondansetron injection 4 mg Q8H PRN    oxyCODONE immediate release tablet 5 mg Q4H PRN    oxyCODONE immediate release tablet Tab 10 mg Q4H PRN    pantoprazole injection 40 mg BID    polyethylene glycol packet 17 g Daily PRN    polyethylene glycol packet 17 g Daily    promethazine (PHENERGAN) 6.25 mg in dextrose 5 % 50 mL IVPB Q6H PRN    sodium chloride 0.9% flush 3 mL PRN    ursodiol capsule 300 mg BID       Objective:     Vital Signs (Most Recent):  Temp: 97.9 °F (36.6 °C) (11/01/18 1203)  Pulse: 97 (11/01/18 1117)  Resp: 12 (11/01/18 0830)  BP: 114/66 (11/01/18 0830)  SpO2: 100 % (11/01/18 0830)  O2 Device (Oxygen Therapy): nasal cannula (11/01/18 0830) Vital Signs (24h Range):  Temp:  [97.7 °F (36.5 °C)-97.9 °F (36.6 °C)] 97.9 °F (36.6 °C)  Pulse:  [] 97  Resp:  [8-14] 12  SpO2:  [96 %-100 %] 100 %  BP: ()/(58-74) 114/66     Weight: 74 kg (163 lb 2.3 oz) (11/01/18 0615)  Body mass index is 28.9  kg/m².  Body surface area is 1.81 meters squared.    I/O last 3 completed shifts:  In: 2420 [P.O.:1020; I.V.:1400]  Out: 4090 [Urine:1600; Other:2490]    Physical Exam   Constitutional: She is oriented to person, place, and time.   Temporal and distal extremity muscle wasting   Eyes: EOM are normal. Pupils are equal, round, and reactive to light. Scleral icterus is present.   Neck: Normal range of motion.   Cardiovascular: Normal rate and regular rhythm.   No murmur heard.  Pulmonary/Chest: Effort normal and breath sounds normal. No respiratory distress. She has no wheezes.   Abdominal: Soft. Bowel sounds are normal. She exhibits distension. There is tenderness. There is no guarding.   Chevron incision w/ staples, CDI   Musculoskeletal: Normal range of motion. She exhibits edema (3+ BLE).   Neurological: She is alert and oriented to person, place, and time.   Skin: Skin is warm and dry.   Nursing note and vitals reviewed.      Significant Labs:  CBC:   Recent Labs   Lab 11/01/18  0600   WBC 2.46*   RBC 3.05*   HGB 9.3*   HCT 27.7*   *   MCV 91   MCH 30.5   MCHC 33.6     CMP:   Recent Labs   Lab 11/01/18  0600   *  141*  141*   CALCIUM 9.1  9.1  9.1   ALBUMIN 3.0*  3.0*  3.0*   PROT 5.2*     138  138   K 4.1  4.1  4.1   CO2 23  23  23     102  102   BUN 59*  59*  59*   CREATININE 1.1  1.1  1.1   ALKPHOS 140*   ALT 21   AST 26   BILITOT 2.9*     All labs within the past 24 hours have been reviewed.     Significant Imaging:  Labs: Reviewed    Assessment/Plan:      Post LT - Acute renal failure    Post-LT EVA is multifactorial in origin and has been related to the severity of liver disease, toxicity of immunosuppressive therapy and hepatic ischaemia reperfusion injury might be a driving force in the aetiology of post-LT EVA.      - acute gradual worsening of renal function s/p OLT  - 10/06 was taken to the OR for possibility of billiary leak and post op complication due to  hypercapnic hypoxic respiratory failure, no blood pressure drop intraoperatively per anesthesiology notes. Patient has not been needing vasopressors to maintain BP  - Creatinine on arrival 0.8 and has been up trending   - BUN/cr (10/11/18): 91/1.9; (10/12/18): 100/2.0; (10/13/18): 110/2.4  - Tacrolimus 26.9 (10/08); 6.8 (10/12); 5.6 (10/13), remains in none nephrotoxic levels   - Protein Creatine ratio: 0.82  - UA consistent with hematuria, proteinuria, RBC and amorphous casts   - Fena 0.5%; consistent with hypovolemia   - Renal US negative for obstruction, stricture or, hydronephrosis    Assessment: Acute Renal failure in setting of hepatic ischemia reperfusion injury and poor perfusion status vs acute tubular necrosis from immunosuppressive agents      Plan:  -SLED for uremic toxin clearance was perfromed last 10/31 , will give her time to rest today no SLED  - Recommend restarting  Lasix 40 mg IV BID    -Modified renal diet : decrease in protein , normal intake of K and phos   - avoid nephrotoxic drugs, ACEI/ARB, NSAIDS  - adjust drugs to GFR  - Agree with fluid restriction of 1500 cc/day  - Strict I/O's   This plan was discussed with patient and family at bedside using the [facility]                                   Thank you for your consult. I will follow-up with patient. Please contact us if you have any additional questions.    Vaishali Ji MD  Nephrology  [facility]

## 2018-11-01 NOTE — PROGRESS NOTES
"[facility]  Endocrinology  Progress Note    Admit Date: 10/1/2018     Reason for Consult: Management of type 2 DM, Hyperglycemia      Surgical Procedure and Date: Liver transplant 10/5/18, Ex lap 10/6/18     Diabetes diagnosis year:      Home Diabetes Medications:  Lantus 18 units HS and novolog 17 units with meals plus correction scale (150-200 +1)        Lab Results   Component Value Date     HGBA1C 6.8 (H) 2018        How often checking glucose at home? 3-4   BG readings on regimen: 120-150.  Post prandial readings as high as upper 200s  Hypoglycemia on the regimen? yes, none recently   Missed doses on regimen?  No     Diabetes Complications include:     Diabetic peripheral neuropathy      Complicating diabetes co morbidities:   CIRRHOSIS        HPI:  Patient is a 68 y.o. female with a diagnosis of ESLD, listed for liver transplant with meld 23 who underwent live transplant on 10/5/18.  Back to OR on 10/6/18 for ex lap.  Admitted 10/1/18 for hyponatremia s/p paracentesis.  Personal has known diagnosis of diabetes, endocrine consulted for diabetes management.    Post-operative course complicated by ESBL Klebsiella pneumoniae in urine (in contact isolation), peritransplant ascites, worsening renal function (required temporary dialysis), anemia (multiple blood transfusions), and possible GI bleed.     Interval HPI:   Overnight events: Remains in TSU. BG at or slightly below goal overnight.  Hydrocortisone 50 mg q 8.  Creatinine  trending down, 1.1 today.  Eatin-75% - eating mostly foods from outside, size of meal varies  Nausea: No  Hypoglycemia and intervention: No  Fever: No  TPN and/or TF: No    /75   Pulse 98   Temp 97.9 °F (36.6 °C)   Resp (!) 21   Ht 5' 3" (1.6 m)   Wt 74 kg (163 lb 2.3 oz)   LMP  (LMP Unknown)   SpO2 97%   Breastfeeding? No   BMI 28.90 kg/m²      Labs Reviewed and Include    Recent Labs   Lab 18  0600   *  141*  141*   CALCIUM " 9.1  9.1  9.1   ALBUMIN 3.0*  3.0*  3.0*   PROT 5.2*     138  138   K 4.1  4.1  4.1   CO2 23  23  23     102  102   BUN 59*  59*  59*   CREATININE 1.1  1.1  1.1   ALKPHOS 140*   ALT 21   AST 26   BILITOT 2.9*     Lab Results   Component Value Date    WBC 2.46 (L) 11/01/2018    HGB 9.3 (L) 11/01/2018    HCT 27.7 (L) 11/01/2018    MCV 91 11/01/2018     (L) 11/01/2018     No results for input(s): TSH, FREET4 in the last 168 hours.  Lab Results   Component Value Date    HGBA1C 6.0 (H) 10/04/2018       Nutritional status:   Body mass index is 28.9 kg/m².  Lab Results   Component Value Date    ALBUMIN 3.0 (L) 11/01/2018    ALBUMIN 3.0 (L) 11/01/2018    ALBUMIN 3.0 (L) 11/01/2018     Lab Results   Component Value Date    PREALBUMIN 12 (L) 09/17/2018       Estimated Creatinine Clearance: 46.5 mL/min (based on SCr of 1.1 mg/dL).    Accu-Checks  Recent Labs     10/30/18  0807 10/30/18  1349 10/30/18  1932 10/31/18  0130 10/31/18  0815 10/31/18  1251 10/31/18  1807 10/31/18  2248 11/01/18  1001 11/01/18  1357   POCTGLUCOSE 105 175* 140* 141* 172* 197* 90 168* 167* 246*       Current Medications and/or Treatments Impacting Glycemic Control  Immunotherapy:    Immunosuppressants     None        Steroids:   Hormones (From admission, onward)    Start     Stop Route Frequency Ordered    10/31/18 1400  hydrocortisone sodium succinate injection 50 mg      -- IV Every 8 hours 10/31/18 1043    10/17/18 0945  predniSONE tablet 15 mg      10/24 0859 Oral Daily 10/17/18 0943        Pressors:    Autonomic Drugs (From admission, onward)    Start     Stop Route Frequency Ordered    10/10/18 1500  midodrine tablet 15 mg      -- Oral 3 times daily 10/10/18 1122    10/06/18 1617  rocuronium (ZEMURON) 10 mg/mL injection     Comments:  Created by cabinet override    10/07 0429   10/06/18 5284        Hyperglycemia/Diabetes Medications:   Antihyperglycemics (From admission, onward)    Start     Stop Route Frequency  Ordered    11/01/18 1645  insulin aspart U-100 pen 8-10 Units      -- SubQ 3 times daily with meals 11/01/18 1202    10/14/18 0925  insulin aspart U-100 pen 0-5 Units      -- SubQ Before meals & nightly PRN 10/14/18 0825    10/07/18 1200  insulin regular (Humulin R) 100 Units in sodium chloride 0.9% 100 mL infusion      10/13 2100 IV Continuous 10/07/18 1055          ASSESSMENT and PLAN    * Liver transplanted    Managed per primary team  Avoid hypoglycemia    Recent Labs   Lab 11/01/18  0600   ALT 21   AST 26   ALKPHOS 140*   BILITOT 2.9*   PROT 5.2*   ALBUMIN 3.0*  3.0*  3.0*            Type 2 diabetes mellitus with diabetic polyneuropathy, with long-term current use of insulin    BG goal 140-180.     Decrease Novolog to 8-10 units with meals  Low dose correction scale given kidney function.  BG monitoring AC/HS    Discharge planning:  TBD     Severe malnutrition    Oral intake encouraged, may affect BG readings     Corticosteroids adverse reaction    On steroid taper per transplant team; may elevate BG readings         Prophylactic immunotherapy    May increase insulin resistance.        EVA (acute kidney injury)    Avoid insulin stacking and hypoglycemia.  Lab Results   Component Value Date    CREATININE 1.1 11/01/2018    CREATININE 1.1 11/01/2018    CREATININE 1.1 11/01/2018            Be Willams NP  Endocrinology  [facility]

## 2018-11-01 NOTE — ASSESSMENT & PLAN NOTE
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   - POD 1 US: increased arterial resistive indices, suggestive of edema vs cute rejection vs congestion.  - Repeat US 10/9 with continued elevation of RIs.  - LFTs stable.  - T bili trending down.  - repeat liver US (10/11): elevated RIs.  -Now w/ Post LTx EVA

## 2018-11-01 NOTE — ASSESSMENT & PLAN NOTE
BG goal 140-180.     Decrease Novolog to 8-10 units with meals  Low dose correction scale given kidney function.  BG monitoring AC/HS    Discharge planning:  [place]

## 2018-11-01 NOTE — ASSESSMENT & PLAN NOTE
Avoid insulin stacking and hypoglycemia.  Lab Results   Component Value Date    CREATININE 1.1 11/01/2018    CREATININE 1.1 11/01/2018    CREATININE 1.1 11/01/2018

## 2018-11-01 NOTE — SUBJECTIVE & OBJECTIVE
Interval History: Per patient she feels exhausted and weak after her CRRT , she had 1 L fluid removed . Creatinine and BUN decreased, patient still making urine     Review of patient's allergies indicates:  No Known Allergies  Current Facility-Administered Medications   Medication Frequency    0.9%  NaCl infusion (CRRT USE ONLY) Continuous    amoxicillin capsule 500 mg Q12H    bisacodyl EC tablet 10 mg Daily PRN    bisacodyl suppository 10 mg Daily PRN    calcium carbonate 200 mg calcium (500 mg) chewable tablet 500 mg TID PRN    dextrose 50% injection 12.5 g PRN    dextrose 50% injection 25 g PRN    docusate sodium capsule 100 mg Daily    entecavir tablet 0.5 mg Q72H    fluconazole tablet 200 mg Daily    glucagon (human recombinant) injection 1 mg PRN    glucose chewable tablet 16 g PRN    glucose chewable tablet 24 g PRN    hydrocortisone sodium succinate injection 50 mg Q8H    insulin aspart U-100 pen 0-5 Units QID (AC + HS) PRN    insulin aspart U-100 pen 8-10 Units TIDWM    levoFLOXacin tablet 250 mg Daily    magnesium oxide tablet 800 mg BID    midodrine tablet 15 mg TID    mirtazapine tablet 7.5 mg QHS    ondansetron disintegrating tablet 8 mg Q8H PRN    ondansetron injection 4 mg Q8H PRN    oxyCODONE immediate release tablet 5 mg Q4H PRN    oxyCODONE immediate release tablet Tab 10 mg Q4H PRN    pantoprazole injection 40 mg BID    polyethylene glycol packet 17 g Daily PRN    polyethylene glycol packet 17 g Daily    promethazine (PHENERGAN) 6.25 mg in dextrose 5 % 50 mL IVPB Q6H PRN    sodium chloride 0.9% flush 3 mL PRN    ursodiol capsule 300 mg BID       Objective:     Vital Signs (Most Recent):  Temp: 97.9 °F (36.6 °C) (11/01/18 1203)  Pulse: 97 (11/01/18 1117)  Resp: 12 (11/01/18 0830)  BP: 114/66 (11/01/18 0830)  SpO2: 100 % (11/01/18 0830)  O2 Device (Oxygen Therapy): nasal cannula (11/01/18 0830) Vital Signs (24h Range):  Temp:  [97.7 °F (36.5 °C)-97.9 °F (36.6 °C)] 97.9  °F (36.6 °C)  Pulse:  [] 97  Resp:  [8-14] 12  SpO2:  [96 %-100 %] 100 %  BP: ()/(58-74) 114/66     Weight: 74 kg (163 lb 2.3 oz) (11/01/18 0615)  Body mass index is 28.9 kg/m².  Body surface area is 1.81 meters squared.    I/O last 3 completed shifts:  In: 2420 [P.O.:1020; I.V.:1400]  Out: 4090 [Urine:1600; Other:2490]    Physical Exam   Constitutional: She is oriented to person, place, and time.   Temporal and distal extremity muscle wasting   Eyes: EOM are normal. Pupils are equal, round, and reactive to light. Scleral icterus is present.   Neck: Normal range of motion.   Cardiovascular: Normal rate and regular rhythm.   No murmur heard.  Pulmonary/Chest: Effort normal and breath sounds normal. No respiratory distress. She has no wheezes.   Abdominal: Soft. Bowel sounds are normal. She exhibits distension. There is tenderness. There is no guarding.   Chevron incision w/ staples, CDI   Musculoskeletal: Normal range of motion. She exhibits edema (3+ BLE).   Neurological: She is alert and oriented to person, place, and time.   Skin: Skin is warm and dry.   Nursing note and vitals reviewed.      Significant Labs:  CBC:   Recent Labs   Lab 11/01/18  0600   WBC 2.46*   RBC 3.05*   HGB 9.3*   HCT 27.7*   *   MCV 91   MCH 30.5   MCHC 33.6     CMP:   Recent Labs   Lab 11/01/18  0600   *  141*  141*   CALCIUM 9.1  9.1  9.1   ALBUMIN 3.0*  3.0*  3.0*   PROT 5.2*     138  138   K 4.1  4.1  4.1   CO2 23  23  23     102  102   BUN 59*  59*  59*   CREATININE 1.1  1.1  1.1   ALKPHOS 140*   ALT 21   AST 26   BILITOT 2.9*     All labs within the past 24 hours have been reviewed.     Significant Imaging:  Labs: Reviewed

## 2018-11-01 NOTE — ASSESSMENT & PLAN NOTE
- Creatinine on arrival 0.8 and trended up.  - BUN/cr (10/11/18): 91/1.9.   - Tacrolimus 26.9 (10/08).  - Nephrology consulted.  - Renal US (10/11): no hydronephrosis.  - Echo 10/12: diastolic dysfunction, likely 2/2 to ARF.  - pt on renal diet w 1500 ml FR.  - CRRT 10/14; SLED 10/17, 10/20. --> no improvements noted in kidney fxn.  -24 hr urine creatine collection showed CrCl of 7.   -CRRT 10/31 with 1L removed. Cr and BUN decreased. Monitor  -Restarted Lasix 40 BID (11/1)

## 2018-11-01 NOTE — ASSESSMENT & PLAN NOTE
- Valcyte for CMV prophylaxis--hold (10/31) at this time for leukopenia. Pt will need weekly CMV PCR while discontinued.  - Bactrim for PCP prophylaxis ([facility]).-- hold (10/31) at this time for leukopenia  - Fluconazole for fungal prophylaxis.

## 2018-11-01 NOTE — PROGRESS NOTES
CRRT NOTES    SLED x 8hrs initiated via right ij catheter. UF rate started at 250cc/hr Hco3 at 28 and Na at 138.    Lines reversed.    If no issues made, patient will end treatment at 5:30am.    See flow sheet

## 2018-11-01 NOTE — PT/OT/SLP PROGRESS
Occupational Therapy      Patient Name:  Scarlett Reddy   MRN:  [id number]    Patient not seen today secondary to Patient fatigue. Pt reports being tired from dialysis and politely requested to hold this date. Will follow-up as scheduled.    Laura corado OT  11/1/2018

## 2018-11-01 NOTE — ASSESSMENT & PLAN NOTE
- dietary consulted.   - temporal and distal extremity muscle wasting and edema.  - encourage supplements and to increase nutritional intake .  - pt on modified renal diet-- decrease in protein, normal intake K/phos

## 2018-11-01 NOTE — SUBJECTIVE & OBJECTIVE
"Interval HPI:   Overnight events: Remains in TSU. BG at or slightly below goal overnight.  Hydrocortisone 50 mg q 8.  Creatinine trending down, 1.1 today.  Eatin-75% - eating mostly foods from outside, size of meal varies  Nausea: No  Hypoglycemia and intervention: No  Fever: No  TPN and/or TF: No    /75   Pulse 98   Temp 97.9 °F (36.6 °C)   Resp (!) 21   Ht 5' 3" (1.6 m)   Wt 74 kg (163 lb 2.3 oz)   LMP  (LMP Unknown)   SpO2 97%   Breastfeeding? No   BMI 28.90 kg/m²     Labs Reviewed and Include    Recent Labs   Lab 18  0600   *  141*  141*   CALCIUM 9.1  9.1  9.1   ALBUMIN 3.0*  3.0*  3.0*   PROT 5.2*     138  138   K 4.1  4.1  4.1   CO2 23  23  23     102  102   BUN 59*  59*  59*   CREATININE 1.1  1.1  1.1   ALKPHOS 140*   ALT 21   AST 26   BILITOT 2.9*     Lab Results   Component Value Date    WBC 2.46 (L) 2018    HGB 9.3 (L) 2018    HCT 27.7 (L) 2018    MCV 91 2018     (L) 2018     No results for input(s): TSH, FREET4 in the last 168 hours.  Lab Results   Component Value Date    HGBA1C 6.0 (H) 10/04/2018       Nutritional status:   Body mass index is 28.9 kg/m².  Lab Results   Component Value Date    ALBUMIN 3.0 (L) 2018    ALBUMIN 3.0 (L) 2018    ALBUMIN 3.0 (L) 2018     Lab Results   Component Value Date    PREALBUMIN 12 (L) 2018       Estimated Creatinine Clearance: 46.5 mL/min (based on SCr of 1.1 mg/dL).    Accu-Checks  Recent Labs     10/30/18  0807 10/30/18  1349 10/30/18  1932 10/31/18  0130 10/31/18  0815 10/31/18  1251 10/31/18  1807 10/31/18  2248 18  1001 18  1357   POCTGLUCOSE 105 175* 140* 141* 172* 197* 90 168* 167* 246*       Current Medications and/or Treatments Impacting Glycemic Control  Immunotherapy:    Immunosuppressants     None        Steroids:   Hormones (From admission, onward)    Start     Stop Route Frequency Ordered    10/31/18 1400  " hydrocortisone sodium succinate injection 50 mg      -- IV Every 8 hours 10/31/18 1043    10/17/18 0945  predniSONE tablet 15 mg      10/24 0859 Oral Daily 10/17/18 0943        Pressors:    Autonomic Drugs (From admission, onward)    Start     Stop Route Frequency Ordered    10/10/18 1500  midodrine tablet 15 mg      -- Oral 3 times daily 10/10/18 1122    10/06/18 1617  rocuronium (ZEMURON) 10 mg/mL injection     Comments:  Created by cabinet override    10/07 0429   10/06/18 1617        Hyperglycemia/Diabetes Medications:   Antihyperglycemics (From admission, onward)    Start     Stop Route Frequency Ordered    11/01/18 1645  insulin aspart U-100 pen 8-10 Units      -- SubQ 3 times daily with meals 11/01/18 1202    10/14/18 0925  insulin aspart U-100 pen 0-5 Units      -- SubQ Before meals & nightly PRN 10/14/18 0825    10/07/18 1200  insulin regular (Humulin R) 100 Units in sodium chloride 0.9% 100 mL infusion      10/13 2100 IV Continuous 10/07/18 1055

## 2018-11-01 NOTE — ASSESSMENT & PLAN NOTE
Managed per primary team  Avoid hypoglycemia    Recent Labs   Lab 11/01/18  0600   ALT 21   AST 26   ALKPHOS 140*   BILITOT 2.9*   PROT 5.2*   ALBUMIN 3.0*  3.0*  3.0*

## 2018-11-01 NOTE — ASSESSMENT & PLAN NOTE
- EGD 10/18- sig for ulcerated duodenal mucosa.   - Biopsy 10/18- no infection detected.  - Cont protonix 40 BID.  - Started H. Pylori tx 10/19: Amoxicillin/Levo. Treat x 14 days, end 11/2.

## 2018-11-01 NOTE — PROGRESS NOTES
CRRT:    Indwelled TPA aspirated, with good flow on arterial port, Venous port is still sluggish.     8-hour CRRT started. UF rate set to 350ml/hr

## 2018-11-01 NOTE — PROGRESS NOTES
[facility]  Liver Transplant  Progress Note    Patient Name: Scarlett Reddy  MRN: [id number]  Admission Date: 10/1/2018  Hospital Length of Stay: 30 days  Code Status: Full Code  Primary Care Provider: Primary Doctor No  Post-Operative Day: 27    ORGAN:   LIVER  Disease Etiology: Cirrhosis: Type B and D  Donor Type:    - Brain Death  CDC High Risk:   No  Donor CMV Status:   Donor CMV Status: Positive  Donor HBcAB:   Negative  Donor HCV Status:   Negative  Donor HBV SETH: Negative  Donor HCV SETH: Negative  Whole or Partial: Whole Liver  Biliary Anastomosis: End to End  Arterial Anatomy: Replaced Left Hepatic and Right Hepatic  Subjective:     History of Present Illness:  Ms. Reddy is a 69 y/o female with PMH of ESLD secondary Hep B and D.  Listed for liver transplant with MELD 23.  Paracentesis 10/1 with 5L removed, no fluid sent for analysis.  Morning labs significant for hyponatremia, Na 119.  Pt admitted to LTS for sodium monitoring.        Hospital Course:  Hospital Course: 69 y/o F h/o HBV/delta cirrhosis s/p DBDLT 10/5/18 (CMV D+/R+, steroid induction, MMF/tacro/pred maintenance) with take back on 10/6 2/2 hyperbilirubinema and bilious VALORIE drainage, no leak identified. Post-op course c/b hypercapneic and hypoxic respiratory failure and was reintubated though quickly extubated now doing well. Liver bx (10/6) sig for Zone 3 hemorrhage and collapse, in addition to cholestasis. Transferred from ICU on POD #4. ID consulted to comment on positive diphtheroid culture from ascitic fluid (likely skin colonization) as well as ESBL Klebsiella pneumoniae in urine culture (10/4).  Pt asymptomatic and cell count from ascitic fluid unremarkable. Per ID recs, no need to treat diphtheroids or asymptomatic ESBL Kleb pneumo bacteriuria though pt has had 6 days of therapy which would cover these organisms. Mild peritransplant ascites- repeat US 10/6 with increased arterial resistive indices. Repeat US 10/9  w/ continued elevation in RIs and fluid collections. LFTs trending down nicely.     Acute gradual worsening of renal function s/p OLTx.Creatinine on arrival 0.8 and has been up trending. Nephrology consulted for post-op EVA. Cr cont to trend up but remains to have good UOP. Had stable response to lasiz diuresis.  BUN elevated requiring multiple rounds of dialysis (10/15, 10/17, 10/20, 10/31). No significant improvements in kidney function noted.   In addition, pt H/H cont to fluctuate requiring multiple blood transfusions (10/14, 10/16, 10/18, 10/19, 10/22, 10/27, 10/29).  Pt reported dark stools 10/15 but color has normalized. FOBT+. EGD 10/18 consistent with ulcerated mucosa in duodenum s/p coagulation. Concern for H. Pylori. Started Amoxicillin/levofloxacin (10/19, complete 11/2)). Remains on Protonix 40 mg BID. Biopsy w/ no concerned findings. Will cont close monitoring H/H, transfuse as needed with goal Hb > 7.0.  Of note, Urine cx + Klebsiella pneumoniae (10/13). Received 3 day course of ciprofloxacin, dc'd 10/19.   On 10/19 pm pt c/o crushing chest pain. Troponin 0.094, likely 2/2 ischemic stress. EKG NSR.     Interval history: Pt feeling a little weak and fatigued after CRRT overnight. 1L fluid removed. Cr and BUN decreased.  Restarted Lasix 40 BID. Pt remains leukopenic. Cellcept, bactrim, valcyte, and prograf held at this time. Cont hydrocortisone. CMV PCR pending.  H/H stable. Pt denies melena. +BM.  Cont to hold heparin for possible ongoing GI bleed. Monitor.    Scheduled Meds:   amoxicillin  500 mg Oral Q12H    docusate sodium  100 mg Oral Daily    entecavir  0.5 mg Oral Q72H    fluconazole  200 mg Oral Daily    hydrocortisone sodium succinate  50 mg Intravenous Q8H    insulin aspart U-100  8-10 Units Subcutaneous TIDWM    levoFLOXacin  250 mg Oral Daily    magnesium oxide  800 mg Oral BID    midodrine  15 mg Oral TID    mirtazapine  7.5 mg Oral QHS    pantoprazole  40 mg Intravenous BID     polyethylene glycol  17 g Oral Daily    ursodiol  300 mg Oral BID     Continuous Infusions:   sodium chloride 0.9% 1,000 mL (10/31/18 2123)     PRN Meds:bisacodyl, bisacodyl, calcium carbonate, dextrose 50%, dextrose 50%, glucagon (human recombinant), glucose, glucose, insulin aspart U-100, ondansetron, ondansetron, oxyCODONE, oxyCODONE, polyethylene glycol, promethazine (PHENERGAN) IVPB, sodium chloride 0.9%    Review of Systems   Constitutional: Positive for activity change, appetite change (improving) and fatigue. Negative for fever.   Respiratory: Negative for cough and shortness of breath.    Cardiovascular: Positive for leg swelling. Negative for chest pain.   Gastrointestinal: Positive for abdominal distention and abdominal pain. Negative for constipation, diarrhea, nausea and vomiting.   Genitourinary: Negative for decreased urine volume, difficulty urinating and dysuria.   Musculoskeletal: Negative for arthralgias and back pain.   Allergic/Immunologic: Positive for immunocompromised state.   Neurological: Positive for weakness. Negative for tremors and headaches.   Psychiatric/Behavioral: Positive for sleep disturbance.     Objective:     Vital Signs (Most Recent):  Temp: 97.9 °F (36.6 °C) (11/01/18 1203)  Pulse: 97 (11/01/18 1117)  Resp: 12 (11/01/18 0830)  BP: 114/66 (11/01/18 0830)  SpO2: 100 % (11/01/18 0830) Vital Signs (24h Range):  Temp:  [97.7 °F (36.5 °C)-97.9 °F (36.6 °C)] 97.9 °F (36.6 °C)  Pulse:  [] 97  Resp:  [8-14] 12  SpO2:  [96 %-100 %] 100 %  BP: ()/(58-74) 114/66     Weight: 74 kg (163 lb 2.3 oz)  Body mass index is 28.9 kg/m².    Intake/Output - Last 3 Shifts       10/30 0700 - 10/31 0659 10/31 0700 - 11/01 0659 11/01 0700 - 11/02 0659    P.O. 1320 660 180    I.V. (mL/kg)  1200 (16.2) 600 (8.1)    Blood       Total Intake(mL/kg) 1320 (18) 1860 (25.1) 780 (10.5)    Urine (mL/kg/hr) 1150 (0.7) 800 (0.5)     Other  2083 848    Stool 0 0     Total Output 1150 0243 848    Net  +170 -1023 -68           Urine Occurrence 1 x      Stool Occurrence 4 x 1 x           Physical Exam   Constitutional: She is oriented to person, place, and time.   Temporal and distal extremity muscle wasting   Eyes: EOM are normal. Pupils are equal, round, and reactive to light. Scleral icterus is present.   Neck: Normal range of motion.   Cardiovascular: Normal rate and regular rhythm.   No murmur heard.  Pulmonary/Chest: Effort normal and breath sounds normal. No respiratory distress. She has no wheezes.   Abdominal: Soft. Bowel sounds are normal. She exhibits distension. There is tenderness. There is no guarding.   Chevron incision w/ staples, CDI   Musculoskeletal: Normal range of motion. She exhibits edema (3+ BLE).   Neurological: She is alert and oriented to person, place, and time.   Skin: Skin is warm and dry.   Nursing note and vitals reviewed.      Laboratory:  Immunosuppressants     None        CBC:   Recent Labs   Lab 11/01/18  0600   WBC 2.46*   RBC 3.05*   HGB 9.3*   HCT 27.7*   *   MCV 91   MCH 30.5   MCHC 33.6     CMP:   Recent Labs   Lab 11/01/18  0600   *  141*  141*   CALCIUM 9.1  9.1  9.1   ALBUMIN 3.0*  3.0*  3.0*   PROT 5.2*     138  138   K 4.1  4.1  4.1   CO2 23  23  23     102  102   BUN 59*  59*  59*   CREATININE 1.1  1.1  1.1   ALKPHOS 140*   ALT 21   AST 26   BILITOT 2.9*     Labs within the past 24 hours have been reviewed.    Diagnostic Results:  I have personally reviewed all pertinent imaging studies.    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   - POD 1 US: increased arterial resistive indices, suggestive of edema vs cute rejection vs congestion.  - Repeat US 10/9 with continued elevation of RIs.  - LFTs stable.  - T bili trending down.  - repeat liver US (10/11):  elevated RIs.  -Now w/ Post LTx EVA           Leukopenia    -Stopped Cellcept, Bactrim, and Valcyte 10/31--> will resume once WBC improves and stable  -CMV PCR pending   -Monitor        Azotemia    CRRT 10/31 for uremic toxin clearance  BUN decreased  Monitor        Duodenal ulcer    - EGD 10/18- sig for ulcerated duodenal mucosa.   - Biopsy 10/18- no infection detected.  - Cont protonix 40 BID.  - Started H. Pylori tx 10/19: Amoxicillin/Levo. Treat x 14 days, end 11/2.       UTI (urinary tract infection)    - Urine Cx 10/15 w/ Klebsiella pneumonia.  - Started Ciprofloxacin 10/17. Dc'd 10/19. Coverage w/ triple therapy for h. Pylori.   -Abx complete 11/2  - Pt asymptomatic at this time.       Acute blood loss anemia    - H&H cont to fluctuate.   - FOBT +  - EGD 10/18- showed diffuse bleeding with ulcerated duodenal mucosa   - Monitoring w/ CBC q 12 to assess the need for transfusion.  - 1 unit PRBCs transfused 10/27. H&H stable today.            Post LT - Acute renal failure    - Creatinine on arrival 0.8 and trended up.  - BUN/cr (10/11/18): 91/1.9.   - Tacrolimus 26.9 (10/08).  - Nephrology consulted.  - Renal US (10/11): no hydronephrosis.  - Echo 10/12: diastolic dysfunction, likely 2/2 to ARF.  - pt on renal diet w 1500 ml FR.  - CRRT 10/14; SLED 10/17, 10/20. --> no improvements noted in kidney fxn.  -24 hr urine creatine collection showed CrCl of 7.   -CRRT 10/31 with 1L removed. Cr and BUN decreased. Monitor  -Restarted Lasix 40 BID (11/1)     Hypervolemia associated with renal insufficiency    - Diuresed w/ Lasix/albumin. Assess daily need.  - CRRT 10/14, 10/17, 10/20    -CXR 10/30: abundant fluid in b/l pleural space  -CRRT 10/31 w/ 1L removed   -Per nephrology, rec restarting Lasix 40 BID       EVA (acute kidney injury)           Metabolic acidosis    - CRRT 10/14.  - SLED 10/17, 10/20, 10/31         At risk for opportunistic infections    - Valcyte for CMV prophylaxis--hold (10/31) at this time for  leukopenia. Pt will need weekly CMV PCR while discontinued.  - Bactrim for PCP prophylaxis (MW).-- hold (10/31) at this time for leukopenia  - Fluconazole for fungal prophylaxis.        Long-term use of immunosuppressant medication    -  Cellcept/Prograf/pred.-- stopped prednisone and prograf (10/29). Will resume w/ improvements in kidney function  -Hydrocortisone started (10/29)  -  Will monitor for signs of toxic side effects, check daily troughs, and change meds accordingly.       Prophylactic immunotherapy    - See long term use immunosuppresion.       Hypokalemia           Ascites    - paracentesis 10/1, 5L removed, no fluid sent for analysis.       Weakness    - see physical decond.       Physical deconditioning    - PT/OT consulted.  - Recommend home health PT and rolling walker at discharge.       Hyponatremia    - Na 119 on admit.  - Na was improving post transplant w HD.  - Na stable at this time.             Hypotension    - continue Midodrine 15 mg TID. Monitor.       Anemia requiring transfusions    - Fluctuating H/H.    - Transfusions: 10/14; 10/16; 10/18; 10/19; 10/22; 10/27; 10/29  - FOBT +  - LDH elevated, Hapto < 10.  - EGD 10/18: ulcerated duodenal mucosa.   - Monitoring w/ CBC q12hr to assess the need for transfusion.  - H/H stable       Severe malnutrition    - dietary consulted.   - temporal and distal extremity muscle wasting and edema.  - encourage supplements and to increase nutritional intake .  - pt on modified renal diet-- decrease in protein, normal intake K/phos     Type 2 diabetes mellitus with diabetic polyneuropathy, with long-term current use of insulin    - continue home regimen.  - endocrine consulted.  - Pt off insulin drip at this time.  Apprec endo recs.        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  -Tx w/ Entecavir- dose changed to q72 hours given renal function.             VTE Risk Mitigation (From admission, onward)        Ordered      Place sequential compression device  Until discontinued      10/05/18 0709          The patients clinical status was discussed at multidisplinary rounds, involving transplant surgery, transplant medicine, pharmacy, nursing, nutrition, and social work    Discharge Planning: Not a candidate at this time      Jihan Soares PA-C  Liver Transplant  [facility]

## 2018-11-01 NOTE — ASSESSMENT & PLAN NOTE
- Fluctuating H/H.    - Transfusions: 10/14; 10/16; 10/18; 10/19; 10/22; 10/27; 10/29  - FOBT +  - LDH elevated, Hapto < 10.  - EGD 10/18: ulcerated duodenal mucosa.   - Monitoring w/ CBC q12hr to assess the need for transfusion.  - H/H stable

## 2018-11-01 NOTE — PLAN OF CARE
Pt is AAOx4; afebrile; vital signs stable. She is now on room air. She is up with one assist.  this morning, but she did not eat breakfast until nearly noon. BG at lunch was 245, but it was only ~2 hours after eating, so no coverage given. Before dinner, BG was 256.  was concerned that BG was so high and that I was waiting to see how much she ate rather than giving insulin prior to eating.  called and the new insulin orders were explained (which vary according to the amount she eats). Incision leaking moderate amounts of clear yellow drainage; dressing changed twice. She had 2 loose BMs. Accurate I/Os not done (due to BMs), but she has been encouraged to measure urine when possible. She is up with one assist.

## 2018-11-01 NOTE — PT/OT/SLP PROGRESS
Physical Therapy      Patient Name:  Scarlett Reddy   MRN:  [id number]    Patient not seen today secondary to Patient fatigue(Pt fatigued this AM 2* CRRT overnight, per RN). PT unable to return for later attempt. Will follow-up at next scheduled session as able.    Radha Nguyen, PT, DPT   11/1/2018  [phone]

## 2018-11-01 NOTE — SUBJECTIVE & OBJECTIVE
Scheduled Meds:   amoxicillin  500 mg Oral Q12H    docusate sodium  100 mg Oral Daily    entecavir  0.5 mg Oral Q72H    fluconazole  200 mg Oral Daily    hydrocortisone sodium succinate  50 mg Intravenous Q8H    insulin aspart U-100  8-10 Units Subcutaneous TIDWM    levoFLOXacin  250 mg Oral Daily    magnesium oxide  800 mg Oral BID    midodrine  15 mg Oral TID    mirtazapine  7.5 mg Oral QHS    pantoprazole  40 mg Intravenous BID    polyethylene glycol  17 g Oral Daily    ursodiol  300 mg Oral BID     Continuous Infusions:   sodium chloride 0.9% 1,000 mL (10/31/18 2123)     PRN Meds:bisacodyl, bisacodyl, calcium carbonate, dextrose 50%, dextrose 50%, glucagon (human recombinant), glucose, glucose, insulin aspart U-100, ondansetron, ondansetron, oxyCODONE, oxyCODONE, polyethylene glycol, promethazine (PHENERGAN) IVPB, sodium chloride 0.9%    Review of Systems   Constitutional: Positive for activity change, appetite change (improving) and fatigue. Negative for fever.   Respiratory: Negative for cough and shortness of breath.    Cardiovascular: Positive for leg swelling. Negative for chest pain.   Gastrointestinal: Positive for abdominal distention and abdominal pain. Negative for constipation, diarrhea, nausea and vomiting.   Genitourinary: Negative for decreased urine volume, difficulty urinating and dysuria.   Musculoskeletal: Negative for arthralgias and back pain.   Allergic/Immunologic: Positive for immunocompromised state.   Neurological: Positive for weakness. Negative for tremors and headaches.   Psychiatric/Behavioral: Positive for sleep disturbance.     Objective:     Vital Signs (Most Recent):  Temp: 97.9 °F (36.6 °C) (11/01/18 1203)  Pulse: 97 (11/01/18 1117)  Resp: 12 (11/01/18 0830)  BP: 114/66 (11/01/18 0830)  SpO2: 100 % (11/01/18 0830) Vital Signs (24h Range):  Temp:  [97.7 °F (36.5 °C)-97.9 °F (36.6 °C)] 97.9 °F (36.6 °C)  Pulse:  [] 97  Resp:  [8-14] 12  SpO2:  [96 %-100 %] 100  %  BP: ()/(58-74) 114/66     Weight: 74 kg (163 lb 2.3 oz)  Body mass index is 28.9 kg/m².    Intake/Output - Last 3 Shifts       10/30 0700 - 10/31 0659 10/31 0700 - 11/01 0659 11/01 0700 - 11/02 0659    P.O. 1320 660 180    I.V. (mL/kg)  1200 (16.2) 600 (8.1)    Blood       Total Intake(mL/kg) 1320 (18) 1860 (25.1) 780 (10.5)    Urine (mL/kg/hr) 1150 (0.7) 800 (0.5)     Other  2083 848    Stool 0 0     Total Output 1150 2883 848    Net +170 -1023 -68           Urine Occurrence 1 x      Stool Occurrence 4 x 1 x           Physical Exam   Constitutional: She is oriented to person, place, and time.   Temporal and distal extremity muscle wasting   Eyes: EOM are normal. Pupils are equal, round, and reactive to light. Scleral icterus is present.   Neck: Normal range of motion.   Cardiovascular: Normal rate and regular rhythm.   No murmur heard.  Pulmonary/Chest: Effort normal and breath sounds normal. No respiratory distress. She has no wheezes.   Abdominal: Soft. Bowel sounds are normal. She exhibits distension. There is tenderness. There is no guarding.   Chevron incision w/ staples, CDI   Musculoskeletal: Normal range of motion. She exhibits edema (3+ BLE).   Neurological: She is alert and oriented to person, place, and time.   Skin: Skin is warm and dry.   Nursing note and vitals reviewed.      Laboratory:  Immunosuppressants     None        CBC:   Recent Labs   Lab 11/01/18  0600   WBC 2.46*   RBC 3.05*   HGB 9.3*   HCT 27.7*   *   MCV 91   MCH 30.5   MCHC 33.6     CMP:   Recent Labs   Lab 11/01/18  0600   *  141*  141*   CALCIUM 9.1  9.1  9.1   ALBUMIN 3.0*  3.0*  3.0*   PROT 5.2*     138  138   K 4.1  4.1  4.1   CO2 23  23  23     102  102   BUN 59*  59*  59*   CREATININE 1.1  1.1  1.1   ALKPHOS 140*   ALT 21   AST 26   BILITOT 2.9*     Labs within the past 24 hours have been reviewed.    Diagnostic Results:  I have personally reviewed all pertinent imaging  studies.

## 2018-11-01 NOTE — PROGRESS NOTES
Update:    SW presented to patient's room and patient was observed in room alone. TIMBO contacted patient's , Dunia # [phone],  to provide some information on patient, as patient speaks Romansh. Patient was observed lying in bed, AAOx4. Dunia reported that patient's daughter, Gabriela, leaves on 11/8/18, and at this time there is no additional caregiver returning. Dunia reported that there will be a meeting to discuss individual in the community to come assist patient and patient's caregiver/spouse. Dunia requested that TIMBO follow-up with her the next day to find out results of meeting and caregiver plan. Dunia reported that the patient was coping adequately at this time. Patient deny any current mental health difficulties. TIMBO will continue to follow patient for resources, education, support and dc planning as appropriate.SW remains available.

## 2018-11-02 LAB
ALBUMIN SERPL BCP-MCNC: 2.5 G/DL
ALP SERPL-CCNC: 113 U/L
ALT SERPL W/O P-5'-P-CCNC: 17 U/L
ANION GAP SERPL CALC-SCNC: 10 MMOL/L
ANISOCYTOSIS BLD QL SMEAR: SLIGHT
AST SERPL-CCNC: 19 U/L
BASO STIPL BLD QL SMEAR: ABNORMAL
BASOPHILS # BLD AUTO: ABNORMAL K/UL
BASOPHILS NFR BLD: 0 %
BILIRUB SERPL-MCNC: 2.2 MG/DL
BUN SERPL-MCNC: 70 MG/DL
CALCIUM SERPL-MCNC: 8.5 MG/DL
CHLORIDE SERPL-SCNC: 100 MMOL/L
CMV DNA SERPL NAA+PROBE-ACNC: 129 IU/ML
CO2 SERPL-SCNC: 23 MMOL/L
CREAT SERPL-MCNC: 1.6 MG/DL
DIFFERENTIAL METHOD: ABNORMAL
EOSINOPHIL # BLD AUTO: ABNORMAL K/UL
EOSINOPHIL NFR BLD: 1 %
ERYTHROCYTE [DISTWIDTH] IN BLOOD BY AUTOMATED COUNT: 18.7 %
EST. GFR  (AFRICAN AMERICAN): 37.6 ML/MIN/1.73 M^2
EST. GFR  (NON AFRICAN AMERICAN): 32.6 ML/MIN/1.73 M^2
GLUCOSE SERPL-MCNC: 248 MG/DL
HCT VFR BLD AUTO: 22.8 %
HCT VFR BLD AUTO: 24.3 %
HGB BLD-MCNC: 7.3 G/DL
HGB BLD-MCNC: 7.7 G/DL
HYPOCHROMIA BLD QL SMEAR: ABNORMAL
IMM GRANULOCYTES # BLD AUTO: ABNORMAL K/UL
IMM GRANULOCYTES NFR BLD AUTO: ABNORMAL %
INR PPP: 1.1
LYMPHOCYTES # BLD AUTO: ABNORMAL K/UL
LYMPHOCYTES NFR BLD: 4 %
MAGNESIUM SERPL-MCNC: 2 MG/DL
MCH RBC QN AUTO: 30.5 PG
MCHC RBC AUTO-ENTMCNC: 32 G/DL
MCV RBC AUTO: 95 FL
MONOCYTES # BLD AUTO: ABNORMAL K/UL
MONOCYTES NFR BLD: 11 %
NEUTROPHILS NFR BLD: 84 %
NRBC BLD-RTO: 0 /100 WBC
PHOSPHATE SERPL-MCNC: 3.8 MG/DL
PLATELET # BLD AUTO: 74 K/UL
PLATELET BLD QL SMEAR: ABNORMAL
PMV BLD AUTO: 11.5 FL
POCT GLUCOSE: 187 MG/DL (ref 70–110)
POCT GLUCOSE: 195 MG/DL (ref 70–110)
POCT GLUCOSE: 258 MG/DL (ref 70–110)
POIKILOCYTOSIS BLD QL SMEAR: ABNORMAL
POLYCHROMASIA BLD QL SMEAR: ABNORMAL
POTASSIUM SERPL-SCNC: 4.1 MMOL/L
PROT SERPL-MCNC: 4.2 G/DL
PROTHROMBIN TIME: 11.1 SEC
RBC # BLD AUTO: 2.39 M/UL
SODIUM SERPL-SCNC: 133 MMOL/L
TACROLIMUS BLD-MCNC: <1.5 NG/ML
WBC # BLD AUTO: 1.84 K/UL

## 2018-11-02 PROCEDURE — 94799 UNLISTED PULMONARY SVC/PX: CPT

## 2018-11-02 PROCEDURE — 97116 GAIT TRAINING THERAPY: CPT

## 2018-11-02 PROCEDURE — 85027 COMPLETE CBC AUTOMATED: CPT

## 2018-11-02 PROCEDURE — 83735 ASSAY OF MAGNESIUM: CPT

## 2018-11-02 PROCEDURE — 20600001 HC STEP DOWN PRIVATE ROOM

## 2018-11-02 PROCEDURE — C9113 INJ PANTOPRAZOLE SODIUM, VIA: HCPCS | Performed by: PHYSICIAN ASSISTANT

## 2018-11-02 PROCEDURE — 80053 COMPREHEN METABOLIC PANEL: CPT

## 2018-11-02 PROCEDURE — 85610 PROTHROMBIN TIME: CPT

## 2018-11-02 PROCEDURE — 63600175 PHARM REV CODE 636 W HCPCS: Performed by: PHYSICIAN ASSISTANT

## 2018-11-02 PROCEDURE — 25000003 PHARM REV CODE 250: Performed by: TRANSPLANT SURGERY

## 2018-11-02 PROCEDURE — 85007 BL SMEAR W/DIFF WBC COUNT: CPT

## 2018-11-02 PROCEDURE — 99900035 HC TECH TIME PER 15 MIN (STAT)

## 2018-11-02 PROCEDURE — 94664 DEMO&/EVAL PT USE INHALER: CPT

## 2018-11-02 PROCEDURE — 85014 HEMATOCRIT: CPT

## 2018-11-02 PROCEDURE — 80197 ASSAY OF TACROLIMUS: CPT

## 2018-11-02 PROCEDURE — 99233 SBSQ HOSP IP/OBS HIGH 50: CPT | Mod: 24,,, | Performed by: PHYSICIAN ASSISTANT

## 2018-11-02 PROCEDURE — 99233 SBSQ HOSP IP/OBS HIGH 50: CPT | Mod: ,,, | Performed by: INTERNAL MEDICINE

## 2018-11-02 PROCEDURE — 25000003 PHARM REV CODE 250: Performed by: NURSE PRACTITIONER

## 2018-11-02 PROCEDURE — 94761 N-INVAS EAR/PLS OXIMETRY MLT: CPT

## 2018-11-02 PROCEDURE — 25000003 PHARM REV CODE 250: Performed by: PHYSICIAN ASSISTANT

## 2018-11-02 PROCEDURE — 84100 ASSAY OF PHOSPHORUS: CPT

## 2018-11-02 PROCEDURE — 85018 HEMOGLOBIN: CPT

## 2018-11-02 RX ORDER — TACROLIMUS 0.5 MG/1
0.5 CAPSULE ORAL 2 TIMES DAILY
Status: DISCONTINUED | OUTPATIENT
Start: 2018-11-02 | End: 2018-11-03

## 2018-11-02 RX ORDER — INSULIN ASPART 100 [IU]/ML
10-12 INJECTION, SOLUTION INTRAVENOUS; SUBCUTANEOUS
Status: DISCONTINUED | OUTPATIENT
Start: 2018-11-02 | End: 2018-11-05

## 2018-11-02 RX ADMIN — MIDODRINE HYDROCHLORIDE 15 MG: 5 TABLET ORAL at 08:11

## 2018-11-02 RX ADMIN — MIRTAZAPINE 7.5 MG: 7.5 TABLET ORAL at 08:11

## 2018-11-02 RX ADMIN — LEVOFLOXACIN 250 MG: 250 TABLET, FILM COATED ORAL at 08:11

## 2018-11-02 RX ADMIN — INSULIN ASPART 12 UNITS: 100 INJECTION, SOLUTION INTRAVENOUS; SUBCUTANEOUS at 09:11

## 2018-11-02 RX ADMIN — PANTOPRAZOLE SODIUM 40 MG: 40 INJECTION, POWDER, FOR SOLUTION INTRAVENOUS at 08:11

## 2018-11-02 RX ADMIN — MAGNESIUM OXIDE TAB 400 MG (241.3 MG ELEMENTAL MG) 800 MG: 400 (241.3 MG) TAB at 08:11

## 2018-11-02 RX ADMIN — URSODIOL 300 MG: 300 CAPSULE ORAL at 08:11

## 2018-11-02 RX ADMIN — TACROLIMUS 0.5 MG: 0.5 CAPSULE ORAL at 06:11

## 2018-11-02 RX ADMIN — AMOXICILLIN 500 MG: 500 CAPSULE ORAL at 08:11

## 2018-11-02 RX ADMIN — ENTECAVIR 0.5 MG: 0.5 TABLET ORAL at 08:11

## 2018-11-02 RX ADMIN — TACROLIMUS 0.5 MG: 0.5 CAPSULE ORAL at 10:11

## 2018-11-02 RX ADMIN — INSULIN ASPART 3 UNITS: 100 INJECTION, SOLUTION INTRAVENOUS; SUBCUTANEOUS at 09:11

## 2018-11-02 RX ADMIN — FUROSEMIDE 40 MG: 10 INJECTION, SOLUTION INTRAMUSCULAR; INTRAVENOUS at 06:11

## 2018-11-02 RX ADMIN — INSULIN ASPART 10 UNITS: 100 INJECTION, SOLUTION INTRAVENOUS; SUBCUTANEOUS at 07:11

## 2018-11-02 RX ADMIN — INSULIN ASPART 12 UNITS: 100 INJECTION, SOLUTION INTRAVENOUS; SUBCUTANEOUS at 03:11

## 2018-11-02 RX ADMIN — HYDROCORTISONE SODIUM SUCCINATE 50 MG: 100 INJECTION, POWDER, FOR SOLUTION INTRAMUSCULAR; INTRAVENOUS at 05:11

## 2018-11-02 RX ADMIN — MIDODRINE HYDROCHLORIDE 15 MG: 5 TABLET ORAL at 03:11

## 2018-11-02 RX ADMIN — HYDROCORTISONE SODIUM SUCCINATE 50 MG: 100 INJECTION, POWDER, FOR SOLUTION INTRAMUSCULAR; INTRAVENOUS at 03:11

## 2018-11-02 RX ADMIN — FLUCONAZOLE 200 MG: 200 TABLET ORAL at 08:11

## 2018-11-02 RX ADMIN — HYDROCORTISONE SODIUM SUCCINATE 50 MG: 100 INJECTION, POWDER, FOR SOLUTION INTRAMUSCULAR; INTRAVENOUS at 08:11

## 2018-11-02 RX ADMIN — HEPATITIS B IMMUNE GLOBULIN (HUMAN) 9999.91 UNITS: 312 INJECTION, SOLUTION INTRAMUSCULAR; INTRAVENOUS at 11:11

## 2018-11-02 RX ADMIN — FUROSEMIDE 40 MG: 10 INJECTION, SOLUTION INTRAMUSCULAR; INTRAVENOUS at 08:11

## 2018-11-02 NOTE — SUBJECTIVE & OBJECTIVE
"Interval HPI:   Overnight events: Remains in TSU. BG elevated yesterday.  Hydrocortisone 50 mg q 8.  Creatinine trending up, 1.6 today.  Eating:   {Blank single:06480::"100%","50%","<25%","NPO"}  Nausea: No  Hypoglycemia and intervention: No  Fever: No  TPN and/or TF: No    /82   Pulse 90   Temp 97.5 °F (36.4 °C) (Oral)   Resp 13   Ht 5' 3" (1.6 m)   Wt 74.5 kg (164 lb 3.9 oz)   LMP  (LMP Unknown)   SpO2 97%   Breastfeeding? No   BMI 29.09 kg/m²     Labs Reviewed and Include    Recent Labs   Lab 11/01/18  1704   *   CALCIUM 8.3*   ALBUMIN 2.6*   *   K 4.3   CO2 21*      BUN 65*   CREATININE 1.4     Lab Results   Component Value Date    WBC 2.46 (L) 11/01/2018    HGB 9.3 (L) 11/01/2018    HCT 27.7 (L) 11/01/2018    MCV 91 11/01/2018     (L) 11/01/2018     No results for input(s): TSH, FREET4 in the last 168 hours.  Lab Results   Component Value Date    HGBA1C 6.0 (H) 10/04/2018       Nutritional status:   Body mass index is 29.09 kg/m².  Lab Results   Component Value Date    ALBUMIN 2.6 (L) 11/01/2018    ALBUMIN 3.0 (L) 11/01/2018    ALBUMIN 3.0 (L) 11/01/2018    ALBUMIN 3.0 (L) 11/01/2018     Lab Results   Component Value Date    PREALBUMIN 12 (L) 09/17/2018       Estimated Creatinine Clearance: 36.6 mL/min (based on SCr of 1.4 mg/dL).    Accu-Checks  Recent Labs     10/30/18  1932 10/31/18  0130 10/31/18  0815 10/31/18  1251 10/31/18  1807 10/31/18  2248 11/01/18  1001 11/01/18  1357 11/01/18  1806 11/01/18  2315   POCTGLUCOSE 140* 141* 172* 197* 90 168* 167* 246* 258* 289*       Current Medications and/or Treatments Impacting Glycemic Control  Immunotherapy:    Immunosuppressants     None        Steroids:   Hormones (From admission, onward)    Start     Stop Route Frequency Ordered    10/31/18 1400  hydrocortisone sodium succinate injection 50 mg      -- IV Every 8 hours 10/31/18 1043    10/17/18 0945  predniSONE tablet 15 mg      10/24 0859 Oral Daily 10/17/18 0943    "     Pressors:    Autonomic Drugs (From admission, onward)    Start     Stop Route Frequency Ordered    10/10/18 1500  midodrine tablet 15 mg      -- Oral 3 times daily 10/10/18 1122    10/06/18 1617  rocuronium (ZEMURON) 10 mg/mL injection     Comments:  Created by cabinet override    10/07 0429   10/06/18 1617        Hyperglycemia/Diabetes Medications:   Antihyperglycemics (From admission, onward)    Start     Stop Route Frequency Ordered    11/01/18 1645  insulin aspart U-100 pen 8-10 Units      -- SubQ 3 times daily with meals 11/01/18 1202    10/14/18 0925  insulin aspart U-100 pen 0-5 Units      -- SubQ Before meals & nightly PRN 10/14/18 0825    10/07/18 1200  insulin regular (Humulin R) 100 Units in sodium chloride 0.9% 100 mL infusion      10/13 2100 IV Continuous 10/07/18 1053

## 2018-11-02 NOTE — PLAN OF CARE
Pt is AAOx4; afebrile; vital signs stable. BG ranged from 258-195. Right side of chevron incision leaking moderate amounts of serous fluid. She is up with one assist and a walker. Accurate I&Os kept. She denies pain or nausea today. She had 2 soft BMs today. They was no visible blood and they were light brown. Plan for EGD on Monday.

## 2018-11-02 NOTE — ASSESSMENT & PLAN NOTE
- Valcyte for CMV prophylaxis--> holding (10/31) for leukopenia. Pt will need weekly CMV PCR while discontinued.  - Bactrim for PCP prophylaxis ([facility]).--> holding (10/31) for leukopenia  - Fluconazole for fungal prophylaxis.

## 2018-11-02 NOTE — PROGRESS NOTES
[facility]  Liver Transplant  Progress Note    Patient Name: Scarlett Reddy  MRN: [id number]  Admission Date: 10/1/2018  Hospital Length of Stay: 31 days  Code Status: Full Code  Primary Care Provider: Primary Doctor No  Post-Operative Day: 28    ORGAN:   LIVER  Disease Etiology: Cirrhosis: Type B and D  Donor Type:    - Brain Death  CDC High Risk:   No  Donor CMV Status:   Donor CMV Status: Positive  Donor HBcAB:   Negative  Donor HCV Status:   Negative  Donor HBV SETH: Negative  Donor HCV SETH: Negative  Whole or Partial: Whole Liver  Biliary Anastomosis: End to End  Arterial Anatomy: Replaced Left Hepatic and Right Hepatic  Subjective:     History of Present Illness:  Ms. Reddy is a 69 y/o female with PMH of ESLD secondary Hep B and D.  Listed for liver transplant with MELD 23.  Paracentesis 10/1 with 5L removed, no fluid sent for analysis.  Morning labs significant for hyponatremia, Na 119.  Pt admitted to LTS for sodium monitoring.        Hospital Course:  Hospital Course: 69 y/o F h/o HBV/delta cirrhosis s/p DBDLT 10/5/18 (CMV D+/R+, steroid induction, MMF/tacro/pred maintenance) with take back on 10/6 2/2 hyperbilirubinema and bilious VALORIE drainage, no leak identified. Post-op course c/b hypercapneic and hypoxic respiratory failure and was reintubated though quickly extubated now doing well. Liver bx (10/6) sig for Zone 3 hemorrhage and collapse, in addition to cholestasis. Transferred from ICU on POD #4. ID consulted to comment on positive diphtheroid culture from ascitic fluid (likely skin colonization) as well as ESBL Klebsiella pneumoniae in urine culture (10/4).  Pt asymptomatic and cell count from ascitic fluid unremarkable. Per ID recs, no need to treat diphtheroids or asymptomatic ESBL Kleb pneumo bacteriuria though pt has had 6 days of therapy which would cover these organisms. Mild peritransplant ascites- repeat US 10/6 with increased arterial resistive indices. Repeat US 10/9  w/ continued elevation in RIs and fluid collections. LFTs trending down nicely.     Acute gradual worsening of renal function s/p OLTx.Creatinine on arrival 0.8 and has been up trending. Nephrology consulted for post-op EVA. Cr cont to trend up but remains to have good UOP. Had stable response to lasiz diuresis.  BUN elevated requiring multiple rounds of dialysis (10/15, 10/17, 10/20, 10/31). No significant improvements in kidney function noted.   In addition, pt H/H cont to fluctuate requiring multiple blood transfusions (10/14, 10/16, 10/18, 10/19, 10/22, 10/27, 10/29).  Pt reported dark stools 10/15 but color has normalized. FOBT+. EGD 10/18 consistent with ulcerated mucosa in duodenum s/p coagulation. Concern for H. Pylori. Started Amoxicillin/levofloxacin (10/19, complete 11/2)). Remains on Protonix 40 mg BID. Biopsy negative for infection and CMV. Will cont close monitoring H/H, transfuse as needed with goal Hb > 7.0.  Of note, Urine cx + Klebsiella pneumoniae (10/13). Received 3 day course of ciprofloxacin, dc'd 10/19.   On 10/19 pm pt c/o crushing chest pain. Troponin 0.094, likely 2/2 ischemic stress. EKG NSR.     Interval history: Pt reports feeling much better today. Cr trending back up. Cont lasix 40 BID. H/H dropped again this morning. No transfusion needed at this time. Re-consulted GI with concern for possible ongoing GI bleed and further need of another EGD. Pt remains on protonix 40 BID. Denies melena. Cont to hold heparin. WBC cont to decrease. Immunosuppression managed with hydrocortisone 50 (cont to hold Cellcept, bactrim, valcyte). Restarted prograf today. CMV PCR pending. Received last HBIG weekly dose today. Cont to monitor.     Scheduled Meds:   docusate sodium  100 mg Oral Daily    entecavir  0.5 mg Oral Q72H    fluconazole  200 mg Oral Daily    furosemide  40 mg Intravenous BID    hepatitis B immune globulin (HEPA-RALPH B) IVPB  9,999.912 Units Intravenous Once    hydrocortisone sodium  succinate  50 mg Intravenous Q8H    insulin aspart U-100  10-12 Units Subcutaneous TIDWM    magnesium oxide  800 mg Oral BID    midodrine  15 mg Oral TID    mirtazapine  7.5 mg Oral QHS    pantoprazole  40 mg Intravenous BID    polyethylene glycol  17 g Oral Daily    tacrolimus  0.5 mg Oral BID    ursodiol  300 mg Oral BID     Continuous Infusions:   sodium chloride 0.9% 1,000 mL (10/31/18 2123)     PRN Meds:bisacodyl, bisacodyl, calcium carbonate, dextrose 50%, dextrose 50%, glucagon (human recombinant), glucose, glucose, insulin aspart U-100, ondansetron, ondansetron, oxyCODONE, oxyCODONE, polyethylene glycol, promethazine (PHENERGAN) IVPB, sodium chloride 0.9%    Review of Systems   Constitutional: Positive for activity change, appetite change (improving) and fatigue. Negative for fever.   Respiratory: Negative for cough and shortness of breath.    Cardiovascular: Positive for leg swelling. Negative for chest pain.   Gastrointestinal: Positive for abdominal distention and abdominal pain. Negative for blood in stool, constipation, diarrhea, nausea and vomiting.   Genitourinary: Negative for decreased urine volume, difficulty urinating and dysuria.   Musculoskeletal: Negative for arthralgias and back pain.   Skin: Positive for wound.   Allergic/Immunologic: Positive for immunocompromised state.   Neurological: Positive for weakness. Negative for tremors and headaches.   Psychiatric/Behavioral: Negative for confusion and sleep disturbance.     Objective:     Vital Signs (Most Recent):  Temp: 97.9 °F (36.6 °C) (11/02/18 1200)  Pulse: (!) 119 (11/02/18 1126)  Resp: 19 (11/02/18 1126)  BP: 113/70 (11/02/18 1126)  SpO2: 100 % (11/02/18 1126) Vital Signs (24h Range):  Temp:  [97.5 °F (36.4 °C)-98 °F (36.7 °C)] 97.9 °F (36.6 °C)  Pulse:  [] 119  Resp:  [10-21] 19  SpO2:  [96 %-100 %] 100 %  BP: (113-128)/(70-82) 113/70     Weight: 74.5 kg (164 lb 3.9 oz)  Body mass index is 29.09 kg/m².    Intake/Output -  Last 3 Shifts       10/31 0700 - 11/01 0659 11/01 0700 - 11/02 0659 11/02 0700 - 11/03 0659    P.O. 660 1320 450    I.V. (mL/kg) 1200 (16.2) 600 (8.1)     Total Intake(mL/kg) 1860 (25.1) 1920 (25.8) 450 (6)    Urine (mL/kg/hr) 800 (0.5) 350 (0.2) 450 (1.1)    Other 2083 848     Stool 0 0     Total Output 2883 1198 450    Net -1023 +722 0           Stool Occurrence 1 x 1 x           Physical Exam   Constitutional: She is oriented to person, place, and time.   Temporal and distal extremity muscle wasting   Eyes: EOM are normal. Pupils are equal, round, and reactive to light. Scleral icterus is present.   Neck: Normal range of motion.   Cardiovascular: Normal rate and regular rhythm.   No murmur heard.  Pulmonary/Chest: Effort normal and breath sounds normal. No respiratory distress. She has no wheezes.   Abdominal: Soft. Bowel sounds are normal. She exhibits distension. There is tenderness. There is no guarding.   Chevron incision w/ staples, CDI  Leakage from previous RLQ VALORIE site   Musculoskeletal: Normal range of motion. She exhibits edema (3+ BLE).   Neurological: She is alert and oriented to person, place, and time.   Skin: Skin is warm and dry.   Nursing note and vitals reviewed.      Laboratory:  Immunosuppressants         Stop Route Frequency     tacrolimus capsule 0.5 mg      -- Oral 2 times daily        CBC:   Recent Labs   Lab 11/02/18  0600 11/02/18  0950   WBC 1.84*  --    RBC 2.39*  --    HGB 7.3* 7.7*   HCT 22.8* 24.3*   PLT 74*  --    MCV 95  --    MCH 30.5  --    MCHC 32.0  --      CMP:   Recent Labs   Lab 11/02/18  0600   *   CALCIUM 8.5*   ALBUMIN 2.5*   PROT 4.2*   *   K 4.1   CO2 23      BUN 70*   CREATININE 1.6*   ALKPHOS 113   ALT 17   AST 19   BILITOT 2.2*     Labs within the past 24 hours have been reviewed.    Diagnostic Results:  I have personally reviewed all pertinent imaging studies.    Assessment/Plan:     * Liver transplanted    - pmhx of  ESLD secondary to hep B  cirrhosis, now s/p liver transplant on 10/5, with takeback for hyperbilirubinemia and bilious VALORIE output 10/6; no biliary leak identified.   - POD 1 US: increased arterial resistive indices, suggestive of edema vs cute rejection vs congestion.  - Repeat US 10/9 with continued elevation of RIs.  - LFTs stable.  - T bili trending down.  - repeat liver US (10/11): elevated RIs.  -Now w/ Post LTx EVA and possible ongoing GI bleed           Leukopenia    -Stopped Cellcept, Bactrim, and Valcyte 10/31--> will resume once WBC improves and stable  -CMV PCR pending   -Monitor        Azotemia    CRRT 10/31 for uremic toxin clearance  BUN trending back up  Monitor        Duodenal ulcer    - EGD 10/18- sig for ulcerated duodenal mucosa.   - Biopsy 10/18- no infection detected.  -Received 14 day course of amoxicillin/levo (completed 11/2) for potential H. pylori  - Cont protonix 40 BID.         UTI (urinary tract infection)    - Urine Cx 10/15 w/ Klebsiella pneumonia.  - Started Ciprofloxacin (10/17-10/19).   -Started on amoxicillin/levo for H. Pylori (completed abx 11/2)  - Pt asymptomatic at this time.       Acute blood loss anemia    - H&H cont to fluctuate.   - FOBT +  - EGD 10/18- showed diffuse bleeding with ulcerated duodenal mucosa   - Monitoring w/ CBC q 12 to assess the need for transfusion.  - 1 unit PRBCs transfused 10/27. H&H stable today.            Post LT - Acute renal failure    - Creatinine on arrival 0.8 and trended up.  - Nephrology consulted.  - Renal US (10/11): no hydronephrosis.  - Echo 10/12: diastolic dysfunction, likely 2/2 to ARF.  - CRRT 10/14; SLED 10/17, 10/20. --> no improvements noted in kidney fxn.  -24 hr urine creatine collection :  CrCl of 7.   -CRRT 10/31 with 1L removed.   -Cont Lasix 40 BID (restarted 11/1)     Hypervolemia associated with renal insufficiency    - CRRT 10/14, 10/17, 10/20, 10/31  -CXR 10/30: abundant fluid in b/l pleural space  -Restarted Lasix 40 BID (11/2)       EVA (acute  kidney injury)           Metabolic acidosis    - CRRT 10/14.  - SLED 10/17, 10/20, 10/31         At risk for opportunistic infections    - Valcyte for CMV prophylaxis--> holding (10/31) for leukopenia. Pt will need weekly CMV PCR while discontinued.  - Bactrim for PCP prophylaxis (MWF).--> holding (10/31) for leukopenia  - Fluconazole for fungal prophylaxis.         Long-term use of immunosuppressant medication    -  Prograf: stopped 10/29. Resumed 11/2.  --> Will monitor for signs of toxic side effects, check daily troughs, and change meds accordingly.  -Prednisone: stopped 10/29. Started on Hydrocortisone  - Cellcept: stopped 10/31 for leukopenia. Will resume after improvements in WBC         Prophylactic immunotherapy    - See long term use immunosuppresion.       Hypokalemia           Ascites    - paracentesis 10/1, 5L removed, no fluid sent for analysis.  -pt remains volume overloaded.        Weakness    - see physical decond.       Physical deconditioning    - PT/OT consulted.  - Recommend home health PT and rolling walker at discharge.       Hyponatremia    - Na 119 on admit.  - Na was improving post transplant w HD.  - Na stable at this time.              Hypotension    - continue Midodrine 15 mg TID. Monitor.       Anemia requiring transfusions    - Fluctuating H/H.    - Transfusions: 10/14; 10/16; 10/18; 10/19; 10/22; 10/27; 10/29, 10/31  - FOBT +  - LDH elevated, Hapto < 10.  - EGD 10/18: ulcerated duodenal mucosa.   - Monitoring w/ CBC q12hr to assess the need for transfusion.  - H/H decreased today. No transfusion needed at this time. Cont to monitor  -Consulted GI about this ongoing issue and further need of another EGD         Severe malnutrition    - dietary consulted.   - temporal and distal extremity muscle wasting and edema.  - encourage supplements and to increase nutritional intake .  - per nephrology, pt to be on low protein diet.   -Checking pre-albumin in the a.m.     Type 2 diabetes mellitus  with diabetic polyneuropathy, with long-term current use of insulin    - continue home regimen.  - endocrine consulted.  - Pt off insulin drip at this time.  Apprec endo recs.        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             VTE Risk Mitigation (From admission, onward)        Ordered     Place sequential compression device  Until discontinued      10/05/18 0709          The patients clinical status was discussed at multidisplinary rounds, involving transplant surgery, transplant medicine, pharmacy, nursing, nutrition, and social work    Discharge Planning: not a candidate at this time      Jihan Soares PA-C  Liver Transplant  [facility]

## 2018-11-02 NOTE — PLAN OF CARE
Problem: Physical Therapy Goal  Goal: Physical Therapy Goal  Goals to be met by: 2018    Patient will increase functional independence with mobility by performin. Supine to sit with Contact Guard Assistance - not met  2. Sit to stand transfer with Supervision using LRAD or no AD - not met  3. Bed to chair transfer with Supervision using LRAD or no AD - not met  4. Gait  x 250 feet with Supervision using LRAD or no AD - not met  5. Stand for 3 minutes with Supervision to increase activity tolerance - not met   6. Lower extremity exercise program x15 reps per handout, with independence - not met       Outcome: Ongoing (interventions implemented as appropriate)  Goals reviewed and remain appropriate. Pt progressing towards goals.    Radha Nguyen, PT, DPT   2018  [phone]

## 2018-11-02 NOTE — PT/OT/SLP PROGRESS
Physical Therapy Treatment    Patient Name:  Scarlett Reddy   MRN:  [id number]    Recommendations:     Discharge Recommendations:  home health PT, home health OT   Discharge Equipment Recommendations: walker, rolling   Barriers to discharge: Decreased caregiver support at current functional level    Assessment:     Scarlett Reddy is a 68 y.o. female admitted with a medical diagnosis of Liver transplanted.  She presents with the following impairments/functional limitations:  weakness, gait instability, impaired balance, impaired self care skills, impaired functional mobilty, impaired cardiopulmonary response to activity, edema, decreased safety awareness, decreased lower extremity function. Pt continues to require increased assist to transfer from sit>stand. Ambulated in hallway this date but continues to demo impaired endurance, weakness, and increased edema. Pt demo'ing difficulty clearing LE from floor and requiring multiple standing rest breaks to complete gait. Pt would continue to benefit from skilled acute PT in order to address current deficits and progress functional mobility.     Rehab Prognosis:  good; patient would benefit from acute skilled PT services to address these deficits and reach maximum level of function.      Recent Surgery: Procedure(s) (LRB):  EGD (ESOPHAGOGASTRODUODENOSCOPY) (N/A) 15 Days Post-Op    Plan:     During this hospitalization, patient to be seen 4 x/week to address the above listed problems via gait training, therapeutic activities, therapeutic exercises, neuromuscular re-education  · Plan of Care Expires:  11/05/18   Plan of Care Reviewed with: patient, spouse    Subjective     Communicated with RN prior to session.  Patient found UIC upon PT entry to room, agreeable to treatment.      Chief Complaint: abdominal drainage   Patient comments/goals: Pt facetiming with family upon PT entering room and family stating that pt is ready for therapy today.   Pain/Comfort:  · Pain Rating 1:  0/10    Patients cultural, spiritual, Religion conflicts given the current situation: none noted     Objective:     Patient found with: telemetry, pulse ox (continuous), central line     General Precautions: Standard, fall, contact   Orthopedic Precautions:N/A   Braces: N/A     Functional Mobility:  · Transfers:     · Sit to Stand:  moderate assistance with rolling walker  · Gait: 112 ft. with RW and CGA  ? Multiple, intermittent standing rest breaks throughout gait  ? Decreased giuseppe, decreased step length, impaired weight-shifting ability, decreased toe-floor clearance   ? Cues provided for safety awareness and RW managing, including maintaining close proximity to RW       AM-PAC 6 CLICK MOBILITY  Turning over in bed (including adjusting bedclothes, sheets and blankets)?: 3  Sitting down on and standing up from a chair with arms (e.g., wheelchair, bedside commode, etc.): 2  Moving from lying on back to sitting on the side of the bed?: 2  Moving to and from a bed to a chair (including a wheelchair)?: 3  Need to walk in hospital room?: 3  Climbing 3-5 steps with a railing?: 2  Basic Mobility Total Score: 15       Therapeutic Activities and Exercises:   RN notified of abdominal drainage and possible need for bandage to be changed.     Patient left up in chair with all lines intact, call button in reach, RN notified and pt's  present..    GOALS:   Multidisciplinary Problems     Physical Therapy Goals        Problem: Physical Therapy Goal    Goal Priority Disciplines Outcome Goal Variances Interventions   Physical Therapy Goal     PT, PT/OT Ongoing (interventions implemented as appropriate)     Description:  Goals to be met by: 2018    Patient will increase functional independence with mobility by performin. Supine to sit with Contact Guard Assistance - not met  2. Sit to stand transfer with Supervision using LRAD or no AD - not met  3. Bed to chair transfer with Supervision using LRAD or no AD -  not met  4. Gait  x 250 feet with Supervision using LRAD or no AD - not met  5. Stand for 3 minutes with Supervision to increase activity tolerance - not met   6. Lower extremity exercise program x15 reps per handout, with independence - not met                        Time Tracking:     PT Received On: 11/02/18  PT Start Time: 1402     PT Stop Time: 1418  PT Total Time (min): 16 min     Billable Minutes: Gait Training 16    Treatment Type: Treatment  PT/PTA: PT     PTA Visit Number: 0     Radha Nguyen, PT, DPT   11/2/2018  [phone]

## 2018-11-02 NOTE — ASSESSMENT & PLAN NOTE
BG goal 140-180.     continue Novolog to 11-13 units with meals; will consider one set dose after discussing tomorrow.   Low dose correction scale given kidney function.  BG monitoring AC/HS    Discharge planning:  TBGOVIND

## 2018-11-02 NOTE — ASSESSMENT & PLAN NOTE
- dietary consulted.   - temporal and distal extremity muscle wasting and edema.  - encourage supplements and to increase nutritional intake .  - per nephrology, pt to be on low protein diet.   -Checking pre-albumin in the a.m.

## 2018-11-02 NOTE — PROGRESS NOTES
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  Paracentesis 10/1 with 5L removed, no fluid sent for analysis. 10/02 significant for hyponatremia, Na 119 and she was admitted to LTS for sodium monitoring. On 10/05/18 she is s/p DBDLT (CMV D+/R+, steroid induction, MMF/tacro/pred maintenance) with take back on 10/6 secondary to hyperbilirubinema, no leak identified. Post-op course complicated by hypercapneic and hypoxic respiratory failure and was intubated 10/06 and extubated 10/07. Liver bx (10/6) sig for Zone 3 hemorrhage and collapse, in addition to cholestasis. ID consulted to comment on positive diphtheroid culture from ascitic fluid (likely skin colonization) as well as ESBL Klebsiella pneumoniae in urine culture (10/4).  Pt asymptomatic and cell count from ascitic fluid unremarkable. Mild peritransplant ascites- repeat US 10/6 with increased arterial resistive indices. Repeat US 10/9 w/ continued elevation in RIs and fluid collections. LFTs trending down nicely.     Patient is currently on entecavir 0.5 mg, Hepagam, mycophenalate 1000 mg BID, prednisone 20 mg and prophylaxis with Bactrim, fluconazole and Valgancyclovir.    Nephrology consulted for acute kidney failure is setting of Orthotopic Liver Transplant. Cr 0.8 on 10/05, started to trend up and 1.5 (10/09), 1.8 (10/10), and 1.9 (10/11). UOP 10/10/18: 520 ml. UA (10/09): 1+ occult blood and 1+ protein with RBC and amorphous casts.        Interval History: Patient with better mood this morning.She states that she feels more energetic. She wasn't able to measure her urine output but will try today. He creatinine and BUN  elevated today to 1.6 and 70.Remains to urinate well per patient and no SOB   Review of patient's allergies indicates:  No Known Allergies  Current Facility-Administered Medications   Medication Frequency    0.9%  NaCl infusion (CRRT USE ONLY) Continuous    bisacodyl EC tablet 10 mg Daily PRN    bisacodyl suppository 10 mg Daily PRN    calcium carbonate 200 mg calcium (500 mg) chewable tablet 500 mg TID PRN    dextrose 50% injection 12.5 g PRN    dextrose 50% injection 25 g PRN    docusate sodium capsule 100 mg Daily    entecavir tablet 0.5 mg Q72H    fluconazole tablet 200 mg Daily    furosemide injection 40 mg BID    glucagon (human recombinant) injection 1 mg PRN    glucose chewable tablet 16 g PRN    glucose chewable tablet 24 g PRN    hepatitis B immun glob-maltose (Hepagam B) greatr than 312 unit/mL (5 mL) 9,999.912 Units in sodium chloride 0.9% 250 mL Once    hydrocortisone sodium succinate injection 50 mg Q8H    insulin aspart U-100 pen 0-5 Units QID (AC + HS) PRN    insulin aspart U-100 pen 10-12 Units TIDWM    magnesium oxide tablet 800 mg BID    midodrine tablet 15 mg TID    mirtazapine tablet 7.5 mg QHS    ondansetron disintegrating tablet 8 mg Q8H PRN    ondansetron injection 4 mg Q8H PRN    oxyCODONE immediate release tablet 5 mg Q4H PRN    oxyCODONE immediate release tablet Tab 10 mg Q4H PRN    pantoprazole injection 40 mg BID    polyethylene glycol packet 17 g Daily PRN    polyethylene glycol packet 17 g Daily    promethazine (PHENERGAN) 6.25 mg in dextrose 5 % 50 mL IVPB Q6H PRN    sodium chloride 0.9% flush 3 mL PRN    tacrolimus capsule 0.5 mg BID    ursodiol capsule 300 mg BID       Objective:     Vital Signs (Most Recent):  Temp: 97.9 °F (36.6 °C) (11/02/18 1200)  Pulse: (!) 119 (11/02/18 1126)  Resp: 19 (11/02/18 1126)  BP: 113/70 (11/02/18 1126)  SpO2: 97 % (11/02/18 1131)  O2 Device (Oxygen Therapy): room air (11/02/18 1131) Vital Signs (24h Range):  Temp:   [97.5 °F (36.4 °C)-98 °F (36.7 °C)] 97.9 °F (36.6 °C)  Pulse:  [] 119  Resp:  [10-21] 19  SpO2:  [96 %-100 %] 97 %  BP: (113-128)/(70-82) 113/70     Weight: 74.5 kg (164 lb 3.9 oz) (11/02/18 0530)  Body mass index is 29.09 kg/m².  Body surface area is 1.82 meters squared.    I/O last 3 completed shifts:  In: 3480 [P.O.:1680; I.V.:1800]  Out: 3831 [Urine:900; Other:2931]    Physical Exam  General:Not distressed,  well developed female, oriented to person, place, and time.     HEENT: Normocephalic and atraumatic.  External nose appears normal. External ear appears normal. Conjunctivae appear normal. Neck has normal range of motion. Neck supple.     Cardiovascular: RRR, no murmurs rubs or gallops. DP pulses 2+    Pulmonary: Effort normal and breath sounds normal. No respiratory distress. No wheezes, no rales, no rhonchi.    Abdominal: Normoactive bowel sounds. Exhibits distension. There is tenderness. Dressing clean dry and intact There is no rebound and no guarding.     Extremities: No clubbing, cyanosis. 2+ edema    Skin: Warm and dry, No bruising    Neurological: No obvious focal motor or sensory defects, normal muscle tone.      Psychiatric: Normal mood and affect. behavior is normal. Thought content normal.    Significant Labs:  CBC:   Recent Labs   Lab 11/02/18  0600 11/02/18  0950   WBC 1.84*  --    RBC 2.39*  --    HGB 7.3* 7.7*   HCT 22.8* 24.3*   PLT 74*  --    MCV 95  --    MCH 30.5  --    MCHC 32.0  --      CMP:   Recent Labs   Lab 11/02/18  0600   *   CALCIUM 8.5*   ALBUMIN 2.5*   PROT 4.2*   *   K 4.1   CO2 23      BUN 70*   CREATININE 1.6*   ALKPHOS 113   ALT 17   AST 19   BILITOT 2.2*     All labs within the past 24 hours have been reviewed.     Significant Imaging:  Labs: Reviewed    Assessment/Plan:      Post LT - Acute renal failure    Post-LT EVA is multifactorial in origin and has been related to the severity of liver disease, toxicity of immunosuppressive therapy and  hepatic ischaemia reperfusion injury might be a driving force in the aetiology of post-LT EVA.      - acute gradual worsening of renal function s/p OLT  - 10/06 was taken to the OR for possibility of billiary leak and post op complication due to hypercapnic hypoxic respiratory failure, no blood pressure drop intraoperatively per anesthesiology notes. Patient has not been needing vasopressors to maintain BP  - Creatinine on arrival 0.8 and has been up trending   - BUN/cr (10/11/18): 91/1.9; (10/12/18): 100/2.0; (10/13/18): 110/2.4  - Tacrolimus 26.9 (10/08); 6.8 (10/12); 5.6 (10/13), remains in none nephrotoxic levels   - Protein Creatine ratio: 0.82  - UA consistent with hematuria, proteinuria, RBC and amorphous casts   - Fena 0.5%; consistent with hypovolemia   - Renal US negative for obstruction, stricture or, hydronephrosis    Assessment: Acute Renal failure in setting of hepatic ischemia reperfusion injury and poor perfusion status vs acute tubular necrosis from immunosuppressive agents      Plan:  -SLED for uremic toxin clearance was perfromed last 10/31 , will give her time to rest today no SLED  - Patient azotemia likely secondary to her H/H dropping from GI bleed  -   Lasix 40 mg IV BID  Please document urine output  -Modified renal diet : decrease in protein , normal intake of K and phos   - avoid nephrotoxic drugs, ACEI/ARB, NSAIDS  - adjust drugs to GFR  - Agree with fluid restriction of 1500 cc/day  - Strict I/O's   This plan was discussed with patient and family at bedside using the [facility]                                   Thank you for your consult. I will follow-up with patient. Please contact us if you have any additional questions.    Vaishali Ji MD  Nephrology  [facility]

## 2018-11-02 NOTE — SUBJECTIVE & OBJECTIVE
Interval History: Patient with better mood this morning.She states that she feels more energetic. She wasn't able to measure her urine output but will try today. He creatinine and BUN elevated today to 1.6 and 70.Remains to urinate well per patient and no SOB   Review of patient's allergies indicates:  No Known Allergies  Current Facility-Administered Medications   Medication Frequency    0.9%  NaCl infusion (CRRT USE ONLY) Continuous    bisacodyl EC tablet 10 mg Daily PRN    bisacodyl suppository 10 mg Daily PRN    calcium carbonate 200 mg calcium (500 mg) chewable tablet 500 mg TID PRN    dextrose 50% injection 12.5 g PRN    dextrose 50% injection 25 g PRN    docusate sodium capsule 100 mg Daily    entecavir tablet 0.5 mg Q72H    fluconazole tablet 200 mg Daily    furosemide injection 40 mg BID    glucagon (human recombinant) injection 1 mg PRN    glucose chewable tablet 16 g PRN    glucose chewable tablet 24 g PRN    hepatitis B immun glob-maltose (Hepagam B) greatr than 312 unit/mL (5 mL) 9,999.912 Units in sodium chloride 0.9% 250 mL Once    hydrocortisone sodium succinate injection 50 mg Q8H    insulin aspart U-100 pen 0-5 Units QID (AC + HS) PRN    insulin aspart U-100 pen 10-12 Units TIDWM    magnesium oxide tablet 800 mg BID    midodrine tablet 15 mg TID    mirtazapine tablet 7.5 mg QHS    ondansetron disintegrating tablet 8 mg Q8H PRN    ondansetron injection 4 mg Q8H PRN    oxyCODONE immediate release tablet 5 mg Q4H PRN    oxyCODONE immediate release tablet Tab 10 mg Q4H PRN    pantoprazole injection 40 mg BID    polyethylene glycol packet 17 g Daily PRN    polyethylene glycol packet 17 g Daily    promethazine (PHENERGAN) 6.25 mg in dextrose 5 % 50 mL IVPB Q6H PRN    sodium chloride 0.9% flush 3 mL PRN    tacrolimus capsule 0.5 mg BID    ursodiol capsule 300 mg BID       Objective:     Vital Signs (Most Recent):  Temp: 97.9 °F (36.6 °C) (11/02/18 1200)  Pulse: (!) 119  (11/02/18 1126)  Resp: 19 (11/02/18 1126)  BP: 113/70 (11/02/18 1126)  SpO2: 97 % (11/02/18 1131)  O2 Device (Oxygen Therapy): room air (11/02/18 1131) Vital Signs (24h Range):  Temp:  [97.5 °F (36.4 °C)-98 °F (36.7 °C)] 97.9 °F (36.6 °C)  Pulse:  [] 119  Resp:  [10-21] 19  SpO2:  [96 %-100 %] 97 %  BP: (113-128)/(70-82) 113/70     Weight: 74.5 kg (164 lb 3.9 oz) (11/02/18 0530)  Body mass index is 29.09 kg/m².  Body surface area is 1.82 meters squared.    I/O last 3 completed shifts:  In: 3480 [P.O.:1680; I.V.:1800]  Out: 3831 [Urine:900; Other:2931]    Physical Exam  General:Not distressed,  well developed female, oriented to person, place, and time.     HEENT: Normocephalic and atraumatic.  External nose appears normal. External ear appears normal. Conjunctivae appear normal. Neck has normal range of motion. Neck supple.     Cardiovascular: RRR, no murmurs rubs or gallops. DP pulses 2+    Pulmonary: Effort normal and breath sounds normal. No respiratory distress. No wheezes, no rales, no rhonchi.    Abdominal: Normoactive bowel sounds. Exhibits distension. There is tenderness. Dressing clean dry and intact There is no rebound and no guarding.     Extremities: No clubbing, cyanosis. 2+ edema    Skin: Warm and dry, No bruising    Neurological: No obvious focal motor or sensory defects, normal muscle tone.      Psychiatric: Normal mood and affect. behavior is normal. Thought content normal.    Significant Labs:  CBC:   Recent Labs   Lab 11/02/18  0600 11/02/18  0950   WBC 1.84*  --    RBC 2.39*  --    HGB 7.3* 7.7*   HCT 22.8* 24.3*   PLT 74*  --    MCV 95  --    MCH 30.5  --    MCHC 32.0  --      CMP:   Recent Labs   Lab 11/02/18  0600   *   CALCIUM 8.5*   ALBUMIN 2.5*   PROT 4.2*   *   K 4.1   CO2 23      BUN 70*   CREATININE 1.6*   ALKPHOS 113   ALT 17   AST 19   BILITOT 2.2*     All labs within the past 24 hours have been reviewed.     Significant Imaging:  Labs: Reviewed

## 2018-11-02 NOTE — PROGRESS NOTES
TIMBO contacted Dunia, patient's , to receive update on caregiver plan. Per Dunia, once patient is released from the hospital someone in the community will sit with patient and relieve  during the day once pt discharges from hospital. Dunia reported that this individual speaks [place] and [place]ian. She was not aware of the individuals name but will update the SW team once she has gathered more information.

## 2018-11-02 NOTE — ASSESSMENT & PLAN NOTE
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   - POD 1 US: increased arterial resistive indices, suggestive of edema vs cute rejection vs congestion.  - Repeat US 10/9 with continued elevation of RIs.  - LFTs stable.  - T bili trending down.  - repeat liver US (10/11): elevated RIs.  -Now w/ Post LTx EVA and possible ongoing GI bleed

## 2018-11-02 NOTE — ASSESSMENT & PLAN NOTE
- EGD 10/18- sig for ulcerated duodenal mucosa.   - Biopsy 10/18- no infection detected.  -Received 14 day course of amoxicillin/levo (completed 11/2) for potential H. pylori  - Cont protonix 40 BID.

## 2018-11-02 NOTE — ASSESSMENT & PLAN NOTE
- Creatinine on arrival 0.8 and trended up.  - Nephrology consulted.  - Renal US (10/11): no hydronephrosis.  - Echo 10/12: diastolic dysfunction, likely 2/2 to ARF.  - CRRT 10/14; SLED 10/17, 10/20. --> no improvements noted in kidney fxn.  -24 hr urine creatine collection :  CrCl of 7.   -CRRT 10/31 with 1L removed.   -Cont Lasix 40 BID (restarted 11/1)

## 2018-11-02 NOTE — ASSESSMENT & PLAN NOTE
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      - acute gradual worsening of renal function s/p OLT  - 10/06 was taken to the OR for possibility of billiary leak and post op complication due to hypercapnic hypoxic respiratory failure, no blood pressure drop intraoperatively per anesthesiology notes. Patient has not been needing vasopressors to maintain BP  - Creatinine on arrival 0.8 and has been up trending   - BUN/cr (10/11/18): 91/1.9; (10/12/18): 100/2.0; (10/13/18): 110/2.4  - Tacrolimus 26.9 (10/08); 6.8 (10/12); 5.6 (10/13), remains in none nephrotoxic levels   - Protein Creatine ratio: 0.82  - UA consistent with hematuria, proteinuria, RBC and amorphous casts   - Fena 0.5%; consistent with hypovolemia   - Renal US negative for obstruction, stricture or, hydronephrosis    Assessment: Acute Renal failure in setting of hepatic ischemia reperfusion injury and poor perfusion status vs acute tubular necrosis from immunosuppressive agents      Plan:  -SLED for uremic toxin clearance was perfromed last 10/31 , will give her time to rest today no SLED  -   Lasix 40 mg IV BID  Please document urine output  -Modified renal diet : decrease in protein , normal intake of K and phos   - avoid nephrotoxic drugs, ACEI/ARB, NSAIDS  - adjust drugs to GFR  - Agree with fluid restriction of 1500 cc/day  - Strict I/O's   This plan was discussed with patient and family at bedside using the [facility]

## 2018-11-02 NOTE — ASSESSMENT & PLAN NOTE
- CRRT 10/14, 10/17, 10/20, 10/31  -CXR 10/30: abundant fluid in b/l pleural space  -Restarted Lasix 40 BID (11/2)

## 2018-11-02 NOTE — ASSESSMENT & PLAN NOTE
- Fluctuating H/H.    - Transfusions: 10/14; 10/16; 10/18; 10/19; 10/22; 10/27; 10/29, 10/31  - FOBT +  - LDH elevated, Hapto < 10.  - EGD 10/18: ulcerated duodenal mucosa.   - Monitoring w/ CBC q12hr to assess the need for transfusion.  - H/H decreased today. No transfusion needed at this time. Cont to monitor  -Consulted GI about this ongoing issue and further need of another EGD

## 2018-11-02 NOTE — CARE UPDATE
Increase Novolog to 10-12 units with meals  Low dose correction scale given kidney function.  BG monitoring AC/HS

## 2018-11-02 NOTE — ASSESSMENT & PLAN NOTE
-  Prograf: stopped 10/29. Resumed 11/2.  --> Will monitor for signs of toxic side effects, check daily troughs, and change meds accordingly.  -Prednisone: stopped 10/29. Started on Hydrocortisone  - Cellcept: stopped 10/31 for leukopenia. Will resume after improvements in WBC

## 2018-11-02 NOTE — ASSESSMENT & PLAN NOTE
- Urine Cx 10/15 w/ Klebsiella pneumonia.  - Started Ciprofloxacin (10/17-10/19).   -Started on amoxicillin/levo for H. Pylori (completed abx 11/2)  - Pt asymptomatic at this time.

## 2018-11-02 NOTE — SUBJECTIVE & OBJECTIVE
Scheduled Meds:   docusate sodium  100 mg Oral Daily    entecavir  0.5 mg Oral Q72H    fluconazole  200 mg Oral Daily    furosemide  40 mg Intravenous BID    hepatitis B immune globulin (HEPA-RALPH B) IVPB  9,999.912 Units Intravenous Once    hydrocortisone sodium succinate  50 mg Intravenous Q8H    insulin aspart U-100  10-12 Units Subcutaneous TIDWM    magnesium oxide  800 mg Oral BID    midodrine  15 mg Oral TID    mirtazapine  7.5 mg Oral QHS    pantoprazole  40 mg Intravenous BID    polyethylene glycol  17 g Oral Daily    tacrolimus  0.5 mg Oral BID    ursodiol  300 mg Oral BID     Continuous Infusions:   sodium chloride 0.9% 1,000 mL (10/31/18 2123)     PRN Meds:bisacodyl, bisacodyl, calcium carbonate, dextrose 50%, dextrose 50%, glucagon (human recombinant), glucose, glucose, insulin aspart U-100, ondansetron, ondansetron, oxyCODONE, oxyCODONE, polyethylene glycol, promethazine (PHENERGAN) IVPB, sodium chloride 0.9%    Review of Systems   Constitutional: Positive for activity change, appetite change (improving) and fatigue. Negative for fever.   Respiratory: Negative for cough and shortness of breath.    Cardiovascular: Positive for leg swelling. Negative for chest pain.   Gastrointestinal: Positive for abdominal distention and abdominal pain. Negative for blood in stool, constipation, diarrhea, nausea and vomiting.   Genitourinary: Negative for decreased urine volume, difficulty urinating and dysuria.   Musculoskeletal: Negative for arthralgias and back pain.   Skin: Positive for wound.   Allergic/Immunologic: Positive for immunocompromised state.   Neurological: Positive for weakness. Negative for tremors and headaches.   Psychiatric/Behavioral: Negative for confusion and sleep disturbance.     Objective:     Vital Signs (Most Recent):  Temp: 97.9 °F (36.6 °C) (11/02/18 1200)  Pulse: (!) 119 (11/02/18 1126)  Resp: 19 (11/02/18 1126)  BP: 113/70 (11/02/18 1126)  SpO2: 100 % (11/02/18 1126) Vital  Signs (24h Range):  Temp:  [97.5 °F (36.4 °C)-98 °F (36.7 °C)] 97.9 °F (36.6 °C)  Pulse:  [] 119  Resp:  [10-21] 19  SpO2:  [96 %-100 %] 100 %  BP: (113-128)/(70-82) 113/70     Weight: 74.5 kg (164 lb 3.9 oz)  Body mass index is 29.09 kg/m².    Intake/Output - Last 3 Shifts       10/31 0700 - 11/01 0659 11/01 0700 - 11/02 0659 11/02 0700 - 11/03 0659    P.O. 660 1320 450    I.V. (mL/kg) 1200 (16.2) 600 (8.1)     Total Intake(mL/kg) 1860 (25.1) 1920 (25.8) 450 (6)    Urine (mL/kg/hr) 800 (0.5) 350 (0.2) 450 (1.1)    Other 2083 848     Stool 0 0     Total Output 2883 1198 450    Net -1023 +722 0           Stool Occurrence 1 x 1 x           Physical Exam   Constitutional: She is oriented to person, place, and time.   Temporal and distal extremity muscle wasting   Eyes: EOM are normal. Pupils are equal, round, and reactive to light. Scleral icterus is present.   Neck: Normal range of motion.   Cardiovascular: Normal rate and regular rhythm.   No murmur heard.  Pulmonary/Chest: Effort normal and breath sounds normal. No respiratory distress. She has no wheezes.   Abdominal: Soft. Bowel sounds are normal. She exhibits distension. There is tenderness. There is no guarding.   Chevron incision w/ staples, CDI  Leakage from previous RLQ VALORIE site   Musculoskeletal: Normal range of motion. She exhibits edema (3+ BLE).   Neurological: She is alert and oriented to person, place, and time.   Skin: Skin is warm and dry.   Nursing note and vitals reviewed.      Laboratory:  Immunosuppressants         Stop Route Frequency     tacrolimus capsule 0.5 mg      -- Oral 2 times daily        CBC:   Recent Labs   Lab 11/02/18  0600 11/02/18  0950   WBC 1.84*  --    RBC 2.39*  --    HGB 7.3* 7.7*   HCT 22.8* 24.3*   PLT 74*  --    MCV 95  --    MCH 30.5  --    MCHC 32.0  --      CMP:   Recent Labs   Lab 11/02/18  0600   *   CALCIUM 8.5*   ALBUMIN 2.5*   PROT 4.2*   *   K 4.1   CO2 23      BUN 70*   CREATININE 1.6*    ALKPHOS 113   ALT 17   AST 19   BILITOT 2.2*     Labs within the past 24 hours have been reviewed.    Diagnostic Results:  I have personally reviewed all pertinent imaging studies.

## 2018-11-02 NOTE — ASSESSMENT & PLAN NOTE
- HBsAg+, Received HBIG (last weekly dose 11/2)  -Tx w/ Entecavir- dose changed to q72 hours given renal function.

## 2018-11-03 LAB
ALBUMIN SERPL BCP-MCNC: 2.5 G/DL
ALP SERPL-CCNC: 114 U/L
ALT SERPL W/O P-5'-P-CCNC: 14 U/L
ANION GAP SERPL CALC-SCNC: 10 MMOL/L
ANISOCYTOSIS BLD QL SMEAR: SLIGHT
AST SERPL-CCNC: 17 U/L
BASO STIPL BLD QL SMEAR: ABNORMAL
BASOPHILS # BLD AUTO: 0 K/UL
BASOPHILS NFR BLD: 0 %
BILIRUB SERPL-MCNC: 2.1 MG/DL
BLD PROD TYP BPU: NORMAL
BLOOD UNIT EXPIRATION DATE: NORMAL
BLOOD UNIT TYPE CODE: 6200
BLOOD UNIT TYPE: NORMAL
BUN SERPL-MCNC: 73 MG/DL
CALCIUM SERPL-MCNC: 8.4 MG/DL
CHLORIDE SERPL-SCNC: 99 MMOL/L
CO2 SERPL-SCNC: 23 MMOL/L
CODING SYSTEM: NORMAL
CREAT SERPL-MCNC: 1.7 MG/DL
DIFFERENTIAL METHOD: ABNORMAL
DISPENSE STATUS: NORMAL
EOSINOPHIL # BLD AUTO: 0 K/UL
EOSINOPHIL NFR BLD: 0.5 %
ERYTHROCYTE [DISTWIDTH] IN BLOOD BY AUTOMATED COUNT: 18.3 %
EST. GFR  (AFRICAN AMERICAN): 35 ML/MIN/1.73 M^2
EST. GFR  (NON AFRICAN AMERICAN): 30.3 ML/MIN/1.73 M^2
GLUCOSE SERPL-MCNC: 184 MG/DL
HCT VFR BLD AUTO: 20.9 %
HGB BLD-MCNC: 6.9 G/DL
HYPOCHROMIA BLD QL SMEAR: ABNORMAL
IMM GRANULOCYTES # BLD AUTO: 0.05 K/UL
IMM GRANULOCYTES NFR BLD AUTO: 2.6 %
INR PPP: 1
LYMPHOCYTES # BLD AUTO: 0.1 K/UL
LYMPHOCYTES NFR BLD: 7.3 %
MAGNESIUM SERPL-MCNC: 1.8 MG/DL
MCH RBC QN AUTO: 29.9 PG
MCHC RBC AUTO-ENTMCNC: 33 G/DL
MCV RBC AUTO: 91 FL
MONOCYTES # BLD AUTO: 0.3 K/UL
MONOCYTES NFR BLD: 13 %
NEUTROPHILS # BLD AUTO: 1.5 K/UL
NEUTROPHILS NFR BLD: 76.6 %
NRBC BLD-RTO: 0 /100 WBC
OVALOCYTES BLD QL SMEAR: ABNORMAL
PHOSPHATE SERPL-MCNC: 3.5 MG/DL
PLATELET # BLD AUTO: 67 K/UL
PLATELET BLD QL SMEAR: ABNORMAL
PMV BLD AUTO: 10.5 FL
POCT GLUCOSE: 180 MG/DL (ref 70–110)
POCT GLUCOSE: 193 MG/DL (ref 70–110)
POCT GLUCOSE: 210 MG/DL (ref 70–110)
POCT GLUCOSE: 240 MG/DL (ref 70–110)
POIKILOCYTOSIS BLD QL SMEAR: SLIGHT
POLYCHROMASIA BLD QL SMEAR: ABNORMAL
POTASSIUM SERPL-SCNC: 3.7 MMOL/L
PREALB SERPL-MCNC: 15 MG/DL
PROT SERPL-MCNC: 4.1 G/DL
PROTHROMBIN TIME: 10.8 SEC
RBC # BLD AUTO: 2.31 M/UL
SODIUM SERPL-SCNC: 132 MMOL/L
TACROLIMUS BLD-MCNC: 1.6 NG/ML
TRANS ERYTHROCYTES VOL PATIENT: NORMAL ML
WBC # BLD AUTO: 1.93 K/UL

## 2018-11-03 PROCEDURE — 63600175 PHARM REV CODE 636 W HCPCS: Performed by: PHYSICIAN ASSISTANT

## 2018-11-03 PROCEDURE — 80197 ASSAY OF TACROLIMUS: CPT

## 2018-11-03 PROCEDURE — 94761 N-INVAS EAR/PLS OXIMETRY MLT: CPT

## 2018-11-03 PROCEDURE — C9113 INJ PANTOPRAZOLE SODIUM, VIA: HCPCS | Performed by: PHYSICIAN ASSISTANT

## 2018-11-03 PROCEDURE — 20600001 HC STEP DOWN PRIVATE ROOM

## 2018-11-03 PROCEDURE — 25000003 PHARM REV CODE 250: Performed by: NURSE PRACTITIONER

## 2018-11-03 PROCEDURE — 85610 PROTHROMBIN TIME: CPT

## 2018-11-03 PROCEDURE — P9021 RED BLOOD CELLS UNIT: HCPCS

## 2018-11-03 PROCEDURE — 84100 ASSAY OF PHOSPHORUS: CPT

## 2018-11-03 PROCEDURE — 94664 DEMO&/EVAL PT USE INHALER: CPT

## 2018-11-03 PROCEDURE — 63600175 PHARM REV CODE 636 W HCPCS: Performed by: NURSE PRACTITIONER

## 2018-11-03 PROCEDURE — 85025 COMPLETE CBC W/AUTO DIFF WBC: CPT

## 2018-11-03 PROCEDURE — 25000003 PHARM REV CODE 250: Performed by: PHYSICIAN ASSISTANT

## 2018-11-03 PROCEDURE — 99900035 HC TECH TIME PER 15 MIN (STAT)

## 2018-11-03 PROCEDURE — 99232 SBSQ HOSP IP/OBS MODERATE 35: CPT | Mod: ,,, | Performed by: INTERNAL MEDICINE

## 2018-11-03 PROCEDURE — 27000221 HC OXYGEN, UP TO 24 HOURS

## 2018-11-03 PROCEDURE — 83735 ASSAY OF MAGNESIUM: CPT

## 2018-11-03 PROCEDURE — 25000003 PHARM REV CODE 250: Performed by: TRANSPLANT SURGERY

## 2018-11-03 PROCEDURE — 99233 SBSQ HOSP IP/OBS HIGH 50: CPT | Mod: 24,,, | Performed by: NURSE PRACTITIONER

## 2018-11-03 PROCEDURE — 84134 ASSAY OF PREALBUMIN: CPT

## 2018-11-03 PROCEDURE — 80053 COMPREHEN METABOLIC PANEL: CPT

## 2018-11-03 RX ORDER — PREDNISONE 10 MG/1
20 TABLET ORAL DAILY
Status: DISCONTINUED | OUTPATIENT
Start: 2018-11-03 | End: 2018-11-05

## 2018-11-03 RX ORDER — TACROLIMUS 1 MG/1
1 CAPSULE ORAL 2 TIMES DAILY
Status: DISCONTINUED | OUTPATIENT
Start: 2018-11-03 | End: 2018-11-11

## 2018-11-03 RX ORDER — HYDROCODONE BITARTRATE AND ACETAMINOPHEN 500; 5 MG/1; MG/1
TABLET ORAL
Status: DISCONTINUED | OUTPATIENT
Start: 2018-11-03 | End: 2018-11-05

## 2018-11-03 RX ADMIN — URSODIOL 300 MG: 300 CAPSULE ORAL at 09:11

## 2018-11-03 RX ADMIN — DOCUSATE SODIUM 100 MG: 100 CAPSULE, LIQUID FILLED ORAL at 08:11

## 2018-11-03 RX ADMIN — MAGNESIUM OXIDE TAB 400 MG (241.3 MG ELEMENTAL MG) 800 MG: 400 (241.3 MG) TAB at 09:11

## 2018-11-03 RX ADMIN — PANTOPRAZOLE SODIUM 40 MG: 40 INJECTION, POWDER, FOR SOLUTION INTRAVENOUS at 09:11

## 2018-11-03 RX ADMIN — FUROSEMIDE 40 MG: 10 INJECTION, SOLUTION INTRAMUSCULAR; INTRAVENOUS at 08:11

## 2018-11-03 RX ADMIN — MIDODRINE HYDROCHLORIDE 15 MG: 5 TABLET ORAL at 09:11

## 2018-11-03 RX ADMIN — FLUCONAZOLE 200 MG: 200 TABLET ORAL at 08:11

## 2018-11-03 RX ADMIN — PREDNISONE 20 MG: 10 TABLET ORAL at 12:11

## 2018-11-03 RX ADMIN — TACROLIMUS 1 MG: 1 CAPSULE ORAL at 06:11

## 2018-11-03 RX ADMIN — TACROLIMUS 0.5 MG: 0.5 CAPSULE ORAL at 08:11

## 2018-11-03 RX ADMIN — PANTOPRAZOLE SODIUM 40 MG: 40 INJECTION, POWDER, FOR SOLUTION INTRAVENOUS at 08:11

## 2018-11-03 RX ADMIN — MIDODRINE HYDROCHLORIDE 15 MG: 5 TABLET ORAL at 03:11

## 2018-11-03 RX ADMIN — INSULIN ASPART 10 UNITS: 100 INJECTION, SOLUTION INTRAVENOUS; SUBCUTANEOUS at 12:11

## 2018-11-03 RX ADMIN — INSULIN ASPART 10 UNITS: 100 INJECTION, SOLUTION INTRAVENOUS; SUBCUTANEOUS at 06:11

## 2018-11-03 RX ADMIN — MAGNESIUM OXIDE TAB 400 MG (241.3 MG ELEMENTAL MG) 800 MG: 400 (241.3 MG) TAB at 08:11

## 2018-11-03 RX ADMIN — MIRTAZAPINE 7.5 MG: 7.5 TABLET ORAL at 09:11

## 2018-11-03 RX ADMIN — URSODIOL 300 MG: 300 CAPSULE ORAL at 08:11

## 2018-11-03 RX ADMIN — MIDODRINE HYDROCHLORIDE 15 MG: 5 TABLET ORAL at 08:11

## 2018-11-03 RX ADMIN — INSULIN ASPART 1 UNITS: 100 INJECTION, SOLUTION INTRAVENOUS; SUBCUTANEOUS at 10:11

## 2018-11-03 RX ADMIN — HYDROCORTISONE SODIUM SUCCINATE 50 MG: 100 INJECTION, POWDER, FOR SOLUTION INTRAMUSCULAR; INTRAVENOUS at 05:11

## 2018-11-03 RX ADMIN — INSULIN ASPART 10 UNITS: 100 INJECTION, SOLUTION INTRAVENOUS; SUBCUTANEOUS at 08:11

## 2018-11-03 RX ADMIN — POLYETHYLENE GLYCOL 3350 17 G: 17 POWDER, FOR SOLUTION ORAL at 08:11

## 2018-11-03 NOTE — ASSESSMENT & PLAN NOTE
-Stopped Cellcept, Bactrim, and Valcyte 10/31--> will resume once WBC improves and stable  -CMV PCR noted to be positive at 129. Will discuss possible treatment.   -Monitor

## 2018-11-03 NOTE — PROGRESS NOTES
[facility]  Liver Transplant  Progress Note    Patient Name: Scarlett Reddy  MRN: [id number]  Admission Date: 10/1/2018  Hospital Length of Stay: 32 days  Code Status: Full Code  Primary Care Provider: Primary Doctor No  Post-Operative Day: 29    ORGAN:   LIVER  Disease Etiology: Cirrhosis: Type B and D  Donor Type:    - Brain Death  CDC High Risk:   No  Donor CMV Status:   Donor CMV Status: Positive  Donor HBcAB:   Negative  Donor HCV Status:   Negative  Donor HBV SETH: Negative  Donor HCV SETH: Negative  Whole or Partial: Whole Liver  Biliary Anastomosis: End to End  Arterial Anatomy: Replaced Left Hepatic and Right Hepatic  Subjective:     History of Present Illness:  Ms. Reddy is a 69 y/o female with PMH of ESLD secondary Hep B and D.  Listed for liver transplant with MELD 23.  Paracentesis 10/1 with 5L removed, no fluid sent for analysis.  Morning labs significant for hyponatremia, Na 119.  Pt admitted to LTS for sodium monitoring.        Hospital Course:  Hospital Course: 69 y/o F h/o HBV/delta cirrhosis s/p DBDLT 10/5/18 (CMV D+/R+, steroid induction, MMF/tacro/pred maintenance) with take back on 10/6 2/2 hyperbilirubinema and bilious VALORIE drainage, no leak identified. Post-op course c/b hypercapneic and hypoxic respiratory failure and was reintubated though quickly extubated now doing well. Liver bx (10/6) sig for Zone 3 hemorrhage and collapse, in addition to cholestasis. Transferred from ICU on POD #4. ID consulted to comment on positive diphtheroid culture from ascitic fluid (likely skin colonization) as well as ESBL Klebsiella pneumoniae in urine culture (10/4).  Pt asymptomatic and cell count from ascitic fluid unremarkable. Per ID recs, no need to treat diphtheroids or asymptomatic ESBL Kleb pneumo bacteriuria though pt has had 6 days of therapy which would cover these organisms. Mild peritransplant ascites- repeat US 10/6 with increased arterial resistive indices. Repeat US 10/9  w/ continued elevation in RIs and fluid collections. LFTs trending down nicely.     Acute gradual worsening of renal function s/p OLTx.Creatinine on arrival 0.8 and has been up trending. Nephrology consulted for post-op EVA. Cr cont to trend up but remains to have good UOP. Had stable response to lasiz diuresis.  BUN elevated requiring multiple rounds of dialysis (10/15, 10/17, 10/20, 10/31). No significant improvements in kidney function noted.   In addition, pt H/H cont to fluctuate requiring multiple blood transfusions (10/14, 10/16, 10/18, 10/19, 10/22, 10/27, 10/29).  Pt reported dark stools 10/15 but color has normalized. FOBT+. EGD 10/18 consistent with ulcerated mucosa in duodenum s/p coagulation. Concern for H. Pylori. Started Amoxicillin/levofloxacin (10/19, complete 11/2)). Remains on Protonix 40 mg BID. Biopsy negative for infection and CMV. Will cont close monitoring H/H, transfuse as needed with goal Hb > 7.0.  Of note, Urine cx + Klebsiella pneumoniae (10/13). Received 3 day course of ciprofloxacin, dc'd 10/19.   On 10/19 pm pt c/o crushing chest pain. Troponin 0.094, likely 2/2 ischemic stress. EKG NSR.     Interval history: no acute events overnight. Pt reports feeling well this am. Cr level noted with only minimal increases. Cont lasix 40 BID. H/H stable. Re-consulted GI for f/u EGD given appearance of previous endoscopy. Pt remains on protonix 40 BID. Denies melena. Cont to hold heparin. WBC cont to decrease. Immunosuppression managed with hydrocortisone 50 (cont to hold Cellcept, bactrim, valcyte). Prograf now restarted and will transition IV steroids to PO prednisone. CMV PCR noted with 129. Cont to monitor.     Scheduled Meds:   docusate sodium  100 mg Oral Daily    entecavir  0.5 mg Oral Q72H    fluconazole  200 mg Oral Daily    furosemide  40 mg Intravenous BID    insulin aspart U-100  10-12 Units Subcutaneous TIDWM    magnesium oxide  800 mg Oral BID    midodrine  15 mg Oral TID     mirtazapine  7.5 mg Oral QHS    pantoprazole  40 mg Intravenous BID    polyethylene glycol  17 g Oral Daily    predniSONE  20 mg Oral Daily    tacrolimus  0.5 mg Oral BID    ursodiol  300 mg Oral BID     Continuous Infusions:   sodium chloride 0.9% 1,000 mL (10/31/18 2123)     PRN Meds:bisacodyl, bisacodyl, calcium carbonate, dextrose 50%, dextrose 50%, glucagon (human recombinant), glucose, glucose, insulin aspart U-100, ondansetron, ondansetron, oxyCODONE, oxyCODONE, polyethylene glycol, promethazine (PHENERGAN) IVPB, sodium chloride 0.9%    Review of Systems   Constitutional: Positive for activity change, appetite change (improving) and fatigue. Negative for fever.   Respiratory: Negative for cough and shortness of breath.    Cardiovascular: Positive for leg swelling. Negative for chest pain.   Gastrointestinal: Positive for abdominal distention and abdominal pain. Negative for blood in stool, constipation, diarrhea, nausea and vomiting.   Genitourinary: Negative for decreased urine volume, difficulty urinating and dysuria.   Musculoskeletal: Negative for arthralgias and back pain.   Skin: Positive for wound.   Allergic/Immunologic: Positive for immunocompromised state.   Neurological: Positive for weakness. Negative for tremors and headaches.   Psychiatric/Behavioral: Negative for confusion and sleep disturbance.     Objective:     Vital Signs (Most Recent):  Temp: 97.7 °F (36.5 °C) (11/03/18 0716)  Pulse: 84 (11/03/18 0716)  Resp: 14 (11/03/18 0716)  BP: 117/71 (11/03/18 0716)  SpO2: 99 % (11/03/18 0726) Vital Signs (24h Range):  Temp:  [97.6 °F (36.4 °C)-98.1 °F (36.7 °C)] 97.7 °F (36.5 °C)  Pulse:  [] 84  Resp:  [11-19] 14  SpO2:  [97 %-100 %] 99 %  BP: (113-130)/(70-78) 117/71     Weight: 74 kg (163 lb 2.3 oz)  Body mass index is 28.9 kg/m².    Intake/Output - Last 3 Shifts       11/01 0700 - 11/02 0659 11/02 0700 - 11/03 0659 11/03 0700 - 11/04 0659    P.O. 1320 1170     I.V. (mL/kg) 600 (8.1)       Total Intake(mL/kg) 1920 (25.8) 1170 (15.8)     Urine (mL/kg/hr) 350 (0.2) 1070 (0.6)     Other 848      Stool 0 0     Total Output 1198 1070     Net +722 +100            Stool Occurrence 1 x 3 x           Physical Exam   Constitutional: She is oriented to person, place, and time.   Temporal and distal extremity muscle wasting   Eyes: EOM are normal. Pupils are equal, round, and reactive to light. Scleral icterus is present.   Neck: Normal range of motion.   Cardiovascular: Normal rate and regular rhythm.   No murmur heard.  Pulmonary/Chest: Effort normal and breath sounds normal. No respiratory distress. She has no wheezes.   Abdominal: Soft. Bowel sounds are normal. She exhibits distension. There is tenderness. There is no guarding.   Chevron incision w/ staples, CDI  Leakage from previous RLQ VALORIE site   Musculoskeletal: Normal range of motion. She exhibits edema (3+ BLE).   Neurological: She is alert and oriented to person, place, and time.   Skin: Skin is warm and dry.   Nursing note and vitals reviewed.      Laboratory:  Immunosuppressants         Stop Route Frequency     tacrolimus capsule 0.5 mg      -- Oral 2 times daily        CBC:   Recent Labs   Lab 11/03/18  0530   WBC 1.93*   RBC 2.31*   HGB 6.9*   HCT 20.9*   PLT 67*   MCV 91   MCH 29.9   MCHC 33.0     CMP:   Recent Labs   Lab 11/03/18  0530   *   CALCIUM 8.4*   ALBUMIN 2.5*   PROT 4.1*   *   K 3.7   CO2 23   CL 99   BUN 73*   CREATININE 1.7*   ALKPHOS 114   ALT 14   AST 17   BILITOT 2.1*     Labs within the past 24 hours have been reviewed.    Diagnostic Results:  I have personally reviewed all pertinent imaging studies.    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   - POD 1 US: increased arterial resistive indices, suggestive of edema vs cute rejection vs  congestion.  - Repeat US 10/9 with continued elevation of RIs.  - LFTs stable.  - T bili trending down.  - repeat liver US (10/11): elevated RIs.  -Now w/ Post LTx w/ EVA and possible slow oozing GIB.            Leukopenia    -Stopped Cellcept, Bactrim, and Valcyte 10/31--> will resume once WBC improves and stable  -CMV PCR noted to be positive at 129. Will discuss possible treatment.   -Monitor        Azotemia    CRRT 10/31 for uremic toxin clearance  BUN trending back up  Monitor        Duodenal ulcer    - EGD 10/18- sig for ulcerated duodenal mucosa.   - Biopsy 10/18- no infection detected.  - Received 14 day course of amoxicillin/levo (completed 11/2) for potential H. Pylori.   - Cont protonix 40 BID.  - Plan for repeat EGD on Monday per GI.          UTI (urinary tract infection)    - Urine Cx 10/15 w/ Klebsiella pneumonia.  - Started Ciprofloxacin (10/17-10/19).   - Started on amoxicillin/levo for H. Pylori (completed abx 11/2)  - Pt asymptomatic at this time.       Acute blood loss anemia    - H&H cont to fluctuate.   - FOBT +  - EGD 10/18- showed diffuse bleeding with ulcerated duodenal mucosa   - Monitoring w/ CBC q 12 to assess the need for transfusion.  - 1 unit PRBCs transfused 10/27. H&H stable today.            Post LT - Acute renal failure    - Creatinine on arrival 0.8 and trended up.  - Nephrology consulted.  - Renal US (10/11): no hydronephrosis.  - Echo 10/12: diastolic dysfunction, likely 2/2 to ARF.  - CRRT 10/14; SLED 10/17, 10/20. --> no improvements noted in kidney fxn.  -24 hr urine creatine collection :  CrCl of 7.   -CRRT 10/31 with 1L removed.   -Cont Lasix 40 BID (restarted 11/1)     Hypervolemia associated with renal insufficiency    - CRRT 10/14, 10/17, 10/20, 10/31  -CXR 10/30: abundant fluid in b/l pleural space  -Restarted Lasix 40 BID (11/2)       EVA (acute kidney injury)           Metabolic acidosis    - CRRT 10/14.  - SLED 10/17, 10/20, 10/31         At risk for opportunistic  infections    - Valcyte for CMV prophylaxis--> holding (10/31) for leukopenia. Pt will need weekly CMV PCR while discontinued.  - Bactrim for PCP prophylaxis (MWF).--> holding (10/31) for leukopenia  - Fluconazole for fungal prophylaxis.         Long-term use of immunosuppressant medication    -  Prograf: stopped 10/29. Resumed 11/2.  --> Will monitor for signs of toxic side effects, check daily troughs, and change meds accordingly.  -Prednisone: stopped 10/29. Started on Hydrocortisone  - Cellcept: stopped 10/31 for leukopenia. Will resume after improvements in WBC         Prophylactic immunotherapy    - See long term use immunosuppresion.       Hypokalemia           Ascites    - paracentesis 10/1, 5L removed, no fluid sent for analysis.  -pt remains volume overloaded.        Weakness    - see physical decond.       Physical deconditioning    - PT/OT consulted.  - Recommend home health PT and rolling walker at discharge.       Hyponatremia    - Na 119 on admit.  - Na was improving post transplant w HD.  - Na stable at this time.              Hypotension    - continue Midodrine 15 mg TID. Monitor.       Anemia requiring transfusions    - Fluctuating H/H.    - Transfusions: 10/14; 10/16; 10/18; 10/19; 10/22; 10/27; 10/29, 10/31  - FOBT +  - LDH elevated, Hapto < 10.  - EGD 10/18: ulcerated duodenal mucosa.   - Monitoring w/ CBC q12hr to assess the need for transfusion.  - H/H stable. No transfusion needed at this time. Cont to monitor  -Consulted GI about this ongoing issue and further need of another EGD         Severe malnutrition    - dietary consulted.   - temporal and distal extremity muscle wasting and edema.  - encourage supplements and to increase nutritional intake .  - per nephrology, pt to be on low protein diet.   - Prealbumin reviewed.      Type 2 diabetes mellitus with diabetic polyneuropathy, with long-term current use of insulin    - continue home regimen.  - endocrine consulted.  - Pt off insulin  drip at this time.  Apprec endo recs.        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             VTE Risk Mitigation (From admission, onward)        Ordered     Place sequential compression device  Until discontinued      10/05/18 0709          The patients clinical status was discussed at multidisplinary rounds, involving transplant surgery, transplant medicine, pharmacy, nursing, nutrition, and social work    Discharge Planning:  Not a candidate for d/c at this time.     Fred Cruz NP  Liver Transplant  [facility]

## 2018-11-03 NOTE — ASSESSMENT & PLAN NOTE
- Valcyte for CMV prophylaxis--> holding (10/31) for leukopenia. Pt will need weekly CMV PCR while discontinued.  - Bactrim for PCP prophylaxis ([facility]).--> holding (10/31) for leukopenia  - Fluconazole for fungal prophylaxis.

## 2018-11-03 NOTE — ASSESSMENT & PLAN NOTE
- Urine Cx 10/15 w/ Klebsiella pneumonia.  - Started Ciprofloxacin (10/17-10/19).   - Started on amoxicillin/levo for H. Pylori (completed abx 11/2)  - Pt asymptomatic at this time.

## 2018-11-03 NOTE — SUBJECTIVE & OBJECTIVE
Scheduled Meds:   docusate sodium  100 mg Oral Daily    entecavir  0.5 mg Oral Q72H    fluconazole  200 mg Oral Daily    furosemide  40 mg Intravenous BID    insulin aspart U-100  10-12 Units Subcutaneous TIDWM    magnesium oxide  800 mg Oral BID    midodrine  15 mg Oral TID    mirtazapine  7.5 mg Oral QHS    pantoprazole  40 mg Intravenous BID    polyethylene glycol  17 g Oral Daily    predniSONE  20 mg Oral Daily    tacrolimus  0.5 mg Oral BID    ursodiol  300 mg Oral BID     Continuous Infusions:   sodium chloride 0.9% 1,000 mL (10/31/18 2123)     PRN Meds:bisacodyl, bisacodyl, calcium carbonate, dextrose 50%, dextrose 50%, glucagon (human recombinant), glucose, glucose, insulin aspart U-100, ondansetron, ondansetron, oxyCODONE, oxyCODONE, polyethylene glycol, promethazine (PHENERGAN) IVPB, sodium chloride 0.9%    Review of Systems   Constitutional: Positive for activity change, appetite change (improving) and fatigue. Negative for fever.   Respiratory: Negative for cough and shortness of breath.    Cardiovascular: Positive for leg swelling. Negative for chest pain.   Gastrointestinal: Positive for abdominal distention and abdominal pain. Negative for blood in stool, constipation, diarrhea, nausea and vomiting.   Genitourinary: Negative for decreased urine volume, difficulty urinating and dysuria.   Musculoskeletal: Negative for arthralgias and back pain.   Skin: Positive for wound.   Allergic/Immunologic: Positive for immunocompromised state.   Neurological: Positive for weakness. Negative for tremors and headaches.   Psychiatric/Behavioral: Negative for confusion and sleep disturbance.     Objective:     Vital Signs (Most Recent):  Temp: 97.7 °F (36.5 °C) (11/03/18 0716)  Pulse: 84 (11/03/18 0716)  Resp: 14 (11/03/18 0716)  BP: 117/71 (11/03/18 0716)  SpO2: 99 % (11/03/18 0726) Vital Signs (24h Range):  Temp:  [97.6 °F (36.4 °C)-98.1 °F (36.7 °C)] 97.7 °F (36.5 °C)  Pulse:  [] 84  Resp:   [11-19] 14  SpO2:  [97 %-100 %] 99 %  BP: (113-130)/(70-78) 117/71     Weight: 74 kg (163 lb 2.3 oz)  Body mass index is 28.9 kg/m².    Intake/Output - Last 3 Shifts       11/01 0700 - 11/02 0659 11/02 0700 - 11/03 0659 11/03 0700 - 11/04 0659    P.O. 1320 1170     I.V. (mL/kg) 600 (8.1)      Total Intake(mL/kg) 1920 (25.8) 1170 (15.8)     Urine (mL/kg/hr) 350 (0.2) 1070 (0.6)     Other 848      Stool 0 0     Total Output 1198 1070     Net +722 +100            Stool Occurrence 1 x 3 x           Physical Exam   Constitutional: She is oriented to person, place, and time.   Temporal and distal extremity muscle wasting   Eyes: EOM are normal. Pupils are equal, round, and reactive to light. Scleral icterus is present.   Neck: Normal range of motion.   Cardiovascular: Normal rate and regular rhythm.   No murmur heard.  Pulmonary/Chest: Effort normal and breath sounds normal. No respiratory distress. She has no wheezes.   Abdominal: Soft. Bowel sounds are normal. She exhibits distension. There is tenderness. There is no guarding.   Chevron incision w/ staples, CDI  Leakage from previous RLQ VALORIE site   Musculoskeletal: Normal range of motion. She exhibits edema (3+ BLE).   Neurological: She is alert and oriented to person, place, and time.   Skin: Skin is warm and dry.   Nursing note and vitals reviewed.      Laboratory:  Immunosuppressants         Stop Route Frequency     tacrolimus capsule 0.5 mg      -- Oral 2 times daily        CBC:   Recent Labs   Lab 11/03/18  0530   WBC 1.93*   RBC 2.31*   HGB 6.9*   HCT 20.9*   PLT 67*   MCV 91   MCH 29.9   MCHC 33.0     CMP:   Recent Labs   Lab 11/03/18  0530   *   CALCIUM 8.4*   ALBUMIN 2.5*   PROT 4.1*   *   K 3.7   CO2 23   CL 99   BUN 73*   CREATININE 1.7*   ALKPHOS 114   ALT 14   AST 17   BILITOT 2.1*     Labs within the past 24 hours have been reviewed.    Diagnostic Results:  I have personally reviewed all pertinent imaging studies.

## 2018-11-03 NOTE — PROGRESS NOTES
[facility]  Nephrology  Progress Note    Patient Name: Scarlett Reddy  MRN: [id number]  Admission Date: 10/1/2018  Hospital Length of Stay: 32 days  Attending Provider: Jani Theodore MD   Primary Care Physician: Primary Doctor No  Principal Problem:Liver transplanted    Subjective:     HPI: 67 y/o female with ESLD due to Hep B and D cirrhosis MELD of 23 on 10/01/18.  Paracentesis 10/1 with 5L removed, no fluid sent for analysis. 10/02 significant for hyponatremia, Na 119 and she was admitted to LTS for sodium monitoring. On 10/05/18 she is s/p DBDLT (CMV D+/R+, steroid induction, MMF/tacro/pred maintenance) with take back on 10/6 secondary to hyperbilirubinema, no leak identified. Post-op course complicated by hypercapneic and hypoxic respiratory failure and was intubated 10/06 and extubated 10/07. Liver bx (10/6) sig for Zone 3 hemorrhage and collapse, in addition to cholestasis. ID consulted to comment on positive diphtheroid culture from ascitic fluid (likely skin colonization) as well as ESBL Klebsiella pneumoniae in urine culture (10/4).  Pt asymptomatic and cell count from ascitic fluid unremarkable. Mild peritransplant ascites- repeat US 10/6 with increased arterial resistive indices. Repeat US 10/9 w/ continued elevation in RIs and fluid collections. LFTs trending down nicely.     Patient is currently on entecavir 0.5 mg, Hepagam, mycophenalate 1000 mg BID, prednisone 20 mg and prophylaxis with Bactrim, fluconazole and Valgancyclovir.    Nephrology consulted for acute kidney failure is setting of Orthotopic Liver Transplant. Cr 0.8 on 10/05, started to trend up and 1.5 (10/09), 1.8 (10/10), and 1.9 (10/11). UOP 10/10/18: 520 ml. UA (10/09): 1+ occult blood and 1+ protein with RBC and amorphous casts.        Interval History: Patient this am complaining of leaking of fluid from her body as she has been having for the last week. She also endorses distension in the abdomen that is increasing.  She states she urinates well and has not had problems with urination. No transfusions yesterday , given lasix 40 mg IV BID,  Urine output around 1L. sCr increased to 1.7 from 1.6, BUN increased to 73 from 70, noted drop in H/H as well     Review of patient's allergies indicates:  No Known Allergies  Current Facility-Administered Medications   Medication Frequency    0.9%  NaCl infusion (CRRT USE ONLY) Continuous    bisacodyl EC tablet 10 mg Daily PRN    bisacodyl suppository 10 mg Daily PRN    calcium carbonate 200 mg calcium (500 mg) chewable tablet 500 mg TID PRN    dextrose 50% injection 12.5 g PRN    dextrose 50% injection 25 g PRN    docusate sodium capsule 100 mg Daily    entecavir tablet 0.5 mg Q72H    fluconazole tablet 200 mg Daily    furosemide injection 40 mg BID    glucagon (human recombinant) injection 1 mg PRN    glucose chewable tablet 16 g PRN    glucose chewable tablet 24 g PRN    hydrocortisone sodium succinate injection 50 mg Q8H    insulin aspart U-100 pen 0-5 Units QID (AC + HS) PRN    insulin aspart U-100 pen 10-12 Units TIDWM    magnesium oxide tablet 800 mg BID    midodrine tablet 15 mg TID    mirtazapine tablet 7.5 mg QHS    ondansetron disintegrating tablet 8 mg Q8H PRN    ondansetron injection 4 mg Q8H PRN    oxyCODONE immediate release tablet 5 mg Q4H PRN    oxyCODONE immediate release tablet Tab 10 mg Q4H PRN    pantoprazole injection 40 mg BID    polyethylene glycol packet 17 g Daily PRN    polyethylene glycol packet 17 g Daily    promethazine (PHENERGAN) 6.25 mg in dextrose 5 % 50 mL IVPB Q6H PRN    sodium chloride 0.9% flush 3 mL PRN    tacrolimus capsule 0.5 mg BID    ursodiol capsule 300 mg BID       Objective:     Vital Signs (Most Recent):  Temp: 97.7 °F (36.5 °C) (11/03/18 0716)  Pulse: 84 (11/03/18 0716)  Resp: 14 (11/03/18 0716)  BP: 117/71 (11/03/18 0716)  SpO2: 99 % (11/03/18 0726)  O2 Device (Oxygen Therapy): nasal cannula (11/03/18 0726) Vital  Signs (24h Range):  Temp:  [97.6 °F (36.4 °C)-98.1 °F (36.7 °C)] 97.7 °F (36.5 °C)  Pulse:  [] 84  Resp:  [11-19] 14  SpO2:  [97 %-100 %] 99 %  BP: (113-130)/(70-78) 117/71     Weight: 74 kg (163 lb 2.3 oz) (11/03/18 0500)  Body mass index is 28.9 kg/m².  Body surface area is 1.81 meters squared.    I/O last 3 completed shifts:  In: 1530 [P.O.:1530]  Out: 1420 [Urine:1420]    Physical Exam   Constitutional: She is oriented to person, place, and time.   Temporal and distal extremity muscle wasting   Eyes: EOM are normal. Pupils are equal, round, and reactive to light. Scleral icterus is present.   Neck: Normal range of motion.   Cardiovascular: Normal rate and regular rhythm.   No murmur heard.  Pulmonary/Chest: Effort normal and breath sounds normal. No respiratory distress. She has no wheezes.   Abdominal: Soft. Bowel sounds are normal. She exhibits distension (increased from yesterday 11/2). There is tenderness. There is no guarding.   Chevron incision w/ staples, CDI   Musculoskeletal: Normal range of motion. She exhibits edema (3+ BLE).   Neurological: She is alert and oriented to person, place, and time.   Skin: Skin is warm and dry.   Nursing note and vitals reviewed.      Significant Labs:  CBC:   Recent Labs   Lab 11/03/18  0530   WBC 1.93*   RBC 2.31*   HGB 6.9*   HCT 20.9*   PLT 67*   MCV 91   MCH 29.9   MCHC 33.0     CMP:   Recent Labs   Lab 11/03/18  0530   *   CALCIUM 8.4*   ALBUMIN 2.5*   PROT 4.1*   *   K 3.7   CO2 23   CL 99   BUN 73*   CREATININE 1.7*   ALKPHOS 114   ALT 14   AST 17   BILITOT 2.1*     All labs within the past 24 hours have been reviewed.     Significant Imaging:  Labs: Reviewed    Assessment/Plan:      Post LT - Acute renal failure    Post-LT EVA is multifactorial in origin and has been related to the severity of liver disease, toxicity of immunosuppressive therapy and hepatic ischaemia reperfusion injury might be a driving force in the aetiology of post-LT EVA.       - acute gradual worsening of renal function s/p OLT  - 10/06 was taken to the OR for possibility of billiary leak and post op complication due to hypercapnic hypoxic respiratory failure, no blood pressure drop intraoperatively per anesthesiology notes. Patient has not been needing vasopressors to maintain BP  - Creatinine on arrival 0.8 and has been up trending   - BUN/cr (10/11/18): 91/1.9; (10/12/18): 100/2.0; (10/13/18): 110/2.4  - Tacrolimus 26.9 (10/08); 6.8 (10/12); 5.6 (10/13), remains in none nephrotoxic levels   - Protein Creatine ratio: 0.82  - UA consistent with hematuria, proteinuria, RBC and amorphous casts   - Fena 0.5%; consistent with hypovolemia   - Renal US negative for obstruction, stricture or, hydronephrosis    Assessment: Acute Renal failure in setting of hepatic ischemia reperfusion injury and poor perfusion status vs acute tubular necrosis from immunosuppressive agents      Plan:  -SLED for uremic toxin clearance was perfromed last 10/31 , No indication for CRRT today   - Recommend getting a chest xray, if patient with pulmonary edema can give lasix if not would recommend no lasix and reevaluation of GI bleed  -   Recheck afternoon CBC as patient is hypoperfusing and would need blood  - Please document urine output  -Patient azotemia likely secondary to her H/H dropping from GI bleed  -Renal diet    - avoid nephrotoxic drugs, ACEI/ARB, NSAIDS  - adjust drugs to GFR  - Agree with fluid restriction of 1500 cc/day  - Strict I/O's       This plan was discussed with patient and family at bedside using the [facility]                                   Thank you for your consult. I will follow-up with patient. Please contact us if you have any additional questions.    Vaishali Ji MD  Nephrology  [facility]

## 2018-11-03 NOTE — PLAN OF CARE
Problem: Patient Care Overview  Goal: Plan of Care Review  Outcome: Ongoing (interventions implemented as appropriate)  Pt is AAOx3. Communication assistance provided by Burmese interpretor. Small amount of leakage to right side of abd dsg changed. Pt received 1 unit of PRBC, no adverse reaction noted. BG recorded ACHS mealtime insulin administered. On telemetry pt remains SR. Ambulated in room x3 this shift. Denies pain, nausea, and dyspnea. This RN instructed the pt on the importance of hand hygiene, pt verbalized understanding. Pt turns self in bed independently no signs of skin breakdown noted. Fall precautions in place, side rails upx3, non slip footwear applied, pt instructed to call for assistance before attempting to ambulate, pt verbalized understanding. Call bell within reach, family present at bedside.

## 2018-11-03 NOTE — SUBJECTIVE & OBJECTIVE
"Interval HPI:   Overnight events: Remains in TSU. BG slightly above goal overnight.  Hydrocortisone 50 mg q 8. Creatinine trending up, 1.7 today.  Eating:   {Blank single:68079::"100%","50%","<25%","NPO"}  Nausea: No  Hypoglycemia and intervention: No  Fever: No  TPN and/or TF: No    /71 (BP Location: Right arm, Patient Position: Lying)   Pulse 84   Temp 97.7 °F (36.5 °C) (Oral)   Resp 14   Ht 5' 3" (1.6 m)   Wt 74 kg (163 lb 2.3 oz)   LMP  (LMP Unknown)   SpO2 99%   Breastfeeding? No   BMI 28.90 kg/m²     Labs Reviewed and Include    Recent Labs   Lab 11/03/18  0530   *   CALCIUM 8.4*   ALBUMIN 2.5*   PROT 4.1*   *   K 3.7   CO2 23   CL 99   BUN 73*   CREATININE 1.7*   ALKPHOS 114   ALT 14   AST 17   BILITOT 2.1*     Lab Results   Component Value Date    WBC 1.93 (LL) 11/03/2018    HGB 6.9 (L) 11/03/2018    HCT 20.9 (L) 11/03/2018    MCV 91 11/03/2018    PLT 67 (L) 11/03/2018     No results for input(s): TSH, FREET4 in the last 168 hours.  Lab Results   Component Value Date    HGBA1C 6.0 (H) 10/04/2018       Nutritional status:   Body mass index is 28.9 kg/m².  Lab Results   Component Value Date    ALBUMIN 2.5 (L) 11/03/2018    ALBUMIN 2.5 (L) 11/02/2018    ALBUMIN 2.6 (L) 11/01/2018     Lab Results   Component Value Date    PREALBUMIN 15 (L) 11/03/2018    PREALBUMIN 12 (L) 09/17/2018       Estimated Creatinine Clearance: 30.1 mL/min (A) (based on SCr of 1.7 mg/dL (H)).    Accu-Checks  Recent Labs     10/31/18  1251 10/31/18  1807 10/31/18  2248 11/01/18  1001 11/01/18  1357 11/01/18  1806 11/01/18  2315 11/02/18  0757 11/02/18  1423 11/02/18  1916   POCTGLUCOSE 197* 90 168* 167* 246* 258* 289* 258* 195* 187*       Current Medications and/or Treatments Impacting Glycemic Control  Immunotherapy:    Immunosuppressants         Stop Route Frequency     tacrolimus capsule 0.5 mg      -- Oral 2 times daily        Steroids:   Hormones (From admission, onward)    Start     Stop Route Frequency " Ordered    10/31/18 1400  hydrocortisone sodium succinate injection 50 mg      -- IV Every 8 hours 10/31/18 1043    10/17/18 0945  predniSONE tablet 15 mg      10/24 0859 Oral Daily 10/17/18 0943        Pressors:    Autonomic Drugs (From admission, onward)    Start     Stop Route Frequency Ordered    10/10/18 1500  midodrine tablet 15 mg      -- Oral 3 times daily 10/10/18 1122    10/06/18 1617  rocuronium (ZEMURON) 10 mg/mL injection     Comments:  Created by cabinet override    10/07 0429   10/06/18 1617        Hyperglycemia/Diabetes Medications:   Antihyperglycemics (From admission, onward)    Start     Stop Route Frequency Ordered    11/02/18 0730  insulin aspart U-100 pen 10-12 Units      -- SubQ 3 times daily with meals 11/02/18 0707    10/14/18 0925  insulin aspart U-100 pen 0-5 Units      -- SubQ Before meals & nightly PRN 10/14/18 0825    10/07/18 1200  insulin regular (Humulin R) 100 Units in sodium chloride 0.9% 100 mL infusion      10/13 2100 IV Continuous 10/07/18 105

## 2018-11-03 NOTE — SUBJECTIVE & OBJECTIVE
Interval History: Patient this am complaining of leaking of fluid from her body as she has been having for the last week. She also endorses distension in the abdomen that is increasing. She states she urinates well and has not had problems with urination. No transfusions yesterday , given lasix 40 mg IV BID,  Urine output around 1L. sCr increased to 1.7 from 1.6, BUN increased to 73 from 70, noted drop in H/H as well     Review of patient's allergies indicates:  No Known Allergies  Current Facility-Administered Medications   Medication Frequency    0.9%  NaCl infusion (CRRT USE ONLY) Continuous    bisacodyl EC tablet 10 mg Daily PRN    bisacodyl suppository 10 mg Daily PRN    calcium carbonate 200 mg calcium (500 mg) chewable tablet 500 mg TID PRN    dextrose 50% injection 12.5 g PRN    dextrose 50% injection 25 g PRN    docusate sodium capsule 100 mg Daily    entecavir tablet 0.5 mg Q72H    fluconazole tablet 200 mg Daily    furosemide injection 40 mg BID    glucagon (human recombinant) injection 1 mg PRN    glucose chewable tablet 16 g PRN    glucose chewable tablet 24 g PRN    hydrocortisone sodium succinate injection 50 mg Q8H    insulin aspart U-100 pen 0-5 Units QID (AC + HS) PRN    insulin aspart U-100 pen 10-12 Units TIDWM    magnesium oxide tablet 800 mg BID    midodrine tablet 15 mg TID    mirtazapine tablet 7.5 mg QHS    ondansetron disintegrating tablet 8 mg Q8H PRN    ondansetron injection 4 mg Q8H PRN    oxyCODONE immediate release tablet 5 mg Q4H PRN    oxyCODONE immediate release tablet Tab 10 mg Q4H PRN    pantoprazole injection 40 mg BID    polyethylene glycol packet 17 g Daily PRN    polyethylene glycol packet 17 g Daily    promethazine (PHENERGAN) 6.25 mg in dextrose 5 % 50 mL IVPB Q6H PRN    sodium chloride 0.9% flush 3 mL PRN    tacrolimus capsule 0.5 mg BID    ursodiol capsule 300 mg BID       Objective:     Vital Signs (Most Recent):  Temp: 97.7 °F (36.5 °C)  (11/03/18 0716)  Pulse: 84 (11/03/18 0716)  Resp: 14 (11/03/18 0716)  BP: 117/71 (11/03/18 0716)  SpO2: 99 % (11/03/18 0726)  O2 Device (Oxygen Therapy): nasal cannula (11/03/18 0726) Vital Signs (24h Range):  Temp:  [97.6 °F (36.4 °C)-98.1 °F (36.7 °C)] 97.7 °F (36.5 °C)  Pulse:  [] 84  Resp:  [11-19] 14  SpO2:  [97 %-100 %] 99 %  BP: (113-130)/(70-78) 117/71     Weight: 74 kg (163 lb 2.3 oz) (11/03/18 0500)  Body mass index is 28.9 kg/m².  Body surface area is 1.81 meters squared.    I/O last 3 completed shifts:  In: 1530 [P.O.:1530]  Out: 1420 [Urine:1420]    Physical Exam   Constitutional: She is oriented to person, place, and time.   Temporal and distal extremity muscle wasting   Eyes: EOM are normal. Pupils are equal, round, and reactive to light. Scleral icterus is present.   Neck: Normal range of motion.   Cardiovascular: Normal rate and regular rhythm.   No murmur heard.  Pulmonary/Chest: Effort normal and breath sounds normal. No respiratory distress. She has no wheezes.   Abdominal: Soft. Bowel sounds are normal. She exhibits distension (increased from yesterday 11/2). There is tenderness. There is no guarding.   Chevron incision w/ staples, CDI   Musculoskeletal: Normal range of motion. She exhibits edema (3+ BLE).   Neurological: She is alert and oriented to person, place, and time.   Skin: Skin is warm and dry.   Nursing note and vitals reviewed.      Significant Labs:  CBC:   Recent Labs   Lab 11/03/18  0530   WBC 1.93*   RBC 2.31*   HGB 6.9*   HCT 20.9*   PLT 67*   MCV 91   MCH 29.9   MCHC 33.0     CMP:   Recent Labs   Lab 11/03/18  0530   *   CALCIUM 8.4*   ALBUMIN 2.5*   PROT 4.1*   *   K 3.7   CO2 23   CL 99   BUN 73*   CREATININE 1.7*   ALKPHOS 114   ALT 14   AST 17   BILITOT 2.1*     All labs within the past 24 hours have been reviewed.     Significant Imaging:  Labs: Reviewed

## 2018-11-03 NOTE — ASSESSMENT & PLAN NOTE
- Fluctuating H/H.    - Transfusions: 10/14; 10/16; 10/18; 10/19; 10/22; 10/27; 10/29, 10/31  - FOBT +  - LDH elevated, Hapto < 10.  - EGD 10/18: ulcerated duodenal mucosa.   - Monitoring w/ CBC q12hr to assess the need for transfusion.  - H/H stable. No transfusion needed at this time. Cont to monitor  -Consulted GI about this ongoing issue and further need of another EGD

## 2018-11-03 NOTE — ASSESSMENT & PLAN NOTE
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   - POD 1 US: increased arterial resistive indices, suggestive of edema vs cute rejection vs congestion.  - Repeat US 10/9 with continued elevation of RIs.  - LFTs stable.  - T bili trending down.  - repeat liver US (10/11): elevated RIs.  -Now w/ Post LTx w/ EVA and possible slow oozing GIB.

## 2018-11-03 NOTE — PLAN OF CARE
Problem: Patient Care Overview  Goal: Plan of Care Review  Outcome: Ongoing (interventions implemented as appropriate)  Pt has call bell in reach, non slip socks on, and bedrails up x2. Pt has  at bedside. Pt working with PT/OT during the day. Pt has labs in am. Pt accuchecks ac/hs. Pt encouraged to wash hands. Pt has BSC with family walking assistance.

## 2018-11-03 NOTE — ASSESSMENT & PLAN NOTE
- EGD 10/18- sig for ulcerated duodenal mucosa.   - Biopsy 10/18- no infection detected.  - Received 14 day course of amoxicillin/levo (completed 11/2) for potential H. Pylori.   - Cont protonix 40 BID.  - Plan for repeat EGD on Monday per GI.

## 2018-11-03 NOTE — ASSESSMENT & PLAN NOTE
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      - acute gradual worsening of renal function s/p OLT  - 10/06 was taken to the OR for possibility of billiary leak and post op complication due to hypercapnic hypoxic respiratory failure, no blood pressure drop intraoperatively per anesthesiology notes. Patient has not been needing vasopressors to maintain BP  - Creatinine on arrival 0.8 and has been up trending   - BUN/cr (10/11/18): 91/1.9; (10/12/18): 100/2.0; (10/13/18): 110/2.4  - Tacrolimus 26.9 (10/08); 6.8 (10/12); 5.6 (10/13), remains in none nephrotoxic levels   - Protein Creatine ratio: 0.82  - UA consistent with hematuria, proteinuria, RBC and amorphous casts   - Fena 0.5%; consistent with hypovolemia   - Renal US negative for obstruction, stricture or, hydronephrosis    Assessment: Acute Renal failure in setting of hepatic ischemia reperfusion injury and poor perfusion status vs acute tubular necrosis from immunosuppressive agents      Plan:  -SLED for uremic toxin clearance was perfromed last 10/31 , No indication for CRRT today   - Recommend getting a chest xray, if patient with pulmonary edema can give lasix if not would recommend no lasix and reevaluation of GI bleed  -   Recheck afternoon CBC as patient is hypoperfusing and would need blood  - Please document urine output  -Patient azotemia likely secondary to her H/H dropping from GI bleed  -Renal diet    - avoid nephrotoxic drugs, ACEI/ARB, NSAIDS  - adjust drugs to GFR  - Agree with fluid restriction of 1500 cc/day  - Strict I/O's       This plan was discussed with patient and family at bedside using the [facility]

## 2018-11-03 NOTE — ASSESSMENT & PLAN NOTE
- dietary consulted.   - temporal and distal extremity muscle wasting and edema.  - encourage supplements and to increase nutritional intake .  - per nephrology, pt to be on low protein diet.   - Prealbumin reviewed.

## 2018-11-04 LAB
ALBUMIN SERPL BCP-MCNC: 2.4 G/DL
ALP SERPL-CCNC: 113 U/L
ALT SERPL W/O P-5'-P-CCNC: 14 U/L
ANION GAP SERPL CALC-SCNC: 11 MMOL/L
AST SERPL-CCNC: 16 U/L
BASOPHILS # BLD AUTO: 0 K/UL
BASOPHILS NFR BLD: 0 %
BILIRUB SERPL-MCNC: 2 MG/DL
BUN SERPL-MCNC: 76 MG/DL
CALCIUM SERPL-MCNC: 8.4 MG/DL
CHLORIDE SERPL-SCNC: 99 MMOL/L
CO2 SERPL-SCNC: 23 MMOL/L
CREAT SERPL-MCNC: 1.9 MG/DL
DIFFERENTIAL METHOD: ABNORMAL
EOSINOPHIL # BLD AUTO: 0 K/UL
EOSINOPHIL NFR BLD: 0 %
ERYTHROCYTE [DISTWIDTH] IN BLOOD BY AUTOMATED COUNT: 17.1 %
EST. GFR  (AFRICAN AMERICAN): 30.6 ML/MIN/1.73 M^2
EST. GFR  (NON AFRICAN AMERICAN): 26.5 ML/MIN/1.73 M^2
GLUCOSE SERPL-MCNC: 224 MG/DL
HCT VFR BLD AUTO: 27.3 %
HGB BLD-MCNC: 8.7 G/DL
IMM GRANULOCYTES # BLD AUTO: 0.05 K/UL
IMM GRANULOCYTES NFR BLD AUTO: 2.6 %
INR PPP: 1.1
LYMPHOCYTES # BLD AUTO: 0.2 K/UL
LYMPHOCYTES NFR BLD: 8.3 %
MAGNESIUM SERPL-MCNC: 2 MG/DL
MCH RBC QN AUTO: 29 PG
MCHC RBC AUTO-ENTMCNC: 31.9 G/DL
MCV RBC AUTO: 91 FL
MONOCYTES # BLD AUTO: 0.2 K/UL
MONOCYTES NFR BLD: 10.4 %
NEUTROPHILS # BLD AUTO: 1.5 K/UL
NEUTROPHILS NFR BLD: 78.7 %
NRBC BLD-RTO: 0 /100 WBC
PHOSPHATE SERPL-MCNC: 4 MG/DL
PLATELET # BLD AUTO: 64 K/UL
PMV BLD AUTO: 10.1 FL
POCT GLUCOSE: 233 MG/DL (ref 70–110)
POCT GLUCOSE: 255 MG/DL (ref 70–110)
POCT GLUCOSE: 289 MG/DL (ref 70–110)
POCT GLUCOSE: 293 MG/DL (ref 70–110)
POTASSIUM SERPL-SCNC: 3.7 MMOL/L
PROT SERPL-MCNC: 4.2 G/DL
PROTHROMBIN TIME: 11 SEC
RBC # BLD AUTO: 3 M/UL
SODIUM SERPL-SCNC: 133 MMOL/L
TACROLIMUS BLD-MCNC: 3.2 NG/ML
WBC # BLD AUTO: 1.92 K/UL

## 2018-11-04 PROCEDURE — 85610 PROTHROMBIN TIME: CPT

## 2018-11-04 PROCEDURE — P9047 ALBUMIN (HUMAN), 25%, 50ML: HCPCS | Mod: JG | Performed by: NURSE PRACTITIONER

## 2018-11-04 PROCEDURE — 25000003 PHARM REV CODE 250: Performed by: TRANSPLANT SURGERY

## 2018-11-04 PROCEDURE — 25000003 PHARM REV CODE 250: Performed by: STUDENT IN AN ORGANIZED HEALTH CARE EDUCATION/TRAINING PROGRAM

## 2018-11-04 PROCEDURE — 85025 COMPLETE CBC W/AUTO DIFF WBC: CPT

## 2018-11-04 PROCEDURE — 63600175 PHARM REV CODE 636 W HCPCS: Performed by: PHYSICIAN ASSISTANT

## 2018-11-04 PROCEDURE — 80197 ASSAY OF TACROLIMUS: CPT

## 2018-11-04 PROCEDURE — 94761 N-INVAS EAR/PLS OXIMETRY MLT: CPT

## 2018-11-04 PROCEDURE — 25000003 PHARM REV CODE 250: Performed by: NURSE PRACTITIONER

## 2018-11-04 PROCEDURE — 20600001 HC STEP DOWN PRIVATE ROOM

## 2018-11-04 PROCEDURE — 94664 DEMO&/EVAL PT USE INHALER: CPT

## 2018-11-04 PROCEDURE — C9113 INJ PANTOPRAZOLE SODIUM, VIA: HCPCS | Performed by: PHYSICIAN ASSISTANT

## 2018-11-04 PROCEDURE — 63600175 PHARM REV CODE 636 W HCPCS: Performed by: NURSE PRACTITIONER

## 2018-11-04 PROCEDURE — 99900035 HC TECH TIME PER 15 MIN (STAT)

## 2018-11-04 PROCEDURE — 80053 COMPREHEN METABOLIC PANEL: CPT

## 2018-11-04 PROCEDURE — 83735 ASSAY OF MAGNESIUM: CPT

## 2018-11-04 PROCEDURE — 25000003 PHARM REV CODE 250: Performed by: PHYSICIAN ASSISTANT

## 2018-11-04 PROCEDURE — 99233 SBSQ HOSP IP/OBS HIGH 50: CPT | Mod: 24,,, | Performed by: NURSE PRACTITIONER

## 2018-11-04 PROCEDURE — 94799 UNLISTED PULMONARY SVC/PX: CPT

## 2018-11-04 PROCEDURE — 84100 ASSAY OF PHOSPHORUS: CPT

## 2018-11-04 RX ORDER — ALBUMIN HUMAN 250 G/1000ML
25 SOLUTION INTRAVENOUS
Status: COMPLETED | OUTPATIENT
Start: 2018-11-04 | End: 2018-11-05

## 2018-11-04 RX ADMIN — MIDODRINE HYDROCHLORIDE 15 MG: 5 TABLET ORAL at 02:11

## 2018-11-04 RX ADMIN — MIRTAZAPINE 7.5 MG: 7.5 TABLET ORAL at 08:11

## 2018-11-04 RX ADMIN — OXYCODONE HYDROCHLORIDE 5 MG: 5 TABLET ORAL at 07:11

## 2018-11-04 RX ADMIN — INSULIN ASPART 1 UNITS: 100 INJECTION, SOLUTION INTRAVENOUS; SUBCUTANEOUS at 08:11

## 2018-11-04 RX ADMIN — INSULIN ASPART 3 UNITS: 100 INJECTION, SOLUTION INTRAVENOUS; SUBCUTANEOUS at 12:11

## 2018-11-04 RX ADMIN — URSODIOL 300 MG: 300 CAPSULE ORAL at 08:11

## 2018-11-04 RX ADMIN — PANTOPRAZOLE SODIUM 40 MG: 40 INJECTION, POWDER, FOR SOLUTION INTRAVENOUS at 08:11

## 2018-11-04 RX ADMIN — MIDODRINE HYDROCHLORIDE 15 MG: 5 TABLET ORAL at 08:11

## 2018-11-04 RX ADMIN — PREDNISONE 20 MG: 10 TABLET ORAL at 08:11

## 2018-11-04 RX ADMIN — MAGNESIUM OXIDE TAB 400 MG (241.3 MG ELEMENTAL MG) 800 MG: 400 (241.3 MG) TAB at 08:11

## 2018-11-04 RX ADMIN — DOCUSATE SODIUM 100 MG: 100 CAPSULE, LIQUID FILLED ORAL at 08:11

## 2018-11-04 RX ADMIN — INSULIN ASPART 2 UNITS: 100 INJECTION, SOLUTION INTRAVENOUS; SUBCUTANEOUS at 07:11

## 2018-11-04 RX ADMIN — TACROLIMUS 1 MG: 1 CAPSULE ORAL at 05:11

## 2018-11-04 RX ADMIN — FLUCONAZOLE 200 MG: 200 TABLET ORAL at 08:11

## 2018-11-04 RX ADMIN — INSULIN ASPART 10 UNITS: 100 INJECTION, SOLUTION INTRAVENOUS; SUBCUTANEOUS at 05:11

## 2018-11-04 RX ADMIN — ALBUMIN HUMAN 25 G: 0.25 SOLUTION INTRAVENOUS at 09:11

## 2018-11-04 RX ADMIN — INSULIN ASPART 10 UNITS: 100 INJECTION, SOLUTION INTRAVENOUS; SUBCUTANEOUS at 07:11

## 2018-11-04 RX ADMIN — POLYETHYLENE GLYCOL 3350 17 G: 17 POWDER, FOR SOLUTION ORAL at 08:11

## 2018-11-04 RX ADMIN — INSULIN ASPART 3 UNITS: 100 INJECTION, SOLUTION INTRAVENOUS; SUBCUTANEOUS at 05:11

## 2018-11-04 RX ADMIN — INSULIN ASPART 10 UNITS: 100 INJECTION, SOLUTION INTRAVENOUS; SUBCUTANEOUS at 12:11

## 2018-11-04 RX ADMIN — ALBUMIN HUMAN 25 G: 0.25 SOLUTION INTRAVENOUS at 05:11

## 2018-11-04 RX ADMIN — TACROLIMUS 1 MG: 1 CAPSULE ORAL at 07:11

## 2018-11-04 NOTE — PROGRESS NOTES
[facility]  Liver Transplant  Progress Note    Patient Name: Scarlett Reddy  MRN: [id number]  Admission Date: 10/1/2018  Hospital Length of Stay: 33 days  Code Status: Full Code  Primary Care Provider: Primary Doctor No  Post-Operative Day: 30    ORGAN:   LIVER  Disease Etiology: Cirrhosis: Type B and D  Donor Type:    - Brain Death  CDC High Risk:   No  Donor CMV Status:   Donor CMV Status: Positive  Donor HBcAB:   Negative  Donor HCV Status:   Negative  Donor HBV SETH: Negative  Donor HCV SETH: Negative  Whole or Partial: Whole Liver  Biliary Anastomosis: End to End  Arterial Anatomy: Replaced Left Hepatic and Right Hepatic  Subjective:     History of Present Illness:  Ms. Reddy is a 67 y/o female with PMH of ESLD secondary Hep B and D.  Listed for liver transplant with MELD 23.  Paracentesis 10/1 with 5L removed, no fluid sent for analysis.  Morning labs significant for hyponatremia, Na 119.  Pt admitted to LTS for sodium monitoring.        Hospital Course:  Hospital Course: 67 y/o F h/o HBV/delta cirrhosis s/p DBDLT 10/5/18 (CMV D+/R+, steroid induction, MMF/tacro/pred maintenance) with take back on 10/6 2/2 hyperbilirubinema and bilious VALORIE drainage, no leak identified. Post-op course c/b hypercapneic and hypoxic respiratory failure and was reintubated though quickly extubated now doing well. Liver bx (10/6) sig for Zone 3 hemorrhage and collapse, in addition to cholestasis. Transferred from ICU on POD #4. ID consulted to comment on positive diphtheroid culture from ascitic fluid (likely skin colonization) as well as ESBL Klebsiella pneumoniae in urine culture (10/4).  Pt asymptomatic and cell count from ascitic fluid unremarkable. Per ID recs, no need to treat diphtheroids or asymptomatic ESBL Kleb pneumo bacteriuria though pt has had 6 days of therapy which would cover these organisms. Mild peritransplant ascites- repeat US 10/6 with increased arterial resistive indices. Repeat US 10/9  w/ continued elevation in RIs and fluid collections. LFTs trending down nicely.     Acute gradual worsening of renal function s/p OLTx.Creatinine on arrival 0.8 and has been up trending. Nephrology consulted for post-op EVA. Cr cont to trend up but remains to have good UOP. Had stable response to lasiz diuresis.  BUN elevated requiring multiple rounds of dialysis (10/15, 10/17, 10/20, 10/31). No significant improvements in kidney function noted.   In addition, pt H/H cont to fluctuate requiring multiple blood transfusions (10/14, 10/16, 10/18, 10/19, 10/22, 10/27, 10/29).  Pt reported dark stools 10/15 but color has normalized. FOBT+. EGD 10/18 consistent with ulcerated mucosa in duodenum s/p coagulation. Concern for H. Pylori. Started Amoxicillin/levofloxacin (10/19, complete 11/2)). Remains on Protonix 40 mg BID. Biopsy negative for infection and CMV. Will cont close monitoring H/H, transfuse as needed with goal Hb > 7.0.  Of note, Urine cx + Klebsiella pneumoniae (10/13). Received 3 day course of ciprofloxacin, dc'd 10/19.   On 10/19 pm pt c/o crushing chest pain. Troponin 0.094, likely 2/2 ischemic stress. EKG NSR.     Interval history: no acute events overnight. Pt reports feeling well this am. One unit of PRBCs given 11/3 with good response. Cr level noted with only minimal increases. Lasix placed on hold per nephrology recs. Will plan to hydrate today w/ albumin. Re-consulted GI for f/u EGD given appearance of previous endoscopy. Pt remains on protonix 40 BID. Denies melena. Cont to hold heparin. WBC cont to decrease. Immunosuppression managed with hydrocortisone 50 (cont to hold Cellcept, bactrim, valcyte). Prograf now restarted and transitioned IV steroids to PO prednisone on 11/3. CMV PCR noted with 129. Cont to monitor.     Scheduled Meds:   albumin human 25%  25 g Intravenous Q8H    docusate sodium  100 mg Oral Daily    entecavir  0.5 mg Oral Q72H    fluconazole  200 mg Oral Daily    insulin aspart  U-100  10-12 Units Subcutaneous TIDWM    magnesium oxide  800 mg Oral BID    midodrine  15 mg Oral TID    mirtazapine  7.5 mg Oral QHS    pantoprazole  40 mg Intravenous BID    polyethylene glycol  17 g Oral Daily    predniSONE  20 mg Oral Daily    tacrolimus  1 mg Oral BID    ursodiol  300 mg Oral BID     Continuous Infusions:   sodium chloride 0.9% 1,000 mL (10/31/18 2123)     PRN Meds:sodium chloride, bisacodyl, bisacodyl, calcium carbonate, dextrose 50%, dextrose 50%, glucagon (human recombinant), glucose, glucose, insulin aspart U-100, ondansetron, ondansetron, oxyCODONE, oxyCODONE, polyethylene glycol, promethazine (PHENERGAN) IVPB, sodium chloride 0.9%    Review of Systems   Constitutional: Positive for activity change, appetite change (improving) and fatigue. Negative for fever.   Respiratory: Negative for cough and shortness of breath.    Cardiovascular: Positive for leg swelling. Negative for chest pain.   Gastrointestinal: Positive for abdominal distention and abdominal pain. Negative for blood in stool, constipation, diarrhea, nausea and vomiting.   Genitourinary: Negative for decreased urine volume, difficulty urinating and dysuria.   Musculoskeletal: Negative for arthralgias and back pain.   Skin: Positive for wound.   Allergic/Immunologic: Positive for immunocompromised state.   Neurological: Positive for weakness. Negative for tremors and headaches.   Psychiatric/Behavioral: Negative for confusion and sleep disturbance.     Objective:     Vital Signs (Most Recent):  Temp: 97.6 °F (36.4 °C) (11/04/18 0722)  Pulse: 91 (11/04/18 0722)  Resp: 17 (11/04/18 0722)  BP: 124/77 (11/04/18 0722)  SpO2: 98 % (11/04/18 0722) Vital Signs (24h Range):  Temp:  [97.5 °F (36.4 °C)-98.1 °F (36.7 °C)] 97.6 °F (36.4 °C)  Pulse:  [] 91  Resp:  [9-21] 17  SpO2:  [96 %-100 %] 98 %  BP: (107-135)/(67-84) 124/77     Weight: 73.8 kg (162 lb 11.2 oz)  Body mass index is 28.82 kg/m².    Intake/Output - Last 3  Shifts       11/02 0700 - 11/03 0659 11/03 0700 - 11/04 0659 11/04 0700 - 11/05 0659    P.O. 1170 960     I.V. (mL/kg)       Blood   487.9    Total Intake(mL/kg) 1170 (15.8) 960 (13) 487.9 (6.6)    Urine (mL/kg/hr) 1070 (0.6) 600 (0.3)     Other       Stool 0 0     Total Output 1070 600     Net +100 +360 +487.9           Urine Occurrence  2 x     Stool Occurrence 3 x 1 x           Physical Exam   Constitutional: She is oriented to person, place, and time.   Temporal and distal extremity muscle wasting   Eyes: EOM are normal. Pupils are equal, round, and reactive to light. Scleral icterus is present.   Neck: Normal range of motion.   Cardiovascular: Normal rate and regular rhythm.   No murmur heard.  Pulmonary/Chest: Effort normal and breath sounds normal. No respiratory distress. She has no wheezes.   Abdominal: Soft. Bowel sounds are normal. She exhibits distension. There is tenderness. There is no guarding.   Chevron incision w/ staples, CDI  Leakage from previous RLQ VALORIE site   Musculoskeletal: Normal range of motion. She exhibits edema (3+ BLE).   Neurological: She is alert and oriented to person, place, and time.   Skin: Skin is warm and dry.   Nursing note and vitals reviewed.      Laboratory:  Immunosuppressants         Stop Route Frequency     tacrolimus capsule 1 mg      -- Oral 2 times daily        CBC:   Recent Labs   Lab 11/04/18  0530   WBC 1.92*   RBC 3.00*   HGB 8.7*   HCT 27.3*   PLT 64*   MCV 91   MCH 29.0   MCHC 31.9*     CMP:   Recent Labs   Lab 11/04/18  0530   *   CALCIUM 8.4*   ALBUMIN 2.4*   PROT 4.2*   *   K 3.7   CO2 23   CL 99   BUN 76*   CREATININE 1.9*   ALKPHOS 113   ALT 14   AST 16   BILITOT 2.0*     Labs within the past 24 hours have been reviewed.    Diagnostic Results:  I have personally reviewed all pertinent imaging studies.    Assessment/Plan:     * Liver transplanted    - pmhx of  ESLD secondary to hep B cirrhosis, now s/p liver transplant on 10/5, with takeback for  hyperbilirubinemia and bilious VALORIE output 10/6; no biliary leak identified.   - POD 1 US: increased arterial resistive indices, suggestive of edema vs cute rejection vs congestion.  - Repeat US 10/9 with continued elevation of RIs.  - LFTs stable.  - T bili trending down.  - repeat liver US (10/11): elevated RIs.  -Now w/ Post LTx w/ EVA and possible slow oozing GIB.            Leukopenia    -Stopped Cellcept, Bactrim, and Valcyte 10/31--> will resume once WBC improves and stable.   -CMV PCR noted to be positive at 129. Will hold off on treatment with valcyte at this time.   -Monitor.        Azotemia    CRRT 10/31 for uremic toxin clearance  BUN trending back up. Monitor closely.   Monitor        Duodenal ulcer    - EGD 10/18- sig for ulcerated duodenal mucosa.   - Biopsy 10/18- no infection detected.  - Received 14 day course of amoxicillin/levo (completed 11/2) for potential H. Pylori.   - Cont protonix 40 BID.  - Plan for repeat EGD on Monday per GI.          UTI (urinary tract infection)    - Urine Cx 10/15 w/ Klebsiella pneumonia.  - Started Ciprofloxacin (10/17-10/19).   - Started on amoxicillin/levo for H. Pylori (completed abx 11/2)  - Pt asymptomatic at this time.       Acute blood loss anemia    - H&H cont to fluctuate.   - FOBT +  - EGD 10/18- showed diffuse bleeding with ulcerated duodenal mucosa   - Monitoring w/ CBC q 12 to assess the need for transfusion.  - 1 unit PRBCs transfused 10/27 and 11/3.   - H&H with good response. Monitor.             Post LT - Acute renal failure    - Creatinine on arrival 0.8 and trended up.  - Nephrology consulted.  - Renal US (10/11): no hydronephrosis.  - Echo 10/12: diastolic dysfunction, likely 2/2 to ARF.  - CRRT 10/14; SLED 10/17, 10/20. --> no improvements noted in kidney fxn.  -24 hr urine creatine collection :  CrCl of 7.   -CRRT 10/31 with 1L removed.   -Lasix 40 BID (restarted 11/1)  - Lasix now on hold as of 11/3 per nephrology recs.      Hypervolemia  associated with renal insufficiency    - CRRT 10/14, 10/17, 10/20, 10/31  -CXR 10/30: abundant fluid in b/l pleural space  -Restarted Lasix 40 BID (11/2)  - Lasix now hold per nephrology recs.        EVA (acute kidney injury)    - Albumin for hydration today.        Metabolic acidosis    - CRRT 10/14.  - SLED 10/17, 10/20, 10/31         At risk for opportunistic infections    - Valcyte for CMV prophylaxis--> holding (10/31) for leukopenia. Pt will need weekly CMV PCR while discontinued.  - Bactrim for PCP prophylaxis ([facility]).--> holding (10/31) for leukopenia  - Fluconazole for fungal prophylaxis.         Long-term use of immunosuppressant medication    -  Prograf: stopped 10/29. Resumed 11/2.  --> Will monitor for signs of toxic side effects, check daily troughs, and change meds accordingly.  -Prednisone: stopped 10/29. Started on Hydrocortisone  - Cellcept: stopped 10/31 for leukopenia. Will resume after improvements in WBC         Prophylactic immunotherapy    - See long term use immunosuppresion.       Hypokalemia           Ascites    - paracentesis 10/1, 5L removed, no fluid sent for analysis.  -pt remains volume overloaded.        Weakness    - see physical decond.       Physical deconditioning    - PT/OT consulted.  - Recommend home health PT and rolling walker at discharge.       Hyponatremia    - Na 119 on admit.  - Na was improving post transplant w HD.  - Na stable at this time.              Hypotension    - continue Midodrine 15 mg TID. Monitor.       Anemia requiring transfusions    - Fluctuating H/H.    - Transfusions: 10/14; 10/16; 10/18; 10/19; 10/22; 10/27; 10/29, 10/31  - FOBT +  - LDH elevated, Hapto < 10.  - EGD 10/18: ulcerated duodenal mucosa.   - Monitoring w/ CBC q12hr to assess the need for transfusion.  - H/H stable. No transfusion needed at this time. Cont to monitor  -Consulted GI about this ongoing issue and further need of another EGD         Severe malnutrition    - dietary consulted.    - temporal and distal extremity muscle wasting and edema.  - encourage supplements and to increase nutritional intake .  - per nephrology, pt to be on low protein diet.   - Prealbumin reviewed.      Type 2 diabetes mellitus with diabetic polyneuropathy, with long-term current use of insulin    - continue home regimen.  - endocrine consulted.  - Pt off insulin drip at this time.  Apprec endo recs.        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             VTE Risk Mitigation (From admission, onward)        Ordered     Place sequential compression device  Until discontinued      10/05/18 0709          The patients clinical status was discussed at multidisplinary rounds, involving transplant surgery, transplant medicine, pharmacy, nursing, nutrition, and social work    Discharge Planning:  Not a candidate for d/c at this time.     Fred Cruz NP  Liver Transplant  [facility]

## 2018-11-04 NOTE — ASSESSMENT & PLAN NOTE
- Valcyte for CMV prophylaxis--> holding (10/31) for leukopenia. Pt will need weekly CMV PCR while discontinued.  - Bactrim for PCP prophylaxis ([facility]).--> holding (10/31) for leukopenia  - Fluconazole for fungal prophylaxis.

## 2018-11-04 NOTE — SUBJECTIVE & OBJECTIVE
Scheduled Meds:   albumin human 25%  25 g Intravenous Q8H    docusate sodium  100 mg Oral Daily    entecavir  0.5 mg Oral Q72H    fluconazole  200 mg Oral Daily    insulin aspart U-100  10-12 Units Subcutaneous TIDWM    magnesium oxide  800 mg Oral BID    midodrine  15 mg Oral TID    mirtazapine  7.5 mg Oral QHS    pantoprazole  40 mg Intravenous BID    polyethylene glycol  17 g Oral Daily    predniSONE  20 mg Oral Daily    tacrolimus  1 mg Oral BID    ursodiol  300 mg Oral BID     Continuous Infusions:   sodium chloride 0.9% 1,000 mL (10/31/18 2123)     PRN Meds:sodium chloride, bisacodyl, bisacodyl, calcium carbonate, dextrose 50%, dextrose 50%, glucagon (human recombinant), glucose, glucose, insulin aspart U-100, ondansetron, ondansetron, oxyCODONE, oxyCODONE, polyethylene glycol, promethazine (PHENERGAN) IVPB, sodium chloride 0.9%    Review of Systems   Constitutional: Positive for activity change, appetite change (improving) and fatigue. Negative for fever.   Respiratory: Negative for cough and shortness of breath.    Cardiovascular: Positive for leg swelling. Negative for chest pain.   Gastrointestinal: Positive for abdominal distention and abdominal pain. Negative for blood in stool, constipation, diarrhea, nausea and vomiting.   Genitourinary: Negative for decreased urine volume, difficulty urinating and dysuria.   Musculoskeletal: Negative for arthralgias and back pain.   Skin: Positive for wound.   Allergic/Immunologic: Positive for immunocompromised state.   Neurological: Positive for weakness. Negative for tremors and headaches.   Psychiatric/Behavioral: Negative for confusion and sleep disturbance.     Objective:     Vital Signs (Most Recent):  Temp: 97.6 °F (36.4 °C) (11/04/18 0722)  Pulse: 91 (11/04/18 0722)  Resp: 17 (11/04/18 0722)  BP: 124/77 (11/04/18 0722)  SpO2: 98 % (11/04/18 0722) Vital Signs (24h Range):  Temp:  [97.5 °F (36.4 °C)-98.1 °F (36.7 °C)] 97.6 °F (36.4 °C)  Pulse:   [] 91  Resp:  [9-21] 17  SpO2:  [96 %-100 %] 98 %  BP: (107-135)/(67-84) 124/77     Weight: 73.8 kg (162 lb 11.2 oz)  Body mass index is 28.82 kg/m².    Intake/Output - Last 3 Shifts       11/02 0700 - 11/03 0659 11/03 0700 - 11/04 0659 11/04 0700 - 11/05 0659    P.O. 1170 960     I.V. (mL/kg)       Blood   487.9    Total Intake(mL/kg) 1170 (15.8) 960 (13) 487.9 (6.6)    Urine (mL/kg/hr) 1070 (0.6) 600 (0.3)     Other       Stool 0 0     Total Output 1070 600     Net +100 +360 +487.9           Urine Occurrence  2 x     Stool Occurrence 3 x 1 x           Physical Exam   Constitutional: She is oriented to person, place, and time.   Temporal and distal extremity muscle wasting   Eyes: EOM are normal. Pupils are equal, round, and reactive to light. Scleral icterus is present.   Neck: Normal range of motion.   Cardiovascular: Normal rate and regular rhythm.   No murmur heard.  Pulmonary/Chest: Effort normal and breath sounds normal. No respiratory distress. She has no wheezes.   Abdominal: Soft. Bowel sounds are normal. She exhibits distension. There is tenderness. There is no guarding.   Chevron incision w/ staples, CDI  Leakage from previous RLQ VALORIE site   Musculoskeletal: Normal range of motion. She exhibits edema (3+ BLE).   Neurological: She is alert and oriented to person, place, and time.   Skin: Skin is warm and dry.   Nursing note and vitals reviewed.      Laboratory:  Immunosuppressants         Stop Route Frequency     tacrolimus capsule 1 mg      -- Oral 2 times daily        CBC:   Recent Labs   Lab 11/04/18  0530   WBC 1.92*   RBC 3.00*   HGB 8.7*   HCT 27.3*   PLT 64*   MCV 91   MCH 29.0   MCHC 31.9*     CMP:   Recent Labs   Lab 11/04/18  0530   *   CALCIUM 8.4*   ALBUMIN 2.4*   PROT 4.2*   *   K 3.7   CO2 23   CL 99   BUN 76*   CREATININE 1.9*   ALKPHOS 113   ALT 14   AST 16   BILITOT 2.0*     Labs within the past 24 hours have been reviewed.    Diagnostic Results:  I have personally  reviewed all pertinent imaging studies.

## 2018-11-04 NOTE — ASSESSMENT & PLAN NOTE
- H&H cont to fluctuate.   - FOBT +  - EGD 10/18- showed diffuse bleeding with ulcerated duodenal mucosa   - Monitoring w/ CBC q 12 to assess the need for transfusion.  - 1 unit PRBCs transfused 10/27 and 11/3.   - H&H with good response. Monitor.

## 2018-11-04 NOTE — ASSESSMENT & PLAN NOTE
- CRRT 10/14, 10/17, 10/20, 10/31  -CXR 10/30: abundant fluid in b/l pleural space  -Restarted Lasix 40 BID (11/2)  - Lasix now hold per nephrology recs.

## 2018-11-04 NOTE — SUBJECTIVE & OBJECTIVE
"Interval HPI:   Overnight events: Remains on TSU.  BG mildly elevated yesterday, trending up overnight.  Steroid switched from hydrocortisone to oral prednisone 20mg yesterday afternoon.  Eating:   {Blank single:04505::"100%","50%","<25%","NPO"}  Nausea: No  Hypoglycemia and intervention: No  Fever: No  TPN and/or TF: No    /77 (Patient Position: Sitting)   Pulse 91   Temp 97.6 °F (36.4 °C) (Oral)   Resp 17   Ht 5' 3" (1.6 m)   Wt 73.8 kg (162 lb 11.2 oz)   LMP  (LMP Unknown)   SpO2 98%   Breastfeeding? No   BMI 28.82 kg/m²     Labs Reviewed and Include    Recent Labs   Lab 11/04/18  0530   *   CALCIUM 8.4*   ALBUMIN 2.4*   PROT 4.2*   *   K 3.7   CO2 23   CL 99   BUN 76*   CREATININE 1.9*   ALKPHOS 113   ALT 14   AST 16   BILITOT 2.0*     Lab Results   Component Value Date    WBC 1.92 (LL) 11/04/2018    HGB 8.7 (L) 11/04/2018    HCT 27.3 (L) 11/04/2018    MCV 91 11/04/2018    PLT 64 (L) 11/04/2018     No results for input(s): TSH, FREET4 in the last 168 hours.  Lab Results   Component Value Date    HGBA1C 6.0 (H) 10/04/2018       Nutritional status:   Body mass index is 28.82 kg/m².  Lab Results   Component Value Date    ALBUMIN 2.4 (L) 11/04/2018    ALBUMIN 2.5 (L) 11/03/2018    ALBUMIN 2.5 (L) 11/02/2018     Lab Results   Component Value Date    PREALBUMIN 15 (L) 11/03/2018    PREALBUMIN 12 (L) 09/17/2018       Estimated Creatinine Clearance: 26.9 mL/min (A) (based on SCr of 1.9 mg/dL (H)).    Accu-Checks  Recent Labs     11/01/18  1806 11/01/18  2315 11/02/18  0757 11/02/18  1423 11/02/18  1916 11/03/18  0807 11/03/18  1224 11/03/18  1827 11/03/18  2240 11/04/18  0828   POCTGLUCOSE 258* 289* 258* 195* 187* 180* 210* 193* 240* 233*       Current Medications and/or Treatments Impacting Glycemic Control  Immunotherapy:    Immunosuppressants         Stop Route Frequency     tacrolimus capsule 1 mg      -- Oral 2 times daily        Steroids:   Hormones (From admission, onward)    Start     " Stop Route Frequency Ordered    11/03/18 1045  predniSONE tablet 20 mg      -- Oral Daily 11/03/18 0949    10/17/18 0945  predniSONE tablet 15 mg      10/24 0859 Oral Daily 10/17/18 0943        Pressors:    Autonomic Drugs (From admission, onward)    Start     Stop Route Frequency Ordered    10/10/18 1500  midodrine tablet 15 mg      -- Oral 3 times daily 10/10/18 1122    10/06/18 1617  rocuronium (ZEMURON) 10 mg/mL injection     Comments:  Created by cabinet override    10/07 0429   10/06/18 1617        Hyperglycemia/Diabetes Medications:   Antihyperglycemics (From admission, onward)    Start     Stop Route Frequency Ordered    11/02/18 0730  insulin aspart U-100 pen 10-12 Units      -- SubQ 3 times daily with meals 11/02/18 0707    10/14/18 0925  insulin aspart U-100 pen 0-5 Units      -- SubQ Before meals & nightly PRN 10/14/18 0825    10/07/18 1200  insulin regular (Humulin R) 100 Units in sodium chloride 0.9% 100 mL infusion      10/13 2100 IV Continuous 10/07/18 1054

## 2018-11-04 NOTE — TREATMENT PLAN
GI Treatment Plan    Scarlett Reddy is a 68 y.o. female admitted to hospital 10/1/2018 (Hospital Day: 35) due to Liver transplanted.     Interval History  Contacted by team as the patient is requiring multiple blood transfusions.  Patient denies abdominal pain, melena, hematemesis.    Objective  Temp:  [97.6 °F (36.4 °C)-98.1 °F (36.7 °C)] 97.7 °F (36.5 °C) (11/04 1533)  Pulse:  [] 88 (11/04 1616)  BP: (122-135)/(75-84) 123/76 (11/04 1230)  Resp:  [9-21] 20 (11/04 1616)  SpO2:  [96 %-100 %] 100 % (11/04 1616)    General: Alert, Oriented x3, no distress  Abdomen: Normoactive bowel sounds. Non-distended. Normal tympany. Soft. Non-tender. No peritoneal signs.    Laboratory    Recent Labs   Lab 11/02/18  0950 11/03/18  0530 11/04/18  0530   HGB 7.7* 6.9* 8.7*       Lab Results   Component Value Date    WBC 1.92 (LL) 11/04/2018    HGB 8.7 (L) 11/04/2018    HCT 27.3 (L) 11/04/2018    MCV 91 11/04/2018    PLT 64 (L) 11/04/2018       Lab Results   Component Value Date     (L) 11/04/2018    K 3.7 11/04/2018    CL 99 11/04/2018    CO2 23 11/04/2018    BUN 76 (H) 11/04/2018    CREATININE 1.9 (H) 11/04/2018    CALCIUM 8.4 (L) 11/04/2018    ANIONGAP 11 11/04/2018    ESTGFRAFRICA 30.6 (A) 11/04/2018    EGFRNONAA 26.5 (A) 11/04/2018       Lab Results   Component Value Date    ALT 14 11/04/2018    AST 16 11/04/2018     (H) 10/08/2018    ALKPHOS 113 11/04/2018    BILITOT 2.0 (H) 11/04/2018       Lab Results   Component Value Date    INR 1.1 11/04/2018    INR 1.0 11/03/2018    INR 1.1 11/02/2018       Plan  - Will plan on EGD on Monday to re-assess duodenal ulcer. Please keep NPO Sunday after MN.  - Continue to monitor H/H  - Plan of care was discussed with primary team.  - We will continue to follow.    Thank you for involving us in the care of Scarlett Reddy. Please call with any additional questions, concerns or changes in the patient's clinical status.

## 2018-11-04 NOTE — ASSESSMENT & PLAN NOTE
-Stopped Cellcept, Bactrim, and Valcyte 10/31--> will resume once WBC improves and stable.   -CMV PCR noted to be positive at 129. Will hold off on treatment with valcyte at this time.   -Monitor.

## 2018-11-04 NOTE — PLAN OF CARE
Problem: Patient Care Overview  Goal: Plan of Care Review  Outcome: Ongoing (interventions implemented as appropriate)  Pt is AAOx3, all interactions completed with the assistance of an Egyptian . Pt's abd is distended, dressings to previous VALORIE sites changed twice this shift due to serous drainage. One time dose of Albumin administered, UO >400 ml so far this shift. Pt denies pain or nausea. Vital signs stable. Fall precautions in place, side rails upx3, non slip footwear applied, pt instructed to call for assistance before attempting to ambulate, pt verbalized understanding. Call bell within reach, family present at bedside. This RN instructed the pt on the importance of hand hygiene, pt verbalized understanding. Pt turns self in bed independently no signs of skin breakdown noted.

## 2018-11-04 NOTE — ASSESSMENT & PLAN NOTE
- Creatinine on arrival 0.8 and trended up.  - Nephrology consulted.  - Renal US (10/11): no hydronephrosis.  - Echo 10/12: diastolic dysfunction, likely 2/2 to ARF.  - CRRT 10/14; SLED 10/17, 10/20. --> no improvements noted in kidney fxn.  -24 hr urine creatine collection :  CrCl of 7.   -CRRT 10/31 with 1L removed.   -Lasix 40 BID (restarted 11/1)  - Lasix now on hold as of 11/3 per nephrology recs.

## 2018-11-05 ENCOUNTER — ANESTHESIA (OUTPATIENT)
Dept: ENDOSCOPY | Facility: HOSPITAL | Age: 68
DRG: 005 | End: 2018-11-05
Payer: COMMERCIAL

## 2018-11-05 ENCOUNTER — ANESTHESIA EVENT (OUTPATIENT)
Dept: ENDOSCOPY | Facility: HOSPITAL | Age: 68
DRG: 005 | End: 2018-11-05
Payer: COMMERCIAL

## 2018-11-05 PROBLEM — R23.9 ALTERATION IN SKIN INTEGRITY: Status: ACTIVE | Noted: 2018-11-05

## 2018-11-05 LAB
ABO + RH BLD: NORMAL
ALBUMIN SERPL BCP-MCNC: 3.7 G/DL
ALP SERPL-CCNC: 91 U/L
ALT SERPL W/O P-5'-P-CCNC: 12 U/L
ANION GAP SERPL CALC-SCNC: 14 MMOL/L
AST SERPL-CCNC: 14 U/L
BASOPHILS # BLD AUTO: 0.01 K/UL
BASOPHILS NFR BLD: 0.4 %
BILIRUB SERPL-MCNC: 1.8 MG/DL
BLD GP AB SCN CELLS X3 SERPL QL: NORMAL
BUN SERPL-MCNC: 80 MG/DL
CALCIUM SERPL-MCNC: 8.8 MG/DL
CHLORIDE SERPL-SCNC: 98 MMOL/L
CO2 SERPL-SCNC: 22 MMOL/L
CREAT SERPL-MCNC: 2.1 MG/DL
DIFFERENTIAL METHOD: ABNORMAL
EOSINOPHIL # BLD AUTO: 0 K/UL
EOSINOPHIL NFR BLD: 0.4 %
ERYTHROCYTE [DISTWIDTH] IN BLOOD BY AUTOMATED COUNT: 17.1 %
EST. GFR  (AFRICAN AMERICAN): 27.1 ML/MIN/1.73 M^2
EST. GFR  (NON AFRICAN AMERICAN): 23.5 ML/MIN/1.73 M^2
GLUCOSE SERPL-MCNC: 177 MG/DL
HCT VFR BLD AUTO: 26.2 %
HGB BLD-MCNC: 8.4 G/DL
IMM GRANULOCYTES # BLD AUTO: 0.1 K/UL
IMM GRANULOCYTES NFR BLD AUTO: 4 %
INR PPP: 1.4
LYMPHOCYTES # BLD AUTO: 0.2 K/UL
LYMPHOCYTES NFR BLD: 8.4 %
MAGNESIUM SERPL-MCNC: 2.3 MG/DL
MCH RBC QN AUTO: 29.4 PG
MCHC RBC AUTO-ENTMCNC: 32.1 G/DL
MCV RBC AUTO: 92 FL
MONOCYTES # BLD AUTO: 0.3 K/UL
MONOCYTES NFR BLD: 11.2 %
NEUTROPHILS # BLD AUTO: 1.9 K/UL
NEUTROPHILS NFR BLD: 75.6 %
NRBC BLD-RTO: 0 /100 WBC
PHOSPHATE SERPL-MCNC: 3.7 MG/DL
PLATELET # BLD AUTO: 90 K/UL
PMV BLD AUTO: 10.9 FL
POCT GLUCOSE: 172 MG/DL (ref 70–110)
POCT GLUCOSE: 194 MG/DL (ref 70–110)
POCT GLUCOSE: 202 MG/DL (ref 70–110)
POCT GLUCOSE: 208 MG/DL (ref 70–110)
POCT GLUCOSE: 247 MG/DL (ref 70–110)
POCT GLUCOSE: 262 MG/DL (ref 70–110)
POTASSIUM SERPL-SCNC: 3.7 MMOL/L
PROT SERPL-MCNC: 5.2 G/DL
PROTHROMBIN TIME: 14 SEC
RBC # BLD AUTO: 2.86 M/UL
SODIUM SERPL-SCNC: 134 MMOL/L
TACROLIMUS BLD-MCNC: 4.2 NG/ML
WBC # BLD AUTO: 2.5 K/UL

## 2018-11-05 PROCEDURE — 63600175 PHARM REV CODE 636 W HCPCS: Mod: JG | Performed by: NURSE PRACTITIONER

## 2018-11-05 PROCEDURE — 80053 COMPREHEN METABOLIC PANEL: CPT

## 2018-11-05 PROCEDURE — 43235 EGD DIAGNOSTIC BRUSH WASH: CPT | Performed by: INTERNAL MEDICINE

## 2018-11-05 PROCEDURE — 37000008 HC ANESTHESIA 1ST 15 MINUTES: Performed by: INTERNAL MEDICINE

## 2018-11-05 PROCEDURE — C9113 INJ PANTOPRAZOLE SODIUM, VIA: HCPCS | Performed by: PHYSICIAN ASSISTANT

## 2018-11-05 PROCEDURE — 25000003 PHARM REV CODE 250: Performed by: NURSE PRACTITIONER

## 2018-11-05 PROCEDURE — 80197 ASSAY OF TACROLIMUS: CPT

## 2018-11-05 PROCEDURE — 0DJ08ZZ INSPECTION OF UPPER INTESTINAL TRACT, VIA NATURAL OR ARTIFICIAL OPENING ENDOSCOPIC: ICD-10-PCS | Performed by: INTERNAL MEDICINE

## 2018-11-05 PROCEDURE — 25000003 PHARM REV CODE 250: Performed by: NURSE ANESTHETIST, CERTIFIED REGISTERED

## 2018-11-05 PROCEDURE — 86901 BLOOD TYPING SEROLOGIC RH(D): CPT

## 2018-11-05 PROCEDURE — 99232 SBSQ HOSP IP/OBS MODERATE 35: CPT | Mod: ,,, | Performed by: INTERNAL MEDICINE

## 2018-11-05 PROCEDURE — D9220A PRA ANESTHESIA: Mod: CRNA,,, | Performed by: NURSE ANESTHETIST, CERTIFIED REGISTERED

## 2018-11-05 PROCEDURE — 37000009 HC ANESTHESIA EA ADD 15 MINS: Performed by: INTERNAL MEDICINE

## 2018-11-05 PROCEDURE — 83735 ASSAY OF MAGNESIUM: CPT

## 2018-11-05 PROCEDURE — 63600175 PHARM REV CODE 636 W HCPCS: Performed by: NURSE PRACTITIONER

## 2018-11-05 PROCEDURE — P9047 ALBUMIN (HUMAN), 25%, 50ML: HCPCS | Mod: JG | Performed by: NURSE PRACTITIONER

## 2018-11-05 PROCEDURE — 82962 GLUCOSE BLOOD TEST: CPT | Performed by: INTERNAL MEDICINE

## 2018-11-05 PROCEDURE — D9220A PRA ANESTHESIA: Mod: ANES,,, | Performed by: ANESTHESIOLOGY

## 2018-11-05 PROCEDURE — 20600001 HC STEP DOWN PRIVATE ROOM

## 2018-11-05 PROCEDURE — 85025 COMPLETE CBC W/AUTO DIFF WBC: CPT

## 2018-11-05 PROCEDURE — 99900035 HC TECH TIME PER 15 MIN (STAT)

## 2018-11-05 PROCEDURE — 97110 THERAPEUTIC EXERCISES: CPT

## 2018-11-05 PROCEDURE — 43235 EGD DIAGNOSTIC BRUSH WASH: CPT | Mod: ,,, | Performed by: INTERNAL MEDICINE

## 2018-11-05 PROCEDURE — 63600175 PHARM REV CODE 636 W HCPCS: Performed by: NURSE ANESTHETIST, CERTIFIED REGISTERED

## 2018-11-05 PROCEDURE — 84100 ASSAY OF PHOSPHORUS: CPT

## 2018-11-05 PROCEDURE — 99233 SBSQ HOSP IP/OBS HIGH 50: CPT | Mod: 24,,, | Performed by: NURSE PRACTITIONER

## 2018-11-05 PROCEDURE — 85610 PROTHROMBIN TIME: CPT

## 2018-11-05 PROCEDURE — 25000003 PHARM REV CODE 250: Performed by: PHYSICIAN ASSISTANT

## 2018-11-05 PROCEDURE — 63600175 PHARM REV CODE 636 W HCPCS: Performed by: PHYSICIAN ASSISTANT

## 2018-11-05 PROCEDURE — 25000003 PHARM REV CODE 250: Performed by: TRANSPLANT SURGERY

## 2018-11-05 RX ORDER — ETOMIDATE 2 MG/ML
INJECTION INTRAVENOUS
Status: DISCONTINUED | OUTPATIENT
Start: 2018-11-05 | End: 2018-11-05

## 2018-11-05 RX ORDER — PREDNISONE 10 MG/1
10 TABLET ORAL DAILY
Status: COMPLETED | OUTPATIENT
Start: 2018-11-17 | End: 2018-11-23

## 2018-11-05 RX ORDER — MEPERIDINE HYDROCHLORIDE 50 MG/ML
12.5 INJECTION INTRAMUSCULAR; INTRAVENOUS; SUBCUTANEOUS ONCE AS NEEDED
Status: DISCONTINUED | OUTPATIENT
Start: 2018-11-05 | End: 2018-11-05 | Stop reason: HOSPADM

## 2018-11-05 RX ORDER — DIPHENHYDRAMINE HYDROCHLORIDE 50 MG/ML
25 INJECTION INTRAMUSCULAR; INTRAVENOUS EVERY 6 HOURS PRN
Status: DISCONTINUED | OUTPATIENT
Start: 2018-11-05 | End: 2018-11-05 | Stop reason: HOSPADM

## 2018-11-05 RX ORDER — PREDNISONE 10 MG/1
20 TABLET ORAL DAILY
Status: COMPLETED | OUTPATIENT
Start: 2018-11-06 | End: 2018-11-09

## 2018-11-05 RX ORDER — PREDNISONE 2.5 MG/1
2.5 TABLET ORAL DAILY
Status: COMPLETED | OUTPATIENT
Start: 2018-12-01 | End: 2018-12-07

## 2018-11-05 RX ORDER — SODIUM CHLORIDE 9 MG/ML
INJECTION, SOLUTION INTRAVENOUS CONTINUOUS PRN
Status: DISCONTINUED | OUTPATIENT
Start: 2018-11-05 | End: 2018-11-05

## 2018-11-05 RX ORDER — PHENYLEPHRINE HYDROCHLORIDE 10 MG/ML
INJECTION INTRAVENOUS
Status: DISCONTINUED | OUTPATIENT
Start: 2018-11-05 | End: 2018-11-05

## 2018-11-05 RX ORDER — FENTANYL CITRATE 50 UG/ML
25 INJECTION, SOLUTION INTRAMUSCULAR; INTRAVENOUS EVERY 5 MIN PRN
Status: DISCONTINUED | OUTPATIENT
Start: 2018-11-05 | End: 2018-11-05 | Stop reason: HOSPADM

## 2018-11-05 RX ORDER — HYDROMORPHONE HYDROCHLORIDE 1 MG/ML
0.2 INJECTION, SOLUTION INTRAMUSCULAR; INTRAVENOUS; SUBCUTANEOUS EVERY 5 MIN PRN
Status: DISCONTINUED | OUTPATIENT
Start: 2018-11-05 | End: 2018-11-05 | Stop reason: HOSPADM

## 2018-11-05 RX ORDER — PROPOFOL 10 MG/ML
VIAL (ML) INTRAVENOUS
Status: DISCONTINUED | OUTPATIENT
Start: 2018-11-05 | End: 2018-11-05

## 2018-11-05 RX ORDER — PREDNISONE 5 MG/1
5 TABLET ORAL DAILY
Status: COMPLETED | OUTPATIENT
Start: 2018-11-24 | End: 2018-11-30

## 2018-11-05 RX ORDER — INSULIN ASPART 100 [IU]/ML
11-13 INJECTION, SOLUTION INTRAVENOUS; SUBCUTANEOUS
Status: DISCONTINUED | OUTPATIENT
Start: 2018-11-05 | End: 2018-11-07

## 2018-11-05 RX ORDER — PROPOFOL 10 MG/ML
VIAL (ML) INTRAVENOUS CONTINUOUS PRN
Status: DISCONTINUED | OUTPATIENT
Start: 2018-11-05 | End: 2018-11-05

## 2018-11-05 RX ORDER — LIDOCAINE HCL/PF 100 MG/5ML
SYRINGE (ML) INTRAVENOUS
Status: DISCONTINUED | OUTPATIENT
Start: 2018-11-05 | End: 2018-11-05

## 2018-11-05 RX ORDER — ONDANSETRON 2 MG/ML
4 INJECTION INTRAMUSCULAR; INTRAVENOUS ONCE AS NEEDED
Status: DISCONTINUED | OUTPATIENT
Start: 2018-11-05 | End: 2018-11-05 | Stop reason: HOSPADM

## 2018-11-05 RX ORDER — ATOVAQUONE 750 MG/5ML
1500 SUSPENSION ORAL DAILY
Status: DISCONTINUED | OUTPATIENT
Start: 2018-11-06 | End: 2018-12-21 | Stop reason: HOSPADM

## 2018-11-05 RX ADMIN — DOCUSATE SODIUM 100 MG: 100 CAPSULE, LIQUID FILLED ORAL at 04:11

## 2018-11-05 RX ADMIN — MAGNESIUM OXIDE TAB 400 MG (241.3 MG ELEMENTAL MG) 800 MG: 400 (241.3 MG) TAB at 08:11

## 2018-11-05 RX ADMIN — MIRTAZAPINE 7.5 MG: 7.5 TABLET ORAL at 08:11

## 2018-11-05 RX ADMIN — SODIUM CHLORIDE: 0.9 INJECTION, SOLUTION INTRAVENOUS at 11:11

## 2018-11-05 RX ADMIN — INSULIN ASPART 11 UNITS: 100 INJECTION, SOLUTION INTRAVENOUS; SUBCUTANEOUS at 05:11

## 2018-11-05 RX ADMIN — MIDODRINE HYDROCHLORIDE 15 MG: 5 TABLET ORAL at 04:11

## 2018-11-05 RX ADMIN — ENTECAVIR 0.5 MG: 0.5 TABLET ORAL at 08:11

## 2018-11-05 RX ADMIN — PROPOFOL 100 MCG/KG/MIN: 10 INJECTION, EMULSION INTRAVENOUS at 11:11

## 2018-11-05 RX ADMIN — FLUCONAZOLE 200 MG: 200 TABLET ORAL at 08:11

## 2018-11-05 RX ADMIN — LIDOCAINE HYDROCHLORIDE 100 MG: 20 INJECTION, SOLUTION INTRAVENOUS at 11:11

## 2018-11-05 RX ADMIN — URSODIOL 300 MG: 300 CAPSULE ORAL at 08:11

## 2018-11-05 RX ADMIN — INSULIN ASPART 1 UNITS: 100 INJECTION, SOLUTION INTRAVENOUS; SUBCUTANEOUS at 08:11

## 2018-11-05 RX ADMIN — ETOMIDATE 2 MG: 2 INJECTION, SOLUTION INTRAVENOUS at 11:11

## 2018-11-05 RX ADMIN — ALBUMIN HUMAN 25 G: 0.25 SOLUTION INTRAVENOUS at 02:11

## 2018-11-05 RX ADMIN — PREDNISONE 20 MG: 10 TABLET ORAL at 08:11

## 2018-11-05 RX ADMIN — PROPOFOL 10 MG: 10 INJECTION, EMULSION INTRAVENOUS at 11:11

## 2018-11-05 RX ADMIN — MIDODRINE HYDROCHLORIDE 15 MG: 5 TABLET ORAL at 08:11

## 2018-11-05 RX ADMIN — PHENYLEPHRINE HYDROCHLORIDE 100 MCG: 10 INJECTION INTRAVENOUS at 11:11

## 2018-11-05 RX ADMIN — PANTOPRAZOLE SODIUM 40 MG: 40 INJECTION, POWDER, FOR SOLUTION INTRAVENOUS at 08:11

## 2018-11-05 RX ADMIN — TACROLIMUS 1 MG: 1 CAPSULE ORAL at 05:11

## 2018-11-05 RX ADMIN — INSULIN ASPART 3 UNITS: 100 INJECTION, SOLUTION INTRAVENOUS; SUBCUTANEOUS at 05:11

## 2018-11-05 RX ADMIN — TACROLIMUS 1 MG: 1 CAPSULE ORAL at 08:11

## 2018-11-05 NOTE — ASSESSMENT & PLAN NOTE
- Cr stable at 2.4. BUN increased from 81 to 92.  - Nephrology involved. Appreciate recs.  - Monitor.

## 2018-11-05 NOTE — SUBJECTIVE & OBJECTIVE
"Interval HPI:   Overnight events: Remains in TSU. BG elevated yesterday; required correction scale insulin in addition to scheduled novolog.  Prednisone 20 mg daily, standard steroid taper.  reatinine remains elevated, 2 today.  Eatin-75% - eating mostly foods from outside, size of meal varies  Nausea: No  Hypoglycemia and intervention: No  Fever: No  TPN and/or TF: No    /87   Pulse 99   Temp 97.8 °F (36.6 °C) (Oral)   Resp 14   Ht 5' 3" (1.6 m)   Wt 75 kg (165 lb 5.5 oz)   LMP  (LMP Unknown)   SpO2 96%   Breastfeeding? No   BMI 29.29 kg/m²     Labs Reviewed and Include    Recent Labs   Lab 18  0600   *   CALCIUM 8.6*   ALBUMIN 3.0*   PROT 4.5*   *   K 3.8   CO2 22*      BUN 83*   CREATININE 2.0*   ALKPHOS 95   ALT 15   AST 21   BILITOT 1.8*     Lab Results   Component Value Date    WBC 3.73 (L) 2018    HGB 9.5 (L) 2018    HCT 29.6 (L) 2018    MCV 91 2018    PLT 98 (L) 2018     No results for input(s): TSH, FREET4 in the last 168 hours.  Lab Results   Component Value Date    HGBA1C 6.0 (H) 10/04/2018       Nutritional status:   Body mass index is 29.29 kg/m².  Lab Results   Component Value Date    ALBUMIN 3.0 (L) 2018    ALBUMIN 3.7 2018    ALBUMIN 2.4 (L) 2018     Lab Results   Component Value Date    PREALBUMIN 15 (L) 2018    PREALBUMIN 12 (L) 2018       Estimated Creatinine Clearance: 25.7 mL/min (A) (based on SCr of 2 mg/dL (H)).    Accu-Checks  Recent Labs     18  1726 18  1814 18  2144 18  0746 18  1111 18  1202 18  1314 18  1625 18  2037 18  0802   POCTGLUCOSE 289* 293* 272* 172* 202* 194* 208* 262* 247* 178*       Current Medications and/or Treatments Impacting Glycemic Control  Immunotherapy:    Immunosuppressants         Stop Route Frequency     mycophenolate capsule 500 mg      -- Oral 2 times daily     tacrolimus capsule 1 mg      -- Oral " 2 times daily        Steroids:   Hormones (From admission, onward)    Start     Stop Route Frequency Ordered    12/01/18 0900  predniSONE tablet 2.5 mg      12/08 0859 Oral Daily 11/05/18 1004    11/24/18 0900  predniSONE tablet 5 mg      12/01 0859 Oral Daily 11/05/18 1004    11/17/18 0900  predniSONE tablet 10 mg      11/24 0859 Oral Daily 11/05/18 1004    11/10/18 0900  predniSONE tablet 15 mg      11/17 0859 Oral Daily 11/05/18 1004    11/06/18 0900  predniSONE tablet 20 mg      11/10 0859 Oral Daily 11/05/18 1004    10/17/18 0945  predniSONE tablet 15 mg      10/24 0859 Oral Daily 10/17/18 0943        Pressors:    Autonomic Drugs (From admission, onward)    Start     Stop Route Frequency Ordered    10/10/18 1500  midodrine tablet 15 mg      -- Oral 3 times daily 10/10/18 1122    10/06/18 1617  rocuronium (ZEMURON) 10 mg/mL injection     Comments:  Created by cabinet override    10/07 0429   10/06/18 1617        Hyperglycemia/Diabetes Medications:   Antihyperglycemics (From admission, onward)    Start     Stop Route Frequency Ordered    11/05/18 0745  insulin aspart U-100 pen 11-13 Units      -- SubQ 3 times daily with meals 11/05/18 0738    10/14/18 0925  insulin aspart U-100 pen 0-5 Units      -- SubQ Before meals & nightly PRN 10/14/18 0825    10/07/18 1200  insulin regular (Humulin R) 100 Units in sodium chloride 0.9% 100 mL infusion      10/13 2100 IV Continuous 10/07/18 1055

## 2018-11-05 NOTE — ASSESSMENT & PLAN NOTE
-  Prograf: stopped 10/29. Resumed 11/2.  --> Will monitor for signs of toxic side effects, check daily troughs, and change meds accordingly.  - Prednisone: stopped 10/29. Started on Hydrocortisone. pred taper resumed.  - Cellcept: stopped 10/31 for leukopenia. Restarted Cellcept 500 mg bid, 11/6.

## 2018-11-05 NOTE — PROGRESS NOTES
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 Post-op course c/b hypercapneic and hypoxic respiratory failure and was reintubated though quickly extubated now doing well. Liver bx (10/6) sig for Zone 3 hemorrhage and collapse, in addition to cholestasis. Transferred from ICU on POD #4. ID consulted to comment on positive diphtheroid culture from ascitic fluid (likely skin colonization) as well as ESBL Klebsiella pneumoniae in urine culture (10/4).  Pt asymptomatic and cell count from ascitic fluid unremarkable. Per ID recs, no need to treat diphtheroids or asymptomatic ESBL Kleb pneumo bacteriuria though pt has had 6 days of therapy which would cover these organisms. Mild peritransplant ascites- repeat US 10/6 with increased arterial resistive indices. Repeat US 10/9 w/ continued elevation in RIs and fluid collections. LFTs trending down nicely.      Acute gradual worsening of renal function s/p OLTx.Creatinine on arrival 0.8 and has been up trending. Nephrology consulted for post-op EVA. Cr cont to trend up but remains to have good UOP. Had stable response to lasiz diuresis.  BUN elevated requiring multiple rounds of dialysis (10/15, 10/17, 10/20, 10/31). No significant improvements in kidney function noted.   In addition, pt H/H cont to fluctuate requiring multiple blood transfusions (10/14, 10/16, 10/18, 10/19, 10/22, 10/27, 10/29).  Pt reported dark stools 10/15 but color has normalized. FOBT+. EGD 10/18 consistent with ulcerated mucosa in duodenum s/p  coagulation. Concern for H. Pylori. Started Amoxicillin/levofloxacin (10/19, complete 11/2)). Remains on Protonix 40 mg BID. Biopsy negative for infection and CMV. Will cont close monitoring H/H, transfuse as needed with goal Hb > 7.0.  Of note, Urine cx + Klebsiella pneumoniae (10/13). Received 3 day course of ciprofloxacin, dc'd 10/19.   On 10/19 pm pt c/o crushing chest pain. Troponin 0.094, likely 2/2 ischemic stress. EKG NSR.      11/5/18 - no acute events overnight. Pt reports feeling well this am. One unit of PRBCs given 11/3 with good response. Cr level noted with only minimal increases. Lasix placed on hold per nephrology recs. Will plan to hydrate today w/ albumin. EGD 11/5 unimpressive for overt bleed. Pt remains on protonix 40 BID. Denies melena. Cont to hold heparin. WBC cont to decrease. Immunosuppression managed with hydrocortisone 50 (cont to hold Cellcept, bactrim, valcyte). Prograf now restarted and transitioned IV steroids to PO prednisone on 11/3. CMV PCR noted with 129. Cont to monitor.     Past Medical History:   Diagnosis Date    Anemia requiring transfusions 8/16/2018    Ascites     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 8/15/2018    Cirrhosis     Cough 8/30/2018    Esophageal varices     Esophageal varices without bleeding 8/15/2018    Hepatic encephalopathy     Hepatitis B     Hepatitis D virus infection     Hypersplenism     Hyponatremia     Hypotension     Liver transplant candidate     Neutropenia     Portal hypertension     Severe malnutrition 8/16/2018    Type 2 diabetes mellitus, with long-term current use of insulin 8/15/2018       Past Surgical History:   Procedure Laterality Date    EGD (ESOPHAGOGASTRODUODENOSCOPY) N/A 10/18/2018    Performed by Chung Robins MD at [facility] (2ND FLR)    EGD (ESOPHAGOGASTRODUODENOSCOPY) N/A 8/17/2018    Performed by Travon Delgadillo MD at [facility] (2ND FLR)    ESOPHAGOGASTRODUODENOSCOPY N/A 8/17/2018    Procedure: EGD  (ESOPHAGOGASTRODUODENOSCOPY);  Surgeon: Travon Delgadillo MD;  Location: [facility] (2ND FLR);  Service: Endoscopy;  Laterality: N/A;    ESOPHAGOGASTRODUODENOSCOPY N/A 10/18/2018    Procedure: EGD (ESOPHAGOGASTRODUODENOSCOPY);  Surgeon: Chung Robins MD;  [facility]  Service: Endoscopy;  Laterality: N/A;    EXPLORATORY LAPAROTOMY AFTER LIVER TRANSPLANTATION N/A 10/6/2018    Procedure: LAPAROTOMY, EXPLORATORY, AFTER LIVER TRANSPLANT;  Surgeon: Benson Matson MD;  [facility];  Service: Transplant;  Laterality: N/A;    LAPAROTOMY, EXPLORATORY, AFTER LIVER TRANSPLANT N/A 10/6/2018    [facility]    LIVER TRANSPLANT N/A 10/4/2018    Procedure: TRANSPLANT, LIVER;  Surgeon: Yony Burns MD;  Location: [facility] OR [address] [place] [address];  Service: Transplant;  Laterality: N/A;    TRANSPLANT, LIVER N/A 10/4/2018    Performed by Yony Burns MD at [facility] OR [address] [place] [address]       Review of patient's allergies indicates:  No Known Allergies  Current Facility-Administered Medications   Medication Frequency    [START ON 11/6/2018] atovaquone suspension 1,500 mg Daily    bisacodyl EC tablet 10 mg Daily PRN    bisacodyl suppository 10 mg Daily PRN    calcium carbonate 200 mg calcium (500 mg) chewable tablet 500 mg TID PRN    dextrose 50% injection 12.5 g PRN    dextrose 50% injection 25 g PRN    docusate sodium capsule 100 mg Daily    entecavir tablet 0.5 mg Q72H    fluconazole tablet 200 mg Daily    glucagon (human recombinant) injection 1 mg PRN    glucose chewable tablet 16 g PRN    glucose chewable tablet 24 g PRN    insulin aspart U-100 pen 0-5 Units QID (AC + HS) PRN    insulin aspart U-100 pen 11-13 Units TIDWM    magnesium oxide tablet 800 mg BID    midodrine tablet 15 mg TID    mirtazapine tablet 7.5 mg QHS    ondansetron disintegrating tablet 8 mg Q8H PRN    ondansetron injection 4 mg Q8H PRN    oxyCODONE immediate release tablet 5 mg Q4H  PRN    oxyCODONE immediate release tablet Tab 10 mg Q4H PRN    pantoprazole injection 40 mg BID    polyethylene glycol packet 17 g Daily PRN    polyethylene glycol packet 17 g Daily    [START ON 11/6/2018] predniSONE tablet 20 mg Daily    Followed by    [START ON 11/10/2018] predniSONE tablet 15 mg Daily    Followed by    [START ON 11/17/2018] predniSONE tablet 10 mg Daily    Followed by    [START ON 11/24/2018] predniSONE tablet 5 mg Daily    Followed by    [START ON 12/1/2018] predniSONE tablet 2.5 mg Daily    promethazine (PHENERGAN) 6.25 mg in dextrose 5 % 50 mL IVPB Q6H PRN    sodium chloride 0.9% flush 3 mL PRN    tacrolimus capsule 1 mg BID    ursodiol capsule 300 mg BID     Family History     None        Tobacco Use    Smoking status: Never Smoker    Smokeless tobacco: Never Used   Substance and Sexual Activity    Alcohol use: No     Frequency: Never    Drug use: No    Sexual activity: Not on file     Review of Systems   Constitutional: Positive for activity change and fatigue. Negative for chills and fever.   Respiratory: Negative for chest tightness and shortness of breath.    Cardiovascular: Positive for leg swelling. Negative for chest pain.   Gastrointestinal: Positive for abdominal distention, abdominal pain and constipation.   Genitourinary: Negative for urgency.   Musculoskeletal: Positive for arthralgias.   Skin: Positive for wound.   Allergic/Immunologic: Positive for immunocompromised state.   Neurological: Positive for weakness.   Psychiatric/Behavioral: Negative for agitation and behavioral problems. The patient is nervous/anxious.      Objective:     Vital Signs (Most Recent):  Temp: 97.3 °F (36.3 °C) (11/05/18 1312)  Pulse: 74 (11/05/18 1312)  Resp: 16 (11/05/18 1312)  BP: 122/73 (11/05/18 1312)  SpO2: 97 % (11/05/18 1312)  O2 Device (Oxygen Therapy): room air (11/05/18 1312) Vital Signs (24h Range):  Temp:  [96.1 °F (35.6 °C)-97.9 °F (36.6 °C)] 97.3 °F (36.3 °C)  Pulse:   [65-89] 74  Resp:  [8-21] 16  SpO2:  [97 %-100 %] 97 %  BP: (104-140)/(54-87) 122/73     Weight: 73.1 kg (161 lb 2.5 oz) (11/05/18 0500)  Body mass index is 28.55 kg/m².  Body surface area is 1.8 meters squared.    I/O last 3 completed shifts:  In: 1687.9 [P.O.:1200; Blood:487.9]  Out: 1305 [Urine:1305]    Physical Exam   Constitutional: She is oriented to person, place, and time. She appears cachectic. She is easily aroused. She appears toxic. She has a sickly appearance. She appears ill. She appears distressed.   HENT:   Head: Normocephalic and atraumatic.   Eyes: Scleral icterus is present.   Cardiovascular: Normal rate, regular rhythm and normal heart sounds.   Pulmonary/Chest: Effort normal and breath sounds normal. No respiratory distress.   Abdominal: Bowel sounds are normal. She exhibits distension. There is tenderness.       OLT scar R abdomen across midline - CDI   Musculoskeletal: She exhibits edema. She exhibits no tenderness.   BL LE edema, +3 pitting    Neurological: She is alert, oriented to person, place, and time and easily aroused.   Skin: Skin is warm. She is not diaphoretic.   Nursing note and vitals reviewed.      Significant Labs:  ABGs: No results for input(s): PH, PCO2, HCO3, POCSATURATED, BE in the last 168 hours.  BMP:   Recent Labs   Lab 11/05/18  0530   *   CL 98   CO2 22*   BUN 80*   CREATININE 2.1*   CALCIUM 8.8   MG 2.3     Cardiac Markers: No results for input(s): CKMB, TROPONINT, MYOGLOBIN in the last 168 hours.  CBC:   Recent Labs   Lab 11/05/18  0530   WBC 2.50*   RBC 2.86*   HGB 8.4*   HCT 26.2*   PLT 90*   MCV 92   MCH 29.4   MCHC 32.1     CMP:   Recent Labs   Lab 11/05/18  0530   *   CALCIUM 8.8   ALBUMIN 3.7   PROT 5.2*   *   K 3.7   CO2 22*   CL 98   BUN 80*   CREATININE 2.1*   ALKPHOS 91   ALT 12   AST 14   BILITOT 1.8*     Coagulation:   Recent Labs   Lab 11/05/18  0530   INR 1.4*     LFTs:   Recent Labs   Lab 11/05/18  0530   ALT 12   AST 14   ALKPHOS 91    BILITOT 1.8*   PROT 5.2*   ALBUMIN 3.7     Microbiology Results (last 7 days)     ** No results found for the last 168 hours. **        No results for input(s): COLORU, CLARITYU, SPECGRAV, PHUR, PROTEINUA, GLUCOSEU, BILIRUBINCON, BLOODU, WBCU, RBCU, BACTERIA, MUCUS, NITRITE, LEUKOCYTESUR, UROBILINOGEN, HYALINECASTS in the last 168 hours.  All labs within the past 24 hours have been reviewed.    Significant Imaging:  Labs: Reviewed  Upper GI: Reviewed - no evidence of gross bleed observed    Assessment/Plan:      Post LT - Acute renal failure    Post-LT EVA is multifactorial in origin and has been related to the severity of liver disease, toxicity of immunosuppressive therapy and hepatic ischaemia reperfusion injury might be a driving force in the aetiology of post-LT EVA.      - acute gradual worsening of renal function s/p OLT  -pt received 1 unit BCP 11/3  - 10/06 was taken to the OR for possibility of billiary leak and post op complication due to hypercapnic hypoxic respiratory failure, no blood pressure drop intraoperatively per anesthesiology notes. Patient has not been needing vasopressors to maintain BP  - Creatinine on arrival 0.8 and has been up trending   - BUN/cr (10/11/18): 91/1.9; (10/12/18): 100/2.0; (10/13/18): 110/2.4  - Tacrolimus 26.9 (10/08); 6.8 (10/12); 5.6 (10/13), remains in none nephrotoxic levels   - Protein Creatine ratio: 0.82  - UA with hematuria, proteinuria, RBC and amorphous casts   - Fena 0.5%; consistent with hypovolemia   - Renal US negative for obstruction, stricture or, hydronephrosis    Assessment: Acute Renal failure in setting of hepatic ischemia reperfusion injury and poor perfusion status vs acute tubular necrosis from immunosuppressive agents      Plan:  -No indication for CRRT today   -   Recheck afternoon CBC as patient is hypoperfusing and would need blood  - Please document urine output  -Patient azotemia likely secondary to her H/H dropping from GI bleed  -Renal diet     - avoid nephrotoxic drugs, ACEI/ARB, NSAIDS  - adjust drugs to GFR  - Agree with fluid restriction of 1500 cc/day  - Strict I/O's       This plan was discussed with patient and family at bedside using the [facility]                                   Thank you for your consult. Will continue to follow and reassess tomorrow     Charles Benitez MD  Nephrology  [facility]    ATTENDING PHYSICIAN ATTESTATION  I have personally interviewed and examined the patient. I thoroughly reviewed the demographic, clinical, laboratorial and imaging information available in medical records. I agree with the assessment and recommendations provided by the subspecialty resident. Dr. Benitez was under my supervision. Stable azotemia, no uremia.

## 2018-11-05 NOTE — PT/OT/SLP PROGRESS
Occupational Therapy   Treatment    Name: Scarlett Reddy  MRN: [id number]  Admitting Diagnosis:  Liver transplanted  Day of Surgery    Recommendations:     Discharge Recommendations: home health OT  Discharge Equipment Recommendations:  walker, rolling  Barriers to discharge:  None    Subjective     Communicated with: RN prior to session.  Pain/Comfort:  · Pain Rating 1: 0/10  · Pain Rating Post-Intervention 1: 0/10    Patients cultural, spiritual, Roman [employer] conflicts given the current situation: none reported    Objective:     Patient found with: telemetry, pulse ox (continuous), central line    General Precautions: Standard, fall, contact   Orthopedic Precautions:N/A   Braces: N/A     Occupational Performance:    Bed Mobility:    · NT, pt found seated UIC upon arrival    Functional Mobility/Transfers:  · Patient completed Sit <> Stand Transfer with maximal assistance  with  no AD.   ( From bedside chair)  · Functional Mobility: Pt completed functional mobility from bedside chair < stretcher in hallway with CGA using RW for balance and safety. Transport present to take pt off floor for EGD.    Activities of Daily Living:  · Upper Body Dressing: minimum assistance to casper gown like jacket while seated UIC  * Pt declined participation of self care tasks in bathroom this date    Patient left supine on strecher with all lines intact, RN notified and RN and   present    [facility] 6 Click:  [facility] Total Score: 16    Treatment & Education:  -Pt edu on OT role/POC  -Importance of OOB activity with staff assistance  -Safety during functional t/f and mobility  - White board updated  - Multiple self care tasks completed-- assistance level noted above  - All questions/concerns answered within OT scope of practice  - Pt completed BUE/BLE AROM exercises while seated UIC including: 1x15 reps in shoulder flexion, elbow flexion/extension, chest press, and scap squeezes; hip flexion, knee flexion/extension, and ankle pumps. Pt  tolerated well with min rest breaks between each exercise.   Education:    Assessment:     Scarlett Reddy is a 68 y.o. female with a medical diagnosis of Liver transplanted.  She presents with fatigue however willing to participate in BUE/BLE exercises this date.  Performance deficits affecting function are weakness, gait instability, impaired balance, impaired endurance, impaired self care skills, impaired functional mobilty, edema, decreased ROM.  Pt would benefit from HHOT following d/c to continue to progress towards goals and ensure safe transition to home environment.     Rehab Prognosis:  Good; patient would benefit from acute skilled OT services to address these deficits and reach maximum level of function.       Plan:     Patient to be seen 4 x/week to address the above listed problems via self-care/home management, therapeutic activities, therapeutic exercises, neuromuscular re-education  · Plan of Care Expires: 11/07/18  · Plan of Care Reviewed with: patient    This Plan of care has been discussed with the patient who was involved in its development and understands and is in agreement with the identified goals and treatment plan    GOALS:   Multidisciplinary Problems     Occupational Therapy Goals        Problem: Occupational Therapy Goal    Goal Priority Disciplines Outcome Interventions   Occupational Therapy Goal     OT, PT/OT Ongoing (interventions implemented as appropriate)    Description:  Goals to be met by: 11/7/18 ( revised 10/24/18)    Patient will increase functional independence with ADLs by performing:    Feeding with [place].  UE Dressing with Supervision.  LE Dressing with Supervision.  Grooming while standing at sink with Supervision.  Toileting from toilet with Supervision for hygiene and clothing management.   Toilet transfer to toilet with Supervision.                       Time Tracking:     OT Date of Treatment: 11/05/18  OT Start Time: 1039  OT Stop Time: 1059  OT Total Time  (min): 20 min    Billable Minutes:Therapeutic Exercise 20    Laura corado OT  11/5/2018

## 2018-11-05 NOTE — ASSESSMENT & PLAN NOTE
- H&H cont to fluctuate.   - FOBT +.  - EGD 10/18 - showed diffuse bleeding with ulcerated duodenal mucosa.  - EGD 11/5 - improvement seen, no active bleeding.  - 1 unit PRBCs transfused 10/27 and 11/3.   - H&H with good response.   - Resume heparin sq injections 11/6.   - Continue to monitor.

## 2018-11-05 NOTE — ASSESSMENT & PLAN NOTE
- paracentesis 10/1, 5L removed, no fluid sent for analysis.  - pt remains volume overloaded.   - Liver US 11/6 with severe ascites.   - Paracentesis 11/7 with 8.9 L removed. Wbc 39, segs 16%. Gram stain - no organisms seen and no WBC. Aerobic culture no growth. Anaerobic and     fungal cultures pending.

## 2018-11-05 NOTE — ASSESSMENT & PLAN NOTE
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   - POD 1 US: increased arterial resistive indices, suggestive of edema vs acute rejection vs congestion.  - Repeat US 10/9 with continued elevation of RIs.  - LFTs stable.  - T bili trending down.  - Repeat liver US (10/11): elevated RIs.  - Liver US 11/6 to assess for ascites. No arterial flow within the liver allograft concerning for interval thrombosis/occlusion of the arterial system. Severe ascites seen.  - Liver US 11/8: arterial flow seen w/ elevated RIs  - LFT remain stable. No CTA at this time due to EVA and normal liver function.

## 2018-11-05 NOTE — SUBJECTIVE & OBJECTIVE
Past Medical History:   Diagnosis Date    Anemia requiring transfusions 8/16/2018    Ascites     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 8/15/2018    Cirrhosis     Cough 8/30/2018    Esophageal varices     Esophageal varices without bleeding 8/15/2018    Hepatic encephalopathy     Hepatitis B     Hepatitis D virus infection     Hypersplenism     Hyponatremia     Hypotension     Liver transplant candidate     Neutropenia     Portal hypertension     Severe malnutrition 8/16/2018    Type 2 diabetes mellitus, with long-term current use of insulin 8/15/2018       Past Surgical History:   Procedure Laterality Date    EGD (ESOPHAGOGASTRODUODENOSCOPY) N/A 10/18/2018    Performed by Chung Robins MD at [facility] ([address] [place] [address])    EGD (ESOPHAGOGASTRODUODENOSCOPY) N/A 8/17/2018    Performed by Travon Delgadillo MD at [facility] ([address] [place] [address])    ESOPHAGOGASTRODUODENOSCOPY N/A 8/17/2018    Procedure: EGD (ESOPHAGOGASTRODUODENOSCOPY);  Surgeon: Travon Delgadillo MD;  Location: [address]);  Service: Endoscopy;  Laterality: N/A;    ESOPHAGOGASTRODUODENOSCOPY N/A 10/18/2018    Procedure: EGD (ESOPHAGOGASTRODUODENOSCOPY);  Surgeon: Chung Robins MD;  Location: [facility] ([address] [place] [address]);  Service: Endoscopy;  Laterality: N/A;    EXPLORATORY LAPAROTOMY AFTER LIVER TRANSPLANTATION N/A 10/6/2018    Procedure: LAPAROTOMY, EXPLORATORY, AFTER LIVER TRANSPLANT;  Surgeon: Benson Matson MD;  Location: [facility] OR [address] [place] [address];  Service: Transplant;  Laterality: N/A;    LAPAROTOMY, EXPLORATORY, AFTER LIVER TRANSPLANT N/A 10/6/2018    Performed by Benson Matson MD at [facility] OR [address] [place] [address]    LIVER TRANSPLANT N/A 10/4/2018    Procedure: TRANSPLANT, LIVER;  Surgeon: Yony Burns MD;  Location: [facility] OR [address] [place] [address];  Service: Transplant;  Laterality: N/A;    TRANSPLANT, LIVER N/A 10/4/2018    Performed by Yony Burns MD at [facility] OR [address] [place] [address]       Review of patient's allergies indicates:  No Known Allergies  Current  Facility-Administered Medications   Medication Frequency    [START ON 11/6/2018] atovaquone suspension 1,500 mg Daily    bisacodyl EC tablet 10 mg Daily PRN    bisacodyl suppository 10 mg Daily PRN    calcium carbonate 200 mg calcium (500 mg) chewable tablet 500 mg TID PRN    dextrose 50% injection 12.5 g PRN    dextrose 50% injection 25 g PRN    docusate sodium capsule 100 mg Daily    entecavir tablet 0.5 mg Q72H    fluconazole tablet 200 mg Daily    glucagon (human recombinant) injection 1 mg PRN    glucose chewable tablet 16 g PRN    glucose chewable tablet 24 g PRN    insulin aspart U-100 pen 0-5 Units QID (AC + HS) PRN    insulin aspart U-100 pen 11-13 Units TIDWM    magnesium oxide tablet 800 mg BID    midodrine tablet 15 mg TID    mirtazapine tablet 7.5 mg QHS    ondansetron disintegrating tablet 8 mg Q8H PRN    ondansetron injection 4 mg Q8H PRN    oxyCODONE immediate release tablet 5 mg Q4H PRN    oxyCODONE immediate release tablet Tab 10 mg Q4H PRN    pantoprazole injection 40 mg BID    polyethylene glycol packet 17 g Daily PRN    polyethylene glycol packet 17 g Daily    [START ON 11/6/2018] predniSONE tablet 20 mg Daily    Followed by    [START ON 11/10/2018] predniSONE tablet 15 mg Daily    Followed by    [START ON 11/17/2018] predniSONE tablet 10 mg Daily    Followed by    [START ON 11/24/2018] predniSONE tablet 5 mg Daily    Followed by    [START ON 12/1/2018] predniSONE tablet 2.5 mg Daily    promethazine (PHENERGAN) 6.25 mg in dextrose 5 % 50 mL IVPB Q6H PRN    sodium chloride 0.9% flush 3 mL PRN    tacrolimus capsule 1 mg BID    ursodiol capsule 300 mg BID     Family History     None        Tobacco Use    Smoking status: Never Smoker    Smokeless tobacco: Never Used   Substance and Sexual Activity    Alcohol use: No     Frequency: Never    Drug use: No    Sexual activity: Not on file     Review of Systems   Constitutional: Positive for activity change and  fatigue. Negative for chills and fever.   Respiratory: Negative for chest tightness and shortness of breath.    Cardiovascular: Positive for leg swelling. Negative for chest pain.   Gastrointestinal: Positive for abdominal distention, abdominal pain and constipation.   Genitourinary: Negative for urgency.   Musculoskeletal: Positive for arthralgias.   Skin: Positive for wound.   Allergic/Immunologic: Positive for immunocompromised state.   Neurological: Positive for weakness.   Psychiatric/Behavioral: Negative for agitation and behavioral problems. The patient is nervous/anxious.      Objective:     Vital Signs (Most Recent):  Temp: 97.3 °F (36.3 °C) (11/05/18 1312)  Pulse: 74 (11/05/18 1312)  Resp: 16 (11/05/18 1312)  BP: 122/73 (11/05/18 1312)  SpO2: 97 % (11/05/18 1312)  O2 Device (Oxygen Therapy): room air (11/05/18 1312) Vital Signs (24h Range):  Temp:  [96.1 °F (35.6 °C)-97.9 °F (36.6 °C)] 97.3 °F (36.3 °C)  Pulse:  [65-89] 74  Resp:  [8-21] 16  SpO2:  [97 %-100 %] 97 %  BP: (104-140)/(54-87) 122/73     Weight: 73.1 kg (161 lb 2.5 oz) (11/05/18 0500)  Body mass index is 28.55 kg/m².  Body surface area is 1.8 meters squared.    I/O last 3 completed shifts:  In: 1687.9 [P.O.:1200; Blood:487.9]  Out: 1305 [Urine:1305]    Physical Exam   Constitutional: She is oriented to person, place, and time. She appears cachectic. She is easily aroused. She appears toxic. She has a sickly appearance. She appears ill. She appears distressed.   HENT:   Head: Normocephalic and atraumatic.   Eyes: Scleral icterus is present.   Cardiovascular: Normal rate, regular rhythm and normal heart sounds.   Pulmonary/Chest: Effort normal and breath sounds normal. No respiratory distress.   Abdominal: Bowel sounds are normal. She exhibits distension. There is tenderness.       OLT scar R abdomen across midline - CDI   Musculoskeletal: She exhibits edema. She exhibits no tenderness.   BL LE edema, +3 pitting    Neurological: She is alert,  oriented to person, place, and time and easily aroused.   Skin: Skin is warm. She is not diaphoretic.   Nursing note and vitals reviewed.      Significant Labs:  ABGs: No results for input(s): PH, PCO2, HCO3, POCSATURATED, BE in the last 168 hours.  BMP:   Recent Labs   Lab 11/05/18  0530   *   CL 98   CO2 22*   BUN 80*   CREATININE 2.1*   CALCIUM 8.8   MG 2.3     Cardiac Markers: No results for input(s): CKMB, TROPONINT, MYOGLOBIN in the last 168 hours.  CBC:   Recent Labs   Lab 11/05/18 0530   WBC 2.50*   RBC 2.86*   HGB 8.4*   HCT 26.2*   PLT 90*   MCV 92   MCH 29.4   MCHC 32.1     CMP:   Recent Labs   Lab 11/05/18 0530   *   CALCIUM 8.8   ALBUMIN 3.7   PROT 5.2*   *   K 3.7   CO2 22*   CL 98   BUN 80*   CREATININE 2.1*   ALKPHOS 91   ALT 12   AST 14   BILITOT 1.8*     Coagulation:   Recent Labs   Lab 11/05/18  0530   INR 1.4*     LFTs:   Recent Labs   Lab 11/05/18  0530   ALT 12   AST 14   ALKPHOS 91   BILITOT 1.8*   PROT 5.2*   ALBUMIN 3.7     Microbiology Results (last 7 days)     ** No results found for the last 168 hours. **        No results for input(s): COLORU, CLARITYU, SPECGRAV, PHUR, PROTEINUA, GLUCOSEU, BILIRUBINCON, BLOODU, WBCU, RBCU, BACTERIA, MUCUS, NITRITE, LEUKOCYTESUR, UROBILINOGEN, HYALINECASTS in the last 168 hours.  All labs within the past 24 hours have been reviewed.    Significant Imaging:  Labs: Reviewed  Upper GI: Reviewed - no evidence of gross bleed observed

## 2018-11-05 NOTE — ASSESSMENT & PLAN NOTE
- Creatinine on arrival 0.8 and trended up.  - Nephrology consulted.  - Renal US (10/11): no hydronephrosis.  - Echo 10/12: diastolic dysfunction, likely 2/2 to ARF.  - CRRT 10/14; SLED 10/17, 10/20. --> no improvements noted in kidney fxn.  - 24 hr urine creatine collection: CrCl of 7.   - CRRT 10/31 with 1L removed.   - Lasix 40 BID (restarted 11/1).  - Lasix on hold as of 11/3 per nephrology recs.  - Lasix restarted at 40 mg BID on 11/9 per nephrology recs

## 2018-11-05 NOTE — ASSESSMENT & PLAN NOTE
- Valcyte for CMV prophylaxis--> holding (10/31) for leukopenia. Pt will need weekly CMV PCR while discontinued. Next due, 11/15.  - Bactrim for PCP prophylaxis (MWF).--> holding (10/31) for leukopenia.   - Start Atovaquone 11/6.  - Fluconazole for fungal prophylaxis.

## 2018-11-05 NOTE — SUBJECTIVE & OBJECTIVE
Scheduled Meds:   [START ON 11/6/2018] atovaquone  1,500 mg Oral Daily    docusate sodium  100 mg Oral Daily    entecavir  0.5 mg Oral Q72H    fluconazole  200 mg Oral Daily    insulin aspart U-100  11-13 Units Subcutaneous TIDWM    magnesium oxide  800 mg Oral BID    midodrine  15 mg Oral TID    mirtazapine  7.5 mg Oral QHS    pantoprazole  40 mg Intravenous BID    polyethylene glycol  17 g Oral Daily    [START ON 11/6/2018] predniSONE  20 mg Oral Daily    Followed by    [START ON 11/10/2018] predniSONE  15 mg Oral Daily    Followed by    [START ON 11/17/2018] predniSONE  10 mg Oral Daily    Followed by    [START ON 11/24/2018] predniSONE  5 mg Oral Daily    Followed by    [START ON 12/1/2018] predniSONE  2.5 mg Oral Daily    tacrolimus  1 mg Oral BID    ursodiol  300 mg Oral BID     Continuous Infusions:  PRN Meds:bisacodyl, bisacodyl, calcium carbonate, dextrose 50%, dextrose 50%, diphenhydrAMINE, fentaNYL, glucagon (human recombinant), glucose, glucose, HYDROmorphone, insulin aspart U-100, meperidine, ondansetron, ondansetron, ondansetron, oxyCODONE, oxyCODONE, polyethylene glycol, promethazine (PHENERGAN) IVPB, sodium chloride 0.9%    Review of Systems   Constitutional: Positive for activity change, appetite change (improving) and fatigue. Negative for chills and fever.   HENT: Negative for congestion and facial swelling.    Eyes: Negative for pain, discharge and visual disturbance.   Respiratory: Negative for cough, chest tightness, shortness of breath and wheezing.    Cardiovascular: Positive for leg swelling. Negative for chest pain and palpitations.   Gastrointestinal: Positive for abdominal distention and abdominal pain. Negative for blood in stool, constipation, diarrhea, nausea and vomiting.   Endocrine: Negative.    Genitourinary: Negative for decreased urine volume, difficulty urinating and dysuria.   Musculoskeletal: Negative for arthralgias and back pain.   Skin: Positive for wound.    Allergic/Immunologic: Positive for immunocompromised state.   Neurological: Positive for weakness. Negative for tremors and headaches.   Psychiatric/Behavioral: Negative for confusion and sleep disturbance.     Objective:     Vital Signs (Most Recent):  Temp: 97.3 °F (36.3 °C) (11/05/18 1312)  Pulse: 74 (11/05/18 1312)  Resp: 16 (11/05/18 1312)  BP: 122/73 (11/05/18 1312)  SpO2: 97 % (11/05/18 1312) Vital Signs (24h Range):  Temp:  [96.1 °F (35.6 °C)-97.9 °F (36.6 °C)] 97.3 °F (36.3 °C)  Pulse:  [65-89] 74  Resp:  [8-21] 16  SpO2:  [97 %-100 %] 97 %  BP: (104-140)/(54-87) 122/73     Weight: 73.1 kg (161 lb 2.5 oz)  Body mass index is 28.55 kg/m².    Intake/Output - Last 3 Shifts       11/03 0700 - 11/04 0659 11/04 0700 - 11/05 0659 11/05 0700 - 11/06 0659    P.O. 960 720 120    I.V. (mL/kg)   100 (1.4)    Blood  487.9     Total Intake(mL/kg) 960 (13) 1207.9 (16.5) 220 (3)    Urine (mL/kg/hr) 600 (0.3) 980 (0.6) 100 (0.2)    Emesis/NG output   0    Stool 0 0 0    Total Output 600 980 100    Net +360 +227.9 +120           Urine Occurrence 2 x  1 x    Stool Occurrence 1 x 1 x 1 x    Emesis Occurrence   1 x          Physical Exam   Constitutional: She is oriented to person, place, and time. She appears well-developed. No distress.   Temporal and distal extremity muscle wasting   HENT:   Head: Normocephalic and atraumatic.   Eyes: EOM are normal. Scleral icterus is present.   Neck: Normal range of motion.   Cardiovascular: Normal rate and regular rhythm.   No murmur heard.  Pulmonary/Chest: Effort normal and breath sounds normal. No respiratory distress. She has no wheezes.   Abdominal: Soft. Bowel sounds are normal. She exhibits distension. There is tenderness. There is no guarding.   Chevron incision w/ steri strips, minimal serous leakage from R side  RLQ VALORIE drain sites c/d/i with sutures   Musculoskeletal: Normal range of motion. She exhibits edema (3+ BLE).   Neurological: She is alert and oriented to person,  place, and time.   Skin: Skin is warm and dry. She is not diaphoretic.   Nursing note and vitals reviewed.      Laboratory:  Immunosuppressants         Stop Route Frequency     tacrolimus capsule 1 mg      -- Oral 2 times daily        CBC:   Recent Labs   Lab 11/05/18  0530   WBC 2.50*   RBC 2.86*   HGB 8.4*   HCT 26.2*   PLT 90*   MCV 92   MCH 29.4   MCHC 32.1     CMP:   Recent Labs   Lab 11/05/18  0530   *   CALCIUM 8.8   ALBUMIN 3.7   PROT 5.2*   *   K 3.7   CO2 22*   CL 98   BUN 80*   CREATININE 2.1*   ALKPHOS 91   ALT 12   AST 14   BILITOT 1.8*     Labs within the past 24 hours have been reviewed.    Diagnostic Results:  I have personally reviewed all pertinent imaging studies.

## 2018-11-05 NOTE — NURSING TRANSFER
Nursing Transfer Note      11/5/2018     Transfer To: [id number] from [facility]    Transfer via stretcher    Transfer with cardiac monitoring    Transported by PCT x 2    Medicines sent: None    Chart send with patient: Yes    Notified: Report called to RN    Patient reassessed at: 1246. Awake and alert. VSS. Denies pain or nausea. Stable condition. [place]  talked with patient by phone.  Upon arrival to floor: bed in lowest position

## 2018-11-05 NOTE — H&P
Short Stay Endoscopy History and Physical    PCP - Primary Doctor No     Procedure - EGD  ASA - per anesthesia  Mallampati - per anesthesia  History of Anesthesia problems - no  Family history Anesthesia problems -  no   Plan of anesthesia - General    HPI:  68 year old female with a history of ESLD secondary to Hep B s/p transplant on 10/4/18 who presents for EGD.    ROS:  Constitutional: No fevers, chills, No weight loss  CV: No chest pain  Pulm: No cough, No shortness of breath  Ophtho: No vision changes  GI: see HPI  Derm: No rash    Medical History:  has a past medical history of Anemia requiring transfusions (8/16/2018), Ascites, 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 (8/15/2018), Cirrhosis, Cough (8/30/2018), Esophageal varices, Esophageal varices without bleeding (8/15/2018), Hepatic encephalopathy, Hepatitis B, Hepatitis D virus infection, Hypersplenism, Hyponatremia, Hypotension, Liver transplant candidate, Neutropenia, Portal hypertension, Severe malnutrition (8/16/2018), and Type 2 diabetes mellitus, with long-term current use of insulin (8/15/2018).    Surgical History:  has a past surgical history that includes EGD (ESOPHAGOGASTRODUODENOSCOPY) (N/A, 10/18/2018); LAPAROTOMY, EXPLORATORY, AFTER LIVER TRANSPLANT (N/A, 10/6/2018); TRANSPLANT, LIVER (N/A, 10/4/2018); and EGD (ESOPHAGOGASTRODUODENOSCOPY) (N/A, 8/17/2018).    Family History: family history is not on file.. Otherwise no colon cancer, inflammatory bowel disease, or GI malignancies.    Social History:  reports that  has never smoked. she has never used smokeless tobacco. She reports that she does not drink alcohol or use drugs.    Review of patient's allergies indicates:  No Known Allergies    Medications:   Medications Prior to Admission   Medication Sig Dispense Refill Last Dose    insulin aspart U-100 (NOVOLOG FLEXPEN U-100 INSULIN) 100 unit/mL InPn pen Inject 20 units into the skin with breakfast,17 units with lunch, and 17  "with dinner Plus correction scale, max TDD 60 units daily 15 mL 1 10/1/2018    insulin glargine (LANTUS) 100 unit/mL injection Inject 20 Units into the skin every evening. 15 mL 3 9/30/2018    midodrine (PROAMATINE) 5 MG Tab Take 3 tablets (15 mg total) by mouth every 8 (eight) hours. 270 tablet 5 10/1/2018    OMEGA-3 FATTY ACIDS-EPA ORAL Take by mouth.   10/1/2018    rifAXIMin (XIFAXAN) 550 mg Tab Take 1 tablet (550 mg total) by mouth 2 (two) times daily. 60 tablet 5 10/1/2018    sodium bicarbonate 650 MG tablet Take 2 tablets (1,300 mg total) by mouth 3 (three) times daily. 120 tablet 11 10/1/2018    [DISCONTINUED] ergocalciferol, vitamin D2, (VITAMIN D ORAL) Take by mouth.   10/1/2018    blood sugar diagnostic (ACCU-CHEK DC) Strp 1 strip by Misc.(Non-Drug; Combo Route) route 3 (three) times daily.   9/17/2018    pen needle, diabetic (BD ULTRA-FINE CITLALI PEN NEEDLE) 32 gauge x 5/32" Ndle To use to inject insulin 4x daily 100 each 3 9/17/2018       Physical Exam:    Vital Signs:   Vitals:    11/05/18 1007   BP:    Pulse: 83   Resp:    Temp:        General Appearance: Well appearing in no acute distress  Eyes:    No scleral icterus  ENT: Neck supple, Lips, mucosa, and tongue normal; teeth and gums normal  Lungs: CTA anteriorly  Heart:  Regular rate, S1, S2 normal, no murmurs heard.  Abdomen: Incision from liver transplant with staples in place, abdomen distended but not tender  Extremities: Lower extremity edema  Skin: No rash    Labs:  Lab Results   Component Value Date    WBC 2.50 (L) 11/05/2018    HGB 8.4 (L) 11/05/2018    HCT 26.2 (L) 11/05/2018    PLT 90 (L) 11/05/2018    ALT 12 11/05/2018    AST 14 11/05/2018     (L) 11/05/2018    K 3.7 11/05/2018    CL 98 11/05/2018    CREATININE 2.1 (H) 11/05/2018    BUN 80 (H) 11/05/2018    CO2 22 (L) 11/05/2018    TSH 4.113 (H) 08/14/2018    INR 1.4 (H) 11/05/2018    HGBA1C 6.0 (H) 10/04/2018       I have explained the risks and benefits of endoscopy " procedures to the patient including but not limited to bleeding, perforation, infection, and death.    Michelle Leone M.D.  Gastroenterology Fellow, PGY-V  Pager: [phone]  [facility]

## 2018-11-05 NOTE — NURSING
To [facility] for EGD, NPO except meds mainatined.Phone report given to David RN earlier.  accompanied patient off unit. Tele notified of patient transfer

## 2018-11-05 NOTE — TREATMENT PLAN
Treatment Plan  11/05/2018  11:51 AM    EGD completed with impression and recs below.    Impression:             - Small hiatal hernia.  - Normal stomach.  - Normal examined duodenum.  - No specimens collected.    Recommendation:         - Return patient to [facility] for ongoing care.  - Advance diet as tolerated.  - Continue present medications.    Michelle Leone M.D.  Gastroenterology Fellow, PGY-V  Pager: [phone]  [facility]

## 2018-11-05 NOTE — ASSESSMENT & PLAN NOTE
- Stopped Cellcept, Bactrim, and Valcyte 10/31--> will resume once WBC improves and stable.   - Started Atovaquone 11/6.   - CMV PCR noted to be positive at 129. Will hold off on treatment with valcyte at this time. Repeat CMV PCR pending, 11/8.

## 2018-11-05 NOTE — PROGRESS NOTES
Wound care consult received from nursing for skin tear to lower abdomen.  Upon assessment with nursing, noted partial thickness skin tear to lower mid abdomen.  Recommend nursing to apply silicone foam dressing and change every 5 days.  Chevron incision approximated edges with staples. Continuous clear yellow drainage leaking from old RLQ VALORIE drain site that is sutured and nursing is managing drainage with ABD pads.  Leia-skin without skin breakdown at this time. Discussed with nurse that if needed, the drainage could be managed with a wound pouch.  Nurse stated the abd pads seems adequate at this time.   Spoke with Kimber Valle NP regarding assessment and offered to pouch old VALORIE drain site if needed. Kimber will re-consult wound care team if needed.   [id number]       11/05/18 1400       Wound 11/05/18 Skin Tear lower Abdomen   Date First Assessed: 11/05/18   Pre-existing: No  Primary Wound Type: Skin Tear  Orientation: lower  Location: Abdomen   Wound Image    Wound WDL ex   Dressing Appearance Intact   Drainage Amount Small   Drainage Characteristics/Odor Clear   Appearance Pink   Tissue loss description Partial thickness   Wound Length (cm) 5.5 cm   Wound Width (cm) 4 cm   Wound Depth (cm) 0.1 cm   Wound Volume (cm^3) 2.2 cm^3   Dressing Applied;Foam  (silicone)   Dressing Change Due 11/10/18

## 2018-11-05 NOTE — ASSESSMENT & PLAN NOTE
- EGD 10/18- sig for ulcerated duodenal mucosa.   - Biopsy 10/18- no infection detected.  - Received 14 day course of amoxicillin/levo (completed 11/2) for potential H. Pylori.   - Cont protonix 40 BID.  - EGD 11/5. Results negative per GI.

## 2018-11-05 NOTE — ASSESSMENT & PLAN NOTE
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      - acute gradual worsening of renal function s/p OLT  -pt received 1 unit BCP 11/3  - 10/06 was taken to the OR for possibility of billiary leak and post op complication due to hypercapnic hypoxic respiratory failure, no blood pressure drop intraoperatively per anesthesiology notes. Patient has not been needing vasopressors to maintain BP  - Creatinine on arrival 0.8 and has been up trending   - BUN/cr (10/11/18): 91/1.9; (10/12/18): 100/2.0; (10/13/18): 110/2.4  - Tacrolimus 26.9 (10/08); 6.8 (10/12); 5.6 (10/13), remains in none nephrotoxic levels   - Protein Creatine ratio: 0.82  - UA with hematuria, proteinuria, RBC and amorphous casts   - Fena 0.5%; consistent with hypovolemia   - Renal US negative for obstruction, stricture or, hydronephrosis    Assessment: Acute Renal failure in setting of hepatic ischemia reperfusion injury and poor perfusion status vs acute tubular necrosis from immunosuppressive agents      Plan:  -No indication for CRRT today   -   Recheck afternoon CBC as patient is hypoperfusing and would need blood  - Please document urine output  -Patient azotemia likely secondary to her H/H dropping from GI bleed  -Renal diet    - avoid nephrotoxic drugs, ACEI/ARB, NSAIDS  - adjust drugs to GFR  - Agree with fluid restriction of 1500 cc/day  - Strict I/O's       This plan was discussed with patient and family at bedside using the [facility]

## 2018-11-05 NOTE — PROGRESS NOTES
[facility]  Liver Transplant  Progress Note    Patient Name: Scarlett Reddy  MRN: [id number]  Admission Date: 10/1/2018  Hospital Length of Stay: 34 days  Code Status: Full Code  Primary Care Provider: Primary Doctor No  Post-Operative Day: 31    ORGAN:   LIVER  Disease Etiology: Cirrhosis: Type B and D  Donor Type:    - Brain Death  CDC High Risk:   No  Donor CMV Status:   Donor CMV Status: Positive  Donor HBcAB:   Negative  Donor HCV Status:   Negative  Donor HBV SETH: Negative  Donor HCV SETH: Negative  Whole or Partial: Whole Liver  Biliary Anastomosis: End to End  Arterial Anatomy: Replaced Left Hepatic and Right Hepatic  Subjective:     History of Present Illness:  Ms. Reddy is a 67 y/o female with PMH of ESLD secondary Hep B and D.  Listed for liver transplant with MELD 23.  Paracentesis 10/1 with 5L removed, no fluid sent for analysis.  Morning labs significant for hyponatremia, Na 119.  Pt admitted to LTS for sodium monitoring.      Hospital Course:  Hospital Course: 67 y/o F h/o HBV/delta cirrhosis s/p DBDLT 10/5/18 (CMV D+/R+, steroid induction, MMF/tacro/pred maintenance) with take back on 10/6 2/2 hyperbilirubinema and bilious VALORIE drainage, no leak identified. Post-op course c/b hypercapneic and hypoxic respiratory failure and was reintubated though quickly extubated now doing well. Liver bx (10/6) sig for Zone 3 hemorrhage and collapse, in addition to cholestasis. Transferred from ICU on POD #4. ID consulted to comment on positive diphtheroid culture from ascitic fluid (likely skin colonization) as well as ESBL Klebsiella pneumoniae in urine culture (10/4).  Pt asymptomatic and cell count from ascitic fluid unremarkable. Per ID recs, no need to treat diphtheroids or asymptomatic ESBL Kleb pneumo bacteriuria though pt has had 6 days of therapy which would cover these organisms. Mild peritransplant ascites- repeat US 10/6 with increased arterial resistive indices. Repeat US 10/9 w/  continued elevation in RIs and fluid collections. LFTs trending down nicely.     Acute gradual worsening of renal function s/p OLTx.Creatinine on arrival 0.8 and has been up trending. Nephrology consulted for post-op EVA. Cr cont to trend up but remains to have good UOP. Had stable response to lasiz diuresis.  BUN elevated requiring multiple rounds of dialysis (10/15, 10/17, 10/20, 10/31). No significant improvements in kidney function noted.   In addition, pt H/H cont to fluctuate requiring multiple blood transfusions (10/14, 10/16, 10/18, 10/19, 10/22, 10/27, 10/29).  Pt reported dark stools 10/15 but color has normalized. FOBT+. EGD 10/18 consistent with ulcerated mucosa in duodenum s/p coagulation. Concern for H. Pylori. Started Amoxicillin/levofloxacin (10/19, complete 11/2)). Remains on Protonix 40 mg BID. Biopsy negative for infection and CMV. Will cont close monitoring H/H, transfuse as needed with goal Hb > 7.0.  Of note, Urine cx + Klebsiella pneumoniae (10/13). Received 3 day course of ciprofloxacin, dc'd 10/19.   On 10/19 pm pt c/o crushing chest pain. Troponin 0.094, likely 2/2 ischemic stress. EKG NSR.     Interval history: No acute events overnight. Pt feeling well. EGD scheduled today per GI. H&H remains stable. Pt denies melena. Continue protonix BID. Heparin sq remains on hold. Cr 2.1 from 1.9. Nephrology following. Hold diuretics for now. Wbc count improving. Continue to hold Valcyte and cellcept. Bactrim dc'd. Start Atovaquone. CMV  on 11/1. Repeat weekly, next due 11/8. Monitor.     Scheduled Meds:   [START ON 11/6/2018] atovaquone  1,500 mg Oral Daily    docusate sodium  100 mg Oral Daily    entecavir  0.5 mg Oral Q72H    fluconazole  200 mg Oral Daily    insulin aspart U-100  11-13 Units Subcutaneous TIDWM    magnesium oxide  800 mg Oral BID    midodrine  15 mg Oral TID    mirtazapine  7.5 mg Oral QHS    pantoprazole  40 mg Intravenous BID    polyethylene glycol  17 g Oral  Daily    [START ON 11/6/2018] predniSONE  20 mg Oral Daily    Followed by    [START ON 11/10/2018] predniSONE  15 mg Oral Daily    Followed by    [START ON 11/17/2018] predniSONE  10 mg Oral Daily    Followed by    [START ON 11/24/2018] predniSONE  5 mg Oral Daily    Followed by    [START ON 12/1/2018] predniSONE  2.5 mg Oral Daily    tacrolimus  1 mg Oral BID    ursodiol  300 mg Oral BID     Continuous Infusions:  PRN Meds:bisacodyl, bisacodyl, calcium carbonate, dextrose 50%, dextrose 50%, diphenhydrAMINE, fentaNYL, glucagon (human recombinant), glucose, glucose, HYDROmorphone, insulin aspart U-100, meperidine, ondansetron, ondansetron, ondansetron, oxyCODONE, oxyCODONE, polyethylene glycol, promethazine (PHENERGAN) IVPB, sodium chloride 0.9%    Review of Systems   Constitutional: Positive for activity change, appetite change (improving) and fatigue. Negative for chills and fever.   HENT: Negative for congestion and facial swelling.    Eyes: Negative for pain, discharge and visual disturbance.   Respiratory: Negative for cough, chest tightness, shortness of breath and wheezing.    Cardiovascular: Positive for leg swelling. Negative for chest pain and palpitations.   Gastrointestinal: Positive for abdominal distention and abdominal pain. Negative for blood in stool, constipation, diarrhea, nausea and vomiting.   Genitourinary: Negative for decreased urine volume, difficulty urinating and dysuria.   Musculoskeletal: Negative for arthralgias and back pain.   Skin: Positive for wound.   Allergic/Immunologic: Positive for immunocompromised state.   Neurological: Positive for weakness. Negative for tremors and headaches.   Psychiatric/Behavioral: Negative for confusion and sleep disturbance.     Objective:     Vital Signs (Most Recent):  Temp: 97.3 °F (36.3 °C) (11/05/18 1312)  Pulse: 74 (11/05/18 1312)  Resp: 16 (11/05/18 1312)  BP: 122/73 (11/05/18 1312)  SpO2: 97 % (11/05/18 1312) Vital Signs (24h  Range):  Temp:  [96.1 °F (35.6 °C)-97.9 °F (36.6 °C)] 97.3 °F (36.3 °C)  Pulse:  [65-89] 74  Resp:  [8-21] 16  SpO2:  [97 %-100 %] 97 %  BP: (104-140)/(54-87) 122/73     Weight: 73.1 kg (161 lb 2.5 oz)  Body mass index is 28.55 kg/m².    Intake/Output - Last 3 Shifts       11/03 0700 - 11/04 0659 11/04 0700 - 11/05 0659 11/05 0700 - 11/06 0659    P.O. 960 720 120    I.V. (mL/kg)   100 (1.4)    Blood  487.9     Total Intake(mL/kg) 960 (13) 1207.9 (16.5) 220 (3)    Urine (mL/kg/hr) 600 (0.3) 980 (0.6) 100 (0.2)    Emesis/NG output   0    Stool 0 0 0    Total Output 600 980 100    Net +360 +227.9 +120           Urine Occurrence 2 x  1 x    Stool Occurrence 1 x 1 x 1 x    Emesis Occurrence   1 x          Physical Exam   Constitutional: She is oriented to person, place, and time. She appears well-developed.   Temporal and distal extremity muscle wasting   HENT:   Head: Normocephalic and atraumatic.   Eyes: EOM are normal. Pupils are equal, round, and reactive to light. Scleral icterus is present.   Neck: Normal range of motion.   Cardiovascular: Normal rate and regular rhythm.   No murmur heard.  Pulmonary/Chest: Effort normal and breath sounds normal. No respiratory distress. She has no wheezes.   Abdominal: Soft. Bowel sounds are normal. She exhibits distension. There is tenderness. There is no guarding.   Chevron incision w/ staples, CDI  Leakage from previous RLQ VALORIE site   Musculoskeletal: Normal range of motion. She exhibits edema (3+ BLE).   Neurological: She is alert and oriented to person, place, and time.   Skin: Skin is warm and dry.   Nursing note and vitals reviewed.      Laboratory:  Immunosuppressants         Stop Route Frequency     tacrolimus capsule 1 mg      -- Oral 2 times daily        CBC:   Recent Labs   Lab 11/05/18  0530   WBC 2.50*   RBC 2.86*   HGB 8.4*   HCT 26.2*   PLT 90*   MCV 92   MCH 29.4   MCHC 32.1     CMP:   Recent Labs   Lab 11/05/18  0530   *   CALCIUM 8.8   ALBUMIN 3.7   PROT  5.2*   *   K 3.7   CO2 22*   CL 98   BUN 80*   CREATININE 2.1*   ALKPHOS 91   ALT 12   AST 14   BILITOT 1.8*     Labs within the past 24 hours have been reviewed.    Diagnostic Results:  I have personally reviewed all pertinent imaging studies.    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   - POD 1 US: increased arterial resistive indices, suggestive of edema vs cute rejection vs congestion.  - Repeat US 10/9 with continued elevation of RIs.  - LFTs stable.  - T bili trending down.  - repeat liver US (10/11): elevated RIs.  - Now w/ Post LTx w/ EVA and possible slow oozing GIB.      Hyponatremia    - Na 119 on admit.  - Na was improving post transplant w HD.  - Na stable at this time.              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  -Tx w/ Entecavir- dose changed to q72 hours given renal function.         Ascites    - paracentesis 10/1, 5L removed, no fluid sent for analysis.  - pt remains volume overloaded.        Severe malnutrition    - dietary consulted.   - temporal and distal extremity muscle wasting and edema.  - encourage supplements and to increase nutritional intake.  - per nephrology, pt to be on low protein diet.   - Prealbumin reviewed.      Hypokalemia           Type 2 diabetes mellitus with diabetic polyneuropathy, with long-term current use of insulin    - continue home regimen.  - endocrine consulted.  - Pt off insulin drip at this time.  Apprec endo recs.        Leukopenia    - Stopped Cellcept, Bactrim, and Valcyte 10/31--> will resume once WBC improves and stable.   - Start Atovaquone 11/6.   - CMV PCR noted to be positive at 129. Will hold off on treatment with valcyte at this time. Repeat CMV 11/8.          Azotemia    - CRRT 10/31 for uremic toxin  clearance.  - BUN trending back up. Monitor closely.          Duodenal ulcer    - EGD 10/18- sig for ulcerated duodenal mucosa.   - Biopsy 10/18- no infection detected.  - Received 14 day course of amoxicillin/levo (completed 11/2) for potential H. Pylori.   - Cont protonix 40 BID.  - EGD scheduled 11/5. Results negative per GI.          UTI (urinary tract infection)    - Urine Cx 10/15 w/ Klebsiella pneumonia.  - Started Ciprofloxacin (10/17-10/19).   - Started on amoxicillin/levo for H. Pylori (completed abx 11/2).  - Pt asymptomatic at this time.       Acute blood loss anemia    - H&H cont to fluctuate.   - FOBT +.  - EGD 10/18 - showed diffuse bleeding with ulcerated duodenal mucosa.  - Monitoring w/ CBC q 12 to assess the need for transfusion.  - 1 unit PRBCs transfused 10/27 and 11/3.   - H&H with good response.             Post LT - Acute renal failure    - Creatinine on arrival 0.8 and trended up.  - Nephrology consulted.  - Renal US (10/11): no hydronephrosis.  - Echo 10/12: diastolic dysfunction, likely 2/2 to ARF.  - CRRT 10/14; SLED 10/17, 10/20. --> no improvements noted in kidney fxn.  - 24 hr urine creatine collection: CrCl of 7.   - CRRT 10/31 with 1L removed.   - Lasix 40 BID (restarted 11/1).  - Lasix now on hold as of 11/3 per nephrology recs.     Hypervolemia associated with renal insufficiency    - CRRT 10/14, 10/17, 10/20, 10/31.  - CXR 10/30: abundant fluid in b/l pleural space.  - Restarted Lasix 40 BID x 2 doses (11/2).  - Lasix now hold per nephrology recs.        EVA (acute kidney injury)    - Cr 2.1 from 1.9. Monitor.        Metabolic acidosis    - CRRT 10/14.  - SLED 10/17, 10/20, 10/31.         At risk for opportunistic infections    - Valcyte for CMV prophylaxis--> holding (10/31) for leukopenia. Pt will need weekly CMV PCR while discontinued. Next due, 11/8.  - Bactrim for PCP prophylaxis (MWF).--> holding (10/31) for leukopenia.   - Start Atovaquone 11/6.  - Fluconazole for fungal  prophylaxis.         Long-term use of immunosuppressant medication    -  Prograf: stopped 10/29. Resumed 11/2.  --> Will monitor for signs of toxic side effects, check daily troughs, and change meds accordingly.  - Prednisone: stopped 10/29. Started on Hydrocortisone  - Cellcept: stopped 10/31 for leukopenia. Will resume after improvements in WBC.         Prophylactic immunotherapy    - See long term use immunosuppresion.       Weakness    - see physical deconditioning.       Physical deconditioning    - PT/OT consulted.  - Recommend home health PT and rolling walker at discharge.       Hypotension    - continue Midodrine 15 mg TID. Monitor.       Anemia requiring transfusions    - Fluctuating H&H.    - Transfusions: 10/14; 10/16; 10/18; 10/19; 10/22; 10/27; 10/29, 10/31.  - FOBT +  - LDH elevated, Hapto < 10.  - EGD 10/18: ulcerated duodenal mucosa.   - Monitoring w/ CBC q12hr to assess the need for transfusion.  - H&H stable. No transfusion needed at this time.   - Consulted GI about this ongoing issue and further need of another EGD.  - EGD scheduled 11/5.             VTE Risk Mitigation (From admission, onward)        Ordered     IP VTE HIGH RISK PATIENT  Once      11/05/18 1145     Place sequential compression device  Until discontinued      10/05/18 0709          The patients clinical status was discussed at multidisplinary rounds, involving transplant surgery, transplant medicine, pharmacy, nursing, nutrition, and social work    Discharge Planning: not a candidate for dc at this time.       Kimber Valle NP  Liver Transplant  [facility]

## 2018-11-05 NOTE — PLAN OF CARE
Problem: Occupational Therapy Goal  Goal: Occupational Therapy Goal  Goals to be met by: 11/7/18 ( revised 10/24/18)    Patient will increase functional independence with ADLs by performing:    Feeding with [place].  UE Dressing with Supervision.  LE Dressing with Supervision.  Grooming while standing at sink with Supervision.  Toileting from toilet with Supervision for hygiene and clothing management.   Toilet transfer to toilet with Supervision.      Outcome: Ongoing (interventions implemented as appropriate)  Continue with POC.   Laura corado OT  11/5/2018

## 2018-11-05 NOTE — ANESTHESIA POSTPROCEDURE EVALUATION
"Anesthesia Post Evaluation    Patient: Scarlett Reddy    Procedure(s) Performed: Procedure(s) (LRB):  EGD (ESOPHAGOGASTRODUODENOSCOPY) (N/A)    Final Anesthesia Type: general  Patient location during evaluation: PACU  Patient participation: Yes- Able to Participate  Level of consciousness: awake and alert  Post-procedure vital signs: reviewed and stable  Pain management: adequate  Airway patency: patent  PONV status at discharge: No PONV  Anesthetic complications: no      Cardiovascular status: blood pressure returned to baseline  Respiratory status: unassisted  Hydration status: euvolemic  Follow-up not needed.        Visit Vitals  /73 (BP Location: Left arm, Patient Position: Lying)   Pulse 74   Temp 36.3 °C (97.3 °F) (Oral)   Resp 16   Ht 5' 3" (1.6 m)   Wt 73.1 kg (161 lb 2.5 oz)   LMP  (LMP Unknown)   SpO2 97%   Breastfeeding? No   BMI 28.55 kg/m²       Pain/Annette Score: Pain Assessment Performed: Yes (11/5/2018 12:49 PM)  Presence of Pain: denies (11/5/2018 12:49 PM)  Pain Rating Prior to Med Admin: 9 (11/4/2018  7:18 PM)  Pain Rating Post Med Admin: 4 (11/4/2018  8:18 PM)  Annette Score: 10 (11/5/2018 12:30 PM)        "

## 2018-11-05 NOTE — ASSESSMENT & PLAN NOTE
- HBsAg+, Received HBIG (last weekly dose 11/2).  -Tx w/ Entecavir- dose changed to q72 hours given renal function.

## 2018-11-05 NOTE — ANESTHESIA PREPROCEDURE EVALUATION
11/05/2018  Scarlett Reddy is a 68 y.o., female.    Anesthesia Evaluation         Review of Systems  Anesthesia Hx:  No problems with previous Anesthesia   Social:  Non-Smoker    Cardiovascular:   Exercise tolerance: good Hypertension Denies CAD.     Denies Angina.  Functional Capacity Can you climb two flights of stairs? ==> Yes    Pulmonary:   Denies Asthma.  Denies Recent URI.  Denies Sleep Apnea.    Renal/:   Chronic Renal Disease    Hepatic/GI:   Denies PUD. Denies Hiatal Hernia.  Denies GERD. Liver Disease,  Denies Hepatitis.    Neurological:   Denies CVA. Denies Seizures.    Endocrine:   Diabetes Denies Hypothyroidism.        Physical Exam  General:  Well nourished    Airway/Jaw/Neck:  Airway Findings: Mouth Opening: Normal Tongue: Normal  General Airway Assessment: Adult  Mallampati: I  TM Distance: Normal, at least 6 cm  Jaw/Neck Findings:  Neck ROM: Normal ROM  Neck Findings:     Eyes/Ears/Nose:  EYES/EARS/NOSE FINDINGS: Normal   Dental:  Dental Findings: In tact   Chest/Lungs:  Chest/Lungs Findings: Clear to auscultation     Heart/Vascular:  Heart Findings: Rate: Normal  Rhythm: Regular Rhythm  Sounds: Normal        Mental Status:  Mental Status Findings:  Alert and Oriented         Anesthesia Plan  Type of Anesthesia, risks & benefits discussed:  Anesthesia Type:  general  Patient's Preference: Proceed with anesthesia understanding that the risks are very small but could be serious or life threatening.  Intra-op Monitoring Plan: standard ASA monitors  Intra-op Monitoring Plan Comments:   Post Op Pain Control Plan:   Post Op Pain Control Plan Comments:   Induction:   IV  Beta Blocker:  Patient is not currently on a Beta-Blocker (No further documentation required).       Informed Consent: Patient understands risks and agrees with Anesthesia plan.  Questions answered. Anesthesia consent signed with  patient.  ASA Score: 3     Day of Surgery Review of History & Physical: I have interviewed and examined the patient. I have reviewed the patient's H&P dated:            Ready For Surgery From Anesthesia Perspective.

## 2018-11-05 NOTE — ASSESSMENT & PLAN NOTE
- dietary consulted.   - temporal and distal extremity muscle wasting and edema.  - encourage supplements and to increase nutritional intake.  - per nephrology, pt to be on low protein diet.   - Prealbumin reviewed.

## 2018-11-05 NOTE — ASSESSMENT & PLAN NOTE
- Urine Cx 10/15 w/ Klebsiella pneumonia.  - Started Ciprofloxacin (10/17-10/19).   - Started on amoxicillin/levo for H. Pylori (completed abx 11/2).  - Pt asymptomatic at this time.

## 2018-11-05 NOTE — ASSESSMENT & PLAN NOTE
- Fluctuating H&H.    - Transfusions: 10/14; 10/16; 10/18; 10/19; 10/22; 10/27; 10/29, 10/31  - FOBT +  - LDH elevated, Hapto < 10.  - EGD 10/18: ulcerated duodenal mucosa.   - Monitoring w/ CBC q12hr to assess the need for transfusion.  - H&H stable. No transfusion needed at this time.   - Consulted GI about this ongoing issue and further need of another EGD.  - EGD 11/5 unremarkable.

## 2018-11-05 NOTE — TRANSFER OF CARE
"Anesthesia Transfer of Care Note    Patient: Scarlett Reddy    Procedure(s) Performed: Procedure(s) (LRB):  EGD (ESOPHAGOGASTRODUODENOSCOPY) (N/A)    Patient location: PACU    Anesthesia Type: general    Transport from OR: Transported from OR on room air with adequate spontaneous ventilation    Post pain: adequate analgesia    Post assessment: no apparent anesthetic complications and tolerated procedure well    Post vital signs: stable    Level of consciousness: sedated    Nausea/Vomiting: no nausea/vomiting    Complications: none    Transfer of care protocol was followed      Last vitals:   Visit Vitals  BP (!) 110/55   Pulse 84   Temp 36.3 °C (97.3 °F) (Temporal)   Resp 16   Ht 5' 3" (1.6 m)   Wt 73.1 kg (161 lb 2.5 oz)   LMP  (LMP Unknown)   SpO2 99%   Breastfeeding? No   BMI 28.55 kg/m²     "

## 2018-11-05 NOTE — ASSESSMENT & PLAN NOTE
- PT/OT consulted.  - Recommend home health PT and rolling walker at discharge (orders placed 11/6).

## 2018-11-05 NOTE — ASSESSMENT & PLAN NOTE
- CRRT 10/14, 10/17, 10/20, 10/31.  - CXR 10/30: abundant fluid in b/l pleural space.  - Restarted Lasix 40 BID x 2 doses (11/2).  - Lasix held and then restarted 11/9 at 40 mg BID per nephrology recs.

## 2018-11-05 NOTE — [employer] PATIENT INSTRUCTIONS
Discharge Summary/Instructions after an Endoscopic Procedure  Patient Name: Scarlett Reddy  Patient MRN: [id number]  Patient YOB: 1950 Monday, November 05, 2018  Johann Mejia MD  RESTRICTIONS:  During your procedure today, you received medications for sedation.  These   medications may affect your judgment, balance and coordination.  Therefore,   for 24 hours, you have the following restrictions:   - DO NOT drive a car, operate machinery, make legal/financial decisions,   sign important papers or drink alcohol.    ACTIVITY:  Today: no heavy lifting, straining or running due to procedural   sedation/anesthesia.  The following day: return to full activity including work.  DIET:  Eat and drink normally unless instructed otherwise.     TREATMENT FOR COMMON SIDE EFFECTS:  - Mild abdominal pain, nausea, belching, bloating or excessive gas:  rest,   eat lightly and use a heating pad.  - Sore Throat: treat with throat lozenges and/or gargle with warm salt   water.  - Because air was used during the procedure, expelling large amounts of air   from your rectum or belching is normal.  - If a bowel prep was taken, you may not have a bowel movement for 1-3 days.    This is normal.  SYMPTOMS TO WATCH FOR AND REPORT TO YOUR PHYSICIAN:  1. Abdominal pain or bloating, other than gas cramps.  2. Chest pain.  3. Back pain.  4. Signs of infection such as: chills or fever occurring within 24 hours   after the procedure.  5. Rectal bleeding, which would show as bright red, maroon, or black stools.   (A tablespoon of blood from the rectum is not serious, especially if   hemorrhoids are present.)  6. Vomiting.  7. Weakness or dizziness.  GO DIRECTLY TO THE NEAREST EMERGENCY ROOM IF YOU HAVE ANY OF THE FOLLOWING:      Difficulty breathing              Chills and/or fever over 101 F   Persistent vomiting and/or vomiting blood   Severe abdominal pain   Severe chest pain   Black, tarry stools   Bleeding- more than one tablespoon   Any  other symptom or condition that you feel may need urgent attention  Your doctor recommends these additional instructions:  If any biopsies were taken, your doctors clinic will contact you in 1 to 2   weeks with any results.  - Return patient to hospital [facility] for ongoing care.   - Advance diet as tolerated.   - Continue present medications.   - Return to liver clinic at appointment to be scheduled.  For questions, problems or results please call your physician - Johann Mejia MD at Work:  [phone].  [facility], EMERGENCY ROOM PHONE NUMBER: [phone]  IF A COMPLICATION OR EMERGENCY SITUATION ARISES AND YOU ARE UNABLE TO REACH   YOUR PHYSICIAN - GO DIRECTLY TO THE EMERGENCY ROOM.  Johann Mejia MD  11/5/2018 11:44:06 AM  This report has been verified and signed electronically.  [employer]

## 2018-11-06 LAB
ALBUMIN SERPL BCP-MCNC: 3 G/DL
ALP SERPL-CCNC: 95 U/L
ALT SERPL W/O P-5'-P-CCNC: 15 U/L
ANION GAP SERPL CALC-SCNC: 12 MMOL/L
ANISOCYTOSIS BLD QL SMEAR: SLIGHT
AST SERPL-CCNC: 21 U/L
BASOPHILS NFR BLD: 0 %
BILIRUB SERPL-MCNC: 1.8 MG/DL
BUN SERPL-MCNC: 83 MG/DL
CALCIUM SERPL-MCNC: 8.6 MG/DL
CHLORIDE SERPL-SCNC: 100 MMOL/L
CO2 SERPL-SCNC: 22 MMOL/L
CREAT SERPL-MCNC: 2 MG/DL
DIFFERENTIAL METHOD: ABNORMAL
EOSINOPHIL NFR BLD: 0 %
ERYTHROCYTE [DISTWIDTH] IN BLOOD BY AUTOMATED COUNT: 17.2 %
EST. GFR  (AFRICAN AMERICAN): 28.7 ML/MIN/1.73 M^2
EST. GFR  (NON AFRICAN AMERICAN): 24.9 ML/MIN/1.73 M^2
GLUCOSE SERPL-MCNC: 180 MG/DL
HCT VFR BLD AUTO: 29.6 %
HGB BLD-MCNC: 9.5 G/DL
HYPOCHROMIA BLD QL SMEAR: ABNORMAL
IMM GRANULOCYTES # BLD AUTO: ABNORMAL K/UL
IMM GRANULOCYTES NFR BLD AUTO: ABNORMAL %
INR PPP: 1.1
LYMPHOCYTES NFR BLD: 1 %
MAGNESIUM SERPL-MCNC: 2 MG/DL
MCH RBC QN AUTO: 29.1 PG
MCHC RBC AUTO-ENTMCNC: 32.1 G/DL
MCV RBC AUTO: 91 FL
METAMYELOCYTES NFR BLD MANUAL: 1 %
MONOCYTES NFR BLD: 13 %
MYELOCYTES NFR BLD MANUAL: 2 %
NEUTROPHILS NFR BLD: 83 %
NRBC BLD-RTO: 0 /100 WBC
OVALOCYTES BLD QL SMEAR: ABNORMAL
PHOSPHATE SERPL-MCNC: 4.1 MG/DL
PLATELET # BLD AUTO: 98 K/UL
PLATELET BLD QL SMEAR: ABNORMAL
PMV BLD AUTO: 10.4 FL
POCT GLUCOSE: 178 MG/DL (ref 70–110)
POCT GLUCOSE: 228 MG/DL (ref 70–110)
POCT GLUCOSE: 234 MG/DL (ref 70–110)
POCT GLUCOSE: 242 MG/DL (ref 70–110)
POCT GLUCOSE: 272 MG/DL (ref 70–110)
POIKILOCYTOSIS BLD QL SMEAR: SLIGHT
POTASSIUM SERPL-SCNC: 3.8 MMOL/L
PROT SERPL-MCNC: 4.5 G/DL
PROTHROMBIN TIME: 11.2 SEC
RBC # BLD AUTO: 3.26 M/UL
SODIUM SERPL-SCNC: 134 MMOL/L
TACROLIMUS BLD-MCNC: 8.2 NG/ML
WBC # BLD AUTO: 3.73 K/UL

## 2018-11-06 PROCEDURE — 80053 COMPREHEN METABOLIC PANEL: CPT

## 2018-11-06 PROCEDURE — 84100 ASSAY OF PHOSPHORUS: CPT

## 2018-11-06 PROCEDURE — 25000003 PHARM REV CODE 250: Performed by: NURSE PRACTITIONER

## 2018-11-06 PROCEDURE — 99233 SBSQ HOSP IP/OBS HIGH 50: CPT | Mod: 24,,, | Performed by: NURSE PRACTITIONER

## 2018-11-06 PROCEDURE — 25000003 PHARM REV CODE 250: Performed by: STUDENT IN AN ORGANIZED HEALTH CARE EDUCATION/TRAINING PROGRAM

## 2018-11-06 PROCEDURE — 63600175 PHARM REV CODE 636 W HCPCS: Performed by: NURSE PRACTITIONER

## 2018-11-06 PROCEDURE — C9113 INJ PANTOPRAZOLE SODIUM, VIA: HCPCS | Performed by: PHYSICIAN ASSISTANT

## 2018-11-06 PROCEDURE — 99232 SBSQ HOSP IP/OBS MODERATE 35: CPT | Mod: ,,, | Performed by: INTERNAL MEDICINE

## 2018-11-06 PROCEDURE — 85027 COMPLETE CBC AUTOMATED: CPT

## 2018-11-06 PROCEDURE — 85007 BL SMEAR W/DIFF WBC COUNT: CPT

## 2018-11-06 PROCEDURE — 85610 PROTHROMBIN TIME: CPT

## 2018-11-06 PROCEDURE — 80197 ASSAY OF TACROLIMUS: CPT

## 2018-11-06 PROCEDURE — 63600175 PHARM REV CODE 636 W HCPCS: Performed by: PHYSICIAN ASSISTANT

## 2018-11-06 PROCEDURE — 25000003 PHARM REV CODE 250: Performed by: PHYSICIAN ASSISTANT

## 2018-11-06 PROCEDURE — 99232 SBSQ HOSP IP/OBS MODERATE 35: CPT | Mod: ,,, | Performed by: NURSE PRACTITIONER

## 2018-11-06 PROCEDURE — 20600001 HC STEP DOWN PRIVATE ROOM

## 2018-11-06 PROCEDURE — 83735 ASSAY OF MAGNESIUM: CPT

## 2018-11-06 PROCEDURE — 25000003 PHARM REV CODE 250: Performed by: TRANSPLANT SURGERY

## 2018-11-06 RX ORDER — MYCOPHENOLATE MOFETIL 250 MG/1
500 CAPSULE ORAL 2 TIMES DAILY
Status: DISCONTINUED | OUTPATIENT
Start: 2018-11-06 | End: 2018-11-23

## 2018-11-06 RX ORDER — HEPARIN SODIUM 5000 [USP'U]/ML
5000 INJECTION, SOLUTION INTRAVENOUS; SUBCUTANEOUS EVERY 8 HOURS
Status: DISCONTINUED | OUTPATIENT
Start: 2018-11-06 | End: 2018-11-15

## 2018-11-06 RX ADMIN — DOCUSATE SODIUM 100 MG: 100 CAPSULE, LIQUID FILLED ORAL at 09:11

## 2018-11-06 RX ADMIN — INSULIN ASPART 11 UNITS: 100 INJECTION, SOLUTION INTRAVENOUS; SUBCUTANEOUS at 08:11

## 2018-11-06 RX ADMIN — MIDODRINE HYDROCHLORIDE 15 MG: 5 TABLET ORAL at 09:11

## 2018-11-06 RX ADMIN — MIDODRINE HYDROCHLORIDE 15 MG: 5 TABLET ORAL at 08:11

## 2018-11-06 RX ADMIN — MIRTAZAPINE 7.5 MG: 7.5 TABLET ORAL at 08:11

## 2018-11-06 RX ADMIN — TACROLIMUS 1 MG: 1 CAPSULE ORAL at 05:11

## 2018-11-06 RX ADMIN — ATOVAQUONE 1500 MG: 750 SUSPENSION ORAL at 09:11

## 2018-11-06 RX ADMIN — INSULIN ASPART 2 UNITS: 100 INJECTION, SOLUTION INTRAVENOUS; SUBCUTANEOUS at 02:11

## 2018-11-06 RX ADMIN — MIDODRINE HYDROCHLORIDE 15 MG: 5 TABLET ORAL at 02:11

## 2018-11-06 RX ADMIN — INSULIN ASPART 2 UNITS: 100 INJECTION, SOLUTION INTRAVENOUS; SUBCUTANEOUS at 08:11

## 2018-11-06 RX ADMIN — URSODIOL 300 MG: 300 CAPSULE ORAL at 09:11

## 2018-11-06 RX ADMIN — MAGNESIUM OXIDE TAB 400 MG (241.3 MG ELEMENTAL MG) 800 MG: 400 (241.3 MG) TAB at 12:11

## 2018-11-06 RX ADMIN — URSODIOL 300 MG: 300 CAPSULE ORAL at 08:11

## 2018-11-06 RX ADMIN — PANTOPRAZOLE SODIUM 40 MG: 40 INJECTION, POWDER, FOR SOLUTION INTRAVENOUS at 08:11

## 2018-11-06 RX ADMIN — OXYCODONE HYDROCHLORIDE 5 MG: 5 TABLET ORAL at 03:11

## 2018-11-06 RX ADMIN — INSULIN ASPART 11 UNITS: 100 INJECTION, SOLUTION INTRAVENOUS; SUBCUTANEOUS at 09:11

## 2018-11-06 RX ADMIN — TACROLIMUS 1 MG: 1 CAPSULE ORAL at 08:11

## 2018-11-06 RX ADMIN — HEPARIN SODIUM 5000 UNITS: 5000 INJECTION, SOLUTION INTRAVENOUS; SUBCUTANEOUS at 03:11

## 2018-11-06 RX ADMIN — MAGNESIUM OXIDE TAB 400 MG (241.3 MG ELEMENTAL MG) 800 MG: 400 (241.3 MG) TAB at 08:11

## 2018-11-06 RX ADMIN — POLYETHYLENE GLYCOL 3350 17 G: 17 POWDER, FOR SOLUTION ORAL at 09:11

## 2018-11-06 RX ADMIN — INSULIN ASPART 11 UNITS: 100 INJECTION, SOLUTION INTRAVENOUS; SUBCUTANEOUS at 02:11

## 2018-11-06 RX ADMIN — PREDNISONE 20 MG: 10 TABLET ORAL at 09:11

## 2018-11-06 RX ADMIN — HEPARIN SODIUM 5000 UNITS: 5000 INJECTION, SOLUTION INTRAVENOUS; SUBCUTANEOUS at 08:11

## 2018-11-06 RX ADMIN — FLUCONAZOLE 200 MG: 200 TABLET ORAL at 09:11

## 2018-11-06 RX ADMIN — MYCOPHENOLATE MOFETIL 500 MG: 250 CAPSULE ORAL at 08:11

## 2018-11-06 NOTE — ASSESSMENT & PLAN NOTE
Post-LT EVA is multifactorial in origin and has been related to the severity of liver disease, toxicity of immunosuppressive therapy and hepatic ischaemia reperfusion injury might be a driving force in the aetiology of post-LT VEA.      - acute gradual worsening of renal function s/p OLT  -pt received 1 unit BCP 11/3, HH now stable, low HH likely contributing to EVA  Assessment: Acute Renal failure in setting of hepatic ischemia reperfusion injury and poor perfusion status vs acute tubular necrosis from immunosuppressive agents      Plan:  -No indication for CRRT today   -Patient azotemia likely secondary to her H/H dropping from GI bleed  - Primary team to assess for need for paracentesis today with abdominal US  - If not moving forward with or delaying paracentesis, can consider lasix 40 BID as Cr has been stable and pt would benefit from volume removal in setting of severe hypervolemia, pitting edema and abdominal distension  - stable azotemia  No uremia, BUN related to bleed

## 2018-11-06 NOTE — PROGRESS NOTES
The LTS rounding team discussed dc plans for patient today during rounds.   They discussed dc possible by the end of this week.  Inpatient PT and OT recommending that patient receive home health PT and OT at discharge.   Since patient's insurance co has worked with [facility] in past, TIMBO sent referral to Melissa with [facility] ([id number]) today.   TIMBO emailed patient's nurse  Diane Willett as well today with request for home health SN, PT and OT at discharge, as well as the orders and OHH information.   TIMBO remains available for all transplant resources, education and psychosocial support and will assist with all dc planning needs.

## 2018-11-06 NOTE — SUBJECTIVE & OBJECTIVE
Interval History: NAEON. Pt in moderate discomfort. Small BM today. Stable HH and Cr.     Review of patient's allergies indicates:  No Known Allergies  Current Facility-Administered Medications   Medication Frequency    atovaquone suspension 1,500 mg Daily    bisacodyl EC tablet 10 mg Daily PRN    bisacodyl suppository 10 mg Daily PRN    calcium carbonate 200 mg calcium (500 mg) chewable tablet 500 mg TID PRN    dextrose 50% injection 12.5 g PRN    dextrose 50% injection 25 g PRN    docusate sodium capsule 100 mg Daily    entecavir tablet 0.5 mg Q72H    fluconazole tablet 200 mg Daily    glucagon (human recombinant) injection 1 mg PRN    glucose chewable tablet 16 g PRN    glucose chewable tablet 24 g PRN    heparin (porcine) injection 5,000 Units Q8H    insulin aspart U-100 pen 0-5 Units QID (AC + HS) PRN    insulin aspart U-100 pen 11-13 Units TIDWM    magnesium oxide tablet 800 mg BID    midodrine tablet 15 mg TID    mirtazapine tablet 7.5 mg QHS    mycophenolate capsule 500 mg BID    ondansetron disintegrating tablet 8 mg Q8H PRN    ondansetron injection 4 mg Q8H PRN    oxyCODONE immediate release tablet 5 mg Q4H PRN    oxyCODONE immediate release tablet Tab 10 mg Q4H PRN    pantoprazole injection 40 mg BID    polyethylene glycol packet 17 g Daily PRN    polyethylene glycol packet 17 g Daily    predniSONE tablet 20 mg Daily    Followed by    [START ON 11/10/2018] predniSONE tablet 15 mg Daily    Followed by    [START ON 11/17/2018] predniSONE tablet 10 mg Daily    Followed by    [START ON 11/24/2018] predniSONE tablet 5 mg Daily    Followed by    [START ON 12/1/2018] predniSONE tablet 2.5 mg Daily    promethazine (PHENERGAN) 6.25 mg in dextrose 5 % 50 mL IVPB Q6H PRN    sodium chloride 0.9% flush 3 mL PRN    tacrolimus capsule 1 mg BID    ursodiol capsule 300 mg BID       Objective:     Vital Signs (Most Recent):  Temp: 97.8 °F (36.6 °C) (11/06/18 0800)  Pulse: 94 (11/06/18  1130)  Resp: 18 (11/06/18 1130)  BP: 132/75 (11/06/18 1130)  SpO2: 99 % (11/06/18 1130)  O2 Device (Oxygen Therapy): room air (11/06/18 1130) Vital Signs (24h Range):  Temp:  [97.8 °F (36.6 °C)-98.1 °F (36.7 °C)] 97.8 °F (36.6 °C)  Pulse:  [] 94  Resp:  [11-19] 18  SpO2:  [94 %-99 %] 99 %  BP: ()/(60-94) 132/75     Weight: 75 kg (165 lb 5.5 oz) (11/06/18 0545)  Body mass index is 29.29 kg/m².  Body surface area is 1.83 meters squared.    I/O last 3 completed shifts:  In: 580 [P.O.:480; I.V.:100]  Out: 975 [Urine:975]    Physical Exam   Constitutional: She is oriented to person, place, and time. Vital signs are normal. She appears toxic. She has a sickly appearance. She appears ill. She appears distressed.   HENT:   Head: Normocephalic and atraumatic.   Eyes: Scleral icterus is present.   Cardiovascular: Normal rate and regular rhythm.   Pulmonary/Chest: Effort normal. No respiratory distress.   Abdominal: She exhibits distension. Bowel sounds are decreased. There is generalized tenderness.   Musculoskeletal:   +3 BL LE pitting edema up to thighs    Neurological: She is alert and oriented to person, place, and time. GCS eye subscore is 4. GCS verbal subscore is 5. GCS motor subscore is 6.   Skin: Skin is warm. She is not diaphoretic.   Generalized jaundice    Nursing note and vitals reviewed.      Significant Labs:  CBC:   Recent Labs   Lab 11/06/18  0600   WBC 3.73*   RBC 3.26*   HGB 9.5*   HCT 29.6*   PLT 98*   MCV 91   MCH 29.1   MCHC 32.1     CMP:   Recent Labs   Lab 11/06/18  0600   *   CALCIUM 8.6*   ALBUMIN 3.0*   PROT 4.5*   *   K 3.8   CO2 22*      BUN 83*   CREATININE 2.0*   ALKPHOS 95   ALT 15   AST 21   BILITOT 1.8*     Coagulation:   Recent Labs   Lab 11/06/18  0600   INR 1.1     LFTs:   Recent Labs   Lab 11/06/18  0600   ALT 15   AST 21   ALKPHOS 95   BILITOT 1.8*   PROT 4.5*   ALBUMIN 3.0*     All labs within the past 24 hours have been reviewed.     Significant  Imaging:  X-Ray: Reviewed  Upper GI: Reviewed

## 2018-11-06 NOTE — PLAN OF CARE
Problem: Patient Care Overview  Goal: Plan of Care Review  Outcome: Ongoing (interventions implemented as appropriate)  No acute events overnight. VSS.   RLQ VALORIE site leaking copious amounts of serous drainage, +4 edema to surrounding areas.   EGD negative per GI.   Denies pain. Thigh high TEDs on.   Dressing to lower abdomen changed after pt shower.   Fall precautions maintained. Bed wheels locked, bed in lowest position, upper SR up x 2, call light in reach, non-skid socks when OOB. Instructed pt to call for assistance as needed. Will cont to monitor.

## 2018-11-06 NOTE — PROGRESS NOTES
[facility]  Liver Transplant  Progress Note    Patient Name: Scalrett Reddy  MRN: [id number]  Admission Date: 10/1/2018  Hospital Length of Stay: 35 days  Code Status: Full Code  Primary Care Provider: Primary Doctor No  Post-Operative Day: 32    ORGAN:   LIVER  Disease Etiology: Cirrhosis: Type B and D  Donor Type:    - Brain Death  CDC High Risk:   No  Donor CMV Status:   Donor CMV Status: Positive  Donor HBcAB:   Negative  Donor HCV Status:   Negative  Donor HBV SETH: Negative  Donor HCV SETH: Negative  Whole or Partial: Whole Liver  Biliary Anastomosis: End to End  Arterial Anatomy: Replaced Left Hepatic and Right Hepatic  Subjective:     History of Present Illness:  Ms. Reddy is a 69 y/o female with PMH of ESLD secondary Hep B and D.  Listed for liver transplant with MELD 23.  Paracentesis 10/1 with 5L removed, no fluid sent for analysis.  Morning labs significant for hyponatremia, Na 119.  Pt admitted to LTS for sodium monitoring.        Hospital Course:  Hospital Course: 69 y/o F h/o HBV/delta cirrhosis s/p DBDLT 10/5/18 (CMV D+/R+, steroid induction, MMF/tacro/pred maintenance) with take back on 10/6 2/2 hyperbilirubinema and bilious VALORIE drainage, no leak identified. Post-op course c/b hypercapneic and hypoxic respiratory failure and was reintubated though quickly extubated now doing well. Liver bx (10/6) sig for Zone 3 hemorrhage and collapse, in addition to cholestasis. Transferred from ICU on POD #4. ID consulted to comment on positive diphtheroid culture from ascitic fluid (likely skin colonization) as well as ESBL Klebsiella pneumoniae in urine culture (10/4).  Pt asymptomatic and cell count from ascitic fluid unremarkable. Per ID recs, no need to treat diphtheroids or asymptomatic ESBL Kleb pneumo bacteriuria though pt has had 6 days of therapy which would cover these organisms. Mild peritransplant ascites- repeat US 10/6 with increased arterial resistive indices. Repeat US 10/9  w/ continued elevation in RIs and fluid collections. LFTs trending down nicely.     Acute gradual worsening of renal function s/p OLTx.Creatinine on arrival 0.8 and has been up trending. Nephrology consulted for post-op EVA. Cr cont to trend up but remains to have good UOP. Had stable response to lasiz diuresis.  BUN elevated requiring multiple rounds of dialysis (10/15, 10/17, 10/20, 10/31). No significant improvements in kidney function noted.   In addition, pt H/H cont to fluctuate requiring multiple blood transfusions (10/14, 10/16, 10/18, 10/19, 10/22, 10/27, 10/29).  Pt reported dark stools 10/15 but color has normalized. FOBT+. EGD 10/18 consistent with ulcerated mucosa in duodenum s/p coagulation. Concern for H. Pylori. Started Amoxicillin/levofloxacin (10/19, complete 11/2)). Remains on Protonix 40 mg BID. Biopsy negative for infection and CMV. Will cont close monitoring H/H, transfuse as needed with goal Hb > 7.0.  Of note, Urine cx + Klebsiella pneumoniae (10/13). Received 3 day course of ciprofloxacin, dc'd 10/19.   On 10/19 pm pt c/o crushing chest pain. Troponin 0.094, likely 2/2 ischemic stress. EKG NSR.     Interval history: No acute events overnight. Pt doing well. Eating and sleeping well. EGD yesterday unremarkable. H&H stable. Continue protonix. Restart heparin sq. Cr 2 from 2.1. Nephrology following. Pt c/o abdominal distention. Liver US ordered to assess for ascites. Will plan for paracentesis pending US results. Valcyte remains on hold. Restart Cellcept bid. Wbc count improving. CMV  on 11/1. Repeat weekly, next due 11/8. Monitor.     Scheduled Meds:   [START ON 11/6/2018] atovaquone  1,500 mg Oral Daily    docusate sodium  100 mg Oral Daily    entecavir  0.5 mg Oral Q72H    fluconazole  200 mg Oral Daily    insulin aspart U-100  11-13 Units Subcutaneous TIDWM    magnesium oxide  800 mg Oral BID    midodrine  15 mg Oral TID    mirtazapine  7.5 mg Oral QHS    pantoprazole  40 mg  Intravenous BID    polyethylene glycol  17 g Oral Daily    [START ON 11/6/2018] predniSONE  20 mg Oral Daily    Followed by    [START ON 11/10/2018] predniSONE  15 mg Oral Daily    Followed by    [START ON 11/17/2018] predniSONE  10 mg Oral Daily    Followed by    [START ON 11/24/2018] predniSONE  5 mg Oral Daily    Followed by    [START ON 12/1/2018] predniSONE  2.5 mg Oral Daily    tacrolimus  1 mg Oral BID    ursodiol  300 mg Oral BID     Continuous Infusions:  PRN Meds:bisacodyl, bisacodyl, calcium carbonate, dextrose 50%, dextrose 50%, diphenhydrAMINE, fentaNYL, glucagon (human recombinant), glucose, glucose, HYDROmorphone, insulin aspart U-100, meperidine, ondansetron, ondansetron, ondansetron, oxyCODONE, oxyCODONE, polyethylene glycol, promethazine (PHENERGAN) IVPB, sodium chloride 0.9%    Review of Systems   Constitutional: Positive for activity change, appetite change (improving) and fatigue. Negative for chills and fever.   HENT: Negative for congestion and facial swelling.    Eyes: Negative for pain, discharge and visual disturbance.   Respiratory: Negative for cough, chest tightness, shortness of breath and wheezing.    Cardiovascular: Positive for leg swelling. Negative for chest pain and palpitations.   Gastrointestinal: Positive for abdominal distention and abdominal pain. Negative for blood in stool, constipation, diarrhea, nausea and vomiting.   Endocrine: Negative.    Genitourinary: Negative for decreased urine volume, difficulty urinating and dysuria.   Musculoskeletal: Negative for arthralgias and back pain.   Skin: Positive for wound.   Allergic/Immunologic: Positive for immunocompromised state.   Neurological: Positive for weakness. Negative for tremors and headaches.   Psychiatric/Behavioral: Negative for confusion and sleep disturbance.     Objective:     Vital Signs (Most Recent):  Temp: 97.3 °F (36.3 °C) (11/05/18 1312)  Pulse: 74 (11/05/18 1312)  Resp: 16 (11/05/18 1312)  BP:  122/73 (11/05/18 1312)  SpO2: 97 % (11/05/18 1312) Vital Signs (24h Range):  Temp:  [96.1 °F (35.6 °C)-97.9 °F (36.6 °C)] 97.3 °F (36.3 °C)  Pulse:  [65-89] 74  Resp:  [8-21] 16  SpO2:  [97 %-100 %] 97 %  BP: (104-140)/(54-87) 122/73     Weight: 73.1 kg (161 lb 2.5 oz)  Body mass index is 28.55 kg/m².    Intake/Output - Last 3 Shifts       11/03 0700 - 11/04 0659 11/04 0700 - 11/05 0659 11/05 0700 - 11/06 0659    P.O. 960 720 120    I.V. (mL/kg)   100 (1.4)    Blood  487.9     Total Intake(mL/kg) 960 (13) 1207.9 (16.5) 220 (3)    Urine (mL/kg/hr) 600 (0.3) 980 (0.6) 100 (0.2)    Emesis/NG output   0    Stool 0 0 0    Total Output 600 980 100    Net +360 +227.9 +120           Urine Occurrence 2 x  1 x    Stool Occurrence 1 x 1 x 1 x    Emesis Occurrence   1 x          Physical Exam   Constitutional: She is oriented to person, place, and time. She appears well-developed.   Temporal and distal extremity muscle wasting   HENT:   Head: Normocephalic and atraumatic.   Eyes: EOM are normal. Pupils are equal, round, and reactive to light. Scleral icterus is present.   Neck: Normal range of motion.   Cardiovascular: Normal rate and regular rhythm.   No murmur heard.  Pulmonary/Chest: Effort normal and breath sounds normal. No respiratory distress. She has no wheezes.   Abdominal: Soft. Bowel sounds are normal. She exhibits distension. There is tenderness. There is no guarding.   Chevron incision w/ staples, CDI  Leakage from previous RLQ VALORIE site   Musculoskeletal: Normal range of motion. She exhibits edema (3+ BLE).   Neurological: She is alert and oriented to person, place, and time.   Skin: Skin is warm and dry.   Nursing note and vitals reviewed.      Laboratory:  Immunosuppressants         Stop Route Frequency     tacrolimus capsule 1 mg      -- Oral 2 times daily        CBC:   Recent Labs   Lab 11/05/18  0530   WBC 2.50*   RBC 2.86*   HGB 8.4*   HCT 26.2*   PLT 90*   MCV 92   MCH 29.4   MCHC 32.1     CMP:   Recent Labs    Lab 11/05/18  0530   *   CALCIUM 8.8   ALBUMIN 3.7   PROT 5.2*   *   K 3.7   CO2 22*   CL 98   BUN 80*   CREATININE 2.1*   ALKPHOS 91   ALT 12   AST 14   BILITOT 1.8*     Labs within the past 24 hours have been reviewed.    Diagnostic Results:  I have personally reviewed all pertinent imaging studies.    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   - POD 1 US: increased arterial resistive indices, suggestive of edema vs cute rejection vs congestion.  - Repeat US 10/9 with continued elevation of RIs.  - LFTs stable.  - T bili trending down.  - repeat liver US (10/11): elevated RIs.  - Liver US 11/6 to assess for ascites.  - Now w/ Post LTx w/ EVA and possible slow oozing GIB.      Hyponatremia    - Na 119 on admit.  - Na was improving post transplant w HD.  - Na stable at this time.              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  -Tx w/ Entecavir- dose changed to q72 hours given renal function.         Ascites    - paracentesis 10/1, 5L removed, no fluid sent for analysis.  - pt remains volume overloaded.   - Liver US 11/6 to assess for ascites.  - Consider paracentesis tomorrow if ascites noted on US.        Severe malnutrition    - dietary consulted.   - temporal and distal extremity muscle wasting and edema.  - encourage supplements and to increase nutritional intake.  - per nephrology, pt to be on low protein diet.   - Prealbumin reviewed.      Hypokalemia           Type 2 diabetes mellitus with diabetic polyneuropathy, with long-term current use of insulin    - continue home regimen.  - endocrine consulted.  - Pt off insulin drip at this time.  Apprec endo recs.        Leukopenia    - Stopped Cellcept, Bactrim, and Valcyte 10/31--> will resume once WBC  improves and stable.   - Started Atovaquone 11/6.   - CMV PCR noted to be positive at 129. Will hold off on treatment with valcyte at this time. Repeat CMV 11/8.          Azotemia    - CRRT 10/31 for uremic toxin clearance.  - BUN trending back up. Monitor closely.          Duodenal ulcer    - EGD 10/18- sig for ulcerated duodenal mucosa.   - Biopsy 10/18- no infection detected.  - Received 14 day course of amoxicillin/levo (completed 11/2) for potential H. Pylori.   - Cont protonix 40 BID.  - EGD 11/5. Results negative per GI.          UTI (urinary tract infection)    - Urine Cx 10/15 w/ Klebsiella pneumonia.  - Started Ciprofloxacin (10/17-10/19).   - Started on amoxicillin/levo for H. Pylori (completed abx 11/2).  - Pt asymptomatic at this time.       Acute blood loss anemia    - H&H cont to fluctuate.   - FOBT +.  - EGD 10/18 - showed diffuse bleeding with ulcerated duodenal mucosa.  - EGD 11/5 - improvement seen, no active bleeding.  - 1 unit PRBCs transfused 10/27 and 11/3.   - H&H with good response.   - Resume heparin sq injections 11/6.            Post LT - Acute renal failure    - Creatinine on arrival 0.8 and trended up.  - Nephrology consulted.  - Renal US (10/11): no hydronephrosis.  - Echo 10/12: diastolic dysfunction, likely 2/2 to ARF.  - CRRT 10/14; SLED 10/17, 10/20. --> no improvements noted in kidney fxn.  - 24 hr urine creatine collection: CrCl of 7.   - CRRT 10/31 with 1L removed.   - Lasix 40 BID (restarted 11/1).  - Lasix now on hold as of 11/3 per nephrology recs.     Hypervolemia associated with renal insufficiency    - CRRT 10/14, 10/17, 10/20, 10/31.  - CXR 10/30: abundant fluid in b/l pleural space.  - Restarted Lasix 40 BID x 2 doses (11/2).  - Lasix now on hold per nephrology recs.        EVA (acute kidney injury)    - Cr 2 from 2.1. Monitor.        Metabolic acidosis    - CRRT 10/14.  - SLED 10/17, 10/20, 10/31.         At risk for opportunistic infections    - Valcyte for CMV  prophylaxis--> holding (10/31) for leukopenia. Pt will need weekly CMV PCR while discontinued. Next due, 11/8.  - Bactrim for PCP prophylaxis (MWF).--> holding (10/31) for leukopenia.   - Start Atovaquone 11/6.  - Fluconazole for fungal prophylaxis.         Long-term use of immunosuppressant medication    -  Prograf: stopped 10/29. Resumed 11/2.  --> Will monitor for signs of toxic side effects, check daily troughs, and change meds accordingly.  - Prednisone: stopped 10/29. Started on Hydrocortisone. pred taper resumed.  - Cellcept: stopped 10/31 for leukopenia. Restart Cellcept 500 mg bid, 11/6.         Prophylactic immunotherapy    - See long term use immunosuppresion.       Weakness    - see physical deconditioning.       Physical deconditioning    - PT/OT consulted.  - Recommend home health PT and rolling walker at discharge.       Hypotension    - continue Midodrine 15 mg TID. Monitor.       Anemia requiring transfusions    - Fluctuating H&H.    - Transfusions: 10/14; 10/16; 10/18; 10/19; 10/22; 10/27; 10/29, 10/31.  - FOBT +  - LDH elevated, Hapto < 10.  - EGD 10/18: ulcerated duodenal mucosa.   - Monitoring w/ CBC q12hr to assess the need for transfusion.  - H&H stable. No transfusion needed at this time.   - Consulted GI about this ongoing issue and further need of another EGD.  - EGD 11/5 unremarkable.              VTE Risk Mitigation (From admission, onward)        Ordered     heparin (porcine) injection 5,000 Units  Every 8 hours      11/06/18 1021     IP VTE HIGH RISK PATIENT  Once      11/05/18 1145     Place sequential compression device  Until discontinued      10/05/18 0709          Staples removed from chevron incision. Steri strips applied. Pt tolerated well. Sutures removed from old VALORIE sites. Sites c/d/i.     The patients clinical status was discussed at multidisplinary rounds, involving transplant surgery, transplant medicine, pharmacy, nursing, nutrition, and social work    Discharge Planning:  not a candidate for dc at this time.       Kimber Valle, KEEGAN  Liver Transplant  [facility]

## 2018-11-06 NOTE — ASSESSMENT & PLAN NOTE
Avoid insulin stacking and hypoglycemia.  Lab Results   Component Value Date    CREATININE 2.0 (H) 11/06/2018

## 2018-11-06 NOTE — ASSESSMENT & PLAN NOTE
Managed per primary team  Avoid hypoglycemia    Recent Labs   Lab 11/06/18  0600   ALT 15   AST 21   ALKPHOS 95   BILITOT 1.8*   PROT 4.5*   ALBUMIN 3.0*

## 2018-11-06 NOTE — PROGRESS NOTES
"[facility]  Endocrinology  Progress Note    Admit Date: 10/1/2018     Reason for Consult: Management of type 2 DM, Hyperglycemia      Surgical Procedure and Date: Liver transplant 10/5/18, Ex lap 10/6/18     Diabetes diagnosis year:      Home Diabetes Medications:  Lantus 18 units HS and novolog 17 units with meals plus correction scale (150-200 +1)        Lab Results   Component Value Date     HGBA1C 6.8 (H) 2018        How often checking glucose at home? 3-4   BG readings on regimen: 120-150.  Post prandial readings as high as upper 200s  Hypoglycemia on the regimen? yes, none recently   Missed doses on regimen?  No     Diabetes Complications include:     Diabetic peripheral neuropathy      Complicating diabetes co morbidities:   CIRRHOSIS        HPI:  Patient is a 68 y.o. female with a diagnosis of ESLD, listed for liver transplant with meld 23 who underwent live transplant on 10/5/18.  Back to OR on 10/6/18 for ex lap.  Admitted 10/1/18 for hyponatremia s/p paracentesis.  Personal has known diagnosis of diabetes, endocrine consulted for diabetes management.    Post-operative course complicated by ESBL Klebsiella pneumoniae in urine (in contact isolation), peritransplant ascites, worsening renal function (required temporary dialysis), anemia (multiple blood transfusions), and possible GI bleed.     Interval HPI:   Overnight events: Remains in TSU. BG elevated yesterday; required correction scale insulin in addition to scheduled novolog.  Prednisone 20 mg daily, standard steroid taper.  reatinine remains elevated, 2 today.  Eatin-75% - eating mostly foods from outside, size of meal varies  Nausea: No  Hypoglycemia and intervention: No  Fever: No  TPN and/or TF: No    /87   Pulse 99   Temp 97.8 °F (36.6 °C) (Oral)   Resp 14   Ht 5' 3" (1.6 m)   Wt 75 kg (165 lb 5.5 oz)   LMP  (LMP Unknown)   SpO2 96%   Breastfeeding? No   BMI 29.29 kg/m²      Labs Reviewed and " Include    Recent Labs   Lab 11/06/18  0600   *   CALCIUM 8.6*   ALBUMIN 3.0*   PROT 4.5*   *   K 3.8   CO2 22*      BUN 83*   CREATININE 2.0*   ALKPHOS 95   ALT 15   AST 21   BILITOT 1.8*     Lab Results   Component Value Date    WBC 3.73 (L) 11/06/2018    HGB 9.5 (L) 11/06/2018    HCT 29.6 (L) 11/06/2018    MCV 91 11/06/2018    PLT 98 (L) 11/06/2018     No results for input(s): TSH, FREET4 in the last 168 hours.  Lab Results   Component Value Date    HGBA1C 6.0 (H) 10/04/2018       Nutritional status:   Body mass index is 29.29 kg/m².  Lab Results   Component Value Date    ALBUMIN 3.0 (L) 11/06/2018    ALBUMIN 3.7 11/05/2018    ALBUMIN 2.4 (L) 11/04/2018     Lab Results   Component Value Date    PREALBUMIN 15 (L) 11/03/2018    PREALBUMIN 12 (L) 09/17/2018       Estimated Creatinine Clearance: 25.7 mL/min (A) (based on SCr of 2 mg/dL (H)).    Accu-Checks  Recent Labs     11/04/18  1726 11/04/18  1814 11/04/18  2144 11/05/18  0746 11/05/18  1111 11/05/18  1202 11/05/18  1314 11/05/18  1625 11/05/18  2037 11/06/18  0802   POCTGLUCOSE 289* 293* 272* 172* 202* 194* 208* 262* 247* 178*       Current Medications and/or Treatments Impacting Glycemic Control  Immunotherapy:    Immunosuppressants         Stop Route Frequency     mycophenolate capsule 500 mg      -- Oral 2 times daily     tacrolimus capsule 1 mg      -- Oral 2 times daily        Steroids:   Hormones (From admission, onward)    Start     Stop Route Frequency Ordered    12/01/18 0900  predniSONE tablet 2.5 mg      12/08 0859 Oral Daily 11/05/18 1004    11/24/18 0900  predniSONE tablet 5 mg      12/01 0859 Oral Daily 11/05/18 1004    11/17/18 0900  predniSONE tablet 10 mg      11/24 0859 Oral Daily 11/05/18 1004    11/10/18 0900  predniSONE tablet 15 mg      11/17 0859 Oral Daily 11/05/18 1004    11/06/18 0900  predniSONE tablet 20 mg      11/10 0859 Oral Daily 11/05/18 1004    10/17/18 0945  predniSONE tablet 15 mg      10/24 0859 Oral Daily  10/17/18 0943        Pressors:    Autonomic Drugs (From admission, onward)    Start     Stop Route Frequency Ordered    10/10/18 1500  midodrine tablet 15 mg      -- Oral 3 times daily 10/10/18 1122    10/06/18 1617  rocuronium (ZEMURON) 10 mg/mL injection     Comments:  Created by cabinet override    10/07 0429   10/06/18 1617        Hyperglycemia/Diabetes Medications:   Antihyperglycemics (From admission, onward)    Start     Stop Route Frequency Ordered    11/05/18 0745  insulin aspart U-100 pen 11-13 Units      -- SubQ 3 times daily with meals 11/05/18 0738    10/14/18 0925  insulin aspart U-100 pen 0-5 Units      -- SubQ Before meals & nightly PRN 10/14/18 0825    10/07/18 1200  insulin regular (Humulin R) 100 Units in sodium chloride 0.9% 100 mL infusion      10/13 2100 IV Continuous 10/07/18 1055          ASSESSMENT and PLAN    * Liver transplanted    Managed per primary team  Avoid hypoglycemia    Recent Labs   Lab 11/06/18  0600   ALT 15   AST 21   ALKPHOS 95   BILITOT 1.8*   PROT 4.5*   ALBUMIN 3.0*            Type 2 diabetes mellitus with diabetic polyneuropathy, with long-term current use of insulin    BG goal 140-180.     continue Novolog to 11-13 units with meals; will consider one set dose after discussing tomorrow.   Low dose correction scale given kidney function.  BG monitoring AC/HS    Discharge planning:  TBD     Corticosteroids adverse reaction    On steroid taper per transplant team; may elevate BG readings         EVA (acute kidney injury)    Avoid insulin stacking and hypoglycemia.  Lab Results   Component Value Date    CREATININE 2.0 (H) 11/06/2018        Prophylactic immunotherapy    May increase insulin resistance.        Severe malnutrition    Oral intake encouraged, may affect BG readings         Aviva Caldera NP  Endocrinology  [facility]

## 2018-11-06 NOTE — PROGRESS NOTES
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 Post-op course c/b hypercapneic and hypoxic respiratory failure and was reintubated though quickly extubated now doing well. Liver bx (10/6) sig for Zone 3 hemorrhage and collapse, in addition to cholestasis. Transferred from ICU on POD #4. ID consulted to comment on positive diphtheroid culture from ascitic fluid (likely skin colonization) as well as ESBL Klebsiella pneumoniae in urine culture (10/4).  Pt asymptomatic and cell count from ascitic fluid unremarkable. Per ID recs, no need to treat diphtheroids or asymptomatic ESBL Kleb pneumo bacteriuria though pt has had 6 days of therapy which would cover these organisms. Mild peritransplant ascites- repeat US 10/6 with increased arterial resistive indices. Repeat US 10/9 w/ continued elevation in RIs and fluid collections. LFTs trending down nicely.      Acute gradual worsening of renal function s/p OLTx.Creatinine on arrival 0.8 and has been up trending. Nephrology consulted for post-op EVA. Cr cont to trend up but remains to have good UOP. Had stable response to lasiz diuresis.  BUN elevated requiring multiple rounds of dialysis (10/15, 10/17, 10/20, 10/31). No significant improvements in kidney function noted.   In addition, pt H/H cont to fluctuate requiring multiple blood transfusions (10/14, 10/16, 10/18, 10/19, 10/22, 10/27, 10/29).  Pt reported dark stools 10/15 but color has normalized. FOBT+. EGD 10/18 consistent with ulcerated mucosa in duodenum s/p  coagulation. Concern for H. Pylori. Started Amoxicillin/levofloxacin (10/19, complete 11/2)). Remains on Protonix 40 mg BID. Biopsy negative for infection and CMV. Will cont close monitoring H/H, transfuse as needed with goal Hb > 7.0.  Of note, Urine cx + Klebsiella pneumoniae (10/13). Received 3 day course of ciprofloxacin, dc'd 10/19.   On 10/19 pm pt c/o crushing chest pain. Troponin 0.094, likely 2/2 ischemic stress. EKG NSR.      11/5/18 - no acute events overnight. Pt reports feeling well this am. One unit of PRBCs given 11/3 with good response. Cr level noted with only minimal increases. Lasix placed on hold per nephrology recs. Will plan to hydrate today w/ albumin. EGD 11/5 unimpressive for overt bleed. Pt remains on protonix 40 BID. Denies melena. Cont to hold heparin. WBC cont to decrease. Immunosuppression managed with hydrocortisone 50 (cont to hold Cellcept, bactrim, valcyte). Prograf now restarted and transitioned IV steroids to PO prednisone on 11/3. CMV PCR noted with 129. Cont to monitor.     Interval History: NAEON. Pt in moderate discomfort. Small BM today. Stable HH and Cr.     Review of patient's allergies indicates:  No Known Allergies  Current Facility-Administered Medications   Medication Frequency    atovaquone suspension 1,500 mg Daily    bisacodyl EC tablet 10 mg Daily PRN    bisacodyl suppository 10 mg Daily PRN    calcium carbonate 200 mg calcium (500 mg) chewable tablet 500 mg TID PRN    dextrose 50% injection 12.5 g PRN    dextrose 50% injection 25 g PRN    docusate sodium capsule 100 mg Daily    entecavir tablet 0.5 mg Q72H    fluconazole tablet 200 mg Daily    glucagon (human recombinant) injection 1 mg PRN    glucose chewable tablet 16 g PRN    glucose chewable tablet 24 g PRN    heparin (porcine) injection 5,000 Units Q8H    insulin aspart U-100 pen 0-5 Units QID (AC + HS) PRN    insulin aspart U-100 pen 11-13 Units TIDWM    magnesium oxide tablet 800 mg BID     midodrine tablet 15 mg TID    mirtazapine tablet 7.5 mg QHS    mycophenolate capsule 500 mg BID    ondansetron disintegrating tablet 8 mg Q8H PRN    ondansetron injection 4 mg Q8H PRN    oxyCODONE immediate release tablet 5 mg Q4H PRN    oxyCODONE immediate release tablet Tab 10 mg Q4H PRN    pantoprazole injection 40 mg BID    polyethylene glycol packet 17 g Daily PRN    polyethylene glycol packet 17 g Daily    predniSONE tablet 20 mg Daily    Followed by    [START ON 11/10/2018] predniSONE tablet 15 mg Daily    Followed by    [START ON 11/17/2018] predniSONE tablet 10 mg Daily    Followed by    [START ON 11/24/2018] predniSONE tablet 5 mg Daily    Followed by    [START ON 12/1/2018] predniSONE tablet 2.5 mg Daily    promethazine (PHENERGAN) 6.25 mg in dextrose 5 % 50 mL IVPB Q6H PRN    sodium chloride 0.9% flush 3 mL PRN    tacrolimus capsule 1 mg BID    ursodiol capsule 300 mg BID       Objective:     Vital Signs (Most Recent):  Temp: 97.8 °F (36.6 °C) (11/06/18 0800)  Pulse: 94 (11/06/18 1130)  Resp: 18 (11/06/18 1130)  BP: 132/75 (11/06/18 1130)  SpO2: 99 % (11/06/18 1130)  O2 Device (Oxygen Therapy): room air (11/06/18 1130) Vital Signs (24h Range):  Temp:  [97.8 °F (36.6 °C)-98.1 °F (36.7 °C)] 97.8 °F (36.6 °C)  Pulse:  [] 94  Resp:  [11-19] 18  SpO2:  [94 %-99 %] 99 %  BP: ()/(60-94) 132/75     Weight: 75 kg (165 lb 5.5 oz) (11/06/18 0545)  Body mass index is 29.29 kg/m².  Body surface area is 1.83 meters squared.    I/O last 3 completed shifts:  In: 580 [P.O.:480; I.V.:100]  Out: 975 [Urine:975]    Physical Exam   Constitutional: She is oriented to person, place, and time. Vital signs are normal. She appears toxic. She has a sickly appearance. She appears ill. She appears distressed.   HENT:   Head: Normocephalic and atraumatic.   Eyes: Scleral icterus is present.   Cardiovascular: Normal rate and regular rhythm.   Pulmonary/Chest: Effort normal. No respiratory distress.    Abdominal: She exhibits distension. Bowel sounds are decreased. There is generalized tenderness.   Musculoskeletal:   +3 BL LE pitting edema up to thighs    Neurological: She is alert and oriented to person, place, and time. GCS eye subscore is 4. GCS verbal subscore is 5. GCS motor subscore is 6.   Skin: Skin is warm. She is not diaphoretic.   Generalized jaundice    Nursing note and vitals reviewed.      Significant Labs:  CBC:   Recent Labs   Lab 11/06/18 0600   WBC 3.73*   RBC 3.26*   HGB 9.5*   HCT 29.6*   PLT 98*   MCV 91   MCH 29.1   MCHC 32.1     CMP:   Recent Labs   Lab 11/06/18  0600   *   CALCIUM 8.6*   ALBUMIN 3.0*   PROT 4.5*   *   K 3.8   CO2 22*      BUN 83*   CREATININE 2.0*   ALKPHOS 95   ALT 15   AST 21   BILITOT 1.8*     Coagulation:   Recent Labs   Lab 11/06/18  0600   INR 1.1     LFTs:   Recent Labs   Lab 11/06/18  0600   ALT 15   AST 21   ALKPHOS 95   BILITOT 1.8*   PROT 4.5*   ALBUMIN 3.0*     All labs within the past 24 hours have been reviewed.     Significant Imaging:  X-Ray: Reviewed  Upper GI: Reviewed    Assessment/Plan:      Post LT - Acute renal failure    Post-LT EVA is multifactorial in origin and has been related to the severity of liver disease, toxicity of immunosuppressive therapy and hepatic ischaemia reperfusion injury might be a driving force in the aetiology of post-LT EVA.      - acute gradual worsening of renal function s/p OLT  -pt received 1 unit BCP 11/3, HH now stable, low HH likely contributing to EVA  Assessment: Acute Renal failure in setting of hepatic ischemia reperfusion injury and poor perfusion status vs acute tubular necrosis from immunosuppressive agents      Plan:  -No indication for CRRT today   -Patient azotemia likely secondary to her H/H dropping from GI bleed  - Primary team to assess for need for paracentesis today with abdominal US  - If not moving forward with or delaying paracentesis, can consider lasix 40 BID as Cr has been  stable and pt would benefit from volume removal in setting of severe hypervolemia, pitting edema and abdominal distension  - stable azotemia  No uremia, BUN related to bleed                     Thank you for your consult.     Charles Benitez MD  Nephrology  [facility]    ATTENDING PHYSICIAN ATTESTATION  I have personally interviewed and examined the patient. I thoroughly reviewed the demographic, clinical, laboratorial and imaging information available in medical records. I agree with the assessment and recommendations provided by the subspecialty resident. Dr. Benitez was under my supervision.

## 2018-11-06 NOTE — PLAN OF CARE
Problem: Patient Care Overview  Goal: Plan of Care Review  Plan of care reviewed on am rounds, afrebrile, vss, wbc 3 anemia stable plts 98 LFTs stable. Cr  2 with last CRRT done 11/2, voiding small amounts urine, last BM Sun. Colace/ Miralax given scheduled  Accuchecks 100's-200's range with Endo following.Abdomen distended, liver u/s done with plans for paracentesis tomorrow. Appetite fair, states abdomen full and uncomfortable LE edema moderate, Teds hose on and legs elevated .. Staples removed from chevron incision and sutures removed from old drain sites. Prn Oxy requested after staple removal.  Up ambulatory with standby assist/ walker.. Attentive spouse at bs,  at bs for MD rounds. Also utilizes [facility] to assist with communication.  Emotional support provided throughout shift.

## 2018-11-07 LAB
ALBUMIN SERPL BCP-MCNC: 3 G/DL
ALP SERPL-CCNC: 118 U/L
ALT SERPL W/O P-5'-P-CCNC: 28 U/L
ANION GAP SERPL CALC-SCNC: 13 MMOL/L
APPEARANCE FLD: CLEAR
AST SERPL-CCNC: 30 U/L
BASOPHILS # BLD AUTO: 0.01 K/UL
BASOPHILS NFR BLD: 0.3 %
BILIRUB SERPL-MCNC: 1.9 MG/DL
BODY FLD TYPE: NORMAL
BUN SERPL-MCNC: 88 MG/DL
CALCIUM SERPL-MCNC: 8.5 MG/DL
CHLORIDE SERPL-SCNC: 99 MMOL/L
CO2 SERPL-SCNC: 22 MMOL/L
COLOR FLD: YELLOW
CREAT SERPL-MCNC: 2.3 MG/DL
DIFFERENTIAL METHOD: ABNORMAL
EOSINOPHIL # BLD AUTO: 0 K/UL
EOSINOPHIL NFR BLD: 0.3 %
ERYTHROCYTE [DISTWIDTH] IN BLOOD BY AUTOMATED COUNT: 17.6 %
EST. GFR  (AFRICAN AMERICAN): 24.3 ML/MIN/1.73 M^2
EST. GFR  (NON AFRICAN AMERICAN): 21.1 ML/MIN/1.73 M^2
FUNGUS SPEC CULT: NORMAL
FUNGUS SPEC CULT: NORMAL
GLUCOSE SERPL-MCNC: 89 MG/DL
GRAM STN SPEC: NORMAL
GRAM STN SPEC: NORMAL
HCT VFR BLD AUTO: 28.5 %
HGB BLD-MCNC: 9.5 G/DL
IMM GRANULOCYTES # BLD AUTO: 0.18 K/UL
IMM GRANULOCYTES NFR BLD AUTO: 4.7 %
INR PPP: 1
LYMPHOCYTES # BLD AUTO: 0.2 K/UL
LYMPHOCYTES NFR BLD: 5.7 %
LYMPHOCYTES NFR FLD MANUAL: 33 %
MAGNESIUM SERPL-MCNC: 2.2 MG/DL
MCH RBC QN AUTO: 30.2 PG
MCHC RBC AUTO-ENTMCNC: 33.3 G/DL
MCV RBC AUTO: 91 FL
MONOCYTES # BLD AUTO: 0.4 K/UL
MONOCYTES NFR BLD: 11.2 %
MONOS+MACROS NFR FLD MANUAL: 51 %
NEUTROPHILS # BLD AUTO: 3 K/UL
NEUTROPHILS NFR BLD: 77.8 %
NEUTROPHILS NFR FLD MANUAL: 16 %
NRBC BLD-RTO: 0 /100 WBC
PHOSPHATE SERPL-MCNC: 4.4 MG/DL
PLATELET # BLD AUTO: 100 K/UL
PMV BLD AUTO: 10.8 FL
POCT GLUCOSE: 121 MG/DL (ref 70–110)
POCT GLUCOSE: 138 MG/DL (ref 70–110)
POCT GLUCOSE: 143 MG/DL (ref 70–110)
POCT GLUCOSE: 54 MG/DL (ref 70–110)
POCT GLUCOSE: 58 MG/DL (ref 70–110)
POCT GLUCOSE: 64 MG/DL (ref 70–110)
POCT GLUCOSE: 83 MG/DL (ref 70–110)
POCT GLUCOSE: 99 MG/DL (ref 70–110)
POTASSIUM SERPL-SCNC: 4 MMOL/L
PROT SERPL-MCNC: 4.7 G/DL
PROTHROMBIN TIME: 10.7 SEC
RBC # BLD AUTO: 3.15 M/UL
SODIUM SERPL-SCNC: 134 MMOL/L
TACROLIMUS BLD-MCNC: 9.6 NG/ML
WBC # BLD AUTO: 3.84 K/UL
WBC # FLD: 39 /CU MM

## 2018-11-07 PROCEDURE — 63600175 PHARM REV CODE 636 W HCPCS: Performed by: NURSE PRACTITIONER

## 2018-11-07 PROCEDURE — 94761 N-INVAS EAR/PLS OXIMETRY MLT: CPT

## 2018-11-07 PROCEDURE — 20600001 HC STEP DOWN PRIVATE ROOM

## 2018-11-07 PROCEDURE — 85610 PROTHROMBIN TIME: CPT

## 2018-11-07 PROCEDURE — P9047 ALBUMIN (HUMAN), 25%, 50ML: HCPCS | Mod: JG | Performed by: TRANSPLANT SURGERY

## 2018-11-07 PROCEDURE — 25000003 PHARM REV CODE 250: Performed by: NURSE PRACTITIONER

## 2018-11-07 PROCEDURE — 87205 SMEAR GRAM STAIN: CPT

## 2018-11-07 PROCEDURE — 99900035 HC TECH TIME PER 15 MIN (STAT)

## 2018-11-07 PROCEDURE — 27000646 HC AEROBIKA DEVICE

## 2018-11-07 PROCEDURE — 25000003 PHARM REV CODE 250: Performed by: STUDENT IN AN ORGANIZED HEALTH CARE EDUCATION/TRAINING PROGRAM

## 2018-11-07 PROCEDURE — 25000003 PHARM REV CODE 250: Performed by: PHYSICIAN ASSISTANT

## 2018-11-07 PROCEDURE — 80197 ASSAY OF TACROLIMUS: CPT

## 2018-11-07 PROCEDURE — 89051 BODY FLUID CELL COUNT: CPT

## 2018-11-07 PROCEDURE — 84100 ASSAY OF PHOSPHORUS: CPT

## 2018-11-07 PROCEDURE — 99233 SBSQ HOSP IP/OBS HIGH 50: CPT | Mod: 24,,, | Performed by: NURSE PRACTITIONER

## 2018-11-07 PROCEDURE — 63600175 PHARM REV CODE 636 W HCPCS: Performed by: PHYSICIAN ASSISTANT

## 2018-11-07 PROCEDURE — 99232 SBSQ HOSP IP/OBS MODERATE 35: CPT | Mod: ,,, | Performed by: INTERNAL MEDICINE

## 2018-11-07 PROCEDURE — C9113 INJ PANTOPRAZOLE SODIUM, VIA: HCPCS | Performed by: PHYSICIAN ASSISTANT

## 2018-11-07 PROCEDURE — 87075 CULTR BACTERIA EXCEPT BLOOD: CPT

## 2018-11-07 PROCEDURE — 87070 CULTURE OTHR SPECIMN AEROBIC: CPT

## 2018-11-07 PROCEDURE — 94664 DEMO&/EVAL PT USE INHALER: CPT

## 2018-11-07 PROCEDURE — 83735 ASSAY OF MAGNESIUM: CPT

## 2018-11-07 PROCEDURE — 80053 COMPREHEN METABOLIC PANEL: CPT

## 2018-11-07 PROCEDURE — 25000003 PHARM REV CODE 250: Performed by: TRANSPLANT SURGERY

## 2018-11-07 PROCEDURE — 85025 COMPLETE CBC W/AUTO DIFF WBC: CPT

## 2018-11-07 PROCEDURE — 87102 FUNGUS ISOLATION CULTURE: CPT

## 2018-11-07 PROCEDURE — 63600175 PHARM REV CODE 636 W HCPCS: Mod: JG | Performed by: TRANSPLANT SURGERY

## 2018-11-07 PROCEDURE — 99231 SBSQ HOSP IP/OBS SF/LOW 25: CPT | Mod: ,,, | Performed by: NURSE PRACTITIONER

## 2018-11-07 RX ORDER — ALBUMIN HUMAN 250 G/1000ML
37.5 SOLUTION INTRAVENOUS ONCE
Status: COMPLETED | OUTPATIENT
Start: 2018-11-07 | End: 2018-11-07

## 2018-11-07 RX ORDER — INSULIN ASPART 100 [IU]/ML
6-8 INJECTION, SOLUTION INTRAVENOUS; SUBCUTANEOUS
Status: DISCONTINUED | OUTPATIENT
Start: 2018-11-08 | End: 2018-11-09

## 2018-11-07 RX ADMIN — MIRTAZAPINE 7.5 MG: 7.5 TABLET ORAL at 08:11

## 2018-11-07 RX ADMIN — DOCUSATE SODIUM 100 MG: 100 CAPSULE, LIQUID FILLED ORAL at 08:11

## 2018-11-07 RX ADMIN — PREDNISONE 20 MG: 10 TABLET ORAL at 08:11

## 2018-11-07 RX ADMIN — HEPARIN SODIUM 5000 UNITS: 5000 INJECTION, SOLUTION INTRAVENOUS; SUBCUTANEOUS at 08:11

## 2018-11-07 RX ADMIN — MIDODRINE HYDROCHLORIDE 15 MG: 5 TABLET ORAL at 04:11

## 2018-11-07 RX ADMIN — POLYETHYLENE GLYCOL 3350 17 G: 17 POWDER, FOR SOLUTION ORAL at 08:11

## 2018-11-07 RX ADMIN — HEPARIN SODIUM 5000 UNITS: 5000 INJECTION, SOLUTION INTRAVENOUS; SUBCUTANEOUS at 06:11

## 2018-11-07 RX ADMIN — MAGNESIUM OXIDE TAB 400 MG (241.3 MG ELEMENTAL MG) 800 MG: 400 (241.3 MG) TAB at 08:11

## 2018-11-07 RX ADMIN — INSULIN ASPART 13 UNITS: 100 INJECTION, SOLUTION INTRAVENOUS; SUBCUTANEOUS at 12:11

## 2018-11-07 RX ADMIN — URSODIOL 300 MG: 300 CAPSULE ORAL at 08:11

## 2018-11-07 RX ADMIN — TACROLIMUS 1 MG: 1 CAPSULE ORAL at 06:11

## 2018-11-07 RX ADMIN — MIDODRINE HYDROCHLORIDE 15 MG: 5 TABLET ORAL at 08:11

## 2018-11-07 RX ADMIN — PANTOPRAZOLE SODIUM 40 MG: 40 INJECTION, POWDER, FOR SOLUTION INTRAVENOUS at 08:11

## 2018-11-07 RX ADMIN — OXYCODONE HYDROCHLORIDE 5 MG: 5 TABLET ORAL at 04:11

## 2018-11-07 RX ADMIN — INSULIN ASPART 13 UNITS: 100 INJECTION, SOLUTION INTRAVENOUS; SUBCUTANEOUS at 08:11

## 2018-11-07 RX ADMIN — ALBUMIN (HUMAN) 37.5 G: 25 SOLUTION INTRAVENOUS at 03:11

## 2018-11-07 RX ADMIN — FLUCONAZOLE 200 MG: 200 TABLET ORAL at 08:11

## 2018-11-07 RX ADMIN — MYCOPHENOLATE MOFETIL 500 MG: 250 CAPSULE ORAL at 08:11

## 2018-11-07 RX ADMIN — TACROLIMUS 1 MG: 1 CAPSULE ORAL at 08:11

## 2018-11-07 RX ADMIN — ATOVAQUONE 1500 MG: 750 SUSPENSION ORAL at 08:11

## 2018-11-07 NOTE — PLAN OF CARE
Problem: Patient Care Overview  Goal: Plan of Care Review  Outcome: Ongoing (interventions implemented as appropriate)  Recommendation/Intervention:   1. Continue diet order as tolerated.  2. Encouraged good po intake as tolerated.  3. Dietitian Following    Goals:   1. Patient to meet >85% EEN/EPN.   2. Promote nutrition related labs WNL  Nutrition Goal Status: progressing towards goal    NFPE completed 10/3/18  Severe malnutrition     Nutrition Problem  Severe Malnutrition in the context of Chronic Illness/Injury     Related to (etiology):  ESLD     Signs and Symptoms (as evidenced by):  Energy Intake: <75% of estimated energy requirement for 2 months  Body Fat Depletion: Severe depletion of orbitals and triceps   Muscle Mass Depletion: Severe depletion of temples, clavicle region and lower extremities   Weight Loss: 12.31% x 1 month     Nutrition Diagnosis Status:  Continues

## 2018-11-07 NOTE — SUBJECTIVE & OBJECTIVE
Interval History: NAEON. NAD. states improved overall abdominal pain from yesterday. Pt to get paracentesis today.     Review of patient's allergies indicates:  No Known Allergies  Current Facility-Administered Medications   Medication Frequency    atovaquone suspension 1,500 mg Daily    bisacodyl EC tablet 10 mg Daily PRN    bisacodyl suppository 10 mg Daily PRN    calcium carbonate 200 mg calcium (500 mg) chewable tablet 500 mg TID PRN    dextrose 50% injection 12.5 g PRN    dextrose 50% injection 25 g PRN    docusate sodium capsule 100 mg Daily    entecavir tablet 0.5 mg Q72H    fluconazole tablet 200 mg Daily    glucagon (human recombinant) injection 1 mg PRN    glucose chewable tablet 16 g PRN    glucose chewable tablet 24 g PRN    heparin (porcine) injection 5,000 Units Q8H    insulin aspart U-100 pen 0-5 Units QID (AC + HS) PRN    insulin aspart U-100 pen 11-13 Units TIDWM    magnesium oxide tablet 800 mg BID    midodrine tablet 15 mg TID    mirtazapine tablet 7.5 mg QHS    mycophenolate capsule 500 mg BID    ondansetron disintegrating tablet 8 mg Q8H PRN    ondansetron injection 4 mg Q8H PRN    oxyCODONE immediate release tablet 5 mg Q4H PRN    oxyCODONE immediate release tablet Tab 10 mg Q4H PRN    pantoprazole injection 40 mg BID    polyethylene glycol packet 17 g Daily PRN    polyethylene glycol packet 17 g Daily    predniSONE tablet 20 mg Daily    Followed by    [START ON 11/10/2018] predniSONE tablet 15 mg Daily    Followed by    [START ON 11/17/2018] predniSONE tablet 10 mg Daily    Followed by    [START ON 11/24/2018] predniSONE tablet 5 mg Daily    Followed by    [START ON 12/1/2018] predniSONE tablet 2.5 mg Daily    promethazine (PHENERGAN) 6.25 mg in dextrose 5 % 50 mL IVPB Q6H PRN    sodium chloride 0.9% flush 3 mL PRN    tacrolimus capsule 1 mg BID    ursodiol capsule 300 mg BID       Objective:     Vital Signs (Most Recent):  Temp: 97.5 °F (36.4 °C) (11/07/18  1230)  Pulse: 93 (11/07/18 1230)  Resp: 19 (11/07/18 1230)  BP: (!) 153/94 (11/07/18 1230)  SpO2: 98 % (11/07/18 1230)  O2 Device (Oxygen Therapy): room air (11/07/18 0915) Vital Signs (24h Range):  Temp:  [97.5 °F (36.4 °C)-97.8 °F (36.6 °C)] 97.5 °F (36.4 °C)  Pulse:  [82-98] 93  Resp:  [9-20] 19  SpO2:  [94 %-100 %] 98 %  BP: (128-153)/(76-94) 153/94     Weight: 75.5 kg (166 lb 7.2 oz) (11/07/18 0500)  Body mass index is 29.48 kg/m².  Body surface area is 1.83 meters squared.    I/O last 3 completed shifts:  In: 690 [P.O.:690]  Out: 350 [Urine:350]    Physical Exam   Constitutional: She is oriented to person, place, and time. She is cooperative. She has a sickly appearance. No distress.   HENT:   Head: Normocephalic and atraumatic.   Cardiovascular: Normal rate and regular rhythm.   Pulmonary/Chest: Effort normal.   Abdominal: She exhibits distension and ascites.   Musculoskeletal: She exhibits edema. She exhibits no tenderness.   BL LE pitting edema, NTTP   Neurological: She is alert and oriented to person, place, and time. GCS eye subscore is 4. GCS verbal subscore is 5. GCS motor subscore is 6.   Skin: Skin is warm. She is not diaphoretic.   Psychiatric: She has a normal mood and affect. Her behavior is normal.   Nursing note and vitals reviewed.      Significant Labs:  CBC:   Recent Labs   Lab 11/07/18  0500   WBC 3.84*   RBC 3.15*   HGB 9.5*   HCT 28.5*   *   MCV 91   MCH 30.2   MCHC 33.3     CMP:   Recent Labs   Lab 11/07/18  0500   GLU 89   CALCIUM 8.5*   ALBUMIN 3.0*   PROT 4.7*   *   K 4.0   CO2 22*   CL 99   BUN 88*   CREATININE 2.3*   ALKPHOS 118   ALT 28   AST 30   BILITOT 1.9*     Coagulation:   Recent Labs   Lab 11/07/18  0500   INR 1.0     LFTs:   Recent Labs   Lab 11/07/18  0500   ALT 28   AST 30   ALKPHOS 118   BILITOT 1.9*   PROT 4.7*   ALBUMIN 3.0*     All labs within the past 24 hours have been reviewed.     Significant Imaging:  reviewed

## 2018-11-07 NOTE — PT/OT/SLP PROGRESS
Physical Therapy      Patient Name:  Scarlett Reddy   MRN:  [id number]    Patient not seen today secondary to (Pt off floor at IR.). Will follow-up at next scheduled session as able.    Radha Nguyen, PT, DPT   11/7/2018  [phone]

## 2018-11-07 NOTE — PROGRESS NOTES
" [facility]  Adult Nutrition  Progress Note     SUMMARY       Recommendations  Recommendation/Intervention:   1. Continue diet order as tolerated.  2. Encouraged good po intake as tolerated.  3. Dietitian Following    Goals:   1. Patient to meet >85% EEN/EPN.   2. Promote nutrition related labs WNL  Nutrition Goal Status: progressing towards goal  Communication of RD Recs: (POC)    General Information Comments: Reports she feels full quickly due to feeling bloated. Noted weight increase of 3.75% since last visit likely related to fluid. Abdomen distended. Scheduled for paracentesis today. NFPE completed 10/3.  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, DM  Interdisciplinary Rounds: attended    Nutrition Risk Screen  Nutrition Risk Screen: cultural or Sikhism food preferences    Nutrition/Diet History  Patient Reported Diet/Restrictions/Preferences: diabetic diet, low salt(Cultural preferences)  Food Preferences: Cultural preferences Noted  Do you have any cultural, spiritual, Sikhism conflicts, given your current situation?: none noted   Supplemental Drinks or Food Habits: Boost Plus, Ensure Plus(Strawberry)  Food Allergies: NKFA  Factors Affecting Nutritional Intake: abdominal distention    Anthropometrics  Temp: 97.8 °F (36.6 °C)  Height Method: Stated  Height: 5' 3" (160 cm)  Height (inches): 63 in  Weight Method: Bed Scale  Weight: 75.5 kg (166 lb 7.2 oz)  Weight (lb): 166.45 lb  Ideal Body Weight (IBW), Female: 115 lb  % Ideal Body Weight, Female (lb): 101.8 lb  BMI (Calculated): 20.8  BMI Grade: 18.5-24.9 - normal    Lab/Procedures/Meds  Labs: Reviewed    (L) 11/07/2018    K 4.0 11/07/2018    BUN 88 (H) 11/07/2018    CREATININE 2.3 (H) 11/07/2018    CALCIUM 8.5 (L) " 11/07/2018    PHOS 4.4 11/07/2018    MG 2.2 11/07/2018    EGFRNONAA 21.1 (A) 11/07/2018    ALBUMIN 3.0 (L) 11/07/2018      ALT 28 11/07/2018    AST 30 11/07/2018    ALKPHOS 118 11/07/2018     Meds: Reviewed  Scheduled Meds:   atovaquone  1,500 mg Oral Daily    docusate sodium  100 mg Oral Daily    entecavir  0.5 mg Oral Q72H    fluconazole  200 mg Oral Daily    heparin (porcine)  5,000 Units Subcutaneous Q8H    insulin aspart U-100  11-13 Units Subcutaneous TIDWM    magnesium oxide  800 mg Oral BID    midodrine  15 mg Oral TID    mirtazapine  7.5 mg Oral QHS    mycophenolate  500 mg Oral BID    pantoprazole  40 mg Intravenous BID    polyethylene glycol  17 g Oral Daily    predniSONE  20 mg Oral Daily     Physical Findings/Assessment  Overall Physical Appearance: loss of subcutaneous fat, loss of muscle mass  Oral/Mouth Cavity: tooth/teeth missing  Skin: intact, edema, jaundice    Estimated/Assessed Needs  Weight Used For Calorie Calculations: 72.2 kg (159 lb 2.8 oz)  Energy Calorie Requirements (kcal): 0834-9347  Energy Need Method: Kcal/kg(25-30 kcal/kg)  Protein Requirements: (1.2-1.4 gm/kg)  Weight Used For Protein Calculations: 72.2 kg (159 lb 2.8 oz)  Fluid Requirements (mL): 1 mL/kcal or per MD  Fluid Need Method: RDA Method  RDA Method (mL): 1805     Nutrition Prescription Ordered  Current Diet Order: Regular, Low protein   Nutrition Order Comments: Fluid - 1500mL  Oral Nutrition Supplement: Boost Glucose Control    Evaluation of Received Nutrient/Fluid Intake in last 24h  I/O: Noted  Comments: LBM 11/6/18  % Intake of Estimated Energy Needs: 75 - 100 %  % Meal Intake: 50 - 75 %    Nutrition Risk  Level of Risk/Frequency of Follow-up: low(1x week)     Assessment and Plan  NFPE completed 10/3/18  Severe malnutrition     Nutrition Problem  Severe Malnutrition in the context of Chronic Illness/Injury     Related to (etiology):  ESLD     Signs and Symptoms (as evidenced by):  Energy Intake:  <75% of estimated energy requirement for 2 months  Body Fat Depletion: Severe depletion of orbitals and triceps   Muscle Mass Depletion: Severe depletion of temples, clavicle region and lower extremities   Weight Loss: 12.31% x 1 month     Nutrition Diagnosis Status:  Continues     Monitor and Evaluation  Food and Nutrient Intake: energy intake, food and beverage intake  Food and Nutrient Adminstration: diet order  Knowledge/Beliefs/Attitudes: food and nutrition knowledge/skill  Physical Activity and Function: nutrition-related ADLs and IADLs  Anthropometric Measurements: weight change, weight  Biochemical Data, Medical Tests and Procedures: electrolyte and renal panel, gastrointestinal profile, glucose/endocrine profile, inflammatory profile, lipid profile  Nutrition-Focused Physical Findings: overall appearance     Nutrition Follow-Up  RD Follow-up?: Yes

## 2018-11-07 NOTE — PROGRESS NOTES
SW met with pt and pts  Andrés at bedside with Tajik  Dunia to assess for coping and discuss needed resources for future discharge.   Pt presents alert and oriented x 4, good eye contact, asking and answering questions today.    Pt reports to SW that her dgtr Gabriela returns to [facility] tomorrow and it will just be the patient and pts  locally.   Per the  the patient is working with a [facility] member to see if she will work out for patient to hire once she is dc from the hospital.  This person would assist the patient at her local lodging at [facility].       Pt reports she is disappointed that she is not feeling very well right now, but she is optimistic that she will starte to feel better.  Pt going for paracentesis later today. Pt denies any symptoms of depression or anxiety and that she is coping appropriately with her blaine and family.    SW discussed with patient that inpatient physical therapy is recommending home health PT at pts discharge and that [facility] is working with pts insurance and [facility] regarding referral, so all arrangements in place when pt is ready for dc from the hospital.   Pt acknowledges this information and denies any psychosocial concerns at this time.   Pts  had taken a phone call during SW visit today.    remains available for all transplant resources, education and psychosocial support.

## 2018-11-07 NOTE — PROGRESS NOTES
[facility]  Liver Transplant  Progress Note    Patient Name: Scarlett Reddy  MRN: [id number]  Admission Date: 10/1/2018  Hospital Length of Stay: 36 days  Code Status: Full Code  Primary Care Provider: Primary Doctor No  Post-Operative Day: 33    ORGAN:   LIVER  Disease Etiology: Cirrhosis: Type B and D  Donor Type:    - Brain Death  CDC High Risk:   No  Donor CMV Status:   Donor CMV Status: Positive  Donor HBcAB:   Negative  Donor HCV Status:   Negative  Donor HBV SETH: Negative  Donor HCV SETH: Negative  Whole or Partial: Whole Liver  Biliary Anastomosis: End to End  Arterial Anatomy: Replaced Left Hepatic and Right Hepatic  Subjective:     History of Present Illness:  Ms. Reddy is a 67 y/o female with PMH of ESLD secondary Hep B and D.  Listed for liver transplant with MELD 23.  Paracentesis 10/1 with 5L removed, no fluid sent for analysis.  Morning labs significant for hyponatremia, Na 119.  Pt admitted to LTS for sodium monitoring.        Hospital Course:  Hospital Course: 67 y/o F h/o HBV/delta cirrhosis s/p DBDLT 10/5/18 (CMV D+/R+, steroid induction, MMF/tacro/pred maintenance) with take back on 10/6 2/2 hyperbilirubinema and bilious VALORIE drainage, no leak identified. Post-op course c/b hypercapneic and hypoxic respiratory failure and was reintubated though quickly extubated now doing well. Liver bx (10/6) sig for Zone 3 hemorrhage and collapse, in addition to cholestasis. Transferred from ICU on POD #4. ID consulted to comment on positive diphtheroid culture from ascitic fluid (likely skin colonization) as well as ESBL Klebsiella pneumoniae in urine culture (10/4).  Pt asymptomatic and cell count from ascitic fluid unremarkable. Per ID recs, no need to treat diphtheroids or asymptomatic ESBL Kleb pneumo bacteriuria though pt has had 6 days of therapy which would cover these organisms. Mild peritransplant ascites- repeat US 10/6 with increased arterial resistive indices. Repeat US 10/9  w/ continued elevation in RIs and fluid collections. LFTs trending down nicely.     Acute gradual worsening of renal function s/p OLTx.Creatinine on arrival 0.8 and has been up trending. Nephrology consulted for post-op EVA. Cr cont to trend up but remains to have good UOP. Had stable response to lasiz diuresis.  BUN elevated requiring multiple rounds of dialysis (10/15, 10/17, 10/20, 10/31). No significant improvements in kidney function noted.   In addition, pt H/H cont to fluctuate requiring multiple blood transfusions (10/14, 10/16, 10/18, 10/19, 10/22, 10/27, 10/29).  Pt reported dark stools 10/15 but color has normalized. FOBT+. EGD 10/18 consistent with ulcerated mucosa in duodenum s/p coagulation. Concern for H. Pylori. Started Amoxicillin/levofloxacin (10/19, complete 11/2)). Remains on Protonix 40 mg BID. Biopsy negative for infection and CMV. Will cont close monitoring H/H, transfuse as needed with goal Hb > 7.0.  Of note, Urine cx + Klebsiella pneumoniae (10/13). Received 3 day course of ciprofloxacin, dc'd 10/19.   On 10/19 pm pt c/o crushing chest pain. Troponin 0.094, likely 2/2 ischemic stress. EKG NSR.     Interval history: No acute events overnight. Liver US 11/6 showed no arterial flow and severe ascites. LFTs stable. Will hold off on CTA at this time given normal liver function and EVA. Paracentesis scheduled today. Cr 2.3 from 2. Wbc count remains decreased but stable. Cellcept restarted 11/6. Valcyte on hold. CMV  on 11/1. Repeat weekly, next due 11/8. Monitor.    Scheduled Meds:   [START ON 11/6/2018] atovaquone  1,500 mg Oral Daily    docusate sodium  100 mg Oral Daily    entecavir  0.5 mg Oral Q72H    fluconazole  200 mg Oral Daily    insulin aspart U-100  11-13 Units Subcutaneous TIDWM    magnesium oxide  800 mg Oral BID    midodrine  15 mg Oral TID    mirtazapine  7.5 mg Oral QHS    pantoprazole  40 mg Intravenous BID    polyethylene glycol  17 g Oral Daily    [START ON  11/6/2018] predniSONE  20 mg Oral Daily    Followed by    [START ON 11/10/2018] predniSONE  15 mg Oral Daily    Followed by    [START ON 11/17/2018] predniSONE  10 mg Oral Daily    Followed by    [START ON 11/24/2018] predniSONE  5 mg Oral Daily    Followed by    [START ON 12/1/2018] predniSONE  2.5 mg Oral Daily    tacrolimus  1 mg Oral BID    ursodiol  300 mg Oral BID     Continuous Infusions:  PRN Meds:bisacodyl, bisacodyl, calcium carbonate, dextrose 50%, dextrose 50%, diphenhydrAMINE, fentaNYL, glucagon (human recombinant), glucose, glucose, HYDROmorphone, insulin aspart U-100, meperidine, ondansetron, ondansetron, ondansetron, oxyCODONE, oxyCODONE, polyethylene glycol, promethazine (PHENERGAN) IVPB, sodium chloride 0.9%    Review of Systems   Constitutional: Positive for activity change, appetite change (improving) and fatigue. Negative for chills and fever.   HENT: Negative for congestion and facial swelling.    Eyes: Negative for pain, discharge and visual disturbance.   Respiratory: Negative for cough, chest tightness, shortness of breath and wheezing.    Cardiovascular: Positive for leg swelling. Negative for chest pain and palpitations.   Gastrointestinal: Positive for abdominal distention and abdominal pain. Negative for blood in stool, constipation, diarrhea, nausea and vomiting.   Endocrine: Negative.    Genitourinary: Negative for decreased urine volume, difficulty urinating and dysuria.   Musculoskeletal: Negative for arthralgias and back pain.   Skin: Positive for wound.   Allergic/Immunologic: Positive for immunocompromised state.   Neurological: Positive for weakness. Negative for tremors and headaches.   Psychiatric/Behavioral: Negative for confusion and sleep disturbance.     Objective:     Vital Signs (Most Recent):  Temp: 97.3 °F (36.3 °C) (11/05/18 1312)  Pulse: 74 (11/05/18 1312)  Resp: 16 (11/05/18 1312)  BP: 122/73 (11/05/18 1312)  SpO2: 97 % (11/05/18 1312) Vital Signs (24h  Range):  Temp:  [96.1 °F (35.6 °C)-97.9 °F (36.6 °C)] 97.3 °F (36.3 °C)  Pulse:  [65-89] 74  Resp:  [8-21] 16  SpO2:  [97 %-100 %] 97 %  BP: (104-140)/(54-87) 122/73     Weight: 73.1 kg (161 lb 2.5 oz)  Body mass index is 28.55 kg/m².    Intake/Output - Last 3 Shifts       11/03 0700 - 11/04 0659 11/04 0700 - 11/05 0659 11/05 0700 - 11/06 0659    P.O. 960 720 120    I.V. (mL/kg)   100 (1.4)    Blood  487.9     Total Intake(mL/kg) 960 (13) 1207.9 (16.5) 220 (3)    Urine (mL/kg/hr) 600 (0.3) 980 (0.6) 100 (0.2)    Emesis/NG output   0    Stool 0 0 0    Total Output 600 980 100    Net +360 +227.9 +120           Urine Occurrence 2 x  1 x    Stool Occurrence 1 x 1 x 1 x    Emesis Occurrence   1 x          Physical Exam   Constitutional: She is oriented to person, place, and time. She appears well-developed.   Temporal and distal extremity muscle wasting   HENT:   Head: Normocephalic and atraumatic.   Eyes: EOM are normal. Pupils are equal, round, and reactive to light. Scleral icterus is present.   Neck: Normal range of motion.   Cardiovascular: Normal rate and regular rhythm.   No murmur heard.  Pulmonary/Chest: Effort normal and breath sounds normal. No respiratory distress. She has no wheezes.   Abdominal: Soft. Bowel sounds are normal. She exhibits distension. There is tenderness. There is no guarding.   Chevron incision w/ staples, CDI  Leakage from previous RLQ VALORIE site   Musculoskeletal: Normal range of motion. She exhibits edema (3+ BLE).   Neurological: She is alert and oriented to person, place, and time.   Skin: Skin is warm and dry.   Nursing note and vitals reviewed.      Laboratory:  Immunosuppressants         Stop Route Frequency     tacrolimus capsule 1 mg      -- Oral 2 times daily        CBC:   Recent Labs   Lab 11/05/18  0530   WBC 2.50*   RBC 2.86*   HGB 8.4*   HCT 26.2*   PLT 90*   MCV 92   MCH 29.4   MCHC 32.1     CMP:   Recent Labs   Lab 11/05/18  0530   *   CALCIUM 8.8   ALBUMIN 3.7   PROT  5.2*   *   K 3.7   CO2 22*   CL 98   BUN 80*   CREATININE 2.1*   ALKPHOS 91   ALT 12   AST 14   BILITOT 1.8*     Labs within the past 24 hours have been reviewed.    Diagnostic Results:  I have personally reviewed all pertinent imaging studies.    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   - POD 1 US: increased arterial resistive indices, suggestive of edema vs cute rejection vs congestion.  - Repeat US 10/9 with continued elevation of RIs.  - LFTs stable.  - T bili trending down.  - repeat liver US (10/11): elevated RIs.  - Liver US 11/6 to assess for ascites. No arterial flow within the liver allograft concerning for interval thrombosis/occlusion of the arterial system. Severe ascites seen.  - LFT remain stable. No CTA at this time due to EVA and normal liver function. Continue to monitor.      Hyponatremia    - Na 119 on admit.  - Na was improving post transplant w HD.  - Na stable at this time.              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  -Tx w/ Entecavir- dose changed to q72 hours given renal function.         Ascites    - paracentesis 10/1, 5L removed, no fluid sent for analysis.  - pt remains volume overloaded.   - Liver US 11/6 with severe ascites.   - Paracentesis scheduled today.        Severe malnutrition    - dietary consulted.   - temporal and distal extremity muscle wasting and edema.  - encourage supplements and to increase nutritional intake.  - per nephrology, pt to be on low protein diet.   - Prealbumin reviewed.      Hypokalemia           Type 2 diabetes mellitus with diabetic polyneuropathy, with long-term current use of insulin    - continue home regimen.  - endocrine consulted.  - Pt off insulin drip at this time.  Apprec endo recs.         Leukopenia    - Stopped Cellcept, Bactrim, and Valcyte 10/31--> will resume once WBC improves and stable.   - Started Atovaquone 11/6.   - CMV PCR noted to be positive at 129. Will hold off on treatment with valcyte at this time. Repeat CMV 11/8.          Azotemia    - CRRT 10/31 for uremic toxin clearance.  - BUN trending back up. Monitor closely.          Duodenal ulcer    - EGD 10/18- sig for ulcerated duodenal mucosa.   - Biopsy 10/18- no infection detected.  - Received 14 day course of amoxicillin/levo (completed 11/2) for potential H. Pylori.   - Cont protonix 40 BID.  - EGD 11/5. Results negative per GI.          UTI (urinary tract infection)    - Urine Cx 10/15 w/ Klebsiella pneumonia.  - Started Ciprofloxacin (10/17-10/19).   - Started on amoxicillin/levo for H. Pylori (completed abx 11/2).  - Pt asymptomatic at this time.       Acute blood loss anemia    - H&H cont to fluctuate.   - FOBT +.  - EGD 10/18 - showed diffuse bleeding with ulcerated duodenal mucosa.  - EGD 11/5 - improvement seen, no active bleeding.  - 1 unit PRBCs transfused 10/27 and 11/3.   - H&H with good response.   - Resume heparin sq injections 11/6.            Post LT - Acute renal failure    - Creatinine on arrival 0.8 and trended up.  - Nephrology consulted.  - Renal US (10/11): no hydronephrosis.  - Echo 10/12: diastolic dysfunction, likely 2/2 to ARF.  - CRRT 10/14; SLED 10/17, 10/20. --> no improvements noted in kidney fxn.  - 24 hr urine creatine collection: CrCl of 7.   - CRRT 10/31 with 1L removed.   - Lasix 40 BID (restarted 11/1).  - Lasix now on hold as of 11/3 per nephrology recs.     Hypervolemia associated with renal insufficiency    - CRRT 10/14, 10/17, 10/20, 10/31.  - CXR 10/30: abundant fluid in b/l pleural space.  - Restarted Lasix 40 BID x 2 doses (11/2).  - Lasix now on hold per nephrology recs.        EVA (acute kidney injury)    - Cr 2.3 from 2.  - Albumin per IR for para today.        Metabolic acidosis    -  CRRT 10/14.  - SLED 10/17, 10/20, 10/31.         At risk for opportunistic infections    - Valcyte for CMV prophylaxis--> holding (10/31) for leukopenia. Pt will need weekly CMV PCR while discontinued. Next due, 11/8.  - Bactrim for PCP prophylaxis (MWF).--> holding (10/31) for leukopenia.   - Start Atovaquone 11/6.  - Fluconazole for fungal prophylaxis.         Long-term use of immunosuppressant medication    -  Prograf: stopped 10/29. Resumed 11/2.  --> Will monitor for signs of toxic side effects, check daily troughs, and change meds accordingly.  - Prednisone: stopped 10/29. Started on Hydrocortisone. pred taper resumed.  - Cellcept: stopped 10/31 for leukopenia. Restart Cellcept 500 mg bid, 11/6.         Prophylactic immunotherapy    - See long term use immunosuppresion.       Weakness    - see physical deconditioning.       Physical deconditioning    - PT/OT consulted.  - Recommend home health PT and rolling walker at discharge.       Hypotension    - continue Midodrine 15 mg TID. Monitor.       Anemia requiring transfusions    - Fluctuating H&H.    - Transfusions: 10/14; 10/16; 10/18; 10/19; 10/22; 10/27; 10/29, 10/31.  - FOBT +  - LDH elevated, Hapto < 10.  - EGD 10/18: ulcerated duodenal mucosa.   - Monitoring w/ CBC q12hr to assess the need for transfusion.  - H&H stable. No transfusion needed at this time.   - Consulted GI about this ongoing issue and further need of another EGD.  - EGD 11/5 unremarkable.              VTE Risk Mitigation (From admission, onward)        Ordered     heparin (porcine) injection 5,000 Units  Every 8 hours      11/06/18 1021     IP VTE HIGH RISK PATIENT  Once      11/05/18 1145     Place sequential compression device  Until discontinued      10/05/18 0709          The patients clinical status was discussed at multidisplinary rounds, involving transplant surgery, transplant medicine, pharmacy, nursing, nutrition, and social work    Discharge Planning: not a candidate for dc at  this time.       Kimber Valle NP  Liver Transplant  [facility]

## 2018-11-07 NOTE — ASSESSMENT & PLAN NOTE
BG goal 140-180.     continue Novolog to 11-13 units with meals; appetite remains variable each day depending on how she is feeling  Low dose correction scale given kidney function.  BG monitoring AC/HS    Discharge planning:  [place]

## 2018-11-07 NOTE — PROCEDURES
Radiology Post-Procedure Note    Pre Op Diagnosis: Ascites  Post Op Diagnosis: Same    Procedure: Ultrasound Guided Paracentesis    Procedure performed by: ROSE Mccarty DNP  Written Informed Consent Obtained: Yes  Specimen Removed: YES cloudy yellow  Estimated Blood Loss: Minimal    Findings:   Successful paracentesis.  Albumin administered PRN per protocol.    Patient tolerated procedure well.

## 2018-11-07 NOTE — ASSESSMENT & PLAN NOTE
Avoid insulin stacking and hypoglycemia.  Lab Results   Component Value Date    CREATININE 2.3 (H) 11/07/2018

## 2018-11-07 NOTE — PLAN OF CARE
Problem: Patient Care Overview  Goal: Plan of Care Review  Outcome: Ongoing (interventions implemented as appropriate)  -Pt AAOx4  -Vital signs stable  -BG monitoring ACHS, HS BG was 242-2 units SSI given  -Chevron incision JULIANE with steri strips  -Abdomen very distended  -Pt with edema in bilateral lower extremities  -Plan for pt to have para done today  -TEDs on  -Fall precautions maintained, non skid socks worn  -Daughter at bedside  -Bed lowered, locked, siderails up x2 and call bell in reach    See flowsheet for assessment findings.  Will continue to monitor pt.

## 2018-11-07 NOTE — PROGRESS NOTES
[facility]  Nephrology  Progress Note    Patient Name: Scarlett Reddy  MRN: [id number]  Admission Date: 10/1/2018  Hospital Length of Stay: 36 days  Attending Provider: Jani Theodore MD   Primary Care Physician: Primary Doctor No  Principal Problem:Liver transplanted    Subjective:     HPI: 69 y/o F h/o HBV/delta cirrhosis s/p DBDLT 10/5/18 (CMV D+/R+, steroid induction, MMF/tacro/pred maintenance) with take back on 10/6 2/2 hyperbilirubinema and bilious VALORIE drainage, no leak identified. Post-op course c/b hypercapneic and hypoxic respiratory failure and was reintubated though quickly extubated now doing well. Liver bx (10/6) sig for Zone 3 hemorrhage and collapse, in addition to cholestasis. Transferred from ICU on POD #4. ID consulted to comment on positive diphtheroid culture from ascitic fluid (likely skin colonization) as well as ESBL Klebsiella pneumoniae in urine culture (10/4).  Pt asymptomatic and cell count from ascitic fluid unremarkable. Per ID recs, no need to treat diphtheroids or asymptomatic ESBL Kleb pneumo bacteriuria though pt has had 6 days of therapy which would cover these organisms. Mild peritransplant ascites- repeat US 10/6 with increased arterial resistive indices. Repeat US 10/9 w/ continued elevation in RIs and fluid collections. LFTs trending down nicely.      Acute gradual worsening of renal function s/p OLTx.Creatinine on arrival 0.8 and has been up trending. Nephrology consulted for post-op EVA. Cr cont to trend up but remains to have good UOP. Had stable response to lasiz diuresis.  BUN elevated requiring multiple rounds of dialysis (10/15, 10/17, 10/20, 10/31). No significant improvements in kidney function noted.   In addition, pt H/H cont to fluctuate requiring multiple blood transfusions (10/14, 10/16, 10/18, 10/19, 10/22, 10/27, 10/29).  Pt reported dark stools 10/15 but color has normalized. FOBT+. EGD 10/18 consistent with ulcerated mucosa in duodenum s/p  coagulation. Concern for H. Pylori. Started Amoxicillin/levofloxacin (10/19, complete 11/2)). Remains on Protonix 40 mg BID. Biopsy negative for infection and CMV. Will cont close monitoring H/H, transfuse as needed with goal Hb > 7.0.  Of note, Urine cx + Klebsiella pneumoniae (10/13). Received 3 day course of ciprofloxacin, dc'd 10/19.   On 10/19 pm pt c/o crushing chest pain. Troponin 0.094, likely 2/2 ischemic stress. EKG NSR.      11/5/18 - no acute events overnight. Pt reports feeling well this am. One unit of PRBCs given 11/3 with good response. Cr level noted with only minimal increases. Lasix placed on hold per nephrology recs. Will plan to hydrate today w/ albumin. EGD 11/5 unimpressive for overt bleed. Pt remains on protonix 40 BID. Denies melena. Cont to hold heparin. WBC cont to decrease. Immunosuppression managed with hydrocortisone 50 (cont to hold Cellcept, bactrim, valcyte). Prograf now restarted and transitioned IV steroids to PO prednisone on 11/3. CMV PCR noted with 129. Cont to monitor.     Interval History: NAEON. NAD. states improved overall abdominal pain from yesterday. Pt to get paracentesis today.     Review of patient's allergies indicates:  No Known Allergies  Current Facility-Administered Medications   Medication Frequency    atovaquone suspension 1,500 mg Daily    bisacodyl EC tablet 10 mg Daily PRN    bisacodyl suppository 10 mg Daily PRN    calcium carbonate 200 mg calcium (500 mg) chewable tablet 500 mg TID PRN    dextrose 50% injection 12.5 g PRN    dextrose 50% injection 25 g PRN    docusate sodium capsule 100 mg Daily    entecavir tablet 0.5 mg Q72H    fluconazole tablet 200 mg Daily    glucagon (human recombinant) injection 1 mg PRN    glucose chewable tablet 16 g PRN    glucose chewable tablet 24 g PRN    heparin (porcine) injection 5,000 Units Q8H    insulin aspart U-100 pen 0-5 Units QID (AC + HS) PRN    insulin aspart U-100 pen 11-13 Units TIDWM    magnesium  oxide tablet 800 mg BID    midodrine tablet 15 mg TID    mirtazapine tablet 7.5 mg QHS    mycophenolate capsule 500 mg BID    ondansetron disintegrating tablet 8 mg Q8H PRN    ondansetron injection 4 mg Q8H PRN    oxyCODONE immediate release tablet 5 mg Q4H PRN    oxyCODONE immediate release tablet Tab 10 mg Q4H PRN    pantoprazole injection 40 mg BID    polyethylene glycol packet 17 g Daily PRN    polyethylene glycol packet 17 g Daily    predniSONE tablet 20 mg Daily    Followed by    [START ON 11/10/2018] predniSONE tablet 15 mg Daily    Followed by    [START ON 11/17/2018] predniSONE tablet 10 mg Daily    Followed by    [START ON 11/24/2018] predniSONE tablet 5 mg Daily    Followed by    [START ON 12/1/2018] predniSONE tablet 2.5 mg Daily    promethazine (PHENERGAN) 6.25 mg in dextrose 5 % 50 mL IVPB Q6H PRN    sodium chloride 0.9% flush 3 mL PRN    tacrolimus capsule 1 mg BID    ursodiol capsule 300 mg BID       Objective:     Vital Signs (Most Recent):  Temp: 97.5 °F (36.4 °C) (11/07/18 1230)  Pulse: 93 (11/07/18 1230)  Resp: 19 (11/07/18 1230)  BP: (!) 153/94 (11/07/18 1230)  SpO2: 98 % (11/07/18 1230)  O2 Device (Oxygen Therapy): room air (11/07/18 0915) Vital Signs (24h Range):  Temp:  [97.5 °F (36.4 °C)-97.8 °F (36.6 °C)] 97.5 °F (36.4 °C)  Pulse:  [82-98] 93  Resp:  [9-20] 19  SpO2:  [94 %-100 %] 98 %  BP: (128-153)/(76-94) 153/94     Weight: 75.5 kg (166 lb 7.2 oz) (11/07/18 0500)  Body mass index is 29.48 kg/m².  Body surface area is 1.83 meters squared.    I/O last 3 completed shifts:  In: 690 [P.O.:690]  Out: 350 [Urine:350]    Physical Exam   Constitutional: She is oriented to person, place, and time. She is cooperative. She has a sickly appearance. No distress.   HENT:   Head: Normocephalic and atraumatic.   Cardiovascular: Normal rate and regular rhythm.   Pulmonary/Chest: Effort normal.   Abdominal: She exhibits distension and ascites.   Musculoskeletal: She exhibits edema. She  exhibits no tenderness.   BL LE pitting edema, NTTP   Neurological: She is alert and oriented to person, place, and time. GCS eye subscore is 4. GCS verbal subscore is 5. GCS motor subscore is 6.   Skin: Skin is warm. She is not diaphoretic.   Psychiatric: She has a normal mood and affect. Her behavior is normal.   Nursing note and vitals reviewed.      Significant Labs:  CBC:   Recent Labs   Lab 11/07/18  0500   WBC 3.84*   RBC 3.15*   HGB 9.5*   HCT 28.5*   *   MCV 91   MCH 30.2   MCHC 33.3     CMP:   Recent Labs   Lab 11/07/18  0500   GLU 89   CALCIUM 8.5*   ALBUMIN 3.0*   PROT 4.7*   *   K 4.0   CO2 22*   CL 99   BUN 88*   CREATININE 2.3*   ALKPHOS 118   ALT 28   AST 30   BILITOT 1.9*     Coagulation:   Recent Labs   Lab 11/07/18  0500   INR 1.0     LFTs:   Recent Labs   Lab 11/07/18  0500   ALT 28   AST 30   ALKPHOS 118   BILITOT 1.9*   PROT 4.7*   ALBUMIN 3.0*     All labs within the past 24 hours have been reviewed.     Significant Imaging:  reviewed    Assessment/Plan:      Post LT - Acute renal failure    Post-LT EVA is multifactorial in origin and has been related to the severity of liver disease, toxicity of immunosuppressive therapy and hepatic ischaemia reperfusion injury might be a driving force in the aetiology of post-LT EVA.      Plan: 11/7/18  -No indication for CRRT today   -Patient azotemia likely secondary to her H/H dropping from GI bleed  - Primary team to assess for need for paracentesis today with abdominal US  - If not moving forward with or delaying paracentesis, can consider lasix 40 BID as Cr has been stable and pt would benefit from volume removal in setting of severe hypervolemia, pitting edema and abdominal distension  - stable azotemia  No uremia, BUN related to bleed     - we recommend lasix 40 BID in addition to the paracentesis today 11/7                    Thank you for your consult.    Charles Benitez MD  Nephrology  [facility]    ATTENDING PHYSICIAN ATTESTATION  I have personally interviewed and examined the patient. I thoroughly reviewed the demographic, clinical, laboratorial and imaging information available in medical records. I agree with the assessment and recommendations provided by the subspecialty resident. Dr. Benitez was under my supervision.

## 2018-11-07 NOTE — ASSESSMENT & PLAN NOTE
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      Plan: 11/7/18  -No indication for CRRT today   -Patient azotemia likely secondary to her H/H dropping from GI bleed  - Primary team to assess for need for paracentesis today with abdominal US  - If not moving forward with or delaying paracentesis, can consider lasix 40 BID as Cr has been stable and pt would benefit from volume removal in setting of severe hypervolemia, pitting edema and abdominal distension  - stable azotemia  No uremia, BUN related to bleed     - we recommend lasix 40 BID in addition to the paracentesis today 11/7

## 2018-11-07 NOTE — ASSESSMENT & PLAN NOTE
Managed per primary team  Avoid hypoglycemia    Recent Labs   Lab 11/07/18  0500   ALT 28   AST 30   ALKPHOS 118   BILITOT 1.9*   PROT 4.7*   ALBUMIN 3.0*

## 2018-11-07 NOTE — H&P
Radiology History & Physical      SUBJECTIVE:     Chief Complaint: abdominal distention    History of Present Illness:  Scarlett Reddy is a 68 y.o. female who presents for eval of ascites  Past Medical History:   Diagnosis Date    Anemia requiring transfusions 8/16/2018    Ascites     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 8/15/2018    Cirrhosis     Cough 8/30/2018    Esophageal varices     Esophageal varices without bleeding 8/15/2018    Hepatic encephalopathy     Hepatitis B     Hepatitis D virus infection     Hypersplenism     Hyponatremia     Hypotension     Liver transplant candidate     Neutropenia     Portal hypertension     Severe malnutrition 8/16/2018    Type 2 diabetes mellitus, with long-term current use of insulin 8/15/2018     Past Surgical History:   Procedure Laterality Date    EGD (ESOPHAGOGASTRODUODENOSCOPY) N/A 11/5/2018    Performed by Johann Mejia MD at [facility] ENDO (2ND FLR)    EGD (ESOPHAGOGASTRODUODENOSCOPY) N/A 10/18/2018    Performed by Chung Robins MD at [facility] (2ND FLR)    EGD (ESOPHAGOGASTRODUODENOSCOPY) N/A 8/17/2018    Performed by Travon Delgadillo MD at [facility] (2ND FLR)    ESOPHAGOGASTRODUODENOSCOPY N/A 8/17/2018    Procedure: EGD (ESOPHAGOGASTRODUODENOSCOPY);  Surgeon: Travon Delgadillo MD;  Location: [facility] ([address] [place] [address]);  Service: Endoscopy;  Laterality: N/A;    ESOPHAGOGASTRODUODENOSCOPY N/A 10/18/2018    Procedure: EGD (ESOPHAGOGASTRODUODENOSCOPY);  Surgeon: Chung Robins MD;  Location: [facility] ([address] [place] [address]);  Service: Endoscopy;  Laterality: N/A;    ESOPHAGOGASTRODUODENOSCOPY N/A 11/5/2018    Procedure: EGD (ESOPHAGOGASTRODUODENOSCOPY);  Surgeon: Johann Mejia MD;  [facility]  Service: Endoscopy;  Laterality: N/A;    EXPLORATORY LAPAROTOMY AFTER LIVER TRANSPLANTATION N/A 10/6/2018    Procedure: LAPAROTOMY, EXPLORATORY, AFTER LIVER TRANSPLANT;  Surgeon: Benson Matson MD;  [facility];  Service: Transplant;   Laterality: N/A;    LAPAROTOMY, EXPLORATORY, AFTER LIVER TRANSPLANT N/A 10/6/2018    Performed by Benson Matson MD at [facility] OR [address] [place] [address]    LIVER TRANSPLANT N/A 10/4/2018    Procedure: TRANSPLANT, LIVER;  Surgeon: Yony Burns MD;  Location: [facility] OR [address] [place] [address];  Service: Transplant;  Laterality: N/A;    TRANSPLANT, LIVER N/A 10/4/2018    Performed by Yony Burns MD at [facility] OR [address] [place] [address]       Home Meds:   Prior to Admission medications    Medication Sig Start Date End Date Taking? Authorizing Provider   insulin aspart U-100 (NOVOLOG FLEXPEN U-100 INSULIN) 100 unit/mL InPn pen Inject 20 units into the skin with breakfast,17 units with lunch, and 17 with dinner Plus correction scale, max TDD 60 units daily 9/27/18  Yes Seble Tineo NP   insulin glargine (LANTUS) 100 unit/mL injection Inject 20 Units into the skin every evening. 9/27/18  Yes Seble Tineo NP   midodrine (PROAMATINE) 5 MG Tab Take 3 tablets (15 mg total) by mouth every 8 (eight) hours. 8/24/18 2/20/19 Yes Gigi Sprague MD   OMEGA-3 FATTY ACIDS-EPA ORAL Take by mouth.   Yes Historical Provider, MD   rifAXIMin (XIFAXAN) 550 mg Tab Take 1 tablet (550 mg total) by mouth 2 (two) times daily. 8/27/18  Yes Rita Rubin NP   sodium bicarbonate 650 MG tablet Take 2 tablets (1,300 mg total) by mouth 3 (three) times daily. 9/21/18  Yes Gigi Sprague MD   blood sugar diagnostic (ACCU-CHEK DC) Strp 1 strip by Misc.(Non-Drug; Combo Route) route 3 (three) times daily.    Historical Provider, MD   entecavir (BARACLUDE) 0.5 MG Tab Take 1 tablet (0.5 mg total) by mouth once daily. 10/5/18   Benson Matson MD   ergocalciferol (VITAMIN D2) 50,000 unit Cap Take 1 capsule (50,000 Units total) by mouth every 7 days. 10/5/18   Benson Matson MD   famotidine (PEPCID) 20 MG tablet Take 1 tablet (20 mg total) by mouth once daily. 10/5/18   Benson Matson MD   mycophenolate (CELLCEPT) 250 mg Cap Take 4 capsules (1,000 mg total) by  "mouth 2 (two) times daily. 10/5/18   Benson Matson MD   pen needle, diabetic (BD ULTRA-FINE CITLALI PEN NEEDLE) 32 gauge x 5/32" Ndle To use to inject insulin 4x daily 9/14/18   Rita Rubin NP   predniSONE (DELTASONE) 10 MG tablet Take by mouth daily: 20mg 10/10-10/16, 15mg 10/17-10/23, 10mg 10/24-10/30, 5mg 10/31-11/6, 2.5mg 11/7-11/13, Stop 11/14 10/5/18   Benson Matson MD   sulfamethoxazole-trimethoprim 400-80mg (BACTRIM,SEPTRA) 400-80 mg per tablet Take 1 tablet by mouth once daily. STOP 4/3/19 10/5/18 4/3/19  Benson Matson MD   tacrolimus (PROGRAF) 1 MG Cap Take 6 capsules (6 mg total) by mouth every 12 (twelve) hours. 10/5/18   Benson Matson MD   valGANciclovir (VALCYTE) 450 mg Tab Take 1 tablet (450 mg total) by mouth once daily. STOP 1/3/19 10/5/18 1/3/19  Benson Matson MD     Anticoagulants/Antiplatelets: no anticoagulation    Allergies: Review of patient's allergies indicates:  No Known Allergies  Sedation History:  no adverse reactions    Review of Systems:   Hematological: no known coagulopathies  Respiratory: no shortness of breath  Cardiovascular: no chest pain  Gastrointestinal: no abdominal pain  Genito-Urinary: no dysuria  Musculoskeletal: negative  Neurological: no TIA or stroke symptoms         OBJECTIVE:     Vital Signs (Most Recent)  Temp: 97.5 °F (36.4 °C) (11/07/18 1230)  Pulse: 96 (11/07/18 1410)  Resp: 18 (11/07/18 1410)  BP: (!) 154/95 (11/07/18 1410)  SpO2: 97 % (11/07/18 1410)    Physical Exam:  ASA: 2  Mallampati: na    General: no acute distress  Mental Status: alert and oriented to person, place and time  HEENT: normocephalic, atraumatic  Chest: unlabored breathing  Heart: regular heart rate  Abdomen: distended  Extremity: moves all extremities    Laboratory  Lab Results   Component Value Date    INR 1.0 11/07/2018       Lab Results   Component Value Date    WBC 3.84 (L) 11/07/2018    HGB 9.5 (L) 11/07/2018    HCT 28.5 (L) 11/07/2018    " MCV 91 11/07/2018     (L) 11/07/2018      Lab Results   Component Value Date    GLU 89 11/07/2018     (L) 11/07/2018    K 4.0 11/07/2018    CL 99 11/07/2018    CO2 22 (L) 11/07/2018    BUN 88 (H) 11/07/2018    CREATININE 2.3 (H) 11/07/2018    CALCIUM 8.5 (L) 11/07/2018    MG 2.2 11/07/2018    ALT 28 11/07/2018    AST 30 11/07/2018    ALBUMIN 3.0 (L) 11/07/2018    BILITOT 1.9 (H) 11/07/2018    BILIDIR 11.8 (H) 10/08/2018       ASSESSMENT/PLAN:     Sedation Plan: local anesthesia  Patient will undergo US guided paracentesis

## 2018-11-07 NOTE — PROGRESS NOTES
Paracentesis complete.8900  mLs peritoneal fluid drained. Pt tolerated well. Dressing to left abd lean, dry, and intact. Albumin 25% given 150 mLs. Specimens sent per lab order. Report called to floor nurse.

## 2018-11-07 NOTE — PROGRESS NOTES
"[facility]  Endocrinology  Progress Note    Admit Date: 10/1/2018     Reason for Consult: Management of type 2 DM, Hyperglycemia      Surgical Procedure and Date: Liver transplant 10/5/18, Ex lap 10/6/18     Diabetes diagnosis year:      Home Diabetes Medications:  Lantus 18 units HS and novolog 17 units with meals plus correction scale (150-200 +1)        Lab Results   Component Value Date     HGBA1C 6.8 (H) 2018        How often checking glucose at home? 3-4   BG readings on regimen: 120-150.  Post prandial readings as high as upper 200s  Hypoglycemia on the regimen? yes, none recently   Missed doses on regimen?  No     Diabetes Complications include:     Diabetic peripheral neuropathy      Complicating diabetes co morbidities:   CIRRHOSIS        HPI:  Patient is a 68 y.o. female with a diagnosis of ESLD, listed for liver transplant with meld 23 who underwent live transplant on 10/5/18.  Back to OR on 10/6/18 for ex lap.  Admitted 10/1/18 for hyponatremia s/p paracentesis.  Personal has known diagnosis of diabetes, endocrine consulted for diabetes management.    Post-operative course complicated by ESBL Klebsiella pneumoniae in urine (in contact isolation), peritransplant ascites, worsening renal function (required temporary dialysis), anemia (multiple blood transfusions), and possible GI bleed.     Interval HPI:   Overnight events:  Remains in TSU. BG elevated yesterday and below goal this AM. Tentative paracentesis today. Creatinine remains elevated at 2.3.  Eatin%  Nausea: No  Hypoglycemia and intervention: No  Fever: No  TPN and/or TF: No    BP (!) 141/87   Pulse 98   Temp 97.8 °F (36.6 °C) (Oral)   Resp 11   Ht 5' 3" (1.6 m)   Wt 75.5 kg (166 lb 7.2 oz)   LMP  (LMP Unknown)   SpO2 99%   Breastfeeding? No   BMI 29.48 kg/m²      Labs Reviewed and Include    Recent Labs   Lab 18  0500   GLU 89   CALCIUM 8.5*   ALBUMIN 3.0*   PROT 4.7*   *   K 4.0   CO2 22* "   CL 99   BUN 88*   CREATININE 2.3*   ALKPHOS 118   ALT 28   AST 30   BILITOT 1.9*     Lab Results   Component Value Date    WBC 3.84 (L) 11/07/2018    HGB 9.5 (L) 11/07/2018    HCT 28.5 (L) 11/07/2018    MCV 91 11/07/2018     (L) 11/07/2018     No results for input(s): TSH, FREET4 in the last 168 hours.  Lab Results   Component Value Date    HGBA1C 6.0 (H) 10/04/2018       Nutritional status:   Body mass index is 29.48 kg/m².  Lab Results   Component Value Date    ALBUMIN 3.0 (L) 11/07/2018    ALBUMIN 3.0 (L) 11/06/2018    ALBUMIN 3.7 11/05/2018     Lab Results   Component Value Date    PREALBUMIN 15 (L) 11/03/2018    PREALBUMIN 12 (L) 09/17/2018       Estimated Creatinine Clearance: 22.4 mL/min (A) (based on SCr of 2.3 mg/dL (H)).    Accu-Checks  Recent Labs     11/05/18  1111 11/05/18  1202 11/05/18  1314 11/05/18  1625 11/05/18  2037 11/06/18  0802 11/06/18  1450 11/06/18  2003 11/06/18  2043 11/07/18  0834   POCTGLUCOSE 202* 194* 208* 262* 247* 178* 228* 234* 242* 99       Current Medications and/or Treatments Impacting Glycemic Control  Immunotherapy:    Immunosuppressants         Stop Route Frequency     mycophenolate capsule 500 mg      -- Oral 2 times daily     tacrolimus capsule 1 mg      -- Oral 2 times daily        Steroids:   Hormones (From admission, onward)    Start     Stop Route Frequency Ordered    12/01/18 0900  predniSONE tablet 2.5 mg      12/08 0859 Oral Daily 11/05/18 1004    11/24/18 0900  predniSONE tablet 5 mg      12/01 0859 Oral Daily 11/05/18 1004    11/17/18 0900  predniSONE tablet 10 mg      11/24 0859 Oral Daily 11/05/18 1004    11/10/18 0900  predniSONE tablet 15 mg      11/17 0859 Oral Daily 11/05/18 1004    11/06/18 0900  predniSONE tablet 20 mg      11/10 0859 Oral Daily 11/05/18 1004    10/17/18 0945  predniSONE tablet 15 mg      10/24 0859 Oral Daily 10/17/18 0943        Pressors:    Autonomic Drugs (From admission, onward)    Start     Stop Route Frequency Ordered     10/10/18 1500  midodrine tablet 15 mg      -- Oral 3 times daily 10/10/18 1122    10/06/18 1617  rocuronium (ZEMURON) 10 mg/mL injection     Comments:  Created by cabinet override    10/07 0429   10/06/18 1617        Hyperglycemia/Diabetes Medications:   Antihyperglycemics (From admission, onward)    Start     Stop Route Frequency Ordered    11/05/18 0745  insulin aspart U-100 pen 11-13 Units      -- SubQ 3 times daily with meals 11/05/18 0738    10/14/18 0925  insulin aspart U-100 pen 0-5 Units      -- SubQ Before meals & nightly PRN 10/14/18 0825    10/07/18 1200  insulin regular (Humulin R) 100 Units in sodium chloride 0.9% 100 mL infusion      10/13 2100 IV Continuous 10/07/18 1055          ASSESSMENT and PLAN    * Liver transplanted    Managed per primary team  Avoid hypoglycemia    Recent Labs   Lab 11/07/18  0500   ALT 28   AST 30   ALKPHOS 118   BILITOT 1.9*   PROT 4.7*   ALBUMIN 3.0*            Type 2 diabetes mellitus with diabetic polyneuropathy, with long-term current use of insulin    BG goal 140-180.     continue Novolog to 11-13 units with meals; appetite remains variable each day depending on how she is feeling  Low dose correction scale given kidney function.  BG monitoring AC/HS    Discharge planning:  TBD     Corticosteroids adverse reaction    On steroid taper per transplant team; may elevate BG readings         EVA (acute kidney injury)    Avoid insulin stacking and hypoglycemia.  Lab Results   Component Value Date    CREATININE 2.3 (H) 11/07/2018        Prophylactic immunotherapy    May increase insulin resistance.        Severe malnutrition    Oral intake encouraged, may affect BG readings         Aviva Caldera NP  Endocrinology  [facility]

## 2018-11-07 NOTE — PT/OT/SLP PROGRESS
Occupational Therapy      Patient Name:  Scarlett Reddy   MRN:  [id number]    Attempted occupational therapy visit this date ( 14:07). Patient not seen today secondary to Unavailable (off floor upon attempt in PM). Unable to re-attempt later in day. Will follow-up as scheuled.    Laura corado OT  11/7/2018

## 2018-11-07 NOTE — SUBJECTIVE & OBJECTIVE
"Interval HPI:   Overnight events:  Remains in TSU. BG elevated yesterday and below goal this AM. Tentative paracentesis today. Creatinine remains elevated at 2.3.  Eatin%  Nausea: No  Hypoglycemia and intervention: No  Fever: No  TPN and/or TF: No    BP (!) 141/87   Pulse 98   Temp 97.8 °F (36.6 °C) (Oral)   Resp 11   Ht 5' 3" (1.6 m)   Wt 75.5 kg (166 lb 7.2 oz)   LMP  (LMP Unknown)   SpO2 99%   Breastfeeding? No   BMI 29.48 kg/m²     Labs Reviewed and Include    Recent Labs   Lab 18  0500   GLU 89   CALCIUM 8.5*   ALBUMIN 3.0*   PROT 4.7*   *   K 4.0   CO2 22*   CL 99   BUN 88*   CREATININE 2.3*   ALKPHOS 118   ALT 28   AST 30   BILITOT 1.9*     Lab Results   Component Value Date    WBC 3.84 (L) 2018    HGB 9.5 (L) 2018    HCT 28.5 (L) 2018    MCV 91 2018     (L) 2018     No results for input(s): TSH, FREET4 in the last 168 hours.  Lab Results   Component Value Date    HGBA1C 6.0 (H) 10/04/2018       Nutritional status:   Body mass index is 29.48 kg/m².  Lab Results   Component Value Date    ALBUMIN 3.0 (L) 2018    ALBUMIN 3.0 (L) 2018    ALBUMIN 3.7 2018     Lab Results   Component Value Date    PREALBUMIN 15 (L) 2018    PREALBUMIN 12 (L) 2018       Estimated Creatinine Clearance: 22.4 mL/min (A) (based on SCr of 2.3 mg/dL (H)).    Accu-Checks  Recent Labs     18  1111 18  1202 18  1314 18  1625 18  2037 18  0802 18  1450 18  2003 18  2043 18  0834   POCTGLUCOSE 202* 194* 208* 262* 247* 178* 228* 234* 242* 99       Current Medications and/or Treatments Impacting Glycemic Control  Immunotherapy:    Immunosuppressants         Stop Route Frequency     mycophenolate capsule 500 mg      -- Oral 2 times daily     tacrolimus capsule 1 mg      -- Oral 2 times daily        Steroids:   Hormones (From admission, onward)    Start     Stop Route Frequency Ordered    18 " 0900  predniSONE tablet 2.5 mg      12/08 0859 Oral Daily 11/05/18 1004    11/24/18 0900  predniSONE tablet 5 mg      12/01 0859 Oral Daily 11/05/18 1004    11/17/18 0900  predniSONE tablet 10 mg      11/24 0859 Oral Daily 11/05/18 1004    11/10/18 0900  predniSONE tablet 15 mg      11/17 0859 Oral Daily 11/05/18 1004    11/06/18 0900  predniSONE tablet 20 mg      11/10 0859 Oral Daily 11/05/18 1004    10/17/18 0945  predniSONE tablet 15 mg      10/24 0859 Oral Daily 10/17/18 0943        Pressors:    Autonomic Drugs (From admission, onward)    Start     Stop Route Frequency Ordered    10/10/18 1500  midodrine tablet 15 mg      -- Oral 3 times daily 10/10/18 1122    10/06/18 1617  rocuronium (ZEMURON) 10 mg/mL injection     Comments:  Created by cabinet override    10/07 0429   10/06/18 1617        Hyperglycemia/Diabetes Medications:   Antihyperglycemics (From admission, onward)    Start     Stop Route Frequency Ordered    11/05/18 0745  insulin aspart U-100 pen 11-13 Units      -- SubQ 3 times daily with meals 11/05/18 0738    10/14/18 0925  insulin aspart U-100 pen 0-5 Units      -- SubQ Before meals & nightly PRN 10/14/18 0825    10/07/18 1200  insulin regular (Humulin R) 100 Units in sodium chloride 0.9% 100 mL infusion      10/13 2100 IV Continuous 10/07/18 1055

## 2018-11-08 PROBLEM — K26.9 DUODENAL ULCER: Status: RESOLVED | Noted: 2018-10-19 | Resolved: 2018-11-08

## 2018-11-08 PROBLEM — T38.0X5A CORTICOSTEROIDS ADVERSE REACTION: Status: RESOLVED | Noted: 2018-10-05 | Resolved: 2018-11-08

## 2018-11-08 PROBLEM — I85.00 ESOPHAGEAL VARICES WITHOUT BLEEDING: Status: RESOLVED | Noted: 2018-08-15 | Resolved: 2018-11-08

## 2018-11-08 LAB
ALBUMIN SERPL BCP-MCNC: 2.7 G/DL
ALP SERPL-CCNC: 85 U/L
ALT SERPL W/O P-5'-P-CCNC: 18 U/L
ANION GAP SERPL CALC-SCNC: 9 MMOL/L
AST SERPL-CCNC: 20 U/L
BASOPHILS # BLD AUTO: 0.01 K/UL
BASOPHILS NFR BLD: 0.3 %
BILIRUB SERPL-MCNC: 1.6 MG/DL
BUN SERPL-MCNC: 81 MG/DL
CALCIUM SERPL-MCNC: 8.2 MG/DL
CHLORIDE SERPL-SCNC: 99 MMOL/L
CO2 SERPL-SCNC: 24 MMOL/L
CREAT SERPL-MCNC: 2.4 MG/DL
DIFFERENTIAL METHOD: ABNORMAL
EOSINOPHIL # BLD AUTO: 0 K/UL
EOSINOPHIL NFR BLD: 0.3 %
ERYTHROCYTE [DISTWIDTH] IN BLOOD BY AUTOMATED COUNT: 17.4 %
EST. GFR  (AFRICAN AMERICAN): 23 ML/MIN/1.73 M^2
EST. GFR  (NON AFRICAN AMERICAN): 20 ML/MIN/1.73 M^2
GLUCOSE SERPL-MCNC: 84 MG/DL
HCT VFR BLD AUTO: 26.2 %
HGB BLD-MCNC: 8.4 G/DL
IMM GRANULOCYTES # BLD AUTO: 0.14 K/UL
IMM GRANULOCYTES NFR BLD AUTO: 4.4 %
INR PPP: 1.1
LYMPHOCYTES # BLD AUTO: 0.2 K/UL
LYMPHOCYTES NFR BLD: 6.3 %
MAGNESIUM SERPL-MCNC: 2.1 MG/DL
MCH RBC QN AUTO: 29.2 PG
MCHC RBC AUTO-ENTMCNC: 32.1 G/DL
MCV RBC AUTO: 91 FL
MONOCYTES # BLD AUTO: 0.4 K/UL
MONOCYTES NFR BLD: 11.4 %
NEUTROPHILS # BLD AUTO: 2.4 K/UL
NEUTROPHILS NFR BLD: 77.3 %
NRBC BLD-RTO: 0 /100 WBC
PHOSPHATE SERPL-MCNC: 4.1 MG/DL
PLATELET # BLD AUTO: 82 K/UL
PMV BLD AUTO: 10.9 FL
POCT GLUCOSE: 157 MG/DL (ref 70–110)
POCT GLUCOSE: 96 MG/DL (ref 70–110)
POTASSIUM SERPL-SCNC: 4.1 MMOL/L
PROT SERPL-MCNC: 4 G/DL
PROTHROMBIN TIME: 11 SEC
RBC # BLD AUTO: 2.88 M/UL
SODIUM SERPL-SCNC: 132 MMOL/L
TACROLIMUS BLD-MCNC: 8.6 NG/ML
WBC # BLD AUTO: 3.15 K/UL

## 2018-11-08 PROCEDURE — 99900035 HC TECH TIME PER 15 MIN (STAT)

## 2018-11-08 PROCEDURE — 25000003 PHARM REV CODE 250: Performed by: NURSE PRACTITIONER

## 2018-11-08 PROCEDURE — P9047 ALBUMIN (HUMAN), 25%, 50ML: HCPCS | Mod: JG | Performed by: NURSE PRACTITIONER

## 2018-11-08 PROCEDURE — 99233 SBSQ HOSP IP/OBS HIGH 50: CPT | Mod: 24,,, | Performed by: NURSE PRACTITIONER

## 2018-11-08 PROCEDURE — 80053 COMPREHEN METABOLIC PANEL: CPT

## 2018-11-08 PROCEDURE — 85610 PROTHROMBIN TIME: CPT

## 2018-11-08 PROCEDURE — 94761 N-INVAS EAR/PLS OXIMETRY MLT: CPT

## 2018-11-08 PROCEDURE — 63600175 PHARM REV CODE 636 W HCPCS: Performed by: NURSE PRACTITIONER

## 2018-11-08 PROCEDURE — C9113 INJ PANTOPRAZOLE SODIUM, VIA: HCPCS | Performed by: PHYSICIAN ASSISTANT

## 2018-11-08 PROCEDURE — 84100 ASSAY OF PHOSPHORUS: CPT

## 2018-11-08 PROCEDURE — 20600001 HC STEP DOWN PRIVATE ROOM

## 2018-11-08 PROCEDURE — 25000003 PHARM REV CODE 250: Performed by: PHYSICIAN ASSISTANT

## 2018-11-08 PROCEDURE — 63600175 PHARM REV CODE 636 W HCPCS: Performed by: PHYSICIAN ASSISTANT

## 2018-11-08 PROCEDURE — 27000646 HC AEROBIKA DEVICE

## 2018-11-08 PROCEDURE — 85025 COMPLETE CBC W/AUTO DIFF WBC: CPT

## 2018-11-08 PROCEDURE — 25000003 PHARM REV CODE 250: Performed by: TRANSPLANT SURGERY

## 2018-11-08 PROCEDURE — 99232 SBSQ HOSP IP/OBS MODERATE 35: CPT | Mod: ,,, | Performed by: INTERNAL MEDICINE

## 2018-11-08 PROCEDURE — 80197 ASSAY OF TACROLIMUS: CPT

## 2018-11-08 PROCEDURE — 94664 DEMO&/EVAL PT USE INHALER: CPT

## 2018-11-08 PROCEDURE — 83735 ASSAY OF MAGNESIUM: CPT

## 2018-11-08 PROCEDURE — 99232 SBSQ HOSP IP/OBS MODERATE 35: CPT | Mod: ,,, | Performed by: NURSE PRACTITIONER

## 2018-11-08 RX ORDER — PANTOPRAZOLE SODIUM 40 MG/1
40 TABLET, DELAYED RELEASE ORAL 2 TIMES DAILY
Status: DISCONTINUED | OUTPATIENT
Start: 2018-11-08 | End: 2018-12-21 | Stop reason: HOSPADM

## 2018-11-08 RX ORDER — ALBUMIN HUMAN 250 G/1000ML
25 SOLUTION INTRAVENOUS EVERY 8 HOURS
Status: COMPLETED | OUTPATIENT
Start: 2018-11-08 | End: 2018-11-09

## 2018-11-08 RX ORDER — NAPROXEN SODIUM 220 MG/1
81 TABLET, FILM COATED ORAL DAILY
Status: DISCONTINUED | OUTPATIENT
Start: 2018-11-09 | End: 2018-12-21 | Stop reason: HOSPADM

## 2018-11-08 RX ADMIN — URSODIOL 300 MG: 300 CAPSULE ORAL at 09:11

## 2018-11-08 RX ADMIN — PANTOPRAZOLE SODIUM 40 MG: 40 INJECTION, POWDER, FOR SOLUTION INTRAVENOUS at 08:11

## 2018-11-08 RX ADMIN — ENTECAVIR 0.5 MG: 0.5 TABLET ORAL at 09:11

## 2018-11-08 RX ADMIN — MIDODRINE HYDROCHLORIDE 15 MG: 5 TABLET ORAL at 08:11

## 2018-11-08 RX ADMIN — MYCOPHENOLATE MOFETIL 500 MG: 250 CAPSULE ORAL at 08:11

## 2018-11-08 RX ADMIN — MIDODRINE HYDROCHLORIDE 15 MG: 5 TABLET ORAL at 09:11

## 2018-11-08 RX ADMIN — HEPARIN SODIUM 5000 UNITS: 5000 INJECTION, SOLUTION INTRAVENOUS; SUBCUTANEOUS at 09:11

## 2018-11-08 RX ADMIN — MAGNESIUM OXIDE TAB 400 MG (241.3 MG ELEMENTAL MG) 800 MG: 400 (241.3 MG) TAB at 09:11

## 2018-11-08 RX ADMIN — INSULIN ASPART 8 UNITS: 100 INJECTION, SOLUTION INTRAVENOUS; SUBCUTANEOUS at 05:11

## 2018-11-08 RX ADMIN — MIRTAZAPINE 7.5 MG: 7.5 TABLET ORAL at 09:11

## 2018-11-08 RX ADMIN — TACROLIMUS 1 MG: 1 CAPSULE ORAL at 08:11

## 2018-11-08 RX ADMIN — MYCOPHENOLATE MOFETIL 500 MG: 250 CAPSULE ORAL at 09:11

## 2018-11-08 RX ADMIN — INSULIN ASPART 8 UNITS: 100 INJECTION, SOLUTION INTRAVENOUS; SUBCUTANEOUS at 12:11

## 2018-11-08 RX ADMIN — PREDNISONE 20 MG: 10 TABLET ORAL at 08:11

## 2018-11-08 RX ADMIN — ATOVAQUONE 1500 MG: 750 SUSPENSION ORAL at 08:11

## 2018-11-08 RX ADMIN — FLUCONAZOLE 200 MG: 200 TABLET ORAL at 08:11

## 2018-11-08 RX ADMIN — MIDODRINE HYDROCHLORIDE 15 MG: 5 TABLET ORAL at 03:11

## 2018-11-08 RX ADMIN — URSODIOL 300 MG: 300 CAPSULE ORAL at 08:11

## 2018-11-08 RX ADMIN — TACROLIMUS 1 MG: 1 CAPSULE ORAL at 05:11

## 2018-11-08 RX ADMIN — ALBUMIN HUMAN 25 G: 0.25 SOLUTION INTRAVENOUS at 11:11

## 2018-11-08 RX ADMIN — HEPARIN SODIUM 5000 UNITS: 5000 INJECTION, SOLUTION INTRAVENOUS; SUBCUTANEOUS at 03:11

## 2018-11-08 RX ADMIN — MAGNESIUM OXIDE TAB 400 MG (241.3 MG ELEMENTAL MG) 800 MG: 400 (241.3 MG) TAB at 08:11

## 2018-11-08 RX ADMIN — ALBUMIN HUMAN 25 G: 0.25 SOLUTION INTRAVENOUS at 09:11

## 2018-11-08 RX ADMIN — INSULIN ASPART 4 UNITS: 100 INJECTION, SOLUTION INTRAVENOUS; SUBCUTANEOUS at 08:11

## 2018-11-08 RX ADMIN — PANTOPRAZOLE SODIUM 40 MG: 40 TABLET, DELAYED RELEASE ORAL at 09:11

## 2018-11-08 RX ADMIN — POLYETHYLENE GLYCOL 3350 17 G: 17 POWDER, FOR SOLUTION ORAL at 08:11

## 2018-11-08 RX ADMIN — DOCUSATE SODIUM 100 MG: 100 CAPSULE, LIQUID FILLED ORAL at 08:11

## 2018-11-08 RX ADMIN — HEPARIN SODIUM 5000 UNITS: 5000 INJECTION, SOLUTION INTRAVENOUS; SUBCUTANEOUS at 05:11

## 2018-11-08 NOTE — PROGRESS NOTES
EDUCATION NOTE:    Met with Scarlett Welchmocurtis and her caregivers to provide teaching re: immunosuppressant medications.  Reviewed medication section of the Liver Transplant Education book that was provided.  Emphasized the importance of compliance, role of the blue medication card, concerns for drug interactions, and process of obtaining refills.  Counseled regarding Prograf, Cellcept , prednisone, including directions for use, monitoring, how to handle missed doses, and side effects.  Pt's caregiver verbalized understanding and had the opportunity to ask questions.

## 2018-11-08 NOTE — PROGRESS NOTES
Met with patient,  and friend, Hayley, for discharge teaching. Dunia interpreted this session.  Reviewed My New Journey: Living Smart After My Liver Transplant.  Sections reviewed were First Steps and Appointments.  Patient and  were taught previously but patient's son and daughter were here previously.  Will complete education in a few days.  Allowed time for questions and answers.

## 2018-11-08 NOTE — PROGRESS NOTES
[facility]  Liver Transplant  Progress Note    Patient Name: Scarlett Reddy  MRN: [id number]  Admission Date: 10/1/2018  Hospital Length of Stay: 37 days  Code Status: Full Code  Primary Care Provider: Primary Doctor No  Post-Operative Day: 34    ORGAN:   LIVER  Disease Etiology: Cirrhosis: Type B and D  Donor Type:    - Brain Death  CDC High Risk:   No  Donor CMV Status:   Donor CMV Status: Positive  Donor HBcAB:   Negative  Donor HCV Status:   Negative  Donor HBV SETH: Negative  Donor HCV SETH: Negative  Whole or Partial: Whole Liver  Biliary Anastomosis: End to End  Arterial Anatomy: Replaced Left Hepatic and Right Hepatic  Subjective:     History of Present Illness:  Ms. Reddy is a 69 y/o female with PMH of ESLD secondary Hep B and D.  Listed for liver transplant with MELD 23.  Paracentesis 10/1 with 5L removed, no fluid sent for analysis.  Morning labs significant for hyponatremia, Na 119.  Pt admitted to LTS for sodium monitoring.        Hospital Course:  Hospital Course: 69 y/o F h/o HBV/delta cirrhosis s/p DBDLT 10/5/18 (CMV D+/R+, steroid induction, MMF/tacro/pred maintenance) with take back on 10/6 2/2 hyperbilirubinema and bilious VALORIE drainage, no leak identified. Post-op course c/b hypercapneic and hypoxic respiratory failure and was reintubated though quickly extubated now doing well. Liver bx (10/6) sig for Zone 3 hemorrhage and collapse, in addition to cholestasis. Transferred from ICU on POD #4. ID consulted to comment on positive diphtheroid culture from ascitic fluid (likely skin colonization) as well as ESBL Klebsiella pneumoniae in urine culture (10/4).  Pt asymptomatic and cell count from ascitic fluid unremarkable. Per ID recs, no need to treat diphtheroids or asymptomatic ESBL Kleb pneumo bacteriuria though pt has had 6 days of therapy which would cover these organisms. Mild peritransplant ascites- repeat US 10/6 with increased arterial resistive indices. Repeat US 10/9  w/ continued elevation in RIs and fluid collections. LFTs trending down nicely.     Acute gradual worsening of renal function s/p OLTx.Creatinine on arrival 0.8 and has been up trending. Nephrology consulted for post-op EVA. Cr cont to trend up but remains to have good UOP. Had stable response to lasiz diuresis.  BUN elevated requiring multiple rounds of dialysis (10/15, 10/17, 10/20, 10/31). No significant improvements in kidney function noted.   In addition, pt H/H cont to fluctuate requiring multiple blood transfusions (10/14, 10/16, 10/18, 10/19, 10/22, 10/27, 10/29).  Pt reported dark stools 10/15 but color has normalized. FOBT+. EGD 10/18 consistent with ulcerated mucosa in duodenum s/p coagulation. Concern for H. Pylori. Started Amoxicillin/levofloxacin (10/19, complete 11/2)). Remains on Protonix 40 mg BID. Biopsy negative for infection and CMV. Will cont close monitoring H/H, transfuse as needed with goal Hb > 7.0.  Of note, Urine cx + Klebsiella pneumoniae (10/13). Received 3 day course of ciprofloxacin, dc'd 10/19.   On 10/19 pm pt c/o crushing chest pain. Troponin 0.094, likely 2/2 ischemic stress. EKG NSR.     Interval history: No acute events overnight. Paracentesis yesterday with 8.9 L removed. Wbc 39, segs 16%. Pt feels better since having fluid removed but she continues to have abdominal distention. Cr 2.4 from 2.3. BUN 81 from 88. No RRT today. Will give Albumin 25% x 3 doses. Liver US 11/6 showed no arterial flow. Repeat Liver US today to assess for arterial blood flow. Wbc decreased but stable. Valcyte remains on hold. Cellcept restarted at 500 mg bid on 11/6. CMV  on 11/1. CMV PCR pending, 11/8. Monitor.     Scheduled Meds:   [START ON 11/6/2018] atovaquone  1,500 mg Oral Daily    docusate sodium  100 mg Oral Daily    entecavir  0.5 mg Oral Q72H    fluconazole  200 mg Oral Daily    insulin aspart U-100  11-13 Units Subcutaneous TIDWM    magnesium oxide  800 mg Oral BID    midodrine   15 mg Oral TID    mirtazapine  7.5 mg Oral QHS    pantoprazole  40 mg Intravenous BID    polyethylene glycol  17 g Oral Daily    [START ON 11/6/2018] predniSONE  20 mg Oral Daily    Followed by    [START ON 11/10/2018] predniSONE  15 mg Oral Daily    Followed by    [START ON 11/17/2018] predniSONE  10 mg Oral Daily    Followed by    [START ON 11/24/2018] predniSONE  5 mg Oral Daily    Followed by    [START ON 12/1/2018] predniSONE  2.5 mg Oral Daily    tacrolimus  1 mg Oral BID    ursodiol  300 mg Oral BID     Continuous Infusions:  PRN Meds:bisacodyl, bisacodyl, calcium carbonate, dextrose 50%, dextrose 50%, diphenhydrAMINE, fentaNYL, glucagon (human recombinant), glucose, glucose, HYDROmorphone, insulin aspart U-100, meperidine, ondansetron, ondansetron, ondansetron, oxyCODONE, oxyCODONE, polyethylene glycol, promethazine (PHENERGAN) IVPB, sodium chloride 0.9%    Review of Systems   Constitutional: Positive for activity change, appetite change (improving) and fatigue. Negative for chills and fever.   HENT: Negative for congestion and facial swelling.    Eyes: Negative for pain, discharge and visual disturbance.   Respiratory: Negative for cough, chest tightness, shortness of breath and wheezing.    Cardiovascular: Positive for leg swelling. Negative for chest pain and palpitations.   Gastrointestinal: Positive for abdominal distention and abdominal pain. Negative for blood in stool, constipation, diarrhea, nausea and vomiting.   Endocrine: Negative.    Genitourinary: Negative for decreased urine volume, difficulty urinating and dysuria.   Musculoskeletal: Negative for arthralgias and back pain.   Skin: Positive for wound.   Allergic/Immunologic: Positive for immunocompromised state.   Neurological: Positive for weakness. Negative for tremors and headaches.   Psychiatric/Behavioral: Negative for confusion and sleep disturbance.     Objective:     Vital Signs (Most Recent):  Temp: 97.3 °F (36.3 °C)  (11/05/18 1312)  Pulse: 74 (11/05/18 1312)  Resp: 16 (11/05/18 1312)  BP: 122/73 (11/05/18 1312)  SpO2: 97 % (11/05/18 1312) Vital Signs (24h Range):  Temp:  [96.1 °F (35.6 °C)-97.9 °F (36.6 °C)] 97.3 °F (36.3 °C)  Pulse:  [65-89] 74  Resp:  [8-21] 16  SpO2:  [97 %-100 %] 97 %  BP: (104-140)/(54-87) 122/73     Weight: 73.1 kg (161 lb 2.5 oz)  Body mass index is 28.55 kg/m².    Intake/Output - Last 3 Shifts       11/03 0700 - 11/04 0659 11/04 0700 - 11/05 0659 11/05 0700 - 11/06 0659    P.O. 960 720 120    I.V. (mL/kg)   100 (1.4)    Blood  487.9     Total Intake(mL/kg) 960 (13) 1207.9 (16.5) 220 (3)    Urine (mL/kg/hr) 600 (0.3) 980 (0.6) 100 (0.2)    Emesis/NG output   0    Stool 0 0 0    Total Output 600 980 100    Net +360 +227.9 +120           Urine Occurrence 2 x  1 x    Stool Occurrence 1 x 1 x 1 x    Emesis Occurrence   1 x          Physical Exam   Constitutional: She is oriented to person, place, and time. She appears well-developed.   Temporal and distal extremity muscle wasting   HENT:   Head: Normocephalic and atraumatic.   Eyes: EOM are normal. Pupils are equal, round, and reactive to light. Scleral icterus is present.   Neck: Normal range of motion.   Cardiovascular: Normal rate and regular rhythm.   No murmur heard.  Pulmonary/Chest: Effort normal and breath sounds normal. No respiratory distress. She has no wheezes.   Abdominal: Soft. Bowel sounds are normal. She exhibits distension. There is tenderness. There is no guarding.   Chevron incision w/ staples, CDI  Leakage from previous RLQ VALORIE site   Musculoskeletal: Normal range of motion. She exhibits edema (3+ BLE).   Neurological: She is alert and oriented to person, place, and time.   Skin: Skin is warm and dry.   Nursing note and vitals reviewed.      Laboratory:  Immunosuppressants         Stop Route Frequency     tacrolimus capsule 1 mg      -- Oral 2 times daily        CBC:   Recent Labs   Lab 11/05/18  0530   WBC 2.50*   RBC 2.86*   HGB 8.4*    HCT 26.2*   PLT 90*   MCV 92   MCH 29.4   MCHC 32.1     CMP:   Recent Labs   Lab 11/05/18  0530   *   CALCIUM 8.8   ALBUMIN 3.7   PROT 5.2*   *   K 3.7   CO2 22*   CL 98   BUN 80*   CREATININE 2.1*   ALKPHOS 91   ALT 12   AST 14   BILITOT 1.8*     Labs within the past 24 hours have been reviewed.    Diagnostic Results:  I have personally reviewed all pertinent imaging studies.    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   - POD 1 US: increased arterial resistive indices, suggestive of edema vs cute rejection vs congestion.  - Repeat US 10/9 with continued elevation of RIs.  - LFTs stable.  - T bili trending down.  - repeat liver US (10/11): elevated RIs.  - Liver US 11/6 to assess for ascites. No arterial flow within the liver allograft concerning for interval thrombosis/occlusion of the arterial system. Severe ascites seen.  - LFT remain stable. No CTA at this time due to EVA and normal liver function.   - Repeat Liver US today to reassess for arterial blood flow.      Hyponatremia    - Na 119 on admit.  - Na was improving post transplant w HD.  - Na stable at this time.              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  -Tx w/ Entecavir- dose changed to q72 hours given renal function.         Ascites    - paracentesis 10/1, 5L removed, no fluid sent for analysis.  - pt remains volume overloaded.   - Liver US 11/6 with severe ascites.   - Paracentesis 11/7 with 8.9 L removed. Wbc 39, segs 16%. Cultures pending.        Severe malnutrition    - dietary consulted.   - temporal and distal extremity muscle wasting and edema.  - encourage supplements and to increase nutritional intake.  - per nephrology, pt to be on low protein diet.   - Prealbumin reviewed.       Hypokalemia           Type 2 diabetes mellitus with diabetic polyneuropathy, with long-term current use of insulin    - continue home regimen.  - endocrine consulted.  - Pt off insulin drip at this time.  Apprec endo recs.        Leukopenia    - Stopped Cellcept, Bactrim, and Valcyte 10/31--> will resume once WBC improves and stable.   - Started Atovaquone 11/6.   - CMV PCR noted to be positive at 129. Will hold off on treatment with valcyte at this time. Repeat CMV PCR pending, 11/8.          Azotemia    - CRRT 10/31 for uremic toxin clearance.           Duodenal ulcer    - EGD 10/18- sig for ulcerated duodenal mucosa.   - Biopsy 10/18- no infection detected.  - Received 14 day course of amoxicillin/levo (completed 11/2) for potential H. Pylori.   - Cont protonix 40 BID.  - EGD 11/5. Results negative per GI.          UTI (urinary tract infection)    - Urine Cx 10/15 w/ Klebsiella pneumonia.  - Started Ciprofloxacin (10/17-10/19).   - Started on amoxicillin/levo for H. Pylori (completed abx 11/2).  - Pt asymptomatic at this time.       Acute blood loss anemia    - H&H cont to fluctuate.   - FOBT +.  - EGD 10/18 - showed diffuse bleeding with ulcerated duodenal mucosa.  - EGD 11/5 - improvement seen, no active bleeding.  - 1 unit PRBCs transfused 10/27 and 11/3.   - H&H with good response.   - Resume heparin sq injections 11/6.            Post LT - Acute renal failure    - Creatinine on arrival 0.8 and trended up.  - Nephrology consulted.  - Renal US (10/11): no hydronephrosis.  - Echo 10/12: diastolic dysfunction, likely 2/2 to ARF.  - CRRT 10/14; SLED 10/17, 10/20. --> no improvements noted in kidney fxn.  - 24 hr urine creatine collection: CrCl of 7.   - CRRT 10/31 with 1L removed.   - Lasix 40 BID (restarted 11/1).  - Lasix now on hold as of 11/3 per nephrology recs.     Hypervolemia associated with renal insufficiency    - CRRT 10/14, 10/17, 10/20, 10/31.  - CXR 10/30: abundant fluid in b/l pleural space.  -  Restarted Lasix 40 BID x 2 doses (11/2).  - Lasix now on hold per nephrology recs.        EVA (acute kidney injury)    - Cr 2.4 from 2.3.  - Albumin 25% 100 ml x 3 today.       Metabolic acidosis    - CRRT 10/14.  - SLED 10/17, 10/20, 10/31.         At risk for opportunistic infections    - Valcyte for CMV prophylaxis--> holding (10/31) for leukopenia. Pt will need weekly CMV PCR while discontinued. Next due, 11/8.  - Bactrim for PCP prophylaxis (MWF).--> holding (10/31) for leukopenia.   - Start Atovaquone 11/6.  - Fluconazole for fungal prophylaxis.         Long-term use of immunosuppressant medication    -  Prograf: stopped 10/29. Resumed 11/2.  --> Will monitor for signs of toxic side effects, check daily troughs, and change meds accordingly.  - Prednisone: stopped 10/29. Started on Hydrocortisone. pred taper resumed.  - Cellcept: stopped 10/31 for leukopenia. Restarted Cellcept 500 mg bid, 11/6.         Prophylactic immunotherapy    - See long term use immunosuppresion.       Weakness    - see physical deconditioning.       Physical deconditioning    - PT/OT consulted.  - Recommend home health PT and rolling walker at discharge (orders placed 11/6).       Hypotension    - continue Midodrine 15 mg TID. Monitor.       Anemia requiring transfusions    - Fluctuating H&H.    - Transfusions: 10/14; 10/16; 10/18; 10/19; 10/22; 10/27; 10/29, 10/31.  - FOBT +  - LDH elevated, Hapto < 10.  - EGD 10/18: ulcerated duodenal mucosa.   - Monitoring w/ CBC q12hr to assess the need for transfusion.  - H&H stable. No transfusion needed at this time.   - Consulted GI about this ongoing issue and further need of another EGD.  - EGD 11/5 unremarkable.              VTE Risk Mitigation (From admission, onward)        Ordered     heparin (porcine) injection 5,000 Units  Every 8 hours      11/06/18 1021     IP VTE HIGH RISK PATIENT  Once      11/05/18 1145     Place sequential compression device  Until discontinued      10/05/18 0709           The patients clinical status was discussed at multidisplinary rounds, involving transplant surgery, transplant medicine, pharmacy, nursing, nutrition, and social work    Discharge Planning: not a candidate for dc at this time.       Kimber Valle NP  Liver Transplant  [facility]

## 2018-11-08 NOTE — ASSESSMENT & PLAN NOTE
11/8/18: RECS: LASIXS 40 BID     Post-LT EVA is multifactorial in origin and has been related to the severity of liver disease, toxicity of immunosuppressive therapy and hepatic ischaemia reperfusion injury might be a driving force in the aetiology of post-LT EVA.      Plan: 11/08/18  -No indication for CRRT today   -Patient azotemia likely secondary to her H/H dropping from GI bleed  - 9L off from paracentesis but still very edematous in LE BL  - stable azotemia  No uremia  - we recommend lasix 40 BID

## 2018-11-08 NOTE — SUBJECTIVE & OBJECTIVE
Interval History: 9L off in paracentesis yesterday but pt continues to be very edematous in LE BL.     Review of patient's allergies indicates:  No Known Allergies  Current Facility-Administered Medications   Medication Frequency    albumin human 25% bottle 25 g Q8H    atovaquone suspension 1,500 mg Daily    bisacodyl EC tablet 10 mg Daily PRN    bisacodyl suppository 10 mg Daily PRN    calcium carbonate 200 mg calcium (500 mg) chewable tablet 500 mg TID PRN    dextrose 50% injection 12.5 g PRN    dextrose 50% injection 25 g PRN    docusate sodium capsule 100 mg Daily    entecavir tablet 0.5 mg Q72H    fluconazole tablet 200 mg Daily    glucagon (human recombinant) injection 1 mg PRN    glucose chewable tablet 16 g PRN    glucose chewable tablet 24 g PRN    heparin (porcine) injection 5,000 Units Q8H    insulin aspart U-100 pen 0-5 Units QID (AC + HS) PRN    insulin aspart U-100 pen 6-8 Units TIDWM    magnesium oxide tablet 800 mg BID    midodrine tablet 15 mg TID    mirtazapine tablet 7.5 mg QHS    mycophenolate capsule 500 mg BID    ondansetron disintegrating tablet 8 mg Q8H PRN    ondansetron injection 4 mg Q8H PRN    oxyCODONE immediate release tablet 5 mg Q4H PRN    oxyCODONE immediate release tablet Tab 10 mg Q4H PRN    pantoprazole EC tablet 40 mg BID    polyethylene glycol packet 17 g Daily PRN    polyethylene glycol packet 17 g Daily    predniSONE tablet 20 mg Daily    Followed by    [START ON 11/10/2018] predniSONE tablet 15 mg Daily    Followed by    [START ON 11/17/2018] predniSONE tablet 10 mg Daily    Followed by    [START ON 11/24/2018] predniSONE tablet 5 mg Daily    Followed by    [START ON 12/1/2018] predniSONE tablet 2.5 mg Daily    promethazine (PHENERGAN) 6.25 mg in dextrose 5 % 50 mL IVPB Q6H PRN    sodium chloride 0.9% flush 3 mL PRN    tacrolimus capsule 1 mg BID    ursodiol capsule 300 mg BID       Objective:     Vital Signs (Most Recent):  Temp: 97.6 °F  (36.4 °C) (11/08/18 1237)  Pulse: 89 (11/08/18 1142)  Resp: 13 (11/08/18 1142)  BP: 112/65 (11/08/18 0730)  SpO2: 100 % (11/08/18 0730)  O2 Device (Oxygen Therapy): room air (11/08/18 0730) Vital Signs (24h Range):  Temp:  [97.6 °F (36.4 °C)-97.8 °F (36.6 °C)] 97.6 °F (36.4 °C)  Pulse:  [69-96] 89  Resp:  [12-20] 13  SpO2:  [96 %-100 %] 100 %  BP: (100-154)/(59-95) 112/65     Weight: 67.4 kg (148 lb 9.4 oz) (11/08/18 0500)  Body mass index is 26.32 kg/m².  Body surface area is 1.73 meters squared.    I/O last 3 completed shifts:  In: 1370 [P.O.:1370]  Out: 275 [Urine:275]    Physical Exam   Constitutional: She is oriented to person, place, and time. She is cooperative. She has a sickly appearance. No distress.   HENT:   Head: Normocephalic and atraumatic.   Cardiovascular: Normal rate and regular rhythm.   Pulmonary/Chest: Effort normal.   Abdominal: She exhibits distension (less tense abdomen, less distension than yesterday, softer abdomen than yesterday ). There is no tenderness.   Neurological: She is alert and oriented to person, place, and time.   Skin: She is not diaphoretic.   Psychiatric: She has a normal mood and affect.   Nursing note and vitals reviewed.      Significant Labs:  CBC:   Recent Labs   Lab 11/08/18  0600   WBC 3.15*   RBC 2.88*   HGB 8.4*   HCT 26.2*   PLT 82*   MCV 91   MCH 29.2   MCHC 32.1     CMP:   Recent Labs   Lab 11/08/18  0600   GLU 84   CALCIUM 8.2*   ALBUMIN 2.7*   PROT 4.0*   *   K 4.1   CO2 24   CL 99   BUN 81*   CREATININE 2.4*   ALKPHOS 85   ALT 18   AST 20   BILITOT 1.6*     Coagulation:   Recent Labs   Lab 11/08/18  0600   INR 1.1     LFTs:   Recent Labs   Lab 11/08/18  0600   ALT 18   AST 20   ALKPHOS 85   BILITOT 1.6*   PROT 4.0*   ALBUMIN 2.7*     All labs within the past 24 hours have been reviewed.     Significant Imaging:  reviewed

## 2018-11-08 NOTE — PLAN OF CARE
Problem: Patient Care Overview  Goal: Plan of Care Review  Outcome: Ongoing (interventions implemented as appropriate)  -Pt AAOx4  -Vital signs stable  -BG monitoring ACHS, HS BG was 143  -Chevron incision JULIANE with steri strips  -PT went for para today; 9 L removed  -Pt with edema in bilateral lower extremities, TEDs on  -Central line dressing changed per pt's request  -Fall precautions maintained, non skid socks worn  - at bedside  -Bed lowered, locked, siderails up x2 and call bell in reach     See flowsheet for assessment findings.  Will continue to monitor pt.

## 2018-11-08 NOTE — PROGRESS NOTES
Pt w/ blood sugar 96 this am.  Pt refusing 6-8 units scheduled insulin at this time stating its too much insulin.  Endocrine notified.  Per Endocrine, orders to give 4 units if pt is agreeable.    Plan dicussed with pt. Pt okay with 4 units.  4 units insulin given with breakfast. No issues. Will monitor.

## 2018-11-08 NOTE — PROGRESS NOTES
TIMBO contacted [facility]# [phone], patient's . TMIBO was informed that Bobbi Maringouin is the patients caregiver from the community. She will assist the patient and pts spouse following discharge. Bobbi can be reached at [phone]. TIMBO remains available.

## 2018-11-08 NOTE — SUBJECTIVE & OBJECTIVE
"Interval HPI:   Overnight events: remains in TSU. BG at goal overnight. Hypoglycemia yesterday before dinner. Paracentesis yesterday afternoon. Discharge planning for next week.   Eatin%  Nausea: No  Hypoglycemia and intervention: Yes  improved to 83; ate dinner  Fever: No  TPN and/or TF: No    /65   Pulse 89   Temp 97.6 °F (36.4 °C) (Oral)   Resp 13   Ht 5' 3" (1.6 m)   Wt 67.4 kg (148 lb 9.4 oz)   LMP  (LMP Unknown)   SpO2 100%   Breastfeeding? No   BMI 26.32 kg/m²     Labs Reviewed and Include    Recent Labs   Lab 18  0600   GLU 84   CALCIUM 8.2*   ALBUMIN 2.7*   PROT 4.0*   *   K 4.1   CO2 24   CL 99   BUN 81*   CREATININE 2.4*   ALKPHOS 85   ALT 18   AST 20   BILITOT 1.6*     Lab Results   Component Value Date    WBC 3.15 (L) 2018    HGB 8.4 (L) 2018    HCT 26.2 (L) 2018    MCV 91 2018    PLT 82 (L) 2018     No results for input(s): TSH, FREET4 in the last 168 hours.  Lab Results   Component Value Date    HGBA1C 6.0 (H) 10/04/2018       Nutritional status:   Body mass index is 26.32 kg/m².  Lab Results   Component Value Date    ALBUMIN 2.7 (L) 2018    ALBUMIN 3.0 (L) 2018    ALBUMIN 3.0 (L) 2018     Lab Results   Component Value Date    PREALBUMIN 15 (L) 2018    PREALBUMIN 12 (L) 2018       Estimated Creatinine Clearance: 20.4 mL/min (A) (based on SCr of 2.4 mg/dL (H)).    Accu-Checks  Recent Labs     18  2043 18  0834 18  1225 18  1800 18  1800 18  1821 18  1841 18  1934 18  2031 18  0821   POCTGLUCOSE 242* 99 121* 58* 64* 54* 83 138* 143* 96       Current Medications and/or Treatments Impacting Glycemic Control  Immunotherapy:    Immunosuppressants         Stop Route Frequency     mycophenolate capsule 500 mg      -- Oral 2 times daily     tacrolimus capsule 1 mg      -- Oral 2 times daily        Steroids:   Hormones (From admission, onward)    " Start     Stop Route Frequency Ordered    12/01/18 0900  predniSONE tablet 2.5 mg      12/08 0859 Oral Daily 11/05/18 1004    11/24/18 0900  predniSONE tablet 5 mg      12/01 0859 Oral Daily 11/05/18 1004    11/17/18 0900  predniSONE tablet 10 mg      11/24 0859 Oral Daily 11/05/18 1004    11/10/18 0900  predniSONE tablet 15 mg      11/17 0859 Oral Daily 11/05/18 1004    11/06/18 0900  predniSONE tablet 20 mg      11/10 0859 Oral Daily 11/05/18 1004    10/17/18 0945  predniSONE tablet 15 mg      10/24 0859 Oral Daily 10/17/18 0943        Pressors:    Autonomic Drugs (From admission, onward)    Start     Stop Route Frequency Ordered    10/10/18 1500  midodrine tablet 15 mg      -- Oral 3 times daily 10/10/18 1122    10/06/18 1617  rocuronium (ZEMURON) 10 mg/mL injection     Comments:  Created by cabinet override    10/07 0429   10/06/18 1617        Hyperglycemia/Diabetes Medications:   Antihyperglycemics (From admission, onward)    Start     Stop Route Frequency Ordered    11/08/18 0715  insulin aspart U-100 pen 6-8 Units      -- SubQ 3 times daily with meals 11/07/18 1829    10/14/18 0925  insulin aspart U-100 pen 0-5 Units      -- SubQ Before meals & nightly PRN 10/14/18 0825    10/07/18 1200  insulin regular (Humulin R) 100 Units in sodium chloride 0.9% 100 mL infusion      10/13 2100 IV Continuous 10/07/18 1056

## 2018-11-08 NOTE — PROGRESS NOTES
TIMBO met with pt, pts  and daughter Gabriela and her family at bedside today with Lao  Yousuf.   Pt presents alert and oriented x 4, engaging with  engaged in conversation about dc planning and volunteer from community who will maybe come in to assist the patient at dc to [facility].   Pt denies coping issues, she does report that she is not ready for dc at ths time.  Yousuf will assist pt in speaking to community member about coming in for teaching tomorrow with pharmacy and transplant nurse coord.    TIMBO questioned if patient and  sufficient with self meds, SW discovered they have not been doing self meds at all, earlier after transplant, pts other children were learning self meds, but they have all returned to [place].     TIMBO spoke with bedside nurse Pratima, mid-level provider Kimber and tx nurse coord Indigo about plans to start self-meds today.    The Lao  will update Indigo tx nurse ovi about community member's willingness to come in for teaching with nurse coord and pharmacist tomorrow, as well as both teaching pt and pts  with .   Team with possible dc plans for early next week.   TIMBO updated Ursula with [facility] of dc plans for patient for next week.   TIMBO team will speak with  Katerin after 12 noon today about pts caregiver situation.     TIMBO remains available for all transplant resources, education, psychosocial support and assist with all dc planning needs.

## 2018-11-08 NOTE — PLAN OF CARE
Problem: Physical Therapy Goal  Goal: Physical Therapy Goal  Goals to be met by: 11/15/2018    Patient will increase functional independence with mobility by performin. Supine to sit with Contact Guard Assistance - not met  2. Sit to stand transfer with Supervision using LRAD or no AD - not met  3. Bed to chair transfer with Supervision using LRAD or no AD - not met  4. Gait  x 250 feet with Supervision using LRAD or no AD - not met  5. Stand for 3 minutes with Supervision to increase activity tolerance - not met   6. Lower extremity exercise program x15 reps per handout, with independence - not met       Outcome: Ongoing (interventions implemented as appropriate)  Goals updated for the next 8 days. Dari Pyle PT 18

## 2018-11-08 NOTE — ASSESSMENT & PLAN NOTE
BG goal 140-180.     Decrease Novolog to 6-8 units with meals; appetite remains variable each day depending on how she is feeling  Low dose correction scale given kidney function.  BG monitoring AC/HS    Discharge planning:  [place]

## 2018-11-08 NOTE — DISCHARGE SUMMARY
[facility]  Liver Transplant  Discharge Summary      Patient Name: Scarlett Reddy  MRN: [id number]  Admission Date: 10/1/2018  Hospital Length of Stay: 80 days  Discharge Date and Time:  2018 4:00 PM  Attending Physician: Yony Burns MD   Discharging Provider: Elizabeth Dean PA-C  Primary Care Provider: Primary Doctor No  Post-Operative Day: 34     ORGAN:   LIVER  Disease Etiology: Cirrhosis: Type B and D  Donor Type:    - Brain Death  CDC High Risk:   No  Donor CMV Status:   Donor CMV Status: Positive  Donor HBcAB:   Negative  Donor HCV Status:   Negative  Whole or Partial: Whole Liver  Biliary Anastomosis: End to End  Arterial Anatomy: Replaced Left Hepatic and Right Hepatic    Ms. 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 Post-op course notable for hypercapneic and hypoxic respiratory failure requiring brief reintubation. Liver bx (10/6) sig for Zone 3 hemorrhage and collapse, in addition to cholestasis.     Transferred from ICU on POD #4. ID consulted to comment on positive diphtheroid culture from ascitic fluid (likely skin colonization) as well as ESBL Klebsiella pneumoniae in urine culture (10/4).  Pt asymptomatic and cell count from ascitic fluid unremarkable. Per ID recs, no need to treat diphtheroids or asymptomatic ESBL Kleb pneumo bacteriuria though pt had 6 days of therapy which would cover these organisms.    Acute gradual worsening of renal function s/p OLTx, Nephrology consulted for post-op EVA. Creatinine on arrival 0.8 and trended up but pt remained with good UOP. Had stable response to Diuresis. Pt also with persistently elevated BUN, required multiple rounds of dialysis (10/15, 10/17, 10/20, 10/31), although not much improvement noted initially. Kidney function has since improved,  and Cr now returned to baseline. Nephrology signed off 11/26 and requested that patient follow up outpatient with them.    In addition, pt H/H cont to fluctuate requiring multiple blood transfusions (10/14; 10/16; 10/18; 10/19; 10/22; 10/27; 10/29, 10/31, 11/3, 11/21, 11/29, 12/4, 12/13, 12/19).  Pt reported dark stools 10/15 but color has normalized. FOBT+. EGD 10/18 consistent with ulcerated mucosa in duodenum s/p coagulation. She was started on Protonix 40mg and completed course of amoxicillin/levofloxacin on 11/2 for concern of H. Pylori. Biopsy negative for infection and CMV. Transfused as needed with goal Hb > 7.0.  Urine cx + Klebsiella pneumoniae (10/13). Received 3 day course of ciprofloxacin, dc'd 10/19. Patient finished 7 day course of Ertapenem for kleb pneumo ESBL UTI 11/29. Of note, pt grew same bacteria from wound 11/16. Purulent drainage noted from middle of chevron incision (11/16). Wound cx grew klebsiella p. CT scan showed no signs of infection. Area opened with wound vac placed 11/24, removed 12/7.  Pt now with 2 small healing areas of incision requiring twice weekly foam dressing changes, per wound care recs.     On 10/19 pm pt c/o crushing chest pain. Troponin 0.094, likely 2/2 ischemic stress. EKG NSR.     Liver US 11/6 showed no arterial flow. Repeat Liver US 11/8 showed arterial flow with increased RIs. 11/15 liver u/s showed abnormally decreased main hepatic artery peak systolic velocity with slow arterial flow and abnormal tardus parvus waveforms intrahepatically. Consulted vascular surgery. Repeat liver US 11/19 showed no arterial flow on R and sluggish flow on L. Hepatic angiogram 11/19 confirmed HAT. Liver US to assess recanalization post HAT 12/4 -> conclusion that pt may recanalize. Will continue to monitor with labs and repeat US one month post d/c with no further intervention planned at this time.    Pt with orthopnea. CT 11/26 showed large right and moderate left pleural effusions  with adjacent basilar atelectasis.  Chest xray and echo 12/9 with pleural effusions. Right thoracentesis with chest tube placement 12/11, fluid negative for infection. Chest tube removed, 12/13 w/o any issue. CXR obtained- no pneumo noted. Bilateral pleural effusion still present. Pt with continued worsening abdominal distention & LE edema. Multiple paracentesis (11/7, 11/12, 11/21- negative for infection) for ongoing ascites. 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 Milky appearance, cell count of fluid (51% lymphs), triglycerides 2208, consistent with chyle leak. Attempted low fat, high protein diet with no improvement seen in fluid character. 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 Peritoneal fluid 11/29 w/ Enterococcus. Per ID recs, treated with 10 day course of Vancomycin. Perc drain removed 12/17. TPN d/c'd 12/19 and regular diet resumed. Attempted paracentesis 11/20, but pt had insufficient ascites to tap. Pt still w/ distention and LE edema, but volume status much improved overall. Pt to be d/c'd on 20 mg Lasix daily.    Of note, Valcyte held 10/31 for leukopenia. CMV PCR noted to be positive at 129 on 11/1. Held off on treatment with valcyte, however weekly CMV PCRs increased (12/6 with 529 copies), started treatment dose 12/9/18. Last CMV PCR 12/13 < 35 copies. CMV 12/20 pending. Cont to monitor w/ weekly CMV PCR.    Also of note, Pt is Hep B/Delta +. Received HBIG weekly (last weekly dose 11/2). 12/3 Hep B surface antigen negative 12/3, surface AB quant 571, and Hep B DNA <10. Discussed with hepatologist this week, and decided to continue with monthly dosing as pt is not only Hep B+, but Hep B/Delta +. Dose given 12/20. Next dose due 1/20/19. Continue to monitor, per protocol.    Patient stable for discharge to the [facility] with HH/PT/OT at this time. Follow up to include labs Sat 12/22, Sun 12/23, labs + transplant clinic Monday 12/24. All other f/u appointments per  coordinator.     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, 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]  No    Drug-induced pancytopenia [D61.811] 11/12/2018 No    Thrombocytopenia, unspecified [D69.6] 11/09/2018 Yes    Leukopenia [D72.819] 10/31/2018 No    Long-term use of immunosuppressant medication [Z79.899] 10/09/2018 Not Applicable    At risk for opportunistic infections [Z91.89] 10/09/2018 No    Prophylactic immunotherapy [Z29.8] 10/05/2018 Not Applicable    Other ascites [R18.8] 08/30/2018 Yes    Physical deconditioning [R53.81] 08/30/2018 No    Anemia requiring transfusions [D64.9] 08/16/2018 Yes    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 [B18.0, K74.60] 08/15/2018 Yes    Type 2 diabetes mellitus with diabetic polyneuropathy, with long-term current use of insulin [E11.42, Z79.4] 08/15/2018 Not Applicable      Problems Resolved During this Admission:    Diagnosis Date Noted Date Resolved POA    Sleeps in sitting position due to orthopnea [R06.01] 12/09/2018 12/21/2018 Yes    Sequelae of hyperalimentation [E68] 12/04/2018 12/20/2018 No    Tachycardia [R00.0] 11/29/2018 12/08/2018 Clinically Undetermined    Chronic kidney disease, stage III (moderate) [N18.3] 11/26/2018 12/09/2018 No    Alteration in skin integrity due to moisture [R23.9] 11/23/2018 11/24/2018 Yes    Hypokalemia [E87.6] 11/20/2018 12/08/2018 No    Azotemia [R79.89] 10/30/2018 12/09/2018 No     Duodenal ulcer [K26.9] 10/19/2018 11/08/2018 No    UTI (urinary tract infection) [N39.0] 10/17/2018 12/09/2018 Yes    Acute blood loss anemia [D62] 10/16/2018 12/10/2018 No    EVA (acute kidney injury) [N17.9] 10/11/2018 12/08/2018 No     Post LT - Acute renal failure [N17.9] 10/11/2018 12/09/2018 No    Hypervolemia associated with renal insufficiency [E87.70, N28.9]  12/08/2018 No    Metabolic acidosis [E87.2] 10/09/2018 11/22/2018 No    Corticosteroids adverse reaction [T38.0X5A] 10/05/2018 12/09/2018 No    Chronic liver failure without hepatic coma [K72.10]  10/14/2018 Yes    Hyponatremia [E87.1]  11/24/2018 Yes    Hypotension [I95.9]  12/08/2018 Yes    Severe malnutrition [E43] 08/16/2018 12/20/2018 Yes    Liver transplant candidate [Z76.82]  10/10/2018 Not Applicable    Hepatic encephalopathy [K72.90] 08/14/2018 10/09/2018 Yes       Consults (From admission, onward)        Status Ordering Provider     Consult to Endocrinology  Once     Provider:  (Not yet assigned)    Completed JULIANNE ROCHE     Inpatient consult to Endocrinology  Once     Provider:  (Not yet assigned)    Completed SINDI YAÑEZ     Inpatient consult to Infectious Diseases  Once     Provider:  (Not yet assigned)    Completed ONEYDA HIRSCH     Inpatient consult to Infectious Diseases  Once     Provider:  (Not yet assigned)    Completed BROOKE JOHNSON     Inpatient consult to Midline team  Once     Provider:  (Not yet assigned)    Completed NAM ARRIETA     Inpatient consult to Midline team  Once     Provider:  (Not yet assigned)    Completed NAM ARRIETA     Inpatient consult to Nephrology  Once     Provider:  (Not yet assigned)    Completed FIDELIA MASTERSON     [facility]  Once     Provider:  (Not yet assigned)    Completed NAM ARRIETA     [facility]  Once     Provider:  (Not yet assigned)    Completed BROOKE JOHNSON     Inpatient consult to PICC team  ([facility])  Once     Provider:  (Not yet assigned)    Completed BROOKE JOHNSON     Inpatient consult to Registered Dietitian/Nutritionist  Once     Provider:  (Not yet assigned)    Completed FROY GONZALEZ     Inpatient consult to Registered Dietitian/Nutritionist  Once     Provider:  (Not yet assigned)    Completed SINDI YAÑEZ     Inpatient consult to Registered Dietitian/Nutritionist  Once     Provider:  (Not yet assigned)    Completed KIMBERLEY FRY     Inpatient consult to Registered Dietitian/Nutritionist  Once     Provider:  (Not yet assigned)    Completed FELI POLLACK     Inpatient consult to Registered Dietitian/Nutritionist  Once     Provider:  (Not yet assigned)    Completed NAM ARRIETA     Inpatient consult to Registered Dietitian/Nutritionist  Once     Provider:  (Not yet assigned)    Completed BROOKE JOHNSON     Inpatient consult to Vascular Surgery  Once     Provider:  (Not yet assigned)    Completed BROOKE JOHNSON          Pending Diagnostic Studies:     Procedure Component Value Units Date/Time    CBC auto differential [[id number]] Collected:  12/05/18 0526    Order Status:  Sent Lab Status:  In process Updated:  12/05/18 0526    Specimen:  Blood     CMV DNA, quantitative, PCR [[id number]] Collected:  12/20/18 0531    Order Status:  Sent Lab Status:  In process Updated:  12/20/18 0551    Specimen:  Blood     Comprehensive metabolic panel [[id number]] Collected:  12/05/18 0526    Order Status:  Sent Lab Status:  In process Updated:  12/05/18 0526    Specimen:  Blood     Freeze and Hold -BB HEP [[id number]] Collected:  10/05/18 0710    Order Status:  Sent Lab Status:  No result     Specimen:  Blood     Magnesium [[id number]] Collected:  12/05/18 0526    Order Status:  Sent Lab Status:  In process Updated:  12/05/18 0526    Specimen:  Blood     Phosphorus [[id number]] Collected:  12/05/18 0526    Order Status:  Sent Lab Status:  In process Updated:  12/05/18 0526    Specimen:  Blood      Tacrolimus level [[id number]] Collected:  12/05/18 0526    Order Status:  Sent Lab Status:  In process Updated:  12/05/18 0526    Specimen:  Blood         Significant Diagnostic Studies: Labs:   CMP   Recent Labs   Lab 12/20/18  0531 12/21/18  0608    137   K 3.7 3.7    102   CO2 27 26   * 199*   BUN 52* 52*   CREATININE 1.1 1.1   CALCIUM 8.2* 8.3*   PROT 4.8* 5.2*   ALBUMIN 2.6* 2.7*   BILITOT 1.3* 1.3*   ALKPHOS 206* 224*   AST 24 27   ALT 22 29   ANIONGAP 10 9   ESTGFRAFRICA 59.2* 59.2*   EGFRNONAA 51.3* 51.3*   , CBC   Recent Labs   Lab 12/20/18 0531 12/21/18  0608   WBC 2.84* 3.30*   HGB 8.0* 8.2*   HCT 25.1* 25.6*   PLT 95* 98*    and INR   Lab Results   Component Value Date    INR 0.9 11/15/2018    INR 1.0 11/14/2018    INR 1.0 11/13/2018       The patients clinical status was discussed at multidisplinary rounds, involving transplant surgery, transplant medicine, pharmacy, nursing, nutrition, and social work    Discharged Condition: good    Disposition: Home or Self Care    Follow Up:    Patient Instructions:      Referral to Home health   Referral Priority: Routine Referral Type: Home Health Care   Referral Reason: Specialty Services Required   Requested Specialty: Home Health Services   Number of Visits Requested: 1     Diet diabetic     Notify your health care provider if you experience any of the following:   Order Comments: Any concerning signs or symptoms     Notify your health care provider if you experience any of the following:  increased confusion or weakness     Notify your health care provider if you experience any of the following:  persistent dizziness, light-headedness, or visual disturbances     Notify your health care provider if you experience any of the following:  worsening rash     Notify your health care provider if you experience any of the following:  severe persistent headache     Notify your health care provider if you experience any of the following:   difficulty breathing or increased cough     Notify your health care provider if you experience any of the following:  redness, tenderness, or signs of infection (pain, swelling, redness, odor or green/yellow discharge around incision site)     Notify your health care provider if you experience any of the following:  severe uncontrolled pain     Notify your health care provider if you experience any of the following:  persistent nausea and vomiting or diarrhea     Notify your health care provider if you experience any of the following:  temperature >100.4     Activity as tolerated     Medications:  Reconciled Home Medications:      Medication List      START taking these medications    aspirin 81 MG Chew  Chew and swallow 1 tablet (81 mg total) by mouth once daily.     atovaquone 750 mg/5 mL Susp  Commonly known as:  MEPRON  Take 10 mLs (1,500 mg total) by mouth once daily. Stop: 4/3/19     furosemide 20 MG tablet  Commonly known as:  LASIX  Take 1 tablet (20 mg total) by mouth once daily.  Start taking on:  12/22/2018     magnesium oxide 400 mg (241.3 mg magnesium) tablet  Commonly known as:  MAG-OX  Take 2 tablets (800 mg total) by mouth 2 (two) times daily.     metoprolol tartrate 25 MG tablet  Commonly known as:  LOPRESSOR  Take 1 tablet (25 mg total) by mouth 2 (two) times daily.     pantoprazole 40 MG tablet  Commonly known as:  PROTONIX  Take 1 tablet (40 mg total) by mouth 2 (two) times daily.     tacrolimus 1 MG Cap  Commonly known as:  PROGRAF  Take 2 capsules (2 mg total) by mouth every 12 (twelve) hours.     valGANciclovir 450 mg Tab  Commonly known as:  VALCYTE  Take 1 tablet (450 mg total) by mouth 2 (two) times daily.        CHANGE how you take these medications    ergocalciferol 50,000 unit Cap  Commonly known as:  VITAMIN D2  Take 1 capsule (50,000 Units total) by mouth every 7 days.  What changed:    · medication strength  · how much to take  · when to take this     insulin aspart U-100 100 unit/mL  "Inpn pen  Commonly known as:  NovoLOG Flexpen U-100 Insulin  Inject 6 units into the skin three times daily plus low correction scale, max TDD 30 units daily  What changed:  additional instructions        CONTINUE taking these medications    ACCU-CHEK DC Strp  Generic drug:  blood sugar diagnostic  1 strip by Misc.(Non-Drug; Combo Route) route 3 (three) times daily.     BD ULTRA-FINE CITLALI PEN NEEDLE 32 gauge x 5/32" Ndle  Generic drug:  pen needle, diabetic  To use to inject insulin 4x daily     entecavir 0.5 MG Tab  Commonly known as:  BARACLUDE  Take 1 tablet (0.5 mg total) by mouth once daily.        STOP taking these medications    insulin glargine 100 unit/mL injection  Commonly known as:  LANTUS     lactulose 10 gram/15 mL solution  Commonly known as:  CHRONULAC     midodrine 5 MG Tab  Commonly known as:  PROAMATINE     OMEGA-3 FATTY ACIDS-EPA ORAL     sodium bicarbonate 650 MG tablet     XIFAXAN 550 mg Tab  Generic drug:  rifAXIMin          Time spent caring for patient (Greater than 1/2 spent in direct face-to-face contact): 900 minutes    Elizabeth Dean PA-C  Liver Transplant  [facility]  "

## 2018-11-08 NOTE — ASSESSMENT & PLAN NOTE
Managed per primary team  Avoid hypoglycemia    Recent Labs   Lab 11/08/18  0600   ALT 18   AST 20   ALKPHOS 85   BILITOT 1.6*   PROT 4.0*   ALBUMIN 2.7*

## 2018-11-08 NOTE — PROGRESS NOTES
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 Post-op course c/b hypercapneic and hypoxic respiratory failure and was reintubated though quickly extubated now doing well. Liver bx (10/6) sig for Zone 3 hemorrhage and collapse, in addition to cholestasis. Transferred from ICU on POD #4. ID consulted to comment on positive diphtheroid culture from ascitic fluid (likely skin colonization) as well as ESBL Klebsiella pneumoniae in urine culture (10/4).  Pt asymptomatic and cell count from ascitic fluid unremarkable. Per ID recs, no need to treat diphtheroids or asymptomatic ESBL Kleb pneumo bacteriuria though pt has had 6 days of therapy which would cover these organisms. Mild peritransplant ascites- repeat US 10/6 with increased arterial resistive indices. Repeat US 10/9 w/ continued elevation in RIs and fluid collections. LFTs trending down nicely.      Acute gradual worsening of renal function s/p OLTx.Creatinine on arrival 0.8 and has been up trending. Nephrology consulted for post-op EVA. Cr cont to trend up but remains to have good UOP. Had stable response to lasiz diuresis.  BUN elevated requiring multiple rounds of dialysis (10/15, 10/17, 10/20, 10/31). No significant improvements in kidney function noted.   In addition, pt H/H cont to fluctuate requiring multiple blood transfusions (10/14, 10/16, 10/18, 10/19, 10/22, 10/27, 10/29).  Pt reported dark stools 10/15 but color has normalized. FOBT+. EGD 10/18 consistent with ulcerated mucosa in duodenum s/p  coagulation. Concern for H. Pylori. Started Amoxicillin/levofloxacin (10/19, complete 11/2)). Remains on Protonix 40 mg BID. Biopsy negative for infection and CMV. Will cont close monitoring H/H, transfuse as needed with goal Hb > 7.0.  Of note, Urine cx + Klebsiella pneumoniae (10/13). Received 3 day course of ciprofloxacin, dc'd 10/19.   On 10/19 pm pt c/o crushing chest pain. Troponin 0.094, likely 2/2 ischemic stress. EKG NSR.      11/5/18 - no acute events overnight. Pt reports feeling well this am. One unit of PRBCs given 11/3 with good response. Cr level noted with only minimal increases. Lasix placed on hold per nephrology recs. Will plan to hydrate today w/ albumin. EGD 11/5 unimpressive for overt bleed. Pt remains on protonix 40 BID. Denies melena. Cont to hold heparin. WBC cont to decrease. Immunosuppression managed with hydrocortisone 50 (cont to hold Cellcept, bactrim, valcyte). Prograf now restarted and transitioned IV steroids to PO prednisone on 11/3. CMV PCR noted with 129. Cont to monitor.     Interval History: 9L off in paracentesis yesterday but pt continues to be very edematous in LE BL.     Review of patient's allergies indicates:  No Known Allergies  Current Facility-Administered Medications   Medication Frequency    albumin human 25% bottle 25 g Q8H    atovaquone suspension 1,500 mg Daily    bisacodyl EC tablet 10 mg Daily PRN    bisacodyl suppository 10 mg Daily PRN    calcium carbonate 200 mg calcium (500 mg) chewable tablet 500 mg TID PRN    dextrose 50% injection 12.5 g PRN    dextrose 50% injection 25 g PRN    docusate sodium capsule 100 mg Daily    entecavir tablet 0.5 mg Q72H    fluconazole tablet 200 mg Daily    glucagon (human recombinant) injection 1 mg PRN    glucose chewable tablet 16 g PRN    glucose chewable tablet 24 g PRN    heparin (porcine) injection 5,000 Units Q8H    insulin aspart U-100 pen 0-5 Units QID (AC + HS) PRN    insulin aspart U-100 pen 6-8 Units  TIDWM    magnesium oxide tablet 800 mg BID    midodrine tablet 15 mg TID    mirtazapine tablet 7.5 mg QHS    mycophenolate capsule 500 mg BID    ondansetron disintegrating tablet 8 mg Q8H PRN    ondansetron injection 4 mg Q8H PRN    oxyCODONE immediate release tablet 5 mg Q4H PRN    oxyCODONE immediate release tablet Tab 10 mg Q4H PRN    pantoprazole EC tablet 40 mg BID    polyethylene glycol packet 17 g Daily PRN    polyethylene glycol packet 17 g Daily    predniSONE tablet 20 mg Daily    Followed by    [START ON 11/10/2018] predniSONE tablet 15 mg Daily    Followed by    [START ON 11/17/2018] predniSONE tablet 10 mg Daily    Followed by    [START ON 11/24/2018] predniSONE tablet 5 mg Daily    Followed by    [START ON 12/1/2018] predniSONE tablet 2.5 mg Daily    promethazine (PHENERGAN) 6.25 mg in dextrose 5 % 50 mL IVPB Q6H PRN    sodium chloride 0.9% flush 3 mL PRN    tacrolimus capsule 1 mg BID    ursodiol capsule 300 mg BID       Objective:     Vital Signs (Most Recent):  Temp: 97.6 °F (36.4 °C) (11/08/18 1237)  Pulse: 89 (11/08/18 1142)  Resp: 13 (11/08/18 1142)  BP: 112/65 (11/08/18 0730)  SpO2: 100 % (11/08/18 0730)  O2 Device (Oxygen Therapy): room air (11/08/18 0730) Vital Signs (24h Range):  Temp:  [97.6 °F (36.4 °C)-97.8 °F (36.6 °C)] 97.6 °F (36.4 °C)  Pulse:  [69-96] 89  Resp:  [12-20] 13  SpO2:  [96 %-100 %] 100 %  BP: (100-154)/(59-95) 112/65     Weight: 67.4 kg (148 lb 9.4 oz) (11/08/18 0500)  Body mass index is 26.32 kg/m².  Body surface area is 1.73 meters squared.    I/O last 3 completed shifts:  In: 1370 [P.O.:1370]  Out: 275 [Urine:275]    Physical Exam   Constitutional: She is oriented to person, place, and time. She is cooperative. She has a sickly appearance. No distress.   HENT:   Head: Normocephalic and atraumatic.   Cardiovascular: Normal rate and regular rhythm.   Pulmonary/Chest: Effort normal.   Abdominal: She exhibits distension (less tense abdomen, less distension  than yesterday, softer abdomen than yesterday ). There is no tenderness.   Neurological: She is alert and oriented to person, place, and time.   Skin: She is not diaphoretic.   Psychiatric: She has a normal mood and affect.   Nursing note and vitals reviewed.      Significant Labs:  CBC:   Recent Labs   Lab 11/08/18  0600   WBC 3.15*   RBC 2.88*   HGB 8.4*   HCT 26.2*   PLT 82*   MCV 91   MCH 29.2   MCHC 32.1     CMP:   Recent Labs   Lab 11/08/18  0600   GLU 84   CALCIUM 8.2*   ALBUMIN 2.7*   PROT 4.0*   *   K 4.1   CO2 24   CL 99   BUN 81*   CREATININE 2.4*   ALKPHOS 85   ALT 18   AST 20   BILITOT 1.6*     Coagulation:   Recent Labs   Lab 11/08/18  0600   INR 1.1     LFTs:   Recent Labs   Lab 11/08/18  0600   ALT 18   AST 20   ALKPHOS 85   BILITOT 1.6*   PROT 4.0*   ALBUMIN 2.7*     All labs within the past 24 hours have been reviewed.     Significant Imaging:  reviewed     Assessment/Plan:      Post LT - Acute renal failure    11/8/18: RECS: LASIXS 40 BID     Post-LT EVA is multifactorial in origin and has been related to the severity of liver disease, toxicity of immunosuppressive therapy and hepatic ischaemia reperfusion injury might be a driving force in the aetiology of post-LT EVA.      Plan: 11/08/18  -No indication for CRRT today   -Patient azotemia likely secondary to her H/H dropping from GI bleed  - 9L off from paracentesis but still very edematous in LE BL  - stable azotemia  No uremia  - we recommend lasix 40 BID                    Thank you for your consult.    Charles Benitez MD  Nephrology  [facility]    ATTENDING PHYSICIAN ATTESTATION  I have personally interviewed and examined the patient. I thoroughly reviewed the demographic, clinical, laboratorial and imaging information available in medical records. I agree with the assessment and recommendations provided by the subspecialty resident. Dr. Benitez was under my supervision.

## 2018-11-09 PROBLEM — D69.6 THROMBOCYTOPENIA, UNSPECIFIED: Status: ACTIVE | Noted: 2018-11-09

## 2018-11-09 PROBLEM — R10.84 GENERALIZED ABDOMINAL PAIN: Status: RESOLVED | Noted: 2018-09-10 | Resolved: 2018-11-09

## 2018-11-09 PROBLEM — E87.6 HYPOKALEMIA: Status: RESOLVED | Noted: 2018-09-20 | Resolved: 2018-11-09

## 2018-11-09 LAB
ALBUMIN SERPL BCP-MCNC: 3 G/DL
ALP SERPL-CCNC: 74 U/L
ALT SERPL W/O P-5'-P-CCNC: 13 U/L
ANION GAP SERPL CALC-SCNC: 11 MMOL/L
AST SERPL-CCNC: 17 U/L
BASOPHILS # BLD AUTO: 0.01 K/UL
BASOPHILS NFR BLD: 0.3 %
BILIRUB SERPL-MCNC: 1.5 MG/DL
BUN SERPL-MCNC: 92 MG/DL
CALCIUM SERPL-MCNC: 8.4 MG/DL
CHLORIDE SERPL-SCNC: 100 MMOL/L
CO2 SERPL-SCNC: 22 MMOL/L
CREAT SERPL-MCNC: 2.4 MG/DL
DIFFERENTIAL METHOD: ABNORMAL
EOSINOPHIL # BLD AUTO: 0 K/UL
EOSINOPHIL NFR BLD: 0.3 %
ERYTHROCYTE [DISTWIDTH] IN BLOOD BY AUTOMATED COUNT: 17.4 %
EST. GFR  (AFRICAN AMERICAN): 23 ML/MIN/1.73 M^2
EST. GFR  (NON AFRICAN AMERICAN): 20 ML/MIN/1.73 M^2
GLUCOSE SERPL-MCNC: 185 MG/DL
HCT VFR BLD AUTO: 25.6 %
HGB BLD-MCNC: 8.4 G/DL
IMM GRANULOCYTES # BLD AUTO: 0.09 K/UL
IMM GRANULOCYTES NFR BLD AUTO: 2.6 %
INR PPP: 1.1
LYMPHOCYTES # BLD AUTO: 0.2 K/UL
LYMPHOCYTES NFR BLD: 5.5 %
MAGNESIUM SERPL-MCNC: 2.2 MG/DL
MCH RBC QN AUTO: 30.2 PG
MCHC RBC AUTO-ENTMCNC: 32.8 G/DL
MCV RBC AUTO: 92 FL
MONOCYTES # BLD AUTO: 0.4 K/UL
MONOCYTES NFR BLD: 11 %
NEUTROPHILS # BLD AUTO: 2.8 K/UL
NEUTROPHILS NFR BLD: 80.3 %
NRBC BLD-RTO: 0 /100 WBC
PHOSPHATE SERPL-MCNC: 3.7 MG/DL
PLATELET # BLD AUTO: 75 K/UL
PMV BLD AUTO: 10.4 FL
POCT GLUCOSE: 153 MG/DL (ref 70–110)
POCT GLUCOSE: 167 MG/DL (ref 70–110)
POCT GLUCOSE: 181 MG/DL (ref 70–110)
POCT GLUCOSE: 301 MG/DL (ref 70–110)
POCT GLUCOSE: 304 MG/DL (ref 70–110)
POCT GLUCOSE: 307 MG/DL (ref 70–110)
POTASSIUM SERPL-SCNC: 4.2 MMOL/L
PROT SERPL-MCNC: 4.1 G/DL
PROTHROMBIN TIME: 11.2 SEC
RBC # BLD AUTO: 2.78 M/UL
SODIUM SERPL-SCNC: 133 MMOL/L
TACROLIMUS BLD-MCNC: 7.7 NG/ML
WBC # BLD AUTO: 3.47 K/UL

## 2018-11-09 PROCEDURE — 20600001 HC STEP DOWN PRIVATE ROOM

## 2018-11-09 PROCEDURE — 25000003 PHARM REV CODE 250: Performed by: TRANSPLANT SURGERY

## 2018-11-09 PROCEDURE — 83735 ASSAY OF MAGNESIUM: CPT

## 2018-11-09 PROCEDURE — 25000003 PHARM REV CODE 250: Performed by: PHYSICIAN ASSISTANT

## 2018-11-09 PROCEDURE — P9047 ALBUMIN (HUMAN), 25%, 50ML: HCPCS | Mod: JG | Performed by: NURSE PRACTITIONER

## 2018-11-09 PROCEDURE — 99233 SBSQ HOSP IP/OBS HIGH 50: CPT | Mod: 24,,, | Performed by: PHYSICIAN ASSISTANT

## 2018-11-09 PROCEDURE — 84100 ASSAY OF PHOSPHORUS: CPT

## 2018-11-09 PROCEDURE — 25000003 PHARM REV CODE 250: Performed by: NURSE PRACTITIONER

## 2018-11-09 PROCEDURE — 63600175 PHARM REV CODE 636 W HCPCS: Performed by: NURSE PRACTITIONER

## 2018-11-09 PROCEDURE — 94664 DEMO&/EVAL PT USE INHALER: CPT

## 2018-11-09 PROCEDURE — 80053 COMPREHEN METABOLIC PANEL: CPT

## 2018-11-09 PROCEDURE — 85610 PROTHROMBIN TIME: CPT

## 2018-11-09 PROCEDURE — 80197 ASSAY OF TACROLIMUS: CPT

## 2018-11-09 PROCEDURE — 99900035 HC TECH TIME PER 15 MIN (STAT)

## 2018-11-09 PROCEDURE — 85025 COMPLETE CBC W/AUTO DIFF WBC: CPT

## 2018-11-09 PROCEDURE — 99231 SBSQ HOSP IP/OBS SF/LOW 25: CPT | Mod: ,,, | Performed by: INTERNAL MEDICINE

## 2018-11-09 PROCEDURE — 97116 GAIT TRAINING THERAPY: CPT

## 2018-11-09 RX ORDER — INSULIN ASPART 100 [IU]/ML
8-10 INJECTION, SOLUTION INTRAVENOUS; SUBCUTANEOUS
Status: DISCONTINUED | OUTPATIENT
Start: 2018-11-10 | End: 2018-11-17

## 2018-11-09 RX ORDER — FUROSEMIDE 40 MG/1
40 TABLET ORAL 2 TIMES DAILY
Status: DISCONTINUED | OUTPATIENT
Start: 2018-11-09 | End: 2018-11-10

## 2018-11-09 RX ADMIN — INSULIN ASPART 4 UNITS: 100 INJECTION, SOLUTION INTRAVENOUS; SUBCUTANEOUS at 12:11

## 2018-11-09 RX ADMIN — MIDODRINE HYDROCHLORIDE 15 MG: 5 TABLET ORAL at 03:11

## 2018-11-09 RX ADMIN — MIRTAZAPINE 7.5 MG: 7.5 TABLET ORAL at 09:11

## 2018-11-09 RX ADMIN — INSULIN ASPART 8 UNITS: 100 INJECTION, SOLUTION INTRAVENOUS; SUBCUTANEOUS at 08:11

## 2018-11-09 RX ADMIN — FUROSEMIDE 40 MG: 40 TABLET ORAL at 08:11

## 2018-11-09 RX ADMIN — ASPIRIN 81 MG CHEWABLE TABLET 81 MG: 81 TABLET CHEWABLE at 08:11

## 2018-11-09 RX ADMIN — MAGNESIUM OXIDE TAB 400 MG (241.3 MG ELEMENTAL MG) 800 MG: 400 (241.3 MG) TAB at 08:11

## 2018-11-09 RX ADMIN — MIDODRINE HYDROCHLORIDE 15 MG: 5 TABLET ORAL at 09:11

## 2018-11-09 RX ADMIN — TACROLIMUS 1 MG: 1 CAPSULE ORAL at 08:11

## 2018-11-09 RX ADMIN — INSULIN ASPART 4 UNITS: 100 INJECTION, SOLUTION INTRAVENOUS; SUBCUTANEOUS at 06:11

## 2018-11-09 RX ADMIN — TACROLIMUS 1 MG: 1 CAPSULE ORAL at 06:11

## 2018-11-09 RX ADMIN — MYCOPHENOLATE MOFETIL 500 MG: 250 CAPSULE ORAL at 09:11

## 2018-11-09 RX ADMIN — FUROSEMIDE 40 MG: 40 TABLET ORAL at 06:11

## 2018-11-09 RX ADMIN — MAGNESIUM OXIDE TAB 400 MG (241.3 MG ELEMENTAL MG) 800 MG: 400 (241.3 MG) TAB at 09:11

## 2018-11-09 RX ADMIN — PANTOPRAZOLE SODIUM 40 MG: 40 TABLET, DELAYED RELEASE ORAL at 09:11

## 2018-11-09 RX ADMIN — URSODIOL 300 MG: 300 CAPSULE ORAL at 09:11

## 2018-11-09 RX ADMIN — URSODIOL 300 MG: 300 CAPSULE ORAL at 08:11

## 2018-11-09 RX ADMIN — INSULIN ASPART 8 UNITS: 100 INJECTION, SOLUTION INTRAVENOUS; SUBCUTANEOUS at 06:11

## 2018-11-09 RX ADMIN — MYCOPHENOLATE MOFETIL 500 MG: 250 CAPSULE ORAL at 08:11

## 2018-11-09 RX ADMIN — HEPARIN SODIUM 5000 UNITS: 5000 INJECTION, SOLUTION INTRAVENOUS; SUBCUTANEOUS at 09:11

## 2018-11-09 RX ADMIN — PREDNISONE 20 MG: 10 TABLET ORAL at 08:11

## 2018-11-09 RX ADMIN — INSULIN ASPART 8 UNITS: 100 INJECTION, SOLUTION INTRAVENOUS; SUBCUTANEOUS at 12:11

## 2018-11-09 RX ADMIN — INSULIN ASPART 2 UNITS: 100 INJECTION, SOLUTION INTRAVENOUS; SUBCUTANEOUS at 09:11

## 2018-11-09 RX ADMIN — FLUCONAZOLE 200 MG: 200 TABLET ORAL at 08:11

## 2018-11-09 RX ADMIN — PANTOPRAZOLE SODIUM 40 MG: 40 TABLET, DELAYED RELEASE ORAL at 08:11

## 2018-11-09 RX ADMIN — ALBUMIN HUMAN 25 G: 0.25 SOLUTION INTRAVENOUS at 05:11

## 2018-11-09 RX ADMIN — MIDODRINE HYDROCHLORIDE 15 MG: 5 TABLET ORAL at 08:11

## 2018-11-09 RX ADMIN — HEPARIN SODIUM 5000 UNITS: 5000 INJECTION, SOLUTION INTRAVENOUS; SUBCUTANEOUS at 05:11

## 2018-11-09 RX ADMIN — ATOVAQUONE 1500 MG: 750 SUSPENSION ORAL at 08:11

## 2018-11-09 RX ADMIN — HEPARIN SODIUM 5000 UNITS: 5000 INJECTION, SOLUTION INTRAVENOUS; SUBCUTANEOUS at 02:11

## 2018-11-09 NOTE — SUBJECTIVE & OBJECTIVE
"Interval HPI:   Overnight events: remains in TSU. BG above goal overnight and this AM. Prednisone tapered to 15 mg daily; steroid taper. Discharge planning  Eatin-  100%  Nausea: No  Hypoglycemia and intervention: No  Fever: No  TPN and/or TF: No      /70   Pulse 84   Temp 97.9 °F (36.6 °C) (Oral)   Resp 13   Ht 5' 3" (1.6 m)   Wt 68.4 kg (150 lb 12.7 oz)   LMP  (LMP Unknown)   SpO2 99%   Breastfeeding? No   BMI 26.71 kg/m²     Labs Reviewed and Include    Recent Labs   Lab 11/10/18  0600   *   CALCIUM 8.5*   ALBUMIN 3.4*   PROT 4.9*   *   K 4.8   CO2 21*   CL 98   BUN 97*   CREATININE 2.6*   ALKPHOS 116   ALT 18   AST 21   BILITOT 1.7*     Lab Results   Component Value Date    WBC 7.20 11/10/2018    HGB 10.2 (L) 11/10/2018    HCT 31.1 (L) 11/10/2018    MCV 91 11/10/2018     (L) 11/10/2018     No results for input(s): TSH, FREET4 in the last 168 hours.  Lab Results   Component Value Date    HGBA1C 6.0 (H) 10/04/2018       Nutritional status:   Body mass index is 26.71 kg/m².  Lab Results   Component Value Date    ALBUMIN 3.4 (L) 11/10/2018    ALBUMIN 3.0 (L) 2018    ALBUMIN 2.7 (L) 2018     Lab Results   Component Value Date    PREALBUMIN 15 (L) 2018    PREALBUMIN 12 (L) 2018       Estimated Creatinine Clearance: 19 mL/min (A) (based on SCr of 2.6 mg/dL (H)).    Accu-Checks  Recent Labs     18  1934 18  2031 18  0821 18  1237 18  1659 18  2104 18  0825 18  1208 18  1803 18  2112   POCTGLUCOSE 138* 143* 96 153* 167* 157* 181* 304* 301* 307*       Current Medications and/or Treatments Impacting Glycemic Control  Immunotherapy:    Immunosuppressants         Stop Route Frequency     mycophenolate capsule 500 mg      -- Oral 2 times daily     tacrolimus capsule 1 mg      -- Oral 2 times daily        Steroids:   Hormones (From admission, onward)    Start     Stop Route Frequency Ordered    18 " 0900  predniSONE tablet 2.5 mg      12/08 0859 Oral Daily 11/05/18 1004    11/24/18 0900  predniSONE tablet 5 mg      12/01 0859 Oral Daily 11/05/18 1004    11/17/18 0900  predniSONE tablet 10 mg      11/24 0859 Oral Daily 11/05/18 1004    11/10/18 0900  predniSONE tablet 15 mg      11/17 0859 Oral Daily 11/05/18 1004    10/17/18 0945  predniSONE tablet 15 mg      10/24 0859 Oral Daily 10/17/18 0943        Pressors:    Autonomic Drugs (From admission, onward)    Start     Stop Route Frequency Ordered    10/10/18 1500  midodrine tablet 15 mg      -- Oral 3 times daily 10/10/18 1122    10/06/18 1617  rocuronium (ZEMURON) 10 mg/mL injection     Comments:  Created by cabinet override    10/07 0429   10/06/18 1617        Hyperglycemia/Diabetes Medications:   Antihyperglycemics (From admission, onward)    Start     Stop Route Frequency Ordered    11/10/18 0715  insulin aspart U-100 pen 8-10 Units      -- SubQ 3 times daily with meals 11/09/18 1906    10/14/18 0925  insulin aspart U-100 pen 0-5 Units      -- SubQ Before meals & nightly PRN 10/14/18 0825    10/07/18 1200  insulin regular (Humulin R) 100 Units in sodium chloride 0.9% 100 mL infusion      10/13 2100 IV Continuous 10/07/18 1059

## 2018-11-09 NOTE — ASSESSMENT & PLAN NOTE
11/8/18: RECS: LASIXS 40 BID     Post-LT EVA is multifactorial in origin and has been related to the severity of liver disease, toxicity of immunosuppressive therapy and hepatic ischaemia reperfusion injury might be a driving force in the aetiology of post-LT EVA.      Plan: 11/09/18  -No indication for CRRT today   -Patient azotemia likely secondary to her H/H dropping from GI bleed  - 9L off from paracentesis but still very edematous in LE BL  - stable azotemia  No uremia  - we continue recommend lasix 40 BID

## 2018-11-09 NOTE — PROGRESS NOTES
Dunia informed TIMBO that patient was requesting 3-in-1 bedside commode, and a rolling walker. Per Dunia, patient preferred the rolling walker over the rollator that she is borrowing from the [employer]. TIMBO contacted Diane via email, with [employer], and was informed that patient has to self-pay for all medical equipment. TIMBO informed Dunia of the above. Dunia stated that she will inform patient and .     TIMBO contacted [employer] to inform them that patient is scheduled to discharge next week. Per Dunia, it was reported to her that the patient's room at [facility] will require cleaning before patient returns. TIMBO remains available.

## 2018-11-09 NOTE — SUBJECTIVE & OBJECTIVE
Interval History: NAEON. NAD. Cr stable. Lasix started today    Review of patient's allergies indicates:  No Known Allergies  Current Facility-Administered Medications   Medication Frequency    aspirin chewable tablet 81 mg Daily    atovaquone suspension 1,500 mg Daily    bisacodyl EC tablet 10 mg Daily PRN    bisacodyl suppository 10 mg Daily PRN    calcium carbonate 200 mg calcium (500 mg) chewable tablet 500 mg TID PRN    dextrose 50% injection 12.5 g PRN    dextrose 50% injection 25 g PRN    docusate sodium capsule 100 mg Daily    entecavir tablet 0.5 mg Q72H    fluconazole tablet 200 mg Daily    furosemide tablet 40 mg BID    glucagon (human recombinant) injection 1 mg PRN    glucose chewable tablet 16 g PRN    glucose chewable tablet 24 g PRN    heparin (porcine) injection 5,000 Units Q8H    insulin aspart U-100 pen 0-5 Units QID (AC + HS) PRN    insulin aspart U-100 pen 6-8 Units TIDWM    magnesium oxide tablet 800 mg BID    midodrine tablet 15 mg TID    mirtazapine tablet 7.5 mg QHS    mycophenolate capsule 500 mg BID    ondansetron disintegrating tablet 8 mg Q8H PRN    ondansetron injection 4 mg Q8H PRN    oxyCODONE immediate release tablet 5 mg Q4H PRN    oxyCODONE immediate release tablet Tab 10 mg Q4H PRN    pantoprazole EC tablet 40 mg BID    polyethylene glycol packet 17 g Daily PRN    polyethylene glycol packet 17 g Daily    [START ON 11/10/2018] predniSONE tablet 15 mg Daily    Followed by    [START ON 11/17/2018] predniSONE tablet 10 mg Daily    Followed by    [START ON 11/24/2018] predniSONE tablet 5 mg Daily    Followed by    [START ON 12/1/2018] predniSONE tablet 2.5 mg Daily    promethazine (PHENERGAN) 6.25 mg in dextrose 5 % 50 mL IVPB Q6H PRN    sodium chloride 0.9% flush 3 mL PRN    tacrolimus capsule 1 mg BID    ursodiol capsule 300 mg BID       Objective:     Vital Signs (Most Recent):  Temp: 97.5 °F (36.4 °C) (11/09/18 0800)  Pulse: 109 (11/09/18  0800)  Resp: (!) 21 (11/09/18 0800)  BP: 107/66 (11/09/18 0800)  SpO2: 96 % (11/09/18 0800)  O2 Device (Oxygen Therapy): room air (11/08/18 2150) Vital Signs (24h Range):  Temp:  [97.5 °F (36.4 °C)-97.6 °F (36.4 °C)] 97.5 °F (36.4 °C)  Pulse:  [] 109  Resp:  [10-21] 21  SpO2:  [95 %-100 %] 96 %  BP: ()/() 107/66     Weight: 68.3 kg (150 lb 9.2 oz) (11/09/18 0500)  Body mass index is 26.67 kg/m².  Body surface area is 1.74 meters squared.    I/O last 3 completed shifts:  In: 1590 [P.O.:1590]  Out: 1025 [Urine:1025]    Physical Exam   Constitutional: She is oriented to person, place, and time. No distress.   HENT:   Head: Normocephalic and atraumatic.   Cardiovascular: Normal rate and regular rhythm.   Pulmonary/Chest: Effort normal. No respiratory distress. She has decreased breath sounds.   Abdominal: Soft. She exhibits distension.   Musculoskeletal: She exhibits edema. She exhibits no tenderness.   Neurological: She is alert and oriented to person, place, and time.   Skin: Skin is warm and dry. She is not diaphoretic.   Psychiatric: She has a normal mood and affect. Her behavior is normal.       Significant Labs:  CBC:   Recent Labs   Lab 11/09/18  0558   WBC 3.47*   RBC 2.78*   HGB 8.4*   HCT 25.6*   PLT 75*   MCV 92   MCH 30.2   MCHC 32.8     CMP:   Recent Labs   Lab 11/09/18  0558   *   CALCIUM 8.4*   ALBUMIN 3.0*   PROT 4.1*   *   K 4.2   CO2 22*      BUN 92*   CREATININE 2.4*   ALKPHOS 74   ALT 13   AST 17   BILITOT 1.5*     Coagulation:   Recent Labs   Lab 11/09/18  0558   INR 1.1     LFTs:   Recent Labs   Lab 11/09/18  0558   ALT 13   AST 17   ALKPHOS 74   BILITOT 1.5*   PROT 4.1*   ALBUMIN 3.0*     All labs within the past 24 hours have been reviewed.     Significant Imaging:  reviewed

## 2018-11-09 NOTE — PLAN OF CARE
Problem: Patient Care Overview  Goal: Plan of Care Review  Outcome: Ongoing (interventions implemented as appropriate)  -Pt AAOx4  -Vital signs stable  -BG monitoring ACHS, HS BG was 157  -Chevron incision JULIANE with steri strips  -Pt with edema in bilateral lower extremities, TEDs on  -Fall precautions maintained, non skid socks worn  - at bedside  -Bed lowered, locked, siderails up x2 and call bell in reach     See flowsheet for assessment findings.  Will continue to monitor pt.

## 2018-11-09 NOTE — PLAN OF CARE
Problem: Physical Therapy Goal  Goal: Physical Therapy Goal  Goals to be met by: 2018    Patient will increase functional independence with mobility by performin. Supine to sit with Contact Guard Assistance - not met  2. Sit to stand transfer with Supervision using LRAD or no AD - not met  3. Bed to chair transfer with Supervision using LRAD or no AD - not met  4. Gait  x 250 feet with Supervision using LRAD or no AD - not met  5. Stand for 3 minutes with Supervision to increase activity tolerance - not met   6. Lower extremity exercise program x15 reps per handout, with independence - not met        Outcome: Ongoing (interventions implemented as appropriate)  Goals reviewed and remain appropriate. Pt progressing towards goals.    Radha Nguyen, PT, DPT   2018  [phone]

## 2018-11-09 NOTE — ASSESSMENT & PLAN NOTE
Managed per primary team  Avoid hypoglycemia    Recent Labs   Lab 11/09/18  0558   ALT 13   AST 17   ALKPHOS 74   BILITOT 1.5*   PROT 4.1*   ALBUMIN 3.0*

## 2018-11-09 NOTE — PROGRESS NOTES
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 Post-op course c/b hypercapneic and hypoxic respiratory failure and was reintubated though quickly extubated now doing well. Liver bx (10/6) sig for Zone 3 hemorrhage and collapse, in addition to cholestasis. Transferred from ICU on POD #4. ID consulted to comment on positive diphtheroid culture from ascitic fluid (likely skin colonization) as well as ESBL Klebsiella pneumoniae in urine culture (10/4).  Pt asymptomatic and cell count from ascitic fluid unremarkable. Per ID recs, no need to treat diphtheroids or asymptomatic ESBL Kleb pneumo bacteriuria though pt has had 6 days of therapy which would cover these organisms. Mild peritransplant ascites- repeat US 10/6 with increased arterial resistive indices. Repeat US 10/9 w/ continued elevation in RIs and fluid collections. LFTs trending down nicely.      Acute gradual worsening of renal function s/p OLTx.Creatinine on arrival 0.8 and has been up trending. Nephrology consulted for post-op EVA. Cr cont to trend up but remains to have good UOP. Had stable response to lasiz diuresis.  BUN elevated requiring multiple rounds of dialysis (10/15, 10/17, 10/20, 10/31). No significant improvements in kidney function noted.   In addition, pt H/H cont to fluctuate requiring multiple blood transfusions (10/14, 10/16, 10/18, 10/19, 10/22, 10/27, 10/29).  Pt reported dark stools 10/15 but color has normalized. FOBT+. EGD 10/18 consistent with ulcerated mucosa in duodenum s/p  coagulation. Concern for H. Pylori. Started Amoxicillin/levofloxacin (10/19, complete 11/2)). Remains on Protonix 40 mg BID. Biopsy negative for infection and CMV. Will cont close monitoring H/H, transfuse as needed with goal Hb > 7.0.  Of note, Urine cx + Klebsiella pneumoniae (10/13). Received 3 day course of ciprofloxacin, dc'd 10/19.   On 10/19 pm pt c/o crushing chest pain. Troponin 0.094, likely 2/2 ischemic stress. EKG NSR.      11/5/18 - no acute events overnight. Pt reports feeling well this am. One unit of PRBCs given 11/3 with good response. Cr level noted with only minimal increases. Lasix placed on hold per nephrology recs. Will plan to hydrate today w/ albumin. EGD 11/5 unimpressive for overt bleed. Pt remains on protonix 40 BID. Denies melena. Cont to hold heparin. WBC cont to decrease. Immunosuppression managed with hydrocortisone 50 (cont to hold Cellcept, bactrim, valcyte). Prograf now restarted and transitioned IV steroids to PO prednisone on 11/3. CMV PCR noted with 129. Cont to monitor.     Interval History: NAEON. NAD. Cr stable. Lasix started today    Review of patient's allergies indicates:  No Known Allergies  Current Facility-Administered Medications   Medication Frequency    aspirin chewable tablet 81 mg Daily    atovaquone suspension 1,500 mg Daily    bisacodyl EC tablet 10 mg Daily PRN    bisacodyl suppository 10 mg Daily PRN    calcium carbonate 200 mg calcium (500 mg) chewable tablet 500 mg TID PRN    dextrose 50% injection 12.5 g PRN    dextrose 50% injection 25 g PRN    docusate sodium capsule 100 mg Daily    entecavir tablet 0.5 mg Q72H    fluconazole tablet 200 mg Daily    furosemide tablet 40 mg BID    glucagon (human recombinant) injection 1 mg PRN    glucose chewable tablet 16 g PRN    glucose chewable tablet 24 g PRN    heparin (porcine) injection 5,000 Units Q8H    insulin aspart U-100 pen 0-5 Units QID (AC + HS) PRN    insulin aspart U-100 pen 6-8 Units TIDWM     magnesium oxide tablet 800 mg BID    midodrine tablet 15 mg TID    mirtazapine tablet 7.5 mg QHS    mycophenolate capsule 500 mg BID    ondansetron disintegrating tablet 8 mg Q8H PRN    ondansetron injection 4 mg Q8H PRN    oxyCODONE immediate release tablet 5 mg Q4H PRN    oxyCODONE immediate release tablet Tab 10 mg Q4H PRN    pantoprazole EC tablet 40 mg BID    polyethylene glycol packet 17 g Daily PRN    polyethylene glycol packet 17 g Daily    [START ON 11/10/2018] predniSONE tablet 15 mg Daily    Followed by    [START ON 11/17/2018] predniSONE tablet 10 mg Daily    Followed by    [START ON 11/24/2018] predniSONE tablet 5 mg Daily    Followed by    [START ON 12/1/2018] predniSONE tablet 2.5 mg Daily    promethazine (PHENERGAN) 6.25 mg in dextrose 5 % 50 mL IVPB Q6H PRN    sodium chloride 0.9% flush 3 mL PRN    tacrolimus capsule 1 mg BID    ursodiol capsule 300 mg BID       Objective:     Vital Signs (Most Recent):  Temp: 97.5 °F (36.4 °C) (11/09/18 0800)  Pulse: 109 (11/09/18 0800)  Resp: (!) 21 (11/09/18 0800)  BP: 107/66 (11/09/18 0800)  SpO2: 96 % (11/09/18 0800)  O2 Device (Oxygen Therapy): room air (11/08/18 2150) Vital Signs (24h Range):  Temp:  [97.5 °F (36.4 °C)-97.6 °F (36.4 °C)] 97.5 °F (36.4 °C)  Pulse:  [] 109  Resp:  [10-21] 21  SpO2:  [95 %-100 %] 96 %  BP: ()/() 107/66     Weight: 68.3 kg (150 lb 9.2 oz) (11/09/18 0500)  Body mass index is 26.67 kg/m².  Body surface area is 1.74 meters squared.    I/O last 3 completed shifts:  In: 1590 [P.O.:1590]  Out: 1025 [Urine:1025]    Physical Exam   Constitutional: She is oriented to person, place, and time. No distress.   HENT:   Head: Normocephalic and atraumatic.   Cardiovascular: Normal rate and regular rhythm.   Pulmonary/Chest: Effort normal. No respiratory distress. She has decreased breath sounds.   Abdominal: Soft. She exhibits distension.   Musculoskeletal: She exhibits edema. She exhibits no tenderness.    Neurological: She is alert and oriented to person, place, and time.   Skin: Skin is warm and dry. She is not diaphoretic.   Psychiatric: She has a normal mood and affect. Her behavior is normal.       Significant Labs:  CBC:   Recent Labs   Lab 11/09/18  0558   WBC 3.47*   RBC 2.78*   HGB 8.4*   HCT 25.6*   PLT 75*   MCV 92   MCH 30.2   MCHC 32.8     CMP:   Recent Labs   Lab 11/09/18  0558   *   CALCIUM 8.4*   ALBUMIN 3.0*   PROT 4.1*   *   K 4.2   CO2 22*      BUN 92*   CREATININE 2.4*   ALKPHOS 74   ALT 13   AST 17   BILITOT 1.5*     Coagulation:   Recent Labs   Lab 11/09/18  0558   INR 1.1     LFTs:   Recent Labs   Lab 11/09/18  0558   ALT 13   AST 17   ALKPHOS 74   BILITOT 1.5*   PROT 4.1*   ALBUMIN 3.0*     All labs within the past 24 hours have been reviewed.     Significant Imaging:  reviewed    Assessment/Plan:      Post LT - Acute renal failure    11/8/18: RECS: LASIXS 40 BID     Post-LT EVA is multifactorial in origin and has been related to the severity of liver disease, toxicity of immunosuppressive therapy and hepatic ischaemia reperfusion injury might be a driving force in the aetiology of post-LT EVA.      Plan: 11/09/18  -No indication for CRRT today   -Patient azotemia likely secondary to her H/H dropping from GI bleed  - 9L off from paracentesis but still very edematous in LE BL  - stable azotemia  No uremia  - we continue recommend lasix 40 BID                    Thank you for your consult.     Charles Benitez MD  Nephrology  [facility]    ATTENDING PHYSICIAN ATTESTATION  I have personally interviewed and examined the patient. I thoroughly reviewed the demographic, clinical, laboratorial and imaging information available in medical records. I agree with the assessment and recommendations provided by the subspecialty resident. Dr. Benitez was under my supervision.

## 2018-11-09 NOTE — PROGRESS NOTES
"[facility]  Endocrinology  Progress Note    Admit Date: 10/1/2018     Reason for Consult: Management of type 2 DM, Hyperglycemia      Surgical Procedure and Date: Liver transplant 10/5/18, Ex lap 10/6/18     Diabetes diagnosis year:      Home Diabetes Medications:  Lantus 18 units HS and novolog 17 units with meals plus correction scale (150-200 +1)        Lab Results   Component Value Date     HGBA1C 6.8 (H) 2018        How often checking glucose at home? 3-4   BG readings on regimen: 120-150.  Post prandial readings as high as upper 200s  Hypoglycemia on the regimen? yes, none recently   Missed doses on regimen?  No     Diabetes Complications include:     Diabetic peripheral neuropathy      Complicating diabetes co morbidities:   CIRRHOSIS        HPI:  Patient is a 68 y.o. female with a diagnosis of ESLD, listed for liver transplant with meld 23 who underwent live transplant on 10/5/18.  Back to OR on 10/6/18 for ex lap.  Admitted 10/1/18 for hyponatremia s/p paracentesis.  Personal has known diagnosis of diabetes, endocrine consulted for diabetes management.    Post-operative course complicated by ESBL Klebsiella pneumoniae in urine (in contact isolation), peritransplant ascites, worsening renal function (required temporary dialysis), anemia (multiple blood transfusions), and possible GI bleed.     Interval HPI:   Overnight events: remains in TSU. BG at goal overnight. Hypoglycemia yesterday before dinner. Paracentesis yesterday afternoon. Discharge planning for next week.   Eatin%  Nausea: No  Hypoglycemia and intervention: Yes 64/58/64 improved to 83; ate dinner  Fever: No  TPN and/or TF: No    /65   Pulse 89   Temp 97.6 °F (36.4 °C) (Oral)   Resp 13   Ht 5' 3" (1.6 m)   Wt 67.4 kg (148 lb 9.4 oz)   LMP  (LMP Unknown)   SpO2 100%   Breastfeeding? No   BMI 26.32 kg/m²      Labs Reviewed and Include    Recent Labs   Lab 18  0600   GLU 84   CALCIUM 8.2* "   ALBUMIN 2.7*   PROT 4.0*   *   K 4.1   CO2 24   CL 99   BUN 81*   CREATININE 2.4*   ALKPHOS 85   ALT 18   AST 20   BILITOT 1.6*     Lab Results   Component Value Date    WBC 3.15 (L) 11/08/2018    HGB 8.4 (L) 11/08/2018    HCT 26.2 (L) 11/08/2018    MCV 91 11/08/2018    PLT 82 (L) 11/08/2018     No results for input(s): TSH, FREET4 in the last 168 hours.  Lab Results   Component Value Date    HGBA1C 6.0 (H) 10/04/2018       Nutritional status:   Body mass index is 26.32 kg/m².  Lab Results   Component Value Date    ALBUMIN 2.7 (L) 11/08/2018    ALBUMIN 3.0 (L) 11/07/2018    ALBUMIN 3.0 (L) 11/06/2018     Lab Results   Component Value Date    PREALBUMIN 15 (L) 11/03/2018    PREALBUMIN 12 (L) 09/17/2018       Estimated Creatinine Clearance: 20.4 mL/min (A) (based on SCr of 2.4 mg/dL (H)).    Accu-Checks  Recent Labs     11/06/18  2043 11/07/18  0834 11/07/18  1225 11/07/18  1800 11/07/18  1800 11/07/18  1821 11/07/18  1841 11/07/18  1934 11/07/18  2031 11/08/18  0821   POCTGLUCOSE 242* 99 121* 58* 64* 54* 83 138* 143* 96       Current Medications and/or Treatments Impacting Glycemic Control  Immunotherapy:    Immunosuppressants         Stop Route Frequency     mycophenolate capsule 500 mg      -- Oral 2 times daily     tacrolimus capsule 1 mg      -- Oral 2 times daily        Steroids:   Hormones (From admission, onward)    Start     Stop Route Frequency Ordered    12/01/18 0900  predniSONE tablet 2.5 mg      12/08 0859 Oral Daily 11/05/18 1004    11/24/18 0900  predniSONE tablet 5 mg      12/01 0859 Oral Daily 11/05/18 1004    11/17/18 0900  predniSONE tablet 10 mg      11/24 0859 Oral Daily 11/05/18 1004    11/10/18 0900  predniSONE tablet 15 mg      11/17 0859 Oral Daily 11/05/18 1004    11/06/18 0900  predniSONE tablet 20 mg      11/10 0859 Oral Daily 11/05/18 1004    10/17/18 0945  predniSONE tablet 15 mg      10/24 0859 Oral Daily 10/17/18 0943        Pressors:    Autonomic Drugs (From admission, onward)     Start     Stop Route Frequency Ordered    10/10/18 1500  midodrine tablet 15 mg      -- Oral 3 times daily 10/10/18 1122    10/06/18 1617  rocuronium (ZEMURON) 10 mg/mL injection     Comments:  Created by cabinet override    10/07 0429   10/06/18 1617        Hyperglycemia/Diabetes Medications:   Antihyperglycemics (From admission, onward)    Start     Stop Route Frequency Ordered    11/08/18 0715  insulin aspart U-100 pen 6-8 Units      -- SubQ 3 times daily with meals 11/07/18 1829    10/14/18 0925  insulin aspart U-100 pen 0-5 Units      -- SubQ Before meals & nightly PRN 10/14/18 0825    10/07/18 1200  insulin regular (Humulin R) 100 Units in sodium chloride 0.9% 100 mL infusion      10/13 2100 IV Continuous 10/07/18 1055          ASSESSMENT and PLAN    * Liver transplanted    Managed per primary team  Avoid hypoglycemia    Recent Labs   Lab 11/08/18  0600   ALT 18   AST 20   ALKPHOS 85   BILITOT 1.6*   PROT 4.0*   ALBUMIN 2.7*            Type 2 diabetes mellitus with diabetic polyneuropathy, with long-term current use of insulin    BG goal 140-180.     Decrease Novolog to 6-8 units with meals; appetite remains variable each day depending on how she is feeling  Low dose correction scale given kidney function.  BG monitoring AC/HS    Discharge planning:  TBD     EVA (acute kidney injury)    Avoid insulin stacking and hypoglycemia.  Lab Results   Component Value Date    CREATININE 2.4 (H) 11/08/2018        Prophylactic immunotherapy    May increase insulin resistance.        Severe malnutrition    Oral intake encouraged, may affect BG readings         Aviva Caldera NP  Endocrinology  [facility]

## 2018-11-09 NOTE — PROGRESS NOTES
Met with patient and her  to complete discharge teaching.  Dunia interpreted this session.  Bobbi, caregiver, did not come as scheduled for this session. Reviewed the prevention section of My New Journey: Living Smart After My Liver Transplant. Allowed time for questions and answers.  Asked patient and  as well as Bobbi to complete the review questions in the book.

## 2018-11-09 NOTE — ASSESSMENT & PLAN NOTE
BG goal 140-180.     Increase Novolog to 8-10 units with meals; appetite remains variable each day depending on how she is feeling  Low dose correction scale given kidney function.  BG monitoring AC/HS    Start self/caregiver administration of insulin to review for home.     Discharge planning:  [place]

## 2018-11-09 NOTE — ASSESSMENT & PLAN NOTE
Avoid insulin stacking and hypoglycemia.  Lab Results   Component Value Date    CREATININE 2.4 (H) 11/08/2018

## 2018-11-09 NOTE — PT/OT/SLP PROGRESS
"Physical Therapy Treatment    Patient Name:  Scarlett Reddy   MRN:  [id number]    Recommendations:     Discharge Recommendations:  home health PT, home health OT   Discharge Equipment Recommendations: walker, rolling   Barriers to discharge: Decreased caregiver support at current functional level    Assessment:     Scarlett eRddy is a 68 y.o. female admitted with a medical diagnosis of Liver transplanted.  She presents with the following impairments/functional limitations:  weakness, gait instability, impaired endurance, impaired balance, edema, impaired functional mobilty, impaired self care skills, pain, impaired cardiopulmonary response to activity. Continues to require increased assist to complete transfer from sit>stand. Ambulating in hallway with RW and CGA, but with noted difficulty advancing LE and increased reliance on RW for support. Pt also continues to demo impaired endurance and generalized weakness, taking multiple standing rest breaks throughout gait. Pt would continue to benefit from skilled acute PT in order to address current deficits and progress functional mobility.     Rehab Prognosis:  good; patient would benefit from acute skilled PT services to address these deficits and reach maximum level of function.      Recent Surgery: Procedure(s) (LRB):  EGD (ESOPHAGOGASTRODUODENOSCOPY) (N/A) 4 Days Post-Op    Plan:     During this hospitalization, patient to be seen 4 x/week to address the above listed problems via gait training, therapeutic activities, therapeutic exercises, neuromuscular re-education  · Plan of Care Expires:  12/02/18   Plan of Care Reviewed with: patient    Subjective     Communicated with RN prior to session.  Patient found UIC upon PT entry to room, agreeable to treatment.      Chief Complaint: pain at incision site   Patient comments/goals: "Thank you very much Radha." Pt stating in English.   Pain/Comfort:  · Pain Rating 1: (reported pain at incision site but did not " rate)  · Pain Addressed 1: Reposition, Distraction    Patients cultural, spiritual, Buddhist conflicts given the current situation: none noted     Objective:     Patient found with: telemetry, pulse ox (continuous)     General Precautions: Standard, fall, contact   Orthopedic Precautions:N/A   Braces: N/A     Functional Mobility:  · Transfers:     · Sit to Stand:  moderate assistance with rolling walker  · Gait: ~80 ft. with RW and CGA  ? Multiple, intermittent standing rest breaks throughout gait  ? Decreased giuseppe, decreased step length, impaired weight-shifting ability, decreased toe-floor clearance   ? Noted difficulty advancing LE and increased reliance on RW to assist with off-loading LE; pt indicating shoulder fatigue/pain towards the end of gait  ? Noted fatigue following gait      AM-PAC 6 CLICK MOBILITY  Turning over in bed (including adjusting bedclothes, sheets and blankets)?: 3  Sitting down on and standing up from a chair with arms (e.g., wheelchair, bedside commode, etc.): 2  Moving from lying on back to sitting on the side of the bed?: 2  Moving to and from a bed to a chair (including a wheelchair)?: 3  Need to walk in hospital room?: 3  Climbing 3-5 steps with a railing?: 2  Basic Mobility Total Score: 15       Patient left up in chair with all lines intact, call button in reach and pt's  present..    GOALS:   Multidisciplinary Problems     Physical Therapy Goals        Problem: Physical Therapy Goal    Goal Priority Disciplines Outcome Goal Variances Interventions   Physical Therapy Goal     PT, PT/OT Ongoing (interventions implemented as appropriate)     Description:  Goals to be met by: 2018    Patient will increase functional independence with mobility by performin. Supine to sit with Contact Guard Assistance - not met  2. Sit to stand transfer with Supervision using LRAD or no AD - not met  3. Bed to chair transfer with Supervision using LRAD or no AD - not met  4. Gait   x 250 feet with Supervision using LRAD or no AD - not met  5. Stand for 3 minutes with Supervision to increase activity tolerance - not met   6. Lower extremity exercise program x15 reps per handout, with independence - not met                          Time Tracking:     PT Received On: 11/09/18  PT Start Time: 1447     PT Stop Time: 1503  PT Total Time (min): 16 min     Billable Minutes: Gait Training 16    Treatment Type: Treatment  PT/PTA: PT     PTA Visit Number: 0     Radha Nguyen PT, DPT   11/9/2018  [phone]

## 2018-11-09 NOTE — PROGRESS NOTES
[facility]  Liver Transplant  Progress Note    Patient Name: Scarlett Reddy  MRN: [id number]  Admission Date: 10/1/2018  Hospital Length of Stay: 38 days  Code Status: Full Code  Primary Care Provider: Primary Doctor No  Post-Operative Day: 35    ORGAN:   LIVER  Disease Etiology: Cirrhosis: Type B and D  Donor Type:    - Brain Death  CDC High Risk:   No  Donor CMV Status:   Donor CMV Status: Positive  Donor HBcAB:   Negative  Donor HCV Status:   Negative  Donor HBV SETH: Negative  Donor HCV SETH: Negative  Whole or Partial: Whole Liver  Biliary Anastomosis: End to End  Arterial Anatomy: Replaced Left Hepatic and Right Hepatic  Subjective:     History of Present Illness:  Ms. Reddy is a 67 y/o female with PMH of ESLD secondary Hep B and D.  Listed for liver transplant with MELD 23.  Paracentesis 10/1 with 5L removed, no fluid sent for analysis.  Morning labs significant for hyponatremia, Na 119.  Pt admitted to LTS for sodium monitoring.      Hospital Course: 67 y/o F h/o HBV/delta cirrhosis s/p DBDLT 10/5/18 (CMV D+/R+, steroid induction, MMF/tacro/pred maintenance) with take back on 10/6 2/2 hyperbilirubinema and bilious VALORIE drainage, no leak identified. Post-op course c/b hypercapneic and hypoxic respiratory failure and was reintubated though quickly extubated now doing well. Liver bx (10/6) sig for Zone 3 hemorrhage and collapse, in addition to cholestasis. Transferred from ICU on POD #4. ID consulted to comment on positive diphtheroid culture from ascitic fluid (likely skin colonization) as well as ESBL Klebsiella pneumoniae in urine culture (10/4).  Pt asymptomatic and cell count from ascitic fluid unremarkable. Per ID recs, no need to treat diphtheroids or asymptomatic ESBL Kleb pneumo bacteriuria though pt has had 6 days of therapy which would cover these organisms. Mild peritransplant ascites- repeat US 10/6 with increased arterial resistive indices. Repeat US 10/9 w/ continued  elevation in RIs and fluid collections. LFTs trending down nicely.     Acute gradual worsening of renal function s/p OLTx.Creatinine on arrival 0.8 and has been up trending. Nephrology consulted for post-op EVA. Cr cont to trend up but remains to have good UOP. Had stable response to lasiz diuresis.  BUN elevated requiring multiple rounds of dialysis (10/15, 10/17, 10/20, 10/31). No significant improvements in kidney function noted.   In addition, pt H/H cont to fluctuate requiring multiple blood transfusions (10/14, 10/16, 10/18, 10/19, 10/22, 10/27, 10/29).  Pt reported dark stools 10/15 but color has normalized. FOBT+. EGD 10/18 consistent with ulcerated mucosa in duodenum s/p coagulation. Concern for H. Pylori. Started Amoxicillin/levofloxacin (10/19, complete 11/2)). Remains on Protonix 40 mg BID. Biopsy negative for infection and CMV. Will cont close monitoring H/H, transfuse as needed with goal Hb > 7.0.  Of note, Urine cx + Klebsiella pneumoniae (10/13). Received 3 day course of ciprofloxacin, dc'd 10/19.   On 10/19 pm pt c/o crushing chest pain. Troponin 0.094, likely 2/2 ischemic stress. EKG NSR.   Interval history: No acute events overnight. Paracentesis 11/7 with 8.9 L removed. Wbc 39, segs 16%. Gram stain no organisms seen + no WBC. Pt feels better since having fluid removed but she continues to have abdominal distention & LE edema. Cr stable art 2.4. BUN increased from 92 from 81. No RRT today.  Liver US 11/6 showed no arterial flow. Repeat Liver US 11/8 showed arterial flow with increased RIs. Wbc decreased but stable. Valcyte remains on hold. Cellcept restarted at 500 mg bid on 11/6. CMV  on 11/1. CMV PCR pending, 11/8. Monitor.     Scheduled Meds:   [START ON 11/6/2018] atovaquone  1,500 mg Oral Daily    docusate sodium  100 mg Oral Daily    entecavir  0.5 mg Oral Q72H    fluconazole  200 mg Oral Daily    insulin aspart U-100  11-13 Units Subcutaneous TIDWM    magnesium oxide  800 mg  Oral BID    midodrine  15 mg Oral TID    mirtazapine  7.5 mg Oral QHS    pantoprazole  40 mg Intravenous BID    polyethylene glycol  17 g Oral Daily    [START ON 11/6/2018] predniSONE  20 mg Oral Daily    Followed by    [START ON 11/10/2018] predniSONE  15 mg Oral Daily    Followed by    [START ON 11/17/2018] predniSONE  10 mg Oral Daily    Followed by    [START ON 11/24/2018] predniSONE  5 mg Oral Daily    Followed by    [START ON 12/1/2018] predniSONE  2.5 mg Oral Daily    tacrolimus  1 mg Oral BID    ursodiol  300 mg Oral BID     Continuous Infusions:  PRN Meds:bisacodyl, bisacodyl, calcium carbonate, dextrose 50%, dextrose 50%, diphenhydrAMINE, fentaNYL, glucagon (human recombinant), glucose, glucose, HYDROmorphone, insulin aspart U-100, meperidine, ondansetron, ondansetron, ondansetron, oxyCODONE, oxyCODONE, polyethylene glycol, promethazine (PHENERGAN) IVPB, sodium chloride 0.9%    Review of Systems   Constitutional: Positive for activity change, appetite change (improving) and fatigue. Negative for chills and fever.   HENT: Negative for congestion and facial swelling.    Eyes: Negative for pain, discharge and visual disturbance.   Respiratory: Negative for cough, chest tightness, shortness of breath and wheezing.    Cardiovascular: Positive for leg swelling. Negative for chest pain and palpitations.   Gastrointestinal: Positive for abdominal distention and abdominal pain. Negative for blood in stool, constipation, diarrhea, nausea and vomiting.   Endocrine: Negative.    Genitourinary: Negative for decreased urine volume, difficulty urinating and dysuria.   Musculoskeletal: Negative for arthralgias and back pain.   Skin: Positive for wound.   Allergic/Immunologic: Positive for immunocompromised state.   Neurological: Positive for weakness. Negative for tremors and headaches.   Psychiatric/Behavioral: Negative for confusion and sleep disturbance.     Objective:     Vital Signs (Most Recent):  Temp:  97.3 °F (36.3 °C) (11/05/18 1312)  Pulse: 74 (11/05/18 1312)  Resp: 16 (11/05/18 1312)  BP: 122/73 (11/05/18 1312)  SpO2: 97 % (11/05/18 1312) Vital Signs (24h Range):  Temp:  [96.1 °F (35.6 °C)-97.9 °F (36.6 °C)] 97.3 °F (36.3 °C)  Pulse:  [65-89] 74  Resp:  [8-21] 16  SpO2:  [97 %-100 %] 97 %  BP: (104-140)/(54-87) 122/73     Weight: 73.1 kg (161 lb 2.5 oz)  Body mass index is 28.55 kg/m².    Intake/Output - Last 3 Shifts       11/03 0700 - 11/04 0659 11/04 0700 - 11/05 0659 11/05 0700 - 11/06 0659    P.O. 960 720 120    I.V. (mL/kg)   100 (1.4)    Blood  487.9     Total Intake(mL/kg) 960 (13) 1207.9 (16.5) 220 (3)    Urine (mL/kg/hr) 600 (0.3) 980 (0.6) 100 (0.2)    Emesis/NG output   0    Stool 0 0 0    Total Output 600 980 100    Net +360 +227.9 +120           Urine Occurrence 2 x  1 x    Stool Occurrence 1 x 1 x 1 x    Emesis Occurrence   1 x          Physical Exam   Constitutional: She is oriented to person, place, and time. She appears well-developed. No distress.   Temporal and distal extremity muscle wasting   HENT:   Head: Normocephalic and atraumatic.   Eyes: EOM are normal. Scleral icterus is present.   Neck: Normal range of motion.   Cardiovascular: Normal rate and regular rhythm.   No murmur heard.  Pulmonary/Chest: Effort normal and breath sounds normal. No respiratory distress. She has no wheezes.   Abdominal: Soft. Bowel sounds are normal. She exhibits distension. There is tenderness. There is no guarding.   Chevron incision w/ steri strips, minimal serous leakage from R side  RLQ VALORIE drain sites c/d/i with sutures   Musculoskeletal: Normal range of motion. She exhibits edema (3+ BLE).   Neurological: She is alert and oriented to person, place, and time.   Skin: Skin is warm and dry. She is not diaphoretic.   Nursing note and vitals reviewed.      Laboratory:  Immunosuppressants         Stop Route Frequency     tacrolimus capsule 1 mg      -- Oral 2 times daily        CBC:   Recent Labs   Lab  11/05/18  0530   WBC 2.50*   RBC 2.86*   HGB 8.4*   HCT 26.2*   PLT 90*   MCV 92   MCH 29.4   MCHC 32.1     CMP:   Recent Labs   Lab 11/05/18  0530   *   CALCIUM 8.8   ALBUMIN 3.7   PROT 5.2*   *   K 3.7   CO2 22*   CL 98   BUN 80*   CREATININE 2.1*   ALKPHOS 91   ALT 12   AST 14   BILITOT 1.8*     Labs within the past 24 hours have been reviewed.    Diagnostic Results:  I have personally reviewed all pertinent imaging studies.    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   - POD 1 US: increased arterial resistive indices, suggestive of edema vs acute rejection vs congestion.  - Repeat US 10/9 with continued elevation of RIs.  - LFTs stable.  - T bili trending down.  - Repeat liver US (10/11): elevated RIs.  - Liver US 11/6 to assess for ascites. No arterial flow within the liver allograft concerning for interval thrombosis/occlusion of the arterial system. Severe ascites seen.  - Liver US 11/8: arterial flow seen w/ elevated RIs  - LFT remain stable. No CTA at this time due to EVA and normal liver function.        Thrombocytopenia, unspecified    - Platelets 75 11/9  - Monitor.       Alteration in skin integrity    Monitor.       Leukopenia    - Stopped Cellcept, Bactrim, and Valcyte 10/31--> will resume once WBC improves and stable.   - Started Atovaquone 11/6.   - CMV PCR noted to be positive at 129. Will hold off on treatment with valcyte at this time. Repeat CMV PCR pending, 11/8.          Azotemia    - CRRT 10/31 for uremic toxin clearance.           UTI (urinary tract infection)    - Urine Cx 10/15 w/ Klebsiella pneumonia.  - Started Ciprofloxacin (10/17-10/19).   - Started on amoxicillin/levo for H. Pylori (completed abx 11/2).  - Pt asymptomatic at this time.       Acute blood loss anemia    - H&H cont to fluctuate.   -  FOBT +.  - EGD 10/18 - showed diffuse bleeding with ulcerated duodenal mucosa.  - EGD 11/5 - improvement seen, no active bleeding.  - 1 unit PRBCs transfused 10/27 and 11/3.   - H&H with good response.   - Resume heparin sq injections 11/6.   - Continue to monitor.            Post LT - Acute renal failure    - Creatinine on arrival 0.8 and trended up.  - Nephrology consulted.  - Renal US (10/11): no hydronephrosis.  - Echo 10/12: diastolic dysfunction, likely 2/2 to ARF.  - CRRT 10/14; SLED 10/17, 10/20. --> no improvements noted in kidney fxn.  - 24 hr urine creatine collection: CrCl of 7.   - CRRT 10/31 with 1L removed.   - Lasix 40 BID (restarted 11/1).  - Lasix on hold as of 11/3 per nephrology recs.  - Lasix restarted at 40 mg BID on 11/9 per nephrology recs     Hypervolemia associated with renal insufficiency    - CRRT 10/14, 10/17, 10/20, 10/31.  - CXR 10/30: abundant fluid in b/l pleural space.  - Restarted Lasix 40 BID x 2 doses (11/2).  - Lasix held and then restarted 11/9 at 40 mg BID per nephrology recs.         EVA (acute kidney injury)    - Cr stable at 2.4. BUN increased from 81 to 92.  - Nephrology involved. Appreciate recs.  - Monitor.     Metabolic acidosis    - CRRT 10/14.  - SLED 10/17, 10/20, 10/31.         At risk for opportunistic infections    - Valcyte for CMV prophylaxis--> holding (10/31) for leukopenia. Pt will need weekly CMV PCR while discontinued. Next due, 11/15.  - Bactrim for PCP prophylaxis (MWF).--> holding (10/31) for leukopenia.   - Start Atovaquone 11/6.  - Fluconazole for fungal prophylaxis.         Long-term use of immunosuppressant medication    -  Prograf: stopped 10/29. Resumed 11/2.  --> Will monitor for signs of toxic side effects, check daily troughs, and change meds accordingly.  - Prednisone: stopped 10/29. Started on Hydrocortisone. pred taper resumed.  - Cellcept: stopped 10/31 for leukopenia. Restarted Cellcept 500 mg bid, 11/6.         Prophylactic immunotherapy     - See long term use immunosuppresion.       Ascites    - paracentesis 10/1, 5L removed, no fluid sent for analysis.  - pt remains volume overloaded.   - Liver US 11/6 with severe ascites.   - Paracentesis 11/7 with 8.9 L removed. Wbc 39, segs 16%. Gram stain - no organisms seen and no WBC. Aerobic culture no growth. Anaerobic and     fungal cultures pending.        Weakness    - see physical deconditioning.       Physical deconditioning    - PT/OT consulted.  - Recommend home health PT and rolling walker at discharge (orders placed 11/6).       Hyponatremia    - Na 119 on admit.  - Na was improving post transplant w HD.  - Na stable at this time.              Hypotension    - continue Midodrine 15 mg TID. Monitor.       Anemia requiring transfusions    - Fluctuating H&H.    - Transfusions: 10/14; 10/16; 10/18; 10/19; 10/22; 10/27; 10/29, 10/31  - FOBT +  - LDH elevated, Hapto < 10.  - EGD 10/18: ulcerated duodenal mucosa.   - Monitoring w/ CBC q12hr to assess the need for transfusion.  - H&H stable. No transfusion needed at this time.   - Consulted GI about this ongoing issue and further need of another EGD.  - EGD 11/5 unremarkable.          Severe malnutrition    - dietary consulted.   - temporal and distal extremity muscle wasting and edema.  - encourage supplements and to increase nutritional intake.  - per nephrology, pt to be on low protein diet.   - Prealbumin reviewed.      Type 2 diabetes mellitus with diabetic polyneuropathy, with long-term current use of insulin    - continue home regimen.  - endocrine consulted.  - Pt off insulin drip at this time.  Apprec endo recs.        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  -Tx w/ Entecavir- dose changed to q72 hours given renal function.             VTE Risk Mitigation (From admission, onward)        Ordered     heparin (porcine) injection 5,000 Units  Every 8 hours      11/06/18 1021     IP VTE  HIGH RISK PATIENT  Once      11/05/18 1145     Place sequential compression device  Until discontinued      10/05/18 0709          The patients clinical status was discussed at multidisplinary rounds, involving transplant surgery, transplant medicine, pharmacy, nursing, nutrition, and social work    Discharge Planning:  No Patient Care Coordination Note on file.      Elizabeth Dean PA-C  Liver Transplant  [facility]

## 2018-11-10 LAB
ALBUMIN SERPL BCP-MCNC: 3.4 G/DL
ALP SERPL-CCNC: 116 U/L
ALT SERPL W/O P-5'-P-CCNC: 18 U/L
ANION GAP SERPL CALC-SCNC: 13 MMOL/L
AST SERPL-CCNC: 21 U/L
BACTERIA SPEC AEROBE CULT: NO GROWTH
BASOPHILS # BLD AUTO: 0.03 K/UL
BASOPHILS NFR BLD: 0.4 %
BILIRUB SERPL-MCNC: 1.7 MG/DL
BUN SERPL-MCNC: 97 MG/DL
CALCIUM SERPL-MCNC: 8.5 MG/DL
CHLORIDE SERPL-SCNC: 98 MMOL/L
CMV DNA SERPL NAA+PROBE-ACNC: 105 IU/ML
CO2 SERPL-SCNC: 21 MMOL/L
CREAT SERPL-MCNC: 2.6 MG/DL
DIFFERENTIAL METHOD: ABNORMAL
EOSINOPHIL # BLD AUTO: 0 K/UL
EOSINOPHIL NFR BLD: 0.1 %
ERYTHROCYTE [DISTWIDTH] IN BLOOD BY AUTOMATED COUNT: 17.5 %
EST. GFR  (AFRICAN AMERICAN): 20.9 ML/MIN/1.73 M^2
EST. GFR  (NON AFRICAN AMERICAN): 18.2 ML/MIN/1.73 M^2
GLUCOSE SERPL-MCNC: 209 MG/DL
HCT VFR BLD AUTO: 31.1 %
HGB BLD-MCNC: 10.2 G/DL
IMM GRANULOCYTES # BLD AUTO: 0.25 K/UL
IMM GRANULOCYTES NFR BLD AUTO: 3.5 %
INR PPP: 1
LYMPHOCYTES # BLD AUTO: 0.5 K/UL
LYMPHOCYTES NFR BLD: 6.5 %
MAGNESIUM SERPL-MCNC: 2.3 MG/DL
MCH RBC QN AUTO: 29.7 PG
MCHC RBC AUTO-ENTMCNC: 32.8 G/DL
MCV RBC AUTO: 91 FL
MONOCYTES # BLD AUTO: 0.8 K/UL
MONOCYTES NFR BLD: 11 %
NEUTROPHILS # BLD AUTO: 5.7 K/UL
NEUTROPHILS NFR BLD: 78.5 %
NRBC BLD-RTO: 0 /100 WBC
PHOSPHATE SERPL-MCNC: 3.5 MG/DL
PLATELET # BLD AUTO: 132 K/UL
PMV BLD AUTO: 9.9 FL
POCT GLUCOSE: 230 MG/DL (ref 70–110)
POCT GLUCOSE: 261 MG/DL (ref 70–110)
POCT GLUCOSE: 291 MG/DL (ref 70–110)
POCT GLUCOSE: 298 MG/DL (ref 70–110)
POTASSIUM SERPL-SCNC: 4.8 MMOL/L
PROT SERPL-MCNC: 4.9 G/DL
PROTHROMBIN TIME: 10.7 SEC
RBC # BLD AUTO: 3.43 M/UL
SODIUM SERPL-SCNC: 132 MMOL/L
TACROLIMUS BLD-MCNC: 9.8 NG/ML
WBC # BLD AUTO: 7.2 K/UL

## 2018-11-10 PROCEDURE — 25000003 PHARM REV CODE 250: Performed by: NURSE PRACTITIONER

## 2018-11-10 PROCEDURE — 25000003 PHARM REV CODE 250: Performed by: PHYSICIAN ASSISTANT

## 2018-11-10 PROCEDURE — 99900035 HC TECH TIME PER 15 MIN (STAT)

## 2018-11-10 PROCEDURE — 80053 COMPREHEN METABOLIC PANEL: CPT

## 2018-11-10 PROCEDURE — 84100 ASSAY OF PHOSPHORUS: CPT

## 2018-11-10 PROCEDURE — 20600001 HC STEP DOWN PRIVATE ROOM

## 2018-11-10 PROCEDURE — 25000003 PHARM REV CODE 250: Performed by: TRANSPLANT SURGERY

## 2018-11-10 PROCEDURE — 85025 COMPLETE CBC W/AUTO DIFF WBC: CPT

## 2018-11-10 PROCEDURE — 83735 ASSAY OF MAGNESIUM: CPT

## 2018-11-10 PROCEDURE — 63600175 PHARM REV CODE 636 W HCPCS: Performed by: NURSE PRACTITIONER

## 2018-11-10 PROCEDURE — 80197 ASSAY OF TACROLIMUS: CPT

## 2018-11-10 PROCEDURE — 85610 PROTHROMBIN TIME: CPT

## 2018-11-10 PROCEDURE — 94664 DEMO&/EVAL PT USE INHALER: CPT

## 2018-11-10 PROCEDURE — 94761 N-INVAS EAR/PLS OXIMETRY MLT: CPT

## 2018-11-10 PROCEDURE — 25000003 PHARM REV CODE 250: Performed by: STUDENT IN AN ORGANIZED HEALTH CARE EDUCATION/TRAINING PROGRAM

## 2018-11-10 PROCEDURE — 99232 SBSQ HOSP IP/OBS MODERATE 35: CPT | Mod: ,,, | Performed by: NURSE PRACTITIONER

## 2018-11-10 PROCEDURE — 99233 SBSQ HOSP IP/OBS HIGH 50: CPT | Mod: 24,,, | Performed by: PHYSICIAN ASSISTANT

## 2018-11-10 RX ORDER — FUROSEMIDE 10 MG/ML
40 INJECTION INTRAMUSCULAR; INTRAVENOUS 2 TIMES DAILY
Status: CANCELLED | OUTPATIENT
Start: 2018-11-10

## 2018-11-10 RX ORDER — FUROSEMIDE 40 MG/1
40 TABLET ORAL 2 TIMES DAILY
Status: DISCONTINUED | OUTPATIENT
Start: 2018-11-10 | End: 2018-11-12

## 2018-11-10 RX ADMIN — INSULIN ASPART 10 UNITS: 100 INJECTION, SOLUTION INTRAVENOUS; SUBCUTANEOUS at 05:11

## 2018-11-10 RX ADMIN — FLUCONAZOLE 200 MG: 200 TABLET ORAL at 08:11

## 2018-11-10 RX ADMIN — MYCOPHENOLATE MOFETIL 500 MG: 250 CAPSULE ORAL at 08:11

## 2018-11-10 RX ADMIN — MAGNESIUM OXIDE TAB 400 MG (241.3 MG ELEMENTAL MG) 800 MG: 400 (241.3 MG) TAB at 08:11

## 2018-11-10 RX ADMIN — MIDODRINE HYDROCHLORIDE 15 MG: 5 TABLET ORAL at 02:11

## 2018-11-10 RX ADMIN — HEPARIN SODIUM 5000 UNITS: 5000 INJECTION, SOLUTION INTRAVENOUS; SUBCUTANEOUS at 06:11

## 2018-11-10 RX ADMIN — MIRTAZAPINE 7.5 MG: 7.5 TABLET ORAL at 08:11

## 2018-11-10 RX ADMIN — INSULIN ASPART 8 UNITS: 100 INJECTION, SOLUTION INTRAVENOUS; SUBCUTANEOUS at 08:11

## 2018-11-10 RX ADMIN — PANTOPRAZOLE SODIUM 40 MG: 40 TABLET, DELAYED RELEASE ORAL at 08:11

## 2018-11-10 RX ADMIN — TACROLIMUS 1 MG: 1 CAPSULE ORAL at 05:11

## 2018-11-10 RX ADMIN — HEPARIN SODIUM 5000 UNITS: 5000 INJECTION, SOLUTION INTRAVENOUS; SUBCUTANEOUS at 09:11

## 2018-11-10 RX ADMIN — TACROLIMUS 1 MG: 1 CAPSULE ORAL at 08:11

## 2018-11-10 RX ADMIN — DOCUSATE SODIUM 100 MG: 100 CAPSULE, LIQUID FILLED ORAL at 08:11

## 2018-11-10 RX ADMIN — MIDODRINE HYDROCHLORIDE 15 MG: 5 TABLET ORAL at 08:11

## 2018-11-10 RX ADMIN — INSULIN ASPART 10 UNITS: 100 INJECTION, SOLUTION INTRAVENOUS; SUBCUTANEOUS at 12:11

## 2018-11-10 RX ADMIN — ASPIRIN 81 MG CHEWABLE TABLET 81 MG: 81 TABLET CHEWABLE at 08:11

## 2018-11-10 RX ADMIN — ATOVAQUONE 1500 MG: 750 SUSPENSION ORAL at 08:11

## 2018-11-10 RX ADMIN — FUROSEMIDE 40 MG: 40 TABLET ORAL at 05:11

## 2018-11-10 RX ADMIN — URSODIOL 300 MG: 300 CAPSULE ORAL at 08:11

## 2018-11-10 RX ADMIN — PREDNISONE 15 MG: 5 TABLET ORAL at 09:11

## 2018-11-10 RX ADMIN — OXYCODONE HYDROCHLORIDE 5 MG: 5 TABLET ORAL at 08:11

## 2018-11-10 RX ADMIN — HEPARIN SODIUM 5000 UNITS: 5000 INJECTION, SOLUTION INTRAVENOUS; SUBCUTANEOUS at 02:11

## 2018-11-10 RX ADMIN — INSULIN ASPART 1 UNITS: 100 INJECTION, SOLUTION INTRAVENOUS; SUBCUTANEOUS at 09:11

## 2018-11-10 NOTE — SUBJECTIVE & OBJECTIVE
Scheduled Meds:   aspirin  81 mg Oral Daily    atovaquone  1,500 mg Oral Daily    docusate sodium  100 mg Oral Daily    entecavir  0.5 mg Oral Q72H    fluconazole  200 mg Oral Daily    heparin (porcine)  5,000 Units Subcutaneous Q8H    insulin aspart U-100  8-10 Units Subcutaneous TIDWM    magnesium oxide  800 mg Oral BID    midodrine  15 mg Oral TID    mirtazapine  7.5 mg Oral QHS    mycophenolate  500 mg Oral BID    pantoprazole  40 mg Oral BID    polyethylene glycol  17 g Oral Daily    predniSONE  15 mg Oral Daily    Followed by    [START ON 11/17/2018] predniSONE  10 mg Oral Daily    Followed by    [START ON 11/24/2018] predniSONE  5 mg Oral Daily    Followed by    [START ON 12/1/2018] predniSONE  2.5 mg Oral Daily    tacrolimus  1 mg Oral BID    ursodiol  300 mg Oral BID     Continuous Infusions:  PRN Meds:bisacodyl, bisacodyl, calcium carbonate, dextrose 50%, dextrose 50%, glucagon (human recombinant), glucose, glucose, insulin aspart U-100, ondansetron, ondansetron, oxyCODONE, oxyCODONE, polyethylene glycol, promethazine (PHENERGAN) IVPB, sodium chloride 0.9%    Review of Systems   Constitutional: Positive for activity change, appetite change (improving) and fatigue. Negative for chills and fever.   HENT: Negative for congestion and facial swelling.    Eyes: Negative for pain, discharge and visual disturbance.   Respiratory: Negative for cough, chest tightness, shortness of breath and wheezing.    Cardiovascular: Positive for leg swelling. Negative for chest pain and palpitations.   Gastrointestinal: Positive for abdominal distention and abdominal pain. Negative for blood in stool, constipation, diarrhea, nausea and vomiting.   Endocrine: Negative.    Genitourinary: Negative for decreased urine volume, difficulty urinating and dysuria.   Musculoskeletal: Negative for arthralgias and back pain.   Skin: Positive for wound.   Allergic/Immunologic: Positive for immunocompromised state.    Neurological: Positive for weakness. Negative for tremors and headaches.   Psychiatric/Behavioral: Negative for confusion and sleep disturbance.     Objective:     Vital Signs (Most Recent):  Temp: 97.9 °F (36.6 °C) (11/09/18 1929)  Pulse: 79 (11/10/18 0415)  Resp: 14 (11/10/18 0415)  BP: 134/81 (11/10/18 0415)  SpO2: 100 % (11/10/18 0415) Vital Signs (24h Range):  Temp:  [97.4 °F (36.3 °C)-97.9 °F (36.6 °C)] 97.9 °F (36.6 °C)  Pulse:  [79-96] 79  Resp:  [14-20] 14  SpO2:  [98 %-100 %] 100 %  BP: (118-134)/(74-81) 134/81     Weight: 68.4 kg (150 lb 12.7 oz)  Body mass index is 26.71 kg/m².    Intake/Output - Last 3 Shifts       11/08 0700 - 11/09 0659 11/09 0700 - 11/10 0659 11/10 0700 - 11/11 0659    P.O. 1240 400     I.V. (mL/kg) 0 (0) 0 (0)     Other 0 0     Total Intake(mL/kg) 1240 (18.2) 400 (5.8)     Urine (mL/kg/hr) 900 (0.5) 750 (0.5)     Stool 0 0     Total Output 900 750     Net +340 -350            Urine Occurrence 0 x 0 x     Stool Occurrence 2 x 2 x           Physical Exam   Constitutional: She is oriented to person, place, and time. She appears well-developed. No distress.   Temporal and distal extremity muscle wasting   HENT:   Head: Normocephalic and atraumatic.   Eyes: EOM are normal. Scleral icterus is present.   Neck: Normal range of motion.   Cardiovascular: Normal rate and regular rhythm.   No murmur heard.  Pulmonary/Chest: Effort normal and breath sounds normal. No respiratory distress. She has no wheezes.   Abdominal: Soft. Bowel sounds are normal. She exhibits distension. There is tenderness. There is no guarding.   Chevron incision w/ steri strips, minimal serous leakage from R side  RLQ VALORIE drain sites c/d/i with sutures   Musculoskeletal: Normal range of motion. She exhibits edema (3+ BLE).   Neurological: She is alert and oriented to person, place, and time.   Skin: Skin is warm and dry. She is not diaphoretic.   Nursing note and vitals reviewed.      Laboratory:  Immunosuppressants          Stop Route Frequency     mycophenolate capsule 500 mg      -- Oral 2 times daily     tacrolimus capsule 1 mg      -- Oral 2 times daily        CBC:   Recent Labs   Lab 11/10/18  0600   WBC 7.20   RBC 3.43*   HGB 10.2*   HCT 31.1*   *   MCV 91   MCH 29.7   MCHC 32.8     CMP:   Recent Labs   Lab 11/10/18  0600   *   CALCIUM 8.5*   ALBUMIN 3.4*   PROT 4.9*   *   K 4.8   CO2 21*   CL 98   BUN 97*   CREATININE 2.6*   ALKPHOS 116   ALT 18   AST 21   BILITOT 1.7*     Labs within the past 24 hours have been reviewed.    Diagnostic Results:  I have personally reviewed all pertinent imaging studies.

## 2018-11-10 NOTE — ASSESSMENT & PLAN NOTE
- 11/10: Cr increased from 2.4 to 2.6. BUN increased from 92 to 97.  - Nephrology involved. Appreciate recs.  - Monitor.

## 2018-11-10 NOTE — PROGRESS NOTES
[facility]  Liver Transplant  Progress Note    Patient Name: Scarlett Reddy  MRN: [id number]  Admission Date: 10/1/2018  Hospital Length of Stay: 39 days  Code Status: Full Code  Primary Care Provider: Primary Doctor No  Post-Operative Day: 36    ORGAN:   LIVER  Disease Etiology: Cirrhosis: Type B and D  Donor Type:    - Brain Death  CDC High Risk:   No  Donor CMV Status:   Donor CMV Status: Positive  Donor HBcAB:   Negative  Donor HCV Status:   Negative  Donor HBV SETH: Negative  Donor HCV SETH: Negative  Whole or Partial: Whole Liver  Biliary Anastomosis: End to End  Arterial Anatomy: Replaced Left Hepatic and Right Hepatic  Subjective:     History of Present Illness:  Ms. Reddy is a 67 y/o female with PMH of ESLD secondary Hep B and D.  Listed for liver transplant with MELD 23.  Paracentesis 10/1 with 5L removed, no fluid sent for analysis.  Morning labs significant for hyponatremia, Na 119.  Pt admitted to LTS for sodium monitoring.        Hospital Course:  Hospital Course: 67 y/o F h/o HBV/delta cirrhosis s/p DBDLT 10/5/18 (CMV D+/R+, steroid induction, MMF/tacro/pred maintenance) with take back on 10/6 2/2 hyperbilirubinema and bilious VALORIE drainage, no leak identified. Post-op course c/b hypercapneic and hypoxic respiratory failure and was reintubated though quickly extubated now doing well. Liver bx (10/6) sig for Zone 3 hemorrhage and collapse, in addition to cholestasis. Transferred from ICU on POD #4. ID consulted to comment on positive diphtheroid culture from ascitic fluid (likely skin colonization) as well as ESBL Klebsiella pneumoniae in urine culture (10/4).  Pt asymptomatic and cell count from ascitic fluid unremarkable. Per ID recs, no need to treat diphtheroids or asymptomatic ESBL Kleb pneumo bacteriuria though pt has had 6 days of therapy which would cover these organisms. Mild peritransplant ascites- repeat US 10/6 with increased arterial resistive indices. Repeat US 10/9  w/ continued elevation in RIs and fluid collections. LFTs trending down nicely.     Acute gradual worsening of renal function s/p OLTx.Creatinine on arrival 0.8 and has been up trending. Nephrology consulted for post-op EVA. Cr cont to trend up but remains to have good UOP. Had stable response to lasiz diuresis.  BUN elevated requiring multiple rounds of dialysis (10/15, 10/17, 10/20, 10/31). No significant improvements in kidney function noted.   In addition, pt H/H cont to fluctuate requiring multiple blood transfusions (10/14, 10/16, 10/18, 10/19, 10/22, 10/27, 10/29).  Pt reported dark stools 10/15 but color has normalized. FOBT+. EGD 10/18 consistent with ulcerated mucosa in duodenum s/p coagulation. Concern for H. Pylori. Started Amoxicillin/levofloxacin (10/19, complete 11/2)). Remains on Protonix 40 mg BID. Biopsy negative for infection and CMV. Will cont close monitoring H/H, transfuse as needed with goal Hb > 7.0.  Of note, Urine cx + Klebsiella pneumoniae (10/13). Received 3 day course of ciprofloxacin, dc'd 10/19.   On 10/19 pm pt c/o crushing chest pain. Troponin 0.094, likely 2/2 ischemic stress. EKG NSR.     Interval history: No acute events overnight. Paracentesis 11/7 with 8.9 L removed. Wbc 39, segs 16%. Gram stain no organisms seen + no WBC. Pt feels better since having fluid removed but she continues to have abdominal distention & LE edema. Started 40 mg Lasix BID yesterday, per nephrology recs. However, renal fx not improving (Cr 2.6 from 2.4, BUN 97 from 91). No RRT today. Poor PO intake. Continue Lasix due to significant generalized edema, and cont to monitor. Liver US 11/6 showed no arterial flow. Repeat Liver US 11/8 showed arterial flow with increased RIs. Wbc decreased but stable. Valcyte remains on hold. Cellcept restarted at 500 mg bid on 11/6. CMV  on 11/1. CMV PCR pending, 11/8. Monitor.     Scheduled Meds:   aspirin  81 mg Oral Daily    atovaquone  1,500 mg Oral Daily     docusate sodium  100 mg Oral Daily    entecavir  0.5 mg Oral Q72H    fluconazole  200 mg Oral Daily    heparin (porcine)  5,000 Units Subcutaneous Q8H    insulin aspart U-100  8-10 Units Subcutaneous TIDWM    magnesium oxide  800 mg Oral BID    midodrine  15 mg Oral TID    mirtazapine  7.5 mg Oral QHS    mycophenolate  500 mg Oral BID    pantoprazole  40 mg Oral BID    polyethylene glycol  17 g Oral Daily    predniSONE  15 mg Oral Daily    Followed by    [START ON 11/17/2018] predniSONE  10 mg Oral Daily    Followed by    [START ON 11/24/2018] predniSONE  5 mg Oral Daily    Followed by    [START ON 12/1/2018] predniSONE  2.5 mg Oral Daily    tacrolimus  1 mg Oral BID    ursodiol  300 mg Oral BID     Continuous Infusions:  PRN Meds:bisacodyl, bisacodyl, calcium carbonate, dextrose 50%, dextrose 50%, glucagon (human recombinant), glucose, glucose, insulin aspart U-100, ondansetron, ondansetron, oxyCODONE, oxyCODONE, polyethylene glycol, promethazine (PHENERGAN) IVPB, sodium chloride 0.9%    Review of Systems   Constitutional: Positive for activity change, appetite change (improving) and fatigue. Negative for chills and fever.   HENT: Negative for congestion and facial swelling.    Eyes: Negative for pain, discharge and visual disturbance.   Respiratory: Negative for cough, chest tightness, shortness of breath and wheezing.    Cardiovascular: Positive for leg swelling. Negative for chest pain and palpitations.   Gastrointestinal: Positive for abdominal distention and abdominal pain. Negative for blood in stool, constipation, diarrhea, nausea and vomiting.   Endocrine: Negative.    Genitourinary: Negative for decreased urine volume, difficulty urinating and dysuria.   Musculoskeletal: Negative for arthralgias and back pain.   Skin: Positive for wound.   Allergic/Immunologic: Positive for immunocompromised state.   Neurological: Positive for weakness. Negative for tremors and headaches.    Psychiatric/Behavioral: Negative for confusion and sleep disturbance.     Objective:     Vital Signs (Most Recent):  Temp: 97.9 °F (36.6 °C) (11/09/18 1929)  Pulse: 79 (11/10/18 0415)  Resp: 14 (11/10/18 0415)  BP: 134/81 (11/10/18 0415)  SpO2: 100 % (11/10/18 0415) Vital Signs (24h Range):  Temp:  [97.4 °F (36.3 °C)-97.9 °F (36.6 °C)] 97.9 °F (36.6 °C)  Pulse:  [79-96] 79  Resp:  [14-20] 14  SpO2:  [98 %-100 %] 100 %  BP: (118-134)/(74-81) 134/81     Weight: 68.4 kg (150 lb 12.7 oz)  Body mass index is 26.71 kg/m².    Intake/Output - Last 3 Shifts       11/08 0700 - 11/09 0659 11/09 0700 - 11/10 0659 11/10 0700 - 11/11 0659    P.O. 1240 400     I.V. (mL/kg) 0 (0) 0 (0)     Other 0 0     Total Intake(mL/kg) 1240 (18.2) 400 (5.8)     Urine (mL/kg/hr) 900 (0.5) 750 (0.5)     Stool 0 0     Total Output 900 750     Net +340 -350            Urine Occurrence 0 x 0 x     Stool Occurrence 2 x 2 x           Physical Exam   Constitutional: She is oriented to person, place, and time. She appears well-developed. No distress.   Temporal and distal extremity muscle wasting   HENT:   Head: Normocephalic and atraumatic.   Eyes: EOM are normal. Scleral icterus is present.   Neck: Normal range of motion.   Cardiovascular: Normal rate and regular rhythm.   No murmur heard.  Pulmonary/Chest: Effort normal and breath sounds normal. No respiratory distress. She has no wheezes.   Abdominal: Soft. Bowel sounds are normal. She exhibits distension. There is tenderness. There is no guarding.   Chevron incision w/ steri strips, minimal serous leakage from R side  RLQ VALORIE drain sites c/d/i with sutures   Musculoskeletal: Normal range of motion. She exhibits edema (3+ BLE).   Neurological: She is alert and oriented to person, place, and time.   Skin: Skin is warm and dry. She is not diaphoretic.   Nursing note and vitals reviewed.      Laboratory:  Immunosuppressants         Stop Route Frequency     mycophenolate capsule 500 mg      -- Oral 2  times daily     tacrolimus capsule 1 mg      -- Oral 2 times daily        CBC:   Recent Labs   Lab 11/10/18  0600   WBC 7.20   RBC 3.43*   HGB 10.2*   HCT 31.1*   *   MCV 91   MCH 29.7   MCHC 32.8     CMP:   Recent Labs   Lab 11/10/18  0600   *   CALCIUM 8.5*   ALBUMIN 3.4*   PROT 4.9*   *   K 4.8   CO2 21*   CL 98   BUN 97*   CREATININE 2.6*   ALKPHOS 116   ALT 18   AST 21   BILITOT 1.7*     Labs within the past 24 hours have been reviewed.    Diagnostic Results:  I have personally reviewed all pertinent imaging studies.    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   - POD 1 US: increased arterial resistive indices, suggestive of edema vs acute rejection vs congestion.  - Repeat US 10/9 with continued elevation of RIs.  - LFTs stable.  - T bili trending down.  - Repeat liver US (10/11): elevated RIs.  - Liver US 11/6 to assess for ascites. No arterial flow within the liver allograft concerning for interval thrombosis/occlusion of the arterial system. Severe ascites seen.  - Liver US 11/8: arterial flow seen w/ elevated RIs  - LFT remain stable. No CTA at this time due to EVA and normal liver function.        Thrombocytopenia, unspecified    - Improving.  - Patelets 132 11/10  - Monitor.       Alteration in skin integrity    Monitor.       Leukopenia    - Stopped Cellcept, Bactrim, and Valcyte 10/31--> will resume once WBC improves and stable.   - Started Atovaquone 11/6.   - CMV PCR noted to be positive at 129. Will hold off on treatment with valcyte at this time. Repeat CMV PCR pending, 11/8.          Azotemia    - CRRT 10/31 for uremic toxin clearance.           UTI (urinary tract infection)    - Urine Cx 10/15 w/ Klebsiella pneumonia.  - Started Ciprofloxacin (10/17-10/19).   - Started on amoxicillin/levo for H. Pylori  (completed abx 11/2).  - Pt asymptomatic at this time.       Acute blood loss anemia    - H&H cont to fluctuate.   - FOBT +.  - EGD 10/18 - showed diffuse bleeding with ulcerated duodenal mucosa.  - EGD 11/5 - improvement seen, no active bleeding.  - 1 unit PRBCs transfused 10/27 and 11/3.   - H&H with good response.   - Resume heparin sq injections 11/6.   - Continue to monitor.            Post LT - Acute renal failure    - Creatinine on arrival 0.8 and trended up.  - Nephrology consulted.  - Renal US (10/11): no hydronephrosis.  - Echo 10/12: diastolic dysfunction, likely 2/2 to ARF.  - CRRT 10/14; SLED 10/17, 10/20. --> no improvements noted in kidney fxn.  - 24 hr urine creatine collection: CrCl of 7.   - CRRT 10/31 with 1L removed.   - Lasix 40 BID (restarted 11/1).  - Lasix on hold as of 11/3 per nephrology recs.  - Lasix restarted at 40 mg BID on 11/9 per nephrology recs, but renal fx worsening. Monitor.     Hypervolemia associated with renal insufficiency    - CRRT 10/14, 10/17, 10/20, 10/31.  - CXR 10/30: abundant fluid in b/l pleural space.  - Restarted Lasix 40 BID x 2 doses (11/2).  - Lasix held and then restarted 11/9 at 40 mg BID per nephrology recs. Monitor.           EVA (acute kidney injury)    - 11/10: Cr increased from 2.4 to 2.6. BUN increased from 92 to 97.  - Nephrology involved. Appreciate recs.  - Monitor.     Metabolic acidosis    - CRRT 10/14.  - SLED 10/17, 10/20, 10/31.         At risk for opportunistic infections    - Valcyte for CMV prophylaxis--> holding (10/31) for leukopenia. Pt will need weekly CMV PCR while discontinued. Next due, 11/15.  - Bactrim for PCP prophylaxis (MWF).--> holding (10/31) for leukopenia.   - Start Atovaquone 11/6.  - Fluconazole for fungal prophylaxis.         Long-term use of immunosuppressant medication    -  Prograf: stopped 10/29. Resumed 11/2.  --> Will monitor for signs of toxic side effects, check daily troughs, and change meds accordingly.  -  Prednisone: stopped 10/29. Started on Hydrocortisone. pred taper resumed.  - Cellcept: stopped 10/31 for leukopenia. Restarted Cellcept 500 mg bid, 11/6.         Prophylactic immunotherapy    - See long term use immunosuppresion.       Ascites    - paracentesis 10/1, 5L removed, no fluid sent for analysis.  - pt remains volume overloaded.   - Liver US 11/6 with severe ascites.   - Paracentesis 11/7 with 8.9 L removed. Wbc 39, segs 16%. Gram stain - no organisms seen and no WBC. Aerobic culture no growth. Anaerobic and fungal cultures pending.        Weakness    - see physical deconditioning.       Physical deconditioning    - PT/OT consulted.  - Recommend home health PT and rolling walker at discharge (orders placed 11/6).       Hyponatremia    - Na 119 on admit.  - Na was improving post transplant w HD.  - Na stable at this time.              Hypotension    - continue Midodrine 15 mg TID. Monitor.       Anemia requiring transfusions    - Fluctuating H&H.    - Transfusions: 10/14; 10/16; 10/18; 10/19; 10/22; 10/27; 10/29, 10/31  - FOBT +  - LDH elevated, Hapto < 10.  - EGD 10/18: ulcerated duodenal mucosa.   - Monitoring w/ CBC q12hr to assess the need for transfusion.  - H&H stable. No transfusion needed at this time.   - Consulted GI about this ongoing issue and further need of another EGD.  - EGD 11/5 unremarkable.          Severe malnutrition    - dietary consulted.   - temporal and distal extremity muscle wasting and edema.  - encourage supplements and to increase nutritional intake.  - per nephrology, pt to be on low protein diet.   - Prealbumin reviewed.      Type 2 diabetes mellitus with diabetic polyneuropathy, with long-term current use of insulin    - continue home regimen.  - endocrine consulted.  - Pt off insulin drip at this time.  Apprec endo recs.        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  -Tx w/ Entecavir- dose changed to q72 hours  given renal function.             VTE Risk Mitigation (From admission, onward)        Ordered     heparin (porcine) injection 5,000 Units  Every 8 hours      11/06/18 1021     IP VTE HIGH RISK PATIENT  Once      11/05/18 1145     Place sequential compression device  Until discontinued      10/05/18 0709          The patients clinical status was discussed at multidisplinary rounds, involving transplant surgery, transplant medicine, pharmacy, nursing, nutrition, and social work    Discharge Planning:  No Patient Care Coordination Note on file.      Elizabeth Dean PA-C  Liver Transplant  [facility]

## 2018-11-10 NOTE — CARE UPDATE
BG goal 140-180. BG at goal yesterday with decreased insulin doses. BG trending up today.     Increase Novolog to 8-10 units with meals; appetite remains variable each day depending on how she is feeling  Low dose correction scale given kidney function.  BG monitoring AC/HS      ** Please call Endocrine for any BG related issues **

## 2018-11-10 NOTE — ASSESSMENT & PLAN NOTE
- Creatinine on arrival 0.8 and trended up.  - Nephrology consulted.  - Renal US (10/11): no hydronephrosis.  - Echo 10/12: diastolic dysfunction, likely 2/2 to ARF.  - CRRT 10/14; SLED 10/17, 10/20. --> no improvements noted in kidney fxn.  - 24 hr urine creatine collection: CrCl of 7.   - CRRT 10/31 with 1L removed.   - Lasix 40 BID (restarted 11/1).  - Lasix on hold as of 11/3 per nephrology recs.  - Lasix restarted at 40 mg BID on 11/9 per nephrology recs, but renal fx worsening. Monitor.

## 2018-11-10 NOTE — PLAN OF CARE
Problem: Patient Care Overview  Goal: Plan of Care Review  Pt AAOx4, bed low locked, call light within reach, wearing nonskid socks. Pt instructed to use call light for assistance, pt verbalizes understanding.   Pt denies any pain or discomfort at this time. AC/HS, 307, covered with ss insulin. Chelisaon JULIANE with steri strips, weeping from R side. Pt remains free from injury/harm at this time. Afebrile. See flowsheet for full assessment/VS.

## 2018-11-10 NOTE — PROGRESS NOTES
"[facility]  Endocrinology  Progress Note    Admit Date: 10/1/2018     Reason for Consult: Management of type 2 DM, Hyperglycemia      Surgical Procedure and Date: Liver transplant 10/5/18, Ex lap 10/6/18     Diabetes diagnosis year:      Home Diabetes Medications:  Lantus 18 units HS and novolog 17 units with meals plus correction scale (150-200 +1)        Lab Results   Component Value Date     HGBA1C 6.8 (H) 2018        How often checking glucose at home? 3-4   BG readings on regimen: 120-150.  Post prandial readings as high as upper 200s  Hypoglycemia on the regimen? yes, none recently   Missed doses on regimen?  No     Diabetes Complications include:     Diabetic peripheral neuropathy      Complicating diabetes co morbidities:   CIRRHOSIS        HPI:  Patient is a 68 y.o. female with a diagnosis of ESLD, listed for liver transplant with meld 23 who underwent live transplant on 10/5/18.  Back to OR on 10/6/18 for ex lap.  Admitted 10/1/18 for hyponatremia s/p paracentesis.  Personal has known diagnosis of diabetes, endocrine consulted for diabetes management.    Post-operative course complicated by ESBL Klebsiella pneumoniae in urine (in contact isolation), peritransplant ascites, worsening renal function (required temporary dialysis), anemia (multiple blood transfusions), and possible GI bleed.     Interval HPI:   Overnight events: remains in TSU. BG above goal overnight and this AM. Prednisone tapered to 15 mg daily; steroid taper. Discharge planning  Eatin-  100%  Nausea: No  Hypoglycemia and intervention: No  Fever: No  TPN and/or TF: No      /70   Pulse 84   Temp 97.9 °F (36.6 °C) (Oral)   Resp 13   Ht 5' 3" (1.6 m)   Wt 68.4 kg (150 lb 12.7 oz)   LMP  (LMP Unknown)   SpO2 99%   Breastfeeding? No   BMI 26.71 kg/m²      Labs Reviewed and Include    Recent Labs   Lab 11/10/18  0600   *   CALCIUM 8.5*   ALBUMIN 3.4*   PROT 4.9*   *   K 4.8   CO2 21* "   CL 98   BUN 97*   CREATININE 2.6*   ALKPHOS 116   ALT 18   AST 21   BILITOT 1.7*     Lab Results   Component Value Date    WBC 7.20 11/10/2018    HGB 10.2 (L) 11/10/2018    HCT 31.1 (L) 11/10/2018    MCV 91 11/10/2018     (L) 11/10/2018     No results for input(s): TSH, FREET4 in the last 168 hours.  Lab Results   Component Value Date    HGBA1C 6.0 (H) 10/04/2018       Nutritional status:   Body mass index is 26.71 kg/m².  Lab Results   Component Value Date    ALBUMIN 3.4 (L) 11/10/2018    ALBUMIN 3.0 (L) 11/09/2018    ALBUMIN 2.7 (L) 11/08/2018     Lab Results   Component Value Date    PREALBUMIN 15 (L) 11/03/2018    PREALBUMIN 12 (L) 09/17/2018       Estimated Creatinine Clearance: 19 mL/min (A) (based on SCr of 2.6 mg/dL (H)).    Accu-Checks  Recent Labs     11/07/18  1934 11/07/18  2031 11/08/18  0821 11/08/18  1237 11/08/18  1659 11/08/18  2104 11/09/18  0825 11/09/18  1208 11/09/18  1803 11/09/18  2112   POCTGLUCOSE 138* 143* 96 153* 167* 157* 181* 304* 301* 307*       Current Medications and/or Treatments Impacting Glycemic Control  Immunotherapy:    Immunosuppressants         Stop Route Frequency     mycophenolate capsule 500 mg      -- Oral 2 times daily     tacrolimus capsule 1 mg      -- Oral 2 times daily        Steroids:   Hormones (From admission, onward)    Start     Stop Route Frequency Ordered    12/01/18 0900  predniSONE tablet 2.5 mg      12/08 0859 Oral Daily 11/05/18 1004    11/24/18 0900  predniSONE tablet 5 mg      12/01 0859 Oral Daily 11/05/18 1004    11/17/18 0900  predniSONE tablet 10 mg      11/24 0859 Oral Daily 11/05/18 1004    11/10/18 0900  predniSONE tablet 15 mg      11/17 0859 Oral Daily 11/05/18 1004    10/17/18 0945  predniSONE tablet 15 mg      10/24 0859 Oral Daily 10/17/18 0943        Pressors:    Autonomic Drugs (From admission, onward)    Start     Stop Route Frequency Ordered    10/10/18 1500  midodrine tablet 15 mg      -- Oral 3 times daily 10/10/18 112     10/06/18 1617  rocuronium (ZEMURON) 10 mg/mL injection     Comments:  Created by cabinet override    10/07 0429   10/06/18 1617        Hyperglycemia/Diabetes Medications:   Antihyperglycemics (From admission, onward)    Start     Stop Route Frequency Ordered    11/10/18 0715  insulin aspart U-100 pen 8-10 Units      -- SubQ 3 times daily with meals 11/09/18 1906    10/14/18 0925  insulin aspart U-100 pen 0-5 Units      -- SubQ Before meals & nightly PRN 10/14/18 0825    10/07/18 1200  insulin regular (Humulin R) 100 Units in sodium chloride 0.9% 100 mL infusion      10/13 2100 IV Continuous 10/07/18 1055          ASSESSMENT and PLAN    * Liver transplanted    Managed per primary team  Avoid hypoglycemia    Recent Labs   Lab 11/09/18  0558   ALT 13   AST 17   ALKPHOS 74   BILITOT 1.5*   PROT 4.1*   ALBUMIN 3.0*            Type 2 diabetes mellitus with diabetic polyneuropathy, with long-term current use of insulin    BG goal 140-180.     Increase Novolog to 8-10 units with meals; appetite remains variable each day depending on how she is feeling  Low dose correction scale given kidney function.  BG monitoring AC/HS    Start self/caregiver administration of insulin to review for home.     Discharge planning:  TBD     EVA (acute kidney injury)    Avoid insulin stacking and hypoglycemia.  Lab Results   Component Value Date    CREATININE 2.4 (H) 11/09/2018        Prophylactic immunotherapy    May increase insulin resistance.        Severe malnutrition    Oral intake encouraged, may affect BG readings         Aviva Caldera NP  Endocrinology  [facility]

## 2018-11-10 NOTE — ASSESSMENT & PLAN NOTE
- CRRT 10/14, 10/17, 10/20, 10/31.  - CXR 10/30: abundant fluid in b/l pleural space.  - Restarted Lasix 40 BID x 2 doses (11/2).  - Lasix held and then restarted 11/9 at 40 mg BID per nephrology recs. Monitor.

## 2018-11-11 LAB
ALBUMIN SERPL BCP-MCNC: 3.2 G/DL
ALP SERPL-CCNC: 109 U/L
ALT SERPL W/O P-5'-P-CCNC: 20 U/L
ANION GAP SERPL CALC-SCNC: 13 MMOL/L
AST SERPL-CCNC: 21 U/L
BASOPHILS # BLD AUTO: 0.02 K/UL
BASOPHILS NFR BLD: 0.3 %
BILIRUB SERPL-MCNC: 1.7 MG/DL
BUN SERPL-MCNC: 101 MG/DL
CALCIUM SERPL-MCNC: 8.5 MG/DL
CHLORIDE SERPL-SCNC: 96 MMOL/L
CO2 SERPL-SCNC: 21 MMOL/L
CREAT SERPL-MCNC: 2.6 MG/DL
DIFFERENTIAL METHOD: ABNORMAL
EOSINOPHIL # BLD AUTO: 0 K/UL
EOSINOPHIL NFR BLD: 0.3 %
ERYTHROCYTE [DISTWIDTH] IN BLOOD BY AUTOMATED COUNT: 17.5 %
EST. GFR  (AFRICAN AMERICAN): 20.9 ML/MIN/1.73 M^2
EST. GFR  (NON AFRICAN AMERICAN): 18.2 ML/MIN/1.73 M^2
GLUCOSE SERPL-MCNC: 204 MG/DL
HCT VFR BLD AUTO: 31 %
HGB BLD-MCNC: 10 G/DL
IMM GRANULOCYTES # BLD AUTO: 0.19 K/UL
IMM GRANULOCYTES NFR BLD AUTO: 2.6 %
INR PPP: 1.2
LYMPHOCYTES # BLD AUTO: 0.7 K/UL
LYMPHOCYTES NFR BLD: 9.2 %
MAGNESIUM SERPL-MCNC: 2.2 MG/DL
MCH RBC QN AUTO: 29.2 PG
MCHC RBC AUTO-ENTMCNC: 32.3 G/DL
MCV RBC AUTO: 90 FL
MONOCYTES # BLD AUTO: 0.9 K/UL
MONOCYTES NFR BLD: 11.8 %
NEUTROPHILS # BLD AUTO: 5.5 K/UL
NEUTROPHILS NFR BLD: 75.8 %
NRBC BLD-RTO: 0 /100 WBC
PHOSPHATE SERPL-MCNC: 3.6 MG/DL
PLATELET # BLD AUTO: 154 K/UL
PMV BLD AUTO: 10.4 FL
POCT GLUCOSE: 209 MG/DL (ref 70–110)
POCT GLUCOSE: 285 MG/DL (ref 70–110)
POCT GLUCOSE: 312 MG/DL (ref 70–110)
POCT GLUCOSE: 335 MG/DL (ref 70–110)
POTASSIUM SERPL-SCNC: 4.7 MMOL/L
PROT SERPL-MCNC: 4.9 G/DL
PROTHROMBIN TIME: 11.9 SEC
RBC # BLD AUTO: 3.43 M/UL
SODIUM SERPL-SCNC: 130 MMOL/L
TACROLIMUS BLD-MCNC: 10.1 NG/ML
WBC # BLD AUTO: 7.29 K/UL

## 2018-11-11 PROCEDURE — 25000003 PHARM REV CODE 250: Performed by: TRANSPLANT SURGERY

## 2018-11-11 PROCEDURE — 63600175 PHARM REV CODE 636 W HCPCS: Performed by: NURSE PRACTITIONER

## 2018-11-11 PROCEDURE — 25000003 PHARM REV CODE 250: Performed by: PHYSICIAN ASSISTANT

## 2018-11-11 PROCEDURE — 83735 ASSAY OF MAGNESIUM: CPT

## 2018-11-11 PROCEDURE — 94761 N-INVAS EAR/PLS OXIMETRY MLT: CPT

## 2018-11-11 PROCEDURE — 99233 SBSQ HOSP IP/OBS HIGH 50: CPT | Mod: 24,,, | Performed by: PHYSICIAN ASSISTANT

## 2018-11-11 PROCEDURE — 25000003 PHARM REV CODE 250: Performed by: NURSE PRACTITIONER

## 2018-11-11 PROCEDURE — 20600001 HC STEP DOWN PRIVATE ROOM

## 2018-11-11 PROCEDURE — 99900035 HC TECH TIME PER 15 MIN (STAT)

## 2018-11-11 PROCEDURE — 84100 ASSAY OF PHOSPHORUS: CPT

## 2018-11-11 PROCEDURE — 80197 ASSAY OF TACROLIMUS: CPT

## 2018-11-11 PROCEDURE — 85025 COMPLETE CBC W/AUTO DIFF WBC: CPT

## 2018-11-11 PROCEDURE — 94664 DEMO&/EVAL PT USE INHALER: CPT

## 2018-11-11 PROCEDURE — 85610 PROTHROMBIN TIME: CPT

## 2018-11-11 PROCEDURE — 80053 COMPREHEN METABOLIC PANEL: CPT

## 2018-11-11 RX ORDER — TACROLIMUS 0.5 MG/1
0.5 CAPSULE ORAL 2 TIMES DAILY
Status: DISCONTINUED | OUTPATIENT
Start: 2018-11-11 | End: 2018-11-19

## 2018-11-11 RX ADMIN — PANTOPRAZOLE SODIUM 40 MG: 40 TABLET, DELAYED RELEASE ORAL at 08:11

## 2018-11-11 RX ADMIN — MYCOPHENOLATE MOFETIL 500 MG: 250 CAPSULE ORAL at 08:11

## 2018-11-11 RX ADMIN — URSODIOL 300 MG: 300 CAPSULE ORAL at 08:11

## 2018-11-11 RX ADMIN — INSULIN ASPART 4 UNITS: 100 INJECTION, SOLUTION INTRAVENOUS; SUBCUTANEOUS at 05:11

## 2018-11-11 RX ADMIN — MIDODRINE HYDROCHLORIDE 15 MG: 5 TABLET ORAL at 09:11

## 2018-11-11 RX ADMIN — INSULIN ASPART 8 UNITS: 100 INJECTION, SOLUTION INTRAVENOUS; SUBCUTANEOUS at 08:11

## 2018-11-11 RX ADMIN — INSULIN ASPART 2 UNITS: 100 INJECTION, SOLUTION INTRAVENOUS; SUBCUTANEOUS at 10:11

## 2018-11-11 RX ADMIN — FLUCONAZOLE 200 MG: 200 TABLET ORAL at 08:11

## 2018-11-11 RX ADMIN — HEPARIN SODIUM 5000 UNITS: 5000 INJECTION, SOLUTION INTRAVENOUS; SUBCUTANEOUS at 08:11

## 2018-11-11 RX ADMIN — MAGNESIUM OXIDE TAB 400 MG (241.3 MG ELEMENTAL MG) 800 MG: 400 (241.3 MG) TAB at 08:11

## 2018-11-11 RX ADMIN — PREDNISONE 15 MG: 5 TABLET ORAL at 08:11

## 2018-11-11 RX ADMIN — ASPIRIN 81 MG CHEWABLE TABLET 81 MG: 81 TABLET CHEWABLE at 08:11

## 2018-11-11 RX ADMIN — MIDODRINE HYDROCHLORIDE 15 MG: 5 TABLET ORAL at 02:11

## 2018-11-11 RX ADMIN — MIDODRINE HYDROCHLORIDE 15 MG: 5 TABLET ORAL at 08:11

## 2018-11-11 RX ADMIN — ENTECAVIR 0.5 MG: 0.5 TABLET ORAL at 08:11

## 2018-11-11 RX ADMIN — INSULIN ASPART 3 UNITS: 100 INJECTION, SOLUTION INTRAVENOUS; SUBCUTANEOUS at 12:11

## 2018-11-11 RX ADMIN — HEPARIN SODIUM 5000 UNITS: 5000 INJECTION, SOLUTION INTRAVENOUS; SUBCUTANEOUS at 02:11

## 2018-11-11 RX ADMIN — TACROLIMUS 1 MG: 1 CAPSULE ORAL at 08:11

## 2018-11-11 RX ADMIN — DOCUSATE SODIUM 100 MG: 100 CAPSULE, LIQUID FILLED ORAL at 08:11

## 2018-11-11 RX ADMIN — ATOVAQUONE 1500 MG: 750 SUSPENSION ORAL at 08:11

## 2018-11-11 RX ADMIN — TACROLIMUS 0.5 MG: 0.5 CAPSULE ORAL at 05:11

## 2018-11-11 RX ADMIN — INSULIN ASPART 2 UNITS: 100 INJECTION, SOLUTION INTRAVENOUS; SUBCUTANEOUS at 08:11

## 2018-11-11 RX ADMIN — FUROSEMIDE 40 MG: 40 TABLET ORAL at 05:11

## 2018-11-11 RX ADMIN — INSULIN ASPART 10 UNITS: 100 INJECTION, SOLUTION INTRAVENOUS; SUBCUTANEOUS at 05:11

## 2018-11-11 RX ADMIN — INSULIN ASPART 10 UNITS: 100 INJECTION, SOLUTION INTRAVENOUS; SUBCUTANEOUS at 12:11

## 2018-11-11 RX ADMIN — FUROSEMIDE 40 MG: 40 TABLET ORAL at 08:11

## 2018-11-11 RX ADMIN — MIRTAZAPINE 7.5 MG: 7.5 TABLET ORAL at 08:11

## 2018-11-11 NOTE — ASSESSMENT & PLAN NOTE
- paracentesis 10/1, 5L removed, no fluid sent for analysis.  - pt remains volume overloaded.   - Liver US 11/6 with severe ascites.   - Paracentesis 11/7 with 8.9 L removed. Wbc 39, segs 16%. Gram stain - no organisms seen and no WBC. Aerobic culture no growth. Anaerobic and fungal cultures pending.   - Will plan for paracentesis tomorrow 11/12.

## 2018-11-11 NOTE — PROGRESS NOTES
[facility]  Liver Transplant  Progress Note    Patient Name: Scarlett Reddy  MRN: [id number]  Admission Date: 10/1/2018  Hospital Length of Stay: 40 days  Code Status: Full Code  Primary Care Provider: Primary Doctor No  Post-Operative Day: 37    ORGAN:   LIVER  Disease Etiology: Cirrhosis: Type B and D  Donor Type:    - Brain Death  CDC High Risk:   No  Donor CMV Status:   Donor CMV Status: Positive  Donor HBcAB:   Negative  Donor HCV Status:   Negative  Donor HBV SETH: Negative  Donor HCV SETH: Negative  Whole or Partial: Whole Liver  Biliary Anastomosis: End to End  Arterial Anatomy: Replaced Left Hepatic and Right Hepatic  Subjective:     History of Present Illness:  Ms. Reddy is a 67 y/o female with PMH of ESLD secondary Hep B and D.  Listed for liver transplant with MELD 23.  Paracentesis 10/1 with 5L removed, no fluid sent for analysis.  Morning labs significant for hyponatremia, Na 119.  Pt admitted to LTS for sodium monitoring.        Hospital Course:  Hospital Course: 67 y/o F h/o HBV/delta cirrhosis s/p DBDLT 10/5/18 (CMV D+/R+, steroid induction, MMF/tacro/pred maintenance) with take back on 10/6 2/2 hyperbilirubinema and bilious VALORIE drainage, no leak identified. Post-op course c/b hypercapneic and hypoxic respiratory failure and was reintubated though quickly extubated now doing well. Liver bx (10/6) sig for Zone 3 hemorrhage and collapse, in addition to cholestasis. Transferred from ICU on POD #4. ID consulted to comment on positive diphtheroid culture from ascitic fluid (likely skin colonization) as well as ESBL Klebsiella pneumoniae in urine culture (10/4).  Pt asymptomatic and cell count from ascitic fluid unremarkable. Per ID recs, no need to treat diphtheroids or asymptomatic ESBL Kleb pneumo bacteriuria though pt has had 6 days of therapy which would cover these organisms. Mild peritransplant ascites- repeat US 10/6 with increased arterial resistive indices. Repeat US 10/9  w/ continued elevation in RIs and fluid collections. LFTs trending down nicely.     Acute gradual worsening of renal function s/p OLTx.Creatinine on arrival 0.8 and has been up trending. Nephrology consulted for post-op EVA. Cr cont to trend up but remains to have good UOP. Had stable response to lasiz diuresis.  BUN elevated requiring multiple rounds of dialysis (10/15, 10/17, 10/20, 10/31). No significant improvements in kidney function noted.   In addition, pt H/H cont to fluctuate requiring multiple blood transfusions (10/14, 10/16, 10/18, 10/19, 10/22, 10/27, 10/29).  Pt reported dark stools 10/15 but color has normalized. FOBT+. EGD 10/18 consistent with ulcerated mucosa in duodenum s/p coagulation. Concern for H. Pylori. Started Amoxicillin/levofloxacin (10/19, complete 11/2)). Remains on Protonix 40 mg BID. Biopsy negative for infection and CMV. Will cont close monitoring H/H, transfuse as needed with goal Hb > 7.0.  Of note, Urine cx + Klebsiella pneumoniae (10/13). Received 3 day course of ciprofloxacin, dc'd 10/19.   On 10/19 pm pt c/o crushing chest pain. Troponin 0.094, likely 2/2 ischemic stress. EKG NSR.     Interval history: No acute events overnight. Paracentesis 11/7 with 8.9 L removed. Wbc 39, segs 16%. Gram stain no organisms seen + no WBC. Pt feels better since having fluid removed but she continues to have worsening abdominal distention & LE edema. Will plan for paracentesis again tomorrow. Continue 40 mg Lasix BID yesterday, per nephrology recs. However, renal fx not improving (Cr 2.6 ). No RRT today, but may be needed tomorrow. Poor PO intake. Liver US 11/6 showed no arterial flow. Repeat Liver US 11/8 showed arterial flow with increased RIs. Wbc decreased but stable. Valcyte remains on hold. Cellcept restarted at 500 mg bid on 11/6. CMV  on 11/1. CMV PCR pending, 11/8. Monitor.     Scheduled Meds:   aspirin  81 mg Oral Daily    atovaquone  1,500 mg Oral Daily    docusate  sodium  100 mg Oral Daily    entecavir  0.5 mg Oral Q72H    fluconazole  200 mg Oral Daily    furosemide  40 mg Oral BID    heparin (porcine)  5,000 Units Subcutaneous Q8H    insulin aspart U-100  8-10 Units Subcutaneous TIDWM    magnesium oxide  800 mg Oral BID    midodrine  15 mg Oral TID    mirtazapine  7.5 mg Oral QHS    mycophenolate  500 mg Oral BID    pantoprazole  40 mg Oral BID    polyethylene glycol  17 g Oral Daily    predniSONE  15 mg Oral Daily    Followed by    [START ON 11/17/2018] predniSONE  10 mg Oral Daily    Followed by    [START ON 11/24/2018] predniSONE  5 mg Oral Daily    Followed by    [START ON 12/1/2018] predniSONE  2.5 mg Oral Daily    tacrolimus  0.5 mg Oral BID    ursodiol  300 mg Oral BID     Continuous Infusions:  PRN Meds:bisacodyl, bisacodyl, calcium carbonate, dextrose 50%, dextrose 50%, glucagon (human recombinant), glucose, glucose, insulin aspart U-100, ondansetron, ondansetron, oxyCODONE, oxyCODONE, polyethylene glycol, promethazine (PHENERGAN) IVPB, sodium chloride 0.9%    Review of Systems   Constitutional: Positive for activity change, appetite change and fatigue. Negative for chills and fever.   HENT: Negative for congestion and facial swelling.    Eyes: Negative for pain, discharge and visual disturbance.   Respiratory: Negative for cough, chest tightness, shortness of breath and wheezing.    Cardiovascular: Positive for leg swelling. Negative for chest pain and palpitations.   Gastrointestinal: Positive for abdominal distention and abdominal pain. Negative for blood in stool, constipation, diarrhea, nausea and vomiting.   Endocrine: Negative.    Genitourinary: Negative for decreased urine volume, difficulty urinating and dysuria.   Musculoskeletal: Negative for arthralgias and back pain.   Skin: Positive for wound.   Allergic/Immunologic: Positive for immunocompromised state.   Neurological: Positive for weakness. Negative for tremors and headaches.    Psychiatric/Behavioral: Negative for confusion and sleep disturbance.     Objective:     Vital Signs (Most Recent):  Temp: 97.8 °F (36.6 °C) (11/10/18 1930)  Pulse: 71 (11/10/18 2345)  Resp: 13 (11/10/18 2345)  BP: 113/64 (11/11/18 0800)  SpO2: 98 % (11/11/18 0900) Vital Signs (24h Range):  Temp:  [97.5 °F (36.4 °C)-97.8 °F (36.6 °C)] 97.8 °F (36.6 °C)  Pulse:  [] 71  Resp:  [10-17] 13  SpO2:  [95 %-99 %] 98 %  BP: ()/(60-74) 113/64     Weight: 68.4 kg (150 lb 12.7 oz)  Body mass index is 26.71 kg/m².    Intake/Output - Last 3 Shifts       11/09 0700 - 11/10 0659 11/10 0700 - 11/11 0659 11/11 0700 - 11/12 0659    P.O. 400 180     I.V. (mL/kg) 0 (0)      Other 0      Total Intake(mL/kg) 400 (5.8) 180 (2.6)     Urine (mL/kg/hr) 750 (0.5) 150 (0.1)     Stool 0 0     Total Output 750 150     Net -350 +30            Urine Occurrence 0 x 1 x     Stool Occurrence 2 x 1 x           Physical Exam   Constitutional: She is oriented to person, place, and time. She appears well-developed. No distress.   Temporal and distal extremity muscle wasting   HENT:   Head: Normocephalic and atraumatic.   Eyes: EOM are normal. No scleral icterus.   Neck: Normal range of motion.   Cardiovascular: Normal rate and regular rhythm.   No murmur heard.  Pulmonary/Chest: Effort normal and breath sounds normal. No respiratory distress. She has no wheezes.   Abdominal: Soft. Bowel sounds are normal. She exhibits distension. There is tenderness. There is no guarding.   Chevron incision w/ steri strips, minimal serous leakage from R side  RLQ VALORIE drain sites c/d/i with sutures   Musculoskeletal: Normal range of motion. She exhibits edema (3+ BLE).   Neurological: She is alert and oriented to person, place, and time.   Skin: Skin is warm and dry. She is not diaphoretic.   Psychiatric: She has a normal mood and affect.   Nursing note and vitals reviewed.      Laboratory:  Immunosuppressants         Stop Route Frequency     tacrolimus  capsule 0.5 mg      -- Oral 2 times daily     mycophenolate capsule 500 mg      -- Oral 2 times daily        CBC:   Recent Labs   Lab 11/11/18  0557   WBC 7.29   RBC 3.43*   HGB 10.0*   HCT 31.0*      MCV 90   MCH 29.2   MCHC 32.3     CMP:   Recent Labs   Lab 11/11/18  0557   *   CALCIUM 8.5*   ALBUMIN 3.2*   PROT 4.9*   *   K 4.7   CO2 21*   CL 96   *   CREATININE 2.6*   ALKPHOS 109   ALT 20   AST 21   BILITOT 1.7*     Labs within the past 24 hours have been reviewed.    Diagnostic Results:  I have personally reviewed all pertinent imaging studies.    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   - POD 1 US: increased arterial resistive indices, suggestive of edema vs acute rejection vs congestion.  - Repeat US 10/9 with continued elevation of RIs.  - LFTs stable.  - T bili trending down.  - Repeat liver US (10/11): elevated RIs.  - Liver US 11/6 to assess for ascites. No arterial flow within the liver allograft concerning for interval thrombosis/occlusion of the arterial system. Severe ascites seen.  - Liver US 11/8: arterial flow seen w/ elevated RIs  - LFT remain stable. No CTA at this time due to EVA and normal liver function.        Thrombocytopenia, unspecified    - Improving.  - Monitor.       Alteration in skin integrity    Monitor.       Leukopenia    - Stopped Cellcept, Bactrim, and Valcyte 10/31--> will resume once WBC improves and stable.   - Started Atovaquone 11/6.   - CMV PCR noted to be positive at 129. Will hold off on treatment with valcyte at this time. Repeat CMV PCR pending, 11/8.          Azotemia    - CRRT 10/31 for uremic toxin clearance.           UTI (urinary tract infection)    - Urine Cx 10/15 w/ Klebsiella pneumonia.  - Started Ciprofloxacin (10/17-10/19).   - Started on amoxicillin/levo  for H. Pylori (completed abx 11/2).  - Pt asymptomatic at this time.       Acute blood loss anemia    - H&H cont to fluctuate.   - FOBT +.  - EGD 10/18 - showed diffuse bleeding with ulcerated duodenal mucosa.  - EGD 11/5 - improvement seen, no active bleeding.  - 1 unit PRBCs transfused 10/27 and 11/3.   - H&H with good response.   - Resume heparin sq injections 11/6.   - Continue to monitor.            Post LT - Acute renal failure    - Creatinine on arrival 0.8 and trended up.  - Nephrology consulted.  - Renal US (10/11): no hydronephrosis.  - Echo 10/12: diastolic dysfunction, likely 2/2 to ARF.  - CRRT 10/14; SLED 10/17, 10/20. --> no improvements noted in kidney fxn.  - 24 hr urine creatine collection: CrCl of 7.   - CRRT 10/31 with 1L removed.   - Lasix 40 BID (restarted 11/1).  - Lasix on hold as of 11/3 per nephrology recs.  - Lasix restarted at 40 mg BID on 11/9 per nephrology recs, but renal fx worsening. Monitor.     Hypervolemia associated with renal insufficiency    - CRRT 10/14, 10/17, 10/20, 10/31.  - CXR 10/30: abundant fluid in b/l pleural space.  - Restarted Lasix 40 BID x 2 doses (11/2).  - Lasix held and then restarted 11/9 at 40 mg BID per nephrology recs. Monitor.           EVA (acute kidney injury)    - 11/11: Kidney fx not improving.  ml + 1 unmeasured. Cr 2.6 . Possible RRT tomorrow, pending nephrology recs.   - Nephrology involved. Appreciate recs.  - Monitor.     Metabolic acidosis    - CRRT 10/14.  - SLED 10/17, 10/20, 10/31.         At risk for opportunistic infections    - Valcyte for CMV prophylaxis--> holding (10/31) for leukopenia. Pt will need weekly CMV PCR while discontinued. Next due, 11/15.  - Bactrim for PCP prophylaxis (MWF).--> holding (10/31) for leukopenia.   - Start Atovaquone 11/6.  - Fluconazole for fungal prophylaxis.         Long-term use of immunosuppressant medication    -  Prograf: stopped 10/29. Resumed 11/2.  --> Will monitor for signs of toxic side  effects, check daily troughs, and change meds accordingly.  - Prednisone: stopped 10/29. Started on Hydrocortisone. pred taper resumed.  - Cellcept: stopped 10/31 for leukopenia. Restarted Cellcept 500 mg bid, 11/6.         Prophylactic immunotherapy    - See long term use immunosuppresion.       Ascites    - paracentesis 10/1, 5L removed, no fluid sent for analysis.  - pt remains volume overloaded.   - Liver US 11/6 with severe ascites.   - Paracentesis 11/7 with 8.9 L removed. Wbc 39, segs 16%. Gram stain - no organisms seen and no WBC. Aerobic culture no growth. Anaerobic and fungal cultures pending.   - Will plan for paracentesis tomorrow 11/12.       Weakness    - see physical deconditioning.       Physical deconditioning    - PT/OT consulted.  - Recommend home health PT and rolling walker at discharge (orders placed 11/6).       Hyponatremia    - Na 119 on admit.  - Na was improving post transplant w HD.  - Na stable at this time.              Hypotension    - continue Midodrine 15 mg TID. Monitor.       Anemia requiring transfusions    - Fluctuating H&H.    - Transfusions: 10/14; 10/16; 10/18; 10/19; 10/22; 10/27; 10/29, 10/31  - FOBT +  - LDH elevated, Hapto < 10.  - EGD 10/18: ulcerated duodenal mucosa.   - Monitoring w/ CBC q12hr to assess the need for transfusion.  - H&H stable. No transfusion needed at this time.   - Consulted GI about this ongoing issue and further need of another EGD.  - EGD 11/5 unremarkable.          Severe malnutrition    - dietary consulted.   - temporal and distal extremity muscle wasting and edema.  - encourage supplements and to increase nutritional intake.  - per nephrology, pt to be on low protein diet.   - Prealbumin reviewed.      Type 2 diabetes mellitus with diabetic polyneuropathy, with long-term current use of insulin    - continue home regimen.  - endocrine consulted.  - Pt off insulin drip at this time.  Apprec endo recs.        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  -Tx w/ Entecavir- dose changed to q72 hours given renal function.             VTE Risk Mitigation (From admission, onward)        Ordered     heparin (porcine) injection 5,000 Units  Every 8 hours      11/06/18 1021     IP VTE HIGH RISK PATIENT  Once      11/05/18 1145     Place sequential compression device  Until discontinued      10/05/18 0709          The patients clinical status was discussed at multidisplinary rounds, involving transplant surgery, transplant medicine, pharmacy, nursing, nutrition, and social work    Discharge Planning:  No Patient Care Coordination Note on file.      Elizabeth Dean PA-C  Liver Transplant  [facility]

## 2018-11-11 NOTE — CARE UPDATE
BG goal 140-180.     Continue Novolog to 8-10 units with meals; appetite remains variable each day depending on how she is feeling  Low dose correction scale given kidney function.  BG monitoring AC/HS    Encourage self/caregiver administration of insulin to review for home.       ** Please call Endocrine for any BG related issues **

## 2018-11-11 NOTE — PLAN OF CARE
Problem: Patient Care Overview  Goal: Plan of Care Review  Pt AAOx4, bed low locked, call light within reach, wearing nonskid socks. Pt instructed to use call light for assistance, pt verbalizes understanding.   Pt c/o pain, PRN pain meds x1, no further complaints. AC/HS,291 , covered with ss insulin. Chevron JULIANE with steri strips, weeping from R side. Pt remains free from injury/harm at this time. Afebrile. See flowsheet for full assessment/VS.

## 2018-11-11 NOTE — SUBJECTIVE & OBJECTIVE
Scheduled Meds:   aspirin  81 mg Oral Daily    atovaquone  1,500 mg Oral Daily    docusate sodium  100 mg Oral Daily    entecavir  0.5 mg Oral Q72H    fluconazole  200 mg Oral Daily    furosemide  40 mg Oral BID    heparin (porcine)  5,000 Units Subcutaneous Q8H    insulin aspart U-100  8-10 Units Subcutaneous TIDWM    magnesium oxide  800 mg Oral BID    midodrine  15 mg Oral TID    mirtazapine  7.5 mg Oral QHS    mycophenolate  500 mg Oral BID    pantoprazole  40 mg Oral BID    polyethylene glycol  17 g Oral Daily    predniSONE  15 mg Oral Daily    Followed by    [START ON 11/17/2018] predniSONE  10 mg Oral Daily    Followed by    [START ON 11/24/2018] predniSONE  5 mg Oral Daily    Followed by    [START ON 12/1/2018] predniSONE  2.5 mg Oral Daily    tacrolimus  0.5 mg Oral BID    ursodiol  300 mg Oral BID     Continuous Infusions:  PRN Meds:bisacodyl, bisacodyl, calcium carbonate, dextrose 50%, dextrose 50%, glucagon (human recombinant), glucose, glucose, insulin aspart U-100, ondansetron, ondansetron, oxyCODONE, oxyCODONE, polyethylene glycol, promethazine (PHENERGAN) IVPB, sodium chloride 0.9%    Review of Systems   Constitutional: Positive for activity change, appetite change and fatigue. Negative for chills and fever.   HENT: Negative for congestion and facial swelling.    Eyes: Negative for pain, discharge and visual disturbance.   Respiratory: Negative for cough, chest tightness, shortness of breath and wheezing.    Cardiovascular: Positive for leg swelling. Negative for chest pain and palpitations.   Gastrointestinal: Positive for abdominal distention and abdominal pain. Negative for blood in stool, constipation, diarrhea, nausea and vomiting.   Endocrine: Negative.    Genitourinary: Negative for decreased urine volume, difficulty urinating and dysuria.   Musculoskeletal: Negative for arthralgias and back pain.   Skin: Positive for wound.   Allergic/Immunologic: Positive for  immunocompromised state.   Neurological: Positive for weakness. Negative for tremors and headaches.   Psychiatric/Behavioral: Negative for confusion and sleep disturbance.     Objective:     Vital Signs (Most Recent):  Temp: 97.8 °F (36.6 °C) (11/10/18 1930)  Pulse: 71 (11/10/18 2345)  Resp: 13 (11/10/18 2345)  BP: 113/64 (11/11/18 0800)  SpO2: 98 % (11/11/18 0900) Vital Signs (24h Range):  Temp:  [97.5 °F (36.4 °C)-97.8 °F (36.6 °C)] 97.8 °F (36.6 °C)  Pulse:  [] 71  Resp:  [10-17] 13  SpO2:  [95 %-99 %] 98 %  BP: ()/(60-74) 113/64     Weight: 68.4 kg (150 lb 12.7 oz)  Body mass index is 26.71 kg/m².    Intake/Output - Last 3 Shifts       11/09 0700 - 11/10 0659 11/10 0700 - 11/11 0659 11/11 0700 - 11/12 0659    P.O. 400 180     I.V. (mL/kg) 0 (0)      Other 0      Total Intake(mL/kg) 400 (5.8) 180 (2.6)     Urine (mL/kg/hr) 750 (0.5) 150 (0.1)     Stool 0 0     Total Output 750 150     Net -350 +30            Urine Occurrence 0 x 1 x     Stool Occurrence 2 x 1 x           Physical Exam   Constitutional: She is oriented to person, place, and time. She appears well-developed. No distress.   Temporal and distal extremity muscle wasting   HENT:   Head: Normocephalic and atraumatic.   Eyes: EOM are normal. No scleral icterus.   Neck: Normal range of motion.   Cardiovascular: Normal rate and regular rhythm.   No murmur heard.  Pulmonary/Chest: Effort normal and breath sounds normal. No respiratory distress. She has no wheezes.   Abdominal: Soft. Bowel sounds are normal. She exhibits distension. There is tenderness. There is no guarding.   Chevron incision w/ steri strips, minimal serous leakage from R side  RLQ VALORIE drain sites c/d/i with sutures   Musculoskeletal: Normal range of motion. She exhibits edema (3+ BLE).   Neurological: She is alert and oriented to person, place, and time.   Skin: Skin is warm and dry. She is not diaphoretic.   Psychiatric: She has a normal mood and affect.   Nursing note and  vitals reviewed.      Laboratory:  Immunosuppressants         Stop Route Frequency     tacrolimus capsule 0.5 mg      -- Oral 2 times daily     mycophenolate capsule 500 mg      -- Oral 2 times daily        CBC:   Recent Labs   Lab 11/11/18  0557   WBC 7.29   RBC 3.43*   HGB 10.0*   HCT 31.0*      MCV 90   MCH 29.2   MCHC 32.3     CMP:   Recent Labs   Lab 11/11/18  0557   *   CALCIUM 8.5*   ALBUMIN 3.2*   PROT 4.9*   *   K 4.7   CO2 21*   CL 96   *   CREATININE 2.6*   ALKPHOS 109   ALT 20   AST 21   BILITOT 1.7*     Labs within the past 24 hours have been reviewed.    Diagnostic Results:  I have personally reviewed all pertinent imaging studies.

## 2018-11-11 NOTE — ASSESSMENT & PLAN NOTE
BG goal 140-180.     Continue Novolog to 8-10 units with meals; appetite remains variable each day depending on how she is feeling   Low dose correction scale given kidney function.  BG monitoring AC/HS    Start self/caregiver administration of insulin to review for home.     Discharge planning:  [place]

## 2018-11-11 NOTE — PLAN OF CARE
Problem: Patient Care Overview  Goal: Plan of Care Review  Outcome: Ongoing (interventions implemented as appropriate)  Pt AAOx4, VSS. Na trending down at 130. Cr not coming down @ 2.6, BUN trending up @ 101. Spouse still not participating in self meds or administering insulin. Plan is for para tomorrow. Spouse at bedside. Bed in low/locked position, call light/personal belongings within reach, non-slip socks on when OOB, will cont to monitor.

## 2018-11-11 NOTE — ASSESSMENT & PLAN NOTE
- 11/11: Kidney fx not improving.  ml + 1 unmeasured. Cr 2.6 . Possible RRT tomorrow, pending nephrology recs.   - Nephrology involved. Appreciate recs.  - Monitor.

## 2018-11-12 PROBLEM — D61.811 OTHER DRUG-INDUCED PANCYTOPENIA: Status: ACTIVE | Noted: 2018-11-12

## 2018-11-12 LAB
ALBUMIN SERPL BCP-MCNC: 2.8 G/DL
ALP SERPL-CCNC: 115 U/L
ALT SERPL W/O P-5'-P-CCNC: 20 U/L
ANION GAP SERPL CALC-SCNC: 13 MMOL/L
APPEARANCE FLD: NORMAL
AST SERPL-CCNC: 21 U/L
BASOPHILS # BLD AUTO: 0.01 K/UL
BASOPHILS NFR BLD: 0.2 %
BILIRUB SERPL-MCNC: 1.5 MG/DL
BODY FLD TYPE: NORMAL
BUN SERPL-MCNC: 100 MG/DL
CALCIUM SERPL-MCNC: 8.2 MG/DL
CHLORIDE SERPL-SCNC: 97 MMOL/L
CO2 SERPL-SCNC: 21 MMOL/L
COLOR FLD: YELLOW
CREAT SERPL-MCNC: 2.5 MG/DL
DIFFERENTIAL METHOD: ABNORMAL
EOSINOPHIL # BLD AUTO: 0 K/UL
EOSINOPHIL NFR BLD: 0.2 %
ERYTHROCYTE [DISTWIDTH] IN BLOOD BY AUTOMATED COUNT: 17.2 %
EST. GFR  (AFRICAN AMERICAN): 21.9 ML/MIN/1.73 M^2
EST. GFR  (NON AFRICAN AMERICAN): 19 ML/MIN/1.73 M^2
GLUCOSE SERPL-MCNC: 132 MG/DL
GRAM STN SPEC: NORMAL
GRAM STN SPEC: NORMAL
HCT VFR BLD AUTO: 26.5 %
HGB BLD-MCNC: 8.5 G/DL
IMM GRANULOCYTES # BLD AUTO: 0.11 K/UL
IMM GRANULOCYTES NFR BLD AUTO: 1.8 %
INR PPP: 1
LYMPHOCYTES # BLD AUTO: 0.3 K/UL
LYMPHOCYTES NFR BLD: 4.9 %
LYMPHOCYTES NFR FLD MANUAL: 33 %
MAGNESIUM SERPL-MCNC: 2.1 MG/DL
MCH RBC QN AUTO: 28.8 PG
MCHC RBC AUTO-ENTMCNC: 32.1 G/DL
MCV RBC AUTO: 90 FL
MONOCYTES # BLD AUTO: 0.6 K/UL
MONOCYTES NFR BLD: 10.7 %
MONOS+MACROS NFR FLD MANUAL: 42 %
NEUTROPHILS # BLD AUTO: 4.9 K/UL
NEUTROPHILS NFR BLD: 82.2 %
NEUTROPHILS NFR FLD MANUAL: 25 %
NRBC BLD-RTO: 0 /100 WBC
PHOSPHATE SERPL-MCNC: 3.6 MG/DL
PLATELET # BLD AUTO: 117 K/UL
PMV BLD AUTO: 9.8 FL
POCT GLUCOSE: 103 MG/DL (ref 70–110)
POCT GLUCOSE: 141 MG/DL (ref 70–110)
POCT GLUCOSE: 148 MG/DL (ref 70–110)
POCT GLUCOSE: 211 MG/DL (ref 70–110)
POTASSIUM SERPL-SCNC: 4.5 MMOL/L
PROT SERPL-MCNC: 4.3 G/DL
PROTHROMBIN TIME: 10.4 SEC
RBC # BLD AUTO: 2.95 M/UL
SODIUM SERPL-SCNC: 131 MMOL/L
TACROLIMUS BLD-MCNC: 8.7 NG/ML
WBC # BLD AUTO: 5.97 K/UL
WBC # FLD: 34 /CU MM

## 2018-11-12 PROCEDURE — 83735 ASSAY OF MAGNESIUM: CPT

## 2018-11-12 PROCEDURE — 85025 COMPLETE CBC W/AUTO DIFF WBC: CPT

## 2018-11-12 PROCEDURE — 87205 SMEAR GRAM STAIN: CPT

## 2018-11-12 PROCEDURE — 84157 ASSAY OF PROTEIN OTHER: CPT

## 2018-11-12 PROCEDURE — 94761 N-INVAS EAR/PLS OXIMETRY MLT: CPT

## 2018-11-12 PROCEDURE — 63600175 PHARM REV CODE 636 W HCPCS: Mod: JG | Performed by: TRANSPLANT SURGERY

## 2018-11-12 PROCEDURE — 84100 ASSAY OF PHOSPHORUS: CPT

## 2018-11-12 PROCEDURE — 99900035 HC TECH TIME PER 15 MIN (STAT)

## 2018-11-12 PROCEDURE — 87070 CULTURE OTHR SPECIMN AEROBIC: CPT

## 2018-11-12 PROCEDURE — 99231 SBSQ HOSP IP/OBS SF/LOW 25: CPT | Mod: ,,, | Performed by: INTERNAL MEDICINE

## 2018-11-12 PROCEDURE — 20600001 HC STEP DOWN PRIVATE ROOM

## 2018-11-12 PROCEDURE — 99232 SBSQ HOSP IP/OBS MODERATE 35: CPT | Mod: ,,, | Performed by: NURSE PRACTITIONER

## 2018-11-12 PROCEDURE — P9047 ALBUMIN (HUMAN), 25%, 50ML: HCPCS | Mod: JG | Performed by: TRANSPLANT SURGERY

## 2018-11-12 PROCEDURE — 89051 BODY FLUID CELL COUNT: CPT

## 2018-11-12 PROCEDURE — 94664 DEMO&/EVAL PT USE INHALER: CPT

## 2018-11-12 PROCEDURE — 63600175 PHARM REV CODE 636 W HCPCS: Performed by: NURSE PRACTITIONER

## 2018-11-12 PROCEDURE — 87102 FUNGUS ISOLATION CULTURE: CPT

## 2018-11-12 PROCEDURE — 25000003 PHARM REV CODE 250: Performed by: TRANSPLANT SURGERY

## 2018-11-12 PROCEDURE — 25000003 PHARM REV CODE 250: Performed by: PHYSICIAN ASSISTANT

## 2018-11-12 PROCEDURE — 80053 COMPREHEN METABOLIC PANEL: CPT

## 2018-11-12 PROCEDURE — 87075 CULTR BACTERIA EXCEPT BLOOD: CPT

## 2018-11-12 PROCEDURE — 25000003 PHARM REV CODE 250: Performed by: NURSE PRACTITIONER

## 2018-11-12 PROCEDURE — 99233 SBSQ HOSP IP/OBS HIGH 50: CPT | Mod: 24,,, | Performed by: PHYSICIAN ASSISTANT

## 2018-11-12 PROCEDURE — 80197 ASSAY OF TACROLIMUS: CPT

## 2018-11-12 PROCEDURE — 84478 ASSAY OF TRIGLYCERIDES: CPT

## 2018-11-12 PROCEDURE — 25000003 PHARM REV CODE 250: Performed by: STUDENT IN AN ORGANIZED HEALTH CARE EDUCATION/TRAINING PROGRAM

## 2018-11-12 PROCEDURE — 85610 PROTHROMBIN TIME: CPT

## 2018-11-12 RX ORDER — FUROSEMIDE 40 MG/1
80 TABLET ORAL 2 TIMES DAILY
Status: DISCONTINUED | OUTPATIENT
Start: 2018-11-12 | End: 2018-11-14

## 2018-11-12 RX ORDER — ALBUMIN HUMAN 250 G/1000ML
25 SOLUTION INTRAVENOUS ONCE
Status: COMPLETED | OUTPATIENT
Start: 2018-11-12 | End: 2018-11-12

## 2018-11-12 RX ADMIN — FLUCONAZOLE 200 MG: 200 TABLET ORAL at 09:11

## 2018-11-12 RX ADMIN — HEPARIN SODIUM 5000 UNITS: 5000 INJECTION, SOLUTION INTRAVENOUS; SUBCUTANEOUS at 08:11

## 2018-11-12 RX ADMIN — TACROLIMUS 0.5 MG: 0.5 CAPSULE ORAL at 08:11

## 2018-11-12 RX ADMIN — INSULIN ASPART 10 UNITS: 100 INJECTION, SOLUTION INTRAVENOUS; SUBCUTANEOUS at 12:11

## 2018-11-12 RX ADMIN — ALBUMIN (HUMAN) 25 G: 25 SOLUTION INTRAVENOUS at 04:11

## 2018-11-12 RX ADMIN — ATOVAQUONE 1500 MG: 750 SUSPENSION ORAL at 09:11

## 2018-11-12 RX ADMIN — MIDODRINE HYDROCHLORIDE 15 MG: 5 TABLET ORAL at 02:11

## 2018-11-12 RX ADMIN — INSULIN ASPART 4 UNITS: 100 INJECTION, SOLUTION INTRAVENOUS; SUBCUTANEOUS at 07:11

## 2018-11-12 RX ADMIN — MAGNESIUM OXIDE TAB 400 MG (241.3 MG ELEMENTAL MG) 800 MG: 400 (241.3 MG) TAB at 09:11

## 2018-11-12 RX ADMIN — HEPARIN SODIUM 5000 UNITS: 5000 INJECTION, SOLUTION INTRAVENOUS; SUBCUTANEOUS at 05:11

## 2018-11-12 RX ADMIN — MIDODRINE HYDROCHLORIDE 15 MG: 5 TABLET ORAL at 09:11

## 2018-11-12 RX ADMIN — PREDNISONE 15 MG: 5 TABLET ORAL at 09:11

## 2018-11-12 RX ADMIN — INSULIN ASPART 10 UNITS: 100 INJECTION, SOLUTION INTRAVENOUS; SUBCUTANEOUS at 09:11

## 2018-11-12 RX ADMIN — PANTOPRAZOLE SODIUM 40 MG: 40 TABLET, DELAYED RELEASE ORAL at 09:11

## 2018-11-12 RX ADMIN — DOCUSATE SODIUM 100 MG: 100 CAPSULE, LIQUID FILLED ORAL at 09:11

## 2018-11-12 RX ADMIN — FUROSEMIDE 80 MG: 40 TABLET ORAL at 06:11

## 2018-11-12 RX ADMIN — MIDODRINE HYDROCHLORIDE 15 MG: 5 TABLET ORAL at 08:11

## 2018-11-12 RX ADMIN — MIRTAZAPINE 7.5 MG: 7.5 TABLET ORAL at 08:11

## 2018-11-12 RX ADMIN — INSULIN ASPART 1 UNITS: 100 INJECTION, SOLUTION INTRAVENOUS; SUBCUTANEOUS at 10:11

## 2018-11-12 RX ADMIN — URSODIOL 300 MG: 300 CAPSULE ORAL at 09:11

## 2018-11-12 RX ADMIN — TACROLIMUS 0.5 MG: 0.5 CAPSULE ORAL at 06:11

## 2018-11-12 RX ADMIN — PANTOPRAZOLE SODIUM 40 MG: 40 TABLET, DELAYED RELEASE ORAL at 08:11

## 2018-11-12 RX ADMIN — FUROSEMIDE 40 MG: 40 TABLET ORAL at 09:11

## 2018-11-12 RX ADMIN — MYCOPHENOLATE MOFETIL 500 MG: 250 CAPSULE ORAL at 09:11

## 2018-11-12 RX ADMIN — OXYCODONE HYDROCHLORIDE 5 MG: 5 TABLET ORAL at 10:11

## 2018-11-12 RX ADMIN — MYCOPHENOLATE MOFETIL 500 MG: 250 CAPSULE ORAL at 08:11

## 2018-11-12 RX ADMIN — URSODIOL 300 MG: 300 CAPSULE ORAL at 08:11

## 2018-11-12 RX ADMIN — MAGNESIUM OXIDE TAB 400 MG (241.3 MG ELEMENTAL MG) 800 MG: 400 (241.3 MG) TAB at 08:11

## 2018-11-12 RX ADMIN — ASPIRIN 81 MG CHEWABLE TABLET 81 MG: 81 TABLET CHEWABLE at 09:11

## 2018-11-12 NOTE — PROGRESS NOTES
[facility]  Liver Transplant  Progress Note    Patient Name: Scarlett Reddy  MRN: [id number]   Admission Date: 10/1/2018  Hospital Length of Stay: 41 days  Code Status: Full Code  Primary Care Provider: Primary Doctor No  Post-Operative Day: 38    ORGAN:   LIVER  Disease Etiology: Cirrhosis: Type B and D  Donor Type:    - Brain Death  CDC High Risk:   No  Donor CMV Status:   Donor CMV Status: Positive  Donor HBcAB:   Negative  Donor HCV Status:   Negative  Donor HBV SETH: Negative  Donor HCV SETH: Negative  Whole or Partial: Whole Liver  Biliary Anastomosis: End to End  Arterial Anatomy: Replaced Left Hepatic and Right Hepatic  Subjective:     History of Present Illness:  Ms. Reddy is a 69 y/o female with PMH of ESLD secondary Hep B and D.  Listed for liver transplant with MELD 23.  Paracentesis 10/1 with 5L removed, no fluid sent for analysis.  Morning labs significant for hyponatremia, Na 119.  Pt admitted to LTS for sodium monitoring.      Hospital Course: 69 y/o F h/o HBV/delta cirrhosis s/p DBDLT 10/5/18 (CMV D+/R+, steroid induction, MMF/tacro/pred maintenance) with take back on 10/6 2/2 hyperbilirubinema and bilious VALORIE drainage, no leak identified. Post-op course c/b hypercapneic and hypoxic respiratory failure and was reintubated though quickly extubated now doing well. Liver bx (10/6) sig for Zone 3 hemorrhage and collapse, in addition to cholestasis. Transferred from ICU on POD #4. ID consulted to comment on positive diphtheroid culture from ascitic fluid (likely skin colonization) as well as ESBL Klebsiella pneumoniae in urine culture (10/4).  Pt asymptomatic and cell count from ascitic fluid unremarkable. Per ID recs, no need to treat diphtheroids or asymptomatic ESBL Kleb pneumo bacteriuria though pt has had 6 days of therapy which would cover these organisms. Mild peritransplant ascites- repeat US 10/6 with increased arterial resistive indices. Repeat US 10/9 w/ continued  elevation in RIs and fluid collections. LFTs trending down nicely.     Acute gradual worsening of renal function s/p OLTx.Creatinine on arrival 0.8 and has been up trending. Nephrology consulted for post-op EVA. Cr cont to trend up but remains to have good UOP. Had stable response to lasiz diuresis.  BUN elevated requiring multiple rounds of dialysis (10/15, 10/17, 10/20, 10/31). No significant improvements in kidney function noted.   In addition, pt H/H cont to fluctuate requiring multiple blood transfusions (10/14, 10/16, 10/18, 10/19, 10/22, 10/27, 10/29).  Pt reported dark stools 10/15 but color has normalized. FOBT+. EGD 10/18 consistent with ulcerated mucosa in duodenum s/p coagulation. Concern for H. Pylori. Started Amoxicillin/levofloxacin (10/19, complete 11/2)). Remains on Protonix 40 mg BID. Biopsy negative for infection and CMV. Will cont close monitoring H/H, transfuse as needed with goal Hb > 7.0.  Of note, Urine cx + Klebsiella pneumoniae (10/13). Received 3 day course of ciprofloxacin, dc'd 10/19.   On 10/19 pm pt c/o crushing chest pain. Troponin 0.094, likely 2/2 ischemic stress. EKG NSR.   Liver US 11/6 showed no arterial flow. Repeat Liver US 11/8 showed arterial flow with increased RIs. Will Monitor. No plan for CTA 2/2 poor kidney function.  Paracentesis 11/7 with 8.9 L removed. Wbc 39, segs 16%. Gram stain no organisms seen + no WBC. Pt feels better since having fluid removed but she continues to have worsening abdominal distention & LE edema.     Interval history: No acute events overnight. Patient still retaining fluid with worsening abdominal distention & soft tissue edema. Paracentesis today.  Renal fx not improving, but stable (Cr. 2.5, ). Pt admits to slightly decreased urine output x 2-3 days. No RRT today. Patient has been on 40 mg Lasix BID x 3 days without improvement in fluid status. Will increase Lasix today to 80 mg BID and reassess tomorrow. Plan to remove trialysis line  tomorrow after determining if pt needs RRT (to reduce infection risk). Poor PO intake, but improving. Wbc decreased but stable. Cellcept restarted at 500 mg bid on 11/6. CMV  on 11/1. CMV  11/8. Continue to hold Valcyte. Liver US 11/6 showed no arterial flow, likely due to poor visualization 2/2 ascites. Post paracentesis repeat liver US 11/8 showed arterial flow with increased RIs. Will Monitor. No plan for CTA 2/2 poor kidney function. Plan for repeat liver u/s Thursday 11/15.    Scheduled Meds:   aspirin  81 mg Oral Daily    atovaquone  1,500 mg Oral Daily    docusate sodium  100 mg Oral Daily    entecavir  0.5 mg Oral Q72H    fluconazole  200 mg Oral Daily    furosemide  40 mg Oral BID    heparin (porcine)  5,000 Units Subcutaneous Q8H    insulin aspart U-100  8-10 Units Subcutaneous TIDWM    magnesium oxide  800 mg Oral BID    midodrine  15 mg Oral TID    mirtazapine  7.5 mg Oral QHS    mycophenolate  500 mg Oral BID    pantoprazole  40 mg Oral BID    polyethylene glycol  17 g Oral Daily    predniSONE  15 mg Oral Daily    Followed by    [START ON 11/17/2018] predniSONE  10 mg Oral Daily    Followed by    [START ON 11/24/2018] predniSONE  5 mg Oral Daily    Followed by    [START ON 12/1/2018] predniSONE  2.5 mg Oral Daily    tacrolimus  0.5 mg Oral BID    ursodiol  300 mg Oral BID     Continuous Infusions:  PRN Meds:bisacodyl, bisacodyl, calcium carbonate, dextrose 50%, dextrose 50%, glucagon (human recombinant), glucose, glucose, insulin aspart U-100, ondansetron, ondansetron, oxyCODONE, oxyCODONE, polyethylene glycol, promethazine (PHENERGAN) IVPB, sodium chloride 0.9%    Review of Systems   Constitutional: Positive for activity change, appetite change and fatigue. Negative for chills and fever.   HENT: Negative for congestion, facial swelling and trouble swallowing.    Eyes: Negative for pain, discharge and visual disturbance.   Respiratory: Negative for cough, chest  tightness, shortness of breath and wheezing.    Cardiovascular: Positive for leg swelling. Negative for chest pain and palpitations.   Gastrointestinal: Positive for abdominal distention and abdominal pain. Negative for blood in stool, constipation, diarrhea, nausea and vomiting.   Endocrine: Negative.    Genitourinary: Positive for decreased urine volume. Negative for difficulty urinating and dysuria.   Musculoskeletal: Negative for arthralgias, back pain, neck pain and neck stiffness.   Skin: Positive for wound.   Allergic/Immunologic: Positive for immunocompromised state.   Neurological: Positive for weakness. Negative for tremors and headaches.   Psychiatric/Behavioral: Negative for confusion and sleep disturbance.     Objective:     Vital Signs (Most Recent):  Temp: 97.3 °F (36.3 °C) (11/11/18 2000)  Pulse: 104 (11/12/18 0800)  Resp: 18 (11/12/18 0800)  BP: 137/78 (11/12/18 0800)  SpO2: 96 % (11/12/18 0800) Vital Signs (24h Range):  Temp:  [97.3 °F (36.3 °C)-97.6 °F (36.4 °C)] 97.3 °F (36.3 °C)  Pulse:  [] 104  Resp:  [11-25] 18  SpO2:  [96 %-99 %] 96 %  BP: (120-137)/(75-89) 137/78     Weight: 70.8 kg (156 lb 1.4 oz)  Body mass index is 27.65 kg/m².    Intake/Output - Last 3 Shifts       11/10 0700 - 11/11 0659 11/11 0700 - 11/12 0659 11/12 0700 - 11/13 0659    P.O. 180 180 180    I.V. (mL/kg)       Other       Total Intake(mL/kg) 180 (2.6) 180 (2.5) 180 (2.5)    Urine (mL/kg/hr) 150 (0.1)  500 (2.3)    Stool 0  0    Total Output 150  500    Net +30 +180 -320           Urine Occurrence 1 x 1 x     Stool Occurrence 1 x 0 x 1 x    Emesis Occurrence  0 x           Physical Exam   Constitutional: She is oriented to person, place, and time. She appears well-developed. No distress.   Temporal and distal extremity muscle wasting   HENT:   Head: Normocephalic and atraumatic.   Eyes: EOM are normal. No scleral icterus.   Neck: Normal range of motion.   Cardiovascular: Normal rate and regular rhythm.   No murmur  heard.  Pulmonary/Chest: Effort normal. No respiratory distress. She has no wheezes.   Decreased at bases   Abdominal: Bowel sounds are normal. She exhibits distension. There is tenderness. There is no guarding.   Chevron incision w/ steri strips, minimal serous leakage from R side   RLQ VALORIE drain sites c/d/i with sutures   Musculoskeletal: Normal range of motion. She exhibits edema (3+ BLE, + generalized).   Neurological: She is alert and oriented to person, place, and time.   Skin: Skin is warm. She is not diaphoretic.   Psychiatric: She has a normal mood and affect.   Nursing note and vitals reviewed.      Laboratory:  Immunosuppressants         Stop Route Frequency     tacrolimus capsule 0.5 mg      -- Oral 2 times daily     mycophenolate capsule 500 mg      -- Oral 2 times daily        CBC:   Recent Labs   Lab 11/12/18  0533   WBC 5.97   RBC 2.95*   HGB 8.5*   HCT 26.5*   *   MCV 90   MCH 28.8   MCHC 32.1     CMP:   Recent Labs   Lab 11/12/18  0533   *   CALCIUM 8.2*   ALBUMIN 2.8*   PROT 4.3*   *   K 4.5   CO2 21*   CL 97   *   CREATININE 2.5*   ALKPHOS 115   ALT 20   AST 21   BILITOT 1.5*     Labs within the past 24 hours have been reviewed.    Diagnostic Results:  I have personally reviewed all pertinent imaging studies.    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   - POD 1 US: increased arterial resistive indices, suggestive of edema vs acute rejection vs congestion.  - Repeat US 10/9 with continued elevation of RIs.  - LFTs stable.  - T bili trending down.   - Repeat liver US (10/11): elevated RIs.  - Liver US 11/6 to assess for ascites. No arterial flow within the liver allograft concerning for interval thrombosis/occlusion of the arterial system. Severe ascites seen.  - Liver US 11/8: arterial flow seen w/  "elevated RIs   - LFTs remain stable. No CTA at this time due to EVA and normal liver function.   - Plan for repeat liver US Thursday, 11/15.        Other drug-induced pancytopenia    - Decreased plt, wbc, h/h  - See "leukopenia"       Thrombocytopenia, unspecified    - Improving.  - Monitor.       Alteration in skin integrity    Monitor.       Leukopenia    - Stopped Cellcept, Bactrim, and Valcyte 10/31 2/2 leukopenia  - Started Atovaquone 11/6.   - CMV PCR noted to be positive at 129. Will hold off on treatment with valcyte at this time.  - CMV PCR 11/8: 105  - WBC 5.97 11/12  - Next CMV PCR due 11/15.        Azotemia    - CRRT 10/31 for uremic toxin clearance.           UTI (urinary tract infection)    - Urine Cx 10/15 w/ Klebsiella pneumonia.  - Started Ciprofloxacin (10/17-10/19).   - Started on amoxicillin/levo for H. Pylori (completed abx 11/2).  - Pt asymptomatic at this time.       Acute blood loss anemia    - H&H cont to fluctuate.   - FOBT +.  - EGD 10/18 - showed diffuse bleeding with ulcerated duodenal mucosa.  - EGD 11/5 - improvement seen, no active bleeding.  - 1 unit PRBCs transfused 10/27 and 11/3.   - H&H with good response.   - Resume heparin sq injections 11/6.   - Continue to monitor.             Post LT - Acute renal failure    - Creatinine on arrival 0.8 and trended up.  - Nephrology consulted.  - Renal US (10/11): no hydronephrosis.  - Echo 10/12: diastolic dysfunction, likely 2/2 to ARF.  - CRRT 10/14; SLED 10/17, 10/20. --> no improvements noted in kidney fxn.  - 24 hr urine creatine collection: CrCl of 7.   - CRRT 10/31 with 1L removed.   - See "hypervolemia associated with renal insufficiency"     Hypervolemia associated with renal insufficiency    - CRRT 10/14, 10/17, 10/20, 10/31.  - CXR 10/30: abundant fluid in b/l pleural space.  - Restarted Lasix 40 BID x 2 doses (11/2).  - Lasix held and then restarted 11/9 at 40 mg BID per nephrology recs.  - Pt not showing improvement in fluid " status. Will increase Lasix to 80 mg BID.  - Paracentesis today. Monitor.           EVA (acute kidney injury)    - 11/11: Renal fx not showing much improvement, but stable. Cr 2.5 (yest 2.6)  (yest 101). Possible RRT tomorrow, pending reassessment after para today.  - Nephrology involved. Appreciate recs.  - Monitor.     Metabolic acidosis    - CRRT 10/14.  - SLED 10/17, 10/20, 10/31.         At risk for opportunistic infections    - Valcyte for CMV prophylaxis--> holding (10/31) for leukopenia. Pt will need weekly CMV PCR while discontinued. Next due, 11/15.  - Bactrim for PCP prophylaxis (MWF).--> holding (10/31) for leukopenia.   - Start Atovaquone 11/6.  - Fluconazole for fungal prophylaxis.         Long-term use of immunosuppressant medication    - Prograf: stopped 10/29. Resumed 11/2.  --> Will monitor for signs of toxic side effects, check daily troughs, and change meds accordingly.  - Prednisone: stopped 10/29. Started on Hydrocortisone. pred taper resumed.  - Cellcept: stopped 10/31 for leukopenia. Restarted Cellcept 500 mg bid, 11/6.         Prophylactic immunotherapy    - See long term use immunosuppresion.       Ascites    - Paracentesis 10/1, 5L removed, no fluid sent for analysis.  - Pt remains volume overloaded.   - Liver US 11/6 with severe ascites.   - Paracentesis 11/7 with 8.9 L removed. Wbc 39, segs 16%. Gram stain - no organisms seen and no WBC. Aerobic culture no growth. Anaerobic and fungal cultures pending.   - Paracentesis today, 11/12.       Weakness    - See physical deconditioning.       Physical deconditioning    - PT/OT consulted.  - Recommend home health PT and rolling walker at discharge (orders placed 11/6).       Hyponatremia    - Na 119 on admit.  - Na was improving post transplant w HD.  - Na stable at this time.              Hypotension    - continue Midodrine 15 mg TID. Monitor.       Anemia requiring transfusions    - Fluctuating H&H.    - Transfusions: 10/14; 10/16;  10/18; 10/19; 10/22; 10/27; 10/29, 10/31  - FOBT +  - LDH elevated, Hapto < 10.  - EGD 10/18: ulcerated duodenal mucosa.   - Consulted GI about this ongoing issue and further need of another EGD.  - EGD 11/5 unremarkable.   - H/H decreased 11/12 - 8.5/26.5 from 10/31 - Will continue to monitor.       Severe malnutrition    - Dietary consulted.   - Temporal and distal extremity muscle wasting and edema.  - Encourage supplements and to increase nutritional intake.  - Per nephrology, pt to be on low protein diet.   - Prealbumin reviewed.      Type 2 diabetes mellitus with diabetic polyneuropathy, with long-term current use of insulin    - Continue home regimen.  - Endocrine consulted.  - Pt off insulin drip at this time.  Apprec endo recs.        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  - Tx w/ Entecavir- dose changed to q72 hours given renal function.             VTE Risk Mitigation (From admission, onward)        Ordered     heparin (porcine) injection 5,000 Units  Every 8 hours      11/06/18 1021     IP VTE HIGH RISK PATIENT  Once      11/05/18 1145     Place sequential compression device  Until discontinued      10/05/18 0709          The patients clinical status was discussed at multidisplinary rounds, involving transplant surgery, transplant medicine, pharmacy, nursing, nutrition, and social work    Discharge Planning:  No Patient Care Coordination Note on file.      Elizabeth Dean PA-C  Liver Transplant  [facility]

## 2018-11-12 NOTE — ASSESSMENT & PLAN NOTE
- 11/11: Renal fx not showing much improvement, but stable. Cr 2.5 (yest 2.6)  (yest 101). Possible RRT tomorrow, pending reassessment after para today.  - Nephrology involved. Appreciate recs.  - Monitor.

## 2018-11-12 NOTE — SUBJECTIVE & OBJECTIVE
Interval History:   No acute events overnight. Still endorsing significant lower extremity edema. UOP is not adequately charted. No fevers or chills overnight.     Review of patient's allergies indicates:  No Known Allergies  Current Facility-Administered Medications   Medication Frequency    aspirin chewable tablet 81 mg Daily    atovaquone suspension 1,500 mg Daily    bisacodyl EC tablet 10 mg Daily PRN    bisacodyl suppository 10 mg Daily PRN    calcium carbonate 200 mg calcium (500 mg) chewable tablet 500 mg TID PRN    dextrose 50% injection 12.5 g PRN    dextrose 50% injection 25 g PRN    docusate sodium capsule 100 mg Daily    entecavir tablet 0.5 mg Q72H    fluconazole tablet 200 mg Daily    furosemide tablet 80 mg BID    glucagon (human recombinant) injection 1 mg PRN    glucose chewable tablet 16 g PRN    glucose chewable tablet 24 g PRN    heparin (porcine) injection 5,000 Units Q8H    insulin aspart U-100 pen 0-5 Units QID (AC + HS) PRN    insulin aspart U-100 pen 8-10 Units TIDWM    magnesium oxide tablet 800 mg BID    midodrine tablet 15 mg TID    mirtazapine tablet 7.5 mg QHS    mycophenolate capsule 500 mg BID    ondansetron disintegrating tablet 8 mg Q8H PRN    ondansetron injection 4 mg Q8H PRN    oxyCODONE immediate release tablet 5 mg Q4H PRN    oxyCODONE immediate release tablet Tab 10 mg Q4H PRN    pantoprazole EC tablet 40 mg BID    polyethylene glycol packet 17 g Daily PRN    polyethylene glycol packet 17 g Daily    predniSONE tablet 15 mg Daily    Followed by    [START ON 11/17/2018] predniSONE tablet 10 mg Daily    Followed by    [START ON 11/24/2018] predniSONE tablet 5 mg Daily    Followed by    [START ON 12/1/2018] predniSONE tablet 2.5 mg Daily    promethazine (PHENERGAN) 6.25 mg in dextrose 5 % 50 mL IVPB Q6H PRN    sodium chloride 0.9% flush 3 mL PRN    tacrolimus capsule 0.5 mg BID    ursodiol capsule 300 mg BID       Objective:     Vital Signs  (Most Recent):  Temp: 97.8 °F (36.6 °C) (11/12/18 1203)  Pulse: 94 (11/12/18 1215)  Resp: 13 (11/12/18 1215)  BP: 132/83 (11/12/18 1215)  SpO2: 98 % (11/12/18 1215)  O2 Device (Oxygen Therapy): room air (11/12/18 1215) Vital Signs (24h Range):  Temp:  [97.3 °F (36.3 °C)-97.9 °F (36.6 °C)] 97.8 °F (36.6 °C)  Pulse:  [] 94  Resp:  [11-25] 13  SpO2:  [96 %-99 %] 98 %  BP: (120-137)/(75-89) 132/83     Weight: 70.8 kg (156 lb 1.4 oz) (11/12/18 0530)  Body mass index is 27.65 kg/m².  Body surface area is 1.77 meters squared.    I/O last 3 completed shifts:  In: 360 [P.O.:360]  Out: 150 [Urine:150]    Physical Exam   Constitutional: She is oriented to person, place, and time. No distress.   HENT:   Head: Normocephalic and atraumatic.   Cardiovascular: Normal rate and regular rhythm.   Pulmonary/Chest: Effort normal. No respiratory distress. She has decreased breath sounds.   Abdominal: Soft. She exhibits distension.   Musculoskeletal: She exhibits edema. She exhibits no tenderness.   Neurological: She is alert and oriented to person, place, and time.   Skin: Skin is warm and dry. She is not diaphoretic.   Psychiatric: She has a normal mood and affect. Her behavior is normal.       Significant Labs:  CMP:   Recent Labs   Lab 11/12/18  0533   *   CALCIUM 8.2*   ALBUMIN 2.8*   PROT 4.3*   *   K 4.5   CO2 21*   CL 97   *   CREATININE 2.5*   ALKPHOS 115   ALT 20   AST 21   BILITOT 1.5*     All labs within the past 24 hours have been reviewed.

## 2018-11-12 NOTE — PT/OT/SLP PROGRESS
Occupational Therapy      Patient Name:  Scarlett Reddy   MRN:  [id number]    Patient not seen today secondary to Unavailable (Team present speaking to pt and family). Unable to re-attempt in PM. Will follow up as scheduled.    Laura corado OT  11/12/2018

## 2018-11-12 NOTE — PLAN OF CARE
Problem: Diabetes, Type 2 (Adult)  Goal: Signs and Symptoms of Listed Potential Problems Will be Absent, Minimized or Managed (Diabetes, Type 2)  Signs and symptoms of listed potential problems will be absent, minimized or managed by discharge/transition of care (reference Diabetes, Type 2 (Adult) CPG).  Outcome: Ongoing (interventions implemented as appropriate)  -Blood glucose monitoring ac/hs. Night time - 2u SSI given per order.     Problem: Fall Risk (Adult)  Goal: Identify Related Risk Factors and Signs and Symptoms  Related risk factors and signs and symptoms are identified upon initiation of Human Response Clinical Practice Guideline (CPG)  Outcome: Ongoing (interventions implemented as appropriate)  -Up oob with 1 person assist.  at bedside attentive to pt needs.   -Pt ambulated in the hallway using a walker and is free from falls/injuries thus far this shift.   -Bed in lowest, locked position. Bed rails up x3. Call light and personal belongings within reach.     Problem: Patient Care Overview  Goal: Plan of Care Review  Outcome: Ongoing (interventions implemented as appropriate)  -Right IJ trialysis. Dressing CDI. Dressing due to be changed today.  - used PRN.   -Chevron incision dedrick with SS. Right side leaking small amount of serous fluid. Dressing changed and is CDI.   -Last para 11/07 and 8.9L removed. Plan for para today.   -BLE edema + 4. Pt receiving Lasix 40 mg BID. Nephrology following.   -CRRT PRN. Last CRRT 10/30. May need CRRT today due to CR trending up now 2.6.   -Pt still with poor po intake.   -Contact precautions for ESBL in urine. Completed 3 day tx of Cipro.   -Pt voiding in toilet and not measuring OP. Pt educated on importance of measuring urine.   -LFTs WNL.   -H/H stable 10.0/31.0.  -Caregiver () still refusing to pull self meds.   -No other issues overnight.   -Will continue to monitor.

## 2018-11-12 NOTE — PROGRESS NOTES
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 Post-op course c/b hypercapneic and hypoxic respiratory failure and was reintubated though quickly extubated now doing well. Liver bx (10/6) sig for Zone 3 hemorrhage and collapse, in addition to cholestasis. Transferred from ICU on POD #4. ID consulted to comment on positive diphtheroid culture from ascitic fluid (likely skin colonization) as well as ESBL Klebsiella pneumoniae in urine culture (10/4).  Pt asymptomatic and cell count from ascitic fluid unremarkable. Per ID recs, no need to treat diphtheroids or asymptomatic ESBL Kleb pneumo bacteriuria though pt has had 6 days of therapy which would cover these organisms. Mild peritransplant ascites- repeat US 10/6 with increased arterial resistive indices. Repeat US 10/9 w/ continued elevation in RIs and fluid collections. LFTs trending down nicely.      Acute gradual worsening of renal function s/p OLTx.Creatinine on arrival 0.8 and has been up trending. Nephrology consulted for post-op EVA. Cr cont to trend up but remains to have good UOP. Had stable response to lasiz diuresis.  BUN elevated requiring multiple rounds of dialysis (10/15, 10/17, 10/20, 10/31). No significant improvements in kidney function noted.   In addition, pt H/H cont to fluctuate requiring multiple blood transfusions (10/14, 10/16, 10/18, 10/19, 10/22, 10/27, 10/29).  Pt reported dark stools 10/15 but color has normalized. FOBT+. EGD 10/18 consistent with ulcerated mucosa in duodenum s/p  coagulation. Concern for H. Pylori. Started Amoxicillin/levofloxacin (10/19, complete 11/2)). Remains on Protonix 40 mg BID. Biopsy negative for infection and CMV. Will cont close monitoring H/H, transfuse as needed with goal Hb > 7.0.  Of note, Urine cx + Klebsiella pneumoniae (10/13). Received 3 day course of ciprofloxacin, dc'd 10/19.   On 10/19 pm pt c/o crushing chest pain. Troponin 0.094, likely 2/2 ischemic stress. EKG NSR.      11/5/18 - no acute events overnight. Pt reports feeling well this am. One unit of PRBCs given 11/3 with good response. Cr level noted with only minimal increases. Lasix placed on hold per nephrology recs. Will plan to hydrate today w/ albumin. EGD 11/5 unimpressive for overt bleed. Pt remains on protonix 40 BID. Denies melena. Cont to hold heparin. WBC cont to decrease. Immunosuppression managed with hydrocortisone 50 (cont to hold Cellcept, bactrim, valcyte). Prograf now restarted and transitioned IV steroids to PO prednisone on 11/3. CMV PCR noted with 129. Cont to monitor.     Interval History:   No acute events overnight. Still endorsing significant lower extremity edema. UOP is not adequately charted. No fevers or chills overnight.     Review of patient's allergies indicates:  No Known Allergies  Current Facility-Administered Medications   Medication Frequency    aspirin chewable tablet 81 mg Daily    atovaquone suspension 1,500 mg Daily    bisacodyl EC tablet 10 mg Daily PRN    bisacodyl suppository 10 mg Daily PRN    calcium carbonate 200 mg calcium (500 mg) chewable tablet 500 mg TID PRN    dextrose 50% injection 12.5 g PRN    dextrose 50% injection 25 g PRN    docusate sodium capsule 100 mg Daily    entecavir tablet 0.5 mg Q72H    fluconazole tablet 200 mg Daily    furosemide tablet 80 mg BID    glucagon (human recombinant) injection 1 mg PRN    glucose chewable tablet 16 g PRN    glucose chewable tablet 24 g PRN    heparin (porcine) injection 5,000 Units Q8H     insulin aspart U-100 pen 0-5 Units QID (AC + HS) PRN    insulin aspart U-100 pen 8-10 Units TIDWM    magnesium oxide tablet 800 mg BID    midodrine tablet 15 mg TID    mirtazapine tablet 7.5 mg QHS    mycophenolate capsule 500 mg BID    ondansetron disintegrating tablet 8 mg Q8H PRN    ondansetron injection 4 mg Q8H PRN    oxyCODONE immediate release tablet 5 mg Q4H PRN    oxyCODONE immediate release tablet Tab 10 mg Q4H PRN    pantoprazole EC tablet 40 mg BID    polyethylene glycol packet 17 g Daily PRN    polyethylene glycol packet 17 g Daily    predniSONE tablet 15 mg Daily    Followed by    [START ON 11/17/2018] predniSONE tablet 10 mg Daily    Followed by    [START ON 11/24/2018] predniSONE tablet 5 mg Daily    Followed by    [START ON 12/1/2018] predniSONE tablet 2.5 mg Daily    promethazine (PHENERGAN) 6.25 mg in dextrose 5 % 50 mL IVPB Q6H PRN    sodium chloride 0.9% flush 3 mL PRN    tacrolimus capsule 0.5 mg BID    ursodiol capsule 300 mg BID       Objective:     Vital Signs (Most Recent):  Temp: 97.8 °F (36.6 °C) (11/12/18 1203)  Pulse: 94 (11/12/18 1215)  Resp: 13 (11/12/18 1215)  BP: 132/83 (11/12/18 1215)  SpO2: 98 % (11/12/18 1215)  O2 Device (Oxygen Therapy): room air (11/12/18 1215) Vital Signs (24h Range):  Temp:  [97.3 °F (36.3 °C)-97.9 °F (36.6 °C)] 97.8 °F (36.6 °C)  Pulse:  [] 94  Resp:  [11-25] 13  SpO2:  [96 %-99 %] 98 %  BP: (120-137)/(75-89) 132/83     Weight: 70.8 kg (156 lb 1.4 oz) (11/12/18 0530)  Body mass index is 27.65 kg/m².  Body surface area is 1.77 meters squared.    I/O last 3 completed shifts:  In: 360 [P.O.:360]  Out: 150 [Urine:150]    Physical Exam   Constitutional: She is oriented to person, place, and time. No distress.   HENT:   Head: Normocephalic and atraumatic.   Cardiovascular: Normal rate and regular rhythm.   Pulmonary/Chest: Effort normal. No respiratory distress. She has decreased breath sounds.   Abdominal: Soft. She exhibits  distension.   Musculoskeletal: She exhibits edema. She exhibits no tenderness.   Neurological: She is alert and oriented to person, place, and time.   Skin: Skin is warm and dry. She is not diaphoretic.   Psychiatric: She has a normal mood and affect. Her behavior is normal.       Significant Labs:  CMP:   Recent Labs   Lab 11/12/18  0533   *   CALCIUM 8.2*   ALBUMIN 2.8*   PROT 4.3*   *   K 4.5   CO2 21*   CL 97   *   CREATININE 2.5*   ALKPHOS 115   ALT 20   AST 21   BILITOT 1.5*     All labs within the past 24 hours have been reviewed.       Assessment/Plan:      Post LT - Acute renal failure    Post-LT EVA is multifactorial in origin and has been related to the severity of liver disease, toxicity of immunosuppressive therapy and hepatic ischaemia reperfusion injury might be a driving force in the aetiology of post-LT EVA.     Plan: 11/12/18  - No indication for CRRT today   - Patient worsening azotemia likely secondary to her H/H dropping from GI bleed last week.   - stable azotemia  - No uremic symptoms or signs.   - Patient still with significant LE edema and minimal UOP - recommend increasing furosemide to 80 mg PO BID.          Thank you for your consult. I will follow-up with patient. Please contact us if you have any additional questions.    Edward Dye MD  PGY-3 Internal Medicine  [phone]    Nephrology  [facility]    ATTENDING PHYSICIAN ATTESTATION  I have personally interviewed and examined the patient. I thoroughly reviewed the demographic, clinical, laboratorial and imaging information available in medical records. I agree with the assessment and recommendations provided by the subspecialty resident. Dr. Dye was under my supervision.

## 2018-11-12 NOTE — NURSING
Returned from IR after Paracentesis completed, 8.5 Liters removed , dressing intact to  [facility]

## 2018-11-12 NOTE — ASSESSMENT & PLAN NOTE
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   - POD 1 US: increased arterial resistive indices, suggestive of edema vs acute rejection vs congestion.  - Repeat US 10/9 with continued elevation of RIs.  - LFTs stable.  - T bili trending down.   - Repeat liver US (10/11): elevated RIs.  - Liver US 11/6 to assess for ascites. No arterial flow within the liver allograft concerning for interval thrombosis/occlusion of the arterial system. Severe ascites seen.  - Liver US 11/8: arterial flow seen w/ elevated RIs   - LFTs remain stable. No CTA at this time due to EVA and normal liver function.   - Plan for repeat liver US Thursday, 11/15.

## 2018-11-12 NOTE — ASSESSMENT & PLAN NOTE
Avoid insulin stacking and hypoglycemia.  Lab Results   Component Value Date    CREATININE 2.5 (H) 11/12/2018

## 2018-11-12 NOTE — NURSING
Transport here to transport to IR for para,  to be called to assist with phone kun mcdowell accomapnied patient to procedure

## 2018-11-12 NOTE — ASSESSMENT & PLAN NOTE
"- Creatinine on arrival 0.8 and trended up.  - Nephrology consulted.  - Renal US (10/11): no hydronephrosis.  - Echo 10/12: diastolic dysfunction, likely 2/2 to ARF.  - CRRT 10/14; SLED 10/17, 10/20. --> no improvements noted in kidney fxn.  - 24 hr urine creatine collection: CrCl of 7.   - CRRT 10/31 with 1L removed.   - See "hypervolemia associated with renal insufficiency"  "

## 2018-11-12 NOTE — PT/OT/SLP PROGRESS
Physical Therapy      Patient Name:  Scarlett Reddy   MRN:  [id number]    Patient not seen today secondary to (Attempted in AM and PM. MD at bedside at AM attempt. Pt off floor at IR at PM attempt.). Will follow-up at next scheduled session as able.    Radha Nguyen, PT, DPT   11/12/2018  [phone]

## 2018-11-12 NOTE — ASSESSMENT & PLAN NOTE
- Fluctuating H&H.    - Transfusions: 10/14; 10/16; 10/18; 10/19; 10/22; 10/27; 10/29, 10/31  - FOBT +  - LDH elevated, Hapto < 10.  - EGD 10/18: ulcerated duodenal mucosa.   - Consulted GI about this ongoing issue and further need of another EGD.  - EGD 11/5 unremarkable.   - H/H decreased 11/12 - 8.5/26.5 from 10/31 - Will continue to monitor.

## 2018-11-12 NOTE — ASSESSMENT & PLAN NOTE
Managed per primary team  Avoid hypoglycemia    Recent Labs   Lab 11/12/18  0533   ALT 20   AST 21   ALKPHOS 115   BILITOT 1.5*   PROT 4.3*   ALBUMIN 2.8*

## 2018-11-12 NOTE — PROGRESS NOTES
Paracentesis complete, 8600 MLs removed. Specimen sent to lab. 200ml Albumin administered per protocol. Dressing applied to LLQ puncture site, dressing clean dry and intact. Report called to inpatient nurse , VALENTINA Valero. Patient in transport back to inpatient room.

## 2018-11-12 NOTE — ASSESSMENT & PLAN NOTE
- Paracentesis 10/1, 5L removed, no fluid sent for analysis.  - Pt remains volume overloaded.   - Liver US 11/6 with severe ascites.   - Paracentesis 11/7 with 8.9 L removed. Wbc 39, segs 16%. Gram stain - no organisms seen and no WBC. Aerobic culture no growth. Anaerobic and fungal cultures pending.   - Paracentesis today, 11/12.

## 2018-11-12 NOTE — ASSESSMENT & PLAN NOTE
- CRRT 10/14, 10/17, 10/20, 10/31.  - CXR 10/30: abundant fluid in b/l pleural space.  - Restarted Lasix 40 BID x 2 doses (11/2).  - Lasix held and then restarted 11/9 at 40 mg BID per nephrology recs.  - Pt not showing improvement in fluid status. Will increase Lasix to 80 mg BID.  - Paracentesis today. Monitor.

## 2018-11-12 NOTE — ASSESSMENT & PLAN NOTE
- Stopped Cellcept, Bactrim, and Valcyte 10/31 2/2 leukopenia  - Started Atovaquone 11/6.   - CMV PCR noted to be positive at 129. Will hold off on treatment with valcyte at this time.  - CMV PCR 11/8: 105  - WBC 5.97 11/12  - Next CMV PCR due 11/15.

## 2018-11-12 NOTE — ASSESSMENT & PLAN NOTE
- HBsAg+, Received HBIG (last weekly dose 11/2).  - Tx w/ Entecavir- dose changed to q72 hours given renal function.

## 2018-11-12 NOTE — SUBJECTIVE & OBJECTIVE
Scheduled Meds:   aspirin  81 mg Oral Daily    atovaquone  1,500 mg Oral Daily    docusate sodium  100 mg Oral Daily    entecavir  0.5 mg Oral Q72H    fluconazole  200 mg Oral Daily    furosemide  40 mg Oral BID    heparin (porcine)  5,000 Units Subcutaneous Q8H    insulin aspart U-100  8-10 Units Subcutaneous TIDWM    magnesium oxide  800 mg Oral BID    midodrine  15 mg Oral TID    mirtazapine  7.5 mg Oral QHS    mycophenolate  500 mg Oral BID    pantoprazole  40 mg Oral BID    polyethylene glycol  17 g Oral Daily    predniSONE  15 mg Oral Daily    Followed by    [START ON 11/17/2018] predniSONE  10 mg Oral Daily    Followed by    [START ON 11/24/2018] predniSONE  5 mg Oral Daily    Followed by    [START ON 12/1/2018] predniSONE  2.5 mg Oral Daily    tacrolimus  0.5 mg Oral BID    ursodiol  300 mg Oral BID     Continuous Infusions:  PRN Meds:bisacodyl, bisacodyl, calcium carbonate, dextrose 50%, dextrose 50%, glucagon (human recombinant), glucose, glucose, insulin aspart U-100, ondansetron, ondansetron, oxyCODONE, oxyCODONE, polyethylene glycol, promethazine (PHENERGAN) IVPB, sodium chloride 0.9%    Review of Systems   Constitutional: Positive for activity change, appetite change and fatigue. Negative for chills and fever.   HENT: Negative for congestion, facial swelling and trouble swallowing.    Eyes: Negative for pain, discharge and visual disturbance.   Respiratory: Negative for cough, chest tightness, shortness of breath and wheezing.    Cardiovascular: Positive for leg swelling. Negative for chest pain and palpitations.   Gastrointestinal: Positive for abdominal distention and abdominal pain. Negative for blood in stool, constipation, diarrhea, nausea and vomiting.   Endocrine: Negative.    Genitourinary: Positive for decreased urine volume. Negative for difficulty urinating and dysuria.   Musculoskeletal: Negative for arthralgias, back pain, neck pain and neck stiffness.   Skin: Positive  for wound.   Allergic/Immunologic: Positive for immunocompromised state.   Neurological: Positive for weakness. Negative for tremors and headaches.   Psychiatric/Behavioral: Negative for confusion and sleep disturbance.     Objective:     Vital Signs (Most Recent):  Temp: 97.3 °F (36.3 °C) (11/11/18 2000)  Pulse: 104 (11/12/18 0800)  Resp: 18 (11/12/18 0800)  BP: 137/78 (11/12/18 0800)  SpO2: 96 % (11/12/18 0800) Vital Signs (24h Range):  Temp:  [97.3 °F (36.3 °C)-97.6 °F (36.4 °C)] 97.3 °F (36.3 °C)  Pulse:  [] 104  Resp:  [11-25] 18  SpO2:  [96 %-99 %] 96 %  BP: (120-137)/(75-89) 137/78     Weight: 70.8 kg (156 lb 1.4 oz)  Body mass index is 27.65 kg/m².    Intake/Output - Last 3 Shifts       11/10 0700 - 11/11 0659 11/11 0700 - 11/12 0659 11/12 0700 - 11/13 0659    P.O. 180 180 180    I.V. (mL/kg)       Other       Total Intake(mL/kg) 180 (2.6) 180 (2.5) 180 (2.5)    Urine (mL/kg/hr) 150 (0.1)  500 (2.3)    Stool 0  0    Total Output 150  500    Net +30 +180 -320           Urine Occurrence 1 x 1 x     Stool Occurrence 1 x 0 x 1 x    Emesis Occurrence  0 x           Physical Exam   Constitutional: She is oriented to person, place, and time. She appears well-developed. No distress.   Temporal and distal extremity muscle wasting   HENT:   Head: Normocephalic and atraumatic.   Eyes: EOM are normal. No scleral icterus.   Neck: Normal range of motion.   Cardiovascular: Normal rate and regular rhythm.   No murmur heard.  Pulmonary/Chest: Effort normal. No respiratory distress. She has no wheezes.   Decreased at bases   Abdominal: Bowel sounds are normal. She exhibits distension. There is tenderness. There is no guarding.   Chevron incision w/ steri strips, minimal serous leakage from R side   RLQ VALORIE drain sites c/d/i with sutures   Musculoskeletal: Normal range of motion. She exhibits edema (3+ BLE, + generalized).   Neurological: She is alert and oriented to person, place, and time.   Skin: Skin is warm. She is  not diaphoretic.   Psychiatric: She has a normal mood and affect.   Nursing note and vitals reviewed.      Laboratory:  Immunosuppressants         Stop Route Frequency     tacrolimus capsule 0.5 mg      -- Oral 2 times daily     mycophenolate capsule 500 mg      -- Oral 2 times daily        CBC:   Recent Labs   Lab 11/12/18  0533   WBC 5.97   RBC 2.95*   HGB 8.5*   HCT 26.5*   *   MCV 90   MCH 28.8   MCHC 32.1     CMP:   Recent Labs   Lab 11/12/18  0533   *   CALCIUM 8.2*   ALBUMIN 2.8*   PROT 4.3*   *   K 4.5   CO2 21*   CL 97   *   CREATININE 2.5*   ALKPHOS 115   ALT 20   AST 21   BILITOT 1.5*     Labs within the past 24 hours have been reviewed.    Diagnostic Results:  I have personally reviewed all pertinent imaging studies.

## 2018-11-12 NOTE — H&P
Radiology History & Physical      SUBJECTIVE:     Chief Complaint: Abdominal distention    History of Present Illness:  Scarlett Reddy is a 68 y.o. female who presents for rule out SBP  Past Medical History:   Diagnosis Date    Anemia requiring transfusions 8/16/2018    Ascites     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 8/15/2018    Cirrhosis     Cough 8/30/2018    Esophageal varices     Esophageal varices without bleeding 8/15/2018    Hepatic encephalopathy     Hepatitis B     Hepatitis D virus infection     Hypersplenism     Hyponatremia     Hypotension     Liver transplant candidate     Neutropenia     Portal hypertension     Severe malnutrition 8/16/2018    Type 2 diabetes mellitus, with long-term current use of insulin 8/15/2018     Past Surgical History:   Procedure Laterality Date    EGD (ESOPHAGOGASTRODUODENOSCOPY) N/A 11/5/2018    Performed by Johann Mejia MD at [facility] (2ND FLR)    EGD (ESOPHAGOGASTRODUODENOSCOPY) N/A 10/18/2018    Performed by Chung Robins MD at [facility] (2ND FLR)    EGD (ESOPHAGOGASTRODUODENOSCOPY) N/A 8/17/2018    Performed by Travon Delgadillo MD at [facility] (2ND FLR)    ESOPHAGOGASTRODUODENOSCOPY N/A 8/17/2018    Procedure: EGD (ESOPHAGOGASTRODUODENOSCOPY);  Surgeon: Travon Delgadillo MD;  Location: [facility] ([address] [place] [address]);  Service: Endoscopy;  Laterality: N/A;    ESOPHAGOGASTRODUODENOSCOPY N/A 10/18/2018    Procedure: EGD (ESOPHAGOGASTRODUODENOSCOPY);  Surgeon: Chung Robins MD;  Location: [facility] ([address] [place] [address]);  Service: Endoscopy;  Laterality: N/A;    ESOPHAGOGASTRODUODENOSCOPY N/A 11/5/2018    Procedure: EGD (ESOPHAGOGASTRODUODENOSCOPY);  Surgeon: Johann Mejia MD;  Location: [facility] ([address] [place] [address]);  Service: Endoscopy;  Laterality: N/A;    EXPLORATORY LAPAROTOMY AFTER LIVER TRANSPLANTATION N/A 10/6/2018    Procedure: LAPAROTOMY, EXPLORATORY, AFTER LIVER TRANSPLANT;  Surgeon: Benson Matson MD;  [facility];  Service: Transplant;   Laterality: N/A;    LAPAROTOMY, EXPLORATORY, AFTER LIVER TRANSPLANT N/A 10/6/2018    Performed by Benson Matson MD at [facility] OR [address] [place] [address]    LIVER TRANSPLANT N/A 10/4/2018    Procedure: TRANSPLANT, LIVER;  Surgeon: Yony Burns MD;  Location: [facility] OR [address] [place] [address];  Service: Transplant;  Laterality: N/A;    TRANSPLANT, LIVER N/A 10/4/2018    Performed by Yony Burns MD at [facility] OR [address] [place] [address]       Home Meds:   Prior to Admission medications    Medication Sig Start Date End Date Taking? Authorizing Provider   insulin aspart U-100 (NOVOLOG FLEXPEN U-100 INSULIN) 100 unit/mL InPn pen Inject 20 units into the skin with breakfast,17 units with lunch, and 17 with dinner Plus correction scale, max TDD 60 units daily 9/27/18  Yes Seble Tineo NP   insulin glargine (LANTUS) 100 unit/mL injection Inject 20 Units into the skin every evening. 9/27/18  Yes Seble Tineo NP   midodrine (PROAMATINE) 5 MG Tab Take 3 tablets (15 mg total) by mouth every 8 (eight) hours. 8/24/18 2/20/19 Yes Gigi Sprague MD   OMEGA-3 FATTY ACIDS-EPA ORAL Take by mouth.   Yes Historical Provider, MD   rifAXIMin (XIFAXAN) 550 mg Tab Take 1 tablet (550 mg total) by mouth 2 (two) times daily. 8/27/18  Yes Rita Rubin NP   sodium bicarbonate 650 MG tablet Take 2 tablets (1,300 mg total) by mouth 3 (three) times daily. 9/21/18  Yes Gigi Sprague MD   blood sugar diagnostic (ACCU-CHEK DC) Strp 1 strip by Misc.(Non-Drug; Combo Route) route 3 (three) times daily.    Historical Provider, MD   entecavir (BARACLUDE) 0.5 MG Tab Take 1 tablet (0.5 mg total) by mouth once daily. 10/5/18   Benson Matson MD   ergocalciferol (VITAMIN D2) 50,000 unit Cap Take 1 capsule (50,000 Units total) by mouth every 7 days. 10/5/18   Benson Matson MD   famotidine (PEPCID) 20 MG tablet Take 1 tablet (20 mg total) by mouth once daily. 10/5/18   Benson Matson MD   mycophenolate (CELLCEPT) 250 mg Cap Take 4 capsules (1,000 mg total) by  "mouth 2 (two) times daily. 10/5/18   Benson Matson MD   pen needle, diabetic (BD ULTRA-FINE CITLALI PEN NEEDLE) 32 gauge x 5/32" Ndle To use to inject insulin 4x daily 9/14/18   Rita Rubin NP   predniSONE (DELTASONE) 10 MG tablet Take by mouth daily: 20mg 10/10-10/16, 15mg 10/17-10/23, 10mg 10/24-10/30, 5mg 10/31-11/6, 2.5mg 11/7-11/13, Stop 11/14 10/5/18   Benson Matson MD   sulfamethoxazole-trimethoprim 400-80mg (BACTRIM,SEPTRA) 400-80 mg per tablet Take 1 tablet by mouth once daily. STOP 4/3/19 10/5/18 4/3/19  Benson Matson MD   tacrolimus (PROGRAF) 1 MG Cap Take 6 capsules (6 mg total) by mouth every 12 (twelve) hours. 10/5/18   Benson Matson MD   valGANciclovir (VALCYTE) 450 mg Tab Take 1 tablet (450 mg total) by mouth once daily. STOP 1/3/19 10/5/18 1/3/19  Benson Matson MD     Anticoagulants/Antiplatelets: no anticoagulation    Allergies: Review of patient's allergies indicates:  No Known Allergies  Sedation History:  no adverse reactions    Review of Systems:   Hematological: no known coagulopathies  Respiratory: no shortness of breath  Cardiovascular: no chest pain  Gastrointestinal: no abdominal pain  Genito-Urinary: no dysuria  Musculoskeletal: negative  Neurological: no TIA or stroke symptoms         OBJECTIVE:     Vital Signs (Most Recent)  Temp: 97.8 °F (36.6 °C) (11/12/18 1203)  Pulse: 93 (11/12/18 1521)  Resp: 14 (11/12/18 1521)  BP: 125/76 (11/12/18 1521)  SpO2: 100 % (11/12/18 1521)    Physical Exam:  ASA: 2  Mallampati: na    General: no acute distress  Mental Status: alert and oriented to person, place and time  HEENT: normocephalic, atraumatic  Chest: unlabored breathing  Heart: regular heart rate  Abdomen: distended  Extremity: moves all extremities    Laboratory  Lab Results   Component Value Date    INR 1.0 11/12/2018       Lab Results   Component Value Date    WBC 5.97 11/12/2018    HGB 8.5 (L) 11/12/2018    HCT 26.5 (L) 11/12/2018    MCV 90 " 11/12/2018     (L) 11/12/2018      Lab Results   Component Value Date     (H) 11/12/2018     (L) 11/12/2018    K 4.5 11/12/2018    CL 97 11/12/2018    CO2 21 (L) 11/12/2018     (H) 11/12/2018    CREATININE 2.5 (H) 11/12/2018    CALCIUM 8.2 (L) 11/12/2018    MG 2.1 11/12/2018    ALT 20 11/12/2018    AST 21 11/12/2018    ALBUMIN 2.8 (L) 11/12/2018    BILITOT 1.5 (H) 11/12/2018    BILIDIR 11.8 (H) 10/08/2018       ASSESSMENT/PLAN:     Sedation Plan: local anesthesia  Patient will undergo US guided paracentesis

## 2018-11-12 NOTE — ASSESSMENT & PLAN NOTE
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     Plan: 11/12/18  - No indication for CRRT today   - Patient worsening azotemia likely secondary to her H/H dropping from GI bleed last week.   - stable azotemia  - No uremic symptoms or signs.   - Patient still with significant LE edema and minimal UOP - recommend increasing furosemide to 80 mg PO BID.

## 2018-11-12 NOTE — SUBJECTIVE & OBJECTIVE
"Interval HPI:   Overnight events:  Remains in TSU. NAEON. BG above goal yesterday; requiring correction scale. BG at goal this AM.   Eatin%   Nausea: No  Hypoglycemia and intervention: No  Fever: No  TPN and/or TF: No    /78   Pulse 104   Temp 97.3 °F (36.3 °C) (Oral)   Resp 18   Ht 5' 3" (1.6 m)   Wt 70.8 kg (156 lb 1.4 oz)   LMP  (LMP Unknown)   SpO2 96%   Breastfeeding? No   BMI 27.65 kg/m²     Labs Reviewed and Include    Recent Labs   Lab 18  0533   *   CALCIUM 8.2*   ALBUMIN 2.8*   PROT 4.3*   *   K 4.5   CO2 21*   CL 97   *   CREATININE 2.5*   ALKPHOS 115   ALT 20   AST 21   BILITOT 1.5*     Lab Results   Component Value Date    WBC 5.97 2018    HGB 8.5 (L) 2018    HCT 26.5 (L) 2018    MCV 90 2018     (L) 2018     No results for input(s): TSH, FREET4 in the last 168 hours.  Lab Results   Component Value Date    HGBA1C 6.0 (H) 10/04/2018       Nutritional status:   Body mass index is 27.65 kg/m².  Lab Results   Component Value Date    ALBUMIN 2.8 (L) 2018    ALBUMIN 3.2 (L) 2018    ALBUMIN 3.4 (L) 11/10/2018     Lab Results   Component Value Date    PREALBUMIN 15 (L) 2018    PREALBUMIN 12 (L) 2018       Estimated Creatinine Clearance: 20 mL/min (A) (based on SCr of 2.5 mg/dL (H)).    Accu-Checks  Recent Labs     18  2112 11/10/18  0812 11/10/18  1146 11/10/18  1625 11/10/18  2126 18  0826 18  1146 18  1747 18  2235 18  0816   POCTGLUCOSE 307* 230* 298* 261* 291* 209* 285* 312* 335* 141*       Current Medications and/or Treatments Impacting Glycemic Control  Immunotherapy:    Immunosuppressants         Stop Route Frequency     tacrolimus capsule 0.5 mg      -- Oral 2 times daily     mycophenolate capsule 500 mg      -- Oral 2 times daily        Steroids:   Hormones (From admission, onward)    Start     Stop Route Frequency Ordered    18 0900  predniSONE tablet " 2.5 mg      12/08 0859 Oral Daily 11/05/18 1004    11/24/18 0900  predniSONE tablet 5 mg      12/01 0859 Oral Daily 11/05/18 1004    11/17/18 0900  predniSONE tablet 10 mg      11/24 0859 Oral Daily 11/05/18 1004    11/10/18 0900  predniSONE tablet 15 mg      11/17 0859 Oral Daily 11/05/18 1004    10/17/18 0945  predniSONE tablet 15 mg      10/24 0859 Oral Daily 10/17/18 0943        Pressors:    Autonomic Drugs (From admission, onward)    Start     Stop Route Frequency Ordered    10/10/18 1500  midodrine tablet 15 mg      -- Oral 3 times daily 10/10/18 1122    10/06/18 1617  rocuronium (ZEMURON) 10 mg/mL injection     Comments:  Created by cabinet override    10/07 0429   10/06/18 1617        Hyperglycemia/Diabetes Medications:   Antihyperglycemics (From admission, onward)    Start     Stop Route Frequency Ordered    11/10/18 0715  insulin aspart U-100 pen 8-10 Units      -- SubQ 3 times daily with meals 11/09/18 1906    10/14/18 0925  insulin aspart U-100 pen 0-5 Units      -- SubQ Before meals & nightly PRN 10/14/18 0825    10/07/18 1200  insulin regular (Humulin R) 100 Units in sodium chloride 0.9% 100 mL infusion      10/13 2100 IV Continuous 10/07/18 105

## 2018-11-13 LAB
ABO + RH BLD: NORMAL
ALBUMIN SERPL BCP-MCNC: 3.1 G/DL
ALP SERPL-CCNC: 84 U/L
ALT SERPL W/O P-5'-P-CCNC: 16 U/L
ANION GAP SERPL CALC-SCNC: 11 MMOL/L
AST SERPL-CCNC: 19 U/L
BASOPHILS # BLD AUTO: 0 K/UL
BASOPHILS NFR BLD: 0 %
BILIRUB SERPL-MCNC: 1.2 MG/DL
BLD GP AB SCN CELLS X3 SERPL QL: NORMAL
BLOOD GROUP ANTIBODIES SERPL: NORMAL
BUN SERPL-MCNC: 105 MG/DL
CALCIUM SERPL-MCNC: 8.4 MG/DL
CHLORIDE SERPL-SCNC: 96 MMOL/L
CO2 SERPL-SCNC: 23 MMOL/L
CREAT SERPL-MCNC: 2.6 MG/DL
DIFFERENTIAL METHOD: ABNORMAL
EOSINOPHIL # BLD AUTO: 0 K/UL
EOSINOPHIL NFR BLD: 0.3 %
ERYTHROCYTE [DISTWIDTH] IN BLOOD BY AUTOMATED COUNT: 17.3 %
EST. GFR  (AFRICAN AMERICAN): 20.9 ML/MIN/1.73 M^2
EST. GFR  (NON AFRICAN AMERICAN): 18.2 ML/MIN/1.73 M^2
FERRITIN SERPL-MCNC: 1941 NG/ML
GLUCOSE SERPL-MCNC: 153 MG/DL
HAPTOGLOB SERPL-MCNC: 132 MG/DL
HCT VFR BLD AUTO: 22.6 %
HCT VFR BLD AUTO: 27.5 %
HGB BLD-MCNC: 7.2 G/DL
HGB BLD-MCNC: 8.7 G/DL
IMM GRANULOCYTES # BLD AUTO: 0.07 K/UL
IMM GRANULOCYTES NFR BLD AUTO: 1.9 %
INR PPP: 1
IRON SERPL-MCNC: 95 UG/DL
LDH SERPL L TO P-CCNC: 145 U/L
LYMPHOCYTES # BLD AUTO: 0.2 K/UL
LYMPHOCYTES NFR BLD: 6.2 %
MAGNESIUM SERPL-MCNC: 2.2 MG/DL
MCH RBC QN AUTO: 28.9 PG
MCHC RBC AUTO-ENTMCNC: 31.9 G/DL
MCV RBC AUTO: 91 FL
MONOCYTES # BLD AUTO: 0.3 K/UL
MONOCYTES NFR BLD: 8.9 %
NEUTROPHILS # BLD AUTO: 3.1 K/UL
NEUTROPHILS NFR BLD: 82.7 %
NRBC BLD-RTO: 0 /100 WBC
PHOSPHATE SERPL-MCNC: 3.6 MG/DL
PLATELET # BLD AUTO: 82 K/UL
PMV BLD AUTO: 10.8 FL
POCT GLUCOSE: 155 MG/DL (ref 70–110)
POCT GLUCOSE: 179 MG/DL (ref 70–110)
POCT GLUCOSE: 196 MG/DL (ref 70–110)
POCT GLUCOSE: 244 MG/DL (ref 70–110)
POTASSIUM SERPL-SCNC: 4.4 MMOL/L
PROT FLD-MCNC: 2.7 G/DL
PROT SERPL-MCNC: 4.3 G/DL
PROTHROMBIN TIME: 10.8 SEC
RBC # BLD AUTO: 2.49 M/UL
RETICS/RBC NFR AUTO: 3.9 %
SATURATED IRON: 89 %
SODIUM SERPL-SCNC: 130 MMOL/L
SPECIMEN SOURCE: NORMAL
TACROLIMUS BLD-MCNC: 5.5 NG/ML
TOTAL IRON BINDING CAPACITY: 107 UG/DL
TRANSFERRIN SERPL-MCNC: 72 MG/DL
WBC # BLD AUTO: 3.71 K/UL

## 2018-11-13 PROCEDURE — 86870 RBC ANTIBODY IDENTIFICATION: CPT

## 2018-11-13 PROCEDURE — 63600175 PHARM REV CODE 636 W HCPCS: Performed by: NURSE PRACTITIONER

## 2018-11-13 PROCEDURE — 82728 ASSAY OF FERRITIN: CPT

## 2018-11-13 PROCEDURE — 83735 ASSAY OF MAGNESIUM: CPT

## 2018-11-13 PROCEDURE — 85610 PROTHROMBIN TIME: CPT

## 2018-11-13 PROCEDURE — 25000003 PHARM REV CODE 250: Performed by: PHYSICIAN ASSISTANT

## 2018-11-13 PROCEDURE — 99900035 HC TECH TIME PER 15 MIN (STAT)

## 2018-11-13 PROCEDURE — 97110 THERAPEUTIC EXERCISES: CPT

## 2018-11-13 PROCEDURE — 83540 ASSAY OF IRON: CPT

## 2018-11-13 PROCEDURE — 85018 HEMOGLOBIN: CPT

## 2018-11-13 PROCEDURE — 94799 UNLISTED PULMONARY SVC/PX: CPT

## 2018-11-13 PROCEDURE — 83010 ASSAY OF HAPTOGLOBIN QUANT: CPT

## 2018-11-13 PROCEDURE — 85045 AUTOMATED RETICULOCYTE COUNT: CPT

## 2018-11-13 PROCEDURE — 25000003 PHARM REV CODE 250: Performed by: NURSE PRACTITIONER

## 2018-11-13 PROCEDURE — 86850 RBC ANTIBODY SCREEN: CPT

## 2018-11-13 PROCEDURE — 94761 N-INVAS EAR/PLS OXIMETRY MLT: CPT

## 2018-11-13 PROCEDURE — 20600001 HC STEP DOWN PRIVATE ROOM

## 2018-11-13 PROCEDURE — 80197 ASSAY OF TACROLIMUS: CPT

## 2018-11-13 PROCEDURE — 84100 ASSAY OF PHOSPHORUS: CPT

## 2018-11-13 PROCEDURE — 25000003 PHARM REV CODE 250: Performed by: TRANSPLANT SURGERY

## 2018-11-13 PROCEDURE — 85025 COMPLETE CBC W/AUTO DIFF WBC: CPT

## 2018-11-13 PROCEDURE — 36415 COLL VENOUS BLD VENIPUNCTURE: CPT

## 2018-11-13 PROCEDURE — 80053 COMPREHEN METABOLIC PANEL: CPT

## 2018-11-13 PROCEDURE — 99231 SBSQ HOSP IP/OBS SF/LOW 25: CPT | Mod: ,,, | Performed by: INTERNAL MEDICINE

## 2018-11-13 PROCEDURE — 85014 HEMATOCRIT: CPT

## 2018-11-13 PROCEDURE — 94664 DEMO&/EVAL PT USE INHALER: CPT

## 2018-11-13 PROCEDURE — 99233 SBSQ HOSP IP/OBS HIGH 50: CPT | Mod: 24,,, | Performed by: PHYSICIAN ASSISTANT

## 2018-11-13 PROCEDURE — 83615 LACTATE (LD) (LDH) ENZYME: CPT

## 2018-11-13 RX ADMIN — INSULIN ASPART 10 UNITS: 100 INJECTION, SOLUTION INTRAVENOUS; SUBCUTANEOUS at 06:11

## 2018-11-13 RX ADMIN — FLUCONAZOLE 200 MG: 200 TABLET ORAL at 09:11

## 2018-11-13 RX ADMIN — FUROSEMIDE 80 MG: 40 TABLET ORAL at 05:11

## 2018-11-13 RX ADMIN — PANTOPRAZOLE SODIUM 40 MG: 40 TABLET, DELAYED RELEASE ORAL at 09:11

## 2018-11-13 RX ADMIN — MAGNESIUM OXIDE TAB 400 MG (241.3 MG ELEMENTAL MG) 800 MG: 400 (241.3 MG) TAB at 09:11

## 2018-11-13 RX ADMIN — PANTOPRAZOLE SODIUM 40 MG: 40 TABLET, DELAYED RELEASE ORAL at 08:11

## 2018-11-13 RX ADMIN — URSODIOL 300 MG: 300 CAPSULE ORAL at 08:11

## 2018-11-13 RX ADMIN — HEPARIN SODIUM 5000 UNITS: 5000 INJECTION, SOLUTION INTRAVENOUS; SUBCUTANEOUS at 06:11

## 2018-11-13 RX ADMIN — MIRTAZAPINE 7.5 MG: 7.5 TABLET ORAL at 08:11

## 2018-11-13 RX ADMIN — PREDNISONE 15 MG: 5 TABLET ORAL at 09:11

## 2018-11-13 RX ADMIN — FUROSEMIDE 80 MG: 40 TABLET ORAL at 10:11

## 2018-11-13 RX ADMIN — MIDODRINE HYDROCHLORIDE 15 MG: 5 TABLET ORAL at 03:11

## 2018-11-13 RX ADMIN — TACROLIMUS 0.5 MG: 0.5 CAPSULE ORAL at 05:11

## 2018-11-13 RX ADMIN — INSULIN ASPART 8 UNITS: 100 INJECTION, SOLUTION INTRAVENOUS; SUBCUTANEOUS at 09:11

## 2018-11-13 RX ADMIN — URSODIOL 300 MG: 300 CAPSULE ORAL at 09:11

## 2018-11-13 RX ADMIN — ATOVAQUONE 1500 MG: 750 SUSPENSION ORAL at 10:11

## 2018-11-13 RX ADMIN — MAGNESIUM OXIDE TAB 400 MG (241.3 MG ELEMENTAL MG) 800 MG: 400 (241.3 MG) TAB at 08:11

## 2018-11-13 RX ADMIN — MYCOPHENOLATE MOFETIL 500 MG: 250 CAPSULE ORAL at 08:11

## 2018-11-13 RX ADMIN — ASPIRIN 81 MG CHEWABLE TABLET 81 MG: 81 TABLET CHEWABLE at 09:11

## 2018-11-13 RX ADMIN — MIDODRINE HYDROCHLORIDE 15 MG: 5 TABLET ORAL at 08:11

## 2018-11-13 RX ADMIN — HEPARIN SODIUM 5000 UNITS: 5000 INJECTION, SOLUTION INTRAVENOUS; SUBCUTANEOUS at 08:11

## 2018-11-13 RX ADMIN — MYCOPHENOLATE MOFETIL 500 MG: 250 CAPSULE ORAL at 09:11

## 2018-11-13 RX ADMIN — DOCUSATE SODIUM 100 MG: 100 CAPSULE, LIQUID FILLED ORAL at 09:11

## 2018-11-13 RX ADMIN — INSULIN ASPART 2 UNITS: 100 INJECTION, SOLUTION INTRAVENOUS; SUBCUTANEOUS at 01:11

## 2018-11-13 RX ADMIN — TACROLIMUS 0.5 MG: 0.5 CAPSULE ORAL at 09:11

## 2018-11-13 RX ADMIN — HEPARIN SODIUM 5000 UNITS: 5000 INJECTION, SOLUTION INTRAVENOUS; SUBCUTANEOUS at 03:11

## 2018-11-13 RX ADMIN — INSULIN ASPART 10 UNITS: 100 INJECTION, SOLUTION INTRAVENOUS; SUBCUTANEOUS at 01:11

## 2018-11-13 RX ADMIN — MIDODRINE HYDROCHLORIDE 15 MG: 5 TABLET ORAL at 09:11

## 2018-11-13 NOTE — SUBJECTIVE & OBJECTIVE
Scheduled Meds:   aspirin  81 mg Oral Daily    atovaquone  1,500 mg Oral Daily    docusate sodium  100 mg Oral Daily    entecavir  0.5 mg Oral Q72H    fluconazole  200 mg Oral Daily    furosemide  80 mg Oral BID    heparin (porcine)  5,000 Units Subcutaneous Q8H    insulin aspart U-100  8-10 Units Subcutaneous TIDWM    magnesium oxide  800 mg Oral BID    midodrine  15 mg Oral TID    mirtazapine  7.5 mg Oral QHS    mycophenolate  500 mg Oral BID    pantoprazole  40 mg Oral BID    polyethylene glycol  17 g Oral Daily    predniSONE  15 mg Oral Daily    Followed by    [START ON 11/17/2018] predniSONE  10 mg Oral Daily    Followed by    [START ON 11/24/2018] predniSONE  5 mg Oral Daily    Followed by    [START ON 12/1/2018] predniSONE  2.5 mg Oral Daily    tacrolimus  0.5 mg Oral BID    ursodiol  300 mg Oral BID     Continuous Infusions:  PRN Meds:bisacodyl, bisacodyl, calcium carbonate, dextrose 50%, dextrose 50%, glucagon (human recombinant), glucose, glucose, insulin aspart U-100, ondansetron, ondansetron, oxyCODONE, oxyCODONE, polyethylene glycol, promethazine (PHENERGAN) IVPB, sodium chloride 0.9%    Review of Systems   Constitutional: Positive for activity change, appetite change and fatigue. Negative for chills and fever.   HENT: Negative for congestion, facial swelling and trouble swallowing.    Eyes: Negative for pain, discharge and visual disturbance.   Respiratory: Negative for cough, chest tightness, shortness of breath and wheezing.    Cardiovascular: Positive for leg swelling. Negative for chest pain and palpitations.   Gastrointestinal: Positive for abdominal distention and abdominal pain (improved). Negative for blood in stool, constipation, diarrhea, nausea and vomiting.   Endocrine: Negative.    Genitourinary: Negative for decreased urine volume, difficulty urinating and dysuria.   Musculoskeletal: Negative for arthralgias, back pain, neck pain and neck stiffness.   Skin: Positive  for wound.   Allergic/Immunologic: Positive for immunocompromised state.   Neurological: Positive for weakness. Negative for tremors and headaches.   Psychiatric/Behavioral: Negative for confusion and sleep disturbance.     Objective:     Vital Signs (Most Recent):  Temp: 97.6 °F (36.4 °C) (11/13/18 1214)  Pulse: 88 (11/13/18 1135)  Resp: 11 (11/13/18 1135)  BP: (!) 142/85 (11/13/18 1135)  SpO2: 99 % (11/13/18 1135) Vital Signs (24h Range):  Temp:  [97.5 °F (36.4 °C)-97.8 °F (36.6 °C)] 97.6 °F (36.4 °C)  Pulse:  [68-94] 88  Resp:  [7-16] 11  SpO2:  [95 %-100 %] 99 %  BP: (102-146)/(64-87) 142/85     Weight: 70.8 kg (156 lb 1.4 oz)  Body mass index is 27.65 kg/m².    Intake/Output - Last 3 Shifts       11/11 0700 - 11/12 0659 11/12 0700 - 11/13 0659 11/13 0700 - 11/14 0659    P.O. 180 780 180    Total Intake(mL/kg) 180 (2.5) 780 (11) 180 (2.5)    Urine (mL/kg/hr)  1725 (1) 400 (1)    Other  8600     Stool  0     Total Output  62288 400    Net +180 -9545 -220           Urine Occurrence 1 x      Stool Occurrence 0 x 1 x     Emesis Occurrence 0 x            Physical Exam   Constitutional: She is oriented to person, place, and time. She appears well-developed. No distress.   Temporal and distal extremity muscle wasting   HENT:   Head: Normocephalic and atraumatic.   Eyes: EOM are normal. No scleral icterus.   Neck: Normal range of motion.   Cardiovascular: Normal rate and regular rhythm.   No murmur heard.  Pulmonary/Chest: Effort normal. No respiratory distress. She has no wheezes.   Decreased at bases   Abdominal: Bowel sounds are normal. She exhibits distension (improved from yesterday). There is tenderness. There is no guarding.   Chevron incision w/ steri strips, minimal serous leakage from R side   RLQ VALORIE drain sites c/d/i with sutures   Musculoskeletal: Normal range of motion. She exhibits edema (2+ BLE, + generalized).   Neurological: She is alert and oriented to person, place, and time.   Skin: Skin is warm. She  is not diaphoretic.   Psychiatric: She has a normal mood and affect.   Nursing note and vitals reviewed.      Laboratory:  Immunosuppressants         Stop Route Frequency     tacrolimus capsule 0.5 mg      -- Oral 2 times daily     mycophenolate capsule 500 mg      -- Oral 2 times daily        CBC:   Recent Labs   Lab 11/13/18  0500   WBC 3.71*   RBC 2.49*   HGB 7.2*   HCT 22.6*   PLT 82*   MCV 91   MCH 28.9   MCHC 31.9*     CMP:   Recent Labs   Lab 11/13/18  0500   *   CALCIUM 8.4*   ALBUMIN 3.1*   PROT 4.3*   *   K 4.4   CO2 23   CL 96   *   CREATININE 2.6*   ALKPHOS 84   ALT 16   AST 19   BILITOT 1.2*     Labs within the past 24 hours have been reviewed.    Diagnostic Results:  I have personally reviewed all pertinent imaging studies.

## 2018-11-13 NOTE — PLAN OF CARE
Problem: Occupational Therapy Goal  Goal: Occupational Therapy Goal  Goals to be met by: 11/25/18 ( revised 11/13/18)    Patient will increase functional independence with ADLs by performing:    Feeding with [place].  UE Dressing with Supervision.  LE Dressing with Supervision.  Grooming while standing at sink with Supervision.  Toileting from toilet with Supervision for hygiene and clothing management.   Toilet transfer to toilet with Supervision.      Outcome: Ongoing (interventions implemented as appropriate)  Continue with POC.   Laura corado OT  11/13/2018

## 2018-11-13 NOTE — PROGRESS NOTES
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 Post-op course c/b hypercapneic and hypoxic respiratory failure and was reintubated though quickly extubated now doing well. Liver bx (10/6) sig for Zone 3 hemorrhage and collapse, in addition to cholestasis. Transferred from ICU on POD #4. ID consulted to comment on positive diphtheroid culture from ascitic fluid (likely skin colonization) as well as ESBL Klebsiella pneumoniae in urine culture (10/4).  Pt asymptomatic and cell count from ascitic fluid unremarkable. Per ID recs, no need to treat diphtheroids or asymptomatic ESBL Kleb pneumo bacteriuria though pt has had 6 days of therapy which would cover these organisms. Mild peritransplant ascites- repeat US 10/6 with increased arterial resistive indices. Repeat US 10/9 w/ continued elevation in RIs and fluid collections. LFTs trending down nicely.      Acute gradual worsening of renal function s/p OLTx.Creatinine on arrival 0.8 and has been up trending. Nephrology consulted for post-op EVA. Cr cont to trend up but remains to have good UOP. Had stable response to lasiz diuresis.  BUN elevated requiring multiple rounds of dialysis (10/15, 10/17, 10/20, 10/31). No significant improvements in kidney function noted.   In addition, pt H/H cont to fluctuate requiring multiple blood transfusions (10/14, 10/16, 10/18, 10/19, 10/22, 10/27, 10/29).  Pt reported dark stools 10/15 but color has normalized. FOBT+. EGD 10/18 consistent with ulcerated mucosa in duodenum s/p  coagulation. Concern for H. Pylori. Started Amoxicillin/levofloxacin (10/19, complete 11/2)). Remains on Protonix 40 mg BID. Biopsy negative for infection and CMV. Will cont close monitoring H/H, transfuse as needed with goal Hb > 7.0.  Of note, Urine cx + Klebsiella pneumoniae (10/13). Received 3 day course of ciprofloxacin, dc'd 10/19.   On 10/19 pm pt c/o crushing chest pain. Troponin 0.094, likely 2/2 ischemic stress. EKG NSR.      11/5/18 - no acute events overnight. Pt reports feeling well this am. One unit of PRBCs given 11/3 with good response. Cr level noted with only minimal increases. Lasix placed on hold per nephrology recs. Will plan to hydrate today w/ albumin. EGD 11/5 unimpressive for overt bleed. Pt remains on protonix 40 BID. Denies melena. Cont to hold heparin. WBC cont to decrease. Immunosuppression managed with hydrocortisone 50 (cont to hold Cellcept, bactrim, valcyte). Prograf now restarted and transitioned IV steroids to PO prednisone on 11/3. CMV PCR noted with 129. Cont to monitor.     Interval History:   Patient received paracentesis yesterday evening with total of 8.6L removed; received 25gX2 of 25% albumin. Otherwise, UOP of 1.73L. She remained afebrile. Complains of abdominal and back pain.     Review of patient's allergies indicates:  No Known Allergies  Current Facility-Administered Medications   Medication Frequency    aspirin chewable tablet 81 mg Daily    atovaquone suspension 1,500 mg Daily    bisacodyl EC tablet 10 mg Daily PRN    bisacodyl suppository 10 mg Daily PRN    calcium carbonate 200 mg calcium (500 mg) chewable tablet 500 mg TID PRN    dextrose 50% injection 12.5 g PRN    dextrose 50% injection 25 g PRN    docusate sodium capsule 100 mg Daily    entecavir tablet 0.5 mg Q72H    fluconazole tablet 200 mg Daily    furosemide tablet 80 mg BID    glucagon (human recombinant) injection 1 mg PRN    glucose chewable tablet 16 g PRN    glucose chewable tablet 24 g  PRN    heparin (porcine) injection 5,000 Units Q8H    insulin aspart U-100 pen 0-5 Units QID (AC + HS) PRN    insulin aspart U-100 pen 8-10 Units TIDWM    magnesium oxide tablet 800 mg BID    midodrine tablet 15 mg TID    mirtazapine tablet 7.5 mg QHS    mycophenolate capsule 500 mg BID    ondansetron disintegrating tablet 8 mg Q8H PRN    ondansetron injection 4 mg Q8H PRN    oxyCODONE immediate release tablet 5 mg Q4H PRN    oxyCODONE immediate release tablet Tab 10 mg Q4H PRN    pantoprazole EC tablet 40 mg BID    polyethylene glycol packet 17 g Daily PRN    polyethylene glycol packet 17 g Daily    predniSONE tablet 15 mg Daily    Followed by    [START ON 11/17/2018] predniSONE tablet 10 mg Daily    Followed by    [START ON 11/24/2018] predniSONE tablet 5 mg Daily    Followed by    [START ON 12/1/2018] predniSONE tablet 2.5 mg Daily    promethazine (PHENERGAN) 6.25 mg in dextrose 5 % 50 mL IVPB Q6H PRN    sodium chloride 0.9% flush 3 mL PRN    tacrolimus capsule 0.5 mg BID    ursodiol capsule 300 mg BID       Objective:     Vital Signs (Most Recent):  Temp: 97.6 °F (36.4 °C) (11/13/18 1214)  Pulse: 87 (11/13/18 1300)  Resp: 14 (11/13/18 1300)  BP: (!) 142/85 (11/13/18 1135)  SpO2: 99 % (11/13/18 1300)  O2 Device (Oxygen Therapy): room air (11/13/18 1300) Vital Signs (24h Range):  Temp:  [97.5 °F (36.4 °C)-97.8 °F (36.6 °C)] 97.6 °F (36.4 °C)  Pulse:  [68-94] 87  Resp:  [7-16] 14  SpO2:  [95 %-100 %] 99 %  BP: (102-146)/(64-87) 142/85     Weight: 70.8 kg (156 lb 1.4 oz) (11/12/18 0530)  Body mass index is 27.65 kg/m².  Body surface area is 1.77 meters squared.    I/O last 3 completed shifts:  In: 960 [P.O.:960]  Out: 76125 [Urine:1725; Other:8600]    Physical Exam   Constitutional: She is oriented to person, place, and time. She appears well-developed. No distress.   Temporal and distal extremity muscle wasting   HENT:   Head: Normocephalic and atraumatic.   Eyes: EOM are normal. No scleral  icterus.   Neck: Normal range of motion.   Cardiovascular: Normal rate and regular rhythm.   No murmur heard.  Pulmonary/Chest: Effort normal. No respiratory distress. She has no wheezes.   Decreased at bases   Abdominal: Bowel sounds are normal. She exhibits distension (improved from yesterday). There is tenderness. There is no guarding.   Chevron incision w/ steri strips, minimal serous leakage from R side   RLQ VALORIE drain sites c/d/i with sutures   Musculoskeletal: Normal range of motion. She exhibits edema (2+ BLE, + generalized).   Neurological: She is alert and oriented to person, place, and time.   Skin: Skin is warm. She is not diaphoretic.   Psychiatric: She has a normal mood and affect.   Nursing note and vitals reviewed.      Significant Labs:  CBC:   Recent Labs   Lab 11/13/18  0500 11/13/18  1305   WBC 3.71*  --    RBC 2.49*  --    HGB 7.2* 8.7*   HCT 22.6* 27.5*   PLT 82*  --    MCV 91  --    MCH 28.9  --    MCHC 31.9*  --      CMP:   Recent Labs   Lab 11/13/18  0500   *   CALCIUM 8.4*   ALBUMIN 3.1*   PROT 4.3*   *   K 4.4   CO2 23   CL 96   *   CREATININE 2.6*   ALKPHOS 84   ALT 16   AST 19   BILITOT 1.2*     All labs within the past 24 hours have been reviewed.       Assessment/Plan:      Post LT - Acute renal failure    Post-LT EVA is multifactorial in origin and has been related to the severity of liver disease, toxicity of immunosuppressive therapy and hepatic ischaemia reperfusion injury might be a driving force in the aetiology of post-LT EVA.     Plan:  - No indication for CRRT today; can remove HD line; patient no long oliguric.   - Patient worsening azotemia likely secondary to her H/H dropping from GI bleed last week.   - Stable azotemia; creatinine is stable at 2.6 (2.5 yesterday).   - No uremic symptoms or signs.   - Recommend continuing furosemide at 80 mg PO BID; still with significant pitting LE edema and evidence of fluid overload- responded well with higher dose  yesterday with total UOP of 1.73L         Thank you for your consult. I will follow-up with patient. Please contact us if you have any additional questions.      Edward Dye MD  PGY-3 Internal Medicine  [phone]    Nephrology  [facility]    ATTENDING PHYSICIAN ATTESTATION  I have personally interviewed and examined the patient. I thoroughly reviewed the demographic, clinical, laboratorial and imaging information available in medical records. I agree with the assessment and recommendations provided by the subspecialty resident. Dr. Dye was under my supervision.

## 2018-11-13 NOTE — ASSESSMENT & PLAN NOTE
- CRRT 10/14, 10/17, 10/20, 10/31.  - CXR 10/30: abundant fluid in b/l pleural space.  - Restarted Lasix 40 BID x 2 doses (11/2).  - Lasix held and then restarted 11/9 at 40 mg BID per nephrology recs.  - Pt not showing improvement in fluid status.   - Paracentesis 11/12: 8.6L removed  - LE edema improving   - Continue 80 mg Lasix BID

## 2018-11-13 NOTE — ASSESSMENT & PLAN NOTE
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     Plan:  - No indication for CRRT today; can remove HD line; patient no long oliguric.   - Patient worsening azotemia likely secondary to her H/H dropping from GI bleed last week.   - Stable azotemia; creatinine is stable at 2.6 (2.5 yesterday).   - No uremic symptoms or signs.   - Recommend continuing furosemide at 80 mg PO BID; still with significant pitting LE edema and evidence of fluid overload- responded well with higher dose yesterday with total UOP of 1.73L

## 2018-11-13 NOTE — ASSESSMENT & PLAN NOTE
"- Paracentesis 10/1, 5L removed, no fluid sent for analysis.  - Pt remains volume overloaded.   - Liver US 11/6 with severe ascites.   - Paracentesis 11/7 with 8.9 L removed. Wbc 39, segs 16%. Gram stain - no organisms seen and no WBC. Aerobic culture no growth. Anaerobic and fungal cultures pending.   - Paracentesis 11/13: 8.6 L removed. Fluid had "turbid" appearance, but only 34 wbc, 25 segs, 33 lymphs, 42 monocytes/macrophages. Protein and triglyceride levels pending. Gram stain - no organisms seen and aerobic culture no growth. Anaerobic and fungal cultures pending. Tolerated well.    "

## 2018-11-13 NOTE — PT/OT/SLP PROGRESS
Physical Therapy      Patient Name:  Scarlett Reddy   MRN:  [id number]    Patient not seen today secondary to (Pt working with OT). PT unable to return for later attempt. Will follow-up at next scheduled session as able.    Radha Nguyen, PT, DPT   11/13/2018  [phone]

## 2018-11-13 NOTE — ASSESSMENT & PLAN NOTE
"- H&H cont to fluctuate.   - FOBT +.  - EGD 10/18 - showed diffuse bleeding with ulcerated duodenal mucosa.  - EGD 11/5 - improvement seen, no active bleeding.  - 1 unit PRBCs transfused 10/27 and 11/3.   - H&H with good response.   - Resume heparin sq injections 11/6.   - See "anemia requiring transfusions."  - Continue to monitor.         "

## 2018-11-13 NOTE — PROGRESS NOTES
[facility]  Liver Transplant  Progress Note    Patient Name: Scarlett Reddy  MRN: [id number]  Admission Date: 10/1/2018  Hospital Length of Stay: 42 days  Code Status: Full Code  Primary Care Provider: Primary Doctor No  Post-Operative Day: 39    ORGAN:   LIVER  Disease Etiology: Cirrhosis: Type B and D  Donor Type:    - Brain Death  CDC High Risk:   No  Donor CMV Status:   Donor CMV Status: Positive  Donor HBcAB:   Negative  Donor HCV Status:   Negative  Donor HBV SETH: Negative  Donor HCV SETH: Negative  Whole or Partial: Whole Liver  Biliary Anastomosis: End to End  Arterial Anatomy: Replaced Left Hepatic and Right Hepatic  Subjective:     History of Present Illness:  Ms. Reddy is a 69 y/o female with PMH of ESLD secondary Hep B and D.  Listed for liver transplant with MELD 23.  Paracentesis 10/1 with 5L removed, no fluid sent for analysis.  Morning labs significant for hyponatremia, Na 119.  Pt admitted to LTS for sodium monitoring.      Hospital Course: 69 y/o F h/o HBV/delta cirrhosis s/p DBDLT 10/5/18 (CMV D+/R+, steroid induction, MMF/tacro/pred maintenance) with take back on 10/6 2/2 hyperbilirubinema and bilious VALORIE drainage, no leak identified. Post-op course c/b hypercapneic and hypoxic respiratory failure and was reintubated though quickly extubated now doing well. Liver bx (10/6) sig for Zone 3 hemorrhage and collapse, in addition to cholestasis. Transferred from ICU on POD #4. ID consulted to comment on positive diphtheroid culture from ascitic fluid (likely skin colonization) as well as ESBL Klebsiella pneumoniae in urine culture (10/4).  Pt asymptomatic and cell count from ascitic fluid unremarkable. Per ID recs, no need to treat diphtheroids or asymptomatic ESBL Kleb pneumo bacteriuria though pt has had 6 days of therapy which would cover these organisms. Mild peritransplant ascites- repeat US 10/6 with increased arterial resistive indices. Repeat US 10/9 w/ continued  "elevation in RIs and fluid collections. LFTs trending down nicely.     Acute gradual worsening of renal function s/p OLTx.Creatinine on arrival 0.8 and has been up trending. Nephrology consulted for post-op EVA. Cr cont to trend up but remains to have good UOP. Had stable response to lasiz diuresis.  BUN elevated requiring multiple rounds of dialysis (10/15, 10/17, 10/20, 10/31). No significant improvements in kidney function noted.   In addition, pt H/H cont to fluctuate requiring multiple blood transfusions (10/14, 10/16, 10/18, 10/19, 10/22, 10/27, 10/29).  Pt reported dark stools 10/15 but color has normalized. FOBT+. EGD 10/18 consistent with ulcerated mucosa in duodenum s/p coagulation. Concern for H. Pylori. Started Amoxicillin/levofloxacin (10/19, complete 11/2)). Remains on Protonix 40 mg BID. Biopsy negative for infection and CMV. Will cont close monitoring H/H, transfuse as needed with goal Hb > 7.0.  Of note, Urine cx + Klebsiella pneumoniae (10/13). Received 3 day course of ciprofloxacin, dc'd 10/19.   On 10/19 pm pt c/o crushing chest pain. Troponin 0.094, likely 2/2 ischemic stress. EKG NSR.   Liver US 11/6 showed no arterial flow. Repeat Liver US 11/8 showed arterial flow with increased RIs. Will Monitor. No plan for CTA 2/2 poor kidney function.  Paracentesis 11/7 with 8.9 L removed. Wbc 39, segs 16%. Gram stain no organisms seen + no WBC. Pt feels better since having fluid removed but she continues to have worsening abdominal distention & LE edema.     Interval history: No acute events overnight. 8.6 L removed with paracentesis yesterday. Fluid had "turbid" appearance, but only 34 wbc, 25 segs, 33 lymphs, 42 monocytes/macrophages. Protein and triglyceride levels pending. Gram stain - no organisms seen and aerobic culture no growth. Anaerobic and fungal cultures pending. Pt tolerated paracentesis well, and she states she feels much better today. Pt received 120 mg of Lasix total yesterday, and her " LE edema has significantly improved. Will continue with 80 mg Lasix BID. Renal fx not improving, but relatively stable (Cr. 2.6, ). UOP 1.4 L yesterday. Spoke with nephrology who stated they have no plans to dialyze in the near future. Removed central line. Poor PO intake, but improving. Wbc decreased again today to 3.71 from 5.97. Cellcept restarted at 500 mg bid on 11/6. CMV  on 11/1. CMV  11/8. Continue to hold Valcyte. H/H dropped from 8.5/26.5 to 7.2/22.6. Ordered type+screen, iron studies, and hemolytic anemia workup. Repeat H/H 1400 pending. Will review prior to transfusion decision.  Liver US 11/6 showed no arterial flow, likely due to poor visualization 2/2 ascites. Post paracentesis repeat liver US 11/8 showed arterial flow with increased RIs.  No plan for CTA 2/2 poor kidney function. Will Monitor with plans for repeat liver u/s Thursday, 11/15.    Scheduled Meds:   aspirin  81 mg Oral Daily    atovaquone  1,500 mg Oral Daily    docusate sodium  100 mg Oral Daily    entecavir  0.5 mg Oral Q72H    fluconazole  200 mg Oral Daily    furosemide  80 mg Oral BID    heparin (porcine)  5,000 Units Subcutaneous Q8H    insulin aspart U-100  8-10 Units Subcutaneous TIDWM    magnesium oxide  800 mg Oral BID    midodrine  15 mg Oral TID    mirtazapine  7.5 mg Oral QHS    mycophenolate  500 mg Oral BID    pantoprazole  40 mg Oral BID    polyethylene glycol  17 g Oral Daily    predniSONE  15 mg Oral Daily    Followed by    [START ON 11/17/2018] predniSONE  10 mg Oral Daily    Followed by    [START ON 11/24/2018] predniSONE  5 mg Oral Daily    Followed by    [START ON 12/1/2018] predniSONE  2.5 mg Oral Daily    tacrolimus  0.5 mg Oral BID    ursodiol  300 mg Oral BID     Continuous Infusions:  PRN Meds:bisacodyl, bisacodyl, calcium carbonate, dextrose 50%, dextrose 50%, glucagon (human recombinant), glucose, glucose, insulin aspart U-100, ondansetron, ondansetron, oxyCODONE,  oxyCODONE, polyethylene glycol, promethazine (PHENERGAN) IVPB, sodium chloride 0.9%    Review of Systems   Constitutional: Positive for activity change, appetite change and fatigue. Negative for chills and fever.   HENT: Negative for congestion, facial swelling and trouble swallowing.    Eyes: Negative for pain, discharge and visual disturbance.   Respiratory: Negative for cough, chest tightness, shortness of breath and wheezing.    Cardiovascular: Positive for leg swelling. Negative for chest pain and palpitations.   Gastrointestinal: Positive for abdominal distention and abdominal pain (improved). Negative for blood in stool, constipation, diarrhea, nausea and vomiting.   Endocrine: Negative.    Genitourinary: Negative for decreased urine volume, difficulty urinating and dysuria.   Musculoskeletal: Negative for arthralgias, back pain, neck pain and neck stiffness.   Skin: Positive for wound.   Allergic/Immunologic: Positive for immunocompromised state.   Neurological: Positive for weakness. Negative for tremors and headaches.   Psychiatric/Behavioral: Negative for confusion and sleep disturbance.     Objective:     Vital Signs (Most Recent):  Temp: 97.6 °F (36.4 °C) (11/13/18 1214)  Pulse: 88 (11/13/18 1135)  Resp: 11 (11/13/18 1135)  BP: (!) 142/85 (11/13/18 1135)  SpO2: 99 % (11/13/18 1135) Vital Signs (24h Range):  Temp:  [97.5 °F (36.4 °C)-97.8 °F (36.6 °C)] 97.6 °F (36.4 °C)  Pulse:  [68-94] 88  Resp:  [7-16] 11  SpO2:  [95 %-100 %] 99 %  BP: (102-146)/(64-87) 142/85     Weight: 70.8 kg (156 lb 1.4 oz)  Body mass index is 27.65 kg/m².    Intake/Output - Last 3 Shifts       11/11 0700 - 11/12 0659 11/12 0700 - 11/13 0659 11/13 0700 - 11/14 0659    P.O. 180 780 180    Total Intake(mL/kg) 180 (2.5) 780 (11) 180 (2.5)    Urine (mL/kg/hr)  1725 (1) 400 (1)    Other  8600     Stool  0     Total Output  02430 400    Net +180 -3108 -220           Urine Occurrence 1 x      Stool Occurrence 0 x 1 x     Emesis  Occurrence 0 x            Physical Exam   Constitutional: She is oriented to person, place, and time. She appears well-developed. No distress.   Temporal and distal extremity muscle wasting   HENT:   Head: Normocephalic and atraumatic.   Eyes: EOM are normal. No scleral icterus.   Neck: Normal range of motion.   Cardiovascular: Normal rate and regular rhythm.   No murmur heard.  Pulmonary/Chest: Effort normal. No respiratory distress. She has no wheezes.   Decreased at bases   Abdominal: Bowel sounds are normal. She exhibits distension (improved from yesterday). There is tenderness. There is no guarding.   Chevron incision w/ steri strips, minimal serous leakage from R side   RLQ VALORIE drain sites c/d/i with sutures   Musculoskeletal: Normal range of motion. She exhibits edema (2+ BLE, + generalized).   Neurological: She is alert and oriented to person, place, and time.   Skin: Skin is warm. She is not diaphoretic.   Psychiatric: She has a normal mood and affect.   Nursing note and vitals reviewed.      Laboratory:  Immunosuppressants         Stop Route Frequency     tacrolimus capsule 0.5 mg      -- Oral 2 times daily     mycophenolate capsule 500 mg      -- Oral 2 times daily        CBC:   Recent Labs   Lab 11/13/18  0500   WBC 3.71*   RBC 2.49*   HGB 7.2*   HCT 22.6*   PLT 82*   MCV 91   MCH 28.9   MCHC 31.9*     CMP:   Recent Labs   Lab 11/13/18  0500   *   CALCIUM 8.4*   ALBUMIN 3.1*   PROT 4.3*   *   K 4.4   CO2 23   CL 96   *   CREATININE 2.6*   ALKPHOS 84   ALT 16   AST 19   BILITOT 1.2*     Labs within the past 24 hours have been reviewed.    Diagnostic Results:  I have personally reviewed all pertinent imaging studies.    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   - POD 1 US: increased arterial resistive indices,  "suggestive of edema vs acute rejection vs congestion.  - Repeat US 10/9 with continued elevation of RIs.  - LFTs stable.  - T bili trending down.   - Repeat liver US (10/11): elevated RIs.  - Liver US 11/6 to assess for ascites. No arterial flow within the liver allograft concerning for interval thrombosis/occlusion of the arterial system. Severe ascites seen.  - Liver US 11/8: arterial flow seen w/ elevated RIs   - LFTs remain stable. No CTA at this time due to EVA and normal liver function.   - Plan for repeat liver US Thursday, 11/15.        Other drug-induced pancytopenia    - Decreased plt, wbc, h/h  - See "leukopenia"       Thrombocytopenia, unspecified    - Improving.  - Monitor.       Alteration in skin integrity    Monitor.       Leukopenia    - Stopped Cellcept, Bactrim, and Valcyte 10/31 2/2 leukopenia  - Started Atovaquone 11/6.   - CMV PCR noted to be positive at 129. Will hold off on treatment with valcyte at this time.  - WBC decreased to 3.7 from 5.97 11/13  - Continue to monitor.       Azotemia    - CRRT 10/31 for uremic toxin clearance.           UTI (urinary tract infection)    - Urine Cx 10/15 w/ Klebsiella pneumonia.  - Started Ciprofloxacin (10/17-10/19).   - Started on amoxicillin/levo for H. Pylori (completed abx 11/2).  - Pt asymptomatic at this time.       Acute blood loss anemia    - H&H cont to fluctuate.   - FOBT +.  - EGD 10/18 - showed diffuse bleeding with ulcerated duodenal mucosa.  - EGD 11/5 - improvement seen, no active bleeding.  - 1 unit PRBCs transfused 10/27 and 11/3.   - H&H with good response.   - Resume heparin sq injections 11/6.   - See "anemia requiring transfusions."  - Continue to monitor.             Post LT - Acute renal failure    - Creatinine on arrival 0.8 and trended up.  - Nephrology consulted.  - Renal US (10/11): no hydronephrosis.  - Echo 10/12: diastolic dysfunction, likely 2/2 to ARF.  - CRRT 10/14; SLED 10/17, 10/20. --> no improvements noted in kidney " "fxn.  - 24 hr urine creatine collection: CrCl of 7.   - CRRT 10/31 with 1L removed.   - See "hypervolemia associated with renal insufficiency"     Hypervolemia associated with renal insufficiency    - CRRT 10/14, 10/17, 10/20, 10/31.  - CXR 10/30: abundant fluid in b/l pleural space.  - Restarted Lasix 40 BID x 2 doses (11/2).  - Lasix held and then restarted 11/9 at 40 mg BID per nephrology recs.  - Pt not showing improvement in fluid status.   - Paracentesis 11/12: 8.6L removed  - LE edema improving   - Continue 80 mg Lasix BID         EVA (acute kidney injury)    - 11/11: Renal fx not improving. Cr 2.6, .  - No plans for RRT in near future, per nephrology  - Trialysis line removed 11/13  - Nephrology involved. Appreciate recs.  - Monitor.     Metabolic acidosis    - CRRT 10/14.  - SLED 10/17, 10/20, 10/31.         At risk for opportunistic infections    - Valcyte for CMV prophylaxis--> holding (10/31) for leukopenia. Pt will need weekly CMV PCR while discontinued.  - CMV PCR noted to be positive at 129. .  - CMV PCR 11/8: 105  - Next CMV PCR due 11/15.  - Bactrim for PCP prophylaxis (MW).--> holding (10/31) for leukopenia.   - Start Atovaquone 11/6.  - Fluconazole for fungal prophylaxis.         Long-term use of immunosuppressant medication    - Prograf: stopped 10/29. Resumed 11/2.  --> Will monitor for signs of toxic side effects, check daily troughs, and change meds accordingly.  - Prednisone: stopped 10/29. Started on Hydrocortisone. pred taper resumed.  - Cellcept: stopped 10/31 for leukopenia. Restarted Cellcept 500 mg bid, 11/6.         Prophylactic immunotherapy    - See long term use immunosuppresion.       Ascites    - Paracentesis 10/1, 5L removed, no fluid sent for analysis.  - Pt remains volume overloaded.   - Liver US 11/6 with severe ascites.   - Paracentesis 11/7 with 8.9 L removed. Wbc 39, segs 16%. Gram stain - no organisms seen and no WBC. Aerobic culture no growth. Anaerobic and fungal " "cultures pending.   - Paracentesis 11/13: 8.6 L removed. Fluid had "turbid" appearance, but only 34 wbc, 25 segs, 33 lymphs, 42 monocytes/macrophages. Protein and triglyceride levels pending. Gram stain - no organisms seen and aerobic culture no growth. Anaerobic and fungal cultures pending. Tolerated well.       Weakness    - See physical deconditioning.       Physical deconditioning    - PT/OT consulted.  - Recommend home health PT and rolling walker at discharge (orders placed 11/6).       Hyponatremia    - Na 119 on admit.  - Na was improving post transplant w HD.  - Na stable at this time.              Hypotension    - continue Midodrine 15 mg TID. Monitor.       Anemia requiring transfusions    - Fluctuating H&H.    - Transfusions: 10/14; 10/16; 10/18; 10/19; 10/22; 10/27; 10/29, 10/31  - FOBT +  - LDH elevated, Hapto < 10.  - EGD 10/18: ulcerated duodenal mucosa.   - Consulted GI about this ongoing issue and further need of another EGD.  - EGD 11/5 unremarkable.   - H/H decreased again 11/13: 7.2/22.6 from 8.5/26.5  - Repeat H/H 1400 pending  - Iron studies 11/13:  95, TIBC 107, sat iron 89, transferrin 72, ferritin pending  - Hemolytic anemia workup 11/13: Haptoglobin 132, retic + LDH pending  - Will continue to monitor closely.           Severe malnutrition    - Dietary consulted.   - Temporal and distal extremity muscle wasting and edema.  - Encourage supplements and to increase nutritional intake.  - Per nephrology, pt to be on low protein diet.   - Prealbumin reviewed.      Type 2 diabetes mellitus with diabetic polyneuropathy, with long-term current use of insulin    - Continue home regimen.  - Endocrine consulted.  - Pt off insulin drip at this time.  Apprec endo recs.        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  - Tx w/ Entecavir- dose changed to q72 hours given renal function.             VTE Risk Mitigation (From admission, onward) "        Ordered     heparin (porcine) injection 5,000 Units  Every 8 hours      11/06/18 1021     IP VTE HIGH RISK PATIENT  Once      11/05/18 1145     Place sequential compression device  Until discontinued      10/05/18 0709          The patients clinical status was discussed at multidisplinary rounds, involving transplant surgery, transplant medicine, pharmacy, nursing, nutrition, and social work    Discharge Planning:  No Patient Care Coordination Note on file.      Elizabeth Dean PA-C  Liver Transplant  [facility]

## 2018-11-13 NOTE — ASSESSMENT & PLAN NOTE
- Valcyte for CMV prophylaxis--> holding (10/31) for leukopenia. Pt will need weekly CMV PCR while discontinued.  - CMV PCR noted to be positive at 129. .  - CMV PCR 11/8: 105  - Next CMV PCR due 11/15.  - Bactrim for PCP prophylaxis ([facility]).--> holding (10/31) for leukopenia.   - Start Atovaquone 11/6.  - Fluconazole for fungal prophylaxis.

## 2018-11-13 NOTE — ASSESSMENT & PLAN NOTE
- Fluctuating H&H.    - Transfusions: 10/14; 10/16; 10/18; 10/19; 10/22; 10/27; 10/29, 10/31  - FOBT +  - LDH elevated, Hapto < 10.  - EGD 10/18: ulcerated duodenal mucosa.   - Consulted GI about this ongoing issue and further need of another EGD.  - EGD 11/5 unremarkable.   - H/H decreased again 11/13: 7.2/22.6 from 8.5/26.5  - Repeat H/H 1400 pending  - Iron studies 11/13:  95, TIBC 107, sat iron 89, transferrin 72, ferritin pending  - Hemolytic anemia workup 11/13: Haptoglobin 132, retic + LDH pending  - Will continue to monitor closely.

## 2018-11-13 NOTE — ASSESSMENT & PLAN NOTE
- Stopped Cellcept, Bactrim, and Valcyte 10/31 2/2 leukopenia  - Started Atovaquone 11/6.   - CMV PCR noted to be positive at 129. Will hold off on treatment with valcyte at this time.  - WBC decreased to 3.7 from 5.97 11/13  - Continue to monitor.

## 2018-11-13 NOTE — ASSESSMENT & PLAN NOTE
- 11/11: Renal fx not improving. Cr 2.6, .  - No plans for RRT in near future, per nephrology  - Trialysis line removed 11/13  - Nephrology involved. Appreciate recs.  - Monitor.

## 2018-11-13 NOTE — PT/OT/SLP PROGRESS
Occupational Therapy   Treatment    Name: Scarlett Reddy  MRN: [id number]  Admitting Diagnosis:  Liver transplanted  8 Days Post-Op    Recommendations:     Discharge Recommendations: home health OT  Discharge Equipment Recommendations:  walker, rolling  Barriers to discharge:  None    Subjective     Communicated with: RN prior to session. Caregiver present throughout.   Pain/Comfort:  · Pain Rating 1: 0/10  · Pain Rating Post-Intervention 1: 0/10    Patients cultural, spiritual, Faith conflicts given the current situation: none reported    Objective:     Patient found with: telemetry, pulse ox (continuous)    General Precautions: Standard, fall, contact   Orthopedic Precautions:N/A   Braces: N/A     Occupational Performance:    Bed Mobility:    · NT, pt found seated UIC eating lunch independently.     Functional Mobility/Transfers:  · Patient completed Sit <> Stand Transfer with moderate assistance  with  rolling walker   · Functional Mobility: Pt completed functional mobility in hallway ( functional house hold distance) using RW and CGA. She tolerated well with no LOB, SOB, or seated rest break. ( Chair follow for safety)    Activities of Daily Living:  · Upper Body Dressing: moderate assistance to casper personal robe while seated UIC    Patient left up in chair with all lines intact, call button in reach and RN notified    AMPAC 6 Click:  AMPA Total Score: 17    Treatment & Education:  -Pt edu on OT role/POC  -Importance of OOB activity with staff assistance ( UIC throughout the day/ up for every meal)  -Safety during functional t/f and mobility ( Rw management and hand placement)   -White board updated  - Multiple self care tasks completed-- assistance level noted above  - All questions/concerns answered within OT scope of practice  - Pt completed BUE/BLE AROM exercises while seated UIC including: 1x15 reps in shoulder flexion, elbow flexion/extension, chest press, and scapular squeezes; hip flexion, knee  flexion/extension, and ankle dorsiflexion. Pt tolerated well with min rest breaks between each exercise.   Education:    Assessment:     Scarlett Reddy is a 68 y.o. female with a medical diagnosis of Liver transplanted.  She presents with alert and motivated to participate with therapy.  Performance deficits affecting function are weakness, impaired endurance, gait instability, impaired balance, impaired functional mobilty, impaired self care skills, impaired cardiopulmonary response to activity, pain.  Pt would benefit from HHOT following d/c to continue to progress towards goals and ensure safe transition to home environment.     Rehab Prognosis:  Good; patient would benefit from acute skilled OT services to address these deficits and reach maximum level of function.       Plan:     Patient to be seen 4 x/week to address the above listed problems via self-care/home management, therapeutic activities, therapeutic exercises, neuromuscular re-education  · Plan of Care Expires: 12/07/18  · Plan of Care Reviewed with: patient, spouse    This Plan of care has been discussed with the patient who was involved in its development and understands and is in agreement with the identified goals and treatment plan    GOALS:   Multidisciplinary Problems     Occupational Therapy Goals        Problem: Occupational Therapy Goal    Goal Priority Disciplines Outcome Interventions   Occupational Therapy Goal     OT, PT/OT Ongoing (interventions implemented as appropriate)    Description:  Goals to be met by: 11/25/18 ( revised 11/13/18)    Patient will increase functional independence with ADLs by performing:    Feeding with [place].  UE Dressing with Supervision.  LE Dressing with Supervision.  Grooming while standing at sink with Supervision.  Toileting from toilet with Supervision for hygiene and clothing management.   Toilet transfer to toilet with Supervision.                        Time Tracking:     OT Date of Treatment:  11/13/18  OT Start Time: 1355  OT Stop Time: 1418  OT Total Time (min): 23 min    Billable Minutes:Therapeutic Exercise 23    Laura corado OT  11/13/2018

## 2018-11-13 NOTE — NURSING
David notified of pre-dinner glucose 103 and that patient had only eaten 1 slice bread with peanut tbutter.  did not want full 8 units insulin given, order for 4 units obtained from Dr. Rodriguez.

## 2018-11-13 NOTE — SUBJECTIVE & OBJECTIVE
Interval History:   Patient received paracentesis yesterday evening with total of 8.6L removed; received 25gX2 of 25% albumin. Otherwise, UOP of 1.73L. She remained afebrile. Complains of abdominal and back pain.     Review of patient's allergies indicates:  No Known Allergies  Current Facility-Administered Medications   Medication Frequency    aspirin chewable tablet 81 mg Daily    atovaquone suspension 1,500 mg Daily    bisacodyl EC tablet 10 mg Daily PRN    bisacodyl suppository 10 mg Daily PRN    calcium carbonate 200 mg calcium (500 mg) chewable tablet 500 mg TID PRN    dextrose 50% injection 12.5 g PRN    dextrose 50% injection 25 g PRN    docusate sodium capsule 100 mg Daily    entecavir tablet 0.5 mg Q72H    fluconazole tablet 200 mg Daily    furosemide tablet 80 mg BID    glucagon (human recombinant) injection 1 mg PRN    glucose chewable tablet 16 g PRN    glucose chewable tablet 24 g PRN    heparin (porcine) injection 5,000 Units Q8H    insulin aspart U-100 pen 0-5 Units QID (AC + HS) PRN    insulin aspart U-100 pen 8-10 Units TIDWM    magnesium oxide tablet 800 mg BID    midodrine tablet 15 mg TID    mirtazapine tablet 7.5 mg QHS    mycophenolate capsule 500 mg BID    ondansetron disintegrating tablet 8 mg Q8H PRN    ondansetron injection 4 mg Q8H PRN    oxyCODONE immediate release tablet 5 mg Q4H PRN    oxyCODONE immediate release tablet Tab 10 mg Q4H PRN    pantoprazole EC tablet 40 mg BID    polyethylene glycol packet 17 g Daily PRN    polyethylene glycol packet 17 g Daily    predniSONE tablet 15 mg Daily    Followed by    [START ON 11/17/2018] predniSONE tablet 10 mg Daily    Followed by    [START ON 11/24/2018] predniSONE tablet 5 mg Daily    Followed by    [START ON 12/1/2018] predniSONE tablet 2.5 mg Daily    promethazine (PHENERGAN) 6.25 mg in dextrose 5 % 50 mL IVPB Q6H PRN    sodium chloride 0.9% flush 3 mL PRN    tacrolimus capsule 0.5 mg BID    ursodiol  capsule 300 mg BID       Objective:     Vital Signs (Most Recent):  Temp: 97.6 °F (36.4 °C) (11/13/18 1214)  Pulse: 87 (11/13/18 1300)  Resp: 14 (11/13/18 1300)  BP: (!) 142/85 (11/13/18 1135)  SpO2: 99 % (11/13/18 1300)  O2 Device (Oxygen Therapy): room air (11/13/18 1300) Vital Signs (24h Range):  Temp:  [97.5 °F (36.4 °C)-97.8 °F (36.6 °C)] 97.6 °F (36.4 °C)  Pulse:  [68-94] 87  Resp:  [7-16] 14  SpO2:  [95 %-100 %] 99 %  BP: (102-146)/(64-87) 142/85     Weight: 70.8 kg (156 lb 1.4 oz) (11/12/18 0530)  Body mass index is 27.65 kg/m².  Body surface area is 1.77 meters squared.    I/O last 3 completed shifts:  In: 960 [P.O.:960]  Out: 49329 [Urine:1725; Other:8600]    Physical Exam   Constitutional: She is oriented to person, place, and time. She appears well-developed. No distress.   Temporal and distal extremity muscle wasting   HENT:   Head: Normocephalic and atraumatic.   Eyes: EOM are normal. No scleral icterus.   Neck: Normal range of motion.   Cardiovascular: Normal rate and regular rhythm.   No murmur heard.  Pulmonary/Chest: Effort normal. No respiratory distress. She has no wheezes.   Decreased at bases   Abdominal: Bowel sounds are normal. She exhibits distension (improved from yesterday). There is tenderness. There is no guarding.   Chevron incision w/ steri strips, minimal serous leakage from R side   RLQ VALORIE drain sites c/d/i with sutures   Musculoskeletal: Normal range of motion. She exhibits edema (2+ BLE, + generalized).   Neurological: She is alert and oriented to person, place, and time.   Skin: Skin is warm. She is not diaphoretic.   Psychiatric: She has a normal mood and affect.   Nursing note and vitals reviewed.      Significant Labs:  CBC:   Recent Labs   Lab 11/13/18  0500 11/13/18  1305   WBC 3.71*  --    RBC 2.49*  --    HGB 7.2* 8.7*   HCT 22.6* 27.5*   PLT 82*  --    MCV 91  --    MCH 28.9  --    MCHC 31.9*  --      CMP:   Recent Labs   Lab 11/13/18  0500   *   CALCIUM 8.4*    ALBUMIN 3.1*   PROT 4.3*   *   K 4.4   CO2 23   CL 96   *   CREATININE 2.6*   ALKPHOS 84   ALT 16   AST 19   BILITOT 1.2*     All labs within the past 24 hours have been reviewed.

## 2018-11-14 LAB
ALBUMIN SERPL BCP-MCNC: 2.9 G/DL
ALP SERPL-CCNC: 104 U/L
ALT SERPL W/O P-5'-P-CCNC: 20 U/L
ANION GAP SERPL CALC-SCNC: 14 MMOL/L
AST SERPL-CCNC: 22 U/L
BASOPHILS # BLD AUTO: 0.01 K/UL
BASOPHILS NFR BLD: 0.2 %
BILIRUB SERPL-MCNC: 1.2 MG/DL
BUN SERPL-MCNC: 104 MG/DL
CALCIUM SERPL-MCNC: 8.4 MG/DL
CHLORIDE SERPL-SCNC: 97 MMOL/L
CO2 SERPL-SCNC: 21 MMOL/L
CREAT SERPL-MCNC: 2.7 MG/DL
DIFFERENTIAL METHOD: ABNORMAL
EOSINOPHIL # BLD AUTO: 0 K/UL
EOSINOPHIL NFR BLD: 0.2 %
ERYTHROCYTE [DISTWIDTH] IN BLOOD BY AUTOMATED COUNT: 17.3 %
EST. GFR  (AFRICAN AMERICAN): 20 ML/MIN/1.73 M^2
EST. GFR  (NON AFRICAN AMERICAN): 17.3 ML/MIN/1.73 M^2
GLUCOSE SERPL-MCNC: 188 MG/DL
HCT VFR BLD AUTO: 26.5 %
HGB BLD-MCNC: 8.3 G/DL
IMM GRANULOCYTES # BLD AUTO: 0.1 K/UL
IMM GRANULOCYTES NFR BLD AUTO: 2.3 %
INR PPP: 1
LYMPHOCYTES # BLD AUTO: 0.2 K/UL
LYMPHOCYTES NFR BLD: 4.5 %
MAGNESIUM SERPL-MCNC: 2.1 MG/DL
MCH RBC QN AUTO: 28.7 PG
MCHC RBC AUTO-ENTMCNC: 31.3 G/DL
MCV RBC AUTO: 92 FL
MONOCYTES # BLD AUTO: 0.4 K/UL
MONOCYTES NFR BLD: 9 %
NEUTROPHILS # BLD AUTO: 3.7 K/UL
NEUTROPHILS NFR BLD: 83.8 %
NRBC BLD-RTO: 0 /100 WBC
PHOSPHATE SERPL-MCNC: 3.4 MG/DL
PLATELET # BLD AUTO: 97 K/UL
PMV BLD AUTO: 11 FL
POCT GLUCOSE: 147 MG/DL (ref 70–110)
POCT GLUCOSE: 190 MG/DL (ref 70–110)
POCT GLUCOSE: 248 MG/DL (ref 70–110)
POCT GLUCOSE: 264 MG/DL (ref 70–110)
POTASSIUM SERPL-SCNC: 4.7 MMOL/L
PROT SERPL-MCNC: 4.4 G/DL
PROTHROMBIN TIME: 10.3 SEC
RBC # BLD AUTO: 2.89 M/UL
SODIUM SERPL-SCNC: 132 MMOL/L
TACROLIMUS BLD-MCNC: 6.1 NG/ML
WBC # BLD AUTO: 4.44 K/UL

## 2018-11-14 PROCEDURE — 94761 N-INVAS EAR/PLS OXIMETRY MLT: CPT

## 2018-11-14 PROCEDURE — 25000003 PHARM REV CODE 250: Performed by: NURSE PRACTITIONER

## 2018-11-14 PROCEDURE — 85025 COMPLETE CBC W/AUTO DIFF WBC: CPT

## 2018-11-14 PROCEDURE — 85610 PROTHROMBIN TIME: CPT

## 2018-11-14 PROCEDURE — 94664 DEMO&/EVAL PT USE INHALER: CPT

## 2018-11-14 PROCEDURE — 25000003 PHARM REV CODE 250: Performed by: TRANSPLANT SURGERY

## 2018-11-14 PROCEDURE — 25000003 PHARM REV CODE 250: Performed by: PHYSICIAN ASSISTANT

## 2018-11-14 PROCEDURE — 99900035 HC TECH TIME PER 15 MIN (STAT)

## 2018-11-14 PROCEDURE — 25000003 PHARM REV CODE 250: Performed by: STUDENT IN AN ORGANIZED HEALTH CARE EDUCATION/TRAINING PROGRAM

## 2018-11-14 PROCEDURE — 84100 ASSAY OF PHOSPHORUS: CPT

## 2018-11-14 PROCEDURE — 99233 SBSQ HOSP IP/OBS HIGH 50: CPT | Mod: ,,, | Performed by: INTERNAL MEDICINE

## 2018-11-14 PROCEDURE — 63600175 PHARM REV CODE 636 W HCPCS: Performed by: NURSE PRACTITIONER

## 2018-11-14 PROCEDURE — 80053 COMPREHEN METABOLIC PANEL: CPT

## 2018-11-14 PROCEDURE — 80197 ASSAY OF TACROLIMUS: CPT

## 2018-11-14 PROCEDURE — 99233 SBSQ HOSP IP/OBS HIGH 50: CPT | Mod: 24,,, | Performed by: PHYSICIAN ASSISTANT

## 2018-11-14 PROCEDURE — 36415 COLL VENOUS BLD VENIPUNCTURE: CPT

## 2018-11-14 PROCEDURE — 83735 ASSAY OF MAGNESIUM: CPT

## 2018-11-14 PROCEDURE — 20600001 HC STEP DOWN PRIVATE ROOM

## 2018-11-14 RX ORDER — FUROSEMIDE 40 MG/1
80 TABLET ORAL 3 TIMES DAILY
Status: DISCONTINUED | OUTPATIENT
Start: 2018-11-14 | End: 2018-11-15

## 2018-11-14 RX ADMIN — PANTOPRAZOLE SODIUM 40 MG: 40 TABLET, DELAYED RELEASE ORAL at 09:11

## 2018-11-14 RX ADMIN — INSULIN ASPART 3 UNITS: 100 INJECTION, SOLUTION INTRAVENOUS; SUBCUTANEOUS at 12:11

## 2018-11-14 RX ADMIN — PANTOPRAZOLE SODIUM 40 MG: 40 TABLET, DELAYED RELEASE ORAL at 08:11

## 2018-11-14 RX ADMIN — FUROSEMIDE 80 MG: 40 TABLET ORAL at 06:11

## 2018-11-14 RX ADMIN — URSODIOL 300 MG: 300 CAPSULE ORAL at 08:11

## 2018-11-14 RX ADMIN — MYCOPHENOLATE MOFETIL 500 MG: 250 CAPSULE ORAL at 08:11

## 2018-11-14 RX ADMIN — URSODIOL 300 MG: 300 CAPSULE ORAL at 09:11

## 2018-11-14 RX ADMIN — TACROLIMUS 0.5 MG: 0.5 CAPSULE ORAL at 06:11

## 2018-11-14 RX ADMIN — HEPARIN SODIUM 5000 UNITS: 5000 INJECTION, SOLUTION INTRAVENOUS; SUBCUTANEOUS at 02:11

## 2018-11-14 RX ADMIN — TACROLIMUS 0.5 MG: 0.5 CAPSULE ORAL at 07:11

## 2018-11-14 RX ADMIN — MAGNESIUM OXIDE TAB 400 MG (241.3 MG ELEMENTAL MG) 800 MG: 400 (241.3 MG) TAB at 09:11

## 2018-11-14 RX ADMIN — INSULIN ASPART 10 UNITS: 100 INJECTION, SOLUTION INTRAVENOUS; SUBCUTANEOUS at 12:11

## 2018-11-14 RX ADMIN — ATOVAQUONE 1500 MG: 750 SUSPENSION ORAL at 09:11

## 2018-11-14 RX ADMIN — INSULIN ASPART 8 UNITS: 100 INJECTION, SOLUTION INTRAVENOUS; SUBCUTANEOUS at 08:11

## 2018-11-14 RX ADMIN — DOCUSATE SODIUM 100 MG: 100 CAPSULE, LIQUID FILLED ORAL at 08:11

## 2018-11-14 RX ADMIN — INSULIN ASPART 10 UNITS: 100 INJECTION, SOLUTION INTRAVENOUS; SUBCUTANEOUS at 06:11

## 2018-11-14 RX ADMIN — MIDODRINE HYDROCHLORIDE 15 MG: 5 TABLET ORAL at 02:11

## 2018-11-14 RX ADMIN — FLUCONAZOLE 200 MG: 200 TABLET ORAL at 09:11

## 2018-11-14 RX ADMIN — ASPIRIN 81 MG CHEWABLE TABLET 81 MG: 81 TABLET CHEWABLE at 08:11

## 2018-11-14 RX ADMIN — ENTECAVIR 0.5 MG: 0.5 TABLET ORAL at 09:11

## 2018-11-14 RX ADMIN — OXYCODONE HYDROCHLORIDE 5 MG: 5 TABLET ORAL at 09:11

## 2018-11-14 RX ADMIN — HEPARIN SODIUM 5000 UNITS: 5000 INJECTION, SOLUTION INTRAVENOUS; SUBCUTANEOUS at 09:11

## 2018-11-14 RX ADMIN — INSULIN ASPART 2 UNITS: 100 INJECTION, SOLUTION INTRAVENOUS; SUBCUTANEOUS at 06:11

## 2018-11-14 RX ADMIN — FUROSEMIDE 80 MG: 40 TABLET ORAL at 08:11

## 2018-11-14 RX ADMIN — HEPARIN SODIUM 5000 UNITS: 5000 INJECTION, SOLUTION INTRAVENOUS; SUBCUTANEOUS at 06:11

## 2018-11-14 RX ADMIN — PREDNISONE 15 MG: 5 TABLET ORAL at 08:11

## 2018-11-14 RX ADMIN — MAGNESIUM OXIDE TAB 400 MG (241.3 MG ELEMENTAL MG) 800 MG: 400 (241.3 MG) TAB at 08:11

## 2018-11-14 RX ADMIN — FUROSEMIDE 80 MG: 40 TABLET ORAL at 02:11

## 2018-11-14 RX ADMIN — MYCOPHENOLATE MOFETIL 500 MG: 250 CAPSULE ORAL at 09:11

## 2018-11-14 NOTE — PROGRESS NOTES
[facility]  Liver Transplant  Progress Note    Patient Name: Scarlett Reddy  MRN: [id number]  Admission Date: 10/1/2018  Hospital Length of Stay: 43 days  Code Status: Full Code  Primary Care Provider: Primary Doctor No  Post-Operative Day: 40    ORGAN:   LIVER  Disease Etiology: Cirrhosis: Type B and D  Donor Type:    - Brain Death  CDC High Risk:   No  Donor CMV Status:   Donor CMV Status: Positive  Donor HBcAB:   Negative  Donor HCV Status:   Negative  Donor HBV SETH: Negative  Donor HCV SETH: Negative  Whole or Partial: Whole Liver  Biliary Anastomosis: End to End  Arterial Anatomy: Replaced Left Hepatic and Right Hepatic  Subjective:     History of Present Illness:  Ms. Reddy is a 69 y/o female with PMH of ESLD secondary Hep B and D.  Listed for liver transplant with MELD 23.  Paracentesis 10/1 with 5L removed, no fluid sent for analysis.  Morning labs significant for hyponatremia, Na 119.  Pt admitted to LTS for sodium monitoring.        Hospital Course:  Hospital Course: 69 y/o F h/o HBV/delta cirrhosis s/p DBDLT 10/5/18 (CMV D+/R+, steroid induction, MMF/tacro/pred maintenance) with take back on 10/6 2/2 hyperbilirubinema and bilious VALORIE drainage, no leak identified. Post-op course c/b hypercapneic and hypoxic respiratory failure and was reintubated though quickly extubated now doing well. Liver bx (10/6) sig for Zone 3 hemorrhage and collapse, in addition to cholestasis. Transferred from ICU on POD #4. ID consulted to comment on positive diphtheroid culture from ascitic fluid (likely skin colonization) as well as ESBL Klebsiella pneumoniae in urine culture (10/4).  Pt asymptomatic and cell count from ascitic fluid unremarkable. Per ID recs, no need to treat diphtheroids or asymptomatic ESBL Kleb pneumo bacteriuria though pt has had 6 days of therapy which would cover these organisms. Mild peritransplant ascites- repeat US 10/6 with increased arterial resistive indices. Repeat US 10/9  w/ continued elevation in RIs and fluid collections. LFTs trending down nicely.     Acute gradual worsening of renal function s/p OLTx.Creatinine on arrival 0.8 and has been up trending. Nephrology consulted for post-op EVA. Cr cont to trend up but remains to have good UOP. Had stable response to lasiz diuresis.  BUN elevated requiring multiple rounds of dialysis (10/15, 10/17, 10/20, 10/31). No significant improvements in kidney function noted.   In addition, pt H/H cont to fluctuate requiring multiple blood transfusions (10/14, 10/16, 10/18, 10/19, 10/22, 10/27, 10/29).  Pt reported dark stools 10/15 but color has normalized. FOBT+. EGD 10/18 consistent with ulcerated mucosa in duodenum s/p coagulation. Concern for H. Pylori. Started Amoxicillin/levofloxacin (10/19, complete 11/2)). Remains on Protonix 40 mg BID. Biopsy negative for infection and CMV. Will cont close monitoring H/H, transfuse as needed with goal Hb > 7.0.  Of note, Urine cx + Klebsiella pneumoniae (10/13). Received 3 day course of ciprofloxacin, dc'd 10/19.   On 10/19 pm pt c/o crushing chest pain. Troponin 0.094, likely 2/2 ischemic stress. EKG NSR.   Liver US 11/6 showed no arterial flow. Repeat Liver US 11/8 showed arterial flow with increased RIs. Will Monitor. No plan for CTA 2/2 poor kidney function.  Paracentesis 11/7 with 8.9 L removed. Wbc 39, segs 16%. Gram stain no organisms seen + no WBC. Pt feels better since having fluid removed but she continues to have worsening abdominal distention & LE edema. Paracentesis 11/12 with 8.6L removed (WBC 34, segs 25%)    Interval history: No acute events overnight. Renal fx not improving, but relatively stable (Cr. 2.6, ). UOP 1.4 L yesterday. Per nephrology, no plans to dialyze in the near future. Removed central line. Will cont to diurese with lasix 80mg- given TID today. Poor PO intake, but improving. Wbc improving. Cellcept restarted at 500 mg bid on 11/6. CMV  on 11/1. CMV   11/8. Continue to hold Valcyte. H/H improved this morning and stable.  Liver US 11/6 showed no arterial flow, likely due to poor visualization 2/2 ascites. Post paracentesis repeat liver US 11/8 showed arterial flow with increased RIs.  No plan for CTA 2/2 poor kidney function. Will Monitor with plans for repeat liver u/s Thursday, 11/15.    Scheduled Meds:   aspirin  81 mg Oral Daily    atovaquone  1,500 mg Oral Daily    docusate sodium  100 mg Oral Daily    entecavir  0.5 mg Oral Q72H    fluconazole  200 mg Oral Daily    furosemide  80 mg Oral TID    heparin (porcine)  5,000 Units Subcutaneous Q8H    insulin aspart U-100  8-10 Units Subcutaneous TIDWM    magnesium oxide  800 mg Oral BID    midodrine  15 mg Oral TID    mirtazapine  7.5 mg Oral QHS    mycophenolate  500 mg Oral BID    pantoprazole  40 mg Oral BID    polyethylene glycol  17 g Oral Daily    predniSONE  15 mg Oral Daily    Followed by    [START ON 11/17/2018] predniSONE  10 mg Oral Daily    Followed by    [START ON 11/24/2018] predniSONE  5 mg Oral Daily    Followed by    [START ON 12/1/2018] predniSONE  2.5 mg Oral Daily    tacrolimus  0.5 mg Oral BID    ursodiol  300 mg Oral BID     Continuous Infusions:  PRN Meds:bisacodyl, bisacodyl, calcium carbonate, dextrose 50%, dextrose 50%, glucagon (human recombinant), glucose, glucose, insulin aspart U-100, ondansetron, ondansetron, oxyCODONE, oxyCODONE, polyethylene glycol, promethazine (PHENERGAN) IVPB, sodium chloride 0.9%    Review of Systems   Constitutional: Positive for activity change, appetite change and fatigue. Negative for chills and fever.   HENT: Negative for congestion, facial swelling and trouble swallowing.    Eyes: Negative for pain, discharge and visual disturbance.   Respiratory: Negative for cough, chest tightness, shortness of breath and wheezing.    Cardiovascular: Positive for leg swelling. Negative for chest pain and palpitations.   Gastrointestinal: Positive for  abdominal distention and abdominal pain (improved). Negative for blood in stool, constipation, diarrhea, nausea and vomiting.   Endocrine: Negative.    Genitourinary: Negative for decreased urine volume, difficulty urinating and dysuria.   Musculoskeletal: Negative for arthralgias, back pain, neck pain and neck stiffness.   Skin: Positive for wound.   Allergic/Immunologic: Positive for immunocompromised state.   Neurological: Positive for weakness. Negative for tremors and headaches.   Psychiatric/Behavioral: Negative for confusion and sleep disturbance.     Objective:     Vital Signs (Most Recent):  Temp: 97.7 °F (36.5 °C) (11/14/18 1138)  Pulse: 104 (11/14/18 1332)  Resp: 15 (11/14/18 1332)  BP: 116/67 (11/14/18 1332)  SpO2: 95 % (11/14/18 1332) Vital Signs (24h Range):  Temp:  [97.5 °F (36.4 °C)-98.3 °F (36.8 °C)] 97.7 °F (36.5 °C)  Pulse:  [] 104  Resp:  [9-15] 15  SpO2:  [95 %-99 %] 95 %  BP: (111-121)/(63-79) 116/67     Weight: 70.8 kg (156 lb 1.4 oz)  Body mass index is 27.65 kg/m².    Intake/Output - Last 3 Shifts       11/12 0700 - 11/13 0659 11/13 0700 - 11/14 0659 11/14 0700 - 11/15 0659    P.O. 780 1130     Total Intake(mL/kg) 780 (11) 1130 (16)     Urine (mL/kg/hr) 1725 (1) 1750 (1)     Other 8600      Stool 0 0     Total Output 30720 1750     Net -9545 -620            Stool Occurrence 1 x 2 x           Physical Exam   Constitutional: She is oriented to person, place, and time. She appears well-developed. No distress.   Temporal and distal extremity muscle wasting   HENT:   Head: Normocephalic and atraumatic.   Eyes: EOM are normal. No scleral icterus.   Neck: Normal range of motion.   Cardiovascular: Normal rate and regular rhythm.   No murmur heard.  Pulmonary/Chest: Effort normal. No respiratory distress. She has no wheezes.   Abdominal: Bowel sounds are normal. She exhibits distension (improved from yesterday). There is tenderness. There is no guarding.   Chevron incision w/ steri strips,  "minimal serous leakage from R side   RLQ VALORIE drain sites c/d/i with sutures   Musculoskeletal: Normal range of motion. She exhibits edema (2+ BLE, + generalized).   Neurological: She is alert and oriented to person, place, and time.   Skin: Skin is warm. She is not diaphoretic.   Psychiatric: She has a normal mood and affect.   Nursing note and vitals reviewed.      Laboratory:  Immunosuppressants         Stop Route Frequency     tacrolimus capsule 0.5 mg      -- Oral 2 times daily     mycophenolate capsule 500 mg      -- Oral 2 times daily        CBC:   Recent Labs   Lab 11/14/18  0720   WBC 4.44   RBC 2.89*   HGB 8.3*   HCT 26.5*   PLT 97*   MCV 92   MCH 28.7   MCHC 31.3*     CMP:   Recent Labs   Lab 11/14/18  0720   *   CALCIUM 8.4*   ALBUMIN 2.9*   PROT 4.4*   *   K 4.7   CO2 21*   CL 97   *   CREATININE 2.7*   ALKPHOS 104   ALT 20   AST 22   BILITOT 1.2*     Labs within the past 24 hours have been reviewed.    Diagnostic Results:  I have personally reviewed all pertinent imaging studies.    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   - POD 1 US: increased arterial resistive indices, suggestive of edema vs acute rejection vs congestion.  - Repeat US 10/9 with continued elevation of RIs.  - LFTs stable.  - T bili trending down.   - Repeat liver US (10/11): elevated RIs.  - Liver US 11/6 to assess for ascites. No arterial flow within the liver allograft concerning for interval thrombosis/occlusion of the arterial system. Severe ascites seen.  - Liver US 11/8: arterial flow seen w/ elevated RIs   - LFTs remain stable. No CTA at this time due to EVA and normal liver function.   - Plan for repeat liver US Thursday, 11/15.        Other drug-induced pancytopenia    - Decreased plt, wbc, h/h  - See "leukopenia"     " "  Thrombocytopenia, unspecified    - Improving.  - Monitor.       Alteration in skin integrity    Monitor.       Leukopenia    - Stopped Cellcept, Bactrim, and Valcyte 10/31 2/2 leukopenia  - Started Atovaquone 11/6.   - CMV PCR noted to be positive at 129. Will hold off on treatment with valcyte at this time.  - WBC improving   - Continue to monitor.       Azotemia    - CRRT 10/31 for uremic toxin clearance.           UTI (urinary tract infection)    - Urine Cx 10/15 w/ Klebsiella pneumonia.  - Started Ciprofloxacin (10/17-10/19).   - Started on amoxicillin/levo for H. Pylori (completed abx 11/2).  - Pt asymptomatic at this time.       Acute blood loss anemia    - H&H cont to fluctuate.   - FOBT +.  - EGD 10/18 - showed diffuse bleeding with ulcerated duodenal mucosa.  - EGD 11/5 - improvement seen, no active bleeding.  - 1 unit PRBCs transfused 10/27 and 11/3.   - H&H with good response.   - Resume heparin sq injections 11/6.   - See "anemia requiring transfusions."  - Continue to monitor.             Post LT - Acute renal failure    - Creatinine on arrival 0.8 and trended up.  - Nephrology consulted.  - Renal US (10/11): no hydronephrosis.  - Echo 10/12: diastolic dysfunction, likely 2/2 to ARF.  - CRRT 10/14; SLED 10/17, 10/20. --> no improvements noted in kidney fxn.  - 24 hr urine creatine collection: CrCl of 7.   - CRRT 10/31 with 1L removed.   - See "hypervolemia associated with renal insufficiency"     Hypervolemia associated with renal insufficiency    - CRRT 10/14, 10/17, 10/20, 10/31.  - CXR 10/30: abundant fluid in b/l pleural space.  - Restarted Lasix 40 BID x 2 doses (11/2).  - Lasix held and then restarted 11/9 at 40 mg BID per nephrology recs.  - Pt not showing improvement in fluid status.   - Paracentesis 11/12: 8.6L removed  - LE edema improving   - Continue 80 mg Lasix --increased to TID today         EVA (acute kidney injury)    - 11/11: Renal fx not improving. Cr 2.7, .  - No plans for " RRT in near future, per nephrology  - Trialysis line removed 11/13  - Nephrology involved. Appreciate recs.  - Monitor.     Metabolic acidosis    - CRRT 10/14.  - SLED 10/17, 10/20, 10/31.         At risk for opportunistic infections    - Valcyte for CMV prophylaxis--> holding (10/31) for leukopenia. Pt will need weekly CMV PCR while discontinued.  - CMV PCR noted to be positive at 129. .  - CMV PCR 11/8: 105  - Next CMV PCR due 11/15.  - Bactrim for PCP prophylaxis (MWF).--> holding (10/31) for leukopenia.   - Start Atovaquone 11/6.  - Fluconazole for fungal prophylaxis.         Long-term use of immunosuppressant medication    - Prograf: stopped 10/29. Resumed 11/2.  --> Will monitor for signs of toxic side effects, check daily troughs, and change meds accordingly.  - Prednisone: stopped 10/29. Started on Hydrocortisone. pred taper resumed.  - Cellcept: stopped 10/31 for leukopenia. Restarted Cellcept 500 mg bid, 11/6.         Prophylactic immunotherapy    - See long term use immunosuppresion.       Ascites    - Paracentesis 10/1, 5L removed, no fluid sent for analysis.  - Pt remains volume overloaded.   - Liver US 11/6 with severe ascites.   - Paracentesis 11/7 with 8.9 L removed. Wbc 39, segs 16%. Gram stain - no organisms seen and no WBC. Aerobic culture no growth. Anaerobic and fungal cultures pending.   - Paracentesis 11/13: 8.6 L removed. 34 wbc, 25 segs, 33 lymphs, 42 monocytes/macrophages. Cultures NGTD     Weakness    - See physical deconditioning.       Physical deconditioning    - PT/OT consulted.  - Recommend home health PT and rolling walker at discharge (orders placed 11/6).       Hyponatremia    - Na 119 on admit.  - Na was improving post transplant w HD.  - Na stable at this time.              Hypotension    - continue Midodrine 15 mg TID. Monitor       Anemia requiring transfusions    - Fluctuating H&H.    - Transfusions: 10/14; 10/16; 10/18; 10/19; 10/22; 10/27; 10/29, 10/31  - FOBT +  - LDH  elevated, Hapto < 10.  - EGD 10/18: ulcerated duodenal mucosa.   - Consulted GI about this ongoing issue and further need of another EGD.  - EGD 11/5 unremarkable.   - H/H decreased again 11/13: 7.2/22.6 from 8.5/26.5  - Repeat H/H stable  - Iron studies 11/13:  95, TIBC 107, sat iron 89, transferrin 72, ferritin pending  - Hemolytic anemia workup 11/13: Haptoglobin 132, retic 3.9   - Will continue to monitor closely.           Severe malnutrition    - Dietary consulted.   - Temporal and distal extremity muscle wasting and edema.  - Encourage supplements and to increase nutritional intake.  - Per nephrology, pt to be on low protein diet.   - Prealbumin reviewed.      Type 2 diabetes mellitus with diabetic polyneuropathy, with long-term current use of insulin    - Continue home regimen.  - Endocrine consulted.  - Pt off insulin drip at this time.  Apprec endo recs.        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  - Tx w/ Entecavir- dose changed to q72 hours given renal function.             VTE Risk Mitigation (From admission, onward)        Ordered     heparin (porcine) injection 5,000 Units  Every 8 hours      11/06/18 1021     IP VTE HIGH RISK PATIENT  Once      11/05/18 1145     Place sequential compression device  Until discontinued      10/05/18 0709          The patients clinical status was discussed at multidisplinary rounds, involving transplant surgery, transplant medicine, pharmacy, nursing, nutrition, and social work    Discharge Planning:  No Patient Care Coordination Note on file.      Jihan Soares PA-C  Liver Transplant  [facility]

## 2018-11-14 NOTE — ASSESSMENT & PLAN NOTE
- Fluctuating H&H.    - Transfusions: 10/14; 10/16; 10/18; 10/19; 10/22; 10/27; 10/29, 10/31  - FOBT +  - LDH elevated, Hapto < 10.  - EGD 10/18: ulcerated duodenal mucosa.   - Consulted GI about this ongoing issue and further need of another EGD.  - EGD 11/5 unremarkable.   - H/H decreased again 11/13: 7.2/22.6 from 8.5/26.5  - Repeat H/H stable  - Iron studies 11/13:  95, TIBC 107, sat iron 89, transferrin 72, ferritin pending  - Hemolytic anemia workup 11/13: Haptoglobin 132, retic 3.9   - Will continue to monitor closely.

## 2018-11-14 NOTE — ASSESSMENT & PLAN NOTE
- Valcyte for CMV prophylaxis--> holding (10/31) for leukopenia. Pt will need weekly CMV PCR while discontinued.  - CMV PCR noted to be positive at 129. .  - CMV PCR 11/8: 105  - Next CMV PCR due 11/15.  - Bactrim for PCP prophylaxis ([facility]).--> holding (10/31) for leukopenia.   - Start Atovaquone 11/6.  - Fluconazole for fungal prophylaxis.

## 2018-11-14 NOTE — ASSESSMENT & PLAN NOTE
- Stopped Cellcept, Bactrim, and Valcyte 10/31 2/2 leukopenia  - Started Atovaquone 11/6.   - CMV PCR noted to be positive at 129. Will hold off on treatment with valcyte at this time.  - WBC improving   - Continue to monitor.

## 2018-11-14 NOTE — PLAN OF CARE
Problem: Patient Care Overview  Goal: Plan of Care Review  Outcome: Ongoing (interventions implemented as appropriate)  Pt is AAOx4, stand by assist with walker, and VSS. Pt denies pain. Heparin injection given. Blood glucose monitoring performed and treated as ordered. Pt ambulated in hallway with . Pt is up in chair, call bell within reach, and nonskid socks on. Will continue to monitor.

## 2018-11-14 NOTE — SUBJECTIVE & OBJECTIVE
Scheduled Meds:   aspirin  81 mg Oral Daily    atovaquone  1,500 mg Oral Daily    docusate sodium  100 mg Oral Daily    entecavir  0.5 mg Oral Q72H    fluconazole  200 mg Oral Daily    furosemide  80 mg Oral TID    heparin (porcine)  5,000 Units Subcutaneous Q8H    insulin aspart U-100  8-10 Units Subcutaneous TIDWM    magnesium oxide  800 mg Oral BID    midodrine  15 mg Oral TID    mirtazapine  7.5 mg Oral QHS    mycophenolate  500 mg Oral BID    pantoprazole  40 mg Oral BID    polyethylene glycol  17 g Oral Daily    predniSONE  15 mg Oral Daily    Followed by    [START ON 11/17/2018] predniSONE  10 mg Oral Daily    Followed by    [START ON 11/24/2018] predniSONE  5 mg Oral Daily    Followed by    [START ON 12/1/2018] predniSONE  2.5 mg Oral Daily    tacrolimus  0.5 mg Oral BID    ursodiol  300 mg Oral BID     Continuous Infusions:  PRN Meds:bisacodyl, bisacodyl, calcium carbonate, dextrose 50%, dextrose 50%, glucagon (human recombinant), glucose, glucose, insulin aspart U-100, ondansetron, ondansetron, oxyCODONE, oxyCODONE, polyethylene glycol, promethazine (PHENERGAN) IVPB, sodium chloride 0.9%    Review of Systems   Constitutional: Positive for activity change, appetite change and fatigue. Negative for chills and fever.   HENT: Negative for congestion, facial swelling and trouble swallowing.    Eyes: Negative for pain, discharge and visual disturbance.   Respiratory: Negative for cough, chest tightness, shortness of breath and wheezing.    Cardiovascular: Positive for leg swelling. Negative for chest pain and palpitations.   Gastrointestinal: Positive for abdominal distention and abdominal pain (improved). Negative for blood in stool, constipation, diarrhea, nausea and vomiting.   Endocrine: Negative.    Genitourinary: Negative for decreased urine volume, difficulty urinating and dysuria.   Musculoskeletal: Negative for arthralgias, back pain, neck pain and neck stiffness.   Skin: Positive  for wound.   Allergic/Immunologic: Positive for immunocompromised state.   Neurological: Positive for weakness. Negative for tremors and headaches.   Psychiatric/Behavioral: Negative for confusion and sleep disturbance.     Objective:     Vital Signs (Most Recent):  Temp: 97.7 °F (36.5 °C) (11/14/18 1138)  Pulse: 104 (11/14/18 1332)  Resp: 15 (11/14/18 1332)  BP: 116/67 (11/14/18 1332)  SpO2: 95 % (11/14/18 1332) Vital Signs (24h Range):  Temp:  [97.5 °F (36.4 °C)-98.3 °F (36.8 °C)] 97.7 °F (36.5 °C)  Pulse:  [] 104  Resp:  [9-15] 15  SpO2:  [95 %-99 %] 95 %  BP: (111-121)/(63-79) 116/67     Weight: 70.8 kg (156 lb 1.4 oz)  Body mass index is 27.65 kg/m².    Intake/Output - Last 3 Shifts       11/12 0700 - 11/13 0659 11/13 0700 - 11/14 0659 11/14 0700 - 11/15 0659    P.O. 780 1130     Total Intake(mL/kg) 780 (11) 1130 (16)     Urine (mL/kg/hr) 1725 (1) 1750 (1)     Other 8600      Stool 0 0     Total Output 83360 1750     Net -9545 -620            Stool Occurrence 1 x 2 x           Physical Exam   Constitutional: She is oriented to person, place, and time. She appears well-developed. No distress.   Temporal and distal extremity muscle wasting   HENT:   Head: Normocephalic and atraumatic.   Eyes: EOM are normal. No scleral icterus.   Neck: Normal range of motion.   Cardiovascular: Normal rate and regular rhythm.   No murmur heard.  Pulmonary/Chest: Effort normal. No respiratory distress. She has no wheezes.   Abdominal: Bowel sounds are normal. She exhibits distension (improved from yesterday). There is tenderness. There is no guarding.   Chevron incision w/ steri strips, minimal serous leakage from R side   RLQ VALORIE drain sites c/d/i with sutures   Musculoskeletal: Normal range of motion. She exhibits edema (2+ BLE, + generalized).   Neurological: She is alert and oriented to person, place, and time.   Skin: Skin is warm. She is not diaphoretic.   Psychiatric: She has a normal mood and affect.   Nursing note and  vitals reviewed.      Laboratory:  Immunosuppressants         Stop Route Frequency     tacrolimus capsule 0.5 mg      -- Oral 2 times daily     mycophenolate capsule 500 mg      -- Oral 2 times daily        CBC:   Recent Labs   Lab 11/14/18  0720   WBC 4.44   RBC 2.89*   HGB 8.3*   HCT 26.5*   PLT 97*   MCV 92   MCH 28.7   MCHC 31.3*     CMP:   Recent Labs   Lab 11/14/18  0720   *   CALCIUM 8.4*   ALBUMIN 2.9*   PROT 4.4*   *   K 4.7   CO2 21*   CL 97   *   CREATININE 2.7*   ALKPHOS 104   ALT 20   AST 22   BILITOT 1.2*     Labs within the past 24 hours have been reviewed.    Diagnostic Results:  I have personally reviewed all pertinent imaging studies.

## 2018-11-14 NOTE — SUBJECTIVE & OBJECTIVE
Interval History:   States lower extremity edema moderately improved. No acute events overnight. Continues complaining of abdominal and back pain.     Review of patient's allergies indicates:  No Known Allergies  Current Facility-Administered Medications   Medication Frequency    aspirin chewable tablet 81 mg Daily    atovaquone suspension 1,500 mg Daily    bisacodyl EC tablet 10 mg Daily PRN    bisacodyl suppository 10 mg Daily PRN    calcium carbonate 200 mg calcium (500 mg) chewable tablet 500 mg TID PRN    dextrose 50% injection 12.5 g PRN    dextrose 50% injection 25 g PRN    docusate sodium capsule 100 mg Daily    entecavir tablet 0.5 mg Q72H    fluconazole tablet 200 mg Daily    furosemide tablet 80 mg BID    glucagon (human recombinant) injection 1 mg PRN    glucose chewable tablet 16 g PRN    glucose chewable tablet 24 g PRN    heparin (porcine) injection 5,000 Units Q8H    insulin aspart U-100 pen 0-5 Units QID (AC + HS) PRN    insulin aspart U-100 pen 8-10 Units TIDWM    magnesium oxide tablet 800 mg BID    midodrine tablet 15 mg TID    mirtazapine tablet 7.5 mg QHS    mycophenolate capsule 500 mg BID    ondansetron disintegrating tablet 8 mg Q8H PRN    ondansetron injection 4 mg Q8H PRN    oxyCODONE immediate release tablet 5 mg Q4H PRN    oxyCODONE immediate release tablet Tab 10 mg Q4H PRN    pantoprazole EC tablet 40 mg BID    polyethylene glycol packet 17 g Daily PRN    polyethylene glycol packet 17 g Daily    predniSONE tablet 15 mg Daily    Followed by    [START ON 11/17/2018] predniSONE tablet 10 mg Daily    Followed by    [START ON 11/24/2018] predniSONE tablet 5 mg Daily    Followed by    [START ON 12/1/2018] predniSONE tablet 2.5 mg Daily    promethazine (PHENERGAN) 6.25 mg in dextrose 5 % 50 mL IVPB Q6H PRN    sodium chloride 0.9% flush 3 mL PRN    tacrolimus capsule 0.5 mg BID    ursodiol capsule 300 mg BID       Objective:     Vital Signs (Most  Recent):  Temp: 97.5 °F (36.4 °C) (11/14/18 0744)  Pulse: (!) 122 (11/14/18 0925)  Resp: 14 (11/14/18 0925)  BP: 112/65 (11/14/18 0925)  SpO2: 97 % (11/14/18 0400)  O2 Device (Oxygen Therapy): room air (11/14/18 0812) Vital Signs (24h Range):  Temp:  [97.5 °F (36.4 °C)-98.3 °F (36.8 °C)] 97.5 °F (36.4 °C)  Pulse:  [] 122  Resp:  [9-15] 14  SpO2:  [97 %-99 %] 97 %  BP: (111-142)/(63-85) 112/65     Weight: 70.8 kg (156 lb 1.4 oz) (11/12/18 0530)  Body mass index is 27.65 kg/m².  Body surface area is 1.77 meters squared.    I/O last 3 completed shifts:  In: 1550 [P.O.:1550]  Out: 2900 [Urine:2900]    Physical Exam   Constitutional: She is oriented to person, place, and time. She appears well-developed. No distress.   Temporal and distal extremity muscle wasting   HENT:   Head: Normocephalic and atraumatic.   Eyes: EOM are normal. No scleral icterus.   Neck: Normal range of motion.   Cardiovascular: Normal rate and regular rhythm.   No murmur heard.  Pulmonary/Chest: Effort normal. No respiratory distress. She has no wheezes.   Decreased at bases   Abdominal: Bowel sounds are normal. She exhibits distension (improved from yesterday). There is tenderness. There is no guarding.   Chevron incision w/ steri strips, minimal serous leakage from R side   RLQ VALORIE drain sites c/d/i with sutures   Musculoskeletal: Normal range of motion. She exhibits edema (2+ BLE, + generalized).   Neurological: She is alert and oriented to person, place, and time.   Skin: Skin is warm. She is not diaphoretic.   Psychiatric: She has a normal mood and affect.   Nursing note and vitals reviewed.      Significant Labs:  CMP:   Recent Labs   Lab 11/14/18  0720   *   CALCIUM 8.4*   ALBUMIN 2.9*   PROT 4.4*   *   K 4.7   CO2 21*   CL 97   *   CREATININE 2.7*   ALKPHOS 104   ALT 20   AST 22   BILITOT 1.2*     All labs within the past 24 hours have been reviewed.

## 2018-11-14 NOTE — PROGRESS NOTES
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 Post-op course c/b hypercapneic and hypoxic respiratory failure and was reintubated though quickly extubated now doing well. Liver bx (10/6) sig for Zone 3 hemorrhage and collapse, in addition to cholestasis. Transferred from ICU on POD #4. ID consulted to comment on positive diphtheroid culture from ascitic fluid (likely skin colonization) as well as ESBL Klebsiella pneumoniae in urine culture (10/4).  Pt asymptomatic and cell count from ascitic fluid unremarkable. Per ID recs, no need to treat diphtheroids or asymptomatic ESBL Kleb pneumo bacteriuria though pt has had 6 days of therapy which would cover these organisms. Mild peritransplant ascites- repeat US 10/6 with increased arterial resistive indices. Repeat US 10/9 w/ continued elevation in RIs and fluid collections. LFTs trending down nicely.      Acute gradual worsening of renal function s/p OLTx.Creatinine on arrival 0.8 and has been up trending. Nephrology consulted for post-op EVA. Cr cont to trend up but remains to have good UOP. Had stable response to lasiz diuresis.  BUN elevated requiring multiple rounds of dialysis (10/15, 10/17, 10/20, 10/31). No significant improvements in kidney function noted.   In addition, pt H/H cont to fluctuate requiring multiple blood transfusions (10/14, 10/16, 10/18, 10/19, 10/22, 10/27, 10/29).  Pt reported dark stools 10/15 but color has normalized. FOBT+. EGD 10/18 consistent with ulcerated mucosa in duodenum s/p  coagulation. Concern for H. Pylori. Started Amoxicillin/levofloxacin (10/19, complete 11/2)). Remains on Protonix 40 mg BID. Biopsy negative for infection and CMV. Will cont close monitoring H/H, transfuse as needed with goal Hb > 7.0.  Of note, Urine cx + Klebsiella pneumoniae (10/13). Received 3 day course of ciprofloxacin, dc'd 10/19.   On 10/19 pm pt c/o crushing chest pain. Troponin 0.094, likely 2/2 ischemic stress. EKG NSR.      11/5/18 - no acute events overnight. Pt reports feeling well this am. One unit of PRBCs given 11/3 with good response. Cr level noted with only minimal increases. Lasix placed on hold per nephrology recs. Will plan to hydrate today w/ albumin. EGD 11/5 unimpressive for overt bleed. Pt remains on protonix 40 BID. Denies melena. Cont to hold heparin. WBC cont to decrease. Immunosuppression managed with hydrocortisone 50 (cont to hold Cellcept, bactrim, valcyte). Prograf now restarted and transitioned IV steroids to PO prednisone on 11/3. CMV PCR noted with 129. Cont to monitor.     Interval History:   States lower extremity edema moderately improved. No acute events overnight. Continues complaining of abdominal and back pain.     Review of patient's allergies indicates:  No Known Allergies  Current Facility-Administered Medications   Medication Frequency    aspirin chewable tablet 81 mg Daily    atovaquone suspension 1,500 mg Daily    bisacodyl EC tablet 10 mg Daily PRN    bisacodyl suppository 10 mg Daily PRN    calcium carbonate 200 mg calcium (500 mg) chewable tablet 500 mg TID PRN    dextrose 50% injection 12.5 g PRN    dextrose 50% injection 25 g PRN    docusate sodium capsule 100 mg Daily    entecavir tablet 0.5 mg Q72H    fluconazole tablet 200 mg Daily    furosemide tablet 80 mg BID    glucagon (human recombinant) injection 1 mg PRN    glucose chewable tablet 16 g PRN    glucose chewable tablet 24 g PRN    heparin (porcine) injection 5,000 Units Q8H    insulin  aspart U-100 pen 0-5 Units QID (AC + HS) PRN    insulin aspart U-100 pen 8-10 Units TIDWM    magnesium oxide tablet 800 mg BID    midodrine tablet 15 mg TID    mirtazapine tablet 7.5 mg QHS    mycophenolate capsule 500 mg BID    ondansetron disintegrating tablet 8 mg Q8H PRN    ondansetron injection 4 mg Q8H PRN    oxyCODONE immediate release tablet 5 mg Q4H PRN    oxyCODONE immediate release tablet Tab 10 mg Q4H PRN    pantoprazole EC tablet 40 mg BID    polyethylene glycol packet 17 g Daily PRN    polyethylene glycol packet 17 g Daily    predniSONE tablet 15 mg Daily    Followed by    [START ON 11/17/2018] predniSONE tablet 10 mg Daily    Followed by    [START ON 11/24/2018] predniSONE tablet 5 mg Daily    Followed by    [START ON 12/1/2018] predniSONE tablet 2.5 mg Daily    promethazine (PHENERGAN) 6.25 mg in dextrose 5 % 50 mL IVPB Q6H PRN    sodium chloride 0.9% flush 3 mL PRN    tacrolimus capsule 0.5 mg BID    ursodiol capsule 300 mg BID       Objective:     Vital Signs (Most Recent):  Temp: 97.5 °F (36.4 °C) (11/14/18 0744)  Pulse: (!) 122 (11/14/18 0925)  Resp: 14 (11/14/18 0925)  BP: 112/65 (11/14/18 0925)  SpO2: 97 % (11/14/18 0400)  O2 Device (Oxygen Therapy): room air (11/14/18 0812) Vital Signs (24h Range):  Temp:  [97.5 °F (36.4 °C)-98.3 °F (36.8 °C)] 97.5 °F (36.4 °C)  Pulse:  [] 122  Resp:  [9-15] 14  SpO2:  [97 %-99 %] 97 %  BP: (111-142)/(63-85) 112/65     Weight: 70.8 kg (156 lb 1.4 oz) (11/12/18 0530)  Body mass index is 27.65 kg/m².  Body surface area is 1.77 meters squared.    I/O last 3 completed shifts:  In: 1550 [P.O.:1550]  Out: 2900 [Urine:2900]    Physical Exam   Constitutional: She is oriented to person, place, and time. She appears well-developed. No distress.   Temporal and distal extremity muscle wasting   HENT:   Head: Normocephalic and atraumatic.   Eyes: EOM are normal. No scleral icterus.   Neck: Normal range of motion.   Cardiovascular: Normal rate and  regular rhythm.   No murmur heard.  Pulmonary/Chest: Effort normal. No respiratory distress. She has no wheezes.   Decreased at bases   Abdominal: Bowel sounds are normal. She exhibits distension (improved from yesterday). There is tenderness. There is no guarding.   Chevron incision w/ steri strips, minimal serous leakage from R side   RLQ VALORIE drain sites c/d/i with sutures   Musculoskeletal: Normal range of motion. She exhibits edema (2+ BLE, + generalized).   Neurological: She is alert and oriented to person, place, and time.   Skin: Skin is warm. She is not diaphoretic.   Psychiatric: She has a normal mood and affect.   Nursing note and vitals reviewed.      Significant Labs:  CMP:   Recent Labs   Lab 11/14/18  0720   *   CALCIUM 8.4*   ALBUMIN 2.9*   PROT 4.4*   *   K 4.7   CO2 21*   CL 97   *   CREATININE 2.7*   ALKPHOS 104   ALT 20   AST 22   BILITOT 1.2*     All labs within the past 24 hours have been reviewed.       Assessment/Plan:      Post LT - Acute renal failure    Post-LT EVA is multifactorial in origin and has been related to the severity of liver disease, toxicity of immunosuppressive therapy and hepatic ischaemia reperfusion injury might be a driving force in the aetiology of post-LT EVA.     Plan:  - No indication for CRRT today; can remove HD line; patient no long oliguric.   - Patient worsening azotemia likely secondary to her H/H dropping from GI bleed last week.   - Stable azotemia; creatinine is stable at 2.7 (2.6 yesterday).   - No uremic symptoms or signs.   - Continue furosemide at 80 mg PO BID; still with significant pitting LE edema and evidence of fluid overload  - Total UOP 24 hours 1750 ml with net -620 ml.         Thank you for your consult. I will follow-up with patient. Please contact us if you have any additional questions. Case discussed with staff attending Dr. Melissa; attestation to follow.     Edward Dye MD  PGY-3 Internal  Medicine  [phone]    Nephrology  [facility]

## 2018-11-14 NOTE — ASSESSMENT & PLAN NOTE
- Paracentesis 10/1, 5L removed, no fluid sent for analysis.  - Pt remains volume overloaded.   - Liver US 11/6 with severe ascites.   - Paracentesis 11/7 with 8.9 L removed. Wbc 39, segs 16%. Gram stain - no organisms seen and no WBC. Aerobic culture no growth. Anaerobic and fungal cultures pending.   - Paracentesis 11/13: 8.6 L removed. 34 wbc, 25 segs, 33 lymphs, 42 monocytes/macrophages. Cultures NGTD

## 2018-11-14 NOTE — ASSESSMENT & PLAN NOTE
- CRRT 10/14, 10/17, 10/20, 10/31.  - CXR 10/30: abundant fluid in b/l pleural space.  - Restarted Lasix 40 BID x 2 doses (11/2).  - Lasix held and then restarted 11/9 at 40 mg BID per nephrology recs.  - Pt not showing improvement in fluid status.   - Paracentesis 11/12: 8.6L removed  - LE edema improving   - Continue 80 mg Lasix --increased to TID today

## 2018-11-14 NOTE — ASSESSMENT & PLAN NOTE
- 11/11: Renal fx not improving. Cr 2.7, .  - No plans for RRT in near future, per nephrology  - Trialysis line removed 11/13  - Nephrology involved. Appreciate recs.  - Monitor.

## 2018-11-14 NOTE — ASSESSMENT & PLAN NOTE
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     Plan:  - No indication for CRRT today; can remove HD line; patient no long oliguric.   - Patient worsening azotemia likely secondary to her H/H dropping from GI bleed last week.   - Stable azotemia; creatinine is stable at 2.7 (2.6 yesterday).   - No uremic symptoms or signs.   - Continue furosemide at 80 mg PO BID; still with significant pitting LE edema and evidence of fluid overload  - Total UOP 24 hours 1750 ml with net -620 ml.

## 2018-11-15 LAB
ALBUMIN SERPL BCP-MCNC: 3.1 G/DL
ALP SERPL-CCNC: 133 U/L
ALT SERPL W/O P-5'-P-CCNC: 22 U/L
ANION GAP SERPL CALC-SCNC: 13 MMOL/L
AST SERPL-CCNC: 20 U/L
BACTERIA SPEC AEROBE CULT: NO GROWTH
BASOPHILS # BLD AUTO: 0.02 K/UL
BASOPHILS NFR BLD: 0.4 %
BILIRUB SERPL-MCNC: 1.5 MG/DL
BUN SERPL-MCNC: 103 MG/DL
CALCIUM SERPL-MCNC: 8.7 MG/DL
CHLORIDE SERPL-SCNC: 94 MMOL/L
CO2 SERPL-SCNC: 24 MMOL/L
CREAT SERPL-MCNC: 2.8 MG/DL
DIFFERENTIAL METHOD: ABNORMAL
EOSINOPHIL # BLD AUTO: 0 K/UL
EOSINOPHIL NFR BLD: 0.2 %
ERYTHROCYTE [DISTWIDTH] IN BLOOD BY AUTOMATED COUNT: 17.4 %
EST. GFR  (AFRICAN AMERICAN): 19.1 ML/MIN/1.73 M^2
EST. GFR  (NON AFRICAN AMERICAN): 16.6 ML/MIN/1.73 M^2
GLUCOSE SERPL-MCNC: 217 MG/DL
HCT VFR BLD AUTO: 29.8 %
HGB BLD-MCNC: 9.4 G/DL
IMM GRANULOCYTES # BLD AUTO: 0.12 K/UL
IMM GRANULOCYTES NFR BLD AUTO: 2.3 %
INR PPP: 0.9
LYMPHOCYTES # BLD AUTO: 0.4 K/UL
LYMPHOCYTES NFR BLD: 6.7 %
MAGNESIUM SERPL-MCNC: 2.2 MG/DL
MCH RBC QN AUTO: 29.2 PG
MCHC RBC AUTO-ENTMCNC: 31.5 G/DL
MCV RBC AUTO: 93 FL
MONOCYTES # BLD AUTO: 0.3 K/UL
MONOCYTES NFR BLD: 6.3 %
NEUTROPHILS # BLD AUTO: 4.4 K/UL
NEUTROPHILS NFR BLD: 84.1 %
NRBC BLD-RTO: 0 /100 WBC
PHOSPHATE SERPL-MCNC: 3.3 MG/DL
PLATELET # BLD AUTO: 128 K/UL
PMV BLD AUTO: 10.5 FL
POCT GLUCOSE: 167 MG/DL (ref 70–110)
POCT GLUCOSE: 177 MG/DL (ref 70–110)
POCT GLUCOSE: 203 MG/DL (ref 70–110)
POCT GLUCOSE: 225 MG/DL (ref 70–110)
POTASSIUM SERPL-SCNC: 4.9 MMOL/L
PROT SERPL-MCNC: 4.9 G/DL
PROTHROMBIN TIME: 9.9 SEC
RBC # BLD AUTO: 3.22 M/UL
SODIUM SERPL-SCNC: 131 MMOL/L
SPECIMEN SOURCE: NORMAL
TACROLIMUS BLD-MCNC: 5.6 NG/ML
TRIGL FLD-MCNC: 637 MG/DL
WBC # BLD AUTO: 5.22 K/UL

## 2018-11-15 PROCEDURE — 84100 ASSAY OF PHOSPHORUS: CPT

## 2018-11-15 PROCEDURE — 85025 COMPLETE CBC W/AUTO DIFF WBC: CPT

## 2018-11-15 PROCEDURE — 25000003 PHARM REV CODE 250: Performed by: NURSE PRACTITIONER

## 2018-11-15 PROCEDURE — 25000003 PHARM REV CODE 250: Performed by: PHYSICIAN ASSISTANT

## 2018-11-15 PROCEDURE — 94799 UNLISTED PULMONARY SVC/PX: CPT

## 2018-11-15 PROCEDURE — 25000003 PHARM REV CODE 250: Performed by: TRANSPLANT SURGERY

## 2018-11-15 PROCEDURE — 27000646 HC AEROBIKA DEVICE

## 2018-11-15 PROCEDURE — 63600175 PHARM REV CODE 636 W HCPCS: Performed by: PHYSICIAN ASSISTANT

## 2018-11-15 PROCEDURE — 80053 COMPREHEN METABOLIC PANEL: CPT

## 2018-11-15 PROCEDURE — 94761 N-INVAS EAR/PLS OXIMETRY MLT: CPT

## 2018-11-15 PROCEDURE — 83735 ASSAY OF MAGNESIUM: CPT

## 2018-11-15 PROCEDURE — 99900035 HC TECH TIME PER 15 MIN (STAT)

## 2018-11-15 PROCEDURE — 99233 SBSQ HOSP IP/OBS HIGH 50: CPT | Mod: ,,, | Performed by: INTERNAL MEDICINE

## 2018-11-15 PROCEDURE — 20600001 HC STEP DOWN PRIVATE ROOM

## 2018-11-15 PROCEDURE — 80197 ASSAY OF TACROLIMUS: CPT

## 2018-11-15 PROCEDURE — 85610 PROTHROMBIN TIME: CPT

## 2018-11-15 PROCEDURE — 94664 DEMO&/EVAL PT USE INHALER: CPT

## 2018-11-15 PROCEDURE — 99233 SBSQ HOSP IP/OBS HIGH 50: CPT | Mod: 24,,, | Performed by: PHYSICIAN ASSISTANT

## 2018-11-15 PROCEDURE — 63600175 PHARM REV CODE 636 W HCPCS: Performed by: NURSE PRACTITIONER

## 2018-11-15 RX ORDER — HEPARIN SODIUM 5000 [USP'U]/ML
5000 INJECTION, SOLUTION INTRAVENOUS; SUBCUTANEOUS EVERY 12 HOURS
Status: DISCONTINUED | OUTPATIENT
Start: 2018-11-15 | End: 2018-11-17

## 2018-11-15 RX ORDER — TRAMADOL HYDROCHLORIDE 50 MG/1
50 TABLET ORAL EVERY 6 HOURS PRN
Status: DISCONTINUED | OUTPATIENT
Start: 2018-11-15 | End: 2018-12-21 | Stop reason: HOSPADM

## 2018-11-15 RX ORDER — FUROSEMIDE 40 MG/1
80 TABLET ORAL 2 TIMES DAILY
Status: DISCONTINUED | OUTPATIENT
Start: 2018-11-15 | End: 2018-11-22

## 2018-11-15 RX ORDER — TRAMADOL HYDROCHLORIDE 50 MG/1
50 TABLET ORAL EVERY 6 HOURS PRN
Status: DISCONTINUED | OUTPATIENT
Start: 2018-11-15 | End: 2018-11-15

## 2018-11-15 RX ORDER — ACETAMINOPHEN 325 MG/1
650 TABLET ORAL EVERY 6 HOURS PRN
Status: DISCONTINUED | OUTPATIENT
Start: 2018-11-15 | End: 2018-12-21 | Stop reason: HOSPADM

## 2018-11-15 RX ADMIN — INSULIN ASPART 10 UNITS: 100 INJECTION, SOLUTION INTRAVENOUS; SUBCUTANEOUS at 10:11

## 2018-11-15 RX ADMIN — BISACODYL 10 MG: 5 TABLET, COATED ORAL at 09:11

## 2018-11-15 RX ADMIN — INSULIN ASPART 8 UNITS: 100 INJECTION, SOLUTION INTRAVENOUS; SUBCUTANEOUS at 09:11

## 2018-11-15 RX ADMIN — ATOVAQUONE 1500 MG: 750 SUSPENSION ORAL at 10:11

## 2018-11-15 RX ADMIN — INSULIN ASPART 2 UNITS: 100 INJECTION, SOLUTION INTRAVENOUS; SUBCUTANEOUS at 04:11

## 2018-11-15 RX ADMIN — FUROSEMIDE 80 MG: 40 TABLET ORAL at 05:11

## 2018-11-15 RX ADMIN — MAGNESIUM OXIDE TAB 400 MG (241.3 MG ELEMENTAL MG) 800 MG: 400 (241.3 MG) TAB at 09:11

## 2018-11-15 RX ADMIN — INSULIN ASPART 2 UNITS: 100 INJECTION, SOLUTION INTRAVENOUS; SUBCUTANEOUS at 10:11

## 2018-11-15 RX ADMIN — MAGNESIUM OXIDE TAB 400 MG (241.3 MG ELEMENTAL MG) 800 MG: 400 (241.3 MG) TAB at 11:11

## 2018-11-15 RX ADMIN — MIDODRINE HYDROCHLORIDE 15 MG: 5 TABLET ORAL at 09:11

## 2018-11-15 RX ADMIN — MYCOPHENOLATE MOFETIL 500 MG: 250 CAPSULE ORAL at 09:11

## 2018-11-15 RX ADMIN — PANTOPRAZOLE SODIUM 40 MG: 40 TABLET, DELAYED RELEASE ORAL at 09:11

## 2018-11-15 RX ADMIN — HEPARIN SODIUM 5000 UNITS: 5000 INJECTION, SOLUTION INTRAVENOUS; SUBCUTANEOUS at 09:11

## 2018-11-15 RX ADMIN — FUROSEMIDE 80 MG: 40 TABLET ORAL at 10:11

## 2018-11-15 RX ADMIN — HEPARIN SODIUM 5000 UNITS: 5000 INJECTION, SOLUTION INTRAVENOUS; SUBCUTANEOUS at 10:11

## 2018-11-15 RX ADMIN — DOCUSATE SODIUM 100 MG: 100 CAPSULE, LIQUID FILLED ORAL at 10:11

## 2018-11-15 RX ADMIN — URSODIOL 300 MG: 300 CAPSULE ORAL at 09:11

## 2018-11-15 RX ADMIN — FLUCONAZOLE 200 MG: 200 TABLET ORAL at 10:11

## 2018-11-15 RX ADMIN — MYCOPHENOLATE MOFETIL 500 MG: 250 CAPSULE ORAL at 10:11

## 2018-11-15 RX ADMIN — MIDODRINE HYDROCHLORIDE 15 MG: 5 TABLET ORAL at 04:11

## 2018-11-15 RX ADMIN — TACROLIMUS 0.5 MG: 0.5 CAPSULE ORAL at 05:11

## 2018-11-15 RX ADMIN — INSULIN ASPART 10 UNITS: 100 INJECTION, SOLUTION INTRAVENOUS; SUBCUTANEOUS at 04:11

## 2018-11-15 RX ADMIN — TACROLIMUS 0.5 MG: 0.5 CAPSULE ORAL at 08:11

## 2018-11-15 RX ADMIN — URSODIOL 300 MG: 300 CAPSULE ORAL at 11:11

## 2018-11-15 RX ADMIN — PANTOPRAZOLE SODIUM 40 MG: 40 TABLET, DELAYED RELEASE ORAL at 10:11

## 2018-11-15 RX ADMIN — PREDNISONE 15 MG: 5 TABLET ORAL at 10:11

## 2018-11-15 NOTE — ASSESSMENT & PLAN NOTE
- No plans for RRT in near future, per nephrology  - Trialysis line removed 11/13  - Nephrology involved. Appreciate recs.  - Renal fx showing improvement 11/16 w/ Cr 2.5 from 2.8 and  from 102.  - Monitor.

## 2018-11-15 NOTE — PROGRESS NOTES
"SW met with pt and pt's  Dunia in room for update visit for continuity of care following TSU rounds this morning.  Patient found seated in chair at bedside and a&o x 4.  Pt denies any coping issues at this time.  Pt with complaints regarding grogginess during the evening and "rough morning" with scheduling of ultrasound and missing breakfast.  Pt's complaints addressed by rounding transplant surgeon (Dr. Theodore) to pt's satisfaction during SW's visit. Per Dr. Theodore, pt's narcotic medication to be discontinued to address grogginess at night.  SW confirmed with pt that hired caregiver (Bobbi) has been established and is actively engaged as pt's caregiver (as well as pt's 's caregiver) post-transplant.  Dunia reports Bobbi had just left pt's room prior to SW's presenting to room.  Per Dunia, pt did not feel prepared to d/c from hospital this week due to difficulty with performing ADLs and mobility.  Pt plans to work with PT/OT and prepare for d/c next week.  Pt denies having any additional concerns or questions at this time.  SW remains available for further psychosocial support and stated plans of f/u as needed.  "

## 2018-11-15 NOTE — ASSESSMENT & PLAN NOTE
- Valcyte for CMV prophylaxis--> holding (10/31) for leukopenia. Pt will need weekly CMV PCR while discontinued.  - CMV PCR noted to be positive at 129. .  - CMV PCR 11/8: 105  - CMV PCR 11/15 pending.  - Bactrim for PCP prophylaxis ([facility]).--> holding (10/31) for leukopenia.   - Start Atovaquone 11/6.  - Fluconazole for fungal prophylaxis (first dose 10/6). D/c Fluc 11/16 bc pt has been on it for > 30 days post take back.

## 2018-11-15 NOTE — PROGRESS NOTES
[facility]  Liver Transplant  Progress Note    Patient Name: Scarlett Reddy  MRN: [id number]  Admission Date: 10/1/2018  Hospital Length of Stay: 44 days  Code Status: Full Code  Primary Care Provider: Primary Doctor No  Post-Operative Day: 41    ORGAN:   LIVER  Disease Etiology: Cirrhosis: Type B and D  Donor Type:    - Brain Death  CDC High Risk:   No  Donor CMV Status:   Donor CMV Status: Positive  Donor HBcAB:   Negative  Donor HCV Status:   Negative  Donor HBV SETH: Negative  Donor HCV SETH: Negative  Whole or Partial: Whole Liver  Biliary Anastomosis: End to End  Arterial Anatomy: Replaced Left Hepatic and Right Hepatic  Subjective:     History of Present Illness:  Ms. Reddy is a 67 y/o female with PMH of ESLD secondary Hep B and D.  Listed for liver transplant with MELD 23.  Paracentesis 10/1 with 5L removed, no fluid sent for analysis.  Morning labs significant for hyponatremia, Na 119.  Pt admitted to LTS for sodium monitoring.      Hospital Course: 67 y/o F h/o HBV/delta cirrhosis s/p DBDLT 10/5/18 (CMV D+/R+, steroid induction, MMF/tacro/pred maintenance) with take back on 10/6 2/2 hyperbilirubinema and bilious VALORIE drainage, no leak identified. Post-op course c/b hypercapneic and hypoxic respiratory failure and was reintubated though quickly extubated now doing well. Liver bx (10/6) sig for Zone 3 hemorrhage and collapse, in addition to cholestasis. Transferred from ICU on POD #4. ID consulted to comment on positive diphtheroid culture from ascitic fluid (likely skin colonization) as well as ESBL Klebsiella pneumoniae in urine culture (10/4).  Pt asymptomatic and cell count from ascitic fluid unremarkable. Per ID recs, no need to treat diphtheroids or asymptomatic ESBL Kleb pneumo bacteriuria though pt has had 6 days of therapy which would cover these organisms. Mild peritransplant ascites- repeat US 10/6 with increased arterial resistive indices. Repeat US 10/9 w/ continued  "elevation in RIs and fluid collections. LFTs trending down nicely.     Acute gradual worsening of renal function s/p OLTx.Creatinine on arrival 0.8 and has been up trending. Nephrology consulted for post-op EVA. Cr cont to trend up but remains to have good UOP. Had stable response to lasiz diuresis.  BUN elevated requiring multiple rounds of dialysis (10/15, 10/17, 10/20, 10/31). No significant improvements in kidney function noted.   In addition, pt H/H cont to fluctuate requiring multiple blood transfusions (10/14, 10/16, 10/18, 10/19, 10/22, 10/27, 10/29).  Pt reported dark stools 10/15 but color has normalized. FOBT+. EGD 10/18 consistent with ulcerated mucosa in duodenum s/p coagulation. Concern for H. Pylori. Started Amoxicillin/levofloxacin (10/19, complete 11/2)). Remains on Protonix 40 mg BID. Biopsy negative for infection and CMV. Will cont close monitoring H/H, transfuse as needed with goal Hb > 7.0.  Of note, Urine cx + Klebsiella pneumoniae (10/13). Received 3 day course of ciprofloxacin, dc'd 10/19.   On 10/19 pm pt c/o crushing chest pain. Troponin 0.094, likely 2/2 ischemic stress. EKG NSR.   Liver US 11/6 showed no arterial flow.   Paracentesis 11/7 with 8.9 L removed. Wbc 39, segs 16%. Gram stain no organisms seen + no WBC. Pt feels better since having fluid removed but she continues to have worsening abdominal distention & LE edema.Repeat Liver US 11/8 showed arterial flow with increased RIs.Paracentesis 11/12 with 8.6L removed (WBC 34, segs 25%).   11/15 liver u/s showed abnormally decreased main hepatic artery peak systolic velocity with slow arterial flow and abnormal tardus parvus waveforms intrahepatically." Consulted vascular surgery.    Interval history: No acute events overnight. Pt mentioned feeling groggy prior to bed recently after taking meds. D/c'd Remeron and Oxy. Started Tramadol PRN for pain. Renal fx slowly worsening, Cr 2.8, . Pt got 80 mg Lasix TID yesterday. Decreased " "back to Lasix 80 mg BID today. Spoke w/ nephrology, and they agree with this plan. Adequate UOP. LE edema improved from beginning of the week, but pt still has significant fluid retention. Abd distention improved post para on 11/13, but has since returned. Per nephrology, no plans to dialyze in the near future. Poor PO intake, but improving. Wbc improving. Cellcept restarted at 500 mg bid on 11/6. CMV  on 11/1. CMV  11/8. Continue to hold Valcyte. H/H stable.  Liver US 11/15 showed "abnormally decreased main hepatic artery peak systolic velocity with slow arterial flow and abnormal tardus parvus waveforms intrahepatically." No plan for CTA 2/2 poor kidney function. Vascular surgery consulted. Recs pending. Will monitor.    Scheduled Meds:   aspirin  81 mg Oral Daily    atovaquone  1,500 mg Oral Daily    docusate sodium  100 mg Oral Daily    entecavir  0.5 mg Oral Q72H    fluconazole  200 mg Oral Daily    furosemide  80 mg Oral BID    heparin (porcine)  5,000 Units Subcutaneous Q12H    insulin aspart U-100  8-10 Units Subcutaneous TIDWM    magnesium oxide  800 mg Oral BID    midodrine  15 mg Oral TID    mycophenolate  500 mg Oral BID    pantoprazole  40 mg Oral BID    polyethylene glycol  17 g Oral Daily    predniSONE  15 mg Oral Daily    Followed by    [START ON 11/17/2018] predniSONE  10 mg Oral Daily    Followed by    [START ON 11/24/2018] predniSONE  5 mg Oral Daily    Followed by    [START ON 12/1/2018] predniSONE  2.5 mg Oral Daily    tacrolimus  0.5 mg Oral BID    ursodiol  300 mg Oral BID     Continuous Infusions:  PRN Meds:acetaminophen, bisacodyl, bisacodyl, calcium carbonate, dextrose 50%, dextrose 50%, glucagon (human recombinant), glucose, glucose, insulin aspart U-100, ondansetron, ondansetron, polyethylene glycol, sodium chloride 0.9%, traMADol    Review of Systems   Constitutional: Positive for activity change, appetite change and fatigue. Negative for chills and " fever.   HENT: Negative for congestion, facial swelling and trouble swallowing.    Eyes: Negative for pain, discharge and visual disturbance.   Respiratory: Negative for cough, chest tightness, shortness of breath and wheezing.    Cardiovascular: Positive for leg swelling. Negative for chest pain and palpitations.   Gastrointestinal: Positive for abdominal distention and abdominal pain. Negative for blood in stool, constipation, diarrhea, nausea and vomiting.   Endocrine: Negative.    Genitourinary: Positive for decreased urine volume. Negative for difficulty urinating and dysuria.   Musculoskeletal: Negative for arthralgias, back pain, neck pain and neck stiffness.   Skin: Positive for wound.   Allergic/Immunologic: Positive for immunocompromised state.   Neurological: Positive for weakness. Negative for tremors and headaches.   Psychiatric/Behavioral: Negative for confusion and sleep disturbance.     Objective:     Vital Signs (Most Recent):  Temp: 97.5 °F (36.4 °C) (11/15/18 1133)  Pulse: (!) 112 (11/15/18 1109)  Resp: 14 (11/15/18 1109)  BP: 122/70 (11/15/18 1109)  SpO2: 98 % (11/15/18 1109) Vital Signs (24h Range):  Temp:  [97.4 °F (36.3 °C)-97.6 °F (36.4 °C)] 97.5 °F (36.4 °C)  Pulse:  [] 112  Resp:  [9-17] 14  SpO2:  [95 %-100 %] 98 %  BP: ()/(58-89) 122/70     Weight: 70.8 kg (156 lb 1.4 oz)  Body mass index is 27.65 kg/m².    Intake/Output - Last 3 Shifts       11/13 0700 - 11/14 0659 11/14 0700 - 11/15 0659 11/15 0700 - 11/16 0659    P.O. 1130 540     Total Intake(mL/kg) 1130 (16) 540 (7.6)     Urine (mL/kg/hr) 1750 (1) 1300 (0.8) 475 (1.1)    Other       Stool 0 0     Total Output 1750 1300 475    Net -620 -760 -475           Stool Occurrence 2 x 0 x           Physical Exam   Constitutional: She is oriented to person, place, and time. She appears well-developed. No distress.   Temporal and distal extremity muscle wasting   HENT:   Head: Normocephalic and atraumatic.   Eyes: EOM are normal. No  scleral icterus.   Neck: Normal range of motion.   Cardiovascular: Normal rate and regular rhythm.   No murmur heard.  Pulmonary/Chest: Effort normal. No respiratory distress. She has no wheezes.   Decreased at bases   Abdominal: Bowel sounds are normal. She exhibits distension. There is tenderness. There is no guarding.   Chevron incision w/ steri strips, minimal serous leakage from R side   RLQ VALORIE drain sites c/d/i with sutures   Musculoskeletal: Normal range of motion. She exhibits edema (2+ BLE, + generalized).   Neurological: She is alert and oriented to person, place, and time.   Skin: Skin is warm. She is not diaphoretic.   Psychiatric: She has a normal mood and affect.   Nursing note and vitals reviewed.      Laboratory:  Immunosuppressants         Stop Route Frequency     tacrolimus capsule 0.5 mg      -- Oral 2 times daily     mycophenolate capsule 500 mg      -- Oral 2 times daily        CBC:   Recent Labs   Lab 11/15/18  0846   WBC 5.22   RBC 3.22*   HGB 9.4*   HCT 29.8*   *   MCV 93   MCH 29.2   MCHC 31.5*     CMP:   Recent Labs   Lab 11/15/18  0845   *   CALCIUM 8.7   ALBUMIN 3.1*   PROT 4.9*   *   K 4.9   CO2 24   CL 94*   *   CREATININE 2.8*   ALKPHOS 133   ALT 22   AST 20   BILITOT 1.5*     Labs within the past 24 hours have been reviewed.    Diagnostic Results:  I have personally reviewed all pertinent imaging studies.    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   - POD 1 US: increased arterial resistive indices, suggestive of edema vs acute rejection vs congestion.  - Repeat US 10/9 with continued elevation of RIs.  - LFTs stable.  - T bili trending down.   - Repeat liver US (10/11): elevated RIs.  - Liver US 11/6 to assess for ascites. No arterial flow within the liver allograft concerning for  "interval thrombosis/occlusion of the arterial system. Severe ascites seen.  - Liver US 11/8: arterial flow seen w/ elevated RIs   - LFTs remain stable. No CTA at this time due to EVA and normal liver function.   - Liver US 11/15 showed "abnormally decreased main hepatic artery peak systolic velocity with slow arterial flow and abnormal tardus parvus waveforms intrahepatically." Vascular surgery consulted. Recs pending.         Other drug-induced pancytopenia    - Decreased plt, wbc, h/h  - See "leukopenia"       Thrombocytopenia, unspecified    - Improving.  - Monitor.       Alteration in skin integrity    Monitor.       Leukopenia    - Stopped Cellcept, Bactrim, and Valcyte 10/31 2/2 leukopenia  - Started Atovaquone 11/6.   - CMV PCR noted to be positive at 129. Will hold off on treatment with valcyte at this time.  - WBC improving   - Continue to monitor.       Azotemia    - CRRT 10/31 for uremic toxin clearance.           UTI (urinary tract infection)    - Urine Cx 10/15 w/ Klebsiella pneumonia.  - Started Ciprofloxacin (10/17-10/19).   - Started on amoxicillin/levo for H. Pylori (completed abx 11/2).  - Pt asymptomatic at this time.       Acute blood loss anemia    - H&H cont to fluctuate.   - FOBT +.  - EGD 10/18 - showed diffuse bleeding with ulcerated duodenal mucosa.  - EGD 11/5 - improvement seen, no active bleeding.  - 1 unit PRBCs transfused 10/27 and 11/3.   - H&H with good response.   - Resume heparin sq injections 11/6.   - Decrease heparin sq from TID to BID 11/15 due to increased bruising (& pt ambulating more often).  - See "anemia requiring transfusions."  - Continue to monitor.             Post LT - Acute renal failure    - Creatinine on arrival 0.8 and trended up.  - Nephrology consulted.  - Renal US (10/11): no hydronephrosis.  - Echo 10/12: diastolic dysfunction, likely 2/2 to ARF.  - CRRT 10/14; SLED 10/17, 10/20. --> no improvements noted in kidney fxn.  - 24 hr urine creatine collection: " "CrCl of 7.   - CRRT 10/31 with 1L removed.   - See "hypervolemia associated with renal insufficiency"     Hypervolemia associated with renal insufficiency    - CRRT 10/14, 10/17, 10/20, 10/31.  - CXR 10/30: abundant fluid in b/l pleural space.  - Restarted Lasix 40 BID x 2 doses (11/2).  - Lasix held and then restarted 11/9 at 40 mg BID per nephrology recs.  - Pt not showing improvement in fluid status.   - Paracentesis 11/12: 8.6L removed   - Pt given 80 mg Lasix TID 11/14. Decrease back to 80 mg Lasix BID 11/15 to preserve renal fx.         EVA (acute kidney injury)    - 11/11: Renal fx not improving. Cr 2.7, .  - No plans for RRT in near future, per nephrology  - Trialysis line removed 11/13  - Nephrology involved. Appreciate recs.  - Monitor.     Metabolic acidosis    - CRRT 10/14.  - SLED 10/17, 10/20, 10/31.         At risk for opportunistic infections    - Valcyte for CMV prophylaxis--> holding (10/31) for leukopenia. Pt will need weekly CMV PCR while discontinued.  - CMV PCR noted to be positive at 129. .  - CMV PCR 11/8: 105  - CMV PCR 11/15 pending.  - Bactrim for PCP prophylaxis (MWF).--> holding (10/31) for leukopenia.   - Start Atovaquone 11/6.  - Fluconazole for fungal prophylaxis.         Long-term use of immunosuppressant medication    - Prograf: stopped 10/29. Resumed 11/2.  --> Will monitor for signs of toxic side effects, check daily troughs, and change meds accordingly.  - Prednisone: stopped 10/29. Started on Hydrocortisone. pred taper resumed.  - Cellcept: stopped 10/31 for leukopenia. Restarted Cellcept 500 mg bid, 11/6.         Prophylactic immunotherapy    - See long term use immunosuppresion.       Ascites    - Paracentesis 10/1, 5L removed, no fluid sent for analysis.  - Pt remains volume overloaded.   - Liver US 11/6 with severe ascites.   - Paracentesis 11/7 with 8.9 L removed. Wbc 39, segs 16%. Gram stain - no organisms seen and no WBC. Cultures NGTD.  - Paracentesis 11/12: 8.6 L " removed. 34 wbc, 25% segs, 33 lymphs, 42 monocytes/macrophages. Cultures NGTD     Weakness    - See physical deconditioning.       Physical deconditioning    - PT/OT consulted.  - Recommend home health PT and rolling walker at discharge (orders placed 11/6).       Hyponatremia    - Na 119 on admit.  - Na was improving post transplant w HD.  - Na stable at this time.              Hypotension    - continue Midodrine 15 mg TID. Monitor       Anemia requiring transfusions    - Fluctuating H&H.    - Transfusions: 10/14; 10/16; 10/18; 10/19; 10/22; 10/27; 10/29, 10/31  - FOBT +  - LDH elevated, Hapto < 10.  - EGD 10/18: ulcerated duodenal mucosa.   - Consulted GI about this ongoing issue and further need of another EGD.  - EGD 11/5 unremarkable.   - H/H decreased again 11/13: 7.2/22.6 from 8.5/26.5  - Repeat H/H stable  - Iron studies 11/13:  95, TIBC 107, sat iron 89, transferrin 72, ferritin pending  - Hemolytic anemia workup 11/13: Haptoglobin 132, retic 3.9   - H & H stable, no need for transfusion at this time  - Will continue to monitor closely.           Severe malnutrition    - Dietary consulted.   - Temporal and distal extremity muscle wasting and edema.  - Encourage supplements and to increase nutritional intake.  - Per nephrology, pt to be on low protein diet.   - Prealbumin reviewed.      Type 2 diabetes mellitus with diabetic polyneuropathy, with long-term current use of insulin    - Continue home regimen.  - Endocrine consulted.  - Pt off insulin drip at this time.  Apprec endo recs.        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  - Tx w/ Entecavir- dose changed to q72 hours given renal function.             VTE Risk Mitigation (From admission, onward)        Ordered     heparin (porcine) injection 5,000 Units  Every 12 hours      11/15/18 1131     IP VTE HIGH RISK PATIENT  Once      11/05/18 1145     Place sequential compression device   Until discontinued      10/05/18 0709          The patients clinical status was discussed at multidisplinary rounds, involving transplant surgery, transplant medicine, pharmacy, nursing, nutrition, and social work    Discharge Planning:  No Patient Care Coordination Note on file.      Elizabeth Dean PA-C  Liver Transplant  [facility]

## 2018-11-15 NOTE — ASSESSMENT & PLAN NOTE
- CRRT 10/14, 10/17, 10/20, 10/31.  - CXR 10/30: abundant fluid in b/l pleural space.  - Restarted Lasix 40 BID x 2 doses (11/2).  - Lasix held and then restarted 11/9 at 40 mg BID per nephrology recs.  - Pt not showing improvement in fluid status.   - Paracentesis 11/12: 8.6L removed   - Pt given 80 mg Lasix TID 11/14. Decrease back to 80 mg Lasix BID 11/15 to preserve renal fx.  - Continue 80 mg Lasix BID.  - Monitor daily weights.

## 2018-11-15 NOTE — SUBJECTIVE & OBJECTIVE
Scheduled Meds:   aspirin  81 mg Oral Daily    atovaquone  1,500 mg Oral Daily    docusate sodium  100 mg Oral Daily    entecavir  0.5 mg Oral Q72H    fluconazole  200 mg Oral Daily    furosemide  80 mg Oral BID    heparin (porcine)  5,000 Units Subcutaneous Q12H    insulin aspart U-100  8-10 Units Subcutaneous TIDWM    magnesium oxide  800 mg Oral BID    midodrine  15 mg Oral TID    mycophenolate  500 mg Oral BID    pantoprazole  40 mg Oral BID    polyethylene glycol  17 g Oral Daily    predniSONE  15 mg Oral Daily    Followed by    [START ON 11/17/2018] predniSONE  10 mg Oral Daily    Followed by    [START ON 11/24/2018] predniSONE  5 mg Oral Daily    Followed by    [START ON 12/1/2018] predniSONE  2.5 mg Oral Daily    tacrolimus  0.5 mg Oral BID    ursodiol  300 mg Oral BID     Continuous Infusions:  PRN Meds:acetaminophen, bisacodyl, bisacodyl, calcium carbonate, dextrose 50%, dextrose 50%, glucagon (human recombinant), glucose, glucose, insulin aspart U-100, ondansetron, ondansetron, polyethylene glycol, sodium chloride 0.9%, traMADol    Review of Systems   Constitutional: Positive for activity change, appetite change and fatigue. Negative for chills and fever.   HENT: Negative for congestion, facial swelling and trouble swallowing.    Eyes: Negative for pain, discharge and visual disturbance.   Respiratory: Negative for cough, chest tightness, shortness of breath and wheezing.    Cardiovascular: Positive for leg swelling. Negative for chest pain and palpitations.   Gastrointestinal: Positive for abdominal distention, abdominal pain and constipation. Negative for blood in stool, diarrhea, nausea and vomiting.   Endocrine: Negative.    Genitourinary: Positive for decreased urine volume. Negative for difficulty urinating and dysuria.   Musculoskeletal: Negative for arthralgias, back pain, neck pain and neck stiffness.   Skin: Positive for wound.   Allergic/Immunologic: Positive for  immunocompromised state.   Neurological: Positive for weakness. Negative for tremors and headaches.   Psychiatric/Behavioral: Negative for confusion and sleep disturbance.     Objective:     Vital Signs (Most Recent):  Temp: 97.5 °F (36.4 °C) (11/15/18 1133)  Pulse: (!) 112 (11/15/18 1109)  Resp: 14 (11/15/18 1109)  BP: 122/70 (11/15/18 1109)  SpO2: 98 % (11/15/18 1109) Vital Signs (24h Range):  Temp:  [97.4 °F (36.3 °C)-97.6 °F (36.4 °C)] 97.5 °F (36.4 °C)  Pulse:  [] 112  Resp:  [9-17] 14  SpO2:  [95 %-100 %] 98 %  BP: ()/(58-89) 122/70     Weight: 70.8 kg (156 lb 1.4 oz)  Body mass index is 27.65 kg/m².    Intake/Output - Last 3 Shifts       11/13 0700 - 11/14 0659 11/14 0700 - 11/15 0659 11/15 0700 - 11/16 0659    P.O. 1130 540     Total Intake(mL/kg) 1130 (16) 540 (7.6)     Urine (mL/kg/hr) 1750 (1) 1300 (0.8) 475 (1.1)    Other       Stool 0 0     Total Output 1750 1300 475    Net -620 -760 -475           Stool Occurrence 2 x 0 x           Physical Exam   Constitutional: She is oriented to person, place, and time. She appears well-developed. No distress.   Temporal and distal extremity muscle wasting   HENT:   Head: Normocephalic and atraumatic.   Eyes: EOM are normal. No scleral icterus.   Neck: Normal range of motion.   Cardiovascular: Normal rate and regular rhythm.   No murmur heard.  Pulmonary/Chest: Effort normal. No respiratory distress. She has no wheezes.   Decreased at bases   Abdominal: Bowel sounds are normal. She exhibits distension. There is tenderness. There is no guarding.   Chevron incision w/ steri strips, minimal serous leakage from R side   RLQ VALORIE drain sites c/d/i with sutures   Musculoskeletal: Normal range of motion. She exhibits edema (2+ BLE, + generalized).   Neurological: She is alert and oriented to person, place, and time.   Skin: Skin is warm. She is not diaphoretic.   Psychiatric: She has a normal mood and affect.   Nursing note and vitals  reviewed.      Laboratory:  Immunosuppressants         Stop Route Frequency     tacrolimus capsule 0.5 mg      -- Oral 2 times daily     mycophenolate capsule 500 mg      -- Oral 2 times daily        CBC:   Recent Labs   Lab 11/15/18  0846   WBC 5.22   RBC 3.22*   HGB 9.4*   HCT 29.8*   *   MCV 93   MCH 29.2   MCHC 31.5*     CMP:   Recent Labs   Lab 11/15/18  0845   *   CALCIUM 8.7   ALBUMIN 3.1*   PROT 4.9*   *   K 4.9   CO2 24   CL 94*   *   CREATININE 2.8*   ALKPHOS 133   ALT 22   AST 20   BILITOT 1.5*     Labs within the past 24 hours have been reviewed.    Diagnostic Results:  I have personally reviewed all pertinent imaging studies.

## 2018-11-15 NOTE — PROGRESS NOTES
" [facility]  Adult Nutrition  Progress Note     SUMMARY       Recommendations  Recommendation/Intervention:   1. Continue diet order as tolerated.   2. Encouraged good po intake as tolerated.   3. Dietitian Following    Goals:   1. Patient to meet >85% EEN/EPN.   2. Promote nutrition related labs WNL  Nutrition Goal Status: progressing towards goal  Communication of RD Recs: (POC)    General Information Comments: Continued increased intake. Noted 11/12/18 paracentesis 8.6L Removed. Noted patient down 10lbs since last Dietitian visit. Boost Plus d/c. Consumes homemade protein supplements. NFPE completed 10/3/18.    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, DM  Interdisciplinary Rounds: attended    Nutrition Risk Screen  Nutrition Risk Screen: cultural or Worship food preferences    Nutrition/Diet History  Patient Reported Diet/Restrictions/Preferences: diabetic diet, low salt(Cultural preferences)  Food Preferences: Cultural preferences Noted  Do you have any cultural, spiritual, Worship conflicts, given your current situation?: none noted   Supplemental Drinks or Food Habits: Boost Plus, Ensure Plus(Strawberry)  Food Allergies: NKFA  Factors Affecting Nutritional Intake: abdominal distention    Anthropometrics  Temp: 97.6 °F (36.4 °C)  Height Method: Stated  Height: 5' 3" (160 cm)  Height (inches): 63 in  Weight Method: Bed Scale  Weight: 70.8 kg (156 lb 1.4 oz)  Weight (lb): 156.09 lb  Ideal Body Weight (IBW), Female: 115 lb  % Ideal Body Weight, Female (lb): 101.8 lb  BMI (Calculated): 20.8  BMI Grade: 18.5-24.9 - normal    Lab/Procedures/Meds  Labs: Reviewed    (L) 11/15/2018    K 4.9 11/15/2018     (H) 11/15/2018    CREATININE 2.8 (H) 11/15/2018    CALCIUM 8.7 " 11/15/2018    PHOS 3.3 11/15/2018    MG 2.2 11/15/2018    EGFRNONAA 16.6 (A) 11/15/2018    ALBUMIN 3.1 (L) 11/15/2018      ALT 22 11/15/2018    AST 20 11/15/2018    ALKPHOS 133 11/15/2018     Meds: Reviewed  Scheduled Meds:   aspirin  81 mg Oral Daily    atovaquone  1,500 mg Oral Daily    docusate sodium  100 mg Oral Daily    entecavir  0.5 mg Oral Q72H    fluconazole  200 mg Oral Daily    furosemide  80 mg Oral TID    heparin (porcine)  5,000 Units Subcutaneous Q8H    insulin aspart U-100  8-10 Units Subcutaneous TIDWM    magnesium oxide  800 mg Oral BID    midodrine  15 mg Oral TID    mycophenolate  500 mg Oral BID    pantoprazole  40 mg Oral BID    polyethylene glycol  17 g Oral Daily    predniSONE  15 mg Oral Daily     Physical Findings/Assessment  Overall Physical Appearance: loss of subcutaneous fat, loss of muscle mass  Oral/Mouth Cavity: tooth/teeth missing  Skin: edema, intact, skin tear(Skin Tear Lower Abdomen)    Estimated/Assessed Needs  Weight Used For Calorie Calculations: 72.2 kg (159 lb 2.8 oz)  Energy Calorie Requirements (kcal): 5229-0367  Energy Need Method: Kcal/kg(25-30 kcal/kg)  Protein Requirements: (1.2-1.4 gm/kg)  Weight Used For Protein Calculations: 72.2 kg (159 lb 2.8 oz)  Fluid Requirements (mL): 1 mL/kcal or per MD  Fluid Need Method: RDA Method  RDA Method (mL): 1805     Nutrition Prescription Ordered  Current Diet Order: Regular, Low protein   Nutrition Order Comments: Fluid - 1500mL    Evaluation of Received Nutrient/Fluid Intake in last 24h  I/O: -8.6L since 10/31/18  Comments: LBM 11/13/18  % Intake of Estimated Energy Needs: 50 - 75 %  % Meal Intake: 50 - 75 %    Nutrition Risk  Level of Risk/Frequency of Follow-up: low(1x week)     Assessment and Plan  NFPE completed 10/3/18  Severe malnutrition   Nutrition Problem  Severe Malnutrition in the context of Chronic Illness/Injury     Related to (etiology):  ESLD     Signs and Symptoms (as evidenced by):  Energy  Intake: <75% of estimated energy requirement for 2 months  Body Fat Depletion: Severe depletion of orbitals and triceps   Muscle Mass Depletion: Severe depletion of temples, clavicle region and lower extremities   Weight Loss: 12.31% x 1 month     Nutrition Diagnosis Status:  Continues     Monitor and Evaluation  Food and Nutrient Intake: energy intake, food and beverage intake  Food and Nutrient Adminstration: diet order  Knowledge/Beliefs/Attitudes: food and nutrition knowledge/skill  Physical Activity and Function: nutrition-related ADLs and IADLs  Anthropometric Measurements: weight change, weight  Biochemical Data, Medical Tests and Procedures: electrolyte and renal panel, gastrointestinal profile, glucose/endocrine profile, inflammatory profile, lipid profile  Nutrition-Focused Physical Findings: overall appearance     Nutrition Follow-Up  RD Follow-up?: Yes

## 2018-11-15 NOTE — ASSESSMENT & PLAN NOTE
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     Plan:  - No indication for CRRT; HD line has been removed  - Patient worsening azotemia likely secondary to her H/H dropping from GI bleed last week.   - No uremic symptoms or signs.   - Patient still with significant pitting LE edema and evidence of fluid overload, but has had significant improvement in past week.   - Patients creatinine has been slowly increasing (~0.1 daily) for last several days; creatinine today at 2.8.  - Furosemide was increased by transplant liver team to TID yesterday; would recommend going back to 80 mg PO BID dose.  - Total UOP 24 hours 1300 ml with net -760 ml.

## 2018-11-15 NOTE — ASSESSMENT & PLAN NOTE
"- H&H cont to fluctuate.   - FOBT +.  - EGD 10/18 - showed diffuse bleeding with ulcerated duodenal mucosa.  - EGD 11/5 - improvement seen, no active bleeding.  - 1 unit PRBCs transfused 10/27 and 11/3.   - H&H with good response.   - Resume heparin sq injections 11/6.   - Decrease heparin sq from TID to BID 11/15   - If pt ambulates 2-3 times 11/16, ok to d/c heparin (2/2 c/o increased bruising and prolonged bleeding)  - See "anemia requiring transfusions."  - Continue to monitor.         "

## 2018-11-15 NOTE — PLAN OF CARE
"Patient has repeat transplant liver ultrasound with "findings concerning for developing hepatic artery stenosis/rejection.  Recommend continued close follow-up with consideration of possible contrast enhanced triple-phase abdominal CT for further evaluation if clinically indicated."     Primary transplant liver team has discussed case with vascular surgery who has recommended contrasted abdominal CT for further evaluation. We have spoken with primary transplant liver team of the very high risks of inducing contrast-induced nephropathy in this patient with worsening renal function. If CT is necessary, recommend holding diuretics for 24 hours prior and after study as well as holding giving IVF as patient is currently significantly fluid overloaded. Can consider providing oral N-acetylcysteine (NAC) as prophylaxis prior to study as it has shown limited efficacy in reducing rates of LOKESH. In order to preserve renal function, nephrology team would favor angiogram > triple phase CT due to significantly less contrast load and ability to intervene in real time with positive findings.     Case was discussed with staff attending Dr. Melissa.    dEward Dye MD  PGY-3 Internal Medicine  [phone]    "

## 2018-11-15 NOTE — PROGRESS NOTES
[facility]  Nephrology  Progress Note    Patient Name: Scarlett Reddy  MRN: [id number]  Admission Date: 10/1/2018  Hospital Length of Stay: 44 days  Attending Provider: Jani Theodore MD   Primary Care Physician: Primary Doctor No  Principal Problem:Liver transplanted    Subjective:     HPI: 67 y/o F h/o HBV/delta cirrhosis s/p DBDLT 10/5/18 (CMV D+/R+, steroid induction, MMF/tacro/pred maintenance) with take back on 10/6 2/2 hyperbilirubinema and bilious VALORIE drainage, no leak identified. Post-op course c/b hypercapneic and hypoxic respiratory failure and was reintubated though quickly extubated now doing well. Liver bx (10/6) sig for Zone 3 hemorrhage and collapse, in addition to cholestasis. Transferred from ICU on POD #4. ID consulted to comment on positive diphtheroid culture from ascitic fluid (likely skin colonization) as well as ESBL Klebsiella pneumoniae in urine culture (10/4).  Pt asymptomatic and cell count from ascitic fluid unremarkable. Per ID recs, no need to treat diphtheroids or asymptomatic ESBL Kleb pneumo bacteriuria though pt has had 6 days of therapy which would cover these organisms. Mild peritransplant ascites- repeat US 10/6 with increased arterial resistive indices. Repeat US 10/9 w/ continued elevation in RIs and fluid collections. LFTs trending down nicely.      Acute gradual worsening of renal function s/p OLTx.Creatinine on arrival 0.8 and has been up trending. Nephrology consulted for post-op EVA. Cr cont to trend up but remains to have good UOP. Had stable response to lasiz diuresis.  BUN elevated requiring multiple rounds of dialysis (10/15, 10/17, 10/20, 10/31). No significant improvements in kidney function noted.   In addition, pt H/H cont to fluctuate requiring multiple blood transfusions (10/14, 10/16, 10/18, 10/19, 10/22, 10/27, 10/29).  Pt reported dark stools 10/15 but color has normalized. FOBT+. EGD 10/18 consistent with ulcerated mucosa in duodenum s/p  coagulation. Concern for H. Pylori. Started Amoxicillin/levofloxacin (10/19, complete 11/2)). Remains on Protonix 40 mg BID. Biopsy negative for infection and CMV. Will cont close monitoring H/H, transfuse as needed with goal Hb > 7.0.  Of note, Urine cx + Klebsiella pneumoniae (10/13). Received 3 day course of ciprofloxacin, dc'd 10/19.   On 10/19 pm pt c/o crushing chest pain. Troponin 0.094, likely 2/2 ischemic stress. EKG NSR.      11/5/18 - no acute events overnight. Pt reports feeling well this am. One unit of PRBCs given 11/3 with good response. Cr level noted with only minimal increases. Lasix placed on hold per nephrology recs. Will plan to hydrate today w/ albumin. EGD 11/5 unimpressive for overt bleed. Pt remains on protonix 40 BID. Denies melena. Cont to hold heparin. WBC cont to decrease. Immunosuppression managed with hydrocortisone 50 (cont to hold Cellcept, bactrim, valcyte). Prograf now restarted and transitioned IV steroids to PO prednisone on 11/3. CMV PCR noted with 129. Cont to monitor.     Interval History:   Stressed this morning and in tears. States lower extremity edema is improving. Patient complains that previous paracentesis sites have continued to leak.     Review of patient's allergies indicates:  No Known Allergies  Current Facility-Administered Medications   Medication Frequency    acetaminophen tablet 650 mg Q6H PRN    aspirin chewable tablet 81 mg Daily    atovaquone suspension 1,500 mg Daily    bisacodyl EC tablet 10 mg Daily PRN    bisacodyl suppository 10 mg Daily PRN    calcium carbonate 200 mg calcium (500 mg) chewable tablet 500 mg TID PRN    dextrose 50% injection 12.5 g PRN    dextrose 50% injection 25 g PRN    docusate sodium capsule 100 mg Daily    entecavir tablet 0.5 mg Q72H    fluconazole tablet 200 mg Daily    furosemide tablet 80 mg BID    glucagon (human recombinant) injection 1 mg PRN    glucose chewable tablet 16 g PRN    glucose chewable tablet 24 g  PRN    heparin (porcine) injection 5,000 Units Q12H    insulin aspart U-100 pen 0-5 Units QID (AC + HS) PRN    insulin aspart U-100 pen 8-10 Units TIDWM    magnesium oxide tablet 800 mg BID    midodrine tablet 15 mg TID    mycophenolate capsule 500 mg BID    ondansetron disintegrating tablet 8 mg Q8H PRN    ondansetron injection 4 mg Q8H PRN    pantoprazole EC tablet 40 mg BID    polyethylene glycol packet 17 g Daily PRN    polyethylene glycol packet 17 g Daily    predniSONE tablet 15 mg Daily    Followed by    [START ON 11/17/2018] predniSONE tablet 10 mg Daily    Followed by    [START ON 11/24/2018] predniSONE tablet 5 mg Daily    Followed by    [START ON 12/1/2018] predniSONE tablet 2.5 mg Daily    sodium chloride 0.9% flush 3 mL PRN    tacrolimus capsule 0.5 mg BID    traMADol tablet 50 mg Q6H PRN    ursodiol capsule 300 mg BID       Objective:     Vital Signs (Most Recent):  Temp: 97.5 °F (36.4 °C) (11/15/18 1133)  Pulse: (!) 112 (11/15/18 1109)  Resp: 14 (11/15/18 1109)  BP: 122/70 (11/15/18 1109)  SpO2: 98 % (11/15/18 1109)  O2 Device (Oxygen Therapy): room air (11/14/18 2000) Vital Signs (24h Range):  Temp:  [97.4 °F (36.3 °C)-97.6 °F (36.4 °C)] 97.5 °F (36.4 °C)  Pulse:  [] 112  Resp:  [9-17] 14  SpO2:  [97 %-100 %] 98 %  BP: ()/(58-89) 122/70     Weight: 70.8 kg (156 lb 1.4 oz) (11/12/18 0530)  Body mass index is 27.65 kg/m².  Body surface area is 1.77 meters squared.    I/O last 3 completed shifts:  In: 960 [P.O.:960]  Out: 2300 [Urine:2300]    Physical Exam   Constitutional: She is oriented to person, place, and time. She appears well-developed. No distress.   Temporal and distal extremity muscle wasting   HENT:   Head: Normocephalic and atraumatic.   Eyes: EOM are normal. No scleral icterus.   Neck: Normal range of motion.   Cardiovascular: Normal rate and regular rhythm.   No murmur heard.  Pulmonary/Chest: Effort normal. No respiratory distress. She has no wheezes.    Abdominal: Bowel sounds are normal. She exhibits distension (improved from yesterday). There is tenderness. There is no guarding.   Chevron incision w/ steri strips, minimal serous leakage from R side   RLQ VALORIE drain sites c/d/i with sutures   Musculoskeletal: Normal range of motion. She exhibits edema (2+ BLE, + generalized).   Neurological: She is alert and oriented to person, place, and time.   Skin: Skin is warm. She is not diaphoretic.   Psychiatric: She has a normal mood and affect.   Nursing note and vitals reviewed.      Significant Labs:  CMP:   Recent Labs   Lab 11/15/18  0845   *   CALCIUM 8.7   ALBUMIN 3.1*   PROT 4.9*   *   K 4.9   CO2 24   CL 94*   *   CREATININE 2.8*   ALKPHOS 133   ALT 22   AST 20   BILITOT 1.5*     All labs within the past 24 hours have been reviewed.       Assessment/Plan:      Post LT - Acute renal failure    Post-LT EVA is multifactorial in origin and has been related to the severity of liver disease, toxicity of immunosuppressive therapy and hepatic ischaemia reperfusion injury might be a driving force in the aetiology of post-LT EVA.     Plan:  - No indication for CRRT; HD line has been removed  - Patient worsening azotemia likely secondary to her H/H dropping from GI bleed last week.   - No uremic symptoms or signs.   - Patient still with significant pitting LE edema and evidence of fluid overload, but has had significant improvement in past week.   - Patients creatinine has been slowly increasing (~0.1 daily) for last several days; creatinine today at 2.8.  - Furosemide was increased by transplant liver team to TID yesterday; would recommend going back to 80 mg PO BID dose.  - Total UOP 24 hours 1300 ml with net -760 ml.         Thank you for your consult. I will follow-up with patient. Please contact us if you have any additional questions. Case was discussed with staff attending Dr. Melissa; attestation to follow.    Edward Dye MD  PGY-3 Internal  Medicine  [phone]    Nephrology  [facility]

## 2018-11-15 NOTE — PT/OT/SLP PROGRESS
Physical Therapy      Patient Name:  Scarlett Reddy   MRN:  [id number]    Patient not seen today secondary to Patient unwilling to participate, Patient fatigue(Attempted this PM but pt declined 2* fatigue). Encouragement provided but pt continued to decline PT this date. Noted fatigue and malaise compared to typical presentation; RN notified. Will follow-up at next scheduled session as able.    Radha Nguyen, PT, DPT   11/15/2018  [phone]

## 2018-11-15 NOTE — PLAN OF CARE
Problem: Patient Care Overview  Goal: Plan of Care Review  Outcome: Ongoing (interventions implemented as appropriate)  Pt is AAOx4, 1 person assist with walker, and VSS. Pt denies pain.  confirmed pt verbalized understanding of blue card. Liver US obtained. Blood glucose monitoring performed and treated as ordered. Pt is up in chair, call bell within reach, VISI monitoring in place, pt's family at bedside, and nonskid socks on.

## 2018-11-15 NOTE — PT/OT/SLP PROGRESS
"Occupational Therapy      Patient Name:  Scarlett Reddy   MRN:  [id number]    Occupational therapy attempted visit in PM this date. Patient not seen today secondary to Patient unwilling to participate. Pt politely declined 2/2 " not feeling well". Provided educated on the importance of OOB activity and participation with therapy. Will follow-up as scheduled.    Laura corado OT  11/15/2018  "

## 2018-11-15 NOTE — PLAN OF CARE
Problem: Patient Care Overview  Goal: Plan of Care Review  Outcome: Ongoing (interventions implemented as appropriate)    Pt AAOx4 with  at the bedside.  R UA 22g saline locked.  Up with walker and standby assist.   Pt scheduled for liver US today for f/u elevated RIs last week.  VSS. PM dose of scheduled midodrine held for SBP > 120.  Accuchecks being monitored AC/HS with bedtime sugar of 147  requiring  no coverage.  Pt has voided 700 mL in urine thus far. No BM.  Chevron incision JULIANE with steri strips. Small amount of serous leaking noted from R side. ABD dressing applied to both sides of incision per pt request.   Contact iso for ESBL in urine maintained.   Self meds not pulled.   Pt remained free from falls or injury thus far. Bed is in low/ locked position, side rails are up x 2, call light is in reach.   Will continue to monitor.

## 2018-11-15 NOTE — ASSESSMENT & PLAN NOTE
- Paracentesis 10/1, 5L removed, no fluid sent for analysis.  - Pt remains volume overloaded.   - Liver US 11/6 with severe ascites.   - Paracentesis 11/7 with 8.9 L removed. Wbc 39, segs 16%. Gram stain - no organisms seen and no WBC. Cultures NGTD.  - Paracentesis 11/12: 8.6 L removed. 34 wbc, 25% segs, 33 lymphs, 42 monocytes/macrophages. Cultures NGTD

## 2018-11-15 NOTE — ASSESSMENT & PLAN NOTE
- Fluctuating H&H.    - Transfusions: 10/14; 10/16; 10/18; 10/19; 10/22; 10/27; 10/29, 10/31  - FOBT +  - LDH elevated, Hapto < 10.  - EGD 10/18: ulcerated duodenal mucosa.   - Consulted GI about this ongoing issue and further need of another EGD.  - EGD 11/5 unremarkable.   - H/H decreased again 11/13: 7.2/22.6 from 8.5/26.5  - Repeat H/H stable  - Iron studies 11/13:  95, TIBC 107, sat iron 89, transferrin 72, ferritin pending  - Hemolytic anemia workup 11/13: Haptoglobin 132, retic 3.9   - H & H stable, no need for transfusion at this time  - Will continue to monitor closely.

## 2018-11-15 NOTE — SUBJECTIVE & OBJECTIVE
Interval History:   Stressed this morning and in tears. States lower extremity edema is improving. Patient complains that previous paracentesis sites have continued to leak.     Review of patient's allergies indicates:  No Known Allergies  Current Facility-Administered Medications   Medication Frequency    acetaminophen tablet 650 mg Q6H PRN    aspirin chewable tablet 81 mg Daily    atovaquone suspension 1,500 mg Daily    bisacodyl EC tablet 10 mg Daily PRN    bisacodyl suppository 10 mg Daily PRN    calcium carbonate 200 mg calcium (500 mg) chewable tablet 500 mg TID PRN    dextrose 50% injection 12.5 g PRN    dextrose 50% injection 25 g PRN    docusate sodium capsule 100 mg Daily    entecavir tablet 0.5 mg Q72H    fluconazole tablet 200 mg Daily    furosemide tablet 80 mg BID    glucagon (human recombinant) injection 1 mg PRN    glucose chewable tablet 16 g PRN    glucose chewable tablet 24 g PRN    heparin (porcine) injection 5,000 Units Q12H    insulin aspart U-100 pen 0-5 Units QID (AC + HS) PRN    insulin aspart U-100 pen 8-10 Units TIDWM    magnesium oxide tablet 800 mg BID    midodrine tablet 15 mg TID    mycophenolate capsule 500 mg BID    ondansetron disintegrating tablet 8 mg Q8H PRN    ondansetron injection 4 mg Q8H PRN    pantoprazole EC tablet 40 mg BID    polyethylene glycol packet 17 g Daily PRN    polyethylene glycol packet 17 g Daily    predniSONE tablet 15 mg Daily    Followed by    [START ON 11/17/2018] predniSONE tablet 10 mg Daily    Followed by    [START ON 11/24/2018] predniSONE tablet 5 mg Daily    Followed by    [START ON 12/1/2018] predniSONE tablet 2.5 mg Daily    sodium chloride 0.9% flush 3 mL PRN    tacrolimus capsule 0.5 mg BID    traMADol tablet 50 mg Q6H PRN    ursodiol capsule 300 mg BID       Objective:     Vital Signs (Most Recent):  Temp: 97.5 °F (36.4 °C) (11/15/18 1133)  Pulse: (!) 112 (11/15/18 1109)  Resp: 14 (11/15/18 1109)  BP: 122/70  (11/15/18 1109)  SpO2: 98 % (11/15/18 1109)  O2 Device (Oxygen Therapy): room air (11/14/18 2000) Vital Signs (24h Range):  Temp:  [97.4 °F (36.3 °C)-97.6 °F (36.4 °C)] 97.5 °F (36.4 °C)  Pulse:  [] 112  Resp:  [9-17] 14  SpO2:  [97 %-100 %] 98 %  BP: ()/(58-89) 122/70     Weight: 70.8 kg (156 lb 1.4 oz) (11/12/18 0530)  Body mass index is 27.65 kg/m².  Body surface area is 1.77 meters squared.    I/O last 3 completed shifts:  In: 960 [P.O.:960]  Out: 2300 [Urine:2300]    Physical Exam   Constitutional: She is oriented to person, place, and time. She appears well-developed. No distress.   Temporal and distal extremity muscle wasting   HENT:   Head: Normocephalic and atraumatic.   Eyes: EOM are normal. No scleral icterus.   Neck: Normal range of motion.   Cardiovascular: Normal rate and regular rhythm.   No murmur heard.  Pulmonary/Chest: Effort normal. No respiratory distress. She has no wheezes.   Abdominal: Bowel sounds are normal. She exhibits distension (improved from yesterday). There is tenderness. There is no guarding.   Chevron incision w/ steri strips, minimal serous leakage from R side   RLQ VALORIE drain sites c/d/i with sutures   Musculoskeletal: Normal range of motion. She exhibits edema (2+ BLE, + generalized).   Neurological: She is alert and oriented to person, place, and time.   Skin: Skin is warm. She is not diaphoretic.   Psychiatric: She has a normal mood and affect.   Nursing note and vitals reviewed.      Significant Labs:  CMP:   Recent Labs   Lab 11/15/18  0845   *   CALCIUM 8.7   ALBUMIN 3.1*   PROT 4.9*   *   K 4.9   CO2 24   CL 94*   *   CREATININE 2.8*   ALKPHOS 133   ALT 22   AST 20   BILITOT 1.5*     All labs within the past 24 hours have been reviewed.

## 2018-11-15 NOTE — ASSESSMENT & PLAN NOTE
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   - POD 1 US: increased arterial resistive indices, suggestive of edema vs acute rejection vs congestion.  - Repeat US 10/9 with continued elevation of RIs.  - LFTs stable.  - T bili trending down.   - Repeat liver US (10/11): elevated RIs.  - Liver US 11/6 to assess for ascites. No arterial flow within the liver allograft concerning for interval thrombosis/occlusion of the arterial system. Severe ascites seen.  - Liver US 11/8: arterial flow seen w/ elevated RIs   - LFTs remain stable. No CTA at this time due to EVA and normal liver function.   - Liver US 11/15 showed "abnormally decreased main hepatic artery peak systolic velocity with slow arterial flow and abnormal tardus parvus waveforms intrahepatically." Vascular surgery consulted. Tentative hepatic angiogram Monday 11/19.      "

## 2018-11-16 ENCOUNTER — ANESTHESIA EVENT (OUTPATIENT)
Dept: SURGERY | Facility: HOSPITAL | Age: 68
End: 2018-11-16

## 2018-11-16 LAB
ALBUMIN SERPL BCP-MCNC: 2.9 G/DL
ALP SERPL-CCNC: 129 U/L
ALT SERPL W/O P-5'-P-CCNC: 21 U/L
ANION GAP SERPL CALC-SCNC: 12 MMOL/L
AST SERPL-CCNC: 20 U/L
BACTERIA SPEC ANAEROBE CULT: NORMAL
BASOPHILS # BLD AUTO: 0.02 K/UL
BASOPHILS NFR BLD: 0.3 %
BILIRUB SERPL-MCNC: 1.6 MG/DL
BUN SERPL-MCNC: 102 MG/DL
CALCIUM SERPL-MCNC: 8.5 MG/DL
CHLORIDE SERPL-SCNC: 92 MMOL/L
CMV DNA SERPL NAA+PROBE-ACNC: 87 IU/ML
CO2 SERPL-SCNC: 25 MMOL/L
CREAT SERPL-MCNC: 2.5 MG/DL
DIFFERENTIAL METHOD: ABNORMAL
EOSINOPHIL # BLD AUTO: 0 K/UL
EOSINOPHIL NFR BLD: 0.1 %
ERYTHROCYTE [DISTWIDTH] IN BLOOD BY AUTOMATED COUNT: 17.2 %
EST. GFR  (AFRICAN AMERICAN): 21.9 ML/MIN/1.73 M^2
EST. GFR  (NON AFRICAN AMERICAN): 19 ML/MIN/1.73 M^2
GLUCOSE SERPL-MCNC: 161 MG/DL
HCT VFR BLD AUTO: 27.1 %
HGB BLD-MCNC: 8.6 G/DL
IMM GRANULOCYTES # BLD AUTO: 0.12 K/UL
IMM GRANULOCYTES NFR BLD AUTO: 1.7 %
LYMPHOCYTES # BLD AUTO: 0.3 K/UL
LYMPHOCYTES NFR BLD: 4.7 %
MAGNESIUM SERPL-MCNC: 2.2 MG/DL
MCH RBC QN AUTO: 29 PG
MCHC RBC AUTO-ENTMCNC: 31.7 G/DL
MCV RBC AUTO: 91 FL
MONOCYTES # BLD AUTO: 0.6 K/UL
MONOCYTES NFR BLD: 8.4 %
NEUTROPHILS # BLD AUTO: 6.1 K/UL
NEUTROPHILS NFR BLD: 84.8 %
NRBC BLD-RTO: 0 /100 WBC
PHOSPHATE SERPL-MCNC: 3.3 MG/DL
PLATELET # BLD AUTO: 116 K/UL
PMV BLD AUTO: 10.2 FL
POCT GLUCOSE: 176 MG/DL (ref 70–110)
POCT GLUCOSE: 177 MG/DL (ref 70–110)
POCT GLUCOSE: 181 MG/DL (ref 70–110)
POCT GLUCOSE: 88 MG/DL (ref 70–110)
POTASSIUM SERPL-SCNC: 3.8 MMOL/L
PROT SERPL-MCNC: 4.8 G/DL
RBC # BLD AUTO: 2.97 M/UL
SODIUM SERPL-SCNC: 129 MMOL/L
TACROLIMUS BLD-MCNC: 5.5 NG/ML
WBC # BLD AUTO: 7.17 K/UL

## 2018-11-16 PROCEDURE — 99233 SBSQ HOSP IP/OBS HIGH 50: CPT | Mod: ,,, | Performed by: INTERNAL MEDICINE

## 2018-11-16 PROCEDURE — 99900035 HC TECH TIME PER 15 MIN (STAT)

## 2018-11-16 PROCEDURE — 25000003 PHARM REV CODE 250: Performed by: PHYSICIAN ASSISTANT

## 2018-11-16 PROCEDURE — 94664 DEMO&/EVAL PT USE INHALER: CPT

## 2018-11-16 PROCEDURE — 80053 COMPREHEN METABOLIC PANEL: CPT

## 2018-11-16 PROCEDURE — 20600001 HC STEP DOWN PRIVATE ROOM

## 2018-11-16 PROCEDURE — 85025 COMPLETE CBC W/AUTO DIFF WBC: CPT

## 2018-11-16 PROCEDURE — 63600175 PHARM REV CODE 636 W HCPCS: Performed by: NURSE PRACTITIONER

## 2018-11-16 PROCEDURE — 84100 ASSAY OF PHOSPHORUS: CPT

## 2018-11-16 PROCEDURE — 25000003 PHARM REV CODE 250: Performed by: NURSE PRACTITIONER

## 2018-11-16 PROCEDURE — 25000003 PHARM REV CODE 250: Performed by: TRANSPLANT SURGERY

## 2018-11-16 PROCEDURE — 87186 SC STD MICRODIL/AGAR DIL: CPT

## 2018-11-16 PROCEDURE — 97110 THERAPEUTIC EXERCISES: CPT

## 2018-11-16 PROCEDURE — 63600175 PHARM REV CODE 636 W HCPCS: Performed by: PHYSICIAN ASSISTANT

## 2018-11-16 PROCEDURE — 87070 CULTURE OTHR SPECIMN AEROBIC: CPT

## 2018-11-16 PROCEDURE — 87077 CULTURE AEROBIC IDENTIFY: CPT

## 2018-11-16 PROCEDURE — 80197 ASSAY OF TACROLIMUS: CPT

## 2018-11-16 PROCEDURE — 97116 GAIT TRAINING THERAPY: CPT

## 2018-11-16 PROCEDURE — 27000646 HC AEROBIKA DEVICE

## 2018-11-16 PROCEDURE — 99233 SBSQ HOSP IP/OBS HIGH 50: CPT | Mod: 24,,, | Performed by: PHYSICIAN ASSISTANT

## 2018-11-16 PROCEDURE — 83735 ASSAY OF MAGNESIUM: CPT

## 2018-11-16 RX ORDER — BISACODYL 5 MG
10 TABLET, DELAYED RELEASE (ENTERIC COATED) ORAL NIGHTLY
Status: DISCONTINUED | OUTPATIENT
Start: 2018-11-16 | End: 2018-11-22

## 2018-11-16 RX ORDER — BISACODYL 10 MG
10 SUPPOSITORY, RECTAL RECTAL DAILY PRN
Status: DISCONTINUED | OUTPATIENT
Start: 2018-11-16 | End: 2018-12-21 | Stop reason: HOSPADM

## 2018-11-16 RX ADMIN — MIDODRINE HYDROCHLORIDE 15 MG: 5 TABLET ORAL at 08:11

## 2018-11-16 RX ADMIN — INSULIN ASPART 10 UNITS: 100 INJECTION, SOLUTION INTRAVENOUS; SUBCUTANEOUS at 02:11

## 2018-11-16 RX ADMIN — PANTOPRAZOLE SODIUM 40 MG: 40 TABLET, DELAYED RELEASE ORAL at 08:11

## 2018-11-16 RX ADMIN — INSULIN ASPART 10 UNITS: 100 INJECTION, SOLUTION INTRAVENOUS; SUBCUTANEOUS at 08:11

## 2018-11-16 RX ADMIN — URSODIOL 300 MG: 300 CAPSULE ORAL at 08:11

## 2018-11-16 RX ADMIN — HEPARIN SODIUM 5000 UNITS: 5000 INJECTION, SOLUTION INTRAVENOUS; SUBCUTANEOUS at 08:11

## 2018-11-16 RX ADMIN — MIDODRINE HYDROCHLORIDE 15 MG: 5 TABLET ORAL at 02:11

## 2018-11-16 RX ADMIN — ATOVAQUONE 1500 MG: 750 SUSPENSION ORAL at 08:11

## 2018-11-16 RX ADMIN — PREDNISONE 15 MG: 5 TABLET ORAL at 08:11

## 2018-11-16 RX ADMIN — ASPIRIN 81 MG CHEWABLE TABLET 81 MG: 81 TABLET CHEWABLE at 08:11

## 2018-11-16 RX ADMIN — BISACODYL 10 MG: 5 TABLET, COATED ORAL at 08:11

## 2018-11-16 RX ADMIN — TACROLIMUS 0.5 MG: 0.5 CAPSULE ORAL at 08:11

## 2018-11-16 RX ADMIN — FUROSEMIDE 80 MG: 40 TABLET ORAL at 05:11

## 2018-11-16 RX ADMIN — MAGNESIUM OXIDE TAB 400 MG (241.3 MG ELEMENTAL MG) 800 MG: 400 (241.3 MG) TAB at 08:11

## 2018-11-16 RX ADMIN — INSULIN ASPART 10 UNITS: 100 INJECTION, SOLUTION INTRAVENOUS; SUBCUTANEOUS at 05:11

## 2018-11-16 RX ADMIN — TACROLIMUS 0.5 MG: 0.5 CAPSULE ORAL at 05:11

## 2018-11-16 RX ADMIN — MYCOPHENOLATE MOFETIL 500 MG: 250 CAPSULE ORAL at 08:11

## 2018-11-16 RX ADMIN — POLYETHYLENE GLYCOL 3350 17 G: 17 POWDER, FOR SOLUTION ORAL at 08:11

## 2018-11-16 RX ADMIN — DOCUSATE SODIUM 100 MG: 100 CAPSULE, LIQUID FILLED ORAL at 08:11

## 2018-11-16 RX ADMIN — FUROSEMIDE 80 MG: 40 TABLET ORAL at 08:11

## 2018-11-16 RX ADMIN — FLUCONAZOLE 200 MG: 200 TABLET ORAL at 08:11

## 2018-11-16 NOTE — PLAN OF CARE
Problem: Patient Care Overview  Goal: Plan of Care Review  Outcome: Ongoing (interventions implemented as appropriate)    Pt AAOx4 with  at the bedside.  R UA 22g saline locked.  Up with walker and standby assist.   No changes over night.  Liver US yesterday with concern for possible HA stenosis or rejection. Rec'd f/u with CT, but nephrology rec'd hepatic angiogram instead in order to preserve renal function. Vasc. Surgery consulted.  VSS.   Chevron incision JULIANE with steri strips.   Contact iso for ESBL in urine maintained.   Self meds not pulled.   Pt remained free from falls or injury thus far. Bed is in low/ locked position, side rails are up x 2, call light is in reach.   Will continue to monitor.

## 2018-11-16 NOTE — PLAN OF CARE
Problem: Occupational Therapy Goal  Goal: Occupational Therapy Goal  Goals to be met by: 11/25/18 ( revised 11/13/18)    Patient will increase functional independence with ADLs by performing:    Feeding with [place]. Met 11/16  UE Dressing with Supervision.  LE Dressing with Supervision.  Grooming while standing at sink with Supervision.  Toileting from toilet with Supervision for hygiene and clothing management.   Toilet transfer to toilet with Supervision.       Outcome: Ongoing (interventions implemented as appropriate)  Continue with POC. Pt progressing with goals.  Laura corado OT  11/16/2018

## 2018-11-16 NOTE — ASSESSMENT & PLAN NOTE
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     Plan:  - No indication for CRRT; HD line has been removed  - Patient worsening azotemia likely secondary to her H/H dropping from GI bleed last week.   - No uremic symptoms or signs.   - Patient still with significant pitting LE edema and evidence of fluid overload, but has had significant improvement in past week.   - Would recommend angiogram> CT for evaluation of hepatic artery stenosis/rejection due to lower contrast load and high risk of LOKESH. If contrasted studies necessary, can consider oral NAC prophylaxis as well as stopping diuretics for 24 hours as well as avoiding IVF as patient is hypervolemic.   - Renal function improved today; creatinine 2.5 from 2.8 yesterday.   - Continue furosemide at 80 mg PO BID dose.

## 2018-11-16 NOTE — PT/OT/SLP PROGRESS
Physical Therapy Treatment    Patient Name:  Scarlett Reddy   MRN:  [id number]    Recommendations:     Discharge Recommendations:  home health PT, home health OT   Discharge Equipment Recommendations: walker, rolling   Barriers to discharge: None    Assessment:     Scarlett Reddy is a 68 y.o. female admitted with a medical diagnosis of Liver transplanted.  She presents with the following impairments/functional limitations:  weakness, gait instability, impaired endurance, impaired balance, decreased lower extremity function, impaired cardiopulmonary response to activity, decreased safety awareness, edema, impaired functional mobilty, impaired self care skills. Pt able to progress ambulation distance this date, but with noted fatigue and generalized weakness towards the end of gait. Pt would continue to benefit from skilled acute PT in order to address current deficits and progress functional mobility.     Rehab Prognosis:  good; patient would benefit from acute skilled PT services to address these deficits and reach maximum level of function.      Recent Surgery: Procedure(s) (LRB):  EGD (ESOPHAGOGASTRODUODENOSCOPY) (N/A) 11 Days Post-Op    Plan:     During this hospitalization, patient to be seen 4 x/week to address the above listed problems via gait training, therapeutic activities, therapeutic exercises, neuromuscular re-education  · Plan of Care Expires:  12/15/18   Plan of Care Reviewed with: patient, spouse    Subjective     Communicated with RN prior to session.  Patient found standing in doorway of room on PT entry, agreeable to treatment.      Chief Complaint: none noted   Patient comments/goals: Per translation josiah, pt reporting that she took a morphine-type pill yesterday which made her tired.   Pain/Comfort:  · Pain Rating 1: 0/10    Patients cultural, spiritual, Religion conflicts given the current situation: none noted     Objective:     Patient found with: pulse ox (continuous), telemetry     General  Precautions: Standard, fall, contact   Orthopedic Precautions:N/A   Braces: N/A     Functional Mobility:  · Transfers:   · Stand to Sit: CGA and RW     · Gait: 216 ft. with RW and close SBA  ? Multiple, intermittent standing rest breaks throughout gait  ? Decreased giuseppe, decreased step length, impaired weight-shifting ability, decreased toe-floor clearance   ? Noted fatigue towards the end of gait    AM-PAC 6 CLICK MOBILITY  Turning over in bed (including adjusting bedclothes, sheets and blankets)?: 3  Sitting down on and standing up from a chair with arms (e.g., wheelchair, bedside commode, etc.): 2  Moving from lying on back to sitting on the side of the bed?: 2  Moving to and from a bed to a chair (including a wheelchair)?: 3  Need to walk in hospital room?: 3  Climbing 3-5 steps with a railing?: 2  Basic Mobility Total Score: 15       Therapeutic Activities and Exercises:   Translation josiah and pt's  used to assist with communication during session. Questions/concerns addressed within PT scope of practice.     Patient left up in chair with all lines intact, call button in reach and pt's  present..    GOALS:   Multidisciplinary Problems     Physical Therapy Goals        Problem: Physical Therapy Goal    Goal Priority Disciplines Outcome Goal Variances Interventions   Physical Therapy Goal     PT, PT/OT Ongoing (interventions implemented as appropriate)     Description:  Goals to be met by: 2018    Patient will increase functional independence with mobility by performin. Supine to sit with Contact Guard Assistance - not met  2. Sit to stand transfer with Supervision using LRAD or no AD - not met  3. Bed to chair transfer with Supervision using LRAD or no AD - not met  4. Gait  x 250 feet with Supervision using LRAD or no AD - not met  5. Stand for 3 minutes with Supervision to increase activity tolerance - not met   6. Lower extremity exercise program x15 reps per handout, with  independence - not met                           Time Tracking:     PT Received On: 11/16/18  PT Start Time: 1012     PT Stop Time: 1027  PT Total Time (min): 15 min     Billable Minutes: Gait Training 15    Treatment Type: Treatment  PT/PTA: PT     PTA Visit Number: 0     Radha Nguyen PT, DPT   11/16/2018

## 2018-11-16 NOTE — PT/OT/SLP PROGRESS
Occupational Therapy   Treatment    Name: Scarlett Reddy  MRN: [id number]  Admitting Diagnosis:  Liver transplanted  11 Days Post-Op    Recommendations:     Discharge Recommendations: home health PT, home health OT  Discharge Equipment Recommendations:  walker, rolling  Barriers to discharge:  None    Subjective     Pain/Comfort:  · Pain Rating 1: 0/10  · Pain Rating Post-Intervention 1: 0/10    Objective:     Communicated with: RN prior to session.  Patient found with all lines intact, call button in reach and Rn notified and telemetry, pulse ox (continuous) upon OT entry to room.    General Precautions: Standard, fall, contact   Orthopedic Precautions:N/A   Braces: N/A     Occupational Performance:    Bed Mobility:    · NT, pt found standing in room; pt left seated UIC    Functional Mobility/Transfers:  · Patient completed Stand < sit Transfer with contact guard assistance  with  no assistive device   · Functional Mobility: Pt completed functional mobility in hallway ( ~300 ft) using RW and SBA- CGA. She tolerated well with no LOB or SOB. Pt demo's slow gait speed and required cues for thoracic extension and head in neutral position.     Activities of Daily Living:  None completed this date-- therapy session focused on strengthening/edurance    AMPAC 6 Click ADL: 17    Treatment & Education:  -Pt edu on OT role/POC  -Importance of OOB activity with staff assistance ( UIC throughout the day/ walk in hallways with assistance)  -Safety during functional t/f and mobility ( RW management)  - White board updated  - Multiple self care tasks completed-- assistance level noted above  - All questions/concerns answered within OT scope of practice  - Pt completed BUE AROM exercises while seated UIC including: 1x15 reps in shoulder flexion, elbow flexion/extension, chest press, and arm circles ( forward/backward).  Pt tolerated well with min rest breaks between each exercise.      Patient left up in chair with all lines intact,  call button in reach and RN notified  Education:    Assessment:     Scarlett Reddy is a 68 y.o. female with a medical diagnosis of Liver transplanted.  She presents with the following performance deficits affecting function are weakness, impaired functional mobilty, gait instability, impaired endurance, impaired self care skills, impaired balance, decreased lower extremity function, impaired skin, edema. Pt demo's good progress towards goals this date. Pt displays improved BLE edema. She would benefit from HHOT following d/c to continue to progress towards goals and ensure safe transition to home environment.     Rehab Prognosis:  Good; patient would benefit from acute skilled OT services to address these deficits and reach maximum level of function.       Plan:     Patient to be seen 4 x/week to address the above listed problems via self-care/home management, therapeutic activities, therapeutic exercises, neuromuscular re-education  · Plan of Care Expires: 12/07/18  · Plan of Care Reviewed with: patient, spouse    This Plan of care has been discussed with the patient who was involved in its development and understands and is in agreement with the identified goals and treatment plan    GOALS:   Multidisciplinary Problems     Occupational Therapy Goals        Problem: Occupational Therapy Goal    Goal Priority Disciplines Outcome Interventions   Occupational Therapy Goal     OT, PT/OT Ongoing (interventions implemented as appropriate)    Description:  Goals to be met by: 11/25/18 ( revised 11/13/18)    Patient will increase functional independence with ADLs by performing:    Feeding with [place]. Met 11/16  UE Dressing with Supervision.  LE Dressing with Supervision.  Grooming while standing at sink with Supervision.  Toileting from toilet with Supervision for hygiene and clothing management.   Toilet transfer to toilet with Supervision.                         Time Tracking:     OT Date of Treatment: 11/16/18  OT  Start Time: 1315  OT Stop Time: 1339  OT Total Time (min): 24 min    Billable Minutes:Therapeutic Exercise 24    Laura corado OT  11/16/2018

## 2018-11-16 NOTE — PROGRESS NOTES
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 Post-op course c/b hypercapneic and hypoxic respiratory failure and was reintubated though quickly extubated now doing well. Liver bx (10/6) sig for Zone 3 hemorrhage and collapse, in addition to cholestasis. Transferred from ICU on POD #4. ID consulted to comment on positive diphtheroid culture from ascitic fluid (likely skin colonization) as well as ESBL Klebsiella pneumoniae in urine culture (10/4).  Pt asymptomatic and cell count from ascitic fluid unremarkable. Per ID recs, no need to treat diphtheroids or asymptomatic ESBL Kleb pneumo bacteriuria though pt has had 6 days of therapy which would cover these organisms. Mild peritransplant ascites- repeat US 10/6 with increased arterial resistive indices. Repeat US 10/9 w/ continued elevation in RIs and fluid collections. LFTs trending down nicely.      Acute gradual worsening of renal function s/p OLTx.Creatinine on arrival 0.8 and has been up trending. Nephrology consulted for post-op EVA. Cr cont to trend up but remains to have good UOP. Had stable response to lasiz diuresis.  BUN elevated requiring multiple rounds of dialysis (10/15, 10/17, 10/20, 10/31). No significant improvements in kidney function noted.   In addition, pt H/H cont to fluctuate requiring multiple blood transfusions (10/14, 10/16, 10/18, 10/19, 10/22, 10/27, 10/29).  Pt reported dark stools 10/15 but color has normalized. FOBT+. EGD 10/18 consistent with ulcerated mucosa in duodenum s/p  coagulation. Concern for H. Pylori. Started Amoxicillin/levofloxacin (10/19, complete 11/2)). Remains on Protonix 40 mg BID. Biopsy negative for infection and CMV. Will cont close monitoring H/H, transfuse as needed with goal Hb > 7.0.  Of note, Urine cx + Klebsiella pneumoniae (10/13). Received 3 day course of ciprofloxacin, dc'd 10/19.   On 10/19 pm pt c/o crushing chest pain. Troponin 0.094, likely 2/2 ischemic stress. EKG NSR.      11/5/18 - no acute events overnight. Pt reports feeling well this am. One unit of PRBCs given 11/3 with good response. Cr level noted with only minimal increases. Lasix placed on hold per nephrology recs. Will plan to hydrate today w/ albumin. EGD 11/5 unimpressive for overt bleed. Pt remains on protonix 40 BID. Denies melena. Cont to hold heparin. WBC cont to decrease. Immunosuppression managed with hydrocortisone 50 (cont to hold Cellcept, bactrim, valcyte). Prograf now restarted and transitioned IV steroids to PO prednisone on 11/3. CMV PCR noted with 129. Cont to monitor.     Interval History:   No acute events overnight. Charted only 825 ml UOP yesterday.     Review of patient's allergies indicates:  No Known Allergies  Current Facility-Administered Medications   Medication Frequency    acetaminophen tablet 650 mg Q6H PRN    aspirin chewable tablet 81 mg Daily    atovaquone suspension 1,500 mg Daily    bisacodyl EC tablet 10 mg Daily PRN    bisacodyl suppository 10 mg Daily PRN    calcium carbonate 200 mg calcium (500 mg) chewable tablet 500 mg TID PRN    dextrose 50% injection 12.5 g PRN    dextrose 50% injection 25 g PRN    docusate sodium capsule 100 mg Daily    entecavir tablet 0.5 mg Q72H    fluconazole tablet 200 mg Daily    furosemide tablet 80 mg BID    glucagon (human recombinant) injection 1 mg PRN    glucose chewable tablet 16 g PRN    glucose chewable tablet 24 g PRN    heparin (porcine) injection 5,000 Units Q12H    insulin aspart U-100 pen 0-5 Units  QID (AC + HS) PRN    insulin aspart U-100 pen 8-10 Units TIDWM    magnesium oxide tablet 800 mg BID    midodrine tablet 15 mg TID    mycophenolate capsule 500 mg BID    ondansetron disintegrating tablet 8 mg Q8H PRN    ondansetron injection 4 mg Q8H PRN    pantoprazole EC tablet 40 mg BID    polyethylene glycol packet 17 g Daily PRN    polyethylene glycol packet 17 g Daily    [START ON 11/17/2018] predniSONE tablet 10 mg Daily    Followed by    [START ON 11/24/2018] predniSONE tablet 5 mg Daily    Followed by    [START ON 12/1/2018] predniSONE tablet 2.5 mg Daily    sodium chloride 0.9% flush 3 mL PRN    tacrolimus capsule 0.5 mg BID    traMADol tablet 50 mg Q6H PRN    ursodiol capsule 300 mg BID       Objective:     Vital Signs (Most Recent):  Temp: 97.9 °F (36.6 °C) (11/16/18 0800)  Pulse: 88 (11/16/18 0800)  Resp: 12 (11/16/18 0800)  BP: 119/72 (11/16/18 0800)  SpO2: 100 % (11/16/18 0800)  O2 Device (Oxygen Therapy): room air (11/16/18 0800) Vital Signs (24h Range):  Temp:  [97.5 °F (36.4 °C)-97.9 °F (36.6 °C)] 97.9 °F (36.6 °C)  Pulse:  [] 88  Resp:  [10-15] 12  SpO2:  [96 %-100 %] 100 %  BP: (110-127)/(63-77) 119/72     Weight: 70.8 kg (156 lb 1.4 oz) (11/12/18 0530)  Body mass index is 27.65 kg/m².  Body surface area is 1.77 meters squared.    I/O last 3 completed shifts:  In: 540 [P.O.:540]  Out: 2125 [Urine:2125]    Physical Exam   Constitutional: She is oriented to person, place, and time. She appears well-developed. No distress.   Temporal and distal extremity muscle wasting   HENT:   Head: Normocephalic and atraumatic.   Eyes: EOM are normal. No scleral icterus.   Neck: Normal range of motion.   Cardiovascular: Normal rate and regular rhythm.   No murmur heard.  Pulmonary/Chest: Effort normal. No respiratory distress. She has no wheezes.   Abdominal: Bowel sounds are normal. She exhibits distension (improved from yesterday). There is tenderness. There is no guarding.   Chevron  incision w/ steri strips, minimal serous leakage from R side   RLQ VALORIE drain sites c/d/i with sutures   Musculoskeletal: Normal range of motion. She exhibits edema (2+ BLE, + generalized).   Neurological: She is alert and oriented to person, place, and time.   Skin: Skin is warm. She is not diaphoretic.   Psychiatric: She has a normal mood and affect.   Nursing note and vitals reviewed.      Significant Labs:  CBC:   Recent Labs   Lab 11/16/18  0818   WBC 7.17   RBC 2.97*   HGB 8.6*   HCT 27.1*   *   MCV 91   MCH 29.0   MCHC 31.7*     CMP:   Recent Labs   Lab 11/16/18  0818   *   CALCIUM 8.5*   ALBUMIN 2.9*   PROT 4.8*   *   K 3.8   CO2 25   CL 92*   *   CREATININE 2.5*   ALKPHOS 129   ALT 21   AST 20   BILITOT 1.6*     All labs within the past 24 hours have been reviewed.     Significant Imaging:  US Liver Transplant Impression       Abnormally decreased main hepatic artery peak systolic velocity with slow arterial flow and abnormal tardus parvus waveforms intrahepatically.  Findings concerning for developing hepatic artery stenosis/rejection.  Recommend continued close follow-up with consideration of possible contrast enhanced triple-phase abdominal CT for further evaluation if clinically indicated.    Peritransplant fluid collection as above.    Grossly stable ascites.    Right-sided pleural effusion.    This report was flagged in Epic as abnormal.    Electronically signed by resident: Ciro Love  Date: 11/15/2018  Time: 10:19    Electronically signed by: Yvan Watkins MD  Date: 11/15/2018  Time: 10:29         Assessment/Plan:      Post LT - Acute renal failure    Post-LT EVA is multifactorial in origin and has been related to the severity of liver disease, toxicity of immunosuppressive therapy and hepatic ischaemia reperfusion injury might be a driving force in the aetiology of post-LT EVA.     Plan:  - No indication for CRRT; HD line has been removed  - Patient worsening azotemia  likely secondary to her H/H dropping from GI bleed last week.   - No uremic symptoms or signs.   - Patient still with significant pitting LE edema and evidence of fluid overload, but has had significant improvement in past week.   - Would recommend angiogram> CT for evaluation of hepatic artery stenosis/rejection due to lower contrast load and high risk of LOKESH. If contrasted studies necessary, can consider oral NAC prophylaxis as well as stopping diuretics for 24 hours as well as avoiding IVF as patient is hypervolemic.   - Renal function improved today; creatinine 2.5 from 2.8 yesterday.   - Continue furosemide at 80 mg PO BID dose.           Thank you for your consult. I will follow-up with patient. Please contact us if you have any additional questions. Case was discussed with staff attending Dr. Melissa; attestation to follow.     Edward Dye MD  PGY-3 Internal Medicine  [phone]    Nephrology  [facility]

## 2018-11-16 NOTE — ANESTHESIA PREPROCEDURE EVALUATION
[facility]  Anesthesia Pre-Operative Evaluation         Patient Name: Scarlett Reddy  YOB: 1950  MRN: [id number]    SUBJECTIVE:     Pre-operative evaluation for Procedure(s) (LRB):  ANGIOGRAM-HEPATIC (N/A)     11/16/2018    Scarlett Reddy is a 68 y.o. 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 Prolonged Qtc on 10/19 EKG.     Patient now presents for the above procedure(s).      LDA:        Peripheral IV - Single Lumen 11/13/18 2245 Right Upper Arm (Active)   Site Assessment Clean;Dry;Intact;No redness;No swelling 11/16/2018  8:00 AM   Line Status Saline locked 11/16/2018  8:00 AM   Dressing Status Clean;Dry;Intact 11/16/2018  8:00 AM   Dressing Change Due 11/17/18 11/16/2018  8:00 AM   Site Change Due 11/17/18 11/16/2018  8:00 AM   Reason Not Rotated Not due 11/16/2018  8:00 AM   Number of days: 2       Prev airway:   7.0 ETT   DL with Jay #2    Grade 1 view      Drips: None documented.      Patient Active Problem List   Diagnosis    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    Type 2 diabetes mellitus with diabetic polyneuropathy, with long-term current use of insulin    Severe malnutrition    Anemia requiring transfusions    Hypotension    Hyponatremia    Physical deconditioning    Weakness    Ascites    Liver transplanted    Prophylactic immunotherapy    Long-term use of immunosuppressant medication    At risk for opportunistic infections    Metabolic acidosis    EVA (acute kidney injury)    Hypervolemia associated with renal insufficiency      Post LT - Acute renal failure    Acute blood loss anemia    UTI (urinary tract infection)    Azotemia    Leukopenia    Alteration in skin integrity    Thrombocytopenia, unspecified    Other drug-induced pancytopenia       Review of patient's allergies indicates:  No Known Allergies    Current Inpatient Medications:   aspirin  81 mg Oral Daily    atovaquone  1,500 mg Oral Daily    docusate sodium  100 mg Oral Daily    entecavir  0.5 mg Oral Q72H    fluconazole  200 mg Oral Daily    furosemide  80 mg Oral BID    heparin (porcine)  5,000 Units Subcutaneous Q12H    insulin aspart U-100  8-10 Units Subcutaneous TIDWM    magnesium oxide  800 mg Oral BID    midodrine  15 mg Oral TID    mycophenolate  500 mg Oral BID    pantoprazole  40 mg Oral BID    polyethylene glycol  17 g Oral Daily    [START ON 11/17/2018] predniSONE  10 mg Oral Daily    Followed by    [START ON 11/24/2018] predniSONE  5 mg Oral Daily    Followed by    [START ON 12/1/2018] predniSONE  2.5 mg Oral Daily    tacrolimus  0.5 mg Oral BID    ursodiol  300 mg Oral BID       No current facility-administered medications on file prior to encounter.      Current Outpatient Medications on File Prior to Encounter   Medication Sig Dispense Refill    insulin aspart U-100 (NOVOLOG FLEXPEN U-100 INSULIN) 100 unit/mL InPn pen Inject 20 units into the skin with breakfast,17 units with lunch, and 17 with dinner Plus correction scale, max TDD 60 units daily 15 mL 1    insulin glargine (LANTUS) 100 unit/mL injection Inject 20 Units into the skin every evening. 15 mL 3    midodrine (PROAMATINE) 5 MG Tab Take 3 tablets (15 mg total) by mouth every 8 (eight) hours. 270 tablet 5    OMEGA-3 FATTY ACIDS-EPA ORAL Take by mouth.      rifAXIMin (XIFAXAN) 550 mg Tab Take 1 tablet (550 mg total) by mouth 2 (two) times daily. 60 tablet 5    sodium bicarbonate 650 MG tablet Take 2 tablets (1,300 mg total) by mouth 3 (three) times daily. 120 tablet 11     "blood sugar diagnostic (ACCU-CHEK DC) Strp 1 strip by Misc.(Non-Drug; Combo Route) route 3 (three) times daily.      pen needle, diabetic (BD ULTRA-FINE CITLALI PEN NEEDLE) 32 gauge x 5/32" Ndle To use to inject insulin 4x daily 100 each 3       Past Surgical History:   Procedure Laterality Date    EGD (ESOPHAGOGASTRODUODENOSCOPY) N/A 11/5/2018    Performed by Johann Mejia MD at [facility] (2ND FLR)    EGD (ESOPHAGOGASTRODUODENOSCOPY) N/A 10/18/2018    Performed by Chung Robins MD at [facility] (2ND FLR)    EGD (ESOPHAGOGASTRODUODENOSCOPY) N/A 8/17/2018    Performed by Travon Del[facility]illo [facility] at [facility]    ESOPHAGOGASTRODUODENOSCOPY N/A 8/17/2018    Procedure: EGD (ESOPHAGOGASTRODUODENOSCOPY);  Surgeon: Travon Delgadillo MD;  [facility]  Service: Endoscopy;  Laterality: N/A;    ESOPHAGOGASTRODUODENOSCOPY N/A 10/18/2018    Procedure: EGD (ESOPHAGOGASTRODUODENOSCOPY);  Surgeon: Chung Robins MD;  [facility]  Service: Endoscopy;  Laterality: N/A;    ESOPHAGOGASTRODUODENOSCOPY N/A 11/5/2018    Procedure: EGD (ESOPHAGOGASTRODUODENOSCOPY);  Surgeon: Johann Mejia MD;  Location: [facility] ([address] [place] [address]);  Service: Endoscopy;  Laterality: N/A;    EXPLORATORY LAPAROTOMY AFTER LIVER TRANSPLANTATION N/A 10/6/2018    Procedure: LAPAROTOMY, EXPLORATORY, AFTER LIVER TRANSPLANT;  Surgeon: Benson Matson MD;  Location: [facility] OR [address] [place] [address];  Service: Transplant;  Laterality: N/A;    LAPAROTOMY, EXPLORATORY, AFTER LIVER TRANSPLANT N/A 10/6/2018    [facility]    LIVER TRANSPLANT N/A 10/4/2018    Procedure: TRANSPLANT, LIVER;  Surgeon: Yony Burns MD;  Location: [facility] OR [address] [place] [address];  Service: Transplant;  Laterality: N/A;    TRANSPLANT, LIVER N/A 10/4/2018    Performed by Yony Burns MD at [facility] OR [address] [place] [address]       Social History     Socioeconomic History    Marital status:      Spouse name: Not on file    Number of children: Not on " file    Years of education: Not on file    Highest education level: Not on file   Social Needs    Financial resource strain: Not on file    Food insecurity - worry: Not on file    Food insecurity - inability: Not on file    Transportation needs - medical: Not on file    Transportation needs - non-medical: Not on file   Occupational History    Not on file   Tobacco Use    Smoking status: Never Smoker    Smokeless tobacco: Never Used   Substance and Sexual Activity    Alcohol use: No     Frequency: Never    Drug use: No    Sexual activity: Not on file   Other Topics Concern    Not on file   Social History Narrative    Not on file       OBJECTIVE:     Vital Signs Range (Last 24H):  Temp:  [36.4 °C (97.5 °F)-36.6 °C (97.9 °F)]   Pulse:  []   Resp:  [10-15]   BP: (110-127)/(63-77)   SpO2:  [96 %-100 %]       Significant Labs:  Lab Results   Component Value Date    WBC 7.17 11/16/2018    HGB 8.6 (L) 11/16/2018    HCT 27.1 (L) 11/16/2018     (L) 11/16/2018    ALT 21 11/16/2018    AST 20 11/16/2018     (L) 11/16/2018    K 3.8 11/16/2018    CL 92 (L) 11/16/2018    CREATININE 2.5 (H) 11/16/2018     (H) 11/16/2018    CO2 25 11/16/2018    TSH 4.113 (H) 08/14/2018    INR 0.9 11/15/2018    HGBA1C 6.0 (H) 10/04/2018       Diagnostic Studies: No relevant studies.    EKG:   Vent. Rate : 093 BPM     Atrial Rate : 093 BPM     P-R Int : 166 ms          QRS Dur : 078 ms      QT Int : 400 ms       P-R-T Axes : 033 021 029 degrees     QTc Int : 497 ms    Normal sinus rhythm  Prolonged QT  Abnormal ECG  When compared with ECG of 04-OCT-2018 21:41,  No significant change was found  Confirmed by BERNARDO OLIVAS MD ([id number]) on 10/21/2018 4:08:00 PM    2D ECHO:  Results for orders placed or performed during the hospital encounter of 10/01/18   2D echo with color flow doppler   Result Value Ref Range    QEF 75 55 - 65    Mitral Valve Regurgitation TRIVIAL     Diastolic Dysfunction Yes (A)     Aortic Valve  Regurgitation MILD     Mitral Valve Mobility NORMAL     Tricuspid Valve Regurgitation TRIVIAL          ASSESSMENT/PLAN:         Anesthesia Evaluation    I have reviewed the Patient Summary Reports.     I have reviewed the Medications.   Prednisone    Review of Systems  Anesthesia Hx:  History of prior surgery of interest to airway management or planning: Previous anesthesia: General   Hematology/Oncology:         -- Anemia:   Renal/:   Chronic Renal Disease, ARF    Hepatic/GI:   Liver Disease, Hepatitis, B S/p Liver tranplant 10/4/18   Neurological:   Peripheral Neuropathy    Endocrine:   Diabetes, type 2        Physical Exam  General:  Well nourished, Malnutrition    Airway/Jaw/Neck:  Airway Findings: Mouth Opening: Normal Tongue: Normal  General Airway Assessment: Adult  Mallampati: I  TM Distance: Normal, at least 6 cm  Jaw/Neck Findings:     Neck ROM: Normal ROM  Neck Findings:     Eyes/Ears/Nose:  EYES/EARS/NOSE FINDINGS: Normal   Dental:  Dental Findings: In tact   Chest/Lungs:  Chest/Lungs Findings: Clear to auscultation     Heart/Vascular:  Heart Findings: Rate: Normal  Rhythm: Regular Rhythm  Sounds: Normal        Mental Status:  Mental Status Findings:  Alert and Oriented         Anesthesia Plan  Type of Anesthesia, risks & benefits discussed:  Anesthesia Type:  MAC, general  Patient's Preference:   Intra-op Monitoring Plan: standard ASA monitors  Intra-op Monitoring Plan Comments:   Post Op Pain Control Plan: per primary service following discharge from PACU  Post Op Pain Control Plan Comments:   Induction:   IV  Beta Blocker:  Patient is not currently on a Beta-Blocker (No further documentation required).       Informed Consent: Patient understands risks and agrees with Anesthesia plan.  Questions answered. Anesthesia consent signed with patient.  ASA Score: 3     Day of Surgery Review of History & Physical:    H&P update referred to the surgeon.         Ready For Surgery From Anesthesia Perspective.

## 2018-11-16 NOTE — PLAN OF CARE
Problem: Physical Therapy Goal  Goal: Physical Therapy Goal  Goals to be met by: 2018    Patient will increase functional independence with mobility by performin. Supine to sit with Contact Guard Assistance - not met  2. Sit to stand transfer with Supervision using LRAD or no AD - not met  3. Bed to chair transfer with Supervision using LRAD or no AD - not met  4. Gait  x 250 feet with Supervision using LRAD or no AD - not met  5. Stand for 3 minutes with Supervision to increase activity tolerance - not met   6. Lower extremity exercise program x15 reps per handout, with independence - not met         Outcome: Ongoing (interventions implemented as appropriate)  Goals reviewed and remain appropriate. Pt progressing towards goals.    Radha Nguyen, PT, DPT   2018  [phone]

## 2018-11-16 NOTE — SUBJECTIVE & OBJECTIVE
Interval History:   No acute events overnight. Charted only 825 ml UOP yesterday.     Review of patient's allergies indicates:  No Known Allergies  Current Facility-Administered Medications   Medication Frequency    acetaminophen tablet 650 mg Q6H PRN    aspirin chewable tablet 81 mg Daily    atovaquone suspension 1,500 mg Daily    bisacodyl EC tablet 10 mg Daily PRN    bisacodyl suppository 10 mg Daily PRN    calcium carbonate 200 mg calcium (500 mg) chewable tablet 500 mg TID PRN    dextrose 50% injection 12.5 g PRN    dextrose 50% injection 25 g PRN    docusate sodium capsule 100 mg Daily    entecavir tablet 0.5 mg Q72H    fluconazole tablet 200 mg Daily    furosemide tablet 80 mg BID    glucagon (human recombinant) injection 1 mg PRN    glucose chewable tablet 16 g PRN    glucose chewable tablet 24 g PRN    heparin (porcine) injection 5,000 Units Q12H    insulin aspart U-100 pen 0-5 Units QID (AC + HS) PRN    insulin aspart U-100 pen 8-10 Units TIDWM    magnesium oxide tablet 800 mg BID    midodrine tablet 15 mg TID    mycophenolate capsule 500 mg BID    ondansetron disintegrating tablet 8 mg Q8H PRN    ondansetron injection 4 mg Q8H PRN    pantoprazole EC tablet 40 mg BID    polyethylene glycol packet 17 g Daily PRN    polyethylene glycol packet 17 g Daily    [START ON 11/17/2018] predniSONE tablet 10 mg Daily    Followed by    [START ON 11/24/2018] predniSONE tablet 5 mg Daily    Followed by    [START ON 12/1/2018] predniSONE tablet 2.5 mg Daily    sodium chloride 0.9% flush 3 mL PRN    tacrolimus capsule 0.5 mg BID    traMADol tablet 50 mg Q6H PRN    ursodiol capsule 300 mg BID       Objective:     Vital Signs (Most Recent):  Temp: 97.9 °F (36.6 °C) (11/16/18 0800)  Pulse: 88 (11/16/18 0800)  Resp: 12 (11/16/18 0800)  BP: 119/72 (11/16/18 0800)  SpO2: 100 % (11/16/18 0800)  O2 Device (Oxygen Therapy): room air (11/16/18 0800) Vital Signs (24h Range):  Temp:  [97.5 °F (36.4  °C)-97.9 °F (36.6 °C)] 97.9 °F (36.6 °C)  Pulse:  [] 88  Resp:  [10-15] 12  SpO2:  [96 %-100 %] 100 %  BP: (110-127)/(63-77) 119/72     Weight: 70.8 kg (156 lb 1.4 oz) (11/12/18 0530)  Body mass index is 27.65 kg/m².  Body surface area is 1.77 meters squared.    I/O last 3 completed shifts:  In: 540 [P.O.:540]  Out: 2125 [Urine:2125]    Physical Exam   Constitutional: She is oriented to person, place, and time. She appears well-developed. No distress.   Temporal and distal extremity muscle wasting   HENT:   Head: Normocephalic and atraumatic.   Eyes: EOM are normal. No scleral icterus.   Neck: Normal range of motion.   Cardiovascular: Normal rate and regular rhythm.   No murmur heard.  Pulmonary/Chest: Effort normal. No respiratory distress. She has no wheezes.   Abdominal: Bowel sounds are normal. She exhibits distension (improved from yesterday). There is tenderness. There is no guarding.   Chevron incision w/ steri strips, minimal serous leakage from R side   RLQ VALORIE drain sites c/d/i with sutures   Musculoskeletal: Normal range of motion. She exhibits edema (2+ BLE, + generalized).   Neurological: She is alert and oriented to person, place, and time.   Skin: Skin is warm. She is not diaphoretic.   Psychiatric: She has a normal mood and affect.   Nursing note and vitals reviewed.      Significant Labs:  CBC:   Recent Labs   Lab 11/16/18  0818   WBC 7.17   RBC 2.97*   HGB 8.6*   HCT 27.1*   *   MCV 91   MCH 29.0   MCHC 31.7*     CMP:   Recent Labs   Lab 11/16/18  0818   *   CALCIUM 8.5*   ALBUMIN 2.9*   PROT 4.8*   *   K 3.8   CO2 25   CL 92*   *   CREATININE 2.5*   ALKPHOS 129   ALT 21   AST 20   BILITOT 1.6*     All labs within the past 24 hours have been reviewed.     Significant Imaging:  US Liver Transplant Impression       Abnormally decreased main hepatic artery peak systolic velocity with slow arterial flow and abnormal tardus parvus waveforms intrahepatically.  Findings  concerning for developing hepatic artery stenosis/rejection.  Recommend continued close follow-up with consideration of possible contrast enhanced triple-phase abdominal CT for further evaluation if clinically indicated.    Peritransplant fluid collection as above.    Grossly stable ascites.    Right-sided pleural effusion.    This report was flagged in Epic as abnormal.    Electronically signed by resident: Ciro Love  Date: 11/15/2018  Time: 10:19    Electronically signed by: Yvan Watkins MD  Date: 11/15/2018  Time: 10:29

## 2018-11-16 NOTE — PROGRESS NOTES
[facility]  Liver Transplant  Progress Note    Patient Name: Scarlett Reddy  MRN: [id number]  Admission Date: 10/1/2018  Hospital Length of Stay: 45 days  Code Status: Full Code  Primary Care Provider: Primary Doctor No  Post-Operative Day: 42    ORGAN:   LIVER  Disease Etiology: Cirrhosis: Type B and D  Donor Type:    - Brain Death  CDC High Risk:   No  Donor CMV Status:   Donor CMV Status: Positive  Donor HBcAB:   Negative  Donor HCV Status:   Negative  Donor HBV SETH: Negative  Donor HCV SETH: Negative  Whole or Partial: Whole Liver  Biliary Anastomosis: End to End  Arterial Anatomy: Replaced Left Hepatic and Right Hepatic  Subjective:     History of Present Illness:  Ms. Reddy is a 69 y/o female with PMH of ESLD secondary Hep B and D.  Listed for liver transplant with MELD 23.  Paracentesis 10/1 with 5L removed, no fluid sent for analysis.  Morning labs significant for hyponatremia, Na 119.  Pt admitted to LTS for sodium monitoring.      Hospital Course: 69 y/o F h/o HBV/delta cirrhosis s/p DBDLT 10/5/18 (CMV D+/R+, steroid induction, MMF/tacro/pred maintenance) with take back on 10/6 2/2 hyperbilirubinema and bilious VALORIE drainage, no leak identified. Post-op course c/b hypercapneic and hypoxic respiratory failure and was reintubated though quickly extubated now doing well. Liver bx (10/6) sig for Zone 3 hemorrhage and collapse, in addition to cholestasis. Transferred from ICU on POD #4. ID consulted to comment on positive diphtheroid culture from ascitic fluid (likely skin colonization) as well as ESBL Klebsiella pneumoniae in urine culture (10/4).  Pt asymptomatic and cell count from ascitic fluid unremarkable. Per ID recs, no need to treat diphtheroids or asymptomatic ESBL Kleb pneumo bacteriuria though pt has had 6 days of therapy which would cover these organisms. Mild peritransplant ascites- repeat US 10/6 with increased arterial resistive indices. Repeat US 10/9 w/ continued  "elevation in RIs and fluid collections. LFTs trending down nicely.     Acute gradual worsening of renal function s/p OLTx.Creatinine on arrival 0.8 and has been up trending. Nephrology consulted for post-op EVA. Cr cont to trend up but remains to have good UOP. Had stable response to lasiz diuresis.  BUN elevated requiring multiple rounds of dialysis (10/15, 10/17, 10/20, 10/31). No significant improvements in kidney function noted.   In addition, pt H/H cont to fluctuate requiring multiple blood transfusions (10/14, 10/16, 10/18, 10/19, 10/22, 10/27, 10/29).  Pt reported dark stools 10/15 but color has normalized. FOBT+. EGD 10/18 consistent with ulcerated mucosa in duodenum s/p coagulation. Concern for H. Pylori. Started Amoxicillin/levofloxacin (10/19, complete 11/2)). Remains on Protonix 40 mg BID. Biopsy negative for infection and CMV. Will cont close monitoring H/H, transfuse as needed with goal Hb > 7.0.  Of note, Urine cx + Klebsiella pneumoniae (10/13). Received 3 day course of ciprofloxacin, dc'd 10/19.   On 10/19 pm pt c/o crushing chest pain. Troponin 0.094, likely 2/2 ischemic stress. EKG NSR.   Liver US 11/6 showed no arterial flow.   Paracentesis 11/7 with 8.9 L removed. Wbc 39, segs 16%. Gram stain no organisms seen + no WBC. Pt feels better since having fluid removed but she continues to have worsening abdominal distention & LE edema.Repeat Liver US 11/8 showed arterial flow with increased RIs.Paracentesis 11/12 with 8.6L removed (WBC 34, segs 25%).   11/15 liver u/s showed abnormally decreased main hepatic artery peak systolic velocity with slow arterial flow and abnormal tardus parvus waveforms intrahepatically." Consulted vascular surgery.    Interval history: No acute events overnight. Pt states she feels much better today than she did yesterday. She still has significant abdominal distention and LE edema. However, renal fx has started to show improvement with Cr down to 2.5 from 108 and  " "from 103. Adequate UOP. Will continue Lasix 80 mg BID and monitor. Poor PO intake, but improving. Pt c/o constipation. Resumed scheduled ducolax + PRN suppository in addition to current regimen of colace TID. Wbc improving. Cellcept restarted at 500 mg bid on 11/6. CMV  on 11/1. CMV  11/8. Continue to hold Valcyte. H/H stable. Decreased Heparin to BID from TID Yesterday. Patient eager to d/c heparin as she is experiencing bruising and prolonged bleeding. Discussed with pt + team and decided if pt ambulates 2-3 times today in the jones, including working with PT, we will d/c the heparin. Liver US 11/15 showed "abnormally decreased main hepatic artery peak systolic velocity with slow arterial flow and abnormal tardus parvus waveforms intrahepatically." No plan for CTA 2/2 poor kidney function. Vascular surgery consulted. Tentative hepatic angiogram on Monday. Will monitor.    Scheduled Meds:   aspirin  81 mg Oral Daily    atovaquone  1,500 mg Oral Daily    docusate sodium  100 mg Oral Daily    entecavir  0.5 mg Oral Q72H    fluconazole  200 mg Oral Daily    furosemide  80 mg Oral BID    heparin (porcine)  5,000 Units Subcutaneous Q12H    insulin aspart U-100  8-10 Units Subcutaneous TIDWM    magnesium oxide  800 mg Oral BID    midodrine  15 mg Oral TID    mycophenolate  500 mg Oral BID    pantoprazole  40 mg Oral BID    polyethylene glycol  17 g Oral Daily    predniSONE  15 mg Oral Daily    Followed by    [START ON 11/17/2018] predniSONE  10 mg Oral Daily    Followed by    [START ON 11/24/2018] predniSONE  5 mg Oral Daily    Followed by    [START ON 12/1/2018] predniSONE  2.5 mg Oral Daily    tacrolimus  0.5 mg Oral BID    ursodiol  300 mg Oral BID     Continuous Infusions:  PRN Meds:acetaminophen, bisacodyl, bisacodyl, calcium carbonate, dextrose 50%, dextrose 50%, glucagon (human recombinant), glucose, glucose, insulin aspart U-100, ondansetron, ondansetron, polyethylene glycol, " sodium chloride 0.9%, traMADol    Review of Systems   Constitutional: Positive for activity change, appetite change and fatigue. Negative for chills and fever.   HENT: Negative for congestion, facial swelling and trouble swallowing.    Eyes: Negative for pain, discharge and visual disturbance.   Respiratory: Negative for cough, chest tightness, shortness of breath and wheezing.    Cardiovascular: Positive for leg swelling. Negative for chest pain and palpitations.   Gastrointestinal: Positive for abdominal distention, abdominal pain and constipation. Negative for blood in stool, diarrhea, nausea and vomiting.   Endocrine: Negative.    Genitourinary: Positive for decreased urine volume. Negative for difficulty urinating and dysuria.   Musculoskeletal: Negative for arthralgias, back pain, neck pain and neck stiffness.   Skin: Positive for wound.   Allergic/Immunologic: Positive for immunocompromised state.   Neurological: Positive for weakness. Negative for tremors and headaches.   Psychiatric/Behavioral: Negative for confusion and sleep disturbance.     Objective:     Vital Signs (Most Recent):  Temp: 97.5 °F (36.4 °C) (11/15/18 1133)  Pulse: (!) 112 (11/15/18 1109)  Resp: 14 (11/15/18 1109)  BP: 122/70 (11/15/18 1109)  SpO2: 98 % (11/15/18 1109) Vital Signs (24h Range):  Temp:  [97.4 °F (36.3 °C)-97.6 °F (36.4 °C)] 97.5 °F (36.4 °C)  Pulse:  [] 112  Resp:  [9-17] 14  SpO2:  [95 %-100 %] 98 %  BP: ()/(58-89) 122/70     Weight: 70.8 kg (156 lb 1.4 oz)  Body mass index is 27.65 kg/m².    Intake/Output - Last 3 Shifts       11/13 0700 - 11/14 0659 11/14 0700 - 11/15 0659 11/15 0700 - 11/16 0659    P.O. 1130 540     Total Intake(mL/kg) 1130 (16) 540 (7.6)     Urine (mL/kg/hr) 1750 (1) 1300 (0.8) 475 (1.1)    Other       Stool 0 0     Total Output 1750 1300 475    Net -620 -760 -475           Stool Occurrence 2 x 0 x           Physical Exam   Constitutional: She is oriented to person, place, and time. She  appears well-developed. No distress.   Temporal and distal extremity muscle wasting   HENT:   Head: Normocephalic and atraumatic.   Eyes: EOM are normal. No scleral icterus.   Neck: Normal range of motion.   Cardiovascular: Normal rate and regular rhythm.   No murmur heard.  Pulmonary/Chest: Effort normal. No respiratory distress. She has no wheezes.   Decreased at bases   Abdominal: Bowel sounds are normal. She exhibits distension. There is tenderness. There is no guarding.   Chevron incision w/ steri strips, minimal serous leakage from R side   RLQ VALORIE drain sites c/d/i with sutures   Musculoskeletal: Normal range of motion. She exhibits edema (2+ BLE, + generalized).   Neurological: She is alert and oriented to person, place, and time.   Skin: Skin is warm. She is not diaphoretic.   Psychiatric: She has a normal mood and affect.   Nursing note and vitals reviewed.      Laboratory:  Immunosuppressants         Stop Route Frequency     tacrolimus capsule 0.5 mg      -- Oral 2 times daily     mycophenolate capsule 500 mg      -- Oral 2 times daily        CBC:   Recent Labs   Lab 11/15/18  0846   WBC 5.22   RBC 3.22*   HGB 9.4*   HCT 29.8*   *   MCV 93   MCH 29.2   MCHC 31.5*     CMP:   Recent Labs   Lab 11/15/18  0845   *   CALCIUM 8.7   ALBUMIN 3.1*   PROT 4.9*   *   K 4.9   CO2 24   CL 94*   *   CREATININE 2.8*   ALKPHOS 133   ALT 22   AST 20   BILITOT 1.5*     Labs within the past 24 hours have been reviewed.    Diagnostic Results:  I have personally reviewed all pertinent imaging studies.    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   - POD 1 US: increased arterial resistive indices, suggestive of edema vs acute rejection vs congestion.  - Repeat US 10/9 with continued elevation of RIs.  - LFTs stable.  - T bili  "trending down.   - Repeat liver US (10/11): elevated RIs.  - Liver US 11/6 to assess for ascites. No arterial flow within the liver allograft concerning for interval thrombosis/occlusion of the arterial system. Severe ascites seen.  - Liver US 11/8: arterial flow seen w/ elevated RIs   - LFTs remain stable. No CTA at this time due to EVA and normal liver function.   - Liver US 11/15 showed "abnormally decreased main hepatic artery peak systolic velocity with slow arterial flow and abnormal tardus parvus waveforms intrahepatically." Vascular surgery consulted. Tentative hepatic angiogram Monday 11/19.         Other drug-induced pancytopenia    - Decreased plt, wbc, h/h  - See "leukopenia"       Thrombocytopenia, unspecified    - Improving.  - Monitor.       Alteration in skin integrity    Monitor.       Leukopenia    - Stopped Cellcept, Bactrim, and Valcyte 10/31 2/2 leukopenia  - Started Atovaquone 11/6.   - CMV PCR noted to be positive at 129. Will hold off on treatment with valcyte at this time.  - WBC improving   - Continue to monitor.       Azotemia    - CRRT 10/31 for uremic toxin clearance.           UTI (urinary tract infection)    - Urine Cx 10/15 w/ Klebsiella pneumonia.  - Started Ciprofloxacin (10/17-10/19).   - Started on amoxicillin/levo for H. Pylori (completed abx 11/2).  - Pt asymptomatic at this time.       Acute blood loss anemia    - H&H cont to fluctuate.   - FOBT +.  - EGD 10/18 - showed diffuse bleeding with ulcerated duodenal mucosa.  - EGD 11/5 - improvement seen, no active bleeding.  - 1 unit PRBCs transfused 10/27 and 11/3.   - H&H with good response.   - Resume heparin sq injections 11/6.   - Decrease heparin sq from TID to BID 11/15   - If pt ambulates 2-3 times 11/16, ok to d/c heparin (2/2 c/o increased bruising and prolonged bleeding)  - See "anemia requiring transfusions."  - Continue to monitor.             Post LT - Acute renal failure    - Creatinine on arrival 0.8 and trended " "up.  - Nephrology consulted.  - Renal US (10/11): no hydronephrosis.  - Echo 10/12: diastolic dysfunction, likely 2/2 to ARF.  - CRRT 10/14; SLED 10/17, 10/20. --> no improvements noted in kidney fxn.  - 24 hr urine creatine collection: CrCl of 7.   - CRRT 10/31 with 1L removed.   - See "hypervolemia associated with renal insufficiency"     Hypervolemia associated with renal insufficiency    - CRRT 10/14, 10/17, 10/20, 10/31.  - CXR 10/30: abundant fluid in b/l pleural space.  - Restarted Lasix 40 BID x 2 doses (11/2).  - Lasix held and then restarted 11/9 at 40 mg BID per nephrology recs.  - Pt not showing improvement in fluid status.   - Paracentesis 11/12: 8.6L removed   - Pt given 80 mg Lasix TID 11/14. Decrease back to 80 mg Lasix BID 11/15 to preserve renal fx.  - Continue 80 mg Lasix BID.  - Monitor daily weights.       EVA (acute kidney injury)    - No plans for RRT in near future, per nephrology  - Trialysis line removed 11/13  - Nephrology involved. Appreciate recs.  - Renal fx showing improvement 11/16 w/ Cr 2.5 from 2.8 and  from 102.  - Monitor.     Metabolic acidosis    - CRRT 10/14.  - SLED 10/17, 10/20, 10/31.         At risk for opportunistic infections    - Valcyte for CMV prophylaxis--> holding (10/31) for leukopenia. Pt will need weekly CMV PCR while discontinued.  - CMV PCR noted to be positive at 129. .  - CMV PCR 11/8: 105  - CMV PCR 11/15 pending.  - Bactrim for PCP prophylaxis ([facility]).--> holding (10/31) for leukopenia.   - Start Atovaquone 11/6.  - Fluconazole for fungal prophylaxis (first dose 10/6). D/c Fluc 11/16 bc pt has been on it for > 30 days post take back.       Long-term use of immunosuppressant medication    - Prograf: stopped 10/29. Resumed 11/2.  --> Will monitor for signs of toxic side effects, check daily troughs, and change meds accordingly.  - Prednisone: stopped 10/29. Started on Hydrocortisone. pred taper resumed.  - Cellcept: stopped 10/31 for leukopenia. Restarted " Cellcept 500 mg bid, 11/6.         Prophylactic immunotherapy    - See long term use immunosuppresion.       Ascites    - Paracentesis 10/1, 5L removed, no fluid sent for analysis.  - Pt remains volume overloaded.   - Liver US 11/6 with severe ascites.   - Paracentesis 11/7 with 8.9 L removed. Wbc 39, segs 16%. Gram stain - no organisms seen and no WBC. Cultures NGTD.  - Paracentesis 11/12: 8.6 L removed. 34 wbc, 25% segs, 33 lymphs, 42 monocytes/macrophages. Cultures NGTD     Weakness    - See physical deconditioning.       Physical deconditioning    - PT/OT consulted.  - Recommend home health PT and rolling walker at discharge (orders placed 11/6).       Hyponatremia    - Na 119 on admit.  - Na was improving post transplant w HD.  - Na stable at this time.              Hypotension    - continue Midodrine 15 mg TID. Monitor       Anemia requiring transfusions    - Fluctuating H&H.    - Transfusions: 10/14; 10/16; 10/18; 10/19; 10/22; 10/27; 10/29, 10/31  - FOBT +  - LDH elevated, Hapto < 10.  - EGD 10/18: ulcerated duodenal mucosa.   - Consulted GI about this ongoing issue and further need of another EGD.  - EGD 11/5 unremarkable.   - H/H decreased again 11/13: 7.2/22.6 from 8.5/26.5  - Repeat H/H stable  - Iron studies 11/13:  95, TIBC 107, sat iron 89, transferrin 72, ferritin pending  - Hemolytic anemia workup 11/13: Haptoglobin 132, retic 3.9   - H & H stable, no need for transfusion at this time  - Will continue to monitor closely.           Severe malnutrition    - Dietary consulted.   - Temporal and distal extremity muscle wasting and edema.  - Encourage supplements and to increase nutritional intake.  - Per nephrology, pt to be on low protein diet.   - Prealbumin reviewed.      Type 2 diabetes mellitus with diabetic polyneuropathy, with long-term current use of insulin    - Continue home regimen.  - Endocrine consulted.  - Pt off insulin drip at this time.  Apprec endo recs.        Chronic hepatitis  B with delta agent with cirrhosis    - HBsAg+, Received HBIG (last weekly dose 11/2).  - Tx w/ Entecavir- dose changed to q72 hours given renal function.             VTE Risk Mitigation (From admission, onward)        Ordered     heparin (porcine) injection 5,000 Units  Every 12 hours      11/15/18 1131     IP VTE HIGH RISK PATIENT  Once      11/05/18 1145     Place sequential compression device  Until discontinued      10/05/18 0709          The patients clinical status was discussed at multidisplinary rounds, involving transplant surgery, transplant medicine, pharmacy, nursing, nutrition, and social work    Discharge Planning:  No Patient Care Coordination Note on file.      Elizabeth Dean PA-C  Liver Transplant  [facility]

## 2018-11-17 LAB
ALBUMIN SERPL BCP-MCNC: 2.5 G/DL
ALP SERPL-CCNC: 109 U/L
ALT SERPL W/O P-5'-P-CCNC: 19 U/L
ANION GAP SERPL CALC-SCNC: 11 MMOL/L
AST SERPL-CCNC: 17 U/L
BASOPHILS # BLD AUTO: 0.02 K/UL
BASOPHILS NFR BLD: 0.4 %
BILIRUB SERPL-MCNC: 1.2 MG/DL
BUN SERPL-MCNC: 100 MG/DL
CALCIUM SERPL-MCNC: 8.3 MG/DL
CHLORIDE SERPL-SCNC: 93 MMOL/L
CO2 SERPL-SCNC: 26 MMOL/L
CREAT SERPL-MCNC: 2.4 MG/DL
DIFFERENTIAL METHOD: ABNORMAL
EOSINOPHIL # BLD AUTO: 0 K/UL
EOSINOPHIL NFR BLD: 0.4 %
ERYTHROCYTE [DISTWIDTH] IN BLOOD BY AUTOMATED COUNT: 17.3 %
EST. GFR  (AFRICAN AMERICAN): 23 ML/MIN/1.73 M^2
EST. GFR  (NON AFRICAN AMERICAN): 20 ML/MIN/1.73 M^2
GLUCOSE SERPL-MCNC: 158 MG/DL
HCT VFR BLD AUTO: 23.6 %
HCT VFR BLD AUTO: 27.3 %
HGB BLD-MCNC: 7.6 G/DL
HGB BLD-MCNC: 8.7 G/DL
IMM GRANULOCYTES # BLD AUTO: 0.14 K/UL
IMM GRANULOCYTES NFR BLD AUTO: 2.5 %
LYMPHOCYTES # BLD AUTO: 0.3 K/UL
LYMPHOCYTES NFR BLD: 4.4 %
MAGNESIUM SERPL-MCNC: 2 MG/DL
MCH RBC QN AUTO: 28.9 PG
MCHC RBC AUTO-ENTMCNC: 32.2 G/DL
MCV RBC AUTO: 90 FL
MONOCYTES # BLD AUTO: 0.5 K/UL
MONOCYTES NFR BLD: 9 %
NEUTROPHILS # BLD AUTO: 4.7 K/UL
NEUTROPHILS NFR BLD: 83.3 %
NRBC BLD-RTO: 0 /100 WBC
PHOSPHATE SERPL-MCNC: 3 MG/DL
PLATELET # BLD AUTO: 109 K/UL
PMV BLD AUTO: 10.5 FL
POCT GLUCOSE: 152 MG/DL (ref 70–110)
POCT GLUCOSE: 180 MG/DL (ref 70–110)
POCT GLUCOSE: 210 MG/DL (ref 70–110)
POCT GLUCOSE: 227 MG/DL (ref 70–110)
POTASSIUM SERPL-SCNC: 3.7 MMOL/L
PROT SERPL-MCNC: 4.5 G/DL
RBC # BLD AUTO: 2.63 M/UL
SODIUM SERPL-SCNC: 130 MMOL/L
TACROLIMUS BLD-MCNC: 4.2 NG/ML
WBC # BLD AUTO: 5.68 K/UL

## 2018-11-17 PROCEDURE — 20600001 HC STEP DOWN PRIVATE ROOM

## 2018-11-17 PROCEDURE — 63600175 PHARM REV CODE 636 W HCPCS: Performed by: PHYSICIAN ASSISTANT

## 2018-11-17 PROCEDURE — 27000646 HC AEROBIKA DEVICE

## 2018-11-17 PROCEDURE — 25000003 PHARM REV CODE 250: Performed by: NURSE PRACTITIONER

## 2018-11-17 PROCEDURE — 99900035 HC TECH TIME PER 15 MIN (STAT)

## 2018-11-17 PROCEDURE — 83735 ASSAY OF MAGNESIUM: CPT

## 2018-11-17 PROCEDURE — 84100 ASSAY OF PHOSPHORUS: CPT

## 2018-11-17 PROCEDURE — 99233 SBSQ HOSP IP/OBS HIGH 50: CPT | Mod: 24,,, | Performed by: PHYSICIAN ASSISTANT

## 2018-11-17 PROCEDURE — 94664 DEMO&/EVAL PT USE INHALER: CPT

## 2018-11-17 PROCEDURE — 63600175 PHARM REV CODE 636 W HCPCS: Performed by: NURSE PRACTITIONER

## 2018-11-17 PROCEDURE — 25000003 PHARM REV CODE 250: Performed by: PHYSICIAN ASSISTANT

## 2018-11-17 PROCEDURE — 25500020 PHARM REV CODE 255: Performed by: STUDENT IN AN ORGANIZED HEALTH CARE EDUCATION/TRAINING PROGRAM

## 2018-11-17 PROCEDURE — 99232 SBSQ HOSP IP/OBS MODERATE 35: CPT | Mod: ,,, | Performed by: NURSE PRACTITIONER

## 2018-11-17 PROCEDURE — 94761 N-INVAS EAR/PLS OXIMETRY MLT: CPT

## 2018-11-17 PROCEDURE — 80053 COMPREHEN METABOLIC PANEL: CPT

## 2018-11-17 PROCEDURE — 80197 ASSAY OF TACROLIMUS: CPT

## 2018-11-17 PROCEDURE — 94799 UNLISTED PULMONARY SVC/PX: CPT

## 2018-11-17 PROCEDURE — 85025 COMPLETE CBC W/AUTO DIFF WBC: CPT

## 2018-11-17 PROCEDURE — 85014 HEMATOCRIT: CPT

## 2018-11-17 PROCEDURE — 36415 COLL VENOUS BLD VENIPUNCTURE: CPT

## 2018-11-17 PROCEDURE — 25000003 PHARM REV CODE 250: Performed by: TRANSPLANT SURGERY

## 2018-11-17 PROCEDURE — 85018 HEMOGLOBIN: CPT

## 2018-11-17 RX ORDER — INSULIN ASPART 100 [IU]/ML
6-8 INJECTION, SOLUTION INTRAVENOUS; SUBCUTANEOUS
Status: DISCONTINUED | OUTPATIENT
Start: 2018-11-17 | End: 2018-11-18

## 2018-11-17 RX ADMIN — BISACODYL 10 MG: 5 TABLET, COATED ORAL at 08:11

## 2018-11-17 RX ADMIN — MAGNESIUM OXIDE TAB 400 MG (241.3 MG ELEMENTAL MG) 800 MG: 400 (241.3 MG) TAB at 08:11

## 2018-11-17 RX ADMIN — IOHEXOL 15 ML: 350 INJECTION, SOLUTION INTRAVENOUS at 04:11

## 2018-11-17 RX ADMIN — INSULIN ASPART 8 UNITS: 100 INJECTION, SOLUTION INTRAVENOUS; SUBCUTANEOUS at 06:11

## 2018-11-17 RX ADMIN — MIDODRINE HYDROCHLORIDE 15 MG: 5 TABLET ORAL at 03:11

## 2018-11-17 RX ADMIN — ENTECAVIR 0.5 MG: 0.5 TABLET ORAL at 08:11

## 2018-11-17 RX ADMIN — MYCOPHENOLATE MOFETIL 500 MG: 250 CAPSULE ORAL at 08:11

## 2018-11-17 RX ADMIN — TACROLIMUS 0.5 MG: 0.5 CAPSULE ORAL at 08:11

## 2018-11-17 RX ADMIN — INSULIN ASPART 8 UNITS: 100 INJECTION, SOLUTION INTRAVENOUS; SUBCUTANEOUS at 12:11

## 2018-11-17 RX ADMIN — PANTOPRAZOLE SODIUM 40 MG: 40 TABLET, DELAYED RELEASE ORAL at 08:11

## 2018-11-17 RX ADMIN — ASPIRIN 81 MG CHEWABLE TABLET 81 MG: 81 TABLET CHEWABLE at 08:11

## 2018-11-17 RX ADMIN — IOHEXOL 15 ML: 350 INJECTION, SOLUTION INTRAVENOUS at 03:11

## 2018-11-17 RX ADMIN — INSULIN ASPART 1 UNITS: 100 INJECTION, SOLUTION INTRAVENOUS; SUBCUTANEOUS at 09:11

## 2018-11-17 RX ADMIN — URSODIOL 300 MG: 300 CAPSULE ORAL at 08:11

## 2018-11-17 RX ADMIN — MIDODRINE HYDROCHLORIDE 15 MG: 5 TABLET ORAL at 08:11

## 2018-11-17 RX ADMIN — PREDNISONE 10 MG: 10 TABLET ORAL at 08:11

## 2018-11-17 RX ADMIN — ATOVAQUONE 1500 MG: 750 SUSPENSION ORAL at 08:11

## 2018-11-17 RX ADMIN — HEPARIN SODIUM 5000 UNITS: 5000 INJECTION, SOLUTION INTRAVENOUS; SUBCUTANEOUS at 08:11

## 2018-11-17 RX ADMIN — TACROLIMUS 0.5 MG: 0.5 CAPSULE ORAL at 05:11

## 2018-11-17 RX ADMIN — POLYETHYLENE GLYCOL 3350 17 G: 17 POWDER, FOR SOLUTION ORAL at 08:11

## 2018-11-17 RX ADMIN — INSULIN ASPART 10 UNITS: 100 INJECTION, SOLUTION INTRAVENOUS; SUBCUTANEOUS at 08:11

## 2018-11-17 RX ADMIN — INSULIN ASPART 2 UNITS: 100 INJECTION, SOLUTION INTRAVENOUS; SUBCUTANEOUS at 06:11

## 2018-11-17 RX ADMIN — FUROSEMIDE 80 MG: 40 TABLET ORAL at 08:11

## 2018-11-17 RX ADMIN — DOCUSATE SODIUM 100 MG: 100 CAPSULE, LIQUID FILLED ORAL at 08:11

## 2018-11-17 RX ADMIN — FUROSEMIDE 80 MG: 40 TABLET ORAL at 05:11

## 2018-11-17 NOTE — PROGRESS NOTES
"[facility]  Endocrinology  Progress Note    Admit Date: 10/1/2018     Reason for Consult: Management of type 2 DM, Hyperglycemia      Surgical Procedure and Date: Liver transplant 10/5/18, Ex lap 10/6/18     Diabetes diagnosis year:      Home Diabetes Medications:  Lantus 18 units HS and novolog 17 units with meals plus correction scale (150-200 +1)        Lab Results   Component Value Date     HGBA1C 6.8 (H) 2018        How often checking glucose at home? 3-4   BG readings on regimen: 120-150.  Post prandial readings as high as upper 200s  Hypoglycemia on the regimen? yes, none recently   Missed doses on regimen?  No     Diabetes Complications include:     Diabetic peripheral neuropathy      Complicating diabetes co morbidities:   CIRRHOSIS        HPI:  Patient is a 68 y.o. female with a diagnosis of ESLD, listed for liver transplant with meld 23 who underwent live transplant on 10/5/18.  Back to OR on 10/6/18 for ex lap.  Admitted 10/1/18 for hyponatremia s/p paracentesis.  Personal has known diagnosis of diabetes, endocrine consulted for diabetes management.    Post-operative course complicated by ESBL Klebsiella pneumoniae in urine (in contact isolation), peritransplant ascites, worsening renal function (required temporary dialysis), anemia (multiple blood transfusions), and possible GI bleed.     Interval HPI:   Overnight events: remains in TSU. Purulent drainage from incision overnight. BG below goal overnight. Prednisone tapered to 10 mg daily; standard steroid taper. Creatinine remains elevated at 2.4.   Eatin-75%   Nausea: No  Hypoglycemia and intervention: No  Fever: No  TPN and/or TF: No    /73   Pulse 80   Temp 97.8 °F (36.6 °C) (Oral)   Resp 10   Ht 5' 3" (1.6 m)   Wt 63.3 kg (139 lb 8.8 oz)   LMP  (LMP Unknown)   SpO2 98%   Breastfeeding? No   BMI 24.72 kg/m²      Labs Reviewed and Include    Recent Labs   Lab 18  0656   *   CALCIUM 8.3* "   ALBUMIN 2.5*   PROT 4.5*   *   K 3.7   CO2 26   CL 93*   *   CREATININE 2.4*   ALKPHOS 109   ALT 19   AST 17   BILITOT 1.2*     Lab Results   Component Value Date    WBC 5.68 11/17/2018    HGB 7.6 (L) 11/17/2018    HCT 23.6 (L) 11/17/2018    MCV 90 11/17/2018     (L) 11/17/2018     No results for input(s): TSH, FREET4 in the last 168 hours.  Lab Results   Component Value Date    HGBA1C 6.0 (H) 10/04/2018       Nutritional status:   Body mass index is 24.72 kg/m².  Lab Results   Component Value Date    ALBUMIN 2.5 (L) 11/17/2018    ALBUMIN 2.9 (L) 11/16/2018    ALBUMIN 3.1 (L) 11/15/2018     Lab Results   Component Value Date    PREALBUMIN 15 (L) 11/03/2018    PREALBUMIN 12 (L) 09/17/2018       Estimated Creatinine Clearance: 19.8 mL/min (A) (based on SCr of 2.4 mg/dL (H)).    Accu-Checks  Recent Labs     11/14/18  1807 11/14/18  2209 11/15/18  0747 11/15/18  1035 11/15/18  1536 11/15/18  2000 11/16/18  0851 11/16/18  1334 11/16/18  1753 11/16/18  2128   POCTGLUCOSE 248* 147* 177* 203* 225* 167* 181* 176* 177* 88       Current Medications and/or Treatments Impacting Glycemic Control  Immunotherapy:    Immunosuppressants         Stop Route Frequency     tacrolimus capsule 0.5 mg      -- Oral 2 times daily     mycophenolate capsule 500 mg      -- Oral 2 times daily        Steroids:   Hormones (From admission, onward)    Start     Stop Route Frequency Ordered    12/01/18 0900  predniSONE tablet 2.5 mg      12/08 0859 Oral Daily 11/05/18 1004    11/24/18 0900  predniSONE tablet 5 mg      12/01 0859 Oral Daily 11/05/18 1004    11/17/18 0900  predniSONE tablet 10 mg      11/24 0859 Oral Daily 11/05/18 1004    10/17/18 0945  predniSONE tablet 15 mg      10/24 0859 Oral Daily 10/17/18 0943        Pressors:    Autonomic Drugs (From admission, onward)    Start     Stop Route Frequency Ordered    10/10/18 1500  midodrine tablet 15 mg      -- Oral 3 times daily 10/10/18 1122    10/06/18 1617  rocuronium  (ZEMURON) 10 mg/mL injection     Comments:  Created by cabinet override    10/07 0429   10/06/18 1617        Hyperglycemia/Diabetes Medications:   Antihyperglycemics (From admission, onward)    Start     Stop Route Frequency Ordered    11/10/18 0715  insulin aspart U-100 pen 8-10 Units      -- SubQ 3 times daily with meals 11/09/18 1906    10/14/18 0925  insulin aspart U-100 pen 0-5 Units      -- SubQ Before meals & nightly PRN 10/14/18 0825    10/07/18 1200  insulin regular (Humulin R) 100 Units in sodium chloride 0.9% 100 mL infusion      10/13 2100 IV Continuous 10/07/18 1055          ASSESSMENT and PLAN    * Liver transplanted    Managed per primary team  Avoid hypoglycemia    Recent Labs   Lab 11/17/18  0656   ALT 19   AST 17   ALKPHOS 109   BILITOT 1.2*   PROT 4.5*   ALBUMIN 2.5*            Type 2 diabetes mellitus with diabetic polyneuropathy, with long-term current use of insulin    BG goal 140-180.     Decrease Novolog to 6-8 units with meals given steroid taper; appetite remains variable each day depending on how she is feeling   Low dose correction scale given kidney function.  BG monitoring AC/HS    Start self/caregiver administration of insulin to review for home.     Discharge planning:  TBD     EVA (acute kidney injury)    Avoid insulin stacking and hypoglycemia.  Lab Results   Component Value Date    CREATININE 2.4 (H) 11/17/2018        Prophylactic immunotherapy    May increase insulin resistance.        Severe malnutrition    Oral intake encouraged, may affect BG readings         Aviva Caldera NP  Endocrinology  [facility]

## 2018-11-17 NOTE — PLAN OF CARE
Problem: Patient Care Overview  Goal: Plan of Care Review  Outcome: Ongoing (interventions implemented as appropriate)  Patient aaox4. Bed kept locked, lowest position with 2x side rails up while in bed. nonslip footwear when out of bed. Ambulates with standby assistance and use of walker. Family at bedside, attentive to patient's needs. Regular diet, eating encouraged. accuchecks ac/hs, insulin given as ordered. Abdomen distended and firm. Chevron with midline dressing cdi. +3 bilateral lower extremity edema, PO lasix given as ordered. Plan for CT abdomen this afternoon, ultrasound on Monday and angiogram Wednesday. Contact precautions maintained.

## 2018-11-17 NOTE — ASSESSMENT & PLAN NOTE
Avoid insulin stacking and hypoglycemia.  Lab Results   Component Value Date    CREATININE 2.4 (H) 11/17/2018

## 2018-11-17 NOTE — SUBJECTIVE & OBJECTIVE
Scheduled Meds:   aspirin  81 mg Oral Daily    atovaquone  1,500 mg Oral Daily    bisacodyl  10 mg Oral QHS    docusate sodium  100 mg Oral Daily    entecavir  0.5 mg Oral Q72H    furosemide  80 mg Oral BID    insulin aspart U-100  6-8 Units Subcutaneous TIDWM    magnesium oxide  800 mg Oral BID    midodrine  15 mg Oral TID    mycophenolate  500 mg Oral BID    pantoprazole  40 mg Oral BID    polyethylene glycol  17 g Oral Daily    predniSONE  10 mg Oral Daily    Followed by    [START ON 11/24/2018] predniSONE  5 mg Oral Daily    Followed by    [START ON 12/1/2018] predniSONE  2.5 mg Oral Daily    tacrolimus  0.5 mg Oral BID    ursodiol  300 mg Oral BID     Continuous Infusions:  PRN Meds:acetaminophen, bisacodyl, calcium carbonate, dextrose 50%, dextrose 50%, glucagon (human recombinant), glucose, glucose, insulin aspart U-100, ondansetron, ondansetron, polyethylene glycol, sodium chloride 0.9%, traMADol    Review of Systems   Constitutional: Positive for activity change, appetite change (decreased) and fatigue. Negative for chills and fever.   HENT: Negative for congestion, facial swelling and trouble swallowing.    Eyes: Negative for pain, discharge and visual disturbance.   Respiratory: Negative for cough, chest tightness, shortness of breath and wheezing.    Cardiovascular: Positive for leg swelling. Negative for chest pain and palpitations.   Gastrointestinal: Positive for abdominal distention and abdominal pain. Negative for constipation, diarrhea, nausea and vomiting.   Endocrine: Negative.    Genitourinary: Positive for decreased urine volume. Negative for difficulty urinating and dysuria.   Musculoskeletal: Negative for arthralgias, back pain, neck pain and neck stiffness.   Skin: Positive for wound.   Allergic/Immunologic: Positive for immunocompromised state.   Neurological: Positive for weakness. Negative for tremors and headaches.   Psychiatric/Behavioral: Negative for confusion and  sleep disturbance.     Objective:     Vital Signs (Most Recent):  Temp: 98 °F (36.7 °C) (11/17/18 0800)  Pulse: (!) 122 (11/17/18 1000)  Resp: 12 (11/17/18 0800)  BP: 111/67 (11/17/18 0800)  SpO2: 99 % (11/17/18 1000) Vital Signs (24h Range):  Temp:  [97.4 °F (36.3 °C)-98.2 °F (36.8 °C)] 98 °F (36.7 °C)  Pulse:  [] 122  Resp:  [10-23] 12  SpO2:  [96 %-99 %] 99 %  BP: (111-136)/(67-82) 111/67     Weight: 63.3 kg (139 lb 8.8 oz)  Body mass index is 24.72 kg/m².    Intake/Output - Last 3 Shifts       11/15 0700 - 11/16 0659 11/16 0700 - 11/17 0659 11/17 0700 - 11/18 0659    P.O.  360     Total Intake(mL/kg)  360 (5.7)     Urine (mL/kg/hr) 825 (0.5) 700 (0.5)     Stool  0     Total Output 825 700     Net -825 -340            Stool Occurrence  0 x           Physical Exam   Constitutional: She is oriented to person, place, and time. She appears well-developed. No distress.   Temporal and distal extremity muscle wasting   HENT:   Head: Normocephalic and atraumatic.   Eyes: EOM are normal. No scleral icterus.   Neck: Normal range of motion.   Cardiovascular: Normal rate and regular rhythm.   No murmur heard.  Pulmonary/Chest: Effort normal. No respiratory distress. She has no wheezes.   Abdominal: Bowel sounds are normal. She exhibits distension (slowly improving). There is tenderness. There is no guarding.   Purulent drainage expressed from middle chevron incision. Packing in place.   Minimal tissue erythema  RLQ VALORIE drain sites c/d/i with sutures   Musculoskeletal: Normal range of motion. She exhibits edema (2+ BLE, + generalized).   Neurological: She is alert and oriented to person, place, and time.   Skin: Skin is warm. She is not diaphoretic.   Psychiatric: She has a normal mood and affect.   Nursing note and vitals reviewed.      Laboratory:  Immunosuppressants         Stop Route Frequency     tacrolimus capsule 0.5 mg      -- Oral 2 times daily     mycophenolate capsule 500 mg      -- Oral 2 times daily         CBC:   Recent Labs   Lab 11/17/18  0657 11/17/18  1044   WBC 5.68  --    RBC 2.63*  --    HGB 7.6* 8.7*   HCT 23.6* 27.3*   *  --    MCV 90  --    MCH 28.9  --    MCHC 32.2  --      CMP:   Recent Labs   Lab 11/17/18  0656   *   CALCIUM 8.3*   ALBUMIN 2.5*   PROT 4.5*   *   K 3.7   CO2 26   CL 93*   *   CREATININE 2.4*   ALKPHOS 109   ALT 19   AST 17   BILITOT 1.2*     Labs within the past 24 hours have been reviewed.    Diagnostic Results:  I have personally reviewed all pertinent imaging studies.

## 2018-11-17 NOTE — CONSULTS
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 Post operative course complicated with respiratory failure and Klebsiella pneumonia. Those issues have been resolved. Currently with EVA and slowly improving renal function and fluid status with diuresis.     Liver ultrasound showing Main hepatic artery PSV of 23cm/s and tardus parvus waveforms. We were consulted to evaluate.     Pertinent Surgical data from Liver transplant:  Surgical Data:  Graft type (whole vs. partial): whole liver  IVC reconstruction: end to end ivc  Portal vein status: partial patent  Portal graft performed? no  Donor arterial anatomy: replaced left hepatic and right hepatic  Donor arterial inflow: other  Recipient arterial anatomy: standard  Recipient arterial inflow: proper hepatic artery  Arterial graft performed? no  Biliary reconstruction: end to end (donor common hepatic duct to recipient common bile duct)  Biliary stent: none  Disposition of Vessels from donor of transplanted organ: sent to blood bank  Damage during procurement: yes  Procurement damage comments: replaced right artery cut in the hilum    Procedure(s) Performed:   1. Back Table Preparation of Liver with needle biopsy and vascular reconstruction of replaced right hepatic artery   A replaced right hepatic artery was reconstructed by implantation to the gastroduodenal artery using 7-0 polypropylene.  Replaced Left hepatic artery was left long.       Past Medical History:   Diagnosis Date    Anemia requiring transfusions 8/16/2018    Ascites     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 8/15/2018    Cirrhosis     Cough 8/30/2018    Esophageal varices     Esophageal  varices without bleeding 8/15/2018    Hepatic encephalopathy     Hepatitis B     Hepatitis D virus infection     Hypersplenism     Hyponatremia     Hypotension     Liver transplant candidate     Neutropenia     Portal hypertension     Severe malnutrition 8/16/2018    Type 2 diabetes mellitus, with long-term current use of insulin 8/15/2018     Past Surgical History:   Procedure Laterality Date    EGD (ESOPHAGOGASTRODUODENOSCOPY) N/A 11/5/2018    Performed by Johann Mejia MD at [facility] (2ND FLR)    EGD (ESOPHAGOGASTRODUODENOSCOPY) N/A 10/18/2018    Performed by Chung Robins MD at [facility] (2ND FLR)    EGD (ESOPHAGOGASTRODUODENOSCOPY) N/A 8/17/2018    Performed by Travon Delgadillo MD at [facility] (2ND FLR)    ESOPHAGOGASTRODUODENOSCOPY N/A 8/17/2018    Procedure: EGD (ESOPHAGOGASTRODUODENOSCOPY);  Surgeon: Trvaon Delgadillo MD;  [facility]  Service: Endoscopy;  Laterality: N/A;    ESOPHAGOGASTRODUODENOSCOPY N/A 10/18/2018    Procedure: EGD (ESOPHAGOGASTRODUODENOSCOPY);  Surgeon: Chung Robins MD;  [facility]  Service: Endoscopy;  Laterality: N/A;    ESOPHAGOGASTRODUODENOSCOPY N/A 11/5/2018    Procedure: EGD (ESOPHAGOGASTRODUODENOSCOPY);  Surgeon: Johann Mejia MD;  Location: [facility] ([address] [place] [address]);  Service: Endoscopy;  Laterality: N/A;    EXPLORATORY LAPAROTOMY AFTER LIVER TRANSPLANTATION N/A 10/6/2018    Procedure: LAPAROTOMY, EXPLORATORY, AFTER LIVER TRANSPLANT;  Surgeon: Benson Matson MD;  Location: [facility] OR [address] [place] [address];  Service: Transplant;  Laterality: N/A;    LAPAROTOMY, EXPLORATORY, AFTER LIVER TRANSPLANT N/A 10/6/2018    Performed by Benson Matson MD at [facility] OR [address] [place] [address]    LIVER TRANSPLANT N/A 10/4/2018    Procedure: TRANSPLANT, LIVER;  Surgeon: Yony Burns MD;  Location: [facility] OR [address] [place] [address];  Service: Transplant;  Laterality: N/A;    TRANSPLANT, LIVER N/A 10/4/2018    Performed by Yony Burns MD at [facility] OR 2ND FLR     History reviewed. No  pertinent family history.  Social History     Socioeconomic History    Marital status:      Spouse name: Not on file    Number of children: Not on file    Years of education: Not on file    Highest education level: Not on file   Social Needs    Financial resource strain: Not on file    Food insecurity - worry: Not on file    Food insecurity - inability: Not on file    Transportation needs - medical: Not on file    Transportation needs - non-medical: Not on file   Occupational History    Not on file   Tobacco Use    Smoking status: Never Smoker    Smokeless tobacco: Never Used   Substance and Sexual Activity    Alcohol use: No     Frequency: Never    Drug use: No    Sexual activity: Not on file   Other Topics Concern    Not on file   Social History Narrative    Not on file     Review of patient's allergies indicates:  No Known Allergies    Current Facility-Administered Medications:     acetaminophen tablet 650 mg, 650 mg, Oral, Q6H PRN, Elizabeth Dean PA-C    aspirin chewable tablet 81 mg, 81 mg, Oral, Daily, Malik Adame MD, 81 mg at 11/17/18 0839    atovaquone suspension 1,500 mg, 1,500 mg, Oral, Daily, Kimber Valle NP, 1,500 mg at 11/17/18 0840    bisacodyl EC tablet 10 mg, 10 mg, Oral, QHS, Elizabeth Dean PA-C, 10 mg at 11/16/18 2057    bisacodyl suppository 10 mg, 10 mg, Rectal, Daily PRN, Elizabeth Dean PA-C    calcium carbonate 200 mg calcium (500 mg) chewable tablet 500 mg, 500 mg, Oral, TID PRN, Rossy Shaikh DNP, 500 mg at 10/10/18 2346    dextrose 50% injection 12.5 g, 12.5 g, Intravenous, PRN, Aviva Caldera NP, 12.5 g at 10/18/18 0830    dextrose 50% injection 25 g, 25 g, Intravenous, PRN, Aviva Caldera NP    docusate sodium capsule 100 mg, 100 mg, Oral, Daily, Jihan Soares PA-C, 100 mg at 11/17/18 0839    entecavir tablet 0.5 mg, 0.5 mg, Oral, Q72H, Meghna Maldonado NP, 0.5 mg at 11/14/18 2116    furosemide tablet 80 mg, 80 mg, Oral, BID,  Elizabeth Dean PA-C, 80 mg at 11/17/18 0840    glucagon (human recombinant) injection 1 mg, 1 mg, Intramuscular, PRN, Aviva Caldera NP    glucose chewable tablet 16 g, 16 g, Oral, PRN, Aviva Caldera NP    glucose chewable tablet 24 g, 24 g, Oral, PRN, Aviva Caldera NP    heparin (porcine) injection 5,000 Units, 5,000 Units, Subcutaneous, Q12H, Elizabeth PENANazia Dean PA-C, 5,000 Units at 11/17/18 0840    insulin aspart U-100 pen 0-5 Units, 0-5 Units, Subcutaneous, QID (AC + HS) PRN, Aviva Caldera NP, 2 Units at 11/15/18 1634    insulin aspart U-100 pen 6-8 Units, 6-8 Units, Subcutaneous, TIDWM, Aviva Caldera NP    magnesium oxide tablet 800 mg, 800 mg, Oral, BID, Jihan Soares PA-C, 800 mg at 11/17/18 0839    midodrine tablet 15 mg, 15 mg, Oral, TID, Jihan Soares PA-C, 15 mg at 11/17/18 0839    mycophenolate capsule 500 mg, 500 mg, Oral, BID, Kimber Valle NP, 500 mg at 11/17/18 0840    ondansetron disintegrating tablet 8 mg, 8 mg, Oral, Q8H PRN, Seble Tineo NP, 8 mg at 10/22/18 1002    ondansetron injection 4 mg, 4 mg, Intravenous, Q8H PRN, Jihan Soares PA-C, 4 mg at 11/01/18 1437    pantoprazole EC tablet 40 mg, 40 mg, Oral, BID, Kimber Valle NP, 40 mg at 11/17/18 0841    polyethylene glycol packet 17 g, 17 g, Oral, Daily PRN, Jihan Soares PA-C, 17 g at 10/26/18 1109    polyethylene glycol packet 17 g, 17 g, Oral, Daily, Jihan Soares PA-C, 17 g at 11/17/18 0841    [COMPLETED] predniSONE tablet 20 mg, 20 mg, Oral, Daily, 20 mg at 11/09/18 0835 **FOLLOWED BY** [COMPLETED] predniSONE tablet 15 mg, 15 mg, Oral, Daily, 15 mg at 11/16/18 0852 **FOLLOWED BY** predniSONE tablet 10 mg, 10 mg, Oral, Daily, 10 mg at 11/17/18 0840 **FOLLOWED BY** [START ON 11/24/2018] predniSONE tablet 5 mg, 5 mg, Oral, Daily **FOLLOWED BY** [START ON 12/1/2018] predniSONE tablet 2.5 mg, 2.5 mg, Oral, Daily, Kimber Valle, NP    sodium chloride 0.9% flush 3 mL, 3 mL,  Intravenous, PRN, Seble Tineo NP    tacrolimus capsule 0.5 mg, 0.5 mg, Oral, BID, Malik Adame MD, 0.5 mg at 11/17/18 0839    traMADol tablet 50 mg, 50 mg, Oral, Q6H PRN, Elizabeth Dean PA-C    ursodiol capsule 300 mg, 300 mg, Oral, BID, Jihan Soares PA-C, 300 mg at 11/17/18 0839    REVIEW OF SYSTEMS:  Unable to obtain    PHYSICAL EXAM:      Pulse: 80  Temp: 97.8 °F (36.6 °C)      General appearance:  Alert, chronically ill appearing, and in no distress.    Neurological:  Normal speech, no focal findings noted; CN II - XII grossly intact           Musculoskeletal: Digits/nail without cyanosis/clubbing.  Normal muscle strength/tone.                 Neck: Supple, no significant adenopathy; thyroid is not enlarged                Chest:  Clear to auscultation, no wheezes, rales or rhonchi, symmetric air entry      No use of accessory muscles             Cardiac: Normal rate and regular rhythm, S1 and S2 normal; PMI non-displaced          Abdomen: Soft, nontender, nondistended, no masses or organomegaly      No rebound tenderness noted; bowel sounds normal      Extremities:   2+ femoral pulses bilaterally      Left brachial pulse 2+     LAB RESULTS:  Lab Results   Component Value Date    K 3.7 11/17/2018    K 3.8 11/16/2018    K 4.9 11/15/2018    CREATININE 2.4 (H) 11/17/2018    CREATININE 2.5 (H) 11/16/2018    CREATININE 2.8 (H) 11/15/2018     Lab Results   Component Value Date    WBC 5.68 11/17/2018    WBC 7.17 11/16/2018    WBC 5.22 11/15/2018    HCT 23.6 (L) 11/17/2018    HCT 27.1 (L) 11/16/2018    HCT 29.8 (L) 11/15/2018     (L) 11/17/2018     (L) 11/16/2018     (L) 11/15/2018     Lab Results   Component Value Date    HGBA1C 6.0 (H) 10/04/2018    HGBA1C 6.8 (H) 08/16/2018       IMAGING:  CT abd/pelv w/o contrast   Downward sloping Celiac origin    11/12/18  Anastomosis site of the main hepatic artery demonstrates a peak systolic velocity 23 cm/sec.  Main hepatic artery  demonstrates resistive index 0.47 with tardus parvus waveform.  Left hepatic artery shows resistive index 0.58 with tardus parvus waveform.  Anterior branch of the right hepatic artery demonstrates resistive index 0.48 with tardus parvus waveform.  Posterior branch of the right hepatic artery not visualized on today's exam.    IMP/PLAN:  68 y.o. female s/p OLTx 10/05/18 with complicated post operative course. Currently recovering from EVA, Cr. 2.4 (2.5) and improving fluid status with diuresis.     1. Hepatic artery stenosis of transplanted liver - concerning PSV through hepatic artery. Donor liver with replaced right and left hepatic artery. Replaced right hepatic artery reconstructed by anastomosis to donor GDA. Recipient anastomosis to proper hepatic. Celiac origin down sloping on CT non-con. Would be best approached from left transradial/brachial.  2. Discussed with Dr. Theodore. Repeat Liver ultrasound on Monday and allow patient to recover from EVA prior to hepatic angiogram/intervention later in the week.     Anton Ordoñez MD  Vascular/Endovascular Surgery Fellow

## 2018-11-17 NOTE — SIGNIFICANT EVENT
Notified Laura Shaikh NP of purulent drainage noted from middle aspect of chevron drainage (no pain vocalized at the site). Ms. Shaikh to bedside: steri-strip removed and a small amount of thick, creamy, purulent drainage expressed from the site. Area then covered with bethany. MD to re-assess in the morning.   No

## 2018-11-17 NOTE — ASSESSMENT & PLAN NOTE
- Valcyte for CMV prophylaxis--> holding (10/31) for leukopenia. Pt will need weekly CMV PCR while discontinued.  - CMV PCR noted to be positive at 129. .  - CMV PCR 11/8: 105  - CMV PCR 11/15 with 89 copies  - Bactrim for PCP prophylaxis ([facility]).--> holding (10/31) for leukopenia.   - Start Atovaquone 11/6.  - Fluconazole for fungal prophylaxis (first dose 10/6). D/c Fluc 11/16 bc pt has been on it for > 30 days post take back.

## 2018-11-17 NOTE — ASSESSMENT & PLAN NOTE
"- H&H cont to fluctuate.   - FOBT +.  - EGD 10/18 - showed diffuse bleeding with ulcerated duodenal mucosa.  - EGD 11/5 - improvement seen, no active bleeding.  - 1 unit PRBCs transfused 10/27 and 11/3.   - H&H with good response.   - Resume heparin sq injections 11/6.   - Decrease heparin sq from TID to BID 11/15   -Heparin dc'd 11/17  - See "anemia requiring transfusions."  - Continue to monitor.         "

## 2018-11-17 NOTE — ASSESSMENT & PLAN NOTE
- No plans for RRT in near future, per nephrology  - Trialysis line removed 11/13  - Nephrology involved. Appreciate recs.  - Renal fx showing improvement   - Monitor.

## 2018-11-17 NOTE — ASSESSMENT & PLAN NOTE
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   - POD 1 US: increased arterial resistive indices, suggestive of edema vs acute rejection vs congestion.  - Repeat US 10/9 with continued elevation of RIs.  - LFTs stable.  - T bili trending down.   - Repeat liver US (10/11): elevated RIs.  - Liver US 11/6 to assess for ascites. No arterial flow within the liver allograft concerning for interval thrombosis/occlusion of the arterial system. Severe ascites seen.  - Liver US 11/8: arterial flow seen w/ elevated RIs   - LFTs remain stable. No CTA at this time due to EVA and normal liver function.   - Liver US 11/15 showed "abnormally decreased main hepatic artery peak systolic velocity with slow arterial flow and abnormal tardus parvus waveforms intrahepatically."   -Vascular surgery consulted  -No intervention at this time. Will repeat US Monday and reassess   -Middle chevron incision opened with purulent drainage. Culture pending.  -CT abd/pelvis today to assess for infection        "

## 2018-11-17 NOTE — SUBJECTIVE & OBJECTIVE
"Interval HPI:   Overnight events: remains in TSU. Purulent drainage from incision overnight. BG below goal overnight. Prednisone tapered to 10 mg daily; standard steroid taper. Creatinine remains elevated at 2.4.   Eatin-75%   Nausea: No  Hypoglycemia and intervention: No  Fever: No  TPN and/or TF: No    /73   Pulse 80   Temp 97.8 °F (36.6 °C) (Oral)   Resp 10   Ht 5' 3" (1.6 m)   Wt 63.3 kg (139 lb 8.8 oz)   LMP  (LMP Unknown)   SpO2 98%   Breastfeeding? No   BMI 24.72 kg/m²     Labs Reviewed and Include    Recent Labs   Lab 18  0656   *   CALCIUM 8.3*   ALBUMIN 2.5*   PROT 4.5*   *   K 3.7   CO2 26   CL 93*   *   CREATININE 2.4*   ALKPHOS 109   ALT 19   AST 17   BILITOT 1.2*     Lab Results   Component Value Date    WBC 5.68 2018    HGB 7.6 (L) 2018    HCT 23.6 (L) 2018    MCV 90 2018     (L) 2018     No results for input(s): TSH, FREET4 in the last 168 hours.  Lab Results   Component Value Date    HGBA1C 6.0 (H) 10/04/2018       Nutritional status:   Body mass index is 24.72 kg/m².  Lab Results   Component Value Date    ALBUMIN 2.5 (L) 2018    ALBUMIN 2.9 (L) 2018    ALBUMIN 3.1 (L) 11/15/2018     Lab Results   Component Value Date    PREALBUMIN 15 (L) 2018    PREALBUMIN 12 (L) 2018       Estimated Creatinine Clearance: 19.8 mL/min (A) (based on SCr of 2.4 mg/dL (H)).    Accu-Checks  Recent Labs     18  1807 18  2209 11/15/18  0747 11/15/18  1035 11/15/18  1536 11/15/18  2000 18  0851 18  1334 18  1753 18  2128   POCTGLUCOSE 248* 147* 177* 203* 225* 167* 181* 176* 177* 88       Current Medications and/or Treatments Impacting Glycemic Control  Immunotherapy:    Immunosuppressants         Stop Route Frequency     tacrolimus capsule 0.5 mg      -- Oral 2 times daily     mycophenolate capsule 500 mg      -- Oral 2 times daily        Steroids:   Hormones (From admission, " onward)    Start     Stop Route Frequency Ordered    12/01/18 0900  predniSONE tablet 2.5 mg      12/08 0859 Oral Daily 11/05/18 1004    11/24/18 0900  predniSONE tablet 5 mg      12/01 0859 Oral Daily 11/05/18 1004    11/17/18 0900  predniSONE tablet 10 mg      11/24 0859 Oral Daily 11/05/18 1004    10/17/18 0945  predniSONE tablet 15 mg      10/24 0859 Oral Daily 10/17/18 0943        Pressors:    Autonomic Drugs (From admission, onward)    Start     Stop Route Frequency Ordered    10/10/18 1500  midodrine tablet 15 mg      -- Oral 3 times daily 10/10/18 1122    10/06/18 1617  rocuronium (ZEMURON) 10 mg/mL injection     Comments:  Created by cabinet override    10/07 0429   10/06/18 1617        Hyperglycemia/Diabetes Medications:   Antihyperglycemics (From admission, onward)    Start     Stop Route Frequency Ordered    11/10/18 0715  insulin aspart U-100 pen 8-10 Units      -- SubQ 3 times daily with meals 11/09/18 1906    10/14/18 0925  insulin aspart U-100 pen 0-5 Units      -- SubQ Before meals & nightly PRN 10/14/18 0825    10/07/18 1200  insulin regular (Humulin R) 100 Units in sodium chloride 0.9% 100 mL infusion      10/13 2100 IV Continuous 10/07/18 9731

## 2018-11-17 NOTE — PROGRESS NOTES
[facility]  Liver Transplant  Progress Note    Patient Name: Scarlett Reddy  MRN: [id number]  Admission Date: 10/1/2018  Hospital Length of Stay: 46 days  Code Status: Full Code  Primary Care Provider: Primary Doctor No  Post-Operative Day: 43    ORGAN:   LIVER  Disease Etiology: Cirrhosis: Type B and D  Donor Type:    - Brain Death  CDC High Risk:   No  Donor CMV Status:   Donor CMV Status: Positive  Donor HBcAB:   Negative  Donor HCV Status:   Negative  Donor HBV SETH: Negative  Donor HCV SETH: Negative  Whole or Partial: Whole Liver  Biliary Anastomosis: End to End  Arterial Anatomy: Replaced Left Hepatic and Right Hepatic  Subjective:     History of Present Illness:  Ms. Reddy is a 69 y/o female with PMH of ESLD secondary Hep B and D.  Listed for liver transplant with MELD 23.  Paracentesis 10/1 with 5L removed, no fluid sent for analysis.  Morning labs significant for hyponatremia, Na 119.  Pt admitted to LTS for sodium monitoring.        Hospital Course:  Hospital Course: 69 y/o F h/o HBV/delta cirrhosis s/p DBDLT 10/5/18 (CMV D+/R+, steroid induction, MMF/tacro/pred maintenance) with take back on 10/6 2/2 hyperbilirubinema and bilious VALORIE drainage, no leak identified. Post-op course c/b hypercapneic and hypoxic respiratory failure and was reintubated though quickly extubated now doing well. Liver bx (10/6) sig for Zone 3 hemorrhage and collapse, in addition to cholestasis. Transferred from ICU on POD #4. ID consulted to comment on positive diphtheroid culture from ascitic fluid (likely skin colonization) as well as ESBL Klebsiella pneumoniae in urine culture (10/4).  Pt asymptomatic and cell count from ascitic fluid unremarkable. Per ID recs, no need to treat diphtheroids or asymptomatic ESBL Kleb pneumo bacteriuria though pt has had 6 days of therapy which would cover these organisms. Mild peritransplant ascites- repeat US 10/6 with increased arterial resistive indices. Repeat US 10/9  "w/ continued elevation in RIs and fluid collections. LFTs trending down nicely.     Acute gradual worsening of renal function s/p OLTx. Creatinine on arrival 0.8 and has been up trending. Nephrology consulted for post-op EVA. Cr cont to trend up but remains to have good UOP. Had stable response to lasiz diuresis.  BUN elevated requiring multiple rounds of dialysis (10/15, 10/17, 10/20, 10/31). No significant improvements in kidney function noted.   In addition, pt H/H cont to fluctuate requiring multiple blood transfusions (10/14, 10/16, 10/18, 10/19, 10/22, 10/27, 10/29).  Pt reported dark stools 10/15 but color has normalized. FOBT+. EGD 10/18 consistent with ulcerated mucosa in duodenum s/p coagulation. Concern for H. Pylori. Started Amoxicillin/levofloxacin (10/19, complete 11/2)). Remains on Protonix 40 mg BID. Biopsy negative for infection and CMV. Will cont close monitoring H/H, transfuse as needed with goal Hb > 7.0. Of note, Urine cx + Klebsiella pneumoniae (10/13). Received 3 day course of ciprofloxacin, dc'd 10/19.   On 10/19 pm pt c/o crushing chest pain. Troponin 0.094, likely 2/2 ischemic stress. EKG NSR.   Liver US 11/6 showed no arterial flow. Paracentesis 11/7 with 8.9 L removed. Wbc 39, segs 16%. Gram stain no organisms seen + no WBC. Pt feels better since having fluid removed but she continues to have worsening abdominal distention & LE edema.Repeat Liver US 11/8 showed arterial flow with increased RIs.Paracentesis 11/12 with 8.6L removed (WBC 34, segs 25%).   11/15 liver u/s showed abnormally decreased main hepatic artery peak systolic velocity with slow arterial flow and abnormal tardus parvus waveforms intrahepatically." Consulted vascular surgery.    Interval history: No acute events overnight. Pt with purulent drainage from middle chevron incision line with minimal erythema of surrounding tissue. No pain vocalized. Packed the opening and covered with gauze. Getting CT abd/pelvis today to " assess for infection.   Pt still has significant abdominal distention and LE edema. However, renal fx continues to show improvement with Cr down to 2.4 this am. Adequate UOP. Will continue Lasix 80 mg BID and monitor. WBC improving. Cellcept restarted at 500 mg bid on 11/6. Most recent CMV PCR 11/15 with 87 copies. Continue to hold Valcyte. Monitor levels with weekly CMV PCR. H/H decreased (7.6/23.6). Repeat H/H with improvements (8.7/27.3). Heparin dc'd today. Pt instructed to ambulate 2-3x/day. Liver US 11/15 with decreased velocity and new tardus parvus waveforms. Transplant team has spoken with vascular surgery. Will defer plans for intervention at this time. Repeating US on Monday. Cont to monitor.     Scheduled Meds:   aspirin  81 mg Oral Daily    atovaquone  1,500 mg Oral Daily    bisacodyl  10 mg Oral QHS    docusate sodium  100 mg Oral Daily    entecavir  0.5 mg Oral Q72H    furosemide  80 mg Oral BID    insulin aspart U-100  6-8 Units Subcutaneous TIDWM    magnesium oxide  800 mg Oral BID    midodrine  15 mg Oral TID    mycophenolate  500 mg Oral BID    pantoprazole  40 mg Oral BID    polyethylene glycol  17 g Oral Daily    predniSONE  10 mg Oral Daily    Followed by    [START ON 11/24/2018] predniSONE  5 mg Oral Daily    Followed by    [START ON 12/1/2018] predniSONE  2.5 mg Oral Daily    tacrolimus  0.5 mg Oral BID    ursodiol  300 mg Oral BID     Continuous Infusions:  PRN Meds:acetaminophen, bisacodyl, calcium carbonate, dextrose 50%, dextrose 50%, glucagon (human recombinant), glucose, glucose, insulin aspart U-100, ondansetron, ondansetron, polyethylene glycol, sodium chloride 0.9%, traMADol    Review of Systems   Constitutional: Positive for activity change, appetite change (decreased) and fatigue. Negative for chills and fever.   HENT: Negative for congestion, facial swelling and trouble swallowing.    Eyes: Negative for pain, discharge and visual disturbance.   Respiratory:  Negative for cough, chest tightness, shortness of breath and wheezing.    Cardiovascular: Positive for leg swelling. Negative for chest pain and palpitations.   Gastrointestinal: Positive for abdominal distention and abdominal pain. Negative for constipation, diarrhea, nausea and vomiting.   Endocrine: Negative.    Genitourinary: Positive for decreased urine volume. Negative for difficulty urinating and dysuria.   Musculoskeletal: Negative for arthralgias, back pain, neck pain and neck stiffness.   Skin: Positive for wound.   Allergic/Immunologic: Positive for immunocompromised state.   Neurological: Positive for weakness. Negative for tremors and headaches.   Psychiatric/Behavioral: Negative for confusion and sleep disturbance.     Objective:     Vital Signs (Most Recent):  Temp: 98 °F (36.7 °C) (11/17/18 0800)  Pulse: (!) 122 (11/17/18 1000)  Resp: 12 (11/17/18 0800)  BP: 111/67 (11/17/18 0800)  SpO2: 99 % (11/17/18 1000) Vital Signs (24h Range):  Temp:  [97.4 °F (36.3 °C)-98.2 °F (36.8 °C)] 98 °F (36.7 °C)  Pulse:  [] 122  Resp:  [10-23] 12  SpO2:  [96 %-99 %] 99 %  BP: (111-136)/(67-82) 111/67     Weight: 63.3 kg (139 lb 8.8 oz)  Body mass index is 24.72 kg/m².    Intake/Output - Last 3 Shifts       11/15 0700 - 11/16 0659 11/16 0700 - 11/17 0659 11/17 0700 - 11/18 0659    P.O.  360     Total Intake(mL/kg)  360 (5.7)     Urine (mL/kg/hr) 825 (0.5) 700 (0.5)     Stool  0     Total Output 825 700     Net -825 -340            Stool Occurrence  0 x           Physical Exam   Constitutional: She is oriented to person, place, and time. She appears well-developed. No distress.   Temporal and distal extremity muscle wasting   HENT:   Head: Normocephalic and atraumatic.   Eyes: EOM are normal. No scleral icterus.   Neck: Normal range of motion.   Cardiovascular: Normal rate and regular rhythm.   No murmur heard.  Pulmonary/Chest: Effort normal. No respiratory distress. She has no wheezes.   Abdominal: Bowel sounds  are normal. She exhibits distension (slowly improving). There is tenderness. There is no guarding.   Purulent drainage expressed from middle chevron incision. Packing in place.   Minimal tissue erythema  RLQ VALORIE drain sites c/d/i with sutures   Musculoskeletal: Normal range of motion. She exhibits edema (2+ BLE, + generalized).   Neurological: She is alert and oriented to person, place, and time.   Skin: Skin is warm. She is not diaphoretic.   Psychiatric: She has a normal mood and affect.   Nursing note and vitals reviewed.      Laboratory:  Immunosuppressants         Stop Route Frequency     tacrolimus capsule 0.5 mg      -- Oral 2 times daily     mycophenolate capsule 500 mg      -- Oral 2 times daily        CBC:   Recent Labs   Lab 11/17/18  0657 11/17/18  1044   WBC 5.68  --    RBC 2.63*  --    HGB 7.6* 8.7*   HCT 23.6* 27.3*   *  --    MCV 90  --    MCH 28.9  --    MCHC 32.2  --      CMP:   Recent Labs   Lab 11/17/18  0656   *   CALCIUM 8.3*   ALBUMIN 2.5*   PROT 4.5*   *   K 3.7   CO2 26   CL 93*   *   CREATININE 2.4*   ALKPHOS 109   ALT 19   AST 17   BILITOT 1.2*     Labs within the past 24 hours have been reviewed.    Diagnostic Results:  I have personally reviewed all pertinent imaging studies.    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   - POD 1 US: increased arterial resistive indices, suggestive of edema vs acute rejection vs congestion.  - Repeat US 10/9 with continued elevation of RIs.  - LFTs stable.  - T bili trending down.   - Repeat liver US (10/11): elevated RIs.  - Liver US 11/6 to assess for ascites. No arterial flow within the liver allograft concerning for interval thrombosis/occlusion of the arterial system. Severe ascites seen.  - Liver US 11/8: arterial flow seen w/ elevated RIs   - LFTs  "remain stable. No CTA at this time due to EVA and normal liver function.   - Liver US 11/15 showed "abnormally decreased main hepatic artery peak systolic velocity with slow arterial flow and abnormal tardus parvus waveforms intrahepatically."   -Vascular surgery consulted  -No intervention at this time. Will repeat US Monday and reassess   -Middle chevron incision opened with purulent drainage. Culture pending.  -CT abd/pelvis today to assess for infection           Other drug-induced pancytopenia    - Decreased plt, wbc, h/h  - See "leukopenia"       Thrombocytopenia, unspecified    - Improving.  - Monitor.       Alteration in skin integrity    Monitor.       Leukopenia    - Stopped Cellcept, Bactrim, and Valcyte 10/31 2/2 leukopenia  - Started Atovaquone 11/6.   - CMV PCR noted to be positive at 129. Will hold off on treatment with valcyte at this time.  - WBC improving   - Continue to monitor.       Azotemia    - CRRT 10/31 for uremic toxin clearance.           UTI (urinary tract infection)    - Urine Cx 10/15 w/ Klebsiella pneumonia.  - Started Ciprofloxacin (10/17-10/19).   - Started on amoxicillin/levo for H. Pylori (completed abx 11/2).  - Pt asymptomatic at this time.       Acute blood loss anemia    - H&H cont to fluctuate.   - FOBT +.  - EGD 10/18 - showed diffuse bleeding with ulcerated duodenal mucosa.  - EGD 11/5 - improvement seen, no active bleeding.  - 1 unit PRBCs transfused 10/27 and 11/3.   - H&H with good response.   - Resume heparin sq injections 11/6.   - Decrease heparin sq from TID to BID 11/15   -Heparin dc'd 11/17  - See "anemia requiring transfusions."  - Continue to monitor.             Post LT - Acute renal failure    - Creatinine on arrival 0.8 and trended up.  - Nephrology consulted.  - Renal US (10/11): no hydronephrosis.  - Echo 10/12: diastolic dysfunction, likely 2/2 to ARF.  - CRRT 10/14; SLED 10/17, 10/20. --> no improvements noted in kidney fxn.  - 24 hr urine creatine " "collection: CrCl of 7.   - CRRT 10/31 with 1L removed.   - See "hypervolemia associated with renal insufficiency"     Hypervolemia associated with renal insufficiency    - CRRT 10/14, 10/17, 10/20, 10/31.  - CXR 10/30: abundant fluid in b/l pleural space.  - Restarted Lasix 40 BID x 2 doses (11/2).  - Lasix held and then restarted 11/9 at 40 mg BID per nephrology recs.  - Pt not showing improvement in fluid status.   - Paracentesis 11/12: 8.6L removed   - Pt given 80 mg Lasix TID 11/14. Decrease back to 80 mg Lasix BID 11/15 to preserve renal fx.  - Continue 80 mg Lasix BID.  - Monitor daily weights.       EVA (acute kidney injury)    - No plans for RRT in near future, per nephrology  - Trialysis line removed 11/13  - Nephrology involved. Appreciate recs.  - Renal fx showing improvement   - Monitor.     Metabolic acidosis    - CRRT 10/14.  - SLED 10/17, 10/20, 10/31.         At risk for opportunistic infections    - Valcyte for CMV prophylaxis--> holding (10/31) for leukopenia. Pt will need weekly CMV PCR while discontinued.  - CMV PCR noted to be positive at 129. .  - CMV PCR 11/8: 105  - CMV PCR 11/15 with 89 copies  - Bactrim for PCP prophylaxis ([facility]).--> holding (10/31) for leukopenia.   - Start Atovaquone 11/6.  - Fluconazole for fungal prophylaxis (first dose 10/6). D/c Fluc 11/16 bc pt has been on it for > 30 days post take back.       Long-term use of immunosuppressant medication    - Prograf: stopped 10/29. Resumed 11/2.  --> Will monitor for signs of toxic side effects, check daily troughs, and change meds accordingly.  - Prednisone: stopped 10/29. Started on Hydrocortisone. pred taper resumed.  - Cellcept: stopped 10/31 for leukopenia. Restarted Cellcept 500 mg bid, 11/6.         Prophylactic immunotherapy    - See long term use immunosuppresion.       Ascites    - Paracentesis 10/1, 5L removed, no fluid sent for analysis.  - Pt remains volume overloaded.   - Liver US 11/6 with severe ascites.   - " Paracentesis 11/7 with 8.9 L removed. Wbc 39, segs 16%. Gram stain - no organisms seen and no WBC. Cultures NGTD.  - Paracentesis 11/12: 8.6 L removed. 34 wbc, 25% segs, 33 lymphs, 42 monocytes/macrophages. Cultures NGTD     Weakness    - See physical deconditioning.       Physical deconditioning    - PT/OT consulted.  - Recommend home health PT and rolling walker at discharge (orders placed 11/6).       Hyponatremia    - Na 119 on admit.  - Na was improving post transplant w HD.  - Na stable at this time.              Hypotension    - continue Midodrine 15 mg TID. Monitor       Anemia requiring transfusions    - Fluctuating H&H.    - Transfusions: 10/14; 10/16; 10/18; 10/19; 10/22; 10/27; 10/29, 10/31  - FOBT +  - LDH elevated, Hapto < 10.  - EGD 10/18: ulcerated duodenal mucosa.   - Consulted GI about this ongoing issue and further need of another EGD.  - EGD 11/5 unremarkable.   - H/H decreased again 11/13: 7.2/22.6 from 8.5/26.5  - Repeat H/H stable  - Iron studies 11/13:  95, TIBC 107, sat iron 89, transferrin 72, ferritin pending  - Hemolytic anemia workup 11/13: Haptoglobin 132, retic 3.9   - H & H stable, no need for transfusion at this time  - Will continue to monitor closely.           Severe malnutrition    - Dietary consulted.   - Temporal and distal extremity muscle wasting and edema.  - Encourage supplements and to increase nutritional intake.  - Per nephrology, pt to be on low protein diet.   - Prealbumin reviewed.      Type 2 diabetes mellitus with diabetic polyneuropathy, with long-term current use of insulin    - Continue home regimen.  - Endocrine consulted.  - Pt off insulin drip at this time.  Apprec endo recs.        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  - Tx w/ Entecavir- dose changed to q72 hours given renal function.             VTE Risk Mitigation (From admission, onward)        Ordered     IP VTE HIGH RISK PATIENT   Once      11/05/18 1145     Place sequential compression device  Until discontinued      10/05/18 0709          The patients clinical status was discussed at multidisplinary rounds, involving transplant surgery, transplant medicine, pharmacy, nursing, nutrition, and social work    Discharge Planning:  No Patient Care Coordination Note on file.      Jihan Soares PA-C  Liver Transplant  [facility]

## 2018-11-17 NOTE — ASSESSMENT & PLAN NOTE
BG goal 140-180.     Decrease Novolog to 6-8 units with meals given steroid taper; appetite remains variable each day depending on how she is feeling   Low dose correction scale given kidney function.  BG monitoring AC/HS    Start self/caregiver administration of insulin to review for home.     Discharge planning:  TBD

## 2018-11-17 NOTE — ASSESSMENT & PLAN NOTE
Managed per primary team  Avoid hypoglycemia    Recent Labs   Lab 11/17/18  0656   ALT 19   AST 17   ALKPHOS 109   BILITOT 1.2*   PROT 4.5*   ALBUMIN 2.5*

## 2018-11-18 PROBLEM — T81.89XA DELAYED SURGICAL WOUND HEALING: Status: ACTIVE | Noted: 2018-11-18

## 2018-11-18 LAB
ALBUMIN SERPL BCP-MCNC: 2.5 G/DL
ALP SERPL-CCNC: 115 U/L
ALT SERPL W/O P-5'-P-CCNC: 18 U/L
ANION GAP SERPL CALC-SCNC: 13 MMOL/L
AST SERPL-CCNC: 18 U/L
BASOPHILS # BLD AUTO: 0.01 K/UL
BASOPHILS NFR BLD: 0.2 %
BILIRUB SERPL-MCNC: 1.2 MG/DL
BUN SERPL-MCNC: 97 MG/DL
CALCIUM SERPL-MCNC: 8.4 MG/DL
CHLORIDE SERPL-SCNC: 92 MMOL/L
CO2 SERPL-SCNC: 25 MMOL/L
CREAT SERPL-MCNC: 2.2 MG/DL
DIFFERENTIAL METHOD: ABNORMAL
EOSINOPHIL # BLD AUTO: 0 K/UL
EOSINOPHIL NFR BLD: 0.4 %
ERYTHROCYTE [DISTWIDTH] IN BLOOD BY AUTOMATED COUNT: 17.2 %
EST. GFR  (AFRICAN AMERICAN): 25.6 ML/MIN/1.73 M^2
EST. GFR  (NON AFRICAN AMERICAN): 22.2 ML/MIN/1.73 M^2
GLUCOSE SERPL-MCNC: 130 MG/DL
HCT VFR BLD AUTO: 23.4 %
HGB BLD-MCNC: 7.7 G/DL
IMM GRANULOCYTES # BLD AUTO: 0.11 K/UL
IMM GRANULOCYTES NFR BLD AUTO: 2.1 %
LYMPHOCYTES # BLD AUTO: 0.3 K/UL
LYMPHOCYTES NFR BLD: 5.7 %
MAGNESIUM SERPL-MCNC: 2.2 MG/DL
MCH RBC QN AUTO: 29.3 PG
MCHC RBC AUTO-ENTMCNC: 32.9 G/DL
MCV RBC AUTO: 89 FL
MONOCYTES # BLD AUTO: 0.4 K/UL
MONOCYTES NFR BLD: 6.6 %
NEUTROPHILS # BLD AUTO: 4.5 K/UL
NEUTROPHILS NFR BLD: 85 %
NRBC BLD-RTO: 0 /100 WBC
PHOSPHATE SERPL-MCNC: 2.8 MG/DL
PLATELET # BLD AUTO: 111 K/UL
PMV BLD AUTO: 10.3 FL
POCT GLUCOSE: 131 MG/DL (ref 70–110)
POCT GLUCOSE: 177 MG/DL (ref 70–110)
POCT GLUCOSE: 206 MG/DL (ref 70–110)
POCT GLUCOSE: 87 MG/DL (ref 70–110)
POTASSIUM SERPL-SCNC: 4 MMOL/L
PROT SERPL-MCNC: 4.6 G/DL
RBC # BLD AUTO: 2.63 M/UL
SODIUM SERPL-SCNC: 130 MMOL/L
TACROLIMUS BLD-MCNC: 3.9 NG/ML
WBC # BLD AUTO: 5.28 K/UL

## 2018-11-18 PROCEDURE — 80197 ASSAY OF TACROLIMUS: CPT

## 2018-11-18 PROCEDURE — 25000003 PHARM REV CODE 250: Performed by: NURSE PRACTITIONER

## 2018-11-18 PROCEDURE — 83735 ASSAY OF MAGNESIUM: CPT

## 2018-11-18 PROCEDURE — 80053 COMPREHEN METABOLIC PANEL: CPT

## 2018-11-18 PROCEDURE — 85025 COMPLETE CBC W/AUTO DIFF WBC: CPT

## 2018-11-18 PROCEDURE — 36415 COLL VENOUS BLD VENIPUNCTURE: CPT

## 2018-11-18 PROCEDURE — 25000003 PHARM REV CODE 250: Performed by: PHYSICIAN ASSISTANT

## 2018-11-18 PROCEDURE — 84100 ASSAY OF PHOSPHORUS: CPT

## 2018-11-18 PROCEDURE — 20600001 HC STEP DOWN PRIVATE ROOM

## 2018-11-18 PROCEDURE — 63600175 PHARM REV CODE 636 W HCPCS: Performed by: NURSE PRACTITIONER

## 2018-11-18 PROCEDURE — 25000003 PHARM REV CODE 250: Performed by: TRANSPLANT SURGERY

## 2018-11-18 PROCEDURE — 99233 SBSQ HOSP IP/OBS HIGH 50: CPT | Mod: 24,,, | Performed by: PHYSICIAN ASSISTANT

## 2018-11-18 RX ORDER — INSULIN ASPART 100 [IU]/ML
10 INJECTION, SOLUTION INTRAVENOUS; SUBCUTANEOUS
Status: DISCONTINUED | OUTPATIENT
Start: 2018-11-18 | End: 2018-11-19

## 2018-11-18 RX ADMIN — MIDODRINE HYDROCHLORIDE 15 MG: 5 TABLET ORAL at 09:11

## 2018-11-18 RX ADMIN — PANTOPRAZOLE SODIUM 40 MG: 40 TABLET, DELAYED RELEASE ORAL at 08:11

## 2018-11-18 RX ADMIN — INSULIN ASPART 10 UNITS: 100 INJECTION, SOLUTION INTRAVENOUS; SUBCUTANEOUS at 06:11

## 2018-11-18 RX ADMIN — TACROLIMUS 0.5 MG: 0.5 CAPSULE ORAL at 05:11

## 2018-11-18 RX ADMIN — FUROSEMIDE 80 MG: 40 TABLET ORAL at 09:11

## 2018-11-18 RX ADMIN — MYCOPHENOLATE MOFETIL 500 MG: 250 CAPSULE ORAL at 09:11

## 2018-11-18 RX ADMIN — MIDODRINE HYDROCHLORIDE 15 MG: 5 TABLET ORAL at 02:11

## 2018-11-18 RX ADMIN — URSODIOL 300 MG: 300 CAPSULE ORAL at 09:11

## 2018-11-18 RX ADMIN — FUROSEMIDE 80 MG: 40 TABLET ORAL at 05:11

## 2018-11-18 RX ADMIN — DOCUSATE SODIUM 100 MG: 100 CAPSULE, LIQUID FILLED ORAL at 09:11

## 2018-11-18 RX ADMIN — PREDNISONE 10 MG: 10 TABLET ORAL at 09:11

## 2018-11-18 RX ADMIN — ATOVAQUONE 1500 MG: 750 SUSPENSION ORAL at 09:11

## 2018-11-18 RX ADMIN — TACROLIMUS 0.5 MG: 0.5 CAPSULE ORAL at 08:11

## 2018-11-18 RX ADMIN — ASPIRIN 81 MG CHEWABLE TABLET 81 MG: 81 TABLET CHEWABLE at 09:11

## 2018-11-18 RX ADMIN — INSULIN ASPART 8 UNITS: 100 INJECTION, SOLUTION INTRAVENOUS; SUBCUTANEOUS at 08:11

## 2018-11-18 RX ADMIN — MAGNESIUM OXIDE TAB 400 MG (241.3 MG ELEMENTAL MG) 800 MG: 400 (241.3 MG) TAB at 08:11

## 2018-11-18 RX ADMIN — URSODIOL 300 MG: 300 CAPSULE ORAL at 08:11

## 2018-11-18 RX ADMIN — INSULIN ASPART 10 UNITS: 100 INJECTION, SOLUTION INTRAVENOUS; SUBCUTANEOUS at 01:11

## 2018-11-18 RX ADMIN — PANTOPRAZOLE SODIUM 40 MG: 40 TABLET, DELAYED RELEASE ORAL at 09:11

## 2018-11-18 RX ADMIN — POLYETHYLENE GLYCOL 3350 17 G: 17 POWDER, FOR SOLUTION ORAL at 09:11

## 2018-11-18 RX ADMIN — MIDODRINE HYDROCHLORIDE 15 MG: 5 TABLET ORAL at 08:11

## 2018-11-18 RX ADMIN — MAGNESIUM OXIDE TAB 400 MG (241.3 MG ELEMENTAL MG) 800 MG: 400 (241.3 MG) TAB at 09:11

## 2018-11-18 RX ADMIN — MYCOPHENOLATE MOFETIL 500 MG: 250 CAPSULE ORAL at 08:11

## 2018-11-18 RX ADMIN — INSULIN ASPART 2 UNITS: 100 INJECTION, SOLUTION INTRAVENOUS; SUBCUTANEOUS at 01:11

## 2018-11-18 RX ADMIN — BISACODYL 10 MG: 5 TABLET, COATED ORAL at 08:11

## 2018-11-18 NOTE — CARE UPDATE
BG goal 140-180.     continue Novolog to 6-8 units with meals given steroid taper; appetite remains variable each day depending on how she is feeling.  Prandial excursions noted yesterday. If BG trends up today will increase novolog to 8-10 units with meals.   Low dose correction scale given kidney function.  BG monitoring AC/HS        ** Please call Endocrine for any BG related issues **

## 2018-11-18 NOTE — ASSESSMENT & PLAN NOTE
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   - POD 1 US: increased arterial resistive indices, suggestive of edema vs acute rejection vs congestion.  - Repeat US 10/9 with continued elevation of RIs.  - LFTs stable.  - T bili trending down.   - Repeat liver US (10/11): elevated RIs.  - Liver US 11/6 to assess for ascites. No arterial flow within the liver allograft concerning for interval thrombosis/occlusion of the arterial system. Severe ascites seen.  - Liver US 11/8: arterial flow seen w/ elevated RIs   - LFTs remain stable. No CTA at this time due to EVA and normal liver function.   - Liver US 11/15 showed "abnormally decreased main hepatic artery peak systolic velocity with slow arterial flow and abnormal tardus parvus waveforms intrahepatically."   -Vascular surgery consulted  -No intervention at this time. Will repeat US Monday and reassess   -Middle chevron incision opened with purulent drainage. Culture showing gram negative rods.  -CT abd/pelvis 11/17- no signs of infection        "

## 2018-11-18 NOTE — ASSESSMENT & PLAN NOTE
- Valcyte for CMV prophylaxis--> holding (10/31) for leukopenia. Pt will need weekly CMV PCR while discontinued.  - CMV PCR noted to be positive at 129. .  - CMV PCR 11/8: 105  - CMV PCR 11/15 with 89 copies  - Bactrim for PCP prophylaxis ([facility]).--> holding (10/31) for leukopenia.   - Start Atovaquone 11/6.  - Fluconazole for fungal prophylaxis (first dose 10/6). D/c Fluc 11/16 bc pt has been on it for > 30 days post take back.

## 2018-11-18 NOTE — PROGRESS NOTES
[facility]  Liver Transplant  Progress Note    Patient Name: Scarlett Reddy  MRN: [id number]  Admission Date: 10/1/2018  Hospital Length of Stay: 47 days  Code Status: Full Code  Primary Care Provider: Primary Doctor No  Post-Operative Day: 44    ORGAN:   LIVER  Disease Etiology: Cirrhosis: Type B and D  Donor Type:    - Brain Death  CDC High Risk:   No  Donor CMV Status:   Donor CMV Status: Positive  Donor HBcAB:   Negative  Donor HCV Status:   Negative  Donor HBV SETH: Negative  Donor HCV SETH: Negative  Whole or Partial: Whole Liver  Biliary Anastomosis: End to End  Arterial Anatomy: Replaced Left Hepatic and Right Hepatic  Subjective:     History of Present Illness:  Ms. Reddy is a 69 y/o female with PMH of ESLD secondary Hep B and D.  Listed for liver transplant with MELD 23.  Paracentesis 10/1 with 5L removed, no fluid sent for analysis.  Morning labs significant for hyponatremia, Na 119.  Pt admitted to LTS for sodium monitoring.        Hospital Course:  Hospital Course: 69 y/o F h/o HBV/delta cirrhosis s/p DBDLT 10/5/18 (CMV D+/R+, steroid induction, MMF/tacro/pred maintenance) with take back on 10/6 2/2 hyperbilirubinema and bilious VALORIE drainage, no leak identified. Post-op course c/b hypercapneic and hypoxic respiratory failure and was reintubated though quickly extubated now doing well. Liver bx (10/6) sig for Zone 3 hemorrhage and collapse, in addition to cholestasis. Transferred from ICU on POD #4. ID consulted to comment on positive diphtheroid culture from ascitic fluid (likely skin colonization) as well as ESBL Klebsiella pneumoniae in urine culture (10/4).  Pt asymptomatic and cell count from ascitic fluid unremarkable. Per ID recs, no need to treat diphtheroids or asymptomatic ESBL Kleb pneumo bacteriuria though pt has had 6 days of therapy which would cover these organisms. Mild peritransplant ascites- repeat US 10/6 with increased arterial resistive indices. Repeat US 10/9  "w/ continued elevation in RIs and fluid collections. LFTs trending down nicely.     Acute gradual worsening of renal function s/p OLTx. Creatinine on arrival 0.8 and has been up trending. Nephrology consulted for post-op EVA. Cr cont to trend up but remains to have good UOP. Had stable response to lasiz diuresis.  BUN elevated requiring multiple rounds of dialysis (10/15, 10/17, 10/20, 10/31). No significant improvements in kidney function noted.   In addition, pt H/H cont to fluctuate requiring multiple blood transfusions (10/14, 10/16, 10/18, 10/19, 10/22, 10/27, 10/29).  Pt reported dark stools 10/15 but color has normalized. FOBT+. EGD 10/18 consistent with ulcerated mucosa in duodenum s/p coagulation. Concern for H. Pylori. Started Amoxicillin/levofloxacin (10/19, complete 11/2)). Remains on Protonix 40 mg BID. Biopsy negative for infection and CMV. Will cont close monitoring H/H, transfuse as needed with goal Hb > 7.0. Of note, Urine cx + Klebsiella pneumoniae (10/13). Received 3 day course of ciprofloxacin, dc'd 10/19.   On 10/19 pm pt c/o crushing chest pain. Troponin 0.094, likely 2/2 ischemic stress. EKG NSR.   Liver US 11/6 showed no arterial flow. Paracentesis 11/7 with 8.9 L removed. Wbc 39, segs 16%. Gram stain no organisms seen + no WBC. Pt feels better since having fluid removed but she continues to have worsening abdominal distention & LE edema.Repeat Liver US 11/8 showed arterial flow with increased RIs.Paracentesis 11/12 with 8.6L removed (WBC 34, segs 25%).   11/15 liver u/s showed abnormally decreased main hepatic artery peak systolic velocity with slow arterial flow and abnormal tardus parvus waveforms intrahepatically." Consulted vascular surgery.    Interval history: No acute events overnight. Pt with purulent drainage from middle chevron incision line with minimal erythema of surrounding tissue. No pain vocalized. Packed the opening and covered with gauze. Wound care consulted. Drainage from " incision site with gram negative rods. No intervention at this time. CT abd/pelvis 11/17 with no signs of infection. Remains afebrile. VSS. Pt still has significant abdominal distention and LE edema. However, renal fx continues to show improvement with Cr down to 2.2 this am. Adequate UOP. Will continue Lasix 80 mg BID and monitor. WBC improving. Cellcept restarted at 500 mg bid on 11/6. Most recent CMV PCR 11/15 with 87 copies. Continue to hold Valcyte. Monitor levels with weekly CMV PCR. H/H decreased (7.7/23.4). Cont to monitor. Heparin dc'd 11/17. Pt instructed to ambulate 2-3x/day. Liver US 11/15 with decreased velocity and new tardus parvus waveforms. Transplant team has spoken with vascular surgery. Will defer plans for intervention at this time due to improving kidney function. Repeating US on Monday. Cont to monitor.     Scheduled Meds:   aspirin  81 mg Oral Daily    atovaquone  1,500 mg Oral Daily    bisacodyl  10 mg Oral QHS    docusate sodium  100 mg Oral Daily    entecavir  0.5 mg Oral Q72H    furosemide  80 mg Oral BID    insulin aspart U-100  6-8 Units Subcutaneous TIDWM    magnesium oxide  800 mg Oral BID    midodrine  15 mg Oral TID    mycophenolate  500 mg Oral BID    pantoprazole  40 mg Oral BID    polyethylene glycol  17 g Oral Daily    predniSONE  10 mg Oral Daily    Followed by    [START ON 11/24/2018] predniSONE  5 mg Oral Daily    Followed by    [START ON 12/1/2018] predniSONE  2.5 mg Oral Daily    tacrolimus  0.5 mg Oral BID    ursodiol  300 mg Oral BID     Continuous Infusions:  PRN Meds:acetaminophen, bisacodyl, calcium carbonate, dextrose 50%, dextrose 50%, glucagon (human recombinant), glucose, glucose, insulin aspart U-100, ondansetron, ondansetron, polyethylene glycol, sodium chloride 0.9%, traMADol    Review of Systems   Constitutional: Positive for activity change, appetite change (decreased) and fatigue. Negative for chills and fever.   HENT: Negative for  congestion, facial swelling and trouble swallowing.    Eyes: Negative for pain, discharge and visual disturbance.   Respiratory: Negative for cough, chest tightness, shortness of breath and wheezing.    Cardiovascular: Positive for leg swelling. Negative for chest pain and palpitations.   Gastrointestinal: Positive for abdominal distention and abdominal pain. Negative for constipation, diarrhea, nausea and vomiting.   Endocrine: Negative.    Genitourinary: Negative for decreased urine volume, difficulty urinating and dysuria.   Musculoskeletal: Negative for arthralgias, back pain, neck pain and neck stiffness.   Skin: Positive for wound.   Allergic/Immunologic: Positive for immunocompromised state.   Neurological: Positive for weakness. Negative for tremors and headaches.   Hematological: Bruises/bleeds easily.   Psychiatric/Behavioral: Negative for confusion and sleep disturbance.     Objective:     Vital Signs (Most Recent):  Temp: 98.5 °F (36.9 °C) (11/18/18 1135)  Pulse: 82 (11/18/18 0800)  Resp: 10 (11/18/18 0800)  BP: 117/70 (11/18/18 0800)  SpO2: 99 % (11/18/18 0800) Vital Signs (24h Range):  Temp:  [98.3 °F (36.8 °C)-98.5 °F (36.9 °C)] 98.5 °F (36.9 °C)  Pulse:  [77-93] 82  Resp:  [10-16] 10  SpO2:  [96 %-100 %] 99 %  BP: (113-130)/(68-86) 117/70     Weight: 64.2 kg (141 lb 8.6 oz)  Body mass index is 25.07 kg/m².    Intake/Output - Last 3 Shifts       11/16 0700 - 11/17 0659 11/17 0700 - 11/18 0659 11/18 0700 - 11/19 0659    P.O. 360 860 360    Total Intake(mL/kg) 360 (5.7) 860 (13.4) 360 (5.6)    Urine (mL/kg/hr) 700 (0.5) 1850 (1.2)     Stool 0 0     Total Output 700 1850     Net -340 -990 +360           Urine Occurrence  1 x     Stool Occurrence 0 x 1 x           Physical Exam   Constitutional: She is oriented to person, place, and time. She appears well-developed. No distress.   Temporal and distal extremity muscle wasting   HENT:   Head: Normocephalic and atraumatic.   Eyes: EOM are normal. No scleral  icterus.   Neck: Normal range of motion.   Cardiovascular: Normal rate, regular rhythm and normal heart sounds.   No murmur heard.  Pulmonary/Chest: Effort normal and breath sounds normal. No respiratory distress. She has no wheezes.   Abdominal: Soft. Bowel sounds are normal. She exhibits distension (slowly improving). There is tenderness. There is no guarding.   Purulent drainage expressed from middle chevron incision. Packing in place.   Minimal tissue erythema  RLQ VAOLRIE drain sites c/d/i with sutures   Musculoskeletal: Normal range of motion. She exhibits edema (2+ BLE, + generalized).   Neurological: She is alert and oriented to person, place, and time.   Skin: Skin is warm. She is not diaphoretic.   Psychiatric: She has a normal mood and affect.   Nursing note and vitals reviewed.      Laboratory:  Immunosuppressants         Stop Route Frequency     tacrolimus capsule 0.5 mg      -- Oral 2 times daily     mycophenolate capsule 500 mg      -- Oral 2 times daily        CBC:   Recent Labs   Lab 11/18/18  0624   WBC 5.28   RBC 2.63*   HGB 7.7*   HCT 23.4*   *   MCV 89   MCH 29.3   MCHC 32.9     CMP:   Recent Labs   Lab 11/18/18  0624   *   CALCIUM 8.4*   ALBUMIN 2.5*   PROT 4.6*   *   K 4.0   CO2 25   CL 92*   BUN 97*   CREATININE 2.2*   ALKPHOS 115   ALT 18   AST 18   BILITOT 1.2*     Labs within the past 24 hours have been reviewed.    Diagnostic Results:  I have personally reviewed all pertinent imaging studies.    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   - POD 1 US: increased arterial resistive indices, suggestive of edema vs acute rejection vs congestion.  - Repeat US 10/9 with continued elevation of RIs.  - LFTs stable.  - T bili trending down.   - Repeat liver US (10/11): elevated RIs.  - Liver US 11/6 to assess  "for ascites. No arterial flow within the liver allograft concerning for interval thrombosis/occlusion of the arterial system. Severe ascites seen.  - Liver US 11/8: arterial flow seen w/ elevated RIs   - LFTs remain stable. No CTA at this time due to EVA and normal liver function.   - Liver US 11/15 showed "abnormally decreased main hepatic artery peak systolic velocity with slow arterial flow and abnormal tardus parvus waveforms intrahepatically."   -Vascular surgery consulted  -No intervention at this time. Will repeat US Monday and reassess   -Middle chevron incision opened with purulent drainage. Culture showing gram negative rods.  -CT abd/pelvis 11/17- no signs of infection           Delayed surgical wound healing    Middle chevron incision opened 11/16 with purulent drainage  Packed with Aquacel ag at this time.   Fluid cultured- positive for gram neg rods. No intervention at this time   Wound care consulted. Appreciate recs          Other drug-induced pancytopenia    - Decreased plt, wbc, h/h  - See "leukopenia"       Thrombocytopenia, unspecified    - Improving.  - Monitor.       Alteration in skin integrity    Monitor.       Leukopenia    - Stopped Cellcept, Bactrim, and Valcyte 10/31 2/2 leukopenia  - Started Atovaquone 11/6.   - CMV PCR noted to be positive at 129. Will hold off on treatment with valcyte at this time.  - WBC improving   - Continue to monitor.       Azotemia    - CRRT 10/31 for uremic toxin clearance.           UTI (urinary tract infection)    - Urine Cx 10/15 w/ Klebsiella pneumonia.  - Started Ciprofloxacin (10/17-10/19).   - Started on amoxicillin/levo for H. Pylori (completed abx 11/2).  - Pt asymptomatic at this time.       Acute blood loss anemia    - H&H cont to fluctuate.   - FOBT +.  - EGD 10/18 - showed diffuse bleeding with ulcerated duodenal mucosa.  - EGD 11/5 - improvement seen, no active bleeding.  - 1 unit PRBCs transfused 10/27 and 11/3.   - H&H with good response.   - " "Resume heparin sq injections 11/6.   - Decrease heparin sq from TID to BID 11/15   -Heparin dc'd 11/17  - See "anemia requiring transfusions."  - Continue to monitor.             Post LT - Acute renal failure    - Creatinine on arrival 0.8 and trended up.  - Nephrology consulted.  - Renal US (10/11): no hydronephrosis.  - Echo 10/12: diastolic dysfunction, likely 2/2 to ARF.  - CRRT 10/14; SLED 10/17, 10/20. --> no improvements noted in kidney fxn.  - 24 hr urine creatine collection: CrCl of 7.   - CRRT 10/31 with 1L removed.   - See "hypervolemia associated with renal insufficiency"     Hypervolemia associated with renal insufficiency    - CRRT 10/14, 10/17, 10/20, 10/31.  - CXR 10/30: abundant fluid in b/l pleural space.  - Paracentesis 11/12: 8.6L removed   - Continue 80 mg Lasix BID.  - Monitor daily weights.       EVA (acute kidney injury)    - No plans for RRT in near future, per nephrology  - Trialysis line removed 11/13  - Nephrology involved. Appreciate recs.  - Renal fx showing improvement   - Monitor.     Metabolic acidosis    - CRRT 10/14.  - SLED 10/17, 10/20, 10/31.         At risk for opportunistic infections    - Valcyte for CMV prophylaxis--> holding (10/31) for leukopenia. Pt will need weekly CMV PCR while discontinued.  - CMV PCR noted to be positive at 129. .  - CMV PCR 11/8: 105  - CMV PCR 11/15 with 89 copies  - Bactrim for PCP prophylaxis ([facility]).--> holding (10/31) for leukopenia.   - Start Atovaquone 11/6.  - Fluconazole for fungal prophylaxis (first dose 10/6). D/c Fluc 11/16 bc pt has been on it for > 30 days post take back.       Long-term use of immunosuppressant medication    - Prograf: stopped 10/29. Resumed 11/2.  --> Will monitor for signs of toxic side effects, check daily troughs, and change meds accordingly.  - Prednisone: stopped 10/29. Started on Hydrocortisone. pred taper resumed.  - Cellcept: stopped 10/31 for leukopenia. Restarted Cellcept 500 mg bid, 11/6.         Prophylactic " immunotherapy    - See long term use immunosuppresion.       Ascites    - Paracentesis 10/1, 5L removed, no fluid sent for analysis.  - Pt remains volume overloaded.   - Liver US 11/6 with severe ascites.   - Paracentesis 11/7 with 8.9 L removed. Wbc 39, segs 16%. Gram stain - no organisms seen and no WBC. Cultures NGTD.  - Paracentesis 11/12: 8.6 L removed. 34 wbc, 25% segs, 33 lymphs, 42 monocytes/macrophages. Cultures NGTD     Weakness    - See physical deconditioning.       Physical deconditioning    - PT/OT consulted.  - Recommend home health PT and rolling walker at discharge (orders placed 11/6).       Hyponatremia    - Na 119 on admit.  - Na was improving post transplant w HD.  - Na stable at this time.              Hypotension    - continue Midodrine 15 mg TID. Monitor       Anemia requiring transfusions    - Fluctuating H&H.    - Transfusions: 10/14; 10/16; 10/18; 10/19; 10/22; 10/27; 10/29, 10/31  - FOBT +  - LDH elevated, Hapto < 10.  - EGD 10/18: ulcerated duodenal mucosa.   - Consulted GI about this ongoing issue and further need of another EGD.  - EGD 11/5 unremarkable.   - H/H decreased again 11/13: 7.2/22.6 from 8.5/26.5  - Repeat H/H stable  - Iron studies 11/13:  95, TIBC 107, sat iron 89, transferrin 72, ferritin pending  - Hemolytic anemia workup 11/13: Haptoglobin 132, retic 3.9   - H & H stable, no need for transfusion at this time  - Will continue to monitor closely.           Severe malnutrition    - Dietary consulted.   - Temporal and distal extremity muscle wasting and edema.  - Encourage supplements and to increase nutritional intake.  - Per nephrology, pt to be on low protein diet.   - Prealbumin reviewed.      Type 2 diabetes mellitus with diabetic polyneuropathy, with long-term current use of insulin    - Continue home regimen.  - Endocrine consulted.  - Pt off insulin drip at this time.  Apprec endo recs.        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  - Tx w/ Entecavir- dose changed to q72 hours given renal function.             VTE Risk Mitigation (From admission, onward)        Ordered     IP VTE HIGH RISK PATIENT  Once      11/05/18 1145     Place sequential compression device  Until discontinued      10/05/18 0709          The patients clinical status was discussed at multidisplinary rounds, involving transplant surgery, transplant medicine, pharmacy, nursing, nutrition, and social work    Discharge Planning:  No Patient Care Coordination Note on file.      Jihan Soares PA-C  Liver Transplant  [facility]

## 2018-11-18 NOTE — ASSESSMENT & PLAN NOTE
BG goal 140-180.     Continue Novolog 8 units with meals.   Low dose correction scale given kidney function.  BG monitoring AC/HS    ADDENDUM: change to novolog 5 units with meals if BG <120 (or call endocrine) or 10 units with meals if >120.     Start self/caregiver administration of insulin to review for home.     Discharge planning:  TBD

## 2018-11-18 NOTE — ASSESSMENT & PLAN NOTE
Avoid insulin stacking and hypoglycemia.  Lab Results   Component Value Date    CREATININE 2.2 (H) 11/18/2018

## 2018-11-18 NOTE — SUBJECTIVE & OBJECTIVE
Scheduled Meds:   aspirin  81 mg Oral Daily    atovaquone  1,500 mg Oral Daily    bisacodyl  10 mg Oral QHS    docusate sodium  100 mg Oral Daily    entecavir  0.5 mg Oral Q72H    furosemide  80 mg Oral BID    insulin aspart U-100  6-8 Units Subcutaneous TIDWM    magnesium oxide  800 mg Oral BID    midodrine  15 mg Oral TID    mycophenolate  500 mg Oral BID    pantoprazole  40 mg Oral BID    polyethylene glycol  17 g Oral Daily    predniSONE  10 mg Oral Daily    Followed by    [START ON 11/24/2018] predniSONE  5 mg Oral Daily    Followed by    [START ON 12/1/2018] predniSONE  2.5 mg Oral Daily    tacrolimus  0.5 mg Oral BID    ursodiol  300 mg Oral BID     Continuous Infusions:  PRN Meds:acetaminophen, bisacodyl, calcium carbonate, dextrose 50%, dextrose 50%, glucagon (human recombinant), glucose, glucose, insulin aspart U-100, ondansetron, ondansetron, polyethylene glycol, sodium chloride 0.9%, traMADol    Review of Systems   Constitutional: Positive for activity change, appetite change (decreased) and fatigue. Negative for chills and fever.   HENT: Negative for congestion, facial swelling and trouble swallowing.    Eyes: Negative for pain, discharge and visual disturbance.   Respiratory: Negative for cough, chest tightness, shortness of breath and wheezing.    Cardiovascular: Positive for leg swelling. Negative for chest pain and palpitations.   Gastrointestinal: Positive for abdominal distention and abdominal pain. Negative for constipation, diarrhea, nausea and vomiting.   Endocrine: Negative.    Genitourinary: Negative for decreased urine volume, difficulty urinating and dysuria.   Musculoskeletal: Negative for arthralgias, back pain, neck pain and neck stiffness.   Skin: Positive for wound.   Allergic/Immunologic: Positive for immunocompromised state.   Neurological: Positive for weakness. Negative for tremors and headaches.   Hematological: Bruises/bleeds easily.   Psychiatric/Behavioral:  Negative for confusion and sleep disturbance.     Objective:     Vital Signs (Most Recent):  Temp: 98.5 °F (36.9 °C) (11/18/18 1135)  Pulse: 82 (11/18/18 0800)  Resp: 10 (11/18/18 0800)  BP: 117/70 (11/18/18 0800)  SpO2: 99 % (11/18/18 0800) Vital Signs (24h Range):  Temp:  [98.3 °F (36.8 °C)-98.5 °F (36.9 °C)] 98.5 °F (36.9 °C)  Pulse:  [77-93] 82  Resp:  [10-16] 10  SpO2:  [96 %-100 %] 99 %  BP: (113-130)/(68-86) 117/70     Weight: 64.2 kg (141 lb 8.6 oz)  Body mass index is 25.07 kg/m².    Intake/Output - Last 3 Shifts       11/16 0700 - 11/17 0659 11/17 0700 - 11/18 0659 11/18 0700 - 11/19 0659    P.O. 360 860 360    Total Intake(mL/kg) 360 (5.7) 860 (13.4) 360 (5.6)    Urine (mL/kg/hr) 700 (0.5) 1850 (1.2)     Stool 0 0     Total Output 700 1850     Net -340 -990 +360           Urine Occurrence  1 x     Stool Occurrence 0 x 1 x           Physical Exam   Constitutional: She is oriented to person, place, and time. She appears well-developed. No distress.   Temporal and distal extremity muscle wasting   HENT:   Head: Normocephalic and atraumatic.   Eyes: EOM are normal. No scleral icterus.   Neck: Normal range of motion.   Cardiovascular: Normal rate, regular rhythm and normal heart sounds.   No murmur heard.  Pulmonary/Chest: Effort normal and breath sounds normal. No respiratory distress. She has no wheezes.   Abdominal: Soft. Bowel sounds are normal. She exhibits distension (slowly improving). There is tenderness. There is no guarding.   Purulent drainage expressed from middle chevron incision. Packing in place.   Minimal tissue erythema  RLQ VALORIE drain sites c/d/i with sutures   Musculoskeletal: Normal range of motion. She exhibits edema (2+ BLE, + generalized).   Neurological: She is alert and oriented to person, place, and time.   Skin: Skin is warm. She is not diaphoretic.   Psychiatric: She has a normal mood and affect.   Nursing note and vitals reviewed.      Laboratory:  Immunosuppressants         Stop  Route Frequency     tacrolimus capsule 0.5 mg      -- Oral 2 times daily     mycophenolate capsule 500 mg      -- Oral 2 times daily        CBC:   Recent Labs   Lab 11/18/18  0624   WBC 5.28   RBC 2.63*   HGB 7.7*   HCT 23.4*   *   MCV 89   MCH 29.3   MCHC 32.9     CMP:   Recent Labs   Lab 11/18/18  0624   *   CALCIUM 8.4*   ALBUMIN 2.5*   PROT 4.6*   *   K 4.0   CO2 25   CL 92*   BUN 97*   CREATININE 2.2*   ALKPHOS 115   ALT 18   AST 18   BILITOT 1.2*     Labs within the past 24 hours have been reviewed.    Diagnostic Results:  I have personally reviewed all pertinent imaging studies.

## 2018-11-18 NOTE — ASSESSMENT & PLAN NOTE
- CRRT 10/14, 10/17, 10/20, 10/31.  - CXR 10/30: abundant fluid in b/l pleural space.  - Paracentesis 11/12: 8.6L removed   - Continue 80 mg Lasix BID.  - Monitor daily weights.

## 2018-11-18 NOTE — ASSESSMENT & PLAN NOTE
Managed per primary team  Avoid hypoglycemia    Recent Labs   Lab 11/18/18  0624   ALT 18   AST 18   ALKPHOS 115   BILITOT 1.2*   PROT 4.6*   ALBUMIN 2.5*

## 2018-11-18 NOTE — PLAN OF CARE
Problem: Patient Care Overview  Goal: Plan of Care Review  Outcome: Ongoing (interventions implemented as appropriate)  Patient aaox4. Bed kept locked, lowest position with 2x side rails up while in bed. nonslip footwear when out of bed. Ambulates with standby assistance and use of walker. Family at bedside, attentive to patient's needs. Regular diet, eating encouraged. accuchecks ac/hs, insulin given as ordered. Abdomen distended and firm. Chevron with midline dressing cdi, changed today. +3 bilateral lower extremity edema, PO lasix given as ordered. Plan for ultrasound on Monday and angiogram Wednesday. Contact precautions maintained.

## 2018-11-18 NOTE — ASSESSMENT & PLAN NOTE
Middle chevron incision opened 11/16 with purulent drainage  Packed with Aquacel ag at this time.   Fluid cultured- positive for gram neg rods. No intervention at this time   Wound care consulted. Appreciate recs

## 2018-11-19 ENCOUNTER — ANESTHESIA EVENT (OUTPATIENT)
Dept: SURGERY | Facility: HOSPITAL | Age: 68
DRG: 005 | End: 2018-11-19
Payer: COMMERCIAL

## 2018-11-19 ENCOUNTER — ANESTHESIA (OUTPATIENT)
Dept: SURGERY | Facility: HOSPITAL | Age: 68
End: 2018-11-19

## 2018-11-19 ENCOUNTER — ANESTHESIA (OUTPATIENT)
Dept: SURGERY | Facility: HOSPITAL | Age: 68
DRG: 005 | End: 2018-11-19
Payer: COMMERCIAL

## 2018-11-19 LAB
ALBUMIN SERPL BCP-MCNC: 2.7 G/DL
ALP SERPL-CCNC: 123 U/L
ALT SERPL W/O P-5'-P-CCNC: 19 U/L
ANION GAP SERPL CALC-SCNC: 14 MMOL/L
AST SERPL-CCNC: 19 U/L
BACTERIA SPEC AEROBE CULT: NORMAL
BACTERIA SPEC ANAEROBE CULT: NORMAL
BASOPHILS # BLD AUTO: 0.02 K/UL
BASOPHILS NFR BLD: 0.3 %
BILIRUB SERPL-MCNC: 1.3 MG/DL
BUN SERPL-MCNC: 93 MG/DL
CALCIUM SERPL-MCNC: 8.7 MG/DL
CHLORIDE SERPL-SCNC: 92 MMOL/L
CO2 SERPL-SCNC: 25 MMOL/L
CREAT SERPL-MCNC: 2.1 MG/DL
DIFFERENTIAL METHOD: ABNORMAL
EOSINOPHIL # BLD AUTO: 0 K/UL
EOSINOPHIL NFR BLD: 0.3 %
ERYTHROCYTE [DISTWIDTH] IN BLOOD BY AUTOMATED COUNT: 17.2 %
EST. GFR  (AFRICAN AMERICAN): 27.1 ML/MIN/1.73 M^2
EST. GFR  (NON AFRICAN AMERICAN): 23.5 ML/MIN/1.73 M^2
GLUCOSE SERPL-MCNC: 162 MG/DL
HCT VFR BLD AUTO: 25.6 %
HGB BLD-MCNC: 8 G/DL
IMM GRANULOCYTES # BLD AUTO: 0.15 K/UL
IMM GRANULOCYTES NFR BLD AUTO: 2.6 %
LYMPHOCYTES # BLD AUTO: 0.2 K/UL
LYMPHOCYTES NFR BLD: 4.1 %
MAGNESIUM SERPL-MCNC: 2.1 MG/DL
MCH RBC QN AUTO: 28.7 PG
MCHC RBC AUTO-ENTMCNC: 31.3 G/DL
MCV RBC AUTO: 92 FL
MONOCYTES # BLD AUTO: 0.4 K/UL
MONOCYTES NFR BLD: 6 %
NEUTROPHILS # BLD AUTO: 5.1 K/UL
NEUTROPHILS NFR BLD: 86.7 %
NRBC BLD-RTO: 0 /100 WBC
PHOSPHATE SERPL-MCNC: 2.6 MG/DL
PLATELET # BLD AUTO: 141 K/UL
PMV BLD AUTO: 10.3 FL
POCT GLUCOSE: 175 MG/DL (ref 70–110)
POCT GLUCOSE: 180 MG/DL (ref 70–110)
POCT GLUCOSE: 204 MG/DL (ref 70–110)
POCT GLUCOSE: 225 MG/DL (ref 70–110)
POCT GLUCOSE: 270 MG/DL (ref 70–110)
POTASSIUM SERPL-SCNC: 3.6 MMOL/L
PROT SERPL-MCNC: 5.1 G/DL
RBC # BLD AUTO: 2.79 M/UL
SODIUM SERPL-SCNC: 131 MMOL/L
TACROLIMUS BLD-MCNC: 4 NG/ML
WBC # BLD AUTO: 5.83 K/UL

## 2018-11-19 PROCEDURE — 63600175 PHARM REV CODE 636 W HCPCS: Performed by: SURGERY

## 2018-11-19 PROCEDURE — 37000009 HC ANESTHESIA EA ADD 15 MINS: Performed by: SURGERY

## 2018-11-19 PROCEDURE — 36415 COLL VENOUS BLD VENIPUNCTURE: CPT

## 2018-11-19 PROCEDURE — 71000044 HC DOSC ROUTINE RECOVERY FIRST HOUR: Performed by: SURGERY

## 2018-11-19 PROCEDURE — 25000003 PHARM REV CODE 250: Performed by: TRANSPLANT SURGERY

## 2018-11-19 PROCEDURE — 80197 ASSAY OF TACROLIMUS: CPT

## 2018-11-19 PROCEDURE — C1894 INTRO/SHEATH, NON-LASER: HCPCS | Performed by: SURGERY

## 2018-11-19 PROCEDURE — 36000707: Performed by: SURGERY

## 2018-11-19 PROCEDURE — 83735 ASSAY OF MAGNESIUM: CPT

## 2018-11-19 PROCEDURE — 63600175 PHARM REV CODE 636 W HCPCS: Performed by: PHYSICIAN ASSISTANT

## 2018-11-19 PROCEDURE — 36000706: Performed by: SURGERY

## 2018-11-19 PROCEDURE — 25000003 PHARM REV CODE 250: Performed by: SURGERY

## 2018-11-19 PROCEDURE — 63600175 PHARM REV CODE 636 W HCPCS: Performed by: NURSE PRACTITIONER

## 2018-11-19 PROCEDURE — 36245 INS CATH ABD/L-EXT ART 1ST: CPT | Mod: ,,, | Performed by: SURGERY

## 2018-11-19 PROCEDURE — D9220A PRA ANESTHESIA: Mod: ,,, | Performed by: ANESTHESIOLOGY

## 2018-11-19 PROCEDURE — 99233 SBSQ HOSP IP/OBS HIGH 50: CPT | Mod: 24,,, | Performed by: PHYSICIAN ASSISTANT

## 2018-11-19 PROCEDURE — 37000008 HC ANESTHESIA 1ST 15 MINUTES: Performed by: SURGERY

## 2018-11-19 PROCEDURE — 71000015 HC POSTOP RECOV 1ST HR: Performed by: SURGERY

## 2018-11-19 PROCEDURE — C1887 CATHETER, GUIDING: HCPCS | Performed by: SURGERY

## 2018-11-19 PROCEDURE — 20600001 HC STEP DOWN PRIVATE ROOM

## 2018-11-19 PROCEDURE — 25500020 PHARM REV CODE 255: Performed by: SURGERY

## 2018-11-19 PROCEDURE — 99223 1ST HOSP IP/OBS HIGH 75: CPT | Mod: ,,, | Performed by: SURGERY

## 2018-11-19 PROCEDURE — 94664 DEMO&/EVAL PT USE INHALER: CPT

## 2018-11-19 PROCEDURE — 85025 COMPLETE CBC W/AUTO DIFF WBC: CPT

## 2018-11-19 PROCEDURE — 75726 ARTERY X-RAYS ABDOMEN: CPT | Mod: 26,,, | Performed by: SURGERY

## 2018-11-19 PROCEDURE — 25000003 PHARM REV CODE 250: Performed by: PHYSICIAN ASSISTANT

## 2018-11-19 PROCEDURE — 25000003 PHARM REV CODE 250: Performed by: NURSE PRACTITIONER

## 2018-11-19 PROCEDURE — 99233 SBSQ HOSP IP/OBS HIGH 50: CPT | Mod: ,,, | Performed by: INTERNAL MEDICINE

## 2018-11-19 PROCEDURE — 82962 GLUCOSE BLOOD TEST: CPT | Performed by: SURGERY

## 2018-11-19 PROCEDURE — 71000016 HC POSTOP RECOV ADDL HR: Performed by: SURGERY

## 2018-11-19 PROCEDURE — 80053 COMPREHEN METABOLIC PANEL: CPT

## 2018-11-19 PROCEDURE — 99900035 HC TECH TIME PER 15 MIN (STAT)

## 2018-11-19 PROCEDURE — C1769 GUIDE WIRE: HCPCS | Performed by: SURGERY

## 2018-11-19 PROCEDURE — 84100 ASSAY OF PHOSPHORUS: CPT

## 2018-11-19 RX ORDER — LIDOCAINE HCL/PF 100 MG/5ML
SYRINGE (ML) INTRAVENOUS
Status: DISCONTINUED | OUTPATIENT
Start: 2018-11-19 | End: 2018-11-19

## 2018-11-19 RX ORDER — IODIXANOL 320 MG/ML
INJECTION, SOLUTION INTRAVASCULAR
Status: DISCONTINUED | OUTPATIENT
Start: 2018-11-19 | End: 2018-11-19 | Stop reason: HOSPADM

## 2018-11-19 RX ORDER — TACROLIMUS 0.5 MG/1
0.5 CAPSULE ORAL 2 TIMES DAILY
Status: DISCONTINUED | OUTPATIENT
Start: 2018-11-20 | End: 2018-11-24

## 2018-11-19 RX ORDER — FENTANYL CITRATE 50 UG/ML
25 INJECTION, SOLUTION INTRAMUSCULAR; INTRAVENOUS EVERY 5 MIN PRN
Status: DISCONTINUED | OUTPATIENT
Start: 2018-11-19 | End: 2018-11-19 | Stop reason: HOSPADM

## 2018-11-19 RX ORDER — SODIUM CHLORIDE 0.9 % (FLUSH) 0.9 %
3 SYRINGE (ML) INJECTION
Status: DISCONTINUED | OUTPATIENT
Start: 2018-11-19 | End: 2018-11-19 | Stop reason: HOSPADM

## 2018-11-19 RX ORDER — CEFAZOLIN SODIUM 1 G/3ML
INJECTION, POWDER, FOR SOLUTION INTRAMUSCULAR; INTRAVENOUS
Status: DISCONTINUED | OUTPATIENT
Start: 2018-11-19 | End: 2018-11-19

## 2018-11-19 RX ORDER — HEPARIN SODIUM 1000 [USP'U]/ML
INJECTION, SOLUTION INTRAVENOUS; SUBCUTANEOUS
Status: DISCONTINUED | OUTPATIENT
Start: 2018-11-19 | End: 2018-11-19 | Stop reason: HOSPADM

## 2018-11-19 RX ORDER — PROPOFOL 10 MG/ML
INJECTION, EMULSION INTRAVENOUS CONTINUOUS PRN
Status: DISCONTINUED | OUTPATIENT
Start: 2018-11-19 | End: 2018-11-19

## 2018-11-19 RX ORDER — PROTAMINE SULFATE 10 MG/ML
INJECTION, SOLUTION INTRAVENOUS
Status: DISCONTINUED | OUTPATIENT
Start: 2018-11-19 | End: 2018-11-19

## 2018-11-19 RX ORDER — LIDOCAINE HYDROCHLORIDE 10 MG/ML
INJECTION, SOLUTION EPIDURAL; INFILTRATION; INTRACAUDAL; PERINEURAL
Status: DISCONTINUED | OUTPATIENT
Start: 2018-11-19 | End: 2018-11-19 | Stop reason: HOSPADM

## 2018-11-19 RX ORDER — FENTANYL CITRATE 50 UG/ML
INJECTION, SOLUTION INTRAMUSCULAR; INTRAVENOUS
Status: DISCONTINUED | OUTPATIENT
Start: 2018-11-19 | End: 2018-11-19

## 2018-11-19 RX ORDER — PROPOFOL 10 MG/ML
INJECTION, EMULSION INTRAVENOUS
Status: DISCONTINUED | OUTPATIENT
Start: 2018-11-19 | End: 2018-11-19

## 2018-11-19 RX ORDER — ONDANSETRON 2 MG/ML
4 INJECTION INTRAMUSCULAR; INTRAVENOUS ONCE AS NEEDED
Status: DISCONTINUED | OUTPATIENT
Start: 2018-11-19 | End: 2018-11-19 | Stop reason: HOSPADM

## 2018-11-19 RX ORDER — SODIUM CHLORIDE 9 MG/ML
INJECTION, SOLUTION INTRAVENOUS CONTINUOUS PRN
Status: DISCONTINUED | OUTPATIENT
Start: 2018-11-19 | End: 2018-11-19

## 2018-11-19 RX ORDER — INSULIN ASPART 100 [IU]/ML
8 INJECTION, SOLUTION INTRAVENOUS; SUBCUTANEOUS
Status: DISCONTINUED | OUTPATIENT
Start: 2018-11-19 | End: 2018-11-20

## 2018-11-19 RX ADMIN — PROPOFOL 35 MCG/KG/MIN: 10 INJECTION, EMULSION INTRAVENOUS at 02:11

## 2018-11-19 RX ADMIN — INSULIN ASPART 10 UNITS: 100 INJECTION, SOLUTION INTRAVENOUS; SUBCUTANEOUS at 07:11

## 2018-11-19 RX ADMIN — TACROLIMUS 0.5 MG: 0.5 CAPSULE ORAL at 10:11

## 2018-11-19 RX ADMIN — PROTAMINE SULFATE 20 MG: 10 INJECTION, SOLUTION INTRAVENOUS at 03:11

## 2018-11-19 RX ADMIN — BISACODYL 10 MG: 5 TABLET, COATED ORAL at 08:11

## 2018-11-19 RX ADMIN — CEFAZOLIN 2 G: 330 INJECTION, POWDER, FOR SOLUTION INTRAMUSCULAR; INTRAVENOUS at 02:11

## 2018-11-19 RX ADMIN — SODIUM CHLORIDE: 0.9 INJECTION, SOLUTION INTRAVENOUS at 01:11

## 2018-11-19 RX ADMIN — FENTANYL CITRATE 25 MCG: 50 INJECTION, SOLUTION INTRAMUSCULAR; INTRAVENOUS at 03:11

## 2018-11-19 RX ADMIN — FUROSEMIDE 80 MG: 40 TABLET ORAL at 06:11

## 2018-11-19 RX ADMIN — MAGNESIUM OXIDE TAB 400 MG (241.3 MG ELEMENTAL MG) 800 MG: 400 (241.3 MG) TAB at 08:11

## 2018-11-19 RX ADMIN — MYCOPHENOLATE MOFETIL 500 MG: 250 CAPSULE ORAL at 08:11

## 2018-11-19 RX ADMIN — PANTOPRAZOLE SODIUM 40 MG: 40 TABLET, DELAYED RELEASE ORAL at 08:11

## 2018-11-19 RX ADMIN — FENTANYL CITRATE 25 MCG: 50 INJECTION, SOLUTION INTRAMUSCULAR; INTRAVENOUS at 02:11

## 2018-11-19 RX ADMIN — MIDODRINE HYDROCHLORIDE 15 MG: 5 TABLET ORAL at 08:11

## 2018-11-19 RX ADMIN — INSULIN ASPART 1 UNITS: 100 INJECTION, SOLUTION INTRAVENOUS; SUBCUTANEOUS at 10:11

## 2018-11-19 RX ADMIN — POLYETHYLENE GLYCOL 3350 17 G: 17 POWDER, FOR SOLUTION ORAL at 10:11

## 2018-11-19 RX ADMIN — INSULIN ASPART 8 UNITS: 100 INJECTION, SOLUTION INTRAVENOUS; SUBCUTANEOUS at 06:11

## 2018-11-19 RX ADMIN — MYCOPHENOLATE MOFETIL 500 MG: 250 CAPSULE ORAL at 10:11

## 2018-11-19 RX ADMIN — FUROSEMIDE 80 MG: 40 TABLET ORAL at 10:11

## 2018-11-19 RX ADMIN — ASPIRIN 81 MG CHEWABLE TABLET 81 MG: 81 TABLET CHEWABLE at 10:11

## 2018-11-19 RX ADMIN — PREDNISONE 10 MG: 10 TABLET ORAL at 10:11

## 2018-11-19 RX ADMIN — LIDOCAINE HYDROCHLORIDE 75 MG: 20 INJECTION, SOLUTION INTRAVENOUS at 02:11

## 2018-11-19 RX ADMIN — PROPOFOL 50 MG: 10 INJECTION, EMULSION INTRAVENOUS at 02:11

## 2018-11-19 RX ADMIN — PANTOPRAZOLE SODIUM 40 MG: 40 TABLET, DELAYED RELEASE ORAL at 10:11

## 2018-11-19 RX ADMIN — DOCUSATE SODIUM 100 MG: 100 CAPSULE, LIQUID FILLED ORAL at 10:11

## 2018-11-19 RX ADMIN — ONDANSETRON 4 MG: 2 INJECTION INTRAMUSCULAR; INTRAVENOUS at 03:11

## 2018-11-19 RX ADMIN — PROTAMINE SULFATE 25 MG: 10 INJECTION, SOLUTION INTRAVENOUS at 03:11

## 2018-11-19 RX ADMIN — URSODIOL 300 MG: 300 CAPSULE ORAL at 10:11

## 2018-11-19 RX ADMIN — INSULIN ASPART 2 UNITS: 100 INJECTION, SOLUTION INTRAVENOUS; SUBCUTANEOUS at 06:11

## 2018-11-19 RX ADMIN — URSODIOL 300 MG: 300 CAPSULE ORAL at 08:11

## 2018-11-19 RX ADMIN — MAGNESIUM OXIDE TAB 400 MG (241.3 MG ELEMENTAL MG) 800 MG: 400 (241.3 MG) TAB at 10:11

## 2018-11-19 RX ADMIN — HEPARIN SODIUM 6000 UNITS: 1000 INJECTION, SOLUTION INTRAVENOUS; SUBCUTANEOUS at 02:11

## 2018-11-19 RX ADMIN — ATOVAQUONE 1500 MG: 750 SUSPENSION ORAL at 10:11

## 2018-11-19 RX ADMIN — PROTAMINE SULFATE 10 MG: 10 INJECTION, SOLUTION INTRAVENOUS at 03:11

## 2018-11-19 NOTE — PROGRESS NOTES
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 Post-op course c/b hypercapneic and hypoxic respiratory failure and was reintubated though quickly extubated now doing well. Liver bx (10/6) sig for Zone 3 hemorrhage and collapse, in addition to cholestasis. Transferred from ICU on POD #4. ID consulted to comment on positive diphtheroid culture from ascitic fluid (likely skin colonization) as well as ESBL Klebsiella pneumoniae in urine culture (10/4).  Pt asymptomatic and cell count from ascitic fluid unremarkable. Per ID recs, no need to treat diphtheroids or asymptomatic ESBL Kleb pneumo bacteriuria though pt has had 6 days of therapy which would cover these organisms. Mild peritransplant ascites- repeat US 10/6 with increased arterial resistive indices. Repeat US 10/9 w/ continued elevation in RIs and fluid collections. LFTs trending down nicely.      Acute gradual worsening of renal function s/p OLTx.Creatinine on arrival 0.8 and has been up trending. Nephrology consulted for post-op EVA. Cr cont to trend up but remains to have good UOP. Had stable response to lasiz diuresis.  BUN elevated requiring multiple rounds of dialysis (10/15, 10/17, 10/20, 10/31). No significant improvements in kidney function noted.   In addition, pt H/H cont to fluctuate requiring multiple blood transfusions (10/14, 10/16, 10/18, 10/19, 10/22, 10/27, 10/29).  Pt reported dark stools 10/15 but color has normalized. FOBT+. EGD 10/18 consistent with ulcerated mucosa in duodenum s/p  coagulation. Concern for H. Pylori. Started Amoxicillin/levofloxacin (10/19, complete 11/2)). Remains on Protonix 40 mg BID. Biopsy negative for infection and CMV. Will cont close monitoring H/H, transfuse as needed with goal Hb > 7.0.  Of note, Urine cx + Klebsiella pneumoniae (10/13). Received 3 day course of ciprofloxacin, dc'd 10/19.   On 10/19 pm pt c/o crushing chest pain. Troponin 0.094, likely 2/2 ischemic stress. EKG NSR.      11/5/18 - no acute events overnight. Pt reports feeling well this am. One unit of PRBCs given 11/3 with good response. Cr level noted with only minimal increases. Lasix placed on hold per nephrology recs. Will plan to hydrate today w/ albumin. EGD 11/5 unimpressive for overt bleed. Pt remains on protonix 40 BID. Denies melena. Cont to hold heparin. WBC cont to decrease. Immunosuppression managed with hydrocortisone 50 (cont to hold Cellcept, bactrim, valcyte). Prograf now restarted and transitioned IV steroids to PO prednisone on 11/3. CMV PCR noted with 129. Cont to monitor.     Interval History:   No acute events overnight. UOP 1050 ml UOP overnight fluid balance -450 ml yesterday. sCr very slowly improving. Heptic angiogram was h elp todau Doppler US of liver today. She feels well.     Review of patient's allergies indicates:  No Known Allergies  Current Facility-Administered Medications   Medication Frequency    acetaminophen tablet 650 mg Q6H PRN    aspirin chewable tablet 81 mg Daily    atovaquone suspension 1,500 mg Daily    bisacodyl EC tablet 10 mg QHS    bisacodyl suppository 10 mg Daily PRN    calcium carbonate 200 mg calcium (500 mg) chewable tablet 500 mg TID PRN    dextrose 50% injection 12.5 g PRN    dextrose 50% injection 25 g PRN    docusate sodium capsule 100 mg Daily    entecavir tablet 0.5 mg Q72H    fentaNYL injection 25 mcg Q5 Min PRN    furosemide tablet 80 mg BID    glucagon (human recombinant) injection 1 mg PRN    glucose chewable tablet 16 g  PRN    glucose chewable tablet 24 g PRN    insulin aspart U-100 pen 0-5 Units QID (AC + HS) PRN    insulin aspart U-100 pen 8 Units TIDWM    magnesium oxide tablet 800 mg BID    midodrine tablet 15 mg TID    mycophenolate capsule 500 mg BID    ondansetron disintegrating tablet 8 mg Q8H PRN    ondansetron injection 4 mg Q8H PRN    ondansetron injection 4 mg Once PRN    pantoprazole EC tablet 40 mg BID    polyethylene glycol packet 17 g Daily PRN    polyethylene glycol packet 17 g Daily    predniSONE tablet 10 mg Daily    Followed by    [START ON 11/24/2018] predniSONE tablet 5 mg Daily    Followed by    [START ON 12/1/2018] predniSONE tablet 2.5 mg Daily    sodium chloride 0.9% flush 3 mL PRN    sodium chloride 0.9% flush 3 mL PRN    [START ON 11/20/2018] tacrolimus capsule 0.5 mg BID    traMADol tablet 50 mg Q6H PRN    ursodiol capsule 300 mg BID       Objective:     Vital Signs (Most Recent):  Temp: 96.4 °F (35.8 °C) (11/19/18 1545)  Pulse: 90 (11/19/18 1615)  Resp: 20 (11/19/18 1600)  BP: (!) 130/56 (11/19/18 1615)  SpO2: 100 % (11/19/18 1615)  O2 Device (Oxygen Therapy): room air (11/19/18 0752) Vital Signs (24h Range):  Temp:  [96.4 °F (35.8 °C)-97.9 °F (36.6 °C)] 96.4 °F (35.8 °C)  Pulse:  [] 90  Resp:  [10-20] 20  SpO2:  [96 %-100 %] 100 %  BP: (110-140)/(56-75) 130/56     Weight: 63.2 kg (139 lb 5.3 oz) (11/19/18 0647)  Body mass index is 24.68 kg/m².  Body surface area is 1.68 meters squared.    I/O last 3 completed shifts:  In: 810 [P.O.:810]  Out: 2650 [Urine:2650]    Physical Exam   Constitutional: She is oriented to person, place, and time. She appears well-developed. No distress.   Temporal and distal extremity muscle wasting   HENT:   Head: Normocephalic and atraumatic.   Eyes: EOM are normal. No scleral icterus.   Neck: Normal range of motion.   Cardiovascular: Normal rate and regular rhythm.   No murmur heard.  Pulmonary/Chest: Effort normal. No respiratory distress. She has  no wheezes.   Abdominal: Bowel sounds are normal. She exhibits distension (improved from yesterday). There is tenderness. There is no guarding.   Chevron incision w/ steri strips, minimal serous leakage from R side   RLQ VALORIE drain sites c/d/i with sutures   Musculoskeletal: Normal range of motion. She exhibits edema (2+ BLE, + generalized).   Neurological: She is alert and oriented to person, place, and time.   Skin: Skin is warm. She is not diaphoretic.   Psychiatric: She has a normal mood and affect.   Nursing note and vitals reviewed.      Significant Labs:  CBC:   Recent Labs   Lab 11/19/18  0815   WBC 5.83   RBC 2.79*   HGB 8.0*   HCT 25.6*   *   MCV 92   MCH 28.7   MCHC 31.3*     CMP:   Recent Labs   Lab 11/19/18  0814   *   CALCIUM 8.7   ALBUMIN 2.7*   PROT 5.1*   *   K 3.6   CO2 25   CL 92*   BUN 93*   CREATININE 2.1*   ALKPHOS 123   ALT 19   AST 19   BILITOT 1.3*     All labs within the past 24 hours have been reviewed.     Significant Imaging:  US Liver Transplant Impression       Abnormally decreased main hepatic artery peak systolic velocity with slow arterial flow and abnormal tardus parvus waveforms intrahepatically.  Findings concerning for developing hepatic artery stenosis/rejection.  Recommend continued close follow-up with consideration of possible contrast enhanced triple-phase abdominal CT for further evaluation if clinically indicated.    Peritransplant fluid collection as above.    Grossly stable ascites.    Right-sided pleural effusion.    This report was flagged in Epic as abnormal.    Electronically signed by resident: Ciro Love  Date: 11/15/2018  Time: 10:19    Electronically signed by: Yvan Watkins MD  Date: 11/15/2018  Time: 10:29         Assessment/Plan:      Post LT - Acute renal failure    Post-LT EVA is multifactorial in origin and has been related to the severity of liver disease, toxicity of immunosuppressive therapy and hepatic ischaemia reperfusion injury  might be a driving force in the aetiology of post-LT EVA.     Plan:  - No indication for RRT  - stable UOP with slowly improving sCr  - Arteriogram please consider prepping   - No uremic symptoms or signs.   - Patient still with significant pitting LE edema and evidence of fluid overload, but has had significant improvement in past week.   -  Will need to trend renal functin.  - Long discussion between Vascular Sx and Liver Tx service, will hold of for now.    - Renal function improved today; creatinine 2.1 yesterday.   - Continue furosemide at 80 mg PO BID dose.           Thank you for your consult. I will follow-up with patient. Please contact us if you have any additional questions.    Dakota Garcia MD  Nephrology  [facility]

## 2018-11-19 NOTE — NURSING TRANSFER
Nursing Transfer Note      11/19/2018     Transfer To: TSU [id number]    Transfer via bed    Transfer with none    Transported by PCT x 2    Medicines sent: none    Chart send with patient: Yes    Notified: Family, RN    Patient reassessed at: 5:55 PM 11/19/2018    Upon arrival to floor: cardiac monitor applied, patient oriented to room, call bell in reach and bed in lowest position    Nurse given report before transfer.

## 2018-11-19 NOTE — ASSESSMENT & PLAN NOTE
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   - POD 1 US: increased arterial resistive indices, suggestive of edema vs acute rejection vs congestion.  - Repeat US 10/9 with continued elevation of RIs.  - LFTs stable.  - T bili trending down.   - Repeat liver US (10/11): elevated RIs.  - Liver US 11/6 to assess for ascites. No arterial flow within the liver allograft concerning for interval thrombosis/occlusion of the arterial system. Severe ascites seen.  - Liver US 11/8: arterial flow seen w/ elevated RIs   - LFTs remain stable. No CTA at this time due to EVA and normal liver function.   - Liver US 11/15 showed "abnormally decreased main hepatic artery peak systolic velocity with slow arterial flow and abnormal tardus parvus waveforms intrahepatically."   -Vascular surgery consulted  - 11/16: middle chevron incision opened with purulent drainage. Packed w/ gauze. Growing Klebsiella p. No other intervention @ this time. Wound care recs pending.  -CT abd/pelvis 11/17- no signs of infection  - Liver US 11/19 showed no arterial flow on the right and sluggish flow on the left. Vascular surgery will proceed w/ hepatic angiogram today.         "

## 2018-11-19 NOTE — ASSESSMENT & PLAN NOTE
"- H&H cont to fluctuate.   - FOBT +.  - EGD 10/18 - showed diffuse bleeding with ulcerated duodenal mucosa.  - EGD 11/5 - improvement seen, no active bleeding.  - 1 unit PRBCs transfused 10/27 and 11/3.   - H&H with good response.   - Resume heparin sq injections 11/6.   - Decrease heparin sq from TID to BID 11/15   -H eparin dc'd 11/17  - See "anemia requiring transfusions."  - Continue to monitor.         "

## 2018-11-19 NOTE — SUBJECTIVE & OBJECTIVE
Interval History:   No acute events overnight. UOP 1050 ml UOP overnight fluid balance -450 ml yesterday. sCr very slowly improving. Heptic angiogram was h elp todau Doppler US of liver today. She feels well.     Review of patient's allergies indicates:  No Known Allergies  Current Facility-Administered Medications   Medication Frequency    acetaminophen tablet 650 mg Q6H PRN    aspirin chewable tablet 81 mg Daily    atovaquone suspension 1,500 mg Daily    bisacodyl EC tablet 10 mg QHS    bisacodyl suppository 10 mg Daily PRN    calcium carbonate 200 mg calcium (500 mg) chewable tablet 500 mg TID PRN    dextrose 50% injection 12.5 g PRN    dextrose 50% injection 25 g PRN    docusate sodium capsule 100 mg Daily    entecavir tablet 0.5 mg Q72H    fentaNYL injection 25 mcg Q5 Min PRN    furosemide tablet 80 mg BID    glucagon (human recombinant) injection 1 mg PRN    glucose chewable tablet 16 g PRN    glucose chewable tablet 24 g PRN    insulin aspart U-100 pen 0-5 Units QID (AC + HS) PRN    insulin aspart U-100 pen 8 Units TIDWM    magnesium oxide tablet 800 mg BID    midodrine tablet 15 mg TID    mycophenolate capsule 500 mg BID    ondansetron disintegrating tablet 8 mg Q8H PRN    ondansetron injection 4 mg Q8H PRN    ondansetron injection 4 mg Once PRN    pantoprazole EC tablet 40 mg BID    polyethylene glycol packet 17 g Daily PRN    polyethylene glycol packet 17 g Daily    predniSONE tablet 10 mg Daily    Followed by    [START ON 11/24/2018] predniSONE tablet 5 mg Daily    Followed by    [START ON 12/1/2018] predniSONE tablet 2.5 mg Daily    sodium chloride 0.9% flush 3 mL PRN    sodium chloride 0.9% flush 3 mL PRN    [START ON 11/20/2018] tacrolimus capsule 0.5 mg BID    traMADol tablet 50 mg Q6H PRN    ursodiol capsule 300 mg BID       Objective:     Vital Signs (Most Recent):  Temp: 96.4 °F (35.8 °C) (11/19/18 1545)  Pulse: 90 (11/19/18 1615)  Resp: 20 (11/19/18 1600)  BP: (!)  130/56 (11/19/18 1615)  SpO2: 100 % (11/19/18 1615)  O2 Device (Oxygen Therapy): room air (11/19/18 0752) Vital Signs (24h Range):  Temp:  [96.4 °F (35.8 °C)-97.9 °F (36.6 °C)] 96.4 °F (35.8 °C)  Pulse:  [] 90  Resp:  [10-20] 20  SpO2:  [96 %-100 %] 100 %  BP: (110-140)/(56-75) 130/56     Weight: 63.2 kg (139 lb 5.3 oz) (11/19/18 0647)  Body mass index is 24.68 kg/m².  Body surface area is 1.68 meters squared.    I/O last 3 completed shifts:  In: 810 [P.O.:810]  Out: 2650 [Urine:2650]    Physical Exam   Constitutional: She is oriented to person, place, and time. She appears well-developed. No distress.   Temporal and distal extremity muscle wasting   HENT:   Head: Normocephalic and atraumatic.   Eyes: EOM are normal. No scleral icterus.   Neck: Normal range of motion.   Cardiovascular: Normal rate and regular rhythm.   No murmur heard.  Pulmonary/Chest: Effort normal. No respiratory distress. She has no wheezes.   Abdominal: Bowel sounds are normal. She exhibits distension (improved from yesterday). There is tenderness. There is no guarding.   Chevron incision w/ steri strips, minimal serous leakage from R side   RLQ VALORIE drain sites c/d/i with sutures   Musculoskeletal: Normal range of motion. She exhibits edema (2+ BLE, + generalized).   Neurological: She is alert and oriented to person, place, and time.   Skin: Skin is warm. She is not diaphoretic.   Psychiatric: She has a normal mood and affect.   Nursing note and vitals reviewed.      Significant Labs:  CBC:   Recent Labs   Lab 11/19/18  0815   WBC 5.83   RBC 2.79*   HGB 8.0*   HCT 25.6*   *   MCV 92   MCH 28.7   MCHC 31.3*     CMP:   Recent Labs   Lab 11/19/18  0814   *   CALCIUM 8.7   ALBUMIN 2.7*   PROT 5.1*   *   K 3.6   CO2 25   CL 92*   BUN 93*   CREATININE 2.1*   ALKPHOS 123   ALT 19   AST 19   BILITOT 1.3*     All labs within the past 24 hours have been reviewed.     Significant Imaging:  US Liver Transplant Impression        Abnormally decreased main hepatic artery peak systolic velocity with slow arterial flow and abnormal tardus parvus waveforms intrahepatically.  Findings concerning for developing hepatic artery stenosis/rejection.  Recommend continued close follow-up with consideration of possible contrast enhanced triple-phase abdominal CT for further evaluation if clinically indicated.    Peritransplant fluid collection as above.    Grossly stable ascites.    Right-sided pleural effusion.    This report was flagged in Epic as abnormal.    Electronically signed by resident: Ciro Love  Date: 11/15/2018  Time: 10:19    Electronically signed by: Yvan Watkins MD  Date: 11/15/2018  Time: 10:29

## 2018-11-19 NOTE — ASSESSMENT & PLAN NOTE
- Valcyte for CMV prophylaxis--> holding (10/31) for leukopenia. Pt will need weekly CMV PCR while discontinued.  - CMV PCR noted to be positive at 129. .  - CMV PCR 11/8: 105  - CMV PCR 11/15 with 89 copies  - Next CMV PCR due 11/22  - Bactrim for PCP prophylaxis ([facility]).--> holding (10/31) for leukopenia.   - Start Atovaquone 11/6.  - Fluconazole for fungal prophylaxis (first dose 10/6). D/c Fluc 11/16 bc pt had been on it for > 30 days post take back.

## 2018-11-19 NOTE — ASSESSMENT & PLAN NOTE
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     Plan:  - No indication for RRT  - stable UOP with slowly improving sCr  - Arteriogram please consider prepping   - No uremic symptoms or signs.   - Patient still with significant pitting LE edema and evidence of fluid overload, but has had significant improvement in past week.   -  Will need to trend renal functin.  - Long discussion between Vascular Sx and Liver Tx service, will hold of for now.    - Renal function improved today; creatinine 2.1 yesterday.   - Continue furosemide at 80 mg PO BID dose.

## 2018-11-19 NOTE — ASSESSMENT & PLAN NOTE
- Prograf: stopped 10/29. Resumed 11/2.  --> Will monitor for signs of toxic side effects, check daily troughs, and change meds accordingly. Hold 11/19 PM dose 2/2 pt receiving contrast w/ hepatic angiogram today.   - Prednisone: stopped 10/29. Started on Hydrocortisone. pred taper resumed.  - Cellcept: stopped 10/31 for leukopenia. Restarted Cellcept 500 mg bid, 11/6.

## 2018-11-19 NOTE — PT/OT/SLP PROGRESS
Occupational Therapy      Patient Name:  Scarlett Reddy   MRN:  [id number]    Patient not seen today secondary to Unavailable (off floor for hepatic angio during PM attempt). Will follow-up as scheduled.    Laura corado OT  11/19/2018

## 2018-11-19 NOTE — SUBJECTIVE & OBJECTIVE
"Interval HPI:   Overnight events:  Remains in TSU. BG above goal overnight.Did not receive correction scale at noon BG reading due to procedure. Prednisone 10 mg daily; standard steroid taper.   Eatin%  Nausea: No  Hypoglycemia and intervention: No  Fever: No  TPN and/or TF: No    /74   Pulse 94   Temp 97.8 °F (36.6 °C)   Resp 11   Ht 5' 3" (1.6 m)   Wt 63.2 kg (139 lb 5.3 oz)   LMP  (LMP Unknown)   SpO2 95%   Breastfeeding? No   BMI 24.68 kg/m²     Labs Reviewed and Include    Recent Labs   Lab 18  0814   *   CALCIUM 8.7   ALBUMIN 2.7*   PROT 5.1*   *   K 3.6   CO2 25   CL 92*   BUN 93*   CREATININE 2.1*   ALKPHOS 123   ALT 19   AST 19   BILITOT 1.3*     Lab Results   Component Value Date    WBC 5.83 2018    HGB 8.0 (L) 2018    HCT 25.6 (L) 2018    MCV 92 2018     (L) 2018     No results for input(s): TSH, FREET4 in the last 168 hours.  Lab Results   Component Value Date    HGBA1C 6.0 (H) 10/04/2018       Nutritional status:   Body mass index is 24.68 kg/m².  Lab Results   Component Value Date    ALBUMIN 2.7 (L) 2018    ALBUMIN 2.5 (L) 2018    ALBUMIN 2.5 (L) 2018     Lab Results   Component Value Date    PREALBUMIN 15 (L) 2018    PREALBUMIN 12 (L) 2018       Estimated Creatinine Clearance: 22.6 mL/min (A) (based on SCr of 2.1 mg/dL (H)).    Accu-Checks  Recent Labs     18  2130 18  0809 18  1305 18  1801 18  2108 18  0752 18  1207 18  1304 18  1745 18  2205   POCTGLUCOSE 210* 177* 206* 131* 87 180* 175* 204* 270* 225*       Current Medications and/or Treatments Impacting Glycemic Control  Immunotherapy:    Immunosuppressants         Stop Route Frequency     tacrolimus capsule 0.5 mg      -- Oral 2 times daily     mycophenolate capsule 500 mg      -- Oral 2 times daily        Steroids:   Hormones (From admission, onward)    Start     Stop Route " Frequency Ordered    12/01/18 0900  predniSONE tablet 2.5 mg      12/08 0859 Oral Daily 11/05/18 1004    11/24/18 0900  predniSONE tablet 5 mg      12/01 0859 Oral Daily 11/05/18 1004    11/17/18 0900  predniSONE tablet 10 mg      11/24 0859 Oral Daily 11/05/18 1004    10/17/18 0945  predniSONE tablet 15 mg      10/24 0859 Oral Daily 10/17/18 0943        Pressors:    Autonomic Drugs (From admission, onward)    Start     Stop Route Frequency Ordered    10/10/18 1500  midodrine tablet 15 mg      -- Oral 3 times daily 10/10/18 1122    10/06/18 1617  rocuronium (ZEMURON) 10 mg/mL injection     Comments:  Created by cabinet overrholley    10/07 0429   10/06/18 1617        Hyperglycemia/Diabetes Medications:   Antihyperglycemics (From admission, onward)    Start     Stop Route Frequency Ordered    11/19/18 1130  insulin aspart U-100 pen 8 Units      -- SubQ 3 times daily with meals 11/19/18 0834    10/14/18 0925  insulin aspart U-100 pen 0-5 Units      -- SubQ Before meals & nightly PRN 10/14/18 0825    10/07/18 1200  insulin regular (Humulin R) 100 Units in sodium chloride 0.9% 100 mL infusion      10/13 2100 IV Continuous 10/07/18 1057

## 2018-11-19 NOTE — ASSESSMENT & PLAN NOTE
- Middle chevron incision opened 11/16 with purulent drainage  - Packed with Aquacel ag at this time.   - Fluid cultured- positive for Klebsiella p. No intervention at this time   - Wound care consulted. Appreciate recs

## 2018-11-19 NOTE — PROGRESS NOTES
Consulted to see for chevron incision.  Pt remains off of the floor and nurse states she is in angiogram . Will defer assessment until tomorrow.   Urban Patel RN CWON  [id number]

## 2018-11-19 NOTE — PROGRESS NOTES
[facility]  Vascular Surgery  Progress Note    Patient Name: Scarlett Reddy  MRN: [id number]  Admission Date: 10/1/2018  Primary Care Provider: Primary Doctor No    Subjective:     Interval History: NAEON. US liver showed worsening hepatic artery stenosis. Plan for hepatic angio today despite EVA.    Post-Op Info:  Procedure(s) (LRB):  ANGIOGRAM-HEPATIC (N/A)   Day of Surgery       Medications:  Continuous Infusions:  Scheduled Meds:   aspirin  81 mg Oral Daily    atovaquone  1,500 mg Oral Daily    bisacodyl  10 mg Oral QHS    docusate sodium  100 mg Oral Daily    entecavir  0.5 mg Oral Q72H    furosemide  80 mg Oral BID    insulin aspart U-100  8 Units Subcutaneous TIDWM    magnesium oxide  800 mg Oral BID    midodrine  15 mg Oral TID    mycophenolate  500 mg Oral BID    pantoprazole  40 mg Oral BID    polyethylene glycol  17 g Oral Daily    predniSONE  10 mg Oral Daily    Followed by    [START ON 11/24/2018] predniSONE  5 mg Oral Daily    Followed by    [START ON 12/1/2018] predniSONE  2.5 mg Oral Daily    tacrolimus  0.5 mg Oral BID    ursodiol  300 mg Oral BID     PRN Meds:acetaminophen, bisacodyl, calcium carbonate, dextrose 50%, dextrose 50%, glucagon (human recombinant), glucose, glucose, insulin aspart U-100, ondansetron, ondansetron, polyethylene glycol, sodium chloride 0.9%, traMADol     Objective:     Vital Signs (Most Recent):  Temp: 97.9 °F (36.6 °C) (11/19/18 0752)  Pulse: 97 (11/19/18 0752)  Resp: 14 (11/19/18 0752)  BP: 123/75 (11/19/18 0752)  SpO2: 96 % (11/19/18 0752) Vital Signs (24h Range):  Temp:  [97.4 °F (36.3 °C)-97.9 °F (36.6 °C)] 97.9 °F (36.6 °C)  Pulse:  [] 97  Resp:  [10-14] 14  SpO2:  [96 %-98 %] 96 %  BP: (110-123)/(69-75) 123/75          Physical Exam   Constitutional: She appears well-developed. No distress.   Temporal and distal extremity muscle wasting   HENT:   Head: Normocephalic and atraumatic.   Eyes: EOM are normal. No scleral icterus.    Neck: Normal range of motion.   Cardiovascular: Normal rate and regular rhythm.   No murmur heard.  Pulmonary/Chest: Effort normal.   Abdominal: Soft. She exhibits distension (slowly improving). There is tenderness. There is no guarding.   Incision with packing in place.    Musculoskeletal: Normal range of motion. She exhibits edema (2+ BLE, + generalized).   Neurological: She is alert.   Skin: Skin is warm.       Significant Labs:  CBC:   Recent Labs   Lab 11/19/18  0815   WBC 5.83   RBC 2.79*   HGB 8.0*   HCT 25.6*   *   MCV 92   MCH 28.7   MCHC 31.3*     CMP:   Recent Labs   Lab 11/19/18  0814   *   CALCIUM 8.7   ALBUMIN 2.7*   PROT 5.1*   *   K 3.6   CO2 25   CL 92*   BUN 93*   CREATININE 2.1*   ALKPHOS 123   ALT 19   AST 19   BILITOT 1.3*       Significant Diagnostics:  U/S: Us Liver Transplant Post    Result Date: 11/19/2018  No arterial flow could be seen within the right side of the liver transplant.  On the left, only sluggish arterial flow was seen.  Thrombosis of the unseen right-sided arterial system is a possibility. Moderate perihepatic ascites. Small region of decreased echogenicity in the right lobe of the liver.  This could represent infarct. COMMUNICATION This critical result was discovered/received at 09:50.  The critical information above was relayed directly by me by telephone to Dr. Sprague  On 11/19/2018 at 09:56. 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

## 2018-11-19 NOTE — BRIEF OP NOTE
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 Selective hepatic arteriogram  2. US guided L brachial access    Anesthesia: Local MAC    Description of Procedure: 4 fr L brachial/manual compression; 13/7 min fluoro;436 mGy; 24 cc visipaque    Description of the findings of the procedure: HAT, could not be crossed;  Dr Theodore present    Estimated Blood Loss: <4cc         Specimens:   Specimen (12h ago, onward)    None

## 2018-11-19 NOTE — PT/OT/SLP PROGRESS
Physical Therapy      Patient Name:  Scarlett Reddy   MRN:  [id number]    Patient not seen today secondary to off floor for procedure in AM - PT unable to return later in day for treatment. Will follow-up next scheduled date.    Marcelino Bird, PT   11/19/2018

## 2018-11-19 NOTE — PROGRESS NOTES
Consulted for chevron incision.  Unable to see as pt is off of the floor in ultrasound.  Urban Patel RN CWON  [phone]

## 2018-11-19 NOTE — TRANSFER OF CARE
"Anesthesia Transfer of Care Note    Patient: Scarlett Reddy    Procedure(s) Performed: Procedure(s) (LRB):  ANGIOGRAM-HEPATIC (Left)    Patient location: PACU    Anesthesia Type: MAC    Transport from OR: Transported from OR on 6-10 L/min O2 by face mask with adequate spontaneous ventilation    Post pain: adequate analgesia    Post assessment: no apparent anesthetic complications and tolerated procedure well    Post vital signs: stable    Level of consciousness: awake, alert and oriented    Nausea/Vomiting: no nausea/vomiting    Complications: none    Transfer of care protocol was followed      Last vitals:   Visit Vitals  BP (!) 140/65 (BP Location: Left arm, Patient Position: Lying)   Pulse 95   Temp 35.8 °C (96.4 °F) (Temporal)   Resp 20   Ht 5' 3" (1.6 m)   Wt 63.2 kg (139 lb 5.3 oz)   LMP  (LMP Unknown)   SpO2 100%   Breastfeeding? No   BMI 24.68 kg/m²     "

## 2018-11-19 NOTE — NURSING TRANSFER
Nursing Transfer Note      11/19/2018     Transfer From: [id number]    Transfer via stretcher    Transported by [facility] staff    Medicines sent: n/a    Chart send with patient: Yes    Notified: spouse, translators

## 2018-11-19 NOTE — ANESTHESIA POSTPROCEDURE EVALUATION
"Anesthesia Post Evaluation    Patient: Scarlett Reddy    Procedure(s) Performed: Procedure(s) (LRB):  ANGIOGRAM-HEPATIC (Left)    Final Anesthesia Type: MAC  Patient location during evaluation: PACU  Patient participation: Yes- Able to Participate  Level of consciousness: awake and alert and oriented  Post-procedure vital signs: reviewed and stable  Pain management: adequate  Airway patency: patent  PONV status at discharge: No PONV  Anesthetic complications: no      Cardiovascular status: blood pressure returned to baseline, hemodynamically stable and stable  Respiratory status: unassisted, room air and spontaneous ventilation  Hydration status: euvolemic  Follow-up not needed.        Visit Vitals  BP (!) 130/56   Pulse 90   Temp 35.8 °C (96.4 °F) (Temporal)   Resp 20   Ht 5' 3" (1.6 m)   Wt 63.2 kg (139 lb 5.3 oz)   LMP  (LMP Unknown)   SpO2 100%   Breastfeeding? No   BMI 24.68 kg/m²       Pain/Annette Score: Pain Assessment Performed: Yes (11/19/2018  3:45 PM)  Presence of Pain: denies (11/19/2018  3:45 PM)  Annette Score: 10 (11/19/2018  3:45 PM)        "

## 2018-11-19 NOTE — ANESTHESIA PREPROCEDURE EVALUATION
11/19/2018  Pre-operative evaluation for Procedure(s) (LRB):  ANGIOGRAM-HEPATIC (N/A)    Scarlett Reddy is a 68 y.o. female hx of chronic hep B with cirrhosis, DMII. S/p liver transplantt.  Worsening renal function. US Liver with evidence of poor arterial flow to right side of liver, sluggish arterial flow to left.     Last NPO: Ate breakfast (grits, cereal) approximately 830 am. Patient is booked as a class B and will proceed with appropriate precautions  Access: Right UE 20g    Patient Active Problem List   Diagnosis    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    Type 2 diabetes mellitus with diabetic polyneuropathy, with long-term current use of insulin    Severe malnutrition    Anemia requiring transfusions    Hypotension    Hyponatremia    Physical deconditioning    Weakness    Ascites    Liver transplanted    Prophylactic immunotherapy    Long-term use of immunosuppressant medication    At risk for opportunistic infections    Metabolic acidosis    EVA (acute kidney injury)    Hypervolemia associated with renal insufficiency     Post LT - Acute renal failure    Acute blood loss anemia    UTI (urinary tract infection)    Azotemia    Leukopenia    Alteration in skin integrity    Thrombocytopenia, unspecified    Other drug-induced pancytopenia    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      Current Outpatient Medications on File Prior to Encounter   Medication Sig Dispense Refill    insulin aspart U-100 (NOVOLOG FLEXPEN U-100 INSULIN) 100 unit/mL InPn pen Inject 20 units into the skin with breakfast,17 units with lunch, and 17 with dinner Plus correction scale, max TDD 60 units daily 15 mL  "1    insulin glargine (LANTUS) 100 unit/mL injection Inject 20 Units into the skin every evening. 15 mL 3    midodrine (PROAMATINE) 5 MG Tab Take 3 tablets (15 mg total) by mouth every 8 (eight) hours. 270 tablet 5    OMEGA-3 FATTY ACIDS-EPA ORAL Take by mouth.      rifAXIMin (XIFAXAN) 550 mg Tab Take 1 tablet (550 mg total) by mouth 2 (two) times daily. 60 tablet 5    sodium bicarbonate 650 MG tablet Take 2 tablets (1,300 mg total) by mouth 3 (three) times daily. 120 tablet 11    blood sugar diagnostic (ACCU-CHEK DC) Strp 1 strip by Misc.(Non-Drug; Combo Route) route 3 (three) times daily.      pen needle, diabetic (BD ULTRA-FINE CITLALI PEN NEEDLE) 32 gauge x 5/32" Ndle To use to inject insulin 4x daily 100 each 3       Past Surgical History:   Procedure Laterality Date    EGD (ESOPHAGOGASTRODUODENOSCOPY) N/A 11/5/2018    Performed by Johann Mejia MD at [facility] (2ND FLR)    EGD (ESOPHAGOGASTRODUODENOSCOPY) N/A 10/18/2018    Performed by Chung Robins MD at [facility] (2ND FLR)    EGD (ESOPHAGOGASTRODUODENOSCOPY) N/A 8/17/2018    Performed by Travon Delgadillo MD at [facility] (2ND FLR)    ESOPHAGOGASTRODUODENOSCOPY N/A 8/17/2018    Procedure: EGD (ESOPHAGOGASTRODUODENOSCOPY);  Surgeon: Travon Delgadillo MD;  Location: [address]);  Service: Endoscopy;  Laterality: N/A;    ESOPHAGOGASTRODUODENOSCOPY N/A 10/18/2018    Procedure: EGD (ESOPHAGOGASTRODUODENOSCOPY);  Surgeon: Chung Robins MD;  Location: [address]);  Service: Endoscopy;  Laterality: N/A;    ESOPHAGOGASTRODUODENOSCOPY N/A 11/5/2018    Procedure: EGD (ESOPHAGOGASTRODUODENOSCOPY);  Surgeon: Johann Mejia MD;  Location: [address]);  Service: Endoscopy;  Laterality: N/A;    EXPLORATORY LAPAROTOMY AFTER LIVER TRANSPLANTATION N/A 10/6/2018    Procedure: LAPAROTOMY, EXPLORATORY, AFTER LIVER TRANSPLANT;  Surgeon: Benson Matson MD;  Location: [facility] OR [address] [place] [address];  Service: Transplant;  Laterality: N/A;    LAPAROTOMY, " EXPLORATORY, AFTER LIVER TRANSPLANT N/A 10/6/2018    Performed by Benson Matson MD at [facility] OR [address] [place] [address]    LIVER TRANSPLANT N/A 10/4/2018    Procedure: TRANSPLANT, LIVER;  Surgeon: Yony Burns MD;  Location: [facility] OR [address] [place] [address];  Service: Transplant;  Laterality: N/A;    TRANSPLANT, LIVER N/A 10/4/2018    Performed by Yony Burns MD at [facility] OR [address] [place] [address]       Social History     Socioeconomic History    Marital status:      Spouse name: Not on file    Number of children: Not on file    Years of education: Not on file    Highest education level: Not on file   Social Needs    Financial resource strain: Not on file    Food insecurity - worry: Not on file    Food insecurity - inability: Not on file    Transportation needs - medical: Not on file    Transportation needs - non-medical: Not on file   Occupational History    Not on file   Tobacco Use    Smoking status: Never Smoker    Smokeless tobacco: Never Used   Substance and Sexual Activity    Alcohol use: No     Frequency: Never    Drug use: No    Sexual activity: Not on file   Other Topics Concern    Not on file   Social History Narrative    Not on file         CBC:   Recent Labs     11/18/18  0624 11/19/18  0815   WBC 5.28 5.83   RBC 2.63* 2.79*   HGB 7.7* 8.0*   HCT 23.4* 25.6*   * 141*   MCV 89 92   MCH 29.3 28.7   MCHC 32.9 31.3*       CMP:   Recent Labs     11/18/18  0624 11/19/18  0814 11/19/18  0815   * 131*  --    K 4.0 3.6  --    CL 92* 92*  --    CO2 25 25  --    BUN 97* 93*  --    CREATININE 2.2* 2.1*  --    * 162*  --    MG 2.2  --  2.1   PHOS 2.8  --  2.6*   CALCIUM 8.4* 8.7  --    ALBUMIN 2.5* 2.7*  --    PROT 4.6* 5.1*  --    ALKPHOS 115 123  --    ALT 18 19  --    AST 18 19  --    BILITOT 1.2* 1.3*  --        INR  No results for input(s): PT, INR, PROTIME, APTT in the last 72 hours.        Diagnostic Studies:      EKG:  Normal sinus rhythm  Prolonged QT  Abnormal ECG  When compared with ECG of  04-OCT-2018 21:41,  No significant change was found  Confirmed by BERNARDO OLIVAS MD ([id number]) on 10/21/2018 4:08:00     2D Echo:  Results for orders placed or performed during the hospital encounter of 10/01/18   2D echo with color flow doppler   Result Value Ref Range    QEF 75 55 - 65    Mitral Valve Regurgitation TRIVIAL     Diastolic Dysfunction Yes (A)     Aortic Valve Regurgitation MILD     Mitral Valve Mobility NORMAL     Tricuspid Valve Regurgitation TRIVIAL     1 - Hyperdynamic left ventricular systolic function (EF >70%).     2 - Impaired LV relaxation, normal LAP (grade 1 diastolic dysfunction).     3 - Normal right ventricular systolic function .     4 - No wall motion abnormalities.     5 - Mild aortic regurgitation.     6 - Left pleural effusion.       Anesthesia Evaluation    I have reviewed the Patient Summary Reports.     I have reviewed the Medications.   Prednisone    Review of Systems  Anesthesia Hx:  History of prior surgery of interest to airway management or planning: Previous anesthesia: General Personal Hx of Anesthesia complications (Desaturation in PACU after Ex-lap requiring re-intubation)   Hematology/Oncology:         -- Anemia:   Cardiovascular:  Cardiovascular Normal     Pulmonary:  Pulmonary Normal    Renal/:   Chronic Renal Disease, ARF    Hepatic/GI:   Liver Disease, Hepatitis, B S/p Liver tranplant 10/4/18   Neurological:   Peripheral Neuropathy    Endocrine:   Diabetes, type 2        Physical Exam  General:  Well nourished, Malnutrition    Airway/Jaw/Neck:  Airway Findings: Mouth Opening: Normal Tongue: Normal  General Airway Assessment: Adult  Mallampati: I  TM Distance: Normal, at least 6 cm  Jaw/Neck Findings:     Neck ROM: Normal ROM  Neck Findings:     Eyes/Ears/Nose:  EYES/EARS/NOSE FINDINGS: Normal   Dental:  Dental Findings: In tact   Chest/Lungs:  Chest/Lungs Findings: Clear to auscultation     Heart/Vascular:  Heart Findings: Rate: Normal  Rhythm: Regular Rhythm  Sounds:  Normal        Mental Status:  Mental Status Findings:  Alert and Oriented         Anesthesia Plan  Type of Anesthesia, risks & benefits discussed:  Anesthesia Type:  general, MAC  Patient's Preference:   Intra-op Monitoring Plan: standard ASA monitors  Intra-op Monitoring Plan Comments:   Post Op Pain Control Plan: multimodal analgesia, IV/PO Opioids PRN and per primary service following discharge from PACU  Post Op Pain Control Plan Comments:   Induction:   IV  Beta Blocker:         Informed Consent: Patient understands risks and agrees with Anesthesia plan.  Questions answered. Anesthesia consent signed with patient.  ASA Score: 4     Day of Surgery Review of History & Physical:    H&P update referred to the surgeon.         Ready For Surgery From Anesthesia Perspective.

## 2018-11-19 NOTE — ASSESSMENT & PLAN NOTE
67yo F with OLTx 10/4/18 with worsening hepatic artery stenosis    - Hepatic angio today  - NPO  - Consent in chart; discussed risk of worsening kidney injury including the potential need for dialysis from contrast in setting of EVA

## 2018-11-19 NOTE — PROGRESS NOTES
[facility]  Liver Transplant  Progress Note    Patient Name: Scarlett Reddy  MRN: [id number]  Admission Date: 10/1/2018  Hospital Length of Stay: 48 days  Code Status: Full Code  Primary Care Provider: Primary Doctor No  Post-Operative Day: 45    ORGAN:   LIVER  Disease Etiology: Cirrhosis: Type B and D  Donor Type:    - Brain Death  CDC High Risk:   No  Donor CMV Status:   Donor CMV Status: Positive  Donor HBcAB:   Negative  Donor HCV Status:   Negative  Donor HBV SETH: Negative  Donor HCV SETH: Negative  Whole or Partial: Whole Liver  Biliary Anastomosis: End to End  Arterial Anatomy: Replaced Left Hepatic and Right Hepatic  Subjective:     History of Present Illness:  Ms. Reddy is a 67 y/o female with PMH of ESLD secondary Hep B and D.  Listed for liver transplant with MELD 23.  Paracentesis 10/1 with 5L removed, no fluid sent for analysis.  Morning labs significant for hyponatremia, Na 119.  Pt admitted to LTS for sodium monitoring.      Hospital Course: 67 y/o F h/o HBV/delta cirrhosis s/p DBDLT 10/5/18 (CMV D+/R+, steroid induction, MMF/tacro/pred maintenance) with take back on 10/6 2/2 hyperbilirubinema and bilious VALORIE drainage, no leak identified. Post-op course c/b hypercapneic and hypoxic respiratory failure and was reintubated though quickly extubated now doing well. Liver bx (10/6) sig for Zone 3 hemorrhage and collapse, in addition to cholestasis. Transferred from ICU on POD #4. ID consulted to comment on positive diphtheroid culture from ascitic fluid (likely skin colonization) as well as ESBL Klebsiella pneumoniae in urine culture (10/4).  Pt asymptomatic and cell count from ascitic fluid unremarkable. Per ID recs, no need to treat diphtheroids or asymptomatic ESBL Kleb pneumo bacteriuria though pt has had 6 days of therapy which would cover these organisms. Mild peritransplant ascites- repeat US 10/6 with increased arterial resistive indices. Repeat US 10/9 w/ continued  elevation in RIs and fluid collections. LFTs trending down nicely.     Acute gradual worsening of renal function s/p OLTx. Creatinine on arrival 0.8 and has been up trending. Nephrology consulted for post-op EVA. Cr cont to trend up but remains to have good UOP. Had stable response to lasiz diuresis.  BUN elevated requiring multiple rounds of dialysis (10/15, 10/17, 10/20, 10/31). No significant improvements in kidney function noted.   In addition, pt H/H cont to fluctuate requiring multiple blood transfusions (10/14, 10/16, 10/18, 10/19, 10/22, 10/27, 10/29).  Pt reported dark stools 10/15 but color has normalized. FOBT+. EGD 10/18 consistent with ulcerated mucosa in duodenum s/p coagulation. Concern for H. Pylori. Started Amoxicillin/levofloxacin (10/19, complete 11/2)). Remains on Protonix 40 mg BID. Biopsy negative for infection and CMV. Will cont close monitoring H/H, transfuse as needed with goal Hb > 7.0. Of note, Urine cx + Klebsiella pneumoniae (10/13). Received 3 day course of ciprofloxacin, dc'd 10/19.   On 10/19 pm pt c/o crushing chest pain. Troponin 0.094, likely 2/2 ischemic stress. EKG NSR.   Liver US 11/6 showed no arterial flow. Paracentesis 11/7 with 8.9 L removed. Wbc 39, segs 16%. Gram stain no organisms seen + no WBC. Pt feels better since having fluid removed but she continues to have worsening abdominal distention & LE edema.Repeat Liver US 11/8 showed arterial flow with increased RIs. Paracentesis 11/12 with 8.6L removed (WBC 34, segs 25%).   11/15 liver u/s showed abnormally decreased main hepatic artery peak systolic velocity with slow arterial flow and abnormal tardus parvus waveforms intrahepatically. Consulted vascular surgery.    Interval history: No acute events overnight. Pt with purulent drainage from middle chevron incision line with minimal erythema of surrounding tissue. No pain vocalized. Wound packed with gauze and wound care recs pending. Drainage from incision site grew  Klebsiella. No intervention at this time because feel we achieved adequate source control. CT abd/pelvis 11/17 with no signs of infection. Remains afebrile. VSS. Pt still has significant abdominal distention and LE edema. However, renal fx continues to show improvement with Cr down to 2.1 this am. Adequate UOP. Will continue Lasix 80 mg BID and monitor. WBC improving. Cellcept restarted at 500 mg bid on 11/6. Most recent CMV PCR 11/15 with 87 copies. Continue to hold Valcyte. Monitor levels with weekly CMV PCR. H/H improved. Cont to monitor. Heparin dc'd 11/17. Liver US 11/19 showed no arterial flow on R and sluggish flow on L. Vascular surgery involved and will intervene w/ hepatic angiogram today. Cont to monitor.     Scheduled Meds:   aspirin  81 mg Oral Daily    atovaquone  1,500 mg Oral Daily    bisacodyl  10 mg Oral QHS    docusate sodium  100 mg Oral Daily    entecavir  0.5 mg Oral Q72H    furosemide  80 mg Oral BID    insulin aspart U-100  8 Units Subcutaneous TIDWM    magnesium oxide  800 mg Oral BID    midodrine  15 mg Oral TID    mycophenolate  500 mg Oral BID    pantoprazole  40 mg Oral BID    polyethylene glycol  17 g Oral Daily    predniSONE  10 mg Oral Daily    Followed by    [START ON 11/24/2018] predniSONE  5 mg Oral Daily    Followed by    [START ON 12/1/2018] predniSONE  2.5 mg Oral Daily    [START ON 11/20/2018] tacrolimus  0.5 mg Oral BID    ursodiol  300 mg Oral BID     Continuous Infusions:  PRN Meds:acetaminophen, bisacodyl, calcium carbonate, dextrose 50%, dextrose 50%, fentaNYL, glucagon (human recombinant), glucose, glucose, insulin aspart U-100, ondansetron, ondansetron, ondansetron, polyethylene glycol, sodium chloride 0.9%, sodium chloride 0.9%, traMADol    Review of Systems   Unable to perform ROS: Other   Skin: Positive for wound.   Allergic/Immunologic: Positive for immunocompromised state.     Objective:     Vital Signs (Most Recent):  Temp: 96.4 °F (35.8 °C)  (11/19/18 1545)  Pulse: 90 (11/19/18 1615)  Resp: 20 (11/19/18 1600)  BP: (!) 130/56 (11/19/18 1615)  SpO2: 100 % (11/19/18 1615) Vital Signs (24h Range):  Temp:  [96.4 °F (35.8 °C)-97.9 °F (36.6 °C)] 96.4 °F (35.8 °C)  Pulse:  [] 90  Resp:  [10-20] 20  SpO2:  [96 %-100 %] 100 %  BP: (110-140)/(56-75) 130/56     Weight: 63.2 kg (139 lb 5.3 oz)  Body mass index is 24.68 kg/m².    Intake/Output - Last 3 Shifts       11/17 0700 - 11/18 0659 11/18 0700 - 11/19 0659 11/19 0700 - 11/20 0659    P.O. 860 600     I.V. (mL/kg)   600 (9.5)    Total Intake(mL/kg) 860 (13.4) 600 (9.5) 600 (9.5)    Urine (mL/kg/hr) 1850 (1.2) 1050 (0.7)     Stool 0 0     Total Output 1850 1050     Net -990 -450 +600           Urine Occurrence 1 x      Stool Occurrence 1 x 1 x           Physical Exam   Constitutional: She is oriented to person, place, and time. She appears well-developed. No distress.   Temporal and distal extremity muscle wasting   HENT:   Head: Normocephalic and atraumatic.   Eyes: EOM are normal. No scleral icterus.   Neck: Normal range of motion.   Abdominal: She exhibits distension (slowly improving).   Musculoskeletal: Normal range of motion. She exhibits edema (2+ BLE, + generalized).   Neurological: She is alert and oriented to person, place, and time.   Skin: She is not diaphoretic.   Psychiatric: She has a normal mood and affect.   Nursing note and vitals reviewed.  - Limited ROS + PE as patient was on her way to hepatic angiogram on assessment.    Laboratory:  Immunosuppressants         Stop Route Frequency     tacrolimus capsule 0.5 mg      -- Oral 2 times daily     mycophenolate capsule 500 mg      -- Oral 2 times daily        CBC:   Recent Labs   Lab 11/19/18  0815   WBC 5.83   RBC 2.79*   HGB 8.0*   HCT 25.6*   *   MCV 92   MCH 28.7   MCHC 31.3*     CMP:   Recent Labs   Lab 11/19/18  0814   *   CALCIUM 8.7   ALBUMIN 2.7*   PROT 5.1*   *   K 3.6   CO2 25   CL 92*   BUN 93*   CREATININE 2.1*  "  ALKPHOS 123   ALT 19   AST 19   BILITOT 1.3*     Labs within the past 24 hours have been reviewed.    Diagnostic Results:  I have personally reviewed all pertinent imaging studies.    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   - POD 1 US: increased arterial resistive indices, suggestive of edema vs acute rejection vs congestion.  - Repeat US 10/9 with continued elevation of RIs.  - LFTs stable.  - T bili trending down.   - Repeat liver US (10/11): elevated RIs.  - Liver US 11/6 to assess for ascites. No arterial flow within the liver allograft concerning for interval thrombosis/occlusion of the arterial system. Severe ascites seen.  - Liver US 11/8: arterial flow seen w/ elevated RIs   - LFTs remain stable. No CTA at this time due to EVA and normal liver function.   - Liver US 11/15 showed "abnormally decreased main hepatic artery peak systolic velocity with slow arterial flow and abnormal tardus parvus waveforms intrahepatically."   -Vascular surgery consulted  - 11/16: middle chevron incision opened with purulent drainage. Packed w/ gauze. Growing Klebsiella p. No other intervention @ this time. Wound care recs pending.  -CT abd/pelvis 11/17- no signs of infection  - Liver US 11/19 showed no arterial flow on the right and sluggish flow on the left. Vascular surgery will proceed w/ hepatic angiogram today.            Delayed surgical wound healing    - Middle chevron incision opened 11/16 with purulent drainage  - Packed with Aquacel ag at this time.   - Fluid cultured- positive for Klebsiella p. No intervention at this time   - Wound care consulted. 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  - Monitor.       Alteration in skin integrity    Monitor.       Leukopenia    - Stopped Cellcept, Bactrim, and Valcyte 10/31 2/2 leukopenia  - Started Atovaquone 11/6.   - CMV PCR noted to be positive at 129. Will hold off on treatment with valcyte at this time.  - WBC improving   - Continue to monitor.       Azotemia    - CRRT 10/31 for uremic toxin clearance.           UTI (urinary tract infection)    - Urine Cx 10/15 w/ Klebsiella pneumonia.  - Started Ciprofloxacin (10/17-10/19).   - Started on amoxicillin/levo for H. Pylori (completed abx 11/2).  - Pt asymptomatic at this time.       Acute blood loss anemia    - H&H cont to fluctuate.   - FOBT +.  - EGD 10/18 - showed diffuse bleeding with ulcerated duodenal mucosa.  - EGD 11/5 - improvement seen, no active bleeding.  - 1 unit PRBCs transfused 10/27 and 11/3.   - H&H with good response.   - Resume heparin sq injections 11/6.   - Decrease heparin sq from TID to BID 11/15   -H eparin dc'd 11/17  - See "anemia requiring transfusions."  - Continue to monitor.             Post LT - Acute renal failure    - Creatinine on arrival 0.8 and trended up.  - Nephrology consulted.  - Renal US (10/11): no hydronephrosis.  - Echo 10/12: diastolic dysfunction, likely 2/2 to ARF.  - CRRT 10/14; SLED 10/17, 10/20. --> no improvements noted in kidney fxn.  - 24 hr urine creatine collection: CrCl of 7.   - CRRT 10/31 with 1L removed.   - See "hypervolemia associated with renal insufficiency"     Hypervolemia associated with renal insufficiency    - CRRT 10/14, 10/17, 10/20, 10/31.  - CXR 10/30: abundant fluid in b/l pleural space.  - Paracentesis 11/12: 8.6L removed   - Continue 80 mg Lasix BID.  - Monitor daily weights.       EVA (acute kidney injury)    - No plans for RRT in near future, per nephrology  - Trialysis line removed 11/13  - " Nephrology involved. Appreciate recs.  - Renal fx showing improvement   - Monitor.     Metabolic acidosis    - CRRT 10/14.  - SLED 10/17, 10/20, 10/31.         At risk for opportunistic infections    - Valcyte for CMV prophylaxis--> holding (10/31) for leukopenia. Pt will need weekly CMV PCR while discontinued.  - CMV PCR noted to be positive at 129. .  - CMV PCR 11/8: 105  - CMV PCR 11/15 with 89 copies  - Next CMV PCR due 11/22  - Bactrim for PCP prophylaxis (MW).--> holding (10/31) for leukopenia.   - Start Atovaquone 11/6.  - Fluconazole for fungal prophylaxis (first dose 10/6). D/c Fluc 11/16 bc pt had been on it for > 30 days post take back.       Long-term use of immunosuppressant medication    - Prograf: stopped 10/29. Resumed 11/2.  --> Will monitor for signs of toxic side effects, check daily troughs, and change meds accordingly. Hold 11/19 PM dose 2/2 pt receiving contrast w/ hepatic angiogram today.   - Prednisone: stopped 10/29. Started on Hydrocortisone. pred taper resumed.  - Cellcept: stopped 10/31 for leukopenia. Restarted Cellcept 500 mg bid, 11/6.         Prophylactic immunotherapy    - See long term use immunosuppresion.       Ascites    - Paracentesis 10/1, 5L removed, no fluid sent for analysis.  - Pt remains volume overloaded.   - Liver US 11/6 with severe ascites.   - Paracentesis 11/7 with 8.9 L removed. Wbc 39, segs 16%. Gram stain - no organisms seen and no WBC. Cultures NGTD.  - Paracentesis 11/12: 8.6 L removed. 34 wbc, 25% segs, 33 lymphs, 42 monocytes/macrophages. Cultures NGTD     Weakness    - See physical deconditioning.       Physical deconditioning    - PT/OT consulted.  - Recommend home health PT and rolling walker at discharge (orders placed 11/6).       Hyponatremia    - Na 119 on admit.  - Na was improving post transplant w HD.  - Na stable at this time.              Hypotension    - continue Midodrine 15 mg TID. Monitor       Anemia requiring transfusions    - Fluctuating  H&H.    - Transfusions: 10/14; 10/16; 10/18; 10/19; 10/22; 10/27; 10/29, 10/31  - FOBT +  - LDH elevated, Hapto < 10.  - EGD 10/18: ulcerated duodenal mucosa.   - Consulted GI about this ongoing issue and further need of another EGD.  - EGD 11/5 unremarkable.   - H/H decreased again 11/13: 7.2/22.6 from 8.5/26.5  - Repeat H/H stable  - Iron studies 11/13:  95, TIBC 107, sat iron 89, transferrin 72, ferritin pending  - Hemolytic anemia workup 11/13: Haptoglobin 132, retic 3.9   - H & H stable, no need for transfusion at this time  - Will continue to monitor closely.           Severe malnutrition    - Dietary consulted.   - Temporal and distal extremity muscle wasting and edema.  - Encourage supplements and to increase nutritional intake.  - Per nephrology, pt to be on low protein diet.   - Prealbumin reviewed.      Type 2 diabetes mellitus with diabetic polyneuropathy, with long-term current use of insulin    - Continue home regimen.  - Endocrine consulted.  - Pt off insulin drip at this time.  Apprec endo recs.        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  - Tx w/ Entecavir- dose changed to q72 hours given renal function.             VTE Risk Mitigation (From admission, onward)        Ordered     IP VTE HIGH RISK PATIENT  Once      11/05/18 1145     Place sequential compression device  Until discontinued      10/05/18 0709          The patients clinical status was discussed at multidisplinary rounds, involving transplant surgery, transplant medicine, pharmacy, nursing, nutrition, and social work    Discharge Planning:  No Patient Care Coordination Note on file.      Elizabeth Dean PA-C  Liver Transplant  [facility]

## 2018-11-19 NOTE — SUBJECTIVE & OBJECTIVE
Scheduled Meds:   aspirin  81 mg Oral Daily    atovaquone  1,500 mg Oral Daily    bisacodyl  10 mg Oral QHS    docusate sodium  100 mg Oral Daily    entecavir  0.5 mg Oral Q72H    furosemide  80 mg Oral BID    insulin aspart U-100  8 Units Subcutaneous TIDWM    magnesium oxide  800 mg Oral BID    midodrine  15 mg Oral TID    mycophenolate  500 mg Oral BID    pantoprazole  40 mg Oral BID    polyethylene glycol  17 g Oral Daily    predniSONE  10 mg Oral Daily    Followed by    [START ON 11/24/2018] predniSONE  5 mg Oral Daily    Followed by    [START ON 12/1/2018] predniSONE  2.5 mg Oral Daily    [START ON 11/20/2018] tacrolimus  0.5 mg Oral BID    ursodiol  300 mg Oral BID     Continuous Infusions:  PRN Meds:acetaminophen, bisacodyl, calcium carbonate, dextrose 50%, dextrose 50%, fentaNYL, glucagon (human recombinant), glucose, glucose, insulin aspart U-100, ondansetron, ondansetron, ondansetron, polyethylene glycol, sodium chloride 0.9%, sodium chloride 0.9%, traMADol    Review of Systems   Unable to perform ROS: Other   Skin: Positive for wound.   Allergic/Immunologic: Positive for immunocompromised state.     Objective:     Vital Signs (Most Recent):  Temp: 96.4 °F (35.8 °C) (11/19/18 1545)  Pulse: 90 (11/19/18 1615)  Resp: 20 (11/19/18 1600)  BP: (!) 130/56 (11/19/18 1615)  SpO2: 100 % (11/19/18 1615) Vital Signs (24h Range):  Temp:  [96.4 °F (35.8 °C)-97.9 °F (36.6 °C)] 96.4 °F (35.8 °C)  Pulse:  [] 90  Resp:  [10-20] 20  SpO2:  [96 %-100 %] 100 %  BP: (110-140)/(56-75) 130/56     Weight: 63.2 kg (139 lb 5.3 oz)  Body mass index is 24.68 kg/m².    Intake/Output - Last 3 Shifts       11/17 0700 - 11/18 0659 11/18 0700 - 11/19 0659 11/19 0700 - 11/20 0659    P.O. 860 600     I.V. (mL/kg)   600 (9.5)    Total Intake(mL/kg) 860 (13.4) 600 (9.5) 600 (9.5)    Urine (mL/kg/hr) 1850 (1.2) 1050 (0.7)     Stool 0 0     Total Output 1850 1050     Net -990 -450 +600           Urine Occurrence 1 x       Stool Occurrence 1 x 1 x           Physical Exam   Constitutional: She is oriented to person, place, and time. She appears well-developed. No distress.   Temporal and distal extremity muscle wasting   HENT:   Head: Normocephalic and atraumatic.   Eyes: EOM are normal. No scleral icterus.   Neck: Normal range of motion.   Abdominal: She exhibits distension (slowly improving).   Musculoskeletal: Normal range of motion. She exhibits edema (2+ BLE, + generalized).   Neurological: She is alert and oriented to person, place, and time.   Skin: She is not diaphoretic.   Psychiatric: She has a normal mood and affect.   Nursing note and vitals reviewed.  - Limited ROS + PE as patient was on her way to hepatic angiogram on assessment.    Laboratory:  Immunosuppressants         Stop Route Frequency     tacrolimus capsule 0.5 mg      -- Oral 2 times daily     mycophenolate capsule 500 mg      -- Oral 2 times daily        CBC:   Recent Labs   Lab 11/19/18  0815   WBC 5.83   RBC 2.79*   HGB 8.0*   HCT 25.6*   *   MCV 92   MCH 28.7   MCHC 31.3*     CMP:   Recent Labs   Lab 11/19/18  0814   *   CALCIUM 8.7   ALBUMIN 2.7*   PROT 5.1*   *   K 3.6   CO2 25   CL 92*   BUN 93*   CREATININE 2.1*   ALKPHOS 123   ALT 19   AST 19   BILITOT 1.3*     Labs within the past 24 hours have been reviewed.    Diagnostic Results:  I have personally reviewed all pertinent imaging studies.

## 2018-11-19 NOTE — SUBJECTIVE & OBJECTIVE
Medications:  Continuous Infusions:  Scheduled Meds:   aspirin  81 mg Oral Daily    atovaquone  1,500 mg Oral Daily    bisacodyl  10 mg Oral QHS    docusate sodium  100 mg Oral Daily    entecavir  0.5 mg Oral Q72H    furosemide  80 mg Oral BID    insulin aspart U-100  8 Units Subcutaneous TIDWM    magnesium oxide  800 mg Oral BID    midodrine  15 mg Oral TID    mycophenolate  500 mg Oral BID    pantoprazole  40 mg Oral BID    polyethylene glycol  17 g Oral Daily    predniSONE  10 mg Oral Daily    Followed by    [START ON 11/24/2018] predniSONE  5 mg Oral Daily    Followed by    [START ON 12/1/2018] predniSONE  2.5 mg Oral Daily    tacrolimus  0.5 mg Oral BID    ursodiol  300 mg Oral BID     PRN Meds:acetaminophen, bisacodyl, calcium carbonate, dextrose 50%, dextrose 50%, glucagon (human recombinant), glucose, glucose, insulin aspart U-100, ondansetron, ondansetron, polyethylene glycol, sodium chloride 0.9%, traMADol     Objective:     Vital Signs (Most Recent):  Temp: 97.9 °F (36.6 °C) (11/19/18 0752)  Pulse: 97 (11/19/18 0752)  Resp: 14 (11/19/18 0752)  BP: 123/75 (11/19/18 0752)  SpO2: 96 % (11/19/18 0752) Vital Signs (24h Range):  Temp:  [97.4 °F (36.3 °C)-97.9 °F (36.6 °C)] 97.9 °F (36.6 °C)  Pulse:  [] 97  Resp:  [10-14] 14  SpO2:  [96 %-98 %] 96 %  BP: (110-123)/(69-75) 123/75          Physical Exam   Constitutional: She appears well-developed. No distress.   Temporal and distal extremity muscle wasting   HENT:   Head: Normocephalic and atraumatic.   Eyes: EOM are normal. No scleral icterus.   Neck: Normal range of motion.   Cardiovascular: Normal rate and regular rhythm.   No murmur heard.  Pulmonary/Chest: Effort normal.   Abdominal: Soft. She exhibits distension (slowly improving). There is tenderness. There is no guarding.   Incision with packing in place.    Musculoskeletal: Normal range of motion. She exhibits edema (2+ BLE, + generalized).   Neurological: She is alert.   Skin:  Skin is warm.       Significant Labs:  CBC:   Recent Labs   Lab 11/19/18  0815   WBC 5.83   RBC 2.79*   HGB 8.0*   HCT 25.6*   *   MCV 92   MCH 28.7   MCHC 31.3*     CMP:   Recent Labs   Lab 11/19/18  0814   *   CALCIUM 8.7   ALBUMIN 2.7*   PROT 5.1*   *   K 3.6   CO2 25   CL 92*   BUN 93*   CREATININE 2.1*   ALKPHOS 123   ALT 19   AST 19   BILITOT 1.3*       Significant Diagnostics:  U/S: Us Liver Transplant Post    Result Date: 11/19/2018  No arterial flow could be seen within the right side of the liver transplant.  On the left, only sluggish arterial flow was seen.  Thrombosis of the unseen right-sided arterial system is a possibility. Moderate perihepatic ascites. Small region of decreased echogenicity in the right lobe of the liver.  This could represent infarct. COMMUNICATION This critical result was discovered/received at 09:50.  The critical information above was relayed directly by me by telephone to Dr. Sprague  On 11/19/2018 at 09:56. Electronically signed by: Ayanna Manriquez MD Date:    11/19/2018 Time:    10:00

## 2018-11-20 PROBLEM — E87.6 HYPOKALEMIA: Status: ACTIVE | Noted: 2018-11-20

## 2018-11-20 PROBLEM — D61.811 OTHER DRUG-INDUCED PANCYTOPENIA: Status: RESOLVED | Noted: 2018-11-12 | Resolved: 2018-11-20

## 2018-11-20 PROBLEM — E83.39 HYPOPHOSPHATEMIA: Status: ACTIVE | Noted: 2018-11-20

## 2018-11-20 LAB
ABO + RH BLD: NORMAL
ALBUMIN SERPL BCP-MCNC: 2.5 G/DL
ALP SERPL-CCNC: 115 U/L
ALT SERPL W/O P-5'-P-CCNC: 13 U/L
ANION GAP SERPL CALC-SCNC: 14 MMOL/L
AST SERPL-CCNC: 17 U/L
BASOPHILS # BLD AUTO: 0.02 K/UL
BASOPHILS NFR BLD: 0.3 %
BILIRUB SERPL-MCNC: 1.2 MG/DL
BLD GP AB SCN CELLS X3 SERPL QL: NORMAL
BUN SERPL-MCNC: 81 MG/DL
CALCIUM SERPL-MCNC: 8.1 MG/DL
CHLORIDE SERPL-SCNC: 90 MMOL/L
CO2 SERPL-SCNC: 26 MMOL/L
CREAT SERPL-MCNC: 2 MG/DL
DIFFERENTIAL METHOD: ABNORMAL
EOSINOPHIL # BLD AUTO: 0 K/UL
EOSINOPHIL NFR BLD: 0.1 %
ERYTHROCYTE [DISTWIDTH] IN BLOOD BY AUTOMATED COUNT: 17.2 %
EST. GFR  (AFRICAN AMERICAN): 28.7 ML/MIN/1.73 M^2
EST. GFR  (NON AFRICAN AMERICAN): 24.9 ML/MIN/1.73 M^2
GLUCOSE SERPL-MCNC: 212 MG/DL
HCT VFR BLD AUTO: 24.6 %
HGB BLD-MCNC: 7.7 G/DL
IMM GRANULOCYTES # BLD AUTO: 0.15 K/UL
IMM GRANULOCYTES NFR BLD AUTO: 2.2 %
LYMPHOCYTES # BLD AUTO: 0.3 K/UL
LYMPHOCYTES NFR BLD: 4.8 %
MAGNESIUM SERPL-MCNC: 2.1 MG/DL
MCH RBC QN AUTO: 28.8 PG
MCHC RBC AUTO-ENTMCNC: 31.3 G/DL
MCV RBC AUTO: 92 FL
MONOCYTES # BLD AUTO: 0.4 K/UL
MONOCYTES NFR BLD: 5.6 %
NEUTROPHILS # BLD AUTO: 5.9 K/UL
NEUTROPHILS NFR BLD: 87 %
NRBC BLD-RTO: 0 /100 WBC
PHOSPHATE SERPL-MCNC: 2.6 MG/DL
PLATELET # BLD AUTO: 162 K/UL
PMV BLD AUTO: 10.1 FL
POCT GLUCOSE: 155 MG/DL (ref 70–110)
POCT GLUCOSE: 212 MG/DL (ref 70–110)
POCT GLUCOSE: 225 MG/DL (ref 70–110)
POCT GLUCOSE: 247 MG/DL (ref 70–110)
POTASSIUM SERPL-SCNC: 3.1 MMOL/L
PROT SERPL-MCNC: 4.8 G/DL
RBC # BLD AUTO: 2.67 M/UL
SODIUM SERPL-SCNC: 130 MMOL/L
TACROLIMUS BLD-MCNC: 2.6 NG/ML
WBC # BLD AUTO: 6.81 K/UL

## 2018-11-20 PROCEDURE — 25000003 PHARM REV CODE 250: Performed by: PHYSICIAN ASSISTANT

## 2018-11-20 PROCEDURE — 99232 SBSQ HOSP IP/OBS MODERATE 35: CPT | Mod: ,,, | Performed by: SURGERY

## 2018-11-20 PROCEDURE — 25000003 PHARM REV CODE 250: Performed by: NURSE PRACTITIONER

## 2018-11-20 PROCEDURE — P9047 ALBUMIN (HUMAN), 25%, 50ML: HCPCS | Mod: JG | Performed by: PHYSICIAN ASSISTANT

## 2018-11-20 PROCEDURE — 25000003 PHARM REV CODE 250: Performed by: TRANSPLANT SURGERY

## 2018-11-20 PROCEDURE — 99232 SBSQ HOSP IP/OBS MODERATE 35: CPT | Mod: ,,, | Performed by: NURSE PRACTITIONER

## 2018-11-20 PROCEDURE — 94761 N-INVAS EAR/PLS OXIMETRY MLT: CPT

## 2018-11-20 PROCEDURE — 86850 RBC ANTIBODY SCREEN: CPT

## 2018-11-20 PROCEDURE — 94664 DEMO&/EVAL PT USE INHALER: CPT

## 2018-11-20 PROCEDURE — 80197 ASSAY OF TACROLIMUS: CPT

## 2018-11-20 PROCEDURE — 63600175 PHARM REV CODE 636 W HCPCS: Performed by: NURSE PRACTITIONER

## 2018-11-20 PROCEDURE — 97530 THERAPEUTIC ACTIVITIES: CPT

## 2018-11-20 PROCEDURE — 83735 ASSAY OF MAGNESIUM: CPT

## 2018-11-20 PROCEDURE — 84100 ASSAY OF PHOSPHORUS: CPT

## 2018-11-20 PROCEDURE — 99900035 HC TECH TIME PER 15 MIN (STAT)

## 2018-11-20 PROCEDURE — 36415 COLL VENOUS BLD VENIPUNCTURE: CPT

## 2018-11-20 PROCEDURE — 99233 SBSQ HOSP IP/OBS HIGH 50: CPT | Mod: 24,,, | Performed by: PHYSICIAN ASSISTANT

## 2018-11-20 PROCEDURE — 80053 COMPREHEN METABOLIC PANEL: CPT

## 2018-11-20 PROCEDURE — 20600001 HC STEP DOWN PRIVATE ROOM

## 2018-11-20 PROCEDURE — 86922 COMPATIBILITY TEST ANTIGLOB: CPT

## 2018-11-20 PROCEDURE — 63600175 PHARM REV CODE 636 W HCPCS: Mod: JG | Performed by: PHYSICIAN ASSISTANT

## 2018-11-20 PROCEDURE — 85025 COMPLETE CBC W/AUTO DIFF WBC: CPT

## 2018-11-20 RX ORDER — INSULIN ASPART 100 [IU]/ML
5-10 INJECTION, SOLUTION INTRAVENOUS; SUBCUTANEOUS
Status: DISCONTINUED | OUTPATIENT
Start: 2018-11-20 | End: 2018-11-23

## 2018-11-20 RX ORDER — POTASSIUM CHLORIDE 20 MEQ/1
40 TABLET, EXTENDED RELEASE ORAL ONCE
Status: DISCONTINUED | OUTPATIENT
Start: 2018-11-20 | End: 2018-11-20

## 2018-11-20 RX ORDER — ALBUMIN HUMAN 250 G/1000ML
25 SOLUTION INTRAVENOUS EVERY 8 HOURS
Status: COMPLETED | OUTPATIENT
Start: 2018-11-20 | End: 2018-11-21

## 2018-11-20 RX ORDER — LIDOCAINE HYDROCHLORIDE 10 MG/ML
1 INJECTION INFILTRATION; PERINEURAL ONCE
Status: COMPLETED | OUTPATIENT
Start: 2018-11-20 | End: 2018-11-20

## 2018-11-20 RX ORDER — ALBUMIN HUMAN 250 G/1000ML
25 SOLUTION INTRAVENOUS EVERY 8 HOURS
Status: DISCONTINUED | OUTPATIENT
Start: 2018-11-20 | End: 2018-11-20

## 2018-11-20 RX ADMIN — ENTECAVIR 0.5 MG: 0.5 TABLET ORAL at 08:11

## 2018-11-20 RX ADMIN — INSULIN ASPART 2 UNITS: 100 INJECTION, SOLUTION INTRAVENOUS; SUBCUTANEOUS at 06:11

## 2018-11-20 RX ADMIN — INSULIN ASPART 8 UNITS: 100 INJECTION, SOLUTION INTRAVENOUS; SUBCUTANEOUS at 08:11

## 2018-11-20 RX ADMIN — MAGNESIUM OXIDE TAB 400 MG (241.3 MG ELEMENTAL MG) 800 MG: 400 (241.3 MG) TAB at 08:11

## 2018-11-20 RX ADMIN — FUROSEMIDE 80 MG: 40 TABLET ORAL at 08:11

## 2018-11-20 RX ADMIN — DOCUSATE SODIUM 100 MG: 100 CAPSULE, LIQUID FILLED ORAL at 08:11

## 2018-11-20 RX ADMIN — BISACODYL 10 MG: 5 TABLET, COATED ORAL at 08:11

## 2018-11-20 RX ADMIN — MIDODRINE HYDROCHLORIDE 15 MG: 5 TABLET ORAL at 02:11

## 2018-11-20 RX ADMIN — TACROLIMUS 0.5 MG: 0.5 CAPSULE ORAL at 08:11

## 2018-11-20 RX ADMIN — URSODIOL 300 MG: 300 CAPSULE ORAL at 08:11

## 2018-11-20 RX ADMIN — ATOVAQUONE 1500 MG: 750 SUSPENSION ORAL at 09:11

## 2018-11-20 RX ADMIN — TACROLIMUS 0.5 MG: 0.5 CAPSULE ORAL at 05:11

## 2018-11-20 RX ADMIN — ALBUMIN HUMAN 25 G: 0.25 SOLUTION INTRAVENOUS at 12:11

## 2018-11-20 RX ADMIN — LIDOCAINE HYDROCHLORIDE 1 ML: 10 INJECTION, SOLUTION INFILTRATION; PERINEURAL at 12:11

## 2018-11-20 RX ADMIN — ALBUMIN HUMAN 25 G: 0.25 SOLUTION INTRAVENOUS at 08:11

## 2018-11-20 RX ADMIN — PREDNISONE 10 MG: 10 TABLET ORAL at 09:11

## 2018-11-20 RX ADMIN — PANTOPRAZOLE SODIUM 40 MG: 40 TABLET, DELAYED RELEASE ORAL at 08:11

## 2018-11-20 RX ADMIN — MYCOPHENOLATE MOFETIL 500 MG: 250 CAPSULE ORAL at 08:11

## 2018-11-20 RX ADMIN — INSULIN ASPART 2 UNITS: 100 INJECTION, SOLUTION INTRAVENOUS; SUBCUTANEOUS at 08:11

## 2018-11-20 RX ADMIN — FUROSEMIDE 80 MG: 40 TABLET ORAL at 05:11

## 2018-11-20 RX ADMIN — POLYETHYLENE GLYCOL 3350 17 G: 17 POWDER, FOR SOLUTION ORAL at 08:11

## 2018-11-20 RX ADMIN — PANTOPRAZOLE SODIUM 40 MG: 40 TABLET, DELAYED RELEASE ORAL at 09:11

## 2018-11-20 RX ADMIN — POTASSIUM BICARBONATE 50 MEQ: 25 TABLET, EFFERVESCENT ORAL at 02:11

## 2018-11-20 RX ADMIN — INSULIN ASPART 2 UNITS: 100 INJECTION, SOLUTION INTRAVENOUS; SUBCUTANEOUS at 12:11

## 2018-11-20 RX ADMIN — INSULIN ASPART 8 UNITS: 100 INJECTION, SOLUTION INTRAVENOUS; SUBCUTANEOUS at 12:11

## 2018-11-20 RX ADMIN — MIDODRINE HYDROCHLORIDE 15 MG: 5 TABLET ORAL at 08:11

## 2018-11-20 RX ADMIN — URSODIOL 300 MG: 300 CAPSULE ORAL at 09:11

## 2018-11-20 RX ADMIN — ASPIRIN 81 MG CHEWABLE TABLET 81 MG: 81 TABLET CHEWABLE at 08:11

## 2018-11-20 RX ADMIN — INSULIN ASPART 10 UNITS: 100 INJECTION, SOLUTION INTRAVENOUS; SUBCUTANEOUS at 06:11

## 2018-11-20 NOTE — ASSESSMENT & PLAN NOTE
- Fluctuating H&H.    - Transfusions: 10/14; 10/16; 10/18; 10/19; 10/22; 10/27; 10/29, 10/31, 11/3  - FOBT +  - LDH elevated, Hapto < 10.  - EGD 10/18: ulcerated duodenal mucosa.   - Consulted GI about this ongoing issue and further need of another EGD.  - EGD 11/5 unremarkable.   - Iron studies 11/13:  95, TIBC 107, sat iron 89, transferrin 72, ferritin pending  - Hemolytic anemia workup 11/13: Haptoglobin 132, retic 3.9   - H & H stable, no need for transfusion at this time  - Will continue to monitor closely.

## 2018-11-20 NOTE — SUBJECTIVE & OBJECTIVE
Interval History:   No acute events overnight, sitting in chair today. UOP decreased to 600 ml UOP overnight fluid balance not accurate. Had hepatic angiogram yesterday with complet occlusion. 24 ml of contrast used. sCr remins stable 2.0 mg/dL.     Review of patient's allergies indicates:  No Known Allergies  Current Facility-Administered Medications   Medication Frequency    acetaminophen tablet 650 mg Q6H PRN    albumin human 25% bottle 25 g Q8H    aspirin chewable tablet 81 mg Daily    atovaquone suspension 1,500 mg Daily    bisacodyl EC tablet 10 mg QHS    bisacodyl suppository 10 mg Daily PRN    calcium carbonate 200 mg calcium (500 mg) chewable tablet 500 mg TID PRN    dextrose 50% injection 12.5 g PRN    dextrose 50% injection 25 g PRN    docusate sodium capsule 100 mg Daily    entecavir tablet 0.5 mg Q72H    furosemide tablet 80 mg BID    glucagon (human recombinant) injection 1 mg PRN    glucose chewable tablet 16 g PRN    glucose chewable tablet 24 g PRN    insulin aspart U-100 pen 0-5 Units QID (AC + HS) PRN    insulin aspart U-100 pen 5-10 Units TIDWM    magnesium oxide tablet 800 mg BID    midodrine tablet 15 mg TID    mycophenolate capsule 500 mg BID    ondansetron disintegrating tablet 8 mg Q8H PRN    ondansetron injection 4 mg Q8H PRN    pantoprazole EC tablet 40 mg BID    polyethylene glycol packet 17 g Daily PRN    polyethylene glycol packet 17 g Daily    predniSONE tablet 10 mg Daily    Followed by    [START ON 11/24/2018] predniSONE tablet 5 mg Daily    Followed by    [START ON 12/1/2018] predniSONE tablet 2.5 mg Daily    sodium chloride 0.9% flush 3 mL PRN    tacrolimus capsule 0.5 mg BID    traMADol tablet 50 mg Q6H PRN    ursodiol capsule 300 mg BID       Objective:     Vital Signs (Most Recent):  Temp: 98.2 °F (36.8 °C) (11/20/18 1605)  Pulse: 97 (11/20/18 1605)  Resp: 17 (11/20/18 1605)  BP: 111/71 (11/20/18 1605)  SpO2: 95 % (11/20/18 1605)  O2 Device  (Oxygen Therapy): room air (11/20/18 1605) Vital Signs (24h Range):  Temp:  [96.4 °F (35.8 °C)-98.2 °F (36.8 °C)] 98.2 °F (36.8 °C)  Pulse:  [] 97  Resp:  [11-20] 17  SpO2:  [95 %-99 %] 95 %  BP: (103-124)/(57-77) 111/71     Weight: 63.2 kg (139 lb 5.3 oz) (11/19/18 0647)  Body mass index is 24.68 kg/m².  Body surface area is 1.68 meters squared.    I/O last 3 completed shifts:  In: 840 [P.O.:240; I.V.:600]  Out: 700 [Urine:700]    Physical Exam   Constitutional: She is oriented to person, place, and time. She appears well-developed. No distress.   Temporal and distal extremity muscle wasting   HENT:   Head: Normocephalic and atraumatic.   Eyes: EOM are normal. No scleral icterus.   Neck: Normal range of motion.   Cardiovascular: Normal rate and regular rhythm.   No murmur heard.  Pulmonary/Chest: Effort normal. No respiratory distress. She has no wheezes.   Abdominal: Bowel sounds are normal. She exhibits distension (improved from yesterday). There is tenderness. There is no guarding.   Chevron incision w/ steri strips, minimal serous leakage from R side   RLQ VALORIE drain sites c/d/i with sutures   Musculoskeletal: Normal range of motion. She exhibits edema (2+ BLE, + generalized).   Neurological: She is alert and oriented to person, place, and time.   Skin: Skin is warm. She is not diaphoretic.   Psychiatric: She has a normal mood and affect.   Nursing note and vitals reviewed.    Interval History:   No acute events overnight. UOP 1050 ml UOP overnight fluid balance -450 ml yesterday. sCr very slowly improving. Heptic angiogram was h elp todau Doppler US of liver today. She feels well.     Review of patient's allergies indicates:  No Known Allergies  Current Facility-Administered Medications   Medication Frequency    acetaminophen tablet 650 mg Q6H PRN    albumin human 25% bottle 25 g Q8H    aspirin chewable tablet 81 mg Daily    atovaquone suspension 1,500 mg Daily    bisacodyl EC tablet 10 mg QHS     bisacodyl suppository 10 mg Daily PRN    calcium carbonate 200 mg calcium (500 mg) chewable tablet 500 mg TID PRN    dextrose 50% injection 12.5 g PRN    dextrose 50% injection 25 g PRN    docusate sodium capsule 100 mg Daily    entecavir tablet 0.5 mg Q72H    furosemide tablet 80 mg BID    glucagon (human recombinant) injection 1 mg PRN    glucose chewable tablet 16 g PRN    glucose chewable tablet 24 g PRN    insulin aspart U-100 pen 0-5 Units QID (AC + HS) PRN    insulin aspart U-100 pen 5-10 Units TIDWM    magnesium oxide tablet 800 mg BID    midodrine tablet 15 mg TID    mycophenolate capsule 500 mg BID    ondansetron disintegrating tablet 8 mg Q8H PRN    ondansetron injection 4 mg Q8H PRN    pantoprazole EC tablet 40 mg BID    polyethylene glycol packet 17 g Daily PRN    polyethylene glycol packet 17 g Daily    predniSONE tablet 10 mg Daily    Followed by    [START ON 11/24/2018] predniSONE tablet 5 mg Daily    Followed by    [START ON 12/1/2018] predniSONE tablet 2.5 mg Daily    sodium chloride 0.9% flush 3 mL PRN    tacrolimus capsule 0.5 mg BID    traMADol tablet 50 mg Q6H PRN    ursodiol capsule 300 mg BID       Objective:     Vital Signs (Most Recent):  Temp: 98.2 °F (36.8 °C) (11/20/18 1605)  Pulse: 97 (11/20/18 1605)  Resp: 17 (11/20/18 1605)  BP: 111/71 (11/20/18 1605)  SpO2: 95 % (11/20/18 1605)  O2 Device (Oxygen Therapy): room air (11/20/18 1605) Vital Signs (24h Range):  Temp:  [96.4 °F (35.8 °C)-98.2 °F (36.8 °C)] 98.2 °F (36.8 °C)  Pulse:  [] 97  Resp:  [11-20] 17  SpO2:  [95 %-99 %] 95 %  BP: (103-124)/(57-77) 111/71     Weight: 63.2 kg (139 lb 5.3 oz) (11/19/18 0647)  Body mass index is 24.68 kg/m².  Body surface area is 1.68 meters squared.    I/O last 3 completed shifts:  In: 840 [P.O.:240; I.V.:600]  Out: 700 [Urine:700]    Physical Exam   Constitutional: She is oriented to person, place, and time. She appears well-developed. No distress.   Temporal and distal  extremity muscle wasting   HENT:   Head: Normocephalic and atraumatic.   Eyes: EOM are normal. No scleral icterus.   Neck: Normal range of motion.   Cardiovascular: Normal rate and regular rhythm.   No murmur heard.  Pulmonary/Chest: Effort normal. No respiratory distress. She has no wheezes.   Abdominal: Bowel sounds are normal. She exhibits distension (improved from yesterday). There is tenderness. There is no guarding.   Chevron incision w/ steri strips, minimal serous leakage from R side   RLQ VALORIE drain sites c/d/i with sutures   Musculoskeletal: Normal range of motion. She exhibits edema (2+ BLE, + generalized).   Neurological: She is alert and oriented to person, place, and time.   Skin: Skin is warm. She is not diaphoretic.   Psychiatric: She has a normal mood and affect.   Nursing note and vitals reviewed.      Significant Labs:  CBC:   Recent Labs   Lab 11/20/18  0814   WBC 6.81   RBC 2.67*   HGB 7.7*   HCT 24.6*      MCV 92   MCH 28.8   MCHC 31.3*     CMP:   Recent Labs   Lab 11/20/18  0814   *   CALCIUM 8.1*   ALBUMIN 2.5*   PROT 4.8*   *   K 3.1*   CO2 26   CL 90*   BUN 81*   CREATININE 2.0*   ALKPHOS 115   ALT 13   AST 17   BILITOT 1.2*     All labs within the past 24 hours have been reviewed.     Significant Imaging:  US Liver Transplant Impression       Abnormally decreased main hepatic artery peak systolic velocity with slow arterial flow and abnormal tardus parvus waveforms intrahepatically.  Findings concerning for developing hepatic artery stenosis/rejection.  Recommend continued close follow-up with consideration of possible contrast enhanced triple-phase abdominal CT for further evaluation if clinically indicated.    Peritransplant fluid collection as above.    Grossly stable ascites.    Right-sided pleural effusion.    This report was flagged in Epic as abnormal.    Electronically signed by resident: Ciro Love  Date: 11/15/2018  Time: 10:19    Electronically signed by:  Yvan Watkins MD  Date: 11/15/2018  Time: 10:29         Significant Labs:  CBC:   Recent Labs   Lab 11/20/18  0814   WBC 6.81   RBC 2.67*   HGB 7.7*   HCT 24.6*      MCV 92   MCH 28.8   MCHC 31.3*     CMP:   Recent Labs   Lab 11/20/18  0814   *   CALCIUM 8.1*   ALBUMIN 2.5*   PROT 4.8*   *   K 3.1*   CO2 26   CL 90*   BUN 81*   CREATININE 2.0*   ALKPHOS 115   ALT 13   AST 17   BILITOT 1.2*     All labs within the past 24 hours have been reviewed.     Significant Imaging:  US Liver Transplant Impression       Abnormally decreased main hepatic artery peak systolic velocity with slow arterial flow and abnormal tardus parvus waveforms intrahepatically.  Findings concerning for developing hepatic artery stenosis/rejection.  Recommend continued close follow-up with consideration of possible contrast enhanced triple-phase abdominal CT for further evaluation if clinically indicated.    Peritransplant fluid collection as above.    Grossly stable ascites.    Right-sided pleural effusion.    This report was flagged in Epic as abnormal.    Electronically signed by resident: Ciro Love  Date: 11/15/2018  Time: 10:19    Electronically signed by: Yvan Watkins MD  Date: 11/15/2018  Time: 10:29

## 2018-11-20 NOTE — PROGRESS NOTES
[facility]  Liver Transplant  Progress Note    Patient Name: Scarlett Reddy  MRN: [id number]  Admission Date: 10/1/2018  Hospital Length of Stay: 49 days  Code Status: Full Code  Primary Care Provider: Primary Doctor No  Post-Operative Day: 46    ORGAN:   LIVER  Disease Etiology: Cirrhosis: Type B and D  Donor Type:    - Brain Death  CDC High Risk:   No  Donor CMV Status:   Donor CMV Status: Positive  Donor HBcAB:   Negative  Donor HCV Status:   Negative  Donor HBV SETH: Negative  Donor HCV SETH: Negative  Whole or Partial: Whole Liver  Biliary Anastomosis: End to End  Arterial Anatomy: Replaced Left Hepatic and Right Hepatic  Subjective:     History of Present Illness:  Ms. Reddy is a 67 y/o female with PMH of ESLD secondary Hep B and D.  Listed for liver transplant with MELD 23.  Paracentesis 10/1 with 5L removed, no fluid sent for analysis.  Morning labs significant for hyponatremia, Na 119.  Pt admitted to LTS for sodium monitoring.      Hospital Course: 67 y/o F h/o HBV/delta cirrhosis s/p DBDLT 10/5/18 (CMV D+/R+, steroid induction, MMF/tacro/pred maintenance) with take back on 10/6 2/2 hyperbilirubinema and bilious VALORIE drainage, no leak identified. Post-op course c/b hypercapneic and hypoxic respiratory failure and was reintubated though quickly extubated now doing well. Liver bx (10/6) sig for Zone 3 hemorrhage and collapse, in addition to cholestasis. Transferred from ICU on POD #4. ID consulted to comment on positive diphtheroid culture from ascitic fluid (likely skin colonization) as well as ESBL Klebsiella pneumoniae in urine culture (10/4).  Pt asymptomatic and cell count from ascitic fluid unremarkable. Per ID recs, no need to treat diphtheroids or asymptomatic ESBL Kleb pneumo bacteriuria though pt has had 6 days of therapy which would cover these organisms. Mild peritransplant ascites- repeat US 10/6 with increased arterial resistive indices. Repeat US 10/9 w/ continued  elevation in RIs and fluid collections. LFTs trending down nicely.     Acute gradual worsening of renal function s/p OLTx. Creatinine on arrival 0.8 and has been up trending. Nephrology consulted for post-op EVA. Cr cont to trend up but remains to have good UOP. Had stable response to lasiz diuresis.  BUN elevated requiring multiple rounds of dialysis (10/15, 10/17, 10/20, 10/31). No significant improvements in kidney function noted.   In addition, pt H/H cont to fluctuate requiring multiple blood transfusions (10/14, 10/16, 10/18, 10/19, 10/22, 10/27, 10/29).  Pt reported dark stools 10/15 but color has normalized. FOBT+. EGD 10/18 consistent with ulcerated mucosa in duodenum s/p coagulation. Concern for H. Pylori. Started Amoxicillin/levofloxacin (10/19, complete 11/2)). Remains on Protonix 40 mg BID. Biopsy negative for infection and CMV. Will cont close monitoring H/H, transfuse as needed with goal Hb > 7.0. Of note, Urine cx + Klebsiella pneumoniae (10/13). Received 3 day course of ciprofloxacin, dc'd 10/19.   On 10/19 pm pt c/o crushing chest pain. Troponin 0.094, likely 2/2 ischemic stress. EKG NSR.   Liver US 11/6 showed no arterial flow. Paracentesis 11/7 with 8.9 L removed. Wbc 39, segs 16%. Gram stain no organisms seen + no WBC. Pt feels better since having fluid removed but she continues to have worsening abdominal distention & LE edema.Repeat Liver US 11/8 showed arterial flow with increased RIs. Paracentesis 11/12 with 8.6L removed (WBC 34, segs 25%).   11/15 liver u/s showed abnormally decreased main hepatic artery peak systolic velocity with slow arterial flow and abnormal tardus parvus waveforms intrahepatically. Consulted vascular surgery. Repeat liver US 11/19 showed no arterial flow on R and sluggish flow on L. Hepatic angiogram 11/19 confirmed HAT.    Interval history: No acute events overnight. Middle of chevron incision packed after being opened on Friday due to purulent drainage. Wound care  following. Today, it also was noted that the R lateral side of chevron had 3 small openings with purulent drainage. Opened area and packed w/ aquacel. Drainage from incision site grew Klebsiella. No intervention at this time. CT abd/pelvis 11/17 with no signs of infection. Remains afebrile. VSS. Pt still has significant abdominal distention and LE edema. However, renal fx continues to show improvement with Cr down to 2.0 this am. Adequate UOP. Will give albumin x 3, continue Lasix 80 mg BID, and plan for paracentesis to remove 4L tomorrow. WBC improving. Cellcept restarted at 500 mg bid on 11/6. Most recent CMV PCR 11/15 with 87 copies. Continue to hold Valcyte. Monitor levels with weekly CMV PCR. H/H stable. Heparin dc'd 11/17. Replaced K. Monitor.    Scheduled Meds:   albumin human 25%  25 g Intravenous Q8H    aspirin  81 mg Oral Daily    atovaquone  1,500 mg Oral Daily    bisacodyl  10 mg Oral QHS    docusate sodium  100 mg Oral Daily    entecavir  0.5 mg Oral Q72H    furosemide  80 mg Oral BID    insulin aspart U-100  8 Units Subcutaneous TIDWM    magnesium oxide  800 mg Oral BID    midodrine  15 mg Oral TID    mycophenolate  500 mg Oral BID    pantoprazole  40 mg Oral BID    polyethylene glycol  17 g Oral Daily    potassium bicarbonate  50 mEq Oral Once    predniSONE  10 mg Oral Daily    Followed by    [START ON 11/24/2018] predniSONE  5 mg Oral Daily    Followed by    [START ON 12/1/2018] predniSONE  2.5 mg Oral Daily    tacrolimus  0.5 mg Oral BID    ursodiol  300 mg Oral BID     Continuous Infusions:  PRN Meds:acetaminophen, bisacodyl, calcium carbonate, dextrose 50%, dextrose 50%, glucagon (human recombinant), glucose, glucose, insulin aspart U-100, ondansetron, ondansetron, polyethylene glycol, sodium chloride 0.9%, traMADol    Review of Systems   Constitutional: Positive for activity change, appetite change (decreased) and fatigue. Negative for chills and fever.   HENT: Negative for  congestion, facial swelling and trouble swallowing.    Eyes: Negative for pain, discharge and visual disturbance.   Respiratory: Negative for cough, chest tightness, shortness of breath and wheezing.    Cardiovascular: Positive for leg swelling. Negative for chest pain and palpitations.   Gastrointestinal: Positive for abdominal distention, abdominal pain and nausea. Negative for constipation, diarrhea and vomiting.   Endocrine: Negative.    Genitourinary: Negative for decreased urine volume, difficulty urinating and dysuria.   Musculoskeletal: Negative for arthralgias, back pain, neck pain and neck stiffness.   Skin: Positive for wound.   Allergic/Immunologic: Positive for immunocompromised state.   Neurological: Positive for weakness. Negative for tremors and headaches.   Psychiatric/Behavioral: Negative for confusion and sleep disturbance.     Objective:     Vital Signs (Most Recent):  Temp: 97.7 °F (36.5 °C) (11/20/18 0715)  Pulse: 100 (11/20/18 0715)  Resp: 13 (11/20/18 0715)  BP: 104/66 (11/20/18 0715)  SpO2: 95 % (11/20/18 0715) Vital Signs (24h Range):  Temp:  [96.4 °F (35.8 °C)-97.8 °F (36.6 °C)] 97.7 °F (36.5 °C)  Pulse:  [] 100  Resp:  [11-20] 13  SpO2:  [95 %-100 %] 95 %  BP: ()/(52-78) 104/66     Weight: 63.2 kg (139 lb 5.3 oz)  Body mass index is 24.68 kg/m².    Intake/Output - Last 3 Shifts       11/18 0700 - 11/19 0659 11/19 0700 - 11/20 0659 11/20 0700 - 11/21 0659    P.O. 600      I.V. (mL/kg)  600 (9.5)     Total Intake(mL/kg) 600 (9.5) 600 (9.5)     Urine (mL/kg/hr) 1050 (0.7)      Stool 0      Total Output 1050      Net -450 +600            Stool Occurrence 1 x            Physical Exam   Constitutional: She is oriented to person, place, and time. She appears well-developed. No distress.   Temporal and distal extremity muscle wasting   HENT:   Head: Normocephalic and atraumatic.   Eyes: EOM are normal. No scleral icterus.   Neck: Normal range of motion.   Cardiovascular: Normal rate,  regular rhythm and normal heart sounds.   No murmur heard.  Pulmonary/Chest: Effort normal and breath sounds normal. No respiratory distress. She has no wheezes.   Abdominal: Soft. Bowel sounds are normal. She exhibits distension (slowly improving). There is tenderness. There is no guarding.   Purulent drainage expressed from middle chevron incision + R lateral side, both areas with packing in place.  Minimal tissue erythema  RLQ VALORIE drain sites c/d/i with sutures   Musculoskeletal: Normal range of motion. She exhibits edema (3+ BLE, + generalized).   Neurological: She is alert and oriented to person, place, and time.   Skin: Skin is warm. She is not diaphoretic.   Psychiatric: She has a normal mood and affect.   Nursing note and vitals reviewed.      Laboratory:  Immunosuppressants         Stop Route Frequency     tacrolimus capsule 0.5 mg      -- Oral 2 times daily     mycophenolate capsule 500 mg      -- Oral 2 times daily        CBC:   Recent Labs   Lab 11/20/18  0814   WBC 6.81   RBC 2.67*   HGB 7.7*   HCT 24.6*      MCV 92   MCH 28.8   MCHC 31.3*     CMP:   Recent Labs   Lab 11/20/18  0814   *   CALCIUM 8.1*   ALBUMIN 2.5*   PROT 4.8*   *   K 3.1*   CO2 26   CL 90*   BUN 81*   CREATININE 2.0*   ALKPHOS 115   ALT 13   AST 17   BILITOT 1.2*     Labs within the past 24 hours have been reviewed.    Diagnostic Results:  I have personally reviewed all pertinent imaging studies.    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   - POD 1 US: increased arterial resistive indices, suggestive of edema vs acute rejection vs congestion.  - Repeat US 10/9 with continued elevation of RIs.  - LFTs stable.  - T bili trending down.   - Repeat liver US (10/11): elevated RIs.  - Liver US 11/6 to assess for ascites. No arterial flow within the  "liver allograft concerning for interval thrombosis/occlusion of the arterial system. Severe ascites seen.  - Liver US 11/8: arterial flow seen w/ elevated RIs   - LFTs remain stable. No CTA at this time due to EVA and normal liver function.   - Liver US 11/15 showed "abnormally decreased main hepatic artery peak systolic velocity with slow arterial flow and abnormal tardus parvus waveforms intrahepatically."   -Vascular surgery consulted  -CT abd/pelvis 11/17- no signs of infection  - Liver US 11/19 showed no arterial flow on the right and sluggish flow on the left.  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 No further intervention planned at this time. Will monitor.         Hypokalemia    - K 3.1 - replaced  - Continue to monitor     Hypophosphatemia    - Monitor & replace as needed.       Delayed surgical wound healing    - Middle chevron incision opened 11/16 with purulent drainage  - Packed with Aquacel ag at this time.   - Fluid cultured- positive for Klebsiella p. No intervention at this time    middle chevron incision opened with purulent drainage. Packed w/ aquacel. Growing Klebsiella p.   - 11/17: R side of chevron incision w/ 3 small holes draining purulent fluid, opened and packed w/ aquacel.   - Wound care following. Appreciate recs          Hepatic artery thrombosis of transplanted liver    - Liver US 11/19 showed no arterial flow to R and sluggish flow to L  - Hepatic angiogram 11/19 confirmed HAT  - No further intervention planned at this time. Will continue to monitor.     Thrombocytopenia, unspecified    - Improving.  - Monitor.       Alteration in skin integrity    Monitor.       Leukopenia    - Stopped Cellcept, Bactrim, and Valcyte 10/31 2/2 leukopenia  - Started Atovaquone 11/6.   - CMV PCR noted to be positive at 129. Will hold off on treatment with valcyte at this time.  - WBC improving   - Continue to monitor.       Azotemia    - CRRT 10/31 for uremic toxin clearance.         " "  UTI (urinary tract infection)    - Urine Cx 10/15 w/ Klebsiella pneumonia.  - Started Ciprofloxacin (10/17-10/19).   - Started on amoxicillin/levo for H. Pylori (completed abx 11/2).  - Pt asymptomatic at this time.       Acute blood loss anemia    - H&H cont to fluctuate.   - FOBT +.  - EGD 10/18 - showed diffuse bleeding with ulcerated duodenal mucosa.  - EGD 11/5 - improvement seen, no active bleeding.  - 1 unit PRBCs transfused 10/27 and 11/3.   - H&H with good response.   - Resume heparin sq injections 11/6.   - Decrease heparin sq from TID to BID 11/15   - heparin dc'd 11/17  - See "anemia requiring transfusions."  - Continue to monitor.             Post LT - Acute renal failure    - Creatinine on arrival 0.8 and trended up.  - Nephrology consulted.  - Renal US (10/11): no hydronephrosis.  - Echo 10/12: diastolic dysfunction, likely 2/2 to ARF.  - CRRT 10/14; SLED 10/17, 10/20. --> no improvements noted in kidney fxn.  - 24 hr urine creatine collection: CrCl of 7.   - CRRT 10/31 with 1L removed.   - See "hypervolemia associated with renal insufficiency"     Hypervolemia associated with renal insufficiency    - CRRT 10/14, 10/17, 10/20, 10/31.  - CXR 10/30: abundant fluid in b/l pleural space.  - Continue 80 mg Lasix BID.  - Monitor daily weights.       EVA (acute kidney injury)    - No plans for RRT in near future, per nephrology  - Trialysis line removed 11/13  - Nephrology involved. Appreciate recs.  - Renal fx showing improvement   - Monitor.     Metabolic acidosis    - CRRT 10/14.  - SLED 10/17, 10/20, 10/31.         At risk for opportunistic infections    - Valcyte for CMV prophylaxis--> holding (10/31) for leukopenia. Pt will need weekly CMV PCR while discontinued.  - CMV PCR noted to be positive at 129. .  - CMV PCR 11/8: 105  - CMV PCR 11/15 with 89 copies  - Next CMV PCR due 11/22  - Bactrim for PCP prophylaxis (MWF).--> holding (10/31) for leukopenia.   - Start Atovaquone 11/6.  - Fluconazole for " fungal prophylaxis (first dose 10/6). D/c Fluc 11/16 bc pt had been on it for > 30 days post take back.       Long-term use of immunosuppressant medication    - Prograf: stopped 10/29. Resumed 11/2.  --> Will monitor for signs of toxic side effects, check daily troughs, and change meds accordingly.   - Prednisone: stopped 10/29. Started on Hydrocortisone. pred taper resumed.  - Cellcept: stopped 10/31 for leukopenia. Restarted Cellcept 500 mg bid, 11/6.         Prophylactic immunotherapy    - See long term use immunosuppresion.       Ascites    - Paracentesis 10/1, 5L removed, no fluid sent for analysis.  - Pt remains volume overloaded.   - Liver US 11/6 with severe ascites.   - Paracentesis 11/7 with 8.9 L removed. Wbc 39, segs 16%. Gram stain - no organisms seen and no WBC. Cultures NGTD.  - Paracentesis 11/12: 8.6 L removed. 34 wbc, 25% segs, 33 lymphs, 42 monocytes/macrophages. Cultures NGTD  - Paracentesis planned for 11/21 to remove 4 L.     Weakness    - See physical deconditioning.       Physical deconditioning    - PT/OT consulted.  - Recommend home health PT and rolling walker at discharge (orders placed 11/6).       Hyponatremia    - Na 119 on admit.  - Na was improving post transplant w HD.  - Na stable at this time.              Hypotension    - continue Midodrine 15 mg TID. Monitor       Anemia requiring transfusions    - Fluctuating H&H.    - Transfusions: 10/14; 10/16; 10/18; 10/19; 10/22; 10/27; 10/29, 10/31, 11/3  - FOBT +  - LDH elevated, Hapto < 10.  - EGD 10/18: ulcerated duodenal mucosa.   - Consulted GI about this ongoing issue and further need of another EGD.  - EGD 11/5 unremarkable.   - Iron studies 11/13:  95, TIBC 107, sat iron 89, transferrin 72, ferritin pending  - Hemolytic anemia workup 11/13: Haptoglobin 132, retic 3.9   - H & H stable, no need for transfusion at this time  - Will continue to monitor closely.           Severe malnutrition    - Dietary consulted.   - Temporal  and distal extremity muscle wasting and edema.  - Encourage supplements and to increase nutritional intake.  - Per nephrology, pt to be on low protein diet.   - Prealbumin reviewed.      Type 2 diabetes mellitus with diabetic polyneuropathy, with long-term current use of insulin    - Continue home regimen.  - Endocrine consulted.  - Pt off insulin drip at this time.  Apprec endo recs.        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  - Tx w/ Entecavir- dose changed to q72 hours given renal function.             VTE Risk Mitigation (From admission, onward)        Ordered     IP VTE HIGH RISK PATIENT  Once      11/05/18 1145     Place sequential compression device  Until discontinued      10/05/18 0709          The patients clinical status was discussed at multidisplinary rounds, involving transplant surgery, transplant medicine, pharmacy, nursing, nutrition, and social work    Discharge Planning:  No Patient Care Coordination Note on file.      Elizabeth Dean PA-C  Liver Transplant  [facility]

## 2018-11-20 NOTE — SUBJECTIVE & OBJECTIVE
Scheduled Meds:   albumin human 25%  25 g Intravenous Q8H    aspirin  81 mg Oral Daily    atovaquone  1,500 mg Oral Daily    bisacodyl  10 mg Oral QHS    docusate sodium  100 mg Oral Daily    entecavir  0.5 mg Oral Q72H    furosemide  80 mg Oral BID    insulin aspart U-100  8 Units Subcutaneous TIDWM    magnesium oxide  800 mg Oral BID    midodrine  15 mg Oral TID    mycophenolate  500 mg Oral BID    pantoprazole  40 mg Oral BID    polyethylene glycol  17 g Oral Daily    potassium bicarbonate  50 mEq Oral Once    predniSONE  10 mg Oral Daily    Followed by    [START ON 11/24/2018] predniSONE  5 mg Oral Daily    Followed by    [START ON 12/1/2018] predniSONE  2.5 mg Oral Daily    tacrolimus  0.5 mg Oral BID    ursodiol  300 mg Oral BID     Continuous Infusions:  PRN Meds:acetaminophen, bisacodyl, calcium carbonate, dextrose 50%, dextrose 50%, glucagon (human recombinant), glucose, glucose, insulin aspart U-100, ondansetron, ondansetron, polyethylene glycol, sodium chloride 0.9%, traMADol    Review of Systems   Constitutional: Positive for activity change, appetite change (decreased) and fatigue. Negative for chills and fever.   HENT: Negative for congestion, facial swelling and trouble swallowing.    Eyes: Negative for pain, discharge and visual disturbance.   Respiratory: Negative for cough, chest tightness, shortness of breath and wheezing.    Cardiovascular: Positive for leg swelling. Negative for chest pain and palpitations.   Gastrointestinal: Positive for abdominal distention, abdominal pain and nausea. Negative for constipation, diarrhea and vomiting.   Endocrine: Negative.    Genitourinary: Negative for decreased urine volume, difficulty urinating and dysuria.   Musculoskeletal: Negative for arthralgias, back pain, neck pain and neck stiffness.   Skin: Positive for wound.   Allergic/Immunologic: Positive for immunocompromised state.   Neurological: Positive for weakness. Negative for  tremors and headaches.   Psychiatric/Behavioral: Negative for confusion and sleep disturbance.     Objective:     Vital Signs (Most Recent):  Temp: 97.7 °F (36.5 °C) (11/20/18 0715)  Pulse: 100 (11/20/18 0715)  Resp: 13 (11/20/18 0715)  BP: 104/66 (11/20/18 0715)  SpO2: 95 % (11/20/18 0715) Vital Signs (24h Range):  Temp:  [96.4 °F (35.8 °C)-97.8 °F (36.6 °C)] 97.7 °F (36.5 °C)  Pulse:  [] 100  Resp:  [11-20] 13  SpO2:  [95 %-100 %] 95 %  BP: ()/(52-78) 104/66     Weight: 63.2 kg (139 lb 5.3 oz)  Body mass index is 24.68 kg/m².    Intake/Output - Last 3 Shifts       11/18 0700 - 11/19 0659 11/19 0700 - 11/20 0659 11/20 0700 - 11/21 0659    P.O. 600      I.V. (mL/kg)  600 (9.5)     Total Intake(mL/kg) 600 (9.5) 600 (9.5)     Urine (mL/kg/hr) 1050 (0.7)      Stool 0      Total Output 1050      Net -450 +600            Stool Occurrence 1 x            Physical Exam   Constitutional: She is oriented to person, place, and time. She appears well-developed. No distress.   Temporal and distal extremity muscle wasting   HENT:   Head: Normocephalic and atraumatic.   Eyes: EOM are normal. No scleral icterus.   Neck: Normal range of motion.   Cardiovascular: Normal rate, regular rhythm and normal heart sounds.   No murmur heard.  Pulmonary/Chest: Effort normal and breath sounds normal. No respiratory distress. She has no wheezes.   Abdominal: Soft. Bowel sounds are normal. She exhibits distension (slowly improving). There is tenderness. There is no guarding.   Purulent drainage expressed from middle chevron incision + R lateral side, both areas with packing in place.  Minimal tissue erythema  RLQ VALORIE drain sites c/d/i with sutures   Musculoskeletal: Normal range of motion. She exhibits edema (3+ BLE, + generalized).   Neurological: She is alert and oriented to person, place, and time.   Skin: Skin is warm. She is not diaphoretic.   Psychiatric: She has a normal mood and affect.   Nursing note and vitals  reviewed.      Laboratory:  Immunosuppressants         Stop Route Frequency     tacrolimus capsule 0.5 mg      -- Oral 2 times daily     mycophenolate capsule 500 mg      -- Oral 2 times daily        CBC:   Recent Labs   Lab 11/20/18  0814   WBC 6.81   RBC 2.67*   HGB 7.7*   HCT 24.6*      MCV 92   MCH 28.8   MCHC 31.3*     CMP:   Recent Labs   Lab 11/20/18  0814   *   CALCIUM 8.1*   ALBUMIN 2.5*   PROT 4.8*   *   K 3.1*   CO2 26   CL 90*   BUN 81*   CREATININE 2.0*   ALKPHOS 115   ALT 13   AST 17   BILITOT 1.2*     Labs within the past 24 hours have been reviewed.    Diagnostic Results:  I have personally reviewed all pertinent imaging studies.

## 2018-11-20 NOTE — ASSESSMENT & PLAN NOTE
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   - POD 1 US: increased arterial resistive indices, suggestive of edema vs acute rejection vs congestion.  - Repeat US 10/9 with continued elevation of RIs.  - LFTs stable.  - T bili trending down.   - Repeat liver US (10/11): elevated RIs.  - Liver US 11/6 to assess for ascites. No arterial flow within the liver allograft concerning for interval thrombosis/occlusion of the arterial system. Severe ascites seen.  - Liver US 11/8: arterial flow seen w/ elevated RIs   - LFTs remain stable. No CTA at this time due to EVA and normal liver function.   - Liver US 11/15 showed "abnormally decreased main hepatic artery peak systolic velocity with slow arterial flow and abnormal tardus parvus waveforms intrahepatically."   -Vascular surgery consulted  -CT abd/pelvis 11/17- no signs of infection  - Liver US 11/19 showed no arterial flow on the right and sluggish flow on the left.  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 No further intervention planned at this time. Will monitor.      "

## 2018-11-20 NOTE — PT/OT/SLP PROGRESS
Physical Therapy Treatment    Patient Name:  Scarlett Reddy   MRN:  [id number]    Recommendations:     Discharge Recommendations:  home health PT, home health OT   Discharge Equipment Recommendations: walker, rolling   Barriers to discharge: Decreased caregiver support at current functional level    Assessment:     Scarlett Reddy is a 68 y.o. female admitted with a medical diagnosis of Liver transplanted.  She presents with the following impairments/functional limitations:  weakness, gait instability, impaired endurance, impaired balance, decreased safety awareness, impaired functional mobilty, impaired self care skills, pain, edema, decreased lower extremity function. Pt able to complete sit>stand transfer with less assist this date. Ambulating approx household distance but unable to progress further 2* generalized weakness and impaired endurance with fatigue noted at end of gait. Pt would continue to benefit from skilled acute PT in order to address current deficits and progress functional mobility.     Rehab Prognosis:  good; patient would benefit from acute skilled PT services to address these deficits and reach maximum level of function.      Recent Surgery: Procedure(s) (LRB):  ANGIOGRAM-HEPATIC (Left) 1 Day Post-Op    Plan:     During this hospitalization, patient to be seen 4 x/week to address the above listed problems via gait training, therapeutic activities, therapeutic exercises, neuromuscular re-education  · Plan of Care Expires:  12/15/18   Plan of Care Reviewed with: patient, spouse    Subjective     Communicated with RN prior to session.  Patient found UIC upon PT entry to room, agreeable to treatment.      Chief Complaint: abdominal pain   Pain/Comfort:  · Pain Rating 1: (reported abdominal pain but did not rate)    Patients cultural, spiritual, Restoration conflicts given the current situation: none noted     Objective:     Patient found with: telemetry, pulse ox (continuous)     General Precautions:  Standard, fall, contact   Orthopedic Precautions:N/A   Braces: N/A     Functional Mobility:  · Transfers:     · Sit to Stand:  minimum assistance and of 2 persons with rolling walker  · Gait: 112 ft. with RW and CGA  ? Multiple, intermittent standing rest breaks throughout gait  ? Decreased giuseppe, decreased step length, impaired weight-shifting ability, decreased toe-floor clearance   ? Noted fatigue towards the end of gait      AM-PAC 6 CLICK MOBILITY  Turning over in bed (including adjusting bedclothes, sheets and blankets)?: 3  Sitting down on and standing up from a chair with arms (e.g., wheelchair, bedside commode, etc.): 2  Moving from lying on back to sitting on the side of the bed?: 2  Moving to and from a bed to a chair (including a wheelchair)?: 3  Need to walk in hospital room?: 3  Climbing 3-5 steps with a railing?: 2  Basic Mobility Total Score: 15       Patient left up in chair with all lines intact, call button in reach and pt's  present..    GOALS:   Multidisciplinary Problems     Physical Therapy Goals        Problem: Physical Therapy Goal    Goal Priority Disciplines Outcome Goal Variances Interventions   Physical Therapy Goal     PT, PT/OT Ongoing (interventions implemented as appropriate)     Description:  Goals to be met by: 2018    Patient will increase functional independence with mobility by performin. Supine to sit with Contact Guard Assistance - not met  2. Sit to stand transfer with Supervision using LRAD or no AD - not met  3. Bed to chair transfer with Supervision using LRAD or no AD - not met  4. Gait  x 250 feet with Supervision using LRAD or no AD - not met  5. Stand for 3 minutes with Supervision to increase activity tolerance - not met   6. Lower extremity exercise program x15 reps per handout, with independence - not met                           Time Tracking:     PT Received On: 18  PT Start Time: 933     PT Stop Time: 951  PT Total Time (min): 18  min     Billable Minutes: Therapeutic Activity 18    Treatment Type: Treatment  PT/PTA: PT     PTA Visit Number: 0     Radha Nguyen PT, DPT   11/20/2018  [phone]

## 2018-11-20 NOTE — OP NOTE
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 Selective hepatic arteriogram  2. US guided L brachial access    Surgeon: CAMMY Edgar III, MD     Assistants: Nilay Nino M.D.     Anesthesia: Local/MAC    Findings/Key elements:   HAT, could not be crossed;  Dr Theodore present    4 fr L brachial/manual compression; 13/7 min fluoro;436 mGy; 24 cc visipaque    Implants: * No implants in log *          Condition: stable    Indications: 68 y F with s/p liver transplant on 10/4/18, now with liver ultrasound concerning for possible hepatic artery thrombosis    Procedure Details:  On pre-op evaluation, the patient did not have a palpable radial pulse. Using ultrasound guidance, the L brachial artery vessel patency was confirmed. Then under direct ultrasound guidance the L brachial was entered with a 21-G needle, Mandril wire and micropuncture sheath. Then under fluoroscopic guidance, an 0.035-in wire was placed into the aortic arch, and and the sheath replaced with a short 4-Fr sheath. A pigtail catheter was used to acces the descending thoracic aorta, and the wire advanced into the distal aorta. The sheath was then exchanged over the wire for a 4x75 sheath, which was positioned proximal to the celiac origin. The patient was systemically heparinized.   The orifice of the celiac axis was engaged with the guiding catheter and angiogram was performed to confirm our location.  The angiogram also revealed a large GDA and thrombosed hepatic artery near the origin. A Choice PT wire was advanced into the hepatic artery, but we were unable to pass it through the thrombosis despite multiple attempts. Dr Theodore was present for this.    All wires and catheters removed. Systemic heparinization was reversed with IV protamine slowly. Manual pressure was applied to the access site. Excellent hemostasis noted. No hematoma noted. Patient  tolerated procedure well.      EBL:  <4 mL           Complications:  None; patient tolerated the procedure well.           Disposition: Stable to recovery.      Dr. Edgar was scrubbed and present for the entire procedure.    Nilay Nino MD   Fellow, Vascular/Endovascular Surgery

## 2018-11-20 NOTE — PROGRESS NOTES
Consulted for chevron incision after opening  Small wound noted to middle of chevron incision which tracts along staple line 2cm in both directions.   Wound packed with Aquacel AG silver hydrofiber and will change every other day.  Noted 3 small openings to right lateral aspect of incision line draining creamy yellow purulence. Called Elizabeth Dean PA-C to notify and ask if team would like to open area to facilitate packing . Left Aquacel ribbon at bedside.      11/20/18 1000       Incision/Site 10/06/18 1509 Abdomen transverse   Date First Assessed/Time First Assessed: 10/06/18 1509   Location: Abdomen  Orientation: transverse   Wound Image    Incision WDL ex   Dressing Appearance Moist drainage;Intact   Drainage Amount Moderate   Drainage Characteristics/Odor Creamy;Yellow;No odor   Appearance Pink;Yellow;Moist   Red (%), Wound Tissue Color 50 %  (pink)   Yellow (%), Wound Tissue Color 50 %   Periwound Area Intact   Wound Edges Open   Wound Length (cm) 0.3 cm   Wound Width (cm) 1.5 cm   Wound Depth (cm) 0.5 cm   Wound Volume (cm^3) 0.22 cm^3   Tunneling (depth (cm)/location) tracts 2cm at 9 and 3 o'clock   Care Cleansed with:;Sterile normal saline   Dressing Applied;Hydrofiber;Silver;Island/border  (noted exudate oozing from 3 openings  on right lateral aspec)     Urban Patel RN CWON  [id number]

## 2018-11-20 NOTE — ASSESSMENT & PLAN NOTE
- Valcyte for CMV prophylaxis--> holding (10/31) for leukopenia. Pt will need weekly CMV PCR while discontinued.  - CMV PCR noted to be positive at 129. .  - CMV PCR 11/8: 105  - CMV PCR 11/15 with 89 copies  - Next CMV PCR due 11/22  - Bactrim for PCP prophylaxis ([facility]).--> holding (10/31) for leukopenia.   - Start Atovaquone 11/6.  - Fluconazole for fungal prophylaxis (first dose 10/6). D/c Fluc 11/16 bc pt had been on it for > 30 days post take back.

## 2018-11-20 NOTE — SUBJECTIVE & OBJECTIVE
Medications:  Continuous Infusions:  Scheduled Meds:   aspirin  81 mg Oral Daily    atovaquone  1,500 mg Oral Daily    bisacodyl  10 mg Oral QHS    docusate sodium  100 mg Oral Daily    entecavir  0.5 mg Oral Q72H    furosemide  80 mg Oral BID    insulin aspart U-100  8 Units Subcutaneous TIDWM    magnesium oxide  800 mg Oral BID    midodrine  15 mg Oral TID    mycophenolate  500 mg Oral BID    pantoprazole  40 mg Oral BID    polyethylene glycol  17 g Oral Daily    predniSONE  10 mg Oral Daily    Followed by    [START ON 11/24/2018] predniSONE  5 mg Oral Daily    Followed by    [START ON 12/1/2018] predniSONE  2.5 mg Oral Daily    tacrolimus  0.5 mg Oral BID    ursodiol  300 mg Oral BID     PRN Meds:acetaminophen, bisacodyl, calcium carbonate, dextrose 50%, dextrose 50%, glucagon (human recombinant), glucose, glucose, insulin aspart U-100, ondansetron, ondansetron, polyethylene glycol, sodium chloride 0.9%, traMADol     Objective:     Vital Signs (Most Recent):  Temp: 97.8 °F (36.6 °C) (11/19/18 2000)  Pulse: 100 (11/20/18 0400)  Resp: 12 (11/20/18 0400)  BP: 115/71 (11/20/18 0400)  SpO2: 97 % (11/20/18 0400) Vital Signs (24h Range):  Temp:  [96.4 °F (35.8 °C)-97.9 °F (36.6 °C)] 97.8 °F (36.6 °C)  Pulse:  [] 100  Resp:  [11-20] 12  SpO2:  [95 %-100 %] 97 %  BP: ()/(52-78) 115/71          Physical Exam   Constitutional: She appears well-developed. No distress.   Temporal and distal extremity muscle wasting   HENT:   Head: Normocephalic and atraumatic.   Eyes: EOM are normal. No scleral icterus.   Neck: Normal range of motion.   Cardiovascular: Normal rate and regular rhythm.   No murmur heard.  Left brachial palpable pulse, soft, no hematoma   Pulmonary/Chest: Effort normal.   Abdominal: Soft. She exhibits distension. There is tenderness. There is no guarding.   Incision with packing in place.    Musculoskeletal: Normal range of motion. She exhibits edema (2+ BLE, + generalized).    Neurological: She is alert.   Skin: Skin is warm.       Significant Labs:  CBC:   Recent Labs   Lab 11/19/18  0815   WBC 5.83   RBC 2.79*   HGB 8.0*   HCT 25.6*   *   MCV 92   MCH 28.7   MCHC 31.3*     CMP:   Recent Labs   Lab 11/19/18  0814   *   CALCIUM 8.7   ALBUMIN 2.7*   PROT 5.1*   *   K 3.6   CO2 25   CL 92*   BUN 93*   CREATININE 2.1*   ALKPHOS 123   ALT 19   AST 19   BILITOT 1.3*

## 2018-11-20 NOTE — ASSESSMENT & PLAN NOTE
- Middle chevron incision opened 11/16 with purulent drainage  - Packed with Aquacel ag at this time.   - Fluid cultured- positive for Klebsiella p. No intervention at this time    middle chevron incision opened with purulent drainage. Packed w/ aquacel. Growing Klebsiella p.   - 11/17: R side of chevron incision w/ 3 small holes draining purulent fluid, opened and packed w/ aquacel.   - Wound care following. Appreciate recs

## 2018-11-20 NOTE — PLAN OF CARE
Problem: Patient Care Overview  Goal: Plan of Care Review  Outcome: Ongoing (interventions implemented as appropriate)  Pt AAOx4, VSS, in NAD throughout shift.  Pt up to restroom with standby/+1 assist to stand and then walker/independent to restroom.  Pt ambulated in halls x1 with PT, tolerated both well.  Pt's abdominal incision was opened and packed per NP and PA this shift, tolerated well.  Pt tolerating all medications and interventions well.  Pt's BG has been > 200 so far this shift, see results review and MAR for doses of insulin administered.  Pt tolerating well.  Pt denying pain throughout shift.  Pt has +2-3 edema to BLEs, pt wearing OLAF hose throughout shift.  RN maintaining fall risk precautions throughout shift.  Pt denies pain or other need at this time; RN will continue to monitor, assess, and alter plan of care as needed until report given to oncoming night shift nurse.

## 2018-11-20 NOTE — PROGRESS NOTES
"[facility]  Endocrinology  Progress Note    Admit Date: 10/1/2018     Reason for Consult: Management of type 2 DM, Hyperglycemia      Surgical Procedure and Date: Liver transplant 10/5/18, Ex lap 10/6/18     Diabetes diagnosis year:      Home Diabetes Medications:  Lantus 18 units HS and novolog 17 units with meals plus correction scale (150-200 +1)        Lab Results   Component Value Date     HGBA1C 6.8 (H) 2018        How often checking glucose at home? 3-4   BG readings on regimen: 120-150.  Post prandial readings as high as upper 200s  Hypoglycemia on the regimen? yes, none recently   Missed doses on regimen?  No     Diabetes Complications include:     Diabetic peripheral neuropathy      Complicating diabetes co morbidities:   CIRRHOSIS        HPI:  Patient is a 68 y.o. female with a diagnosis of ESLD, listed for liver transplant with meld 23 who underwent live transplant on 10/5/18.  Back to OR on 10/6/18 for ex lap.  Admitted 10/1/18 for hyponatremia s/p paracentesis.  Personal has known diagnosis of diabetes, endocrine consulted for diabetes management.    Post-operative course complicated by ESBL Klebsiella pneumoniae in urine (in contact isolation), peritransplant ascites, worsening renal function (required temporary dialysis), anemia (multiple blood transfusions), and possible GI bleed.     Interval HPI:   Overnight events:  Remains in TSU. BG above goal overnight.Did not receive correction scale at noon BG reading due to procedure. Prednisone 10 mg daily; standard steroid taper.   Eatin%  Nausea: No  Hypoglycemia and intervention: No  Fever: No  TPN and/or TF: No    /74   Pulse 94   Temp 97.8 °F (36.6 °C)   Resp 11   Ht 5' 3" (1.6 m)   Wt 63.2 kg (139 lb 5.3 oz)   LMP  (LMP Unknown)   SpO2 95%   Breastfeeding? No   BMI 24.68 kg/m²      Labs Reviewed and Include    Recent Labs   Lab 18  0814   *   CALCIUM 8.7   ALBUMIN 2.7*   PROT 5.1*   NA " 131*   K 3.6   CO2 25   CL 92*   BUN 93*   CREATININE 2.1*   ALKPHOS 123   ALT 19   AST 19   BILITOT 1.3*     Lab Results   Component Value Date    WBC 5.83 11/19/2018    HGB 8.0 (L) 11/19/2018    HCT 25.6 (L) 11/19/2018    MCV 92 11/19/2018     (L) 11/19/2018     No results for input(s): TSH, FREET4 in the last 168 hours.  Lab Results   Component Value Date    HGBA1C 6.0 (H) 10/04/2018       Nutritional status:   Body mass index is 24.68 kg/m².  Lab Results   Component Value Date    ALBUMIN 2.7 (L) 11/19/2018    ALBUMIN 2.5 (L) 11/18/2018    ALBUMIN 2.5 (L) 11/17/2018     Lab Results   Component Value Date    PREALBUMIN 15 (L) 11/03/2018    PREALBUMIN 12 (L) 09/17/2018       Estimated Creatinine Clearance: 22.6 mL/min (A) (based on SCr of 2.1 mg/dL (H)).    Accu-Checks  Recent Labs     11/17/18  2130 11/18/18  0809 11/18/18  1305 11/18/18  1801 11/18/18  2108 11/19/18  0752 11/19/18  1207 11/19/18  1304 11/19/18  1745 11/19/18  2205   POCTGLUCOSE 210* 177* 206* 131* 87 180* 175* 204* 270* 225*       Current Medications and/or Treatments Impacting Glycemic Control  Immunotherapy:    Immunosuppressants         Stop Route Frequency     tacrolimus capsule 0.5 mg      -- Oral 2 times daily     mycophenolate capsule 500 mg      -- Oral 2 times daily        Steroids:   Hormones (From admission, onward)    Start     Stop Route Frequency Ordered    12/01/18 0900  predniSONE tablet 2.5 mg      12/08 0859 Oral Daily 11/05/18 1004    11/24/18 0900  predniSONE tablet 5 mg      12/01 0859 Oral Daily 11/05/18 1004    11/17/18 0900  predniSONE tablet 10 mg      11/24 0859 Oral Daily 11/05/18 1004    10/17/18 0945  predniSONE tablet 15 mg      10/24 0859 Oral Daily 10/17/18 0943        Pressors:    Autonomic Drugs (From admission, onward)    Start     Stop Route Frequency Ordered    10/10/18 1500  midodrine tablet 15 mg      -- Oral 3 times daily 10/10/18 1122    10/06/18 1617  rocuronium (ZEMURON) 10 mg/mL injection      Comments:  Created by cabinet override    10/07 0429   10/06/18 1617        Hyperglycemia/Diabetes Medications:   Antihyperglycemics (From admission, onward)    Start     Stop Route Frequency Ordered    11/19/18 1130  insulin aspart U-100 pen 8 Units      -- SubQ 3 times daily with meals 11/19/18 0834    10/14/18 0925  insulin aspart U-100 pen 0-5 Units      -- SubQ Before meals & nightly PRN 10/14/18 0825    10/07/18 1200  insulin regular (Humulin R) 100 Units in sodium chloride 0.9% 100 mL infusion      10/13 2100 IV Continuous 10/07/18 1055          ASSESSMENT and PLAN    * Liver transplanted    Managed per primary team  Avoid hypoglycemia    Recent Labs   Lab 11/18/18  0624   ALT 18   AST 18   ALKPHOS 115   BILITOT 1.2*   PROT 4.6*   ALBUMIN 2.5*            Type 2 diabetes mellitus with diabetic polyneuropathy, with long-term current use of insulin    BG goal 140-180.     Continue Novolog 8 units with meals.   Low dose correction scale given kidney function.  BG monitoring AC/HS    ADDENDUM: change to novolog 5 units with meals if BG <120 (or call endocrine) or 10 units with meals if >120.     Start self/caregiver administration of insulin to review for home.     Discharge planning:  TBD     EVA (acute kidney injury)    Avoid insulin stacking and hypoglycemia.  Lab Results   Component Value Date    CREATININE 2.2 (H) 11/18/2018        Prophylactic immunotherapy    May increase insulin resistance.        Severe malnutrition    Oral intake encouraged, may affect BG readings         Aviva Caldera NP  Endocrinology  [facility]

## 2018-11-20 NOTE — NURSING
"Pt and pt's spouse refusing to pull self meds.  States, "I don't need that, I already know how to do that."  RN confirmed and pt's spouse verified that they don't want to pull self meds at this time.  RN will pull meds and administer, and continue to monitor.  "

## 2018-11-20 NOTE — ASSESSMENT & PLAN NOTE
"- H&H cont to fluctuate.   - FOBT +.  - EGD 10/18 - showed diffuse bleeding with ulcerated duodenal mucosa.  - EGD 11/5 - improvement seen, no active bleeding.  - 1 unit PRBCs transfused 10/27 and 11/3.   - H&H with good response.   - Resume heparin sq injections 11/6.   - Decrease heparin sq from TID to BID 11/15   - heparin dc'd 11/17  - See "anemia requiring transfusions."  - Continue to monitor.         "

## 2018-11-20 NOTE — ASSESSMENT & PLAN NOTE
- CRRT 10/14, 10/17, 10/20, 10/31.  - CXR 10/30: abundant fluid in b/l pleural space.  - Continue 80 mg Lasix BID.  - Monitor daily weights.

## 2018-11-20 NOTE — PROGRESS NOTES
[facility]  Vascular Surgery  Progress Note    Patient Name: Scarlett Reddy  MRN: [id number]  Admission Date: 10/1/2018  Primary Care Provider: Primary Doctor No    Subjective:     Interval History: NAEON. Access site soft.    Post-Op Info:  Procedure(s) (LRB):  ANGIOGRAM-HEPATIC (Left)   1 Day Post-Op       Medications:  Continuous Infusions:  Scheduled Meds:   aspirin  81 mg Oral Daily    atovaquone  1,500 mg Oral Daily    bisacodyl  10 mg Oral QHS    docusate sodium  100 mg Oral Daily    entecavir  0.5 mg Oral Q72H    furosemide  80 mg Oral BID    insulin aspart U-100  8 Units Subcutaneous TIDWM    magnesium oxide  800 mg Oral BID    midodrine  15 mg Oral TID    mycophenolate  500 mg Oral BID    pantoprazole  40 mg Oral BID    polyethylene glycol  17 g Oral Daily    predniSONE  10 mg Oral Daily    Followed by    [START ON 11/24/2018] predniSONE  5 mg Oral Daily    Followed by    [START ON 12/1/2018] predniSONE  2.5 mg Oral Daily    tacrolimus  0.5 mg Oral BID    ursodiol  300 mg Oral BID     PRN Meds:acetaminophen, bisacodyl, calcium carbonate, dextrose 50%, dextrose 50%, glucagon (human recombinant), glucose, glucose, insulin aspart U-100, ondansetron, ondansetron, polyethylene glycol, sodium chloride 0.9%, traMADol     Objective:     Vital Signs (Most Recent):  Temp: 97.8 °F (36.6 °C) (11/19/18 2000)  Pulse: 100 (11/20/18 0400)  Resp: 12 (11/20/18 0400)  BP: 115/71 (11/20/18 0400)  SpO2: 97 % (11/20/18 0400) Vital Signs (24h Range):  Temp:  [96.4 °F (35.8 °C)-97.9 °F (36.6 °C)] 97.8 °F (36.6 °C)  Pulse:  [] 100  Resp:  [11-20] 12  SpO2:  [95 %-100 %] 97 %  BP: ()/(52-78) 115/71          Physical Exam   Constitutional: She appears well-developed. No distress.   Temporal and distal extremity muscle wasting   HENT:   Head: Normocephalic and atraumatic.   Eyes: EOM are normal. No scleral icterus.   Neck: Normal range of motion.   Cardiovascular: Normal rate and regular  rhythm.   No murmur heard.  Left brachial palpable pulse, soft, no hematoma   Pulmonary/Chest: Effort normal.   Abdominal: Soft. She exhibits distension. There is tenderness. There is no guarding.   Incision with packing in place.    Musculoskeletal: Normal range of motion. She exhibits edema (2+ BLE, + generalized).   Neurological: She is alert.   Skin: Skin is warm.       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

## 2018-11-20 NOTE — ASSESSMENT & PLAN NOTE
- Paracentesis 10/1, 5L removed, no fluid sent for analysis.  - Pt remains volume overloaded.   - Liver US 11/6 with severe ascites.   - Paracentesis 11/7 with 8.9 L removed. Wbc 39, segs 16%. Gram stain - no organisms seen and no WBC. Cultures NGTD.  - Paracentesis 11/12: 8.6 L removed. 34 wbc, 25% segs, 33 lymphs, 42 monocytes/macrophages. Cultures NGTD  - Paracentesis planned for 11/21 to remove 4 L.

## 2018-11-20 NOTE — PROGRESS NOTES
TIMBO made two separate attempts to contact pt's  Dunia via phone (ph# [phone]) today to conduct f/u visit with pt in room; however, TIMBO was unsuccessful at reaching Dunia.  TIMBO will plan to f/u with pt/Dunia tomorrow morning.

## 2018-11-20 NOTE — NURSING TRANSFER
Nursing Transfer Note      11/19/2018     Transfer To: TSU [id number]    Transfer via bed    Transfer with cardiac monitoring    Transported by PACU staff    Medicines sent: n/a    Chart send with patient: Yes    Notified: spouse    Patient reassessed at: 11/19/18 at 1750    Upon arrival to floor: cardiac monitor applied, patient oriented to room, call bell in reach and bed in lowest position, VisiMobile applied.  Pt denies pain or other need at this time; RN will continue to monitor, assess, and alter plan of care as needed until report given to oncoming night shift nurse.

## 2018-11-20 NOTE — PLAN OF CARE
Problem: Physical Therapy Goal  Goal: Physical Therapy Goal  Goals to be met by: 2018    Patient will increase functional independence with mobility by performin. Supine to sit with Contact Guard Assistance - not met  2. Sit to stand transfer with Supervision using LRAD or no AD - not met  3. Bed to chair transfer with Supervision using LRAD or no AD - not met  4. Gait  x 250 feet with Supervision using LRAD or no AD - not met  5. Stand for 3 minutes with Supervision to increase activity tolerance - not met   6. Lower extremity exercise program x15 reps per handout, with independence - not met          Outcome: Ongoing (interventions implemented as appropriate)  Goals reviewed and remain appropriate. Pt progressing towards goals.    Radha Nguyen, PT, DPT   2018  [phone]

## 2018-11-20 NOTE — ASSESSMENT & PLAN NOTE
67yo F with OLTx 10/4/18 with worsening hepatic artery stenosis    - S/p hepatic angio; occluded hepatic artery unable to be crossed during angio  - Access site without hematoma  - Care per primary team

## 2018-11-20 NOTE — ASSESSMENT & PLAN NOTE
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     Plan:  - No indication for RRT  - decrease  ml documented   - stable sCr 2.0 mg/dL  - Hepatic angiogram done 24 ml of hypo-osmotic contrast  - No uremic symptoms or signs.   - Patient's pitting LE edema slowly improving and evidence of fluid overload, but OS requirements as stable  -  Will cont to trend Renal fx and electrolytes   - Continue furosemide at 80 mg PO BID dose.  - Accutate I/O's and daily standing am weights

## 2018-11-20 NOTE — ASSESSMENT & PLAN NOTE
- Liver US 11/19 showed no arterial flow to R and sluggish flow to L  - Hepatic angiogram 11/19 confirmed HAT  - No further intervention planned at this time. Will continue to monitor.

## 2018-11-20 NOTE — CARE UPDATE
Attempted to see patient x2.  Hepatic angiogram today. BG goal 140-180.       Continue novolog 8 units with meals. BG monitoring ac/hs and low dose correction scale.     ** Please call Endocrine for any BG related issues **

## 2018-11-21 LAB
ALBUMIN SERPL BCP-MCNC: 3.4 G/DL
ALP SERPL-CCNC: 76 U/L
ALT SERPL W/O P-5'-P-CCNC: 5 U/L
ANION GAP SERPL CALC-SCNC: 14 MMOL/L
APPEARANCE FLD: NORMAL
AST SERPL-CCNC: 12 U/L
BASOPHILS # BLD AUTO: 0.02 K/UL
BASOPHILS NFR BLD: 0.4 %
BILIRUB SERPL-MCNC: 1.1 MG/DL
BODY FLD TYPE: NORMAL
BUN SERPL-MCNC: 75 MG/DL
CALCIUM SERPL-MCNC: 8.6 MG/DL
CHLORIDE SERPL-SCNC: 92 MMOL/L
CO2 SERPL-SCNC: 27 MMOL/L
COLOR FLD: YELLOW
CREAT SERPL-MCNC: 1.8 MG/DL
DIFFERENTIAL METHOD: ABNORMAL
EOSINOPHIL # BLD AUTO: 0 K/UL
EOSINOPHIL NFR BLD: 0.4 %
ERYTHROCYTE [DISTWIDTH] IN BLOOD BY AUTOMATED COUNT: 17.1 %
EST. GFR  (AFRICAN AMERICAN): 32.6 ML/MIN/1.73 M^2
EST. GFR  (NON AFRICAN AMERICAN): 28.3 ML/MIN/1.73 M^2
GLUCOSE SERPL-MCNC: 171 MG/DL
HCT VFR BLD AUTO: 22.5 %
HGB BLD-MCNC: 7 G/DL
IMM GRANULOCYTES # BLD AUTO: 0.08 K/UL
IMM GRANULOCYTES NFR BLD AUTO: 1.7 %
LYMPHOCYTES # BLD AUTO: 0.3 K/UL
LYMPHOCYTES NFR BLD: 5.6 %
LYMPHOCYTES NFR FLD MANUAL: 33 %
MAGNESIUM SERPL-MCNC: 2.3 MG/DL
MCH RBC QN AUTO: 29.3 PG
MCHC RBC AUTO-ENTMCNC: 31.1 G/DL
MCV RBC AUTO: 94 FL
MONOCYTES # BLD AUTO: 0.3 K/UL
MONOCYTES NFR BLD: 5.4 %
MONOS+MACROS NFR FLD MANUAL: 28 %
NEUTROPHILS # BLD AUTO: 4.2 K/UL
NEUTROPHILS NFR BLD: 86.5 %
NEUTROPHILS NFR FLD MANUAL: 39 %
NRBC BLD-RTO: 0 /100 WBC
PHOSPHATE SERPL-MCNC: 2 MG/DL
PLATELET # BLD AUTO: 134 K/UL
PMV BLD AUTO: 9.9 FL
POCT GLUCOSE: 161 MG/DL (ref 70–110)
POCT GLUCOSE: 193 MG/DL (ref 70–110)
POCT GLUCOSE: 213 MG/DL (ref 70–110)
POCT GLUCOSE: 245 MG/DL (ref 70–110)
POTASSIUM SERPL-SCNC: 3.2 MMOL/L
PROT SERPL-MCNC: 4.9 G/DL
RBC # BLD AUTO: 2.39 M/UL
SODIUM SERPL-SCNC: 133 MMOL/L
TACROLIMUS BLD-MCNC: 2 NG/ML
WBC # BLD AUTO: 4.81 K/UL
WBC # FLD: 113 /CU MM

## 2018-11-21 PROCEDURE — 36430 TRANSFUSION BLD/BLD COMPNT: CPT

## 2018-11-21 PROCEDURE — 80197 ASSAY OF TACROLIMUS: CPT

## 2018-11-21 PROCEDURE — 94664 DEMO&/EVAL PT USE INHALER: CPT

## 2018-11-21 PROCEDURE — 99900035 HC TECH TIME PER 15 MIN (STAT)

## 2018-11-21 PROCEDURE — 63600175 PHARM REV CODE 636 W HCPCS: Performed by: NURSE PRACTITIONER

## 2018-11-21 PROCEDURE — 94761 N-INVAS EAR/PLS OXIMETRY MLT: CPT

## 2018-11-21 PROCEDURE — 36415 COLL VENOUS BLD VENIPUNCTURE: CPT

## 2018-11-21 PROCEDURE — 87102 FUNGUS ISOLATION CULTURE: CPT

## 2018-11-21 PROCEDURE — P9047 ALBUMIN (HUMAN), 25%, 50ML: HCPCS | Mod: JG | Performed by: PHYSICIAN ASSISTANT

## 2018-11-21 PROCEDURE — 97530 THERAPEUTIC ACTIVITIES: CPT

## 2018-11-21 PROCEDURE — 25000003 PHARM REV CODE 250: Performed by: NURSE PRACTITIONER

## 2018-11-21 PROCEDURE — 0W9G3ZZ DRAINAGE OF PERITONEAL CAVITY, PERCUTANEOUS APPROACH: ICD-10-PCS | Performed by: NURSE PRACTITIONER

## 2018-11-21 PROCEDURE — 25000003 PHARM REV CODE 250: Performed by: PHYSICIAN ASSISTANT

## 2018-11-21 PROCEDURE — 83735 ASSAY OF MAGNESIUM: CPT

## 2018-11-21 PROCEDURE — 99233 SBSQ HOSP IP/OBS HIGH 50: CPT | Mod: 24,,, | Performed by: PHYSICIAN ASSISTANT

## 2018-11-21 PROCEDURE — 89051 BODY FLUID CELL COUNT: CPT

## 2018-11-21 PROCEDURE — P9021 RED BLOOD CELLS UNIT: HCPCS

## 2018-11-21 PROCEDURE — 87070 CULTURE OTHR SPECIMN AEROBIC: CPT

## 2018-11-21 PROCEDURE — 80053 COMPREHEN METABOLIC PANEL: CPT

## 2018-11-21 PROCEDURE — 87075 CULTR BACTERIA EXCEPT BLOOD: CPT

## 2018-11-21 PROCEDURE — 20600001 HC STEP DOWN PRIVATE ROOM

## 2018-11-21 PROCEDURE — 99231 SBSQ HOSP IP/OBS SF/LOW 25: CPT | Mod: ,,, | Performed by: NURSE PRACTITIONER

## 2018-11-21 PROCEDURE — 63600175 PHARM REV CODE 636 W HCPCS: Mod: JG | Performed by: PHYSICIAN ASSISTANT

## 2018-11-21 PROCEDURE — 86902 BLOOD TYPE ANTIGEN DONOR EA: CPT

## 2018-11-21 PROCEDURE — 25000003 PHARM REV CODE 250: Performed by: TRANSPLANT SURGERY

## 2018-11-21 PROCEDURE — 84100 ASSAY OF PHOSPHORUS: CPT

## 2018-11-21 PROCEDURE — 27000646 HC AEROBIKA DEVICE

## 2018-11-21 PROCEDURE — 87205 SMEAR GRAM STAIN: CPT

## 2018-11-21 PROCEDURE — 63600175 PHARM REV CODE 636 W HCPCS: Mod: JG | Performed by: TRANSPLANT SURGERY

## 2018-11-21 PROCEDURE — 85025 COMPLETE CBC W/AUTO DIFF WBC: CPT

## 2018-11-21 PROCEDURE — P9047 ALBUMIN (HUMAN), 25%, 50ML: HCPCS | Mod: JG | Performed by: TRANSPLANT SURGERY

## 2018-11-21 RX ORDER — HYDROCODONE BITARTRATE AND ACETAMINOPHEN 500; 5 MG/1; MG/1
TABLET ORAL
Status: DISCONTINUED | OUTPATIENT
Start: 2018-11-21 | End: 2018-11-23

## 2018-11-21 RX ORDER — ENTECAVIR 0.5 MG/1
0.5 TABLET, FILM COATED ORAL EVERY OTHER DAY
Status: DISCONTINUED | OUTPATIENT
Start: 2018-11-22 | End: 2018-11-26

## 2018-11-21 RX ORDER — ALBUMIN HUMAN 250 G/1000ML
25 SOLUTION INTRAVENOUS ONCE
Status: COMPLETED | OUTPATIENT
Start: 2018-11-21 | End: 2018-11-21

## 2018-11-21 RX ORDER — SODIUM,POTASSIUM PHOSPHATES 280-250MG
2 POWDER IN PACKET (EA) ORAL 2 TIMES DAILY
Status: COMPLETED | OUTPATIENT
Start: 2018-11-21 | End: 2018-11-22

## 2018-11-21 RX ORDER — HEPARIN SODIUM 5000 [USP'U]/ML
5000 INJECTION, SOLUTION INTRAVENOUS; SUBCUTANEOUS EVERY 8 HOURS
Status: CANCELLED | OUTPATIENT
Start: 2018-11-21

## 2018-11-21 RX ADMIN — ALBUMIN (HUMAN) 25 G: 25 SOLUTION INTRAVENOUS at 03:11

## 2018-11-21 RX ADMIN — DOCUSATE SODIUM 100 MG: 100 CAPSULE, LIQUID FILLED ORAL at 08:11

## 2018-11-21 RX ADMIN — DIBASIC SODIUM PHOSPHATE, MONOBASIC POTASSIUM PHOSPHATE AND MONOBASIC SODIUM PHOSPHATE 2 TABLET: 852; 155; 130 TABLET ORAL at 10:11

## 2018-11-21 RX ADMIN — ATOVAQUONE 1500 MG: 750 SUSPENSION ORAL at 08:11

## 2018-11-21 RX ADMIN — URSODIOL 300 MG: 300 CAPSULE ORAL at 08:11

## 2018-11-21 RX ADMIN — MYCOPHENOLATE MOFETIL 500 MG: 250 CAPSULE ORAL at 08:11

## 2018-11-21 RX ADMIN — MAGNESIUM OXIDE TAB 400 MG (241.3 MG ELEMENTAL MG) 800 MG: 400 (241.3 MG) TAB at 08:11

## 2018-11-21 RX ADMIN — INSULIN ASPART 2 UNITS: 100 INJECTION, SOLUTION INTRAVENOUS; SUBCUTANEOUS at 02:11

## 2018-11-21 RX ADMIN — FUROSEMIDE 80 MG: 40 TABLET ORAL at 05:11

## 2018-11-21 RX ADMIN — INSULIN ASPART 10 UNITS: 100 INJECTION, SOLUTION INTRAVENOUS; SUBCUTANEOUS at 02:11

## 2018-11-21 RX ADMIN — FUROSEMIDE 80 MG: 40 TABLET ORAL at 08:11

## 2018-11-21 RX ADMIN — TACROLIMUS 0.5 MG: 0.5 CAPSULE ORAL at 08:11

## 2018-11-21 RX ADMIN — POTASSIUM BICARBONATE 50 MEQ: 25 TABLET, EFFERVESCENT ORAL at 09:11

## 2018-11-21 RX ADMIN — MIDODRINE HYDROCHLORIDE 15 MG: 5 TABLET ORAL at 08:11

## 2018-11-21 RX ADMIN — INSULIN ASPART 10 UNITS: 100 INJECTION, SOLUTION INTRAVENOUS; SUBCUTANEOUS at 08:11

## 2018-11-21 RX ADMIN — MIDODRINE HYDROCHLORIDE 15 MG: 5 TABLET ORAL at 02:11

## 2018-11-21 RX ADMIN — POLYETHYLENE GLYCOL 3350 17 G: 17 POWDER, FOR SOLUTION ORAL at 08:11

## 2018-11-21 RX ADMIN — INSULIN ASPART 1 UNITS: 100 INJECTION, SOLUTION INTRAVENOUS; SUBCUTANEOUS at 10:11

## 2018-11-21 RX ADMIN — ALBUMIN HUMAN 25 G: 0.25 SOLUTION INTRAVENOUS at 04:11

## 2018-11-21 RX ADMIN — TACROLIMUS 0.5 MG: 0.5 CAPSULE ORAL at 05:11

## 2018-11-21 RX ADMIN — BISACODYL 10 MG: 5 TABLET, COATED ORAL at 08:11

## 2018-11-21 RX ADMIN — PANTOPRAZOLE SODIUM 40 MG: 40 TABLET, DELAYED RELEASE ORAL at 08:11

## 2018-11-21 RX ADMIN — PREDNISONE 10 MG: 10 TABLET ORAL at 08:11

## 2018-11-21 RX ADMIN — POTASSIUM & SODIUM PHOSPHATES POWDER PACK 280-160-250 MG 2 PACKET: 280-160-250 PACK at 04:11

## 2018-11-21 RX ADMIN — INSULIN ASPART 10 UNITS: 100 INJECTION, SOLUTION INTRAVENOUS; SUBCUTANEOUS at 05:11

## 2018-11-21 RX ADMIN — ASPIRIN 81 MG CHEWABLE TABLET 81 MG: 81 TABLET CHEWABLE at 08:11

## 2018-11-21 NOTE — ASSESSMENT & PLAN NOTE
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     Plan:  - No indication for RRT  - Improved UOP to 1700 ml documented   - Improved sCr 1.7 mg/dL suggest renal recovery  - Hepatic angiogram done 24 ml of hypo-osmotic contrast 11/19/2018  - No uremic symptoms or signs.   - Patient's pitting LE edema slowly improving and evidence of fluid overload, but OS requirements as stable  -  Will cont to trend Renal fx and electrolytes   - Continue furosemide at 80 mg PO BID dose, edema improving  - Accutate I/O's and daily standing am weights  - Will monitor labs from a distance tomorrow but please feel free to call with questions

## 2018-11-21 NOTE — ASSESSMENT & PLAN NOTE
- Fluctuating H&H.    - Transfusions: 10/14; 10/16; 10/18; 10/19; 10/22; 10/27; 10/29, 10/31, 11/3, 11/21  - FOBT +  - LDH elevated, Hapto < 10.  - EGD 10/18: ulcerated duodenal mucosa.   - Consulted GI about this ongoing issue and further need of another EGD.  - EGD 11/5 unremarkable.   - Iron studies 11/13:  95, TIBC 107, sat iron 89, transferrin 72, ferritin pending  - Hemolytic anemia workup 11/13: Haptoglobin 132, retic 3.9   - H/H decreased to 7/22.5 11/21. Will transfuse 1upRBC  - Will continue to monitor closely.

## 2018-11-21 NOTE — PLAN OF CARE
Problem: Physical Therapy Goal  Goal: Physical Therapy Goal  Goals to be met by: 2018    Patient will increase functional independence with mobility by performin. Supine to sit with Contact Guard Assistance - not met  2. Sit to stand transfer with Supervision using LRAD or no AD - not met  3. Bed to chair transfer with Supervision using LRAD or no AD - not met  4. Gait  x 250 feet with Supervision using LRAD or no AD - not met  5. Stand for 3 minutes with Supervision to increase activity tolerance - not met   6. Lower extremity exercise program x15 reps per handout, with independence - not met          Outcome: Ongoing (interventions implemented as appropriate)  Goals reviewed and remain appropriate. Pt progressing towards goals.    Radha Nguyen, PT, DPT   2018  [phone]

## 2018-11-21 NOTE — SUBJECTIVE & OBJECTIVE
"Interval HPI:   Overnight events: remains in TSU. PRBCs infusing. Paracentesis today. BG at or slightly above goal. Prednisone 10 mg daily; standard steroid taper.   Eatin% - 100%  Nausea: No  Hypoglycemia and intervention: No  Fever: No  TPN and/or TF: No    BP (!) 96/58   Pulse 103   Temp 97.8 °F (36.6 °C) (Oral)   Resp 18   Ht 5' 3" (1.6 m)   Wt 63.2 kg (139 lb 5.3 oz)   LMP  (LMP Unknown)   SpO2 97%   Breastfeeding? No   BMI 24.68 kg/m²     Labs Reviewed and Include    Recent Labs   Lab 18  0639   *   CALCIUM 8.6*   ALBUMIN 3.4*   PROT 4.9*   *   K 3.2*   CO2 27   CL 92*   BUN 75*   CREATININE 1.8*   ALKPHOS 76   ALT 5*   AST 12   BILITOT 1.1*     Lab Results   Component Value Date    WBC 4.81 2018    HGB 7.0 (L) 2018    HCT 22.5 (L) 2018    MCV 94 2018     (L) 2018     No results for input(s): TSH, FREET4 in the last 168 hours.  Lab Results   Component Value Date    HGBA1C 6.0 (H) 10/04/2018       Nutritional status:   Body mass index is 24.68 kg/m².  Lab Results   Component Value Date    ALBUMIN 3.4 (L) 2018    ALBUMIN 2.5 (L) 2018    ALBUMIN 2.7 (L) 2018     Lab Results   Component Value Date    PREALBUMIN 15 (L) 2018    PREALBUMIN 12 (L) 2018       Estimated Creatinine Clearance: 26.4 mL/min (A) (based on SCr of 1.8 mg/dL (H)).    Accu-Checks  Recent Labs     18  0752 18  1207 18  1304 18  1745 18  2205 18  0823 18  1223 18  1746 18  2053 18  0741   POCTGLUCOSE 180* 175* 204* 270* 225* 247* 225* 212* 155* 193*       Current Medications and/or Treatments Impacting Glycemic Control  Immunotherapy:    Immunosuppressants         Stop Route Frequency     tacrolimus capsule 0.5 mg      -- Oral 2 times daily     mycophenolate capsule 500 mg      -- Oral 2 times daily        Steroids:   Hormones (From admission, onward)    Start     Stop Route Frequency " Ordered    12/01/18 0900  predniSONE tablet 2.5 mg      12/08 0859 Oral Daily 11/05/18 1004    11/24/18 0900  predniSONE tablet 5 mg      12/01 0859 Oral Daily 11/05/18 1004    11/17/18 0900  predniSONE tablet 10 mg      11/24 0859 Oral Daily 11/05/18 1004    10/17/18 0945  predniSONE tablet 15 mg      10/24 0859 Oral Daily 10/17/18 0943        Pressors:    Autonomic Drugs (From admission, onward)    Start     Stop Route Frequency Ordered    10/10/18 1500  midodrine tablet 15 mg      -- Oral 3 times daily 10/10/18 1122    10/06/18 1617  rocuronium (ZEMURON) 10 mg/mL injection     Comments:  Created by cabinet overrholley    10/07 0429   10/06/18 1617        Hyperglycemia/Diabetes Medications:   Antihyperglycemics (From admission, onward)    Start     Stop Route Frequency Ordered    11/20/18 1645  insulin aspart U-100 pen 5-10 Units      -- SubQ 3 times daily with meals 11/20/18 1541    10/14/18 0925  insulin aspart U-100 pen 0-5 Units      -- SubQ Before meals & nightly PRN 10/14/18 0825    10/07/18 1200  insulin regular (Humulin R) 100 Units in sodium chloride 0.9% 100 mL infusion      10/13 2100 IV Continuous 10/07/18 1055

## 2018-11-21 NOTE — SUBJECTIVE & OBJECTIVE
Interval History:   No acute events overnight, feeling well this am. UOP much better overnight 1700 ml UOP overnight fluid balance not accurate. Had hepatic angiogram yesterday with complet occlusion. Renal function keeps improving, sCr improved to 1.7 mg/dL.     Review of patient's allergies indicates:  No Known Allergies  Current Facility-Administered Medications   Medication Frequency    0.9%  NaCl infusion (for blood administration) Q24H PRN    acetaminophen tablet 650 mg Q6H PRN    aspirin chewable tablet 81 mg Daily    atovaquone suspension 1,500 mg Daily    bisacodyl EC tablet 10 mg QHS    bisacodyl suppository 10 mg Daily PRN    calcium carbonate 200 mg calcium (500 mg) chewable tablet 500 mg TID PRN    dextrose 50% injection 12.5 g PRN    dextrose 50% injection 25 g PRN    docusate sodium capsule 100 mg Daily    entecavir tablet 0.5 mg Q72H    furosemide tablet 80 mg BID    glucagon (human recombinant) injection 1 mg PRN    glucose chewable tablet 16 g PRN    glucose chewable tablet 24 g PRN    insulin aspart U-100 pen 0-5 Units QID (AC + HS) PRN    insulin aspart U-100 pen 5-10 Units TIDWM    k phos di & mono-sod phos mono 250 mg tablet 2 tablet BID    magnesium oxide tablet 800 mg BID    midodrine tablet 15 mg TID    mycophenolate capsule 500 mg BID    ondansetron disintegrating tablet 8 mg Q8H PRN    ondansetron injection 4 mg Q8H PRN    pantoprazole EC tablet 40 mg BID    polyethylene glycol packet 17 g Daily PRN    polyethylene glycol packet 17 g Daily    predniSONE tablet 10 mg Daily    Followed by    [START ON 11/24/2018] predniSONE tablet 5 mg Daily    Followed by    [START ON 12/1/2018] predniSONE tablet 2.5 mg Daily    sodium chloride 0.9% flush 3 mL PRN    tacrolimus capsule 0.5 mg BID    traMADol tablet 50 mg Q6H PRN    ursodiol capsule 300 mg BID       Objective:     Vital Signs (Most Recent):  Temp: 97.8 °F (36.6 °C) (11/21/18 0740)  Pulse: 103 (11/21/18  0830)  Resp: 18 (11/21/18 0830)  BP: (!) 96/58 (11/21/18 0800)  SpO2: 97 % (11/21/18 0830)  O2 Device (Oxygen Therapy): room air (11/21/18 0415) Vital Signs (24h Range):  Temp:  [97.8 °F (36.6 °C)-98.2 °F (36.8 °C)] 97.8 °F (36.6 °C)  Pulse:  [] 103  Resp:  [14-18] 18  SpO2:  [95 %-98 %] 97 %  BP: ()/(58-71) 96/58     Weight: 63.2 kg (139 lb 5.3 oz) (11/19/18 0647)  Body mass index is 24.68 kg/m².  Body surface area is 1.68 meters squared.    I/O last 3 completed shifts:  In: 940 [P.O.:920; I.V.:20]  Out: 1700 [Urine:1700]    Physical Exam   Constitutional: She is oriented to person, place, and time. She appears well-developed. No distress.   Temporal and distal extremity muscle wasting   HENT:   Head: Normocephalic and atraumatic.   Eyes: EOM are normal. No scleral icterus.   Neck: Normal range of motion.   Cardiovascular: Normal rate and regular rhythm.   No murmur heard.  Pulmonary/Chest: Effort normal. No respiratory distress. She has no wheezes.   Abdominal: Bowel sounds are normal. She exhibits distension and ascites. There is tenderness. There is no guarding.   Chevron incision w/ steri strips, minimal serous leakage from R side   RLQ VALORIE drain sites c/d/i with sutures   Musculoskeletal: Normal range of motion. She exhibits edema (1+ BLE, + generalized improving).   Neurological: She is alert and oriented to person, place, and time.   Skin: Skin is warm. She is not diaphoretic.   Psychiatric: She has a normal mood and affect.   Nursing note and vitals reviewed.    Interval History:   No acute events overnight. UOP 1050 ml UOP overnight fluid balance -450 ml yesterday. sCr very slowly improving. Heptic angiogram was h elp tou Doppler US of liver today. She feels well.     Review of patient's allergies indicates:  No Known Allergies  Current Facility-Administered Medications   Medication Frequency    0.9%  NaCl infusion (for blood administration) Q24H PRN    acetaminophen tablet 650 mg Q6H PRN     aspirin chewable tablet 81 mg Daily    atovaquone suspension 1,500 mg Daily    bisacodyl EC tablet 10 mg QHS    bisacodyl suppository 10 mg Daily PRN    calcium carbonate 200 mg calcium (500 mg) chewable tablet 500 mg TID PRN    dextrose 50% injection 12.5 g PRN    dextrose 50% injection 25 g PRN    docusate sodium capsule 100 mg Daily    entecavir tablet 0.5 mg Q72H    furosemide tablet 80 mg BID    glucagon (human recombinant) injection 1 mg PRN    glucose chewable tablet 16 g PRN    glucose chewable tablet 24 g PRN    insulin aspart U-100 pen 0-5 Units QID (AC + HS) PRN    insulin aspart U-100 pen 5-10 Units TIDWM    k phos di & mono-sod phos mono 250 mg tablet 2 tablet BID    magnesium oxide tablet 800 mg BID    midodrine tablet 15 mg TID    mycophenolate capsule 500 mg BID    ondansetron disintegrating tablet 8 mg Q8H PRN    ondansetron injection 4 mg Q8H PRN    pantoprazole EC tablet 40 mg BID    polyethylene glycol packet 17 g Daily PRN    polyethylene glycol packet 17 g Daily    predniSONE tablet 10 mg Daily    Followed by    [START ON 11/24/2018] predniSONE tablet 5 mg Daily    Followed by    [START ON 12/1/2018] predniSONE tablet 2.5 mg Daily    sodium chloride 0.9% flush 3 mL PRN    tacrolimus capsule 0.5 mg BID    traMADol tablet 50 mg Q6H PRN    ursodiol capsule 300 mg BID       Objective:     Vital Signs (Most Recent):  Temp: 97.8 °F (36.6 °C) (11/21/18 0740)  Pulse: 103 (11/21/18 0830)  Resp: 18 (11/21/18 0830)  BP: (!) 96/58 (11/21/18 0800)  SpO2: 97 % (11/21/18 0830)  O2 Device (Oxygen Therapy): room air (11/21/18 0415) Vital Signs (24h Range):  Temp:  [97.8 °F (36.6 °C)-98.2 °F (36.8 °C)] 97.8 °F (36.6 °C)  Pulse:  [] 103  Resp:  [14-18] 18  SpO2:  [95 %-98 %] 97 %  BP: ()/(58-71) 96/58     Weight: 63.2 kg (139 lb 5.3 oz) (11/19/18 8403)  Body mass index is 24.68 kg/m².  Body surface area is 1.68 meters squared.    I/O last 3 completed shifts:  In: 940  [P.O.:920; I.V.:20]  Out: 1700 [Urine:1700]    Physical Exam   Constitutional: She is oriented to person, place, and time. She appears well-developed. No distress.   Temporal and distal extremity muscle wasting   HENT:   Head: Normocephalic and atraumatic.   Eyes: EOM are normal. No scleral icterus.   Neck: Normal range of motion.   Cardiovascular: Normal rate and regular rhythm.   No murmur heard.  Pulmonary/Chest: Effort normal. No respiratory distress. She has no wheezes.   Abdominal: Bowel sounds are normal. She exhibits distension has some ascitis. There is tenderness. There is no guarding.   Chevron incision w/ steri strips, minimal serous leakage from R side   RLQ VALORIE drain sites c/d/i with sutures   Musculoskeletal: Normal range of motion. She exhibits edema (1+ BLE, + generalized).   Neurological: She is alert and oriented to person, place, and time.   Skin: Skin is warm. She is not diaphoretic.   Psychiatric: She has a normal mood and affect.   Nursing note and vitals reviewed.      Significant Labs:  CBC:   Recent Labs   Lab 11/21/18  0832   WBC 4.81   RBC 2.39*   HGB 7.0*   HCT 22.5*   *   MCV 94   MCH 29.3   MCHC 31.1*     CMP:   Recent Labs   Lab 11/21/18  0639   *   CALCIUM 8.6*   ALBUMIN 3.4*   PROT 4.9*   *   K 3.2*   CO2 27   CL 92*   BUN 75*   CREATININE 1.8*   ALKPHOS 76   ALT 5*   AST 12   BILITOT 1.1*     All labs within the past 24 hours have been reviewed.     Significant Imaging:  US Liver Transplant Impression       Abnormally decreased main hepatic artery peak systolic velocity with slow arterial flow and abnormal tardus parvus waveforms intrahepatically.  Findings concerning for developing hepatic artery stenosis/rejection.  Recommend continued close follow-up with consideration of possible contrast enhanced triple-phase abdominal CT for further evaluation if clinically indicated.    Peritransplant fluid collection as above.    Grossly stable ascites.    Right-sided  pleural effusion.    This report was flagged in Epic as abnormal.    Electronically signed by resident: Ciro Love  Date: 11/15/2018  Time: 10:19    Electronically signed by: Yvan Watkins MD  Date: 11/15/2018  Time: 10:29         Significant Labs:  CBC:   Recent Labs   Lab 11/21/18  0832   WBC 4.81   RBC 2.39*   HGB 7.0*   HCT 22.5*   *   MCV 94   MCH 29.3   MCHC 31.1*     CMP:   Recent Labs   Lab 11/21/18  0639   *   CALCIUM 8.6*   ALBUMIN 3.4*   PROT 4.9*   *   K 3.2*   CO2 27   CL 92*   BUN 75*   CREATININE 1.8*   ALKPHOS 76   ALT 5*   AST 12   BILITOT 1.1*     All labs within the past 24 hours have been reviewed.     Significant Imaging:  US Liver Transplant Impression       Abnormally decreased main hepatic artery peak systolic velocity with slow arterial flow and abnormal tardus parvus waveforms intrahepatically.  Findings concerning for developing hepatic artery stenosis/rejection.  Recommend continued close follow-up with consideration of possible contrast enhanced triple-phase abdominal CT for further evaluation if clinically indicated.    Peritransplant fluid collection as above.    Grossly stable ascites.    Right-sided pleural effusion.    This report was flagged in Epic as abnormal.    Electronically signed by resident: Ciro Love  Date: 11/15/2018  Time: 10:19    Electronically signed by: Yvan Watkins MD  Date: 11/15/2018  Time: 10:29

## 2018-11-21 NOTE — ASSESSMENT & PLAN NOTE
- Stopped Cellcept, Bactrim, and Valcyte 10/31 2/2 leukopenia  - Started Atovaquone 11/6.   - CMV PCR noted to be positive at 129. Will hold off on treatment with valcyte at this time.  - Continue to monitor.

## 2018-11-21 NOTE — PROGRESS NOTES
" [facility]  Adult Nutrition  Progress Note     SUMMARY       Recommendations  Recommendation/Intervention:   1. Continue diet order as tolerated.   2. Encouraged good po intake as tolerated.   3. Dietitian Following    Goals:   1. Patient to meet >85% EEN/EPN.   2. Promote nutrition related labs WNL  Nutrition Goal Status: progressing towards goal  Communication of RD Recs: (POC)    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, DM  Interdisciplinary Rounds: attended  General Information Comments: Patient meeting >50% of EEN/EPN. Paracentesis planned for today. Noted wt down 11.46% x 1 month, likely related to fluid. Weight up 18lbs since NFPE 10/3/18. Now standing weights only.  Nutrition Discharge Planning: Adequate po intake    Nutrition Risk Screen  Nutrition Risk Screen: cultural or Jew food preferences    Nutrition/Diet History  Patient Reported Diet/Restrictions/Preferences: diabetic diet, low salt(Cultural preferences)  Food Preferences: Cultural preferences Noted  Do you have any cultural, spiritual, Jew conflicts, given your current situation?: none noted   Supplemental Drinks or Food Habits: Boost Plus, Ensure Plus(Strawberry)  Food Allergies: NKFA  Factors Affecting Nutritional Intake: abdominal distention    Anthropometrics  Temp: 97.8 °F (36.6 °C)  Height Method: Stated  Height: 5' 3" (160 cm)  Height (inches): 63 in  Weight Method: Standard Scale  Weight: 63.2 kg (139 lb 5.3 oz)  Weight (lb): 139.33 lb  Ideal Body Weight (IBW), Female: 115 lb  % Ideal Body Weight, Female (lb): 101.8 lb  BMI (Calculated): 20.8  BMI Grade: 25 - 29.9 - overweight     Lab/Procedures/Meds  Labs: Reviewed    (L) 11/21/2018    K 3.2 (L) 11/21/2018    BUN 75 (H) 11/21/2018    CREATININE 1.8 (H) 11/21/2018    CALCIUM 8.6 (L) 11/21/2018 "    PHOS 2.0 (L) 11/21/2018    MG 2.3 11/21/2018    EGFRNONAA 28.3 (A) 11/21/2018    ALBUMIN 3.4 (L) 11/21/2018      ALT 5 (L) 11/21/2018    AST 12 11/21/2018    ALKPHOS 76 11/21/2018     Meds: Reviewed  Scheduled Meds:   aspirin  81 mg Oral Daily    atovaquone  1,500 mg Oral Daily    bisacodyl  10 mg Oral QHS    docusate sodium  100 mg Oral Daily    entecavir  0.5 mg Oral Q72H    furosemide  80 mg Oral BID    insulin aspart U-100  5-10 Units Subcutaneous TIDWM    k phos di & mono-sod phos mono  500 mg Oral BID    magnesium oxide  800 mg Oral BID    midodrine  15 mg Oral TID    mycophenolate  500 mg Oral BID    pantoprazole  40 mg Oral BID    polyethylene glycol  17 g Oral Daily    predniSONE  10 mg Oral Daily     Physical Findings/Assessment  Overall Physical Appearance: loss of subcutaneous fat, loss of muscle mass  Oral/Mouth Cavity: tooth/teeth missing  Skin: edema, intact, skin tear(Skin Tear Lower Abdomen)    Estimated/Assessed Needs  Weight Used For Calorie Calculations: 63.2 kg (139 lb 5.3 oz)  Energy Calorie Requirements (kcal): 1896(30 kcal/kg)  Energy Need Method: Kcal/kg  Protein Requirements: 76-88(1.2-1.4 gm/kg)  Weight Used For Protein Calculations: 63.2 kg (139 lb 5.3 oz)  Fluid Requirements (mL): 1 mL/kcal or per MD  Fluid Need Method: RDA Method  RDA Method (mL): 1896     Nutrition Prescription Ordered  Current Diet Order: Regular   Nutrition Order Comments: Fluid - 1500mL    Evaluation of Received Nutrient/Fluid Intake in last 24h  I/O: -12.6L since 11/7/18  Comments: LBM 11/19/2018  % Intake of Estimated Energy Needs: 50 - 75 %  % Meal Intake: 25 - 50 %    Nutrition Risk  Level of Risk/Frequency of Follow-up: low(1x week)     Assessment and Plan  NFPE completed 10/3/18  Severe malnutrition   Nutrition Problem  Severe Malnutrition in the context of Chronic Illness/Injury     Related to (etiology):  ESLD     Signs and Symptoms (as evidenced by):  Energy Intake: <75% of estimated energy  requirement for 2 months  Body Fat Depletion: Severe depletion of orbitals and triceps   Muscle Mass Depletion: Severe depletion of temples, clavicle region and lower extremities   Weight Loss: 12.31% x 1 month     Nutrition Diagnosis Status:  Improving      Monitor and Evaluation  Food and Nutrient Intake: energy intake, food and beverage intake  Food and Nutrient Adminstration: diet order  Knowledge/Beliefs/Attitudes: food and nutrition knowledge/skill  Physical Activity and Function: nutrition-related ADLs and IADLs  Anthropometric Measurements: weight change, weight  Biochemical Data, Medical Tests and Procedures: electrolyte and renal panel, gastrointestinal profile, glucose/endocrine profile, inflammatory profile, lipid profile  Nutrition-Focused Physical Findings: overall appearance     Nutrition Follow-Up  RD Follow-up?: Yes

## 2018-11-21 NOTE — PT/OT/SLP PROGRESS
Occupational Therapy      Patient Name:  Scarlett Reddy   MRN:  [id number]    Patient not seen today secondary to Unavailable (attempted 3x to see pt this date). First attempt, pt eating breakfast, 2nd attempt team in room, 3rd attempt eating lunch and going off floor for test. Will follow-up as scheduled.    Laura corado OT  11/21/2018

## 2018-11-21 NOTE — PLAN OF CARE
Problem: Patient Care Overview  Goal: Plan of Care Review  Outcome: Ongoing (interventions implemented as appropriate)  AAOX4.  VSS.  No complaints of pain.  PIV is intact and free of infection.  Paracentesis planned for this morning.  Spouse is at the bedside.  Bed is in lowest position, call bell is in reach, and pt instructed to call nurse when needing assistance.  Will continue to monitor.

## 2018-11-21 NOTE — PROGRESS NOTES
Paracentesis complete at this time. Patient tolerated well 4L removed from left abdomen.  200cc of albumin given IV per MD protocol.  Report called to VALENTINA Astorga.

## 2018-11-21 NOTE — ASSESSMENT & PLAN NOTE
Avoid insulin stacking and hypoglycemia.  Lab Results   Component Value Date    CREATININE 1.8 (H) 11/21/2018

## 2018-11-21 NOTE — ASSESSMENT & PLAN NOTE
- Valcyte for CMV prophylaxis--> holding (10/31) for leukopenia. Pt will need weekly CMV PCR while discontinued.  - CMV PCR noted to be positive at 129. .  - CMV PCR 11/8: 105  - CMV PCR 11/15 with 89 copies  - Next CMV PCR due 11/22  - Bactrim for PCP prophylaxis ([facility]).--> holding (10/31) for leukopenia.   - Start Atovaquone 11/6.  - Fluconazole for fungal prophylaxis (first dose 10/6). D/c Fluc 11/16 bc pt had been on it for > 30 days post take back.

## 2018-11-21 NOTE — PROCEDURES
Radiology Post-Procedure Note    Pre Op Diagnosis: Ascites  Post Op Diagnosis: Same    Procedure: Ultrasound Guided Paracentesis    Procedure performed by: ROSE Mccarty DNP  Written Informed Consent Obtained: Yes  Specimen Removed: YES clear yellow  Estimated Blood Loss: Minimal    Findings:   Successful paracentesis.  Albumin administered PRN per protocol.    Patient tolerated procedure well.

## 2018-11-21 NOTE — ASSESSMENT & PLAN NOTE
"- H&H cont to fluctuate.   - FOBT +.  - EGD 10/18 - showed diffuse bleeding with ulcerated duodenal mucosa.  - EGD 11/5 - improvement seen, no active bleeding.  - Resume heparin sq injections 11/6.   - Decrease heparin sq from TID to BID 11/15   - Heparin dc'd 11/17  - See "anemia requiring transfusions."  - Continue to monitor.         "

## 2018-11-21 NOTE — ASSESSMENT & PLAN NOTE
BG goal 140-180.       Low dose correction scale given kidney function.  BG monitoring AC/HS   novolog 5 units with meals if BG <120 (or call endocrine) or 10 units with meals if >120.     Start self/caregiver administration of insulin to review for home.     Discharge planning:  TBD

## 2018-11-21 NOTE — ASSESSMENT & PLAN NOTE
- Middle chevron incision opened 11/16 with purulent drainage  - Packed with Aquacel ag at this time. Growing Klebsiella p.   - CT abd/pelvis 11/17- no signs of infection  - 11/17: R side of chevron incision w/ 3 small holes draining purulent fluid, opened and packed w/ aquacel.   - Wound care following. Appreciate recs.

## 2018-11-21 NOTE — PROGRESS NOTES
[facility]  Liver Transplant  Progress Note    Patient Name: Scarlett Reddy  MRN: [id number]  Admission Date: 10/1/2018  Hospital Length of Stay: 50 days  Code Status: Full Code  Primary Care Provider: Primary Doctor No  Post-Operative Day: 47    ORGAN:   LIVER  Disease Etiology: Cirrhosis: Type B and D  Donor Type:    - Brain Death  CDC High Risk:   No  Donor CMV Status:   Donor CMV Status: Positive  Donor HBcAB:   Negative  Donor HCV Status:   Negative  Donor HBV SETH: Negative  Donor HCV SETH: Negative  Whole or Partial: Whole Liver  Biliary Anastomosis: End to End  Arterial Anatomy: Replaced Left Hepatic and Right Hepatic  Subjective:     History of Present Illness:  Ms. Reddy is a 69 y/o female with PMH of ESLD secondary Hep B and D.  Listed for liver transplant with MELD 23.  Paracentesis 10/1 with 5L removed, no fluid sent for analysis.  Morning labs significant for hyponatremia, Na 119.  Pt admitted to LTS for sodium monitoring.      Hospital Course: 69 y/o F h/o HBV/delta cirrhosis s/p DBDLT 10/5/18 (CMV D+/R+, steroid induction, MMF/tacro/pred maintenance) with take back on 10/6 2/2 hyperbilirubinema and bilious VALORIE drainage, no leak identified. Post-op course c/b hypercapneic and hypoxic respiratory failure and was reintubated though quickly extubated now doing well. Liver bx (10/6) sig for Zone 3 hemorrhage and collapse, in addition to cholestasis. Transferred from ICU on POD #4. ID consulted to comment on positive diphtheroid culture from ascitic fluid (likely skin colonization) as well as ESBL Klebsiella pneumoniae in urine culture (10/4).  Pt asymptomatic and cell count from ascitic fluid unremarkable. Per ID recs, no need to treat diphtheroids or asymptomatic ESBL Kleb pneumo bacteriuria though pt has had 6 days of therapy which would cover these organisms. Mild peritransplant ascites- repeat US 10/6 with increased arterial resistive indices. Repeat US 10/9 w/ continued  elevation in RIs and fluid collections. LFTs trending down nicely.     Acute gradual worsening of renal function s/p OLTx. Creatinine on arrival 0.8 and has been up trending. Nephrology consulted for post-op EVA. Cr cont to trend up but remains to have good UOP. Had stable response to lasiz diuresis.  BUN elevated requiring multiple rounds of dialysis (10/15, 10/17, 10/20, 10/31). No significant improvements in kidney function noted.   In addition, pt H/H cont to fluctuate requiring multiple blood transfusions (10/14, 10/16, 10/18, 10/19, 10/22, 10/27, 10/29).  Pt reported dark stools 10/15 but color has normalized. FOBT+. EGD 10/18 consistent with ulcerated mucosa in duodenum s/p coagulation. Concern for H. Pylori. Started Amoxicillin/levofloxacin (10/19, complete 11/2)). Remains on Protonix 40 mg BID. Biopsy negative for infection and CMV. Will cont close monitoring H/H, transfuse as needed with goal Hb > 7.0. Of note, Urine cx + Klebsiella pneumoniae (10/13). Received 3 day course of ciprofloxacin, dc'd 10/19.   On 10/19 pm pt c/o crushing chest pain. Troponin 0.094, likely 2/2 ischemic stress. EKG NSR.   Liver US 11/6 showed no arterial flow. Paracentesis 11/7 with 8.9 L removed. Wbc 39, segs 16%. Gram stain no organisms seen + no WBC. Pt feels better since having fluid removed but she continues to have worsening abdominal distention & LE edema.Repeat Liver US 11/8 showed arterial flow with increased RIs. Paracentesis 11/12 with 8.6L removed (WBC 34, segs 25%). 11/15 liver u/s showed abnormally decreased main hepatic artery peak systolic velocity with slow arterial flow and abnormal tardus parvus waveforms intrahepatically. Consulted vascular surgery. Repeat liver US 11/19 showed no arterial flow on R and sluggish flow on L. Hepatic angiogram 11/19 confirmed HAT.  Friday 11/16, purulent drainage noted from middle of chevron incision. Area opened and packed. Cx grew klebsiella p. CT scan showed no signs of  infection. On Monday 11/18, it was noted that the right/lateral side of the chevron had 3 small openings draining purulent fluid. Opened the area and packed. Wound care following.    Interval history: No acute events overnight. Pt has had two areas of chevron opened due to purulent drainage - one midline and one R/lateral, both packed and dressed. Wound care following. CT abd/pelvis 11/17 with no signs of infection. Remains afebrile. VSS. Pt still has significant abdominal distention and LE edema. However, renal fx continues to show improvement with Cr down to 1.8 this am. Adequate UOP. H/H 7/22.5. Will give 1uPRBC, continue Lasix 80 mg BID, and plan for paracentesis to remove 4L this afternoon. WBC improving. Cellcept restarted at 500 mg bid on 11/6. Most recent CMV PCR 11/15 with 87 copies. Continue to hold Valcyte. Monitor levels with weekly CMV PCR. Heparin dc'd 11/17. Pt ambulating. Replaced K + phos. Monitor.    Scheduled Meds:   aspirin  81 mg Oral Daily    atovaquone  1,500 mg Oral Daily    bisacodyl  10 mg Oral QHS    docusate sodium  100 mg Oral Daily    [START ON 11/22/2018] entecavir  0.5 mg Oral Every other day    furosemide  80 mg Oral BID    insulin aspart U-100  5-10 Units Subcutaneous TIDWM    k phos di & mono-sod phos mono  500 mg Oral BID    magnesium oxide  800 mg Oral BID    midodrine  15 mg Oral TID    mycophenolate  500 mg Oral BID    pantoprazole  40 mg Oral BID    polyethylene glycol  17 g Oral Daily    predniSONE  10 mg Oral Daily    Followed by    [START ON 11/24/2018] predniSONE  5 mg Oral Daily    Followed by    [START ON 12/1/2018] predniSONE  2.5 mg Oral Daily    tacrolimus  0.5 mg Oral BID    ursodiol  300 mg Oral BID     Continuous Infusions:  PRN Meds:sodium chloride, acetaminophen, bisacodyl, calcium carbonate, dextrose 50%, dextrose 50%, glucagon (human recombinant), glucose, glucose, insulin aspart U-100, ondansetron, ondansetron, polyethylene glycol, sodium  chloride 0.9%, traMADol    Review of Systems   Constitutional: Positive for activity change, appetite change (improving) and fatigue. Negative for chills and fever.   HENT: Negative for congestion, facial swelling and trouble swallowing.    Eyes: Negative for pain, discharge and visual disturbance.   Respiratory: Negative for cough, chest tightness, shortness of breath and wheezing.    Cardiovascular: Positive for leg swelling. Negative for chest pain and palpitations.   Gastrointestinal: Positive for abdominal distention and abdominal pain. Negative for constipation, diarrhea, nausea and vomiting.   Endocrine: Negative.    Genitourinary: Negative for decreased urine volume, difficulty urinating and dysuria.   Musculoskeletal: Negative for arthralgias, back pain, neck pain and neck stiffness.   Skin: Positive for wound.   Allergic/Immunologic: Positive for immunocompromised state.   Neurological: Positive for weakness. Negative for tremors and headaches.   Psychiatric/Behavioral: Negative for confusion and sleep disturbance.     Objective:     Vital Signs (Most Recent):  Temp: 98.3 °F (36.8 °C) (11/21/18 1215)  Pulse: 84 (11/21/18 1200)  Resp: 13 (11/21/18 1200)  BP: (!) 91/55 (11/21/18 1200)  SpO2: 99 % (11/21/18 1200) Vital Signs (24h Range):  Temp:  [97.5 °F (36.4 °C)-98.3 °F (36.8 °C)] 98.3 °F (36.8 °C)  Pulse:  [] 84  Resp:  [13-18] 13  SpO2:  [95 %-99 %] 99 %  BP: ()/(55-71) 91/55     Weight: 63.2 kg (139 lb 5.3 oz)  Body mass index is 24.68 kg/m².    Intake/Output - Last 3 Shifts       11/19 0700 - 11/20 0659 11/20 0700 - 11/21 0659 11/21 0700 - 11/22 0659    P.O.  920     I.V. (mL/kg) 600 (9.5) 20 (0.3)     Total Intake(mL/kg) 600 (9.5) 940 (14.9)     Urine (mL/kg/hr)  1700 (1.1)     Stool  0     Total Output  1700     Net +600 -760            Stool Occurrence  0 x           Physical Exam   Constitutional: She is oriented to person, place, and time. She appears well-developed. No distress.    Temporal and distal extremity muscle wasting   HENT:   Head: Normocephalic and atraumatic.   Eyes: EOM are normal. No scleral icterus.   Neck: Normal range of motion.   Cardiovascular: Normal rate, regular rhythm and normal heart sounds.   No murmur heard.  Pulmonary/Chest: Effort normal. No respiratory distress. She has no wheezes.   Breath sounds decreased at bases   Abdominal: Soft. Bowel sounds are normal. She exhibits distension. There is tenderness. There is no guarding.   Purulent drainage expressed from middle chevron incision + R lateral side, both areas with packing in place.  Minimal tissue erythema  RLQ VALORIE drain sites c/d/i with sutures   Musculoskeletal: Normal range of motion. She exhibits edema (2+ BLE, + generalized).   Neurological: She is alert and oriented to person, place, and time.   Skin: Skin is warm. She is not diaphoretic.   Psychiatric: She has a normal mood and affect.   Nursing note and vitals reviewed.      Laboratory:  Immunosuppressants         Stop Route Frequency     tacrolimus capsule 0.5 mg      -- Oral 2 times daily     mycophenolate capsule 500 mg      -- Oral 2 times daily        CBC:   Recent Labs   Lab 11/21/18  0832   WBC 4.81   RBC 2.39*   HGB 7.0*   HCT 22.5*   *   MCV 94   MCH 29.3   MCHC 31.1*     CMP:   Recent Labs   Lab 11/21/18  0639   *   CALCIUM 8.6*   ALBUMIN 3.4*   PROT 4.9*   *   K 3.2*   CO2 27   CL 92*   BUN 75*   CREATININE 1.8*   ALKPHOS 76   ALT 5*   AST 12   BILITOT 1.1*     Labs within the past 24 hours have been reviewed.    Diagnostic Results:  I have personally reviewed all pertinent imaging studies.    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   - POD 1 US: increased arterial resistive indices, suggestive of edema vs acute rejection vs congestion.  - Repeat US  "10/9 with continued elevation of RIs.  - LFTs stable.  - T bili trending down.   - Repeat liver US (10/11): elevated RIs.  - Liver US 11/6 to assess for ascites. No arterial flow within the liver allograft concerning for interval thrombosis/occlusion of the arterial system. Severe ascites seen.  - Liver US 11/8: arterial flow seen w/ elevated RIs   - LFTs remain stable. No CTA at this time due to EVA and normal liver function.   - Liver US 11/15 showed "abnormally decreased main hepatic artery peak systolic velocity with slow arterial flow and abnormal tardus parvus waveforms intrahepatically."   -Vascular surgery consulted  - Liver US 11/19 showed no arterial flow on the right and sluggish flow on the left.   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 No further intervention planned at this time as patient's LFTs are stable. Will monitor.         Hypokalemia    - K-lyte 50 mg qd given 11/20 & 11/21.  - Continue to monitor & replace as needed.     Hypophosphatemia    - Phos 2.0 11/21 - Kphos 500 mg x 2 doses  - Continue to monitor & replace as needed.       Delayed surgical wound healing    - Middle chevron incision opened 11/16 with purulent drainage  - Packed with Aquacel ag at this time. Growing Klebsiella p.   - CT abd/pelvis 11/17- no signs of infection  - 11/17: R side of chevron incision w/ 3 small holes draining purulent fluid, opened and packed w/ aquacel.   - Wound care following. Appreciate recs.          Hepatic artery thrombosis of transplanted liver    - Liver US 11/19 showed no arterial flow to R and sluggish flow to L  - Hepatic angiogram 11/19 confirmed HAT  - No further intervention planned at this time. Will continue to monitor.     Thrombocytopenia, unspecified    - Improving.  - Monitor.       Alteration in skin integrity    Monitor.       Leukopenia    - Stopped Cellcept, Bactrim, and Valcyte 10/31 2/2 leukopenia  - Started Atovaquone 11/6.   - CMV PCR noted to be positive at 129. " "Will hold off on treatment with valcyte at this time.  - Continue to monitor.       Azotemia    - CRRT 10/31 for uremic toxin clearance.           UTI (urinary tract infection)    - Urine Cx 10/15 w/ Klebsiella pneumonia.  - Started Ciprofloxacin (10/17-10/19).   - Started on amoxicillin/levo for H. Pylori (completed abx 11/2).  - Pt asymptomatic at this time.       Acute blood loss anemia    - H&H cont to fluctuate.   - FOBT +.  - EGD 10/18 - showed diffuse bleeding with ulcerated duodenal mucosa.  - EGD 11/5 - improvement seen, no active bleeding.  - Resume heparin sq injections 11/6.   - Decrease heparin sq from TID to BID 11/15   - Heparin dc'd 11/17  - See "anemia requiring transfusions."  - Continue to monitor.             Post LT - Acute renal failure    - Creatinine on arrival 0.8 and trended up.  - Nephrology consulted.  - Renal US (10/11): no hydronephrosis.  - Echo 10/12: diastolic dysfunction, likely 2/2 to ARF.  - CRRT 10/14; SLED 10/17, 10/20. --> no improvements noted in kidney fxn.  - 24 hr urine creatine collection: CrCl of 7.   - CRRT 10/31 with 1L removed.   - See "hypervolemia associated with renal insufficiency"     Hypervolemia associated with renal insufficiency    - CRRT 10/14, 10/17, 10/20, 10/31.  - CXR 10/30: abundant fluid in b/l pleural space.  - Continue 80 mg Lasix BID.  - Monitor daily weights.       EVA (acute kidney injury)    - No plans for RRT in near future, per nephrology  - Trialysis line removed 11/13  - Nephrology involved. Appreciate recs.  - Renal fx showing improvement   - Monitor.     Metabolic acidosis    - CRRT 10/14.  - SLED 10/17, 10/20, 10/31.         At risk for opportunistic infections    - Valcyte for CMV prophylaxis--> holding (10/31) for leukopenia. Pt will need weekly CMV PCR while discontinued.  - CMV PCR noted to be positive at 129. .  - CMV PCR 11/8: 105  - CMV PCR 11/15 with 89 copies  - Next CMV PCR due 11/22  - Bactrim for PCP prophylaxis (MW).--> " holding (10/31) for leukopenia.   - Start Atovaquone 11/6.  - Fluconazole for fungal prophylaxis (first dose 10/6). D/c Fluc 11/16 bc pt had been on it for > 30 days post take back.       Long-term use of immunosuppressant medication    - Prograf: stopped 10/29. Resumed 11/2.  --> Will monitor for signs of toxic side effects, check daily troughs, and change meds accordingly.   - Prednisone: stopped 10/29. Started on Hydrocortisone. pred taper resumed.  - Cellcept: stopped 10/31 for leukopenia. Restarted Cellcept 500 mg bid, 11/6.         Prophylactic immunotherapy    - See long term use immunosuppresion.       Ascites    - Paracentesis 10/1, 5L removed, no fluid sent for analysis.  - Pt remains volume overloaded.   - Liver US 11/6 with severe ascites.   - Paracentesis 11/7 with 8.9 L removed. Wbc 39, segs 16%. Gram stain - no organisms seen and no WBC. Cultures NGTD.  - Paracentesis 11/12: 8.6 L removed. 34 wbc, 25% segs, 33 lymphs, 42 monocytes/macrophages. Cultures NGTD  - Paracentesis planned for 11/21 to remove 4 L.     Weakness    - See physical deconditioning.       Physical deconditioning    - PT/OT consulted.  - Recommend home health PT and rolling walker at discharge (orders placed 11/6).       Hyponatremia    - Na 119 on admit.  - Na was improving post transplant w HD.  - Na stable at this time.             Hypotension    - continue Midodrine 15 mg TID. Monitor       Anemia requiring transfusions    - Fluctuating H&H.    - Transfusions: 10/14; 10/16; 10/18; 10/19; 10/22; 10/27; 10/29, 10/31, 11/3, 11/21  - FOBT +  - LDH elevated, Hapto < 10.  - EGD 10/18: ulcerated duodenal mucosa.   - Consulted GI about this ongoing issue and further need of another EGD.  - EGD 11/5 unremarkable.   - Iron studies 11/13:  95, TIBC 107, sat iron 89, transferrin 72, ferritin pending  - Hemolytic anemia workup 11/13: Haptoglobin 132, retic 3.9   - H/H decreased to 7/22.5 11/21. Will transfuse 1upRBC  - Will continue to  monitor closely.           Severe malnutrition    - Dietary consulted.   - Temporal and distal extremity muscle wasting and edema.  - Encourage supplements and to increase nutritional intake.  - Per nephrology, pt to be on low protein diet.   - Prealbumin reviewed.      Type 2 diabetes mellitus with diabetic polyneuropathy, with long-term current use of insulin    - Continue home regimen.  - Endocrine consulted.  - Pt off insulin drip at this time.  Apprec endo recs.        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  - Tx w/ Entecavir- dose changed to q72 hours given renal function.             VTE Risk Mitigation (From admission, onward)        Ordered     IP VTE HIGH RISK PATIENT  Once      11/05/18 1145     Place sequential compression device  Until discontinued      10/05/18 0709          The patients clinical status was discussed at multidisplinary rounds, involving transplant surgery, transplant medicine, pharmacy, nursing, nutrition, and social work    Discharge Planning:  No Patient Care Coordination Note on file.      Elizabeth Dean PA-C  Liver Transplant  [facility]

## 2018-11-21 NOTE — H&P
Radiology History & Physical      SUBJECTIVE:     Chief Complaint: abdominal distention    History of Present Illness:  Scarlett Reddy is a 68 y.o. female who presents for eval of ascites  Past Medical History:   Diagnosis Date    Anemia requiring transfusions 8/16/2018    Ascites     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 8/15/2018    Cirrhosis     Cough 8/30/2018    Esophageal varices     Esophageal varices without bleeding 8/15/2018    Hepatic encephalopathy     Hepatitis B     Hepatitis D virus infection     Hypersplenism     Hyponatremia     Hypotension     Liver transplant candidate     Neutropenia     Portal hypertension     Severe malnutrition 8/16/2018    Type 2 diabetes mellitus, with long-term current use of insulin 8/15/2018     Past Surgical History:   Procedure Laterality Date    ANGIOGRAM-HEPATIC Left 11/19/2018    Performed by ORAL Edgar III, MD at [facility] OR [address] [place] [address]    ANGIOGRAPHY OF LIVER Left 11/19/2018    Procedure: ANGIOGRAM-HEPATIC;  Surgeon: ORAL Edgar III, MD;  Location: [facility] OR [address] [place] [address];  Service: Peripheral Vascular;  Laterality: Left;  13.7 min  436.10 mGy  24ml Dye  3ml Local    EGD (ESOPHAGOGASTRODUODENOSCOPY) N/A 11/5/2018    Performed by Johann Mejia MD at [facility] (2ND FLR)    EGD (ESOPHAGOGASTRODUODENOSCOPY) N/A 10/18/2018    Performed by Chung Robins MD at [facility] (2ND FLR)    EGD (ESOPHAGOGASTRODUODENOSCOPY) N/A 8/17/2018    Performed by Travon Delgadillo MD at [facility] (2ND FLR)    ESOPHAGOGASTRODUODENOSCOPY N/A 8/17/2018    Procedure: EGD (ESOPHAGOGASTRODUODENOSCOPY);  Surgeon: Travon Delgadillo MD;  Location: [address]);  Service: Endoscopy;  Laterality: N/A;    ESOPHAGOGASTRODUODENOSCOPY N/A 10/18/2018    Procedure: EGD (ESOPHAGOGASTRODUODENOSCOPY);  Surgeon: Chung Robins MD;  Location: [address]);  Service: Endoscopy;  Laterality: N/A;    ESOPHAGOGASTRODUODENOSCOPY N/A 11/5/2018    Procedure: EGD  (ESOPHAGOGASTRODUODENOSCOPY);  Surgeon: Johann Mejia MD;  [facility] [place] [address]);  Service: Endoscopy;  Laterality: N/A;    EXPLORATORY LAPAROTOMY AFTER LIVER TRANSPLANTATION N/A 10/6/2018    Procedure: LAPAROTOMY, EXPLORATORY, AFTER LIVER TRANSPLANT;  Surgeon: Benson Matson MD;  Location: [facility] OR [address] [place] [address];  Service: Transplant;  Laterality: N/A;    LAPAROTOMY, EXPLORATORY, AFTER LIVER TRANSPLANT N/A 10/6/2018    Performed by Benson Matson MD at [facility] OR [address] [place] [address]    LIVER TRANSPLANT N/A 10/4/2018    Procedure: TRANSPLANT, LIVER;  Surgeon: Yony Burns MD;  Location: [facility] OR [address] [place] [address];  Service: Transplant;  Laterality: N/A;    TRANSPLANT, LIVER N/A 10/4/2018    Performed by Yony Burns MD at [facility] OR [address] [place] [address]       Home Meds:   Prior to Admission medications    Medication Sig Start Date End Date Taking? Authorizing Provider   insulin aspart U-100 (NOVOLOG FLEXPEN U-100 INSULIN) 100 unit/mL InPn pen Inject 20 units into the skin with breakfast,17 units with lunch, and 17 with dinner Plus correction scale, max TDD 60 units daily 9/27/18  Yes Seble Tineo NP   insulin glargine (LANTUS) 100 unit/mL injection Inject 20 Units into the skin every evening. 9/27/18  Yes Seble Tineo NP   midodrine (PROAMATINE) 5 MG Tab Take 3 tablets (15 mg total) by mouth every 8 (eight) hours. 8/24/18 2/20/19 Yes Gigi Sprague MD   OMEGA-3 FATTY ACIDS-EPA ORAL Take by mouth.   Yes Historical Provider, MD   rifAXIMin (XIFAXAN) 550 mg Tab Take 1 tablet (550 mg total) by mouth 2 (two) times daily. 8/27/18  Yes Rita Rubin NP   sodium bicarbonate 650 MG tablet Take 2 tablets (1,300 mg total) by mouth 3 (three) times daily. 9/21/18  Yes Gigi Sprague MD   blood sugar diagnostic (ACCU-CHEK DC) Strp 1 strip by Misc.(Non-Drug; Combo Route) route 3 (three) times daily.    Historical Provider, MD   entecavir (BARACLUDE) 0.5 MG Tab Take 1 tablet (0.5 mg total) by mouth once daily. 10/5/18   Benson ELLISON  "MD Dano   ergocalciferol (VITAMIN D2) 50,000 unit Cap Take 1 capsule (50,000 Units total) by mouth every 7 days. 10/5/18   Benson Matson MD   famotidine (PEPCID) 20 MG tablet Take 1 tablet (20 mg total) by mouth once daily. 10/5/18   Benson Matson MD   mycophenolate (CELLCEPT) 250 mg Cap Take 4 capsules (1,000 mg total) by mouth 2 (two) times daily. 10/5/18   Benson Matson MD   pen needle, diabetic (BD ULTRA-FINE CITLALI PEN NEEDLE) 32 gauge x 5/32" Ndle To use to inject insulin 4x daily 9/14/18   Rita Rubin, KEEGAN   predniSONE (DELTASONE) 10 MG tablet Take by mouth daily: 20mg 10/10-10/16, 15mg 10/17-10/23, 10mg 10/24-10/30, 5mg 10/31-11/6, 2.5mg 11/7-11/13, Stop 11/14 10/5/18   Benson Matson MD   sulfamethoxazole-trimethoprim 400-80mg (BACTRIM,SEPTRA) 400-80 mg per tablet Take 1 tablet by mouth once daily. STOP 4/3/19 10/5/18 4/3/19  Benson Matson MD   tacrolimus (PROGRAF) 1 MG Cap Take 6 capsules (6 mg total) by mouth every 12 (twelve) hours. 10/5/18   Benson Matson MD   valGANciclovir (VALCYTE) 450 mg Tab Take 1 tablet (450 mg total) by mouth once daily. STOP 1/3/19 10/5/18 1/3/19  Benson Matson MD     Anticoagulants/Antiplatelets: no anticoagulation    Allergies: Review of patient's allergies indicates:  No Known Allergies  Sedation History:  no adverse reactions    Review of Systems:   Hematological: no known coagulopathies  Respiratory: no shortness of breath  Cardiovascular: no chest pain  Gastrointestinal: no abdominal pain  Genito-Urinary: no dysuria  Musculoskeletal: negative  Neurological: no TIA or stroke symptoms         OBJECTIVE:     Vital Signs (Most Recent)  Temp: 98.4 °F (36.9 °C) (11/21/18 1430)  Pulse: 92 (11/21/18 1430)  Resp: 13 (11/21/18 1430)  BP: (!) 96/58 (11/21/18 1430)  SpO2: 95 % (11/21/18 1430)    Physical Exam:  ASA: 2  Mallampati: na    General: no acute distress  Mental Status: alert and oriented to person, place " and time  HEENT: normocephalic, atraumatic  Chest: unlabored breathing  Heart: regular heart rate  Abdomen: distended  Extremity: moves all extremities    Laboratory  Lab Results   Component Value Date    INR 0.9 11/15/2018       Lab Results   Component Value Date    WBC 4.81 11/21/2018    HGB 7.0 (L) 11/21/2018    HCT 22.5 (L) 11/21/2018    MCV 94 11/21/2018     (L) 11/21/2018      Lab Results   Component Value Date     (H) 11/21/2018     (L) 11/21/2018    K 3.2 (L) 11/21/2018    CL 92 (L) 11/21/2018    CO2 27 11/21/2018    BUN 75 (H) 11/21/2018    CREATININE 1.8 (H) 11/21/2018    CALCIUM 8.6 (L) 11/21/2018    MG 2.3 11/21/2018    ALT 5 (L) 11/21/2018    AST 12 11/21/2018    ALBUMIN 3.4 (L) 11/21/2018    BILITOT 1.1 (H) 11/21/2018    BILIDIR 11.8 (H) 10/08/2018       ASSESSMENT/PLAN:     Sedation Plan: local  Patient will undergo US guided paracentesis

## 2018-11-21 NOTE — PROGRESS NOTES
TIMBO met with pt, pt's spouse, and  (Dunia) in room this morning during TSU rounds to f/u for continuity of care.  Pt and spouse deny any coping issues at this time.  Pt expressed concern regarding having her medication regimen updated and accurate prior to d/c from hospital.  TIMBO mentioned, per TSU rounds, PharmD and MD will plan to meet with pt, spouse, and  today to review medication and get update on pt's plan of care.  TIMBO also mentioned to Dunia, per PA Elizabeth this morning, pt's daughter requesting update as well.  Dunia and pt reported being unaware of same.  Per Dunia, pt's  will be receiving education on pulling meds and will not be utilizing hired caregiver (Bobbi) for such care.  Per Dunia, pt's hired caregiver Bobbi has been hired to assist with household tasks and community ADLs.  Bobbi currently does not work a set schedule with pt and only assist pt a few hours at a time.  Per Dunia, pt does not have any other persons providing caregiver support at this time.  Dunia inquired about pt's request for rolling walker and commode for pt's d/c from hospital.  TIMBO informed Dunia/pt/spouse that per Diane with [employer] insurance DME is not covered and would have to be purchased out-of-pocket by pt.  Dunia stated plans of looking up prices at [employer].  TIMBO stated plans of contacting Elvi at [facility] to inquire about providing pt with requested DME prior to d/c.  SW to f/u with Dunia/pt with updates on same.  Pt/spouse deny having any additional psychosocial concerns or questions at this time.  TIMBO remains available for further psychosocial support and will f/u as planned.

## 2018-11-21 NOTE — PROGRESS NOTES
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 Post-op course c/b hypercapneic and hypoxic respiratory failure and was reintubated though quickly extubated now doing well. Liver bx (10/6) sig for Zone 3 hemorrhage and collapse, in addition to cholestasis. Transferred from ICU on POD #4. ID consulted to comment on positive diphtheroid culture from ascitic fluid (likely skin colonization) as well as ESBL Klebsiella pneumoniae in urine culture (10/4).  Pt asymptomatic and cell count from ascitic fluid unremarkable. Per ID recs, no need to treat diphtheroids or asymptomatic ESBL Kleb pneumo bacteriuria though pt has had 6 days of therapy which would cover these organisms. Mild peritransplant ascites- repeat US 10/6 with increased arterial resistive indices. Repeat US 10/9 w/ continued elevation in RIs and fluid collections. LFTs trending down nicely.      Acute gradual worsening of renal function s/p OLTx.Creatinine on arrival 0.8 and has been up trending. Nephrology consulted for post-op EVA. Cr cont to trend up but remains to have good UOP. Had stable response to lasiz diuresis.  BUN elevated requiring multiple rounds of dialysis (10/15, 10/17, 10/20, 10/31). No significant improvements in kidney function noted.   In addition, pt H/H cont to fluctuate requiring multiple blood transfusions (10/14, 10/16, 10/18, 10/19, 10/22, 10/27, 10/29).  Pt reported dark stools 10/15 but color has normalized. FOBT+. EGD 10/18 consistent with ulcerated mucosa in duodenum s/p  coagulation. Concern for H. Pylori. Started Amoxicillin/levofloxacin (10/19, complete 11/2)). Remains on Protonix 40 mg BID. Biopsy negative for infection and CMV. Will cont close monitoring H/H, transfuse as needed with goal Hb > 7.0.  Of note, Urine cx + Klebsiella pneumoniae (10/13). Received 3 day course of ciprofloxacin, dc'd 10/19.   On 10/19 pm pt c/o crushing chest pain. Troponin 0.094, likely 2/2 ischemic stress. EKG NSR.      11/5/18 - no acute events overnight. Pt reports feeling well this am. One unit of PRBCs given 11/3 with good response. Cr level noted with only minimal increases. Lasix placed on hold per nephrology recs. Will plan to hydrate today w/ albumin. EGD 11/5 unimpressive for overt bleed. Pt remains on protonix 40 BID. Denies melena. Cont to hold heparin. WBC cont to decrease. Immunosuppression managed with hydrocortisone 50 (cont to hold Cellcept, bactrim, valcyte). Prograf now restarted and transitioned IV steroids to PO prednisone on 11/3. CMV PCR noted with 129. Cont to monitor.     Interval History:   No acute events overnight, feeling well this am. UOP much better overnight 1700 ml UOP overnight fluid balance not accurate. Had hepatic angiogram yesterday with complet occlusion. Renal function keeps improving, sCr improved to 1.7 mg/dL.     Review of patient's allergies indicates:  No Known Allergies  Current Facility-Administered Medications   Medication Frequency    0.9%  NaCl infusion (for blood administration) Q24H PRN    acetaminophen tablet 650 mg Q6H PRN    aspirin chewable tablet 81 mg Daily    atovaquone suspension 1,500 mg Daily    bisacodyl EC tablet 10 mg QHS    bisacodyl suppository 10 mg Daily PRN    calcium carbonate 200 mg calcium (500 mg) chewable tablet 500 mg TID PRN    dextrose 50% injection 12.5 g PRN    dextrose 50% injection 25 g PRN    docusate sodium capsule 100 mg Daily    entecavir tablet 0.5 mg Q72H    furosemide tablet 80 mg BID    glucagon  (human recombinant) injection 1 mg PRN    glucose chewable tablet 16 g PRN    glucose chewable tablet 24 g PRN    insulin aspart U-100 pen 0-5 Units QID (AC + HS) PRN    insulin aspart U-100 pen 5-10 Units TIDWM    k phos di & mono-sod phos mono 250 mg tablet 2 tablet BID    magnesium oxide tablet 800 mg BID    midodrine tablet 15 mg TID    mycophenolate capsule 500 mg BID    ondansetron disintegrating tablet 8 mg Q8H PRN    ondansetron injection 4 mg Q8H PRN    pantoprazole EC tablet 40 mg BID    polyethylene glycol packet 17 g Daily PRN    polyethylene glycol packet 17 g Daily    predniSONE tablet 10 mg Daily    Followed by    [START ON 11/24/2018] predniSONE tablet 5 mg Daily    Followed by    [START ON 12/1/2018] predniSONE tablet 2.5 mg Daily    sodium chloride 0.9% flush 3 mL PRN    tacrolimus capsule 0.5 mg BID    traMADol tablet 50 mg Q6H PRN    ursodiol capsule 300 mg BID       Objective:     Vital Signs (Most Recent):  Temp: 97.8 °F (36.6 °C) (11/21/18 0740)  Pulse: 103 (11/21/18 0830)  Resp: 18 (11/21/18 0830)  BP: (!) 96/58 (11/21/18 0800)  SpO2: 97 % (11/21/18 0830)  O2 Device (Oxygen Therapy): room air (11/21/18 0415) Vital Signs (24h Range):  Temp:  [97.8 °F (36.6 °C)-98.2 °F (36.8 °C)] 97.8 °F (36.6 °C)  Pulse:  [] 103  Resp:  [14-18] 18  SpO2:  [95 %-98 %] 97 %  BP: ()/(58-71) 96/58     Weight: 63.2 kg (139 lb 5.3 oz) (11/19/18 0647)  Body mass index is 24.68 kg/m².  Body surface area is 1.68 meters squared.    I/O last 3 completed shifts:  In: 940 [P.O.:920; I.V.:20]  Out: 1700 [Urine:1700]    Physical Exam   Constitutional: She is oriented to person, place, and time. She appears well-developed. No distress.   Temporal and distal extremity muscle wasting   HENT:   Head: Normocephalic and atraumatic.   Eyes: EOM are normal. No scleral icterus.   Neck: Normal range of motion.   Cardiovascular: Normal rate and regular rhythm.   No murmur heard.  Pulmonary/Chest: Effort  normal. No respiratory distress. She has no wheezes.   Abdominal: Bowel sounds are normal. She exhibits distension and ascites. There is tenderness. There is no guarding.   Chevron incision w/ steri strips, minimal serous leakage from R side   RLQ VALORIE drain sites c/d/i with sutures   Musculoskeletal: Normal range of motion. She exhibits edema (1+ BLE, + generalized improving).   Neurological: She is alert and oriented to person, place, and time.   Skin: Skin is warm. She is not diaphoretic.   Psychiatric: She has a normal mood and affect.   Nursing note and vitals reviewed.    Interval History:   No acute events overnight. UOP 1050 ml UOP overnight fluid balance -450 ml yesterday. sCr very slowly improving. [facility]. She feels well.     Review of patient's allergies indicates:  No Known Allergies  Current Facility-Administered Medications   Medication Frequency    0.9%  NaCl infusion (for blood administration) Q24H PRN    acetaminophen tablet 650 mg Q6H PRN    aspirin chewable tablet 81 mg Daily    atovaquone suspension 1,500 mg Daily    bisacodyl EC tablet 10 mg QHS    bisacodyl suppository 10 mg Daily PRN    calcium carbonate 200 mg calcium (500 mg) chewable tablet 500 mg TID PRN    dextrose 50% injection 12.5 g PRN    dextrose 50% injection 25 g PRN    docusate sodium capsule 100 mg Daily    entecavir tablet 0.5 mg Q72H    furosemide tablet 80 mg BID    glucagon (human recombinant) injection 1 mg PRN    glucose chewable tablet 16 g PRN    glucose chewable tablet 24 g PRN    insulin aspart U-100 pen 0-5 Units QID (AC + HS) PRN    insulin aspart U-100 pen 5-10 Units TIDWM    k phos di & mono-sod phos mono 250 mg tablet 2 tablet BID    magnesium oxide tablet 800 mg BID    midodrine tablet 15 mg TID    mycophenolate capsule 500 mg BID    ondansetron disintegrating tablet 8 mg Q8H PRN    ondansetron injection 4 mg Q8H PRN    pantoprazole EC  tablet 40 mg BID    polyethylene glycol packet 17 g Daily PRN    polyethylene glycol packet 17 g Daily    predniSONE tablet 10 mg Daily    Followed by    [START ON 11/24/2018] predniSONE tablet 5 mg Daily    Followed by    [START ON 12/1/2018] predniSONE tablet 2.5 mg Daily    sodium chloride 0.9% flush 3 mL PRN    tacrolimus capsule 0.5 mg BID    traMADol tablet 50 mg Q6H PRN    ursodiol capsule 300 mg BID       Objective:     Vital Signs (Most Recent):  Temp: 97.8 °F (36.6 °C) (11/21/18 0740)  Pulse: 103 (11/21/18 0830)  Resp: 18 (11/21/18 0830)  BP: (!) 96/58 (11/21/18 0800)  SpO2: 97 % (11/21/18 0830)  O2 Device (Oxygen Therapy): room air (11/21/18 0415) Vital Signs (24h Range):  Temp:  [97.8 °F (36.6 °C)-98.2 °F (36.8 °C)] 97.8 °F (36.6 °C)  Pulse:  [] 103  Resp:  [14-18] 18  SpO2:  [95 %-98 %] 97 %  BP: ()/(58-71) 96/58     Weight: 63.2 kg (139 lb 5.3 oz) (11/19/18 0647)  Body mass index is 24.68 kg/m².  Body surface area is 1.68 meters squared.    I/O last 3 completed shifts:  In: 940 [P.O.:920; I.V.:20]  Out: 1700 [Urine:1700]    Physical Exam   Constitutional: She is oriented to person, place, and time. She appears well-developed. No distress.   Temporal and distal extremity muscle wasting   HENT:   Head: Normocephalic and atraumatic.   Eyes: EOM are normal. No scleral icterus.   Neck: Normal range of motion.   Cardiovascular: Normal rate and regular rhythm.   No murmur heard.  Pulmonary/Chest: Effort normal. No respiratory distress. She has no wheezes.   Abdominal: Bowel sounds are normal. She exhibits distension has some ascitis. There is tenderness. There is no guarding.   Chevron incision w/ steri strips, minimal serous leakage from R side   RLQ VALORIE drain sites c/d/i with sutures   Musculoskeletal: Normal range of motion. She exhibits edema (1+ BLE, + generalized).   Neurological: She is alert and oriented to person, place, and time.   Skin: Skin is warm. She is not diaphoretic.    Psychiatric: She has a normal mood and affect.   Nursing note and vitals reviewed.      Significant Labs:  CBC:   Recent Labs   Lab 11/21/18  0832   WBC 4.81   RBC 2.39*   HGB 7.0*   HCT 22.5*   *   MCV 94   MCH 29.3   MCHC 31.1*     CMP:   Recent Labs   Lab 11/21/18  0639   *   CALCIUM 8.6*   ALBUMIN 3.4*   PROT 4.9*   *   K 3.2*   CO2 27   CL 92*   BUN 75*   CREATININE 1.8*   ALKPHOS 76   ALT 5*   AST 12   BILITOT 1.1*     All labs within the past 24 hours have been reviewed.     Significant Imaging:  US Liver Transplant Impression       Abnormally decreased main hepatic artery peak systolic velocity with slow arterial flow and abnormal tardus parvus waveforms intrahepatically.  Findings concerning for developing hepatic artery stenosis/rejection.  Recommend continued close follow-up with consideration of possible contrast enhanced triple-phase abdominal CT for further evaluation if clinically indicated.    Peritransplant fluid collection as above.    Grossly stable ascites.    Right-sided pleural effusion.    This report was flagged in Epic as abnormal.    Electronically signed by resident: Ciro Love  Date: 11/15/2018  Time: 10:19    Electronically signed by: Yvan Watkins MD  Date: 11/15/2018  Time: 10:29         Significant Labs:  CBC:   Recent Labs   Lab 11/21/18  0832   WBC 4.81   RBC 2.39*   HGB 7.0*   HCT 22.5*   *   MCV 94   MCH 29.3   MCHC 31.1*     CMP:   Recent Labs   Lab 11/21/18  0639   *   CALCIUM 8.6*   ALBUMIN 3.4*   PROT 4.9*   *   K 3.2*   CO2 27   CL 92*   BUN 75*   CREATININE 1.8*   ALKPHOS 76   ALT 5*   AST 12   BILITOT 1.1*     All labs within the past 24 hours have been reviewed.     Significant Imaging:  US Liver Transplant Impression       Abnormally decreased main hepatic artery peak systolic velocity with slow arterial flow and abnormal tardus parvus waveforms intrahepatically.  Findings concerning for developing hepatic artery  stenosis/rejection.  Recommend continued close follow-up with consideration of possible contrast enhanced triple-phase abdominal CT for further evaluation if clinically indicated.    Peritransplant fluid collection as above.    Grossly stable ascites.    Right-sided pleural effusion.    This report was flagged in Epic as abnormal.    Electronically signed by resident: Ciro Love  Date: 11/15/2018  Time: 10:19    Electronically signed by: Yvan Watkins MD  Date: 11/15/2018  Time: 10:29         Assessment/Plan:      Post LT - Acute renal failure    Post-LT EVA is multifactorial in origin and has been related to the severity of liver disease, toxicity of immunosuppressive therapy and hepatic ischaemia reperfusion injury might be a driving force in the aetiology of post-LT EVA.     Plan:  - No indication for RRT  - Improved UOP to 1700 ml documented   - Improved sCr 1.7 mg/dL suggest renal recovery  - Hepatic angiogram done 24 ml of hypo-osmotic contrast 11/19/2018  - No uremic symptoms or signs.   - Patient's pitting LE edema slowly improving and evidence of fluid overload, but OS requirements as stable  -  Will cont to trend Renal fx and electrolytes   - Continue furosemide at 80 mg PO BID dose, edema improving  - Accutate I/O's and daily standing am weights  - Will monitor labs from a distance tomorrow but please feel free to call with questions         Thank you for your consult. I will follow-up with patient. Please contact us if you have any additional questions.    Dakota Garcia MD  Nephrology  [facility]

## 2018-11-21 NOTE — SUBJECTIVE & OBJECTIVE
Scheduled Meds:   aspirin  81 mg Oral Daily    atovaquone  1,500 mg Oral Daily    bisacodyl  10 mg Oral QHS    docusate sodium  100 mg Oral Daily    [START ON 11/22/2018] entecavir  0.5 mg Oral Every other day    furosemide  80 mg Oral BID    insulin aspart U-100  5-10 Units Subcutaneous TIDWM    k phos di & mono-sod phos mono  500 mg Oral BID    magnesium oxide  800 mg Oral BID    midodrine  15 mg Oral TID    mycophenolate  500 mg Oral BID    pantoprazole  40 mg Oral BID    polyethylene glycol  17 g Oral Daily    predniSONE  10 mg Oral Daily    Followed by    [START ON 11/24/2018] predniSONE  5 mg Oral Daily    Followed by    [START ON 12/1/2018] predniSONE  2.5 mg Oral Daily    tacrolimus  0.5 mg Oral BID    ursodiol  300 mg Oral BID     Continuous Infusions:  PRN Meds:sodium chloride, acetaminophen, bisacodyl, calcium carbonate, dextrose 50%, dextrose 50%, glucagon (human recombinant), glucose, glucose, insulin aspart U-100, ondansetron, ondansetron, polyethylene glycol, sodium chloride 0.9%, traMADol    Review of Systems   Constitutional: Positive for activity change, appetite change (improving) and fatigue. Negative for chills and fever.   HENT: Negative for congestion, facial swelling and trouble swallowing.    Eyes: Negative for pain, discharge and visual disturbance.   Respiratory: Negative for cough, chest tightness, shortness of breath and wheezing.    Cardiovascular: Positive for leg swelling. Negative for chest pain and palpitations.   Gastrointestinal: Positive for abdominal distention and abdominal pain. Negative for constipation, diarrhea, nausea and vomiting.   Endocrine: Negative.    Genitourinary: Negative for decreased urine volume, difficulty urinating and dysuria.   Musculoskeletal: Negative for arthralgias, back pain, neck pain and neck stiffness.   Skin: Positive for wound.   Allergic/Immunologic: Positive for immunocompromised state.   Neurological: Positive for weakness.  Negative for tremors and headaches.   Psychiatric/Behavioral: Negative for confusion and sleep disturbance.     Objective:     Vital Signs (Most Recent):  Temp: 98.3 °F (36.8 °C) (11/21/18 1215)  Pulse: 84 (11/21/18 1200)  Resp: 13 (11/21/18 1200)  BP: (!) 91/55 (11/21/18 1200)  SpO2: 99 % (11/21/18 1200) Vital Signs (24h Range):  Temp:  [97.5 °F (36.4 °C)-98.3 °F (36.8 °C)] 98.3 °F (36.8 °C)  Pulse:  [] 84  Resp:  [13-18] 13  SpO2:  [95 %-99 %] 99 %  BP: ()/(55-71) 91/55     Weight: 63.2 kg (139 lb 5.3 oz)  Body mass index is 24.68 kg/m².    Intake/Output - Last 3 Shifts       11/19 0700 - 11/20 0659 11/20 0700 - 11/21 0659 11/21 0700 - 11/22 0659    P.O.  920     I.V. (mL/kg) 600 (9.5) 20 (0.3)     Total Intake(mL/kg) 600 (9.5) 940 (14.9)     Urine (mL/kg/hr)  1700 (1.1)     Stool  0     Total Output  1700     Net +600 -760            Stool Occurrence  0 x           Physical Exam   Constitutional: She is oriented to person, place, and time. She appears well-developed. No distress.   Temporal and distal extremity muscle wasting   HENT:   Head: Normocephalic and atraumatic.   Eyes: EOM are normal. No scleral icterus.   Neck: Normal range of motion.   Cardiovascular: Normal rate, regular rhythm and normal heart sounds.   No murmur heard.  Pulmonary/Chest: Effort normal. No respiratory distress. She has no wheezes.   Breath sounds decreased at bases   Abdominal: Soft. Bowel sounds are normal. She exhibits distension. There is tenderness. There is no guarding.   Purulent drainage expressed from middle chevron incision + R lateral side, both areas with packing in place.  Minimal tissue erythema  RLQ VALORIE drain sites c/d/i with sutures   Musculoskeletal: Normal range of motion. She exhibits edema (2+ BLE, + generalized).   Neurological: She is alert and oriented to person, place, and time.   Skin: Skin is warm. She is not diaphoretic.   Psychiatric: She has a normal mood and affect.   Nursing note and vitals  reviewed.      Laboratory:  Immunosuppressants         Stop Route Frequency     tacrolimus capsule 0.5 mg      -- Oral 2 times daily     mycophenolate capsule 500 mg      -- Oral 2 times daily        CBC:   Recent Labs   Lab 11/21/18  0832   WBC 4.81   RBC 2.39*   HGB 7.0*   HCT 22.5*   *   MCV 94   MCH 29.3   MCHC 31.1*     CMP:   Recent Labs   Lab 11/21/18  0639   *   CALCIUM 8.6*   ALBUMIN 3.4*   PROT 4.9*   *   K 3.2*   CO2 27   CL 92*   BUN 75*   CREATININE 1.8*   ALKPHOS 76   ALT 5*   AST 12   BILITOT 1.1*     Labs within the past 24 hours have been reviewed.    Diagnostic Results:  I have personally reviewed all pertinent imaging studies.

## 2018-11-21 NOTE — ASSESSMENT & PLAN NOTE
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   - POD 1 US: increased arterial resistive indices, suggestive of edema vs acute rejection vs congestion.  - Repeat US 10/9 with continued elevation of RIs.  - LFTs stable.  - T bili trending down.   - Repeat liver US (10/11): elevated RIs.  - Liver US 11/6 to assess for ascites. No arterial flow within the liver allograft concerning for interval thrombosis/occlusion of the arterial system. Severe ascites seen.  - Liver US 11/8: arterial flow seen w/ elevated RIs   - LFTs remain stable. No CTA at this time due to EVA and normal liver function.   - Liver US 11/15 showed "abnormally decreased main hepatic artery peak systolic velocity with slow arterial flow and abnormal tardus parvus waveforms intrahepatically."   -Vascular surgery consulted  - Liver US 11/19 showed no arterial flow on the right and sluggish flow on the left.   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 No further intervention planned at this time as patient's LFTs are stable. Will monitor.      "

## 2018-11-21 NOTE — ASSESSMENT & PLAN NOTE
Managed per primary team  Avoid hypoglycemia    Recent Labs   Lab 11/21/18  0639   ALT 5*   AST 12   ALKPHOS 76   BILITOT 1.1*   PROT 4.9*   ALBUMIN 3.4*

## 2018-11-22 PROBLEM — E87.20 METABOLIC ACIDOSIS: Status: RESOLVED | Noted: 2018-10-09 | Resolved: 2018-11-22

## 2018-11-22 LAB
ALBUMIN SERPL BCP-MCNC: 3.3 G/DL
ALP SERPL-CCNC: 73 U/L
ALT SERPL W/O P-5'-P-CCNC: 5 U/L
ANION GAP SERPL CALC-SCNC: 13 MMOL/L
AST SERPL-CCNC: 13 U/L
BACTERIA #/AREA URNS AUTO: ABNORMAL /HPF
BASOPHILS # BLD AUTO: 0.01 K/UL
BASOPHILS NFR BLD: 0.2 %
BILIRUB SERPL-MCNC: 1.3 MG/DL
BILIRUB UR QL STRIP: NEGATIVE
BUN SERPL-MCNC: 69 MG/DL
C DIFF GDH STL QL: NEGATIVE
C DIFF TOX A+B STL QL IA: NEGATIVE
CALCIUM SERPL-MCNC: 8.7 MG/DL
CHLORIDE SERPL-SCNC: 92 MMOL/L
CLARITY UR REFRACT.AUTO: CLEAR
CO2 SERPL-SCNC: 30 MMOL/L
COLOR UR AUTO: YELLOW
CREAT SERPL-MCNC: 1.6 MG/DL
DIFFERENTIAL METHOD: ABNORMAL
EOSINOPHIL # BLD AUTO: 0 K/UL
EOSINOPHIL NFR BLD: 0.4 %
ERYTHROCYTE [DISTWIDTH] IN BLOOD BY AUTOMATED COUNT: 16.8 %
EST. GFR  (AFRICAN AMERICAN): 37.6 ML/MIN/1.73 M^2
EST. GFR  (NON AFRICAN AMERICAN): 32.6 ML/MIN/1.73 M^2
GLUCOSE SERPL-MCNC: 196 MG/DL
GLUCOSE UR QL STRIP: NEGATIVE
HCT VFR BLD AUTO: 27.7 %
HGB BLD-MCNC: 8.6 G/DL
HGB UR QL STRIP: ABNORMAL
IMM GRANULOCYTES # BLD AUTO: 0.07 K/UL
IMM GRANULOCYTES NFR BLD AUTO: 1.3 %
KETONES UR QL STRIP: NEGATIVE
LEUKOCYTE ESTERASE UR QL STRIP: ABNORMAL
LYMPHOCYTES # BLD AUTO: 0.2 K/UL
LYMPHOCYTES NFR BLD: 4.1 %
MAGNESIUM SERPL-MCNC: 1.8 MG/DL
MCH RBC QN AUTO: 28.3 PG
MCHC RBC AUTO-ENTMCNC: 31 G/DL
MCV RBC AUTO: 91 FL
MICROSCOPIC COMMENT: ABNORMAL
MONOCYTES # BLD AUTO: 0.2 K/UL
MONOCYTES NFR BLD: 4.3 %
NEUTROPHILS # BLD AUTO: 4.8 K/UL
NEUTROPHILS NFR BLD: 89.7 %
NITRITE UR QL STRIP: NEGATIVE
NRBC BLD-RTO: 0 /100 WBC
PH UR STRIP: 5 [PH] (ref 5–8)
PLATELET # BLD AUTO: 101 K/UL
PMV BLD AUTO: 9.8 FL
POCT GLUCOSE: 149 MG/DL (ref 70–110)
POCT GLUCOSE: 250 MG/DL (ref 70–110)
POCT GLUCOSE: 268 MG/DL (ref 70–110)
POCT GLUCOSE: 275 MG/DL (ref 70–110)
POTASSIUM SERPL-SCNC: 3.4 MMOL/L
PROT SERPL-MCNC: 4.9 G/DL
PROT UR QL STRIP: NEGATIVE
RBC # BLD AUTO: 3.04 M/UL
RBC #/AREA URNS AUTO: 2 /HPF (ref 0–4)
SODIUM SERPL-SCNC: 135 MMOL/L
SP GR UR STRIP: 1.01 (ref 1–1.03)
SQUAMOUS #/AREA URNS AUTO: 0 /HPF
TACROLIMUS BLD-MCNC: 4 NG/ML
URN SPEC COLLECT METH UR: ABNORMAL
WBC # BLD AUTO: 5.35 K/UL
WBC #/AREA URNS AUTO: 6 /HPF (ref 0–5)

## 2018-11-22 PROCEDURE — 36415 COLL VENOUS BLD VENIPUNCTURE: CPT

## 2018-11-22 PROCEDURE — 25000003 PHARM REV CODE 250: Performed by: PHYSICIAN ASSISTANT

## 2018-11-22 PROCEDURE — 25000003 PHARM REV CODE 250: Performed by: TRANSPLANT SURGERY

## 2018-11-22 PROCEDURE — 87046 STOOL CULTR AEROBIC BACT EA: CPT | Mod: 59

## 2018-11-22 PROCEDURE — 80197 ASSAY OF TACROLIMUS: CPT

## 2018-11-22 PROCEDURE — 81001 URINALYSIS AUTO W/SCOPE: CPT

## 2018-11-22 PROCEDURE — 87077 CULTURE AEROBIC IDENTIFY: CPT

## 2018-11-22 PROCEDURE — 87324 CLOSTRIDIUM AG IA: CPT

## 2018-11-22 PROCEDURE — 63600175 PHARM REV CODE 636 W HCPCS: Performed by: PHYSICIAN ASSISTANT

## 2018-11-22 PROCEDURE — 87086 URINE CULTURE/COLONY COUNT: CPT

## 2018-11-22 PROCEDURE — 94664 DEMO&/EVAL PT USE INHALER: CPT

## 2018-11-22 PROCEDURE — 80053 COMPREHEN METABOLIC PANEL: CPT

## 2018-11-22 PROCEDURE — 83735 ASSAY OF MAGNESIUM: CPT

## 2018-11-22 PROCEDURE — 87045 FECES CULTURE AEROBIC BACT: CPT

## 2018-11-22 PROCEDURE — 85025 COMPLETE CBC W/AUTO DIFF WBC: CPT

## 2018-11-22 PROCEDURE — 87088 URINE BACTERIA CULTURE: CPT

## 2018-11-22 PROCEDURE — 87186 SC STD MICRODIL/AGAR DIL: CPT

## 2018-11-22 PROCEDURE — 87427 SHIGA-LIKE TOXIN AG IA: CPT | Mod: 59

## 2018-11-22 PROCEDURE — 99233 SBSQ HOSP IP/OBS HIGH 50: CPT | Mod: 24,,, | Performed by: PHYSICIAN ASSISTANT

## 2018-11-22 PROCEDURE — 63600175 PHARM REV CODE 636 W HCPCS: Performed by: NURSE PRACTITIONER

## 2018-11-22 PROCEDURE — 20600001 HC STEP DOWN PRIVATE ROOM

## 2018-11-22 PROCEDURE — 25000003 PHARM REV CODE 250: Performed by: NURSE PRACTITIONER

## 2018-11-22 RX ORDER — FUROSEMIDE 40 MG/1
80 TABLET ORAL DAILY
Status: DISCONTINUED | OUTPATIENT
Start: 2018-11-23 | End: 2018-11-25

## 2018-11-22 RX ORDER — HEPARIN SODIUM 5000 [USP'U]/ML
5000 INJECTION, SOLUTION INTRAVENOUS; SUBCUTANEOUS EVERY 8 HOURS
Status: DISCONTINUED | OUTPATIENT
Start: 2018-11-22 | End: 2018-12-06

## 2018-11-22 RX ORDER — POTASSIUM CHLORIDE 750 MG/1
20 CAPSULE, EXTENDED RELEASE ORAL ONCE
Status: DISCONTINUED | OUTPATIENT
Start: 2018-11-22 | End: 2018-11-22

## 2018-11-22 RX ORDER — POTASSIUM CHLORIDE 20 MEQ/15ML
20 SOLUTION ORAL ONCE
Status: COMPLETED | OUTPATIENT
Start: 2018-11-22 | End: 2018-11-22

## 2018-11-22 RX ADMIN — MAGNESIUM OXIDE TAB 400 MG (241.3 MG ELEMENTAL MG) 800 MG: 400 (241.3 MG) TAB at 07:11

## 2018-11-22 RX ADMIN — HEPARIN SODIUM 5000 UNITS: 5000 INJECTION, SOLUTION INTRAVENOUS; SUBCUTANEOUS at 07:11

## 2018-11-22 RX ADMIN — FUROSEMIDE 80 MG: 40 TABLET ORAL at 08:11

## 2018-11-22 RX ADMIN — PANTOPRAZOLE SODIUM 40 MG: 40 TABLET, DELAYED RELEASE ORAL at 08:11

## 2018-11-22 RX ADMIN — INSULIN ASPART 10 UNITS: 100 INJECTION, SOLUTION INTRAVENOUS; SUBCUTANEOUS at 12:11

## 2018-11-22 RX ADMIN — DOCUSATE SODIUM 100 MG: 100 CAPSULE, LIQUID FILLED ORAL at 08:11

## 2018-11-22 RX ADMIN — INSULIN ASPART 2 UNITS: 100 INJECTION, SOLUTION INTRAVENOUS; SUBCUTANEOUS at 08:11

## 2018-11-22 RX ADMIN — ENTECAVIR 0.5 MG: 0.5 TABLET ORAL at 09:11

## 2018-11-22 RX ADMIN — ASPIRIN 81 MG CHEWABLE TABLET 81 MG: 81 TABLET CHEWABLE at 08:11

## 2018-11-22 RX ADMIN — INSULIN ASPART 10 UNITS: 100 INJECTION, SOLUTION INTRAVENOUS; SUBCUTANEOUS at 06:11

## 2018-11-22 RX ADMIN — MIDODRINE HYDROCHLORIDE 15 MG: 5 TABLET ORAL at 02:11

## 2018-11-22 RX ADMIN — INSULIN ASPART 10 UNITS: 100 INJECTION, SOLUTION INTRAVENOUS; SUBCUTANEOUS at 08:11

## 2018-11-22 RX ADMIN — POTASSIUM & SODIUM PHOSPHATES POWDER PACK 280-160-250 MG 2 PACKET: 280-160-250 PACK at 08:11

## 2018-11-22 RX ADMIN — MYCOPHENOLATE MOFETIL 500 MG: 250 CAPSULE ORAL at 07:11

## 2018-11-22 RX ADMIN — PREDNISONE 10 MG: 10 TABLET ORAL at 08:11

## 2018-11-22 RX ADMIN — MAGNESIUM OXIDE TAB 400 MG (241.3 MG ELEMENTAL MG) 800 MG: 400 (241.3 MG) TAB at 08:11

## 2018-11-22 RX ADMIN — POTASSIUM CHLORIDE 20 MEQ: 20 SOLUTION ORAL at 10:11

## 2018-11-22 RX ADMIN — POLYETHYLENE GLYCOL 3350 17 G: 17 POWDER, FOR SOLUTION ORAL at 08:11

## 2018-11-22 RX ADMIN — INSULIN ASPART 3 UNITS: 100 INJECTION, SOLUTION INTRAVENOUS; SUBCUTANEOUS at 06:11

## 2018-11-22 RX ADMIN — TACROLIMUS 0.5 MG: 0.5 CAPSULE ORAL at 08:11

## 2018-11-22 RX ADMIN — MIDODRINE HYDROCHLORIDE 15 MG: 5 TABLET ORAL at 08:11

## 2018-11-22 RX ADMIN — INSULIN ASPART 3 UNITS: 100 INJECTION, SOLUTION INTRAVENOUS; SUBCUTANEOUS at 12:11

## 2018-11-22 RX ADMIN — URSODIOL 300 MG: 300 CAPSULE ORAL at 08:11

## 2018-11-22 RX ADMIN — MYCOPHENOLATE MOFETIL 500 MG: 250 CAPSULE ORAL at 08:11

## 2018-11-22 RX ADMIN — TACROLIMUS 0.5 MG: 0.5 CAPSULE ORAL at 06:11

## 2018-11-22 RX ADMIN — URSODIOL 300 MG: 300 CAPSULE ORAL at 07:11

## 2018-11-22 RX ADMIN — PANTOPRAZOLE SODIUM 40 MG: 40 TABLET, DELAYED RELEASE ORAL at 07:11

## 2018-11-22 RX ADMIN — ATOVAQUONE 1500 MG: 750 SUSPENSION ORAL at 08:11

## 2018-11-22 NOTE — NURSING
Notified GREGORIO Haskins, that pt's stool sample had some consistency and was not liquid, so did not send sample to lab for testing. Pt has also been on stool softeners.  Update: [id number]  SONG Deleon said to send sample down to lab.

## 2018-11-22 NOTE — PROGRESS NOTES
"[facility]  Endocrinology  Progress Note    Admit Date: 10/1/2018     Reason for Consult: Management of type 2 DM, Hyperglycemia      Surgical Procedure and Date: Liver transplant 10/5/18, Ex lap 10/6/18     Diabetes diagnosis year:      Home Diabetes Medications:  Lantus 18 units HS and novolog 17 units with meals plus correction scale (150-200 +1)        Lab Results   Component Value Date     HGBA1C 6.8 (H) 2018        How often checking glucose at home? 3-4   BG readings on regimen: 120-150.  Post prandial readings as high as upper 200s  Hypoglycemia on the regimen? yes, none recently   Missed doses on regimen?  No     Diabetes Complications include:     Diabetic peripheral neuropathy      Complicating diabetes co morbidities:   CIRRHOSIS        HPI:  Patient is a 68 y.o. female with a diagnosis of ESLD, listed for liver transplant with meld 23 who underwent live transplant on 10/5/18.  Back to OR on 10/6/18 for ex lap.  Admitted 10/1/18 for hyponatremia s/p paracentesis.  Personal has known diagnosis of diabetes, endocrine consulted for diabetes management.    Post-operative course complicated by ESBL Klebsiella pneumoniae in urine (in contact isolation), peritransplant ascites, worsening renal function (required temporary dialysis), anemia (multiple blood transfusions), and possible GI bleed.     Interval HPI:   Overnight events: remains in TSU. PRBCs infusing. Paracentesis today. BG at or slightly above goal. Prednisone 10 mg daily; standard steroid taper.   Eatin% - 100%  Nausea: No  Hypoglycemia and intervention: No  Fever: No  TPN and/or TF: No    BP (!) 96/58   Pulse 103   Temp 97.8 °F (36.6 °C) (Oral)   Resp 18   Ht 5' 3" (1.6 m)   Wt 63.2 kg (139 lb 5.3 oz)   LMP  (LMP Unknown)   SpO2 97%   Breastfeeding? No   BMI 24.68 kg/m²      Labs Reviewed and Include    Recent Labs   Lab 18  0639   *   CALCIUM 8.6*   ALBUMIN 3.4*   PROT 4.9*   *   K " 3.2*   CO2 27   CL 92*   BUN 75*   CREATININE 1.8*   ALKPHOS 76   ALT 5*   AST 12   BILITOT 1.1*     Lab Results   Component Value Date    WBC 4.81 11/21/2018    HGB 7.0 (L) 11/21/2018    HCT 22.5 (L) 11/21/2018    MCV 94 11/21/2018     (L) 11/21/2018     No results for input(s): TSH, FREET4 in the last 168 hours.  Lab Results   Component Value Date    HGBA1C 6.0 (H) 10/04/2018       Nutritional status:   Body mass index is 24.68 kg/m².  Lab Results   Component Value Date    ALBUMIN 3.4 (L) 11/21/2018    ALBUMIN 2.5 (L) 11/20/2018    ALBUMIN 2.7 (L) 11/19/2018     Lab Results   Component Value Date    PREALBUMIN 15 (L) 11/03/2018    PREALBUMIN 12 (L) 09/17/2018       Estimated Creatinine Clearance: 26.4 mL/min (A) (based on SCr of 1.8 mg/dL (H)).    Accu-Checks  Recent Labs     11/19/18  0752 11/19/18  1207 11/19/18  1304 11/19/18  1745 11/19/18  2205 11/20/18  0823 11/20/18  1223 11/20/18  1746 11/20/18  2053 11/21/18  0741   POCTGLUCOSE 180* 175* 204* 270* 225* 247* 225* 212* 155* 193*       Current Medications and/or Treatments Impacting Glycemic Control  Immunotherapy:    Immunosuppressants         Stop Route Frequency     tacrolimus capsule 0.5 mg      -- Oral 2 times daily     mycophenolate capsule 500 mg      -- Oral 2 times daily        Steroids:   Hormones (From admission, onward)    Start     Stop Route Frequency Ordered    12/01/18 0900  predniSONE tablet 2.5 mg      12/08 0859 Oral Daily 11/05/18 1004    11/24/18 0900  predniSONE tablet 5 mg      12/01 0859 Oral Daily 11/05/18 1004    11/17/18 0900  predniSONE tablet 10 mg      11/24 0859 Oral Daily 11/05/18 1004    10/17/18 0945  predniSONE tablet 15 mg      10/24 0859 Oral Daily 10/17/18 0943        Pressors:    Autonomic Drugs (From admission, onward)    Start     Stop Route Frequency Ordered    10/10/18 1500  midodrine tablet 15 mg      -- Oral 3 times daily 10/10/18 1122    10/06/18 1617  rocuronium (ZEMURON) 10 mg/mL injection     Comments:   Created by cabinet override    10/07 0429   10/06/18 1617        Hyperglycemia/Diabetes Medications:   Antihyperglycemics (From admission, onward)    Start     Stop Route Frequency Ordered    11/20/18 1645  insulin aspart U-100 pen 5-10 Units      -- SubQ 3 times daily with meals 11/20/18 1541    10/14/18 0925  insulin aspart U-100 pen 0-5 Units      -- SubQ Before meals & nightly PRN 10/14/18 0825    10/07/18 1200  insulin regular (Humulin R) 100 Units in sodium chloride 0.9% 100 mL infusion      10/13 2100 IV Continuous 10/07/18 1055          ASSESSMENT and PLAN    * Liver transplanted    Managed per primary team  Avoid hypoglycemia    Recent Labs   Lab 11/21/18  0639   ALT 5*   AST 12   ALKPHOS 76   BILITOT 1.1*   PROT 4.9*   ALBUMIN 3.4*            Type 2 diabetes mellitus with diabetic polyneuropathy, with long-term current use of insulin    BG goal 140-180.       Low dose correction scale given kidney function.  BG monitoring AC/HS   novolog 5 units with meals if BG <120 (or call endocrine) or 10 units with meals if >120.     Start self/caregiver administration of insulin to review for home.     Discharge planning:  TBD     EVA (acute kidney injury)    Avoid insulin stacking and hypoglycemia.  Lab Results   Component Value Date    CREATININE 1.8 (H) 11/21/2018        Prophylactic immunotherapy    May increase insulin resistance.        Severe malnutrition    Oral intake encouraged, may affect BG readings         Aviva Caldera NP  Endocrinology  [facility]

## 2018-11-22 NOTE — PLAN OF CARE
Problem: Patient Care Overview  Goal: Plan of Care Review  Outcome: Ongoing (interventions implemented as appropriate)  Pt AAOx4, VSS, in bed with upper siderails raised x2, bed in lowest/locked position, call light/personal belongings within reach. Pt instructed to call for assistance. Pt verbalizes understanding. Pt afebrile at this time.  Proper hand hygiene performed before and after pt care.      R and midline sites on Chevron incision remain packed, only outside dressing changed by RN per orders. Leaking moderate amount of serous fluid.  Contact precautions maintained for Klebsiella in urine cx.  Blood glucose monitored ACHS.   1u PRBC administered (11/21) for low H&H, will recheck H&H with AM labs (11/22).   Para (11/21), 4L off.  LFTs trending down.   Cr elevated at 1.8.  Pt compliant with 1500L fluid restriction. Recording strict I&O's.   Midodrine held per order parameters, SBP > 120.  Will relay to day RN that pt's new caregiver needs education on self-meds. Will likely need . Pt/pt's caregiver not pulling any self meds.   UOP measured via urinal.    at bedside attentive to pt's needs.    Will continue to monitor patient.

## 2018-11-22 NOTE — PROGRESS NOTES
[facility]  Liver Transplant  Progress Note    Patient Name: Scarlett Reddy  MRN: [id number]  Admission Date: 10/1/2018  Hospital Length of Stay: 51 days  Code Status: Full Code  Primary Care Provider: Primary Doctor No  Post-Operative Day: 48    ORGAN:   LIVER  Disease Etiology: Cirrhosis: Type B and D  Donor Type:    - Brain Death  CDC High Risk:   No  Donor CMV Status:   Donor CMV Status: Positive  Donor HBcAB:   Negative  Donor HCV Status:   Negative  Donor HBV SETH: Negative  Donor HCV SETH: Negative  Whole or Partial: Whole Liver  Biliary Anastomosis: End to End  Arterial Anatomy: Replaced Left Hepatic and Right Hepatic  Subjective:     History of Present Illness:  Ms. Reddy is a 67 y/o female with PMH of ESLD secondary Hep B and D.  Listed for liver transplant with MELD 23.  Paracentesis 10/1 with 5L removed, no fluid sent for analysis.  Morning labs significant for hyponatremia, Na 119.  Pt admitted to LTS for sodium monitoring.        Hospital Course:  Hospital Course: 67 y/o F h/o HBV/delta cirrhosis s/p DBDLT 10/5/18 (CMV D+/R+, steroid induction, MMF/tacro/pred maintenance) with take back on 10/6 2/2 hyperbilirubinema and bilious VALORIE drainage, no leak identified. Post-op course c/b hypercapneic and hypoxic respiratory failure and was reintubated though quickly extubated now doing well. Liver bx (10/6) sig for Zone 3 hemorrhage and collapse, in addition to cholestasis. Transferred from ICU on POD #4. ID consulted to comment on positive diphtheroid culture from ascitic fluid (likely skin colonization) as well as ESBL Klebsiella pneumoniae in urine culture (10/4).  Pt asymptomatic and cell count from ascitic fluid unremarkable. Per ID recs, no need to treat diphtheroids or asymptomatic ESBL Kleb pneumo bacteriuria though pt has had 6 days of therapy which would cover these organisms. Mild peritransplant ascites- repeat US 10/6 with increased arterial resistive indices. Repeat US 10/9  w/ continued elevation in RIs and fluid collections. LFTs trending down nicely.     Acute gradual worsening of renal function s/p OLTx. Creatinine on arrival 0.8 and has been up trending. Nephrology consulted for post-op EVA. Cr cont to trend up but remains to have good UOP. Had stable response to lasiz diuresis.  BUN elevated requiring multiple rounds of dialysis (10/15, 10/17, 10/20, 10/31). No significant improvements in kidney function noted.   In addition, pt H/H cont to fluctuate requiring multiple blood transfusions (10/14, 10/16, 10/18, 10/19, 10/22, 10/27, 10/29).  Pt reported dark stools 10/15 but color has normalized. FOBT+. EGD 10/18 consistent with ulcerated mucosa in duodenum s/p coagulation. Concern for H. Pylori. Started Amoxicillin/levofloxacin (10/19, complete 11/2)). Remains on Protonix 40 mg BID. Biopsy negative for infection and CMV. Will cont close monitoring H/H, transfuse as needed with goal Hb > 7.0. Of note, Urine cx + Klebsiella pneumoniae (10/13). Received 3 day course of ciprofloxacin, dc'd 10/19.   On 10/19 pm pt c/o crushing chest pain. Troponin 0.094, likely 2/2 ischemic stress. EKG NSR.   Liver US 11/6 showed no arterial flow. Paracentesis 11/7 with 8.9 L removed. Wbc 39, segs 16%. Gram stain no organisms seen + no WBC. Pt feels better since having fluid removed but she continues to have worsening abdominal distention & LE edema.Repeat Liver US 11/8 showed arterial flow with increased RIs. Paracentesis 11/12 with 8.6L removed (WBC 34, segs 25%). 11/15 liver u/s showed abnormally decreased main hepatic artery peak systolic velocity with slow arterial flow and abnormal tardus parvus waveforms intrahepatically. Consulted vascular surgery. Repeat liver US 11/19 showed no arterial flow on R and sluggish flow on L. Hepatic angiogram 11/19 confirmed HAT.  Friday 11/16, purulent drainage noted from middle of chevron incision. Area opened and packed. Cx grew klebsiella p. CT scan showed no  signs of infection. On Monday 11/18, it was noted that the right/lateral side of the chevron had 3 small openings draining purulent fluid. Opened the area and packed. Wound care following.    Interval history: No acute events overnight. Patient doing okay this morning. Para with 4L off yesterday 11/21, cell count neg for infection. Creatinine continuing to improve. Bicarb slightly elevated. Plan to decrease lasix to 80 mg daily from BID. Is complaining of lower back pain, will send UA and cx. Also complaining of diarrhea, check stool for infeciton. Continue to monitor.     Scheduled Meds:   aspirin  81 mg Oral Daily    atovaquone  1,500 mg Oral Daily    bisacodyl  10 mg Oral QHS    docusate sodium  100 mg Oral Daily    entecavir  0.5 mg Oral Every other day    furosemide  80 mg Oral BID    insulin aspart U-100  5-10 Units Subcutaneous TIDWM    magnesium oxide  800 mg Oral BID    midodrine  15 mg Oral TID    mycophenolate  500 mg Oral BID    pantoprazole  40 mg Oral BID    polyethylene glycol  17 g Oral Daily    potassium chloride  20 mEq Oral Once    predniSONE  10 mg Oral Daily    Followed by    [START ON 11/24/2018] predniSONE  5 mg Oral Daily    Followed by    [START ON 12/1/2018] predniSONE  2.5 mg Oral Daily    tacrolimus  0.5 mg Oral BID    ursodiol  300 mg Oral BID     Continuous Infusions:  PRN Meds:sodium chloride, acetaminophen, bisacodyl, calcium carbonate, dextrose 50%, dextrose 50%, glucagon (human recombinant), glucose, glucose, insulin aspart U-100, ondansetron, ondansetron, polyethylene glycol, sodium chloride 0.9%, traMADol    Review of Systems   Constitutional: Positive for activity change, appetite change (improving) and fatigue. Negative for chills and fever.   HENT: Negative for congestion, facial swelling and trouble swallowing.    Eyes: Negative for pain, discharge and visual disturbance.   Respiratory: Negative for cough, chest tightness, shortness of breath and wheezing.     Cardiovascular: Positive for leg swelling. Negative for chest pain and palpitations.   Gastrointestinal: Positive for abdominal distention and abdominal pain. Negative for constipation, diarrhea, nausea and vomiting.   Endocrine: Negative.    Genitourinary: Negative for decreased urine volume, difficulty urinating and dysuria.   Musculoskeletal: Negative for arthralgias, back pain, neck pain and neck stiffness.   Skin: Positive for wound.   Allergic/Immunologic: Positive for immunocompromised state.   Neurological: Positive for weakness. Negative for tremors and headaches.   Psychiatric/Behavioral: Negative for confusion and sleep disturbance.     Objective:     Vital Signs (Most Recent):  Temp: 98.6 °F (37 °C) (11/22/18 0805)  Pulse: 89 (11/22/18 0805)  Resp: 14 (11/22/18 0805)  BP: 108/62 (11/22/18 0805)  SpO2: 98 % (11/22/18 0805) Vital Signs (24h Range):  Temp:  [97.5 °F (36.4 °C)-99 °F (37.2 °C)] 98.6 °F (37 °C)  Pulse:  [] 89  Resp:  [11-20] 14  SpO2:  [95 %-99 %] 98 %  BP: ()/(55-68) 108/62     Weight: 64.8 kg (142 lb 13.7 oz)  Body mass index is 25.31 kg/m².    Intake/Output - Last 3 Shifts       11/20 0700 - 11/21 0659 11/21 0700 - 11/22 0659 11/22 0700 - 11/23 0659    P.O. 920 300     I.V. (mL/kg) 20 (0.3)      Blood  192.5     Total Intake(mL/kg) 940 (14.9) 492.5 (7.6)     Urine (mL/kg/hr) 1700 (1.1)      Other  4000     Stool 0      Total Output 1700 4000     Net -760 -3507.5            Urine Occurrence  2 x     Stool Occurrence 0 x 1 x           Physical Exam   Constitutional: She is oriented to person, place, and time. She appears well-developed. No distress.   Temporal and distal extremity muscle wasting   HENT:   Head: Normocephalic and atraumatic.   Eyes: EOM are normal. No scleral icterus.   Neck: Normal range of motion.   Cardiovascular: Normal rate, regular rhythm and normal heart sounds.   No murmur heard.  Pulmonary/Chest: Effort normal. No respiratory distress. She has no  wheezes.   Breath sounds decreased at bases   Abdominal: Soft. Bowel sounds are normal. She exhibits distension. There is tenderness. There is no guarding.   Purulent drainage expressed from middle chevron incision + R lateral side, both areas with packing in place.  Minimal tissue erythema  RLQ VALORIE drain sites c/d/i with sutures   Musculoskeletal: Normal range of motion. She exhibits edema (2+ BLE, + generalized).   Neurological: She is alert and oriented to person, place, and time.   Skin: Skin is warm. She is not diaphoretic.   Psychiatric: She has a normal mood and affect.   Nursing note and vitals reviewed.      Laboratory:  Immunosuppressants         Stop Route Frequency     tacrolimus capsule 0.5 mg      -- Oral 2 times daily     mycophenolate capsule 500 mg      -- Oral 2 times daily        CBC:   Recent Labs   Lab 11/21/18  0832   WBC 4.81   RBC 2.39*   HGB 7.0*   HCT 22.5*   *   MCV 94   MCH 29.3   MCHC 31.1*     CMP:   Recent Labs   Lab 11/22/18  0619   *   CALCIUM 8.7   ALBUMIN 3.3*   PROT 4.9*   *   K 3.4*   CO2 30*   CL 92*   BUN 69*   CREATININE 1.6*   ALKPHOS 73   ALT 5*   AST 13   BILITOT 1.3*     Labs within the past 24 hours have been reviewed.    Diagnostic Results:  I have personally reviewed all pertinent imaging studies.    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   - POD 1 US: increased arterial resistive indices, suggestive of edema vs acute rejection vs congestion.  - Repeat US 10/9 with continued elevation of RIs.  - LFTs stable.  - T bili trending down.   - Repeat liver US (10/11): elevated RIs.  - Liver US 11/6 to assess for ascites. No arterial flow within the liver allograft concerning for interval thrombosis/occlusion of the arterial system. Severe ascites seen.  - Liver US 11/8: arterial flow  "seen w/ elevated RIs   - LFTs remain stable. No CTA at this time due to EVA and normal liver function.   - Liver US 11/15 showed "abnormally decreased main hepatic artery peak systolic velocity with slow arterial flow and abnormal tardus parvus waveforms intrahepatically."   -Vascular surgery consulted  - Liver US 11/19 showed no arterial flow on the right and sluggish flow on the left.   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 No further intervention planned at this time as patient's LFTs are stable. Will monitor.         Hypokalemia    - K-lyte 50 mg qd given 11/20 & 11/21.  - Continue to monitor & replace as needed.     Hypophosphatemia    - Phos 2.0 11/21 - Kphos 500 mg x 2 doses  - Continue to monitor & replace as needed.       Delayed surgical wound healing    - Middle chevron incision opened 11/16 with purulent drainage  - Packed with Aquacel ag at this time. Growing Klebsiella p.   - CT abd/pelvis 11/17- no signs of infection  - 11/17: R side of chevron incision w/ 3 small holes draining purulent fluid, opened and packed w/ aquacel.   - Wound care following. Appreciate recs.          Hepatic artery thrombosis of transplanted liver    - Liver US 11/19 showed no arterial flow to R and sluggish flow to L  - Hepatic angiogram 11/19 confirmed HAT  - No further intervention planned at this time. Will continue to monitor.     Thrombocytopenia, unspecified    - Improving.  - Monitor.       Alteration in skin integrity    Monitor.       Leukopenia    - Stopped Cellcept, Bactrim, and Valcyte 10/31 2/2 leukopenia  - Started Atovaquone 11/6.   - CMV PCR noted to be positive at 129. Will hold off on treatment with valcyte at this time.  - Continue to monitor.       Azotemia    - CRRT 10/31 for uremic toxin clearance.           UTI (urinary tract infection)    - Urine Cx 10/15 w/ Klebsiella pneumonia.  - Started Ciprofloxacin (10/17-10/19).   - Started on amoxicillin/levo for H. Pylori (completed abx " "11/2).  - Pt asymptomatic at this time.       Acute blood loss anemia    - H&H cont to fluctuate.   - FOBT +.  - EGD 10/18 - showed diffuse bleeding with ulcerated duodenal mucosa.  - EGD 11/5 - improvement seen, no active bleeding.  - Resume heparin sq injections 11/6.   - Decrease heparin sq from TID to BID 11/15   - Heparin dc'd 11/17  - See "anemia requiring transfusions."  - Continue to monitor.             Post LT - Acute renal failure    - Creatinine on arrival 0.8 and trended up.  - Nephrology consulted.  - Renal US (10/11): no hydronephrosis.  - Echo 10/12: diastolic dysfunction, likely 2/2 to ARF.  - CRRT 10/14; SLED 10/17, 10/20. --> no improvements noted in kidney fxn.  - 24 hr urine creatine collection: CrCl of 7.   - CRRT 10/31 with 1L removed.   - See "hypervolemia associated with renal insufficiency"     Hypervolemia associated with renal insufficiency    - CRRT 10/14, 10/17, 10/20, 10/31.  - CXR 10/30: abundant fluid in b/l pleural space.  - Continue 80 mg Lasix BID.  - Monitor daily weights.       EVA (acute kidney injury)    - No plans for RRT in near future, per nephrology  - Trialysis line removed 11/13  - Nephrology involved. Appreciate recs.  - Renal fx showing improvement   - Monitor.     At risk for opportunistic infections    - Valcyte for CMV prophylaxis--> holding (10/31) for leukopenia. Pt will need weekly CMV PCR while discontinued.  - CMV PCR noted to be positive at 129. .  - CMV PCR 11/8: 105  - CMV PCR 11/15 with 89 copies  - Next CMV PCR due 11/22  - Bactrim for PCP prophylaxis (MWF).--> holding (10/31) for leukopenia.   - Start Atovaquone 11/6.  - Fluconazole for fungal prophylaxis (first dose 10/6). D/c Fluc 11/16 bc pt had been on it for > 30 days post take back.       Long-term use of immunosuppressant medication    - Prograf: stopped 10/29. Resumed 11/2.  --> Will monitor for signs of toxic side effects, check daily troughs, and change meds accordingly.   - Prednisone: stopped " 10/29. Started on Hydrocortisone. pred taper resumed.  - Cellcept: stopped 10/31 for leukopenia. Restarted Cellcept 500 mg bid, 11/6.         Prophylactic immunotherapy    - See long term use immunosuppresion.       Ascites    - Paracentesis 10/1, 5L removed, no fluid sent for analysis.  - Pt remains volume overloaded.   - Liver US 11/6 with severe ascites.   - Paracentesis 11/7 with 8.9 L removed. Wbc 39, segs 16%. Gram stain - no organisms seen and no WBC. Cultures NGTD.  - Paracentesis 11/12: 8.6 L removed. 34 wbc, 25% segs, 33 lymphs, 42 monocytes/macrophages. Cultures NGTD  - Paracentesis 11/21 with 4 L removed, wbc neg for infection.     Weakness    - See physical deconditioning.       Physical deconditioning    - PT/OT consulted.  - Recommend home health PT and rolling walker at discharge (orders placed 11/6).       Hyponatremia    - Na 119 on admit.  - Na was improving post transplant w HD.  - Na stable at this time.             Hypotension    - continue Midodrine 15 mg TID. Monitor       Anemia requiring transfusions    - Fluctuating H&H.    - Transfusions: 10/14; 10/16; 10/18; 10/19; 10/22; 10/27; 10/29, 10/31, 11/3, 11/21  - FOBT +  - LDH elevated, Hapto < 10.  - EGD 10/18: ulcerated duodenal mucosa.   - Consulted GI about this ongoing issue and further need of another EGD.  - EGD 11/5 unremarkable.   - Iron studies 11/13:  95, TIBC 107, sat iron 89, transferrin 72, ferritin pending  - Hemolytic anemia workup 11/13: Haptoglobin 132, retic 3.9   - Transfused 1upRBC 11/21. H/H now improved.   - Will continue to monitor closely.           Severe malnutrition    - Dietary consulted.   - Temporal and distal extremity muscle wasting and edema.  - Encourage supplements and to increase nutritional intake.  - Per nephrology, pt to be on low protein diet.   - Prealbumin reviewed.      Type 2 diabetes mellitus with diabetic polyneuropathy, with long-term current use of insulin    - Continue home regimen.  -  Endocrine consulted.  - Pt off insulin drip at this time.  Apprec endo recs.        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  - Tx w/ Entecavir- dose changed to q72 hours given renal function.             VTE Risk Mitigation (From admission, onward)        Ordered     heparin (porcine) injection 5,000 Units  Every 8 hours      11/22/18 1014     IP VTE HIGH RISK PATIENT  Once      11/05/18 1145     Place sequential compression device  Until discontinued      10/05/18 0709          The patients clinical status was discussed at multidisplinary rounds, involving transplant surgery, transplant medicine, pharmacy, nursing, nutrition, and social work    Discharge Planning: Not a candidate for discharge at this time.   No Patient Care Coordination Note on file.      Meryl Deleon PA-C  Liver Transplant  [facility]

## 2018-11-22 NOTE — PROGRESS NOTES
IV  (). RN attempted new IV start x2, unsuccessful d/t poor vein access. PICC consult placed.     Pt refused standing weight.     Will continue to monitor patient

## 2018-11-22 NOTE — ASSESSMENT & PLAN NOTE
Managed per primary team  Avoid hypoglycemia    Recent Labs   Lab 11/22/18  0619   ALT 5*   AST 13   ALKPHOS 73   BILITOT 1.3*   PROT 4.9*   ALBUMIN 3.3*

## 2018-11-22 NOTE — CARE UPDATE
BG goal 140-180. BG remains variable with scheduled insulin; requiring correction scale.       Low dose correction scale given kidney function.  BG monitoring AC/HS  novolog 5 units with meals if BG <120 (or call endocrine) or 10 units with meals if >120.       ** Please call Endocrine for any BG related issues **

## 2018-11-22 NOTE — SUBJECTIVE & OBJECTIVE
Scheduled Meds:   aspirin  81 mg Oral Daily    atovaquone  1,500 mg Oral Daily    bisacodyl  10 mg Oral QHS    docusate sodium  100 mg Oral Daily    entecavir  0.5 mg Oral Every other day    furosemide  80 mg Oral BID    insulin aspart U-100  5-10 Units Subcutaneous TIDWM    magnesium oxide  800 mg Oral BID    midodrine  15 mg Oral TID    mycophenolate  500 mg Oral BID    pantoprazole  40 mg Oral BID    polyethylene glycol  17 g Oral Daily    potassium chloride  20 mEq Oral Once    predniSONE  10 mg Oral Daily    Followed by    [START ON 11/24/2018] predniSONE  5 mg Oral Daily    Followed by    [START ON 12/1/2018] predniSONE  2.5 mg Oral Daily    tacrolimus  0.5 mg Oral BID    ursodiol  300 mg Oral BID     Continuous Infusions:  PRN Meds:sodium chloride, acetaminophen, bisacodyl, calcium carbonate, dextrose 50%, dextrose 50%, glucagon (human recombinant), glucose, glucose, insulin aspart U-100, ondansetron, ondansetron, polyethylene glycol, sodium chloride 0.9%, traMADol    Review of Systems   Constitutional: Positive for activity change, appetite change (improving) and fatigue. Negative for chills and fever.   HENT: Negative for congestion, facial swelling and trouble swallowing.    Eyes: Negative for pain, discharge and visual disturbance.   Respiratory: Negative for cough, chest tightness, shortness of breath and wheezing.    Cardiovascular: Positive for leg swelling. Negative for chest pain and palpitations.   Gastrointestinal: Positive for abdominal distention and abdominal pain. Negative for constipation, diarrhea, nausea and vomiting.   Endocrine: Negative.    Genitourinary: Negative for decreased urine volume, difficulty urinating and dysuria.   Musculoskeletal: Negative for arthralgias, back pain, neck pain and neck stiffness.   Skin: Positive for wound.   Allergic/Immunologic: Positive for immunocompromised state.   Neurological: Positive for weakness. Negative for tremors and headaches.    Psychiatric/Behavioral: Negative for confusion and sleep disturbance.     Objective:     Vital Signs (Most Recent):  Temp: 98.6 °F (37 °C) (11/22/18 0805)  Pulse: 89 (11/22/18 0805)  Resp: 14 (11/22/18 0805)  BP: 108/62 (11/22/18 0805)  SpO2: 98 % (11/22/18 0805) Vital Signs (24h Range):  Temp:  [97.5 °F (36.4 °C)-99 °F (37.2 °C)] 98.6 °F (37 °C)  Pulse:  [] 89  Resp:  [11-20] 14  SpO2:  [95 %-99 %] 98 %  BP: ()/(55-68) 108/62     Weight: 64.8 kg (142 lb 13.7 oz)  Body mass index is 25.31 kg/m².    Intake/Output - Last 3 Shifts       11/20 0700 - 11/21 0659 11/21 0700 - 11/22 0659 11/22 0700 - 11/23 0659    P.O. 920 300     I.V. (mL/kg) 20 (0.3)      Blood  192.5     Total Intake(mL/kg) 940 (14.9) 492.5 (7.6)     Urine (mL/kg/hr) 1700 (1.1)      Other  4000     Stool 0      Total Output 1700 4000     Net -760 -3507.5            Urine Occurrence  2 x     Stool Occurrence 0 x 1 x           Physical Exam   Constitutional: She is oriented to person, place, and time. She appears well-developed. No distress.   Temporal and distal extremity muscle wasting   HENT:   Head: Normocephalic and atraumatic.   Eyes: EOM are normal. No scleral icterus.   Neck: Normal range of motion.   Cardiovascular: Normal rate, regular rhythm and normal heart sounds.   No murmur heard.  Pulmonary/Chest: Effort normal. No respiratory distress. She has no wheezes.   Breath sounds decreased at bases   Abdominal: Soft. Bowel sounds are normal. She exhibits distension. There is tenderness. There is no guarding.   Purulent drainage expressed from middle chevron incision + R lateral side, both areas with packing in place.  Minimal tissue erythema  RLQ VALORIE drain sites c/d/i with sutures   Musculoskeletal: Normal range of motion. She exhibits edema (2+ BLE, + generalized).   Neurological: She is alert and oriented to person, place, and time.   Skin: Skin is warm. She is not diaphoretic.   Psychiatric: She has a normal mood and affect.    Nursing note and vitals reviewed.      Laboratory:  Immunosuppressants         Stop Route Frequency     tacrolimus capsule 0.5 mg      -- Oral 2 times daily     mycophenolate capsule 500 mg      -- Oral 2 times daily        CBC:   Recent Labs   Lab 11/21/18  0832   WBC 4.81   RBC 2.39*   HGB 7.0*   HCT 22.5*   *   MCV 94   MCH 29.3   MCHC 31.1*     CMP:   Recent Labs   Lab 11/22/18  0619   *   CALCIUM 8.7   ALBUMIN 3.3*   PROT 4.9*   *   K 3.4*   CO2 30*   CL 92*   BUN 69*   CREATININE 1.6*   ALKPHOS 73   ALT 5*   AST 13   BILITOT 1.3*     Labs within the past 24 hours have been reviewed.    Diagnostic Results:  I have personally reviewed all pertinent imaging studies.

## 2018-11-22 NOTE — ASSESSMENT & PLAN NOTE
- Fluctuating H&H.    - Transfusions: 10/14; 10/16; 10/18; 10/19; 10/22; 10/27; 10/29, 10/31, 11/3, 11/21  - FOBT +  - LDH elevated, Hapto < 10.  - EGD 10/18: ulcerated duodenal mucosa.   - Consulted GI about this ongoing issue and further need of another EGD.  - EGD 11/5 unremarkable.   - Iron studies 11/13:  95, TIBC 107, sat iron 89, transferrin 72, ferritin pending  - Hemolytic anemia workup 11/13: Haptoglobin 132, retic 3.9   - Transfused 1upRBC 11/21. H/H now improved.   - Will continue to monitor closely.

## 2018-11-22 NOTE — NURSING
Asked GREGORIO Haskins, about switching Potassium chloride capsule to liquid form since pt has trouble swallowing big pills.

## 2018-11-22 NOTE — ASSESSMENT & PLAN NOTE
Avoid insulin stacking and hypoglycemia.  Lab Results   Component Value Date    CREATININE 1.6 (H) 11/22/2018

## 2018-11-22 NOTE — PLAN OF CARE
Problem: Patient Care Overview  Goal: Plan of Care Review  Assumed care of patient's at 1430, in agreement with ELIUD Bernal RN assessment.   - Patient is AAOx4, ambulates with standby assistance and a walker. Patient's  at bedside attentive to patient's needs. Patient educated to use call bell for assistance, verbalized understanding.   - Afebrile, WBC 5.35 - UA/UC collected for complaints of back pain   - Special contact isolation for CDIFF - stool sent for analysis   - Mid chevron incision packed with aquacel   - Accuchecks AC/HS - mealtime and SSI administered per order   - See flow sheet for complete assessment details

## 2018-11-22 NOTE — ASSESSMENT & PLAN NOTE
- Paracentesis 10/1, 5L removed, no fluid sent for analysis.  - Pt remains volume overloaded.   - Liver US 11/6 with severe ascites.   - Paracentesis 11/7 with 8.9 L removed. Wbc 39, segs 16%. Gram stain - no organisms seen and no WBC. Cultures NGTD.  - Paracentesis 11/12: 8.6 L removed. 34 wbc, 25% segs, 33 lymphs, 42 monocytes/macrophages. Cultures NGTD  - Paracentesis 11/21 with 4 L removed, wbc neg for infection.

## 2018-11-22 NOTE — ASSESSMENT & PLAN NOTE
BG goal 140-180.       Low dose correction scale given kidney function.  BG monitoring AC/HS   novolog 6 units with meals if BG <120 (or call endocrine) or 12 units with meals if >120.     Start self/caregiver administration of insulin to review for home.     Discharge planning:  TBD

## 2018-11-23 PROBLEM — R23.9 ALTERATION IN SKIN INTEGRITY DUE TO MOISTURE: Status: ACTIVE | Noted: 2018-11-23

## 2018-11-23 LAB
ALBUMIN SERPL BCP-MCNC: 3 G/DL
ALP SERPL-CCNC: 83 U/L
ALT SERPL W/O P-5'-P-CCNC: 5 U/L
ANION GAP SERPL CALC-SCNC: 13 MMOL/L
AST SERPL-CCNC: 12 U/L
BASOPHILS # BLD AUTO: 0.02 K/UL
BASOPHILS NFR BLD: 0.5 %
BILIRUB SERPL-MCNC: 1.3 MG/DL
BUN SERPL-MCNC: 63 MG/DL
CALCIUM SERPL-MCNC: 8.6 MG/DL
CHLORIDE SERPL-SCNC: 93 MMOL/L
CO2 SERPL-SCNC: 29 MMOL/L
CREAT SERPL-MCNC: 1.4 MG/DL
DIFFERENTIAL METHOD: ABNORMAL
E COLI SXT1 STL QL IA: NEGATIVE
E COLI SXT2 STL QL IA: NEGATIVE
EOSINOPHIL # BLD AUTO: 0 K/UL
EOSINOPHIL NFR BLD: 0.5 %
ERYTHROCYTE [DISTWIDTH] IN BLOOD BY AUTOMATED COUNT: 16.8 %
EST. GFR  (AFRICAN AMERICAN): 44.2 ML/MIN/1.73 M^2
EST. GFR  (NON AFRICAN AMERICAN): 38.4 ML/MIN/1.73 M^2
GLUCOSE SERPL-MCNC: 202 MG/DL
HCT VFR BLD AUTO: 27 %
HGB BLD-MCNC: 8.4 G/DL
IMM GRANULOCYTES # BLD AUTO: 0.07 K/UL
IMM GRANULOCYTES NFR BLD AUTO: 1.8 %
LYMPHOCYTES # BLD AUTO: 0.2 K/UL
LYMPHOCYTES NFR BLD: 4.6 %
MAGNESIUM SERPL-MCNC: 1.7 MG/DL
MCH RBC QN AUTO: 28.6 PG
MCHC RBC AUTO-ENTMCNC: 31.1 G/DL
MCV RBC AUTO: 92 FL
MONOCYTES # BLD AUTO: 0.3 K/UL
MONOCYTES NFR BLD: 6.7 %
NEUTROPHILS # BLD AUTO: 3.3 K/UL
NEUTROPHILS NFR BLD: 85.9 %
NRBC BLD-RTO: 0 /100 WBC
PLATELET # BLD AUTO: 88 K/UL
PMV BLD AUTO: 9.7 FL
POCT GLUCOSE: 111 MG/DL (ref 70–110)
POCT GLUCOSE: 154 MG/DL (ref 70–110)
POCT GLUCOSE: 189 MG/DL (ref 70–110)
POCT GLUCOSE: 223 MG/DL (ref 70–110)
POCT GLUCOSE: 244 MG/DL (ref 70–110)
POTASSIUM SERPL-SCNC: 3.9 MMOL/L
PROT SERPL-MCNC: 4.9 G/DL
RBC # BLD AUTO: 2.94 M/UL
SODIUM SERPL-SCNC: 135 MMOL/L
TACROLIMUS BLD-MCNC: 3.9 NG/ML
WBC # BLD AUTO: 3.88 K/UL

## 2018-11-23 PROCEDURE — 99232 SBSQ HOSP IP/OBS MODERATE 35: CPT | Mod: ,,, | Performed by: NURSE PRACTITIONER

## 2018-11-23 PROCEDURE — 80197 ASSAY OF TACROLIMUS: CPT

## 2018-11-23 PROCEDURE — 25000003 PHARM REV CODE 250: Performed by: PHYSICIAN ASSISTANT

## 2018-11-23 PROCEDURE — 63600175 PHARM REV CODE 636 W HCPCS: Performed by: NURSE PRACTITIONER

## 2018-11-23 PROCEDURE — 99233 SBSQ HOSP IP/OBS HIGH 50: CPT | Mod: 24,,, | Performed by: PHYSICIAN ASSISTANT

## 2018-11-23 PROCEDURE — 36415 COLL VENOUS BLD VENIPUNCTURE: CPT

## 2018-11-23 PROCEDURE — 83735 ASSAY OF MAGNESIUM: CPT

## 2018-11-23 PROCEDURE — 99900035 HC TECH TIME PER 15 MIN (STAT)

## 2018-11-23 PROCEDURE — 27000646 HC AEROBIKA DEVICE

## 2018-11-23 PROCEDURE — 63600175 PHARM REV CODE 636 W HCPCS: Performed by: PHYSICIAN ASSISTANT

## 2018-11-23 PROCEDURE — 94799 UNLISTED PULMONARY SVC/PX: CPT

## 2018-11-23 PROCEDURE — 25000003 PHARM REV CODE 250: Performed by: NURSE PRACTITIONER

## 2018-11-23 PROCEDURE — 85025 COMPLETE CBC W/AUTO DIFF WBC: CPT

## 2018-11-23 PROCEDURE — 25000003 PHARM REV CODE 250: Performed by: TRANSPLANT SURGERY

## 2018-11-23 PROCEDURE — 80053 COMPREHEN METABOLIC PANEL: CPT

## 2018-11-23 PROCEDURE — 20600001 HC STEP DOWN PRIVATE ROOM

## 2018-11-23 PROCEDURE — 94664 DEMO&/EVAL PT USE INHALER: CPT

## 2018-11-23 RX ORDER — INSULIN ASPART 100 [IU]/ML
6-12 INJECTION, SOLUTION INTRAVENOUS; SUBCUTANEOUS
Status: DISCONTINUED | OUTPATIENT
Start: 2018-11-23 | End: 2018-11-25

## 2018-11-23 RX ORDER — DIPHENHYDRAMINE HYDROCHLORIDE 50 MG/ML
12.5 INJECTION INTRAMUSCULAR; INTRAVENOUS ONCE
Status: COMPLETED | OUTPATIENT
Start: 2018-11-23 | End: 2018-11-23

## 2018-11-23 RX ADMIN — PANTOPRAZOLE SODIUM 40 MG: 40 TABLET, DELAYED RELEASE ORAL at 08:11

## 2018-11-23 RX ADMIN — MAGNESIUM OXIDE TAB 400 MG (241.3 MG ELEMENTAL MG) 800 MG: 400 (241.3 MG) TAB at 08:11

## 2018-11-23 RX ADMIN — MYCOPHENOLATE MOFETIL 500 MG: 250 CAPSULE ORAL at 08:11

## 2018-11-23 RX ADMIN — DIPHENHYDRAMINE HYDROCHLORIDE 12.5 MG: 50 INJECTION, SOLUTION INTRAMUSCULAR; INTRAVENOUS at 04:11

## 2018-11-23 RX ADMIN — URSODIOL 300 MG: 300 CAPSULE ORAL at 08:11

## 2018-11-23 RX ADMIN — INSULIN ASPART 2 UNITS: 100 INJECTION, SOLUTION INTRAVENOUS; SUBCUTANEOUS at 01:11

## 2018-11-23 RX ADMIN — INSULIN ASPART 12 UNITS: 100 INJECTION, SOLUTION INTRAVENOUS; SUBCUTANEOUS at 01:11

## 2018-11-23 RX ADMIN — TACROLIMUS 0.5 MG: 0.5 CAPSULE ORAL at 08:11

## 2018-11-23 RX ADMIN — ATOVAQUONE 1500 MG: 750 SUSPENSION ORAL at 01:11

## 2018-11-23 RX ADMIN — ASPIRIN 81 MG CHEWABLE TABLET 81 MG: 81 TABLET CHEWABLE at 08:11

## 2018-11-23 RX ADMIN — TACROLIMUS 0.5 MG: 0.5 CAPSULE ORAL at 05:11

## 2018-11-23 RX ADMIN — ERTAPENEM SODIUM 1 G: 1 INJECTION, POWDER, LYOPHILIZED, FOR SOLUTION INTRAMUSCULAR; INTRAVENOUS at 02:11

## 2018-11-23 RX ADMIN — MIDODRINE HYDROCHLORIDE 15 MG: 5 TABLET ORAL at 02:11

## 2018-11-23 RX ADMIN — HEPARIN SODIUM 5000 UNITS: 5000 INJECTION, SOLUTION INTRAVENOUS; SUBCUTANEOUS at 08:11

## 2018-11-23 RX ADMIN — INSULIN ASPART 12 UNITS: 100 INJECTION, SOLUTION INTRAVENOUS; SUBCUTANEOUS at 05:11

## 2018-11-23 RX ADMIN — FUROSEMIDE 80 MG: 40 TABLET ORAL at 08:11

## 2018-11-23 RX ADMIN — INSULIN ASPART 12 UNITS: 100 INJECTION, SOLUTION INTRAVENOUS; SUBCUTANEOUS at 08:11

## 2018-11-23 RX ADMIN — PREDNISONE 10 MG: 10 TABLET ORAL at 08:11

## 2018-11-23 RX ADMIN — INSULIN ASPART 2 UNITS: 100 INJECTION, SOLUTION INTRAVENOUS; SUBCUTANEOUS at 08:11

## 2018-11-23 NOTE — ASSESSMENT & PLAN NOTE
- CRRT 10/14, 10/17, 10/20, 10/31.  - CXR 10/30: abundant fluid in b/l pleural space.  - Continue 80 mg Lasix QD.  - Monitor daily weights.

## 2018-11-23 NOTE — ASSESSMENT & PLAN NOTE
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     Plan:  - Improving renal function with adequate recovery will need to establish new baseline  - Improved sCr 1.4 mg/dL suggest very good renal recovery  - Hepatic angiogram done 24 ml of hypo-osmotic contrast 11/19/2018, suspect collateral perfusion is adequate started ASA  - No uremic symptoms or signs.   - Patient's pitting LE edema improving  -  Will cont to trend Renal fx and electrolytes. Will monitor labs from a distance please feel free to call with Questions  - Continue furosemide at 80 mg PO BID dose, edema improving  - Accutate I/O's and daily standing am weights  - Will monitor labs from a distance tomorrow but please feel free to call with questions

## 2018-11-23 NOTE — PT/OT/SLP PROGRESS
Occupational Therapy      Patient Name:  Scarlett Reddy   MRN:  [id number]    Attempted occupational therapy visit 2x this date. First attempt, pt with RN care and second attempt pt refusal 2/2 fatigue. Pt reports she went walking in the hallways with  and took shower with PCT assistance this date. Educated pt on the importance of participation with therapy however pt politely declined.     Laura corado OT  11/23/2018

## 2018-11-23 NOTE — PLAN OF CARE
Problem: Patient Care Overview  Goal: Plan of Care Review  Outcome: Ongoing (interventions implemented as appropriate)  Pt AAOx4, VSS, in bed with upper siderails raised x2, bed in lowest/locked position, call light/personal belongings within reach. Pt instructed to call for assistance. Pt verbalizes understanding. Pt afebrile at this time.  Proper hand hygiene performed before and after pt care.      UA dirty. Urine cx in progress.    C-diff negative.   R and midline sites on Chevron incision remain packed, only outside dressing changed by RN per orders.   Blood glucose monitored ACHS. Bedtime , no SSI ordered.   H&H stable.   LFTs trending down.   Cr down from 1.8 to 1.6.  Pt compliant with 1500L fluid restriction. Recording strict I&O's.   Midodrine held per order parameters, SBP > 120.  Will relay to day RN that pt's new caregiver needs education on self-meds. Will likely need . Pt/pt's caregiver not pulling any self meds.   Pt encouraged to use hat, UUOP recorded. See flowsheet.    at bedside attentive to pt's needs.     Will continue to monitor patient.

## 2018-11-23 NOTE — ASSESSMENT & PLAN NOTE
- Valcyte for CMV prophylaxis--> holding (10/31) for leukopenia. Pt will need weekly CMV PCR while discontinued.  - CMV PCR noted to be positive at 129. .  - CMV PCR 11/8: 105  - CMV PCR 11/15 with 89 copies  - CMV PCR 11/23 pending  - Bactrim for PCP prophylaxis ([facility]).--> holding (10/31) for leukopenia.   - Start Atovaquone 11/6.  - Fluconazole for fungal prophylaxis (first dose 10/6). D/c Fluc 11/16 bc pt had been on it for > 30 days post take back.

## 2018-11-23 NOTE — PLAN OF CARE
Problem: Patient Care Overview  Goal: Plan of Care Review  - Patient is AAOx4, ambulates with standby assistance and a walker. Patient's  at bedside attentive to patient's needs. Patient educated to use call bell for assistance, verbalized understanding.   - Afebrile, WBC 3.88 - UA with moderate bacteria - patient started on IV ertapenem. Patient reported feeling short of breath and fatigued after receiving ertapenem, IV benadryl 12.5 administered once.   - Patient placed on 2 L O2 NC for complaints of shortness of breath, sats remain > 99%   - R chevron and mid chevron wounds assessed by wound care. Orders placed for daily dressing changes.   - Accuchecks AC/HS - mealtime and SSI administered per order   - See flow sheet for complete assessment details

## 2018-11-23 NOTE — PROGRESS NOTES
[facility]  Liver Transplant  Progress Note    Patient Name: Scarlett Redyd  MRN: [id number]  Admission Date: 10/1/2018  Hospital Length of Stay: 52 days  Code Status: Full Code  Primary Care Provider: Primary Doctor No  Post-Operative Day: 49    ORGAN:   LIVER  Disease Etiology: Cirrhosis: Type B and D  Donor Type:    - Brain Death  CDC High Risk:   No  Donor CMV Status:   Donor CMV Status: Positive  Donor HBcAB:   Negative  Donor HCV Status:   Negative  Donor HBV SETH: Negative  Donor HCV SETH: Negative  Whole or Partial: Whole Liver  Biliary Anastomosis: End to End  Arterial Anatomy: Replaced Left Hepatic and Right Hepatic  Subjective:     History of Present Illness:  Ms. Reddy is a 69 y/o female with PMH of ESLD secondary Hep B and D.  Listed for liver transplant with MELD 23.  Paracentesis 10/1 with 5L removed, no fluid sent for analysis.  Morning labs significant for hyponatremia, Na 119.  Pt admitted to LTS for sodium monitoring.        Hospital Course: 69 y/o F h/o HBV/delta cirrhosis s/p DBDLT 10/5/18 (CMV D+/R+, steroid induction, MMF/tacro/pred maintenance) with take back on 10/6 2/2 hyperbilirubinema and bilious VALORIE drainage, no leak identified. Post-op course c/b hypercapneic and hypoxic respiratory failure and was reintubated though quickly extubated now doing well. Liver bx (10/6) sig for Zone 3 hemorrhage and collapse, in addition to cholestasis. Transferred from ICU on POD #4. ID consulted to comment on positive diphtheroid culture from ascitic fluid (likely skin colonization) as well as ESBL Klebsiella pneumoniae in urine culture (10/4).  Pt asymptomatic and cell count from ascitic fluid unremarkable. Per ID recs, no need to treat diphtheroids or asymptomatic ESBL Kleb pneumo bacteriuria though pt has had 6 days of therapy which would cover these organisms. Mild peritransplant ascites- repeat US 10/6 with increased arterial resistive indices. Repeat US 10/9 w/ continued  elevation in RIs and fluid collections. LFTs trending down nicely.     Acute gradual worsening of renal function s/p OLTx. Creatinine on arrival 0.8 and has been up trending. Nephrology consulted for post-op EVA. Cr cont to trend up but remains to have good UOP. Had stable response to lasiz diuresis.  BUN elevated requiring multiple rounds of dialysis (10/15, 10/17, 10/20, 10/31). No significant improvements in kidney function noted.   In addition, pt H/H cont to fluctuate requiring multiple blood transfusions (10/14, 10/16, 10/18, 10/19, 10/22, 10/27, 10/29).  Pt reported dark stools 10/15 but color has normalized. FOBT+. EGD 10/18 consistent with ulcerated mucosa in duodenum s/p coagulation. Concern for H. Pylori. Started Amoxicillin/levofloxacin (10/19, complete 11/2)). Remains on Protonix 40 mg BID. Biopsy negative for infection and CMV. Will cont close monitoring H/H, transfuse as needed with goal Hb > 7.0. Of note, Urine cx + Klebsiella pneumoniae (10/13). Received 3 day course of ciprofloxacin, dc'd 10/19.   On 10/19 pm pt c/o crushing chest pain. Troponin 0.094, likely 2/2 ischemic stress. EKG NSR.   Liver US 11/6 showed no arterial flow. Paracentesis 11/7 with 8.9 L removed. Wbc 39, segs 16%. Gram stain no organisms seen + no WBC. Pt feels better since having fluid removed but she continues to have worsening abdominal distention & LE edema.Repeat Liver US 11/8 showed arterial flow with increased RIs. Paracentesis 11/12 with 8.6L removed (WBC 34, segs 25%). 11/15 liver u/s showed abnormally decreased main hepatic artery peak systolic velocity with slow arterial flow and abnormal tardus parvus waveforms intrahepatically. Consulted vascular surgery. Repeat liver US 11/19 showed no arterial flow on R and sluggish flow on L. Hepatic angiogram 11/19 confirmed HAT.  Friday 11/16, purulent drainage noted from middle of chevron incision. Area opened and packed. Cx grew klebsiella p. CT scan showed no signs of  infection. On Monday 11/18, it was noted that the right/lateral side of the chevron had 3 small openings draining purulent fluid. Opened the area and packed. Wound care following.    Interval history: No acute events overnight. Patient says she feels better than yesterday. Ambulating. Tolerating diet. Diarrhea improved. Stool (-) for Cdiff. Cx pending. UA 11/21 w/ trace leuks, 1+ blood, moderate wbcs. Urine cx pending. Pt c/o lower back pain yesterday; still present, but improved today. Pt previously w/ ESBL in urine. Will start treatment for UTI with Ertapenem 1g qd x 3 days, per ID recs. Renal fx continues to improve, with a Cr of 1.4 today. Bicarb still slightly elevated. Lasix decreased yesterday to 80 mg QD from 80 mg BID. Will continue at this dose and monitor. Per nephrology, they will sign off next week and recommend outpatient f/u. Pt with 2 open areas of chevron incision, reassessed and packed by wound care today. Continue to monitor. Possible d/c early next week.    Scheduled Meds:   aspirin  81 mg Oral Daily    atovaquone  1,500 mg Oral Daily    entecavir  0.5 mg Oral Every other day    ertapenem (INVANZ) IVPB  1 g Intravenous Q24H    furosemide  80 mg Oral Daily    heparin (porcine)  5,000 Units Subcutaneous Q8H    insulin aspart U-100  6-12 Units Subcutaneous TIDWM    magnesium oxide  800 mg Oral BID    midodrine  15 mg Oral TID    mycophenolate  500 mg Oral BID    pantoprazole  40 mg Oral BID    [START ON 11/24/2018] predniSONE  5 mg Oral Daily    Followed by    [START ON 12/1/2018] predniSONE  2.5 mg Oral Daily    tacrolimus  0.5 mg Oral BID    ursodiol  300 mg Oral BID     Continuous Infusions:  PRN Meds:acetaminophen, bisacodyl, calcium carbonate, dextrose 50%, dextrose 50%, glucagon (human recombinant), glucose, glucose, insulin aspart U-100, ondansetron, ondansetron, polyethylene glycol, sodium chloride 0.9%, traMADol    Review of Systems   Constitutional: Positive for activity  change, appetite change (improving) and fatigue. Negative for chills and fever.   HENT: Negative for congestion, facial swelling and trouble swallowing.    Eyes: Negative for pain, discharge and visual disturbance.   Respiratory: Negative for cough, chest tightness, shortness of breath and wheezing.    Cardiovascular: Positive for leg swelling. Negative for chest pain and palpitations.   Gastrointestinal: Positive for abdominal distention and abdominal pain. Negative for constipation, diarrhea, nausea and vomiting.   Endocrine: Negative.    Genitourinary: Negative for decreased urine volume, difficulty urinating and dysuria.   Musculoskeletal: Positive for back pain. Negative for arthralgias, neck pain and neck stiffness.   Skin: Positive for wound.   Allergic/Immunologic: Positive for immunocompromised state.   Neurological: Positive for weakness. Negative for tremors and headaches.   Psychiatric/Behavioral: Negative for confusion and sleep disturbance.     Objective:     Vital Signs (Most Recent):  Temp: 98.3 °F (36.8 °C) (11/23/18 1129)  Pulse: (!) 112 (11/23/18 1119)  Resp: 20 (11/23/18 1119)  BP: (!) 146/87 (11/23/18 1119)  SpO2: 98 % (11/23/18 1119) Vital Signs (24h Range):  Temp:  [97.8 °F (36.6 °C)-99 °F (37.2 °C)] 98.3 °F (36.8 °C)  Pulse:  [] 112  Resp:  [15-20] 20  SpO2:  [96 %-100 %] 98 %  BP: (109-146)/(57-87) 146/87     Weight: 64.8 kg (142 lb 13.7 oz)  Body mass index is 25.31 kg/m².    Intake/Output - Last 3 Shifts       11/21 0700 - 11/22 0659 11/22 0700 - 11/23 0659 11/23 0700 - 11/24 0659    P.O. 300 1110     I.V. (mL/kg)       Blood 192.5      Total Intake(mL/kg) 492.5 (7.6) 1110 (17.1)     Urine (mL/kg/hr)  200 (0.1)     Emesis/NG output  0     Other 4000 0     Stool  0     Total Output 4000 200     Net -3507.5 +910            Urine Occurrence 2 x 5 x     Stool Occurrence 1 x 4 x     Emesis Occurrence  0 x           Physical Exam   Constitutional: She is oriented to person, place, and  time. She appears well-developed. No distress.   Temporal and distal extremity muscle wasting   HENT:   Head: Normocephalic and atraumatic.   Eyes: EOM are normal. No scleral icterus.   Neck: Normal range of motion.   Cardiovascular: Normal rate, regular rhythm and normal heart sounds.   No murmur heard.  Pulmonary/Chest: Effort normal. No respiratory distress. She has no wheezes.   Breath sounds decreased at bases   Abdominal: Soft. Bowel sounds are normal. She exhibits distension. There is tenderness. There is no guarding.   Purulent drainage expressed from middle chevron incision + R lateral side, both areas with packing in place.  Minimal tissue erythema  RLQ VALORIE drain sites c/d/i with sutures   Musculoskeletal: Normal range of motion. She exhibits edema (2+ BLE, + generalized).   Neurological: She is alert and oriented to person, place, and time.   Skin: Skin is warm. She is not diaphoretic.   Psychiatric: She has a normal mood and affect.   Nursing note and vitals reviewed.      Laboratory:  Immunosuppressants         Stop Route Frequency     tacrolimus capsule 0.5 mg      -- Oral 2 times daily     mycophenolate capsule 500 mg      -- Oral 2 times daily        CBC:   Recent Labs   Lab 11/23/18  0705   WBC 3.88*   RBC 2.94*   HGB 8.4*   HCT 27.0*   PLT 88*   MCV 92   MCH 28.6   MCHC 31.1*     CMP:   Recent Labs   Lab 11/23/18  0705   *   CALCIUM 8.6*   ALBUMIN 3.0*   PROT 4.9*   *   K 3.9   CO2 29   CL 93*   BUN 63*   CREATININE 1.4   ALKPHOS 83   ALT 5*   AST 12   BILITOT 1.3*     Labs within the past 24 hours have been reviewed.    Diagnostic Results:  I have personally reviewed all pertinent imaging studies.    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   - POD 1 US: increased arterial resistive indices, suggestive of edema  "vs acute rejection vs congestion.  - Repeat US 10/9 with continued elevation of RIs.  - LFTs stable.  - T bili trending down.   - Repeat liver US (10/11): elevated RIs.  - Liver US 11/6 to assess for ascites. No arterial flow within the liver allograft concerning for interval thrombosis/occlusion of the arterial system. Severe ascites seen.  - Liver US 11/8: arterial flow seen w/ elevated RIs   - LFTs remain stable. No CTA at this time due to EVA and normal liver function.   - Liver US 11/15 showed "abnormally decreased main hepatic artery peak systolic velocity with slow arterial flow and abnormal tardus parvus waveforms intrahepatically."   -Vascular surgery consulted  - Liver US 11/19 showed no arterial flow on the right and sluggish flow on the left.   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 No further intervention planned at this time as patient's LFTs are stable. Will monitor.         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  - Continue to monitor & replace as needed.     Hypophosphatemia    - Continue to monitor & replace as needed.       Delayed surgical wound healing    - Middle chevron incision opened 11/16 with purulent drainage  - Packed with Aquacel ag at this time. Growing Klebsiella p.   - CT abd/pelvis 11/17- no signs of infection  - 11/17: R side of chevron incision w/ 3 small holes draining purulent fluid, opened and packed w/ aquacel.   - Wound care following. Appreciate recs.          Hepatic artery thrombosis of transplanted liver    - Liver US 11/19 showed no arterial flow to R and sluggish flow to L  - Hepatic angiogram 11/19 confirmed HAT  - No further intervention planned at this time. Will continue to monitor.     Thrombocytopenia, unspecified    - Improving.  - Monitor.       Alteration in skin integrity    Monitor.       Leukopenia    - Stopped Cellcept, Bactrim, and " "Valcyte 10/31 2/2 leukopenia  - Started Atovaquone 11/6.   - CMV PCR noted to be positive at 129. Will hold off on treatment with valcyte at this time.  - Continue to monitor.       Azotemia    - CRRT 10/31 for uremic toxin clearance.           UTI (urinary tract infection)    - Urine Cx 10/15 w/ Klebsiella pneumonia.  - Started Ciprofloxacin (10/17-10/19).   - Started on amoxicillin/levo for H. Pylori (completed abx 11/2).  - Pt c/o lower back pain 11/22. UA w/ trace leuks, moderate wbs, 1+ blood, cx pending  - Pt symptomatic & with ESBL in urine previously, so will start treatment with Ertapenem 1g QD x 3 days, per ID recs        Acute blood loss anemia    - H&H cont to fluctuate.   - FOBT +.  - EGD 10/18 - showed diffuse bleeding with ulcerated duodenal mucosa.  - EGD 11/5 - improvement seen, no active bleeding.  - Resume heparin sq injections 11/6.   - Decrease heparin sq from TID to BID 11/15   - Heparin dc'd 11/17  - See "anemia requiring transfusions."  - Continue to monitor.             Post LT - Acute renal failure    - Creatinine on arrival 0.8 and trended up.  - Nephrology consulted.  - Renal US (10/11): no hydronephrosis.  - Echo 10/12: diastolic dysfunction, likely 2/2 to ARF.  - CRRT 10/14; SLED 10/17, 10/20. --> no improvements noted in kidney fxn.  - 24 hr urine creatine collection: CrCl of 7.   - CRRT 10/31 with 1L removed.   - See "hypervolemia associated with renal insufficiency"     Hypervolemia associated with renal insufficiency    - CRRT 10/14, 10/17, 10/20, 10/31.  - CXR 10/30: abundant fluid in b/l pleural space.  - Continue 80 mg Lasix QD.  - Monitor daily weights.       EVA (acute kidney injury)    - No plans for RRT in near future, per nephrology  - Trialysis line removed 11/13  - Nephrology involved. Appreciate recs.  - Renal fx showing improvement   - Monitor.     At risk for opportunistic infections    - Valcyte for CMV prophylaxis--> holding (10/31) for leukopenia. Pt will need weekly " CMV PCR while discontinued.  - CMV PCR noted to be positive at 129. .  - CMV PCR 11/8: 105  - CMV PCR 11/15 with 89 copies  - CMV PCR 11/23 pending  - Bactrim for PCP prophylaxis ([facility]).--> holding (10/31) for leukopenia.   - Start Atovaquone 11/6.  - Fluconazole for fungal prophylaxis (first dose 10/6). D/c Fluc 11/16 bc pt had been on it for > 30 days post take back.       Long-term use of immunosuppressant medication    - Prograf: stopped 10/29. Resumed 11/2.  --> Will monitor for signs of toxic side effects, check daily troughs, and change meds accordingly.   - Prednisone: stopped 10/29. Started on Hydrocortisone. pred taper resumed.  - Cellcept: stopped 10/31 for leukopenia. Restarted Cellcept 500 mg bid, 11/6.         Prophylactic immunotherapy    - See long term use immunosuppresion.       Ascites    - Paracentesis 10/1, 5L removed, no fluid sent for analysis.  - Pt remains volume overloaded.   - Liver US 11/6 with severe ascites.   - Paracentesis 11/7 with 8.9 L removed. Wbc 39, segs 16%. Gram stain - no organisms seen and no WBC. Cultures NGTD.  - Paracentesis 11/12: 8.6 L removed. 34 wbc, 25% segs, 33 lymphs, 42 monocytes/macrophages. Cultures NGTD  - Paracentesis 11/21 with 4 L removed, wbc neg for infection.     Weakness    - See physical deconditioning.       Physical deconditioning    - PT/OT consulted.  - Recommend home health PT and rolling walker at discharge (orders placed 11/6).       Hyponatremia    - Na 119 on admit.  - Na was improving post transplant w HD.  - Na stable at this time.             Hypotension    - continue Midodrine 15 mg TID. Monitor       Anemia requiring transfusions    - Fluctuating H&H.    - Transfusions: 10/14; 10/16; 10/18; 10/19; 10/22; 10/27; 10/29, 10/31, 11/3, 11/21  - FOBT +  - LDH elevated, Hapto < 10.  - EGD 10/18: ulcerated duodenal mucosa.   - Consulted GI about this ongoing issue and further need of another EGD.  - EGD 11/5 unremarkable.   - Iron studies 11/13:   95, TIBC 107, sat iron 89, transferrin 72, ferritin pending  - Hemolytic anemia workup 11/13: Haptoglobin 132, retic 3.9   - Transfused 1upRBC 11/21. H/H now improved.   - Will continue to monitor closely.           Severe malnutrition    - Dietary consulted.   - Temporal and distal extremity muscle wasting and edema.  - Encourage supplements and to increase nutritional intake.  - Per nephrology, pt to be on low protein diet.   - Prealbumin reviewed.      Type 2 diabetes mellitus with diabetic polyneuropathy, with long-term current use of insulin    - Continue home regimen.  - Endocrine consulted.  - Pt off insulin drip at this time.  Apprec endo recs.        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  - Tx w/ Entecavir- dose changed to q72 hours given renal function.             VTE Risk Mitigation (From admission, onward)        Ordered     heparin (porcine) injection 5,000 Units  Every 8 hours      11/22/18 1014     IP VTE HIGH RISK PATIENT  Once      11/05/18 1145     Place sequential compression device  Until discontinued      10/05/18 0709          The patients clinical status was discussed at multidisplinary rounds, involving transplant surgery, transplant medicine, pharmacy, nursing, nutrition, and social work    Discharge Planning:  No Patient Care Coordination Note on file.      Elizabeth Dean PA-C  Liver Transplant  [facility]

## 2018-11-23 NOTE — PROGRESS NOTES
Notified AGA PERKINS of the following:  Patient with complaints of exhaustion, chills and shortness of breath. Patient placed on 2 L - oxygen saturation 99%, RR 17-20, HR elevated to 100's, 's/80's, temp 97.9. Patient worried that she is having a reaction to the antibiotics. PA to come to bedside and assess.

## 2018-11-23 NOTE — ASSESSMENT & PLAN NOTE
- Urine Cx 10/15 w/ Klebsiella pneumonia.  - Started Ciprofloxacin (10/17-10/19).   - Started on amoxicillin/levo for H. Pylori (completed abx 11/2).  - Pt c/o lower back pain 11/22. UA w/ trace leuks, moderate wbs, 1+ blood, cx pending  - Pt symptomatic & with ESBL in urine previously, so will start treatment with Ertapenem 1g QD x 3 days, per ID recs

## 2018-11-23 NOTE — SUBJECTIVE & OBJECTIVE
"Interval HPI:   Overnight events:  Remains in TSU. NAEON. BG above goal with scheduled insulin and correction scale.   Eatin%  Nausea: No  Hypoglycemia and intervention: No  Fever: No  TPN and/or TF: No    /75   Pulse 93   Temp 99 °F (37.2 °C) (Oral)   Resp 17   Ht 5' 3" (1.6 m)   Wt 64.8 kg (142 lb 13.7 oz)   LMP  (LMP Unknown)   SpO2 96%   Breastfeeding? No   BMI 25.31 kg/m²     Labs Reviewed and Include    Recent Labs   Lab 18  0705   *   CALCIUM 8.6*   ALBUMIN 3.0*   PROT 4.9*   *   K 3.9   CO2 29   CL 93*   BUN 63*   CREATININE 1.4   ALKPHOS 83   ALT 5*   AST 12   BILITOT 1.3*     Lab Results   Component Value Date    WBC 3.88 (L) 2018    HGB 8.4 (L) 2018    HCT 27.0 (L) 2018    MCV 92 2018    PLT 88 (L) 2018     No results for input(s): TSH, FREET4 in the last 168 hours.  Lab Results   Component Value Date    HGBA1C 6.0 (H) 10/04/2018       Nutritional status:   Body mass index is 25.31 kg/m².  Lab Results   Component Value Date    ALBUMIN 3.0 (L) 2018    ALBUMIN 3.3 (L) 2018    ALBUMIN 3.4 (L) 2018     Lab Results   Component Value Date    PREALBUMIN 15 (L) 2018    PREALBUMIN 12 (L) 2018       Estimated Creatinine Clearance: 34.4 mL/min (based on SCr of 1.4 mg/dL).    Accu-Checks  Recent Labs     18  1746 18  2053 18  0741 18  1148 18  1658 18  2217 18  0802 18  1205 18  1835 18  2240   POCTGLUCOSE 212* 155* 193* 245* 161* 213* 250* 268* 275* 149*       Current Medications and/or Treatments Impacting Glycemic Control  Immunotherapy:    Immunosuppressants         Stop Route Frequency     tacrolimus capsule 0.5 mg      -- Oral 2 times daily     mycophenolate capsule 500 mg      -- Oral 2 times daily        Steroids:   Hormones (From admission, onward)    Start     Stop Route Frequency Ordered    18 0900  predniSONE tablet 2.5 mg       0859 " Oral Daily 11/05/18 1004    11/24/18 0900  predniSONE tablet 5 mg      12/01 0859 Oral Daily 11/05/18 1004    11/17/18 0900  predniSONE tablet 10 mg      11/24 0859 Oral Daily 11/05/18 1004    10/17/18 0945  predniSONE tablet 15 mg      10/24 0859 Oral Daily 10/17/18 0943        Pressors:    Autonomic Drugs (From admission, onward)    Start     Stop Route Frequency Ordered    10/10/18 1500  midodrine tablet 15 mg      -- Oral 3 times daily 10/10/18 1122    10/06/18 1617  rocuronium (ZEMURON) 10 mg/mL injection     Comments:  Created by cabinet overrholley    10/07 0429   10/06/18 1617        Hyperglycemia/Diabetes Medications:   Antihyperglycemics (From admission, onward)    Start     Stop Route Frequency Ordered    11/20/18 1645  insulin aspart U-100 pen 5-10 Units      -- SubQ 3 times daily with meals 11/20/18 1541    10/14/18 0925  insulin aspart U-100 pen 0-5 Units      -- SubQ Before meals & nightly PRN 10/14/18 0825    10/07/18 1200  insulin regular (Humulin R) 100 Units in sodium chloride 0.9% 100 mL infusion      10/13 2100 IV Continuous 10/07/18 1055

## 2018-11-23 NOTE — PROGRESS NOTES
"[facility]  Endocrinology  Progress Note    Admit Date: 10/1/2018     Reason for Consult: Management of type 2 DM, Hyperglycemia      Surgical Procedure and Date: Liver transplant 10/5/18, Ex lap 10/6/18     Diabetes diagnosis year:      Home Diabetes Medications:  Lantus 18 units HS and novolog 17 units with meals plus correction scale (150-200 +1)        Lab Results   Component Value Date     HGBA1C 6.8 (H) 2018        How often checking glucose at home? 3-4   BG readings on regimen: 120-150.  Post prandial readings as high as upper 200s  Hypoglycemia on the regimen? yes, none recently   Missed doses on regimen?  No     Diabetes Complications include:     Diabetic peripheral neuropathy      Complicating diabetes co morbidities:   CIRRHOSIS        HPI:  Patient is a 68 y.o. female with a diagnosis of ESLD, listed for liver transplant with meld 23 who underwent live transplant on 10/5/18.  Back to OR on 10/6/18 for ex lap.  Admitted 10/1/18 for hyponatremia s/p paracentesis.  Personal has known diagnosis of diabetes, endocrine consulted for diabetes management.    Post-operative course complicated by ESBL Klebsiella pneumoniae in urine (in contact isolation), peritransplant ascites, worsening renal function (required temporary dialysis), anemia (multiple blood transfusions), and possible GI bleed.     Interval HPI:   Overnight events:  Remains in TSU. NAEON. BG above goal with scheduled insulin and correction scale.   Eatin%  Nausea: No  Hypoglycemia and intervention: No  Fever: No  TPN and/or TF: No    /75   Pulse 93   Temp 99 °F (37.2 °C) (Oral)   Resp 17   Ht 5' 3" (1.6 m)   Wt 64.8 kg (142 lb 13.7 oz)   LMP  (LMP Unknown)   SpO2 96%   Breastfeeding? No   BMI 25.31 kg/m²      Labs Reviewed and Include    Recent Labs   Lab 18  0705   *   CALCIUM 8.6*   ALBUMIN 3.0*   PROT 4.9*   *   K 3.9   CO2 29   CL 93*   BUN 63*   CREATININE 1.4   ALKPHOS " 83   ALT 5*   AST 12   BILITOT 1.3*     Lab Results   Component Value Date    WBC 3.88 (L) 11/23/2018    HGB 8.4 (L) 11/23/2018    HCT 27.0 (L) 11/23/2018    MCV 92 11/23/2018    PLT 88 (L) 11/23/2018     No results for input(s): TSH, FREET4 in the last 168 hours.  Lab Results   Component Value Date    HGBA1C 6.0 (H) 10/04/2018       Nutritional status:   Body mass index is 25.31 kg/m².  Lab Results   Component Value Date    ALBUMIN 3.0 (L) 11/23/2018    ALBUMIN 3.3 (L) 11/22/2018    ALBUMIN 3.4 (L) 11/21/2018     Lab Results   Component Value Date    PREALBUMIN 15 (L) 11/03/2018    PREALBUMIN 12 (L) 09/17/2018       Estimated Creatinine Clearance: 34.4 mL/min (based on SCr of 1.4 mg/dL).    Accu-Checks  Recent Labs     11/20/18  1746 11/20/18  2053 11/21/18  0741 11/21/18  1148 11/21/18  1658 11/21/18  2217 11/22/18  0802 11/22/18  1205 11/22/18  1835 11/22/18  2240   POCTGLUCOSE 212* 155* 193* 245* 161* 213* 250* 268* 275* 149*       Current Medications and/or Treatments Impacting Glycemic Control  Immunotherapy:    Immunosuppressants         Stop Route Frequency     tacrolimus capsule 0.5 mg      -- Oral 2 times daily     mycophenolate capsule 500 mg      -- Oral 2 times daily        Steroids:   Hormones (From admission, onward)    Start     Stop Route Frequency Ordered    12/01/18 0900  predniSONE tablet 2.5 mg      12/08 0859 Oral Daily 11/05/18 1004    11/24/18 0900  predniSONE tablet 5 mg      12/01 0859 Oral Daily 11/05/18 1004    11/17/18 0900  predniSONE tablet 10 mg      11/24 0859 Oral Daily 11/05/18 1004    10/17/18 0945  predniSONE tablet 15 mg      10/24 0859 Oral Daily 10/17/18 0943        Pressors:    Autonomic Drugs (From admission, onward)    Start     Stop Route Frequency Ordered    10/10/18 1500  midodrine tablet 15 mg      -- Oral 3 times daily 10/10/18 1122    10/06/18 1617  rocuronium (ZEMURON) 10 mg/mL injection     Comments:  Created by cabinet override    10/07 0429   10/06/18 6260         Hyperglycemia/Diabetes Medications:   Antihyperglycemics (From admission, onward)    Start     Stop Route Frequency Ordered    11/20/18 1645  insulin aspart U-100 pen 5-10 Units      -- SubQ 3 times daily with meals 11/20/18 1541    10/14/18 0925  insulin aspart U-100 pen 0-5 Units      -- SubQ Before meals & nightly PRN 10/14/18 0825    10/07/18 1200  insulin regular (Humulin R) 100 Units in sodium chloride 0.9% 100 mL infusion      10/13 2100 IV Continuous 10/07/18 1055          ASSESSMENT and PLAN    * Liver transplanted    Managed per primary team  Avoid hypoglycemia    Recent Labs   Lab 11/22/18  0619   ALT 5*   AST 13   ALKPHOS 73   BILITOT 1.3*   PROT 4.9*   ALBUMIN 3.3*            Type 2 diabetes mellitus with diabetic polyneuropathy, with long-term current use of insulin    BG goal 140-180.       Low dose correction scale given kidney function.  BG monitoring AC/HS   novolog 6 units with meals if BG <120 (or call endocrine) or 12 units with meals if >120.     Start self/caregiver administration of insulin to review for home.     Discharge planning:  TBD     EVA (acute kidney injury)    Avoid insulin stacking and hypoglycemia.  Lab Results   Component Value Date    CREATININE 1.6 (H) 11/22/2018        Prophylactic immunotherapy    May increase insulin resistance.        Severe malnutrition    Oral intake encouraged, may affect BG readings         Aviva Caldera NP  Endocrinology  [facility]

## 2018-11-23 NOTE — NURSING
Asked by Elizabeth JEONG To assess abd wounds.   Upon assessment noted two wounds to the chevron incision on abdomen.     Open area to the midline is approx 0.8 x 2.2 x 1.5 with approx 2 cm tunnels at 3 and 9 oclock. Wound bed pink with mostly slough. The drainage was moderate yellow. Wound cleansed and packed with aquacel ag.     The right lateral open area is approx 2 x 3.3 x 1.4. Wound bed mostly adipose tissue with thin whitish drainage noted. Wound cleansed and packed with aquacel ag.   There is some weeping to the RLQ. Area cleansed and zinc applied and covered with ABD pad.   Recommend increasing dressing changes due to drainage and slough  Switch to iodoform packing to each wound daily  Barrier and abd to weeping areas.   Wound care to follow PRN.  Mariely De León RN [facility]   [phone]

## 2018-11-23 NOTE — SUBJECTIVE & OBJECTIVE
Interval History:   Has been doing well, feeling well this am. UOP not quantify but edema improving and sCr improving down to 1.4 mg/dL. Stable LFT's. Renal function keeps improving.     Review of patient's allergies indicates:  No Known Allergies  Current Facility-Administered Medications   Medication Frequency    acetaminophen tablet 650 mg Q6H PRN    aspirin chewable tablet 81 mg Daily    atovaquone suspension 1,500 mg Daily    bisacodyl suppository 10 mg Daily PRN    calcium carbonate 200 mg calcium (500 mg) chewable tablet 500 mg TID PRN    dextrose 50% injection 12.5 g PRN    dextrose 50% injection 25 g PRN    entecavir tablet 0.5 mg Every other day    furosemide tablet 80 mg Daily    glucagon (human recombinant) injection 1 mg PRN    glucose chewable tablet 16 g PRN    glucose chewable tablet 24 g PRN    heparin (porcine) injection 5,000 Units Q8H    insulin aspart U-100 pen 0-5 Units QID (AC + HS) PRN    insulin aspart U-100 pen 6-12 Units TIDWM    magnesium oxide tablet 800 mg BID    midodrine tablet 15 mg TID    mycophenolate capsule 500 mg BID    ondansetron disintegrating tablet 8 mg Q8H PRN    ondansetron injection 4 mg Q8H PRN    pantoprazole EC tablet 40 mg BID    polyethylene glycol packet 17 g Daily PRN    [START ON 11/24/2018] predniSONE tablet 5 mg Daily    Followed by    [START ON 12/1/2018] predniSONE tablet 2.5 mg Daily    sodium chloride 0.9% flush 3 mL PRN    tacrolimus capsule 0.5 mg BID    traMADol tablet 50 mg Q6H PRN    ursodiol capsule 300 mg BID       Objective:     Vital Signs (Most Recent):  Temp: 98.3 °F (36.8 °C) (11/23/18 1129)  Pulse: (!) 112 (11/23/18 1119)  Resp: 20 (11/23/18 1119)  BP: (!) 146/87 (11/23/18 1119)  SpO2: 98 % (11/23/18 1119)  O2 Device (Oxygen Therapy): room air (11/23/18 0850) Vital Signs (24h Range):  Temp:  [97.8 °F (36.6 °C)-99 °F (37.2 °C)] 98.3 °F (36.8 °C)  Pulse:  [] 112  Resp:  [15-20] 20  SpO2:  [96 %-100 %] 98 %  BP:  (109-146)/(57-87) 146/87     Weight: 64.8 kg (142 lb 13.7 oz) (11/22/18 0557)  Body mass index is 25.31 kg/m².  Body surface area is 1.7 meters squared.    I/O last 3 completed shifts:  In: 1410 [P.O.:1410]  Out: 200 [Urine:200]    Physical Exam   Constitutional: She is oriented to person, place, and time. She appears well-developed. No distress.   Temporal and distal extremity muscle wasting   HENT:   Head: Normocephalic and atraumatic.   Eyes: EOM are normal. No scleral icterus.   Neck: Normal range of motion.   Cardiovascular: Normal rate and regular rhythm.   No murmur heard.  Pulmonary/Chest: Effort normal. No respiratory distress. She has no wheezes.   Abdominal: Bowel sounds are normal. She exhibits distension and ascites. There is tenderness. There is no guarding.   Chevron incision w/ steri strips, minimal serous leakage from R side   RLQ VALORIE drain sites c/d/i with sutures   Musculoskeletal: Normal range of motion. She exhibits edema (Improving LE, ).   Neurological: She is alert and oriented to person, place, and time.   Skin: Skin is warm. She is not diaphoretic.   Psychiatric: She has a normal mood and affect.   Nursing note and vitals reviewed.    Interval History:   No acute events overnight. UOP 1050 ml UOP overnight fluid balance -450 ml yesterday. sCr very slowly improving. [facility] She feels well.     Review of patient's allergies indicates:  No Known Allergies  Current Facility-Administered Medications   Medication Frequency    acetaminophen tablet 650 mg Q6H PRN    aspirin chewable tablet 81 mg Daily    atovaquone suspension 1,500 mg Daily    bisacodyl suppository 10 mg Daily PRN    calcium carbonate 200 mg calcium (500 mg) chewable tablet 500 mg TID PRN    dextrose 50% injection 12.5 g PRN    dextrose 50% injection 25 g PRN    entecavir tablet 0.5 mg Every other day    furosemide tablet 80 mg Daily    glucagon (human recombinant)  injection 1 mg PRN    glucose chewable tablet 16 g PRN    glucose chewable tablet 24 g PRN    heparin (porcine) injection 5,000 Units Q8H    insulin aspart U-100 pen 0-5 Units QID (AC + HS) PRN    insulin aspart U-100 pen 6-12 Units TIDWM    magnesium oxide tablet 800 mg BID    midodrine tablet 15 mg TID    mycophenolate capsule 500 mg BID    ondansetron disintegrating tablet 8 mg Q8H PRN    ondansetron injection 4 mg Q8H PRN    pantoprazole EC tablet 40 mg BID    polyethylene glycol packet 17 g Daily PRN    [START ON 11/24/2018] predniSONE tablet 5 mg Daily    Followed by    [START ON 12/1/2018] predniSONE tablet 2.5 mg Daily    sodium chloride 0.9% flush 3 mL PRN    tacrolimus capsule 0.5 mg BID    traMADol tablet 50 mg Q6H PRN    ursodiol capsule 300 mg BID       Objective:     Vital Signs (Most Recent):  Temp: 98.3 °F (36.8 °C) (11/23/18 1129)  Pulse: (!) 112 (11/23/18 1119)  Resp: 20 (11/23/18 1119)  BP: (!) 146/87 (11/23/18 1119)  SpO2: 98 % (11/23/18 1119)  O2 Device (Oxygen Therapy): room air (11/23/18 0850) Vital Signs (24h Range):  Temp:  [97.8 °F (36.6 °C)-99 °F (37.2 °C)] 98.3 °F (36.8 °C)  Pulse:  [] 112  Resp:  [15-20] 20  SpO2:  [96 %-100 %] 98 %  BP: (109-146)/(57-87) 146/87     Weight: 64.8 kg (142 lb 13.7 oz) (11/22/18 0557)  Body mass index is 25.31 kg/m².  Body surface area is 1.7 meters squared.    I/O last 3 completed shifts:  In: 1410 [P.O.:1410]  Out: 200 [Urine:200]    Physical Exam   Constitutional: She is oriented to person, place, and time. She appears well-developed. No distress.   Temporal and distal extremity muscle wasting   HENT:   Head: Normocephalic and atraumatic.   Eyes: EOM are normal. No scleral icterus.   Neck: Normal range of motion.   Cardiovascular: Normal rate and regular rhythm.   No murmur heard.  Pulmonary/Chest: Effort normal. No respiratory distress. She has no wheezes.   Abdominal: Bowel sounds are normal. She exhibits distension has some  ascitis. There is tenderness. There is no guarding.   Chevron incision w/ steri strips, minimal serous leakage from R side   RLQ VALORIE drain sites c/d/i with sutures   Musculoskeletal: Normal range of motion. She exhibits edema (1+ BLE, + generalized).   Neurological: She is alert and oriented to person, place, and time.   Skin: Skin is warm. She is not diaphoretic.   Psychiatric: She has a normal mood and affect.   Nursing note and vitals reviewed.      Significant Labs:  CBC:   Recent Labs   Lab 11/23/18 0705   WBC 3.88*   RBC 2.94*   HGB 8.4*   HCT 27.0*   PLT 88*   MCV 92   MCH 28.6   MCHC 31.1*     CMP:   Recent Labs   Lab 11/23/18 0705   *   CALCIUM 8.6*   ALBUMIN 3.0*   PROT 4.9*   *   K 3.9   CO2 29   CL 93*   BUN 63*   CREATININE 1.4   ALKPHOS 83   ALT 5*   AST 12   BILITOT 1.3*     All labs within the past 24 hours have been reviewed.     Significant Imaging:  US Liver Transplant Impression       Abnormally decreased main hepatic artery peak systolic velocity with slow arterial flow and abnormal tardus parvus waveforms intrahepatically.  Findings concerning for developing hepatic artery stenosis/rejection.  Recommend continued close follow-up with consideration of possible contrast enhanced triple-phase abdominal CT for further evaluation if clinically indicated.    Peritransplant fluid collection as above.    Grossly stable ascites.    Right-sided pleural effusion.    This report was flagged in Epic as abnormal.    Electronically signed by resident: Ciro Love  Date: 11/15/2018  Time: 10:19    Electronically signed by: Yvan Watkins MD  Date: 11/15/2018  Time: 10:29         Significant Labs:  CBC:   Recent Labs   Lab 11/23/18 0705   WBC 3.88*   RBC 2.94*   HGB 8.4*   HCT 27.0*   PLT 88*   MCV 92   MCH 28.6   MCHC 31.1*     CMP:   Recent Labs   Lab 11/23/18  0705   *   CALCIUM 8.6*   ALBUMIN 3.0*   PROT 4.9*   *   K 3.9   CO2 29   CL 93*   BUN 63*   CREATININE 1.4   ALKPHOS 83    ALT 5*   AST 12   BILITOT 1.3*     All labs within the past 24 hours have been reviewed.     Significant Imaging:  US Liver Transplant Impression       Abnormally decreased main hepatic artery peak systolic velocity with slow arterial flow and abnormal tardus parvus waveforms intrahepatically.  Findings concerning for developing hepatic artery stenosis/rejection.  Recommend continued close follow-up with consideration of possible contrast enhanced triple-phase abdominal CT for further evaluation if clinically indicated.    Peritransplant fluid collection as above.    Grossly stable ascites.    Right-sided pleural effusion.    This report was flagged in Epic as abnormal.    Electronically signed by resident: Ciro Love  Date: 11/15/2018  Time: 10:19    Electronically signed by: Yvan Watkins MD  Date: 11/15/2018  Time: 10:29

## 2018-11-23 NOTE — PROGRESS NOTES
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 Post-op course c/b hypercapneic and hypoxic respiratory failure and was reintubated though quickly extubated now doing well. Liver bx (10/6) sig for Zone 3 hemorrhage and collapse, in addition to cholestasis. Transferred from ICU on POD #4. ID consulted to comment on positive diphtheroid culture from ascitic fluid (likely skin colonization) as well as ESBL Klebsiella pneumoniae in urine culture (10/4).  Pt asymptomatic and cell count from ascitic fluid unremarkable. Per ID recs, no need to treat diphtheroids or asymptomatic ESBL Kleb pneumo bacteriuria though pt has had 6 days of therapy which would cover these organisms. Mild peritransplant ascites- repeat US 10/6 with increased arterial resistive indices. Repeat US 10/9 w/ continued elevation in RIs and fluid collections. LFTs trending down nicely.      Acute gradual worsening of renal function s/p OLTx.Creatinine on arrival 0.8 and has been up trending. Nephrology consulted for post-op EVA. Cr cont to trend up but remains to have good UOP. Had stable response to lasiz diuresis.  BUN elevated requiring multiple rounds of dialysis (10/15, 10/17, 10/20, 10/31). No significant improvements in kidney function noted.   In addition, pt H/H cont to fluctuate requiring multiple blood transfusions (10/14, 10/16, 10/18, 10/19, 10/22, 10/27, 10/29).  Pt reported dark stools 10/15 but color has normalized. FOBT+. EGD 10/18 consistent with ulcerated mucosa in duodenum s/p  coagulation. Concern for H. Pylori. Started Amoxicillin/levofloxacin (10/19, complete 11/2)). Remains on Protonix 40 mg BID. Biopsy negative for infection and CMV. Will cont close monitoring H/H, transfuse as needed with goal Hb > 7.0.  Of note, Urine cx + Klebsiella pneumoniae (10/13). Received 3 day course of ciprofloxacin, dc'd 10/19.   On 10/19 pm pt c/o crushing chest pain. Troponin 0.094, likely 2/2 ischemic stress. EKG NSR.      11/5/18 - no acute events overnight. Pt reports feeling well this am. One unit of PRBCs given 11/3 with good response. Cr level noted with only minimal increases. Lasix placed on hold per nephrology recs. Will plan to hydrate today w/ albumin. EGD 11/5 unimpressive for overt bleed. Pt remains on protonix 40 BID. Denies melena. Cont to hold heparin. WBC cont to decrease. Immunosuppression managed with hydrocortisone 50 (cont to hold Cellcept, bactrim, valcyte). Prograf now restarted and transitioned IV steroids to PO prednisone on 11/3. CMV PCR noted with 129. Cont to monitor.     Interval History:   Has been doing well, feeling well this am. UOP not quantify but edema improving and sCr improving down to 1.4 mg/dL. Stable LFT's. Renal function keeps improving.     Review of patient's allergies indicates:  No Known Allergies  Current Facility-Administered Medications   Medication Frequency    acetaminophen tablet 650 mg Q6H PRN    aspirin chewable tablet 81 mg Daily    atovaquone suspension 1,500 mg Daily    bisacodyl suppository 10 mg Daily PRN    calcium carbonate 200 mg calcium (500 mg) chewable tablet 500 mg TID PRN    dextrose 50% injection 12.5 g PRN    dextrose 50% injection 25 g PRN    entecavir tablet 0.5 mg Every other day    furosemide tablet 80 mg Daily    glucagon (human recombinant) injection 1 mg PRN    glucose chewable tablet 16 g PRN    glucose chewable tablet 24 g PRN    heparin (porcine) injection 5,000 Units Q8H    insulin aspart U-100 pen 0-5 Units QID  (AC + HS) PRN    insulin aspart U-100 pen 6-12 Units TIDWM    magnesium oxide tablet 800 mg BID    midodrine tablet 15 mg TID    mycophenolate capsule 500 mg BID    ondansetron disintegrating tablet 8 mg Q8H PRN    ondansetron injection 4 mg Q8H PRN    pantoprazole EC tablet 40 mg BID    polyethylene glycol packet 17 g Daily PRN    [START ON 11/24/2018] predniSONE tablet 5 mg Daily    Followed by    [START ON 12/1/2018] predniSONE tablet 2.5 mg Daily    sodium chloride 0.9% flush 3 mL PRN    tacrolimus capsule 0.5 mg BID    traMADol tablet 50 mg Q6H PRN    ursodiol capsule 300 mg BID       Objective:     Vital Signs (Most Recent):  Temp: 98.3 °F (36.8 °C) (11/23/18 1129)  Pulse: (!) 112 (11/23/18 1119)  Resp: 20 (11/23/18 1119)  BP: (!) 146/87 (11/23/18 1119)  SpO2: 98 % (11/23/18 1119)  O2 Device (Oxygen Therapy): room air (11/23/18 0850) Vital Signs (24h Range):  Temp:  [97.8 °F (36.6 °C)-99 °F (37.2 °C)] 98.3 °F (36.8 °C)  Pulse:  [] 112  Resp:  [15-20] 20  SpO2:  [96 %-100 %] 98 %  BP: (109-146)/(57-87) 146/87     Weight: 64.8 kg (142 lb 13.7 oz) (11/22/18 0557)  Body mass index is 25.31 kg/m².  Body surface area is 1.7 meters squared.    I/O last 3 completed shifts:  In: 1410 [P.O.:1410]  Out: 200 [Urine:200]    Physical Exam   Constitutional: She is oriented to person, place, and time. She appears well-developed. No distress.   Temporal and distal extremity muscle wasting   HENT:   Head: Normocephalic and atraumatic.   Eyes: EOM are normal. No scleral icterus.   Neck: Normal range of motion.   Cardiovascular: Normal rate and regular rhythm.   No murmur heard.  Pulmonary/Chest: Effort normal. No respiratory distress. She has no wheezes.   Abdominal: Bowel sounds are normal. She exhibits distension and ascites. There is tenderness. There is no guarding.   Chevron incision w/ steri strips, minimal serous leakage from R side   RLQ VALORIE drain sites c/d/i with sutures   Musculoskeletal: Normal  range of motion. She exhibits edema (Improving LE, ).   Neurological: She is alert and oriented to person, place, and time.   Skin: Skin is warm. She is not diaphoretic.   Psychiatric: She has a normal mood and affect.   Nursing note and vitals reviewed.    Interval History:   No acute events overnight. UOP 1050 ml UOP overnight fluid balance -450 ml yesterday. sCr very slowly improving. Heptic angiogram was h elp tou Doppler US of liver today. She feels well.     Review of patient's allergies indicates:  No Known Allergies  Current Facility-Administered Medications   Medication Frequency    acetaminophen tablet 650 mg Q6H PRN    aspirin chewable tablet 81 mg Daily    atovaquone suspension 1,500 mg Daily    bisacodyl suppository 10 mg Daily PRN    calcium carbonate 200 mg calcium (500 mg) chewable tablet 500 mg TID PRN    dextrose 50% injection 12.5 g PRN    dextrose 50% injection 25 g PRN    entecavir tablet 0.5 mg Every other day    furosemide tablet 80 mg Daily    glucagon (human recombinant) injection 1 mg PRN    glucose chewable tablet 16 g PRN    glucose chewable tablet 24 g PRN    heparin (porcine) injection 5,000 Units Q8H    insulin aspart U-100 pen 0-5 Units QID (AC + HS) PRN    insulin aspart U-100 pen 6-12 Units TIDWM    magnesium oxide tablet 800 mg BID    midodrine tablet 15 mg TID    mycophenolate capsule 500 mg BID    ondansetron disintegrating tablet 8 mg Q8H PRN    ondansetron injection 4 mg Q8H PRN    pantoprazole EC tablet 40 mg BID    polyethylene glycol packet 17 g Daily PRN    [START ON 11/24/2018] predniSONE tablet 5 mg Daily    Followed by    [START ON 12/1/2018] predniSONE tablet 2.5 mg Daily    sodium chloride 0.9% flush 3 mL PRN    tacrolimus capsule 0.5 mg BID    traMADol tablet 50 mg Q6H PRN    ursodiol capsule 300 mg BID       Objective:     Vital Signs (Most Recent):  Temp: 98.3 °F (36.8 °C) (11/23/18 1129)  Pulse: (!) 112 (11/23/18 1119)  Resp: 20  (11/23/18 1119)  BP: (!) 146/87 (11/23/18 1119)  SpO2: 98 % (11/23/18 1119)  O2 Device (Oxygen Therapy): room air (11/23/18 0850) Vital Signs (24h Range):  Temp:  [97.8 °F (36.6 °C)-99 °F (37.2 °C)] 98.3 °F (36.8 °C)  Pulse:  [] 112  Resp:  [15-20] 20  SpO2:  [96 %-100 %] 98 %  BP: (109-146)/(57-87) 146/87     Weight: 64.8 kg (142 lb 13.7 oz) (11/22/18 0557)  Body mass index is 25.31 kg/m².  Body surface area is 1.7 meters squared.    I/O last 3 completed shifts:  In: 1410 [P.O.:1410]  Out: 200 [Urine:200]    Physical Exam   Constitutional: She is oriented to person, place, and time. She appears well-developed. No distress.   Temporal and distal extremity muscle wasting   HENT:   Head: Normocephalic and atraumatic.   Eyes: EOM are normal. No scleral icterus.   Neck: Normal range of motion.   Cardiovascular: Normal rate and regular rhythm.   No murmur heard.  Pulmonary/Chest: Effort normal. No respiratory distress. She has no wheezes.   Abdominal: Bowel sounds are normal. She exhibits distension has some ascitis. There is tenderness. There is no guarding.   Chevron incision w/ steri strips, minimal serous leakage from R side   RLQ VALORIE drain sites c/d/i with sutures   Musculoskeletal: Normal range of motion. She exhibits edema (1+ BLE, + generalized).   Neurological: She is alert and oriented to person, place, and time.   Skin: Skin is warm. She is not diaphoretic.   Psychiatric: She has a normal mood and affect.   Nursing note and vitals reviewed.      Significant Labs:  CBC:   Recent Labs   Lab 11/23/18  0705   WBC 3.88*   RBC 2.94*   HGB 8.4*   HCT 27.0*   PLT 88*   MCV 92   MCH 28.6   MCHC 31.1*     CMP:   Recent Labs   Lab 11/23/18  0705   *   CALCIUM 8.6*   ALBUMIN 3.0*   PROT 4.9*   *   K 3.9   CO2 29   CL 93*   BUN 63*   CREATININE 1.4   ALKPHOS 83   ALT 5*   AST 12   BILITOT 1.3*     All labs within the past 24 hours have been reviewed.     Significant Imaging:  US Liver Transplant  Impression       Abnormally decreased main hepatic artery peak systolic velocity with slow arterial flow and abnormal tardus parvus waveforms intrahepatically.  Findings concerning for developing hepatic artery stenosis/rejection.  Recommend continued close follow-up with consideration of possible contrast enhanced triple-phase abdominal CT for further evaluation if clinically indicated.    Peritransplant fluid collection as above.    Grossly stable ascites.    Right-sided pleural effusion.    This report was flagged in Epic as abnormal.    Electronically signed by resident: Ciro Love  Date: 11/15/2018  Time: 10:19    Electronically signed by: Yvan Watkins MD  Date: 11/15/2018  Time: 10:29         Significant Labs:  CBC:   Recent Labs   Lab 11/23/18  0705   WBC 3.88*   RBC 2.94*   HGB 8.4*   HCT 27.0*   PLT 88*   MCV 92   MCH 28.6   MCHC 31.1*     CMP:   Recent Labs   Lab 11/23/18  0705   *   CALCIUM 8.6*   ALBUMIN 3.0*   PROT 4.9*   *   K 3.9   CO2 29   CL 93*   BUN 63*   CREATININE 1.4   ALKPHOS 83   ALT 5*   AST 12   BILITOT 1.3*     All labs within the past 24 hours have been reviewed.     Significant Imaging:  US Liver Transplant Impression       Abnormally decreased main hepatic artery peak systolic velocity with slow arterial flow and abnormal tardus parvus waveforms intrahepatically.  Findings concerning for developing hepatic artery stenosis/rejection.  Recommend continued close follow-up with consideration of possible contrast enhanced triple-phase abdominal CT for further evaluation if clinically indicated.    Peritransplant fluid collection as above.    Grossly stable ascites.    Right-sided pleural effusion.    This report was flagged in Epic as abnormal.    Electronically signed by resident: Ciro Love  Date: 11/15/2018  Time: 10:19    Electronically signed by: Yvan Watkins MD  Date: 11/15/2018  Time: 10:29         Assessment/Plan:      Post LT - Acute renal failure    Post-LT  EVA is multifactorial in origin and has been related to the severity of liver disease, toxicity of immunosuppressive therapy and hepatic ischaemia reperfusion injury might be a driving force in the aetiology of post-LT EVA.     Plan:  - Improving renal function with adequate recovery will need to establish new baseline  - Improved sCr 1.4 mg/dL suggest very good renal recovery  - Hepatic angiogram done 24 ml of hypo-osmotic contrast 11/19/2018, suspect collateral perfusion is adequate started ASA  - No uremic symptoms or signs.   - Patient's pitting LE edema improving  -  Will cont to trend Renal fx and electrolytes. Will monitor labs from a distance please feel free to call with Questions  - Continue furosemide at 80 mg PO BID dose, edema improving  - Accutate I/O's and daily standing am weights  - Will monitor labs from a distance tomorrow but please feel free to call with questions         Thank you for your consult. I will follow-up with patient. Please contact us if you have any additional questions.    Dakota Garcia MD  Nephrology  [facility]

## 2018-11-23 NOTE — SUBJECTIVE & OBJECTIVE
Scheduled Meds:   aspirin  81 mg Oral Daily    atovaquone  1,500 mg Oral Daily    entecavir  0.5 mg Oral Every other day    ertapenem (INVANZ) IVPB  1 g Intravenous Q24H    furosemide  80 mg Oral Daily    heparin (porcine)  5,000 Units Subcutaneous Q8H    insulin aspart U-100  6-12 Units Subcutaneous TIDWM    magnesium oxide  800 mg Oral BID    midodrine  15 mg Oral TID    mycophenolate  500 mg Oral BID    pantoprazole  40 mg Oral BID    [START ON 11/24/2018] predniSONE  5 mg Oral Daily    Followed by    [START ON 12/1/2018] predniSONE  2.5 mg Oral Daily    tacrolimus  0.5 mg Oral BID    ursodiol  300 mg Oral BID     Continuous Infusions:  PRN Meds:acetaminophen, bisacodyl, calcium carbonate, dextrose 50%, dextrose 50%, glucagon (human recombinant), glucose, glucose, insulin aspart U-100, ondansetron, ondansetron, polyethylene glycol, sodium chloride 0.9%, traMADol    Review of Systems   Constitutional: Positive for activity change, appetite change (improving) and fatigue. Negative for chills and fever.   HENT: Negative for congestion, facial swelling and trouble swallowing.    Eyes: Negative for pain, discharge and visual disturbance.   Respiratory: Negative for cough, chest tightness, shortness of breath and wheezing.    Cardiovascular: Positive for leg swelling. Negative for chest pain and palpitations.   Gastrointestinal: Positive for abdominal distention and abdominal pain. Negative for constipation, diarrhea, nausea and vomiting.   Endocrine: Negative.    Genitourinary: Negative for decreased urine volume, difficulty urinating and dysuria.   Musculoskeletal: Positive for back pain. Negative for arthralgias, neck pain and neck stiffness.   Skin: Positive for wound.   Allergic/Immunologic: Positive for immunocompromised state.   Neurological: Positive for weakness. Negative for tremors and headaches.   Psychiatric/Behavioral: Negative for confusion and sleep disturbance.     Objective:     Vital  Signs (Most Recent):  Temp: 98.3 °F (36.8 °C) (11/23/18 1129)  Pulse: (!) 112 (11/23/18 1119)  Resp: 20 (11/23/18 1119)  BP: (!) 146/87 (11/23/18 1119)  SpO2: 98 % (11/23/18 1119) Vital Signs (24h Range):  Temp:  [97.8 °F (36.6 °C)-99 °F (37.2 °C)] 98.3 °F (36.8 °C)  Pulse:  [] 112  Resp:  [15-20] 20  SpO2:  [96 %-100 %] 98 %  BP: (109-146)/(57-87) 146/87     Weight: 64.8 kg (142 lb 13.7 oz)  Body mass index is 25.31 kg/m².    Intake/Output - Last 3 Shifts       11/21 0700 - 11/22 0659 11/22 0700 - 11/23 0659 11/23 0700 - 11/24 0659    P.O. 300 1110     I.V. (mL/kg)       Blood 192.5      Total Intake(mL/kg) 492.5 (7.6) 1110 (17.1)     Urine (mL/kg/hr)  200 (0.1)     Emesis/NG output  0     Other 4000 0     Stool  0     Total Output 4000 200     Net -3507.5 +910            Urine Occurrence 2 x 5 x     Stool Occurrence 1 x 4 x     Emesis Occurrence  0 x           Physical Exam   Constitutional: She is oriented to person, place, and time. She appears well-developed. No distress.   Temporal and distal extremity muscle wasting   HENT:   Head: Normocephalic and atraumatic.   Eyes: EOM are normal. No scleral icterus.   Neck: Normal range of motion.   Cardiovascular: Normal rate, regular rhythm and normal heart sounds.   No murmur heard.  Pulmonary/Chest: Effort normal. No respiratory distress. She has no wheezes.   Breath sounds decreased at bases   Abdominal: Soft. Bowel sounds are normal. She exhibits distension. There is tenderness. There is no guarding.   Purulent drainage expressed from middle chevron incision + R lateral side, both areas with packing in place.  Minimal tissue erythema  RLQ VALORIE drain sites c/d/i with sutures   Musculoskeletal: Normal range of motion. She exhibits edema (2+ BLE, + generalized).   Neurological: She is alert and oriented to person, place, and time.   Skin: Skin is warm. She is not diaphoretic.   Psychiatric: She has a normal mood and affect.   Nursing note and vitals  reviewed.      Laboratory:  Immunosuppressants         Stop Route Frequency     tacrolimus capsule 0.5 mg      -- Oral 2 times daily     mycophenolate capsule 500 mg      -- Oral 2 times daily        CBC:   Recent Labs   Lab 11/23/18  0705   WBC 3.88*   RBC 2.94*   HGB 8.4*   HCT 27.0*   PLT 88*   MCV 92   MCH 28.6   MCHC 31.1*     CMP:   Recent Labs   Lab 11/23/18  0705   *   CALCIUM 8.6*   ALBUMIN 3.0*   PROT 4.9*   *   K 3.9   CO2 29   CL 93*   BUN 63*   CREATININE 1.4   ALKPHOS 83   ALT 5*   AST 12   BILITOT 1.3*     Labs within the past 24 hours have been reviewed.    Diagnostic Results:  I have personally reviewed all pertinent imaging studies.

## 2018-11-24 PROBLEM — R23.9 ALTERATION IN SKIN INTEGRITY DUE TO MOISTURE: Status: RESOLVED | Noted: 2018-11-23 | Resolved: 2018-11-24

## 2018-11-24 PROBLEM — R23.9 ALTERATION IN SKIN INTEGRITY RELATED TO SURGICAL INCISION: Status: ACTIVE | Noted: 2018-11-24

## 2018-11-24 LAB
ALBUMIN SERPL BCP-MCNC: 2.9 G/DL
ALP SERPL-CCNC: 90 U/L
ALT SERPL W/O P-5'-P-CCNC: 6 U/L
ANION GAP SERPL CALC-SCNC: 13 MMOL/L
AST SERPL-CCNC: 13 U/L
BASOPHILS # BLD AUTO: 0.02 K/UL
BASOPHILS NFR BLD: 0.5 %
BILIRUB SERPL-MCNC: 1.3 MG/DL
BLD PROD TYP BPU: NORMAL
BLD PROD TYP BPU: NORMAL
BLOOD UNIT EXPIRATION DATE: NORMAL
BLOOD UNIT EXPIRATION DATE: NORMAL
BLOOD UNIT TYPE CODE: 6200
BLOOD UNIT TYPE CODE: 6200
BLOOD UNIT TYPE: NORMAL
BLOOD UNIT TYPE: NORMAL
BUN SERPL-MCNC: 58 MG/DL
CALCIUM SERPL-MCNC: 8.6 MG/DL
CHLORIDE SERPL-SCNC: 95 MMOL/L
CO2 SERPL-SCNC: 27 MMOL/L
CODING SYSTEM: NORMAL
CODING SYSTEM: NORMAL
CREAT SERPL-MCNC: 1.2 MG/DL
DIFFERENTIAL METHOD: ABNORMAL
DISPENSE STATUS: NORMAL
DISPENSE STATUS: NORMAL
EOSINOPHIL # BLD AUTO: 0 K/UL
EOSINOPHIL NFR BLD: 0.5 %
ERYTHROCYTE [DISTWIDTH] IN BLOOD BY AUTOMATED COUNT: 16.5 %
EST. GFR  (AFRICAN AMERICAN): 53.3 ML/MIN/1.73 M^2
EST. GFR  (NON AFRICAN AMERICAN): 46.2 ML/MIN/1.73 M^2
GLUCOSE SERPL-MCNC: 149 MG/DL
HCT VFR BLD AUTO: 26.2 %
HGB BLD-MCNC: 8.2 G/DL
IMM GRANULOCYTES # BLD AUTO: 0.09 K/UL
IMM GRANULOCYTES NFR BLD AUTO: 2.4 %
LYMPHOCYTES # BLD AUTO: 0.2 K/UL
LYMPHOCYTES NFR BLD: 5.1 %
MAGNESIUM SERPL-MCNC: 1.6 MG/DL
MCH RBC QN AUTO: 29.1 PG
MCHC RBC AUTO-ENTMCNC: 31.3 G/DL
MCV RBC AUTO: 93 FL
MONOCYTES # BLD AUTO: 0.3 K/UL
MONOCYTES NFR BLD: 6.8 %
NEUTROPHILS # BLD AUTO: 3.1 K/UL
NEUTROPHILS NFR BLD: 84.7 %
NRBC BLD-RTO: 0 /100 WBC
PLATELET # BLD AUTO: 93 K/UL
PMV BLD AUTO: 9.6 FL
POCT GLUCOSE: 158 MG/DL (ref 70–110)
POCT GLUCOSE: 223 MG/DL (ref 70–110)
POCT GLUCOSE: 237 MG/DL (ref 70–110)
POCT GLUCOSE: 240 MG/DL (ref 70–110)
POTASSIUM SERPL-SCNC: 3.3 MMOL/L
PROT SERPL-MCNC: 5 G/DL
RBC # BLD AUTO: 2.82 M/UL
SODIUM SERPL-SCNC: 135 MMOL/L
TACROLIMUS BLD-MCNC: 2.9 NG/ML
TRANS ERYTHROCYTES VOL PATIENT: NORMAL ML
TRANS ERYTHROCYTES VOL PATIENT: NORMAL ML
WBC # BLD AUTO: 3.7 K/UL

## 2018-11-24 PROCEDURE — 25000003 PHARM REV CODE 250: Performed by: PHYSICIAN ASSISTANT

## 2018-11-24 PROCEDURE — 94761 N-INVAS EAR/PLS OXIMETRY MLT: CPT

## 2018-11-24 PROCEDURE — 25000003 PHARM REV CODE 250: Performed by: NURSE PRACTITIONER

## 2018-11-24 PROCEDURE — 97607 NEG PRS WND THR NDME<=50SQCM: CPT

## 2018-11-24 PROCEDURE — 99900035 HC TECH TIME PER 15 MIN (STAT)

## 2018-11-24 PROCEDURE — 63600175 PHARM REV CODE 636 W HCPCS: Performed by: PHYSICIAN ASSISTANT

## 2018-11-24 PROCEDURE — 20600001 HC STEP DOWN PRIVATE ROOM

## 2018-11-24 PROCEDURE — 36415 COLL VENOUS BLD VENIPUNCTURE: CPT

## 2018-11-24 PROCEDURE — 80197 ASSAY OF TACROLIMUS: CPT

## 2018-11-24 PROCEDURE — 80053 COMPREHEN METABOLIC PANEL: CPT

## 2018-11-24 PROCEDURE — 94664 DEMO&/EVAL PT USE INHALER: CPT

## 2018-11-24 PROCEDURE — 25000003 PHARM REV CODE 250: Performed by: TRANSPLANT SURGERY

## 2018-11-24 PROCEDURE — 99233 SBSQ HOSP IP/OBS HIGH 50: CPT | Mod: 24,,, | Performed by: NURSE PRACTITIONER

## 2018-11-24 PROCEDURE — 63600175 PHARM REV CODE 636 W HCPCS: Performed by: NURSE PRACTITIONER

## 2018-11-24 PROCEDURE — 83735 ASSAY OF MAGNESIUM: CPT

## 2018-11-24 PROCEDURE — 85025 COMPLETE CBC W/AUTO DIFF WBC: CPT

## 2018-11-24 RX ORDER — DIPHENHYDRAMINE HYDROCHLORIDE 50 MG/ML
INJECTION INTRAMUSCULAR; INTRAVENOUS
Status: DISCONTINUED
Start: 2018-11-24 | End: 2018-11-24 | Stop reason: WASHOUT

## 2018-11-24 RX ORDER — POTASSIUM CHLORIDE 20 MEQ/1
40 TABLET, EXTENDED RELEASE ORAL
Status: DISCONTINUED | OUTPATIENT
Start: 2018-11-24 | End: 2018-11-24

## 2018-11-24 RX ORDER — POTASSIUM CHLORIDE 20 MEQ/15ML
40 SOLUTION ORAL
Status: COMPLETED | OUTPATIENT
Start: 2018-11-24 | End: 2018-11-24

## 2018-11-24 RX ORDER — TACROLIMUS 1 MG/1
1 CAPSULE ORAL 2 TIMES DAILY
Status: DISCONTINUED | OUTPATIENT
Start: 2018-11-24 | End: 2018-12-02

## 2018-11-24 RX ADMIN — MAGNESIUM OXIDE TAB 400 MG (241.3 MG ELEMENTAL MG) 800 MG: 400 (241.3 MG) TAB at 09:11

## 2018-11-24 RX ADMIN — HEPARIN SODIUM 5000 UNITS: 5000 INJECTION, SOLUTION INTRAVENOUS; SUBCUTANEOUS at 08:11

## 2018-11-24 RX ADMIN — ATOVAQUONE 1500 MG: 750 SUSPENSION ORAL at 09:11

## 2018-11-24 RX ADMIN — INSULIN ASPART 12 UNITS: 100 INJECTION, SOLUTION INTRAVENOUS; SUBCUTANEOUS at 05:11

## 2018-11-24 RX ADMIN — POTASSIUM CHLORIDE 40 MEQ: 20 SOLUTION ORAL at 03:11

## 2018-11-24 RX ADMIN — PANTOPRAZOLE SODIUM 40 MG: 40 TABLET, DELAYED RELEASE ORAL at 08:11

## 2018-11-24 RX ADMIN — POTASSIUM CHLORIDE 40 MEQ: 20 SOLUTION ORAL at 12:11

## 2018-11-24 RX ADMIN — HEPARIN SODIUM 5000 UNITS: 5000 INJECTION, SOLUTION INTRAVENOUS; SUBCUTANEOUS at 06:11

## 2018-11-24 RX ADMIN — TACROLIMUS 0.5 MG: 0.5 CAPSULE ORAL at 09:11

## 2018-11-24 RX ADMIN — URSODIOL 300 MG: 300 CAPSULE ORAL at 08:11

## 2018-11-24 RX ADMIN — INSULIN ASPART 1 UNITS: 100 INJECTION, SOLUTION INTRAVENOUS; SUBCUTANEOUS at 09:11

## 2018-11-24 RX ADMIN — MAGNESIUM OXIDE TAB 400 MG (241.3 MG ELEMENTAL MG) 800 MG: 400 (241.3 MG) TAB at 08:11

## 2018-11-24 RX ADMIN — HEPARIN SODIUM 5000 UNITS: 5000 INJECTION, SOLUTION INTRAVENOUS; SUBCUTANEOUS at 03:11

## 2018-11-24 RX ADMIN — ENTECAVIR 0.5 MG: 0.5 TABLET ORAL at 09:11

## 2018-11-24 RX ADMIN — PANTOPRAZOLE SODIUM 40 MG: 40 TABLET, DELAYED RELEASE ORAL at 09:11

## 2018-11-24 RX ADMIN — TACROLIMUS 1 MG: 1 CAPSULE ORAL at 05:11

## 2018-11-24 RX ADMIN — ERTAPENEM SODIUM 1 G: 1 INJECTION, POWDER, LYOPHILIZED, FOR SOLUTION INTRAMUSCULAR; INTRAVENOUS at 03:11

## 2018-11-24 RX ADMIN — INSULIN ASPART 12 UNITS: 100 INJECTION, SOLUTION INTRAVENOUS; SUBCUTANEOUS at 12:11

## 2018-11-24 RX ADMIN — FUROSEMIDE 80 MG: 40 TABLET ORAL at 09:11

## 2018-11-24 RX ADMIN — URSODIOL 300 MG: 300 CAPSULE ORAL at 09:11

## 2018-11-24 RX ADMIN — INSULIN ASPART 12 UNITS: 100 INJECTION, SOLUTION INTRAVENOUS; SUBCUTANEOUS at 09:11

## 2018-11-24 RX ADMIN — INSULIN ASPART 2 UNITS: 100 INJECTION, SOLUTION INTRAVENOUS; SUBCUTANEOUS at 05:11

## 2018-11-24 RX ADMIN — PREDNISONE 5 MG: 5 TABLET ORAL at 09:11

## 2018-11-24 RX ADMIN — ASPIRIN 81 MG CHEWABLE TABLET 81 MG: 81 TABLET CHEWABLE at 09:11

## 2018-11-24 NOTE — ASSESSMENT & PLAN NOTE
- PT/OT consulted.  Progressing well w therapy.  - Recommend home health PT and rolling walker at discharge (orders placed 11/6).

## 2018-11-24 NOTE — PROGRESS NOTES
[facility]  Liver Transplant  Progress Note    Patient Name: Scarlett Reddy  MRN: [id number]  Admission Date: 10/1/2018  Hospital Length of Stay: 53 days  Code Status: Full Code  Primary Care Provider: Primary Doctor No  Post-Operative Day: 50    ORGAN:   LIVER  Disease Etiology: Cirrhosis: Type B and D  Donor Type:    - Brain Death  CDC High Risk:   No  Donor CMV Status:   Donor CMV Status: Positive  Donor HBcAB:   Negative  Donor HCV Status:   Negative  Donor HBV SETH: Negative  Donor HCV SETH: Negative  Whole or Partial: Whole Liver  Biliary Anastomosis: End to End  Arterial Anatomy: Replaced Left Hepatic and Right Hepatic  Subjective:     History of Present Illness:  Ms. Reddy is a 69 y/o female with PMH of ESLD secondary Hep B and D.  Listed for liver transplant with MELD 23.  Paracentesis 10/1 with 5L removed, no fluid sent for analysis.  Morning labs significant for hyponatremia, Na 119.  Pt admitted to LTS for sodium monitoring.        Hospital Course:  Hospital Course: 69 y/o F h/o HBV/delta cirrhosis s/p DBDLT 10/5/18 (CMV D+/R+, steroid induction, MMF/tacro/pred maintenance) with take back on 10/6 2/2 hyperbilirubinema and bilious VALORIE drainage, no leak identified. Post-op course c/b hypercapneic and hypoxic respiratory failure and was reintubated though quickly extubated now doing well. Liver bx (10/6) sig for Zone 3 hemorrhage and collapse, in addition to cholestasis. Transferred from ICU on POD #4. ID consulted to comment on positive diphtheroid culture from ascitic fluid (likely skin colonization) as well as ESBL Klebsiella pneumoniae in urine culture (10/4).  Pt asymptomatic and cell count from ascitic fluid unremarkable. Per ID recs, no need to treat diphtheroids or asymptomatic ESBL Kleb pneumo bacteriuria though pt has had 6 days of therapy which would cover these organisms. Mild peritransplant ascites- repeat US 10/6 with increased arterial resistive indices. Repeat US 10/9  w/ continued elevation in RIs and fluid collections. LFTs trending down nicely.     Acute gradual worsening of renal function s/p OLTx. Creatinine on arrival 0.8 and has been up trending. Nephrology consulted for post-op EVA. Cr cont to trend up but remains to have good UOP. Had stable response to lasiz diuresis.  BUN elevated requiring multiple rounds of dialysis (10/15, 10/17, 10/20, 10/31). No significant improvements in kidney function noted.   In addition, pt H/H cont to fluctuate requiring multiple blood transfusions (10/14, 10/16, 10/18, 10/19, 10/22, 10/27, 10/29).  Pt reported dark stools 10/15 but color has normalized. FOBT+. EGD 10/18 consistent with ulcerated mucosa in duodenum s/p coagulation. Concern for H. Pylori. Started Amoxicillin/levofloxacin (10/19, complete 11/2)). Remains on Protonix 40 mg BID. Biopsy negative for infection and CMV. Will cont close monitoring H/H, transfuse as needed with goal Hb > 7.0. Of note, Urine cx + Klebsiella pneumoniae (10/13). Received 3 day course of ciprofloxacin, dc'd 10/19.   On 10/19 pm pt c/o crushing chest pain. Troponin 0.094, likely 2/2 ischemic stress. EKG NSR.   Liver US 11/6 showed no arterial flow. Paracentesis 11/7 with 8.9 L removed. Wbc 39, segs 16%. Gram stain no organisms seen + no WBC. Pt feels better since having fluid removed but she continues to have worsening abdominal distention & LE edema.Repeat Liver US 11/8 showed arterial flow with increased RIs. Paracentesis 11/12 with 8.6L removed (WBC 34, segs 25%). 11/15 liver u/s showed abnormally decreased main hepatic artery peak systolic velocity with slow arterial flow and abnormal tardus parvus waveforms intrahepatically. Consulted vascular surgery. Repeat liver US 11/19 showed no arterial flow on R and sluggish flow on L. Hepatic angiogram 11/19 confirmed HAT.  Friday 11/16, purulent drainage noted from middle of chevron incision. Area opened and packed. Cx grew klebsiella p. CT scan showed no  signs of infection. On Monday 11/18, it was noted that the right/lateral side of the chevron had 3 small openings draining purulent fluid. Opened the area and packed. Wound care following.    Interval history: Increased drainage from right end and mid chevron.  Wd care notified and placed wd vac.  Tolerating diet.  Denies diarrhea.  Ambulating w/o difficulty. Urine cx >100,000 GNR.  Treating w Ertapenem per ID.  Cr cont to improve daily.  Cont lasix 80 mg qd.  Continue to monitor. Possible d/c early next week.    Scheduled Meds:   aspirin  81 mg Oral Daily    atovaquone  1,500 mg Oral Daily    entecavir  0.5 mg Oral Every other day    ertapenem (INVANZ) IVPB  1 g Intravenous Q24H    furosemide  80 mg Oral Daily    heparin (porcine)  5,000 Units Subcutaneous Q8H    insulin aspart U-100  6-12 Units Subcutaneous TIDWM    magnesium oxide  800 mg Oral BID    midodrine  15 mg Oral TID    pantoprazole  40 mg Oral BID    potassium chloride  40 mEq Oral Q2H    predniSONE  5 mg Oral Daily    Followed by    [START ON 12/1/2018] predniSONE  2.5 mg Oral Daily    tacrolimus  1 mg Oral BID    ursodiol  300 mg Oral BID     Continuous Infusions:  PRN Meds:acetaminophen, bisacodyl, calcium carbonate, dextrose 50%, dextrose 50%, glucagon (human recombinant), glucose, glucose, insulin aspart U-100, ondansetron, ondansetron, polyethylene glycol, sodium chloride 0.9%, traMADol    Review of Systems   Constitutional: Positive for activity change, appetite change (improving) and fatigue. Negative for chills and fever.   HENT: Negative for congestion, facial swelling and trouble swallowing.    Eyes: Negative for pain, discharge and visual disturbance.   Respiratory: Negative for cough, chest tightness, shortness of breath and wheezing.    Cardiovascular: Positive for leg swelling. Negative for chest pain and palpitations.   Gastrointestinal: Positive for abdominal distention and abdominal pain. Negative for constipation,  diarrhea, nausea and vomiting.   Endocrine: Negative.    Genitourinary: Negative for decreased urine volume, difficulty urinating and dysuria.   Musculoskeletal: Positive for back pain. Negative for arthralgias, neck pain and neck stiffness.   Skin: Positive for wound.   Allergic/Immunologic: Positive for immunocompromised state.   Neurological: Positive for weakness. Negative for tremors and headaches.   Psychiatric/Behavioral: Negative for confusion and sleep disturbance.     Objective:     Vital Signs (Most Recent):  Temp: 97.7 °F (36.5 °C) (11/24/18 1114)  Pulse: (!) 117 (11/24/18 1130)  Resp: 19 (11/24/18 1130)  BP: 136/82 (11/24/18 1130)  SpO2: 98 % (11/24/18 1130) Vital Signs (24h Range):  Temp:  [97.7 °F (36.5 °C)-98.5 °F (36.9 °C)] 97.7 °F (36.5 °C)  Pulse:  [] 117  Resp:  [11-19] 19  SpO2:  [93 %-100 %] 98 %  BP: (118-136)/(68-82) 136/82     Weight: 58.4 kg (128 lb 13.7 oz)(standing)  Body mass index is 22.83 kg/m².    Intake/Output - Last 3 Shifts       11/22 0700 - 11/23 0659 11/23 0700 - 11/24 0659 11/24 0700 - 11/25 0659    P.O. 1110 900     Blood       IV Piggyback  100     Total Intake(mL/kg) 1110 (17.1) 1000 (17.1)     Urine (mL/kg/hr) 200 (0.1) 400 (0.3)     Emesis/NG output 0 0     Other 0 0     Stool 0 0     Blood  0     Total Output 200 400     Net +910 +600            Urine Occurrence 5 x 6 x     Stool Occurrence 4 x 3 x     Emesis Occurrence 0 x 0 x           Physical Exam   Constitutional: She is oriented to person, place, and time. She appears well-developed. No distress.   Temporal and distal extremity muscle wasting   HENT:   Head: Normocephalic and atraumatic.   Eyes: EOM are normal. No scleral icterus.   Neck: Normal range of motion.   Cardiovascular: Normal rate, regular rhythm and normal heart sounds.   No murmur heard.  Pulmonary/Chest: Effort normal. No respiratory distress. She has no wheezes.   Breath sounds decreased at bases   Abdominal: Soft. Bowel sounds are normal. She  "exhibits distension. There is tenderness. There is no guarding.   Purulent drainage expressed from middle chevron incision + R lateral side, both areas with packing in place.  Minimal tissue erythema  RLQ VALORIE drain sites c/d/i with sutures   Musculoskeletal: Normal range of motion. She exhibits edema (2+ BLE, + generalized).   Neurological: She is alert and oriented to person, place, and time.   Skin: Skin is warm. She is not diaphoretic.   Psychiatric: She has a normal mood and affect.   Nursing note and vitals reviewed.      Laboratory:  Immunosuppressants         Stop Route Frequency     tacrolimus capsule 1 mg      -- Oral 2 times daily        CBC:   Recent Labs   Lab 11/24/18  0649   WBC 3.70*   RBC 2.82*   HGB 8.2*   HCT 26.2*   PLT 93*   MCV 93   MCH 29.1   MCHC 31.3*     CMP:   Recent Labs   Lab 11/24/18  0649   *   CALCIUM 8.6*   ALBUMIN 2.9*   PROT 5.0*   *   K 3.3*   CO2 27   CL 95   BUN 58*   CREATININE 1.2   ALKPHOS 90   ALT 6*   AST 13   BILITOT 1.3*     Labs within the past 24 hours have been reviewed.    Diagnostic Results:  I have personally reviewed all pertinent imaging studies.    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   - POD 1 US: increased arterial resistive indices, suggestive of edema vs acute rejection vs congestion.  - Repeat US 10/9 with continued elevation of RIs.  - LFTs stable.  - T bili trending down.   - Repeat liver US (10/11): elevated RIs.  - Liver US 11/6 to assess for ascites. No arterial flow within the liver allograft concerning for interval thrombosis/occlusion of the arterial system. Severe ascites seen.  - Liver US 11/8: arterial flow seen w/ elevated RIs   - LFTs remain stable. No CTA at this time due to EVA and normal liver function.   - Liver US 11/15 showed "abnormally decreased main " "hepatic artery peak systolic velocity with slow arterial flow and abnormal tardus parvus waveforms intrahepatically."   -Vascular surgery consulted  - Liver US 11/19 showed no arterial flow on the right and sluggish flow on the left.   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 No further intervention planned at this time as patient's LFTs are stable. Will monitor.         Hypokalemia    - K-lyte 50 mg qd given 11/20 & 11/21.  - Continue to monitor & replace as needed.     Hypophosphatemia    - Continue to monitor & replace as needed.       Delayed surgical wound healing    - Middle chevron incision opened 11/16 with purulent drainage  - Packed with Aquacel ag at this time. Growing Klebsiella p.   - CT abd/pelvis 11/17- no signs of infection  - 11/17: R side of chevron incision w/ 3 small holes draining purulent fluid, opened and packed w/ aquacel.   - Wound care following.  - wd w increased drainage today.  Wd care notified and wd vac placed.  Apprec recs.          Hepatic artery thrombosis of transplanted liver    - Liver US 11/19 showed no arterial flow to R and sluggish flow to L  - Hepatic angiogram 11/19 confirmed HAT  - No further intervention planned at this time. Will continue to monitor.     Thrombocytopenia, unspecified    - Improving.  - Monitor.       Alteration in skin integrity    - Monitor.       Leukopenia    - Stopped Cellcept, Bactrim, and Valcyte 10/31 2/2 leukopenia  - Started Atovaquone 11/6.   - CMV PCR noted to be positive at 129. Will hold off on treatment with valcyte at this time.  - Continue to monitor.       Azotemia    - CRRT 10/31 for uremic toxin clearance.           UTI (urinary tract infection)    - Urine Cx 10/15 w/ Klebsiella pneumonia.  - Started Ciprofloxacin (10/17-10/19).   - Started on amoxicillin/levo for H. Pylori (completed abx 11/2).  - Pt c/o lower back pain 11/22. UA w/ trace leuks, moderate wbs, 1+ blood, cx pending  - Pt symptomatic & with ESBL in urine " "previously.  Ertapenem 1g QD x 3 days (started 11/23), per ID recs        Acute blood loss anemia    - H&H cont to fluctuate.   - FOBT +.  - EGD 10/18 - showed diffuse bleeding with ulcerated duodenal mucosa.  - EGD 11/5 - improvement seen, no active bleeding.  - Resume heparin sq injections 11/6.   - Decrease heparin sq from TID to BID 11/15   - Heparin dc'd 11/17  - See "anemia requiring transfusions."  - Continue to monitor.             Post LT - Acute renal failure    - Creatinine on arrival 0.8 and trended up.  - Nephrology consulted.  - Renal US (10/11): no hydronephrosis.  - Echo 10/12: diastolic dysfunction, likely 2/2 to ARF.  - CRRT 10/14; SLED 10/17, 10/20. --> no improvements noted in kidney fxn.  - 24 hr urine creatine collection: CrCl of 7.   - CRRT 10/31 with 1L removed.   - See "hypervolemia associated with renal insufficiency"     Hypervolemia associated with renal insufficiency    - CRRT 10/14, 10/17, 10/20, 10/31.  - CXR 10/30: abundant fluid in b/l pleural space.  - Continue 80 mg Lasix QD.  - Monitor daily weights.       EVA (acute kidney injury)    - No plans for RRT in near future, per nephrology  - Trialysis line removed 11/13  - Nephrology involved. Appreciate recs.  - Cr improving daily.   Monitor.     At risk for opportunistic infections    - Valcyte for CMV prophylaxis--> holding (10/31) for leukopenia. Pt will need weekly CMV PCR while discontinued.  - CMV PCR noted to be positive at 129. .  - CMV PCR 11/8: 105  - CMV PCR 11/15 with 89 copies  - CMV PCR 11/23 pending  - Bactrim for PCP prophylaxis (MW).--> holding (10/31) for leukopenia.   - Start Atovaquone 11/6.  - Fluconazole for fungal prophylaxis (first dose 10/6). D/c Fluc 11/16 bc pt had been on it for > 30 days post take back.       Long-term use of immunosuppressant medication    - Prograf: stopped 10/29. Resumed 11/2.  --> Will monitor for signs of toxic side effects, check daily troughs, and change meds accordingly.   - " Prednisone: stopped 10/29. Started on Hydrocortisone. pred taper resumed.  - Cellcept: stopped 10/31 for leukopenia. Restarted Cellcept 500 mg bid, 11/6.         Prophylactic immunotherapy    - See long term use immunosuppresion.       Ascites    - Paracentesis 10/1, 5L removed, no fluid sent for analysis.  - Pt remains volume overloaded.   - Liver US 11/6 with severe ascites.   - Paracentesis 11/7 with 8.9 L removed. Wbc 39, segs 16%. Gram stain - no organisms seen and no WBC. Cultures NGTD.  - Paracentesis 11/12: 8.6 L removed. 34 wbc, 25% segs, 33 lymphs, 42 monocytes/macrophages. Cultures NGTD  - Paracentesis 11/21 with 4 L removed, wbc neg for infection.     Weakness    - See physical deconditioning.       Physical deconditioning    - PT/OT consulted.  Progressing well w therapy.  - Recommend home health PT and rolling walker at discharge (orders placed 11/6).       Hypotension    - continue Midodrine 15 mg TID. Monitor       Anemia requiring transfusions    - Fluctuating H&H.    - Transfusions: 10/14; 10/16; 10/18; 10/19; 10/22; 10/27; 10/29, 10/31, 11/3, 11/21  - FOBT +  - LDH elevated, Hapto < 10.  - EGD 10/18: ulcerated duodenal mucosa.   - Consulted GI about this ongoing issue and further need of another EGD.  - EGD 11/5 unremarkable.   - Iron studies 11/13:  95, TIBC 107, sat iron 89, transferrin 72, ferritin pending  - Hemolytic anemia workup 11/13: Haptoglobin 132, retic 3.9   - Last transfused 1upRBC 11/21. H/H now improved.   - Will continue to monitor closely.           Severe malnutrition    - Dietary consulted.   - Temporal and distal extremity muscle wasting and edema.  - Encourage supplements and to increase nutritional intake.  - Per nephrology, pt to be on low protein diet.   - Prealbumin reviewed.   - tolerating diet.  Denies n/v.       Type 2 diabetes mellitus with diabetic polyneuropathy, with long-term current use of insulin    - Continue home regimen.  - Endocrine consulted.  - Pt  off insulin drip at this time.  Apprec endo recs.        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  - Tx w/ Entecavir- dose changed to q72 hours given renal function.             VTE Risk Mitigation (From admission, onward)        Ordered     heparin (porcine) injection 5,000 Units  Every 8 hours      11/22/18 1014     IP VTE HIGH RISK PATIENT  Once      11/05/18 1145     Place sequential compression device  Until discontinued      10/05/18 0709          The patients clinical status was discussed at multidisplinary rounds, involving transplant surgery, transplant medicine, pharmacy, nursing, nutrition, and social work    Discharge Planning:  D/c possible early next week.    Meghna Maldonado NP  Liver Transplant  [facility]

## 2018-11-24 NOTE — ASSESSMENT & PLAN NOTE
BG goal 140-180.       Low dose correction scale given kidney function.  BG monitoring AC/HS  novolog 7 units with meals if BG <120 (or call endocrine) or 14 units with meals if >120.     Start self/caregiver administration of insulin to review for home.     Discharge planning:  TBD

## 2018-11-24 NOTE — ASSESSMENT & PLAN NOTE
- Dietary consulted.   - Temporal and distal extremity muscle wasting and edema.  - Encourage supplements and to increase nutritional intake.  - Per nephrology, pt to be on low protein diet.   - Prealbumin reviewed.   - tolerating diet.  Denies n/v.

## 2018-11-24 NOTE — ASSESSMENT & PLAN NOTE
- No plans for RRT in near future, per nephrology  - Trialysis line removed 11/13  - Nephrology involved. Appreciate recs.  - Cr improving daily.   Monitor.

## 2018-11-24 NOTE — PLAN OF CARE
Problem: Patient Care Overview  Goal: Plan of Care Review  Outcome: Ongoing (interventions implemented as appropriate)  Pt AAOx4, VSS, afebrile.  Currently denies c/o pain.   remain at pt bedside and active with patient care.  Fall risk precautions initiated.  Pt in lowest bed position setting, lighting adjusted, pt to wear nonskid socks when ambulating, side rails up x2.  Pt remain free from falls during shift.  Pt verbalize understanding to call when needed assistance.  Call light within reach.  Will continue to monitor.

## 2018-11-24 NOTE — ASSESSMENT & PLAN NOTE
Avoid insulin stacking and hypoglycemia.  Lab Results   Component Value Date    CREATININE 1.2 11/24/2018

## 2018-11-24 NOTE — SUBJECTIVE & OBJECTIVE
Scheduled Meds:   aspirin  81 mg Oral Daily    atovaquone  1,500 mg Oral Daily    entecavir  0.5 mg Oral Every other day    ertapenem (INVANZ) IVPB  1 g Intravenous Q24H    furosemide  80 mg Oral Daily    heparin (porcine)  5,000 Units Subcutaneous Q8H    insulin aspart U-100  6-12 Units Subcutaneous TIDWM    magnesium oxide  800 mg Oral BID    midodrine  15 mg Oral TID    pantoprazole  40 mg Oral BID    potassium chloride  40 mEq Oral Q2H    predniSONE  5 mg Oral Daily    Followed by    [START ON 12/1/2018] predniSONE  2.5 mg Oral Daily    tacrolimus  1 mg Oral BID    ursodiol  300 mg Oral BID     Continuous Infusions:  PRN Meds:acetaminophen, bisacodyl, calcium carbonate, dextrose 50%, dextrose 50%, glucagon (human recombinant), glucose, glucose, insulin aspart U-100, ondansetron, ondansetron, polyethylene glycol, sodium chloride 0.9%, traMADol    Review of Systems   Constitutional: Positive for activity change, appetite change (improving) and fatigue. Negative for chills and fever.   HENT: Negative for congestion, facial swelling and trouble swallowing.    Eyes: Negative for pain, discharge and visual disturbance.   Respiratory: Negative for cough, chest tightness, shortness of breath and wheezing.    Cardiovascular: Positive for leg swelling. Negative for chest pain and palpitations.   Gastrointestinal: Positive for abdominal distention and abdominal pain. Negative for constipation, diarrhea, nausea and vomiting.   Endocrine: Negative.    Genitourinary: Negative for decreased urine volume, difficulty urinating and dysuria.   Musculoskeletal: Positive for back pain. Negative for arthralgias, neck pain and neck stiffness.   Skin: Positive for wound.   Allergic/Immunologic: Positive for immunocompromised state.   Neurological: Positive for weakness. Negative for tremors and headaches.   Psychiatric/Behavioral: Negative for confusion and sleep disturbance.     Objective:     Vital Signs (Most  Recent):  Temp: 97.7 °F (36.5 °C) (11/24/18 1114)  Pulse: (!) 117 (11/24/18 1130)  Resp: 19 (11/24/18 1130)  BP: 136/82 (11/24/18 1130)  SpO2: 98 % (11/24/18 1130) Vital Signs (24h Range):  Temp:  [97.7 °F (36.5 °C)-98.5 °F (36.9 °C)] 97.7 °F (36.5 °C)  Pulse:  [] 117  Resp:  [11-19] 19  SpO2:  [93 %-100 %] 98 %  BP: (118-136)/(68-82) 136/82     Weight: 58.4 kg (128 lb 13.7 oz)(standing)  Body mass index is 22.83 kg/m².    Intake/Output - Last 3 Shifts       11/22 0700 - 11/23 0659 11/23 0700 - 11/24 0659 11/24 0700 - 11/25 0659    P.O. 1110 900     Blood       IV Piggyback  100     Total Intake(mL/kg) 1110 (17.1) 1000 (17.1)     Urine (mL/kg/hr) 200 (0.1) 400 (0.3)     Emesis/NG output 0 0     Other 0 0     Stool 0 0     Blood  0     Total Output 200 400     Net +910 +600            Urine Occurrence 5 x 6 x     Stool Occurrence 4 x 3 x     Emesis Occurrence 0 x 0 x           Physical Exam   Constitutional: She is oriented to person, place, and time. She appears well-developed. No distress.   Temporal and distal extremity muscle wasting   HENT:   Head: Normocephalic and atraumatic.   Eyes: EOM are normal. No scleral icterus.   Neck: Normal range of motion.   Cardiovascular: Normal rate, regular rhythm and normal heart sounds.   No murmur heard.  Pulmonary/Chest: Effort normal. No respiratory distress. She has no wheezes.   Breath sounds decreased at bases   Abdominal: Soft. Bowel sounds are normal. She exhibits distension. There is tenderness. There is no guarding.   Purulent drainage expressed from middle chevron incision + R lateral side, both areas with packing in place.  Minimal tissue erythema  RLQ VALORIE drain sites c/d/i with sutures   Musculoskeletal: Normal range of motion. She exhibits edema (2+ BLE, + generalized).   Neurological: She is alert and oriented to person, place, and time.   Skin: Skin is warm. She is not diaphoretic.   Psychiatric: She has a normal mood and affect.   Nursing note and vitals  reviewed.      Laboratory:  Immunosuppressants         Stop Route Frequency     tacrolimus capsule 1 mg      -- Oral 2 times daily        CBC:   Recent Labs   Lab 11/24/18  0649   WBC 3.70*   RBC 2.82*   HGB 8.2*   HCT 26.2*   PLT 93*   MCV 93   MCH 29.1   MCHC 31.3*     CMP:   Recent Labs   Lab 11/24/18  0649   *   CALCIUM 8.6*   ALBUMIN 2.9*   PROT 5.0*   *   K 3.3*   CO2 27   CL 95   BUN 58*   CREATININE 1.2   ALKPHOS 90   ALT 6*   AST 13   BILITOT 1.3*     Labs within the past 24 hours have been reviewed.    Diagnostic Results:  I have personally reviewed all pertinent imaging studies.

## 2018-11-24 NOTE — ASSESSMENT & PLAN NOTE
Managed per primary team  Avoid hypoglycemia    Recent Labs   Lab 11/24/18  0649   ALT 6*   AST 13   ALKPHOS 90   BILITOT 1.3*   PROT 5.0*   ALBUMIN 2.9*

## 2018-11-24 NOTE — SUBJECTIVE & OBJECTIVE
"Interval HPI:   Overnight events:  Remains in TSU. NAEON. Incision with increased drainage; wound vac placed per primary. BG above goal overnight with scheduled insulin.   Eatin%  Nausea: No  Hypoglycemia and intervention: No  Fever: No  TPN and/or TF: No      /77   Pulse 110   Temp 98.3 °F (36.8 °C)   Resp 19   Ht 5' 3" (1.6 m)   Wt 59.8 kg (131 lb 13.4 oz)   LMP  (LMP Unknown)   SpO2 98%   Breastfeeding? No   BMI 23.35 kg/m²     Labs Reviewed and Include    Recent Labs   Lab 18  0847   *   CALCIUM 8.6*   ALBUMIN 2.9*   PROT 5.2*   *   K 4.1   CO2 28   CL 95   BUN 55*   CREATININE 1.3   ALKPHOS 107   ALT 7*   AST 15   BILITOT 1.4*     Lab Results   Component Value Date    WBC 3.41 (L) 2018    HGB 8.0 (L) 2018    HCT 25.8 (L) 2018    MCV 93 2018    PLT 94 (L) 2018     No results for input(s): TSH, FREET4 in the last 168 hours.  Lab Results   Component Value Date    HGBA1C 6.0 (H) 10/04/2018       Nutritional status:   Body mass index is 23.35 kg/m².  Lab Results   Component Value Date    ALBUMIN 2.9 (L) 2018    ALBUMIN 2.9 (L) 2018    ALBUMIN 3.0 (L) 2018     Lab Results   Component Value Date    PREALBUMIN 15 (L) 2018    PREALBUMIN 12 (L) 2018       Estimated Creatinine Clearance: 33.8 mL/min (based on SCr of 1.3 mg/dL).    Accu-Checks  Recent Labs     18  2240 18  0849 18  1257 18  1537 18  1722 18  2236 18  0803 18  1211 18  1731 18  2127   POCTGLUCOSE 149* 223* 244* 189* 154* 111* 158* 223* 240* 237*       Current Medications and/or Treatments Impacting Glycemic Control  Immunotherapy:    Immunosuppressants         Stop Route Frequency     tacrolimus capsule 1 mg      -- Oral 2 times daily        Steroids:   Hormones (From admission, onward)    Start     Stop Route Frequency Ordered    18 0900  predniSONE tablet 2.5 mg       0859 Oral Daily " 11/05/18 1004    11/24/18 0900  predniSONE tablet 5 mg      12/01 0859 Oral Daily 11/05/18 1004    10/17/18 0945  predniSONE tablet 15 mg      10/24 0859 Oral Daily 10/17/18 0943        Pressors:    Autonomic Drugs (From admission, onward)    Start     Stop Route Frequency Ordered    10/10/18 1500  midodrine tablet 15 mg      -- Oral 3 times daily 10/10/18 1122    10/06/18 1617  rocuronium (ZEMURON) 10 mg/mL injection     Comments:  Created by cabinet override    10/07 0429   10/06/18 1617        Hyperglycemia/Diabetes Medications:   Antihyperglycemics (From admission, onward)    Start     Stop Route Frequency Ordered    11/23/18 1130  insulin aspart U-100 pen 6-12 Units      -- SubQ 3 times daily with meals 11/23/18 0834    10/14/18 0925  insulin aspart U-100 pen 0-5 Units      -- SubQ Before meals & nightly PRN 10/14/18 0825    10/07/18 1200  insulin regular (Humulin R) 100 Units in sodium chloride 0.9% 100 mL infusion      10/13 2100 IV Continuous 10/07/18 0771

## 2018-11-24 NOTE — ASSESSMENT & PLAN NOTE
- Urine Cx 10/15 w/ Klebsiella pneumonia.  - Started Ciprofloxacin (10/17-10/19).   - Started on amoxicillin/levo for H. Pylori (completed abx 11/2).  - Pt c/o lower back pain 11/22. UA w/ trace leuks, moderate wbs, 1+ blood, cx pending  - Pt symptomatic & with ESBL in urine previously.  Ertapenem 1g QD x 3 days (started 11/23), per ID recs

## 2018-11-24 NOTE — CARE UPDATE
BG goal 140-180. BG at or slightly below goal overnight.     Low dose correction scale given kidney function.  BG monitoring AC/HS  novolog 6 units with meals if BG <120 (or call endocrine) or 12 units with meals if >120      ** Please call Endocrine for any BG related issues **

## 2018-11-24 NOTE — ASSESSMENT & PLAN NOTE
- Valcyte for CMV prophylaxis--> holding (10/31) for leukopenia. Pt will need weekly CMV PCR while discontinued.  - CMV PCR noted to be positive at 129. .  - CMV PCR 11/8: 105  - CMV PCR 11/15 with 89 copies  - CMV PCR 11/23 pending  - Bactrim for PCP prophylaxis ([facility]).--> holding (10/31) for leukopenia.   - Start Atovaquone 11/6.  - Fluconazole for fungal prophylaxis (first dose 10/6). D/c Fluc 11/16 bc pt had been on it for > 30 days post take back.

## 2018-11-24 NOTE — PLAN OF CARE
Problem: Patient Care Overview  Goal: Plan of Care Review  Outcome: Ongoing (interventions implemented as appropriate)  Pt AAO x 4. Pt up with 1 person assist and walker. Pt up in chair most of day. Wound vacc placed to chevron incision. See flow sheet for output. Currently denies c/o pain.   remain at pt bedside and active with patient care.  Fall risk precautions initiated.  Pt in lowest bed position setting, lighting adjusted, pt to wear nonskid socks when ambulating, side rails up x2.  Pt remain free from falls during shift.  Pt verbalize understanding to call when needed assistance.  Call light within reach.  Will continue to monitor.

## 2018-11-24 NOTE — ASSESSMENT & PLAN NOTE
- Middle chevron incision opened 11/16 with purulent drainage  - Packed with Aquacel ag at this time. Growing Klebsiella p.   - CT abd/pelvis 11/17- no signs of infection  - 11/17: R side of chevron incision w/ 3 small holes draining purulent fluid, opened and packed w/ aquacel.   - Wound care following.  - wd w increased drainage today.  Wd care notified and wd vac placed.  Apprec recs.

## 2018-11-24 NOTE — PROGRESS NOTES
Call received from Meghna LANDERS NP to place wound VAC to abdominal  wounds secondary to increased drainage. Wounds measured yesterday. Wounds were cleansed and periwound pre-draped.   Right lateral abdomen wound dressing saturated with serous to thin white drainage. The wound bed is mostly pink. Wound cleansed and white sponge wicked to 4 cm tunnel medially.   Mid abd open area with yellow thin slough at the base. Moderate amount of serosang drainage noted. White sponge cut like inverted T and wicked into the tunnels at 9 and 3 oclock.  Black sponge was the placed over both white sponges and bridged together. Seal achieved to 125 mmHg cont suction.     Recommend:  Vac changes Tues Friday 11/24/18 1032       Negative Pressure Wound Therapy    Placement Date/Time: 11/24/18 1000   Orientation: transverse  Location: Abdomen   NPWT Type Vacuum Therapy   Therapy Setting NPWT Continuous therapy   Pressure Setting NPWT 125 mmHg   Sponges Inserted NPWT White;2;Black

## 2018-11-24 NOTE — ASSESSMENT & PLAN NOTE
- Fluctuating H&H.    - Transfusions: 10/14; 10/16; 10/18; 10/19; 10/22; 10/27; 10/29, 10/31, 11/3, 11/21  - FOBT +  - LDH elevated, Hapto < 10.  - EGD 10/18: ulcerated duodenal mucosa.   - Consulted GI about this ongoing issue and further need of another EGD.  - EGD 11/5 unremarkable.   - Iron studies 11/13:  95, TIBC 107, sat iron 89, transferrin 72, ferritin pending  - Hemolytic anemia workup 11/13: Haptoglobin 132, retic 3.9   - Last transfused 1upRBC 11/21. H/H now improved.   - Will continue to monitor closely.

## 2018-11-25 LAB
ALBUMIN SERPL BCP-MCNC: 2.9 G/DL
ALP SERPL-CCNC: 107 U/L
ALT SERPL W/O P-5'-P-CCNC: 7 U/L
ANION GAP SERPL CALC-SCNC: 11 MMOL/L
AST SERPL-CCNC: 15 U/L
BACTERIA FLD AEROBE CULT: NO GROWTH
BACTERIA STL CULT: NORMAL
BACTERIA UR CULT: NORMAL
BASOPHILS # BLD AUTO: 0.01 K/UL
BASOPHILS NFR BLD: 0.3 %
BILIRUB SERPL-MCNC: 1.4 MG/DL
BUN SERPL-MCNC: 55 MG/DL
CALCIUM SERPL-MCNC: 8.6 MG/DL
CHLORIDE SERPL-SCNC: 95 MMOL/L
CO2 SERPL-SCNC: 28 MMOL/L
CREAT SERPL-MCNC: 1.3 MG/DL
DIFFERENTIAL METHOD: ABNORMAL
EOSINOPHIL # BLD AUTO: 0 K/UL
EOSINOPHIL NFR BLD: 0.3 %
ERYTHROCYTE [DISTWIDTH] IN BLOOD BY AUTOMATED COUNT: 16.4 %
EST. GFR  (AFRICAN AMERICAN): 48.4 ML/MIN/1.73 M^2
EST. GFR  (NON AFRICAN AMERICAN): 42 ML/MIN/1.73 M^2
GLUCOSE SERPL-MCNC: 287 MG/DL
GRAM STN SPEC: NORMAL
GRAM STN SPEC: NORMAL
HCT VFR BLD AUTO: 25.8 %
HGB BLD-MCNC: 8 G/DL
IMM GRANULOCYTES # BLD AUTO: 0.06 K/UL
IMM GRANULOCYTES NFR BLD AUTO: 1.8 %
LYMPHOCYTES # BLD AUTO: 0.2 K/UL
LYMPHOCYTES NFR BLD: 4.4 %
MAGNESIUM SERPL-MCNC: 1.4 MG/DL
MCH RBC QN AUTO: 28.8 PG
MCHC RBC AUTO-ENTMCNC: 31 G/DL
MCV RBC AUTO: 93 FL
MONOCYTES # BLD AUTO: 0.2 K/UL
MONOCYTES NFR BLD: 7 %
NEUTROPHILS # BLD AUTO: 2.9 K/UL
NEUTROPHILS NFR BLD: 86.2 %
NRBC BLD-RTO: 0 /100 WBC
PHOSPHATE SERPL-MCNC: 1.5 MG/DL
PLATELET # BLD AUTO: 94 K/UL
PMV BLD AUTO: 9.4 FL
POCT GLUCOSE: 235 MG/DL (ref 70–110)
POCT GLUCOSE: 278 MG/DL (ref 70–110)
POCT GLUCOSE: 294 MG/DL (ref 70–110)
POCT GLUCOSE: 78 MG/DL (ref 70–110)
POCT GLUCOSE: 91 MG/DL (ref 70–110)
POTASSIUM SERPL-SCNC: 4.1 MMOL/L
PROT SERPL-MCNC: 5.2 G/DL
RBC # BLD AUTO: 2.78 M/UL
SODIUM SERPL-SCNC: 134 MMOL/L
TACROLIMUS BLD-MCNC: 5.1 NG/ML
WBC # BLD AUTO: 3.41 K/UL

## 2018-11-25 PROCEDURE — 83735 ASSAY OF MAGNESIUM: CPT

## 2018-11-25 PROCEDURE — 63600175 PHARM REV CODE 636 W HCPCS: Performed by: NURSE PRACTITIONER

## 2018-11-25 PROCEDURE — 80197 ASSAY OF TACROLIMUS: CPT

## 2018-11-25 PROCEDURE — 25000003 PHARM REV CODE 250: Performed by: NURSE PRACTITIONER

## 2018-11-25 PROCEDURE — 84100 ASSAY OF PHOSPHORUS: CPT

## 2018-11-25 PROCEDURE — 94664 DEMO&/EVAL PT USE INHALER: CPT

## 2018-11-25 PROCEDURE — 94761 N-INVAS EAR/PLS OXIMETRY MLT: CPT

## 2018-11-25 PROCEDURE — 99900035 HC TECH TIME PER 15 MIN (STAT)

## 2018-11-25 PROCEDURE — 85025 COMPLETE CBC W/AUTO DIFF WBC: CPT

## 2018-11-25 PROCEDURE — 25000003 PHARM REV CODE 250: Performed by: TRANSPLANT SURGERY

## 2018-11-25 PROCEDURE — 99232 SBSQ HOSP IP/OBS MODERATE 35: CPT | Mod: ,,, | Performed by: NURSE PRACTITIONER

## 2018-11-25 PROCEDURE — 36415 COLL VENOUS BLD VENIPUNCTURE: CPT

## 2018-11-25 PROCEDURE — 99233 SBSQ HOSP IP/OBS HIGH 50: CPT | Mod: 24,,, | Performed by: NURSE PRACTITIONER

## 2018-11-25 PROCEDURE — 25000003 PHARM REV CODE 250: Performed by: PHYSICIAN ASSISTANT

## 2018-11-25 PROCEDURE — 20600001 HC STEP DOWN PRIVATE ROOM

## 2018-11-25 PROCEDURE — 80053 COMPREHEN METABOLIC PANEL: CPT

## 2018-11-25 RX ORDER — INSULIN ASPART 100 [IU]/ML
7-14 INJECTION, SOLUTION INTRAVENOUS; SUBCUTANEOUS
Status: DISCONTINUED | OUTPATIENT
Start: 2018-11-25 | End: 2018-11-28

## 2018-11-25 RX ORDER — FUROSEMIDE 40 MG/1
40 TABLET ORAL DAILY
Status: DISCONTINUED | OUTPATIENT
Start: 2018-11-25 | End: 2018-11-28

## 2018-11-25 RX ADMIN — INSULIN ASPART 3 UNITS: 100 INJECTION, SOLUTION INTRAVENOUS; SUBCUTANEOUS at 01:11

## 2018-11-25 RX ADMIN — INSULIN ASPART 14 UNITS: 100 INJECTION, SOLUTION INTRAVENOUS; SUBCUTANEOUS at 06:11

## 2018-11-25 RX ADMIN — INSULIN ASPART 12 UNITS: 100 INJECTION, SOLUTION INTRAVENOUS; SUBCUTANEOUS at 08:11

## 2018-11-25 RX ADMIN — URSODIOL 300 MG: 300 CAPSULE ORAL at 08:11

## 2018-11-25 RX ADMIN — DIBASIC SODIUM PHOSPHATE, MONOBASIC POTASSIUM PHOSPHATE AND MONOBASIC SODIUM PHOSPHATE 2 TABLET: 852; 155; 130 TABLET ORAL at 08:11

## 2018-11-25 RX ADMIN — DIBASIC SODIUM PHOSPHATE, MONOBASIC POTASSIUM PHOSPHATE AND MONOBASIC SODIUM PHOSPHATE 2 TABLET: 852; 155; 130 TABLET ORAL at 02:11

## 2018-11-25 RX ADMIN — MAGNESIUM OXIDE TAB 400 MG (241.3 MG ELEMENTAL MG) 800 MG: 400 (241.3 MG) TAB at 08:11

## 2018-11-25 RX ADMIN — PANTOPRAZOLE SODIUM 40 MG: 40 TABLET, DELAYED RELEASE ORAL at 08:11

## 2018-11-25 RX ADMIN — INSULIN ASPART 3 UNITS: 100 INJECTION, SOLUTION INTRAVENOUS; SUBCUTANEOUS at 08:11

## 2018-11-25 RX ADMIN — INSULIN ASPART 14 UNITS: 100 INJECTION, SOLUTION INTRAVENOUS; SUBCUTANEOUS at 01:11

## 2018-11-25 RX ADMIN — TACROLIMUS 1 MG: 1 CAPSULE ORAL at 08:11

## 2018-11-25 RX ADMIN — MIDODRINE HYDROCHLORIDE 15 MG: 5 TABLET ORAL at 02:11

## 2018-11-25 RX ADMIN — ERTAPENEM SODIUM 1 G: 1 INJECTION, POWDER, LYOPHILIZED, FOR SOLUTION INTRAMUSCULAR; INTRAVENOUS at 02:11

## 2018-11-25 RX ADMIN — ASPIRIN 81 MG CHEWABLE TABLET 81 MG: 81 TABLET CHEWABLE at 08:11

## 2018-11-25 RX ADMIN — FUROSEMIDE 40 MG: 40 TABLET ORAL at 08:11

## 2018-11-25 RX ADMIN — MIDODRINE HYDROCHLORIDE 15 MG: 5 TABLET ORAL at 08:11

## 2018-11-25 RX ADMIN — TACROLIMUS 1 MG: 1 CAPSULE ORAL at 06:11

## 2018-11-25 RX ADMIN — PREDNISONE 5 MG: 5 TABLET ORAL at 08:11

## 2018-11-25 RX ADMIN — ATOVAQUONE 1500 MG: 750 SUSPENSION ORAL at 08:11

## 2018-11-25 RX ADMIN — INSULIN ASPART 2 UNITS: 100 INJECTION, SOLUTION INTRAVENOUS; SUBCUTANEOUS at 06:11

## 2018-11-25 NOTE — ASSESSMENT & PLAN NOTE
- Fluctuating H&H.    - Transfusions: 10/14; 10/16; 10/18; 10/19; 10/22; 10/27; 10/29, 10/31, 11/3, 11/21  - FOBT +  - LDH elevated, Hapto < 10.  - EGD 10/18: ulcerated duodenal mucosa.   - Consulted GI about this ongoing issue and further need of another EGD.  - EGD 11/5 unremarkable.   - Iron studies 11/13:  95, TIBC 107, sat iron 89, transferrin 72, ferritin pending  - Hemolytic anemia workup 11/13: Haptoglobin 132, retic 3.9   - Last transfused 1upRBC 11/21. H/H now improved.   - CBC remains stable.  Will continue to monitor closely.

## 2018-11-25 NOTE — ASSESSMENT & PLAN NOTE
BG goal 140-180    Continue Novolog 7 units with meals if BG <120 (or call endocrine) or 14 units with meals if >120  Low dose correction scale given kidney function.  BG monitoring AC/HS    Start self/caregiver administration of insulin to review for home.     Discharge planning:  TBD

## 2018-11-25 NOTE — PROGRESS NOTES
"[facility]  Endocrinology  Progress Note    Admit Date: 10/1/2018     Reason for Consult: Management of type 2 DM, Hyperglycemia      Surgical Procedure and Date: Liver transplant 10/5/18, Ex lap 10/6/18     Diabetes diagnosis year:      Home Diabetes Medications:  Lantus 18 units HS and novolog 17 units with meals plus correction scale (150-200 +1)        Lab Results   Component Value Date     HGBA1C 6.8 (H) 2018        How often checking glucose at home? 3-4   BG readings on regimen: 120-150.  Post prandial readings as high as upper 200s  Hypoglycemia on the regimen? yes, none recently   Missed doses on regimen?  No     Diabetes Complications include:     Diabetic peripheral neuropathy      Complicating diabetes co morbidities:   CIRRHOSIS        HPI:  Patient is a 68 y.o. female with a diagnosis of ESLD, listed for liver transplant with meld 23 who underwent live transplant on 10/5/18.  Back to OR on 10/6/18 for ex lap.  Admitted 10/1/18 for hyponatremia s/p paracentesis.  Personal has known diagnosis of diabetes, endocrine consulted for diabetes management.    Post-operative course complicated by ESBL Klebsiella pneumoniae in urine (in contact isolation), peritransplant ascites, worsening renal function (required temporary dialysis), anemia (multiple blood transfusions), and possible GI bleed.     Interval HPI:   Overnight events:  Remains in TSU. NAEON. Incision with increased drainage; wound vac placed per primary. BG above goal overnight with scheduled insulin.   Eatin%  Nausea: No  Hypoglycemia and intervention: No  Fever: No  TPN and/or TF: No      /77   Pulse 110   Temp 98.3 °F (36.8 °C)   Resp 19   Ht 5' 3" (1.6 m)   Wt 59.8 kg (131 lb 13.4 oz)   LMP  (LMP Unknown)   SpO2 98%   Breastfeeding? No   BMI 23.35 kg/m²      Labs Reviewed and Include    Recent Labs   Lab 18  0847   *   CALCIUM 8.6*   ALBUMIN 2.9*   PROT 5.2*   *   K 4.1 "   CO2 28   CL 95   BUN 55*   CREATININE 1.3   ALKPHOS 107   ALT 7*   AST 15   BILITOT 1.4*     Lab Results   Component Value Date    WBC 3.41 (L) 11/25/2018    HGB 8.0 (L) 11/25/2018    HCT 25.8 (L) 11/25/2018    MCV 93 11/25/2018    PLT 94 (L) 11/25/2018     No results for input(s): TSH, FREET4 in the last 168 hours.  Lab Results   Component Value Date    HGBA1C 6.0 (H) 10/04/2018       Nutritional status:   Body mass index is 23.35 kg/m².  Lab Results   Component Value Date    ALBUMIN 2.9 (L) 11/25/2018    ALBUMIN 2.9 (L) 11/24/2018    ALBUMIN 3.0 (L) 11/23/2018     Lab Results   Component Value Date    PREALBUMIN 15 (L) 11/03/2018    PREALBUMIN 12 (L) 09/17/2018       Estimated Creatinine Clearance: 33.8 mL/min (based on SCr of 1.3 mg/dL).    Accu-Checks  Recent Labs     11/22/18  2240 11/23/18  0849 11/23/18  1257 11/23/18  1537 11/23/18  1722 11/23/18  2236 11/24/18  0803 11/24/18  1211 11/24/18  1731 11/24/18  2127   POCTGLUCOSE 149* 223* 244* 189* 154* 111* 158* 223* 240* 237*       Current Medications and/or Treatments Impacting Glycemic Control  Immunotherapy:    Immunosuppressants         Stop Route Frequency     tacrolimus capsule 1 mg      -- Oral 2 times daily        Steroids:   Hormones (From admission, onward)    Start     Stop Route Frequency Ordered    12/01/18 0900  predniSONE tablet 2.5 mg      12/08 0859 Oral Daily 11/05/18 1004    11/24/18 0900  predniSONE tablet 5 mg      12/01 0859 Oral Daily 11/05/18 1004    10/17/18 0945  predniSONE tablet 15 mg      10/24 0859 Oral Daily 10/17/18 0943        Pressors:    Autonomic Drugs (From admission, onward)    Start     Stop Route Frequency Ordered    10/10/18 1500  midodrine tablet 15 mg      -- Oral 3 times daily 10/10/18 1122    10/06/18 1617  rocuronium (ZEMURON) 10 mg/mL injection     Comments:  Created by cabinet override    10/07 0429   10/06/18 5235        Hyperglycemia/Diabetes Medications:   Antihyperglycemics (From admission, onward)    Start      Stop Route Frequency Ordered    11/23/18 1130  insulin aspart U-100 pen 6-12 Units      -- SubQ 3 times daily with meals 11/23/18 0834    10/14/18 0925  insulin aspart U-100 pen 0-5 Units      -- SubQ Before meals & nightly PRN 10/14/18 0825    10/07/18 1200  insulin regular (Humulin R) 100 Units in sodium chloride 0.9% 100 mL infusion      10/13 2100 IV Continuous 10/07/18 1055          ASSESSMENT and PLAN    * Liver transplanted    Managed per primary team  Avoid hypoglycemia    Recent Labs   Lab 11/24/18  0649   ALT 6*   AST 13   ALKPHOS 90   BILITOT 1.3*   PROT 5.0*   ALBUMIN 2.9*            Type 2 diabetes mellitus with diabetic polyneuropathy, with long-term current use of insulin    BG goal 140-180.       Low dose correction scale given kidney function.  BG monitoring AC/HS  novolog 7 units with meals if BG <120 (or call endocrine) or 14 units with meals if >120.     Start self/caregiver administration of insulin to review for home.     Discharge planning:  TBD     EVA (acute kidney injury)    Avoid insulin stacking and hypoglycemia.  Lab Results   Component Value Date    CREATININE 1.2 11/24/2018        Prophylactic immunotherapy    May increase insulin resistance.        Severe malnutrition    Oral intake encouraged, may affect BG readings         Aviva Caldera NP  Endocrinology  [facility]

## 2018-11-25 NOTE — ASSESSMENT & PLAN NOTE
- CRRT 10/14, 10/17, 10/20, 10/31.  - CXR 10/30: abundant fluid in b/l pleural space.  - decreased Lasix to 40 mg QD on 11/25/18.  - Monitor daily weights.

## 2018-11-25 NOTE — ASSESSMENT & PLAN NOTE
- Valcyte for CMV prophylaxis--> holding (10/31) for leukopenia. Pt will need weekly CMV PCR while discontinued.  - CMV PCR noted to be positive at 129. .  - CMV PCR 11/8: 105  - CMV PCR 11/15 with 89 copies  - CMV PCR 11/23 pending  - Bactrim for PCP prophylaxis ([facility]).--> holding (10/31) for leukopenia.   - Start Atovaquone 11/6.  - Fluconazole for fungal prophylaxis (first dose 10/6). D/c Fluc 11/16 bc pt had been on it for > 30 days post take back.

## 2018-11-25 NOTE — ASSESSMENT & PLAN NOTE
- Middle chevron incision opened 11/16 with purulent drainage  - Packed with Aquacel ag at this time. Growing Klebsiella p.   - CT abd/pelvis 11/17- no signs of infection  - 11/17: R side of chevron incision w/ 3 small holes draining purulent fluid, opened and packed w/ aquacel.   - Wound care following.  - wd w increased drainage and thicker over the wkd.   Wd care placed wd vac 11/24/18.  Apprec recs. Extending Ertapenem thru 11/28 per ID.

## 2018-11-25 NOTE — ASSESSMENT & PLAN NOTE
- No plans for RRT in near future, per nephrology  - Trialysis line removed 11/13  - Nephrology involved. Appreciate recs.  - Cr improving daily.   Monitor.  - decreased lasix from 80 mg to 40 mg 11/25.

## 2018-11-25 NOTE — ASSESSMENT & PLAN NOTE
Managed per primary team  Avoid hypoglycemia    Recent Labs   Lab 11/25/18  0847   ALT 7*   AST 15   ALKPHOS 107   BILITOT 1.4*   PROT 5.2*   ALBUMIN 2.9*

## 2018-11-25 NOTE — ASSESSMENT & PLAN NOTE
- Urine Cx 10/15 w/ Klebsiella pneumonia.  - Started Ciprofloxacin (10/17-10/19).   - Started on amoxicillin/levo for H. Pylori (completed abx 11/2).  - Pt c/o lower back pain 11/22. UA w/ trace leuks, moderate wbs, 1+ blood, cx pending  - Pt symptomatic (back pain) & with ESBL in wound previously.  Ertapenem 1g QD x 3 days (started 11/23), per ID recs.  Given pt cont to have back pain and concern for infection in wound ( thick drainage and increased drainage)- will extend Ertapenem for 4 more days per ID (stop date 11/28/18).

## 2018-11-25 NOTE — ASSESSMENT & PLAN NOTE
Avoid insulin stacking and hypoglycemia.  Lab Results   Component Value Date    CREATININE 1.1 11/27/2018

## 2018-11-25 NOTE — SUBJECTIVE & OBJECTIVE
Scheduled Meds:   aspirin  81 mg Oral Daily    atovaquone  1,500 mg Oral Daily    entecavir  0.5 mg Oral Every other day    ertapenem (INVANZ) IVPB  1 g Intravenous Q24H    furosemide  40 mg Oral Daily    heparin (porcine)  5,000 Units Subcutaneous Q8H    insulin aspart U-100  7-14 Units Subcutaneous TIDWM    magnesium oxide  800 mg Oral BID    midodrine  15 mg Oral TID    pantoprazole  40 mg Oral BID    predniSONE  5 mg Oral Daily    Followed by    [START ON 12/1/2018] predniSONE  2.5 mg Oral Daily    tacrolimus  1 mg Oral BID    ursodiol  300 mg Oral BID     Continuous Infusions:  PRN Meds:acetaminophen, bisacodyl, calcium carbonate, dextrose 50%, dextrose 50%, glucagon (human recombinant), glucose, glucose, insulin aspart U-100, ondansetron, ondansetron, polyethylene glycol, sodium chloride 0.9%, traMADol    Review of Systems   Constitutional: Positive for activity change and appetite change (improving). Negative for chills, fatigue and fever.   HENT: Negative for congestion, facial swelling and trouble swallowing.    Eyes: Negative for pain, discharge and visual disturbance.   Respiratory: Negative for cough, chest tightness, shortness of breath and wheezing.    Cardiovascular: Positive for leg swelling. Negative for chest pain and palpitations.   Gastrointestinal: Positive for abdominal distention and abdominal pain. Negative for constipation, diarrhea, nausea and vomiting.   Endocrine: Negative.    Genitourinary: Negative for decreased urine volume, difficulty urinating and dysuria.   Musculoskeletal: Positive for back pain. Negative for arthralgias, neck pain and neck stiffness.   Skin: Positive for wound.   Allergic/Immunologic: Positive for immunocompromised state.   Neurological: Positive for weakness. Negative for tremors and headaches.   Psychiatric/Behavioral: Negative for confusion and sleep disturbance.     Objective:     Vital Signs (Most Recent):  Temp: 98.7 °F (37.1 °C) (11/25/18  1122)  Pulse: (!) 115 (11/25/18 1115)  Resp: (!) 24 (11/25/18 1115)  BP: 117/73 (11/25/18 1115)  SpO2: 100 % (11/25/18 1115) Vital Signs (24h Range):  Temp:  [98.2 °F (36.8 °C)-98.7 °F (37.1 °C)] 98.7 °F (37.1 °C)  Pulse:  [] 115  Resp:  [12-24] 24  SpO2:  [97 %-100 %] 100 %  BP: (112-134)/(70-81) 117/73     Weight: 59.8 kg (131 lb 13.4 oz)  Body mass index is 23.35 kg/m².    Intake/Output - Last 3 Shifts       11/23 0700 - 11/24 0659 11/24 0700 - 11/25 0659 11/25 0700 - 11/26 0659    P.O. 900 1020 400    IV Piggyback 100 100     Total Intake(mL/kg) 1000 (17.1) 1120 (18.7) 400 (6.7)    Urine (mL/kg/hr) 400 (0.3) 900 (0.6)     Emesis/NG output 0      Other 0 100     Stool 0 0     Blood 0      Total Output 400 1000     Net +600 +120 +400           Urine Occurrence 6 x 1 x 1 x    Stool Occurrence 3 x 4 x     Emesis Occurrence 0 x            Physical Exam   Constitutional: She is oriented to person, place, and time. She appears well-developed. No distress.   Temporal and distal extremity muscle wasting   HENT:   Head: Normocephalic and atraumatic.   Eyes: EOM are normal. No scleral icterus.   Neck: Normal range of motion.   Cardiovascular: Normal rate, regular rhythm and normal heart sounds.   No murmur heard.  Pulmonary/Chest: Effort normal. No respiratory distress. She has no wheezes.   Breath sounds decreased at bases   Abdominal: Soft. Bowel sounds are normal. She exhibits distension. There is tenderness. There is no guarding.   Purulent drainage expressed from middle chevron incision + R lateral side, both areas with packing in place.  Minimal tissue erythema  RLQ VALORIE drain sites c/d/i with sutures   Musculoskeletal: Normal range of motion. She exhibits edema (2+ BLE, + generalized).   Neurological: She is alert and oriented to person, place, and time.   Skin: Skin is warm. She is not diaphoretic.   Psychiatric: She has a normal mood and affect.   Nursing note and vitals  reviewed.      Laboratory:  Immunosuppressants         Stop Route Frequency     tacrolimus capsule 1 mg      -- Oral 2 times daily        CBC:   Recent Labs   Lab 11/25/18  0847   WBC 3.41*   RBC 2.78*   HGB 8.0*   HCT 25.8*   PLT 94*   MCV 93   MCH 28.8   MCHC 31.0*     CMP:   Recent Labs   Lab 11/25/18  0847   *   CALCIUM 8.6*   ALBUMIN 2.9*   PROT 5.2*   *   K 4.1   CO2 28   CL 95   BUN 55*   CREATININE 1.3   ALKPHOS 107   ALT 7*   AST 15   BILITOT 1.4*     Labs within the past 24 hours have been reviewed.    Diagnostic Results:  I have personally reviewed all pertinent imaging studies.

## 2018-11-25 NOTE — PLAN OF CARE
Problem: Patient Care Overview  Goal: Plan of Care Review  Outcome: Ongoing (interventions implemented as appropriate)  Pt AAO x 4. Pt up with assistance and walker. Pt complaining of back pain but refusing pain medications. Pt concerned about old heparin injection site that was bleeding. Held pressure and applied new dressing. Pt's -120s. Encouraged patient to drink fluids. Pt on IV antibiotics. Pt has wound vacc in place.   Pt free of falls and injury. Pt wearing nonskid socks/shoes when out of bed, bed in lowest position, side rails up x 2, call light within reach.

## 2018-11-25 NOTE — PROGRESS NOTES
[facility]  Liver Transplant  Progress Note    Patient Name: Scarlett Reddy  MRN: [id number]  Admission Date: 10/1/2018  Hospital Length of Stay: 54 days  Code Status: Full Code  Primary Care Provider: Primary Doctor No  Post-Operative Day: 51    ORGAN:   LIVER  Disease Etiology: Cirrhosis: Type B and D  Donor Type:    - Brain Death  CDC High Risk:   No  Donor CMV Status:   Donor CMV Status: Positive  Donor HBcAB:   Negative  Donor HCV Status:   Negative  Donor HBV SETH: Negative  Donor HCV SETH: Negative  Whole or Partial: Whole Liver  Biliary Anastomosis: End to End  Arterial Anatomy: Replaced Left Hepatic and Right Hepatic  Subjective:     History of Present Illness:  Ms. Reddy is a 67 y/o female with PMH of ESLD secondary Hep B and D.  Listed for liver transplant with MELD 23.  Paracentesis 10/1 with 5L removed, no fluid sent for analysis.  Morning labs significant for hyponatremia, Na 119.  Pt admitted to LTS for sodium monitoring.        Hospital Course:  Hospital Course: 67 y/o F h/o HBV/delta cirrhosis s/p DBDLT 10/5/18 (CMV D+/R+, steroid induction, MMF/tacro/pred maintenance) with take back on 10/6 2/2 hyperbilirubinema and bilious VALORIE drainage, no leak identified. Post-op course c/b hypercapneic and hypoxic respiratory failure and was reintubated though quickly extubated now doing well. Liver bx (10/6) sig for Zone 3 hemorrhage and collapse, in addition to cholestasis. Transferred from ICU on POD #4. ID consulted to comment on positive diphtheroid culture from ascitic fluid (likely skin colonization) as well as ESBL Klebsiella pneumoniae in urine culture (10/4).  Pt asymptomatic and cell count from ascitic fluid unremarkable. Per ID recs, no need to treat diphtheroids or asymptomatic ESBL Kleb pneumo bacteriuria though pt has had 6 days of therapy which would cover these organisms. Mild peritransplant ascites- repeat US 10/6 with increased arterial resistive indices. Repeat US 10/9  w/ continued elevation in RIs and fluid collections. LFTs trending down nicely.     Acute gradual worsening of renal function s/p OLTx. Creatinine on arrival 0.8 and has been up trending. Nephrology consulted for post-op EVA. Cr cont to trend up but remains to have good UOP. Had stable response to lasiz diuresis.  BUN elevated requiring multiple rounds of dialysis (10/15, 10/17, 10/20, 10/31). No significant improvements in kidney function noted.   In addition, pt H/H cont to fluctuate requiring multiple blood transfusions (10/14, 10/16, 10/18, 10/19, 10/22, 10/27, 10/29).  Pt reported dark stools 10/15 but color has normalized. FOBT+. EGD 10/18 consistent with ulcerated mucosa in duodenum s/p coagulation. Concern for H. Pylori. Started Amoxicillin/levofloxacin (10/19, complete 11/2)). Remains on Protonix 40 mg BID. Biopsy negative for infection and CMV. Will cont close monitoring H/H, transfuse as needed with goal Hb > 7.0. Of note, Urine cx + Klebsiella pneumoniae (10/13). Received 3 day course of ciprofloxacin, dc'd 10/19.   On 10/19 pm pt c/o crushing chest pain. Troponin 0.094, likely 2/2 ischemic stress. EKG NSR.   Liver US 11/6 showed no arterial flow. Paracentesis 11/7 with 8.9 L removed. Wbc 39, segs 16%. Gram stain no organisms seen + no WBC. Pt feels better since having fluid removed but she continues to have worsening abdominal distention & LE edema.Repeat Liver US 11/8 showed arterial flow with increased RIs. Paracentesis 11/12 with 8.6L removed (WBC 34, segs 25%). 11/15 liver u/s showed abnormally decreased main hepatic artery peak systolic velocity with slow arterial flow and abnormal tardus parvus waveforms intrahepatically. Consulted vascular surgery. Repeat liver US 11/19 showed no arterial flow on R and sluggish flow on L. Hepatic angiogram 11/19 confirmed HAT.  Friday 11/16, purulent drainage noted from middle of chevron incision. Area opened and packed. Cx grew klebsiella p. CT scan showed no  signs of infection. On Monday 11/18, it was noted that the right/lateral side of the chevron had 3 small openings draining purulent fluid. Opened the area and packed. Wound care following.    Interval history:  C/o back pain, ? related to bed. Pt has wd vac to incision (middle portion and right end).  Wd bed yesterday w slough tissues.  Drainage was thick.  Pt received Ertapenem x3 days for kleb pneumo ESBL UTI.  Of note, pt grew same bacteria from wound 11/16.  HR .  Decreased lasix from 80 mg to 40 mg qd.  (Cr 1.2 to 1.3 today.  Also s/w ID, given concern for continued wd infection, back pain and tachycardia, will extend Ertapenem for 4 more day (total of 7 days).  If concerned after 7 days, re consult ID.  She is tolerating diet.  Denies diarrhea.  Ambulating w/o difficulty.  Continue to monitor. Possible d/c mid week.    Scheduled Meds:   aspirin  81 mg Oral Daily    atovaquone  1,500 mg Oral Daily    entecavir  0.5 mg Oral Every other day    ertapenem (INVANZ) IVPB  1 g Intravenous Q24H    furosemide  40 mg Oral Daily    heparin (porcine)  5,000 Units Subcutaneous Q8H    insulin aspart U-100  7-14 Units Subcutaneous TIDWM    magnesium oxide  800 mg Oral BID    midodrine  15 mg Oral TID    pantoprazole  40 mg Oral BID    predniSONE  5 mg Oral Daily    Followed by    [START ON 12/1/2018] predniSONE  2.5 mg Oral Daily    tacrolimus  1 mg Oral BID    ursodiol  300 mg Oral BID     Continuous Infusions:  PRN Meds:acetaminophen, bisacodyl, calcium carbonate, dextrose 50%, dextrose 50%, glucagon (human recombinant), glucose, glucose, insulin aspart U-100, ondansetron, ondansetron, polyethylene glycol, sodium chloride 0.9%, traMADol    Review of Systems   Constitutional: Positive for activity change and appetite change (improving). Negative for chills, fatigue and fever.   HENT: Negative for congestion, facial swelling and trouble swallowing.    Eyes: Negative for pain, discharge and visual  disturbance.   Respiratory: Negative for cough, chest tightness, shortness of breath and wheezing.    Cardiovascular: Positive for leg swelling. Negative for chest pain and palpitations.   Gastrointestinal: Positive for abdominal distention and abdominal pain. Negative for constipation, diarrhea, nausea and vomiting.   Endocrine: Negative.    Genitourinary: Negative for decreased urine volume, difficulty urinating and dysuria.   Musculoskeletal: Positive for back pain. Negative for arthralgias, neck pain and neck stiffness.   Skin: Positive for wound.   Allergic/Immunologic: Positive for immunocompromised state.   Neurological: Positive for weakness. Negative for tremors and headaches.   Psychiatric/Behavioral: Negative for confusion and sleep disturbance.     Objective:     Vital Signs (Most Recent):  Temp: 98.7 °F (37.1 °C) (11/25/18 1122)  Pulse: (!) 115 (11/25/18 1115)  Resp: (!) 24 (11/25/18 1115)  BP: 117/73 (11/25/18 1115)  SpO2: 100 % (11/25/18 1115) Vital Signs (24h Range):  Temp:  [98.2 °F (36.8 °C)-98.7 °F (37.1 °C)] 98.7 °F (37.1 °C)  Pulse:  [] 115  Resp:  [12-24] 24  SpO2:  [97 %-100 %] 100 %  BP: (112-134)/(70-81) 117/73     Weight: 59.8 kg (131 lb 13.4 oz)  Body mass index is 23.35 kg/m².    Intake/Output - Last 3 Shifts       11/23 0700 - 11/24 0659 11/24 0700 - 11/25 0659 11/25 0700 - 11/26 0659    P.O. 900 1020 400    IV Piggyback 100 100     Total Intake(mL/kg) 1000 (17.1) 1120 (18.7) 400 (6.7)    Urine (mL/kg/hr) 400 (0.3) 900 (0.6)     Emesis/NG output 0      Other 0 100     Stool 0 0     Blood 0      Total Output 400 1000     Net +600 +120 +400           Urine Occurrence 6 x 1 x 1 x    Stool Occurrence 3 x 4 x     Emesis Occurrence 0 x            Physical Exam   Constitutional: She is oriented to person, place, and time. She appears well-developed. No distress.   Temporal and distal extremity muscle wasting   HENT:   Head: Normocephalic and atraumatic.   Eyes: EOM are normal. No scleral  icterus.   Neck: Normal range of motion.   Cardiovascular: Normal rate, regular rhythm and normal heart sounds.   No murmur heard.  Pulmonary/Chest: Effort normal. No respiratory distress. She has no wheezes.   Breath sounds decreased at bases   Abdominal: Soft. Bowel sounds are normal. She exhibits distension. There is tenderness. There is no guarding.   Purulent drainage expressed from middle chevron incision + R lateral side, both areas with packing in place.  Minimal tissue erythema  RLQ VALORIE drain sites c/d/i with sutures   Musculoskeletal: Normal range of motion. She exhibits edema (2+ BLE, + generalized).   Neurological: She is alert and oriented to person, place, and time.   Skin: Skin is warm. She is not diaphoretic.   Psychiatric: She has a normal mood and affect.   Nursing note and vitals reviewed.      Laboratory:  Immunosuppressants         Stop Route Frequency     tacrolimus capsule 1 mg      -- Oral 2 times daily        CBC:   Recent Labs   Lab 11/25/18  0847   WBC 3.41*   RBC 2.78*   HGB 8.0*   HCT 25.8*   PLT 94*   MCV 93   MCH 28.8   MCHC 31.0*     CMP:   Recent Labs   Lab 11/25/18  0847   *   CALCIUM 8.6*   ALBUMIN 2.9*   PROT 5.2*   *   K 4.1   CO2 28   CL 95   BUN 55*   CREATININE 1.3   ALKPHOS 107   ALT 7*   AST 15   BILITOT 1.4*     Labs within the past 24 hours have been reviewed.    Diagnostic Results:  I have personally reviewed all pertinent imaging studies.    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   - POD 1 US: increased arterial resistive indices, suggestive of edema vs acute rejection vs congestion.  - Repeat US 10/9 with continued elevation of RIs.  - LFTs stable.  - T bili trending down.   - Repeat liver US (10/11): elevated RIs.  - Liver US 11/6 to assess for ascites. No arterial flow within the  "liver allograft concerning for interval thrombosis/occlusion of the arterial system. Severe ascites seen.  - Liver US 11/8: arterial flow seen w/ elevated RIs   - LFTs remain stable. No CTA at this time due to EVA and normal liver function.   - Liver US 11/15 showed "abnormally decreased main hepatic artery peak systolic velocity with slow arterial flow and abnormal tardus parvus waveforms intrahepatically."   -Vascular surgery consulted  - Liver US 11/19 showed no arterial flow on the right and sluggish flow on the left.   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 No further intervention planned at this time as patient's LFTs are stable. Will monitor.         Hypokalemia    - replaced as needed.  Continue to monitor.     Hypophosphatemia    - Continue to monitor & replace as needed.  Trended down to 1.5 on 11/25. Supplement ordered.        Delayed surgical wound healing    - Middle chevron incision opened 11/16 with purulent drainage  - Packed with Aquacel ag at this time. Growing Klebsiella p.   - CT abd/pelvis 11/17- no signs of infection  - 11/17: R side of chevron incision w/ 3 small holes draining purulent fluid, opened and packed w/ aquacel.   - Wound care following.  - wd w increased drainage and thicker over the wkd.   Wd care placed wd vac 11/24/18.  Apprec recs. Extending Ertapenem thru 11/28 per ID.            Hepatic artery thrombosis of transplanted liver    - Liver US 11/19 showed no arterial flow to R and sluggish flow to L  - Hepatic angiogram 11/19 confirmed HAT  - No further intervention planned at this time. Will continue to monitor.     Thrombocytopenia, unspecified    - Improving.  - Monitor.       Alteration in skin integrity    - Monitor.       Leukopenia    - Stopped Cellcept, Bactrim, and Valcyte 10/31 2/2 leukopenia  - Started Atovaquone 11/6.   - CMV PCR noted to be positive at 129. Will hold off on treatment with valcyte at this time.  - Continue to monitor.       Azotemia " "   - CRRT 10/31 for uremic toxin clearance.           UTI (urinary tract infection)    - Urine Cx 10/15 w/ Klebsiella pneumonia.  - Started Ciprofloxacin (10/17-10/19).   - Started on amoxicillin/levo for H. Pylori (completed abx 11/2).  - Pt c/o lower back pain 11/22. UA w/ trace leuks, moderate wbs, 1+ blood, cx pending  - Pt symptomatic (back pain) & with ESBL in wound previously.  Ertapenem 1g QD x 3 days (started 11/23), per ID recs.  Given pt cont to have back pain and concern for infection in wound ( thick drainage and increased drainage)- will extend Ertapenem for 4 more days per ID (stop date 11/28/18).         Acute blood loss anemia    - H&H cont to fluctuate.   - FOBT +.  - EGD 10/18 - showed diffuse bleeding with ulcerated duodenal mucosa.  - EGD 11/5 - improvement seen, no active bleeding.  - Resume heparin sq injections 11/6.   - Decrease heparin sq from TID to BID 11/15   - Heparin dc'd 11/17  - See "anemia requiring transfusions."  - Continue to monitor.             Post LT - Acute renal failure    - Creatinine on arrival 0.8 and trended up.  - Nephrology consulted.  - Renal US (10/11): no hydronephrosis.  - Echo 10/12: diastolic dysfunction, likely 2/2 to ARF.  - CRRT 10/14; SLED 10/17, 10/20. --> no improvements noted in kidney fxn.  - 24 hr urine creatine collection: CrCl of 7.   - CRRT 10/31 with 1L removed.   - See "hypervolemia associated with renal insufficiency"     Hypervolemia associated with renal insufficiency    - CRRT 10/14, 10/17, 10/20, 10/31.  - CXR 10/30: abundant fluid in b/l pleural space.  - decreased Lasix to 40 mg QD on 11/25/18.  - Monitor daily weights.       EVA (acute kidney injury)    - No plans for RRT in near future, per nephrology  - Trialysis line removed 11/13  - Nephrology involved. Appreciate recs.  - Cr improving daily.   Monitor.  - decreased lasix from 80 mg to 40 mg 11/25.      At risk for opportunistic infections    - Valcyte for CMV prophylaxis--> holding " (10/31) for leukopenia. Pt will need weekly CMV PCR while discontinued.  - CMV PCR noted to be positive at 129. .  - CMV PCR 11/8: 105  - CMV PCR 11/15 with 89 copies  - CMV PCR 11/23 pending  - Bactrim for PCP prophylaxis ([facility]).--> holding (10/31) for leukopenia.   - Start Atovaquone 11/6.  - Fluconazole for fungal prophylaxis (first dose 10/6). D/c Fluc 11/16 bc pt had been on it for > 30 days post take back.       Long-term use of immunosuppressant medication    - Prograf: stopped 10/29. Resumed 11/2.  --> Will monitor for signs of toxic side effects, check daily troughs, and change meds accordingly.   - Prednisone: stopped 10/29. Started on Hydrocortisone. pred taper resumed.  - Cellcept: stopped 10/31 for leukopenia. Restarted Cellcept 500 mg bid, 11/6.         Prophylactic immunotherapy    - See long term use immunosuppresion.       Ascites    - Paracentesis 10/1, 5L removed, no fluid sent for analysis.  - Pt remains volume overloaded.   - Liver US 11/6 with severe ascites.   - Paracentesis 11/7 with 8.9 L removed. Wbc 39, segs 16%. Gram stain - no organisms seen and no WBC. Cultures NGTD.  - Paracentesis 11/12: 8.6 L removed. 34 wbc, 25% segs, 33 lymphs, 42 monocytes/macrophages. Cultures NGTD  - Paracentesis 11/21 with 4 L removed, wbc neg for infection.     Weakness    - See physical deconditioning.       Physical deconditioning    - PT/OT consulted.  Progressing well w therapy.  - Recommend home health PT and rolling walker at discharge (orders placed 11/6).       Hypotension    - continue Midodrine 15 mg TID. Monitor       Anemia requiring transfusions    - Fluctuating H&H.    - Transfusions: 10/14; 10/16; 10/18; 10/19; 10/22; 10/27; 10/29, 10/31, 11/3, 11/21  - FOBT +  - LDH elevated, Hapto < 10.  - EGD 10/18: ulcerated duodenal mucosa.   - Consulted GI about this ongoing issue and further need of another EGD.  - EGD 11/5 unremarkable.   - Iron studies 11/13:  95, TIBC 107, sat iron 89, transferrin 72,  ferritin pending  - Hemolytic anemia workup 11/13: Haptoglobin 132, retic 3.9   - Last transfused 1upRBC 11/21. H/H now improved.   - CBC remains stable.  Will continue to monitor closely.           Severe malnutrition    - Dietary consulted.   - Temporal and distal extremity muscle wasting and edema.  - Encourage supplements and to increase nutritional intake.  - Per nephrology, pt to be on low protein diet.   - Prealbumin reviewed.   - tolerating diet.  Denies n/v.       Type 2 diabetes mellitus with diabetic polyneuropathy, with long-term current use of insulin    - Continue home regimen.  - Endocrine consulted.  - Pt off insulin drip at this time.  Apprec endo recs.        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  - Tx w/ Entecavir- dose changed to q72 hours given renal function.             VTE Risk Mitigation (From admission, onward)        Ordered     heparin (porcine) injection 5,000 Units  Every 8 hours      11/22/18 1014     IP VTE HIGH RISK PATIENT  Once      11/05/18 1145     Place sequential compression device  Until discontinued      10/05/18 0709          The patients clinical status was discussed at multidisplinary rounds, involving transplant surgery, transplant medicine, pharmacy, nursing, nutrition, and social work    Discharge Planning:  No plan for discharge today.    Meghna Maldonado NP  Liver Transplant  [facility]

## 2018-11-26 PROBLEM — N18.30 CHRONIC KIDNEY DISEASE, STAGE III (MODERATE): Status: ACTIVE | Noted: 2018-11-26

## 2018-11-26 PROBLEM — E83.42 HYPOMAGNESEMIA: Status: ACTIVE | Noted: 2018-11-26

## 2018-11-26 LAB
ALBUMIN SERPL BCP-MCNC: 2.9 G/DL
ALP SERPL-CCNC: 135 U/L
ALT SERPL W/O P-5'-P-CCNC: 10 U/L
ANION GAP SERPL CALC-SCNC: 12 MMOL/L
AST SERPL-CCNC: 20 U/L
BASOPHILS # BLD AUTO: 0.03 K/UL
BASOPHILS NFR BLD: 0.6 %
BILIRUB SERPL-MCNC: 1.2 MG/DL
BUN SERPL-MCNC: 55 MG/DL
CALCIUM SERPL-MCNC: 8.6 MG/DL
CHLORIDE SERPL-SCNC: 98 MMOL/L
CMV DNA SERPL NAA+PROBE-ACNC: 95 IU/ML
CO2 SERPL-SCNC: 27 MMOL/L
CREAT SERPL-MCNC: 1.1 MG/DL
DIFFERENTIAL METHOD: ABNORMAL
EOSINOPHIL # BLD AUTO: 0.1 K/UL
EOSINOPHIL NFR BLD: 1 %
ERYTHROCYTE [DISTWIDTH] IN BLOOD BY AUTOMATED COUNT: 16.2 %
EST. GFR  (AFRICAN AMERICAN): 59.2 ML/MIN/1.73 M^2
EST. GFR  (NON AFRICAN AMERICAN): 51.3 ML/MIN/1.73 M^2
GLUCOSE SERPL-MCNC: 189 MG/DL
HCT VFR BLD AUTO: 27 %
HGB BLD-MCNC: 8.3 G/DL
IMM GRANULOCYTES # BLD AUTO: 0.1 K/UL
IMM GRANULOCYTES NFR BLD AUTO: 1.9 %
LYMPHOCYTES # BLD AUTO: 0.4 K/UL
LYMPHOCYTES NFR BLD: 7 %
MAGNESIUM SERPL-MCNC: 1.3 MG/DL
MCH RBC QN AUTO: 28.2 PG
MCHC RBC AUTO-ENTMCNC: 30.7 G/DL
MCV RBC AUTO: 92 FL
MONOCYTES # BLD AUTO: 0.4 K/UL
MONOCYTES NFR BLD: 7.4 %
NEUTROPHILS # BLD AUTO: 4.2 K/UL
NEUTROPHILS NFR BLD: 82.1 %
NRBC BLD-RTO: 0 /100 WBC
PHOSPHATE SERPL-MCNC: 1.8 MG/DL
PLATELET # BLD AUTO: 132 K/UL
PMV BLD AUTO: 10.1 FL
POCT GLUCOSE: 165 MG/DL (ref 70–110)
POCT GLUCOSE: 193 MG/DL (ref 70–110)
POCT GLUCOSE: 204 MG/DL (ref 70–110)
POCT GLUCOSE: 223 MG/DL (ref 70–110)
POTASSIUM SERPL-SCNC: 3.9 MMOL/L
PROT SERPL-MCNC: 5.2 G/DL
RBC # BLD AUTO: 2.94 M/UL
SODIUM SERPL-SCNC: 137 MMOL/L
TACROLIMUS BLD-MCNC: 4.8 NG/ML
WBC # BLD AUTO: 5.14 K/UL

## 2018-11-26 PROCEDURE — 83735 ASSAY OF MAGNESIUM: CPT

## 2018-11-26 PROCEDURE — 63600175 PHARM REV CODE 636 W HCPCS: Performed by: NURSE PRACTITIONER

## 2018-11-26 PROCEDURE — 25000003 PHARM REV CODE 250: Performed by: PHYSICIAN ASSISTANT

## 2018-11-26 PROCEDURE — 85025 COMPLETE CBC W/AUTO DIFF WBC: CPT

## 2018-11-26 PROCEDURE — 84100 ASSAY OF PHOSPHORUS: CPT

## 2018-11-26 PROCEDURE — 94761 N-INVAS EAR/PLS OXIMETRY MLT: CPT

## 2018-11-26 PROCEDURE — 63600175 PHARM REV CODE 636 W HCPCS: Performed by: PHYSICIAN ASSISTANT

## 2018-11-26 PROCEDURE — 99233 SBSQ HOSP IP/OBS HIGH 50: CPT | Mod: 24,,, | Performed by: PHYSICIAN ASSISTANT

## 2018-11-26 PROCEDURE — 20600001 HC STEP DOWN PRIVATE ROOM

## 2018-11-26 PROCEDURE — 99900035 HC TECH TIME PER 15 MIN (STAT)

## 2018-11-26 PROCEDURE — 80197 ASSAY OF TACROLIMUS: CPT

## 2018-11-26 PROCEDURE — 36415 COLL VENOUS BLD VENIPUNCTURE: CPT

## 2018-11-26 PROCEDURE — 80053 COMPREHEN METABOLIC PANEL: CPT

## 2018-11-26 PROCEDURE — 25000003 PHARM REV CODE 250: Performed by: NURSE PRACTITIONER

## 2018-11-26 PROCEDURE — 97110 THERAPEUTIC EXERCISES: CPT

## 2018-11-26 PROCEDURE — 25000003 PHARM REV CODE 250: Performed by: TRANSPLANT SURGERY

## 2018-11-26 PROCEDURE — 94664 DEMO&/EVAL PT USE INHALER: CPT

## 2018-11-26 PROCEDURE — 25500020 PHARM REV CODE 255: Performed by: STUDENT IN AN ORGANIZED HEALTH CARE EDUCATION/TRAINING PROGRAM

## 2018-11-26 RX ORDER — MAGNESIUM SULFATE HEPTAHYDRATE 40 MG/ML
2 INJECTION, SOLUTION INTRAVENOUS ONCE
Status: COMPLETED | OUTPATIENT
Start: 2018-11-26 | End: 2018-11-26

## 2018-11-26 RX ORDER — ENTECAVIR 0.5 MG/1
0.5 TABLET, FILM COATED ORAL DAILY
Status: DISCONTINUED | OUTPATIENT
Start: 2018-11-27 | End: 2018-12-21 | Stop reason: HOSPADM

## 2018-11-26 RX ADMIN — ERTAPENEM SODIUM 1 G: 1 INJECTION, POWDER, LYOPHILIZED, FOR SOLUTION INTRAMUSCULAR; INTRAVENOUS at 02:11

## 2018-11-26 RX ADMIN — MIDODRINE HYDROCHLORIDE 15 MG: 5 TABLET ORAL at 09:11

## 2018-11-26 RX ADMIN — URSODIOL 300 MG: 300 CAPSULE ORAL at 09:11

## 2018-11-26 RX ADMIN — ENTECAVIR 0.5 MG: 0.5 TABLET ORAL at 09:11

## 2018-11-26 RX ADMIN — ACETAMINOPHEN 650 MG: 325 TABLET, FILM COATED ORAL at 11:11

## 2018-11-26 RX ADMIN — INSULIN ASPART 14 UNITS: 100 INJECTION, SOLUTION INTRAVENOUS; SUBCUTANEOUS at 08:11

## 2018-11-26 RX ADMIN — DIBASIC SODIUM PHOSPHATE, MONOBASIC POTASSIUM PHOSPHATE AND MONOBASIC SODIUM PHOSPHATE 2 TABLET: 852; 155; 130 TABLET ORAL at 09:11

## 2018-11-26 RX ADMIN — ATOVAQUONE 1500 MG: 750 SUSPENSION ORAL at 09:11

## 2018-11-26 RX ADMIN — MAGNESIUM OXIDE TAB 400 MG (241.3 MG ELEMENTAL MG) 800 MG: 400 (241.3 MG) TAB at 09:11

## 2018-11-26 RX ADMIN — TACROLIMUS 1 MG: 1 CAPSULE ORAL at 09:11

## 2018-11-26 RX ADMIN — ASPIRIN 81 MG CHEWABLE TABLET 81 MG: 81 TABLET CHEWABLE at 09:11

## 2018-11-26 RX ADMIN — INSULIN ASPART 14 UNITS: 100 INJECTION, SOLUTION INTRAVENOUS; SUBCUTANEOUS at 11:11

## 2018-11-26 RX ADMIN — INSULIN ASPART 14 UNITS: 100 INJECTION, SOLUTION INTRAVENOUS; SUBCUTANEOUS at 04:11

## 2018-11-26 RX ADMIN — PREDNISONE 5 MG: 5 TABLET ORAL at 09:11

## 2018-11-26 RX ADMIN — PANTOPRAZOLE SODIUM 40 MG: 40 TABLET, DELAYED RELEASE ORAL at 09:11

## 2018-11-26 RX ADMIN — IOHEXOL 15 ML: 350 INJECTION, SOLUTION INTRAVENOUS at 02:11

## 2018-11-26 RX ADMIN — MAGNESIUM SULFATE IN WATER 2 G: 40 INJECTION, SOLUTION INTRAVENOUS at 11:11

## 2018-11-26 RX ADMIN — FUROSEMIDE 40 MG: 40 TABLET ORAL at 09:11

## 2018-11-26 RX ADMIN — TACROLIMUS 1 MG: 1 CAPSULE ORAL at 05:11

## 2018-11-26 RX ADMIN — INSULIN ASPART 2 UNITS: 100 INJECTION, SOLUTION INTRAVENOUS; SUBCUTANEOUS at 11:11

## 2018-11-26 RX ADMIN — INSULIN ASPART 2 UNITS: 100 INJECTION, SOLUTION INTRAVENOUS; SUBCUTANEOUS at 08:11

## 2018-11-26 NOTE — ASSESSMENT & PLAN NOTE
- CRRT 10/14, 10/17, 10/20, 10/31.  - CXR 10/30: abundant fluid in b/l pleural space.  - Decreased Lasix to 40 mg QD on 11/25/18. Continue at this dose.  - Monitor daily weights.

## 2018-11-26 NOTE — ASSESSMENT & PLAN NOTE
- HBsAg+, Received HBIG (last weekly dose 11/2).  - Tx w/ Entecavir- dose changed to daily 11/26 due to improved renal fx

## 2018-11-26 NOTE — ASSESSMENT & PLAN NOTE
- Prograf: stopped 10/29. Resumed 11/2.  --> Will monitor for signs of toxic side effects, check daily troughs, and change meds accordingly.   - Prednisone: stopped 10/29. Started on Hydrocortisone. pred taper resumed.  - Cellcept: stopped 10/31 for leukopenia. Restarted Cellcept 500 mg bid, 11/6.  - Cellcept held 11/23 2/2 infection

## 2018-11-26 NOTE — PROGRESS NOTES
Ms Scarlett Reddy has done well over the weekend. Her sCr has return back to 1.1 mg/dL and Na has remain stable > 135 mmol/L. Will sign off please call with any questions will be happy to help. Thank you for allowing us to participate in the care of Ms Reddy.   Dakota Garcia MD  Nephrology Fellow   [phone]

## 2018-11-26 NOTE — ASSESSMENT & PLAN NOTE
- Paracentesis 10/1, 5L removed, no fluid sent for analysis.  - Pt remains volume overloaded.   - Liver US 11/6 with severe ascites.   - Paracentesis 11/7 with 8.9 L removed. Wbc 39, segs 16%. Gram stain - no organisms seen and no WBC. Cultures NGTD.  - Paracentesis 11/12: 8.6 L removed. 34 wbc, 25% segs, 33 lymphs, 42 monocytes/macrophages. Cultures NGTD  - Paracentesis 11/21 with 4 L removed, neg for infection.

## 2018-11-26 NOTE — PLAN OF CARE
Problem: Patient Care Overview  Goal: Plan of Care Review  Outcome: Ongoing (interventions implemented as appropriate)  Patient AAO today, one person assist with ambulation and walker. Wound vac dcd this am per TIFFANY Maldonado NP request, wet to dry applied, will replace wound vac tomorrow per wound care.  Possible chyle colored fluid noted to wound vac today.  CT od abd and pelvis scheduled for this afternoon.  Patient complains of pain, but did not want to take pain medication, required much encouragement to take tylenol.  BG elevated, insulin administered.  Tmax 100.3, ID consulted, on ertapenam.  Magnesium replaced today.  Remains on isolation for UTI.   at bedside attentive to patient involved in patients care.

## 2018-11-26 NOTE — ASSESSMENT & PLAN NOTE
- Middle chevron incision opened 11/16 with purulent drainage  - Packed with Aquacel ag at this time. Growing Klebsiella p.   - CT abd/pelvis 11/17- no signs of infection  - 11/17: R side of chevron incision w/ 3 small holes draining purulent fluid, opened and packed w/ aquacel.   - Wound care following.  - Wound w/ increased drainage, so wound care placed vac 11/24/18.  Apprec recs. Extending Ertapenem thru 11/28 per ID.    - Abdomen erythematous and indurated RLQ inferior to chevron.  - CT abd/pelvis 11/26 pending

## 2018-11-26 NOTE — CARE UPDATE
BG goal 140-180. BG below goal overnight but trending up this AM.       Low dose correction scale given kidney function.  BG monitoring AC/HS  novolog 7 units with meals if BG <120 (or call endocrine) or 14 units with meals if >120.         ** Please call Endocrine for any BG related issues **

## 2018-11-26 NOTE — PROGRESS NOTES
[facility]  Liver Transplant  Progress Note    Patient Name: Scarlett Reddy  MRN: [id number]  Admission Date: 10/1/2018  Hospital Length of Stay: 55 days  Code Status: Full Code  Primary Care Provider: Primary Doctor No  Post-Operative Day: 52    ORGAN:   LIVER  Disease Etiology: Cirrhosis: Type B and D  Donor Type:    - Brain Death  CDC High Risk:   No  Donor CMV Status:   Donor CMV Status: Positive  Donor HBcAB:   Negative  Donor HCV Status:   Negative  Donor HBV SETH: Negative  Donor HCV SETH: Negative  Whole or Partial: Whole Liver  Biliary Anastomosis: End to End  Arterial Anatomy: Replaced Left Hepatic and Right Hepatic  Subjective:     History of Present Illness:  Ms. Reddy is a 67 y/o female with PMH of ESLD secondary Hep B and D.  Listed for liver transplant with MELD 23.  Paracentesis 10/1 with 5L removed, no fluid sent for analysis.  Morning labs significant for hyponatremia, Na 119.  Pt admitted to LTS for sodium monitoring.      Hospital Course: 67 y/o F h/o HBV/delta cirrhosis s/p DBDLT 10/5/18 (CMV D+/R+, steroid induction, MMF/tacro/pred maintenance) with take back on 10/6 2/2 hyperbilirubinema and bilious VALORIE drainage, no leak identified. Post-op course c/b hypercapneic and hypoxic respiratory failure and was reintubated though quickly extubated now doing well. Liver bx (10/6) sig for Zone 3 hemorrhage and collapse, in addition to cholestasis. Transferred from ICU on POD #4. ID consulted to comment on positive diphtheroid culture from ascitic fluid (likely skin colonization) as well as ESBL Klebsiella pneumoniae in urine culture (10/4).  Pt asymptomatic and cell count from ascitic fluid unremarkable. Per ID recs, no need to treat diphtheroids or asymptomatic ESBL Kleb pneumo bacteriuria though pt has had 6 days of therapy which would cover these organisms. Mild peritransplant ascites- repeat US 10/6 with increased arterial resistive indices. Repeat US 10/9 w/ continued  elevation in RIs and fluid collections. LFTs trending down nicely.     Acute gradual worsening of renal function s/p OLTx. Creatinine on arrival 0.8 and has been up trending. Nephrology consulted for post-op EVA. Cr cont to trend up but remains to have good UOP. Had stable response to lasiz diuresis.  BUN elevated requiring multiple rounds of dialysis (10/15, 10/17, 10/20, 10/31). No significant improvements in kidney function noted.   In addition, pt H/H cont to fluctuate requiring multiple blood transfusions (10/14, 10/16, 10/18, 10/19, 10/22, 10/27, 10/29).  Pt reported dark stools 10/15 but color has normalized. FOBT+. EGD 10/18 consistent with ulcerated mucosa in duodenum s/p coagulation. Concern for H. Pylori. Started Amoxicillin/levofloxacin (10/19, complete 11/2)). Remains on Protonix 40 mg BID. Biopsy negative for infection and CMV. Will cont close monitoring H/H, transfuse as needed with goal Hb > 7.0. Of note, Urine cx + Klebsiella pneumoniae (10/13). Received 3 day course of ciprofloxacin, dc'd 10/19.   On 10/19 pm pt c/o crushing chest pain. Troponin 0.094, likely 2/2 ischemic stress. EKG NSR.   Liver US 11/6 showed no arterial flow. Paracentesis 11/7 with 8.9 L removed. Wbc 39, segs 16%. Gram stain no organisms seen + no WBC. Pt feels better since having fluid removed but she continues to have worsening abdominal distention & LE edema.Repeat Liver US 11/8 showed arterial flow with increased RIs. Paracentesis 11/12 with 8.6L removed (WBC 34, segs 25%). 11/15 liver u/s showed abnormally decreased main hepatic artery peak systolic velocity with slow arterial flow and abnormal tardus parvus waveforms intrahepatically. Consulted vascular surgery. Repeat liver US 11/19 showed no arterial flow on R and sluggish flow on L. Hepatic angiogram 11/19 confirmed HAT.  Friday 11/16, purulent drainage noted from middle of chevron incision. Area opened and packed. Cx grew klebsiella p. CT scan showed no signs of  infection. On Monday 11/18, it was noted that the right/lateral side of the chevron had 3 small openings draining purulent fluid. Opened the area and packed. Wound care following.    Interval history:  No acute events overnight. Continues to c/o lower back pain + RLQ pain. Induration + erythema  RLQ under chevron. VSS. CT abd/pelvis pending. Pt has wd vac to incision (middle portion and right end). Drainage purulent. Removed vac today to assess w/ surgeon. Wound care to replace vac tomorrow. Pt on day 4 of 7 of Ertapenem for kleb pneumo ESBL UTI.  Of note, pt grew same bacteria from wound 11/16.  Decreased lasix from 80 mg to 40 mg qd yesterday 2/2 increased Cr and tachycardia. Tachycardia improved and Cr 1.1 from 1.3.  She is tolerating diet.  Denies diarrhea.  Ambulating w/o difficulty.  Continue to monitor. Possible d/c later this week.    Scheduled Meds:   aspirin  81 mg Oral Daily    atovaquone  1,500 mg Oral Daily    [START ON 11/27/2018] entecavir  0.5 mg Oral Daily    ertapenem (INVANZ) IVPB  1 g Intravenous Q24H    furosemide  40 mg Oral Daily    heparin (porcine)  5,000 Units Subcutaneous Q8H    insulin aspart U-100  7-14 Units Subcutaneous TIDWM    k phos di & mono-sod phos mono  2 tablet Oral BID    magnesium oxide  800 mg Oral BID    magnesium sulfate IVPB  2 g Intravenous Once    midodrine  15 mg Oral TID    pantoprazole  40 mg Oral BID    predniSONE  5 mg Oral Daily    Followed by    [START ON 12/1/2018] predniSONE  2.5 mg Oral Daily    tacrolimus  1 mg Oral BID    ursodiol  300 mg Oral BID     Continuous Infusions:  PRN Meds:acetaminophen, bisacodyl, calcium carbonate, dextrose 50%, dextrose 50%, glucagon (human recombinant), glucose, glucose, insulin aspart U-100, omnipaque, ondansetron, ondansetron, polyethylene glycol, sodium chloride 0.9%, traMADol    Review of Systems   Constitutional: Positive for activity change and appetite change (improving). Negative for chills, fatigue and  fever.   HENT: Negative for congestion, facial swelling and trouble swallowing.    Eyes: Negative for pain, discharge and visual disturbance.   Respiratory: Negative for cough, chest tightness, shortness of breath and wheezing.    Cardiovascular: Positive for leg swelling. Negative for chest pain and palpitations.   Gastrointestinal: Positive for abdominal distention and abdominal pain. Negative for constipation, diarrhea, nausea and vomiting.   Endocrine: Negative.    Genitourinary: Negative for decreased urine volume, difficulty urinating and dysuria.   Musculoskeletal: Positive for back pain. Negative for arthralgias, neck pain and neck stiffness.   Skin: Positive for wound.   Allergic/Immunologic: Positive for immunocompromised state.   Neurological: Positive for weakness. Negative for tremors and headaches.   Psychiatric/Behavioral: Negative for confusion and sleep disturbance.     Objective:     Vital Signs (Most Recent):  Temp: 98.6 °F (37 °C) (11/25/18 2000)  Pulse: 105 (11/26/18 1145)  Resp: (!) 23 (11/26/18 1145)  BP: (!) 149/88 (11/26/18 1145)  SpO2: 97 % (11/26/18 1145) Vital Signs (24h Range):  Temp:  [98.6 °F (37 °C)] 98.6 °F (37 °C)  Pulse:  [] 105  Resp:  [13-24] 23  SpO2:  [96 %-100 %] 97 %  BP: (114-149)/(67-88) 149/88     Weight: 59.9 kg (132 lb 0.9 oz)  Body mass index is 23.39 kg/m².    Intake/Output - Last 3 Shifts       11/24 0700 - 11/25 0659 11/25 0700 - 11/26 0659 11/26 0700 - 11/27 0659    P.O. 1020 1000     IV Piggyback 100      Total Intake(mL/kg) 1120 (18.7) 1000 (16.7)     Urine (mL/kg/hr) 900 (0.6) 0 (0)     Emesis/NG output       Other 100 150     Stool 0 0     Blood       Total Output 1000 150     Net +120 +850            Urine Occurrence 1 x 4 x     Stool Occurrence 4 x 3 x           Physical Exam   Constitutional: She is oriented to person, place, and time. She appears well-developed. No distress.   Temporal and distal extremity muscle wasting   HENT:   Head: Normocephalic  and atraumatic.   Eyes: EOM are normal. No scleral icterus.   Neck: Normal range of motion.   Cardiovascular: Normal rate, regular rhythm and normal heart sounds.   No murmur heard.  Pulmonary/Chest: Effort normal. No respiratory distress. She has no wheezes.   Breath sounds decreased at bases   Abdominal: Soft. Bowel sounds are normal. She exhibits distension. There is tenderness. There is no guarding.   Purulent drainage expressed from middle chevron incision + R lateral side, both areas with wd vac in place  Erythema + induration RLQ  RLQ VALORIE drain sites c/d/i with sutures   Musculoskeletal: Normal range of motion. She exhibits edema (2+ BLE, + generalized).   Neurological: She is alert and oriented to person, place, and time.   Skin: Skin is warm. She is not diaphoretic.   Psychiatric: She has a normal mood and affect.   Nursing note and vitals reviewed.      Laboratory:  Immunosuppressants         Stop Route Frequency     tacrolimus capsule 1 mg      -- Oral 2 times daily        CBC:   Recent Labs   Lab 11/26/18  0658   WBC 5.14   RBC 2.94*   HGB 8.3*   HCT 27.0*   *   MCV 92   MCH 28.2   MCHC 30.7*     CMP:   Recent Labs   Lab 11/26/18  0658   *   CALCIUM 8.6*   ALBUMIN 2.9*   PROT 5.2*      K 3.9   CO2 27   CL 98   BUN 55*   CREATININE 1.1   ALKPHOS 135   ALT 10   AST 20   BILITOT 1.2*     Labs within the past 24 hours have been reviewed.    Diagnostic Results:  I have personally reviewed all pertinent imaging studies.    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   - POD 1 US: increased arterial resistive indices, suggestive of edema vs acute rejection vs congestion.  - Repeat US 10/9 with continued elevation of RIs.  - LFTs stable.  - T bili trending down.   - Repeat liver US (10/11): elevated RIs.  - Liver US 11/6 to  "assess for ascites. No arterial flow within the liver allograft concerning for interval thrombosis/occlusion of the arterial system. Severe ascites seen.  - Liver US 11/8: arterial flow seen w/ elevated RIs   - LFTs remain stable. No CTA at this time due to EVA and normal liver function.   - Liver US 11/15 showed "abnormally decreased main hepatic artery peak systolic velocity with slow arterial flow and abnormal tardus parvus waveforms intrahepatically."   -Vascular surgery consulted  - Liver US 11/19 showed no arterial flow on the right and sluggish flow on the left.   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 No further intervention planned at this time as patient's LFTs are stable. Will monitor.         Chronic kidney disease, stage III (moderate)    - Kidney fx improving  - Monitor       Hypomagnesemia    - Continue PO Mag Oxide  - Mag 1.3 11/26 - gave 2 g IV Mag  - Continue to monitor.       Hypokalemia    - Replace as needed.   - Continue to monitor.     Hypophosphatemia    - Continue k phos and monitor closely.       Delayed surgical wound healing    - Middle chevron incision opened 11/16 with purulent drainage  - Packed with Aquacel ag at this time. Growing Klebsiella p.   - CT abd/pelvis 11/17- no signs of infection  - 11/17: R side of chevron incision w/ 3 small holes draining purulent fluid, opened and packed w/ aquacel.   - Wound care following.  - Wound w/ increased drainage, so wound care placed vac 11/24/18.  Apprec recs. Extending Ertapenem thru 11/28 per ID.    - Abdomen erythematous and indurated RLQ inferior to chevron.  - CT abd/pelvis 11/26 pending          Hepatic artery thrombosis of transplanted liver    - Liver US 11/19 showed no arterial flow to R and sluggish flow to L  - Hepatic angiogram 11/19 confirmed HAT  - No further intervention planned at this time. Will continue to monitor.     Thrombocytopenia, unspecified    - Improving.  - Monitor.       Alteration in skin " "integrity    - Monitor.       Leukopenia    - Stopped Cellcept, Bactrim, and Valcyte 10/31   - Cellcept half dose resumed 11/6  - Started Atovaquone 11/6.   - CMV PCR noted to be positive at 129. Will hold off on treatment with valcyte at this time. Monitoring weekly CMV PCRs.  - Cellcept held 11/23 2/2 infection (UTI + wound)  - Leukopenia improving.  - Continue to monitor.       Azotemia    - CRRT 10/31 for uremic toxin clearance.           UTI (urinary tract infection)    - Urine Cx 10/15 w/ Klebsiella pneumonia.  - Started Ciprofloxacin (10/17-10/19).   - Started on amoxicillin/levo for H. Pylori (completed abx 11/2).  - Pt c/o lower back pain 11/22. UA w/ trace leuks, moderate wbs, 1+ blood, cx pending  - Pt symptomatic (back pain) & with ESBL in wound previously.  Ertapenem 1g QD x 3 days (started 11/23), per ID recs.  Given pt cont to have back pain and concern for infection in wound ( thick drainage and increased drainage)-  extended Ertapenem for 4 more days per ID (stop date 11/28/18).         Acute blood loss anemia    - H&H cont to fluctuate.   - FOBT +.  - EGD 10/18 - showed diffuse bleeding with ulcerated duodenal mucosa.  - EGD 11/5 - improvement seen, no active bleeding.  - Resume heparin sq injections 11/6.   - Decrease heparin sq from TID to BID 11/15   - Heparin dc'd 11/17  - See "anemia requiring transfusions."  - Continue to monitor.             Post LT - Acute renal failure    - Creatinine on arrival 0.8 and trended up.  - Nephrology consulted.  - Renal US (10/11): no hydronephrosis.  - Echo 10/12: diastolic dysfunction, likely 2/2 to ARF.  - CRRT 10/14; SLED 10/17, 10/20. --> no improvements noted in kidney fxn.  - 24 hr urine creatine collection: CrCl of 7.   - CRRT 10/31 with 1L removed.   - See "hypervolemia associated with renal insufficiency"     Hypervolemia associated with renal insufficiency    - CRRT 10/14, 10/17, 10/20, 10/31.  - CXR 10/30: abundant fluid in b/l pleural space.  - " Decreased Lasix to 40 mg QD on 11/25/18. Continue at this dose.  - Monitor daily weights.       EVA (acute kidney injury)    - No plans for RRT in near future, per nephrology  - Trialysis line removed 11/13  - Nephrology involved. Appreciate recs.  - Cr improving daily.   Monitor.  - Decreased lasix from 80 mg to 40 mg 11/25.      At risk for opportunistic infections    - Valcyte for CMV prophylaxis--> holding (10/31) for leukopenia. Pt will need weekly CMV PCR while discontinued.  - CMV PCR noted to be positive at 129. .  - CMV PCR 11/8: 105  - CMV PCR 11/15 with 89 copies  - CMV PCR 11/23 pending  - Bactrim for PCP prophylaxis ([facility]).--> holding (10/31) for leukopenia.   - Start Atovaquone 11/6.  - Fluconazole for fungal prophylaxis (first dose 10/6). D/c Fluc 11/16 bc pt had been on it for > 30 days post take back.       Long-term use of immunosuppressant medication    - Prograf: stopped 10/29. Resumed 11/2.  --> Will monitor for signs of toxic side effects, check daily troughs, and change meds accordingly.   - Prednisone: stopped 10/29. Started on Hydrocortisone. pred taper resumed.  - Cellcept: stopped 10/31 for leukopenia. Restarted Cellcept 500 mg bid, 11/6.  - Cellcept held 11/23 2/2 infection         Prophylactic immunotherapy    - See long term use immunosuppresion.       Ascites    - Paracentesis 10/1, 5L removed, no fluid sent for analysis.  - Pt remains volume overloaded.   - Liver US 11/6 with severe ascites.   - Paracentesis 11/7 with 8.9 L removed. Wbc 39, segs 16%. Gram stain - no organisms seen and no WBC. Cultures NGTD.  - Paracentesis 11/12: 8.6 L removed. 34 wbc, 25% segs, 33 lymphs, 42 monocytes/macrophages. Cultures NGTD  - Paracentesis 11/21 with 4 L removed, neg for infection.     Weakness    - See physical deconditioning.       Physical deconditioning    - PT/OT consulted.  Progressing well w therapy.  - Recommend home health PT and rolling walker at discharge (orders placed 11/6).        Hypotension    - continue Midodrine 15 mg TID. Monitor       Anemia requiring transfusions    - Fluctuating H&H.    - Transfusions: 10/14; 10/16; 10/18; 10/19; 10/22; 10/27; 10/29, 10/31, 11/3, 11/21  - FOBT +  - LDH elevated, Hapto < 10.  - EGD 10/18: ulcerated duodenal mucosa.   - Consulted GI about this ongoing issue and further need of another EGD.  - EGD 11/5 unremarkable.   - Iron studies 11/13:  95, TIBC 107, sat iron 89, transferrin 72, ferritin pending  - Hemolytic anemia workup 11/13: Haptoglobin 132, retic 3.9   - Last transfused 1upRBC 11/21. H/H now improved.   - CBC remains stable.  Will continue to monitor closely.           Severe malnutrition    - Dietary consulted.   - Temporal and distal extremity muscle wasting and edema.  - Encourage supplements and to increase nutritional intake.  - Per nephrology, pt to be on low protein diet.   - Prealbumin reviewed.   - Tolerating diet.  Denies n/v.       Type 2 diabetes mellitus with diabetic polyneuropathy, with long-term current use of insulin    - Continue home regimen.  - Endocrine consulted.  - Pt off insulin drip at this time.  Apprec endo recs.        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  - Tx w/ Entecavir- dose changed to daily 11/26 due to improved renal fx             VTE Risk Mitigation (From admission, onward)        Ordered     heparin (porcine) injection 5,000 Units  Every 8 hours      11/22/18 1014     IP VTE HIGH RISK PATIENT  Once      11/05/18 1145     Place sequential compression device  Until discontinued      10/05/18 0709          The patients clinical status was discussed at multidisplinary rounds, involving transplant surgery, transplant medicine, pharmacy, nursing, nutrition, and social work    Discharge Planning:  No Patient Care Coordination Note on file.      Elizabeth Dean PA-C  Liver Transplant  [facility]

## 2018-11-26 NOTE — ASSESSMENT & PLAN NOTE
- Urine Cx 10/15 w/ Klebsiella pneumonia.  - Started Ciprofloxacin (10/17-10/19).   - Started on amoxicillin/levo for H. Pylori (completed abx 11/2).  - Pt c/o lower back pain 11/22. UA w/ trace leuks, moderate wbs, 1+ blood, cx pending  - Pt symptomatic (back pain) & with ESBL in wound previously.  Ertapenem 1g QD x 3 days (started 11/23), per ID recs.  Given pt cont to have back pain and concern for infection in wound ( thick drainage and increased drainage)-  extended Ertapenem for 4 more days per ID (stop date 11/28/18).

## 2018-11-26 NOTE — SUBJECTIVE & OBJECTIVE
Scheduled Meds:   aspirin  81 mg Oral Daily    atovaquone  1,500 mg Oral Daily    [START ON 11/27/2018] entecavir  0.5 mg Oral Daily    ertapenem (INVANZ) IVPB  1 g Intravenous Q24H    furosemide  40 mg Oral Daily    heparin (porcine)  5,000 Units Subcutaneous Q8H    insulin aspart U-100  7-14 Units Subcutaneous TIDWM    k phos di & mono-sod phos mono  2 tablet Oral BID    magnesium oxide  800 mg Oral BID    magnesium sulfate IVPB  2 g Intravenous Once    midodrine  15 mg Oral TID    pantoprazole  40 mg Oral BID    predniSONE  5 mg Oral Daily    Followed by    [START ON 12/1/2018] predniSONE  2.5 mg Oral Daily    tacrolimus  1 mg Oral BID    ursodiol  300 mg Oral BID     Continuous Infusions:  PRN Meds:acetaminophen, bisacodyl, calcium carbonate, dextrose 50%, dextrose 50%, glucagon (human recombinant), glucose, glucose, insulin aspart U-100, omnipaque, ondansetron, ondansetron, polyethylene glycol, sodium chloride 0.9%, traMADol    Review of Systems   Constitutional: Positive for activity change and appetite change (improving). Negative for chills, fatigue and fever.   HENT: Negative for congestion, facial swelling and trouble swallowing.    Eyes: Negative for pain, discharge and visual disturbance.   Respiratory: Negative for cough, chest tightness, shortness of breath and wheezing.    Cardiovascular: Positive for leg swelling. Negative for chest pain and palpitations.   Gastrointestinal: Positive for abdominal distention and abdominal pain. Negative for constipation, diarrhea, nausea and vomiting.   Endocrine: Negative.    Genitourinary: Negative for decreased urine volume, difficulty urinating and dysuria.   Musculoskeletal: Positive for back pain. Negative for arthralgias, neck pain and neck stiffness.   Skin: Positive for wound.   Allergic/Immunologic: Positive for immunocompromised state.   Neurological: Positive for weakness. Negative for tremors and headaches.   Psychiatric/Behavioral:  Negative for confusion and sleep disturbance.     Objective:     Vital Signs (Most Recent):  Temp: 98.6 °F (37 °C) (11/25/18 2000)  Pulse: 105 (11/26/18 1145)  Resp: (!) 23 (11/26/18 1145)  BP: (!) 149/88 (11/26/18 1145)  SpO2: 97 % (11/26/18 1145) Vital Signs (24h Range):  Temp:  [98.6 °F (37 °C)] 98.6 °F (37 °C)  Pulse:  [] 105  Resp:  [13-24] 23  SpO2:  [96 %-100 %] 97 %  BP: (114-149)/(67-88) 149/88     Weight: 59.9 kg (132 lb 0.9 oz)  Body mass index is 23.39 kg/m².    Intake/Output - Last 3 Shifts       11/24 0700 - 11/25 0659 11/25 0700 - 11/26 0659 11/26 0700 - 11/27 0659    P.O. 1020 1000     IV Piggyback 100      Total Intake(mL/kg) 1120 (18.7) 1000 (16.7)     Urine (mL/kg/hr) 900 (0.6) 0 (0)     Emesis/NG output       Other 100 150     Stool 0 0     Blood       Total Output 1000 150     Net +120 +850            Urine Occurrence 1 x 4 x     Stool Occurrence 4 x 3 x           Physical Exam   Constitutional: She is oriented to person, place, and time. She appears well-developed. No distress.   Temporal and distal extremity muscle wasting   HENT:   Head: Normocephalic and atraumatic.   Eyes: EOM are normal. No scleral icterus.   Neck: Normal range of motion.   Cardiovascular: Normal rate, regular rhythm and normal heart sounds.   No murmur heard.  Pulmonary/Chest: Effort normal. No respiratory distress. She has no wheezes.   Breath sounds decreased at bases   Abdominal: Soft. Bowel sounds are normal. She exhibits distension. There is tenderness. There is no guarding.   Purulent drainage expressed from middle chevron incision + R lateral side, both areas with wd vac in place  Erythema + induration RLQ  RLQ VALORIE drain sites c/d/i with sutures   Musculoskeletal: Normal range of motion. She exhibits edema (2+ BLE, + generalized).   Neurological: She is alert and oriented to person, place, and time.   Skin: Skin is warm. She is not diaphoretic.   Psychiatric: She has a normal mood and affect.   Nursing note  and vitals reviewed.      Laboratory:  Immunosuppressants         Stop Route Frequency     tacrolimus capsule 1 mg      -- Oral 2 times daily        CBC:   Recent Labs   Lab 11/26/18  0658   WBC 5.14   RBC 2.94*   HGB 8.3*   HCT 27.0*   *   MCV 92   MCH 28.2   MCHC 30.7*     CMP:   Recent Labs   Lab 11/26/18  0658   *   CALCIUM 8.6*   ALBUMIN 2.9*   PROT 5.2*      K 3.9   CO2 27   CL 98   BUN 55*   CREATININE 1.1   ALKPHOS 135   ALT 10   AST 20   BILITOT 1.2*     Labs within the past 24 hours have been reviewed.    Diagnostic Results:  I have personally reviewed all pertinent imaging studies.

## 2018-11-26 NOTE — ASSESSMENT & PLAN NOTE
- Stopped Cellcept, Bactrim, and Valcyte 10/31   - Cellcept half dose resumed 11/6  - Started Atovaquone 11/6.   - CMV PCR noted to be positive at 129. Will hold off on treatment with valcyte at this time. Monitoring weekly CMV PCRs.  - Cellcept held 11/23 2/2 infection (UTI + wound)  - Leukopenia improving.  - Continue to monitor.

## 2018-11-26 NOTE — ASSESSMENT & PLAN NOTE
- Valcyte for CMV prophylaxis--> holding (10/31) for leukopenia. Pt will need weekly CMV PCR while discontinued.  - CMV PCR noted to be positive at 129. .  - CMV PCR 11/8: 105  - CMV PCR 11/15 with 89 copies  - CMV PCR 11/23 pending  - Bactrim for PCP prophylaxis ([facility]).--> holding (10/31) for leukopenia.   - Start Atovaquone 11/6.  - Fluconazole for fungal prophylaxis (first dose 10/6). D/c Fluc 11/16 bc pt had been on it for > 30 days post take back.

## 2018-11-26 NOTE — PLAN OF CARE
Problem: Occupational Therapy Goal  Goal: Occupational Therapy Goal  Goals to be met by: 12/8/18 ( Revised: 11/26)    Patient will increase functional independence with ADLs by performing:    UE Dressing with Supervision.  LE Dressing with Supervision.  Grooming while standing at sink with Supervision.   Toileting from toilet with Supervision for hygiene and clothing management.   Toilet transfer to toilet with Supervision.        Outcome: Ongoing (interventions implemented as appropriate)  Continue with POC.  Laura corado OT  11/26/2018

## 2018-11-26 NOTE — PT/OT/SLP PROGRESS
Occupational Therapy   Treatment    Name: Scarlett Reddy  MRN: [id number]  Admitting Diagnosis:  Liver transplanted  7 Days Post-Op    Recommendations:     Discharge Recommendations: home health PT, home health OT  Discharge Equipment Recommendations:  walker, rolling  Barriers to discharge:  None    Subjective     Pain/Comfort:  · Pain Rating 1: 0/10  · Pain Rating Post-Intervention 1: 0/10    Objective:     Communicated with: RN prior to session.  Patient found with all lines intact, call button in reach and RN notified and telemetry, pulse ox (continuous), wound vac upon OT entry to room.    General Precautions: Standard, fall, contact   Orthopedic Precautions:N/A   Braces: N/A     Occupational Performance:    Bed Mobility:    · Pt sitting UIC upon arrival    Functional Mobility/Transfers:  · Patient completed Sit <> Stand Transfer with moderate assistance  with  no assistive device   · Functional Mobility: Pt completed functional mobility in [facility] ( ~150ft) using RW and SBA- CGA for balance and safety. She tolerated well with no LOB or SOB.     Activities of Daily Living:  · Upper Body Dressing: minimum assistance to casper gown like jacket while seated UIC    [facility] 6 Click ADL: 17    Treatment & Education:  -Pt edu on OT role/POC  -Importance of OOB activity with staff assistance ( UIC throughout the day)  -Safety during functional t/f and mobility ( RW management)  - White board updated  - Multiple self care tasks completed-- assistance level noted above  - All questions/concerns answered within OT scope of practice   - Pt completed BUE and BLE AROM exercises while seated UIC including: 1x15 reps in shoulder flexion, elbow flexion/extension, chest press, and scapular squeezes; hip flexion, knee flexion/extension, hip abduction/adduction, ankle pumps. Pt tolerated well with min rest breaks between each exercise.     Patient left up in chair with all lines intact, call button in reach and RN  notified  Education:    Assessment:     Scarlett Reddy is a 68 y.o. female with a medical diagnosis of Liver transplanted.  She presents with the following performance deficits affecting function are weakness, impaired endurance, gait instability, impaired balance, impaired self care skills, impaired functional mobilty, edema.     Rehab Prognosis:  Good; patient would benefit from acute skilled OT services to address these deficits and reach maximum level of function.       Plan:     Patient to be seen 3 x/week to address the above listed problems via self-care/home management, therapeutic activities, therapeutic exercises, neuromuscular re-education  · Plan of Care Expires: 12/07/18  · Plan of Care Reviewed with: patient, spouse    This Plan of care has been discussed with the patient who was involved in its development and understands and is in agreement with the identified goals and treatment plan    GOALS:   Multidisciplinary Problems     Occupational Therapy Goals        Problem: Occupational Therapy Goal    Goal Priority Disciplines Outcome Interventions   Occupational Therapy Goal     OT, PT/OT Ongoing (interventions implemented as appropriate)    Description:  Goals to be met by: 12/8/18 ( Revised: 11/26)    Patient will increase functional independence with ADLs by performing:    UE Dressing with Supervision.  LE Dressing with Supervision.  Grooming while standing at sink with Supervision.   Toileting from toilet with Supervision for hygiene and clothing management.   Toilet transfer to toilet with Supervision.                          Time Tracking:     OT Date of Treatment: 11/26/18  OT Start Time: 0925  OT Stop Time: 0952  OT Total Time (min): 27 min    Billable Minutes:Therapeutic Exercise 27    Laura corado OT  11/26/2018

## 2018-11-27 PROBLEM — J90 PLEURAL EFFUSION: Status: ACTIVE | Noted: 2018-11-27

## 2018-11-27 LAB
ABO + RH BLD: NORMAL
ALBUMIN SERPL BCP-MCNC: 2.6 G/DL
ALP SERPL-CCNC: 115 U/L
ALT SERPL W/O P-5'-P-CCNC: 8 U/L
ANION GAP SERPL CALC-SCNC: 8 MMOL/L
AST SERPL-CCNC: 15 U/L
BASOPHILS # BLD AUTO: 0.01 K/UL
BASOPHILS NFR BLD: 0.3 %
BILIRUB SERPL-MCNC: 1.1 MG/DL
BLD GP AB SCN CELLS X3 SERPL QL: NORMAL
BUN SERPL-MCNC: 54 MG/DL
CALCIUM SERPL-MCNC: 8.2 MG/DL
CHLORIDE SERPL-SCNC: 100 MMOL/L
CO2 SERPL-SCNC: 30 MMOL/L
CREAT SERPL-MCNC: 1.1 MG/DL
DIFFERENTIAL METHOD: ABNORMAL
EOSINOPHIL # BLD AUTO: 0 K/UL
EOSINOPHIL NFR BLD: 1.1 %
ERYTHROCYTE [DISTWIDTH] IN BLOOD BY AUTOMATED COUNT: 16.2 %
EST. GFR  (AFRICAN AMERICAN): 59.2 ML/MIN/1.73 M^2
EST. GFR  (NON AFRICAN AMERICAN): 51.3 ML/MIN/1.73 M^2
GLUCOSE SERPL-MCNC: 215 MG/DL
HCT VFR BLD AUTO: 24 %
HGB BLD-MCNC: 7.6 G/DL
IMM GRANULOCYTES # BLD AUTO: 0.06 K/UL
IMM GRANULOCYTES NFR BLD AUTO: 1.7 %
LYMPHOCYTES # BLD AUTO: 0.2 K/UL
LYMPHOCYTES NFR BLD: 5.8 %
MAGNESIUM SERPL-MCNC: 1.7 MG/DL
MCH RBC QN AUTO: 29.1 PG
MCHC RBC AUTO-ENTMCNC: 31.7 G/DL
MCV RBC AUTO: 92 FL
MONOCYTES # BLD AUTO: 0.4 K/UL
MONOCYTES NFR BLD: 10 %
NEUTROPHILS # BLD AUTO: 2.9 K/UL
NEUTROPHILS NFR BLD: 81.1 %
NRBC BLD-RTO: 0 /100 WBC
PHOSPHATE SERPL-MCNC: 2.2 MG/DL
PLATELET # BLD AUTO: 98 K/UL
PMV BLD AUTO: 9.7 FL
POCT GLUCOSE: 122 MG/DL (ref 70–110)
POCT GLUCOSE: 149 MG/DL (ref 70–110)
POCT GLUCOSE: 245 MG/DL (ref 70–110)
POCT GLUCOSE: 324 MG/DL (ref 70–110)
POCT GLUCOSE: 61 MG/DL (ref 70–110)
POTASSIUM SERPL-SCNC: 4 MMOL/L
PROT SERPL-MCNC: 4.6 G/DL
RBC # BLD AUTO: 2.61 M/UL
SODIUM SERPL-SCNC: 138 MMOL/L
TACROLIMUS BLD-MCNC: 4.7 NG/ML
WBC # BLD AUTO: 3.59 K/UL

## 2018-11-27 PROCEDURE — 25000003 PHARM REV CODE 250: Performed by: NURSE PRACTITIONER

## 2018-11-27 PROCEDURE — 85025 COMPLETE CBC W/AUTO DIFF WBC: CPT

## 2018-11-27 PROCEDURE — 63600175 PHARM REV CODE 636 W HCPCS: Performed by: NURSE PRACTITIONER

## 2018-11-27 PROCEDURE — 84100 ASSAY OF PHOSPHORUS: CPT

## 2018-11-27 PROCEDURE — 86901 BLOOD TYPING SEROLOGIC RH(D): CPT

## 2018-11-27 PROCEDURE — 94664 DEMO&/EVAL PT USE INHALER: CPT

## 2018-11-27 PROCEDURE — 25000003 PHARM REV CODE 250: Performed by: PHYSICIAN ASSISTANT

## 2018-11-27 PROCEDURE — 80197 ASSAY OF TACROLIMUS: CPT

## 2018-11-27 PROCEDURE — 86922 COMPATIBILITY TEST ANTIGLOB: CPT

## 2018-11-27 PROCEDURE — 97110 THERAPEUTIC EXERCISES: CPT

## 2018-11-27 PROCEDURE — 36415 COLL VENOUS BLD VENIPUNCTURE: CPT

## 2018-11-27 PROCEDURE — 80053 COMPREHEN METABOLIC PANEL: CPT

## 2018-11-27 PROCEDURE — 97530 THERAPEUTIC ACTIVITIES: CPT

## 2018-11-27 PROCEDURE — 25000003 PHARM REV CODE 250: Performed by: TRANSPLANT SURGERY

## 2018-11-27 PROCEDURE — 20600001 HC STEP DOWN PRIVATE ROOM

## 2018-11-27 PROCEDURE — 94761 N-INVAS EAR/PLS OXIMETRY MLT: CPT

## 2018-11-27 PROCEDURE — 99900035 HC TECH TIME PER 15 MIN (STAT)

## 2018-11-27 PROCEDURE — 83735 ASSAY OF MAGNESIUM: CPT

## 2018-11-27 PROCEDURE — 99231 SBSQ HOSP IP/OBS SF/LOW 25: CPT | Mod: ,,, | Performed by: NURSE PRACTITIONER

## 2018-11-27 PROCEDURE — 99233 SBSQ HOSP IP/OBS HIGH 50: CPT | Mod: 24,,, | Performed by: PHYSICIAN ASSISTANT

## 2018-11-27 RX ORDER — FUROSEMIDE 40 MG/1
40 TABLET ORAL 2 TIMES DAILY
Status: DISCONTINUED | OUTPATIENT
Start: 2018-11-27 | End: 2018-11-27

## 2018-11-27 RX ADMIN — MAGNESIUM OXIDE TAB 400 MG (241.3 MG ELEMENTAL MG) 800 MG: 400 (241.3 MG) TAB at 08:11

## 2018-11-27 RX ADMIN — URSODIOL 300 MG: 300 CAPSULE ORAL at 07:11

## 2018-11-27 RX ADMIN — INSULIN ASPART 14 UNITS: 100 INJECTION, SOLUTION INTRAVENOUS; SUBCUTANEOUS at 08:11

## 2018-11-27 RX ADMIN — DIBASIC SODIUM PHOSPHATE, MONOBASIC POTASSIUM PHOSPHATE AND MONOBASIC SODIUM PHOSPHATE 2 TABLET: 852; 155; 130 TABLET ORAL at 08:11

## 2018-11-27 RX ADMIN — ASPIRIN 81 MG CHEWABLE TABLET 81 MG: 81 TABLET CHEWABLE at 08:11

## 2018-11-27 RX ADMIN — INSULIN ASPART 4 UNITS: 100 INJECTION, SOLUTION INTRAVENOUS; SUBCUTANEOUS at 12:11

## 2018-11-27 RX ADMIN — Medication 16 G: at 09:11

## 2018-11-27 RX ADMIN — INSULIN ASPART 14 UNITS: 100 INJECTION, SOLUTION INTRAVENOUS; SUBCUTANEOUS at 05:11

## 2018-11-27 RX ADMIN — URSODIOL 300 MG: 300 CAPSULE ORAL at 08:11

## 2018-11-27 RX ADMIN — INSULIN ASPART 2 UNITS: 100 INJECTION, SOLUTION INTRAVENOUS; SUBCUTANEOUS at 08:11

## 2018-11-27 RX ADMIN — ENTECAVIR 0.5 MG: 0.5 TABLET ORAL at 08:11

## 2018-11-27 RX ADMIN — TACROLIMUS 1 MG: 1 CAPSULE ORAL at 08:11

## 2018-11-27 RX ADMIN — ERTAPENEM SODIUM 1 G: 1 INJECTION, POWDER, LYOPHILIZED, FOR SOLUTION INTRAMUSCULAR; INTRAVENOUS at 02:11

## 2018-11-27 RX ADMIN — INSULIN ASPART 14 UNITS: 100 INJECTION, SOLUTION INTRAVENOUS; SUBCUTANEOUS at 12:11

## 2018-11-27 RX ADMIN — DIBASIC SODIUM PHOSPHATE, MONOBASIC POTASSIUM PHOSPHATE AND MONOBASIC SODIUM PHOSPHATE 2 TABLET: 852; 155; 130 TABLET ORAL at 07:11

## 2018-11-27 RX ADMIN — FUROSEMIDE 40 MG: 40 TABLET ORAL at 08:11

## 2018-11-27 RX ADMIN — MIDODRINE HYDROCHLORIDE 15 MG: 5 TABLET ORAL at 09:11

## 2018-11-27 RX ADMIN — PREDNISONE 5 MG: 5 TABLET ORAL at 08:11

## 2018-11-27 RX ADMIN — PANTOPRAZOLE SODIUM 40 MG: 40 TABLET, DELAYED RELEASE ORAL at 08:11

## 2018-11-27 RX ADMIN — MAGNESIUM OXIDE TAB 400 MG (241.3 MG ELEMENTAL MG) 800 MG: 400 (241.3 MG) TAB at 07:11

## 2018-11-27 RX ADMIN — PANTOPRAZOLE SODIUM 40 MG: 40 TABLET, DELAYED RELEASE ORAL at 07:11

## 2018-11-27 RX ADMIN — FUROSEMIDE 60 MG: 40 TABLET ORAL at 05:11

## 2018-11-27 RX ADMIN — TACROLIMUS 1 MG: 1 CAPSULE ORAL at 05:11

## 2018-11-27 RX ADMIN — ATOVAQUONE 1500 MG: 750 SUSPENSION ORAL at 08:11

## 2018-11-27 NOTE — ASSESSMENT & PLAN NOTE
- Middle chevron incision opened 11/16 with purulent drainage  - Packed with Aquacel ag at this time. Growing Klebsiella p.   - CT abd/pelvis 11/17- no signs of infection  - 11/17: R side of chevron incision w/ 3 small holes draining purulent fluid, opened and packed w/ aquacel.   - Wound care following.  - Wound w/ increased drainage, so wound care placed vac 11/24/18.  Apprec recs. Extending Ertapenem thru 11/28 per ID.    - Abdomen erythematous and indurated RLQ inferior to chevron.  - CT abd/pelvis 11/26 - no sign of infection    .

## 2018-11-27 NOTE — PROGRESS NOTES
Follow up for dressing change to chevron wound.  Right lateral aspect of incision had been opened up yesterday and will bridge the two wounds together . Plan next dressing change on 11/30/18. Discussed wounds with Dr Marmolejo on rounds.     11/27/18 1300       Incision/Site 10/06/18 1509 Abdomen transverse   Date First Assessed/Time First Assessed: 10/06/18 1509   Location: Abdomen  Orientation: transverse   Wound Image    Incision WDL ex   Dressing Appearance Clean;Intact   Drainage Amount Small   Drainage Characteristics/Odor Other (see comments)  (white cloudy exudate)   Appearance Yellow;Pink;Moist   Red (%), Wound Tissue Color 50 %  (pink)   Yellow (%), Wound Tissue Color 50 %   Periwound Area Intact   Wound Edges Open   Wound Length (cm) 0.5 cm   Wound Width (cm) 1.7 cm   Wound Depth (cm) 0.7 cm   Wound Volume (cm^3) 0.6 cm^3   Undermining (depth (cm)/location) 2cm at 3 and 9 o'clock   Care Cleansed with:;Sterile normal saline   Dressing Applied  (NPWT dressing)       Negative Pressure Wound Therapy    Placement Date/Time: 11/24/18 1000   Orientation: transverse  Location: Abdomen   NPWT Type Vacuum Therapy   Therapy Setting NPWT Continuous therapy   Pressure Setting NPWT 125 mmHg   Therapy Interventions NPWT Dressing changed   Sponges Inserted NPWT White;Black   Sponges Removed NPWT White;Black       Wound 11/27/18 1300 Other (comment) transverse Abdomen   Date First Assessed/Time First Assessed: 11/27/18 1300   Pre-existing: No  Primary Wound Type: (c) Other (comment)  Side: Right  Orientation: transverse  Location: Abdomen   Wound Image    Wound WDL ex   Dressing Appearance Moist drainage;Intact   Drainage Amount Moderate   Drainage Characteristics/Odor Serous   Appearance Pink;Moist;Yellow   Tissue loss description Full thickness   Red (%), Wound Tissue Color 80 %   Yellow (%), Wound Tissue Color 20 %   Periwound Area Intact   Wound Edges Open   Wound Length (cm) 1 cm   Wound Width (cm) 3.5 cm   Wound Depth  (cm) 1.2 cm   Wound Volume (cm^3) 4.2 cm^3   Wound Surface Area (cm^2) 3.5 cm^2   Tunneling (depth (cm)/location) tracts medially 1.5cm   Care Cleansed with:;Sterile normal saline   Dressing Applied  (NPWT dressing)     Urban Patel RN CWON  [id number]

## 2018-11-27 NOTE — ASSESSMENT & PLAN NOTE
- Stopped Cellcept, Bactrim, and Valcyte 10/31   - Cellcept half dose resumed 11/6  - Started Atovaquone 11/6.   - CMV PCR noted to be positive at 129. Will hold off on treatment with valcyte at this time. Monitoring weekly CMV PCRs.  - Cellcept held 11/23 2/2 infection (UTI + wound)  - Continue to monitor.

## 2018-11-27 NOTE — ASSESSMENT & PLAN NOTE
- Fluctuating H&H.    - Transfusions: 10/14; 10/16; 10/18; 10/19; 10/22; 10/27; 10/29, 10/31, 11/3, 11/21  - FOBT +  - LDH elevated, Hapto < 10.  - EGD 10/18: ulcerated duodenal mucosa.   - Consulted GI about this ongoing issue and further need of another EGD.  - EGD 11/5 unremarkable.   - Iron studies 11/13:  95, TIBC 107, sat iron 89, transferrin 72, ferritin pending  - Hemolytic anemia workup 11/13: Haptoglobin 132, retic 3.9   - Last transfused 1upRBC 11/21. H/H now improved.   - H/H decreased to 7.6/24. Ordered type and screen, but no transfusion planned for today.   - Continue to monitor.

## 2018-11-27 NOTE — ASSESSMENT & PLAN NOTE
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   - POD 1 US: increased arterial resistive indices, suggestive of edema vs acute rejection vs congestion.  - Repeat US 10/9 with continued elevation of RIs.  - LFTs stable.  - T bili trending down.   - Repeat liver US (10/11): elevated RIs.  - Liver US 11/6 to assess for ascites. No arterial flow within the liver allograft concerning for interval thrombosis/occlusion of the arterial system. Severe ascites seen.  - Liver US 11/8: arterial flow seen w/ elevated RIs   - LFTs remain stable. No CTA at this time due to EVA and normal liver function.   - Liver US 11/15 showed "abnormally decreased main hepatic artery peak systolic velocity with slow arterial flow and abnormal tardus parvus waveforms intrahepatically."   -Vascular surgery consulted  - Liver US 11/19 showed no arterial flow on the right and sluggish flow on the left.   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 No further intervention planned at this time as patient's LFTs are stable. Will monitor.  - CT abd/pelvis 11/26 showed: 1. Persistent large volume of ascites. 2. Large right and moderate left pleural effusions with adjacent basilar atelectasis. 3. Anasarca      "

## 2018-11-27 NOTE — PT/OT/SLP PROGRESS
Physical Therapy Treatment    Patient Name:  Scarlett Reddy   MRN:  [id number]    Recommendations:     Discharge Recommendations:  home health PT, home health OT   Discharge Equipment Recommendations: walker, rolling   Barriers to discharge: None    Assessment:     Scarlett Reddy is a 68 y.o. female admitted with a medical diagnosis of Liver transplanted.  She presents with the following impairments/functional limitations:  weakness, gait instability, impaired endurance, impaired balance, impaired self care skills, impaired functional mobilty, edema, pain. Pt continues to require increased assist to complete transfer from sit>stand; difficulty noted with weight-shifting anteriorly during transfer. Able to ambulate in hallway but unable to progress distance further 2* generalized weakness and impaired endurance. Pt would continue to benefit from skilled acute PT in order to address current deficits and progress functional mobility.     Rehab Prognosis:  good; patient would benefit from acute skilled PT services to address these deficits and reach maximum level of function.      Recent Surgery: Procedure(s) (LRB):  ANGIOGRAM-HEPATIC (Left) 8 Days Post-Op    Plan:     During this hospitalization, patient to be seen 4 x/week to address the above listed problems via gait training, therapeutic activities, therapeutic exercises, neuromuscular re-education  · Plan of Care Expires:  12/15/18   Plan of Care Reviewed with: patient    Subjective     Communicated with RN prior to session.  Patient found UIC upon PT entry to room, agreeable to treatment.      Chief Complaint: abdominal discomfort   Patient comments/goals: Pt reports that she performs her leg exercises daily.   Pain/Comfort:  · Pain Rating 1: (reported abdominal pain but did not rate)  · Pain Addressed 2: Reposition, Distraction    Patients cultural, spiritual, Alevism conflicts given the current situation: none noted     Objective:     Patient found with:  telemetry, pulse ox (continuous)     General Precautions: Standard, fall, contact   Orthopedic Precautions:N/A   Braces: N/A     Functional Mobility:  · Transfers:     · Sit to Stand:  moderate assistance with rolling walker  · Increased difficulty with appropriate anterior weight-shift   · Gait: 116 ft. with RW and CGA  ? Decreased giuseppe, decreased step length, impaired weight-shifting ability, decreased toe-floor clearance  ? Multiple, intermittent standing rest breaks throughout gait       AM-PAC 6 CLICK MOBILITY  Turning over in bed (including adjusting bedclothes, sheets and blankets)?: 3  Sitting down on and standing up from a chair with arms (e.g., wheelchair, bedside commode, etc.): 2  Moving from lying on back to sitting on the side of the bed?: 2  Moving to and from a bed to a chair (including a wheelchair)?: 3  Need to walk in hospital room?: 3  Climbing 3-5 steps with a railing?: 2  Basic Mobility Total Score: 15       Therapeutic Activities and Exercises:   present to assist with translation throughout.  Performed BLE therex x10 reps each: AP, LAQ, hip flex, hip add. Pt instructed to continue performing therex (I) daily. Pt v/u.   Pt educated on attempting to increase (I) with sit<>stand transfers when performing with her . Pt v/u.   Discussed d/c recs for HH and RW. Pt agreeable to recs.      Patient left up in chair with all lines intact, call button in reach and pt's  and  present..    GOALS:   Multidisciplinary Problems     Physical Therapy Goals        Problem: Physical Therapy Goal    Goal Priority Disciplines Outcome Goal Variances Interventions   Physical Therapy Goal     PT, PT/OT Ongoing (interventions implemented as appropriate)     Description:  Goals to be met by: 2018    Patient will increase functional independence with mobility by performin. Supine to sit with Contact Guard Assistance - not met  2. Sit to stand transfer with Supervision  using LRAD or no AD - not met  3. Bed to chair transfer with Supervision using LRAD or no AD - not met  4. Gait  x 250 feet with Supervision using LRAD or no AD - not met  5. Stand for 3 minutes with Supervision to increase activity tolerance - not met   6. Lower extremity exercise program x15 reps per handout, with independence - not met                            Time Tracking:     PT Received On: 11/27/18  PT Start Time: 1058     PT Stop Time: 1121  PT Total Time (min): 23 min     Billable Minutes: Therapeutic Activity 15 and Therapeutic Exercise 8    Treatment Type: Treatment  PT/PTA: PT     PTA Visit Number: 0     Radha Nguyen, PT, DPT   11/27/2018  [phone]

## 2018-11-27 NOTE — PLAN OF CARE
Problem: Physical Therapy Goal  Goal: Physical Therapy Goal  Goals to be met by: 2018    Patient will increase functional independence with mobility by performin. Supine to sit with Contact Guard Assistance - not met  2. Sit to stand transfer with Supervision using LRAD or no AD - not met  3. Bed to chair transfer with Supervision using LRAD or no AD - not met  4. Gait  x 250 feet with Supervision using LRAD or no AD - not met  5. Stand for 3 minutes with Supervision to increase activity tolerance - not met   6. Lower extremity exercise program x15 reps per handout, with independence - not met          Outcome: Ongoing (interventions implemented as appropriate)  Goals reviewed and remain appropriate. Pt progressing towards goals.    Radha Nguyen, PT, DPT   2018  [phone]

## 2018-11-27 NOTE — PROGRESS NOTES
"[facility]  Endocrinology  Progress Note    Admit Date: 10/1/2018     Reason for Consult: Management of type 2 DM, Hyperglycemia      Surgical Procedure and Date: Liver transplant 10/5/18, Ex lap 10/6/18     Diabetes diagnosis year:      Home Diabetes Medications:  Lantus 18 units HS and novolog 17 units with meals plus correction scale (150-200 +1)        Lab Results   Component Value Date     HGBA1C 6.8 (H) 2018        How often checking glucose at home? 3-4   BG readings on regimen: 120-150.  Post prandial readings as high as upper 200s  Hypoglycemia on the regimen? yes, none recently   Missed doses on regimen?  No     Diabetes Complications include:     Diabetic peripheral neuropathy      Complicating diabetes co morbidities:   CIRRHOSIS        HPI:  Patient is a 68 y.o. female with a diagnosis of ESLD, listed for liver transplant with meld 23 who underwent live transplant on 10/5/18.  Back to OR on 10/6/18 for ex lap.  Admitted 10/1/18 for hyponatremia s/p paracentesis.  Personal has known diagnosis of diabetes, endocrine consulted for diabetes management.    Post-operative course complicated by ESBL Klebsiella pneumoniae in urine (in contact isolation), peritransplant ascites, worsening renal function (required temporary dialysis), anemia (multiple blood transfusions), and possible GI bleed.     Interval HPI:   Overnight events: Remains in TSU. Wound vac removed yesterday, CT abdomen and pelvis yesterday afternoon.  On IV antibiotics.  Prednisone 5 mg daily, standard steroid taper.  BG above goal  this am.   Eatin%  Nausea: No  Hypoglycemia and intervention: No  Fever: No  TPN and/or TF: No    /66   Pulse 107   Temp 98.4 °F (36.9 °C) (Oral)   Resp 19   Ht 5' 3" (1.6 m)   Wt 60 kg (132 lb 4.4 oz)   LMP  (LMP Unknown)   SpO2 100%   Breastfeeding? No   BMI 23.43 kg/m²      Labs Reviewed and Include    Recent Labs   Lab 18  0655   *   CALCIUM 8.2* "   ALBUMIN 2.6*   PROT 4.6*      K 4.0   CO2 30*      BUN 54*   CREATININE 1.1   ALKPHOS 115   ALT 8*   AST 15   BILITOT 1.1*     Lab Results   Component Value Date    WBC 3.59 (L) 11/27/2018    HGB 7.6 (L) 11/27/2018    HCT 24.0 (L) 11/27/2018    MCV 92 11/27/2018    PLT 98 (L) 11/27/2018     No results for input(s): TSH, FREET4 in the last 168 hours.  Lab Results   Component Value Date    HGBA1C 6.0 (H) 10/04/2018       Nutritional status:   Body mass index is 23.43 kg/m².  Lab Results   Component Value Date    ALBUMIN 2.6 (L) 11/27/2018    ALBUMIN 2.9 (L) 11/26/2018    ALBUMIN 2.9 (L) 11/25/2018     Lab Results   Component Value Date    PREALBUMIN 15 (L) 11/03/2018    PREALBUMIN 12 (L) 09/17/2018       Estimated Creatinine Clearance: 39.9 mL/min (based on SCr of 1.1 mg/dL).    Accu-Checks  Recent Labs     11/25/18  1259 11/25/18  1801 11/25/18  2156 11/25/18  2228 11/26/18  0326 11/26/18  0750 11/26/18  1138 11/26/18  1625 11/26/18  2156 11/27/18  0831   POCTGLUCOSE 294* 235* 78 91 165* 204* 223* 193* 122* 245*       Current Medications and/or Treatments Impacting Glycemic Control  Immunotherapy:    Immunosuppressants         Stop Route Frequency     tacrolimus capsule 1 mg      -- Oral 2 times daily        Steroids:   Hormones (From admission, onward)    Start     Stop Route Frequency Ordered    12/01/18 0900  predniSONE tablet 2.5 mg      12/08 0859 Oral Daily 11/05/18 1004    11/24/18 0900  predniSONE tablet 5 mg      12/01 0859 Oral Daily 11/05/18 1004    10/17/18 0945  predniSONE tablet 15 mg      10/24 0859 Oral Daily 10/17/18 0943        Pressors:    Autonomic Drugs (From admission, onward)    Start     Stop Route Frequency Ordered    10/10/18 1500  midodrine tablet 15 mg      -- Oral 3 times daily 10/10/18 1122    10/06/18 1617  rocuronium (ZEMURON) 10 mg/mL injection     Comments:  Created by cabinet override    10/07 0429   10/06/18 1617        Hyperglycemia/Diabetes Medications:    Antihyperglycemics (From admission, onward)    Start     Stop Route Frequency Ordered    11/25/18 1130  insulin aspart U-100 pen 7-14 Units      -- SubQ 3 times daily with meals 11/25/18 0930    10/14/18 0925  insulin aspart U-100 pen 0-5 Units      -- SubQ Before meals & nightly PRN 10/14/18 0825    10/07/18 1200  insulin regular (Humulin R) 100 Units in sodium chloride 0.9% 100 mL infusion      10/13 2100 IV Continuous 10/07/18 1055          ASSESSMENT and PLAN    * Liver transplanted    Managed per primary team  Avoid hypoglycemia    Recent Labs   Lab 11/25/18  0847   ALT 7*   AST 15   ALKPHOS 107   BILITOT 1.4*   PROT 5.2*   ALBUMIN 2.9*            Type 2 diabetes mellitus with diabetic polyneuropathy, with long-term current use of insulin    BG goal 140-180    Continue Novolog 7 units with meals if BG <120 (or call endocrine) or 14 units with meals if >120  Low dose correction scale given kidney function.  BG monitoring AC/HS    Start self/caregiver administration of insulin to review for home.     Discharge planning:  TBD     Severe malnutrition    Oral intake encouraged, may affect BG readings     UTI (urinary tract infection)    Infection may increase insulin resistance.          Prophylactic immunotherapy    May increase insulin resistance.        Alteration in skin integrity    Infection may increase insulin resistance.   Wound vac  IV antibiotics   Optimize BG control.        EVA (acute kidney injury)    Avoid insulin stacking and hypoglycemia.  Lab Results   Component Value Date    CREATININE 1.1 11/27/2018              Be Willams NP  Endocrinology  [facility]

## 2018-11-27 NOTE — ASSESSMENT & PLAN NOTE
- No plans for RRT in near future, per nephrology  - Trialysis line removed 11/13  - Nephrology involved. Appreciate recs.  - Cr improving daily.   Monitor.  - Decreased lasix from 80 mg to 40 mg 11/25.   - Will give extra 60 mg of Lasix 11/27 for a total of 100 mg (increased abd weeping, pleural effusions on CT)  - Continue to monitor.

## 2018-11-27 NOTE — SUBJECTIVE & OBJECTIVE
"Interval HPI:   Overnight events: Remains in TSU. Wound vac removed yesterday, CT abdomen and pelvis yesterday afternoon.  On IV antibiotics.  Prednisone 5 mg daily, standard steroid taper.  BG above goal this am.   Eatin%  Nausea: No  Hypoglycemia and intervention: No  Fever: No  TPN and/or TF: No    /66   Pulse 107   Temp 98.4 °F (36.9 °C) (Oral)   Resp 19   Ht 5' 3" (1.6 m)   Wt 60 kg (132 lb 4.4 oz)   LMP  (LMP Unknown)   SpO2 100%   Breastfeeding? No   BMI 23.43 kg/m²     Labs Reviewed and Include    Recent Labs   Lab 18  0655   *   CALCIUM 8.2*   ALBUMIN 2.6*   PROT 4.6*      K 4.0   CO2 30*      BUN 54*   CREATININE 1.1   ALKPHOS 115   ALT 8*   AST 15   BILITOT 1.1*     Lab Results   Component Value Date    WBC 3.59 (L) 2018    HGB 7.6 (L) 2018    HCT 24.0 (L) 2018    MCV 92 2018    PLT 98 (L) 2018     No results for input(s): TSH, FREET4 in the last 168 hours.  Lab Results   Component Value Date    HGBA1C 6.0 (H) 10/04/2018       Nutritional status:   Body mass index is 23.43 kg/m².  Lab Results   Component Value Date    ALBUMIN 2.6 (L) 2018    ALBUMIN 2.9 (L) 2018    ALBUMIN 2.9 (L) 2018     Lab Results   Component Value Date    PREALBUMIN 15 (L) 2018    PREALBUMIN 12 (L) 2018       Estimated Creatinine Clearance: 39.9 mL/min (based on SCr of 1.1 mg/dL).    Accu-Checks  Recent Labs     18  1259 18  1801 18  2156 18  2228 18  0326 18  0750 18  1138 18  1625 18  2156 18  0831   POCTGLUCOSE 294* 235* 78 91 165* 204* 223* 193* 122* 245*       Current Medications and/or Treatments Impacting Glycemic Control  Immunotherapy:    Immunosuppressants         Stop Route Frequency     tacrolimus capsule 1 mg      -- Oral 2 times daily        Steroids:   Hormones (From admission, onward)    Start     Stop Route Frequency Ordered    18 0900  predniSONE " tablet 2.5 mg      12/08 0859 Oral Daily 11/05/18 1004    11/24/18 0900  predniSONE tablet 5 mg      12/01 0859 Oral Daily 11/05/18 1004    10/17/18 0945  predniSONE tablet 15 mg      10/24 0859 Oral Daily 10/17/18 0943        Pressors:    Autonomic Drugs (From admission, onward)    Start     Stop Route Frequency Ordered    10/10/18 1500  midodrine tablet 15 mg      -- Oral 3 times daily 10/10/18 1122    10/06/18 1617  rocuronium (ZEMURON) 10 mg/mL injection     Comments:  Created by cabinet override    10/07 0429   10/06/18 1617        Hyperglycemia/Diabetes Medications:   Antihyperglycemics (From admission, onward)    Start     Stop Route Frequency Ordered    11/25/18 1130  insulin aspart U-100 pen 7-14 Units      -- SubQ 3 times daily with meals 11/25/18 0930    10/14/18 0925  insulin aspart U-100 pen 0-5 Units      -- SubQ Before meals & nightly PRN 10/14/18 0825    10/07/18 1200  insulin regular (Humulin R) 100 Units in sodium chloride 0.9% 100 mL infusion      10/13 2100 IV Continuous 10/07/18 1055

## 2018-11-27 NOTE — ASSESSMENT & PLAN NOTE
- CRRT 10/14, 10/17, 10/20, 10/31.  - CXR 10/30: abundant fluid in b/l pleural space.  - Will give extra 60 mg of Lasix 11/27 for a total of 100 mg due signs of worsening edema (increased abd weeping, pleural effusions on CT)  - Monitor daily weights.

## 2018-11-27 NOTE — ASSESSMENT & PLAN NOTE
- Common post liver transplant  - CT 11/26 showed: Large right and moderate left pleural effusions with adjacent basilar atelectasis.  - Pt sp02 stable on RA, but c/o some orthopnea.  - Continue w/ diuresis.

## 2018-11-27 NOTE — SUBJECTIVE & OBJECTIVE
Scheduled Meds:   aspirin  81 mg Oral Daily    atovaquone  1,500 mg Oral Daily    entecavir  0.5 mg Oral Daily    ertapenem (INVANZ) IVPB  1 g Intravenous Q24H    furosemide  40 mg Oral Daily    furosemide  60 mg Oral BID    heparin (porcine)  5,000 Units Subcutaneous Q8H    insulin aspart U-100  7-14 Units Subcutaneous TIDWM    k phos di & mono-sod phos mono  2 tablet Oral BID    magnesium oxide  800 mg Oral BID    midodrine  15 mg Oral TID    pantoprazole  40 mg Oral BID    predniSONE  5 mg Oral Daily    Followed by    [START ON 12/1/2018] predniSONE  2.5 mg Oral Daily    tacrolimus  1 mg Oral BID    ursodiol  300 mg Oral BID     Continuous Infusions:  PRN Meds:acetaminophen, bisacodyl, calcium carbonate, dextrose 50%, dextrose 50%, glucagon (human recombinant), glucose, glucose, insulin aspart U-100, omnipaque, ondansetron, ondansetron, polyethylene glycol, sodium chloride 0.9%, traMADol    Review of Systems   Constitutional: Positive for activity change and appetite change (improving). Negative for chills, fatigue and fever.   HENT: Negative for congestion, facial swelling and trouble swallowing.    Eyes: Negative for pain, discharge and visual disturbance.   Respiratory: Negative for cough, chest tightness, shortness of breath and wheezing.    Cardiovascular: Positive for leg swelling. Negative for chest pain and palpitations.   Gastrointestinal: Positive for abdominal distention and abdominal pain. Negative for constipation, diarrhea, nausea and vomiting.   Endocrine: Negative.    Genitourinary: Negative for decreased urine volume, difficulty urinating and dysuria.   Musculoskeletal: Positive for back pain. Negative for arthralgias, neck pain and neck stiffness.   Skin: Positive for wound.   Allergic/Immunologic: Positive for immunocompromised state.   Neurological: Positive for weakness. Negative for tremors and headaches.   Psychiatric/Behavioral: Negative for confusion and sleep disturbance.      Objective:     Vital Signs (Most Recent):  Temp: 98.4 °F (36.9 °C) (11/27/18 0832)  Pulse: (!) 119 (11/27/18 1004)  Resp: 19 (11/27/18 1004)  BP: 133/75 (11/27/18 1004)  SpO2: 98 % (11/27/18 1004) Vital Signs (24h Range):  Temp:  [97.9 °F (36.6 °C)-98.4 °F (36.9 °C)] 98.4 °F (36.9 °C)  Pulse:  [] 119  Resp:  [13-19] 19  SpO2:  [98 %-100 %] 98 %  BP: (103-137)/(60-81) 133/75     Weight: 60 kg (132 lb 4.4 oz)  Body mass index is 23.43 kg/m².    Intake/Output - Last 3 Shifts       11/25 0700 - 11/26 0659 11/26 0700 - 11/27 0659 11/27 0700 - 11/28 0659    P.O. 1000 1000     I.V. (mL/kg)  100 (1.7)     IV Piggyback  200     Total Intake(mL/kg) 1000 (16.7) 1300 (21.7)     Urine (mL/kg/hr) 0 (0)      Other 150      Stool 0      Total Output 150      Net +850 +1300            Urine Occurrence 4 x 7 x     Stool Occurrence 3 x 3 x     Emesis Occurrence  0 x           Physical Exam   Constitutional: She is oriented to person, place, and time. She appears well-developed. No distress.   Temporal and distal extremity muscle wasting   HENT:   Head: Normocephalic and atraumatic.   Eyes: EOM are normal. No scleral icterus.   Neck: Normal range of motion.   Cardiovascular: Normal rate, regular rhythm and normal heart sounds.   No murmur heard.  Pulmonary/Chest: Effort normal. No respiratory distress. She has no wheezes.   Breath sounds decreased at bases   Abdominal: Soft. Bowel sounds are normal. She exhibits distension. There is tenderness. There is no guarding.   Purulent drainage expressed from middle chevron incision + R lateral side, both areas packed w/ wound vac to be replaced today  Erythema + induration RLQ, improving  RLQ VALORIE drain sites c/d/i with sutures   Musculoskeletal: Normal range of motion. She exhibits edema (2+ BLE, + generalized).   Neurological: She is alert and oriented to person, place, and time.   Skin: Skin is warm. She is not diaphoretic.   Psychiatric: She has a normal mood and affect.   Nursing  note and vitals reviewed.      Laboratory:  Immunosuppressants         Stop Route Frequency     tacrolimus capsule 1 mg      -- Oral 2 times daily        CBC:   Recent Labs   Lab 11/27/18  0655   WBC 3.59*   RBC 2.61*   HGB 7.6*   HCT 24.0*   PLT 98*   MCV 92   MCH 29.1   MCHC 31.7*     CMP:   Recent Labs   Lab 11/27/18  0655   *   CALCIUM 8.2*   ALBUMIN 2.6*   PROT 4.6*      K 4.0   CO2 30*      BUN 54*   CREATININE 1.1   ALKPHOS 115   ALT 8*   AST 15   BILITOT 1.1*     Labs within the past 24 hours have been reviewed.    Diagnostic Results:  I have personally reviewed all pertinent imaging studies.Scheduled Meds:   aspirin  81 mg Oral Daily    atovaquone  1,500 mg Oral Daily    entecavir  0.5 mg Oral Daily    ertapenem (INVANZ) IVPB  1 g Intravenous Q24H    furosemide  40 mg Oral Daily    furosemide  60 mg Oral BID    heparin (porcine)  5,000 Units Subcutaneous Q8H    insulin aspart U-100  7-14 Units Subcutaneous TIDWM    k phos di & mono-sod phos mono  2 tablet Oral BID    magnesium oxide  800 mg Oral BID    midodrine  15 mg Oral TID    pantoprazole  40 mg Oral BID    predniSONE  5 mg Oral Daily    Followed by    [START ON 12/1/2018] predniSONE  2.5 mg Oral Daily    tacrolimus  1 mg Oral BID    ursodiol  300 mg Oral BID     Continuous Infusions:  PRN Meds:acetaminophen, bisacodyl, calcium carbonate, dextrose 50%, dextrose 50%, glucagon (human recombinant), glucose, glucose, insulin aspart U-100, omnipaque, ondansetron, ondansetron, polyethylene glycol, sodium chloride 0.9%, traMADol    Review of Systems  Objective:     Vital Signs (Most Recent):  Temp: 98.4 °F (36.9 °C) (11/27/18 0832)  Pulse: (!) 119 (11/27/18 1004)  Resp: 19 (11/27/18 1004)  BP: 133/75 (11/27/18 1004)  SpO2: 98 % (11/27/18 1004) Vital Signs (24h Range):  Temp:  [97.9 °F (36.6 °C)-98.4 °F (36.9 °C)] 98.4 °F (36.9 °C)  Pulse:  [] 119  Resp:  [13-19] 19  SpO2:  [98 %-100 %] 98 %  BP: (103-137)/(60-81)  133/75     Weight: 60 kg (132 lb 4.4 oz)  Body mass index is 23.43 kg/m².    Intake/Output - Last 3 Shifts       11/25 0700 - 11/26 0659 11/26 0700 - 11/27 0659 11/27 0700 - 11/28 0659    P.O. 1000 1000     I.V. (mL/kg)  100 (1.7)     IV Piggyback  200     Total Intake(mL/kg) 1000 (16.7) 1300 (21.7)     Urine (mL/kg/hr) 0 (0)      Other 150      Stool 0      Total Output 150      Net +850 +1300            Urine Occurrence 4 x 7 x     Stool Occurrence 3 x 3 x     Emesis Occurrence  0 x           Physical Exam    Laboratory:  Immunosuppressants         Stop Route Frequency     tacrolimus capsule 1 mg      -- Oral 2 times daily        CBC:   Recent Labs   Lab 11/27/18  0655   WBC 3.59*   RBC 2.61*   HGB 7.6*   HCT 24.0*   PLT 98*   MCV 92   MCH 29.1   MCHC 31.7*     CMP:   Recent Labs   Lab 11/27/18  0655   *   CALCIUM 8.2*   ALBUMIN 2.6*   PROT 4.6*      K 4.0   CO2 30*      BUN 54*   CREATININE 1.1   ALKPHOS 115   ALT 8*   AST 15   BILITOT 1.1*     Coagulation: No results for input(s): PT, INR, APTT in the last 168 hours.  Labs within the past 24 hours have been reviewed.    Diagnostic Results:  I have personally reviewed all pertinent imaging studies.

## 2018-11-27 NOTE — ASSESSMENT & PLAN NOTE
"- Expected post transplant  - H&H cont to fluctuate.   - FOBT +.  - EGD 10/18 - showed diffuse bleeding with ulcerated duodenal mucosa.  - EGD 11/5 - improvement seen, no active bleeding.  - Resume heparin sq injections 11/6.   - Decrease heparin sq from TID to BID 11/15   - Heparin dc'd 11/17  - See "anemia requiring transfusions."  - Continue to monitor.         "

## 2018-11-27 NOTE — ASSESSMENT & PLAN NOTE
- Dietary consulted.   - Temporal and distal extremity muscle wasting and edema.  - Encourage supplements and to increase nutritional intake.  - Per nephrology, pt to be on low protein diet.  - Prealbumin reviewed.   - Tolerating diet.  Denies n/v.    - Renal fx improved and stable. Told pt it's ok to resume outside protein shakes, but will monitor closely.

## 2018-11-28 LAB
ALBUMIN SERPL BCP-MCNC: 2.6 G/DL
ALP SERPL-CCNC: 132 U/L
ALT SERPL W/O P-5'-P-CCNC: 10 U/L
ANION GAP SERPL CALC-SCNC: 9 MMOL/L
AST SERPL-CCNC: 18 U/L
BACTERIA SPEC ANAEROBE CULT: NORMAL
BASOPHILS # BLD AUTO: 0.01 K/UL
BASOPHILS NFR BLD: 0.3 %
BILIRUB SERPL-MCNC: 1.1 MG/DL
BUN SERPL-MCNC: 55 MG/DL
CALCIUM SERPL-MCNC: 8.3 MG/DL
CHLORIDE SERPL-SCNC: 99 MMOL/L
CO2 SERPL-SCNC: 30 MMOL/L
CREAT SERPL-MCNC: 1.2 MG/DL
DIFFERENTIAL METHOD: ABNORMAL
EOSINOPHIL # BLD AUTO: 0.1 K/UL
EOSINOPHIL NFR BLD: 1.4 %
ERYTHROCYTE [DISTWIDTH] IN BLOOD BY AUTOMATED COUNT: 16.2 %
EST. GFR  (AFRICAN AMERICAN): 53.3 ML/MIN/1.73 M^2
EST. GFR  (NON AFRICAN AMERICAN): 46.2 ML/MIN/1.73 M^2
GLUCOSE SERPL-MCNC: 203 MG/DL
HCT VFR BLD AUTO: 23.8 %
HGB BLD-MCNC: 7.5 G/DL
IMM GRANULOCYTES # BLD AUTO: 0.1 K/UL
IMM GRANULOCYTES NFR BLD AUTO: 2.9 %
LYMPHOCYTES # BLD AUTO: 0.2 K/UL
LYMPHOCYTES NFR BLD: 6 %
MAGNESIUM SERPL-MCNC: 1.5 MG/DL
MCH RBC QN AUTO: 29 PG
MCHC RBC AUTO-ENTMCNC: 31.5 G/DL
MCV RBC AUTO: 92 FL
MONOCYTES # BLD AUTO: 0.3 K/UL
MONOCYTES NFR BLD: 9.4 %
NEUTROPHILS # BLD AUTO: 2.8 K/UL
NEUTROPHILS NFR BLD: 80 %
NRBC BLD-RTO: 0 /100 WBC
PHOSPHATE SERPL-MCNC: 2.5 MG/DL
PLATELET # BLD AUTO: 115 K/UL
PMV BLD AUTO: 10.4 FL
POCT GLUCOSE: 126 MG/DL (ref 70–110)
POCT GLUCOSE: 130 MG/DL (ref 70–110)
POCT GLUCOSE: 204 MG/DL (ref 70–110)
POCT GLUCOSE: 238 MG/DL (ref 70–110)
POCT GLUCOSE: 269 MG/DL (ref 70–110)
POCT GLUCOSE: 89 MG/DL (ref 70–110)
POTASSIUM SERPL-SCNC: 3.6 MMOL/L
PROT SERPL-MCNC: 4.8 G/DL
RBC # BLD AUTO: 2.59 M/UL
SODIUM SERPL-SCNC: 138 MMOL/L
TACROLIMUS BLD-MCNC: 4.4 NG/ML
WBC # BLD AUTO: 3.5 K/UL

## 2018-11-28 PROCEDURE — 80053 COMPREHEN METABOLIC PANEL: CPT

## 2018-11-28 PROCEDURE — 25000003 PHARM REV CODE 250: Performed by: NURSE PRACTITIONER

## 2018-11-28 PROCEDURE — 99900035 HC TECH TIME PER 15 MIN (STAT)

## 2018-11-28 PROCEDURE — 83735 ASSAY OF MAGNESIUM: CPT

## 2018-11-28 PROCEDURE — 25000003 PHARM REV CODE 250: Performed by: PHYSICIAN ASSISTANT

## 2018-11-28 PROCEDURE — P9047 ALBUMIN (HUMAN), 25%, 50ML: HCPCS | Mod: JG | Performed by: PHYSICIAN ASSISTANT

## 2018-11-28 PROCEDURE — 99233 SBSQ HOSP IP/OBS HIGH 50: CPT | Mod: 24,,, | Performed by: PHYSICIAN ASSISTANT

## 2018-11-28 PROCEDURE — 63600175 PHARM REV CODE 636 W HCPCS: Performed by: NURSE PRACTITIONER

## 2018-11-28 PROCEDURE — 25000003 PHARM REV CODE 250: Performed by: TRANSPLANT SURGERY

## 2018-11-28 PROCEDURE — 80197 ASSAY OF TACROLIMUS: CPT

## 2018-11-28 PROCEDURE — 63600175 PHARM REV CODE 636 W HCPCS: Mod: JG | Performed by: PHYSICIAN ASSISTANT

## 2018-11-28 PROCEDURE — 36415 COLL VENOUS BLD VENIPUNCTURE: CPT

## 2018-11-28 PROCEDURE — 85025 COMPLETE CBC W/AUTO DIFF WBC: CPT

## 2018-11-28 PROCEDURE — 63600175 PHARM REV CODE 636 W HCPCS: Performed by: PHYSICIAN ASSISTANT

## 2018-11-28 PROCEDURE — 84100 ASSAY OF PHOSPHORUS: CPT

## 2018-11-28 PROCEDURE — 99233 SBSQ HOSP IP/OBS HIGH 50: CPT | Mod: ,,, | Performed by: NURSE PRACTITIONER

## 2018-11-28 PROCEDURE — 94761 N-INVAS EAR/PLS OXIMETRY MLT: CPT

## 2018-11-28 PROCEDURE — 20600001 HC STEP DOWN PRIVATE ROOM

## 2018-11-28 PROCEDURE — 94664 DEMO&/EVAL PT USE INHALER: CPT

## 2018-11-28 RX ORDER — ALBUMIN HUMAN 250 G/1000ML
25 SOLUTION INTRAVENOUS ONCE
Status: COMPLETED | OUTPATIENT
Start: 2018-11-28 | End: 2018-11-28

## 2018-11-28 RX ORDER — INSULIN ASPART 100 [IU]/ML
7-14 INJECTION, SOLUTION INTRAVENOUS; SUBCUTANEOUS
Status: DISCONTINUED | OUTPATIENT
Start: 2018-11-28 | End: 2018-12-03

## 2018-11-28 RX ORDER — FUROSEMIDE 40 MG/1
40 TABLET ORAL 2 TIMES DAILY
Status: DISCONTINUED | OUTPATIENT
Start: 2018-11-28 | End: 2018-12-05

## 2018-11-28 RX ORDER — INSULIN ASPART 100 [IU]/ML
6-12 INJECTION, SOLUTION INTRAVENOUS; SUBCUTANEOUS
Status: DISCONTINUED | OUTPATIENT
Start: 2018-11-28 | End: 2018-11-28

## 2018-11-28 RX ADMIN — TACROLIMUS 1 MG: 1 CAPSULE ORAL at 05:11

## 2018-11-28 RX ADMIN — MAGNESIUM OXIDE TAB 400 MG (241.3 MG ELEMENTAL MG) 800 MG: 400 (241.3 MG) TAB at 07:11

## 2018-11-28 RX ADMIN — MAGNESIUM OXIDE TAB 400 MG (241.3 MG ELEMENTAL MG) 800 MG: 400 (241.3 MG) TAB at 09:11

## 2018-11-28 RX ADMIN — URSODIOL 300 MG: 300 CAPSULE ORAL at 07:11

## 2018-11-28 RX ADMIN — ASPIRIN 81 MG CHEWABLE TABLET 81 MG: 81 TABLET CHEWABLE at 09:11

## 2018-11-28 RX ADMIN — FUROSEMIDE 40 MG: 40 TABLET ORAL at 05:11

## 2018-11-28 RX ADMIN — ACETAMINOPHEN 325 MG: 325 TABLET, FILM COATED ORAL at 05:11

## 2018-11-28 RX ADMIN — FUROSEMIDE 40 MG: 40 TABLET ORAL at 09:11

## 2018-11-28 RX ADMIN — DIBASIC SODIUM PHOSPHATE, MONOBASIC POTASSIUM PHOSPHATE AND MONOBASIC SODIUM PHOSPHATE 2 TABLET: 852; 155; 130 TABLET ORAL at 09:11

## 2018-11-28 RX ADMIN — INSULIN ASPART 14 UNITS: 100 INJECTION, SOLUTION INTRAVENOUS; SUBCUTANEOUS at 01:11

## 2018-11-28 RX ADMIN — DIBASIC SODIUM PHOSPHATE, MONOBASIC POTASSIUM PHOSPHATE AND MONOBASIC SODIUM PHOSPHATE 2 TABLET: 852; 155; 130 TABLET ORAL at 07:11

## 2018-11-28 RX ADMIN — PANTOPRAZOLE SODIUM 40 MG: 40 TABLET, DELAYED RELEASE ORAL at 09:11

## 2018-11-28 RX ADMIN — ERTAPENEM SODIUM 1 G: 1 INJECTION, POWDER, LYOPHILIZED, FOR SOLUTION INTRAMUSCULAR; INTRAVENOUS at 03:11

## 2018-11-28 RX ADMIN — PANTOPRAZOLE SODIUM 40 MG: 40 TABLET, DELAYED RELEASE ORAL at 07:11

## 2018-11-28 RX ADMIN — ALBUMIN HUMAN 25 G: 0.25 SOLUTION INTRAVENOUS at 02:11

## 2018-11-28 RX ADMIN — HEPARIN SODIUM 5000 UNITS: 5000 INJECTION, SOLUTION INTRAVENOUS; SUBCUTANEOUS at 10:11

## 2018-11-28 RX ADMIN — TACROLIMUS 1 MG: 1 CAPSULE ORAL at 09:11

## 2018-11-28 RX ADMIN — INSULIN ASPART 14 UNITS: 100 INJECTION, SOLUTION INTRAVENOUS; SUBCUTANEOUS at 06:11

## 2018-11-28 RX ADMIN — URSODIOL 300 MG: 300 CAPSULE ORAL at 09:11

## 2018-11-28 RX ADMIN — ATOVAQUONE 1500 MG: 750 SUSPENSION ORAL at 09:11

## 2018-11-28 RX ADMIN — INSULIN ASPART 3 UNITS: 100 INJECTION, SOLUTION INTRAVENOUS; SUBCUTANEOUS at 02:11

## 2018-11-28 RX ADMIN — PREDNISONE 5 MG: 5 TABLET ORAL at 09:11

## 2018-11-28 RX ADMIN — INSULIN ASPART 12 UNITS: 100 INJECTION, SOLUTION INTRAVENOUS; SUBCUTANEOUS at 09:11

## 2018-11-28 RX ADMIN — INSULIN ASPART 2 UNITS: 100 INJECTION, SOLUTION INTRAVENOUS; SUBCUTANEOUS at 09:11

## 2018-11-28 RX ADMIN — INSULIN ASPART 2 UNITS: 100 INJECTION, SOLUTION INTRAVENOUS; SUBCUTANEOUS at 06:11

## 2018-11-28 RX ADMIN — HEPARIN SODIUM 5000 UNITS: 5000 INJECTION, SOLUTION INTRAVENOUS; SUBCUTANEOUS at 02:11

## 2018-11-28 RX ADMIN — ENTECAVIR 0.5 MG: 0.5 TABLET ORAL at 09:11

## 2018-11-28 NOTE — PLAN OF CARE
Problem: Patient Care Overview  Goal: Plan of Care Review  Outcome: Ongoing (interventions implemented as appropriate)    Pt AAOx4 with spouse ( primary caregiver) at the bedside.  Pt  of supportive of pt and active in pt care.   Pt on contact iso for ESBL in urine and in Chevron incision.  No changes overnight.   Pt in bed at 7:30 PM wanting to take medications and go to bed for the night.   Pt given medications a little early per pt request.   PM dose of Midodrine held for SBP > 120.   VS have been stable:  HR slightly tachy at times.  BP 1teens- 130/70's-80's.  Satting % on room air.  Afebrile  HS BG reading 61. Pt asymptomatic. Pt given 4 glucose tablets and pt  gave pt some slices of cheese as well as some bread with peanut butter.   BG rechecked about 30-40 minutes later. BG reading 89. BG checked again a few hours later and had come up to 126.   WV to chevron incision draining white cloudy drainage. Has put out approximately 50 mL over night. WV set to continuous at 125.  Pt with moderate edema to BLE. Knee high OLAF hose in place.   Pt given an extra 60 mg of IV lasix yesterday ,but pt  did not measure urine before emptying.   R FA 20 g saline locked.   Self meds reviewed yesterday day with pt  and . Blue card updated tonight and self med box filled in preparation for pt  to hopefully begin practicing pulling self meds today as pt closer to potential D/C.   Pt has remained free from falls or injury thus far. Bed is in low/ locked position, side rails are up x 2, call light is in reach.   Pt able to ambulate with walker and standby assist. Pt now refusing heparin shots.  Will continue to monitor.

## 2018-11-28 NOTE — ASSESSMENT & PLAN NOTE
- CRRT 10/14, 10/17, 10/20, 10/31.  - CXR 10/30: abundant fluid in b/l pleural space.  - 100 mg total Lasix given 11/27.   - Will give 40 mg Lasix BID today.  - Monitor daily weights.

## 2018-11-28 NOTE — SUBJECTIVE & OBJECTIVE
Scheduled Meds:   aspirin  81 mg Oral Daily    atovaquone  1,500 mg Oral Daily    entecavir  0.5 mg Oral Daily    ertapenem (INVANZ) IVPB  1 g Intravenous Q24H    furosemide  40 mg Oral Daily    heparin (porcine)  5,000 Units Subcutaneous Q8H    insulin aspart U-100  6-12 Units Subcutaneous TIDWM    k phos di & mono-sod phos mono  2 tablet Oral BID    magnesium oxide  800 mg Oral BID    midodrine  15 mg Oral TID    pantoprazole  40 mg Oral BID    predniSONE  5 mg Oral Daily    Followed by    [START ON 12/1/2018] predniSONE  2.5 mg Oral Daily    tacrolimus  1 mg Oral BID    ursodiol  300 mg Oral BID     Continuous Infusions:  PRN Meds:acetaminophen, bisacodyl, calcium carbonate, dextrose 50%, dextrose 50%, glucagon (human recombinant), glucose, glucose, insulin aspart U-100, omnipaque, ondansetron, ondansetron, polyethylene glycol, sodium chloride 0.9%, traMADol    Review of Systems   Constitutional: Positive for activity change and appetite change (improving). Negative for chills, fatigue and fever.   HENT: Negative for congestion, facial swelling and trouble swallowing.    Eyes: Negative for pain, discharge and visual disturbance.   Respiratory: Negative for cough, chest tightness, shortness of breath and wheezing.    Cardiovascular: Positive for leg swelling. Negative for chest pain and palpitations.   Gastrointestinal: Positive for abdominal distention and abdominal pain. Negative for constipation, diarrhea, nausea and vomiting.   Endocrine: Negative.    Genitourinary: Negative for decreased urine volume, difficulty urinating and dysuria.   Musculoskeletal: Positive for back pain. Negative for arthralgias, neck pain and neck stiffness.   Skin: Positive for wound.   Allergic/Immunologic: Positive for immunocompromised state.   Neurological: Positive for weakness. Negative for tremors and headaches.   Psychiatric/Behavioral: Negative for confusion and sleep disturbance.     Objective:     Vital Signs  (Most Recent):  Temp: 99.3 °F (37.4 °C) (11/28/18 0925)  Pulse: (!) 120 (11/28/18 0940)  Resp: 16 (11/28/18 0940)  BP: 131/71 (11/28/18 0940)  SpO2: 96 % (11/28/18 0940) Vital Signs (24h Range):  Temp:  [99 °F (37.2 °C)-99.3 °F (37.4 °C)] 99.3 °F (37.4 °C)  Pulse:  [101-121] 120  Resp:  [14-21] 16  SpO2:  [94 %-100 %] 96 %  BP: (115-134)/(70-77) 131/71     Weight: 60.3 kg (132 lb 15 oz)(standing scale)  Body mass index is 23.55 kg/m².    Intake/Output - Last 3 Shifts       11/26 0700 - 11/27 0659 11/27 0700 - 11/28 0659 11/28 0700 - 11/29 0659    P.O. 1000 360     I.V. (mL/kg) 100 (1.7)      IV Piggyback 200      Total Intake(mL/kg) 1300 (21.7) 360 (6)     Urine (mL/kg/hr)  0 (0)     Other  200     Stool  0     Total Output  200     Net +1300 +160            Urine Occurrence 7 x 6 x     Stool Occurrence 3 x 0 x     Emesis Occurrence 0 x            Physical Exam   Constitutional: She is oriented to person, place, and time. She appears well-developed. No distress.   Temporal and distal extremity muscle wasting   HENT:   Head: Normocephalic and atraumatic.   Eyes: EOM are normal. No scleral icterus.   Neck: Normal range of motion.   Cardiovascular: Normal rate, regular rhythm and normal heart sounds.   No murmur heard.  Pulmonary/Chest: Effort normal. No respiratory distress. She has no wheezes.   Breath sounds decreased at bases   Abdominal: Soft. Bowel sounds are normal. She exhibits distension. There is tenderness. There is no guarding.   Wound vac placed to middle chevron incision + R lateral side, no s/s/i  Erythema + induration RLQ, improving  RLQ VALORIE drain sites c/d/i with sutures   Musculoskeletal: Normal range of motion. She exhibits edema (2+ BLE, + generalized).   Neurological: She is alert and oriented to person, place, and time.   Skin: Skin is warm. She is not diaphoretic.   Psychiatric: She has a normal mood and affect.   Nursing note and vitals reviewed.      Laboratory:  Immunosuppressants         Stop  Route Frequency     tacrolimus capsule 1 mg      -- Oral 2 times daily        CBC:   Recent Labs   Lab 11/28/18  0657   WBC 3.50*   RBC 2.59*   HGB 7.5*   HCT 23.8*   *   MCV 92   MCH 29.0   MCHC 31.5*     CMP:   Recent Labs   Lab 11/28/18  0657   *   CALCIUM 8.3*   ALBUMIN 2.6*   PROT 4.8*      K 3.6   CO2 30*   CL 99   BUN 55*   CREATININE 1.2   ALKPHOS 132   ALT 10   AST 18   BILITOT 1.1*     Labs within the past 24 hours have been reviewed.    Diagnostic Results:  I have personally reviewed all pertinent imaging studies.Scheduled Meds:   aspirin  81 mg Oral Daily    atovaquone  1,500 mg Oral Daily    entecavir  0.5 mg Oral Daily    ertapenem (INVANZ) IVPB  1 g Intravenous Q24H    furosemide  40 mg Oral Daily    heparin (porcine)  5,000 Units Subcutaneous Q8H    insulin aspart U-100  6-12 Units Subcutaneous TIDWM    k phos di & mono-sod phos mono  2 tablet Oral BID    magnesium oxide  800 mg Oral BID    midodrine  15 mg Oral TID    pantoprazole  40 mg Oral BID    predniSONE  5 mg Oral Daily    Followed by    [START ON 12/1/2018] predniSONE  2.5 mg Oral Daily    tacrolimus  1 mg Oral BID    ursodiol  300 mg Oral BID     Continuous Infusions:  PRN Meds:acetaminophen, bisacodyl, calcium carbonate, dextrose 50%, dextrose 50%, glucagon (human recombinant), glucose, glucose, insulin aspart U-100, omnipaque, ondansetron, ondansetron, polyethylene glycol, sodium chloride 0.9%, traMADol    Review of Systems    Objective:     Vital Signs (Most Recent):  Temp: 99.3 °F (37.4 °C) (11/28/18 0925)  Pulse: (!) 120 (11/28/18 0940)  Resp: 16 (11/28/18 0940)  BP: 131/71 (11/28/18 0940)  SpO2: 96 % (11/28/18 0940) Vital Signs (24h Range):  Temp:  [99 °F (37.2 °C)-99.3 °F (37.4 °C)] 99.3 °F (37.4 °C)  Pulse:  [101-121] 120  Resp:  [14-21] 16  SpO2:  [94 %-100 %] 96 %  BP: (115-134)/(70-77) 131/71     Weight: 60.3 kg (132 lb 15 oz)(standing scale)  Body mass index is 23.55 kg/m².    Intake/Output -  Last 3 Shifts       11/26 0700 - 11/27 0659 11/27 0700 - 11/28 0659 11/28 0700 - 11/29 0659    P.O. 1000 360     I.V. (mL/kg) 100 (1.7)      IV Piggyback 200      Total Intake(mL/kg) 1300 (21.7) 360 (6)     Urine (mL/kg/hr)  0 (0)     Other  200     Stool  0     Total Output  200     Net +1300 +160            Urine Occurrence 7 x 6 x     Stool Occurrence 3 x 0 x     Emesis Occurrence 0 x            Physical Exam      Laboratory:  Immunosuppressants         Stop Route Frequency     tacrolimus capsule 1 mg      -- Oral 2 times daily        CBC:   Recent Labs   Lab 11/28/18  0657   WBC 3.50*   RBC 2.59*   HGB 7.5*   HCT 23.8*   *   MCV 92   MCH 29.0   MCHC 31.5*     CMP:   Recent Labs   Lab 11/28/18  0657   *   CALCIUM 8.3*   ALBUMIN 2.6*   PROT 4.8*      K 3.6   CO2 30*   CL 99   BUN 55*   CREATININE 1.2   ALKPHOS 132   ALT 10   AST 18   BILITOT 1.1*     Coagulation: No results for input(s): PT, INR, APTT in the last 168 hours.  Labs within the past 24 hours have been reviewed.    Diagnostic Results:  I have personally reviewed all pertinent imaging studies.

## 2018-11-28 NOTE — ASSESSMENT & PLAN NOTE
- Dietary consulted.   - Temporal and distal extremity muscle wasting and edema.  - Encourage supplements and to increase nutritional intake.  - Per nephrology, pt to be on low protein diet.  - Prealbumin reviewed.   - Tolerating diet.  Denies n/v.    -Told pt it's ok to resume outside protein shakes as renal fx stable, but will monitor closely.  - Will place pt on low fat, high protein diet due to suspicion for chyle leak.  - Pt to be NPO @ midnight for perc drain placement (time TBD).

## 2018-11-28 NOTE — ASSESSMENT & PLAN NOTE
BG goal 140-180    Change to Novolog 6 units with meals if BG <120 (or call endocrine) or 12 units with meals if >120  Low dose correction scale given kidney function.  BG monitoring AC/HS    ADDENDUM:  Change mealtime insulin dose to 7-14.  Administer    7    units subcutaneously with meals if patient eats < 50% or low carb meal; administer    14    units if patient eats >/= to 50% or high carb meal    Start self/caregiver administration of insulin to review for home.     Discharge planning:  TBD

## 2018-11-28 NOTE — PROGRESS NOTES
SW met with pt, pt's spouse, and  (Dunia) in room this morning to f/u for continuity of care.  Pt found seated upright in recliner at bedside.  Pt presented a&ox4 with flat affect.  Pt's spouse entered room later in SW visit and remained standing next to pt throughout visit.  U floor nurse Merary discussing with Dunia the need for pt's  to self-pull medications upon SW's arrival.  Dunia stated plans of reviewing with pt's  and pulling for next couple of doses using blue card.  U nursing staff to f/u and confirm accurate pulling of medications this evening and tomorrow morning.  Pt and spouse deny any coping issues at this time.  Pt reported experiencing pain.  Dunia reports pt is reluctant to take Tylenol for pain in spite of MD and Dunia explaining to pt that Tylenol is different than narcotic pain medication.  SW updated Dunia and pt/spouse on ability to receive DME delivery to pt's room from [facility] at time of discharge.  SW provided Dunia with out-of-pocket costs for rolling walker and bedside commode.  Dunia stated plans of looking at [employer] for cheaper prices if available.  Pt/spouse deny having any psychosocial concerns or questions at this time.  SW remains available for further psychosocial support and will f/u as needed.

## 2018-11-28 NOTE — ASSESSMENT & PLAN NOTE
Managed per primary team  Avoid hypoglycemia    Recent Labs   Lab 11/28/18  0657   ALT 10   AST 18   ALKPHOS 132   BILITOT 1.1*   PROT 4.8*   ALBUMIN 2.6*

## 2018-11-28 NOTE — ASSESSMENT & PLAN NOTE
- Fluctuating H&H.    - Transfusions: 10/14; 10/16; 10/18; 10/19; 10/22; 10/27; 10/29, 10/31, 11/3, 11/21  - FOBT +  - LDH elevated, Hapto < 10.  - EGD 10/18: ulcerated duodenal mucosa.   - Consulted GI about this ongoing issue and further need of another EGD.  - EGD 11/5 unremarkable.   - Iron studies 11/13:  95, TIBC 107, sat iron 89, transferrin 72, ferritin pending  - Hemolytic anemia workup 11/13: Haptoglobin 132, retic 3.9   - Last transfused 1upRBC 11/21. H/H now improved.   - H/H decreased to 7.6/24. Ordered type and screen, but no transfusion planned for today.   - H/H stable from yesterday  - Continue to monitor.

## 2018-11-28 NOTE — ASSESSMENT & PLAN NOTE
- No plans for RRT in near future, per nephrology  - Trialysis line removed 11/13  - Nephrology involved. Appreciate recs.  - Cr improving.  - Continue to monitor.

## 2018-11-28 NOTE — ASSESSMENT & PLAN NOTE
- Valcyte for CMV prophylaxis--> holding (10/31) for leukopenia. Pt will need weekly CMV PCR while discontinued.  - CMV PCR noted to be positive at 129. .  - CMV PCR 11/8: 105  - CMV PCR 11/15 with 89 copies  - CMV PCR 11/23 95 - increased from previous. Continue to hold Valcyte & monitor pt closely.  - Next CMV PCR due 11/29  - Bactrim for PCP prophylaxis ([facility]).--> holding (10/31) for leukopenia.   - Start Atovaquone 11/6.  - Fluconazole for fungal prophylaxis (first dose 10/6). D/c Fluc 11/16 bc pt had been on it for > 30 days post take back.

## 2018-11-28 NOTE — ASSESSMENT & PLAN NOTE
Avoid insulin stacking and hypoglycemia.  Lab Results   Component Value Date    CREATININE 1.2 11/28/2018

## 2018-11-28 NOTE — PLAN OF CARE
Problem: Patient Care Overview  Goal: Plan of Care Review  Outcome: Ongoing (interventions implemented as appropriate)  Pt is AAOx4, VSS, and stand by assist. Pt denies pain. Pt refused heparin. Wound nurse applied wound vac at 125 mmHg continuously. Blood glucose monitoring performed and treated as ordered. VISI monitoring in place. This RN reviewed self med with pt's  ( present)- pt verbalized understanding. Pt's  at bedside. Pt is up in chair, call bell within reach, and nonskid socks on.

## 2018-11-28 NOTE — PROGRESS NOTES
[facility]  Liver Transplant  Progress Note    Patient Name: Scarlett Reddy  MRN: [id number]  Admission Date: 10/1/2018  Hospital Length of Stay: 57 days  Code Status: Full Code  Primary Care Provider: Primary Doctor No  Post-Operative Day: 54    ORGAN:   LIVER  Disease Etiology: Cirrhosis: Type B and D  Donor Type:    - Brain Death  CDC High Risk:   No  Donor CMV Status:   Donor CMV Status: Positive  Donor HBcAB:   Negative  Donor HCV Status:   Negative  Donor HBV SETH: Negative  Donor HCV SETH: Negative  Whole or Partial: Whole Liver  Biliary Anastomosis: End to End  Arterial Anatomy: Replaced Left Hepatic and Right Hepatic  Subjective:     History of Present Illness:  Ms. Reddy is a 67 y/o female with PMH of ESLD secondary Hep B and D.  Listed for liver transplant with MELD 23.  Paracentesis 10/1 with 5L removed, no fluid sent for analysis.  Morning labs significant for hyponatremia, Na 119.  Pt admitted to LTS for sodium monitoring.      Hospital Course: 67 y/o F h/o HBV/delta cirrhosis s/p DBDLT 10/5/18 (CMV D+/R+, steroid induction, MMF/tacro/pred maintenance) with take back on 10/6 2/2 hyperbilirubinema and bilious VALORIE drainage, no leak identified. Post-op course c/b hypercapneic and hypoxic respiratory failure and was reintubated though quickly extubated now doing well. Liver bx (10/6) sig for Zone 3 hemorrhage and collapse, in addition to cholestasis. Transferred from ICU on POD #4. ID consulted to comment on positive diphtheroid culture from ascitic fluid (likely skin colonization) as well as ESBL Klebsiella pneumoniae in urine culture (10/4).  Pt asymptomatic and cell count from ascitic fluid unremarkable. Per ID recs, no need to treat diphtheroids or asymptomatic ESBL Kleb pneumo bacteriuria though pt has had 6 days of therapy which would cover these organisms. Mild peritransplant ascites- repeat US 10/6 with increased arterial resistive indices. Repeat US 10/9 w/ continued  elevation in RIs and fluid collections. LFTs trending down nicely.     Acute gradual worsening of renal function s/p OLTx. Creatinine on arrival 0.8 and has been up trending. Nephrology consulted for post-op EVA. Cr cont to trend up but remains to have good UOP. Had stable response to lasiz diuresis.  BUN elevated requiring multiple rounds of dialysis (10/15, 10/17, 10/20, 10/31). No significant improvements in kidney function noted.   In addition, pt H/H cont to fluctuate requiring multiple blood transfusions (10/14, 10/16, 10/18, 10/19, 10/22, 10/27, 10/29).  Pt reported dark stools 10/15 but color has normalized. FOBT+. EGD 10/18 consistent with ulcerated mucosa in duodenum s/p coagulation. Concern for H. Pylori. Started Amoxicillin/levofloxacin (10/19, complete 11/2)). Remains on Protonix 40 mg BID. Biopsy negative for infection and CMV. Will cont close monitoring H/H, transfuse as needed with goal Hb > 7.0. Of note, Urine cx + Klebsiella pneumoniae (10/13). Received 3 day course of ciprofloxacin, dc'd 10/19.   On 10/19 pm pt c/o crushing chest pain. Troponin 0.094, likely 2/2 ischemic stress. EKG NSR.   Liver US 11/6 showed no arterial flow. Paracentesis 11/7 with 8.9 L removed. Wbc 39, segs 16%. Gram stain no organisms seen + no WBC. Pt feels better since having fluid removed but she continues to have worsening abdominal distention & LE edema.Repeat Liver US 11/8 showed arterial flow with increased RIs. Paracentesis 11/12 with 8.6L removed (WBC 34, segs 25%). 11/15 liver u/s showed abnormally decreased main hepatic artery peak systolic velocity with slow arterial flow and abnormal tardus parvus waveforms intrahepatically. Consulted vascular surgery. Repeat liver US 11/19 showed no arterial flow on R and sluggish flow on L. Hepatic angiogram 11/19 confirmed HAT.  Friday 11/16, purulent drainage noted from middle of chevron incision. Area opened and packed. Cx grew klebsiella p. CT scan showed no signs of  infection. On Monday 11/18, it was noted that the right/lateral side of the chevron had 3 small openings draining purulent fluid. Opened the area and packed. Wound care following.    Interval history:  No acute events overnight. Pt reported interrupted sleep due to increased dose of Lasix yesterday. Induration + erythema RLQ under chevron slightly improved today, but pt continues to c/o back pain. Pt mildly tachycardic. Otherwise, VSS. Decreased Lasix from 100 mg total daily to 80 mg total daily (40 mg BID). Also gave albumin 25% x 1. CT abd/pelvis 11/26 showed large R pleural effusion, moderate L effusion, and no acute changes or concern for infection. Pt c/o slight tremor x 1 day. Likely drug-induced. Will monitor. Wound vac to incision in place. Drainage continues to appear creamy. Suspicious for chyle leak 2/2 color of drainage, high triglycerides in peritoneal fluid 11/12, and persistent ascites despite improvement in renal fx. Will plan for IR to place drain tomorrow and send the fluid for analysis. Pt on day 6 of 7 of Ertapenem for kleb pneumo ESBL UTI. Of note, pt grew same bacteria from wound 11/16.  She is tolerating diet.  Denies diarrhea. Ambulating w/o difficulty. Nurse reviewed meds with pt and  yesterday as pt and her  had not yet pulled self meds. Will follow up on progress. Continue to monitor.    Scheduled Meds:   aspirin  81 mg Oral Daily    atovaquone  1,500 mg Oral Daily    entecavir  0.5 mg Oral Daily    ertapenem (INVANZ) IVPB  1 g Intravenous Q24H    furosemide  40 mg Oral Daily    heparin (porcine)  5,000 Units Subcutaneous Q8H    insulin aspart U-100  6-12 Units Subcutaneous TIDWM    k phos di & mono-sod phos mono  2 tablet Oral BID    magnesium oxide  800 mg Oral BID    midodrine  15 mg Oral TID    pantoprazole  40 mg Oral BID    predniSONE  5 mg Oral Daily    Followed by    [START ON 12/1/2018] predniSONE  2.5 mg Oral Daily    tacrolimus  1 mg Oral BID     ursodiol  300 mg Oral BID     Continuous Infusions:  PRN Meds:acetaminophen, bisacodyl, calcium carbonate, dextrose 50%, dextrose 50%, glucagon (human recombinant), glucose, glucose, insulin aspart U-100, omnipaque, ondansetron, ondansetron, polyethylene glycol, sodium chloride 0.9%, traMADol    Review of Systems   Constitutional: Positive for activity change and appetite change (improving). Negative for chills, fatigue and fever.   HENT: Negative for congestion, facial swelling and trouble swallowing.    Eyes: Negative for pain, discharge and visual disturbance.   Respiratory: Negative for cough, chest tightness, shortness of breath and wheezing.    Cardiovascular: Positive for leg swelling. Negative for chest pain and palpitations.   Gastrointestinal: Positive for abdominal distention and abdominal pain. Negative for constipation, diarrhea, nausea and vomiting.   Endocrine: Negative.    Genitourinary: Negative for decreased urine volume, difficulty urinating and dysuria.   Musculoskeletal: Positive for back pain. Negative for arthralgias, neck pain and neck stiffness.   Skin: Positive for wound.   Allergic/Immunologic: Positive for immunocompromised state.   Neurological: Positive for weakness. Negative for tremors and headaches.   Psychiatric/Behavioral: Negative for confusion and sleep disturbance.     Objective:     Vital Signs (Most Recent):  Temp: 99.3 °F (37.4 °C) (11/28/18 0925)  Pulse: (!) 120 (11/28/18 0940)  Resp: 16 (11/28/18 0940)  BP: 131/71 (11/28/18 0940)  SpO2: 96 % (11/28/18 0940) Vital Signs (24h Range):  Temp:  [99 °F (37.2 °C)-99.3 °F (37.4 °C)] 99.3 °F (37.4 °C)  Pulse:  [101-121] 120  Resp:  [14-21] 16  SpO2:  [94 %-100 %] 96 %  BP: (115-134)/(70-77) 131/71     Weight: 60.3 kg (132 lb 15 oz)(standing scale)  Body mass index is 23.55 kg/m².    Intake/Output - Last 3 Shifts       11/26 0700 - 11/27 0659 11/27 0700 - 11/28 0659 11/28 0700 - 11/29 0659    P.O. 1000 360     I.V. (mL/kg) 100 (1.7)       IV Piggyback 200      Total Intake(mL/kg) 1300 (21.7) 360 (6)     Urine (mL/kg/hr)  0 (0)     Other  200     Stool  0     Total Output  200     Net +1300 +160            Urine Occurrence 7 x 6 x     Stool Occurrence 3 x 0 x     Emesis Occurrence 0 x            Physical Exam   Constitutional: She is oriented to person, place, and time. She appears well-developed. No distress.   Temporal and distal extremity muscle wasting   HENT:   Head: Normocephalic and atraumatic.   Eyes: EOM are normal. No scleral icterus.   Neck: Normal range of motion.   Cardiovascular: Normal rate, regular rhythm and normal heart sounds.   No murmur heard.  Pulmonary/Chest: Effort normal. No respiratory distress. She has no wheezes.   Breath sounds decreased at bases   Abdominal: Soft. Bowel sounds are normal. She exhibits distension. There is tenderness. There is no guarding.   Wound vac placed to middle chevron incision + R lateral side, no s/s/i  Erythema + induration RLQ, improving  RLQ VALORIE drain sites c/d/i with sutures   Musculoskeletal: Normal range of motion. She exhibits edema (2+ BLE, + generalized).   Neurological: She is alert and oriented to person, place, and time.   Skin: Skin is warm. She is not diaphoretic.   Psychiatric: She has a normal mood and affect.   Nursing note and vitals reviewed.      Laboratory:  Immunosuppressants         Stop Route Frequency     tacrolimus capsule 1 mg      -- Oral 2 times daily        CBC:   Recent Labs   Lab 11/28/18  0657   WBC 3.50*   RBC 2.59*   HGB 7.5*   HCT 23.8*   *   MCV 92   MCH 29.0   MCHC 31.5*     CMP:   Recent Labs   Lab 11/28/18  0657   *   CALCIUM 8.3*   ALBUMIN 2.6*   PROT 4.8*      K 3.6   CO2 30*   CL 99   BUN 55*   CREATININE 1.2   ALKPHOS 132   ALT 10   AST 18   BILITOT 1.1*     Labs within the past 24 hours have been reviewed.    Diagnostic Results:  I have personally reviewed all pertinent imaging studies.Scheduled Meds:   aspirin  81 mg Oral Daily     atovaquone  1,500 mg Oral Daily    entecavir  0.5 mg Oral Daily    ertapenem (INVANZ) IVPB  1 g Intravenous Q24H    furosemide  40 mg Oral Daily    heparin (porcine)  5,000 Units Subcutaneous Q8H    insulin aspart U-100  6-12 Units Subcutaneous TIDWM    k phos di & mono-sod phos mono  2 tablet Oral BID    magnesium oxide  800 mg Oral BID    midodrine  15 mg Oral TID    pantoprazole  40 mg Oral BID    predniSONE  5 mg Oral Daily    Followed by    [START ON 12/1/2018] predniSONE  2.5 mg Oral Daily    tacrolimus  1 mg Oral BID    ursodiol  300 mg Oral BID     Continuous Infusions:  PRN Meds:acetaminophen, bisacodyl, calcium carbonate, dextrose 50%, dextrose 50%, glucagon (human recombinant), glucose, glucose, insulin aspart U-100, omnipaque, ondansetron, ondansetron, polyethylene glycol, sodium chloride 0.9%, traMADol    Review of Systems    Objective:     Vital Signs (Most Recent):  Temp: 99.3 °F (37.4 °C) (11/28/18 0925)  Pulse: (!) 120 (11/28/18 0940)  Resp: 16 (11/28/18 0940)  BP: 131/71 (11/28/18 0940)  SpO2: 96 % (11/28/18 0940) Vital Signs (24h Range):  Temp:  [99 °F (37.2 °C)-99.3 °F (37.4 °C)] 99.3 °F (37.4 °C)  Pulse:  [101-121] 120  Resp:  [14-21] 16  SpO2:  [94 %-100 %] 96 %  BP: (115-134)/(70-77) 131/71     Weight: 60.3 kg (132 lb 15 oz)(standing scale)  Body mass index is 23.55 kg/m².    Intake/Output - Last 3 Shifts       11/26 0700 - 11/27 0659 11/27 0700 - 11/28 0659 11/28 0700 - 11/29 0659    P.O. 1000 360     I.V. (mL/kg) 100 (1.7)      IV Piggyback 200      Total Intake(mL/kg) 1300 (21.7) 360 (6)     Urine (mL/kg/hr)  0 (0)     Other  200     Stool  0     Total Output  200     Net +1300 +160            Urine Occurrence 7 x 6 x     Stool Occurrence 3 x 0 x     Emesis Occurrence 0 x            Physical Exam      Laboratory:  Immunosuppressants         Stop Route Frequency     tacrolimus capsule 1 mg      -- Oral 2 times daily        CBC:   Recent Labs   Lab 11/28/18  0657   WBC 3.50*  "  RBC 2.59*   HGB 7.5*   HCT 23.8*   *   MCV 92   MCH 29.0   MCHC 31.5*     CMP:   Recent Labs   Lab 11/28/18  0657   *   CALCIUM 8.3*   ALBUMIN 2.6*   PROT 4.8*      K 3.6   CO2 30*   CL 99   BUN 55*   CREATININE 1.2   ALKPHOS 132   ALT 10   AST 18   BILITOT 1.1*     Coagulation: No results for input(s): PT, INR, APTT in the last 168 hours.  Labs within the past 24 hours have been reviewed.    Diagnostic Results:  I have personally reviewed all pertinent imaging studies.    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   - POD 1 US: increased arterial resistive indices, suggestive of edema vs acute rejection vs congestion.  - Repeat US 10/9 with continued elevation of RIs.  - LFTs stable.  - T bili trending down.   - Repeat liver US (10/11): elevated RIs.  - Liver US 11/6 to assess for ascites. No arterial flow within the liver allograft concerning for interval thrombosis/occlusion of the arterial system. Severe ascites seen.  - Liver US 11/8: arterial flow seen w/ elevated RIs   - LFTs remain stable. No CTA at this time due to EVA and normal liver function.   - Liver US 11/15 showed "abnormally decreased main hepatic artery peak systolic velocity with slow arterial flow and abnormal tardus parvus waveforms intrahepatically."   -Vascular surgery consulted  - Liver US 11/19 showed no arterial flow on the right and sluggish flow on the left.   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 No further intervention planned at this time as patient's LFTs are stable. Will monitor.  - CT abd/pelvis 11/26 showed: 1. Persistent large volume of ascites. 2. Large right and moderate left pleural effusions with adjacent basilar atelectasis. 3. Anasarca         Pleural effusion    - Common post liver transplant  - CT 11/26 showed: " "Large right and moderate left pleural effusions with adjacent basilar atelectasis.  - Pt sp02 stable on RA, but c/o some orthopnea.  - Continue w/ diuresis.       Chronic kidney disease, stage III (moderate)    - Kidney fx improving  - Monitor       Hypomagnesemia    - Continue PO Mag Oxide  - Continue to monitor.       Hypokalemia    - Replace as needed.   - Continue to monitor.     Hypophosphatemia    - Continue k phos and monitor closely.       Delayed surgical wound healing    - Middle chevron incision opened 11/16 with purulent drainage  - Packed with Aquacel ag at this time. Growing Klebsiella p.   - CT abd/pelvis 11/17- no signs of infection  - 11/17: R side of chevron incision w/ 3 small holes draining purulent fluid, opened and packed w/ aquacel.   - Wound care following.  - Wound w/ increased drainage, so wound care placed vac 11/24/18.  Apprec recs. Extending Ertapenem thru 11/28 per ID.    - Abdomen erythematous and indurated RLQ inferior to chevron.  - CT abd/pelvis 11/26 - no sign of infection    .       Hepatic artery thrombosis of transplanted liver    - Liver US 11/19 showed no arterial flow to R and sluggish flow to L  - Hepatic angiogram 11/19 confirmed HAT  - No further intervention planned at this time. Will continue to monitor.     Other drug-induced pancytopenia    - Decreased plt, wbc, h/h  - See "leukopenia"  - Continue to monitor.     Thrombocytopenia, unspecified    - Improving.  - Monitor.       Alteration in skin integrity    - Monitor.       Leukopenia    - Stopped Cellcept, Bactrim, and Valcyte 10/31   - Cellcept half dose resumed 11/6  - Started Atovaquone 11/6.   - CMV PCR noted to be positive at 129. Will hold off on treatment with valcyte at this time. Monitoring weekly CMV PCRs.  - Cellcept held 11/23 2/2 infection (UTI + wound)  - Continue to monitor.       Azotemia    - CRRT 10/31 for uremic toxin clearance.           UTI (urinary tract infection)    - Urine Cx 10/15 w/ " "Klebsiella pneumonia.  - Started Ciprofloxacin (10/17-10/19).   - Started on amoxicillin/levo for H. Pylori (completed abx 11/2).  - Pt c/o lower back pain 11/22. UA w/ trace leuks, moderate wbs, 1+ blood, cx pending  - Pt symptomatic (back pain) & with ESBL in wound previously.  Ertapenem 1g QD x 3 days (started 11/23), per ID recs.  Given pt cont to have back pain and concern for infection in wound ( thick drainage and increased drainage)-  extended Ertapenem for 4 more days per ID (stop date 11/28/18).         Acute blood loss anemia    - Expected post transplant  - H&H cont to fluctuate.   - FOBT +.  - EGD 10/18 - showed diffuse bleeding with ulcerated duodenal mucosa.  - EGD 11/5 - improvement seen, no active bleeding.  - Resume heparin sq injections 11/6.   - Decrease heparin sq from TID to BID 11/15   - Heparin dc'd 11/17  - See "anemia requiring transfusions."  - Continue to monitor.             Post LT - Acute renal failure    - Creatinine on arrival 0.8 and trended up.  - Nephrology consulted.  - Renal US (10/11): no hydronephrosis.  - Echo 10/12: diastolic dysfunction, likely 2/2 to ARF.  - CRRT 10/14; SLED 10/17, 10/20. --> no improvements noted in kidney fxn.  - 24 hr urine creatine collection: CrCl of 7.   - CRRT 10/31 with 1L removed.   - See "hypervolemia associated with renal insufficiency"     Hypervolemia associated with renal insufficiency    - CRRT 10/14, 10/17, 10/20, 10/31.  - CXR 10/30: abundant fluid in b/l pleural space.  - 100 mg total Lasix given 11/27.   - Will give 40 mg Lasix BID today.  - Monitor daily weights.       EVA (acute kidney injury)    - No plans for RRT in near future, per nephrology  - Trialysis line removed 11/13  - Nephrology involved. Appreciate recs.  - Cr improving.  - Continue to monitor.     At risk for opportunistic infections    - Valcyte for CMV prophylaxis--> holding (10/31) for leukopenia. Pt will need weekly CMV PCR while discontinued.  - CMV PCR noted to be " positive at 129. .  - CMV PCR 11/8: 105  - CMV PCR 11/15 with 89 copies  - CMV PCR 11/23 95 - increased from previous. Continue to hold Valcyte & monitor pt closely.  - Next CMV PCR due 11/29  - Bactrim for PCP prophylaxis (MWF).--> holding (10/31) for leukopenia.   - Start Atovaquone 11/6.  - Fluconazole for fungal prophylaxis (first dose 10/6). D/c Fluc 11/16 bc pt had been on it for > 30 days post take back.       Long-term use of immunosuppressant medication    - Prograf: stopped 10/29. Resumed 11/2.  --> Will monitor for signs of toxic side effects, check daily troughs, and change meds accordingly.   - Prednisone: stopped 10/29. Started on Hydrocortisone. pred taper resumed.  - Cellcept: stopped 10/31 for leukopenia. Restarted Cellcept 500 mg bid, 11/6.  - Cellcept held 11/23 2/2 infection         Prophylactic immunotherapy    - See long term use immunosuppresion.       Ascites    - Paracentesis 10/1, 5L removed, no fluid sent for analysis.  - Pt remains volume overloaded.   - Liver US 11/6 with severe ascites.   - Paracentesis 11/7 with 8.9 L removed. Wbc 39, segs 16%. Gram stain - no organisms seen and no WBC. Cultures NGTD.  - Paracentesis 11/12: 8.6 L removed. 34 wbc, 25% segs, 33 lymphs, 42 monocytes/macrophages. Cultures NGTD  - Paracentesis 11/21 with 4 L removed, neg for infection.     Weakness    - See physical deconditioning.       Physical deconditioning    - PT/OT consulted.  Progressing well w therapy.  - Recommend home health PT and rolling walker at discharge (orders placed 11/6).       Hypotension    - d/c'd midodrine as patient has not required it recently  - Continue to monitor.     Anemia requiring transfusions    - Fluctuating H&H.    - Transfusions: 10/14; 10/16; 10/18; 10/19; 10/22; 10/27; 10/29, 10/31, 11/3, 11/21  - FOBT +  - LDH elevated, Hapto < 10.  - EGD 10/18: ulcerated duodenal mucosa.   - Consulted GI about this ongoing issue and further need of another EGD.  - EGD 11/5  unremarkable.   - Iron studies 11/13:  95, TIBC 107, sat iron 89, transferrin 72, ferritin pending  - Hemolytic anemia workup 11/13: Haptoglobin 132, retic 3.9   - Last transfused 1upRBC 11/21. H/H now improved.   - H/H decreased to 7.6/24. Ordered type and screen, but no transfusion planned for today.   - H/H stable from yesterday  - Continue to monitor.           Severe malnutrition    - Dietary consulted.   - Temporal and distal extremity muscle wasting and edema.  - Encourage supplements and to increase nutritional intake.  - Per nephrology, pt to be on low protein diet.  - Prealbumin reviewed.   - Tolerating diet.  Denies n/v.    -Told pt it's ok to resume outside protein shakes as renal fx stable, but will monitor closely.  - Will place pt on low fat, high protein diet due to suspicion for chyle leak.  - Pt to be NPO @ midnight for perc drain placement (time TBD).        Type 2 diabetes mellitus with diabetic polyneuropathy, with long-term current use of insulin    - Continue home regimen.  - Endocrine consulted.  - Pt off insulin drip at this time.  Apprec endo recs.        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  - Tx w/ Entecavir- dose changed to daily 11/26 due to improved renal fx             VTE Risk Mitigation (From admission, onward)        Ordered     heparin (porcine) injection 5,000 Units  Every 8 hours      11/22/18 1014     IP VTE HIGH RISK PATIENT  Once      11/05/18 1145     Place sequential compression device  Until discontinued      10/05/18 0709          The patients clinical status was discussed at multidisplinary rounds, involving transplant surgery, transplant medicine, pharmacy, nursing, nutrition, and social work    Discharge Planning:  No Patient Care Coordination Note on file.      Elizabeth Dean PA-C  Liver Transplant  [facility]

## 2018-11-28 NOTE — PROGRESS NOTES
"[facility]  Endocrinology  Progress Note    Admit Date: 10/1/2018     Reason for Consult: Management of type 2 DM, Hyperglycemia      Surgical Procedure and Date: Liver transplant 10/5/18, Ex lap 10/6/18     Diabetes diagnosis year:      Home Diabetes Medications:  Lantus 18 units HS and novolog 17 units with meals plus correction scale (150-200 +1)        Lab Results   Component Value Date     HGBA1C 6.8 (H) 2018        How often checking glucose at home? 3-4   BG readings on regimen: 120-150.  Post prandial readings as high as upper 200s  Hypoglycemia on the regimen? yes, none recently   Missed doses on regimen?  No     Diabetes Complications include:     Diabetic peripheral neuropathy      Complicating diabetes co morbidities:   CIRRHOSIS        HPI:  Patient is a 68 y.o. female with a diagnosis of ESLD, listed for liver transplant with meld 23 who underwent live transplant on 10/5/18.  Back to OR on 10/6/18 for ex lap.  Admitted 10/1/18 for hyponatremia s/p paracentesis.  Personal has known diagnosis of diabetes, endocrine consulted for diabetes management.    Post-operative course complicated by ESBL Klebsiella pneumoniae in urine (in contact isolation), peritransplant ascites, worsening renal function (required temporary dialysis), anemia (multiple blood transfusions), and possible GI bleed.     Interval HPI:   Overnight events: Remains in TSU, hypoglcemic event yesterday evening.  On IV antibiotics.  Prednisone 5 mg daily, standard steroid taper.  BG above goal this am (203).  Eatin%  Nausea: No  Hypoglycemia and intervention: Yes - 61, oral glucose tabs given  Fever: No  TPN and/or TF: No    /73   Pulse 104   Temp 99 °F (37.2 °C) (Oral)   Resp 14   Ht 5' 3" (1.6 m)   Wt 60.3 kg (132 lb 15 oz) Comment: standing scale  LMP  (LMP Unknown)   SpO2 98%   Breastfeeding? No   BMI 23.55 kg/m²      Labs Reviewed and Include    Recent Labs   Lab 18  0657   GLU " 203*   CALCIUM 8.3*   ALBUMIN 2.6*   PROT 4.8*      K 3.6   CO2 30*   CL 99   BUN 55*   CREATININE 1.2   ALKPHOS 132   ALT 10   AST 18   BILITOT 1.1*     Lab Results   Component Value Date    WBC 3.50 (L) 11/28/2018    HGB 7.5 (L) 11/28/2018    HCT 23.8 (L) 11/28/2018    MCV 92 11/28/2018     (L) 11/28/2018     No results for input(s): TSH, FREET4 in the last 168 hours.  Lab Results   Component Value Date    HGBA1C 6.0 (H) 10/04/2018       Nutritional status:   Body mass index is 23.55 kg/m².  Lab Results   Component Value Date    ALBUMIN 2.6 (L) 11/28/2018    ALBUMIN 2.6 (L) 11/27/2018    ALBUMIN 2.9 (L) 11/26/2018     Lab Results   Component Value Date    PREALBUMIN 15 (L) 11/03/2018    PREALBUMIN 12 (L) 09/17/2018       Estimated Creatinine Clearance: 36.6 mL/min (based on SCr of 1.2 mg/dL).    Accu-Checks  Recent Labs     11/26/18  0750 11/26/18  1138 11/26/18  1625 11/26/18  2156 11/27/18  0831 11/27/18  1212 11/27/18  1719 11/27/18  2142 11/27/18  2226 11/28/18  0027   POCTGLUCOSE 204* 223* 193* 122* 245* 324* 149* 61* 89 126*       Current Medications and/or Treatments Impacting Glycemic Control  Immunotherapy:    Immunosuppressants         Stop Route Frequency     tacrolimus capsule 1 mg      -- Oral 2 times daily        Steroids:   Hormones (From admission, onward)    Start     Stop Route Frequency Ordered    12/01/18 0900  predniSONE tablet 2.5 mg      12/08 0859 Oral Daily 11/05/18 1004    11/24/18 0900  predniSONE tablet 5 mg      12/01 0859 Oral Daily 11/05/18 1004    10/17/18 0945  predniSONE tablet 15 mg      10/24 0859 Oral Daily 10/17/18 0943        Pressors:    Autonomic Drugs (From admission, onward)    Start     Stop Route Frequency Ordered    10/10/18 1500  midodrine tablet 15 mg      -- Oral 3 times daily 10/10/18 1122    10/06/18 1617  rocuronium (ZEMURON) 10 mg/mL injection     Comments:  Created by cabinet override    10/07 0429   10/06/18 1613        Hyperglycemia/Diabetes  Medications:   Antihyperglycemics (From admission, onward)    Start     Stop Route Frequency Ordered    11/25/18 1130  insulin aspart U-100 pen 7-14 Units      -- SubQ 3 times daily with meals 11/25/18 0930    10/14/18 0925  insulin aspart U-100 pen 0-5 Units      -- SubQ Before meals & nightly PRN 10/14/18 0825    10/07/18 1200  insulin regular (Humulin R) 100 Units in sodium chloride 0.9% 100 mL infusion      10/13 2100 IV Continuous 10/07/18 1055          ASSESSMENT and PLAN    * Liver transplanted    Managed per primary team  Avoid hypoglycemia    Recent Labs   Lab 11/28/18  0657   ALT 10   AST 18   ALKPHOS 132   BILITOT 1.1*   PROT 4.8*   ALBUMIN 2.6*            Type 2 diabetes mellitus with diabetic polyneuropathy, with long-term current use of insulin    BG goal 140-180    Change to Novolog 6 units with meals if BG <120 (or call endocrine) or 12 units with meals if >120  Low dose correction scale given kidney function.  BG monitoring AC/HS    ADDENDUM:  Change mealtime insulin dose to 7-14.  Administer    7    units subcutaneously with meals if patient eats < 50% or low carb meal; administer    14    units if patient eats >/= to 50% or high carb meal    Start self/caregiver administration of insulin to review for home.     Discharge planning:  TBD     Severe malnutrition    Oral intake encouraged, may affect BG readings     UTI (urinary tract infection)    Infection may increase insulin resistance.          Prophylactic immunotherapy    May increase insulin resistance.        Alteration in skin integrity    Infection may increase insulin resistance.   IV antibiotics   Optimize BG control.        EVA (acute kidney injury)    Avoid insulin stacking and hypoglycemia.  Lab Results   Component Value Date    CREATININE 1.2 11/28/2018            Greater than 30 minutes spent at bedside directly counseling patient on diet, hospital protocol regarding insulin administration, and effect BG management strategies.  All  discussion was facilitated by bedside certified , [place].    Be Willams NP  Endocrinology  [facility]

## 2018-11-28 NOTE — SUBJECTIVE & OBJECTIVE
"Interval HPI:   Overnight events: Remains in TSU, hypoglcemic event yesterday evening.  On IV antibiotics.  Prednisone 5 mg daily, standard steroid taper.  BG above goal this am (203).  Eatin%  Nausea: No  Hypoglycemia and intervention: Yes - 61, oral glucose tabs given  Fever: No  TPN and/or TF: No    /73   Pulse 104   Temp 99 °F (37.2 °C) (Oral)   Resp 14   Ht 5' 3" (1.6 m)   Wt 60.3 kg (132 lb 15 oz) Comment: standing scale  LMP  (LMP Unknown)   SpO2 98%   Breastfeeding? No   BMI 23.55 kg/m²     Labs Reviewed and Include    Recent Labs   Lab 18  0657   *   CALCIUM 8.3*   ALBUMIN 2.6*   PROT 4.8*      K 3.6   CO2 30*   CL 99   BUN 55*   CREATININE 1.2   ALKPHOS 132   ALT 10   AST 18   BILITOT 1.1*     Lab Results   Component Value Date    WBC 3.50 (L) 2018    HGB 7.5 (L) 2018    HCT 23.8 (L) 2018    MCV 92 2018     (L) 2018     No results for input(s): TSH, FREET4 in the last 168 hours.  Lab Results   Component Value Date    HGBA1C 6.0 (H) 10/04/2018       Nutritional status:   Body mass index is 23.55 kg/m².  Lab Results   Component Value Date    ALBUMIN 2.6 (L) 2018    ALBUMIN 2.6 (L) 2018    ALBUMIN 2.9 (L) 2018     Lab Results   Component Value Date    PREALBUMIN 15 (L) 2018    PREALBUMIN 12 (L) 2018       Estimated Creatinine Clearance: 36.6 mL/min (based on SCr of 1.2 mg/dL).    Accu-Checks  Recent Labs     18  0750 18  1138 18  1625 18  2156 18  0831 18  1212 18  1719 18  2142 18  2226 18  0027   POCTGLUCOSE 204* 223* 193* 122* 245* 324* 149* 61* 89 126*       Current Medications and/or Treatments Impacting Glycemic Control  Immunotherapy:    Immunosuppressants         Stop Route Frequency     tacrolimus capsule 1 mg      -- Oral 2 times daily        Steroids:   Hormones (From admission, onward)    Start     Stop Route Frequency Ordered    " 12/01/18 0900  predniSONE tablet 2.5 mg      12/08 0859 Oral Daily 11/05/18 1004    11/24/18 0900  predniSONE tablet 5 mg      12/01 0859 Oral Daily 11/05/18 1004    10/17/18 0945  predniSONE tablet 15 mg      10/24 0859 Oral Daily 10/17/18 0943        Pressors:    Autonomic Drugs (From admission, onward)    Start     Stop Route Frequency Ordered    10/10/18 1500  midodrine tablet 15 mg      -- Oral 3 times daily 10/10/18 1122    10/06/18 1617  rocuronium (ZEMURON) 10 mg/mL injection     Comments:  Created by cabinet override    10/07 0429   10/06/18 1617        Hyperglycemia/Diabetes Medications:   Antihyperglycemics (From admission, onward)    Start     Stop Route Frequency Ordered    11/25/18 1130  insulin aspart U-100 pen 7-14 Units      -- SubQ 3 times daily with meals 11/25/18 0930    10/14/18 0925  insulin aspart U-100 pen 0-5 Units      -- SubQ Before meals & nightly PRN 10/14/18 0825    10/07/18 1200  insulin regular (Humulin R) 100 Units in sodium chloride 0.9% 100 mL infusion      10/13 2100 IV Continuous 10/07/18 1055

## 2018-11-29 PROBLEM — R00.0 TACHYCARDIA: Status: ACTIVE | Noted: 2018-11-29

## 2018-11-29 LAB
ALBUMIN FLD-MCNC: 1.5 G/DL
ALBUMIN SERPL BCP-MCNC: 2.9 G/DL
ALP SERPL-CCNC: 123 U/L
ALT SERPL W/O P-5'-P-CCNC: 10 U/L
AMYLASE, BODY FLUID: 53 U/L
ANION GAP SERPL CALC-SCNC: 11 MMOL/L
APPEARANCE FLD: NORMAL
AST SERPL-CCNC: 16 U/L
BASOPHILS # BLD AUTO: 0.02 K/UL
BASOPHILS NFR BLD: 0.7 %
BILIRUB SERPL-MCNC: 1.1 MG/DL
BLD PROD TYP BPU: NORMAL
BLOOD UNIT EXPIRATION DATE: NORMAL
BLOOD UNIT TYPE CODE: 6200
BLOOD UNIT TYPE: NORMAL
BODY FLD TYPE: NORMAL
BODY FLUID SOURCE AMYLASE: NORMAL
BODY FLUID SOURCE, LDH: NORMAL
BUN SERPL-MCNC: 58 MG/DL
CALCIUM SERPL-MCNC: 8.4 MG/DL
CHLORIDE SERPL-SCNC: 98 MMOL/L
CO2 SERPL-SCNC: 28 MMOL/L
CODING SYSTEM: NORMAL
COLOR FLD: NORMAL
CREAT SERPL-MCNC: 1.2 MG/DL
DIFFERENTIAL METHOD: ABNORMAL
DISPENSE STATUS: NORMAL
EOSINOPHIL # BLD AUTO: 0 K/UL
EOSINOPHIL NFR BLD: 1.4 %
ERYTHROCYTE [DISTWIDTH] IN BLOOD BY AUTOMATED COUNT: 16.2 %
EST. GFR  (AFRICAN AMERICAN): 53.3 ML/MIN/1.73 M^2
EST. GFR  (NON AFRICAN AMERICAN): 46.2 ML/MIN/1.73 M^2
GLUCOSE SERPL-MCNC: 266 MG/DL
GRAM STN SPEC: NORMAL
GRAM STN SPEC: NORMAL
HCT VFR BLD AUTO: 22.6 %
HGB BLD-MCNC: 7 G/DL
IMM GRANULOCYTES # BLD AUTO: 0.09 K/UL
IMM GRANULOCYTES NFR BLD AUTO: 3.1 %
LDH FLD L TO P-CCNC: 102 U/L
LYMPHOCYTES # BLD AUTO: 0.3 K/UL
LYMPHOCYTES NFR BLD: 8.5 %
LYMPHOCYTES NFR FLD MANUAL: 51 %
MAGNESIUM SERPL-MCNC: 1.3 MG/DL
MCH RBC QN AUTO: 28.1 PG
MCHC RBC AUTO-ENTMCNC: 31 G/DL
MCV RBC AUTO: 91 FL
MONOCYTES # BLD AUTO: 0.3 K/UL
MONOCYTES NFR BLD: 9.9 %
MONOS+MACROS NFR FLD MANUAL: 31 %
NEUTROPHILS # BLD AUTO: 2.3 K/UL
NEUTROPHILS NFR BLD: 76.4 %
NEUTROPHILS NFR FLD MANUAL: 18 %
NRBC BLD-RTO: 0 /100 WBC
PHOSPHATE SERPL-MCNC: 2.4 MG/DL
PLATELET # BLD AUTO: 108 K/UL
PMV BLD AUTO: 10.3 FL
POCT GLUCOSE: 249 MG/DL (ref 70–110)
POCT GLUCOSE: 291 MG/DL (ref 70–110)
POCT GLUCOSE: 299 MG/DL (ref 70–110)
POTASSIUM SERPL-SCNC: 3.3 MMOL/L
PROT FLD-MCNC: 2.4 G/DL
PROT SERPL-MCNC: 4.9 G/DL
RBC # BLD AUTO: 2.49 M/UL
SODIUM SERPL-SCNC: 137 MMOL/L
SPECIMEN SOURCE: NORMAL
SPECIMEN SOURCE: NORMAL
TACROLIMUS BLD-MCNC: 4.2 NG/ML
TRANS ERYTHROCYTES VOL PATIENT: NORMAL ML
WBC # BLD AUTO: 2.94 K/UL
WBC # FLD: 175 /CU MM

## 2018-11-29 PROCEDURE — 83615 LACTATE (LD) (LDH) ENZYME: CPT

## 2018-11-29 PROCEDURE — 99900035 HC TECH TIME PER 15 MIN (STAT)

## 2018-11-29 PROCEDURE — 25000003 PHARM REV CODE 250: Performed by: NURSE PRACTITIONER

## 2018-11-29 PROCEDURE — 36430 TRANSFUSION BLD/BLD COMPNT: CPT

## 2018-11-29 PROCEDURE — P9021 RED BLOOD CELLS UNIT: HCPCS

## 2018-11-29 PROCEDURE — 82042 OTHER SOURCE ALBUMIN QUAN EA: CPT

## 2018-11-29 PROCEDURE — 82150 ASSAY OF AMYLASE: CPT

## 2018-11-29 PROCEDURE — 63600175 PHARM REV CODE 636 W HCPCS: Performed by: RADIOLOGY

## 2018-11-29 PROCEDURE — 84157 ASSAY OF PROTEIN OTHER: CPT

## 2018-11-29 PROCEDURE — 63600175 PHARM REV CODE 636 W HCPCS: Performed by: NURSE PRACTITIONER

## 2018-11-29 PROCEDURE — 25000003 PHARM REV CODE 250: Performed by: PHYSICIAN ASSISTANT

## 2018-11-29 PROCEDURE — 89051 BODY FLUID CELL COUNT: CPT

## 2018-11-29 PROCEDURE — S5571 INSULIN DISPOS PEN 3 ML: HCPCS | Performed by: NURSE PRACTITIONER

## 2018-11-29 PROCEDURE — 63600175 PHARM REV CODE 636 W HCPCS: Performed by: PHYSICIAN ASSISTANT

## 2018-11-29 PROCEDURE — 87077 CULTURE AEROBIC IDENTIFY: CPT

## 2018-11-29 PROCEDURE — 94761 N-INVAS EAR/PLS OXIMETRY MLT: CPT

## 2018-11-29 PROCEDURE — 87186 SC STD MICRODIL/AGAR DIL: CPT

## 2018-11-29 PROCEDURE — 97530 THERAPEUTIC ACTIVITIES: CPT

## 2018-11-29 PROCEDURE — 80197 ASSAY OF TACROLIMUS: CPT

## 2018-11-29 PROCEDURE — 20600001 HC STEP DOWN PRIVATE ROOM

## 2018-11-29 PROCEDURE — 25000003 PHARM REV CODE 250: Performed by: TRANSPLANT SURGERY

## 2018-11-29 PROCEDURE — 87070 CULTURE OTHR SPECIMN AEROBIC: CPT

## 2018-11-29 PROCEDURE — 85025 COMPLETE CBC W/AUTO DIFF WBC: CPT

## 2018-11-29 PROCEDURE — 94664 DEMO&/EVAL PT USE INHALER: CPT

## 2018-11-29 PROCEDURE — 87205 SMEAR GRAM STAIN: CPT

## 2018-11-29 PROCEDURE — 99233 SBSQ HOSP IP/OBS HIGH 50: CPT | Mod: 24,,, | Performed by: PHYSICIAN ASSISTANT

## 2018-11-29 PROCEDURE — 36415 COLL VENOUS BLD VENIPUNCTURE: CPT

## 2018-11-29 PROCEDURE — 97116 GAIT TRAINING THERAPY: CPT

## 2018-11-29 PROCEDURE — 80053 COMPREHEN METABOLIC PANEL: CPT

## 2018-11-29 PROCEDURE — 84100 ASSAY OF PHOSPHORUS: CPT

## 2018-11-29 PROCEDURE — 87102 FUNGUS ISOLATION CULTURE: CPT

## 2018-11-29 PROCEDURE — 84478 ASSAY OF TRIGLYCERIDES: CPT

## 2018-11-29 PROCEDURE — 82945 GLUCOSE OTHER FLUID: CPT

## 2018-11-29 PROCEDURE — 83735 ASSAY OF MAGNESIUM: CPT

## 2018-11-29 RX ORDER — HYDROCODONE BITARTRATE AND ACETAMINOPHEN 500; 5 MG/1; MG/1
TABLET ORAL
Status: DISCONTINUED | OUTPATIENT
Start: 2018-11-29 | End: 2018-12-13

## 2018-11-29 RX ORDER — MIDAZOLAM HYDROCHLORIDE 1 MG/ML
INJECTION INTRAMUSCULAR; INTRAVENOUS CODE/TRAUMA/SEDATION MEDICATION
Status: COMPLETED | OUTPATIENT
Start: 2018-11-29 | End: 2018-11-29

## 2018-11-29 RX ORDER — MAGNESIUM SULFATE HEPTAHYDRATE 40 MG/ML
2 INJECTION, SOLUTION INTRAVENOUS ONCE
Status: COMPLETED | OUTPATIENT
Start: 2018-11-29 | End: 2018-11-29

## 2018-11-29 RX ORDER — FENTANYL CITRATE 50 UG/ML
INJECTION, SOLUTION INTRAMUSCULAR; INTRAVENOUS CODE/TRAUMA/SEDATION MEDICATION
Status: COMPLETED | OUTPATIENT
Start: 2018-11-29 | End: 2018-11-29

## 2018-11-29 RX ORDER — METOPROLOL TARTRATE 25 MG/1
12.5 TABLET ORAL 2 TIMES DAILY
Status: DISCONTINUED | OUTPATIENT
Start: 2018-11-29 | End: 2018-11-30

## 2018-11-29 RX ADMIN — INSULIN ASPART 3 UNITS: 100 INJECTION, SOLUTION INTRAVENOUS; SUBCUTANEOUS at 08:11

## 2018-11-29 RX ADMIN — INSULIN ASPART 2 UNITS: 100 INJECTION, SOLUTION INTRAVENOUS; SUBCUTANEOUS at 05:11

## 2018-11-29 RX ADMIN — FENTANYL CITRATE 50 MCG: 50 INJECTION, SOLUTION INTRAMUSCULAR; INTRAVENOUS at 12:11

## 2018-11-29 RX ADMIN — ERTAPENEM SODIUM 1 G: 1 INJECTION, POWDER, LYOPHILIZED, FOR SOLUTION INTRAMUSCULAR; INTRAVENOUS at 02:11

## 2018-11-29 RX ADMIN — MAGNESIUM SULFATE IN WATER 2 G: 40 INJECTION, SOLUTION INTRAVENOUS at 09:11

## 2018-11-29 RX ADMIN — ASPIRIN 81 MG CHEWABLE TABLET 81 MG: 81 TABLET CHEWABLE at 08:11

## 2018-11-29 RX ADMIN — INSULIN ASPART 2 UNITS: 100 INJECTION, SOLUTION INTRAVENOUS; SUBCUTANEOUS at 10:11

## 2018-11-29 RX ADMIN — INSULIN ASPART 3 UNITS: 100 INJECTION, SOLUTION INTRAVENOUS; SUBCUTANEOUS at 02:11

## 2018-11-29 RX ADMIN — INSULIN DETEMIR 6 UNITS: 100 INJECTION, SOLUTION SUBCUTANEOUS at 02:11

## 2018-11-29 RX ADMIN — TACROLIMUS 1 MG: 1 CAPSULE ORAL at 05:11

## 2018-11-29 RX ADMIN — ACETAMINOPHEN 650 MG: 325 TABLET, FILM COATED ORAL at 10:11

## 2018-11-29 RX ADMIN — FUROSEMIDE 40 MG: 40 TABLET ORAL at 08:11

## 2018-11-29 RX ADMIN — MAGNESIUM OXIDE TAB 400 MG (241.3 MG ELEMENTAL MG) 800 MG: 400 (241.3 MG) TAB at 08:11

## 2018-11-29 RX ADMIN — PANTOPRAZOLE SODIUM 40 MG: 40 TABLET, DELAYED RELEASE ORAL at 08:11

## 2018-11-29 RX ADMIN — POTASSIUM BICARBONATE 50 MEQ: 25 TABLET, EFFERVESCENT ORAL at 09:11

## 2018-11-29 RX ADMIN — FUROSEMIDE 40 MG: 40 TABLET ORAL at 05:11

## 2018-11-29 RX ADMIN — TACROLIMUS 1 MG: 1 CAPSULE ORAL at 08:11

## 2018-11-29 RX ADMIN — MIDAZOLAM HYDROCHLORIDE 2 MG: 1 INJECTION, SOLUTION INTRAMUSCULAR; INTRAVENOUS at 12:11

## 2018-11-29 RX ADMIN — ATOVAQUONE 1500 MG: 750 SUSPENSION ORAL at 08:11

## 2018-11-29 RX ADMIN — URSODIOL 300 MG: 300 CAPSULE ORAL at 08:11

## 2018-11-29 RX ADMIN — METOPROLOL TARTRATE 12.5 MG: 25 TABLET, FILM COATED ORAL at 06:11

## 2018-11-29 RX ADMIN — ENTECAVIR 0.5 MG: 0.5 TABLET ORAL at 08:11

## 2018-11-29 RX ADMIN — PREDNISONE 5 MG: 5 TABLET ORAL at 08:11

## 2018-11-29 RX ADMIN — DIBASIC SODIUM PHOSPHATE, MONOBASIC POTASSIUM PHOSPHATE AND MONOBASIC SODIUM PHOSPHATE 2 TABLET: 852; 155; 130 TABLET ORAL at 08:11

## 2018-11-29 RX ADMIN — INSULIN ASPART 7 UNITS: 100 INJECTION, SOLUTION INTRAVENOUS; SUBCUTANEOUS at 02:11

## 2018-11-29 RX ADMIN — HEPARIN SODIUM 5000 UNITS: 5000 INJECTION, SOLUTION INTRAVENOUS; SUBCUTANEOUS at 02:11

## 2018-11-29 NOTE — PROGRESS NOTES
Pt  came out to nurses stating. States that his wife has a bad headache ( occipital ) and cannot sleep. Systolic 's-130s. HR 1-teens.  I informed pt  that usually we give Tylenol for headaches. Pt  states that they have tried Tylenol and it does not work.  Pt  asking that I call MD to let them know and see what they recommend.  MD on call for transplant contacted and MD recommended pt taking Tylenol and see if that helps alleviate the pain so that pt can fall asleep.     Informed pt and  of MD recommendation to take the Tylenol. They again stated that Tylenol does nothing.   I then asked if pt had tried taking Tramadol that she also has a PRN order for. Pt states that she is too scared to take.  Went on to offer trying an ice pack for pt head, but pt refused that as well.

## 2018-11-29 NOTE — ASSESSMENT & PLAN NOTE
- Dietary consulted.   - Temporal and distal extremity muscle wasting and edema.  - Encourage supplements and to increase nutritional intake.  - Per nephrology, pt to be on low protein diet.  - Prealbumin reviewed.   - Tolerating diet.  Denies n/v.    -Told pt it's ok to resume outside protein shakes as renal fx stable, but will monitor closely.  - Will place pt on low fat, high protein diet due to suspicion for chyle leak.

## 2018-11-29 NOTE — PLAN OF CARE
Problem: Patient Care Overview  Goal: Plan of Care Review  Outcome: Ongoing (interventions implemented as appropriate)    Pt is AAOx4 with spouse at the bedside.   Contact iso for ESBL in urine and abdominal wound maintained.  BP stable. Scheduled Midodrine discontinued yesterday. HR low 100's at rest. Up to 120's- 130's when ambulating. MD made aware.   Pt up with walker and stand by assist. Pt c/o lower back pain and occipital HA but refusing tylenol or tramadol. Heat packs applied to lower back. Pt states that she is uncomfortable lying in bed d/t abdominal fluid so moved to chair to try to get comfortable.  Wound vac at 125 mmHg continuous putting out milky drainage. W[place] has put out approximately 250 mL over night.  Plan for pt to go to IR today for abscess drainage with drain placement and to assess for chyle leak. Pt has been NPO since midnight.  Blood glucose monitoring performed and treated as ordered. Pt has voided 1250 in urine. 1 BM. Pt's 8PM self meds pulled 100% by pt , but when asked pt to pull meds for today pt  says that he does not need to pull tomorrows self meds because he has already done it and already knows how to do it.   Pt remained free from falls or injury thus far. Bed is in low/ locked position, side rails are up x 2, call light is in reach.   Will continue to monitor.

## 2018-11-29 NOTE — H&P
Inpatient Radiology Pre-procedure Note    History of Present Illness:  Scarlett Reddy is a 68 y.o. female who presents for abnormal perihepatic fluid collection drainage.  Admission H&P reviewed.  Past Medical History:   Diagnosis Date    Anemia requiring transfusions 8/16/2018    Ascites     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 8/15/2018    Cirrhosis     Cough 8/30/2018    Esophageal varices     Esophageal varices without bleeding 8/15/2018    Hepatic encephalopathy     Hepatitis B     Hepatitis D virus infection     Hypersplenism     Hyponatremia     Hypotension     Liver transplant candidate     Neutropenia     Portal hypertension     Severe malnutrition 8/16/2018    Type 2 diabetes mellitus, with long-term current use of insulin 8/15/2018     Past Surgical History:   Procedure Laterality Date    ANGIOGRAM-HEPATIC Left 11/19/2018    Performed by ORAL Edgar III, MD at [facility] OR [address] [place] [address]    ANGIOGRAPHY OF LIVER Left 11/19/2018    Procedure: ANGIOGRAM-HEPATIC;  Surgeon: ORAL Edgar III, MD;  Location: [facility] OR [address] [place] [address];  Service: Peripheral Vascular;  Laterality: Left;  13.7 min  436.10 mGy  24ml Dye  3ml Local    EGD (ESOPHAGOGASTRODUODENOSCOPY) N/A 11/5/2018    Performed by Johann Mejia MD at [facility] (2ND FLR)    EGD (ESOPHAGOGASTRODUODENOSCOPY) N/A 10/18/2018    Performed by Chung Robins MD at [facility] (2ND FLR)    EGD (ESOPHAGOGASTRODUODENOSCOPY) N/A 8/17/2018    Performed by Travon Delgadillo MD at [facility] (2ND FLR)    ESOPHAGOGASTRODUODENOSCOPY N/A 8/17/2018    Procedure: EGD (ESOPHAGOGASTRODUODENOSCOPY);  Surgeon: Travon Delgadillo MD;  Location: [address]);  Service: Endoscopy;  Laterality: N/A;    ESOPHAGOGASTRODUODENOSCOPY N/A 10/18/2018    Procedure: EGD (ESOPHAGOGASTRODUODENOSCOPY);  Surgeon: Chung Robins MD;  Location: [address]);  Service: Endoscopy;  Laterality: N/A;    ESOPHAGOGASTRODUODENOSCOPY N/A 11/5/2018    Procedure: EGD  (ESOPHAGOGASTRODUODENOSCOPY);  Surgeon: Johann Mejia MD;  Location: [facility] (2ND FLR);  Service: Endoscopy;  Laterality: N/A;    EXPLORATORY LAPAROTOMY AFTER LIVER TRANSPLANTATION N/A 10/6/2018    Procedure: LAPAROTOMY, EXPLORATORY, AFTER LIVER TRANSPLANT;  Surgeon: Benson Matson MD;  [facility];  Service: Transplant;  Laterality: N/A;    LAPAROTOMY, EXPLORATORY, AFTER LIVER TRANSPLANT N/A 10/6/2018    Performed by Benson Matson MD at [facility] OR [facility] F[facility]    LIVER TRANSPLANT N/A 10/4/2018    Procedure: TRANSPLANT, LIVER;  Surgeon: Yony Burns MD;  [facility];  Service: Transplant;  Laterality: N/A;    TRANSPLANT, LIVER N/A 10/4/2018    Performed by Yony Burns MD at [facility] OR [address] [place] [address]       Review of Systems:   As documented in primary team H&P    Home Meds:   Prior to Admission medications    Medication Sig Start Date End Date Taking? Authorizing Provider   insulin aspart U-100 (NOVOLOG FLEXPEN U-100 INSULIN) 100 unit/mL InPn pen Inject 20 units into the skin with breakfast,17 units with lunch, and 17 with dinner Plus correction scale, max TDD 60 units daily 9/27/18  Yes Seble Tineo NP   insulin glargine (LANTUS) 100 unit/mL injection Inject 20 Units into the skin every evening. 9/27/18  Yes Seble Tineo NP   midodrine (PROAMATINE) 5 MG Tab Take 3 tablets (15 mg total) by mouth every 8 (eight) hours. 8/24/18 2/20/19 Yes Gigi Sprague MD   OMEGA-3 FATTY ACIDS-EPA ORAL Take by mouth.   Yes Historical Provider, MD   rifAXIMin (XIFAXAN) 550 mg Tab Take 1 tablet (550 mg total) by mouth 2 (two) times daily. 8/27/18  Yes Rita Rubin NP   sodium bicarbonate 650 MG tablet Take 2 tablets (1,300 mg total) by mouth 3 (three) times daily. 9/21/18  Yes Gigi Sprague MD   blood sugar diagnostic (ACCU-CHEK DC) Strp 1 strip by Misc.(Non-Drug; Combo Route) route 3 (three) times daily.    Historical Provider, MD   entecavir (BARACLUDE) 0.5 MG Tab Take 1 tablet  "(0.5 mg total) by mouth once daily. 10/5/18   Benson Matson MD   ergocalciferol (VITAMIN D2) 50,000 unit Cap Take 1 capsule (50,000 Units total) by mouth every 7 days. 10/5/18   Benson Matson MD   famotidine (PEPCID) 20 MG tablet Take 1 tablet (20 mg total) by mouth once daily. 10/5/18   Benson Matson MD   mycophenolate (CELLCEPT) 250 mg Cap Take 4 capsules (1,000 mg total) by mouth 2 (two) times daily. 10/5/18   Benson Matson MD   pen needle, diabetic (BD ULTRA-FINE CITLALI PEN NEEDLE) 32 gauge x 5/32" Ndle To use to inject insulin 4x daily 9/14/18   Rita Rubin, KEEGAN   predniSONE (DELTASONE) 10 MG tablet Take by mouth daily: 20mg 10/10-10/16, 15mg 10/17-10/23, 10mg 10/24-10/30, 5mg 10/31-11/6, 2.5mg 11/7-11/13, Stop 11/14 10/5/18   Benson Matson MD   sulfamethoxazole-trimethoprim 400-80mg (BACTRIM,SEPTRA) 400-80 mg per tablet Take 1 tablet by mouth once daily. STOP 4/3/19 10/5/18 4/3/19  Benson Matson MD   tacrolimus (PROGRAF) 1 MG Cap Take 6 capsules (6 mg total) by mouth every 12 (twelve) hours. 10/5/18   Benson Matson MD   valGANciclovir (VALCYTE) 450 mg Tab Take 1 tablet (450 mg total) by mouth once daily. STOP 1/3/19 10/5/18 1/3/19  Benson Matson MD     Scheduled Meds:    aspirin  81 mg Oral Daily    atovaquone  1,500 mg Oral Daily    entecavir  0.5 mg Oral Daily    ertapenem (INVANZ) IVPB  1 g Intravenous Once    furosemide  40 mg Oral BID    heparin (porcine)  5,000 Units Subcutaneous Q8H    insulin aspart U-100  7-14 Units Subcutaneous TIDWM    k phos di & mono-sod phos mono  2 tablet Oral BID    magnesium oxide  800 mg Oral BID    magnesium sulfate IVPB  2 g Intravenous Once    pantoprazole  40 mg Oral BID    predniSONE  5 mg Oral Daily    Followed by    [START ON 12/1/2018] predniSONE  2.5 mg Oral Daily    tacrolimus  1 mg Oral BID    ursodiol  300 mg Oral BID     Continuous Infusions:   PRN Meds:sodium chloride, " acetaminophen, bisacodyl, calcium carbonate, dextrose 50%, dextrose 50%, glucagon (human recombinant), glucose, glucose, insulin aspart U-100, omnipaque, ondansetron, ondansetron, polyethylene glycol, sodium chloride 0.9%, traMADol  Anticoagulants/Antiplatelets: aspirin and Heparin, aspirin last dose today and heparin held this AM    Allergies: Review of patient's allergies indicates:  No Known Allergies  Sedation Hx: have not been any systemic reactions    Labs:  No results for input(s): INR in the last 168 hours.    Invalid input(s):  PT,  PTT    Recent Labs   Lab 11/29/18 0734   WBC 2.94*   HGB 7.0*   HCT 22.6*   MCV 91   *      Recent Labs   Lab 11/29/18 0734   *      K 3.3*   CL 98   CO2 28   BUN 58*   CREATININE 1.2   CALCIUM 8.4*   MG 1.3*   ALT 10   AST 16   ALBUMIN 2.9*   BILITOT 1.1*         Vitals:  Temp: 98.3 °F (36.8 °C) (11/29/18 0846)  Pulse: (!) 118 (11/29/18 0846)  Resp: 19 (11/29/18 0846)  BP: 123/67 (11/29/18 0846)  SpO2: 99 % (11/29/18 0846)     Physical Exam:  ASA: III  Mallampati: III    General: no acute distress  Mental Status: alert and oriented to person, place and time  HEENT: normocephalic, atraumatic  Chest: unlabored breathing  Heart: regular heart rate  Abdomen: nondistended  Extremity: moves all extremities    Plan: abnormal perihepatic fluid collection drainage  Sedation Plan: Clau Shaikh M.D.  PGY-2  Radiology

## 2018-11-29 NOTE — PROGRESS NOTES
[facility]  Liver Transplant  Progress Note    Patient Name: Scarlett Reddy  MRN: [id number]  Admission Date: 10/1/2018  Hospital Length of Stay: 58 days  Code Status: Full Code  Primary Care Provider: Primary Doctor No  Post-Operative Day: 55    ORGAN:   LIVER  Disease Etiology: Cirrhosis: Type B and D  Donor Type:    - Brain Death  CDC High Risk:   No  Donor CMV Status:   Donor CMV Status: Positive  Donor HBcAB:   Negative  Donor HCV Status:   Negative  Donor HBV SEHT: Negative  Donor HCV SETH: Negative  Whole or Partial: Whole Liver  Biliary Anastomosis: End to End  Arterial Anatomy: Replaced Left Hepatic and Right Hepatic  Subjective:     History of Present Illness:  Ms. Reddy is a 67 y/o female with PMH of ESLD secondary Hep B and D.  Listed for liver transplant with MELD 23.  Paracentesis 10/1 with 5L removed, no fluid sent for analysis.  Morning labs significant for hyponatremia, Na 119.  Pt admitted to LTS for sodium monitoring.      Hospital Course: 67 y/o F h/o HBV/delta cirrhosis s/p DBDLT 10/5/18 (CMV D+/R+, steroid induction, MMF/tacro/pred maintenance) with take back on 10/6 2/2 hyperbilirubinema and bilious VALORIE drainage, no leak identified. Post-op course c/b hypercapneic and hypoxic respiratory failure and was reintubated though quickly extubated now doing well. Liver bx (10/6) sig for Zone 3 hemorrhage and collapse, in addition to cholestasis. Transferred from ICU on POD #4. ID consulted to comment on positive diphtheroid culture from ascitic fluid (likely skin colonization) as well as ESBL Klebsiella pneumoniae in urine culture (10/4).  Pt asymptomatic and cell count from ascitic fluid unremarkable. Per ID recs, no need to treat diphtheroids or asymptomatic ESBL Kleb pneumo bacteriuria though pt has had 6 days of therapy which would cover these organisms. Mild peritransplant ascites- repeat US 10/6 with increased arterial resistive indices. Repeat US 10/9 w/ continued  elevation in RIs and fluid collections. LFTs trending down nicely.     Acute gradual worsening of renal function s/p OLTx. Creatinine on arrival 0.8 and has been up trending. Nephrology consulted for post-op EVA. Cr cont to trend up but remains to have good UOP. Had stable response to lasiz diuresis.  BUN elevated requiring multiple rounds of dialysis (10/15, 10/17, 10/20, 10/31). No significant improvements in kidney function noted.   In addition, pt H/H cont to fluctuate requiring multiple blood transfusions (10/14, 10/16, 10/18, 10/19, 10/22, 10/27, 10/29).  Pt reported dark stools 10/15 but color has normalized. FOBT+. EGD 10/18 consistent with ulcerated mucosa in duodenum s/p coagulation. Concern for H. Pylori. Started Amoxicillin/levofloxacin (10/19, complete 11/2)). Remains on Protonix 40 mg BID. Biopsy negative for infection and CMV. Will cont close monitoring H/H, transfuse as needed with goal Hb > 7.0. Of note, Urine cx + Klebsiella pneumoniae (10/13). Received 3 day course of ciprofloxacin, dc'd 10/19.   On 10/19 pm pt c/o crushing chest pain. Troponin 0.094, likely 2/2 ischemic stress. EKG NSR.   Liver US 11/6 showed no arterial flow. Paracentesis 11/7 with 8.9 L removed. Wbc 39, segs 16%. Gram stain no organisms seen + no WBC. Pt feels better since having fluid removed but she continues to have worsening abdominal distention & LE edema.Repeat Liver US 11/8 showed arterial flow with increased RIs. Paracentesis 11/12 with 8.6L removed (WBC 34, segs 25%). 11/15 liver u/s showed abnormally decreased main hepatic artery peak systolic velocity with slow arterial flow and abnormal tardus parvus waveforms intrahepatically. Consulted vascular surgery. Repeat liver US 11/19 showed no arterial flow on R and sluggish flow on L. Hepatic angiogram 11/19 confirmed HAT.  Friday 11/16, purulent drainage noted from middle of chevron incision. Area opened and packed. Cx grew klebsiella p. CT scan showed no signs of  infection. On Monday 11/18, it was noted that the right/lateral side of the chevron had 3 small openings draining purulent fluid. Opened the area and packed. Vac placed on 11/24. Wound care following.    Interval history:  No acute events overnight. Pt again reported poor sleep due to pain. She states she took one Tylenol and did not want to take any additional pain meditation. She reports the pain is worst in her right lower back when lying down and worst in her right lower abdomen when sitting up. Induration + erythema RLQ persistent, but improved from earlier this week. Pt tachycardic, but improved since yesterday. Will start Metoprolol 12.5 mg qd for rate control and monitor closely. LE edema worse today. Continue Lasix 40 mg BID. H/H 7/22.6. Transfused 1uPRBC. CT abd/pelvis 11/26 showed large R pleural effusion, moderate L effusion, and no acute changes or concern for infection. Wound vac to incision in place. Drainage continues to appear creamy. Suspicious for chyle leak 2/2 color of drainage, high triglycerides in peritoneal fluid 11/12, and persistent ascites despite improvement in renal fx. IR placed drain today. Patient tolerated well. Will f/u fluid cx and monitor drain output. Pt on day 7 of 7 of Ertapenem for kleb pneumo ESBL UTI. Of note, pt grew same bacteria from wound 11/16.  She is tolerating diet.  Denies diarrhea. Ambulating w/o difficulty. Patient's  pulled self-meds last night. Will continue to encourage pulling self-meds.    Scheduled Meds:   aspirin  81 mg Oral Daily    atovaquone  1,500 mg Oral Daily    entecavir  0.5 mg Oral Daily    furosemide  40 mg Oral BID    heparin (porcine)  5,000 Units Subcutaneous Q8H    insulin aspart U-100  7-14 Units Subcutaneous TIDWM    insulin detemir U-100  6 Units Subcutaneous Daily    k phos di & mono-sod phos mono  2 tablet Oral BID    magnesium oxide  800 mg Oral BID    metoprolol tartrate  12.5 mg Oral BID    pantoprazole  40 mg Oral  BID    predniSONE  5 mg Oral Daily    Followed by    [START ON 12/1/2018] predniSONE  2.5 mg Oral Daily    tacrolimus  1 mg Oral BID     Continuous Infusions:  PRN Meds:sodium chloride, acetaminophen, bisacodyl, calcium carbonate, dextrose 50%, dextrose 50%, glucagon (human recombinant), glucose, glucose, insulin aspart U-100, omnipaque, ondansetron, ondansetron, polyethylene glycol, sodium chloride 0.9%, traMADol    Review of Systems   Constitutional: Positive for activity change and appetite change (improving). Negative for chills, fatigue and fever.   HENT: Negative for congestion, facial swelling and trouble swallowing.    Eyes: Negative for pain, discharge and visual disturbance.   Respiratory: Negative for cough, chest tightness, shortness of breath and wheezing.    Cardiovascular: Positive for leg swelling. Negative for chest pain and palpitations.   Gastrointestinal: Positive for abdominal distention and abdominal pain. Negative for constipation, diarrhea, nausea and vomiting.   Endocrine: Negative.    Genitourinary: Negative for decreased urine volume, difficulty urinating and dysuria.   Musculoskeletal: Positive for back pain and neck pain. Negative for arthralgias and neck stiffness.   Skin: Positive for wound.   Allergic/Immunologic: Positive for immunocompromised state.   Neurological: Positive for weakness. Negative for tremors and headaches.   Psychiatric/Behavioral: Negative for confusion and sleep disturbance.     Objective:     Vital Signs (Most Recent):  Temp: 98.8 °F (37.1 °C) (11/29/18 1510)  Pulse: (!) 116 (11/29/18 1510)  Resp: 18 (11/29/18 1510)  BP: 135/75 (11/29/18 1510)  SpO2: 97 % (11/29/18 1510) Vital Signs (24h Range):  Temp:  [98.3 °F (36.8 °C)-99.6 °F (37.6 °C)] 98.8 °F (37.1 °C)  Pulse:  [100-134] 116  Resp:  [13-25] 18  SpO2:  [95 %-100 %] 97 %  BP: (113-151)/(58-82) 135/75     Weight: 60.3 kg (132 lb 15 oz)(standing scale)  Body mass index is 23.55 kg/m².    Intake/Output - Last  3 Shifts       11/27 0700 - 11/28 0659 11/28 0700 - 11/29 0659 11/29 0700 - 11/30 0659    P.O. 360 2010     I.V. (mL/kg)  100 (1.7)     IV Piggyback 100 100     Total Intake(mL/kg) 460 (7.6) 2210 (36.7)     Urine (mL/kg/hr) 0 (0) 1950 (1.3)     Other 200 150     Stool 0 0     Total Output 200 2100     Net +260 +110            Urine Occurrence 6 x 4 x     Stool Occurrence 0 x 5 x           Physical Exam   Constitutional: She is oriented to person, place, and time. She appears well-developed. No distress.   Temporal and distal extremity muscle wasting   HENT:   Head: Normocephalic and atraumatic.   Eyes: EOM are normal. No scleral icterus.   Neck: Normal range of motion.   Cardiovascular: Normal rate, regular rhythm and normal heart sounds.   No murmur heard.  Pulmonary/Chest: Effort normal. No respiratory distress. She has no wheezes.   Breath sounds decreased at bases   Abdominal: Soft. Bowel sounds are normal. She exhibits distension. There is tenderness. There is no guarding.   Wound vac placed to middle chevron incision + R lateral side, no s/s/i  Erythema + induration RLQ, improving  RLQ VALORIE drain sites c/d/i with sutures   Musculoskeletal: Normal range of motion. She exhibits edema (3+ BLE, + generalized).   Neurological: She is alert and oriented to person, place, and time.   Skin: Skin is warm. She is not diaphoretic.   Psychiatric: She has a normal mood and affect.   Nursing note and vitals reviewed.      Laboratory:  Immunosuppressants         Stop Route Frequency     tacrolimus capsule 1 mg      -- Oral 2 times daily        CBC:   Recent Labs   Lab 11/29/18  0734   WBC 2.94*   RBC 2.49*   HGB 7.0*   HCT 22.6*   *   MCV 91   MCH 28.1   MCHC 31.0*     CMP:   Recent Labs   Lab 11/29/18  0734   *   CALCIUM 8.4*   ALBUMIN 2.9*   PROT 4.9*      K 3.3*   CO2 28   CL 98   BUN 58*   CREATININE 1.2   ALKPHOS 123   ALT 10   AST 16   BILITOT 1.1*     Labs within the past 24 hours have been  reviewed.    Diagnostic Results:  I have personally reviewed all pertinent imaging studies.Scheduled Meds:   aspirin  81 mg Oral Daily    atovaquone  1,500 mg Oral Daily    entecavir  0.5 mg Oral Daily    furosemide  40 mg Oral BID    heparin (porcine)  5,000 Units Subcutaneous Q8H    insulin aspart U-100  7-14 Units Subcutaneous TIDWM    insulin detemir U-100  6 Units Subcutaneous Daily    k phos di & mono-sod phos mono  2 tablet Oral BID    magnesium oxide  800 mg Oral BID    metoprolol tartrate  12.5 mg Oral BID    pantoprazole  40 mg Oral BID    predniSONE  5 mg Oral Daily    Followed by    [START ON 12/1/2018] predniSONE  2.5 mg Oral Daily    tacrolimus  1 mg Oral BID     Continuous Infusions:  PRN Meds:sodium chloride, acetaminophen, bisacodyl, calcium carbonate, dextrose 50%, dextrose 50%, glucagon (human recombinant), glucose, glucose, insulin aspart U-100, omnipaque, ondansetron, ondansetron, polyethylene glycol, sodium chloride 0.9%, traMADol    Review of Systems    Objective:     Vital Signs (Most Recent):  Temp: 98.8 °F (37.1 °C) (11/29/18 1510)  Pulse: (!) 116 (11/29/18 1510)  Resp: 18 (11/29/18 1510)  BP: 135/75 (11/29/18 1510)  SpO2: 97 % (11/29/18 1510) Vital Signs (24h Range):  Temp:  [98.3 °F (36.8 °C)-99.6 °F (37.6 °C)] 98.8 °F (37.1 °C)  Pulse:  [100-134] 116  Resp:  [13-25] 18  SpO2:  [95 %-100 %] 97 %  BP: (113-151)/(58-82) 135/75     Weight: 60.3 kg (132 lb 15 oz)(standing scale)  Body mass index is 23.55 kg/m².    Intake/Output - Last 3 Shifts       11/27 0700 - 11/28 0659 11/28 0700 - 11/29 0659 11/29 0700 - 11/30 0659    P.O. 360 2010     I.V. (mL/kg)  100 (1.7)     IV Piggyback 100 100     Total Intake(mL/kg) 460 (7.6) 2210 (36.7)     Urine (mL/kg/hr) 0 (0) 1950 (1.3)     Other 200 150     Stool 0 0     Total Output 200 2100     Net +260 +110            Urine Occurrence 6 x 4 x     Stool Occurrence 0 x 5 x           Physical Exam      Laboratory:  Immunosuppressants          "Stop Route Frequency     tacrolimus capsule 1 mg      -- Oral 2 times daily        CBC:   Recent Labs   Lab 11/29/18  0734   WBC 2.94*   RBC 2.49*   HGB 7.0*   HCT 22.6*   *   MCV 91   MCH 28.1   MCHC 31.0*     CMP:   Recent Labs   Lab 11/29/18  0734   *   CALCIUM 8.4*   ALBUMIN 2.9*   PROT 4.9*      K 3.3*   CO2 28   CL 98   BUN 58*   CREATININE 1.2   ALKPHOS 123   ALT 10   AST 16   BILITOT 1.1*     Coagulation: No results for input(s): PT, INR, APTT in the last 168 hours.  Labs within the past 24 hours have been reviewed.    Diagnostic Results:  I have personally reviewed all pertinent imaging studies.    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   - POD 1 US: increased arterial resistive indices, suggestive of edema vs acute rejection vs congestion.  - Repeat US 10/9 with continued elevation of RIs.  - LFTs stable.  - T bili trending down.   - Repeat liver US (10/11): elevated RIs.  - Liver US 11/6 to assess for ascites. No arterial flow within the liver allograft concerning for interval thrombosis/occlusion of the arterial system. Severe ascites seen.  - Liver US 11/8: arterial flow seen w/ elevated RIs   - LFTs remain stable. No CTA at this time due to EVA and normal liver function.   - Liver US 11/15 showed "abnormally decreased main hepatic artery peak systolic velocity with slow arterial flow and abnormal tardus parvus waveforms intrahepatically."   -Vascular surgery consulted  - Liver US 11/19 showed no arterial flow on the right and sluggish flow on the left.   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 No further intervention planned at this time as patient's LFTs are stable. Will monitor.  - CT abd/pelvis 11/26 showed: 1. Persistent large volume of ascites. 2. Large right and moderate left pleural " "effusions with adjacent basilar atelectasis. 3. Anasarca         Tachycardia    - Pt w/ tachycardia 2-3 days, improved today, but still present.  - Start Metoprolol 12.5 mg qd for rate control.  - Monitor closely.       Pleural effusion    - Common post liver transplant  - CT 11/26 showed: Large right and moderate left pleural effusions with adjacent basilar atelectasis.  - Pt sp02 stable on RA, but c/o some orthopnea.  - Continue w/ diuresis.       Chronic kidney disease, stage III (moderate)    - Kidney fx improving  - Monitor       Hypomagnesemia    - Continue PO Mag Oxide  - IV Mag 2g 11/29 for Mag 1.3  - Continue to monitor.       Hypokalemia    - Replace as needed.   - Continue to monitor.     Hypophosphatemia    - Continue k phos and monitor closely.       Delayed surgical wound healing    - Middle chevron incision opened 11/16 with purulent drainage  - Packed with Aquacel ag at this time. Growing Klebsiella p.   - CT abd/pelvis 11/17- no signs of infection  - 11/17: R side of chevron incision w/ 3 small holes draining purulent fluid, opened and packed w/ aquacel.   - Wound care following.  - Wound w/ increased drainage, so wound care placed vac 11/24/18.  Apprec recs. Extending Ertapenem thru 11/28 per ID.    - Abdomen erythematous and indurated RLQ inferior to chevron.  - CT abd/pelvis 11/26 - no sign of infection  - Next wound vac change 11/30.    .       Hepatic artery thrombosis of transplanted liver    - Liver US 11/19 showed no arterial flow to R and sluggish flow to L  - Hepatic angiogram 11/19 confirmed HAT  - No further intervention planned at this time. Will continue to monitor.     Drug-induced pancytopenia    - Decreased plt, wbc, h/h  - See "leukopenia"  - Continue to monitor.     Thrombocytopenia, unspecified    - Improving.  - Monitor.       Alteration in skin integrity    - Monitor.       Leukopenia    - Stopped Cellcept, Bactrim, and Valcyte 10/31   - Cellcept half dose resumed 11/6  - " "Started Atovaquone 11/6.   - CMV PCR noted to be positive at 129. Will hold off on treatment with valcyte at this time. Monitoring weekly CMV PCRs.  - Cellcept held 11/23 2/2 infection (UTI + wound)  - Continue to monitor.       Azotemia    - CRRT 10/31 for uremic toxin clearance.           UTI (urinary tract infection)    - Urine Cx 10/15 w/ Klebsiella pneumonia.  - Started Ciprofloxacin (10/17-10/19).   - Started on amoxicillin/levo for H. Pylori (completed abx 11/2).  - Pt c/o lower back pain 11/22. UA w/ trace leuks, moderate wbs, 1+ blood, cx pending  - Pt symptomatic (back pain) & with ESBL in wound previously.  Ertapenem 1g QD x 3 days (started 11/23), per ID recs.  Given pt cont to have back pain and concern for infection in wound ( thick drainage and increased drainage)-  extended Ertapenem for 4 more days per ID (stop date 11/29/18).         Acute blood loss anemia    - Expected post transplant  - H&H cont to fluctuate.   - FOBT +.  - EGD 10/18 - showed diffuse bleeding with ulcerated duodenal mucosa.  - EGD 11/5 - improvement seen, no active bleeding.  - Resume heparin sq injections 11/6.   - Decrease heparin sq from TID to BID 11/15   - Heparin dc'd 11/17  - See "anemia requiring transfusions."  - Continue to monitor.             Post LT - Acute renal failure    - Creatinine on arrival 0.8 and trended up.  - Nephrology consulted.  - Renal US (10/11): no hydronephrosis.  - Echo 10/12: diastolic dysfunction, likely 2/2 to ARF.  - CRRT 10/14; SLED 10/17, 10/20. --> no improvements noted in kidney fxn.  - 24 hr urine creatine collection: CrCl of 7.   - CRRT 10/31 with 1L removed.   - See "hypervolemia associated with renal insufficiency"     Hypervolemia associated with renal insufficiency    - CRRT 10/14, 10/17, 10/20, 10/31.  - CXR 10/30: abundant fluid in b/l pleural space.  - 100 mg total Lasix given 11/27.   - Continue 40 mg Lasix BID.  - Monitor daily weights.       EVA (acute kidney injury)    - No " plans for RRT in near future, per nephrology  - Trialysis line removed 11/13  - Nephrology involved. Appreciate recs.  - Cr improving.  - Continue to monitor.     At risk for opportunistic infections    - Valcyte for CMV prophylaxis--> holding (10/31) for leukopenia. Pt will need weekly CMV PCR while discontinued.  - CMV PCR noted to be positive at 129. .  - CMV PCR 11/8: 105  - CMV PCR 11/15 with 89 copies  - CMV PCR 11/23 95 - increased from previous. Continue to hold Valcyte & monitor pt closely.  - CMV PCR 11/29 pending  - Bactrim for PCP prophylaxis ([facility]).--> holding (10/31) for leukopenia.   - Start Atovaquone 11/6.  - Fluconazole for fungal prophylaxis (first dose 10/6). D/c Fluc 11/16 bc pt had been on it for > 30 days post take back.       Long-term use of immunosuppressant medication    - Prograf: stopped 10/29. Resumed 11/2.  --> Will monitor for signs of toxic side effects, check daily troughs, and change meds accordingly.   - Prednisone: stopped 10/29. Started on Hydrocortisone. pred taper resumed.  - Cellcept: stopped 10/31 for leukopenia. Restarted Cellcept 500 mg bid, 11/6.  - Cellcept held 11/23 2/2 infection         Prophylactic immunotherapy    - See long term use immunosuppresion.       Ascites    - Paracentesis 10/1, 5L removed, no fluid sent for analysis.  - Pt remains volume overloaded.   - Liver US 11/6 with severe ascites.   - Paracentesis 11/7 with 8.9 L removed. Wbc 39, segs 16%. Gram stain - no organisms seen and no WBC. Cultures NGTD.  - Paracentesis 11/12: 8.6 L removed. 34 wbc, 25% segs, 33 lymphs, 42 monocytes/macrophages. Cultures NGTD  - Paracentesis 11/21 with 4 L removed, neg for infection.     Weakness    - See physical deconditioning.       Physical deconditioning    - PT/OT consulted.  Progressing well w therapy.  - Recommend home health PT and rolling walker at discharge (orders placed 11/6).       Hypotension    - d/c'd midodrine as patient has not required it recently  -  Continue to monitor.     Anemia requiring transfusions    - Fluctuating H&H.    - Transfusions: 10/14; 10/16; 10/18; 10/19; 10/22; 10/27; 10/29, 10/31, 11/3, 11/21  - FOBT +  - LDH elevated, Hapto < 10.  - EGD 10/18: ulcerated duodenal mucosa.   - Consulted GI about this ongoing issue and further need of another EGD.  - EGD 11/5 unremarkable.   - Iron studies 11/13:  95, TIBC 107, sat iron 89, transferrin 72, ferritin pending  - Hemolytic anemia workup 11/13: Haptoglobin 132, retic 3.9   - Last transfused 1upRBC 11/21. H/H now improved.   - H/H decreased to 7.6/24. Ordered type and screen, but no transfusion planned for today.   - H/H 7/22.6 11/29 - transfused 1u PRBC  - Continue to monitor.           Severe malnutrition    - Dietary consulted.   - Temporal and distal extremity muscle wasting and edema.  - Encourage supplements and to increase nutritional intake.  - Per nephrology, pt to be on low protein diet.  - Prealbumin reviewed.   - Tolerating diet.  Denies n/v.    -Told pt it's ok to resume outside protein shakes as renal fx stable, but will monitor closely.  - Will place pt on low fat, high protein diet due to suspicion for chyle leak.         Type 2 diabetes mellitus with diabetic polyneuropathy, with long-term current use of insulin    - Continue home regimen.  - Endocrine consulted.  - Pt off insulin drip at this time.  Apprec endo recs.        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  - Tx w/ Entecavir- dose changed to daily 11/26 due to improved renal fx             VTE Risk Mitigation (From admission, onward)        Ordered     heparin (porcine) injection 5,000 Units  Every 8 hours      11/22/18 1014     IP VTE HIGH RISK PATIENT  Once      11/05/18 1145     Place sequential compression device  Until discontinued      10/05/18 0709          The patients clinical status was discussed at multidisplinary rounds, involving transplant  surgery, transplant medicine, pharmacy, nursing, nutrition, and social work    Discharge Planning:  No Patient Care Coordination Note on file.      Elizabeth Dean PA-C  Liver Transplant  [facility]

## 2018-11-29 NOTE — SUBJECTIVE & OBJECTIVE
Scheduled Meds:   aspirin  81 mg Oral Daily    atovaquone  1,500 mg Oral Daily    entecavir  0.5 mg Oral Daily    furosemide  40 mg Oral BID    heparin (porcine)  5,000 Units Subcutaneous Q8H    insulin aspart U-100  7-14 Units Subcutaneous TIDWM    insulin detemir U-100  6 Units Subcutaneous Daily    k phos di & mono-sod phos mono  2 tablet Oral BID    magnesium oxide  800 mg Oral BID    metoprolol tartrate  12.5 mg Oral BID    pantoprazole  40 mg Oral BID    predniSONE  5 mg Oral Daily    Followed by    [START ON 12/1/2018] predniSONE  2.5 mg Oral Daily    tacrolimus  1 mg Oral BID     Continuous Infusions:  PRN Meds:sodium chloride, acetaminophen, bisacodyl, calcium carbonate, dextrose 50%, dextrose 50%, glucagon (human recombinant), glucose, glucose, insulin aspart U-100, omnipaque, ondansetron, ondansetron, polyethylene glycol, sodium chloride 0.9%, traMADol    Review of Systems   Constitutional: Positive for activity change and appetite change (improving). Negative for chills, fatigue and fever.   HENT: Negative for congestion, facial swelling and trouble swallowing.    Eyes: Negative for pain, discharge and visual disturbance.   Respiratory: Negative for cough, chest tightness, shortness of breath and wheezing.    Cardiovascular: Positive for leg swelling. Negative for chest pain and palpitations.   Gastrointestinal: Positive for abdominal distention and abdominal pain. Negative for constipation, diarrhea, nausea and vomiting.   Endocrine: Negative.    Genitourinary: Negative for decreased urine volume, difficulty urinating and dysuria.   Musculoskeletal: Positive for back pain and neck pain. Negative for arthralgias and neck stiffness.   Skin: Positive for wound.   Allergic/Immunologic: Positive for immunocompromised state.   Neurological: Positive for weakness. Negative for tremors and headaches.   Psychiatric/Behavioral: Negative for confusion and sleep disturbance.     Objective:     Vital  Signs (Most Recent):  Temp: 98.8 °F (37.1 °C) (11/29/18 1510)  Pulse: (!) 116 (11/29/18 1510)  Resp: 18 (11/29/18 1510)  BP: 135/75 (11/29/18 1510)  SpO2: 97 % (11/29/18 1510) Vital Signs (24h Range):  Temp:  [98.3 °F (36.8 °C)-99.6 °F (37.6 °C)] 98.8 °F (37.1 °C)  Pulse:  [100-134] 116  Resp:  [13-25] 18  SpO2:  [95 %-100 %] 97 %  BP: (113-151)/(58-82) 135/75     Weight: 60.3 kg (132 lb 15 oz)(standing scale)  Body mass index is 23.55 kg/m².    Intake/Output - Last 3 Shifts       11/27 0700 - 11/28 0659 11/28 0700 - 11/29 0659 11/29 0700 - 11/30 0659    P.O. 360 2010     I.V. (mL/kg)  100 (1.7)     IV Piggyback 100 100     Total Intake(mL/kg) 460 (7.6) 2210 (36.7)     Urine (mL/kg/hr) 0 (0) 1950 (1.3)     Other 200 150     Stool 0 0     Total Output 200 2100     Net +260 +110            Urine Occurrence 6 x 4 x     Stool Occurrence 0 x 5 x           Physical Exam   Constitutional: She is oriented to person, place, and time. She appears well-developed. No distress.   Temporal and distal extremity muscle wasting   HENT:   Head: Normocephalic and atraumatic.   Eyes: EOM are normal. No scleral icterus.   Neck: Normal range of motion.   Cardiovascular: Normal rate, regular rhythm and normal heart sounds.   No murmur heard.  Pulmonary/Chest: Effort normal. No respiratory distress. She has no wheezes.   Breath sounds decreased at bases   Abdominal: Soft. Bowel sounds are normal. She exhibits distension. There is tenderness. There is no guarding.   Wound vac placed to middle chevron incision + R lateral side, no s/s/i  Erythema + induration RLQ, improving  RLQ VALORIE drain sites c/d/i with sutures   Musculoskeletal: Normal range of motion. She exhibits edema (3+ BLE, + generalized).   Neurological: She is alert and oriented to person, place, and time.   Skin: Skin is warm. She is not diaphoretic.   Psychiatric: She has a normal mood and affect.   Nursing note and vitals reviewed.      Laboratory:  Immunosuppressants          Stop Route Frequency     tacrolimus capsule 1 mg      -- Oral 2 times daily        CBC:   Recent Labs   Lab 11/29/18  0734   WBC 2.94*   RBC 2.49*   HGB 7.0*   HCT 22.6*   *   MCV 91   MCH 28.1   MCHC 31.0*     CMP:   Recent Labs   Lab 11/29/18  0734   *   CALCIUM 8.4*   ALBUMIN 2.9*   PROT 4.9*      K 3.3*   CO2 28   CL 98   BUN 58*   CREATININE 1.2   ALKPHOS 123   ALT 10   AST 16   BILITOT 1.1*     Labs within the past 24 hours have been reviewed.    Diagnostic Results:  I have personally reviewed all pertinent imaging studies.Scheduled Meds:   aspirin  81 mg Oral Daily    atovaquone  1,500 mg Oral Daily    entecavir  0.5 mg Oral Daily    furosemide  40 mg Oral BID    heparin (porcine)  5,000 Units Subcutaneous Q8H    insulin aspart U-100  7-14 Units Subcutaneous TIDWM    insulin detemir U-100  6 Units Subcutaneous Daily    k phos di & mono-sod phos mono  2 tablet Oral BID    magnesium oxide  800 mg Oral BID    metoprolol tartrate  12.5 mg Oral BID    pantoprazole  40 mg Oral BID    predniSONE  5 mg Oral Daily    Followed by    [START ON 12/1/2018] predniSONE  2.5 mg Oral Daily    tacrolimus  1 mg Oral BID     Continuous Infusions:  PRN Meds:sodium chloride, acetaminophen, bisacodyl, calcium carbonate, dextrose 50%, dextrose 50%, glucagon (human recombinant), glucose, glucose, insulin aspart U-100, omnipaque, ondansetron, ondansetron, polyethylene glycol, sodium chloride 0.9%, traMADol    Review of Systems    Objective:     Vital Signs (Most Recent):  Temp: 98.8 °F (37.1 °C) (11/29/18 1510)  Pulse: (!) 116 (11/29/18 1510)  Resp: 18 (11/29/18 1510)  BP: 135/75 (11/29/18 1510)  SpO2: 97 % (11/29/18 1510) Vital Signs (24h Range):  Temp:  [98.3 °F (36.8 °C)-99.6 °F (37.6 °C)] 98.8 °F (37.1 °C)  Pulse:  [100-134] 116  Resp:  [13-25] 18  SpO2:  [95 %-100 %] 97 %  BP: (113-151)/(58-82) 135/75     Weight: 60.3 kg (132 lb 15 oz)(standing scale)  Body mass index is 23.55  kg/m².    Intake/Output - Last 3 Shifts       11/27 0700 - 11/28 0659 11/28 0700 - 11/29 0659 11/29 0700 - 11/30 0659    P.O. 360 2010     I.V. (mL/kg)  100 (1.7)     IV Piggyback 100 100     Total Intake(mL/kg) 460 (7.6) 2210 (36.7)     Urine (mL/kg/hr) 0 (0) 1950 (1.3)     Other 200 150     Stool 0 0     Total Output 200 2100     Net +260 +110            Urine Occurrence 6 x 4 x     Stool Occurrence 0 x 5 x           Physical Exam      Laboratory:  Immunosuppressants         Stop Route Frequency     tacrolimus capsule 1 mg      -- Oral 2 times daily        CBC:   Recent Labs   Lab 11/29/18  0734   WBC 2.94*   RBC 2.49*   HGB 7.0*   HCT 22.6*   *   MCV 91   MCH 28.1   MCHC 31.0*     CMP:   Recent Labs   Lab 11/29/18  0734   *   CALCIUM 8.4*   ALBUMIN 2.9*   PROT 4.9*      K 3.3*   CO2 28   CL 98   BUN 58*   CREATININE 1.2   ALKPHOS 123   ALT 10   AST 16   BILITOT 1.1*     Coagulation: No results for input(s): PT, INR, APTT in the last 168 hours.  Labs within the past 24 hours have been reviewed.    Diagnostic Results:  I have personally reviewed all pertinent imaging studies.

## 2018-11-29 NOTE — PROGRESS NOTES
Notified by RT that pt refusing flutter device. Pt  asking RT why doing this at night and told her that it should be done during the day.   Pt had just gotten settled into bed for the night when RT arrived to do tx.

## 2018-11-29 NOTE — ASSESSMENT & PLAN NOTE
- CRRT 10/14, 10/17, 10/20, 10/31.  - CXR 10/30: abundant fluid in b/l pleural space.  - 100 mg total Lasix given 11/27.   - Continue 40 mg Lasix BID.  - Monitor daily weights.

## 2018-11-29 NOTE — CARE UPDATE
Patient off unit when rounding    BG goal 140-180    Start Levemir 6 units daily, first dose this am  Novolog mealtime insulin dose to 7-14.  Administer    7    units subcutaneously with meals if patient eats < 50% or low carb meal; administer    14    units if patient eats >/= to 50% or high carb meal  Low dose correction scale given kidney function.  BG monitoring AC/HS

## 2018-11-29 NOTE — PROGRESS NOTES
MD on call for LTS notified of pt HR sustaining in 130's when OOB to bathroom. Pt not symptomatic and has had this issue in the past.   MS stated that if once pt back resting in bed and HR still persistently in 130's to call back.

## 2018-11-29 NOTE — ASSESSMENT & PLAN NOTE
- Urine Cx 10/15 w/ Klebsiella pneumonia.  - Started Ciprofloxacin (10/17-10/19).   - Started on amoxicillin/levo for H. Pylori (completed abx 11/2).  - Pt c/o lower back pain 11/22. UA w/ trace leuks, moderate wbs, 1+ blood, cx pending  - Pt symptomatic (back pain) & with ESBL in wound previously.  Ertapenem 1g QD x 3 days (started 11/23), per ID recs.  Given pt cont to have back pain and concern for infection in wound ( thick drainage and increased drainage)-  extended Ertapenem for 4 more days per ID (stop date 11/29/18).

## 2018-11-29 NOTE — ASSESSMENT & PLAN NOTE
- Fluctuating H&H.    - Transfusions: 10/14; 10/16; 10/18; 10/19; 10/22; 10/27; 10/29, 10/31, 11/3, 11/21  - FOBT +  - LDH elevated, Hapto < 10.  - EGD 10/18: ulcerated duodenal mucosa.   - Consulted GI about this ongoing issue and further need of another EGD.  - EGD 11/5 unremarkable.   - Iron studies 11/13:  95, TIBC 107, sat iron 89, transferrin 72, ferritin pending  - Hemolytic anemia workup 11/13: Haptoglobin 132, retic 3.9   - Last transfused 1upRBC 11/21. H/H now improved.   - H/H decreased to 7.6/24. Ordered type and screen, but no transfusion planned for today.   - H/H 7/22.6 11/29 - transfused 1u PRBC  - Continue to monitor.

## 2018-11-29 NOTE — ASSESSMENT & PLAN NOTE
- Valcyte for CMV prophylaxis--> holding (10/31) for leukopenia. Pt will need weekly CMV PCR while discontinued.  - CMV PCR noted to be positive at 129. .  - CMV PCR 11/8: 105  - CMV PCR 11/15 with 89 copies  - CMV PCR 11/23 95 - increased from previous. Continue to hold Valcyte & monitor pt closely.  - CMV PCR 11/29 pending  - Bactrim for PCP prophylaxis ([facility]).--> holding (10/31) for leukopenia.   - Start Atovaquone 11/6.  - Fluconazole for fungal prophylaxis (first dose 10/6). D/c Fluc 11/16 bc pt had been on it for > 30 days post take back.

## 2018-11-29 NOTE — ASSESSMENT & PLAN NOTE
- Middle chevron incision opened 11/16 with purulent drainage  - Packed with Aquacel ag at this time. Growing Klebsiella p.   - CT abd/pelvis 11/17- no signs of infection  - 11/17: R side of chevron incision w/ 3 small holes draining purulent fluid, opened and packed w/ aquacel.   - Wound care following.  - Wound w/ increased drainage, so wound care placed vac 11/24/18.  Apprec recs. Extending Ertapenem thru 11/28 per ID.    - Abdomen erythematous and indurated RLQ inferior to chevron.  - CT abd/pelvis 11/26 - no sign of infection  - Next wound vac change 11/30.    .

## 2018-11-29 NOTE — PROGRESS NOTES
Did not get daily wt on pt this AM. Pt back in bed and finally sleeping after not much sleep overnight.  Do not want to wake pt up to stand up on scale and bed scale not working at this time.

## 2018-11-29 NOTE — SUBJECTIVE & OBJECTIVE
"Interval HPI:   Overnight events: Remains in TSU, poor sleep overnight.  Increased HR noted.  NPO for IR drainage today.  BG better controlled yesterday but elevated this am (299).  On IV antibiotics.  Prednisone 5 mg daily, standard steroid taper.  Eating:   NPO  Nausea: No  Hypoglycemia and intervention: No  Fever: No  TPN and/or TF: No    /67   Pulse (!) 118   Temp 98.3 °F (36.8 °C) (Oral)   Resp 19   Ht 5' 3" (1.6 m)   Wt 60.3 kg (132 lb 15 oz) Comment: standing scale  LMP  (LMP Unknown)   SpO2 99%   Breastfeeding? No   BMI 23.55 kg/m²     Labs Reviewed and Include    Recent Labs   Lab 11/29/18  0734   *   CALCIUM 8.4*   ALBUMIN 2.9*   PROT 4.9*      K 3.3*   CO2 28   CL 98   BUN 58*   CREATININE 1.2   ALKPHOS 123   ALT 10   AST 16   BILITOT 1.1*     Lab Results   Component Value Date    WBC 2.94 (L) 11/29/2018    HGB 7.0 (L) 11/29/2018    HCT 22.6 (L) 11/29/2018    MCV 91 11/29/2018     (L) 11/29/2018     No results for input(s): TSH, FREET4 in the last 168 hours.  Lab Results   Component Value Date    HGBA1C 6.0 (H) 10/04/2018       Nutritional status:   Body mass index is 23.55 kg/m².  Lab Results   Component Value Date    ALBUMIN 2.9 (L) 11/29/2018    ALBUMIN 2.6 (L) 11/28/2018    ALBUMIN 2.6 (L) 11/27/2018     Lab Results   Component Value Date    PREALBUMIN 15 (L) 11/03/2018    PREALBUMIN 12 (L) 09/17/2018       Estimated Creatinine Clearance: 36.6 mL/min (based on SCr of 1.2 mg/dL).    Accu-Checks  Recent Labs     11/27/18  1212 11/27/18  1719 11/27/18  2142 11/27/18  2226 11/28/18  0027 11/28/18  0846 11/28/18  1227 11/28/18  1730 11/28/18  2212 11/29/18  0834   POCTGLUCOSE 324* 149* 61* 89 126* 238* 269* 204* 130* 299*       Current Medications and/or Treatments Impacting Glycemic Control  Immunotherapy:    Immunosuppressants         Stop Route Frequency     tacrolimus capsule 1 mg      -- Oral 2 times daily        Steroids:   Hormones (From admission, onward)    Start "     Stop Route Frequency Ordered    12/01/18 0900  predniSONE tablet 2.5 mg      12/08 0859 Oral Daily 11/05/18 1004    11/24/18 0900  predniSONE tablet 5 mg      12/01 0859 Oral Daily 11/05/18 1004    10/17/18 0945  predniSONE tablet 15 mg      10/24 0859 Oral Daily 10/17/18 0943        Pressors:    Autonomic Drugs (From admission, onward)    Start     Stop Route Frequency Ordered    10/06/18 1617  rocuronium (ZEMURON) 10 mg/mL injection     Comments:  Created by cabinet overrholley    10/07 0429   10/06/18 1617        Hyperglycemia/Diabetes Medications:   Antihyperglycemics (From admission, onward)    Start     Stop Route Frequency Ordered    11/28/18 1300  insulin aspart U-100 pen 7-14 Units      -- SubQ 3 times daily with meals 11/28/18 1247    10/14/18 0925  insulin aspart U-100 pen 0-5 Units      -- SubQ Before meals & nightly PRN 10/14/18 0825    10/07/18 1200  insulin regular (Humulin R) 100 Units in sodium chloride 0.9% 100 mL infusion      10/13 2100 IV Continuous 10/07/18 1055

## 2018-11-29 NOTE — PLAN OF CARE
Problem: Physical Therapy Goal  Goal: Physical Therapy Goal  Goals to be met by: 2018    Patient will increase functional independence with mobility by performin. Supine to sit with Contact Guard Assistance - not met  2. Sit to stand transfer with Supervision using LRAD or no AD - not met  3. Bed to chair transfer with Supervision using LRAD or no AD - not met  4. Gait  x 250 feet with Supervision using LRAD or no AD - not met  5. Stand for 3 minutes with Supervision to increase activity tolerance - not met   6. Lower extremity exercise program x15 reps per handout, with independence - not met           Outcome: Ongoing (interventions implemented as appropriate)  Progressing towards goals    Marcelino Bird DPT  2018

## 2018-11-29 NOTE — PT/OT/SLP PROGRESS
Occupational Therapy      Patient Name:  Scarlett Reddy   MRN:  [id number]    Patient not seen today secondary to patient refusing therapy due to being exhausted due to having a procedure this am (VALORIE drain insertion). Patient is also receiving blood. Will follow-up next treatment session.    ELLIS Crowley  11/29/2018

## 2018-11-29 NOTE — PLAN OF CARE
Problem: Patient Care Overview  Goal: Plan of Care Review  Outcome: Ongoing (interventions implemented as appropriate)  Plan of care reviewed with pt. And .  Pt. Remains free of falls/trauma/injury. Pt. Educated on diabetes control, healthy eating, glucose checks, and insulin administration. VALORIE drain to gravity. Wound vac to continuous suction. Pt. And  Voices understanding. Pt tolerating plan of care well. Will continue to monitor.

## 2018-11-29 NOTE — PT/OT/SLP PROGRESS
Physical Therapy Treatment    Patient Name:  Scarlett Reddy   MRN:  [id number]    Recommendations:     Discharge Recommendations:  home health PT, home health OT   Discharge Equipment Recommendations: walker, rolling   Barriers to discharge: None    Assessment:     Scarlett Reddy is a 68 y.o. female admitted with a medical diagnosis of Liver transplanted.  She presents with the following impairments/functional limitations:  weakness, impaired functional mobilty, gait instability, impaired endurance, impaired balance, impaired self care skills, edema, pain.  Pt tolerated session c c/o fatigue d/t not being able to sleep much.  Pt demo improved functional mobility - increased gait distance and decreased assistance level required for mobility as compared to previous session.  Performed transfer c min A and gait c CGA using RW.  Pt ambulating ~200ft c RW and demo decreased gait speed, narrow SINA, forefoot contact, decreased toe/floor clearance, mild FFP, and mild unsteadiness c turning.  Pt requiring multiple standing rest break during gait training d/t fatigue. Pt demo improved sit<>stand transfer c education on proper hand position and leaning forward to help clear hips off sitting surface.  Pt safe to amb c assistance of 1 person and RW.  Pt would benefit from continued skilled acute PT 4x/wk to improve functional mobility.      Rehab Prognosis:  good; patient would benefit from acute skilled PT services to address these deficits and reach maximum level of function.      Recent Surgery: Procedure(s) (LRB):  ANGIOGRAM-HEPATIC (Left) 10 Days Post-Op    Plan:     During this hospitalization, patient to be seen 4 x/week to address the above listed problems via gait training, therapeutic activities, therapeutic exercises, neuromuscular re-education  · Plan of Care Expires:  12/15/18   Plan of Care Reviewed with: patient, spouse()    Subjective     Communicated with RN prior to session.  Patient found sitting up in  "sofa and  present upon PT entry to room, agreeable to treatment.      Chief Complaint: abdominal discomfort  Patient comments/goals: "I didn't sleep much yesterday." - while performing sit<>stand training (translated by .  Pain/Comfort:  · Pain Rating 1: (reports abdominal/B flank pain but did not rate)  · Pain Addressed 1: Reposition, Distraction    Patients cultural, spiritual, Zoroastrianism conflicts given the current situation: none    Objective:     Patient found with: telemetry, pulse ox (continuous), peripheral IV, wound vac     General Precautions: Standard, fall, contact   Orthopedic Precautions:N/A   Braces: N/A     Functional Mobility:  · Transfers:     · Sit to Stand:  minimum assistance with rolling walker  · Gait: ~200ft c RW CGA   · decreased gait speed, narrow SINA, forefoot contact, decreased toe/floor clearance, mild FFP, and mild unsteadiness c turning.  · Pt requiring multiple standing rest break during gait training d/t fatigue  · Balance: sitting (S); standing (CGA)      AM-PAC 6 CLICK MOBILITY  Turning over in bed (including adjusting bedclothes, sheets and blankets)?: 3  Sitting down on and standing up from a chair with arms (e.g., wheelchair, bedside commode, etc.): 3  Moving from lying on back to sitting on the side of the bed?: 2  Moving to and from a bed to a chair (including a wheelchair)?: 3  Need to walk in hospital room?: 3  Climbing 3-5 steps with a railing?: 2  Basic Mobility Total Score: 16       Therapeutic Activities and Exercises:  Pt educated on: progress; safety c mobility; benefits of OOB activities; performing therex; d/c recs - v/u  Static standing 5x~30secs Sit<>stand from bedside chair: 5x   -cues for proper hand placement and leaning forward to off load hips  Therex (AP, LAQ, hip flex) 2c10 ea    Patient left up in chair with all lines intact, call button in reach, RN notified and  and  present..    GOALS:   Multidisciplinary Problems     " Physical Therapy Goals        Problem: Physical Therapy Goal    Goal Priority Disciplines Outcome Goal Variances Interventions   Physical Therapy Goal     PT, PT/OT Ongoing (interventions implemented as appropriate)     Description:  Goals to be met by: 2018    Patient will increase functional independence with mobility by performin. Supine to sit with Contact Guard Assistance - not met  2. Sit to stand transfer with Supervision using LRAD or no AD - not met  3. Bed to chair transfer with Supervision using LRAD or no AD - not met  4. Gait  x 250 feet with Supervision using LRAD or no AD - not met  5. Stand for 3 minutes with Supervision to increase activity tolerance - not met   6. Lower extremity exercise program x15 reps per handout, with independence - not met                            Time Tracking:     PT Received On: 18  PT Start Time: 1029     PT Stop Time: 1055  PT Total Time (min): 26 min     Billable Minutes: Gait Training 12 min and Therapeutic Activity 14 min    Treatment Type: Treatment  PT/PTA: PT     PTA Visit Number: 0     Marcelino Bird, PT  2018

## 2018-11-29 NOTE — PLAN OF CARE
Problem: Patient Care Overview  Goal: Plan of Care Review  Outcome: Ongoing (interventions implemented as appropriate)  Glucoses monitored. Novolog given as ordered. Kosher food provided to pt from outside per hospital per .   stated ate 50% potato soup for supper.  Reinforced to wear non-skid socks when ambulating to prevent falling. Afebrile. Last dose of Ertepenem given. Skin intact. Liver tests stable. Voiced concern for increased HR. Explained MD is aware of increased HR. Liver tests stable.  reviewed self-med protocol today with .  prepared self-meds this afternoon and for tonight. Explained to pt to be NPO after MN tonight. Pt has sat chair all day today. Pt ambulated to BR using walker with assistance of 1 person. C/O lower back pain. Stated did not want pain meds this am. Pt requested to only take 325mg Tylenol for pain this evening. Stated pain decreased. Edema remains. Lasix increased to BID. Albumin given as ordered. Midodrine discontinued.  remains at BS. Utilized  to communicate today.

## 2018-11-29 NOTE — ASSESSMENT & PLAN NOTE
BG goal 140-180    Increase Levemir to 8 units daily  Novolog mealtime insulin dose to 7-14.  Administer    7    units subcutaneously with meals if patient eats < 50% or low carb meal; administer    14    units if patient eats >/= to 50% or high carb meal  Low dose correction scale given kidney function.  BG monitoring AC/HS    Start self/caregiver administration of insulin to review for home.     Discharge planning:  TBD

## 2018-11-29 NOTE — ASSESSMENT & PLAN NOTE
- Pt w/ tachycardia 2-3 days, improved today, but still present.  - Start Metoprolol 12.5 mg qd for rate control.  - Monitor closely.

## 2018-11-30 LAB
ALBUMIN SERPL BCP-MCNC: 2.9 G/DL
ALP SERPL-CCNC: 128 U/L
ALT SERPL W/O P-5'-P-CCNC: 10 U/L
ANION GAP SERPL CALC-SCNC: 11 MMOL/L
AST SERPL-CCNC: 19 U/L
BASOPHILS # BLD AUTO: 0.03 K/UL
BASOPHILS NFR BLD: 1 %
BILIRUB SERPL-MCNC: 1.5 MG/DL
BUN SERPL-MCNC: 55 MG/DL
CALCIUM SERPL-MCNC: 8.6 MG/DL
CHLORIDE SERPL-SCNC: 96 MMOL/L
CO2 SERPL-SCNC: 29 MMOL/L
CREAT SERPL-MCNC: 1.2 MG/DL
DIFFERENTIAL METHOD: ABNORMAL
EOSINOPHIL # BLD AUTO: 0 K/UL
EOSINOPHIL NFR BLD: 1 %
ERYTHROCYTE [DISTWIDTH] IN BLOOD BY AUTOMATED COUNT: 16.1 %
EST. GFR  (AFRICAN AMERICAN): 53.3 ML/MIN/1.73 M^2
EST. GFR  (NON AFRICAN AMERICAN): 46.2 ML/MIN/1.73 M^2
GLUCOSE FLD-MCNC: 278 MG/DL
GLUCOSE SERPL-MCNC: 287 MG/DL
HCT VFR BLD AUTO: 28.2 %
HGB BLD-MCNC: 9 G/DL
IMM GRANULOCYTES # BLD AUTO: 0.08 K/UL
IMM GRANULOCYTES NFR BLD AUTO: 2.6 %
LYMPHOCYTES # BLD AUTO: 0.3 K/UL
LYMPHOCYTES NFR BLD: 8.8 %
MAGNESIUM SERPL-MCNC: 1.5 MG/DL
MCH RBC QN AUTO: 28.9 PG
MCHC RBC AUTO-ENTMCNC: 31.9 G/DL
MCV RBC AUTO: 91 FL
MONOCYTES # BLD AUTO: 0.4 K/UL
MONOCYTES NFR BLD: 11.7 %
NEUTROPHILS # BLD AUTO: 2.3 K/UL
NEUTROPHILS NFR BLD: 74.9 %
NRBC BLD-RTO: 0 /100 WBC
PHOSPHATE SERPL-MCNC: 2.5 MG/DL
PLATELET # BLD AUTO: 110 K/UL
PMV BLD AUTO: 10.4 FL
POCT GLUCOSE: 129 MG/DL (ref 70–110)
POCT GLUCOSE: 193 MG/DL (ref 70–110)
POCT GLUCOSE: 264 MG/DL (ref 70–110)
POCT GLUCOSE: 301 MG/DL (ref 70–110)
POCT GLUCOSE: 323 MG/DL (ref 70–110)
POTASSIUM SERPL-SCNC: 4 MMOL/L
PROT SERPL-MCNC: 5.2 G/DL
RBC # BLD AUTO: 3.11 M/UL
SODIUM SERPL-SCNC: 136 MMOL/L
SPECIMEN SOURCE: NORMAL
SPECIMEN SOURCE: NORMAL
TACROLIMUS BLD-MCNC: 4.7 NG/ML
TRIGL FLD-MCNC: 2208 MG/DL
WBC # BLD AUTO: 3.07 K/UL

## 2018-11-30 PROCEDURE — 85025 COMPLETE CBC W/AUTO DIFF WBC: CPT

## 2018-11-30 PROCEDURE — 97607 NEG PRS WND THR NDME<=50SQCM: CPT

## 2018-11-30 PROCEDURE — 63600175 PHARM REV CODE 636 W HCPCS: Performed by: PHYSICIAN ASSISTANT

## 2018-11-30 PROCEDURE — 99233 SBSQ HOSP IP/OBS HIGH 50: CPT | Mod: ,,, | Performed by: INTERNAL MEDICINE

## 2018-11-30 PROCEDURE — 36415 COLL VENOUS BLD VENIPUNCTURE: CPT

## 2018-11-30 PROCEDURE — 84100 ASSAY OF PHOSPHORUS: CPT

## 2018-11-30 PROCEDURE — 63600175 PHARM REV CODE 636 W HCPCS: Performed by: NURSE PRACTITIONER

## 2018-11-30 PROCEDURE — 99900035 HC TECH TIME PER 15 MIN (STAT)

## 2018-11-30 PROCEDURE — 20600001 HC STEP DOWN PRIVATE ROOM

## 2018-11-30 PROCEDURE — 25000003 PHARM REV CODE 250: Performed by: PHYSICIAN ASSISTANT

## 2018-11-30 PROCEDURE — 99232 SBSQ HOSP IP/OBS MODERATE 35: CPT | Mod: ,,, | Performed by: NURSE PRACTITIONER

## 2018-11-30 PROCEDURE — 80197 ASSAY OF TACROLIMUS: CPT

## 2018-11-30 PROCEDURE — 25000003 PHARM REV CODE 250: Performed by: TRANSPLANT SURGERY

## 2018-11-30 PROCEDURE — 94664 DEMO&/EVAL PT USE INHALER: CPT

## 2018-11-30 PROCEDURE — 83735 ASSAY OF MAGNESIUM: CPT

## 2018-11-30 PROCEDURE — 25000003 PHARM REV CODE 250: Performed by: NURSE PRACTITIONER

## 2018-11-30 PROCEDURE — 99233 SBSQ HOSP IP/OBS HIGH 50: CPT | Mod: 24,,, | Performed by: PHYSICIAN ASSISTANT

## 2018-11-30 PROCEDURE — 94761 N-INVAS EAR/PLS OXIMETRY MLT: CPT

## 2018-11-30 PROCEDURE — 80053 COMPREHEN METABOLIC PANEL: CPT

## 2018-11-30 RX ORDER — MAGNESIUM SULFATE HEPTAHYDRATE 40 MG/ML
2 INJECTION, SOLUTION INTRAVENOUS ONCE
Status: COMPLETED | OUTPATIENT
Start: 2018-11-30 | End: 2018-11-30

## 2018-11-30 RX ORDER — METOPROLOL TARTRATE 25 MG/1
25 TABLET, FILM COATED ORAL 2 TIMES DAILY
Status: DISCONTINUED | OUTPATIENT
Start: 2018-11-30 | End: 2018-12-21 | Stop reason: HOSPADM

## 2018-11-30 RX ADMIN — FUROSEMIDE 40 MG: 40 TABLET ORAL at 05:11

## 2018-11-30 RX ADMIN — PREDNISONE 5 MG: 5 TABLET ORAL at 08:11

## 2018-11-30 RX ADMIN — METOPROLOL TARTRATE 25 MG: 25 TABLET ORAL at 08:11

## 2018-11-30 RX ADMIN — DIBASIC SODIUM PHOSPHATE, MONOBASIC POTASSIUM PHOSPHATE AND MONOBASIC SODIUM PHOSPHATE 2 TABLET: 852; 155; 130 TABLET ORAL at 08:11

## 2018-11-30 RX ADMIN — MAGNESIUM OXIDE TAB 400 MG (241.3 MG ELEMENTAL MG) 800 MG: 400 (241.3 MG) TAB at 08:11

## 2018-11-30 RX ADMIN — INSULIN ASPART 14 UNITS: 100 INJECTION, SOLUTION INTRAVENOUS; SUBCUTANEOUS at 08:11

## 2018-11-30 RX ADMIN — PANTOPRAZOLE SODIUM 40 MG: 40 TABLET, DELAYED RELEASE ORAL at 08:11

## 2018-11-30 RX ADMIN — INSULIN ASPART 7 UNITS: 100 INJECTION, SOLUTION INTRAVENOUS; SUBCUTANEOUS at 06:11

## 2018-11-30 RX ADMIN — INSULIN ASPART 4 UNITS: 100 INJECTION, SOLUTION INTRAVENOUS; SUBCUTANEOUS at 08:11

## 2018-11-30 RX ADMIN — ATOVAQUONE 1500 MG: 750 SUSPENSION ORAL at 08:11

## 2018-11-30 RX ADMIN — FUROSEMIDE 40 MG: 40 TABLET ORAL at 08:11

## 2018-11-30 RX ADMIN — ASPIRIN 81 MG CHEWABLE TABLET 81 MG: 81 TABLET CHEWABLE at 08:11

## 2018-11-30 RX ADMIN — TACROLIMUS 1 MG: 1 CAPSULE ORAL at 05:11

## 2018-11-30 RX ADMIN — INSULIN ASPART 14 UNITS: 100 INJECTION, SOLUTION INTRAVENOUS; SUBCUTANEOUS at 01:11

## 2018-11-30 RX ADMIN — INSULIN ASPART 3 UNITS: 100 INJECTION, SOLUTION INTRAVENOUS; SUBCUTANEOUS at 01:11

## 2018-11-30 RX ADMIN — ENTECAVIR 0.5 MG: 0.5 TABLET ORAL at 08:11

## 2018-11-30 RX ADMIN — MAGNESIUM SULFATE IN WATER 2 G: 40 INJECTION, SOLUTION INTRAVENOUS at 11:11

## 2018-11-30 RX ADMIN — TACROLIMUS 1 MG: 1 CAPSULE ORAL at 08:11

## 2018-11-30 RX ADMIN — METOPROLOL TARTRATE 12.5 MG: 25 TABLET, FILM COATED ORAL at 08:11

## 2018-11-30 RX ADMIN — ACETAMINOPHEN 650 MG: 325 TABLET, FILM COATED ORAL at 12:11

## 2018-11-30 RX ADMIN — HEPARIN SODIUM 5000 UNITS: 5000 INJECTION, SOLUTION INTRAVENOUS; SUBCUTANEOUS at 08:11

## 2018-11-30 RX ADMIN — VANCOMYCIN HYDROCHLORIDE 750 MG: 750 INJECTION, POWDER, LYOPHILIZED, FOR SOLUTION INTRAVENOUS at 03:11

## 2018-11-30 NOTE — PROGRESS NOTES
[facility]  Liver Transplant  Progress Note    Patient Name: Scarlett Reddy  MRN: [id number]  Admission Date: 10/1/2018  Hospital Length of Stay: 59 days  Code Status: Full Code  Primary Care Provider: Primary Doctor No  Post-Operative Day: 56    ORGAN:   LIVER  Disease Etiology: Cirrhosis: Type B and D  Donor Type:    - Brain Death  CDC High Risk:   No  Donor CMV Status:   Donor CMV Status: Positive  Donor HBcAB:   Negative  Donor HCV Status:   Negative  Donor HBV SETH: Negative  Donor HCV SETH: Negative  Whole or Partial: Whole Liver  Biliary Anastomosis: End to End  Arterial Anatomy: Replaced Left Hepatic and Right Hepatic  Subjective:     History of Present Illness:  Ms. Reddy is a 69 y/o female with PMH of ESLD secondary Hep B and D.  Listed for liver transplant with MELD 23.  Paracentesis 10/1 with 5L removed, no fluid sent for analysis.  Morning labs significant for hyponatremia, Na 119.  Pt admitted to LTS for sodium monitoring.      Hospital Course: 69 y/o F h/o HBV/delta cirrhosis s/p DBDLT 10/5/18 (CMV D+/R+, steroid induction, MMF/tacro/pred maintenance) with take back on 10/6 2/2 hyperbilirubinema and bilious VALORIE drainage, no leak identified. Post-op course c/b hypercapneic and hypoxic respiratory failure and was reintubated though quickly extubated now doing well. Liver bx (10/6) sig for Zone 3 hemorrhage and collapse, in addition to cholestasis. Transferred from ICU on POD #4. ID consulted to comment on positive diphtheroid culture from ascitic fluid (likely skin colonization) as well as ESBL Klebsiella pneumoniae in urine culture (10/4).  Pt asymptomatic and cell count from ascitic fluid unremarkable. Per ID recs, no need to treat diphtheroids or asymptomatic ESBL Kleb pneumo bacteriuria though pt has had 6 days of therapy which would cover these organisms. Mild peritransplant ascites- repeat US 10/6 with increased arterial resistive indices. Repeat US 10/9 w/ continued  elevation in RIs and fluid collections. LFTs trending down nicely.     Acute gradual worsening of renal function s/p OLTx. Creatinine on arrival 0.8 and has been up trending. Nephrology consulted for post-op EVA. Cr cont to trend up but remains to have good UOP. Had stable response to lasiz diuresis.  BUN elevated requiring multiple rounds of dialysis (10/15, 10/17, 10/20, 10/31). No significant improvements in kidney function noted.   In addition, pt H/H cont to fluctuate requiring multiple blood transfusions (10/14, 10/16, 10/18, 10/19, 10/22, 10/27, 10/29).  Pt reported dark stools 10/15 but color has normalized. FOBT+. EGD 10/18 consistent with ulcerated mucosa in duodenum s/p coagulation. Concern for H. Pylori. Started Amoxicillin/levofloxacin (10/19, complete 11/2)). Remains on Protonix 40 mg BID. Biopsy negative for infection and CMV. Will cont close monitoring H/H, transfuse as needed with goal Hb > 7.0. Of note, Urine cx + Klebsiella pneumoniae (10/13). Received 3 day course of ciprofloxacin, dc'd 10/19.   On 10/19 pm pt c/o crushing chest pain. Troponin 0.094, likely 2/2 ischemic stress. EKG NSR.   Liver US 11/6 showed no arterial flow. Paracentesis 11/7 with 8.9 L removed. Wbc 39, segs 16%. Gram stain no organisms seen + no WBC. Pt feels better since having fluid removed but she continues to have worsening abdominal distention & LE edema.Repeat Liver US 11/8 showed arterial flow with increased RIs. Paracentesis 11/12 with 8.6L removed (WBC 34, segs 25%). 11/15 liver u/s showed abnormally decreased main hepatic artery peak systolic velocity with slow arterial flow and abnormal tardus parvus waveforms intrahepatically. Consulted vascular surgery. Repeat liver US 11/19 showed no arterial flow on R and sluggish flow on L. Hepatic angiogram 11/19 confirmed HAT.  Friday 11/16, purulent drainage noted from middle of chevron incision. Area opened and packed. Cx grew klebsiella p. CT scan showed no signs of  infection. On Monday 11/18, it was noted that the right/lateral side of the chevron had 3 small openings draining purulent fluid. Opened the area and packed. Vac placed on 11/24. Wound care following.    Interval history:  No acute events overnight. Pt reports improved sleep, but continues to have pain in her abdomen and back. Taking Tylenol PRN for pain (does not want anything stronger). Pt still tachy, but improving. Will increase metoprolol to 25 mg BID. LE improving. Continue Lasix 40 mg BID. Transfused 1uPRBC yesterday w/ good response. Wound vac to incision in place and changed today 11/30. IR placed drain placed yesterday. Color of drainage + 51% lymphs suggests chyle leak. Triglyceride level pending. 1.2 L drained yesterday. Goal to drain 1L again today and then change drain q4h until further notice. Plan to have patient follow a strict low fat, high protein diet over the weekend to see if chyle leak improves. If not, may need to have pt be NPO and try TPN. RD to see patient and explain today. Yesterday, patient finished day 7 of 7 of Ertapenem for kleb pneumo ESBL UTI. Of note, pt grew same bacteria from wound 11/16. Today, peritoneal fluid 11/29 growing gram positive cocci in chains, possible strep. Final cx pending. Vanc started. ID consulted. She is tolerating diet.  Denies diarrhea. Ambulating with walker. Pt c/o bleeding at heparin sites, but necessary to keep her on prophylaxis as she is not up and moving frequently. PICC team required for peripheral IVs (new one in today). May want to consider midline Monday if pt unlikely to d/c soon.    No new subjective & objective note has been filed under this hospital service since the last note was generated.    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   - POD 1 US: increased arterial  "resistive indices, suggestive of edema vs acute rejection vs congestion.  - Repeat US 10/9 with continued elevation of RIs.  - LFTs stable.  - T bili trending down.   - Repeat liver US (10/11): elevated RIs.  - Liver US 11/6 to assess for ascites. No arterial flow within the liver allograft concerning for interval thrombosis/occlusion of the arterial system. Severe ascites seen.  - Liver US 11/8: arterial flow seen w/ elevated RIs   - LFTs remain stable. No CTA at this time due to EVA and normal liver function.   - Liver US 11/15 showed "abnormally decreased main hepatic artery peak systolic velocity with slow arterial flow and abnormal tardus parvus waveforms intrahepatically."   -Vascular surgery consulted  - Liver US 11/19 showed no arterial flow on the right and sluggish flow on the left.   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 No further intervention planned at this time as patient's LFTs are stable. Will monitor.  - CT abd/pelvis 11/26 showed: 1. Persistent large volume of ascites. 2. Large right and moderate left pleural effusions with adjacent basilar atelectasis. 3. Anasarca  - IR placed perc drain 11/29 due to suspicion for chyle leak. 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  Fluid also growing gram positive cocci in chains, resembling strep. Final cx pending. Vanc started. ID consulted.          Tachycardia    - Pt w/ tachycardia 2-3 days, improved today, but still present.  - Started Metoprolol 12.5 mg BID 11/29 for rate control. HR improved, but still elevated. BP stable.  - 11/30 -> increased Metoprolol to 25 mg BID  - Monitor closely.       Pleural effusion    - Common post liver transplant  - CT 11/26 showed: Large right and moderate left pleural effusions with adjacent basilar atelectasis.  - Pt sp02 stable on RA, but c/o some orthopnea.  - Continue w/ diuresis.       Chronic kidney disease, stage III (moderate)    - Kidney fx improving  - " "Monitor       Hypomagnesemia    - Continue PO Mag Oxide  - IV Mag 2g 11/29 for Mag 1.3 + 2g 11/30 for Mag 1.5  - Continue to monitor.       Hypokalemia    - Replace as needed.   - Continue to monitor.     Hypophosphatemia    - Continue k phos and monitor closely.       Delayed surgical wound healing    - Middle chevron incision opened 11/16 with purulent drainage  - Packed with Aquacel ag at this time. Growing Klebsiella p.   - CT abd/pelvis 11/17- no signs of infection  - 11/17: R side of chevron incision w/ 3 small holes draining purulent fluid, opened and packed w/ aquacel.   - Wound care following.  - Wound w/ increased drainage, so wound care placed vac 11/24/18.  Apprec recs. Extending Ertapenem thru 11/28 per ID.    - Abdomen erythematous and indurated RLQ inferior to chevron.  - CT abd/pelvis 11/26 - no sign of infection  - Last wound vac change 11/30.    .       Hepatic artery thrombosis of transplanted liver    - Liver US 11/19 showed no arterial flow to R and sluggish flow to L  - Hepatic angiogram 11/19 confirmed HAT  - No further intervention planned at this time. Will continue to monitor.     Drug-induced pancytopenia    - Decreased plt, wbc, h/h  - See "leukopenia"  - Continue to monitor.     Thrombocytopenia, unspecified    - Monitor.       Alteration in skin integrity    - Monitor.       Leukopenia    - Stopped Cellcept, Bactrim, and Valcyte 10/31   - Cellcept half dose resumed 11/6  - Started Atovaquone 11/6.   - CMV PCR noted to be positive at 129. Will hold off on treatment with valcyte at this time. Monitoring weekly CMV PCRs.  - Cellcept held 11/23 2/2 infection (UTI + wound)  - Continue to monitor.       Azotemia    - CRRT 10/31 for uremic toxin clearance.           UTI (urinary tract infection)    - Urine Cx 10/15 w/ Klebsiella pneumonia.  - Started Ciprofloxacin (10/17-10/19).   - Started on amoxicillin/levo for H. Pylori (completed abx 11/2).  - Pt c/o lower back pain 11/22. UA w/ trace " "leuks, moderate wbs, 1+ blood, cx pending  - Pt symptomatic (back pain) & with ESBL in wound previously.  Ertapenem 1g QD x 3 days (started 11/23), per ID recs.  Given pt cont to have back pain and concern for infection in wound ( thick drainage and increased drainage)-  extended Ertapenem for 4 more days per ID (stop date 11/29/18).         Acute blood loss anemia    - Expected post transplant  - H&H cont to fluctuate.   - FOBT +.  - EGD 10/18 - showed diffuse bleeding with ulcerated duodenal mucosa.  - EGD 11/5 - improvement seen, no active bleeding.  - Resume heparin sq injections 11/6.   - Decrease heparin sq from TID to BID 11/15   - Heparin dc'd 11/17  - See "anemia requiring transfusions."  - Continue to monitor.             Post LT - Acute renal failure    - Creatinine on arrival 0.8 and trended up.  - Nephrology consulted.  - Renal US (10/11): no hydronephrosis.  - Echo 10/12: diastolic dysfunction, likely 2/2 to ARF.  - CRRT 10/14; SLED 10/17, 10/20. --> no improvements noted in kidney fxn.  - 24 hr urine creatine collection: CrCl of 7.   - CRRT 10/31 with 1L removed.   - See "hypervolemia associated with renal insufficiency"     Hypervolemia associated with renal insufficiency    - CRRT 10/14, 10/17, 10/20, 10/31.  - CXR 10/30: abundant fluid in b/l pleural space.  - Continue 40 mg Lasix BID.  - Monitor daily weights.       EVA (acute kidney injury)    - No plans for RRT in near future, per nephrology  - Trialysis line removed 11/13  - Nephrology involved. Appreciate recs.  - Cr improved & stable.  - Continue to monitor.     At risk for opportunistic infections    - Valcyte for CMV prophylaxis--> holding (10/31) for leukopenia. Pt will need weekly CMV PCR while discontinued.  - CMV PCR noted to be positive at 129. .  - CMV PCR 11/8: 105  - CMV PCR 11/15 with 89 copies  - CMV PCR 11/23 95 - increased from previous. Continue to hold Valcyte & monitor pt closely.  - CMV PCR 11/29 pending  - Bactrim for PCP " prophylaxis (MWF).--> holding (10/31) for leukopenia.   - Start Atovaquone 11/6.  - Fluconazole for fungal prophylaxis (first dose 10/6). D/c Fluc 11/16 bc pt had been on it for > 30 days post take back.       Long-term use of immunosuppressant medication    - Prograf: stopped 10/29. Resumed 11/2.  --> Will monitor for signs of toxic side effects, check daily troughs, and change meds accordingly.   - Prednisone: stopped 10/29. Started on Hydrocortisone. pred taper resumed.  - Cellcept: stopped 10/31 for leukopenia. Restarted Cellcept 500 mg bid, 11/6.  - Cellcept held 11/23 2/2 infection         Prophylactic immunotherapy    - See long term use immunosuppresion.       Ascites    - Paracentesis 10/1, 5L removed, no fluid sent for analysis.  - Pt remains volume overloaded.   - Liver US 11/6 with severe ascites.   - Paracentesis 11/7 with 8.9 L removed. Wbc 39, segs 16%. Gram stain - no organisms seen and no WBC. Cultures NGTD.  - Paracentesis 11/12: 8.6 L removed. 34 wbc, 25% segs, 33 lymphs, 42 monocytes/macrophages. Cultures NGTD  - Paracentesis 11/21 with 4 L removed, neg for infection.  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 Appearance and cell count of fluid (51% lymphs) consistent with chyle leak. Triglyceride level pending.      Weakness    - See physical deconditioning.       Physical deconditioning    - PT/OT consulted.  Progressing well w/ therapy.  - Recommend home health PT and rolling walker at discharge (orders placed 11/6).       Hypotension    - d/c'd midodrine 11/28 as patient has not required it recently  - Continue to monitor.     Anemia requiring transfusions    - Fluctuating H&H.    - Transfusions: 10/14; 10/16; 10/18; 10/19; 10/22; 10/27; 10/29, 10/31, 11/3, 11/21, 11/29  - FOBT +  - LDH elevated, Hapto < 10.  - EGD 10/18: ulcerated duodenal mucosa.   - Consulted GI about this ongoing issue and further need of another EGD.  - EGD 11/5 unremarkable.   - Iron studies  11/13:  95, TIBC 107, sat iron 89, transferrin 72, ferritin pending  - Hemolytic anemia workup 11/13: Haptoglobin 132, retic 3.9   - Last transfused 1upRBC 11/21. H/H now improved.   - H/H decreased to 7.6/24. Ordered type and screen, but no transfusion planned for today.   - H/H 7/22.6 11/29 - transfused 1u PRBC with good response.  - Continue to monitor.           Severe malnutrition    - Dietary consulted.   - Temporal and distal extremity muscle wasting and edema.  - Encourage supplements and to increase nutritional intake.  - Per nephrology, pt to be on low protein diet.  - Prealbumin reviewed.   - Tolerating diet.  Denies n/v.    -Told pt it's ok to resume outside protein shakes as renal fx stable, but will monitor closely.  - Will place pt on low fat, high protein diet due to suspicion for chyle leak.         Type 2 diabetes mellitus with diabetic polyneuropathy, with long-term current use of insulin    - Continue home regimen.  - Endocrine consulted.  - Pt off insulin drip at this time.  Apprec endo recs.        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  - Tx w/ Entecavir- dose changed to daily 11/26 due to improved renal fx             VTE Risk Mitigation (From admission, onward)        Ordered     heparin (porcine) injection 5,000 Units  Every 8 hours      11/22/18 1014     IP VTE HIGH RISK PATIENT  Once      11/05/18 1145     Place sequential compression device  Until discontinued      10/05/18 0709          The patients clinical status was discussed at multidisplinary rounds, involving transplant surgery, transplant medicine, pharmacy, nursing, nutrition, and social work    Discharge Planning:  No Patient Care Coordination Note on file.      Elizabeth Dean PA-C  Liver Transplant  [facility]

## 2018-11-30 NOTE — PROGRESS NOTES
"[facility]  Endocrinology  Progress Note    Admit Date: 10/1/2018     Reason for Consult: Management of type 2 DM, Hyperglycemia      Surgical Procedure and Date: Liver transplant 10/5/18, Ex lap 10/6/18     Diabetes diagnosis year:      Home Diabetes Medications:  Lantus 18 units HS and novolog 17 units with meals plus correction scale (150-200 +1)        Lab Results   Component Value Date     HGBA1C 6.8 (H) 2018        How often checking glucose at home? 3-4   BG readings on regimen: 120-150.  Post prandial readings as high as upper 200s  Hypoglycemia on the regimen? yes, none recently   Missed doses on regimen?  No     Diabetes Complications include:     Diabetic peripheral neuropathy      Complicating diabetes co morbidities:   CIRRHOSIS        HPI:  Patient is a 68 y.o. female with a diagnosis of ESLD, listed for liver transplant with meld 23 who underwent live transplant on 10/5/18.  Back to OR on 10/6/18 for ex lap.  Admitted 10/1/18 for hyponatremia s/p paracentesis.  Personal has known diagnosis of diabetes, endocrine consulted for diabetes management.    Post-operative course complicated by ESBL Klebsiella pneumoniae in urine (in contact isolation), peritransplant ascites, worsening renal function (required temporary dialysis), anemia (multiple blood transfusions), and possible GI bleed.     Interval HPI:   Overnight events: Remains in TSU, NAEON.  Prednisone 5 mg daily, standard steroid taper.  BG  elevated this am (301).  Missed prandial insulin administration with dinner yesterday evening.  Eatin%  Nausea: No  Hypoglycemia and intervention: No  Fever: No  TPN and/or TF: No    BP (!) 137/91   Pulse (!) 113   Temp 98.6 °F (37 °C) (Oral)   Resp 20   Ht 5' 3" (1.6 m)   Wt 60.3 kg (132 lb 15 oz) Comment: standing scale  LMP  (LMP Unknown)   SpO2 97%   Breastfeeding? No   BMI 23.55 kg/m²      Labs Reviewed and Include    No results for input(s): GLU, CALCIUM, " ALBUMIN, PROT, NA, K, CO2, CL, BUN, CREATININE, ALKPHOS, ALT, AST, BILITOT in the last 24 hours.  Lab Results   Component Value Date    WBC 2.94 (L) 11/29/2018    HGB 7.0 (L) 11/29/2018    HCT 22.6 (L) 11/29/2018    MCV 91 11/29/2018     (L) 11/29/2018     No results for input(s): TSH, FREET4 in the last 168 hours.  Lab Results   Component Value Date    HGBA1C 6.0 (H) 10/04/2018       Nutritional status:   Body mass index is 23.55 kg/m².  Lab Results   Component Value Date    ALBUMIN 2.9 (L) 11/29/2018    ALBUMIN 2.6 (L) 11/28/2018    ALBUMIN 2.6 (L) 11/27/2018     Lab Results   Component Value Date    PREALBUMIN 15 (L) 11/03/2018    PREALBUMIN 12 (L) 09/17/2018       Estimated Creatinine Clearance: 36.6 mL/min (based on SCr of 1.2 mg/dL).    Accu-Checks  Recent Labs     11/28/18  0027 11/28/18  0846 11/28/18  1227 11/28/18  1730 11/28/18  2212 11/29/18  0834 11/29/18  1404 11/29/18  1724 11/29/18  2210 11/30/18  0827   POCTGLUCOSE 126* 238* 269* 204* 130* 299* 291* 249* 323* 301*       Current Medications and/or Treatments Impacting Glycemic Control  Immunotherapy:    Immunosuppressants         Stop Route Frequency     tacrolimus capsule 1 mg      -- Oral 2 times daily        Steroids:   Hormones (From admission, onward)    Start     Stop Route Frequency Ordered    12/01/18 0900  predniSONE tablet 2.5 mg      12/08 0859 Oral Daily 11/05/18 1004    10/17/18 0945  predniSONE tablet 15 mg      10/24 0859 Oral Daily 10/17/18 0943        Pressors:    Autonomic Drugs (From admission, onward)    Start     Stop Route Frequency Ordered    11/29/18 1300  metoprolol tartrate (LOPRESSOR) split tablet 12.5 mg      -- Oral 2 times daily 11/29/18 1221    10/06/18 1617  rocuronium (ZEMURON) 10 mg/mL injection     Comments:  Created by cabinet override    10/07 0429   10/06/18 1617        Hyperglycemia/Diabetes Medications:   Antihyperglycemics (From admission, onward)    Start     Stop Route Frequency Ordered    11/29/18  1200  insulin detemir U-100 pen 6 Units      -- SubQ Daily 11/29/18 1058    11/28/18 1300  insulin aspart U-100 pen 7-14 Units      -- SubQ 3 times daily with meals 11/28/18 1247    10/14/18 0925  insulin aspart U-100 pen 0-5 Units      -- SubQ Before meals & nightly PRN 10/14/18 0825    10/07/18 1200  insulin regular (Humulin R) 100 Units in sodium chloride 0.9% 100 mL infusion      10/13 2100 IV Continuous 10/07/18 1055          ASSESSMENT and PLAN    * Liver transplanted    Managed per primary team  Avoid hypoglycemia    Recent Labs   Lab 11/30/18  0825   ALT 10   AST 19   ALKPHOS 128   BILITOT 1.5*   PROT 5.2*   ALBUMIN 2.9*            Type 2 diabetes mellitus with diabetic polyneuropathy, with long-term current use of insulin    BG goal 140-180    Increase Levemir to 8 units daily  Novolog mealtime insulin dose to 7-14.  Administer    7    units subcutaneously with meals if patient eats < 50% or low carb meal; administer    14    units if patient eats >/= to 50% or high carb meal  Low dose correction scale given kidney function.  BG monitoring AC/HS    Start self/caregiver administration of insulin to review for home.     Discharge planning:  TBD     Severe malnutrition    Oral intake encouraged, may affect BG readings     UTI (urinary tract infection)    Infection may increase insulin resistance.          Prophylactic immunotherapy    May increase insulin resistance.        Alteration in skin integrity    Infection may increase insulin resistance.   Wound vac  Optimize BG control.        EVA (acute kidney injury)    Avoid insulin stacking and hypoglycemia.  Lab Results   Component Value Date    CREATININE 1.2 11/30/2018              Be Willams NP  Endocrinology  [facility]

## 2018-11-30 NOTE — ASSESSMENT & PLAN NOTE
- Continue PO Mag Oxide  - IV Mag 2g 11/29 for Mag 1.3 + 2g 11/30 for Mag 1.5  - Continue to monitor.

## 2018-11-30 NOTE — ASSESSMENT & PLAN NOTE
- Valcyte for CMV prophylaxis--> holding (10/31) for leukopenia. Pt will need weekly CMV PCR while discontinued.  - CMV PCR noted to be positive at 129. .  - CMV PCR 11/8: 105  - CMV PCR 11/15 with 89 copies  - CMV PCR 11/23 95 - increased from previous. Continue to hold Valcyte & monitor pt closely.  - CMV PCR 11/29 pending  - Bactrim for PCP prophylaxis ([facility]).--> holding (10/31) for leukopenia.   - Start Atovaquone 11/6.  - Fluconazole for fungal prophylaxis (first dose 10/6). D/c Fluc 11/16 bc pt had been on it for > 30 days post take back.

## 2018-11-30 NOTE — PLAN OF CARE
Problem: Patient Care Overview  Goal: Plan of Care Review  Outcome: Ongoing (interventions implemented as appropriate)  Pt AAOx4, VSS, in bed with upper siderails raised x2, bed in lowest/locked position, call light/personal belongings within reach. Pt instructed to call for assistance. Pt verbalizes understanding. Pt afebrile at this time.  Proper hand hygiene performed before and after pt care.      Wound vac to continuous suction @ 125. Milky, white output noted. Wound cx (+) for ESBL. WC MWF. See flowsheet for output amount..  Pt to IR (11/29) for grenade drain placement to R side of abdomen. Per orders, recording output q4h. Milky, white output noted. Awaiting cx results, possible Chyle leak. See flowsheet for output amount.  Held PM Metoprolol - per day RN, afternoon dose given at 1835. Pt tachy, HR in low 100's.   Contact precautions maintained for ESBL in urine. Finished round of Ertapenem (11/29).  1u PRBC administered (11/29). Will recheck H&H with AM labs.  Holding cellcept d/t infection. WBC 2.94.  LFTs stable and Cr stable.   PRN Tylenol administered x1 for pain in drain placement site.   Monitoring BG ACHS. Bedtime bg 323, 2u insulin administered.  UOP measured via hat, no bm so far this shift.  Self meds encouraged, but not pulled.   at bedside attentive to needs.    Will continue to monitor patient.

## 2018-11-30 NOTE — ASSESSMENT & PLAN NOTE
- No plans for RRT in near future, per nephrology  - Trialysis line removed 11/13  - Nephrology involved. Appreciate recs.  - Cr improved & stable.  - Continue to monitor.

## 2018-11-30 NOTE — ASSESSMENT & PLAN NOTE
Managed per primary team  Avoid hypoglycemia    Recent Labs   Lab 11/30/18  0825   ALT 10   AST 19   ALKPHOS 128   BILITOT 1.5*   PROT 5.2*   ALBUMIN 2.9*

## 2018-11-30 NOTE — PT/OT/SLP PROGRESS
Physical Therapy Treatment    Patient Name:  Scarlett Reddy   MRN:  [id number]    Recommendations:     Discharge Recommendations:  home health PT, home health OT   Discharge Equipment Recommendations: walker, rolling   Barriers to discharge: Inaccessible home and Decreased caregiver support    Assessment:     Scarlett Reddy is a 68 y.o. female admitted with a medical diagnosis of Liver transplanted.  She presents with the following impairments/functional limitations:  weakness, impaired functional mobilty, impaired balance, gait instability, impaired self care skills, impaired endurance .Limited progression with OOB mobility secondary to patient declined.  would only agree to supine exercises. Noted fair strength would B LE's.  She would benefit from further IP therapy.    Rehab Prognosis:  Good; patient would benefit from acute skilled PT services to address these deficits and reach maximum level of function.      Recent Surgery: Procedure(s) (LRB):  ANGIOGRAM-HEPATIC (Left) 11 Days Post-Op    Plan:     During this hospitalization, patient to be seen 4 x/week to address the above listed problems via gait training, therapeutic activities, therapeutic exercises, neuromuscular re-education  · Plan of Care Expires:  12/15/18   Plan of Care Reviewed with: patient, spouse    Subjective     Communicated with NSG prior to session.  Patient found supine upon PT entry to room, agreeable to treatment.      Chief Complaint: Fatigue  Patient comments/goals: Communication done through the . Mrs. Reddy stated she had a long day so far and still required wound care. Mrs. Reddy stated she had enough after performing supine exercises. She stated she they had taken off the wound vac and didn't want to go to the chair and have to come back.   Pain/Comfort:  · Pain Rating 1: 0/10    Patients cultural, spiritual, Taoist conflicts given the current situation: none    Objective:     Patient found with: telemetry,  pulse ox (continuous), peripheral IV(wound vac was off )     General Precautions: Standard, fall, contact   Orthopedic Precautions:N/A   Braces:       Functional Mobility:  Patient declined      AM-PAC 6 CLICK MOBILITY  Turning over in bed (including adjusting bedclothes, sheets and blankets)?: 3  Sitting down on and standing up from a chair with arms (e.g., wheelchair, bedside commode, etc.): 3  Moving from lying on back to sitting on the side of the bed?: 2  Moving to and from a bed to a chair (including a wheelchair)?: 3  Need to walk in hospital room?: 3  Climbing 3-5 steps with a railing?: 2  Basic Mobility Total Score: 16       Therapeutic Activities and Exercises:   Mrs. Reddy performed AP,QS,HS, hip ABD/ADD and SAQ x15-20 reps B LANDRY's  Educated patient through  on benefits of exercises and OOB mobility.  Mrs. Reddy stated she would get up after the were finished with her wound care.    Patient left supine with all lines intact, call button in reach and family and  present..    GOALS:   Multidisciplinary Problems     Physical Therapy Goals        Problem: Physical Therapy Goal    Goal Priority Disciplines Outcome Goal Variances Interventions   Physical Therapy Goal     PT, PT/OT Ongoing (interventions implemented as appropriate)     Description:  Goals to be met by: 2018    Patient will increase functional independence with mobility by performin. Supine to sit with Contact Guard Assistance - not met  2. Sit to stand transfer with Supervision using LRAD or no AD - not met  3. Bed to chair transfer with Supervision using LRAD or no AD - not met  4. Gait  x 250 feet with Supervision using LRAD or no AD - not met  5. Stand for 3 minutes with Supervision to increase activity tolerance - not met   6. Lower extremity exercise program x15 reps per handout, with independence - not met                            Time Tracking:     PT Received On: 18  PT Start Time: 1330     PT  Stop Time: 1345  PT Total Time (min): 15 min     Billable Minutes: Therapeutic Exercise 15    Treatment Type: Treatment  PT/PTA: PTA     PTA Visit Number: 1     Sam Garvey II, PTA  11/30/2018

## 2018-11-30 NOTE — ASSESSMENT & PLAN NOTE
Avoid insulin stacking and hypoglycemia.  Lab Results   Component Value Date    CREATININE 1.2 11/30/2018

## 2018-11-30 NOTE — ASSESSMENT & PLAN NOTE
- Fluctuating H&H.    - Transfusions: 10/14; 10/16; 10/18; 10/19; 10/22; 10/27; 10/29, 10/31, 11/3, 11/21, 11/29  - FOBT +  - LDH elevated, Hapto < 10.  - EGD 10/18: ulcerated duodenal mucosa.   - Consulted GI about this ongoing issue and further need of another EGD.  - EGD 11/5 unremarkable.   - Iron studies 11/13:  95, TIBC 107, sat iron 89, transferrin 72, ferritin pending  - Hemolytic anemia workup 11/13: Haptoglobin 132, retic 3.9   - Last transfused 1upRBC 11/21. H/H now improved.   - H/H decreased to 7.6/24. Ordered type and screen, but no transfusion planned for today.   - H/H 7/22.6 11/29 - transfused 1u PRBC with good response.  - Continue to monitor.

## 2018-11-30 NOTE — SUBJECTIVE & OBJECTIVE
"Interval HPI:   Overnight events: Remains in TSU, NAEON.  Prednisone 5 mg daily, standard steroid taper.  BG elevated this am (301).  Missed prandial insulin administration with dinner yesterday evening.  Eatin%  Nausea: No  Hypoglycemia and intervention: No  Fever: No  TPN and/or TF: No    BP (!) 137/91   Pulse (!) 113   Temp 98.6 °F (37 °C) (Oral)   Resp 20   Ht 5' 3" (1.6 m)   Wt 60.3 kg (132 lb 15 oz) Comment: standing scale  LMP  (LMP Unknown)   SpO2 97%   Breastfeeding? No   BMI 23.55 kg/m²     Labs Reviewed and Include    No results for input(s): GLU, CALCIUM, ALBUMIN, PROT, NA, K, CO2, CL, BUN, CREATININE, ALKPHOS, ALT, AST, BILITOT in the last 24 hours.  Lab Results   Component Value Date    WBC 2.94 (L) 2018    HGB 7.0 (L) 2018    HCT 22.6 (L) 2018    MCV 91 2018     (L) 2018     No results for input(s): TSH, FREET4 in the last 168 hours.  Lab Results   Component Value Date    HGBA1C 6.0 (H) 10/04/2018       Nutritional status:   Body mass index is 23.55 kg/m².  Lab Results   Component Value Date    ALBUMIN 2.9 (L) 2018    ALBUMIN 2.6 (L) 2018    ALBUMIN 2.6 (L) 2018     Lab Results   Component Value Date    PREALBUMIN 15 (L) 2018    PREALBUMIN 12 (L) 2018       Estimated Creatinine Clearance: 36.6 mL/min (based on SCr of 1.2 mg/dL).    Accu-Checks  Recent Labs     18  0027 18  0846 18  1227 18  1730 18  2212 18  0834 18  1404 18  1724 18  2210 18  0827   POCTGLUCOSE 126* 238* 269* 204* 130* 299* 291* 249* 323* 301*       Current Medications and/or Treatments Impacting Glycemic Control  Immunotherapy:    Immunosuppressants         Stop Route Frequency     tacrolimus capsule 1 mg      -- Oral 2 times daily        Steroids:   Hormones (From admission, onward)    Start     Stop Route Frequency Ordered    18 0900  predniSONE tablet 2.5 mg       0859 Oral " Daily 11/05/18 1004    10/17/18 0945  predniSONE tablet 15 mg      10/24 0859 Oral Daily 10/17/18 0943        Pressors:    Autonomic Drugs (From admission, onward)    Start     Stop Route Frequency Ordered    11/29/18 1300  metoprolol tartrate (LOPRESSOR) split tablet 12.5 mg      -- Oral 2 times daily 11/29/18 1221    10/06/18 1617  rocuronium (ZEMURON) 10 mg/mL injection     Comments:  Created by cabinet override    10/07 0429   10/06/18 1617        Hyperglycemia/Diabetes Medications:   Antihyperglycemics (From admission, onward)    Start     Stop Route Frequency Ordered    11/29/18 1200  insulin detemir U-100 pen 6 Units      -- SubQ Daily 11/29/18 1058    11/28/18 1300  insulin aspart U-100 pen 7-14 Units      -- SubQ 3 times daily with meals 11/28/18 1247    10/14/18 0925  insulin aspart U-100 pen 0-5 Units      -- SubQ Before meals & nightly PRN 10/14/18 0825    10/07/18 1200  insulin regular (Humulin R) 100 Units in sodium chloride 0.9% 100 mL infusion      10/13 2100 IV Continuous 10/07/18 1055

## 2018-11-30 NOTE — ASSESSMENT & PLAN NOTE
- CRRT 10/14, 10/17, 10/20, 10/31.  - CXR 10/30: abundant fluid in b/l pleural space.  - Continue 40 mg Lasix BID.  - Monitor daily weights.

## 2018-11-30 NOTE — PLAN OF CARE
Problem: Physical Therapy Goal  Goal: Physical Therapy Goal  Goals to be met by: 2018    Patient will increase functional independence with mobility by performin. Supine to sit with Contact Guard Assistance - not met  2. Sit to stand transfer with Supervision using LRAD or no AD - not met  3. Bed to chair transfer with Supervision using LRAD or no AD - not met  4. Gait  x 250 feet with Supervision using LRAD or no AD - not met  5. Stand for 3 minutes with Supervision to increase activity tolerance - not met   6. Lower extremity exercise program x15 reps per handout, with independence - not met           Patient goals remain appropriate.  Patient will continue to benefit from further PT services.  Sam Garvey, PTA

## 2018-11-30 NOTE — CONSULTS
Consult received. Full note to follow.    Please call for questions.    Thanks,  Lisa Benson MD  Infectious Disease Fellow, PGY-5  Pager: [phone] or ext: [phone]  [facility]

## 2018-11-30 NOTE — ASSESSMENT & PLAN NOTE
- Pt w/ tachycardia 2-3 days, improved today, but still present.  - Started Metoprolol 12.5 mg BID 11/29 for rate control. HR improved, but still elevated. BP stable.  - 11/30 -> increased Metoprolol to 25 mg BID  - Monitor closely.

## 2018-11-30 NOTE — ASSESSMENT & PLAN NOTE
- PT/OT consulted.  Progressing well w/ therapy.  - Recommend home health PT and rolling walker at discharge (orders placed 11/6).

## 2018-11-30 NOTE — ASSESSMENT & PLAN NOTE
- Paracentesis 10/1, 5L removed, no fluid sent for analysis.  - Pt remains volume overloaded.   - Liver US 11/6 with severe ascites.   - Paracentesis 11/7 with 8.9 L removed. Wbc 39, segs 16%. Gram stain - no organisms seen and no WBC. Cultures NGTD.  - Paracentesis 11/12: 8.6 L removed. 34 wbc, 25% segs, 33 lymphs, 42 monocytes/macrophages. Cultures NGTD  - Paracentesis 11/21 with 4 L removed, neg for infection.  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 Appearance and cell count of fluid (51% lymphs) consistent with chyle leak. Triglyceride level pending.

## 2018-11-30 NOTE — PROGRESS NOTES
Wound care follow up:  Pt presents with wet to dry dressing in place to abdomen wounds x2 along dehisced incision as nurse reports MD team removed vac dressing earlier today.  Reapplied vac dressings using white foam to base covering with black foam with bridge between the 2 wounds.  Seal obtained at 125mm/Hg continuous setting.  Pt tolerated dressing application well. [id number]       11/30/18 1443       Negative Pressure Wound Therapy    Placement Date/Time: 11/24/18 1000   Orientation: transverse  Location: Abdomen   NPWT Type Vacuum Therapy   Therapy Setting NPWT Continuous therapy   Pressure Setting NPWT 125 mmHg   Therapy Interventions NPWT Dressing changed   Sponges Inserted NPWT Black;White;1

## 2018-11-30 NOTE — ASSESSMENT & PLAN NOTE
- Middle chevron incision opened 11/16 with purulent drainage  - Packed with Aquacel ag at this time. Growing Klebsiella p.   - CT abd/pelvis 11/17- no signs of infection  - 11/17: R side of chevron incision w/ 3 small holes draining purulent fluid, opened and packed w/ aquacel.   - Wound care following.  - Wound w/ increased drainage, so wound care placed vac 11/24/18.  Apprec recs. Extending Ertapenem thru 11/28 per ID.    - Abdomen erythematous and indurated RLQ inferior to chevron.  - CT abd/pelvis 11/26 - no sign of infection  - Last wound vac change 11/30.    .

## 2018-11-30 NOTE — ASSESSMENT & PLAN NOTE
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   - POD 1 US: increased arterial resistive indices, suggestive of edema vs acute rejection vs congestion.  - Repeat US 10/9 with continued elevation of RIs.  - LFTs stable.  - T bili trending down.   - Repeat liver US (10/11): elevated RIs.  - Liver US 11/6 to assess for ascites. No arterial flow within the liver allograft concerning for interval thrombosis/occlusion of the arterial system. Severe ascites seen.  - Liver US 11/8: arterial flow seen w/ elevated RIs   - LFTs remain stable. No CTA at this time due to EVA and normal liver function.   - Liver US 11/15 showed "abnormally decreased main hepatic artery peak systolic velocity with slow arterial flow and abnormal tardus parvus waveforms intrahepatically."   -Vascular surgery consulted  - Liver US 11/19 showed no arterial flow on the right and sluggish flow on the left.   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 No further intervention planned at this time as patient's LFTs are stable. Will monitor.  - CT abd/pelvis 11/26 showed: 1. Persistent large volume of ascites. 2. Large right and moderate left pleural effusions with adjacent basilar atelectasis. 3. Anasarca  - IR placed perc drain 11/29 due to suspicion for chyle leak. 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  Fluid also growing gram positive cocci in chains, resembling strep. Final cx pending. Vanc started. ID consulted.       "

## 2018-12-01 PROBLEM — I89.8 CHYLOUS ASCITES: Status: ACTIVE | Noted: 2018-08-30

## 2018-12-01 LAB
ALBUMIN SERPL BCP-MCNC: 2.6 G/DL
ALP SERPL-CCNC: 130 U/L
ALT SERPL W/O P-5'-P-CCNC: 9 U/L
ANION GAP SERPL CALC-SCNC: 11 MMOL/L
AST SERPL-CCNC: 17 U/L
BASOPHILS # BLD AUTO: 0.01 K/UL
BASOPHILS NFR BLD: 0.3 %
BILIRUB SERPL-MCNC: 1.2 MG/DL
BUN SERPL-MCNC: 61 MG/DL
CALCIUM SERPL-MCNC: 8.7 MG/DL
CHLORIDE SERPL-SCNC: 98 MMOL/L
CMV DNA SERPL NAA+PROBE-ACNC: 146 IU/ML
CO2 SERPL-SCNC: 30 MMOL/L
CREAT SERPL-MCNC: 1.1 MG/DL
DIFFERENTIAL METHOD: ABNORMAL
EOSINOPHIL # BLD AUTO: 0 K/UL
EOSINOPHIL NFR BLD: 1.4 %
ERYTHROCYTE [DISTWIDTH] IN BLOOD BY AUTOMATED COUNT: 16.2 %
EST. GFR  (AFRICAN AMERICAN): 59.2 ML/MIN/1.73 M^2
EST. GFR  (NON AFRICAN AMERICAN): 51.3 ML/MIN/1.73 M^2
GLUCOSE SERPL-MCNC: 250 MG/DL
HCT VFR BLD AUTO: 27.2 %
HGB BLD-MCNC: 8.6 G/DL
IMM GRANULOCYTES # BLD AUTO: 0.12 K/UL
IMM GRANULOCYTES NFR BLD AUTO: 4.1 %
LYMPHOCYTES # BLD AUTO: 0.3 K/UL
LYMPHOCYTES NFR BLD: 9.9 %
MAGNESIUM SERPL-MCNC: 1.6 MG/DL
MCH RBC QN AUTO: 28.5 PG
MCHC RBC AUTO-ENTMCNC: 31.6 G/DL
MCV RBC AUTO: 90 FL
MONOCYTES # BLD AUTO: 0.4 K/UL
MONOCYTES NFR BLD: 11.9 %
NEUTROPHILS # BLD AUTO: 2.1 K/UL
NEUTROPHILS NFR BLD: 72.4 %
NRBC BLD-RTO: 0 /100 WBC
PHOSPHATE SERPL-MCNC: 3.6 MG/DL
PLATELET # BLD AUTO: 118 K/UL
PMV BLD AUTO: 10 FL
POCT GLUCOSE: 129 MG/DL (ref 70–110)
POCT GLUCOSE: 136 MG/DL (ref 70–110)
POCT GLUCOSE: 269 MG/DL (ref 70–110)
POCT GLUCOSE: 313 MG/DL (ref 70–110)
POTASSIUM SERPL-SCNC: 3.9 MMOL/L
PROT SERPL-MCNC: 4.9 G/DL
RBC # BLD AUTO: 3.02 M/UL
SODIUM SERPL-SCNC: 139 MMOL/L
TACROLIMUS BLD-MCNC: 4.5 NG/ML
WBC # BLD AUTO: 2.94 K/UL

## 2018-12-01 PROCEDURE — 25000003 PHARM REV CODE 250: Performed by: PHYSICIAN ASSISTANT

## 2018-12-01 PROCEDURE — 94664 DEMO&/EVAL PT USE INHALER: CPT

## 2018-12-01 PROCEDURE — 63600175 PHARM REV CODE 636 W HCPCS: Performed by: PHYSICIAN ASSISTANT

## 2018-12-01 PROCEDURE — 25000003 PHARM REV CODE 250: Performed by: NURSE PRACTITIONER

## 2018-12-01 PROCEDURE — 94761 N-INVAS EAR/PLS OXIMETRY MLT: CPT

## 2018-12-01 PROCEDURE — 63600175 PHARM REV CODE 636 W HCPCS: Performed by: NURSE PRACTITIONER

## 2018-12-01 PROCEDURE — 99232 SBSQ HOSP IP/OBS MODERATE 35: CPT | Mod: ,,, | Performed by: NURSE PRACTITIONER

## 2018-12-01 PROCEDURE — 20600001 HC STEP DOWN PRIVATE ROOM

## 2018-12-01 PROCEDURE — 36415 COLL VENOUS BLD VENIPUNCTURE: CPT

## 2018-12-01 PROCEDURE — 85025 COMPLETE CBC W/AUTO DIFF WBC: CPT

## 2018-12-01 PROCEDURE — 99233 SBSQ HOSP IP/OBS HIGH 50: CPT | Mod: 24,,, | Performed by: NURSE PRACTITIONER

## 2018-12-01 PROCEDURE — 84100 ASSAY OF PHOSPHORUS: CPT

## 2018-12-01 PROCEDURE — 25000003 PHARM REV CODE 250: Performed by: TRANSPLANT SURGERY

## 2018-12-01 PROCEDURE — 83735 ASSAY OF MAGNESIUM: CPT

## 2018-12-01 PROCEDURE — 80197 ASSAY OF TACROLIMUS: CPT

## 2018-12-01 PROCEDURE — 80053 COMPREHEN METABOLIC PANEL: CPT

## 2018-12-01 PROCEDURE — 99900035 HC TECH TIME PER 15 MIN (STAT)

## 2018-12-01 RX ORDER — INSULIN ASPART 100 [IU]/ML
1-10 INJECTION, SOLUTION INTRAVENOUS; SUBCUTANEOUS
Status: DISCONTINUED | OUTPATIENT
Start: 2018-12-01 | End: 2018-12-03

## 2018-12-01 RX ADMIN — TACROLIMUS 1 MG: 1 CAPSULE ORAL at 06:12

## 2018-12-01 RX ADMIN — MAGNESIUM OXIDE TAB 400 MG (241.3 MG ELEMENTAL MG) 800 MG: 400 (241.3 MG) TAB at 08:12

## 2018-12-01 RX ADMIN — DIBASIC SODIUM PHOSPHATE, MONOBASIC POTASSIUM PHOSPHATE AND MONOBASIC SODIUM PHOSPHATE 2 TABLET: 852; 155; 130 TABLET ORAL at 08:12

## 2018-12-01 RX ADMIN — INSULIN ASPART 4 UNITS: 100 INJECTION, SOLUTION INTRAVENOUS; SUBCUTANEOUS at 12:12

## 2018-12-01 RX ADMIN — ASPIRIN 81 MG CHEWABLE TABLET 81 MG: 81 TABLET CHEWABLE at 08:12

## 2018-12-01 RX ADMIN — FUROSEMIDE 40 MG: 40 TABLET ORAL at 06:12

## 2018-12-01 RX ADMIN — PREDNISONE 2.5 MG: 2.5 TABLET ORAL at 08:12

## 2018-12-01 RX ADMIN — INSULIN ASPART 7 UNITS: 100 INJECTION, SOLUTION INTRAVENOUS; SUBCUTANEOUS at 06:12

## 2018-12-01 RX ADMIN — INSULIN ASPART 3 UNITS: 100 INJECTION, SOLUTION INTRAVENOUS; SUBCUTANEOUS at 08:12

## 2018-12-01 RX ADMIN — PANTOPRAZOLE SODIUM 40 MG: 40 TABLET, DELAYED RELEASE ORAL at 08:12

## 2018-12-01 RX ADMIN — INSULIN ASPART 14 UNITS: 100 INJECTION, SOLUTION INTRAVENOUS; SUBCUTANEOUS at 12:12

## 2018-12-01 RX ADMIN — ATOVAQUONE 1500 MG: 750 SUSPENSION ORAL at 08:12

## 2018-12-01 RX ADMIN — VANCOMYCIN HYDROCHLORIDE 750 MG: 750 INJECTION, POWDER, LYOPHILIZED, FOR SOLUTION INTRAVENOUS at 02:12

## 2018-12-01 RX ADMIN — METOPROLOL TARTRATE 25 MG: 25 TABLET ORAL at 08:12

## 2018-12-01 RX ADMIN — INSULIN ASPART 14 UNITS: 100 INJECTION, SOLUTION INTRAVENOUS; SUBCUTANEOUS at 08:12

## 2018-12-01 RX ADMIN — ENTECAVIR 0.5 MG: 0.5 TABLET ORAL at 08:12

## 2018-12-01 RX ADMIN — FUROSEMIDE 40 MG: 40 TABLET ORAL at 08:12

## 2018-12-01 RX ADMIN — HEPARIN SODIUM 5000 UNITS: 5000 INJECTION, SOLUTION INTRAVENOUS; SUBCUTANEOUS at 08:12

## 2018-12-01 RX ADMIN — TACROLIMUS 1 MG: 1 CAPSULE ORAL at 08:12

## 2018-12-01 RX ADMIN — HEPARIN SODIUM 5000 UNITS: 5000 INJECTION, SOLUTION INTRAVENOUS; SUBCUTANEOUS at 06:12

## 2018-12-01 NOTE — PROGRESS NOTES
[facility]  Liver Transplant  Progress Note    Patient Name: Scarlett Reddy  MRN: [id number]  Admission Date: 10/1/2018  Hospital Length of Stay: 60 days  Code Status: Full Code  Primary Care Provider: Primary Doctor No  Post-Operative Day: 57    ORGAN:   LIVER  Disease Etiology: Cirrhosis: Type B and D  Donor Type:    - Brain Death  CDC High Risk:   No  Donor CMV Status:   Donor CMV Status: Positive  Donor HBcAB:   Negative  Donor HCV Status:   Negative  Donor HBV SETH: Negative  Donor HCV SETH: Negative  Whole or Partial: Whole Liver  Biliary Anastomosis: End to End  Arterial Anatomy: Replaced Left Hepatic and Right Hepatic  Subjective:     History of Present Illness:  Ms. Reddy is a 67 y/o female with PMH of ESLD secondary Hep B and D.  Listed for liver transplant with MELD 23.  Paracentesis 10/1 with 5L removed, no fluid sent for analysis.  Morning labs significant for hyponatremia, Na 119.  Pt admitted to LTS for sodium monitoring.        Hospital Course:  Hospital Course: 67 y/o F h/o HBV/delta cirrhosis s/p DBDLT 10/5/18 (CMV D+/R+, steroid induction, MMF/tacro/pred maintenance) with take back on 10/6 2/2 hyperbilirubinema and bilious VALORIE drainage, no leak identified. Post-op course c/b hypercapneic and hypoxic respiratory failure and was reintubated though quickly extubated now doing well. Liver bx (10/6) sig for Zone 3 hemorrhage and collapse, in addition to cholestasis. Transferred from ICU on POD #4. ID consulted to comment on positive diphtheroid culture from ascitic fluid (likely skin colonization) as well as ESBL Klebsiella pneumoniae in urine culture (10/4).  Pt asymptomatic and cell count from ascitic fluid unremarkable. Per ID recs, no need to treat diphtheroids or asymptomatic ESBL Kleb pneumo bacteriuria though pt has had 6 days of therapy which would cover these organisms. Mild peritransplant ascites- repeat US 10/6 with increased arterial resistive indices. Repeat US 10/9  w/ continued elevation in RIs and fluid collections. LFTs trending down nicely.     Acute gradual worsening of renal function s/p OLTx. Creatinine on arrival 0.8 and has been up trending. Nephrology consulted for post-op EVA. Cr cont to trend up but remains to have good UOP. Had stable response to lasiz diuresis.  BUN elevated requiring multiple rounds of dialysis (10/15, 10/17, 10/20, 10/31). No significant improvements in kidney function noted.   In addition, pt H/H cont to fluctuate requiring multiple blood transfusions (10/14, 10/16, 10/18, 10/19, 10/22, 10/27, 10/29).  Pt reported dark stools 10/15 but color has normalized. FOBT+. EGD 10/18 consistent with ulcerated mucosa in duodenum s/p coagulation. Concern for H. Pylori. Started Amoxicillin/levofloxacin (10/19, complete 11/2)). Remains on Protonix 40 mg BID. Biopsy negative for infection and CMV. Will cont close monitoring H/H, transfuse as needed with goal Hb > 7.0. Of note, Urine cx + Klebsiella pneumoniae (10/13). Received 3 day course of ciprofloxacin, dc'd 10/19.   On 10/19 pm pt c/o crushing chest pain. Troponin 0.094, likely 2/2 ischemic stress. EKG NSR.   Liver US 11/6 showed no arterial flow. Paracentesis 11/7 with 8.9 L removed. Wbc 39, segs 16%. Gram stain no organisms seen + no WBC. Pt feels better since having fluid removed but she continues to have worsening abdominal distention & LE edema.Repeat Liver US 11/8 showed arterial flow with increased RIs. Paracentesis 11/12 with 8.6L removed (WBC 34, segs 25%). 11/15 liver u/s showed abnormally decreased main hepatic artery peak systolic velocity with slow arterial flow and abnormal tardus parvus waveforms intrahepatically. Consulted vascular surgery. Repeat liver US 11/19 showed no arterial flow on R and sluggish flow on L. Hepatic angiogram 11/19 confirmed HAT.  Friday 11/16, purulent drainage noted from middle of chevron incision. Area opened and packed. Cx grew klebsiella p. CT scan showed no  signs of infection. On Monday 11/18, it was noted that the right/lateral side of the chevron had 3 small openings draining purulent fluid. Opened the area and packed. Vac placed on 11/24. Wound care following.    Interval history:  No acute events overnight. Pt reports sleeping well overnight and feels well this am. Will cont with low fat diet in order to possibly improve chylous leak. Drain with increased output overnight. Some mild back noted but relief obtained with tylenol. Continue Lasix 40 mg BID. Patient finished day 7 of 7 of Ertapenem for kleb pneumo ESBL UTI. Of note, pt grew same bacteria from wound 11/16. Peritoneal fluid 11/29 growing gram positive cocci in chains, possible strep. Final cx pending. Vanc started. ID consulted. She is tolerating diet.  Denies diarrhea. Ambulating with walker.     Scheduled Meds:   aspirin  81 mg Oral Daily    atovaquone  1,500 mg Oral Daily    entecavir  0.5 mg Oral Daily    furosemide  40 mg Oral BID    heparin (porcine)  5,000 Units Subcutaneous Q8H    insulin aspart U-100  7-14 Units Subcutaneous TIDWM    insulin detemir U-100  8 Units Subcutaneous Daily    k phos di & mono-sod phos mono  2 tablet Oral BID    magnesium oxide  800 mg Oral BID    metoprolol tartrate  25 mg Oral BID    pantoprazole  40 mg Oral BID    predniSONE  2.5 mg Oral Daily    tacrolimus  1 mg Oral BID    vancomycin (VANCOCIN) IVPB  750 mg Intravenous Q24H     Continuous Infusions:  PRN Meds:sodium chloride, acetaminophen, bisacodyl, calcium carbonate, dextrose 50%, dextrose 50%, glucagon (human recombinant), glucose, glucose, insulin aspart U-100, omnipaque, ondansetron, ondansetron, polyethylene glycol, sodium chloride 0.9%, traMADol    Review of Systems   Constitutional: Positive for activity change and appetite change (improving). Negative for chills, fatigue and fever.   HENT: Negative for congestion, facial swelling and trouble swallowing.    Eyes: Negative for pain, discharge  and visual disturbance.   Respiratory: Negative for cough, chest tightness, shortness of breath and wheezing.    Cardiovascular: Positive for leg swelling. Negative for chest pain and palpitations.   Gastrointestinal: Positive for abdominal distention and abdominal pain. Negative for constipation, diarrhea, nausea and vomiting.   Endocrine: Negative.    Genitourinary: Negative for decreased urine volume, difficulty urinating and dysuria.   Musculoskeletal: Positive for back pain and neck pain. Negative for arthralgias and neck stiffness.   Skin: Positive for wound.   Allergic/Immunologic: Positive for immunocompromised state.   Neurological: Positive for weakness. Negative for tremors and headaches.   Psychiatric/Behavioral: Negative for confusion and sleep disturbance.     Objective:     Vital Signs (Most Recent):  Temp: 98.1 °F (36.7 °C) (11/30/18 2030)  Pulse: (!) 125 (12/01/18 0807)  Resp: (!) 21 (12/01/18 0807)  BP: 127/78 (12/01/18 0807)  SpO2: 95 % (12/01/18 0807) Vital Signs (24h Range):  Temp:  [97.7 °F (36.5 °C)-98.9 °F (37.2 °C)] 98.1 °F (36.7 °C)  Pulse:  [101-125] 125  Resp:  [15-23] 21  SpO2:  [94 %-95 %] 95 %  BP: (116-142)/(71-93) 127/78     Weight: 51.9 kg (114 lb 8.5 oz)(standing)  Body mass index is 20.29 kg/m².    Intake/Output - Last 3 Shifts       11/29 0700 - 11/30 0659 11/30 0700 - 12/01 0659 12/01 0700 - 12/02 0659    P.O. 940 1180     I.V. (mL/kg) 50 (0.8) 50 (1)     Blood 212.5      IV Piggyback 100 250     Total Intake(mL/kg) 1302.5 (21.6) 1480 (28.5)     Urine (mL/kg/hr) 1100 (0.8) 1300 (1)     Emesis/NG output  0     Drains 1180 440 140    Other 250 220     Stool 0 0     Blood  0     Total Output 2530 1960 140    Net -1227.5 -480 -140           Urine Occurrence 1 x 5 x     Stool Occurrence 0 x 1 x     Emesis Occurrence  0 x           Physical Exam   Constitutional: She is oriented to person, place, and time. She appears well-developed. No distress.   Temporal and distal extremity muscle  wasting   HENT:   Head: Normocephalic and atraumatic.   Eyes: EOM are normal. No scleral icterus.   Neck: Normal range of motion.   Cardiovascular: Normal rate, regular rhythm and normal heart sounds.   No murmur heard.  Pulmonary/Chest: Effort normal. No respiratory distress. She has no wheezes.   Breath sounds decreased at bases   Abdominal: Soft. Bowel sounds are normal. She exhibits distension. There is tenderness. There is no guarding.   Wound vac placed to middle chevron incision + R lateral side, no s/s/i  Erythema + induration RLQ, improving  RLQ VALORIE drain sites c/d/i with sutures   Musculoskeletal: Normal range of motion. She exhibits edema (3+ BLE, + generalized).   Neurological: She is alert and oriented to person, place, and time.   Skin: Skin is warm. She is not diaphoretic.   Psychiatric: She has a normal mood and affect.   Nursing note and vitals reviewed.      Laboratory:  Immunosuppressants         Stop Route Frequency     tacrolimus capsule 1 mg      -- Oral 2 times daily        CBC:   Recent Labs   Lab 11/30/18  0826   WBC 3.07*   RBC 3.11*   HGB 9.0*   HCT 28.2*   *   MCV 91   MCH 28.9   MCHC 31.9*     CMP:   Recent Labs   Lab 12/01/18  0731   *   CALCIUM 8.7   ALBUMIN 2.6*   PROT 4.9*      K 3.9   CO2 30*   CL 98   BUN 61*   CREATININE 1.1   ALKPHOS 130   ALT 9*   AST 17   BILITOT 1.2*     Labs within the past 24 hours have been reviewed.    Diagnostic Results:  I have personally reviewed all pertinent imaging studies.Scheduled Meds:   aspirin  81 mg Oral Daily    atovaquone  1,500 mg Oral Daily    entecavir  0.5 mg Oral Daily    furosemide  40 mg Oral BID    heparin (porcine)  5,000 Units Subcutaneous Q8H    insulin aspart U-100  7-14 Units Subcutaneous TIDWM    insulin detemir U-100  8 Units Subcutaneous Daily    k phos di & mono-sod phos mono  2 tablet Oral BID    magnesium oxide  800 mg Oral BID    metoprolol tartrate  25 mg Oral BID    pantoprazole  40 mg Oral  BID    predniSONE  2.5 mg Oral Daily    tacrolimus  1 mg Oral BID    vancomycin (VANCOCIN) IVPB  750 mg Intravenous Q24H     Continuous Infusions:  PRN Meds:sodium chloride, acetaminophen, bisacodyl, calcium carbonate, dextrose 50%, dextrose 50%, glucagon (human recombinant), glucose, glucose, insulin aspart U-100, omnipaque, ondansetron, ondansetron, polyethylene glycol, sodium chloride 0.9%, traMADol    Review of Systems    Objective:     Vital Signs (Most Recent):  Temp: 98.1 °F (36.7 °C) (11/30/18 2030)  Pulse: (!) 125 (12/01/18 0807)  Resp: (!) 21 (12/01/18 0807)  BP: 127/78 (12/01/18 0807)  SpO2: 95 % (12/01/18 0807) Vital Signs (24h Range):  Temp:  [97.7 °F (36.5 °C)-98.9 °F (37.2 °C)] 98.1 °F (36.7 °C)  Pulse:  [101-125] 125  Resp:  [15-23] 21  SpO2:  [94 %-95 %] 95 %  BP: (116-142)/(71-93) 127/78     Weight: 51.9 kg (114 lb 8.5 oz)(standing)  Body mass index is 20.29 kg/m².    Intake/Output - Last 3 Shifts       11/29 0700 - 11/30 0659 11/30 0700 - 12/01 0659 12/01 0700 - 12/02 0659    P.O. 940 1180     I.V. (mL/kg) 50 (0.8) 50 (1)     Blood 212.5      IV Piggyback 100 250     Total Intake(mL/kg) 1302.5 (21.6) 1480 (28.5)     Urine (mL/kg/hr) 1100 (0.8) 1300 (1)     Emesis/NG output  0     Drains 1180 440 140    Other 250 220     Stool 0 0     Blood  0     Total Output 2530 1960 140    Net -1227.5 -480 -140           Urine Occurrence 1 x 5 x     Stool Occurrence 0 x 1 x     Emesis Occurrence  0 x           Physical Exam      Laboratory:  Immunosuppressants         Stop Route Frequency     tacrolimus capsule 1 mg      -- Oral 2 times daily        CBC:   Recent Labs   Lab 11/30/18  0826   WBC 3.07*   RBC 3.11*   HGB 9.0*   HCT 28.2*   *   MCV 91   MCH 28.9   MCHC 31.9*     CMP:   Recent Labs   Lab 12/01/18  0731   *   CALCIUM 8.7   ALBUMIN 2.6*   PROT 4.9*      K 3.9   CO2 30*   CL 98   BUN 61*   CREATININE 1.1   ALKPHOS 130   ALT 9*   AST 17   BILITOT 1.2*     Coagulation: No results  "for input(s): PT, INR, APTT in the last 168 hours.  Labs within the past 24 hours have been reviewed.    Diagnostic Results:  I have personally reviewed all pertinent imaging studies.    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   - POD 1 US: increased arterial resistive indices, suggestive of edema vs acute rejection vs congestion.  - Repeat US 10/9 with continued elevation of RIs.  - LFTs stable.  - T bili trending down.   - Repeat liver US (10/11): elevated RIs.  - Liver US 11/6 to assess for ascites. No arterial flow within the liver allograft concerning for interval thrombosis/occlusion of the arterial system. Severe ascites seen.  - Liver US 11/8: arterial flow seen w/ elevated RIs   - LFTs remain stable. No CTA at this time due to EVA and normal liver function.   - Liver US 11/15 showed "abnormally decreased main hepatic artery peak systolic velocity with slow arterial flow and abnormal tardus parvus waveforms intrahepatically."   -Vascular surgery consulted  - Liver US 11/19 showed no arterial flow on the right and sluggish flow on the left.   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 No further intervention planned at this time as patient's LFTs are stable. Will monitor.  - CT abd/pelvis 11/26 showed: 1. Persistent large volume of ascites. 2. Large right and moderate left pleural effusions with adjacent basilar atelectasis. 3. Anasarca  - IR placed perc drain 11/29 due to suspicion for chyle leak. 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  Fluid also growing gram positive cocci in chains, resembling strep. Final cx pending. Vanc started. ID consulted.          Tachycardia    - Pt w/ tachycardia 2-3 days, improved today, but still present.  - Started Metoprolol 12.5 mg BID 11/29 for " "rate control. HR improved, but still elevated. BP stable.  - 11/30 -> increased Metoprolol to 25 mg BID  - Monitor closely.       Pleural effusion    - Common post liver transplant  - CT 11/26 showed: Large right and moderate left pleural effusions with adjacent basilar atelectasis.  - Pt sp02 stable on RA, but c/o some orthopnea.  - Continue w/ diuresis.       Chronic kidney disease, stage III (moderate)    - Kidney fx improving  - Monitor       Hypomagnesemia    - Continue PO Mag Oxide  - IV Mag 2g 11/29 for Mag 1.3 + 2g 11/30 for Mag 1.5  - Continue to monitor.       Hypokalemia    - Replace as needed.   - Continue to monitor.     Hypophosphatemia    - Continue k phos and monitor closely.       Delayed surgical wound healing    - Middle chevron incision opened 11/16 with purulent drainage  - Packed with Aquacel ag at this time. Growing Klebsiella p.   - CT abd/pelvis 11/17- no signs of infection  - 11/17: R side of chevron incision w/ 3 small holes draining purulent fluid, opened and packed w/ aquacel.   - Wound care following.  - Wound w/ increased drainage, so wound care placed vac 11/24/18.  Apprec recs. Extending Ertapenem thru 11/28 per ID.    - Abdomen erythematous and indurated RLQ inferior to chevron.  - CT abd/pelvis 11/26 - no sign of infection  - Last wound vac change 11/30.    .       Hepatic artery thrombosis of transplanted liver    - Liver US 11/19 showed no arterial flow to R and sluggish flow to L  - Hepatic angiogram 11/19 confirmed HAT  - No further intervention planned at this time. Will continue to monitor.     Drug-induced pancytopenia    - Decreased plt, wbc, h/h  - See "leukopenia"  - Continue to monitor.     Thrombocytopenia, unspecified    - Monitor.       Alteration in skin integrity    - Monitor.       Leukopenia    - Stopped Cellcept, Bactrim, and Valcyte 10/31   - Cellcept half dose resumed 11/6  - Started Atovaquone 11/6.   - CMV PCR noted to be positive at 129. Will hold off on " "treatment with valcyte at this time. Monitoring weekly CMV PCRs.  - Cellcept held 11/23 2/2 infection (UTI + wound)  - Continue to monitor.       Azotemia    - CRRT 10/31 for uremic toxin clearance.           UTI (urinary tract infection)    - Urine Cx 10/15 w/ Klebsiella pneumonia.  - Started Ciprofloxacin (10/17-10/19).   - Started on amoxicillin/levo for H. Pylori (completed abx 11/2).  - Pt c/o lower back pain 11/22. UA w/ trace leuks, moderate wbs, 1+ blood, cx pending  - Pt symptomatic (back pain) & with ESBL in wound previously.  Ertapenem 1g QD x 3 days (started 11/23), per ID recs.  Given pt cont to have back pain and concern for infection in wound ( thick drainage and increased drainage)-  extended Ertapenem for 4 more days per ID (stop date 11/29/18).         Acute blood loss anemia    - Expected post transplant  - H&H cont to fluctuate.   - FOBT +.  - EGD 10/18 - showed diffuse bleeding with ulcerated duodenal mucosa.  - EGD 11/5 - improvement seen, no active bleeding.  - Resume heparin sq injections 11/6.   - Decrease heparin sq from TID to BID 11/15   - Heparin dc'd 11/17  - See "anemia requiring transfusions."  - Continue to monitor.             Post LT - Acute renal failure    - Creatinine on arrival 0.8 and trended up.  - Nephrology consulted.  - Renal US (10/11): no hydronephrosis.  - Echo 10/12: diastolic dysfunction, likely 2/2 to ARF.  - CRRT 10/14; SLED 10/17, 10/20. --> no improvements noted in kidney fxn.  - 24 hr urine creatine collection: CrCl of 7.   - CRRT 10/31 with 1L removed.   - See "hypervolemia associated with renal insufficiency"     Hypervolemia associated with renal insufficiency    - CRRT 10/14, 10/17, 10/20, 10/31.  - CXR 10/30: abundant fluid in b/l pleural space.  - Continue 40 mg Lasix BID.  - Monitor daily weights.       EVA (acute kidney injury)    - No plans for RRT in near future, per nephrology  - Trialysis line removed 11/13  - Nephrology involved. Appreciate recs.  - " Cr improved & stable.  - Continue to monitor.     At risk for opportunistic infections    - Valcyte for CMV prophylaxis--> holding (10/31) for leukopenia. Pt will need weekly CMV PCR while discontinued.  - CMV PCR noted to be positive at 129. .  - CMV PCR 11/8: 105  - CMV PCR 11/15 with 89 copies  - CMV PCR 11/23 95 - increased from previous. Continue to hold Valcyte & monitor pt closely.  - CMV PCR 11/29 pending  - Bactrim for PCP prophylaxis ([facility]).--> holding (10/31) for leukopenia.   - Start Atovaquone 11/6.  - Fluconazole for fungal prophylaxis (first dose 10/6). D/c Fluc 11/16 bc pt had been on it for > 30 days post take back.       Long-term use of immunosuppressant medication    - Prograf: stopped 10/29. Resumed 11/2.  --> Will monitor for signs of toxic side effects, check daily troughs, and change meds accordingly.   - Prednisone: stopped 10/29. Started on Hydrocortisone. pred taper resumed.  - Cellcept: stopped 10/31 for leukopenia. Restarted Cellcept 500 mg bid, 11/6.  - Cellcept held 11/23 2/2 infection         Prophylactic immunotherapy    - See long term use immunosuppresion.       Chylous ascites    - Paracentesis 10/1, 5L removed, no fluid sent for analysis.  - Pt remains volume overloaded.   - Liver US 11/6 with severe ascites.   - Paracentesis 11/7 with 8.9 L removed. Wbc 39, segs 16%. Gram stain - no organisms seen and no WBC. Cultures NGTD.  - Paracentesis 11/12: 8.6 L removed. 34 wbc, 25% segs, 33 lymphs, 42 monocytes/macrophages. Cultures NGTD  - Paracentesis 11/21 with 4 L removed, neg for infection.  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 Appearance and cell count of fluid (51% lymphs) consistent with chyle leak. Triglyceride level pending.   - Peritoneal fluid 11/29 growing gram positive cocci in chains, possible strep. Final cx pending. Vanc started. ID consulted.   - Plan for abdominal CT Monday to assess ascites.      Weakness    - See physical  deconditioning.       Physical deconditioning    - PT/OT consulted.  Progressing well w/ therapy.  - Recommend home health PT and rolling walker at discharge (orders placed 11/6).       Hypotension    - d/c'd midodrine 11/28 as patient has not required it recently  - Continue to monitor.     Anemia requiring transfusions    - Fluctuating H&H.    - Transfusions: 10/14; 10/16; 10/18; 10/19; 10/22; 10/27; 10/29, 10/31, 11/3, 11/21, 11/29  - FOBT +  - LDH elevated, Hapto < 10.  - EGD 10/18: ulcerated duodenal mucosa.   - Consulted GI about this ongoing issue and further need of another EGD.  - EGD 11/5 unremarkable.   - Iron studies 11/13:  95, TIBC 107, sat iron 89, transferrin 72, ferritin pending  - Hemolytic anemia workup 11/13: Haptoglobin 132, retic 3.9   - Last transfused 1upRBC 11/21. H/H now improved.   - H/H decreased to 7.6/24. Ordered type and screen, but no transfusion planned for today.   - H/H 7/22.6 11/29 - transfused 1u PRBC with good response.  - Continue to monitor.           Severe malnutrition    - Dietary consulted.   - Temporal and distal extremity muscle wasting and edema.  - Encourage supplements and to increase nutritional intake.  - Per nephrology, pt to be on low protein diet.  - Prealbumin reviewed.   - Tolerating diet.  Denies n/v.    -Told pt it's ok to resume outside protein shakes as renal fx stable, but will monitor closely.  - Will place pt on low fat, high protein diet due to suspicion for chyle leak.         Type 2 diabetes mellitus with diabetic polyneuropathy, with long-term current use of insulin    - Continue home regimen.  - Endocrine consulted.  - Pt off insulin drip at this time.  Apprec endo recs.        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  - Tx w/ Entecavir- dose changed to daily 11/26 due to improved renal fx             VTE Risk Mitigation (From admission, onward)        Ordered     heparin  (porcine) injection 5,000 Units  Every 8 hours      11/22/18 1014     IP VTE HIGH RISK PATIENT  Once      11/05/18 1145     Place sequential compression device  Until discontinued      10/05/18 0709          The patients clinical status was discussed at multidisplinary rounds, involving transplant surgery, transplant medicine, pharmacy, nursing, nutrition, and social work    Discharge Planning:  Not a candidate for d/c at this time.     Fred Cruz NP  Liver Transplant  [facility]

## 2018-12-01 NOTE — ASSESSMENT & PLAN NOTE
- Paracentesis 10/1, 5L removed, no fluid sent for analysis.  - Pt remains volume overloaded.   - Liver US 11/6 with severe ascites.   - Paracentesis 11/7 with 8.9 L removed. Wbc 39, segs 16%. Gram stain - no organisms seen and no WBC. Cultures NGTD.  - Paracentesis 11/12: 8.6 L removed. 34 wbc, 25% segs, 33 lymphs, 42 monocytes/macrophages. Cultures NGTD  - Paracentesis 11/21 with 4 L removed, neg for infection.  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 Appearance and cell count of fluid (51% lymphs) consistent with chyle leak. Triglyceride level pending.   - Peritoneal fluid 11/29 growing gram positive cocci in chains, possible strep. Final cx pending. Vanc started. ID consulted.   - Plan for abdominal CT Monday to assess ascites.

## 2018-12-01 NOTE — ASSESSMENT & PLAN NOTE
- 67 y/o female with PMHx Hepatitis B, D cirrhosis.   - Admitted 10/1/18 underwent liver transplant 10/5/18 CMV D+/R+ steroid induction on maintenance MM/tacro/pred.  - now with possible Chyle leak fluid with milky appearance predominately lymphocytes 175 WBC 18% segs.   - cultures from sample growin GPCs that ressemble Strep pending final ID   - Recommend at this time to dose Vanc one time to cover Strep species since in the past has had viridans in samples, pending final result will then tailor further.    Recommendations   - one time dose of vancomycin   - will adjust management depending final culture results

## 2018-12-01 NOTE — PROGRESS NOTES
[facility]  Infectious Disease  Progress Note    Patient Name: Scarlett Reddy  MRN: [id number]  Admission Date: 10/1/2018  Length of Stay: 59 days  Attending Physician: Jani Theodore MD  Primary Care Provider: Primary Doctor No    Isolation Status: Contact  Assessment/Plan:      Ascites    - 67 y/o female with PMHx Hepatitis B, D cirrhosis.   - Admitted 10/1/18 underwent liver transplant 10/5/18 CMV D+/R+ steroid induction on maintenance MM/tacro/pred.  - now with possible Chyle leak fluid with milky appearance predominately lymphocytes 175 WBC 18% segs.   - cultures from sample growin GPCs that ressemble Strep pending final ID   - Recommend at this time to dose Vanc one time to cover Strep species since in the past has had viridans in samples, pending final result will then tailor further.    Recommendations   - one time dose of vancomycin   - will adjust management depending final culture results             Anticipated Disposition:     Thank you for your consult. I will follow-up with patient. Please contact us if you have any additional questions.    Lisa Xavier MD  Infectious Disease  [facility]    Subjective:     Principal Problem:Liver transplanted    HPI: Case of 67 y/o female with PMHx Hepatitis B, D cirrhosis. Admitted 10/1/18 underwent liver transplant 10/5/18 CMV D+/R+ steroid induction on maintenance MM/tacro/pred. She was taken back to the OR on 10/6 due to increasing LFT's and suspected biliary leak.  On revision no leak was identified. Patient has been on pip/tazo to cover possible infections and on ppx therapy with bactrim, fluconazole, valgancyclovir; on entecavir for hepatitis B. ID consulted to comment on ESBL Kelb UTI and diphtheroids from ascitic fluid.    Interval History: ID called back due to positive culture from chyle leak growing GPCs resembling Strep     Review of Systems   Constitutional: Negative for chills, fatigue and fever.    Gastrointestinal: Positive for abdominal distention and abdominal pain. Negative for diarrhea, nausea and vomiting.     Objective:     Vital Signs (Most Recent):  Temp: 97.7 °F (36.5 °C) (11/30/18 1544)  Pulse: (!) 113 (11/30/18 0856)  Resp: 20 (11/30/18 0856)  BP: (!) 137/91 (11/30/18 0856)  SpO2: 97 % (11/30/18 0856) Vital Signs (24h Range):  Temp:  [97.7 °F (36.5 °C)-98.9 °F (37.2 °C)] 97.7 °F (36.5 °C)  Pulse:  [101-123] 113  Resp:  [13-24] 20  SpO2:  [94 %-97 %] 97 %  BP: (131-144)/(75-91) 137/91     Weight: 60.3 kg (132 lb 15 oz)(standing scale)  Body mass index is 23.55 kg/m².    Estimated Creatinine Clearance: 36.6 mL/min (based on SCr of 1.2 mg/dL).    Physical Exam   Constitutional: She is oriented to person, place, and time. She appears well-developed and well-nourished.   HENT:   Head: Normocephalic and atraumatic.   Eyes: EOM are normal. Pupils are equal, round, and reactive to light.   Neck: Normal range of motion.   Cardiovascular: Normal rate, regular rhythm and normal heart sounds.   Pulmonary/Chest: Effort normal and breath sounds normal.   Abdominal: Soft. Bowel sounds are normal.   Musculoskeletal: Normal range of motion. She exhibits no edema.   Neurological: She is alert and oriented to person, place, and time.   Skin: No rash noted.       Significant Labs:   Blood Culture:   Recent Labs   Lab 09/17/18  1050 09/17/18  1200 10/15/18  1332 10/16/18  1839 10/16/18  1907   LABBLOO No growth after 5 days. No growth after 5 days. No growth after 5 days. No growth after 5 days. No growth after 5 days.     BMP:   Recent Labs   Lab 11/30/18  0825   *      K 4.0   CL 96   CO2 29   BUN 55*   CREATININE 1.2   CALCIUM 8.6*   MG 1.5*     CBC:   Recent Labs   Lab 11/29/18  0734 11/30/18  0826   WBC 2.94* 3.07*   HGB 7.0* 9.0*   HCT 22.6* 28.2*   * 110*     CMP:   Recent Labs   Lab 11/29/18  0734 11/30/18  0825    136   K 3.3* 4.0   CL 98 96   CO2 28 29   * 287*   BUN 58* 55*    CREATININE 1.2 1.2   CALCIUM 8.4* 8.6*   PROT 4.9* 5.2*   ALBUMIN 2.9* 2.9*   BILITOT 1.1* 1.5*   ALKPHOS 123 128   AST 16 19   ALT 10 10   ANIONGAP 11 11   EGFRNONAA 46.2* 46.2*     Microbiology Results (last 7 days)     Procedure Component Value Units Date/Time    Culture, Body Fluid - Bactec [[id number]] Collected:  11/29/18 1229    Order Status:  Completed Specimen:  Body Fluid from Peritoneal Fluid Updated:  11/30/18 0912     Body Fluid Culture, Sterile Gram stain: Gram positive cocci in chains resembling Strep    Gram stain [[id number]] Collected:  11/29/18 1229    Order Status:  Completed Specimen:  Body Fluid from Abdomen Updated:  11/29/18 1758     Gram Stain Result Rare WBC's      No organisms seen    Fungus culture [[id number]]     Order Status:  Completed Specimen:  Abdominal from Abdomen     Fungus culture [[id number]] Collected:  11/29/18 1229    Order Status:  Sent Specimen:  Body Fluid from Abdomen Updated:  11/29/18 1351    Culture, Anaerobic [[id number]] Collected:  11/21/18 1545    Order Status:  Completed Specimen:  Body Fluid from Peritoneal Fluid Updated:  11/28/18 0749     Anaerobic Culture No anaerobes isolated    Fungus culture [[id number]] Collected:  11/21/18 1545    Order Status:  Completed Specimen:  Body Fluid from Peritoneal Fluid Updated:  11/27/18 1122     Fungus (Mycology) Culture Culture in progress    Fungus culture [[id number]] Collected:  11/12/18 1542    Order Status:  Completed Specimen:  Ascites from Abdomen Updated:  11/27/18 1024     Fungus (Mycology) Culture Culture in progress     Fungus (Mycology) Culture No fungus isolated after 2 weeks    Fungus culture [[id number]] Collected:  11/07/18 1435    Order Status:  Completed Specimen:  Abdominal from Abdomen Updated:  11/27/18 1001     Fungus (Mycology) Culture Culture in progress     Fungus (Mycology) Culture No fungus isolated after 2 weeks    Stool culture [[id number]] Collected:  11/22/18 1447    Order Status:  Completed  Specimen:  Stool Updated:  11/25/18 1517     Stool Culture No Salmonella,Shigella,Vibrio,Campylobacter,Yersinia isolated.    Urine culture [[id number]]     Order Status:  Canceled Specimen:  Urine, Clean Catch     Urine culture [[id number]]  (Susceptibility) Collected:  11/22/18 1701    Order Status:  Completed Specimen:  Urine, Clean Catch Updated:  11/25/18 1022     Urine Culture, Routine --     KLEBSIELLA PNEUMONIAE ESBL  > 100,000 cfu/ml      Narrative:       grey    Culture, Body Fluid (Aerobic) w/ GS [[id number]] Collected:  11/21/18 1545    Order Status:  Completed Specimen:  Body Fluid from Peritoneal Fluid Updated:  11/25/18 0936     AEROBIC CULTURE - FLUID No growth     Gram Stain Result No WBC's      No organisms seen          Significant Imaging: I have reviewed all pertinent imaging results/findings within the past 24 hours.

## 2018-12-01 NOTE — SUBJECTIVE & OBJECTIVE
Scheduled Meds:   aspirin  81 mg Oral Daily    atovaquone  1,500 mg Oral Daily    entecavir  0.5 mg Oral Daily    furosemide  40 mg Oral BID    heparin (porcine)  5,000 Units Subcutaneous Q8H    insulin aspart U-100  7-14 Units Subcutaneous TIDWM    insulin detemir U-100  8 Units Subcutaneous Daily    k phos di & mono-sod phos mono  2 tablet Oral BID    magnesium oxide  800 mg Oral BID    metoprolol tartrate  25 mg Oral BID    pantoprazole  40 mg Oral BID    predniSONE  2.5 mg Oral Daily    tacrolimus  1 mg Oral BID    vancomycin (VANCOCIN) IVPB  750 mg Intravenous Q24H     Continuous Infusions:  PRN Meds:sodium chloride, acetaminophen, bisacodyl, calcium carbonate, dextrose 50%, dextrose 50%, glucagon (human recombinant), glucose, glucose, insulin aspart U-100, omnipaque, ondansetron, ondansetron, polyethylene glycol, sodium chloride 0.9%, traMADol    Review of Systems   Constitutional: Positive for activity change and appetite change (improving). Negative for chills, fatigue and fever.   HENT: Negative for congestion, facial swelling and trouble swallowing.    Eyes: Negative for pain, discharge and visual disturbance.   Respiratory: Negative for cough, chest tightness, shortness of breath and wheezing.    Cardiovascular: Positive for leg swelling. Negative for chest pain and palpitations.   Gastrointestinal: Positive for abdominal distention and abdominal pain. Negative for constipation, diarrhea, nausea and vomiting.   Endocrine: Negative.    Genitourinary: Negative for decreased urine volume, difficulty urinating and dysuria.   Musculoskeletal: Positive for back pain and neck pain. Negative for arthralgias and neck stiffness.   Skin: Positive for wound.   Allergic/Immunologic: Positive for immunocompromised state.   Neurological: Positive for weakness. Negative for tremors and headaches.   Psychiatric/Behavioral: Negative for confusion and sleep disturbance.     Objective:     Vital Signs (Most  Recent):  Temp: 98.1 °F (36.7 °C) (11/30/18 2030)  Pulse: (!) 125 (12/01/18 0807)  Resp: (!) 21 (12/01/18 0807)  BP: 127/78 (12/01/18 0807)  SpO2: 95 % (12/01/18 0807) Vital Signs (24h Range):  Temp:  [97.7 °F (36.5 °C)-98.9 °F (37.2 °C)] 98.1 °F (36.7 °C)  Pulse:  [101-125] 125  Resp:  [15-23] 21  SpO2:  [94 %-95 %] 95 %  BP: (116-142)/(71-93) 127/78     Weight: 51.9 kg (114 lb 8.5 oz)(standing)  Body mass index is 20.29 kg/m².    Intake/Output - Last 3 Shifts       11/29 0700 - 11/30 0659 11/30 0700 - 12/01 0659 12/01 0700 - 12/02 0659    P.O. 940 1180     I.V. (mL/kg) 50 (0.8) 50 (1)     Blood 212.5      IV Piggyback 100 250     Total Intake(mL/kg) 1302.5 (21.6) 1480 (28.5)     Urine (mL/kg/hr) 1100 (0.8) 1300 (1)     Emesis/NG output  0     Drains 1180 440 140    Other 250 220     Stool 0 0     Blood  0     Total Output 2530 1960 140    Net -1227.5 -480 -140           Urine Occurrence 1 x 5 x     Stool Occurrence 0 x 1 x     Emesis Occurrence  0 x           Physical Exam   Constitutional: She is oriented to person, place, and time. She appears well-developed. No distress.   Temporal and distal extremity muscle wasting   HENT:   Head: Normocephalic and atraumatic.   Eyes: EOM are normal. No scleral icterus.   Neck: Normal range of motion.   Cardiovascular: Normal rate, regular rhythm and normal heart sounds.   No murmur heard.  Pulmonary/Chest: Effort normal. No respiratory distress. She has no wheezes.   Breath sounds decreased at bases   Abdominal: Soft. Bowel sounds are normal. She exhibits distension. There is tenderness. There is no guarding.   Wound vac placed to middle chevron incision + R lateral side, no s/s/i  Erythema + induration RLQ, improving  RLQ VALORIE drain sites c/d/i with sutures   Musculoskeletal: Normal range of motion. She exhibits edema (3+ BLE, + generalized).   Neurological: She is alert and oriented to person, place, and time.   Skin: Skin is warm. She is not diaphoretic.   Psychiatric: She  has a normal mood and affect.   Nursing note and vitals reviewed.      Laboratory:  Immunosuppressants         Stop Route Frequency     tacrolimus capsule 1 mg      -- Oral 2 times daily        CBC:   Recent Labs   Lab 11/30/18  0826   WBC 3.07*   RBC 3.11*   HGB 9.0*   HCT 28.2*   *   MCV 91   MCH 28.9   MCHC 31.9*     CMP:   Recent Labs   Lab 12/01/18  0731   *   CALCIUM 8.7   ALBUMIN 2.6*   PROT 4.9*      K 3.9   CO2 30*   CL 98   BUN 61*   CREATININE 1.1   ALKPHOS 130   ALT 9*   AST 17   BILITOT 1.2*     Labs within the past 24 hours have been reviewed.    Diagnostic Results:  I have personally reviewed all pertinent imaging studies.Scheduled Meds:   aspirin  81 mg Oral Daily    atovaquone  1,500 mg Oral Daily    entecavir  0.5 mg Oral Daily    furosemide  40 mg Oral BID    heparin (porcine)  5,000 Units Subcutaneous Q8H    insulin aspart U-100  7-14 Units Subcutaneous TIDWM    insulin detemir U-100  8 Units Subcutaneous Daily    k phos di & mono-sod phos mono  2 tablet Oral BID    magnesium oxide  800 mg Oral BID    metoprolol tartrate  25 mg Oral BID    pantoprazole  40 mg Oral BID    predniSONE  2.5 mg Oral Daily    tacrolimus  1 mg Oral BID    vancomycin (VANCOCIN) IVPB  750 mg Intravenous Q24H     Continuous Infusions:  PRN Meds:sodium chloride, acetaminophen, bisacodyl, calcium carbonate, dextrose 50%, dextrose 50%, glucagon (human recombinant), glucose, glucose, insulin aspart U-100, omnipaque, ondansetron, ondansetron, polyethylene glycol, sodium chloride 0.9%, traMADol    Review of Systems    Objective:     Vital Signs (Most Recent):  Temp: 98.1 °F (36.7 °C) (11/30/18 2030)  Pulse: (!) 125 (12/01/18 0807)  Resp: (!) 21 (12/01/18 0807)  BP: 127/78 (12/01/18 0807)  SpO2: 95 % (12/01/18 0807) Vital Signs (24h Range):  Temp:  [97.7 °F (36.5 °C)-98.9 °F (37.2 °C)] 98.1 °F (36.7 °C)  Pulse:  [101-125] 125  Resp:  [15-23] 21  SpO2:  [94 %-95 %] 95 %  BP: (116-142)/(71-93)  127/78     Weight: 51.9 kg (114 lb 8.5 oz)(standing)  Body mass index is 20.29 kg/m².    Intake/Output - Last 3 Shifts       11/29 0700 - 11/30 0659 11/30 0700 - 12/01 0659 12/01 0700 - 12/02 0659    P.O. 940 1180     I.V. (mL/kg) 50 (0.8) 50 (1)     Blood 212.5      IV Piggyback 100 250     Total Intake(mL/kg) 1302.5 (21.6) 1480 (28.5)     Urine (mL/kg/hr) 1100 (0.8) 1300 (1)     Emesis/NG output  0     Drains 1180 440 140    Other 250 220     Stool 0 0     Blood  0     Total Output 2530 1960 140    Net -1227.5 -480 -140           Urine Occurrence 1 x 5 x     Stool Occurrence 0 x 1 x     Emesis Occurrence  0 x           Physical Exam      Laboratory:  Immunosuppressants         Stop Route Frequency     tacrolimus capsule 1 mg      -- Oral 2 times daily        CBC:   Recent Labs   Lab 11/30/18  0826   WBC 3.07*   RBC 3.11*   HGB 9.0*   HCT 28.2*   *   MCV 91   MCH 28.9   MCHC 31.9*     CMP:   Recent Labs   Lab 12/01/18  0731   *   CALCIUM 8.7   ALBUMIN 2.6*   PROT 4.9*      K 3.9   CO2 30*   CL 98   BUN 61*   CREATININE 1.1   ALKPHOS 130   ALT 9*   AST 17   BILITOT 1.2*     Coagulation: No results for input(s): PT, INR, APTT in the last 168 hours.  Labs within the past 24 hours have been reviewed.    Diagnostic Results:  I have personally reviewed all pertinent imaging studies.

## 2018-12-01 NOTE — ASSESSMENT & PLAN NOTE
Avoid insulin stacking and hypoglycemia.  Lab Results   Component Value Date    CREATININE 1.1 12/01/2018        patient refused

## 2018-12-01 NOTE — SUBJECTIVE & OBJECTIVE
Interval History: ID called back due to positive culture from chyle leak growing GPCs resembling Strep     Review of Systems   Constitutional: Negative for chills, fatigue and fever.   Gastrointestinal: Positive for abdominal distention and abdominal pain. Negative for diarrhea, nausea and vomiting.     Objective:     Vital Signs (Most Recent):  Temp: 97.7 °F (36.5 °C) (11/30/18 1544)  Pulse: (!) 113 (11/30/18 0856)  Resp: 20 (11/30/18 0856)  BP: (!) 137/91 (11/30/18 0856)  SpO2: 97 % (11/30/18 0856) Vital Signs (24h Range):  Temp:  [97.7 °F (36.5 °C)-98.9 °F (37.2 °C)] 97.7 °F (36.5 °C)  Pulse:  [101-123] 113  Resp:  [13-24] 20  SpO2:  [94 %-97 %] 97 %  BP: (131-144)/(75-91) 137/91     Weight: 60.3 kg (132 lb 15 oz)(standing scale)  Body mass index is 23.55 kg/m².    Estimated Creatinine Clearance: 36.6 mL/min (based on SCr of 1.2 mg/dL).    Physical Exam   Constitutional: She is oriented to person, place, and time. She appears well-developed and well-nourished.   HENT:   Head: Normocephalic and atraumatic.   Eyes: EOM are normal. Pupils are equal, round, and reactive to light.   Neck: Normal range of motion.   Cardiovascular: Normal rate, regular rhythm and normal heart sounds.   Pulmonary/Chest: Effort normal and breath sounds normal.   Abdominal: Soft. Bowel sounds are normal.   Musculoskeletal: Normal range of motion. She exhibits no edema.   Neurological: She is alert and oriented to person, place, and time.   Skin: No rash noted.       Significant Labs:   Blood Culture:   Recent Labs   Lab 09/17/18  1050 09/17/18  1200 10/15/18  1332 10/16/18  1839 10/16/18  1907   LABBLOO No growth after 5 days. No growth after 5 days. No growth after 5 days. No growth after 5 days. No growth after 5 days.     BMP:   Recent Labs   Lab 11/30/18  0825   *      K 4.0   CL 96   CO2 29   BUN 55*   CREATININE 1.2   CALCIUM 8.6*   MG 1.5*     CBC:   Recent Labs   Lab 11/29/18  0734 11/30/18  0826   WBC 2.94* 3.07*    HGB 7.0* 9.0*   HCT 22.6* 28.2*   * 110*     CMP:   Recent Labs   Lab 11/29/18  0734 11/30/18  0825    136   K 3.3* 4.0   CL 98 96   CO2 28 29   * 287*   BUN 58* 55*   CREATININE 1.2 1.2   CALCIUM 8.4* 8.6*   PROT 4.9* 5.2*   ALBUMIN 2.9* 2.9*   BILITOT 1.1* 1.5*   ALKPHOS 123 128   AST 16 19   ALT 10 10   ANIONGAP 11 11   EGFRNONAA 46.2* 46.2*     Microbiology Results (last 7 days)     Procedure Component Value Units Date/Time    Culture, Body Fluid - Bactec [[id number]] Collected:  11/29/18 1229    Order Status:  Completed Specimen:  Body Fluid from Peritoneal Fluid Updated:  11/30/18 0912     Body Fluid Culture, Sterile Gram stain: Gram positive cocci in chains resembling Strep    Gram stain [[id number]] Collected:  11/29/18 1229    Order Status:  Completed Specimen:  Body Fluid from Abdomen Updated:  11/29/18 1758     Gram Stain Result Rare WBC's      No organisms seen    Fungus culture [[id number]]     Order Status:  Completed Specimen:  Abdominal from Abdomen     Fungus culture [[id number]] Collected:  11/29/18 1229    Order Status:  Sent Specimen:  Body Fluid from Abdomen Updated:  11/29/18 1351    Culture, Anaerobic [[id number]] Collected:  11/21/18 1545    Order Status:  Completed Specimen:  Body Fluid from Peritoneal Fluid Updated:  11/28/18 0749     Anaerobic Culture No anaerobes isolated    Fungus culture [[id number]] Collected:  11/21/18 1545    Order Status:  Completed Specimen:  Body Fluid from Peritoneal Fluid Updated:  11/27/18 1122     Fungus (Mycology) Culture Culture in progress    Fungus culture [[id number]] Collected:  11/12/18 1542    Order Status:  Completed Specimen:  Ascites from Abdomen Updated:  11/27/18 1024     Fungus (Mycology) Culture Culture in progress     Fungus (Mycology) Culture No fungus isolated after 2 weeks    Fungus culture [[id number]] Collected:  11/07/18 1435    Order Status:  Completed Specimen:  Abdominal from Abdomen Updated:  11/27/18 1001     Fungus  (Mycology) Culture Culture in progress     Fungus (Mycology) Culture No fungus isolated after 2 weeks    Stool culture [[id number]] Collected:  11/22/18 1447    Order Status:  Completed Specimen:  Stool Updated:  11/25/18 1517     Stool Culture No Salmonella,Shigella,Vibrio,Campylobacter,Yersinia isolated.    Urine culture [[id number]]     Order Status:  Canceled Specimen:  Urine, Clean Catch     Urine culture [[id number]]  (Susceptibility) Collected:  11/22/18 1701    Order Status:  Completed Specimen:  Urine, Clean Catch Updated:  11/25/18 1022     Urine Culture, Routine --     KLEBSIELLA PNEUMONIAE ESBL  > 100,000 cfu/ml      Narrative:       grey    Culture, Body Fluid (Aerobic) w/ GS [[id number]] Collected:  11/21/18 1545    Order Status:  Completed Specimen:  Body Fluid from Peritoneal Fluid Updated:  11/25/18 0936     AEROBIC CULTURE - FLUID No growth     Gram Stain Result No WBC's      No organisms seen          Significant Imaging: I have reviewed all pertinent imaging results/findings within the past 24 hours.

## 2018-12-01 NOTE — PLAN OF CARE
Pt in bathroom, enterococcus growing from ascitic fluid, can continue vancomycin for now, discussed with team

## 2018-12-01 NOTE — PLAN OF CARE
Problem: Patient Care Overview  Goal: Plan of Care Review  Outcome: Ongoing (interventions implemented as appropriate)  Pt AAOx4, VSS, afebrile.  Currently denies c/o pain.  1300cc yellow urine 1 BM overnight.  Drain emptied q4h 370cc milky output.  Wound vac to R chevron 150cc milky white output.  Self medications not pulled by .  Updated box and blue card.   refusing to pull d/t stating already know how to pull medications.  Fall risk precautions initiated.  Pt in lowest bed position setting, lighting adjusted, pt to wear nonskid socks when ambulating, side rails up x2.  Pt remain free from falls during shift.  Pt verbalize understanding to call when needed assistance. Call light within reach.  Will continue to monitor.

## 2018-12-01 NOTE — PROGRESS NOTES
"[facility]  Endocrinology  Progress Note    Admit Date: 10/1/2018     Reason for Consult: Management of type 2 DM, Hyperglycemia      Surgical Procedure and Date: Liver transplant 10/5/18, Ex lap 10/6/18     Diabetes diagnosis year:      Home Diabetes Medications:  Lantus 18 units HS and novolog 17 units with meals plus correction scale (150-200 +1)        Lab Results   Component Value Date     HGBA1C 6.8 (H) 2018        How often checking glucose at home? 3-4   BG readings on regimen: 120-150.  Post prandial readings as high as upper 200s  Hypoglycemia on the regimen? yes, none recently   Missed doses on regimen?  No     Diabetes Complications include:     Diabetic peripheral neuropathy      Complicating diabetes co morbidities:   CIRRHOSIS        HPI:  Patient is a 68 y.o. female with a diagnosis of ESLD, listed for liver transplant with meld 23 who underwent live transplant on 10/5/18.  Back to OR on 10/6/18 for ex lap.  Admitted 10/1/18 for hyponatremia s/p paracentesis.  Personal has known diagnosis of diabetes, endocrine consulted for diabetes management.    Post-operative course complicated by ESBL Klebsiella pneumoniae in urine (in contact isolation), peritransplant ascites, worsening renal function (required temporary dialysis), anemia (multiple blood transfusions), and possible GI bleed.     Interval HPI:   Overnight events: Remains in TSU, NAEON.  Prednisone 2.5 mg daily, standard steroid taper.  BG fairly well controlled yesterday but elevated this am (269).  Eatin%  Nausea: No  Hypoglycemia and intervention: No  Fever: No  TPN and/or TF: No    /78   Pulse (!) 125   Temp 98.1 °F (36.7 °C) (Oral)   Resp (!) 21   Ht 5' 3" (1.6 m)   Wt 51.9 kg (114 lb 8.5 oz) Comment: standing  LMP  (LMP Unknown)   SpO2 95%   Breastfeeding? No   BMI 20.29 kg/m²      Labs Reviewed and Include    Recent Labs   Lab 18  0731   *   CALCIUM 8.7   ALBUMIN 2.6* "   PROT 4.9*      K 3.9   CO2 30*   CL 98   BUN 61*   CREATININE 1.1   ALKPHOS 130   ALT 9*   AST 17   BILITOT 1.2*     Lab Results   Component Value Date    WBC 2.94 (L) 12/01/2018    HGB 8.6 (L) 12/01/2018    HCT 27.2 (L) 12/01/2018    MCV 90 12/01/2018     (L) 12/01/2018     No results for input(s): TSH, FREET4 in the last 168 hours.  Lab Results   Component Value Date    HGBA1C 6.0 (H) 10/04/2018       Nutritional status:   Body mass index is 20.29 kg/m².  Lab Results   Component Value Date    ALBUMIN 2.6 (L) 12/01/2018    ALBUMIN 2.9 (L) 11/30/2018    ALBUMIN 2.9 (L) 11/29/2018     Lab Results   Component Value Date    PREALBUMIN 15 (L) 11/03/2018    PREALBUMIN 12 (L) 09/17/2018       Estimated Creatinine Clearance: 39.6 mL/min (based on SCr of 1.1 mg/dL).    Accu-Checks  Recent Labs     11/28/18  2212 11/29/18  0834 11/29/18  1404 11/29/18  1724 11/29/18  2210 11/30/18  0827 11/30/18  1238 11/30/18  1802 11/30/18  2146 12/01/18  0820   POCTGLUCOSE 130* 299* 291* 249* 323* 301* 264* 129* 193* 269*       Current Medications and/or Treatments Impacting Glycemic Control  Immunotherapy:    Immunosuppressants         Stop Route Frequency     tacrolimus capsule 1 mg      -- Oral 2 times daily        Steroids:   Hormones (From admission, onward)    Start     Stop Route Frequency Ordered    12/01/18 0900  predniSONE tablet 2.5 mg      12/08 0859 Oral Daily 11/05/18 1004    10/17/18 0945  predniSONE tablet 15 mg      10/24 0859 Oral Daily 10/17/18 0943        Pressors:    Autonomic Drugs (From admission, onward)    Start     Stop Route Frequency Ordered    10/06/18 1617  rocuronium (ZEMURON) 10 mg/mL injection     Comments:  Created by cabinet override    10/07 0429   10/06/18 1617        Hyperglycemia/Diabetes Medications:   Antihyperglycemics (From admission, onward)    Start     Stop Route Frequency Ordered    11/30/18 1000  insulin detemir U-100 pen 8 Units      -- SubQ Daily 11/30/18 0938    11/28/18  1300  insulin aspart U-100 pen 7-14 Units      -- SubQ 3 times daily with meals 11/28/18 1247    10/14/18 0925  insulin aspart U-100 pen 0-5 Units      -- SubQ Before meals & nightly PRN 10/14/18 0825    10/07/18 1200  insulin regular (Humulin R) 100 Units in sodium chloride 0.9% 100 mL infusion      10/13 2100 IV Continuous 10/07/18 1055          ASSESSMENT and PLAN    * Liver transplanted    Managed per primary team  Avoid hypoglycemia    Recent Labs   Lab 12/01/18  0731   ALT 9*   AST 17   ALKPHOS 130   BILITOT 1.2*   PROT 4.9*   ALBUMIN 2.6*            Type 2 diabetes mellitus with diabetic polyneuropathy, with long-term current use of insulin    BG goal 140-180    Increase Levemir to 10 units daily  Novolog mealtime insulin dose to 7-14.  Administer    7    units subcutaneously with meals if patient eats < 50% or low carb meal; administer    14    units if patient eats >/= to 50% or high carb meal  Change correction scale to moderate given improving kidney function  BG monitoring AC/HS    Start self/caregiver administration of insulin to review for home.     Discharge planning:  TBD     Severe malnutrition    Oral intake encouraged, may affect BG readings     UTI (urinary tract infection)    Infection may increase insulin resistance.          Prophylactic immunotherapy    May increase insulin resistance.        Alteration in skin integrity    Infection may increase insulin resistance.   Wound vac  Optimize BG control.        EVA (acute kidney injury)    Avoid insulin stacking and hypoglycemia.  Lab Results   Component Value Date    CREATININE 1.1 12/01/2018              Be Willams NP  Endocrinology  [facility]

## 2018-12-01 NOTE — ASSESSMENT & PLAN NOTE
BG goal 140-180    Increase Levemir to 10 units daily  Novolog mealtime insulin dose to 7-14.  Administer    7    units subcutaneously with meals if patient eats < 50% or low carb meal; administer    14    units if patient eats >/= to 50% or high carb meal  Change correction scale to moderate given improving kidney function  BG monitoring AC/HS    Start self/caregiver administration of insulin to review for home.     Discharge planning:  TBGOVIND

## 2018-12-01 NOTE — PLAN OF CARE
Problem: Patient Care Overview  Goal: Plan of Care Review  Outcome: Ongoing (interventions implemented as appropriate)  Pt AAO x4 throughout shift.  at bedside refusing to do self meds. Pt up with assistance and walker throughout shift. Pt free from falls and injury. Wound vac in place with continuous therapy. Drain putting out milky white fluid. BG monitored AC/HS per order. SSI given.

## 2018-12-01 NOTE — SUBJECTIVE & OBJECTIVE
"Interval HPI:   Overnight events: Remains in TSU, RYLEYON.  Prednisone 2.5 mg daily, standard steroid taper.  BG fairly well controlled yesterday but elevated this am (269).  Eatin%  Nausea: No  Hypoglycemia and intervention: No  Fever: No  TPN and/or TF: No    /78   Pulse (!) 125   Temp 98.1 °F (36.7 °C) (Oral)   Resp (!) 21   Ht 5' 3" (1.6 m)   Wt 51.9 kg (114 lb 8.5 oz) Comment: standing  LMP  (LMP Unknown)   SpO2 95%   Breastfeeding? No   BMI 20.29 kg/m²     Labs Reviewed and Include    Recent Labs   Lab 18  0731   *   CALCIUM 8.7   ALBUMIN 2.6*   PROT 4.9*      K 3.9   CO2 30*   CL 98   BUN 61*   CREATININE 1.1   ALKPHOS 130   ALT 9*   AST 17   BILITOT 1.2*     Lab Results   Component Value Date    WBC 2.94 (L) 2018    HGB 8.6 (L) 2018    HCT 27.2 (L) 2018    MCV 90 2018     (L) 2018     No results for input(s): TSH, FREET4 in the last 168 hours.  Lab Results   Component Value Date    HGBA1C 6.0 (H) 10/04/2018       Nutritional status:   Body mass index is 20.29 kg/m².  Lab Results   Component Value Date    ALBUMIN 2.6 (L) 2018    ALBUMIN 2.9 (L) 2018    ALBUMIN 2.9 (L) 2018     Lab Results   Component Value Date    PREALBUMIN 15 (L) 2018    PREALBUMIN 12 (L) 2018       Estimated Creatinine Clearance: 39.6 mL/min (based on SCr of 1.1 mg/dL).    Accu-Checks  Recent Labs     18  2212 18  0834 18  1404 18  1724 18  2210 18  0827 18  1238 18  1802 18  2146 18  0820   POCTGLUCOSE 130* 299* 291* 249* 323* 301* 264* 129* 193* 269*       Current Medications and/or Treatments Impacting Glycemic Control  Immunotherapy:    Immunosuppressants         Stop Route Frequency     tacrolimus capsule 1 mg      -- Oral 2 times daily        Steroids:   Hormones (From admission, onward)    Start     Stop Route Frequency Ordered    18 0900  predniSONE tablet 2.5 " mg      12/08 0859 Oral Daily 11/05/18 1004    10/17/18 0945  predniSONE tablet 15 mg      10/24 0859 Oral Daily 10/17/18 0943        Pressors:    Autonomic Drugs (From admission, onward)    Start     Stop Route Frequency Ordered    10/06/18 1617  rocuronium (ZEMURON) 10 mg/mL injection     Comments:  Created by cabinet override    10/07 0429   10/06/18 1617        Hyperglycemia/Diabetes Medications:   Antihyperglycemics (From admission, onward)    Start     Stop Route Frequency Ordered    11/30/18 1000  insulin detemir U-100 pen 8 Units      -- SubQ Daily 11/30/18 0938    11/28/18 1300  insulin aspart U-100 pen 7-14 Units      -- SubQ 3 times daily with meals 11/28/18 1247    10/14/18 0925  insulin aspart U-100 pen 0-5 Units      -- SubQ Before meals & nightly PRN 10/14/18 0825    10/07/18 1200  insulin regular (Humulin R) 100 Units in sodium chloride 0.9% 100 mL infusion      10/13 2100 IV Continuous 10/07/18 1055

## 2018-12-01 NOTE — PROGRESS NOTES
[facility]  Adult Nutrition  Progress Note     SUMMARY       Dietitian educated and provided handout to family regarding low chol/low fat diet. Spoke with  and  yesterday concerning meals provided by the hospital that meets dietary and cultural restrictions.    Thanks    Sherrie   [phone]

## 2018-12-01 NOTE — ASSESSMENT & PLAN NOTE
Managed per primary team  Avoid hypoglycemia    Recent Labs   Lab 12/01/18  0731   ALT 9*   AST 17   ALKPHOS 130   BILITOT 1.2*   PROT 4.9*   ALBUMIN 2.6*

## 2018-12-02 LAB
ALBUMIN SERPL BCP-MCNC: 2.6 G/DL
ALP SERPL-CCNC: 122 U/L
ALT SERPL W/O P-5'-P-CCNC: 9 U/L
ANION GAP SERPL CALC-SCNC: 11 MMOL/L
AST SERPL-CCNC: 18 U/L
BACTERIA FLD CULT: NORMAL
BACTERIA FLD CULT: NORMAL
BASOPHILS # BLD AUTO: 0.02 K/UL
BASOPHILS NFR BLD: 0.7 %
BILIRUB SERPL-MCNC: 1.2 MG/DL
BUN SERPL-MCNC: 63 MG/DL
CALCIUM SERPL-MCNC: 8.7 MG/DL
CHLORIDE SERPL-SCNC: 96 MMOL/L
CO2 SERPL-SCNC: 32 MMOL/L
CREAT SERPL-MCNC: 1 MG/DL
DIFFERENTIAL METHOD: ABNORMAL
EOSINOPHIL # BLD AUTO: 0 K/UL
EOSINOPHIL NFR BLD: 1.5 %
ERYTHROCYTE [DISTWIDTH] IN BLOOD BY AUTOMATED COUNT: 16.4 %
EST. GFR  (AFRICAN AMERICAN): >60 ML/MIN/1.73 M^2
EST. GFR  (NON AFRICAN AMERICAN): 57.6 ML/MIN/1.73 M^2
GLUCOSE SERPL-MCNC: 177 MG/DL
HCT VFR BLD AUTO: 26.1 %
HGB BLD-MCNC: 8 G/DL
IMM GRANULOCYTES # BLD AUTO: 0.08 K/UL
IMM GRANULOCYTES NFR BLD AUTO: 2.9 %
LYMPHOCYTES # BLD AUTO: 0.3 K/UL
LYMPHOCYTES NFR BLD: 12.4 %
MAGNESIUM SERPL-MCNC: 1.2 MG/DL
MCH RBC QN AUTO: 28.3 PG
MCHC RBC AUTO-ENTMCNC: 30.7 G/DL
MCV RBC AUTO: 92 FL
MONOCYTES # BLD AUTO: 0.3 K/UL
MONOCYTES NFR BLD: 11.7 %
NEUTROPHILS # BLD AUTO: 1.9 K/UL
NEUTROPHILS NFR BLD: 70.8 %
NRBC BLD-RTO: 0 /100 WBC
PHOSPHATE SERPL-MCNC: 4.2 MG/DL
PLATELET # BLD AUTO: 108 K/UL
PMV BLD AUTO: 9.7 FL
POCT GLUCOSE: 117 MG/DL (ref 70–110)
POCT GLUCOSE: 177 MG/DL (ref 70–110)
POCT GLUCOSE: 217 MG/DL (ref 70–110)
POCT GLUCOSE: 236 MG/DL (ref 70–110)
POTASSIUM SERPL-SCNC: 3.3 MMOL/L
PROT SERPL-MCNC: 4.7 G/DL
RBC # BLD AUTO: 2.83 M/UL
SODIUM SERPL-SCNC: 139 MMOL/L
TACROLIMUS BLD-MCNC: 2.9 NG/ML
VANCOMYCIN SERPL-MCNC: 11.1 UG/ML
WBC # BLD AUTO: 2.74 K/UL

## 2018-12-02 PROCEDURE — 84100 ASSAY OF PHOSPHORUS: CPT

## 2018-12-02 PROCEDURE — 25000003 PHARM REV CODE 250: Performed by: PHYSICIAN ASSISTANT

## 2018-12-02 PROCEDURE — 94664 DEMO&/EVAL PT USE INHALER: CPT

## 2018-12-02 PROCEDURE — 25000003 PHARM REV CODE 250: Performed by: NURSE PRACTITIONER

## 2018-12-02 PROCEDURE — 63600175 PHARM REV CODE 636 W HCPCS: Performed by: PHYSICIAN ASSISTANT

## 2018-12-02 PROCEDURE — 80197 ASSAY OF TACROLIMUS: CPT

## 2018-12-02 PROCEDURE — 99233 SBSQ HOSP IP/OBS HIGH 50: CPT | Mod: 24,,, | Performed by: NURSE PRACTITIONER

## 2018-12-02 PROCEDURE — 63600175 PHARM REV CODE 636 W HCPCS: Performed by: NURSE PRACTITIONER

## 2018-12-02 PROCEDURE — 25000003 PHARM REV CODE 250: Performed by: TRANSPLANT SURGERY

## 2018-12-02 PROCEDURE — 80202 ASSAY OF VANCOMYCIN: CPT

## 2018-12-02 PROCEDURE — 85025 COMPLETE CBC W/AUTO DIFF WBC: CPT

## 2018-12-02 PROCEDURE — 94761 N-INVAS EAR/PLS OXIMETRY MLT: CPT

## 2018-12-02 PROCEDURE — 83735 ASSAY OF MAGNESIUM: CPT

## 2018-12-02 PROCEDURE — 36415 COLL VENOUS BLD VENIPUNCTURE: CPT

## 2018-12-02 PROCEDURE — 20600001 HC STEP DOWN PRIVATE ROOM

## 2018-12-02 PROCEDURE — 80053 COMPREHEN METABOLIC PANEL: CPT

## 2018-12-02 PROCEDURE — 99900035 HC TECH TIME PER 15 MIN (STAT)

## 2018-12-02 PROCEDURE — 99233 SBSQ HOSP IP/OBS HIGH 50: CPT | Mod: ,,, | Performed by: INTERNAL MEDICINE

## 2018-12-02 RX ORDER — POTASSIUM CHLORIDE 20 MEQ/1
20 TABLET, EXTENDED RELEASE ORAL ONCE
Status: DISCONTINUED | OUTPATIENT
Start: 2018-12-02 | End: 2018-12-02

## 2018-12-02 RX ADMIN — TACROLIMUS 1.5 MG: 1 CAPSULE ORAL at 05:12

## 2018-12-02 RX ADMIN — INSULIN ASPART 1 UNITS: 100 INJECTION, SOLUTION INTRAVENOUS; SUBCUTANEOUS at 08:12

## 2018-12-02 RX ADMIN — DIBASIC SODIUM PHOSPHATE, MONOBASIC POTASSIUM PHOSPHATE AND MONOBASIC SODIUM PHOSPHATE 2 TABLET: 852; 155; 130 TABLET ORAL at 07:12

## 2018-12-02 RX ADMIN — FUROSEMIDE 40 MG: 40 TABLET ORAL at 05:12

## 2018-12-02 RX ADMIN — VANCOMYCIN HYDROCHLORIDE 750 MG: 750 INJECTION, POWDER, LYOPHILIZED, FOR SOLUTION INTRAVENOUS at 02:12

## 2018-12-02 RX ADMIN — INSULIN ASPART 7 UNITS: 100 INJECTION, SOLUTION INTRAVENOUS; SUBCUTANEOUS at 08:12

## 2018-12-02 RX ADMIN — PREDNISONE 2.5 MG: 2.5 TABLET ORAL at 08:12

## 2018-12-02 RX ADMIN — PANTOPRAZOLE SODIUM 40 MG: 40 TABLET, DELAYED RELEASE ORAL at 08:12

## 2018-12-02 RX ADMIN — POTASSIUM BICARBONATE 25 MEQ: 25 TABLET, EFFERVESCENT ORAL at 10:12

## 2018-12-02 RX ADMIN — HEPARIN SODIUM 5000 UNITS: 5000 INJECTION, SOLUTION INTRAVENOUS; SUBCUTANEOUS at 07:12

## 2018-12-02 RX ADMIN — METOPROLOL TARTRATE 25 MG: 25 TABLET ORAL at 08:12

## 2018-12-02 RX ADMIN — PANTOPRAZOLE SODIUM 40 MG: 40 TABLET, DELAYED RELEASE ORAL at 07:12

## 2018-12-02 RX ADMIN — INSULIN ASPART 4 UNITS: 100 INJECTION, SOLUTION INTRAVENOUS; SUBCUTANEOUS at 05:12

## 2018-12-02 RX ADMIN — METOPROLOL TARTRATE 25 MG: 25 TABLET ORAL at 07:12

## 2018-12-02 RX ADMIN — HEPARIN SODIUM 5000 UNITS: 5000 INJECTION, SOLUTION INTRAVENOUS; SUBCUTANEOUS at 06:12

## 2018-12-02 RX ADMIN — ASPIRIN 81 MG CHEWABLE TABLET 81 MG: 81 TABLET CHEWABLE at 08:12

## 2018-12-02 RX ADMIN — HEPARIN SODIUM 5000 UNITS: 5000 INJECTION, SOLUTION INTRAVENOUS; SUBCUTANEOUS at 02:12

## 2018-12-02 RX ADMIN — MAGNESIUM OXIDE TAB 400 MG (241.3 MG ELEMENTAL MG) 800 MG: 400 (241.3 MG) TAB at 08:12

## 2018-12-02 RX ADMIN — FUROSEMIDE 40 MG: 40 TABLET ORAL at 08:12

## 2018-12-02 RX ADMIN — INSULIN ASPART 4 UNITS: 100 INJECTION, SOLUTION INTRAVENOUS; SUBCUTANEOUS at 08:12

## 2018-12-02 RX ADMIN — INSULIN ASPART 14 UNITS: 100 INJECTION, SOLUTION INTRAVENOUS; SUBCUTANEOUS at 01:12

## 2018-12-02 RX ADMIN — ATOVAQUONE 1500 MG: 750 SUSPENSION ORAL at 08:12

## 2018-12-02 RX ADMIN — MAGNESIUM OXIDE TAB 400 MG (241.3 MG ELEMENTAL MG) 800 MG: 400 (241.3 MG) TAB at 07:12

## 2018-12-02 RX ADMIN — ENTECAVIR 0.5 MG: 0.5 TABLET ORAL at 08:12

## 2018-12-02 RX ADMIN — DIBASIC SODIUM PHOSPHATE, MONOBASIC POTASSIUM PHOSPHATE AND MONOBASIC SODIUM PHOSPHATE 2 TABLET: 852; 155; 130 TABLET ORAL at 08:12

## 2018-12-02 RX ADMIN — TACROLIMUS 1 MG: 1 CAPSULE ORAL at 08:12

## 2018-12-02 NOTE — ASSESSMENT & PLAN NOTE
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   - POD 1 US: increased arterial resistive indices, suggestive of edema vs acute rejection vs congestion.  - Repeat US 10/9 with continued elevation of RIs.  - LFTs stable.  - T bili trending down.   - Repeat liver US (10/11): elevated RIs.  - Liver US 11/6 to assess for ascites. No arterial flow within the liver allograft concerning for interval thrombosis/occlusion of the arterial system. Severe ascites seen.  - Liver US 11/8: arterial flow seen w/ elevated RIs   - LFTs remain stable. No CTA at this time due to EVA and normal liver function.   - Liver US 11/15 showed "abnormally decreased main hepatic artery peak systolic velocity with slow arterial flow and abnormal tardus parvus waveforms intrahepatically."   -Vascular surgery consulted  - Liver US 11/19 showed no arterial flow on the right and sluggish flow on the left.   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 No further intervention planned at this time as patient's LFTs are stable. Will monitor.  - CT abd/pelvis 11/26 showed: 1. Persistent large volume of ascites. 2. Large right and moderate left pleural effusions with adjacent basilar atelectasis. 3. Anasarca  - IR placed perc drain 11/29 due to suspicion for chyle leak. 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    - Fluid also growing enterococcus and will plan for treatment with Vanc per ID recs.     "

## 2018-12-02 NOTE — ASSESSMENT & PLAN NOTE
- Dietary consulted.   - Temporal and distal extremity muscle wasting and edema.  - Encourage supplements and to increase nutritional intake.  - Per nephrology, pt to be on low protein diet.  - Prealbumin reviewed.   - Tolerating diet.  Denies n/v.    -Told pt it's ok to resume outside protein shakes as renal fx stable, but will monitor closely.  - Attempted low fat, high protein diet due to suspicion for chyle leak.  - Will need watch closely for improvement.

## 2018-12-02 NOTE — ASSESSMENT & PLAN NOTE
- Fluctuating H&H.    - Transfusions: 10/14; 10/16; 10/18; 10/19; 10/22; 10/27; 10/29, 10/31, 11/3, 11/21, 11/29  - FOBT +  - LDH elevated, Hapto < 10.  - EGD 10/18: ulcerated duodenal mucosa.   - Consulted GI about this ongoing issue and further need of another EGD.  - EGD 11/5 unremarkable.   - Iron studies 11/13:  95, TIBC 107, sat iron 89, transferrin 72, ferritin pending  - Hemolytic anemia workup 11/13: Haptoglobin 132, retic 3.9   - H/H 7/22.6 11/29 - transfused 1u PRBC with good response.  - Continue to monitor.

## 2018-12-02 NOTE — ASSESSMENT & PLAN NOTE
"- Creatinine on arrival 0.8 and trended up.  - Nephrology consulted.  - Renal US (10/11): no hydronephrosis.  - Echo 10/12: diastolic dysfunction, likely 2/2 to ARF.  - CRRT 10/14; SLED 10/17, 10/20. --> no improvements noted in kidney fxn.  - 24 hr urine creatine collection: CrCl of 7.   - CRRT 10/31 with 1L removed.   - Cr level now with improvement.   - See "hypervolemia associated with renal insufficiency"  "

## 2018-12-02 NOTE — ASSESSMENT & PLAN NOTE
- 69 y/o female with PMHx Hepatitis B, D cirrhosis.   - Admitted 10/1/18 underwent liver transplant 10/5/18 CMV D+/R+ steroid induction on maintenance MM/tacro/pred.  - now with possible Chyle leak fluid with milky appearance predominately lymphocytes 175 WBC 18% segs.   - cultures from sample growin GPCs that resulted as Enterococcus pending final ID and sensitivities   - Recommend at this time to continue Vanc pending final result will then tailor further.    Recommendations   - continue vancomycin   - will adjust management depending final culture results

## 2018-12-02 NOTE — PROGRESS NOTES
[facility]  Liver Transplant  Progress Note    Patient Name: Scarlett Reddy  MRN: [id number]  Admission Date: 10/1/2018  Hospital Length of Stay: 61 days  Code Status: Full Code  Primary Care Provider: Primary Doctor No  Post-Operative Day: 58    ORGAN:   LIVER  Disease Etiology: Cirrhosis: Type B and D  Donor Type:    - Brain Death  CDC High Risk:   No  Donor CMV Status:   Donor CMV Status: Positive  Donor HBcAB:   Negative  Donor HCV Status:   Negative  Donor HBV SETH: Negative  Donor HCV SETH: Negative  Whole or Partial: Whole Liver  Biliary Anastomosis: End to End  Arterial Anatomy: Replaced Left Hepatic and Right Hepatic  Subjective:     History of Present Illness:  Ms. Reddy is a 67 y/o female with PMH of ESLD secondary Hep B and D.  Listed for liver transplant with MELD 23.  Paracentesis 10/1 with 5L removed, no fluid sent for analysis.  Morning labs significant for hyponatremia, Na 119.  Pt admitted to LTS for sodium monitoring.        Hospital Course:  Hospital Course: 67 y/o F h/o HBV/delta cirrhosis s/p DBDLT 10/5/18 (CMV D+/R+, steroid induction, MMF/tacro/pred maintenance) with take back on 10/6 2/2 hyperbilirubinema and bilious VALORIE drainage, no leak identified. Post-op course c/b hypercapneic and hypoxic respiratory failure and was reintubated though quickly extubated now doing well. Liver bx (10/6) sig for Zone 3 hemorrhage and collapse, in addition to cholestasis. Transferred from ICU on POD #4. ID consulted to comment on positive diphtheroid culture from ascitic fluid (likely skin colonization) as well as ESBL Klebsiella pneumoniae in urine culture (10/4).  Pt asymptomatic and cell count from ascitic fluid unremarkable. Per ID recs, no need to treat diphtheroids or asymptomatic ESBL Kleb pneumo bacteriuria though pt has had 6 days of therapy which would cover these organisms. Mild peritransplant ascites- repeat US 10/6 with increased arterial resistive indices. Repeat US 10/9  w/ continued elevation in RIs and fluid collections. LFTs trending down nicely.     Acute gradual worsening of renal function s/p OLTx. Creatinine on arrival 0.8 and has been up trending. Nephrology consulted for post-op EVA. Cr cont to trend up but remains to have good UOP. Had stable response to lasiz diuresis.  BUN elevated requiring multiple rounds of dialysis (10/15, 10/17, 10/20, 10/31). No significant improvements in kidney function noted.   In addition, pt H/H cont to fluctuate requiring multiple blood transfusions (10/14, 10/16, 10/18, 10/19, 10/22, 10/27, 10/29).  Pt reported dark stools 10/15 but color has normalized. FOBT+. EGD 10/18 consistent with ulcerated mucosa in duodenum s/p coagulation. Concern for H. Pylori. Started Amoxicillin/levofloxacin (10/19, complete 11/2)). Remains on Protonix 40 mg BID. Biopsy negative for infection and CMV. Will cont close monitoring H/H, transfuse as needed with goal Hb > 7.0. Of note, Urine cx + Klebsiella pneumoniae (10/13). Received 3 day course of ciprofloxacin, dc'd 10/19.   On 10/19 pm pt c/o crushing chest pain. Troponin 0.094, likely 2/2 ischemic stress. EKG NSR.   Liver US 11/6 showed no arterial flow. Paracentesis 11/7 with 8.9 L removed. Wbc 39, segs 16%. Gram stain no organisms seen + no WBC. Pt feels better since having fluid removed but she continues to have worsening abdominal distention & LE edema.Repeat Liver US 11/8 showed arterial flow with increased RIs. Paracentesis 11/12 with 8.6L removed (WBC 34, segs 25%). 11/15 liver u/s showed abnormally decreased main hepatic artery peak systolic velocity with slow arterial flow and abnormal tardus parvus waveforms intrahepatically. Consulted vascular surgery. Repeat liver US 11/19 showed no arterial flow on R and sluggish flow on L. Hepatic angiogram 11/19 confirmed HAT.  Friday 11/16, purulent drainage noted from middle of chevron incision. Area opened and packed. Cx grew klebsiella p. CT scan showed no  signs of infection. On Monday 11/18, it was noted that the right/lateral side of the chevron had 3 small openings draining purulent fluid. Opened the area and packed. Vac placed on 11/24. Wound care following.    Interval history:  No acute events overnight. Pt reports feeling well this am. Pt attempted low fat diet but drain still appears chylous in nature. Will watch drain closely for improvement in chylous leak. Some mild back noted but relief obtained with tylenol. Continue Lasix 40 mg BID. Patient finished day 7 of 7 of Ertapenem for kleb pneumo ESBL UTI. Of note, pt grew same bacteria from wound 11/16. Peritoneal fluid 11/29 w/ enterococcus. Per ID will treat with Vanc at this time. Monitor.     Scheduled Meds:   aspirin  81 mg Oral Daily    atovaquone  1,500 mg Oral Daily    entecavir  0.5 mg Oral Daily    furosemide  40 mg Oral BID    heparin (porcine)  5,000 Units Subcutaneous Q8H    insulin aspart U-100  7-14 Units Subcutaneous TIDWM    insulin detemir U-100  10 Units Subcutaneous Daily    k phos di & mono-sod phos mono  2 tablet Oral BID    magnesium oxide  800 mg Oral BID    metoprolol tartrate  25 mg Oral BID    pantoprazole  40 mg Oral BID    potassium chloride  20 mEq Oral Once    predniSONE  2.5 mg Oral Daily    tacrolimus  1 mg Oral BID    vancomycin (VANCOCIN) IVPB  750 mg Intravenous Q24H     Continuous Infusions:  PRN Meds:sodium chloride, acetaminophen, bisacodyl, calcium carbonate, dextrose 50%, dextrose 50%, glucagon (human recombinant), glucose, glucose, insulin aspart U-100, omnipaque, ondansetron, ondansetron, polyethylene glycol, sodium chloride 0.9%, traMADol    Review of Systems   Constitutional: Positive for activity change and appetite change (improving). Negative for chills, fatigue and fever.   HENT: Negative for congestion, facial swelling and trouble swallowing.    Eyes: Negative for pain, discharge and visual disturbance.   Respiratory: Negative for cough, chest  tightness, shortness of breath and wheezing.    Cardiovascular: Positive for leg swelling. Negative for chest pain and palpitations.   Gastrointestinal: Positive for abdominal distention and abdominal pain. Negative for constipation, diarrhea, nausea and vomiting.   Endocrine: Negative.    Genitourinary: Negative for decreased urine volume, difficulty urinating and dysuria.   Musculoskeletal: Positive for back pain and neck pain. Negative for arthralgias and neck stiffness.   Skin: Positive for wound.   Allergic/Immunologic: Positive for immunocompromised state.   Neurological: Positive for weakness. Negative for tremors and headaches.   Psychiatric/Behavioral: Negative for confusion and sleep disturbance.     Objective:     Vital Signs (Most Recent):  Temp: 97.9 °F (36.6 °C) (12/01/18 1651)  Pulse: 110 (12/02/18 0725)  Resp: 19 (12/02/18 0725)  BP: (!) 142/82 (12/02/18 0725)  SpO2: (!) 93 % (12/02/18 0725) Vital Signs (24h Range):  Temp:  [97.9 °F (36.6 °C)-98.1 °F (36.7 °C)] 97.9 °F (36.6 °C)  Pulse:  [100-112] 110  Resp:  [14-21] 19  SpO2:  [92 %-97 %] 93 %  BP: (106-142)/(65-82) 142/82     Weight: 58 kg (127 lb 13.9 oz)  Body mass index is 22.65 kg/m².    Intake/Output - Last 3 Shifts       11/30 0700 - 12/01 0659 12/01 0700 - 12/02 0659 12/02 0700 - 12/03 0659    P.O. 1180 470     I.V. (mL/kg) 50 (1) 0 (0)     Blood       Other  0     IV Piggyback 250 250     Total Intake(mL/kg) 1480 (28.5) 720 (12.4)     Urine (mL/kg/hr) 1300 (1) 1400 (1)     Emesis/NG output 0 0     Drains 440 550     Other 220 0     Stool 0 0     Blood 0 0     Total Output 1960 1950     Net -480 -1230            Urine Occurrence 5 x 3 x     Stool Occurrence 1 x 5 x     Emesis Occurrence 0 x 0 x           Physical Exam   Constitutional: She is oriented to person, place, and time. She appears well-developed. No distress.   Temporal and distal extremity muscle wasting   HENT:   Head: Normocephalic and atraumatic.   Eyes: EOM are normal. No  scleral icterus.   Neck: Normal range of motion.   Cardiovascular: Normal rate, regular rhythm and normal heart sounds.   No murmur heard.  Pulmonary/Chest: Effort normal. No respiratory distress. She has no wheezes.   Breath sounds decreased at bases   Abdominal: Soft. Bowel sounds are normal. She exhibits distension. There is tenderness. There is no guarding.   Wound vac placed to middle chevron incision + R lateral side, no s/s/i  Erythema + induration RLQ, improving  RLQ VALORIE drain sites c/d/i with sutures   Musculoskeletal: Normal range of motion. She exhibits edema (3+ BLE, + generalized).   Neurological: She is alert and oriented to person, place, and time.   Skin: Skin is warm. She is not diaphoretic.   Psychiatric: She has a normal mood and affect.   Nursing note and vitals reviewed.      Laboratory:  Immunosuppressants         Stop Route Frequency     tacrolimus capsule 1 mg      -- Oral 2 times daily        CBC:   Recent Labs   Lab 12/02/18  0644   WBC 2.74*   RBC 2.83*   HGB 8.0*   HCT 26.1*   *   MCV 92   MCH 28.3   MCHC 30.7*     CMP:   Recent Labs   Lab 12/02/18  0644   *   CALCIUM 8.7   ALBUMIN 2.6*   PROT 4.7*      K 3.3*   CO2 32*   CL 96   BUN 63*   CREATININE 1.0   ALKPHOS 122   ALT 9*   AST 18   BILITOT 1.2*     Labs within the past 24 hours have been reviewed.    Diagnostic Results:  I have personally reviewed all pertinent imaging studies.Scheduled Meds:   aspirin  81 mg Oral Daily    atovaquone  1,500 mg Oral Daily    entecavir  0.5 mg Oral Daily    furosemide  40 mg Oral BID    heparin (porcine)  5,000 Units Subcutaneous Q8H    insulin aspart U-100  7-14 Units Subcutaneous TIDWM    insulin detemir U-100  10 Units Subcutaneous Daily    k phos di & mono-sod phos mono  2 tablet Oral BID    magnesium oxide  800 mg Oral BID    metoprolol tartrate  25 mg Oral BID    pantoprazole  40 mg Oral BID    potassium chloride  20 mEq Oral Once    predniSONE  2.5 mg Oral Daily     tacrolimus  1 mg Oral BID    vancomycin (VANCOCIN) IVPB  750 mg Intravenous Q24H     Continuous Infusions:  PRN Meds:sodium chloride, acetaminophen, bisacodyl, calcium carbonate, dextrose 50%, dextrose 50%, glucagon (human recombinant), glucose, glucose, insulin aspart U-100, omnipaque, ondansetron, ondansetron, polyethylene glycol, sodium chloride 0.9%, traMADol    Review of Systems    Objective:     Vital Signs (Most Recent):  Temp: 97.9 °F (36.6 °C) (12/01/18 1651)  Pulse: 110 (12/02/18 0725)  Resp: 19 (12/02/18 0725)  BP: (!) 142/82 (12/02/18 0725)  SpO2: (!) 93 % (12/02/18 0725) Vital Signs (24h Range):  Temp:  [97.9 °F (36.6 °C)-98.1 °F (36.7 °C)] 97.9 °F (36.6 °C)  Pulse:  [100-112] 110  Resp:  [14-21] 19  SpO2:  [92 %-97 %] 93 %  BP: (106-142)/(65-82) 142/82     Weight: 58 kg (127 lb 13.9 oz)  Body mass index is 22.65 kg/m².    Intake/Output - Last 3 Shifts       11/30 0700 - 12/01 0659 12/01 0700 - 12/02 0659 12/02 0700 - 12/03 0659    P.O. 1180 470     I.V. (mL/kg) 50 (1) 0 (0)     Blood       Other  0     IV Piggyback 250 250     Total Intake(mL/kg) 1480 (28.5) 720 (12.4)     Urine (mL/kg/hr) 1300 (1) 1400 (1)     Emesis/NG output 0 0     Drains 440 550     Other 220 0     Stool 0 0     Blood 0 0     Total Output 1960 1950     Net -480 -1230            Urine Occurrence 5 x 3 x     Stool Occurrence 1 x 5 x     Emesis Occurrence 0 x 0 x           Physical Exam      Laboratory:  Immunosuppressants         Stop Route Frequency     tacrolimus capsule 1 mg      -- Oral 2 times daily        CBC:   Recent Labs   Lab 12/02/18  0644   WBC 2.74*   RBC 2.83*   HGB 8.0*   HCT 26.1*   *   MCV 92   MCH 28.3   MCHC 30.7*     CMP:   Recent Labs   Lab 12/02/18  0644   *   CALCIUM 8.7   ALBUMIN 2.6*   PROT 4.7*      K 3.3*   CO2 32*   CL 96   BUN 63*   CREATININE 1.0   ALKPHOS 122   ALT 9*   AST 18   BILITOT 1.2*     Coagulation: No results for input(s): PT, INR, APTT in the last 168 hours.  Labs  "within the past 24 hours have been reviewed.    Diagnostic Results:  I have personally reviewed all pertinent imaging studies.    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   - POD 1 US: increased arterial resistive indices, suggestive of edema vs acute rejection vs congestion.  - Repeat US 10/9 with continued elevation of RIs.  - LFTs stable.  - T bili trending down.   - Repeat liver US (10/11): elevated RIs.  - Liver US 11/6 to assess for ascites. No arterial flow within the liver allograft concerning for interval thrombosis/occlusion of the arterial system. Severe ascites seen.  - Liver US 11/8: arterial flow seen w/ elevated RIs   - LFTs remain stable. No CTA at this time due to EVA and normal liver function.   - Liver US 11/15 showed "abnormally decreased main hepatic artery peak systolic velocity with slow arterial flow and abnormal tardus parvus waveforms intrahepatically."   -Vascular surgery consulted  - Liver US 11/19 showed no arterial flow on the right and sluggish flow on the left.   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 No further intervention planned at this time as patient's LFTs are stable. Will monitor.  - CT abd/pelvis 11/26 showed: 1. Persistent large volume of ascites. 2. Large right and moderate left pleural effusions with adjacent basilar atelectasis. 3. Anasarca  - IR placed perc drain 11/29 due to suspicion for chyle leak. 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    - Fluid also growing enterococcus and will plan for treatment with Vanc per ID recs.        Tachycardia    - Pt w/ tachycardia but now improved.   - Metoprolol 12.5 mg BID for rate control. HR improved, but still elevated. BP stable.  - 11/30 -> increased Metoprolol to 25 mg BID  - Monitor closely.     " "  Pleural effusion    - Common post liver transplant  - CT 11/26 showed: Large right and moderate left pleural effusions with adjacent basilar atelectasis.  - Pt sp02 stable on RA, but c/o some orthopnea.  - Continue w/ diuresis.       Chronic kidney disease, stage III (moderate)    - Kidney fx improving  - Monitor       Hypomagnesemia    - Continue PO Mag Oxide  - IV Mag 2g 11/29 for Mag 1.3 + 2g 11/30 for Mag 1.5  - Continue to monitor.       Hypokalemia    - Replace as needed.   - Continue to monitor.     Hypophosphatemia    - Continue k phos and monitor closely.       Delayed surgical wound healing    - Middle chevron incision opened 11/16 with purulent drainage  - Packed with Aquacel ag at this time. Growing Klebsiella p.   - CT abd/pelvis 11/17- no signs of infection  - 11/17: R side of chevron incision w/ 3 small holes draining purulent fluid, opened and packed w/ aquacel.   - Wound care following.  - Wound w/ increased drainage, so wound care placed vac 11/24/18.  Apprec recs. Extending Ertapenem thru 11/28 per ID.    - Abdomen erythematous and indurated RLQ inferior to chevron.  - CT abd/pelvis 11/26 - no sign of infection  - Last wound vac change 11/30.    .       Hepatic artery thrombosis of transplanted liver    - Liver US 11/19 showed no arterial flow to R and sluggish flow to L  - Hepatic angiogram 11/19 confirmed HAT  - No further intervention planned at this time. Will continue to monitor.     Drug-induced pancytopenia    - Decreased plt, wbc, h/h  - See "leukopenia"  - Continue to monitor.     Thrombocytopenia, unspecified    - Monitor.       Alteration in skin integrity    - Monitor.       Leukopenia    - Stopped Cellcept, Bactrim, and Valcyte 10/31   - Cellcept half dose resumed 11/6  - Started Atovaquone 11/6.   - CMV PCR noted to be positive at 129. Will hold off on treatment with valcyte at this time. Monitoring weekly CMV PCRs.  - Cellcept held 11/23 2/2 infection (UTI + wound)  - Continue to " "monitor.       Azotemia    - CRRT 10/31 for uremic toxin clearance.           UTI (urinary tract infection)    - Urine Cx 10/15 w/ Klebsiella pneumonia.  - Started Ciprofloxacin (10/17-10/19).   - Started on amoxicillin/levo for H. Pylori (completed abx 11/2).  - Pt c/o lower back pain 11/22. UA w/ trace leuks, moderate wbs, 1+ blood, cx pending  - Pt symptomatic (back pain) & with ESBL in wound previously.  Ertapenem 1g QD x 3 days (started 11/23), per ID recs.    - Given pt cont to have back pain and concern for infection in wound ( thick drainage and increased drainage)-  extended Ertapenem for 4 more days per ID (stop date 11/29/18).         Acute blood loss anemia    - Expected post transplant  - H&H cont to fluctuate.   - FOBT +.  - EGD 10/18 - showed diffuse bleeding with ulcerated duodenal mucosa.  - EGD 11/5 - improvement seen, no active bleeding.  - Resume heparin sq injections 11/6.   - Decrease heparin sq from TID to BID 11/15   - Heparin dc'd 11/17  - See "anemia requiring transfusions."  - Continue to monitor.             Post LT - Acute renal failure    - Creatinine on arrival 0.8 and trended up.  - Nephrology consulted.  - Renal US (10/11): no hydronephrosis.  - Echo 10/12: diastolic dysfunction, likely 2/2 to ARF.  - CRRT 10/14; SLED 10/17, 10/20. --> no improvements noted in kidney fxn.  - 24 hr urine creatine collection: CrCl of 7.   - CRRT 10/31 with 1L removed.   - Cr level now with improvement.   - See "hypervolemia associated with renal insufficiency"     Hypervolemia associated with renal insufficiency    - CRRT 10/14, 10/17, 10/20, 10/31.  - CXR 10/30: abundant fluid in b/l pleural space.  - Continue 40 mg Lasix BID.  - Monitor daily weights.       EVA (acute kidney injury)    - No plans for RRT in near future, per nephrology  - Trialysis line removed 11/13  - Nephrology involved. Appreciate recs.  - Cr improved & stable.  - Continue to monitor.     At risk for opportunistic infections    - " Valcyte for CMV prophylaxis--> holding (10/31) for leukopenia. Pt will need weekly CMV PCR while discontinued.  - CMV PCR noted to be positive at 129. .  - CMV PCR 11/8: 105  - CMV PCR 11/15 with 89 copies  - CMV PCR 11/23 95 - increased from previous. Continue to hold Valcyte & monitor pt closely.  - CMV PCR 11/29 146  - Bactrim for PCP prophylaxis (MW).--> holding (10/31) for leukopenia.   - Start Atovaquone 11/6.  - Fluconazole for fungal prophylaxis (first dose 10/6). D/c Fluc 11/16 bc pt had been on it for > 30 days post take back.       Long-term use of immunosuppressant medication    - Prograf: stopped 10/29. Resumed 11/2.  --> Will monitor for signs of toxic side effects, check daily troughs, and change meds accordingly.   - Prednisone: stopped 10/29. Started on Hydrocortisone. pred taper resumed.  - Cellcept: stopped 10/31 for leukopenia. Restarted Cellcept 500 mg bid, 11/6.  - Cellcept held 11/23 2/2 infection         Prophylactic immunotherapy    - See long term use immunosuppresion.       Chylous ascites    - Paracentesis 10/1, 5L removed, no fluid sent for analysis.  - Pt remains volume overloaded.   - Liver US 11/6 with severe ascites.   - Paracentesis 11/7 with 8.9 L removed. Wbc 39, segs 16%. Gram stain - no organisms seen and no WBC. Cultures NGTD.  - Paracentesis 11/12: 8.6 L removed. 34 wbc, 25% segs, 33 lymphs, 42 monocytes/macrophages. Cultures NGTD  - Paracentesis 11/21 with 4 L removed, neg for infection.  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 Appearance and cell count of fluid (51% lymphs) consistent with chyle leak. Triglyceride level pending.   - Peritoneal fluid 11/29 growing enterococcus and will plan for treatment with Vanc per ID recs.   - Low fat diet attempted but no improvement in chylous leak noted. Will watch closely.   - Discuss possible NPO and TPN nutrition.  - Plan for abdominal CT Monday to assess ascites.      Weakness    - See physical  deconditioning.       Physical deconditioning    - PT/OT consulted.  Progressing well w/ therapy.  - Recommend home health PT and rolling walker at discharge (orders placed 11/6).       Hypotension    - d/c'd midodrine 11/28 as patient has not required it recently  - Continue to monitor.     Anemia requiring transfusions    - Fluctuating H&H.    - Transfusions: 10/14; 10/16; 10/18; 10/19; 10/22; 10/27; 10/29, 10/31, 11/3, 11/21, 11/29  - FOBT +  - LDH elevated, Hapto < 10.  - EGD 10/18: ulcerated duodenal mucosa.   - Consulted GI about this ongoing issue and further need of another EGD.  - EGD 11/5 unremarkable.   - Iron studies 11/13:  95, TIBC 107, sat iron 89, transferrin 72, ferritin pending  - Hemolytic anemia workup 11/13: Haptoglobin 132, retic 3.9   - H/H 7/22.6 11/29 - transfused 1u PRBC with good response.  - Continue to monitor.           Severe malnutrition    - Dietary consulted.   - Temporal and distal extremity muscle wasting and edema.  - Encourage supplements and to increase nutritional intake.  - Per nephrology, pt to be on low protein diet.  - Prealbumin reviewed.   - Tolerating diet.  Denies n/v.    -Told pt it's ok to resume outside protein shakes as renal fx stable, but will monitor closely.  - Attempted low fat, high protein diet due to suspicion for chyle leak.  - Will need watch closely for improvement.          Type 2 diabetes mellitus with diabetic polyneuropathy, with long-term current use of insulin    - Continue home regimen.  - Endocrine consulted.  - Pt off insulin drip at this time.  Apprec endo recs.        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  - Tx w/ Entecavir- dose changed to daily 11/26 due to improved renal fx             VTE Risk Mitigation (From admission, onward)        Ordered     heparin (porcine) injection 5,000 Units  Every 8 hours      11/22/18 1014     IP VTE HIGH RISK PATIENT  Once      11/05/18  1145     Place sequential compression device  Until discontinued      10/05/18 0709          The patients clinical status was discussed at multidisplinary rounds, involving transplant surgery, transplant medicine, pharmacy, nursing, nutrition, and social work    Discharge Planning:  Not a candidate for dc at this time.    Fred Cruz, NP  Liver Transplant  [facility]

## 2018-12-02 NOTE — ASSESSMENT & PLAN NOTE
- Paracentesis 10/1, 5L removed, no fluid sent for analysis.  - Pt remains volume overloaded.   - Liver US 11/6 with severe ascites.   - Paracentesis 11/7 with 8.9 L removed. Wbc 39, segs 16%. Gram stain - no organisms seen and no WBC. Cultures NGTD.  - Paracentesis 11/12: 8.6 L removed. 34 wbc, 25% segs, 33 lymphs, 42 monocytes/macrophages. Cultures NGTD  - Paracentesis 11/21 with 4 L removed, neg for infection.  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 Appearance and cell count of fluid (51% lymphs) consistent with chyle leak. Triglyceride level pending.   - Peritoneal fluid 11/29 growing enterococcus and will plan for treatment with Vanc per ID recs.   - Low fat diet attempted but no improvement in chylous leak noted. Will watch closely.   - Discuss possible NPO and TPN nutrition.  - Plan for abdominal CT Monday to assess ascites.

## 2018-12-02 NOTE — ASSESSMENT & PLAN NOTE
- Urine Cx 10/15 w/ Klebsiella pneumonia.  - Started Ciprofloxacin (10/17-10/19).   - Started on amoxicillin/levo for H. Pylori (completed abx 11/2).  - Pt c/o lower back pain 11/22. UA w/ trace leuks, moderate wbs, 1+ blood, cx pending  - Pt symptomatic (back pain) & with ESBL in wound previously.  Ertapenem 1g QD x 3 days (started 11/23), per ID recs.    - Given pt cont to have back pain and concern for infection in wound ( thick drainage and increased drainage)-  extended Ertapenem for 4 more days per ID (stop date 11/29/18).

## 2018-12-02 NOTE — ASSESSMENT & PLAN NOTE
- Pt w/ tachycardia but now improved.   - Metoprolol 12.5 mg BID for rate control. HR improved, but still elevated. BP stable.  - 11/30 -> increased Metoprolol to 25 mg BID  - Monitor closely.

## 2018-12-02 NOTE — PROGRESS NOTES
[facility]  Infectious Disease  Progress Note    Patient Name: Scarlett Reddy  MRN: [id number]  Admission Date: 10/1/2018  Length of Stay: 61 days  Attending Physician: Jani Theodore MD  Primary Care Provider: Primary Doctor No    Isolation Status: Contact  Assessment/Plan:      Other ascites    - 69 y/o female with PMHx Hepatitis B, D cirrhosis.   - Admitted 10/1/18 underwent liver transplant 10/5/18 CMV D+/R+ steroid induction on maintenance MM/tacro/pred.  - now with possible Chyle leak fluid with milky appearance predominately lymphocytes 175 WBC 18% segs.   - cultures from sample growin GPCs that resulted as Enterococcus pending final ID and sensitivities   - Recommend at this time to continue Vanc pending final result will then tailor further.    Recommendations   - continue vancomycin   - will adjust management depending final culture results             Anticipated Disposition:     Thank you for your consult. I will follow-up with patient. Please contact us if you have any additional questions.    Lisa Xavier MD  Infectious Disease  [facility]    Subjective:     Principal Problem:Liver transplanted    HPI: Case of 69 y/o female with PMHx Hepatitis B, D cirrhosis. Admitted 10/1/18 underwent liver transplant 10/5/18 CMV D+/R+ steroid induction on maintenance MM/tacro/pred. She was taken back to the OR on 10/6 due to increasing LFT's and suspected biliary leak.  On revision no leak was identified. Patient has been on pip/tazo to cover possible infections and on ppx therapy with bactrim, fluconazole, valgancyclovir; on entecavir for hepatitis B. ID consulted to comment on ESBL Kelb UTI and diphtheroids from ascitic fluid.    Interval History: ID called back due to positive culture from chyle leak growing GPCs resembling Strep     Review of Systems   Constitutional: Negative for chills, fatigue and fever.   Gastrointestinal: Positive for abdominal distention and  abdominal pain. Negative for diarrhea, nausea and vomiting.     Objective:     Vital Signs (Most Recent):  Temp: 99.8 °F (37.7 °C) (12/02/18 1035)  Pulse: 110 (12/02/18 1151)  Resp: (!) 21 (12/02/18 1151)  BP: 125/69 (12/02/18 1151)  SpO2: 96 % (12/02/18 1151) Vital Signs (24h Range):  Temp:  [97.9 °F (36.6 °C)-99.8 °F (37.7 °C)] 99.8 °F (37.7 °C)  Pulse:  [100-112] 110  Resp:  [14-21] 21  SpO2:  [92 %-97 %] 96 %  BP: (106-142)/(65-82) 125/69     Weight: 58 kg (127 lb 13.9 oz)  Body mass index is 22.65 kg/m².    Estimated Creatinine Clearance: 43.9 mL/min (based on SCr of 1 mg/dL).    Physical Exam   Constitutional: She is oriented to person, place, and time. She appears well-developed and well-nourished.   HENT:   Head: Normocephalic and atraumatic.   Eyes: EOM are normal. Pupils are equal, round, and reactive to light.   Neck: Normal range of motion.   Cardiovascular: Normal rate, regular rhythm and normal heart sounds.   Pulmonary/Chest: Effort normal and breath sounds normal.   Abdominal: Soft. Bowel sounds are normal.   Musculoskeletal: Normal range of motion. She exhibits no edema.   Neurological: She is alert and oriented to person, place, and time.   Skin: No rash noted.       Significant Labs:   Blood Culture:   Recent Labs   Lab 09/17/18  1050 09/17/18  1200 10/15/18  1332 10/16/18  1839 10/16/18  1907   LABBLOO No growth after 5 days. No growth after 5 days. No growth after 5 days. No growth after 5 days. No growth after 5 days.     BMP:   Recent Labs   Lab 12/02/18  0644   *      K 3.3*   CL 96   CO2 32*   BUN 63*   CREATININE 1.0   CALCIUM 8.7   MG 1.2*     CBC:   Recent Labs   Lab 12/01/18  0731 12/02/18  0644   WBC 2.94* 2.74*   HGB 8.6* 8.0*   HCT 27.2* 26.1*   * 108*     CMP:   Recent Labs   Lab 12/01/18  0731 12/02/18  0644    139   K 3.9 3.3*   CL 98 96   CO2 30* 32*   * 177*   BUN 61* 63*   CREATININE 1.1 1.0   CALCIUM 8.7 8.7   PROT 4.9* 4.7*   ALBUMIN 2.6* 2.6*    BILITOT 1.2* 1.2*   ALKPHOS 130 122   AST 17 18   ALT 9* 9*   ANIONGAP 11 11   EGFRNONAA 51.3* 57.6*     Microbiology Results (last 7 days)     Procedure Component Value Units Date/Time    Culture, Body Fluid - Bactec [[id number]] Collected:  11/29/18 1229    Order Status:  Completed Specimen:  Body Fluid from Peritoneal Fluid Updated:  12/02/18 0933     Body Fluid Culture, Sterile Gram stain: Gram positive cocci in chains resembling Strep     Body Fluid Culture, Sterile --     ENTEROCOCCUS SPECIES  Identification and susceptibility pending      Gram stain [[id number]] Collected:  11/29/18 1229    Order Status:  Completed Specimen:  Body Fluid from Abdomen Updated:  11/29/18 1758     Gram Stain Result Rare WBC's      No organisms seen    Fungus culture [[id number]]     Order Status:  Completed Specimen:  Abdominal from Abdomen     Fungus culture [[id number]] Collected:  11/29/18 1229    Order Status:  Sent Specimen:  Body Fluid from Abdomen Updated:  11/29/18 1351    Culture, Anaerobic [[id number]] Collected:  11/21/18 1545    Order Status:  Completed Specimen:  Body Fluid from Peritoneal Fluid Updated:  11/28/18 0749     Anaerobic Culture No anaerobes isolated    Fungus culture [[id number]] Collected:  11/21/18 1545    Order Status:  Completed Specimen:  Body Fluid from Peritoneal Fluid Updated:  11/27/18 1122     Fungus (Mycology) Culture Culture in progress    Fungus culture [[id number]] Collected:  11/12/18 1542    Order Status:  Completed Specimen:  Ascites from Abdomen Updated:  11/27/18 1024     Fungus (Mycology) Culture Culture in progress     Fungus (Mycology) Culture No fungus isolated after 2 weeks    Fungus culture [[id number]] Collected:  11/07/18 1435    Order Status:  Completed Specimen:  Abdominal from Abdomen Updated:  11/27/18 1001     Fungus (Mycology) Culture Culture in progress     Fungus (Mycology) Culture No fungus isolated after 2 weeks    Stool culture [[id number]] Collected:  11/22/18 7547     Order Status:  Completed Specimen:  Stool Updated:  11/25/18 4502     Stool Culture No Salmonella,Shigella,Vibrio,Campylobacter,Yersinia isolated.          Significant Imaging: I have reviewed all pertinent imaging results/findings within the past 24 hours.

## 2018-12-02 NOTE — SUBJECTIVE & OBJECTIVE
Scheduled Meds:   aspirin  81 mg Oral Daily    atovaquone  1,500 mg Oral Daily    entecavir  0.5 mg Oral Daily    furosemide  40 mg Oral BID    heparin (porcine)  5,000 Units Subcutaneous Q8H    insulin aspart U-100  7-14 Units Subcutaneous TIDWM    insulin detemir U-100  10 Units Subcutaneous Daily    k phos di & mono-sod phos mono  2 tablet Oral BID    magnesium oxide  800 mg Oral BID    metoprolol tartrate  25 mg Oral BID    pantoprazole  40 mg Oral BID    potassium chloride  20 mEq Oral Once    predniSONE  2.5 mg Oral Daily    tacrolimus  1 mg Oral BID    vancomycin (VANCOCIN) IVPB  750 mg Intravenous Q24H     Continuous Infusions:  PRN Meds:sodium chloride, acetaminophen, bisacodyl, calcium carbonate, dextrose 50%, dextrose 50%, glucagon (human recombinant), glucose, glucose, insulin aspart U-100, omnipaque, ondansetron, ondansetron, polyethylene glycol, sodium chloride 0.9%, traMADol    Review of Systems   Constitutional: Positive for activity change and appetite change (improving). Negative for chills, fatigue and fever.   HENT: Negative for congestion, facial swelling and trouble swallowing.    Eyes: Negative for pain, discharge and visual disturbance.   Respiratory: Negative for cough, chest tightness, shortness of breath and wheezing.    Cardiovascular: Positive for leg swelling. Negative for chest pain and palpitations.   Gastrointestinal: Positive for abdominal distention and abdominal pain. Negative for constipation, diarrhea, nausea and vomiting.   Endocrine: Negative.    Genitourinary: Negative for decreased urine volume, difficulty urinating and dysuria.   Musculoskeletal: Positive for back pain and neck pain. Negative for arthralgias and neck stiffness.   Skin: Positive for wound.   Allergic/Immunologic: Positive for immunocompromised state.   Neurological: Positive for weakness. Negative for tremors and headaches.   Psychiatric/Behavioral: Negative for confusion and sleep  disturbance.     Objective:     Vital Signs (Most Recent):  Temp: 97.9 °F (36.6 °C) (12/01/18 1651)  Pulse: 110 (12/02/18 0725)  Resp: 19 (12/02/18 0725)  BP: (!) 142/82 (12/02/18 0725)  SpO2: (!) 93 % (12/02/18 0725) Vital Signs (24h Range):  Temp:  [97.9 °F (36.6 °C)-98.1 °F (36.7 °C)] 97.9 °F (36.6 °C)  Pulse:  [100-112] 110  Resp:  [14-21] 19  SpO2:  [92 %-97 %] 93 %  BP: (106-142)/(65-82) 142/82     Weight: 58 kg (127 lb 13.9 oz)  Body mass index is 22.65 kg/m².    Intake/Output - Last 3 Shifts       11/30 0700 - 12/01 0659 12/01 0700 - 12/02 0659 12/02 0700 - 12/03 0659    P.O. 1180 470     I.V. (mL/kg) 50 (1) 0 (0)     Blood       Other  0     IV Piggyback 250 250     Total Intake(mL/kg) 1480 (28.5) 720 (12.4)     Urine (mL/kg/hr) 1300 (1) 1400 (1)     Emesis/NG output 0 0     Drains 440 550     Other 220 0     Stool 0 0     Blood 0 0     Total Output 1960 1950     Net -480 -1230            Urine Occurrence 5 x 3 x     Stool Occurrence 1 x 5 x     Emesis Occurrence 0 x 0 x           Physical Exam   Constitutional: She is oriented to person, place, and time. She appears well-developed. No distress.   Temporal and distal extremity muscle wasting   HENT:   Head: Normocephalic and atraumatic.   Eyes: EOM are normal. No scleral icterus.   Neck: Normal range of motion.   Cardiovascular: Normal rate, regular rhythm and normal heart sounds.   No murmur heard.  Pulmonary/Chest: Effort normal. No respiratory distress. She has no wheezes.   Breath sounds decreased at bases   Abdominal: Soft. Bowel sounds are normal. She exhibits distension. There is tenderness. There is no guarding.   Wound vac placed to middle chevron incision + R lateral side, no s/s/i  Erythema + induration RLQ, improving  RLQ VALORIE drain sites c/d/i with sutures   Musculoskeletal: Normal range of motion. She exhibits edema (3+ BLE, + generalized).   Neurological: She is alert and oriented to person, place, and time.   Skin: Skin is warm. She is not  diaphoretic.   Psychiatric: She has a normal mood and affect.   Nursing note and vitals reviewed.      Laboratory:  Immunosuppressants         Stop Route Frequency     tacrolimus capsule 1 mg      -- Oral 2 times daily        CBC:   Recent Labs   Lab 12/02/18  0644   WBC 2.74*   RBC 2.83*   HGB 8.0*   HCT 26.1*   *   MCV 92   MCH 28.3   MCHC 30.7*     CMP:   Recent Labs   Lab 12/02/18  0644   *   CALCIUM 8.7   ALBUMIN 2.6*   PROT 4.7*      K 3.3*   CO2 32*   CL 96   BUN 63*   CREATININE 1.0   ALKPHOS 122   ALT 9*   AST 18   BILITOT 1.2*     Labs within the past 24 hours have been reviewed.    Diagnostic Results:  I have personally reviewed all pertinent imaging studies.Scheduled Meds:   aspirin  81 mg Oral Daily    atovaquone  1,500 mg Oral Daily    entecavir  0.5 mg Oral Daily    furosemide  40 mg Oral BID    heparin (porcine)  5,000 Units Subcutaneous Q8H    insulin aspart U-100  7-14 Units Subcutaneous TIDWM    insulin detemir U-100  10 Units Subcutaneous Daily    k phos di & mono-sod phos mono  2 tablet Oral BID    magnesium oxide  800 mg Oral BID    metoprolol tartrate  25 mg Oral BID    pantoprazole  40 mg Oral BID    potassium chloride  20 mEq Oral Once    predniSONE  2.5 mg Oral Daily    tacrolimus  1 mg Oral BID    vancomycin (VANCOCIN) IVPB  750 mg Intravenous Q24H     Continuous Infusions:  PRN Meds:sodium chloride, acetaminophen, bisacodyl, calcium carbonate, dextrose 50%, dextrose 50%, glucagon (human recombinant), glucose, glucose, insulin aspart U-100, omnipaque, ondansetron, ondansetron, polyethylene glycol, sodium chloride 0.9%, traMADol    Review of Systems    Objective:     Vital Signs (Most Recent):  Temp: 97.9 °F (36.6 °C) (12/01/18 1651)  Pulse: 110 (12/02/18 0725)  Resp: 19 (12/02/18 0725)  BP: (!) 142/82 (12/02/18 0725)  SpO2: (!) 93 % (12/02/18 0725) Vital Signs (24h Range):  Temp:  [97.9 °F (36.6 °C)-98.1 °F (36.7 °C)] 97.9 °F (36.6 °C)  Pulse:  [100-112]  110  Resp:  [14-21] 19  SpO2:  [92 %-97 %] 93 %  BP: (106-142)/(65-82) 142/82     Weight: 58 kg (127 lb 13.9 oz)  Body mass index is 22.65 kg/m².    Intake/Output - Last 3 Shifts       11/30 0700 - 12/01 0659 12/01 0700 - 12/02 0659 12/02 0700 - 12/03 0659    P.O. 1180 470     I.V. (mL/kg) 50 (1) 0 (0)     Blood       Other  0     IV Piggyback 250 250     Total Intake(mL/kg) 1480 (28.5) 720 (12.4)     Urine (mL/kg/hr) 1300 (1) 1400 (1)     Emesis/NG output 0 0     Drains 440 550     Other 220 0     Stool 0 0     Blood 0 0     Total Output 1960 1950     Net -480 -1230            Urine Occurrence 5 x 3 x     Stool Occurrence 1 x 5 x     Emesis Occurrence 0 x 0 x           Physical Exam      Laboratory:  Immunosuppressants         Stop Route Frequency     tacrolimus capsule 1 mg      -- Oral 2 times daily        CBC:   Recent Labs   Lab 12/02/18  0644   WBC 2.74*   RBC 2.83*   HGB 8.0*   HCT 26.1*   *   MCV 92   MCH 28.3   MCHC 30.7*     CMP:   Recent Labs   Lab 12/02/18  0644   *   CALCIUM 8.7   ALBUMIN 2.6*   PROT 4.7*      K 3.3*   CO2 32*   CL 96   BUN 63*   CREATININE 1.0   ALKPHOS 122   ALT 9*   AST 18   BILITOT 1.2*     Coagulation: No results for input(s): PT, INR, APTT in the last 168 hours.  Labs within the past 24 hours have been reviewed.    Diagnostic Results:  I have personally reviewed all pertinent imaging studies.

## 2018-12-02 NOTE — PLAN OF CARE
Problem: Patient Care Overview  Goal: Plan of Care Review  Outcome: Ongoing (interventions implemented as appropriate)  AOx4, VSS, denies pain. Accuchecks ACHS - no coverage needed. . Emptied VALORIE Q4. Output ranged from . Wound vac w/ no output this shift. Wound dressing remains CDI. Remains free from falls. Bed remains locked and lowered. Personal items and call light w/in reach.  present at bedside. AYAN, VICTOR M.

## 2018-12-02 NOTE — ASSESSMENT & PLAN NOTE
- Valcyte for CMV prophylaxis--> holding (10/31) for leukopenia. Pt will need weekly CMV PCR while discontinued.  - CMV PCR noted to be positive at 129. .  - CMV PCR 11/8: 105  - CMV PCR 11/15 with 89 copies  - CMV PCR 11/23 95 - increased from previous. Continue to hold Valcyte & monitor pt closely.  - CMV PCR 11/29 146  - Bactrim for PCP prophylaxis ([facility]).--> holding (10/31) for leukopenia.   - Start Atovaquone 11/6.  - Fluconazole for fungal prophylaxis (first dose 10/6). D/c Fluc 11/16 bc pt had been on it for > 30 days post take back.

## 2018-12-02 NOTE — SUBJECTIVE & OBJECTIVE
Interval History: ID called back due to positive culture from chyle leak growing GPCs resembling Strep     Review of Systems   Constitutional: Negative for chills, fatigue and fever.   Gastrointestinal: Positive for abdominal distention and abdominal pain. Negative for diarrhea, nausea and vomiting.     Objective:     Vital Signs (Most Recent):  Temp: 99.8 °F (37.7 °C) (12/02/18 1035)  Pulse: 110 (12/02/18 1151)  Resp: (!) 21 (12/02/18 1151)  BP: 125/69 (12/02/18 1151)  SpO2: 96 % (12/02/18 1151) Vital Signs (24h Range):  Temp:  [97.9 °F (36.6 °C)-99.8 °F (37.7 °C)] 99.8 °F (37.7 °C)  Pulse:  [100-112] 110  Resp:  [14-21] 21  SpO2:  [92 %-97 %] 96 %  BP: (106-142)/(65-82) 125/69     Weight: 58 kg (127 lb 13.9 oz)  Body mass index is 22.65 kg/m².    Estimated Creatinine Clearance: 43.9 mL/min (based on SCr of 1 mg/dL).    Physical Exam   Constitutional: She is oriented to person, place, and time. She appears well-developed and well-nourished.   HENT:   Head: Normocephalic and atraumatic.   Eyes: EOM are normal. Pupils are equal, round, and reactive to light.   Neck: Normal range of motion.   Cardiovascular: Normal rate, regular rhythm and normal heart sounds.   Pulmonary/Chest: Effort normal and breath sounds normal.   Abdominal: Soft. Bowel sounds are normal.   Musculoskeletal: Normal range of motion. She exhibits no edema.   Neurological: She is alert and oriented to person, place, and time.   Skin: No rash noted.       Significant Labs:   Blood Culture:   Recent Labs   Lab 09/17/18  1050 09/17/18  1200 10/15/18  1332 10/16/18  1839 10/16/18  1907   LABBLOO No growth after 5 days. No growth after 5 days. No growth after 5 days. No growth after 5 days. No growth after 5 days.     BMP:   Recent Labs   Lab 12/02/18  0644   *      K 3.3*   CL 96   CO2 32*   BUN 63*   CREATININE 1.0   CALCIUM 8.7   MG 1.2*     CBC:   Recent Labs   Lab 12/01/18  0731 12/02/18  0644   WBC 2.94* 2.74*   HGB 8.6* 8.0*   HCT  27.2* 26.1*   * 108*     CMP:   Recent Labs   Lab 12/01/18  0731 12/02/18  0644    139   K 3.9 3.3*   CL 98 96   CO2 30* 32*   * 177*   BUN 61* 63*   CREATININE 1.1 1.0   CALCIUM 8.7 8.7   PROT 4.9* 4.7*   ALBUMIN 2.6* 2.6*   BILITOT 1.2* 1.2*   ALKPHOS 130 122   AST 17 18   ALT 9* 9*   ANIONGAP 11 11   EGFRNONAA 51.3* 57.6*     Microbiology Results (last 7 days)     Procedure Component Value Units Date/Time    Culture, Body Fluid - Bactec [[id number]] Collected:  11/29/18 1229    Order Status:  Completed Specimen:  Body Fluid from Peritoneal Fluid Updated:  12/02/18 0933     Body Fluid Culture, Sterile Gram stain: Gram positive cocci in chains resembling Strep     Body Fluid Culture, Sterile --     ENTEROCOCCUS SPECIES  Identification and susceptibility pending      Gram stain [[id number]] Collected:  11/29/18 1229    Order Status:  Completed Specimen:  Body Fluid from Abdomen Updated:  11/29/18 1758     Gram Stain Result Rare WBC's      No organisms seen    Fungus culture [[id number]]     Order Status:  Completed Specimen:  Abdominal from Abdomen     Fungus culture [[id number]] Collected:  11/29/18 1229    Order Status:  Sent Specimen:  Body Fluid from Abdomen Updated:  11/29/18 1351    Culture, Anaerobic [[id number]] Collected:  11/21/18 1545    Order Status:  Completed Specimen:  Body Fluid from Peritoneal Fluid Updated:  11/28/18 0749     Anaerobic Culture No anaerobes isolated    Fungus culture [[id number]] Collected:  11/21/18 1545    Order Status:  Completed Specimen:  Body Fluid from Peritoneal Fluid Updated:  11/27/18 1122     Fungus (Mycology) Culture Culture in progress    Fungus culture [[id number]] Collected:  11/12/18 1542    Order Status:  Completed Specimen:  Ascites from Abdomen Updated:  11/27/18 1024     Fungus (Mycology) Culture Culture in progress     Fungus (Mycology) Culture No fungus isolated after 2 weeks    Fungus culture [[id number]] Collected:  11/07/18 1435    Order Status:   Completed Specimen:  Abdominal from Abdomen Updated:  11/27/18 1001     Fungus (Mycology) Culture Culture in progress     Fungus (Mycology) Culture No fungus isolated after 2 weeks    Stool culture [[id number]] Collected:  11/22/18 1447    Order Status:  Completed Specimen:  Stool Updated:  11/25/18 1517     Stool Culture No Salmonella,Shigella,Vibrio,Campylobacter,Yersinia isolated.          Significant Imaging: I have reviewed all pertinent imaging results/findings within the past 24 hours.

## 2018-12-02 NOTE — ASSESSMENT & PLAN NOTE
BG goal 140-180    Continue IV insulin infusion protocol  Requires intensive BG monitoring while on protocol; change to q2 hr given finger sticks  Will try to transition in AM.     Discharge planning:  [place]

## 2018-12-02 NOTE — SUBJECTIVE & OBJECTIVE
"Interval HPI:   Overnight events: Remains in TSU. Hypoglycemia yesterday. TPN started overnight. Insulin infusion started and stopped overnight due to hypogylcemia; BG trending up this AM and insulin infusion resumed. BG above goal this on insulin infusion rates 0.4-1.9 u/hr. Solumedrol 2.5 mg daily; steroid taper.   Eating:   NPO  Nausea: No  Hypoglycemia and intervention: No  Fever: No  TPN:  At 45 cc/hr    /64 (Patient Position: Lying)   Pulse 95   Temp 97.6 °F (36.4 °C) (Oral)   Resp 18   Ht 5' 3" (1.6 m)   Wt 58.6 kg (129 lb 3 oz)   LMP  (LMP Unknown)   SpO2 97%   Breastfeeding? No   BMI 22.88 kg/m²     Labs Reviewed and Include    Recent Labs   Lab 12/04/18  0430   *   CALCIUM 8.3*   ALBUMIN 3.0*   PROT 4.8*      K 3.2*   CO2 32*   CL 96   BUN 52*   CREATININE 1.0   ALKPHOS 94   ALT 13   AST 18   BILITOT 1.3*     Lab Results   Component Value Date    WBC 2.10 (L) 12/04/2018    HGB 6.8 (L) 12/04/2018    HCT 22.4 (L) 12/04/2018    MCV 91 12/04/2018     (L) 12/04/2018     No results for input(s): TSH, FREET4 in the last 168 hours.  Lab Results   Component Value Date    HGBA1C 6.0 (H) 10/04/2018       Nutritional status:   Body mass index is 22.88 kg/m².  Lab Results   Component Value Date    ALBUMIN 3.0 (L) 12/04/2018    ALBUMIN 2.7 (L) 12/03/2018    ALBUMIN 2.6 (L) 12/02/2018     Lab Results   Component Value Date    PREALBUMIN 15 (L) 11/03/2018    PREALBUMIN 12 (L) 09/17/2018       Estimated Creatinine Clearance: 43.9 mL/min (based on SCr of 1 mg/dL).    Accu-Checks  Recent Labs     12/03/18  2316 12/04/18  0015 12/04/18  0114 12/04/18  0216 12/04/18  0357 12/04/18  0457 12/04/18  0559 12/04/18  0718 12/04/18  0817 12/04/18  0925   POCTGLUCOSE 138* 120* 145* 155* 218* 240* 258* 307* 285* 253*       Current Medications and/or Treatments Impacting Glycemic Control  Immunotherapy:    Immunosuppressants         Stop Route Frequency     tacrolimus capsule 2 mg      -- Oral 2 times " daily        Steroids:   Hormones (From admission, onward)    Start     Stop Route Frequency Ordered    12/03/18 1400  octreotide injection 50 mcg      -- SubQ Every 8 hours 12/03/18 1116    12/01/18 0900  predniSONE tablet 2.5 mg      12/08 0859 Oral Daily 11/05/18 1004    10/17/18 0945  predniSONE tablet 15 mg      10/24 0859 Oral Daily 10/17/18 0943        Pressors:    Autonomic Drugs (From admission, onward)    Start     Stop Route Frequency Ordered    10/06/18 1617  rocuronium (ZEMURON) 10 mg/mL injection     Comments:  Created by cabinet override    10/07 0429   10/06/18 1617        Hyperglycemia/Diabetes Medications:   Antihyperglycemics (From admission, onward)    Start     Stop Route Frequency Ordered    12/03/18 2200  insulin regular (Humulin R) 100 Units in sodium chloride 0.9% 100 mL infusion     Question:  Insulin Rate Adjustment (DO NOT MODIFY ANSWER)  Answer:  [email or web address]    -- IV Continuous 12/03/18 1640    10/07/18 1200  insulin regular (Humulin R) 100 Units in sodium chloride 0.9% 100 mL infusion      10/13 2100 IV Continuous 10/07/18 1055

## 2018-12-03 LAB
ALBUMIN SERPL BCP-MCNC: 2.7 G/DL
ALP SERPL-CCNC: 127 U/L
ALT SERPL W/O P-5'-P-CCNC: 15 U/L
ANION GAP SERPL CALC-SCNC: 12 MMOL/L
AST SERPL-CCNC: 21 U/L
BASOPHILS # BLD AUTO: 0.02 K/UL
BASOPHILS NFR BLD: 0.7 %
BILIRUB SERPL-MCNC: 1.4 MG/DL
BUN SERPL-MCNC: 57 MG/DL
CALCIUM SERPL-MCNC: 8.6 MG/DL
CHLORIDE SERPL-SCNC: 95 MMOL/L
CO2 SERPL-SCNC: 31 MMOL/L
CREAT SERPL-MCNC: 1.1 MG/DL
DIFFERENTIAL METHOD: ABNORMAL
EOSINOPHIL # BLD AUTO: 0 K/UL
EOSINOPHIL NFR BLD: 1.4 %
ERYTHROCYTE [DISTWIDTH] IN BLOOD BY AUTOMATED COUNT: 16.4 %
EST. GFR  (AFRICAN AMERICAN): 59.2 ML/MIN/1.73 M^2
EST. GFR  (NON AFRICAN AMERICAN): 51.3 ML/MIN/1.73 M^2
GLUCOSE SERPL-MCNC: 191 MG/DL
HCT VFR BLD AUTO: 26.3 %
HGB BLD-MCNC: 8.5 G/DL
IMM GRANULOCYTES # BLD AUTO: 0.1 K/UL
IMM GRANULOCYTES NFR BLD AUTO: 3.5 %
LYMPHOCYTES # BLD AUTO: 0.4 K/UL
LYMPHOCYTES NFR BLD: 12.2 %
MAGNESIUM SERPL-MCNC: 1.4 MG/DL
MCH RBC QN AUTO: 28.6 PG
MCHC RBC AUTO-ENTMCNC: 32.3 G/DL
MCV RBC AUTO: 89 FL
MONOCYTES # BLD AUTO: 0.4 K/UL
MONOCYTES NFR BLD: 12.2 %
NEUTROPHILS # BLD AUTO: 2 K/UL
NEUTROPHILS NFR BLD: 70 %
NRBC BLD-RTO: 0 /100 WBC
PHOSPHATE SERPL-MCNC: 4 MG/DL
PLATELET # BLD AUTO: 121 K/UL
PMV BLD AUTO: 10.3 FL
POCT GLUCOSE: 115 MG/DL (ref 70–110)
POCT GLUCOSE: 155 MG/DL (ref 70–110)
POCT GLUCOSE: 156 MG/DL (ref 70–110)
POCT GLUCOSE: 222 MG/DL (ref 70–110)
POCT GLUCOSE: 224 MG/DL (ref 70–110)
POCT GLUCOSE: 33 MG/DL (ref 70–110)
POCT GLUCOSE: 40 MG/DL (ref 70–110)
POCT GLUCOSE: 57 MG/DL (ref 70–110)
POCT GLUCOSE: 68 MG/DL (ref 70–110)
POCT GLUCOSE: 80 MG/DL (ref 70–110)
POCT GLUCOSE: 83 MG/DL (ref 70–110)
POCT GLUCOSE: 83 MG/DL (ref 70–110)
POCT GLUCOSE: 88 MG/DL (ref 70–110)
POTASSIUM SERPL-SCNC: 3.4 MMOL/L
PROT SERPL-MCNC: 5.1 G/DL
RBC # BLD AUTO: 2.97 M/UL
SODIUM SERPL-SCNC: 138 MMOL/L
TACROLIMUS BLD-MCNC: 2.8 NG/ML
WBC # BLD AUTO: 2.86 K/UL

## 2018-12-03 PROCEDURE — 85025 COMPLETE CBC W/AUTO DIFF WBC: CPT

## 2018-12-03 PROCEDURE — 36569 INSJ PICC 5 YR+ W/O IMAGING: CPT

## 2018-12-03 PROCEDURE — A4217 STERILE WATER/SALINE, 500 ML: HCPCS | Performed by: PHYSICIAN ASSISTANT

## 2018-12-03 PROCEDURE — 25000003 PHARM REV CODE 250: Performed by: NURSE PRACTITIONER

## 2018-12-03 PROCEDURE — 97535 SELF CARE MNGMENT TRAINING: CPT

## 2018-12-03 PROCEDURE — 63600175 PHARM REV CODE 636 W HCPCS: Performed by: NURSE PRACTITIONER

## 2018-12-03 PROCEDURE — 63600175 PHARM REV CODE 636 W HCPCS: Performed by: INTERNAL MEDICINE

## 2018-12-03 PROCEDURE — 25000003 PHARM REV CODE 250: Performed by: PHYSICIAN ASSISTANT

## 2018-12-03 PROCEDURE — A4216 STERILE WATER/SALINE, 10 ML: HCPCS | Performed by: NURSE PRACTITIONER

## 2018-12-03 PROCEDURE — 97110 THERAPEUTIC EXERCISES: CPT

## 2018-12-03 PROCEDURE — 97530 THERAPEUTIC ACTIVITIES: CPT

## 2018-12-03 PROCEDURE — P9047 ALBUMIN (HUMAN), 25%, 50ML: HCPCS | Mod: JG | Performed by: PHYSICIAN ASSISTANT

## 2018-12-03 PROCEDURE — 63600175 PHARM REV CODE 636 W HCPCS: Performed by: PHYSICIAN ASSISTANT

## 2018-12-03 PROCEDURE — 25000003 PHARM REV CODE 250: Performed by: TRANSPLANT SURGERY

## 2018-12-03 PROCEDURE — 97116 GAIT TRAINING THERAPY: CPT

## 2018-12-03 PROCEDURE — 80053 COMPREHEN METABOLIC PANEL: CPT

## 2018-12-03 PROCEDURE — 84100 ASSAY OF PHOSPHORUS: CPT

## 2018-12-03 PROCEDURE — 99233 SBSQ HOSP IP/OBS HIGH 50: CPT | Mod: ,,, | Performed by: INTERNAL MEDICINE

## 2018-12-03 PROCEDURE — C1751 CATH, INF, PER/CENT/MIDLINE: HCPCS

## 2018-12-03 PROCEDURE — 02HV33Z INSERTION OF INFUSION DEVICE INTO SUPERIOR VENA CAVA, PERCUTANEOUS APPROACH: ICD-10-PCS | Performed by: TRANSPLANT SURGERY

## 2018-12-03 PROCEDURE — 76937 US GUIDE VASCULAR ACCESS: CPT

## 2018-12-03 PROCEDURE — 36415 COLL VENOUS BLD VENIPUNCTURE: CPT

## 2018-12-03 PROCEDURE — A4216 STERILE WATER/SALINE, 10 ML: HCPCS | Performed by: TRANSPLANT SURGERY

## 2018-12-03 PROCEDURE — 99233 SBSQ HOSP IP/OBS HIGH 50: CPT | Mod: 24,,, | Performed by: PHYSICIAN ASSISTANT

## 2018-12-03 PROCEDURE — 20600001 HC STEP DOWN PRIVATE ROOM

## 2018-12-03 PROCEDURE — 83735 ASSAY OF MAGNESIUM: CPT

## 2018-12-03 PROCEDURE — 80197 ASSAY OF TACROLIMUS: CPT

## 2018-12-03 RX ORDER — ALBUMIN HUMAN 250 G/1000ML
25 SOLUTION INTRAVENOUS EVERY 8 HOURS
Status: COMPLETED | OUTPATIENT
Start: 2018-12-03 | End: 2018-12-03

## 2018-12-03 RX ORDER — SODIUM CHLORIDE 0.9 % (FLUSH) 0.9 %
10 SYRINGE (ML) INJECTION
Status: DISCONTINUED | OUTPATIENT
Start: 2018-12-03 | End: 2018-12-21 | Stop reason: HOSPADM

## 2018-12-03 RX ORDER — TACROLIMUS 1 MG/1
2 CAPSULE ORAL 2 TIMES DAILY
Status: DISCONTINUED | OUTPATIENT
Start: 2018-12-03 | End: 2018-12-08

## 2018-12-03 RX ORDER — SODIUM CHLORIDE 0.9 % (FLUSH) 0.9 %
10 SYRINGE (ML) INJECTION EVERY 6 HOURS
Status: DISCONTINUED | OUTPATIENT
Start: 2018-12-03 | End: 2018-12-21 | Stop reason: HOSPADM

## 2018-12-03 RX ORDER — VANCOMYCIN HCL IN 5 % DEXTROSE 1G/250ML
1000 PLASTIC BAG, INJECTION (ML) INTRAVENOUS
Status: DISCONTINUED | OUTPATIENT
Start: 2018-12-03 | End: 2018-12-06

## 2018-12-03 RX ORDER — OCTREOTIDE ACETATE 100 UG/ML
50 INJECTION, SOLUTION INTRAVENOUS; SUBCUTANEOUS EVERY 8 HOURS
Status: DISCONTINUED | OUTPATIENT
Start: 2018-12-03 | End: 2018-12-11

## 2018-12-03 RX ADMIN — SODIUM CHLORIDE 0.5 UNITS/HR: 9 INJECTION, SOLUTION INTRAVENOUS at 09:12

## 2018-12-03 RX ADMIN — OCTREOTIDE ACETATE 50 MCG: 100 INJECTION, SOLUTION INTRAVENOUS; SUBCUTANEOUS at 10:12

## 2018-12-03 RX ADMIN — DIBASIC SODIUM PHOSPHATE, MONOBASIC POTASSIUM PHOSPHATE AND MONOBASIC SODIUM PHOSPHATE 2 TABLET: 852; 155; 130 TABLET ORAL at 10:12

## 2018-12-03 RX ADMIN — HEPARIN SODIUM 5000 UNITS: 5000 INJECTION, SOLUTION INTRAVENOUS; SUBCUTANEOUS at 03:12

## 2018-12-03 RX ADMIN — FUROSEMIDE 40 MG: 40 TABLET ORAL at 06:12

## 2018-12-03 RX ADMIN — DIBASIC SODIUM PHOSPHATE, MONOBASIC POTASSIUM PHOSPHATE AND MONOBASIC SODIUM PHOSPHATE 2 TABLET: 852; 155; 130 TABLET ORAL at 08:12

## 2018-12-03 RX ADMIN — FUROSEMIDE 40 MG: 40 TABLET ORAL at 08:12

## 2018-12-03 RX ADMIN — INSULIN ASPART 4 UNITS: 100 INJECTION, SOLUTION INTRAVENOUS; SUBCUTANEOUS at 01:12

## 2018-12-03 RX ADMIN — Medication 3 ML: at 10:12

## 2018-12-03 RX ADMIN — INSULIN ASPART 14 UNITS: 100 INJECTION, SOLUTION INTRAVENOUS; SUBCUTANEOUS at 01:12

## 2018-12-03 RX ADMIN — PREDNISONE 2.5 MG: 2.5 TABLET ORAL at 08:12

## 2018-12-03 RX ADMIN — ATOVAQUONE 1500 MG: 750 SUSPENSION ORAL at 08:12

## 2018-12-03 RX ADMIN — DEXTROSE MONOHYDRATE 25 G: 25 INJECTION, SOLUTION INTRAVENOUS at 05:12

## 2018-12-03 RX ADMIN — VANCOMYCIN HYDROCHLORIDE 1000 MG: 1 INJECTION, POWDER, LYOPHILIZED, FOR SOLUTION INTRAVENOUS at 06:12

## 2018-12-03 RX ADMIN — ALBUMIN HUMAN 25 G: 0.25 SOLUTION INTRAVENOUS at 03:12

## 2018-12-03 RX ADMIN — MAGNESIUM OXIDE TAB 400 MG (241.3 MG ELEMENTAL MG) 800 MG: 400 (241.3 MG) TAB at 08:12

## 2018-12-03 RX ADMIN — CALCIUM GLUCONATE: 94 INJECTION, SOLUTION INTRAVENOUS at 09:12

## 2018-12-03 RX ADMIN — TACROLIMUS 2 MG: 1 CAPSULE ORAL at 06:12

## 2018-12-03 RX ADMIN — TACROLIMUS 1.5 MG: 1 CAPSULE ORAL at 08:12

## 2018-12-03 RX ADMIN — ALBUMIN HUMAN 25 G: 0.25 SOLUTION INTRAVENOUS at 10:12

## 2018-12-03 RX ADMIN — HEPARIN SODIUM 5000 UNITS: 5000 INJECTION, SOLUTION INTRAVENOUS; SUBCUTANEOUS at 10:12

## 2018-12-03 RX ADMIN — DEXTROSE MONOHYDRATE 25 G: 25 INJECTION, SOLUTION INTRAVENOUS at 06:12

## 2018-12-03 RX ADMIN — Medication 10 ML: at 06:12

## 2018-12-03 RX ADMIN — ENTECAVIR 0.5 MG: 0.5 TABLET ORAL at 08:12

## 2018-12-03 RX ADMIN — MAGNESIUM OXIDE TAB 400 MG (241.3 MG ELEMENTAL MG) 800 MG: 400 (241.3 MG) TAB at 10:12

## 2018-12-03 RX ADMIN — PANTOPRAZOLE SODIUM 40 MG: 40 TABLET, DELAYED RELEASE ORAL at 10:12

## 2018-12-03 RX ADMIN — ASPIRIN 81 MG CHEWABLE TABLET 81 MG: 81 TABLET CHEWABLE at 08:12

## 2018-12-03 RX ADMIN — INSULIN ASPART 14 UNITS: 100 INJECTION, SOLUTION INTRAVENOUS; SUBCUTANEOUS at 08:12

## 2018-12-03 RX ADMIN — DEXTROSE MONOHYDRATE 12.5 G: 500 INJECTION PARENTERAL at 10:12

## 2018-12-03 RX ADMIN — METOPROLOL TARTRATE 25 MG: 25 TABLET ORAL at 10:12

## 2018-12-03 RX ADMIN — OCTREOTIDE ACETATE 50 MCG: 100 INJECTION, SOLUTION INTRAVENOUS; SUBCUTANEOUS at 03:12

## 2018-12-03 RX ADMIN — PANTOPRAZOLE SODIUM 40 MG: 40 TABLET, DELAYED RELEASE ORAL at 08:12

## 2018-12-03 NOTE — PLAN OF CARE
Problem: Patient Care Overview  Goal: Plan of Care Review  Outcome: Ongoing (interventions implemented as appropriate)  Recommendation/Intervention:   1. If PN desired, Custom 75gm AA, 200gm Dextrose.    - to provide 75gm protein, 980 calories + IV Lipids.   - Meets 82% EEN, 100% EPN.   2 Dietitian Following    Goals:   1. Patient to meet >85% EEN/EPN.   2. Promote nutrition related labs WNL  Nutrition Goal Status: progressing towards goal    Severe malnutrition   Nutrition Problem  Severe Malnutrition in the context of Chronic Illness/Injury     Related to (etiology):  ESLD     Signs and Symptoms (as evidenced by):  Energy Intake: <75% of estimated energy requirement for 2 months  Body Fat Depletion: Severe depletion of orbitals and triceps   Muscle Mass Depletion: Severe depletion of temples, clavicle region and lower extremities   Weight Loss: 12.31% x 1 month     Nutrition Diagnosis Status:  Improving     Full assessment completed, see Dietitian Note 12/3/2018.

## 2018-12-03 NOTE — PT/OT/SLP PROGRESS
Occupational Therapy   Treatment    Name: Scarlett Reddy  MRN: [id number]  Admitting Diagnosis:  Liver transplanted  14 Days Post-Op    Recommendations:     Discharge Recommendations: home health OT, home health PT  Discharge Equipment Recommendations:  walker, rolling  Barriers to discharge:  None    Subjective     Pain/Comfort:  · Pain Rating 1: 0/10  · Pain Rating Post-Intervention 1: 0/10    Objective:     Communicated with: RN prior to session.  Patient found with all lines intact, call button in reach and Rn notified and VALORIE drain, peripheral IV, pulse ox (continuous), telemetry, wound vac upon OT entry to room.    General Precautions: Standard, fall, contact   Orthopedic Precautions:N/A   Braces: N/A     Occupational Performance:    Bed Mobility:    · Nt, pt found seated UIC upon arrival.     Functional Mobility/Transfers:  · Patient completed Sit <> Stand Transfer with minimal assistance  with  no assistive device from bedside chair  · Patient completed Toilet Transfer Stand Pivot technique with minimum assistance with  rolling walker  · Functional Mobility: pt completed    Activities of Daily Living:  · Grooming: stand by assistance to wash hands while standing at sink   · Toileting: minimum assistance to complete toilet hygiene while standing    AMPAC 6 Click ADL: 19    Treatment & Education:  -Pt edu on OT role/POC  -Importance of OOB activity with staff assistance  -Safety during functional t/f and mobility ( RW management)   - White board updated  - Multiple self care tasks completed-- assistance level noted above  - All questions/concerns answered within OT scope of practice  - Pt completed BUE/BLEROM exercises while seated UIC including: 1x15 reps in shoulder flexion, elbow flexion/extension, chest press, and scapular squeezes; hip flexion, knee flexion/extension, and ankle pumps. Pt tolerated well with min rest breaks between each exercise.     Patient left up in chair with all lines intact, call button  in reach and RN notified  Education:    Assessment:     Scarlett Reddy is a 68 y.o. female with a medical diagnosis of Liver transplanted.  She presents with the following performance deficits affecting function are weakness, impaired endurance, gait instability, impaired balance, impaired self care skills, impaired functional mobilty, impaired cardiopulmonary response to activity.     Rehab Prognosis:  Good; patient would benefit from acute skilled OT services to address these deficits and reach maximum level of function.       Plan:     Patient to be seen 3 x/week to address the above listed problems via self-care/home management, therapeutic activities, therapeutic exercises, neuromuscular re-education  · Plan of Care Expires: 12/07/18  · Plan of Care Reviewed with: patient, spouse    This Plan of care has been discussed with the patient who was involved in its development and understands and is in agreement with the identified goals and treatment plan    GOALS:   Multidisciplinary Problems     Occupational Therapy Goals        Problem: Occupational Therapy Goal    Goal Priority Disciplines Outcome Interventions   Occupational Therapy Goal     OT, PT/OT Ongoing (interventions implemented as appropriate)    Description:  Goals to be met by: 12/8/18 ( Revised: 11/26)    Patient will increase functional independence with ADLs by performing:    UE Dressing with Supervision.  LE Dressing with Supervision.  Grooming while standing at sink with Supervision.   Toileting from toilet with Supervision for hygiene and clothing management.   Toilet transfer to toilet with Supervision.                          Time Tracking:     OT Date of Treatment: 12/03/18  OT Start Time: 1500  OT Stop Time: 1519  OT Total Time (min): 19 min    Billable Minutes:Therapeutic Activity 19    Laura corado OT  12/3/2018

## 2018-12-03 NOTE — PLAN OF CARE
Problem: Patient Care Overview  Goal: Plan of Care Review  Outcome: Ongoing (interventions implemented as appropriate)  AOx4, VSS, denies pain. Accuchecks ACHS - coverage needed for . Emptied VALORIE Q4 - drainage continues to be milky/pink tinged. Output ranged from . Wound vac w/ no output this shift. Wound dressing remains CDI. Remains free from falls. Bed remains locked and lowered. Personal items and call light w/in reach.  present at bedside. VICTOR M BOTELLO.

## 2018-12-03 NOTE — ASSESSMENT & PLAN NOTE
- Common post liver transplant  - CT 11/26 showed: Large right and moderate left pleural effusions with adjacent basilar atelectasis.  - Pt sp02 stable on RA, but c/o some orthopnea.  - CT abd/pelvis 12/3 showed unchanged b/l pleural effusions (large on R, moderate on L) with bibasilar compressive atelectasis and consolidative changes.  - Continue w/ diuresis.

## 2018-12-03 NOTE — ASSESSMENT & PLAN NOTE
- Dietary consulted.   - Temporal and distal extremity muscle wasting and edema.  - Encourage supplements and to increase nutritional intake.  - Per nephrology, pt to be on low protein diet.  - Prealbumin reviewed.   - Tolerating diet.  Denies n/v.    -Told pt it's ok to resume outside protein shakes as renal fx stable, but will monitor closely.  - Attempted low fat, high protein diet due to suspicion for chyle leak with no improvement seen in fluid character.  - Will place PICC line and start TPN tonight 12/3.

## 2018-12-03 NOTE — PROGRESS NOTES
PICC team at bedside for line placement. Called into patient's room as patient not feeling well - patient's cheeks appear flushed. Checked BG - 33. RN left room to obtain D50W and upon return, patient's  states he gave her some honey. Only able to give 1/2 amp D50W as PIV infiltrated.     1712 BG rechecked 15 minutes later - improved to 115. Endocrine paged. PICC team now to place PICC.  at bedside.     1720 SONG Willams NP notified of above. Instructed to recheck BG at 1800. Patient reports feeling better at this time.

## 2018-12-03 NOTE — ASSESSMENT & PLAN NOTE
- HBsAg+, Received HBIG (last weekly dose 11/2).  - Tx w/ Entecavir- dose changed to daily 11/26 due to improved renal fx  - Hep B surface antigen, surface AB quant, and Hep B DNA 12/3 pending

## 2018-12-03 NOTE — ASSESSMENT & PLAN NOTE
- Continue home regimen.  - Endocrine consulted.  - Endocrine plans to start insulin drip tonight w/ TPN and monitor closely. Appreciate recs.

## 2018-12-03 NOTE — ASSESSMENT & PLAN NOTE
- Valcyte for CMV prophylaxis--> holding (10/31) for leukopenia. Pt will need weekly CMV PCR while discontinued.  - CMV PCR noted to be positive at 129. .  - CMV PCR 11/8: 105  - CMV PCR 11/15 with 89 copies  - CMV PCR 11/23 95 - increased from previous. Continue to hold Valcyte & monitor pt closely.  - CMV PCR 11/29 146 - per ID, will continue to monitor w/ weekly PCRs.  - Bactrim for PCP prophylaxis ([facility]).--> holding (10/31) for leukopenia.   - Start Atovaquone 11/6.  - Fluconazole for fungal prophylaxis (first dose 10/6). D/c Fluc 11/16 bc pt had been on it for > 30 days post take back.

## 2018-12-03 NOTE — CONSULTS
Double lumen PICC placed in right brachial vein of HUGO, 34cm in length with 0cm exposed and 21cm arm circumference. Lot#[id number].

## 2018-12-03 NOTE — PLAN OF CARE
Problem: Occupational Therapy Goal  Goal: Occupational Therapy Goal  Goals to be met by: 12/8/18 ( Revised: 11/26)    Patient will increase functional independence with ADLs by performing:    UE Dressing with Supervision.  LE Dressing with Supervision.  Grooming while standing at sink with Supervision.   Toileting from toilet with Supervision for hygiene and clothing management.   Toilet transfer to toilet with Supervision.         Outcome: Ongoing (interventions implemented as appropriate)  Continue with POC. Pt progressing well with goals.   Laura corado OT  12/3/2018

## 2018-12-03 NOTE — PROCEDURES
"Scarlett Reddy is a 68 y.o. female patient.    Temp: 98.6 °F (37 °C) (12/03/18 1551)  Pulse: (!) 111 (12/03/18 0830)  Resp: 17 (12/03/18 0830)  BP: 114/83 (12/03/18 0830)  SpO2: 96 % (12/03/18 0830)  Weight: 58.6 kg (129 lb 3 oz) (12/03/18 1600)  Height: 5' 3" (160 cm) (10/04/18 2130)    PICC  Date/Time: 12/3/2018 5:21 PM  Performed by: Tank Stoner RN  Assisting provider: Heather Corea LPN  Consent Done: Yes  Time out: Immediately prior to procedure a time out was called to verify the correct patient, procedure, equipment, support staff and site/side marked as required  Indications: med administration and vascular access  Anesthesia: local infiltration  Local anesthetic: lidocaine 1% without epinephrine  Anesthetic Total (mL): 3  Preparation: skin prepped with ChloraPrep  Skin prep agent dried: skin prep agent completely dried prior to procedure  Sterile barriers: all five maximum sterile barriers used - cap, mask, sterile gown, sterile gloves, and large sterile sheet  Hand hygiene: hand hygiene performed prior to central venous catheter insertion  Location details: right brachial  Catheter type: double lumen  Catheter size: 5 Fr  Catheter Length: 34cm    Ultrasound guidance: yes  Vessel Caliber: medium and patent, compressibility normal  Vascular Doppler: not done  Needle advanced into vessel with real time Ultrasound guidance.  Guidewire confirmed in vessel.  Image recorded and saved.  Sterile sheath used.  Number of attempts: 1  Post-procedure: blood return through all ports, chlorhexidine patch and sterile dressing applied  Technical procedures used: 3cg  Specimens: No  Implants: No  Assessment: placement verified by x-ray  Complications: none          Heather Corea  12/3/2018  "

## 2018-12-03 NOTE — ASSESSMENT & PLAN NOTE
- Fluctuating H&H.    - Transfusions: 10/14; 10/16; 10/18; 10/19; 10/22; 10/27; 10/29, 10/31, 11/3, 11/21, 11/29  - FOBT +  - LDH elevated, Hapto < 10.  - EGD 10/18: ulcerated duodenal mucosa.   - Consulted GI about this ongoing issue and further need of another EGD.  - EGD 11/5 unremarkable.   - Iron studies 11/13:  95, TIBC 107, sat iron 89, transferrin 72, ferritin pending  - Hemolytic anemia workup 11/13: Haptoglobin 132, retic 3.9   - Continue to monitor.

## 2018-12-03 NOTE — SUBJECTIVE & OBJECTIVE
Interval History: ID called back due to positive culture from chyle leak growing GPCs resembling Strep     Review of Systems   Constitutional: Negative for chills, fatigue and fever.   Gastrointestinal: Positive for abdominal distention and abdominal pain. Negative for diarrhea, nausea and vomiting.     Objective:     Vital Signs (Most Recent):  Temp: 98.6 °F (37 °C) (12/03/18 1551)  Pulse: (!) 111 (12/03/18 0830)  Resp: 17 (12/03/18 0830)  BP: 114/83 (12/03/18 0830)  SpO2: 96 % (12/03/18 0830) Vital Signs (24h Range):  Temp:  [98.3 °F (36.8 °C)-98.6 °F (37 °C)] 98.6 °F (37 °C)  Pulse:  [103-112] 111  Resp:  [16-21] 17  SpO2:  [92 %-97 %] 96 %  BP: (114-136)/(74-86) 114/83     Weight: 58.6 kg (129 lb 3 oz)  Body mass index is 22.88 kg/m².    Estimated Creatinine Clearance: 39.9 mL/min (based on SCr of 1.1 mg/dL).    Physical Exam   Constitutional: She is oriented to person, place, and time. She appears well-developed and well-nourished.   HENT:   Head: Normocephalic and atraumatic.   Eyes: EOM are normal. Pupils are equal, round, and reactive to light.   Neck: Normal range of motion.   Cardiovascular: Normal rate, regular rhythm and normal heart sounds.   Pulmonary/Chest: Effort normal and breath sounds normal.   Abdominal: Soft. Bowel sounds are normal.   Musculoskeletal: Normal range of motion. She exhibits no edema.   Neurological: She is alert and oriented to person, place, and time.   Skin: No rash noted.       Significant Labs:   Blood Culture:   Recent Labs   Lab 09/17/18  1050 09/17/18  1200 10/15/18  1332 10/16/18  1839 10/16/18  1907   LABBLOO No growth after 5 days. No growth after 5 days. No growth after 5 days. No growth after 5 days. No growth after 5 days.     BMP:   Recent Labs   Lab 12/03/18  0821   *      K 3.4*   CL 95   CO2 31*   BUN 57*   CREATININE 1.1   CALCIUM 8.6*   MG 1.4*     CBC:   Recent Labs   Lab 12/02/18  0644 12/03/18  0821   WBC 2.74* 2.86*   HGB 8.0* 8.5*   HCT 26.1*  26.3*   * 121*     CMP:   Recent Labs   Lab 12/02/18  0644 12/03/18  0821    138   K 3.3* 3.4*   CL 96 95   CO2 32* 31*   * 191*   BUN 63* 57*   CREATININE 1.0 1.1   CALCIUM 8.7 8.6*   PROT 4.7* 5.1*   ALBUMIN 2.6* 2.7*   BILITOT 1.2* 1.4*   ALKPHOS 122 127   AST 18 21   ALT 9* 15   ANIONGAP 11 12   EGFRNONAA 57.6* 51.3*     Microbiology Results (last 7 days)     Procedure Component Value Units Date/Time    Fungus culture [[id number]] Collected:  11/29/18 1229    Order Status:  Completed Specimen:  Body Fluid from Abdomen Updated:  12/03/18 0949     Fungus (Mycology) Culture Culture in progress    Culture, Body Fluid - Bactec [[id number]]  (Susceptibility) Collected:  11/29/18 1229    Order Status:  Completed Specimen:  Body Fluid from Peritoneal Fluid Updated:  12/02/18 1344     Body Fluid Culture, Sterile Gram stain: Gram positive cocci in chains resembling Strep     Body Fluid Culture, Sterile ENTEROCOCCUS FAECIUM    Gram stain [[id number]] Collected:  11/29/18 1229    Order Status:  Completed Specimen:  Body Fluid from Abdomen Updated:  11/29/18 1758     Gram Stain Result Rare WBC's      No organisms seen    Fungus culture [[id number]]     Order Status:  Completed Specimen:  Abdominal from Abdomen     Culture, Anaerobic [[id number]] Collected:  11/21/18 1545    Order Status:  Completed Specimen:  Body Fluid from Peritoneal Fluid Updated:  11/28/18 0749     Anaerobic Culture No anaerobes isolated    Fungus culture [[id number]] Collected:  11/21/18 1545    Order Status:  Completed Specimen:  Body Fluid from Peritoneal Fluid Updated:  11/27/18 1122     Fungus (Mycology) Culture Culture in progress    Fungus culture [[id number]] Collected:  11/12/18 1542    Order Status:  Completed Specimen:  Ascites from Abdomen Updated:  11/27/18 1024     Fungus (Mycology) Culture Culture in progress     Fungus (Mycology) Culture No fungus isolated after 2 weeks    Fungus culture [[id number]] Collected:  11/07/18 1433     Order Status:  Completed Specimen:  Abdominal from Abdomen Updated:  11/27/18 1001     Fungus (Mycology) Culture Culture in progress     Fungus (Mycology) Culture No fungus isolated after 2 weeks          Significant Imaging: I have reviewed all pertinent imaging results/findings within the past 24 hours.

## 2018-12-03 NOTE — PROGRESS NOTES
" [facility]  Adult Nutrition  Progress Note     SUMMARY       Recommendations  Recommendation/Intervention:   1. If PN desired, Custom 75gm AA, 200gm Dextrose.    - to provide 75gm protein, 980 calories + IV Lipids.   - Meets 82% EEN, 100% EPN.   2 Dietitian Following    Goals:   1. Patient to meet >85% EEN/EPN.   2. Promote nutrition related labs WNL  Nutrition Goal Status: progressing towards goal  Communication of RD Recs: (POC)    General Information Comments: Patient with chyle leak. Discussed in rounds possible NPO followed by TPN vs PPN. Previously attempted low fat diet but appears insignificant. Noted Paracentesis 11/21/18 4L removed. NFPE 10/3/18. Patient with Severe Malnutrition.     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, DM  Interdisciplinary Rounds: attended    Nutrition Risk Screen  Nutrition Risk Screen: cultural or Christian food preferences    Nutrition/Diet History  Patient Reported Diet/Restrictions/Preferences: diabetic diet, low salt(Cultural preferences)  Food Preferences: Cultural preferences Noted  Do you have any cultural, spiritual, Christian conflicts, given your current situation?: none  Supplemental Drinks or Food Habits: Boost Plus, Ensure Plus(Strawberry)  Food Allergies: NKFA  Factors Affecting Nutritional Intake: decreased appetite    Anthropometrics  Temp: 98.4 °F (36.9 °C)  Height Method: Stated  Height: 5' 3" (160 cm)  Height (inches): 63 in  Weight Method: Standard Scale  Weight: 58 kg (127 lb 13.9 oz)  Weight (lb): 127.87 lb  Ideal Body Weight (IBW), Female: 115 lb  % Ideal Body Weight, Female (lb): 101.8 lb  BMI (Calculated): 20.8  BMI Grade: 25 - 29.9 - overweight  Weight Loss: unintentional  Weight Loss Since Admission: 16 lb 0.4 oz   "   Lab/Procedures/Meds  Labs: Reviewed    12/03/2018    K 3.4 (L) 12/03/2018    BUN 57 (H) 12/03/2018    CREATININE 1.1 12/03/2018    CALCIUM 8.6 (L) 12/03/2018    PHOS 4.0 12/03/2018    MG 1.4 (L) 12/03/2018    EGFRNONAA 51.3 (A) 12/03/2018    ALBUMIN 2.7 (L) 12/03/2018      ALT 15 12/03/2018    AST 21 12/03/2018    ALKPHOS 127 12/03/2018     Meds: Reviewed  Scheduled Meds:   aspirin  81 mg Oral Daily    atovaquone  1,500 mg Oral Daily    entecavir  0.5 mg Oral Daily    furosemide  40 mg Oral BID    heparin (porcine)  5,000 Units Subcutaneous Q8H    insulin aspart U-100  7-14 Units Subcutaneous TIDWM    insulin detemir U-100  10 Units Subcutaneous Daily    k phos di & mono-sod phos mono  2 tablet Oral BID    magnesium oxide  800 mg Oral BID    metoprolol tartrate  25 mg Oral BID    octreotide  50 mcg Subcutaneous Q8H    pantoprazole  40 mg Oral BID    predniSONE  2.5 mg Oral Daily    tacrolimus  1.5 mg Oral BID    vancomycin (VANCOCIN) IVPB  750 mg Intravenous Q24H     Physical Findings/Assessment  Overall Physical Appearance: loss of subcutaneous fat, loss of muscle mass  Oral/Mouth Cavity: tooth/teeth missing  Skin: edema, intact, skin tear(Skin Tear Lower Abdomen)    Estimated/Assessed Needs  Weight Used For Calorie Calculations: 63.2 kg (139 lb 5.3 oz)  Energy Calorie Requirements (kcal): 1896(30 kcal/kg)  Energy Need Method: Kcal/kg  Protein Requirements: 76-88(1.2-1.4 gm/kg)  Weight Used For Protein Calculations: 63.2 kg (139 lb 5.3 oz)  Fluid Requirements (mL): 1 mL/kcal or per MD  Fluid Need Method: RDA Method  RDA Method (mL): 1896     Nutrition Prescription Ordered  Current Diet Order: Low Chol/Sat Fat  Nutrition Order Comments: Fluid - 1500mL  Oral Nutrition Supplement: -    Evaluation of Received Nutrient/Fluid Intake in last 24h  I/O: -2.0L since 11/19/18  Comments: LBM 12/2/18  % Intake of Estimated Energy Needs: 0 - 25 %  % Meal Intake: 0 - 25 %    Nutrition Risk  Level of  Risk/Frequency of Follow-up: high(2 x week)     Assessment and Plan  NFPE completed 10/3/18  Severe malnutrition   Nutrition Problem  Severe Malnutrition in the context of Chronic Illness/Injury     Related to (etiology):  ESLD     Signs and Symptoms (as evidenced by):  Energy Intake: <75% of estimated energy requirement for 2 months  Body Fat Depletion: Severe depletion of orbitals and triceps   Muscle Mass Depletion: Severe depletion of temples, clavicle region and lower extremities   Weight Loss: 12.31% x 1 month     Nutrition Diagnosis Status:  Improving      Monitor and Evaluation  Food and Nutrient Intake: energy intake, food and beverage intake  Food and Nutrient Adminstration: diet order  Knowledge/Beliefs/Attitudes: food and nutrition knowledge/skill  Physical Activity and Function: nutrition-related ADLs and IADLs  Anthropometric Measurements: weight change, weight  Biochemical Data, Medical Tests and Procedures: electrolyte and renal panel, gastrointestinal profile, glucose/endocrine profile, inflammatory profile, lipid profile  Nutrition-Focused Physical Findings: overall appearance     Nutrition Follow-Up  RD Follow-up?: Yes

## 2018-12-03 NOTE — CARE UPDATE
Pt NPO, starting TPN tonight  BG monitoring q 6 hours while NPO prior to TPN  Once TPN hung, start intensive insulin protocol with hourly BG monitoring to determine insulin needs  Plan to convert to transition IV insulin infusion tomorrow morning once BG stable  Please notify endocrine with any BG related issues

## 2018-12-03 NOTE — ASSESSMENT & PLAN NOTE
- Paracentesis 10/1, 5L removed, no fluid sent for analysis.  - Pt remains volume overloaded.   - Liver US 11/6 with severe ascites.   - Paracentesis 11/7 with 8.9 L removed. Wbc 39, segs 16%. Gram stain - no organisms seen and no WBC. Cultures NGTD.  - Paracentesis 11/12: 8.6 L removed. 34 wbc, 25% segs, 33 lymphs, 42 monocytes/macrophages. Cultures NGTD  - Paracentesis 11/21 with 4 L removed, neg for infection.  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 Milky appearance, cell count of fluid (51% lymphs), triglycerides 2208, consistent with chyle leak.   - Peritoneal fluid 11/29 growing enterococcus and will plan for treatment with Vanc per ID recs.   - Attempted low fat, high protein diet due to suspicion for chyle leak with no improvement seen in fluid character.  - Will place PICC line and start TPN tonight 12/3.  - CT abd/pelvis 12/3 shows small volume abdominal ascites, improved compared to prior, pelvis ascites unchanged from prior, unchanged b/l pleural effusions (large on R, moderate on L) with bibasilar compressive atelectasis and consolidative changes.

## 2018-12-03 NOTE — PLAN OF CARE
Problem: Physical Therapy Goal  Goal: Physical Therapy Goal  Goals to be met by: 2018    Patient will increase functional independence with mobility by performin. Supine to sit with Contact Guard Assistance - not met  2. Sit to stand transfer with Supervision using LRAD or no AD - not met  3. Bed to chair transfer with Supervision using LRAD or no AD - not met  4. Gait  x 250 feet with Supervision using LRAD or no AD - not met  5. Stand for 3 minutes with Supervision to increase activity tolerance - not met   6. Lower extremity exercise program x15 reps per handout, with independence - not met           Outcome: Ongoing (interventions implemented as appropriate)  Progressing well towards goals    Marcelino Bird DPT  12/3/2018

## 2018-12-03 NOTE — PROGRESS NOTES
Called into patient's room by . Patient not feeling well again - BG 40. Awaiting CXR to verify PICC placement. Additional PIV access obtained per ELIUD Guadalupe RN. Amp D50W administered. Endocrine paged.    [id number] Spoke with endocrine fellow re: above. Will continue to monitor.

## 2018-12-03 NOTE — PROGRESS NOTES
[facility]  Infectious Disease  Progress Note    Patient Name: Scarlett Reddy  MRN: [id number]  Admission Date: 10/1/2018  Length of Stay: 62 days  Attending Physician: Jani Theodore MD  Primary Care Provider: Primary Doctor No    Isolation Status: Contact  Assessment/Plan:      Chylous ascites    - 67 y/o female with PMHx Hepatitis B, D cirrhosis.   - Admitted 10/1/18 underwent liver transplant 10/5/18 CMV D+/R+ steroid induction on maintenance MM/tacro/pred.  - now with possible Chyle leak fluid with milky appearance predominately lymphocytes 175 WBC 18% segs.   - cultures from sample growin GPCs that resulted as Enterococcus faecium sensitive to vanc   - Recommend at this time to continue Vanc at adjusted dose for 10 day course then resample ascitic fluid for cultures to determine if organism cleared    Recommendations   - continue vancomycin 10 day course 12/9/18  - repeat ascitic fluid sample for aerobic culture in 10 days  - Send weekly CBC, BMP to ID clinic fax   - Will need ID follow up in 2 weeks, please schedule  - Thanks for the consult will sign off please call with questions or new developments                    Anticipated Disposition:     Thank you for your consult. I will sign off. Please contact us if you have any additional questions.    Lisa Xavier MD  Infectious Disease  [facility]    Subjective:     Principal Problem:Liver transplanted    HPI: Case of 67 y/o female with PMHx Hepatitis B, D cirrhosis. Admitted 10/1/18 underwent liver transplant 10/5/18 CMV D+/R+ steroid induction on maintenance MM/tacro/pred. She was taken back to the OR on 10/6 due to increasing LFT's and suspected biliary leak.  On revision no leak was identified. Patient has been on pip/tazo to cover possible infections and on ppx therapy with bactrim, fluconazole, valgancyclovir; on entecavir for hepatitis B. ID consulted to comment on ESBL Kelb UTI and diphtheroids  from ascitic fluid.    Interval History: ID called back due to positive culture from chyle leak growing GPCs resembling Strep     Review of Systems   Constitutional: Negative for chills, fatigue and fever.   Gastrointestinal: Positive for abdominal distention and abdominal pain. Negative for diarrhea, nausea and vomiting.     Objective:     Vital Signs (Most Recent):  Temp: 98.6 °F (37 °C) (12/03/18 1551)  Pulse: (!) 111 (12/03/18 0830)  Resp: 17 (12/03/18 0830)  BP: 114/83 (12/03/18 0830)  SpO2: 96 % (12/03/18 0830) Vital Signs (24h Range):  Temp:  [98.3 °F (36.8 °C)-98.6 °F (37 °C)] 98.6 °F (37 °C)  Pulse:  [103-112] 111  Resp:  [16-21] 17  SpO2:  [92 %-97 %] 96 %  BP: (114-136)/(74-86) 114/83     Weight: 58.6 kg (129 lb 3 oz)  Body mass index is 22.88 kg/m².    Estimated Creatinine Clearance: 39.9 mL/min (based on SCr of 1.1 mg/dL).    Physical Exam   Constitutional: She is oriented to person, place, and time. She appears well-developed and well-nourished.   HENT:   Head: Normocephalic and atraumatic.   Eyes: EOM are normal. Pupils are equal, round, and reactive to light.   Neck: Normal range of motion.   Cardiovascular: Normal rate, regular rhythm and normal heart sounds.   Pulmonary/Chest: Effort normal and breath sounds normal.   Abdominal: Soft. Bowel sounds are normal.   Musculoskeletal: Normal range of motion. She exhibits no edema.   Neurological: She is alert and oriented to person, place, and time.   Skin: No rash noted.       Significant Labs:   Blood Culture:   Recent Labs   Lab 09/17/18  1050 09/17/18  1200 10/15/18  1332 10/16/18  1839 10/16/18  1907   LABBLOO No growth after 5 days. No growth after 5 days. No growth after 5 days. No growth after 5 days. No growth after 5 days.     BMP:   Recent Labs   Lab 12/03/18  0821   *      K 3.4*   CL 95   CO2 31*   BUN 57*   CREATININE 1.1   CALCIUM 8.6*   MG 1.4*     CBC:   Recent Labs   Lab 12/02/18  0644 12/03/18  0821   WBC 2.74* 2.86*   HGB  8.0* 8.5*   HCT 26.1* 26.3*   * 121*     CMP:   Recent Labs   Lab 12/02/18  0644 12/03/18  0821    138   K 3.3* 3.4*   CL 96 95   CO2 32* 31*   * 191*   BUN 63* 57*   CREATININE 1.0 1.1   CALCIUM 8.7 8.6*   PROT 4.7* 5.1*   ALBUMIN 2.6* 2.7*   BILITOT 1.2* 1.4*   ALKPHOS 122 127   AST 18 21   ALT 9* 15   ANIONGAP 11 12   EGFRNONAA 57.6* 51.3*     Microbiology Results (last 7 days)     Procedure Component Value Units Date/Time    Fungus culture [[id number]] Collected:  11/29/18 1229    Order Status:  Completed Specimen:  Body Fluid from Abdomen Updated:  12/03/18 0949     Fungus (Mycology) Culture Culture in progress    Culture, Body Fluid - Bactec [[id number]]  (Susceptibility) Collected:  11/29/18 1229    Order Status:  Completed Specimen:  Body Fluid from Peritoneal Fluid Updated:  12/02/18 1344     Body Fluid Culture, Sterile Gram stain: Gram positive cocci in chains resembling Strep     Body Fluid Culture, Sterile ENTEROCOCCUS FAECIUM    Gram stain [[id number]] Collected:  11/29/18 1229    Order Status:  Completed Specimen:  Body Fluid from Abdomen Updated:  11/29/18 1758     Gram Stain Result Rare WBC's      No organisms seen    Fungus culture [[id number]]     Order Status:  Completed Specimen:  Abdominal from Abdomen     Culture, Anaerobic [[id number]] Collected:  11/21/18 1545    Order Status:  Completed Specimen:  Body Fluid from Peritoneal Fluid Updated:  11/28/18 0749     Anaerobic Culture No anaerobes isolated    Fungus culture [[id number]] Collected:  11/21/18 1545    Order Status:  Completed Specimen:  Body Fluid from Peritoneal Fluid Updated:  11/27/18 1122     Fungus (Mycology) Culture Culture in progress    Fungus culture [[id number]] Collected:  11/12/18 1542    Order Status:  Completed Specimen:  Ascites from Abdomen Updated:  11/27/18 1024     Fungus (Mycology) Culture Culture in progress     Fungus (Mycology) Culture No fungus isolated after 2 weeks    Fungus culture [[id number]]  Collected:  11/07/18 1435    Order Status:  Completed Specimen:  Abdominal from Abdomen Updated:  11/27/18 1001     Fungus (Mycology) Culture Culture in progress     Fungus (Mycology) Culture No fungus isolated after 2 weeks          Significant Imaging: I have reviewed all pertinent imaging results/findings within the past 24 hours.

## 2018-12-03 NOTE — SUBJECTIVE & OBJECTIVE
Scheduled Meds:   aspirin  81 mg Oral Daily    atovaquone  1,500 mg Oral Daily    entecavir  0.5 mg Oral Daily    furosemide  40 mg Oral BID    heparin (porcine)  5,000 Units Subcutaneous Q8H    insulin aspart U-100  7-14 Units Subcutaneous TIDWM    insulin detemir U-100  10 Units Subcutaneous Daily    k phos di & mono-sod phos mono  2 tablet Oral BID    magnesium oxide  800 mg Oral BID    metoprolol tartrate  25 mg Oral BID    octreotide  50 mcg Subcutaneous Q8H    pantoprazole  40 mg Oral BID    predniSONE  2.5 mg Oral Daily    tacrolimus  2 mg Oral BID    vancomycin (VANCOCIN) IVPB  750 mg Intravenous Q24H     Continuous Infusions:   TPN ADULT CENTRAL LINE CUSTOM       PRN Meds:sodium chloride, acetaminophen, bisacodyl, calcium carbonate, dextrose 50%, dextrose 50%, glucagon (human recombinant), glucose, glucose, insulin aspart U-100, omnipaque, ondansetron, ondansetron, polyethylene glycol, sodium chloride 0.9%, traMADol    Review of Systems   Constitutional: Positive for activity change and appetite change (improving). Negative for chills, fatigue and fever.   HENT: Negative for congestion, facial swelling and trouble swallowing.    Eyes: Negative for pain, discharge and visual disturbance.   Respiratory: Negative for cough, chest tightness, shortness of breath and wheezing.    Cardiovascular: Positive for leg swelling. Negative for chest pain and palpitations.   Gastrointestinal: Positive for abdominal distention and abdominal pain. Negative for constipation, diarrhea, nausea and vomiting.   Endocrine: Negative.    Genitourinary: Negative for decreased urine volume, difficulty urinating and dysuria.   Musculoskeletal: Positive for back pain.Negative for arthralgias and neck stiffness.   Skin: Positive for wound.   Allergic/Immunologic: Positive for immunocompromised state.   Neurological: Positive for weakness. Negative for tremors and headaches.   Psychiatric/Behavioral: Negative for confusion  and sleep disturbance.     Objective:     Vital Signs (Most Recent):  Temp: 98.4 °F (36.9 °C) (12/03/18 1054)  Pulse: (!) 111 (12/03/18 0830)  Resp: 17 (12/03/18 0830)  BP: 114/83 (12/03/18 0830)  SpO2: 96 % (12/03/18 0830) Vital Signs (24h Range):  Temp:  [98.3 °F (36.8 °C)-99.6 °F (37.6 °C)] 98.4 °F (36.9 °C)  Pulse:  [103-113] 111  Resp:  [16-21] 17  SpO2:  [92 %-97 %] 96 %  BP: (114-136)/(74-86) 114/83     Weight: 58 kg (127 lb 13.9 oz)  Body mass index is 22.65 kg/m².    Intake/Output - Last 3 Shifts       12/01 0700 - 12/02 0659 12/02 0700 - 12/03 0659 12/03 0700 - 12/04 0659    P.O. 470 840     I.V. (mL/kg) 0 (0) 0 (0)     Other 0 0     IV Piggyback 250 250     Total Intake(mL/kg) 720 (12.4) 1090 (18.8)     Urine (mL/kg/hr) 1400 (1) 0 (0)     Emesis/NG output 0 0     Drains 550 670 70    Other 0 0     Stool 0 0     Blood 0 0     Total Output 1950 670 70    Net -1230 +420 -70           Urine Occurrence 3 x 6 x     Stool Occurrence 5 x 3 x     Emesis Occurrence 0 x 0 x           Physical Exam   Constitutional: She is oriented to person, place, and time. She appears well-developed. No distress.   Temporal and distal extremity muscle wasting   HENT:   Head: Normocephalic and atraumatic.   Eyes: EOM are normal. No scleral icterus.   Neck: Normal range of motion.   Cardiovascular: Normal rate, regular rhythm and normal heart sounds.   No murmur heard.  Pulmonary/Chest: Effort normal. No respiratory distress. She has no wheezes.   Breath sounds decreased at bases   Abdominal: Soft. Bowel sounds are normal. She exhibits distension. There is tenderness. There is no guarding.   Wound vac placed to middle chevron incision + R lateral side, no s/s/i  Erythema + induration RLQ, improving  RLQ VALORIE drain sites c/d/i with sutures   Musculoskeletal: Normal range of motion. She exhibits edema (3+ BLE, + generalized).   Neurological: She is alert and oriented to person, place, and time.   Skin: Skin is warm. She is not  diaphoretic.   Psychiatric: She has a normal mood and affect.   Nursing note and vitals reviewed.      Laboratory:  Immunosuppressants         Stop Route Frequency     tacrolimus capsule 2 mg      -- Oral 2 times daily        CBC:   Recent Labs   Lab 12/03/18  0821   WBC 2.86*   RBC 2.97*   HGB 8.5*   HCT 26.3*   *   MCV 89   MCH 28.6   MCHC 32.3     CMP:   Recent Labs   Lab 12/03/18  0821   *   CALCIUM 8.6*   ALBUMIN 2.7*   PROT 5.1*      K 3.4*   CO2 31*   CL 95   BUN 57*   CREATININE 1.1   ALKPHOS 127   ALT 15   AST 21   BILITOT 1.4*     Labs within the past 24 hours have been reviewed.    Diagnostic Results:  I have personally reviewed all pertinent imaging studies.Scheduled Meds:   aspirin  81 mg Oral Daily    atovaquone  1,500 mg Oral Daily    entecavir  0.5 mg Oral Daily    furosemide  40 mg Oral BID    heparin (porcine)  5,000 Units Subcutaneous Q8H    insulin aspart U-100  7-14 Units Subcutaneous TIDWM    insulin detemir U-100  10 Units Subcutaneous Daily    k phos di & mono-sod phos mono  2 tablet Oral BID    magnesium oxide  800 mg Oral BID    metoprolol tartrate  25 mg Oral BID    octreotide  50 mcg Subcutaneous Q8H    pantoprazole  40 mg Oral BID    predniSONE  2.5 mg Oral Daily    tacrolimus  2 mg Oral BID    vancomycin (VANCOCIN) IVPB  750 mg Intravenous Q24H     Continuous Infusions:   TPN ADULT CENTRAL LINE CUSTOM       PRN Meds:sodium chloride, acetaminophen, bisacodyl, calcium carbonate, dextrose 50%, dextrose 50%, glucagon (human recombinant), glucose, glucose, insulin aspart U-100, omnipaque, ondansetron, ondansetron, polyethylene glycol, sodium chloride 0.9%, traMADol    Review of Systems    Objective:     Vital Signs (Most Recent):  Temp: 98.4 °F (36.9 °C) (12/03/18 1054)  Pulse: (!) 111 (12/03/18 0830)  Resp: 17 (12/03/18 0830)  BP: 114/83 (12/03/18 0830)  SpO2: 96 % (12/03/18 0830) Vital Signs (24h Range):  Temp:  [98.3 °F (36.8 °C)-99.6 °F (37.6 °C)] 98.4  °F (36.9 °C)  Pulse:  [103-113] 111  Resp:  [16-21] 17  SpO2:  [92 %-97 %] 96 %  BP: (114-136)/(74-86) 114/83     Weight: 58 kg (127 lb 13.9 oz)  Body mass index is 22.65 kg/m².    Intake/Output - Last 3 Shifts       12/01 0700 - 12/02 0659 12/02 0700 - 12/03 0659 12/03 0700 - 12/04 0659    P.O. 470 840     I.V. (mL/kg) 0 (0) 0 (0)     Other 0 0     IV Piggyback 250 250     Total Intake(mL/kg) 720 (12.4) 1090 (18.8)     Urine (mL/kg/hr) 1400 (1) 0 (0)     Emesis/NG output 0 0     Drains 550 670 70    Other 0 0     Stool 0 0     Blood 0 0     Total Output 1950 670 70    Net -1230 +420 -70           Urine Occurrence 3 x 6 x     Stool Occurrence 5 x 3 x     Emesis Occurrence 0 x 0 x           Physical Exam      Laboratory:  Immunosuppressants         Stop Route Frequency     tacrolimus capsule 2 mg      -- Oral 2 times daily        CBC:   Recent Labs   Lab 12/03/18  0821   WBC 2.86*   RBC 2.97*   HGB 8.5*   HCT 26.3*   *   MCV 89   MCH 28.6   MCHC 32.3     CMP:   Recent Labs   Lab 12/03/18  0821   *   CALCIUM 8.6*   ALBUMIN 2.7*   PROT 5.1*      K 3.4*   CO2 31*   CL 95   BUN 57*   CREATININE 1.1   ALKPHOS 127   ALT 15   AST 21   BILITOT 1.4*     Coagulation: No results for input(s): PT, INR, APTT in the last 168 hours.  Labs within the past 24 hours have been reviewed.    Diagnostic Results:  I have personally reviewed all pertinent imaging studies.

## 2018-12-03 NOTE — PROGRESS NOTES
CT abdomen/pelvis ordered for this am. Patient and  updated on plan of care with assistance from , Dunia. Both patient and her  deny any needs at this time. Will continue to monitor.

## 2018-12-03 NOTE — ASSESSMENT & PLAN NOTE
- 69 y/o female with PMHx Hepatitis B, D cirrhosis.   - Admitted 10/1/18 underwent liver transplant 10/5/18 CMV D+/R+ steroid induction on maintenance MM/tacro/pred.  - now with possible Chyle leak fluid with milky appearance predominately lymphocytes 175 WBC 18% segs.   - cultures from sample growin GPCs that resulted as Enterococcus faecium sensitive to vanc   - Recommend at this time to continue Vanc at adjusted dose for 10 day course then resample ascitic fluid for cultures to determine if organism cleared    Recommendations   - continue vancomycin 10 day course 12/9/18  - repeat ascitic fluid sample for aerobic culture in 10 days  - Send weekly CBC, BMP to ID clinic fax   - Will need ID follow up in 2 weeks, please schedule  - Thanks for the consult will sign off please call with questions or new developments

## 2018-12-03 NOTE — PT/OT/SLP PROGRESS
"Physical Therapy Treatment    Patient Name:  Scarlett Reddy   MRN:  [id number]    Recommendations:     Discharge Recommendations:  home health PT, home health OT   Discharge Equipment Recommendations: walker, rolling   Barriers to discharge: None    Assessment:     Scarlett Reddy is a 68 y.o. female admitted with a medical diagnosis of Liver transplanted.  She presents with the following impairments/functional limitations:  weakness, impaired functional mobilty, impaired endurance, gait instability, impaired self care skills, edema.  Pt tolerated session c min c/o BLE fatigue.  Performed transfer c min A and gait c CGA using RW.  Pt demo improvements in ability to transfer from bedside chair - able to consistently transfer c min A and demo proper technique c hand place/forward lean.  Pt ambulating household distance (160ft) and demo decreased speed, narrow SINA, flat foot contact, decreased toe/floor clearance, downward gaze and FFP requiring mod cues to correct, min c/o fatigue.  Pt safe to ambulate c assistance of 1 person and RW.  Pt would benefit from continued skilled acute PT 3x/wk to improve functional mobility.      Rehab Prognosis:  good; patient would benefit from acute skilled PT services to address these deficits and reach maximum level of function.      Recent Surgery: Procedure(s) (LRB):  ANGIOGRAM-HEPATIC (Left) 14 Days Post-Op    Plan:     During this hospitalization, patient to be seen 4 x/week to address the above listed problems via gait training, therapeutic activities, therapeutic exercises, neuromuscular re-education  · Plan of Care Expires:  12/15/18   Plan of Care Reviewed with: patient, spouse()    Subjective     Communicated with RN and OT prior to session.  Patient found UIC and  present upon PT entry to room, agreeable to treatment.      Chief Complaint: BLE fatigue  Patient comments/goals: "Standing up is the most difficulty thing for me." - pt per "   Pain/Comfort:  · Pain Rating 1: 0/10    Patients cultural, spiritual, Jew conflicts given the current situation: none    Objective:     Patient found with: VALORIE drain, peripheral IV, telemetry, wound vac     General Precautions: Standard, fall, contact   Orthopedic Precautions:N/A   Braces: N/A     Functional Mobility:  · Transfers:     · Sit to Stand:  minimum assistance with arm rest  · Gait: 160ft c RW CGA   · decreased speed, narrow SINA, flat foot contact, decreased toe/floor clearance, downward gaze and FFP requiring mod cues to correct, min c/o fatigue  · Balance: sitting (I); standing (CGA)      AM-PAC 6 CLICK MOBILITY  Turning over in bed (including adjusting bedclothes, sheets and blankets)?: 3  Sitting down on and standing up from a chair with arms (e.g., wheelchair, bedside commode, etc.): 3  Moving from lying on back to sitting on the side of the bed?: 3  Moving to and from a bed to a chair (including a wheelchair)?: 3  Need to walk in hospital room?: 3  Climbing 3-5 steps with a railing?: 2  Basic Mobility Total Score: 17       Therapeutic Activities and Exercises:  Pt educated on: progress; safety c mobility; benefits of OOB activities; performing therex; d/c recs - v/u  Sit<>stand: 10x from bedside chair c use of armrests  Weight-shifting in standing x3mins  Therex (AP, LAQ, hip flex) 1x10 ea      Patient left up in chair with all lines intact, call button in reach, RN notified and  and  present..    GOALS:   Multidisciplinary Problems     Physical Therapy Goals        Problem: Physical Therapy Goal    Goal Priority Disciplines Outcome Goal Variances Interventions   Physical Therapy Goal     PT, PT/OT Ongoing (interventions implemented as appropriate)     Description:  Goals to be met by: 2018    Patient will increase functional independence with mobility by performin. Supine to sit with Contact Guard Assistance - not met  2. Sit to stand transfer with  Supervision using LRAD or no AD - not met  3. Bed to chair transfer with Supervision using LRAD or no AD - not met  4. Gait  x 250 feet with Supervision using LRAD or no AD - not met  5. Stand for 3 minutes with Supervision to increase activity tolerance - not met   6. Lower extremity exercise program x15 reps per handout, with independence - not met                             Time Tracking:     PT Received On: 12/03/18  PT Start Time: 1322     PT Stop Time: 1340  PT Total Time (min): 18 min     Billable Minutes: Gait Training 18 min    Treatment Type: Treatment  PT/PTA: PT     PTA Visit Number: 1     Marcelino Bird, PT  12/03/2018

## 2018-12-03 NOTE — PROGRESS NOTES
[facility]  Liver Transplant  Progress Note    Patient Name: Scarlett Reddy  MRN: [id number]  Admission Date: 10/1/2018  Hospital Length of Stay: 62 days  Code Status: Full Code  Primary Care Provider: Primary Doctor No  Post-Operative Day: 59    ORGAN:   LIVER  Disease Etiology: Cirrhosis: Type B and D  Donor Type:    - Brain Death  CDC High Risk:   No  Donor CMV Status:   Donor CMV Status: Positive  Donor HBcAB:   Negative  Donor HCV Status:   Negative  Donor HBV SETH: Negative  Donor HCV SETH: Negative  Whole or Partial: Whole Liver  Biliary Anastomosis: End to End  Arterial Anatomy: Replaced Left Hepatic and Right Hepatic  Subjective:     History of Present Illness:  Ms. Reddy is a 69 y/o female with PMH of ESLD secondary Hep B and D.  Listed for liver transplant with MELD 23.  Paracentesis 10/1 with 5L removed, no fluid sent for analysis.  Morning labs significant for hyponatremia, Na 119.  Pt admitted to LTS for sodium monitoring.      Hospital Course: 69 y/o F h/o HBV/delta cirrhosis s/p DBDLT 10/5/18 (CMV D+/R+, steroid induction, MMF/tacro/pred maintenance) with take back on 10/6 2/2 hyperbilirubinema and bilious VALORIE drainage, no leak identified. Post-op course c/b hypercapneic and hypoxic respiratory failure and was reintubated though quickly extubated now doing well. Liver bx (10/6) sig for Zone 3 hemorrhage and collapse, in addition to cholestasis. Transferred from ICU on POD #4. ID consulted to comment on positive diphtheroid culture from ascitic fluid (likely skin colonization) as well as ESBL Klebsiella pneumoniae in urine culture (10/4).  Pt asymptomatic and cell count from ascitic fluid unremarkable. Per ID recs, no need to treat diphtheroids or asymptomatic ESBL Kleb pneumo bacteriuria though pt has had 6 days of therapy which would cover these organisms. Mild peritransplant ascites- repeat US 10/6 with increased arterial resistive indices. Repeat US 10/9 w/ continued  elevation in RIs and fluid collections. LFTs trending down nicely.     Acute gradual worsening of renal function s/p OLTx. Creatinine on arrival 0.8 and has been up trending. Nephrology consulted for post-op EVA. Cr cont to trend up but remains to have good UOP. Had stable response to lasiz diuresis.  BUN elevated requiring multiple rounds of dialysis (10/15, 10/17, 10/20, 10/31). No significant improvements in kidney function noted.   In addition, pt H/H cont to fluctuate requiring multiple blood transfusions (10/14, 10/16, 10/18, 10/19, 10/22, 10/27, 10/29).  Pt reported dark stools 10/15 but color has normalized. FOBT+. EGD 10/18 consistent with ulcerated mucosa in duodenum s/p coagulation. Concern for H. Pylori. Started Amoxicillin/levofloxacin (10/19, complete 11/2)). Remains on Protonix 40 mg BID. Biopsy negative for infection and CMV. Will cont close monitoring H/H, transfuse as needed with goal Hb > 7.0. Of note, Urine cx + Klebsiella pneumoniae (10/13). Received 3 day course of ciprofloxacin, dc'd 10/19.   On 10/19 pm pt c/o crushing chest pain. Troponin 0.094, likely 2/2 ischemic stress. EKG NSR.   Liver US 11/6 showed no arterial flow. Paracentesis 11/7 with 8.9 L removed. Wbc 39, segs 16%. Gram stain no organisms seen + no WBC. Pt feels better since having fluid removed but she continues to have worsening abdominal distention & LE edema.Repeat Liver US 11/8 showed arterial flow with increased RIs. Paracentesis 11/12 with 8.6L removed (WBC 34, segs 25%). 11/15 liver u/s showed abnormally decreased main hepatic artery peak systolic velocity with slow arterial flow and abnormal tardus parvus waveforms intrahepatically. Consulted vascular surgery. Repeat liver US 11/19 showed no arterial flow on R and sluggish flow on L. Hepatic angiogram 11/19 confirmed HAT.  Friday 11/16, purulent drainage noted from middle of chevron incision. Area opened and packed. Cx grew klebsiella p. CT scan showed no signs of  infection. On Monday 11/18, it was noted that the right/lateral side of the chevron had 3 small openings draining purulent fluid. Opened the area and packed. Vac placed on 11/24. Wound care following.    Interval history:  No acute events overnight. Pt reports feeling well this am. Pt attempted low fat diet over the weekend, but drain still appears chylous in nature. Will place PICC line, make pt NPO, and start TPN tonight. Also started Octreotide to treat chyle leak. Endocrine aware of TPN and plan to start insulin drip and monitor closely. Explained treatment plan to pt & her  with  present and they expressed understanding. CT abd/pelvis shows small volume abdominal ascites, improved compared to prior, pelvis ascites unchanged from prior, unchanged b/l pleural effusions (large on R, moderate on L) with bibasilar compressive atelectasis and consolidative changes. Continue Lasix 40 mg BID. Will give albumin x 2. Patient finished day 7 of 7 of Ertapenem for kleb pneumo ESBL UTI 11/29. Of note, pt grew same bacteria from wound 11/16. Peritoneal fluid 11/29 w/ enterococcus. Per ID will treat with Vanc at this time. Monitor.     Scheduled Meds:   aspirin  81 mg Oral Daily    atovaquone  1,500 mg Oral Daily    entecavir  0.5 mg Oral Daily    furosemide  40 mg Oral BID    heparin (porcine)  5,000 Units Subcutaneous Q8H    insulin aspart U-100  7-14 Units Subcutaneous TIDWM    insulin detemir U-100  10 Units Subcutaneous Daily    k phos di & mono-sod phos mono  2 tablet Oral BID    magnesium oxide  800 mg Oral BID    metoprolol tartrate  25 mg Oral BID    octreotide  50 mcg Subcutaneous Q8H    pantoprazole  40 mg Oral BID    predniSONE  2.5 mg Oral Daily    tacrolimus  2 mg Oral BID    vancomycin (VANCOCIN) IVPB  750 mg Intravenous Q24H     Continuous Infusions:   TPN ADULT CENTRAL LINE CUSTOM       PRN Meds:sodium chloride, acetaminophen, bisacodyl, calcium carbonate, dextrose 50%,  dextrose 50%, glucagon (human recombinant), glucose, glucose, insulin aspart U-100, omnipaque, ondansetron, ondansetron, polyethylene glycol, sodium chloride 0.9%, traMADol    Review of Systems   Constitutional: Positive for activity change and appetite change (improving). Negative for chills, fatigue and fever.   HENT: Negative for congestion, facial swelling and trouble swallowing.    Eyes: Negative for pain, discharge and visual disturbance.   Respiratory: Negative for cough, chest tightness, shortness of breath and wheezing.    Cardiovascular: Positive for leg swelling. Negative for chest pain and palpitations.   Gastrointestinal: Positive for abdominal distention and abdominal pain. Negative for constipation, diarrhea, nausea and vomiting.   Endocrine: Negative.    Genitourinary: Negative for decreased urine volume, difficulty urinating and dysuria.   Musculoskeletal: Positive for back pain.Negative for arthralgias and neck stiffness.   Skin: Positive for wound.   Allergic/Immunologic: Positive for immunocompromised state.   Neurological: Positive for weakness. Negative for tremors and headaches.   Psychiatric/Behavioral: Negative for confusion and sleep disturbance.     Objective:     Vital Signs (Most Recent):  Temp: 98.4 °F (36.9 °C) (12/03/18 1054)  Pulse: (!) 111 (12/03/18 0830)  Resp: 17 (12/03/18 0830)  BP: 114/83 (12/03/18 0830)  SpO2: 96 % (12/03/18 0830) Vital Signs (24h Range):  Temp:  [98.3 °F (36.8 °C)-99.6 °F (37.6 °C)] 98.4 °F (36.9 °C)  Pulse:  [103-113] 111  Resp:  [16-21] 17  SpO2:  [92 %-97 %] 96 %  BP: (114-136)/(74-86) 114/83     Weight: 58 kg (127 lb 13.9 oz)  Body mass index is 22.65 kg/m².    Intake/Output - Last 3 Shifts       12/01 0700 - 12/02 0659 12/02 0700 - 12/03 0659 12/03 0700 - 12/04 0659    P.O. 470 840     I.V. (mL/kg) 0 (0) 0 (0)     Other 0 0     IV Piggyback 250 250     Total Intake(mL/kg) 720 (12.4) 1090 (18.8)     Urine (mL/kg/hr) 1400 (1) 0 (0)     Emesis/NG output 0 0      Drains 550 670 70    Other 0 0     Stool 0 0     Blood 0 0     Total Output 1950 670 70    Net -1230 +420 -70           Urine Occurrence 3 x 6 x     Stool Occurrence 5 x 3 x     Emesis Occurrence 0 x 0 x           Physical Exam   Constitutional: She is oriented to person, place, and time. She appears well-developed. No distress.   Temporal and distal extremity muscle wasting   HENT:   Head: Normocephalic and atraumatic.   Eyes: EOM are normal. No scleral icterus.   Neck: Normal range of motion.   Cardiovascular: Normal rate, regular rhythm and normal heart sounds.   No murmur heard.  Pulmonary/Chest: Effort normal. No respiratory distress. She has no wheezes.   Breath sounds decreased at bases   Abdominal: Soft. Bowel sounds are normal. She exhibits distension. There is tenderness. There is no guarding.   Wound vac placed to middle chevron incision + R lateral side, no s/s/i  Erythema + induration RLQ, improving  RLQ VALORIE drain sites c/d/i with sutures   Musculoskeletal: Normal range of motion. She exhibits edema (3+ BLE, + generalized).   Neurological: She is alert and oriented to person, place, and time.   Skin: Skin is warm. She is not diaphoretic.   Psychiatric: She has a normal mood and affect.   Nursing note and vitals reviewed.      Laboratory:  Immunosuppressants         Stop Route Frequency     tacrolimus capsule 2 mg      -- Oral 2 times daily        CBC:   Recent Labs   Lab 12/03/18  0821   WBC 2.86*   RBC 2.97*   HGB 8.5*   HCT 26.3*   *   MCV 89   MCH 28.6   MCHC 32.3     CMP:   Recent Labs   Lab 12/03/18  0821   *   CALCIUM 8.6*   ALBUMIN 2.7*   PROT 5.1*      K 3.4*   CO2 31*   CL 95   BUN 57*   CREATININE 1.1   ALKPHOS 127   ALT 15   AST 21   BILITOT 1.4*     Labs within the past 24 hours have been reviewed.    Diagnostic Results:  I have personally reviewed all pertinent imaging studies.Scheduled Meds:   aspirin  81 mg Oral Daily    atovaquone  1,500 mg Oral Daily     entecavir  0.5 mg Oral Daily    furosemide  40 mg Oral BID    heparin (porcine)  5,000 Units Subcutaneous Q8H    insulin aspart U-100  7-14 Units Subcutaneous TIDWM    insulin detemir U-100  10 Units Subcutaneous Daily    k phos di & mono-sod phos mono  2 tablet Oral BID    magnesium oxide  800 mg Oral BID    metoprolol tartrate  25 mg Oral BID    octreotide  50 mcg Subcutaneous Q8H    pantoprazole  40 mg Oral BID    predniSONE  2.5 mg Oral Daily    tacrolimus  2 mg Oral BID    vancomycin (VANCOCIN) IVPB  750 mg Intravenous Q24H     Continuous Infusions:   TPN ADULT CENTRAL LINE CUSTOM       PRN Meds:sodium chloride, acetaminophen, bisacodyl, calcium carbonate, dextrose 50%, dextrose 50%, glucagon (human recombinant), glucose, glucose, insulin aspart U-100, omnipaque, ondansetron, ondansetron, polyethylene glycol, sodium chloride 0.9%, traMADol    Review of Systems    Objective:     Vital Signs (Most Recent):  Temp: 98.4 °F (36.9 °C) (12/03/18 1054)  Pulse: (!) 111 (12/03/18 0830)  Resp: 17 (12/03/18 0830)  BP: 114/83 (12/03/18 0830)  SpO2: 96 % (12/03/18 0830) Vital Signs (24h Range):  Temp:  [98.3 °F (36.8 °C)-99.6 °F (37.6 °C)] 98.4 °F (36.9 °C)  Pulse:  [103-113] 111  Resp:  [16-21] 17  SpO2:  [92 %-97 %] 96 %  BP: (114-136)/(74-86) 114/83     Weight: 58 kg (127 lb 13.9 oz)  Body mass index is 22.65 kg/m².    Intake/Output - Last 3 Shifts       12/01 0700 - 12/02 0659 12/02 0700 - 12/03 0659 12/03 0700 - 12/04 0659    P.O. 470 840     I.V. (mL/kg) 0 (0) 0 (0)     Other 0 0     IV Piggyback 250 250     Total Intake(mL/kg) 720 (12.4) 1090 (18.8)     Urine (mL/kg/hr) 1400 (1) 0 (0)     Emesis/NG output 0 0     Drains 550 670 70    Other 0 0     Stool 0 0     Blood 0 0     Total Output 1950 670 70    Net -1230 +420 -70           Urine Occurrence 3 x 6 x     Stool Occurrence 5 x 3 x     Emesis Occurrence 0 x 0 x           Physical Exam      Laboratory:  Immunosuppressants         Stop Route Frequency      "tacrolimus capsule 2 mg      -- Oral 2 times daily        CBC:   Recent Labs   Lab 12/03/18  0821   WBC 2.86*   RBC 2.97*   HGB 8.5*   HCT 26.3*   *   MCV 89   MCH 28.6   MCHC 32.3     CMP:   Recent Labs   Lab 12/03/18  0821   *   CALCIUM 8.6*   ALBUMIN 2.7*   PROT 5.1*      K 3.4*   CO2 31*   CL 95   BUN 57*   CREATININE 1.1   ALKPHOS 127   ALT 15   AST 21   BILITOT 1.4*     Coagulation: No results for input(s): PT, INR, APTT in the last 168 hours.  Labs within the past 24 hours have been reviewed.    Diagnostic Results:  I have personally reviewed all pertinent imaging studies.    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   - POD 1 US: increased arterial resistive indices, suggestive of edema vs acute rejection vs congestion.  - Repeat US 10/9 with continued elevation of RIs.  - LFTs stable.  - T bili trending down.   - Repeat liver US (10/11): elevated RIs.  - Liver US 11/6 to assess for ascites. No arterial flow within the liver allograft concerning for interval thrombosis/occlusion of the arterial system. Severe ascites seen.  - Liver US 11/8: arterial flow seen w/ elevated RIs   - LFTs remain stable. No CTA at this time due to EVA and normal liver function.   - Liver US 11/15 showed "abnormally decreased main hepatic artery peak systolic velocity with slow arterial flow and abnormal tardus parvus waveforms intrahepatically."   -Vascular surgery consulted  - Liver US 11/19 showed no arterial flow on the right and sluggish flow on the left.   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 No further intervention planned at this time as patient's LFTs are stable. Will monitor.  - CT abd/pelvis 11/26 showed: 1. Persistent large volume of ascites. 2. Large right and moderate left pleural effusions with adjacent " basilar atelectasis. 3. Anasarca  - IR placed perc drain 11/29 due to suspicion for chyle leak. 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  - Fluid also growing enterococcus and will plan for treatment with Vanc per ID recs.        Tachycardia    - Pt w/ tachycardia but now improved.   - Metoprolol 12.5 mg BID for rate control. HR improved, but still elevated. BP stable.  - 11/30 -> increased Metoprolol to 25 mg BID   - Monitor closely.       Pleural effusion    - Common post liver transplant  - CT 11/26 showed: Large right and moderate left pleural effusions with adjacent basilar atelectasis.  - Pt sp02 stable on RA, but c/o some orthopnea.  - CT abd/pelvis 12/3 showed unchanged b/l pleural effusions (large on R, moderate on L) with bibasilar compressive atelectasis and consolidative changes.  - Continue w/ diuresis.       Chronic kidney disease, stage III (moderate)    - Kidney fx improving  - Monitor       Hypomagnesemia    - Continue PO Mag Oxide  - IV Mag 2g 12/3 for Mag of 1.4  - Continue to monitor.       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   - Continue to monitor.     Hypophosphatemia    - Continue k phos and monitor closely.       Delayed surgical wound healing    - Middle chevron incision opened 11/16 with purulent drainage  - Packed with Aquacel ag at this time. Growing Klebsiella p.   - CT abd/pelvis 11/17- no signs of infection  - 11/17: R side of chevron incision w/ 3 small holes draining purulent fluid, opened and packed w/ aquacel.   - Wound care following.  - Wound w/ increased drainage, so wound care placed vac 11/24/18.  Apprec recs. Extending Ertapenem thru 11/28 per ID.    - Abdomen erythematous and indurated RLQ inferior to chevron.  - CT abd/pelvis 11/26 - no sign of infection  - Last wound vac change 11/30.    .       Hepatic artery thrombosis of  "transplanted liver    - Liver US 11/19 showed no arterial flow to R and sluggish flow to L  - Hepatic angiogram 11/19 confirmed HAT  - No further intervention planned at this time. Will continue to monitor.     Other drug-induced pancytopenia    - Decreased plt, wbc, h/h  - See "leukopenia"  - Continue to monitor.     Thrombocytopenia, unspecified    - Monitor.       Alteration in skin integrity    - Monitor.       Leukopenia    - Stopped Cellcept, Bactrim, and Valcyte 10/31   - Cellcept half dose resumed 11/6  - Started Atovaquone 11/6.   - CMV PCR noted to be positive at 129. Will hold off on treatment with valcyte at this time. Monitoring weekly CMV PCRs.  - Cellcept held 11/23 2/2 infection (UTI + wound)  - Continue to monitor.       Azotemia    - CRRT 10/31 for uremic toxin clearance.           UTI (urinary tract infection)    - Urine Cx 10/15 w/ Klebsiella pneumonia.  - Started Ciprofloxacin (10/17-10/19).   - Started on amoxicillin/levo for H. Pylori (completed abx 11/2).  - Pt c/o lower back pain 11/22. UA w/ trace leuks, moderate wbs, 1+ blood, cx pending  - Pt symptomatic (back pain) & with ESBL in wound previously.  Ertapenem 1g QD x 3 days (started 11/23), per ID recs.    - Given pt cont to have back pain and concern for infection in wound ( thick drainage and increased drainage)-  extended Ertapenem for 4 more days per ID (stop date 11/29/18).         Acute blood loss anemia    - Expected post transplant  - H&H cont to fluctuate.   - FOBT +.  - EGD 10/18 - showed diffuse bleeding with ulcerated duodenal mucosa.  - EGD 11/5 - improvement seen, no active bleeding.  - Resume heparin sq injections 11/6.   - Decrease heparin sq from TID to BID 11/15   - Heparin dc'd 11/17  - See "anemia requiring transfusions."  - Continue to monitor.             Post LT - Acute renal failure    - Creatinine on arrival 0.8 and trended up.  - Nephrology consulted.  - Renal US (10/11): no hydronephrosis.  - Echo 10/12: " "diastolic dysfunction, likely 2/2 to ARF.  - CRRT 10/14; SLED 10/17, 10/20. --> no improvements noted in kidney fxn.  - 24 hr urine creatine collection: CrCl of 7.   - CRRT 10/31 with 1L removed.   - Cr level now with improvement.   - See "hypervolemia associated with renal insufficiency"     Hypervolemia associated with renal insufficiency    - CRRT 10/14, 10/17, 10/20, 10/31.  - CXR 10/30: abundant fluid in b/l pleural space.  - Continue 40 mg Lasix BID.  - Monitor daily weights.       EVA (acute kidney injury)    - No plans for RRT in near future, per nephrology  - Trialysis line removed 11/13  - Nephrology involved. Appreciate recs.  - Cr improved & stable.  - Continue to monitor.     At risk for opportunistic infections    - Valcyte for CMV prophylaxis--> holding (10/31) for leukopenia. Pt will need weekly CMV PCR while discontinued.  - CMV PCR noted to be positive at 129. .  - CMV PCR 11/8: 105  - CMV PCR 11/15 with 89 copies  - CMV PCR 11/23 95 - increased from previous. Continue to hold Valcyte & monitor pt closely.  - CMV PCR 11/29 146 - per ID, will continue to monitor w/ weekly PCRs.  - Bactrim for PCP prophylaxis ([facility]).--> holding (10/31) for leukopenia.   - Start Atovaquone 11/6.  - Fluconazole for fungal prophylaxis (first dose 10/6). D/c Fluc 11/16 bc pt had been on it for > 30 days post take back.       Long-term use of immunosuppressant medication    - Prograf: stopped 10/29. Resumed 11/2.  --> Will monitor for signs of toxic side effects, check daily troughs, and change meds accordingly.   - Prednisone: stopped 10/29. Started on Hydrocortisone. pred taper resumed.  - Cellcept: stopped 10/31 for leukopenia. Restarted Cellcept 500 mg bid, 11/6.  - Cellcept held 11/23 2/2 infection         Prophylactic immunotherapy    - See long term use immunosuppresion.       Other ascites    - Paracentesis 10/1, 5L removed, no fluid sent for analysis.  - Pt remains volume overloaded.   - Liver US 11/6 with severe " ascites.   - Paracentesis 11/7 with 8.9 L removed. Wbc 39, segs 16%. Gram stain - no organisms seen and no WBC. Cultures NGTD.  - Paracentesis 11/12: 8.6 L removed. 34 wbc, 25% segs, 33 lymphs, 42 monocytes/macrophages. Cultures NGTD  - Paracentesis 11/21 with 4 L removed, neg for infection.  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 Milky appearance, cell count of fluid (51% lymphs), triglycerides 2208, consistent with chyle leak.   - Peritoneal fluid 11/29 growing enterococcus and will plan for treatment with Vanc per ID recs.   - Attempted low fat, high protein diet due to suspicion for chyle leak with no improvement seen in fluid character.  - Will place PICC line and start TPN tonight 12/3.  - CT abd/pelvis 12/3 shows small volume abdominal ascites, improved compared to prior, pelvis ascites unchanged from prior, unchanged b/l pleural effusions (large on R, moderate on L) with bibasilar compressive atelectasis and consolidative changes.     Weakness    - See physical deconditioning.       Physical deconditioning    - PT/OT consulted.  Progressing well w/ therapy.  - Recommend home health PT and rolling walker at discharge (orders placed 11/6).       Hypotension    - d/c'd midodrine 11/28 as patient has not required it recently  - Continue to monitor.     Anemia requiring transfusions    - Fluctuating H&H.    - Transfusions: 10/14; 10/16; 10/18; 10/19; 10/22; 10/27; 10/29, 10/31, 11/3, 11/21, 11/29  - FOBT +  - LDH elevated, Hapto < 10.  - EGD 10/18: ulcerated duodenal mucosa.   - Consulted GI about this ongoing issue and further need of another EGD.  - EGD 11/5 unremarkable.   - Iron studies 11/13:  95, TIBC 107, sat iron 89, transferrin 72, ferritin pending  - Hemolytic anemia workup 11/13: Haptoglobin 132, retic 3.9   - Continue to monitor.           Severe malnutrition    - Dietary consulted.   - Temporal and distal extremity muscle wasting and edema.  - Encourage supplements  and to increase nutritional intake.  - Per nephrology, pt to be on low protein diet.  - Prealbumin reviewed.   - Tolerating diet.  Denies n/v.    -Told pt it's ok to resume outside protein shakes as renal fx stable, but will monitor closely.  - Attempted low fat, high protein diet due to suspicion for chyle leak with no improvement seen in fluid character.  - Will place PICC line and start TPN tonight 12/3.         Type 2 diabetes mellitus with diabetic polyneuropathy, with long-term current use of insulin    - Continue home regimen.  - Endocrine consulted.  - Endocrine plans to start insulin drip tonight w/ TPN and monitor closely. Appreciate recs.       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  - Tx w/ Entecavir- dose changed to daily 11/26 due to improved renal fx  - Hep B surface antigen, surface AB quant, and Hep B DNA 12/3 pending             VTE Risk Mitigation (From admission, onward)        Ordered     heparin (porcine) injection 5,000 Units  Every 8 hours      11/22/18 1014     IP VTE HIGH RISK PATIENT  Once      11/05/18 1145     Place sequential compression device  Until discontinued      10/05/18 0709          The patients clinical status was discussed at multidisplinary rounds, involving transplant surgery, transplant medicine, pharmacy, nursing, nutrition, and social work    Discharge Planning:  No Patient Care Coordination Note on file.      Elizabeth Dean PA-C  Liver Transplant  [facility]

## 2018-12-03 NOTE — ASSESSMENT & PLAN NOTE
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   - POD 1 US: increased arterial resistive indices, suggestive of edema vs acute rejection vs congestion.  - Repeat US 10/9 with continued elevation of RIs.  - LFTs stable.  - T bili trending down.   - Repeat liver US (10/11): elevated RIs.  - Liver US 11/6 to assess for ascites. No arterial flow within the liver allograft concerning for interval thrombosis/occlusion of the arterial system. Severe ascites seen.  - Liver US 11/8: arterial flow seen w/ elevated RIs   - LFTs remain stable. No CTA at this time due to EVA and normal liver function.   - Liver US 11/15 showed "abnormally decreased main hepatic artery peak systolic velocity with slow arterial flow and abnormal tardus parvus waveforms intrahepatically."   -Vascular surgery consulted  - Liver US 11/19 showed no arterial flow on the right and sluggish flow on the left.   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 No further intervention planned at this time as patient's LFTs are stable. Will monitor.  - CT abd/pelvis 11/26 showed: 1. Persistent large volume of ascites. 2. Large right and moderate left pleural effusions with adjacent basilar atelectasis. 3. Anasarca  - IR placed perc drain 11/29 due to suspicion for chyle leak. 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  - Fluid also growing enterococcus and will plan for treatment with Vanc per ID recs.     "

## 2018-12-04 PROBLEM — E68 SEQUELAE OF HYPERALIMENTATION: Status: ACTIVE | Noted: 2018-12-04

## 2018-12-04 LAB
ABO + RH BLD: NORMAL
ALBUMIN SERPL BCP-MCNC: 3 G/DL
ALP SERPL-CCNC: 94 U/L
ALT SERPL W/O P-5'-P-CCNC: 13 U/L
ANION GAP SERPL CALC-SCNC: 11 MMOL/L
AST SERPL-CCNC: 18 U/L
BASOPHILS # BLD AUTO: 0.01 K/UL
BASOPHILS NFR BLD: 0.5 %
BILIRUB SERPL-MCNC: 1.3 MG/DL
BLD GP AB SCN CELLS X3 SERPL QL: NORMAL
BLD PROD TYP BPU: NORMAL
BLD PROD TYP BPU: NORMAL
BLOOD GROUP ANTIBODIES SERPL: NORMAL
BLOOD UNIT EXPIRATION DATE: NORMAL
BLOOD UNIT EXPIRATION DATE: NORMAL
BLOOD UNIT TYPE CODE: 6200
BLOOD UNIT TYPE CODE: 6200
BLOOD UNIT TYPE: NORMAL
BLOOD UNIT TYPE: NORMAL
BUN SERPL-MCNC: 52 MG/DL
CALCIUM SERPL-MCNC: 8.3 MG/DL
CHLORIDE SERPL-SCNC: 96 MMOL/L
CO2 SERPL-SCNC: 32 MMOL/L
CODING SYSTEM: NORMAL
CODING SYSTEM: NORMAL
CREAT SERPL-MCNC: 1 MG/DL
DIFFERENTIAL METHOD: ABNORMAL
DISPENSE STATUS: NORMAL
DISPENSE STATUS: NORMAL
EOSINOPHIL # BLD AUTO: 0 K/UL
EOSINOPHIL NFR BLD: 1.9 %
ERYTHROCYTE [DISTWIDTH] IN BLOOD BY AUTOMATED COUNT: 16.2 %
EST. GFR  (AFRICAN AMERICAN): >60 ML/MIN/1.73 M^2
EST. GFR  (NON AFRICAN AMERICAN): 57.6 ML/MIN/1.73 M^2
GLUCOSE SERPL-MCNC: 237 MG/DL
HBV SURFACE AG SERPL QL IA: NEGATIVE
HCT VFR BLD AUTO: 21.5 %
HCT VFR BLD AUTO: 22.4 %
HCT VFR BLD AUTO: 27.1 %
HGB BLD-MCNC: 6.8 G/DL
HGB BLD-MCNC: 6.8 G/DL
HGB BLD-MCNC: 8.4 G/DL
IMM GRANULOCYTES # BLD AUTO: 0.07 K/UL
IMM GRANULOCYTES NFR BLD AUTO: 3.3 %
LYMPHOCYTES # BLD AUTO: 0.3 K/UL
LYMPHOCYTES NFR BLD: 12.9 %
MAGNESIUM SERPL-MCNC: 1.6 MG/DL
MCH RBC QN AUTO: 28.7 PG
MCHC RBC AUTO-ENTMCNC: 31.6 G/DL
MCV RBC AUTO: 91 FL
MONOCYTES # BLD AUTO: 0.3 K/UL
MONOCYTES NFR BLD: 15.7 %
NEUTROPHILS # BLD AUTO: 1.4 K/UL
NEUTROPHILS NFR BLD: 65.7 %
NRBC BLD-RTO: 0 /100 WBC
PHOSPHATE SERPL-MCNC: 3.7 MG/DL
PLATELET # BLD AUTO: 101 K/UL
PMV BLD AUTO: 10.3 FL
POCT GLUCOSE: 120 MG/DL (ref 70–110)
POCT GLUCOSE: 138 MG/DL (ref 70–110)
POCT GLUCOSE: 140 MG/DL (ref 70–110)
POCT GLUCOSE: 145 MG/DL (ref 70–110)
POCT GLUCOSE: 152 MG/DL (ref 70–110)
POCT GLUCOSE: 155 MG/DL (ref 70–110)
POCT GLUCOSE: 182 MG/DL (ref 70–110)
POCT GLUCOSE: 201 MG/DL (ref 70–110)
POCT GLUCOSE: 211 MG/DL (ref 70–110)
POCT GLUCOSE: 218 MG/DL (ref 70–110)
POCT GLUCOSE: 226 MG/DL (ref 70–110)
POCT GLUCOSE: 240 MG/DL (ref 70–110)
POCT GLUCOSE: 253 MG/DL (ref 70–110)
POCT GLUCOSE: 258 MG/DL (ref 70–110)
POCT GLUCOSE: 273 MG/DL (ref 70–110)
POCT GLUCOSE: 285 MG/DL (ref 70–110)
POCT GLUCOSE: 286 MG/DL (ref 70–110)
POCT GLUCOSE: 288 MG/DL (ref 70–110)
POCT GLUCOSE: 307 MG/DL (ref 70–110)
POTASSIUM SERPL-SCNC: 3.2 MMOL/L
PROT SERPL-MCNC: 4.8 G/DL
RBC # BLD AUTO: 2.37 M/UL
SODIUM SERPL-SCNC: 139 MMOL/L
TACROLIMUS BLD-MCNC: 4.2 NG/ML
TRANS ERYTHROCYTES VOL PATIENT: NORMAL ML
TRANS ERYTHROCYTES VOL PATIENT: NORMAL ML
WBC # BLD AUTO: 2.1 K/UL

## 2018-12-04 PROCEDURE — 36430 TRANSFUSION BLD/BLD COMPNT: CPT

## 2018-12-04 PROCEDURE — A4216 STERILE WATER/SALINE, 10 ML: HCPCS | Performed by: TRANSPLANT SURGERY

## 2018-12-04 PROCEDURE — 25000003 PHARM REV CODE 250: Performed by: NURSE PRACTITIONER

## 2018-12-04 PROCEDURE — 80053 COMPREHEN METABOLIC PANEL: CPT

## 2018-12-04 PROCEDURE — 87517 HEPATITIS B DNA QUANT: CPT

## 2018-12-04 PROCEDURE — 85014 HEMATOCRIT: CPT

## 2018-12-04 PROCEDURE — 63600175 PHARM REV CODE 636 W HCPCS: Performed by: NURSE PRACTITIONER

## 2018-12-04 PROCEDURE — P9021 RED BLOOD CELLS UNIT: HCPCS

## 2018-12-04 PROCEDURE — 80197 ASSAY OF TACROLIMUS: CPT

## 2018-12-04 PROCEDURE — 85025 COMPLETE CBC W/AUTO DIFF WBC: CPT

## 2018-12-04 PROCEDURE — 99233 SBSQ HOSP IP/OBS HIGH 50: CPT | Mod: 24,,, | Performed by: PHYSICIAN ASSISTANT

## 2018-12-04 PROCEDURE — 25000003 PHARM REV CODE 250: Performed by: TRANSPLANT SURGERY

## 2018-12-04 PROCEDURE — 87340 HEPATITIS B SURFACE AG IA: CPT

## 2018-12-04 PROCEDURE — 85014 HEMATOCRIT: CPT | Mod: 91

## 2018-12-04 PROCEDURE — 99233 SBSQ HOSP IP/OBS HIGH 50: CPT | Mod: ,,, | Performed by: NURSE PRACTITIONER

## 2018-12-04 PROCEDURE — 86706 HEP B SURFACE ANTIBODY: CPT

## 2018-12-04 PROCEDURE — 86870 RBC ANTIBODY IDENTIFICATION: CPT

## 2018-12-04 PROCEDURE — 85018 HEMOGLOBIN: CPT

## 2018-12-04 PROCEDURE — 83735 ASSAY OF MAGNESIUM: CPT

## 2018-12-04 PROCEDURE — A4217 STERILE WATER/SALINE, 500 ML: HCPCS | Performed by: PHYSICIAN ASSISTANT

## 2018-12-04 PROCEDURE — 20600001 HC STEP DOWN PRIVATE ROOM

## 2018-12-04 PROCEDURE — 25000003 PHARM REV CODE 250: Performed by: PHYSICIAN ASSISTANT

## 2018-12-04 PROCEDURE — 85018 HEMOGLOBIN: CPT | Mod: 91

## 2018-12-04 PROCEDURE — 86922 COMPATIBILITY TEST ANTIGLOB: CPT

## 2018-12-04 PROCEDURE — 63600175 PHARM REV CODE 636 W HCPCS: Performed by: INTERNAL MEDICINE

## 2018-12-04 PROCEDURE — 63600175 PHARM REV CODE 636 W HCPCS: Performed by: PHYSICIAN ASSISTANT

## 2018-12-04 PROCEDURE — 25000003 PHARM REV CODE 250: Performed by: INTERNAL MEDICINE

## 2018-12-04 PROCEDURE — 84100 ASSAY OF PHOSPHORUS: CPT

## 2018-12-04 PROCEDURE — 86901 BLOOD TYPING SEROLOGIC RH(D): CPT

## 2018-12-04 RX ORDER — HYDROCODONE BITARTRATE AND ACETAMINOPHEN 500; 5 MG/1; MG/1
TABLET ORAL
Status: DISCONTINUED | OUTPATIENT
Start: 2018-12-04 | End: 2018-12-13

## 2018-12-04 RX ORDER — POTASSIUM CHLORIDE 20 MEQ/1
40 TABLET, EXTENDED RELEASE ORAL ONCE
Status: DISCONTINUED | OUTPATIENT
Start: 2018-12-04 | End: 2018-12-04

## 2018-12-04 RX ADMIN — VANCOMYCIN HYDROCHLORIDE 1000 MG: 1 INJECTION, POWDER, LYOPHILIZED, FOR SOLUTION INTRAVENOUS at 03:12

## 2018-12-04 RX ADMIN — MAGNESIUM OXIDE TAB 400 MG (241.3 MG ELEMENTAL MG) 800 MG: 400 (241.3 MG) TAB at 08:12

## 2018-12-04 RX ADMIN — ASPIRIN 81 MG CHEWABLE TABLET 81 MG: 81 TABLET CHEWABLE at 08:12

## 2018-12-04 RX ADMIN — FUROSEMIDE 40 MG: 40 TABLET ORAL at 05:12

## 2018-12-04 RX ADMIN — Medication 10 ML: at 05:12

## 2018-12-04 RX ADMIN — DIBASIC SODIUM PHOSPHATE, MONOBASIC POTASSIUM PHOSPHATE AND MONOBASIC SODIUM PHOSPHATE 2 TABLET: 852; 155; 130 TABLET ORAL at 08:12

## 2018-12-04 RX ADMIN — PANTOPRAZOLE SODIUM 40 MG: 40 TABLET, DELAYED RELEASE ORAL at 08:12

## 2018-12-04 RX ADMIN — SODIUM CHLORIDE 2.5 UNITS/HR: 9 INJECTION, SOLUTION INTRAVENOUS at 10:12

## 2018-12-04 RX ADMIN — OCTREOTIDE ACETATE 50 MCG: 100 INJECTION, SOLUTION INTRAVENOUS; SUBCUTANEOUS at 05:12

## 2018-12-04 RX ADMIN — ATOVAQUONE 1500 MG: 750 SUSPENSION ORAL at 08:12

## 2018-12-04 RX ADMIN — OCTREOTIDE ACETATE 50 MCG: 100 INJECTION, SOLUTION INTRAVENOUS; SUBCUTANEOUS at 02:12

## 2018-12-04 RX ADMIN — TACROLIMUS 2 MG: 1 CAPSULE ORAL at 05:12

## 2018-12-04 RX ADMIN — POTASSIUM CHLORIDE: 2 INJECTION, SOLUTION, CONCENTRATE INTRAVENOUS at 08:12

## 2018-12-04 RX ADMIN — METOPROLOL TARTRATE 25 MG: 25 TABLET ORAL at 08:12

## 2018-12-04 RX ADMIN — TACROLIMUS 2 MG: 1 CAPSULE ORAL at 08:12

## 2018-12-04 RX ADMIN — HEPARIN SODIUM 5000 UNITS: 5000 INJECTION, SOLUTION INTRAVENOUS; SUBCUTANEOUS at 08:12

## 2018-12-04 RX ADMIN — ENTECAVIR 0.5 MG: 0.5 TABLET ORAL at 08:12

## 2018-12-04 RX ADMIN — POTASSIUM BICARBONATE 50 MEQ: 25 TABLET, EFFERVESCENT ORAL at 02:12

## 2018-12-04 RX ADMIN — FUROSEMIDE 40 MG: 40 TABLET ORAL at 08:12

## 2018-12-04 RX ADMIN — Medication 10 ML: at 12:12

## 2018-12-04 RX ADMIN — OCTREOTIDE ACETATE 50 MCG: 100 INJECTION, SOLUTION INTRAVENOUS; SUBCUTANEOUS at 08:12

## 2018-12-04 RX ADMIN — PREDNISONE 2.5 MG: 2.5 TABLET ORAL at 08:12

## 2018-12-04 NOTE — ASSESSMENT & PLAN NOTE
Managed per primary team  Avoid hypoglycemia    Recent Labs   Lab 12/04/18  0430   ALT 13   AST 18   ALKPHOS 94   BILITOT 1.3*   PROT 4.8*   ALBUMIN 3.0*

## 2018-12-04 NOTE — PT/OT/SLP PROGRESS
Physical Therapy      Patient Name:  Scarlett Reddy   MRN:  [id number]    Patient not seen today secondary to (Pt politely declined therapy this date 2* weakness and fatigue from NPO status and lack of sleep overnight.) Pt instructed to have staff assist for all standing tasks and transfers 2* weakness.  at bedside translating. Pt and  v/u. Will follow-up at next scheduled session as able.    Radha Nguyen, PT, DPT   12/4/2018  [phone]

## 2018-12-04 NOTE — PROGRESS NOTES
Follow up visit for wound care and NPWT dressing change.  Wounds are smaller with white versafoam in use to the wound bed and bridged together with black granufoam. Elizabeth Dean PA-C at bedside for wound visualization. Informed them we will discontinue use of the wound vac when depth is < 0.5cm. Swazi  at bedside communicating wound plan of care.      12/04/18 1300       Wound 11/27/18 1300 Other (comment) transverse Abdomen   Date First Assessed/Time First Assessed: 11/27/18 1300   Pre-existing: No  Primary Wound Type: (c) Other (comment)  Side: Right  Orientation: transverse  Location: Abdomen   Wound Image      Wound WDL ex   Dressing Appearance Clean;Intact   Drainage Amount Small   Drainage Characteristics/Odor Creamy  (white)   Appearance Pink;Moist   Tissue loss description Full thickness   Red (%), Wound Tissue Color 100 %  (pink)   Periwound Area Intact   Wound Edges Open   Wound Length (cm) 0.9 cm  (0.2)   Wound Width (cm) 3 cm  (1.2)   Wound Depth (cm) 0.6 cm  (0.7)   Wound Volume (cm^3) 1.62 cm^3   Wound Surface Area (cm^2) 2.7 cm^2   Tunneling (depth (cm)/location) 0.7medially  (1cm laterally and 0.5cm medially)   Care Cleansed with:;Sterile normal saline   Dressing Applied  (NPWT dressin white and bridged witH Black)       Negative Pressure Wound Therapy    Placement Date/Time: 11/24/18 1000   Orientation: transverse  Location: Abdomen   NPWT Type Vacuum Therapy   Therapy Setting NPWT Continuous therapy   Pressure Setting NPWT 125 mmHg   Therapy Interventions NPWT Dressing changed   Sponges Inserted NPWT White;2;Black  (black to bridge wounds)     Urban Patel RN CWON  [id number]

## 2018-12-04 NOTE — SUBJECTIVE & OBJECTIVE
Scheduled Meds:   aspirin  81 mg Oral Daily    atovaquone  1,500 mg Oral Daily    entecavir  0.5 mg Oral Daily    furosemide  40 mg Oral BID    heparin (porcine)  5,000 Units Subcutaneous Q8H    k phos di & mono-sod phos mono  2 tablet Oral BID    magnesium oxide  800 mg Oral BID    metoprolol tartrate  25 mg Oral BID    octreotide  50 mcg Subcutaneous Q8H    pantoprazole  40 mg Oral BID    predniSONE  2.5 mg Oral Daily    sodium chloride 0.9%  10 mL Intravenous Q6H    tacrolimus  2 mg Oral BID    vancomycin (VANCOCIN) IVPB  1,000 mg Intravenous Q24H     Continuous Infusions:   insulin (HUMAN R) infusion (adults) 1 Units/hr (12/04/18 1600)    TPN ADULT CENTRAL LINE CUSTOM 45 mL/hr at 12/03/18 2100    TPN ADULT CENTRAL LINE CUSTOM       PRN Meds:sodium chloride, sodium chloride, sodium chloride, acetaminophen, bisacodyl, calcium carbonate, dextrose 50%, dextrose 50%, dextrose 50%, dextrose 50%, glucagon (human recombinant), glucose, glucose, omnipaque, ondansetron, ondansetron, polyethylene glycol, Flushing PICC Protocol **AND** sodium chloride 0.9% **AND** sodium chloride 0.9%, sodium chloride 0.9%, traMADol    Review of Systems   Constitutional: Positive for activity change. Negative for chills, fatigue and fever.   HENT: Negative for congestion, facial swelling and trouble swallowing.    Eyes: Negative for pain, discharge and visual disturbance.   Respiratory: Negative for cough, chest tightness, shortness of breath and wheezing.    Cardiovascular: Positive for leg swelling. Negative for chest pain and palpitations.   Gastrointestinal: Positive for abdominal distention and abdominal pain. Negative for constipation, diarrhea, nausea and vomiting.   Endocrine: Negative.    Genitourinary: Negative for decreased urine volume, difficulty urinating and dysuria.   Musculoskeletal: Positive for back pain.Negative for arthralgias and neck stiffness.   Skin: Positive for wound.   Allergic/Immunologic:  Positive for immunocompromised state.   Neurological: Positive for weakness. Negative for tremors and headaches.   Psychiatric/Behavioral: Negative for confusion and sleep disturbance.     Objective:     Vital Signs (Most Recent):  Temp: 97.8 °F (36.6 °C) (12/04/18 1445)  Pulse: 92 (12/04/18 1445)  Resp: (!) 22 (12/04/18 1445)  BP: 104/61 (12/04/18 1445)  SpO2: (!) 94 % (12/04/18 1445) Vital Signs (24h Range):  Temp:  [97.6 °F (36.4 °C)-98.7 °F (37.1 °C)] 97.8 °F (36.6 °C)  Pulse:  [87-99] 92  Resp:  [14-22] 22  SpO2:  [94 %-98 %] 94 %  BP: (104-132)/(61-82) 104/61     Weight: 58.6 kg (129 lb 3 oz)  Body mass index is 22.88 kg/m².    Intake/Output - Last 3 Shifts       12/02 0700 - 12/03 0659 12/03 0700 - 12/04 0659 12/04 0700 - 12/05 0659    P.O. 840 0     I.V. (mL/kg) 0 (0) 101.7 (1.7) 23 (0.4)    Blood   260    Other 0      IV Piggyback 250 250 250    TPN  405 405    Total Intake(mL/kg) 1090 (18.8) 756.7 (12.9) 938 (16)    Urine (mL/kg/hr) 0 (0) 0 (0) 1150 (1.8)    Emesis/NG output 0 0     Drains 670 580 150    Other 0 0     Stool 0 0     Blood 0      Total Output     Net +420 +176.7 -362           Urine Occurrence 6 x 3 x 2 x    Stool Occurrence 3 x 1 x     Emesis Occurrence 0 x 0 x           Physical Exam   Constitutional: She is oriented to person, place, and time. She appears well-developed. No distress.   Temporal and distal extremity muscle wasting   HENT:   Head: Normocephalic and atraumatic.   Eyes: EOM are normal. No scleral icterus.   Neck: Normal range of motion.   Cardiovascular: Normal rate, regular rhythm and normal heart sounds.   No murmur heard.  Pulmonary/Chest: Effort normal. No respiratory distress. She has no wheezes.   Breath sounds decreased at bases   Abdominal: Soft. Bowel sounds are normal. She exhibits distension. There is tenderness. There is no guarding.   Wound vac placed to middle chevron incision + R lateral side, no s/s/i  Erythema + induration RLQ, improving  RLQ VALORIE  drain sites c/d/i with sutures   Musculoskeletal: Normal range of motion. She exhibits edema (2+ BLE, + generalized).   Neurological: She is alert and oriented to person, place, and time.   Skin: Skin is warm. She is not diaphoretic.   Psychiatric: She has a normal mood and affect.   Nursing note and vitals reviewed.      Laboratory:  Immunosuppressants         Stop Route Frequency     tacrolimus capsule 2 mg      -- Oral 2 times daily        CBC:   Recent Labs   Lab 12/04/18  0430  12/04/18  1531   WBC 2.10*  --   --    RBC 2.37*  --   --    HGB 6.8*   < > 8.4*   HCT 21.5*   < > 27.1*   *  --   --    MCV 91  --   --    MCH 28.7  --   --    MCHC 31.6*  --   --     < > = values in this interval not displayed.     CMP:   Recent Labs   Lab 12/04/18  0430   *   CALCIUM 8.3*   ALBUMIN 3.0*   PROT 4.8*      K 3.2*   CO2 32*   CL 96   BUN 52*   CREATININE 1.0   ALKPHOS 94   ALT 13   AST 18   BILITOT 1.3*     Labs within the past 24 hours have been reviewed.    Diagnostic Results:  I have personally reviewed all pertinent imaging studies.Scheduled Meds:   aspirin  81 mg Oral Daily    atovaquone  1,500 mg Oral Daily    entecavir  0.5 mg Oral Daily    furosemide  40 mg Oral BID    heparin (porcine)  5,000 Units Subcutaneous Q8H    k phos di & mono-sod phos mono  2 tablet Oral BID    magnesium oxide  800 mg Oral BID    metoprolol tartrate  25 mg Oral BID    octreotide  50 mcg Subcutaneous Q8H    pantoprazole  40 mg Oral BID    predniSONE  2.5 mg Oral Daily    sodium chloride 0.9%  10 mL Intravenous Q6H    tacrolimus  2 mg Oral BID    vancomycin (VANCOCIN) IVPB  1,000 mg Intravenous Q24H     Continuous Infusions:   insulin (HUMAN R) infusion (adults) 1 Units/hr (12/04/18 1600)    TPN ADULT CENTRAL LINE CUSTOM 45 mL/hr at 12/03/18 2100    TPN ADULT CENTRAL LINE CUSTOM       PRN Meds:sodium chloride, sodium chloride, sodium chloride, acetaminophen, bisacodyl, calcium carbonate, dextrose 50%,  dextrose 50%, dextrose 50%, dextrose 50%, glucagon (human recombinant), glucose, glucose, omnipaque, ondansetron, ondansetron, polyethylene glycol, Flushing PICC Protocol **AND** sodium chloride 0.9% **AND** sodium chloride 0.9%, sodium chloride 0.9%, traMADol    Review of Systems  Objective:     Vital Signs (Most Recent):  Temp: 97.8 °F (36.6 °C) (12/04/18 1445)  Pulse: 92 (12/04/18 1445)  Resp: (!) 22 (12/04/18 1445)  BP: 104/61 (12/04/18 1445)  SpO2: (!) 94 % (12/04/18 1445) Vital Signs (24h Range):  Temp:  [97.6 °F (36.4 °C)-98.7 °F (37.1 °C)] 97.8 °F (36.6 °C)  Pulse:  [87-99] 92  Resp:  [14-22] 22  SpO2:  [94 %-98 %] 94 %  BP: (104-132)/(61-82) 104/61     Weight: 58.6 kg (129 lb 3 oz)  Body mass index is 22.88 kg/m².    Intake/Output - Last 3 Shifts       12/02 0700 - 12/03 0659 12/03 0700 - 12/04 0659 12/04 0700 - 12/05 0659    P.O. 840 0     I.V. (mL/kg) 0 (0) 101.7 (1.7) 23 (0.4)    Blood   260    Other 0      IV Piggyback 250 250 250    TPN  405 405    Total Intake(mL/kg) 1090 (18.8) 756.7 (12.9) 938 (16)    Urine (mL/kg/hr) 0 (0) 0 (0) 1150 (1.8)    Emesis/NG output 0 0     Drains 670 580 150    Other 0 0     Stool 0 0     Blood 0      Total Output     Net +420 +176.7 -362           Urine Occurrence 6 x 3 x 2 x    Stool Occurrence 3 x 1 x     Emesis Occurrence 0 x 0 x           Physical Exam      Laboratory:  Immunosuppressants         Stop Route Frequency     tacrolimus capsule 2 mg      -- Oral 2 times daily        CBC:   Recent Labs   Lab 12/04/18  0430  12/04/18  1531   WBC 2.10*  --   --    RBC 2.37*  --   --    HGB 6.8*   < > 8.4*   HCT 21.5*   < > 27.1*   *  --   --    MCV 91  --   --    MCH 28.7  --   --    MCHC 31.6*  --   --     < > = values in this interval not displayed.     CMP:   Recent Labs   Lab 12/04/18  0430   *   CALCIUM 8.3*   ALBUMIN 3.0*   PROT 4.8*      K 3.2*   CO2 32*   CL 96   BUN 52*   CREATININE 1.0   ALKPHOS 94   ALT 13   AST 18   BILITOT 1.3*      Coagulation: No results for input(s): PT, INR, APTT in the last 168 hours.  Labs within the past 24 hours have been reviewed.    Diagnostic Results:  I have personally reviewed all pertinent imaging studies.

## 2018-12-04 NOTE — PROGRESS NOTES
Spoke with GREGORIO Gonzalez re: patient's H/H 8.4/27.1. Instructed to transfuse second unit PRBC. Will implement upon patient's return from US.

## 2018-12-04 NOTE — PROGRESS NOTES
[facility]  Liver Transplant  Progress Note    Patient Name: Scarlett Reddy  MRN: [id number]  Admission Date: 10/1/2018  Hospital Length of Stay: 63 days  Code Status: Full Code  Primary Care Provider: Primary Doctor No  Post-Operative Day: 60    ORGAN:   LIVER  Disease Etiology: Cirrhosis: Type B and D  Donor Type:    - Brain Death  CDC High Risk:   No  Donor CMV Status:   Donor CMV Status: Positive  Donor HBcAB:   Negative  Donor HCV Status:   Negative  Donor HBV SETH: Negative  Donor HCV SETH: Negative  Whole or Partial: Whole Liver  Biliary Anastomosis: End to End  Arterial Anatomy: Replaced Left Hepatic and Right Hepatic  Subjective:     History of Present Illness:  Ms. Reddy is a 67 y/o female with PMH of ESLD secondary Hep B and D.  Listed for liver transplant with MELD 23.  Paracentesis 10/1 with 5L removed, no fluid sent for analysis.  Morning labs significant for hyponatremia, Na 119.  Pt admitted to LTS for sodium monitoring.      Hospital Course: 67 y/o F h/o HBV/delta cirrhosis s/p DBDLT 10/5/18 (CMV D+/R+, steroid induction, MMF/tacro/pred maintenance) with take back on 10/6 2/2 hyperbilirubinema and bilious VALORIE drainage, no leak identified. Post-op course c/b hypercapneic and hypoxic respiratory failure and was reintubated though quickly extubated now doing well. Liver bx (10/6) sig for Zone 3 hemorrhage and collapse, in addition to cholestasis. Transferred from ICU on POD #4. ID consulted to comment on positive diphtheroid culture from ascitic fluid (likely skin colonization) as well as ESBL Klebsiella pneumoniae in urine culture (10/4).  Pt asymptomatic and cell count from ascitic fluid unremarkable. Per ID recs, no need to treat diphtheroids or asymptomatic ESBL Kleb pneumo bacteriuria though pt has had 6 days of therapy which would cover these organisms. Mild peritransplant ascites- repeat US 10/6 with increased arterial resistive indices. Repeat US 10/9 w/ continued  elevation in RIs and fluid collections. LFTs trending down nicely.     Acute gradual worsening of renal function s/p OLTx. Creatinine on arrival 0.8 and has been up trending. Nephrology consulted for post-op EVA. Cr cont to trend up but remains to have good UOP. Had stable response to lasiz diuresis.  BUN elevated requiring multiple rounds of dialysis (10/15, 10/17, 10/20, 10/31). No significant improvements in kidney function noted.   In addition, pt H/H cont to fluctuate requiring multiple blood transfusions (10/14, 10/16, 10/18, 10/19, 10/22, 10/27, 10/29).  Pt reported dark stools 10/15 but color has normalized. FOBT+. EGD 10/18 consistent with ulcerated mucosa in duodenum s/p coagulation. Concern for H. Pylori. Started Amoxicillin/levofloxacin (10/19, complete 11/2)). Remains on Protonix 40 mg BID. Biopsy negative for infection and CMV. Will cont close monitoring H/H, transfuse as needed with goal Hb > 7.0. Of note, Urine cx + Klebsiella pneumoniae (10/13). Received 3 day course of ciprofloxacin, dc'd 10/19.   On 10/19 pm pt c/o crushing chest pain. Troponin 0.094, likely 2/2 ischemic stress. EKG NSR.   Liver US 11/6 showed no arterial flow. Paracentesis 11/7 with 8.9 L removed. Wbc 39, segs 16%. Gram stain no organisms seen + no WBC. Pt feels better since having fluid removed but she continues to have worsening abdominal distention & LE edema.Repeat Liver US 11/8 showed arterial flow with increased RIs. Paracentesis 11/12 with 8.6L removed (WBC 34, segs 25%). 11/15 liver u/s showed abnormally decreased main hepatic artery peak systolic velocity with slow arterial flow and abnormal tardus parvus waveforms intrahepatically. Consulted vascular surgery. Repeat liver US 11/19 showed no arterial flow on R and sluggish flow on L. Hepatic angiogram 11/19 confirmed HAT.  Friday 11/16, purulent drainage noted from middle of chevron incision. Area opened and packed. Cx grew klebsiella p. CT scan showed no signs of  infection. On Monday 11/18, it was noted that the right/lateral side of the chevron had 3 small openings draining purulent fluid. Opened the area and packed. Vac placed on 11/24. Wound care following.     Interval history: PICC line placed and TPN started last night. Pt w/ episodes of hypoglycemia overnight. Improved this AM. Pt reports fatigue today, but otherwise feels well. Drainage still chylous today, but expect improvement with time/TPN. CT abd/pelvis 11/3 showed small volume abdominal ascites, improved compared to prior, pelvis ascites unchanged from prior, unchanged b/l pleural effusions (large on R, moderate on L) with bibasilar compressive atelectasis and consolidative changes. H/H dropped today from 8.5/26.3 to 6.8/22.4. Transfused 2u PRBC. Continue Lasix 40 mg BID. Patient finished day 7 of 7 of Ertapenem for kleb pneumo ESBL UTI 11/29. Of note, pt grew same bacteria from wound 11/16. Peritoneal fluid 11/29 w/ enterococcus. Per ID will treat with Vanc x 10 days and then repeat peritoneal fluid cx. Wound vac changed today, w/ improvements noted in wound.  Liver US to assess recanalization post HAT - pending. Monitor.     Scheduled Meds:   aspirin  81 mg Oral Daily    atovaquone  1,500 mg Oral Daily    entecavir  0.5 mg Oral Daily    furosemide  40 mg Oral BID    heparin (porcine)  5,000 Units Subcutaneous Q8H    k phos di & mono-sod phos mono  2 tablet Oral BID    magnesium oxide  800 mg Oral BID    metoprolol tartrate  25 mg Oral BID    octreotide  50 mcg Subcutaneous Q8H    pantoprazole  40 mg Oral BID    predniSONE  2.5 mg Oral Daily    sodium chloride 0.9%  10 mL Intravenous Q6H    tacrolimus  2 mg Oral BID    vancomycin (VANCOCIN) IVPB  1,000 mg Intravenous Q24H     Continuous Infusions:   insulin (HUMAN R) infusion (adults) 1 Units/hr (12/04/18 1600)    TPN ADULT CENTRAL LINE CUSTOM 45 mL/hr at 12/03/18 2100    TPN ADULT CENTRAL LINE CUSTOM       PRN Meds:sodium chloride, sodium  chloride, sodium chloride, acetaminophen, bisacodyl, calcium carbonate, dextrose 50%, dextrose 50%, dextrose 50%, dextrose 50%, glucagon (human recombinant), glucose, glucose, omnipaque, ondansetron, ondansetron, polyethylene glycol, Flushing PICC Protocol **AND** sodium chloride 0.9% **AND** sodium chloride 0.9%, sodium chloride 0.9%, traMADol    Review of Systems   Constitutional: Positive for activity change. Negative for chills, fatigue and fever.   HENT: Negative for congestion, facial swelling and trouble swallowing.    Eyes: Negative for pain, discharge and visual disturbance.   Respiratory: Negative for cough, chest tightness, shortness of breath and wheezing.    Cardiovascular: Positive for leg swelling. Negative for chest pain and palpitations.   Gastrointestinal: Positive for abdominal distention and abdominal pain. Negative for constipation, diarrhea, nausea and vomiting.   Endocrine: Negative.    Genitourinary: Negative for decreased urine volume, difficulty urinating and dysuria.   Musculoskeletal: Positive for back pain.Negative for arthralgias and neck stiffness.   Skin: Positive for wound.   Allergic/Immunologic: Positive for immunocompromised state.   Neurological: Positive for weakness. Negative for tremors and headaches.   Psychiatric/Behavioral: Negative for confusion and sleep disturbance.     Objective:     Vital Signs (Most Recent):  Temp: 97.8 °F (36.6 °C) (12/04/18 1445)  Pulse: 92 (12/04/18 1445)  Resp: (!) 22 (12/04/18 1445)  BP: 104/61 (12/04/18 1445)  SpO2: (!) 94 % (12/04/18 1445) Vital Signs (24h Range):  Temp:  [97.6 °F (36.4 °C)-98.7 °F (37.1 °C)] 97.8 °F (36.6 °C)  Pulse:  [87-99] 92  Resp:  [14-22] 22  SpO2:  [94 %-98 %] 94 %  BP: (104-132)/(61-82) 104/61     Weight: 58.6 kg (129 lb 3 oz)  Body mass index is 22.88 kg/m².    Intake/Output - Last 3 Shifts       12/02 0700 - 12/03 0659 12/03 0700 - 12/04 0659 12/04 0700 - 12/05 0659    P.O. 840 0     I.V. (mL/kg) 0 (0) 101.7 (1.7) 23  (0.4)    Blood   260    Other 0      IV Piggyback 250 250 250    TPN  405 405    Total Intake(mL/kg) 1090 (18.8) 756.7 (12.9) 938 (16)    Urine (mL/kg/hr) 0 (0) 0 (0) 1150 (1.8)    Emesis/NG output 0 0     Drains 670 580 150    Other 0 0     Stool 0 0     Blood 0      Total Output     Net +420 +176.7 -362           Urine Occurrence 6 x 3 x 2 x    Stool Occurrence 3 x 1 x     Emesis Occurrence 0 x 0 x           Physical Exam   Constitutional: She is oriented to person, place, and time. She appears well-developed. No distress.   Temporal and distal extremity muscle wasting   HENT:   Head: Normocephalic and atraumatic.   Eyes: EOM are normal. No scleral icterus.   Neck: Normal range of motion.   Cardiovascular: Normal rate, regular rhythm and normal heart sounds.   No murmur heard.  Pulmonary/Chest: Effort normal. No respiratory distress. She has no wheezes.   Breath sounds decreased at bases   Abdominal: Soft. Bowel sounds are normal. She exhibits distension. There is tenderness. There is no guarding.   Wound vac placed to middle chevron incision + R lateral side, no s/s/i  Erythema + induration RLQ, improving  RLQ VALORIE drain sites c/d/i with sutures   Musculoskeletal: Normal range of motion. She exhibits edema (2+ BLE, + generalized).   Neurological: She is alert and oriented to person, place, and time.   Skin: Skin is warm. She is not diaphoretic.   Psychiatric: She has a normal mood and affect.   Nursing note and vitals reviewed.      Laboratory:  Immunosuppressants         Stop Route Frequency     tacrolimus capsule 2 mg      -- Oral 2 times daily        CBC:   Recent Labs   Lab 12/04/18  0430  12/04/18  1531   WBC 2.10*  --   --    RBC 2.37*  --   --    HGB 6.8*   < > 8.4*   HCT 21.5*   < > 27.1*   *  --   --    MCV 91  --   --    MCH 28.7  --   --    MCHC 31.6*  --   --     < > = values in this interval not displayed.     CMP:   Recent Labs   Lab 12/04/18  0430   *   CALCIUM 8.3*    ALBUMIN 3.0*   PROT 4.8*      K 3.2*   CO2 32*   CL 96   BUN 52*   CREATININE 1.0   ALKPHOS 94   ALT 13   AST 18   BILITOT 1.3*     Labs within the past 24 hours have been reviewed.    Diagnostic Results:  I have personally reviewed all pertinent imaging studies.Scheduled Meds:   aspirin  81 mg Oral Daily    atovaquone  1,500 mg Oral Daily    entecavir  0.5 mg Oral Daily    furosemide  40 mg Oral BID    heparin (porcine)  5,000 Units Subcutaneous Q8H    k phos di & mono-sod phos mono  2 tablet Oral BID    magnesium oxide  800 mg Oral BID    metoprolol tartrate  25 mg Oral BID    octreotide  50 mcg Subcutaneous Q8H    pantoprazole  40 mg Oral BID    predniSONE  2.5 mg Oral Daily    sodium chloride 0.9%  10 mL Intravenous Q6H    tacrolimus  2 mg Oral BID    vancomycin (VANCOCIN) IVPB  1,000 mg Intravenous Q24H     Continuous Infusions:   insulin (HUMAN R) infusion (adults) 1 Units/hr (12/04/18 1600)    TPN ADULT CENTRAL LINE CUSTOM 45 mL/hr at 12/03/18 2100    TPN ADULT CENTRAL LINE CUSTOM       PRN Meds:sodium chloride, sodium chloride, sodium chloride, acetaminophen, bisacodyl, calcium carbonate, dextrose 50%, dextrose 50%, dextrose 50%, dextrose 50%, glucagon (human recombinant), glucose, glucose, omnipaque, ondansetron, ondansetron, polyethylene glycol, Flushing PICC Protocol **AND** sodium chloride 0.9% **AND** sodium chloride 0.9%, sodium chloride 0.9%, traMADol    Review of Systems  Objective:     Vital Signs (Most Recent):  Temp: 97.8 °F (36.6 °C) (12/04/18 1445)  Pulse: 92 (12/04/18 1445)  Resp: (!) 22 (12/04/18 1445)  BP: 104/61 (12/04/18 1445)  SpO2: (!) 94 % (12/04/18 1445) Vital Signs (24h Range):  Temp:  [97.6 °F (36.4 °C)-98.7 °F (37.1 °C)] 97.8 °F (36.6 °C)  Pulse:  [87-99] 92  Resp:  [14-22] 22  SpO2:  [94 %-98 %] 94 %  BP: (104-132)/(61-82) 104/61     Weight: 58.6 kg (129 lb 3 oz)  Body mass index is 22.88 kg/m².    Intake/Output - Last 3 Shifts       12/02 0700 - 12/03 0659  12/03 0700 - 12/04 0659 12/04 0700 - 12/05 0659    P.O. 840 0     I.V. (mL/kg) 0 (0) 101.7 (1.7) 23 (0.4)    Blood   260    Other 0      IV Piggyback 250 250 250    TPN  405 405    Total Intake(mL/kg) 1090 (18.8) 756.7 (12.9) 938 (16)    Urine (mL/kg/hr) 0 (0) 0 (0) 1150 (1.8)    Emesis/NG output 0 0     Drains 670 580 150    Other 0 0     Stool 0 0     Blood 0      Total Output     Net +420 +176.7 -362           Urine Occurrence 6 x 3 x 2 x    Stool Occurrence 3 x 1 x     Emesis Occurrence 0 x 0 x           Physical Exam      Laboratory:  Immunosuppressants         Stop Route Frequency     tacrolimus capsule 2 mg      -- Oral 2 times daily        CBC:   Recent Labs   Lab 12/04/18  0430  12/04/18  1531   WBC 2.10*  --   --    RBC 2.37*  --   --    HGB 6.8*   < > 8.4*   HCT 21.5*   < > 27.1*   *  --   --    MCV 91  --   --    MCH 28.7  --   --    MCHC 31.6*  --   --     < > = values in this interval not displayed.     CMP:   Recent Labs   Lab 12/04/18  0430   *   CALCIUM 8.3*   ALBUMIN 3.0*   PROT 4.8*      K 3.2*   CO2 32*   CL 96   BUN 52*   CREATININE 1.0   ALKPHOS 94   ALT 13   AST 18   BILITOT 1.3*     Coagulation: No results for input(s): PT, INR, APTT in the last 168 hours.  Labs within the past 24 hours have been reviewed.    Diagnostic Results:  I have personally reviewed all pertinent imaging studies.    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   - POD 1 US: increased arterial resistive indices, suggestive of edema vs acute rejection vs congestion.  - Repeat US 10/9 with continued elevation of RIs.  - LFTs stable.  - T bili trending down.   - Repeat liver US (10/11): elevated RIs.  - Liver US 11/6 to assess for ascites. No arterial flow within the liver allograft concerning for interval  "thrombosis/occlusion of the arterial system. Severe ascites seen.  - Liver US 11/8: arterial flow seen w/ elevated RIs   - LFTs remain stable. No CTA at this time due to EVA and normal liver function.   - Liver US 11/15 showed "abnormally decreased main hepatic artery peak systolic velocity with slow arterial flow and abnormal tardus parvus waveforms intrahepatically."   -Vascular surgery consulted  - Liver US 11/19 showed no arterial flow on the right and sluggish flow on the left.   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 No further intervention planned at this time as patient's LFTs are stable. Will monitor.  - CT abd/pelvis 11/26 showed: 1. Persistent large volume of ascites. 2. Large right and moderate left pleural effusions with adjacent basilar atelectasis. 3. Anasarca  - IR placed perc drain 11/29 due to suspicion for chyle leak. 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  - Fluid also growing enterococcus and will plan for treatment with Vanc x 10 days per ID recs. Will need repeat fluid cx post treatment.       Sequelae of hyperalimentation    - Monitor.       Tachycardia    - Pt w/ tachycardia but now improved.   - Metoprolol 12.5 mg BID for rate control. HR improved, but still elevated. BP stable.  - 11/30 -> increased Metoprolol to 25 mg BID   - Monitor closely.       Pleural effusion    - Common post liver transplant  - CT 11/26 showed: Large right and moderate left pleural effusions with adjacent basilar atelectasis.  - Pt sp02 stable on RA, but c/o some orthopnea.  - CT abd/pelvis 12/3 showed unchanged b/l pleural effusions (large on R, moderate on L) with bibasilar compressive atelectasis and consolidative changes.  - Continue w/ diuresis.       Chronic kidney disease, stage III (moderate)    - Kidney fx improving  - Monitor       Hypomagnesemia    - Continue PO Mag Oxide  - Continue to monitor.       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   - Continue to monitor.     Hypophosphatemia    - Continue k phos and monitor closely.       Delayed surgical wound healing    - Middle chevron incision opened 11/16 with purulent drainage  - Packed with Aquacel ag at this time. Growing Klebsiella p.   - CT abd/pelvis 11/17- no signs of infection  - 11/17: R side of chevron incision w/ 3 small holes draining purulent fluid, opened and packed w/ aquacel.   - Wound care following.  - Wound w/ increased drainage, so wound care placed vac 11/24/18.  Apprec recs. Extending Ertapenem thru 11/28 per ID.    - Abdomen erythematous and indurated RLQ inferior to chevron.  - CT abd/pelvis 11/26 - no sign of infection  - Last wound vac change 12/4 with improvements noted in wound.    .       Hepatic artery thrombosis of transplanted liver    - Liver US 11/19 showed no arterial flow to R and sluggish flow to L  - Hepatic angiogram 11/19 confirmed HAT  - Liver US 12/4 to evaluate for recanalization pending.  - No further intervention planned at this time. Will continue to monitor.     Other drug-induced pancytopenia    - Decreased plt, wbc, h/h  - See "leukopenia"  - Continue to monitor.     Thrombocytopenia, unspecified    - Monitor.       Alteration in skin integrity    - Monitor.       Leukopenia    - Stopped Cellcept, Bactrim, and Valcyte 10/31   - Cellcept half dose resumed 11/6  - Started Atovaquone 11/6.   - CMV PCR noted to be positive at 129. Will hold off on treatment with valcyte at this time. Monitoring weekly CMV PCRs.  - Cellcept held 11/23 2/2 infection (UTI + wound)  - Continue to monitor.       Azotemia    - CRRT 10/31 for uremic toxin clearance.           UTI (urinary tract infection)    - Urine Cx 10/15 w/ Klebsiella pneumonia.  - Started Ciprofloxacin (10/17-10/19).   - Started on amoxicillin/levo for H. Pylori (completed abx 11/2).  - Pt c/o lower back pain " "11/22. UA w/ trace leuks, moderate wbs, 1+ blood, cx pending  - Pt symptomatic (back pain) & with ESBL in wound previously.  Ertapenem 1g QD x 3 days (started 11/23), per ID recs.    - Given pt cont to have back pain and concern for infection in wound ( thick drainage and increased drainage)-  extended Ertapenem for 4 more days per ID (stop date 11/29/18).         Acute blood loss anemia    - Expected post transplant  - H&H cont to fluctuate.   - FOBT +.  - EGD 10/18 - showed diffuse bleeding with ulcerated duodenal mucosa.  - EGD 11/5 - improvement seen, no active bleeding.  - Resume heparin sq injections 11/6.   - Decrease heparin sq from TID to BID 11/15   - Heparin dc'd 11/17 then resumed a few days later 2/2 risk vs. benefit  - See "anemia requiring transfusions."  - Continue to monitor.             Post LT - Acute renal failure    - Creatinine on arrival 0.8 and trended up.  - Nephrology consulted.  - Renal US (10/11): no hydronephrosis.  - Echo 10/12: diastolic dysfunction, likely 2/2 to ARF.  - CRRT 10/14; SLED 10/17, 10/20. --> no improvements noted in kidney fxn.  - 24 hr urine creatine collection: CrCl of 7.   - CRRT 10/31 with 1L removed.   - Cr level now with improvement.   - See "hypervolemia associated with renal insufficiency"     Hypervolemia associated with renal insufficiency    - CRRT 10/14, 10/17, 10/20, 10/31.  - CXR 10/30: abundant fluid in b/l pleural space.  - Continue 40 mg Lasix BID.  - Monitor daily weights.       EVA (acute kidney injury)    - No plans for RRT in near future, per nephrology  - Trialysis line removed 11/13  - Nephrology involved. Appreciate recs.  - Cr improved & stable.  - Continue to monitor.     At risk for opportunistic infections    - Valcyte for CMV prophylaxis--> holding (10/31) for leukopenia. Pt will need weekly CMV PCR while discontinued.  - CMV PCR noted to be positive at 129. .  - CMV PCR 11/8: 105  - CMV PCR 11/15 with 89 copies  - CMV PCR 11/23 95 - " increased from previous. Continue to hold Valcyte & monitor pt closely.  - CMV PCR 11/29 146 - per ID, will continue to monitor w/ weekly PCRs.  - Bactrim for PCP prophylaxis ([facility]).--> holding (10/31) for leukopenia.   - Start Atovaquone 11/6.  - Fluconazole for fungal prophylaxis (first dose 10/6). D/c Fluc 11/16 bc pt had been on it for > 30 days post take back.       Long-term use of immunosuppressant medication    - Prograf: stopped 10/29. Resumed 11/2.  --> Will monitor for signs of toxic side effects, check daily troughs, and change meds accordingly.   - Prednisone: stopped 10/29. Started on Hydrocortisone. pred taper resumed.  - Cellcept: stopped 10/31 for leukopenia. Restarted Cellcept 500 mg bid, 11/6.  - Cellcept held 11/23 2/2 infection         Prophylactic immunotherapy    - See long term use immunosuppresion.       Chylous ascites    - Paracentesis 10/1, 5L removed, no fluid sent for analysis.  - Pt remains volume overloaded.   - Liver US 11/6 with severe ascites.   - Paracentesis 11/7 with 8.9 L removed. Wbc 39, segs 16%. Gram stain - no organisms seen and no WBC. Cultures NGTD.  - Paracentesis 11/12: 8.6 L removed. 34 wbc, 25% segs, 33 lymphs, 42 monocytes/macrophages. Cultures NGTD  - Paracentesis 11/21 with 4 L removed, neg for infection.  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 Milky appearance, cell count of fluid (51% lymphs), triglycerides 2208, consistent with chyle leak.   - Peritoneal fluid 11/29 growing enterococcus and will plan for treatment with Vanc per ID recs.   - Attempted low fat, high protein diet due to suspicion for chyle leak with no improvement seen in fluid character.  - CT abd/pelvis 12/3 shows small volume abdominal ascites, improved compared to prior, pelvis ascites unchanged from prior, unchanged b/l pleural effusions (large on R, moderate on L) with bibasilar compressive atelectasis and consolidative changes.  - Placed PICC line and started TPN +  octreotide 12/3.  - Continue to monitor.     Weakness    - See physical deconditioning.       Physical deconditioning    - PT/OT consulted.  Progressing well w/ therapy.  - Recommend home health PT and rolling walker at discharge (orders placed 11/6).       Hypotension    - d/c'd midodrine 11/28 as patient has not required it recently  - Continue to monitor.     Anemia requiring transfusions    - Fluctuating H&H.    - Transfusions: 10/14; 10/16; 10/18; 10/19; 10/22; 10/27; 10/29, 10/31, 11/3, 11/21, 11/29, 12/4  - FOBT +  - LDH elevated, Hapto < 10.  - EGD 10/18: ulcerated duodenal mucosa.   - Consulted GI about this ongoing issue and further need of another EGD.  - EGD 11/5 unremarkable.   - Iron studies 11/13:  95, TIBC 107, sat iron 89, transferrin 72, ferritin pending  - Hemolytic anemia workup 11/13: Haptoglobin 132, retic 3.9   - H/H dropped 12/4 - transfused 2u PRBC  - No overt signs of bleeding.  - Fecal occult blood 12/4 pending stool sample  - Continue to monitor.           Severe malnutrition    - Dietary consulted.   - Temporal and distal extremity muscle wasting and edema.  - Encourage supplements and to increase nutritional intake.  - Per nephrology, pt to be on low protein diet.  - Prealbumin reviewed.   - Tolerating diet.  Denies n/v.    -Told pt it's ok to resume outside protein shakes as renal fx stable, but will monitor closely.  - Attempted low fat, high protein diet due to suspicion for chyle leak with no improvement seen in fluid character.  - Placed PICC line and started TPN 12/3.  - Monitor.         Type 2 diabetes mellitus with diabetic polyneuropathy, with long-term current use of insulin    - Continue home regimen.  - TPN started 12/3 and pt now on insulin drip.  - Endocrine following. Appreciate recs.       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  - Tx w/ Entecavir- dose changed to daily 11/26 due to improved renal  fx  - Hep B surface antigen negative 12/3  - Surface AB quant, and Hep B DNA 12/3 pending             VTE Risk Mitigation (From admission, onward)        Ordered     heparin (porcine) injection 5,000 Units  Every 8 hours      11/22/18 1014     IP VTE HIGH RISK PATIENT  Once      11/05/18 1145     Place sequential compression device  Until discontinued      10/05/18 0709          The patients clinical status was discussed at multidisplinary rounds, involving transplant surgery, transplant medicine, pharmacy, nursing, nutrition, and social work    Discharge Planning:  No Patient Care Coordination Note on file.      Elizabeth Dean PA-C  Liver Transplant  [facility]

## 2018-12-04 NOTE — PROGRESS NOTES
AGA Patel RN at bedside for wound vac change. GREGORIO Gonzalez also present to assess wound.  at bedside. Patient up to date on plan of care for today - okay per PA for patient to have small sips of water. Awaiting stool sample for occult blood. Patient denies any needs at this time.

## 2018-12-04 NOTE — ASSESSMENT & PLAN NOTE
- HBsAg+, Received HBIG (last weekly dose 11/2).  - Tx w/ Entecavir- dose changed to daily 11/26 due to improved renal fx  - Hep B surface antigen negative 12/3  - Surface AB quant, and Hep B DNA 12/3 pending

## 2018-12-04 NOTE — ASSESSMENT & PLAN NOTE
- Liver US 11/19 showed no arterial flow to R and sluggish flow to L  - Hepatic angiogram 11/19 confirmed HAT  - Liver US 12/4 to evaluate for recanalization pending.  - No further intervention planned at this time. Will continue to monitor.

## 2018-12-04 NOTE — PLAN OF CARE
Problem: Patient Care Overview  Goal: Plan of Care Review  Outcome: Ongoing (interventions implemented as appropriate)  Patient remains NPO - TPN started last night with insulin gtt.  to 307 this shift with BG checks now Q2H. H/H 6.8/22.4 this am - received 1 unit PRBC. Repeat H/H pending. Additional order noted to transfuse second unit PRBC - will administer upon patient's return from US. Awaiting stool sample to send for occult blood. Potassium 3.2 - replaced with 50mEq Klyte. Wound vac dressing changed per wound care. Right grenade still with milky output. Patient up to chair with walker, 1 person assistance. Reported feeling weak this am - reminded to call for assistance when getting OOB.  and caregiver at bedside throughout day.  utilized to communicate plan of care.

## 2018-12-04 NOTE — ASSESSMENT & PLAN NOTE
- Dietary consulted.   - Temporal and distal extremity muscle wasting and edema.  - Encourage supplements and to increase nutritional intake.  - Per nephrology, pt to be on low protein diet.  - Prealbumin reviewed.   - Tolerating diet.  Denies n/v.    -Told pt it's ok to resume outside protein shakes as renal fx stable, but will monitor closely.  - Attempted low fat, high protein diet due to suspicion for chyle leak with no improvement seen in fluid character.  - Placed PICC line and started TPN 12/3.  - Monitor.

## 2018-12-04 NOTE — PROGRESS NOTES
"[facility]  Endocrinology  Progress Note    Admit Date: 10/1/2018     Reason for Consult: Management of type 2 DM, Hyperglycemia      Surgical Procedure and Date: Liver transplant 10/5/18, Ex lap 10/6/18     Diabetes diagnosis year: 2013     Home Diabetes Medications:  Lantus 18 units HS and novolog 17 units with meals plus correction scale (150-200 +1)        Lab Results   Component Value Date     HGBA1C 6.8 (H) 08/16/2018        How often checking glucose at home? 3-4   BG readings on regimen: 120-150.  Post prandial readings as high as upper 200s  Hypoglycemia on the regimen? yes, none recently   Missed doses on regimen?  No     Diabetes Complications include:     Diabetic peripheral neuropathy      Complicating diabetes co morbidities:   CIRRHOSIS        HPI:  Patient is a 68 y.o. female with a diagnosis of ESLD, listed for liver transplant with meld 23 who underwent live transplant on 10/5/18.  Back to OR on 10/6/18 for ex lap.  Admitted 10/1/18 for hyponatremia s/p paracentesis.  Personal has known diagnosis of diabetes, endocrine consulted for diabetes management.    Post-operative course complicated by ESBL Klebsiella pneumoniae in urine (in contact isolation), peritransplant ascites, worsening renal function (required temporary dialysis), anemia (multiple blood transfusions), and possible GI bleed.     Interval HPI:   Overnight events: Remains in TSU. Hypoglycemia yesterday. TPN started overnight. Insulin infusion started and stopped overnight due to hypogylcemia; BG trending up this AM and insulin infusion resumed. BG above goal this on insulin infusion rates 0.4-1.9 u/hr. Solumedrol 2.5 mg daily; steroid taper.   Eating:   NPO  Nausea: No  Hypoglycemia and intervention: No  Fever: No  TPN:  At 45 cc/hr    /64 (Patient Position: Lying)   Pulse 95   Temp 97.6 °F (36.4 °C) (Oral)   Resp 18   Ht 5' 3" (1.6 m)   Wt 58.6 kg (129 lb 3 oz)   LMP  (LMP Unknown)   SpO2 97%   " Breastfeeding? No   BMI 22.88 kg/m²      Labs Reviewed and Include    Recent Labs   Lab 12/04/18  0430   *   CALCIUM 8.3*   ALBUMIN 3.0*   PROT 4.8*      K 3.2*   CO2 32*   CL 96   BUN 52*   CREATININE 1.0   ALKPHOS 94   ALT 13   AST 18   BILITOT 1.3*     Lab Results   Component Value Date    WBC 2.10 (L) 12/04/2018    HGB 6.8 (L) 12/04/2018    HCT 22.4 (L) 12/04/2018    MCV 91 12/04/2018     (L) 12/04/2018     No results for input(s): TSH, FREET4 in the last 168 hours.  Lab Results   Component Value Date    HGBA1C 6.0 (H) 10/04/2018       Nutritional status:   Body mass index is 22.88 kg/m².  Lab Results   Component Value Date    ALBUMIN 3.0 (L) 12/04/2018    ALBUMIN 2.7 (L) 12/03/2018    ALBUMIN 2.6 (L) 12/02/2018     Lab Results   Component Value Date    PREALBUMIN 15 (L) 11/03/2018    PREALBUMIN 12 (L) 09/17/2018       Estimated Creatinine Clearance: 43.9 mL/min (based on SCr of 1 mg/dL).    Accu-Checks  Recent Labs     12/03/18  2316 12/04/18  0015 12/04/18  0114 12/04/18  0216 12/04/18  0357 12/04/18  0457 12/04/18  0559 12/04/18  0718 12/04/18  0817 12/04/18  0925   POCTGLUCOSE 138* 120* 145* 155* 218* 240* 258* 307* 285* 253*       Current Medications and/or Treatments Impacting Glycemic Control  Immunotherapy:    Immunosuppressants         Stop Route Frequency     tacrolimus capsule 2 mg      -- Oral 2 times daily        Steroids:   Hormones (From admission, onward)    Start     Stop Route Frequency Ordered    12/03/18 1400  octreotide injection 50 mcg      -- SubQ Every 8 hours 12/03/18 1116    12/01/18 0900  predniSONE tablet 2.5 mg      12/08 0859 Oral Daily 11/05/18 1004    10/17/18 0945  predniSONE tablet 15 mg      10/24 0859 Oral Daily 10/17/18 0943        Pressors:    Autonomic Drugs (From admission, onward)    Start     Stop Route Frequency Ordered    10/06/18 1617  rocuronium (ZEMURON) 10 mg/mL injection     Comments:  Created by cabinet override    10/07 0429   10/06/18 2969         Hyperglycemia/Diabetes Medications:   Antihyperglycemics (From admission, onward)    Start     Stop Route Frequency Ordered    12/03/18 2200  insulin regular (Humulin R) 100 Units in sodium chloride 0.9% 100 mL infusion     Question:  Insulin Rate Adjustment (DO NOT MODIFY ANSWER)  Answer:  [email or web address]    -- IV Continuous 12/03/18 1640    10/07/18 1200  insulin regular (Humulin R) 100 Units in sodium chloride 0.9% 100 mL infusion      10/13 2100 IV Continuous 10/07/18 1055          ASSESSMENT and PLAN    * Liver transplanted    Managed per primary team  Avoid hypoglycemia    Recent Labs   Lab 12/04/18  0430   ALT 13   AST 18   ALKPHOS 94   BILITOT 1.3*   PROT 4.8*   ALBUMIN 3.0*            Type 2 diabetes mellitus with diabetic polyneuropathy, with long-term current use of insulin    BG goal 140-180    Continue IV insulin infusion protocol  Requires intensive BG monitoring while on protocol; change to q2 hr given finger sticks  Will try to transition in AM.     Discharge planning:  TBD     Sequelae of hyperalimentation    Likely elevating BG.        Chylous ascites    Paracentesis prn per primary team.  Avoid hypoglycemia  TPN started- NPO     UTI (urinary tract infection)    Infection may increase insulin resistance.          Alteration in skin integrity    Infection may increase insulin resistance.   Wound vac  Optimize BG control.        EVA (acute kidney injury)    Avoid insulin stacking and hypoglycemia.  Lab Results   Component Value Date    CREATININE 1.0 12/04/2018          Prophylactic immunotherapy    May increase insulin resistance.        Severe malnutrition    On TPN.          Aviva Caldera, NP  Endocrinology  [facility]

## 2018-12-04 NOTE — ASSESSMENT & PLAN NOTE
- Fluctuating H&H.    - Transfusions: 10/14; 10/16; 10/18; 10/19; 10/22; 10/27; 10/29, 10/31, 11/3, 11/21, 11/29, 12/4  - FOBT +  - LDH elevated, Hapto < 10.  - EGD 10/18: ulcerated duodenal mucosa.   - Consulted GI about this ongoing issue and further need of another EGD.  - EGD 11/5 unremarkable.   - Iron studies 11/13:  95, TIBC 107, sat iron 89, transferrin 72, ferritin pending  - Hemolytic anemia workup 11/13: Haptoglobin 132, retic 3.9   - H/H dropped 12/4 - transfused 2u PRBC  - No overt signs of bleeding.  - Fecal occult blood 12/4 pending stool sample  - Continue to monitor.

## 2018-12-04 NOTE — ASSESSMENT & PLAN NOTE
- Middle chevron incision opened 11/16 with purulent drainage  - Packed with Aquacel ag at this time. Growing Klebsiella p.   - CT abd/pelvis 11/17- no signs of infection  - 11/17: R side of chevron incision w/ 3 small holes draining purulent fluid, opened and packed w/ aquacel.   - Wound care following.  - Wound w/ increased drainage, so wound care placed vac 11/24/18.  Apprec recs. Extending Ertapenem thru 11/28 per ID.    - Abdomen erythematous and indurated RLQ inferior to chevron.  - CT abd/pelvis 11/26 - no sign of infection  - Last wound vac change 12/4 with improvements noted in wound.    .

## 2018-12-04 NOTE — ASSESSMENT & PLAN NOTE
"- Expected post transplant  - H&H cont to fluctuate.   - FOBT +.  - EGD 10/18 - showed diffuse bleeding with ulcerated duodenal mucosa.  - EGD 11/5 - improvement seen, no active bleeding.  - Resume heparin sq injections 11/6.   - Decrease heparin sq from TID to BID 11/15   - Heparin dc'd 11/17 then resumed a few days later 2/2 risk vs. benefit  - See "anemia requiring transfusions."  - Continue to monitor.         "

## 2018-12-04 NOTE — NURSING
MD Sanchez called to check on pt glucose.  Latest blood sugar @ 0357 = 218.  Ordered to restart insulin gtt at 0.4units/hr.  Recheck blood sugar every hour.  Will continue to monitor.

## 2018-12-04 NOTE — ASSESSMENT & PLAN NOTE
- Paracentesis 10/1, 5L removed, no fluid sent for analysis.  - Pt remains volume overloaded.   - Liver US 11/6 with severe ascites.   - Paracentesis 11/7 with 8.9 L removed. Wbc 39, segs 16%. Gram stain - no organisms seen and no WBC. Cultures NGTD.  - Paracentesis 11/12: 8.6 L removed. 34 wbc, 25% segs, 33 lymphs, 42 monocytes/macrophages. Cultures NGTD  - Paracentesis 11/21 with 4 L removed, neg for infection.  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 Milky appearance, cell count of fluid (51% lymphs), triglycerides 2208, consistent with chyle leak.   - Peritoneal fluid 11/29 growing enterococcus and will plan for treatment with Vanc per ID recs.   - Attempted low fat, high protein diet due to suspicion for chyle leak with no improvement seen in fluid character.  - CT abd/pelvis 12/3 shows small volume abdominal ascites, improved compared to prior, pelvis ascites unchanged from prior, unchanged b/l pleural effusions (large on R, moderate on L) with bibasilar compressive atelectasis and consolidative changes.  - Placed PICC line and started TPN + octreotide 12/3.  - Continue to monitor.

## 2018-12-04 NOTE — ASSESSMENT & PLAN NOTE
Avoid insulin stacking and hypoglycemia.  Lab Results   Component Value Date    CREATININE 1.0 12/04/2018

## 2018-12-04 NOTE — ASSESSMENT & PLAN NOTE
- Continue home regimen.  - TPN started 12/3 and pt now on insulin drip.  - Endocrine following. Appreciate recs.

## 2018-12-04 NOTE — ASSESSMENT & PLAN NOTE
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   - POD 1 US: increased arterial resistive indices, suggestive of edema vs acute rejection vs congestion.  - Repeat US 10/9 with continued elevation of RIs.  - LFTs stable.  - T bili trending down.   - Repeat liver US (10/11): elevated RIs.  - Liver US 11/6 to assess for ascites. No arterial flow within the liver allograft concerning for interval thrombosis/occlusion of the arterial system. Severe ascites seen.  - Liver US 11/8: arterial flow seen w/ elevated RIs   - LFTs remain stable. No CTA at this time due to EVA and normal liver function.   - Liver US 11/15 showed "abnormally decreased main hepatic artery peak systolic velocity with slow arterial flow and abnormal tardus parvus waveforms intrahepatically."   -Vascular surgery consulted  - Liver US 11/19 showed no arterial flow on the right and sluggish flow on the left.   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 No further intervention planned at this time as patient's LFTs are stable. Will monitor.  - CT abd/pelvis 11/26 showed: 1. Persistent large volume of ascites. 2. Large right and moderate left pleural effusions with adjacent basilar atelectasis. 3. Anasarca  - IR placed perc drain 11/29 due to suspicion for chyle leak. 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  - Fluid also growing enterococcus and will plan for treatment with Vanc x 10 days per ID recs. Will need repeat fluid cx post treatment.    "

## 2018-12-04 NOTE — NURSING
2030: Paged endocrinology on call for Blood sugar 80 after 1 hour administering 25g D50.  Clarified order of insulin gtt to start @ 0.5mL/hr with the start of TPN @ 45mL/hr.  Check blood sugar q1h after start of gtts and follow nomogram.      2100: TPN and insulin gtt started.  2205: Blood sugar 80.  Insulin gtt stopped.  TPN continued @45mL/hr.  Recheck blood sugar in 15min per nomogram orders.   2220: recheck blood sugar 83.  Insulin gtt off.  Paged endocrine .  No return call.  2251: recheck blood sugar 68.  Administered 12.5g D50 per MAR order.  Paged endocrine again .  No return call.  Notified Rossy PERKINS of above sequences and ordered to hold off insulin gtt until blood glucose >300 for 2 consecutive hours and restart insulin gtt at ordered rate and follow insulin nomogram.      Will continue to monitor.

## 2018-12-04 NOTE — ASSESSMENT & PLAN NOTE
- Valcyte for CMV prophylaxis--> holding (10/31) for leukopenia. Pt will need weekly CMV PCR while discontinued.  - CMV PCR noted to be positive at 129. .  - CMV PCR 11/8: 105  - CMV PCR 11/15 with 89 copies  - CMV PCR 11/23 95 - increased from previous. Continue to hold Valcyte & monitor pt closely.  - CMV PCR 11/29 146 - per ID, will continue to monitor w/ weekly PCRs.  - Bactrim for PCP prophylaxis ([facility]).--> holding (10/31) for leukopenia.   - Start Atovaquone 11/6.  - Fluconazole for fungal prophylaxis (first dose 10/6). D/c Fluc 11/16 bc pt had been on it for > 30 days post take back.

## 2018-12-05 LAB
ALBUMIN SERPL BCP-MCNC: 3.1 G/DL
ALP SERPL-CCNC: 114 U/L
ALT SERPL W/O P-5'-P-CCNC: 31 U/L
ANION GAP SERPL CALC-SCNC: 9 MMOL/L
AST SERPL-CCNC: 37 U/L
BASOPHILS # BLD AUTO: 0.02 K/UL
BASOPHILS NFR BLD: 0.6 %
BILIRUB SERPL-MCNC: 2 MG/DL
BUN SERPL-MCNC: 49 MG/DL
CALCIUM SERPL-MCNC: 8.9 MG/DL
CHLORIDE SERPL-SCNC: 95 MMOL/L
CO2 SERPL-SCNC: 34 MMOL/L
CREAT SERPL-MCNC: 1 MG/DL
DIFFERENTIAL METHOD: ABNORMAL
EOSINOPHIL # BLD AUTO: 0.1 K/UL
EOSINOPHIL NFR BLD: 1.6 %
ERYTHROCYTE [DISTWIDTH] IN BLOOD BY AUTOMATED COUNT: 15.5 %
EST. GFR  (AFRICAN AMERICAN): >60 ML/MIN/1.73 M^2
EST. GFR  (NON AFRICAN AMERICAN): 57.6 ML/MIN/1.73 M^2
GLUCOSE SERPL-MCNC: 272 MG/DL
HBV DNA SERPL NAA+PROBE-ACNC: <10 IU/ML
HBV DNA SERPL NAA+PROBE-LOG IU: <1 LOG (10) IU/ML
HBV DNA SERPL QL NAA+PROBE: NOT DETECTED
HBV SURFACE AB SER QL IA: POSITIVE
HBV SURFACE AB SERPL IA-ACNC: 571 MIU/ML
HCT VFR BLD AUTO: 33.1 %
HGB BLD-MCNC: 10.8 G/DL
IMM GRANULOCYTES # BLD AUTO: 0.13 K/UL
IMM GRANULOCYTES NFR BLD AUTO: 4.1 %
LYMPHOCYTES # BLD AUTO: 0.3 K/UL
LYMPHOCYTES NFR BLD: 10.2 %
MAGNESIUM SERPL-MCNC: 2 MG/DL
MCH RBC QN AUTO: 29.3 PG
MCHC RBC AUTO-ENTMCNC: 32.6 G/DL
MCV RBC AUTO: 90 FL
MONOCYTES # BLD AUTO: 0.4 K/UL
MONOCYTES NFR BLD: 14 %
NEUTROPHILS # BLD AUTO: 2.2 K/UL
NEUTROPHILS NFR BLD: 69.5 %
NRBC BLD-RTO: 0 /100 WBC
PHOSPHATE SERPL-MCNC: 3 MG/DL
PLATELET # BLD AUTO: 117 K/UL
PMV BLD AUTO: 9.8 FL
POCT GLUCOSE: 165 MG/DL (ref 70–110)
POCT GLUCOSE: 171 MG/DL (ref 70–110)
POCT GLUCOSE: 199 MG/DL (ref 70–110)
POCT GLUCOSE: 202 MG/DL (ref 70–110)
POCT GLUCOSE: 205 MG/DL (ref 70–110)
POCT GLUCOSE: 213 MG/DL (ref 70–110)
POCT GLUCOSE: 228 MG/DL (ref 70–110)
POCT GLUCOSE: 282 MG/DL (ref 70–110)
POCT GLUCOSE: 286 MG/DL (ref 70–110)
POTASSIUM SERPL-SCNC: 4.1 MMOL/L
PROT SERPL-MCNC: 5.4 G/DL
RBC # BLD AUTO: 3.68 M/UL
SODIUM SERPL-SCNC: 138 MMOL/L
WBC # BLD AUTO: 3.15 K/UL

## 2018-12-05 PROCEDURE — A4217 STERILE WATER/SALINE, 500 ML: HCPCS | Performed by: PHYSICIAN ASSISTANT

## 2018-12-05 PROCEDURE — 25000003 PHARM REV CODE 250: Performed by: NURSE PRACTITIONER

## 2018-12-05 PROCEDURE — 99233 SBSQ HOSP IP/OBS HIGH 50: CPT | Mod: ,,, | Performed by: NURSE PRACTITIONER

## 2018-12-05 PROCEDURE — 25000003 PHARM REV CODE 250: Performed by: PHYSICIAN ASSISTANT

## 2018-12-05 PROCEDURE — 99233 SBSQ HOSP IP/OBS HIGH 50: CPT | Mod: 24,,, | Performed by: PHYSICIAN ASSISTANT

## 2018-12-05 PROCEDURE — 94799 UNLISTED PULMONARY SVC/PX: CPT

## 2018-12-05 PROCEDURE — 63600175 PHARM REV CODE 636 W HCPCS: Performed by: NURSE PRACTITIONER

## 2018-12-05 PROCEDURE — 63600175 PHARM REV CODE 636 W HCPCS: Performed by: PHYSICIAN ASSISTANT

## 2018-12-05 PROCEDURE — 99900035 HC TECH TIME PER 15 MIN (STAT)

## 2018-12-05 PROCEDURE — 25000003 PHARM REV CODE 250: Performed by: INTERNAL MEDICINE

## 2018-12-05 PROCEDURE — A4216 STERILE WATER/SALINE, 10 ML: HCPCS | Performed by: TRANSPLANT SURGERY

## 2018-12-05 PROCEDURE — 83735 ASSAY OF MAGNESIUM: CPT

## 2018-12-05 PROCEDURE — 85025 COMPLETE CBC W/AUTO DIFF WBC: CPT

## 2018-12-05 PROCEDURE — 80053 COMPREHEN METABOLIC PANEL: CPT

## 2018-12-05 PROCEDURE — 97530 THERAPEUTIC ACTIVITIES: CPT

## 2018-12-05 PROCEDURE — 84100 ASSAY OF PHOSPHORUS: CPT

## 2018-12-05 PROCEDURE — 63600175 PHARM REV CODE 636 W HCPCS: Performed by: INTERNAL MEDICINE

## 2018-12-05 PROCEDURE — 25000003 PHARM REV CODE 250: Performed by: TRANSPLANT SURGERY

## 2018-12-05 PROCEDURE — 94664 DEMO&/EVAL PT USE INHALER: CPT

## 2018-12-05 PROCEDURE — 27000646 HC AEROBIKA DEVICE

## 2018-12-05 PROCEDURE — 20600001 HC STEP DOWN PRIVATE ROOM

## 2018-12-05 RX ORDER — IBUPROFEN 200 MG
16 TABLET ORAL
Status: DISCONTINUED | OUTPATIENT
Start: 2018-12-05 | End: 2018-12-10

## 2018-12-05 RX ORDER — INSULIN ASPART 100 [IU]/ML
0-4 INJECTION, SOLUTION INTRAVENOUS; SUBCUTANEOUS
Status: DISCONTINUED | OUTPATIENT
Start: 2018-12-05 | End: 2018-12-10

## 2018-12-05 RX ORDER — GLUCAGON 1 MG
1 KIT INJECTION
Status: DISCONTINUED | OUTPATIENT
Start: 2018-12-05 | End: 2018-12-10

## 2018-12-05 RX ORDER — IBUPROFEN 200 MG
24 TABLET ORAL
Status: DISCONTINUED | OUTPATIENT
Start: 2018-12-05 | End: 2018-12-10

## 2018-12-05 RX ORDER — FUROSEMIDE 40 MG/1
40 TABLET ORAL DAILY
Status: DISCONTINUED | OUTPATIENT
Start: 2018-12-06 | End: 2018-12-05

## 2018-12-05 RX ADMIN — CALCIUM GLUCONATE: 94 INJECTION, SOLUTION INTRAVENOUS at 09:12

## 2018-12-05 RX ADMIN — METOPROLOL TARTRATE 25 MG: 25 TABLET ORAL at 08:12

## 2018-12-05 RX ADMIN — PREDNISONE 2.5 MG: 2.5 TABLET ORAL at 08:12

## 2018-12-05 RX ADMIN — Medication 10 ML: at 05:12

## 2018-12-05 RX ADMIN — Medication 10 ML: at 12:12

## 2018-12-05 RX ADMIN — SODIUM CHLORIDE 1.6 UNITS/HR: 9 INJECTION, SOLUTION INTRAVENOUS at 10:12

## 2018-12-05 RX ADMIN — VANCOMYCIN HYDROCHLORIDE 1000 MG: 1 INJECTION, POWDER, LYOPHILIZED, FOR SOLUTION INTRAVENOUS at 04:12

## 2018-12-05 RX ADMIN — ENTECAVIR 0.5 MG: 0.5 TABLET ORAL at 08:12

## 2018-12-05 RX ADMIN — OCTREOTIDE ACETATE 50 MCG: 100 INJECTION, SOLUTION INTRAVENOUS; SUBCUTANEOUS at 06:12

## 2018-12-05 RX ADMIN — ATOVAQUONE 1500 MG: 750 SUSPENSION ORAL at 08:12

## 2018-12-05 RX ADMIN — DIBASIC SODIUM PHOSPHATE, MONOBASIC POTASSIUM PHOSPHATE AND MONOBASIC SODIUM PHOSPHATE 2 TABLET: 852; 155; 130 TABLET ORAL at 09:12

## 2018-12-05 RX ADMIN — OCTREOTIDE ACETATE 50 MCG: 100 INJECTION, SOLUTION INTRAVENOUS; SUBCUTANEOUS at 09:12

## 2018-12-05 RX ADMIN — DIBASIC SODIUM PHOSPHATE, MONOBASIC POTASSIUM PHOSPHATE AND MONOBASIC SODIUM PHOSPHATE 2 TABLET: 852; 155; 130 TABLET ORAL at 08:12

## 2018-12-05 RX ADMIN — OCTREOTIDE ACETATE 50 MCG: 100 INJECTION, SOLUTION INTRAVENOUS; SUBCUTANEOUS at 01:12

## 2018-12-05 RX ADMIN — ASPIRIN 81 MG CHEWABLE TABLET 81 MG: 81 TABLET CHEWABLE at 08:12

## 2018-12-05 RX ADMIN — MAGNESIUM OXIDE TAB 400 MG (241.3 MG ELEMENTAL MG) 800 MG: 400 (241.3 MG) TAB at 09:12

## 2018-12-05 RX ADMIN — MAGNESIUM OXIDE TAB 400 MG (241.3 MG ELEMENTAL MG) 800 MG: 400 (241.3 MG) TAB at 08:12

## 2018-12-05 RX ADMIN — INSULIN ASPART 3 UNITS: 100 INJECTION, SOLUTION INTRAVENOUS; SUBCUTANEOUS at 01:12

## 2018-12-05 RX ADMIN — PANTOPRAZOLE SODIUM 40 MG: 40 TABLET, DELAYED RELEASE ORAL at 08:12

## 2018-12-05 RX ADMIN — HEPARIN SODIUM 5000 UNITS: 5000 INJECTION, SOLUTION INTRAVENOUS; SUBCUTANEOUS at 09:12

## 2018-12-05 RX ADMIN — TACROLIMUS 2 MG: 1 CAPSULE ORAL at 05:12

## 2018-12-05 RX ADMIN — INSULIN ASPART 3 UNITS: 100 INJECTION, SOLUTION INTRAVENOUS; SUBCUTANEOUS at 05:12

## 2018-12-05 RX ADMIN — HEPARIN SODIUM 5000 UNITS: 5000 INJECTION, SOLUTION INTRAVENOUS; SUBCUTANEOUS at 06:12

## 2018-12-05 RX ADMIN — METOPROLOL TARTRATE 25 MG: 25 TABLET ORAL at 09:12

## 2018-12-05 RX ADMIN — TACROLIMUS 2 MG: 1 CAPSULE ORAL at 08:12

## 2018-12-05 RX ADMIN — FUROSEMIDE 40 MG: 40 TABLET ORAL at 08:12

## 2018-12-05 RX ADMIN — INSULIN ASPART 1 UNITS: 100 INJECTION, SOLUTION INTRAVENOUS; SUBCUTANEOUS at 09:12

## 2018-12-05 RX ADMIN — PANTOPRAZOLE SODIUM 40 MG: 40 TABLET, DELAYED RELEASE ORAL at 09:12

## 2018-12-05 RX ADMIN — HEPARIN SODIUM 5000 UNITS: 5000 INJECTION, SOLUTION INTRAVENOUS; SUBCUTANEOUS at 01:12

## 2018-12-05 NOTE — SUBJECTIVE & OBJECTIVE
"Interval HPI:   Overnight events: No acute events overnight. BG range from 165-226 on IIP 1.6-2.5 u/hr. Remains in TSU. Contact precautions in place. Wound vac.   Eating:   NPO  Nausea: No  Hypoglycemia and intervention: No  Fever: No  TPN and/or TF: Yes: Dextrose 70%, k 60meq, 1190kcal  If yes, type of TF/TPN and rate: 50 ml/hr    /78   Pulse 85   Temp 98.3 °F (36.8 °C)   Resp 18   Ht 5' 3" (1.6 m)   Wt 55.3 kg (121 lb 12.9 oz)   LMP  (LMP Unknown)   SpO2 97%   Breastfeeding? No   BMI 21.58 kg/m²     Labs Reviewed and Include    Recent Labs   Lab 12/05/18  1202   *   CALCIUM 8.9   ALBUMIN 3.1*   PROT 5.4*      K 4.1   CO2 34*   CL 95   BUN 49*   CREATININE 1.0   ALKPHOS 114   ALT 31   AST 37   BILITOT 2.0*     Lab Results   Component Value Date    WBC 3.15 (L) 12/05/2018    HGB 10.8 (L) 12/05/2018    HCT 33.1 (L) 12/05/2018    MCV 90 12/05/2018     (L) 12/05/2018     No results for input(s): TSH, FREET4 in the last 168 hours.  Lab Results   Component Value Date    HGBA1C 6.0 (H) 10/04/2018       Nutritional status:   Body mass index is 21.58 kg/m².  Lab Results   Component Value Date    ALBUMIN 3.1 (L) 12/05/2018    ALBUMIN 3.0 (L) 12/04/2018    ALBUMIN 2.7 (L) 12/03/2018     Lab Results   Component Value Date    PREALBUMIN 15 (L) 11/03/2018    PREALBUMIN 12 (L) 09/17/2018       Estimated Creatinine Clearance: 43.9 mL/min (based on SCr of 1 mg/dL).    Accu-Checks  Recent Labs     12/04/18  1750 12/04/18  2012 12/04/18  2225 12/05/18  0016 12/05/18  0234 12/05/18  0418 12/05/18  0601 12/05/18  0802 12/05/18  1003 12/05/18  1356   POCTGLUCOSE 201* 182* 226* 213* 165* 171* 202* 199* 228* 282*       Current Medications and/or Treatments Impacting Glycemic Control  Immunotherapy:    Immunosuppressants         Stop Route Frequency     tacrolimus capsule 2 mg      -- Oral 2 times daily        Steroids:   Hormones (From admission, onward)    Start     Stop Route Frequency Ordered    " 12/03/18 1400  octreotide injection 50 mcg      -- SubQ Every 8 hours 12/03/18 1116    12/01/18 0900  predniSONE tablet 2.5 mg      12/08 0859 Oral Daily 11/05/18 1004    10/17/18 0945  predniSONE tablet 15 mg      10/24 0859 Oral Daily 10/17/18 0943        Pressors:    Autonomic Drugs (From admission, onward)    Start     Stop Route Frequency Ordered    10/06/18 1617  rocuronium (ZEMURON) 10 mg/mL injection     Comments:  Created by cabinet override    10/07 0429   10/06/18 1617        Hyperglycemia/Diabetes Medications:   Antihyperglycemics (From admission, onward)    Start     Stop Route Frequency Ordered    12/05/18 1115  insulin regular (Humulin R) 100 Units in sodium chloride 0.9% 100 mL infusion      -- IV Continuous 12/05/18 1012    12/05/18 1111  insulin aspart U-100 pen 0-4 Units      -- SubQ As needed (PRN) 12/05/18 1012    10/07/18 1200  insulin regular (Humulin R) 100 Units in sodium chloride 0.9% 100 mL infusion      10/13 2100 IV Continuous 10/07/18 1055

## 2018-12-05 NOTE — ASSESSMENT & PLAN NOTE
- Liver US 11/19 showed no arterial flow to R and sluggish flow to L  - Hepatic angiogram 11/19 confirmed HAT  - Liver US 12/4 to evaluate for recanalization reviewed. Recanalization possible. Will continue to monitor.  - No further intervention planned at this time. Will continue to monitor.

## 2018-12-05 NOTE — ASSESSMENT & PLAN NOTE
- HBsAg+, Received HBIG (last weekly dose 11/2).  - Tx w/ Entecavir- dose changed to daily 11/26 due to improved renal fx  - 12/3 Hep B surface antigen negative 12/3, surface AB quant 571, and Hep B DNA <10.  - Continue to monitor, per protocol.

## 2018-12-05 NOTE — SUBJECTIVE & OBJECTIVE
Scheduled Meds:   aspirin  81 mg Oral Daily    atovaquone  1,500 mg Oral Daily    entecavir  0.5 mg Oral Daily    heparin (porcine)  5,000 Units Subcutaneous Q8H    k phos di & mono-sod phos mono  2 tablet Oral BID    magnesium oxide  800 mg Oral BID    metoprolol tartrate  25 mg Oral BID    octreotide  50 mcg Subcutaneous Q8H    pantoprazole  40 mg Oral BID    predniSONE  2.5 mg Oral Daily    sodium chloride 0.9%  10 mL Intravenous Q6H    tacrolimus  2 mg Oral BID    vancomycin (VANCOCIN) IVPB  1,000 mg Intravenous Q24H     Continuous Infusions:   insulin (HUMAN R) infusion (adults) 1.9 Units/hr (12/05/18 1434)    TPN ADULT CENTRAL LINE CUSTOM 50 mL/hr at 12/04/18 2027    TPN ADULT CENTRAL LINE CUSTOM       PRN Meds:sodium chloride, sodium chloride, sodium chloride, acetaminophen, bisacodyl, calcium carbonate, dextrose 50%, dextrose 50%, glucagon (human recombinant), glucose, glucose, insulin aspart U-100, omnipaque, ondansetron, ondansetron, polyethylene glycol, Flushing PICC Protocol **AND** sodium chloride 0.9% **AND** sodium chloride 0.9%, sodium chloride 0.9%, traMADol    Review of Systems   Constitutional: Positive for activity change. Negative for chills, fatigue and fever.   HENT: Negative for congestion, facial swelling and trouble swallowing.    Eyes: Negative for pain, discharge and visual disturbance.   Respiratory: Negative for cough, chest tightness, shortness of breath and wheezing.    Cardiovascular: Positive for leg swelling. Negative for chest pain and palpitations.   Gastrointestinal: Positive for abdominal distention and abdominal pain (improving). Negative for constipation, diarrhea, nausea and vomiting.   Genitourinary: Negative for decreased urine volume, difficulty urinating and dysuria.   Musculoskeletal: Positive for back pain.Negative for arthralgias and neck stiffness.   Skin: Positive for wound.   Allergic/Immunologic: Positive for immunocompromised state.    Neurological: Positive for weakness. Negative for tremors and headaches.   Psychiatric/Behavioral: Negative for confusion and agitation.     Objective:     Vital Signs (Most Recent):  Temp: 98.4 °F (36.9 °C) (12/05/18 1107)  Pulse: 85 (12/05/18 1200)  Resp: 18 (12/05/18 1200)  BP: 123/78 (12/05/18 1200)  SpO2: 97 % (12/05/18 1200) Vital Signs (24h Range):  Temp:  [98.3 °F (36.8 °C)-98.8 °F (37.1 °C)] 98.4 °F (36.9 °C)  Pulse:  [] 85  Resp:  [16-25] 18  SpO2:  [94 %-97 %] 97 %  BP: ()/(58-81) 123/78     Weight: 55.3 kg (121 lb 12.9 oz)  Body mass index is 21.58 kg/m².    Intake/Output - Last 3 Shifts       12/03 0700 - 12/04 0659 12/04 0700 - 12/05 0659 12/05 0700 - 12/06 0659    P.O. 0 300     I.V. (mL/kg) 101.7 (1.7) 45.9 (0.8)     Blood  540     Other  0     IV Piggyback 250 250      1027.8     Total Intake(mL/kg) 756.7 (12.9) 2163.7 (39.2)     Urine (mL/kg/hr) 0 (0) 1150 (0.9)     Emesis/NG output 0 0     Drains 580 500 90    Other 0 0     Stool 0 0     Blood  0     Total Output 580 1650 90    Net +176.7 +513.7 -90           Urine Occurrence 3 x 3 x     Stool Occurrence 1 x 0 x     Emesis Occurrence 0 x 0 x           Physical Exam   Constitutional: She is oriented to person, place, and time. She appears well-developed. No distress.   Temporal and distal extremity muscle wasting   HENT:   Head: Normocephalic and atraumatic.   Eyes: EOM are normal. No scleral icterus.   Neck: Normal range of motion.   Cardiovascular: Normal rate, regular rhythm and normal heart sounds.   No murmur heard.  Pulmonary/Chest: Effort normal. No respiratory distress. She has no wheezes.   Breath sounds decreased at bases   Abdominal: Soft. Bowel sounds are normal. She exhibits distension. There is tenderness. There is no guarding.   Wound vac placed to middle chevron incision + R lateral side, no s/s/i  Erythema + induration RLQ, improving  RLQ VALORIE drain sites c/d/i with sutures   Musculoskeletal: Normal range of  motion. She exhibits edema (2+ BLE, + generalized).   Neurological: She is alert and oriented to person, place, and time.   Skin: Skin is warm. She is not diaphoretic.   Psychiatric: She has a normal mood and affect.   Nursing note and vitals reviewed.      Laboratory:  Immunosuppressants         Stop Route Frequency     tacrolimus capsule 2 mg      -- Oral 2 times daily        CBC:   Recent Labs   Lab 12/05/18  1202   WBC 3.15*   RBC 3.68*   HGB 10.8*   HCT 33.1*   *   MCV 90   MCH 29.3   MCHC 32.6     CMP:   Recent Labs   Lab 12/05/18  1202   *   CALCIUM 8.9   ALBUMIN 3.1*   PROT 5.4*      K 4.1   CO2 34*   CL 95   BUN 49*   CREATININE 1.0   ALKPHOS 114   ALT 31   AST 37   BILITOT 2.0*     Labs within the past 24 hours have been reviewed.    Diagnostic Results:  I have personally reviewed all pertinent imaging studies.Scheduled Meds:   aspirin  81 mg Oral Daily    atovaquone  1,500 mg Oral Daily    entecavir  0.5 mg Oral Daily    heparin (porcine)  5,000 Units Subcutaneous Q8H    k phos di & mono-sod phos mono  2 tablet Oral BID    magnesium oxide  800 mg Oral BID    metoprolol tartrate  25 mg Oral BID    octreotide  50 mcg Subcutaneous Q8H    pantoprazole  40 mg Oral BID    predniSONE  2.5 mg Oral Daily    sodium chloride 0.9%  10 mL Intravenous Q6H    tacrolimus  2 mg Oral BID    vancomycin (VANCOCIN) IVPB  1,000 mg Intravenous Q24H     Continuous Infusions:   insulin (HUMAN R) infusion (adults) 1.9 Units/hr (12/05/18 1434)    TPN ADULT CENTRAL LINE CUSTOM 50 mL/hr at 12/04/18 2027    TPN ADULT CENTRAL LINE CUSTOM       PRN Meds:sodium chloride, sodium chloride, sodium chloride, acetaminophen, bisacodyl, calcium carbonate, dextrose 50%, dextrose 50%, glucagon (human recombinant), glucose, glucose, insulin aspart U-100, omnipaque, ondansetron, ondansetron, polyethylene glycol, Flushing PICC Protocol **AND** sodium chloride 0.9% **AND** sodium chloride 0.9%, sodium chloride  0.9%, traMADol    Review of Systems   Gastrointestinal: Abdominal pain: improving.     Objective:     Vital Signs (Most Recent):  Temp: 98.4 °F (36.9 °C) (12/05/18 1107)  Pulse: 85 (12/05/18 1200)  Resp: 18 (12/05/18 1200)  BP: 123/78 (12/05/18 1200)  SpO2: 97 % (12/05/18 1200) Vital Signs (24h Range):  Temp:  [98.3 °F (36.8 °C)-98.8 °F (37.1 °C)] 98.4 °F (36.9 °C)  Pulse:  [] 85  Resp:  [16-25] 18  SpO2:  [94 %-97 %] 97 %  BP: ()/(58-81) 123/78     Weight: 55.3 kg (121 lb 12.9 oz)  Body mass index is 21.58 kg/m².    Intake/Output - Last 3 Shifts       12/03 0700 - 12/04 0659 12/04 0700 - 12/05 0659 12/05 0700 - 12/06 0659    P.O. 0 300     I.V. (mL/kg) 101.7 (1.7) 45.9 (0.8)     Blood  540     Other  0     IV Piggyback 250 250      1027.8     Total Intake(mL/kg) 756.7 (12.9) 2163.7 (39.2)     Urine (mL/kg/hr) 0 (0) 1150 (0.9)     Emesis/NG output 0 0     Drains 580 500 90    Other 0 0     Stool 0 0     Blood  0     Total Output 580 1650 90    Net +176.7 +513.7 -90           Urine Occurrence 3 x 3 x     Stool Occurrence 1 x 0 x     Emesis Occurrence 0 x 0 x           Physical Exam      Laboratory:  Immunosuppressants         Stop Route Frequency     tacrolimus capsule 2 mg      -- Oral 2 times daily        CBC:   Recent Labs   Lab 12/05/18  1202   WBC 3.15*   RBC 3.68*   HGB 10.8*   HCT 33.1*   *   MCV 90   MCH 29.3   MCHC 32.6     CMP:   Recent Labs   Lab 12/05/18  1202   *   CALCIUM 8.9   ALBUMIN 3.1*   PROT 5.4*      K 4.1   CO2 34*   CL 95   BUN 49*   CREATININE 1.0   ALKPHOS 114   ALT 31   AST 37   BILITOT 2.0*     Coagulation: No results for input(s): PT, INR, APTT in the last 168 hours.  Labs within the past 24 hours have been reviewed.    Diagnostic Results:  I have personally reviewed all pertinent imaging studies.

## 2018-12-05 NOTE — ASSESSMENT & PLAN NOTE
- Common post liver transplant  - CT 11/26 showed: Large right and moderate left pleural effusions with adjacent basilar atelectasis.  - Pt sp02 stable on RA, but c/o some orthopnea.  - CT abd/pelvis 12/3 showed unchanged b/l pleural effusions (large on R, moderate on L) with bibasilar compressive atelectasis and consolidative changes.  - Sp02 stable & no signs of respiratory infection. Will continue to monitor.

## 2018-12-05 NOTE — ASSESSMENT & PLAN NOTE
BG goal 140-180    Change IV insulin infusion protocol to Transition drip: Rate of 1.9 with Low SQCorrection scale.  change to q4 hr given finger sticks      Discharge planning:  TBD

## 2018-12-05 NOTE — PT/OT/SLP PROGRESS
"Physical Therapy Treatment    Patient Name:  Scarlett Reddy   MRN:  [id number]    Recommendations:     Discharge Recommendations:  home health PT, home health OT   Discharge Equipment Recommendations: walker, rolling   Barriers to discharge: None    Assessment:     Scarlett Reddy is a 68 y.o. female admitted with a medical diagnosis of Liver transplanted.  She presents with the following impairments/functional limitations:  weakness, impaired functional mobilty, gait instability, impaired endurance, impaired balance, impaired self care skills, impaired cardiopulmonary response to activity.  Pt tolerated session c c/o fatigue.  Pt c limited participation on this date d/t fatigue and only willing to perform transfer training.  Pt demo improvements in sit>stand requiring less assistance but continues to report difficulty completing task.  Pt performed transfer c min A - demo improved anterior weight-shift, using BUE for push-off and reaching back for sitting surface for improved safety.  Pt would benefit from continued skilled acute PT 4x/wk to improve functional mobility.      Rehab Prognosis:  good; patient would benefit from acute skilled PT services to address these deficits and reach maximum level of function.      Recent Surgery: Procedure(s) (LRB):  ANGIOGRAM-HEPATIC (Left) 16 Days Post-Op    Plan:     During this hospitalization, patient to be seen 4 x/week to address the above listed problems via gait training, therapeutic activities, therapeutic exercises, neuromuscular re-education  · Plan of Care Expires:  12/15/18   Plan of Care Reviewed with: patient    Subjective     Communicated with RN prior to session.  Patient found UIC upon PT entry to room, agreeable to treatment.      Chief Complaint: fatigue; weakness  Patient comments/goals: "It's hard to stand."  Pain/Comfort:  · Pain Rating 1: 0/10    Patients cultural, spiritual, Buddhist conflicts given the current situation: none    Objective:     Patient " found with: VALORIE drain, peripheral IV, wound vac, telemetry     General Precautions: Standard, fall, contact   Orthopedic Precautions:N/A   Braces: N/A     Functional Mobility:  · Transfers:     · Sit to Stand:  minimum assistance with armrests  · Balance: sitting (I); standing (CGA)      AM-PAC 6 CLICK MOBILITY  Turning over in bed (including adjusting bedclothes, sheets and blankets)?: 3  Sitting down on and standing up from a chair with arms (e.g., wheelchair, bedside commode, etc.): 3  Moving from lying on back to sitting on the side of the bed?: 3  Moving to and from a bed to a chair (including a wheelchair)?: 3  Need to walk in hospital room?: 3  Climbing 3-5 steps with a railing?: 2  Basic Mobility Total Score: 17       Therapeutic Activities and Exercises:  Pt educated on: PT role/POC; safety c mobility; benefits of OOB activities; performing therex; d/c recs - v/u  Sit<>stand from bedside chair - 10x  Therex (LAQ, hip flex) 1x10    Patient left up in chair with all lines intact, call button in reach and RN notified..    GOALS:   Multidisciplinary Problems     Physical Therapy Goals        Problem: Physical Therapy Goal    Goal Priority Disciplines Outcome Goal Variances Interventions   Physical Therapy Goal     PT, PT/OT Ongoing (interventions implemented as appropriate)     Description:  Goals to be met by: 2018    Patient will increase functional independence with mobility by performin. Supine to sit with Contact Guard Assistance - not met  2. Sit to stand transfer with Supervision using LRAD or no AD - not met  3. Bed to chair transfer with Supervision using LRAD or no AD - not met  4. Gait  x 250 feet with Supervision using LRAD or no AD - not met  5. Stand for 3 minutes with Supervision to increase activity tolerance - not met   6. Lower extremity exercise program x15 reps per handout, with independence - not met                             Time Tracking:     PT Received On: 18  PT  Start Time: 1355     PT Stop Time: 1410  PT Total Time (min): 15 min     Billable Minutes: Therapeutic Activity 15 min    Treatment Type: Treatment  PT/PTA: PT     PTA Visit Number: 1     Marcelino Bird PT  12/05/2018

## 2018-12-05 NOTE — ASSESSMENT & PLAN NOTE
Managed per primary team  Avoid hypoglycemia  Lab Results   Component Value Date    K 4.1 12/05/2018     Lab Results   Component Value Date    BUN 49 (H) 12/05/2018     Lab Results   Component Value Date    CREATININE 1.0 12/05/2018

## 2018-12-05 NOTE — ASSESSMENT & PLAN NOTE
- Paracentesis 10/1, 5L removed, no fluid sent for analysis.  - Pt remains volume overloaded.   - Liver US 11/6 with severe ascites.   - Paracentesis 11/7 with 8.9 L removed. Wbc 39, segs 16%. Gram stain - no organisms seen and no WBC. Cultures NGTD.  - Paracentesis 11/12: 8.6 L removed. 34 wbc, 25% segs, 33 lymphs, 42 monocytes/macrophages. Cultures NGTD  - Paracentesis 11/21 with 4 L removed, neg for infection.  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 Milky appearance, cell count of fluid (51% lymphs), triglycerides 2208, consistent with chyle leak.   - Peritoneal fluid 11/29 growing enterococcus and will plan for treatment with Vanc x 10 days (12/3-12/13) per ID recs.   - Attempted low fat, high protein diet due to suspicion for chyle leak with no improvement seen in fluid character.  - CT abd/pelvis 12/3 shows small volume abdominal ascites, improved compared to prior, pelvis ascites unchanged from prior, unchanged b/l pleural effusions (large on R, moderate on L) with bibasilar compressive atelectasis and consolidative changes.  - Placed PICC line and started TPN + octreotide 12/3. Some improvement in color of fluid seen 12/5. Will continue with TPN, but allow pt to have clear liquids.  - Continue to monitor.

## 2018-12-05 NOTE — PROGRESS NOTES
[facility]  Liver Transplant  Progress Note    Patient Name: Scarlett Reddy  MRN: [id number]  Admission Date: 10/1/2018  Hospital Length of Stay: 64 days  Code Status: Full Code  Primary Care Provider: Primary Doctor No  Post-Operative Day: 61    ORGAN:   LIVER  Disease Etiology: Cirrhosis: Type B and D  Donor Type:    - Brain Death  CDC High Risk:   No  Donor CMV Status:   Donor CMV Status: Positive  Donor HBcAB:   Negative  Donor HCV Status:   Negative  Donor HBV SETH: Negative  Donor HCV SETH: Negative  Whole or Partial: Whole Liver  Biliary Anastomosis: End to End  Arterial Anatomy: Replaced Left Hepatic and Right Hepatic  Subjective:     History of Present Illness:  Ms. Reddy is a 69 y/o female with PMH of ESLD secondary Hep B and D.  Listed for liver transplant with MELD 23.  Paracentesis 10/1 with 5L removed, no fluid sent for analysis.  Morning labs significant for hyponatremia, Na 119.  Pt admitted to LTS for sodium monitoring.      Hospital Course: 69 y/o F h/o HBV/delta cirrhosis s/p DBDLT 10/5/18 (CMV D+/R+, steroid induction, MMF/tacro/pred maintenance) with take back on 10/6 2/2 hyperbilirubinema and bilious VALORIE drainage, no leak identified. Post-op course c/b hypercapneic and hypoxic respiratory failure and was reintubated though quickly extubated now doing well. Liver bx (10/6) sig for Zone 3 hemorrhage and collapse, in addition to cholestasis. Transferred from ICU on POD #4. ID consulted to comment on positive diphtheroid culture from ascitic fluid (likely skin colonization) as well as ESBL Klebsiella pneumoniae in urine culture (10/4).  Pt asymptomatic and cell count from ascitic fluid unremarkable. Per ID recs, no need to treat diphtheroids or asymptomatic ESBL Kleb pneumo bacteriuria though pt has had 6 days of therapy which would cover these organisms. Mild peritransplant ascites- repeat US 10/6 with increased arterial resistive indices. Repeat US 10/9 w/ continued  elevation in RIs and fluid collections. LFTs trending down nicely.     Acute gradual worsening of renal function s/p OLTx. Creatinine on arrival 0.8 and has been up trending. Nephrology consulted for post-op EVA. Cr cont to trend up but remains to have good UOP. Had stable response to lasiz diuresis.  BUN elevated requiring multiple rounds of dialysis (10/15, 10/17, 10/20, 10/31). No significant improvements in kidney function noted.   In addition, pt H/H cont to fluctuate requiring multiple blood transfusions (10/14, 10/16, 10/18, 10/19, 10/22, 10/27, 10/29).  Pt reported dark stools 10/15 but color has normalized. FOBT+. EGD 10/18 consistent with ulcerated mucosa in duodenum s/p coagulation. Concern for H. Pylori. Started Amoxicillin/levofloxacin (10/19, complete 11/2)). Remains on Protonix 40 mg BID. Biopsy negative for infection and CMV. Will cont close monitoring H/H, transfuse as needed with goal Hb > 7.0. Of note, Urine cx + Klebsiella pneumoniae (10/13). Received 3 day course of ciprofloxacin, dc'd 10/19.   On 10/19 pm pt c/o crushing chest pain. Troponin 0.094, likely 2/2 ischemic stress. EKG NSR.   Liver US 11/6 showed no arterial flow. Paracentesis 11/7 with 8.9 L removed. Wbc 39, segs 16%. Gram stain no organisms seen + no WBC. Pt feels better since having fluid removed but she continues to have worsening abdominal distention & LE edema.Repeat Liver US 11/8 showed arterial flow with increased RIs. Paracentesis 11/12 with 8.6L removed (WBC 34, segs 25%). 11/15 liver u/s showed abnormally decreased main hepatic artery peak systolic velocity with slow arterial flow and abnormal tardus parvus waveforms intrahepatically. Consulted vascular surgery. Repeat liver US 11/19 showed no arterial flow on R and sluggish flow on L. Hepatic angiogram 11/19 confirmed HAT.  Friday 11/16, purulent drainage noted from middle of chevron incision. Area opened and packed. Cx grew klebsiella p. CT scan showed no signs of  infection. On Monday 11/18, it was noted that the right/lateral side of the chevron had 3 small openings draining purulent fluid. Opened the area and packed. Vac placed on 11/24. Wound care following. CT abd/pelvis 11/3 showed small volume abdominal ascites, improved compared to prior, pelvis ascites unchanged from prior, unchanged b/l pleural effusions (large on R, moderate on L) with bibasilar compressive atelectasis and consolidative changes. Patient finished day 7 of 7 of Ertapenem for kleb pneumo ESBL UTI 11/29. Of note, pt grew same bacteria from wound 11/16.    Interval history: No acute events overnight. TPN + octreotide started 12/3. Pt feeling better overall today. Drainage still chylous today, but with some improvement in color (a bit more yellow).  Will continue with TPN tonight. Ok for patient to eat clear liquid diet otherwise. Co2 elevated at 34. Will d/c lasix and reassess tomorrow. Transfused 2u PRBC yesterday w/ good response. Heparin shots causing some oozing, but no other signs of bleeding. Will monitor. Peritoneal fluid 11/29 w/ enterococcus. Per ID, treating with Vanc x 10 days and then repeat peritoneal fluid cx. Wound vac changed yesterday, w/ improvements noted in wound.  Liver US to assess recanalization post HAT yesterday -> conclusion that pt may recanalize. Will monitor closely with no further intervention planned at this time.    Scheduled Meds:   aspirin  81 mg Oral Daily    atovaquone  1,500 mg Oral Daily    entecavir  0.5 mg Oral Daily    heparin (porcine)  5,000 Units Subcutaneous Q8H    k phos di & mono-sod phos mono  2 tablet Oral BID    magnesium oxide  800 mg Oral BID    metoprolol tartrate  25 mg Oral BID    octreotide  50 mcg Subcutaneous Q8H    pantoprazole  40 mg Oral BID    predniSONE  2.5 mg Oral Daily    sodium chloride 0.9%  10 mL Intravenous Q6H    tacrolimus  2 mg Oral BID    vancomycin (VANCOCIN) IVPB  1,000 mg Intravenous Q24H     Continuous  Infusions:   insulin (HUMAN R) infusion (adults) 1.9 Units/hr (12/05/18 1434)    TPN ADULT CENTRAL LINE CUSTOM 50 mL/hr at 12/04/18 2027    TPN ADULT CENTRAL LINE CUSTOM       PRN Meds:sodium chloride, sodium chloride, sodium chloride, acetaminophen, bisacodyl, calcium carbonate, dextrose 50%, dextrose 50%, glucagon (human recombinant), glucose, glucose, insulin aspart U-100, omnipaque, ondansetron, ondansetron, polyethylene glycol, Flushing PICC Protocol **AND** sodium chloride 0.9% **AND** sodium chloride 0.9%, sodium chloride 0.9%, traMADol    Review of Systems   Constitutional: Positive for activity change. Negative for chills, fatigue and fever.   HENT: Negative for congestion, facial swelling and trouble swallowing.    Eyes: Negative for pain, discharge and visual disturbance.   Respiratory: Negative for cough, chest tightness, shortness of breath and wheezing.    Cardiovascular: Positive for leg swelling. Negative for chest pain and palpitations.   Gastrointestinal: Positive for abdominal distention and abdominal pain (improving). Negative for constipation, diarrhea, nausea and vomiting.   Genitourinary: Negative for decreased urine volume, difficulty urinating and dysuria.   Musculoskeletal: Positive for back pain.Negative for arthralgias and neck stiffness.   Skin: Positive for wound.   Allergic/Immunologic: Positive for immunocompromised state.   Neurological: Positive for weakness. Negative for tremors and headaches.   Psychiatric/Behavioral: Negative for confusion and agitation.     Objective:     Vital Signs (Most Recent):  Temp: 98.4 °F (36.9 °C) (12/05/18 1107)  Pulse: 85 (12/05/18 1200)  Resp: 18 (12/05/18 1200)  BP: 123/78 (12/05/18 1200)  SpO2: 97 % (12/05/18 1200) Vital Signs (24h Range):  Temp:  [98.3 °F (36.8 °C)-98.8 °F (37.1 °C)] 98.4 °F (36.9 °C)  Pulse:  [] 85  Resp:  [16-25] 18  SpO2:  [94 %-97 %] 97 %  BP: ()/(58-81) 123/78     Weight: 55.3 kg (121 lb 12.9 oz)  Body mass  index is 21.58 kg/m².    Intake/Output - Last 3 Shifts       12/03 0700 - 12/04 0659 12/04 0700 - 12/05 0659 12/05 0700 - 12/06 0659    P.O. 0 300     I.V. (mL/kg) 101.7 (1.7) 45.9 (0.8)     Blood  540     Other  0     IV Piggyback 250 250      1027.8     Total Intake(mL/kg) 756.7 (12.9) 2163.7 (39.2)     Urine (mL/kg/hr) 0 (0) 1150 (0.9)     Emesis/NG output 0 0     Drains 580 500 90    Other 0 0     Stool 0 0     Blood  0     Total Output 580 1650 90    Net +176.7 +513.7 -90           Urine Occurrence 3 x 3 x     Stool Occurrence 1 x 0 x     Emesis Occurrence 0 x 0 x           Physical Exam   Constitutional: She is oriented to person, place, and time. She appears well-developed. No distress.   Temporal and distal extremity muscle wasting   HENT:   Head: Normocephalic and atraumatic.   Eyes: EOM are normal. No scleral icterus.   Neck: Normal range of motion.   Cardiovascular: Normal rate, regular rhythm and normal heart sounds.   No murmur heard.  Pulmonary/Chest: Effort normal. No respiratory distress. She has no wheezes.   Breath sounds decreased at bases   Abdominal: Soft. Bowel sounds are normal. She exhibits distension. There is tenderness. There is no guarding.   Wound vac placed to middle chevron incision + R lateral side, no s/s/i  Erythema + induration RLQ, improving  RLQ VALORIE drain sites c/d/i with sutures   Musculoskeletal: Normal range of motion. She exhibits edema (2+ BLE, + generalized).   Neurological: She is alert and oriented to person, place, and time.   Skin: Skin is warm. She is not diaphoretic.   Psychiatric: She has a normal mood and affect.   Nursing note and vitals reviewed.      Laboratory:  Immunosuppressants         Stop Route Frequency     tacrolimus capsule 2 mg      -- Oral 2 times daily        CBC:   Recent Labs   Lab 12/05/18  1202   WBC 3.15*   RBC 3.68*   HGB 10.8*   HCT 33.1*   *   MCV 90   MCH 29.3   MCHC 32.6     CMP:   Recent Labs   Lab 12/05/18  1202   *    CALCIUM 8.9   ALBUMIN 3.1*   PROT 5.4*      K 4.1   CO2 34*   CL 95   BUN 49*   CREATININE 1.0   ALKPHOS 114   ALT 31   AST 37   BILITOT 2.0*     Labs within the past 24 hours have been reviewed.    Diagnostic Results:  I have personally reviewed all pertinent imaging studies.Scheduled Meds:   aspirin  81 mg Oral Daily    atovaquone  1,500 mg Oral Daily    entecavir  0.5 mg Oral Daily    heparin (porcine)  5,000 Units Subcutaneous Q8H    k phos di & mono-sod phos mono  2 tablet Oral BID    magnesium oxide  800 mg Oral BID    metoprolol tartrate  25 mg Oral BID    octreotide  50 mcg Subcutaneous Q8H    pantoprazole  40 mg Oral BID    predniSONE  2.5 mg Oral Daily    sodium chloride 0.9%  10 mL Intravenous Q6H    tacrolimus  2 mg Oral BID    vancomycin (VANCOCIN) IVPB  1,000 mg Intravenous Q24H     Continuous Infusions:   insulin (HUMAN R) infusion (adults) 1.9 Units/hr (12/05/18 1434)    TPN ADULT CENTRAL LINE CUSTOM 50 mL/hr at 12/04/18 2027    TPN ADULT CENTRAL LINE CUSTOM       PRN Meds:sodium chloride, sodium chloride, sodium chloride, acetaminophen, bisacodyl, calcium carbonate, dextrose 50%, dextrose 50%, glucagon (human recombinant), glucose, glucose, insulin aspart U-100, omnipaque, ondansetron, ondansetron, polyethylene glycol, Flushing PICC Protocol **AND** sodium chloride 0.9% **AND** sodium chloride 0.9%, sodium chloride 0.9%, traMADol    Review of Systems   Gastrointestinal: Abdominal pain: improving.     Objective:     Vital Signs (Most Recent):  Temp: 98.4 °F (36.9 °C) (12/05/18 1107)  Pulse: 85 (12/05/18 1200)  Resp: 18 (12/05/18 1200)  BP: 123/78 (12/05/18 1200)  SpO2: 97 % (12/05/18 1200) Vital Signs (24h Range):  Temp:  [98.3 °F (36.8 °C)-98.8 °F (37.1 °C)] 98.4 °F (36.9 °C)  Pulse:  [] 85  Resp:  [16-25] 18  SpO2:  [94 %-97 %] 97 %  BP: ()/(58-81) 123/78     Weight: 55.3 kg (121 lb 12.9 oz)  Body mass index is 21.58 kg/m².    Intake/Output - Last 3 Shifts        12/03 0700 - 12/04 0659 12/04 0700 - 12/05 0659 12/05 0700 - 12/06 0659    P.O. 0 300     I.V. (mL/kg) 101.7 (1.7) 45.9 (0.8)     Blood  540     Other  0     IV Piggyback 250 250      1027.8     Total Intake(mL/kg) 756.7 (12.9) 2163.7 (39.2)     Urine (mL/kg/hr) 0 (0) 1150 (0.9)     Emesis/NG output 0 0     Drains 580 500 90    Other 0 0     Stool 0 0     Blood  0     Total Output 580 1650 90    Net +176.7 +513.7 -90           Urine Occurrence 3 x 3 x     Stool Occurrence 1 x 0 x     Emesis Occurrence 0 x 0 x           Physical Exam      Laboratory:  Immunosuppressants         Stop Route Frequency     tacrolimus capsule 2 mg      -- Oral 2 times daily        CBC:   Recent Labs   Lab 12/05/18  1202   WBC 3.15*   RBC 3.68*   HGB 10.8*   HCT 33.1*   *   MCV 90   MCH 29.3   MCHC 32.6     CMP:   Recent Labs   Lab 12/05/18  1202   *   CALCIUM 8.9   ALBUMIN 3.1*   PROT 5.4*      K 4.1   CO2 34*   CL 95   BUN 49*   CREATININE 1.0   ALKPHOS 114   ALT 31   AST 37   BILITOT 2.0*     Coagulation: No results for input(s): PT, INR, APTT in the last 168 hours.  Labs within the past 24 hours have been reviewed.    Diagnostic Results:  I have personally reviewed all pertinent imaging studies.    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   - POD 1 US: increased arterial resistive indices, suggestive of edema vs acute rejection vs congestion.  - Repeat US 10/9 with continued elevation of RIs.  - LFTs stable.  - T bili trending down.   - Repeat liver US (10/11): elevated RIs.  - Liver US 11/6 to assess for ascites. No arterial flow within the liver allograft concerning for interval thrombosis/occlusion of the arterial system. Severe ascites seen.  - Liver US 11/8: arterial flow seen w/ elevated RIs   - LFTs remain stable. No CTA at this  "time due to EVA and normal liver function.   - Liver US 11/15 showed "abnormally decreased main hepatic artery peak systolic velocity with slow arterial flow and abnormal tardus parvus waveforms intrahepatically."   -Vascular surgery consulted  - Liver US 11/19 showed no arterial flow on the right and sluggish flow on the left.   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 No further intervention planned at this time as patient's LFTs are stable. Will monitor.  - CT abd/pelvis 11/26 showed: 1. Persistent large volume of ascites. 2. Large right and moderate left pleural effusions with adjacent basilar atelectasis. 3. Anasarca  - IR placed perc drain 11/29 due to suspicion for chyle leak. 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  - Fluid also growing enterococcus and will plan for treatment with Vanc x 10 days per ID recs. Will need repeat fluid cx post treatment.       Sequelae of hyperalimentation    - Monitor.       Tachycardia    - Pt w/ tachycardia but now improved.   - Metoprolol 12.5 mg BID for rate control. HR improved, but still elevated. BP stable.  - 11/30 -> increased Metoprolol to 25 mg BID   - Monitor closely.       Pleural effusion    - Common post liver transplant  - CT 11/26 showed: Large right and moderate left pleural effusions with adjacent basilar atelectasis.  - Pt sp02 stable on RA, but c/o some orthopnea.  - CT abd/pelvis 12/3 showed unchanged b/l pleural effusions (large on R, moderate on L) with bibasilar compressive atelectasis and consolidative changes.  - Sp02 stable & no signs of respiratory infection. Will continue to monitor.       Chronic kidney disease, stage III (moderate)    - Kidney fx improving  - Monitor       Hypomagnesemia    - Continue PO Mag Oxide  - Continue to monitor.       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   - " "Continue to monitor.     Hypophosphatemia    - Continue k phos and monitor closely.       Delayed surgical wound healing    - Middle chevron incision opened 11/16 with purulent drainage  - Packed with Aquacel ag at this time. Growing Klebsiella p.   - CT abd/pelvis 11/17- no signs of infection  - 11/17: R side of chevron incision w/ 3 small holes draining purulent fluid, opened and packed w/ aquacel.   - Wound care following.  - Wound w/ increased drainage, so wound care placed vac 11/24/18.  Apprec recs. Extending Ertapenem thru 11/28 per ID.    - Abdomen erythematous and indurated RLQ inferior to chevron.  - CT abd/pelvis 11/26 - no sign of infection  - Last wound vac change 12/4 with improvements noted in wound.    .       Hepatic artery thrombosis of transplanted liver    - Liver US 11/19 showed no arterial flow to R and sluggish flow to L  - Hepatic angiogram 11/19 confirmed HAT  - Liver US 12/4 to evaluate for recanalization reviewed. Recanalization possible. Will continue to monitor.  - No further intervention planned at this time. Will continue to monitor.     Other drug-induced pancytopenia    - Decreased plt, wbc, h/h  - See "leukopenia"  - Continue to monitor.     Thrombocytopenia, unspecified    - Monitor.       Alteration in skin integrity    - Monitor.       Leukopenia    - Stopped Cellcept, Bactrim, and Valcyte 10/31   - Cellcept half dose resumed 11/6  - Started Atovaquone 11/6.   - CMV PCR noted to be positive at 129. Will hold off on treatment with valcyte at this time. Monitoring weekly CMV PCRs.  - Cellcept held 11/23 2/2 infection (UTI + wound)  - Continue to monitor.       Azotemia    - CRRT 10/31 for uremic toxin clearance.           UTI (urinary tract infection)    - Urine Cx 10/15 w/ Klebsiella pneumonia.  - Started Ciprofloxacin (10/17-10/19).   - Started on amoxicillin/levo for H. Pylori (completed abx 11/2).  - Pt c/o lower back pain 11/22. UA w/ trace leuks, moderate wbs, 1+ blood, cx " "pending  - Pt symptomatic (back pain) & with ESBL in wound previously.  Ertapenem 1g QD x 3 days (started 11/23), per ID recs.    - Given pt cont to have back pain and concern for infection in wound ( thick drainage and increased drainage)-  extended Ertapenem for 4 more days per ID (stop date 11/29/18).         Acute blood loss anemia    - Expected post transplant  - H&H cont to fluctuate.   - FOBT +.  - EGD 10/18 - showed diffuse bleeding with ulcerated duodenal mucosa.  - EGD 11/5 - improvement seen, no active bleeding.  - Resume heparin sq injections 11/6.   - Decrease heparin sq from TID to BID 11/15   - Heparin dc'd 11/17 then resumed a few days later 2/2 risk vs. benefit  - See "anemia requiring transfusions."  - Continue to monitor.             Post LT - Acute renal failure    - Creatinine on arrival 0.8 and trended up.  - Nephrology consulted.  - Renal US (10/11): no hydronephrosis.  - Echo 10/12: diastolic dysfunction, likely 2/2 to ARF.  - CRRT 10/14; SLED 10/17, 10/20. --> no improvements noted in kidney fxn.  - 24 hr urine creatine collection: CrCl of 7.   - CRRT 10/31 with 1L removed.   - Cr level now with improvement.   - See "hypervolemia associated with renal insufficiency"     Hypervolemia associated with renal insufficiency    - CRRT 10/14, 10/17, 10/20, 10/31.  - CXR 10/30: abundant fluid in b/l pleural space.  - Renal insufficiency resolved. Cr stable. Co2 elevated. D/c'd Lasix 12/5.  - Monitor daily weights.       EVA (acute kidney injury)    - No plans for RRT in near future, per nephrology  - Trialysis line removed 11/13  - Nephrology involved. Appreciate recs.  - Cr improved & stable.  - Continue to monitor.     At risk for opportunistic infections    - Valcyte for CMV prophylaxis--> holding (10/31) for leukopenia. Pt will need weekly CMV PCR while discontinued.  - CMV PCR noted to be positive at 129. .  - CMV PCR 11/8: 105  - CMV PCR 11/15 with 89 copies  - CMV PCR 11/23 95 - increased from " previous. Continue to hold Valcyte & monitor pt closely.  - CMV PCR 11/29 146 - per ID, will continue to monitor w/ weekly PCRs.  - Bactrim for PCP prophylaxis ([facility]).--> holding (10/31) for leukopenia.   - Start Atovaquone 11/6.  - Fluconazole for fungal prophylaxis (first dose 10/6). D/c Fluc 11/16 bc pt had been on it for > 30 days post take back.       Long-term use of immunosuppressant medication    - Prograf: stopped 10/29. Resumed 11/2.  --> Will monitor for signs of toxic side effects, check daily troughs, and change meds accordingly.   - Prednisone: stopped 10/29. Started on Hydrocortisone. pred taper resumed.  - Cellcept: stopped 10/31 for leukopenia. Restarted Cellcept 500 mg bid, 11/6.  - Cellcept held 11/23 2/2 infection         Prophylactic immunotherapy    - See long term use immunosuppresion.       Chylous ascites    - Paracentesis 10/1, 5L removed, no fluid sent for analysis.  - Pt remains volume overloaded.   - Liver US 11/6 with severe ascites.   - Paracentesis 11/7 with 8.9 L removed. Wbc 39, segs 16%. Gram stain - no organisms seen and no WBC. Cultures NGTD.  - Paracentesis 11/12: 8.6 L removed. 34 wbc, 25% segs, 33 lymphs, 42 monocytes/macrophages. Cultures NGTD  - Paracentesis 11/21 with 4 L removed, neg for infection.  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 Milky appearance, cell count of fluid (51% lymphs), triglycerides 2208, consistent with chyle leak.   - Peritoneal fluid 11/29 growing enterococcus and will plan for treatment with Vanc x 10 days (12/3-12/13) per ID recs.   - Attempted low fat, high protein diet due to suspicion for chyle leak with no improvement seen in fluid character.  - CT abd/pelvis 12/3 shows small volume abdominal ascites, improved compared to prior, pelvis ascites unchanged from prior, unchanged b/l pleural effusions (large on R, moderate on L) with bibasilar compressive atelectasis and consolidative changes.  - Placed PICC line and  started TPN + octreotide 12/3. Some improvement in color of fluid seen 12/5. Will continue with TPN, but allow pt to have clear liquids.  - Continue to monitor.     Weakness    - See physical deconditioning.       Physical deconditioning    - PT/OT consulted.  Progressing well w/ therapy.  - Recommend home health PT and rolling walker at discharge (orders placed 11/6).       Hypotension    - d/c'd midodrine 11/28 as patient has not required it recently  - Continue to monitor.     Anemia requiring transfusions    - Fluctuating H&H.    - Transfusions: 10/14; 10/16; 10/18; 10/19; 10/22; 10/27; 10/29, 10/31, 11/3, 11/21, 11/29, 12/4  - FOBT +  - LDH elevated, Hapto < 10.  - EGD 10/18: ulcerated duodenal mucosa.   - Consulted GI about this ongoing issue and further need of another EGD.  - EGD 11/5 unremarkable.   - Iron studies 11/13:  95, TIBC 107, sat iron 89, transferrin 72, ferritin pending  - Hemolytic anemia workup 11/13: Haptoglobin 132, retic 3.9   - H/H dropped 12/4 - transfused 2u PRBC with good response  - No overt signs of bleeding.  - Fecal occult blood 12/4 pending stool sample  - Continue to monitor.           Severe malnutrition    - Dietary consulted.   - Temporal and distal extremity muscle wasting and edema.  - Encourage supplements and to increase nutritional intake.  - Per nephrology, pt to be on low protein diet.  - Prealbumin reviewed.   - Tolerating diet.  Denies n/v.    - Attempted low fat, high protein diet due to suspicion for chyle leak with no improvement seen in fluid character.  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 Clear liquid diet approved by surgeon (in addition to) TPN 12/5  - Monitor.         Type 2 diabetes mellitus with diabetic polyneuropathy, with long-term current use of insulin    - Continue home regimen.  - TPN started 12/3 and pt now on insulin drip.  - Endocrine following. Appreciate recs.       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  - Tx w/ Entecavir- dose changed to daily 11/26 due to improved renal fx  - 12/3 Hep B surface antigen negative 12/3, surface AB quant 571, and Hep B DNA <10.  - Continue to monitor, per protocol.         VTE Risk Mitigation (From admission, onward)        Ordered     heparin (porcine) injection 5,000 Units  Every 8 hours      11/22/18 1014     IP VTE HIGH RISK PATIENT  Once      11/05/18 1145     Place sequential compression device  Until discontinued      10/05/18 0709          The patients clinical status was discussed at multidisplinary rounds, involving transplant surgery, transplant medicine, pharmacy, nursing, nutrition, and social work    Discharge Planning:  No Patient Care Coordination Note on file.      Elizabeth Dean PA-C  Liver Transplant  [facility]

## 2018-12-05 NOTE — PLAN OF CARE
Problem: Patient Care Overview  Goal: Plan of Care Review  Outcome: Ongoing (interventions implemented as appropriate)  AOx4, VSS, denies pain. Accuchecks Q2 - Insulin gtt; titrating per orders. BCG ranged from 165-226. TPN infusing @ 50cc/hr. Completed 2/2 units of RBC this shift. Repeat labs @ 0600.  Emptied VALORIE Q4 - drainage continues to be milky/pink tinged. Output ranged from 50-80. Wound vac w/ no output this shift. Wound dressing remains CDI. Remains free from falls. Bed remains locked and lowered. Personal items and call light w/in reach.  present at bedside. NAD, WCNAN.

## 2018-12-05 NOTE — PROGRESS NOTES
"[facility]  Endocrinology  Progress Note    Admit Date: 10/1/2018     Reason for Consult: Management of type 2 DM, Hyperglycemia      Surgical Procedure and Date: Liver transplant 10/5/18, Ex lap 10/6/18     Diabetes diagnosis year: 2013     Home Diabetes Medications:  Lantus 18 units HS and novolog 17 units with meals plus correction scale (150-200 +1)        Lab Results   Component Value Date     HGBA1C 6.8 (H) 08/16/2018        How often checking glucose at home? 3-4   BG readings on regimen: 120-150.  Post prandial readings as high as upper 200s  Hypoglycemia on the regimen? yes, none recently   Missed doses on regimen?  No     Diabetes Complications include:     Diabetic peripheral neuropathy      Complicating diabetes co morbidities:   CIRRHOSIS        HPI:  Patient is a 68 y.o. female with a diagnosis of ESLD, listed for liver transplant with meld 23 who underwent live transplant on 10/5/18.  Back to OR on 10/6/18 for ex lap.  Admitted 10/1/18 for hyponatremia s/p paracentesis.  Personal has known diagnosis of diabetes, endocrine consulted for diabetes management.    Post-operative course complicated by ESBL Klebsiella pneumoniae in urine (in contact isolation), peritransplant ascites, worsening renal function (required temporary dialysis), anemia (multiple blood transfusions), and possible GI bleed.     Interval HPI:   Overnight events: No acute events overnight. BG range from 165-226 on IIP 1.6-2.5 u/hr. Remains in TSU. Contact precautions in place. Wound vac.   Eating:   NPO  Nausea: No  Hypoglycemia and intervention: No  Fever: No  TPN and/or TF: Yes: Dextrose 70%, k 60meq, 1190kcal  If yes, type of TF/TPN and rate: 50 ml/hr    /78   Pulse 85   Temp 98.3 °F (36.8 °C)   Resp 18   Ht 5' 3" (1.6 m)   Wt 55.3 kg (121 lb 12.9 oz)   LMP  (LMP Unknown)   SpO2 97%   Breastfeeding? No   BMI 21.58 kg/m²      Labs Reviewed and Include    Recent Labs   Lab 12/05/18  1202   * "   CALCIUM 8.9   ALBUMIN 3.1*   PROT 5.4*      K 4.1   CO2 34*   CL 95   BUN 49*   CREATININE 1.0   ALKPHOS 114   ALT 31   AST 37   BILITOT 2.0*     Lab Results   Component Value Date    WBC 3.15 (L) 12/05/2018    HGB 10.8 (L) 12/05/2018    HCT 33.1 (L) 12/05/2018    MCV 90 12/05/2018     (L) 12/05/2018     No results for input(s): TSH, FREET4 in the last 168 hours.  Lab Results   Component Value Date    HGBA1C 6.0 (H) 10/04/2018       Nutritional status:   Body mass index is 21.58 kg/m².  Lab Results   Component Value Date    ALBUMIN 3.1 (L) 12/05/2018    ALBUMIN 3.0 (L) 12/04/2018    ALBUMIN 2.7 (L) 12/03/2018     Lab Results   Component Value Date    PREALBUMIN 15 (L) 11/03/2018    PREALBUMIN 12 (L) 09/17/2018       Estimated Creatinine Clearance: 43.9 mL/min (based on SCr of 1 mg/dL).    Accu-Checks  Recent Labs     12/04/18  1750 12/04/18  2012 12/04/18  2225 12/05/18  0016 12/05/18  0234 12/05/18  0418 12/05/18  0601 12/05/18  0802 12/05/18  1003 12/05/18  1356   POCTGLUCOSE 201* 182* 226* 213* 165* 171* 202* 199* 228* 282*       Current Medications and/or Treatments Impacting Glycemic Control  Immunotherapy:    Immunosuppressants         Stop Route Frequency     tacrolimus capsule 2 mg      -- Oral 2 times daily        Steroids:   Hormones (From admission, onward)    Start     Stop Route Frequency Ordered    12/03/18 1400  octreotide injection 50 mcg      -- SubQ Every 8 hours 12/03/18 1116    12/01/18 0900  predniSONE tablet 2.5 mg      12/08 0859 Oral Daily 11/05/18 1004    10/17/18 0945  predniSONE tablet 15 mg      10/24 0859 Oral Daily 10/17/18 0943        Pressors:    Autonomic Drugs (From admission, onward)    Start     Stop Route Frequency Ordered    10/06/18 1617  rocuronium (ZEMURON) 10 mg/mL injection     Comments:  Created by cabinet override    10/07 0429   10/06/18 9628        Hyperglycemia/Diabetes Medications:   Antihyperglycemics (From admission, onward)    Start     Stop Route  Frequency Ordered    12/05/18 1115  insulin regular (Humulin R) 100 Units in sodium chloride 0.9% 100 mL infusion      -- IV Continuous 12/05/18 1012    12/05/18 1111  insulin aspart U-100 pen 0-4 Units      -- SubQ As needed (PRN) 12/05/18 1012    10/07/18 1200  insulin regular (Humulin R) 100 Units in sodium chloride 0.9% 100 mL infusion      10/13 2100 IV Continuous 10/07/18 1055          ASSESSMENT and PLAN    * Liver transplanted    Managed per primary team  Avoid hypoglycemia    Recent Labs   Lab 12/05/18  1202   ALT 31   AST 37   ALKPHOS 114   BILITOT 2.0*   PROT 5.4*   ALBUMIN 3.1*            Type 2 diabetes mellitus with diabetic polyneuropathy, with long-term current use of insulin    BG goal 140-180    Change IV insulin infusion protocol to Transition drip: Rate of 1.9 with Low SQCorrection scale.  change to q4 hr given finger sticks      Discharge planning:  TBD     Chronic kidney disease, stage III (moderate)      Managed per primary team  Avoid hypoglycemia. Caution with insulin stacking       Hypokalemia      Managed per primary team  Avoid hypoglycemia  Lab Results   Component Value Date    K 4.1 12/05/2018     Lab Results   Component Value Date    BUN 49 (H) 12/05/2018     Lab Results   Component Value Date    CREATININE 1.0 12/05/2018          Prophylactic immunotherapy    May increase insulin resistance.          EVA (acute kidney injury)    Avoid insulin stacking and hypoglycemia.  Lab Results   Component Value Date    CREATININE 1.0 12/05/2018          Adrenal cortical steroids causing adverse effect in therapeutic use    On steroid taper per transplant team; may elevate BG readings             Louie Merlos NP  Endocrinology  [facility]

## 2018-12-05 NOTE — ASSESSMENT & PLAN NOTE
- Fluctuating H&H.    - Transfusions: 10/14; 10/16; 10/18; 10/19; 10/22; 10/27; 10/29, 10/31, 11/3, 11/21, 11/29, 12/4  - FOBT +  - LDH elevated, Hapto < 10.  - EGD 10/18: ulcerated duodenal mucosa.   - Consulted GI about this ongoing issue and further need of another EGD.  - EGD 11/5 unremarkable.   - Iron studies 11/13:  95, TIBC 107, sat iron 89, transferrin 72, ferritin pending  - Hemolytic anemia workup 11/13: Haptoglobin 132, retic 3.9   - H/H dropped 12/4 - transfused 2u PRBC with good response  - No overt signs of bleeding.  - Fecal occult blood 12/4 pending stool sample  - Continue to monitor.

## 2018-12-05 NOTE — PLAN OF CARE
Problem: Physical Therapy Goal  Goal: Physical Therapy Goal  Goals to be met by: 2018    Patient will increase functional independence with mobility by performin. Supine to sit with Contact Guard Assistance - not met  2. Sit to stand transfer with Supervision using LRAD or no AD - not met  3. Bed to chair transfer with Supervision using LRAD or no AD - not met  4. Gait  x 250 feet with Supervision using LRAD or no AD - not met  5. Stand for 3 minutes with Supervision to increase activity tolerance - not met   6. Lower extremity exercise program x15 reps per handout, with independence - not met            Outcome: Ongoing (interventions implemented as appropriate)  Progressing well towards goals    Marcelino Bird DPT  2018

## 2018-12-05 NOTE — PLAN OF CARE
Problem: Patient Care Overview  Goal: Plan of Care Review  Outcome: Ongoing (interventions implemented as appropriate)  VSS. Pt placed on transition insulin running @ 1.9 ml/hr. Pt was switched to a clear liquid today. No BM today. Stool sample still pending. No issues. WCTM.

## 2018-12-05 NOTE — PROGRESS NOTES
"[facility]  Endocrinology  Progress Note    Admit Date: 10/1/2018     Reason for Consult: Management of type 2 DM, Hyperglycemia      Surgical Procedure and Date: Liver transplant 10/5/18, Ex lap 10/6/18     Diabetes diagnosis year: 2013     Home Diabetes Medications:  Lantus 18 units HS and novolog 17 units with meals plus correction scale (150-200 +1)        Lab Results   Component Value Date     HGBA1C 6.8 (H) 08/16/2018        How often checking glucose at home? 3-4   BG readings on regimen: 120-150.  Post prandial readings as high as upper 200s  Hypoglycemia on the regimen? yes, none recently   Missed doses on regimen?  No     Diabetes Complications include:     Diabetic peripheral neuropathy      Complicating diabetes co morbidities:   CIRRHOSIS        HPI:  Patient is a 68 y.o. female with a diagnosis of ESLD, listed for liver transplant with meld 23 who underwent live transplant on 10/5/18.  Back to OR on 10/6/18 for ex lap.  Admitted 10/1/18 for hyponatremia s/p paracentesis.  Personal has known diagnosis of diabetes, endocrine consulted for diabetes management.    Post-operative course complicated by ESBL Klebsiella pneumoniae in urine (in contact isolation), peritransplant ascites, worsening renal function (required temporary dialysis), anemia (multiple blood transfusions), and possible GI bleed.     Interval HPI:   Overnight events: overnight BG range from 165-226  Eating:   NPO  Nausea: No  Hypoglycemia and intervention: No  Fever: No  TPN : Yes: Dextrose 70%, k 60meq, 1190kcal  If yes,TPN and rate: 50 ml/hr    /78   Pulse 85   Temp 98.3 °F (36.8 °C)   Resp 18   Ht 5' 3" (1.6 m)   Wt 55.3 kg (121 lb 12.9 oz)   LMP  (LMP Unknown)   SpO2 97%   Breastfeeding? No   BMI 21.58 kg/m²      Labs Reviewed and Include    Recent Labs   Lab 12/05/18  1202   *   CALCIUM 8.9   ALBUMIN 3.1*   PROT 5.4*      K 4.1   CO2 34*   CL 95   BUN 49*   CREATININE 1.0   ALKPHOS 114 "   ALT 31   AST 37   BILITOT 2.0*     Lab Results   Component Value Date    WBC 3.15 (L) 12/05/2018    HGB 10.8 (L) 12/05/2018    HCT 33.1 (L) 12/05/2018    MCV 90 12/05/2018     (L) 12/05/2018     No results for input(s): TSH, FREET4 in the last 168 hours.  Lab Results   Component Value Date    HGBA1C 6.0 (H) 10/04/2018       Nutritional status:   Body mass index is 21.58 kg/m².  Lab Results   Component Value Date    ALBUMIN 3.1 (L) 12/05/2018    ALBUMIN 3.0 (L) 12/04/2018    ALBUMIN 2.7 (L) 12/03/2018     Lab Results   Component Value Date    PREALBUMIN 15 (L) 11/03/2018    PREALBUMIN 12 (L) 09/17/2018       Estimated Creatinine Clearance: 43.9 mL/min (based on SCr of 1 mg/dL).    Accu-Checks  Recent Labs     12/04/18  1750 12/04/18  2012 12/04/18  2225 12/05/18  0016 12/05/18  0234 12/05/18  0418 12/05/18  0601 12/05/18  0802 12/05/18  1003 12/05/18  1356   POCTGLUCOSE 201* 182* 226* 213* 165* 171* 202* 199* 228* 282*       Current Medications and/or Treatments Impacting Glycemic Control  Immunotherapy:    Immunosuppressants         Stop Route Frequency     tacrolimus capsule 2 mg      -- Oral 2 times daily        Steroids:   Hormones (From admission, onward)    Start     Stop Route Frequency Ordered    12/03/18 1400  octreotide injection 50 mcg      -- SubQ Every 8 hours 12/03/18 1116    12/01/18 0900  predniSONE tablet 2.5 mg      12/08 0859 Oral Daily 11/05/18 1004    10/17/18 0945  predniSONE tablet 15 mg      10/24 0859 Oral Daily 10/17/18 0943        Pressors:    Autonomic Drugs (From admission, onward)    Start     Stop Route Frequency Ordered    10/06/18 1617  rocuronium (ZEMURON) 10 mg/mL injection     Comments:  Created by cabinet override    10/07 0429   10/06/18 1617        Hyperglycemia/Diabetes Medications:   Antihyperglycemics (From admission, onward)    Start     Stop Route Frequency Ordered    12/05/18 1115  insulin regular (Humulin R) 100 Units in sodium chloride 0.9% 100 mL infusion       -- IV Continuous 12/05/18 1012    12/05/18 1111  insulin aspart U-100 pen 0-4 Units      -- SubQ As needed (PRN) 12/05/18 1012    10/07/18 1200  insulin regular (Humulin R) 100 Units in sodium chloride 0.9% 100 mL infusion      10/13 2100 IV Continuous 10/07/18 1055          ASSESSMENT and PLAN    * Liver transplanted    Managed per primary team  Avoid hypoglycemia    Recent Labs   Lab 12/05/18  1202   ALT 31   AST 37   ALKPHOS 114   BILITOT 2.0*   PROT 5.4*   ALBUMIN 3.1*            Type 2 diabetes mellitus with diabetic polyneuropathy, with long-term current use of insulin    BG goal 140-180    Change IV insulin infusion protocol to Transition drip: Rate of 1.9 with Low SQCorrection scale.  change to q4 hr given finger sticks      Discharge planning:  MALCOLM Merlos, NP  Endocrinology  [facility]

## 2018-12-05 NOTE — ASSESSMENT & PLAN NOTE
Avoid insulin stacking and hypoglycemia.  Lab Results   Component Value Date    CREATININE 1.0 12/05/2018

## 2018-12-05 NOTE — ASSESSMENT & PLAN NOTE
- Valcyte for CMV prophylaxis--> holding (10/31) for leukopenia. Pt will need weekly CMV PCR while discontinued.  - CMV PCR noted to be positive at 129. .  - CMV PCR 11/8: 105  - CMV PCR 11/15 with 89 copies  - CMV PCR 11/23 95 - increased from previous. Continue to hold Valcyte & monitor pt closely.  - CMV PCR 11/29 146 - per ID, will continue to monitor w/ weekly PCRs.  - Bactrim for PCP prophylaxis ([facility]).--> holding (10/31) for leukopenia.   - Start Atovaquone 11/6.  - Fluconazole for fungal prophylaxis (first dose 10/6). D/c Fluc 11/16 bc pt had been on it for > 30 days post take back.

## 2018-12-05 NOTE — ASSESSMENT & PLAN NOTE
- Dietary consulted.   - Temporal and distal extremity muscle wasting and edema.  - Encourage supplements and to increase nutritional intake.  - Per nephrology, pt to be on low protein diet.  - Prealbumin reviewed.   - Tolerating diet.  Denies n/v.    - Attempted low fat, high protein diet due to suspicion for chyle leak with no improvement seen in fluid character.  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 Clear liquid diet approved by surgeon (in addition to) TPN 12/5  - Monitor.

## 2018-12-05 NOTE — ASSESSMENT & PLAN NOTE
- CRRT 10/14, 10/17, 10/20, 10/31.  - CXR 10/30: abundant fluid in b/l pleural space.  - Renal insufficiency resolved. Cr stable. Co2 elevated. D/c'd Lasix 12/5.  - Monitor daily weights.

## 2018-12-05 NOTE — ASSESSMENT & PLAN NOTE
Managed per primary team  Avoid hypoglycemia    Recent Labs   Lab 12/05/18  1202   ALT 31   AST 37   ALKPHOS 114   BILITOT 2.0*   PROT 5.4*   ALBUMIN 3.1*

## 2018-12-06 PROBLEM — E88.09 HYPOALBUMINEMIA: Status: ACTIVE | Noted: 2018-12-06

## 2018-12-06 LAB
ALBUMIN SERPL BCP-MCNC: 2.8 G/DL
ALP SERPL-CCNC: 119 U/L
ALT SERPL W/O P-5'-P-CCNC: 28 U/L
ANION GAP SERPL CALC-SCNC: 8 MMOL/L
AST SERPL-CCNC: 31 U/L
BASOPHILS # BLD AUTO: 0.02 K/UL
BASOPHILS NFR BLD: 0.7 %
BILIRUB SERPL-MCNC: 1.7 MG/DL
BUN SERPL-MCNC: 45 MG/DL
CALCIUM SERPL-MCNC: 8.8 MG/DL
CHLORIDE SERPL-SCNC: 98 MMOL/L
CO2 SERPL-SCNC: 34 MMOL/L
CREAT SERPL-MCNC: 0.9 MG/DL
DIFFERENTIAL METHOD: ABNORMAL
EOSINOPHIL # BLD AUTO: 0.1 K/UL
EOSINOPHIL NFR BLD: 2.4 %
ERYTHROCYTE [DISTWIDTH] IN BLOOD BY AUTOMATED COUNT: 15.6 %
EST. GFR  (AFRICAN AMERICAN): >60 ML/MIN/1.73 M^2
EST. GFR  (NON AFRICAN AMERICAN): >60 ML/MIN/1.73 M^2
GLUCOSE SERPL-MCNC: 172 MG/DL
HCT VFR BLD AUTO: 31.7 %
HGB BLD-MCNC: 10.1 G/DL
IMM GRANULOCYTES # BLD AUTO: 0.11 K/UL
IMM GRANULOCYTES NFR BLD AUTO: 3.8 %
LYMPHOCYTES # BLD AUTO: 0.4 K/UL
LYMPHOCYTES NFR BLD: 14.3 %
MAGNESIUM SERPL-MCNC: 1.9 MG/DL
MCH RBC QN AUTO: 28.6 PG
MCHC RBC AUTO-ENTMCNC: 31.9 G/DL
MCV RBC AUTO: 90 FL
MONOCYTES # BLD AUTO: 0.5 K/UL
MONOCYTES NFR BLD: 17.1 %
NEUTROPHILS # BLD AUTO: 1.8 K/UL
NEUTROPHILS NFR BLD: 61.7 %
NRBC BLD-RTO: 0 /100 WBC
PHOSPHATE SERPL-MCNC: 2.5 MG/DL
PLATELET # BLD AUTO: 101 K/UL
PMV BLD AUTO: 9.9 FL
POCT GLUCOSE: 165 MG/DL (ref 70–110)
POCT GLUCOSE: 194 MG/DL (ref 70–110)
POCT GLUCOSE: 208 MG/DL (ref 70–110)
POCT GLUCOSE: 210 MG/DL (ref 70–110)
POCT GLUCOSE: 225 MG/DL (ref 70–110)
POCT GLUCOSE: 275 MG/DL (ref 70–110)
POTASSIUM SERPL-SCNC: 3.8 MMOL/L
PROT SERPL-MCNC: 5 G/DL
RBC # BLD AUTO: 3.53 M/UL
SODIUM SERPL-SCNC: 140 MMOL/L
TACROLIMUS BLD-MCNC: 8.5 NG/ML
VANCOMYCIN TROUGH SERPL-MCNC: 22.4 UG/ML
WBC # BLD AUTO: 2.93 K/UL

## 2018-12-06 PROCEDURE — P9047 ALBUMIN (HUMAN), 25%, 50ML: HCPCS | Mod: JG | Performed by: PHYSICIAN ASSISTANT

## 2018-12-06 PROCEDURE — 63600175 PHARM REV CODE 636 W HCPCS: Mod: JG | Performed by: PHYSICIAN ASSISTANT

## 2018-12-06 PROCEDURE — A4216 STERILE WATER/SALINE, 10 ML: HCPCS | Performed by: TRANSPLANT SURGERY

## 2018-12-06 PROCEDURE — 99232 SBSQ HOSP IP/OBS MODERATE 35: CPT | Mod: ,,, | Performed by: NURSE PRACTITIONER

## 2018-12-06 PROCEDURE — 27000646 HC AEROBIKA DEVICE

## 2018-12-06 PROCEDURE — 25000003 PHARM REV CODE 250: Performed by: TRANSPLANT SURGERY

## 2018-12-06 PROCEDURE — 25000003 PHARM REV CODE 250: Performed by: PHYSICIAN ASSISTANT

## 2018-12-06 PROCEDURE — 25000003 PHARM REV CODE 250: Performed by: NURSE PRACTITIONER

## 2018-12-06 PROCEDURE — 63600175 PHARM REV CODE 636 W HCPCS: Performed by: PHYSICIAN ASSISTANT

## 2018-12-06 PROCEDURE — 99233 SBSQ HOSP IP/OBS HIGH 50: CPT | Mod: 24,,, | Performed by: PHYSICIAN ASSISTANT

## 2018-12-06 PROCEDURE — A4217 STERILE WATER/SALINE, 500 ML: HCPCS | Performed by: PHYSICIAN ASSISTANT

## 2018-12-06 PROCEDURE — 83735 ASSAY OF MAGNESIUM: CPT

## 2018-12-06 PROCEDURE — 80202 ASSAY OF VANCOMYCIN: CPT

## 2018-12-06 PROCEDURE — 20600001 HC STEP DOWN PRIVATE ROOM

## 2018-12-06 PROCEDURE — 99900035 HC TECH TIME PER 15 MIN (STAT)

## 2018-12-06 PROCEDURE — 97110 THERAPEUTIC EXERCISES: CPT

## 2018-12-06 PROCEDURE — 94664 DEMO&/EVAL PT USE INHALER: CPT

## 2018-12-06 PROCEDURE — 80197 ASSAY OF TACROLIMUS: CPT

## 2018-12-06 PROCEDURE — 63600175 PHARM REV CODE 636 W HCPCS: Performed by: NURSE PRACTITIONER

## 2018-12-06 PROCEDURE — 85025 COMPLETE CBC W/AUTO DIFF WBC: CPT

## 2018-12-06 PROCEDURE — 84100 ASSAY OF PHOSPHORUS: CPT

## 2018-12-06 PROCEDURE — 80053 COMPREHEN METABOLIC PANEL: CPT

## 2018-12-06 RX ORDER — ALBUMIN HUMAN 250 G/1000ML
25 SOLUTION INTRAVENOUS ONCE
Status: COMPLETED | OUTPATIENT
Start: 2018-12-06 | End: 2018-12-06

## 2018-12-06 RX ORDER — HEPARIN SODIUM 5000 [USP'U]/ML
5000 INJECTION, SOLUTION INTRAVENOUS; SUBCUTANEOUS EVERY 12 HOURS
Status: DISCONTINUED | OUTPATIENT
Start: 2018-12-06 | End: 2018-12-19

## 2018-12-06 RX ADMIN — PANTOPRAZOLE SODIUM 40 MG: 40 TABLET, DELAYED RELEASE ORAL at 08:12

## 2018-12-06 RX ADMIN — PREDNISONE 2.5 MG: 2.5 TABLET ORAL at 08:12

## 2018-12-06 RX ADMIN — MAGNESIUM OXIDE TAB 400 MG (241.3 MG ELEMENTAL MG) 800 MG: 400 (241.3 MG) TAB at 08:12

## 2018-12-06 RX ADMIN — HEPARIN SODIUM 5000 UNITS: 5000 INJECTION, SOLUTION INTRAVENOUS; SUBCUTANEOUS at 06:12

## 2018-12-06 RX ADMIN — HEPARIN SODIUM 5000 UNITS: 5000 INJECTION, SOLUTION INTRAVENOUS; SUBCUTANEOUS at 08:12

## 2018-12-06 RX ADMIN — Medication 10 ML: at 12:12

## 2018-12-06 RX ADMIN — Medication 10 ML: at 06:12

## 2018-12-06 RX ADMIN — OCTREOTIDE ACETATE 50 MCG: 100 INJECTION, SOLUTION INTRAVENOUS; SUBCUTANEOUS at 06:12

## 2018-12-06 RX ADMIN — ALBUMIN HUMAN 25 G: 0.25 SOLUTION INTRAVENOUS at 11:12

## 2018-12-06 RX ADMIN — DIBASIC SODIUM PHOSPHATE, MONOBASIC POTASSIUM PHOSPHATE AND MONOBASIC SODIUM PHOSPHATE 2 TABLET: 852; 155; 130 TABLET ORAL at 08:12

## 2018-12-06 RX ADMIN — METOPROLOL TARTRATE 25 MG: 25 TABLET ORAL at 08:12

## 2018-12-06 RX ADMIN — OCTREOTIDE ACETATE 50 MCG: 100 INJECTION, SOLUTION INTRAVENOUS; SUBCUTANEOUS at 02:12

## 2018-12-06 RX ADMIN — INSULIN ASPART 1 UNITS: 100 INJECTION, SOLUTION INTRAVENOUS; SUBCUTANEOUS at 10:12

## 2018-12-06 RX ADMIN — ASPIRIN 81 MG CHEWABLE TABLET 81 MG: 81 TABLET CHEWABLE at 08:12

## 2018-12-06 RX ADMIN — TACROLIMUS 2 MG: 1 CAPSULE ORAL at 08:12

## 2018-12-06 RX ADMIN — INSULIN ASPART 1 UNITS: 100 INJECTION, SOLUTION INTRAVENOUS; SUBCUTANEOUS at 02:12

## 2018-12-06 RX ADMIN — SODIUM CHLORIDE 1.9 UNITS/HR: 9 INJECTION, SOLUTION INTRAVENOUS at 08:12

## 2018-12-06 RX ADMIN — OCTREOTIDE ACETATE 50 MCG: 100 INJECTION, SOLUTION INTRAVENOUS; SUBCUTANEOUS at 08:12

## 2018-12-06 RX ADMIN — ATOVAQUONE 1500 MG: 750 SUSPENSION ORAL at 08:12

## 2018-12-06 RX ADMIN — TACROLIMUS 2 MG: 1 CAPSULE ORAL at 05:12

## 2018-12-06 RX ADMIN — INSULIN ASPART 1 UNITS: 100 INJECTION, SOLUTION INTRAVENOUS; SUBCUTANEOUS at 09:12

## 2018-12-06 RX ADMIN — INSULIN ASPART 1 UNITS: 100 INJECTION, SOLUTION INTRAVENOUS; SUBCUTANEOUS at 05:12

## 2018-12-06 RX ADMIN — TRAMADOL HYDROCHLORIDE 50 MG: 50 TABLET, FILM COATED ORAL at 09:12

## 2018-12-06 RX ADMIN — CALCIUM GLUCONATE: 94 INJECTION, SOLUTION INTRAVENOUS at 08:12

## 2018-12-06 RX ADMIN — ENTECAVIR 0.5 MG: 0.5 TABLET ORAL at 08:12

## 2018-12-06 NOTE — PLAN OF CARE
Problem: Occupational Therapy Goal  Goal: Occupational Therapy Goal  Goals to be met by: 12/8/18 ( Revised: 11/26)    Patient will increase functional independence with ADLs by performing:    UE Dressing with Supervision.  LE Dressing with Supervision.  Grooming while standing at sink with Supervision.   Toileting from toilet with Supervision for hygiene and clothing management.   Toilet transfer to toilet with Supervision.         Outcome: Ongoing (interventions implemented as appropriate)  Continue with POC.   Laura corado OT  12/6/2018

## 2018-12-06 NOTE — SUBJECTIVE & OBJECTIVE
Scheduled Meds:   aspirin  81 mg Oral Daily    atovaquone  1,500 mg Oral Daily    entecavir  0.5 mg Oral Daily    heparin (porcine)  5,000 Units Subcutaneous Q12H    k phos di & mono-sod phos mono  2 tablet Oral BID    magnesium oxide  800 mg Oral BID    metoprolol tartrate  25 mg Oral BID    octreotide  50 mcg Subcutaneous Q8H    pantoprazole  40 mg Oral BID    sodium chloride 0.9%  10 mL Intravenous Q6H    tacrolimus  2 mg Oral BID    vancomycin (VANCOCIN) IVPB  750 mg Intravenous Q24H     Continuous Infusions:   insulin (HUMAN R) infusion (adults) 1.9 Units/hr (12/06/18 2023)    TPN ADULT CENTRAL LINE CUSTOM 50 mL/hr at 12/06/18 2027    TPN ADULT CENTRAL LINE CUSTOM       PRN Meds:sodium chloride, sodium chloride, sodium chloride, acetaminophen, bisacodyl, calcium carbonate, dextrose 50%, dextrose 50%, glucagon (human recombinant), glucose, glucose, insulin aspart U-100, omnipaque, ondansetron, ondansetron, polyethylene glycol, Flushing PICC Protocol **AND** sodium chloride 0.9% **AND** sodium chloride 0.9%, sodium chloride 0.9%, traMADol    Review of Systems   Gastrointestinal: Abdominal pain: improving.     Objective:     Vital Signs (Most Recent):  Temp: 98 °F (36.7 °C) (12/06/18 2023)  Pulse: 75 (12/07/18 0800)  Resp: 13 (12/07/18 0800)  BP: (!) 119/56 (12/07/18 0800)  SpO2: (!) 94 % (12/07/18 0800) Vital Signs (24h Range):  Temp:  [98 °F (36.7 °C)-98.7 °F (37.1 °C)] 98 °F (36.7 °C)  Pulse:  [74-94] 75  Resp:  [13-24] 13  SpO2:  [94 %-100 %] 94 %  BP: (117-136)/(56-78) 119/56     Weight: 56 kg (123 lb 7.3 oz)  Body mass index is 21.87 kg/m².    Intake/Output - Last 3 Shifts       12/05 0700 - 12/06 0659 12/06 0700 - 12/07 0659 12/07 0700 - 12/08 0659    P.O. 120 60     I.V. (mL/kg) 47.5 (0.9) 44.6 (0.8)     Blood       Other 0      IV Piggyback  250      550     Total Intake(mL/kg) 622.5 (11.2) 904.6 (16.2)     Urine (mL/kg/hr) 0 (0) 275 (0.2)     Emesis/NG output 0      Drains 370 310      Other 100      Stool 0      Blood 0      Total Output 470 585     Net +152.5 +319.6            Urine Occurrence 5 x      Stool Occurrence 0 x      Emesis Occurrence 0 x              Laboratory:  Immunosuppressants         Stop Route Frequency     tacrolimus capsule 2 mg      -- Oral 2 times daily        CBC:   Recent Labs   Lab 12/07/18  0530   WBC 2.57*   RBC 3.29*   HGB 9.3*   HCT 30.0*   *   MCV 91   MCH 28.3   MCHC 31.0*     CMP:   Recent Labs   Lab 12/07/18  0530   *   CALCIUM 8.7   ALBUMIN 2.9*   PROT 4.9*      K 3.9   CO2 31*   CL 99   BUN 42*   CREATININE 0.9   ALKPHOS 115   ALT 21   AST 23   BILITOT 1.7*     Coagulation: No results for input(s): PT, INR, APTT in the last 168 hours.  Labs within the past 24 hours have been reviewed.    Diagnostic Results:  I have personally reviewed all pertinent imaging studies.Scheduled Meds:   aspirin  81 mg Oral Daily    atovaquone  1,500 mg Oral Daily    entecavir  0.5 mg Oral Daily    heparin (porcine)  5,000 Units Subcutaneous Q8H    k phos di & mono-sod phos mono  2 tablet Oral BID    magnesium oxide  800 mg Oral BID    metoprolol tartrate  25 mg Oral BID    octreotide  50 mcg Subcutaneous Q8H    pantoprazole  40 mg Oral BID    predniSONE  2.5 mg Oral Daily    sodium chloride 0.9%  10 mL Intravenous Q6H    tacrolimus  2 mg Oral BID    vancomycin (VANCOCIN) IVPB  1,000 mg Intravenous Q24H     Continuous Infusions:   insulin (HUMAN R) infusion (adults) 1.9 Units/hr (12/05/18 1434)    TPN ADULT CENTRAL LINE CUSTOM 50 mL/hr at 12/05/18 2127     PRN Meds:sodium chloride, sodium chloride, sodium chloride, acetaminophen, bisacodyl, calcium carbonate, dextrose 50%, dextrose 50%, glucagon (human recombinant), glucose, glucose, insulin aspart U-100, omnipaque, ondansetron, ondansetron, polyethylene glycol, Flushing PICC Protocol **AND** sodium chloride 0.9% **AND** sodium chloride 0.9%, sodium chloride 0.9%, traMADol    Review of Systems    Constitutional: Positive for activity change. Negative for chills, fatigue and fever.   HENT: Negative for congestion, facial swelling and trouble swallowing.    Eyes: Negative for pain, discharge and visual disturbance.   Respiratory: Negative for cough, chest tightness, shortness of breath and wheezing.    Cardiovascular: Positive for leg swelling. Negative for chest pain and palpitations.   Gastrointestinal: Positive for abdominal distention (much improved) and abdominal pain (improving). Negative for constipation, diarrhea, nausea and vomiting.   Genitourinary: Negative for decreased urine volume, difficulty urinating and dysuria.   Musculoskeletal: Positive for back pain.Negative for arthralgias and neck stiffness.   Skin: Positive for wound.   Allergic/Immunologic: Positive for immunocompromised state.   Neurological: Positive for weakness. Negative for tremors and headaches.   Psychiatric/Behavioral: Negative for confusion and agitation.     Objective:     Vital Signs (Most Recent):  Temp: 98.5 °F (36.9 °C) (12/06/18 0758)  Pulse: 96 (12/06/18 0800)  Resp: 17 (12/06/18 0800)  BP: 121/70 (12/06/18 0800)  SpO2: 97 % (12/06/18 0800) Vital Signs (24h Range):  Temp:  [98.3 °F (36.8 °C)-98.5 °F (36.9 °C)] 98.5 °F (36.9 °C)  Pulse:  [83-97] 96  Resp:  [11-31] 17  SpO2:  [93 %-97 %] 97 %  BP: (113-142)/(66-91) 121/70     Weight: 55.6 kg (122 lb 9.2 oz)  Body mass index is 21.71 kg/m².    Intake/Output - Last 3 Shifts       12/04 0700 - 12/05 0659 12/05 0700 - 12/06 0659 12/06 0700 - 12/07 0659    P.O. 300 120     I.V. (mL/kg) 45.9 (0.8) 47.5 (0.9) 2.8 (0)    Blood 540      Other 0 0     IV Piggyback 250      TPN 1027.8 455 72.5    Total Intake(mL/kg) 2163.7 (39.2) 622.5 (11.2) 75.3 (1.4)    Urine (mL/kg/hr) 1150 (0.9) 0 (0)     Emesis/NG output 0 0     Drains 500 370 90    Other 0 100     Stool 0 0     Blood 0 0     Total Output 1650 470 90    Net +513.7 +152.5 -14.7           Urine Occurrence 3 x 5 x     Stool  Occurrence 0 x 0 x     Emesis Occurrence 0 x 0 x           Physical Exam   Constitutional: She is oriented to person, place, and time. She appears well-developed. No distress.   Temporal and distal extremity muscle wasting   HENT:   Head: Normocephalic and atraumatic.   Eyes: EOM are normal. No scleral icterus.   Neck: Normal range of motion.   Cardiovascular: Normal rate, regular rhythm and normal heart sounds.   No murmur heard.  Pulmonary/Chest: Effort normal. No respiratory distress. She has no wheezes.   Breath sounds decreased at bases   Abdominal: Soft. Bowel sounds are normal. She exhibits distension. There is tenderness. There is no guarding.   Wound vac placed to middle chevron incision + R lateral side, no s/s/i. Percutaneous drain in place RUQ, no s/s/i.   RLQ VALORIE drain sites c/d/i with sutures   Small areas of induration noted lower abdomen - likely hematomas 2/2 heparin injections.  Musculoskeletal: Normal range of motion. She exhibits edema (1+ BLE, + generalized).   Neurological: She is alert and oriented to person, place, and time.   Skin: Skin is warm. She is not diaphoretic.   Psychiatric: She has a normal mood and affect.   Nursing note and vitals reviewed.      Laboratory:  Immunosuppressants         Stop Route Frequency     tacrolimus capsule 2 mg      -- Oral 2 times daily        CBC:   Recent Labs   Lab 12/06/18  0605   WBC 2.93*   RBC 3.53*   HGB 10.1*   HCT 31.7*   *   MCV 90   MCH 28.6   MCHC 31.9*     CMP:   Recent Labs   Lab 12/06/18  0605   *   CALCIUM 8.8   ALBUMIN 2.8*   PROT 5.0*      K 3.8   CO2 34*   CL 98   BUN 45*   CREATININE 0.9   ALKPHOS 119   ALT 28   AST 31   BILITOT 1.7*     Labs within the past 24 hours have been reviewed.    Diagnostic Results:  I have personally reviewed all pertinent imaging studies.Scheduled Meds:   aspirin  81 mg Oral Daily    atovaquone  1,500 mg Oral Daily    entecavir  0.5 mg Oral Daily    heparin (porcine)  5,000 Units  Subcutaneous Q8H    k phos di & mono-sod phos mono  2 tablet Oral BID    magnesium oxide  800 mg Oral BID    metoprolol tartrate  25 mg Oral BID    octreotide  50 mcg Subcutaneous Q8H    pantoprazole  40 mg Oral BID    predniSONE  2.5 mg Oral Daily    sodium chloride 0.9%  10 mL Intravenous Q6H    tacrolimus  2 mg Oral BID    vancomycin (VANCOCIN) IVPB  1,000 mg Intravenous Q24H     Continuous Infusions:   insulin (HUMAN R) infusion (adults) 1.9 Units/hr (12/05/18 1434)    TPN ADULT CENTRAL LINE CUSTOM 50 mL/hr at 12/05/18 2127     PRN Meds:sodium chloride, sodium chloride, sodium chloride, acetaminophen, bisacodyl, calcium carbonate, dextrose 50%, dextrose 50%, glucagon (human recombinant), glucose, glucose, insulin aspart U-100, omnipaque, ondansetron, ondansetron, polyethylene glycol, Flushing PICC Protocol **AND** sodium chloride 0.9% **AND** sodium chloride 0.9%, sodium chloride 0.9%, traMADol    Review of Systems   Gastrointestinal: Abdominal pain: improving.     Objective:     Vital Signs (Most Recent):  Temp: 98.5 °F (36.9 °C) (12/06/18 0758)  Pulse: 96 (12/06/18 0800)  Resp: 17 (12/06/18 0800)  BP: 121/70 (12/06/18 0800)  SpO2: 97 % (12/06/18 0800) Vital Signs (24h Range):  Temp:  [98.3 °F (36.8 °C)-98.5 °F (36.9 °C)] 98.5 °F (36.9 °C)  Pulse:  [83-97] 96  Resp:  [11-31] 17  SpO2:  [93 %-97 %] 97 %  BP: (113-142)/(66-91) 121/70     Weight: 55.6 kg (122 lb 9.2 oz)  Body mass index is 21.71 kg/m².    Intake/Output - Last 3 Shifts       12/04 0700 - 12/05 0659 12/05 0700 - 12/06 0659 12/06 0700 - 12/07 0659    P.O. 300 120     I.V. (mL/kg) 45.9 (0.8) 47.5 (0.9) 2.8 (0)    Blood 540      Other 0 0     IV Piggyback 250      TPN 1027.8 455 72.5    Total Intake(mL/kg) 2163.7 (39.2) 622.5 (11.2) 75.3 (1.4)    Urine (mL/kg/hr) 1150 (0.9) 0 (0)     Emesis/NG output 0 0     Drains 500 370 90    Other 0 100     Stool 0 0     Blood 0 0     Total Output 1650 470 90    Net +513.7 +152.5 -14.7           Urine  Occurrence 3 x 5 x     Stool Occurrence 0 x 0 x     Emesis Occurrence 0 x 0 x           Physical Exam      Laboratory:  Immunosuppressants         Stop Route Frequency     tacrolimus capsule 2 mg      -- Oral 2 times daily        CBC:   Recent Labs   Lab 12/06/18  0605   WBC 2.93*   RBC 3.53*   HGB 10.1*   HCT 31.7*   *   MCV 90   MCH 28.6   MCHC 31.9*     CMP:   Recent Labs   Lab 12/06/18  0605   *   CALCIUM 8.8   ALBUMIN 2.8*   PROT 5.0*      K 3.8   CO2 34*   CL 98   BUN 45*   CREATININE 0.9   ALKPHOS 119   ALT 28   AST 31   BILITOT 1.7*     Coagulation: No results for input(s): PT, INR, APTT in the last 168 hours.  Labs within the past 24 hours have been reviewed.    Diagnostic Results:  I have personally reviewed all pertinent imaging studies.

## 2018-12-06 NOTE — SUBJECTIVE & OBJECTIVE
"Interval HPI:   Overnight events: No acute events overnight. BG decreased from 205-165. BG goal achieved at IIP 1.9 units/hr transition drip with low correction scale. Patient expresses increase in appetite, and is eager to have PO diet. Wound vac remains in place.     Eating:   NPO  Nausea: No  Hypoglycemia and intervention: No  Fever: No  TPN: Yes  If yes, type ofvTPN and rate: 50cc/hr    /72   Pulse 85   Temp 98.5 °F (36.9 °C) (Oral)   Resp 20   Ht 5' 3" (1.6 m)   Wt 55.6 kg (122 lb 9.2 oz)   LMP  (LMP Unknown)   SpO2 97%   Breastfeeding? No   BMI 21.71 kg/m²     Labs Reviewed and Include    Recent Labs   Lab 12/06/18  0605   *   CALCIUM 8.8   ALBUMIN 2.8*   PROT 5.0*      K 3.8   CO2 34*   CL 98   BUN 45*   CREATININE 0.9   ALKPHOS 119   ALT 28   AST 31   BILITOT 1.7*     Lab Results   Component Value Date    WBC 2.93 (L) 12/06/2018    HGB 10.1 (L) 12/06/2018    HCT 31.7 (L) 12/06/2018    MCV 90 12/06/2018     (L) 12/06/2018     No results for input(s): TSH, FREET4 in the last 168 hours.  Lab Results   Component Value Date    HGBA1C 6.0 (H) 10/04/2018       Nutritional status:   Body mass index is 21.71 kg/m².  Lab Results   Component Value Date    ALBUMIN 2.8 (L) 12/06/2018    ALBUMIN 3.1 (L) 12/05/2018    ALBUMIN 3.0 (L) 12/04/2018     Lab Results   Component Value Date    PREALBUMIN 15 (L) 11/03/2018    PREALBUMIN 12 (L) 09/17/2018       Estimated Creatinine Clearance: 48.8 mL/min (based on SCr of 0.9 mg/dL).    Accu-Checks  Recent Labs     12/05/18  0234 12/05/18  0418 12/05/18  0601 12/05/18  0802 12/05/18  1003 12/05/18  1356 12/05/18  1753 12/05/18  2128 12/06/18  0205 12/06/18  0624   POCTGLUCOSE 165* 171* 202* 199* 228* 282* 286* 205* 208* 165*       Current Medications and/or Treatments Impacting Glycemic Control  Immunotherapy:    Immunosuppressants         Stop Route Frequency     tacrolimus capsule 2 mg      -- Oral 2 times daily        Steroids:   Hormones (From " admission, onward)    Start     Stop Route Frequency Ordered    12/03/18 1400  octreotide injection 50 mcg      -- SubQ Every 8 hours 12/03/18 1116    12/01/18 0900  predniSONE tablet 2.5 mg      12/08 0859 Oral Daily 11/05/18 1004    10/17/18 0945  predniSONE tablet 15 mg      10/24 0859 Oral Daily 10/17/18 0943        Pressors:    Autonomic Drugs (From admission, onward)    Start     Stop Route Frequency Ordered    10/06/18 1617  rocuronium (ZEMURON) 10 mg/mL injection     Comments:  Created by cabinet override    10/07 0429   10/06/18 1617        Hyperglycemia/Diabetes Medications:   Antihyperglycemics (From admission, onward)    Start     Stop Route Frequency Ordered    12/05/18 1115  insulin regular (Humulin R) 100 Units in sodium chloride 0.9% 100 mL infusion      -- IV Continuous 12/05/18 1012    12/05/18 1111  insulin aspart U-100 pen 0-4 Units      -- SubQ As needed (PRN) 12/05/18 1012    10/07/18 1200  insulin regular (Humulin R) 100 Units in sodium chloride 0.9% 100 mL infusion      10/13 2100 IV Continuous 10/07/18 1055

## 2018-12-06 NOTE — ASSESSMENT & PLAN NOTE
Managed per primary team  Avoid hypoglycemia    Recent Labs   Lab 12/06/18  0605   ALT 28   AST 31   ALKPHOS 119   BILITOT 1.7*   PROT 5.0*   ALBUMIN 2.8*

## 2018-12-06 NOTE — ASSESSMENT & PLAN NOTE
- Middle chevron incision opened 11/16 with purulent drainage  - Packed with Aquacel ag at this time. Growing Klebsiella p.   - CT abd/pelvis 11/17- no signs of infection  - 11/17: R side of chevron incision w/ 3 small holes draining purulent fluid, opened and packed w/ aquacel.   - Wound care following.  - Wound w/ increased drainage, so wound care placed vac 11/24/18.  Apprec recs. Extending Ertapenem thru 11/28 per ID.    - Abdomen erythematous and indurated RLQ inferior to chevron.  - CT abd/pelvis 11/26 - no sign of infection  - Last wound vac change 12/7 with improvements noted in wound - depth now 0.5cm, so vac to be d/c'd at next dressing change, per wound care.    .

## 2018-12-06 NOTE — PROGRESS NOTES
vanc trough 22.4 - joseline ALLISON informed.  Holding dose for now.  States she will talk with pharmacist about future dose of vanc

## 2018-12-06 NOTE — PROGRESS NOTES
"[facility]  Endocrinology  Progress Note    Admit Date: 10/1/2018     Reason for Consult: Management of type 2 DM, Hyperglycemia      Surgical Procedure and Date: Liver transplant 10/5/18, Ex lap 10/6/18     Diabetes diagnosis year: 2013     Home Diabetes Medications:  Lantus 18 units HS and novolog 17 units with meals plus correction scale (150-200 +1)        Lab Results   Component Value Date     HGBA1C 6.8 (H) 08/16/2018        How often checking glucose at home? 3-4   BG readings on regimen: 120-150.  Post prandial readings as high as upper 200s  Hypoglycemia on the regimen? yes, none recently   Missed doses on regimen?  No     Diabetes Complications include:     Diabetic peripheral neuropathy      Complicating diabetes co morbidities:   CIRRHOSIS        HPI:  Patient is a 68 y.o. female with a diagnosis of ESLD, listed for liver transplant with meld 23 who underwent live transplant on 10/5/18.  Back to OR on 10/6/18 for ex lap.  Admitted 10/1/18 for hyponatremia s/p paracentesis.  Personal has known diagnosis of diabetes, endocrine consulted for diabetes management.    Post-operative course complicated by ESBL Klebsiella pneumoniae in urine (in contact isolation), peritransplant ascites, worsening renal function (required temporary dialysis), anemia (multiple blood transfusions), and possible GI bleed.     Interval HPI:   Overnight events: No acute events overnight. BG decreased from 205-165. BG goal achieved at 1.9 units/hr transition drip with low correction scale. Patient expresses increase in appetite, and is eager to have PO diet. Wound vac remains in place.     Eating:   NPO  Nausea: No  Hypoglycemia and intervention: No  Fever: No  TPN: Yes  If yes, type ofTPN and rate: 50cc/hr    /72   Pulse 85   Temp 98.5 °F (36.9 °C) (Oral)   Resp 20   Ht 5' 3" (1.6 m)   Wt 55.6 kg (122 lb 9.2 oz)   LMP  (LMP Unknown)   SpO2 97%   Breastfeeding? No   BMI 21.71 kg/m²      Labs " Reviewed and Include    Recent Labs   Lab 12/06/18  0605   *   CALCIUM 8.8   ALBUMIN 2.8*   PROT 5.0*      K 3.8   CO2 34*   CL 98   BUN 45*   CREATININE 0.9   ALKPHOS 119   ALT 28   AST 31   BILITOT 1.7*     Lab Results   Component Value Date    WBC 2.93 (L) 12/06/2018    HGB 10.1 (L) 12/06/2018    HCT 31.7 (L) 12/06/2018    MCV 90 12/06/2018     (L) 12/06/2018     No results for input(s): TSH, FREET4 in the last 168 hours.  Lab Results   Component Value Date    HGBA1C 6.0 (H) 10/04/2018       Nutritional status:   Body mass index is 21.71 kg/m².  Lab Results   Component Value Date    ALBUMIN 2.8 (L) 12/06/2018    ALBUMIN 3.1 (L) 12/05/2018    ALBUMIN 3.0 (L) 12/04/2018     Lab Results   Component Value Date    PREALBUMIN 15 (L) 11/03/2018    PREALBUMIN 12 (L) 09/17/2018       Estimated Creatinine Clearance: 48.8 mL/min (based on SCr of 0.9 mg/dL).    Accu-Checks  Recent Labs     12/05/18  0234 12/05/18  0418 12/05/18  0601 12/05/18  0802 12/05/18  1003 12/05/18  1356 12/05/18  1753 12/05/18  2128 12/06/18  0205 12/06/18  0624   POCTGLUCOSE 165* 171* 202* 199* 228* 282* 286* 205* 208* 165*       Current Medications and/or Treatments Impacting Glycemic Control  Immunotherapy:    Immunosuppressants         Stop Route Frequency     tacrolimus capsule 2 mg      -- Oral 2 times daily        Steroids:   Hormones (From admission, onward)    Start     Stop Route Frequency Ordered    12/03/18 1400  octreotide injection 50 mcg      -- SubQ Every 8 hours 12/03/18 1116    12/01/18 0900  predniSONE tablet 2.5 mg      12/08 0859 Oral Daily 11/05/18 1004    10/17/18 0945  predniSONE tablet 15 mg      10/24 0859 Oral Daily 10/17/18 0943        Pressors:    Autonomic Drugs (From admission, onward)    Start     Stop Route Frequency Ordered    10/06/18 1617  rocuronium (ZEMURON) 10 mg/mL injection     Comments:  Created by cabinet override    10/07 0429   10/06/18 1617        Hyperglycemia/Diabetes Medications:    Antihyperglycemics (From admission, onward)    Start     Stop Route Frequency Ordered    12/05/18 1115  insulin regular (Humulin R) 100 Units in sodium chloride 0.9% 100 mL infusion      -- IV Continuous 12/05/18 1012    12/05/18 1111  insulin aspart U-100 pen 0-4 Units      -- SubQ As needed (PRN) 12/05/18 1012    10/07/18 1200  insulin regular (Humulin R) 100 Units in sodium chloride 0.9% 100 mL infusion      10/13 2100 IV Continuous 10/07/18 1055          ASSESSMENT and PLAN    * Liver transplanted    Managed per primary team  Avoid hypoglycemia    Recent Labs   Lab 12/06/18  0605   ALT 28   AST 31   ALKPHOS 119   BILITOT 1.7*   PROT 5.0*   ALBUMIN 2.8*            Type 2 diabetes mellitus with diabetic polyneuropathy, with long-term current use of insulin    BG goal 140-180    Continue transition drip at rate of 1.9 with Low SQCorrection scale.  POC glucose q4 hr given finger sticks        Discharge planning:  TBD       Chronic kidney disease, stage III (moderate)      Managed per primary team  Avoid hypoglycemia. Caution with insulin stacking       Hypokalemia      Managed per primary team  Avoid hypoglycemia  Lab Results   Component Value Date    K 3.8 12/06/2018     Lab Results   Component Value Date    BUN 45 (H) 12/06/2018     Lab Results   Component Value Date    CREATININE 0.9 12/06/2018          Prophylactic immunotherapy    May increase insulin resistance.          EVA (acute kidney injury)    Avoid insulin stacking and hypoglycemia.  Lab Results   Component Value Date    CREATININE 0.9 12/06/2018              Louie Merlos NP  Endocrinology  [facility]

## 2018-12-06 NOTE — ASSESSMENT & PLAN NOTE
BG goal 140-180    Continue transition drip at rate of 1.9 with Low SQCorrection scale.  POC glucose q4 hr given finger sticks        Discharge planning:  TBD

## 2018-12-06 NOTE — PROGRESS NOTES
[facility]  Liver Transplant  Progress Note    Patient Name: Scarlett Reddy  MRN: [id number]  Admission Date: 10/1/2018  Hospital Length of Stay: 65 days  Code Status: Full Code  Primary Care Provider: Primary Doctor No  Post-Operative Day: 62    ORGAN:   LIVER  Disease Etiology: Cirrhosis: Type B and D  Donor Type:    - Brain Death  CDC High Risk:   No  Donor CMV Status:   Donor CMV Status: Positive  Donor HBcAB:   Negative  Donor HCV Status:   Negative  Donor HBV SETH: Negative  Donor HCV SETH: Negative  Whole or Partial: Whole Liver  Biliary Anastomosis: End to End  Arterial Anatomy: Replaced Left Hepatic and Right Hepatic  Subjective:     History of Present Illness:  Ms. Reddy is a 69 y/o female with PMH of ESLD secondary Hep B and D.  Listed for liver transplant with MELD 23.  Paracentesis 10/1 with 5L removed, no fluid sent for analysis.  Morning labs significant for hyponatremia, Na 119.  Pt admitted to LTS for sodium monitoring.      Hospital Course: 69 y/o F h/o HBV/delta cirrhosis s/p DBDLT 10/5/18 (CMV D+/R+, steroid induction, MMF/tacro/pred maintenance) with take back on 10/6 2/2 hyperbilirubinema and bilious AVLORIE drainage, no leak identified. Post-op course c/b hypercapneic and hypoxic respiratory failure and was reintubated though quickly extubated now doing well. Liver bx (10/6) sig for Zone 3 hemorrhage and collapse, in addition to cholestasis. Transferred from ICU on POD #4. ID consulted to comment on positive diphtheroid culture from ascitic fluid (likely skin colonization) as well as ESBL Klebsiella pneumoniae in urine culture (10/4).  Pt asymptomatic and cell count from ascitic fluid unremarkable. Per ID recs, no need to treat diphtheroids or asymptomatic ESBL Kleb pneumo bacteriuria though pt has had 6 days of therapy which would cover these organisms. Mild peritransplant ascites- repeat US 10/6 with increased arterial resistive indices. Repeat US 10/9 w/ continued  elevation in RIs and fluid collections. LFTs trending down nicely.     Acute gradual worsening of renal function s/p OLTx. Creatinine on arrival 0.8 and has been up trending. Nephrology consulted for post-op EVA. Cr cont to trend up but remains to have good UOP. Had stable response to lasiz diuresis.  BUN elevated requiring multiple rounds of dialysis (10/15, 10/17, 10/20, 10/31). No significant improvements in kidney function noted.   In addition, pt H/H cont to fluctuate requiring multiple blood transfusions (10/14, 10/16, 10/18, 10/19, 10/22, 10/27, 10/29).  Pt reported dark stools 10/15 but color has normalized. FOBT+. EGD 10/18 consistent with ulcerated mucosa in duodenum s/p coagulation. Concern for H. Pylori. Started Amoxicillin/levofloxacin (10/19, complete 11/2)). Remains on Protonix 40 mg BID. Biopsy negative for infection and CMV. Will cont close monitoring H/H, transfuse as needed with goal Hb > 7.0. Of note, Urine cx + Klebsiella pneumoniae (10/13). Received 3 day course of ciprofloxacin, dc'd 10/19.   On 10/19 pm pt c/o crushing chest pain. Troponin 0.094, likely 2/2 ischemic stress. EKG NSR.   Liver US 11/6 showed no arterial flow. Paracentesis 11/7 with 8.9 L removed. Wbc 39, segs 16%. Gram stain no organisms seen + no WBC. Pt feels better since having fluid removed but she continues to have worsening abdominal distention & LE edema.Repeat Liver US 11/8 showed arterial flow with increased RIs. Paracentesis 11/12 with 8.6L removed (WBC 34, segs 25%). 11/15 liver u/s showed abnormally decreased main hepatic artery peak systolic velocity with slow arterial flow and abnormal tardus parvus waveforms intrahepatically. Consulted vascular surgery. Repeat liver US 11/19 showed no arterial flow on R and sluggish flow on L. Hepatic angiogram 11/19 confirmed HAT.  Friday 11/16, purulent drainage noted from middle of chevron incision. Area opened and packed. Cx grew klebsiella p. CT scan showed no signs of  infection. On Monday 11/18, it was noted that the right/lateral side of the chevron had 3 small openings draining purulent fluid. Opened the area and packed. Vac placed on 11/24. Wound care following. CT abd/pelvis 11/3 showed small volume abdominal ascites, improved compared to prior, pelvis ascites unchanged from prior, unchanged b/l pleural effusions (large on R, moderate on L) with bibasilar compressive atelectasis and consolidative changes. Patient finished day 7 of 7 of Ertapenem for kleb pneumo ESBL UTI 11/29. Of note, pt grew same bacteria from wound 11/16. Liver US to assess recanalization post HAT 12/4 -> conclusion that pt may recanalize. Will monitor closely with no further intervention planned at this time.    Interval history: No acute events overnight. TPN + octreotide started 12/3. Pt feeling well today. Peritoneal fluid (perc drain) continues to improve in color w/ TPN.  Will continue w/ TPN + clear liquid diet. 25% Albumin x 1 today (alb 2.0). Co2 elevated at 34. D/c'd lasix yesterday. Cr fx and LFTs stable. Will continue to monitor. H/H stable. Heparin injections still causing prolonged bleeding. Will decrease frequency of heparin to q12h instead of q8h and monitor closely. Pt ambulating with assistance and OOBTC often. Peritoneal fluid 11/29 w/ enterococcus. Per ID, treating with Vanc x 10 days and then repeat peritoneal fluid cx. Wound vac changed 12/4, w/ improvements noted.  Per wound care, will stop vac when depth of wound is <0.5 cm.     Scheduled Meds:   aspirin  81 mg Oral Daily    atovaquone  1,500 mg Oral Daily    entecavir  0.5 mg Oral Daily    heparin (porcine)  5,000 Units Subcutaneous Q8H    k phos di & mono-sod phos mono  2 tablet Oral BID    magnesium oxide  800 mg Oral BID    metoprolol tartrate  25 mg Oral BID    octreotide  50 mcg Subcutaneous Q8H    pantoprazole  40 mg Oral BID    predniSONE  2.5 mg Oral Daily    sodium chloride 0.9%  10 mL Intravenous Q6H     tacrolimus  2 mg Oral BID    vancomycin (VANCOCIN) IVPB  1,000 mg Intravenous Q24H     Continuous Infusions:   insulin (HUMAN R) infusion (adults) 1.9 Units/hr (12/05/18 1434)    TPN ADULT CENTRAL LINE CUSTOM 50 mL/hr at 12/05/18 2127     PRN Meds:sodium chloride, sodium chloride, sodium chloride, acetaminophen, bisacodyl, calcium carbonate, dextrose 50%, dextrose 50%, glucagon (human recombinant), glucose, glucose, insulin aspart U-100, omnipaque, ondansetron, ondansetron, polyethylene glycol, Flushing PICC Protocol **AND** sodium chloride 0.9% **AND** sodium chloride 0.9%, sodium chloride 0.9%, traMADol    Review of Systems   Constitutional: Positive for activity change. Negative for chills, fatigue and fever.   HENT: Negative for congestion, facial swelling and trouble swallowing.    Eyes: Negative for pain, discharge and visual disturbance.   Respiratory: Negative for cough, chest tightness, shortness of breath and wheezing.    Cardiovascular: Positive for leg swelling. Negative for chest pain and palpitations.   Gastrointestinal: Positive for abdominal distention and abdominal pain (improving). Negative for constipation, diarrhea, nausea and vomiting.   Genitourinary: Negative for decreased urine volume, difficulty urinating and dysuria.   Musculoskeletal: Positive for back pain.Negative for arthralgias and neck stiffness.   Skin: Positive for wound.   Allergic/Immunologic: Positive for immunocompromised state.   Neurological: Positive for weakness. Negative for tremors and headaches.   Psychiatric/Behavioral: Negative for confusion and agitation.     Objective:     Vital Signs (Most Recent):  Temp: 98.5 °F (36.9 °C) (12/06/18 0758)  Pulse: 96 (12/06/18 0800)  Resp: 17 (12/06/18 0800)  BP: 121/70 (12/06/18 0800)  SpO2: 97 % (12/06/18 0800) Vital Signs (24h Range):  Temp:  [98.3 °F (36.8 °C)-98.5 °F (36.9 °C)] 98.5 °F (36.9 °C)  Pulse:  [83-97] 96  Resp:  [11-31] 17  SpO2:  [93 %-97 %] 97 %  BP:  (113-142)/(66-91) 121/70     Weight: 55.6 kg (122 lb 9.2 oz)  Body mass index is 21.71 kg/m².    Intake/Output - Last 3 Shifts       12/04 0700 - 12/05 0659 12/05 0700 - 12/06 0659 12/06 0700 - 12/07 0659    P.O. 300 120     I.V. (mL/kg) 45.9 (0.8) 47.5 (0.9) 2.8 (0)    Blood 540      Other 0 0     IV Piggyback 250      TPN 1027.8 455 72.5    Total Intake(mL/kg) 2163.7 (39.2) 622.5 (11.2) 75.3 (1.4)    Urine (mL/kg/hr) 1150 (0.9) 0 (0)     Emesis/NG output 0 0     Drains 500 370 90    Other 0 100     Stool 0 0     Blood 0 0     Total Output 1650 470 90    Net +513.7 +152.5 -14.7           Urine Occurrence 3 x 5 x     Stool Occurrence 0 x 0 x     Emesis Occurrence 0 x 0 x           Physical Exam   Constitutional: She is oriented to person, place, and time. She appears well-developed. No distress.   Temporal and distal extremity muscle wasting   HENT:   Head: Normocephalic and atraumatic.   Eyes: EOM are normal. No scleral icterus.   Neck: Normal range of motion.   Cardiovascular: Normal rate, regular rhythm and normal heart sounds.   No murmur heard.  Pulmonary/Chest: Effort normal. No respiratory distress. She has no wheezes.   Breath sounds decreased at bases   Abdominal: Soft. Bowel sounds are normal. She exhibits distension. There is tenderness. There is no guarding.   Wound vac placed to middle chevron incision + R lateral side, no s/s/i. Percutaneous drain in place RUQ, no s/s/i.   RLQ VALORIE drain sites c/d/i with sutures   Musculoskeletal: Normal range of motion. She exhibits edema (2+ BLE, + generalized).   Neurological: She is alert and oriented to person, place, and time.   Skin: Skin is warm. She is not diaphoretic.   Psychiatric: She has a normal mood and affect.   Nursing note and vitals reviewed.      Laboratory:  Immunosuppressants         Stop Route Frequency     tacrolimus capsule 2 mg      -- Oral 2 times daily        CBC:   Recent Labs   Lab 12/06/18  0605   WBC 2.93*   RBC 3.53*   HGB 10.1*   HCT  31.7*   *   MCV 90   MCH 28.6   MCHC 31.9*     CMP:   Recent Labs   Lab 12/06/18  0605   *   CALCIUM 8.8   ALBUMIN 2.8*   PROT 5.0*      K 3.8   CO2 34*   CL 98   BUN 45*   CREATININE 0.9   ALKPHOS 119   ALT 28   AST 31   BILITOT 1.7*     Labs within the past 24 hours have been reviewed.    Diagnostic Results:  I have personally reviewed all pertinent imaging studies.Scheduled Meds:   aspirin  81 mg Oral Daily    atovaquone  1,500 mg Oral Daily    entecavir  0.5 mg Oral Daily    heparin (porcine)  5,000 Units Subcutaneous Q8H    k phos di & mono-sod phos mono  2 tablet Oral BID    magnesium oxide  800 mg Oral BID    metoprolol tartrate  25 mg Oral BID    octreotide  50 mcg Subcutaneous Q8H    pantoprazole  40 mg Oral BID    predniSONE  2.5 mg Oral Daily    sodium chloride 0.9%  10 mL Intravenous Q6H    tacrolimus  2 mg Oral BID    vancomycin (VANCOCIN) IVPB  1,000 mg Intravenous Q24H     Continuous Infusions:   insulin (HUMAN R) infusion (adults) 1.9 Units/hr (12/05/18 1434)    TPN ADULT CENTRAL LINE CUSTOM 50 mL/hr at 12/05/18 2127     PRN Meds:sodium chloride, sodium chloride, sodium chloride, acetaminophen, bisacodyl, calcium carbonate, dextrose 50%, dextrose 50%, glucagon (human recombinant), glucose, glucose, insulin aspart U-100, omnipaque, ondansetron, ondansetron, polyethylene glycol, Flushing PICC Protocol **AND** sodium chloride 0.9% **AND** sodium chloride 0.9%, sodium chloride 0.9%, traMADol    Review of Systems   Gastrointestinal: Abdominal pain: improving.     Objective:     Vital Signs (Most Recent):  Temp: 98.5 °F (36.9 °C) (12/06/18 0758)  Pulse: 96 (12/06/18 0800)  Resp: 17 (12/06/18 0800)  BP: 121/70 (12/06/18 0800)  SpO2: 97 % (12/06/18 0800) Vital Signs (24h Range):  Temp:  [98.3 °F (36.8 °C)-98.5 °F (36.9 °C)] 98.5 °F (36.9 °C)  Pulse:  [83-97] 96  Resp:  [11-31] 17  SpO2:  [93 %-97 %] 97 %  BP: (113-142)/(66-91) 121/70     Weight: 55.6 kg (122 lb 9.2  oz)  Body mass index is 21.71 kg/m².    Intake/Output - Last 3 Shifts       12/04 0700 - 12/05 0659 12/05 0700 - 12/06 0659 12/06 0700 - 12/07 0659    P.O. 300 120     I.V. (mL/kg) 45.9 (0.8) 47.5 (0.9) 2.8 (0)    Blood 540      Other 0 0     IV Piggyback 250      TPN 1027.8 455 72.5    Total Intake(mL/kg) 2163.7 (39.2) 622.5 (11.2) 75.3 (1.4)    Urine (mL/kg/hr) 1150 (0.9) 0 (0)     Emesis/NG output 0 0     Drains 500 370 90    Other 0 100     Stool 0 0     Blood 0 0     Total Output 1650 470 90    Net +513.7 +152.5 -14.7           Urine Occurrence 3 x 5 x     Stool Occurrence 0 x 0 x     Emesis Occurrence 0 x 0 x           Physical Exam      Laboratory:  Immunosuppressants         Stop Route Frequency     tacrolimus capsule 2 mg      -- Oral 2 times daily        CBC:   Recent Labs   Lab 12/06/18  0605   WBC 2.93*   RBC 3.53*   HGB 10.1*   HCT 31.7*   *   MCV 90   MCH 28.6   MCHC 31.9*     CMP:   Recent Labs   Lab 12/06/18  0605   *   CALCIUM 8.8   ALBUMIN 2.8*   PROT 5.0*      K 3.8   CO2 34*   CL 98   BUN 45*   CREATININE 0.9   ALKPHOS 119   ALT 28   AST 31   BILITOT 1.7*     Coagulation: No results for input(s): PT, INR, APTT in the last 168 hours.  Labs within the past 24 hours have been reviewed.    Diagnostic Results:  I have personally reviewed all pertinent imaging studies.    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   - POD 1 US: increased arterial resistive indices, suggestive of edema vs acute rejection vs congestion.  - Repeat US 10/9 with continued elevation of RIs.  - LFTs stable.  - T bili trending down.   - Repeat liver US (10/11): elevated RIs.  - Liver US 11/6 to assess for ascites. No arterial flow within the liver allograft concerning for interval thrombosis/occlusion of the arterial system. Severe  "ascites seen.  - Liver US 11/8: arterial flow seen w/ elevated RIs   - LFTs remain stable. No CTA at this time due to EVA and normal liver function.   - Liver US 11/15 showed "abnormally decreased main hepatic artery peak systolic velocity with slow arterial flow and abnormal tardus parvus waveforms intrahepatically."   -Vascular surgery consulted  - Liver US 11/19 showed no arterial flow on the right and sluggish flow on the left.   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 No further intervention planned at this time as patient's LFTs are stable. Will monitor.  - CT abd/pelvis 11/26 showed: 1. Persistent large volume of ascites. 2. Large right and moderate left pleural effusions with adjacent basilar atelectasis. 3. Anasarca  - IR placed perc drain 11/29 due to suspicion for chyle leak. 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  - Fluid also growing enterococcus and will plan for treatment with Vanc x 10 days per ID recs. Will need repeat fluid cx post treatment.       Hypoalbuminemia    - Albumin 25% x 1 today.  - Continue to monitor.     Sequelae of hyperalimentation    - Monitor.       Tachycardia    - Pt w/ tachycardia but now improved.   - Metoprolol 12.5 mg BID for rate control. HR improved, but still elevated. BP stable.  - 11/30 -> increased Metoprolol to 25 mg BID   - Monitor closely.       Pleural effusion    - Common post liver transplant  - CT 11/26 showed: Large right and moderate left pleural effusions with adjacent basilar atelectasis.  - Pt sp02 stable on RA, but c/o some orthopnea.  - CT abd/pelvis 12/3 showed unchanged b/l pleural effusions (large on R, moderate on L) with bibasilar compressive atelectasis and consolidative changes.  - Sp02 stable & no signs of respiratory infection. Will continue to monitor.       Chronic kidney disease, stage III (moderate)    - Kidney fx improving  - Monitor       Hypomagnesemia    - " "Continue PO Mag Oxide  - Continue to monitor.       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   - Continue to monitor.     Hypophosphatemia    - Continue k phos and monitor closely.       Delayed surgical wound healing    - Middle chevron incision opened 11/16 with purulent drainage  - Packed with Aquacel ag at this time. Growing Klebsiella p.   - CT abd/pelvis 11/17- no signs of infection  - 11/17: R side of chevron incision w/ 3 small holes draining purulent fluid, opened and packed w/ aquacel.   - Wound care following.  - Wound w/ increased drainage, so wound care placed vac 11/24/18.  Apprec recs. Extending Ertapenem thru 11/28 per ID.    - Abdomen erythematous and indurated RLQ inferior to chevron.  - CT abd/pelvis 11/26 - no sign of infection  - Last wound vac change 12/4 with improvements noted in wound.    .       Hepatic artery thrombosis of transplanted liver    - Liver US 11/19 showed no arterial flow to R and sluggish flow to L  - Hepatic angiogram 11/19 confirmed HAT  - Liver US 12/4 to evaluate for recanalization reviewed. Recanalization possible. Will continue to monitor.  - No further intervention planned at this time. Will continue to monitor.     Other drug-induced pancytopenia    - Decreased plt, wbc, h/h  - See "leukopenia"  - Continue to monitor.     Thrombocytopenia, unspecified    - Monitor.       Alteration in skin integrity    - Monitor.       Leukopenia    - Stopped Cellcept, Bactrim, and Valcyte 10/31   - Cellcept half dose resumed 11/6  - Started Atovaquone 11/6.   - CMV PCR noted to be positive at 129. Will hold off on treatment with valcyte at this time. Monitoring weekly CMV PCRs.  - Cellcept held 11/23 2/2 infection (UTI + wound)  - Continue to monitor.       Azotemia    - CRRT 10/31 for uremic toxin clearance.           UTI (urinary tract infection)    - Urine Cx 10/15 w/ Klebsiella pneumonia.  - " "Started Ciprofloxacin (10/17-10/19).   - Started on amoxicillin/levo for H. Pylori (completed abx 11/2).  - Pt c/o lower back pain 11/22. UA w/ trace leuks, moderate wbs, 1+ blood, cx pending  - Pt symptomatic (back pain) & with ESBL in wound previously.  Ertapenem 1g QD x 3 days (started 11/23), per ID recs.    - Given pt cont to have back pain and concern for infection in wound ( thick drainage and increased drainage)-  extended Ertapenem for 4 more days per ID (stop date 11/29/18).         Acute blood loss anemia    - Expected post transplant  - H&H cont to fluctuate.   - FOBT +.  - EGD 10/18 - showed diffuse bleeding with ulcerated duodenal mucosa.  - EGD 11/5 - improvement seen, no active bleeding.  - Resume heparin sq injections 11/6.   - Decrease heparin sq from TID to BID 11/15   - Heparin dc'd 11/17 then resumed a few days later 2/2 risk vs. Benefit  - Pt continues to have prolonged bleeding from heparin injection sites, decreased frequency to q12 from q8 as pt has been ambulating and up in chair frequently.  - See "anemia requiring transfusions."  - Continue to monitor.             Post LT - Acute renal failure    - Creatinine on arrival 0.8 and trended up.  - Nephrology consulted.  - Renal US (10/11): no hydronephrosis.  - Echo 10/12: diastolic dysfunction, likely 2/2 to ARF.  - CRRT 10/14; SLED 10/17, 10/20. --> no improvements noted in kidney fxn.  - 24 hr urine creatine collection: CrCl of 7.   - CRRT 10/31 with 1L removed.   - Cr level now with improvement.   - See "hypervolemia associated with renal insufficiency"     Hypervolemia associated with renal insufficiency    - CRRT 10/14, 10/17, 10/20, 10/31.  - CXR 10/30: abundant fluid in b/l pleural space.  - Renal insufficiency resolved. Cr stable. Co2 elevated. D/c'd Lasix 12/5.  - Monitor daily weights.       EVA (acute kidney injury)    - No plans for RRT in near future, per nephrology  - Trialysis line removed 11/13  - Nephrology involved. " Appreciate recs.  - Cr improved & stable.  - Continue to monitor.     At risk for opportunistic infections    - Valcyte for CMV prophylaxis--> holding (10/31) for leukopenia. Pt will need weekly CMV PCR while discontinued.  - CMV PCR noted to be positive at 129. .  - CMV PCR 11/8: 105  - CMV PCR 11/15 with 89 copies  - CMV PCR 11/23 95 - increased from previous. Continue to hold Valcyte & monitor pt closely.  - CMV PCR 11/29 146 - per ID, will continue to monitor w/ weekly PCRs.  - CMV PCR 12/6 pending.  - Bactrim for PCP prophylaxis ([facility]).--> holding (10/31) for leukopenia.   - Start Atovaquone 11/6.  - Fluconazole for fungal prophylaxis (first dose 10/6). D/c Fluc 11/16 bc pt had been on it for > 30 days post take back.       Long-term use of immunosuppressant medication    - Prograf: stopped 10/29. Resumed 11/2.  --> Will monitor for signs of toxic side effects, check daily troughs, and change meds accordingly.   - Prednisone: stopped 10/29. Started on Hydrocortisone. pred taper resumed.  - Cellcept: stopped 10/31 for leukopenia. Restarted Cellcept 500 mg bid, 11/6.  - Cellcept held 11/23 2/2 infection         Prophylactic immunotherapy    - See long term use immunosuppresion.       Adrenal cortical steroids causing adverse effect in therapeutic use           Other ascites    - Paracentesis 10/1, 5L removed, no fluid sent for analysis.  - Pt remains volume overloaded.   - Liver US 11/6 with severe ascites.   - Paracentesis 11/7 with 8.9 L removed. Wbc 39, segs 16%. Gram stain - no organisms seen and no WBC. Cultures NGTD.  - Paracentesis 11/12: 8.6 L removed. 34 wbc, 25% segs, 33 lymphs, 42 monocytes/macrophages. Cultures NGTD  - Paracentesis 11/21 with 4 L removed, neg for infection.  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 Milky appearance, cell count of fluid (51% lymphs), triglycerides 2208, consistent with chyle leak.   - Peritoneal fluid 11/29 growing enterococcus and will plan  for treatment with Vanc x 10 days per ID recs.   - Attempted low fat, high protein diet due to suspicion for chyle leak with no improvement seen in fluid character.  - CT abd/pelvis 12/3 shows small volume abdominal ascites, improved compared to prior, pelvis ascites unchanged from prior, unchanged b/l pleural effusions (large on R, moderate on L) with bibasilar compressive atelectasis and consolidative changes.  - Placed PICC line and started TPN + octreotide 12/3. Improvement in color of fluid seen 12/6 (less milky, more abbie). Will continue with TPN + clear liquid diet.  - Continue to monitor.     Weakness    - See physical deconditioning.       Physical deconditioning    - PT/OT consulted.  Progressing well w/ therapy.  - Recommend home health PT and rolling walker at discharge (orders placed 11/6).       Hypotension    - d/c'd midodrine 11/28 as patient has not required it recently  - Continue to monitor.     Anemia requiring transfusions    - Fluctuating H&H.    - Transfusions: 10/14; 10/16; 10/18; 10/19; 10/22; 10/27; 10/29, 10/31, 11/3, 11/21, 11/29, 12/4  - FOBT +  - LDH elevated, Hapto < 10.  - EGD 10/18: ulcerated duodenal mucosa.   - Consulted GI about this ongoing issue and further need of another EGD.  - EGD 11/5 unremarkable.   - Iron studies 11/13:  95, TIBC 107, sat iron 89, transferrin 72, ferritin pending  - Hemolytic anemia workup 11/13: Haptoglobin 132, retic 3.9   - H/H dropped 12/4 - transfused 2u PRBC with good response  - No overt signs of bleeding.  - Continue to monitor.           Severe malnutrition    - Dietary consulted.   - Temporal and distal extremity muscle wasting and edema.  - Encourage supplements and to increase nutritional intake.  - Per nephrology, pt to be on low protein diet.  - Prealbumin reviewed.   - Tolerating diet.  Denies n/v.    - Attempted low fat, high protein diet due to suspicion for chyle leak with no improvement seen in fluid character.  - Started TPN  12/3 to treat chylous ascites. Clear liquid diet approved by surgeon (in addition to) TPN 12/5  - Monitor.         Type 2 diabetes mellitus with diabetic polyneuropathy, with long-term current use of insulin    - Continue home regimen.  - TPN started 12/3 and pt now on insulin drip.  - Endocrine following. Appreciate recs.       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  - Tx w/ Entecavir- dose changed to daily 11/26 due to improved renal fx  - 12/3 Hep B surface antigen negative 12/3, surface AB quant 571, and Hep B DNA <10.  - Continue to monitor, per protocol.         VTE Risk Mitigation (From admission, onward)        Ordered     heparin (porcine) injection 5,000 Units  Every 12 hours      12/06/18 1058     IP VTE HIGH RISK PATIENT  Once      11/05/18 1145     Place sequential compression device  Until discontinued      10/05/18 0709          The patients clinical status was discussed at multidisplinary rounds, involving transplant surgery, transplant medicine, pharmacy, nursing, nutrition, and social work    Discharge Planning:  No Patient Care Coordination Note on file.      Elizabeth Dean PA-C  Liver Transplant  [facility]

## 2018-12-06 NOTE — ASSESSMENT & PLAN NOTE
Managed per primary team  Avoid hypoglycemia  Lab Results   Component Value Date    K 3.8 12/06/2018     Lab Results   Component Value Date    BUN 45 (H) 12/06/2018     Lab Results   Component Value Date    CREATININE 0.9 12/06/2018

## 2018-12-06 NOTE — ASSESSMENT & PLAN NOTE
"- Expected post transplant  - H&H cont to fluctuate.   - FOBT +.  - EGD 10/18 - showed diffuse bleeding with ulcerated duodenal mucosa.  - EGD 11/5 - improvement seen, no active bleeding.  - Resume heparin sq injections 11/6.   - Decrease heparin sq from TID to BID 11/15   - Heparin dc'd 11/17 then resumed a few days later 2/2 risk vs. Benefit  - Pt continues to have prolonged bleeding from heparin injection sites, decreased frequency to q12 from q8 as pt has been ambulating and up in chair frequently.  - See "anemia requiring transfusions."  - Continue to monitor.         "

## 2018-12-06 NOTE — ASSESSMENT & PLAN NOTE
- Valcyte for CMV prophylaxis--> holding (10/31) for leukopenia. Pt will need weekly CMV PCR while discontinued.  - CMV PCR noted to be positive at 129. .  - CMV PCR 11/8: 105  - CMV PCR 11/15 with 89 copies  - CMV PCR 11/23 95 - increased from previous. Continue to hold Valcyte & monitor pt closely.  - CMV PCR 11/29 146 - per ID, will continue to monitor w/ weekly PCRs.  - CMV PCR 12/6 pending.  - Bactrim for PCP prophylaxis ([facility]).--> holding (10/31) for leukopenia.   - Start Atovaquone 11/6.  - Fluconazole for fungal prophylaxis (first dose 10/6). D/c Fluc 11/16 bc pt had been on it for > 30 days post take back.

## 2018-12-06 NOTE — ASSESSMENT & PLAN NOTE
- Paracentesis 10/1, 5L removed, no fluid sent for analysis.  - Pt remains volume overloaded.   - Liver US 11/6 with severe ascites.   - Paracentesis 11/7 with 8.9 L removed. Wbc 39, segs 16%. Gram stain - no organisms seen and no WBC. Cultures NGTD.  - Paracentesis 11/12: 8.6 L removed. 34 wbc, 25% segs, 33 lymphs, 42 monocytes/macrophages. Cultures NGTD  - Paracentesis 11/21 with 4 L removed, neg for infection.  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 Milky appearance, cell count of fluid (51% lymphs), triglycerides 2208, consistent with chyle leak.   - Peritoneal fluid 11/29 growing enterococcus and will plan for treatment with Vanc x 10 days per ID recs.   - Attempted low fat, high protein diet due to suspicion for chyle leak with no improvement seen in fluid character.  - CT abd/pelvis 12/3 shows small volume abdominal ascites, improved compared to prior, pelvis ascites unchanged from prior, unchanged b/l pleural effusions (large on R, moderate on L) with bibasilar compressive atelectasis and consolidative changes.  - Placed PICC line and started TPN + octreotide 12/3. Improvement in color of fluid seen 12/6 - 12/7 (less milky, more abbie). Will continue with TPN + clear liquid diet at this time.  - Continue to monitor.

## 2018-12-06 NOTE — PT/OT/SLP PROGRESS
Occupational Therapy   Treatment    Name: Scarlett Reddy  MRN: [id number]  Admitting Diagnosis:  Liver transplanted  17 Days Post-Op    Recommendations:     Discharge Recommendations: home health PT, home health OT  Discharge Equipment Recommendations:  walker, rolling  Barriers to discharge:  None    Subjective     Pain/Comfort:  · Pain Rating 1: 0/10  · Pain Rating Post-Intervention 1: 0/10    Objective:     Communicated with: RN prior to session.  Patient found with all lines intact, call button in reach and RN notified and wound vac, telemetry, pulse ox (continuous), VALORIE drain upon OT entry to room.    General Precautions: Standard, fall, contact   Orthopedic Precautions:N/A   Braces: N/A     Occupational Performance:    Bed Mobility:    · NT, pt found seated UIC upon arrival.     Functional Mobility/Transfers:  · Patient completed Sit <> Stand Transfer with minimum assistance  with  rolling walker   · Functional Mobility: Pt completed functional mobility in hallway ( ~300 ft) using RW and CGA-SBA. She tolerated well with no LOB, SOB, or seated rest break.     Activities of Daily Living:  · Upper Body Dressing: minimum assistance to casper personal robe while seated UIC  · Lower Body Dressing: Minimal assistance to casper personal slipper     AMPAC 6 Click ADL: 19    Treatment & Education:  -Pt edu on OT role/POC  -Importance of OOB activity with staff assistance ( UIC throughout the day)  -Safety during functional t/f and mobility ( RW management, proper posture)  - White board updated  - Multiple self care tasks completed-- assistance level noted above  - All questions/concerns answered within OT scope of practice   - Pt completed gentle cervical and thoracic spine stretches within available planes of motion     Patient left up in chair with all lines intact, call button in reach and RN notified  Education:    Assessment:     Scarlett Reddy is a 68 y.o. female with a medical diagnosis of Liver transplanted. Pt alert and  motivated to participate in therapy session. She presents with the following performance deficits affecting function are gait instability, weakness, impaired balance, impaired endurance, impaired self care skills, impaired functional mobilty, impaired cardiopulmonary response to activity, edema. She demo's progress with self care tasks and overall activity tolerance this date. Pt would benefit from HHOT following d/c to continue to progress towards goals and ensure safe transition to home environments.     Rehab Prognosis:  Good; patient would benefit from acute skilled OT services to address these deficits and reach maximum level of function.       Plan:     Patient to be seen 3 x/week to address the above listed problems via self-care/home management, therapeutic activities, therapeutic exercises, neuromuscular re-education  · Plan of Care Expires: 12/07/18  · Plan of Care Reviewed with: patient, spouse    This Plan of care has been discussed with the patient who was involved in its development and understands and is in agreement with the identified goals and treatment plan    GOALS:   Multidisciplinary Problems     Occupational Therapy Goals        Problem: Occupational Therapy Goal    Goal Priority Disciplines Outcome Interventions   Occupational Therapy Goal     OT, PT/OT Ongoing (interventions implemented as appropriate)    Description:  Goals to be met by: 12/8/18 ( Revised: 11/26)    Patient will increase functional independence with ADLs by performing:    UE Dressing with Supervision.  LE Dressing with Supervision.  Grooming while standing at sink with Supervision.   Toileting from toilet with Supervision for hygiene and clothing management.   Toilet transfer to toilet with Supervision.                          Time Tracking:     OT Date of Treatment: 12/06/18  OT Start Time: 0915  OT Stop Time: 0944  OT Total Time (min): 29 min    Billable Minutes:Therapeutic Exercise 29    Laura corado OT  12/6/2018

## 2018-12-06 NOTE — PROGRESS NOTES
" [facility]  Adult Nutrition  Progress Note     SUMMARY       Recommendations  Recommendation/Intervention:  1. When medically able, ADAT   2. Continue supplemental TPN as needed- meets >85% EEN/EPN   3. Dietitian Following    Goals:   1. Patient to meet >85% EEN/EPN.   2. 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 No N/V.NFPE 10/3/18. Patient with Severe Malnutrition continues.  Nutrition Discharge Planning: Adequate po intake    Nutrition Risk Screen  Nutrition Risk Screen: cultural or Congregation food preferences    Nutrition/Diet History  Patient Reported Diet/Restrictions/Preferences: diabetic diet, low salt(Cultural preferences)  Food Preferences: Cultural preferences Noted  Do you have any cultural, spiritual, Congregation conflicts, given your current situation?: none  Supplemental Drinks or Food Habits: Boost Plus, Ensure Plus(Strawberry)  Food Allergies: NKFA  Factors Affecting Nutritional Intake: clear liquid diet    Anthropometrics  Temp: 98.5 °F (36.9 °C)  Height Method: Stated  Height: 5' 3" (160 cm)  Height (inches): 63 in  Weight Method: Standard Scale  Weight: 55.6 kg (122 lb 9.2 oz)  Weight (lb): 122.58 lb  Ideal Body Weight (IBW), Female: 115 lb  % Ideal Body Weight, Female (lb): 101.8 lb  BMI (Calculated): 20.8  BMI Grade: 25 - 29.9 - overweight    Lab/Procedures/Meds  Labs: Reviewed    12/06/2018    K 3.8 12/06/2018    BUN 45 (H) 12/06/2018    CREATININE 0.9 12/06/2018    CALCIUM 8.8 12/06/2018    PHOS 2.5 (L) 12/06/2018    MG 1.9 12/06/2018    ALBUMIN 2.8 (L) 12/06/2018 "      ALT 28 12/06/2018    AST 31 12/06/2018    ALKPHOS 119 12/06/2018     POCT Glucose   Date Value Ref Range Status   12/06/2018 165 (H) 70 - 110 mg/dL Final   12/06/2018 208 (H) 70 - 110 mg/dL Final   12/05/2018 205 (H) 70 - 110 mg/dL Final   12/05/2018 286 (H) 70 - 110 mg/dL Final     Meds: Reviewed  Scheduled Meds:   albumin human 25%  25 g Intravenous Once    aspirin  81 mg Oral Daily    atovaquone  1,500 mg Oral Daily    entecavir  0.5 mg Oral Daily    heparin (porcine)  5,000 Units Subcutaneous Q12H    k phos di & mono-sod phos mono  2 tablet Oral BID    magnesium oxide  800 mg Oral BID    metoprolol tartrate  25 mg Oral BID    octreotide  50 mcg Subcutaneous Q8H    pantoprazole  40 mg Oral BID    predniSONE  2.5 mg Oral Daily    sodium chloride 0.9%  10 mL Intravenous Q6H    tacrolimus  2 mg Oral BID    vancomycin (VANCOCIN) IVPB  1,000 mg Intravenous Q24H     Continuous Infusions:   insulin (HUMAN R) infusion (adults) 1.9 Units/hr (12/05/18 1434)    TPN ADULT CENTRAL LINE CUSTOM 50 mL/hr at 12/05/18 2127     Physical Findings/Assessment  Overall Physical Appearance: loss of subcutaneous fat, loss of muscle mass  Oral/Mouth Cavity: tooth/teeth missing  Skin: edema, intact, skin tear(Skin Tear Lower Abdomen)    Estimated/Assessed Needs  Weight Used For Calorie Calculations: 55.6 kg (122 lb 9.2 oz)  Energy Calorie Requirements (kcal): 1668  Energy Need Method: Kcal/kg(30 kcal/kg)  Protein Requirements: 66-78(1.2-1.4 gm/kg)  Weight Used For Protein Calculations: 55.6 kg (122 lb 9.2 oz)  Fluid Requirements (mL): 1 mL/kcal or per MD  Fluid Need Method: RDA Method  RDA Method (mL): 1668     Nutrition Prescription Ordered  Current Diet Order: Clear Liquids  Nutrition Order Comments: Fluid - 1500mL  Current Nutrition Support Formula Ordered: (60gm AA 350gm Dextrose)  Current Nutrition Support Rate Ordered: 50 (ml)  Current Nutrition Support Frequency Ordered: 50mL/hr x 24 hrs    Evaluation of  Received Nutrient/Fluid Intake in last 24h  Parenteral Calories (kcal): 1430  Parenteral Protein (gm): 60  Parenteral Fluid (mL): 1200  Lipid Calories (kcals): 500 kcals  GIR (Glucose Infusion Rate) (mg/kg/min): 4.4 mg/kg/min  % Kcal Needs: 86  % Protein Needs: 88  I/O: +2.4L since 11/22/18  Comments: LBM 12/3/18  Tolerance: tolerating  % Intake of Estimated Energy Needs: 75 - 100 %  % Meal Intake: 25 - 50 %    Nutrition Risk  Level of Risk/Frequency of Follow-up: high(2 x week)     Assessment and Plan  NFPE 10/3/18  Severe Malnutrition in the context of Chronic Illness/Injury     Related to (etiology):  ESLD     Signs and Symptoms (as evidenced by):  Energy Intake: <75% of estimated energy requirement for 2 months  Body Fat Depletion: Severe depletion of orbitals and triceps   Muscle Mass Depletion: Severe depletion of temples, clavicle region and lower extremities   Weight Loss: 12.31% x 1 month     Nutrition Diagnosis Status:  Worsening     Monitor and Evaluation  Food and Nutrient Intake: energy intake, parenteral nutrition intake  Food and Nutrient Adminstration: enteral and parenteral nutrition administration, diet order  Knowledge/Beliefs/Attitudes: food and nutrition knowledge/skill  Physical Activity and Function: nutrition-related ADLs and IADLs  Anthropometric Measurements: weight change, weight  Biochemical Data, Medical Tests and Procedures: electrolyte and renal panel, gastrointestinal profile, glucose/endocrine profile, inflammatory profile, lipid profile  Nutrition-Focused Physical Findings: overall appearance     Nutrition Follow-Up  RD Follow-up?: Yes

## 2018-12-06 NOTE — ASSESSMENT & PLAN NOTE
- Fluctuating H&H.    - Transfusions: 10/14; 10/16; 10/18; 10/19; 10/22; 10/27; 10/29, 10/31, 11/3, 11/21, 11/29, 12/4  - FOBT +  - LDH elevated, Hapto < 10.  - EGD 10/18: ulcerated duodenal mucosa.   - Consulted GI about this ongoing issue and further need of another EGD.  - EGD 11/5 unremarkable.   - Iron studies 11/13:  95, TIBC 107, sat iron 89, transferrin 72, ferritin pending  - Hemolytic anemia workup 11/13: Haptoglobin 132, retic 3.9   - H/H dropped 12/4 - transfused 2u PRBC with good response  - No overt signs of bleeding.  - Continue to monitor.

## 2018-12-06 NOTE — PLAN OF CARE
Problem: Patient Care Overview  Goal: Plan of Care Review  Outcome: Ongoing (interventions implemented as appropriate)  AOx4, VSS, denies pain. Accuchecks Q4 - Insulin gtt continuous @ 1.9units/hr w/ SS coverage. BCG ranged in 200s this shift, coverage given when needed. TPN infusing @ 50cc/hr. Emptied VALORIE Q4 - drainage continues to be milky/orange-tinged, dressing change due 12/6. Output ranged from . Wound vac w/ 100cc output this shift. Wound dressing remains CDI, dressing change due 12/7. Vanc trough to be drawn @ 1500 prior to 1600 dose. Remains free from falls. Bed remains locked and lowered. Personal items and call light w/in reach.  present at bedside. VICTOR M BOTELLO.

## 2018-12-06 NOTE — ASSESSMENT & PLAN NOTE
Avoid insulin stacking and hypoglycemia.  Lab Results   Component Value Date    CREATININE 0.9 12/06/2018

## 2018-12-07 LAB
ALBUMIN SERPL BCP-MCNC: 2.9 G/DL
ALP SERPL-CCNC: 115 U/L
ALT SERPL W/O P-5'-P-CCNC: 21 U/L
ANION GAP SERPL CALC-SCNC: 9 MMOL/L
AST SERPL-CCNC: 23 U/L
BASOPHILS # BLD AUTO: 0.02 K/UL
BASOPHILS NFR BLD: 0.8 %
BILIRUB SERPL-MCNC: 1.7 MG/DL
BUN SERPL-MCNC: 42 MG/DL
CALCIUM SERPL-MCNC: 8.7 MG/DL
CHLORIDE SERPL-SCNC: 99 MMOL/L
CO2 SERPL-SCNC: 31 MMOL/L
CREAT SERPL-MCNC: 0.9 MG/DL
DIFFERENTIAL METHOD: ABNORMAL
EOSINOPHIL # BLD AUTO: 0.1 K/UL
EOSINOPHIL NFR BLD: 1.9 %
ERYTHROCYTE [DISTWIDTH] IN BLOOD BY AUTOMATED COUNT: 15.6 %
EST. GFR  (AFRICAN AMERICAN): >60 ML/MIN/1.73 M^2
EST. GFR  (NON AFRICAN AMERICAN): >60 ML/MIN/1.73 M^2
GLUCOSE SERPL-MCNC: 229 MG/DL
HCT VFR BLD AUTO: 30 %
HGB BLD-MCNC: 9.3 G/DL
IMM GRANULOCYTES # BLD AUTO: 0.12 K/UL
IMM GRANULOCYTES NFR BLD AUTO: 4.7 %
LYMPHOCYTES # BLD AUTO: 0.4 K/UL
LYMPHOCYTES NFR BLD: 16 %
MAGNESIUM SERPL-MCNC: 2.2 MG/DL
MCH RBC QN AUTO: 28.3 PG
MCHC RBC AUTO-ENTMCNC: 31 G/DL
MCV RBC AUTO: 91 FL
MONOCYTES # BLD AUTO: 0.5 K/UL
MONOCYTES NFR BLD: 18.7 %
NEUTROPHILS # BLD AUTO: 1.5 K/UL
NEUTROPHILS NFR BLD: 57.9 %
NRBC BLD-RTO: 0 /100 WBC
PHOSPHATE SERPL-MCNC: 3.3 MG/DL
PLATELET # BLD AUTO: 118 K/UL
PMV BLD AUTO: 9.5 FL
POCT GLUCOSE: 166 MG/DL (ref 70–110)
POCT GLUCOSE: 171 MG/DL (ref 70–110)
POCT GLUCOSE: 172 MG/DL (ref 70–110)
POCT GLUCOSE: 206 MG/DL (ref 70–110)
POCT GLUCOSE: 229 MG/DL (ref 70–110)
POCT GLUCOSE: 235 MG/DL (ref 70–110)
POTASSIUM SERPL-SCNC: 3.9 MMOL/L
PROT SERPL-MCNC: 4.9 G/DL
RBC # BLD AUTO: 3.29 M/UL
SODIUM SERPL-SCNC: 139 MMOL/L
TACROLIMUS BLD-MCNC: 8.5 NG/ML
WBC # BLD AUTO: 2.57 K/UL

## 2018-12-07 PROCEDURE — 97110 THERAPEUTIC EXERCISES: CPT

## 2018-12-07 PROCEDURE — 63600175 PHARM REV CODE 636 W HCPCS: Performed by: NURSE PRACTITIONER

## 2018-12-07 PROCEDURE — 84100 ASSAY OF PHOSPHORUS: CPT

## 2018-12-07 PROCEDURE — 25000003 PHARM REV CODE 250: Performed by: NURSE PRACTITIONER

## 2018-12-07 PROCEDURE — 94664 DEMO&/EVAL PT USE INHALER: CPT

## 2018-12-07 PROCEDURE — 80053 COMPREHEN METABOLIC PANEL: CPT

## 2018-12-07 PROCEDURE — 83735 ASSAY OF MAGNESIUM: CPT

## 2018-12-07 PROCEDURE — 99233 SBSQ HOSP IP/OBS HIGH 50: CPT | Mod: 24,,, | Performed by: PHYSICIAN ASSISTANT

## 2018-12-07 PROCEDURE — 25000003 PHARM REV CODE 250: Performed by: PHYSICIAN ASSISTANT

## 2018-12-07 PROCEDURE — 63600175 PHARM REV CODE 636 W HCPCS: Performed by: PHYSICIAN ASSISTANT

## 2018-12-07 PROCEDURE — 97116 GAIT TRAINING THERAPY: CPT

## 2018-12-07 PROCEDURE — 94799 UNLISTED PULMONARY SVC/PX: CPT

## 2018-12-07 PROCEDURE — A4217 STERILE WATER/SALINE, 500 ML: HCPCS | Performed by: PHYSICIAN ASSISTANT

## 2018-12-07 PROCEDURE — 99900035 HC TECH TIME PER 15 MIN (STAT)

## 2018-12-07 PROCEDURE — 27000646 HC AEROBIKA DEVICE

## 2018-12-07 PROCEDURE — 20600001 HC STEP DOWN PRIVATE ROOM

## 2018-12-07 PROCEDURE — 99232 SBSQ HOSP IP/OBS MODERATE 35: CPT | Mod: ,,, | Performed by: NURSE PRACTITIONER

## 2018-12-07 PROCEDURE — 85025 COMPLETE CBC W/AUTO DIFF WBC: CPT

## 2018-12-07 PROCEDURE — 80197 ASSAY OF TACROLIMUS: CPT

## 2018-12-07 PROCEDURE — 25000003 PHARM REV CODE 250: Performed by: TRANSPLANT SURGERY

## 2018-12-07 PROCEDURE — A4216 STERILE WATER/SALINE, 10 ML: HCPCS | Performed by: TRANSPLANT SURGERY

## 2018-12-07 RX ADMIN — OCTREOTIDE ACETATE 50 MCG: 100 INJECTION, SOLUTION INTRAVENOUS; SUBCUTANEOUS at 05:12

## 2018-12-07 RX ADMIN — DIBASIC SODIUM PHOSPHATE, MONOBASIC POTASSIUM PHOSPHATE AND MONOBASIC SODIUM PHOSPHATE 2 TABLET: 852; 155; 130 TABLET ORAL at 09:12

## 2018-12-07 RX ADMIN — Medication 10 ML: at 11:12

## 2018-12-07 RX ADMIN — ATOVAQUONE 1500 MG: 750 SUSPENSION ORAL at 08:12

## 2018-12-07 RX ADMIN — MAGNESIUM OXIDE TAB 400 MG (241.3 MG ELEMENTAL MG) 800 MG: 400 (241.3 MG) TAB at 08:12

## 2018-12-07 RX ADMIN — Medication 10 ML: at 05:12

## 2018-12-07 RX ADMIN — TACROLIMUS 2 MG: 1 CAPSULE ORAL at 05:12

## 2018-12-07 RX ADMIN — HEPARIN SODIUM 5000 UNITS: 5000 INJECTION, SOLUTION INTRAVENOUS; SUBCUTANEOUS at 09:12

## 2018-12-07 RX ADMIN — Medication 10 ML: at 06:12

## 2018-12-07 RX ADMIN — ASPIRIN 81 MG CHEWABLE TABLET 81 MG: 81 TABLET CHEWABLE at 08:12

## 2018-12-07 RX ADMIN — INSULIN ASPART 2 UNITS: 100 INJECTION, SOLUTION INTRAVENOUS; SUBCUTANEOUS at 09:12

## 2018-12-07 RX ADMIN — METOPROLOL TARTRATE 25 MG: 25 TABLET ORAL at 09:12

## 2018-12-07 RX ADMIN — PANTOPRAZOLE SODIUM 40 MG: 40 TABLET, DELAYED RELEASE ORAL at 09:12

## 2018-12-07 RX ADMIN — INSULIN ASPART 1 UNITS: 100 INJECTION, SOLUTION INTRAVENOUS; SUBCUTANEOUS at 05:12

## 2018-12-07 RX ADMIN — VANCOMYCIN HYDROCHLORIDE 750 MG: 750 INJECTION, POWDER, LYOPHILIZED, FOR SOLUTION INTRAVENOUS at 05:12

## 2018-12-07 RX ADMIN — DIBASIC SODIUM PHOSPHATE, MONOBASIC POTASSIUM PHOSPHATE AND MONOBASIC SODIUM PHOSPHATE 2 TABLET: 852; 155; 130 TABLET ORAL at 08:12

## 2018-12-07 RX ADMIN — PREDNISONE 2.5 MG: 2.5 TABLET ORAL at 08:12

## 2018-12-07 RX ADMIN — MAGNESIUM OXIDE TAB 400 MG (241.3 MG ELEMENTAL MG) 800 MG: 400 (241.3 MG) TAB at 09:12

## 2018-12-07 RX ADMIN — OCTREOTIDE ACETATE 50 MCG: 100 INJECTION, SOLUTION INTRAVENOUS; SUBCUTANEOUS at 01:12

## 2018-12-07 RX ADMIN — CALCIUM GLUCONATE: 94 INJECTION, SOLUTION INTRAVENOUS at 09:12

## 2018-12-07 RX ADMIN — METOPROLOL TARTRATE 25 MG: 25 TABLET ORAL at 08:12

## 2018-12-07 RX ADMIN — TACROLIMUS 2 MG: 1 CAPSULE ORAL at 08:12

## 2018-12-07 RX ADMIN — ENTECAVIR 0.5 MG: 0.5 TABLET ORAL at 08:12

## 2018-12-07 RX ADMIN — INSULIN ASPART 1 UNITS: 100 INJECTION, SOLUTION INTRAVENOUS; SUBCUTANEOUS at 01:12

## 2018-12-07 RX ADMIN — PANTOPRAZOLE SODIUM 40 MG: 40 TABLET, DELAYED RELEASE ORAL at 08:12

## 2018-12-07 RX ADMIN — OCTREOTIDE ACETATE 50 MCG: 100 INJECTION, SOLUTION INTRAVENOUS; SUBCUTANEOUS at 09:12

## 2018-12-07 NOTE — PLAN OF CARE
Problem: Physical Therapy Goal  Goal: Physical Therapy Goal  Goals to be met by: 2018    Patient will increase functional independence with mobility by performin. Supine to sit with Contact Guard Assistance - not met  2. Sit to stand transfer with Supervision using LRAD or no AD - not met  3. Bed to chair transfer with Supervision using LRAD or no AD - not met  4. Gait  x 250 feet with Supervision using LRAD or no AD - not met  5. Stand for 3 minutes with Supervision to increase activity tolerance - not met   6. Lower extremity exercise program x15 reps per handout, with independence - not met            Outcome: Ongoing (interventions implemented as appropriate)  Goals reviewed and remain appropriate. Pt progressing towards goals.    Radha Nguyen, PT, DPT   2018  [phone]

## 2018-12-07 NOTE — ASSESSMENT & PLAN NOTE
Managed per primary team  Avoid hypoglycemia  Lab Results   Component Value Date    K 3.9 12/07/2018     Lab Results   Component Value Date    BUN 42 (H) 12/07/2018     Lab Results   Component Value Date    CREATININE 0.9 12/07/2018

## 2018-12-07 NOTE — PROGRESS NOTES
Follow up on chevron wound and Vac change  Wound depth is 0.5cm to both wounds. Will discuss with team as we may decide to begin topical dressing with next change. Utilized black granufoam to right lateral wound and white foam to tracking on medial . Bridged both wound together and achieved a good seal with 125mmHg suction.      12/07/18 1200       Wound 11/27/18 1300 Other (comment) transverse Abdomen   Date First Assessed/Time First Assessed: 11/27/18 1300   Pre-existing: No  Primary Wound Type: (c) Other (comment)  Side: Right  Orientation: transverse  Location: Abdomen   Wound Image      Wound WDL ex   Dressing Appearance Clean;Intact   Drainage Amount Small   Drainage Characteristics/Odor Serous;No odor   Appearance Red;Moist;Granulating;Yellow;Fibrin  (fibrinous slough in medial wound opening)   Tissue loss description Full thickness   Red (%), Wound Tissue Color 80 %   Yellow (%), Wound Tissue Color 20 %  (yellow fibrin in wound opening)   Periwound Area Intact   Wound Edges Open   Wound Length (cm) 0.6 cm  (0.2)   Wound Width (cm) 2.5 cm  (1.2)   Wound Depth (cm) 0.5 cm  (0.5 medial wound)   Wound Volume (cm^3) 0.75 cm^3   Wound Surface Area (cm^2) 1.5 cm^2   Tunneling (depth (cm)/location) 0.7 laterally and 0.4cm medially   Care Cleansed with:;Sterile normal saline   Dressing Applied  (NPWT dressing)       Negative Pressure Wound Therapy    Placement Date/Time: 11/24/18 1000   Orientation: transverse  Location: Abdomen   NPWT Type Vacuum Therapy   Therapy Setting NPWT Continuous therapy   Pressure Setting NPWT 125 mmHg   Therapy Interventions NPWT Dressing changed   Sponges Inserted NPWT White;Black;1   Sponges Removed NPWT Black;White;1     Urban Patel RN CWON  [id number]

## 2018-12-07 NOTE — PLAN OF CARE
Problem: Patient Care Overview  Goal: Plan of Care Review  Outcome: Ongoing (interventions implemented as appropriate)  VSS. Call light and personal items in reach. Transitional insulin increased to 2.3ml/hr. Still on a clear liquid diet. TPN running @ 50ml/hr. No issues today. WCTM.

## 2018-12-07 NOTE — SUBJECTIVE & OBJECTIVE
Interval History:   Overnight events: No acute events overnight. BG decreased from 226-206. BG levels are now above goal at 1.9 units/hr transition drip with low correction scale. Patient has been changed to clear liquid diet. Wound vac remains in place.      Eatin%  Nausea: No  Hypoglycemia and intervention: No  Fever: No  TPN: Yes  If yes, type ofTPN and rate: 50cc/hr      Review of patient's allergies indicates:  No Known Allergies      Objective:     Vital Signs (Most Recent):  Temp: 98.3 °F (36.8 °C) (18 1208)  Pulse: 75 (18 0800)  Resp: 13 (18 0800)  BP: (!) 119/56 (18 0800)  SpO2: (!) 94 % (18 08)  O2 Device (Oxygen Therapy): room air (18 1720) Vital Signs (24h Range):  Temp:  [98 °F (36.7 °C)-98.7 °F (37.1 °C)] 98.3 °F (36.8 °C)  Pulse:  [74-94] 75  Resp:  [13-24] 13  SpO2:  [94 %-100 %] 94 %  BP: (117-136)/(56-78) 119/56     Weight: 56 kg (123 lb 7.3 oz) (18 0611)  Body mass index is 21.87 kg/m².  Body surface area is 1.58 meters squared.    I/O last 3 completed shifts:  In: 1527.1 [P.O.:180; I.V.:92.1; IV Piggyback:250]  Out: 965 [Urine:275; Drains:590; Other:100]    Physical Exam     Constitutional: Well developed, well nourished, NAD.  Lungs: Normal effort;   Psychiatric: Good judgement and insight; normal mood and affect.        Significant Labs:  BMP:   Recent Labs   Lab 18  0530   *   CL 99   CO2 31*   BUN 42*   CREATININE 0.9   CALCIUM 8.7   MG 2.2     All labs within the past 24 hours have been reviewed.

## 2018-12-07 NOTE — ASSESSMENT & PLAN NOTE
Managed per primary team  Avoid hypoglycemia  Lab Results   Component Value Date    ALT 21 12/07/2018    AST 23 12/07/2018     (H) 10/08/2018    ALKPHOS 115 12/07/2018    BILITOT 1.7 (H) 12/07/2018

## 2018-12-07 NOTE — ASSESSMENT & PLAN NOTE
BG goal 140-180     Continue transition drip at rate of 2.3 with Low SQCorrection scale.  POC glucose q4 hr given finger sticks  ** Please call Endocrine for any BG related issues **          Discharge planning:  [place]

## 2018-12-07 NOTE — PT/OT/SLP PROGRESS
Physical Therapy Treatment    Patient Name:  Scarlett Reddy   MRN:  [id number]    Recommendations:     Discharge Recommendations:  home health PT, home health OT   Discharge Equipment Recommendations: walker, rolling   Barriers to discharge: None    Assessment:     Scarlett Reddy is a 68 y.o. female admitted with a medical diagnosis of Liver transplanted.  She presents with the following impairments/functional limitations:  weakness, gait instability, impaired endurance, impaired balance, impaired self care skills, impaired functional mobilty, impaired skin, edema. Pt able to transfer sit>stand with less assist needed this session. Pt also able to progress gait distance and with improved mechanics noted throughout. Continues to demo impaired endurance and generalized weakness, limiting further progression of mobility. Pt would continue to benefit from skilled acute PT in order to address current deficits and progress functional mobility.     Rehab Prognosis:  good; patient would benefit from acute skilled PT services to address these deficits and reach maximum level of function.      Recent Surgery: Procedure(s) (LRB):  ANGIOGRAM-HEPATIC (Left) 18 Days Post-Op    Plan:     During this hospitalization, patient to be seen 4 x/week to address the above listed problems via gait training, therapeutic activities, therapeutic exercises, neuromuscular re-education  · Plan of Care Expires:  12/15/18   Plan of Care Reviewed with: patient    Subjective     Communicated with RN prior to session.  Patient found [facility] upon PT entry to room, agreeable to treatment.      Chief Complaint: none noted   Pain/Comfort:  · Pain Rating 1: 0/10    Patients cultural, spiritual, Judaism conflicts given the current situation: none noted    Objective:     Patient found with: telemetry, wound vac, pulse ox (continuous), VALORIE drain, PICC line     General Precautions: Standard, fall, contact   Orthopedic Precautions:N/A   Braces: N/A     Functional  Mobility:  · Transfers:    · Sit to Stand:  minimum assistance with rolling walker  · Gait: 240 ft. with RW and CGA  · Demo'd decreased step length, decreased giuseppe, impaired weight-shifting ability, decreased toe-floor clearance; however, gait mechanics improving compared to previous sessions      AM-PAC 6 CLICK MOBILITY  Turning over in bed (including adjusting bedclothes, sheets and blankets)?: 3  Sitting down on and standing up from a chair with arms (e.g., wheelchair, bedside commode, etc.): 3  Moving from lying on back to sitting on the side of the bed?: 3  Moving to and from a bed to a chair (including a wheelchair)?: 3  Need to walk in hospital room?: 3  Climbing 3-5 steps with a railing?: 2  Basic Mobility Total Score: 17       Therapeutic Activities and Exercises:     present to assist with translation throughout.   Performed BLE therex x15 reps each: AP, LAQ, hip flex.  Performed BUE therex x15 reps each: elbow flex/ext, forward punches with therapists' hands as target  Pt instructed to continue performing therex (I) daily. Pt v/u.   Pt educated on ambulating with staff assist at this time due to lines. Pt v/u. RN notified.       Patient left up in chair with all lines intact, call button in reach, RN notified and  present..    GOALS:   Multidisciplinary Problems     Physical Therapy Goals        Problem: Physical Therapy Goal    Goal Priority Disciplines Outcome Goal Variances Interventions   Physical Therapy Goal     PT, PT/OT Ongoing (interventions implemented as appropriate)     Description:  Goals to be met by: 2018    Patient will increase functional independence with mobility by performin. Supine to sit with Contact Guard Assistance - not met  2. Sit to stand transfer with Supervision using LRAD or no AD - not met  3. Bed to chair transfer with Supervision using LRAD or no AD - not met  4. Gait  x 250 feet with Supervision using LRAD or no AD - not met  5. Stand  for 3 minutes with Supervision to increase activity tolerance - not met   6. Lower extremity exercise program x15 reps per handout, with independence - not met                             Time Tracking:     PT Received On: 12/07/18  PT Start Time: 1128     PT Stop Time: 1151  PT Total Time (min): 23 min     Billable Minutes: Gait Training 15 and Therapeutic Exercise 8    Treatment Type: Treatment  PT/PTA: PT     PTA Visit Number: 0     Radha Nguyen PT, DPT   12/7/2018  [phone]

## 2018-12-07 NOTE — ASSESSMENT & PLAN NOTE
Avoid insulin stacking and hypoglycemia.  Lab Results   Component Value Date    CREATININE 0.9 12/07/2018

## 2018-12-07 NOTE — PROGRESS NOTES
Update  SW met with pt, pt's spouse Andrés, and [place]  Dunia in room to f/u for continuity of care.  Pt and spouse deny coping issues at this time.  Pt reports only complaint is pain at site of injections on pt's abdomen but denies any mental health or psychosocial difficulties.  SW visually confirmed pt's [employer] home wound vac has been delivered and is ready for placement when pt is ready to d/c.  Pt and spouse deny having any questions or concerns.  SW remains available for continued psychosocial support and discharge planning and stated plans of f/u as needed.

## 2018-12-07 NOTE — PROGRESS NOTES
[facility]  Liver Transplant  Progress Note    Patient Name: Scarlett Reddy  MRN: [id number]  Admission Date: 10/1/2018  Hospital Length of Stay: 66 days  Code Status: Full Code  Primary Care Provider: Primary Doctor No  Post-Operative Day: 63    ORGAN:   LIVER  Disease Etiology: Cirrhosis: Type B and D  Donor Type:    - Brain Death  CDC High Risk:   No  Donor CMV Status:   Donor CMV Status: Positive  Donor HBcAB:   Negative  Donor HCV Status:   Negative  Donor HBV SETH: Negative  Donor HCV SETH: Negative  Whole or Partial: Whole Liver  Biliary Anastomosis: End to End  Arterial Anatomy: Replaced Left Hepatic and Right Hepatic  Subjective:     History of Present Illness:  Ms. Reddy is a 69 y/o female with PMH of ESLD secondary Hep B and D.  Listed for liver transplant with MELD 23.  Paracentesis 10/1 with 5L removed, no fluid sent for analysis.  Morning labs significant for hyponatremia, Na 119.  Pt admitted to LTS for sodium monitoring.      Hospital Course: 69 y/o F h/o HBV/delta cirrhosis s/p DBDLT 10/5/18 (CMV D+/R+, steroid induction, MMF/tacro/pred maintenance) with take back on 10/6 2/2 hyperbilirubinema and bilious VALORIE drainage, no leak identified. Post-op course c/b hypercapneic and hypoxic respiratory failure and was reintubated though quickly extubated now doing well. Liver bx (10/6) sig for Zone 3 hemorrhage and collapse, in addition to cholestasis. Transferred from ICU on POD #4. ID consulted to comment on positive diphtheroid culture from ascitic fluid (likely skin colonization) as well as ESBL Klebsiella pneumoniae in urine culture (10/4).  Pt asymptomatic and cell count from ascitic fluid unremarkable. Per ID recs, no need to treat diphtheroids or asymptomatic ESBL Kleb pneumo bacteriuria though pt has had 6 days of therapy which would cover these organisms. Mild peritransplant ascites- repeat US 10/6 with increased arterial resistive indices. Repeat US 10/9 w/ continued  elevation in RIs and fluid collections. LFTs trending down nicely.     Acute gradual worsening of renal function s/p OLTx. Creatinine on arrival 0.8 and has been up trending. Nephrology consulted for post-op EVA. Cr cont to trend up but remains to have good UOP. Had stable response to lasiz diuresis.  BUN elevated requiring multiple rounds of dialysis (10/15, 10/17, 10/20, 10/31). No significant improvements in kidney function noted.   In addition, pt H/H cont to fluctuate requiring multiple blood transfusions (10/14, 10/16, 10/18, 10/19, 10/22, 10/27, 10/29).  Pt reported dark stools 10/15 but color has normalized. FOBT+. EGD 10/18 consistent with ulcerated mucosa in duodenum s/p coagulation. Concern for H. Pylori. Started Amoxicillin/levofloxacin (10/19, complete 11/2)). Remains on Protonix 40 mg BID. Biopsy negative for infection and CMV. Will cont close monitoring H/H, transfuse as needed with goal Hb > 7.0. Of note, Urine cx + Klebsiella pneumoniae (10/13). Received 3 day course of ciprofloxacin, dc'd 10/19.   On 10/19 pm pt c/o crushing chest pain. Troponin 0.094, likely 2/2 ischemic stress. EKG NSR.   Liver US 11/6 showed no arterial flow. Paracentesis 11/7 with 8.9 L removed. Wbc 39, segs 16%. Gram stain no organisms seen + no WBC. Pt feels better since having fluid removed but she continues to have worsening abdominal distention & LE edema.Repeat Liver US 11/8 showed arterial flow with increased RIs. Paracentesis 11/12 with 8.6L removed (WBC 34, segs 25%). 11/15 liver u/s showed abnormally decreased main hepatic artery peak systolic velocity with slow arterial flow and abnormal tardus parvus waveforms intrahepatically. Consulted vascular surgery. Repeat liver US 11/19 showed no arterial flow on R and sluggish flow on L. Hepatic angiogram 11/19 confirmed HAT.  Friday 11/16, purulent drainage noted from middle of chevron incision. Area opened and packed. Cx grew klebsiella p. CT scan showed no signs of  infection. On Monday 11/18, it was noted that the right/lateral side of the chevron had 3 small openings draining purulent fluid. Opened the area and packed. Vac placed on 11/24. Wound care following. CT abd/pelvis 11/3 showed small volume abdominal ascites, improved compared to prior, pelvis ascites unchanged from prior, unchanged b/l pleural effusions (large on R, moderate on L) with bibasilar compressive atelectasis and consolidative changes. Patient finished day 7 of 7 of Ertapenem for kleb pneumo ESBL UTI 11/29. Of note, pt grew same bacteria from wound 11/16. Liver US to assess recanalization post HAT 12/4 -> conclusion that pt may recanalize. Will monitor closely with no further intervention planned at this time.    Interval history: No acute events overnight. TPN + octreotide started 12/3. Pt feeling well today. Peritoneal fluid (perc drain) continues to improve in color w/ TPN.  Will continue w/ TPN + clear liquid diet. If drain output continues to improve in appearance and quantity, will consider trying low fat diet in addition to TPN within the next few days. Cr fx and LFTs stable. Will continue to monitor. H/H stable.  Yesterday decreased heparin frequency to BID from TID. Pt cont to c/o prolonged bleeding + hematomas at injection sites. Discussed need for continuing DVT prophylaxis with patient and she expressed understanding. Pt ambulating with assistance and OOBTC often. Peritoneal fluid 11/29 w/ enterococcus. Per ID, treating with Vanc x 10 days and then repeat peritoneal fluid cx. Wound vac changed 12/7, w/ improvements noted.  Per wound care, will stop vac next dressing change because wound depth has improved to 0.5 cm.      Scheduled Meds:   aspirin  81 mg Oral Daily    atovaquone  1,500 mg Oral Daily    entecavir  0.5 mg Oral Daily    heparin (porcine)  5,000 Units Subcutaneous Q12H    k phos di & mono-sod phos mono  2 tablet Oral BID    magnesium oxide  800 mg Oral BID    metoprolol  tartrate  25 mg Oral BID    octreotide  50 mcg Subcutaneous Q8H    pantoprazole  40 mg Oral BID    sodium chloride 0.9%  10 mL Intravenous Q6H    tacrolimus  2 mg Oral BID    vancomycin (VANCOCIN) IVPB  750 mg Intravenous Q24H     Continuous Infusions:   insulin (HUMAN R) infusion (adults) 1.9 Units/hr (12/06/18 2023)    TPN ADULT CENTRAL LINE CUSTOM 50 mL/hr at 12/06/18 2027    TPN ADULT CENTRAL LINE CUSTOM       PRN Meds:sodium chloride, sodium chloride, sodium chloride, acetaminophen, bisacodyl, calcium carbonate, dextrose 50%, dextrose 50%, glucagon (human recombinant), glucose, glucose, insulin aspart U-100, omnipaque, ondansetron, ondansetron, polyethylene glycol, Flushing PICC Protocol **AND** sodium chloride 0.9% **AND** sodium chloride 0.9%, sodium chloride 0.9%, traMADol    Review of Systems   Gastrointestinal: Abdominal pain: improving.     Objective:     Vital Signs (Most Recent):  Temp: 98 °F (36.7 °C) (12/06/18 2023)  Pulse: 75 (12/07/18 0800)  Resp: 13 (12/07/18 0800)  BP: (!) 119/56 (12/07/18 0800)  SpO2: (!) 94 % (12/07/18 0800) Vital Signs (24h Range):  Temp:  [98 °F (36.7 °C)-98.7 °F (37.1 °C)] 98 °F (36.7 °C)  Pulse:  [74-94] 75  Resp:  [13-24] 13  SpO2:  [94 %-100 %] 94 %  BP: (117-136)/(56-78) 119/56     Weight: 56 kg (123 lb 7.3 oz)  Body mass index is 21.87 kg/m².    Intake/Output - Last 3 Shifts       12/05 0700 - 12/06 0659 12/06 0700 - 12/07 0659 12/07 0700 - 12/08 0659    P.O. 120 60     I.V. (mL/kg) 47.5 (0.9) 44.6 (0.8)     Blood       Other 0      IV Piggyback  250      550     Total Intake(mL/kg) 622.5 (11.2) 904.6 (16.2)     Urine (mL/kg/hr) 0 (0) 275 (0.2)     Emesis/NG output 0      Drains 370 310     Other 100      Stool 0      Blood 0      Total Output 470 585     Net +152.5 +319.6            Urine Occurrence 5 x      Stool Occurrence 0 x      Emesis Occurrence 0 x              Laboratory:  Immunosuppressants         Stop Route Frequency     tacrolimus capsule 2 mg       -- Oral 2 times daily        CBC:   Recent Labs   Lab 12/07/18  0530   WBC 2.57*   RBC 3.29*   HGB 9.3*   HCT 30.0*   *   MCV 91   MCH 28.3   MCHC 31.0*     CMP:   Recent Labs   Lab 12/07/18  0530   *   CALCIUM 8.7   ALBUMIN 2.9*   PROT 4.9*      K 3.9   CO2 31*   CL 99   BUN 42*   CREATININE 0.9   ALKPHOS 115   ALT 21   AST 23   BILITOT 1.7*     Coagulation: No results for input(s): PT, INR, APTT in the last 168 hours.  Labs within the past 24 hours have been reviewed.    Diagnostic Results:  I have personally reviewed all pertinent imaging studies.Scheduled Meds:   aspirin  81 mg Oral Daily    atovaquone  1,500 mg Oral Daily    entecavir  0.5 mg Oral Daily    heparin (porcine)  5,000 Units Subcutaneous Q8H    k phos di & mono-sod phos mono  2 tablet Oral BID    magnesium oxide  800 mg Oral BID    metoprolol tartrate  25 mg Oral BID    octreotide  50 mcg Subcutaneous Q8H    pantoprazole  40 mg Oral BID    predniSONE  2.5 mg Oral Daily    sodium chloride 0.9%  10 mL Intravenous Q6H    tacrolimus  2 mg Oral BID    vancomycin (VANCOCIN) IVPB  1,000 mg Intravenous Q24H     Continuous Infusions:   insulin (HUMAN R) infusion (adults) 1.9 Units/hr (12/05/18 1434)    TPN ADULT CENTRAL LINE CUSTOM 50 mL/hr at 12/05/18 2127     PRN Meds:sodium chloride, sodium chloride, sodium chloride, acetaminophen, bisacodyl, calcium carbonate, dextrose 50%, dextrose 50%, glucagon (human recombinant), glucose, glucose, insulin aspart U-100, omnipaque, ondansetron, ondansetron, polyethylene glycol, Flushing PICC Protocol **AND** sodium chloride 0.9% **AND** sodium chloride 0.9%, sodium chloride 0.9%, traMADol    Review of Systems   Constitutional: Positive for activity change. Negative for chills, fatigue and fever.   HENT: Negative for congestion, facial swelling and trouble swallowing.    Eyes: Negative for pain, discharge and visual disturbance.   Respiratory: Negative for cough, chest tightness,  shortness of breath and wheezing.    Cardiovascular: Positive for leg swelling. Negative for chest pain and palpitations.   Gastrointestinal: Positive for abdominal distention (much improved) and abdominal pain (improving). Negative for constipation, diarrhea, nausea and vomiting.   Genitourinary: Negative for decreased urine volume, difficulty urinating and dysuria.   Musculoskeletal: Positive for back pain.Negative for arthralgias and neck stiffness.   Skin: Positive for wound.   Allergic/Immunologic: Positive for immunocompromised state.   Neurological: Positive for weakness. Negative for tremors and headaches.   Psychiatric/Behavioral: Negative for confusion and agitation.     Objective:     Vital Signs (Most Recent):  Temp: 98.5 °F (36.9 °C) (12/06/18 0758)  Pulse: 96 (12/06/18 0800)  Resp: 17 (12/06/18 0800)  BP: 121/70 (12/06/18 0800)  SpO2: 97 % (12/06/18 0800) Vital Signs (24h Range):  Temp:  [98.3 °F (36.8 °C)-98.5 °F (36.9 °C)] 98.5 °F (36.9 °C)  Pulse:  [83-97] 96  Resp:  [11-31] 17  SpO2:  [93 %-97 %] 97 %  BP: (113-142)/(66-91) 121/70     Weight: 55.6 kg (122 lb 9.2 oz)  Body mass index is 21.71 kg/m².    Intake/Output - Last 3 Shifts       12/04 0700 - 12/05 0659 12/05 0700 - 12/06 0659 12/06 0700 - 12/07 0659    P.O. 300 120     I.V. (mL/kg) 45.9 (0.8) 47.5 (0.9) 2.8 (0)    Blood 540      Other 0 0     IV Piggyback 250      TPN 1027.8 455 72.5    Total Intake(mL/kg) 2163.7 (39.2) 622.5 (11.2) 75.3 (1.4)    Urine (mL/kg/hr) 1150 (0.9) 0 (0)     Emesis/NG output 0 0     Drains 500 370 90    Other 0 100     Stool 0 0     Blood 0 0     Total Output 1650 470 90    Net +513.7 +152.5 -14.7           Urine Occurrence 3 x 5 x     Stool Occurrence 0 x 0 x     Emesis Occurrence 0 x 0 x           Physical Exam   Constitutional: She is oriented to person, place, and time. She appears well-developed. No distress.   Temporal and distal extremity muscle wasting   HENT:   Head: Normocephalic and atraumatic.   Eyes:  EOM are normal. No scleral icterus.   Neck: Normal range of motion.   Cardiovascular: Normal rate, regular rhythm and normal heart sounds.   No murmur heard.  Pulmonary/Chest: Effort normal. No respiratory distress. She has no wheezes.   Breath sounds decreased at bases   Abdominal: Soft. Bowel sounds are normal. She exhibits distension. There is tenderness. There is no guarding.   Wound vac placed to middle chevron incision + R lateral side, no s/s/i. Percutaneous drain in place RUQ, no s/s/i.   RLQ VALORIE drain sites c/d/i with sutures   Small areas of induration noted lower abdomen - likely hematomas 2/2 heparin injections.  Musculoskeletal: Normal range of motion. She exhibits edema (1+ BLE, + generalized).   Neurological: She is alert and oriented to person, place, and time.   Skin: Skin is warm. She is not diaphoretic.   Psychiatric: She has a normal mood and affect.   Nursing note and vitals reviewed.      Laboratory:  Immunosuppressants         Stop Route Frequency     tacrolimus capsule 2 mg      -- Oral 2 times daily        CBC:   Recent Labs   Lab 12/06/18  0605   WBC 2.93*   RBC 3.53*   HGB 10.1*   HCT 31.7*   *   MCV 90   MCH 28.6   MCHC 31.9*     CMP:   Recent Labs   Lab 12/06/18  0605   *   CALCIUM 8.8   ALBUMIN 2.8*   PROT 5.0*      K 3.8   CO2 34*   CL 98   BUN 45*   CREATININE 0.9   ALKPHOS 119   ALT 28   AST 31   BILITOT 1.7*     Labs within the past 24 hours have been reviewed.    Diagnostic Results:  I have personally reviewed all pertinent imaging studies.Scheduled Meds:   aspirin  81 mg Oral Daily    atovaquone  1,500 mg Oral Daily    entecavir  0.5 mg Oral Daily    heparin (porcine)  5,000 Units Subcutaneous Q8H    k phos di & mono-sod phos mono  2 tablet Oral BID    magnesium oxide  800 mg Oral BID    metoprolol tartrate  25 mg Oral BID    octreotide  50 mcg Subcutaneous Q8H    pantoprazole  40 mg Oral BID    predniSONE  2.5 mg Oral Daily    sodium chloride 0.9%   10 mL Intravenous Q6H    tacrolimus  2 mg Oral BID    vancomycin (VANCOCIN) IVPB  1,000 mg Intravenous Q24H     Continuous Infusions:   insulin (HUMAN R) infusion (adults) 1.9 Units/hr (12/05/18 1434)    TPN ADULT CENTRAL LINE CUSTOM 50 mL/hr at 12/05/18 2127     PRN Meds:sodium chloride, sodium chloride, sodium chloride, acetaminophen, bisacodyl, calcium carbonate, dextrose 50%, dextrose 50%, glucagon (human recombinant), glucose, glucose, insulin aspart U-100, omnipaque, ondansetron, ondansetron, polyethylene glycol, Flushing PICC Protocol **AND** sodium chloride 0.9% **AND** sodium chloride 0.9%, sodium chloride 0.9%, traMADol    Review of Systems   Gastrointestinal: Abdominal pain: improving.     Objective:     Vital Signs (Most Recent):  Temp: 98.5 °F (36.9 °C) (12/06/18 0758)  Pulse: 96 (12/06/18 0800)  Resp: 17 (12/06/18 0800)  BP: 121/70 (12/06/18 0800)  SpO2: 97 % (12/06/18 0800) Vital Signs (24h Range):  Temp:  [98.3 °F (36.8 °C)-98.5 °F (36.9 °C)] 98.5 °F (36.9 °C)  Pulse:  [83-97] 96  Resp:  [11-31] 17  SpO2:  [93 %-97 %] 97 %  BP: (113-142)/(66-91) 121/70     Weight: 55.6 kg (122 lb 9.2 oz)  Body mass index is 21.71 kg/m².    Intake/Output - Last 3 Shifts       12/04 0700 - 12/05 0659 12/05 0700 - 12/06 0659 12/06 0700 - 12/07 0659    P.O. 300 120     I.V. (mL/kg) 45.9 (0.8) 47.5 (0.9) 2.8 (0)    Blood 540      Other 0 0     IV Piggyback 250      TPN 1027.8 455 72.5    Total Intake(mL/kg) 2163.7 (39.2) 622.5 (11.2) 75.3 (1.4)    Urine (mL/kg/hr) 1150 (0.9) 0 (0)     Emesis/NG output 0 0     Drains 500 370 90    Other 0 100     Stool 0 0     Blood 0 0     Total Output 1650 470 90    Net +513.7 +152.5 -14.7           Urine Occurrence 3 x 5 x     Stool Occurrence 0 x 0 x     Emesis Occurrence 0 x 0 x           Physical Exam      Laboratory:  Immunosuppressants         Stop Route Frequency     tacrolimus capsule 2 mg      -- Oral 2 times daily        CBC:   Recent Labs   Lab 12/06/18  0605   WBC 2.93*  "  RBC 3.53*   HGB 10.1*   HCT 31.7*   *   MCV 90   MCH 28.6   MCHC 31.9*     CMP:   Recent Labs   Lab 12/06/18  0605   *   CALCIUM 8.8   ALBUMIN 2.8*   PROT 5.0*      K 3.8   CO2 34*   CL 98   BUN 45*   CREATININE 0.9   ALKPHOS 119   ALT 28   AST 31   BILITOT 1.7*     Coagulation: No results for input(s): PT, INR, APTT in the last 168 hours.  Labs within the past 24 hours have been reviewed.    Diagnostic Results:  I have personally reviewed all pertinent imaging studies.    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   - POD 1 US: increased arterial resistive indices, suggestive of edema vs acute rejection vs congestion.  - Repeat US 10/9 with continued elevation of RIs.  - LFTs stable.  - T bili trending down.   - Repeat liver US (10/11): elevated RIs.  - Liver US 11/6 to assess for ascites. No arterial flow within the liver allograft concerning for interval thrombosis/occlusion of the arterial system. Severe ascites seen.  - Liver US 11/8: arterial flow seen w/ elevated RIs   - LFTs remain stable. No CTA at this time due to EVA and normal liver function.   - Liver US 11/15 showed "abnormally decreased main hepatic artery peak systolic velocity with slow arterial flow and abnormal tardus parvus waveforms intrahepatically."   -Vascular surgery consulted  - Liver US 11/19 showed no arterial flow on the right and sluggish flow on the left.   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 No further intervention planned at this time as patient's LFTs are stable. Will monitor.  - CT abd/pelvis 11/26 showed: 1. Persistent large volume of ascites. 2. Large right and moderate left pleural effusions with adjacent basilar atelectasis. 3. Anasarca  - IR placed perc drain 11/29 due to suspicion for chyle leak. 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  - Fluid also growing enterococcus and will plan for treatment with Vanc x 10 days per ID recs. Will need repeat fluid cx post treatment.       Hypoalbuminemia    - Continue to monitor.     Sequelae of hyperalimentation    - Monitor.       Tachycardia    - Pt w/ tachycardia but now improved.   - Metoprolol 12.5 mg BID for rate control. HR improved, but still elevated. BP stable.  - 11/30 -> increased Metoprolol to 25 mg BID   - Monitor closely.       Pleural effusion    - Common post liver transplant  - CT 11/26 showed: Large right and moderate left pleural effusions with adjacent basilar atelectasis.  - Pt sp02 stable on RA, but c/o some orthopnea.  - CT abd/pelvis 12/3 showed unchanged b/l pleural effusions (large on R, moderate on L) with bibasilar compressive atelectasis and consolidative changes.  - Sp02 stable & no signs of respiratory infection. Will continue to monitor.       Chronic kidney disease, stage III (moderate)    - Kidney fx improving  - Monitor       Hypomagnesemia    - Continue PO Mag Oxide  - Continue to monitor.       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   - Continue to monitor.     Hypophosphatemia    - Continue k phos and monitor closely.       Delayed surgical wound healing    - Middle chevron incision opened 11/16 with purulent drainage  - Packed with Aquacel ag at this time. Growing Klebsiella p.   - CT abd/pelvis 11/17- no signs of infection  - 11/17: R side of chevron incision w/ 3 small holes draining purulent fluid, opened and packed w/ aquacel.   - Wound care following.  - Wound w/ increased drainage, so wound care placed vac 11/24/18.  Apprec recs. Extending Ertapenem thru 11/28 per ID.    - Abdomen erythematous and indurated RLQ inferior to chevron.  - CT abd/pelvis 11/26 - no sign of infection  - Last wound  "vac change 12/7 with improvements noted in wound - depth now 0.5cm, so vac to be d/c'd at next dressing change, per wound care.    .       Hepatic artery thrombosis of transplanted liver    - Liver US 11/19 showed no arterial flow to R and sluggish flow to L  - Hepatic angiogram 11/19 confirmed HAT  - Liver US 12/4 to evaluate for recanalization reviewed. Recanalization possible. Will continue to monitor.  - No further intervention planned at this time. Will continue to monitor.     Other drug-induced pancytopenia    - Decreased plt, wbc, h/h  - See "leukopenia"  - Continue to monitor.     Thrombocytopenia, unspecified    - Monitor.       Alteration in skin integrity    - Monitor.       Leukopenia    - Stopped Cellcept, Bactrim, and Valcyte 10/31   - Cellcept half dose resumed 11/6  - Started Atovaquone 11/6.   - CMV PCR noted to be positive at 129. Will hold off on treatment with valcyte at this time. Monitoring weekly CMV PCRs.  - Cellcept held 11/23 2/2 infection (UTI + wound)  - Continue to monitor.       Azotemia    - CRRT 10/31 for uremic toxin clearance.           UTI (urinary tract infection)    - Urine Cx 10/15 w/ Klebsiella pneumonia.  - Started Ciprofloxacin (10/17-10/19).   - Started on amoxicillin/levo for H. Pylori (completed abx 11/2).  - Pt c/o lower back pain 11/22. UA w/ trace leuks, moderate wbs, 1+ blood, cx pending  - Pt symptomatic (back pain) & with ESBL in wound previously.  Ertapenem 1g QD x 3 days (started 11/23), per ID recs.    - Given pt cont to have back pain and concern for infection in wound ( thick drainage and increased drainage)-  extended Ertapenem for 4 more days per ID (stop date 11/29/18).         Acute blood loss anemia    - Expected post transplant  - H&H cont to fluctuate.   - FOBT +.  - EGD 10/18 - showed diffuse bleeding with ulcerated duodenal mucosa.  - EGD 11/5 - improvement seen, no active bleeding.  - Resume heparin sq injections 11/6.   - Decrease heparin sq from TID " "to BID 11/15   - Heparin dc'd 11/17 then resumed a few days later 2/2 risk vs. Benefit  - Pt continues to have prolonged bleeding from heparin injection sites, decreased frequency to q12 from q8 as pt has been ambulating and up in chair frequently.  - See "anemia requiring transfusions."  - Continue to monitor.             Post LT - Acute renal failure    - Creatinine on arrival 0.8 and trended up.  - Nephrology consulted.  - Renal US (10/11): no hydronephrosis.  - Echo 10/12: diastolic dysfunction, likely 2/2 to ARF.  - CRRT 10/14; SLED 10/17, 10/20. --> no improvements noted in kidney fxn.  - 24 hr urine creatine collection: CrCl of 7.   - CRRT 10/31 with 1L removed.   - Cr level now with improvement.   - See "hypervolemia associated with renal insufficiency"     Hypervolemia associated with renal insufficiency    - CRRT 10/14, 10/17, 10/20, 10/31.  - CXR 10/30: abundant fluid in b/l pleural space.  - Renal insufficiency resolved. Cr stable. Co2 elevated. D/c'd Lasix 12/5.  - Monitor daily weights.       EVA (acute kidney injury)    - No plans for RRT in near future, per nephrology  - Trialysis line removed 11/13  - Nephrology involved. Appreciate recs.  - Cr improved & stable.  - Continue to monitor.     At risk for opportunistic infections    - Valcyte for CMV prophylaxis--> holding (10/31) for leukopenia. Pt will need weekly CMV PCR while discontinued.  - CMV PCR noted to be positive at 129. .  - CMV PCR 11/8: 105  - CMV PCR 11/15 with 89 copies  - CMV PCR 11/23 95 - increased from previous. Continue to hold Valcyte & monitor pt closely.  - CMV PCR 11/29 146 - per ID, will continue to monitor w/ weekly PCRs.  - CMV PCR 12/6 pending.  - Bactrim for PCP prophylaxis ([facility]).--> holding (10/31) for leukopenia.   - Start Atovaquone 11/6.  - Fluconazole for fungal prophylaxis (first dose 10/6). D/c Fluc 11/16 bc pt had been on it for > 30 days post take back.       Long-term use of immunosuppressant medication    - " Prograf: stopped 10/29. Resumed 11/2.  --> Will monitor for signs of toxic side effects, check daily troughs, and change meds accordingly.   - Prednisone: stopped 10/29. Started on Hydrocortisone. pred taper resumed.  - Cellcept: stopped 10/31 for leukopenia. Restarted Cellcept 500 mg bid, 11/6.  - Cellcept held 11/23 2/2 infection         Prophylactic immunotherapy    - See long term use immunosuppresion.       Corticosteroids adverse reaction    - Delayed wound healing, hyperglycemia.  - Monitor.       Other ascites    - Paracentesis 10/1, 5L removed, no fluid sent for analysis.  - Pt remains volume overloaded.   - Liver US 11/6 with severe ascites.   - Paracentesis 11/7 with 8.9 L removed. Wbc 39, segs 16%. Gram stain - no organisms seen and no WBC. Cultures NGTD.  - Paracentesis 11/12: 8.6 L removed. 34 wbc, 25% segs, 33 lymphs, 42 monocytes/macrophages. Cultures NGTD  - Paracentesis 11/21 with 4 L removed, neg for infection.  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 Milky appearance, cell count of fluid (51% lymphs), triglycerides 2208, consistent with chyle leak.   - Peritoneal fluid 11/29 growing enterococcus and will plan for treatment with Vanc x 10 days per ID recs.   - Attempted low fat, high protein diet due to suspicion for chyle leak with no improvement seen in fluid character.  - CT abd/pelvis 12/3 shows small volume abdominal ascites, improved compared to prior, pelvis ascites unchanged from prior, unchanged b/l pleural effusions (large on R, moderate on L) with bibasilar compressive atelectasis and consolidative changes.  - Placed PICC line and started TPN + octreotide 12/3. Improvement in color of fluid seen 12/6 - 12/7 (less milky, more abbie). Will continue with TPN + clear liquid diet at this time.  - Continue to monitor.     Weakness    - See physical deconditioning.       Physical deconditioning    - PT/OT consulted.  Progressing well w/ therapy.  - Recommend home  health PT and rolling walker at discharge (orders placed 11/6).       Hypotension    - D/c'd midodrine 11/28 as patient has not required it recently  - Continue to monitor.     Anemia requiring transfusions    - Fluctuating H&H.    - Transfusions: 10/14; 10/16; 10/18; 10/19; 10/22; 10/27; 10/29, 10/31, 11/3, 11/21, 11/29, 12/4  - FOBT +  - LDH elevated, Hapto < 10.  - EGD 10/18: ulcerated duodenal mucosa.   - Consulted GI about this ongoing issue and further need of another EGD.  - EGD 11/5 unremarkable.   - Iron studies 11/13:  95, TIBC 107, sat iron 89, transferrin 72, ferritin pending  - Hemolytic anemia workup 11/13: Haptoglobin 132, retic 3.9   - H/H dropped 12/4 - transfused 2u PRBC with good response  - No overt signs of bleeding.  - Continue to monitor.           Severe malnutrition    - Dietary consulted.   - Temporal and distal extremity muscle wasting and edema.  - Encourage supplements and to increase nutritional intake.  - Per nephrology, pt to be on low protein diet.  - Prealbumin reviewed.   - Tolerating diet.  Denies n/v.    - Attempted low fat, high protein diet due to suspicion for chyle leak with no improvement seen in fluid character.  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 Clear liquid diet approved by surgeon (in addition to) TPN 12/5  - Monitor.         Type 2 diabetes mellitus with diabetic polyneuropathy, with long-term current use of insulin    - Continue home regimen.  - TPN started 12/3 and pt now on insulin drip.  - Endocrine following. Appreciate recs.       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  - Tx w/ Entecavir- dose changed to daily 11/26 due to improved renal fx  - 12/3 Hep B surface antigen negative 12/3, surface AB quant 571, and Hep B DNA <10.  - Continue to monitor, per protocol.         VTE Risk Mitigation (From admission, onward)        Ordered     heparin (porcine) injection 5,000 Units  Every 12  hours      12/06/18 1058     IP VTE HIGH RISK PATIENT  Once      11/05/18 1145     Place sequential compression device  Until discontinued      10/05/18 0709          The patients clinical status was discussed at multidisplinary rounds, involving transplant surgery, transplant medicine, pharmacy, nursing, nutrition, and social work    Discharge Planning:  No Patient Care Coordination Note on file.      Elizabeth Dean PA-C  Liver Transplant  [facility]

## 2018-12-07 NOTE — PROGRESS NOTES
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 Post-op course c/b hypercapneic and hypoxic respiratory failure and was reintubated though quickly extubated now doing well. Liver bx (10/6) sig for Zone 3 hemorrhage and collapse, in addition to cholestasis. Transferred from ICU on POD #4. ID consulted to comment on positive diphtheroid culture from ascitic fluid (likely skin colonization) as well as ESBL Klebsiella pneumoniae in urine culture (10/4).  Pt asymptomatic and cell count from ascitic fluid unremarkable. Per ID recs, no need to treat diphtheroids or asymptomatic ESBL Kleb pneumo bacteriuria though pt has had 6 days of therapy which would cover these organisms. Mild peritransplant ascites- repeat US 10/6 with increased arterial resistive indices. Repeat US 10/9 w/ continued elevation in RIs and fluid collections. LFTs trending down nicely.      Acute gradual worsening of renal function s/p OLTx.Creatinine on arrival 0.8 and has been up trending. Nephrology consulted for post-op EVA. Cr cont to trend up but remains to have good UOP. Had stable response to lasiz diuresis.  BUN elevated requiring multiple rounds of dialysis (10/15, 10/17, 10/20, 10/31). No significant improvements in kidney function noted.   In addition, pt H/H cont to fluctuate requiring multiple blood transfusions (10/14, 10/16, 10/18, 10/19, 10/22, 10/27, 10/29).  Pt reported dark stools 10/15 but color has normalized. FOBT+. EGD 10/18 consistent with ulcerated mucosa in duodenum s/p  coagulation. Concern for H. Pylori. Started Amoxicillin/levofloxacin (10/19, complete )). Remains on Protonix 40 mg BID. Biopsy negative for infection and CMV. Will cont close monitoring H/H, transfuse as needed with goal Hb > 7.0.  Of note, Urine cx + Klebsiella pneumoniae (10/13). Received 3 day course of ciprofloxacin, dc'd 10/19.   On 10/19 pm pt c/o crushing chest pain. Troponin 0.094, likely 2/2 ischemic stress. EKG NSR.      18 - no acute events overnight. Pt reports feeling well this am. One unit of PRBCs given 11/3 with good response. Cr level noted with only minimal increases. Lasix placed on hold per nephrology recs. Will plan to hydrate today w/ albumin. EGD  unimpressive for overt bleed. Pt remains on protonix 40 BID. Denies melena. Cont to hold heparin. WBC cont to decrease. Immunosuppression managed with hydrocortisone 50 (cont to hold Cellcept, bactrim, valcyte). Prograf now restarted and transitioned IV steroids to PO prednisone on 11/3. CMV PCR noted with 129. Cont to monitor.     Interval History:   Overnight events: No acute events overnight. BG decreased from 226-206. BG levels are now above goal at 1.9 units/hr transition drip with low correction scale. Patient has been changed to clear liquid diet. Wound vac remains in place.      Eatin%  Nausea: No  Hypoglycemia and intervention: No  Fever: No  TPN: Yes  If yes, type ofTPN and rate: 50cc/hr      Review of patient's allergies indicates:  No Known Allergies      Objective:     Vital Signs (Most Recent):  Temp: 98.3 °F (36.8 °C) (18 1208)  Pulse: 75 (18 0800)  Resp: 13 (18 0800)  BP: (!) 119/56 (18 0800)  SpO2: (!) 94 % (18 0800)  O2 Device (Oxygen Therapy): room air (18 1720) Vital Signs (24h Range):  Temp:  [98 °F (36.7 °C)-98.7 °F (37.1 °C)] 98.3 °F (36.8 °C)  Pulse:  [74-94] 75  Resp:  [13-24] 13  SpO2:  [94 %-100 %] 94 %  BP: (117-136)/(56-78) 119/56     Weight: 56 kg (123 lb 7.3 oz)  (12/07/18 0611)  Body mass index is 21.87 kg/m².  Body surface area is 1.58 meters squared.    I/O last 3 completed shifts:  In: 1527.1 [P.O.:180; I.V.:92.1; IV Piggyback:250]  Out: 965 [Urine:275; Drains:590; Other:100]    Physical Exam     Constitutional: Well developed, well nourished, NAD.  Lungs: Normal effort;   Psychiatric: Good judgement and insight; normal mood and affect.        Significant Labs:  BMP:   Recent Labs   Lab 12/07/18  0530   *   CL 99   CO2 31*   BUN 42*   CREATININE 0.9   CALCIUM 8.7   MG 2.2     All labs within the past 24 hours have been reviewed.         Assessment/Plan:     * Liver transplanted    Managed per primary team  Avoid hypoglycemia  Lab Results   Component Value Date    ALT 21 12/07/2018    AST 23 12/07/2018     (H) 10/08/2018    ALKPHOS 115 12/07/2018    BILITOT 1.7 (H) 12/07/2018          Type 2 diabetes mellitus with diabetic polyneuropathy, with long-term current use of insulin    BG goal 140-180     Change transition drip at rate of 2.3 with Low SQCorrection scale.  POC glucose q4 hr given finger sticks  ** Please call Endocrine for any BG related issues **          Discharge planning:  TBD     Chronic kidney disease, stage III (moderate)    Managed per primary team  Avoid hypoglycemia. Caution with insulin stacking     Hypokalemia    Managed per primary team  Avoid hypoglycemia  Lab Results   Component Value Date    K 3.9 12/07/2018     Lab Results   Component Value Date    BUN 42 (H) 12/07/2018     Lab Results   Component Value Date    CREATININE 0.9 12/07/2018          Prophylactic immunotherapy    May increase insulin resistance.     EVA (acute kidney injury)    Avoid insulin stacking and hypoglycemia.  Lab Results   Component Value Date    CREATININE 0.9 12/07/2018              Thank you for your consult. I will follow-up with patient. Please contact us if you have any additional questions.    Louie Merlos, KEEGAN  Endocrinology  [facility]  [place]

## 2018-12-08 PROBLEM — E87.6 HYPOKALEMIA: Status: RESOLVED | Noted: 2018-11-20 | Resolved: 2018-12-08

## 2018-12-08 PROBLEM — N17.9 AKI (ACUTE KIDNEY INJURY): Status: RESOLVED | Noted: 2018-10-11 | Resolved: 2018-12-08

## 2018-12-08 PROBLEM — E83.39 HYPOPHOSPHATEMIA: Status: RESOLVED | Noted: 2018-11-20 | Resolved: 2018-12-08

## 2018-12-08 PROBLEM — R00.0 TACHYCARDIA: Status: RESOLVED | Noted: 2018-11-29 | Resolved: 2018-12-08

## 2018-12-08 LAB
ABO + RH BLD: NORMAL
ALBUMIN SERPL BCP-MCNC: 2.9 G/DL
ALP SERPL-CCNC: 138 U/L
ALT SERPL W/O P-5'-P-CCNC: 18 U/L
ANION GAP SERPL CALC-SCNC: 11 MMOL/L
AST SERPL-CCNC: 18 U/L
BASOPHILS # BLD AUTO: 0.02 K/UL
BASOPHILS NFR BLD: 0.7 %
BILIRUB SERPL-MCNC: 1.5 MG/DL
BLD GP AB SCN CELLS X3 SERPL QL: NORMAL
BUN SERPL-MCNC: 39 MG/DL
CALCIUM SERPL-MCNC: 8.4 MG/DL
CHLORIDE SERPL-SCNC: 102 MMOL/L
CMV DNA SERPL NAA+PROBE-ACNC: 529 IU/ML
CO2 SERPL-SCNC: 29 MMOL/L
CREAT SERPL-MCNC: 0.8 MG/DL
DIFFERENTIAL METHOD: ABNORMAL
EOSINOPHIL # BLD AUTO: 0 K/UL
EOSINOPHIL NFR BLD: 1.1 %
ERYTHROCYTE [DISTWIDTH] IN BLOOD BY AUTOMATED COUNT: 15.5 %
EST. GFR  (AFRICAN AMERICAN): >60 ML/MIN/1.73 M^2
EST. GFR  (NON AFRICAN AMERICAN): >60 ML/MIN/1.73 M^2
GLUCOSE SERPL-MCNC: 107 MG/DL
HCT VFR BLD AUTO: 29.8 %
HGB BLD-MCNC: 9.3 G/DL
IMM GRANULOCYTES # BLD AUTO: 0.1 K/UL
IMM GRANULOCYTES NFR BLD AUTO: 3.7 %
LYMPHOCYTES # BLD AUTO: 0.5 K/UL
LYMPHOCYTES NFR BLD: 16.5 %
MAGNESIUM SERPL-MCNC: 2 MG/DL
MCH RBC QN AUTO: 28.4 PG
MCHC RBC AUTO-ENTMCNC: 31.2 G/DL
MCV RBC AUTO: 91 FL
MONOCYTES # BLD AUTO: 0.6 K/UL
MONOCYTES NFR BLD: 21 %
NEUTROPHILS # BLD AUTO: 1.6 K/UL
NEUTROPHILS NFR BLD: 57 %
NRBC BLD-RTO: 0 /100 WBC
PHOSPHATE SERPL-MCNC: 3.4 MG/DL
PLATELET # BLD AUTO: 121 K/UL
PMV BLD AUTO: 9.5 FL
POCT GLUCOSE: 118 MG/DL (ref 70–110)
POCT GLUCOSE: 150 MG/DL (ref 70–110)
POCT GLUCOSE: 196 MG/DL (ref 70–110)
POCT GLUCOSE: 263 MG/DL (ref 70–110)
POCT GLUCOSE: 315 MG/DL (ref 70–110)
POCT GLUCOSE: 324 MG/DL (ref 70–110)
POCT GLUCOSE: 415 MG/DL (ref 70–110)
POTASSIUM SERPL-SCNC: 3.9 MMOL/L
PROT SERPL-MCNC: 5 G/DL
RBC # BLD AUTO: 3.28 M/UL
SODIUM SERPL-SCNC: 142 MMOL/L
TACROLIMUS BLD-MCNC: 10.2 NG/ML
WBC # BLD AUTO: 2.72 K/UL

## 2018-12-08 PROCEDURE — 86850 RBC ANTIBODY SCREEN: CPT

## 2018-12-08 PROCEDURE — 25000003 PHARM REV CODE 250: Performed by: TRANSPLANT SURGERY

## 2018-12-08 PROCEDURE — 25000003 PHARM REV CODE 250: Performed by: NURSE PRACTITIONER

## 2018-12-08 PROCEDURE — 80197 ASSAY OF TACROLIMUS: CPT

## 2018-12-08 PROCEDURE — 63600175 PHARM REV CODE 636 W HCPCS: Performed by: PHYSICIAN ASSISTANT

## 2018-12-08 PROCEDURE — 99232 SBSQ HOSP IP/OBS MODERATE 35: CPT | Mod: ,,, | Performed by: NURSE PRACTITIONER

## 2018-12-08 PROCEDURE — 25000003 PHARM REV CODE 250: Performed by: PHYSICIAN ASSISTANT

## 2018-12-08 PROCEDURE — 84100 ASSAY OF PHOSPHORUS: CPT

## 2018-12-08 PROCEDURE — A4216 STERILE WATER/SALINE, 10 ML: HCPCS | Performed by: TRANSPLANT SURGERY

## 2018-12-08 PROCEDURE — 85025 COMPLETE CBC W/AUTO DIFF WBC: CPT

## 2018-12-08 PROCEDURE — 20600001 HC STEP DOWN PRIVATE ROOM

## 2018-12-08 PROCEDURE — 63600175 PHARM REV CODE 636 W HCPCS: Performed by: NURSE PRACTITIONER

## 2018-12-08 PROCEDURE — A4217 STERILE WATER/SALINE, 500 ML: HCPCS | Performed by: NURSE PRACTITIONER

## 2018-12-08 PROCEDURE — 99233 SBSQ HOSP IP/OBS HIGH 50: CPT | Mod: 24,,, | Performed by: NURSE PRACTITIONER

## 2018-12-08 PROCEDURE — 99900035 HC TECH TIME PER 15 MIN (STAT)

## 2018-12-08 PROCEDURE — 80053 COMPREHEN METABOLIC PANEL: CPT

## 2018-12-08 PROCEDURE — 83735 ASSAY OF MAGNESIUM: CPT

## 2018-12-08 RX ORDER — DIPHENHYDRAMINE HCL 25 MG
25 CAPSULE ORAL ONCE
Status: COMPLETED | OUTPATIENT
Start: 2018-12-09 | End: 2018-12-08

## 2018-12-08 RX ORDER — TACROLIMUS 1 MG/1
1 CAPSULE ORAL 2 TIMES DAILY
Status: DISCONTINUED | OUTPATIENT
Start: 2018-12-08 | End: 2018-12-16

## 2018-12-08 RX ORDER — TACROLIMUS 1 MG/1
1 CAPSULE ORAL 2 TIMES DAILY
Status: CANCELLED | OUTPATIENT
Start: 2018-12-08

## 2018-12-08 RX ORDER — FUROSEMIDE 10 MG/ML
40 INJECTION INTRAMUSCULAR; INTRAVENOUS ONCE
Status: COMPLETED | OUTPATIENT
Start: 2018-12-08 | End: 2018-12-08

## 2018-12-08 RX ADMIN — MAGNESIUM OXIDE TAB 400 MG (241.3 MG ELEMENTAL MG) 800 MG: 400 (241.3 MG) TAB at 08:12

## 2018-12-08 RX ADMIN — DIPHENHYDRAMINE HYDROCHLORIDE 25 MG: 25 CAPSULE ORAL at 11:12

## 2018-12-08 RX ADMIN — HEPARIN SODIUM 5000 UNITS: 5000 INJECTION, SOLUTION INTRAVENOUS; SUBCUTANEOUS at 08:12

## 2018-12-08 RX ADMIN — CALCIUM GLUCONATE: 94 INJECTION, SOLUTION INTRAVENOUS at 09:12

## 2018-12-08 RX ADMIN — OCTREOTIDE ACETATE 50 MCG: 100 INJECTION, SOLUTION INTRAVENOUS; SUBCUTANEOUS at 05:12

## 2018-12-08 RX ADMIN — Medication 10 ML: at 11:12

## 2018-12-08 RX ADMIN — INSULIN ASPART 4 UNITS: 100 INJECTION, SOLUTION INTRAVENOUS; SUBCUTANEOUS at 01:12

## 2018-12-08 RX ADMIN — SODIUM CHLORIDE 1.6 UNITS/HR: 9 INJECTION, SOLUTION INTRAVENOUS at 08:12

## 2018-12-08 RX ADMIN — PANTOPRAZOLE SODIUM 40 MG: 40 TABLET, DELAYED RELEASE ORAL at 08:12

## 2018-12-08 RX ADMIN — INSULIN ASPART 2 UNITS: 100 INJECTION, SOLUTION INTRAVENOUS; SUBCUTANEOUS at 09:12

## 2018-12-08 RX ADMIN — METOPROLOL TARTRATE 25 MG: 25 TABLET ORAL at 08:12

## 2018-12-08 RX ADMIN — DIBASIC SODIUM PHOSPHATE, MONOBASIC POTASSIUM PHOSPHATE AND MONOBASIC SODIUM PHOSPHATE 2 TABLET: 852; 155; 130 TABLET ORAL at 08:12

## 2018-12-08 RX ADMIN — ENTECAVIR 0.5 MG: 0.5 TABLET ORAL at 08:12

## 2018-12-08 RX ADMIN — OCTREOTIDE ACETATE 50 MCG: 100 INJECTION, SOLUTION INTRAVENOUS; SUBCUTANEOUS at 02:12

## 2018-12-08 RX ADMIN — ASPIRIN 81 MG CHEWABLE TABLET 81 MG: 81 TABLET CHEWABLE at 08:12

## 2018-12-08 RX ADMIN — OCTREOTIDE ACETATE 50 MCG: 100 INJECTION, SOLUTION INTRAVENOUS; SUBCUTANEOUS at 09:12

## 2018-12-08 RX ADMIN — TACROLIMUS 2 MG: 1 CAPSULE ORAL at 08:12

## 2018-12-08 RX ADMIN — ACETAMINOPHEN 650 MG: 325 TABLET, FILM COATED ORAL at 02:12

## 2018-12-08 RX ADMIN — INSULIN ASPART 3 UNITS: 100 INJECTION, SOLUTION INTRAVENOUS; SUBCUTANEOUS at 05:12

## 2018-12-08 RX ADMIN — HEPARIN SODIUM 5000 UNITS: 5000 INJECTION, SOLUTION INTRAVENOUS; SUBCUTANEOUS at 09:12

## 2018-12-08 RX ADMIN — FUROSEMIDE 40 MG: 10 INJECTION, SOLUTION INTRAMUSCULAR; INTRAVENOUS at 02:12

## 2018-12-08 RX ADMIN — INSULIN ASPART 1 UNITS: 100 INJECTION, SOLUTION INTRAVENOUS; SUBCUTANEOUS at 08:12

## 2018-12-08 RX ADMIN — TACROLIMUS 1 MG: 1 CAPSULE ORAL at 05:12

## 2018-12-08 RX ADMIN — VANCOMYCIN HYDROCHLORIDE 750 MG: 750 INJECTION, POWDER, LYOPHILIZED, FOR SOLUTION INTRAVENOUS at 05:12

## 2018-12-08 RX ADMIN — ATOVAQUONE 1500 MG: 750 SUSPENSION ORAL at 08:12

## 2018-12-08 RX ADMIN — Medication 10 ML: at 01:12

## 2018-12-08 RX ADMIN — Medication 10 ML: at 05:12

## 2018-12-08 NOTE — SUBJECTIVE & OBJECTIVE
Scheduled Meds:   aspirin  81 mg Oral Daily    atovaquone  1,500 mg Oral Daily    entecavir  0.5 mg Oral Daily    furosemide  40 mg Intravenous Once    heparin (porcine)  5,000 Units Subcutaneous Q12H    k phos di & mono-sod phos mono  2 tablet Oral BID    magnesium oxide  800 mg Oral BID    metoprolol tartrate  25 mg Oral BID    octreotide  50 mcg Subcutaneous Q8H    pantoprazole  40 mg Oral BID    sodium chloride 0.9%  10 mL Intravenous Q6H    tacrolimus  1 mg Oral BID    vancomycin (VANCOCIN) IVPB  750 mg Intravenous Q24H     Continuous Infusions:   insulin (HUMAN R) infusion (adults) 1.6 Units/hr (12/08/18 0904)    TPN ADULT CENTRAL LINE CUSTOM 50 mL/hr at 12/07/18 2145    TPN ADULT CENTRAL LINE CUSTOM       PRN Meds:sodium chloride, sodium chloride, sodium chloride, acetaminophen, bisacodyl, calcium carbonate, dextrose 50%, dextrose 50%, glucagon (human recombinant), glucose, glucose, insulin aspart U-100, omnipaque, ondansetron, ondansetron, polyethylene glycol, Flushing PICC Protocol **AND** sodium chloride 0.9% **AND** sodium chloride 0.9%, sodium chloride 0.9%, traMADol    Review of Systems   Constitutional: Positive for activity change and appetite change (improving). Negative for chills, fatigue and fever.   HENT: Negative for congestion, facial swelling and trouble swallowing.    Eyes: Negative for pain, discharge and visual disturbance.   Respiratory: Negative for cough, chest tightness, shortness of breath and wheezing.    Cardiovascular: Positive for leg swelling. Negative for chest pain and palpitations.   Gastrointestinal: Positive for abdominal distention and abdominal pain. Negative for constipation, diarrhea, nausea and vomiting.   Endocrine: Negative.    Genitourinary: Negative for decreased urine volume, difficulty urinating and dysuria.   Musculoskeletal: Positive for back pain.Negative for arthralgias and neck stiffness.   Skin: Positive for wound.   Allergic/Immunologic:  Positive for immunocompromised state.   Neurological: Positive for weakness. Negative for tremors and headaches.   Psychiatric/Behavioral: Negative for confusion and sleep disturbance.     Objective:     Vital Signs (Most Recent):  Temp: 98.4 °F (36.9 °C) (12/08/18 1105)  Pulse: 81 (12/08/18 0800)  Resp: 18 (12/08/18 0800)  BP: 129/74 (12/08/18 0800)  SpO2: 97 % (12/08/18 0800) Vital Signs (24h Range):  Temp:  [97.6 °F (36.4 °C)-98.4 °F (36.9 °C)] 98.4 °F (36.9 °C)  Pulse:  [77-93] 81  Resp:  [16-21] 18  SpO2:  [96 %-98 %] 97 %  BP: (117-137)/(55-79) 129/74     Weight: 55.5 kg (122 lb 5.7 oz)  Body mass index is 21.67 kg/m².    Intake/Output - Last 3 Shifts       12/06 0700 - 12/07 0659 12/07 0700 - 12/08 0659 12/08 0700 - 12/09 0659    P.O. 60 455     I.V. (mL/kg) 44.6 (0.8) 51.2 (0.9) 2.1 (0)    Other       IV Piggyback 250 250      1147.5 65    Total Intake(mL/kg) 904.6 (16.2) 1903.7 (34.3) 67.1 (1.2)    Urine (mL/kg/hr) 275 (0.2) 0 (0)     Emesis/NG output       Drains 310 300 90    Other       Stool  0     Blood       Total Output 585 300 90    Net +319.6 +1603.7 -22.9           Urine Occurrence  3 x     Stool Occurrence  0 x           Physical Exam   Constitutional: She is oriented to person, place, and time. She appears well-developed. No distress.   Temporal and distal extremity muscle wasting   HENT:   Head: Normocephalic and atraumatic.   Eyes: EOM are normal. No scleral icterus.   Neck: Normal range of motion.   Cardiovascular: Normal rate, regular rhythm and normal heart sounds.   No murmur heard.  Pulmonary/Chest: Effort normal. No respiratory distress. She has no wheezes.   Breath sounds decreased at bases   Abdominal: Soft. Bowel sounds are normal. She exhibits distension. There is tenderness. There is no guarding.   Wound vac placed to middle chevron incision + R lateral side, no s/s/i  Erythema + induration RLQ, improving  RLQ VALORIE drain sites c/d/i with sutures   Musculoskeletal: Normal range  of motion. She exhibits edema (3+ BLE, + generalized).   Neurological: She is alert and oriented to person, place, and time.   Skin: Skin is warm. She is not diaphoretic.   Psychiatric: She has a normal mood and affect.   Nursing note and vitals reviewed.      Laboratory:  Immunosuppressants         Stop Route Frequency     tacrolimus capsule 1 mg      -- Oral 2 times daily        CBC:   Recent Labs   Lab 12/08/18  0414   WBC 2.72*   RBC 3.28*   HGB 9.3*   HCT 29.8*   *   MCV 91   MCH 28.4   MCHC 31.2*     CMP:   Recent Labs   Lab 12/08/18 0414      CALCIUM 8.4*   ALBUMIN 2.9*   PROT 5.0*      K 3.9   CO2 29      BUN 39*   CREATININE 0.8   ALKPHOS 138*   ALT 18   AST 18   BILITOT 1.5*     Labs within the past 24 hours have been reviewed.    Diagnostic Results:  I have personally reviewed all pertinent imaging studies.Scheduled Meds:   aspirin  81 mg Oral Daily    atovaquone  1,500 mg Oral Daily    entecavir  0.5 mg Oral Daily    furosemide  40 mg Intravenous Once    heparin (porcine)  5,000 Units Subcutaneous Q12H    k phos di & mono-sod phos mono  2 tablet Oral BID    magnesium oxide  800 mg Oral BID    metoprolol tartrate  25 mg Oral BID    octreotide  50 mcg Subcutaneous Q8H    pantoprazole  40 mg Oral BID    sodium chloride 0.9%  10 mL Intravenous Q6H    tacrolimus  1 mg Oral BID    vancomycin (VANCOCIN) IVPB  750 mg Intravenous Q24H     Continuous Infusions:   insulin (HUMAN R) infusion (adults) 1.6 Units/hr (12/08/18 0904)    TPN ADULT CENTRAL LINE CUSTOM 50 mL/hr at 12/07/18 2145    TPN ADULT CENTRAL LINE CUSTOM       PRN Meds:sodium chloride, sodium chloride, sodium chloride, acetaminophen, bisacodyl, calcium carbonate, dextrose 50%, dextrose 50%, glucagon (human recombinant), glucose, glucose, insulin aspart U-100, omnipaque, ondansetron, ondansetron, polyethylene glycol, Flushing PICC Protocol **AND** sodium chloride 0.9% **AND** sodium chloride 0.9%, sodium  chloride 0.9%, traMADol    Review of Systems    Objective:     Vital Signs (Most Recent):  Temp: 98.4 °F (36.9 °C) (12/08/18 1105)  Pulse: 81 (12/08/18 0800)  Resp: 18 (12/08/18 0800)  BP: 129/74 (12/08/18 0800)  SpO2: 97 % (12/08/18 0800) Vital Signs (24h Range):  Temp:  [97.6 °F (36.4 °C)-98.4 °F (36.9 °C)] 98.4 °F (36.9 °C)  Pulse:  [77-93] 81  Resp:  [16-21] 18  SpO2:  [96 %-98 %] 97 %  BP: (117-137)/(55-79) 129/74     Weight: 55.5 kg (122 lb 5.7 oz)  Body mass index is 21.67 kg/m².    Intake/Output - Last 3 Shifts       12/06 0700 - 12/07 0659 12/07 0700 - 12/08 0659 12/08 0700 - 12/09 0659    P.O. 60 455     I.V. (mL/kg) 44.6 (0.8) 51.2 (0.9) 2.1 (0)    Other       IV Piggyback 250 250      1147.5 65    Total Intake(mL/kg) 904.6 (16.2) 1903.7 (34.3) 67.1 (1.2)    Urine (mL/kg/hr) 275 (0.2) 0 (0)     Emesis/NG output       Drains 310 300 90    Other       Stool  0     Blood       Total Output 585 300 90    Net +319.6 +1603.7 -22.9           Urine Occurrence  3 x     Stool Occurrence  0 x           Physical Exam      Laboratory:  Immunosuppressants         Stop Route Frequency     tacrolimus capsule 1 mg      -- Oral 2 times daily        CBC:   Recent Labs   Lab 12/08/18 0414   WBC 2.72*   RBC 3.28*   HGB 9.3*   HCT 29.8*   *   MCV 91   MCH 28.4   MCHC 31.2*     CMP:   Recent Labs   Lab 12/08/18 0414      CALCIUM 8.4*   ALBUMIN 2.9*   PROT 5.0*      K 3.9   CO2 29      BUN 39*   CREATININE 0.8   ALKPHOS 138*   ALT 18   AST 18   BILITOT 1.5*     Coagulation: No results for input(s): PT, INR, APTT in the last 168 hours.  Labs within the past 24 hours have been reviewed.    Diagnostic Results:  I have personally reviewed all pertinent imaging studies.

## 2018-12-08 NOTE — ASSESSMENT & PLAN NOTE
- Dietary consulted.   - Temporal and distal extremity muscle wasting and edema.  - Encourage supplements and to increase nutritional intake.  - Per nephrology, pt to be on low protein diet.  - Prealbumin reviewed.   - Tolerating diet.  Denies n/v.    - Attempted low fat, high protein diet due to suspicion for chyle leak with no improvement seen in fluid character.  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 Adv diet approved by surgeon (in addition to) TPN 12/5  - Monitor.

## 2018-12-08 NOTE — ASSESSMENT & PLAN NOTE
- Paracentesis 10/1, 5L removed, no fluid sent for analysis.  - Pt remains volume overloaded.   - Liver US 11/6 with severe ascites.   - Paracentesis 11/7 with 8.9 L removed. Wbc 39, segs 16%. Gram stain - no organisms seen and no WBC. Cultures NGTD.  - Paracentesis 11/12: 8.6 L removed. 34 wbc, 25% segs, 33 lymphs, 42 monocytes/macrophages. Cultures NGTD  - Paracentesis 11/21 with 4 L removed, neg for infection.  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 Milky appearance, cell count of fluid (51% lymphs), triglycerides 2208, consistent with chyle leak.   - Peritoneal fluid 11/29 growing enterococcus and will plan for treatment with Vanc x 10 days per ID recs.   - Attempted low fat, high protein diet due to suspicion for chyle leak with no improvement seen in fluid character.  - CT abd/pelvis 12/3 shows small volume abdominal ascites, improved compared to prior, pelvis ascites unchanged from prior, unchanged b/l pleural effusions (large on R, moderate on L) with bibasilar compressive atelectasis and consolidative changes.  - Placed PICC line and started TPN + octreotide 12/3. Improvement in color of fluid seen 12/6 - 12/7 (less milky, more abbie). Will continue with TPN + adv diet to low fat at this time.  - Continue to monitor.  -lasix 40 mg iv x 1

## 2018-12-08 NOTE — ASSESSMENT & PLAN NOTE
BG goal 140-180    rewrite transition drip at rate of 1.6 with Low SQ Correction scale.  POC glucose q4 hr given finger sticks - coordinate with meals during the day          Discharge planning:  [place]

## 2018-12-08 NOTE — SUBJECTIVE & OBJECTIVE
"Interval HPI:   Overnight events: Remains in TSU. BG at or below goal overnight on insulin infusion at 2.3 u/hr; rate decreased to 1.6 per orders.   Eatin% clears   Nausea: No  Hypoglycemia and intervention: No  Fever: No  TPN at 50 cc/hr       /74 (BP Location: Left arm, Patient Position: Lying)   Pulse 88   Temp 97.9 °F (36.6 °C) (Oral)   Resp 19   Ht 5' 3" (1.6 m)   Wt 55.5 kg (122 lb 5.7 oz)   LMP  (LMP Unknown)   SpO2 98%   Breastfeeding? No   BMI 21.67 kg/m²     Labs Reviewed and Include    Recent Labs   Lab 18  0414      CALCIUM 8.4*   ALBUMIN 2.9*   PROT 5.0*      K 3.9   CO2 29      BUN 39*   CREATININE 0.8   ALKPHOS 138*   ALT 18   AST 18   BILITOT 1.5*     Lab Results   Component Value Date    WBC 2.72 (L) 2018    HGB 9.3 (L) 2018    HCT 29.8 (L) 2018    MCV 91 2018     (L) 2018     No results for input(s): TSH, FREET4 in the last 168 hours.  Lab Results   Component Value Date    HGBA1C 6.0 (H) 10/04/2018       Nutritional status:   Body mass index is 21.67 kg/m².  Lab Results   Component Value Date    ALBUMIN 2.9 (L) 2018    ALBUMIN 2.9 (L) 2018    ALBUMIN 2.8 (L) 2018     Lab Results   Component Value Date    PREALBUMIN 15 (L) 2018    PREALBUMIN 12 (L) 2018       Estimated Creatinine Clearance: 54.9 mL/min (based on SCr of 0.8 mg/dL).    Accu-Checks  Recent Labs     18  1731 18  2130 18  0130 18  0541 18  0929 18  1319 18  1744 18  2042 18  0028 18  0404   POCTGLUCOSE 275* 225* 172* 229* 235* 206* 171* 166* 150* 118*       Current Medications and/or Treatments Impacting Glycemic Control  Immunotherapy:    Immunosuppressants         Stop Route Frequency     tacrolimus capsule 2 mg      -- Oral 2 times daily        Steroids:   Hormones (From admission, onward)    Start     Stop Route Frequency Ordered    18 1400  octreotide " injection 50 mcg      -- SubQ Every 8 hours 12/03/18 1116    10/17/18 0945  predniSONE tablet 15 mg      10/24 0859 Oral Daily 10/17/18 0943        Pressors:    Autonomic Drugs (From admission, onward)    Start     Stop Route Frequency Ordered    10/06/18 1617  rocuronium (ZEMURON) 10 mg/mL injection     Comments:  Created by cabinet override    10/07 0429   10/06/18 1617        Hyperglycemia/Diabetes Medications:   Antihyperglycemics (From admission, onward)    Start     Stop Route Frequency Ordered    12/05/18 1115  insulin regular (Humulin R) 100 Units in sodium chloride 0.9% 100 mL infusion      -- IV Continuous 12/05/18 1012    12/05/18 1111  insulin aspart U-100 pen 0-4 Units      -- SubQ As needed (PRN) 12/05/18 1012    10/07/18 1200  insulin regular (Humulin R) 100 Units in sodium chloride 0.9% 100 mL infusion      10/13 2100 IV Continuous 10/07/18 1055

## 2018-12-08 NOTE — PT/OT/SLP PROGRESS
Occupational Therapy      Patient Name:  Scarlett Reddy   MRN:  [id number]    Patient not seen today secondary to Other (Comment)(Not able to be seen 2* caseload. ). Will follow-up Sunday 12/8/2018.    Fan Hraper, OT  12/8/2018

## 2018-12-08 NOTE — ASSESSMENT & PLAN NOTE
Managed per primary team  Avoid hypoglycemia    Recent Labs   Lab 12/08/18  0414   ALT 18   AST 18   ALKPHOS 138*   BILITOT 1.5*   PROT 5.0*   ALBUMIN 2.9*

## 2018-12-08 NOTE — ASSESSMENT & PLAN NOTE
Avoid insulin stacking and hypoglycemia.  Lab Results   Component Value Date    CREATININE 0.8 12/08/2018

## 2018-12-08 NOTE — PROGRESS NOTES
[facility]  Liver Transplant  Progress Note    Patient Name: Scarlett Reddy  MRN: [id number]  Admission Date: 10/1/2018  Hospital Length of Stay: 67 days  Code Status: Full Code  Primary Care Provider: Primary Doctor No  Post-Operative Day: 64    ORGAN:   LIVER  Disease Etiology: Cirrhosis: Type B and D  Donor Type:    - Brain Death  CDC High Risk:   No  Donor CMV Status:   Donor CMV Status: Positive  Donor HBcAB:   Negative  Donor HCV Status:   Negative  Donor HBV SETH: Negative  Donor HCV SETH: Negative  Whole or Partial: Whole Liver  Biliary Anastomosis: End to End  Arterial Anatomy: Replaced Left Hepatic and Right Hepatic  Subjective:     History of Present Illness:  Ms. Reddy is a 67 y/o female with PMH of ESLD secondary Hep B and D.  Listed for liver transplant with MELD 23.  Paracentesis 10/1 with 5L removed, no fluid sent for analysis.  Morning labs significant for hyponatremia, Na 119.  Pt admitted to LTS for sodium monitoring.        Hospital Course:  Hospital Course: 67 y/o F h/o HBV/delta cirrhosis s/p DBDLT 10/5/18 (CMV D+/R+, steroid induction, MMF/tacro/pred maintenance) with take back on 10/6 2/2 hyperbilirubinema and bilious VALORIE drainage, no leak identified. Post-op course c/b hypercapneic and hypoxic respiratory failure and was reintubated though quickly extubated now doing well. Liver bx (10/6) sig for Zone 3 hemorrhage and collapse, in addition to cholestasis. Transferred from ICU on POD #4. ID consulted to comment on positive diphtheroid culture from ascitic fluid (likely skin colonization) as well as ESBL Klebsiella pneumoniae in urine culture (10/4).  Pt asymptomatic and cell count from ascitic fluid unremarkable. Per ID recs, no need to treat diphtheroids or asymptomatic ESBL Kleb pneumo bacteriuria though pt has had 6 days of therapy which would cover these organisms. Mild peritransplant ascites- repeat US 10/6 with increased arterial resistive indices. Repeat US 10/9  w/ continued elevation in RIs and fluid collections. LFTs trending down nicely.     Acute gradual worsening of renal function s/p OLTx. Creatinine on arrival 0.8 and has been up trending. Nephrology consulted for post-op EVA. Cr cont to trend up but remains to have good UOP. Had stable response to lasiz diuresis.  BUN elevated requiring multiple rounds of dialysis (10/15, 10/17, 10/20, 10/31). No significant improvements in kidney function noted.   In addition, pt H/H cont to fluctuate requiring multiple blood transfusions (10/14, 10/16, 10/18, 10/19, 10/22, 10/27, 10/29).  Pt reported dark stools 10/15 but color has normalized. FOBT+. EGD 10/18 consistent with ulcerated mucosa in duodenum s/p coagulation. Concern for H. Pylori. Started Amoxicillin/levofloxacin (10/19, complete 11/2)). Remains on Protonix 40 mg BID. Biopsy negative for infection and CMV. Will cont close monitoring H/H, transfuse as needed with goal Hb > 7.0. Of note, Urine cx + Klebsiella pneumoniae (10/13). Received 3 day course of ciprofloxacin, dc'd 10/19.   On 10/19 pm pt c/o crushing chest pain. Troponin 0.094, likely 2/2 ischemic stress. EKG NSR.   Liver US 11/6 showed no arterial flow. Paracentesis 11/7 with 8.9 L removed. Wbc 39, segs 16%. Gram stain no organisms seen + no WBC. Pt feels better since having fluid removed but she continues to have worsening abdominal distention & LE edema.Repeat Liver US 11/8 showed arterial flow with increased RIs. Paracentesis 11/12 with 8.6L removed (WBC 34, segs 25%). 11/15 liver u/s showed abnormally decreased main hepatic artery peak systolic velocity with slow arterial flow and abnormal tardus parvus waveforms intrahepatically. Consulted vascular surgery. Repeat liver US 11/19 showed no arterial flow on R and sluggish flow on L. Hepatic angiogram 11/19 confirmed HAT.  Friday 11/16, purulent drainage noted from middle of chevron incision. Area opened and packed. Cx grew klebsiella p. CT scan showed no  signs of infection. On Monday 11/18, it was noted that the right/lateral side of the chevron had 3 small openings draining purulent fluid. Opened the area and packed. Vac placed on 11/24. Wound care following. CT abd/pelvis 11/3 showed small volume abdominal ascites, improved compared to prior, pelvis ascites unchanged from prior, unchanged b/l pleural effusions (large on R, moderate on L) with bibasilar compressive atelectasis and consolidative changes. Patient finished day 7 of 7 of Ertapenem for kleb pneumo ESBL UTI 11/29. Of note, pt grew same bacteria from wound 11/16. Liver US to assess recanalization post HAT 12/4 -> conclusion that pt may recanalize. Will monitor closely with no further intervention planned at this time.    Interval history: No acute events overnight. TPN + octreotide started 12/3. Pt feeling well today. Peritoneal fluid (perc drain) continues to improve in color w/ TPN.  Will continue w/ TPN + advance diet. Cr fx and LFTs stable. Will continue to monitor. H/H stable. Pt ambulating with assistance and OOBTC often. Peritoneal fluid 11/29 w/ enterococcus. Per ID, treating with Vanc x 10 days and then repeat peritoneal fluid cx. Wound vac changed 12/7, w/ improvements noted.  Per wound care, will stop vac next dressing change because wound depth has improved to 0.5 cm.    Scheduled Meds:   aspirin  81 mg Oral Daily    atovaquone  1,500 mg Oral Daily    entecavir  0.5 mg Oral Daily    furosemide  40 mg Intravenous Once    heparin (porcine)  5,000 Units Subcutaneous Q12H    k phos di & mono-sod phos mono  2 tablet Oral BID    magnesium oxide  800 mg Oral BID    metoprolol tartrate  25 mg Oral BID    octreotide  50 mcg Subcutaneous Q8H    pantoprazole  40 mg Oral BID    sodium chloride 0.9%  10 mL Intravenous Q6H    tacrolimus  1 mg Oral BID    vancomycin (VANCOCIN) IVPB  750 mg Intravenous Q24H     Continuous Infusions:   insulin (HUMAN R) infusion (adults) 1.6 Units/hr (12/08/18  0904)    TPN ADULT CENTRAL LINE CUSTOM 50 mL/hr at 12/07/18 2145    TPN ADULT CENTRAL LINE CUSTOM       PRN Meds:sodium chloride, sodium chloride, sodium chloride, acetaminophen, bisacodyl, calcium carbonate, dextrose 50%, dextrose 50%, glucagon (human recombinant), glucose, glucose, insulin aspart U-100, omnipaque, ondansetron, ondansetron, polyethylene glycol, Flushing PICC Protocol **AND** sodium chloride 0.9% **AND** sodium chloride 0.9%, sodium chloride 0.9%, traMADol    Review of Systems   Constitutional: Positive for activity change and appetite change (improving). Negative for chills, fatigue and fever.   HENT: Negative for congestion, facial swelling and trouble swallowing.    Eyes: Negative for pain, discharge and visual disturbance.   Respiratory: Negative for cough, chest tightness, shortness of breath and wheezing.    Cardiovascular: Positive for leg swelling. Negative for chest pain and palpitations.   Gastrointestinal: Positive for abdominal distention and abdominal pain. Negative for constipation, diarrhea, nausea and vomiting.   Endocrine: Negative.    Genitourinary: Negative for decreased urine volume, difficulty urinating and dysuria.   Musculoskeletal: Positive for back pain.Negative for arthralgias and neck stiffness.   Skin: Positive for wound.   Allergic/Immunologic: Positive for immunocompromised state.   Neurological: Positive for weakness. Negative for tremors and headaches.   Psychiatric/Behavioral: Negative for confusion and sleep disturbance.     Objective:     Vital Signs (Most Recent):  Temp: 98.4 °F (36.9 °C) (12/08/18 1105)  Pulse: 81 (12/08/18 0800)  Resp: 18 (12/08/18 0800)  BP: 129/74 (12/08/18 0800)  SpO2: 97 % (12/08/18 0800) Vital Signs (24h Range):  Temp:  [97.6 °F (36.4 °C)-98.4 °F (36.9 °C)] 98.4 °F (36.9 °C)  Pulse:  [77-93] 81  Resp:  [16-21] 18  SpO2:  [96 %-98 %] 97 %  BP: (117-137)/(55-79) 129/74     Weight: 55.5 kg (122 lb 5.7 oz)  Body mass index is 21.67  kg/m².    Intake/Output - Last 3 Shifts       12/06 0700 - 12/07 0659 12/07 0700 - 12/08 0659 12/08 0700 - 12/09 0659    P.O. 60 455     I.V. (mL/kg) 44.6 (0.8) 51.2 (0.9) 2.1 (0)    Other       IV Piggyback 250 250      1147.5 65    Total Intake(mL/kg) 904.6 (16.2) 1903.7 (34.3) 67.1 (1.2)    Urine (mL/kg/hr) 275 (0.2) 0 (0)     Emesis/NG output       Drains 310 300 90    Other       Stool  0     Blood       Total Output 585 300 90    Net +319.6 +1603.7 -22.9           Urine Occurrence  3 x     Stool Occurrence  0 x           Physical Exam   Constitutional: She is oriented to person, place, and time. She appears well-developed. No distress.   Temporal and distal extremity muscle wasting   HENT:   Head: Normocephalic and atraumatic.   Eyes: EOM are normal. No scleral icterus.   Neck: Normal range of motion.   Cardiovascular: Normal rate, regular rhythm and normal heart sounds.   No murmur heard.  Pulmonary/Chest: Effort normal. No respiratory distress. She has no wheezes.   Breath sounds decreased at bases   Abdominal: Soft. Bowel sounds are normal. She exhibits distension. There is tenderness. There is no guarding.   Wound vac placed to middle chevron incision + R lateral side, no s/s/i  Erythema + induration RLQ, improving  RLQ VALORIE drain sites c/d/i with sutures   Musculoskeletal: Normal range of motion. She exhibits edema (3+ BLE, + generalized).   Neurological: She is alert and oriented to person, place, and time.   Skin: Skin is warm. She is not diaphoretic.   Psychiatric: She has a normal mood and affect.   Nursing note and vitals reviewed.      Laboratory:  Immunosuppressants         Stop Route Frequency     tacrolimus capsule 1 mg      -- Oral 2 times daily        CBC:   Recent Labs   Lab 12/08/18 0414   WBC 2.72*   RBC 3.28*   HGB 9.3*   HCT 29.8*   *   MCV 91   MCH 28.4   MCHC 31.2*     CMP:   Recent Labs   Lab 12/08/18 0414      CALCIUM 8.4*   ALBUMIN 2.9*   PROT 5.0*      K  3.9   CO2 29      BUN 39*   CREATININE 0.8   ALKPHOS 138*   ALT 18   AST 18   BILITOT 1.5*     Labs within the past 24 hours have been reviewed.    Diagnostic Results:  I have personally reviewed all pertinent imaging studies.Scheduled Meds:   aspirin  81 mg Oral Daily    atovaquone  1,500 mg Oral Daily    entecavir  0.5 mg Oral Daily    furosemide  40 mg Intravenous Once    heparin (porcine)  5,000 Units Subcutaneous Q12H    k phos di & mono-sod phos mono  2 tablet Oral BID    magnesium oxide  800 mg Oral BID    metoprolol tartrate  25 mg Oral BID    octreotide  50 mcg Subcutaneous Q8H    pantoprazole  40 mg Oral BID    sodium chloride 0.9%  10 mL Intravenous Q6H    tacrolimus  1 mg Oral BID    vancomycin (VANCOCIN) IVPB  750 mg Intravenous Q24H     Continuous Infusions:   insulin (HUMAN R) infusion (adults) 1.6 Units/hr (12/08/18 0904)    TPN ADULT CENTRAL LINE CUSTOM 50 mL/hr at 12/07/18 2145    TPN ADULT CENTRAL LINE CUSTOM       PRN Meds:sodium chloride, sodium chloride, sodium chloride, acetaminophen, bisacodyl, calcium carbonate, dextrose 50%, dextrose 50%, glucagon (human recombinant), glucose, glucose, insulin aspart U-100, omnipaque, ondansetron, ondansetron, polyethylene glycol, Flushing PICC Protocol **AND** sodium chloride 0.9% **AND** sodium chloride 0.9%, sodium chloride 0.9%, traMADol    Review of Systems    Objective:     Vital Signs (Most Recent):  Temp: 98.4 °F (36.9 °C) (12/08/18 1105)  Pulse: 81 (12/08/18 0800)  Resp: 18 (12/08/18 0800)  BP: 129/74 (12/08/18 0800)  SpO2: 97 % (12/08/18 0800) Vital Signs (24h Range):  Temp:  [97.6 °F (36.4 °C)-98.4 °F (36.9 °C)] 98.4 °F (36.9 °C)  Pulse:  [77-93] 81  Resp:  [16-21] 18  SpO2:  [96 %-98 %] 97 %  BP: (117-137)/(55-79) 129/74     Weight: 55.5 kg (122 lb 5.7 oz)  Body mass index is 21.67 kg/m².    Intake/Output - Last 3 Shifts       12/06 0700 - 12/07 0659 12/07 0700 - 12/08 0659 12/08 0700 - 12/09 0659    P.O. 60 455     I.V.  "(mL/kg) 44.6 (0.8) 51.2 (0.9) 2.1 (0)    Other       IV Piggyback 250 250      1147.5 65    Total Intake(mL/kg) 904.6 (16.2) 1903.7 (34.3) 67.1 (1.2)    Urine (mL/kg/hr) 275 (0.2) 0 (0)     Emesis/NG output       Drains 310 300 90    Other       Stool  0     Blood       Total Output 585 300 90    Net +319.6 +1603.7 -22.9           Urine Occurrence  3 x     Stool Occurrence  0 x           Physical Exam      Laboratory:  Immunosuppressants         Stop Route Frequency     tacrolimus capsule 1 mg      -- Oral 2 times daily        CBC:   Recent Labs   Lab 12/08/18  0414   WBC 2.72*   RBC 3.28*   HGB 9.3*   HCT 29.8*   *   MCV 91   MCH 28.4   MCHC 31.2*     CMP:   Recent Labs   Lab 12/08/18 0414      CALCIUM 8.4*   ALBUMIN 2.9*   PROT 5.0*      K 3.9   CO2 29      BUN 39*   CREATININE 0.8   ALKPHOS 138*   ALT 18   AST 18   BILITOT 1.5*     Coagulation: No results for input(s): PT, INR, APTT in the last 168 hours.  Labs within the past 24 hours have been reviewed.    Diagnostic Results:  I have personally reviewed all pertinent imaging studies.    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   - POD 1 US: increased arterial resistive indices, suggestive of edema vs acute rejection vs congestion.  - Repeat US 10/9 with continued elevation of RIs.  - LFTs stable.  - T bili trending down.   - Repeat liver US (10/11): elevated RIs.  - Liver US 11/6 to assess for ascites. No arterial flow within the liver allograft concerning for interval thrombosis/occlusion of the arterial system. Severe ascites seen.  - Liver US 11/8: arterial flow seen w/ elevated RIs   - LFTs remain stable. No CTA at this time due to EVA and normal liver function.   - Liver US 11/15 showed "abnormally decreased main hepatic artery peak systolic velocity " "with slow arterial flow and abnormal tardus parvus waveforms intrahepatically."   -Vascular surgery consulted  - Liver US 11/19 showed no arterial flow on the right and sluggish flow on the left.   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 No further intervention planned at this time as patient's LFTs are stable. Will monitor.  - CT abd/pelvis 11/26 showed: 1. Persistent large volume of ascites. 2. Large right and moderate left pleural effusions with adjacent basilar atelectasis. 3. Anasarca  - IR placed perc drain 11/29 due to suspicion for chyle leak. 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  - Fluid also growing enterococcus and will plan for treatment with Vanc x 10 days per ID recs. Will need repeat fluid cx post treatment.       Other ascites    - Paracentesis 10/1, 5L removed, no fluid sent for analysis.  - Pt remains volume overloaded.   - Liver US 11/6 with severe ascites.   - Paracentesis 11/7 with 8.9 L removed. Wbc 39, segs 16%. Gram stain - no organisms seen and no WBC. Cultures NGTD.  - Paracentesis 11/12: 8.6 L removed. 34 wbc, 25% segs, 33 lymphs, 42 monocytes/macrophages. Cultures NGTD  - Paracentesis 11/21 with 4 L removed, neg for infection.  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 Milky appearance, cell count of fluid (51% lymphs), triglycerides 2208, consistent with chyle leak.   - Peritoneal fluid 11/29 growing enterococcus and will plan for treatment with Vanc x 10 days per ID recs.   - Attempted low fat, high protein diet due to suspicion for chyle leak with no improvement seen in fluid character.  - CT abd/pelvis 12/3 shows small volume abdominal ascites, improved compared to prior, pelvis ascites unchanged from prior, unchanged b/l pleural effusions (large on R, moderate on L) with bibasilar compressive atelectasis and consolidative changes.  - Placed PICC line and started TPN + octreotide " 12/3. Improvement in color of fluid seen 12/6 - 12/7 (less milky, more abbie). Will continue with TPN + adv diet to low fat at this time.  - Continue to monitor.  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  - Tx w/ Entecavir- dose changed to daily 11/26 due to improved renal fx  - 12/3 Hep B surface antigen negative 12/3, surface AB quant 571, and Hep B DNA <10.  - Continue to monitor, per protocol.     Type 2 diabetes mellitus with diabetic polyneuropathy, with long-term current use of insulin    - Continue home regimen.  - TPN started 12/3 and pt now on insulin drip.  - Endocrine following. Appreciate recs.       Hypoalbuminemia    - Continue to monitor.     Sequelae of hyperalimentation    - Monitor.       Pleural effusion    - Common post liver transplant  - CT 11/26 showed: Large right and moderate left pleural effusions with adjacent basilar atelectasis.  - Pt sp02 stable on RA, but c/o some orthopnea.  - CT abd/pelvis 12/3 showed unchanged b/l pleural effusions (large on R, moderate on L) with bibasilar compressive atelectasis and consolidative changes.  - Sp02 stable & no signs of respiratory infection. Will continue to monitor.       Chronic kidney disease, stage III (moderate)    - Kidney fx improving  - Monitor       Hypomagnesemia    - Continue PO Mag Oxide  - Continue to monitor.       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       Delayed surgical wound healing    - Middle chevron incision opened 11/16 with purulent drainage  - Packed with Aquacel ag at this time. Growing Klebsiella p.   - CT abd/pelvis 11/17- no signs of infection  - 11/17: R side of chevron incision w/ 3 small holes draining purulent fluid, opened and packed w/ aquacel.   - Wound care following.  - Wound w/ increased drainage, so wound care  "placed vac 11/24/18.  Apprec recs. Extending Ertapenem thru 11/28 per ID.    - Abdomen erythematous and indurated RLQ inferior to chevron.  - CT abd/pelvis 11/26 - no sign of infection  - Last wound vac change 12/7 with improvements noted in wound - depth now 0.5cm, so vac to be d/c'd at next dressing change, per wound care.    .       Hepatic artery thrombosis of transplanted liver    - Liver US 11/19 showed no arterial flow to R and sluggish flow to L  - Hepatic angiogram 11/19 confirmed HAT  - Liver US 12/4 to evaluate for recanalization reviewed. Recanalization possible. Will continue to monitor.  - No further intervention planned at this time. Will continue to monitor.     Other drug-induced pancytopenia    - Decreased plt, wbc, h/h  - See "leukopenia"  - Continue to monitor.     Thrombocytopenia, unspecified    - Monitor.       Alteration in skin integrity    - Monitor.       Leukopenia    - Stopped Cellcept, Bactrim, and Valcyte 10/31   - Cellcept half dose resumed 11/6  - Started Atovaquone 11/6.   - CMV PCR noted to be positive at 129. Will hold off on treatment with valcyte at this time. Monitoring weekly CMV PCRs.  - Cellcept held 11/23 2/2 infection (UTI + wound)  - Continue to monitor.       Azotemia    - CRRT 10/31 for uremic toxin clearance.           UTI (urinary tract infection)    - Urine Cx 10/15 w/ Klebsiella pneumonia.  - Started Ciprofloxacin (10/17-10/19).   - Started on amoxicillin/levo for H. Pylori (completed abx 11/2).  - Pt c/o lower back pain 11/22. UA w/ trace leuks, moderate wbs, 1+ blood, cx pending  - Pt symptomatic (back pain) & with ESBL in wound previously.  Ertapenem 1g QD x 3 days (started 11/23), per ID recs.    - Given pt cont to have back pain and concern for infection in wound ( thick drainage and increased drainage)-  extended Ertapenem for 4 more days per ID (stop date 11/29/18).         Acute blood loss anemia    - Expected post transplant  - H&H cont to fluctuate.   - " "FOBT +.  - EGD 10/18 - showed diffuse bleeding with ulcerated duodenal mucosa.  - EGD 11/5 - improvement seen, no active bleeding.  - Resume heparin sq injections 11/6.   - Decrease heparin sq from TID to BID 11/15   - Heparin dc'd 11/17 then resumed a few days later 2/2 risk vs. Benefit  - Pt continues to have prolonged bleeding from heparin injection sites, decreased frequency to q12 from q8 as pt has been ambulating and up in chair frequently.  - See "anemia requiring transfusions."  - Continue to monitor.             Post LT - Acute renal failure    - Creatinine on arrival 0.8 and trended up.  - Nephrology consulted.  - Renal US (10/11): no hydronephrosis.  - Echo 10/12: diastolic dysfunction, likely 2/2 to ARF.  - CRRT 10/14; SLED 10/17, 10/20. --> no improvements noted in kidney fxn.  - 24 hr urine creatine collection: CrCl of 7.   - CRRT 10/31 with 1L removed.   - Cr level now with improvement.   - See "hypervolemia associated with renal insufficiency"     At risk for opportunistic infections    - Valcyte for CMV prophylaxis--> holding (10/31) for leukopenia. Pt will need weekly CMV PCR while discontinued.  - CMV PCR noted to be positive at 129. .  - CMV PCR 11/8: 105  - CMV PCR 11/15 with 89 copies  - CMV PCR 11/23 95 - increased from previous. Continue to hold Valcyte & monitor pt closely.  - CMV PCR 11/29 146 - per ID, will continue to monitor w/ weekly PCRs.  - CMV PCR 12/6 pending.  - Bactrim for PCP prophylaxis (MW).--> holding (10/31) for leukopenia.   - Start Atovaquone 11/6.  - Fluconazole for fungal prophylaxis (first dose 10/6). D/c Fluc 11/16 bc pt had been on it for > 30 days post take back.       Long-term use of immunosuppressant medication    - Prograf: stopped 10/29. Resumed 11/2.  --> Will monitor for signs of toxic side effects, check daily troughs, and change meds accordingly.   - Prednisone: stopped 10/29. Started on Hydrocortisone. pred taper resumed.  - Cellcept: stopped 10/31 for " leukopenia. Restarted Cellcept 500 mg bid, 11/6.  - Cellcept held 11/23 2/2 infection         Prophylactic immunotherapy    - See long term use immunosuppresion.       Corticosteroids adverse reaction    - Delayed wound healing, hyperglycemia.  - Monitor.       Physical deconditioning    - PT/OT consulted.  Progressing well w/ therapy.  - Recommend home health PT and rolling walker at discharge (orders placed 11/6).       Anemia requiring transfusions    - Fluctuating H&H.    - Transfusions: 10/14; 10/16; 10/18; 10/19; 10/22; 10/27; 10/29, 10/31, 11/3, 11/21, 11/29, 12/4  - FOBT +  - LDH elevated, Hapto < 10.  - EGD 10/18: ulcerated duodenal mucosa.   - Consulted GI about this ongoing issue and further need of another EGD.  - EGD 11/5 unremarkable.   - Iron studies 11/13:  95, TIBC 107, sat iron 89, transferrin 72, ferritin pending  - Hemolytic anemia workup 11/13: Haptoglobin 132, retic 3.9   - H/H dropped 12/4 - transfused 2u PRBC with good response  - No overt signs of bleeding.  - Continue to monitor.           Severe malnutrition    - Dietary consulted.   - Temporal and distal extremity muscle wasting and edema.  - Encourage supplements and to increase nutritional intake.  - Per nephrology, pt to be on low protein diet.  - Prealbumin reviewed.   - Tolerating diet.  Denies n/v.    - Attempted low fat, high protein diet due to suspicion for chyle leak with no improvement seen in fluid character.  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 Adv diet approved by surgeon (in addition to) TPN 12/5  - Monitor.             VTE Risk Mitigation (From admission, onward)        Ordered     heparin (porcine) injection 5,000 Units  Every 12 hours      12/06/18 1058     IP VTE HIGH RISK PATIENT  Once      11/05/18 1145     Place sequential compression device  Until discontinued      10/05/18 0709          The patients clinical status was discussed at multidisplinary rounds, involving transplant surgery, transplant  medicine, pharmacy, nursing, nutrition, and social work    Discharge Planning: not candidate at this time  No Patient Care Coordination Note on file.      Seble Tineo NP  Liver Transplant  [facility]

## 2018-12-08 NOTE — PLAN OF CARE
Problem: Patient Care Overview  Goal: Plan of Care Review  Outcome: Ongoing (interventions implemented as appropriate)  VSS. Call light and personal items in reach. Insulin gtt @ 1.6ml/hr. Pt switched to a Regular low cholesterol/sat fat diet. Pt ate a gelatin and popsicle before BS was checked. BS was over 400. 4 units given on sliding scale. No other issues. WCTM.

## 2018-12-08 NOTE — ASSESSMENT & PLAN NOTE
- Valcyte for CMV prophylaxis--> holding (10/31) for leukopenia. Pt will need weekly CMV PCR while discontinued.  - CMV PCR noted to be positive at 129. .  - CMV PCR 11/8: 105  - CMV PCR 11/15 with 89 copies  - CMV PCR 11/23 95 - increased from previous. Continue to hold Valcyte & monitor pt closely.  - CMV PCR 11/29 146 - per ID, will continue to monitor w/ weekly PCRs.  - CMV PCR 12/6 pending.  - Bactrim for PCP prophylaxis ([facility]).--> holding (10/31) for leukopenia.   - Start Atovaquone 11/6.  - Fluconazole for fungal prophylaxis (first dose 10/6). D/c Fluc 11/16 bc pt had been on it for > 30 days post take back.

## 2018-12-08 NOTE — PROGRESS NOTES
"[facility]  Endocrinology  Progress Note    Admit Date: 10/1/2018     Reason for Consult: Management of type 2 DM, Hyperglycemia      Surgical Procedure and Date: Liver transplant 10/5/18, Ex lap 10/6/18     Diabetes diagnosis year:      Home Diabetes Medications:  Lantus 18 units HS and novolog 17 units with meals plus correction scale (150-200 +1)        Lab Results   Component Value Date     HGBA1C 6.8 (H) 2018        How often checking glucose at home? 3-4   BG readings on regimen: 120-150.  Post prandial readings as high as upper 200s  Hypoglycemia on the regimen? yes, none recently   Missed doses on regimen?  No     Diabetes Complications include:     Diabetic peripheral neuropathy      Complicating diabetes co morbidities:   CIRRHOSIS        HPI:  Patient is a 68 y.o. female with a diagnosis of ESLD, listed for liver transplant with meld 23 who underwent live transplant on 10/5/18.  Back to OR on 10/6/18 for ex lap.  Admitted 10/1/18 for hyponatremia s/p paracentesis.  Personal has known diagnosis of diabetes, endocrine consulted for diabetes management.    Post-operative course complicated by ESBL Klebsiella pneumoniae in urine (in contact isolation), peritransplant ascites, worsening renal function (required temporary dialysis), anemia (multiple blood transfusions), and possible GI bleed.     Interval HPI:   Overnight events: Remains in TSU. BG at or below goal overnight on insulin infusion at 2.3 u/hr; rate decreased to 1.6 per orders.   Eatin% clears   Nausea: No  Hypoglycemia and intervention: No  Fever: No  TPN at 50 cc/hr       /74 (BP Location: Left arm, Patient Position: Lying)   Pulse 88   Temp 97.9 °F (36.6 °C) (Oral)   Resp 19   Ht 5' 3" (1.6 m)   Wt 55.5 kg (122 lb 5.7 oz)   LMP  (LMP Unknown)   SpO2 98%   Breastfeeding? No   BMI 21.67 kg/m²      Labs Reviewed and Include    Recent Labs   Lab 18  0414      CALCIUM 8.4*   ALBUMIN " 2.9*   PROT 5.0*      K 3.9   CO2 29      BUN 39*   CREATININE 0.8   ALKPHOS 138*   ALT 18   AST 18   BILITOT 1.5*     Lab Results   Component Value Date    WBC 2.72 (L) 12/08/2018    HGB 9.3 (L) 12/08/2018    HCT 29.8 (L) 12/08/2018    MCV 91 12/08/2018     (L) 12/08/2018     No results for input(s): TSH, FREET4 in the last 168 hours.  Lab Results   Component Value Date    HGBA1C 6.0 (H) 10/04/2018       Nutritional status:   Body mass index is 21.67 kg/m².  Lab Results   Component Value Date    ALBUMIN 2.9 (L) 12/08/2018    ALBUMIN 2.9 (L) 12/07/2018    ALBUMIN 2.8 (L) 12/06/2018     Lab Results   Component Value Date    PREALBUMIN 15 (L) 11/03/2018    PREALBUMIN 12 (L) 09/17/2018       Estimated Creatinine Clearance: 54.9 mL/min (based on SCr of 0.8 mg/dL).    Accu-Checks  Recent Labs     12/06/18  1731 12/06/18  2130 12/07/18  0130 12/07/18  0541 12/07/18  0929 12/07/18  1319 12/07/18  1744 12/07/18  2042 12/08/18  0028 12/08/18  0404   POCTGLUCOSE 275* 225* 172* 229* 235* 206* 171* 166* 150* 118*       Current Medications and/or Treatments Impacting Glycemic Control  Immunotherapy:    Immunosuppressants         Stop Route Frequency     tacrolimus capsule 2 mg      -- Oral 2 times daily        Steroids:   Hormones (From admission, onward)    Start     Stop Route Frequency Ordered    12/03/18 1400  octreotide injection 50 mcg      -- SubQ Every 8 hours 12/03/18 1116    10/17/18 0945  predniSONE tablet 15 mg      10/24 0859 Oral Daily 10/17/18 0943        Pressors:    Autonomic Drugs (From admission, onward)    Start     Stop Route Frequency Ordered    10/06/18 1617  rocuronium (ZEMURON) 10 mg/mL injection     Comments:  Created by cabinet override    10/07 0429   10/06/18 1617        Hyperglycemia/Diabetes Medications:   Antihyperglycemics (From admission, onward)    Start     Stop Route Frequency Ordered    12/05/18 1115  insulin regular (Humulin R) 100 Units in sodium chloride 0.9% 100 mL  infusion      -- IV Continuous 12/05/18 1012    12/05/18 1111  insulin aspart U-100 pen 0-4 Units      -- SubQ As needed (PRN) 12/05/18 1012    10/07/18 1200  insulin regular (Humulin R) 100 Units in sodium chloride 0.9% 100 mL infusion      10/13 2100 IV Continuous 10/07/18 1055          ASSESSMENT and PLAN    * Liver transplanted    Managed per primary team  Avoid hypoglycemia    Recent Labs   Lab 12/08/18  0414   ALT 18   AST 18   ALKPHOS 138*   BILITOT 1.5*   PROT 5.0*   ALBUMIN 2.9*            Type 2 diabetes mellitus with diabetic polyneuropathy, with long-term current use of insulin    BG goal 140-180    rewrite transition drip at rate of 1.6 with Low SQ Correction scale.  POC glucose q4 hr given finger sticks - coordinate with meals during the day          Discharge planning:  TBD       Sequelae of hyperalimentation    Likely elevating BG.        Other ascites    Paracentesis prn per primary team.  Avoid hypoglycemia  TPN started- NPO     UTI (urinary tract infection)    Infection may increase insulin resistance.          Alteration in skin integrity    Infection may increase insulin resistance.   Wound vac  Optimize BG control.        Corticosteroids adverse reaction    On steroid taper per transplant team; may elevate BG readings         EVA (acute kidney injury)-resolved as of 12/8/2018    Avoid insulin stacking and hypoglycemia.  Lab Results   Component Value Date    CREATININE 0.8 12/08/2018          Prophylactic immunotherapy    May increase insulin resistance.          Severe malnutrition    On TPN.          Aviva Caldera, NP  Endocrinology  [facility]

## 2018-12-09 PROBLEM — N18.30 CHRONIC KIDNEY DISEASE, STAGE III (MODERATE): Status: RESOLVED | Noted: 2018-11-26 | Resolved: 2018-12-09

## 2018-12-09 PROBLEM — N39.0 UTI (URINARY TRACT INFECTION): Status: RESOLVED | Noted: 2018-10-17 | Resolved: 2018-12-09

## 2018-12-09 PROBLEM — R79.89 AZOTEMIA: Status: RESOLVED | Noted: 2018-10-30 | Resolved: 2018-12-09

## 2018-12-09 PROBLEM — T38.0X5A CORTICOSTEROIDS ADVERSE REACTION: Status: RESOLVED | Noted: 2018-10-05 | Resolved: 2018-12-09

## 2018-12-09 PROBLEM — R06.01 SLEEPS IN SITTING POSITION DUE TO ORTHOPNEA: Status: ACTIVE | Noted: 2018-12-09

## 2018-12-09 PROBLEM — N17.9 ACUTE RENAL FAILURE: Status: RESOLVED | Noted: 2018-10-11 | Resolved: 2018-12-09

## 2018-12-09 LAB
ALBUMIN SERPL BCP-MCNC: 2.9 G/DL
ALP SERPL-CCNC: 173 U/L
ALT SERPL W/O P-5'-P-CCNC: 17 U/L
ANION GAP SERPL CALC-SCNC: 8 MMOL/L
ASCENDING AORTA: 2.78 CM
AST SERPL-CCNC: 17 U/L
AV INDEX (PROSTH): 0.76
AV MEAN GRADIENT: 2.98 MMHG
AV PEAK GRADIENT: 4.84 MMHG
AV VALVE AREA: 2.29 CM2
BASOPHILS # BLD AUTO: 0.05 K/UL
BASOPHILS NFR BLD: 1.5 %
BILIRUB SERPL-MCNC: 1.5 MG/DL
BSA FOR ECHO PROCEDURE: 1.57 M2
BUN SERPL-MCNC: 39 MG/DL
CALCIUM SERPL-MCNC: 8.5 MG/DL
CHLORIDE SERPL-SCNC: 102 MMOL/L
CO2 SERPL-SCNC: 30 MMOL/L
CREAT SERPL-MCNC: 0.9 MG/DL
CV ECHO LV RWT: 0.39 CM
DIFFERENTIAL METHOD: ABNORMAL
DOP CALC AO PEAK VEL: 1.1 M/S
DOP CALC AO VTI: 22.19 CM
DOP CALC LVOT AREA: 3.02 CM2
DOP CALC LVOT DIAMETER: 1.96 CM
DOP CALC LVOT STROKE VOLUME: 50.75 CM3
DOP CALCLVOT PEAK VEL VTI: 16.83 CM
E WAVE DECELERATION TIME: 158.75 MSEC
E/A RATIO: 0.84
E/E' RATIO: 7.16
ECHO LV POSTERIOR WALL: 0.7 CM (ref 0.6–1.1)
EOSINOPHIL # BLD AUTO: 0.1 K/UL
EOSINOPHIL NFR BLD: 1.8 %
ERYTHROCYTE [DISTWIDTH] IN BLOOD BY AUTOMATED COUNT: 15.5 %
EST. GFR  (AFRICAN AMERICAN): >60 ML/MIN/1.73 M^2
EST. GFR  (NON AFRICAN AMERICAN): >60 ML/MIN/1.73 M^2
FRACTIONAL SHORTENING: 32 % (ref 28–44)
GLUCOSE SERPL-MCNC: 98 MG/DL
HCT VFR BLD AUTO: 28.8 %
HGB BLD-MCNC: 8.9 G/DL
IMM GRANULOCYTES # BLD AUTO: 0.14 K/UL
IMM GRANULOCYTES NFR BLD AUTO: 4.1 %
INTERVENTRICULAR SEPTUM: 0.81 CM (ref 0.6–1.1)
IVRT: 0.1 MSEC
LA MAJOR: 4.94 CM
LA MINOR: 5.08 CM
LA WIDTH: 3.72 CM
LEFT ATRIUM SIZE: 3.21 CM
LEFT ATRIUM VOLUME INDEX: 32.4 ML/M2
LEFT ATRIUM VOLUME: 50.84 CM3
LEFT INTERNAL DIMENSION IN SYSTOLE: 2.42 CM (ref 2.1–4)
LEFT VENTRICLE DIASTOLIC VOLUME INDEX: 34.13 ML/M2
LEFT VENTRICLE DIASTOLIC VOLUME: 53.5 ML
LEFT VENTRICLE MASS INDEX: 45.8 G/M2
LEFT VENTRICLE SYSTOLIC VOLUME INDEX: 13.2 ML/M2
LEFT VENTRICLE SYSTOLIC VOLUME: 20.67 ML
LEFT VENTRICULAR INTERNAL DIMENSION IN DIASTOLE: 3.57 CM (ref 3.5–6)
LEFT VENTRICULAR MASS: 71.82 G
LV LATERAL E/E' RATIO: 5.67
LV SEPTAL E/E' RATIO: 9.71
LYMPHOCYTES # BLD AUTO: 0.5 K/UL
LYMPHOCYTES NFR BLD: 16 %
MAGNESIUM SERPL-MCNC: 2 MG/DL
MCH RBC QN AUTO: 28.9 PG
MCHC RBC AUTO-ENTMCNC: 30.9 G/DL
MCV RBC AUTO: 94 FL
MONOCYTES # BLD AUTO: 0.6 K/UL
MONOCYTES NFR BLD: 18.3 %
MV PEAK A VEL: 0.81 M/S
MV PEAK E VEL: 0.68 M/S
NEUTROPHILS # BLD AUTO: 2 K/UL
NEUTROPHILS NFR BLD: 58.3 %
NRBC BLD-RTO: 0 /100 WBC
PHOSPHATE SERPL-MCNC: 2.9 MG/DL
PISA TR MAX VEL: 2.54 M/S
PLATELET # BLD AUTO: 118 K/UL
PMV BLD AUTO: 10.3 FL
POCT GLUCOSE: 105 MG/DL (ref 70–110)
POCT GLUCOSE: 132 MG/DL (ref 70–110)
POCT GLUCOSE: 156 MG/DL (ref 70–110)
POCT GLUCOSE: 159 MG/DL (ref 70–110)
POCT GLUCOSE: 209 MG/DL (ref 70–110)
POCT GLUCOSE: 374 MG/DL (ref 70–110)
POCT GLUCOSE: 406 MG/DL (ref 70–110)
POCT GLUCOSE: 77 MG/DL (ref 70–110)
POCT GLUCOSE: 98 MG/DL (ref 70–110)
POTASSIUM SERPL-SCNC: 4 MMOL/L
PROT SERPL-MCNC: 5.1 G/DL
RA MAJOR: 4.02 CM
RA PRESSURE: 3 MMHG
RA WIDTH: 3.12 CM
RBC # BLD AUTO: 3.08 M/UL
RIGHT VENTRICULAR END-DIASTOLIC DIMENSION: 3.51 CM
RV TISSUE DOPPLER FREE WALL SYSTOLIC VELOCITY 1 (APICAL 4 CHAMBER VIEW): 10.06 M/S
SINUS: 3.06 CM
SODIUM SERPL-SCNC: 140 MMOL/L
STJ: 2.65 CM
TACROLIMUS BLD-MCNC: 7.1 NG/ML
TDI LATERAL: 0.12
TDI SEPTAL: 0.07
TDI: 0.1
TR MAX PG: 25.81 MMHG
TRICUSPID ANNULAR PLANE SYSTOLIC EXCURSION: 1.57 CM
TV REST PULMONARY ARTERY PRESSURE: 28.81 MMHG
WBC # BLD AUTO: 3.38 K/UL

## 2018-12-09 PROCEDURE — A4217 STERILE WATER/SALINE, 500 ML: HCPCS | Performed by: NURSE PRACTITIONER

## 2018-12-09 PROCEDURE — 25000003 PHARM REV CODE 250: Performed by: NURSE PRACTITIONER

## 2018-12-09 PROCEDURE — 84100 ASSAY OF PHOSPHORUS: CPT

## 2018-12-09 PROCEDURE — 25000003 PHARM REV CODE 250: Performed by: STUDENT IN AN ORGANIZED HEALTH CARE EDUCATION/TRAINING PROGRAM

## 2018-12-09 PROCEDURE — 63600175 PHARM REV CODE 636 W HCPCS: Performed by: TRANSPLANT SURGERY

## 2018-12-09 PROCEDURE — 63600175 PHARM REV CODE 636 W HCPCS: Performed by: PHYSICIAN ASSISTANT

## 2018-12-09 PROCEDURE — 63600175 PHARM REV CODE 636 W HCPCS: Performed by: NURSE PRACTITIONER

## 2018-12-09 PROCEDURE — 25000003 PHARM REV CODE 250: Performed by: TRANSPLANT SURGERY

## 2018-12-09 PROCEDURE — 85025 COMPLETE CBC W/AUTO DIFF WBC: CPT

## 2018-12-09 PROCEDURE — 83735 ASSAY OF MAGNESIUM: CPT

## 2018-12-09 PROCEDURE — 80053 COMPREHEN METABOLIC PANEL: CPT

## 2018-12-09 PROCEDURE — 25000003 PHARM REV CODE 250: Performed by: PHYSICIAN ASSISTANT

## 2018-12-09 PROCEDURE — 99232 SBSQ HOSP IP/OBS MODERATE 35: CPT | Mod: ,,, | Performed by: NURSE PRACTITIONER

## 2018-12-09 PROCEDURE — A4216 STERILE WATER/SALINE, 10 ML: HCPCS | Performed by: TRANSPLANT SURGERY

## 2018-12-09 PROCEDURE — 99233 SBSQ HOSP IP/OBS HIGH 50: CPT | Mod: 24,,, | Performed by: NURSE PRACTITIONER

## 2018-12-09 PROCEDURE — 94664 DEMO&/EVAL PT USE INHALER: CPT

## 2018-12-09 PROCEDURE — 20600001 HC STEP DOWN PRIVATE ROOM

## 2018-12-09 PROCEDURE — 80197 ASSAY OF TACROLIMUS: CPT

## 2018-12-09 PROCEDURE — 94761 N-INVAS EAR/PLS OXIMETRY MLT: CPT

## 2018-12-09 PROCEDURE — 99900035 HC TECH TIME PER 15 MIN (STAT)

## 2018-12-09 RX ORDER — VALGANCICLOVIR 450 MG/1
450 TABLET, FILM COATED ORAL 2 TIMES DAILY
Status: DISCONTINUED | OUTPATIENT
Start: 2018-12-09 | End: 2018-12-21 | Stop reason: HOSPADM

## 2018-12-09 RX ORDER — INSULIN ASPART 100 [IU]/ML
6 INJECTION, SOLUTION INTRAVENOUS; SUBCUTANEOUS
Status: DISCONTINUED | OUTPATIENT
Start: 2018-12-09 | End: 2018-12-10

## 2018-12-09 RX ORDER — INSULIN ASPART 100 [IU]/ML
4 INJECTION, SOLUTION INTRAVENOUS; SUBCUTANEOUS
Status: DISCONTINUED | OUTPATIENT
Start: 2018-12-09 | End: 2018-12-09

## 2018-12-09 RX ORDER — ACETAZOLAMIDE 500 MG/5ML
500 INJECTION, POWDER, LYOPHILIZED, FOR SOLUTION INTRAVENOUS ONCE
Status: DISCONTINUED | OUTPATIENT
Start: 2018-12-09 | End: 2018-12-09

## 2018-12-09 RX ADMIN — ACETAZOLAMIDE SODIUM 500 MG: 500 INJECTION, POWDER, LYOPHILIZED, FOR SOLUTION INTRAVENOUS at 12:12

## 2018-12-09 RX ADMIN — SODIUM CHLORIDE 4 UNITS/HR: 9 INJECTION, SOLUTION INTRAVENOUS at 04:12

## 2018-12-09 RX ADMIN — ATOVAQUONE 1500 MG: 750 SUSPENSION ORAL at 08:12

## 2018-12-09 RX ADMIN — OCTREOTIDE ACETATE 50 MCG: 100 INJECTION, SOLUTION INTRAVENOUS; SUBCUTANEOUS at 04:12

## 2018-12-09 RX ADMIN — ENTECAVIR 0.5 MG: 0.5 TABLET ORAL at 08:12

## 2018-12-09 RX ADMIN — Medication 10 ML: at 05:12

## 2018-12-09 RX ADMIN — HEPARIN SODIUM 5000 UNITS: 5000 INJECTION, SOLUTION INTRAVENOUS; SUBCUTANEOUS at 08:12

## 2018-12-09 RX ADMIN — PANTOPRAZOLE SODIUM 40 MG: 40 TABLET, DELAYED RELEASE ORAL at 08:12

## 2018-12-09 RX ADMIN — VALGANCICLOVIR 450 MG: 450 TABLET, FILM COATED ORAL at 01:12

## 2018-12-09 RX ADMIN — Medication 10 ML: at 06:12

## 2018-12-09 RX ADMIN — DIBASIC SODIUM PHOSPHATE, MONOBASIC POTASSIUM PHOSPHATE AND MONOBASIC SODIUM PHOSPHATE 2 TABLET: 852; 155; 130 TABLET ORAL at 08:12

## 2018-12-09 RX ADMIN — TACROLIMUS 1 MG: 1 CAPSULE ORAL at 08:12

## 2018-12-09 RX ADMIN — MAGNESIUM OXIDE TAB 400 MG (241.3 MG ELEMENTAL MG) 800 MG: 400 (241.3 MG) TAB at 08:12

## 2018-12-09 RX ADMIN — TACROLIMUS 1 MG: 1 CAPSULE ORAL at 05:12

## 2018-12-09 RX ADMIN — CALCIUM GLUCONATE: 94 INJECTION, SOLUTION INTRAVENOUS at 09:12

## 2018-12-09 RX ADMIN — INSULIN ASPART 4 UNITS: 100 INJECTION, SOLUTION INTRAVENOUS; SUBCUTANEOUS at 01:12

## 2018-12-09 RX ADMIN — METOPROLOL TARTRATE 25 MG: 25 TABLET ORAL at 08:12

## 2018-12-09 RX ADMIN — VALGANCICLOVIR 450 MG: 450 TABLET, FILM COATED ORAL at 08:12

## 2018-12-09 RX ADMIN — OCTREOTIDE ACETATE 50 MCG: 100 INJECTION, SOLUTION INTRAVENOUS; SUBCUTANEOUS at 08:12

## 2018-12-09 RX ADMIN — ASPIRIN 81 MG CHEWABLE TABLET 81 MG: 81 TABLET CHEWABLE at 08:12

## 2018-12-09 RX ADMIN — Medication 10 ML: at 12:12

## 2018-12-09 RX ADMIN — VANCOMYCIN HYDROCHLORIDE 750 MG: 750 INJECTION, POWDER, LYOPHILIZED, FOR SOLUTION INTRAVENOUS at 06:12

## 2018-12-09 RX ADMIN — INSULIN ASPART 6 UNITS: 100 INJECTION, SOLUTION INTRAVENOUS; SUBCUTANEOUS at 05:12

## 2018-12-09 NOTE — NURSING
"- Patient c/o itching to bilateral arms - patient's  concerned she is having a "medication reaction", however all of her evening medications from tonight are unchanged from the past few days.  - No hives seen on arms; VS WNL; patient is not wheezing, just annoyed by the itching.  - No PRN medications available for this.  - Notified Dr. Grajeda who ordered 25 mg Benadryl PO x 1, now. Administered.  "

## 2018-12-09 NOTE — PROGRESS NOTES
- Patient's  concerned about consecutive BG readings >300; requesting RN call MD. Have paged Endocrinology to discuss with them.  - Insulin drip is at 1.6 Units/hr.  - Patient is type 2 diabetic with TPN infusing at 50 mL/hr; regular diet now and is eating. BG monitoring q4h. Language barrier at times an issue with pre-meal Accu-chek reminders.    Results for MERCEDES PATHAK (MRN [id number])   Ref. Range 12/8/2018 13:45 12/8/2018 17:57 12/8/2018 20:02   POCT Glucose Latest Ref Range: 70 - 110 mg/dL 415 (H) 315 (H) 324 (H)     Addendum 2025: Received call back from Dr. Kelly who stated to increase patient's insulin infusion to 2.3 Units/hr and continue q4h monitoring schedule. Day team to reassess insulin regimen in the morning.

## 2018-12-09 NOTE — NURSING
Pt  requesting pt's BG be taken. BG taken as requested and was 374. Notified SCOT Caldera NP, bolus of 4U via pump ordered and administered & mealtime insulin increased to 6U.

## 2018-12-09 NOTE — PROGRESS NOTES
"[facility]  Endocrinology  Progress Note    Admit Date: 10/1/2018     Reason for Consult: Management of type 2 DM, Hyperglycemia      Surgical Procedure and Date: Liver transplant 10/5/18, Ex lap 10/6/18     Diabetes diagnosis year:      Home Diabetes Medications:  Lantus 18 units HS and novolog 17 units with meals plus correction scale (150-200 +1)        Lab Results   Component Value Date     HGBA1C 6.8 (H) 2018        How often checking glucose at home? 3-4   BG readings on regimen: 120-150.  Post prandial readings as high as upper 200s  Hypoglycemia on the regimen? yes, none recently   Missed doses on regimen?  No     Diabetes Complications include:     Diabetic peripheral neuropathy      Complicating diabetes co morbidities:   CIRRHOSIS        HPI:  Patient is a 68 y.o. female with a diagnosis of ESLD, listed for liver transplant with meld 23 who underwent live transplant on 10/5/18.  Back to OR on 10/6/18 for ex lap.  Admitted 10/1/18 for hyponatremia s/p paracentesis.  Personal has known diagnosis of diabetes, endocrine consulted for diabetes management.    Post-operative course complicated by ESBL Klebsiella pneumoniae in urine (in contact isolation), peritransplant ascites, worsening renal function (required temporary dialysis), anemia (multiple blood transfusions), and possible GI bleed.     Interval HPI:   Overnight events: Remains in TSU. BG variable yesterday and overnight; insulin infusion rates 1.1-2.3 u/hr. Diet advanced.   Eatin%  Nausea: No  Hypoglycemia and intervention: No  Fever: No  TPN  At 50 cc/hr     /77   Pulse 85   Temp 97.7 °F (36.5 °C) (Oral)   Resp 14   Ht 5' 3" (1.6 m)   Wt 55.6 kg (122 lb 9.2 oz)   LMP  (LMP Unknown)   SpO2 98%   Breastfeeding? No   BMI 21.71 kg/m²      Labs Reviewed and Include    Recent Labs   Lab 18  0444   GLU 98   CALCIUM 8.5*   ALBUMIN 2.9*   PROT 5.1*      K 4.0   CO2 30*      BUN 39* "   CREATININE 0.9   ALKPHOS 173*   ALT 17   AST 17   BILITOT 1.5*     Lab Results   Component Value Date    WBC 3.38 (L) 12/09/2018    HGB 8.9 (L) 12/09/2018    HCT 28.8 (L) 12/09/2018    MCV 94 12/09/2018     (L) 12/09/2018     No results for input(s): TSH, FREET4 in the last 168 hours.  Lab Results   Component Value Date    HGBA1C 6.0 (H) 10/04/2018       Nutritional status:   Body mass index is 21.71 kg/m².  Lab Results   Component Value Date    ALBUMIN 2.9 (L) 12/09/2018    ALBUMIN 2.9 (L) 12/08/2018    ALBUMIN 2.9 (L) 12/07/2018     Lab Results   Component Value Date    PREALBUMIN 15 (L) 11/03/2018    PREALBUMIN 12 (L) 09/17/2018       Estimated Creatinine Clearance: 48.8 mL/min (based on SCr of 0.9 mg/dL).    Accu-Checks  Recent Labs     12/08/18  0404 12/08/18  0854 12/08/18  1345 12/08/18  1757 12/08/18  2002 12/08/18  2133 12/09/18  0002 12/09/18  0430 12/09/18  0603 12/09/18  0816   POCTGLUCOSE 118* 196* 415* 315* 324* 263* 159* 105 132* 209*       Current Medications and/or Treatments Impacting Glycemic Control  Immunotherapy:    Immunosuppressants         Stop Route Frequency     tacrolimus capsule 1 mg      -- Oral 2 times daily        Steroids:   Hormones (From admission, onward)    Start     Stop Route Frequency Ordered    12/03/18 1400  octreotide injection 50 mcg      -- SubQ Every 8 hours 12/03/18 1116    10/17/18 0945  predniSONE tablet 15 mg      10/24 0859 Oral Daily 10/17/18 0943        Pressors:    Autonomic Drugs (From admission, onward)    Start     Stop Route Frequency Ordered    10/06/18 1617  rocuronium (ZEMURON) 10 mg/mL injection     Comments:  Created by cabinet override    10/07 0429   10/06/18 1617        Hyperglycemia/Diabetes Medications:   Antihyperglycemics (From admission, onward)    Start     Stop Route Frequency Ordered    12/05/18 1115  insulin regular (Humulin R) 100 Units in sodium chloride 0.9% 100 mL infusion      -- IV Continuous 12/05/18 1012    12/05/18 1111   insulin aspart U-100 pen 0-4 Units      -- SubQ As needed (PRN) 12/05/18 1012    10/07/18 1200  insulin regular (Humulin R) 100 Units in sodium chloride 0.9% 100 mL infusion      10/13 2100 IV Continuous 10/07/18 1055          ASSESSMENT and PLAN    * Liver transplanted    Managed per primary team  Avoid hypoglycemia    Recent Labs   Lab 12/08/18  0414   ALT 18   AST 18   ALKPHOS 138*   BILITOT 1.5*   PROT 5.0*   ALBUMIN 2.9*            Type 2 diabetes mellitus with diabetic polyneuropathy, with long-term current use of insulin    BG goal 140-180    rewrite transition drip at rate of 1.4 with Low SQ Correction scale.  POC glucose q4 hr given finger sticks - coordinate with meals during the day  Start novolog 4 units with meals.     ADDENDUM:  at lunch. Refused clear liquid tray. Patient ate outside food and hospital food without insulin administration. Will re-evaluate at dinner as BG was 98 this AM on labs and likely related to mealtime needs.         Discharge planning:  TBD       Sequelae of hyperalimentation    Likely elevating BG.        Other ascites    Paracentesis prn per primary team.  Avoid hypoglycemia  TPN started- NPO     UTI (urinary tract infection)-resolved as of 12/9/2018    Infection may increase insulin resistance.          Corticosteroids adverse reaction-resolved as of 12/9/2018    On steroid taper per transplant team; may elevate BG readings         Prophylactic immunotherapy    May increase insulin resistance.          Severe malnutrition    On TPN.          Aviva Caldera, NP  Endocrinology  [facility]

## 2018-12-09 NOTE — ASSESSMENT & PLAN NOTE
- Stopped Cellcept, Bactrim, and Valcyte 10/31   - Cellcept half dose resumed 11/6  - Started Atovaquone 11/6.   - CMV PCR noted to be positive at 129.Helld off on treatment with valcyte, however weekly CMV PCRs increasing, start treatment dose 12/9/18  - Cellcept held 11/23 2/2 infection (UTI + wound)  - Continue to monitor.

## 2018-12-09 NOTE — SUBJECTIVE & OBJECTIVE
"Interval HPI:   Overnight events: Remains in TSU. BG variable yesterday and overnight; insulin infusion rates 1.1-2.3 u/hr. Diet advanced.   Eatin%  Nausea: No  Hypoglycemia and intervention: No  Fever: No  TPN  At 50 cc/hr     /77   Pulse 85   Temp 97.7 °F (36.5 °C) (Oral)   Resp 14   Ht 5' 3" (1.6 m)   Wt 55.6 kg (122 lb 9.2 oz)   LMP  (LMP Unknown)   SpO2 98%   Breastfeeding? No   BMI 21.71 kg/m²     Labs Reviewed and Include    Recent Labs   Lab 18  0444   GLU 98   CALCIUM 8.5*   ALBUMIN 2.9*   PROT 5.1*      K 4.0   CO2 30*      BUN 39*   CREATININE 0.9   ALKPHOS 173*   ALT 17   AST 17   BILITOT 1.5*     Lab Results   Component Value Date    WBC 3.38 (L) 2018    HGB 8.9 (L) 2018    HCT 28.8 (L) 2018    MCV 94 2018     (L) 2018     No results for input(s): TSH, FREET4 in the last 168 hours.  Lab Results   Component Value Date    HGBA1C 6.0 (H) 10/04/2018       Nutritional status:   Body mass index is 21.71 kg/m².  Lab Results   Component Value Date    ALBUMIN 2.9 (L) 2018    ALBUMIN 2.9 (L) 2018    ALBUMIN 2.9 (L) 2018     Lab Results   Component Value Date    PREALBUMIN 15 (L) 2018    PREALBUMIN 12 (L) 2018       Estimated Creatinine Clearance: 48.8 mL/min (based on SCr of 0.9 mg/dL).    Accu-Checks  Recent Labs     18  0404 18  0854 18  1345 18  1757 18  2002 18  2133 18  0002 18  0430 18  0603 18  0816   POCTGLUCOSE 118* 196* 415* 315* 324* 263* 159* 105 132* 209*       Current Medications and/or Treatments Impacting Glycemic Control  Immunotherapy:    Immunosuppressants         Stop Route Frequency     tacrolimus capsule 1 mg      -- Oral 2 times daily        Steroids:   Hormones (From admission, onward)    Start     Stop Route Frequency Ordered    18 1400  octreotide injection 50 mcg      -- SubQ Every 8 hours 18 1116    10/17/18 " 0945  predniSONE tablet 15 mg      10/24 0859 Oral Daily 10/17/18 0943        Pressors:    Autonomic Drugs (From admission, onward)    Start     Stop Route Frequency Ordered    10/06/18 1617  rocuronium (ZEMURON) 10 mg/mL injection     Comments:  Created by cabinet override    10/07 0429   10/06/18 1617        Hyperglycemia/Diabetes Medications:   Antihyperglycemics (From admission, onward)    Start     Stop Route Frequency Ordered    12/05/18 1115  insulin regular (Humulin R) 100 Units in sodium chloride 0.9% 100 mL infusion      -- IV Continuous 12/05/18 1012    12/05/18 1111  insulin aspart U-100 pen 0-4 Units      -- SubQ As needed (PRN) 12/05/18 1012    10/07/18 1200  insulin regular (Humulin R) 100 Units in sodium chloride 0.9% 100 mL infusion      10/13 2100 IV Continuous 10/07/18 1055

## 2018-12-09 NOTE — PROGRESS NOTES
[facility]  Liver Transplant  Progress Note    Patient Name: Scarlett Reddy  MRN: [id number]  Admission Date: 10/1/2018  Hospital Length of Stay: 68 days  Code Status: Full Code  Primary Care Provider: Primary Doctor No  Post-Operative Day: 65    ORGAN:   LIVER  Disease Etiology: Cirrhosis: Type B and D  Donor Type:    - Brain Death  CDC High Risk:   No  Donor CMV Status:   Donor CMV Status: Positive  Donor HBcAB:   Negative  Donor HCV Status:   Negative  Donor HBV SETH: Negative  Donor HCV SETH: Negative  Whole or Partial: Whole Liver  Biliary Anastomosis: End to End  Arterial Anatomy: Replaced Left Hepatic and Right Hepatic  Subjective:     History of Present Illness:  Ms. Reddy is a 69 y/o female with PMH of ESLD secondary Hep B and D.  Listed for liver transplant with MELD 23.  Paracentesis 10/1 with 5L removed, no fluid sent for analysis.  Morning labs significant for hyponatremia, Na 119.  Pt admitted to LTS for sodium monitoring.        Hospital Course:  Hospital Course: 69 y/o F h/o HBV/delta cirrhosis s/p DBDLT 10/5/18 (CMV D+/R+, steroid induction, MMF/tacro/pred maintenance) with take back on 10/6 2/2 hyperbilirubinema and bilious VALORIE drainage, no leak identified. Post-op course c/b hypercapneic and hypoxic respiratory failure and was reintubated though quickly extubated now doing well. Liver bx (10/6) sig for Zone 3 hemorrhage and collapse, in addition to cholestasis. Transferred from ICU on POD #4. ID consulted to comment on positive diphtheroid culture from ascitic fluid (likely skin colonization) as well as ESBL Klebsiella pneumoniae in urine culture (10/4).  Pt asymptomatic and cell count from ascitic fluid unremarkable. Per ID recs, no need to treat diphtheroids or asymptomatic ESBL Kleb pneumo bacteriuria though pt has had 6 days of therapy which would cover these organisms. Mild peritransplant ascites- repeat US 10/6 with increased arterial resistive indices. Repeat US 10/9  w/ continued elevation in RIs and fluid collections. LFTs trending down nicely.     Acute gradual worsening of renal function s/p OLTx. Creatinine on arrival 0.8 and has been up trending. Nephrology consulted for post-op EVA. Cr cont to trend up but remains to have good UOP. Had stable response to lasiz diuresis.  BUN elevated requiring multiple rounds of dialysis (10/15, 10/17, 10/20, 10/31). No significant improvements in kidney function noted.   In addition, pt H/H cont to fluctuate requiring multiple blood transfusions (10/14, 10/16, 10/18, 10/19, 10/22, 10/27, 10/29).  Pt reported dark stools 10/15 but color has normalized. FOBT+. EGD 10/18 consistent with ulcerated mucosa in duodenum s/p coagulation. Concern for H. Pylori. Started Amoxicillin/levofloxacin (10/19, complete 11/2)). Remains on Protonix 40 mg BID. Biopsy negative for infection and CMV. Will cont close monitoring H/H, transfuse as needed with goal Hb > 7.0. Of note, Urine cx + Klebsiella pneumoniae (10/13). Received 3 day course of ciprofloxacin, dc'd 10/19.   On 10/19 pm pt c/o crushing chest pain. Troponin 0.094, likely 2/2 ischemic stress. EKG NSR.   Liver US 11/6 showed no arterial flow. Paracentesis 11/7 with 8.9 L removed. Wbc 39, segs 16%. Gram stain no organisms seen + no WBC. Pt feels better since having fluid removed but she continues to have worsening abdominal distention & LE edema.Repeat Liver US 11/8 showed arterial flow with increased RIs. Paracentesis 11/12 with 8.6L removed (WBC 34, segs 25%). 11/15 liver u/s showed abnormally decreased main hepatic artery peak systolic velocity with slow arterial flow and abnormal tardus parvus waveforms intrahepatically. Consulted vascular surgery. Repeat liver US 11/19 showed no arterial flow on R and sluggish flow on L. Hepatic angiogram 11/19 confirmed HAT.  Friday 11/16, purulent drainage noted from middle of chevron incision. Area opened and packed. Cx grew klebsiella p. CT scan showed no  signs of infection. On Monday 11/18, it was noted that the right/lateral side of the chevron had 3 small openings draining purulent fluid. Opened the area and packed. Vac placed on 11/24. Wound care following. CT abd/pelvis 11/3 showed small volume abdominal ascites, improved compared to prior, pelvis ascites unchanged from prior, unchanged b/l pleural effusions (large on R, moderate on L) with bibasilar compressive atelectasis and consolidative changes. Patient finished day 7 of 7 of Ertapenem for kleb pneumo ESBL UTI 11/29. Of note, pt grew same bacteria from wound 11/16. Liver US to assess recanalization post HAT 12/4 -> conclusion that pt may recanalize. Will monitor closely with no further intervention planned at this time.    Interval history: No acute events overnight. TPN + octreotide started 12/3. Peritoneal fluid (perc drain) continues to be yellow milky in nature.  Will continue w/ TPN + advance diet (low fate). Cr fx and LFTs stable. Will continue to monitor. H/H stable. Pt ambulating with assistance and OOBTC often. Peritoneal fluid 11/29 w/ enterococcus. Per ID, treating with Vanc x 10 days and then repeat peritoneal fluid cx. Wound vac changed 12/7, w/ improvements noted.  Per wound care, will stop vac next dressing change because wound depth has improved to 0.5 cm.  Complaining of not sleeping well at night due to shortness of breath.  Obtain chest xray and echo, administer diamox 500mg.    Scheduled Meds:   acetaZOLAMIDE  500 mg Intravenous Once    aspirin  81 mg Oral Daily    atovaquone  1,500 mg Oral Daily    entecavir  0.5 mg Oral Daily    heparin (porcine)  5,000 Units Subcutaneous Q12H    insulin aspart U-100  4 Units Subcutaneous TIDWM    k phos di & mono-sod phos mono  2 tablet Oral BID    magnesium oxide  800 mg Oral BID    metoprolol tartrate  25 mg Oral BID    octreotide  50 mcg Subcutaneous Q8H    pantoprazole  40 mg Oral BID    sodium chloride 0.9%  10 mL Intravenous Q6H     tacrolimus  1 mg Oral BID     Continuous Infusions:   insulin (HUMAN R) infusion (adults) 1.4 Units/hr (12/09/18 1001)    TPN ADULT CENTRAL LINE CUSTOM 50 mL/hr at 12/08/18 2115     PRN Meds:sodium chloride, sodium chloride, sodium chloride, acetaminophen, bisacodyl, calcium carbonate, dextrose 50%, dextrose 50%, glucagon (human recombinant), glucose, glucose, insulin aspart U-100, omnipaque, ondansetron, ondansetron, polyethylene glycol, Flushing PICC Protocol **AND** sodium chloride 0.9% **AND** sodium chloride 0.9%, sodium chloride 0.9%, traMADol    Review of Systems   Constitutional: Positive for activity change and appetite change.   Respiratory: Positive for shortness of breath.    Cardiovascular: Negative for chest pain, palpitations and leg swelling.   Gastrointestinal: Positive for abdominal pain.   Allergic/Immunologic: Positive for immunocompromised state.   Psychiatric/Behavioral: Positive for sleep disturbance.     Objective:     Vital Signs (Most Recent):  Temp: 97.7 °F (36.5 °C) (12/09/18 0750)  Pulse: 85 (12/09/18 0750)  Resp: 14 (12/09/18 0750)  BP: 137/77 (12/09/18 0750)  SpO2: 98 % (12/09/18 0415) Vital Signs (24h Range):  Temp:  [97.7 °F (36.5 °C)-98.7 °F (37.1 °C)] 97.7 °F (36.5 °C)  Pulse:  [] 85  Resp:  [14-19] 14  SpO2:  [95 %-98 %] 98 %  BP: (116-137)/(73-79) 137/77     Weight: 55.6 kg (122 lb 9.2 oz)  Body mass index is 21.71 kg/m².    Intake/Output - Last 3 Shifts       12/07 0700 - 12/08 0659 12/08 0700 - 12/09 0659 12/09 0700 - 12/10 0659    P.O. 455 360     I.V. (mL/kg) 51.2 (0.9) 41.9 (0.8) 2.8 (0)    IV Piggyback 250 250     TPN 1147.5 965 300    Total Intake(mL/kg) 1903.7 (34.3) 1616.9 (29.1) 302.8 (5.4)    Urine (mL/kg/hr) 0 (0) 0 (0)     Drains 300 325     Stool 0      Total Output 300 325     Net +1603.7 +1291.9 +302.8           Urine Occurrence 3 x 3 x     Stool Occurrence 0 x 0 x           Physical Exam   Constitutional: She is oriented to person, place, and time.   Hand  and temporal muscle wasting   HENT:   Head: Normocephalic.   Eyes: Scleral icterus is present.   Neck: Normal range of motion.   Cardiovascular: Normal rate, regular rhythm and normal heart sounds.   Pulmonary/Chest: Effort normal.   Abdominal: Soft. She exhibits ascites.       Musculoskeletal: Normal range of motion. She exhibits edema.   Neurological: She is alert and oriented to person, place, and time.   Skin: Skin is warm and dry.   Psychiatric: She has a normal mood and affect. Her behavior is normal.   Nursing note and vitals reviewed.      Laboratory:  Immunosuppressants         Stop Route Frequency     tacrolimus capsule 1 mg      -- Oral 2 times daily        CBC:   Recent Labs   Lab 12/09/18  0444   WBC 3.38*   RBC 3.08*   HGB 8.9*   HCT 28.8*   *   MCV 94   MCH 28.9   MCHC 30.9*     CMP:   Recent Labs   Lab 12/09/18  0444   GLU 98   CALCIUM 8.5*   ALBUMIN 2.9*   PROT 5.1*      K 4.0   CO2 30*      BUN 39*   CREATININE 0.9   ALKPHOS 173*   ALT 17   AST 17   BILITOT 1.5*     Coagulation: No results for input(s): PT, INR, APTT in the last 168 hours.  Labs within the past 24 hours have been reviewed.    Diagnostic Results:  None    Assessment/Plan:     * Liver transplanted    - PMHx of ESLD secondary to hep B cirrhosis, now s/p liver transplant on 10/5, with takeback for hyperbilirubinemia and bilious VALORIE output 10/6; no biliary leak identified.   - POD 1 US: increased arterial resistive indices, suggestive of edema vs acute rejection vs congestion.  - Repeat US 10/9 with continued elevation of RIs.  - LFTs stable.  - T bili trending down.   - Repeat liver US (10/11): elevated RIs.  - Liver US 11/6 to assess for ascites. No arterial flow within the liver allograft concerning for interval thrombosis/occlusion of the arterial system. Severe ascites seen.  - Liver US 11/8: arterial flow seen w/ elevated RIs   - LFTs remain stable. No CTA at this time due to EVA and normal liver function.   -  "Liver US 11/15 showed "abnormally decreased main hepatic artery peak systolic velocity with slow arterial flow and abnormal tardus parvus waveforms intrahepatically."   -Vascular surgery consulted  - Liver US 11/19 showed no arterial flow on the right and sluggish flow on the left.   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 No further intervention planned at this time as patient's LFTs are stable. Will monitor.  - CT abd/pelvis 11/26 showed: 1. Persistent large volume of ascites. 2. Large right and moderate left pleural effusions with adjacent basilar atelectasis. 3. Anasarca  - IR placed perc drain 11/29 due to suspicion for chyle leak. 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  - Fluid also growing enterococcus and will plan for treatment with Vanc x 10 days per ID recs. Will need repeat fluid cx post treatment.       Other ascites    - Paracentesis 10/1, 5L removed, no fluid sent for analysis.  - Pt remains volume overloaded.   - Liver US 11/6 with severe ascites.   - Paracentesis 11/7 with 8.9 L removed. Wbc 39, segs 16%. Gram stain - no organisms seen and no WBC. Cultures NGTD.  - Paracentesis 11/12: 8.6 L removed. 34 wbc, 25% segs, 33 lymphs, 42 monocytes/macrophages. Cultures NGTD  - Paracentesis 11/21 with 4 L removed, neg for infection.  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 Milky appearance, cell count of fluid (51% lymphs), triglycerides 2208, consistent with chyle leak.   - Peritoneal fluid 11/29 growing enterococcus and will plan for treatment with Vanc x 10 days per ID recs.   - Attempted low fat, high protein diet due to suspicion for chyle leak with no improvement seen in fluid character.  - CT abd/pelvis 12/3 shows small volume abdominal ascites, improved compared to prior, pelvis ascites unchanged from prior, unchanged b/l pleural effusions (large on R, moderate on L) with bibasilar compressive " atelectasis and consolidative changes.  - Placed PICC line and started TPN + octreotide 12/3. Improvement in color of fluid seen 12/6 - 12/7 (less milky, more abbie). Will continue with TPN + adv diet to low fat at this time.  - Continue to monitor.  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  - Tx w/ Entecavir- dose changed to daily 11/26 due to improved renal fx  - 12/3 Hep B surface antigen negative 12/3, surface AB quant 571, and Hep B DNA <10.  - Continue to monitor, per protocol.     Type 2 diabetes mellitus with diabetic polyneuropathy, with long-term current use of insulin    - Continue home regimen.  - TPN started 12/3 and pt now on insulin drip.  - Endocrine following. Appreciate recs.       Sleeps in sitting position due to orthopnea    - obtain chest xray and echo  - diamox 500mg iv x 1       Hypoalbuminemia    - Continue to monitor.     Sequelae of hyperalimentation    - Monitor.       Pleural effusion    - Common post liver transplant  - CT 11/26 showed: Large right and moderate left pleural effusions with adjacent basilar atelectasis.  - Pt sp02 stable on RA, but c/o some orthopnea.  - CT abd/pelvis 12/3 showed unchanged b/l pleural effusions (large on R, moderate on L) with bibasilar compressive atelectasis and consolidative changes.  - Sp02 stable & no signs of respiratory infection. Will continue to monitor.       Hypomagnesemia    - Continue PO Mag Oxide  - Continue to monitor.       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       Delayed surgical wound healing    - Middle chevron incision opened 11/16 with purulent drainage  - Packed with Aquacel ag at this time. Growing Klebsiella p.   - CT abd/pelvis 11/17- no signs of infection  - 11/17: R side of chevron incision w/ 3  "small holes draining purulent fluid, opened and packed w/ aquacel.   - Wound care following.  - Wound w/ increased drainage, so wound care placed vac 11/24/18.  Apprec recs. Extending Ertapenem thru 11/28 per ID.    - Abdomen erythematous and indurated RLQ inferior to chevron.  - CT abd/pelvis 11/26 - no sign of infection  - Last wound vac change 12/7 with improvements noted in wound - depth now 0.5cm, so vac to be d/c'd at next dressing change, per wound care.    .       Hepatic artery thrombosis of transplanted liver    - Liver US 11/19 showed no arterial flow to R and sluggish flow to L  - Hepatic angiogram 11/19 confirmed HAT  - Liver US 12/4 to evaluate for recanalization reviewed. Recanalization possible. Will continue to monitor.  - No further intervention planned at this time. Will continue to monitor.     Drug-induced pancytopenia    - Decreased plt, wbc, h/h  - See "leukopenia"  - Continue to monitor.     Thrombocytopenia, unspecified    - Monitor.       Leukopenia    - Stopped Cellcept, Bactrim, and Valcyte 10/31   - Cellcept half dose resumed 11/6  - Started Atovaquone 11/6.   - CMV PCR noted to be positive at 129.Helld off on treatment with valcyte, however weekly CMV PCRs increasing, start treatment dose 12/9/18  - Cellcept held 11/23 2/2 infection (UTI + wound)  - Continue to monitor.       Acute blood loss anemia    - Expected post transplant  - H&H cont to fluctuate.   - FOBT +.  - EGD 10/18 - showed diffuse bleeding with ulcerated duodenal mucosa.  - EGD 11/5 - improvement seen, no active bleeding.  - Resume heparin sq injections 11/6.   - Decrease heparin sq from TID to BID 11/15   - Heparin dc'd 11/17 then resumed a few days later 2/2 risk vs. Benefit  - Pt continues to have prolonged bleeding from heparin injection sites, decreased frequency to q12 from q8 as pt has been ambulating and up in chair frequently.  - See "anemia requiring transfusions."  - Continue to monitor.            At risk for " opportunistic infections    - Valcyte for CMV prophylaxis--> holding (10/31) for leukopenia. Pt will need weekly CMV PCR while discontinued.  - CMV PCR noted to be positive at 129. .  - CMV PCR 11/8: 105  - CMV PCR 11/15 with 89 copies  - CMV PCR 11/23 95 - increased from previous. Continue to hold Valcyte & monitor pt closely.  - CMV PCR 11/29 146  - CMV PCR 12/6 529 - start treatment dose valcyte 12/9/18  - Bactrim for PCP prophylaxis (MWF).--> holding (10/31) for leukopenia.   - Start Atovaquone 11/6.  - Fluconazole for fungal prophylaxis (first dose 10/6). D/c Fluc 11/16 bc pt had been on it for > 30 days post take back.       Long-term use of immunosuppressant medication    - Prograf: stopped 10/29. Resumed 11/2.  --> Will monitor for signs of toxic side effects, check daily troughs, and change meds accordingly.   - Prednisone: stopped 10/29. Started on Hydrocortisone. pred taper resumed.  - Cellcept: stopped 10/31 for leukopenia. Restarted Cellcept 500 mg bid, 11/6.  - Cellcept held 11/23 2/2 infection         Prophylactic immunotherapy    - See long term use immunosuppresion.       Corticosteroids adverse reaction    - Delayed wound healing, hyperglycemia.  - Monitor.       Physical deconditioning    - PT/OT consulted.  Progressing well w/ therapy.  - Recommend home health PT and rolling walker at discharge (orders placed 11/6).       Anemia requiring transfusions    - Fluctuating H&H.    - Transfusions: 10/14; 10/16; 10/18; 10/19; 10/22; 10/27; 10/29, 10/31, 11/3, 11/21, 11/29, 12/4  - FOBT +  - LDH elevated, Hapto < 10.  - EGD 10/18: ulcerated duodenal mucosa.   - Consulted GI about this ongoing issue and further need of another EGD.  - EGD 11/5 unremarkable.   - Iron studies 11/13:  95, TIBC 107, sat iron 89, transferrin 72, ferritin pending  - Hemolytic anemia workup 11/13: Haptoglobin 132, retic 3.9   - H/H dropped 12/4 - transfused 2u PRBC with good response  - No overt signs of bleeding.  -  Continue to monitor.           Severe malnutrition    - Dietary consulted.   - Temporal and distal extremity muscle wasting and edema.  - Encourage supplements and to increase nutritional intake.  - Per nephrology, pt to be on low protein diet.  - Prealbumin reviewed.   - Tolerating diet.  Denies n/v.    - Attempted low fat, high protein diet due to suspicion for chyle leak with no improvement seen in fluid character.  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 Adv diet approved by surgeon (in addition to) TPN 12/5  - Monitor.             VTE Risk Mitigation (From admission, onward)        Ordered     heparin (porcine) injection 5,000 Units  Every 12 hours      12/06/18 1058     IP VTE HIGH RISK PATIENT  Once      11/05/18 1145     Place sequential compression device  Until discontinued      10/05/18 0709          The patients clinical status was discussed at multidisplinary rounds, involving transplant surgery, transplant medicine, pharmacy, nursing, nutrition, and social work    Discharge Planning: not candidate at this time  No Patient Care Coordination Note on file.      Seble Tineo NP  Liver Transplant  [facility]

## 2018-12-09 NOTE — SUBJECTIVE & OBJECTIVE
Scheduled Meds:   acetaZOLAMIDE  500 mg Intravenous Once    aspirin  81 mg Oral Daily    atovaquone  1,500 mg Oral Daily    entecavir  0.5 mg Oral Daily    heparin (porcine)  5,000 Units Subcutaneous Q12H    insulin aspart U-100  4 Units Subcutaneous TIDWM    k phos di & mono-sod phos mono  2 tablet Oral BID    magnesium oxide  800 mg Oral BID    metoprolol tartrate  25 mg Oral BID    octreotide  50 mcg Subcutaneous Q8H    pantoprazole  40 mg Oral BID    sodium chloride 0.9%  10 mL Intravenous Q6H    tacrolimus  1 mg Oral BID     Continuous Infusions:   insulin (HUMAN R) infusion (adults) 1.4 Units/hr (12/09/18 1001)    TPN ADULT CENTRAL LINE CUSTOM 50 mL/hr at 12/08/18 2115     PRN Meds:sodium chloride, sodium chloride, sodium chloride, acetaminophen, bisacodyl, calcium carbonate, dextrose 50%, dextrose 50%, glucagon (human recombinant), glucose, glucose, insulin aspart U-100, omnipaque, ondansetron, ondansetron, polyethylene glycol, Flushing PICC Protocol **AND** sodium chloride 0.9% **AND** sodium chloride 0.9%, sodium chloride 0.9%, traMADol    Review of Systems   Constitutional: Positive for activity change and appetite change.   Respiratory: Positive for shortness of breath.    Cardiovascular: Negative for chest pain, palpitations and leg swelling.   Gastrointestinal: Positive for abdominal pain.   Allergic/Immunologic: Positive for immunocompromised state.   Psychiatric/Behavioral: Positive for sleep disturbance.     Objective:     Vital Signs (Most Recent):  Temp: 97.7 °F (36.5 °C) (12/09/18 0750)  Pulse: 85 (12/09/18 0750)  Resp: 14 (12/09/18 0750)  BP: 137/77 (12/09/18 0750)  SpO2: 98 % (12/09/18 0415) Vital Signs (24h Range):  Temp:  [97.7 °F (36.5 °C)-98.7 °F (37.1 °C)] 97.7 °F (36.5 °C)  Pulse:  [] 85  Resp:  [14-19] 14  SpO2:  [95 %-98 %] 98 %  BP: (116-137)/(73-79) 137/77     Weight: 55.6 kg (122 lb 9.2 oz)  Body mass index is 21.71 kg/m².    Intake/Output - Last 3 Shifts        12/07 0700 - 12/08 0659 12/08 0700 - 12/09 0659 12/09 0700 - 12/10 0659    P.O. 455 360     I.V. (mL/kg) 51.2 (0.9) 41.9 (0.8) 2.8 (0)    IV Piggyback 250 250     TPN 1147.5 965 300    Total Intake(mL/kg) 1903.7 (34.3) 1616.9 (29.1) 302.8 (5.4)    Urine (mL/kg/hr) 0 (0) 0 (0)     Drains 300 325     Stool 0      Total Output 300 325     Net +1603.7 +1291.9 +302.8           Urine Occurrence 3 x 3 x     Stool Occurrence 0 x 0 x           Physical Exam   Constitutional: She is oriented to person, place, and time.   Hand and temporal muscle wasting   HENT:   Head: Normocephalic.   Eyes: Scleral icterus is present.   Neck: Normal range of motion.   Cardiovascular: Normal rate, regular rhythm and normal heart sounds.   Pulmonary/Chest: Effort normal.   Abdominal: Soft. She exhibits ascites.       Musculoskeletal: Normal range of motion. She exhibits edema.   Neurological: She is alert and oriented to person, place, and time.   Skin: Skin is warm and dry.   Psychiatric: She has a normal mood and affect. Her behavior is normal.   Nursing note and vitals reviewed.      Laboratory:  Immunosuppressants         Stop Route Frequency     tacrolimus capsule 1 mg      -- Oral 2 times daily        CBC:   Recent Labs   Lab 12/09/18  0444   WBC 3.38*   RBC 3.08*   HGB 8.9*   HCT 28.8*   *   MCV 94   MCH 28.9   MCHC 30.9*     CMP:   Recent Labs   Lab 12/09/18  0444   GLU 98   CALCIUM 8.5*   ALBUMIN 2.9*   PROT 5.1*      K 4.0   CO2 30*      BUN 39*   CREATININE 0.9   ALKPHOS 173*   ALT 17   AST 17   BILITOT 1.5*     Coagulation: No results for input(s): PT, INR, APTT in the last 168 hours.  Labs within the past 24 hours have been reviewed.    Diagnostic Results:  None

## 2018-12-09 NOTE — ASSESSMENT & PLAN NOTE
- Paracentesis 10/1, 5L removed, no fluid sent for analysis.  - Pt remains volume overloaded.   - Liver US 11/6 with severe ascites.   - Paracentesis 11/7 with 8.9 L removed. Wbc 39, segs 16%. Gram stain - no organisms seen and no WBC. Cultures NGTD.  - Paracentesis 11/12: 8.6 L removed. 34 wbc, 25% segs, 33 lymphs, 42 monocytes/macrophages. Cultures NGTD  - Paracentesis 11/21 with 4 L removed, neg for infection.  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 Milky appearance, cell count of fluid (51% lymphs), triglycerides 2208, consistent with chyle leak.   - Peritoneal fluid 11/29 growing enterococcus and will plan for treatment with Vanc x 10 days per ID recs.   - Attempted low fat, high protein diet due to suspicion for chyle leak with no improvement seen in fluid character.  - CT abd/pelvis 12/3 shows small volume abdominal ascites, improved compared to prior, pelvis ascites unchanged from prior, unchanged b/l pleural effusions (large on R, moderate on L) with bibasilar compressive atelectasis and consolidative changes.  - Placed PICC line and started TPN + octreotide 12/3. Improvement in color of fluid seen 12/6 - 12/7 (less milky, more abbie). Will continue with TPN + adv diet to low fat at this time.  - Continue to monitor.  -lasix 40 mg iv x 1 12/8/18  - diamox 500mg iv x 1 12/9/18

## 2018-12-09 NOTE — ASSESSMENT & PLAN NOTE
- Valcyte for CMV prophylaxis--> holding (10/31) for leukopenia. Pt will need weekly CMV PCR while discontinued.  - CMV PCR noted to be positive at 129. .  - CMV PCR 11/8: 105  - CMV PCR 11/15 with 89 copies  - CMV PCR 11/23 95 - increased from previous. Continue to hold Valcyte & monitor pt closely.  - CMV PCR 11/29 146  - CMV PCR 12/6 529 - start treatment dose valcyte 12/9/18  - Bactrim for PCP prophylaxis ([facility]).--> holding (10/31) for leukopenia.   - Start Atovaquone 11/6.  - Fluconazole for fungal prophylaxis (first dose 10/6). D/c Fluc 11/16 bc pt had been on it for > 30 days post take back.

## 2018-12-09 NOTE — NURSING
Pt glucose after bolus is 156, spoke with SCOT Caldera, who ordered to administer the 6U with meal. Will cont to monitor.

## 2018-12-09 NOTE — PLAN OF CARE
Problem: Patient Care Overview  Goal: Plan of Care Review  Outcome: Ongoing (interventions implemented as appropriate)  Pt AAOx4, VSS, afebrile. CXR performed - same results as on 12/3. CRISTIANO performed - unremarkable except for mild regurgitation of the aortic valve. Diamox administered as ordered IV x1. Started pt on Valcyte today, 450mg BID. Elevated BGs today, endocrine aware, adjustments made to orders. Insulin drip currently @ 1.4. Will notify NP of pt's dinner time BG as requested. VALORIE drain put out 90cc thus far.  at bedside. Bed in low/locked position, call light/personal belongings within reach, non-slip socks on when OOB, will cont to monitor.

## 2018-12-10 PROBLEM — D62 ACUTE BLOOD LOSS ANEMIA: Status: RESOLVED | Noted: 2018-10-16 | Resolved: 2018-12-10

## 2018-12-10 LAB
ALBUMIN SERPL BCP-MCNC: 2.9 G/DL
ALP SERPL-CCNC: 209 U/L
ALT SERPL W/O P-5'-P-CCNC: 20 U/L
ANION GAP SERPL CALC-SCNC: 10 MMOL/L
AST SERPL-CCNC: 18 U/L
BASOPHILS # BLD AUTO: 0.03 K/UL
BASOPHILS NFR BLD: 0.9 %
BILIRUB SERPL-MCNC: 1.5 MG/DL
BUN SERPL-MCNC: 44 MG/DL
CALCIUM SERPL-MCNC: 8.6 MG/DL
CHLORIDE SERPL-SCNC: 100 MMOL/L
CO2 SERPL-SCNC: 24 MMOL/L
CREAT SERPL-MCNC: 1 MG/DL
DIFFERENTIAL METHOD: ABNORMAL
EOSINOPHIL # BLD AUTO: 0 K/UL
EOSINOPHIL NFR BLD: 1.3 %
ERYTHROCYTE [DISTWIDTH] IN BLOOD BY AUTOMATED COUNT: 15.9 %
EST. GFR  (AFRICAN AMERICAN): >60 ML/MIN/1.73 M^2
EST. GFR  (NON AFRICAN AMERICAN): 57.6 ML/MIN/1.73 M^2
GLUCOSE SERPL-MCNC: 447 MG/DL
HCT VFR BLD AUTO: 27.5 %
HGB BLD-MCNC: 8.6 G/DL
IMM GRANULOCYTES # BLD AUTO: 0.15 K/UL
IMM GRANULOCYTES NFR BLD AUTO: 4.7 %
LYMPHOCYTES # BLD AUTO: 0.5 K/UL
LYMPHOCYTES NFR BLD: 15.5 %
MAGNESIUM SERPL-MCNC: 2 MG/DL
MCH RBC QN AUTO: 29 PG
MCHC RBC AUTO-ENTMCNC: 31.3 G/DL
MCV RBC AUTO: 93 FL
MONOCYTES # BLD AUTO: 0.5 K/UL
MONOCYTES NFR BLD: 14.8 %
NEUTROPHILS # BLD AUTO: 2 K/UL
NEUTROPHILS NFR BLD: 62.8 %
NRBC BLD-RTO: 0 /100 WBC
PHOSPHATE SERPL-MCNC: 3.2 MG/DL
PLATELET # BLD AUTO: 108 K/UL
PMV BLD AUTO: 10.5 FL
POCT GLUCOSE: 248 MG/DL (ref 70–110)
POCT GLUCOSE: 262 MG/DL (ref 70–110)
POCT GLUCOSE: 269 MG/DL (ref 70–110)
POCT GLUCOSE: 342 MG/DL (ref 70–110)
POCT GLUCOSE: 342 MG/DL (ref 70–110)
POCT GLUCOSE: 356 MG/DL (ref 70–110)
POCT GLUCOSE: 357 MG/DL (ref 70–110)
POCT GLUCOSE: 413 MG/DL (ref 70–110)
POCT GLUCOSE: 441 MG/DL (ref 70–110)
POCT GLUCOSE: 462 MG/DL (ref 70–110)
POTASSIUM SERPL-SCNC: 4.5 MMOL/L
PREALB SERPL-MCNC: 13 MG/DL
PROT SERPL-MCNC: 5.2 G/DL
RBC # BLD AUTO: 2.97 M/UL
SODIUM SERPL-SCNC: 134 MMOL/L
TACROLIMUS BLD-MCNC: 4.8 NG/ML
WBC # BLD AUTO: 3.17 K/UL

## 2018-12-10 PROCEDURE — 99232 SBSQ HOSP IP/OBS MODERATE 35: CPT | Mod: ,,, | Performed by: NURSE PRACTITIONER

## 2018-12-10 PROCEDURE — 25000003 PHARM REV CODE 250: Performed by: PHYSICIAN ASSISTANT

## 2018-12-10 PROCEDURE — 27000646 HC AEROBIKA DEVICE

## 2018-12-10 PROCEDURE — 20600001 HC STEP DOWN PRIVATE ROOM

## 2018-12-10 PROCEDURE — 80053 COMPREHEN METABOLIC PANEL: CPT

## 2018-12-10 PROCEDURE — 99900035 HC TECH TIME PER 15 MIN (STAT)

## 2018-12-10 PROCEDURE — 25000003 PHARM REV CODE 250: Performed by: TRANSPLANT SURGERY

## 2018-12-10 PROCEDURE — 63600175 PHARM REV CODE 636 W HCPCS: Performed by: NURSE PRACTITIONER

## 2018-12-10 PROCEDURE — 25000003 PHARM REV CODE 250: Performed by: NURSE PRACTITIONER

## 2018-12-10 PROCEDURE — 85025 COMPLETE CBC W/AUTO DIFF WBC: CPT

## 2018-12-10 PROCEDURE — 80197 ASSAY OF TACROLIMUS: CPT

## 2018-12-10 PROCEDURE — A4217 STERILE WATER/SALINE, 500 ML: HCPCS | Performed by: NURSE PRACTITIONER

## 2018-12-10 PROCEDURE — 84100 ASSAY OF PHOSPHORUS: CPT

## 2018-12-10 PROCEDURE — A4216 STERILE WATER/SALINE, 10 ML: HCPCS | Performed by: TRANSPLANT SURGERY

## 2018-12-10 PROCEDURE — 63600175 PHARM REV CODE 636 W HCPCS: Performed by: PHYSICIAN ASSISTANT

## 2018-12-10 PROCEDURE — 94664 DEMO&/EVAL PT USE INHALER: CPT

## 2018-12-10 PROCEDURE — 83735 ASSAY OF MAGNESIUM: CPT

## 2018-12-10 PROCEDURE — 84134 ASSAY OF PREALBUMIN: CPT

## 2018-12-10 RX ORDER — INSULIN ASPART 100 [IU]/ML
4 INJECTION, SOLUTION INTRAVENOUS; SUBCUTANEOUS
Status: DISCONTINUED | OUTPATIENT
Start: 2018-12-10 | End: 2018-12-10

## 2018-12-10 RX ADMIN — INSULIN ASPART 4 UNITS: 100 INJECTION, SOLUTION INTRAVENOUS; SUBCUTANEOUS at 04:12

## 2018-12-10 RX ADMIN — DIBASIC SODIUM PHOSPHATE, MONOBASIC POTASSIUM PHOSPHATE AND MONOBASIC SODIUM PHOSPHATE 2 TABLET: 852; 155; 130 TABLET ORAL at 09:12

## 2018-12-10 RX ADMIN — PANTOPRAZOLE SODIUM 40 MG: 40 TABLET, DELAYED RELEASE ORAL at 09:12

## 2018-12-10 RX ADMIN — METOPROLOL TARTRATE 25 MG: 25 TABLET ORAL at 09:12

## 2018-12-10 RX ADMIN — ATOVAQUONE 1500 MG: 750 SUSPENSION ORAL at 09:12

## 2018-12-10 RX ADMIN — Medication 10 ML: at 12:12

## 2018-12-10 RX ADMIN — Medication 10 ML: at 06:12

## 2018-12-10 RX ADMIN — MAGNESIUM OXIDE TAB 400 MG (241.3 MG ELEMENTAL MG) 800 MG: 400 (241.3 MG) TAB at 09:12

## 2018-12-10 RX ADMIN — TACROLIMUS 1 MG: 1 CAPSULE ORAL at 09:12

## 2018-12-10 RX ADMIN — VALGANCICLOVIR 450 MG: 450 TABLET, FILM COATED ORAL at 09:12

## 2018-12-10 RX ADMIN — HEPARIN SODIUM 5000 UNITS: 5000 INJECTION, SOLUTION INTRAVENOUS; SUBCUTANEOUS at 09:12

## 2018-12-10 RX ADMIN — ENTECAVIR 0.5 MG: 0.5 TABLET ORAL at 09:12

## 2018-12-10 RX ADMIN — INSULIN ASPART 2 UNITS: 100 INJECTION, SOLUTION INTRAVENOUS; SUBCUTANEOUS at 12:12

## 2018-12-10 RX ADMIN — TACROLIMUS 1 MG: 1 CAPSULE ORAL at 05:12

## 2018-12-10 RX ADMIN — OCTREOTIDE ACETATE 50 MCG: 100 INJECTION, SOLUTION INTRAVENOUS; SUBCUTANEOUS at 09:12

## 2018-12-10 RX ADMIN — TRAZODONE HYDROCHLORIDE 25 MG: 50 TABLET ORAL at 09:12

## 2018-12-10 RX ADMIN — SODIUM CHLORIDE 2 UNITS/HR: 9 INJECTION, SOLUTION INTRAVENOUS at 04:12

## 2018-12-10 RX ADMIN — ASPIRIN 81 MG CHEWABLE TABLET 81 MG: 81 TABLET CHEWABLE at 09:12

## 2018-12-10 RX ADMIN — INSULIN HUMAN 4 UNITS: 100 INJECTION, SOLUTION PARENTERAL at 08:12

## 2018-12-10 RX ADMIN — OCTREOTIDE ACETATE 50 MCG: 100 INJECTION, SOLUTION INTRAVENOUS; SUBCUTANEOUS at 02:12

## 2018-12-10 RX ADMIN — CALCIUM GLUCONATE: 94 INJECTION, SOLUTION INTRAVENOUS at 09:12

## 2018-12-10 NOTE — SUBJECTIVE & OBJECTIVE
"Interval HPI:   Overnight events: Remains in TSU. BG continue to be variable and outside of desired goals. Insulin infusion rates are ranging from 1-2 units an hour. Diet has been reduced to NPO in preporation for upcoming procedure.  Eating:   NPO  Nausea: No  Hypoglycemia and intervention: No  Fever: No  TPN and/or TF: Yes  If yes, type of TF/TPN and rate: yes, 50 cc/hr    BP (!) 109/55 (BP Location: Left arm, Patient Position: Sitting)   Pulse 98   Temp 98.2 °F (36.8 °C) (Oral)   Resp 18   Ht 5' 3" (1.6 m)   Wt 55.1 kg (121 lb 7.6 oz)   LMP  (LMP Unknown)   SpO2 100%   Breastfeeding? No   BMI 21.52 kg/m²     Labs Reviewed and Include    Recent Labs   Lab 12/10/18  0420   *   CALCIUM 8.6*   ALBUMIN 2.9*   PROT 5.2*   *   K 4.5   CO2 24      BUN 44*   CREATININE 1.0   ALKPHOS 209*   ALT 20   AST 18   BILITOT 1.5*     Lab Results   Component Value Date    WBC 3.17 (L) 12/10/2018    HGB 8.6 (L) 12/10/2018    HCT 27.5 (L) 12/10/2018    MCV 93 12/10/2018     (L) 12/10/2018     No results for input(s): TSH, FREET4 in the last 168 hours.  Lab Results   Component Value Date    HGBA1C 6.0 (H) 10/04/2018       Nutritional status:   Body mass index is 21.52 kg/m².  Lab Results   Component Value Date    ALBUMIN 2.9 (L) 12/10/2018    ALBUMIN 2.9 (L) 12/09/2018    ALBUMIN 2.9 (L) 12/08/2018     Lab Results   Component Value Date    PREALBUMIN 13 (L) 12/10/2018    PREALBUMIN 15 (L) 11/03/2018    PREALBUMIN 12 (L) 09/17/2018       Estimated Creatinine Clearance: 43.9 mL/min (based on SCr of 1 mg/dL).    Accu-Checks  Recent Labs     12/09/18  1539 12/09/18  1744 12/09/18  2107 12/09/18  2133 12/10/18  0000 12/10/18  0438 12/10/18  0814 12/10/18  0858 12/10/18  1114 12/10/18  1359   POCTGLUCOSE 374* 156* 77 98 248* 413* 462* 441* 342* 356*       Current Medications and/or Treatments Impacting Glycemic Control  Immunotherapy:    Immunosuppressants         Stop Route Frequency     tacrolimus capsule " 1 mg      -- Oral 2 times daily        Steroids:   Hormones (From admission, onward)    Start     Stop Route Frequency Ordered    12/03/18 1400  octreotide injection 50 mcg      -- SubQ Every 8 hours 12/03/18 1116    10/17/18 0945  predniSONE tablet 15 mg      10/24 0859 Oral Daily 10/17/18 0943        Pressors:    Autonomic Drugs (From admission, onward)    Start     Stop Route Frequency Ordered    10/06/18 1617  rocuronium (ZEMURON) 10 mg/mL injection     Comments:  Created by cabinet override    10/07 0429   10/06/18 1617        Hyperglycemia/Diabetes Medications:   Antihyperglycemics (From admission, onward)    Start     Stop Route Frequency Ordered    12/10/18 1130  insulin aspart U-100 pen 4 Units      -- SubQ 3 times daily with meals 12/10/18 0832    12/09/18 1554  insulin regular injection 4 Units      -- IV Once 12/09/18 1554    12/05/18 1115  insulin regular (Humulin R) 100 Units in sodium chloride 0.9% 100 mL infusion      -- IV Continuous 12/05/18 1012    12/05/18 1111  insulin aspart U-100 pen 0-4 Units      -- SubQ As needed (PRN) 12/05/18 1012    10/07/18 1200  insulin regular (Humulin R) 100 Units in sodium chloride 0.9% 100 mL infusion      10/13 2100 IV Continuous 10/07/18 1055

## 2018-12-10 NOTE — ASSESSMENT & PLAN NOTE
Managed per primary team  Avoid hypoglycemia    Recent Labs   Lab 12/10/18  0420   ALT 20   AST 18   ALKPHOS 209*   BILITOT 1.5*   PROT 5.2*   ALBUMIN 2.9*

## 2018-12-10 NOTE — ASSESSMENT & PLAN NOTE
- Common post liver transplant  - CT 11/26 showed: Large right and moderate left pleural effusions with adjacent basilar atelectasis.  - Pt sp02 stable on RA, but c/o some orthopnea.  - CT abd/pelvis 12/3 showed unchanged b/l pleural effusions (large on R, moderate on L) with bibasilar compressive atelectasis and consolidative changes.  - Sp02 stable & no signs of respiratory infection  - repeat echo and chest xray with pleural effusion - plan for thoracentesis with chest tube placement, send

## 2018-12-10 NOTE — PLAN OF CARE
Problem: Patient Care Overview  Goal: Plan of Care Review  Outcome: Ongoing (interventions implemented as appropriate)  AAOx4. VS stable, afebrile. C/o SOB when lying in bed. Sats > 98% on RA. Reports feeling bad but denies wanting any pain medication. R UA PICC, CDI. No redness, no swelling. Dressing to be changed today. Wound vac to chevron with continuous suction @ 125. Wound care to possibly remove vac tomorrow. R grenade draining milky yellow fluid. TPN infusing @ 50. Bag and tubing to be changed tonight. Insulin gtt infusing @ 2u/hr. Restarted gtt this morning as am sugar was >400. 4 units insulin bolus given x2 through IV. Accuchecks changed to q2h. SSI given when needed. Latest CXR showing pam pleural effusions. NPO for planned thoracentesis with chest tube placement tomorrow. Up in chair.  @ bedside. In NAD. Will continue to monitor.

## 2018-12-10 NOTE — ASSESSMENT & PLAN NOTE
- obtain chest xray and echo  - diamox 500mg iv x 1 12/9/18  - lasix 40 mg iv x 1, thoracentesis with chest tube placement ordered

## 2018-12-10 NOTE — ASSESSMENT & PLAN NOTE
- Paracentesis 10/1, 5L removed, no fluid sent for analysis.  - Pt remains volume overloaded.   - Liver US 11/6 with severe ascites.   - Paracentesis 11/7 with 8.9 L removed. Wbc 39, segs 16%. Gram stain - no organisms seen and no WBC. Cultures NGTD.  - Paracentesis 11/12: 8.6 L removed. 34 wbc, 25% segs, 33 lymphs, 42 monocytes/macrophages. Cultures NGTD  - Paracentesis 11/21 with 4 L removed, neg for infection.  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 Milky appearance, cell count of fluid (51% lymphs), triglycerides 2208, consistent with chyle leak.   - Peritoneal fluid 11/29 growing enterococcus and will plan for treatment with Vanc x 10 days per ID recs.   - Attempted low fat, high protein diet due to suspicion for chyle leak with no improvement seen in fluid character.  - CT abd/pelvis 12/3 shows small volume abdominal ascites, improved compared to prior, pelvis ascites unchanged from prior, unchanged b/l pleural effusions (large on R, moderate on L) with bibasilar compressive atelectasis and consolidative changes.  - Placed PICC line and started TPN + octreotide 12/3. Improvement in color of fluid seen 12/6 - 12/7 (less milky, more abbie). Will continue with TPN + adv diet to low fat at this time.  - Continue to monitor.  -lasix 40 mg iv x 1 12/8/18  - diamox 500mg iv x 1 12/9/18  - lasix 40 mg iv x 1 12/10/18

## 2018-12-10 NOTE — PROGRESS NOTES
[facility]  Liver Transplant  Progress Note    Patient Name: Scarlett Reddy  MRN: [id number]  Admission Date: 10/1/2018  Hospital Length of Stay: 69 days  Code Status: Full Code  Primary Care Provider: Primary Doctor No  Post-Operative Day: 66    ORGAN:   LIVER  Disease Etiology: Cirrhosis: Type B and D  Donor Type:    - Brain Death  CDC High Risk:   No  Donor CMV Status:   Donor CMV Status: Positive  Donor HBcAB:   Negative  Donor HCV Status:   Negative  Donor HBV SETH: Negative  Donor HCV SETH: Negative  Whole or Partial: Whole Liver  Biliary Anastomosis: End to End  Arterial Anatomy: Replaced Left Hepatic and Right Hepatic  Subjective:     History of Present Illness:  Ms. Reddy is a 67 y/o female with PMH of ESLD secondary Hep B and D.  Listed for liver transplant with MELD 23.  Paracentesis 10/1 with 5L removed, no fluid sent for analysis.  Morning labs significant for hyponatremia, Na 119.  Pt admitted to LTS for sodium monitoring.        Hospital Course:  Hospital Course: 67 y/o F h/o HBV/delta cirrhosis s/p DBDLT 10/5/18 (CMV D+/R+, steroid induction, MMF/tacro/pred maintenance) with take back on 10/6 2/2 hyperbilirubinema and bilious VALORIE drainage, no leak identified. Post-op course c/b hypercapneic and hypoxic respiratory failure and was reintubated though quickly extubated now doing well. Liver bx (10/6) sig for Zone 3 hemorrhage and collapse, in addition to cholestasis. Transferred from ICU on POD #4. ID consulted to comment on positive diphtheroid culture from ascitic fluid (likely skin colonization) as well as ESBL Klebsiella pneumoniae in urine culture (10/4).  Pt asymptomatic and cell count from ascitic fluid unremarkable. Per ID recs, no need to treat diphtheroids or asymptomatic ESBL Kleb pneumo bacteriuria though pt has had 6 days of therapy which would cover these organisms. Mild peritransplant ascites- repeat US 10/6 with increased arterial resistive indices. Repeat US 10/9  w/ continued elevation in RIs and fluid collections. LFTs trending down nicely.     Acute gradual worsening of renal function s/p OLTx. Creatinine on arrival 0.8 and has been up trending. Nephrology consulted for post-op EVA. Cr cont to trend up but remains to have good UOP. Had stable response to lasiz diuresis.  BUN elevated requiring multiple rounds of dialysis (10/15, 10/17, 10/20, 10/31). No significant improvements in kidney function noted.   In addition, pt H/H cont to fluctuate requiring multiple blood transfusions (10/14, 10/16, 10/18, 10/19, 10/22, 10/27, 10/29).  Pt reported dark stools 10/15 but color has normalized. FOBT+. EGD 10/18 consistent with ulcerated mucosa in duodenum s/p coagulation. Concern for H. Pylori. Started Amoxicillin/levofloxacin (10/19, complete 11/2)). Remains on Protonix 40 mg BID. Biopsy negative for infection and CMV. Will cont close monitoring H/H, transfuse as needed with goal Hb > 7.0. Of note, Urine cx + Klebsiella pneumoniae (10/13). Received 3 day course of ciprofloxacin, dc'd 10/19.   On 10/19 pm pt c/o crushing chest pain. Troponin 0.094, likely 2/2 ischemic stress. EKG NSR.   Liver US 11/6 showed no arterial flow. Paracentesis 11/7 with 8.9 L removed. Wbc 39, segs 16%. Gram stain no organisms seen + no WBC. Pt feels better since having fluid removed but she continues to have worsening abdominal distention & LE edema.Repeat Liver US 11/8 showed arterial flow with increased RIs. Paracentesis 11/12 with 8.6L removed (WBC 34, segs 25%). 11/15 liver u/s showed abnormally decreased main hepatic artery peak systolic velocity with slow arterial flow and abnormal tardus parvus waveforms intrahepatically. Consulted vascular surgery. Repeat liver US 11/19 showed no arterial flow on R and sluggish flow on L. Hepatic angiogram 11/19 confirmed HAT.  Friday 11/16, purulent drainage noted from middle of chevron incision. Area opened and packed. Cx grew klebsiella p. CT scan showed no  signs of infection. On Monday 11/18, it was noted that the right/lateral side of the chevron had 3 small openings draining purulent fluid. Opened the area and packed. Vac placed on 11/24. Wound care following. CT abd/pelvis 11/3 showed small volume abdominal ascites, improved compared to prior, pelvis ascites unchanged from prior, unchanged b/l pleural effusions (large on R, moderate on L) with bibasilar compressive atelectasis and consolidative changes. Patient finished day 7 of 7 of Ertapenem for kleb pneumo ESBL UTI 11/29. Of note, pt grew same bacteria from wound 11/16. Liver US to assess recanalization post HAT 12/4 -> conclusion that pt may recanalize. Will monitor closely with no further intervention planned at this time.    Interval history: No acute events overnight. TPN + octreotide started 12/3. Peritoneal fluid (perc drain) continues to be yellow milky in nature.  Will continue w/ TPN + again make npo. Peritoneal fluid 11/29 w/ enterococcus. Per ID, treating with Vanc x 10 days and then repeat peritoneal fluid cx. Wound vac changed 12/7, w/ improvements noted.  Per wound care, will stop vac next dressing change because wound depth has improved to 0.5 cm.  Complaining of not sleeping well at night due to shortness of breath. Chest xray and echo with pleural effusions.  Plan for thoracentesis with chest tube placement.  Diurese with lasix 40 mg iv x 1 today.  Also add trazodone to aid insomnia.    Scheduled Meds:   aspirin  81 mg Oral Daily    atovaquone  1,500 mg Oral Daily    entecavir  0.5 mg Oral Daily    heparin (porcine)  5,000 Units Subcutaneous Q12H    insulin aspart U-100  4 Units Subcutaneous TIDWM    insulin regular  4 Units Intravenous Once    magnesium oxide  800 mg Oral BID    metoprolol tartrate  25 mg Oral BID    octreotide  50 mcg Subcutaneous Q8H    pantoprazole  40 mg Oral BID    sodium chloride 0.9%  10 mL Intravenous Q6H    tacrolimus  1 mg Oral BID    traZODone  25 mg  Oral QHS    valGANciclovir  450 mg Oral BID     Continuous Infusions:   insulin (HUMAN R) infusion (adults) 1.2 Units/hr (12/10/18 0858)    TPN ADULT CENTRAL LINE CUSTOM 50 mL/hr at 12/09/18 2105    TPN ADULT CENTRAL LINE CUSTOM       PRN Meds:sodium chloride, sodium chloride, sodium chloride, acetaminophen, bisacodyl, calcium carbonate, dextrose 50%, dextrose 50%, glucagon (human recombinant), glucose, glucose, insulin aspart U-100, omnipaque, ondansetron, ondansetron, polyethylene glycol, Flushing PICC Protocol **AND** sodium chloride 0.9% **AND** sodium chloride 0.9%, sodium chloride 0.9%, traMADol    Review of Systems   Constitutional: Positive for activity change and appetite change.   Respiratory: Positive for shortness of breath.    Cardiovascular: Positive for leg swelling. Negative for chest pain and palpitations.   Gastrointestinal: Positive for abdominal distention and abdominal pain.   Allergic/Immunologic: Positive for immunocompromised state.   Psychiatric/Behavioral: Positive for dysphoric mood and sleep disturbance.     Objective:     Vital Signs (Most Recent):  Temp: 98.2 °F (36.8 °C) (12/10/18 0908)  Pulse: 98 (12/10/18 0908)  Resp: 18 (12/10/18 0908)  BP: (!) 109/55 (12/10/18 0908)  SpO2: 100 % (12/10/18 0908) Vital Signs (24h Range):  Temp:  [97.9 °F (36.6 °C)-98.3 °F (36.8 °C)] 98.2 °F (36.8 °C)  Pulse:  [] 98  Resp:  [16-23] 18  SpO2:  [97 %-100 %] 100 %  BP: (107-133)/(55-89) 109/55     Weight: 55.1 kg (121 lb 7.6 oz)  Body mass index is 21.52 kg/m².    Intake/Output - Last 3 Shifts       12/08 0700 - 12/09 0659 12/09 0700 - 12/10 0659 12/10 0700 - 12/11 0659    P.O. 360 255 355    I.V. (mL/kg) 41.9 (0.8) 35 (0.6)     IV Piggyback 250       1300 200    Total Intake(mL/kg) 1616.9 (29.1) 1590 (28.9) 555 (10.1)    Urine (mL/kg/hr) 0 (0) 0 (0)     Drains 325 115     Stool       Total Output 325 115     Net +1291.9 +1475 +555           Urine Occurrence 3 x 2 x 1 x    Stool  Occurrence 0 x 0 x 0 x          Physical Exam   Constitutional: She is oriented to person, place, and time.   Hand and temporal muscle wasting   HENT:   Head: Normocephalic.   Eyes: Scleral icterus is present.   Neck: Normal range of motion.   Cardiovascular: Normal rate, regular rhythm, normal heart sounds and intact distal pulses.   Pulmonary/Chest: Effort normal. She has decreased breath sounds in the right lower field and the left lower field.   Abdominal: Soft. She exhibits ascites.       Musculoskeletal: Normal range of motion. She exhibits edema.   Neurological: She is alert and oriented to person, place, and time.   Skin: Skin is warm and dry.   Psychiatric: She has a normal mood and affect. Her behavior is normal.   Nursing note and vitals reviewed.      Laboratory:  Immunosuppressants         Stop Route Frequency     tacrolimus capsule 1 mg      -- Oral 2 times daily        CBC:   Recent Labs   Lab 12/10/18  0420   WBC 3.17*   RBC 2.97*   HGB 8.6*   HCT 27.5*   *   MCV 93   MCH 29.0   MCHC 31.3*     CMP:   Recent Labs   Lab 12/10/18  0420   *   CALCIUM 8.6*   ALBUMIN 2.9*   PROT 5.2*   *   K 4.5   CO2 24      BUN 44*   CREATININE 1.0   ALKPHOS 209*   ALT 20   AST 18   BILITOT 1.5*     Coagulation: No results for input(s): PT, INR, APTT in the last 168 hours.  Labs within the past 24 hours have been reviewed.    Diagnostic Results:  None    Assessment/Plan:     * Liver transplanted    - PMHx of ESLD secondary to hep B cirrhosis, now s/p liver transplant on 10/5, with takeback for hyperbilirubinemia and bilious VALORIE output 10/6; no biliary leak identified.   - POD 1 US: increased arterial resistive indices, suggestive of edema vs acute rejection vs congestion.  - Repeat US 10/9 with continued elevation of RIs.  - LFTs stable.  - T bili trending down.   - Repeat liver US (10/11): elevated RIs.  - Liver US 11/6 to assess for ascites. No arterial flow within the liver allograft  "concerning for interval thrombosis/occlusion of the arterial system. Severe ascites seen.  - Liver US 11/8: arterial flow seen w/ elevated RIs   - LFTs remain stable. No CTA at this time due to EVA and normal liver function.   - Liver US 11/15 showed "abnormally decreased main hepatic artery peak systolic velocity with slow arterial flow and abnormal tardus parvus waveforms intrahepatically."   -Vascular surgery consulted  - Liver US 11/19 showed no arterial flow on the right and sluggish flow on the left.   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 No further intervention planned at this time as patient's LFTs are stable. Will monitor.  - CT abd/pelvis 11/26 showed: 1. Persistent large volume of ascites. 2. Large right and moderate left pleural effusions with adjacent basilar atelectasis. 3. Anasarca  - IR placed perc drain 11/29 due to suspicion for chyle leak. 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  - Fluid also growing enterococcus and will plan for treatment with Vanc x 10 days per ID recs. Will need repeat fluid cx post treatment.       Other ascites    - Paracentesis 10/1, 5L removed, no fluid sent for analysis.  - Pt remains volume overloaded.   - Liver US 11/6 with severe ascites.   - Paracentesis 11/7 with 8.9 L removed. Wbc 39, segs 16%. Gram stain - no organisms seen and no WBC. Cultures NGTD.  - Paracentesis 11/12: 8.6 L removed. 34 wbc, 25% segs, 33 lymphs, 42 monocytes/macrophages. Cultures NGTD  - Paracentesis 11/21 with 4 L removed, neg for infection.  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 Milky appearance, cell count of fluid (51% lymphs), triglycerides 2208, consistent with chyle leak.   - Peritoneal fluid 11/29 growing enterococcus and will plan for treatment with Vanc x 10 days per ID recs.   - Attempted low fat, high protein diet due to suspicion for chyle leak with no improvement seen in " fluid character.  - CT abd/pelvis 12/3 shows small volume abdominal ascites, improved compared to prior, pelvis ascites unchanged from prior, unchanged b/l pleural effusions (large on R, moderate on L) with bibasilar compressive atelectasis and consolidative changes.  - Placed PICC line and started TPN + octreotide 12/3. Improvement in color of fluid seen 12/6 - 12/7 (less milky, more abbie). Will continue with TPN + adv diet to low fat at this time.  - Continue to monitor.  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  - Tx w/ Entecavir- dose changed to daily 11/26 due to improved renal fx  - 12/3 Hep B surface antigen negative 12/3, surface AB quant 571, and Hep B DNA <10.  - Continue to monitor, per protocol.     Type 2 diabetes mellitus with diabetic polyneuropathy, with long-term current use of insulin    - Continue home regimen.  - TPN started 12/3 and pt now on insulin drip.  - Endocrine following. Appreciate recs.       Sleeps in sitting position due to orthopnea    - obtain chest xray and echo  - diamox 500mg iv x 1 12/9/18  - lasix 40 mg iv x 1, thoracentesis with chest tube placement ordered       Hypoalbuminemia    - Continue to monitor.     Sequelae of hyperalimentation    - Monitor.       Pleural effusion    - Common post liver transplant  - CT 11/26 showed: Large right and moderate left pleural effusions with adjacent basilar atelectasis.  - Pt sp02 stable on RA, but c/o some orthopnea.  - CT abd/pelvis 12/3 showed unchanged b/l pleural effusions (large on R, moderate on L) with bibasilar compressive atelectasis and consolidative changes.  - Sp02 stable & no signs of respiratory infection  - repeat echo and chest xray with pleural effusion - plan for thoracentesis with chest tube placement, send      Hypomagnesemia    - Continue PO Mag Oxide  -  "Continue to monitor.       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       Delayed surgical wound healing    - Middle chevron incision opened 11/16 with purulent drainage  - Packed with Aquacel ag at this time. Growing Klebsiella p.   - CT abd/pelvis 11/17- no signs of infection  - 11/17: R side of chevron incision w/ 3 small holes draining purulent fluid, opened and packed w/ aquacel.   - Wound care following.  - Wound w/ increased drainage, so wound care placed vac 11/24/18.  Apprec recs. Extending Ertapenem thru 11/28 per ID.    - Abdomen erythematous and indurated RLQ inferior to chevron.  - CT abd/pelvis 11/26 - no sign of infection  - Last wound vac change 12/7 with improvements noted in wound - depth now 0.5cm, so vac to be d/c'd at next dressing change, per wound care.    .       Hepatic artery thrombosis of transplanted liver    - Liver US 11/19 showed no arterial flow to R and sluggish flow to L  - Hepatic angiogram 11/19 confirmed HAT  - Liver US 12/4 to evaluate for recanalization reviewed. Recanalization possible. Will continue to monitor.  - No further intervention planned at this time. Will continue to monitor.     Other drug-induced pancytopenia    - Decreased plt, wbc, h/h  - See "leukopenia"  - Continue to monitor.     Thrombocytopenia, unspecified    - Monitor.       Leukopenia    - Stopped Cellcept, Bactrim, and Valcyte 10/31   - Cellcept half dose resumed 11/6  - Started Atovaquone 11/6.   - CMV PCR noted to be positive at 129.Helld off on treatment with valcyte, however weekly CMV PCRs increasing, start treatment dose 12/9/18  - Cellcept held 11/23 2/2 infection (UTI + wound)  - Continue to monitor.       At risk for opportunistic infections    - Valcyte for CMV prophylaxis--> holding (10/31) for leukopenia. Pt will need weekly CMV PCR while discontinued.  - CMV PCR noted to be positive at " 129. .  - CMV PCR 11/8: 105  - CMV PCR 11/15 with 89 copies  - CMV PCR 11/23 95 - increased from previous. Continue to hold Valcyte & monitor pt closely.  - CMV PCR 11/29 146  - CMV PCR 12/6 529 - start treatment dose valcyte 12/9/18  - Bactrim for PCP prophylaxis (MWF).--> holding (10/31) for leukopenia.   - Start Atovaquone 11/6.  - Fluconazole for fungal prophylaxis (first dose 10/6). D/c Fluc 11/16 bc pt had been on it for > 30 days post take back.       Long-term use of immunosuppressant medication    - Prograf: stopped 10/29. Resumed 11/2.  --> Will monitor for signs of toxic side effects, check daily troughs, and change meds accordingly.   - Prednisone: stopped 10/29. Started on Hydrocortisone. pred taper resumed.  - Cellcept: stopped 10/31 for leukopenia. Restarted Cellcept 500 mg bid, 11/6.  - Cellcept held 11/23 2/2 infection         Prophylactic immunotherapy    - See long term use immunosuppresion.       Physical deconditioning    - PT/OT consulted.  Progressing well w/ therapy.  - Recommend home health PT and rolling walker at discharge (orders placed 11/6).       Anemia requiring transfusions    - Fluctuating H&H.    - Transfusions: 10/14; 10/16; 10/18; 10/19; 10/22; 10/27; 10/29, 10/31, 11/3, 11/21, 11/29, 12/4  - FOBT +  - LDH elevated, Hapto < 10.  - EGD 10/18: ulcerated duodenal mucosa.   - Consulted GI about this ongoing issue and further need of another EGD.  - EGD 11/5 unremarkable.   - Iron studies 11/13:  95, TIBC 107, sat iron 89, transferrin 72, ferritin pending  - Hemolytic anemia workup 11/13: Haptoglobin 132, retic 3.9   - H/H dropped 12/4 - transfused 2u PRBC with good response  - No overt signs of bleeding.  - Continue to monitor.           Severe malnutrition    - Dietary consulted.   - Temporal and distal extremity muscle wasting and edema.  - Encourage supplements and to increase nutritional intake.  - Per nephrology, pt to be on low protein diet.  - Prealbumin reviewed.   -  Tolerating diet.  Denies n/v.    - Attempted low fat, high protein diet due to suspicion for chyle leak with no improvement seen in fluid character.  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 Had advanced diet with return of milky drainage, will make npo & continue tpn for now.    - Monitor.             VTE Risk Mitigation (From admission, onward)        Ordered     heparin (porcine) injection 5,000 Units  Every 12 hours      12/06/18 1058     IP VTE HIGH RISK PATIENT  Once      11/05/18 1145     Place sequential compression device  Until discontinued      10/05/18 0709          The patients clinical status was discussed at multidisplinary rounds, involving transplant surgery, transplant medicine, pharmacy, nursing, nutrition, and social work    Discharge Planning: not candidate at this time  No Patient Care Coordination Note on file.      Seble Tineo, NP  Liver Transplant  [facility]

## 2018-12-10 NOTE — ASSESSMENT & PLAN NOTE
- Dietary consulted.   - Temporal and distal extremity muscle wasting and edema.  - Encourage supplements and to increase nutritional intake.  - Per nephrology, pt to be on low protein diet.  - Prealbumin reviewed.   - Tolerating diet.  Denies n/v.    - Attempted low fat, high protein diet due to suspicion for chyle leak with no improvement seen in fluid character.  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 Had advanced diet with return of milky drainage, will make npo & continue tpn for now.    - Monitor.

## 2018-12-10 NOTE — PT/OT/SLP PROGRESS
Physical Therapy      Patient Name:  Scarlett Reddy   MRN:  [id number]    Discussed care with RN in PM. RN reports pt has been experiencing decline today, decreased energy level, increased fatigue and weakness. Pt has been sitting up in chair all day. Plan to hold pt today and follow up at next possible date.     Aviva Barros, PT   12/10/2018

## 2018-12-10 NOTE — NURSING
BG= 77 (patient is on an insulin drip with BG monitoring q4h). Insulin drip turned off for this reading. New bag of TPN hanging. Per orders, will recheck BG in 30 minutes.    Results for MERCEDES PATHAK (MRN [id number])   Ref. Range 12/9/2018 21:07   POCT Glucose Latest Ref Range: 70 - 110 mg/dL 77       Addendum 2135: After 30 minutes, BG= 98. Per insulin infusion orders, now that the drip has been titrated off for a reading below 100, it will not be restarted tonight. Will continue to monitor BG q4h and administer Novolog coverage for any hyperglycemia.    Results for MERCEDES PATHAK (MRN [id number])   Ref. Range 12/9/2018 21:33   POCT Glucose Latest Ref Range: 70 - 110 mg/dL 98     Addendum 0440: Blood sugar has now soared back up to >400 with insulin drip off (TPN infusing). Administered 4 Units of Novolog per sliding scale.    Results for MERCEDES PATHAK (MRN [id number])   Ref. Range 12/10/2018 04:38   POCT Glucose Latest Ref Range: 70 - 110 mg/dL 413 (H)

## 2018-12-10 NOTE — SUBJECTIVE & OBJECTIVE
Scheduled Meds:   aspirin  81 mg Oral Daily    atovaquone  1,500 mg Oral Daily    entecavir  0.5 mg Oral Daily    heparin (porcine)  5,000 Units Subcutaneous Q12H    insulin aspart U-100  4 Units Subcutaneous TIDWM    insulin regular  4 Units Intravenous Once    magnesium oxide  800 mg Oral BID    metoprolol tartrate  25 mg Oral BID    octreotide  50 mcg Subcutaneous Q8H    pantoprazole  40 mg Oral BID    sodium chloride 0.9%  10 mL Intravenous Q6H    tacrolimus  1 mg Oral BID    traZODone  25 mg Oral QHS    valGANciclovir  450 mg Oral BID     Continuous Infusions:   insulin (HUMAN R) infusion (adults) 1.2 Units/hr (12/10/18 0858)    TPN ADULT CENTRAL LINE CUSTOM 50 mL/hr at 12/09/18 2105    TPN ADULT CENTRAL LINE CUSTOM       PRN Meds:sodium chloride, sodium chloride, sodium chloride, acetaminophen, bisacodyl, calcium carbonate, dextrose 50%, dextrose 50%, glucagon (human recombinant), glucose, glucose, insulin aspart U-100, omnipaque, ondansetron, ondansetron, polyethylene glycol, Flushing PICC Protocol **AND** sodium chloride 0.9% **AND** sodium chloride 0.9%, sodium chloride 0.9%, traMADol    Review of Systems   Constitutional: Positive for activity change and appetite change.   Respiratory: Positive for shortness of breath.    Cardiovascular: Positive for leg swelling. Negative for chest pain and palpitations.   Gastrointestinal: Positive for abdominal distention and abdominal pain.   Allergic/Immunologic: Positive for immunocompromised state.   Psychiatric/Behavioral: Positive for dysphoric mood and sleep disturbance.     Objective:     Vital Signs (Most Recent):  Temp: 98.2 °F (36.8 °C) (12/10/18 0908)  Pulse: 98 (12/10/18 0908)  Resp: 18 (12/10/18 0908)  BP: (!) 109/55 (12/10/18 0908)  SpO2: 100 % (12/10/18 0908) Vital Signs (24h Range):  Temp:  [97.9 °F (36.6 °C)-98.3 °F (36.8 °C)] 98.2 °F (36.8 °C)  Pulse:  [] 98  Resp:  [16-23] 18  SpO2:  [97 %-100 %] 100 %  BP: (107-133)/(55-89)  109/55     Weight: 55.1 kg (121 lb 7.6 oz)  Body mass index is 21.52 kg/m².    Intake/Output - Last 3 Shifts       12/08 0700 - 12/09 0659 12/09 0700 - 12/10 0659 12/10 0700 - 12/11 0659    P.O. 360 255 355    I.V. (mL/kg) 41.9 (0.8) 35 (0.6)     IV Piggyback 250       1300 200    Total Intake(mL/kg) 1616.9 (29.1) 1590 (28.9) 555 (10.1)    Urine (mL/kg/hr) 0 (0) 0 (0)     Drains 325 115     Stool       Total Output 325 115     Net +1291.9 +1475 +555           Urine Occurrence 3 x 2 x 1 x    Stool Occurrence 0 x 0 x 0 x          Physical Exam   Constitutional: She is oriented to person, place, and time.   Hand and temporal muscle wasting   HENT:   Head: Normocephalic.   Eyes: Scleral icterus is present.   Neck: Normal range of motion.   Cardiovascular: Normal rate, regular rhythm, normal heart sounds and intact distal pulses.   Pulmonary/Chest: Effort normal. She has decreased breath sounds in the right lower field and the left lower field.   Abdominal: Soft. She exhibits ascites.       Musculoskeletal: Normal range of motion. She exhibits edema.   Neurological: She is alert and oriented to person, place, and time.   Skin: Skin is warm and dry.   Psychiatric: She has a normal mood and affect. Her behavior is normal.   Nursing note and vitals reviewed.      Laboratory:  Immunosuppressants         Stop Route Frequency     tacrolimus capsule 1 mg      -- Oral 2 times daily        CBC:   Recent Labs   Lab 12/10/18  0420   WBC 3.17*   RBC 2.97*   HGB 8.6*   HCT 27.5*   *   MCV 93   MCH 29.0   MCHC 31.3*     CMP:   Recent Labs   Lab 12/10/18  0420   *   CALCIUM 8.6*   ALBUMIN 2.9*   PROT 5.2*   *   K 4.5   CO2 24      BUN 44*   CREATININE 1.0   ALKPHOS 209*   ALT 20   AST 18   BILITOT 1.5*     Coagulation: No results for input(s): PT, INR, APTT in the last 168 hours.  Labs within the past 24 hours have been reviewed.    Diagnostic Results:  None

## 2018-12-10 NOTE — ASSESSMENT & PLAN NOTE
BG goal 140-180    rewrite transition drip at rate of 1.5 with Moderate SQ Correction scale.  POC glucose q4 hr given finger sticks - coordinate with meals during the day  IV Bolus given in AM for Hyperglycemic correction. Start novolog 4 units with meals.     Discharge planning:  TBD

## 2018-12-10 NOTE — H&P
Inpatient Radiology Pre-procedure Note    History of Present Illness:  Scarlett Reddy is a 68 y.o. female who presents for pleural drain placement.  Admission H&P reviewed.  Past Medical History:   Diagnosis Date    Anemia requiring transfusions 8/16/2018    Ascites     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 8/15/2018    Cirrhosis     Cough 8/30/2018    Esophageal varices     Esophageal varices without bleeding 8/15/2018    Hepatic encephalopathy     Hepatitis B     Hepatitis D virus infection     Hypersplenism     Hyponatremia     Hypotension     Liver transplant candidate     Neutropenia     Portal hypertension     Severe malnutrition 8/16/2018    Type 2 diabetes mellitus, with long-term current use of insulin 8/15/2018     Past Surgical History:   Procedure Laterality Date    ANGIOGRAM-HEPATIC Left 11/19/2018    Performed by ORAL Edgar III, MD at [facility] OR [address] [place] [address]    ANGIOGRAPHY OF LIVER Left 11/19/2018    Procedure: ANGIOGRAM-HEPATIC;  Surgeon: ORAL Edgar III, MD;  Location: [facility] OR [address] [place] [address];  Service: Peripheral Vascular;  Laterality: Left;  13.7 min  436.10 mGy  24ml Dye  3ml Local    EGD (ESOPHAGOGASTRODUODENOSCOPY) N/A 11/5/2018    Performed by Johann Mejia MD at [facility] (2ND FLR)    EGD (ESOPHAGOGASTRODUODENOSCOPY) N/A 10/18/2018    Performed by Chung Robins MD at [facility] (2ND FLR)    EGD (ESOPHAGOGASTRODUODENOSCOPY) N/A 8/17/2018    Performed by Travon Delgadillo MD at [facility] (2ND FLR)    ESOPHAGOGASTRODUODENOSCOPY N/A 8/17/2018    Procedure: EGD (ESOPHAGOGASTRODUODENOSCOPY);  Surgeon: Travon Delgadillo MD;  Location: [address]);  Service: Endoscopy;  Laterality: N/A;    ESOPHAGOGASTRODUODENOSCOPY N/A 10/18/2018    Procedure: EGD (ESOPHAGOGASTRODUODENOSCOPY);  Surgeon: Chung Robins MD;  [address]  Service: Endoscopy;  Laterality: N/A;    ESOPHAGOGASTRODUODENOSCOPY N/A 11/5/2018    Procedure: EGD (ESOPHAGOGASTRODUODENOSCOPY);  Surgeon:  Johann Mejia MD;  Location: [facility] (2ND FLR);  Service: Endoscopy;  Laterality: N/A;    EXPLORATORY LAPAROTOMY AFTER LIVER TRANSPLANTATION N/A 10/6/2018    Procedure: LAPAROTOMY, EXPLORATORY, AFTER LIVER TRANSPLANT;  Surgeon: Benson Matson MD;  [facility];  Service: Transplant;  Laterality: N/A;    LAPAROTOMY, EXPLORATORY, AFTER LIVER TRANSPLANT N/A 10/6/2018    [facility] Benson [facility]    LIVER TRANSPLANT N/A 10/4/2018    Procedure: TRANSPLANT, LIVER;  Surgeon: Yony Burns MD;  [facility];  Service: Transplant;  Laterality: N/A;    TRANSPLANT, LIVER N/A 10/4/2018    Performed by Yony Burns MD at [facility] OR [address] [place] [address]       Review of Systems:   As documented in primary team H&P    Home Meds:   Prior to Admission medications    Medication Sig Start Date End Date Taking? Authorizing Provider   insulin aspart U-100 (NOVOLOG FLEXPEN U-100 INSULIN) 100 unit/mL InPn pen Inject 20 units into the skin with breakfast,17 units with lunch, and 17 with dinner Plus correction scale, max TDD 60 units daily 9/27/18  Yes Seble Tineo NP   insulin glargine (LANTUS) 100 unit/mL injection Inject 20 Units into the skin every evening. 9/27/18  Yes Seble Tineo NP   midodrine (PROAMATINE) 5 MG Tab Take 3 tablets (15 mg total) by mouth every 8 (eight) hours. 8/24/18 2/20/19 Yes Gigi Sprague MD   OMEGA-3 FATTY ACIDS-EPA ORAL Take by mouth.   Yes Historical Provider, MD   rifAXIMin (XIFAXAN) 550 mg Tab Take 1 tablet (550 mg total) by mouth 2 (two) times daily. 8/27/18  Yes Rita Rubin NP   sodium bicarbonate 650 MG tablet Take 2 tablets (1,300 mg total) by mouth 3 (three) times daily. 9/21/18  Yes Gigi Sprague MD   blood sugar diagnostic (ACCU-CHEK DC) Strp 1 strip by Misc.(Non-Drug; Combo Route) route 3 (three) times daily.    Historical Provider, MD   entecavir (BARACLUDE) 0.5 MG Tab Take 1 tablet (0.5 mg total) by mouth once daily.  "10/5/18   Benson Matson MD   ergocalciferol (VITAMIN D2) 50,000 unit Cap Take 1 capsule (50,000 Units total) by mouth every 7 days. 10/5/18   Benson Matson MD   famotidine (PEPCID) 20 MG tablet Take 1 tablet (20 mg total) by mouth once daily. 10/5/18   Benson Matson MD   mycophenolate (CELLCEPT) 250 mg Cap Take 4 capsules (1,000 mg total) by mouth 2 (two) times daily. 10/5/18   Benson Matson MD   pen needle, diabetic (BD ULTRA-FINE CITLALI PEN NEEDLE) 32 gauge x 5/32" Ndle To use to inject insulin 4x daily 9/14/18   Rita Rubin NP   predniSONE (DELTASONE) 10 MG tablet Take by mouth daily: 20mg 10/10-10/16, 15mg 10/17-10/23, 10mg 10/24-10/30, 5mg 10/31-11/6, 2.5mg 11/7-11/13, Stop 11/14 10/5/18   Benson Matson MD   sulfamethoxazole-trimethoprim 400-80mg (BACTRIM,SEPTRA) 400-80 mg per tablet Take 1 tablet by mouth once daily. STOP 4/3/19 10/5/18 4/3/19  Benson Matson MD   tacrolimus (PROGRAF) 1 MG Cap Take 6 capsules (6 mg total) by mouth every 12 (twelve) hours. 10/5/18   Benson Matson MD   valGANciclovir (VALCYTE) 450 mg Tab Take 1 tablet (450 mg total) by mouth once daily. STOP 1/3/19 10/5/18 1/3/19  Benson Matson MD     Scheduled Meds:    aspirin  81 mg Oral Daily    atovaquone  1,500 mg Oral Daily    entecavir  0.5 mg Oral Daily    heparin (porcine)  5,000 Units Subcutaneous Q12H    insulin aspart U-100  4 Units Subcutaneous TIDWM    insulin regular  4 Units Intravenous Once    magnesium oxide  800 mg Oral BID    metoprolol tartrate  25 mg Oral BID    octreotide  50 mcg Subcutaneous Q8H    pantoprazole  40 mg Oral BID    sodium chloride 0.9%  10 mL Intravenous Q6H    tacrolimus  1 mg Oral BID    traZODone  25 mg Oral QHS    valGANciclovir  450 mg Oral BID     Continuous Infusions:    insulin (HUMAN R) infusion (adults) 1.5 Units/hr (12/10/18 1422)    TPN ADULT CENTRAL LINE CUSTOM 50 mL/hr at 12/09/18 7814    TPN ADULT " CENTRAL LINE CUSTOM       PRN Meds:sodium chloride, sodium chloride, sodium chloride, acetaminophen, bisacodyl, calcium carbonate, dextrose 50%, dextrose 50%, glucagon (human recombinant), glucose, glucose, insulin aspart U-100, omnipaque, ondansetron, ondansetron, polyethylene glycol, Flushing PICC Protocol **AND** sodium chloride 0.9% **AND** sodium chloride 0.9%, sodium chloride 0.9%, traMADol  Anticoagulants/Antiplatelets: Heparin    Allergies: Review of patient's allergies indicates:  No Known Allergies  Sedation Hx: have not been any systemic reactions    Labs:  No results for input(s): INR in the last 168 hours.    Invalid input(s):  PT,  PTT    Recent Labs   Lab 12/10/18  0420   WBC 3.17*   HGB 8.6*   HCT 27.5*   MCV 93   *      Recent Labs   Lab 12/10/18  0420   *   *   K 4.5      CO2 24   BUN 44*   CREATININE 1.0   CALCIUM 8.6*   MG 2.0   ALT 20   AST 18   ALBUMIN 2.9*   BILITOT 1.5*         Vitals:  Temp: 98.2 °F (36.8 °C) (12/10/18 0908)  Pulse: 98 (12/10/18 0908)  Resp: 18 (12/10/18 0908)  BP: (!) 109/55 (12/10/18 0908)  SpO2: 100 % (12/10/18 0908)     Physical Exam:  ASA: III  Mallampati: III    General: no acute distress  Mental Status: alert and oriented to person, place and time  HEENT: normocephalic, atraumatic  Chest: unlabored breathing  Heart: regular heart rate  Abdomen: nondistended  Extremity: moves all extremities    Plan: Right pleural catheter placement  Sedation Plan: Up to moderate    Paulo Shaikh M.D.   PGY-2  Radiology

## 2018-12-11 PROBLEM — R23.9 ALTERATION IN SKIN INTEGRITY: Status: ACTIVE | Noted: 2018-12-11

## 2018-12-11 LAB
ALBUMIN SERPL BCP-MCNC: 2.9 G/DL
ALP SERPL-CCNC: 185 U/L
ALT SERPL W/O P-5'-P-CCNC: 18 U/L
ANION GAP SERPL CALC-SCNC: 9 MMOL/L
APPEARANCE FLD: NORMAL
AST SERPL-CCNC: 20 U/L
BASOPHILS # BLD AUTO: 0.02 K/UL
BASOPHILS NFR BLD: 0.5 %
BILIRUB SERPL-MCNC: 1.4 MG/DL
BODY FLD TYPE: NORMAL
BUN SERPL-MCNC: 46 MG/DL
CALCIUM SERPL-MCNC: 8.9 MG/DL
CHLORIDE SERPL-SCNC: 104 MMOL/L
CO2 SERPL-SCNC: 24 MMOL/L
COLOR FLD: YELLOW
CREAT SERPL-MCNC: 1 MG/DL
DIFFERENTIAL METHOD: ABNORMAL
EOSINOPHIL # BLD AUTO: 0.1 K/UL
EOSINOPHIL NFR BLD: 1.9 %
ERYTHROCYTE [DISTWIDTH] IN BLOOD BY AUTOMATED COUNT: 15.9 %
EST. GFR  (AFRICAN AMERICAN): >60 ML/MIN/1.73 M^2
EST. GFR  (NON AFRICAN AMERICAN): 57.6 ML/MIN/1.73 M^2
FUNGUS SPEC CULT: NORMAL
GLUCOSE SERPL-MCNC: 173 MG/DL
HCT VFR BLD AUTO: 25.1 %
HGB BLD-MCNC: 7.7 G/DL
IMM GRANULOCYTES # BLD AUTO: 0.18 K/UL
IMM GRANULOCYTES NFR BLD AUTO: 4.9 %
LYMPHOCYTES # BLD AUTO: 0.6 K/UL
LYMPHOCYTES NFR BLD: 15.9 %
LYMPHOCYTES NFR FLD MANUAL: 44 %
MAGNESIUM SERPL-MCNC: 2 MG/DL
MCH RBC QN AUTO: 28.1 PG
MCHC RBC AUTO-ENTMCNC: 30.7 G/DL
MCV RBC AUTO: 92 FL
MESOTHL CELL NFR FLD MANUAL: 4 %
MONOCYTES # BLD AUTO: 0.6 K/UL
MONOCYTES NFR BLD: 16.7 %
MONOS+MACROS NFR FLD MANUAL: 44 %
NEUTROPHILS # BLD AUTO: 2.2 K/UL
NEUTROPHILS NFR BLD: 60.1 %
NEUTROPHILS NFR FLD MANUAL: 8 %
NRBC BLD-RTO: 0 /100 WBC
PHOSPHATE SERPL-MCNC: 2.6 MG/DL
PLATELET # BLD AUTO: 115 K/UL
PMV BLD AUTO: 10.3 FL
POCT GLUCOSE: 162 MG/DL (ref 70–110)
POCT GLUCOSE: 221 MG/DL (ref 70–110)
POCT GLUCOSE: 222 MG/DL (ref 70–110)
POCT GLUCOSE: 230 MG/DL (ref 70–110)
POCT GLUCOSE: 233 MG/DL (ref 70–110)
POCT GLUCOSE: 260 MG/DL (ref 70–110)
POCT GLUCOSE: 278 MG/DL (ref 70–110)
POCT GLUCOSE: 280 MG/DL (ref 70–110)
POCT GLUCOSE: 287 MG/DL (ref 70–110)
POCT GLUCOSE: 295 MG/DL (ref 70–110)
POCT GLUCOSE: 310 MG/DL (ref 70–110)
POTASSIUM SERPL-SCNC: 4.2 MMOL/L
PROT SERPL-MCNC: 5.4 G/DL
RBC # BLD AUTO: 2.74 M/UL
SODIUM SERPL-SCNC: 137 MMOL/L
TACROLIMUS BLD-MCNC: 5.6 NG/ML
WBC # BLD AUTO: 3.65 K/UL
WBC # FLD: 120 /CU MM

## 2018-12-11 PROCEDURE — 25000003 PHARM REV CODE 250: Performed by: PHYSICIAN ASSISTANT

## 2018-12-11 PROCEDURE — 87075 CULTR BACTERIA EXCEPT BLOOD: CPT

## 2018-12-11 PROCEDURE — 25000003 PHARM REV CODE 250: Performed by: NURSE PRACTITIONER

## 2018-12-11 PROCEDURE — 87205 SMEAR GRAM STAIN: CPT

## 2018-12-11 PROCEDURE — 99232 SBSQ HOSP IP/OBS MODERATE 35: CPT | Mod: ,,, | Performed by: NURSE PRACTITIONER

## 2018-12-11 PROCEDURE — 20600001 HC STEP DOWN PRIVATE ROOM

## 2018-12-11 PROCEDURE — 94799 UNLISTED PULMONARY SVC/PX: CPT

## 2018-12-11 PROCEDURE — 89051 BODY FLUID CELL COUNT: CPT

## 2018-12-11 PROCEDURE — 80197 ASSAY OF TACROLIMUS: CPT

## 2018-12-11 PROCEDURE — 63600175 PHARM REV CODE 636 W HCPCS: Performed by: TRANSPLANT SURGERY

## 2018-12-11 PROCEDURE — 80053 COMPREHEN METABOLIC PANEL: CPT

## 2018-12-11 PROCEDURE — 87102 FUNGUS ISOLATION CULTURE: CPT

## 2018-12-11 PROCEDURE — 63600175 PHARM REV CODE 636 W HCPCS: Performed by: RADIOLOGY

## 2018-12-11 PROCEDURE — 99900035 HC TECH TIME PER 15 MIN (STAT)

## 2018-12-11 PROCEDURE — 63600175 PHARM REV CODE 636 W HCPCS: Performed by: NURSE PRACTITIONER

## 2018-12-11 PROCEDURE — 25000003 PHARM REV CODE 250: Performed by: TRANSPLANT SURGERY

## 2018-12-11 PROCEDURE — 63600175 PHARM REV CODE 636 W HCPCS: Performed by: PHYSICIAN ASSISTANT

## 2018-12-11 PROCEDURE — 87070 CULTURE OTHR SPECIMN AEROBIC: CPT

## 2018-12-11 PROCEDURE — A4217 STERILE WATER/SALINE, 500 ML: HCPCS | Performed by: TRANSPLANT SURGERY

## 2018-12-11 PROCEDURE — 94664 DEMO&/EVAL PT USE INHALER: CPT

## 2018-12-11 PROCEDURE — 85025 COMPLETE CBC W/AUTO DIFF WBC: CPT

## 2018-12-11 PROCEDURE — 99233 SBSQ HOSP IP/OBS HIGH 50: CPT | Mod: 24,,, | Performed by: NURSE PRACTITIONER

## 2018-12-11 PROCEDURE — 84100 ASSAY OF PHOSPHORUS: CPT

## 2018-12-11 PROCEDURE — 27000646 HC AEROBIKA DEVICE

## 2018-12-11 PROCEDURE — 83735 ASSAY OF MAGNESIUM: CPT

## 2018-12-11 PROCEDURE — 0W9930Z DRAINAGE OF RIGHT PLEURAL CAVITY WITH DRAINAGE DEVICE, PERCUTANEOUS APPROACH: ICD-10-PCS | Performed by: RADIOLOGY

## 2018-12-11 PROCEDURE — 84478 ASSAY OF TRIGLYCERIDES: CPT

## 2018-12-11 RX ORDER — OCTREOTIDE ACETATE 100 UG/ML
50 INJECTION, SOLUTION INTRAVENOUS; SUBCUTANEOUS EVERY 8 HOURS
Status: DISCONTINUED | OUTPATIENT
Start: 2018-12-11 | End: 2018-12-18

## 2018-12-11 RX ORDER — FUROSEMIDE 10 MG/ML
20 INJECTION INTRAMUSCULAR; INTRAVENOUS ONCE
Status: COMPLETED | OUTPATIENT
Start: 2018-12-11 | End: 2018-12-11

## 2018-12-11 RX ORDER — MIDAZOLAM HYDROCHLORIDE 1 MG/ML
INJECTION INTRAMUSCULAR; INTRAVENOUS CODE/TRAUMA/SEDATION MEDICATION
Status: COMPLETED | OUTPATIENT
Start: 2018-12-11 | End: 2018-12-11

## 2018-12-11 RX ADMIN — CALCIUM GLUCONATE: 94 INJECTION, SOLUTION INTRAVENOUS at 09:12

## 2018-12-11 RX ADMIN — TACROLIMUS 1 MG: 1 CAPSULE ORAL at 05:12

## 2018-12-11 RX ADMIN — ACETAMINOPHEN 650 MG: 325 TABLET, FILM COATED ORAL at 05:12

## 2018-12-11 RX ADMIN — SODIUM CHLORIDE 1.2 UNITS/HR: 9 INJECTION, SOLUTION INTRAVENOUS at 03:12

## 2018-12-11 RX ADMIN — MIDAZOLAM HYDROCHLORIDE 1 MG: 1 INJECTION, SOLUTION INTRAMUSCULAR; INTRAVENOUS at 09:12

## 2018-12-11 RX ADMIN — ENTECAVIR 0.5 MG: 0.5 TABLET ORAL at 08:12

## 2018-12-11 RX ADMIN — MAGNESIUM OXIDE TAB 400 MG (241.3 MG ELEMENTAL MG) 800 MG: 400 (241.3 MG) TAB at 09:12

## 2018-12-11 RX ADMIN — ACETAMINOPHEN 650 MG: 325 TABLET, FILM COATED ORAL at 10:12

## 2018-12-11 RX ADMIN — HEPARIN SODIUM 5000 UNITS: 5000 INJECTION, SOLUTION INTRAVENOUS; SUBCUTANEOUS at 09:12

## 2018-12-11 RX ADMIN — MAGNESIUM OXIDE TAB 400 MG (241.3 MG ELEMENTAL MG) 800 MG: 400 (241.3 MG) TAB at 08:12

## 2018-12-11 RX ADMIN — TRAZODONE HYDROCHLORIDE 25 MG: 50 TABLET ORAL at 09:12

## 2018-12-11 RX ADMIN — TACROLIMUS 1 MG: 1 CAPSULE ORAL at 08:12

## 2018-12-11 RX ADMIN — TRAMADOL HYDROCHLORIDE 50 MG: 50 TABLET, FILM COATED ORAL at 02:12

## 2018-12-11 RX ADMIN — SODIUM CHLORIDE 2 UNITS/HR: 9 INJECTION, SOLUTION INTRAVENOUS at 09:12

## 2018-12-11 RX ADMIN — VALGANCICLOVIR 450 MG: 450 TABLET, FILM COATED ORAL at 09:12

## 2018-12-11 RX ADMIN — PANTOPRAZOLE SODIUM 40 MG: 40 TABLET, DELAYED RELEASE ORAL at 09:12

## 2018-12-11 RX ADMIN — FUROSEMIDE 20 MG: 10 INJECTION, SOLUTION INTRAMUSCULAR; INTRAVENOUS at 11:12

## 2018-12-11 RX ADMIN — VALGANCICLOVIR 450 MG: 450 TABLET, FILM COATED ORAL at 08:12

## 2018-12-11 RX ADMIN — OCTREOTIDE ACETATE 50 MCG: 100 INJECTION, SOLUTION INTRAVENOUS; SUBCUTANEOUS at 09:12

## 2018-12-11 RX ADMIN — METOPROLOL TARTRATE 25 MG: 25 TABLET ORAL at 08:12

## 2018-12-11 RX ADMIN — PANTOPRAZOLE SODIUM 40 MG: 40 TABLET, DELAYED RELEASE ORAL at 08:12

## 2018-12-11 RX ADMIN — ATOVAQUONE 1500 MG: 750 SUSPENSION ORAL at 08:12

## 2018-12-11 NOTE — PROGRESS NOTES
Wound care team follow up:  Pt presented with wound vac intact to abdominal chevron wounds x 2. Vac therapy goals have been met. Will discontinue vac and begin topical treAtment as discussed per Seble Tineo NP.     -Recommendation:  -Hydrofera Ready to abdominal chevron wounds with silicone border foams to cover.     Wound care to follow up as needed.       12/11/18 1042       Wound 11/27/18 1300 Other (comment) transverse Abdomen   Date First Assessed/Time First Assessed: 11/27/18 1300   Pre-existing: No  Primary Wound Type: (c) Other (comment)  Side: Right  Orientation: transverse  Location: Abdomen   Wound WDL ex   Dressing Appearance Intact;Clean   Drainage Amount Small   Drainage Characteristics/Odor Serosanguineous   Appearance Pink;Red   Tissue loss description Full thickness   Red (%), Wound Tissue Color 80 %   Yellow (%), Wound Tissue Color 20 %   Periwound Area Intact   Wound Length (cm) 0.6 cm   Wound Width (cm) 2.5 cm   Wound Depth (cm) 0.5 cm   Wound Volume (cm^3) 0.75 cm^3   Wound Surface Area (cm^2) 1.5 cm^2   Care Cleansed with:;Sterile normal saline;Removed:;Other (see comments)  (wound vac)   Dressing Applied;Foam;Other (see comments)  (hydrofera ready ordered)   Dressing Change Due 12/11/18

## 2018-12-11 NOTE — PROGRESS NOTES
"[facility]  Adult Nutrition  Progress Note    SUMMARY       Recommendations    Recommendation/Intervention: 1. When medically able, ADAT 2. Continue supplemental TPN as needed- meets >85% EEN/EPN 3. Dietitian Following  Goals: 1. Patient to meet >85% EEN/EPN. 2. Promote nutrition related labs WNL  Nutrition Goal Status: progressing towards goal  Communication of RD Recs: (POC)    Reason for Assessment    Reason For Assessment: RD follow-up  Diagnosis: transplant/postoperative complications(s/p OLTx 10/5)  Relevant Medical History: decompensated cirrhosis due to Hep B c/b ascites, DM  Interdisciplinary Rounds: attended  General Information Comments: Pt s/p OLTx 10/5 with numerous postop complications noted with severe malnutrition per NFPE 10/03/18. 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 Per pt (through [place] ) she weighed 65 kg 1 year ago and was on a strict diabetic diet per her doctor's orders so she lost some weight then, but then lost a lot more once she became sick. Pt was eating 2 meals/day prior to NPO status. Completed partial NFPE as pt was in a lot of pain: Pt continues to have severe temporal, clavicle and triceps wasting as well as protruding acromion process.  Update: Pt advanced to regular diet, which will hopefully improve kcal intake. TPN continues.  Nutrition Discharge Planning: Adequate po intake    Nutrition Risk Screen         Nutrition/Diet History    Patient Reported Diet/Restrictions/Preferences: diabetic diet, low salt(Cultural preferences)  Food Preferences: Cultural preferences Noted  Supplemental Drinks or Food Habits: Boost Plus, Ensure Plus(Strawberry)  Food Allergies: NKFA  Factors Affecting Nutritional Intake: clear liquid diet    Anthropometrics    Temp: 97.5 °F (36.4 °C)  Height Method: Stated  Height: 5' 3" (160 cm)  Height (inches): 63 " in  Weight Method: Bed Scale  Weight: 55 kg (121 lb 4.1 oz)  Weight (lb): 121.25 lb  Ideal Body Weight (IBW), Female: 115 lb  % Ideal Body Weight, Female (lb): 105.63 lb  BMI (Calculated): 21.6  (RETIRED) BMI Grade: 25 - 29.9 - overweight  Weight Loss: unintentional  Weight Loss Since Admission: 16 lb 0.4 oz       Lab/Procedures/Meds    Pertinent Labs Reviewed: reviewed  Pertinent Labs Comments: Glu 173, Aq1c 6%, H/H 7.7/25.1, BUN 46, Phos 2.6, Alk phos 185, Alb 2.9, Bili 1.4  Pertinent Medications Reviewed: reviewed  Pertinent Medications Comments: insulin regular magnesium oxide, pantoprazole    Physical Findings/Assessment    (RETIRED) Overall Physical Appearance: loss of subcutaneous fat, loss of muscle mass  RETIRED Oral/Mouth Cavity: tooth/teeth missing  (RETIRED) Skin: edema, intact, skin tear(Skin Tear Lower Abdomen)    Estimated/Assessed Needs    Weight Used For Calorie Calculations: 55.6 kg (122 lb 9.2 oz)  Energy Calorie Requirements (kcal): 1668  Energy Need Method: Kcal/kg(30 kcal/kg)  Protein Requirements: 66-78(1.2-1.4 gm/kg)  Weight Used For Protein Calculations: 55.6 kg (122 lb 9.2 oz)  Fluid Requirements (mL): 1 mL/kcal or per MD  Estimated Fluid Requirement Method: RDA Method  RDA Method (mL): 1668     Nutrition Prescription Ordered    Current Diet Order: Regular + TPN  Nutrition Order Comments: Fluid - 1500mL  Current Nutrition Support Formula Ordered: (60gm AA 350gm Dextrose)  Current Nutrition Support Rate Ordered: 50 (ml)  Current Nutrition Support Frequency Ordered: 50mL/hr x 24 hrs  Oral Nutrition Supplement: -    Evaluation of Received Nutrient/Fluid Intake    Parenteral Calories (kcal): 1430  Parenteral Protein (gm): 60  Parenteral Fluid (mL): 1200  Lipid Calories (kcals): 500 kcals  GIR (Glucose Infusion Rate) (mg/kg/min): 4.4 mg/kg/min  (RETIRED) % Kcal Needs: 86  (RETIRED) % Protein Needs: 88  I/O: +4.6L since admission; +921ml x 24 hrs  Protein Required: not meeting needs  Comments:  LBM 12/10  Tolerance: tolerating  % Intake of Estimated Energy Needs: 75 - 100 %  % Meal Intake: NPO(prior to today)    Nutrition Risk    Level of Risk/Frequency of Follow-up: high     Assessment and Plan    Severe malnutrition   Nutrition Problem  Severe Malnutrition in the context of Chronic Illness/Injury     Related to (etiology):  ESLD     Signs and Symptoms (as evidenced by):  Energy Intake: <75% of estimated energy requirement for 2 months  Body Fat Depletion: Severe depletion of orbitals and triceps   Muscle Mass Depletion: Severe depletion of temples, clavicle region and lower extremities as well as protruding acromion process   Weight Loss: 15% x 1 year       Nutrition Diagnosis Status:  Continues     Monitor and Evaluation    Food and Nutrient Intake: energy intake, parenteral nutrition intake  Food and Nutrient Adminstration: enteral and parenteral nutrition administration, diet order  Knowledge/Beliefs/Attitudes: food and nutrition knowledge/skill  Physical Activity and Function: nutrition-related ADLs and IADLs  Anthropometric Measurements: weight change, weight  Biochemical Data, Medical Tests and Procedures: electrolyte and renal panel, gastrointestinal profile, glucose/endocrine profile, inflammatory profile, lipid profile  Nutrition-Focused Physical Findings: overall appearance     Nutrition Follow-Up    RD Follow-up?: Yes

## 2018-12-11 NOTE — PLAN OF CARE
Problem: Patient Care Overview  Goal: Plan of Care Review  Recommendations     Recommendation/Intervention: 1. When medically able, ADAT 2. Continue supplemental TPN as needed- meets >85% EEN/EPN 3. Dietitian Following  Goals: 1. Patient to meet >85% EEN/EPN. 2. Promote nutrition related labs WNL       Assessment and Plan     Severe malnutrition   Nutrition Problem  Severe Malnutrition in the context of Chronic Illness/Injury     Related to (etiology):  ESLD     Signs and Symptoms (as evidenced by):  Energy Intake: <75% of estimated energy requirement for 2 months  Body Fat Depletion: Severe depletion of orbitals and triceps   Muscle Mass Depletion: Severe depletion of temples, clavicle region and lower extremities as well as protruding acromion process   Weight Loss: 15% x 1 year

## 2018-12-11 NOTE — PT/OT/SLP PROGRESS
Physical Therapy      Patient Name:  Scarlett Reddy   MRN:  [id number]    Patient not seen today secondary to (Pt off floor at IR. PT unable to return in PM.). Will follow-up at next scheduled session as able.    Radha Nguyen, PT, DPT   12/11/2018  [phone]

## 2018-12-11 NOTE — ASSESSMENT & PLAN NOTE
Managed per primary team  Avoid hypoglycemia    Recent Labs   Lab 12/11/18  0500   ALT 18   AST 20   ALKPHOS 185*   BILITOT 1.4*   PROT 5.4*   ALBUMIN 2.9*

## 2018-12-11 NOTE — ASSESSMENT & PLAN NOTE
- Middle chevron incision opened 11/16 with purulent drainage  - Packed with Aquacel ag at this time. Growing Klebsiella p.   - CT abd/pelvis 11/17- no signs of infection  - 11/17: R side of chevron incision w/ 3 small holes draining purulent fluid, opened and packed w/ aquacel.   - Wound care following.  - Wound w/ increased drainage, so wound care placed vac 11/24/18.  Apprec recs. Extending Ertapenem thru 11/28 per ID.    - Abdomen erythematous and indurated RLQ inferior to chevron.  - CT abd/pelvis 11/26 - no sign of infection  - Last wound vac change 12/7 with improvements noted in wound - depth now 0.5cm, so no longer needs vac per wound care

## 2018-12-11 NOTE — ASSESSMENT & PLAN NOTE
- Valcyte for CMV prophylaxis--> holding (10/31) for leukopenia. Pt will need weekly CMV PCR while discontinued.  - CMV PCR noted to be positive at 129. .  - CMV PCR 11/8: 105  - CMV PCR 11/15 with 89 copies  - CMV PCR 11/23 95 - increased from previous. Continue to hold Valcyte & monitor pt closely.  - CMV PCR 11/29 146  - CMV PCR 12/6 529 - start treatment dose valcyte 12/9/18  - Bactrim for PCP prophylaxis ([facility]).--> holding (10/31) for leukopenia.   - Start Atovaquone 11/6.  - Fluconazole for fungal prophylaxis (first dose 10/6). D/c Fluc 11/16 bc pt had been on it for > 30 days post take back.

## 2018-12-11 NOTE — PROGRESS NOTES
"[facility]  Endocrinology  Progress Note    Admit Date: 10/1/2018     Reason for Consult: Management of type 2 DM, Hyperglycemia      Surgical Procedure and Date: Liver transplant 10/5/18, Ex lap 10/6/18     Diabetes diagnosis year: 2013     Home Diabetes Medications:  Lantus 18 units HS and novolog 17 units with meals plus correction scale (150-200 +1)        Lab Results   Component Value Date     HGBA1C 6.8 (H) 08/16/2018        How often checking glucose at home? 3-4   BG readings on regimen: 120-150.  Post prandial readings as high as upper 200s  Hypoglycemia on the regimen? yes, none recently   Missed doses on regimen?  No     Diabetes Complications include:     Diabetic peripheral neuropathy      Complicating diabetes co morbidities:   CIRRHOSIS        HPI:  Patient is a 68 y.o. female with a diagnosis of ESLD, listed for liver transplant with meld 23 who underwent live transplant on 10/5/18.  Back to OR on 10/6/18 for ex lap.  Admitted 10/1/18 for hyponatremia s/p paracentesis.  Personal has known diagnosis of diabetes, endocrine consulted for diabetes management.    Post-operative course complicated by ESBL Klebsiella pneumoniae in urine (in contact isolation), peritransplant ascites, worsening renal function (required temporary dialysis), anemia (multiple blood transfusions), and possible GI bleed.     Interval HPI:   Overnight events: Remains in TSU. Chest tube procedure done on 12/11/18 with minimal complications per chart review. BG trending towards desired goal on Intensive Insulin Protocol requiring frequent titration.    Eating:   NPO  Nausea: No  Hypoglycemia and intervention: No  Fever: No  TPN and/or TF: Yes ( TPN)  If yes, type of TF/TPN and rate: 50 cc/hr     BP (!) 106/53   Pulse 107   Temp 97.5 °F (36.4 °C) (Oral)   Resp (!) 21   Ht 5' 3" (1.6 m)   Wt 55 kg (121 lb 4.1 oz)   LMP  (LMP Unknown)   SpO2 98%   Breastfeeding? No   BMI 21.48 kg/m²      Labs Reviewed and " Include    Recent Labs   Lab 12/11/18  0500   *   CALCIUM 8.9   ALBUMIN 2.9*   PROT 5.4*      K 4.2   CO2 24      BUN 46*   CREATININE 1.0   ALKPHOS 185*   ALT 18   AST 20   BILITOT 1.4*     Lab Results   Component Value Date    WBC 3.65 (L) 12/11/2018    HGB 7.7 (L) 12/11/2018    HCT 25.1 (L) 12/11/2018    MCV 92 12/11/2018     (L) 12/11/2018     No results for input(s): TSH, FREET4 in the last 168 hours.  Lab Results   Component Value Date    HGBA1C 6.0 (H) 10/04/2018       Nutritional status:   Body mass index is 21.48 kg/m².  Lab Results   Component Value Date    ALBUMIN 2.9 (L) 12/11/2018    ALBUMIN 2.9 (L) 12/10/2018    ALBUMIN 2.9 (L) 12/09/2018     Lab Results   Component Value Date    PREALBUMIN 13 (L) 12/10/2018    PREALBUMIN 15 (L) 11/03/2018    PREALBUMIN 12 (L) 09/17/2018       Estimated Creatinine Clearance: 43.9 mL/min (based on SCr of 1 mg/dL).    Accu-Checks  Recent Labs     12/10/18  1359 12/10/18  1610 12/10/18  1808 12/10/18  2023 12/10/18  2158 12/11/18  0004 12/11/18  0205 12/11/18  0400 12/11/18  0616 12/11/18  0758   POCTGLUCOSE 356* 357* 342* 269* 262* 287* 260* 233* 162* 230*       Current Medications and/or Treatments Impacting Glycemic Control  Immunotherapy:    Immunosuppressants         Stop Route Frequency     tacrolimus capsule 1 mg      -- Oral 2 times daily        Steroids:   Hormones (From admission, onward)    Start     Stop Route Frequency Ordered    12/03/18 1400  octreotide injection 50 mcg      -- SubQ Every 8 hours 12/03/18 1116    10/17/18 0945  predniSONE tablet 15 mg      10/24 0859 Oral Daily 10/17/18 0943        Pressors:    Autonomic Drugs (From admission, onward)    Start     Stop Route Frequency Ordered    10/06/18 1617  rocuronium (ZEMURON) 10 mg/mL injection     Comments:  Created by cabinet override    10/07 0429   10/06/18 1617        Hyperglycemia/Diabetes Medications:   Antihyperglycemics (From admission, onward)    Start     Stop Route  Frequency Ordered    12/10/18 1730  insulin regular (Humulin R) 100 Units in sodium chloride 0.9% 100 mL infusion     Question:  Insulin Rate Adjustment (DO NOT MODIFY ANSWER)  Answer:  [email or web address]    -- IV Continuous 12/10/18 1626    12/09/18 1554  insulin regular injection 4 Units      -- IV Once 12/09/18 1554    10/07/18 1200  insulin regular (Humulin R) 100 Units in sodium chloride 0.9% 100 mL infusion      10/13 2100 IV Continuous 10/07/18 1055          ASSESSMENT and PLAN    * Liver transplanted    Managed per primary team  Avoid hypoglycemia    Recent Labs   Lab 12/11/18  0500   ALT 18   AST 20   ALKPHOS 185*   BILITOT 1.4*   PROT 5.4*   ALBUMIN 2.9*            Type 2 diabetes mellitus with diabetic polyneuropathy, with long-term current use of insulin    BG goal 140-180   Continue Intensive Insulin Protocol   POCT glucose checks q 2 hours     Discharge planning:  MALCOLM Merlos NP  Endocrinology  [facility]

## 2018-12-11 NOTE — ASSESSMENT & PLAN NOTE
- Common post liver transplant  - CT 11/26 showed: Large right and moderate left pleural effusions with adjacent basilar atelectasis.  - Pt sp02 stable on RA, but c/o some orthopnea.  - CT abd/pelvis 12/3 showed unchanged b/l pleural effusions (large on R, moderate on L) with bibasilar compressive atelectasis and consolidative changes.  - Sp02 stable & no signs of respiratory infection  - repeat echo and chest xray with pleural effusion   - plan for thoracentesis with chest tube placement 12/11/18 - fluid negative for infection  - repeat chest xray in am

## 2018-12-11 NOTE — PT/OT/SLP PROGRESS
Occupational Therapy      Patient Name:  Scarlett Reddy   MRN:  [id number]    Patient not seen today secondary to Other (Comment)(Pt off the floor at IR). Writing therapist attempted pt in AM, but off the floor at IR. OT unable to return in PM. Will follow-up as scheduled.    Martín Martinez, OT  12/11/2018

## 2018-12-11 NOTE — PROCEDURES
Radiology Post-Procedure Note    Pre Op Diagnosis: Pleural effusion    Post Op Diagnosis: Pleural effusion    Procedure: Chest tube placement    Procedure performed by: Kareem Ballard MD    Written Informed Consent Obtained: Yes    Specimen Removed: YES 1.2 L of cloudy fluid    Estimated Blood Loss: Minimal    Findings: Local anesthesia and moderate sedation were used.    A right posterior approach was used to insert a 8.0-Portuguese  all-purpose drainage catheter into the pleural space using US guidance.  1200 mL cloudy fluid was removed and sent to the lab for further analysis.  The tube was secured using pigtail formation of the distal end as well as skin suture. Postprocedural imaging demonstrates decrease in size of the collection.    The patient tolerated the procedure well and there were no complications.  Please see Imaging report for further details.    Kareem Ballard M.D.  Diagnostic and Interventional Radiologist  Department of Radiology  Pager: [phone]

## 2018-12-11 NOTE — ASSESSMENT & PLAN NOTE
- obtain chest xray and echo  - diamox 500mg iv x 1 12/9/18  - lasix 40 mg iv x 1 12/10/18, another lasix 20 mg iv x 1  -  thoracentesis with chest tube placement today 12/11/18

## 2018-12-11 NOTE — PROGRESS NOTES
Pt sent down to IR for drainage.  VSS before leaving unit.  Report given to IR nurse.  No issues or complaints from pt.  Last accucheck 230.  Insulin gtt running @ 1.4 units/hr.  Next accucheck due @ 1000.      Will monitor for pts return to the unit.   at pts bedside.

## 2018-12-11 NOTE — ASSESSMENT & PLAN NOTE
- Paracentesis 10/1, 5L removed, no fluid sent for analysis.  - Pt remains volume overloaded.   - Liver US 11/6 with severe ascites.   - Paracentesis 11/7 with 8.9 L removed. Wbc 39, segs 16%. Gram stain - no organisms seen and no WBC. Cultures NGTD.  - Paracentesis 11/12: 8.6 L removed. 34 wbc, 25% segs, 33 lymphs, 42 monocytes/macrophages. Cultures NGTD  - Paracentesis 11/21 with 4 L removed, neg for infection.  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 Milky appearance, cell count of fluid (51% lymphs), triglycerides 2208, consistent with chyle leak.   - Peritoneal fluid 11/29 growing enterococcus and will plan for treatment with Vanc x 10 days per ID recs.   - Attempted low fat, high protein diet due to suspicion for chyle leak with no improvement seen in fluid character.  - CT abd/pelvis 12/3 shows small volume abdominal ascites, improved compared to prior, pelvis ascites unchanged from prior, unchanged b/l pleural effusions (large on R, moderate on L) with bibasilar compressive atelectasis and consolidative changes.  - Placed PICC line and started TPN + octreotide 12/3. Improvement in color of fluid seen 12/6 - 12/7 (less milky, more abbie). Will continue with TPN + adv diet to low fat at this time.  - Continue to monitor.  -lasix 40 mg iv x 1 12/8/18  - diamox 500mg iv x 1 12/9/18  - lasix 40 mg iv x 1 12/10/18  Lasix 20 mg iv x 1 12/11/18

## 2018-12-11 NOTE — ASSESSMENT & PLAN NOTE
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   - POD 1 US: increased arterial resistive indices, suggestive of edema vs acute rejection vs congestion.  - Repeat US 10/9 with continued elevation of RIs.  - LFTs stable.  - T bili trending down.   - Repeat liver US (10/11): elevated RIs.  - Liver US 11/6 to assess for ascites. No arterial flow within the liver allograft concerning for interval thrombosis/occlusion of the arterial system. Severe ascites seen.  - Liver US 11/8: arterial flow seen w/ elevated RIs   - LFTs remain stable. No CTA at this time due to EVA and normal liver function.   - Liver US 11/15 showed "abnormally decreased main hepatic artery peak systolic velocity with slow arterial flow and abnormal tardus parvus waveforms intrahepatically."   -Vascular surgery consulted  - Liver US 11/19 showed no arterial flow on the right and sluggish flow on the left.   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 No further intervention planned at this time as patient's LFTs are stable. Will monitor.  - CT abd/pelvis 11/26 showed: 1. Persistent large volume of ascites. 2. Large right and moderate left pleural effusions with adjacent basilar atelectasis. 3. Anasarca  - IR placed perc drain 11/29 due to suspicion for chyle leak. 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  - Fluid also growing enterococcus and will plan for treatment with Vanc x 10 days per ID recs, completed.   "

## 2018-12-11 NOTE — SUBJECTIVE & OBJECTIVE
Scheduled Meds:   aspirin  81 mg Oral Daily    atovaquone  1,500 mg Oral Daily    entecavir  0.5 mg Oral Daily    heparin (porcine)  5,000 Units Subcutaneous Q12H    insulin regular  4 Units Intravenous Once    magnesium oxide  800 mg Oral BID    metoprolol tartrate  25 mg Oral BID    octreotide  50 mcg Subcutaneous Q8H    pantoprazole  40 mg Oral BID    sodium chloride 0.9%  10 mL Intravenous Q6H    tacrolimus  1 mg Oral BID    traZODone  25 mg Oral QHS    valGANciclovir  450 mg Oral BID     Continuous Infusions:   insulin (HUMAN R) infusion (adults) 2.4 Units/hr (12/11/18 1305)    TPN ADULT CENTRAL LINE CUSTOM 50 mL/hr at 12/10/18 2150    TPN ADULT CENTRAL LINE CUSTOM      TPN ADULT CENTRAL LINE CUSTOM       PRN Meds:sodium chloride, sodium chloride, sodium chloride, acetaminophen, bisacodyl, calcium carbonate, dextrose 50%, dextrose 50%, omnipaque, ondansetron, ondansetron, polyethylene glycol, Flushing PICC Protocol **AND** sodium chloride 0.9% **AND** sodium chloride 0.9%, sodium chloride 0.9%, traMADol    Review of Systems   Constitutional: Positive for activity change and appetite change.   Respiratory: Positive for shortness of breath.    Cardiovascular: Positive for leg swelling. Negative for chest pain and palpitations.   Gastrointestinal: Positive for abdominal distention and abdominal pain.   Allergic/Immunologic: Positive for immunocompromised state.   Psychiatric/Behavioral: Positive for dysphoric mood and sleep disturbance.     Objective:     Vital Signs (Most Recent):  Temp: 97.5 °F (36.4 °C) (12/11/18 1147)  Pulse: 99 (12/11/18 1114)  Resp: (!) 21 (12/11/18 1114)  BP: 128/80 (12/11/18 1114)  SpO2: 100 % (12/11/18 1114) Vital Signs (24h Range):  Temp:  [97.5 °F (36.4 °C)-98.8 °F (37.1 °C)] 97.5 °F (36.4 °C)  Pulse:  [] 99  Resp:  [18-26] 21  SpO2:  [98 %-100 %] 100 %  BP: (106-138)/(53-81) 128/80     Weight: 55 kg (121 lb 4.1 oz)  Body mass index is 21.48  kg/m².    Intake/Output - Last 3 Shifts       12/09 0700 - 12/10 0659 12/10 0700 - 12/11 0659 12/11 0700 - 12/12 0659    P.O. 255 455 30    I.V. (mL/kg) 35 (0.6) 40.7 (0.7) 5.2 (0.1)    Other   0    IV Piggyback       TPN 1300 900     Total Intake(mL/kg) 1590 (28.9) 1395.7 (25.4) 35.2 (0.6)    Urine (mL/kg/hr) 0 (0) 0 (0)     Emesis/NG output  0     Drains 115 200 90    Other  0 1200    Stool  0     Blood  0     Total Output     Net +1475 +1195.7 -1254.8           Urine Occurrence 2 x 256 x     Stool Occurrence 0 x 1 x     Emesis Occurrence  0 x           Physical Exam   Constitutional: She is oriented to person, place, and time.   Hand and temporal muscle wasting   HENT:   Head: Normocephalic.   Eyes: Scleral icterus is present.   Neck: Normal range of motion.   Cardiovascular: Normal rate, regular rhythm, normal heart sounds and intact distal pulses.   Pulmonary/Chest: Effort normal. She has decreased breath sounds in the right lower field and the left lower field.   Abdominal: Soft. Bowel sounds are normal. She exhibits distension and ascites.       Musculoskeletal: Normal range of motion. She exhibits edema.   Neurological: She is alert and oriented to person, place, and time.   Skin: Skin is warm and dry.   Psychiatric: She has a normal mood and affect. Her behavior is normal.   Nursing note and vitals reviewed.      Laboratory:  Immunosuppressants         Stop Route Frequency     tacrolimus capsule 1 mg      -- Oral 2 times daily        CBC:   Recent Labs   Lab 12/11/18  0500   WBC 3.65*   RBC 2.74*   HGB 7.7*   HCT 25.1*   *   MCV 92   MCH 28.1   MCHC 30.7*     CMP:   Recent Labs   Lab 12/11/18  0500   *   CALCIUM 8.9   ALBUMIN 2.9*   PROT 5.4*      K 4.2   CO2 24      BUN 46*   CREATININE 1.0   ALKPHOS 185*   ALT 18   AST 20   BILITOT 1.4*     Coagulation: No results for input(s): PT, INR, APTT in the last 168 hours.  Labs within the past 24 hours have been  reviewed.    Diagnostic Results:  None

## 2018-12-11 NOTE — PROGRESS NOTES
Pt back from IR with chest tube in place.  1.2L removed in IR.  Drain currently placed to water seal.  Large amount of SS output noted.  Pt c/o severe pain to the R side.  Pt refusing any pain medication.  Pt agreeing to take tylenol at this time.  Seble Tineo NP notified.  Pt able to take PRN tramadol if needed.  KEEGAN Tineo to see pt at the bedside.    Update 1400:  Pt still c/o severe R side pain.  Drain now placed to suction.  Pt still w/ SS output noted.  Pt agreeable to take tramadol at this time due to 10/10 pain.    Update 1700:  Pt in 10/10 pain.  Denies wanting any pain medication at this time.  Tylenol given.  Will continue to monitor.  Pt able to rest comfortably at this time in bed.

## 2018-12-11 NOTE — PROGRESS NOTES
[facility]  Liver Transplant  Progress Note    Patient Name: Scarlett Reddy  MRN: [id number]  Admission Date: 10/1/2018  Hospital Length of Stay: 70 days  Code Status: Full Code  Primary Care Provider: Primary Doctor No  Post-Operative Day: 67    ORGAN:   LIVER  Disease Etiology: Cirrhosis: Type B and D  Donor Type:    - Brain Death  CDC High Risk:   No  Donor CMV Status:   Donor CMV Status: Positive  Donor HBcAB:   Negative  Donor HCV Status:   Negative  Donor HBV SETH: Negative  Donor HCV SETH: Negative  Whole or Partial: Whole Liver  Biliary Anastomosis: End to End  Arterial Anatomy: Replaced Left Hepatic and Right Hepatic  Subjective:     History of Present Illness:  Ms. Reddy is a 69 y/o female with PMH of ESLD secondary Hep B and D.  Listed for liver transplant with MELD 23.  Paracentesis 10/1 with 5L removed, no fluid sent for analysis.  Morning labs significant for hyponatremia, Na 119.  Pt admitted to LTS for sodium monitoring.        Hospital Course:  Hospital Course: 69 y/o F h/o HBV/delta cirrhosis s/p DBDLT 10/5/18 (CMV D+/R+, steroid induction, MMF/tacro/pred maintenance) with take back on 10/6 2/2 hyperbilirubinema and bilious VALORIE drainage, no leak identified. Post-op course c/b hypercapneic and hypoxic respiratory failure and was reintubated though quickly extubated now doing well. Liver bx (10/6) sig for Zone 3 hemorrhage and collapse, in addition to cholestasis. Transferred from ICU on POD #4. ID consulted to comment on positive diphtheroid culture from ascitic fluid (likely skin colonization) as well as ESBL Klebsiella pneumoniae in urine culture (10/4).  Pt asymptomatic and cell count from ascitic fluid unremarkable. Per ID recs, no need to treat diphtheroids or asymptomatic ESBL Kleb pneumo bacteriuria though pt has had 6 days of therapy which would cover these organisms. Mild peritransplant ascites- repeat US 10/6 with increased arterial resistive indices. Repeat US 10/9  w/ continued elevation in RIs and fluid collections. LFTs trending down nicely.     Acute gradual worsening of renal function s/p OLTx. Creatinine on arrival 0.8 and has been up trending. Nephrology consulted for post-op EVA. Cr cont to trend up but remains to have good UOP. Had stable response to lasiz diuresis.  BUN elevated requiring multiple rounds of dialysis (10/15, 10/17, 10/20, 10/31). No significant improvements in kidney function noted.   In addition, pt H/H cont to fluctuate requiring multiple blood transfusions (10/14, 10/16, 10/18, 10/19, 10/22, 10/27, 10/29).  Pt reported dark stools 10/15 but color has normalized. FOBT+. EGD 10/18 consistent with ulcerated mucosa in duodenum s/p coagulation. Concern for H. Pylori. Started Amoxicillin/levofloxacin (10/19, complete 11/2)). Remains on Protonix 40 mg BID. Biopsy negative for infection and CMV. Will cont close monitoring H/H, transfuse as needed with goal Hb > 7.0. Of note, Urine cx + Klebsiella pneumoniae (10/13). Received 3 day course of ciprofloxacin, dc'd 10/19.   On 10/19 pm pt c/o crushing chest pain. Troponin 0.094, likely 2/2 ischemic stress. EKG NSR.   Liver US 11/6 showed no arterial flow. Paracentesis 11/7 with 8.9 L removed. Wbc 39, segs 16%. Gram stain no organisms seen + no WBC. Pt feels better since having fluid removed but she continues to have worsening abdominal distention & LE edema.Repeat Liver US 11/8 showed arterial flow with increased RIs. Paracentesis 11/12 with 8.6L removed (WBC 34, segs 25%). 11/15 liver u/s showed abnormally decreased main hepatic artery peak systolic velocity with slow arterial flow and abnormal tardus parvus waveforms intrahepatically. Consulted vascular surgery. Repeat liver US 11/19 showed no arterial flow on R and sluggish flow on L. Hepatic angiogram 11/19 confirmed HAT.  Friday 11/16, purulent drainage noted from middle of chevron incision. Area opened and packed. Cx grew klebsiella p. CT scan showed no  signs of infection. On Monday 11/18, it was noted that the right/lateral side of the chevron had 3 small openings draining purulent fluid. Opened the area and packed. Vac placed on 11/24. Wound care following. CT abd/pelvis 11/3 showed small volume abdominal ascites, improved compared to prior, pelvis ascites unchanged from prior, unchanged b/l pleural effusions (large on R, moderate on L) with bibasilar compressive atelectasis and consolidative changes. Patient finished day 7 of 7 of Ertapenem for kleb pneumo ESBL UTI 11/29. Of note, pt grew same bacteria from wound 11/16. Liver US to assess recanalization post HAT 12/4 -> conclusion that pt may recanalize. Will monitor closely with no further intervention planned at this time.    Interval history: No acute events overnight. TPN + octreotide started 12/3. Peritoneal fluid (perc drain) continues to be yellow milky in nature.  Will continue w/ TPN + again make npo. Peritoneal fluid 11/29 w/ enterococcus. Per ID, treating with Vanc x 10 days and then repeat peritoneal fluid cx. Wound vac changed 12/7, w/ improvements noted.  Wound depth has improved to 0.5 cm and no longer needs wound vac.  Complaining of not sleeping well at night due to shortness of breath. Chest xray and echo with pleural effusions. Right thoracentesis with chest tube placement done 12/11 with 1.2L removed.  Fluid sent for lab analysis pending.  Continue to diurese with lasix.  Also add trazodone to aid insomnia.    Scheduled Meds:   aspirin  81 mg Oral Daily    atovaquone  1,500 mg Oral Daily    entecavir  0.5 mg Oral Daily    heparin (porcine)  5,000 Units Subcutaneous Q12H    insulin regular  4 Units Intravenous Once    magnesium oxide  800 mg Oral BID    metoprolol tartrate  25 mg Oral BID    octreotide  50 mcg Subcutaneous Q8H    pantoprazole  40 mg Oral BID    sodium chloride 0.9%  10 mL Intravenous Q6H    tacrolimus  1 mg Oral BID    traZODone  25 mg Oral QHS    valGANciclovir   450 mg Oral BID     Continuous Infusions:   insulin (HUMAN R) infusion (adults) 2.4 Units/hr (12/11/18 1305)    TPN ADULT CENTRAL LINE CUSTOM 50 mL/hr at 12/10/18 2150    TPN ADULT CENTRAL LINE CUSTOM      TPN ADULT CENTRAL LINE CUSTOM       PRN Meds:sodium chloride, sodium chloride, sodium chloride, acetaminophen, bisacodyl, calcium carbonate, dextrose 50%, dextrose 50%, omnipaque, ondansetron, ondansetron, polyethylene glycol, Flushing PICC Protocol **AND** sodium chloride 0.9% **AND** sodium chloride 0.9%, sodium chloride 0.9%, traMADol    Review of Systems   Constitutional: Positive for activity change and appetite change.   Respiratory: Positive for shortness of breath.    Cardiovascular: Positive for leg swelling. Negative for chest pain and palpitations.   Gastrointestinal: Positive for abdominal distention and abdominal pain.   Allergic/Immunologic: Positive for immunocompromised state.   Psychiatric/Behavioral: Positive for dysphoric mood and sleep disturbance.     Objective:     Vital Signs (Most Recent):  Temp: 97.5 °F (36.4 °C) (12/11/18 1147)  Pulse: 99 (12/11/18 1114)  Resp: (!) 21 (12/11/18 1114)  BP: 128/80 (12/11/18 1114)  SpO2: 100 % (12/11/18 1114) Vital Signs (24h Range):  Temp:  [97.5 °F (36.4 °C)-98.8 °F (37.1 °C)] 97.5 °F (36.4 °C)  Pulse:  [] 99  Resp:  [18-26] 21  SpO2:  [98 %-100 %] 100 %  BP: (106-138)/(53-81) 128/80     Weight: 55 kg (121 lb 4.1 oz)  Body mass index is 21.48 kg/m².    Intake/Output - Last 3 Shifts       12/09 0700 - 12/10 0659 12/10 0700 - 12/11 0659 12/11 0700 - 12/12 0659    P.O. 255 455 30    I.V. (mL/kg) 35 (0.6) 40.7 (0.7) 5.2 (0.1)    Other   0    IV Piggyback       TPN 1300 900     Total Intake(mL/kg) 1590 (28.9) 1395.7 (25.4) 35.2 (0.6)    Urine (mL/kg/hr) 0 (0) 0 (0)     Emesis/NG output  0     Drains 115 200 90    Other  0 1200    Stool  0     Blood  0     Total Output     Net +1475 +1195.7 -1254.8           Urine Occurrence 2 x 256 x      Stool Occurrence 0 x 1 x     Emesis Occurrence  0 x           Physical Exam   Constitutional: She is oriented to person, place, and time.   Hand and temporal muscle wasting   HENT:   Head: Normocephalic.   Eyes: Scleral icterus is present.   Neck: Normal range of motion.   Cardiovascular: Normal rate, regular rhythm, normal heart sounds and intact distal pulses.   Pulmonary/Chest: Effort normal. She has decreased breath sounds in the right lower field and the left lower field.   Abdominal: Soft. Bowel sounds are normal. She exhibits distension and ascites.       Musculoskeletal: Normal range of motion. She exhibits edema.   Neurological: She is alert and oriented to person, place, and time.   Skin: Skin is warm and dry.   Psychiatric: She has a normal mood and affect. Her behavior is normal.   Nursing note and vitals reviewed.      Laboratory:  Immunosuppressants         Stop Route Frequency     tacrolimus capsule 1 mg      -- Oral 2 times daily        CBC:   Recent Labs   Lab 12/11/18  0500   WBC 3.65*   RBC 2.74*   HGB 7.7*   HCT 25.1*   *   MCV 92   MCH 28.1   MCHC 30.7*     CMP:   Recent Labs   Lab 12/11/18  0500   *   CALCIUM 8.9   ALBUMIN 2.9*   PROT 5.4*      K 4.2   CO2 24      BUN 46*   CREATININE 1.0   ALKPHOS 185*   ALT 18   AST 20   BILITOT 1.4*     Coagulation: No results for input(s): PT, INR, APTT in the last 168 hours.  Labs within the past 24 hours have been reviewed.    Diagnostic Results:  None    Assessment/Plan:     * Liver transplanted    - PMHx of ESLD secondary to hep B cirrhosis, now s/p liver transplant on 10/5, with takeback for hyperbilirubinemia and bilious VALORIE output 10/6; no biliary leak identified.   - POD 1 US: increased arterial resistive indices, suggestive of edema vs acute rejection vs congestion.  - Repeat US 10/9 with continued elevation of RIs.  - LFTs stable.  - T bili trending down.   - Repeat liver US (10/11): elevated RIs.  - Liver US 11/6 to  "assess for ascites. No arterial flow within the liver allograft concerning for interval thrombosis/occlusion of the arterial system. Severe ascites seen.  - Liver US 11/8: arterial flow seen w/ elevated RIs   - LFTs remain stable. No CTA at this time due to EVA and normal liver function.   - Liver US 11/15 showed "abnormally decreased main hepatic artery peak systolic velocity with slow arterial flow and abnormal tardus parvus waveforms intrahepatically."   -Vascular surgery consulted  - Liver US 11/19 showed no arterial flow on the right and sluggish flow on the left.   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 No further intervention planned at this time as patient's LFTs are stable. Will monitor.  - CT abd/pelvis 11/26 showed: 1. Persistent large volume of ascites. 2. Large right and moderate left pleural effusions with adjacent basilar atelectasis. 3. Anasarca  - IR placed perc drain 11/29 due to suspicion for chyle leak. 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  - Fluid also growing enterococcus and will plan for treatment with Vanc x 10 days per ID recs, completed.      Other ascites    - Paracentesis 10/1, 5L removed, no fluid sent for analysis.  - Pt remains volume overloaded.   - Liver US 11/6 with severe ascites.   - Paracentesis 11/7 with 8.9 L removed. Wbc 39, segs 16%. Gram stain - no organisms seen and no WBC. Cultures NGTD.  - Paracentesis 11/12: 8.6 L removed. 34 wbc, 25% segs, 33 lymphs, 42 monocytes/macrophages. Cultures NGTD  - Paracentesis 11/21 with 4 L removed, neg for infection.  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 Milky appearance, cell count of fluid (51% lymphs), triglycerides 2208, consistent with chyle leak.   - Peritoneal fluid 11/29 growing enterococcus and will plan for treatment with Vanc x 10 days per ID recs.   - Attempted low fat, high protein diet due to suspicion for chyle " leak with no improvement seen in fluid character.  - CT abd/pelvis 12/3 shows small volume abdominal ascites, improved compared to prior, pelvis ascites unchanged from prior, unchanged b/l pleural effusions (large on R, moderate on L) with bibasilar compressive atelectasis and consolidative changes.  - Placed PICC line and started TPN + octreotide 12/3. Improvement in color of fluid seen 12/6 - 12/7 (less milky, more abbie). Will continue with TPN + adv diet to low fat at this time.  - Continue to monitor.  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  - Tx w/ Entecavir- dose changed to daily 11/26 due to improved renal fx  - 12/3 Hep B surface antigen negative 12/3, surface AB quant 571, and Hep B DNA <10.  - Continue to monitor, per protocol.     Type 2 diabetes mellitus with diabetic polyneuropathy, with long-term current use of insulin    - Continue home regimen.  - TPN started 12/3 and pt now on insulin drip.  - Endocrine following. Appreciate recs.       Sleeps in sitting position due to orthopnea    - obtain chest xray and echo  - diamox 500mg iv x 1 12/9/18  - lasix 40 mg iv x 1 12/10/18, another lasix 20 mg iv x 1  -  thoracentesis with chest tube placement today 12/11/18       Hypoalbuminemia    - Continue to monitor.     Sequelae of hyperalimentation    - Monitor.       Pleural effusion    - Common post liver transplant  - CT 11/26 showed: Large right and moderate left pleural effusions with adjacent basilar atelectasis.  - Pt sp02 stable on RA, but c/o some orthopnea.  - CT abd/pelvis 12/3 showed unchanged b/l pleural effusions (large on R, moderate on L) with bibasilar compressive atelectasis and consolidative changes.  - Sp02 stable & no signs of respiratory infection  - repeat echo and chest xray with pleural effusion   -  "plan for thoracentesis with chest tube placement 12/11/18 - fluid negative for infection  - repeat chest xray in am     Hypomagnesemia    - Continue PO Mag Oxide  - Continue to monitor.       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       Delayed surgical wound healing    - Middle chevron incision opened 11/16 with purulent drainage  - Packed with Aquacel ag at this time. Growing Klebsiella p.   - CT abd/pelvis 11/17- no signs of infection  - 11/17: R side of chevron incision w/ 3 small holes draining purulent fluid, opened and packed w/ aquacel.   - Wound care following.  - Wound w/ increased drainage, so wound care placed vac 11/24/18.  Apprec recs. Extending Ertapenem thru 11/28 per ID.    - Abdomen erythematous and indurated RLQ inferior to chevron.  - CT abd/pelvis 11/26 - no sign of infection  - Last wound vac change 12/7 with improvements noted in wound - depth now 0.5cm, so no longer needs vac per wound care     Hepatic artery thrombosis of transplanted liver    - Liver US 11/19 showed no arterial flow to R and sluggish flow to L  - Hepatic angiogram 11/19 confirmed HAT  - Liver US 12/4 to evaluate for recanalization reviewed. Recanalization possible. Will continue to monitor.  - No further intervention planned at this time. Will continue to monitor.     Other drug-induced pancytopenia    - Decreased plt, wbc, h/h  - See "leukopenia"  - Continue to monitor.     Thrombocytopenia, unspecified    - Monitor.       Leukopenia    - Stopped Cellcept, Bactrim, and Valcyte 10/31   - Cellcept half dose resumed 11/6  - Started Atovaquone 11/6.   - CMV PCR noted to be positive at 129.Helld off on treatment with valcyte, however weekly CMV PCRs increasing, start treatment dose 12/9/18  - Cellcept held 11/23 2/2 infection (UTI + wound)  - Continue to monitor.       At risk for opportunistic infections    - Valcyte for " CMV prophylaxis--> holding (10/31) for leukopenia. Pt will need weekly CMV PCR while discontinued.  - CMV PCR noted to be positive at 129. .  - CMV PCR 11/8: 105  - CMV PCR 11/15 with 89 copies  - CMV PCR 11/23 95 - increased from previous. Continue to hold Valcyte & monitor pt closely.  - CMV PCR 11/29 146  - CMV PCR 12/6 529 - start treatment dose valcyte 12/9/18  - Bactrim for PCP prophylaxis (MW).--> holding (10/31) for leukopenia.   - Start Atovaquone 11/6.  - Fluconazole for fungal prophylaxis (first dose 10/6). D/c Fluc 11/16 bc pt had been on it for > 30 days post take back.       Long-term use of immunosuppressant medication    - Prograf: stopped 10/29. Resumed 11/2.  --> Will monitor for signs of toxic side effects, check daily troughs, and change meds accordingly.   - Prednisone: stopped 10/29. Started on Hydrocortisone. pred taper resumed.  - Cellcept: stopped 10/31 for leukopenia. Restarted Cellcept 500 mg bid, 11/6.  - Cellcept held 11/23 2/2 infection         Prophylactic immunotherapy    - See long term use immunosuppresion.       Physical deconditioning    - PT/OT consulted.  Progressing well w/ therapy.  - Recommend home health PT and rolling walker at discharge (orders placed 11/6).       Anemia requiring transfusions    - Fluctuating H&H.    - Transfusions: 10/14; 10/16; 10/18; 10/19; 10/22; 10/27; 10/29, 10/31, 11/3, 11/21, 11/29, 12/4  - FOBT +  - LDH elevated, Hapto < 10.  - EGD 10/18: ulcerated duodenal mucosa.   - Consulted GI about this ongoing issue and further need of another EGD.  - EGD 11/5 unremarkable.   - Iron studies 11/13:  95, TIBC 107, sat iron 89, transferrin 72, ferritin pending  - Hemolytic anemia workup 11/13: Haptoglobin 132, retic 3.9   - H/H dropped 12/4 - transfused 2u PRBC with good response  - No overt signs of bleeding.  - Continue to monitor.           Severe malnutrition    - Dietary consulted.   - Temporal and distal extremity muscle wasting and edema.  -  Encourage supplements and to increase nutritional intake.  - Per nephrology, pt to be on low protein diet.  - Prealbumin reviewed.   - Tolerating diet.  Denies n/v.    - Attempted low fat, high protein diet due to suspicion for chyle leak with no improvement seen in fluid character.  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 Had advanced diet with return of milky drainage, will make npo & continue tpn for now.    - Monitor.             VTE Risk Mitigation (From admission, onward)        Ordered     heparin (porcine) injection 5,000 Units  Every 12 hours      12/06/18 1058     IP VTE HIGH RISK PATIENT  Once      11/05/18 1145     Place sequential compression device  Until discontinued      10/05/18 0709          The patients clinical status was discussed at multidisplinary rounds, involving transplant surgery, transplant medicine, pharmacy, nursing, nutrition, and social work    Discharge Planning:  Not candidate at this time  No Patient Care Coordination Note on file.      Seble Tineo, NP  Liver Transplant  [facility]

## 2018-12-11 NOTE — SUBJECTIVE & OBJECTIVE
"Interval HPI:   Overnight events: Remains in TSU. Chest tube procedure done on 12/11/18 with minimal complications per chart review. BG trending towards desired goal on IIP requiring frequent titration.    Eating:   NPO  Nausea: No  Hypoglycemia and intervention: No  Fever: No  TPN and/or TF: Yes ( TPN)  If yes, type of TF/TPN and rate: 50 cc/hr     BP (!) 106/53   Pulse 107   Temp 97.5 °F (36.4 °C) (Oral)   Resp (!) 21   Ht 5' 3" (1.6 m)   Wt 55 kg (121 lb 4.1 oz)   LMP  (LMP Unknown)   SpO2 98%   Breastfeeding? No   BMI 21.48 kg/m²     Labs Reviewed and Include    Recent Labs   Lab 12/11/18  0500   *   CALCIUM 8.9   ALBUMIN 2.9*   PROT 5.4*      K 4.2   CO2 24      BUN 46*   CREATININE 1.0   ALKPHOS 185*   ALT 18   AST 20   BILITOT 1.4*     Lab Results   Component Value Date    WBC 3.65 (L) 12/11/2018    HGB 7.7 (L) 12/11/2018    HCT 25.1 (L) 12/11/2018    MCV 92 12/11/2018     (L) 12/11/2018     No results for input(s): TSH, FREET4 in the last 168 hours.  Lab Results   Component Value Date    HGBA1C 6.0 (H) 10/04/2018       Nutritional status:   Body mass index is 21.48 kg/m².  Lab Results   Component Value Date    ALBUMIN 2.9 (L) 12/11/2018    ALBUMIN 2.9 (L) 12/10/2018    ALBUMIN 2.9 (L) 12/09/2018     Lab Results   Component Value Date    PREALBUMIN 13 (L) 12/10/2018    PREALBUMIN 15 (L) 11/03/2018    PREALBUMIN 12 (L) 09/17/2018       Estimated Creatinine Clearance: 43.9 mL/min (based on SCr of 1 mg/dL).    Accu-Checks  Recent Labs     12/10/18  1359 12/10/18  1610 12/10/18  1808 12/10/18  2023 12/10/18  2158 12/11/18  0004 12/11/18  0205 12/11/18  0400 12/11/18  0616 12/11/18  0758   POCTGLUCOSE 356* 357* 342* 269* 262* 287* 260* 233* 162* 230*       Current Medications and/or Treatments Impacting Glycemic Control  Immunotherapy:    Immunosuppressants         Stop Route Frequency     tacrolimus capsule 1 mg      -- Oral 2 times daily        Steroids:   Hormones (From " admission, onward)    Start     Stop Route Frequency Ordered    12/03/18 1400  octreotide injection 50 mcg      -- SubQ Every 8 hours 12/03/18 1116    10/17/18 0945  predniSONE tablet 15 mg      10/24 0859 Oral Daily 10/17/18 0943        Pressors:    Autonomic Drugs (From admission, onward)    Start     Stop Route Frequency Ordered    10/06/18 1617  rocuronium (ZEMURON) 10 mg/mL injection     Comments:  Created by cabinet override    10/07 0429   10/06/18 1617        Hyperglycemia/Diabetes Medications:   Antihyperglycemics (From admission, onward)    Start     Stop Route Frequency Ordered    12/10/18 1730  insulin regular (Humulin R) 100 Units in sodium chloride 0.9% 100 mL infusion     Question:  Insulin Rate Adjustment (DO NOT MODIFY ANSWER)  Answer:  [email or web address]    -- IV Continuous 12/10/18 1626    12/09/18 1554  insulin regular injection 4 Units      -- IV Once 12/09/18 1554    10/07/18 1200  insulin regular (Humulin R) 100 Units in sodium chloride 0.9% 100 mL infusion      10/13 2100 IV Continuous 10/07/18 1055

## 2018-12-12 LAB
ABO + RH BLD: NORMAL
ALBUMIN SERPL BCP-MCNC: 2.5 G/DL
ALP SERPL-CCNC: 188 U/L
ALT SERPL W/O P-5'-P-CCNC: 22 U/L
ANION GAP SERPL CALC-SCNC: 7 MMOL/L
AST SERPL-CCNC: 22 U/L
BASOPHILS # BLD AUTO: 0.03 K/UL
BASOPHILS NFR BLD: 0.7 %
BILIRUB SERPL-MCNC: 1 MG/DL
BLD GP AB SCN CELLS X3 SERPL QL: NORMAL
BLOOD GROUP ANTIBODIES SERPL: NORMAL
BUN SERPL-MCNC: 46 MG/DL
CALCIUM SERPL-MCNC: 8.5 MG/DL
CHLORIDE SERPL-SCNC: 106 MMOL/L
CO2 SERPL-SCNC: 25 MMOL/L
CREAT SERPL-MCNC: 1 MG/DL
DIFFERENTIAL METHOD: ABNORMAL
EOSINOPHIL # BLD AUTO: 0.1 K/UL
EOSINOPHIL NFR BLD: 1.9 %
ERYTHROCYTE [DISTWIDTH] IN BLOOD BY AUTOMATED COUNT: 16.1 %
EST. GFR  (AFRICAN AMERICAN): >60 ML/MIN/1.73 M^2
EST. GFR  (NON AFRICAN AMERICAN): 57.6 ML/MIN/1.73 M^2
FUNGUS SPEC CULT: NORMAL
GLUCOSE SERPL-MCNC: 223 MG/DL
HCT VFR BLD AUTO: 25.8 %
HGB BLD-MCNC: 8.1 G/DL
IMM GRANULOCYTES # BLD AUTO: 0.17 K/UL
IMM GRANULOCYTES NFR BLD AUTO: 4.1 %
LYMPHOCYTES # BLD AUTO: 0.6 K/UL
LYMPHOCYTES NFR BLD: 14.1 %
MAGNESIUM SERPL-MCNC: 2 MG/DL
MCH RBC QN AUTO: 29.1 PG
MCHC RBC AUTO-ENTMCNC: 31.4 G/DL
MCV RBC AUTO: 93 FL
MONOCYTES # BLD AUTO: 0.6 K/UL
MONOCYTES NFR BLD: 13.6 %
NEUTROPHILS # BLD AUTO: 2.8 K/UL
NEUTROPHILS NFR BLD: 65.6 %
NRBC BLD-RTO: 0 /100 WBC
PHOSPHATE SERPL-MCNC: 3.1 MG/DL
PLATELET # BLD AUTO: 124 K/UL
PMV BLD AUTO: 10.3 FL
POCT GLUCOSE: 154 MG/DL (ref 70–110)
POCT GLUCOSE: 178 MG/DL (ref 70–110)
POCT GLUCOSE: 180 MG/DL (ref 70–110)
POCT GLUCOSE: 221 MG/DL (ref 70–110)
POCT GLUCOSE: 225 MG/DL (ref 70–110)
POCT GLUCOSE: 231 MG/DL (ref 70–110)
POCT GLUCOSE: 281 MG/DL (ref 70–110)
POCT GLUCOSE: 295 MG/DL (ref 70–110)
POCT GLUCOSE: 295 MG/DL (ref 70–110)
POCT GLUCOSE: 305 MG/DL (ref 70–110)
POCT GLUCOSE: 322 MG/DL (ref 70–110)
POCT GLUCOSE: 358 MG/DL (ref 70–110)
POTASSIUM SERPL-SCNC: 4.3 MMOL/L
PROT SERPL-MCNC: 4.8 G/DL
RBC # BLD AUTO: 2.78 M/UL
SODIUM SERPL-SCNC: 138 MMOL/L
TACROLIMUS BLD-MCNC: 5.5 NG/ML
WBC # BLD AUTO: 4.19 K/UL

## 2018-12-12 PROCEDURE — 25000003 PHARM REV CODE 250: Performed by: NURSE PRACTITIONER

## 2018-12-12 PROCEDURE — 86870 RBC ANTIBODY IDENTIFICATION: CPT

## 2018-12-12 PROCEDURE — 63600175 PHARM REV CODE 636 W HCPCS: Performed by: PHYSICIAN ASSISTANT

## 2018-12-12 PROCEDURE — 94664 DEMO&/EVAL PT USE INHALER: CPT

## 2018-12-12 PROCEDURE — 63600175 PHARM REV CODE 636 W HCPCS: Performed by: NURSE PRACTITIONER

## 2018-12-12 PROCEDURE — 85025 COMPLETE CBC W/AUTO DIFF WBC: CPT

## 2018-12-12 PROCEDURE — 25000003 PHARM REV CODE 250: Performed by: PHYSICIAN ASSISTANT

## 2018-12-12 PROCEDURE — 80053 COMPREHEN METABOLIC PANEL: CPT

## 2018-12-12 PROCEDURE — 84100 ASSAY OF PHOSPHORUS: CPT

## 2018-12-12 PROCEDURE — 99900035 HC TECH TIME PER 15 MIN (STAT)

## 2018-12-12 PROCEDURE — 99233 SBSQ HOSP IP/OBS HIGH 50: CPT | Mod: 24,,, | Performed by: PHYSICIAN ASSISTANT

## 2018-12-12 PROCEDURE — 99232 SBSQ HOSP IP/OBS MODERATE 35: CPT | Mod: ,,, | Performed by: NURSE PRACTITIONER

## 2018-12-12 PROCEDURE — 27000646 HC AEROBIKA DEVICE

## 2018-12-12 PROCEDURE — 20600001 HC STEP DOWN PRIVATE ROOM

## 2018-12-12 PROCEDURE — A4217 STERILE WATER/SALINE, 500 ML: HCPCS | Performed by: PHYSICIAN ASSISTANT

## 2018-12-12 PROCEDURE — 80197 ASSAY OF TACROLIMUS: CPT

## 2018-12-12 PROCEDURE — 83735 ASSAY OF MAGNESIUM: CPT

## 2018-12-12 PROCEDURE — 86901 BLOOD TYPING SEROLOGIC RH(D): CPT

## 2018-12-12 PROCEDURE — 25000003 PHARM REV CODE 250: Performed by: TRANSPLANT SURGERY

## 2018-12-12 PROCEDURE — 86922 COMPATIBILITY TEST ANTIGLOB: CPT

## 2018-12-12 RX ADMIN — ATOVAQUONE 1500 MG: 750 SUSPENSION ORAL at 08:12

## 2018-12-12 RX ADMIN — METOPROLOL TARTRATE 25 MG: 25 TABLET ORAL at 08:12

## 2018-12-12 RX ADMIN — VALGANCICLOVIR 450 MG: 450 TABLET, FILM COATED ORAL at 08:12

## 2018-12-12 RX ADMIN — ACETAMINOPHEN 650 MG: 325 TABLET, FILM COATED ORAL at 08:12

## 2018-12-12 RX ADMIN — CALCIUM GLUCONATE: 94 INJECTION, SOLUTION INTRAVENOUS at 08:12

## 2018-12-12 RX ADMIN — ASPIRIN 81 MG CHEWABLE TABLET 81 MG: 81 TABLET CHEWABLE at 08:12

## 2018-12-12 RX ADMIN — MAGNESIUM OXIDE TAB 400 MG (241.3 MG ELEMENTAL MG) 800 MG: 400 (241.3 MG) TAB at 08:12

## 2018-12-12 RX ADMIN — OCTREOTIDE ACETATE 50 MCG: 100 INJECTION, SOLUTION INTRAVENOUS; SUBCUTANEOUS at 02:12

## 2018-12-12 RX ADMIN — HEPARIN SODIUM 5000 UNITS: 5000 INJECTION, SOLUTION INTRAVENOUS; SUBCUTANEOUS at 08:12

## 2018-12-12 RX ADMIN — PANTOPRAZOLE SODIUM 40 MG: 40 TABLET, DELAYED RELEASE ORAL at 08:12

## 2018-12-12 RX ADMIN — TRAZODONE HYDROCHLORIDE 25 MG: 50 TABLET ORAL at 08:12

## 2018-12-12 RX ADMIN — TRAMADOL HYDROCHLORIDE 50 MG: 50 TABLET, FILM COATED ORAL at 05:12

## 2018-12-12 RX ADMIN — TACROLIMUS 1 MG: 1 CAPSULE ORAL at 05:12

## 2018-12-12 RX ADMIN — ACETAMINOPHEN 650 MG: 325 TABLET, FILM COATED ORAL at 02:12

## 2018-12-12 RX ADMIN — ENTECAVIR 0.5 MG: 0.5 TABLET ORAL at 08:12

## 2018-12-12 RX ADMIN — OCTREOTIDE ACETATE 50 MCG: 100 INJECTION, SOLUTION INTRAVENOUS; SUBCUTANEOUS at 05:12

## 2018-12-12 RX ADMIN — OCTREOTIDE ACETATE 50 MCG: 100 INJECTION, SOLUTION INTRAVENOUS; SUBCUTANEOUS at 08:12

## 2018-12-12 RX ADMIN — TACROLIMUS 1 MG: 1 CAPSULE ORAL at 08:12

## 2018-12-12 RX ADMIN — SODIUM CHLORIDE 2.4 UNITS/HR: 9 INJECTION, SOLUTION INTRAVENOUS at 08:12

## 2018-12-12 NOTE — ASSESSMENT & PLAN NOTE
BG goal 140 - 180     Continue IV insulin infusion protocol  Requires intensive q 2 hours BG monitoring while on protocol     Discharge planning: [place]

## 2018-12-12 NOTE — ASSESSMENT & PLAN NOTE
Managed per primary team  Avoid hypoglycemia    Recent Labs   Lab 12/12/18  0600   ALT 22   AST 22   ALKPHOS 188*   BILITOT 1.0   PROT 4.8*   ALBUMIN 2.5*

## 2018-12-12 NOTE — PT/OT/SLP PROGRESS
Physical Therapy      Patient Name:  Scarlett Reddy   MRN:  [id number]    Patient not seen today secondary to Patient unwilling to participate, Pain. Pt found supine in bed with  at bedside. Translation josiah used to assist with communicating to pt and . Max encouragement provided but pt and  reporting pt in increased pain at this time. Pt encouraged to transfer to bedside chair later this date with RN assist. Pt v/u. RN notified. Will follow-up at next scheduled session as able.    Radha Nguyen, PT, DPT   12/12/2018  [phone]

## 2018-12-12 NOTE — PLAN OF CARE
Problem: Patient Care Overview  Goal: Plan of Care Review  Outcome: Ongoing (interventions implemented as appropriate)  Pt AAOx4.  VSS.  No TELE ordered.   at the bedside throughout the day.  Attentive to pt.  Pt sent down to IR this am for thora.  1.2L removed and sent to lab.  Chest tube placed w/ SS drainage noted.  1040 cc output since placement.  Pt c/o severe pain to R side where chest tube was inserted.  PRN tylenol given x2.  Tramadol given once.   Pt refusing any other form of pain medicine at this time.  IV lasix 20 mg given once this shift.    3 unmeasured urine outputs noted.  No BMs.  TPN continued and running @ 50 cc/hr.  Pt still NPO at this time.  Insulin gtt @ 1.6 units/hr.  Blood sugars checked q2h.  Wound vac removed today.  Wound care orders placed.  Change dressing Tues/Fri.  Activity encouraged today however pt not feeling well and in pain.  Pt in bed throughout the day.    Bed lowered and locked in place.  Call bell in reach.  Pt instructed to call for assistance.    No acute events/falls/injuries.  See flowsheet for further assessment findings. Will monitor.

## 2018-12-12 NOTE — ASSESSMENT & PLAN NOTE
- Dietary consulted.   - Temporal and distal extremity muscle wasting and edema.  - Encourage supplements and to increase nutritional intake.  - Per nephrology, pt to be on low protein diet.  - Prealbumin reviewed.   - Tolerating diet.  Denies n/v.    - Attempted low fat, high protein diet due to suspicion for chyle leak with no improvement seen in fluid character.  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 Had advanced diet with return of milky drainage,   - cont on TPN- pt advanced to clear liquid diet  - Monitor.

## 2018-12-12 NOTE — ASSESSMENT & PLAN NOTE
- Common post liver transplant  - CT 11/26 showed: Large right and moderate left pleural effusions with adjacent basilar atelectasis.  - Pt sp02 stable on RA, but c/o some orthopnea.  - CT abd/pelvis 12/3 showed unchanged b/l pleural effusions (large on R, moderate on L) with bibasilar compressive atelectasis and consolidative changes.  - Sp02 stable & no signs of respiratory infection  - repeat echo and chest xray with pleural effusion   - thoracentesis with chest tube placement 12/11/18 - fluid negative for infection  - CXR 12/12 w/ improvements noted.  -chest tube clamped 12/12 after 1510cc output

## 2018-12-12 NOTE — PROGRESS NOTES
SW met with patient and pts  at bedside with Spanish , Dunia peace to assess pts coping, as she has been expressing symptoms of depression, feeling down, crying.    Pt presents sitting up on side of bed, alert and oriented x 4, good eye contact, engaging, quiet when answering and asking questions, in obvious discomfort from chest tube.   Pt reporting to SW feeling that she will never leave the hospital, she has pain, doesn't feel like she is getting better.   Pt reports she doesn't want to give up.   SW discussed coping mechanisms with patient, she enjoys music, classical, so SW discussed with pt having her  play some classical music for her to help her relax.  The community  will be visiting soon, as the holiday commitments he had are now complete.   Patient's  very attentive to patient, helping to ensure she is comfortable.   Pt and pts  deny any other psychosocial concerns at this time.   SW remains available for all transplant resources, education and psychosocial support.   Per LTS rounding team, no dc plans for now.

## 2018-12-12 NOTE — PLAN OF CARE
Problem: Patient Care Overview  Goal: Plan of Care Review  Outcome: Ongoing (interventions implemented as appropriate)  CT placed yesterday. Since placement pt has put out > 2000 ml from CT.   CT currently to - 20 cm WS. BP 80s-110 systolic. Metoprolol held. Discussed with ANIKA Shaikh, NP - clamp CT once output in unit reaches 1500 ml. CXR this am.   Pt with severe pain to CT site but refuses pain meds except for tylenol.   Remains on TPN and insulin gtt.   BG range 280-310. Insulin needs adjusted per algorithm.   NPO. Drainage from grenade remains milky.   Fall precautions maintained. Bed wheels locked, bed in lowest position, upper SR up x 2, call light in reach, non-skid socks when OOB. Instructed pt to call for assistance as needed. Will cont to monitor.

## 2018-12-12 NOTE — PROGRESS NOTES
"[facility]  Endocrinology  Progress Note    Admit Date: 10/1/2018     Reason for Consult: Management of type 2 DM, Hyperglycemia      Surgical Procedure and Date: Liver transplant 10/5/18, Ex lap 10/6/18     Diabetes diagnosis year:      Home Diabetes Medications:  Lantus 18 units HS and novolog 17 units with meals plus correction scale (150-200 +1)        Lab Results   Component Value Date     HGBA1C 6.8 (H) 2018        How often checking glucose at home? 3-4   BG readings on regimen: 120-150.  Post prandial readings as high as upper 200s  Hypoglycemia on the regimen? yes, none recently   Missed doses on regimen?  No     Diabetes Complications include:     Diabetic peripheral neuropathy      Complicating diabetes co morbidities:   CIRRHOSIS        HPI:  Patient is a 68 y.o. female with a diagnosis of ESLD, listed for liver transplant with meld 23 who underwent live transplant on 10/5/18.  Back to OR on 10/6/18 for ex lap.  Admitted 10/1/18 for hyponatremia s/p paracentesis.  Personal has known diagnosis of diabetes, endocrine consulted for diabetes management.    Post-operative course complicated by ESBL Klebsiella pneumoniae in urine (in contact isolation), peritransplant ascites, worsening renal function (required temporary dialysis), anemia (multiple blood transfusions), and possible GI bleed.     Interval HPI:   Overnight events: Remains in TSU. NAEON. Patient's pain is improving.BG is within goal on current Intensive Infusion Protocol at rates ranging from 1.25-3.8 units/hr.    Eatin% clear chicken broth only PRN per primary care team.   Nausea: No  Hypoglycemia and intervention: No  Fever: No  TPN and/or TF: Yes  If yes, type of TF/TPN and rate: 41 cc/hr.    /75   Pulse 96   Temp 97.9 °F (36.6 °C) (Oral)   Resp 14   Ht 5' 3" (1.6 m)   Wt 54 kg (119 lb 0.8 oz)   LMP  (LMP Unknown)   SpO2 96%   Breastfeeding? No   BMI 21.09 kg/m²      Labs Reviewed and Include  "   Recent Labs   Lab 12/12/18  0600   *   CALCIUM 8.5*   ALBUMIN 2.5*   PROT 4.8*      K 4.3   CO2 25      BUN 46*   CREATININE 1.0   ALKPHOS 188*   ALT 22   AST 22   BILITOT 1.0     Lab Results   Component Value Date    WBC 4.19 12/12/2018    HGB 8.1 (L) 12/12/2018    HCT 25.8 (L) 12/12/2018    MCV 93 12/12/2018     (L) 12/12/2018     No results for input(s): TSH, FREET4 in the last 168 hours.  Lab Results   Component Value Date    HGBA1C 6.0 (H) 10/04/2018       Nutritional status:   Body mass index is 21.09 kg/m².  Lab Results   Component Value Date    ALBUMIN 2.5 (L) 12/12/2018    ALBUMIN 2.9 (L) 12/11/2018    ALBUMIN 2.9 (L) 12/10/2018     Lab Results   Component Value Date    PREALBUMIN 13 (L) 12/10/2018    PREALBUMIN 15 (L) 11/03/2018    PREALBUMIN 12 (L) 09/17/2018       Estimated Creatinine Clearance: 43.9 mL/min (based on SCr of 1 mg/dL).    Accu-Checks  Recent Labs     12/11/18  1520 12/11/18  1724 12/11/18  1932 12/11/18  2135 12/11/18  2321 12/12/18  0133 12/12/18  0337 12/12/18  0537 12/12/18  0735 12/12/18  0933   POCTGLUCOSE 221* 222* 310* 280* 295* 305* 221* 231* 178* 180*       Current Medications and/or Treatments Impacting Glycemic Control  Immunotherapy:    Immunosuppressants         Stop Route Frequency     tacrolimus capsule 1 mg      -- Oral 2 times daily        Steroids:   Hormones (From admission, onward)    Start     Stop Route Frequency Ordered    12/11/18 2200  octreotide injection 50 mcg      -- IV Every 8 hours 12/11/18 1531    10/17/18 0945  predniSONE tablet 15 mg      10/24 0859 Oral Daily 10/17/18 0943        Pressors:    Autonomic Drugs (From admission, onward)    Start     Stop Route Frequency Ordered    10/06/18 1617  rocuronium (ZEMURON) 10 mg/mL injection     Comments:  Created by cabinet override    10/07 0429   10/06/18 1617        Hyperglycemia/Diabetes Medications:   Antihyperglycemics (From admission, onward)    Start     Stop Route Frequency  Ordered    12/10/18 1730  insulin regular (Humulin R) 100 Units in sodium chloride 0.9% 100 mL infusion     Question:  Insulin Rate Adjustment (DO NOT MODIFY ANSWER)  Answer:  [email or web address]    -- IV Continuous 12/10/18 1626    12/09/18 1554  insulin regular injection 4 Units      -- IV Once 12/09/18 1554    10/07/18 1200  insulin regular (Humulin R) 100 Units in sodium chloride 0.9% 100 mL infusion      10/13 2100 IV Continuous 10/07/18 1055          ASSESSMENT and PLAN    * Liver transplanted    Managed per primary team  Avoid hypoglycemia    Recent Labs   Lab 12/12/18  0600   ALT 22   AST 22   ALKPHOS 188*   BILITOT 1.0   PROT 4.8*   ALBUMIN 2.5*            Type 2 diabetes mellitus with diabetic polyneuropathy, with long-term current use of insulin    BG goal 140 - 180     Slightly above goal secondary to TPN.  Continue IV insulin infusion protocol. Will transition when diet is advanced.  Requires intensive q 2 hours BG monitoring while on protocol     Discharge planning: TBD         Prophylactic immunotherapy    May increase insulin resistance.          Long-term use of immunosuppressant medication     may elevate BG readings       Alteration in skin integrity      Optimize BG control to improve wound healing       Severe malnutrition    On TPN. May elevate BG     Delayed surgical wound healing    Optimize BG control to improve wound healing           On Total Parental Nutrition    On TPN. May elevate BG         Louie Merlos NP  Endocrinology  [facility]

## 2018-12-12 NOTE — ASSESSMENT & PLAN NOTE
- Paracentesis 10/1, 5L removed, no fluid sent for analysis.  - Pt remains volume overloaded.   - Liver US 11/6 with severe ascites.   - Paracentesis 11/7 with 8.9 L removed. Wbc 39, segs 16%. Gram stain - no organisms seen and no WBC. Cultures NGTD.  - Paracentesis 11/12: 8.6 L removed. 34 wbc, 25% segs, 33 lymphs, 42 monocytes/macrophages. Cultures NGTD  - Paracentesis 11/21 with 4 L removed, neg for infection.  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 Milky appearance, cell count of fluid (51% lymphs), triglycerides 2208, consistent with chyle leak.   - Peritoneal fluid 11/29 growing enterococcus and will plan for treatment with Vanc x 10 days per ID recs.   - Attempted low fat, high protein diet due to suspicion for chyle leak with no improvement seen in fluid character.  - CT abd/pelvis 12/3 shows small volume abdominal ascites, improved compared to prior, pelvis ascites unchanged from prior, unchanged b/l pleural effusions (large on R, moderate on L) with bibasilar compressive atelectasis and consolidative changes.  - Placed PICC line and started TPN + octreotide 12/3. Improvement in color of fluid seen 12/6 - 12/7 (less milky, more abbie). Will continue with TPN + adv diet to low fat at this time.  - Continue to monitor.  -lasix 40 mg iv x 1 12/8/18  - diamox 500mg iv x 1 12/9/18  - lasix 40 mg iv x 1 12/10/18  Lasix 20 mg iv x 1 12/11, 12/12

## 2018-12-12 NOTE — PROGRESS NOTES
[facility]  Liver Transplant  Progress Note    Patient Name: Scarlett Reddy  MRN: [id number]  Admission Date: 10/1/2018  Hospital Length of Stay: 71 days  Code Status: Full Code  Primary Care Provider: Primary Doctor No  Post-Operative Day: 68    ORGAN:   LIVER  Disease Etiology: Cirrhosis: Type B and D  Donor Type:    - Brain Death  CDC High Risk:   No  Donor CMV Status:   Donor CMV Status: Positive  Donor HBcAB:   Negative  Donor HCV Status:   Negative  Donor HBV SETH: Negative  Donor HCV SETH: Negative  Whole or Partial: Whole Liver  Biliary Anastomosis: End to End  Arterial Anatomy: Replaced Left Hepatic and Right Hepatic  Subjective:     History of Present Illness:  Ms. Reddy is a 67 y/o female with PMH of ESLD secondary Hep B and D.  Listed for liver transplant with MELD 23.  Paracentesis 10/1 with 5L removed, no fluid sent for analysis.  Morning labs significant for hyponatremia, Na 119.  Pt admitted to LTS for sodium monitoring.        Hospital Course:  Hospital Course: 67 y/o F h/o HBV/delta cirrhosis s/p DBDLT 10/5/18 (CMV D+/R+, steroid induction, MMF/tacro/pred maintenance) with take back on 10/6 2/2 hyperbilirubinema and bilious VALORIE drainage, no leak identified. Post-op course c/b hypercapneic and hypoxic respiratory failure and was reintubated though quickly extubated now doing well. Liver bx (10/6) sig for Zone 3 hemorrhage and collapse, in addition to cholestasis. Transferred from ICU on POD #4. ID consulted to comment on positive diphtheroid culture from ascitic fluid (likely skin colonization) as well as ESBL Klebsiella pneumoniae in urine culture (10/4).  Pt asymptomatic and cell count from ascitic fluid unremarkable. Per ID recs, no need to treat diphtheroids or asymptomatic ESBL Kleb pneumo bacteriuria though pt has had 6 days of therapy which would cover these organisms. Mild peritransplant ascites- repeat US 10/6 with increased arterial resistive indices. Repeat US 10/9  w/ continued elevation in RIs and fluid collections. LFTs trending down nicely.     Acute gradual worsening of renal function s/p OLTx. Creatinine on arrival 0.8 and has been up trending. Nephrology consulted for post-op EVA. Cr cont to trend up but remains to have good UOP. Had stable response to lasiz diuresis.  BUN elevated requiring multiple rounds of dialysis (10/15, 10/17, 10/20, 10/31). No significant improvements in kidney function noted.   In addition, pt H/H cont to fluctuate requiring multiple blood transfusions (10/14, 10/16, 10/18, 10/19, 10/22, 10/27, 10/29).  Pt reported dark stools 10/15 but color has normalized. FOBT+. EGD 10/18 consistent with ulcerated mucosa in duodenum s/p coagulation. Concern for H. Pylori. Started Amoxicillin/levofloxacin (10/19, complete 11/2)). Remains on Protonix 40 mg BID. Biopsy negative for infection and CMV. Will cont close monitoring H/H, transfuse as needed with goal Hb > 7.0. Of note, Urine cx + Klebsiella pneumoniae (10/13). Received 3 day course of ciprofloxacin, dc'd 10/19.   On 10/19 pm pt c/o crushing chest pain. Troponin 0.094, likely 2/2 ischemic stress. EKG NSR.   Liver US 11/6 showed no arterial flow. Paracentesis 11/7 with 8.9 L removed. Wbc 39, segs 16%. Gram stain no organisms seen + no WBC. Pt feels better since having fluid removed but she continues to have worsening abdominal distention & LE edema.Repeat Liver US 11/8 showed arterial flow with increased RIs. Paracentesis 11/12 with 8.6L removed (WBC 34, segs 25%). 11/15 liver u/s showed abnormally decreased main hepatic artery peak systolic velocity with slow arterial flow and abnormal tardus parvus waveforms intrahepatically. Consulted vascular surgery. Repeat liver US 11/19 showed no arterial flow on R and sluggish flow on L. Hepatic angiogram 11/19 confirmed HAT.  Friday 11/16, purulent drainage noted from middle of chevron incision. Area opened and packed. Cx grew klebsiella p. CT scan showed no  signs of infection. On Monday 11/18, it was noted that the right/lateral side of the chevron had 3 small openings draining purulent fluid. Opened the area and packed. Vac placed on 11/24. Wound care following. CT abd/pelvis 11/3 showed small volume abdominal ascites, improved compared to prior, pelvis ascites unchanged from prior, unchanged b/l pleural effusions (large on R, moderate on L) with bibasilar compressive atelectasis and consolidative changes. Patient finished day 7 of 7 of Ertapenem for kleb pneumo ESBL UTI 11/29. Of note, pt grew same bacteria from wound 11/16. Liver US to assess recanalization post HAT 12/4 -> conclusion that pt may recanalize. Will monitor closely with no further intervention planned at this time.    Interval history: No acute events overnight. TPN + octreotide started 12/3. Peritoneal fluid (perc drain) continues to be yellow milky in nature, but starting to thin out.  Peritoneal fluid 11/29 w/ enterococcus. Per ID, treating with Vanc x 10 days and then repeat peritoneal fluid cx. Chest xray and echo 12/9 with pleural effusions. Right thoracentesis with chest tube placement done 12/11 with 1.2L removed. Patient in pain this morning but refuses pain medication aside from Tylenol. Chest tube off suction today and clamped after output of 1510cc.  Pleural fluid w/ 120 WBCs, 8% segs. Cultures pending.  Continue to diurese with lasix.  Trazodone to aid insomnia.    Scheduled Meds:   aspirin  81 mg Oral Daily    atovaquone  1,500 mg Oral Daily    entecavir  0.5 mg Oral Daily    heparin (porcine)  5,000 Units Subcutaneous Q12H    insulin regular  4 Units Intravenous Once    magnesium oxide  800 mg Oral BID    metoprolol tartrate  25 mg Oral BID    octreotide  50 mcg Intravenous Q8H    pantoprazole  40 mg Oral BID    sodium chloride 0.9%  10 mL Intravenous Q6H    tacrolimus  1 mg Oral BID    traZODone  25 mg Oral QHS    valGANciclovir  450 mg Oral BID     Continuous Infusions:    insulin (HUMAN R) infusion (adults) 1.3 Units/hr (12/12/18 1341)    TPN ADULT CENTRAL LINE CUSTOM 41 mL/hr at 12/11/18 2128    TPN ADULT CENTRAL LINE CUSTOM       PRN Meds:sodium chloride, sodium chloride, sodium chloride, acetaminophen, bisacodyl, calcium carbonate, dextrose 50%, dextrose 50%, omnipaque, ondansetron, ondansetron, polyethylene glycol, Flushing PICC Protocol **AND** sodium chloride 0.9% **AND** sodium chloride 0.9%, sodium chloride 0.9%, traMADol    Review of Systems   Constitutional: Positive for activity change and appetite change.   Respiratory: Positive for shortness of breath.    Cardiovascular: Positive for leg swelling. Negative for chest pain and palpitations.   Gastrointestinal: Positive for abdominal distention and abdominal pain. Negative for vomiting.   Genitourinary: Negative for decreased urine volume and difficulty urinating.   Allergic/Immunologic: Positive for immunocompromised state.   Neurological: Positive for weakness.   Psychiatric/Behavioral: Positive for dysphoric mood and sleep disturbance.     Objective:     Vital Signs (Most Recent):  Temp: 97.9 °F (36.6 °C) (12/12/18 1100)  Pulse: 102 (12/12/18 1200)  Resp: 16 (12/12/18 1200)  BP: 117/63 (12/12/18 1200)  SpO2: 96 % (12/12/18 1200) Vital Signs (24h Range):  Temp:  [97.5 °F (36.4 °C)-98.1 °F (36.7 °C)] 97.9 °F (36.6 °C)  Pulse:  [] 102  Resp:  [13-18] 16  SpO2:  [96 %-98 %] 96 %  BP: (105-137)/(63-79) 117/63     Weight: 54 kg (119 lb 0.8 oz)  Body mass index is 21.09 kg/m².    Intake/Output - Last 3 Shifts       12/10 0700 - 12/11 0659 12/11 0700 - 12/12 0659 12/12 0700 - 12/13 0659    P.O. 455 530 400    I.V. (mL/kg) 40.7 (0.7) 43.2 (0.8) 14.2 (0.3)    Other  0 0     1162.5     Total Intake(mL/kg) 1395.7 (25.4) 1735.7 (32.1) 414.2 (7.7)    Urine (mL/kg/hr) 0 (0) 0 (0) 0 (0)    Emesis/NG output 0      Drains 200 310 200    Other 0 1200     Stool 0 0 0    Blood 0      Chest Tube  1360 150    Total Output 200  2870 350    Net +1195.7 -1134.3 +64.2           Urine Occurrence 256 x 2 x 0 x    Stool Occurrence 1 x 0 x 0 x    Emesis Occurrence 0 x            Physical Exam   Constitutional: She is oriented to person, place, and time.   Hand and temporal muscle wasting   HENT:   Head: Normocephalic.   Eyes: Scleral icterus is present.   Neck: Normal range of motion.   Cardiovascular: Normal rate, regular rhythm, normal heart sounds and intact distal pulses.   Pulmonary/Chest: Effort normal. She has decreased breath sounds in the right lower field and the left lower field.   Abdominal: Soft. Bowel sounds are normal. She exhibits distension and ascites.       Perc drain in place w/ yellow milky drainage  Right sided chest tube in place   Musculoskeletal: Normal range of motion. She exhibits edema.   Neurological: She is alert and oriented to person, place, and time.   Skin: Skin is warm and dry.   Psychiatric: She has a normal mood and affect. Her behavior is normal.   Nursing note and vitals reviewed.      Laboratory:  Immunosuppressants         Stop Route Frequency     tacrolimus capsule 1 mg      -- Oral 2 times daily        CBC:   Recent Labs   Lab 12/12/18  0600   WBC 4.19   RBC 2.78*   HGB 8.1*   HCT 25.8*   *   MCV 93   MCH 29.1   MCHC 31.4*     CMP:   Recent Labs   Lab 12/12/18  0600   *   CALCIUM 8.5*   ALBUMIN 2.5*   PROT 4.8*      K 4.3   CO2 25      BUN 46*   CREATININE 1.0   ALKPHOS 188*   ALT 22   AST 22   BILITOT 1.0     Labs within the past 24 hours have been reviewed.    Diagnostic Results:  I have personally reviewed all pertinent imaging studies.    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   - POD 1 US: increased arterial resistive indices, suggestive of edema vs acute rejection vs congestion.  - Repeat US 10/9  "with continued elevation of RIs.  - LFTs stable.  - T bili trending down.   - Repeat liver US (10/11): elevated RIs.  - Liver US 11/6 to assess for ascites. No arterial flow within the liver allograft concerning for interval thrombosis/occlusion of the arterial system. Severe ascites seen.  - Liver US 11/8: arterial flow seen w/ elevated RIs   - LFTs remain stable. No CTA at this time due to EVA and normal liver function.   - Liver US 11/15 showed "abnormally decreased main hepatic artery peak systolic velocity with slow arterial flow and abnormal tardus parvus waveforms intrahepatically."   -Vascular surgery consulted  - Liver US 11/19 showed no arterial flow on the right and sluggish flow on the left.   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 No further intervention planned at this time as patient's LFTs are stable. Will monitor.  - CT abd/pelvis 11/26 showed: 1. Persistent large volume of ascites. 2. Large right and moderate left pleural effusions with adjacent basilar atelectasis. 3. Anasarca  - IR placed perc drain 11/29 due to suspicion for chyle leak. 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  - Fluid also growing enterococcus and will plan for treatment with Vanc x 10 days per ID recs, completed.      Sleeps in sitting position due to orthopnea    - obtain chest xray and echo  - diamox 500mg iv x 1 12/9/18  - lasix 40 mg iv x 1 12/10/18, another lasix 20 mg iv x 1  -  thoracentesis with chest tube placement 12/11/18       Hypoalbuminemia    - Continue to monitor.     Sequelae of hyperalimentation    - Monitor.       Pleural effusion    - Common post liver transplant  - CT 11/26 showed: Large right and moderate left pleural effusions with adjacent basilar atelectasis.  - Pt sp02 stable on RA, but c/o some orthopnea.  - CT abd/pelvis 12/3 showed unchanged b/l pleural effusions (large on R, moderate on L) with bibasilar compressive " "atelectasis and consolidative changes.  - Sp02 stable & no signs of respiratory infection  - repeat echo and chest xray with pleural effusion   - thoracentesis with chest tube placement 12/11/18 - fluid negative for infection  - CXR 12/12 w/ improvements noted.  -chest tube clamped 12/12 after 1510cc output     Hypomagnesemia    - Continue PO Mag Oxide  - Continue to monitor.       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       Delayed surgical wound healing    - Middle chevron incision opened 11/16 with purulent drainage  - Packed with Aquacel ag at this time. Growing Klebsiella p.   - CT abd/pelvis 11/17- no signs of infection  - 11/17: R side of chevron incision w/ 3 small holes draining purulent fluid, opened and packed w/ aquacel.   - Wound care following.  - Wound w/ increased drainage, so wound care placed vac 11/24/18.  Apprec recs. Extending Ertapenem thru 11/28 per ID.    - Abdomen erythematous and indurated RLQ inferior to chevron.  - CT abd/pelvis 11/26 - no sign of infection  - Last wound vac change 12/7 with improvements noted in wound - depth now 0.5cm, so no longer needs vac per wound care     Hepatic artery thrombosis of transplanted liver    - Liver US 11/19 showed no arterial flow to R and sluggish flow to L  - Hepatic angiogram 11/19 confirmed HAT  - Liver US 12/4 to evaluate for recanalization reviewed. Recanalization possible. Will continue to monitor.  - No further intervention planned at this time. Will continue to monitor.     Drug-induced pancytopenia    - Decreased plt, wbc, h/h  - See "leukopenia"  - Continue to monitor.     Thrombocytopenia, unspecified    - Monitor.       Leukopenia    - Stopped Cellcept, Bactrim, and Valcyte 10/31   - Cellcept half dose resumed 11/6  - Started Atovaquone 11/6.   - CMV PCR noted to be positive at 129.Helld off on treatment with valcyte, however " weekly CMV PCRs increasing, start treatment dose 12/9/18  - Cellcept held 11/23 2/2 infection (UTI + wound)  - Continue to monitor.       At risk for opportunistic infections    - Valcyte for CMV prophylaxis--> holding (10/31) for leukopenia. Pt will need weekly CMV PCR while discontinued.  - CMV PCR noted to be positive at 129. .  - CMV PCR 11/8: 105  - CMV PCR 11/15 with 89 copies  - CMV PCR 11/23 95 - increased from previous. Continue to hold Valcyte & monitor pt closely.  - CMV PCR 11/29 146  - CMV PCR 12/6 529 - start treatment dose valcyte 12/9/18  - Bactrim for PCP prophylaxis (MWF).--> holding (10/31) for leukopenia.   - Start Atovaquone 11/6.  - Fluconazole for fungal prophylaxis (first dose 10/6). D/c Fluc 11/16 bc pt had been on it for > 30 days post take back.       Long-term use of immunosuppressant medication    - Prograf: stopped 10/29. Resumed 11/2.  --> Will monitor for signs of toxic side effects, check daily troughs, and change meds accordingly.   - Prednisone: stopped 10/29. Started on Hydrocortisone. pred taper resumed.  - Cellcept: stopped 10/31 for leukopenia. Restarted Cellcept 500 mg bid, 11/6.  - Cellcept held 11/23 2/2 infection         Prophylactic immunotherapy    - See long term use immunosuppresion.       Chylous ascites    - Paracentesis 10/1, 5L removed, no fluid sent for analysis.  - Pt remains volume overloaded.   - Liver US 11/6 with severe ascites.   - Paracentesis 11/7 with 8.9 L removed. Wbc 39, segs 16%. Gram stain - no organisms seen and no WBC. Cultures NGTD.  - Paracentesis 11/12: 8.6 L removed. 34 wbc, 25% segs, 33 lymphs, 42 monocytes/macrophages. Cultures NGTD  - Paracentesis 11/21 with 4 L removed, neg for infection.  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 Milky appearance, cell count of fluid (51% lymphs), triglycerides 2208, consistent with chyle leak.   - Peritoneal fluid 11/29 growing enterococcus and will plan for treatment with Vanc x  10 days per ID recs.   - Attempted low fat, high protein diet due to suspicion for chyle leak with no improvement seen in fluid character.  - CT abd/pelvis 12/3 shows small volume abdominal ascites, improved compared to prior, pelvis ascites unchanged from prior, unchanged b/l pleural effusions (large on R, moderate on L) with bibasilar compressive atelectasis and consolidative changes.  - Placed PICC line and started TPN + octreotide 12/3. Improvement in color of fluid seen 12/6 - 12/7 (less milky, more abbie). Will continue with TPN + adv diet to low fat at this time.  - Continue to monitor.  -lasix 40 mg iv x 1 12/8/18  - diamox 500mg iv x 1 12/9/18  - lasix 40 mg iv x 1 12/10/18  Lasix 20 mg iv x 1 12/11, 12/12     Physical deconditioning    - PT/OT consulted.  Progressing well w/ therapy.  - Recommend home health PT and rolling walker at discharge (orders placed 11/6).       Anemia requiring transfusions    - Fluctuating H&H.    - Transfusions: 10/14; 10/16; 10/18; 10/19; 10/22; 10/27; 10/29, 10/31, 11/3, 11/21, 11/29, 12/4  - FOBT +  - LDH elevated, Hapto < 10.  - EGD 10/18: ulcerated duodenal mucosa.   - Consulted GI about this ongoing issue and further need of another EGD.  - EGD 11/5 unremarkable.   - Iron studies 11/13:  95, TIBC 107, sat iron 89, transferrin 72, ferritin pending  - Hemolytic anemia workup 11/13: Haptoglobin 132, retic 3.9   - H/H dropped 12/4 - transfused 2u PRBC with good response  - No overt signs of bleeding.  - Continue to monitor.           Severe malnutrition    - Dietary consulted.   - Temporal and distal extremity muscle wasting and edema.  - Encourage supplements and to increase nutritional intake.  - Per nephrology, pt to be on low protein diet.  - Prealbumin reviewed.   - Tolerating diet.  Denies n/v.    - Attempted low fat, high protein diet due to suspicion for chyle leak with no improvement seen in fluid character.  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 Had  advanced diet with return of milky drainage,   - cont on TPN- pt advanced to clear liquid diet  - Monitor.         Type 2 diabetes mellitus with diabetic polyneuropathy, with long-term current use of insulin    - Continue home regimen.  - TPN started 12/3 and pt now on insulin drip.  - Endocrine following. Appreciate recs.       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  - Tx w/ Entecavir- dose changed to daily 11/26 due to improved renal fx  - 12/3 Hep B surface antigen negative 12/3, surface AB quant 571, and Hep B DNA <10.  - Continue to monitor, per protocol.         VTE Risk Mitigation (From admission, onward)        Ordered     heparin (porcine) injection 5,000 Units  Every 12 hours      12/06/18 1058     IP VTE HIGH RISK PATIENT  Once      11/05/18 1145     Place sequential compression device  Until discontinued      10/05/18 0709          The patients clinical status was discussed at multidisplinary rounds, involving transplant surgery, transplant medicine, pharmacy, nursing, nutrition, and social work    Discharge Planning:  No Patient Care Coordination Note on file.      Jihan Soares PA-C  Liver Transplant  [facility]

## 2018-12-12 NOTE — SUBJECTIVE & OBJECTIVE
"Interval HPI:   Overnight events: Remains in TSU. NAEON. Patient's pain is improving.BG is within goal on current Intensive Infusion Protocol at rates ranging from 1.25-3.8 units/hr.    Eatin% clear chicken broth only PRN per primary care team.   Nausea: No  Hypoglycemia and intervention: No  Fever: No  TPN and/or TF: Yes  If yes, type of TF/TPN and rate: 41 cc/hr.    /75   Pulse 96   Temp 97.9 °F (36.6 °C) (Oral)   Resp 14   Ht 5' 3" (1.6 m)   Wt 54 kg (119 lb 0.8 oz)   LMP  (LMP Unknown)   SpO2 96%   Breastfeeding? No   BMI 21.09 kg/m²     Labs Reviewed and Include    Recent Labs   Lab 18  0600   *   CALCIUM 8.5*   ALBUMIN 2.5*   PROT 4.8*      K 4.3   CO2 25      BUN 46*   CREATININE 1.0   ALKPHOS 188*   ALT 22   AST 22   BILITOT 1.0     Lab Results   Component Value Date    WBC 4.19 2018    HGB 8.1 (L) 2018    HCT 25.8 (L) 2018    MCV 93 2018     (L) 2018     No results for input(s): TSH, FREET4 in the last 168 hours.  Lab Results   Component Value Date    HGBA1C 6.0 (H) 10/04/2018       Nutritional status:   Body mass index is 21.09 kg/m².  Lab Results   Component Value Date    ALBUMIN 2.5 (L) 2018    ALBUMIN 2.9 (L) 2018    ALBUMIN 2.9 (L) 12/10/2018     Lab Results   Component Value Date    PREALBUMIN 13 (L) 12/10/2018    PREALBUMIN 15 (L) 2018    PREALBUMIN 12 (L) 2018       Estimated Creatinine Clearance: 43.9 mL/min (based on SCr of 1 mg/dL).    Accu-Checks  Recent Labs     18  1520 18  1724 18  1932 18  2135 18  2321 18  0133 18  0337 18  0537 18  0735 18  0933   POCTGLUCOSE 221* 222* 310* 280* 295* 305* 221* 231* 178* 180*       Current Medications and/or Treatments Impacting Glycemic Control  Immunotherapy:    Immunosuppressants         Stop Route Frequency     tacrolimus capsule 1 mg      -- Oral 2 times daily        Steroids:   Hormones " (From admission, onward)    Start     Stop Route Frequency Ordered    12/11/18 2200  octreotide injection 50 mcg      -- IV Every 8 hours 12/11/18 1531    10/17/18 0945  predniSONE tablet 15 mg      10/24 0859 Oral Daily 10/17/18 0943        Pressors:    Autonomic Drugs (From admission, onward)    Start     Stop Route Frequency Ordered    10/06/18 1617  rocuronium (ZEMURON) 10 mg/mL injection     Comments:  Created by cabinet override    10/07 0429   10/06/18 1617        Hyperglycemia/Diabetes Medications:   Antihyperglycemics (From admission, onward)    Start     Stop Route Frequency Ordered    12/10/18 1730  insulin regular (Humulin R) 100 Units in sodium chloride 0.9% 100 mL infusion     Question:  Insulin Rate Adjustment (DO NOT MODIFY ANSWER)  Answer:  [email or web address]    -- IV Continuous 12/10/18 1626    12/09/18 1554  insulin regular injection 4 Units      -- IV Once 12/09/18 1554    10/07/18 1200  insulin regular (Humulin R) 100 Units in sodium chloride 0.9% 100 mL infusion      10/13 2100 IV Continuous 10/07/18 1055

## 2018-12-12 NOTE — ASSESSMENT & PLAN NOTE
- Valcyte for CMV prophylaxis--> holding (10/31) for leukopenia. Pt will need weekly CMV PCR while discontinued.  - CMV PCR noted to be positive at 129. .  - CMV PCR 11/8: 105  - CMV PCR 11/15 with 89 copies  - CMV PCR 11/23 95 - increased from previous. Continue to hold Valcyte & monitor pt closely.  - CMV PCR 11/29 146  - CMV PCR 12/6 529 - start treatment dose valcyte 12/9/18  - Bactrim for PCP prophylaxis ([facility]).--> holding (10/31) for leukopenia.   - Start Atovaquone 11/6.  - Fluconazole for fungal prophylaxis (first dose 10/6). D/c Fluc 11/16 bc pt had been on it for > 30 days post take back.

## 2018-12-12 NOTE — PROGRESS NOTES
Chest tube clamped as per orders.  Pt now with total of 1510 cc output per chest tube since placement.  Orders confirmed with GREGORIO Dubon.    No issues at this time. Will monitor.

## 2018-12-12 NOTE — ASSESSMENT & PLAN NOTE
- obtain chest xray and echo  - diamox 500mg iv x 1 12/9/18  - lasix 40 mg iv x 1 12/10/18, another lasix 20 mg iv x 1  -  thoracentesis with chest tube placement 12/11/18

## 2018-12-12 NOTE — SUBJECTIVE & OBJECTIVE
Scheduled Meds:   aspirin  81 mg Oral Daily    atovaquone  1,500 mg Oral Daily    entecavir  0.5 mg Oral Daily    heparin (porcine)  5,000 Units Subcutaneous Q12H    insulin regular  4 Units Intravenous Once    magnesium oxide  800 mg Oral BID    metoprolol tartrate  25 mg Oral BID    octreotide  50 mcg Intravenous Q8H    pantoprazole  40 mg Oral BID    sodium chloride 0.9%  10 mL Intravenous Q6H    tacrolimus  1 mg Oral BID    traZODone  25 mg Oral QHS    valGANciclovir  450 mg Oral BID     Continuous Infusions:   insulin (HUMAN R) infusion (adults) 1.3 Units/hr (12/12/18 1341)    TPN ADULT CENTRAL LINE CUSTOM 41 mL/hr at 12/11/18 2128    TPN ADULT CENTRAL LINE CUSTOM       PRN Meds:sodium chloride, sodium chloride, sodium chloride, acetaminophen, bisacodyl, calcium carbonate, dextrose 50%, dextrose 50%, omnipaque, ondansetron, ondansetron, polyethylene glycol, Flushing PICC Protocol **AND** sodium chloride 0.9% **AND** sodium chloride 0.9%, sodium chloride 0.9%, traMADol    Review of Systems   Constitutional: Positive for activity change and appetite change.   Respiratory: Positive for shortness of breath.    Cardiovascular: Positive for leg swelling. Negative for chest pain and palpitations.   Gastrointestinal: Positive for abdominal distention and abdominal pain. Negative for vomiting.   Genitourinary: Negative for decreased urine volume and difficulty urinating.   Allergic/Immunologic: Positive for immunocompromised state.   Neurological: Positive for weakness.   Psychiatric/Behavioral: Positive for dysphoric mood and sleep disturbance.     Objective:     Vital Signs (Most Recent):  Temp: 97.9 °F (36.6 °C) (12/12/18 1100)  Pulse: 102 (12/12/18 1200)  Resp: 16 (12/12/18 1200)  BP: 117/63 (12/12/18 1200)  SpO2: 96 % (12/12/18 1200) Vital Signs (24h Range):  Temp:  [97.5 °F (36.4 °C)-98.1 °F (36.7 °C)] 97.9 °F (36.6 °C)  Pulse:  [] 102  Resp:  [13-18] 16  SpO2:  [96 %-98 %] 96 %  BP:  (105-137)/(63-79) 117/63     Weight: 54 kg (119 lb 0.8 oz)  Body mass index is 21.09 kg/m².    Intake/Output - Last 3 Shifts       12/10 0700 - 12/11 0659 12/11 0700 - 12/12 0659 12/12 0700 - 12/13 0659    P.O. 455 530 400    I.V. (mL/kg) 40.7 (0.7) 43.2 (0.8) 14.2 (0.3)    Other  0 0     1162.5     Total Intake(mL/kg) 1395.7 (25.4) 1735.7 (32.1) 414.2 (7.7)    Urine (mL/kg/hr) 0 (0) 0 (0) 0 (0)    Emesis/NG output 0      Drains 200 310 200    Other 0 1200     Stool 0 0 0    Blood 0      Chest Tube  1360 150    Total Output 200 2870 350    Net +1195.7 -1134.3 +64.2           Urine Occurrence 256 x 2 x 0 x    Stool Occurrence 1 x 0 x 0 x    Emesis Occurrence 0 x            Physical Exam   Constitutional: She is oriented to person, place, and time.   Hand and temporal muscle wasting   HENT:   Head: Normocephalic.   Eyes: Scleral icterus is present.   Neck: Normal range of motion.   Cardiovascular: Normal rate, regular rhythm, normal heart sounds and intact distal pulses.   Pulmonary/Chest: Effort normal. She has decreased breath sounds in the right lower field and the left lower field.   Abdominal: Soft. Bowel sounds are normal. She exhibits distension and ascites.       Perc drain in place w/ yellow milky drainage  Right sided chest tube in place   Musculoskeletal: Normal range of motion. She exhibits edema.   Neurological: She is alert and oriented to person, place, and time.   Skin: Skin is warm and dry.   Psychiatric: She has a normal mood and affect. Her behavior is normal.   Nursing note and vitals reviewed.      Laboratory:  Immunosuppressants         Stop Route Frequency     tacrolimus capsule 1 mg      -- Oral 2 times daily        CBC:   Recent Labs   Lab 12/12/18  0600   WBC 4.19   RBC 2.78*   HGB 8.1*   HCT 25.8*   *   MCV 93   MCH 29.1   MCHC 31.4*     CMP:   Recent Labs   Lab 12/12/18  0600   *   CALCIUM 8.5*   ALBUMIN 2.5*   PROT 4.8*      K 4.3   CO2 25      BUN 46*    CREATININE 1.0   ALKPHOS 188*   ALT 22   AST 22   BILITOT 1.0     Labs within the past 24 hours have been reviewed.    Diagnostic Results:  I have personally reviewed all pertinent imaging studies.

## 2018-12-13 LAB
ALBUMIN SERPL BCP-MCNC: 2.4 G/DL
ALP SERPL-CCNC: 192 U/L
ALT SERPL W/O P-5'-P-CCNC: 25 U/L
ANION GAP SERPL CALC-SCNC: 8 MMOL/L
AST SERPL-CCNC: 24 U/L
BASOPHILS # BLD AUTO: 0.02 K/UL
BASOPHILS NFR BLD: 0.6 %
BILIRUB SERPL-MCNC: 1 MG/DL
BLD PROD TYP BPU: NORMAL
BLOOD UNIT EXPIRATION DATE: NORMAL
BLOOD UNIT TYPE CODE: 6200
BLOOD UNIT TYPE: NORMAL
BUN SERPL-MCNC: 44 MG/DL
CALCIUM SERPL-MCNC: 8.2 MG/DL
CHLORIDE SERPL-SCNC: 108 MMOL/L
CO2 SERPL-SCNC: 21 MMOL/L
CODING SYSTEM: NORMAL
CREAT SERPL-MCNC: 1 MG/DL
DIFFERENTIAL METHOD: ABNORMAL
DISPENSE STATUS: NORMAL
EOSINOPHIL # BLD AUTO: 0.1 K/UL
EOSINOPHIL NFR BLD: 2 %
ERYTHROCYTE [DISTWIDTH] IN BLOOD BY AUTOMATED COUNT: 16.4 %
EST. GFR  (AFRICAN AMERICAN): >60 ML/MIN/1.73 M^2
EST. GFR  (NON AFRICAN AMERICAN): 57.6 ML/MIN/1.73 M^2
GLUCOSE SERPL-MCNC: 288 MG/DL
HCT VFR BLD AUTO: 23.6 %
HGB BLD-MCNC: 7.3 G/DL
IMM GRANULOCYTES # BLD AUTO: 0.14 K/UL
IMM GRANULOCYTES NFR BLD AUTO: 3.9 %
LYMPHOCYTES # BLD AUTO: 0.5 K/UL
LYMPHOCYTES NFR BLD: 15.1 %
MAGNESIUM SERPL-MCNC: 1.9 MG/DL
MCH RBC QN AUTO: 29.2 PG
MCHC RBC AUTO-ENTMCNC: 30.9 G/DL
MCV RBC AUTO: 94 FL
MONOCYTES # BLD AUTO: 0.4 K/UL
MONOCYTES NFR BLD: 12.3 %
NEUTROPHILS # BLD AUTO: 2.4 K/UL
NEUTROPHILS NFR BLD: 66.1 %
NRBC BLD-RTO: 0 /100 WBC
PHOSPHATE SERPL-MCNC: 3.3 MG/DL
PLATELET # BLD AUTO: 118 K/UL
PMV BLD AUTO: 10.4 FL
POCT GLUCOSE: 101 MG/DL (ref 70–110)
POCT GLUCOSE: 104 MG/DL (ref 70–110)
POCT GLUCOSE: 136 MG/DL (ref 70–110)
POCT GLUCOSE: 183 MG/DL (ref 70–110)
POCT GLUCOSE: 227 MG/DL (ref 70–110)
POCT GLUCOSE: 229 MG/DL (ref 70–110)
POCT GLUCOSE: 234 MG/DL (ref 70–110)
POCT GLUCOSE: 255 MG/DL (ref 70–110)
POCT GLUCOSE: 257 MG/DL (ref 70–110)
POCT GLUCOSE: 257 MG/DL (ref 70–110)
POCT GLUCOSE: 259 MG/DL (ref 70–110)
POCT GLUCOSE: 265 MG/DL (ref 70–110)
POTASSIUM SERPL-SCNC: 4.4 MMOL/L
PROT SERPL-MCNC: 4.8 G/DL
RBC # BLD AUTO: 2.5 M/UL
SODIUM SERPL-SCNC: 137 MMOL/L
SPECIMEN SOURCE: NORMAL
TACROLIMUS BLD-MCNC: 4.1 NG/ML
TRANS ERYTHROCYTES VOL PATIENT: NORMAL ML
TRIGL FLD-MCNC: 112 MG/DL
WBC # BLD AUTO: 3.57 K/UL

## 2018-12-13 PROCEDURE — 25000003 PHARM REV CODE 250: Performed by: NURSE PRACTITIONER

## 2018-12-13 PROCEDURE — 25000003 PHARM REV CODE 250: Performed by: TRANSPLANT SURGERY

## 2018-12-13 PROCEDURE — 25000003 PHARM REV CODE 250: Performed by: PHYSICIAN ASSISTANT

## 2018-12-13 PROCEDURE — 86902 BLOOD TYPE ANTIGEN DONOR EA: CPT

## 2018-12-13 PROCEDURE — 36430 TRANSFUSION BLD/BLD COMPNT: CPT

## 2018-12-13 PROCEDURE — 80053 COMPREHEN METABOLIC PANEL: CPT

## 2018-12-13 PROCEDURE — 80197 ASSAY OF TACROLIMUS: CPT

## 2018-12-13 PROCEDURE — 25000003 PHARM REV CODE 250: Performed by: STUDENT IN AN ORGANIZED HEALTH CARE EDUCATION/TRAINING PROGRAM

## 2018-12-13 PROCEDURE — 84100 ASSAY OF PHOSPHORUS: CPT

## 2018-12-13 PROCEDURE — 97110 THERAPEUTIC EXERCISES: CPT

## 2018-12-13 PROCEDURE — 94761 N-INVAS EAR/PLS OXIMETRY MLT: CPT

## 2018-12-13 PROCEDURE — 20600001 HC STEP DOWN PRIVATE ROOM

## 2018-12-13 PROCEDURE — 85025 COMPLETE CBC W/AUTO DIFF WBC: CPT

## 2018-12-13 PROCEDURE — 63600175 PHARM REV CODE 636 W HCPCS: Performed by: PHYSICIAN ASSISTANT

## 2018-12-13 PROCEDURE — P9021 RED BLOOD CELLS UNIT: HCPCS

## 2018-12-13 PROCEDURE — 63600175 PHARM REV CODE 636 W HCPCS: Performed by: NURSE PRACTITIONER

## 2018-12-13 PROCEDURE — 99900035 HC TECH TIME PER 15 MIN (STAT)

## 2018-12-13 PROCEDURE — 83735 ASSAY OF MAGNESIUM: CPT

## 2018-12-13 PROCEDURE — 99233 SBSQ HOSP IP/OBS HIGH 50: CPT | Mod: 24,,, | Performed by: PHYSICIAN ASSISTANT

## 2018-12-13 PROCEDURE — 94664 DEMO&/EVAL PT USE INHALER: CPT

## 2018-12-13 PROCEDURE — A4216 STERILE WATER/SALINE, 10 ML: HCPCS | Performed by: TRANSPLANT SURGERY

## 2018-12-13 PROCEDURE — A4217 STERILE WATER/SALINE, 500 ML: HCPCS | Performed by: PHYSICIAN ASSISTANT

## 2018-12-13 RX ORDER — DIPHENHYDRAMINE HCL 25 MG
25 CAPSULE ORAL ONCE
Status: COMPLETED | OUTPATIENT
Start: 2018-12-13 | End: 2018-12-13

## 2018-12-13 RX ORDER — HYDROCODONE BITARTRATE AND ACETAMINOPHEN 500; 5 MG/1; MG/1
TABLET ORAL
Status: DISCONTINUED | OUTPATIENT
Start: 2018-12-13 | End: 2018-12-20

## 2018-12-13 RX ORDER — FUROSEMIDE 10 MG/ML
20 INJECTION INTRAMUSCULAR; INTRAVENOUS ONCE
Status: COMPLETED | OUTPATIENT
Start: 2018-12-13 | End: 2018-12-13

## 2018-12-13 RX ORDER — HYDROXYZINE HYDROCHLORIDE 25 MG/1
25 TABLET, FILM COATED ORAL 3 TIMES DAILY PRN
Status: DISCONTINUED | OUTPATIENT
Start: 2018-12-13 | End: 2018-12-21 | Stop reason: HOSPADM

## 2018-12-13 RX ADMIN — OCTREOTIDE ACETATE 50 MCG: 100 INJECTION, SOLUTION INTRAVENOUS; SUBCUTANEOUS at 08:12

## 2018-12-13 RX ADMIN — PANTOPRAZOLE SODIUM 40 MG: 40 TABLET, DELAYED RELEASE ORAL at 08:12

## 2018-12-13 RX ADMIN — ATOVAQUONE 1500 MG: 750 SUSPENSION ORAL at 08:12

## 2018-12-13 RX ADMIN — METOPROLOL TARTRATE 25 MG: 25 TABLET ORAL at 08:12

## 2018-12-13 RX ADMIN — TRAZODONE HYDROCHLORIDE 25 MG: 50 TABLET ORAL at 08:12

## 2018-12-13 RX ADMIN — MAGNESIUM OXIDE TAB 400 MG (241.3 MG ELEMENTAL MG) 800 MG: 400 (241.3 MG) TAB at 08:12

## 2018-12-13 RX ADMIN — OCTREOTIDE ACETATE 50 MCG: 100 INJECTION, SOLUTION INTRAVENOUS; SUBCUTANEOUS at 02:12

## 2018-12-13 RX ADMIN — DIPHENHYDRAMINE HYDROCHLORIDE 25 MG: 25 CAPSULE ORAL at 04:12

## 2018-12-13 RX ADMIN — TACROLIMUS 1 MG: 1 CAPSULE ORAL at 06:12

## 2018-12-13 RX ADMIN — ENTECAVIR 0.5 MG: 0.5 TABLET ORAL at 08:12

## 2018-12-13 RX ADMIN — ASPIRIN 81 MG CHEWABLE TABLET 81 MG: 81 TABLET CHEWABLE at 08:12

## 2018-12-13 RX ADMIN — VALGANCICLOVIR 450 MG: 450 TABLET, FILM COATED ORAL at 08:12

## 2018-12-13 RX ADMIN — TACROLIMUS 1 MG: 1 CAPSULE ORAL at 08:12

## 2018-12-13 RX ADMIN — FUROSEMIDE 20 MG: 10 INJECTION, SOLUTION INTRAMUSCULAR; INTRAVENOUS at 10:12

## 2018-12-13 RX ADMIN — OCTREOTIDE ACETATE 50 MCG: 100 INJECTION, SOLUTION INTRAVENOUS; SUBCUTANEOUS at 06:12

## 2018-12-13 RX ADMIN — HEPARIN SODIUM 5000 UNITS: 5000 INJECTION, SOLUTION INTRAVENOUS; SUBCUTANEOUS at 09:12

## 2018-12-13 RX ADMIN — Medication 10 ML: at 12:12

## 2018-12-13 RX ADMIN — ACETAMINOPHEN 650 MG: 325 TABLET, FILM COATED ORAL at 09:12

## 2018-12-13 RX ADMIN — CALCIUM GLUCONATE: 94 INJECTION, SOLUTION INTRAVENOUS at 09:12

## 2018-12-13 RX ADMIN — HEPARIN SODIUM 5000 UNITS: 5000 INJECTION, SOLUTION INTRAVENOUS; SUBCUTANEOUS at 08:12

## 2018-12-13 NOTE — SUBJECTIVE & OBJECTIVE
Scheduled Meds:   aspirin  81 mg Oral Daily    atovaquone  1,500 mg Oral Daily    entecavir  0.5 mg Oral Daily    heparin (porcine)  5,000 Units Subcutaneous Q12H    insulin regular  4 Units Intravenous Once    magnesium oxide  800 mg Oral BID    metoprolol tartrate  25 mg Oral BID    octreotide  50 mcg Intravenous Q8H    pantoprazole  40 mg Oral BID    sodium chloride 0.9%  10 mL Intravenous Q6H    tacrolimus  1 mg Oral BID    traZODone  25 mg Oral QHS    valGANciclovir  450 mg Oral BID     Continuous Infusions:   insulin (HUMAN R) infusion (adults) 1.5 Units/hr (12/13/18 1011)    TPN ADULT CENTRAL LINE CUSTOM 41 mL/hr at 12/12/18 2002    TPN ADULT CENTRAL LINE CUSTOM       PRN Meds:sodium chloride, acetaminophen, bisacodyl, calcium carbonate, dextrose 50%, dextrose 50%, ondansetron, ondansetron, polyethylene glycol, Flushing PICC Protocol **AND** sodium chloride 0.9% **AND** sodium chloride 0.9%, sodium chloride 0.9%, traMADol    Review of Systems   Constitutional: Positive for activity change and appetite change. Negative for fever.   Respiratory: Negative for cough and shortness of breath.    Cardiovascular: Positive for leg swelling. Negative for chest pain and palpitations.   Gastrointestinal: Positive for abdominal distention and abdominal pain. Negative for diarrhea, nausea and vomiting.   Genitourinary: Negative for decreased urine volume and difficulty urinating.   Skin: Positive for wound.   Allergic/Immunologic: Positive for immunocompromised state.   Neurological: Positive for weakness.   Psychiatric/Behavioral: Positive for dysphoric mood and sleep disturbance.     Objective:     Vital Signs (Most Recent):  Temp: 97.8 °F (36.6 °C) (12/13/18 1105)  Pulse: 103 (12/13/18 1105)  Resp: 20 (12/13/18 1105)  BP: 122/79 (12/13/18 1105)  SpO2: 97 % (12/13/18 1105) Vital Signs (24h Range):  Temp:  [97.8 °F (36.6 °C)-98.5 °F (36.9 °C)] 97.8 °F (36.6 °C)  Pulse:  [] 103  Resp:  [13-23]  20  SpO2:  [95 %-99 %] 97 %  BP: (115-142)/(62-81) 122/79     Weight: 56.4 kg (124 lb 5.4 oz)  Body mass index is 22.03 kg/m².    Intake/Output - Last 3 Shifts       12/11 0700 - 12/12 0659 12/12 0700 - 12/13 0659 12/13 0700 - 12/14 0659    P.O. 530 1260     I.V. (mL/kg) 43.2 (0.8) 45.7 (0.8)     Other 0 0     TPN 1162.5 408.6     Total Intake(mL/kg) 1735.7 (32.1) 1714.4 (30.4)     Urine (mL/kg/hr) 0 (0) 0 (0)     Emesis/NG output       Drains 310 410     Other 1200      Stool 0 0     Blood       Chest Tube 1360 150     Total Output 2870 560     Net -1134.3 +1154.4            Urine Occurrence 2 x 3 x     Stool Occurrence 0 x 1 x           Physical Exam   Constitutional: She is oriented to person, place, and time.   Hand and temporal muscle wasting   HENT:   Head: Normocephalic.   Eyes: Pupils are equal, round, and reactive to light. Scleral icterus is present.   Neck: Normal range of motion.   Cardiovascular: Normal rate, regular rhythm, normal heart sounds and intact distal pulses.   Pulmonary/Chest: Effort normal. She has decreased breath sounds in the right lower field and the left lower field.   Abdominal: Soft. Bowel sounds are normal. She exhibits distension and ascites.       Perc drain in place w/ yellow drainage  Right sided chest tube in place   Musculoskeletal: Normal range of motion. She exhibits edema.   Neurological: She is alert and oriented to person, place, and time.   Skin: Skin is warm and dry.   Psychiatric: She has a normal mood and affect. Her behavior is normal.   Nursing note and vitals reviewed.      Laboratory:  Immunosuppressants         Stop Route Frequency     tacrolimus capsule 1 mg      -- Oral 2 times daily        CBC:   Recent Labs   Lab 12/13/18  0600   WBC 3.57*   RBC 2.50*   HGB 7.3*   HCT 23.6*   *   MCV 94   MCH 29.2   MCHC 30.9*     CMP:   Recent Labs   Lab 12/13/18  0600   *   CALCIUM 8.2*   ALBUMIN 2.4*   PROT 4.8*      K 4.4   CO2 21*      BUN 44*    CREATININE 1.0   ALKPHOS 192*   ALT 25   AST 24   BILITOT 1.0     Labs within the past 24 hours have been reviewed.    Diagnostic Results:  I have personally reviewed all pertinent imaging studies.

## 2018-12-13 NOTE — PROGRESS NOTES
[facility]  Liver Transplant  Progress Note    Patient Name: Scarlett Reddy  MRN: [id number]  Admission Date: 10/1/2018  Hospital Length of Stay: 72 days  Code Status: Full Code   Primary Care Provider: Primary Doctor No  Post-Operative Day: 69    ORGAN:   LIVER  Disease Etiology: Cirrhosis: Type B and D  Donor Type:    - Brain Death  CDC High Risk:   No  Donor CMV Status:   Donor CMV Status: Positive  Donor HBcAB:   Negative  Donor HCV Status:   Negative  Donor HBV SETH: Negative  Donor HCV SETH: Negative  Whole or Partial: Whole Liver  Biliary Anastomosis: End to End  Arterial Anatomy: Replaced Left Hepatic and Right Hepatic  Subjective:     History of Present Illness:  Ms. Reddy is a 69 y/o female with PMH of ESLD secondary Hep B and D.  Listed for liver transplant with MELD 23.  Paracentesis 10/1 with 5L removed, no fluid sent for analysis.  Morning labs significant for hyponatremia, Na 119.  Pt admitted to LTS for sodium monitoring.        Hospital Course:  Hospital Course: 69 y/o F h/o HBV/delta cirrhosis s/p DBDLT 10/5/18 (CMV D+/R+, steroid induction, MMF/tacro/pred maintenance) with take back on 10/6 2/2 hyperbilirubinema and bilious VALORIE drainage, no leak identified. Post-op course c/b hypercapneic and hypoxic respiratory failure and was reintubated though quickly extubated now doing well. Liver bx (10/6) sig for Zone 3 hemorrhage and collapse, in addition to cholestasis. Transferred from ICU on POD #4. ID consulted to comment on positive diphtheroid culture from ascitic fluid (likely skin colonization) as well as ESBL Klebsiella pneumoniae in urine culture (10/4).  Pt asymptomatic and cell count from ascitic fluid unremarkable. Per ID recs, no need to treat diphtheroids or asymptomatic ESBL Kleb pneumo bacteriuria though pt has had 6 days of therapy which would cover these organisms. Mild peritransplant ascites- repeat US 10/6 with increased arterial resistive indices. Repeat US 10/9  w/ continued elevation in RIs and fluid collections. LFTs trending down nicely.     Acute gradual worsening of renal function s/p OLTx. Creatinine on arrival 0.8 and has been up trending. Nephrology consulted for post-op EVA. Cr cont to trend up but remains to have good UOP. Had stable response to lasiz diuresis.  BUN elevated requiring multiple rounds of dialysis (10/15, 10/17, 10/20, 10/31). No significant improvements in kidney function noted.   In addition, pt H/H cont to fluctuate requiring multiple blood transfusions (10/14, 10/16, 10/18, 10/19, 10/22, 10/27, 10/29).  Pt reported dark stools 10/15 but color has normalized. FOBT+. EGD 10/18 consistent with ulcerated mucosa in duodenum s/p coagulation. Concern for H. Pylori. Started Amoxicillin/levofloxacin (10/19, complete 11/2)). Remains on Protonix 40 mg BID. Biopsy negative for infection and CMV. Will cont close monitoring H/H, transfuse as needed with goal Hb > 7.0. Of note, Urine cx + Klebsiella pneumoniae (10/13). Received 3 day course of ciprofloxacin, dc'd 10/19.   On 10/19 pm pt c/o crushing chest pain. Troponin 0.094, likely 2/2 ischemic stress. EKG NSR.   Liver US 11/6 showed no arterial flow. Paracentesis 11/7 with 8.9 L removed. Wbc 39, segs 16%. Gram stain no organisms seen + no WBC. Pt feels better since having fluid removed but she continues to have worsening abdominal distention & LE edema.Repeat Liver US 11/8 showed arterial flow with increased RIs. Paracentesis 11/12 with 8.6L removed (WBC 34, segs 25%). 11/15 liver u/s showed abnormally decreased main hepatic artery peak systolic velocity with slow arterial flow and abnormal tardus parvus waveforms intrahepatically. Consulted vascular surgery. Repeat liver US 11/19 showed no arterial flow on R and sluggish flow on L. Hepatic angiogram 11/19 confirmed HAT.  Friday 11/16, purulent drainage noted from middle of chevron incision. Area opened and packed. Cx grew klebsiella p. CT scan showed no  signs of infection. On Monday 11/18, it was noted that the right/lateral side of the chevron had 3 small openings draining purulent fluid. Opened the area and packed. Vac placed on 11/24. Wound care following. CT abd/pelvis 11/3 showed small volume abdominal ascites, improved compared to prior, pelvis ascites unchanged from prior, unchanged b/l pleural effusions (large on R, moderate on L) with bibasilar compressive atelectasis and consolidative changes. Patient finished day 7 of 7 of Ertapenem for kleb pneumo ESBL UTI 11/29. Of note, pt grew same bacteria from wound 11/16. Liver US to assess recanalization post HAT 12/4 -> conclusion that pt may recanalize. Will monitor closely with no further intervention planned at this time.    Interval history: No acute events overnight. Remains on TPN, transitioned to clear liquid diet. Prealbumin 13 on 12/10. Peritoneal fluid (perc drain) continues to be yellow in nature, but starting to thin out.  Peritoneal fluid 11/29 w/ enterococcus. Per ID, treating with Vanc x 10 days and then repeat peritoneal fluid cx. Chest xray and echo 12/9 with pleural effusions. Right thoracentesis with chest tube placement done 12/11. Pleural fluid w/ 120 WBCs, 8% segs. Cultures NGTD. Chest tube removed today, 12/13. CXR w/ no pneumothorax noted. H/H low today- tranfused 1u pRBC. Continue to diurese with lasix.  Trazodone to aid insomnia. CMV PCR 12/13 pending. Cont to monitor.    Scheduled Meds:   aspirin  81 mg Oral Daily    atovaquone  1,500 mg Oral Daily    entecavir  0.5 mg Oral Daily    heparin (porcine)  5,000 Units Subcutaneous Q12H    insulin regular  4 Units Intravenous Once    magnesium oxide  800 mg Oral BID    metoprolol tartrate  25 mg Oral BID    octreotide  50 mcg Intravenous Q8H    pantoprazole  40 mg Oral BID    sodium chloride 0.9%  10 mL Intravenous Q6H    tacrolimus  1 mg Oral BID    traZODone  25 mg Oral QHS    valGANciclovir  450 mg Oral BID     Continuous  Infusions:   insulin (HUMAN R) infusion (adults) 1.5 Units/hr (12/13/18 1011)    TPN ADULT CENTRAL LINE CUSTOM 41 mL/hr at 12/12/18 2002    TPN ADULT CENTRAL LINE CUSTOM       PRN Meds:sodium chloride, acetaminophen, bisacodyl, calcium carbonate, dextrose 50%, dextrose 50%, ondansetron, ondansetron, polyethylene glycol, Flushing PICC Protocol **AND** sodium chloride 0.9% **AND** sodium chloride 0.9%, sodium chloride 0.9%, traMADol    Review of Systems   Constitutional: Positive for activity change and appetite change. Negative for fever.   Respiratory: Negative for cough and shortness of breath.    Cardiovascular: Positive for leg swelling. Negative for chest pain and palpitations.   Gastrointestinal: Positive for abdominal distention and abdominal pain. Negative for diarrhea, nausea and vomiting.   Genitourinary: Negative for decreased urine volume and difficulty urinating.   Skin: Positive for wound.   Allergic/Immunologic: Positive for immunocompromised state.   Neurological: Positive for weakness.   Psychiatric/Behavioral: Positive for dysphoric mood and sleep disturbance.     Objective:     Vital Signs (Most Recent):  Temp: 97.8 °F (36.6 °C) (12/13/18 1105)  Pulse: 103 (12/13/18 1105)  Resp: 20 (12/13/18 1105)  BP: 122/79 (12/13/18 1105)  SpO2: 97 % (12/13/18 1105) Vital Signs (24h Range):  Temp:  [97.8 °F (36.6 °C)-98.5 °F (36.9 °C)] 97.8 °F (36.6 °C)  Pulse:  [] 103  Resp:  [13-23] 20  SpO2:  [95 %-99 %] 97 %  BP: (115-142)/(62-81) 122/79     Weight: 56.4 kg (124 lb 5.4 oz)  Body mass index is 22.03 kg/m².    Intake/Output - Last 3 Shifts       12/11 0700 - 12/12 0659 12/12 0700 - 12/13 0659 12/13 0700 - 12/14 0659    P.O. 530 1260     I.V. (mL/kg) 43.2 (0.8) 45.7 (0.8)     Other 0 0     TPN 1162.5 408.6     Total Intake(mL/kg) 1735.7 (32.1) 1714.4 (30.4)     Urine (mL/kg/hr) 0 (0) 0 (0)     Emesis/NG output       Drains 310 410     Other 1200      Stool 0 0     Blood       Chest Tube 1360 150      Total Output 2870 560     Net -1134.3 +1154.4            Urine Occurrence 2 x 3 x     Stool Occurrence 0 x 1 x           Physical Exam   Constitutional: She is oriented to person, place, and time.   Hand and temporal muscle wasting   HENT:   Head: Normocephalic.   Eyes: Pupils are equal, round, and reactive to light. Scleral icterus is present.   Neck: Normal range of motion.   Cardiovascular: Normal rate, regular rhythm, normal heart sounds and intact distal pulses.   Pulmonary/Chest: Effort normal. She has decreased breath sounds in the right lower field and the left lower field.   Abdominal: Soft. Bowel sounds are normal. She exhibits distension and ascites.       Perc drain in place w/ yellow drainage  Right sided chest tube in place   Musculoskeletal: Normal range of motion. She exhibits edema.   Neurological: She is alert and oriented to person, place, and time.   Skin: Skin is warm and dry.   Psychiatric: She has a normal mood and affect. Her behavior is normal.   Nursing note and vitals reviewed.      Laboratory:  Immunosuppressants         Stop Route Frequency     tacrolimus capsule 1 mg      -- Oral 2 times daily        CBC:   Recent Labs   Lab 12/13/18  0600   WBC 3.57*   RBC 2.50*   HGB 7.3*   HCT 23.6*   *   MCV 94   MCH 29.2   MCHC 30.9*     CMP:   Recent Labs   Lab 12/13/18  0600   *   CALCIUM 8.2*   ALBUMIN 2.4*   PROT 4.8*      K 4.4   CO2 21*      BUN 44*   CREATININE 1.0   ALKPHOS 192*   ALT 25   AST 24   BILITOT 1.0     Labs within the past 24 hours have been reviewed.    Diagnostic Results:  I have personally reviewed all pertinent imaging studies.    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   - POD 1 US: increased arterial resistive indices, suggestive of edema vs acute rejection vs  "congestion.  - Repeat US 10/9 with continued elevation of RIs.  - LFTs stable.  - T bili trending down.   - Repeat liver US (10/11): elevated RIs.  - Liver US 11/6 to assess for ascites. No arterial flow within the liver allograft concerning for interval thrombosis/occlusion of the arterial system. Severe ascites seen.  - Liver US 11/8: arterial flow seen w/ elevated RIs   - LFTs remain stable. No CTA at this time due to EVA and normal liver function.   - Liver US 11/15 showed "abnormally decreased main hepatic artery peak systolic velocity with slow arterial flow and abnormal tardus parvus waveforms intrahepatically."   -Vascular surgery consulted  - Liver US 11/19 showed no arterial flow on the right and sluggish flow on the left.   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 No further intervention planned at this time as patient's LFTs are stable. Will monitor.  - CT abd/pelvis 11/26 showed: 1. Persistent large volume of ascites. 2. Large right and moderate left pleural effusions with adjacent basilar atelectasis. 3. Anasarca  - IR placed perc drain 11/29 due to suspicion for chyle leak. 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 Octreotide dc'd.  - Fluid also growing enterococcus and will plan for treatment with Vanc x 10 days per ID recs, completed.      Sleeps in sitting position due to orthopnea    - obtain chest xray and echo  - diamox 500mg iv x 1 12/9/18  - lasix 40 mg iv x 1 12/10/18, another lasix 20 mg iv x 1  -  thoracentesis with chest tube placement 12/11/18, removed 12/13       Hypoalbuminemia    - Continue to monitor.     Sequelae of hyperalimentation    - Monitor.       Pleural effusion    - Common post liver transplant  - CT 11/26 showed: Large right and moderate left pleural effusions with adjacent basilar atelectasis.  - Pt sp02 stable on RA, but c/o some orthopnea.  - CT abd/pelvis 12/3 showed unchanged b/l pleural " "effusions (large on R, moderate on L) with bibasilar compressive atelectasis and consolidative changes.  - Sp02 stable & no signs of respiratory infection  - repeat echo and chest xray with pleural effusion   - thoracentesis with chest tube placement 12/11/18 - fluid negative for infection  - CXR 12/12 w/ improvements noted.  - chest tube removed 12/13.      Hypomagnesemia    - Continue PO Mag Oxide  - Continue to monitor.       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       Delayed surgical wound healing    - Middle chevron incision opened 11/16 with purulent drainage  - Packed with Aquacel ag at this time. Growing Klebsiella p.   - CT abd/pelvis 11/17- no signs of infection  - 11/17: R side of chevron incision w/ 3 small holes draining purulent fluid, opened and packed w/ aquacel.   - Wound care following.  - Wound w/ increased drainage, so wound care placed vac 11/24/18.  Apprec recs. Extending Ertapenem thru 11/28 per ID.    - Abdomen erythematous and indurated RLQ inferior to chevron.  - CT abd/pelvis 11/26 - no sign of infection  - Last wound vac change 12/7 with improvements noted in wound - depth now 0.5cm, so no longer needs vac per wound care     Hepatic artery thrombosis of transplanted liver    - Liver US 11/19 showed no arterial flow to R and sluggish flow to L  - Hepatic angiogram 11/19 confirmed HAT  - Liver US 12/4 to evaluate for recanalization reviewed. Recanalization possible. Will continue to monitor.  - No further intervention planned at this time. Will continue to monitor.     Drug-induced pancytopenia    - Decreased plt, wbc, h/h  - See "leukopenia"  - Continue to monitor.     Thrombocytopenia, unspecified    - Monitor.       Leukopenia    - Stopped Cellcept, Bactrim, and Valcyte 10/31   - Cellcept half dose resumed 11/6  - Started Atovaquone 11/6.   - CMV PCR noted to be positive at " 129.Held off on treatment with valcyte, however weekly CMV PCRs increasing, start treatment dose 12/9/18  - Cellcept held 11/23 2/2 infection (UTI + wound)  - Continue to monitor.       At risk for opportunistic infections    - Valcyte for CMV prophylaxis--> holding (10/31) for leukopenia. Pt will need weekly CMV PCR while discontinued.  - CMV PCR noted to be positive at 129. .  - CMV PCR 11/8: 105  - CMV PCR 11/15 with 89 copies  - CMV PCR 11/23 95 - increased from previous. Continue to hold Valcyte & monitor pt closely.  - CMV PCR 11/29 146  - CMV PCR 12/6 529 - start treatment dose valcyte 12/9/18  - CMV PCR 12/13 pending    -Bactrim for PCP prophylaxis ([facility]).--> holding (10/31) for leukopenia.   - Start Atovaquone 11/6.  - Fluconazole for fungal prophylaxis (first dose 10/6). D/c Fluc 11/16 bc pt had been on it for > 30 days post take back.       Long-term use of immunosuppressant medication    - Prograf: stopped 10/29. Resumed 11/2.  --> Will monitor for signs of toxic side effects, check daily troughs, and change meds accordingly.   - Prednisone: stopped 10/29. Started on Hydrocortisone. pred taper resumed.  - Cellcept: stopped 10/31 for leukopenia. Restarted Cellcept 500 mg bid, 11/6.  - Cellcept held 11/23 2/2 infection         Prophylactic immunotherapy    - See long term use immunosuppresion.       Other ascites    - Paracentesis 10/1, 5L removed, no fluid sent for analysis.  - Pt remains volume overloaded.   - Liver US 11/6 with severe ascites.   - Paracentesis 11/7 with 8.9 L removed. Wbc 39, segs 16%. Gram stain - no organisms seen and no WBC. Cultures NGTD.  - Paracentesis 11/12: 8.6 L removed. 34 wbc, 25% segs, 33 lymphs, 42 monocytes/macrophages. Cultures NGTD  - Paracentesis 11/21 with 4 L removed, neg for infection.  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 Milky appearance, cell count of fluid (51% lymphs), triglycerides 2208, consistent with chyle leak.   - Peritoneal  fluid 11/29 growing enterococcus and will plan for treatment with Vanc x 10 days per ID recs.   - Attempted low fat, high protein diet due to suspicion for chyle leak with no improvement seen in fluid character.  - CT abd/pelvis 12/3 shows small volume abdominal ascites, improved compared to prior, pelvis ascites unchanged from prior, unchanged b/l pleural effusions (large on R, moderate on L) with bibasilar compressive atelectasis and consolidative changes.  - Placed PICC line and started TPN + octreotide 12/3. Improvement in color of fluid seen 12/6 - 12/7 (less milky, more abbie). Will continue with TPN + adv diet to low fat at this time.  - Continue to monitor.  -lasix 40 mg iv x 1 12/8/18  - diamox 500mg iv x 1 12/9/18  - lasix 40 mg iv x 1 12/10/18  - Lasix 20 mg iv x 1 12/11, 12/12, 12/13     Physical deconditioning    - PT/OT consulted.  Progressing well w/ therapy.  - Recommend home health PT and rolling walker at discharge (orders placed 11/6).       Anemia requiring transfusions    - Fluctuating H&H.    - Transfusions: 10/14; 10/16; 10/18; 10/19; 10/22; 10/27; 10/29, 10/31, 11/3, 11/21, 11/29, 12/4, 12/13  - FOBT +  - LDH elevated, Hapto < 10.  - EGD 10/18: ulcerated duodenal mucosa.   - Consulted GI about this ongoing issue and further need of another EGD.  - EGD 11/5 unremarkable.   - Iron studies 11/13:  95, TIBC 107, sat iron 89, transferrin 72, ferritin pending  - Hemolytic anemia workup 11/13: Haptoglobin 132, retic 3.9   - H/H dropped today, 12/13 - transfused 1u PRBC   - No overt signs of bleeding.  - Continue to monitor.           Severe malnutrition    - Dietary consulted.   - Temporal and distal extremity muscle wasting and edema.  - Encourage supplements and to increase nutritional intake.  - Per nephrology, pt to be on low protein diet.  - Prealbumin reviewed.   - Tolerating diet.  Denies n/v.    - Attempted low fat, high protein diet due to suspicion for chyle leak with no improvement  seen in fluid character.  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 Had advanced diet with return of milky drainage,   - cont on TPN- pt advanced to clear liquid diet 12/12  - Monitor.         Type 2 diabetes mellitus with diabetic polyneuropathy, with long-term current use of insulin    - Continue home regimen.  - TPN started 12/3 and pt now on insulin drip.  - Endocrine following. Appreciate recs.       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  - Tx w/ Entecavir- dose changed to daily 11/26 due to improved renal fx  - 12/3 Hep B surface antigen negative 12/3, surface AB quant 571, and Hep B DNA <10.  - Continue to monitor, per protocol.         VTE Risk Mitigation (From admission, onward)        Ordered     heparin (porcine) injection 5,000 Units  Every 12 hours      12/06/18 1058     IP VTE HIGH RISK PATIENT  Once      11/05/18 1145     Place sequential compression device  Until discontinued      10/05/18 0709          The patients clinical status was discussed at multidisplinary rounds, involving transplant surgery, transplant medicine, pharmacy, nursing, nutrition, and social work    Discharge Planning:  No Patient Care Coordination Note on file.      Jihan Soares PA-C  Liver Transplant  [facility]

## 2018-12-13 NOTE — ASSESSMENT & PLAN NOTE
- Stopped Cellcept, Bactrim, and Valcyte 10/31   - Cellcept half dose resumed 11/6  - Started Atovaquone 11/6.   - CMV PCR noted to be positive at 129.Held off on treatment with valcyte, however weekly CMV PCRs increasing, start treatment dose 12/9/18  - Cellcept held 11/23 2/2 infection (UTI + wound)  - Continue to monitor.

## 2018-12-13 NOTE — ASSESSMENT & PLAN NOTE
- Common post liver transplant  - CT 11/26 showed: Large right and moderate left pleural effusions with adjacent basilar atelectasis.  - Pt sp02 stable on RA, but c/o some orthopnea.  - CT abd/pelvis 12/3 showed unchanged b/l pleural effusions (large on R, moderate on L) with bibasilar compressive atelectasis and consolidative changes.  - Sp02 stable & no signs of respiratory infection  - repeat echo and chest xray with pleural effusion   - thoracentesis with chest tube placement 12/11/18 - fluid negative for infection  - CXR 12/12 w/ improvements noted.  - chest tube removed 12/13.

## 2018-12-13 NOTE — PLAN OF CARE
Pt AAOx4.  VSS.  No TELE ordered.   at the bedside throughout the day.  Attentive to pt.  Chest tube removed this am without issues.  Chest xray obtained - no pneumo noted, still with bilateral pleural effusions.    AM labs monitored.  H&H low this am.  1 unit PRBCs given without any issues.    IV lasix 20 mg given once this shift.    4 unmeasured urine outputs noted.  2 BMs.  TPN continued and running @ 41 cc/hr.  Pt now on clear liquid diet - no sugar.    Insulin gtt fluctuating today based on blood sugars checked q2h.  Now running @ 1.5 units/hr.   Abdomen distended.  VALORIE drain emptied q4h.  Still with large amount of output.  Color and consistency clearing up.  Leaking noted from insertion site.  PA notified.    Old chevron with wound care orders to be changed Tuesday/friday.    Activity encouraged.  Pt feeling better today, sitting up in the chair for half of the day.  Pt seen up walking in the hallway with PT/OT.  Bed lowered and locked in place. Chair locked in place. Call bell in reach.  Pt instructed to call for assistance.  Non-skid socks on feet.  No acute events/falls/injuries.  See flowsheet for further assessment findings. Will monitor.

## 2018-12-13 NOTE — ASSESSMENT & PLAN NOTE
BG goal 140 - 180     Continue IV insulin infusion protocol  Requires intensive q 2 hours BG monitoring while on protocol     ADDENDUM: Diet advanced to full liquids, convert to transition IV insulin infusion at 2.1 u/hr, add Novolog 2-4 units with meals, low dose correction, BG monitoring /HS/0200    Discharge planning: TBD

## 2018-12-13 NOTE — ASSESSMENT & PLAN NOTE
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   - POD 1 US: increased arterial resistive indices, suggestive of edema vs acute rejection vs congestion.  - Repeat US 10/9 with continued elevation of RIs.  - LFTs stable.  - T bili trending down.   - Repeat liver US (10/11): elevated RIs.  - Liver US 11/6 to assess for ascites. No arterial flow within the liver allograft concerning for interval thrombosis/occlusion of the arterial system. Severe ascites seen.  - Liver US 11/8: arterial flow seen w/ elevated RIs   - LFTs remain stable. No CTA at this time due to EVA and normal liver function.   - Liver US 11/15 showed "abnormally decreased main hepatic artery peak systolic velocity with slow arterial flow and abnormal tardus parvus waveforms intrahepatically."   -Vascular surgery consulted  - Liver US 11/19 showed no arterial flow on the right and sluggish flow on the left.   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 No further intervention planned at this time as patient's LFTs are stable. Will monitor.  - CT abd/pelvis 11/26 showed: 1. Persistent large volume of ascites. 2. Large right and moderate left pleural effusions with adjacent basilar atelectasis. 3. Anasarca  - IR placed perc drain 11/29 due to suspicion for chyle leak. 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 Octreotide dc'd.  - Fluid also growing enterococcus and will plan for treatment with Vanc x 10 days per ID recs, completed.   "

## 2018-12-13 NOTE — PLAN OF CARE
Problem: Patient Care Overview  Goal: Plan of Care Review  Outcome: Ongoing (interventions implemented as appropriate)  CT placed 12/11. CT currently clamped.   Metoprolol held. CXR this am.   Pt reports pain from CT site much improved this shift.   Remains on TPN and insulin gtt.   BG range 136-322. Insulin needs adjusted per algorithm.   Clear liquid diet. Drainage from grenade remains milky.   Fall precautions maintained. Bed wheels locked, bed in lowest position, upper SR up x 2, call light in reach, non-skid socks when OOB. Instructed pt to call for assistance as needed. Will cont to monitor.

## 2018-12-13 NOTE — ASSESSMENT & PLAN NOTE
- Dietary consulted.   - Temporal and distal extremity muscle wasting and edema.  - Encourage supplements and to increase nutritional intake.  - Per nephrology, pt to be on low protein diet.  - Prealbumin reviewed.   - Tolerating diet.  Denies n/v.    - Attempted low fat, high protein diet due to suspicion for chyle leak with no improvement seen in fluid character.  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 Had advanced diet with return of milky drainage,   - cont on TPN- pt advanced to clear liquid diet 12/12  - Monitor.

## 2018-12-13 NOTE — PLAN OF CARE
Problem: Occupational Therapy Goal  Goal: Occupational Therapy Goal  Goals to be met by: 12/22/18    Patient will increase functional independence with ADLs by performing:    UE Dressing with Supervision.  LE Dressing with Supervision.  Grooming while standing at sink with Supervision.   Toileting from toilet with Supervision for hygiene and clothing management.   Toilet transfer to toilet with Supervision.         Outcome: Ongoing (interventions implemented as appropriate)  Continue with POC.   Laura corado OT  12/13/2018

## 2018-12-13 NOTE — SUBJECTIVE & OBJECTIVE
"Interval HPI:   Overnight events: Remains in TSU, NAEON.  BG trending down overnight on intensive insulin protocol with BG monitoring q 2 hours, rates ranging from 1.5-3.2 u/hr.  Eating:   <25% - sugar free clears  Nausea: No  Hypoglycemia and intervention: No  Fever: No  TPN and/or TF: Yes  TPN and rate: 41 cc/hr    /75   Pulse 101   Temp 98.5 °F (36.9 °C) (Oral)   Resp 15   Ht 5' 3" (1.6 m)   Wt 56.4 kg (124 lb 5.4 oz)   LMP  (LMP Unknown)   SpO2 97%   Breastfeeding? No   BMI 22.03 kg/m²     Labs Reviewed and Include    Recent Labs   Lab 12/13/18  0600   *   CALCIUM 8.2*   ALBUMIN 2.4*   PROT 4.8*      K 4.4   CO2 21*      BUN 44*   CREATININE 1.0   ALKPHOS 192*   ALT 25   AST 24   BILITOT 1.0     Lab Results   Component Value Date    WBC 3.57 (L) 12/13/2018    HGB 7.3 (L) 12/13/2018    HCT 23.6 (L) 12/13/2018    MCV 94 12/13/2018     (L) 12/13/2018     No results for input(s): TSH, FREET4 in the last 168 hours.  Lab Results   Component Value Date    HGBA1C 6.0 (H) 10/04/2018       Nutritional status:   Body mass index is 22.03 kg/m².  Lab Results   Component Value Date    ALBUMIN 2.4 (L) 12/13/2018    ALBUMIN 2.5 (L) 12/12/2018    ALBUMIN 2.9 (L) 12/11/2018     Lab Results   Component Value Date    PREALBUMIN 13 (L) 12/10/2018    PREALBUMIN 15 (L) 11/03/2018    PREALBUMIN 12 (L) 09/17/2018       Estimated Creatinine Clearance: 43.9 mL/min (based on SCr of 1 mg/dL).    Accu-Checks  Recent Labs     12/12/18  1335 12/12/18  1630 12/12/18  1803 12/12/18  2001 12/12/18  2157 12/13/18  0004 12/13/18  0206 12/13/18  0408 12/13/18  0607 12/13/18  0807   POCTGLUCOSE 225* 281* 358* 322* 295* 136* 229* 259* 257* 234*       Current Medications and/or Treatments Impacting Glycemic Control  Immunotherapy:    Immunosuppressants         Stop Route Frequency     tacrolimus capsule 1 mg      -- Oral 2 times daily        Steroids:   Hormones (From admission, onward)    Start     Stop Route " Frequency Ordered    12/11/18 2200  octreotide injection 50 mcg      -- IV Every 8 hours 12/11/18 1531    10/17/18 0945  predniSONE tablet 15 mg      10/24 0859 Oral Daily 10/17/18 0943        Pressors:    Autonomic Drugs (From admission, onward)    Start     Stop Route Frequency Ordered    10/06/18 1617  rocuronium (ZEMURON) 10 mg/mL injection     Comments:  Created by cabinet override    10/07 0429   10/06/18 1617        Hyperglycemia/Diabetes Medications:   Antihyperglycemics (From admission, onward)    Start     Stop Route Frequency Ordered    12/10/18 1730  insulin regular (Humulin R) 100 Units in sodium chloride 0.9% 100 mL infusion     Question:  Insulin Rate Adjustment (DO NOT MODIFY ANSWER)  Answer:  [email or web address]    -- IV Continuous 12/10/18 1626    12/09/18 1554  insulin regular injection 4 Units      -- IV Once 12/09/18 1554    10/07/18 1200  insulin regular (Humulin R) 100 Units in sodium chloride 0.9% 100 mL infusion      10/13 2100 IV Continuous 10/07/18 1055

## 2018-12-13 NOTE — ASSESSMENT & PLAN NOTE
- obtain chest xray and echo  - diamox 500mg iv x 1 12/9/18  - lasix 40 mg iv x 1 12/10/18, another lasix 20 mg iv x 1  -  thoracentesis with chest tube placement 12/11/18, removed 12/13

## 2018-12-13 NOTE — ASSESSMENT & PLAN NOTE
- Valcyte for CMV prophylaxis--> holding (10/31) for leukopenia. Pt will need weekly CMV PCR while discontinued.  - CMV PCR noted to be positive at 129. .  - CMV PCR 11/8: 105  - CMV PCR 11/15 with 89 copies  - CMV PCR 11/23 95 - increased from previous. Continue to hold Valcyte & monitor pt closely.  - CMV PCR 11/29 146  - CMV PCR 12/6 529 - start treatment dose valcyte 12/9/18  - CMV PCR 12/13 pending    -Bactrim for PCP prophylaxis ([facility]).--> holding (10/31) for leukopenia.   - Start Atovaquone 11/6.  - Fluconazole for fungal prophylaxis (first dose 10/6). D/c Fluc 11/16 bc pt had been on it for > 30 days post take back.

## 2018-12-13 NOTE — PT/OT/SLP PROGRESS
Occupational Therapy   Treatment    Name: Scarlett Reddy  MRN: [id number]  Admitting Diagnosis:  Liver transplanted  24 Days Post-Op    Recommendations:     Discharge Recommendations: (HHOT/PT)  Discharge Equipment Recommendations:  walker, rolling(bedside commode)  Barriers to discharge:  None    Subjective     Pain/Comfort:  · Pain Rating 1: 0/10  · Pain Rating Post-Intervention 1: 0/10    Objective:     Communicated with: RN prior to session.  Patient found with all lines intact, call button in reach and Rn notified and telemetry, pulse ox (continuous), VALORIE drain upon OT entry to room.    General Precautions: Standard, fall, contact   Orthopedic Precautions:N/A   Braces: N/A     Occupational Performance:    Bed Mobility:    · NT, pt found seated EOB upon arrival    Functional Mobility/Transfers:  · Patient completed Sit <> Stand Transfer with contact guard assistance  with  rolling walker from EOB  · Patient completed Bed <> Chair Transfer using Stand Pivot technique with contact guard assistance with rolling walker  · Functional Mobility: Pt completed functional mobility in hallway ( ~200ft) with CGA and RW for balance and safety. She tolerated well with no LOB or SOB.     Activities of Daily Living:  · Upper Body Dressing: minimum assistance to casper gown like jacket while seated EOB  · Lower Body Dressing: moderate assistance to casper B shoes while seated EOB  ** pt declined further self care tasks    [facility] 6 Click ADL: 19    Treatment & Education:  -Pt edu on OT role/POC  -Importance of OOB activity with staff assistance  -Safety during functional t/f and mobility  - White board updated  - Multiple self care tasks/functional mobility completed- -assistance level noted above    Patient left up in chair with all lines intact, call button in reach and RN notified  Education:    Assessment:     Scarlett Reddy is a 68 y.o. female with a medical diagnosis of Liver transplanted.  She presents with the following performance  deficits affecting function are weakness, impaired endurance, gait instability, impaired balance, impaired functional mobilty, impaired self care skills, edema. Pt demo's good progress towards goals. She requires CGA with functional mobility using RW and min-mod with dressing. She would benefit from HHOT following d/c to continue to progress towards goals and improve quality of life.     Rehab Prognosis:  Good; patient would benefit from acute skilled OT services to address these deficits and reach maximum level of function.       Plan:     Patient to be seen 3 x/week to address the above listed problems via self-care/home management, therapeutic activities, therapeutic exercises, neuromuscular re-education  · Plan of Care Expires: 01/09/19  · Plan of Care Reviewed with: patient, spouse    This Plan of care has been discussed with the patient who was involved in its development and understands and is in agreement with the identified goals and treatment plan    GOALS:   Multidisciplinary Problems     Occupational Therapy Goals        Problem: Occupational Therapy Goal    Goal Priority Disciplines Outcome Interventions   Occupational Therapy Goal     OT, PT/OT Ongoing (interventions implemented as appropriate)    Description:  Goals to be met by: 12/22/18    Patient will increase functional independence with ADLs by performing:    UE Dressing with Supervision.  LE Dressing with Supervision.  Grooming while standing at sink with Supervision.   Toileting from toilet with Supervision for hygiene and clothing management.   Toilet transfer to toilet with Supervision.                           Time Tracking:     OT Date of Treatment: 12/13/18  OT Start Time: 1515  OT Stop Time: 1534  OT Total Time (min): 19 min    Billable Minutes:Therapeutic Exercise 19    Laura corado OT  12/13/2018

## 2018-12-13 NOTE — ASSESSMENT & PLAN NOTE
- Fluctuating H&H.    - Transfusions: 10/14; 10/16; 10/18; 10/19; 10/22; 10/27; 10/29, 10/31, 11/3, 11/21, 11/29, 12/4, 12/13  - FOBT +  - LDH elevated, Hapto < 10.  - EGD 10/18: ulcerated duodenal mucosa.   - Consulted GI about this ongoing issue and further need of another EGD.  - EGD 11/5 unremarkable.   - Iron studies 11/13:  95, TIBC 107, sat iron 89, transferrin 72, ferritin pending  - Hemolytic anemia workup 11/13: Haptoglobin 132, retic 3.9   - H/H dropped today, 12/13 - transfused 1u PRBC   - No overt signs of bleeding.  - Continue to monitor.

## 2018-12-13 NOTE — ASSESSMENT & PLAN NOTE
- Paracentesis 10/1, 5L removed, no fluid sent for analysis.  - Pt remains volume overloaded.   - Liver US 11/6 with severe ascites.   - Paracentesis 11/7 with 8.9 L removed. Wbc 39, segs 16%. Gram stain - no organisms seen and no WBC. Cultures NGTD.  - Paracentesis 11/12: 8.6 L removed. 34 wbc, 25% segs, 33 lymphs, 42 monocytes/macrophages. Cultures NGTD  - Paracentesis 11/21 with 4 L removed, neg for infection.  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 Milky appearance, cell count of fluid (51% lymphs), triglycerides 2208, consistent with chyle leak.   - Peritoneal fluid 11/29 growing enterococcus and will plan for treatment with Vanc x 10 days per ID recs.   - Attempted low fat, high protein diet due to suspicion for chyle leak with no improvement seen in fluid character.  - CT abd/pelvis 12/3 shows small volume abdominal ascites, improved compared to prior, pelvis ascites unchanged from prior, unchanged b/l pleural effusions (large on R, moderate on L) with bibasilar compressive atelectasis and consolidative changes.  - Placed PICC line and started TPN + octreotide 12/3. Improvement in color of fluid seen 12/6 - 12/7 (less milky, more abbie). Will continue with TPN + adv diet to low fat at this time.  - Continue to monitor.  -lasix 40 mg iv x 1 12/8/18  - diamox 500mg iv x 1 12/9/18  - lasix 40 mg iv x 1 12/10/18  - Lasix 20 mg iv x 1 12/11, 12/12, 12/13

## 2018-12-14 LAB
ALBUMIN SERPL BCP-MCNC: 2.2 G/DL
ALP SERPL-CCNC: 171 U/L
ALT SERPL W/O P-5'-P-CCNC: 25 U/L
ANION GAP SERPL CALC-SCNC: 7 MMOL/L
AST SERPL-CCNC: 25 U/L
BASOPHILS # BLD AUTO: 0.03 K/UL
BASOPHILS NFR BLD: 0.8 %
BILIRUB SERPL-MCNC: 1.1 MG/DL
BUN SERPL-MCNC: 43 MG/DL
CALCIUM SERPL-MCNC: 8.2 MG/DL
CHLORIDE SERPL-SCNC: 108 MMOL/L
CMV DNA SERPL NAA+PROBE-ACNC: <35 IU/ML
CO2 SERPL-SCNC: 24 MMOL/L
CREAT SERPL-MCNC: 1 MG/DL
DIFFERENTIAL METHOD: ABNORMAL
EOSINOPHIL # BLD AUTO: 0.1 K/UL
EOSINOPHIL NFR BLD: 1.9 %
ERYTHROCYTE [DISTWIDTH] IN BLOOD BY AUTOMATED COUNT: 15.9 %
EST. GFR  (AFRICAN AMERICAN): >60 ML/MIN/1.73 M^2
EST. GFR  (NON AFRICAN AMERICAN): 57.6 ML/MIN/1.73 M^2
GLUCOSE SERPL-MCNC: 134 MG/DL
HCT VFR BLD AUTO: 25.4 %
HGB BLD-MCNC: 7.9 G/DL
IMM GRANULOCYTES # BLD AUTO: 0.16 K/UL
IMM GRANULOCYTES NFR BLD AUTO: 4.3 %
LYMPHOCYTES # BLD AUTO: 0.7 K/UL
LYMPHOCYTES NFR BLD: 19.3 %
MAGNESIUM SERPL-MCNC: 1.9 MG/DL
MCH RBC QN AUTO: 29.4 PG
MCHC RBC AUTO-ENTMCNC: 31.1 G/DL
MCV RBC AUTO: 94 FL
MONOCYTES # BLD AUTO: 0.5 K/UL
MONOCYTES NFR BLD: 12.2 %
NEUTROPHILS # BLD AUTO: 2.3 K/UL
NEUTROPHILS NFR BLD: 61.5 %
NRBC BLD-RTO: 0 /100 WBC
PHOSPHATE SERPL-MCNC: 3.5 MG/DL
PLATELET # BLD AUTO: 102 K/UL
PMV BLD AUTO: 9.7 FL
POCT GLUCOSE: 131 MG/DL (ref 70–110)
POCT GLUCOSE: 149 MG/DL (ref 70–110)
POCT GLUCOSE: 150 MG/DL (ref 70–110)
POCT GLUCOSE: 153 MG/DL (ref 70–110)
POCT GLUCOSE: 165 MG/DL (ref 70–110)
POCT GLUCOSE: 170 MG/DL (ref 70–110)
POCT GLUCOSE: 207 MG/DL (ref 70–110)
POCT GLUCOSE: 230 MG/DL (ref 70–110)
POCT GLUCOSE: 231 MG/DL (ref 70–110)
POCT GLUCOSE: 283 MG/DL (ref 70–110)
POTASSIUM SERPL-SCNC: 4.2 MMOL/L
PROT SERPL-MCNC: 4.4 G/DL
RBC # BLD AUTO: 2.69 M/UL
SODIUM SERPL-SCNC: 139 MMOL/L
TACROLIMUS BLD-MCNC: 4.4 NG/ML
WBC # BLD AUTO: 3.68 K/UL

## 2018-12-14 PROCEDURE — 25000003 PHARM REV CODE 250: Performed by: STUDENT IN AN ORGANIZED HEALTH CARE EDUCATION/TRAINING PROGRAM

## 2018-12-14 PROCEDURE — 85025 COMPLETE CBC W/AUTO DIFF WBC: CPT

## 2018-12-14 PROCEDURE — 63600175 PHARM REV CODE 636 W HCPCS: Performed by: NURSE PRACTITIONER

## 2018-12-14 PROCEDURE — 84100 ASSAY OF PHOSPHORUS: CPT

## 2018-12-14 PROCEDURE — 63600175 PHARM REV CODE 636 W HCPCS: Performed by: PHYSICIAN ASSISTANT

## 2018-12-14 PROCEDURE — 25000003 PHARM REV CODE 250: Performed by: TRANSPLANT SURGERY

## 2018-12-14 PROCEDURE — 99233 SBSQ HOSP IP/OBS HIGH 50: CPT | Mod: ,,, | Performed by: NURSE PRACTITIONER

## 2018-12-14 PROCEDURE — 63600175 PHARM REV CODE 636 W HCPCS: Performed by: SURGERY

## 2018-12-14 PROCEDURE — A4216 STERILE WATER/SALINE, 10 ML: HCPCS | Performed by: TRANSPLANT SURGERY

## 2018-12-14 PROCEDURE — 25000003 PHARM REV CODE 250: Performed by: SURGERY

## 2018-12-14 PROCEDURE — 25000003 PHARM REV CODE 250: Performed by: PHYSICIAN ASSISTANT

## 2018-12-14 PROCEDURE — A4217 STERILE WATER/SALINE, 500 ML: HCPCS | Performed by: SURGERY

## 2018-12-14 PROCEDURE — 99233 SBSQ HOSP IP/OBS HIGH 50: CPT | Mod: 24,,, | Performed by: PHYSICIAN ASSISTANT

## 2018-12-14 PROCEDURE — 20600001 HC STEP DOWN PRIVATE ROOM

## 2018-12-14 PROCEDURE — 25000003 PHARM REV CODE 250: Performed by: NURSE PRACTITIONER

## 2018-12-14 PROCEDURE — 83735 ASSAY OF MAGNESIUM: CPT

## 2018-12-14 PROCEDURE — 80053 COMPREHEN METABOLIC PANEL: CPT

## 2018-12-14 PROCEDURE — 80197 ASSAY OF TACROLIMUS: CPT

## 2018-12-14 PROCEDURE — 94761 N-INVAS EAR/PLS OXIMETRY MLT: CPT

## 2018-12-14 RX ORDER — GLUCAGON 1 MG
1 KIT INJECTION
Status: DISCONTINUED | OUTPATIENT
Start: 2018-12-14 | End: 2018-12-20

## 2018-12-14 RX ORDER — DIPHENHYDRAMINE HCL 25 MG
25 CAPSULE ORAL ONCE
Status: COMPLETED | OUTPATIENT
Start: 2018-12-14 | End: 2018-12-14

## 2018-12-14 RX ORDER — FUROSEMIDE 10 MG/ML
20 INJECTION INTRAMUSCULAR; INTRAVENOUS ONCE
Status: DISCONTINUED | OUTPATIENT
Start: 2018-12-14 | End: 2018-12-14

## 2018-12-14 RX ORDER — FUROSEMIDE 10 MG/ML
20 INJECTION INTRAMUSCULAR; INTRAVENOUS ONCE
Status: COMPLETED | OUTPATIENT
Start: 2018-12-14 | End: 2018-12-14

## 2018-12-14 RX ORDER — IBUPROFEN 200 MG
16 TABLET ORAL
Status: DISCONTINUED | OUTPATIENT
Start: 2018-12-14 | End: 2018-12-20

## 2018-12-14 RX ORDER — INSULIN ASPART 100 [IU]/ML
2-4 INJECTION, SOLUTION INTRAVENOUS; SUBCUTANEOUS
Status: DISCONTINUED | OUTPATIENT
Start: 2018-12-14 | End: 2018-12-17

## 2018-12-14 RX ORDER — INSULIN ASPART 100 [IU]/ML
0-4 INJECTION, SOLUTION INTRAVENOUS; SUBCUTANEOUS
Status: DISCONTINUED | OUTPATIENT
Start: 2018-12-14 | End: 2018-12-20

## 2018-12-14 RX ORDER — IBUPROFEN 200 MG
24 TABLET ORAL
Status: DISCONTINUED | OUTPATIENT
Start: 2018-12-14 | End: 2018-12-20

## 2018-12-14 RX ADMIN — TACROLIMUS 1 MG: 1 CAPSULE ORAL at 08:12

## 2018-12-14 RX ADMIN — ATOVAQUONE 1500 MG: 750 SUSPENSION ORAL at 08:12

## 2018-12-14 RX ADMIN — HEPARIN SODIUM 5000 UNITS: 5000 INJECTION, SOLUTION INTRAVENOUS; SUBCUTANEOUS at 10:12

## 2018-12-14 RX ADMIN — PANTOPRAZOLE SODIUM 40 MG: 40 TABLET, DELAYED RELEASE ORAL at 08:12

## 2018-12-14 RX ADMIN — VALGANCICLOVIR 450 MG: 450 TABLET, FILM COATED ORAL at 10:12

## 2018-12-14 RX ADMIN — ENTECAVIR 0.5 MG: 0.5 TABLET ORAL at 08:12

## 2018-12-14 RX ADMIN — MAGNESIUM OXIDE TAB 400 MG (241.3 MG ELEMENTAL MG) 800 MG: 400 (241.3 MG) TAB at 08:12

## 2018-12-14 RX ADMIN — OCTREOTIDE ACETATE 50 MCG: 100 INJECTION, SOLUTION INTRAVENOUS; SUBCUTANEOUS at 10:12

## 2018-12-14 RX ADMIN — DIPHENHYDRAMINE HYDROCHLORIDE 25 MG: 25 CAPSULE ORAL at 01:12

## 2018-12-14 RX ADMIN — OCTREOTIDE ACETATE 50 MCG: 100 INJECTION, SOLUTION INTRAVENOUS; SUBCUTANEOUS at 02:12

## 2018-12-14 RX ADMIN — FUROSEMIDE 20 MG: 10 INJECTION, SOLUTION INTRAMUSCULAR; INTRAVENOUS at 02:12

## 2018-12-14 RX ADMIN — SODIUM CHLORIDE 1.6 UNITS/HR: 9 INJECTION, SOLUTION INTRAVENOUS at 02:12

## 2018-12-14 RX ADMIN — Medication 10 ML: at 06:12

## 2018-12-14 RX ADMIN — INSULIN ASPART 1 UNITS: 100 INJECTION, SOLUTION INTRAVENOUS; SUBCUTANEOUS at 06:12

## 2018-12-14 RX ADMIN — METOPROLOL TARTRATE 25 MG: 25 TABLET ORAL at 08:12

## 2018-12-14 RX ADMIN — CALCIUM GLUCONATE: 94 INJECTION, SOLUTION INTRAVENOUS at 10:12

## 2018-12-14 RX ADMIN — TACROLIMUS 1 MG: 1 CAPSULE ORAL at 06:12

## 2018-12-14 RX ADMIN — SODIUM CHLORIDE 2.1 UNITS/HR: 9 INJECTION, SOLUTION INTRAVENOUS at 10:12

## 2018-12-14 RX ADMIN — PANTOPRAZOLE SODIUM 40 MG: 40 TABLET, DELAYED RELEASE ORAL at 10:12

## 2018-12-14 RX ADMIN — MAGNESIUM OXIDE TAB 400 MG (241.3 MG ELEMENTAL MG) 800 MG: 400 (241.3 MG) TAB at 10:12

## 2018-12-14 RX ADMIN — OCTREOTIDE ACETATE 50 MCG: 100 INJECTION, SOLUTION INTRAVENOUS; SUBCUTANEOUS at 05:12

## 2018-12-14 RX ADMIN — VALGANCICLOVIR 450 MG: 450 TABLET, FILM COATED ORAL at 08:12

## 2018-12-14 RX ADMIN — ASPIRIN 81 MG CHEWABLE TABLET 81 MG: 81 TABLET CHEWABLE at 08:12

## 2018-12-14 RX ADMIN — HEPARIN SODIUM 5000 UNITS: 5000 INJECTION, SOLUTION INTRAVENOUS; SUBCUTANEOUS at 08:12

## 2018-12-14 RX ADMIN — INSULIN ASPART 2 UNITS: 100 INJECTION, SOLUTION INTRAVENOUS; SUBCUTANEOUS at 06:12

## 2018-12-14 NOTE — ASSESSMENT & PLAN NOTE
- obtain chest xray and echo  - diamox 500mg iv x 1 12/9/18  - lasix 40 mg iv x 1 12/10/18, another lasix 20 mg iv x 1  -  thoracentesis with chest tube placement 12/11/18, removed 12/13. No pneumo noted on CXR

## 2018-12-14 NOTE — PT/OT/SLP PROGRESS
"Physical Therapy      Patient Name:  Scarlett Reddy   MRN:  [id number]    Attempted to visit pt in AM - pt politely refused therapy d/t c/o fatigue and feeling "sleepy."  Writing therapist unable to return later in day. Will follow-up next scheduled date.    Marcelino Bird, PT   12/14/2018      "

## 2018-12-14 NOTE — PROGRESS NOTES
"[facility]  Adult Nutrition  Progress Note    SUMMARY       Recommendations    Recommendation/Intervention: 1. When medically able, ADAT to Cardiac, Diabetic diet. 2. Continue supplemental TPN as needed- meets >85% EEN/EPN 3. Dietitian Following  Goals: 1. Patient to meet >85% EEN/EPN. 2. 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 Pt indicated with severe malnutrition per NFPE 12/12. Pt continues on TPN (no lipids) and has been advanced to sugar-free clear liquids. Pt has 50-75% intake clear liquid diet and was not taking all the food on her tray because she was worried there was sugar in it; impressed on pt that everything she is getting is sugar free.  Nutrition Discharge Planning: Adequate po intake to meet nutrition needs.    Nutrition Risk Screen         Nutrition/Diet History    Patient Reported Diet/Restrictions/Preferences: diabetic diet, low salt(Cultural preferences)  Food Preferences: Cultural preferences Noted  Supplemental Drinks or Food Habits: Boost Plus, Ensure Plus(Strawberry)  Food Allergies: NKFA  Factors Affecting Nutritional Intake: clear liquid diet    Anthropometrics    Temp: 98.5 °F (36.9 °C)  Height Method: Stated  Height: 5' 3" (160 cm)  Height (inches): 63 in  Weight Method: Bed Scale  Weight: 57.1 kg (125 lb 14.1 oz)  Weight (lb): 125.88 lb  Ideal Body Weight (IBW), Female: 115 lb  % Ideal Body Weight, Female (lb): 105.63 lb  BMI (Calculated): 21.6  (RETIRED) BMI Grade: 25 - 29.9 - " overweight  Weight Loss: unintentional  Weight Loss Since Admission: 16 lb 0.4 oz       Lab/Procedures/Meds    Pertinent Labs Reviewed: reviewed  Pertinent Labs Comments: POCT glu 149, H/H 7.9/25.4, BUN 43, GLu 134, Alk phos 171, Alb 2.2  Pertinent Medications Reviewed: reviewed  Pertinent Medications Comments: lasix, MgO, insulin regular, pantoprazole, prograf, IV NS    Physical Findings/Assessment    (RETIRED) Overall Physical Appearance: loss of subcutaneous fat, loss of muscle mass  RETIRED Oral/Mouth Cavity: tooth/teeth missing  (RETIRED) Skin: edema, intact, skin tear(Skin Tear Lower Abdomen)    Estimated/Assessed Needs    Weight Used For Calorie Calculations: 55.6 kg (122 lb 9.2 oz)  Energy Calorie Requirements (kcal): 1668  Energy Need Method: Kcal/kg(30 kcal/kg)  Protein Requirements: 66-78(1.2-1.4 gm/kg)  Weight Used For Protein Calculations: 55.6 kg (122 lb 9.2 oz)  Fluid Requirements (mL): 1 mL/kcal or per MD  Estimated Fluid Requirement Method: RDA Method  RDA Method (mL): 1668     Nutrition Prescription Ordered    Current Diet Order: Clear Liquids/Bariatric  Nutrition Order Comments: Fluid - 1500mL  Current Nutrition Support Formula Ordered: (60gm AA 350gm Dextrose)  Current Nutrition Support Rate Ordered: 50 (ml)  Current Nutrition Support Frequency Ordered: 50mL/hr x 24 hrs  Oral Nutrition Supplement: -    Evaluation of Received Nutrient/Fluid Intake    Parenteral Calories (kcal): 1430  Parenteral Protein (gm): 60  Parenteral Fluid (mL): 1200  Lipid Calories (kcals): 500 kcals  GIR (Glucose Infusion Rate) (mg/kg/min): 4.4 mg/kg/min  (RETIRED) % Kcal Needs: 86  (RETIRED) % Protein Needs: 88  I/O: + 1.3L since admission; +160ml x 24 hrs  Energy Calories Required: meeting needs  Protein Required: not meeting needs  Comments: LBM 12/13  Tolerance: tolerating  % Intake of Estimated Energy Needs: 50 - 75 %  % Meal Intake: 50 - 75 %    Nutrition Risk    Level of Risk/Frequency of Follow-up: high      Assessment and Plan   Nutrition Problem  Severe Malnutrition in the context of Chronic Illness/Injury     Related to (etiology):  ESLD     Signs and Symptoms (as evidenced by):  Energy Intake: <75% of estimated energy requirement for 2 months  Body Fat Depletion: Severe depletion of orbitals and triceps   Muscle Mass Depletion: Severe depletion of temples, clavicle region and lower extremities as well as protruding acromion process   Weight Loss: 15% x 1 year    Monitor and Evaluation    Food and Nutrient Intake: energy intake, food and beverage intake, parenteral nutrition intake  Food and Nutrient Adminstration: diet order, enteral and parenteral nutrition administration  Knowledge/Beliefs/Attitudes: food and nutrition knowledge/skill  Physical Activity and Function: nutrition-related ADLs and IADLs  Anthropometric Measurements: weight change, weight  Biochemical Data, Medical Tests and Procedures: electrolyte and renal panel, gastrointestinal profile, glucose/endocrine profile, inflammatory profile, lipid profile  Nutrition-Focused Physical Findings: overall appearance, extremities, muscles and bones, head and eyes, skin       Nutrition Follow-Up    RD Follow-up?: Yes

## 2018-12-14 NOTE — ASSESSMENT & PLAN NOTE
- Dietary consulted.   - Temporal and distal extremity muscle wasting and edema.  - Encourage supplements and to increase nutritional intake.  - Per nephrology, pt to be on low protein diet.  - Prealbumin reviewed.   - Tolerating diet.  Denies n/v.    - Attempted low fat, high protein diet due to suspicion for chyle leak with no improvement seen in fluid character.  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 Had advanced diet with return of milky drainage,   - cont on TPN- pt advanced clear liquid diet 12/12; full liquid diet 12/14  - Monitor.

## 2018-12-14 NOTE — ASSESSMENT & PLAN NOTE
- Paracentesis 10/1, 5L removed, no fluid sent for analysis.  - Pt remains volume overloaded.   - Liver US 11/6 with severe ascites.   - Paracentesis 11/7 with 8.9 L removed. Wbc 39, segs 16%. Gram stain - no organisms seen and no WBC. Cultures NGTD.  - Paracentesis 11/12: 8.6 L removed. 34 wbc, 25% segs, 33 lymphs, 42 monocytes/macrophages. Cultures NGTD  - Paracentesis 11/21 with 4 L removed, neg for infection.  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 Milky appearance, cell count of fluid (51% lymphs), triglycerides 2208, consistent with chyle leak.   - Peritoneal fluid 11/29 growing enterococcus - treatment with Vanc x 10 days (completed 12/9) per ID recs.   - Attempted low fat, high protein diet due to suspicion for chyle leak with no improvement seen in fluid character.  - CT abd/pelvis 12/3 shows small volume abdominal ascites, improved compared to prior, pelvis ascites unchanged from prior, unchanged b/l pleural effusions (large on R, moderate on L) with bibasilar compressive atelectasis and consolidative changes.  - Placed PICC line and started TPN + octreotide 12/3. Improvement in color of fluid seen 12/6 - 12/7 (less milky, more abbie). Will continue with TPN + adv diet to low fat at this time.  - Continue to monitor.  -lasix 40 mg iv x 1 12/8/18  - diamox 500mg iv x 1 12/9/18  - lasix 40 mg iv x 1 12/10/18  - Lasix 20 mg iv x 1 12/11, 12/12, 12/13, 12/14

## 2018-12-14 NOTE — PLAN OF CARE
Problem: Patient Care Overview  Goal: Plan of Care Review  Outcome: Ongoing (interventions implemented as appropriate)  Pt is AAOx4 in bed wearing non-skid footwear, bed in low/locked position and with call bell within reach. Pt reminded to use call bell to call for assistance, pt verbalizes understanding.  at bedside. Pt is afebrile at this time. Proper hand hygiene performed before and after pt care activities. Lasix gtt infusing at 1.6 units/hr. BG monitored Q2hrs, has ranged 165-257 during night and gtt adjusted accordingly. RLQ grenade with 70cc cloudy yellow output so far this shift. Right back old chest tube site with dressing CDI. Dressing to chevron incision CDI. Patient c/o pain and itching, PRN tylenol administered as well as one time dose of benadryl. Denies any pain or discomfort at this time.

## 2018-12-14 NOTE — ASSESSMENT & PLAN NOTE
- Valcyte for CMV prophylaxis--> holding (10/31) for leukopenia. Pt will need weekly CMV PCR while discontinued.  - CMV PCR noted to be positive at 129. .  - CMV PCR 11/8: 105  - CMV PCR 11/15 with 89 copies  - CMV PCR 11/23 95 - increased from previous. Continue to hold Valcyte & monitor pt closely.  - CMV PCR 11/29 146  - CMV PCR 12/6 529 - start treatment dose valcyte 12/9/18  - CMV PCR 12/13 pending    - Bactrim for PCP prophylaxis ([facility]).--> holding (10/31) for leukopenia.   - Start Atovaquone 11/6.  - Fluconazole for fungal prophylaxis (first dose 10/6). D/c Fluc 11/16 bc pt had been on it for > 30 days post take back.

## 2018-12-14 NOTE — ASSESSMENT & PLAN NOTE
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   - POD 1 US: increased arterial resistive indices, suggestive of edema vs acute rejection vs congestion.  - Repeat US 10/9 with continued elevation of RIs.  - LFTs stable.  - T bili trending down.   - Repeat liver US (10/11): elevated RIs.  - Liver US 11/6 to assess for ascites. No arterial flow within the liver allograft concerning for interval thrombosis/occlusion of the arterial system. Severe ascites seen.  - Liver US 11/8: arterial flow seen w/ elevated RIs   - LFTs remain stable. No CTA at this time due to EVA and normal liver function.   - Liver US 11/15 showed "abnormally decreased main hepatic artery peak systolic velocity with slow arterial flow and abnormal tardus parvus waveforms intrahepatically."   -Vascular surgery consulted  - Liver US 11/19 showed no arterial flow on the right and sluggish flow on the left.   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 No further intervention planned at this time as patient's LFTs are stable. Will monitor.  - CT abd/pelvis 11/26 showed: 1. Persistent large volume of ascites. 2. Large right and moderate left pleural effusions with adjacent basilar atelectasis. 3. Anasarca  - IR placed perc drain 11/29 due to suspicion for chyle leak. 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 Octreotide dc'd.  - Fluid also growing enterococcus - treatment with Vanc x 10 days per ID recs, completed.   "

## 2018-12-14 NOTE — ASSESSMENT & PLAN NOTE
- Fluctuating H&H.    - Transfusions: 10/14; 10/16; 10/18; 10/19; 10/22; 10/27; 10/29, 10/31, 11/3, 11/21, 11/29, 12/4, 12/13  - FOBT +  - LDH elevated, Hapto < 10.  - EGD 10/18: ulcerated duodenal mucosa.   - Consulted GI about this ongoing issue and further need of another EGD.  - EGD 11/5 unremarkable.   - Iron studies 11/13:  95, TIBC 107, sat iron 89, transferrin 72, ferritin pending  - Hemolytic anemia workup 11/13: Haptoglobin 132, retic 3.9   - H/H dropped today, 12/13 - transfused 1u PRBC, minimal improvement in H/H  - No overt signs of bleeding.  - Continue to monitor.

## 2018-12-14 NOTE — ASSESSMENT & PLAN NOTE
- Common post liver transplant  - CT 11/26 showed: Large right and moderate left pleural effusions with adjacent basilar atelectasis.  - Pt sp02 stable on RA, but c/o some orthopnea.  - CT abd/pelvis 12/3 showed unchanged b/l pleural effusions (large on R, moderate on L) with bibasilar compressive atelectasis and consolidative changes.  - Sp02 stable & no signs of respiratory infection  - repeat echo and chest xray with pleural effusion   - thoracentesis with chest tube placement 12/11/18 - fluid negative for infection  - CXR 12/12 w/ improvements noted.  - chest tube removed 12/13- CXR w/ no pneumo. Bilateral pleural effusion still present

## 2018-12-14 NOTE — PLAN OF CARE
Problem: Patient Care Overview  Goal: Plan of Care Review  Recommendations     Recommendation/Intervention: 1. When medically able, ADAT to Cardiac, Diabetic diet. 2. Continue supplemental TPN as needed- meets >85% EEN/EPN 3. Dietitian Following  Goals: 1. Patient to meet >85% EEN/EPN. 2. Promote nutrition related labs WNL  Nutrition Goal Status: progressing towards goal    Assessment and Plan   Nutrition Problem  Severe Malnutrition in the context of Chronic Illness/Injury     Related to (etiology):  ESLD     Signs and Symptoms (as evidenced by):  Energy Intake: <75% of estimated energy requirement for 2 months  Body Fat Depletion: Severe depletion of orbitals and triceps   Muscle Mass Depletion: Severe depletion of temples, clavicle region and lower extremities as well as protruding acromion process   Weight Loss: 15% x 1 year

## 2018-12-14 NOTE — PROGRESS NOTES
[facility]  Liver Transplant  Progress Note    Patient Name: Scarlett Reddy  MRN: [id number]  Admission Date: 10/1/2018  Hospital Length of Stay: 73 days  Code Status: Full Code  Primary Care Provider: Primary Doctor No  Post-Operative Day: 70    ORGAN:   LIVER  Disease Etiology: Cirrhosis: Type B and D  Donor Type:    - Brain Death  CDC High Risk:   No  Donor CMV Status:   Donor CMV Status: Positive  Donor HBcAB:   Negative  Donor HCV Status:   Negative  Donor HBV SETH: Negative  Donor HCV SETH: Negative  Whole or Partial: Whole Liver  Biliary Anastomosis: End to End  Arterial Anatomy: Replaced Left Hepatic and Right Hepatic  Subjective:     History of Present Illness:  Ms. Reddy is a 67 y/o female with PMH of ESLD secondary Hep B and D.  Listed for liver transplant with MELD 23.  Paracentesis 10/1 with 5L removed, no fluid sent for analysis.  Morning labs significant for hyponatremia, Na 119.  Pt admitted to LTS for sodium monitoring.        Hospital Course:  Hospital Course: 67 y/o F h/o HBV/delta cirrhosis s/p DBDLT 10/5/18 (CMV D+/R+, steroid induction, MMF/tacro/pred maintenance) with take back on 10/6 2/2 hyperbilirubinema and bilious VALORIE drainage, no leak identified. Post-op course c/b hypercapneic and hypoxic respiratory failure and was reintubated though quickly extubated now doing well. Liver bx (10/6) sig for Zone 3 hemorrhage and collapse, in addition to cholestasis. Transferred from ICU on POD #4. ID consulted to comment on positive diphtheroid culture from ascitic fluid (likely skin colonization) as well as ESBL Klebsiella pneumoniae in urine culture (10/4).  Pt asymptomatic and cell count from ascitic fluid unremarkable. Per ID recs, no need to treat diphtheroids or asymptomatic ESBL Kleb pneumo bacteriuria though pt has had 6 days of therapy which would cover these organisms. Mild peritransplant ascites- repeat US 10/6 with increased arterial resistive indices. Repeat US 10/9  w/ continued elevation in RIs and fluid collections. LFTs trending down nicely.     Acute gradual worsening of renal function s/p OLTx. Creatinine on arrival 0.8 and has been up trending. Nephrology consulted for post-op EVA. Cr cont to trend up but remains to have good UOP. Had stable response to lasiz diuresis.  BUN elevated requiring multiple rounds of dialysis (10/15, 10/17, 10/20, 10/31). No significant improvements in kidney function noted.   In addition, pt H/H cont to fluctuate requiring multiple blood transfusions (10/14, 10/16, 10/18, 10/19, 10/22, 10/27, 10/29).  Pt reported dark stools 10/15 but color has normalized. FOBT+. EGD 10/18 consistent with ulcerated mucosa in duodenum s/p coagulation. Concern for H. Pylori. Started Amoxicillin/levofloxacin (10/19, complete 11/2)). Remains on Protonix 40 mg BID. Biopsy negative for infection and CMV. Will cont close monitoring H/H, transfuse as needed with goal Hb > 7.0. Of note, Urine cx + Klebsiella pneumoniae (10/13). Received 3 day course of ciprofloxacin, dc'd 10/19.   On 10/19 pm pt c/o crushing chest pain. Troponin 0.094, likely 2/2 ischemic stress. EKG NSR.   Liver US 11/6 showed no arterial flow. Paracentesis 11/7 with 8.9 L removed. Wbc 39, segs 16%. Gram stain no organisms seen + no WBC. Pt feels better since having fluid removed but she continues to have worsening abdominal distention & LE edema.Repeat Liver US 11/8 showed arterial flow with increased RIs. Paracentesis 11/12 with 8.6L removed (WBC 34, segs 25%). 11/15 liver u/s showed abnormally decreased main hepatic artery peak systolic velocity with slow arterial flow and abnormal tardus parvus waveforms intrahepatically. Consulted vascular surgery. Repeat liver US 11/19 showed no arterial flow on R and sluggish flow on L. Hepatic angiogram 11/19 confirmed HAT.  Friday 11/16, purulent drainage noted from middle of chevron incision. Area opened and packed. Cx grew klebsiella p. CT scan showed no  signs of infection. On Monday 11/18, it was noted that the right/lateral side of the chevron had 3 small openings draining purulent fluid. Opened the area and packed. Vac placed on 11/24. Wound care following. CT abd/pelvis 11/3 showed small volume abdominal ascites, improved compared to prior, pelvis ascites unchanged from prior, unchanged b/l pleural effusions (large on R, moderate on L) with bibasilar compressive atelectasis and consolidative changes. Patient finished day 7 of 7 of Ertapenem for kleb pneumo ESBL UTI 11/29. Of note, pt grew same bacteria from wound 11/16. Liver US to assess recanalization post HAT 12/4 -> conclusion that pt may recanalize. Will monitor closely with no further intervention planned at this time. Peritoneal fluid 11/29 w/ enterococcus. Per ID, treat with Vanc x 10 days and then repeat peritoneal fluid cx. Chest xray and echo 12/9 with pleural effusions. Right thoracentesis with chest tube placement done 12/11. Pleural fluid w/ 120 WBCs, 8% segs. Cultures NGTD. Chest tube removed, 12/13 w/o any issue. CXR obtained- no pneumo noted.    Interval history: Chest tube removed yesterday w/o any issues. Received 1u pRBC yesterday- H/H w/ minimal improvement this morning. Will cont to monitor w/ labs. Peritoneal fluid (perc drain) continues to be yellow in nature, but starting to thin out. Completed course of vancomycin on 12/9. Remains on TPN, transitioned to full liquid diet today. Prealbumin 13 on 12/10. Continue to diurese with lasix. Of note, pt has been extremely depressed with reports of crying at night. Psych therapy and/or antipsychotic medication offered to patient, but she declined both options, stating she could help herself. Will cont to monitor. Trazodone to aid insomnia. Atarax PRN for itching. CMV PCR 12/13 pending. Cont to monitor.    Scheduled Meds:   aspirin  81 mg Oral Daily    atovaquone  1,500 mg Oral Daily    entecavir  0.5 mg Oral Daily    furosemide  20 mg  Intravenous Once    heparin (porcine)  5,000 Units Subcutaneous Q12H    insulin regular  4 Units Intravenous Once    magnesium oxide  800 mg Oral BID    metoprolol tartrate  25 mg Oral BID    octreotide  50 mcg Intravenous Q8H    pantoprazole  40 mg Oral BID    sodium chloride 0.9%  10 mL Intravenous Q6H    tacrolimus  1 mg Oral BID    traZODone  25 mg Oral QHS    valGANciclovir  450 mg Oral BID     Continuous Infusions:   insulin (HUMAN R) infusion (adults) 2.2 Units/hr (12/14/18 1154)    TPN ADULT CENTRAL LINE CUSTOM 41 mL/hr at 12/13/18 2101    TPN ADULT CENTRAL LINE CUSTOM       PRN Meds:sodium chloride, acetaminophen, bisacodyl, calcium carbonate, dextrose 50%, dextrose 50%, hydrOXYzine HCl, ondansetron, ondansetron, polyethylene glycol, Flushing PICC Protocol **AND** sodium chloride 0.9% **AND** sodium chloride 0.9%, sodium chloride 0.9%, traMADol    Review of Systems   Constitutional: Positive for activity change and appetite change. Negative for fever.   Respiratory: Negative for cough and shortness of breath.    Cardiovascular: Positive for leg swelling. Negative for chest pain and palpitations.   Gastrointestinal: Positive for abdominal distention and abdominal pain. Negative for diarrhea, nausea and vomiting.   Genitourinary: Negative for decreased urine volume and difficulty urinating.   Skin: Positive for wound.   Allergic/Immunologic: Positive for immunocompromised state.   Neurological: Positive for weakness.   Psychiatric/Behavioral: Positive for dysphoric mood and sleep disturbance.     Objective:     Vital Signs (Most Recent):  Temp: 98.5 °F (36.9 °C) (12/14/18 1025)  Pulse: 99 (12/14/18 1130)  Resp: 17 (12/14/18 1130)  BP: 116/77 (12/14/18 1130)  SpO2: 99 % (12/14/18 1130) Vital Signs (24h Range):  Temp:  [98 °F (36.7 °C)-98.5 °F (36.9 °C)] 98.5 °F (36.9 °C)  Pulse:  [] 99  Resp:  [14-28] 17  SpO2:  [96 %-99 %] 99 %  BP: (116-136)/(72-80) 116/77     Weight: 57.1 kg (125 lb 14.1  oz)  Body mass index is 22.3 kg/m².    Intake/Output - Last 3 Shifts       12/12 0700 - 12/13 0659 12/13 0700 - 12/14 0659 12/14 0700 - 12/15 0659    P.O. 1260 1100 200    I.V. (mL/kg) 45.7 (0.8) 17.2 (0.3) 37.3 (0.7)    Blood  350     Other 0 0 0    .6      Total Intake(mL/kg) 1714.4 (30.4) 1467.2 (25.7) 237.3 (4.2)    Urine (mL/kg/hr) 0 (0) 0 (0) 0 (0)    Drains 410 160     Other       Stool 0 0 0    Chest Tube 150      Total Output 560 160 0    Net +1154.4 +1307.2 +237.3           Urine Occurrence 3 x 9 x 1 x    Stool Occurrence 1 x 3 x 0 x          Physical Exam   Constitutional: She is oriented to person, place, and time.   Hand and temporal muscle wasting   HENT:   Head: Normocephalic.   Eyes: Pupils are equal, round, and reactive to light. Scleral icterus is present.   Neck: Normal range of motion.   Cardiovascular: Normal rate, regular rhythm, normal heart sounds and intact distal pulses.   Pulmonary/Chest: Effort normal. She has decreased breath sounds in the right lower field and the left lower field.   Abdominal: Soft. Bowel sounds are normal. She exhibits distension and ascites.       Perc drain in place w/ yellow drainage  Right sided chest tube in place   Musculoskeletal: Normal range of motion. She exhibits edema.   Neurological: She is alert and oriented to person, place, and time.   Skin: Skin is warm and dry.   Psychiatric: She has a normal mood and affect. Her behavior is normal.   Nursing note and vitals reviewed.      Laboratory:  Immunosuppressants         Stop Route Frequency     tacrolimus capsule 1 mg      -- Oral 2 times daily        CBC:   Recent Labs   Lab 12/14/18  0400   WBC 3.68*   RBC 2.69*   HGB 7.9*   HCT 25.4*   *   MCV 94   MCH 29.4   MCHC 31.1*     CMP:   Recent Labs   Lab 12/14/18  0400   *   CALCIUM 8.2*   ALBUMIN 2.2*   PROT 4.4*      K 4.2   CO2 24      BUN 43*   CREATININE 1.0   ALKPHOS 171*   ALT 25   AST 25   BILITOT 1.1*     Labs within  "the past 24 hours have been reviewed.    Diagnostic Results:  I have personally reviewed all pertinent imaging studies.    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   - POD 1 US: increased arterial resistive indices, suggestive of edema vs acute rejection vs congestion.  - Repeat US 10/9 with continued elevation of RIs.  - LFTs stable.  - T bili trending down.   - Repeat liver US (10/11): elevated RIs.  - Liver US 11/6 to assess for ascites. No arterial flow within the liver allograft concerning for interval thrombosis/occlusion of the arterial system. Severe ascites seen.  - Liver US 11/8: arterial flow seen w/ elevated RIs   - LFTs remain stable. No CTA at this time due to EVA and normal liver function.   - Liver US 11/15 showed "abnormally decreased main hepatic artery peak systolic velocity with slow arterial flow and abnormal tardus parvus waveforms intrahepatically."   -Vascular surgery consulted  - Liver US 11/19 showed no arterial flow on the right and sluggish flow on the left.   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 No further intervention planned at this time as patient's LFTs are stable. Will monitor.  - CT abd/pelvis 11/26 showed: 1. Persistent large volume of ascites. 2. Large right and moderate left pleural effusions with adjacent basilar atelectasis. 3. Anasarca  - IR placed perc drain 11/29 due to suspicion for chyle leak. 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 Octreotide dc'd.  - Fluid also growing enterococcus - treatment with Vanc x 10 days per ID recs, completed.      Sleeps in sitting position due to orthopnea    - obtain chest xray and echo  - diamox 500mg iv x 1 12/9/18  - lasix 40 mg iv x 1 12/10/18, another lasix 20 mg iv x 1  -  " thoracentesis with chest tube placement 12/11/18, removed 12/13. No pneumo noted on CXR       Hypoalbuminemia    - Continue to monitor.     Sequelae of hyperalimentation    - Monitor.       Pleural effusion    - Common post liver transplant  - CT 11/26 showed: Large right and moderate left pleural effusions with adjacent basilar atelectasis.  - Pt sp02 stable on RA, but c/o some orthopnea.  - CT abd/pelvis 12/3 showed unchanged b/l pleural effusions (large on R, moderate on L) with bibasilar compressive atelectasis and consolidative changes.  - Sp02 stable & no signs of respiratory infection  - repeat echo and chest xray with pleural effusion   - thoracentesis with chest tube placement 12/11/18 - fluid negative for infection  - CXR 12/12 w/ improvements noted.  - chest tube removed 12/13- CXR w/ no pneumo. Bilateral pleural effusion still present      Hypomagnesemia    - Continue PO Mag Oxide  - Continue to monitor.       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       Delayed surgical wound healing    - Middle chevron incision opened 11/16 with purulent drainage  - Packed with Aquacel ag at this time. Growing Klebsiella p.   - CT abd/pelvis 11/17- no signs of infection  - 11/17: R side of chevron incision w/ 3 small holes draining purulent fluid, opened and packed w/ aquacel.   - Wound care following.  - Wound w/ increased drainage, so wound care placed vac 11/24/18.  Apprec recs. Extending Ertapenem thru 11/28 per ID.    - Abdomen erythematous and indurated RLQ inferior to chevron.  - CT abd/pelvis 11/26 - no sign of infection  - Last wound vac change 12/7 with improvements noted in wound - depth now 0.5cm, so no longer needs vac per wound care     Hepatic artery thrombosis of transplanted liver    - Liver US 11/19 showed no arterial flow to R and sluggish flow to L  - Hepatic angiogram 11/19 confirmed HAT  -  "Liver US 12/4 to evaluate for recanalization reviewed. Recanalization possible. Will continue to monitor.  - No further intervention planned at this time. Will continue to monitor.     Drug-induced pancytopenia    - Decreased plt, wbc, h/h  - See "leukopenia"  - Continue to monitor.     Thrombocytopenia, unspecified    - Monitor.       Leukopenia    - Stopped Cellcept, Bactrim, and Valcyte 10/31   - Cellcept half dose resumed 11/6  - Started Atovaquone 11/6.   - CMV PCR noted to be positive at 129.Held off on treatment with valcyte, however weekly CMV PCRs increasing, start treatment dose 12/9/18  - Cellcept held 11/23 2/2 infection (UTI + wound)  - Continue to monitor.       At risk for opportunistic infections    - Valcyte for CMV prophylaxis--> holding (10/31) for leukopenia. Pt will need weekly CMV PCR while discontinued.  - CMV PCR noted to be positive at 129. .  - CMV PCR 11/8: 105  - CMV PCR 11/15 with 89 copies  - CMV PCR 11/23 95 - increased from previous. Continue to hold Valcyte & monitor pt closely.  - CMV PCR 11/29 146  - CMV PCR 12/6 529 - start treatment dose valcyte 12/9/18  - CMV PCR 12/13 pending    - Bactrim for PCP prophylaxis ([facility]).--> holding (10/31) for leukopenia.   - Start Atovaquone 11/6.  - Fluconazole for fungal prophylaxis (first dose 10/6). D/c Fluc 11/16 bc pt had been on it for > 30 days post take back.       Long-term use of immunosuppressant medication    - Prograf: stopped 10/29. Resumed 11/2.  --> Will monitor for signs of toxic side effects, check daily troughs, and change meds accordingly.   - Prednisone: stopped 10/29. Started on Hydrocortisone. pred taper resumed.  - Cellcept: stopped 10/31 for leukopenia. Restarted Cellcept 500 mg bid, 11/6.  - Cellcept held 11/23 2/2 infection         Prophylactic immunotherapy    - See long term use immunosuppresion.       Chylous ascites    - Paracentesis 10/1, 5L removed, no fluid sent for analysis.  - Pt remains volume overloaded.   - " Liver US 11/6 with severe ascites.   - Paracentesis 11/7 with 8.9 L removed. Wbc 39, segs 16%. Gram stain - no organisms seen and no WBC. Cultures NGTD.  - Paracentesis 11/12: 8.6 L removed. 34 wbc, 25% segs, 33 lymphs, 42 monocytes/macrophages. Cultures NGTD  - Paracentesis 11/21 with 4 L removed, neg for infection.  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 Milky appearance, cell count of fluid (51% lymphs), triglycerides 2208, consistent with chyle leak.   - Peritoneal fluid 11/29 growing enterococcus - treatment with Vanc x 10 days (completed 12/9) per ID recs.   - Attempted low fat, high protein diet due to suspicion for chyle leak with no improvement seen in fluid character.  - CT abd/pelvis 12/3 shows small volume abdominal ascites, improved compared to prior, pelvis ascites unchanged from prior, unchanged b/l pleural effusions (large on R, moderate on L) with bibasilar compressive atelectasis and consolidative changes.  - Placed PICC line and started TPN + octreotide 12/3. Improvement in color of fluid seen 12/6 - 12/7 (less milky, more abbie). Will continue with TPN + adv diet to low fat at this time.  - Continue to monitor.  -lasix 40 mg iv x 1 12/8/18  - diamox 500mg iv x 1 12/9/18  - lasix 40 mg iv x 1 12/10/18  - Lasix 20 mg iv x 1 12/11, 12/12, 12/13, 12/14     Physical deconditioning    - PT/OT consulted.  Progressing well w/ therapy.  - Recommend home health PT and rolling walker at discharge (orders placed 11/6).       Anemia requiring transfusions    - Fluctuating H&H.    - Transfusions: 10/14; 10/16; 10/18; 10/19; 10/22; 10/27; 10/29, 10/31, 11/3, 11/21, 11/29, 12/4, 12/13  - FOBT +  - LDH elevated, Hapto < 10.  - EGD 10/18: ulcerated duodenal mucosa.   - Consulted GI about this ongoing issue and further need of another EGD.  - EGD 11/5 unremarkable.   - Iron studies 11/13:  95, TIBC 107, sat iron 89, transferrin 72, ferritin pending  - Hemolytic anemia workup 11/13:  Haptoglobin 132, retic 3.9   - H/H dropped today, 12/13 - transfused 1u PRBC, minimal improvement in H/H  - No overt signs of bleeding.  - Continue to monitor.           Severe malnutrition    - Dietary consulted.   - Temporal and distal extremity muscle wasting and edema.  - Encourage supplements and to increase nutritional intake.  - Per nephrology, pt to be on low protein diet.  - Prealbumin reviewed.   - Tolerating diet.  Denies n/v.    - Attempted low fat, high protein diet due to suspicion for chyle leak with no improvement seen in fluid character.  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 Had advanced diet with return of milky drainage,   - cont on TPN- pt advanced clear liquid diet 12/12; full liquid diet 12/14  - Monitor.         Type 2 diabetes mellitus with diabetic polyneuropathy, with long-term current use of insulin    - Continue home regimen.  - TPN started 12/3 and pt now on insulin drip.  - Endocrine following. Appreciate recs.       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  - Tx w/ Entecavir- dose changed to daily 11/26 due to improved renal fx  - 12/3 Hep B surface antigen negative 12/3, surface AB quant 571, and Hep B DNA <10.  - Continue to monitor, per protocol.         VTE Risk Mitigation (From admission, onward)        Ordered     heparin (porcine) injection 5,000 Units  Every 12 hours      12/06/18 1058     IP VTE HIGH RISK PATIENT  Once      11/05/18 1145     Place sequential compression device  Until discontinued      10/05/18 0709          The patients clinical status was discussed at multidisplinary rounds, involving transplant surgery, transplant medicine, pharmacy, nursing, nutrition, and social work    Discharge Planning:  No Patient Care Coordination Note on file.      Jihan Soares PA-C  Liver Transplant  [facility]

## 2018-12-14 NOTE — SUBJECTIVE & OBJECTIVE
Scheduled Meds:   aspirin  81 mg Oral Daily    atovaquone  1,500 mg Oral Daily    entecavir  0.5 mg Oral Daily    furosemide  20 mg Intravenous Once    heparin (porcine)  5,000 Units Subcutaneous Q12H    insulin regular  4 Units Intravenous Once    magnesium oxide  800 mg Oral BID    metoprolol tartrate  25 mg Oral BID    octreotide  50 mcg Intravenous Q8H    pantoprazole  40 mg Oral BID    sodium chloride 0.9%  10 mL Intravenous Q6H    tacrolimus  1 mg Oral BID    traZODone  25 mg Oral QHS    valGANciclovir  450 mg Oral BID     Continuous Infusions:   insulin (HUMAN R) infusion (adults) 2.2 Units/hr (12/14/18 1154)    TPN ADULT CENTRAL LINE CUSTOM 41 mL/hr at 12/13/18 2101    TPN ADULT CENTRAL LINE CUSTOM       PRN Meds:sodium chloride, acetaminophen, bisacodyl, calcium carbonate, dextrose 50%, dextrose 50%, hydrOXYzine HCl, ondansetron, ondansetron, polyethylene glycol, Flushing PICC Protocol **AND** sodium chloride 0.9% **AND** sodium chloride 0.9%, sodium chloride 0.9%, traMADol    Review of Systems   Constitutional: Positive for activity change and appetite change. Negative for fever.   Respiratory: Negative for cough and shortness of breath.    Cardiovascular: Positive for leg swelling. Negative for chest pain and palpitations.   Gastrointestinal: Positive for abdominal distention and abdominal pain. Negative for diarrhea, nausea and vomiting.   Genitourinary: Negative for decreased urine volume and difficulty urinating.   Skin: Positive for wound.   Allergic/Immunologic: Positive for immunocompromised state.   Neurological: Positive for weakness.   Psychiatric/Behavioral: Positive for dysphoric mood and sleep disturbance.     Objective:     Vital Signs (Most Recent):  Temp: 98.5 °F (36.9 °C) (12/14/18 1025)  Pulse: 99 (12/14/18 1130)  Resp: 17 (12/14/18 1130)  BP: 116/77 (12/14/18 1130)  SpO2: 99 % (12/14/18 1130) Vital Signs (24h Range):  Temp:  [98 °F (36.7 °C)-98.5 °F (36.9 °C)] 98.5 °F  (36.9 °C)  Pulse:  [] 99  Resp:  [14-28] 17  SpO2:  [96 %-99 %] 99 %  BP: (116-136)/(72-80) 116/77     Weight: 57.1 kg (125 lb 14.1 oz)  Body mass index is 22.3 kg/m².    Intake/Output - Last 3 Shifts       12/12 0700 - 12/13 0659 12/13 0700 - 12/14 0659 12/14 0700 - 12/15 0659    P.O. 1260 1100 200    I.V. (mL/kg) 45.7 (0.8) 17.2 (0.3) 37.3 (0.7)    Blood  350     Other 0 0 0    .6      Total Intake(mL/kg) 1714.4 (30.4) 1467.2 (25.7) 237.3 (4.2)    Urine (mL/kg/hr) 0 (0) 0 (0) 0 (0)    Drains 410 160     Other       Stool 0 0 0    Chest Tube 150      Total Output 560 160 0    Net +1154.4 +1307.2 +237.3           Urine Occurrence 3 x 9 x 1 x    Stool Occurrence 1 x 3 x 0 x          Physical Exam   Constitutional: She is oriented to person, place, and time.   Hand and temporal muscle wasting   HENT:   Head: Normocephalic.   Eyes: Pupils are equal, round, and reactive to light. Scleral icterus is present.   Neck: Normal range of motion.   Cardiovascular: Normal rate, regular rhythm, normal heart sounds and intact distal pulses.   Pulmonary/Chest: Effort normal. She has decreased breath sounds in the right lower field and the left lower field.   Abdominal: Soft. Bowel sounds are normal. She exhibits distension and ascites.       Perc drain in place w/ yellow drainage  Right sided chest tube in place   Musculoskeletal: Normal range of motion. She exhibits edema.   Neurological: She is alert and oriented to person, place, and time.   Skin: Skin is warm and dry.   Psychiatric: She has a normal mood and affect. Her behavior is normal.   Nursing note and vitals reviewed.      Laboratory:  Immunosuppressants         Stop Route Frequency     tacrolimus capsule 1 mg      -- Oral 2 times daily        CBC:   Recent Labs   Lab 12/14/18  0400   WBC 3.68*   RBC 2.69*   HGB 7.9*   HCT 25.4*   *   MCV 94   MCH 29.4   MCHC 31.1*     CMP:   Recent Labs   Lab 12/14/18  0400   *   CALCIUM 8.2*   ALBUMIN 2.2*    PROT 4.4*      K 4.2   CO2 24      BUN 43*   CREATININE 1.0   ALKPHOS 171*   ALT 25   AST 25   BILITOT 1.1*     Labs within the past 24 hours have been reviewed.    Diagnostic Results:  I have personally reviewed all pertinent imaging studies.

## 2018-12-14 NOTE — CARE UPDATE
Patient on intensive insulin protocol with q 2 hr monitoring  BG labile, insulin rates ranging from 1.2-3 u/hr  TPN infusing at 41 cc/hr  Remains on sugar free clears

## 2018-12-15 LAB
ALBUMIN SERPL BCP-MCNC: 2.4 G/DL
ALP SERPL-CCNC: 175 U/L
ALT SERPL W/O P-5'-P-CCNC: 25 U/L
ANION GAP SERPL CALC-SCNC: 8 MMOL/L
AST SERPL-CCNC: 24 U/L
BACTERIA FLD AEROBE CULT: NO GROWTH
BASOPHILS # BLD AUTO: 0.01 K/UL
BASOPHILS NFR BLD: 0.3 %
BILIRUB SERPL-MCNC: 1 MG/DL
BUN SERPL-MCNC: 40 MG/DL
CALCIUM SERPL-MCNC: 8.2 MG/DL
CHLORIDE SERPL-SCNC: 107 MMOL/L
CO2 SERPL-SCNC: 24 MMOL/L
CREAT SERPL-MCNC: 0.9 MG/DL
DIFFERENTIAL METHOD: ABNORMAL
EOSINOPHIL # BLD AUTO: 0.1 K/UL
EOSINOPHIL NFR BLD: 2.6 %
ERYTHROCYTE [DISTWIDTH] IN BLOOD BY AUTOMATED COUNT: 16.3 %
EST. GFR  (AFRICAN AMERICAN): >60 ML/MIN/1.73 M^2
EST. GFR  (NON AFRICAN AMERICAN): >60 ML/MIN/1.73 M^2
GLUCOSE SERPL-MCNC: 127 MG/DL
GRAM STN SPEC: NORMAL
GRAM STN SPEC: NORMAL
HCT VFR BLD AUTO: 25 %
HGB BLD-MCNC: 8.1 G/DL
IMM GRANULOCYTES # BLD AUTO: 0.14 K/UL
IMM GRANULOCYTES NFR BLD AUTO: 4.5 %
LYMPHOCYTES # BLD AUTO: 0.5 K/UL
LYMPHOCYTES NFR BLD: 17.4 %
MAGNESIUM SERPL-MCNC: 2.1 MG/DL
MCH RBC QN AUTO: 30.2 PG
MCHC RBC AUTO-ENTMCNC: 32.4 G/DL
MCV RBC AUTO: 93 FL
MONOCYTES # BLD AUTO: 0.3 K/UL
MONOCYTES NFR BLD: 8.7 %
NEUTROPHILS # BLD AUTO: 2.1 K/UL
NEUTROPHILS NFR BLD: 66.5 %
NRBC BLD-RTO: 0 /100 WBC
PHOSPHATE SERPL-MCNC: 3.4 MG/DL
PLATELET # BLD AUTO: 107 K/UL
PMV BLD AUTO: 9.9 FL
POCT GLUCOSE: 106 MG/DL (ref 70–110)
POCT GLUCOSE: 125 MG/DL (ref 70–110)
POCT GLUCOSE: 145 MG/DL (ref 70–110)
POCT GLUCOSE: 178 MG/DL (ref 70–110)
POCT GLUCOSE: 246 MG/DL (ref 70–110)
POTASSIUM SERPL-SCNC: 4 MMOL/L
PROT SERPL-MCNC: 4.7 G/DL
RBC # BLD AUTO: 2.68 M/UL
SODIUM SERPL-SCNC: 139 MMOL/L
TACROLIMUS BLD-MCNC: 3.6 NG/ML
WBC # BLD AUTO: 3.11 K/UL

## 2018-12-15 PROCEDURE — 99232 SBSQ HOSP IP/OBS MODERATE 35: CPT | Mod: ,,, | Performed by: NURSE PRACTITIONER

## 2018-12-15 PROCEDURE — 25000003 PHARM REV CODE 250: Performed by: NURSE PRACTITIONER

## 2018-12-15 PROCEDURE — 94664 DEMO&/EVAL PT USE INHALER: CPT

## 2018-12-15 PROCEDURE — 84100 ASSAY OF PHOSPHORUS: CPT

## 2018-12-15 PROCEDURE — 80053 COMPREHEN METABOLIC PANEL: CPT

## 2018-12-15 PROCEDURE — 63600175 PHARM REV CODE 636 W HCPCS: Performed by: PHYSICIAN ASSISTANT

## 2018-12-15 PROCEDURE — 25000003 PHARM REV CODE 250: Performed by: TRANSPLANT SURGERY

## 2018-12-15 PROCEDURE — 63600175 PHARM REV CODE 636 W HCPCS: Performed by: NURSE PRACTITIONER

## 2018-12-15 PROCEDURE — 85025 COMPLETE CBC W/AUTO DIFF WBC: CPT

## 2018-12-15 PROCEDURE — A4216 STERILE WATER/SALINE, 10 ML: HCPCS | Performed by: TRANSPLANT SURGERY

## 2018-12-15 PROCEDURE — 99233 SBSQ HOSP IP/OBS HIGH 50: CPT | Mod: 24,,, | Performed by: NURSE PRACTITIONER

## 2018-12-15 PROCEDURE — 20600001 HC STEP DOWN PRIVATE ROOM

## 2018-12-15 PROCEDURE — A4217 STERILE WATER/SALINE, 500 ML: HCPCS | Performed by: NURSE PRACTITIONER

## 2018-12-15 PROCEDURE — 80197 ASSAY OF TACROLIMUS: CPT

## 2018-12-15 PROCEDURE — 25000003 PHARM REV CODE 250: Performed by: PHYSICIAN ASSISTANT

## 2018-12-15 PROCEDURE — 83735 ASSAY OF MAGNESIUM: CPT

## 2018-12-15 RX ADMIN — PANTOPRAZOLE SODIUM 40 MG: 40 TABLET, DELAYED RELEASE ORAL at 08:12

## 2018-12-15 RX ADMIN — OCTREOTIDE ACETATE 50 MCG: 100 INJECTION, SOLUTION INTRAVENOUS; SUBCUTANEOUS at 02:12

## 2018-12-15 RX ADMIN — VALGANCICLOVIR 450 MG: 450 TABLET, FILM COATED ORAL at 08:12

## 2018-12-15 RX ADMIN — INSULIN ASPART 2 UNITS: 100 INJECTION, SOLUTION INTRAVENOUS; SUBCUTANEOUS at 12:12

## 2018-12-15 RX ADMIN — ASPIRIN 81 MG CHEWABLE TABLET 81 MG: 81 TABLET CHEWABLE at 08:12

## 2018-12-15 RX ADMIN — OCTREOTIDE ACETATE 50 MCG: 100 INJECTION, SOLUTION INTRAVENOUS; SUBCUTANEOUS at 05:12

## 2018-12-15 RX ADMIN — ACETAMINOPHEN 650 MG: 325 TABLET, FILM COATED ORAL at 11:12

## 2018-12-15 RX ADMIN — INSULIN ASPART 4 UNITS: 100 INJECTION, SOLUTION INTRAVENOUS; SUBCUTANEOUS at 12:12

## 2018-12-15 RX ADMIN — TACROLIMUS 1 MG: 1 CAPSULE ORAL at 08:12

## 2018-12-15 RX ADMIN — HEPARIN SODIUM 5000 UNITS: 5000 INJECTION, SOLUTION INTRAVENOUS; SUBCUTANEOUS at 09:12

## 2018-12-15 RX ADMIN — Medication 10 ML: at 06:12

## 2018-12-15 RX ADMIN — HEPARIN SODIUM 5000 UNITS: 5000 INJECTION, SOLUTION INTRAVENOUS; SUBCUTANEOUS at 08:12

## 2018-12-15 RX ADMIN — MAGNESIUM OXIDE TAB 400 MG (241.3 MG ELEMENTAL MG) 800 MG: 400 (241.3 MG) TAB at 08:12

## 2018-12-15 RX ADMIN — Medication 10 ML: at 12:12

## 2018-12-15 RX ADMIN — TACROLIMUS 1 MG: 1 CAPSULE ORAL at 05:12

## 2018-12-15 RX ADMIN — SODIUM CHLORIDE 1.5 UNITS/HR: 9 INJECTION, SOLUTION INTRAVENOUS at 04:12

## 2018-12-15 RX ADMIN — MAGNESIUM OXIDE TAB 400 MG (241.3 MG ELEMENTAL MG) 800 MG: 400 (241.3 MG) TAB at 09:12

## 2018-12-15 RX ADMIN — CALCIUM GLUCONATE: 94 INJECTION, SOLUTION INTRAVENOUS at 09:12

## 2018-12-15 RX ADMIN — INSULIN ASPART 2 UNITS: 100 INJECTION, SOLUTION INTRAVENOUS; SUBCUTANEOUS at 09:12

## 2018-12-15 RX ADMIN — ENTECAVIR 0.5 MG: 0.5 TABLET ORAL at 08:12

## 2018-12-15 RX ADMIN — INSULIN ASPART 4 UNITS: 100 INJECTION, SOLUTION INTRAVENOUS; SUBCUTANEOUS at 05:12

## 2018-12-15 RX ADMIN — PANTOPRAZOLE SODIUM 40 MG: 40 TABLET, DELAYED RELEASE ORAL at 09:12

## 2018-12-15 RX ADMIN — METOPROLOL TARTRATE 25 MG: 25 TABLET ORAL at 09:12

## 2018-12-15 RX ADMIN — OCTREOTIDE ACETATE 50 MCG: 100 INJECTION, SOLUTION INTRAVENOUS; SUBCUTANEOUS at 09:12

## 2018-12-15 RX ADMIN — VALGANCICLOVIR 450 MG: 450 TABLET, FILM COATED ORAL at 09:12

## 2018-12-15 RX ADMIN — ATOVAQUONE 1500 MG: 750 SUSPENSION ORAL at 08:12

## 2018-12-15 NOTE — ASSESSMENT & PLAN NOTE
Managed per primary team  Avoid hypoglycemia    Recent Labs   Lab 12/14/18  0400   ALT 25   AST 25   ALKPHOS 171*   BILITOT 1.1*   PROT 4.4*   ALBUMIN 2.2*

## 2018-12-15 NOTE — ASSESSMENT & PLAN NOTE
Managed per primary team  Avoid hypoglycemia    Recent Labs   Lab 12/15/18  0600   ALT 25   AST 24   ALKPHOS 175*   BILITOT 1.0   PROT 4.7*   ALBUMIN 2.4*

## 2018-12-15 NOTE — ASSESSMENT & PLAN NOTE
- Valcyte for CMV prophylaxis--> holding (10/31) for leukopenia. Pt will need weekly CMV PCR while discontinued.  - CMV PCR noted to be positive at 129. .  - CMV PCR 11/8: 105  - CMV PCR 11/15 with 89 copies  - CMV PCR 11/23 95 - increased from previous. Continue to hold Valcyte & monitor pt closely.  - CMV PCR 11/29 146.  - CMV PCR 12/6 529 - start treatment dose valcyte 12/9/18  - CMV PCR 12/13 < 35.  - Bactrim for PCP prophylaxis (MW).--> holding (10/31) for leukopenia.   - Started Atovaquone 11/6.  - Fluconazole for fungal prophylaxis (first dose 10/6). D/c Fluc 11/16 bc pt had been on it for > 30 days post take back.

## 2018-12-15 NOTE — PROGRESS NOTES
"[facility]  Endocrinology  Progress Note    Admit Date: 10/1/2018     Reason for Consult: Management of type 2 DM, Hyperglycemia      Surgical Procedure and Date: Liver transplant 10/5/18, Ex lap 10/6/18     Diabetes diagnosis year: 2013     Home Diabetes Medications:  Lantus 18 units HS and novolog 17 units with meals plus correction scale (150-200 +1)        Lab Results   Component Value Date     HGBA1C 6.8 (H) 08/16/2018        How often checking glucose at home? 3-4   BG readings on regimen: 120-150.  Post prandial readings as high as upper 200s  Hypoglycemia on the regimen? yes, none recently   Missed doses on regimen?  No     Diabetes Complications include:     Diabetic peripheral neuropathy      Complicating diabetes co morbidities:   CIRRHOSIS        HPI:  Patient is a 68 y.o. female with a diagnosis of ESLD, listed for liver transplant with meld 23 who underwent live transplant on 10/5/18.  Back to OR on 10/6/18 for ex lap.  Admitted 10/1/18 for hyponatremia s/p paracentesis.  Personal has known diagnosis of diabetes, endocrine consulted for diabetes management.    Post-operative course complicated by ESBL Klebsiella pneumoniae in urine (in contact isolation), peritransplant ascites, worsening renal function (required temporary dialysis), anemia (multiple blood transfusions), and possible GI bleed.     Interval HPI:   Overnight events: Remains in TSU, NAEON.  BG  well controlled overnight on transition IV insulin infusion, lowered from 2.1 to 1.8 u/hr per protocol at bedtime for BG of 131.  Eating:   <25%  Nausea: No  Hypoglycemia and intervention: No  Fever: No  TPN and/or TF: Yes  TPN and rate: 41 cc/hr    /71   Pulse 94   Temp 98 °F (36.7 °C) (Oral)   Resp 17   Ht 5' 3" (1.6 m)   Wt 56.6 kg (124 lb 12.5 oz)   LMP  (LMP Unknown)   SpO2 98%   Breastfeeding? No   BMI 22.10 kg/m²      Labs Reviewed and Include    Recent Labs   Lab 12/15/18  0600   *   CALCIUM 8.2* "   ALBUMIN 2.4*   PROT 4.7*      K 4.0   CO2 24      BUN 40*   CREATININE 0.9   ALKPHOS 175*   ALT 25   AST 24   BILITOT 1.0     Lab Results   Component Value Date    WBC 3.68 (L) 12/14/2018    HGB 7.9 (L) 12/14/2018    HCT 25.4 (L) 12/14/2018    MCV 94 12/14/2018     (L) 12/14/2018     No results for input(s): TSH, FREET4 in the last 168 hours.  Lab Results   Component Value Date    HGBA1C 6.0 (H) 10/04/2018       Nutritional status:   Body mass index is 22.1 kg/m².  Lab Results   Component Value Date    ALBUMIN 2.4 (L) 12/15/2018    ALBUMIN 2.2 (L) 12/14/2018    ALBUMIN 2.4 (L) 12/13/2018     Lab Results   Component Value Date    PREALBUMIN 13 (L) 12/10/2018    PREALBUMIN 15 (L) 11/03/2018    PREALBUMIN 12 (L) 09/17/2018       Estimated Creatinine Clearance: 48.8 mL/min (based on SCr of 0.9 mg/dL).    Accu-Checks  Recent Labs     12/14/18  0157 12/14/18  0400 12/14/18  0555 12/14/18  0801 12/14/18  1001 12/14/18  1153 12/14/18  1419 12/14/18  1824 12/14/18  2237 12/15/18  0312   POCTGLUCOSE 165* 150* 153* 149* 170* 207* 283* 230* 131* 145*       Current Medications and/or Treatments Impacting Glycemic Control  Immunotherapy:    Immunosuppressants         Stop Route Frequency     tacrolimus capsule 1 mg      -- Oral 2 times daily        Steroids:   Hormones (From admission, onward)    Start     Stop Route Frequency Ordered    12/11/18 2200  octreotide injection 50 mcg      -- IV Every 8 hours 12/11/18 1531    10/17/18 0945  predniSONE tablet 15 mg      10/24 0859 Oral Daily 10/17/18 0943        Pressors:    Autonomic Drugs (From admission, onward)    Start     Stop Route Frequency Ordered    10/06/18 1617  rocuronium (ZEMURON) 10 mg/mL injection     Comments:  Created by cabinet override    10/07 0429   10/06/18 1617        Hyperglycemia/Diabetes Medications:   Antihyperglycemics (From admission, onward)    Start     Stop Route Frequency Ordered    12/14/18 1700  insulin regular (Humulin R) 100  Units in sodium chloride 0.9% 100 mL infusion      -- IV Continuous 12/14/18 1555    12/14/18 1700  insulin aspart U-100 pen 2-4 Units      -- SubQ 3 times daily with meals 12/14/18 1555    12/14/18 1655  insulin aspart U-100 pen 0-4 Units      -- SubQ As needed (PRN) 12/14/18 1555    10/07/18 1200  insulin regular (Humulin R) 100 Units in sodium chloride 0.9% 100 mL infusion      10/13 2100 IV Continuous 10/07/18 1055          ASSESSMENT and PLAN    * Liver transplanted    Managed per primary team  Avoid hypoglycemia    Recent Labs   Lab 12/15/18  0600   ALT 25   AST 24   ALKPHOS 175*   BILITOT 1.0   PROT 4.7*   ALBUMIN 2.4*            Type 2 diabetes mellitus with diabetic polyneuropathy, with long-term current use of insulin    BG goal 140 - 180     Transition IV insulin infusion at 1.8 u/hr  Novolog 2-4 units with meals  Low dose correction  BG monitoring AC/HS/0200    Discharge planning: TBD         Severe malnutrition    On TPN. May elevate BG     Sequelae of hyperalimentation    On TPN, elevating BG readings       Prophylactic immunotherapy    May increase insulin resistance.          Alteration in skin integrity    Optimize BG control to improve wound healing         Rounding done per , Dunia Willams NP  Endocrinology  [facility]

## 2018-12-15 NOTE — PLAN OF CARE
Pt AAOx4, VSS, afebrile. Insulin gtt decreased to 1.8u/hr, TPN continues to be infused at 41ml/hr.  R grenade continues to drain milky yellow fluid.  R UA picc CDI; chevron dressing CDI.  Pt tolerated sleeping supine in bed overnight.  Fall risk precautions initiated.  Pt in lowest bed position setting, lighting adjusted, pt to wear nonskid socks when ambulating, side rails up x2.  Pt remain free from falls during shift.  Call light within reach. Will continue to monitor.

## 2018-12-15 NOTE — ASSESSMENT & PLAN NOTE
- Paracentesis 10/1, 5L removed, no fluid sent for analysis.  - Pt remains volume overloaded.   - Liver US 11/6 with severe ascites.   - Paracentesis 11/7 with 8.9 L removed. Wbc 39, segs 16%. Gram stain - no organisms seen and no WBC. Cultures NGTD.  - Paracentesis 11/12: 8.6 L removed. 34 wbc, 25% segs, 33 lymphs, 42 monocytes/macrophages. Cultures NGTD  - Paracentesis 11/21 with 4 L removed, neg for infection.  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 Milky appearance, cell count of fluid (51% lymphs), triglycerides 2208, consistent with chyle leak.   - Peritoneal fluid 11/29 growing enterococcus - treatment with Vanc x 10 days (completed 12/9) per ID recs.   - Attempted low fat, high protein diet due to suspicion for chyle leak with no improvement seen in fluid character.  - CT abd/pelvis 12/3 shows small volume abdominal ascites, improved compared to prior, pelvis ascites unchanged from prior, unchanged b/l pleural effusions (large on R, moderate on L) with bibasilar compressive atelectasis and consolidative changes.  - Placed PICC line and started TPN + octreotide 12/3. Improvement in color of fluid seen 12/6 - 12/7 (less milky, more abbie).   - continue TPN.  - Lasix 20 mg iv x 1 12/11, 12/12, 12/13, 12/14

## 2018-12-15 NOTE — ASSESSMENT & PLAN NOTE
- Liver US 11/19 showed no arterial flow to R and sluggish flow to L.  - Hepatic angiogram 11/19 confirmed HAT.  - Liver US 12/4 to evaluate for recanalization reviewed. Recanalization possible. Will continue to monitor.  - No further intervention planned at this time. Will continue to monitor.

## 2018-12-15 NOTE — ASSESSMENT & PLAN NOTE
BG goal 140 - 180     Transition IV insulin infusion at 1.8 u/hr  Novolog 2-4 units with meals  Low dose correction  BG monitoring /HS/0200    Discharge planning: [facility]

## 2018-12-15 NOTE — ASSESSMENT & PLAN NOTE
- CT 11/26 showed: Large right and moderate left pleural effusions with adjacent basilar atelectasis.  - Pt sp02 stable on RA, but c/o some orthopnea.  - CT abd/pelvis 12/3 showed unchanged b/l pleural effusions (large on R, moderate on L) with bibasilar compressive atelectasis and consolidative changes.  - Sp02 stable & no signs of respiratory infection.  - repeat echo and chest xray with pleural effusion.  - thoracentesis with chest tube placement 12/11/18 - fluid negative for infection.  - CXR 12/12 w/ improvements noted.  - chest tube removed 12/13- CXR w/ no pneumo. Bilateral pleural effusion still present.

## 2018-12-15 NOTE — ASSESSMENT & PLAN NOTE
- Middle chevron incision opened 11/16 with purulent drainage.  - Packed with Aquacel ag at this time. Growing Klebsiella p.   - CT abd/pelvis 11/17- no signs of infection.  - 11/17: R side of chevron incision w/ 3 small holes draining purulent fluid, opened and packed w/ aquacel.   - Wound care following.  - Wound w/ increased drainage, so wound care placed vac 11/24/18.  Apprec recs. Extending Ertapenem thru 11/28 per ID.    - Abdomen erythematous and indurated RLQ inferior to chevron.  - CT abd/pelvis 11/26 - no sign of infection.  - Last wound vac change 12/7 with improvements noted in wound - depth now 0.5cm.   - No longer needs vac per wound care.

## 2018-12-15 NOTE — SUBJECTIVE & OBJECTIVE
Scheduled Meds:   aspirin  81 mg Oral Daily    atovaquone  1,500 mg Oral Daily    entecavir  0.5 mg Oral Daily    heparin (porcine)  5,000 Units Subcutaneous Q12H    insulin aspart U-100  2-4 Units Subcutaneous TIDWM    magnesium oxide  800 mg Oral BID    metoprolol tartrate  25 mg Oral BID    octreotide  50 mcg Intravenous Q8H    pantoprazole  40 mg Oral BID    sodium chloride 0.9%  10 mL Intravenous Q6H    tacrolimus  1 mg Oral BID    valGANciclovir  450 mg Oral BID     Continuous Infusions:   insulin (HUMAN R) infusion (adults) 1.8 Units/hr (12/15/18 0901)    TPN ADULT CENTRAL LINE CUSTOM 41 mL/hr at 12/14/18 2206    TPN ADULT CENTRAL LINE CUSTOM       PRN Meds:sodium chloride, acetaminophen, bisacodyl, calcium carbonate, dextrose 50%, dextrose 50%, glucagon (human recombinant), glucose, glucose, hydrOXYzine HCl, insulin aspart U-100, ondansetron, ondansetron, polyethylene glycol, Flushing PICC Protocol **AND** sodium chloride 0.9% **AND** sodium chloride 0.9%, sodium chloride 0.9%, traMADol    Review of Systems   Constitutional: Positive for activity change and appetite change. Negative for chills and fever.   HENT: Negative for congestion and facial swelling.    Eyes: Negative for pain and discharge.   Respiratory: Negative for cough, chest tightness, shortness of breath and wheezing.    Cardiovascular: Positive for leg swelling. Negative for chest pain and palpitations.   Gastrointestinal: Positive for abdominal distention and abdominal pain. Negative for diarrhea, nausea and vomiting.   Endocrine: Negative.    Genitourinary: Negative for decreased urine volume and difficulty urinating.   Musculoskeletal: Negative for back pain.   Skin: Positive for wound.   Allergic/Immunologic: Positive for immunocompromised state.   Neurological: Positive for weakness.   Hematological: Negative.    Psychiatric/Behavioral: Positive for dysphoric mood and sleep disturbance.     Objective:     Vital Signs (Most  Recent):  Temp: 98.6 °F (37 °C) (12/15/18 0819)  Pulse: 103 (12/15/18 1114)  Resp: 20 (12/15/18 1114)  BP: 116/70 (12/15/18 1114)  SpO2: 99 % (12/15/18 1114) Vital Signs (24h Range):  Temp:  [98 °F (36.7 °C)-98.6 °F (37 °C)] 98.6 °F (37 °C)  Pulse:  [] 103  Resp:  [14-23] 20  SpO2:  [98 %-99 %] 99 %  BP: ()/(61-71) 116/70     Weight: 56.6 kg (124 lb 12.5 oz)  Body mass index is 22.1 kg/m².    Intake/Output - Last 3 Shifts       12/13 0700 - 12/14 0659 12/14 0700 - 12/15 0659 12/15 0700 - 12/16 0659    P.O. 1100 1040     I.V. (mL/kg) 17.2 (0.3) 78.8 (1.4)     Blood 350      Other 0 0     TPN  1311.3     Total Intake(mL/kg) 1467.2 (25.7) 2430.1 (42.9)     Urine (mL/kg/hr) 0 (0) 0 (0)     Emesis/NG output  0     Drains 160 240     Other  0     Stool 0 0     Blood  0     Chest Tube       Total Output 160 240     Net +1307.2 +2190.1            Urine Occurrence 9 x 9 x     Stool Occurrence 3 x 3 x     Emesis Occurrence  0 x           Physical Exam   Constitutional: She is oriented to person, place, and time. She appears well-developed.   Hand and temporal muscle wasting   HENT:   Head: Normocephalic.   Eyes: Pupils are equal, round, and reactive to light. Scleral icterus is present.   Neck: Normal range of motion. No JVD present.   Cardiovascular: Normal rate, regular rhythm, normal heart sounds and intact distal pulses.   Pulmonary/Chest: Effort normal. She has decreased breath sounds in the right lower field and the left lower field.   Abdominal: Soft. Bowel sounds are normal. She exhibits distension and ascites.       Perc drain in place w/ yellow drainage  Right sided chest tube in place   Musculoskeletal: Normal range of motion. She exhibits edema.   Neurological: She is alert and oriented to person, place, and time.   Skin: Skin is warm and dry.   Psychiatric: She has a normal mood and affect. Her behavior is normal.   Nursing note and vitals reviewed.      Laboratory:  Immunosuppressants         Stop  Route Frequency     tacrolimus capsule 1 mg      -- Oral 2 times daily        CBC:   Recent Labs   Lab 12/15/18  0600   WBC 3.11*   RBC 2.68*   HGB 8.1*   HCT 25.0*   *   MCV 93   MCH 30.2   MCHC 32.4     CMP:   Recent Labs   Lab 12/15/18  0600   *   CALCIUM 8.2*   ALBUMIN 2.4*   PROT 4.7*      K 4.0   CO2 24      BUN 40*   CREATININE 0.9   ALKPHOS 175*   ALT 25   AST 24   BILITOT 1.0     Labs within the past 24 hours have been reviewed.    Diagnostic Results:  I have personally reviewed all pertinent imaging studies.

## 2018-12-15 NOTE — SUBJECTIVE & OBJECTIVE
"Interval HPI:   Overnight events: Remains in TSU, NAEON.  BG well controlled overnight on transition IV insulin infusion, lowered from 2.1 to 1.8 u/hr per protocol at bedtime for BG of 131.  Eating:   <25%  Nausea: No  Hypoglycemia and intervention: No  Fever: No  TPN and/or TF: Yes  TPN and rate: 41 cc/hr    /71   Pulse 94   Temp 98 °F (36.7 °C) (Oral)   Resp 17   Ht 5' 3" (1.6 m)   Wt 56.6 kg (124 lb 12.5 oz)   LMP  (LMP Unknown)   SpO2 98%   Breastfeeding? No   BMI 22.10 kg/m²     Labs Reviewed and Include    Recent Labs   Lab 12/15/18  0600   *   CALCIUM 8.2*   ALBUMIN 2.4*   PROT 4.7*      K 4.0   CO2 24      BUN 40*   CREATININE 0.9   ALKPHOS 175*   ALT 25   AST 24   BILITOT 1.0     Lab Results   Component Value Date    WBC 3.68 (L) 12/14/2018    HGB 7.9 (L) 12/14/2018    HCT 25.4 (L) 12/14/2018    MCV 94 12/14/2018     (L) 12/14/2018     No results for input(s): TSH, FREET4 in the last 168 hours.  Lab Results   Component Value Date    HGBA1C 6.0 (H) 10/04/2018       Nutritional status:   Body mass index is 22.1 kg/m².  Lab Results   Component Value Date    ALBUMIN 2.4 (L) 12/15/2018    ALBUMIN 2.2 (L) 12/14/2018    ALBUMIN 2.4 (L) 12/13/2018     Lab Results   Component Value Date    PREALBUMIN 13 (L) 12/10/2018    PREALBUMIN 15 (L) 11/03/2018    PREALBUMIN 12 (L) 09/17/2018       Estimated Creatinine Clearance: 48.8 mL/min (based on SCr of 0.9 mg/dL).    Accu-Checks  Recent Labs     12/14/18  0157 12/14/18  0400 12/14/18  0555 12/14/18  0801 12/14/18  1001 12/14/18  1153 12/14/18  1419 12/14/18  1824 12/14/18  2237 12/15/18  0312   POCTGLUCOSE 165* 150* 153* 149* 170* 207* 283* 230* 131* 145*       Current Medications and/or Treatments Impacting Glycemic Control  Immunotherapy:    Immunosuppressants         Stop Route Frequency     tacrolimus capsule 1 mg      -- Oral 2 times daily        Steroids:   Hormones (From admission, onward)    Start     Stop Route Frequency " Ordered    12/11/18 2200  octreotide injection 50 mcg      -- IV Every 8 hours 12/11/18 1531    10/17/18 0945  predniSONE tablet 15 mg      10/24 0859 Oral Daily 10/17/18 0943        Pressors:    Autonomic Drugs (From admission, onward)    Start     Stop Route Frequency Ordered    10/06/18 1617  rocuronium (ZEMURON) 10 mg/mL injection     Comments:  Created by cabinet override    10/07 0429   10/06/18 1617        Hyperglycemia/Diabetes Medications:   Antihyperglycemics (From admission, onward)    Start     Stop Route Frequency Ordered    12/14/18 1700  insulin regular (Humulin R) 100 Units in sodium chloride 0.9% 100 mL infusion      -- IV Continuous 12/14/18 1555    12/14/18 1700  insulin aspart U-100 pen 2-4 Units      -- SubQ 3 times daily with meals 12/14/18 1555    12/14/18 1655  insulin aspart U-100 pen 0-4 Units      -- SubQ As needed (PRN) 12/14/18 1555    10/07/18 1200  insulin regular (Humulin R) 100 Units in sodium chloride 0.9% 100 mL infusion      10/13 2100 IV Continuous 10/07/18 1055

## 2018-12-15 NOTE — ASSESSMENT & PLAN NOTE
- Fluctuating H&H.    - Transfusions: 10/14; 10/16; 10/18; 10/19; 10/22; 10/27; 10/29, 10/31, 11/3, 11/21, 11/29, 12/4, 12/13.  - FOBT +  - LDH elevated, Hapto < 10.  - EGD 10/18: ulcerated duodenal mucosa.   - Consulted GI about this ongoing issue and further need of another EGD.  - EGD 11/5 unremarkable.   - Iron studies 11/13:  95, TIBC 107, sat iron 89, transferrin 72.  - Hemolytic anemia workup 11/13: Haptoglobin 132, retic 3.9 .  - 12/13 - transfused 1u PRBC, minimal improvement in H/H.  - No overt signs of bleeding.  - Continue to monitor.

## 2018-12-15 NOTE — PLAN OF CARE
Problem: Patient Care Overview  Goal: Plan of Care Review  Outcome: Ongoing (interventions implemented as appropriate)    AAOx4. VS stable, afebrile. Denies pain. R UA PICC, CDI. No redness, no swelling. TPN going @ 41 cc/hr. Accuchecks continued achs. Insulin gtt infusing @ 1.8u/hr (transitional). SSI given when necessary. Chevron dressing reinforced today. R grenade draining milky yellow fluid. Contact precautions maintained. Bile cx growing klebsiella. Up with assistance. Spouse @ bedside. Bed low and locked, call bell within reach, side railsx2. In NAD. Will continue to monitor.

## 2018-12-15 NOTE — PROGRESS NOTES
[facility]  Liver Transplant  Progress Note    Patient Name: Scarlett Reddy  MRN: [id number]  Admission Date: 10/1/2018  Hospital Length of Stay: 74 days  Code Status: Full Code  Primary Care Provider: Primary Doctor No  Post-Operative Day: 71    ORGAN:   LIVER  Disease Etiology: Cirrhosis: Type B and D  Donor Type:    - Brain Death  CDC High Risk:   No  Donor CMV Status:   Donor CMV Status: Positive  Donor HBcAB:   Negative  Donor HCV Status:   Negative  Donor HBV SETH: Negative  Donor HCV SETH: Negative  Whole or Partial: Whole Liver  Biliary Anastomosis: End to End  Arterial Anatomy: Replaced Left Hepatic and Right Hepatic  Subjective:     History of Present Illness:  Ms. Reddy is a 69 y/o female with PMH of ESLD secondary Hep B and D.  Listed for liver transplant with MELD 23.  Paracentesis 10/1 with 5L removed, no fluid sent for analysis.  Morning labs significant for hyponatremia, Na 119.  Pt admitted to LTS for sodium monitoring.        Hospital Course:  Hospital Course: 69 y/o F h/o HBV/delta cirrhosis s/p DBDLT 10/5/18 (CMV D+/R+, steroid induction, MMF/tacro/pred maintenance) with take back on 10/6 2/2 hyperbilirubinema and bilious VALORIE drainage, no leak identified. Post-op course c/b hypercapneic and hypoxic respiratory failure and was reintubated though quickly extubated now doing well. Liver bx (10/6) sig for Zone 3 hemorrhage and collapse, in addition to cholestasis. Transferred from ICU on POD #4. ID consulted to comment on positive diphtheroid culture from ascitic fluid (likely skin colonization) as well as ESBL Klebsiella pneumoniae in urine culture (10/4).  Pt asymptomatic and cell count from ascitic fluid unremarkable. Per ID recs, no need to treat diphtheroids or asymptomatic ESBL Kleb pneumo bacteriuria though pt has had 6 days of therapy which would cover these organisms. Mild peritransplant ascites- repeat US 10/6 with increased arterial resistive indices. Repeat US 10/9  w/ continued elevation in RIs and fluid collections. LFTs trending down nicely.     Acute gradual worsening of renal function s/p OLTx. Creatinine on arrival 0.8 and has been up trending. Nephrology consulted for post-op EVA. Cr cont to trend up but remains to have good UOP. Had stable response to lasiz diuresis.  BUN elevated requiring multiple rounds of dialysis (10/15, 10/17, 10/20, 10/31). No significant improvements in kidney function noted.   In addition, pt H/H cont to fluctuate requiring multiple blood transfusions (10/14, 10/16, 10/18, 10/19, 10/22, 10/27, 10/29).  Pt reported dark stools 10/15 but color has normalized. FOBT+. EGD 10/18 consistent with ulcerated mucosa in duodenum s/p coagulation. Concern for H. Pylori. Started Amoxicillin/levofloxacin (10/19, complete 11/2)). Remains on Protonix 40 mg BID. Biopsy negative for infection and CMV. Will cont close monitoring H/H, transfuse as needed with goal Hb > 7.0. Of note, Urine cx + Klebsiella pneumoniae (10/13). Received 3 day course of ciprofloxacin, dc'd 10/19.   On 10/19 pm pt c/o crushing chest pain. Troponin 0.094, likely 2/2 ischemic stress. EKG NSR.   Liver US 11/6 showed no arterial flow. Paracentesis 11/7 with 8.9 L removed. Wbc 39, segs 16%. Gram stain no organisms seen + no WBC. Pt feels better since having fluid removed but she continues to have worsening abdominal distention & LE edema.Repeat Liver US 11/8 showed arterial flow with increased RIs. Paracentesis 11/12 with 8.6L removed (WBC 34, segs 25%). 11/15 liver u/s showed abnormally decreased main hepatic artery peak systolic velocity with slow arterial flow and abnormal tardus parvus waveforms intrahepatically. Consulted vascular surgery. Repeat liver US 11/19 showed no arterial flow on R and sluggish flow on L. Hepatic angiogram 11/19 confirmed HAT.  Friday 11/16, purulent drainage noted from middle of chevron incision. Area opened and packed. Cx grew klebsiella p. CT scan showed no  signs of infection. On Monday 11/18, it was noted that the right/lateral side of the chevron had 3 small openings draining purulent fluid. Opened the area and packed. Vac placed on 11/24. Wound care following. CT abd/pelvis 11/3 showed small volume abdominal ascites, improved compared to prior, pelvis ascites unchanged from prior, unchanged b/l pleural effusions (large on R, moderate on L) with bibasilar compressive atelectasis and consolidative changes. Patient finished day 7 of 7 of Ertapenem for kleb pneumo ESBL UTI 11/29. Of note, pt grew same bacteria from wound 11/16. Liver US to assess recanalization post HAT 12/4 -> conclusion that pt may recanalize. Will monitor closely with no further intervention planned at this time. Peritoneal fluid 11/29 w/ enterococcus. Per ID, treat with Vanc x 10 days and then repeat peritoneal fluid cx. Chest xray and echo 12/9 with pleural effusions. Right thoracentesis with chest tube placement done 12/11. Pleural fluid w/ 120 WBCs, 8% segs. Cultures NGTD. Chest tube removed, 12/13 w/o any issue. CXR obtained- no pneumo noted.    Interval history: No acute events overnight. LFTs stable. Perc drain remains in place with yellow milky drainage. Continue full liquid diet and TPN. Pt tolerating well. Pt remains depressed but declined to speak with psychiatry or start any new medications for her mood. Pt also would like to stop sleep aids. Trazodone dc'd. CMV PCR 12/13 < 35. Monitor.     Scheduled Meds:   aspirin  81 mg Oral Daily    atovaquone  1,500 mg Oral Daily    entecavir  0.5 mg Oral Daily    heparin (porcine)  5,000 Units Subcutaneous Q12H    insulin aspart U-100  2-4 Units Subcutaneous TIDWM    magnesium oxide  800 mg Oral BID    metoprolol tartrate  25 mg Oral BID    octreotide  50 mcg Intravenous Q8H    pantoprazole  40 mg Oral BID    sodium chloride 0.9%  10 mL Intravenous Q6H    tacrolimus  1 mg Oral BID    valGANciclovir  450 mg Oral BID     Continuous  Infusions:   insulin (HUMAN R) infusion (adults) 1.8 Units/hr (12/15/18 0901)    TPN ADULT CENTRAL LINE CUSTOM 41 mL/hr at 12/14/18 2206    TPN ADULT CENTRAL LINE CUSTOM       PRN Meds:sodium chloride, acetaminophen, bisacodyl, calcium carbonate, dextrose 50%, dextrose 50%, glucagon (human recombinant), glucose, glucose, hydrOXYzine HCl, insulin aspart U-100, ondansetron, ondansetron, polyethylene glycol, Flushing PICC Protocol **AND** sodium chloride 0.9% **AND** sodium chloride 0.9%, sodium chloride 0.9%, traMADol    Review of Systems   Constitutional: Positive for activity change and appetite change. Negative for chills and fever.   HENT: Negative for congestion and facial swelling.    Eyes: Negative for pain and discharge.   Respiratory: Negative for cough, chest tightness, shortness of breath and wheezing.    Cardiovascular: Positive for leg swelling. Negative for chest pain and palpitations.   Gastrointestinal: Positive for abdominal distention and abdominal pain. Negative for diarrhea, nausea and vomiting.   Endocrine: Negative.    Genitourinary: Negative for decreased urine volume and difficulty urinating.   Musculoskeletal: Negative for back pain.   Skin: Positive for wound.   Allergic/Immunologic: Positive for immunocompromised state.   Neurological: Positive for weakness.   Hematological: Negative.    Psychiatric/Behavioral: Positive for dysphoric mood and sleep disturbance.     Objective:     Vital Signs (Most Recent):  Temp: 98.6 °F (37 °C) (12/15/18 0819)  Pulse: 103 (12/15/18 1114)  Resp: 20 (12/15/18 1114)  BP: 116/70 (12/15/18 1114)  SpO2: 99 % (12/15/18 1114) Vital Signs (24h Range):  Temp:  [98 °F (36.7 °C)-98.6 °F (37 °C)] 98.6 °F (37 °C)  Pulse:  [] 103  Resp:  [14-23] 20  SpO2:  [98 %-99 %] 99 %  BP: ()/(61-71) 116/70     Weight: 56.6 kg (124 lb 12.5 oz)  Body mass index is 22.1 kg/m².    Intake/Output - Last 3 Shifts       12/13 0700 - 12/14 0659 12/14 0700 - 12/15 0659 12/15  0700 - 12/16 0659    P.O. 1100 1040     I.V. (mL/kg) 17.2 (0.3) 78.8 (1.4)     Blood 350      Other 0 0     TPN  1311.3     Total Intake(mL/kg) 1467.2 (25.7) 2430.1 (42.9)     Urine (mL/kg/hr) 0 (0) 0 (0)     Emesis/NG output  0     Drains 160 240     Other  0     Stool 0 0     Blood  0     Chest Tube       Total Output 160 240     Net +1307.2 +2190.1            Urine Occurrence 9 x 9 x     Stool Occurrence 3 x 3 x     Emesis Occurrence  0 x           Physical Exam   Constitutional: She is oriented to person, place, and time. She appears well-developed.   Hand and temporal muscle wasting   HENT:   Head: Normocephalic.   Eyes: Pupils are equal, round, and reactive to light. Scleral icterus is present.   Neck: Normal range of motion. No JVD present.   Cardiovascular: Normal rate, regular rhythm, normal heart sounds and intact distal pulses.   Pulmonary/Chest: Effort normal. She has decreased breath sounds in the right lower field and the left lower field.   Abdominal: Soft. Bowel sounds are normal. She exhibits distension and ascites.       Perc drain in place w/ yellow drainage  Right sided chest tube in place   Musculoskeletal: Normal range of motion. She exhibits edema.   Neurological: She is alert and oriented to person, place, and time.   Skin: Skin is warm and dry.   Psychiatric: She has a normal mood and affect. Her behavior is normal.   Nursing note and vitals reviewed.      Laboratory:  Immunosuppressants         Stop Route Frequency     tacrolimus capsule 1 mg      -- Oral 2 times daily        CBC:   Recent Labs   Lab 12/15/18  0600   WBC 3.11*   RBC 2.68*   HGB 8.1*   HCT 25.0*   *   MCV 93   MCH 30.2   MCHC 32.4     CMP:   Recent Labs   Lab 12/15/18  0600   *   CALCIUM 8.2*   ALBUMIN 2.4*   PROT 4.7*      K 4.0   CO2 24      BUN 40*   CREATININE 0.9   ALKPHOS 175*   ALT 25   AST 24   BILITOT 1.0     Labs within the past 24 hours have been reviewed.    Diagnostic Results:  I have  "personally reviewed all pertinent imaging studies.    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   - POD 1 US: increased arterial resistive indices, suggestive of edema vs acute rejection vs congestion.  - Repeat US 10/9 with continued elevation of RIs.  - LFTs stable.  - T bili trending down.   - Repeat liver US (10/11): elevated RIs.  - Liver US 11/6 to assess for ascites. No arterial flow within the liver allograft concerning for interval thrombosis/occlusion of the arterial system. Severe ascites seen.  - Liver US 11/8: arterial flow seen w/ elevated RIs   - LFTs remain stable. No CTA at this time due to EVA and normal liver function.   - Liver US 11/15 showed "abnormally decreased main hepatic artery peak systolic velocity with slow arterial flow and abnormal tardus parvus waveforms intrahepatically."   -Vascular surgery consulted  - Liver US 11/19 showed no arterial flow on the right and sluggish flow on the left.   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 No further intervention planned at this time as patient's LFTs are stable. Will monitor.  - CT abd/pelvis 11/26 showed: 1. Persistent large volume of ascites. 2. Large right and moderate left pleural effusions with adjacent basilar atelectasis. 3. Anasarca  - IR placed perc drain 11/29 due to suspicion for chyle leak. 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 Octreotide dc'd.  - Fluid also growing enterococcus - treatment with Vanc x 10 days per ID recs, completed.      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  - Tx w/ Entecavir- dose changed to daily 11/26 due to improved renal fx  - 12/3 Hep B surface antigen negative 12/3, surface AB quant 571, and " Hep B DNA <10.  - Continue to monitor, per protocol.     Other ascites    - Paracentesis 10/1, 5L removed, no fluid sent for analysis.  - Pt remains volume overloaded.   - Liver US 11/6 with severe ascites.   - Paracentesis 11/7 with 8.9 L removed. Wbc 39, segs 16%. Gram stain - no organisms seen and no WBC. Cultures NGTD.  - Paracentesis 11/12: 8.6 L removed. 34 wbc, 25% segs, 33 lymphs, 42 monocytes/macrophages. Cultures NGTD  - Paracentesis 11/21 with 4 L removed, neg for infection.  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 Milky appearance, cell count of fluid (51% lymphs), triglycerides 2208, consistent with chyle leak.   - Peritoneal fluid 11/29 growing enterococcus - treatment with Vanc x 10 days (completed 12/9) per ID recs.   - Attempted low fat, high protein diet due to suspicion for chyle leak with no improvement seen in fluid character.  - CT abd/pelvis 12/3 shows small volume abdominal ascites, improved compared to prior, pelvis ascites unchanged from prior, unchanged b/l pleural effusions (large on R, moderate on L) with bibasilar compressive atelectasis and consolidative changes.  - Placed PICC line and started TPN + octreotide 12/3. Improvement in color of fluid seen 12/6 - 12/7 (less milky, more abbie).   - continue TPN.  - Lasix 20 mg iv x 1 12/11, 12/12, 12/13, 12/14     Severe malnutrition    - Dietary consulted.   - Temporal and distal extremity muscle wasting and edema.  - Encourage supplements and to increase nutritional intake.  - Per nephrology, pt to be on low protein diet.  - Prealbumin reviewed.   - Tolerating diet.  Denies n/v.    - Attempted low fat, high protein diet due to suspicion for chyle leak with no improvement seen in fluid character.  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 Had advanced diet with return of milky drainage,   - cont on TPN- pt advanced clear liquid diet 12/12; full liquid diet 12/14. Tolerating well.   - Monitor.     Type 2  diabetes mellitus with diabetic polyneuropathy, with long-term current use of insulin    - Continue home regimen.  - TPN started 12/3 and pt now on insulin drip.  - Endocrine following. Appreciate recs.       Sleeps in sitting position due to orthopnea    - obtain chest xray and echo.  - diamox 500mg iv x 1 12/9/18  - lasix 40 mg iv x 1 12/10/18, another lasix 20 mg iv x 1  - thoracentesis with chest tube placement 12/11/18, removed 12/13. No pneumo noted on CXR.       Hypoalbuminemia    - Continue to monitor.     Sequelae of hyperalimentation    - Monitor.       Pleural effusion    - CT 11/26 showed: Large right and moderate left pleural effusions with adjacent basilar atelectasis.  - Pt sp02 stable on RA, but c/o some orthopnea.  - CT abd/pelvis 12/3 showed unchanged b/l pleural effusions (large on R, moderate on L) with bibasilar compressive atelectasis and consolidative changes.  - Sp02 stable & no signs of respiratory infection.  - repeat echo and chest xray with pleural effusion.  - thoracentesis with chest tube placement 12/11/18 - fluid negative for infection.  - CXR 12/12 w/ improvements noted.  - chest tube removed 12/13- CXR w/ no pneumo. Bilateral pleural effusion still present.     Hypomagnesemia    - Continue PO Mag Oxide.  - Continue to monitor.       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     Hypophosphatemia    - monitor.       Delayed surgical wound healing    - Middle chevron incision opened 11/16 with purulent drainage.  - Packed with Aquacel ag at this time. Growing Klebsiella p.   - CT abd/pelvis 11/17- no signs of infection.  - 11/17: R side of chevron incision w/ 3 small holes draining purulent fluid, opened and packed w/ aquacel.   - Wound care following.  - Wound w/ increased drainage, so wound care placed vac 11/24/18.  Apprec recs. Extending Ertapenem thru 11/28 per ID.    - Abdomen erythematous and indurated RLQ inferior to chevron.  - CT  "abd/pelvis 11/26 - no sign of infection.  - Last wound vac change 12/7 with improvements noted in wound - depth now 0.5cm.   - No longer needs vac per wound care.     Hepatic artery thrombosis of transplanted liver    - Liver US 11/19 showed no arterial flow to R and sluggish flow to L.  - Hepatic angiogram 11/19 confirmed HAT.  - Liver US 12/4 to evaluate for recanalization reviewed. Recanalization possible. Will continue to monitor.  - No further intervention planned at this time. Will continue to monitor.     Other drug-induced pancytopenia    - Decreased plt, wbc, h/h.  - See "leukopenia"  - Continue to monitor.     Thrombocytopenia, unspecified    - Monitor.       Leukopenia    - Stopped Cellcept, Bactrim, and Valcyte 10/31.  - Cellcept half dose resumed 11/6  - Started Atovaquone 11/6.   - CMV PCR noted to be positive at 129. Held off on treatment with valcyte, however weekly CMV PCRs increasing, start treatment dose 12/9/18.  - Cellcept held 11/23 2/2 infection (UTI + wound).         At risk for opportunistic infections    - Valcyte for CMV prophylaxis--> holding (10/31) for leukopenia. Pt will need weekly CMV PCR while discontinued.  - CMV PCR noted to be positive at 129. .  - CMV PCR 11/8: 105  - CMV PCR 11/15 with 89 copies  - CMV PCR 11/23 95 - increased from previous. Continue to hold Valcyte & monitor pt closely.  - CMV PCR 11/29 146.  - CMV PCR 12/6 529 - start treatment dose valcyte 12/9/18  - CMV PCR 12/13 < 35.  - Bactrim for PCP prophylaxis (MWF).--> holding (10/31) for leukopenia.   - Started Atovaquone 11/6.  - Fluconazole for fungal prophylaxis (first dose 10/6). D/c Fluc 11/16 bc pt had been on it for > 30 days post take back.       Long-term use of immunosuppressant medication    - Prograf: stopped 10/29. Resumed 11/2.  --> Will monitor for signs of toxic side effects, check daily troughs, and change meds accordingly.   - Prednisone: stopped 10/29. Started on Hydrocortisone. pred taper " resumed.  - Cellcept: stopped 10/31 for leukopenia. Restarted Cellcept 500 mg bid, 11/6.  - Cellcept held 11/23 2/2 infection.         Prophylactic immunotherapy    - See long term use immunosuppresion.       Physical deconditioning    - PT/OT consulted.  Progressing well w/ therapy.  - Recommend home health PT and rolling walker at discharge (orders placed 11/6).       Anemia requiring transfusions    - Fluctuating H&H.    - Transfusions: 10/14; 10/16; 10/18; 10/19; 10/22; 10/27; 10/29, 10/31, 11/3, 11/21, 11/29, 12/4, 12/13.  - FOBT +  - LDH elevated, Hapto < 10.  - EGD 10/18: ulcerated duodenal mucosa.   - Consulted GI about this ongoing issue and further need of another EGD.  - EGD 11/5 unremarkable.   - Iron studies 11/13:  95, TIBC 107, sat iron 89, transferrin 72.  - Hemolytic anemia workup 11/13: Haptoglobin 132, retic 3.9 .  - 12/13 - transfused 1u PRBC, minimal improvement in H/H.  - No overt signs of bleeding.  - Continue to monitor.         VTE Risk Mitigation (From admission, onward)        Ordered     heparin (porcine) injection 5,000 Units  Every 12 hours      12/06/18 1058     IP VTE HIGH RISK PATIENT  Once      11/05/18 1145     Place sequential compression device  Until discontinued      10/05/18 0709          The patients clinical status was discussed at multidisplinary rounds, involving transplant surgery, transplant medicine, pharmacy, nursing, nutrition, and social work    Discharge Planning: not a candidate for dc at this time.       Kimber Valle, KEEGAN  Liver Transplant  [facility]

## 2018-12-15 NOTE — ASSESSMENT & PLAN NOTE
- Dietary consulted.   - Temporal and distal extremity muscle wasting and edema.  - Encourage supplements and to increase nutritional intake.  - Per nephrology, pt to be on low protein diet.  - Prealbumin reviewed.   - Tolerating diet.  Denies n/v.    - Attempted low fat, high protein diet due to suspicion for chyle leak with no improvement seen in fluid character.  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 Had advanced diet with return of milky drainage,   - cont on TPN- pt advanced clear liquid diet 12/12; full liquid diet 12/14. Tolerating well.   - Monitor.

## 2018-12-15 NOTE — PLAN OF CARE
Problem: Patient Care Overview  Goal: Plan of Care Review  Outcome: Ongoing (interventions implemented as appropriate)  AAOx4. VS stable, afebrile. Denies pain. R UA PICC, CDI. No redness, no swelling. Dressing reinforced. TPN going @ 41 cc/hr. Accuchecks continued achs. Insulin gtt infusing @ 2.1u/hr (transitional). SSI given when necessary. Chevron dressing changed today and covered with gauze. R grenade draining milky yellow fluid--90 cc total today. Contact precautions maintained. Bile cx growing klebsiella. IV lasix 20mg given today. Diet changed to full liquid. 2 BM reported. Up with assistance. Bed low and locked, call bell within reach, side railsx2. In NAD. Will continue to monitor.

## 2018-12-15 NOTE — ASSESSMENT & PLAN NOTE
- Stopped Cellcept, Bactrim, and Valcyte 10/31.  - Cellcept half dose resumed 11/6  - Started Atovaquone 11/6.   - CMV PCR noted to be positive at 129. Held off on treatment with valcyte, however weekly CMV PCRs increasing, start treatment dose 12/9/18.  - Cellcept held 11/23 2/2 infection (UTI + wound).

## 2018-12-15 NOTE — ASSESSMENT & PLAN NOTE
- Prograf: stopped 10/29. Resumed 11/2.  --> Will monitor for signs of toxic side effects, check daily troughs, and change meds accordingly.   - Prednisone: stopped 10/29. Started on Hydrocortisone. pred taper resumed.  - Cellcept: stopped 10/31 for leukopenia. Restarted Cellcept 500 mg bid, 11/6.  - Cellcept held 11/23 2/2 infection.

## 2018-12-15 NOTE — PROGRESS NOTES
"[facility]  Endocrinology  Progress Note    Admit Date: 10/1/2018     Reason for Consult: Management of type 2 DM, Hyperglycemia      Surgical Procedure and Date: Liver transplant 10/5/18, Ex lap 10/6/18     Diabetes diagnosis year: 2013     Home Diabetes Medications:  Lantus 18 units HS and novolog 17 units with meals plus correction scale (150-200 +1)        Lab Results   Component Value Date     HGBA1C 6.8 (H) 08/16/2018        How often checking glucose at home? 3-4   BG readings on regimen: 120-150.  Post prandial readings as high as upper 200s  Hypoglycemia on the regimen? yes, none recently   Missed doses on regimen?  No     Diabetes Complications include:     Diabetic peripheral neuropathy      Complicating diabetes co morbidities:   CIRRHOSIS        HPI:  Patient is a 68 y.o. female with a diagnosis of ESLD, listed for liver transplant with meld 23 who underwent live transplant on 10/5/18.  Back to OR on 10/6/18 for ex lap.  Admitted 10/1/18 for hyponatremia s/p paracentesis.  Personal has known diagnosis of diabetes, endocrine consulted for diabetes management.    Post-operative course complicated by ESBL Klebsiella pneumoniae in urine (in contact isolation), peritransplant ascites, worsening renal function (required temporary dialysis), anemia (multiple blood transfusions), and possible GI bleed.     Interval HPI:   Overnight events: Remains in TSU, NAEON.  BG trending down overnight on intensive insulin protocol with BG monitoring q 2 hours, rates ranging from 1.5-3.2 u/hr.  Eating:   <25% - sugar free clears  Nausea: No  Hypoglycemia and intervention: No  Fever: No  TPN and/or TF: Yes  TPN and rate: 41 cc/hr    /75   Pulse 101   Temp 98.5 °F (36.9 °C) (Oral)   Resp 15   Ht 5' 3" (1.6 m)   Wt 56.4 kg (124 lb 5.4 oz)   LMP  (LMP Unknown)   SpO2 97%   Breastfeeding? No   BMI 22.03 kg/m²      Labs Reviewed and Include    Recent Labs   Lab 12/13/18  0600   * "   CALCIUM 8.2*   ALBUMIN 2.4*   PROT 4.8*      K 4.4   CO2 21*      BUN 44*   CREATININE 1.0   ALKPHOS 192*   ALT 25   AST 24   BILITOT 1.0     Lab Results   Component Value Date    WBC 3.57 (L) 12/13/2018    HGB 7.3 (L) 12/13/2018    HCT 23.6 (L) 12/13/2018    MCV 94 12/13/2018     (L) 12/13/2018     No results for input(s): TSH, FREET4 in the last 168 hours.  Lab Results   Component Value Date    HGBA1C 6.0 (H) 10/04/2018       Nutritional status:   Body mass index is 22.03 kg/m².  Lab Results   Component Value Date    ALBUMIN 2.4 (L) 12/13/2018    ALBUMIN 2.5 (L) 12/12/2018    ALBUMIN 2.9 (L) 12/11/2018     Lab Results   Component Value Date    PREALBUMIN 13 (L) 12/10/2018    PREALBUMIN 15 (L) 11/03/2018    PREALBUMIN 12 (L) 09/17/2018       Estimated Creatinine Clearance: 43.9 mL/min (based on SCr of 1 mg/dL).    Accu-Checks  Recent Labs     12/12/18  1335 12/12/18  1630 12/12/18  1803 12/12/18  2001 12/12/18  2157 12/13/18  0004 12/13/18  0206 12/13/18  0408 12/13/18  0607 12/13/18  0807   POCTGLUCOSE 225* 281* 358* 322* 295* 136* 229* 259* 257* 234*       Current Medications and/or Treatments Impacting Glycemic Control  Immunotherapy:    Immunosuppressants         Stop Route Frequency     tacrolimus capsule 1 mg      -- Oral 2 times daily        Steroids:   Hormones (From admission, onward)    Start     Stop Route Frequency Ordered    12/11/18 2200  octreotide injection 50 mcg      -- IV Every 8 hours 12/11/18 1531    10/17/18 0945  predniSONE tablet 15 mg      10/24 0859 Oral Daily 10/17/18 0943        Pressors:    Autonomic Drugs (From admission, onward)    Start     Stop Route Frequency Ordered    10/06/18 1617  rocuronium (ZEMURON) 10 mg/mL injection     Comments:  Created by cabinet override    10/07 0429   10/06/18 1617        Hyperglycemia/Diabetes Medications:   Antihyperglycemics (From admission, onward)    Start     Stop Route Frequency Ordered    12/10/18 1730  insulin regular  (Humulin R) 100 Units in sodium chloride 0.9% 100 mL infusion     Question:  Insulin Rate Adjustment (DO NOT MODIFY ANSWER)  Answer:  [email or web address]    -- IV Continuous 12/10/18 1626    12/09/18 1554  insulin regular injection 4 Units      -- IV Once 12/09/18 1554    10/07/18 1200  insulin regular (Humulin R) 100 Units in sodium chloride 0.9% 100 mL infusion      10/13 2100 IV Continuous 10/07/18 1055          ASSESSMENT and PLAN    * Liver transplanted    Managed per primary team  Avoid hypoglycemia    Recent Labs   Lab 12/14/18  0400   ALT 25   AST 25   ALKPHOS 171*   BILITOT 1.1*   PROT 4.4*   ALBUMIN 2.2*            Type 2 diabetes mellitus with diabetic polyneuropathy, with long-term current use of insulin    BG goal 140 - 180     Continue IV insulin infusion protocol  Requires intensive q 2 hours BG monitoring while on protocol     ADDENDUM: Diet advanced to full liquids, convert to transition IV insulin infusion at 2.1 u/hr, add Novolog 2-4 units with meals, low dose correction, BG monitoring AC/HS/0200    Discharge planning: TBD         Severe malnutrition    On TPN. May elevate BG     Sequelae of hyperalimentation    On TPN, elevating BG readings       Prophylactic immunotherapy    May increase insulin resistance.          Alteration in skin integrity    Optimize BG control to improve wound healing         Rounding done via translation, language line,  ID #[id number]    Be Willams NP  Endocrinology  [facility]

## 2018-12-15 NOTE — ASSESSMENT & PLAN NOTE
- obtain chest xray and echo.  - diamox 500mg iv x 1 12/9/18  - lasix 40 mg iv x 1 12/10/18, another lasix 20 mg iv x 1  - thoracentesis with chest tube placement 12/11/18, removed 12/13. No pneumo noted on CXR.

## 2018-12-16 LAB
ALBUMIN SERPL BCP-MCNC: 2.5 G/DL
ALP SERPL-CCNC: 188 U/L
ALT SERPL W/O P-5'-P-CCNC: 23 U/L
ANION GAP SERPL CALC-SCNC: 8 MMOL/L
AST SERPL-CCNC: 24 U/L
BASOPHILS # BLD AUTO: 0.02 K/UL
BASOPHILS NFR BLD: 0.6 %
BILIRUB SERPL-MCNC: 1.1 MG/DL
BUN SERPL-MCNC: 36 MG/DL
CALCIUM SERPL-MCNC: 8.2 MG/DL
CHLORIDE SERPL-SCNC: 107 MMOL/L
CO2 SERPL-SCNC: 23 MMOL/L
CREAT SERPL-MCNC: 0.8 MG/DL
DIFFERENTIAL METHOD: ABNORMAL
EOSINOPHIL # BLD AUTO: 0.1 K/UL
EOSINOPHIL NFR BLD: 1.9 %
ERYTHROCYTE [DISTWIDTH] IN BLOOD BY AUTOMATED COUNT: 16.9 %
EST. GFR  (AFRICAN AMERICAN): >60 ML/MIN/1.73 M^2
EST. GFR  (NON AFRICAN AMERICAN): >60 ML/MIN/1.73 M^2
GLUCOSE SERPL-MCNC: 157 MG/DL
HCT VFR BLD AUTO: 25 %
HGB BLD-MCNC: 8 G/DL
IMM GRANULOCYTES # BLD AUTO: 0.09 K/UL
IMM GRANULOCYTES NFR BLD AUTO: 2.5 %
LYMPHOCYTES # BLD AUTO: 0.6 K/UL
LYMPHOCYTES NFR BLD: 15.2 %
MAGNESIUM SERPL-MCNC: 2.5 MG/DL
MCH RBC QN AUTO: 30.1 PG
MCHC RBC AUTO-ENTMCNC: 32 G/DL
MCV RBC AUTO: 94 FL
MONOCYTES # BLD AUTO: 0.2 K/UL
MONOCYTES NFR BLD: 6.6 %
NEUTROPHILS # BLD AUTO: 2.7 K/UL
NEUTROPHILS NFR BLD: 73.2 %
NRBC BLD-RTO: 0 /100 WBC
PHOSPHATE SERPL-MCNC: 3.3 MG/DL
PLATELET # BLD AUTO: 115 K/UL
PMV BLD AUTO: 9.9 FL
POCT GLUCOSE: 125 MG/DL (ref 70–110)
POCT GLUCOSE: 206 MG/DL (ref 70–110)
POCT GLUCOSE: 211 MG/DL (ref 70–110)
POCT GLUCOSE: 310 MG/DL (ref 70–110)
POCT GLUCOSE: 325 MG/DL (ref 70–110)
POTASSIUM SERPL-SCNC: 4.5 MMOL/L
PROT SERPL-MCNC: 4.9 G/DL
RBC # BLD AUTO: 2.66 M/UL
SODIUM SERPL-SCNC: 138 MMOL/L
TACROLIMUS BLD-MCNC: 3.3 NG/ML
WBC # BLD AUTO: 3.62 K/UL

## 2018-12-16 PROCEDURE — 63600175 PHARM REV CODE 636 W HCPCS: Performed by: PHYSICIAN ASSISTANT

## 2018-12-16 PROCEDURE — 80197 ASSAY OF TACROLIMUS: CPT

## 2018-12-16 PROCEDURE — 25000003 PHARM REV CODE 250: Performed by: NURSE PRACTITIONER

## 2018-12-16 PROCEDURE — 63600175 PHARM REV CODE 636 W HCPCS: Performed by: NURSE PRACTITIONER

## 2018-12-16 PROCEDURE — 80053 COMPREHEN METABOLIC PANEL: CPT

## 2018-12-16 PROCEDURE — 84100 ASSAY OF PHOSPHORUS: CPT

## 2018-12-16 PROCEDURE — 25000003 PHARM REV CODE 250: Performed by: TRANSPLANT SURGERY

## 2018-12-16 PROCEDURE — 85025 COMPLETE CBC W/AUTO DIFF WBC: CPT

## 2018-12-16 PROCEDURE — A4216 STERILE WATER/SALINE, 10 ML: HCPCS | Performed by: TRANSPLANT SURGERY

## 2018-12-16 PROCEDURE — A4217 STERILE WATER/SALINE, 500 ML: HCPCS | Performed by: NURSE PRACTITIONER

## 2018-12-16 PROCEDURE — 25000003 PHARM REV CODE 250: Performed by: PHYSICIAN ASSISTANT

## 2018-12-16 PROCEDURE — 99233 SBSQ HOSP IP/OBS HIGH 50: CPT | Mod: 24,,, | Performed by: NURSE PRACTITIONER

## 2018-12-16 PROCEDURE — 20600001 HC STEP DOWN PRIVATE ROOM

## 2018-12-16 PROCEDURE — 83735 ASSAY OF MAGNESIUM: CPT

## 2018-12-16 RX ORDER — TACROLIMUS 1 MG/1
1 CAPSULE ORAL EVERY EVENING
Status: DISCONTINUED | OUTPATIENT
Start: 2018-12-16 | End: 2018-12-17

## 2018-12-16 RX ORDER — TACROLIMUS 1 MG/1
2 CAPSULE ORAL EVERY MORNING
Status: DISCONTINUED | OUTPATIENT
Start: 2018-12-17 | End: 2018-12-17

## 2018-12-16 RX ORDER — FUROSEMIDE 10 MG/ML
20 INJECTION INTRAMUSCULAR; INTRAVENOUS ONCE
Status: COMPLETED | OUTPATIENT
Start: 2018-12-16 | End: 2018-12-16

## 2018-12-16 RX ADMIN — MAGNESIUM OXIDE TAB 400 MG (241.3 MG ELEMENTAL MG) 800 MG: 400 (241.3 MG) TAB at 08:12

## 2018-12-16 RX ADMIN — INSULIN ASPART 1 UNITS: 100 INJECTION, SOLUTION INTRAVENOUS; SUBCUTANEOUS at 02:12

## 2018-12-16 RX ADMIN — PANTOPRAZOLE SODIUM 40 MG: 40 TABLET, DELAYED RELEASE ORAL at 08:12

## 2018-12-16 RX ADMIN — HEPARIN SODIUM 5000 UNITS: 5000 INJECTION, SOLUTION INTRAVENOUS; SUBCUTANEOUS at 08:12

## 2018-12-16 RX ADMIN — TACROLIMUS 1 MG: 1 CAPSULE ORAL at 05:12

## 2018-12-16 RX ADMIN — Medication 10 ML: at 06:12

## 2018-12-16 RX ADMIN — CALCIUM GLUCONATE: 94 INJECTION, SOLUTION INTRAVENOUS at 09:12

## 2018-12-16 RX ADMIN — OCTREOTIDE ACETATE 50 MCG: 100 INJECTION, SOLUTION INTRAVENOUS; SUBCUTANEOUS at 05:12

## 2018-12-16 RX ADMIN — OCTREOTIDE ACETATE 50 MCG: 100 INJECTION, SOLUTION INTRAVENOUS; SUBCUTANEOUS at 08:12

## 2018-12-16 RX ADMIN — ASPIRIN 81 MG CHEWABLE TABLET 81 MG: 81 TABLET CHEWABLE at 08:12

## 2018-12-16 RX ADMIN — VALGANCICLOVIR 450 MG: 450 TABLET, FILM COATED ORAL at 08:12

## 2018-12-16 RX ADMIN — OCTREOTIDE ACETATE 50 MCG: 100 INJECTION, SOLUTION INTRAVENOUS; SUBCUTANEOUS at 03:12

## 2018-12-16 RX ADMIN — SODIUM CHLORIDE 1.6 UNITS/HR: 9 INJECTION, SOLUTION INTRAVENOUS at 05:12

## 2018-12-16 RX ADMIN — INSULIN ASPART 3 UNITS: 100 INJECTION, SOLUTION INTRAVENOUS; SUBCUTANEOUS at 12:12

## 2018-12-16 RX ADMIN — HYDROXYZINE HYDROCHLORIDE 25 MG: 25 TABLET, FILM COATED ORAL at 09:12

## 2018-12-16 RX ADMIN — INSULIN ASPART 2 UNITS: 100 INJECTION, SOLUTION INTRAVENOUS; SUBCUTANEOUS at 12:12

## 2018-12-16 RX ADMIN — HYDROXYZINE HYDROCHLORIDE 25 MG: 25 TABLET, FILM COATED ORAL at 03:12

## 2018-12-16 RX ADMIN — ATOVAQUONE 1500 MG: 750 SUSPENSION ORAL at 08:12

## 2018-12-16 RX ADMIN — INSULIN ASPART 3 UNITS: 100 INJECTION, SOLUTION INTRAVENOUS; SUBCUTANEOUS at 05:12

## 2018-12-16 RX ADMIN — FUROSEMIDE 20 MG: 10 INJECTION, SOLUTION INTRAMUSCULAR; INTRAVENOUS at 11:12

## 2018-12-16 RX ADMIN — ENTECAVIR 0.5 MG: 0.5 TABLET ORAL at 08:12

## 2018-12-16 RX ADMIN — INSULIN ASPART 4 UNITS: 100 INJECTION, SOLUTION INTRAVENOUS; SUBCUTANEOUS at 05:12

## 2018-12-16 RX ADMIN — TACROLIMUS 1 MG: 1 CAPSULE ORAL at 08:12

## 2018-12-16 NOTE — ASSESSMENT & PLAN NOTE
- HBsAg+, Received HBIG (last weekly dose 11/2).  - Tx w/ Entecavir- dose changed to daily 11/26 due to improved renal fx.  - 12/3 Hep B surface antigen negative 12/3, surface AB quant 571, and Hep B DNA <10.  - Continue to monitor, per protocol.

## 2018-12-16 NOTE — ASSESSMENT & PLAN NOTE
- Valcyte for CMV prophylaxis--> holding (10/31) for leukopenia. Pt will need weekly CMV PCR while discontinued.  - CMV PCR noted to be positive at 129.  - CMV PCR 11/8: 105  - CMV PCR 11/15 with 89 copies  - CMV PCR 11/23 95 - increased from previous. Continue to hold Valcyte & monitor pt closely.  - CMV PCR 11/29 146.  - CMV PCR 12/6 529 - start treatment dose valcyte 12/9/18  - CMV PCR 12/13 < 35.  - Bactrim for PCP prophylaxis (MW).--> holding (10/31) for leukopenia.   - Started Atovaquone 11/6.  - Fluconazole for fungal prophylaxis (first dose 10/6). D/c Fluc 11/16 bc pt had been on it for > 30 days post take back.

## 2018-12-16 NOTE — ASSESSMENT & PLAN NOTE
- obtain chest xray and echo.  - thoracentesis with chest tube placement 12/11/18, removed 12/13. No pneumo noted on CXR.

## 2018-12-16 NOTE — PROGRESS NOTES
[facility]  Liver Transplant  Progress Note    Patient Name: Scarlett Reddy  MRN: [id number]  Admission Date: 10/1/2018  Hospital Length of Stay: 75 days  Code Status: Full Code  Primary Care Provider: Primary Doctor No  Post-Operative Day: 72    ORGAN:   LIVER  Disease Etiology: Cirrhosis: Type B and D  Donor Type:    - Brain Death  CDC High Risk:   No  Donor CMV Status:   Donor CMV Status: Positive  Donor HBcAB:   Negative  Donor HCV Status:   Negative  Donor HBV SETH: Negative  Donor HCV SETH: Negative  Whole or Partial: Whole Liver  Biliary Anastomosis: End to End  Arterial Anatomy: Replaced Left Hepatic and Right Hepatic  Subjective:     History of Present Illness:  Ms. Reddy is a 67 y/o female with PMH of ESLD secondary Hep B and D.  Listed for liver transplant with MELD 23.  Paracentesis 10/1 with 5L removed, no fluid sent for analysis.  Morning labs significant for hyponatremia, Na 119.  Pt admitted to LTS for sodium monitoring.        Hospital Course:  Hospital Course: 67 y/o F h/o HBV/delta cirrhosis s/p DBDLT 10/5/18 (CMV D+/R+, steroid induction, MMF/tacro/pred maintenance) with take back on 10/6 2/2 hyperbilirubinema and bilious VALORIE drainage, no leak identified. Post-op course c/b hypercapneic and hypoxic respiratory failure and was reintubated though quickly extubated now doing well. Liver bx (10/6) sig for Zone 3 hemorrhage and collapse, in addition to cholestasis. Transferred from ICU on POD #4. ID consulted to comment on positive diphtheroid culture from ascitic fluid (likely skin colonization) as well as ESBL Klebsiella pneumoniae in urine culture (10/4).  Pt asymptomatic and cell count from ascitic fluid unremarkable. Per ID recs, no need to treat diphtheroids or asymptomatic ESBL Kleb pneumo bacteriuria though pt has had 6 days of therapy which would cover these organisms. Mild peritransplant ascites- repeat US 10/6 with increased arterial resistive indices. Repeat US 10/9  w/ continued elevation in RIs and fluid collections. LFTs trending down nicely.     Acute gradual worsening of renal function s/p OLTx. Creatinine on arrival 0.8 and has been up trending. Nephrology consulted for post-op EVA. Cr cont to trend up but remains to have good UOP. Had stable response to lasiz diuresis.  BUN elevated requiring multiple rounds of dialysis (10/15, 10/17, 10/20, 10/31). No significant improvements in kidney function noted.   In addition, pt H/H cont to fluctuate requiring multiple blood transfusions (10/14, 10/16, 10/18, 10/19, 10/22, 10/27, 10/29).  Pt reported dark stools 10/15 but color has normalized. FOBT+. EGD 10/18 consistent with ulcerated mucosa in duodenum s/p coagulation. Concern for H. Pylori. Started Amoxicillin/levofloxacin (10/19, complete 11/2)). Remains on Protonix 40 mg BID. Biopsy negative for infection and CMV. Will cont close monitoring H/H, transfuse as needed with goal Hb > 7.0. Of note, Urine cx + Klebsiella pneumoniae (10/13). Received 3 day course of ciprofloxacin, dc'd 10/19.   On 10/19 pm pt c/o crushing chest pain. Troponin 0.094, likely 2/2 ischemic stress. EKG NSR.   Liver US 11/6 showed no arterial flow. Paracentesis 11/7 with 8.9 L removed. Wbc 39, segs 16%. Gram stain no organisms seen + no WBC. Pt feels better since having fluid removed but she continues to have worsening abdominal distention & LE edema.Repeat Liver US 11/8 showed arterial flow with increased RIs. Paracentesis 11/12 with 8.6L removed (WBC 34, segs 25%). 11/15 liver u/s showed abnormally decreased main hepatic artery peak systolic velocity with slow arterial flow and abnormal tardus parvus waveforms intrahepatically. Consulted vascular surgery. Repeat liver US 11/19 showed no arterial flow on R and sluggish flow on L. Hepatic angiogram 11/19 confirmed HAT.  Friday 11/16, purulent drainage noted from middle of chevron incision. Area opened and packed. Cx grew klebsiella p. CT scan showed no  signs of infection. On Monday 11/18, it was noted that the right/lateral side of the chevron had 3 small openings draining purulent fluid. Opened the area and packed. Vac placed on 11/24. Wound care following. CT abd/pelvis 11/3 showed small volume abdominal ascites, improved compared to prior, pelvis ascites unchanged from prior, unchanged b/l pleural effusions (large on R, moderate on L) with bibasilar compressive atelectasis and consolidative changes. Patient finished day 7 of 7 of Ertapenem for kleb pneumo ESBL UTI 11/29. Of note, pt grew same bacteria from wound 11/16. Liver US to assess recanalization post HAT 12/4 -> conclusion that pt may recanalize. Will monitor closely with no further intervention planned at this time. Peritoneal fluid 11/29 w/ enterococcus. Per ID, treat with Vanc x 10 days and then repeat peritoneal fluid cx. Chest xray and echo 12/9 with pleural effusions. Right thoracentesis with chest tube placement done 12/11. Pleural fluid w/ 120 WBCs, 8% segs. Cultures NGTD. Chest tube removed, 12/13 w/o any issue. CXR obtained- no pneumo noted.    Interval history: No acute events overnight. LFTs remain stable. Perc drain in place with yellow milky drainage. Continue TPN. Pt tolerating full liquid diet. Monitor.     Scheduled Meds:   aspirin  81 mg Oral Daily    atovaquone  1,500 mg Oral Daily    entecavir  0.5 mg Oral Daily    furosemide  20 mg Intravenous Once    heparin (porcine)  5,000 Units Subcutaneous Q12H    insulin aspart U-100  2-4 Units Subcutaneous TIDWM    magnesium oxide  800 mg Oral BID    metoprolol tartrate  25 mg Oral BID    octreotide  50 mcg Intravenous Q8H    pantoprazole  40 mg Oral BID    sodium chloride 0.9%  10 mL Intravenous Q6H    tacrolimus  1 mg Oral Daily    [START ON 12/17/2018] tacrolimus  2 mg Oral Daily    valGANciclovir  450 mg Oral BID     Continuous Infusions:   insulin (HUMAN R) infusion (adults) 1.6 Units/hr (12/16/18 0735)    TPN ADULT  CENTRAL LINE CUSTOM 41 mL/hr at 12/15/18 2152    TPN ADULT CENTRAL LINE CUSTOM       PRN Meds:sodium chloride, acetaminophen, bisacodyl, calcium carbonate, dextrose 50%, dextrose 50%, glucagon (human recombinant), glucose, glucose, hydrOXYzine HCl, insulin aspart U-100, ondansetron, ondansetron, polyethylene glycol, Flushing PICC Protocol **AND** sodium chloride 0.9% **AND** sodium chloride 0.9%, sodium chloride 0.9%, traMADol    Review of Systems   Constitutional: Positive for activity change and appetite change. Negative for chills and fever.   HENT: Negative for congestion and facial swelling.    Eyes: Negative for pain and discharge.   Respiratory: Negative for cough, chest tightness, shortness of breath and wheezing.    Cardiovascular: Positive for leg swelling. Negative for chest pain and palpitations.   Gastrointestinal: Positive for abdominal distention and abdominal pain. Negative for diarrhea, nausea and vomiting.   Endocrine: Negative.    Genitourinary: Negative for decreased urine volume and difficulty urinating.   Musculoskeletal: Negative for back pain.   Skin: Positive for wound.   Allergic/Immunologic: Positive for immunocompromised state.   Neurological: Positive for weakness.   Hematological: Negative.    Psychiatric/Behavioral: Positive for dysphoric mood and sleep disturbance.     Objective:     Vital Signs (Most Recent):  Temp: 98 °F (36.7 °C) (12/16/18 0701)  Pulse: 94 (12/16/18 0858)  Resp: 14 (12/16/18 0858)  BP: 118/72 (12/16/18 0858)  SpO2: 97 % (12/16/18 0858) Vital Signs (24h Range):  Temp:  [98 °F (36.7 °C)-98.6 °F (37 °C)] 98 °F (36.7 °C)  Pulse:  [] 94  Resp:  [11-21] 14  SpO2:  [97 %-99 %] 97 %  BP: (108-125)/(57-76) 118/72     Weight: 58 kg (127 lb 13.9 oz)  Body mass index is 22.65 kg/m².    Intake/Output - Last 3 Shifts       12/14 0700 - 12/15 0659 12/15 0700 - 12/16 0659 12/16 0700 - 12/17 0659    P.O. 1040 1380     I.V. (mL/kg) 78.8 (1.4) 39.3 (0.7)     Blood       Other  0      TPN 1311.3 329.4     Total Intake(mL/kg) 2430.1 (42.9) 1748.7 (30.1)     Urine (mL/kg/hr) 0 (0) 0 (0)     Emesis/NG output 0 0     Drains 240 250     Other 0 0     Stool 0 0     Blood 0 0     Total Output 240 250     Net +2190.1 +1498.7            Urine Occurrence 9 x 9 x     Stool Occurrence 3 x 5 x     Emesis Occurrence 0 x 0 x           Physical Exam   Constitutional: She is oriented to person, place, and time. She appears well-developed.   Hand and temporal muscle wasting   HENT:   Head: Normocephalic.   Eyes: EOM are normal. Pupils are equal, round, and reactive to light. Scleral icterus is present.   Neck: Normal range of motion. Neck supple. No JVD present.   Cardiovascular: Normal rate, regular rhythm, normal heart sounds and intact distal pulses.   Pulmonary/Chest: Effort normal. She has decreased breath sounds in the right lower field and the left lower field.   Abdominal: Soft. Bowel sounds are normal. She exhibits distension and ascites.       Perc drain in place w/ yellow drainage     Musculoskeletal: Normal range of motion. She exhibits edema.   Neurological: She is alert and oriented to person, place, and time.   Skin: Skin is warm and dry.   Psychiatric: She has a normal mood and affect. Her behavior is normal.   Nursing note and vitals reviewed.      Laboratory:  Immunosuppressants         Stop Route Frequency     tacrolimus capsule 2 mg      -- Oral Daily     tacrolimus capsule 1 mg      -- Oral Daily        CBC:   Recent Labs   Lab 12/16/18  0600   WBC 3.62*   RBC 2.66*   HGB 8.0*   HCT 25.0*   *   MCV 94   MCH 30.1   MCHC 32.0     CMP:   Recent Labs   Lab 12/16/18  0600   *   CALCIUM 8.2*   ALBUMIN 2.5*   PROT 4.9*      K 4.5   CO2 23      BUN 36*   CREATININE 0.8   ALKPHOS 188*   ALT 23   AST 24   BILITOT 1.1*     Labs within the past 24 hours have been reviewed.    Diagnostic Results:  I have personally reviewed all pertinent imaging studies.    Assessment/Plan:  "    * Liver transplanted    - PMHx of ESLD secondary to hep B cirrhosis, now s/p liver transplant on 10/5, with takeback for hyperbilirubinemia and bilious VALORIE output 10/6; no biliary leak identified.   - POD 1 US: increased arterial resistive indices, suggestive of edema vs acute rejection vs congestion.  - Repeat US 10/9 with continued elevation of RIs.  - LFTs stable.  - T bili trending down.   - Repeat liver US (10/11): elevated RIs.  - Liver US 11/6 to assess for ascites. No arterial flow within the liver allograft concerning for interval thrombosis/occlusion of the arterial system. Severe ascites seen.  - Liver US 11/8: arterial flow seen w/ elevated RIs   - LFTs remain stable. No CTA at this time due to EVA and normal liver function.   - Liver US 11/15 showed "abnormally decreased main hepatic artery peak systolic velocity with slow arterial flow and abnormal tardus parvus waveforms intrahepatically."   -Vascular surgery consulted  - Liver US 11/19 showed no arterial flow on the right and sluggish flow on the left.   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 No further intervention planned at this time as patient's LFTs are stable. Will monitor.  - CT abd/pelvis 11/26 showed: 1. Persistent large volume of ascites. 2. Large right and moderate left pleural effusions with adjacent basilar atelectasis. 3. Anasarca  - IR placed perc drain 11/29 due to suspicion for chyle leak. 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 Octreotide dc'd.  - Fluid also growing enterococcus - treatment with Vanc x 10 days per ID recs, completed.      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  - Tx w/ Entecavir- dose changed to daily 11/26 due to improved renal fx.  - 12/3 Hep B surface antigen negative 12/3, surface AB quant 571, and Hep B DNA <10.  - Continue to monitor, per protocol.     Other ascites "    - Paracentesis 10/1, 5L removed, no fluid sent for analysis.  - Pt remains volume overloaded.   - Liver US 11/6 with severe ascites.   - Paracentesis 11/7 with 8.9 L removed. Wbc 39, segs 16%. Gram stain - no organisms seen and no WBC. Cultures NGTD.  - Paracentesis 11/12: 8.6 L removed. 34 wbc, 25% segs, 33 lymphs, 42 monocytes/macrophages. Cultures NGTD  - Paracentesis 11/21 with 4 L removed, neg for infection.  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 Milky appearance, cell count of fluid (51% lymphs), triglycerides 2208, consistent with chyle leak.   - Peritoneal fluid 11/29 growing enterococcus - treatment with Vanc x 10 days (completed 12/9) per ID recs.   - Attempted low fat, high protein diet due to suspicion for chyle leak with no improvement seen in fluid character.  - CT abd/pelvis 12/3 shows small volume abdominal ascites, improved compared to prior, pelvis ascites unchanged from prior, unchanged b/l pleural effusions (large on R, moderate on L) with bibasilar compressive atelectasis and consolidative changes.  - Placed PICC line and started TPN + octreotide 12/3. Improvement in color of fluid seen 12/6 - 12/7 (less milky, more abbie).   - continue TPN.  - Lasix 20 mg iv x 1 12/11, 12/12, 12/13, 12/14.  - Lasix 20 mg IV today.      Severe malnutrition    - Dietary consulted.   - Temporal and distal extremity muscle wasting and edema.  - Encourage supplements and to increase nutritional intake.  - Per nephrology, pt to be on low protein diet.  - Prealbumin reviewed.   - Tolerating diet.  Denies n/v.    - Attempted low fat, high protein diet due to suspicion for chyle leak with no improvement seen in fluid character.  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 Had advanced diet with return of milky drainage,   - cont on TPN - pt advanced clear liquid diet 12/12; full liquid diet 12/14. Tolerating well.      Type 2 diabetes mellitus with diabetic polyneuropathy, with long-term  current use of insulin    - Continue home regimen.  - TPN started 12/3 and pt now on insulin drip.  - Endocrine following. Appreciate recs.       Sleeps in sitting position due to orthopnea    - obtain chest xray and echo.  - thoracentesis with chest tube placement 12/11/18, removed 12/13. No pneumo noted on CXR.       Hypoalbuminemia    - Continue to monitor.     Sequelae of hyperalimentation    - Monitor.       Pleural effusion    - CT 11/26 showed: Large right and moderate left pleural effusions with adjacent basilar atelectasis.  - Pt sp02 stable on RA, but c/o some orthopnea.  - CT abd/pelvis 12/3 showed unchanged b/l pleural effusions (large on R, moderate on L) with bibasilar compressive atelectasis and consolidative changes.  - Sp02 stable & no signs of respiratory infection.  - repeat echo and chest xray with pleural effusion.  - thoracentesis with chest tube placement 12/11/18 - fluid negative for infection.  - CXR 12/12 w/ improvements noted.  - chest tube removed 12/13- CXR w/ no pneumo. Bilateral pleural effusion still present.     Hypomagnesemia    - Continue PO Mag Oxide.  - Continue to monitor.       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     Hypophosphatemia    - monitor.       Delayed surgical wound healing    - Middle chevron incision opened 11/16 with purulent drainage.  - Packed with Aquacel ag at this time. Growing Klebsiella p.   - CT abd/pelvis 11/17- no signs of infection.  - 11/17: R side of chevron incision w/ 3 small holes draining purulent fluid, opened and packed w/ aquacel.   - Wound care following.  - Wound w/ increased drainage, so wound care placed vac 11/24/18.  Apprec recs. Extending Ertapenem thru 11/28 per ID.    - Abdomen erythematous and indurated RLQ inferior to chevron.  - CT abd/pelvis 11/26 - no sign of infection.  - Last wound vac change 12/7 with improvements noted in wound - depth now 0.5cm.   - No longer needs vac per wound  "care.     Hepatic artery thrombosis of transplanted liver    - Liver US 11/19 showed no arterial flow to R and sluggish flow to L.  - Hepatic angiogram 11/19 confirmed HAT.  - Liver US 12/4 to evaluate for recanalization reviewed. Recanalization possible. Will continue to monitor.  - No further intervention planned at this time. Will continue to monitor.     Other drug-induced pancytopenia    - Decreased plt, wbc, h/h.  - See "leukopenia"  - Continue to monitor.     Thrombocytopenia, unspecified    - Monitor.       Leukopenia    - Stopped Cellcept, Bactrim, and Valcyte 10/31.  - Cellcept half dose resumed 11/6  - Started Atovaquone 11/6.   - CMV PCR noted to be positive at 129. Held off on treatment with valcyte, however weekly CMV PCRs increasing, start treatment dose 12/9/18.  - Cellcept held 11/23 2/2 infection (UTI + wound).         At risk for opportunistic infections    - Valcyte for CMV prophylaxis--> holding (10/31) for leukopenia. Pt will need weekly CMV PCR while discontinued.  - CMV PCR noted to be positive at 129.  - CMV PCR 11/8: 105  - CMV PCR 11/15 with 89 copies  - CMV PCR 11/23 95 - increased from previous. Continue to hold Valcyte & monitor pt closely.  - CMV PCR 11/29 146.  - CMV PCR 12/6 529 - start treatment dose valcyte 12/9/18  - CMV PCR 12/13 < 35.  - Bactrim for PCP prophylaxis (MWF).--> holding (10/31) for leukopenia.   - Started Atovaquone 11/6.  - Fluconazole for fungal prophylaxis (first dose 10/6). D/c Fluc 11/16 bc pt had been on it for > 30 days post take back.       Long-term use of immunosuppressant medication    - Prograf: stopped 10/29. Resumed 11/2.  --> Will monitor for signs of toxic side effects, check daily troughs, and change meds accordingly.   - Prednisone: stopped 10/29. Started on Hydrocortisone. pred taper resumed.  - Cellcept: stopped 10/31 for leukopenia. Restarted Cellcept 500 mg bid, 11/6.  - Cellcept held 11/23 2/2 infection.         Prophylactic immunotherapy    - " See long term use immunosuppresion.       Physical deconditioning    - PT/OT consulted.  Progressing well w/ therapy.  - Recommend home health PT and rolling walker at discharge (orders placed 11/6).       Anemia requiring transfusions    - Fluctuating H&H.    - Transfusions: 10/14; 10/16; 10/18; 10/19; 10/22; 10/27; 10/29, 10/31, 11/3, 11/21, 11/29, 12/4, 12/13.  - FOBT +  - LDH elevated, Hapto < 10.  - EGD 10/18: ulcerated duodenal mucosa.   - Consulted GI about this ongoing issue and further need of another EGD.  - EGD 11/5 unremarkable.   - Iron studies 11/13:  95, TIBC 107, sat iron 89, transferrin 72.  - Hemolytic anemia workup 11/13: Haptoglobin 132, retic 3.9 .  - 12/13 - transfused 1u PRBC, minimal improvement in H/H.  - No overt signs of bleeding.  - Continue to monitor.         VTE Risk Mitigation (From admission, onward)        Ordered     heparin (porcine) injection 5,000 Units  Every 12 hours      12/06/18 1058     IP VTE HIGH RISK PATIENT  Once      11/05/18 1145     Place sequential compression device  Until discontinued      10/05/18 0709          The patients clinical status was discussed at multidisplinary rounds, involving transplant surgery, transplant medicine, pharmacy, nursing, nutrition, and social work    Discharge Planning: not a candidate for dc at this time.       Kimber Valle, KEEGAN  Liver Transplant  [facility]

## 2018-12-16 NOTE — ASSESSMENT & PLAN NOTE
- Paracentesis 10/1, 5L removed, no fluid sent for analysis.  - Pt remains volume overloaded.   - Liver US 11/6 with severe ascites.   - Paracentesis 11/7 with 8.9 L removed. Wbc 39, segs 16%. Gram stain - no organisms seen and no WBC. Cultures NGTD.  - Paracentesis 11/12: 8.6 L removed. 34 wbc, 25% segs, 33 lymphs, 42 monocytes/macrophages. Cultures NGTD  - Paracentesis 11/21 with 4 L removed, neg for infection.  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 Milky appearance, cell count of fluid (51% lymphs), triglycerides 2208, consistent with chyle leak.   - Peritoneal fluid 11/29 growing enterococcus - treatment with Vanc x 10 days (completed 12/9) per ID recs.   - Attempted low fat, high protein diet due to suspicion for chyle leak with no improvement seen in fluid character.  - CT abd/pelvis 12/3 shows small volume abdominal ascites, improved compared to prior, pelvis ascites unchanged from prior, unchanged b/l pleural effusions (large on R, moderate on L) with bibasilar compressive atelectasis and consolidative changes.  - Placed PICC line and started TPN + octreotide 12/3. Improvement in color of fluid seen 12/6 - 12/7 (less milky, more abbie).   - continue TPN.  - Lasix 20 mg iv x 1 12/11, 12/12, 12/13, 12/14.  - Lasix 20 mg IV today.

## 2018-12-16 NOTE — SUBJECTIVE & OBJECTIVE
"Interval HPI:   Overnight events: Remains in TSU, RYLEYON.  BG fairly well controlled overnight on transition IV insulin infusion, lowered from 1.8 to 1.5 u/hr per protocol BG of 125.  Rate lowered again at bedtime per protocol to 1.3 u/hr for BG of 106.  Eating:   {Blank single:20614::"100%","50%","<25%","NPO"}  Nausea: {YES NO:39135}  Hypoglycemia and intervention: No  Fever: No  TPN and/or TF: Yes  TPN and rate: 41 cc/hr    /71   Pulse 79   Temp 98.1 °F (36.7 °C) (Oral)   Resp 11   Ht 5' 3" (1.6 m)   Wt 58 kg (127 lb 13.9 oz)   LMP  (LMP Unknown)   SpO2 98%   Breastfeeding? No   BMI 22.65 kg/m²     Labs Reviewed and Include    No results for input(s): GLU, CALCIUM, ALBUMIN, PROT, NA, K, CO2, CL, BUN, CREATININE, ALKPHOS, ALT, AST, BILITOT in the last 24 hours.  Lab Results   Component Value Date    WBC 3.62 (L) 12/16/2018    HGB 8.0 (L) 12/16/2018    HCT 25.0 (L) 12/16/2018    MCV 94 12/16/2018     (L) 12/16/2018     No results for input(s): TSH, FREET4 in the last 168 hours.  Lab Results   Component Value Date    HGBA1C 6.0 (H) 10/04/2018       Nutritional status:   Body mass index is 22.65 kg/m².  Lab Results   Component Value Date    ALBUMIN 2.4 (L) 12/15/2018    ALBUMIN 2.2 (L) 12/14/2018    ALBUMIN 2.4 (L) 12/13/2018     Lab Results   Component Value Date    PREALBUMIN 13 (L) 12/10/2018    PREALBUMIN 15 (L) 11/03/2018    PREALBUMIN 12 (L) 09/17/2018       Estimated Creatinine Clearance: 48.8 mL/min (based on SCr of 0.9 mg/dL).    Accu-Checks  Recent Labs     12/14/18  1001 12/14/18  1153 12/14/18  1419 12/14/18  1824 12/14/18  2237 12/15/18  0312 12/15/18  0817 12/15/18  1158 12/15/18  1642 12/15/18  2202   POCTGLUCOSE 170* 207* 283* 230* 131* 145* 178* 246* 125* 106       Current Medications and/or Treatments Impacting Glycemic Control  Immunotherapy:    Immunosuppressants         Stop Route Frequency     tacrolimus capsule 1 mg      -- Oral 2 times daily        Steroids:   Hormones " (From admission, onward)    Start     Stop Route Frequency Ordered    12/11/18 2200  octreotide injection 50 mcg      -- IV Every 8 hours 12/11/18 1531    10/17/18 0945  predniSONE tablet 15 mg      10/24 0859 Oral Daily 10/17/18 0943        Pressors:    Autonomic Drugs (From admission, onward)    Start     Stop Route Frequency Ordered    10/06/18 1617  rocuronium (ZEMURON) 10 mg/mL injection     Comments:  Created by cabinet override    10/07 0429   10/06/18 1617        Hyperglycemia/Diabetes Medications:   Antihyperglycemics (From admission, onward)    Start     Stop Route Frequency Ordered    12/14/18 1700  insulin regular (Humulin R) 100 Units in sodium chloride 0.9% 100 mL infusion      -- IV Continuous 12/14/18 1555    12/14/18 1700  insulin aspart U-100 pen 2-4 Units      -- SubQ 3 times daily with meals 12/14/18 1555    12/14/18 1655  insulin aspart U-100 pen 0-4 Units      -- SubQ As needed (PRN) 12/14/18 1555    10/07/18 1200  insulin regular (Humulin R) 100 Units in sodium chloride 0.9% 100 mL infusion      10/13 2100 IV Continuous 10/07/18 1055

## 2018-12-16 NOTE — ASSESSMENT & PLAN NOTE
- Dietary consulted.   - Temporal and distal extremity muscle wasting and edema.  - Encourage supplements and to increase nutritional intake.  - Per nephrology, pt to be on low protein diet.  - Prealbumin reviewed.   - Tolerating diet.  Denies n/v.    - Attempted low fat, high protein diet due to suspicion for chyle leak with no improvement seen in fluid character.  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 Had advanced diet with return of milky drainage,   - cont on TPN - pt advanced clear liquid diet 12/12; full liquid diet 12/14. Tolerating well.

## 2018-12-16 NOTE — SUBJECTIVE & OBJECTIVE
Scheduled Meds:   aspirin  81 mg Oral Daily    atovaquone  1,500 mg Oral Daily    entecavir  0.5 mg Oral Daily    furosemide  20 mg Intravenous Once    heparin (porcine)  5,000 Units Subcutaneous Q12H    insulin aspart U-100  2-4 Units Subcutaneous TIDWM    magnesium oxide  800 mg Oral BID    metoprolol tartrate  25 mg Oral BID    octreotide  50 mcg Intravenous Q8H    pantoprazole  40 mg Oral BID    sodium chloride 0.9%  10 mL Intravenous Q6H    tacrolimus  1 mg Oral Daily    [START ON 12/17/2018] tacrolimus  2 mg Oral Daily    valGANciclovir  450 mg Oral BID     Continuous Infusions:   insulin (HUMAN R) infusion (adults) 1.6 Units/hr (12/16/18 0735)    TPN ADULT CENTRAL LINE CUSTOM 41 mL/hr at 12/15/18 2152    TPN ADULT CENTRAL LINE CUSTOM       PRN Meds:sodium chloride, acetaminophen, bisacodyl, calcium carbonate, dextrose 50%, dextrose 50%, glucagon (human recombinant), glucose, glucose, hydrOXYzine HCl, insulin aspart U-100, ondansetron, ondansetron, polyethylene glycol, Flushing PICC Protocol **AND** sodium chloride 0.9% **AND** sodium chloride 0.9%, sodium chloride 0.9%, traMADol    Review of Systems   Constitutional: Positive for activity change and appetite change. Negative for chills and fever.   HENT: Negative for congestion and facial swelling.    Eyes: Negative for pain and discharge.   Respiratory: Negative for cough, chest tightness, shortness of breath and wheezing.    Cardiovascular: Positive for leg swelling. Negative for chest pain and palpitations.   Gastrointestinal: Positive for abdominal distention and abdominal pain. Negative for diarrhea, nausea and vomiting.   Endocrine: Negative.    Genitourinary: Negative for decreased urine volume and difficulty urinating.   Musculoskeletal: Negative for back pain.   Skin: Positive for wound.   Allergic/Immunologic: Positive for immunocompromised state.   Neurological: Positive for weakness.   Hematological: Negative.     Psychiatric/Behavioral: Positive for dysphoric mood and sleep disturbance.     Objective:     Vital Signs (Most Recent):  Temp: 98 °F (36.7 °C) (12/16/18 0701)  Pulse: 94 (12/16/18 0858)  Resp: 14 (12/16/18 0858)  BP: 118/72 (12/16/18 0858)  SpO2: 97 % (12/16/18 0858) Vital Signs (24h Range):  Temp:  [98 °F (36.7 °C)-98.6 °F (37 °C)] 98 °F (36.7 °C)  Pulse:  [] 94  Resp:  [11-21] 14  SpO2:  [97 %-99 %] 97 %  BP: (108-125)/(57-76) 118/72     Weight: 58 kg (127 lb 13.9 oz)  Body mass index is 22.65 kg/m².    Intake/Output - Last 3 Shifts       12/14 0700 - 12/15 0659 12/15 0700 - 12/16 0659 12/16 0700 - 12/17 0659    P.O. 1040 1380     I.V. (mL/kg) 78.8 (1.4) 39.3 (0.7)     Blood       Other 0      TPN 1311.3 329.4     Total Intake(mL/kg) 2430.1 (42.9) 1748.7 (30.1)     Urine (mL/kg/hr) 0 (0) 0 (0)     Emesis/NG output 0 0     Drains 240 250     Other 0 0     Stool 0 0     Blood 0 0     Total Output 240 250     Net +2190.1 +1498.7            Urine Occurrence 9 x 9 x     Stool Occurrence 3 x 5 x     Emesis Occurrence 0 x 0 x           Physical Exam   Constitutional: She is oriented to person, place, and time. She appears well-developed.   Hand and temporal muscle wasting   HENT:   Head: Normocephalic.   Eyes: EOM are normal. Pupils are equal, round, and reactive to light. Scleral icterus is present.   Neck: Normal range of motion. Neck supple. No JVD present.   Cardiovascular: Normal rate, regular rhythm, normal heart sounds and intact distal pulses.   Pulmonary/Chest: Effort normal. She has decreased breath sounds in the right lower field and the left lower field.   Abdominal: Soft. Bowel sounds are normal. She exhibits distension and ascites.       Perc drain in place w/ yellow drainage     Musculoskeletal: Normal range of motion. She exhibits edema.   Neurological: She is alert and oriented to person, place, and time.   Skin: Skin is warm and dry.   Psychiatric: She has a normal mood and affect. Her behavior  is normal.   Nursing note and vitals reviewed.      Laboratory:  Immunosuppressants         Stop Route Frequency     tacrolimus capsule 2 mg      -- Oral Daily     tacrolimus capsule 1 mg      -- Oral Daily        CBC:   Recent Labs   Lab 12/16/18  0600   WBC 3.62*   RBC 2.66*   HGB 8.0*   HCT 25.0*   *   MCV 94   MCH 30.1   MCHC 32.0     CMP:   Recent Labs   Lab 12/16/18  0600   *   CALCIUM 8.2*   ALBUMIN 2.5*   PROT 4.9*      K 4.5   CO2 23      BUN 36*   CREATININE 0.8   ALKPHOS 188*   ALT 23   AST 24   BILITOT 1.1*     Labs within the past 24 hours have been reviewed.    Diagnostic Results:  I have personally reviewed all pertinent imaging studies.

## 2018-12-16 NOTE — ASSESSMENT & PLAN NOTE
BG goal 140 - 180     Transition IV insulin infusion at 1.4 u/hr  Increase Novolog to 3-5 units with meals  Low dose correction  BG monitoring /HS/0200    Discharge planning: [place]

## 2018-12-16 NOTE — PLAN OF CARE
Pt AAOx4, VSS, afebrile. Insulin gtt decreased to 1.3u/hr, TPN continues to be infused at 41ml/hr.  PRN tylenol given x1.  R grenade continues to drain milky yellow fluid, small fluid leakage at site, changed dressing.  R  picc CDI; chevron dressing CDI.  Pt tolerated sleeping supine in bed overnight.  Fall risk precautions initiated.  Pt in lowest bed position setting, lighting adjusted, pt to wear nonskid socks when ambulating, side rails up x2.  Pt remain free from falls during shift. Spouse remains at bedside. Call light within reach. Will continue to monitor.

## 2018-12-17 LAB
ALBUMIN SERPL BCP-MCNC: 2.3 G/DL
ALP SERPL-CCNC: 174 U/L
ALT SERPL W/O P-5'-P-CCNC: 21 U/L
ANION GAP SERPL CALC-SCNC: 6 MMOL/L
AST SERPL-CCNC: 20 U/L
BASOPHILS # BLD AUTO: 0.01 K/UL
BASOPHILS NFR BLD: 0.4 %
BILIRUB SERPL-MCNC: 1 MG/DL
BUN SERPL-MCNC: 33 MG/DL
CALCIUM SERPL-MCNC: 8 MG/DL
CHLORIDE SERPL-SCNC: 109 MMOL/L
CO2 SERPL-SCNC: 25 MMOL/L
CREAT SERPL-MCNC: 0.9 MG/DL
DIFFERENTIAL METHOD: ABNORMAL
EOSINOPHIL # BLD AUTO: 0.1 K/UL
EOSINOPHIL NFR BLD: 2.2 %
ERYTHROCYTE [DISTWIDTH] IN BLOOD BY AUTOMATED COUNT: 17.4 %
EST. GFR  (AFRICAN AMERICAN): >60 ML/MIN/1.73 M^2
EST. GFR  (NON AFRICAN AMERICAN): >60 ML/MIN/1.73 M^2
GLUCOSE SERPL-MCNC: 167 MG/DL
HCT VFR BLD AUTO: 24.3 %
HGB BLD-MCNC: 7.7 G/DL
IMM GRANULOCYTES # BLD AUTO: 0.08 K/UL
IMM GRANULOCYTES NFR BLD AUTO: 2.9 %
LYMPHOCYTES # BLD AUTO: 0.5 K/UL
LYMPHOCYTES NFR BLD: 18.7 %
MAGNESIUM SERPL-MCNC: 2.2 MG/DL
MCH RBC QN AUTO: 29.6 PG
MCHC RBC AUTO-ENTMCNC: 31.7 G/DL
MCV RBC AUTO: 94 FL
MONOCYTES # BLD AUTO: 0.1 K/UL
MONOCYTES NFR BLD: 5 %
NEUTROPHILS # BLD AUTO: 2 K/UL
NEUTROPHILS NFR BLD: 70.8 %
NRBC BLD-RTO: 0 /100 WBC
PHOSPHATE SERPL-MCNC: 2.6 MG/DL
PLATELET # BLD AUTO: 109 K/UL
PMV BLD AUTO: 10.1 FL
POCT GLUCOSE: 177 MG/DL (ref 70–110)
POCT GLUCOSE: 199 MG/DL (ref 70–110)
POCT GLUCOSE: 212 MG/DL (ref 70–110)
POCT GLUCOSE: 223 MG/DL (ref 70–110)
POCT GLUCOSE: 238 MG/DL (ref 70–110)
POTASSIUM SERPL-SCNC: 4.6 MMOL/L
PROT SERPL-MCNC: 4.6 G/DL
RBC # BLD AUTO: 2.6 M/UL
SODIUM SERPL-SCNC: 140 MMOL/L
TACROLIMUS BLD-MCNC: 3.1 NG/ML
WBC # BLD AUTO: 2.78 K/UL

## 2018-12-17 PROCEDURE — 63600175 PHARM REV CODE 636 W HCPCS: Performed by: NURSE PRACTITIONER

## 2018-12-17 PROCEDURE — 25000003 PHARM REV CODE 250: Performed by: TRANSPLANT SURGERY

## 2018-12-17 PROCEDURE — 99232 SBSQ HOSP IP/OBS MODERATE 35: CPT | Mod: ,,, | Performed by: NURSE PRACTITIONER

## 2018-12-17 PROCEDURE — A4216 STERILE WATER/SALINE, 10 ML: HCPCS | Performed by: TRANSPLANT SURGERY

## 2018-12-17 PROCEDURE — A4217 STERILE WATER/SALINE, 500 ML: HCPCS | Performed by: PHYSICIAN ASSISTANT

## 2018-12-17 PROCEDURE — 80053 COMPREHEN METABOLIC PANEL: CPT

## 2018-12-17 PROCEDURE — 80197 ASSAY OF TACROLIMUS: CPT

## 2018-12-17 PROCEDURE — 83735 ASSAY OF MAGNESIUM: CPT

## 2018-12-17 PROCEDURE — 84100 ASSAY OF PHOSPHORUS: CPT

## 2018-12-17 PROCEDURE — 25000003 PHARM REV CODE 250: Performed by: PHYSICIAN ASSISTANT

## 2018-12-17 PROCEDURE — 63600175 PHARM REV CODE 636 W HCPCS: Performed by: PHYSICIAN ASSISTANT

## 2018-12-17 PROCEDURE — 94664 DEMO&/EVAL PT USE INHALER: CPT

## 2018-12-17 PROCEDURE — P9047 ALBUMIN (HUMAN), 25%, 50ML: HCPCS | Mod: JG | Performed by: PHYSICIAN ASSISTANT

## 2018-12-17 PROCEDURE — 20600001 HC STEP DOWN PRIVATE ROOM

## 2018-12-17 PROCEDURE — 99900035 HC TECH TIME PER 15 MIN (STAT)

## 2018-12-17 PROCEDURE — 99233 SBSQ HOSP IP/OBS HIGH 50: CPT | Mod: 24,,, | Performed by: PHYSICIAN ASSISTANT

## 2018-12-17 PROCEDURE — 25000003 PHARM REV CODE 250: Performed by: NURSE PRACTITIONER

## 2018-12-17 PROCEDURE — 63600175 PHARM REV CODE 636 W HCPCS: Performed by: TRANSPLANT SURGERY

## 2018-12-17 PROCEDURE — 85025 COMPLETE CBC W/AUTO DIFF WBC: CPT

## 2018-12-17 RX ORDER — LIDOCAINE HYDROCHLORIDE 10 MG/ML
INJECTION INFILTRATION; PERINEURAL
Status: DISPENSED
Start: 2018-12-17 | End: 2018-12-17

## 2018-12-17 RX ORDER — INSULIN ASPART 100 [IU]/ML
3-5 INJECTION, SOLUTION INTRAVENOUS; SUBCUTANEOUS
Status: DISCONTINUED | OUTPATIENT
Start: 2018-12-17 | End: 2018-12-18

## 2018-12-17 RX ORDER — TACROLIMUS 1 MG/1
2 CAPSULE ORAL 2 TIMES DAILY
Status: DISCONTINUED | OUTPATIENT
Start: 2018-12-17 | End: 2018-12-21 | Stop reason: HOSPADM

## 2018-12-17 RX ORDER — FUROSEMIDE 10 MG/ML
20 INJECTION INTRAMUSCULAR; INTRAVENOUS ONCE
Status: COMPLETED | OUTPATIENT
Start: 2018-12-17 | End: 2018-12-17

## 2018-12-17 RX ORDER — ALBUMIN HUMAN 250 G/1000ML
25 SOLUTION INTRAVENOUS ONCE
Status: COMPLETED | OUTPATIENT
Start: 2018-12-17 | End: 2018-12-17

## 2018-12-17 RX ADMIN — HEPARIN SODIUM 5000 UNITS: 5000 INJECTION, SOLUTION INTRAVENOUS; SUBCUTANEOUS at 08:12

## 2018-12-17 RX ADMIN — CALCIUM GLUCONATE: 94 INJECTION, SOLUTION INTRAVENOUS at 08:12

## 2018-12-17 RX ADMIN — INSULIN ASPART 2 UNITS: 100 INJECTION, SOLUTION INTRAVENOUS; SUBCUTANEOUS at 08:12

## 2018-12-17 RX ADMIN — ALBUMIN HUMAN 25 G: 0.25 SOLUTION INTRAVENOUS at 02:12

## 2018-12-17 RX ADMIN — INSULIN ASPART 4 UNITS: 100 INJECTION, SOLUTION INTRAVENOUS; SUBCUTANEOUS at 08:12

## 2018-12-17 RX ADMIN — TACROLIMUS 2 MG: 1 CAPSULE ORAL at 08:12

## 2018-12-17 RX ADMIN — ATOVAQUONE 1500 MG: 750 SUSPENSION ORAL at 08:12

## 2018-12-17 RX ADMIN — INSULIN ASPART 5 UNITS: 100 INJECTION, SOLUTION INTRAVENOUS; SUBCUTANEOUS at 01:12

## 2018-12-17 RX ADMIN — VALGANCICLOVIR 450 MG: 450 TABLET, FILM COATED ORAL at 08:12

## 2018-12-17 RX ADMIN — TACROLIMUS 2 MG: 1 CAPSULE ORAL at 05:12

## 2018-12-17 RX ADMIN — FUROSEMIDE 20 MG: 10 INJECTION, SOLUTION INTRAMUSCULAR; INTRAVENOUS at 10:12

## 2018-12-17 RX ADMIN — OCTREOTIDE ACETATE 50 MCG: 100 INJECTION, SOLUTION INTRAVENOUS; SUBCUTANEOUS at 08:12

## 2018-12-17 RX ADMIN — FUROSEMIDE 20 MG: 10 INJECTION, SOLUTION INTRAMUSCULAR; INTRAVENOUS at 05:12

## 2018-12-17 RX ADMIN — ASPIRIN 81 MG CHEWABLE TABLET 81 MG: 81 TABLET CHEWABLE at 08:12

## 2018-12-17 RX ADMIN — INSULIN ASPART 1 UNITS: 100 INJECTION, SOLUTION INTRAVENOUS; SUBCUTANEOUS at 06:12

## 2018-12-17 RX ADMIN — INSULIN ASPART 2 UNITS: 100 INJECTION, SOLUTION INTRAVENOUS; SUBCUTANEOUS at 10:12

## 2018-12-17 RX ADMIN — INSULIN ASPART 1 UNITS: 100 INJECTION, SOLUTION INTRAVENOUS; SUBCUTANEOUS at 01:12

## 2018-12-17 RX ADMIN — INSULIN ASPART 5 UNITS: 100 INJECTION, SOLUTION INTRAVENOUS; SUBCUTANEOUS at 06:12

## 2018-12-17 RX ADMIN — Medication 10 ML: at 12:12

## 2018-12-17 RX ADMIN — PANTOPRAZOLE SODIUM 40 MG: 40 TABLET, DELAYED RELEASE ORAL at 08:12

## 2018-12-17 RX ADMIN — ENTECAVIR 0.5 MG: 0.5 TABLET ORAL at 08:12

## 2018-12-17 RX ADMIN — MAGNESIUM OXIDE TAB 400 MG (241.3 MG ELEMENTAL MG) 800 MG: 400 (241.3 MG) TAB at 08:12

## 2018-12-17 RX ADMIN — Medication 10 ML: at 06:12

## 2018-12-17 RX ADMIN — OCTREOTIDE ACETATE 50 MCG: 100 INJECTION, SOLUTION INTRAVENOUS; SUBCUTANEOUS at 05:12

## 2018-12-17 RX ADMIN — SODIUM CHLORIDE 1.4 UNITS/HR: 9 INJECTION, SOLUTION INTRAVENOUS at 06:12

## 2018-12-17 RX ADMIN — OCTREOTIDE ACETATE 50 MCG: 100 INJECTION, SOLUTION INTRAVENOUS; SUBCUTANEOUS at 01:12

## 2018-12-17 NOTE — PROGRESS NOTES
"[facility]  Adult Nutrition  Progress Note    SUMMARY       Recommendations    Recommendation/Intervention: 1. Continue Regular diet. 2. Recommend Boost Glucose Control TID. 3. Continue TPN at current rate until pt's po intake provides> 50% kcal needs. 3. Calorie count recommended and started.  Goals: 1. Pt to meet % EEN and EPN during stay.  Nutrition Goal Status: progressing towards goal  Communication of RD Recs: reviewed with RN(POC)    Reason for Assessment    Reason For Assessment: RD follow-up  Diagnosis: transplant/postoperative complications(s/p OLTx 10/5)  Relevant Medical History: decompensated cirrhosis due to Hep B c/b ascites, DM  Interdisciplinary Rounds: attended  General Information Comments: Pt s/p OLTx 10/5 with multiple complications including worsening renal fx, chylous ascites and s/p thoracentesis 12/11 for pleural effusions; chest tube now removed. Pt indicated with severe malnutrition per NFPE 12/12. Pt was on Clear Liquid diet, then advanced to full liquid, now on Regular diet. Per RN, low fat/low sugar diet was d/c'd because it did not resolve the chylous ascities. Pt now with very good appetite and ate a variety of foods this morning, including pasta with chicken, yogurt, salad, chocolate ice cream. Requested caretaker to start recording pt's intake (calorie count).  Nutrition Discharge Planning: Adequate po intake    Nutrition/Diet History    Patient Reported Diet/Restrictions/Preferences: diabetic diet, low salt(Cultural preferences)  Food Preferences: Cultural preferences Noted  Supplemental Drinks or Food Habits: Boost Plus, Ensure Plus(Strawberry)  Food Allergies: NKFA  Factors Affecting Nutritional Intake: clear liquid diet    Anthropometrics    Temp: 98.4 °F (36.9 °C)  Height Method: Stated  Height: 5' 3" (160 cm)  Height (inches): 63 in  Weight Method: Bed Scale  Weight: 58.8 kg (129 lb 10.1 oz)  Weight (lb): 129.63 lb  Ideal Body Weight (IBW), Female: " 115 lb  % Ideal Body Weight, Female (lb): 105.63 lb  BMI (Calculated): 21.6  BMI Grade: 25 - 29.9 - overweight  Weight Loss: unintentional  Weight Loss Since Admission: 16 lb 0.4 oz       Lab/Procedures/Meds    Pertinent Labs Reviewed: reviewed  Pertinent Labs Comments: A1c 6%, Glu 167, H/H 7.7/24.3, BUN 33, Phos 2.6, Alk phos 174, Alb 2.3  Pertinent Medications Reviewed: reviewed  Pertinent Medications Comments: lasix, aspart, pantoprazole, prograf, regular insulin drip, IV NS    Physical Findings/Assessment    (RETIRED) Overall Physical Appearance: loss of subcutaneous fat, loss of muscle mass  RETIRED Oral/Mouth Cavity: tooth/teeth missing  (RETIRED) Skin: edema, intact, skin tear(Skin Tear Lower Abdomen)    Estimated/Assessed Needs    Weight Used For Calorie Calculations: 55.6 kg (122 lb 9.2 oz)  Energy Calorie Requirements (kcal): 1668  Energy Need Method: Kcal/kg(30 kcal/kg)  Protein Requirements: 66-78(1.2-1.4 gm/kg)  Weight Used For Protein Calculations: 55.6 kg (122 lb 9.2 oz)  Fluid Requirements (mL): 1 mL/kcal or per MD  Estimated Fluid Requirement Method: RDA Method  RDA Method (mL): 1668     Nutrition Prescription Ordered    Current Diet Order: Regular diet  Nutrition Order Comments: Fluid - 1500mL  Current Nutrition Support Formula Ordered: (60gm AA 350gm Dextrose)  Current Nutrition Support Rate Ordered: (none)  Current Nutrition Support Frequency Ordered: (none)  Oral Nutrition Supplement: -    Evaluation of Received Nutrient/Fluid Intake    Parenteral Calories (kcal): 1430  Parenteral Protein (gm): 60  Parenteral Fluid (mL): 1200  Lipid Calories (kcals): 0 kcals  GIR (Glucose Infusion Rate) (mg/kg/min): 4.4 mg/kg/min  % Kcal Needs: 86  % Protein Needs: 88  I/O: +12.6L since admissoin  Energy Calories Required: meeting needs  Protein Required: not meeting needs  Comments: LBM 12/16  Tolerance: tolerating  % Intake of Estimated Energy Needs: 25 - 50 %  % Meal Intake: 25 - 50 %    Nutrition  Risk    Level of Risk/Frequency of Follow-up: high     Assessment and Plan   Nutrition Problem  Severe Malnutrition in the context of Chronic Illness/Injury     Related to (etiology):  ESLD     Signs and Symptoms (as evidenced by):  Energy Intake: <75% of estimated energy requirement for 2 months  Body Fat Depletion: Severe depletion of orbitals and triceps   Muscle Mass Depletion: Severe depletion of temples, clavicle region and lower extremities as well as protruding acromion process   Weight Loss: 15% x 1 year    Monitor and Evaluation    Food and Nutrient Intake: energy intake, food and beverage intake, parenteral nutrition intake  Food and Nutrient Adminstration: diet order, enteral and parenteral nutrition administration  Knowledge/Beliefs/Attitudes: food and nutrition knowledge/skill  Physical Activity and Function: nutrition-related ADLs and IADLs  Anthropometric Measurements: weight change, weight  Biochemical Data, Medical Tests and Procedures: electrolyte and renal panel, gastrointestinal profile, glucose/endocrine profile, inflammatory profile, lipid profile  Nutrition-Focused Physical Findings: overall appearance, extremities, muscles and bones, head and eyes, skin     Nutrition Follow-Up    RD Follow-up?: Yes

## 2018-12-17 NOTE — PROGRESS NOTES
"[facility]  Endocrinology  Progress Note    Admit Date: 10/1/2018     Reason for Consult: Management of type 2 DM, Hyperglycemia      Surgical Procedure and Date: Liver transplant 10/5/18, Ex lap 10/6/18     Diabetes diagnosis year:      Home Diabetes Medications:  Lantus 18 units HS and novolog 17 units with meals plus correction scale (150-200 +1)        Lab Results   Component Value Date     HGBA1C 6.8 (H) 2018        How often checking glucose at home? 3-4   BG readings on regimen: 120-150.  Post prandial readings as high as upper 200s  Hypoglycemia on the regimen? yes, none recently   Missed doses on regimen?  No     Diabetes Complications include:     Diabetic peripheral neuropathy      Complicating diabetes co morbidities:   CIRRHOSIS        HPI:  Patient is a 68 y.o. female with a diagnosis of ESLD, listed for liver transplant with meld 23 who underwent live transplant on 10/5/18.  Back to OR on 10/6/18 for ex lap.  Admitted 10/1/18 for hyponatremia s/p paracentesis.  Personal has known diagnosis of diabetes, endocrine consulted for diabetes management.    Post-operative course complicated by ESBL Klebsiella pneumoniae in urine (in contact isolation), peritransplant ascites, worsening renal function (required temporary dialysis), anemia (multiple blood transfusions), and possible GI bleed.     Interval HPI:   Overnight events: Remains in TSU, NAEON.  BG elevated yesterday but well controlled overnight on transition IV insulin infusion.  Eatin-75%  Nausea: No  Hypoglycemia and intervention: No  Fever: No  TPN and/or TF: Yes  TPN and rate: 41 cc/hr    /63   Pulse 103   Temp 97.9 °F (36.6 °C) (Oral)   Resp 14   Ht 5' 3" (1.6 m)   Wt 58.8 kg (129 lb 10.1 oz)   LMP  (LMP Unknown)   SpO2 97%   Breastfeeding? No   BMI 22.96 kg/m²      Labs Reviewed and Include    No results for input(s): GLU, CALCIUM, ALBUMIN, PROT, NA, K, CO2, CL, BUN, CREATININE, ALKPHOS, ALT, " AST, BILITOT in the last 24 hours.  Lab Results   Component Value Date    WBC 2.78 (L) 12/17/2018    HGB 7.7 (L) 12/17/2018    HCT 24.3 (L) 12/17/2018    MCV 94 12/17/2018     (L) 12/17/2018     No results for input(s): TSH, FREET4 in the last 168 hours.  Lab Results   Component Value Date    HGBA1C 6.0 (H) 10/04/2018       Nutritional status:   Body mass index is 22.96 kg/m².  Lab Results   Component Value Date    ALBUMIN 2.5 (L) 12/16/2018    ALBUMIN 2.4 (L) 12/15/2018    ALBUMIN 2.2 (L) 12/14/2018     Lab Results   Component Value Date    PREALBUMIN 13 (L) 12/10/2018    PREALBUMIN 15 (L) 11/03/2018    PREALBUMIN 12 (L) 09/17/2018       Estimated Creatinine Clearance: 54.9 mL/min (based on SCr of 0.8 mg/dL).    Accu-Checks  Recent Labs     12/15/18  0817 12/15/18  1158 12/15/18  1642 12/15/18  2202 12/16/18  0218 12/16/18  0734 12/16/18  1219 12/16/18  1721 12/16/18  2101 12/17/18  0207   POCTGLUCOSE 178* 246* 125* 106 206* 211* 310* 325* 125* 177*       Current Medications and/or Treatments Impacting Glycemic Control  Immunotherapy:    Immunosuppressants         Stop Route Frequency     tacrolimus capsule 2 mg      -- Oral Daily     tacrolimus capsule 1 mg      -- Oral Daily        Steroids:   Hormones (From admission, onward)    Start     Stop Route Frequency Ordered    12/11/18 2200  octreotide injection 50 mcg      -- IV Every 8 hours 12/11/18 1531    10/17/18 0945  predniSONE tablet 15 mg      10/24 0859 Oral Daily 10/17/18 0943        Pressors:    Autonomic Drugs (From admission, onward)    Start     Stop Route Frequency Ordered    10/06/18 1617  rocuronium (ZEMURON) 10 mg/mL injection     Comments:  Created by cabinet override    10/07 0429   10/06/18 1617        Hyperglycemia/Diabetes Medications:   Antihyperglycemics (From admission, onward)    Start     Stop Route Frequency Ordered    12/14/18 1700  insulin regular (Humulin R) 100 Units in sodium chloride 0.9% 100 mL infusion      -- IV Continuous  12/14/18 1555    12/14/18 1700  insulin aspart U-100 pen 2-4 Units      -- SubQ 3 times daily with meals 12/14/18 1555    12/14/18 1655  insulin aspart U-100 pen 0-4 Units      -- SubQ As needed (PRN) 12/14/18 1555    10/07/18 1200  insulin regular (Humulin R) 100 Units in sodium chloride 0.9% 100 mL infusion      10/13 2100 IV Continuous 10/07/18 1055          ASSESSMENT and PLAN    * Liver transplanted    Managed per primary team  Avoid hypoglycemia    Recent Labs   Lab 12/17/18  0600   ALT 21   AST 20   ALKPHOS 174*   BILITOT 1.0   PROT 4.6*   ALBUMIN 2.3*            Type 2 diabetes mellitus with diabetic polyneuropathy, with long-term current use of insulin    BG goal 140 - 180     Transition IV insulin infusion at 1.4 u/hr  Increase Novolog to 3-5 units with meals  Low dose correction  BG monitoring AC/HS/0200    Discharge planning: TBD         Severe malnutrition    On TPN. May elevate BG     Sequelae of hyperalimentation    On TPN, elevating BG readings       Prophylactic immunotherapy    May increase insulin resistance.          Alteration in skin integrity    Optimize BG control to improve wound healing           Be Willams, KEEGAN  Endocrinology  [facility]

## 2018-12-17 NOTE — PT/OT/SLP PROGRESS
Physical Therapy      Patient Name:  Scarlett Reddy   MRN:  [id number]    Patient not seen today secondary to (Pt seen ambulating in hallway with her  in AM. In PM, RN reports VALORIE drain to suction at this time.). PT unable to return for 3rd attempt. Discussed with RN, who states pt has ambulated in hallway with  at least twice thus far this date. Will follow-up at next scheduled session as able.     Radha Nguyen, PT, DPT   12/17/2018  [phone]

## 2018-12-17 NOTE — PLAN OF CARE
Problem: Patient Care Overview  Goal: Plan of Care Review  Nutrition Problem  Severe Malnutrition in the context of Chronic Illness/Injury     Related to (etiology):  ESLD     Signs and Symptoms (as evidenced by):  Energy Intake: <75% of estimated energy requirement for 2 months  Body Fat Depletion: Severe depletion of orbitals and triceps   Muscle Mass Depletion: Severe depletion of temples, clavicle region and lower extremities as well as protruding acromion process   Weight Loss: 15% x 1 year    Recommendations     Recommendation/Intervention: 1. Continue Regular diet. 2. Recommend Boost Glucose Control TID. 3. Continue TPN at current rate until pt's po intake provides> 50% kcal needs. 3. Calorie count recommended and started.  Goals: 1. Pt to meet % EEN and EPN during stay.

## 2018-12-17 NOTE — SUBJECTIVE & OBJECTIVE
"Interval HPI:   Overnight events: Remains in TSU, NAEON.  BG elevated yesterday but well controlled overnight on transition IV insulin infusion.  Eatin-75%  Nausea: No  Hypoglycemia and intervention: No  Fever: No  TPN and/or TF: Yes  TPN and rate: 41 cc/hr    /63   Pulse 103   Temp 97.9 °F (36.6 °C) (Oral)   Resp 14   Ht 5' 3" (1.6 m)   Wt 58.8 kg (129 lb 10.1 oz)   LMP  (LMP Unknown)   SpO2 97%   Breastfeeding? No   BMI 22.96 kg/m²     Labs Reviewed and Include    No results for input(s): GLU, CALCIUM, ALBUMIN, PROT, NA, K, CO2, CL, BUN, CREATININE, ALKPHOS, ALT, AST, BILITOT in the last 24 hours.  Lab Results   Component Value Date    WBC 2.78 (L) 2018    HGB 7.7 (L) 2018    HCT 24.3 (L) 2018    MCV 94 2018     (L) 2018     No results for input(s): TSH, FREET4 in the last 168 hours.  Lab Results   Component Value Date    HGBA1C 6.0 (H) 10/04/2018       Nutritional status:   Body mass index is 22.96 kg/m².  Lab Results   Component Value Date    ALBUMIN 2.5 (L) 2018    ALBUMIN 2.4 (L) 12/15/2018    ALBUMIN 2.2 (L) 2018     Lab Results   Component Value Date    PREALBUMIN 13 (L) 12/10/2018    PREALBUMIN 15 (L) 2018    PREALBUMIN 12 (L) 2018       Estimated Creatinine Clearance: 54.9 mL/min (based on SCr of 0.8 mg/dL).    Accu-Checks  Recent Labs     12/15/18  0817 12/15/18  1158 12/15/18  1642 12/15/18  2202 18  0218 18  0734 18  1219 18  1721 18  2101 18  0207   POCTGLUCOSE 178* 246* 125* 106 206* 211* 310* 325* 125* 177*       Current Medications and/or Treatments Impacting Glycemic Control  Immunotherapy:    Immunosuppressants         Stop Route Frequency     tacrolimus capsule 2 mg      -- Oral Daily     tacrolimus capsule 1 mg      -- Oral Daily        Steroids:   Hormones (From admission, onward)    Start     Stop Route Frequency Ordered    18  octreotide injection 50 mcg      -- IV " Every 8 hours 12/11/18 1531    10/17/18 0945  predniSONE tablet 15 mg      10/24 0859 Oral Daily 10/17/18 0943        Pressors:    Autonomic Drugs (From admission, onward)    Start     Stop Route Frequency Ordered    10/06/18 1617  rocuronium (ZEMURON) 10 mg/mL injection     Comments:  Created by cabinet override    10/07 0429   10/06/18 1617        Hyperglycemia/Diabetes Medications:   Antihyperglycemics (From admission, onward)    Start     Stop Route Frequency Ordered    12/14/18 1700  insulin regular (Humulin R) 100 Units in sodium chloride 0.9% 100 mL infusion      -- IV Continuous 12/14/18 1555    12/14/18 1700  insulin aspart U-100 pen 2-4 Units      -- SubQ 3 times daily with meals 12/14/18 1555    12/14/18 1655  insulin aspart U-100 pen 0-4 Units      -- SubQ As needed (PRN) 12/14/18 1555    10/07/18 1200  insulin regular (Humulin R) 100 Units in sodium chloride 0.9% 100 mL infusion      10/13 2100 IV Continuous 10/07/18 1055

## 2018-12-17 NOTE — ASSESSMENT & PLAN NOTE
- Dietary consulted.   - Temporal and distal extremity muscle wasting and edema.  - Encourage supplements and to increase nutritional intake.  - Per nephrology, pt to be on low protein diet.  - Prealbumin reviewed.   - Tolerating diet.  Denies n/v.    - Attempted low fat, high protein diet due to suspicion for chyle leak with no improvement seen in fluid character.  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 Had advanced diet with return of milky drainage,   - cont on TPN - pt advanced clear liquid diet 12/12; full liquid diet 12/14, full diet 12/17. Tolerating well.

## 2018-12-17 NOTE — PLAN OF CARE
Pt AAOx4, VSS, afebrile. Insulin gtt infusing at 1.4u/hr, TPN continues to be infused at 41ml/hr.  R rikki continues to drain milky yellow fluid, small fluid leakage at site, changed dressing.  R  picc CDI; chevron dressing CDI.  Fall risk precautions initiated.  Pt in lowest bed position setting, lighting adjusted, pt to wear nonskid socks when ambulating, side rails up x2.  Pt remain free from falls during shift. Spouse remains at bedside. Call light within reach. Will continue to monitor.

## 2018-12-17 NOTE — ASSESSMENT & PLAN NOTE
- Paracentesis 10/1, 5L removed, no fluid sent for analysis.  - Pt remains volume overloaded.   - Liver US 11/6 with severe ascites.   - Paracentesis 11/7 with 8.9 L removed. Wbc 39, segs 16%. Gram stain - no organisms seen and no WBC. Cultures NGTD.  - Paracentesis 11/12: 8.6 L removed. 34 wbc, 25% segs, 33 lymphs, 42 monocytes/macrophages. Cultures NGTD  - Paracentesis 11/21 with 4 L removed, neg for infection.  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 Milky appearance, cell count of fluid (51% lymphs), triglycerides 2208, consistent with chyle leak.   - Peritoneal fluid 11/29 growing enterococcus - treatment with Vanc x 10 days (completed 12/9) per ID recs.   - Attempted low fat, high protein diet due to suspicion for chyle leak with no improvement seen in fluid character.  - CT abd/pelvis 12/3 shows small volume abdominal ascites, improved compared to prior, pelvis ascites unchanged from prior, unchanged b/l pleural effusions (large on R, moderate on L) with bibasilar compressive atelectasis and consolidative changes.  - Placed PICC line and started TPN + octreotide 12/3. Improvement in color of fluid seen 12/6 - 12/7 (less milky, more abbie).   - continue TPN.  - Lasix 20 mg iv x 1 12/11, 12/12, 12/13, 12/14, 12/16  - Lasix 20 mg IV BID today.    - drain placed to suction w/ max 2L. Perc drain removed today  - Alb 25% x 1  Today   - Alb 25% x 1

## 2018-12-17 NOTE — PROGRESS NOTES
[facility]  Liver Transplant  Progress Note    Patient Name: Scarlett Reddy  MRN: [id number]  Admission Date: 10/1/2018  Hospital Length of Stay: 76 days  Code Status: Full Code  Primary Care Provider: Primary Doctor No  Post-Operative Day: 73    ORGAN:   LIVER  Disease Etiology: Cirrhosis: Type B and D  Donor Type:    - Brain Death  CDC High Risk:   No  Donor CMV Status:   Donor CMV Status: Positive  Donor HBcAB:   Negative  Donor HCV Status:   Negative  Donor HBV SETH: Negative  Donor HCV SETH: Negative  Whole or Partial: Whole Liver  Biliary Anastomosis: End to End  Arterial Anatomy: Replaced Left Hepatic and Right Hepatic  Subjective:     History of Present Illness:  Ms. Reddy is a 67 y/o female with PMH of ESLD secondary Hep B and D.  Listed for liver transplant with MELD 23.  Paracentesis 10/1 with 5L removed, no fluid sent for analysis.  Morning labs significant for hyponatremia, Na 119.  Pt admitted to LTS for sodium monitoring.        Hospital Course:  Hospital Course: 67 y/o F h/o HBV/delta cirrhosis s/p DBDLT 10/5/18 (CMV D+/R+, steroid induction, MMF/tacro/pred maintenance) with take back on 10/6 2/2 hyperbilirubinema and bilious VALORIE drainage, no leak identified. Post-op course c/b hypercapneic and hypoxic respiratory failure and was reintubated though quickly extubated now doing well. Liver bx (10/6) sig for Zone 3 hemorrhage and collapse, in addition to cholestasis. Transferred from ICU on POD #4. ID consulted to comment on positive diphtheroid culture from ascitic fluid (likely skin colonization) as well as ESBL Klebsiella pneumoniae in urine culture (10/4).  Pt asymptomatic and cell count from ascitic fluid unremarkable. Per ID recs, no need to treat diphtheroids or asymptomatic ESBL Kleb pneumo bacteriuria though pt has had 6 days of therapy which would cover these organisms. Mild peritransplant ascites- repeat US 10/6 with increased arterial resistive indices. Repeat US 10/9  w/ continued elevation in RIs and fluid collections. LFTs trending down nicely.     Acute gradual worsening of renal function s/p OLTx, Nephrology consulted for post-op EVA. Creatinine on arrival 0.8 and trended up but pt remained with good UOP. Had stable response to Diuresis. Pt also with persistently elevated BUN, required multiple rounds of dialysis (10/15, 10/17, 10/20, 10/31), although not much improvement noted. Kidney function has since improved, with Cr now returned to baseline.     In addition, pt H/H cont to fluctuate requiring multiple blood transfusions (10/14, 10/16, 10/18, 10/19, 10/22, 10/27, 10/29).  Pt reported dark stools 10/15 but color has normalized. FOBT+. EGD 10/18 consistent with ulcerated mucosa in duodenum s/p coagulation. She was started on Protonix 40mg and completed course of amoxicillin/levofloxacin on 11/2 for concern of H. Pylori. Biopsy negative for infection and CMV. Transfuse as needed with goal Hb > 7.0.  Urine cx + Klebsiella pneumoniae (10/13). Received 3 day course of ciprofloxacin, dc'd 10/19. Patient finished 7 day course of Ertapenem for kleb pneumo ESBL UTI 11/29. Of note, pt grew same bacteria from wound 11/16. Purulent drainage noted from middle of chevron incision (11/16). Wound cx grew klebsiella p. CT scan showed no signs of infection. Area opened with wound vac placed 11/24, removed 12/7.  On 10/19 pm pt c/o crushing chest pain. Troponin 0.094, likely 2/2 ischemic stress. EKG NSR.     Liver US 11/6 showed no arterial flow. Repeat Liver US 11/8 showed arterial flow with increased RIs. 11/15 liver u/s showed abnormally decreased main hepatic artery peak systolic velocity with slow arterial flow and abnormal tardus parvus waveforms intrahepatically. Consulted vascular surgery. Repeat liver US 11/19 showed no arterial flow on R and sluggish flow on L. Hepatic angiogram 11/19 confirmed HAT. Liver US to assess recanalization post HAT 12/4 -> conclusion that pt may  recanalize. Will monitor closely with no further intervention planned at this time.     CT 11/26 showed large right and moderate left pleural effusions with adjacent basilar atelectasis. Pt sp02 stable on RA, but c/o some orthopnea. Chest xray and echo 12/9 with pleural effusions. Right thoracentesis with chest tube placement 12/11, fluid negative for infection. Chest tube removed, 12/13 w/o any issue. CXR obtained- no pneumo noted. Bilateral pleural effusion still present.  Pt with continued worsening abdominal distention & LE edema. Multiple paracentesis (11/7, 11/12, 11/21- negative for infection) for ongoing ascites. 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 Milky appearance, cell count of fluid (51% lymphs), triglycerides 2208, consistent with chyle leak. Attempted low fat, high protein diet with no improvement seen in fluid character. 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 Peritoneal fluid 11/29 w/ Enterococcus. Per ID recs, treated with 10 day course of Vancomycin.    Interval history: No acute events overnight. LFTs remain stable. Perc drain in place with yellow milky drainage-- connected to suction for max 2L. Albumin 25% given. Drain removed today. Cont to diurese w/ lasix, BID today. Continue TPN. Pt transitioned to full diet. CMV PCR 12/13 <35. Monitor.     Scheduled Meds:   lidocaine HCL 10 mg/ml (1%)        aspirin  81 mg Oral Daily    atovaquone  1,500 mg Oral Daily    entecavir  0.5 mg Oral Daily    furosemide  20 mg Intravenous Once    heparin (porcine)  5,000 Units Subcutaneous Q12H    insulin aspart U-100  3-5 Units Subcutaneous TIDWM    magnesium oxide  800 mg Oral BID    metoprolol tartrate  25 mg Oral BID    octreotide  50 mcg Intravenous Q8H    pantoprazole  40 mg Oral BID    sodium chloride 0.9%  10 mL Intravenous Q6H    tacrolimus  2 mg Oral BID    valGANciclovir  450 mg Oral BID     Continuous Infusions:   insulin (HUMAN R) infusion (adults) 1.4  Units/hr (12/16/18 2105)    TPN ADULT CENTRAL LINE CUSTOM 41 mL/hr at 12/16/18 2104    TPN ADULT CENTRAL LINE CUSTOM       PRN Meds:sodium chloride, acetaminophen, bisacodyl, calcium carbonate, dextrose 50%, dextrose 50%, glucagon (human recombinant), glucose, glucose, hydrOXYzine HCl, insulin aspart U-100, ondansetron, ondansetron, polyethylene glycol, Flushing PICC Protocol **AND** sodium chloride 0.9% **AND** sodium chloride 0.9%, sodium chloride 0.9%, traMADol    Review of Systems   Constitutional: Positive for activity change and appetite change. Negative for chills and fever.   HENT: Negative for congestion and facial swelling.    Eyes: Negative for pain and discharge.   Respiratory: Negative for cough, chest tightness, shortness of breath and wheezing.    Cardiovascular: Positive for leg swelling. Negative for chest pain and palpitations.   Gastrointestinal: Positive for abdominal distention and abdominal pain. Negative for diarrhea, nausea and vomiting.   Endocrine: Negative.    Genitourinary: Negative for decreased urine volume and difficulty urinating.   Musculoskeletal: Negative for back pain.   Skin: Positive for wound.   Allergic/Immunologic: Positive for immunocompromised state.   Neurological: Positive for weakness.   Hematological: Negative.    Psychiatric/Behavioral: Positive for dysphoric mood and sleep disturbance.     Objective:     Vital Signs (Most Recent):  Temp: 98 °F (36.7 °C) (12/17/18 1116)  Pulse: 109 (12/17/18 1116)  Resp: (!) 23 (12/17/18 1116)  BP: 99/60 (12/17/18 1116)  SpO2: 99 % (12/17/18 1116) Vital Signs (24h Range):  Temp:  [97.9 °F (36.6 °C)-98.3 °F (36.8 °C)] 98 °F (36.7 °C)  Pulse:  [101-115] 109  Resp:  [14-24] 23  SpO2:  [96 %-99 %] 99 %  BP: ()/(52-69) 99/60     Weight: 58.8 kg (129 lb 10.1 oz)  Body mass index is 22.96 kg/m².    Intake/Output - Last 3 Shifts       12/15 0700 - 12/16 0659 12/16 0700 - 12/17 0659 12/17 0700 - 12/18 0659    P.O. 1382 720     I.V.  (mL/kg) 39.3 (0.7) 36.1 (0.6)     Other       .4 360.8     Total Intake(mL/kg) 1748.7 (30.1) 1116.9 (19)     Urine (mL/kg/hr) 0 (0) 0 (0)     Emesis/NG output 0      Drains 250 250 160    Other 0      Stool 0 0     Blood 0      Total Output 250 250 160    Net +1498.7 +866.9 -160           Urine Occurrence 9 x 4 x     Stool Occurrence 5 x 1 x     Emesis Occurrence 0 x            Physical Exam   Constitutional: She is oriented to person, place, and time. She appears well-developed.   Hand and temporal muscle wasting   HENT:   Head: Normocephalic.   Eyes: EOM are normal. Pupils are equal, round, and reactive to light. Scleral icterus is present.   Neck: Normal range of motion. Neck supple. No JVD present.   Cardiovascular: Regular rhythm, normal heart sounds and intact distal pulses. Tachycardia present.   Pulmonary/Chest: Effort normal. She has decreased breath sounds in the right lower field and the left lower field.   Abdominal: Soft. Bowel sounds are normal. She exhibits distension and ascites.       Perc drain in place w/ yellow drainage     Musculoskeletal: Normal range of motion. She exhibits edema.   Neurological: She is alert and oriented to person, place, and time.   Skin: Skin is warm and dry.   Psychiatric: She has a normal mood and affect. Her behavior is normal.   Nursing note and vitals reviewed.      Laboratory:  Immunosuppressants         Stop Route Frequency     tacrolimus capsule 2 mg      -- Oral 2 times daily        CBC:   Recent Labs   Lab 12/17/18  0600   WBC 2.78*   RBC 2.60*   HGB 7.7*   HCT 24.3*   *   MCV 94   MCH 29.6   MCHC 31.7*     CMP:   Recent Labs   Lab 12/17/18  0600   *   CALCIUM 8.0*   ALBUMIN 2.3*   PROT 4.6*      K 4.6   CO2 25      BUN 33*   CREATININE 0.9   ALKPHOS 174*   ALT 21   AST 20   BILITOT 1.0     Labs within the past 24 hours have been reviewed.    Diagnostic Results:  I have personally reviewed all pertinent imaging  "studies.    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   - POD 1 US: increased arterial resistive indices, suggestive of edema vs acute rejection vs congestion.  - Repeat US 10/9 with continued elevation of RIs.  - LFTs stable.  - T bili trending down.   - Repeat liver US (10/11): elevated RIs.  - Liver US 11/6 to assess for ascites. No arterial flow within the liver allograft concerning for interval thrombosis/occlusion of the arterial system. Severe ascites seen.  - Liver US 11/8: arterial flow seen w/ elevated RIs   - LFTs remain stable. No CTA at this time due to EVA and normal liver function.   - Liver US 11/15 showed "abnormally decreased main hepatic artery peak systolic velocity with slow arterial flow and abnormal tardus parvus waveforms intrahepatically."   -Vascular surgery consulted  - Liver US 11/19 showed no arterial flow on the right and sluggish flow on the left.   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 No further intervention planned at this time as patient's LFTs are stable. Will monitor.  - CT abd/pelvis 11/26 showed: 1. Persistent large volume of ascites. 2. Large right and moderate left pleural effusions with adjacent basilar atelectasis. 3. Anasarca  - IR placed perc drain 11/29 due to suspicion for chyle leak. 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 Octreotide dc'd.  - Fluid also growing enterococcus - treatment with Vanc x 10 days per ID recs, completed.      Sleeps in sitting position due to orthopnea    - obtain chest xray and echo.  - thoracentesis with chest tube placement 12/11/18, removed 12/13. No pneumo noted on CXR.       Hypoalbuminemia    - Continue to monitor.     Sequelae of hyperalimentation    - Monitor.       Pleural effusion    " - CT 11/26 showed: Large right and moderate left pleural effusions with adjacent basilar atelectasis.  - Pt sp02 stable on RA, but c/o some orthopnea.  - CT abd/pelvis 12/3 showed unchanged b/l pleural effusions (large on R, moderate on L) with bibasilar compressive atelectasis and consolidative changes.  - Sp02 stable & no signs of respiratory infection.  - repeat echo and chest xray with pleural effusion.  - thoracentesis with chest tube placement 12/11/18 - fluid negative for infection.  - CXR 12/12 w/ improvements noted.  - chest tube removed 12/13- CXR w/ no pneumo. Bilateral pleural effusion still present.  - Monitor     Hypomagnesemia    - Continue PO Mag Oxide.  - Continue to monitor.       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     Hypophosphatemia    - monitor.       Delayed surgical wound healing    - Middle chevron incision opened 11/16 with purulent drainage.  - Packed with Aquacel ag at this time. Growing Klebsiella p.   - CT abd/pelvis 11/17- no signs of infection.  - 11/17: R side of chevron incision w/ 3 small holes draining purulent fluid, opened and packed w/ aquacel.   - Wound care following.  - Wound w/ increased drainage, so wound care placed vac 11/24/18.  Apprec recs. Extending Ertapenem thru 11/28 per ID.    - Abdomen erythematous and indurated RLQ inferior to chevron.  - CT abd/pelvis 11/26 - no sign of infection.  - Last wound vac change 12/7 with improvements noted in wound - depth now 0.5cm.   - No longer needs vac per wound care.     Hepatic artery thrombosis of transplanted liver    - Liver US 11/19 showed no arterial flow to R and sluggish flow to L.  - Hepatic angiogram 11/19 confirmed HAT.  - Liver US 12/4 to evaluate for recanalization reviewed. Recanalization possible. Will continue to monitor.  - No further intervention planned at this time. Will continue to monitor.     Drug-induced pancytopenia    - Decreased plt, wbc, h/h.  - See  ""leukopenia"  - Continue to monitor.     Thrombocytopenia, unspecified    - Monitor.       Leukopenia    - Stopped Cellcept, Bactrim, and Valcyte 10/31.  - Cellcept half dose resumed 11/6  - Started Atovaquone 11/6.   - CMV PCR noted to be positive at 129. Held off on treatment with valcyte, however weekly CMV PCRs increasing, start treatment dose 12/9/18.  - Cellcept held 11/23 2/2 infection (UTI + wound).         At risk for opportunistic infections    - Valcyte for CMV prophylaxis--> holding (10/31) for leukopenia. Pt will need weekly CMV PCR while discontinued.  - CMV PCR noted to be positive at 129.  - CMV PCR 11/8: 105  - CMV PCR 11/15 with 89 copies  - CMV PCR 11/23 95 - increased from previous. Continue to hold Valcyte & monitor pt closely.  - CMV PCR 11/29 146.  - CMV PCR 12/6 529 - start treatment dose valcyte 12/9/18  - CMV PCR 12/13 < 35.  - Bactrim for PCP prophylaxis ([facility]).--> holding (10/31) for leukopenia.   - Started Atovaquone 11/6.  - Fluconazole for fungal prophylaxis (first dose 10/6). D/c Fluc 11/16 bc pt had been on it for > 30 days post take back.       Long-term use of immunosuppressant medication    - Prograf: stopped 10/29. Resumed 11/2.  --> Will monitor for signs of toxic side effects, check daily troughs, and change meds accordingly.   - Prednisone: stopped 10/29. Started on Hydrocortisone. pred taper resumed.  - Cellcept: stopped 10/31 for leukopenia. Restarted Cellcept 500 mg bid, 11/6.  - Cellcept held 11/23 2/2 infection.         Prophylactic immunotherapy    - See long term use immunosuppresion.       Chylous ascites    - Paracentesis 10/1, 5L removed, no fluid sent for analysis.  - Pt remains volume overloaded.   - Liver US 11/6 with severe ascites.   - Paracentesis 11/7 with 8.9 L removed. Wbc 39, segs 16%. Gram stain - no organisms seen and no WBC. Cultures NGTD.  - Paracentesis 11/12: 8.6 L removed. 34 wbc, 25% segs, 33 lymphs, 42 monocytes/macrophages. Cultures NGTD  - " Paracentesis 11/21 with 4 L removed, neg for infection.  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 Milky appearance, cell count of fluid (51% lymphs), triglycerides 2208, consistent with chyle leak.   - Peritoneal fluid 11/29 growing enterococcus - treatment with Vanc x 10 days (completed 12/9) per ID recs.   - Attempted low fat, high protein diet due to suspicion for chyle leak with no improvement seen in fluid character.  - CT abd/pelvis 12/3 shows small volume abdominal ascites, improved compared to prior, pelvis ascites unchanged from prior, unchanged b/l pleural effusions (large on R, moderate on L) with bibasilar compressive atelectasis and consolidative changes.  - Placed PICC line and started TPN + octreotide 12/3. Improvement in color of fluid seen 12/6 - 12/7 (less milky, more abbie).   - continue TPN.  - Lasix 20 mg iv x 1 12/11, 12/12, 12/13, 12/14, 12/16  - Lasix 20 mg IV BID today.    - drain placed to suction w/ max 2L. Perc drain removed today  - Alb 25% x 1  Today   - Alb 25% x 1      Physical deconditioning    - PT/OT consulted.  Progressing well w/ therapy.  - Recommend home health PT and rolling walker at discharge (orders placed 11/6).       Anemia requiring transfusions    - Fluctuating H&H.    - Transfusions: 10/14; 10/16; 10/18; 10/19; 10/22; 10/27; 10/29, 10/31, 11/3, 11/21, 11/29, 12/4, 12/13.  - FOBT +  - LDH elevated, Hapto < 10.  - EGD 10/18: ulcerated duodenal mucosa.   - Consulted GI about this ongoing issue and further need of another EGD.  - EGD 11/5 unremarkable.   - Iron studies 11/13:  95, TIBC 107, sat iron 89, transferrin 72.  - Hemolytic anemia workup 11/13: Haptoglobin 132, retic 3.9 .  - 12/13 - transfused 1u PRBC, minimal improvement in H/H.  - No overt signs of bleeding.  - Continue to monitor.     Severe malnutrition    - Dietary consulted.   - Temporal and distal extremity muscle wasting and edema.  - Encourage supplements and to increase  nutritional intake.  - Per nephrology, pt to be on low protein diet.  - Prealbumin reviewed.   - Tolerating diet.  Denies n/v.    - Attempted low fat, high protein diet due to suspicion for chyle leak with no improvement seen in fluid character.  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 Had advanced diet with return of milky drainage,   - cont on TPN - pt advanced clear liquid diet 12/12; full liquid diet 12/14, full diet 12/17. Tolerating well.      Type 2 diabetes mellitus with diabetic polyneuropathy, with long-term current use of insulin    - Continue home regimen.  - TPN started 12/3 and pt now on insulin drip.  - Endocrine following. Appreciate recs.       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  - Tx w/ Entecavir- dose changed to daily 11/26 due to improved renal fx.  - 12/3 Hep B surface antigen negative 12/3, surface AB quant 571, and Hep B DNA <10.  - Continue to monitor, per protocol.         VTE Risk Mitigation (From admission, onward)        Ordered     heparin (porcine) injection 5,000 Units  Every 12 hours      12/06/18 1058     IP VTE HIGH RISK PATIENT  Once      11/05/18 1145     Place sequential compression device  Until discontinued      10/05/18 0709          The patients clinical status was discussed at multidisplinary rounds, involving transplant surgery, transplant medicine, pharmacy, nursing, nutrition, and social work    Discharge Planning:  No Patient Care Coordination Note on file.      Jihan Soares PA-C  Liver Transplant  [facility]

## 2018-12-17 NOTE — ASSESSMENT & PLAN NOTE
- CT 11/26 showed: Large right and moderate left pleural effusions with adjacent basilar atelectasis.  - Pt sp02 stable on RA, but c/o some orthopnea.  - CT abd/pelvis 12/3 showed unchanged b/l pleural effusions (large on R, moderate on L) with bibasilar compressive atelectasis and consolidative changes.  - Sp02 stable & no signs of respiratory infection.  - repeat echo and chest xray with pleural effusion.  - thoracentesis with chest tube placement 12/11/18 - fluid negative for infection.  - CXR 12/12 w/ improvements noted.  - chest tube removed 12/13- CXR w/ no pneumo. Bilateral pleural effusion still present.  - Monitor

## 2018-12-17 NOTE — SUBJECTIVE & OBJECTIVE
Scheduled Meds:   lidocaine HCL 10 mg/ml (1%)        aspirin  81 mg Oral Daily    atovaquone  1,500 mg Oral Daily    entecavir  0.5 mg Oral Daily    furosemide  20 mg Intravenous Once    heparin (porcine)  5,000 Units Subcutaneous Q12H    insulin aspart U-100  3-5 Units Subcutaneous TIDWM    magnesium oxide  800 mg Oral BID    metoprolol tartrate  25 mg Oral BID    octreotide  50 mcg Intravenous Q8H    pantoprazole  40 mg Oral BID    sodium chloride 0.9%  10 mL Intravenous Q6H    tacrolimus  2 mg Oral BID    valGANciclovir  450 mg Oral BID     Continuous Infusions:   insulin (HUMAN R) infusion (adults) 1.4 Units/hr (12/16/18 2105)    TPN ADULT CENTRAL LINE CUSTOM 41 mL/hr at 12/16/18 2104    TPN ADULT CENTRAL LINE CUSTOM       PRN Meds:sodium chloride, acetaminophen, bisacodyl, calcium carbonate, dextrose 50%, dextrose 50%, glucagon (human recombinant), glucose, glucose, hydrOXYzine HCl, insulin aspart U-100, ondansetron, ondansetron, polyethylene glycol, Flushing PICC Protocol **AND** sodium chloride 0.9% **AND** sodium chloride 0.9%, sodium chloride 0.9%, traMADol    Review of Systems   Constitutional: Positive for activity change and appetite change. Negative for chills and fever.   HENT: Negative for congestion and facial swelling.    Eyes: Negative for pain and discharge.   Respiratory: Negative for cough, chest tightness, shortness of breath and wheezing.    Cardiovascular: Positive for leg swelling. Negative for chest pain and palpitations.   Gastrointestinal: Positive for abdominal distention and abdominal pain. Negative for diarrhea, nausea and vomiting.   Endocrine: Negative.    Genitourinary: Negative for decreased urine volume and difficulty urinating.   Musculoskeletal: Negative for back pain.   Skin: Positive for wound.   Allergic/Immunologic: Positive for immunocompromised state.   Neurological: Positive for weakness.   Hematological: Negative.    Psychiatric/Behavioral: Positive for  dysphoric mood and sleep disturbance.     Objective:     Vital Signs (Most Recent):  Temp: 98 °F (36.7 °C) (12/17/18 1116)  Pulse: 109 (12/17/18 1116)  Resp: (!) 23 (12/17/18 1116)  BP: 99/60 (12/17/18 1116)  SpO2: 99 % (12/17/18 1116) Vital Signs (24h Range):  Temp:  [97.9 °F (36.6 °C)-98.3 °F (36.8 °C)] 98 °F (36.7 °C)  Pulse:  [101-115] 109  Resp:  [14-24] 23  SpO2:  [96 %-99 %] 99 %  BP: ()/(52-69) 99/60     Weight: 58.8 kg (129 lb 10.1 oz)  Body mass index is 22.96 kg/m².    Intake/Output - Last 3 Shifts       12/15 0700 - 12/16 0659 12/16 0700 - 12/17 0659 12/17 0700 - 12/18 0659    P.O. 1380 720     I.V. (mL/kg) 39.3 (0.7) 36.1 (0.6)     Other       .4 360.8     Total Intake(mL/kg) 1748.7 (30.1) 1116.9 (19)     Urine (mL/kg/hr) 0 (0) 0 (0)     Emesis/NG output 0      Drains 250 250 160    Other 0      Stool 0 0     Blood 0      Total Output 250 250 160    Net +1498.7 +866.9 -160           Urine Occurrence 9 x 4 x     Stool Occurrence 5 x 1 x     Emesis Occurrence 0 x            Physical Exam   Constitutional: She is oriented to person, place, and time. She appears well-developed.   Hand and temporal muscle wasting   HENT:   Head: Normocephalic.   Eyes: EOM are normal. Pupils are equal, round, and reactive to light. Scleral icterus is present.   Neck: Normal range of motion. Neck supple. No JVD present.   Cardiovascular: Regular rhythm, normal heart sounds and intact distal pulses. Tachycardia present.   Pulmonary/Chest: Effort normal. She has decreased breath sounds in the right lower field and the left lower field.   Abdominal: Soft. Bowel sounds are normal. She exhibits distension and ascites.       Perc drain in place w/ yellow drainage     Musculoskeletal: Normal range of motion. She exhibits edema.   Neurological: She is alert and oriented to person, place, and time.   Skin: Skin is warm and dry.   Psychiatric: She has a normal mood and affect. Her behavior is normal.   Nursing note and  vitals reviewed.      Laboratory:  Immunosuppressants         Stop Route Frequency     tacrolimus capsule 2 mg      -- Oral 2 times daily        CBC:   Recent Labs   Lab 12/17/18  0600   WBC 2.78*   RBC 2.60*   HGB 7.7*   HCT 24.3*   *   MCV 94   MCH 29.6   MCHC 31.7*     CMP:   Recent Labs   Lab 12/17/18  0600   *   CALCIUM 8.0*   ALBUMIN 2.3*   PROT 4.6*      K 4.6   CO2 25      BUN 33*   CREATININE 0.9   ALKPHOS 174*   ALT 21   AST 20   BILITOT 1.0     Labs within the past 24 hours have been reviewed.    Diagnostic Results:  I have personally reviewed all pertinent imaging studies.

## 2018-12-17 NOTE — ASSESSMENT & PLAN NOTE
Managed per primary team  Avoid hypoglycemia    Recent Labs   Lab 12/17/18  0600   ALT 21   AST 20   ALKPHOS 174*   BILITOT 1.0   PROT 4.6*   ALBUMIN 2.3*

## 2018-12-18 LAB
ALBUMIN SERPL BCP-MCNC: 2.7 G/DL
ALP SERPL-CCNC: 182 U/L
ALT SERPL W/O P-5'-P-CCNC: 18 U/L
ANION GAP SERPL CALC-SCNC: 6 MMOL/L
AST SERPL-CCNC: 20 U/L
BACTERIA SPEC ANAEROBE CULT: NORMAL
BASOPHILS # BLD AUTO: 0.01 K/UL
BASOPHILS NFR BLD: 0.3 %
BILIRUB SERPL-MCNC: 1.1 MG/DL
BUN SERPL-MCNC: 38 MG/DL
CALCIUM SERPL-MCNC: 7.9 MG/DL
CHLORIDE SERPL-SCNC: 102 MMOL/L
CO2 SERPL-SCNC: 28 MMOL/L
CREAT SERPL-MCNC: 0.9 MG/DL
DIFFERENTIAL METHOD: ABNORMAL
EOSINOPHIL # BLD AUTO: 0.1 K/UL
EOSINOPHIL NFR BLD: 2.2 %
ERYTHROCYTE [DISTWIDTH] IN BLOOD BY AUTOMATED COUNT: 17.6 %
EST. GFR  (AFRICAN AMERICAN): >60 ML/MIN/1.73 M^2
EST. GFR  (NON AFRICAN AMERICAN): >60 ML/MIN/1.73 M^2
GLUCOSE SERPL-MCNC: 200 MG/DL
HCT VFR BLD AUTO: 23.8 %
HGB BLD-MCNC: 7.5 G/DL
IMM GRANULOCYTES # BLD AUTO: 0.06 K/UL
IMM GRANULOCYTES NFR BLD AUTO: 1.9 %
LYMPHOCYTES # BLD AUTO: 0.6 K/UL
LYMPHOCYTES NFR BLD: 17.8 %
MAGNESIUM SERPL-MCNC: 1.9 MG/DL
MCH RBC QN AUTO: 30.1 PG
MCHC RBC AUTO-ENTMCNC: 31.5 G/DL
MCV RBC AUTO: 96 FL
MONOCYTES # BLD AUTO: 0.1 K/UL
MONOCYTES NFR BLD: 3.1 %
NEUTROPHILS # BLD AUTO: 2.4 K/UL
NEUTROPHILS NFR BLD: 74.7 %
NRBC BLD-RTO: 0 /100 WBC
PHOSPHATE SERPL-MCNC: 2.2 MG/DL
PLATELET # BLD AUTO: 111 K/UL
PMV BLD AUTO: 9.9 FL
POCT GLUCOSE: 109 MG/DL (ref 70–110)
POCT GLUCOSE: 204 MG/DL (ref 70–110)
POCT GLUCOSE: 211 MG/DL (ref 70–110)
POCT GLUCOSE: 277 MG/DL (ref 70–110)
POCT GLUCOSE: 347 MG/DL (ref 70–110)
POTASSIUM SERPL-SCNC: 4.2 MMOL/L
PROT SERPL-MCNC: 4.9 G/DL
RBC # BLD AUTO: 2.49 M/UL
SODIUM SERPL-SCNC: 136 MMOL/L
TACROLIMUS BLD-MCNC: 4.4 NG/ML
WBC # BLD AUTO: 3.21 K/UL

## 2018-12-18 PROCEDURE — 99233 SBSQ HOSP IP/OBS HIGH 50: CPT | Mod: 24,,, | Performed by: PHYSICIAN ASSISTANT

## 2018-12-18 PROCEDURE — 85025 COMPLETE CBC W/AUTO DIFF WBC: CPT

## 2018-12-18 PROCEDURE — 25000003 PHARM REV CODE 250: Performed by: TRANSPLANT SURGERY

## 2018-12-18 PROCEDURE — 80053 COMPREHEN METABOLIC PANEL: CPT

## 2018-12-18 PROCEDURE — 83735 ASSAY OF MAGNESIUM: CPT

## 2018-12-18 PROCEDURE — 99900035 HC TECH TIME PER 15 MIN (STAT)

## 2018-12-18 PROCEDURE — 63600175 PHARM REV CODE 636 W HCPCS: Performed by: PHYSICIAN ASSISTANT

## 2018-12-18 PROCEDURE — 94664 DEMO&/EVAL PT USE INHALER: CPT

## 2018-12-18 PROCEDURE — 99232 SBSQ HOSP IP/OBS MODERATE 35: CPT | Mod: ,,, | Performed by: NURSE PRACTITIONER

## 2018-12-18 PROCEDURE — 20600001 HC STEP DOWN PRIVATE ROOM

## 2018-12-18 PROCEDURE — 84100 ASSAY OF PHOSPHORUS: CPT

## 2018-12-18 PROCEDURE — 25000003 PHARM REV CODE 250: Performed by: PHYSICIAN ASSISTANT

## 2018-12-18 PROCEDURE — 63600175 PHARM REV CODE 636 W HCPCS: Performed by: TRANSPLANT SURGERY

## 2018-12-18 PROCEDURE — 80197 ASSAY OF TACROLIMUS: CPT

## 2018-12-18 PROCEDURE — 94761 N-INVAS EAR/PLS OXIMETRY MLT: CPT

## 2018-12-18 PROCEDURE — 25000003 PHARM REV CODE 250: Performed by: NURSE PRACTITIONER

## 2018-12-18 PROCEDURE — A4216 STERILE WATER/SALINE, 10 ML: HCPCS | Performed by: TRANSPLANT SURGERY

## 2018-12-18 PROCEDURE — A4217 STERILE WATER/SALINE, 500 ML: HCPCS | Performed by: PHYSICIAN ASSISTANT

## 2018-12-18 RX ORDER — INSULIN ASPART 100 [IU]/ML
4-6 INJECTION, SOLUTION INTRAVENOUS; SUBCUTANEOUS
Status: DISCONTINUED | OUTPATIENT
Start: 2018-12-18 | End: 2018-12-20

## 2018-12-18 RX ORDER — FUROSEMIDE 40 MG/1
40 TABLET ORAL 2 TIMES DAILY
Status: DISCONTINUED | OUTPATIENT
Start: 2018-12-18 | End: 2018-12-20

## 2018-12-18 RX ADMIN — HEPARIN SODIUM 5000 UNITS: 5000 INJECTION, SOLUTION INTRAVENOUS; SUBCUTANEOUS at 07:12

## 2018-12-18 RX ADMIN — INSULIN ASPART 5 UNITS: 100 INJECTION, SOLUTION INTRAVENOUS; SUBCUTANEOUS at 09:12

## 2018-12-18 RX ADMIN — Medication 10 ML: at 06:12

## 2018-12-18 RX ADMIN — HEPARIN SODIUM 5000 UNITS: 5000 INJECTION, SOLUTION INTRAVENOUS; SUBCUTANEOUS at 09:12

## 2018-12-18 RX ADMIN — PANTOPRAZOLE SODIUM 40 MG: 40 TABLET, DELAYED RELEASE ORAL at 07:12

## 2018-12-18 RX ADMIN — INSULIN ASPART 6 UNITS: 100 INJECTION, SOLUTION INTRAVENOUS; SUBCUTANEOUS at 12:12

## 2018-12-18 RX ADMIN — INSULIN ASPART 1 UNITS: 100 INJECTION, SOLUTION INTRAVENOUS; SUBCUTANEOUS at 02:12

## 2018-12-18 RX ADMIN — OCTREOTIDE ACETATE 50 MCG: 100 INJECTION, SOLUTION INTRAVENOUS; SUBCUTANEOUS at 05:12

## 2018-12-18 RX ADMIN — MAGNESIUM OXIDE TAB 400 MG (241.3 MG ELEMENTAL MG) 800 MG: 400 (241.3 MG) TAB at 07:12

## 2018-12-18 RX ADMIN — TACROLIMUS 2 MG: 1 CAPSULE ORAL at 09:12

## 2018-12-18 RX ADMIN — INSULIN ASPART 2 UNITS: 100 INJECTION, SOLUTION INTRAVENOUS; SUBCUTANEOUS at 12:12

## 2018-12-18 RX ADMIN — TACROLIMUS 2 MG: 1 CAPSULE ORAL at 05:12

## 2018-12-18 RX ADMIN — CALCIUM GLUCONATE: 94 INJECTION, SOLUTION INTRAVENOUS at 08:12

## 2018-12-18 RX ADMIN — METOPROLOL TARTRATE 25 MG: 25 TABLET ORAL at 09:12

## 2018-12-18 RX ADMIN — FUROSEMIDE 40 MG: 40 TABLET ORAL at 05:12

## 2018-12-18 RX ADMIN — INSULIN ASPART 1 UNITS: 100 INJECTION, SOLUTION INTRAVENOUS; SUBCUTANEOUS at 09:12

## 2018-12-18 RX ADMIN — PANTOPRAZOLE SODIUM 40 MG: 40 TABLET, DELAYED RELEASE ORAL at 09:12

## 2018-12-18 RX ADMIN — VALGANCICLOVIR 450 MG: 450 TABLET, FILM COATED ORAL at 09:12

## 2018-12-18 RX ADMIN — FUROSEMIDE 40 MG: 40 TABLET ORAL at 12:12

## 2018-12-18 RX ADMIN — ASPIRIN 81 MG CHEWABLE TABLET 81 MG: 81 TABLET CHEWABLE at 09:12

## 2018-12-18 RX ADMIN — ATOVAQUONE 1500 MG: 750 SUSPENSION ORAL at 09:12

## 2018-12-18 RX ADMIN — VALGANCICLOVIR 450 MG: 450 TABLET, FILM COATED ORAL at 07:12

## 2018-12-18 RX ADMIN — MAGNESIUM OXIDE TAB 400 MG (241.3 MG ELEMENTAL MG) 800 MG: 400 (241.3 MG) TAB at 09:12

## 2018-12-18 RX ADMIN — ENTECAVIR 0.5 MG: 0.5 TABLET ORAL at 09:12

## 2018-12-18 RX ADMIN — INSULIN ASPART 4 UNITS: 100 INJECTION, SOLUTION INTRAVENOUS; SUBCUTANEOUS at 09:12

## 2018-12-18 RX ADMIN — Medication 10 ML: at 12:12

## 2018-12-18 NOTE — ASSESSMENT & PLAN NOTE
- Dietary consulted.   - Temporal and distal extremity muscle wasting and edema.  - Encourage supplements and to increase nutritional intake.  - Per nephrology, pt to be on low protein diet.  - Prealbumin reviewed.   - Tolerating diet.  Denies n/v.    - Attempted low fat, high protein diet due to suspicion for chyle leak with no improvement seen in fluid character.  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 Had advanced diet with return of milky drainage,   - cont on TPN - pt now tolerating full diet. PO intake improving. Monitor

## 2018-12-18 NOTE — ASSESSMENT & PLAN NOTE
BG goal 140 - 180     Transition IV insulin infusion at 1.4 u/hr  Increase Novolog to 4-6 units with meals  Low dose correction  BG monitoring /HS/0200    Discharge planning: [facility]

## 2018-12-18 NOTE — ASSESSMENT & PLAN NOTE
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   - POD 1 US: increased arterial resistive indices, suggestive of edema vs acute rejection vs congestion.  - Repeat US 10/9 with continued elevation of RIs.  - LFTs stable.  - T bili trending down.   - Repeat liver US (10/11): elevated RIs.  - Liver US 11/6 to assess for ascites. No arterial flow within the liver allograft concerning for interval thrombosis/occlusion of the arterial system. Severe ascites seen.  - Liver US 11/8: arterial flow seen w/ elevated RIs   - LFTs remain stable. No CTA at this time due to EVA and normal liver function.   - Liver US 11/15 showed "abnormally decreased main hepatic artery peak systolic velocity with slow arterial flow and abnormal tardus parvus waveforms intrahepatically."   -Vascular surgery consulted  - Liver US 11/19 showed no arterial flow on the right and sluggish flow on the left.   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 No further intervention planned at this time as patient's LFTs are stable. Will monitor.  - CT abd/pelvis 11/26 showed: 1. Persistent large volume of ascites. 2. Large right and moderate left pleural effusions with adjacent basilar atelectasis. 3. Anasarca  - IR placed perc drain 11/29 due to suspicion for chyle leak. 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 Octreotide dc'd 12/18  - Fluid also growing enterococcus - treatment with Vanc x 10 days per ID recs, completed.   - perc drain removed 12/17  "

## 2018-12-18 NOTE — ASSESSMENT & PLAN NOTE
- Stopped Cellcept, Bactrim, and Valcyte 10/31.  - Cellcept half dose resumed 11/6  - Started Atovaquone 11/6.   - CMV PCR noted to be positive at 129 (11/1). Held off on treatment with valcyte, however weekly CMV PCRs increasing, start treatment dose 12/9/18.  - Cellcept held 11/23 2/2 infection (UTI + wound).

## 2018-12-18 NOTE — PLAN OF CARE
Problem: Patient Care Overview  Goal: Plan of Care Review  Outcome: Ongoing (interventions implemented as appropriate)  Pt aao x3. Vss. No acute distress. Pt had significant serosanguinous drainage leaking from right flank drain sight. Right drain sight connected to suction. 1600cc removed. Drain discontinued.

## 2018-12-18 NOTE — SUBJECTIVE & OBJECTIVE
"Interval HPI:   Overnight events: Remains in TSU. NAEON. BG slightly above goal on insulin infusion at 1.4 u/hr with scheduled insulin and correction scale.   Eatin% - 100%  Nausea: No  Hypoglycemia and intervention: No  Fever: No  TPN:  42 cc/hr      /83   Pulse 108   Temp 98.6 °F (37 °C) (Oral)   Resp 17   Ht 5' 3" (1.6 m)   Wt 56.3 kg (124 lb 1.9 oz)   LMP  (LMP Unknown)   SpO2 99%   Breastfeeding? No   BMI 21.99 kg/m²     Labs Reviewed and Include    Recent Labs   Lab 18  0600   *   CALCIUM 7.9*   ALBUMIN 2.7*   PROT 4.9*      K 4.2   CO2 28      BUN 38*   CREATININE 0.9   ALKPHOS 182*   ALT 18   AST 20   BILITOT 1.1*     Lab Results   Component Value Date    WBC 3.21 (L) 2018    HGB 7.5 (L) 2018    HCT 23.8 (L) 2018    MCV 96 2018     (L) 2018     No results for input(s): TSH, FREET4 in the last 168 hours.  Lab Results   Component Value Date    HGBA1C 6.0 (H) 10/04/2018       Nutritional status:   Body mass index is 21.99 kg/m².  Lab Results   Component Value Date    ALBUMIN 2.7 (L) 2018    ALBUMIN 2.3 (L) 2018    ALBUMIN 2.5 (L) 2018     Lab Results   Component Value Date    PREALBUMIN 13 (L) 12/10/2018    PREALBUMIN 15 (L) 2018    PREALBUMIN 12 (L) 2018       Estimated Creatinine Clearance: 48.8 mL/min (based on SCr of 0.9 mg/dL).    Accu-Checks  Recent Labs     18  1219 18  1721 18  2101 18  0207 18  0817 18  1214 18  1755 18  2232 18  0207 18  0904   POCTGLUCOSE 310* 325* 125* 177* 212* 199* 223* 238* 204* 211*       Current Medications and/or Treatments Impacting Glycemic Control  Immunotherapy:    Immunosuppressants         Stop Route Frequency     tacrolimus capsule 2 mg      -- Oral 2 times daily        Steroids:   Hormones (From admission, onward)    Start     Stop Route Frequency Ordered    18  octreotide injection 50 " mcg      -- IV Every 8 hours 12/11/18 1531    10/17/18 0945  predniSONE tablet 15 mg      10/24 0859 Oral Daily 10/17/18 0943        Pressors:    Autonomic Drugs (From admission, onward)    Start     Stop Route Frequency Ordered    10/06/18 1617  rocuronium (ZEMURON) 10 mg/mL injection     Comments:  Created by cabinet override    10/07 0429   10/06/18 1617        Hyperglycemia/Diabetes Medications:   Antihyperglycemics (From admission, onward)    Start     Stop Route Frequency Ordered    12/17/18 1130  insulin aspart U-100 pen 3-5 Units      -- SubQ 3 times daily with meals 12/17/18 0805    12/14/18 1700  insulin regular (Humulin R) 100 Units in sodium chloride 0.9% 100 mL infusion      -- IV Continuous 12/14/18 1555    12/14/18 1655  insulin aspart U-100 pen 0-4 Units      -- SubQ As needed (PRN) 12/14/18 1555    10/07/18 1200  insulin regular (Humulin R) 100 Units in sodium chloride 0.9% 100 mL infusion      10/13 2100 IV Continuous 10/07/18 1055

## 2018-12-18 NOTE — ASSESSMENT & PLAN NOTE
- Valcyte for CMV prophylaxis--> held 10/31 for leukopenia. Pt will need weekly CMV PCR while discontinued.  - CMV PCR noted to be positive at 129.  - CMV PCR 11/8: 105  - CMV PCR 11/15 with 89 copies  - CMV PCR 11/23 95 - increased from previous. Continue to hold Valcyte & monitor pt closely.  - CMV PCR 11/29 146.  - CMV PCR 12/6 529 - start treatment dose valcyte 12/9/18  - CMV PCR 12/13 < 35.  - Bactrim for PCP prophylaxis (MW).--> holding (10/31) for leukopenia.   - Started Atovaquone 11/6.  - Fluconazole for fungal prophylaxis (first dose 10/6). D/c Fluc 11/16 bc pt had been on it for > 30 days post take back.

## 2018-12-18 NOTE — SUBJECTIVE & OBJECTIVE
Scheduled Meds:   aspirin  81 mg Oral Daily    atovaquone  1,500 mg Oral Daily    entecavir  0.5 mg Oral Daily    heparin (porcine)  5,000 Units Subcutaneous Q12H    insulin aspart U-100  3-5 Units Subcutaneous TIDWM    magnesium oxide  800 mg Oral BID    metoprolol tartrate  25 mg Oral BID    octreotide  50 mcg Intravenous Q8H    pantoprazole  40 mg Oral BID    sodium chloride 0.9%  10 mL Intravenous Q6H    tacrolimus  2 mg Oral BID    valGANciclovir  450 mg Oral BID     Continuous Infusions:   insulin (HUMAN R) infusion (adults) 1.4 Units/hr (12/17/18 1803)    TPN ADULT CENTRAL LINE CUSTOM 42 mL/hr at 12/17/18 2037    TPN ADULT CENTRAL LINE CUSTOM       PRN Meds:sodium chloride, acetaminophen, bisacodyl, calcium carbonate, dextrose 50%, dextrose 50%, glucagon (human recombinant), glucose, glucose, hydrOXYzine HCl, insulin aspart U-100, ondansetron, ondansetron, polyethylene glycol, Flushing PICC Protocol **AND** sodium chloride 0.9% **AND** sodium chloride 0.9%, sodium chloride 0.9%, traMADol    Review of Systems   Constitutional: Positive for activity change and appetite change. Negative for chills and fever.   HENT: Negative for congestion and facial swelling.    Eyes: Negative for pain and discharge.   Respiratory: Negative for cough, chest tightness, shortness of breath and wheezing.    Cardiovascular: Positive for leg swelling. Negative for chest pain and palpitations.   Gastrointestinal: Positive for abdominal distention and abdominal pain. Negative for diarrhea, nausea and vomiting.   Endocrine: Negative.    Genitourinary: Negative for decreased urine volume and difficulty urinating.   Musculoskeletal: Negative for back pain.   Skin: Positive for wound.   Allergic/Immunologic: Positive for immunocompromised state.   Neurological: Positive for weakness.   Hematological: Negative.    Psychiatric/Behavioral: Positive for dysphoric mood and sleep disturbance.     Objective:     Vital Signs (Most  Recent):  Temp: 98.6 °F (37 °C) (12/18/18 0845)  Pulse: 108 (12/18/18 0925)  Resp: 17 (12/18/18 0925)  BP: 131/83 (12/18/18 0925)  SpO2: 99 % (12/18/18 0925) Vital Signs (24h Range):  Temp:  [98 °F (36.7 °C)-99 °F (37.2 °C)] 98.6 °F (37 °C)  Pulse:  [104-118] 108  Resp:  [14-23] 17  SpO2:  [97 %-99 %] 99 %  BP: ()/(60-84) 131/83     Weight: 56.3 kg (124 lb 1.9 oz)  Body mass index is 21.99 kg/m².    Intake/Output - Last 3 Shifts       12/16 0700 - 12/17 0659 12/17 0700 - 12/18 0659 12/18 0700 - 12/19 0659    P.O. 720 420     I.V. (mL/kg) 36.1 (0.6) 31 (0.6)     .8 914.2     Total Intake(mL/kg) 1116.9 (19) 1365.2 (24.2)     Urine (mL/kg/hr) 0 (0)      Emesis/NG output       Drains 250 1710     Other       Stool 0      Blood       Total Output 250 1710     Net +866.9 -344.8            Urine Occurrence 4 x 2 x     Stool Occurrence 1 x 0 x     Emesis Occurrence  0 x           Physical Exam   Constitutional: She is oriented to person, place, and time. She appears well-developed.   Hand and temporal muscle wasting   HENT:   Head: Normocephalic.   Eyes: EOM are normal. Pupils are equal, round, and reactive to light. Scleral icterus is present.   Neck: Normal range of motion. Neck supple. No JVD present.   Cardiovascular: Regular rhythm, normal heart sounds and intact distal pulses. Tachycardia present.   Pulmonary/Chest: Effort normal. She has decreased breath sounds in the right lower field and the left lower field.   Abdominal: Soft. Bowel sounds are normal. She exhibits distension and ascites.       Chevron incision healing  Perc drain removed, RLQ stitch in place     Musculoskeletal: Normal range of motion. She exhibits edema.   Neurological: She is alert and oriented to person, place, and time.   Skin: Skin is warm and dry.   Psychiatric: She has a normal mood and affect. Her behavior is normal.   Nursing note and vitals reviewed.      Laboratory:  Immunosuppressants         Stop Route Frequency      tacrolimus capsule 2 mg      -- Oral 2 times daily        CBC:   Recent Labs   Lab 12/18/18  0600   WBC 3.21*   RBC 2.49*   HGB 7.5*   HCT 23.8*   *   MCV 96   MCH 30.1   MCHC 31.5*     CMP:   Recent Labs   Lab 12/18/18  0600   *   CALCIUM 7.9*   ALBUMIN 2.7*   PROT 4.9*      K 4.2   CO2 28      BUN 38*   CREATININE 0.9   ALKPHOS 182*   ALT 18   AST 20   BILITOT 1.1*     Labs within the past 24 hours have been reviewed.    Diagnostic Results:  I have personally reviewed all pertinent imaging studies.

## 2018-12-18 NOTE — ASSESSMENT & PLAN NOTE
BG goal 140 - 180     Transition IV insulin infusion at 1.2 u/hr  Continue Novolog to 4-6 units with meals; give half the scheduled insulin if BG . Hold if less than 90.   Low dose correction  BG monitoring //0200    Discharge planning: [facility]

## 2018-12-18 NOTE — PROGRESS NOTES
[facility]  Liver Transplant  Progress Note    Patient Name: Scarlett Reddy  MRN: [id number]  Admission Date: 10/1/2018  Hospital Length of Stay: 77 days  Code Status: Full Code  Primary Care Provider: Primary Doctor No  Post-Operative Day: 74    ORGAN:   LIVER  Disease Etiology: Cirrhosis: Type B and D  Donor Type:    - Brain Death  CDC High Risk:   No  Donor CMV Status:   Donor CMV Status: Positive  Donor HBcAB:   Negative  Donor HCV Status:   Negative  Donor HBV SETH: Negative  Donor HCV SETH: Negative  Whole or Partial: Whole Liver  Biliary Anastomosis: End to End  Arterial Anatomy: Replaced Left Hepatic and Right Hepatic  Subjective:     History of Present Illness:  Ms. Reddy is a 69 y/o female with PMH of ESLD secondary Hep B and D.  Listed for liver transplant with MELD 23.  Paracentesis 10/1 with 5L removed, no fluid sent for analysis.  Morning labs significant for hyponatremia, Na 119.  Pt admitted to LTS for sodium monitoring.        Hospital Course:  Hospital Course: 69 y/o F h/o HBV/delta cirrhosis s/p DBDLT 10/5/18 (CMV D+/R+, steroid induction, MMF/tacro/pred maintenance) with take back on 10/6 2/2 hyperbilirubinema and bilious VALORIE drainage, no leak identified. Post-op course c/b hypercapneic and hypoxic respiratory failure and was reintubated though quickly extubated now doing well. Liver bx (10/6) sig for Zone 3 hemorrhage and collapse, in addition to cholestasis. Transferred from ICU on POD #4. ID consulted to comment on positive diphtheroid culture from ascitic fluid (likely skin colonization) as well as ESBL Klebsiella pneumoniae in urine culture (10/4).  Pt asymptomatic and cell count from ascitic fluid unremarkable. Per ID recs, no need to treat diphtheroids or asymptomatic ESBL Kleb pneumo bacteriuria though pt has had 6 days of therapy which would cover these organisms. Mild peritransplant ascites- repeat US 10/6 with increased arterial resistive indices. Repeat US 10/9  w/ continued elevation in RIs and fluid collections. LFTs trending down nicely.     Acute gradual worsening of renal function s/p OLTx, Nephrology consulted for post-op EVA. Creatinine on arrival 0.8 and trended up but pt remained with good UOP. Had stable response to Diuresis. Pt also with persistently elevated BUN, required multiple rounds of dialysis (10/15, 10/17, 10/20, 10/31), although not much improvement noted. Kidney function has since improved, with Cr now returned to baseline.     In addition, pt H/H cont to fluctuate requiring multiple blood transfusions (10/14, 10/16, 10/18, 10/19, 10/22, 10/27, 10/29).  Pt reported dark stools 10/15 but color has normalized. FOBT+. EGD 10/18 consistent with ulcerated mucosa in duodenum s/p coagulation. She was started on Protonix 40mg and completed course of amoxicillin/levofloxacin on 11/2 for concern of H. Pylori. Biopsy negative for infection and CMV. Transfuse as needed with goal Hb > 7.0.  Urine cx + Klebsiella pneumoniae (10/13). Received 3 day course of ciprofloxacin, dc'd 10/19. Patient finished 7 day course of Ertapenem for kleb pneumo ESBL UTI 11/29. Of note, pt grew same bacteria from wound 11/16. Purulent drainage noted from middle of chevron incision (11/16). Wound cx grew klebsiella p. CT scan showed no signs of infection. Area opened with wound vac placed 11/24, removed 12/7.  On 10/19 pm pt c/o crushing chest pain. Troponin 0.094, likely 2/2 ischemic stress. EKG NSR.     Liver US 11/6 showed no arterial flow. Repeat Liver US 11/8 showed arterial flow with increased RIs. 11/15 liver u/s showed abnormally decreased main hepatic artery peak systolic velocity with slow arterial flow and abnormal tardus parvus waveforms intrahepatically. Consulted vascular surgery. Repeat liver US 11/19 showed no arterial flow on R and sluggish flow on L. Hepatic angiogram 11/19 confirmed HAT. Liver US to assess recanalization post HAT 12/4 -> conclusion that pt may  recanalize. Will monitor closely with no further intervention planned at this time.     Pt with orthopnea. CT 11/26 showed large right and moderate left pleural effusions with adjacent basilar atelectasis. Pt sp02 stable on RA, but c/o some orthopnea. Chest xray and echo 12/9 with pleural effusions. Right thoracentesis with chest tube placement 12/11, fluid negative for infection. Chest tube removed, 12/13 w/o any issue. CXR obtained- no pneumo noted. Bilateral pleural effusion still present. Pt with continued worsening abdominal distention & LE edema. Multiple paracentesis (11/7, 11/12, 11/21- negative for infection) for ongoing ascites. 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 Milky appearance, cell count of fluid (51% lymphs), triglycerides 2208, consistent with chyle leak. Attempted low fat, high protein diet with no improvement seen in fluid character. 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 Peritoneal fluid 11/29 w/ Enterococcus. Per ID recs, treated with 10 day course of Vancomycin. Perc drain removed 12/17.     Of note, Valcyte held 10/31 for leukopenia. CMV PCR noted to be positive at 129 on 11/1. Held off on treatment with valcyte, however weekly CMV PCRs increased (12/6 with 529 copies), started treatment dose 12/9/18. Last CMV PCR 12/13 < 35 copies. Cont to monitor w/ weekly CMV PCR.     Interval history: No acute events overnight. LFTs remain stable. Pt with ongoing volume overload. Possible paracentesis by the end of the week, will cont to assess volume status daily. Cont to diurese w/ lasix BID. Remains on TPN. Pt transitioned to full diet. PO intake slowly improving.  She is overall doing well today. Cont to monitor.     Scheduled Meds:   aspirin  81 mg Oral Daily    atovaquone  1,500 mg Oral Daily    entecavir  0.5 mg Oral Daily    heparin (porcine)  5,000 Units Subcutaneous Q12H    insulin aspart U-100  3-5 Units Subcutaneous TIDWM    magnesium oxide  800 mg Oral  BID    metoprolol tartrate  25 mg Oral BID    octreotide  50 mcg Intravenous Q8H    pantoprazole  40 mg Oral BID    sodium chloride 0.9%  10 mL Intravenous Q6H    tacrolimus  2 mg Oral BID    valGANciclovir  450 mg Oral BID     Continuous Infusions:   insulin (HUMAN R) infusion (adults) 1.4 Units/hr (12/17/18 1803)    TPN ADULT CENTRAL LINE CUSTOM 42 mL/hr at 12/17/18 2037    TPN ADULT CENTRAL LINE CUSTOM       PRN Meds:sodium chloride, acetaminophen, bisacodyl, calcium carbonate, dextrose 50%, dextrose 50%, glucagon (human recombinant), glucose, glucose, hydrOXYzine HCl, insulin aspart U-100, ondansetron, ondansetron, polyethylene glycol, Flushing PICC Protocol **AND** sodium chloride 0.9% **AND** sodium chloride 0.9%, sodium chloride 0.9%, traMADol    Review of Systems   Constitutional: Positive for activity change and appetite change. Negative for chills and fever.   HENT: Negative for congestion and facial swelling.    Eyes: Negative for pain and discharge.   Respiratory: Negative for cough, chest tightness, shortness of breath and wheezing.    Cardiovascular: Positive for leg swelling. Negative for chest pain and palpitations.   Gastrointestinal: Positive for abdominal distention and abdominal pain. Negative for diarrhea, nausea and vomiting.   Endocrine: Negative.    Genitourinary: Negative for decreased urine volume and difficulty urinating.   Musculoskeletal: Negative for back pain.   Skin: Positive for wound.   Allergic/Immunologic: Positive for immunocompromised state.   Neurological: Positive for weakness.   Hematological: Negative.    Psychiatric/Behavioral: Positive for dysphoric mood and sleep disturbance.     Objective:     Vital Signs (Most Recent):  Temp: 98.6 °F (37 °C) (12/18/18 0845)  Pulse: 108 (12/18/18 0925)  Resp: 17 (12/18/18 0925)  BP: 131/83 (12/18/18 0925)  SpO2: 99 % (12/18/18 0925) Vital Signs (24h Range):  Temp:  [98 °F (36.7 °C)-99 °F (37.2 °C)] 98.6 °F (37 °C)  Pulse:   [104-118] 108  Resp:  [14-23] 17  SpO2:  [97 %-99 %] 99 %  BP: ()/(60-84) 131/83     Weight: 56.3 kg (124 lb 1.9 oz)  Body mass index is 21.99 kg/m².    Intake/Output - Last 3 Shifts       12/16 0700 - 12/17 0659 12/17 0700 - 12/18 0659 12/18 0700 - 12/19 0659    P.O. 720 420     I.V. (mL/kg) 36.1 (0.6) 31 (0.6)     .8 914.2     Total Intake(mL/kg) 1116.9 (19) 1365.2 (24.2)     Urine (mL/kg/hr) 0 (0)      Emesis/NG output       Drains 250 1710     Other       Stool 0      Blood       Total Output 250 1710     Net +866.9 -344.8            Urine Occurrence 4 x 2 x     Stool Occurrence 1 x 0 x     Emesis Occurrence  0 x           Physical Exam   Constitutional: She is oriented to person, place, and time. She appears well-developed.   Hand and temporal muscle wasting   HENT:   Head: Normocephalic.   Eyes: EOM are normal. Pupils are equal, round, and reactive to light. Scleral icterus is present.   Neck: Normal range of motion. Neck supple. No JVD present.   Cardiovascular: Regular rhythm, normal heart sounds and intact distal pulses. Tachycardia present.   Pulmonary/Chest: Effort normal. She has decreased breath sounds in the right lower field and the left lower field.   Abdominal: Soft. Bowel sounds are normal. She exhibits distension and ascites.       Chevron incision healing  Perc drain removed, RLQ stitch in place     Musculoskeletal: Normal range of motion. She exhibits edema.   Neurological: She is alert and oriented to person, place, and time.   Skin: Skin is warm and dry.   Psychiatric: She has a normal mood and affect. Her behavior is normal.   Nursing note and vitals reviewed.      Laboratory:  Immunosuppressants         Stop Route Frequency     tacrolimus capsule 2 mg      -- Oral 2 times daily        CBC:   Recent Labs   Lab 12/18/18  0600   WBC 3.21*   RBC 2.49*   HGB 7.5*   HCT 23.8*   *   MCV 96   MCH 30.1   MCHC 31.5*     CMP:   Recent Labs   Lab 12/18/18  0600   *   CALCIUM  "7.9*   ALBUMIN 2.7*   PROT 4.9*      K 4.2   CO2 28      BUN 38*   CREATININE 0.9   ALKPHOS 182*   ALT 18   AST 20   BILITOT 1.1*     Labs within the past 24 hours have been reviewed.    Diagnostic Results:  I have personally reviewed all pertinent imaging studies.    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   - POD 1 US: increased arterial resistive indices, suggestive of edema vs acute rejection vs congestion.  - Repeat US 10/9 with continued elevation of RIs.  - LFTs stable.  - T bili trending down.   - Repeat liver US (10/11): elevated RIs.  - Liver US 11/6 to assess for ascites. No arterial flow within the liver allograft concerning for interval thrombosis/occlusion of the arterial system. Severe ascites seen.  - Liver US 11/8: arterial flow seen w/ elevated RIs   - LFTs remain stable. No CTA at this time due to EVA and normal liver function.   - Liver US 11/15 showed "abnormally decreased main hepatic artery peak systolic velocity with slow arterial flow and abnormal tardus parvus waveforms intrahepatically."   -Vascular surgery consulted  - Liver US 11/19 showed no arterial flow on the right and sluggish flow on the left.   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 No further intervention planned at this time as patient's LFTs are stable. Will monitor.  - CT abd/pelvis 11/26 showed: 1. Persistent large volume of ascites. 2. Large right and moderate left pleural effusions with adjacent basilar atelectasis. 3. Anasarca  - IR placed perc drain 11/29 due to suspicion for chyle leak. 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 Octreotide dc'd 12/18  - Fluid also growing enterococcus - treatment with Vanc x 10 days per ID recs, completed.   - perc drain " removed 12/17     Sleeps in sitting position due to orthopnea    - obtain chest xray and echo.  - thoracentesis with chest tube placement 12/11/18, removed 12/13. No pneumo noted on CXR.       Hypoalbuminemia    - Continue to monitor.     Sequelae of hyperalimentation    - Monitor.       Pleural effusion    - CT 11/26 showed: Large right and moderate left pleural effusions with adjacent basilar atelectasis.  - Pt sp02 stable on RA, but c/o some orthopnea.  - CT abd/pelvis 12/3 showed unchanged b/l pleural effusions (large on R, moderate on L) with bibasilar compressive atelectasis and consolidative changes.  - Sp02 stable & no signs of respiratory infection.  - repeat echo and chest xray with pleural effusion.  - thoracentesis with chest tube placement 12/11/18 - fluid negative for infection.  - CXR 12/12 w/ improvements noted.  - chest tube removed 12/13- CXR w/ no pneumo. Bilateral pleural effusion still present.  - Monitor     Hypomagnesemia    - Continue PO Mag Oxide.  - Continue to monitor.       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     Hypophosphatemia    - monitor.       Delayed surgical wound healing    - Middle chevron incision opened 11/16 with purulent drainage.  - Packed with Aquacel ag at this time. Growing Klebsiella p.   - CT abd/pelvis 11/17- no signs of infection.  - 11/17: R side of chevron incision w/ 3 small holes draining purulent fluid, opened and packed w/ aquacel.   - Wound care following.  - Wound w/ increased drainage, so wound care placed vac 11/24/18.  Apprec recs. Extending Ertapenem thru 11/28 per ID.    - Abdomen erythematous and indurated RLQ inferior to chevron.  - CT abd/pelvis 11/26 - no sign of infection.  - Last wound vac change 12/7 with improvements noted in wound - depth now 0.5cm.   - No longer needs vac per wound care.     Hepatic artery thrombosis of transplanted liver    - Liver US 11/19 showed no arterial flow to R and  "sluggish flow to L.  - Hepatic angiogram 11/19 confirmed HAT.  - Liver US 12/4 to evaluate for recanalization reviewed. Recanalization possible. Will continue to monitor.  - No further intervention planned at this time. Will continue to monitor.     Drug-induced pancytopenia    - Decreased plt, wbc, h/h.  - See "leukopenia"  - Continue to monitor.     Thrombocytopenia, unspecified    - Monitor.       Leukopenia    - Stopped Cellcept, Bactrim, and Valcyte 10/31.  - Cellcept half dose resumed 11/6  - Started Atovaquone 11/6.   - CMV PCR noted to be positive at 129 (11/1). Held off on treatment with valcyte, however weekly CMV PCRs increasing, start treatment dose 12/9/18.  - Cellcept held 11/23 2/2 infection (UTI + wound).         At risk for opportunistic infections    - Valcyte for CMV prophylaxis--> held 10/31 for leukopenia. Pt will need weekly CMV PCR while discontinued.  - CMV PCR noted to be positive at 129.  - CMV PCR 11/8: 105  - CMV PCR 11/15 with 89 copies  - CMV PCR 11/23 95 - increased from previous. Continue to hold Valcyte & monitor pt closely.  - CMV PCR 11/29 146.  - CMV PCR 12/6 529 - start treatment dose valcyte 12/9/18  - CMV PCR 12/13 < 35.  - Bactrim for PCP prophylaxis (MWF).--> holding (10/31) for leukopenia.   - Started Atovaquone 11/6.  - Fluconazole for fungal prophylaxis (first dose 10/6). D/c Fluc 11/16 bc pt had been on it for > 30 days post take back.       Long-term use of immunosuppressant medication    - Prograf: stopped 10/29. Resumed 11/2.  --> Will monitor for signs of toxic side effects, check daily troughs, and change meds accordingly.   - Prednisone: stopped 10/29. Started on Hydrocortisone. pred taper resumed.  - Cellcept: stopped 10/31 for leukopenia. Restarted Cellcept 500 mg bid, 11/6.  - Cellcept held 11/23 2/2 infection.         Prophylactic immunotherapy    - See long term use immunosuppresion.       Other ascites    - Paracentesis 10/1, 5L removed, no fluid sent for " analysis.  - Pt remains volume overloaded.   - Liver US 11/6 with severe ascites.   - Paracentesis 11/7 with 8.9 L removed. Wbc 39, segs 16%. Gram stain - no organisms seen and no WBC. Cultures NGTD.  - Paracentesis 11/12: 8.6 L removed. 34 wbc, 25% segs, 33 lymphs, 42 monocytes/macrophages. Cultures NGTD  - Paracentesis 11/21 with 4 L removed, neg for infection.  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 Milky appearance, cell count of fluid (51% lymphs), triglycerides 2208, consistent with chyle leak.   - Peritoneal fluid 11/29 growing enterococcus - treatment with Vanc x 10 days (completed 12/9) per ID recs.   - Attempted low fat, high protein diet due to suspicion for chyle leak with no improvement seen in fluid character.  - CT abd/pelvis 12/3 shows small volume abdominal ascites, improved compared to prior, pelvis ascites unchanged from prior, unchanged b/l pleural effusions (large on R, moderate on L) with bibasilar compressive atelectasis and consolidative changes.  - Placed PICC line and started TPN + octreotide 12/3. Improvement in color of fluid seen 12/6 - 12/7 (less milky, more abbie).   - continue TPN.  - Lasix 20 mg iv x 1 12/11, 12/12, 12/13, 12/14, 12/16  - Lasix 40 mg IV BID today.    - Perc drain removed 12/17       Physical deconditioning    - PT/OT consulted.  Progressing well w/ therapy.  - Recommend home health PT and rolling walker at discharge (orders placed 11/6).       Anemia requiring transfusions    - Fluctuating H&H.    - Transfusions: 10/14; 10/16; 10/18; 10/19; 10/22; 10/27; 10/29, 10/31, 11/3, 11/21, 11/29, 12/4, 12/13.  - FOBT +  - LDH elevated, Hapto < 10.  - EGD 10/18: ulcerated duodenal mucosa.   - Consulted GI about this ongoing issue and further need of another EGD.  - EGD 11/5 unremarkable.   - Iron studies 11/13:  95, TIBC 107, sat iron 89, transferrin 72.  - Hemolytic anemia workup 11/13: Haptoglobin 132, retic 3.9 .  - 12/13 - transfused 1u  PRBC, minimal improvement in H/H.  - No overt signs of bleeding.  - Continue to monitor.     Severe malnutrition    - Dietary consulted.   - Temporal and distal extremity muscle wasting and edema.  - Encourage supplements and to increase nutritional intake.  - Per nephrology, pt to be on low protein diet.  - Prealbumin reviewed.   - Tolerating diet.  Denies n/v.    - Attempted low fat, high protein diet due to suspicion for chyle leak with no improvement seen in fluid character.  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 Had advanced diet with return of milky drainage,   - cont on TPN - pt now tolerating full diet. PO intake improving. Monitor      Type 2 diabetes mellitus with diabetic polyneuropathy, with long-term current use of insulin    - Continue home regimen.  - TPN started 12/3 and pt now on insulin drip.  - Endocrine following. Appreciate recs.       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  - Tx w/ Entecavir- dose changed to daily 11/26 due to improved renal fx.  - 12/3 Hep B surface antigen negative 12/3, surface AB quant 571, and Hep B DNA <10.  - Continue to monitor, per protocol.         VTE Risk Mitigation (From admission, onward)        Ordered     heparin (porcine) injection 5,000 Units  Every 12 hours      12/06/18 1058     IP VTE HIGH RISK PATIENT  Once      11/05/18 1145     Place sequential compression device  Until discontinued      10/05/18 0709          The patients clinical status was discussed at multidisplinary rounds, involving transplant surgery, transplant medicine, pharmacy, nursing, nutrition, and social work    Discharge Planning:  No Patient Care Coordination Note on file.      Jihan Soares PA-C  Liver Transplant  [facility]

## 2018-12-18 NOTE — PROGRESS NOTES
"[facility]  Endocrinology  Progress Note    Admit Date: 10/1/2018     Reason for Consult: Management of type 2 DM, Hyperglycemia      Surgical Procedure and Date: Liver transplant 10/5/18, Ex lap 10/6/18     Diabetes diagnosis year:      Home Diabetes Medications:  Lantus 18 units HS and novolog 17 units with meals plus correction scale (150-200 +1)        Lab Results   Component Value Date     HGBA1C 6.8 (H) 2018        How often checking glucose at home? 3-4   BG readings on regimen: 120-150.  Post prandial readings as high as upper 200s  Hypoglycemia on the regimen? yes, none recently   Missed doses on regimen?  No     Diabetes Complications include:     Diabetic peripheral neuropathy      Complicating diabetes co morbidities:   CIRRHOSIS        HPI:  Patient is a 68 y.o. female with a diagnosis of ESLD, listed for liver transplant with meld 23 who underwent live transplant on 10/5/18.  Back to OR on 10/6/18 for ex lap.  Admitted 10/1/18 for hyponatremia s/p paracentesis.  Personal has known diagnosis of diabetes, endocrine consulted for diabetes management.    Post-operative course complicated by ESBL Klebsiella pneumoniae in urine (in contact isolation), peritransplant ascites, worsening renal function (required temporary dialysis), anemia (multiple blood transfusions), and possible GI bleed.     Interval HPI:   Overnight events: Remains in TSU. NAEON. BG slightly above goal on insulin infusion at 1.4 u/hr with scheduled insulin and correction scale.   Eatin% - 100%  Nausea: No  Hypoglycemia and intervention: No  Fever: No  TPN:  42 cc/hr      /83   Pulse 108   Temp 98.6 °F (37 °C) (Oral)   Resp 17   Ht 5' 3" (1.6 m)   Wt 56.3 kg (124 lb 1.9 oz)   LMP  (LMP Unknown)   SpO2 99%   Breastfeeding? No   BMI 21.99 kg/m²      Labs Reviewed and Include    Recent Labs   Lab 18  0600   *   CALCIUM 7.9*   ALBUMIN 2.7*   PROT 4.9*      K 4.2   CO2 28 "      BUN 38*   CREATININE 0.9   ALKPHOS 182*   ALT 18   AST 20   BILITOT 1.1*     Lab Results   Component Value Date    WBC 3.21 (L) 12/18/2018    HGB 7.5 (L) 12/18/2018    HCT 23.8 (L) 12/18/2018    MCV 96 12/18/2018     (L) 12/18/2018     No results for input(s): TSH, FREET4 in the last 168 hours.  Lab Results   Component Value Date    HGBA1C 6.0 (H) 10/04/2018       Nutritional status:   Body mass index is 21.99 kg/m².  Lab Results   Component Value Date    ALBUMIN 2.7 (L) 12/18/2018    ALBUMIN 2.3 (L) 12/17/2018    ALBUMIN 2.5 (L) 12/16/2018     Lab Results   Component Value Date    PREALBUMIN 13 (L) 12/10/2018    PREALBUMIN 15 (L) 11/03/2018    PREALBUMIN 12 (L) 09/17/2018       Estimated Creatinine Clearance: 48.8 mL/min (based on SCr of 0.9 mg/dL).    Accu-Checks  Recent Labs     12/16/18  1219 12/16/18  1721 12/16/18  2101 12/17/18  0207 12/17/18  0817 12/17/18  1214 12/17/18  1755 12/17/18  2232 12/18/18  0207 12/18/18  0904   POCTGLUCOSE 310* 325* 125* 177* 212* 199* 223* 238* 204* 211*       Current Medications and/or Treatments Impacting Glycemic Control  Immunotherapy:    Immunosuppressants         Stop Route Frequency     tacrolimus capsule 2 mg      -- Oral 2 times daily        Steroids:   Hormones (From admission, onward)    Start     Stop Route Frequency Ordered    12/11/18 2200  octreotide injection 50 mcg      -- IV Every 8 hours 12/11/18 1531    10/17/18 0945  predniSONE tablet 15 mg      10/24 0859 Oral Daily 10/17/18 0943        Pressors:    Autonomic Drugs (From admission, onward)    Start     Stop Route Frequency Ordered    10/06/18 1617  rocuronium (ZEMURON) 10 mg/mL injection     Comments:  Created by cabinet override    10/07 0429   10/06/18 1617        Hyperglycemia/Diabetes Medications:   Antihyperglycemics (From admission, onward)    Start     Stop Route Frequency Ordered    12/17/18 1130  insulin aspart U-100 pen 3-5 Units      -- SubQ 3 times daily with meals 12/17/18  0805    12/14/18 1700  insulin regular (Humulin R) 100 Units in sodium chloride 0.9% 100 mL infusion      -- IV Continuous 12/14/18 1555    12/14/18 1655  insulin aspart U-100 pen 0-4 Units      -- SubQ As needed (PRN) 12/14/18 1555    10/07/18 1200  insulin regular (Humulin R) 100 Units in sodium chloride 0.9% 100 mL infusion      10/13 2100 IV Continuous 10/07/18 1055          ASSESSMENT and PLAN    * Liver transplanted    Managed per primary team  Avoid hypoglycemia    Recent Labs   Lab 12/18/18  0600   ALT 18   AST 20   ALKPHOS 182*   BILITOT 1.1*   PROT 4.9*   ALBUMIN 2.7*            Type 2 diabetes mellitus with diabetic polyneuropathy, with long-term current use of insulin    BG goal 140 - 180     Transition IV insulin infusion at 1.4 u/hr  Increase Novolog to 4-6 units with meals  Low dose correction  BG monitoring AC/HS/0200    Discharge planning: TBD         Sequelae of hyperalimentation    On TPN, elevating BG readings       Chylous ascites    Paracentesis prn per primary team.  Avoid hypoglycemia  TPN started- NPO     Prophylactic immunotherapy    May increase insulin resistance.          Severe malnutrition    On TPN. May elevate BG         Aviva Caldera, NP  Endocrinology  [facility]

## 2018-12-18 NOTE — ASSESSMENT & PLAN NOTE
- Paracentesis 10/1, 5L removed, no fluid sent for analysis.  - Pt remains volume overloaded.   - Liver US 11/6 with severe ascites.   - Paracentesis 11/7 with 8.9 L removed. Wbc 39, segs 16%. Gram stain - no organisms seen and no WBC. Cultures NGTD.  - Paracentesis 11/12: 8.6 L removed. 34 wbc, 25% segs, 33 lymphs, 42 monocytes/macrophages. Cultures NGTD  - Paracentesis 11/21 with 4 L removed, neg for infection.  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 Milky appearance, cell count of fluid (51% lymphs), triglycerides 2208, consistent with chyle leak.   - Peritoneal fluid 11/29 growing enterococcus - treatment with Vanc x 10 days (completed 12/9) per ID recs.   - Attempted low fat, high protein diet due to suspicion for chyle leak with no improvement seen in fluid character.  - CT abd/pelvis 12/3 shows small volume abdominal ascites, improved compared to prior, pelvis ascites unchanged from prior, unchanged b/l pleural effusions (large on R, moderate on L) with bibasilar compressive atelectasis and consolidative changes.  - Placed PICC line and started TPN + octreotide 12/3. Improvement in color of fluid seen 12/6 - 12/7 (less milky, more abbie).   - continue TPN.  - Lasix 20 mg iv x 1 12/11, 12/12, 12/13, 12/14, 12/16  - Lasix 40 mg IV BID today.    - Perc drain removed 12/17

## 2018-12-18 NOTE — ASSESSMENT & PLAN NOTE
Managed per primary team  Avoid hypoglycemia    Recent Labs   Lab 12/18/18  0600   ALT 18   AST 20   ALKPHOS 182*   BILITOT 1.1*   PROT 4.9*   ALBUMIN 2.7*

## 2018-12-19 LAB
ABO + RH BLD: NORMAL
ALBUMIN SERPL BCP-MCNC: 2.5 G/DL
ALP SERPL-CCNC: 195 U/L
ALT SERPL W/O P-5'-P-CCNC: 19 U/L
ANION GAP SERPL CALC-SCNC: 10 MMOL/L
AST SERPL-CCNC: 21 U/L
BASOPHILS # BLD AUTO: 0.02 K/UL
BASOPHILS NFR BLD: 0.6 %
BILIRUB SERPL-MCNC: 0.9 MG/DL
BLD GP AB SCN CELLS X3 SERPL QL: NORMAL
BLD PROD TYP BPU: NORMAL
BLOOD UNIT EXPIRATION DATE: NORMAL
BLOOD UNIT TYPE CODE: 6200
BLOOD UNIT TYPE: NORMAL
BUN SERPL-MCNC: 47 MG/DL
CALCIUM SERPL-MCNC: 8.1 MG/DL
CHLORIDE SERPL-SCNC: 99 MMOL/L
CO2 SERPL-SCNC: 27 MMOL/L
CODING SYSTEM: NORMAL
CREAT SERPL-MCNC: 1 MG/DL
DIFFERENTIAL METHOD: ABNORMAL
DISPENSE STATUS: NORMAL
EOSINOPHIL # BLD AUTO: 0.1 K/UL
EOSINOPHIL NFR BLD: 2.5 %
ERYTHROCYTE [DISTWIDTH] IN BLOOD BY AUTOMATED COUNT: 17.9 %
EST. GFR  (AFRICAN AMERICAN): >60 ML/MIN/1.73 M^2
EST. GFR  (NON AFRICAN AMERICAN): 57.6 ML/MIN/1.73 M^2
GLUCOSE SERPL-MCNC: 260 MG/DL
HCT VFR BLD AUTO: 22.7 %
HGB BLD-MCNC: 7.2 G/DL
IMM GRANULOCYTES # BLD AUTO: 0.05 K/UL
IMM GRANULOCYTES NFR BLD AUTO: 1.6 %
LYMPHOCYTES # BLD AUTO: 0.6 K/UL
LYMPHOCYTES NFR BLD: 17.5 %
MAGNESIUM SERPL-MCNC: 1.8 MG/DL
MCH RBC QN AUTO: 30.6 PG
MCHC RBC AUTO-ENTMCNC: 31.7 G/DL
MCV RBC AUTO: 97 FL
MONOCYTES # BLD AUTO: 0.1 K/UL
MONOCYTES NFR BLD: 2.9 %
NEUTROPHILS # BLD AUTO: 2.4 K/UL
NEUTROPHILS NFR BLD: 74.9 %
NRBC BLD-RTO: 0 /100 WBC
PHOSPHATE SERPL-MCNC: 2.8 MG/DL
PLATELET # BLD AUTO: 99 K/UL
PMV BLD AUTO: 9.4 FL
POCT GLUCOSE: 197 MG/DL (ref 70–110)
POCT GLUCOSE: 218 MG/DL (ref 70–110)
POCT GLUCOSE: 280 MG/DL (ref 70–110)
POTASSIUM SERPL-SCNC: 3.6 MMOL/L
PROT SERPL-MCNC: 4.8 G/DL
RBC # BLD AUTO: 2.35 M/UL
SODIUM SERPL-SCNC: 136 MMOL/L
TACROLIMUS BLD-MCNC: 3.9 NG/ML
TRANS ERYTHROCYTES VOL PATIENT: NORMAL ML
WBC # BLD AUTO: 3.15 K/UL

## 2018-12-19 PROCEDURE — 25000003 PHARM REV CODE 250: Performed by: NURSE PRACTITIONER

## 2018-12-19 PROCEDURE — 80197 ASSAY OF TACROLIMUS: CPT

## 2018-12-19 PROCEDURE — 80053 COMPREHEN METABOLIC PANEL: CPT

## 2018-12-19 PROCEDURE — 63600175 PHARM REV CODE 636 W HCPCS: Performed by: NURSE PRACTITIONER

## 2018-12-19 PROCEDURE — 99233 SBSQ HOSP IP/OBS HIGH 50: CPT | Mod: 24,,, | Performed by: PHYSICIAN ASSISTANT

## 2018-12-19 PROCEDURE — 86902 BLOOD TYPE ANTIGEN DONOR EA: CPT

## 2018-12-19 PROCEDURE — 20600001 HC STEP DOWN PRIVATE ROOM

## 2018-12-19 PROCEDURE — 99232 SBSQ HOSP IP/OBS MODERATE 35: CPT | Mod: ,,, | Performed by: NURSE PRACTITIONER

## 2018-12-19 PROCEDURE — 63600175 PHARM REV CODE 636 W HCPCS: Performed by: TRANSPLANT SURGERY

## 2018-12-19 PROCEDURE — 97530 THERAPEUTIC ACTIVITIES: CPT

## 2018-12-19 PROCEDURE — 25000003 PHARM REV CODE 250: Performed by: TRANSPLANT SURGERY

## 2018-12-19 PROCEDURE — 85025 COMPLETE CBC W/AUTO DIFF WBC: CPT

## 2018-12-19 PROCEDURE — 83735 ASSAY OF MAGNESIUM: CPT

## 2018-12-19 PROCEDURE — 99900035 HC TECH TIME PER 15 MIN (STAT)

## 2018-12-19 PROCEDURE — 63600175 PHARM REV CODE 636 W HCPCS: Performed by: PHYSICIAN ASSISTANT

## 2018-12-19 PROCEDURE — A4216 STERILE WATER/SALINE, 10 ML: HCPCS | Performed by: TRANSPLANT SURGERY

## 2018-12-19 PROCEDURE — 86922 COMPATIBILITY TEST ANTIGLOB: CPT

## 2018-12-19 PROCEDURE — P9021 RED BLOOD CELLS UNIT: HCPCS

## 2018-12-19 PROCEDURE — 25000003 PHARM REV CODE 250: Performed by: PHYSICIAN ASSISTANT

## 2018-12-19 PROCEDURE — 94664 DEMO&/EVAL PT USE INHALER: CPT

## 2018-12-19 PROCEDURE — 86901 BLOOD TYPING SEROLOGIC RH(D): CPT

## 2018-12-19 PROCEDURE — 84100 ASSAY OF PHOSPHORUS: CPT

## 2018-12-19 PROCEDURE — 36430 TRANSFUSION BLD/BLD COMPNT: CPT

## 2018-12-19 RX ORDER — HYDROCODONE BITARTRATE AND ACETAMINOPHEN 500; 5 MG/1; MG/1
TABLET ORAL
Status: DISCONTINUED | OUTPATIENT
Start: 2018-12-19 | End: 2018-12-20

## 2018-12-19 RX ORDER — HEPARIN SODIUM 5000 [USP'U]/ML
5000 INJECTION, SOLUTION INTRAVENOUS; SUBCUTANEOUS EVERY 12 HOURS
Status: COMPLETED | OUTPATIENT
Start: 2018-12-19 | End: 2018-12-19

## 2018-12-19 RX ADMIN — Medication 10 ML: at 12:12

## 2018-12-19 RX ADMIN — INSULIN ASPART 1 UNITS: 100 INJECTION, SOLUTION INTRAVENOUS; SUBCUTANEOUS at 05:12

## 2018-12-19 RX ADMIN — VALGANCICLOVIR 450 MG: 450 TABLET, FILM COATED ORAL at 09:12

## 2018-12-19 RX ADMIN — ENTECAVIR 0.5 MG: 0.5 TABLET ORAL at 09:12

## 2018-12-19 RX ADMIN — MAGNESIUM OXIDE TAB 400 MG (241.3 MG ELEMENTAL MG) 800 MG: 400 (241.3 MG) TAB at 09:12

## 2018-12-19 RX ADMIN — INSULIN ASPART 1 UNITS: 100 INJECTION, SOLUTION INTRAVENOUS; SUBCUTANEOUS at 01:12

## 2018-12-19 RX ADMIN — INSULIN ASPART 6 UNITS: 100 INJECTION, SOLUTION INTRAVENOUS; SUBCUTANEOUS at 01:12

## 2018-12-19 RX ADMIN — METOPROLOL TARTRATE 25 MG: 25 TABLET ORAL at 09:12

## 2018-12-19 RX ADMIN — FUROSEMIDE 40 MG: 40 TABLET ORAL at 09:12

## 2018-12-19 RX ADMIN — INSULIN ASPART 6 UNITS: 100 INJECTION, SOLUTION INTRAVENOUS; SUBCUTANEOUS at 05:12

## 2018-12-19 RX ADMIN — INSULIN ASPART 2 UNITS: 100 INJECTION, SOLUTION INTRAVENOUS; SUBCUTANEOUS at 08:12

## 2018-12-19 RX ADMIN — ASPIRIN 81 MG CHEWABLE TABLET 81 MG: 81 TABLET CHEWABLE at 09:12

## 2018-12-19 RX ADMIN — FUROSEMIDE 40 MG: 40 TABLET ORAL at 05:12

## 2018-12-19 RX ADMIN — PANTOPRAZOLE SODIUM 40 MG: 40 TABLET, DELAYED RELEASE ORAL at 09:12

## 2018-12-19 RX ADMIN — INSULIN ASPART 6 UNITS: 100 INJECTION, SOLUTION INTRAVENOUS; SUBCUTANEOUS at 08:12

## 2018-12-19 RX ADMIN — ATOVAQUONE 1500 MG: 750 SUSPENSION ORAL at 09:12

## 2018-12-19 RX ADMIN — TACROLIMUS 2 MG: 1 CAPSULE ORAL at 08:12

## 2018-12-19 RX ADMIN — HEPARIN SODIUM 5000 UNITS: 5000 INJECTION, SOLUTION INTRAVENOUS; SUBCUTANEOUS at 09:12

## 2018-12-19 RX ADMIN — INSULIN ASPART 2 UNITS: 100 INJECTION, SOLUTION INTRAVENOUS; SUBCUTANEOUS at 01:12

## 2018-12-19 RX ADMIN — TACROLIMUS 2 MG: 1 CAPSULE ORAL at 05:12

## 2018-12-19 RX ADMIN — SODIUM CHLORIDE 1 UNITS/HR: 9 INJECTION, SOLUTION INTRAVENOUS at 01:12

## 2018-12-19 NOTE — ASSESSMENT & PLAN NOTE
- Continue home regimen.  - TPN started 12/3 and currently on insulin drip.  - Endocrine following. Appreciate recs.

## 2018-12-19 NOTE — PROGRESS NOTES
" (TIMBO) presented to the patient's (pt) room for follow up and continuity of care.  Pt was observed to be sitting in the recliner, eating an orange, alert and oriented, and communicating appropriately through Dunia who was present to assist with translation.  Pt's  was also present and communicated effectively through Dunia as well.  Pt stated she needed to speak with the dietician again to discuss an appropriate diet as she had been informed by the medical team to improve her protein intake.  TIMBO advised she would locate the dietician to ask that she meets with the pt today.  Dunia provided her cell phone number ([phone]) and advised the dietician could contact her to discuss the pt's concerns.  TIMBO inquired about the pt's current level of coping and if there were any concerns which needed to be addressed.  Pt stated she continues to feel weak from time to time; however, she is managing very well at this time.  The pt's children will not be returning in the near future; however, the pt's  receives caretaker assistance from their hired sitter, Bobbi, when needed.  Pt's  inquired if Bobbi needed to continue assisting him three times per week since everything seemed to be going smoothly.  TIMBO acknowledged pt's 's questions and inquired if Bobbi was available by phone as needed; therefore, pt's  and Dunia advised she was.  TIMBO advised the additional assistance was discussed in the beginning due to the pt needing an enormous amount of care. Allowing the sitter to assist on a "call when needed" basis would be a decision made by the pt and her  since the pt appears to be doing well and as the pt the potential to discharge from the hospital on Friday. Pt and pt's  verbalized understanding of the information explained.  Dunia advised the pt's wound vac had never been opened and/or used and inquired what the pt should do with the medical equipment.  TIMBO advised " pt to keep the device for now until any other information had been obtained.  No other concerns or questions were addressed. SW remains available for education, resources, support and discharge planning as appropriate.

## 2018-12-19 NOTE — ASSESSMENT & PLAN NOTE
- Fluctuating H&H.    - Transfusions: 10/14; 10/16; 10/18; 10/19; 10/22; 10/27; 10/29, 10/31, 11/3, 11/21, 11/29, 12/4, 12/13.  - FOBT +  - LDH elevated, Hapto < 10.  - EGD 10/18: ulcerated duodenal mucosa.   - Consulted GI about this ongoing issue and further need of another EGD.  - EGD 11/5 unremarkable.   - Iron studies 11/13:  95, TIBC 107, sat iron 89, transferrin 72.  - Hemolytic anemia workup 11/13: Haptoglobin 132, retic 3.9 .  - 12/13 - transfused 1u PRBC, minimal improvement in H/H.  - 12/20- 1u pRBC transfused  - No overt signs of bleeding.  - Continue to monitor.

## 2018-12-19 NOTE — SUBJECTIVE & OBJECTIVE
Scheduled Meds:   aspirin  81 mg Oral Daily    atovaquone  1,500 mg Oral Daily    entecavir  0.5 mg Oral Daily    furosemide  40 mg Oral BID loop    heparin (porcine)  5,000 Units Subcutaneous Q12H    insulin aspart U-100  4-6 Units Subcutaneous TIDWM    magnesium oxide  800 mg Oral BID    metoprolol tartrate  25 mg Oral BID    pantoprazole  40 mg Oral BID    sodium chloride 0.9%  10 mL Intravenous Q6H    tacrolimus  2 mg Oral BID    valGANciclovir  450 mg Oral BID     Continuous Infusions:   insulin (HUMAN R) infusion (adults) 1.2 Units/hr (12/19/18 1045)    TPN ADULT CENTRAL LINE CUSTOM 21 mL/hr at 12/19/18 1045     PRN Meds:sodium chloride, sodium chloride, acetaminophen, bisacodyl, calcium carbonate, dextrose 50%, dextrose 50%, glucagon (human recombinant), glucose, glucose, hydrOXYzine HCl, insulin aspart U-100, ondansetron, ondansetron, polyethylene glycol, Flushing PICC Protocol **AND** sodium chloride 0.9% **AND** sodium chloride 0.9%, sodium chloride 0.9%, traMADol    Review of Systems   Constitutional: Positive for activity change, appetite change (improving) and fatigue. Negative for chills and fever.   HENT: Negative for congestion and facial swelling.    Eyes: Negative for pain and discharge.   Respiratory: Negative for cough, chest tightness, shortness of breath and wheezing.    Cardiovascular: Positive for leg swelling. Negative for chest pain and palpitations.   Gastrointestinal: Positive for abdominal distention and abdominal pain. Negative for diarrhea, nausea and vomiting.   Endocrine: Negative.    Genitourinary: Negative for decreased urine volume and difficulty urinating.   Musculoskeletal: Negative for back pain.   Skin: Positive for wound.   Allergic/Immunologic: Positive for immunocompromised state.   Neurological: Positive for weakness.   Hematological: Negative.    Psychiatric/Behavioral: Positive for dysphoric mood and sleep disturbance.     Objective:     Vital Signs (Most  Recent):  Temp: 98.4 °F (36.9 °C) (12/19/18 1109)  Pulse: 109 (12/19/18 0815)  Resp: 19 (12/19/18 0815)  BP: 112/70 (12/19/18 0815)  SpO2: 99 % (12/19/18 0815) Vital Signs (24h Range):  Temp:  [98.2 °F (36.8 °C)-99.1 °F (37.3 °C)] 98.4 °F (36.9 °C)  Pulse:  [] 109  Resp:  [14-24] 19  SpO2:  [96 %-99 %] 99 %  BP: (106-143)/(65-88) 112/70     Weight: 57.6 kg (126 lb 15.8 oz)  Body mass index is 22.49 kg/m².    Intake/Output - Last 3 Shifts       12/17 0700 - 12/18 0659 12/18 0700 - 12/19 0659 12/19 0700 - 12/20 0659    P.O. 420 650     I.V. (mL/kg) 31 (0.6)      .2      Total Intake(mL/kg) 1365.2 (24.2) 650 (11.3)     Urine (mL/kg/hr)  1000 (0.7)     Drains 1710      Stool  0     Total Output 1710 1000     Net -344.8 -350            Urine Occurrence 2 x 2 x     Stool Occurrence 0 x 1 x     Emesis Occurrence 0 x            Physical Exam   Constitutional: She is oriented to person, place, and time. She appears well-developed.   Hand and temporal muscle wasting   HENT:   Head: Normocephalic.   Eyes: EOM are normal. Pupils are equal, round, and reactive to light. No scleral icterus.   Neck: Normal range of motion. Neck supple. No JVD present.   Cardiovascular: Regular rhythm, normal heart sounds and intact distal pulses. Tachycardia present.   Pulmonary/Chest: Effort normal and breath sounds normal. No respiratory distress.   Abdominal: Soft. Bowel sounds are normal. She exhibits distension and ascites. There is no tenderness.       Chevron incision healing  Prior perc drain site w/ RLQ stitch x 1      Musculoskeletal: Normal range of motion. She exhibits edema.   Neurological: She is alert and oriented to person, place, and time.   Skin: Skin is warm and dry.   Psychiatric: She has a normal mood and affect. Her behavior is normal.   Nursing note and vitals reviewed.      Laboratory:  Immunosuppressants         Stop Route Frequency     tacrolimus capsule 2 mg      -- Oral 2 times daily        CBC:   Recent  Labs   Lab 12/19/18  0530   WBC 3.15*   RBC 2.35*   HGB 7.2*   HCT 22.7*   PLT 99*   MCV 97   MCH 30.6   MCHC 31.7*     CMP:   Recent Labs   Lab 12/19/18  0530   *   CALCIUM 8.1*   ALBUMIN 2.5*   PROT 4.8*      K 3.6   CO2 27   CL 99   BUN 47*   CREATININE 1.0   ALKPHOS 195*   ALT 19   AST 21   BILITOT 0.9     Labs within the past 24 hours have been reviewed.    Diagnostic Results:  I have personally reviewed all pertinent imaging studies.

## 2018-12-19 NOTE — PROGRESS NOTES
[facility]  Liver Transplant  Progress Note    Patient Name: Scarlett Reddy  MRN: [id number]  Admission Date: 10/1/2018   Hospital Length of Stay: 78 days  Code Status: Full Code  Primary Care Provider: Primary Doctor No  Post-Operative Day: 75    ORGAN:   LIVER  Disease Etiology: Cirrhosis: Type B and D  Donor Type:    - Brain Death  CDC High Risk:   No  Donor CMV Status:   Donor CMV Status: Positive  Donor HBcAB:   Negative  Donor HCV Status:   Negative  Donor HBV SETH: Negative  Donor HCV SETH: Negative  Whole or Partial: Whole Liver  Biliary Anastomosis: End to End  Arterial Anatomy: Replaced Left Hepatic and Right Hepatic  Subjective:     History of Present Illness:  Ms. Reddy is a 67 y/o female with PMH of ESLD secondary Hep B and D.  Listed for liver transplant with MELD 23.  Paracentesis 10/1 with 5L removed, no fluid sent for analysis.  Morning labs significant for hyponatremia, Na 119.  Pt admitted to LTS for sodium monitoring.    67 y/o F h/o HBV/delta cirrhosis s/p DBDLT 10/5/18 (CMV D+/R+, steroid induction, MMF/tacro/pred maintenance) with take back on 10/6 2/2 hyperbilirubinema and bilious VALORIE drainage, no leak identified. Post-op course c/b hypercapneic and hypoxic respiratory failure and was reintubated though quickly extubated now doing well. Liver bx (10/6) sig for Zone 3 hemorrhage and collapse, in addition to cholestasis. Transferred from ICU on POD #4. ID consulted to comment on positive diphtheroid culture from ascitic fluid (likely skin colonization) as well as ESBL Klebsiella pneumoniae in urine culture (10/4).  Pt asymptomatic and cell count from ascitic fluid unremarkable. Per ID recs, no need to treat diphtheroids or asymptomatic ESBL Kleb pneumo bacteriuria though pt has had 6 days of therapy which would cover these organisms. Mild peritransplant ascites- repeat US 10/6 with increased arterial resistive indices. Repeat US 10/9 w/ continued elevation in RIs and  fluid collections. LFTs trending down nicely.           Hospital Course:  Hospital Course:   Acute gradual worsening of renal function s/p OLTx, Nephrology consulted for post-op EVA. Creatinine on arrival 0.8 and trended up but pt remained with good UOP. Had stable response to Diuresis. Pt also with persistently elevated BUN, required multiple rounds of dialysis (10/15, 10/17, 10/20, 10/31), although not much improvement noted. Kidney function has since improved, with Cr now returned to baseline.     In addition, pt H/H cont to fluctuate requiring multiple blood transfusions (10/14, 10/16, 10/18, 10/19, 10/22, 10/27, 10/29).  Pt reported dark stools 10/15 but color has normalized. FOBT+. EGD 10/18 consistent with ulcerated mucosa in duodenum s/p coagulation. She was started on Protonix 40mg and completed course of amoxicillin/levofloxacin on 11/2 for concern of H. Pylori. Biopsy negative for infection and CMV. Transfuse as needed with goal Hb > 7.0.  Urine cx + Klebsiella pneumoniae (10/13). Received 3 day course of ciprofloxacin, dc'd 10/19. Patient finished 7 day course of Ertapenem for kleb pneumo ESBL UTI 11/29. Of note, pt grew same bacteria from wound 11/16. Purulent drainage noted from middle of chevron incision (11/16). Wound cx grew klebsiella p. CT scan showed no signs of infection. Area opened with wound vac placed 11/24, removed 12/7.  On 10/19 pm pt c/o crushing chest pain. Troponin 0.094, likely 2/2 ischemic stress. EKG NSR.     Liver US 11/6 showed no arterial flow. Repeat Liver US 11/8 showed arterial flow with increased RIs. 11/15 liver u/s showed abnormally decreased main hepatic artery peak systolic velocity with slow arterial flow and abnormal tardus parvus waveforms intrahepatically. Consulted vascular surgery. Repeat liver US 11/19 showed no arterial flow on R and sluggish flow on L. Hepatic angiogram 11/19 confirmed HAT. Liver US to assess recanalization post HAT 12/4 -> conclusion that pt may  recanalize. Will monitor closely with no further intervention planned at this time.     Pt with orthopnea. CT 11/26 showed large right and moderate left pleural effusions with adjacent basilar atelectasis. Pt sp02 stable on RA, but c/o some orthopnea. Chest xray and echo 12/9 with pleural effusions. Right thoracentesis with chest tube placement 12/11, fluid negative for infection. Chest tube removed, 12/13 w/o any issue. CXR obtained- no pneumo noted. Bilateral pleural effusion still present. Pt with continued worsening abdominal distention & LE edema. Multiple paracentesis (11/7, 11/12, 11/21- negative for infection) for ongoing ascites. 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 Milky appearance, cell count of fluid (51% lymphs), triglycerides 2208, consistent with chyle leak. Attempted low fat, high protein diet with no improvement seen in fluid character. 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 Peritoneal fluid 11/29 w/ Enterococcus. Per ID recs, treated with 10 day course of Vancomycin. Perc drain removed 12/17.     Of note, Valcyte held 10/31 for leukopenia. CMV PCR noted to be positive at 129 on 11/1. Held off on treatment with valcyte, however weekly CMV PCRs increased (12/6 with 529 copies), started treatment dose 12/9/18. Last CMV PCR 12/13 < 35 copies. Cont to monitor w/ weekly CMV PCR.     Interval history: No acute events overnight. LFTs remain stable. Pt with ongoing volume overload. Cont to diurese w/ lasix 40mg PO BID.  Paracentesis scheduled for tomorrow. Peritoneal fluid to be sent for lab analysis. H/H slowly trending down. Transfused 1u pRBC today. PO intake improving. Will not re-order TPN once complete. If pt cont to improve, possible discharge Friday.  Cont to monitor.     Scheduled Meds:   aspirin  81 mg Oral Daily    atovaquone  1,500 mg Oral Daily    entecavir  0.5 mg Oral Daily    furosemide  40 mg Oral BID loop    heparin (porcine)  5,000 Units Subcutaneous  Q12H    insulin aspart U-100  4-6 Units Subcutaneous TIDWM    magnesium oxide  800 mg Oral BID    metoprolol tartrate  25 mg Oral BID    pantoprazole  40 mg Oral BID    sodium chloride 0.9%  10 mL Intravenous Q6H    tacrolimus  2 mg Oral BID    valGANciclovir  450 mg Oral BID     Continuous Infusions:   insulin (HUMAN R) infusion (adults) 1.2 Units/hr (12/19/18 1045)    TPN ADULT CENTRAL LINE CUSTOM 21 mL/hr at 12/19/18 1045     PRN Meds:sodium chloride, sodium chloride, acetaminophen, bisacodyl, calcium carbonate, dextrose 50%, dextrose 50%, glucagon (human recombinant), glucose, glucose, hydrOXYzine HCl, insulin aspart U-100, ondansetron, ondansetron, polyethylene glycol, Flushing PICC Protocol **AND** sodium chloride 0.9% **AND** sodium chloride 0.9%, sodium chloride 0.9%, traMADol    Review of Systems   Constitutional: Positive for activity change, appetite change (improving) and fatigue. Negative for chills and fever.   HENT: Negative for congestion and facial swelling.    Eyes: Negative for pain and discharge.   Respiratory: Negative for cough, chest tightness, shortness of breath and wheezing.    Cardiovascular: Positive for leg swelling. Negative for chest pain and palpitations.   Gastrointestinal: Positive for abdominal distention and abdominal pain. Negative for diarrhea, nausea and vomiting.   Endocrine: Negative.    Genitourinary: Negative for decreased urine volume and difficulty urinating.   Musculoskeletal: Negative for back pain.   Skin: Positive for wound.   Allergic/Immunologic: Positive for immunocompromised state.   Neurological: Positive for weakness.   Hematological: Negative.    Psychiatric/Behavioral: Positive for dysphoric mood and sleep disturbance.     Objective:     Vital Signs (Most Recent):  Temp: 98.4 °F (36.9 °C) (12/19/18 1109)  Pulse: 109 (12/19/18 0815)  Resp: 19 (12/19/18 0815)  BP: 112/70 (12/19/18 0815)  SpO2: 99 % (12/19/18 0815) Vital Signs (24h Range):  Temp:  [98.2  °F (36.8 °C)-99.1 °F (37.3 °C)] 98.4 °F (36.9 °C)  Pulse:  [] 109  Resp:  [14-24] 19  SpO2:  [96 %-99 %] 99 %  BP: (106-143)/(65-88) 112/70     Weight: 57.6 kg (126 lb 15.8 oz)  Body mass index is 22.49 kg/m².    Intake/Output - Last 3 Shifts       12/17 0700 - 12/18 0659 12/18 0700 - 12/19 0659 12/19 0700 - 12/20 0659    P.O. 420 650     I.V. (mL/kg) 31 (0.6)      .2      Total Intake(mL/kg) 1365.2 (24.2) 650 (11.3)     Urine (mL/kg/hr)  1000 (0.7)     Drains 1710      Stool  0     Total Output 1710 1000     Net -344.8 -350            Urine Occurrence 2 x 2 x     Stool Occurrence 0 x 1 x     Emesis Occurrence 0 x            Physical Exam   Constitutional: She is oriented to person, place, and time. She appears well-developed.   Hand and temporal muscle wasting   HENT:   Head: Normocephalic.   Eyes: EOM are normal. Pupils are equal, round, and reactive to light. No scleral icterus.   Neck: Normal range of motion. Neck supple. No JVD present.   Cardiovascular: Regular rhythm, normal heart sounds and intact distal pulses. Tachycardia present.   Pulmonary/Chest: Effort normal and breath sounds normal. No respiratory distress.   Abdominal: Soft. Bowel sounds are normal. She exhibits distension and ascites. There is no tenderness.       Chevron incision healing  Prior perc drain site w/ RLQ stitch x 1      Musculoskeletal: Normal range of motion. She exhibits edema.   Neurological: She is alert and oriented to person, place, and time.   Skin: Skin is warm and dry.   Psychiatric: She has a normal mood and affect. Her behavior is normal.   Nursing note and vitals reviewed.      Laboratory:  Immunosuppressants         Stop Route Frequency     tacrolimus capsule 2 mg      -- Oral 2 times daily        CBC:   Recent Labs   Lab 12/19/18  0530   WBC 3.15*   RBC 2.35*   HGB 7.2*   HCT 22.7*   PLT 99*   MCV 97   MCH 30.6   MCHC 31.7*     CMP:   Recent Labs   Lab 12/19/18  0530   *   CALCIUM 8.1*   ALBUMIN 2.5*  "  PROT 4.8*      K 3.6   CO2 27   CL 99   BUN 47*   CREATININE 1.0   ALKPHOS 195*   ALT 19   AST 21   BILITOT 0.9     Labs within the past 24 hours have been reviewed.    Diagnostic Results:  I have personally reviewed all pertinent imaging studies.    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   - POD 1 US: increased arterial resistive indices, suggestive of edema vs acute rejection vs congestion.  - Repeat US 10/9 with continued elevation of RIs.  - LFTs stable.  - T bili trending down.   - Repeat liver US (10/11): elevated RIs.  - Liver US 11/6 to assess for ascites. No arterial flow within the liver allograft concerning for interval thrombosis/occlusion of the arterial system. Severe ascites seen.  - Liver US 11/8: arterial flow seen w/ elevated RIs   - LFTs remain stable. No CTA at this time due to EVA and normal liver function.   - Liver US 11/15 showed "abnormally decreased main hepatic artery peak systolic velocity with slow arterial flow and abnormal tardus parvus waveforms intrahepatically."   -Vascular surgery consulted  - Liver US 11/19 showed no arterial flow on the right and sluggish flow on the left.   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 No further intervention planned at this time as patient's LFTs are stable. Will monitor.  - CT abd/pelvis 11/26 showed: 1. Persistent large volume of ascites. 2. Large right and moderate left pleural effusions with adjacent basilar atelectasis. 3. Anasarca  - IR placed perc drain 11/29 due to suspicion for chyle leak. 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 Octreotide dc'd 12/18  - Fluid growing enterococcus - treatment with Vanc x 10 days per ID recs, completed.   - perc drain removed 12/17  - Paracentesis " scheduled for tomorrow.     Sleeps in sitting position due to orthopnea    - obtain chest xray and echo.  - thoracentesis with chest tube placement 12/11/18, removed 12/13. No pneumo noted on CXR.       Hypoalbuminemia    - Continue to monitor.     Sequelae of hyperalimentation    - Monitor.       Pleural effusion    - CT 11/26 showed: Large right and moderate left pleural effusions with adjacent basilar atelectasis.  - Pt sp02 stable on RA, but c/o some orthopnea.  - CT abd/pelvis 12/3 showed unchanged b/l pleural effusions (large on R, moderate on L) with bibasilar compressive atelectasis and consolidative changes.  - Sp02 stable & no signs of respiratory infection.  - repeat echo and chest xray with pleural effusion.  - thoracentesis with chest tube placement 12/11/18 - fluid negative for infection.  - CXR 12/12 w/ improvements noted.  - chest tube removed 12/13- CXR w/ no pneumo. Bilateral pleural effusion still present.  - Monitor     Hypomagnesemia    - Continue PO Mag Oxide.  - Continue to monitor.       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     Hypophosphatemia    - monitor.       Delayed surgical wound healing    - Middle chevron incision opened 11/16 with purulent drainage.  - Packed with Aquacel ag at this time. Growing Klebsiella p.   - CT abd/pelvis 11/17- no signs of infection.  - 11/17: R side of chevron incision w/ 3 small holes draining purulent fluid, opened and packed w/ aquacel.   - Wound care following.  - Wound w/ increased drainage, so wound care placed vac 11/24/18.  Apprec recs. Extending Ertapenem thru 11/28 per ID.    - Abdomen erythematous and indurated RLQ inferior to chevron.  - CT abd/pelvis 11/26 - no sign of infection.  - Last wound vac change 12/7 with improvements noted in wound - depth now 0.5cm.   - Wound vac dc'd     Hepatic artery thrombosis of transplanted liver    - Liver US 11/19 showed no arterial flow to R and sluggish flow to  "L.  - Hepatic angiogram 11/19 confirmed HAT.  - Liver US 12/4 to evaluate for recanalization reviewed. Recanalization possible. Will continue to monitor.  - No further intervention planned at this time. Will continue to monitor.     Other drug-induced pancytopenia    - Decreased plt, wbc, h/h.  - See "leukopenia"  - Continue to monitor.     Thrombocytopenia, unspecified    - Monitor.       Leukopenia    - Stopped Cellcept, Bactrim, and Valcyte 10/31.  - Cellcept half dose resumed 11/6  - Started Atovaquone 11/6.   - CMV PCR noted to be positive at 129 (11/1). Held off on treatment with valcyte, however weekly CMV PCRs increasing, start treatment dose 12/9/18.  - Cellcept held 11/23 2/2 infection (UTI + wound).         At risk for opportunistic infections    - Valcyte for CMV prophylaxis--> held 10/31 for leukopenia. Pt will need weekly CMV PCR while discontinued.  - CMV PCR noted to be positive at 129.  - CMV PCR 11/8: 105  - CMV PCR 11/15 with 89 copies  - CMV PCR 11/23 95 - increased from previous. Continue to hold Valcyte & monitor pt closely.  - CMV PCR 11/29 146.  - CMV PCR 12/6 529 - start treatment dose valcyte 12/9/18  - CMV PCR 12/13 < 35.  - Bactrim for PCP prophylaxis (MWF).--> holding (10/31) for leukopenia.   - Started Atovaquone 11/6.  - Fluconazole for fungal prophylaxis (first dose 10/6). D/c Fluc 11/16 bc pt had been on it for > 30 days post take back.       Long-term use of immunosuppressant medication    - Prograf: stopped 10/29. Resumed 11/2.  --> Will monitor for signs of toxic side effects, check daily troughs, and change meds accordingly.   - Prednisone: stopped 10/29. Started on Hydrocortisone. pred taper resumed.  - Cellcept: stopped 10/31 for leukopenia. Restarted Cellcept 500 mg bid, 11/6.  - Cellcept held 11/23 2/2 infection.         Prophylactic immunotherapy    - See long term use immunosuppresion.       Chylous ascites    - Paracentesis 10/1, 5L removed, no fluid sent for analysis.  - " Pt remains volume overloaded.   - Liver US 11/6 with severe ascites.   - Paracentesis 11/7 with 8.9 L removed. Wbc 39, segs 16%. Gram stain - no organisms seen and no WBC. Cultures NGTD.  - Paracentesis 11/12: 8.6 L removed. 34 wbc, 25% segs, 33 lymphs, 42 monocytes/macrophages. Cultures NGTD  - Paracentesis 11/21 with 4 L removed, neg for infection.  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 Milky appearance, cell count of fluid (51% lymphs), triglycerides 2208, consistent with chyle leak.   - Peritoneal fluid 11/29 growing enterococcus - treatment with Vanc x 10 days (completed 12/9) per ID recs.   - Attempted low fat, high protein diet due to suspicion for chyle leak with no improvement seen in fluid character.  - CT abd/pelvis 12/3 shows small volume abdominal ascites, improved compared to prior, pelvis ascites unchanged from prior, unchanged b/l pleural effusions (large on R, moderate on L) with bibasilar compressive atelectasis and consolidative changes.  - Placed PICC line and started TPN + octreotide 12/3. Improvement in color of fluid seen 12/6 - 12/7 (less milky, more abbie).   - Perc drain removed 12/17  - TPN to be dc'd today (12/19)  - Lasix 40 mg PO BID today.   - Paracentesis scheduled for tomorrow        Physical deconditioning    - PT/OT consulted.  Progressing well w/ therapy.  - Recommend home health PT and rolling walker at discharge (orders placed 11/6).       Anemia requiring transfusions    - Fluctuating H&H.    - Transfusions: 10/14; 10/16; 10/18; 10/19; 10/22; 10/27; 10/29, 10/31, 11/3, 11/21, 11/29, 12/4, 12/13.  - FOBT +  - LDH elevated, Hapto < 10.  - EGD 10/18: ulcerated duodenal mucosa.   - Consulted GI about this ongoing issue and further need of another EGD.  - EGD 11/5 unremarkable.   - Iron studies 11/13:  95, TIBC 107, sat iron 89, transferrin 72.  - Hemolytic anemia workup 11/13: Haptoglobin 132, retic 3.9 .  - 12/13 - transfused 1u PRBC, minimal  improvement in H/H.  - 12/20- 1u pRBC transfused  - No overt signs of bleeding.  - Continue to monitor.     Severe malnutrition    - Dietary consulted.   - Temporal and distal extremity muscle wasting and edema.  - Encourage supplements and to increase nutritional intake.  - Per nephrology, pt to be on low protein diet.  - Prealbumin reviewed.   - Tolerating diet.  Denies n/v.    - Attempted low fat, high protein diet due to suspicion for chyle leak with no improvement seen in fluid character.  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 Had advanced diet with return of milky drainage,   - TPN to be stopped today- PO intake improving.  Monitor      Type 2 diabetes mellitus with diabetic polyneuropathy, with long-term current use of insulin    - Continue home regimen.  - TPN started 12/3 and currently on insulin drip.  - Endocrine following. Appreciate recs.       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  - Tx w/ Entecavir- dose changed to daily 11/26 due to improved renal fx.  - 12/3 Hep B surface antigen negative 12/3, surface AB quant 571, and Hep B DNA <10.  - Continue to monitor, per protocol.         VTE Risk Mitigation (From admission, onward)        Ordered     heparin (porcine) injection 5,000 Units  Every 12 hours      12/06/18 1058     IP VTE HIGH RISK PATIENT  Once      11/05/18 1145     Place sequential compression device  Until discontinued      10/05/18 0709          The patients clinical status was discussed at multidisplinary rounds, involving transplant surgery, transplant medicine, pharmacy, nursing, nutrition, and social work    Discharge Planning:  Tentative discharge Friday      Jihan Soares PA-C  Liver Transplant  [facility]

## 2018-12-19 NOTE — ASSESSMENT & PLAN NOTE
- Paracentesis 10/1, 5L removed, no fluid sent for analysis.  - Pt remains volume overloaded.   - Liver US 11/6 with severe ascites.   - Paracentesis 11/7 with 8.9 L removed. Wbc 39, segs 16%. Gram stain - no organisms seen and no WBC. Cultures NGTD.  - Paracentesis 11/12: 8.6 L removed. 34 wbc, 25% segs, 33 lymphs, 42 monocytes/macrophages. Cultures NGTD  - Paracentesis 11/21 with 4 L removed, neg for infection.  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 Milky appearance, cell count of fluid (51% lymphs), triglycerides 2208, consistent with chyle leak.   - Peritoneal fluid 11/29 growing enterococcus - treatment with Vanc x 10 days (completed 12/9) per ID recs.   - Attempted low fat, high protein diet due to suspicion for chyle leak with no improvement seen in fluid character.  - CT abd/pelvis 12/3 shows small volume abdominal ascites, improved compared to prior, pelvis ascites unchanged from prior, unchanged b/l pleural effusions (large on R, moderate on L) with bibasilar compressive atelectasis and consolidative changes.  - Placed PICC line and started TPN + octreotide 12/3. Improvement in color of fluid seen 12/6 - 12/7 (less milky, more abbie).   - Perc drain removed 12/17  - TPN to be dc'd today (12/19)  - Lasix 40 mg PO BID today.   - Paracentesis scheduled for tomorrow

## 2018-12-19 NOTE — SUBJECTIVE & OBJECTIVE
"Interval HPI:   Overnight events:  Remains in TSU. NAEON. BG above goal overnight on insulin infusion at 1 u/hr; dinner insulin not given. Requiring correction scale coverage.   Eatin%  Nausea: No  Hypoglycemia and intervention: No  Fever: No  TPN and/or TF: No      /70   Pulse 109   Temp 98.6 °F (37 °C) (Oral)   Resp 19   Ht 5' 3" (1.6 m)   Wt 57.6 kg (126 lb 15.8 oz)   LMP  (LMP Unknown)   SpO2 99%   Breastfeeding? No   BMI 22.49 kg/m²     Labs Reviewed and Include    Recent Labs   Lab 18  0530   *   CALCIUM 8.1*   ALBUMIN 2.5*   PROT 4.8*      K 3.6   CO2 27   CL 99   BUN 47*   CREATININE 1.0   ALKPHOS 195*   ALT 19   AST 21   BILITOT 0.9     Lab Results   Component Value Date    WBC 3.15 (L) 2018    HGB 7.2 (L) 2018    HCT 22.7 (L) 2018    MCV 97 2018    PLT 99 (L) 2018     No results for input(s): TSH, FREET4 in the last 168 hours.  Lab Results   Component Value Date    HGBA1C 6.0 (H) 10/04/2018       Nutritional status:   Body mass index is 22.49 kg/m².  Lab Results   Component Value Date    ALBUMIN 2.5 (L) 2018    ALBUMIN 2.7 (L) 2018    ALBUMIN 2.3 (L) 2018     Lab Results   Component Value Date    PREALBUMIN 13 (L) 12/10/2018    PREALBUMIN 15 (L) 2018    PREALBUMIN 12 (L) 2018       Estimated Creatinine Clearance: 43.9 mL/min (based on SCr of 1 mg/dL).    Accu-Checks  Recent Labs     18  0817 18  1214 18  1755 18  2232 18  0207 18  0904 18  1208 18  1803 18  2150 18  0157   POCTGLUCOSE 212* 199* 223* 238* 204* 211* 277* 109 347* 280*       Current Medications and/or Treatments Impacting Glycemic Control  Immunotherapy:    Immunosuppressants         Stop Route Frequency     tacrolimus capsule 2 mg      -- Oral 2 times daily        Steroids:   Hormones (From admission, onward)    Start     Stop Route Frequency Ordered    10/17/18 0945  predniSONE " tablet 15 mg      10/24 0859 Oral Daily 10/17/18 0943        Pressors:    Autonomic Drugs (From admission, onward)    Start     Stop Route Frequency Ordered    10/06/18 1617  rocuronium (ZEMURON) 10 mg/mL injection     Comments:  Created by cabinet overrholley    10/07 0429   10/06/18 1617        Hyperglycemia/Diabetes Medications:   Antihyperglycemics (From admission, onward)    Start     Stop Route Frequency Ordered    12/18/18 1130  insulin aspart U-100 pen 4-6 Units      -- SubQ 3 times daily with meals 12/18/18 1110    12/14/18 1700  insulin regular (Humulin R) 100 Units in sodium chloride 0.9% 100 mL infusion      -- IV Continuous 12/14/18 1555    12/14/18 1655  insulin aspart U-100 pen 0-4 Units      -- SubQ As needed (PRN) 12/14/18 1555    10/07/18 1200  insulin regular (Humulin R) 100 Units in sodium chloride 0.9% 100 mL infusion      10/13 2100 IV Continuous 10/07/18 1055

## 2018-12-19 NOTE — ASSESSMENT & PLAN NOTE
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   - POD 1 US: increased arterial resistive indices, suggestive of edema vs acute rejection vs congestion.  - Repeat US 10/9 with continued elevation of RIs.  - LFTs stable.  - T bili trending down.   - Repeat liver US (10/11): elevated RIs.  - Liver US 11/6 to assess for ascites. No arterial flow within the liver allograft concerning for interval thrombosis/occlusion of the arterial system. Severe ascites seen.  - Liver US 11/8: arterial flow seen w/ elevated RIs   - LFTs remain stable. No CTA at this time due to EVA and normal liver function.   - Liver US 11/15 showed "abnormally decreased main hepatic artery peak systolic velocity with slow arterial flow and abnormal tardus parvus waveforms intrahepatically."   -Vascular surgery consulted  - Liver US 11/19 showed no arterial flow on the right and sluggish flow on the left.   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 No further intervention planned at this time as patient's LFTs are stable. Will monitor.  - CT abd/pelvis 11/26 showed: 1. Persistent large volume of ascites. 2. Large right and moderate left pleural effusions with adjacent basilar atelectasis. 3. Anasarca  - IR placed perc drain 11/29 due to suspicion for chyle leak. 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 Octreotide dc'd 12/18  - Fluid growing enterococcus - treatment with Vanc x 10 days per ID recs, completed.   - perc drain removed 12/17  - Paracentesis scheduled for tomorrow.  "

## 2018-12-19 NOTE — ASSESSMENT & PLAN NOTE
- Dietary consulted.   - Temporal and distal extremity muscle wasting and edema.  - Encourage supplements and to increase nutritional intake.  - Per nephrology, pt to be on low protein diet.  - Prealbumin reviewed.   - Tolerating diet.  Denies n/v.    - Attempted low fat, high protein diet due to suspicion for chyle leak with no improvement seen in fluid character.  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 Had advanced diet with return of milky drainage,   - TPN to be stopped today- PO intake improving.  Monitor

## 2018-12-19 NOTE — ASSESSMENT & PLAN NOTE
- Middle chevron incision opened 11/16 with purulent drainage.  - Packed with Aquacel ag at this time. Growing Klebsiella p.   - CT abd/pelvis 11/17- no signs of infection.  - 11/17: R side of chevron incision w/ 3 small holes draining purulent fluid, opened and packed w/ aquacel.   - Wound care following.  - Wound w/ increased drainage, so wound care placed vac 11/24/18.  Apprec recs. Extending Ertapenem thru 11/28 per ID.    - Abdomen erythematous and indurated RLQ inferior to chevron.  - CT abd/pelvis 11/26 - no sign of infection.  - Last wound vac change 12/7 with improvements noted in wound - depth now 0.5cm.   - Wound vac dc'd

## 2018-12-19 NOTE — PT/OT/SLP PROGRESS
Physical Therapy Treatment    Patient Name:  Scarlett Reddy   MRN:  [id number]    Recommendations:     Discharge Recommendations:  ( PT/OT)   Discharge Equipment Recommendations: commode, walker, rolling   Barriers to discharge: None    Assessment:     Scarlett Reddy is a 68 y.o. female admitted with a medical diagnosis of Liver transplanted.  She presents with the following impairments/functional limitations:  weakness, gait instability, impaired endurance, impaired balance, impaired self care skills, impaired functional mobilty, edema. Notable improvement in functional mobility this date, as pt was able to increase gait distance and performed sit>stand transfer without assist. Continues to demo generalized weakness and impaired endurance with fatigue reported following gait. Pt would continue to benefit from skilled acute PT in order to address current deficits and progress functional mobility. PT POC decreased to 2x/week 2* improvement in mobility.     Rehab Prognosis:  good; patient would benefit from acute skilled PT services to address these deficits and reach maximum level of function.      Recent Surgery: Procedure(s) (LRB):  ANGIOGRAM-HEPATIC (Left) 30 Days Post-Op    Plan:     During this hospitalization, patient to be seen 2 x/week to address the above listed problems via gait training, therapeutic activities, therapeutic exercises, neuromuscular re-education  · Plan of Care Expires:  01/17/19   Plan of Care Reviewed with: patient    Subjective     Communicated with RN prior to session.  Patient found UIC upon PT entry to room, agreeable to treatment.      Chief Complaint: fatigue following gait   Pain/Comfort:  · Pain Rating 1: 0/10    Patients cultural, spiritual, [employer] conflicts given the current situation: none noted    Objective:     Patient found with: telemetry, pulse ox (continuous), PICC line     General Precautions: Standard, fall, contact   Orthopedic Precautions:N/A   Braces: N/A      Functional Mobility:  · Transfers:     · Sit to Stand:  stand by assistance with rolling walker  · Gait: ~280 ft. with SBA and RW  ? Demo'd decreased step length, decreased giuseppe, impaired weight-shifting ability, decreased toe-floor clearance; however, gait mechanics improved compared to previous sessions   ? Cues provided for upright posture and forward gaze   ? Fatigue noted following       AM-PAC 6 CLICK MOBILITY  Turning over in bed (including adjusting bedclothes, sheets and blankets)?: 3  Sitting down on and standing up from a chair with arms (e.g., wheelchair, bedside commode, etc.): 3  Moving from lying on back to sitting on the side of the bed?: 3  Moving to and from a bed to a chair (including a wheelchair)?: 3  Need to walk in hospital room?: 3  Climbing 3-5 steps with a railing?: 2  Basic Mobility Total Score: 17       Patient left up in chair with all lines intact, call button in reach and pt's  present..    GOALS:   Multidisciplinary Problems     Physical Therapy Goals        Problem: Physical Therapy Goal    Goal Priority Disciplines Outcome Goal Variances Interventions   Physical Therapy Goal     PT, PT/OT Ongoing (interventions implemented as appropriate)     Description:  Goals to be met by: 2018    Patient will increase functional independence with mobility by performin. Supine to sit with Contact Guard Assistance - not met  2. Sit to stand transfer with Supervision using LRAD or no AD - not met  3. Bed to chair transfer with Supervision using LRAD or no AD - not met  4. Gait  x 250 feet with Supervision using LRAD or no AD - not met  5. Stand for 3 minutes with Supervision to increase activity tolerance - not met   6. Lower extremity exercise program x15 reps per handout, with independence - not met                              Time Tracking:     PT Received On: 18  PT Start Time: 934     PT Stop Time: 945  PT Total Time (min): 11 min     Billable Minutes:  Therapeutic Activity 11    Treatment Type: Treatment  PT/PTA: PT     PTA Visit Number: 0     Radha Nguyen PT, DPT   12/19/2018  [phone]

## 2018-12-19 NOTE — PROGRESS NOTES
"[facility]  Endocrinology  Progress Note    Admit Date: 10/1/2018     Reason for Consult: Management of type 2 DM, Hyperglycemia      Surgical Procedure and Date: Liver transplant 10/5/18, Ex lap 10/6/18     Diabetes diagnosis year:      Home Diabetes Medications:  Lantus 18 units HS and novolog 17 units with meals plus correction scale (150-200 +1)        Lab Results   Component Value Date     HGBA1C 6.8 (H) 2018        How often checking glucose at home? 3-4   BG readings on regimen: 120-150.  Post prandial readings as high as upper 200s  Hypoglycemia on the regimen? yes, none recently   Missed doses on regimen?  No     Diabetes Complications include:     Diabetic peripheral neuropathy      Complicating diabetes co morbidities:   CIRRHOSIS        HPI:  Patient is a 68 y.o. female with a diagnosis of ESLD, listed for liver transplant with meld 23 who underwent live transplant on 10/5/18.  Back to OR on 10/6/18 for ex lap.  Admitted 10/1/18 for hyponatremia s/p paracentesis.  Personal has known diagnosis of diabetes, endocrine consulted for diabetes management.    Post-operative course complicated by ESBL Klebsiella pneumoniae in urine (in contact isolation), peritransplant ascites, worsening renal function (required temporary dialysis), anemia (multiple blood transfusions), and possible GI bleed.     Interval HPI:   Overnight events:  Remains in TSU. NAEON. BG above goal overnight on insulin infusion at 1 u/hr; dinner insulin not given. Requiring correction scale coverage.   Eatin%  Nausea: No  Hypoglycemia and intervention: No  Fever: No  TPN and/or TF: No      /70   Pulse 109   Temp 98.6 °F (37 °C) (Oral)   Resp 19   Ht 5' 3" (1.6 m)   Wt 57.6 kg (126 lb 15.8 oz)   LMP  (LMP Unknown)   SpO2 99%   Breastfeeding? No   BMI 22.49 kg/m²      Labs Reviewed and Include    Recent Labs   Lab 18  0530   *   CALCIUM 8.1*   ALBUMIN 2.5*   PROT 4.8*    "   K 3.6   CO2 27   CL 99   BUN 47*   CREATININE 1.0   ALKPHOS 195*   ALT 19   AST 21   BILITOT 0.9     Lab Results   Component Value Date    WBC 3.15 (L) 12/19/2018    HGB 7.2 (L) 12/19/2018    HCT 22.7 (L) 12/19/2018    MCV 97 12/19/2018    PLT 99 (L) 12/19/2018     No results for input(s): TSH, FREET4 in the last 168 hours.  Lab Results   Component Value Date    HGBA1C 6.0 (H) 10/04/2018       Nutritional status:   Body mass index is 22.49 kg/m².  Lab Results   Component Value Date    ALBUMIN 2.5 (L) 12/19/2018    ALBUMIN 2.7 (L) 12/18/2018    ALBUMIN 2.3 (L) 12/17/2018     Lab Results   Component Value Date    PREALBUMIN 13 (L) 12/10/2018    PREALBUMIN 15 (L) 11/03/2018    PREALBUMIN 12 (L) 09/17/2018       Estimated Creatinine Clearance: 43.9 mL/min (based on SCr of 1 mg/dL).    Accu-Checks  Recent Labs     12/17/18  0817 12/17/18  1214 12/17/18  1755 12/17/18  2232 12/18/18  0207 12/18/18  0904 12/18/18  1208 12/18/18  1803 12/18/18  2150 12/19/18  0157   POCTGLUCOSE 212* 199* 223* 238* 204* 211* 277* 109 347* 280*       Current Medications and/or Treatments Impacting Glycemic Control  Immunotherapy:    Immunosuppressants         Stop Route Frequency     tacrolimus capsule 2 mg      -- Oral 2 times daily        Steroids:   Hormones (From admission, onward)    Start     Stop Route Frequency Ordered    10/17/18 0945  predniSONE tablet 15 mg      10/24 0859 Oral Daily 10/17/18 0943        Pressors:    Autonomic Drugs (From admission, onward)    Start     Stop Route Frequency Ordered    10/06/18 1617  rocuronium (ZEMURON) 10 mg/mL injection     Comments:  Created by cabinet override    10/07 0425   10/06/18 1617        Hyperglycemia/Diabetes Medications:   Antihyperglycemics (From admission, onward)    Start     Stop Route Frequency Ordered    12/18/18 1130  insulin aspart U-100 pen 4-6 Units      -- SubQ 3 times daily with meals 12/18/18 1110    12/14/18 1700  insulin regular (Humulin R) 100 Units in sodium  chloride 0.9% 100 mL infusion      -- IV Continuous 12/14/18 1555    12/14/18 1655  insulin aspart U-100 pen 0-4 Units      -- SubQ As needed (PRN) 12/14/18 1555    10/07/18 1200  insulin regular (Humulin R) 100 Units in sodium chloride 0.9% 100 mL infusion      10/13 2100 IV Continuous 10/07/18 1055          ASSESSMENT and PLAN    * Liver transplanted    Managed per primary team  Avoid hypoglycemia    Recent Labs   Lab 12/18/18  0600   ALT 18   AST 20   ALKPHOS 182*   BILITOT 1.1*   PROT 4.9*   ALBUMIN 2.7*            Type 2 diabetes mellitus with diabetic polyneuropathy, with long-term current use of insulin    BG goal 140 - 180     Transition IV insulin infusion at 1.2 u/hr  Continue Novolog to 4-6 units with meals; give half the scheduled insulin if BG . Hold if less than 90.   Low dose correction  BG monitoring AC/HS/0200    ADDENDUM: TPN being discontinued tonight; stop IV insulin infusion when TPN discontinued.     Discharge planning: TBD         Sequelae of hyperalimentation    On TPN, elevating BG readings       Other ascites    Paracentesis prn per primary team.  Avoid hypoglycemia  TPN started- NPO     Prophylactic immunotherapy    May increase insulin resistance.          Severe malnutrition    On TPN. May elevate BG         Aviva Caldera, NP  Endocrinology  [facility]

## 2018-12-19 NOTE — PLAN OF CARE
Problem: Physical Therapy Goal  Goal: Physical Therapy Goal  Goals to be met by: 2018    Patient will increase functional independence with mobility by performin. Supine to sit with Contact Guard Assistance - not met  2. Sit to stand transfer with Supervision using LRAD or no AD - not met  3. Bed to chair transfer with Supervision using LRAD or no AD - not met  4. Gait  x 250 feet with Supervision using LRAD or no AD - not met  5. Stand for 3 minutes with Supervision to increase activity tolerance - not met   6. Lower extremity exercise program x15 reps per handout, with independence - not met            Outcome: Ongoing (interventions implemented as appropriate)  Goals reviewed and remain appropriate. Pt progressing towards goals.    Radha Nguyen, PT, DPT   2018  [phone]

## 2018-12-19 NOTE — PLAN OF CARE
Problem: Patient Care Overview  Goal: Plan of Care Review  Outcome: Ongoing (interventions implemented as appropriate)  Pt is AAOx4 in bed wearing non-skid footwear, bed in low/locked position and with call bell within reach. Pt reminded to use call bell to call for assistance, pt verbalizes understanding.  at bedside. Pt is afebrile at this time. Proper hand hygiene performed before and after pt care activities. Insulin gtt infusing at 1unit/hr. BG this shift has been 347 and 280. Denies any pain or discomfort at this time.

## 2018-12-20 PROBLEM — E68 SEQUELAE OF HYPERALIMENTATION: Status: RESOLVED | Noted: 2018-12-04 | Resolved: 2018-12-20

## 2018-12-20 PROBLEM — E43 SEVERE MALNUTRITION: Status: RESOLVED | Noted: 2018-08-16 | Resolved: 2018-12-20

## 2018-12-20 LAB
ALBUMIN SERPL BCP-MCNC: 2.6 G/DL
ALP SERPL-CCNC: 206 U/L
ALT SERPL W/O P-5'-P-CCNC: 22 U/L
ANION GAP SERPL CALC-SCNC: 10 MMOL/L
ANISOCYTOSIS BLD QL SMEAR: SLIGHT
AST SERPL-CCNC: 24 U/L
BASOPHILS # BLD AUTO: 0.02 K/UL
BASOPHILS NFR BLD: 0.7 %
BILIRUB SERPL-MCNC: 1.3 MG/DL
BUN SERPL-MCNC: 52 MG/DL
CALCIUM SERPL-MCNC: 8.2 MG/DL
CHLORIDE SERPL-SCNC: 101 MMOL/L
CO2 SERPL-SCNC: 27 MMOL/L
CREAT SERPL-MCNC: 1.1 MG/DL
DIFFERENTIAL METHOD: ABNORMAL
EOSINOPHIL # BLD AUTO: 0.1 K/UL
EOSINOPHIL NFR BLD: 1.8 %
ERYTHROCYTE [DISTWIDTH] IN BLOOD BY AUTOMATED COUNT: 17.9 %
EST. GFR  (AFRICAN AMERICAN): 59.2 ML/MIN/1.73 M^2
EST. GFR  (NON AFRICAN AMERICAN): 51.3 ML/MIN/1.73 M^2
GLUCOSE SERPL-MCNC: 199 MG/DL
HCT VFR BLD AUTO: 25.1 %
HGB BLD-MCNC: 8 G/DL
IMM GRANULOCYTES # BLD AUTO: 0.04 K/UL
IMM GRANULOCYTES NFR BLD AUTO: 1.4 %
LYMPHOCYTES # BLD AUTO: 0.5 K/UL
LYMPHOCYTES NFR BLD: 18 %
MAGNESIUM SERPL-MCNC: 1.7 MG/DL
MCH RBC QN AUTO: 30.1 PG
MCHC RBC AUTO-ENTMCNC: 31.9 G/DL
MCV RBC AUTO: 94 FL
MONOCYTES # BLD AUTO: 0.1 K/UL
MONOCYTES NFR BLD: 2.1 %
NEUTROPHILS # BLD AUTO: 2.2 K/UL
NEUTROPHILS NFR BLD: 76 %
NRBC BLD-RTO: 0 /100 WBC
OVALOCYTES BLD QL SMEAR: ABNORMAL
PHOSPHATE SERPL-MCNC: 3.4 MG/DL
PLATELET # BLD AUTO: 95 K/UL
PLATELET BLD QL SMEAR: ABNORMAL
PMV BLD AUTO: 10 FL
POCT GLUCOSE: 155 MG/DL (ref 70–110)
POCT GLUCOSE: 157 MG/DL (ref 70–110)
POCT GLUCOSE: 186 MG/DL (ref 70–110)
POCT GLUCOSE: 206 MG/DL (ref 70–110)
POCT GLUCOSE: 240 MG/DL (ref 70–110)
POCT GLUCOSE: 252 MG/DL (ref 70–110)
POIKILOCYTOSIS BLD QL SMEAR: SLIGHT
POLYCHROMASIA BLD QL SMEAR: ABNORMAL
POTASSIUM SERPL-SCNC: 3.7 MMOL/L
PROT SERPL-MCNC: 4.8 G/DL
RBC # BLD AUTO: 2.66 M/UL
SODIUM SERPL-SCNC: 138 MMOL/L
TACROLIMUS BLD-MCNC: 4 NG/ML
WBC # BLD AUTO: 2.84 K/UL

## 2018-12-20 PROCEDURE — 25000003 PHARM REV CODE 250: Performed by: TRANSPLANT SURGERY

## 2018-12-20 PROCEDURE — 20600001 HC STEP DOWN PRIVATE ROOM

## 2018-12-20 PROCEDURE — 99232 SBSQ HOSP IP/OBS MODERATE 35: CPT | Mod: ,,, | Performed by: NURSE PRACTITIONER

## 2018-12-20 PROCEDURE — 83735 ASSAY OF MAGNESIUM: CPT

## 2018-12-20 PROCEDURE — 85025 COMPLETE CBC W/AUTO DIFF WBC: CPT

## 2018-12-20 PROCEDURE — 63600175 PHARM REV CODE 636 W HCPCS: Performed by: NURSE PRACTITIONER

## 2018-12-20 PROCEDURE — 80197 ASSAY OF TACROLIMUS: CPT

## 2018-12-20 PROCEDURE — 94664 DEMO&/EVAL PT USE INHALER: CPT

## 2018-12-20 PROCEDURE — 25000003 PHARM REV CODE 250: Performed by: NURSE PRACTITIONER

## 2018-12-20 PROCEDURE — A4216 STERILE WATER/SALINE, 10 ML: HCPCS | Performed by: TRANSPLANT SURGERY

## 2018-12-20 PROCEDURE — 63600175 PHARM REV CODE 636 W HCPCS: Performed by: PHYSICIAN ASSISTANT

## 2018-12-20 PROCEDURE — 99233 SBSQ HOSP IP/OBS HIGH 50: CPT | Mod: 24,,, | Performed by: PHYSICIAN ASSISTANT

## 2018-12-20 PROCEDURE — 27000646 HC AEROBIKA DEVICE

## 2018-12-20 PROCEDURE — 97110 THERAPEUTIC EXERCISES: CPT

## 2018-12-20 PROCEDURE — 25000003 PHARM REV CODE 250: Performed by: PHYSICIAN ASSISTANT

## 2018-12-20 PROCEDURE — 84100 ASSAY OF PHOSPHORUS: CPT

## 2018-12-20 PROCEDURE — 80053 COMPREHEN METABOLIC PANEL: CPT

## 2018-12-20 PROCEDURE — 99900035 HC TECH TIME PER 15 MIN (STAT)

## 2018-12-20 PROCEDURE — 63600175 PHARM REV CODE 636 W HCPCS: Performed by: TRANSPLANT SURGERY

## 2018-12-20 RX ORDER — GLUCAGON 1 MG
1 KIT INJECTION
Status: DISCONTINUED | OUTPATIENT
Start: 2018-12-20 | End: 2018-12-21 | Stop reason: HOSPADM

## 2018-12-20 RX ORDER — ATOVAQUONE 750 MG/5ML
1500 SUSPENSION ORAL DAILY
Qty: 300 ML | Refills: 3 | Status: SHIPPED | OUTPATIENT
Start: 2018-12-20 | End: 2019-04-03

## 2018-12-20 RX ORDER — INSULIN ASPART 100 [IU]/ML
0-5 INJECTION, SOLUTION INTRAVENOUS; SUBCUTANEOUS
Status: DISCONTINUED | OUTPATIENT
Start: 2018-12-20 | End: 2018-12-21 | Stop reason: HOSPADM

## 2018-12-20 RX ORDER — NAPROXEN SODIUM 220 MG/1
81 TABLET, FILM COATED ORAL DAILY
Refills: 0 | COMMUNITY
Start: 2018-12-20 | End: 2018-12-21

## 2018-12-20 RX ORDER — INSULIN ASPART 100 [IU]/ML
4-6 INJECTION, SOLUTION INTRAVENOUS; SUBCUTANEOUS
Status: DISCONTINUED | OUTPATIENT
Start: 2018-12-20 | End: 2018-12-21 | Stop reason: HOSPADM

## 2018-12-20 RX ORDER — VALGANCICLOVIR 450 MG/1
450 TABLET, FILM COATED ORAL 2 TIMES DAILY
Qty: 60 TABLET | Refills: 2 | Status: SHIPPED | OUTPATIENT
Start: 2018-12-20 | End: 2018-12-24 | Stop reason: ALTCHOICE

## 2018-12-20 RX ORDER — IBUPROFEN 200 MG
24 TABLET ORAL
Status: DISCONTINUED | OUTPATIENT
Start: 2018-12-20 | End: 2018-12-21 | Stop reason: HOSPADM

## 2018-12-20 RX ORDER — LANOLIN ALCOHOL/MO/W.PET/CERES
800 CREAM (GRAM) TOPICAL 2 TIMES DAILY
Qty: 120 TABLET | Refills: 3 | Status: SHIPPED | OUTPATIENT
Start: 2018-12-20 | End: 2018-12-24 | Stop reason: ALTCHOICE

## 2018-12-20 RX ORDER — FUROSEMIDE 20 MG/1
20 TABLET ORAL DAILY
Status: DISCONTINUED | OUTPATIENT
Start: 2018-12-20 | End: 2018-12-20

## 2018-12-20 RX ORDER — HEPARIN SODIUM 5000 [USP'U]/ML
5000 INJECTION, SOLUTION INTRAVENOUS; SUBCUTANEOUS EVERY 12 HOURS
Status: DISCONTINUED | OUTPATIENT
Start: 2018-12-20 | End: 2018-12-21 | Stop reason: HOSPADM

## 2018-12-20 RX ORDER — PANTOPRAZOLE SODIUM 40 MG/1
40 TABLET, DELAYED RELEASE ORAL 2 TIMES DAILY
Qty: 60 TABLET | Refills: 5 | Status: SHIPPED | OUTPATIENT
Start: 2018-12-20

## 2018-12-20 RX ORDER — FUROSEMIDE 20 MG/1
20 TABLET ORAL DAILY
Status: DISCONTINUED | OUTPATIENT
Start: 2018-12-21 | End: 2018-12-21 | Stop reason: HOSPADM

## 2018-12-20 RX ORDER — IBUPROFEN 200 MG
16 TABLET ORAL
Status: DISCONTINUED | OUTPATIENT
Start: 2018-12-20 | End: 2018-12-21 | Stop reason: HOSPADM

## 2018-12-20 RX ORDER — METOPROLOL TARTRATE 25 MG/1
25 TABLET, FILM COATED ORAL 2 TIMES DAILY
Qty: 60 TABLET | Refills: 5 | Status: SHIPPED | OUTPATIENT
Start: 2018-12-20 | End: 2018-12-24 | Stop reason: ALTCHOICE

## 2018-12-20 RX ADMIN — INSULIN ASPART 2 UNITS: 100 INJECTION, SOLUTION INTRAVENOUS; SUBCUTANEOUS at 12:12

## 2018-12-20 RX ADMIN — MAGNESIUM OXIDE TAB 400 MG (241.3 MG ELEMENTAL MG) 800 MG: 400 (241.3 MG) TAB at 07:12

## 2018-12-20 RX ADMIN — HEPARIN SODIUM 5000 UNITS: 5000 INJECTION, SOLUTION INTRAVENOUS; SUBCUTANEOUS at 09:12

## 2018-12-20 RX ADMIN — PANTOPRAZOLE SODIUM 40 MG: 40 TABLET, DELAYED RELEASE ORAL at 07:12

## 2018-12-20 RX ADMIN — INSULIN ASPART 6 UNITS: 100 INJECTION, SOLUTION INTRAVENOUS; SUBCUTANEOUS at 06:12

## 2018-12-20 RX ADMIN — MAGNESIUM OXIDE TAB 400 MG (241.3 MG ELEMENTAL MG) 800 MG: 400 (241.3 MG) TAB at 09:12

## 2018-12-20 RX ADMIN — INSULIN ASPART 1 UNITS: 100 INJECTION, SOLUTION INTRAVENOUS; SUBCUTANEOUS at 07:12

## 2018-12-20 RX ADMIN — PANTOPRAZOLE SODIUM 40 MG: 40 TABLET, DELAYED RELEASE ORAL at 09:12

## 2018-12-20 RX ADMIN — ENTECAVIR 0.5 MG: 0.5 TABLET ORAL at 07:12

## 2018-12-20 RX ADMIN — TACROLIMUS 2 MG: 1 CAPSULE ORAL at 05:12

## 2018-12-20 RX ADMIN — VALGANCICLOVIR 450 MG: 450 TABLET, FILM COATED ORAL at 07:12

## 2018-12-20 RX ADMIN — FUROSEMIDE 40 MG: 40 TABLET ORAL at 07:12

## 2018-12-20 RX ADMIN — Medication 10 ML: at 05:12

## 2018-12-20 RX ADMIN — TACROLIMUS 2 MG: 1 CAPSULE ORAL at 07:12

## 2018-12-20 RX ADMIN — INSULIN ASPART 3 UNITS: 100 INJECTION, SOLUTION INTRAVENOUS; SUBCUTANEOUS at 06:12

## 2018-12-20 RX ADMIN — VALGANCICLOVIR 450 MG: 450 TABLET, FILM COATED ORAL at 09:12

## 2018-12-20 RX ADMIN — HEPATITIS B IMMUNE GLOBULIN (HUMAN) 9999.91 UNITS: 312 INJECTION, SOLUTION INTRAMUSCULAR; INTRAVENOUS at 02:12

## 2018-12-20 RX ADMIN — Medication 10 ML: at 12:12

## 2018-12-20 RX ADMIN — ATOVAQUONE 1500 MG: 750 SUSPENSION ORAL at 07:12

## 2018-12-20 RX ADMIN — INSULIN ASPART 6 UNITS: 100 INJECTION, SOLUTION INTRAVENOUS; SUBCUTANEOUS at 07:12

## 2018-12-20 RX ADMIN — ASPIRIN 81 MG CHEWABLE TABLET 81 MG: 81 TABLET CHEWABLE at 07:12

## 2018-12-20 RX ADMIN — METOPROLOL TARTRATE 25 MG: 25 TABLET ORAL at 09:12

## 2018-12-20 RX ADMIN — INSULIN ASPART 6 UNITS: 100 INJECTION, SOLUTION INTRAVENOUS; SUBCUTANEOUS at 12:12

## 2018-12-20 RX ADMIN — INSULIN ASPART 1 UNITS: 100 INJECTION, SOLUTION INTRAVENOUS; SUBCUTANEOUS at 02:12

## 2018-12-20 NOTE — ASSESSMENT & PLAN NOTE
Managed per primary team  Avoid hypoglycemia    Recent Labs   Lab 12/19/18  0530   ALT 19   AST 21   ALKPHOS 195*   BILITOT 0.9   PROT 4.8*   ALBUMIN 2.5*

## 2018-12-20 NOTE — PROGRESS NOTES
TIMBO informed by CLAU Johnson that will likely dc tomorrow with needs for HH for PT/OT/RN. TIMBO contacted [facility] with [facility] ext [phone] who is already aware of pending referral. TIMBO confirmed pt's needs. Melissa states will follow up. HH orders updated. SW remains available.

## 2018-12-20 NOTE — PROGRESS NOTES
Wound care follow up:  Pt sitting up in chair with [facility] [place] [place]  and family at bedside. Upon assessment, noted healing ulcerations to chevron incision. See photos below. Pt plans for discharge today or tomorrow. Recommend d/c hydrofera blue dressings and protect healing ulcers with foam dressing, likely one more week.  Discussed care with pt, family, and nursing.  [id number]

## 2018-12-20 NOTE — PLAN OF CARE
Problem: Patient Care Overview  Goal: Plan of Care Review  Outcome: Ongoing (interventions implemented as appropriate)  Pt AAOx4, VSS ex HR, in bed with upper siderails raised x2, bed in lowest/locked position, call light/personal belongings within reach. Pt instructed to call for assistance. Pt verbalizes understanding. Pt afebrile at this time.  Proper hand hygiene performed before and after pt care.     R side Chevron incision dressed per nursing communication orders with hydrofera + foam dressing. Changing 2x/week. Dressing change due (12/21).   Diuresing BID with PO Lasix. Pt encouraged to use hat to measure UOP. Paracentesis scheduled for (12/20).  1u PRBC administered (12/19). Monitoring H&H with AM labs.   LFTs and Cr stable. WBC dec.  TPN + Insulin gtt d/c'ed tonight per orders.  Monitoring BG ACHS + 0200.  Pt tachy HR , scheduled Lopressor continued.  Contact precautions maintained for ESBL in urine.  Ambulated jones with .  Self meds encouraged.  Possible d/c Friday (12/21) pending continued improvement.    at bedside attentive to needs.    Will continue to monitor patient.

## 2018-12-20 NOTE — PLAN OF CARE
Problem: Occupational Therapy Goal  Goal: Occupational Therapy Goal  Goals to be met by: 12/22/18    Patient will increase functional independence with ADLs by performing:    UE Dressing with Supervision.  LE Dressing with Supervision. progressing  Grooming while standing at sink with Supervision.   Toileting from toilet with Supervision for hygiene and clothing management.   Toilet transfer to toilet with Supervision.          Outcome: Ongoing (interventions implemented as appropriate)  Pt progressing well with goals. Continue with POC.   Laura corado OT  12/20/2018

## 2018-12-20 NOTE — ASSESSMENT & PLAN NOTE
- Dietary consulted.   - Temporal and distal extremity muscle wasting and edema.  - Encourage supplements and to increase nutritional intake.  - Per nephrology, pt to be on low protein diet.  - Prealbumin reviewed.   - Tolerating diet.  Denies n/v.    - Attempted low fat, high protein diet due to suspicion for chyle leak with no improvement seen in fluid character.  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 Had advanced diet with return of milky drainage,   - TPN stopped yesterday - PO intake improving.  Monitor

## 2018-12-20 NOTE — ASSESSMENT & PLAN NOTE
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   - POD 1 US: increased arterial resistive indices, suggestive of edema vs acute rejection vs congestion.  - Repeat US 10/9 with continued elevation of RIs.  - LFTs stable.  - T bili trending down.   - Repeat liver US (10/11): elevated RIs.  - Liver US 11/6 to assess for ascites. No arterial flow within the liver allograft concerning for interval thrombosis/occlusion of the arterial system. Severe ascites seen.  - Liver US 11/8: arterial flow seen w/ elevated RIs   - LFTs remain stable. No CTA at this time due to EVA and normal liver function.   - Liver US 11/15 showed "abnormally decreased main hepatic artery peak systolic velocity with slow arterial flow and abnormal tardus parvus waveforms intrahepatically."   -Vascular surgery consulted  - Liver US 11/19 showed no arterial flow on the right and sluggish flow on the left.   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 No further intervention planned at this time as patient's LFTs are stable. Will monitor.  - CT abd/pelvis 11/26 showed: 1. Persistent large volume of ascites. 2. Large right and moderate left pleural effusions with adjacent basilar atelectasis. 3. Anasarca  - IR placed perc drain 11/29 due to suspicion for chyle leak. 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 Octreotide dc'd 12/18  - Fluid growing enterococcus - treatment with Vanc x 10 days per ID recs, completed.   - perc drain removed 12/17  - Paracentesis scheduled for this afternoon. Fluid analysis pending.  "

## 2018-12-20 NOTE — ASSESSMENT & PLAN NOTE
- Valcyte for CMV prophylaxis--> held 10/31 for leukopenia. Pt will need weekly CMV PCR while discontinued.  - CMV PCR noted to be positive at 129.  - CMV PCR 11/8: 105  - CMV PCR 11/15 with 89 copies  - CMV PCR 11/23 95 - increased from previous. Continue to hold Valcyte & monitor pt closely.  - CMV PCR 11/29 146.  - CMV PCR 12/6 529 - start treatment dose valcyte 12/9/18  - CMV PCR 12/13 < 35.  - CMV PCR 12/20 pending  - Bactrim for PCP prophylaxis ([facility]).--> holding (10/31) for leukopenia.   - Started Atovaquone 11/6.  - Fluconazole for fungal prophylaxis (first dose 10/6). D/c Fluc 11/16 bc pt had been on it for > 30 days post take back.

## 2018-12-20 NOTE — ASSESSMENT & PLAN NOTE
BG goal 140 - 180       Novolog to 4-6 units with meals; give half the scheduled insulin if BG . Hold if less than 90.   Low dose correction  BG monitoring //0200      Discharge planning: TBD

## 2018-12-20 NOTE — PROGRESS NOTES
Pt arrived to MPU room 1 for paracentesis.  Name verified using two identifiers.  Allergies verified.  Will continue to monitor.

## 2018-12-20 NOTE — PLAN OF CARE
Pt AAOx4, afebrile, sinus tach 100s. 1 unit of blood given today. TPN at 21 ml/hr, insulin at 0.6 unit/hr, plan to d/c both tonight. BG remains elevated throughout day. Pt c/o feeling weak and not having a good appetite. No c/o pain today. Pt in recliner, wheels locked, call light in reach,  at bedside.

## 2018-12-20 NOTE — PT/OT/SLP PROGRESS
Occupational Therapy   Treatment    Name: Scarlett Reddy  MRN: [id number]  Admitting Diagnosis:  Liver transplanted  31 Days Post-Op    Recommendations:     Discharge Recommendations: (HHOT/PT)  Discharge Equipment Recommendations:  commode, walker, rolling  Barriers to discharge:  None    Subjective     Pain/Comfort:  · Pain Rating 1: 0/10  · Pain Rating Post-Intervention 1: 0/10    Objective:     Communicated with: RN prior to session.  Patient found with all lines intact, call button in reach and RN notified and telemetry, pulse ox (continuous) upon OT entry to room.    General Precautions: Standard, fall, contact   Orthopedic Precautions:N/A   Braces: N/A     Occupational Performance:    Bed Mobility:    · NT, pt found seated UIC upon arrival.     Functional Mobility/Transfers:  · Patient completed Sit <> Stand Transfer with stand by assistance  with  rolling walker   · Functional Mobility: Pt completed functional mobility in hallway ( 200ft) using RW and SBA. She tolerated well with no LOB or SOB.     Activities of Daily Living:  · Upper Body Dressing: stand by assistance to casper gown like jacket while seated UIC  · Lower Body Dressing: Minimal assistance to casper pants to knees while sitting; CGA to casper to waist while standing; SBA to casper B slippers    AMPAC 6 Click ADL: 20    Treatment & Education:  -Pt edu on OT role/POC  -Importance of OOB activity with staff assistance ( UIC throughout the day)  -Safety during functional t/f and mobility  - White board updated  - Multiple self care tasks completed-- assistance level noted above  - All questions/concerns answered within OT scope of practice  - Pt completed BUE/BLE AROM exercises while seated UIC including: 1x15 reps in shoulder flexion, elbow flexion/extension, chest press, and scapular squeezes; hip flexion, knee flexion/extension, plantar flexion ( in standing using RW for support).  Pt tolerated well with min rest breaks between each exercise.     Patient  left up in chair with all lines intact, call button in reach and RN notified  Education:    Assessment:     Scarlett Reddy is a 68 y.o. female with a medical diagnosis of Liver transplanted.  She presents with the following performance deficits affecting function are weakness, gait instability, impaired endurance, impaired self care skills, edema. Pt tolerated treatment session well and progressing with goals. Due 2/2 significant progression with goals frequency decreased to 2x per week. She would benefit from HHOT following d/c to continue to progress towards goals and ensure safe transition to home environment.     Rehab Prognosis:  Good; patient would benefit from acute skilled OT services to address these deficits and reach maximum level of function.       Plan:     Patient to be seen 2 x/week to address the above listed problems via self-care/home management, therapeutic activities, therapeutic exercises, neuromuscular re-education  · Plan of Care Expires: 01/09/19  · Plan of Care Reviewed with: patient    This Plan of care has been discussed with the patient who was involved in its development and understands and is in agreement with the identified goals and treatment plan    GOALS:   Multidisciplinary Problems     Occupational Therapy Goals        Problem: Occupational Therapy Goal    Goal Priority Disciplines Outcome Interventions   Occupational Therapy Goal     OT, PT/OT Ongoing (interventions implemented as appropriate)    Description:  Goals to be met by: 12/22/18    Patient will increase functional independence with ADLs by performing:    UE Dressing with Supervision.  LE Dressing with Supervision. progressing  Grooming while standing at sink with Supervision.   Toileting from toilet with Supervision for hygiene and clothing management.   Toilet transfer to toilet with Supervision.                            Time Tracking:     OT Date of Treatment: 12/20/18  OT Start Time: 0928  OT Stop Time: 0943  OT  Total Time (min): 15 min    Billable Minutes:Therapeutic Exercise 15    Laura corado, OT  12/20/2018

## 2018-12-20 NOTE — PROGRESS NOTES
Insufficient fluid seen with use of ultrasound to safely proceed with paracentesis. See NP note.  Floor nurse updated.

## 2018-12-20 NOTE — PROGRESS NOTES
Patient transferred to ultrasound per stretcher by transport henry Duque () at bedside to assist with consent. No acute distress noted. Will monitor for return.

## 2018-12-20 NOTE — PLAN OF CARE
Problem: Patient Care Overview  Goal: Plan of Care Review  - Patient is AAOx4, ambulates with standby assistance and walker. Patient educated to use call bell for assistance, verbalized understanding. Patient's  at bedside attentive to patient's needs.   - Afebrile, WBC 2.84   - R side chevron incision assessed by wound care - no need for packing - cleanse with saline and apply foam dressing 2 x a week   - No fluid to collect for paracentesis   - CR 1.1 - urine output 750 cc so far this shift   - Accuchecks AC/HS - mealtime and SSI administered per order   - No complaints of pain   - See flow sheet for complete assessment details   - Possible discharge tomorrow

## 2018-12-20 NOTE — ASSESSMENT & PLAN NOTE
- HBsAg+, Received HBIG (last weekly dose 11/2). Now will continue with monthly dosing per protocol for Hep B + D. Next dose due 1/20.  - Tx w/ Entecavir- dose changed to daily 11/26 due to improved renal fx.  - 12/3 Hep B surface antigen negative 12/3, surface AB quant 571, and Hep B DNA <10.  - Continue to monitor, per protocol.

## 2018-12-20 NOTE — ASSESSMENT & PLAN NOTE
- Fluctuating H&H.    - Transfusions: 10/14; 10/16; 10/18; 10/19; 10/22; 10/27; 10/29, 10/31, 11/3, 11/21, 11/29, 12/4, 12/13.  - FOBT +  - LDH elevated, Hapto < 10.  - EGD 10/18: ulcerated duodenal mucosa.   - Consulted GI about this ongoing issue and further need of another EGD.  - EGD 11/5 unremarkable.   - Iron studies 11/13:  95, TIBC 107, sat iron 89, transferrin 72.  - Hemolytic anemia workup 11/13: Haptoglobin 132, retic 3.9 .  - 12/13 - transfused 1u PRBC, minimal improvement in H/H.  - 12/20- 1u pRBC transfused w/ good response  - No overt signs of bleeding.  - Continue to monitor.

## 2018-12-20 NOTE — ASSESSMENT & PLAN NOTE
- Paracentesis 10/1, 5L removed, no fluid sent for analysis.  - Pt remains volume overloaded.   - Liver US 11/6 with severe ascites.   - Paracentesis 11/7 with 8.9 L removed. Wbc 39, segs 16%. Gram stain - no organisms seen and no WBC. Cultures NGTD.  - Paracentesis 11/12: 8.6 L removed. 34 wbc, 25% segs, 33 lymphs, 42 monocytes/macrophages. Cultures NGTD  - Paracentesis 11/21 with 4 L removed, neg for infection.  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 Milky appearance, cell count of fluid (51% lymphs), triglycerides 2208, consistent with chyle leak.   - Peritoneal fluid 11/29 growing enterococcus - treatment with Vanc x 10 days (completed 12/9) per ID recs.   - Attempted low fat, high protein diet due to suspicion for chyle leak with no improvement seen in fluid character.  - CT abd/pelvis 12/3 shows small volume abdominal ascites, improved compared to prior, pelvis ascites unchanged from prior, unchanged b/l pleural effusions (large on R, moderate on L) with bibasilar compressive atelectasis and consolidative changes.  - Placed PICC line and started TPN + octreotide 12/3. Improvement in color of fluid seen 12/6 - 12/7 (less milky, more abbie).   - Perc drain removed 12/17  - TPN d/c'd yesterday (12/19)  - Decrease lasix from 40 mg BID to 20 mg daily as volume status improving.  - Paracentesis scheduled for this afternoon.

## 2018-12-20 NOTE — SUBJECTIVE & OBJECTIVE
Scheduled Meds:   aspirin  81 mg Oral Daily    atovaquone  1,500 mg Oral Daily    entecavir  0.5 mg Oral Daily    furosemide  40 mg Oral BID loop    insulin aspart U-100  4-6 Units Subcutaneous TIDWM    magnesium oxide  800 mg Oral BID    metoprolol tartrate  25 mg Oral BID    pantoprazole  40 mg Oral BID    sodium chloride 0.9%  10 mL Intravenous Q6H    tacrolimus  2 mg Oral BID    valGANciclovir  450 mg Oral BID     Continuous Infusions:   insulin (HUMAN R) infusion (adults) Stopped (12/19/18 2305)     PRN Meds:sodium chloride, sodium chloride, acetaminophen, bisacodyl, calcium carbonate, dextrose 50%, dextrose 50%, glucagon (human recombinant), glucose, glucose, hydrOXYzine HCl, insulin aspart U-100, ondansetron, ondansetron, polyethylene glycol, Flushing PICC Protocol **AND** sodium chloride 0.9% **AND** sodium chloride 0.9%, sodium chloride 0.9%, traMADol    Review of Systems   Constitutional: Positive for activity change, appetite change (improving) and fatigue. Negative for chills and fever.   HENT: Negative for congestion and facial swelling.    Eyes: Negative for pain and discharge.   Respiratory: Negative for cough, chest tightness, shortness of breath and wheezing.    Cardiovascular: Positive for leg swelling. Negative for chest pain and palpitations.   Gastrointestinal: Positive for abdominal distention and abdominal pain. Negative for diarrhea, nausea and vomiting.   Endocrine: Negative.    Genitourinary: Negative for decreased urine volume and difficulty urinating.   Musculoskeletal: Negative for back pain.   Skin: Positive for wound.   Allergic/Immunologic: Positive for immunocompromised state.   Neurological: Positive for weakness.   Hematological: Negative.    Psychiatric/Behavioral: Positive for dysphoric mood and sleep disturbance.     Objective:     Vital Signs (Most Recent):  Temp: 97.6 °F (36.4 °C) (12/20/18 0738)  Pulse: 107 (12/20/18 0738)  Resp: 18 (12/20/18 0738)  BP: (!) 114/55  (12/20/18 0738)  SpO2: 96 % (12/20/18 0738) Vital Signs (24h Range):  Temp:  [97.4 °F (36.3 °C)-98.7 °F (37.1 °C)] 97.6 °F (36.4 °C)  Pulse:  [] 107  Resp:  [15-23] 18  SpO2:  [96 %-98 %] 96 %  BP: (108-129)/(55-81) 114/55     Weight: 56.8 kg (125 lb 3.5 oz)  Body mass index is 22.18 kg/m².    Intake/Output - Last 3 Shifts       12/18 0700 - 12/19 0659 12/19 0700 - 12/20 0659 12/20 0700 - 12/21 0659    P.O. 650 1050     I.V. (mL/kg)       Blood  220     TPN       Total Intake(mL/kg) 650 (11.3) 1270 (22.4)     Urine (mL/kg/hr) 1000 (0.7)      Drains       Stool 0      Total Output 1000      Net -350 +1270            Urine Occurrence 2 x 6 x     Stool Occurrence 1 x 1 x 1 x    Emesis Occurrence  0 x           Physical Exam   Constitutional: She is oriented to person, place, and time. She appears well-developed. No distress.   Hand and temporal muscle wasting   HENT:   Head: Normocephalic and atraumatic.   Eyes: EOM are normal. No scleral icterus.   Neck: Normal range of motion. Neck supple. No JVD present.   Cardiovascular: Regular rhythm, normal heart sounds and intact distal pulses. Tachycardia present.   Pulmonary/Chest: Effort normal. No respiratory distress.   Decreased breath sounds at bases   Abdominal: Soft. Bowel sounds are normal. She exhibits distension and ascites. There is no tenderness.       Chevron incision healing  Prior perc drain site w/ RLQ stitch x 1      Musculoskeletal: Normal range of motion. She exhibits edema.   Neurological: She is alert and oriented to person, place, and time.   Skin: Skin is warm and dry. She is not diaphoretic.   Psychiatric: She has a normal mood and affect. Her behavior is normal.   Nursing note and vitals reviewed.      Laboratory:  Immunosuppressants         Stop Route Frequency     tacrolimus capsule 2 mg      -- Oral 2 times daily        CBC:   Recent Labs   Lab 12/20/18  0531   WBC 2.84*   RBC 2.66*   HGB 8.0*   HCT 25.1*   PLT 95*   MCV 94   MCH 30.1   MCHC  31.9*     CMP:   Recent Labs   Lab 12/20/18  0531   *   CALCIUM 8.2*   ALBUMIN 2.6*   PROT 4.8*      K 3.7   CO2 27      BUN 52*   CREATININE 1.1   ALKPHOS 206*   ALT 22   AST 24   BILITOT 1.3*     Labs within the past 24 hours have been reviewed.    Diagnostic Results:  I have personally reviewed all pertinent imaging studies.

## 2018-12-20 NOTE — PROGRESS NOTES
[facility]  Liver Transplant  Progress Note    Patient Name: Scarlett Reddy  MRN: [id number]  Admission Date: 10/1/2018  Hospital Length of Stay: 79 days  Code Status: Full Code  Primary Care Provider: Primary Doctor No  Post-Operative Day: 76    ORGAN:   LIVER  Disease Etiology: Cirrhosis: Type B and D  Donor Type:    - Brain Death  CDC High Risk:   No  Donor CMV Status:   Donor CMV Status: Positive  Donor HBcAB:   Negative  Donor HCV Status:   Negative  Donor HBV SETH: Negative  Donor HCV SETH: Negative  Whole or Partial: Whole Liver  Biliary Anastomosis: End to End  Arterial Anatomy: Replaced Left Hepatic and Right Hepatic  Subjective:     History of Present Illness:  Ms. Reddy is a 69 y/o female with PMH of ESLD secondary Hep B and D.  Listed for liver transplant with MELD 23.  Paracentesis 10/1 with 5L removed, no fluid sent for analysis.  Morning labs significant for hyponatremia, Na 119.  Pt admitted to LTS for sodium monitoring.    69 y/o F h/o HBV/delta cirrhosis s/p DBDLT 10/5/18 (CMV D+/R+, steroid induction, MMF/tacro/pred maintenance) with take back on 10/6 2/2 hyperbilirubinema and bilious VALORIE drainage, no leak identified. Post-op course c/b hypercapneic and hypoxic respiratory failure and was reintubated though quickly extubated now doing well. Liver bx (10/6) sig for Zone 3 hemorrhage and collapse, in addition to cholestasis. Transferred from ICU on POD #4. ID consulted to comment on positive diphtheroid culture from ascitic fluid (likely skin colonization) as well as ESBL Klebsiella pneumoniae in urine culture (10/4).  Pt asymptomatic and cell count from ascitic fluid unremarkable. Per ID recs, no need to treat diphtheroids or asymptomatic ESBL Kleb pneumo bacteriuria though pt has had 6 days of therapy which would cover these organisms. Mild peritransplant ascites- repeat US 10/6 with increased arterial resistive indices. Repeat US 10/9 w/ continued elevation in RIs and fluid  collections. LFTs trending down nicely.         Hospital Course:   Acute gradual worsening of renal function s/p OLTx, Nephrology consulted for post-op EVA. Creatinine on arrival 0.8 and trended up but pt remained with good UOP. Had stable response to Diuresis. Pt also with persistently elevated BUN, required multiple rounds of dialysis (10/15, 10/17, 10/20, 10/31), although not much improvement noted. Kidney function has since improved, with Cr now returned to baseline.     In addition, pt H/H cont to fluctuate requiring multiple blood transfusions (10/14, 10/16, 10/18, 10/19, 10/22, 10/27, 10/29).  Pt reported dark stools 10/15 but color has normalized. FOBT+. EGD 10/18 consistent with ulcerated mucosa in duodenum s/p coagulation. She was started on Protonix 40mg and completed course of amoxicillin/levofloxacin on 11/2 for concern of H. Pylori. Biopsy negative for infection and CMV. Transfuse as needed with goal Hb > 7.0.  Urine cx + Klebsiella pneumoniae (10/13). Received 3 day course of ciprofloxacin, dc'd 10/19. Patient finished 7 day course of Ertapenem for kleb pneumo ESBL UTI 11/29. Of note, pt grew same bacteria from wound 11/16. Purulent drainage noted from middle of chevron incision (11/16). Wound cx grew klebsiella p. CT scan showed no signs of infection. Area opened with wound vac placed 11/24, removed 12/7.  On 10/19 pm pt c/o crushing chest pain. Troponin 0.094, likely 2/2 ischemic stress. EKG NSR.     Liver US 11/6 showed no arterial flow. Repeat Liver US 11/8 showed arterial flow with increased RIs. 11/15 liver u/s showed abnormally decreased main hepatic artery peak systolic velocity with slow arterial flow and abnormal tardus parvus waveforms intrahepatically. Consulted vascular surgery. Repeat liver US 11/19 showed no arterial flow on R and sluggish flow on L. Hepatic angiogram 11/19 confirmed HAT. Liver US to assess recanalization post HAT 12/4 -> conclusion that pt may recanalize. Will monitor  closely with no further intervention planned at this time.     Pt with orthopnea. CT 11/26 showed large right and moderate left pleural effusions with adjacent basilar atelectasis. Pt sp02 stable on RA, but c/o some orthopnea. Chest xray and echo 12/9 with pleural effusions. Right thoracentesis with chest tube placement 12/11, fluid negative for infection. Chest tube removed, 12/13 w/o any issue. CXR obtained- no pneumo noted. Bilateral pleural effusion still present. Pt with continued worsening abdominal distention & LE edema. Multiple paracentesis (11/7, 11/12, 11/21- negative for infection) for ongoing ascites. 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 Milky appearance, cell count of fluid (51% lymphs), triglycerides 2208, consistent with chyle leak. Attempted low fat, high protein diet with no improvement seen in fluid character. 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 Peritoneal fluid 11/29 w/ Enterococcus. Per ID recs, treated with 10 day course of Vancomycin. Perc drain removed 12/17.     Of note, Valcyte held 10/31 for leukopenia. CMV PCR noted to be positive at 129 on 11/1. Held off on treatment with valcyte, however weekly CMV PCRs increased (12/6 with 529 copies), started treatment dose 12/9/18. Last CMV PCR 12/13 < 35 copies. Cont to monitor w/ weekly CMV PCR.     Interval history: No acute events overnight. LFTs remain stable. Pt's LE edema improving. Decrease lasix from 40 mg BID to 20 mg daily. Paracentesis scheduled for this afternoon. Peritoneal fluid to be sent for lab analysis.Transfused 1u pRBC yesterday with good response. PO intake improving. TPN d/c'd yesterday. Plan for d/c to  tomorrow 12/21 if pt remains stable. Cont to monitor.     Scheduled Meds:   aspirin  81 mg Oral Daily    atovaquone  1,500 mg Oral Daily    entecavir  0.5 mg Oral Daily    furosemide  40 mg Oral BID loop    insulin aspart U-100  4-6 Units Subcutaneous TIDWM    magnesium oxide  800 mg  Oral BID    metoprolol tartrate  25 mg Oral BID    pantoprazole  40 mg Oral BID    sodium chloride 0.9%  10 mL Intravenous Q6H    tacrolimus  2 mg Oral BID    valGANciclovir  450 mg Oral BID     Continuous Infusions:   insulin (HUMAN R) infusion (adults) Stopped (12/19/18 2305)     PRN Meds:sodium chloride, sodium chloride, acetaminophen, bisacodyl, calcium carbonate, dextrose 50%, dextrose 50%, glucagon (human recombinant), glucose, glucose, hydrOXYzine HCl, insulin aspart U-100, ondansetron, ondansetron, polyethylene glycol, Flushing PICC Protocol **AND** sodium chloride 0.9% **AND** sodium chloride 0.9%, sodium chloride 0.9%, traMADol    Review of Systems   Constitutional: Positive for activity change, appetite change (improving) and fatigue. Negative for chills and fever.   HENT: Negative for congestion and facial swelling.    Eyes: Negative for pain and discharge.   Respiratory: Negative for cough, chest tightness, shortness of breath and wheezing.    Cardiovascular: Positive for leg swelling. Negative for chest pain and palpitations.   Gastrointestinal: Positive for abdominal distention and abdominal pain. Negative for diarrhea, nausea and vomiting.   Endocrine: Negative.    Genitourinary: Negative for decreased urine volume and difficulty urinating.   Musculoskeletal: Negative for back pain.   Skin: Positive for wound.   Allergic/Immunologic: Positive for immunocompromised state.   Neurological: Positive for weakness.   Hematological: Negative.    Psychiatric/Behavioral: Positive for dysphoric mood and sleep disturbance.     Objective:     Vital Signs (Most Recent):  Temp: 97.6 °F (36.4 °C) (12/20/18 0738)  Pulse: 107 (12/20/18 0738)  Resp: 18 (12/20/18 0738)  BP: (!) 114/55 (12/20/18 0738)  SpO2: 96 % (12/20/18 0738) Vital Signs (24h Range):  Temp:  [97.4 °F (36.3 °C)-98.7 °F (37.1 °C)] 97.6 °F (36.4 °C)  Pulse:  [] 107  Resp:  [15-23] 18  SpO2:  [96 %-98 %] 96 %  BP: (108-129)/(55-81) 114/55      Weight: 56.8 kg (125 lb 3.5 oz)  Body mass index is 22.18 kg/m².    Intake/Output - Last 3 Shifts       12/18 0700 - 12/19 0659 12/19 0700 - 12/20 0659 12/20 0700 - 12/21 0659    P.O. 650 1050     I.V. (mL/kg)       Blood  220     TPN       Total Intake(mL/kg) 650 (11.3) 1270 (22.4)     Urine (mL/kg/hr) 1000 (0.7)      Drains       Stool 0      Total Output 1000      Net -350 +1270            Urine Occurrence 2 x 6 x     Stool Occurrence 1 x 1 x 1 x    Emesis Occurrence  0 x           Physical Exam   Constitutional: She is oriented to person, place, and time. She appears well-developed. No distress.   Hand and temporal muscle wasting   HENT:   Head: Normocephalic and atraumatic.   Eyes: EOM are normal. No scleral icterus.   Neck: Normal range of motion. Neck supple. No JVD present.   Cardiovascular: Regular rhythm, normal heart sounds and intact distal pulses. Tachycardia present.   Pulmonary/Chest: Effort normal. No respiratory distress.   Decreased breath sounds at bases   Abdominal: Soft. Bowel sounds are normal. She exhibits distension and ascites. There is no tenderness.       Chevron incision healing  Prior perc drain site w/ RLQ stitch x 1      Musculoskeletal: Normal range of motion. She exhibits edema.   Neurological: She is alert and oriented to person, place, and time.   Skin: Skin is warm and dry. She is not diaphoretic.   Psychiatric: She has a normal mood and affect. Her behavior is normal.   Nursing note and vitals reviewed.      Laboratory:  Immunosuppressants         Stop Route Frequency     tacrolimus capsule 2 mg      -- Oral 2 times daily        CBC:   Recent Labs   Lab 12/20/18  0531   WBC 2.84*   RBC 2.66*   HGB 8.0*   HCT 25.1*   PLT 95*   MCV 94   MCH 30.1   MCHC 31.9*     CMP:   Recent Labs   Lab 12/20/18  0531   *   CALCIUM 8.2*   ALBUMIN 2.6*   PROT 4.8*      K 3.7   CO2 27      BUN 52*   CREATININE 1.1   ALKPHOS 206*   ALT 22   AST 24   BILITOT 1.3*     Labs within  "the past 24 hours have been reviewed.    Diagnostic Results:  I have personally reviewed all pertinent imaging studies.    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   - POD 1 US: increased arterial resistive indices, suggestive of edema vs acute rejection vs congestion.  - Repeat US 10/9 with continued elevation of RIs.  - LFTs stable.  - T bili trending down.   - Repeat liver US (10/11): elevated RIs.  - Liver US 11/6 to assess for ascites. No arterial flow within the liver allograft concerning for interval thrombosis/occlusion of the arterial system. Severe ascites seen.  - Liver US 11/8: arterial flow seen w/ elevated RIs   - LFTs remain stable. No CTA at this time due to EVA and normal liver function.   - Liver US 11/15 showed "abnormally decreased main hepatic artery peak systolic velocity with slow arterial flow and abnormal tardus parvus waveforms intrahepatically."   -Vascular surgery consulted  - Liver US 11/19 showed no arterial flow on the right and sluggish flow on the left.   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 No further intervention planned at this time as patient's LFTs are stable. Will monitor.  - CT abd/pelvis 11/26 showed: 1. Persistent large volume of ascites. 2. Large right and moderate left pleural effusions with adjacent basilar atelectasis. 3. Anasarca  - IR placed perc drain 11/29 due to suspicion for chyle leak. 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 Octreotide dc'd 12/18  - Fluid growing enterococcus - treatment with Vanc x 10 days per ID recs, completed.   - perc drain removed 12/17  - Paracentesis scheduled for this afternoon. Fluid analysis pending.     Weakness    - Continue PT.  - Monitor.       Orthopnea    - See "sleep in sitting " "position..."     Alteration in skin integrity    - Monitor.       Sleeps in sitting position due to orthopnea    - obtain chest xray and echo.  - thoracentesis with chest tube placement 12/11/18, removed 12/13. No pneumo noted on CXR.       Hypoalbuminemia    - Continue to monitor.     Sequelae of hyperalimentation    - Monitor.       Pleural effusion    - CT 11/26 showed: Large right and moderate left pleural effusions with adjacent basilar atelectasis.  - Pt sp02 stable on RA, but c/o some orthopnea.  - CT abd/pelvis 12/3 showed unchanged b/l pleural effusions (large on R, moderate on L) with bibasilar compressive atelectasis and consolidative changes.  - Sp02 stable & no signs of respiratory infection.  - repeat echo and chest xray with pleural effusion.  - thoracentesis with chest tube placement 12/11/18 - fluid negative for infection.  - CXR 12/12 w/ improvements noted.  - Chest tube removed 12/13- CXR w/ no pneumo. Bilateral pleural effusion still present.  - Monitor     Hypomagnesemia    - Continue PO Mag Oxide.  - Continue to monitor.       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     Hypophosphatemia    - Monitor and replace as needed.       Delayed surgical wound healing    - Middle chevron incision opened 11/16 with purulent drainage.  - Packed with Aquacel ag at this time. Growing Klebsiella p.   - CT abd/pelvis 11/17- no signs of infection.  - 11/17: R side of chevron incision w/ 3 small holes draining purulent fluid, opened and packed w/ aquacel.   - Wound care following.  - Wound w/ increased drainage, so wound care placed vac 11/24/18.  Apprec recs. Extending Ertapenem thru 11/28 per ID.    - Abdomen erythematous and indurated RLQ inferior to chevron.  - CT abd/pelvis 11/26 - no sign of infection.  - Last wound vac change 12/7 with improvements noted in wound - depth now 0.5cm.   - Wound vac dc'd     Hepatic artery thrombosis of transplanted liver    - " "Liver US 11/19 showed no arterial flow to R and sluggish flow to L.  - Hepatic angiogram 11/19 confirmed HAT.  - Liver US 12/4 to evaluate for recanalization reviewed. Recanalization possible. Will continue to monitor.  - No further intervention planned at this time. Will continue to monitor.     Drug-induced pancytopenia    - Decreased plt, wbc, h/h.  - See "leukopenia"  - Continue to monitor.     Thrombocytopenia, unspecified    - Monitor.       Leukopenia    - Stopped Cellcept, Bactrim, and Valcyte 10/31.  - Cellcept half dose resumed 11/6  - Started Atovaquone 11/6.   - CMV PCR noted to be positive at 129 (11/1). Held off on treatment with valcyte, however weekly CMV PCRs increasing, start treatment dose 12/9/18.  - Cellcept held 11/23 2/2 infection (UTI + wound).         At risk for opportunistic infections    - Valcyte for CMV prophylaxis--> held 10/31 for leukopenia. Pt will need weekly CMV PCR while discontinued.  - CMV PCR noted to be positive at 129.  - CMV PCR 11/8: 105  - CMV PCR 11/15 with 89 copies  - CMV PCR 11/23 95 - increased from previous. Continue to hold Valcyte & monitor pt closely.  - CMV PCR 11/29 146.  - CMV PCR 12/6 529 - start treatment dose valcyte 12/9/18  - CMV PCR 12/13 < 35.  - CMV PCR 12/20 pending  - Bactrim for PCP prophylaxis (MWF).--> holding (10/31) for leukopenia.   - Started Atovaquone 11/6.  - Fluconazole for fungal prophylaxis (first dose 10/6). D/c Fluc 11/16 bc pt had been on it for > 30 days post take back.       Long-term use of immunosuppressant medication    - Prograf: stopped 10/29. Resumed 11/2.  --> Will monitor for signs of toxic side effects, check daily troughs, and change meds accordingly.   - Prednisone: stopped 10/29. Started on Hydrocortisone. pred taper resumed.  - Cellcept: stopped 10/31 for leukopenia. Restarted Cellcept 500 mg bid, 11/6.  - Cellcept held 11/23 2/2 infection.         Prophylactic immunotherapy    - See long term use immunosuppresion.     "   Other ascites    - Paracentesis 10/1, 5L removed, no fluid sent for analysis.  - Pt remains volume overloaded.   - Liver US 11/6 with severe ascites.   - Paracentesis 11/7 with 8.9 L removed. Wbc 39, segs 16%. Gram stain - no organisms seen and no WBC. Cultures NGTD.  - Paracentesis 11/12: 8.6 L removed. 34 wbc, 25% segs, 33 lymphs, 42 monocytes/macrophages. Cultures NGTD  - Paracentesis 11/21 with 4 L removed, neg for infection.  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 Milky appearance, cell count of fluid (51% lymphs), triglycerides 2208, consistent with chyle leak.   - Peritoneal fluid 11/29 growing enterococcus - treatment with Vanc x 10 days (completed 12/9) per ID recs.   - Attempted low fat, high protein diet due to suspicion for chyle leak with no improvement seen in fluid character.  - CT abd/pelvis 12/3 shows small volume abdominal ascites, improved compared to prior, pelvis ascites unchanged from prior, unchanged b/l pleural effusions (large on R, moderate on L) with bibasilar compressive atelectasis and consolidative changes.  - Placed PICC line and started TPN + octreotide 12/3. Improvement in color of fluid seen 12/6 - 12/7 (less milky, more abbie).   - Perc drain removed 12/17  - TPN d/c'd yesterday (12/19)  - Decrease lasix from 40 mg BID to 20 mg daily as volume status improving.  - Paracentesis scheduled for this afternoon.       Physical deconditioning    - PT/OT consulted.  Progressing well w/ therapy.  - Recommend home health PT and rolling walker at discharge (orders placed 11/6).       Anemia requiring transfusions    - Fluctuating H&H.    - Transfusions: 10/14; 10/16; 10/18; 10/19; 10/22; 10/27; 10/29, 10/31, 11/3, 11/21, 11/29, 12/4, 12/13.  - FOBT +  - LDH elevated, Hapto < 10.  - EGD 10/18: ulcerated duodenal mucosa.   - Consulted GI about this ongoing issue and further need of another EGD.  - EGD 11/5 unremarkable.   - Iron studies 11/13:  95, TIBC 107, sat  iron 89, transferrin 72.  - Hemolytic anemia workup 11/13: Haptoglobin 132, retic 3.9 .  - 12/13 - transfused 1u PRBC, minimal improvement in H/H.  - 12/20- 1u pRBC transfused w/ good response  - No overt signs of bleeding.  - Continue to monitor.     Severe malnutrition    - Dietary consulted.   - Temporal and distal extremity muscle wasting and edema.  - Encourage supplements and to increase nutritional intake.  - Per nephrology, pt to be on low protein diet.  - Prealbumin reviewed.   - Tolerating diet.  Denies n/v.    - Attempted low fat, high protein diet due to suspicion for chyle leak with no improvement seen in fluid character.  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 Had advanced diet with return of milky drainage,   - TPN stopped yesterday - PO intake improving.  Monitor      Type 2 diabetes mellitus with diabetic polyneuropathy, with long-term current use of insulin    - Insulin drip d/c'd as pt no longer on TPN.  - Endocrine following. Appreciate recs.       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 Now will continue with monthly dosing per protocol for Hep B + D. Next dose due 1/20.  - Tx w/ Entecavir- dose changed to daily 11/26 due to improved renal fx.  - 12/3 Hep B surface antigen negative 12/3, surface AB quant 571, and Hep B DNA <10.  - Continue to monitor, per protocol.         VTE Risk Mitigation (From admission, onward)        Ordered     IP VTE HIGH RISK PATIENT  Once      11/05/18 1145     Place sequential compression device  Until discontinued      10/05/18 0709          The patients clinical status was discussed at multidisplinary rounds, involving transplant surgery, transplant medicine, pharmacy, nursing, nutrition, and social work    Discharge Planning:  No Patient Care Coordination Note on file.      Elizabeth Dean PA-C  Liver Transplant  [facility]

## 2018-12-21 ENCOUNTER — TELEPHONE (OUTPATIENT)
Dept: ENDOCRINOLOGY | Facility: HOSPITAL | Age: 68
End: 2018-12-21

## 2018-12-21 VITALS
DIASTOLIC BLOOD PRESSURE: 53 MMHG | WEIGHT: 125.44 LBS | RESPIRATION RATE: 20 BRPM | OXYGEN SATURATION: 98 % | HEIGHT: 63 IN | HEART RATE: 101 BPM | TEMPERATURE: 98 F | SYSTOLIC BLOOD PRESSURE: 120 MMHG | BODY MASS INDEX: 22.23 KG/M2

## 2018-12-21 DIAGNOSIS — Z79.4 TYPE 2 DIABETES MELLITUS WITH DIABETIC POLYNEUROPATHY, WITH LONG-TERM CURRENT USE OF INSULIN: Primary | ICD-10-CM

## 2018-12-21 DIAGNOSIS — Z94.4 LIVER REPLACED BY TRANSPLANT: Primary | ICD-10-CM

## 2018-12-21 DIAGNOSIS — E11.42 TYPE 2 DIABETES MELLITUS WITH DIABETIC POLYNEUROPATHY, WITH LONG-TERM CURRENT USE OF INSULIN: Primary | ICD-10-CM

## 2018-12-21 PROBLEM — R06.01 SLEEPS IN SITTING POSITION DUE TO ORTHOPNEA: Status: RESOLVED | Noted: 2018-12-09 | Resolved: 2018-12-21

## 2018-12-21 LAB
ALBUMIN SERPL BCP-MCNC: 2.7 G/DL
ALP SERPL-CCNC: 224 U/L
ALT SERPL W/O P-5'-P-CCNC: 29 U/L
ANION GAP SERPL CALC-SCNC: 9 MMOL/L
AST SERPL-CCNC: 27 U/L
BASOPHILS # BLD AUTO: 0.02 K/UL
BASOPHILS NFR BLD: 0.6 %
BILIRUB SERPL-MCNC: 1.3 MG/DL
BUN SERPL-MCNC: 52 MG/DL
CALCIUM SERPL-MCNC: 8.3 MG/DL
CHLORIDE SERPL-SCNC: 102 MMOL/L
CO2 SERPL-SCNC: 26 MMOL/L
CREAT SERPL-MCNC: 1.1 MG/DL
DIFFERENTIAL METHOD: ABNORMAL
EOSINOPHIL # BLD AUTO: 0.1 K/UL
EOSINOPHIL NFR BLD: 1.5 %
ERYTHROCYTE [DISTWIDTH] IN BLOOD BY AUTOMATED COUNT: 18.4 %
EST. GFR  (AFRICAN AMERICAN): 59.2 ML/MIN/1.73 M^2
EST. GFR  (NON AFRICAN AMERICAN): 51.3 ML/MIN/1.73 M^2
FUNGUS SPEC CULT: NORMAL
GLUCOSE SERPL-MCNC: 199 MG/DL
HCT VFR BLD AUTO: 25.6 %
HGB BLD-MCNC: 8.2 G/DL
IMM GRANULOCYTES # BLD AUTO: 0.05 K/UL
IMM GRANULOCYTES NFR BLD AUTO: 1.5 %
LYMPHOCYTES # BLD AUTO: 0.5 K/UL
LYMPHOCYTES NFR BLD: 15.5 %
MAGNESIUM SERPL-MCNC: 1.5 MG/DL
MCH RBC QN AUTO: 30.5 PG
MCHC RBC AUTO-ENTMCNC: 32 G/DL
MCV RBC AUTO: 95 FL
MONOCYTES # BLD AUTO: 0.1 K/UL
MONOCYTES NFR BLD: 2.4 %
NEUTROPHILS # BLD AUTO: 2.6 K/UL
NEUTROPHILS NFR BLD: 78.5 %
NRBC BLD-RTO: 0 /100 WBC
PHOSPHATE SERPL-MCNC: 3.4 MG/DL
PLATELET # BLD AUTO: 98 K/UL
PMV BLD AUTO: 9.8 FL
POCT GLUCOSE: 159 MG/DL (ref 70–110)
POCT GLUCOSE: 220 MG/DL (ref 70–110)
POCT GLUCOSE: 298 MG/DL (ref 70–110)
POTASSIUM SERPL-SCNC: 3.7 MMOL/L
PROT SERPL-MCNC: 5.2 G/DL
RBC # BLD AUTO: 2.69 M/UL
SODIUM SERPL-SCNC: 137 MMOL/L
TACROLIMUS BLD-MCNC: 4.8 NG/ML
WBC # BLD AUTO: 3.3 K/UL

## 2018-12-21 PROCEDURE — 94664 DEMO&/EVAL PT USE INHALER: CPT

## 2018-12-21 PROCEDURE — 25000003 PHARM REV CODE 250: Performed by: TRANSPLANT SURGERY

## 2018-12-21 PROCEDURE — A4216 STERILE WATER/SALINE, 10 ML: HCPCS | Performed by: TRANSPLANT SURGERY

## 2018-12-21 PROCEDURE — 25000003 PHARM REV CODE 250: Performed by: NURSE PRACTITIONER

## 2018-12-21 PROCEDURE — 99900035 HC TECH TIME PER 15 MIN (STAT)

## 2018-12-21 PROCEDURE — 25000003 PHARM REV CODE 250: Performed by: PHYSICIAN ASSISTANT

## 2018-12-21 PROCEDURE — 63600175 PHARM REV CODE 636 W HCPCS: Performed by: PHYSICIAN ASSISTANT

## 2018-12-21 PROCEDURE — 63600175 PHARM REV CODE 636 W HCPCS: Performed by: TRANSPLANT SURGERY

## 2018-12-21 PROCEDURE — 99232 SBSQ HOSP IP/OBS MODERATE 35: CPT | Mod: ,,, | Performed by: NURSE PRACTITIONER

## 2018-12-21 PROCEDURE — 80197 ASSAY OF TACROLIMUS: CPT

## 2018-12-21 PROCEDURE — 83735 ASSAY OF MAGNESIUM: CPT

## 2018-12-21 PROCEDURE — 99239 HOSP IP/OBS DSCHRG MGMT >30: CPT | Mod: 24,,, | Performed by: PHYSICIAN ASSISTANT

## 2018-12-21 PROCEDURE — 80053 COMPREHEN METABOLIC PANEL: CPT

## 2018-12-21 PROCEDURE — 84100 ASSAY OF PHOSPHORUS: CPT

## 2018-12-21 PROCEDURE — 85025 COMPLETE CBC W/AUTO DIFF WBC: CPT

## 2018-12-21 RX ORDER — NAPROXEN SODIUM 220 MG/1
81 TABLET, FILM COATED ORAL DAILY
Qty: 30 TABLET | Refills: 11 | Status: SHIPPED | OUTPATIENT
Start: 2018-12-21

## 2018-12-21 RX ORDER — TACROLIMUS 1 MG/1
2 CAPSULE ORAL EVERY 12 HOURS
Qty: 120 CAPSULE | Refills: 11 | Status: SHIPPED | OUTPATIENT
Start: 2018-12-21 | End: 2018-12-31 | Stop reason: DRUGHIGH

## 2018-12-21 RX ORDER — FUROSEMIDE 20 MG/1
20 TABLET ORAL DAILY
Qty: 30 TABLET | Refills: 5 | Status: SHIPPED | OUTPATIENT
Start: 2018-12-22 | End: 2018-12-31

## 2018-12-21 RX ORDER — INSULIN ASPART 100 [IU]/ML
INJECTION, SOLUTION INTRAVENOUS; SUBCUTANEOUS
Qty: 15 ML | Refills: 1 | Status: SHIPPED | OUTPATIENT
Start: 2018-12-21 | End: 2019-01-04

## 2018-12-21 RX ORDER — MAGNESIUM SULFATE HEPTAHYDRATE 40 MG/ML
2 INJECTION, SOLUTION INTRAVENOUS ONCE
Status: COMPLETED | OUTPATIENT
Start: 2018-12-21 | End: 2018-12-21

## 2018-12-21 RX ADMIN — PANTOPRAZOLE SODIUM 40 MG: 40 TABLET, DELAYED RELEASE ORAL at 08:12

## 2018-12-21 RX ADMIN — ASPIRIN 81 MG CHEWABLE TABLET 81 MG: 81 TABLET CHEWABLE at 08:12

## 2018-12-21 RX ADMIN — INSULIN ASPART 3 UNITS: 100 INJECTION, SOLUTION INTRAVENOUS; SUBCUTANEOUS at 12:12

## 2018-12-21 RX ADMIN — Medication 10 ML: at 12:12

## 2018-12-21 RX ADMIN — HEPARIN SODIUM 5000 UNITS: 5000 INJECTION, SOLUTION INTRAVENOUS; SUBCUTANEOUS at 08:12

## 2018-12-21 RX ADMIN — Medication 10 ML: at 06:12

## 2018-12-21 RX ADMIN — METOPROLOL TARTRATE 25 MG: 25 TABLET ORAL at 08:12

## 2018-12-21 RX ADMIN — TACROLIMUS 2 MG: 1 CAPSULE ORAL at 08:12

## 2018-12-21 RX ADMIN — ATOVAQUONE 1500 MG: 750 SUSPENSION ORAL at 08:12

## 2018-12-21 RX ADMIN — INSULIN ASPART 6 UNITS: 100 INJECTION, SOLUTION INTRAVENOUS; SUBCUTANEOUS at 08:12

## 2018-12-21 RX ADMIN — INSULIN ASPART 2 UNITS: 100 INJECTION, SOLUTION INTRAVENOUS; SUBCUTANEOUS at 08:12

## 2018-12-21 RX ADMIN — MAGNESIUM SULFATE IN WATER 2 G: 40 INJECTION, SOLUTION INTRAVENOUS at 09:12

## 2018-12-21 RX ADMIN — ENTECAVIR 0.5 MG: 0.5 TABLET ORAL at 08:12

## 2018-12-21 RX ADMIN — MAGNESIUM OXIDE TAB 400 MG (241.3 MG ELEMENTAL MG) 800 MG: 400 (241.3 MG) TAB at 08:12

## 2018-12-21 RX ADMIN — INSULIN ASPART 6 UNITS: 100 INJECTION, SOLUTION INTRAVENOUS; SUBCUTANEOUS at 12:12

## 2018-12-21 RX ADMIN — VALGANCICLOVIR 450 MG: 450 TABLET, FILM COATED ORAL at 09:12

## 2018-12-21 RX ADMIN — FUROSEMIDE 20 MG: 20 TABLET ORAL at 08:12

## 2018-12-21 NOTE — PROGRESS NOTES
Discharge    (TIMBO) presented to the patient's (pt) room to discuss discharge planning for today.  Pt was observed to be sitting up right in the recliner, alert and oriented, happy affect and engaging appropriately.  Pt's  and Dunia of the International [facility] were also present during this visit.  Pt will discharge back to the [facility] being transported and under the care of her .  Pt denied any needs or concerns at this time.  Pt will discharge with ; therefore, TIMBO contacted [facility] (ext. [phone]) to advise. TIMBO inquired if pt's paid sitter, Bobbi, will continue to assist the pt's  when needed and Dunia verified through the pt's  she will continue to provide services as needed.  The pt's children are not scheduled to return to assist anytime soon.  TIMBO advised Dunia IZQUIERDO called [facility] for the wound vac to be picked up from the pt's room ([facility] [phone]; ref#[id number] spoke with Geraldine). There were no other needs or concerns addressed.  TIMBO remains available for education, resources, support and discharge planning as appropriate.

## 2018-12-21 NOTE — PROGRESS NOTES
Notified AGA PERKINS of the following:  Patient stated she is ready for discharge, but stated she started to feel weak this afternoon. BP on lower end 100/56, HR 90's - patient did received PO metoprolol this AM. Patient refusing flu vaccination stating she was told she should not get it due to her history of Hep B.   PA stated OK to discharge if patient is willing.

## 2018-12-21 NOTE — PROGRESS NOTES
Patient dcd per MD order. [facility] translated discharge instructions to patient and patient's . Patient educated on what medications were given today and when they are due tonight and tomorrow. Patient received medication education and prescriptions from transplant pharmacist. Patient aware of future lab and clinic appointments. Patient aware of what signs and symptoms require MD notification. Patient and patient's  aware of how to monitor blood sugar using glucometer and how to administer insulin injections. Patient received extra foam dressings to apply to wounds on chevron incision. Patient's AYLIN PICC dcd, Vaseline occlusive gauze dressing applied. Patient verbalized full understanding of discharge instructions, verbalized no further questions at this time. Patient's  transported patient off unit per wheelchair to [facility].

## 2018-12-21 NOTE — PROGRESS NOTES
"[facility]  Endocrinology  Progress Note    Admit Date: 10/1/2018     Reason for Consult: Management of type 2 DM, Hyperglycemia      Surgical Procedure and Date: Liver transplant 10/5/18, Ex lap 10/6/18     Diabetes diagnosis year:      Home Diabetes Medications:  Lantus 18 units HS and novolog 17 units with meals plus correction scale (150-200 +1)        Lab Results   Component Value Date     HGBA1C 6.8 (H) 2018        How often checking glucose at home? 3-4   BG readings on regimen: 120-150.  Post prandial readings as high as upper 200s  Hypoglycemia on the regimen? yes, none recently   Missed doses on regimen?  No     Diabetes Complications include:     Diabetic peripheral neuropathy      Complicating diabetes co morbidities:   CIRRHOSIS        HPI:  Patient is a 68 y.o. female with a diagnosis of ESLD, listed for liver transplant with meld 23 who underwent live transplant on 10/5/18.  Back to OR on 10/6/18 for ex lap.  Admitted 10/1/18 for hyponatremia s/p paracentesis.  Personal has known diagnosis of diabetes, endocrine consulted for diabetes management.    Post-operative course complicated by ESBL Klebsiella pneumoniae in urine (in contact isolation), peritransplant ascites, worsening renal function (required temporary dialysis), anemia (multiple blood transfusions), and possible GI bleed.     Interval HPI:   Overnight events: Remains in TSU. NAEON. Tentative discharge today. Bg at or slightly above goal overnight with scheduled insulin and correction scale coverage.   Eatin%  Nausea: No  Hypoglycemia and intervention: No  Fever: No  TPN and/or TF: No    BP (!) 140/62   Pulse 101   Temp 98.3 °F (36.8 °C)   Resp 20   Ht 5' 3" (1.6 m)   Wt 56.9 kg (125 lb 7.1 oz)   LMP  (LMP Unknown)   SpO2 95%   Breastfeeding? No   BMI 22.22 kg/m²      Labs Reviewed and Include    Recent Labs   Lab 18  0608   *   CALCIUM 8.3*   ALBUMIN 2.7*   PROT 5.2*      K " 3.7   CO2 26      BUN 52*   CREATININE 1.1   ALKPHOS 224*   ALT 29   AST 27   BILITOT 1.3*     Lab Results   Component Value Date    WBC 3.30 (L) 12/21/2018    HGB 8.2 (L) 12/21/2018    HCT 25.6 (L) 12/21/2018    MCV 95 12/21/2018    PLT 98 (L) 12/21/2018     No results for input(s): TSH, FREET4 in the last 168 hours.  Lab Results   Component Value Date    HGBA1C 6.0 (H) 10/04/2018       Nutritional status:   Body mass index is 22.22 kg/m².  Lab Results   Component Value Date    ALBUMIN 2.7 (L) 12/21/2018    ALBUMIN 2.6 (L) 12/20/2018    ALBUMIN 2.5 (L) 12/19/2018     Lab Results   Component Value Date    PREALBUMIN 13 (L) 12/10/2018    PREALBUMIN 15 (L) 11/03/2018    PREALBUMIN 12 (L) 09/17/2018       Estimated Creatinine Clearance: 39.9 mL/min (based on SCr of 1.1 mg/dL).    Accu-Checks  Recent Labs     12/19/18  0157 12/19/18  1202 12/19/18  1749 12/19/18  2142 12/20/18  0228 12/20/18  0757 12/20/18  1204 12/20/18  1745 12/20/18  2247 12/21/18  0843   POCTGLUCOSE 280* 197* 218* 157* 206* 186* 240* 252* 155* 220*       Current Medications and/or Treatments Impacting Glycemic Control  Immunotherapy:    Immunosuppressants         Stop Route Frequency     tacrolimus capsule 2 mg      -- Oral 2 times daily        Steroids:   Hormones (From admission, onward)    Start     Stop Route Frequency Ordered    10/17/18 0945  predniSONE tablet 15 mg      10/24 0859 Oral Daily 10/17/18 0943        Pressors:    Autonomic Drugs (From admission, onward)    Start     Stop Route Frequency Ordered    10/06/18 1617  rocuronium (ZEMURON) 10 mg/mL injection     Comments:  Created by cabinet override    10/07 0425   10/06/18 1617        Hyperglycemia/Diabetes Medications:   Antihyperglycemics (From admission, onward)    Start     Stop Route Frequency Ordered    12/20/18 1321  insulin aspart U-100 pen 0-5 Units      -- SubQ Before meals & nightly PRN 12/20/18 1222    12/20/18 1230  insulin aspart U-100 pen 4-6 Units      -- SubQ 3  times daily with meals 12/20/18 1222    10/07/18 1200  insulin regular (Humulin R) 100 Units in sodium chloride 0.9% 100 mL infusion      10/13 2100 IV Continuous 10/07/18 1055          ASSESSMENT and PLAN    * Liver transplanted    Managed per primary team  Avoid hypoglycemia    Recent Labs   Lab 12/21/18  0608   ALT 29   AST 27   ALKPHOS 224*   BILITOT 1.3*   PROT 5.2*   ALBUMIN 2.7*            Type 2 diabetes mellitus with diabetic polyneuropathy, with long-term current use of insulin    BG goal 140 - 180       Novolog to 4-6 units with meals; give half the scheduled insulin if BG . Hold if less than 90.   Low dose correction  BG monitoring AC/HS/0200      Discharge planning: novolog 6 units with meals plus low dose correction scale. Give half dose of insulin if BG . Hold if less than 90. Bring BG logs to endocrine clinic on Monday for adjustments and follow up with clinic.          Other ascites    Paracentesis prn per primary team.  Avoid hypoglycemia  TPN started- NPO     Prophylactic immunotherapy    May increase insulin resistance.              Aviva Caldera, NP  Endocrinology  [facility]

## 2018-12-21 NOTE — ASSESSMENT & PLAN NOTE
Managed per primary team  Avoid hypoglycemia    Recent Labs   Lab 12/21/18  0608   ALT 29   AST 27   ALKPHOS 224*   BILITOT 1.3*   PROT 5.2*   ALBUMIN 2.7*

## 2018-12-21 NOTE — PROGRESS NOTES
Met with patient and her  and , Dunia, in preparation for discharge today.  Reviewed the answers to the review questions in [facility].  Patient's  missed three questions and rationale for correct answers reviewed.  These questions cover: Post op care, med management, infection prevention and emergency contacts.  Reviewed outpatient appointments. Dunia plans to attend appointments with the patient.

## 2018-12-21 NOTE — SUBJECTIVE & OBJECTIVE
"Interval HPI:   Overnight events: Remains in TSU. NAEON. Tentative discharge today. Bg at or slightly above goal overnight with scheduled insulin and correction scale coverage.   Eatin%  Nausea: No  Hypoglycemia and intervention: No  Fever: No  TPN and/or TF: No    BP (!) 140/62   Pulse 101   Temp 98.3 °F (36.8 °C)   Resp 20   Ht 5' 3" (1.6 m)   Wt 56.9 kg (125 lb 7.1 oz)   LMP  (LMP Unknown)   SpO2 95%   Breastfeeding? No   BMI 22.22 kg/m²     Labs Reviewed and Include    Recent Labs   Lab 18  0608   *   CALCIUM 8.3*   ALBUMIN 2.7*   PROT 5.2*      K 3.7   CO2 26      BUN 52*   CREATININE 1.1   ALKPHOS 224*   ALT 29   AST 27   BILITOT 1.3*     Lab Results   Component Value Date    WBC 3.30 (L) 2018    HGB 8.2 (L) 2018    HCT 25.6 (L) 2018    MCV 95 2018    PLT 98 (L) 2018     No results for input(s): TSH, FREET4 in the last 168 hours.  Lab Results   Component Value Date    HGBA1C 6.0 (H) 10/04/2018       Nutritional status:   Body mass index is 22.22 kg/m².  Lab Results   Component Value Date    ALBUMIN 2.7 (L) 2018    ALBUMIN 2.6 (L) 2018    ALBUMIN 2.5 (L) 2018     Lab Results   Component Value Date    PREALBUMIN 13 (L) 12/10/2018    PREALBUMIN 15 (L) 2018    PREALBUMIN 12 (L) 2018       Estimated Creatinine Clearance: 39.9 mL/min (based on SCr of 1.1 mg/dL).    Accu-Checks  Recent Labs     18  0157 18  1202 18  1749 18  2142 18  0228 18  0757 18  1204 18  1745 18  2247 18  0843   POCTGLUCOSE 280* 197* 218* 157* 206* 186* 240* 252* 155* 220*       Current Medications and/or Treatments Impacting Glycemic Control  Immunotherapy:    Immunosuppressants         Stop Route Frequency     tacrolimus capsule 2 mg      -- Oral 2 times daily        Steroids:   Hormones (From admission, onward)    Start     Stop Route Frequency Ordered    10/17/18 0945  predniSONE " tablet 15 mg      10/24 0859 Oral Daily 10/17/18 0943        Pressors:    Autonomic Drugs (From admission, onward)    Start     Stop Route Frequency Ordered    10/06/18 1617  rocuronium (ZEMURON) 10 mg/mL injection     Comments:  Created by cabinet override    10/07 0429   10/06/18 1617        Hyperglycemia/Diabetes Medications:   Antihyperglycemics (From admission, onward)    Start     Stop Route Frequency Ordered    12/20/18 1321  insulin aspart U-100 pen 0-5 Units      -- SubQ Before meals & nightly PRN 12/20/18 1222    12/20/18 1230  insulin aspart U-100 pen 4-6 Units      -- SubQ 3 times daily with meals 12/20/18 1222    10/07/18 1200  insulin regular (Humulin R) 100 Units in sodium chloride 0.9% 100 mL infusion      10/13 2100 IV Continuous 10/07/18 1051

## 2018-12-21 NOTE — ASSESSMENT & PLAN NOTE
BG goal 140 - 180       Novolog to 4-6 units with meals; give half the scheduled insulin if BG . Hold if less than 90.   Low dose correction  BG monitoring /HS/0200      Discharge planning: novolog 6 units with meals plus low dose correction scale. Give half dose of insulin if BG . Hold if less than 90. Bring BG logs to endocrine clinic on Monday for adjustments and follow up with clinic.

## 2018-12-21 NOTE — PLAN OF CARE
Problem: Patient Care Overview  Goal: Plan of Care Review  Outcome: Ongoing (interventions implemented as appropriate)  Pt AAOx4, VSS ex HR, in bed with upper siderails raised x2, bed in lowest/locked position, call light/personal belongings within reach. Pt instructed to call for assistance. Pt verbalizes understanding. Pt afebrile at this time.  Proper hand hygiene performed before and after pt care.      R side Chevron incision cleansed with NS and dressed with foam 2x/wk per wound care orders. CDI.  H&H stable. LFTs and Cr stable. WBC low.  Monitoring BG ACHS. Bedtime bg 155, no SSI coverage needed.  Pt tachy HR , scheduled Lopressor continued.  Contact precautions maintained for ESBL in urine.  Ambulated jones with .  Self meds encouraged.  Possible d/c to [facility] tomorrow (12/21).   at bedside attentive to needs.     Will continue to monitor patient.

## 2018-12-21 NOTE — PROGRESS NOTES
DISCHARGE NOTE:    Scarlett Reddy is a 68 y.o. 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      Past Medical History:   Diagnosis Date    Anemia requiring transfusions 2018    Ascites     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 8/15/2018    Cirrhosis     Cough 2018    Esophageal varices     Esophageal varices without bleeding 8/15/2018    Hepatic encephalopathy     Hepatitis B     Hepatitis D virus infection     Hypersplenism     Hyponatremia     Hypotension     Liver transplant candidate     Neutropenia     Portal hypertension     Severe malnutrition 2018    Type 2 diabetes mellitus, with long-term current use of insulin 8/15/2018       Hospital Course: Ms Reddy is s/p liver transplant from 10/5/18.  Post transplant course complicated by OR takeback 10/6/18, EVA (resolved), HPylori (treated), multiple blood transfusions, HAT (angiogram ), pleural effusions, and most recenlty a chyle leak - see PA discharge note for details.  At time of discharge, MMF remains on hold for CMV treatment and low WBC, prednisone completed.  On valcyte treatment for CMV - last CMV PCR  (pending), if PCR is negative than stop valcyte treatment.  Of note - patient to get monthly HBIG for 6 months post transplant, per Dr Worrell.     Allergies: Review of patient's allergies indicates:  No Known Allergies    [facility]: [facility]    Discharge Medications:   Scarlett Reddy   Home Medication Instructions CARLOS MANUEL:[id number]    Printed on:18 1341   Medication Information                      aspirin 81 MG Chew  Chew and swallow 1 tablet (81 mg total) by mouth once daily.             atovaquone (MEPRON) 750 mg/5 mL Susp  Take 10 mLs (1,500 mg total) by mouth once daily. Stop: 4/3/19             blood sugar diagnostic (ACCU-CHEK DC) Strp  1 strip by Misc.(Non-Drug; Combo Route) route 3 (three) times daily.          "    entecavir (BARACLUDE) 0.5 MG Tab  Take 1 tablet (0.5 mg total) by mouth once daily.             ergocalciferol (VITAMIN D2) 50,000 unit Cap  Take 1 capsule (50,000 Units total) by mouth every 7 days.             furosemide (LASIX) 20 MG tablet  Take 1 tablet (20 mg total) by mouth once daily.             insulin aspart U-100 (NOVOLOG FLEXPEN U-100 INSULIN) 100 unit/mL InPn pen  Inject 6 units into the skin three times daily plus low correction scale, max TDD 30 units daily             magnesium oxide (MAG-OX) 400 mg (241.3 mg magnesium) tablet  Take 2 tablets (800 mg total) by mouth 2 (two) times daily.             metoprolol tartrate (LOPRESSOR) 25 MG tablet  Take 1 tablet (25 mg total) by mouth 2 (two) times daily.             pantoprazole (PROTONIX) 40 MG tablet  Take 1 tablet (40 mg total) by mouth 2 (two) times daily.             pen needle, diabetic (BD ULTRA-FINE CITLALI PEN NEEDLE) 32 gauge x 5/32" Ndle  To use to inject insulin 4x daily             tacrolimus (PROGRAF) 1 MG Cap  Take 2 capsules (2 mg total) by mouth every 12 (twelve) hours.             valGANciclovir (VALCYTE) 450 mg Tab  Take 1 tablet (450 mg total) by mouth 2 (two) times daily.                 Pharmacy Interventions/Recommendations:  1) Transplant Immunosuppression: tacro    2) Opportunistic Infection prophylaxis: atovaquone, valcyte,     3) Patient Counseling/Education:  Patient and caregiver educated.  Demonstrated the use of the BP cuff, thermometer.    4) Follow-Up/Discharge Needs:  HBIG monthly x6 months post transplant    5) Patient received prescriptions for:      E-rx's:  none         Printed rx's:  none      Faxed / Phoned in rx's:  none  To none pharmacy per patient request.    6) Patient Assistance Information: none    7) The following medications have been placed on HOLD and should be restarted in the outpatient setting (when appropriate): MMF (CMV, WBC)    Scarlett and her caregiver verbalized their understanding and had the " opportunity to ask questions.

## 2018-12-21 NOTE — PROGRESS NOTES
"[facility]  Endocrinology  Progress Note    Admit Date: 10/1/2018     Reason for Consult: Management of type 2 DM, Hyperglycemia      Surgical Procedure and Date: Liver transplant 10/5/18, Ex lap 10/6/18     Diabetes diagnosis year:      Home Diabetes Medications:  Lantus 18 units HS and novolog 17 units with meals plus correction scale (150-200 +1)        Lab Results   Component Value Date     HGBA1C 6.8 (H) 2018        How often checking glucose at home? 3-4   BG readings on regimen: 120-150.  Post prandial readings as high as upper 200s  Hypoglycemia on the regimen? yes, none recently   Missed doses on regimen?  No     Diabetes Complications include:     Diabetic peripheral neuropathy      Complicating diabetes co morbidities:   CIRRHOSIS        HPI:  Patient is a 68 y.o. female with a diagnosis of ESLD, listed for liver transplant with meld 23 who underwent live transplant on 10/5/18.  Back to OR on 10/6/18 for ex lap.  Admitted 10/1/18 for hyponatremia s/p paracentesis.  Personal has known diagnosis of diabetes, endocrine consulted for diabetes management.    Post-operative course complicated by ESBL Klebsiella pneumoniae in urine (in contact isolation), peritransplant ascites, worsening renal function (required temporary dialysis), anemia (multiple blood transfusions), and possible GI bleed.     Interval HPI:   Overnight events: Remains in TSU. NAEON. Paracentesis tentatively today. Tentative discharge tomorrow. BG at or slightly above goal overnight with scheduled insulin and correction scale. Insulin infusion suspended when TPN discontinued.   Eatin%  Nausea: No  Hypoglycemia and intervention: No  Fever: No  TPN and/or TF: No      /78   Pulse (!) 111   Temp 98.5 °F (36.9 °C) (Oral)   Resp 16   Ht 5' 3" (1.6 m)   Wt 56.8 kg (125 lb 3.5 oz)   LMP  (LMP Unknown)   SpO2 95%   Breastfeeding? No   BMI 22.18 kg/m²      Labs Reviewed and Include    Recent Labs "   Lab 12/20/18  0531   *   CALCIUM 8.2*   ALBUMIN 2.6*   PROT 4.8*      K 3.7   CO2 27      BUN 52*   CREATININE 1.1   ALKPHOS 206*   ALT 22   AST 24   BILITOT 1.3*     Lab Results   Component Value Date    WBC 2.84 (L) 12/20/2018    HGB 8.0 (L) 12/20/2018    HCT 25.1 (L) 12/20/2018    MCV 94 12/20/2018    PLT 95 (L) 12/20/2018     No results for input(s): TSH, FREET4 in the last 168 hours.  Lab Results   Component Value Date    HGBA1C 6.0 (H) 10/04/2018       Nutritional status:   Body mass index is 22.18 kg/m².  Lab Results   Component Value Date    ALBUMIN 2.6 (L) 12/20/2018    ALBUMIN 2.5 (L) 12/19/2018    ALBUMIN 2.7 (L) 12/18/2018     Lab Results   Component Value Date    PREALBUMIN 13 (L) 12/10/2018    PREALBUMIN 15 (L) 11/03/2018    PREALBUMIN 12 (L) 09/17/2018       Estimated Creatinine Clearance: 39.9 mL/min (based on SCr of 1.1 mg/dL).    Accu-Checks  Recent Labs     12/18/18  1803 12/18/18  2150 12/19/18  0157 12/19/18  1202 12/19/18  1749 12/19/18  2142 12/20/18  0228 12/20/18  0757 12/20/18  1204 12/20/18  1745   POCTGLUCOSE 109 347* 280* 197* 218* 157* 206* 186* 240* 252*       Current Medications and/or Treatments Impacting Glycemic Control  Immunotherapy:    Immunosuppressants         Stop Route Frequency     tacrolimus capsule 2 mg      -- Oral 2 times daily        Steroids:   Hormones (From admission, onward)    Start     Stop Route Frequency Ordered    10/17/18 0945  predniSONE tablet 15 mg      10/24 0859 Oral Daily 10/17/18 0943        Pressors:    Autonomic Drugs (From admission, onward)    Start     Stop Route Frequency Ordered    10/06/18 1617  rocuronium (ZEMURON) 10 mg/mL injection     Comments:  Created by cabinet override    10/07 0429   10/06/18 1617        Hyperglycemia/Diabetes Medications:   Antihyperglycemics (From admission, onward)    Start     Stop Route Frequency Ordered    12/20/18 1321  insulin aspart U-100 pen 0-5 Units      -- SubQ Before meals & nightly  PRN 12/20/18 1222    12/20/18 1230  insulin aspart U-100 pen 4-6 Units      -- SubQ 3 times daily with meals 12/20/18 1222    10/07/18 1200  insulin regular (Humulin R) 100 Units in sodium chloride 0.9% 100 mL infusion      10/13 2100 IV Continuous 10/07/18 1055          ASSESSMENT and PLAN    * Liver transplanted    Managed per primary team  Avoid hypoglycemia    Recent Labs   Lab 12/19/18  0530   ALT 19   AST 21   ALKPHOS 195*   BILITOT 0.9   PROT 4.8*   ALBUMIN 2.5*            Type 2 diabetes mellitus with diabetic polyneuropathy, with long-term current use of insulin    BG goal 140 - 180       Novolog to 4-6 units with meals; give half the scheduled insulin if BG . Hold if less than 90.   Low dose correction  BG monitoring AC/HS/0200      Discharge planning: TBD                Other ascites    Paracentesis prn per primary team.  Avoid hypoglycemia  TPN started- NPO     Prophylactic immunotherapy    May increase insulin resistance.                     Aviva Caldera, NP  Endocrinology  [facility]

## 2018-12-22 ENCOUNTER — LAB VISIT (OUTPATIENT)
Dept: LAB | Facility: HOSPITAL | Age: 68
End: 2018-12-22
Attending: SURGERY
Payer: COMMERCIAL

## 2018-12-22 ENCOUNTER — TELEPHONE (OUTPATIENT)
Dept: TRANSPLANT | Facility: CLINIC | Age: 68
End: 2018-12-22

## 2018-12-22 DIAGNOSIS — Z94.4 LIVER REPLACED BY TRANSPLANT: ICD-10-CM

## 2018-12-22 LAB
ALBUMIN SERPL BCP-MCNC: 2.9 G/DL
ALP SERPL-CCNC: 249 U/L
ALT SERPL W/O P-5'-P-CCNC: 30 U/L
ANION GAP SERPL CALC-SCNC: 11 MMOL/L
AST SERPL-CCNC: 28 U/L
BASOPHILS # BLD AUTO: 0.01 K/UL
BASOPHILS NFR BLD: 0.4 %
BILIRUB DIRECT SERPL-MCNC: 0.7 MG/DL
BILIRUB SERPL-MCNC: 1.3 MG/DL
BUN SERPL-MCNC: 46 MG/DL
CALCIUM SERPL-MCNC: 8.9 MG/DL
CHLORIDE SERPL-SCNC: 101 MMOL/L
CMV DNA SERPL NAA+PROBE-ACNC: NORMAL IU/ML
CO2 SERPL-SCNC: 26 MMOL/L
CREAT SERPL-MCNC: 1.1 MG/DL
DIFFERENTIAL METHOD: ABNORMAL
EOSINOPHIL # BLD AUTO: 0.1 K/UL
EOSINOPHIL NFR BLD: 1.8 %
ERYTHROCYTE [DISTWIDTH] IN BLOOD BY AUTOMATED COUNT: 18.7 %
EST. GFR  (AFRICAN AMERICAN): 59.2 ML/MIN/1.73 M^2
EST. GFR  (NON AFRICAN AMERICAN): 51.3 ML/MIN/1.73 M^2
GLUCOSE SERPL-MCNC: 244 MG/DL
HCT VFR BLD AUTO: 27.7 %
HGB BLD-MCNC: 8.7 G/DL
IMM GRANULOCYTES # BLD AUTO: 0.03 K/UL
IMM GRANULOCYTES NFR BLD AUTO: 1.1 %
LYMPHOCYTES # BLD AUTO: 0.4 K/UL
LYMPHOCYTES NFR BLD: 14.9 %
MCH RBC QN AUTO: 29.9 PG
MCHC RBC AUTO-ENTMCNC: 31.4 G/DL
MCV RBC AUTO: 95 FL
MONOCYTES # BLD AUTO: 0.1 K/UL
MONOCYTES NFR BLD: 2.5 %
NEUTROPHILS # BLD AUTO: 2.2 K/UL
NEUTROPHILS NFR BLD: 79.3 %
NRBC BLD-RTO: 0 /100 WBC
PLATELET # BLD AUTO: 108 K/UL
PMV BLD AUTO: 10 FL
POTASSIUM SERPL-SCNC: 3.9 MMOL/L
PROT SERPL-MCNC: 5.7 G/DL
RBC # BLD AUTO: 2.91 M/UL
SODIUM SERPL-SCNC: 138 MMOL/L
TACROLIMUS BLD-MCNC: 4.6 NG/ML
WBC # BLD AUTO: 2.81 K/UL

## 2018-12-22 PROCEDURE — 80197 ASSAY OF TACROLIMUS: CPT

## 2018-12-22 PROCEDURE — 82248 BILIRUBIN DIRECT: CPT

## 2018-12-22 PROCEDURE — 36415 COLL VENOUS BLD VENIPUNCTURE: CPT

## 2018-12-22 PROCEDURE — 85025 COMPLETE CBC W/AUTO DIFF WBC: CPT

## 2018-12-22 PROCEDURE — 80053 COMPREHEN METABOLIC PANEL: CPT

## 2018-12-22 NOTE — TELEPHONE ENCOUNTER
Per Fernando, , pt is feeling well at this time.  Pt reports that she did not get sleep last night and became anxious this morning.  She has resumed eating and taking her medications.  She does not want to return to hospital.  Reviewed with Dr. Adame.  Per MD, pt does not need to be admitted.  She can have labs and be seen in surgery clinic on Monday.  Pt has appointments with pharmacy and transplant coordinator.  Fernando verbalized understanding and reports that she will be present for nursing home health visit tomorrow with pt.  Instructed to call [phone] for questions or concerns.

## 2018-12-22 NOTE — TELEPHONE ENCOUNTER
Received call from home health nurse, pt is not eating, drinking, or taking medications.  Instructed to call [facility].  RN to call Dr. Adame.  Reviewed labs with Dr. Adame.  MD states he spoke to [facility] and pt needs to be admitted for failure to thrive.  Admit reservation entered.  Called Fernando,  to notify.  She will instruct pt to report to ER for admission.

## 2018-12-24 ENCOUNTER — TELEPHONE (OUTPATIENT)
Dept: TRANSPLANT | Facility: CLINIC | Age: 68
End: 2018-12-24

## 2018-12-24 ENCOUNTER — LAB VISIT (OUTPATIENT)
Dept: LAB | Facility: HOSPITAL | Age: 68
End: 2018-12-24
Attending: SURGERY
Payer: COMMERCIAL

## 2018-12-24 ENCOUNTER — CLINICAL SUPPORT (OUTPATIENT)
Dept: TRANSPLANT | Facility: CLINIC | Age: 68
End: 2018-12-24
Payer: COMMERCIAL

## 2018-12-24 VITALS
OXYGEN SATURATION: 97 % | OXYGEN SATURATION: 100 % | RESPIRATION RATE: 18 BRPM | BODY MASS INDEX: 21.56 KG/M2 | RESPIRATION RATE: 97 BRPM | HEIGHT: 65 IN | HEART RATE: 94 BPM | TEMPERATURE: 98 F | WEIGHT: 129.44 LBS | SYSTOLIC BLOOD PRESSURE: 105 MMHG | HEART RATE: 94 BPM | WEIGHT: 129.44 LBS | DIASTOLIC BLOOD PRESSURE: 60 MMHG | SYSTOLIC BLOOD PRESSURE: 105 MMHG | TEMPERATURE: 98 F | DIASTOLIC BLOOD PRESSURE: 60 MMHG | HEIGHT: 65 IN | BODY MASS INDEX: 21.56 KG/M2

## 2018-12-24 DIAGNOSIS — Z94.4 STATUS POST LIVER TRANSPLANT: Primary | ICD-10-CM

## 2018-12-24 DIAGNOSIS — E83.42 HYPOMAGNESEMIA: Primary | ICD-10-CM

## 2018-12-24 DIAGNOSIS — Z94.4 LIVER REPLACED BY TRANSPLANT: ICD-10-CM

## 2018-12-24 LAB
ALBUMIN SERPL BCP-MCNC: 2.9 G/DL
ALP SERPL-CCNC: 203 U/L
ALT SERPL W/O P-5'-P-CCNC: 22 U/L
ANION GAP SERPL CALC-SCNC: 8 MMOL/L
AST SERPL-CCNC: 20 U/L
BASOPHILS # BLD AUTO: 0.01 K/UL
BASOPHILS NFR BLD: 0.4 %
BILIRUB DIRECT SERPL-MCNC: 0.7 MG/DL
BILIRUB SERPL-MCNC: 1.3 MG/DL
BUN SERPL-MCNC: 36 MG/DL
CALCIUM SERPL-MCNC: 8.7 MG/DL
CHLORIDE SERPL-SCNC: 103 MMOL/L
CO2 SERPL-SCNC: 27 MMOL/L
CREAT SERPL-MCNC: 1.1 MG/DL
DIFFERENTIAL METHOD: ABNORMAL
EOSINOPHIL # BLD AUTO: 0 K/UL
EOSINOPHIL NFR BLD: 1.5 %
ERYTHROCYTE [DISTWIDTH] IN BLOOD BY AUTOMATED COUNT: 19.1 %
EST. GFR  (AFRICAN AMERICAN): 59.2 ML/MIN/1.73 M^2
EST. GFR  (NON AFRICAN AMERICAN): 51.3 ML/MIN/1.73 M^2
GLUCOSE SERPL-MCNC: 201 MG/DL
HCT VFR BLD AUTO: 25.8 %
HGB BLD-MCNC: 8.3 G/DL
IMM GRANULOCYTES # BLD AUTO: 0.02 K/UL
IMM GRANULOCYTES NFR BLD AUTO: 0.7 %
LYMPHOCYTES # BLD AUTO: 0.6 K/UL
LYMPHOCYTES NFR BLD: 20.9 %
MCH RBC QN AUTO: 30.3 PG
MCHC RBC AUTO-ENTMCNC: 32.2 G/DL
MCV RBC AUTO: 94 FL
MONOCYTES # BLD AUTO: 0.1 K/UL
MONOCYTES NFR BLD: 2.6 %
NEUTROPHILS # BLD AUTO: 2 K/UL
NEUTROPHILS NFR BLD: 73.9 %
NRBC BLD-RTO: 0 /100 WBC
PLATELET # BLD AUTO: 94 K/UL
PMV BLD AUTO: 9.3 FL
POTASSIUM SERPL-SCNC: 4.6 MMOL/L
PROT SERPL-MCNC: 5.6 G/DL
RBC # BLD AUTO: 2.74 M/UL
SODIUM SERPL-SCNC: 138 MMOL/L
TACROLIMUS BLD-MCNC: 4.9 NG/ML
WBC # BLD AUTO: 2.68 K/UL

## 2018-12-24 PROCEDURE — 82248 BILIRUBIN DIRECT: CPT

## 2018-12-24 PROCEDURE — 85025 COMPLETE CBC W/AUTO DIFF WBC: CPT

## 2018-12-24 PROCEDURE — 80053 COMPREHEN METABOLIC PANEL: CPT

## 2018-12-24 PROCEDURE — 99999 PR PBB SHADOW E&M-EST. PATIENT-LVL IV: CPT | Mod: PBBFAC,,,

## 2018-12-24 PROCEDURE — 36415 COLL VENOUS BLD VENIPUNCTURE: CPT

## 2018-12-24 PROCEDURE — 99999 PR PBB SHADOW E&M-EST. PATIENT-LVL III: CPT | Mod: PBBFAC,,,

## 2018-12-24 PROCEDURE — 99999 PR PBB SHADOW E&M-EST. PATIENT-LVL IV: ICD-10-PCS | Mod: PBBFAC,,,

## 2018-12-24 PROCEDURE — 80197 ASSAY OF TACROLIMUS: CPT

## 2018-12-24 RX ORDER — HEPARIN SODIUM (PORCINE) LOCK FLUSH IV SOLN 100 UNIT/ML 100 UNIT/ML
100 SOLUTION INTRAVENOUS
Status: CANCELLED | OUTPATIENT
Start: 2019-01-20

## 2018-12-24 RX ORDER — SODIUM CHLORIDE 0.9 % (FLUSH) 0.9 %
10 SYRINGE (ML) INJECTION
Status: CANCELLED | OUTPATIENT
Start: 2019-01-20

## 2018-12-24 RX ORDER — ADHESIVE BANDAGE
5 BANDAGE TOPICAL 3 TIMES DAILY
COMMUNITY
Start: 2018-12-24

## 2018-12-24 NOTE — TELEPHONE ENCOUNTER
----- Message from Gigi Sprague MD sent at 12/24/2018 11:08 AM CST -----  Results ok. No action needed

## 2018-12-24 NOTE — TELEPHONE ENCOUNTER
----- Message from Mallory Stevenson sent at 12/24/2018 11:53 AM CST -----  Contact: Joseph contreras/[facility] [phone]  Calling to get clarification on if they are to do any wound care for the pt's incision    Contact Joseph with information

## 2018-12-24 NOTE — PHYSICIAN QUERY
PT Name: Scarlett Reddy  MR #: [id number]    Physician Query Form - Respiratory Condition Clarification      CDS/: Patsy Sanz               Contact information: [email or web address]    This form is a permanent document in the medical record.    Query Date: December 24, 2018    By submitting this query, we are merely seeking further clarification of documentation. Please utilize your independent clinical judgment when addressing the question(s) below.    The Medical record contains the following   Indicators   Supporting Clinical Findings Location in Medical Record      SOB, CORRALES, Wheezing, Productive Cough, Use of Accessory Muscles, etc.     x   Acute/Chronic Illness 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  H&P 10-1      Radiology Findings     x   Respiratory Distress or Failure  Hypercapneic hypoxic respiratory failure Critical care surgery pn 10-7      Hypoxia or Hypercapnia     x   RR         ABGs         O2 sat ABG 7.19/64.7/49   Critical care surgery event note 10-6   x   BiPAP/Intubation Intubated 10/06 and extubated 10/07   Nephrology cn 10-11      Supplemental O2        Home O2, Oxygen Dependence        Treatment     x   Other She was extubated and taken to the floor, where she was experiencing hypercapneic hypoxic respiratory failure and reintubated. She was later successfully extubated and is now saturating well on room air.    Critical care surgery pn 10-7     Respiratory failure can be acute, chronic or both. It is generally further specified as hypoxic, hypercapnic or both. Lastly, it is important to identify an etiology, if known or suspected.   References:: https://www.acphospitalist.org/archives/2013/10/coding; htm; http://[employer]/acute-respiratory-failure-know    The clinical guidelines noted below are only system guidelines, and do not replace the providers clinical judgment.    Provider, please specify diagnosis or diagnoses associated with above  clinical findings.   [ x  ] Acute Respiratory Failure with Hypoxia and Hypercapnia - Hypoxia: ABG pO2 < 60 mmHg or O2 sat of 88% on RA and Hypercapnia: pCO2 > 50 mmHg with pH < 7.35 and respiratory symptoms documented   [   ] No Respiratory Failure, Maintained on Vent for Routine Care or Airway Protection -  purposely intubated for airway protection (e.g.: angioedema, stroke, trauma); without meeting the criteria for respiratory failure.    [   ] Other Respiratory Diagnosis (please specify): _________________________________   [   ]  Clinically Undetermined       Please document in your progress notes daily for the duration of treatment until resolved and include in your discharge summary.

## 2018-12-24 NOTE — PROGRESS NOTES
Clinic Note: First Return to Clinic Post-   Liver Transplant    Scarlett Reddy  is a 68 y.o. female  S/p   LIVER transplant on 10/5/2018 (Liver) for Cirrhosis: Type B and D.      Discharge Course (Issues/Concerns): Ms Reddy is s/p liver transplant from 10/5/18.  Post transplant course complicated by OR takeback 10/6/18, EVA (resolved), HPylori (treated), multiple blood transfusions, HAT (angiogram 11/19), pleural effusions, and  a chyle leak..  At time of discharge, MMF remains on hold for CMV treatment and low WBC, prednisone completed.         Objective:   There were no vitals filed for this visit.    Met with patient and her caregiver in the clinic to review current medication list.     Current Outpatient Medications   Medication Sig Dispense Refill    aspirin 81 MG Chew Chew and swallow 1 tablet (81 mg total) by mouth once daily. 30 tablet 11    atovaquone (MEPRON) 750 mg/5 mL Susp Take 10 mLs (1,500 mg total) by mouth once daily. Stop: 4/3/19 300 mL 3    blood sugar diagnostic (ACCU-CHEK DC) Strp 1 strip by Misc.(Non-Drug; Combo Route) route 3 (three) times daily.      entecavir (BARACLUDE) 0.5 MG Tab Take 1 tablet (0.5 mg total) by mouth once daily. 30 tablet 3    ergocalciferol (VITAMIN D2) 50,000 unit Cap Take 1 capsule (50,000 Units total) by mouth every 7 days. 4 capsule 5    furosemide (LASIX) 20 MG tablet Take 1 tablet (20 mg total) by mouth once daily. 30 tablet 5    insulin aspart U-100 (NOVOLOG FLEXPEN U-100 INSULIN) 100 unit/mL InPn pen Inject 6 units into the skin three times daily plus low correction scale, max TDD 30 units daily 15 mL 1    magnesium oxide (MAG-OX) 400 mg (241.3 mg magnesium) tablet Take 2 tablets (800 mg total) by mouth 2 (two) times daily. 120 tablet 3    metoprolol tartrate (LOPRESSOR) 25 MG tablet Take 1 tablet (25 mg total) by mouth 2 (two) times daily. 60 tablet 5    pantoprazole (PROTONIX) 40 MG tablet Take 1 tablet (40 mg total) by mouth 2 (two) times daily. 60  "tablet 5    pen needle, diabetic (BD ULTRA-FINE CITLALI PEN NEEDLE) 32 gauge x 5/32" Ndle To use to inject insulin 4x daily 100 each 3    tacrolimus (PROGRAF) 1 MG Cap Take 2 capsules (2 mg total) by mouth every 12 (twelve) hours. 120 capsule 11    valGANciclovir (VALCYTE) 450 mg Tab Take 1 tablet (450 mg total) by mouth 2 (two) times daily. 60 tablet 2     No current facility-administered medications for this visit.        Pharmacy Interventions/Recommendations:     1) Graft Function & Immunosuppression Issues:  Cellcept on hold for low WBC count (and treatment for CMV, now negative)    2) Opportunistic Infection prophylaxis:   PCP ppx: Mepron until 4/3/2019 (leukopenia)  CMV ppx: was on Valcyte for TREATMENT of CMV, which is now negative.  Discussed with Dr Theodore.  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  Per Dr Worrell, please administer HBIG 10,000 units q month through 6 months post-transplant;  After 6 months until 12 months, will needs HBIG administered Q Month if HBsAb are < 200 IU/ml, months 13-24 will need HBIG administered Q Month if HBsAb are < 100 IU/ml;     CMV: CMV PCR q week x 2    Donor CMV Status: Positive  Donor HCV Status: Negative  Donor HBcAb: Negative  Donor HBV SETH: Negative  Donor HCV SETH: Negative      4) Pain Management & Bowel Regimen: n/a    5) Blood Pressure Management: BP low, discussed with Dr Theodore, will stop Lopressor    6) Blood Sugar Management & Follow-up: has a follow-up appt with Endocrinology on 1/4/2019, asked her to bring BG logs with each clinic visit    7) Electrolyte Management: all WNL    8) OTHER medication follow-up (patient assistance, held medications, etc): Cellcept held for leukopenia; was having issues swallowing " Mag Ox, changed to liquid magnesium hydroxide 400 mg (5 ml ) po TID, please check Mg level with labs; Was nervous about taking aspirin because she heard this was not safe, reinforced importance of anti-platelet for HAT prevention, as well as, MI prophylaxis in a patient with DM; Consider statin at future appt.    9) Reinforced medication education conducted in the hospital, including medication indications, dosing, administration, side effects, monitoring-- including timing of immunosuppressant levels.     Patient received their FIRST fill of medications from [facility].  Discussed the process for obtaining refills of medications, including verifying the dose and mailing address to have medications delivered.     Scarlett and her caregivers verbalized understanding and had the opportunity to ask questions.

## 2018-12-24 NOTE — TELEPHONE ENCOUNTER
Labs reviewed will repeat on Monday per Dr. Layne given to [facility] for daily dressing change  Pt notified via

## 2018-12-26 ENCOUNTER — LAB VISIT (OUTPATIENT)
Dept: LAB | Facility: HOSPITAL | Age: 68
End: 2018-12-26
Attending: SURGERY
Payer: COMMERCIAL

## 2018-12-26 ENCOUNTER — TELEPHONE (OUTPATIENT)
Dept: TRANSPLANT | Facility: CLINIC | Age: 68
End: 2018-12-26

## 2018-12-26 DIAGNOSIS — Z94.4 STATUS POST LIVER TRANSPLANT: ICD-10-CM

## 2018-12-26 LAB
ALBUMIN SERPL BCP-MCNC: 2.8 G/DL
ALP SERPL-CCNC: 172 U/L
ALT SERPL W/O P-5'-P-CCNC: 18 U/L
ANION GAP SERPL CALC-SCNC: 8 MMOL/L
AST SERPL-CCNC: 18 U/L
BASOPHILS # BLD AUTO: 0.01 K/UL
BASOPHILS NFR BLD: 0.3 %
BILIRUB DIRECT SERPL-MCNC: 0.7 MG/DL
BILIRUB SERPL-MCNC: 1.3 MG/DL
BUN SERPL-MCNC: 30 MG/DL
CALCIUM SERPL-MCNC: 8.8 MG/DL
CHLORIDE SERPL-SCNC: 104 MMOL/L
CO2 SERPL-SCNC: 25 MMOL/L
CREAT SERPL-MCNC: 1 MG/DL
DIFFERENTIAL METHOD: ABNORMAL
EOSINOPHIL # BLD AUTO: 0 K/UL
EOSINOPHIL NFR BLD: 0.3 %
ERYTHROCYTE [DISTWIDTH] IN BLOOD BY AUTOMATED COUNT: 18.9 %
EST. GFR  (AFRICAN AMERICAN): >60 ML/MIN/1.73 M^2
EST. GFR  (NON AFRICAN AMERICAN): 57.6 ML/MIN/1.73 M^2
FUNGUS SPEC CULT: NORMAL
GLUCOSE SERPL-MCNC: 242 MG/DL
HCT VFR BLD AUTO: 26.4 %
HGB BLD-MCNC: 8.4 G/DL
IMM GRANULOCYTES # BLD AUTO: 0.02 K/UL
IMM GRANULOCYTES NFR BLD AUTO: 0.7 %
LYMPHOCYTES # BLD AUTO: 0.5 K/UL
LYMPHOCYTES NFR BLD: 17.9 %
MCH RBC QN AUTO: 30.2 PG
MCHC RBC AUTO-ENTMCNC: 31.8 G/DL
MCV RBC AUTO: 95 FL
MONOCYTES # BLD AUTO: 0.1 K/UL
MONOCYTES NFR BLD: 3.4 %
NEUTROPHILS # BLD AUTO: 2.2 K/UL
NEUTROPHILS NFR BLD: 77.4 %
NRBC BLD-RTO: 0 /100 WBC
PLATELET # BLD AUTO: 105 K/UL
PMV BLD AUTO: 9.6 FL
POTASSIUM SERPL-SCNC: 4.5 MMOL/L
PROT SERPL-MCNC: 5.5 G/DL
RBC # BLD AUTO: 2.78 M/UL
SODIUM SERPL-SCNC: 137 MMOL/L
TACROLIMUS BLD-MCNC: 4 NG/ML
WBC # BLD AUTO: 2.9 K/UL

## 2018-12-26 PROCEDURE — 80053 COMPREHEN METABOLIC PANEL: CPT

## 2018-12-26 PROCEDURE — 82248 BILIRUBIN DIRECT: CPT

## 2018-12-26 PROCEDURE — 36415 COLL VENOUS BLD VENIPUNCTURE: CPT

## 2018-12-26 PROCEDURE — 85025 COMPLETE CBC W/AUTO DIFF WBC: CPT

## 2018-12-26 PROCEDURE — 80197 ASSAY OF TACROLIMUS: CPT

## 2018-12-26 NOTE — TELEPHONE ENCOUNTER
----- Message from Gigi Sprague MD sent at 12/26/2018  1:48 PM CST -----  Results ok. No action needed

## 2018-12-26 NOTE — TELEPHONE ENCOUNTER
Labs reviewed will repeat  labs on Monday 12/31/18 message relayed via ( [facility])  Pt will be seen in clinic on 12/28/18

## 2018-12-27 ENCOUNTER — TELEPHONE (OUTPATIENT)
Dept: TRANSPLANT | Facility: CLINIC | Age: 68
End: 2018-12-27

## 2018-12-27 RX ORDER — METOPROLOL TARTRATE 25 MG/1
25 TABLET, FILM COATED ORAL 2 TIMES DAILY
Qty: 60 TABLET | Refills: 11 | Status: SHIPPED | OUTPATIENT
Start: 2018-12-27 | End: 2018-12-31

## 2018-12-27 NOTE — TELEPHONE ENCOUNTER
Left VM for HH nurse will restart lopressor 25 mg bid per Dr. Sprague  Also sent message via ([employer]) to have pt restart lopressor

## 2018-12-27 NOTE — TELEPHONE ENCOUNTER
Pt  reports pt heart rate was 125 this am  Lopressor was stopped in clinic due to low heart rate  Advised to continue to monitor. Will discuss with surgeon today  Pt will be seen in clinic tomorrrow

## 2018-12-27 NOTE — TELEPHONE ENCOUNTER
----- Message from Toña Vargas sent at 12/27/2018 11:21 AM CST -----  Contact: Dagmar GOULD Nurse   Needs Advice    Reason for call: Heart rate is 124 at rest.         Communication Preference: [phone]    Additional Information: n/a

## 2018-12-28 ENCOUNTER — HOSPITAL ENCOUNTER (OUTPATIENT)
Dept: RADIOLOGY | Facility: HOSPITAL | Age: 68
Discharge: HOME | End: 2018-12-28
Attending: SURGERY
Payer: COMMERCIAL

## 2018-12-28 ENCOUNTER — TELEPHONE (OUTPATIENT)
Dept: TRANSPLANT | Facility: CLINIC | Age: 68
End: 2018-12-28

## 2018-12-28 DIAGNOSIS — Z94.4 STATUS POST LIVER TRANSPLANT: ICD-10-CM

## 2018-12-28 PROCEDURE — 76705 ECHO EXAM OF ABDOMEN: CPT | Mod: TC

## 2018-12-28 PROCEDURE — 76705 ECHO EXAM OF ABDOMEN: CPT | Mod: 26,59,, | Performed by: RADIOLOGY

## 2018-12-28 PROCEDURE — 93975 VASCULAR STUDY: CPT | Mod: TC

## 2018-12-28 PROCEDURE — 93975 VASCULAR STUDY: CPT | Mod: 26,,, | Performed by: RADIOLOGY

## 2018-12-28 NOTE — TELEPHONE ENCOUNTER
----- Message from Gigi Sprague MD sent at 12/28/2018 10:54 AM CST -----  Results ok. No action needed

## 2018-12-28 NOTE — TELEPHONE ENCOUNTER
US stable no action needed at this time. Message relayed via .   reports pt was not feeling well was having abdominal pain.  Pt had US this am. Was advised to take pain meds and see if this helps if no relief by the end of the day advised [facility] pt may need to go to Er for evaluation

## 2018-12-31 ENCOUNTER — OFFICE VISIT (OUTPATIENT)
Dept: TRANSPLANT | Facility: CLINIC | Age: 68
End: 2018-12-31
Payer: COMMERCIAL

## 2018-12-31 ENCOUNTER — TELEPHONE (OUTPATIENT)
Dept: TRANSPLANT | Facility: CLINIC | Age: 68
End: 2018-12-31

## 2018-12-31 ENCOUNTER — LAB VISIT (OUTPATIENT)
Dept: LAB | Facility: HOSPITAL | Age: 68
End: 2018-12-31
Attending: SURGERY
Payer: COMMERCIAL

## 2018-12-31 VITALS
SYSTOLIC BLOOD PRESSURE: 118 MMHG | DIASTOLIC BLOOD PRESSURE: 58 MMHG | WEIGHT: 131.63 LBS | OXYGEN SATURATION: 99 % | HEART RATE: 119 BPM | BODY MASS INDEX: 21.93 KG/M2 | TEMPERATURE: 98 F | RESPIRATION RATE: 17 BRPM

## 2018-12-31 DIAGNOSIS — Z94.4 STATUS POST LIVER TRANSPLANT: ICD-10-CM

## 2018-12-31 DIAGNOSIS — Z51.81 ENCOUNTER FOR THERAPEUTIC DRUG MONITORING: Primary | ICD-10-CM

## 2018-12-31 DIAGNOSIS — Z94.4 LIVER REPLACED BY TRANSPLANT: ICD-10-CM

## 2018-12-31 LAB
ALBUMIN SERPL BCP-MCNC: 2.8 G/DL
ALP SERPL-CCNC: 141 U/L
ALT SERPL W/O P-5'-P-CCNC: 14 U/L
ANION GAP SERPL CALC-SCNC: 10 MMOL/L
AST SERPL-CCNC: 16 U/L
BASOPHILS # BLD AUTO: 0.01 K/UL
BASOPHILS NFR BLD: 0.4 %
BILIRUB DIRECT SERPL-MCNC: 0.6 MG/DL
BILIRUB SERPL-MCNC: 1.1 MG/DL
BUN SERPL-MCNC: 23 MG/DL
CALCIUM SERPL-MCNC: 8.8 MG/DL
CHLORIDE SERPL-SCNC: 105 MMOL/L
CO2 SERPL-SCNC: 23 MMOL/L
CREAT SERPL-MCNC: 1.1 MG/DL
DIFFERENTIAL METHOD: ABNORMAL
EOSINOPHIL # BLD AUTO: 0 K/UL
EOSINOPHIL NFR BLD: 0.4 %
ERYTHROCYTE [DISTWIDTH] IN BLOOD BY AUTOMATED COUNT: 18.1 %
EST. GFR  (AFRICAN AMERICAN): 59.2 ML/MIN/1.73 M^2
EST. GFR  (NON AFRICAN AMERICAN): 51.3 ML/MIN/1.73 M^2
GLUCOSE SERPL-MCNC: 232 MG/DL
HCT VFR BLD AUTO: 24.5 %
HGB BLD-MCNC: 8.1 G/DL
IMM GRANULOCYTES # BLD AUTO: 0.04 K/UL
IMM GRANULOCYTES NFR BLD AUTO: 1.5 %
LYMPHOCYTES # BLD AUTO: 0.5 K/UL
LYMPHOCYTES NFR BLD: 19.8 %
MCH RBC QN AUTO: 31.3 PG
MCHC RBC AUTO-ENTMCNC: 33.1 G/DL
MCV RBC AUTO: 95 FL
MONOCYTES # BLD AUTO: 0.4 K/UL
MONOCYTES NFR BLD: 13.7 %
NEUTROPHILS # BLD AUTO: 1.7 K/UL
NEUTROPHILS NFR BLD: 64.2 %
NRBC BLD-RTO: 0 /100 WBC
PLATELET # BLD AUTO: 105 K/UL
PMV BLD AUTO: 9.5 FL
POTASSIUM SERPL-SCNC: 4.2 MMOL/L
PROT SERPL-MCNC: 5.5 G/DL
RBC # BLD AUTO: 2.59 M/UL
SODIUM SERPL-SCNC: 138 MMOL/L
TACROLIMUS BLD-MCNC: 3.5 NG/ML
WBC # BLD AUTO: 2.63 K/UL

## 2018-12-31 PROCEDURE — 85025 COMPLETE CBC W/AUTO DIFF WBC: CPT

## 2018-12-31 PROCEDURE — 36415 COLL VENOUS BLD VENIPUNCTURE: CPT

## 2018-12-31 PROCEDURE — 82248 BILIRUBIN DIRECT: CPT

## 2018-12-31 PROCEDURE — 99215 OFFICE O/P EST HI 40 MIN: CPT | Mod: S$GLB,,, | Performed by: SURGERY

## 2018-12-31 PROCEDURE — 99999 PR PBB SHADOW E&M-EST. PATIENT-LVL III: CPT | Mod: PBBFAC,,,

## 2018-12-31 PROCEDURE — 80197 ASSAY OF TACROLIMUS: CPT

## 2018-12-31 PROCEDURE — 80053 COMPREHEN METABOLIC PANEL: CPT

## 2018-12-31 RX ORDER — METOPROLOL TARTRATE 25 MG/1
50 TABLET, FILM COATED ORAL 2 TIMES DAILY
Qty: 120 TABLET | Refills: 11 | Status: SHIPPED | OUTPATIENT
Start: 2018-12-31 | End: 2019-12-31

## 2018-12-31 RX ORDER — TACROLIMUS 1 MG/1
3 CAPSULE ORAL EVERY 12 HOURS
Qty: 180 CAPSULE | Refills: 11 | Status: SHIPPED | OUTPATIENT
Start: 2018-12-31 | End: 2019-01-28 | Stop reason: DRUGHIGH

## 2018-12-31 RX ORDER — FUROSEMIDE 20 MG/1
40 TABLET ORAL DAILY
Qty: 30 TABLET | Refills: 5 | Status: SHIPPED | OUTPATIENT
Start: 2018-12-31 | End: 2019-01-22 | Stop reason: SDUPTHER

## 2018-12-31 NOTE — PROGRESS NOTES
Transplant Surgery  Liver Transplant Recipient Follow-up    Original Referring Physician: Gigi Sprague  Current Corresponding Physician:     Chief Complaint: Scarlett is here for follow up of her liver transplant performed 10/5/2018 for the primary diagnosis (UNOS) of Cirrhosis: Type B and D    ORGAN: LIVER  Whole or Partial: whole liver  Donor Type:  - brain death  PHS Increased Risk: no  Donor CMV Status: Positive  Donor HCV Status: Negative  Donor HBcAb: Negative  Donor HBV SETH: Negative  Donor HCV SETH: Negative    Biliary Anastomosis: end to end  Arterial Anatomy: replaced left hepatic and right hepatic  IVC reconstruction: end to end ivc  Portal vein status: partial patent    Subjective:     History of Present Illness: She has had the following complications since transplant: Multiple.  The noted complications are well controlled.    Interval History: Currently, she is doing well.  Current complaints include edema.  Scarlett is here for management of her immunosuppression medication.    Review of Systems   Constitutional: Negative.  Negative for activity change, appetite change, chills, diaphoresis, fatigue, fever and unexpected weight change.   Respiratory: Negative for cough, choking, chest tightness and shortness of breath.    Cardiovascular: Negative for chest pain.   Gastrointestinal: Negative for abdominal distention, constipation, diarrhea, nausea and vomiting.   Musculoskeletal: Negative for arthralgias.   Skin: Negative for color change and rash.   Neurological: Negative for dizziness and headaches.   Psychiatric/Behavioral: Negative for confusion.   All other systems reviewed and are negative.      Objective:     Physical Exam   Constitutional: She is oriented to person, place, and time. She appears well-developed and well-nourished. No distress.   HENT:   Mouth/Throat: No oropharyngeal exudate.   Eyes: Conjunctivae are normal. Pupils are equal, round, and reactive to light. No scleral icterus.    Neck: Neck supple. No thyroid mass and no thyromegaly present.   Cardiovascular: Normal rate, normal heart sounds and intact distal pulses.   Pulmonary/Chest: Effort normal and breath sounds normal. No respiratory distress. She has no wheezes. She has no rales.   Abdominal: Soft. She exhibits no distension and no mass. There is no hepatosplenomegaly. There is no tenderness. There is no rebound and no guarding. No hernia.       Musculoskeletal: She exhibits no edema.   Lymphadenopathy:        Head (right side): No submandibular adenopathy present.        Head (left side): No submandibular adenopathy present.     She has no cervical adenopathy.        Right: No inguinal and no supraclavicular adenopathy present.        Left: No inguinal and no supraclavicular adenopathy present.   Neurological: She is alert and oriented to person, place, and time.   Skin: Skin is dry and intact. No rash noted. She is not diaphoretic.   Psychiatric: She has a normal mood and affect.   Nursing note and vitals reviewed.    Lab Results   Component Value Date    BILITOT 1.1 (H) 12/31/2018    AST 16 12/31/2018    ALT 14 12/31/2018    ALKPHOS 141 (H) 12/31/2018    CREATININE 1.1 12/31/2018    ALBUMIN 2.8 (L) 12/31/2018     Lab Results   Component Value Date    WBC 2.63 (L) 12/31/2018    HGB 8.1 (L) 12/31/2018    HCT 24.5 (L) 12/31/2018    HCT 21 (L) 10/06/2018     (L) 12/31/2018     Lab Results   Component Value Date    TACROLIMUS 4.0 (L) 12/26/2018       Assessment/Plan:          · S/P liver transplant.  · Chronic immunosuppressive medications for rejection prophylaxis at target.  Plan: no adjustment needed.  · Continue monitoring symptoms, labs and drug levels for drug-related toxicity and side effects.  · Incision: staples out, wound well-healed, no evidence of hernia  · Femoral arterial line site: no complications evident   · HAT: recent U/S shows signals in both lobes  · Edema: will lasix to 40 daily  · Hypertension: increase  metoprolol to 50 bid     Gigi Sprague MD       [facility] Patient Status  Functional Status: 50% - Requires considerable assistance and frequent medical care  Physical Capacity: No Limitations

## 2018-12-31 NOTE — TELEPHONE ENCOUNTER
----- Message from Gigi Sprague MD sent at 12/31/2018 11:26 AM CST -----  Results ok. No action needed

## 2018-12-31 NOTE — TELEPHONE ENCOUNTER
Pt notified via ([facility]) to increase prograf to 3 mg bid from 2 mg bid and repeat labs on Thursday 1/3/19

## 2019-01-02 DIAGNOSIS — Z79.4 TYPE 2 DIABETES MELLITUS WITH DIABETIC POLYNEUROPATHY, WITH LONG-TERM CURRENT USE OF INSULIN: ICD-10-CM

## 2019-01-02 DIAGNOSIS — E11.42 TYPE 2 DIABETES MELLITUS WITH DIABETIC POLYNEUROPATHY, WITH LONG-TERM CURRENT USE OF INSULIN: ICD-10-CM

## 2019-01-02 RX ORDER — PEN NEEDLE, DIABETIC 30 GX3/16"
NEEDLE, DISPOSABLE MISCELLANEOUS
Qty: 100 EACH | Refills: 3 | Status: SHIPPED | OUTPATIENT
Start: 2019-01-02

## 2019-01-03 ENCOUNTER — TELEPHONE (OUTPATIENT)
Dept: TRANSPLANT | Facility: CLINIC | Age: 69
End: 2019-01-03

## 2019-01-03 ENCOUNTER — LAB VISIT (OUTPATIENT)
Dept: LAB | Facility: HOSPITAL | Age: 69
End: 2019-01-03
Attending: SURGERY
Payer: COMMERCIAL

## 2019-01-03 DIAGNOSIS — D64.9 ANEMIA, UNSPECIFIED TYPE: ICD-10-CM

## 2019-01-03 DIAGNOSIS — Z94.4 STATUS POST LIVER TRANSPLANT: ICD-10-CM

## 2019-01-03 DIAGNOSIS — Z94.4 STATUS POST LIVER TRANSPLANT: Primary | ICD-10-CM

## 2019-01-03 LAB
ALBUMIN SERPL BCP-MCNC: 2.7 G/DL
ALP SERPL-CCNC: 130 U/L
ALT SERPL W/O P-5'-P-CCNC: 15 U/L
ANION GAP SERPL CALC-SCNC: 8 MMOL/L
AST SERPL-CCNC: 17 U/L
BASOPHILS # BLD AUTO: 0.02 K/UL
BASOPHILS NFR BLD: 0.7 %
BILIRUB DIRECT SERPL-MCNC: 0.6 MG/DL
BILIRUB SERPL-MCNC: 1.2 MG/DL
BUN SERPL-MCNC: 28 MG/DL
CALCIUM SERPL-MCNC: 8.6 MG/DL
CHLORIDE SERPL-SCNC: 102 MMOL/L
CO2 SERPL-SCNC: 27 MMOL/L
CREAT SERPL-MCNC: 1.1 MG/DL
DIFFERENTIAL METHOD: ABNORMAL
EOSINOPHIL # BLD AUTO: 0 K/UL
EOSINOPHIL NFR BLD: 0.3 %
ERYTHROCYTE [DISTWIDTH] IN BLOOD BY AUTOMATED COUNT: 17 %
EST. GFR  (AFRICAN AMERICAN): 59.2 ML/MIN/1.73 M^2
EST. GFR  (NON AFRICAN AMERICAN): 51.3 ML/MIN/1.73 M^2
GLUCOSE SERPL-MCNC: 226 MG/DL
HCT VFR BLD AUTO: 24.5 %
HGB BLD-MCNC: 7.7 G/DL
IMM GRANULOCYTES # BLD AUTO: 0.05 K/UL
IMM GRANULOCYTES NFR BLD AUTO: 1.6 %
LYMPHOCYTES # BLD AUTO: 0.7 K/UL
LYMPHOCYTES NFR BLD: 23 %
MCH RBC QN AUTO: 30.3 PG
MCHC RBC AUTO-ENTMCNC: 31.4 G/DL
MCV RBC AUTO: 97 FL
MONOCYTES # BLD AUTO: 0.7 K/UL
MONOCYTES NFR BLD: 22.6 %
NEUTROPHILS # BLD AUTO: 1.6 K/UL
NEUTROPHILS NFR BLD: 51.8 %
NRBC BLD-RTO: 0 /100 WBC
PLATELET # BLD AUTO: 111 K/UL
PMV BLD AUTO: 9.7 FL
POTASSIUM SERPL-SCNC: 3.9 MMOL/L
PROT SERPL-MCNC: 5.3 G/DL
RBC # BLD AUTO: 2.54 M/UL
SODIUM SERPL-SCNC: 137 MMOL/L
TACROLIMUS BLD-MCNC: 4.5 NG/ML
WBC # BLD AUTO: 3.05 K/UL

## 2019-01-03 PROCEDURE — 36415 COLL VENOUS BLD VENIPUNCTURE: CPT

## 2019-01-03 PROCEDURE — 82248 BILIRUBIN DIRECT: CPT

## 2019-01-03 PROCEDURE — 85025 COMPLETE CBC W/AUTO DIFF WBC: CPT

## 2019-01-03 PROCEDURE — 80053 COMPREHEN METABOLIC PANEL: CPT

## 2019-01-03 PROCEDURE — 80197 ASSAY OF TACROLIMUS: CPT

## 2019-01-03 NOTE — PROGRESS NOTES
CHIEF COMPLAINT: Type 2 Diabetes     HPI:      Patient is a 68 y.o. female with a diagnosis of ESLD, listed for liver transplant with meld 23 who underwent live transplant on 10/5/18.  Back to OR on 10/6/18 for ex lap.  Admitted 10/1/18 for hyponatremia s/p paracentesis.  Personal has known diagnosis of diabetes, endocrine consulted for diabetes management.     Post-operative course complicated by ESBL Klebsiella pneumoniae in urine (in contact isolation), peritransplant ascites, worsening renal function (required temporary dialysis), anemia (multiple blood transfusions), and possible GI bleed.    Diagnosed with t2dm x 6 years ago.    Pt is currently off prednisone.    Pt speaks Kiswahili, needs .    Hospitalized x 4 mos.   and family member present during visit.    Lab Results   Component Value Date    HGBA1C 6.0 (H) 10/04/2018     PREVIOUS DIABETES MEDICATIONS TRIED  novolog  metformin    CURRENT DIABETIC MEDS: novolog 6 units w/ meals plus scale 180-230+1 etc    Pt is monitoring blood glucose readings 4 times a day.  Needs ~100 strips per month related to fluctuations with blood glucose reading, a1c trends, and activity level.    Last Podiatry Exam: none    REVIEW OF SYSTEMS  General: no weakness, fatigue, + weight changes.   Eyes: no double or blurred vision, eye pain, or redness; Last Eye Exam=> 6mos .   Cardiovascular: no chest pain, palpitations, + edema, or murmurs.   Respiratory: no cough or dyspnea.   GI: no heartburn, nausea, or changes in bowel patterns; good appetite.   Skin: no rashes, dryness, itching, or reactions at insulin injection sites.  Neuro: + numbness, tingling, tremors, or vertigo.   Endocrine: no polyuria, polydipsia, polyphagia, heat or cold intolerance.     Vital Signs  LMP  (LMP Unknown)     Hemoglobin A1C   Date Value Ref Range Status   10/04/2018 6.0 (H) 4.0 - 5.6 % Final     Comment:     ADA Screening Guidelines:  5.7-6.4%  Consistent with prediabetes  >or=6.5%   Consistent with diabetes  High levels of fetal hemoglobin interfere with the HbA1C  assay. Heterozygous hemoglobin variants (HbS, HgC, etc)do  not significantly interfere with this assay.   However, presence of multiple variants may affect accuracy.     08/16/2018 6.8 (H) 4.0 - 5.6 % Final     Comment:     ADA Screening Guidelines:  5.7-6.4%  Consistent with prediabetes  >or=6.5%  Consistent with diabetes  High levels of fetal hemoglobin interfere with the HbA1C  assay. Heterozygous hemoglobin variants (HbS, HgC, etc)do  not significantly interfere with this assay.   However, presence of multiple variants may affect accuracy.          Chemistry        Component Value Date/Time     01/03/2019 0817    K 3.9 01/03/2019 0817     01/03/2019 0817    CO2 27 01/03/2019 0817    BUN 28 (H) 01/03/2019 0817    CREATININE 1.1 01/03/2019 0817     (H) 01/03/2019 0817        Component Value Date/Time    CALCIUM 8.6 (L) 01/03/2019 0817    ALKPHOS 130 01/03/2019 0817    AST 17 01/03/2019 0817    ALT 15 01/03/2019 0817    BILITOT 1.2 (H) 01/03/2019 0817    ESTGFRAFRICA 59.2 (A) 01/03/2019 0817    EGFRNONAA 51.3 (A) 01/03/2019 0817           Lab Results   Component Value Date    TSH 4.113 (H) 08/14/2018      No results found for: CHOL  No results found for: HDL  No results found for: LDLCALC  No results found for: TRIG  No results found for: CHOLHDL      PHYSICAL EXAMINATION  Constitutional: Appears well, no distress  Neck: Supple, trachea midline.   Respiratory: No wheezes, even and unlabored.  Cardiovascular: RRR;+ BLE edema.   Lymph: deferred  Skin: warm and dry; no injection site reactions, no acanthosis nigracans observed.  Neuro:patient alert and cooperative, normal affect; uses walker.    Diabetes Foot Exam:   Deferred       Assessment/Plan  1. Type 2 diabetes mellitus with diabetic polyneuropathy, with long-term current use of insulin  Hemoglobin A1c  Add levemir 10 units at night  Reviewed w/ Dr. Sanchez and  Aviva Caldera NP  Decreased novolog 5 units ac w/ scale 180-230+1, etc  Log given  Cancel DE  F/u with [place] in 1.5-2 weeks     2. Liver transplanted  Managed per LTS   3. Long-term use of immunosuppressant medication  See above   4. Physical deconditioning  Will affect insulin resistance.  Has PT 2 x a week      FOLLOW UP  F/u in 1-2 weeks

## 2019-01-03 NOTE — TELEPHONE ENCOUNTER
----- Message from Gigi Sprague MD sent at 1/3/2019  2:13 PM CST -----  Add iron studies to next labs

## 2019-01-04 ENCOUNTER — OFFICE VISIT (OUTPATIENT)
Dept: ENDOCRINOLOGY | Facility: CLINIC | Age: 69
End: 2019-01-04
Payer: COMMERCIAL

## 2019-01-04 VITALS
DIASTOLIC BLOOD PRESSURE: 74 MMHG | HEIGHT: 64 IN | WEIGHT: 126.56 LBS | BODY MASS INDEX: 21.61 KG/M2 | HEART RATE: 80 BPM | SYSTOLIC BLOOD PRESSURE: 123 MMHG

## 2019-01-04 DIAGNOSIS — R53.81 PHYSICAL DECONDITIONING: ICD-10-CM

## 2019-01-04 DIAGNOSIS — Z94.4 LIVER TRANSPLANTED: ICD-10-CM

## 2019-01-04 DIAGNOSIS — E11.42 TYPE 2 DIABETES MELLITUS WITH DIABETIC POLYNEUROPATHY, WITH LONG-TERM CURRENT USE OF INSULIN: Primary | ICD-10-CM

## 2019-01-04 DIAGNOSIS — Z79.60 LONG-TERM USE OF IMMUNOSUPPRESSANT MEDICATION: ICD-10-CM

## 2019-01-04 DIAGNOSIS — Z79.4 TYPE 2 DIABETES MELLITUS WITH DIABETIC POLYNEUROPATHY, WITH LONG-TERM CURRENT USE OF INSULIN: Primary | ICD-10-CM

## 2019-01-04 PROCEDURE — 99214 OFFICE O/P EST MOD 30 MIN: CPT | Mod: S$GLB,,, | Performed by: NURSE PRACTITIONER

## 2019-01-04 PROCEDURE — 99214 OFFICE O/P EST MOD 30 MIN: CPT | Mod: PBBFAC | Performed by: NURSE PRACTITIONER

## 2019-01-04 PROCEDURE — 99999 PR PBB SHADOW E&M-EST. PATIENT-LVL IV: ICD-10-PCS | Mod: PBBFAC,,, | Performed by: NURSE PRACTITIONER

## 2019-01-04 PROCEDURE — 99999 PR PBB SHADOW E&M-EST. PATIENT-LVL IV: CPT | Mod: PBBFAC,,, | Performed by: NURSE PRACTITIONER

## 2019-01-04 PROCEDURE — 99214 PR OFFICE/OUTPT VISIT, EST, LEVL IV, 30-39 MIN: ICD-10-PCS | Mod: S$GLB,,, | Performed by: NURSE PRACTITIONER

## 2019-01-04 RX ORDER — DEXTROSE 4 G
TABLET,CHEWABLE ORAL
Qty: 1 EACH | Refills: 0 | Status: SHIPPED | OUTPATIENT
Start: 2019-01-04

## 2019-01-04 RX ORDER — BLOOD-GLUCOSE CONTROL, NORMAL
EACH MISCELLANEOUS
Qty: 200 EACH | Refills: 6 | Status: SHIPPED | OUTPATIENT
Start: 2019-01-04 | End: 2019-02-01 | Stop reason: SDUPTHER

## 2019-01-04 RX ORDER — INSULIN ASPART 100 [IU]/ML
INJECTION, SOLUTION INTRAVENOUS; SUBCUTANEOUS
Qty: 15 ML | Refills: 1
Start: 2019-01-04 | End: 2019-01-18

## 2019-01-04 NOTE — PATIENT INSTRUCTIONS
Snacks can be an important part of a balanced, healthy meal plan. They allow you to eat more frequently, feeling full and satisfied throughout the day. Also, they allow you to spread carbohydrates evenly, which may stabilize blood sugars.  Plus, snacks are enjoyable!     The amount of carbohydrate needed at snacks varies. Generally, about 15-30 grams of carbohydrate per snack is recommended.  Below you will find some tasty treats.       0-5 gm carb   Crystal Light   Vitamin Water Zero   Herbal tea, unsweetened   2 tsp peanut butter on celery   1./2 cup sugar-free jell-o   1 sugar-free popsicle   ¼ cup blueberries   8oz Blue Beryl unsweetened almond milk   5 baby carrots & celery sticks, cucumbers, bell peppers dipped in ¼ cup salsa, 2Tbsp light ranch dressing or 2Tbsp plain Greek yogurt   10 Goldfish crackers   ½ oz low-fat cheese or string cheese   1 closed handful of nuts, unsalted   1 Tbsp of sunflower seeds, unsalted   1 cup Smart Pop popcorn   1 whole grain brown rice cake        15 gm carb   1 small piece of fruit or ½ banana or 1/2 cup lite canned fruit   3 bruno cracker squares   3 cups Smart Pop popcorn, top spray butter, Damian lite salt or cinnamon and Truvia   5 Vanilla Wafers   ½ cup low fat, no added sugar ice cream or frozen yogurt (Blue bell, Blue Bunny, Weight Watchers, Skinny Cow)   ½ turkey, ham, or chicken sandwich   ½ c fruit with ½ c Cottage cheese   4-6 unsalted wheat crackers with 1 oz low fat cheese or 1 tbsp peanut butter    30-45 goldfish crackers (depending on flavor)    7-8 Advent mini brown rice cakes (caramel, apple cinnamon, chocolate)    12 Advent mini brown rice cakes (cheddar, bbq, ranch)    1/3 cup hummus dip with raw veg   1/2 whole wheat julia, 1Tbsp hummus   Mini Pizza (1/2 whole wheat English muffin, low-fat  cheese, tomato sauce)   100 calorie snack pack (Oreo, Chips Ahoy, Ritz Mix, Baked Cheetos)   4-6 oz. light or Greek Style yogurt  (Susana, Lalitha, Nathan, [facility])   ½ cup sugar-free pudding     6 in. wheat tortilla or julia oven toasted chips (topped with spray butter flavoring, cinnamon, Truvia OR spray butter, garlic powder, chili powder)    18 BBQ Popchips (available at [employer], Whole Foods, Fresh Market)                   Diabetes Support Group Meetings         Date: Topic:   February 14 Eat Fit PRISCILA/Health Promotion   March 14 Taking Care of Your Smile   April 11 Spring into Healthy Eating/Cooking Demo   May 9 Ease Your Mind with Diabetes   Sil 13 Summer Treats/Cooking Demo   July 11 Eat Fit PRISCILA/Super Market Sweep   August 8 Taking Care of Your Eyes and Feet   Sept 12 Technology/ADA updates   October 10 Recipes & Treats/Cooking Demo   November 14 Heart Health/Pump it up!   December 12 Year-End Close Out        Meetings are held in the [facility] (A) of the [facility] located at [address] [place] [address]. Please call [phone] for additional information.    Free service, offered every 2nd Thursday of every month! Family members and/or friends are welcome as well!  Support group is for patients with type 1 or type 2 diabetes.    From 3:30p to 4:30p

## 2019-01-07 ENCOUNTER — LAB VISIT (OUTPATIENT)
Dept: LAB | Facility: HOSPITAL | Age: 69
End: 2019-01-07
Attending: SURGERY
Payer: COMMERCIAL

## 2019-01-07 ENCOUNTER — TELEPHONE (OUTPATIENT)
Dept: TRANSPLANT | Facility: CLINIC | Age: 69
End: 2019-01-07

## 2019-01-07 DIAGNOSIS — Z94.4 STATUS POST LIVER TRANSPLANT: ICD-10-CM

## 2019-01-07 DIAGNOSIS — Z79.4 TYPE 2 DIABETES MELLITUS WITH DIABETIC POLYNEUROPATHY, WITH LONG-TERM CURRENT USE OF INSULIN: ICD-10-CM

## 2019-01-07 DIAGNOSIS — D64.9 ANEMIA, UNSPECIFIED TYPE: ICD-10-CM

## 2019-01-07 DIAGNOSIS — E11.42 TYPE 2 DIABETES MELLITUS WITH DIABETIC POLYNEUROPATHY, WITH LONG-TERM CURRENT USE OF INSULIN: ICD-10-CM

## 2019-01-07 LAB
ALBUMIN SERPL BCP-MCNC: 2.9 G/DL
ALP SERPL-CCNC: 140 U/L
ALT SERPL W/O P-5'-P-CCNC: 18 U/L
ANION GAP SERPL CALC-SCNC: 10 MMOL/L
AST SERPL-CCNC: 21 U/L
BASOPHILS # BLD AUTO: 0.02 K/UL
BASOPHILS NFR BLD: 0.8 %
BILIRUB DIRECT SERPL-MCNC: 0.5 MG/DL
BILIRUB SERPL-MCNC: 1 MG/DL
BUN SERPL-MCNC: 29 MG/DL
CALCIUM SERPL-MCNC: 8.7 MG/DL
CHLORIDE SERPL-SCNC: 101 MMOL/L
CO2 SERPL-SCNC: 27 MMOL/L
CREAT SERPL-MCNC: 1.1 MG/DL
DIFFERENTIAL METHOD: ABNORMAL
EOSINOPHIL # BLD AUTO: 0 K/UL
EOSINOPHIL NFR BLD: 0.4 %
ERYTHROCYTE [DISTWIDTH] IN BLOOD BY AUTOMATED COUNT: 16.4 %
EST. GFR  (AFRICAN AMERICAN): 59.2 ML/MIN/1.73 M^2
EST. GFR  (NON AFRICAN AMERICAN): 51.3 ML/MIN/1.73 M^2
ESTIMATED AVG GLUCOSE: 111 MG/DL
FERRITIN SERPL-MCNC: 2767 NG/ML
GLUCOSE SERPL-MCNC: 186 MG/DL
HBA1C MFR BLD HPLC: 5.5 %
HCT VFR BLD AUTO: 24.6 %
HGB BLD-MCNC: 7.8 G/DL
IMM GRANULOCYTES # BLD AUTO: 0.03 K/UL
IMM GRANULOCYTES NFR BLD AUTO: 1.2 %
IRON SERPL-MCNC: 60 UG/DL
LYMPHOCYTES # BLD AUTO: 0.5 K/UL
LYMPHOCYTES NFR BLD: 17.4 %
MCH RBC QN AUTO: 30.8 PG
MCHC RBC AUTO-ENTMCNC: 31.7 G/DL
MCV RBC AUTO: 97 FL
MONOCYTES # BLD AUTO: 0.5 K/UL
MONOCYTES NFR BLD: 18.2 %
NEUTROPHILS # BLD AUTO: 1.6 K/UL
NEUTROPHILS NFR BLD: 62 %
NRBC BLD-RTO: 0 /100 WBC
PLATELET # BLD AUTO: 123 K/UL
PMV BLD AUTO: 9.8 FL
POTASSIUM SERPL-SCNC: 4 MMOL/L
PROT SERPL-MCNC: 5.8 G/DL
RBC # BLD AUTO: 2.53 M/UL
SATURATED IRON: 29 %
SODIUM SERPL-SCNC: 138 MMOL/L
TACROLIMUS BLD-MCNC: 5.8 NG/ML
TOTAL IRON BINDING CAPACITY: 209 UG/DL
TRANSFERRIN SERPL-MCNC: 141 MG/DL
WBC # BLD AUTO: 2.58 K/UL

## 2019-01-07 PROCEDURE — 82248 BILIRUBIN DIRECT: CPT

## 2019-01-07 PROCEDURE — 82668 ASSAY OF ERYTHROPOIETIN: CPT

## 2019-01-07 PROCEDURE — 82728 ASSAY OF FERRITIN: CPT

## 2019-01-07 PROCEDURE — 80053 COMPREHEN METABOLIC PANEL: CPT

## 2019-01-07 PROCEDURE — 83036 HEMOGLOBIN GLYCOSYLATED A1C: CPT

## 2019-01-07 PROCEDURE — 36415 COLL VENOUS BLD VENIPUNCTURE: CPT

## 2019-01-07 PROCEDURE — 80197 ASSAY OF TACROLIMUS: CPT

## 2019-01-07 PROCEDURE — 83540 ASSAY OF IRON: CPT

## 2019-01-07 PROCEDURE — 85025 COMPLETE CBC W/AUTO DIFF WBC: CPT

## 2019-01-07 NOTE — TELEPHONE ENCOUNTER
----- Message from Gigi Sprague MD sent at 1/7/2019  1:46 PM CST -----  Results ok. No action needed

## 2019-01-07 NOTE — TELEPHONE ENCOUNTER
Notified patient via  that this is a follow up to your recent lab test.  Dr. Sprague reviewed your lab and it is stable.  There are no medication changes at this time.  Please repeat labs 1/14/19  Thank you  Leilani Holder, MSN, RNBC, CCTN    Liver Transplant Coordinator

## 2019-01-08 ENCOUNTER — OFFICE VISIT (OUTPATIENT)
Dept: TRANSPLANT | Facility: CLINIC | Age: 69
End: 2019-01-08
Payer: COMMERCIAL

## 2019-01-08 ENCOUNTER — TELEPHONE (OUTPATIENT)
Dept: TRANSPLANT | Facility: CLINIC | Age: 69
End: 2019-01-08

## 2019-01-08 VITALS
BODY MASS INDEX: 21.91 KG/M2 | HEIGHT: 63 IN | WEIGHT: 123.69 LBS | SYSTOLIC BLOOD PRESSURE: 113 MMHG | RESPIRATION RATE: 17 BRPM | TEMPERATURE: 98 F | OXYGEN SATURATION: 97 % | HEART RATE: 93 BPM | DIASTOLIC BLOOD PRESSURE: 49 MMHG

## 2019-01-08 DIAGNOSIS — Z79.60 LONG-TERM USE OF IMMUNOSUPPRESSANT MEDICATION: ICD-10-CM

## 2019-01-08 DIAGNOSIS — R53.81 PHYSICAL DECONDITIONING: ICD-10-CM

## 2019-01-08 DIAGNOSIS — Z94.4 S/P LIVER TRANSPLANT: Primary | ICD-10-CM

## 2019-01-08 DIAGNOSIS — T86.49 HEPATIC ARTERY THROMBOSIS OF TRANSPLANTED LIVER: ICD-10-CM

## 2019-01-08 DIAGNOSIS — I74.8 HEPATIC ARTERY THROMBOSIS OF TRANSPLANTED LIVER: ICD-10-CM

## 2019-01-08 DIAGNOSIS — R53.1 WEAKNESS: ICD-10-CM

## 2019-01-08 DIAGNOSIS — Z51.81 ENCOUNTER FOR THERAPEUTIC DRUG MONITORING: ICD-10-CM

## 2019-01-08 DIAGNOSIS — T81.89XD DELAYED SURGICAL WOUND HEALING, SUBSEQUENT ENCOUNTER: ICD-10-CM

## 2019-01-08 LAB
EPO SERPL-ACNC: 23.7 MIU/ML
FUNGUS SPEC CULT: NORMAL

## 2019-01-08 PROCEDURE — 99999 PR PBB SHADOW E&M-EST. PATIENT-LVL III: ICD-10-PCS | Mod: PBBFAC,,,

## 2019-01-08 PROCEDURE — 99999 PR PBB SHADOW E&M-EST. PATIENT-LVL III: CPT | Mod: PBBFAC,,,

## 2019-01-08 PROCEDURE — 99215 OFFICE O/P EST HI 40 MIN: CPT | Mod: 24,S$GLB,, | Performed by: TRANSPLANT SURGERY

## 2019-01-08 PROCEDURE — 99215 PR OFFICE/OUTPT VISIT, EST, LEVL V, 40-54 MIN: ICD-10-PCS | Mod: 24,S$GLB,, | Performed by: TRANSPLANT SURGERY

## 2019-01-08 NOTE — TELEPHONE ENCOUNTER
----- Message from John Ngo PharmD sent at 1/7/2019  3:19 PM CST -----  Regarding: RE: Iron studies  Dr. Sprague, Twila, and Ute    The patient's iron level is low-normal, TIBC is low, Sat Fe is normal, transferrin is low, ferritin is very elevated. Based on these readings, the patient probably suffers from anemia due to chronic disease. Patient's SCr is 1.1 and her calculated GFR is 51. The patient is not on bactrim or cellcept. There may some benefit from a trial of Procrit 10-20k weekly, but overall the patient's anemia should resolve as patient's overall health improves. Let me know if you would like a therapy plan for procrit placed, or if you have any questions.    Thanks,  John Ngo, PharmD. BCPS    ----- Message -----  From: Kim Holder RN  Sent: 1/7/2019   2:32 PM  To: Twila Rosales RN, #  Subject: Iron studies                                     Dr. Sprague would like for you to review her Iron studies  Fe = 60  TIBC = 209  Sat Fe = 29  Transferrin = 141  Ferritin = 2767    Thank you  Leilani - this is Twila's patient.  She will be back tomorrow

## 2019-01-08 NOTE — PROGRESS NOTES
Transplant Surgery  Liver Transplant Recipient Follow-up    Original Referring Physician: Gigi Sprague  Current Corresponding Physician:     Chief Complaint: Scarlett is here for follow up of her liver transplant performed 10/5/2018 for the primary diagnosis (UNOS) of Cirrhosis: Type B and D    ORGAN: LIVER  Whole or Partial: whole liver  Donor Type:  - brain death  PHS Increased Risk: no  Donor CMV Status: Positive  Donor HCV Status: Negative  Donor HBcAb: Negative  Donor HBV SETH: Negative  Donor HCV SETH: Negative    Biliary Anastomosis: end to end  Arterial Anatomy: replaced left hepatic and right hepatic  IVC reconstruction: end to end ivc  Portal vein status: partial patent    Subjective:     History of Present Illness: She has had the following complications since transplant: multiple, HAT, EVA, chylous ascites, wound. see d/c summary.  The noted complications are well controlled.    Interval History: Currently, she is doing well.  Current complaints include abdominal pain and back pain.  increasing activity, some intermittent constipation.  Scarlett is here for management of her immunosuppression medication.    Review of Systems   Constitutional: Negative for activity change, appetite change, chills and fever.   HENT: Negative for congestion, nosebleeds, sinus pressure, sore throat and voice change.    Eyes: Negative for pain and visual disturbance.   Respiratory: Negative for cough and shortness of breath.    Cardiovascular: Positive for leg swelling. Negative for palpitations.   Gastrointestinal: Positive for abdominal pain. Negative for abdominal distention, diarrhea, nausea and vomiting.   Endocrine: Negative for cold intolerance and heat intolerance.   Genitourinary: Negative for dysuria, flank pain, frequency and hematuria.   Musculoskeletal: Positive for back pain. Negative for gait problem.   Skin: Negative for color change, pallor and wound.   Allergic/Immunologic: Negative for food allergies.    Neurological: Negative for dizziness, seizures, numbness and headaches.   Hematological: Negative for adenopathy.   Psychiatric/Behavioral: Positive for sleep disturbance. Negative for agitation, behavioral problems and hallucinations. Self-injury: due to urinary frequency.       Objective:     Physical Exam   Constitutional: She is oriented to person, place, and time. She appears well-developed and well-nourished.   HENT:   Head: Normocephalic and atraumatic.   Eyes: EOM are normal. Pupils are equal, round, and reactive to light.   Cardiovascular: Normal rate and regular rhythm.   Pulmonary/Chest: Effort normal.   Abdominal: Soft. She exhibits no distension. There is no tenderness. There is no guarding.   Soft, nd, nttp,   Wound c/d/i, no cellulitis.     Musculoskeletal: Normal range of motion. She exhibits edema.   Neurological: She is alert and oriented to person, place, and time.   Skin: Skin is warm and dry.   Psychiatric: She has a normal mood and affect. Her behavior is normal.     Lab Results   Component Value Date    BILITOT 1.0 01/07/2019    AST 21 01/07/2019    ALT 18 01/07/2019    ALKPHOS 140 (H) 01/07/2019    CREATININE 1.1 01/07/2019    ALBUMIN 2.9 (L) 01/07/2019     Lab Results   Component Value Date    WBC 2.58 (L) 01/07/2019    HGB 7.8 (L) 01/07/2019    HCT 24.6 (L) 01/07/2019    HCT 21 (L) 10/06/2018     (L) 01/07/2019     Lab Results   Component Value Date    TACROLIMUS 5.8 01/07/2019       Assessment/Plan:          · S/P liver transplant.  · Chronic immunosuppressive medications for rejection prophylaxis at target.  Plan: no adjustment needed.  · Continue monitoring symptoms, labs and drug levels for drug-related toxicity and side effects.  · Incision: staples out, wound well-healed, no evidence of hernia  · Add stool softener  · Continue lasix 40mg  ·     MD NOEL Spear Jr Patient Status  Functional Status: 70% - Cares for self: unable to carry on normal activity or  active work  Physical Capacity: Limited Mobility

## 2019-01-10 ENCOUNTER — TELEPHONE (OUTPATIENT)
Dept: ADMINISTRATIVE | Facility: CLINIC | Age: 69
End: 2019-01-10

## 2019-01-14 ENCOUNTER — LAB VISIT (OUTPATIENT)
Dept: LAB | Facility: HOSPITAL | Age: 69
End: 2019-01-14
Attending: SURGERY
Payer: COMMERCIAL

## 2019-01-14 DIAGNOSIS — Z94.4 LIVER REPLACED BY TRANSPLANT: ICD-10-CM

## 2019-01-14 LAB
ALBUMIN SERPL BCP-MCNC: 3.2 G/DL
ALP SERPL-CCNC: 157 U/L
ALT SERPL W/O P-5'-P-CCNC: 24 U/L
ANION GAP SERPL CALC-SCNC: 10 MMOL/L
AST SERPL-CCNC: 23 U/L
BASOPHILS # BLD AUTO: 0.03 K/UL
BASOPHILS NFR BLD: 1 %
BILIRUB DIRECT SERPL-MCNC: 0.5 MG/DL
BILIRUB SERPL-MCNC: 0.9 MG/DL
BUN SERPL-MCNC: 40 MG/DL
CALCIUM SERPL-MCNC: 9.4 MG/DL
CHLORIDE SERPL-SCNC: 103 MMOL/L
CO2 SERPL-SCNC: 27 MMOL/L
CREAT SERPL-MCNC: 1.1 MG/DL
DIFFERENTIAL METHOD: ABNORMAL
EOSINOPHIL # BLD AUTO: 0 K/UL
EOSINOPHIL NFR BLD: 1.3 %
ERYTHROCYTE [DISTWIDTH] IN BLOOD BY AUTOMATED COUNT: 15.8 %
EST. GFR  (AFRICAN AMERICAN): 59.2 ML/MIN/1.73 M^2
EST. GFR  (NON AFRICAN AMERICAN): 51.3 ML/MIN/1.73 M^2
GLUCOSE SERPL-MCNC: 127 MG/DL
HCT VFR BLD AUTO: 26.7 %
HGB BLD-MCNC: 8.3 G/DL
IMM GRANULOCYTES # BLD AUTO: 0.12 K/UL
IMM GRANULOCYTES NFR BLD AUTO: 4 %
LYMPHOCYTES # BLD AUTO: 0.7 K/UL
LYMPHOCYTES NFR BLD: 23.3 %
MCH RBC QN AUTO: 31.3 PG
MCHC RBC AUTO-ENTMCNC: 31.1 G/DL
MCV RBC AUTO: 101 FL
MONOCYTES # BLD AUTO: 0.5 K/UL
MONOCYTES NFR BLD: 15.3 %
NEUTROPHILS # BLD AUTO: 1.7 K/UL
NEUTROPHILS NFR BLD: 55.1 %
NRBC BLD-RTO: 0 /100 WBC
PLATELET # BLD AUTO: 111 K/UL
PMV BLD AUTO: 10.6 FL
POTASSIUM SERPL-SCNC: 4.2 MMOL/L
PROT SERPL-MCNC: 6.2 G/DL
RBC # BLD AUTO: 2.65 M/UL
SODIUM SERPL-SCNC: 140 MMOL/L
TACROLIMUS BLD-MCNC: 4.4 NG/ML
WBC # BLD AUTO: 3.01 K/UL

## 2019-01-14 PROCEDURE — 80053 COMPREHEN METABOLIC PANEL: CPT

## 2019-01-14 PROCEDURE — 85025 COMPLETE CBC W/AUTO DIFF WBC: CPT

## 2019-01-14 PROCEDURE — 80197 ASSAY OF TACROLIMUS: CPT

## 2019-01-14 PROCEDURE — 36415 COLL VENOUS BLD VENIPUNCTURE: CPT

## 2019-01-14 PROCEDURE — 82248 BILIRUBIN DIRECT: CPT

## 2019-01-16 ENCOUNTER — TELEPHONE (OUTPATIENT)
Dept: TRANSPLANT | Facility: CLINIC | Age: 69
End: 2019-01-16

## 2019-01-16 NOTE — TELEPHONE ENCOUNTER
----- Message from Gigi Sprague MD sent at 1/14/2019  2:29 PM CST -----  Results ok. No action needed

## 2019-01-17 NOTE — PROGRESS NOTES
CHIEF COMPLAINT: Type 2 Diabetes     HPI:      Patient is a 68 y.o. female with a diagnosis of ESLD, listed for liver transplant with meld 23 who underwent live transplant on 10/5/18.  Back to OR on 10/6/18 for ex lap.  Admitted 10/1/18 for hyponatremia s/p paracentesis.  Personal has known diagnosis of diabetes, endocrine consulted for diabetes management.     Post-operative course complicated by ESBL Klebsiella pneumoniae in urine (in contact isolation), peritransplant ascites, worsening renal function (required temporary dialysis), anemia (multiple blood transfusions), and possible GI bleed.    Diagnosed with t2dm x 6 years ago.    Pt is currently off prednisone.    Pt speaks Divehi, needs .    Hospitalized x 4 mos.   and family members present during visit.    Since then, doing much better. Eats 3 meals, snacks on fruits here and there in between, snack at bedtime-slice a bread with 1 other item.  Recent education visit for meter training, review.     Lab Results   Component Value Date    HGBA1C 5.5 01/07/2019     PREVIOUS DIABETES MEDICATIONS TRIED  novolog  metformin    CURRENT DIABETIC MEDS: Levemir 8-10 units at night, novolog 5 units w/ meals plus scale 180-230+1 etc    Pt is monitoring blood glucose readings 4 times a day.  Needs ~100 strips per month related to fluctuations with blood glucose reading, a1c trends, and activity level.            Last Podiatry Exam: none    REVIEW OF SYSTEMS  General: no weakness, fatigue, + weight changes.   Eyes: no double or blurred vision, eye pain, or redness; Last Eye Exam=> 6mos .   Cardiovascular: no chest pain, palpitations, + edema, or murmurs.   Respiratory: no cough or dyspnea.   GI: no heartburn, nausea, or changes in bowel patterns; good appetite.   Skin: no rashes, dryness, itching, or reactions at insulin injection sites.  Neuro: + numbness, tingling, tremors, or vertigo. (R) lower side pain  Endocrine: no polyuria, polydipsia,  "polyphagia, heat or cold intolerance.     Vital Signs  /74 (BP Location: Right arm, Patient Position: Sitting, BP Method: Medium (Manual))   Pulse 81   Ht 5' 8" (1.727 m)   Wt 56 kg (123 lb 7.3 oz)   LMP  (LMP Unknown)   BMI 18.77 kg/m²     Hemoglobin A1C   Date Value Ref Range Status   01/07/2019 5.5 4.0 - 5.6 % Final     Comment:     ADA Screening Guidelines:  5.7-6.4%  Consistent with prediabetes  >or=6.5%  Consistent with diabetes  High levels of fetal hemoglobin interfere with the HbA1C  assay. Heterozygous hemoglobin variants (HbS, HgC, etc)do  not significantly interfere with this assay.   However, presence of multiple variants may affect accuracy.     10/04/2018 6.0 (H) 4.0 - 5.6 % Final     Comment:     ADA Screening Guidelines:  5.7-6.4%  Consistent with prediabetes  >or=6.5%  Consistent with diabetes  High levels of fetal hemoglobin interfere with the HbA1C  assay. Heterozygous hemoglobin variants (HbS, HgC, etc)do  not significantly interfere with this assay.   However, presence of multiple variants may affect accuracy.     08/16/2018 6.8 (H) 4.0 - 5.6 % Final     Comment:     ADA Screening Guidelines:  5.7-6.4%  Consistent with prediabetes  >or=6.5%  Consistent with diabetes  High levels of fetal hemoglobin interfere with the HbA1C  assay. Heterozygous hemoglobin variants (HbS, HgC, etc)do  not significantly interfere with this assay.   However, presence of multiple variants may affect accuracy.          Chemistry        Component Value Date/Time     01/14/2019 0715    K 4.2 01/14/2019 0715     01/14/2019 0715    CO2 27 01/14/2019 0715    BUN 40 (H) 01/14/2019 0715    CREATININE 1.1 01/14/2019 0715     (H) 01/14/2019 0715        Component Value Date/Time    CALCIUM 9.4 01/14/2019 0715    ALKPHOS 157 (H) 01/14/2019 0715    AST 23 01/14/2019 0715    ALT 24 01/14/2019 0715    BILITOT 0.9 01/14/2019 0715    ESTGFRAFRICA 59.2 (A) 01/14/2019 0715    EGFRNONAA 51.3 (A) 01/14/2019 " 0715           Lab Results   Component Value Date    TSH 4.113 (H) 08/14/2018      No results found for: CHOL  No results found for: HDL  No results found for: LDLCALC  No results found for: TRIG  No results found for: CHOLHDL      PHYSICAL EXAMINATION  Constitutional: Appears well, no distress  Neck: Supple, trachea midline.   Respiratory: No wheezes, even and unlabored.  Cardiovascular: RRR;+ BLE edema-improved.   Lymph: deferred  Skin: warm and dry; no injection site reactions, no acanthosis nigracans observed.  Neuro:patient alert and cooperative, normal affect; uses walker.    Diabetes Foot Exam:   Deferred       Assessment/Plan  1. Type 2 diabetes mellitus with diabetic polyneuropathy, with long-term current use of insulin  Hemoglobin A1c  Cut levemir by 20% to 8 units at night  Add levemir 10 units at night  Reviewed w/ Dr. Sanchez and Aviva Caldera NP  Change novolog to 5-6-6 units ac w/ scale 180-230+1, etc  Log given  F/u with [place] in 2 weeks     2. Liver transplanted  Managed per LTS   3. Long-term use of immunosuppressant medication  See above   4. Physical deconditioning  Will affect insulin resistance.  Has PT 2 x a week      FOLLOW UP  F/u in 2 weeks

## 2019-01-18 ENCOUNTER — OFFICE VISIT (OUTPATIENT)
Dept: ENDOCRINOLOGY | Facility: CLINIC | Age: 69
End: 2019-01-18
Payer: COMMERCIAL

## 2019-01-18 VITALS
WEIGHT: 123.44 LBS | SYSTOLIC BLOOD PRESSURE: 116 MMHG | BODY MASS INDEX: 18.71 KG/M2 | DIASTOLIC BLOOD PRESSURE: 74 MMHG | HEART RATE: 81 BPM | HEIGHT: 68 IN

## 2019-01-18 DIAGNOSIS — Z79.4 TYPE 2 DIABETES MELLITUS WITH DIABETIC POLYNEUROPATHY, WITH LONG-TERM CURRENT USE OF INSULIN: Primary | ICD-10-CM

## 2019-01-18 DIAGNOSIS — E11.42 TYPE 2 DIABETES MELLITUS WITH DIABETIC POLYNEUROPATHY, WITH LONG-TERM CURRENT USE OF INSULIN: Primary | ICD-10-CM

## 2019-01-18 DIAGNOSIS — R53.81 PHYSICAL DECONDITIONING: ICD-10-CM

## 2019-01-18 DIAGNOSIS — Z29.89 PROPHYLACTIC IMMUNOTHERAPY: ICD-10-CM

## 2019-01-18 DIAGNOSIS — Z94.4 LIVER TRANSPLANTED: ICD-10-CM

## 2019-01-18 PROCEDURE — 99999 PR PBB SHADOW E&M-EST. PATIENT-LVL III: CPT | Mod: PBBFAC,,, | Performed by: NURSE PRACTITIONER

## 2019-01-18 PROCEDURE — 99999 PR PBB SHADOW E&M-EST. PATIENT-LVL III: ICD-10-PCS | Mod: PBBFAC,,, | Performed by: NURSE PRACTITIONER

## 2019-01-18 PROCEDURE — 99213 OFFICE O/P EST LOW 20 MIN: CPT | Mod: PBBFAC | Performed by: NURSE PRACTITIONER

## 2019-01-18 PROCEDURE — 99213 OFFICE O/P EST LOW 20 MIN: CPT | Mod: S$GLB,,, | Performed by: NURSE PRACTITIONER

## 2019-01-18 PROCEDURE — 99213 PR OFFICE/OUTPT VISIT, EST, LEVL III, 20-29 MIN: ICD-10-PCS | Mod: S$GLB,,, | Performed by: NURSE PRACTITIONER

## 2019-01-18 RX ORDER — INSULIN ASPART 100 [IU]/ML
INJECTION, SOLUTION INTRAVENOUS; SUBCUTANEOUS
Qty: 15 ML | Refills: 1
Start: 2019-01-18 | End: 2019-02-01

## 2019-01-20 ENCOUNTER — NURSE TRIAGE (OUTPATIENT)
Dept: ADMINISTRATIVE | Facility: CLINIC | Age: 69
End: 2019-01-20

## 2019-01-21 ENCOUNTER — LAB VISIT (OUTPATIENT)
Dept: LAB | Facility: HOSPITAL | Age: 69
End: 2019-01-21
Attending: SURGERY
Payer: COMMERCIAL

## 2019-01-21 ENCOUNTER — TELEPHONE (OUTPATIENT)
Dept: INFECTIOUS DISEASES | Facility: HOSPITAL | Age: 69
End: 2019-01-21

## 2019-01-21 ENCOUNTER — TELEPHONE (OUTPATIENT)
Dept: TRANSPLANT | Facility: CLINIC | Age: 69
End: 2019-01-21

## 2019-01-21 DIAGNOSIS — Z94.4 STATUS POST LIVER TRANSPLANT: ICD-10-CM

## 2019-01-21 LAB
ALBUMIN SERPL BCP-MCNC: 3.2 G/DL
ALP SERPL-CCNC: 151 U/L
ALT SERPL W/O P-5'-P-CCNC: 22 U/L
ANION GAP SERPL CALC-SCNC: 11 MMOL/L
AST SERPL-CCNC: 22 U/L
BASOPHILS # BLD AUTO: 0.02 K/UL
BASOPHILS NFR BLD: 0.6 %
BILIRUB DIRECT SERPL-MCNC: 0.5 MG/DL
BILIRUB SERPL-MCNC: 1.1 MG/DL
BUN SERPL-MCNC: 48 MG/DL
CALCIUM SERPL-MCNC: 9.4 MG/DL
CHLORIDE SERPL-SCNC: 105 MMOL/L
CO2 SERPL-SCNC: 24 MMOL/L
CREAT SERPL-MCNC: 1.2 MG/DL
DIFFERENTIAL METHOD: ABNORMAL
EOSINOPHIL # BLD AUTO: 0.1 K/UL
EOSINOPHIL NFR BLD: 2.2 %
ERYTHROCYTE [DISTWIDTH] IN BLOOD BY AUTOMATED COUNT: 15.2 %
EST. GFR  (AFRICAN AMERICAN): 53.3 ML/MIN/1.73 M^2
EST. GFR  (NON AFRICAN AMERICAN): 46.2 ML/MIN/1.73 M^2
GLUCOSE SERPL-MCNC: 123 MG/DL
HCT VFR BLD AUTO: 25.6 %
HGB BLD-MCNC: 8 G/DL
IMM GRANULOCYTES # BLD AUTO: 0.06 K/UL
IMM GRANULOCYTES NFR BLD AUTO: 1.8 %
LYMPHOCYTES # BLD AUTO: 0.6 K/UL
LYMPHOCYTES NFR BLD: 17.2 %
MCH RBC QN AUTO: 30.4 PG
MCHC RBC AUTO-ENTMCNC: 31.3 G/DL
MCV RBC AUTO: 97 FL
MONOCYTES # BLD AUTO: 0.4 K/UL
MONOCYTES NFR BLD: 13.2 %
NEUTROPHILS # BLD AUTO: 2.1 K/UL
NEUTROPHILS NFR BLD: 65 %
NRBC BLD-RTO: 0 /100 WBC
PLATELET # BLD AUTO: 80 K/UL
PMV BLD AUTO: 10.9 FL
POTASSIUM SERPL-SCNC: 4.5 MMOL/L
PROT SERPL-MCNC: 6.2 G/DL
RBC # BLD AUTO: 2.63 M/UL
SODIUM SERPL-SCNC: 140 MMOL/L
TACROLIMUS BLD-MCNC: 4.4 NG/ML
WBC # BLD AUTO: 3.25 K/UL

## 2019-01-21 PROCEDURE — 80197 ASSAY OF TACROLIMUS: CPT

## 2019-01-21 PROCEDURE — 36415 COLL VENOUS BLD VENIPUNCTURE: CPT

## 2019-01-21 PROCEDURE — 80053 COMPREHEN METABOLIC PANEL: CPT

## 2019-01-21 PROCEDURE — 85025 COMPLETE CBC W/AUTO DIFF WBC: CPT

## 2019-01-21 PROCEDURE — 82248 BILIRUBIN DIRECT: CPT

## 2019-01-21 NOTE — TELEPHONE ENCOUNTER
Received voicemail to schedule patient for HBIG infusion.  Auth still pending.  Message sent to preservice and they are currently working on trying to obtain the approval.  Left message for NED Rosales in transplant updating her on status.  Once approval is obtained we will be able to schedule the patient.

## 2019-01-21 NOTE — TELEPHONE ENCOUNTER
Notified patient via language line - that this is a follow up to your recent lab test.  Dr. Sprague reviewed your lab and it is stable.  There are no medication changes at this time.  Please repeat labs 1/28/19.  WIll send trough portal  Thank you  Leilani Holder, MSN, RNBC, CCTN    Liver Transplant Coordinator

## 2019-01-21 NOTE — TELEPHONE ENCOUNTER
----- Message from Gigi Sprague MD sent at 1/21/2019  2:37 PM CST -----  Results ok. No action needed

## 2019-01-22 ENCOUNTER — OFFICE VISIT (OUTPATIENT)
Dept: TRANSPLANT | Facility: CLINIC | Age: 69
End: 2019-01-22
Payer: COMMERCIAL

## 2019-01-22 VITALS
OXYGEN SATURATION: 100 % | SYSTOLIC BLOOD PRESSURE: 112 MMHG | BODY MASS INDEX: 20.57 KG/M2 | RESPIRATION RATE: 18 BRPM | HEART RATE: 82 BPM | TEMPERATURE: 98 F | HEIGHT: 65 IN | WEIGHT: 123.44 LBS | DIASTOLIC BLOOD PRESSURE: 55 MMHG

## 2019-01-22 DIAGNOSIS — Z94.4 STATUS POST LIVER TRANSPLANT: Primary | ICD-10-CM

## 2019-01-22 DIAGNOSIS — R10.10 PAIN OF UPPER ABDOMEN: ICD-10-CM

## 2019-01-22 DIAGNOSIS — Z51.81 ENCOUNTER FOR THERAPEUTIC DRUG MONITORING: ICD-10-CM

## 2019-01-22 PROCEDURE — 99499 NO LOS: ICD-10-PCS | Mod: S$GLB,,, | Performed by: SURGERY

## 2019-01-22 PROCEDURE — 99999 PR PBB SHADOW E&M-EST. PATIENT-LVL III: CPT | Mod: PBBFAC,,,

## 2019-01-22 PROCEDURE — 99999 PR PBB SHADOW E&M-EST. PATIENT-LVL III: ICD-10-PCS | Mod: PBBFAC,,,

## 2019-01-22 PROCEDURE — 99499 UNLISTED E&M SERVICE: CPT | Mod: S$GLB,,, | Performed by: SURGERY

## 2019-01-22 RX ORDER — VALACYCLOVIR HYDROCHLORIDE 1 G/1
1000 TABLET, FILM COATED ORAL 2 TIMES DAILY
Qty: 14 TABLET | Refills: 0 | Status: SHIPPED | OUTPATIENT
Start: 2019-01-22 | End: 2019-01-29

## 2019-01-22 RX ORDER — FUROSEMIDE 20 MG/1
40 TABLET ORAL DAILY
Qty: 30 TABLET | Refills: 5
Start: 2019-01-22 | End: 2019-02-01

## 2019-01-22 NOTE — PROGRESS NOTES
Met with patient and   1. Leg edema : increase lasix to 60 mg daily for 5 days then return to 40 mg daily  2. Lesions on corner of mouth : treat with valtrix x 7 days  3. abd ultrasound  4. Hepatology appt prior to returning home

## 2019-01-23 ENCOUNTER — HOSPITAL ENCOUNTER (OUTPATIENT)
Dept: RADIOLOGY | Facility: HOSPITAL | Age: 69
Discharge: HOME | End: 2019-01-23
Attending: SURGERY
Payer: COMMERCIAL

## 2019-01-23 ENCOUNTER — TELEPHONE (OUTPATIENT)
Dept: TRANSPLANT | Facility: CLINIC | Age: 69
End: 2019-01-23

## 2019-01-23 DIAGNOSIS — R10.10 PAIN OF UPPER ABDOMEN: ICD-10-CM

## 2019-01-23 DIAGNOSIS — Z94.4 STATUS POST LIVER TRANSPLANT: ICD-10-CM

## 2019-01-23 PROCEDURE — 76700 US EXAM ABDOM COMPLETE: CPT | Mod: TC

## 2019-01-23 PROCEDURE — 76700 US ABDOMEN COMPLETE: ICD-10-PCS | Mod: 26,,, | Performed by: RADIOLOGY

## 2019-01-23 PROCEDURE — 76700 US EXAM ABDOM COMPLETE: CPT | Mod: 26,,, | Performed by: RADIOLOGY

## 2019-01-23 NOTE — TELEPHONE ENCOUNTER
----- Message from Gigi Sprague MD sent at 1/23/2019  3:07 PM CST -----  Results ok. No action needed

## 2019-01-28 ENCOUNTER — LAB VISIT (OUTPATIENT)
Dept: LAB | Facility: HOSPITAL | Age: 69
End: 2019-01-28
Attending: SURGERY
Payer: COMMERCIAL

## 2019-01-28 DIAGNOSIS — Z94.4 STATUS POST LIVER TRANSPLANT: ICD-10-CM

## 2019-01-28 LAB
ALBUMIN SERPL BCP-MCNC: 3.6 G/DL
ALP SERPL-CCNC: 158 U/L
ALT SERPL W/O P-5'-P-CCNC: 33 U/L
ANION GAP SERPL CALC-SCNC: 10 MMOL/L
AST SERPL-CCNC: 28 U/L
BASOPHILS # BLD AUTO: 0.03 K/UL
BASOPHILS NFR BLD: 0.9 %
BILIRUB DIRECT SERPL-MCNC: 0.5 MG/DL
BILIRUB SERPL-MCNC: 1.3 MG/DL
BUN SERPL-MCNC: 58 MG/DL
CALCIUM SERPL-MCNC: 9.9 MG/DL
CHLORIDE SERPL-SCNC: 101 MMOL/L
CO2 SERPL-SCNC: 29 MMOL/L
CREAT SERPL-MCNC: 1.4 MG/DL
DIFFERENTIAL METHOD: ABNORMAL
EOSINOPHIL # BLD AUTO: 0.1 K/UL
EOSINOPHIL NFR BLD: 1.9 %
ERYTHROCYTE [DISTWIDTH] IN BLOOD BY AUTOMATED COUNT: 15 %
EST. GFR  (AFRICAN AMERICAN): 44.2 ML/MIN/1.73 M^2
EST. GFR  (NON AFRICAN AMERICAN): 38.4 ML/MIN/1.73 M^2
GLUCOSE SERPL-MCNC: 145 MG/DL
HCT VFR BLD AUTO: 25.5 %
HGB BLD-MCNC: 8.4 G/DL
IMM GRANULOCYTES # BLD AUTO: 0.05 K/UL
IMM GRANULOCYTES NFR BLD AUTO: 1.5 %
LYMPHOCYTES # BLD AUTO: 0.7 K/UL
LYMPHOCYTES NFR BLD: 22.9 %
MCH RBC QN AUTO: 31.5 PG
MCHC RBC AUTO-ENTMCNC: 32.9 G/DL
MCV RBC AUTO: 96 FL
MONOCYTES # BLD AUTO: 0.3 K/UL
MONOCYTES NFR BLD: 10.5 %
NEUTROPHILS # BLD AUTO: 2 K/UL
NEUTROPHILS NFR BLD: 62.3 %
NRBC BLD-RTO: 0 /100 WBC
PLATELET # BLD AUTO: 78 K/UL
PMV BLD AUTO: 10.1 FL
POTASSIUM SERPL-SCNC: 4.2 MMOL/L
PROT SERPL-MCNC: 6.7 G/DL
RBC # BLD AUTO: 2.67 M/UL
SODIUM SERPL-SCNC: 140 MMOL/L
TACROLIMUS BLD-MCNC: 4 NG/ML
WBC # BLD AUTO: 3.23 K/UL

## 2019-01-28 PROCEDURE — 82248 BILIRUBIN DIRECT: CPT

## 2019-01-28 PROCEDURE — 80053 COMPREHEN METABOLIC PANEL: CPT

## 2019-01-28 PROCEDURE — 85025 COMPLETE CBC W/AUTO DIFF WBC: CPT

## 2019-01-28 PROCEDURE — 36415 COLL VENOUS BLD VENIPUNCTURE: CPT

## 2019-01-28 PROCEDURE — 80197 ASSAY OF TACROLIMUS: CPT

## 2019-01-28 NOTE — TELEPHONE ENCOUNTER
Pt notified via [employer] dept to increase prograf to 4 mg bid from 3 mg will repeat labs on thursday

## 2019-01-29 ENCOUNTER — OFFICE VISIT (OUTPATIENT)
Dept: TRANSPLANT | Facility: CLINIC | Age: 69
End: 2019-01-29
Payer: COMMERCIAL

## 2019-01-29 VITALS
OXYGEN SATURATION: 100 % | DIASTOLIC BLOOD PRESSURE: 56 MMHG | HEART RATE: 88 BPM | TEMPERATURE: 88 F | SYSTOLIC BLOOD PRESSURE: 121 MMHG | WEIGHT: 119.06 LBS | BODY MASS INDEX: 19.83 KG/M2 | HEIGHT: 65 IN | RESPIRATION RATE: 18 BRPM

## 2019-01-29 DIAGNOSIS — Z29.89 PROPHYLACTIC IMMUNOTHERAPY: ICD-10-CM

## 2019-01-29 DIAGNOSIS — T86.49 STENOSIS OF HEPATIC ARTERY OF TRANSPLANTED LIVER: ICD-10-CM

## 2019-01-29 DIAGNOSIS — I77.1 STENOSIS OF HEPATIC ARTERY OF TRANSPLANTED LIVER: ICD-10-CM

## 2019-01-29 DIAGNOSIS — Z94.4 LIVER TRANSPLANTED: ICD-10-CM

## 2019-01-29 DIAGNOSIS — G89.4 CHRONIC PAIN SYNDROME: Primary | ICD-10-CM

## 2019-01-29 DIAGNOSIS — K74.60 CHRONIC HEPATITIS B WITH DELTA AGENT WITH CIRRHOSIS: ICD-10-CM

## 2019-01-29 DIAGNOSIS — B18.0 CHRONIC HEPATITIS B WITH DELTA AGENT WITH CIRRHOSIS: ICD-10-CM

## 2019-01-29 DIAGNOSIS — Z48.23 ENCOUNTER FOR AFTERCARE FOLLOWING LIVER TRANSPLANT: ICD-10-CM

## 2019-01-29 PROCEDURE — 99999 PR PBB SHADOW E&M-EST. PATIENT-LVL IV: CPT | Mod: PBBFAC,,, | Performed by: INTERNAL MEDICINE

## 2019-01-29 PROCEDURE — 99215 PR OFFICE/OUTPT VISIT, EST, LEVL V, 40-54 MIN: ICD-10-PCS | Mod: S$GLB,,, | Performed by: INTERNAL MEDICINE

## 2019-01-29 PROCEDURE — 99215 OFFICE O/P EST HI 40 MIN: CPT | Mod: S$GLB,,, | Performed by: INTERNAL MEDICINE

## 2019-01-29 PROCEDURE — 99999 PR PBB SHADOW E&M-EST. PATIENT-LVL IV: ICD-10-PCS | Mod: PBBFAC,,, | Performed by: INTERNAL MEDICINE

## 2019-01-29 RX ORDER — DULOXETIN HYDROCHLORIDE 20 MG/1
20 CAPSULE, DELAYED RELEASE ORAL DAILY
Qty: 30 CAPSULE | Refills: 11 | Status: SHIPPED | OUTPATIENT
Start: 2019-01-29 | End: 2020-01-29

## 2019-01-29 RX ORDER — TACROLIMUS 1 MG/1
4 CAPSULE ORAL EVERY 12 HOURS
Qty: 240 CAPSULE | Refills: 11 | Status: SHIPPED | OUTPATIENT
Start: 2019-01-29

## 2019-01-29 NOTE — PROGRESS NOTES
Transplant Hepatology  Liver Transplant Recipient Follow-up    Original Referring Physician: Gigi Sprague  Current Corresponding Physician:     Chief Complaint: Scarlett is here for follow up of her liver transplant performed 10/5/2018 for the primary diagnosis (UNOS) of Cirrhosis: Type B and D    ORGAN: LIVER  Whole or Partial: whole liver  Donor Type:  - brain death  PHS Increased Risk: no  Donor CMV Status: Positive  Donor HCV Status: Negative  Donor HBcAb: Negative  Donor HBV SETH: Negative  Donor HCV SETH: Negative    Biliary Anastomosis: end to end  Arterial Anatomy: replaced left hepatic and right hepatic  IVC reconstruction: end to end ivc  Portal vein status: partial patent    Subjective:     History of Present Illness: She has had the following complications since transplant: multiple, HAT, EVA, chylous ascites, wound. see d/c summary.  The noted complications are well controlled.    Interval History: Currently, she is doing well.  Current complaints include right sided abd pain -chronic.  Scarlett is here for management of her immunosuppression medication.    Review of Systems   Constitutional: Negative for activity change, appetite change, chills and fever.   HENT: Negative for congestion, nosebleeds, sinus pressure, sore throat and voice change.    Eyes: Negative for pain and visual disturbance.   Respiratory: Negative for cough and shortness of breath.    Cardiovascular: Positive for leg swelling. Negative for palpitations.   Gastrointestinal: Negative for abdominal distention, abdominal pain, diarrhea, nausea and vomiting.   Endocrine: Negative for cold intolerance and heat intolerance.   Genitourinary: Negative for dysuria, flank pain, frequency and hematuria.   Musculoskeletal: Negative for back pain and gait problem.   Skin: Negative for color change, pallor and wound.   Allergic/Immunologic: Negative for food allergies.   Neurological: Negative for dizziness, seizures, numbness and headaches.    Hematological: Negative for adenopathy.   Psychiatric/Behavioral: Positive for sleep disturbance. Negative for agitation, behavioral problems and hallucinations. Self-injury: due to urinary frequency.       Objective:     Physical Exam   Constitutional: She is oriented to person, place, and time. She appears well-developed and well-nourished.   HENT:   Head: Normocephalic and atraumatic.   Eyes: EOM are normal. Pupils are equal, round, and reactive to light.   Cardiovascular: Normal rate and regular rhythm.   Pulmonary/Chest: Effort normal.   Abdominal: Soft. She exhibits no distension. There is no tenderness. There is no guarding.   Soft, nd, nttp,   Wound c/d/i, no cellulitis.     Musculoskeletal: Normal range of motion. She exhibits edema.   Neurological: She is alert and oriented to person, place, and time.   Skin: Skin is warm and dry.   Psychiatric: She has a normal mood and affect. Her behavior is normal.     Lab Results   Component Value Date    BILITOT 1.3 (H) 01/28/2019    AST 28 01/28/2019    ALT 33 01/28/2019    ALKPHOS 158 (H) 01/28/2019    CREATININE 1.4 01/28/2019    ALBUMIN 3.6 01/28/2019     Lab Results   Component Value Date    WBC 3.23 (L) 01/28/2019    HGB 8.4 (L) 01/28/2019    HCT 25.5 (L) 01/28/2019    HCT 21 (L) 10/06/2018    PLT 78 (L) 01/28/2019     Lab Results   Component Value Date    TACROLIMUS 4.0 (L) 01/28/2019       Assessment/Plan:          · S/P liver transplant.  · Chronic immunosuppressive medications for rejection prophylaxis at target.  Plan: no adjustment needed.  · Continue monitoring symptoms, labs and drug levels for drug-related toxicity and side effects.  · Incision: staples out, wound well-healed, no evidence of hernia  · Patient is ok to return home to [facility] and follow up with a local transplant physician  · Will start on duloxetine 20 mg for chronic pain syndrome  · Continue lasix 40mg    MD HEMANTH Best Patient Status  Functional Status: 70% - Cares for  self: unable to carry on normal activity or active work  Physical Capacity: Limited Mobility

## 2019-01-29 NOTE — LETTER
January 31, 2019                     [facility]  [address]  [facility] [address]  Phone: [phone]   Patient: Scarlett Reddy   MR Number: [id number]   YOB: 1950   Date of Visit: 1/29/2019       Dear      Thank you for referring Scarlett Reddy to me for evaluation. Attached you will find relevant portions of my assessment and plan of care.    If you have questions, please do not hesitate to call me. I look forward to following Scarlett Reddy along with you.    Sincerely,    Jerel Worrell MD    Enclosure    If you would like to receive this communication electronically, please contact [email or web address] or [phone] to request [facility] access.    [employer] is a tool which provides read-only access to select patient information with whom you have a relationship. Its easy to use and provides real time access to review your patients record including encounter summaries, notes, results, and demographic information.    If you feel you have received this communication in error or would no longer like to receive these types of communications, please e-mail [email or web address]

## 2019-01-30 ENCOUNTER — OFFICE VISIT (OUTPATIENT)
Dept: TRANSPLANT | Facility: CLINIC | Age: 69
End: 2019-01-30
Payer: COMMERCIAL

## 2019-01-30 VITALS
SYSTOLIC BLOOD PRESSURE: 118 MMHG | RESPIRATION RATE: 18 BRPM | DIASTOLIC BLOOD PRESSURE: 58 MMHG | OXYGEN SATURATION: 100 % | TEMPERATURE: 98 F | HEART RATE: 86 BPM

## 2019-01-30 DIAGNOSIS — Z51.81 ENCOUNTER FOR THERAPEUTIC DRUG MONITORING: Primary | ICD-10-CM

## 2019-01-30 DIAGNOSIS — Z94.4 LIVER REPLACED BY TRANSPLANT: ICD-10-CM

## 2019-01-30 PROCEDURE — 99499 UNLISTED E&M SERVICE: CPT | Mod: S$GLB,,, | Performed by: SURGERY

## 2019-01-30 PROCEDURE — 99999 PR PBB SHADOW E&M-EST. PATIENT-LVL III: ICD-10-PCS | Mod: PBBFAC,,,

## 2019-01-30 PROCEDURE — 99499 NO LOS: ICD-10-PCS | Mod: S$GLB,,, | Performed by: SURGERY

## 2019-01-30 PROCEDURE — 99999 PR PBB SHADOW E&M-EST. PATIENT-LVL III: CPT | Mod: PBBFAC,,,

## 2019-01-30 NOTE — PROGRESS NOTES
CHIEF COMPLAINT: Type 2 Diabetes     HPI:      Patient is a 68 y.o. female with a diagnosis of ESLD, listed for liver transplant with meld 23 who underwent live transplant on 10/5/18.  Back to OR on 10/6/18 for ex lap.  Admitted 10/1/18 for hyponatremia s/p paracentesis.  Personal has known diagnosis of diabetes, endocrine consulted for diabetes management.     Post-operative course complicated by ESBL Klebsiella pneumoniae in urine (in contact isolation), peritransplant ascites, worsening renal function (required temporary dialysis), anemia (multiple blood transfusions), and possible GI bleed. Hospitalized for 4 months.    Diagnosed with t2dm x 6 years ago.    Pt is currently off prednisone.  Pt is traveling back to [place] on [place] 2/3.     Pt speaks Portuguese, needs .   and family members present during visit.    Since then, doing much better. Eats 3 meals, snacks on fruits here and there in between, snack at bedtime-slice a bread with 1 other item.  Recent education visit for meter training, review.     Lab Results   Component Value Date    HGBA1C 5.5 01/07/2019     PREVIOUS DIABETES MEDICATIONS TRIED  novolog  metformin    CURRENT DIABETIC MEDS: Levemir 8 units at night, novolog 5-6-6 units w/ meals plus scale 180-230+1 etc    Pt is monitoring blood glucose readings 4 times a day.  Needs ~100 strips per month related to fluctuations with blood glucose reading, a1c trends, and activity level.              Last Podiatry Exam: none    REVIEW OF SYSTEMS  General: no weakness, fatigue, + weight changes.   Eyes: no double or blurred vision, eye pain, or redness; Last Eye Exam=> 6mos .   Cardiovascular: no chest pain, palpitations, + edema, or murmurs.   Respiratory: no cough or dyspnea.   GI: no heartburn, nausea, or changes in bowel patterns; good appetite.   Skin: no rashes, dryness, itching, or reactions at insulin injection sites.  Neuro: + numbness, tingling, tremors, or vertigo. (R) lower  "side pain  Endocrine: no polyuria, polydipsia, polyphagia, heat or cold intolerance.     Vital Signs  /76 (BP Location: Right arm, Patient Position: Sitting, BP Method: Medium (Manual))   Pulse 70   Ht 5' 5" (1.651 m)   Wt 55.2 kg (121 lb 11.1 oz)   LMP  (LMP Unknown)   BMI 20.25 kg/m²     Hemoglobin A1C   Date Value Ref Range Status   01/07/2019 5.5 4.0 - 5.6 % Final     Comment:     ADA Screening Guidelines:  5.7-6.4%  Consistent with prediabetes  >or=6.5%  Consistent with diabetes  High levels of fetal hemoglobin interfere with the HbA1C  assay. Heterozygous hemoglobin variants (HbS, HgC, etc)do  not significantly interfere with this assay.   However, presence of multiple variants may affect accuracy.     10/04/2018 6.0 (H) 4.0 - 5.6 % Final     Comment:     ADA Screening Guidelines:  5.7-6.4%  Consistent with prediabetes  >or=6.5%  Consistent with diabetes  High levels of fetal hemoglobin interfere with the HbA1C  assay. Heterozygous hemoglobin variants (HbS, HgC, etc)do  not significantly interfere with this assay.   However, presence of multiple variants may affect accuracy.     08/16/2018 6.8 (H) 4.0 - 5.6 % Final     Comment:     ADA Screening Guidelines:  5.7-6.4%  Consistent with prediabetes  >or=6.5%  Consistent with diabetes  High levels of fetal hemoglobin interfere with the HbA1C  assay. Heterozygous hemoglobin variants (HbS, HgC, etc)do  not significantly interfere with this assay.   However, presence of multiple variants may affect accuracy.          Chemistry        Component Value Date/Time     01/28/2019 0725    K 4.2 01/28/2019 0725     01/28/2019 0725    CO2 29 01/28/2019 0725    BUN 58 (H) 01/28/2019 0725    CREATININE 1.4 01/28/2019 0725     (H) 01/28/2019 0725        Component Value Date/Time    CALCIUM 9.9 01/28/2019 0725    ALKPHOS 158 (H) 01/28/2019 0725    AST 28 01/28/2019 0725    ALT 33 01/28/2019 0725    BILITOT 1.3 (H) 01/28/2019 0725    ESTGFRAFRICA 44.2 " (A) 01/28/2019 0725    EGFRNONAA 38.4 (A) 01/28/2019 0725           Lab Results   Component Value Date    TSH 4.113 (H) 08/14/2018      No results found for: CHOL  No results found for: HDL  No results found for: LDLCALC  No results found for: TRIG  No results found for: CHOLHDL      PHYSICAL EXAMINATION  Constitutional: Appears well, no distress  Neck: Supple, trachea midline.   Respiratory: No wheezes, even and unlabored.  Cardiovascular: RRR;+ BLE edema-improved.   Lymph: deferred  Skin: warm and dry; no injection site reactions, no acanthosis nigracans observed.  Neuro:patient alert and cooperative, normal affect; uses walker.    Diabetes Foot Exam:   Deferred       Assessment/Plan  1. Type 2 diabetes mellitus with diabetic polyneuropathy, with long-term current use of insulin  insulin aspart U-100 (NOVOLOG FLEXPEN U-100 INSULIN) 100 unit/mL InPn pen    Print rx for lancets, give pen needles  Cut back levemir to 6 units at night  novolog change to 5-8-8 units w/ scale 180-230+1, etc    rx given for all above 2 months    Option soon : tradjenta or januvia 50 mg daily    Glipizide 2.5 mg xr     Will be able to stop levemir soon-in next 2-4 weeks    F/u via LTS   2. Prophylactic immunotherapy  Managed per LTS   3. Liver transplanted  Managed per LTS   4. Long-term use of immunosuppressant medication  See above   5. 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 Physical deconditioning  Improving, f/u with PCP in [place]      FOLLOW UP  No f/u   Defer to PCP

## 2019-01-31 ENCOUNTER — TELEPHONE (OUTPATIENT)
Dept: TRANSPLANT | Facility: CLINIC | Age: 69
End: 2019-01-31

## 2019-01-31 ENCOUNTER — LAB VISIT (OUTPATIENT)
Dept: LAB | Facility: HOSPITAL | Age: 69
End: 2019-01-31
Attending: SURGERY
Payer: COMMERCIAL

## 2019-01-31 DIAGNOSIS — Z94.4 STATUS POST LIVER TRANSPLANT: ICD-10-CM

## 2019-01-31 PROBLEM — G89.4 CHRONIC PAIN SYNDROME: Status: ACTIVE | Noted: 2019-01-31

## 2019-01-31 PROBLEM — R18.8 OTHER ASCITES: Status: RESOLVED | Noted: 2018-08-30 | Resolved: 2019-01-31

## 2019-01-31 PROBLEM — T81.89XA DELAYED SURGICAL WOUND HEALING: Status: RESOLVED | Noted: 2018-11-18 | Resolved: 2019-01-31

## 2019-01-31 LAB
ALBUMIN SERPL BCP-MCNC: 3.3 G/DL
ALP SERPL-CCNC: 143 U/L
ALT SERPL W/O P-5'-P-CCNC: 33 U/L
ANION GAP SERPL CALC-SCNC: 12 MMOL/L
AST SERPL-CCNC: 28 U/L
BASOPHILS # BLD AUTO: 0.02 K/UL
BASOPHILS NFR BLD: 0.7 %
BILIRUB DIRECT SERPL-MCNC: 0.6 MG/DL
BILIRUB SERPL-MCNC: 1.3 MG/DL
BUN SERPL-MCNC: 64 MG/DL
CALCIUM SERPL-MCNC: 9.6 MG/DL
CHLORIDE SERPL-SCNC: 102 MMOL/L
CO2 SERPL-SCNC: 25 MMOL/L
CREAT SERPL-MCNC: 1.5 MG/DL
DIFFERENTIAL METHOD: ABNORMAL
EOSINOPHIL # BLD AUTO: 0.1 K/UL
EOSINOPHIL NFR BLD: 1.8 %
ERYTHROCYTE [DISTWIDTH] IN BLOOD BY AUTOMATED COUNT: 15.6 %
EST. GFR  (AFRICAN AMERICAN): 40.7 ML/MIN/1.73 M^2
EST. GFR  (NON AFRICAN AMERICAN): 35.3 ML/MIN/1.73 M^2
GLUCOSE SERPL-MCNC: 253 MG/DL
HCT VFR BLD AUTO: 24.8 %
HGB BLD-MCNC: 8.1 G/DL
IMM GRANULOCYTES # BLD AUTO: 0.02 K/UL
IMM GRANULOCYTES NFR BLD AUTO: 0.7 %
LYMPHOCYTES # BLD AUTO: 0.6 K/UL
LYMPHOCYTES NFR BLD: 20.7 %
MCH RBC QN AUTO: 31.8 PG
MCHC RBC AUTO-ENTMCNC: 32.7 G/DL
MCV RBC AUTO: 97 FL
MONOCYTES # BLD AUTO: 0.3 K/UL
MONOCYTES NFR BLD: 10 %
NEUTROPHILS # BLD AUTO: 1.8 K/UL
NEUTROPHILS NFR BLD: 66.1 %
NRBC BLD-RTO: 0 /100 WBC
PLATELET # BLD AUTO: 67 K/UL
PMV BLD AUTO: 10.1 FL
POTASSIUM SERPL-SCNC: 4 MMOL/L
PROT SERPL-MCNC: 6.4 G/DL
RBC # BLD AUTO: 2.55 M/UL
SODIUM SERPL-SCNC: 139 MMOL/L
TACROLIMUS BLD-MCNC: 5.1 NG/ML
WBC # BLD AUTO: 2.71 K/UL

## 2019-01-31 PROCEDURE — 85025 COMPLETE CBC W/AUTO DIFF WBC: CPT

## 2019-01-31 PROCEDURE — 80053 COMPREHEN METABOLIC PANEL: CPT

## 2019-01-31 PROCEDURE — 82248 BILIRUBIN DIRECT: CPT

## 2019-01-31 PROCEDURE — 36415 COLL VENOUS BLD VENIPUNCTURE: CPT

## 2019-01-31 PROCEDURE — 80197 ASSAY OF TACROLIMUS: CPT

## 2019-01-31 NOTE — TELEPHONE ENCOUNTER
----- Message from Gigi Sprague MD sent at 1/31/2019  2:14 PM CST -----  Results ok. No action needed

## 2019-01-31 NOTE — TELEPHONE ENCOUNTER
Pt advised of stable labs and that no medication changes needed per Dr. Sprague.  Repeat labs due 2/4/19.    Pt advised with assistance from Jennifer in [employer].

## 2019-02-01 ENCOUNTER — OFFICE VISIT (OUTPATIENT)
Dept: ENDOCRINOLOGY | Facility: CLINIC | Age: 69
End: 2019-02-01
Payer: COMMERCIAL

## 2019-02-01 ENCOUNTER — TELEPHONE (OUTPATIENT)
Dept: TRANSPLANT | Facility: CLINIC | Age: 69
End: 2019-02-01

## 2019-02-01 VITALS
DIASTOLIC BLOOD PRESSURE: 76 MMHG | HEIGHT: 65 IN | WEIGHT: 121.69 LBS | BODY MASS INDEX: 20.28 KG/M2 | SYSTOLIC BLOOD PRESSURE: 122 MMHG | HEART RATE: 70 BPM

## 2019-02-01 DIAGNOSIS — B18.0 CHRONIC HEPATITIS B WITH DELTA AGENT WITH CIRRHOSIS: ICD-10-CM

## 2019-02-01 DIAGNOSIS — Z79.4 TYPE 2 DIABETES MELLITUS WITH DIABETIC POLYNEUROPATHY, WITH LONG-TERM CURRENT USE OF INSULIN: Primary | ICD-10-CM

## 2019-02-01 DIAGNOSIS — Z79.60 LONG-TERM USE OF IMMUNOSUPPRESSANT MEDICATION: ICD-10-CM

## 2019-02-01 DIAGNOSIS — E11.42 TYPE 2 DIABETES MELLITUS WITH DIABETIC POLYNEUROPATHY, WITH LONG-TERM CURRENT USE OF INSULIN: Primary | ICD-10-CM

## 2019-02-01 DIAGNOSIS — Z29.89 PROPHYLACTIC IMMUNOTHERAPY: ICD-10-CM

## 2019-02-01 DIAGNOSIS — Z94.4 STATUS POST LIVER TRANSPLANT: ICD-10-CM

## 2019-02-01 DIAGNOSIS — K74.60 CHRONIC HEPATITIS B WITH DELTA AGENT WITH CIRRHOSIS: ICD-10-CM

## 2019-02-01 DIAGNOSIS — R53.81 PHYSICAL DECONDITIONING: ICD-10-CM

## 2019-02-01 DIAGNOSIS — G89.29 CHRONIC ANKLE PAIN, UNSPECIFIED LATERALITY: Primary | ICD-10-CM

## 2019-02-01 DIAGNOSIS — M25.579 CHRONIC ANKLE PAIN, UNSPECIFIED LATERALITY: Primary | ICD-10-CM

## 2019-02-01 DIAGNOSIS — Z94.4 LIVER TRANSPLANTED: ICD-10-CM

## 2019-02-01 PROCEDURE — 99213 OFFICE O/P EST LOW 20 MIN: CPT | Mod: S$GLB,,, | Performed by: NURSE PRACTITIONER

## 2019-02-01 PROCEDURE — 99999 PR PBB SHADOW E&M-EST. PATIENT-LVL IV: ICD-10-PCS | Mod: PBBFAC,,, | Performed by: NURSE PRACTITIONER

## 2019-02-01 PROCEDURE — 99999 PR PBB SHADOW E&M-EST. PATIENT-LVL IV: CPT | Mod: PBBFAC,,, | Performed by: NURSE PRACTITIONER

## 2019-02-01 PROCEDURE — 99213 PR OFFICE/OUTPT VISIT, EST, LEVL III, 20-29 MIN: ICD-10-PCS | Mod: S$GLB,,, | Performed by: NURSE PRACTITIONER

## 2019-02-01 PROCEDURE — 99214 OFFICE O/P EST MOD 30 MIN: CPT | Mod: PBBFAC | Performed by: NURSE PRACTITIONER

## 2019-02-01 RX ORDER — FUROSEMIDE 20 MG/1
40 TABLET ORAL DAILY
Qty: 60 TABLET | Refills: 6 | Status: SHIPPED | OUTPATIENT
Start: 2019-02-01

## 2019-02-01 RX ORDER — INSULIN ASPART 100 [IU]/ML
INJECTION, SOLUTION INTRAVENOUS; SUBCUTANEOUS
Qty: 15 ML | Refills: 1 | Status: SHIPPED | OUTPATIENT
Start: 2019-02-01

## 2019-02-01 RX ORDER — LIDOCAINE 50 MG/G
OINTMENT TOPICAL
Qty: 35.44 G | Refills: 0 | Status: SHIPPED | OUTPATIENT
Start: 2019-02-01

## 2019-02-01 RX ORDER — BLOOD-GLUCOSE CONTROL, NORMAL
EACH MISCELLANEOUS
Qty: 150 EACH | Refills: 2 | Status: SHIPPED | OUTPATIENT
Start: 2019-02-01

## 2019-02-01 NOTE — LETTER
2019    Scarlett Reddy  [facility]  [facility]  [address]  [facility] [address]  Phone: [phone] To Whom it May Concern:    Scarlett Reddy  1950 received a liver transplant 10/05/2018 at [facility].  She has been under our care since.  At this time, she is stable and is discharged to return home to [facility].     Ms. Reddy will require monthly infusions of HBIG to prevent recurrent infection and liver dysfunction. She will also require frequent labs draws.      We request that Ms. Reddy return at 1 year post transplant for follow-up or sooner if any transplant issues arise.    Please do not hesitate to contact our office with any questions or concerns.    Sincerely,      Jerel Worrell MD  Transplant Hepatologist

## 2019-02-01 NOTE — LETTER
2019    Scarlett Reddy  [facility]  [facility]             [facility] - Liver Transplant  [address]  [facility] [address]  Phone: [phone] To Whom it May Concern:    Scarlett Reddy  1950 received a liver transplant on 10/5/2018 at [facility]. Ms. Reddy has been under my care since.  At this time, she is stable and is discharged to return home to [facility].    Ms. Reddy will be traveling with multiple medications for transplant. These medications are life sustaining and must travel with her at all times.     Please contact us with any questions or concerns.    Sincerely,      Anaid Beltrán MD  Transplant Hepatologist

## 2019-02-01 NOTE — TELEPHONE ENCOUNTER
Letters for discharge drafted and sent to Dr. Worrell for review/signature.    Transplant protocols also sent.

## 2019-02-02 ENCOUNTER — NURSE TRIAGE (OUTPATIENT)
Dept: ADMINISTRATIVE | Facility: CLINIC | Age: 69
End: 2019-02-02

## 2019-02-02 NOTE — TELEPHONE ENCOUNTER
Reason for Disposition   [1] Prescription not at pharmacy AND [2] was prescribed today by PCP    Protocols used: ST MEDICATION QUESTION CALL-A-    Liver Txp: 10/5/18  BPA: 9, 16        Dunia () calling regarding Pt and family is @ [facility] and Staff Members will not release Pt's medication.  Pt is flying back home tomorrow and need medications.  Paged and called On Call (NED Woodard MD); No answer from MD.  Called Pharmacy/Spoke with Ness (Pharmacy)-they gave Pt her medication and will follow up with Taina to update Pt's medication paper.

## 2019-02-04 ENCOUNTER — TELEPHONE (OUTPATIENT)
Dept: TRANSPLANT | Facility: CLINIC | Age: 69
End: 2019-02-04

## 2019-02-04 NOTE — TELEPHONE ENCOUNTER
Per Jennifer, pt was able to pick-up medications and returned home to [facility] yesterday.

## 2019-04-27 ENCOUNTER — TELEPHONE (OUTPATIENT)
Dept: ENDOSCOPY | Facility: HOSPITAL | Age: 69
End: 2019-04-27

## 2019-09-28 NOTE — ASSESSMENT & PLAN NOTE
Patient:   SIMIN MCGEE            MRN: [id number]            FIN: [id number]               Age:   60 years     Sex:  FEMALE     :  54   Associated Diagnoses:   None   Author:   MESSI PACE      HOSPITALIST DISCHARGE SUMMARY    ADMISSION DATE:  17        DISCHARGE DATE:   17    PRIMARY CARE PHYSICIAN:   Reanna Andre    CONSULTANTS:  Colorectal surgery: Dr. Wolf  GI: Dr. Moody    REASON FOR ADMISSION:  bloody bowel movements    HISTORY OF PRESENT ILLNESS:   60-year-old woman with a history of colon cancer status post laparoscopic LAR in 2012, severe aortic stenosis status post aortic valve replacement with [employer]ordin trifecta bioprosthetic valve, who recently had a colonoscopy done by Dr. Wolf on 2017 showing nonthrombosed internal hemorrhoids, patent end-to-end colorectal anastomosis, and a 5 mm polyp that was sessile in the ascending colon which was removed as well as a 5 mm sessile polyp in the sigmoid colon.    Since her colonoscopy on 2017, she had not had any noticeable bleeding until this past Monday, 2 days ago. Since then, she has had episodes of bright red blood per rectum, but also with hard black stools as well. Also since her colonoscopy, she had blood drawn, which she says was just routine, on 2017 with a hemoglobin of 6.8, and then 2017 with a hemoglobin of 6.4. Sometime following that, she did receive a unit of blood and was given a referral to GI..      HOSPITAL COURSE:   #Acute blood loss anemia– she had a colonoscopy on 2017 and 2 polyps were removed at that time.  However she had no noticeable bleeding until only 2 days prior to presentation. Labs on 2017 showed a ferritin level of only 6 and percent iron saturation of only 4, suggesting likely more chronic blood loss rather than more acute, given iron stores depleted. She did receive one unit of prbc after her hgb of 6.4 on  and on arrival her hgb has remained >7 so  - continue home regimen.  - endocrine consulted.  -Pt off insulin drip at this time.  Apprec endo recs.      it has at least been stable for 2-3 weeks. Patient was given iron pills by Dr. Norris, whom I spoke with but she has not yet taken it. This was reinforced to her and to her family that she take it. She has scheduled blood checks with Dr. Norris as an outpatient.   Dr. Moody did EGD/colonsocopy on 17 which showed an AVM with stigmata of recent bleeding which was then treated with APC. Ok to resume aspirin, but Dr. Moody does also recommend a capsule endoscopy. Patient can get this scheduled Dr. Moody's office, and Dr. Moody has notified his office to expect a call to set up outpatient capsule endoscopy. No indication for ppi upon discharge.     #Hypertension–on metoprolol succinate ER 25 mg daily  #History of severe aortic stenosis status post aortic valve replacement with [employer]ordin trifecta bioprosthetic valve in 2013  -ok to continue asa per gi.     #Hyperlipidemia–restart atorvastatin 20mg qday     PCP: Reanna Andre, will notify  CODE STATUS: Full  DVT prophylaxis: SCDs, holding chemical prophylaxis given concern for GI bleed  Immunizations: Offer Pneumovax if indicated, RN to screen          DISCHARGE EXAM:  I personally saw and examined the patient today.   GEN: alert, oriented, in NAD  CV: RRR, no m/r/g  Lungs: CTAB     PERTINENT LABS/IMAGIN/29/17: colonoscopy  Findings:       The terminal ileum appeared normal.       A single small angioectasia with stigmata of recent bleeding was found       in the cecum. Coagulation for tissue destruction using argon plasma at       0.5 liters/minute and 20 ballesteros was successful.       A small scar was found in the rectum. The scar tissue was healthy in       appearance.       There was evidence of a prior end-to-end colo-colonic anastomosis in the       rectum. This was patent and was characterized by healthy appearing       mucosa. The anastomosis was traversed.  Post-Op Diagnosis:       - The examined portion of the ileum was normal.       - A  single recently bleeding colonic angioectasia. Treated with argon       plasma coagulation (APC).       - Scar in the rectum.       - Patent end-to-end colo-colonic anastomosis, characterized by healthy       appearing mucosa.       - No specimens collected.       - Specimens were not removed  Recommendation:       - Patient has a contact number available for emergencies. The signs and       symptoms of potential delayed complications were discussed with the       patient. Return to normal activities tomorrow. Written discharge       instructions were provided to the patient.       - Resume regular diet.       - Continue present medications. Add oral iron therapy.       - Repeat colonoscopy in 5 years for surveillance.       - To visualize the small bowel, perform video capsule endoscopy in 2       weeks.      6/29/17: EGD  Findings:       The examined esophagus was normal.       Patchy mildly erythematous mucosa without bleeding was found in the       gastric body and in the gastric antrum. Biopsies were taken with a cold       forceps for histology.       The examined duodenum was normal. Biopsies were taken with a cold       forceps for histology.  Post-Op Diagnosis:       - Normal esophagus.       - Erythematous mucosa in the gastric body and antrum. Biopsied.       - Normal examined duodenum. Biopsied.  Recommendation:       - Await pathology results.       - Patient has a contact number available for emergencies. The signs and       symptoms of potential delayed complications were discussed with the       patient. Return to normal activities tomorrow. Written discharge       instructions were provided to the patient.       - Resume previous diet.       - Continue present medications.      CONDITION OF PATIENT ON DISCHARGE:  Stable    The patient is a non-smoker       DISCHARGE MEDICATION LIST   Allergies: No known allergies     MEDICATION  DOSE  ROUTE  FREQUENCY  SPECIAL INSTRUCTIONS   aspirin (aspirin 81 mg  oral tablet)  81 mg=1 tab  Oral  Daily     ferrous sulfate (ferrous sulfate oral 325 mg tablet [65 mg iron] (Feosol))  325 mg  Oral  Three times daily, with mealsFor 30 days  **Prescription electronically sent to Pharmacy:   metoprolol (Metoprolol Succinate ER 25 mg oral tablet, extended release)  25 mg=1 tab  Oral  Daily       DISCHARGE INSTRUCTIONS:   Patient to follow-up with   Dr. Moody's office for outpatient capsule endoscopy  Dr. Jes Norris for already scheduled blood checks    Discussed with Dr. Andre    PENDING RESULTS:     I spent 40 minutes on discharge.     [phone]              Electronically Signed On 06/30/2017 06:46  __________________________________________________   MESSI PACE

## 2019-11-15 NOTE — PROGRESS NOTES
"[facility]  Endocrinology  Progress Note    Admit Date: 10/1/2018     Reason for Consult: Management of type 2 DM, Hyperglycemia      Surgical Procedure and Date: Liver transplant 10/5/18, Ex lap 10/6/18     Diabetes diagnosis year: 2013     Home Diabetes Medications:  Lantus 18 units HS and novolog 17 units with meals plus correction scale (150-200 +1)        Lab Results   Component Value Date     HGBA1C 6.8 (H) 08/16/2018        How often checking glucose at home? 3-4   BG readings on regimen: 120-150.  Post prandial readings as high as upper 200s  Hypoglycemia on the regimen? yes, none recently   Missed doses on regimen?  No     Diabetes Complications include:     Diabetic peripheral neuropathy      Complicating diabetes co morbidities:   CIRRHOSIS        HPI:  Patient is a 68 y.o. female with a diagnosis of ESLD, listed for liver transplant with meld 23 who underwent live transplant on 10/5/18.  Back to OR on 10/6/18 for ex lap.  Admitted 10/1/18 for hyponatremia s/p paracentesis.  Personal has known diagnosis of diabetes, endocrine consulted for diabetes management.    Post-operative course complicated by ESBL Klebsiella pneumoniae in urine (in contact isolation), peritransplant ascites, worsening renal function (required temporary dialysis), anemia (multiple blood transfusions), and possible GI bleed.     Interval HPI:   Overnight events: Remains in TSU. BG continue to be variable and outside of desired goals. Diet has been reduced to NPO in preporation for upcoming procedure.  Eating:   NPO  Nausea: No  Hypoglycemia and intervention: No  Fever: No  TPN and/or TF: Yes  If yes, type of TF/TPN and rate: yes, 50 cc/hr    BP (!) 109/55 (BP Location: Left arm, Patient Position: Sitting)   Pulse 98   Temp 98.2 °F (36.8 °C) (Oral)   Resp 18   Ht 5' 3" (1.6 m)   Wt 55.1 kg (121 lb 7.6 oz)   LMP  (LMP Unknown)   SpO2 100%   Breastfeeding? No   BMI 21.52 kg/m²      Labs Reviewed and Include  "   Recent Labs   Lab 12/10/18  0420   *   CALCIUM 8.6*   ALBUMIN 2.9*   PROT 5.2*   *   K 4.5   CO2 24      BUN 44*   CREATININE 1.0   ALKPHOS 209*   ALT 20   AST 18   BILITOT 1.5*     Lab Results   Component Value Date    WBC 3.17 (L) 12/10/2018    HGB 8.6 (L) 12/10/2018    HCT 27.5 (L) 12/10/2018    MCV 93 12/10/2018     (L) 12/10/2018     No results for input(s): TSH, FREET4 in the last 168 hours.  Lab Results   Component Value Date    HGBA1C 6.0 (H) 10/04/2018       Nutritional status:   Body mass index is 21.52 kg/m².  Lab Results   Component Value Date    ALBUMIN 2.9 (L) 12/10/2018    ALBUMIN 2.9 (L) 12/09/2018    ALBUMIN 2.9 (L) 12/08/2018     Lab Results   Component Value Date    PREALBUMIN 13 (L) 12/10/2018    PREALBUMIN 15 (L) 11/03/2018    PREALBUMIN 12 (L) 09/17/2018       Estimated Creatinine Clearance: 43.9 mL/min (based on SCr of 1 mg/dL).    Accu-Checks  Recent Labs     12/09/18  1539 12/09/18  1744 12/09/18  2107 12/09/18  2133 12/10/18  0000 12/10/18  0438 12/10/18  0814 12/10/18  0858 12/10/18  1114 12/10/18  1359   POCTGLUCOSE 374* 156* 77 98 248* 413* 462* 441* 342* 356*       Current Medications and/or Treatments Impacting Glycemic Control  Immunotherapy:    Immunosuppressants         Stop Route Frequency     tacrolimus capsule 1 mg      -- Oral 2 times daily        Steroids:   Hormones (From admission, onward)    Start     Stop Route Frequency Ordered    12/03/18 1400  octreotide injection 50 mcg      -- SubQ Every 8 hours 12/03/18 1116    10/17/18 0945  predniSONE tablet 15 mg      10/24 0859 Oral Daily 10/17/18 0943        Pressors:    Autonomic Drugs (From admission, onward)    Start     Stop Route Frequency Ordered    10/06/18 1617  rocuronium (ZEMURON) 10 mg/mL injection     Comments:  Created by cabinet override    10/07 0429   10/06/18 1617        Hyperglycemia/Diabetes Medications:   Antihyperglycemics (From admission, onward)    Start     Stop Route Frequency  Ordered    12/10/18 1130  insulin aspart U-100 pen 4 Units      -- SubQ 3 times daily with meals 12/10/18 0832    12/09/18 1554  insulin regular injection 4 Units      -- IV Once 12/09/18 1554    12/05/18 1115  insulin regular (Humulin R) 100 Units in sodium chloride 0.9% 100 mL infusion      -- IV Continuous 12/05/18 1012    12/05/18 1111  insulin aspart U-100 pen 0-4 Units      -- SubQ As needed (PRN) 12/05/18 1012    10/07/18 1200  insulin regular (Humulin R) 100 Units in sodium chloride 0.9% 100 mL infusion      10/13 2100 IV Continuous 10/07/18 1055          ASSESSMENT and PLAN    * Liver transplanted    Managed per primary team  Avoid hypoglycemia    Recent Labs   Lab 12/10/18  0420   ALT 20   AST 18   ALKPHOS 209*   BILITOT 1.5*   PROT 5.2*   ALBUMIN 2.9*            Type 2 diabetes mellitus with diabetic polyneuropathy, with long-term current use of insulin    BG goal 140-180    Start transition drip at rate of 1.2 units/hr with Moderate SQ Correction scale.Continue novolog 4 units with meals.     POC glucose q4 hr given finger sticks - coordinate with meals during the day.    IV Bolus given in AM for Hyperglycemic correction.   Addendum: NPO. Discontinue Novolog 4 units with meals. Increase transition infusion to 1.5 units/hr. If BG remains elevated, will change to IIP with Q2 hour BG monitoring     Discharge planning:  TBD       Prophylactic immunotherapy    May increase insulin resistance.              Louie Merlos NP  Endocrinology  [facility]   normal...

## 2020-01-06 NOTE — PROGRESS NOTES
Paracentesis complete. Pt tolerated well. Total 5,100 ml peritoneal fluid removed. 150 ml Albumin 25% IV administered. Surgical glue to LLQ puncture site.     full ROM/cervical vertebral tenderness

## 2020-01-07 NOTE — NURSING
Notified SCOT Caldera NP of pt's 410 BG. She said to give scheduled insulin + sliding scale, 14U given. Will spot check prior to d/c  
Pt admitted to TSU , AAO. Daughter and spouse at bedside,   
Pt discharged via wheelchair by daughter & spouse. Pt left with personal belongings & discharge instructions. Discharge instructions were reviewed with pt via .  Pt denies pain or other need at time of discharge.  
Received report from VALENTINA Milton in IR stating they removed 6.9L during para today, 150 of albumin was given. Pt was said to have tolerated procedure well & that VSS. Pt arrived to floor complaining of having trouble breathing and chest pn. Upon taking her vitals, her BP was 98/51, O2 was 98%, RR 17, pulse 113, afebrile. HERBERT Valle NP notified. STAT ECG revealed sinus tach. Albumin ordered by NP  
5

## 2020-03-13 NOTE — NURSING
- Patient departed via stretcher to Ultrasound/IR department in stable condition.   -  is accompanying patient.  - Patient is going for a paracentesis with anticipated removal of up to 6 L.  - Will await patient's return to her room.    Addendum 1635: Patient returned to room in stable condition. Per IR report, 2.8 L was removed during paracentesis. Will continue to monitor and await further orders.   Please see message below from mom.  She wanted to give you update prior to appt today, 3:45 pm.     Forwarded to Dr. Brachmann for review/recommendation.

## 2020-03-26 NOTE — PROGRESS NOTES
[facility]  Hepatology  Progress Note    Patient Name: Scarlett Reddy  MRN: [id number]  Admission Date: 8/14/2018  Hospital Length of Stay: 3 days  Attending Provider: Cristobal Rodrigues MD   Primary Care Physician: Primary Doctor No  Principal Problem:Hepatic encephalopathy    Subjective:     Transplant status: Pre-transplant    HPI: Scarlett Reddy is a 69 y/o female with past medical history of Hep B and D cirrhosis, on entecavir with undetectable Hep B DNA, c/b EV with bleeding, HE, PV thrombosis, and refractory ascites, T2DM, who presented as a transfer from [facility] for evaluation of liver transplant.     Per records, the patient has been declining recently with hyponatremia due to diuretics (Na of 121), that improvement to 129 with holding diuresis, but now requiring paracentesis every 7-10 days.   She presented to a hospital in [facility] for progressive weakness, found to have worsening hyponatremia.  Per son report, the patient declined rapidly at the end of the trip to [place], and had to be transported by EMS.    Patient today states that she is feeling well .She states that she does not remember the details of her trip. She states that she had a paracentesis just before flying to [place]. She denies f/c, denies N/V, deines abdominal pain. Endorses abdominal swelling. Denies melena or hematemesis    The patient notes she was active and independent prior to these event. She denies alcohol use.     Labs on presentation are notable for PLT of 85, Na 124, Cr 1.4, ALKP 658, Tbili 2.1, AST//160     Interval History:   The patient states that she is feeling well today. She denies abdominal pain, denies change in bowel habits. Denies melena or hematochezia.   Patient is planned for an EGD today.    Current Facility-Administered Medications   Medication    0.9%  NaCl infusion (for blood administration)    dextrose 50% injection 12.5 g    dextrose 50% injection 25 g    entecavir tablet 0.5  mg    glucagon (human recombinant) injection 1 mg    glucose chewable tablet 16 g    glucose chewable tablet 24 g    insulin aspart U-100 pen 0-5 Units    insulin aspart U-100 pen 12 Units    insulin detemir U-100 pen 20 Units    lactulose 20 gram/30 mL solution Soln 30 g    midodrine tablet 10 mg    sodium bicarbonate tablet 1,300 mg       Objective:     Vital Signs (Most Recent):  Temp: 98.5 °F (36.9 °C) (08/17/18 1149)  Pulse: 94 (08/17/18 1149)  Resp: 20 (08/17/18 1149)  BP: (!) 90/51 (08/17/18 1149)  SpO2: 99 % (08/17/18 1149) Vital Signs (24h Range):  Temp:  [97.1 °F (36.2 °C)-98.6 °F (37 °C)] 98.5 °F (36.9 °C)  Pulse:  [81-98] 94  Resp:  [14-20] 20  SpO2:  [97 %-100 %] 99 %  BP: ()/(39-58) 90/51     Weight: 52.3 kg (115 lb 6.4 oz) (08/17/18 0600)  Body mass index is 19.2 kg/m².    Physical Exam   Constitutional: She is oriented to person, place, and time. She appears cachectic.   HENT:   Head: Normocephalic.   Eyes: Pupils are equal, round, and reactive to light. Scleral icterus is present.   Neck: Normal range of motion. Neck supple.   Cardiovascular: Normal rate and regular rhythm.   Pulmonary/Chest: Effort normal and breath sounds normal.   Abdominal: Soft. Bowel sounds are normal. She exhibits distension. There is no tenderness.   Musculoskeletal: Normal range of motion.   Neurological: She is alert and oriented to person, place, and time.   Skin: Skin is warm and dry.       MELD-Na score: 19 at 8/17/2018  3:33 AM  MELD score: 13 at 8/17/2018  3:33 AM  Calculated from:  Serum Creatinine: 1 mg/dL at 8/17/2018  3:33 AM  Serum Sodium: 130 mmol/L at 8/17/2018  3:33 AM  Total Bilirubin: 3.8 mg/dL at 8/17/2018  3:33 AM  INR(ratio): 1.1 at 8/17/2018  3:33 AM  Age: 68 years    Significant Labs:  CBC:   Recent Labs   Lab  08/17/18   0806   WBC  2.09*   RBC  2.85*   HGB  8.6*   HCT  25.5*   PLT  49*     BMP:   Recent Labs   Lab  08/17/18   0333   GLU  192*   NA  130*   K  3.9   CL  105   CO2  13*    BUN  50*   CREATININE  1.0   CALCIUM  9.0     CMP:   Recent Labs   Lab  08/17/18   0333   GLU  192*   CALCIUM  9.0   ALBUMIN  4.2   PROT  5.7*   NA  130*   K  3.9   CO2  13*   CL  105   BUN  50*   CREATININE  1.0   ALKPHOS  392*   ALT  92*   AST  105*   BILITOT  3.8*     Coagulation:   Recent Labs   Lab  08/17/18   0333   INR  1.1       Significant Imaging:  Labs: Reviewed  CT: Reviewed    Assessment/Plan:     * Hepatic encephalopathy    Scarlett Reddy is a 67 y/o female with past medical history of Hep B and D cirrhosis, on entecavir with undetectable Hep B DNA, c/b EV with bleeding, HE, PV thrombosis, and refractory ascites, T2DM, who presented as a transfer from [facility] for evaluation of liver transplant.     The patient had encephalopathy in route to [place]. She has been treated and now mental status improved. She underwent diagnostic paracentesis that was negative for SBP.  Undergoing evaluation for liver transplant currently..     Plan:  -Decompensated liver cirrhosis secondary to Hep B: Undergoing evaluation currently. Pending dobutamine stress test, and getting mammogram report. Getting entecavir for Hep B. DNA titer pending. PET pending.  -Continue with lactulose for HE.   -Anemia: significant drop from ~10 --> 6.8 unclear etiology. No signs of active GI bleeding. Had variceal bleeding ~2 months ago. Will have an EGD today.  -Hyponatremia: Agree with holding diuresis and fluid restriction.  -EV: On propranolol for secondary PPx. Holding in the setting of hypotension on admission.  -No history of HCC. AFP 2.9  -f/u daily CMP, INR.            Thank you for your consult. I will follow-up with patient. Please contact us if you have any additional questions.    John Somers MD  Hepatology  [facility]   You can access the FollowMyHealth Patient Portal offered by [facility] by registering at the following website: [email or web address]. By joining [employer]’s FollowMyHealth portal, you will also be able to view your health information using other applications (apps) compatible with our system.

## 2020-10-18 NOTE — ASSESSMENT & PLAN NOTE
- Na 126 on outpatient labs likely related to non-compliance and hyperglycemia  - Na 128 today.  - continue 1L fluid restriction, holding diuretics. Albumin 5% 250 ml. Continue to monitor.   - - -

## 2020-11-13 NOTE — PT/OT/SLP PROGRESS
----- Message from HEBERT Patel sent at 11/13/2020  2:15 PM CST -----  Please see Dr. Phillip's note. Patient should start Ciprofloxacin 500 BID for one week starting 4 days prior to surgery. Thanks! Patient's daughter Cady Maurer typically manages his care.   ----- Message -----  From: Rob Phillip MD  Sent: 11/11/2020   4:39 PM CST  To: HEBERT Patel    Despite his negative urine culture, he likely will have colonized urine with an indwelling catheter.  Please start him on Cipro 500 mg p.o. b.i.d. for 1 week, 1st dose four days before surgery.  Thanks.    Rob Phillip MD       Physical Therapy      Patient Name:  Scarlett Reddy   MRN:  [id number]    Patient not seen today secondary to (Pt off floor for EGD.). Will follow-up at next scheduled session as able.    Radha Nguyen, PT, DPT   11/5/2018  [phone]

## 2020-12-22 NOTE — PROGRESS NOTES
[facility]  Adult Nutrition  Progress Note     SUMMARY       Recommendations  Recommendation/Intervention:   1. Encourage increased PO intake and OS intake as tolerated   2. If TPN desired,  Recommend -95g AA, 280g Dextrose   - provides 95gm protein, 1332kcal +Lipds (GIR 2.5)   3. If TF desired, Recommend    -Novasource Renal at goal rate of 40mL/hr- provides 1920kcal, 87gm protein and 688mL free fluid.   -Glucerna 1.5 at goal rate of 55mL/hr - provides 1980kcal, 109gm and 1002mL free fluid.   4. Dietitian Following    Goals:   1. Patient to meet >85% EEN/EPN.   2. Promote nutrition related labs WNL  Nutrition Goal Status: progressing towards goal  Communication of RD Recs: (POC)    General Information Comments: Pt s/p OLTx 10/5. Language barrier. Tolerating Low Chol/Sat Fat, Low Potassium, Renal diet. Consuming ONS. Increased po intake since last Dietitian visit. Family and teams continues to provide encouragement with meals. NFPE completed 10/3/18.    Nutrition Discharge Planning: Post transplant nutrition education provided. Food safety/drug interactions emphasized. General healthy diet recommended. Dietitian name/contact information, education material left.  No other needs identified. Caregiver present.     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, DM  Interdisciplinary Rounds: attended    Nutrition Risk Screen  Nutrition Risk Screen: cultural or Evangelical food preferences    Nutrition/Diet History  Patient Reported Diet/Restrictions/Preferences: diabetic diet, low salt(Cultural preferences)  Food Preferences: Cultural preferences Noted  Do you have any cultural, spiritual, Evangelical conflicts, given your current situation?: none noted   Supplemental Drinks or Food Habits: Boost Plus, Ensure Plus(Strawberry)  Food Allergies:  "NKFA  Factors Affecting Nutritional Intake: abdominal distention, decreased appetite    Anthropometrics  Temp: 97.8 °F (36.6 °C)  Height Method: Stated  Height: 5' 3" (160 cm)  Height (inches): 63 in  Weight Method: Bed Scale  Weight: 72.2 kg (159 lb 3.2 oz)  Weight (lb): 159.2 lb  Ideal Body Weight (IBW), Female: 115 lb  % Ideal Body Weight, Female (lb): 101.8 lb  BMI (Calculated): 20.8  BMI Grade: 18.5-24.9 - normal  Weight Loss: unintentional  Weight Loss Since Admission: 16 lb 0.4 oz     Lab/Procedures/Meds  Labs: Reviewed    (L) 10/24/2018    K 4.2 10/24/2018    BUN 87 (H) 10/24/2018    CREATININE 2.1 (H) 10/24/2018    CALCIUM 8.4 (L) 10/24/2018    PHOS 3.7 10/24/2018    MG 1.6 10/24/2018    EGFRNONAA 23.5 (A) 10/24/2018    ALBUMIN 2.6 (L) 10/24/2018      ALT 21 10/24/2018    AST 25 10/24/2018    ALKPHOS 110 10/24/2018     Meds: Reviewed  Scheduled Meds:   albumin human 25%  25 g Intravenous BID    amoxicillin  500 mg Oral Q12H    entecavir  0.5 mg Oral Q72H    ergocalciferol  50,000 Units Oral Q7 Days    fluconazole  200 mg Oral Daily    furosemide  40 mg Intravenous BID    insulin aspart U-100  14 Units Subcutaneous TIDWM    levoFLOXacin  250 mg Oral Daily    magnesium oxide  800 mg Oral BID    midodrine  15 mg Oral TID    mirtazapine  7.5 mg Oral QHS    mycophenolate  1,000 mg Oral BID    pantoprazole  40 mg Intravenous BID    predniSONE  10 mg Oral Daily     Physical Findings/Assessment  Overall Physical Appearance: loss of subcutaneous fat, loss of muscle mass  Oral/Mouth Cavity: tooth/teeth missing  Skin: intact, edema, jaundice    Estimated/Assessed Needs  Weight Used For Calorie Calculations: 72.2 kg (159 lb 2.8 oz)  Energy Calorie Requirements (kcal): 3738-6927  Energy Need Method: Kcal/kg(25-30 kcal/kg)  Protein Requirements: (1.2-1.4 gm/kg)  Weight Used For Protein Calculations: 72.2 kg (159 lb 2.8 oz)  Fluid Requirements (mL): 1 mL/kcal or per MD  Fluid Need Method: RDA " Method  RDA Method (mL): 1805     Nutrition Prescription Ordered  Current Diet Order: Regular,Low Chol/Sat Fat, Low Potassium, Renal  Nutrition Order Comments: -  Oral Nutrition Supplement: Boost Glucose Control    Evaluation of Received Nutrient/Fluid Intake in last 24h  I/O: -8.9L since 10/10/18  Comments: LBM 10/24/18  % Intake of Estimated Energy Needs: 25 - 50 %  % Meal Intake: 25 - 50 %    Nutrition Risk  Level of Risk/Frequency of Follow-up: low(1x week)     Assessment and Plan  NFPE completed 10/3/18  Severe malnutrition     Nutrition Problem  Severe Malnutrition in the context of Chronic Illness/Injury     Related to (etiology):  ESLD     Signs and Symptoms (as evidenced by):  Energy Intake: <75% of estimated energy requirement for 2 months  Body Fat Depletion: Severe depletion of orbitals and triceps   Muscle Mass Depletion: Severe depletion of temples, clavicle region and lower extremities   Weight Loss: 12.31% x 1 month     Nutrition Diagnosis Status:  Continues     Monitor and Evaluation  Food and Nutrient Intake: energy intake, food and beverage intake  Food and Nutrient Adminstration: diet order  Knowledge/Beliefs/Attitudes: food and nutrition knowledge/skill  Physical Activity and Function: nutrition-related ADLs and IADLs  Anthropometric Measurements: weight change, weight  Biochemical Data, Medical Tests and Procedures: electrolyte and renal panel, gastrointestinal profile, glucose/endocrine profile, inflammatory profile, lipid profile  Nutrition-Focused Physical Findings: overall appearance     Nutrition Follow-Up  RD Follow-up?: Yes         Yes

## 2021-01-08 ENCOUNTER — PATIENT MESSAGE (OUTPATIENT)
Dept: TRANSPLANT | Facility: CLINIC | Age: 71
End: 2021-01-08

## 2021-01-19 NOTE — ASSESSMENT & PLAN NOTE
- Started Na bicarb 1300 QID.  -CRRT 10/14  -SLED 10/17       Outpatient Medications Marked as Taking for the 1/19/21 encounter (Refill) with Bro Key MD   Medication Sig Dispense Refill   • escitalopram (LEXAPRO) 20 MG tablet Take 1 tablet by mouth daily. 30 tablet 3     Patient is requesting asap refill as he took the last of his medication this morning.    New pharmacy     Ok to leave detailed Message: No  Informed caller of refill policy- 24-48 hours: Yes  No call back needed unless nurse has questions.     Pharmacy: [id number] - [place], [place] - [address] AT [facility] [address]  [phone]

## 2022-05-11 ENCOUNTER — PATIENT MESSAGE (OUTPATIENT)
Dept: RESEARCH | Facility: CLINIC | Age: 72
End: 2022-05-11
Payer: COMMERCIAL

## 2022-05-31 NOTE — TELEPHONE ENCOUNTER
"Katerin called me to say patient's daughter "freaking out" because patient's blood sugar is almost 500 and nurse is not giving insulin and not feeding patient.   Asked can I please call and investigate.   "
2 days ago punched a wall

## 2022-06-13 NOTE — ASSESSMENT & PLAN NOTE
On TPN.    Graft Donor Site Bandage (Optional-Leave Blank If You Don't Want In Note): Steri-strips and a pressure bandage were applied to the donor site.

## 2022-07-08 NOTE — NURSING
Are you willing to prescribe this? Reviewed pt's discharge with pt's daughter and Landy on the phone to help interpret for patient. Pt's daughter verbalized understanding of all upcoming appointments and medications. PIV removed.

## 2022-08-17 NOTE — PLAN OF CARE
Problem: Patient Care Overview  Goal: Plan of Care Review  Outcome: Ongoing (interventions implemented as appropriate)  AAOX4.  VSS.  No complaints of pain.  Pt BLE are wrapped in ace bandages due to legs being very edematous.  Insulin drip is infusing continuously at 0.8 units/hr.  Accuchecks AC, HS, 0200.  Last BS was 272, covered with 2 units sliding scale.  Family is at the bedside.  Contact precautions maintained.  Bed is in lowest position, call bell is in reach, and pt instructed to call nurse when needing assistance.  Will continue to monitor.       Consent Type: Consent 1 (Standard)

## 2022-08-19 NOTE — ASSESSMENT & PLAN NOTE
- Na 119 on admit.  - Na was improving post transplant w HD.  - Na stable at this time.           declines

## 2022-10-03 NOTE — ASSESSMENT & PLAN NOTE
- See long term use immunosuppresion     Class III - visualization of the soft palate and the base of the uvula

## 2023-01-16 NOTE — ASSESSMENT & PLAN NOTE
- Started Na bicarb 1300 QID.  -CRRT 10/14  -SLED 10/17, 10/20       Detail Level: Generalized Spoke with patient and he states he has been having a lot of problems with eating lately and pain and just overall not feeling well. He is unable to drive and needs to find a ride to come in . Trying to work with his PCA to see when they can bring him in. He said that 1% is just not making the rash go away needs something stronger. He states he was up all last night throwing up after trying to eat.     Routing to provider to advise.   Candice Howard MA    Detail Level: Detailed Detail Level: Zone Winlevi Counseling:  I discussed with the patient the risks of topical clascoterone including but not limited to erythema, scaling, itching, and stinging. Patient voiced their understanding. Isotretinoin Pregnancy And Lactation Text: This medication is Pregnancy Category X and is considered extremely dangerous during pregnancy. It is unknown if it is excreted in breast milk. Tetracycline Counseling: Patient counseled regarding possible photosensitivity and increased risk for sunburn.  Patient instructed to avoid sunlight, if possible.  When exposed to sunlight, patients should wear protective clothing, sunglasses, and sunscreen.  The patient was instructed to call the office immediately if the following severe adverse effects occur:  hearing changes, easy bruising/bleeding, severe headache, or vision changes.  The patient verbalized understanding of the proper use and possible adverse effects of tetracycline.  All of the patient's questions and concerns were addressed. Patient understands to avoid pregnancy while on therapy due to potential birth defects. Topical Retinoid Pregnancy And Lactation Text: This medication is Pregnancy Category C. It is unknown if this medication is excreted in breast milk. Sarecycline Counseling: Patient advised regarding possible photosensitivity and discoloration of the teeth, skin, lips, tongue and gums.  Patient instructed to avoid sunlight, if possible.  When exposed to sunlight, patients should wear protective clothing, sunglasses, and sunscreen.  The patient was instructed to call the office immediately if the following severe adverse effects occur:  hearing changes, easy bruising/bleeding, severe headache, or vision changes.  The patient verbalized understanding of the proper use and possible adverse effects of sarecycline.  All of the patient's questions and concerns were addressed. Bactrim Counseling:  I discussed with the patient the risks of sulfa antibiotics including but not limited to GI upset, allergic reaction, drug rash, diarrhea, dizziness, photosensitivity, and yeast infections.  Rarely, more serious reactions can occur including but not limited to aplastic anemia, agranulocytosis, methemoglobinemia, blood dyscrasias, liver or kidney failure, lung infiltrates or desquamative/blistering drug rashes. Birth Control Pills Counseling: Birth Control Pill Counseling: I discussed with the patient the potential side effects of OCPs including but not limited to increased risk of stroke, heart attack, thrombophlebitis, deep venous thrombosis, hepatic adenomas, breast changes, GI upset, headaches, and depression.  The patient verbalized understanding of the proper use and possible adverse effects of OCPs. All of the patient's questions and concerns were addressed. Erythromycin Pregnancy And Lactation Text: This medication is Pregnancy Category B and is considered safe during pregnancy. It is also excreted in breast milk. Benzoyl Peroxide Pregnancy And Lactation Text: This medication is Pregnancy Category C. It is unknown if benzoyl peroxide is excreted in breast milk. Topical Sulfur Applications Counseling: Topical Sulfur Counseling: Patient counseled that this medication may cause skin irritation or allergic reactions.  In the event of skin irritation, the patient was advised to reduce the amount of the drug applied or use it less frequently.   The patient verbalized understanding of the proper use and possible adverse effects of topical sulfur application.  All of the patient's questions and concerns were addressed. Spironolactone Counseling: Patient advised regarding risks of diarrhea, abdominal pain, hyperkalemia, birth defects (for female patients), liver toxicity and renal toxicity. The patient may need blood work to monitor liver and kidney function and potassium levels while on therapy. The patient verbalized understanding of the proper use and possible adverse effects of spironolactone.  All of the patient's questions and concerns were addressed. Topical Clindamycin Counseling: Patient counseled that this medication may cause skin irritation or allergic reactions.  In the event of skin irritation, the patient was advised to reduce the amount of the drug applied or use it less frequently.   The patient verbalized understanding of the proper use and possible adverse effects of clindamycin.  All of the patient's questions and concerns were addressed. Doxycycline Pregnancy And Lactation Text: This medication is Pregnancy Category D and not consider safe during pregnancy. It is also excreted in breast milk but is considered safe for shorter treatment courses. Aklief Pregnancy And Lactation Text: It is unknown if this medication is safe to use during pregnancy.  It is unknown if this medication is excreted in breast milk.  Breastfeeding women should use the topical cream on the smallest area of the skin for the shortest time needed while breastfeeding.  Do not apply to nipple and areola. Use Enhanced Medication Counseling?: No Azithromycin Counseling:  I discussed with the patient the risks of azithromycin including but not limited to GI upset, allergic reaction, drug rash, diarrhea, and yeast infections. Azelaic Acid Pregnancy And Lactation Text: This medication is considered safe during pregnancy and breast feeding. High Dose Vitamin A Counseling: Side effects reviewed, pt to contact office should one occur. Dapsone Pregnancy And Lactation Text: This medication is Pregnancy Category C and is not considered safe during pregnancy or breast feeding. Tazorac Counseling:  Patient advised that medication is irritating and drying.  Patient may need to apply sparingly and wash off after an hour before eventually leaving it on overnight.  The patient verbalized understanding of the proper use and possible adverse effects of tazorac.  All of the patient's questions and concerns were addressed. Minocycline Counseling: Patient advised regarding possible photosensitivity and discoloration of the teeth, skin, lips, tongue and gums.  Patient instructed to avoid sunlight, if possible.  When exposed to sunlight, patients should wear protective clothing, sunglasses, and sunscreen.  The patient was instructed to call the office immediately if the following severe adverse effects occur:  hearing changes, easy bruising/bleeding, severe headache, or vision changes.  The patient verbalized understanding of the proper use and possible adverse effects of minocycline.  All of the patient's questions and concerns were addressed. Topical Retinoid counseling:  Patient advised to apply a pea-sized amount only at bedtime and wait 30 minutes after washing their face before applying.  If too drying, patient may add a non-comedogenic moisturizer. The patient verbalized understanding of the proper use and possible adverse effects of retinoids.  All of the patient's questions and concerns were addressed. Birth Control Pills Pregnancy And Lactation Text: This medication should be avoided if pregnant and for the first 30 days post-partum. Winlevi Pregnancy And Lactation Text: This medication is considered safe during pregnancy and breastfeeding. Spironolactone Pregnancy And Lactation Text: This medication can cause feminization of the male fetus and should be avoided during pregnancy. The active metabolite is also found in breast milk. Tetracycline Pregnancy And Lactation Text: This medication is Pregnancy Category D and not consider safe during pregnancy. It is also excreted in breast milk. Topical Sulfur Applications Pregnancy And Lactation Text: This medication is Pregnancy Category C and has an unknown safety profile during pregnancy. It is unknown if this topical medication is excreted in breast milk. Erythromycin Counseling:  I discussed with the patient the risks of erythromycin including but not limited to GI upset, allergic reaction, drug rash, diarrhea, increase in liver enzymes, and yeast infections. Benzoyl Peroxide Counseling: Patient counseled that medicine may cause skin irritation and bleach clothing.  In the event of skin irritation, the patient was advised to reduce the amount of the drug applied or use it less frequently.   The patient verbalized understanding of the proper use and possible adverse effects of benzoyl peroxide.  All of the patient's questions and concerns were addressed. Bactrim Pregnancy And Lactation Text: This medication is Pregnancy Category D and is known to cause fetal risk.  It is also excreted in breast milk. Isotretinoin Counseling: Patient should get monthly blood tests, not donate blood, not drive at night if vision affected, not share medication, and not undergo elective surgery for 6 months after tx completed. Side effects reviewed, pt to contact office should one occur. Azelaic Acid Counseling: Patient counseled that medicine may cause skin irritation and to avoid applying near the eyes.  In the event of skin irritation, the patient was advised to reduce the amount of the drug applied or use it less frequently.   The patient verbalized understanding of the proper use and possible adverse effects of azelaic acid.  All of the patient's questions and concerns were addressed. Azithromycin Pregnancy And Lactation Text: This medication is considered safe during pregnancy and is also secreted in breast milk. Topical Clindamycin Pregnancy And Lactation Text: This medication is Pregnancy Category B and is considered safe during pregnancy. It is unknown if it is excreted in breast milk. High Dose Vitamin A Pregnancy And Lactation Text: High dose vitamin A therapy is contraindicated during pregnancy and breast feeding. Tazorac Pregnancy And Lactation Text: This medication is not safe during pregnancy. It is unknown if this medication is excreted in breast milk. Dapsone Counseling: I discussed with the patient the risks of dapsone including but not limited to hemolytic anemia, agranulocytosis, rashes, methemoglobinemia, kidney failure, peripheral neuropathy, headaches, GI upset, and liver toxicity.  Patients who start dapsone require monitoring including baseline LFTs and weekly CBCs for the first month, then every month thereafter.  The patient verbalized understanding of the proper use and possible adverse effects of dapsone.  All of the patient's questions and concerns were addressed. Doxycycline Counseling:  Patient counseled regarding possible photosensitivity and increased risk for sunburn.  Patient instructed to avoid sunlight, if possible.  When exposed to sunlight, patients should wear protective clothing, sunglasses, and sunscreen.  The patient was instructed to call the office immediately if the following severe adverse effects occur:  hearing changes, easy bruising/bleeding, severe headache, or vision changes.  The patient verbalized understanding of the proper use and possible adverse effects of doxycycline.  All of the patient's questions and concerns were addressed. Aklief counseling:  Patient advised to apply a pea-sized amount only at bedtime and wait 30 minutes after washing their face before applying.  If too drying, patient may add a non-comedogenic moisturizer.  The most commonly reported side effects including irritation, redness, scaling, dryness, stinging, burning, itching, and increased risk of sunburn.  The patient verbalized understanding of the proper use and possible adverse effects of retinoids.  All of the patient's questions and concerns were addressed.

## 2023-01-17 NOTE — ASSESSMENT & PLAN NOTE
- Urine Cx 10/15 w/ Klebsiella pneumonia.  - Started Ciprofloxacin (10/17-10/19).   - Started on amoxicillin/levo for H. Pylori (completed abx 11/2).  - Pt asymptomatic at this time.     Diagnostic Evaluation Consultation  Crisis Assessment    Patient was assessed: Humberto  Patient location: [facility]  Was a release of information signed: No. Reason: No providers      Referral Data and Chief Complaint  Bernarda Lizama is a 25 year old, who uses she/her pronouns, and presents to the ED with family/friends. Patient is referred to the ED by family/friends. Patient is presenting to the ED for the following concerns: Patient was encouraged to present to the emergency room by her sister after she reported to her having intention to kill herself.      Informed Consent and Assessment Methods     Patient is her own guardian. Writer met with patient and explained the crisis assessment process, including applicable information disclosures and limits to confidentiality, assessed understanding of the process, and obtained consent to proceed with the assessment. Patient was observed to be able to participate in the assessment as evidenced by Willingness to engage with assessment. Assessment methods included conducting a formal interview with patient, review of medical records, collaboration with medical staff, and obtaining relevant collateral information from family and community providers when available..     Over the course of this crisis assessment provided reassurance and offered validation. Patient's response to interventions was receptive.     Summary of Patient Situation     Patient reports struggling with her mental health for a number of years with little improvement.  Patient moved in with her father 3 weeks ago after living with her twin sister who she reports as the only person aware of the extent of her mental struggles. Since her move she reports worsening suicidal ideation and implusivity.  She has suicidal thoughts chronically but has felt that they have been more intense lately due to her inability to rely on her coping skills.  Patient notes using marijuana to help escape her feelings but her  father does not allow smoking in his home. Alternatively patient would destroy her personal items to cope but since living in her father's home she has very few things that belong to her so she self harms.  She has been cutting or burning herself regularly and feels that this is her main source of relief.  Patient's notes using alcohol more regularly lately as her father is not as strict with alcohol use.  Patient reports driving in her car and having the urge to crash or drive off of a bridge.  For pt minor inconveniences trigger suicidal thoughts and negative impulses. Her previous suicide attempt was after a minor argument with her sister and she toke an overdose of Nyquil.  Patient expresses helplessness and hopelessness surrounding her state of being. Pt denies hallucinations but expresses feelings of paranoia. Pt reports that she often feels that people are out to get her when they do not agree with her. Pt reports anxiety and difficulty sleeping over the past few days.  Patient does not feel that she is able to change her mental state but presents because of the encouragement of her family.    During assessment patient was cooperative and engaged with writer.  At times patient had a [id number] attitude regarding the state of her mental health and her feelings of hopelessness.  Patient continues to endorse suicidal thoughts and reports impulsivity with her uncontrolled thoughts.  Patient is unable to engage in appropriate safety planning as she does not feel she has the appropriate insight in place.     Brief Psychosocial History     Pt lives with her father who she reports is strict and demanding. Pt had been living with her twin sister throughout adulthood but pt's mental health has taken a toll on her twin that she elected not to live with her any longer. She works from home as a  but has been performing poorly lately due to her mental health. Pt denies having any hobbies. Pt had a  DWI last year.    Significant Clinical History     Pt has a hx of depression and BPD. Pt notes that she has not been consistent with any mental health treatment including therapy or medication management. She discontinued medication stating that it was ineffective but was also not taking it consistently and was also using alcohol. Pt has engaged with DBT but has not completed the program. Pt was hospitalized for 1 night at [facility] following a suicide attempt in 2021. Currently pt has no treatment providers.       Collateral Information    Pt's mother was present during interview and she provided information along with pt. Mother expresses concern for pt's safety and belief that she is worsening. Mother expresses that pt has been impulsive and her behavior is risky. Pt has destroyed the television in her home when she was upset. Pt recently got stuck on the ice and out of frustration she got out of her car and punched the side mirror repeatedly until it broke of which provided her with some relief. Mother expresses that pt and her twin had a co-dependent relationship and the twin has helped pt hide her poor mental health habits but now that they are  pt has no one to buffer her. Mother is worried that pt will become upset with someone while she is in the community and potentially put them in danger as she rages easily. Mother worries that if pt is not hospitalized she will end her life and she never get treatment as she has not followed up with care appropriately in the past.      Risk Assessment  [place] Suicide Severity Rating Scale Full Clinical Version:1/16/23  Suicidal Ideation  1. Wish to be Dead (Lifetime): Yes  1. Wish to be Dead (Past 1 Month): Yes  2. Non-Specific Active Suicidal Thoughts (Lifetime): Yes  2. Non-Specific Active Suicidal Thoughts (Past 1 Month): Yes  3. Active Suicidal Ideation with any Methods (Not Plan) Without Intent to Act (Lifetime): Yes  3. Active Suicidal Ideation with any  Methods (Not Plan) Without Intent to Act (Past 1 Month): Yes  4. Active Suicidal Ideation with Some Intent to Act, Without Specific Plan (Lifetime): Yes  4. Active Suicidal Ideation with Some Intent to Act, Without Specific Plan (Past 1 Month): Yes  5. Active Suicidal Ideation with Specific Plan and Intent (Lifetime): Yes  5. Active Suicidal Ideation with Specific Plan and Intent (Past 1 Month): No  Intensity of Ideation  Most Severe Ideation Rating (Lifetime): 5  Most Severe Ideation Rating (Past 1 Month): 5  Frequency (Lifetime): Many times each day  Frequency (Past 1 Month): Many times each day  Duration (Lifetime): More than 8 hours/persistent or continuous  Duration (Past 1 Month): More than 8 hours/persistent or continuous  Controllability (Lifetime): Does not attempt to control thoughts  Controllability (Past 1 Month): Does not attempt to control thoughts  Deterrents (Lifetime): Uncertain that deterrents stopped you  Deterrents (Past 1 Month): Uncertain that deterrents stopped you  Reasons for Ideation (Lifetime): Completely to end or stop the pain (You couldn't go on living with the pain or how you were feeling)  Reasons for Ideation (Past 1 Month): Completely to end or stop the pain (You couldn't go on living with the pain or how you were feeling)  Suicidal Behavior  Actual Attempt (Lifetime): Yes  Total Number of Actual Attempts (Lifetime): 1  Actual Attempt (Past 3 Months): No  Has subject engaged in non-suicidal self-injurious behavior? (Lifetime): Yes  Has subject engaged in non-suicidal self-injurious behavior? (Past 3 Months): Yes  Interrupted Attempts (Lifetime): No  Aborted or Self-Interrupted Attempt (Lifetime): Yes  Total Number of Aborted or Self-Interrupted Attempts (Lifetime): 1  Aborted or Self-Interrupted Attempt (Past 3 Months): No  Preparatory Acts or Behavior (Lifetime): No  C-SSRS Risk (Lifetime/Recent)  Calculated C-SSRS Risk Score (Lifetime/Recent): High Risk    [place] Suicide  Severity Rating Scale Since Last Contact: na        Actual/Potential Lethality (Most Lethal Attempt)  Most Lethal Attempt Date: 09/01/21  Actual Lethality/Medical Damage Code (Most Lethal Attempt): Moderate physical damage, medical attention needed  Potential Lethality Code (Most Lethal Attempt): Behavior likely to result in injury but not likely to cause death       Validity of evaluation is not impacted by presenting factors during interview.   Comments regarding subjective versus objective responses to [place] tool: Tool appears to appropriately measure risk  Environmental or Psychosocial Events: threats to a prized relationship, loss of status/respect/rank, challenging interpersonal relationships, helplessness/hopelessness and impulsivity/recklessness  Chronic Risk Factors: history of suicide attempts (9/21), history of psychiatric hospitalization, parent divorce and personality disorders   Warning Signs: hopelessness, rage, anger, seeking revenge, acting reckless or engaging in risky activities, increasing substance use or abuse and dramatic changes in mood  Protective Factors: strong bond to family unit, community support, or employment  Interpretation of Risk Scoring, Risk Mitigation Interventions and Safety Plan:  Pt scores as a high risk for suicide which appears to be an appropriate rating. Pt has experienced major changes in her life that have altered her available supports. Pt's increased impulsivity and lability increase her risk. Pt is not able to make a safety plan and continues to endorse suicidal ideation.       Does the patient have thoughts of harming others? No     Is the patient engaging in sexually inappropriate behavior?  no        Current Substance Abuse     Is there recent substance abuse? Pt expresses using alcohol regularly lately but is not able to place a amount on her use. Pt denies drinking everyday to excess but notes drinking often.      Was a urine drug screen or blood alcohol  level obtained: No       Mental Status Exam     Affect: Flat   Appearance: Appropriate    Attention Span/Concentration: Attentive  Eye Contact: Engaged   Fund of Knowledge: Appropriate    Language /Speech Content: Fluent   Language /Speech Volume: Normal    Language /Speech Rate/Productions: Normal    Recent Memory: Intact   Remote Memory: Intact   Mood: Apathetic    Orientation to Person: Yes    Orientation to Place: Yes   Orientation to Time of Day: Yes    Orientation to Date: Yes    Situation (Do they understand why they are here?): Yes    Psychomotor Behavior: Normal    Thought Content: Suicidal   Thought Form: Goal Directed      History of commitment: No       Medication    Psychotropic medications: No  Medication changes made in the last two weeks: No       Current Care Team    Primary Care Provider: No  Psychiatrist: No  Therapist: No  : No     CTSS or ARMHS: No  ACT Team: No  Other: No      Diagnosis    296.32 (F33.1) Major Depressive Disorder, Recurrent Episode, Moderate _   301.83 (F60.3) Borderline Personality Disorder - by history     Clinical Summary and Substantiation of Recommendations    As patient continues to endorse suicidal ideation and her ongoing impulsive behaviors continue to place her at increased risk of harm she will be hospitalized for stabilization.  Patient is not currently engaging in any mental health treatment and will require medication management along with stabilization to encourage stability in the community.  Patient notes persistent suicidal ideation with recent impulsive actions which could lead to serious injury or death.  Patient is willing to engage in treatment but notes that she is unable to remain in safe in an outpatient setting at this time.  Admission to Inpatient Level of Care is indicated due to:    1. Patient risk of severity of behavioral health disorder is appropriate to proposed level of care as indicated by:    Imminent Risk of Harm: Current plan  for suicide or serious harm to self is present  And/or:  Behavioral health disorder is present and appropriate for inpatient care with both of the following:     Severe psychiatric, behavioral or other comorbid conditions are appropriate for management at inpatient mental health as indicated by at least one of the following:   o Impaired impulse control, judgement, or insight    Severe dysfunction in daily living is present as indicated by at least one of the following:   o Extreme deterioration in social interactions    2. Inpatient mental health services are necessary to meet patient needs and at least one of the following:  Specific condition related to admission diagnosis is present and judged likely to deteriorate in absence of treatment at proposed level of care    3. Situation and expectations are appropriate for inpatient care, as indicated by one of the following:   Voluntary treatment at lower level of care is not feasible    Disposition    Recommended disposition: Inpatient Mental Health       Reviewed case and recommendations with attending provider. Attending Name: Dr. Durand       Attending concurs with disposition: Yes       Patient concurs with disposition: Yes       Guardian concurs with disposition: NA      Final disposition: Inpatient mental health .     Inpatient Details (if applicable):   Is patient admitted voluntarily:Yes      Patient aware of potential for transfer if there is not appropriate placement? Yes       Patient is willing to travel outside of the [facility] for placement? No      Behavioral Intake Notified? Yes: Date: 1/16/23 Time: 11:55pm.        Assessment Details    Patient interview started at: 11:10pm and completed at: 12:00am.     Total duration spent on the patient case in minutes: 1.25 hrs      CPT code(s) utilized: 10734 - Psychotherapy for Crisis - 60 (30-74*) min       Mell Dawn, GUSTABO, MSW, LICSW, Psychotherapist  [facility] - Triage & Transition Services  Callback:  [phone]

## 2023-03-24 NOTE — PROGRESS NOTES
Hello,    Patient has an upcoming hysterectomy schedule on 4/20/23 with Dr. Jolley and needs a Colonoscopy done on 4/19/23. Patient wants Bety, I spoke to one of the  that assist with scheduling for Dr. Gomez and was told he is booked out until June. Is there any way we can fit this patient in?    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 Post-op course c/b hypercapneic and hypoxic respiratory failure and was reintubated though quickly extubated now doing well. Liver bx (10/6) sig for Zone 3 hemorrhage and collapse, in addition to cholestasis. Transferred from ICU on POD #4. ID consulted to comment on positive diphtheroid culture from ascitic fluid (likely skin colonization) as well as ESBL Klebsiella pneumoniae in urine culture (10/4).  Pt asymptomatic and cell count from ascitic fluid unremarkable. Per ID recs, no need to treat diphtheroids or asymptomatic ESBL Kleb pneumo bacteriuria though pt has had 6 days of therapy which would cover these organisms. Mild peritransplant ascites- repeat US 10/6 with increased arterial resistive indices. Repeat US 10/9 w/ continued elevation in RIs and fluid collections. LFTs trending down nicely.      Acute gradual worsening of renal function s/p OLTx.Creatinine on arrival 0.8 and has been up trending. Nephrology consulted for post-op EVA. Cr cont to trend up but remains to have good UOP. Had stable response to lasiz diuresis.  BUN elevated requiring multiple rounds of dialysis (10/15, 10/17, 10/20, 10/31). No significant improvements in kidney function noted.   In addition, pt H/H cont to fluctuate requiring multiple blood transfusions (10/14, 10/16, 10/18, 10/19, 10/22, 10/27, 10/29).  Pt reported dark stools 10/15 but color has normalized. FOBT+. EGD 10/18 consistent with ulcerated mucosa in duodenum s/p  coagulation. Concern for H. Pylori. Started Amoxicillin/levofloxacin (10/19, complete 11/2)). Remains on Protonix 40 mg BID. Biopsy negative for infection and CMV. Will cont close monitoring H/H, transfuse as needed with goal Hb > 7.0.  Of note, Urine cx + Klebsiella pneumoniae (10/13). Received 3 day course of ciprofloxacin, dc'd 10/19.   On 10/19 pm pt c/o crushing chest pain. Troponin 0.094, likely 2/2 ischemic stress. EKG NSR.      11/5/18 - no acute events overnight. Pt reports feeling well this am. One unit of PRBCs given 11/3 with good response. Cr level noted with only minimal increases. Lasix placed on hold per nephrology recs. Will plan to hydrate today w/ albumin. EGD 11/5 unimpressive for overt bleed. Pt remains on protonix 40 BID. Denies melena. Cont to hold heparin. WBC cont to decrease. Immunosuppression managed with hydrocortisone 50 (cont to hold Cellcept, bactrim, valcyte). Prograf now restarted and transitioned IV steroids to PO prednisone on 11/3. CMV PCR noted with 129. Cont to monitor.     Interval History:   No acute events overnight, sitting in chair today. UOP decreased to 600 ml UOP overnight fluid balance not accurate. Had hepatic angiogram yesterday with complet occlusion. 24 ml of contrast used. sCr remins stable 2.0 mg/dL.     Review of patient's allergies indicates:  No Known Allergies  Current Facility-Administered Medications   Medication Frequency    acetaminophen tablet 650 mg Q6H PRN    albumin human 25% bottle 25 g Q8H    aspirin chewable tablet 81 mg Daily    atovaquone suspension 1,500 mg Daily    bisacodyl EC tablet 10 mg QHS    bisacodyl suppository 10 mg Daily PRN    calcium carbonate 200 mg calcium (500 mg) chewable tablet 500 mg TID PRN    dextrose 50% injection 12.5 g PRN    dextrose 50% injection 25 g PRN    docusate sodium capsule 100 mg Daily    entecavir tablet 0.5 mg Q72H    furosemide tablet 80 mg BID    glucagon (human recombinant) injection 1 mg PRN     glucose chewable tablet 16 g PRN    glucose chewable tablet 24 g PRN    insulin aspart U-100 pen 0-5 Units QID (AC + HS) PRN    insulin aspart U-100 pen 5-10 Units TIDWM    magnesium oxide tablet 800 mg BID    midodrine tablet 15 mg TID    mycophenolate capsule 500 mg BID    ondansetron disintegrating tablet 8 mg Q8H PRN    ondansetron injection 4 mg Q8H PRN    pantoprazole EC tablet 40 mg BID    polyethylene glycol packet 17 g Daily PRN    polyethylene glycol packet 17 g Daily    predniSONE tablet 10 mg Daily    Followed by    [START ON 11/24/2018] predniSONE tablet 5 mg Daily    Followed by    [START ON 12/1/2018] predniSONE tablet 2.5 mg Daily    sodium chloride 0.9% flush 3 mL PRN    tacrolimus capsule 0.5 mg BID    traMADol tablet 50 mg Q6H PRN    ursodiol capsule 300 mg BID       Objective:     Vital Signs (Most Recent):  Temp: 98.2 °F (36.8 °C) (11/20/18 1605)  Pulse: 97 (11/20/18 1605)  Resp: 17 (11/20/18 1605)  BP: 111/71 (11/20/18 1605)  SpO2: 95 % (11/20/18 1605)  O2 Device (Oxygen Therapy): room air (11/20/18 1605) Vital Signs (24h Range):  Temp:  [96.4 °F (35.8 °C)-98.2 °F (36.8 °C)] 98.2 °F (36.8 °C)  Pulse:  [] 97  Resp:  [11-20] 17  SpO2:  [95 %-99 %] 95 %  BP: (103-124)/(57-77) 111/71     Weight: 63.2 kg (139 lb 5.3 oz) (11/19/18 0647)  Body mass index is 24.68 kg/m².  Body surface area is 1.68 meters squared.    I/O last 3 completed shifts:  In: 840 [P.O.:240; I.V.:600]  Out: 700 [Urine:700]    Physical Exam   Constitutional: She is oriented to person, place, and time. She appears well-developed. No distress.   Temporal and distal extremity muscle wasting   HENT:   Head: Normocephalic and atraumatic.   Eyes: EOM are normal. No scleral icterus.   Neck: Normal range of motion.   Cardiovascular: Normal rate and regular rhythm.   No murmur heard.  Pulmonary/Chest: Effort normal. No respiratory distress. She has no wheezes.   Abdominal: Bowel sounds are normal. She exhibits  distension (improved from yesterday). There is tenderness. There is no guarding.   Chevron incision w/ steri strips, minimal serous leakage from R side   RLQ VALORIE drain sites c/d/i with sutures   Musculoskeletal: Normal range of motion. She exhibits edema (2+ BLE, + generalized).   Neurological: She is alert and oriented to person, place, and time.   Skin: Skin is warm. She is not diaphoretic.   Psychiatric: She has a normal mood and affect.   Nursing note and vitals reviewed.    Interval History:   No acute events overnight. UOP 1050 ml UOP overnight fluid balance -450 ml yesterday. sCr very slowly improving. [facility]. She feels well.     Review of patient's allergies indicates:  No Known Allergies  Current Facility-Administered Medications   Medication Frequency    acetaminophen tablet 650 mg Q6H PRN    albumin human 25% bottle 25 g Q8H    aspirin chewable tablet 81 mg Daily    atovaquone suspension 1,500 mg Daily    bisacodyl EC tablet 10 mg QHS    bisacodyl suppository 10 mg Daily PRN    calcium carbonate 200 mg calcium (500 mg) chewable tablet 500 mg TID PRN    dextrose 50% injection 12.5 g PRN    dextrose 50% injection 25 g PRN    docusate sodium capsule 100 mg Daily    entecavir tablet 0.5 mg Q72H    furosemide tablet 80 mg BID    glucagon (human recombinant) injection 1 mg PRN    glucose chewable tablet 16 g PRN    glucose chewable tablet 24 g PRN    insulin aspart U-100 pen 0-5 Units QID (AC + HS) PRN    insulin aspart U-100 pen 5-10 Units TIDWM    magnesium oxide tablet 800 mg BID    midodrine tablet 15 mg TID    mycophenolate capsule 500 mg BID    ondansetron disintegrating tablet 8 mg Q8H PRN    ondansetron injection 4 mg Q8H PRN    pantoprazole EC tablet 40 mg BID    polyethylene glycol packet 17 g Daily PRN    polyethylene glycol packet 17 g Daily    predniSONE tablet 10 mg Daily    Followed by    [START ON 11/24/2018]  predniSONE tablet 5 mg Daily    Followed by    [START ON 12/1/2018] predniSONE tablet 2.5 mg Daily    sodium chloride 0.9% flush 3 mL PRN    tacrolimus capsule 0.5 mg BID    traMADol tablet 50 mg Q6H PRN    ursodiol capsule 300 mg BID       Objective:     Vital Signs (Most Recent):  Temp: 98.2 °F (36.8 °C) (11/20/18 1605)  Pulse: 97 (11/20/18 1605)  Resp: 17 (11/20/18 1605)  BP: 111/71 (11/20/18 1605)  SpO2: 95 % (11/20/18 1605)  O2 Device (Oxygen Therapy): room air (11/20/18 1605) Vital Signs (24h Range):  Temp:  [96.4 °F (35.8 °C)-98.2 °F (36.8 °C)] 98.2 °F (36.8 °C)  Pulse:  [] 97  Resp:  [11-20] 17  SpO2:  [95 %-99 %] 95 %  BP: (103-124)/(57-77) 111/71     Weight: 63.2 kg (139 lb 5.3 oz) (11/19/18 0647)  Body mass index is 24.68 kg/m².  Body surface area is 1.68 meters squared.    I/O last 3 completed shifts:  In: 840 [P.O.:240; I.V.:600]  Out: 700 [Urine:700]    Physical Exam   Constitutional: She is oriented to person, place, and time. She appears well-developed. No distress.   Temporal and distal extremity muscle wasting   HENT:   Head: Normocephalic and atraumatic.   Eyes: EOM are normal. No scleral icterus.   Neck: Normal range of motion.   Cardiovascular: Normal rate and regular rhythm.   No murmur heard.  Pulmonary/Chest: Effort normal. No respiratory distress. She has no wheezes.   Abdominal: Bowel sounds are normal. She exhibits distension (improved from yesterday). There is tenderness. There is no guarding.   Chevron incision w/ steri strips, minimal serous leakage from R side   RLQ VALORIE drain sites c/d/i with sutures   Musculoskeletal: Normal range of motion. She exhibits edema (2+ BLE, + generalized).   Neurological: She is alert and oriented to person, place, and time.   Skin: Skin is warm. She is not diaphoretic.   Psychiatric: She has a normal mood and affect.   Nursing note and vitals reviewed.      Significant Labs:  CBC:   Recent Labs   Lab 11/20/18  0814   WBC 6.81   RBC 2.67*   HGB  7.7*   HCT 24.6*      MCV 92   MCH 28.8   MCHC 31.3*     CMP:   Recent Labs   Lab 11/20/18  0814   *   CALCIUM 8.1*   ALBUMIN 2.5*   PROT 4.8*   *   K 3.1*   CO2 26   CL 90*   BUN 81*   CREATININE 2.0*   ALKPHOS 115   ALT 13   AST 17   BILITOT 1.2*     All labs within the past 24 hours have been reviewed.     Significant Imaging:  US Liver Transplant Impression       Abnormally decreased main hepatic artery peak systolic velocity with slow arterial flow and abnormal tardus parvus waveforms intrahepatically.  Findings concerning for developing hepatic artery stenosis/rejection.  Recommend continued close follow-up with consideration of possible contrast enhanced triple-phase abdominal CT for further evaluation if clinically indicated.    Peritransplant fluid collection as above.    Grossly stable ascites.    Right-sided pleural effusion.    This report was flagged in Epic as abnormal.    Electronically signed by resident: Ciro Love  Date: 11/15/2018  Time: 10:19    Electronically signed by: Yvan Watkins MD  Date: 11/15/2018  Time: 10:29         Significant Labs:  CBC:   Recent Labs   Lab 11/20/18  0814   WBC 6.81   RBC 2.67*   HGB 7.7*   HCT 24.6*      MCV 92   MCH 28.8   MCHC 31.3*     CMP:   Recent Labs   Lab 11/20/18  0814   *   CALCIUM 8.1*   ALBUMIN 2.5*   PROT 4.8*   *   K 3.1*   CO2 26   CL 90*   BUN 81*   CREATININE 2.0*   ALKPHOS 115   ALT 13   AST 17   BILITOT 1.2*     All labs within the past 24 hours have been reviewed.     Significant Imaging:  US Liver Transplant Impression       Abnormally decreased main hepatic artery peak systolic velocity with slow arterial flow and abnormal tardus parvus waveforms intrahepatically.  Findings concerning for developing hepatic artery stenosis/rejection.  Recommend continued close follow-up with consideration of possible contrast enhanced triple-phase abdominal CT for further evaluation if clinically  indicated.    Peritransplant fluid collection as above.    Grossly stable ascites.    Right-sided pleural effusion.    This report was flagged in Epic as abnormal.    Electronically signed by resident: Ciro Love  Date: 11/15/2018  Time: 10:19    Electronically signed by: Yvan Watkins MD  Date: 11/15/2018  Time: 10:29         Assessment/Plan:      Post LT - Acute renal failure    Post-LT EVA is multifactorial in origin and has been related to the severity of liver disease, toxicity of immunosuppressive therapy and hepatic ischaemia reperfusion injury might be a driving force in the aetiology of post-LT EVA.     Plan:  - No indication for RRT  - decrease  ml documented   - stable sCr 2.0 mg/dL  - Hepatic angiogram done 24 ml of hypo-osmotic contrast  - No uremic symptoms or signs.   - Patient's pitting LE edema slowly improving and evidence of fluid overload, but OS requirements as stable  -  Will cont to trend Renal fx and electrolytes   - Continue furosemide at 80 mg PO BID dose.  - Accutate I/O's and daily standing am weights           Thank you for your consult. I will follow-up with patient. Please contact us if you have any additional questions.    Dakota Garcia MD  Nephrology  [facility]

## 2023-04-29 NOTE — ASSESSMENT & PLAN NOTE
- PT/OT consulted.  Progressing well w/ therapy.  - Recommend home health PT and rolling walker at discharge (orders placed 11/6).     heroin, cocaine use night of 4/28/23. serum tox -, UTOX + cocaine, benzo (received Valium 5mg in ED). Methadone 10mg PO PRN up to TID for heroin withdrawal/cravings; no known hx of alcohol withdrawal

## 2023-06-30 NOTE — ASSESSMENT & PLAN NOTE
BG goal 140-180    rewrite transition drip at rate of 1.4 with Low SQ Correction scale.  POC glucose q4 hr given finger sticks - coordinate with meals during the day  Start novolog 4 units with meals.     ADDENDUM:  at lunch. Refused clear liquid tray. Patient ate outside food and hospital food without insulin administration. Will re-evaluate at dinner as BG was 98 this AM on labs and likely related to mealtime needs.         Discharge planning:  TBD     Normal rate, regular rhythm.  Heart sounds S1, S2.  No murmurs, rubs or gallops.

## 2023-08-04 NOTE — ASSESSMENT & PLAN NOTE
- Urine Cx 10/15 w/ Klebsiella pneumonia.  - Started Ciprofloxacin (10/17-10/19).   - Started on amoxicillin/levo for H. Pylori (completed abx 11/2).  - Pt c/o lower back pain 11/22. UA w/ trace leuks, moderate wbs, 1+ blood, cx pending  - Pt symptomatic (back pain) & with ESBL in wound previously.  Ertapenem 1g QD x 3 days (started 11/23), per ID recs.    - Given pt cont to have back pain and concern for infection in wound ( thick drainage and increased drainage)-  extended Ertapenem for 4 more days per ID (stop date 11/29/18).       - - -

## 2024-01-18 NOTE — ASSESSMENT & PLAN NOTE
- EGD 10/18- sig for ulcerated duodenal mucosa.   - Biopsy 10/18- no infection detected.  - Cont protonix 40 BID.  - Started H. Pylori tx 10/19: Amoxicillin/Levo. Treat x 14 days, end 11/2.     Mom calling that she tried calling different pharmacy but nobody has it. Also her insurance does not cover it. Is their an alternative to this medication?

## 2024-08-20 NOTE — ASSESSMENT & PLAN NOTE
- CRRT 10/14.  - SLED 10/17, 10/20, 10/31.       She picked up prescription yesterday. She was agreeable to starting amlodipine and verbalized understanding. She was instructed to call with further questions or concerns.

## 2024-09-09 NOTE — PROGRESS NOTES
Was able to complete pt. Profile assessment with the help of dominga; however, pt. Daughter said she was not a good  and would rather continue with her own  Dunia. GREGORIO Flannery aware pt. Is here and would like phone call when Dunia gets here to translate for family. Dunia, currently on her way to hospital.   25

## 2025-02-23 NOTE — ASSESSMENT & PLAN NOTE
- Fluctuating H/H.    - Transfuse 10/14; 10/16; 10/18; 10/19; 10/22; 10/27; 10/29  - Decreased platelets -- 1 u plt given 10/18.  - DDAVP given 10/18 and 10/19.  - FOBT +.  - LDH elevated, Hapto < 10.  - EGD 10/18: ulcerated duodenal mucosa.   - Monitoring w/ CBC q12hr to assess the need for transfusion.  - H/H stable     ppi

## 2025-07-07 NOTE — ASSESSMENT & PLAN NOTE
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   -POD 1 US: increased arterial resistive indices, suggestive of edema vs cute rejection vs congestion  -Repeat US 10/9 with continued elevation of RIs  -LFTs trending down  -T bili trending down  -repeat liver US (10/11): elevated RIs  - LFTs normalizing          Quality 130: Documentation Of Current Medications In The Medical Record: Current Medications Documented Detail Level: Detailed Quality 226: Preventive Care And Screening: Tobacco Use: Screening And Cessation Intervention: Patient screened for tobacco use and is an ex/non-smoker Quality 431: Preventive Care And Screening: Unhealthy Alcohol Use - Screening: Patient not identified as an unhealthy alcohol user when screened for unhealthy alcohol use using a systematic screening method Quality 47: Advance Care Plan: Advance Care Planning discussed and documented; advance care plan or surrogate decision maker documented in the medical record.

## (undated) DEVICE — SYS LABEL CORRECT MED

## (undated) DEVICE — SUT SILK 3-0 SH DETACH 30IN

## (undated) DEVICE — SUT PROLENE 5-0 36IN C-1

## (undated) DEVICE — Device

## (undated) DEVICE — SEE MEDLINE ITEM 156901

## (undated) DEVICE — ELECTRODE PAD DEFIB STERILE

## (undated) DEVICE — SET DECANTER MEDICHOICE

## (undated) DEVICE — SUT ETHILON 3-0 PS2 18 BLK

## (undated) DEVICE — LINE STERILE PRESSURE 48 INCH

## (undated) DEVICE — BOOT AIR FLUID HEEL ADLT STD

## (undated) DEVICE — KIT INTRO MICRO NIT VSI 4FR

## (undated) DEVICE — PAD K-THERMIA 24IN X 60IN

## (undated) DEVICE — SHEATH FLEXOR ANSEL 4FR 70X35

## (undated) DEVICE — SUT 4-0 12-18IN SILK BLACK

## (undated) DEVICE — SPONGE LAP 18X18 PREWASHED

## (undated) DEVICE — SET EXTENSION STERILE 30IN

## (undated) DEVICE — SUT SILK 2-0 STRANDS 30IN

## (undated) DEVICE — SPONGE DERMACEA 4X4IN 12PLY

## (undated) DEVICE — SEE MEDLINE ITEM 152512

## (undated) DEVICE — TEGADERM IV

## (undated) DEVICE — SEE MEDLINE ITEM 146420

## (undated) DEVICE — BOOT SUTURE AID

## (undated) DEVICE — NDL MONOPTY BIOPSY 14GX10CM

## (undated) DEVICE — SYR MED RAD 150ML

## (undated) DEVICE — GOWN SURGICAL X-LARGE

## (undated) DEVICE — DRAPE PLASTIC U 60X72

## (undated) DEVICE — TUBE FEEDING PURPLE 8FRX40CM

## (undated) DEVICE — KIT INTRODUCER W/GUIDEWIRE

## (undated) DEVICE — SUT PROLENE 3-0 SH DA 36 BL

## (undated) DEVICE — DRAIN CHANNEL ROUND 19FR

## (undated) DEVICE — CONTAINER SPECIMEN STRL 4OZ

## (undated) DEVICE — SOL NS 1000CC

## (undated) DEVICE — SUT PROLENE 6-0 BV-1

## (undated) DEVICE — SUT PDS BV 6-0

## (undated) DEVICE — DRESSING AQUACEL SACRAL 9 X 9

## (undated) DEVICE — CLIP SPRING 6MM

## (undated) DEVICE — SUT SILK 3-0 SH 18IN BLACK

## (undated) DEVICE — SUT PROLENE 4-0 SH BLU 36IN

## (undated) DEVICE — COVER INSTR ELASTIC BAND 40X20

## (undated) DEVICE — KIT COLLECTION E SWAB REGULAR

## (undated) DEVICE — APPLICATOR CHLORAPREP ORN 26ML

## (undated) DEVICE — SUT 4-0 12-30IN SILK

## (undated) DEVICE — CATH URETHRAL RED RUBBER 18FR

## (undated) DEVICE — SYR SLIP TIP 1CC

## (undated) DEVICE — SEE MEDLINE ITEM 152622

## (undated) DEVICE — TOWEL OR XRAY WHITE 17X26IN

## (undated) DEVICE — SUT 3-0 12-18IN SILK

## (undated) DEVICE — SUT SILK 0 STRANDS 30IN BLK

## (undated) DEVICE — SEE MEDLINE ITEM 146417

## (undated) DEVICE — SET IV ADMIN 15DROP 3 CARESITE

## (undated) DEVICE — STAPLER SKIN PROXIMATE WIDE

## (undated) DEVICE — SUT 1 36IN PDS II VIO MONO

## (undated) DEVICE — SUT CTD VICRYL VIL BR SH 27

## (undated) DEVICE — COVER CLAMP FABRIC RADIOPAQUE

## (undated) DEVICE — SUT PROLENE 6-0 BV-1 30IN

## (undated) DEVICE — EVACUATOR WOUND BULB 100CC

## (undated) DEVICE — COVER LIGHT HANDLE 80/CA

## (undated) DEVICE — GUIDEWIRE LAUREATE 035IN 260CM

## (undated) DEVICE — WIPE ESENTA BARR STNG FREE 3ML

## (undated) DEVICE — KIT SAHARA DRAPE DRAW/LIFT

## (undated) DEVICE — HANDSET ARGON PLUS

## (undated) DEVICE — PACK UNIVERSAL SPLIT II

## (undated) DEVICE — ELECTRODE REM PLYHSV RETURN 9

## (undated) DEVICE — SYR ONLY LUER LOCK 20CC

## (undated) DEVICE — SEE MEDLINE ITEM 156902

## (undated) DEVICE — DRESSING ADH ISLAND 3.6 X 14

## (undated) DEVICE — SUTURE PROLENE 5-0

## (undated) DEVICE — WIRE PTCA .014X300 CHOICE PTXS

## (undated) DEVICE — SEE MEDLINE ITEM 146372

## (undated) DEVICE — DRAPE BAG ISOLATION 20 X 20

## (undated) DEVICE — CATH SUPERCROSS 45 DEG 130CM

## (undated) DEVICE — HEMOSTAT SURGICEL NU-KNIT 6X9

## (undated) DEVICE — SUT SILK 3-0 STRANDS 30IN

## (undated) DEVICE — STAPLER SKIN ROTATING HEAD

## (undated) DEVICE — SUT 2-0 12-18IN SILK

## (undated) DEVICE — TRAY FOLEY 16FR INFECTION CONT